# Patient Record
Sex: MALE | Race: WHITE | Employment: OTHER | URBAN - METROPOLITAN AREA
[De-identification: names, ages, dates, MRNs, and addresses within clinical notes are randomized per-mention and may not be internally consistent; named-entity substitution may affect disease eponyms.]

---

## 2017-01-03 ENCOUNTER — HOSPITAL ENCOUNTER (OUTPATIENT)
Dept: PHYSICAL THERAPY | Age: 62
Discharge: HOME | End: 2017-01-03
Payer: COMMERCIAL

## 2017-01-03 PROCEDURE — 97110 THERAPEUTIC EXERCISES: CPT

## 2017-01-03 PROCEDURE — 97140 MANUAL THERAPY 1/> REGIONS: CPT

## 2017-01-03 NOTE — PROGRESS NOTES
PT DAILY TREATMENT NOTE     Patient Name: Mike Winter  Date:1/3/2017  : 1955  [x]  Patient  Verified  Payor: BLUE CROSS / Plan: Kiki Aguilar / Product Type: PPO /    In time: 5:30  Out time:6:30  Total Treatment Time (min): 60  Visit #: 6 of 12    Treatment Area: Other cervical disc degeneration, unspecified cervical region [M50.30]  Other specific arthropathies, not elsewhere classified, other specified site [M12.88]  Other muscle spasm [M62.838]  Other headache syndrome [G44.89]    SUBJECTIVE  Pain Level (0-10 scale): 4/10 neck; 4/10 HA  Any medication changes, allergies to medications, adverse drug reactions, diagnosis change, or new procedure performed?: [x] No    [] Yes (see summary sheet for update)  Subjective functional status/changes:   [] No changes reported  Pt reports still fluctuating headaches but definitely when he wakes up in the morning that can go around the top of his head to his eye. Pt states he is working on his posture more while driving but hasn't done the pillow behind his back yet. Pt feels he is improving since starting therapy as he doesn't have to take as much medication any more. Pt has a follow up with MD at the end of this month to go over MRI results but he hasn't heard from the MD yet.      OBJECTIVE    Modality rationale: decrease pain and increase tissue extensibility to improve the patients ability to improve ease of sleeping and performing ADLs   Min Type Additional Details    [] Estim:  []Unatt       []IFC  []Premod                        []Other:  []w/ice   []w/heat  Position:  Location:    [] Estim: []Att    []TENS instruct  []NMES                    []Other:  []w/US   []w/ice   []w/heat  Position:  Location:    []  Traction: [] Cervical       []Lumbar                       [] Prone          []Supine                       []Intermittent   []Continuous Lbs:  [] before manual  [] after manual    []  Ultrasound: []Continuous   [] Pulsed []1MHz   []3MHz W/cm2:  Location:    []  Iontophoresis with dexamethasone         Location: [] Take home patch   [] In clinic   10 []  Ice     [x]  heat  []  Ice massage  []  Laser   []  Anodyne Position: setaed  Location: c/s    []  Laser with stim  []  Other:  Position:  Location:    []  Vasopneumatic Device Pressure:       [] lo [] med [] hi   Temperature: [] lo [] med [] hi   [x] Skin assessment post-treatment:  [x]intact []redness- no adverse reaction    []redness - adverse reaction:     35 min Therapeutic Exercise:  [x] See flow sheet :   Rationale: increase ROM, increase strength and improve coordination to improve the patients ability to improve posture and decrease headache symptoms    15 min Manual Therapy:  SOR   Rationale: decrease pain, increase ROM and increase tissue extensibility to improve ease of driving and reducing headaches          With   [] TE   [] TA   [] neuro   [] other: Patient Education: [x] Review HEP    [] Progressed/Changed HEP based on:   [] positioning   [] body mechanics   [] transfers   [] heat/ice application    [] other:      Other Objective/Functional Measures: FOTO: 57  Decreased headache with supine lying and SOR  No increased pain with exercises  Continues to have forward head posture and requires cueing to correct     Pain Level (0-10 scale) post treatment: 1-2/10    ASSESSMENT/Changes in Function: Pt making slow and steady progress towards goals. While pt isn't requiring as much medication he continues to have headaches daily that range from moderate to severe along suboccipital distribution. Pt has forward head posture and is making some modifications at home but not all. Will continue working on improved posture awareness to decrease headaches and improve c/s mobility and stability for ease of driving and performing ADLs.      Patient will continue to benefit from skilled PT services to modify and progress therapeutic interventions, address functional mobility deficits, address ROM deficits, address strength deficits and assess and modify postural abnormalities to attain remaining goals. Progress towards goals / Updated goals:  Short Term Goals: To be accomplished in 1 weeks:  1. Pt will be independent with HEP with appropriate technique and compliance of 1-2x per day to facilitate continued level of care. met   Long Term Goals: To be accomplished in 6 weeks:  1. Pt will improve FOTO score by 6 points to demonstrate improved functional abilities. Progressed 1 point (1/3/17)  2. Pt will report <4/10 pain during daily activities to improve QOL. Not met continues to fluctuate in intensity (1/3/17)  3. Pt will report 2 headaches per week for 2 weeks to demonstrate improved symptoms. Not met still daily (1/3/17)  4. Pt will improve cervical ROM to 75% in each direction to increase ease with driving. 5. Pt will demonstrate proper posturing during standing and sitting activities in thoracic and cervical spine to increase ease with work and [employer] activities.  Continues to require cues to prevent forward head posture (1/3/17)    PLAN  [x]  Upgrade activities as tolerated     [x]  Continue plan of care  []  Update interventions per flow sheet       []  Discharge due to:_  []  Other:_      Hyacinth Mcburney, PTA 1/3/2017  10:01 AM    Future Appointments  Date Time Provider Dex [place]   1/3/2017 5:30 PM Hyacinth Mcburney, PTA [facility]   1/5/2017 5:30 PM Hyacinth Mcburney, PTA [facility]   1/24/2017 3:15 PM Rajinder Cole  [address]   3/23/2017 4:15 PM Lyly Hall  [address] [place]

## 2017-01-05 ENCOUNTER — HOSPITAL ENCOUNTER (OUTPATIENT)
Dept: PHYSICAL THERAPY | Age: 62
Discharge: HOME | End: 2017-01-05
Payer: COMMERCIAL

## 2017-01-05 PROCEDURE — 97140 MANUAL THERAPY 1/> REGIONS: CPT

## 2017-01-05 PROCEDURE — 97110 THERAPEUTIC EXERCISES: CPT

## 2017-01-05 NOTE — PROGRESS NOTES
PT DAILY TREATMENT NOTE     Patient Name: Kerry Galicia  Date:2017  : 1955  [x]  Patient  Verified  Payor: BLUE CROSS / Plan: [address] [place] [address] [place] / Product Type: PPO /    In time:532  Out time:615  Total Treatment Time (min): 43  Visit #: 7 of 12    Treatment Area: Other cervical disc degeneration, unspecified cervical region [M50.30]  Other specific arthropathies, not elsewhere classified, other specified site [M12.88]  Other muscle spasm [M62.838]  Other headache syndrome [G44.89]    SUBJECTIVE  Pain Level (0-10 scale): 3/10 neck and HA symptoms  Any medication changes, allergies to medications, adverse drug reactions, diagnosis change, or new procedure performed?: [x] No    [] Yes (see summary sheet for update)  Subjective functional status/changes:   [] No changes reported  Pt reports having L shoulder and arm pain after last session which he think started from the  ER exercise. Pt also went to store, mother's house, and other house activities that day, but the pain subsided once he went to bed. Pt did not have any pain yesterday.      OBJECTIVE    Modality rationale: decrease pain and increase tissue extensibility to improve the patients ability to increase ease with ADLs and household tasks    Min Type Additional Details    [] Estim:  []Unatt       []IFC  []Premod                        []Other:  []w/ice   []w/heat  Position:  Location:    [] Estim: []Att    []TENS instruct  []NMES                    []Other:  []w/US   []w/ice   []w/heat  Position:  Location:    []  Traction: [] Cervical       []Lumbar                       [] Prone          []Supine                       []Intermittent   []Continuous Lbs:  [] before manual  [] after manual    []  Ultrasound: []Continuous   [] Pulsed                           []1MHz   []3MHz W/cm2:  Location:    []  Iontophoresis with dexamethasone         Location: [] Take home patch   [] In clinic   10 []  Ice     [x]  heat  []  Ice massage  [] Laser   []  Anodyne Position: Seated  Location: C/S    []  Laser with stim  []  Other:  Position:  Location:    []  Vasopneumatic Device Pressure:       [] lo [] med [] hi   Temperature: [] lo [] med [] hi   [] Skin assessment post-treatment:  []intact []redness- no adverse reaction    []redness - adverse reaction:     24 min Therapeutic Exercise:  [x] See flow sheet :   Rationale: increase ROM, increase strength and improve coordination to improve the patients ability to perform functional activities in the home and community setting     9 min Manual Therapy:  Suboccipital release, UT trigger point release, L cervical facet side glides grade 3   Rationale: decrease pain, increase ROM, increase tissue extensibility and decrease trigger points to increase ease with daily tasks and reduce HA symptoms           With   [x] TE   [] TA   [] neuro   [] other: Patient Education: [x] Review HEP    [] Progressed/Changed HEP based on:   [] positioning   [] body mechanics   [] transfers   [] heat/ice application    [] other:      Other Objective/Functional Measures:   Tactile cueing during chin retraction as more cervical extension was occurring than retraction    Slight increase in tightness along L cervical region following manual therapy but HA symptoms reduced  Stretches incorporated into HEP not in clinic any more     Pain Level (0-10 scale) post treatment: 1/10 (no HA, just slight slight pain in neck)    ASSESSMENT/Changes in Function: Pt tolerated treatment session fairly today with good form during all stretches. Will incorporate stretches (UT, LS, and pec stretching) into HEP to work more on mobility and strengthening. Pt continued to require tactile cueing for cervical retraction as more extension was present. Pt is able to sustain correct position following correction. Manual therapy and heat is able to reduce HA symptoms and reduce cervical pain but continues to have increase intermittently between sessions. Encouraged pt to get tennis balls to help with self relief of HA. Patient will continue to benefit from skilled PT services to modify and progress therapeutic interventions, address functional mobility deficits, address ROM deficits, address strength deficits, analyze and address soft tissue restrictions, analyze and cue movement patterns, analyze and modify body mechanics/ergonomics, assess and modify postural abnormalities and instruct in home and community integration to attain remaining goals. [x]  See Plan of Care  []  See progress note/recertification  []  See Discharge Summary         Progress towards goals / Updated goals:  Short Term Goals: To be accomplished in 1 weeks:  1. Pt will be independent with HEP with appropriate technique and compliance of 1-2x per day to facilitate continued level of care. met   Long Term Goals: To be accomplished in 6 weeks:  1. Pt will improve FOTO score by 6 points to demonstrate improved functional abilities. Progressed 1 point (1/3/17)  2. Pt will report <4/10 pain during daily activities to improve QOL. Not met continues to fluctuate in intensity (1/3/17)  3. Pt will report 2 headaches per week for 2 weeks to demonstrate improved symptoms. Not met still daily (1/3/17)  4. Pt will improve cervical ROM to 75% in each direction to increase ease with driving. 5. Pt will demonstrate proper posturing during standing and sitting activities in thoracic and cervical spine to increase ease with work and [employer] activities.  Continues to require cues to prevent forward head posture (1/3/17)    PLAN  []  Upgrade activities as tolerated     [x]  Continue plan of care  []  Update interventions per flow sheet       []  Discharge due to:_  []  Other:_      Desmond Paul 1/5/2017  12:52 PM    Future Appointments  Date Time Provider Dex [place]   1/5/2017 5:30 PM Desmond Paul [facility]   1/24/2017 3:15 PM Mikki Liang  [address]   3/23/2017 4:15 PM Jose Armando Darden  [address] [place]

## 2017-01-12 ENCOUNTER — HOSPITAL ENCOUNTER (OUTPATIENT)
Dept: PHYSICAL THERAPY | Age: 62
Discharge: HOME | End: 2017-01-12
Payer: COMMERCIAL

## 2017-01-12 ENCOUNTER — TELEPHONE (OUTPATIENT)
Dept: ORTHOPEDIC SURGERY | Age: 62
End: 2017-01-12

## 2017-01-12 DIAGNOSIS — M50.30 DDD (DEGENERATIVE DISC DISEASE), CERVICAL: Primary | ICD-10-CM

## 2017-01-12 PROCEDURE — 97110 THERAPEUTIC EXERCISES: CPT

## 2017-01-12 PROCEDURE — 97140 MANUAL THERAPY 1/> REGIONS: CPT

## 2017-01-12 RX ORDER — LISINOPRIL AND HYDROCHLOROTHIAZIDE 12.5; 2 MG/1; MG/1
TABLET ORAL
Qty: 30 TAB | Refills: 0 | Status: SHIPPED | OUTPATIENT
Start: 2017-01-12 | End: 2017-02-13 | Stop reason: SDUPTHER

## 2017-01-12 NOTE — PROGRESS NOTES
PT DAILY TREATMENT NOTE     Patient Name: Yonatan Lorenzo  Date:2017  : 1955  [x]  Patient  Verified  Payor: BLUE CROSS / Plan: [email or web address] [facility] / Product Type: PPO /    In time:530  Out time:623  Total Treatment Time (min): 53  Visit #: 8 of 12    Treatment Area: Other cervical disc degeneration, unspecified cervical region [M50.30]  Other specific arthropathies, not elsewhere classified, other specified site [M12.88]  Other muscle spasm [M62.838]  Other headache syndrome [G44.89]    SUBJECTIVE  Pain Level (0-10 scale): 3/10 neck, 2/10 headache  Any medication changes, allergies to medications, adverse drug reactions, diagnosis change, or new procedure performed?: [x] No    [] Yes (see summary sheet for update)  Subjective functional status/changes:   [] No changes reported  Pt reports having less pain today. Pt had worse headache earlier but did chin tucks and pain reduced. Pt sometimes does chin tucks wrong and makes his headache worse.      OBJECTIVE    Modality rationale: decrease pain and increase tissue extensibility to improve the patients ability to increase ease with ADLs and household tasks    Min Type Additional Details    [] Estim:  []Unatt       []IFC  []Premod                        []Other:  []w/ice   []w/heat  Position:  Location:    [] Estim: []Att    []TENS instruct  []NMES                    []Other:  []w/US   []w/ice   []w/heat  Position:  Location:    []  Traction: [] Cervical       []Lumbar                       [] Prone          []Supine                       []Intermittent   []Continuous Lbs:  [] before manual  [] after manual    []  Ultrasound: []Continuous   [] Pulsed                           []1MHz   []3MHz W/cm2:  Location:    []  Iontophoresis with dexamethasone         Location: [] Take home patch   [] In clinic   10 []  Ice     [x]  heat  []  Ice massage  []  Laser   []  Anodyne Position: Seated  Location: C/S    []  Laser with stim  []  Other: Position:  Location:    []  Vasopneumatic Device Pressure:       [] lo [] med [] hi   Temperature: [] lo [] med [] hi   [x] Skin assessment post-treatment:  [x]intact []redness- no adverse reaction    []redness - adverse reaction:     35 min Therapeutic Exercise:  [x] See flow sheet :   Rationale: increase ROM, increase strength and improve coordination to improve the patients ability to perform functional activities with improve posture    8 min Manual Therapy:  Subocciptial release L rotation PNF contract relax to same side   Rationale: decrease pain, increase ROM and increase tissue extensibility to increase ease with ADLs and reduced headache symptoms           With   [x] TE   [] TA   [] neuro   [] other: Patient Education: [x] Review HEP    [] Progressed/Changed HEP based on:   [] positioning   [] body mechanics   [] transfers   [] heat/ice application    [] other:      Other Objective/Functional Measures:   Verbal and tactile cues for chin tucks and goes into cervical extension and attempts to retract  Able to perform following cueing for chin tucks   Increased to BTB during rows and extension  Improvements in PROM to L rotation but did not carry over to seated position  No headache following manual therapy      Pain Level (0-10 scale) post treatment: 0/10    ASSESSMENT/Changes in Function: See progress note    Patient will continue to benefit from skilled PT services to modify and progress therapeutic interventions, address functional mobility deficits, address ROM deficits, address strength deficits, analyze and address soft tissue restrictions, analyze and cue movement patterns, analyze and modify body mechanics/ergonomics, assess and modify postural abnormalities and instruct in home and community integration to attain remaining goals.      []  See Plan of Care  [x]  See progress note/recertification  []  See Discharge Summary         Progress towards goals / Updated goals:  See progress note     PLAN  [] Upgrade activities as tolerated     [x]  Continue plan of care  []  Update interventions per flow sheet       []  Discharge due to:_  []  Other:_      Chintan Mccormick 1/12/2017  2:38 PM    Future Appointments  Date Time Provider Dex Hat[place]   1/12/2017 5:30 PM Chintan Mccormick [facility]   1/17/2017 5:30 PM Chintan Mccormick [facility]   1/19/2017 5:30 PM Raeann Membreno, PT [facility]   1/24/2017 3:15 PM Prisca Sherwood  [address]   1/26/2017 5:30 PM Brandi Brian PTA [facility]   3/23/2017 4:15 PM Joyce Lim  [address] [place]

## 2017-01-12 NOTE — PROGRESS NOTES
[facility] - [address]  [phone] fax    Physical Therapy Progress Note  Patient name: Brittni Sullivan Start of Care: 16   Referral source: Merrill Bowens MD : 1955   Medical/Treatment Diagnosis: Other cervical disc degeneration, unspecified cervical region [M50.30]  Other specific arthropathies, not elsewhere classified, other specified site [M12.88]  Other muscle spasm [M62.838]  Other headache syndrome [G44.89] Onset Date:2 years ago     Prior Hospitalization: see medical history Provider#: [id number]   Medications: Verified on Patient Summary List    Comorbidities: Arthritis, HTN  Prior Level of Function:Ind with ADLs and work related tasks with less symptoms, 2-5 headaches per week, Going to [employer]  Visits from [facility] Care: 8    Missed Visits: 0    Established Goals:         Excellent           Good         Limited           None  [x] Increased ROM   []  []  [x]  []  [x] Increased Strength  []  []  [x]  []  [x] Increased Mobility  []  []  [x]  []   [x] Decreased Pain   []  []  [x]  []  [x] Decreased Heachaches  []  []  [x]  []    Key Functional Changes:   Short Term Goals: To be accomplished in 1 weeks:  1. Pt will be independent with HEP with appropriate technique and compliance of 1-2x per day to facilitate continued level of care. Met   Long Term Goals: To be accomplished in 6 weeks:  1. Pt will improve FOTO score by 6 points to demonstrate improved functional abilities. Progressed 1 point (1/3/17)  2. Pt will report <4/10 pain during daily activities to improve QOL. Not met continues to fluctuate in intensity from 2-7/10 pain depending on the day  (17)  3. Pt will report 2 headaches per week for 2 weeks to demonstrate improved symptoms. Not met still daily (17)  4. Pt will improve cervical ROM to 75% in each direction to increase ease with driving.  Progressing 17 46 degrees to L rotation (painful at [facility]), 55 degrees R rotation  5. Pt will demonstrate proper posturing during standing and sitting activities in thoracic and cervical spine to increase ease with work and [employer] activities. Continues to require cues to prevent forward head posture and rounded shoulders when standing and sitting (1/12/17)    Updated Goals: to be achieved in 4 weeks:  1. Pt will improve FOTO score by 6 points to demonstrate improved functional abilities. 2. Pt will report <4/10 pain during daily activities to improve QOL. 3. Pt will report 2 headaches per week for 2 weeks to demonstrate improved symptoms. 4. Pt will improve cervical ROM to 75% in each direction to increase ease with driving. 5. Pt will demonstrate proper posturing during standing and sitting activities in thoracic and cervical spine to increase ease with work and [employer] activities. ASSESSMENT/RECOMMENDATIONS:  Pt is making slow and steady improvements with physical therapy since evaluation. Pt continues to have headaches daily varying in intensity and severity. Pt has been educated on proper position every visit and has reported altering his truck seat to help with his pain. However, pt consistently catches himself with forward head posturing and rounded shoulders. Pt benefits from manual therapy to suboccipital muscles and mobilizations to cervical facets, but the duration of reduced symptoms is minimal. Pt has been educated on incorporating stretches into HEP and getting tennis balls to independently reduce headache symptoms but has yet to acquire. Pt will continue to benefit from skilled physical therapy services to address remaining deficits and independent management of headache symptoms to return pt to optimal level of function.      [x]Continue therapy per initial plan/protocol at a frequency of  2 x per week for 4 weeks  []Continue therapy with the following recommended changes:_____________________ _____________________________________________________________________  []Discontinue therapy progressing towards or have reached established goals  []Discontinue therapy due to lack of appreciable progress towards goals  []Discontinue therapy due to lack of attendance or compliance  []Await Physician's recommendations/decisions regarding therapy  []Other:________________________________________________________________    Thank you for this referral.   Kevin Benson 1/12/2017 2:40 PM    NOTE TO PHYSICIAN:  PLEASE COMPLETE THE ORDERS BELOW AND   FAX TO [facility]: [phone]  If you are unable to process this request in 24 hours please contact our office: [phone]    ? I have read the above report and request that my patient continue as recommended. ? I have read the above report and request that my patient continue therapy with the following changes/special instructions:____________________________________  ? I have read the above report and request that my patient be discharged from therapy.     Physicians signature: ______________________________Date: ______Time:______

## 2017-01-12 NOTE — TELEPHONE ENCOUNTER
Ok to place order per Dr. Brad Moreland, order faxed to [address] [facility]. Attempted to contact number listed below, no answer unable to leave message. No further action required at this time.

## 2017-01-12 NOTE — TELEPHONE ENCOUNTER
Patient has appt today at [facility]. Need a new order for addl therapy faxed to therapist.  Please fax asap. Patient wife can be reached at [phone].

## 2017-01-17 ENCOUNTER — HOSPITAL ENCOUNTER (OUTPATIENT)
Dept: PHYSICAL THERAPY | Age: 62
Discharge: HOME | End: 2017-01-17
Payer: COMMERCIAL

## 2017-01-17 PROCEDURE — 97110 THERAPEUTIC EXERCISES: CPT

## 2017-01-17 PROCEDURE — 97140 MANUAL THERAPY 1/> REGIONS: CPT

## 2017-01-17 NOTE — PROGRESS NOTES
PT DAILY TREATMENT NOTE     Patient Name: Buck August  Date:2017  : 1955  [x]  Patient  Verified  Payor: BLUE CROSS / Plan: [facility] / Product Type: PPO /    In time:529  Out time:614  Total Treatment Time (min): 45  Visit #: 1 of 8    Treatment Area: Other cervical disc degeneration, unspecified cervical region [M50.30]  Other specific arthropathies, not elsewhere classified, other specified site [M12.88]  Other muscle spasm [M62.838]  Other headache syndrome [G44.89]    SUBJECTIVE  Pain Level (0-10 scale): 3/10 neck and headache  Any medication changes, allergies to medications, adverse drug reactions, diagnosis change, or new procedure performed?: [x] No    [] Yes (see summary sheet for update)  Subjective functional status/changes:   [] No changes reported  Pt reports today's headache is better and , but yesterday's headache was bad. Pt still gets headaches daily. Pt continues to report neck pain precedes headache.      OBJECTIVE    Modality rationale: decrease pain and increase tissue extensibility to improve the patients ability to increase ease with headache symptoms and ADLs   Min Type Additional Details    [] Estim:  []Unatt       []IFC  []Premod                        []Other:  []w/ice   []w/heat  Position:  Location:    [] Estim: []Att    []TENS instruct  []NMES                    []Other:  []w/US   []w/ice   []w/heat  Position:  Location:    []  Traction: [] Cervical       []Lumbar                       [] Prone          []Supine                       []Intermittent   []Continuous Lbs:  [] before manual  [] after manual    []  Ultrasound: []Continuous   [] Pulsed                           []1MHz   []3MHz W/cm2:  Location:    []  Iontophoresis with dexamethasone         Location: [] Take home patch   [] In clinic   10 []  Ice     [x]  heat  []  Ice massage  []  Laser   []  Anodyne Position: Seated  Location: C/S    []  Laser with stim  []  Other: Position:  Location:    []  Vasopneumatic Device Pressure:       [] lo [] med [] hi   Temperature: [] lo [] med [] hi   [x] Skin assessment post-treatment:  [x]intact []redness- no adverse reaction    []redness - adverse reaction:     25 min Therapeutic Exercise:  [x] See flow sheet :   Rationale: increase ROM, increase strength and improve coordination to improve the patients ability to perform functional activities with decreased symptoms and improve posture    10 min Manual Therapy:  PA central cervical and thoracic, Lateral PA to cervical facets, Trigger point release to B UT, Subocciptial release    Rationale: decrease pain, increase ROM, increase tissue extensibility and decrease trigger points to increase ease with ADLs and driving          With   [x] TE   [] TA   [] neuro   [] other: Patient Education: [x] Review HEP    [] Progressed/Changed HEP based on:   [] positioning   [] body mechanics   [] transfers   [] heat/ice application    [] other:      Other Objective/Functional Measures:   Pain with thoracic extension over 1/2 foam roll supine - increased pain, Resumed seated position   Pain with thoracic PA around T3 but tolerable  Improvements in L rotation following lateral facet PA in prone  Received blue TB for exercises with HEP  No headache symptoms following manual  Pt demonstrated tennis ball release for home to reduce headache symptoms  Verbal cueing for straight arm pull down to reduce UT activation      Pain Level (0-10 scale) post treatment: 2/10 (behind ears on B sides of cervical spine)    ASSESSMENT/Changes in Function: Pt tolerated treatment session fairly well today with reduction of headache symptoms with subocciptal release. Pt demonstrated good form with tennis ball release for HEP. Pt is compliant with HEP and stretches. Pt had increased pain with thoracic PA mobilizations and is hypomobile throughout (and painful).  Due to hypomobility, pt's cervical spine is in increased lordosis and putting extra stress on subocciptial region. Pt is improving with UT awareness during exercises to reduce pain levels. Continue POC. Patient will continue to benefit from skilled PT services to modify and progress therapeutic interventions, address functional mobility deficits, address ROM deficits, address strength deficits, analyze and address soft tissue restrictions, analyze and cue movement patterns, assess and modify postural abnormalities and instruct in home and community integration to attain remaining goals. []  See Plan of Care  [x]  See progress note/recertification  []  See Discharge Summary         Progress towards goals / Updated goals:  1. Pt will improve FOTO score by 6 points to demonstrate improved functional abilities. 2. Pt will report <4/10 pain during daily activities to improve QOL. 3. Pt will report 2 headaches per week for 2 weeks to demonstrate improved symptoms. 4. Pt will improve cervical ROM to 75% in each direction to increase ease with driving. 5. Pt will demonstrate proper posturing during standing and sitting activities in thoracic and cervical spine to increase ease with work and [employer] activities.      PLAN  []  Upgrade activities as tolerated     [x]  Continue plan of care  []  Update interventions per flow sheet       []  Discharge due to:_  []  Other:_      Lizy Vizcaino 1/17/2017  10:01 AM    Future Appointments  Date Time Provider Dex Alysia   1/17/2017 5:30 PM Lizy Vizcaino [facility]   1/19/2017 5:30 PM Rosalva Villalobos PTA [facility]   1/24/2017 3:15 PM Valentina Dickerson  [address]   1/26/2017 5:30 PM Rosalva Villalobos PTA [facility]   3/23/2017 4:15 PM Mariella Mcdonough  [address] [place]

## 2017-01-18 ENCOUNTER — LAB ONLY (OUTPATIENT)
Dept: INTERNAL MEDICINE CLINIC | Age: 62
End: 2017-01-18

## 2017-01-18 ENCOUNTER — HOSPITAL ENCOUNTER (OUTPATIENT)
Dept: LAB | Age: 62
Discharge: HOME | End: 2017-01-18
Payer: COMMERCIAL

## 2017-01-18 DIAGNOSIS — R30.0 DYSURIA: ICD-10-CM

## 2017-01-18 DIAGNOSIS — Z12.5 SCREENING FOR PROSTATE CANCER: ICD-10-CM

## 2017-01-18 DIAGNOSIS — Z86.718 PERSONAL HISTORY OF VENOUS THROMBOSIS AND EMBOLISM: Primary | ICD-10-CM

## 2017-01-18 LAB
APPEARANCE UR: CLEAR
BACTERIA URNS QL MICRO: NEGATIVE /HPF
BILIRUB UR QL: NEGATIVE
COLOR UR: YELLOW
EPITH CASTS URNS QL MICRO: NEGATIVE /LPF (ref 0–5)
GLUCOSE UR STRIP.AUTO-MCNC: NEGATIVE MG/DL
HGB UR QL STRIP: NEGATIVE
INR BLD: 2
KETONES UR QL STRIP.AUTO: NEGATIVE MG/DL
LEUKOCYTE ESTERASE UR QL STRIP.AUTO: NEGATIVE
NITRITE UR QL STRIP.AUTO: NEGATIVE
PH UR STRIP: 7.5 [PH] (ref 5–8)
PROT UR STRIP-MCNC: NEGATIVE MG/DL
PSA SERPL-MCNC: 3 NG/ML (ref 0–4)
PT POC: NORMAL SEC
RBC #/AREA URNS HPF: NEGATIVE /HPF (ref 0–5)
SP GR UR REFRACTOMETRY: 1.01 (ref 1–1.03)
UROBILINOGEN UR QL STRIP.AUTO: 0.2 EU/DL (ref 0.2–1)
VALID INTERNAL CONTROL?: YES
WBC URNS QL MICRO: NEGATIVE /HPF (ref 0–4)

## 2017-01-18 PROCEDURE — 84153 ASSAY OF PSA TOTAL: CPT | Performed by: INTERNAL MEDICINE

## 2017-01-18 PROCEDURE — 81001 URINALYSIS AUTO W/SCOPE: CPT | Performed by: INTERNAL MEDICINE

## 2017-01-19 ENCOUNTER — HOSPITAL ENCOUNTER (OUTPATIENT)
Dept: PHYSICAL THERAPY | Age: 62
Discharge: HOME | End: 2017-01-19
Payer: COMMERCIAL

## 2017-01-19 PROCEDURE — 97110 THERAPEUTIC EXERCISES: CPT

## 2017-01-19 NOTE — PROGRESS NOTES
Request for use of Dry Needling/Intramuscular Manual Therapy  Patient: Bret Borrero     Referral Source: Faviola Shearer MD  Diagnosis: Other cervical disc degeneration, unspecified cervical region [M50.30]  Other specific arthropathies, not elsewhere classified, other specified site [M12.88]  Other muscle spasm [M62.838]  Other headache syndrome [G44.89]      : 1955  Date of initial visit: 17   Attended visits: 9  Missed Visits: 0    Based on findings from the physical therapy examination and evaluation, the evaluating therapist believes the patient, Bret Borrero  would benefit from including Dry Needling as part of the plan of care. Dry needling is a treatment technique utilized in conjunction with other PT interventions to inactivate myofascial trigger points and the pain and dysfunction they cause. Dry Needling is an advanced procedure that requires additional training including greater than 54 hours of intensive course work. Physical Therapists at [address] [place] [address] are trained and/or certified through [employer] for their education.     PROCEDURE:   Solid filament sterile needle (typically 0.3mm/30 gauge) inserted into a trigger point   Repeated movements inactivate the trigger points, taking 30-60 seconds per site   Typically consists of 1 dry needling session per week and a possible second treatment including muscle re-education, flexibility, strengthening and other manual techniques to facilitate the benefits of dry needling     BENEFITS:   Inactivation of trigger points   Decreased pain   Increased muscle length   Improved movement patterns   Restoration of function POTENTIAL RISKS:   Post-needling soreness   Infection   Bruising/bleeding   Penetration of a nerve   Pneumothorax   All treating PTs have been thoroughly educated in avoiding adverse reactions    If you agree with this recommendation, please sign this form and fax it to us at [phone]. If you have questions or concerns regarding dry needling or any other treatment we may be providing, please contact us at [phone]    Thank you for allowing us to assist in the care of your patient. Renella Burkitt, PT    1/19/2017 6:09 PM     NOTE TO PHYSICIAN:  PLEASE COMPLETE THE ORDERS BELOW AND   FAX TO [facility]: [phone]  If you are unable to process this request in 24 hours please contact our office:   [phone]    I have read the above request and AGREE to the recommendation of including dry needling as part of the plan of care.     Physicians signature: _________________________Date: _________Time:________

## 2017-01-19 NOTE — PROGRESS NOTES
PT DAILY TREATMENT NOTE     Patient Name: Crow Mcclellan  Date:2017  : 1955  [x]  Patient  Verified  Payor: BLUE CROSS / Plan: [facility] [place] / Product Type: PPO /    In time:530  Out time:617  Total Treatment Time (min): 47  Visit #: 2 of 8    Treatment Area: Other cervical disc degeneration, unspecified cervical region [M50.30]  Other specific arthropathies, not elsewhere classified, other specified site [M12.88]  Other muscle spasm [M62.838]  Other headache syndrome [G44.89]    SUBJECTIVE  Pain Level (0-10 scale): 3/10 headache, 2/10 C/s  Any medication changes, allergies to medications, adverse drug reactions, diagnosis change, or new procedure performed?: [x] No    [] Yes (see summary sheet for update)  Subjective functional status/changes:   [] No changes reported  Pt reports trying to do tennis ball to suboccipital muscle which alleviated his headache symptoms. Pt was able to go about 1 hour before headache symptoms came back after last session. Pt also performs TB exercises without difficulty at home.      OBJECTIVE    Modality rationale: decrease pain and increase tissue extensibility to improve the patients ability to increase ease with turning head and headache symptoms    Min Type Additional Details    [] Estim:  []Unatt       []IFC  []Premod                        []Other:  []w/ice   []w/heat  Position:  Location:    [] Estim: []Att    []TENS instruct  []NMES                    []Other:  []w/US   []w/ice   []w/heat  Position:  Location:    []  Traction: [] Cervical       []Lumbar                       [] Prone          []Supine                       []Intermittent   []Continuous Lbs:  [] before manual  [] after manual    []  Ultrasound: []Continuous   [] Pulsed                           []1MHz   []3MHz W/cm2:  Location:    []  Iontophoresis with dexamethasone         Location: [] Take home patch   [] In clinic   10 []  Ice     [x]  heat  []  Ice massage  []  Laser   [] Anodyne Position: Seated  Location: C/S    []  Laser with stim  []  Other:  Position:  Location:    []  Vasopneumatic Device Pressure:       [] lo [] med [] hi   Temperature: [] lo [] med [] hi   [x] Skin assessment post-treatment:  [x]intact []redness- no adverse reaction    []redness - adverse reaction:     35 min Therapeutic Exercise:  [x] See flow sheet :   Rationale: increase ROM, increase strength and improve coordination to improve the patients ability to perform functional activities in the home and community setting     2 min Manual Therapy:  Suboccipital release    Rationale: decrease pain, increase ROM and increase tissue extensibility to reduce headache symptoms and improve cervical mobility           With   [x] TE   [] TA   [] neuro   [] other: Patient Education: [x] Review HEP    [] Progressed/Changed HEP based on:   [] positioning   [] body mechanics   [] transfers   [] heat/ice application    [] other:      Other Objective/Functional Measures:   Less pain during L rotation following SNAGs, Demonstration of exercise with good follow through independently   Slight difficulty with ER with TB  Verbal cues for chin tuck during wall ABCs, Initial posture very good but fatigues with UE movement     Pain Level (0-10 scale) post treatment: 0/10    ASSESSMENT/Changes in Function: Pt tolerated treatment session fairly well today with reports of increase ease with TB exercises. Pt is progressing with strength but continues to demonstrate forward head posture and thoracic kyphosis. Pt has become independent with management of headache symptoms but continues to get headache daily with less severity. Pt is progressing with therapy and will begin to reduce manual treatment.      Patient will continue to benefit from skilled PT services to modify and progress therapeutic interventions, address functional mobility deficits, address ROM deficits, address strength deficits, analyze and address soft tissue restrictions, analyze and cue movement patterns, analyze and modify body mechanics/ergonomics, assess and modify postural abnormalities and instruct in home and community integration to attain remaining goals. []  See Plan of Care  []  See progress note/recertification  []  See Discharge Summary         Progress towards goals / Updated goals:  1. Pt will improve FOTO score by 6 points to demonstrate improved functional abilities. 2. Pt will report <4/10 pain during daily activities to improve QOL. 3. Pt will report 2 headaches per week for 2 weeks to demonstrate improved symptoms. Progressing 1/19/17 Reports daily headaches with reduce intensity and severity   4. Pt will improve cervical ROM to 75% in each direction to increase ease with driving. 5. Pt will demonstrate proper posturing during standing and sitting activities in thoracic and cervical spine to increase ease with work and [employer] activities.      PLAN  []  Upgrade activities as tolerated     []  Continue plan of care  []  Update interventions per flow sheet       []  Discharge due to:_  []  Other:_      [address] 1/19/2017  5:39 PM    Future Appointments  Date Time Provider Dex Alysia   1/24/2017 3:15 PM Melodie Fontanez  [address]   1/26/2017 5:30 PM Mary Murguia PTA [facility]   3/23/2017 4:15 PM Annalisa Colin  [address] [place]

## 2017-01-23 RX ORDER — WARFARIN SODIUM 5 MG/1
TABLET ORAL
Qty: 75 TAB | Refills: 0 | Status: SHIPPED | OUTPATIENT
Start: 2017-01-23 | End: 2017-03-06 | Stop reason: SDUPTHER

## 2017-01-24 ENCOUNTER — OFFICE VISIT (OUTPATIENT)
Dept: ORTHOPEDIC SURGERY | Age: 62
End: 2017-01-24

## 2017-01-24 VITALS
HEART RATE: 82 BPM | RESPIRATION RATE: 18 BRPM | BODY MASS INDEX: 31.5 KG/M2 | OXYGEN SATURATION: 96 % | HEIGHT: 70 IN | DIASTOLIC BLOOD PRESSURE: 65 MMHG | TEMPERATURE: 98.2 F | WEIGHT: 220 LBS | SYSTOLIC BLOOD PRESSURE: 133 MMHG

## 2017-01-24 DIAGNOSIS — M48.02 CERVICAL SPINAL STENOSIS: Primary | ICD-10-CM

## 2017-01-24 DIAGNOSIS — M51.36 DDD (DEGENERATIVE DISC DISEASE), LUMBAR: ICD-10-CM

## 2017-01-24 DIAGNOSIS — G89.29 OTHER CHRONIC PAIN: ICD-10-CM

## 2017-01-24 DIAGNOSIS — G44.229 CHRONIC TENSION-TYPE HEADACHE, NOT INTRACTABLE: ICD-10-CM

## 2017-01-24 DIAGNOSIS — M47.812 CERVICAL FACET JOINT SYNDROME: ICD-10-CM

## 2017-01-24 DIAGNOSIS — M62.838 MUSCLE SPASM: ICD-10-CM

## 2017-01-24 DIAGNOSIS — Z79.01 WARFARIN ANTICOAGULATION: ICD-10-CM

## 2017-01-24 RX ORDER — HYDROCODONE BITARTRATE AND ACETAMINOPHEN 7.5; 325 MG/1; MG/1
TABLET ORAL
Qty: 60 TAB | Refills: 0 | Status: SHIPPED | OUTPATIENT
Start: 2017-01-24 | End: 2017-04-13 | Stop reason: ALTCHOICE

## 2017-01-24 NOTE — PATIENT INSTRUCTIONS
ImageSpike Activation    Thank you for requesting access to ImageSpike. Please follow the instructions below to securely access and download your online medical record. ImageSpike allows you to send messages to your doctor, view your test results, renew your prescriptions, schedule appointments, and more. How Do I Sign Up? 1. In your internet browser, go to [email or web address]  2. Click on the First Time User? Click Here link in the Sign In box. You will be redirect to the New Member Sign Up page. 3. Enter your ImageSpike Access Code exactly as it appears below. You will not need to use this code after youve completed the sign-up process. If you do not sign up before the expiration date, you must request a new code. ImageSpike Access Code: [email or web address]  Expires: 3/22/2017  9:35 AM (This is the date your ImageSpike access code will )    4. Enter the last four digits of your Social Security Number (xxxx) and Date of Birth (mm/dd/yyyy) as indicated and click Submit. You will be taken to the next sign-up page. 5. Create a ImageSpike ID. This will be your ImageSpike login ID and cannot be changed, so think of one that is secure and easy to remember. 6. Create a ImageSpike password. You can change your password at any time. 7. Enter your Password Reset Question and Answer. This can be used at a later time if you forget your password. 8. Enter your e-mail address. You will receive e-mail notification when new information is available in [address]. 9. Click Sign Up. You can now view and download portions of your medical record. 10. Click the Download Summary menu link to download a portable copy of your medical information. Additional Information    If you have questions, please visit the Frequently Asked Questions section of the ImageSpike website at [email or web address] Remember, ImageSpike is NOT to be used for urgent needs. For medical emergencies, dial 911.          Neck Arthritis: Exercises  Your Care Instructions  Here are some examples of typical rehabilitation exercises for your condition. Start each exercise slowly. Ease off the exercise if you start to have pain. Your doctor or physical therapist will tell you when you can start these exercises and which ones will work best for you. How to do the exercises  Neck stretches to the side    1. This stretch works best if you keep your shoulder down as you lean away from it. To help you remember to do this, start by relaxing your shoulders and lightly holding on to your thighs or your chair. 2. Tilt your head toward your shoulder and hold for 15 to 30 seconds. Let the weight of your head stretch your muscles. 3. Repeat 2 to 4 times toward each shoulder. Chin tuck    1. Lie on the floor with a rolled-up towel under your neck. Your head should be touching the floor. 2. Slowly bring your chin toward your chest.  3. Hold for a count of 6, and then relax for up to 10 seconds. 4. Repeat 8 to 12 times. Active cervical rotation    1. Sit in a firm chair, or stand up straight. 2. Keeping your chin level, turn your head to the right, and hold for 15 to 30 seconds. 3. Turn your head to the left and hold for 15 to 30 seconds. 4. Repeat 2 to 4 times to each side. Shoulder blade squeeze    1. While standing, squeeze your shoulder blades together. 2. Do not raise your shoulders up as you are squeezing. 3. Hold for 6 seconds. 4. Repeat 8 to 12 times. Shoulder rolls    1. Sit comfortably with your feet shoulder-width apart. You can also do this exercise standing up. 2. Roll your shoulders up, then back, and then down in a smooth, circular motion. 3. Repeat 2 to 4 times. Follow-up care is a key part of your treatment and safety. Be sure to make and go to all appointments, and call your doctor if you are having problems. It's also a good idea to know your test results and keep a list of the medicines you take. Where can you learn more?   Go to http://rivka-jarrell.info/. Enter P874 in the search box to learn more about \"Neck Arthritis: Exercises. \"  Current as of: May 23, 2016  Content Version: 11.1  © 9469-6206 [employer]. Care instructions adapted under license by [employer] (which disclaims liability or warranty for this information). If you have questions about a medical condition or this instruction, always ask your healthcare professional. [place] [address] any warranty or liability for your use of this information. Spinal Stenosis: Care Instructions  Your Care Instructions    Spinal stenosis is a narrowing of the canal that surrounds the spinal cord and nerve roots. The spine is made up of bones, or vertebrae. The spinal cord runs through an opening in the bones called the spinal canal. Sometimes, bone and ligament and disc tissue grow into this canal and press on the nerves that branch out from the spinal cord. This causes pain, numbness, or weakness--most often in the arms, legs, feet, and rear end (buttocks). Spinal stenosis can happen as you age. It can occur in the lower back or the neck. You may be able to treat spinal stenosis with pain medicine and exercises to keep your spine strong and flexible. Your doctor may suggest physical therapy. Some people try cortisone (corticosteroid) shots to reduce swelling. However, you may need surgery if your pain and numbness are so bad that you cannot do normal activities. Follow-up care is a key part of your treatment and safety. Be sure to make and go to all appointments, and call your doctor if you are having problems. It's also a good idea to know your test results and keep a list of the medicines you take. How can you care for yourself at home? · Ask your doctor if you can take an over-the-counter pain medicine, such as acetaminophen (Tylenol), ibuprofen (Advil, Motrin), or naproxen (Aleve). Be safe with medicines.  Read and follow all instructions on the label. · Reach and stay at a healthy weight. Too much weight is hard on your spine. · Change positions when sitting to ease pain. For example, lean forward. This may reduce pressure on the spinal cord and its nerves. · Stretch your back muscles as your doctor or physical therapist recommends. Here are a few exercises to try if your doctor says it is okay. ¨ Lie on your back and gently pull one bent knee to your chest. Put that foot back on the floor and then pull the other knee to your chest.  ¨ Do pelvic tilts. Lie on your back with your knees bent. Tighten your stomach muscles. Pull your belly button (navel) in and up toward your ribs. You should feel like your back is pressing to the floor and your hips and pelvis are slightly lifting off the floor. Hold for 6 seconds while breathing smoothly. ¨ Press your back flat against a wall and slide down into a half squat. Hold for 6 seconds while breathing normally. When should you call for help? Call 911 anytime you think you may need emergency care. For example, call if:  · You are unable to move a leg at all. Call your doctor now or seek immediate medical care if:  · You have new or worse symptoms in your arms, legs, chest, belly, or buttocks. Symptoms may include:  ¨ Numbness or tingling. ¨ Weakness. ¨ Pain. · You lose bladder or bowel control. Watch closely for changes in your health, and be sure to contact your doctor if:  · You are not getting better as expected. Where can you learn more? Go to http://rivka-jarrell.info/. Enter V369 in the search box to learn more about \"Spinal Stenosis: Care Instructions. \"  Current as of: May 23, 2016  Content Version: 11.1  © 8621-6807 [employer]. Care instructions adapted under license by [employer] (which disclaims liability or warranty for this information).  If you have questions about a medical condition or this instruction, always ask your healthcare professional. [place] [address] any warranty or liability for your use of this information.

## 2017-01-24 NOTE — PROGRESS NOTES
[facility]  [address] [place], [facility] [address]  Phone: [phone]  Fax: [phone]          HISTORY OF PRESENT ILLNESS:  Ayse Mckenzie is a 59 y.o. male with history of cervical pain. Pt currently attends cervical physical therapy and has experienced improvement in his ROM, particularly with left cervical flexion. He has one week left of physical therapy sessions and regularly performs exercises at home. Pt denies trying dry needling since his last office visit. He continues to experience headaches daily but reports that they are not as prolonged and are \"more broken up. \" Pt states that he has experienced relief in his headaches during physical therapy session, which occasionally lasts through the night. He has been given information on how to use a TheraCane at a previous office visit. Pt admits that he is currently on Coumadin. Pt continues to take Norco 7.5-325 mg very rarely as his pain warrants. Pt at this time desires to continue with current care. Pain Scale: 4/10    PCP: Francisco Madrid MD       Past Medical History   Diagnosis Date    Arthritis     Bleeding     Blood clot in the legs     Esophageal reflux     Headache(784.0)      migraine    High cholesterol     Hypertension     Lower back pain 11/6/2010    Other chest pain     Personal history of venous thrombosis and embolism     Pure hypercholesterolemia     Right buttock pain 11/6/2010    Right foot pain     Spinal stenosis     Tendonitis, tibialis      anterior    Thromboembolus ([facility] [place] [address]         Social History     Social History    Marital status:      Spouse name: N/A    Number of children: N/A    Years of education: N/A     Occupational History    Not on file.      Social History Main Topics    Smoking status: Never Smoker    Smokeless tobacco: Never Used    Alcohol use No    Drug use: No    Sexual activity: Not Currently     Other Topics Concern    Not on file     Social History Narrative       Current Outpatient Prescriptions   Medication Sig Dispense Refill    HYDROcodone-acetaminophen (NORCO) 7.5-325 mg per tablet 1 po bid prn severe pain 60 Tab 0    warfarin (COUMADIN) 5 mg tablet TAKE 2 AND 1/2 TABLETS BY MOUTH DAILY 75 Tab 0    lisinopril-hydroCHLOROthiazide (PRINZIDE, ZESTORETIC) 20-12.5 mg per tablet TAKE 1 TABLET BY MOUTH DAILY 30 Tab 0    acetaminophen (TYLENOL) 500 mg tablet Take  by mouth every six (6) hours as needed for Pain.  lansoprazole (PREVACID) 30 mg capsule Take 1 Cap by mouth Daily (before breakfast). 90 Cap 3    Butalbital-Acetaminophen-Caff -40 mg cap Take 2 capsules every 8 hours as needed for headache 540 Cap 3    labetalol (NORMODYNE) 100 mg tablet TAKE 1 TABLET BY MOUTH TWICE DAILY. 180 Tab 4    montelukast (SINGULAIR) 10 mg tablet Take 10 mg by mouth daily.  metaxalone (SKELAXIN) 800 mg tablet Take  by mouth.  predniSONE (DELTASONE) 10 mg tablet 3 tabs x 2 days, 2 tabs x 3 days, 1 tab x 4 days, 1/2 tab x 5 days 20 Tab 0       Allergies   Allergen Reactions    Augmentin [Amoxicillin-Pot Clavulanate] Itching    Penicillins Rash         REVIEW OF SYSTEMS    Constitutional: Negative for fever, chills, or weight change. Respiratory: Negative for cough or shortness of breath. Cardiovascular: Negative for chest pain or palpitations. Gastrointestinal: Negative for acid reflux, change in bowel habits, or constipation. Genitourinary: Negative for dysuria and flank pain. Musculoskeletal: Positive for cervical pain. Skin: Negative for rash. Neurological: Positive for headaches. Negative for dizziness or numbness. Endo/Heme/Allergies: Negative for increased bruising. Psychiatric/Behavioral: Negative for difficulty with sleep.       PHYSICAL EXAMINATION  Visit Vitals    /65    Pulse 82    Temp 98.2 °F (36.8 °C) (Oral)    Resp 18    Ht 5' 10\" (1.778 m)    Wt 220 lb (99.8 kg)    SpO2 96%  BMI 31.57 kg/m2       Constitutional: Awake, alert, and in no acute distress  Neurological: 1+ symmetrical DTRs in the upper extremities. Sensation to light touch is intact. Negative Eliecer's sign bilaterally. Skin: warm, dry, and intact. Musculoskeletal: Decreased left sided cervical flexion, improved since his last office visit. Minimally tight across the trapezius. Biceps  Triceps Deltoids Wrist Ext Wrist Flex Hand Intrin   Right +4/5 +4/5 +4/5 +4/5 +4/5 +4/5   Left +4/5 +4/5 +4/5 +4/5 +4/5 +4/5     IMAGING:    Cervical Spine MRI from 12/19/2016 was personally reviewed with the Pt and demonstrated:    Results from [place] encounter on 12/19/16   MRI CERV SPINE WO CONT   Narrative MRI of cervical spine without contrast    HISTORY: Chronic progressive neck pain with headaches. COMPARISON: No prior MRI. Cervical spine radiographs from 12/1/2016. TECHNIQUE: T1 weighted, T2 FSE, FSE inversion recovery sagittal images are  supplemented by T2 weighted and GRE/medic axial images. FINDINGS: Normal alignment and vertebral body heights. Normal marrow signal  except for mild endplate degenerative change. Cervical cord is normal in signal  and caliber. Visualized posterior fossa contents look normal. Paraspinal soft  tissues look normal.    C2-3: No significant degenerative disc disease or spinal stenosis. C3-4: Small nonfocal disc protrusion which contacts the ventral cord and causes  borderline spinal stenosis. No foraminal stenosis. C4-5: No significant degenerative disc disease. Mildly hypertrophic facets. No  spinal stenosis. C5-6: Disc is narrowed with diffusely bulging disc and osteophyte complex. Facets are mildly hypertrophic. Mild spinal canal and moderate left foraminal  stenoses. C6-7: Disc is narrowed with diffusely bulging disc and osteophyte complex. Facets are mildly hypertrophic. Mild spinal canal and moderate bilateral  foraminal stenoses.     C7-T1: No significant degenerative disc disease or spinal stenosis. Impression Impression:    Disc osteophyte complexes causing mild spinal canal stenoses at C5-6 and C6-7. Moderate left foraminal stenosis at C5-6 and moderate bilateral foraminal  stenoses at C6-7. ASSESSMENT   Julia Peace was seen today for back pain and neck pain. Diagnoses and all orders for this visit:    Cervical spinal stenosis    Cervical facet joint syndrome    Chronic tension-type headache, not intractable    Other chronic pain  -     HYDROcodone-acetaminophen (NORCO) 7.5-325 mg per tablet; 1 po bid prn severe pain  -     DRUG SCREEN UR - W/ CONFIRM; Future    Muscle spasm    Warfarin anticoagulation         IMPRESSION AND PLAN:  Antonia Frye is a 59 y.o. male with history of cervical pain. Pt currently attends cervical physical therapy and has experienced improvement in his ROM, particularly with left cervical flexion. He has one week left of physical therapy sessions and regularly performs exercises at home. Pt continues to experience headaches daily but reports that they are not as prolonged and are \"more broken up. \" Pt continues to take Norco 7.5-325 mg very rarely as his pain warrants. 1) Pt was given information on cervical arthritis exercises and spinal stenosis. 2) I encouraged the Pt to continue with exercises learned through physical therapy. 3) I recommended the Pt try moist heat. 4) I recommended the Pt try yoga. 5) I encouraged the Pt to try using a TheraCane. 6) He received a refill of Norco 7.5-325 mg 1 tab BID prn severe pain. 7) Mr. Rasta Hays has a reminder for a \"due or due soon\" health maintenance. I have asked that he contact his primary care provider, Dia Acosta MD,  for follow-up on this health maintenance. 8)  reviewed. 9) A UDS was ordered and the Pt signed a pain agreement. 10) Pt will follow-up as needed.       Written by Alisia Garcia, as dictated by Reese Capri, MD.

## 2017-01-25 PROBLEM — Z79.01 WARFARIN ANTICOAGULATION: Status: ACTIVE | Noted: 2017-01-25

## 2017-01-26 ENCOUNTER — HOSPITAL ENCOUNTER (OUTPATIENT)
Dept: PHYSICAL THERAPY | Age: 62
Discharge: HOME | End: 2017-01-26
Payer: COMMERCIAL

## 2017-01-26 PROCEDURE — 97110 THERAPEUTIC EXERCISES: CPT

## 2017-01-26 NOTE — PROGRESS NOTES
PT DAILY TREATMENT NOTE     Patient Name: Samaria Heard  Date:2017  : 1955  [x]  Patient  Verified  Payor: BLUE CROSS / Plan:  [facility] / Product Type: PPO /    In time:5:30  Out time: 6:30  Total Treatment Time (min): 60  Visit #: 3 of 8    Treatment Area: Other cervical disc degeneration, unspecified cervical region [M50.30]  Other specific arthropathies, not elsewhere classified, other specified site [M12.88]  Other muscle spasm [M62.838]  Other headache syndrome [G44.89]    SUBJECTIVE  Pain Level (0-10 scale): 4/10 HA; 3/10 neck  Any medication changes, allergies to medications, adverse drug reactions, diagnosis change, or new procedure performed?: [x] No    [] Yes (see summary sheet for update)  Subjective functional status/changes:   [] No changes reported  Pt reports more of a headache today but was driving around town more today and looking forward to see stop lights. Pt states he hasn't used the balls much for headache relief but they do work. Pt stated MD said to decide with therapy plan moving forward.      OBJECTIVE    Modality rationale: decrease pain and increase tissue extensibility to improve the patients ability to turn his head and decrease headache symptoms   Min Type Additional Details    [] Estim:  []Unatt       []IFC  []Premod                        []Other:  []w/ice   []w/heat  Position:  Location:    [] Estim: []Att    []TENS instruct  []NMES                    []Other:  []w/US   []w/ice   []w/heat  Position:  Location:    []  Traction: [] Cervical       []Lumbar                       [] Prone          []Supine                       []Intermittent   []Continuous Lbs:  [] before manual  [] after manual    []  Ultrasound: []Continuous   [] Pulsed                           []1MHz   []3MHz W/cm2:  Location:    []  Iontophoresis with dexamethasone         Location: [] Take home patch   [] In clinic   10 []  Ice     [x]  heat  []  Ice massage  []  Laser   [] Anodyne Position: seated  Location: c/s    []  Laser with stim  []  Other:  Position:  Location:    []  Vasopneumatic Device Pressure:       [] lo [] med [] hi   Temperature: [] lo [] med [] hi   [x] Skin assessment post-treatment:  [x]intact []redness- no adverse reaction    []redness - adverse reaction:     50 min Therapeutic Exercise:  [x] See flow sheet :   Rationale: increase ROM and increase strength to improve the patients ability to improve ease of head turning for ADLs and posture awareness for decreased headaches          With   [] TE   [] TA   [] neuro   [] other: Patient Education: [x] Review HEP    [] Progressed/Changed HEP based on:   [] positioning   [] body mechanics   [] transfers   [] heat/ice application    [] other:      Other Objective/Functional Measures: FOTO: 62  Educated on head against head rest in car  Giving pt 1.5 weeks to work independently and will likely D/C with updated HEP  Continues to have forward head posture     Pain Level (0-10 scale) post treatment: 0/10 headache; 1/10 c/s    ASSESSMENT/Changes in Function: Pt making slow progress towards final goals in therapy. Pt continues to have forward head posture when driving causing increased suboccipital headaches. Pt has good compliance with HEP but needs to work on self TPR with theracane and use of tennis balls for SOR for relief of headaches. Will continue working on independence with management of symptoms for ease of ADLs and driving without headaches. Patient will continue to benefit from skilled PT services to modify and progress therapeutic interventions, address functional mobility deficits, address ROM deficits and address strength deficits to attain remaining goals. Progress towards goals / Updated goals:  1. Pt will improve FOTO score by 6 points to demonstrate improved functional abilities. 2. Pt will report <4/10 pain during daily activities to improve QOL.   3. Pt will report 2 headaches per week for 2 weeks to demonstrate improved symptoms. Progressing 1/19/17 Reports daily headaches with reduce intensity and severity   4. Pt will improve cervical ROM to 75% in each direction to increase ease with driving. 5. Pt will demonstrate proper posturing during standing and sitting activities in thoracic and cervical spine to increase ease with work and Mos[employer] activities.      PLAN  [x]  Upgrade activities as tolerated     [x]  Continue plan of care  []  Update interventions per flow sheet       []  Discharge due to:_  []  Other:_      Brandi Brian PTA 1/26/2017  3:07 PM    Future Appointments  Date Time Provider [place]   1/26/2017 5:30 PM Brandi Brian [place] [id number]   3/7/2017 3:30 PM Prisca Sherwood  [address]    3/23/2017 4:15 PM Joyce Lim  [address] [place]

## 2017-01-31 ENCOUNTER — OFFICE VISIT (OUTPATIENT)
Dept: INTERNAL MEDICINE CLINIC | Age: 62
End: 2017-01-31

## 2017-01-31 VITALS
OXYGEN SATURATION: 90 % | TEMPERATURE: 97.8 F | SYSTOLIC BLOOD PRESSURE: 146 MMHG | HEART RATE: 76 BPM | BODY MASS INDEX: 31.78 KG/M2 | WEIGHT: 222 LBS | DIASTOLIC BLOOD PRESSURE: 82 MMHG | RESPIRATION RATE: 18 BRPM | HEIGHT: 70 IN

## 2017-01-31 DIAGNOSIS — K08.89 TOOTH PAIN: ICD-10-CM

## 2017-01-31 DIAGNOSIS — J06.9 ACUTE URI: Primary | ICD-10-CM

## 2017-01-31 DIAGNOSIS — R51.9 FACIAL PAIN: ICD-10-CM

## 2017-01-31 RX ORDER — AZITHROMYCIN 250 MG/1
TABLET, FILM COATED ORAL
Qty: 6 TAB | Refills: 0 | Status: SHIPPED | OUTPATIENT
Start: 2017-01-31 | End: 2017-04-07 | Stop reason: ALTCHOICE

## 2017-01-31 RX ORDER — FLUTICASONE PROPIONATE 50 MCG
SPRAY, SUSPENSION (ML) NASAL
Qty: 1 BOTTLE | Refills: 1 | Status: SHIPPED | OUTPATIENT
Start: 2017-01-31 | End: 2017-04-13 | Stop reason: ALTCHOICE

## 2017-01-31 NOTE — MR AVS SNAPSHOT
Visit Information Date & Time Provider Department Dept. Phone Encounter #  
 1/31/2017  9:45 AM Criselda Parson NP [facility] INTERNAL MEDICINE OF Lisa Rios [phone] [id number] Your Appointments 3/7/2017  3:30 PM  
Follow Up with Shun Pagan MD  
[address] [facility], [address] and [facility]ists [facility] [address]) Appt Note: 6 WK FU/NO COPAY PT BEING SEEN UNDER WC TODAY  
 [address] [facility] [address]  
[phone]  
  
   
 [address] [facility],[address]  
  
    
 3/23/2017  4:15 PM  
Follow Up with Jonathan Payne MD  
[facility] INTERNAL MEDICINE OF [place] [address]) Appt Note: 4 mos [facility], [address] [facility] [address]  
  
   
 [address] [facility], [address] [facility] [address] Upcoming Health Maintenance Date Due Hepatitis C Screening 1955 DTaP/Tdap/Td series (1 - Tdap) 4/13/1974 ZOSTER VACCINE AGE 60> 4/13/2013 INFLUENZA AGE 9 TO ADULT 8/1/2016 COLONOSCOPY 5/27/2026 Allergies as of 1/31/2017  Review Complete On: 1/31/2017 By: Madeline Hagen LPN Severity Noted Reaction Type Reactions Augmentin [Amoxicillin-pot Clavulanate]    Itching Penicillins    Rash Current Immunizations  Never Reviewed No immunizations on file. Not reviewed this visit You Were Diagnosed With   
  
 Codes Comments Acute URI    -  Primary ICD-10-CM: J06.9 ICD-9-CM: 465.9 Facial pain     ICD-10-CM: P81 ICD-9-CM: 784.0 Tooth pain     ICD-10-CM: K08.89 ICD-9-CM: 525.9 Vitals BP Pulse Temp Resp Height(growth percentile) 146/82 (BP 1 Location: Left arm, BP Patient Position: Sitting) 76 97.8 °F (36.6 °C) (Tympanic) 18 5' 10\" (1.778 m) Weight(growth percentile) SpO2 BMI Smoking Status 222 lb (100.7 kg) 90% 31.85 kg/m2 Never Smoker BMI and BSA Data  Body Mass Index Body Surface Area  
 31.85 kg/m 2 2.23 m 2  
 Preferred Pharmacy Pharmacy Name Phone [facility] [address] [place] [address] [phone] Your Updated Medication List  
  
   
This list is accurate as of: 17 10:23 AM.  Always use your most recent med list.  
  
  
  
  
 acetaminophen 500 mg tablet Commonly known as:  TYLENOL Take  by mouth every six (6) hours as needed for Pain. azithromycin 250 mg tablet Commonly known as:  Susy Castillo Take 2 tablets today, then take 1 tablet daily  
  
 butalbital-acetaminophen-caff -40 mg per capsule Commonly known as:  [employer] Take 2 capsules every 8 hours as needed for headache  
  
 fluticasone 50 mcg/actuation nasal spray Commonly known as:  FLONASE  
2 spray each nostril daily HYDROcodone-acetaminophen 7.5-325 mg per tablet Commonly known as:  March Castles 1 po bid prn severe pain  
  
 labetalol 100 mg tablet Commonly known as:  NORMODYNE  
TAKE 1 TABLET BY MOUTH TWICE DAILY. lansoprazole 30 mg capsule Commonly known as:  PREVACID Take 1 Cap by mouth Daily (before breakfast). lisinopril-hydroCHLOROthiazide 20-12.5 mg per tablet Commonly known as:  PRINZIDE, ZESTORETIC  
TAKE 1 TABLET BY MOUTH DAILY  
  
 metaxalone 800 mg tablet Commonly known as:  SKELAXIN Take  by mouth.  
  
 montelukast 10 mg tablet Commonly known as:  SINGULAIR Take 10 mg by mouth daily. predniSONE 10 mg tablet Commonly known as:  DELTASONE  
3 tabs x 2 days, 2 tabs x 3 days, 1 tab x 4 days, 1/2 tab x 5 days  
  
 warfarin 5 mg tablet Commonly known as:  COUMADIN  
TAKE 2 AND 1/2 TABLETS BY MOUTH DAILY Prescriptions Sent to Pharmacy Refills  
 fluticasone (FLONASE) 50 mcg/actuation nasal spray 1 Si spray each nostril daily Class: Normal  
 Pharmacy: [facility] [address] [place] [address] [address] [place] [address] Ph #: [phone] azithromycin (ZITHROMAX) 250 mg tablet 0 Sig: Take 2 tablets today, then take 1 tablet daily Class: Normal  
 Pharmacy: [facility] [address] [place] [address] [place] [address] [place] [address] #: [phone] To-Do List   
 02/09/2017 5:30 PM  
  Appointment with Antonia Lomeli at [address] [place] [address] ([phone]) Introducing [address]! [facility] introduces Piazza patient portal. Now you can access parts of your medical record, email your doctor's office, and request medication refills online. 1. In your internet browser, go to [email or web address] 2. Click on the First Time User? Click Here link in the Sign In box. You will see the New Member Sign Up page. 3. Enter your Piazza Access Code exactly as it appears below. You will not need to use this code after youve completed the sign-up process. If you do not sign up before the expiration date, you must request a new code. · Piazza Access Code: [email or web address] Expires: 3/22/2017  9:35 AM 
 
4. Enter the last four digits of your Social Security Number (xxxx) and Date of Birth (mm/dd/yyyy) as indicated and click Submit. You will be taken to the next sign-up page. 5. Create a Piazza ID. This will be your Piazza login ID and cannot be changed, so think of one that is secure and easy to remember. 6. Create a Piazza password. You can change your password at any time. 7. Enter your Password Reset Question and Answer. This can be used at a later time if you forget your password. 8. Enter your e-mail address. You will receive e-mail notification when new information is available in [address] 9. Click Sign Up. You can now view and download portions of your medical record. 10. Click the Download Summary menu link to download a portable copy of your medical information.  
 
If you have questions, please visit the Frequently Asked Questions section of the [employer]. Remember, [employer] is NOT to be used for urgent needs. For medical emergencies, dial 911. Now available from your iPhone and Android! Please provide this summary of care documentation to your next provider. Your primary care clinician is listed as Maged Salazar. If you have any questions after today's visit, please call [phone].

## 2017-01-31 NOTE — PROGRESS NOTES
Brittni Sullivan is a 59 y.o. male presenting today for Sinus Pain (x3 weeks)  . HPI:  Brittni Sullivan presents to the office today for right side facial pain, ear pain and jaw pain. Patient states he went to the Dentist and was told the pain is not coming from his gums. He has nasal congestion but denied any cough. Review of Systems   Constitutional: Negative for chills and fever. HENT: Positive for congestion and ear pain. Negative for sore throat. Respiratory: Negative for cough, sputum production and shortness of breath. Cardiovascular: Negative for chest pain and palpitations. Neurological: Positive for headaches. Allergies   Allergen Reactions    Augmentin [Amoxicillin-Pot Clavulanate] Itching    Penicillins Rash       Current Outpatient Prescriptions   Medication Sig Dispense Refill    fluticasone (FLONASE) 50 mcg/actuation nasal spray 2 spray each nostril daily 1 Bottle 1    azithromycin (ZITHROMAX) 250 mg tablet Take 2 tablets today, then take 1 tablet daily 6 Tab 0    HYDROcodone-acetaminophen (NORCO) 7.5-325 mg per tablet 1 po bid prn severe pain 60 Tab 0    warfarin (COUMADIN) 5 mg tablet TAKE 2 AND 1/2 TABLETS BY MOUTH DAILY 75 Tab 0    lisinopril-hydroCHLOROthiazide (PRINZIDE, ZESTORETIC) 20-12.5 mg per tablet TAKE 1 TABLET BY MOUTH DAILY 30 Tab 0    acetaminophen (TYLENOL) 500 mg tablet Take  by mouth every six (6) hours as needed for Pain.  lansoprazole (PREVACID) 30 mg capsule Take 1 Cap by mouth Daily (before breakfast). 90 Cap 3    Butalbital-Acetaminophen-Caff -40 mg cap Take 2 capsules every 8 hours as needed for headache 540 Cap 3    labetalol (NORMODYNE) 100 mg tablet TAKE 1 TABLET BY MOUTH TWICE DAILY. 180 Tab 4    montelukast (SINGULAIR) 10 mg tablet Take 10 mg by mouth daily.  metaxalone (SKELAXIN) 800 mg tablet Take  by mouth.       predniSONE (DELTASONE) 10 mg tablet 3 tabs x 2 days, 2 tabs x 3 days, 1 tab x 4 days, 1/2 tab x 5 days 20 Tab 0       Past Medical History   Diagnosis Date    Arthritis     Bleeding     Blood clot in the legs     Esophageal reflux     Headache(784.0)      migraine    High cholesterol     Hypertension     Lower back pain 11/6/2010    Other chest pain     Personal history of venous thrombosis and embolism     Pure hypercholesterolemia     Right buttock pain 11/6/2010    Right foot pain     Spinal stenosis     Tendonitis, tibialis      anterior    Thromboembolus ([facility] [address]        Past Surgical History   Procedure Laterality Date    Hx other surgical       lower back (1992 and 2000)    Hx other surgical       left foot (2008)    Nerve block      Pr laminotomy      Hx orthopaedic  06-25-12     Right foot with excision of bursa and adipose tissue from fifth metatarsal base by Dr. Carlos Kidd History     Social History    Marital status:      Spouse name: N/A    Number of children: N/A    Years of education: N/A     Occupational History    Not on file. Social History Main Topics    Smoking status: Never Smoker    Smokeless tobacco: Never Used    Alcohol use No    Drug use: No    Sexual activity: Not Currently     Other Topics Concern    Not on file     Social History Narrative       Patient does not have an advanced directive on file    Vitals:    01/31/17 0954   BP: 146/82   Pulse: 76   Resp: 18   Temp: 97.8 °F (36.6 °C)   TempSrc: Tympanic   SpO2: 90%   Weight: 222 lb (100.7 kg)   Height: 5' 10\" (1.778 m)   PainSc:   6   PainLoc: Face       Physical Exam   Constitutional: No distress. HENT:   Right Ear: External ear normal.   Left Ear: External ear normal.   Neck: Neck supple. Cardiovascular: Normal rate, regular rhythm and normal heart sounds. No murmur heard. Pulmonary/Chest: Effort normal and breath sounds normal.   Lymphadenopathy:     He has no cervical adenopathy. Nursing note and vitals reviewed.       Hospital Outpatient Visit on 01/18/2017   Component Date Value Ref Range Status    Color 01/18/2017 YELLOW    Final    Appearance 01/18/2017 CLEAR    Final    Specific gravity 01/18/2017 1.013  1.005 - 1.030   Final    pH (UA) 01/18/2017 7.5  5.0 - 8.0   Final    Protein 01/18/2017 NEGATIVE   NEG mg/dL Final    Glucose 01/18/2017 NEGATIVE   NEG mg/dL Final    Ketone 01/18/2017 NEGATIVE   NEG mg/dL Final    Bilirubin 01/18/2017 NEGATIVE   NEG   Final    Blood 01/18/2017 NEGATIVE   NEG   Final    Urobilinogen 01/18/2017 0.2  0.2 - 1.0 EU/dL Final    Nitrites 01/18/2017 NEGATIVE   NEG   Final    Leukocyte Esterase 01/18/2017 NEGATIVE   NEG   Final    WBC 01/18/2017 NEGATIVE   0 - 4 /hpf Final    RBC 01/18/2017 NEGATIVE   0 - 5 /hpf Final    Epithelial cells 01/18/2017 NEGATIVE   0 - 5 /lpf Final    Bacteria 01/18/2017 NEGATIVE   NEG /hpf Final    Prostate Specific Ag 01/18/2017 3.0  0.0 - 4.0 ng/mL Final   Lab Only on 01/18/2017   Component Date Value Ref Range Status    VALID INTERNAL CONTROL POC 01/18/2017 Yes   Final    INR POC 01/18/2017 2.0   Final   Lab Only on 12/28/2016   Component Date Value Ref Range Status    Color (UA POC) 12/28/2016 Yellow   Final    Clarity (UA POC) 12/28/2016 Clear   Final    Glucose (UA POC) 12/28/2016 Negative  Negative Final    Bilirubin (UA POC) 12/28/2016 Negative  Negative Final    Ketones (UA POC) 12/28/2016 Negative  Negative Final    Specific gravity (UA POC) 12/28/2016 1.020  1.001 - 1.035 Final    Blood (UA POC) 12/28/2016 2+  Negative Final    pH (UA POC) 12/28/2016 7.0  4.6 - 8.0 Final    Protein (UA POC) 12/28/2016 Negative  Negative mg/dL Final    Urobilinogen (UA POC) 12/28/2016 0.2 mg/dL  0.2 - 1 Final    Nitrites (UA POC) 12/28/2016 Negative  Negative Final    Leukocyte esterase (UA POC) 12/28/2016 Negative  Negative Final    VALID INTERNAL CONTROL POC 12/28/2016 Yes   Final    INR POC 12/28/2016 1.8   Final   Lab Only on 12/02/2016   Component Date Value Ref Range Status    VALID INTERNAL CONTROL POC 12/02/2016 Yes   Final    INR POC 12/02/2016 2.5   Final   Lab Only on 11/18/2016   Component Date Value Ref Range Status    VALID INTERNAL CONTROL POC 11/18/2016 Yes   Final    INR POC 11/18/2016 1.9   Final       .No results found for any visits on 01/31/17. Assessment / Plan:      ICD-10-CM ICD-9-CM    1. Acute URI J06.9 465.9 fluticasone (FLONASE) 50 mcg/actuation nasal spray      azithromycin (ZITHROMAX) 250 mg tablet   2. Facial pain R51 784.0    3. Tooth pain K08.89 525.9        Continue Norco for pain  Flonase rx  Z-pack rx  F/u prn    Follow-up Disposition:  Return if symptoms worsen or fail to improve. I asked the patient if he  had any questions and answered his  questions.   The patient stated that he understands the treatment plan and agrees with the treatment plan

## 2017-01-31 NOTE — PROGRESS NOTES
Patient presents for   Chief Complaint   Patient presents with    Sinus Pain     x3 weeks     Fall risk assessment was not indicated. Depression screening was not indicated Follow up questions were not indicated. 1. Have you been to the ER, urgent care clinic since your last visit? Hospitalized since your last visit? No    2. Have you seen or consulted any other health care providers outside of the [address] [place] [address] since your last visit? Include any pap smears or colon screening.  No

## 2017-02-05 ENCOUNTER — TELEPHONE (OUTPATIENT)
Dept: INTERNAL MEDICINE CLINIC | Age: 62
End: 2017-02-05

## 2017-02-05 RX ORDER — CLINDAMYCIN HYDROCHLORIDE 300 MG/1
300 CAPSULE ORAL 3 TIMES DAILY
Qty: 30 CAP | Refills: 0 | Status: SHIPPED | OUTPATIENT
Start: 2017-02-05 | End: 2017-02-15

## 2017-02-05 NOTE — TELEPHONE ENCOUNTER
The patient persists with facial fullness and facial pressure. No improvement with zithromax. Will go to Clindamycin.   he is to call if symptoms persist over 4 days

## 2017-02-06 ENCOUNTER — ANTI-COAG VISIT (OUTPATIENT)
Dept: INTERNAL MEDICINE CLINIC | Age: 62
End: 2017-02-06

## 2017-02-06 DIAGNOSIS — Z86.718 PERSONAL HISTORY OF VENOUS THROMBOSIS AND EMBOLISM: ICD-10-CM

## 2017-02-06 DIAGNOSIS — Z79.01 WARFARIN ANTICOAGULATION: ICD-10-CM

## 2017-02-06 LAB
INR BLD: 2.2
PT POC: NORMAL SEC
VALID INTERNAL CONTROL?: YES

## 2017-02-06 NOTE — PROGRESS NOTES
Mr. Briseida Marie is here today for anticoagulation monitoring for the diagnosis of DVT. His INR goal is 2.0-3.0 and his current Coumadin dose is 82.5 mg. Today's findings include an INR of 2.2 (normal INR range 0.8-1.2). Considering Mr. Winters's past history, todays findings, and per the coumadin policy/protocol, Mr. Tita Rebolledo was instructed to take Coumadin as follows,  Continue 12.5 mg 5 days a week and 10 mg the other 2 days. He was also instructed to schedule an appointment in 3 weeks prior to leaving for an INR check. A full discussion of the nature of anticoagulants has been carried out. A full discussion of the need for frequent and regular monitoring, precise dosage adjustment and compliance was stressed. Side effects of potential bleeding were discussed and Mr. Tita Rebolledo was instructed to call [phone] if there are any signs of abnormal bleeding. Mr. Tita Rebolledo was instructed to avoid any OTC items containing aspirin or ibuprofen and prior to starting any new OTC products to consult with his physician or pharmacist to ensure no drug interactions are present. Mr. Tita Rebolledo was instructed to avoid any major changes in his general diet and to avoid alcohol consumption. .    Mr. Tita Rebolledo was provided a literature booklet, \"Treatment with Warfarin (Coumadin)\", that includes topics on understanding coumadin therapy, drug interaction considerations, vitamin K and coumadin use, interactions with foods and supplements containing vitamin K, and the use of herbal products. Mr. Tita Rebolledo verbalized his understanding of all instructions and will call the office with any questions, concerns, or signs of abnormal bleeding or blood clot.

## 2017-02-07 RX ORDER — MONTELUKAST SODIUM 10 MG/1
TABLET ORAL
Qty: 90 TAB | Refills: 0 | Status: SHIPPED | OUTPATIENT
Start: 2017-02-07 | End: 2017-05-08 | Stop reason: SDUPTHER

## 2017-02-13 ENCOUNTER — ANTI-COAG VISIT (OUTPATIENT)
Dept: INTERNAL MEDICINE CLINIC | Age: 62
End: 2017-02-13

## 2017-02-13 ENCOUNTER — HOSPITAL ENCOUNTER (OUTPATIENT)
Dept: LAB | Age: 62
Discharge: HOME | End: 2017-02-13
Payer: COMMERCIAL

## 2017-02-13 DIAGNOSIS — Z86.718 PERSONAL HISTORY OF VENOUS THROMBOSIS AND EMBOLISM: ICD-10-CM

## 2017-02-13 DIAGNOSIS — R30.0 BURNING WITH URINATION: ICD-10-CM

## 2017-02-13 DIAGNOSIS — Z79.01 WARFARIN ANTICOAGULATION: ICD-10-CM

## 2017-02-13 DIAGNOSIS — R30.0 BURNING WITH URINATION: Primary | ICD-10-CM

## 2017-02-13 LAB
APPEARANCE UR: CLEAR
BILIRUB UR QL: NEGATIVE
COLOR UR: YELLOW
GLUCOSE UR STRIP.AUTO-MCNC: NEGATIVE MG/DL
HGB UR QL STRIP: NEGATIVE
INR BLD: 2.6
KETONES UR QL STRIP.AUTO: NEGATIVE MG/DL
LEUKOCYTE ESTERASE UR QL STRIP.AUTO: NEGATIVE
NITRITE UR QL STRIP.AUTO: NEGATIVE
PH UR STRIP: 5.5 [PH] (ref 5–8)
PROT UR STRIP-MCNC: NEGATIVE MG/DL
PT POC: NORMAL SEC
SP GR UR REFRACTOMETRY: 1.01 (ref 1–1.03)
UROBILINOGEN UR QL STRIP.AUTO: 0.2 EU/DL (ref 0.2–1)
VALID INTERNAL CONTROL?: YES

## 2017-02-13 PROCEDURE — 81003 URINALYSIS AUTO W/O SCOPE: CPT | Performed by: INTERNAL MEDICINE

## 2017-02-13 PROCEDURE — 87086 URINE CULTURE/COLONY COUNT: CPT | Performed by: INTERNAL MEDICINE

## 2017-02-13 RX ORDER — LISINOPRIL AND HYDROCHLOROTHIAZIDE 12.5; 2 MG/1; MG/1
TABLET ORAL
Qty: 30 TAB | Refills: 0 | Status: SHIPPED | OUTPATIENT
Start: 2017-02-13 | End: 2017-03-07 | Stop reason: SDUPTHER

## 2017-02-13 RX ORDER — LISINOPRIL AND HYDROCHLOROTHIAZIDE 12.5; 2 MG/1; MG/1
TABLET ORAL
Qty: 30 TAB | Refills: 0 | Status: SHIPPED | OUTPATIENT
Start: 2017-02-13 | End: 2017-05-30 | Stop reason: SDUPTHER

## 2017-02-13 NOTE — PROGRESS NOTES
Mr. Stevie Song is here today for anticoagulation monitoring for the diagnosis of DVT. His INR goal is 2.0-3.0 and his current Coumadin dose is 10mg Mon and thurs, then 12.5mg all other days. Today's findings include an INR of 2.6 (normal INR range 2.0-3.0). Considering Mr. Winters's past history, todays findings, and per the coumadin policy/protocol, Mr. Bruno Garza was instructed to take Coumadin as follows,  Hold coumadin tonight and tomorrow then resume 10mg Mon and thurs, then 12.5mg all other days. He was also instructed to schedule an appointment in 3 weeks prior to leaving for an INR check. A full discussion of the nature of anticoagulants has been carried out. A full discussion of the need for frequent and regular monitoring, precise dosage adjustment and compliance was stressed. Side effects of potential bleeding were discussed and Mr. Bruno Garza was instructed to call [phone] if there are any signs of abnormal bleeding. Mr. Bruno Garza was instructed to avoid any OTC items containing aspirin or ibuprofen and prior to starting any new OTC products to consult with his physician or pharmacist to ensure no drug interactions are present. Mr. Bruno Garza was instructed to avoid any major changes in his general diet and to avoid alcohol consumption. .      Mr. Bruno Garza verbalized his understanding of all instructions and will call the office with any questions, concerns, or signs of abnormal bleeding or blood clot. Mr Bruno Garza also reported continued burning with urination, Dr Prem Raza gave verbal order for U/A C&S order read back and confirmed.

## 2017-02-13 NOTE — MR AVS SNAPSHOT
Visit Information Date & Time Provider Department Dept. Phone Encounter #  
 2/13/2017 11:30 AM LAB [facility] Jacki Rebolledo [phone] [id number] Your Appointments 3/7/2017  3:30 PM  
Follow Up with Rajinder Cole MD  
[address] [facility], [address] [facility] [address] [facility]) Appt Note: 6 WK FU/NO COPAY PT BEING SEEN UNDER WC TODAY  
 [address] [facility] [address]  
[phone]  
  
   
 [address] [facility],[address]  
  
    
 3/23/2017  4:15 PM  
Follow Up with Lyly Hall MD  
[facility]IC[facility] OF [place] [address] [facility]) Appt Note: 4 mos [address] [facility], [address] [facility] [address]  
  
   
 [address] [facility], [address] [facility] [address] Upcoming Health Maintenance Date Due Hepatitis C Screening 1955 DTaP/Tdap/Td series (1 - Tdap) 4/13/1974 ZOSTER VACCINE AGE 60> 4/13/2013 INFLUENZA AGE 9 TO ADULT 8/1/2016 COLONOSCOPY 5/27/2026 Allergies as of 2/13/2017  Review Complete On: 2/13/2017 By: Jazmyn Ramsey LPN Severity Noted Reaction Type Reactions Augmentin [Amoxicillin-pot Clavulanate]    Itching Penicillins    Rash Current Immunizations  Never Reviewed No immunizations on file. Not reviewed this visit You Were Diagnosed With   
  
 Codes Comments Warfarin anticoagulation     ICD-10-CM: Z79.01 
ICD-9-CM: V58.61 Personal history of venous thrombosis and embolism     ICD-10-CM: Z86.718 ICD-9-CM: V12.51 Vitals Smoking Status Never Smoker Preferred Pharmacy Pharmacy Name Phone [facility] [address] [place] [address] [phone] Your Updated Medication List  
  
   
This list is accurate as of: 2/13/17 11:56 AM.  Always use your most recent med list.  
  
  
  
  
 acetaminophen 500 mg tablet Commonly known as:  TYLENOL Take  by mouth every six (6) hours as needed for Pain. azithromycin 250 mg tablet Commonly known as:  Susy Jonathan Take 2 tablets today, then take 1 tablet daily  
  
 butalbital-acetaminophen-caff -40 mg per capsule Commonly known as:  [employer] Take 2 capsules every 8 hours as needed for headache  
  
 clindamycin 300 mg capsule Commonly known as:  CLEOCIN Take 1 Cap by mouth three (3) times daily for 10 days. fluticasone 50 mcg/actuation nasal spray Commonly known as:  FLONASE  
2 spray each nostril daily HYDROcodone-acetaminophen 7.5-325 mg per tablet Commonly known as:  March Castles 1 po bid prn severe pain  
  
 labetalol 100 mg tablet Commonly known as:  NORMODYNE  
TAKE 1 TABLET BY MOUTH TWICE DAILY. lansoprazole 30 mg capsule Commonly known as:  PREVACID Take 1 Cap by mouth Daily (before breakfast). lisinopril-hydroCHLOROthiazide 20-12.5 mg per tablet Commonly known as:  PRINZIDE, ZESTORETIC  
TAKE 1 TABLET BY MOUTH DAILY  
  
 metaxalone 800 mg tablet Commonly known as:  SKELAXIN Take  by mouth.  
  
 montelukast 10 mg tablet Commonly known as:  SINGULAIR  
TAKE 1 TABLET BY MOUTH EVERY DAY  
  
 predniSONE 10 mg tablet Commonly known as:  DELTASONE  
3 tabs x 2 days, 2 tabs x 3 days, 1 tab x 4 days, 1/2 tab x 5 days  
  
 warfarin 5 mg tablet Commonly known as:  COUMADIN  
TAKE 2 AND 1/2 TABLETS BY MOUTH DAILY Description Hold coumadin tonight and tomorrow, then resume previous dose February 2017 Details Sun Mon Tue Wed Thu Fri Sat  
     1  
  
  
  
   2  
  
  
  
   3  
  
  
  
   4  
  
  
  
  
  5  
  
  
  
   6  
  
  
  
   7  
  
  
  
   8  
  
  
  
   9  
  
  
  
   10  
  
  
  
   11  
  
  
  
  
  12  
  
  
  
   13  
2.6 Hold See details 15 Hold  
  
   15  
  
12.5 mg  
  
   16  
  
10 mg  
  
   17  
  
12.5 mg  
  
 18  
  
12.5 mg  
  
  
  19  
  
12.5 mg  
  
   20  
  
10 mg  
  
   21  
  
12.5 mg  
  
   22  
  
12.5 mg  
  
   23  
  
10 mg  
  
   24  
  
12.5 mg  
  
   25  
  
12.5 mg  
  
  
  26  
  
12.5 mg  
  
   27  
  
10 mg  
  
   28  
  
12.5 mg Date Details 02/13 This INR check INR: 2.6 How to take your warfarin dose To take:  10 mg Take 1 of the 10 mg tablets. To take:  12.5 mg Take 1 of the 2.5 mg tablets and 1 of the 10 mg tablets. Hold Do not take your warfarin dose. See the Details table to the right for additional instructions. March 2017 Details Teena Lockett Tue Wed Thu Fri Sat  
     1  
  
12.5 mg  
  
   2  
  
10 mg  
  
   3  
  
12.5 mg  
  
   4  
  
12.5 mg  
  
  
  5  
  
12.5 mg  
  
   6  
  
10 mg  
  
   7  
  
  
  
   8  
  
  
  
   9  
  
  
  
   10  
  
  
  
   11  
  
  
  
  
  12  
  
  
  
   13  
  
  
  
   14  
  
  
  
   15  
  
  
  
   16  
  
  
  
   17  
  
  
  
   18  
  
  
  
  
  19  
  
  
  
   20  
  
  
  
   21  
  
  
  
   22  
  
  
  
   23  
  
  
  
   24  
  
  
  
   25  
  
  
  
  
  26  
  
  
  
   27  
  
  
  
   28  
  
  
  
   29  
  
  
  
   30  
  
  
  
   31  
  
  
  
   
 Date Details No additional details Date of next INR:  3/6/2017 How to take your warfarin dose To take:  10 mg Take 1 of the 10 mg tablets. To take:  12.5 mg Take 1 of the 2.5 mg tablets and 1 of the 10 mg tablets. We Performed the Following AMB POC PT/INR [86034 CPT(R)] To-Do List   
 02/21/2017 5:30 PM  
  Appointment with Aly Connelly at [address] [place] [address] ([phone]) Introducing [facility]! [employer] introduces [employer] patient portal. Now you can access parts of your medical record, email your doctor's office, and request medication refills online. 1. In your internet browser, go to [email or web address] 2. Click on the First Time User? Click Here link in the Sign In box. You will see the New Member Sign Up page. 3. Enter your Nearway Access Code exactly as it appears below. You will not need to use this code after youve completed the sign-up process. If you do not sign up before the expiration date, you must request a new code. · Nearway Access Code: [email or web address] Expires: 3/22/2017  9:35 AM 
 
4. Enter the last four digits of your Social Security Number (xxxx) and Date of Birth (mm/dd/yyyy) as indicated and click Submit. You will be taken to the next sign-up page. 5. Create a Nearway ID. This will be your Nearway login ID and cannot be changed, so think of one that is secure and easy to remember. 6. Create a Nearway password. You can change your password at any time. 7. Enter your Password Reset Question and Answer. This can be used at a later time if you forget your password. 8. Enter your e-mail address. You will receive e-mail notification when new information is available in [address] 9. Click Sign Up. You can now view and download portions of your medical record. 10. Click the Download Summary menu link to download a portable copy of your medical information. If you have questions, please visit the Frequently Asked Questions section of the Nearway website. Remember, Nearway is NOT to be used for urgent needs. For medical emergencies, dial 911. Now available from your iPhone and Android! Please provide this summary of care documentation to your next provider. Your primary care clinician is listed as Adelso Rogers. If you have any questions after today's visit, please call [phone].

## 2017-02-15 LAB
BACTERIA SPEC CULT: NORMAL
SERVICE CMNT-IMP: NORMAL

## 2017-02-20 ENCOUNTER — TELEPHONE (OUTPATIENT)
Dept: INTERNAL MEDICINE CLINIC | Age: 62
End: 2017-02-20

## 2017-02-20 NOTE — TELEPHONE ENCOUNTER
Patient is still having sinus pressure and pain. This has been going on for over 3 weeks. He was treated by Halina Melchor and Dr Kevin Raya.   He would like to know if he should be seen by a ENT specialist.  Please give him a call at [phone]

## 2017-02-21 NOTE — TELEPHONE ENCOUNTER
Called and spoke with spouse and informed her that per Dr Wandy Carbajal we could send patient to Dr Celeste Higuera. Understanding was voiced but spouse stated that they already have an appointment with Dr Tana Singh in [facility] to be seen next week. Also requested results of urine culture.  Negative results given and added that after patient's appointment if he would like to end up seeing Dr Celeste Higuera we can still do referral.

## 2017-03-03 ENCOUNTER — TELEPHONE (OUTPATIENT)
Dept: INTERNAL MEDICINE CLINIC | Age: 62
End: 2017-03-03

## 2017-03-03 NOTE — TELEPHONE ENCOUNTER
Patients wife called [facility] for orthopaedic appointment and could not get an appt until the end of this month. She would like to know if we could call over there to get him in sooner or knows of another place to go to .   Please call patient on Monday at [phone]

## 2017-03-06 ENCOUNTER — OFFICE VISIT (OUTPATIENT)
Dept: ORTHOPEDIC SURGERY | Age: 62
End: 2017-03-06

## 2017-03-06 VITALS
TEMPERATURE: 96.7 F | DIASTOLIC BLOOD PRESSURE: 79 MMHG | HEIGHT: 70 IN | BODY MASS INDEX: 31.64 KG/M2 | HEART RATE: 76 BPM | WEIGHT: 221 LBS | SYSTOLIC BLOOD PRESSURE: 138 MMHG

## 2017-03-06 DIAGNOSIS — M25.512 ACUTE PAIN OF LEFT SHOULDER: Primary | ICD-10-CM

## 2017-03-06 RX ORDER — WARFARIN SODIUM 5 MG/1
TABLET ORAL
Qty: 75 TAB | Refills: 5 | Status: SHIPPED | OUTPATIENT
Start: 2017-03-06 | End: 2017-09-12 | Stop reason: SDUPTHER

## 2017-03-06 NOTE — PROGRESS NOTES
Patient: Slime Mcgill                MRN: [id number]       SSN: [id number]  YOB: 1955        AGE: 59 y.o. SEX: male  There is no height or weight on file to calculate BMI. PCP: Heather Harding MD  03/06/17      No chief complaint on file. HISTORY OF PRESENT ILLNESS: Slime Mcgill is a 59 y.o. male who presents to the office for 3 week hx of left shoulder pain. The pain started when he reached up for his shoulder harness and heard a pop. Since that time he has had continued pain, limitation motion and not being able to sleep at night. He is right hand dominant. He has had no numbness or paresthesia in the RUE. Review of Systems   Constitutional: Negative. HENT: Negative. Eyes: Negative. Respiratory: Negative. Cardiovascular: Negative. Gastrointestinal: Negative. Genitourinary: Negative. Musculoskeletal: Positive for myalgias (arm pain). Skin: Negative. Neurological: Negative. Endo/Heme/Allergies: Negative. Psychiatric/Behavioral: Negative. Social History     Social History    Marital status:      Spouse name: N/A    Number of children: N/A    Years of education: N/A     Occupational History    Not on file.      Social History Main Topics    Smoking status: Never Smoker    Smokeless tobacco: Never Used    Alcohol use No    Drug use: No    Sexual activity: Not Currently     Other Topics Concern    Not on file     Social History Narrative        Past Medical History:   Diagnosis Date    Arthritis     Bleeding     Blood clot in the legs     Esophageal reflux     Headache(784.0)     migraine    High cholesterol     Hypertension     Lower back pain 11/6/2010    Other chest pain     Personal history of venous thrombosis and embolism     Pure hypercholesterolemia     Right buttock pain 11/6/2010    Right foot pain     Spinal stenosis     Tendonitis, tibialis     anterior    Thromboembolus ([facility] [address] Allergies   Allergen Reactions    Augmentin [Amoxicillin-Pot Clavulanate] Itching    Penicillins Rash         Current Outpatient Prescriptions   Medication Sig    lisinopril-hydroCHLOROthiazide (PRINZIDE, ZESTORETIC) 20-12.5 mg per tablet TAKE 1 TABLET BY MOUTH DAILY    lisinopril-hydroCHLOROthiazide (PRINZIDE, ZESTORETIC) 20-12.5 mg per tablet TAKE 1 TABLET BY MOUTH DAILY    montelukast (SINGULAIR) 10 mg tablet TAKE 1 TABLET BY MOUTH EVERY DAY    fluticasone (FLONASE) 50 mcg/actuation nasal spray 2 spray each nostril daily    azithromycin (ZITHROMAX) 250 mg tablet Take 2 tablets today, then take 1 tablet daily    HYDROcodone-acetaminophen (NORCO) 7.5-325 mg per tablet 1 po bid prn severe pain    warfarin (COUMADIN) 5 mg tablet TAKE 2 AND 1/2 TABLETS BY MOUTH DAILY    acetaminophen (TYLENOL) 500 mg tablet Take  by mouth every six (6) hours as needed for Pain.  predniSONE (DELTASONE) 10 mg tablet 3 tabs x 2 days, 2 tabs x 3 days, 1 tab x 4 days, 1/2 tab x 5 days    lansoprazole (PREVACID) 30 mg capsule Take 1 Cap by mouth Daily (before breakfast).  Butalbital-Acetaminophen-Caff -40 mg cap Take 2 capsules every 8 hours as needed for headache    labetalol (NORMODYNE) 100 mg tablet TAKE 1 TABLET BY MOUTH TWICE DAILY.  metaxalone (SKELAXIN) 800 mg tablet Take  by mouth. No current facility-administered medications for this visit. Physical Exam  Constitutional: he is oriented to person, place, and time and well-developed, well-nourished, and in no distress. No distress. HENT:   Head: Normocephalic and atraumatic. Right Ear: Hearing normal.   Left Ear: Hearing normal.   Nose: Nose normal.   Eyes: Conjunctivae, EOM and lids are normal. Pupils are equal, round, and reactive to light. Neck: Trachea normal.   Pulmonary/Chest: Effort normal and breath sounds normal. No respiratory distress. Abdominal: Soft. Neurological: he is alert and oriented to person, place, and time. Skin: Skin is warm, dry and intact. he is not diaphoretic. Psychiatric: Affect normal.   Nursing note and vitals reviewed. Ortho Exam   Shoulder flexion to 90 degrees, abduction to 60 degrees, external rotation to 10 degrees and internal rotation to 50% of normal all caused pain. Palpation about the shoulder revealed tenderness but no increased warmth. With his arm at his side, flexion and extension against resistance did not cause pain. RADIOGRAPHS:   3 views of the shoulder no bone spurs, calcium deposits or shoulder arthritis. IMPRESSION & PLAN: With the hx and exam, I feel the pt has a rotator cuff tear. Will proceed with an MRI. I will see him back after the results of the MRI.       Scribed by Evelia Lentz ([facility]) as dictated by Bryant Ruiz MD.

## 2017-03-06 NOTE — TELEPHONE ENCOUNTER
Requested Prescriptions     Pending Prescriptions Disp Refills    warfarin (COUMADIN) 5 mg tablet 75 Tab 0     Patient is out

## 2017-03-07 ENCOUNTER — OFFICE VISIT (OUTPATIENT)
Dept: ORTHOPEDIC SURGERY | Age: 62
End: 2017-03-07

## 2017-03-07 VITALS
HEART RATE: 78 BPM | HEIGHT: 70 IN | TEMPERATURE: 98 F | WEIGHT: 221 LBS | DIASTOLIC BLOOD PRESSURE: 69 MMHG | RESPIRATION RATE: 18 BRPM | SYSTOLIC BLOOD PRESSURE: 126 MMHG | OXYGEN SATURATION: 98 % | BODY MASS INDEX: 31.64 KG/M2

## 2017-03-07 DIAGNOSIS — M48.061 LUMBAR SPINAL STENOSIS: ICD-10-CM

## 2017-03-07 DIAGNOSIS — Z79.01 WARFARIN ANTICOAGULATION: ICD-10-CM

## 2017-03-07 DIAGNOSIS — M51.36 DEGENERATION OF LUMBAR INTERVERTEBRAL DISC: ICD-10-CM

## 2017-03-07 DIAGNOSIS — M47.816 FACET ARTHROPATHY, LUMBAR: Primary | ICD-10-CM

## 2017-03-07 DIAGNOSIS — M62.830 MUSCLE SPASM OF BACK: ICD-10-CM

## 2017-03-07 DIAGNOSIS — R60.0 BILATERAL EDEMA OF LOWER EXTREMITY: ICD-10-CM

## 2017-03-07 DIAGNOSIS — M47.816 SPONDYLOSIS OF LUMBAR REGION WITHOUT MYELOPATHY OR RADICULOPATHY: ICD-10-CM

## 2017-03-07 RX ORDER — LISINOPRIL AND HYDROCHLOROTHIAZIDE 12.5; 2 MG/1; MG/1
TABLET ORAL
Qty: 30 TAB | Refills: 5 | Status: SHIPPED | OUTPATIENT
Start: 2017-03-07 | End: 2017-04-07 | Stop reason: SDUPTHER

## 2017-03-07 RX ORDER — METAXALONE 800 MG/1
800 TABLET ORAL 3 TIMES DAILY
Qty: 180 TAB | Refills: 1 | Status: SHIPPED | OUTPATIENT
Start: 2017-03-07 | End: 2017-04-13 | Stop reason: ALTCHOICE

## 2017-03-07 NOTE — PATIENT INSTRUCTIONS
Low Back Arthritis: Exercises  Your Care Instructions  Here are some examples of typical rehabilitation exercises for your condition. Start each exercise slowly. Ease off the exercise if you start to have pain. Your doctor or physical therapist will tell you when you can start these exercises and which ones will work best for you. When you are not being active, find a comfortable position for rest. Some people are comfortable on the floor or a medium-firm bed with a small pillow under their head and another under their knees. Some people prefer to lie on their side with a pillow between their knees. Don't stay in one position for too long. Take short walks (10 to 20 minutes) every 2 to 3 hours. Avoid slopes, hills, and stairs until you feel better. Walk only distances you can manage without pain, especially leg pain. How to do the exercises  Pelvic tilt    1. Lie on your back with your knees bent. 2. \"Brace\" your stomach--tighten your muscles by pulling in and imagining your belly button moving toward your spine. 3. Press your lower back into the floor. You should feel your hips and pelvis rock back. 4. Hold for 6 seconds while breathing smoothly. 5. Relax and allow your pelvis and hips to rock forward. 6. Repeat 8 to 12 times. Back stretches    1. Get down on your hands and knees on the floor. 2. Relax your head and allow it to droop. Round your back up toward the ceiling until you feel a nice stretch in your upper, middle, and lower back. Hold this stretch for as long as it feels comfortable, or about 15 to 30 seconds. 3. Return to the starting position with a flat back while you are on your hands and knees. 4. Let your back sway by pressing your stomach toward the floor. Lift your buttocks toward the ceiling. 5. Hold this position for 15 to 30 seconds. 6. Repeat 2 to 4 times. Follow-up care is a key part of your treatment and safety.  Be sure to make and go to all appointments, and call your doctor if you are having problems. It's also a good idea to know your test results and keep a list of the medicines you take. Where can you learn more? Go to http://rivka-jarrell.info/. Enter L725 in the search box to learn more about \"Low Back Arthritis: Exercises. \"  Current as of: May 23, 2016  Content Version: 11.1  © 6865-3998 [employer]. Care instructions adapted under license by [employer] (which disclaims liability or warranty for this information). If you have questions about a medical condition or this instruction, always ask your healthcare professional. [address] any warranty or liability for your use of this information.

## 2017-03-07 NOTE — MR AVS SNAPSHOT
Visit Information Date & Time Provider Department Dept. Phone Encounter #  
 3/7/2017  3:30 PM Eloisa Darby MD [facility] [phone] Follow-up Instructions Return in about 3 months (around 6/7/2017) for Medication follow up. Routing History Your Appointments 3/14/2017  7:35 AM  
DIAG TEST F/U with Lindsey Ivory MD  
[facility], [address] [facility] [address]) Appt Note: f/u MRI done 3/9, adv HS office [address] [facility], [address] [address]  
[phone]  
  
   
 [address] [facility], [address] [address]  
  
    
 3/23/2017  4:15 PM  
Follow Up with Jose Armando Darden MD  
[facility] [place] [address]) Appt Note: 4 mos [address] [facility], [address]  
  
   
 [address] [facility], [address] [facility] [address] Upcoming Health Maintenance Date Due Hepatitis C Screening 1955 DTaP/Tdap/Td series (1 - Tdap) 4/13/1974 ZOSTER VACCINE AGE 60> 4/13/2013 INFLUENZA AGE 9 TO ADULT 8/1/2016 COLONOSCOPY 5/27/2026 Allergies as of 3/7/2017  Review Complete On: 3/7/2017 By: Eloisa Darby MD  
  
 Severity Noted Reaction Type Reactions Augmentin [Amoxicillin-pot Clavulanate]    Itching Penicillins    Rash Current Immunizations  Never Reviewed No immunizations on file. Not reviewed this visit You Were Diagnosed With   
  
 Codes Comments Facet arthropathy, lumbar ([facility] [address]    -  Primary ICD-10-CM: M12.88 ICD-9-CM: 721.3 Lumbar spinal stenosis     ICD-10-CM: M48.06 
ICD-9-CM: 724.02 Warfarin anticoagulation     ICD-10-CM: Z79.01 
ICD-9-CM: V58.61 Bilateral edema of lower extremity     ICD-10-CM: R60.0 ICD-9-CM: 782.3 Muscle spasm of back     ICD-10-CM: T26.102 ICD-9-CM: 724.8 Vitals BP Pulse Temp Resp Height(growth percentile) Weight(growth percentile) 126/69 78 98 °F (36.7 °C) (Oral) 18 5' 10\" (1.778 m) 221 lb (100.2 kg) SpO2 BMI Smoking Status 98% 31.71 kg/m2 Never Smoker BMI and BSA Data Body Mass Index Body Surface Area 31.71 kg/m 2 2.22 m 2 Preferred Pharmacy Pharmacy Name Phone [facility] Jameson Wilkinson [address] [place] [address] [phone] Your Updated Medication List  
  
   
This list is accurate as of: 3/7/17  4:53 PM.  Always use your most recent med list.  
  
  
  
  
 acetaminophen 500 mg tablet Commonly known as:  TYLENOL Take  by mouth every six (6) hours as needed for Pain. azithromycin 250 mg tablet Commonly known as:  Hedwig Pine Take 2 tablets today, then take 1 tablet daily  
  
 butalbital-acetaminophen-caff -40 mg per capsule Commonly known as:  [employer] Take 2 capsules every 8 hours as needed for headache  
  
 fluticasone 50 mcg/actuation nasal spray Commonly known as:  FLONASE  
2 spray each nostril daily HYDROcodone-acetaminophen 7.5-325 mg per tablet Commonly known as:  Benetta Pock 1 po bid prn severe pain  
  
 labetalol 100 mg tablet Commonly known as:  NORMODYNE  
TAKE 1 TABLET BY MOUTH TWICE DAILY. lansoprazole 30 mg capsule Commonly known as:  PREVACID Take 1 Cap by mouth Daily (before breakfast). * lisinopril-hydroCHLOROthiazide 20-12.5 mg per tablet Commonly known as:  PRINZIDE, ZESTORETIC  
TAKE 1 TABLET BY MOUTH DAILY * lisinopril-hydroCHLOROthiazide 20-12.5 mg per tablet Commonly known as:  PRINZIDE, ZESTORETIC  
TAKE 1 TABLET BY MOUTH DAILY  
  
 metaxalone 800 mg tablet Commonly known as:  SKELAXIN Take 1 Tab by mouth three (3) times daily. Take 1 po bid - tid prn muscle spasm  Indications: MUSCLE SPASM  
  
 montelukast 10 mg tablet Commonly known as:  SINGULAIR  
 TAKE 1 TABLET BY MOUTH EVERY DAY  
  
 predniSONE 10 mg tablet Commonly known as:  DELTASONE  
3 tabs x 2 days, 2 tabs x 3 days, 1 tab x 4 days, 1/2 tab x 5 days  
  
 warfarin 5 mg tablet Commonly known as:  COUMADIN  
TAKE 2 AND 1/2 TABLETS BY MOUTH DAILY OR AS DIRECTED * Notice: This list has 2 medication(s) that are the same as other medications prescribed for you. Read the directions carefully, and ask your doctor or other care provider to review them with you. Prescriptions Printed Refills  
 metaxalone (SKELAXIN) 800 mg tablet 1 Sig: Take 1 Tab by mouth three (3) times daily. Take 1 po bid - tid prn muscle spasm  Indications: MUSCLE SPASM Class: Print Route: Oral  
  
We Performed the Following AMB SUPPLY ORDER [[id number] Custom] Comments: DME for 2 pairs of knee hi compression LORETO Hose Follow-up Instructions Return in about 3 months (around 6/7/2017) for Medication follow up. To-Do List   
 03/09/2017 6:30 PM  
  Appointment with [facility] MRI RM 1 at [address] [place] [address] ([phone]) GENERAL INSTRUCTIONS  Bring information (ID card) if you have any medically implanted devices. You will be required to lie still while the procedure is being performed. Remove any jewelry (including body piercing, hairpins) prior to MRI. If you have had a creatinine level drawn within the past 30 days, please bring most recent results to your appt. Bring any films, CD's, and reports related to your study with you on the day of your exam.  This only includes studies done outside of [facility], [facility] [place] [facility], [facility] , and [facility]. Bring a complete list of all medications you are currently taking to include prescriptions, over-the-counter meds, herbals, vitamins & any dietary supplements. If you were given medications for claustrophobia or anxiety, please arrange to have someone drive you to your appointment.   QUESTIONS Notify the MRI Department if you have any questions concerning your study. [place] - [phone] [address] - [phone] Patient Instructions Low Back Arthritis: Exercises Your Care Instructions Here are some examples of typical rehabilitation exercises for your condition. Start each exercise slowly. Ease off the exercise if you start to have pain. Your doctor or physical therapist will tell you when you can start these exercises and which ones will work best for you. When you are not being active, find a comfortable position for rest. Some people are comfortable on the floor or a medium-firm bed with a small pillow under their head and another under their knees. Some people prefer to lie on their side with a pillow between their knees. Don't stay in one position for too long. Take short walks (10 to 20 minutes) every 2 to 3 hours. Avoid slopes, hills, and stairs until you feel better. Walk only distances you can manage without pain, especially leg pain. How to do the exercises Pelvic tilt 1. Lie on your back with your knees bent. 2. \"Brace\" your stomachtighten your muscles by pulling in and imagining your belly button moving toward your spine. 3. Press your lower back into the floor. You should feel your hips and pelvis rock back. 4. Hold for 6 seconds while breathing smoothly. 5. Relax and allow your pelvis and hips to rock forward. 6. Repeat 8 to 12 times. Back stretches 1. Get down on your hands and knees on the floor. 2. Relax your head and allow it to droop. Round your back up toward the ceiling until you feel a nice stretch in your upper, middle, and lower back. Hold this stretch for as long as it feels comfortable, or about 15 to 30 seconds. 3. Return to the starting position with a flat back while you are on your hands and knees. 4. Let your back sway by pressing your stomach toward the floor.  Lift your buttocks toward the ceiling. 5. Hold this position for 15 to 30 seconds. 6. Repeat 2 to 4 times. Follow-up care is a key part of your treatment and safety. Be sure to make and go to all appointments, and call your doctor if you are having problems. It's also a good idea to know your test results and keep a list of the medicines you take. Where can you learn more? Go to http://rivka-jarrell.info/. Enter V168 in the search box to learn more about \"Low Back Arthritis: Exercises. \" Current as of: May 23, 2016 Content Version: 11.1 © 9824-6257 [employer]. Care instructions adapted under license by [employer] (which disclaims liability or warranty for this information). If you have questions about a medical condition or this instruction, always ask your healthcare professional. [address] any warranty or liability for your use of this information. Introducing [facility]! Surjit Booker introduces ShoeSize.Me patient portal. Now you can access parts of your medical record, email your doctor's office, and request medication refills online. 1. In your internet browser, go to [email or web address] 2. Click on the First Time User? Click Here link in the Sign In box. You will see the New Member Sign Up page. 3. Enter your [email or web address] Access Code exactly as it appears below. You will not need to use this code after youve completed the sign-up process. If you do not sign up before the expiration date, you must request a new code. · [email or web address] Access Code: [email or web address] Expires: 3/22/2017  9:35 AM 
 
4. Enter the last four digits of your Social Security Number (xxxx) and Date of Birth (mm/dd/yyyy) as indicated and click Submit. You will be taken to the next sign-up page. 5. Create a [email or web address] ID. This will be your [email or web address] login ID and cannot be changed, so think of one that is secure and easy to remember. 6. Create a [place] password. You can change your password at any time. 7. Enter your Password Reset Question and Answer. This can be used at a later time if you forget your password. 8. Enter your e-mail address. You will receive e-mail notification when new information is available in [address] 9. Click Sign Up. You can now view and download portions of your medical record. 10. Click the Download Summary menu link to download a portable copy of your medical information. If you have questions, please visit the Frequently Asked Questions section of the [employer] website. Remember, [employer] [employer] is NOT to be used for urgent needs. For medical emergencies, dial 911. Now available from your iPhone and Android! Please provide this summary of care documentation to your next provider. Your primary care clinician is listed as Manfred Spivey. If you have any questions after today's visit, please call [phone].

## 2017-03-07 NOTE — PROGRESS NOTES
[facility]  [address] [place] [address], [facility] [address]  Phone: [phone]  Fax: [phone]          HISTORY OF PRESENT ILLNESS:  Marisol Olivares is a 59 y.o. male with history of cervical and lumbar pain. Pt reports 0-1/10 lower back pain. He states that he followed up with Dr. Dennys Carrera yesterday, had an X-ray of the left shoulder, and was ordered a left shoulder MRI. Pt reports that he tore his right biceps tendon on the right side about 16 years ago. He also experiences pain in the right knee and will try a steroid injection at his next office visit with Dr. Franc Healy. Pt reports that he has been sleeping on a recliner for the last 3 weeks due to left shoulder pain. He admits to weakness in the left arm but denies dropping objects. Pt states that he took Skelaxin 800 mg shortly after his last office visit but reports that he has not recently taken the medication. He has been prescribed Norco and Percocet but denies any significant relief in his pain when taking either medication. Pt reports that he has been taking Tylenol Arthritis as needed. He requests a prescription for compression stockings at this time. Pt at this time desires to continue with current care.     Pain Scale: 1/10    PCP: Wesley Macias MD       Past Medical History:   Diagnosis Date    Arthritis     Bleeding     Blood clot in the legs     Esophageal reflux     Headache(784.0)     migraine    High cholesterol     Hypertension     Lower back pain 11/6/2010    Other chest pain     Personal history of venous thrombosis and embolism     Pure hypercholesterolemia     Right buttock pain 11/6/2010    Right foot pain     Spinal stenosis     Tendonitis, tibialis     anterior    Thromboembolus ([address] [address]         Social History     Social History    Marital status:      Spouse name: N/A    Number of children: N/A    Years of education: N/A     Occupational History    Not on file.     Social History Main Topics    Smoking status: Never Smoker    Smokeless tobacco: Never Used    Alcohol use No    Drug use: No    Sexual activity: Not Currently     Other Topics Concern    Not on file     Social History Narrative       Current Outpatient Prescriptions   Medication Sig Dispense Refill    metaxalone (SKELAXIN) 800 mg tablet Take 1 Tab by mouth three (3) times daily. Take 1 po bid - tid prn muscle spasm  Indications: MUSCLE SPASM 180 Tab 1    warfarin (COUMADIN) 5 mg tablet TAKE 2 AND 1/2 TABLETS BY MOUTH DAILY OR AS DIRECTED 75 Tab 5    lisinopril-hydroCHLOROthiazide (PRINZIDE, ZESTORETIC) 20-12.5 mg per tablet TAKE 1 TABLET BY MOUTH DAILY 30 Tab 0    montelukast (SINGULAIR) 10 mg tablet TAKE 1 TABLET BY MOUTH EVERY DAY 90 Tab 0    fluticasone (FLONASE) 50 mcg/actuation nasal spray 2 spray each nostril daily 1 Bottle 1    acetaminophen (TYLENOL) 500 mg tablet Take  by mouth every six (6) hours as needed for Pain.  lansoprazole (PREVACID) 30 mg capsule Take 1 Cap by mouth Daily (before breakfast). 90 Cap 3    Butalbital-Acetaminophen-Caff -40 mg cap Take 2 capsules every 8 hours as needed for headache 540 Cap 3    labetalol (NORMODYNE) 100 mg tablet TAKE 1 TABLET BY MOUTH TWICE DAILY. 180 Tab 4    lisinopril-hydroCHLOROthiazide (PRINZIDE, ZESTORETIC) 20-12.5 mg per tablet TAKE 1 TABLET BY MOUTH DAILY 30 Tab 5    azithromycin (ZITHROMAX) 250 mg tablet Take 2 tablets today, then take 1 tablet daily 6 Tab 0    HYDROcodone-acetaminophen (NORCO) 7.5-325 mg per tablet 1 po bid prn severe pain 60 Tab 0    predniSONE (DELTASONE) 10 mg tablet 3 tabs x 2 days, 2 tabs x 3 days, 1 tab x 4 days, 1/2 tab x 5 days 20 Tab 0       Allergies   Allergen Reactions    Augmentin [Amoxicillin-Pot Clavulanate] Itching    Penicillins Rash         REVIEW OF SYSTEMS    Constitutional: Negative for fever, chills, or weight change. Respiratory: Negative for cough or shortness of breath. Cardiovascular: Negative for chest pain or palpitations. Gastrointestinal: Negative for acid reflux, change in bowel habits, or constipation. Genitourinary: Negative for dysuria and flank pain. Musculoskeletal: Positive for cervical pain. Skin: Negative for rash. Neurological: Negative for headaches, dizziness, or numbness. Endo/Heme/Allergies: Negative for increased bruising. Psychiatric/Behavioral: Negative for difficulty with sleep. PHYSICAL EXAMINATION  Visit Vitals    /69    Pulse 78    Temp 98 °F (36.7 °C) (Oral)    Resp 18    Ht 5' 10\" (1.778 m)    Wt 221 lb (100.2 kg)    SpO2 98%    BMI 31.71 kg/m2     Constitutional: Awake, alert, and in no acute distress  Neurological: 1+ symmetrical DTRs in the upper extremities. 1+ symmetrical DTRs in the lower extremities. Sensation to light touch is intact. Negative Eliecer's sign bilaterally. Skin: warm, dry, and intact. Musculoskeletal: Moderately tight across the upper trapezius. Positive Hawkin's and Neer's test on the left. Negative empty can test bilaterally. No tenderness to palpation in the lower lumbar region. No pain with extension, axial loading, or forward flexion. No pain with internal or external rotation of his hips. Negative straight leg raise bilaterally. Biceps  Triceps Deltoids Wrist Ext Wrist Flex Hand Intrin   Right +4/5 +4/5 +4/5 +4/5 +4/5 +4/5   Left +4/5 +4/5 +4/5 +4/5 +4/5 +4/5     IMAGING:    Cervical Spine MRI from 12/19/2016 was personally reviewed with the Pt and demonstrated:    Results from [place] encounter on 12/19/16   MRI CERV SPINE WO CONT   Narrative MRI of cervical spine without contrast    HISTORY: Chronic progressive neck pain with headaches. COMPARISON: No prior MRI. Cervical spine radiographs from 12/1/2016. TECHNIQUE: T1 weighted, T2 FSE, FSE inversion recovery sagittal images are  supplemented by T2 weighted and GRE/medic axial images.     FINDINGS: Normal alignment and vertebral body heights. Normal marrow signal  except for mild endplate degenerative change. Cervical cord is normal in signal  and caliber. Visualized posterior fossa contents look normal. Paraspinal soft  tissues look normal.    C2-3: No significant degenerative disc disease or spinal stenosis. C3-4: Small nonfocal disc protrusion which contacts the ventral cord and causes  borderline spinal stenosis. No foraminal stenosis. C4-5: No significant degenerative disc disease. Mildly hypertrophic facets. No  spinal stenosis. C5-6: Disc is narrowed with diffusely bulging disc and osteophyte complex. Facets are mildly hypertrophic. Mild spinal canal and moderate left foraminal  stenoses. C6-7: Disc is narrowed with diffusely bulging disc and osteophyte complex. Facets are mildly hypertrophic. Mild spinal canal and moderate bilateral  foraminal stenoses. C7-T1: No significant degenerative disc disease or spinal stenosis. Impression Impression:    Disc osteophyte complexes causing mild spinal canal stenoses at C5-6 and C6-7. Moderate left foraminal stenosis at C5-6 and moderate bilateral foraminal  stenoses at C6-7. Final result (Exam End: 1/23/2013  6:57 PM) Open    Study Result   MRI lumbar spine with and without contrast     Comparison: August 1, 2011     Clinical information: History of prior back surgery, now with right-sided pain.     Procedure:     MRI of the lumbar spine was performed prior to and following the uneventful  administration of 20 cc of gadolinium intravenously. Sequences included:  Sagittal T1, sagittal T1 postcontrast, sagittal inversion recovery, sagittal  T2, axial T1, axial T1 postcontrast, and axial T2 with fat saturation.     Findings:     Vertebral body heights are maintained. There are mild degenerative endplate  changes at E1-O2. No evidence for fracture.  Alignment is anatomic, without  listhesis.     There is mild disc narrowing at L4-5 and moderate disc narrowing at L5-S1 with  desiccation.     The conus terminates at the L1 level.     Susceptibility artifact present in the soft tissues of the lower back  consistent with prior surgical intervention.     Correlation of axial and sagittal images reveals the following:     At L1-L2: No significant disc pathology or proliferative changes. No central  canal or foraminal stenosis.     At L2-L3: No significant disc pathology or proliferative changes. No central  canal or foraminal stenosis.     At L3-L4: Mild bulging of the posterolateral disc corners. Mild facet  arthropathy and ligamentous buckling. The canal is patent. Mild right greater  than left foraminal narrowing. Minimal progression.     At L4-L5: Prior left hemilaminectomy. Mild circumferential disc bulge,  eccentric along the posterior right disc margin. Mild facet arthropathy. Moderate right ligamentous hypertrophy. There is mild narrowing of the lateral  recesses. Mild central canal narrowing. Moderate right and mild left foraminal  narrowing. Findings look overall unchanged.     At L5-S1: Prior right hemilaminectomies. Mild posterior disc bulge/osteophyte  complex. Moderate facet arthropathy. Canal is patent. Moderate bilateral  foraminal narrowing. Findings look stable.     Visualized portions of the sacroiliac joints are unremarkable. Incidentally  imaged retroperitoneal structures are unremarkable as well.           IMPRESSION:  Postsurgical change at L4-5 and L5-S1. Central canal narrowing at L4-5 and  foraminal stenosis at L4-5/L5-S1 appear stable when compared to prior study.     Minimal progression of mild degenerative changes at L3-4.            ASSESSMENT   Blas Bennett was seen today for back pain. Diagnoses and all orders for this visit:    Facet arthropathy, lumbar ([address]  -     metaxalone (SKELAXIN) 800 mg tablet; Take 1 Tab by mouth three (3) times daily. Take 1 po bid - tid prn muscle spasm  Indications:  MUSCLE SPASM    Lumbar spinal stenosis  - metaxalone (SKELAXIN) 800 mg tablet; Take 1 Tab by mouth three (3) times daily. Take 1 po bid - tid prn muscle spasm  Indications: MUSCLE SPASM    Warfarin anticoagulation    Bilateral edema of lower extremity  -     Generic Supply Order    Muscle spasm of back  -     metaxalone (SKELAXIN) 800 mg tablet; Take 1 Tab by mouth three (3) times daily. Take 1 po bid - tid prn muscle spasm  Indications: MUSCLE SPASM       IMPRESSION AND PLAN:  Cherie Stephen is a 59 y.o. male with history of lumbar pain. He also has a history of cervical pain and recently has been seen by orthopedics for shoulder pain. Pt reports 0-1/10 lower back pain. Pt also experiences pain in the right knee and will try a steroid injection at his next office visit with Dr. Luanne Juan. He has been prescribed Skelaxin 800 mg through the [address] [place] [address] and reports that he will need a refill today. 1) Pt was given information on lumbar arthritis exercises. 2) He received a refill of Skelaxin 800 mg 1 tab BID-TID prn muscle spasm. 3) Pt was prescribed compression stockings. 4) Mr. Maykel Womack has a reminder for a \"due or due soon\" health maintenance. I have asked that he contact his primary care provider, Rusty Cordova MD, for follow-up on this health maintenance. 5)  reviewed. 6) Last UDS from 01/24/2017 was consistent. 7) Pt is not a candidate for NSAIDs due to chronic use of coumadin  8) Pt will follow-up in 3 months.       Written by Kobe Cade, as dictated by Alyssa Torres MD.

## 2017-03-07 NOTE — TELEPHONE ENCOUNTER
Called and spoke with spouse as a note was in the system that patient was actually seen yesterday. Spouse states that she had called back yesterday to leave a message to disregard, that she had been able to get patient in with [facility] yesterday and he was seen.

## 2017-03-09 ENCOUNTER — HOSPITAL ENCOUNTER (OUTPATIENT)
Age: 62
Discharge: HOME | End: 2017-03-09
Attending: ORTHOPAEDIC SURGERY
Payer: COMMERCIAL

## 2017-03-09 DIAGNOSIS — M25.512 ACUTE PAIN OF LEFT SHOULDER: ICD-10-CM

## 2017-03-09 PROCEDURE — 73221 MRI JOINT UPR EXTREM W/O DYE: CPT

## 2017-03-09 NOTE — PROGRESS NOTES
[facility] COMPANY OF  [facility]  [address] [place] [address]  [phone] fax     Physical Therapy Discharge Summary     Patient name: Dexter Kessler Start of Care: 16   Referral source: Jaquelin Phan MD : 1955   Medical/Treatment Diagnosis: Other cervical disc degeneration, unspecified cervical region [M50.30]  Other specific arthropathies, not elsewhere classified, other specified site [M12.88]  Other muscle spasm [M62.838]  Other headache syndrome [G44.89] Onset Date:2 years ago   Prior Hospitalization: see medical history Provider#: [id number]   Medications: Verified on Patient Summary List     Comorbidities: Arthritis, HTN  Prior Level of Function: Ind with ADLs and work related tasks with less symptoms, 2-5 headaches per week, Going to [employer]     Visits from [place] of Care: 11    Missed Visits: 0     Reporting Period : 16 to 17     Summary of Care:  Goal: Pt will improve FOTO score by 6 points to demonstrate improved functional abilities. Status at last note/certification: Scored 56 on initial evaluation  Status at discharge: not met (Scored 57 points on last visit)     Goal: Pt will report <4/10 pain during daily activities to improve QOL. Status at last note/certification: 4- depending on day  Status at discharge: not met (Continued to have fluctuating pain levels)     Goal: Pt will report 2 headaches per week for 2 weeks to demonstrate improved symptoms. Status at last note/certification: Daily headaches  Status at discharge: not met (Reports daily headaches with reduced intensity and severity)     Goal: Pt will improve cervical ROM to 75% in each direction to increase ease with driving.   Status at last note/certification: 46 deg L rotation (painful at end ROM), 55 deg R rotation  Status at discharge: not met     Goal: Pt will demonstrate proper posturing during standing and sitting activities in thoracic and cervical spine to increase ease with work and [employer] activities. Status at last note/certification: Forward head and protracted shoulders for seated and standing posture  Status at discharge: not met     ASSESSMENT/RECOMMENDATIONS:  Pt is being discharged from physical therapy at this time due to violation of cancellation/no show policy. Pt was making minimal benefits with therapy at last treatment session. Pt did not meet any of his final goals due to not attending further treatment sessions but received HEP with exercises and information regarding posture throughout treatment sessions.  Pt is now discharged.      [x]Discontinue therapy:    []Patient has reached or is progressing toward set goals    [x]Patient is non-compliant or has abdicated    []Due to lack of appreciable progress towards set goals     Smooth Mckenzie 3/9/2017 2:13 PM

## 2017-03-09 NOTE — PROGRESS NOTES
[facility] [place]  [address] [facility]  [phone] fax    Physical Therapy Discharge Summary    Patient name: Hung Gorman Start of Care: 16   Referral source: Lillian Taylor MD : 1955   Medical/Treatment Diagnosis: Other cervical disc degeneration, unspecified cervical region [M50.30]  Other specific arthropathies, not elsewhere classified, other specified site [M12.88]  Other muscle spasm [M62.838]  Other headache syndrome [G44.89] Onset Date:2 years ago     Prior Hospitalization: see medical history Provider#: [id number]   Medications: Verified on Patient Summary List    Comorbidities: Arthritis, HTN  Prior Level of Function: Ind with ADLs and work related tasks with less symptoms, 2-5 headaches per week, Going to [facility]    Visits from [place] of Care: 11    Missed Visits: 0    Reporting Period : 16 to 17    Summary of Care:  Goal: Pt will improve FOTO score by 6 points to demonstrate improved functional abilities. Status at last note/certification: Scored 56 on initial evaluation  Status at discharge: not met (Scored 57 points on last visit)    Goal: Pt will report <4/10 pain during daily activities to improve QOL. Status at last note/certification: 8- depending on day  Status at discharge: not met (Continued to have fluctuating pain levels)    Goal: Pt will report 2 headaches per week for 2 weeks to demonstrate improved symptoms. Status at last note/certification: Daily headaches  Status at discharge: not met (Reports daily headaches with reduced intensity and severity)    Goal: Pt will improve cervical ROM to 75% in each direction to increase ease with driving.   Status at last note/certification: 46 deg L rotation (painful at end ROM), 55 deg R rotation  Status at discharge: not met    Goal: Pt will demonstrate proper posturing during standing and sitting activities in thoracic and cervical spine to increase ease with work and [employer] activities. Status at last note/certification: Forward head and protracted shoulders for seated and standing posture  Status at discharge: not met    ASSESSMENT/RECOMMENDATIONS:  Pt is being discharged from physical therapy at this time due to violation of cancellation/no show policy. Pt was making minimal benefits with therapy at last treatment session. Pt did not meet any of his final goals due to not attending further treatment sessions but received HEP with exercises and information regarding posture throughout treatment sessions. Pt is now discharged.      [x]Discontinue therapy: []Patient has reached or is progressing toward set goals      [x]Patient is non-compliant or has abdicated      []Due to lack of appreciable progress towards set goals    Teresa Mckenzie 3/9/2017 2:13 PM

## 2017-03-14 ENCOUNTER — OFFICE VISIT (OUTPATIENT)
Dept: ORTHOPEDIC SURGERY | Facility: CLINIC | Age: 62
End: 2017-03-14

## 2017-03-14 ENCOUNTER — ANTI-COAG VISIT (OUTPATIENT)
Dept: INTERNAL MEDICINE CLINIC | Age: 62
End: 2017-03-14

## 2017-03-14 VITALS
WEIGHT: 221 LBS | SYSTOLIC BLOOD PRESSURE: 141 MMHG | HEART RATE: 74 BPM | HEIGHT: 70 IN | BODY MASS INDEX: 31.64 KG/M2 | TEMPERATURE: 96 F | DIASTOLIC BLOOD PRESSURE: 81 MMHG

## 2017-03-14 DIAGNOSIS — Z86.718 PERSONAL HISTORY OF VENOUS THROMBOSIS AND EMBOLISM: ICD-10-CM

## 2017-03-14 DIAGNOSIS — Z79.01 WARFARIN ANTICOAGULATION: ICD-10-CM

## 2017-03-14 DIAGNOSIS — M25.562 CHRONIC PAIN OF BOTH KNEES: Primary | ICD-10-CM

## 2017-03-14 DIAGNOSIS — M17.11 ARTHRITIS OF KNEE, RIGHT: ICD-10-CM

## 2017-03-14 DIAGNOSIS — M25.561 CHRONIC PAIN OF BOTH KNEES: Primary | ICD-10-CM

## 2017-03-14 DIAGNOSIS — M75.122 COMPLETE TEAR OF LEFT ROTATOR CUFF: ICD-10-CM

## 2017-03-14 DIAGNOSIS — G89.29 CHRONIC PAIN OF BOTH KNEES: Primary | ICD-10-CM

## 2017-03-14 DIAGNOSIS — Z79.01 WARFARIN ANTICOAGULATION: Primary | ICD-10-CM

## 2017-03-14 LAB
INR BLD: 2.5
PT POC: NORMAL SEC
VALID INTERNAL CONTROL?: YES

## 2017-03-14 RX ORDER — TRIAMCINOLONE ACETONIDE 40 MG/ML
60 INJECTION, SUSPENSION INTRA-ARTICULAR; INTRAMUSCULAR ONCE
Qty: 2 ML | Refills: 0
Start: 2017-03-14 | End: 2017-03-14

## 2017-03-14 RX ORDER — LIDOCAINE HYDROCHLORIDE 10 MG/ML
6 INJECTION INFILTRATION; PERINEURAL ONCE
Qty: 6 ML | Refills: 0
Start: 2017-03-14 | End: 2017-03-14

## 2017-03-14 NOTE — PROGRESS NOTES
Patient: Candice Pan                MRN: [id number]       SSN: [id number]  YOB: 1955        AGE: 59 y.o. SEX: male  Body mass index is 31.71 kg/(m^2). PCP: Skylar Lim MD  03/14/17      Chief Complaint   Patient presents with    Shoulder Pain     left mri       HISTORY OF PRESENT ILLNESS: Candice Pan is a 59 y.o. male who presents to the office for management of his left shoulder  pain  And right knee pain. He worked in both [employer] for over 20 years going up and ladders. Dr Maria Henry told him he had OA years ago but with shoudler surgery his pain resolved. It recently reoccurred and was unresponsive to treatment and was snet for an MRI. He is still having pain at night in the shoulder also with raising his arm above his head. MRI shows a full thickness tear with approximately 1.5 of retraction at the anterior supraspinatus  insertion site. Review of Systems   Constitutional: Negative. HENT: Negative. Eyes: Negative. Respiratory: Negative. Cardiovascular: Negative. Gastrointestinal: Negative. Genitourinary: Negative. Musculoskeletal: Positive for joint pain (left shoulder). Skin: Negative. Neurological: Negative. Endo/Heme/Allergies: Negative. Psychiatric/Behavioral: Negative. Social History     Social History    Marital status:      Spouse name: N/A    Number of children: N/A    Years of education: N/A     Occupational History    Not on file.      Social History Main Topics    Smoking status: Never Smoker    Smokeless tobacco: Never Used    Alcohol use No    Drug use: No    Sexual activity: Not Currently     Other Topics Concern    Not on file     Social History Narrative        Past Medical History:   Diagnosis Date    Arthritis     Bleeding     Blood clot in the legs     Esophageal reflux     Headache(784.0)     migraine    High cholesterol     Hypertension     Lower back pain 11/6/2010    Other chest pain     Personal history of venous thrombosis and embolism     Pure hypercholesterolemia     Right buttock pain 11/6/2010    Right foot pain     Spinal stenosis     Tendonitis, tibialis     anterior    Thromboembolus (HCC)         Allergies   Allergen Reactions    Augmentin [Amoxicillin-Pot Clavulanate] Itching    Penicillins Rash         Current Outpatient Prescriptions   Medication Sig    lisinopril-hydroCHLOROthiazide (PRINZIDE, ZESTORETIC) 20-12.5 mg per tablet TAKE 1 TABLET BY MOUTH DAILY    metaxalone (SKELAXIN) 800 mg tablet Take 1 Tab by mouth three (3) times daily. Take 1 po bid - tid prn muscle spasm  Indications: MUSCLE SPASM    warfarin (COUMADIN) 5 mg tablet TAKE 2 AND 1/2 TABLETS BY MOUTH DAILY OR AS DIRECTED    lisinopril-hydroCHLOROthiazide (PRINZIDE, ZESTORETIC) 20-12.5 mg per tablet TAKE 1 TABLET BY MOUTH DAILY    montelukast (SINGULAIR) 10 mg tablet TAKE 1 TABLET BY MOUTH EVERY DAY    fluticasone (FLONASE) 50 mcg/actuation nasal spray 2 spray each nostril daily    azithromycin (ZITHROMAX) 250 mg tablet Take 2 tablets today, then take 1 tablet daily    HYDROcodone-acetaminophen (NORCO) 7.5-325 mg per tablet 1 po bid prn severe pain    acetaminophen (TYLENOL) 500 mg tablet Take  by mouth every six (6) hours as needed for Pain.  predniSONE (DELTASONE) 10 mg tablet 3 tabs x 2 days, 2 tabs x 3 days, 1 tab x 4 days, 1/2 tab x 5 days    lansoprazole (PREVACID) 30 mg capsule Take 1 Cap by mouth Daily (before breakfast).  Butalbital-Acetaminophen-Caff -40 mg cap Take 2 capsules every 8 hours as needed for headache    labetalol (NORMODYNE) 100 mg tablet TAKE 1 TABLET BY MOUTH TWICE DAILY. No current facility-administered medications for this visit. Physical Exam  Constitutional: he is oriented to person, place, and time and well-developed, well-nourished, and in no distress. No distress. HENT:   Head: Normocephalic and atraumatic.    Right Ear: Hearing normal.   Left Ear: Hearing normal.   Nose: Nose normal.   Eyes: Conjunctivae, EOM and lids are normal. Pupils are equal, round, and reactive to light. Neck: Trachea normal.   Pulmonary/Chest: Effort normal and breath sounds normal. No respiratory distress. Abdominal: Soft. Neurological: he is alert and oriented to person, place, and time. Skin: Skin is warm, dry and intact. he is not diaphoretic. Psychiatric: Affect normal.   Nursing note and vitals reviewed. Ortho Exam   Flexion, abduction and internal rotation recreate his left shoulder pain. He had TTP over the lateral aspect of the deltoid. As before, sensation and circulation of the LE was normal. Right knee showed no effusion or increased warmth but mild effusion. Ligaments were stable. He TTP along medial joint line of the right knee. Procedure: After timeout and under sterile conditions, patient's right knee was injected with 6 cc of Xylocaine and 1.5 cc of Kenalog. [facility]  OFFICE PROCEDURE PROGRESS NOTE        Chart reviewed for the following:   Clotilde Abdul MD, have reviewed the History, Physical and updated the Allergic reactions for [address] [place] [address] performed immediately prior to start of procedure:   Clotilde Abdul MD, have performed the following reviews on [facility] prior to the start of the procedure:            * Patient was identified by name and date of birth   * Agreement on procedure being performed was verified  * Risks and Benefits explained to the patient  * Procedure site verified and marked as necessary  * Patient was positioned for comfort  * Consent was signed and verified     Time: 8:19 AM        Date of procedure: 3/14/2017    Procedure performed by:   Amee Villalta MD    Provider assisted by: None     Patient assisted by: self    How tolerated by patient: tolerated the procedure well with no complications    Comments: none      RADIOGRAPHS: MRI Results (most recent):    Results from Hospital Encounter encounter on 03/09/17   MRI SHOULDER LT WO CONT   Narrative Multisequence multiplanar MR images of the left shoulder were obtained. Full-thickness tear at the anterior distal supraspinatus tendon, with a gap of  approximately 1.1 cm in transverse and 1.6 cm in AP dimensions. Underlying  tendinosis. Mild muscular atrophy suspected. Moderate infraspinatus and  subscapularis tendinosis with no focal tear. Inflammation in the subdeltoid  bursa also seen. Glenohumeral joint effusion with subscapularis recess fluid and subcoracoid  fluid collection. Labrum, cartilage and biceps tendon are intact. However  evaluation limited with significant motion artifacts. Slightly curved acromion undersurface. Moderate hypertrophy with AC joint  effusion. Impression IMPRESSION:  1. Full-thickness tear in supraspinatus tendon as described, with underlying  tendinosis and mild muscular atrophy. Infraspinatus and subscapularis  tendinosis. Subdeltoid and subcoracoid bursitis. 2. AC joint effusion. Type II acromion. Mild hypertrophy of AC joint. Standing AP and tunnel views and sunrise views show equal mild joint space narrowing of both knees equal in magnitude of the right and left. IMPRESSION & PLAN: Right knee OA and rotator cuff tear of he right shoulder. After discussing treatment options, he thinks he would like to proceed with repair of the rotator cuff. He will check with his employer about taking time off and will contact Dr. Barbara Mina for the repair. For his knee he requested a cortisone injection. I will see him back prn.       Scribed by Moody Alert ([address]) as dictated by Kamlesh Louis MD.

## 2017-03-15 NOTE — PROGRESS NOTES
Mr. Dorcas Bumpers is here today for anticoagulation monitoring for the diagnosis of Atrial Fibrillation. His INR goal is 2.0-3.0 and his current Coumadin dose is 82.5 mg weekly. Today's findings include an INR of 2.5 (normal INR range 0.8-1.2). Considering Mr. Winters's past history, todays findings, and per the coumadin policy/protocol, Mr. Domenico Mir was instructed to take Coumadin as follows,  Continue same dose. He was also instructed to schedule an appointment in 2 weeks prior to leaving for an INR check. A full discussion of the nature of anticoagulants has been carried out. A full discussion of the need for frequent and regular monitoring, precise dosage adjustment and compliance was stressed. Side effects of potential bleeding were discussed and Mr. Domenico Mir was instructed to call [phone] if there are any signs of abnormal bleeding. Mr. Domenico Mir was instructed to avoid any OTC items containing aspirin or ibuprofen and prior to starting any new OTC products to consult with his physician or pharmacist to ensure no drug interactions are present. Mr. Domenico Mir was instructed to avoid any major changes in his general diet and to avoid alcohol consumption. .    Mr. Domenico Mir was provided a literature booklet, \"Treatment with Warfarin (Coumadin)\", that includes topics on understanding coumadin therapy, drug interaction considerations, vitamin K and coumadin use, interactions with foods and supplements containing vitamin K, and the use of herbal products. Mr. Domenico Mir verbalized his understanding of all instructions and will call the office with any questions, concerns, or signs of abnormal bleeding or blood clot.

## 2017-03-29 ENCOUNTER — OFFICE VISIT (OUTPATIENT)
Dept: ORTHOPEDIC SURGERY | Age: 62
End: 2017-03-29

## 2017-03-29 VITALS
BODY MASS INDEX: 31.21 KG/M2 | SYSTOLIC BLOOD PRESSURE: 128 MMHG | HEIGHT: 70 IN | WEIGHT: 218 LBS | DIASTOLIC BLOOD PRESSURE: 79 MMHG | HEART RATE: 79 BPM

## 2017-03-29 DIAGNOSIS — M75.122 COMPLETE TEAR OF LEFT ROTATOR CUFF: Primary | ICD-10-CM

## 2017-03-29 RX ORDER — RANITIDINE 150 MG/1
TABLET, FILM COATED ORAL
Refills: 5 | COMMUNITY
Start: 2017-03-15 | End: 2018-04-13

## 2017-03-29 RX ORDER — BUTALBITAL, ACETAMINOPHEN AND CAFFEINE 300; 40; 50 MG/1; MG/1; MG/1
CAPSULE ORAL
Qty: 540 CAP | Refills: 3 | Status: SHIPPED | OUTPATIENT
Start: 2017-03-29 | End: 2017-12-13 | Stop reason: SDUPTHER

## 2017-03-29 NOTE — PROGRESS NOTES
Anita Galloway  1955   Chief Complaint   Patient presents with    Shoulder Pain     Left        HISTORY OF PRESENT ILLNESS  Anita Galloway is a 59 y.o. male who presents today for evaluation of left shoulder pain. He rates his pain 2/10 today. He recalls 8-9 years ago he had problems with B/L shoulders. He had MRIs done on them that only showed spurring at that time. He has attended PT. Dr. Shameka Marshall told him that he had OA years ago. The pain bothers him at night. He has pain with certain reaching motions. Patient was referred by Dr. Nehemiah Obrien for further evaluation. He has completed a recent MRI that shows a full-thickness tear. 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 Allergies   Allergen Reactions    Augmentin [Amoxicillin-Pot Clavulanate] Itching    Penicillins Rash        Past Medical History:   Diagnosis Date    Arthritis     Bleeding     Blood clot in the legs     Esophageal reflux     Headache(784.0)     migraine    High cholesterol     Hypertension     Lower back pain 11/6/2010    Other chest pain     Personal history of venous thrombosis and embolism     Pure hypercholesterolemia     Right buttock pain 11/6/2010    Right foot pain     Spinal stenosis     Tendonitis, tibialis     anterior    Thromboembolus ([facility] [address]       Social History     Social History    Marital status:      Spouse name: N/A    Number of children: N/A    Years of education: N/A     Occupational History    Not on file.      Social History Main Topics    Smoking status: Never Smoker    Smokeless tobacco: Never Used    Alcohol use No    Drug use: No    Sexual activity: Not Currently     Other Topics Concern    Not on file     Social History Narrative      Past Surgical History:   Procedure Laterality Date   Serafin Yadav  06-25-12    Right foot with excision of bursa and adipose tissue from fifth metatarsal base by Dr. Perla Talbot      lower back (1992 and 2000)    HX OTHER SURGICAL      left foot (2008)    LAMINOTOMY      NERVE BLOCK        Family History   Problem Relation Age of Onset   [de-identified] Arthritis-rheumatoid Mother     Heart Disease Father     Cancer Father     Hypertension Other       Current Outpatient Prescriptions   Medication Sig    raNITIdine (ZANTAC) 150 mg tablet TK 1 T PO HS    lisinopril-hydroCHLOROthiazide (PRINZIDE, ZESTORETIC) 20-12.5 mg per tablet TAKE 1 TABLET BY MOUTH DAILY    metaxalone (SKELAXIN) 800 mg tablet Take 1 Tab by mouth three (3) times daily. Take 1 po bid - tid prn muscle spasm  Indications: MUSCLE SPASM    warfarin (COUMADIN) 5 mg tablet TAKE 2 AND 1/2 TABLETS BY MOUTH DAILY OR AS DIRECTED    lisinopril-hydroCHLOROthiazide (PRINZIDE, ZESTORETIC) 20-12.5 mg per tablet TAKE 1 TABLET BY MOUTH DAILY    montelukast (SINGULAIR) 10 mg tablet TAKE 1 TABLET BY MOUTH EVERY DAY    fluticasone (FLONASE) 50 mcg/actuation nasal spray 2 spray each nostril daily    acetaminophen (TYLENOL) 500 mg tablet Take  by mouth every six (6) hours as needed for Pain.  lansoprazole (PREVACID) 30 mg capsule Take 1 Cap by mouth Daily (before breakfast).  labetalol (NORMODYNE) 100 mg tablet TAKE 1 TABLET BY MOUTH TWICE DAILY.  butalbital-acetaminophen-caff (FIORICET) -40 mg per capsule Take 2 capsules every 8 hours as needed for headache    azithromycin (ZITHROMAX) 250 mg tablet Take 2 tablets today, then take 1 tablet daily    HYDROcodone-acetaminophen (NORCO) 7.5-325 mg per tablet 1 po bid prn severe pain    predniSONE (DELTASONE) 10 mg tablet 3 tabs x 2 days, 2 tabs x 3 days, 1 tab x 4 days, 1/2 tab x 5 days     No current facility-administered medications for this visit. REVIEW OF SYSTEM   Patient denies: Weight loss, Fever/Chills, HA, Visual changes, Fatigue, Chest pain, SOB, Abdominal pain, N/V/D/C, Blood in stool or urine, Edema. Pertinent positive as above in HPI.  All others were negative    PHYSICAL EXAM:   Visit Vitals    BP 128/79    Pulse 79    Ht 5' 10\" (1.778 m)    Wt 218 lb (98.9 kg)    BMI 31.28 kg/m2     The patient is a well-developed, well-nourished male   in no acute distress. The patient is alert and oriented times three. The patient is alert and oriented times three. Mood and affect are normal.  LYMPHATIC: lymph nodes are not enlarged and are within normal limits  SKIN: normal in color and non tender to palpation. There are no bruises or abrasions noted. NEUROLOGICAL: Motor sensory exam is within normal limits. Reflexes are equal bilaterally. There is normal sensation to pinprick and light touch  MUSCULOSKELETAL:  Examination Left shoulder   Skin Intact   AC joint tenderness -   Biceps tenderness -   Forward flexion/Elevation    Active abduction    Glenohumeral abduction 90   External rotation ROM 45   Internal rotation ROM 30   Apprehension -   Rashmis Relocation -   Jerk -   Load and Shift -   Obriens -   Speeds -   Impingement sign +   Supraspinatus/Empty Can +4/5   External Rotation Strength -, 5/5   Lift Off/Belly Press -, 5/5   Neurovascular Intact          IMAGING: MRI of the left shoulder dated 3/9/17 was reviewed and read:   IMPRESSION:  1. Full-thickness tear in supraspinatus tendon as described, with underlying  tendinosis and mild muscular atrophy. Infraspinatus and subscapularis  tendinosis. Subdeltoid and subcoracoid bursitis. 2. AC joint effusion. Type II acromion. Mild hypertrophy of AC joint. IMPRESSION:      ICD-10-CM ICD-9-CM    1. Complete tear of left rotator cuff M75.122 727.61         PLAN: Patient has had pain in the shoulder for years. At this time, he would like to hold off on surgery. I instructed him to limit his activity for the time being. He will follow up when he is ready to proceed with surgery. Office note will be sent to the referring provider.   Follow-up Disposition: Not on File    Scribed by Ruby Cranker [address]) as dictated by Tung Govea MD KO, Dr. Madeleine Abel, confirm that all documentation is accurate.     Madeleine Abel M.D.   [facility]

## 2017-04-07 ENCOUNTER — OFFICE VISIT (OUTPATIENT)
Dept: INTERNAL MEDICINE CLINIC | Age: 62
End: 2017-04-07

## 2017-04-07 VITALS
HEIGHT: 70 IN | DIASTOLIC BLOOD PRESSURE: 78 MMHG | HEART RATE: 94 BPM | TEMPERATURE: 98.4 F | RESPIRATION RATE: 16 BRPM | BODY MASS INDEX: 31.64 KG/M2 | WEIGHT: 221 LBS | OXYGEN SATURATION: 97 % | SYSTOLIC BLOOD PRESSURE: 124 MMHG

## 2017-04-07 DIAGNOSIS — Z79.01 WARFARIN ANTICOAGULATION: Primary | ICD-10-CM

## 2017-04-07 DIAGNOSIS — Z86.718 PERSONAL HISTORY OF VENOUS THROMBOSIS AND EMBOLISM: ICD-10-CM

## 2017-04-07 DIAGNOSIS — J06.9 ACUTE URI: ICD-10-CM

## 2017-04-07 LAB
INR BLD: 1.7
PT POC: NORMAL SEC
VALID INTERNAL CONTROL?: YES

## 2017-04-07 RX ORDER — AZITHROMYCIN 250 MG/1
TABLET, FILM COATED ORAL
Qty: 6 TAB | Refills: 0 | Status: SHIPPED | OUTPATIENT
Start: 2017-04-07 | End: 2017-04-12

## 2017-04-07 NOTE — PROGRESS NOTES
Mr. Yvrose Hutchison is here today for anticoagulation monitoring for the diagnosis of venous thrombosis. His INR goal is 2.0-3.0 and his current Coumadin dose is 10 mg Mon Wed Fri then 12.5 all other days. Today's findings include an INR of 1.7 (normal INR range 0.8-1.2). Considering Mr. Winters's past history, todays findings, and per the coumadin policy/protocol, Mr. Stella Friedman was instructed to take Coumadin as follows,  10 mg Mon Thurs then 12.5 all other days. He was also instructed to schedule an appointment in 10 days prior to leaving for an INR check. A full discussion of the nature of anticoagulants has been carried out. A full discussion of the need for frequent and regular monitoring, precise dosage adjustment and compliance was stressed. Side effects of potential bleeding were discussed and Mr. Stella Friedman was instructed to call [phone] if there are any signs of abnormal bleeding. Mr. Stella Friedman was instructed to avoid any OTC items containing aspirin or ibuprofen and prior to starting any new OTC products to consult with his physician or pharmacist to ensure no drug interactions are present. Mr. Stella Friedman was instructed to avoid any major changes in his general diet and to avoid alcohol consumption. .      Mr. Stella Friedman verbalized his understanding of all instructions and will call the office with any questions, concerns, or signs of abnormal bleeding or blood clot.

## 2017-04-07 NOTE — PROGRESS NOTES
1. Have you been to the ER, urgent care clinic since your last visit? Hospitalized since your last visit? No    2. Have you seen or consulted any other health care providers outside of the [address] [place] [address] since your last visit? Include any pap smears or colon screening.  No

## 2017-04-07 NOTE — PATIENT INSTRUCTIONS
Health Maintenance Due   Topic Date Due    Hepatitis C Test  1955    DTaP/Tdap/Td  (1 - Tdap) 04/13/1974    Shingles Vaccine  04/13/2013    Flu Vaccine  08/01/2016       Taking Warfarin Safely (Short-Term): After Your Visit  Your Care Instructions  Warfarin is a medicine that you take to prevent blood clots. It is often called a blood thinner. Doctors give warfarin (such as Coumadin) to reduce the risk of blood clots. You may be at risk for blood clots if you have certain kinds of surgery or some other health problems. Your doctor will tell you when you can stop taking warfarin. Warfarin slows the amount of time it takes for your blood to clot. It can cause bleeding problems. Even if you've been taking warfarin for a while, it's important to know how to take it safely. Foods and other medicines can affect the way warfarin works. Some can make warfarin work too well. This can cause bleeding problems. And some can make it work poorly, so that it does not prevent blood clots very well. You will need regular blood tests to check how long it takes for your blood to form a clot. This test is called a PT or prothrombin time test. The result of the test is called an INR level. Depending on the test results, your doctor or anticoagulation clinic may adjust your dose of warfarin. Follow-up care is a key part of your treatment and safety. Be sure to make and go to all appointments, and call your doctor if you are having problems. It's also a good idea to know your test results and keep a list of the medicines you take. How can you care for yourself at home? Take warfarin safely   · Take your warfarin at the same time each day. · If you miss a dose of warfarin, don't take an extra dose to make up for it. Your doctor can tell you exactly what to do so you don't take too much or too little. · Wear medical alert jewelry that lets others know that you take warfarin. You can buy this at most drugstores.   · Don't take warfarin if you are pregnant or planning to get pregnant. Talk to your doctor about how you can prevent getting pregnant while you are taking it. · Don't change your dose or stop taking warfarin unless your doctor tells you to. Effects of medicines and food on warfarin  · Don't start or stop taking any medicines, vitamins, or natural remedies unless you first talk to your doctor. Many medicines can affect how warfarin works. These include aspirin and other pain relievers, over-the-counter medicines, multivitamins, dietary supplements, and herbal products. · Tell all of your doctors and pharmacists that you take warfarin. Some prescription medicines can affect how warfarin works. · Keep the amount of vitamin K in your diet about the same from day to day. Do not suddenly eat a lot more or a lot less food that is rich in vitamin K than you usually do. Vitamin K affects how warfarin works and how your blood clots. Talk with your doctor before making big changes in your diet. Vitamin K is in many foods, such as:  ¨ Leafy greens, such as kale, cabbage, spinach, Swiss chard, and lettuce. ¨ Canola and soybean oils. ¨ Green vegetables, such as asparagus, broccoli, and [place] sprouts. ¨ Vegetable drinks, green tea leaves, and some dietary supplement drinks. · Avoid cranberry juice and other cranberry products. They can increase the effects of warfarin. · Limit your use of alcohol. Avoid bleeding by preventing falls and injuries  · Wear slippers or shoes with nonskid soles. · Remove throw rugs and clutter. · Rearrange furniture and electrical cords to keep them out of walking paths. · Keep stairways, porches, and outside walkways well lit. Use night-lights in hallways and bathrooms. · Be extra careful when you work with sharp tools or knives. When should you call for help? Call 911 anytime you think you may need emergency care.  For example, call if:  · You have a sudden, severe headache that is different from past headaches. Call your doctor now or seek immediate medical care if:  · You have any abnormal bleeding, such as:  ¨ Nosebleeds. ¨ Vaginal bleeding that is different (heavier, more frequent, at a different time of the month) than what you are used to. ¨ Bloody or black stools, or rectal bleeding. ¨ Bloody or pink urine. Watch closely for changes in your health, and be sure to contact your doctor if you have any problems. Where can you learn more? Go to [employer]  Enter N172 in the search box to learn more about \"Taking Warfarin Safely (Short-Term): After Your Visit. \"   © 4462-4123 [employer]wise, [employer]orporated. Care instructions adapted under license by [employer] (which disclaims liability or warranty for this information). This care instruction is for use with your licensed healthcare professional. If you have questions about a medical condition or this instruction, always ask your healthcare professional. [facility] any warranty or liability for your use of this information.   Content Version: 73.6.170047; Current as of: March 12, 2014

## 2017-04-11 ENCOUNTER — TELEPHONE (OUTPATIENT)
Dept: INTERNAL MEDICINE CLINIC | Age: 62
End: 2017-04-11

## 2017-04-11 NOTE — TELEPHONE ENCOUNTER
This will be addressed tomorrow 4/12--patient is already on the schedule to see Dr Joselin Ortega tomorrow.

## 2017-04-11 NOTE — TELEPHONE ENCOUNTER
Patient wife called because patient saw Dr Robles 04/07/2017 and he is still not feeling well he has a cough which gets worst at night and now he has lost his Voice His work will not let him come back until better.  Would like someone to call #[phone]

## 2017-04-12 ENCOUNTER — OFFICE VISIT (OUTPATIENT)
Dept: INTERNAL MEDICINE CLINIC | Age: 62
End: 2017-04-12

## 2017-04-12 VITALS
WEIGHT: 220 LBS | BODY MASS INDEX: 31.5 KG/M2 | HEART RATE: 80 BPM | TEMPERATURE: 98.2 F | HEIGHT: 70 IN | OXYGEN SATURATION: 97 % | DIASTOLIC BLOOD PRESSURE: 68 MMHG | RESPIRATION RATE: 16 BRPM | SYSTOLIC BLOOD PRESSURE: 98 MMHG

## 2017-04-12 DIAGNOSIS — J06.9 ACUTE URI: Primary | ICD-10-CM

## 2017-04-12 DIAGNOSIS — R05.9 COUGH: ICD-10-CM

## 2017-04-12 RX ORDER — CODEINE PHOSPHATE AND GUAIFENESIN 10; 100 MG/5ML; MG/5ML
SOLUTION ORAL
Qty: 120 ML | Refills: 1 | Status: SHIPPED | OUTPATIENT
Start: 2017-04-12 | End: 2017-05-30 | Stop reason: ALTCHOICE

## 2017-04-12 RX ORDER — LISINOPRIL AND HYDROCHLOROTHIAZIDE 12.5; 2 MG/1; MG/1
TABLET ORAL
Qty: 30 TAB | Refills: 0 | Status: SHIPPED | OUTPATIENT
Start: 2017-04-12 | End: 2017-05-30 | Stop reason: SDUPTHER

## 2017-04-12 NOTE — MR AVS SNAPSHOT
Visit Information Date & Time Provider Department Dept. Phone Encounter #  
 4/12/2017  8:00 AM Bobby Box MD [facility] INTERNAL [facility]ICINE OF Alan Das [phone] [id number] Follow-up Instructions Return if symptoms worsen or fail to improve. Follow-up and Disposition History Your Appointments 6/8/2017  3:30 PM  
Follow Up with Frankey Iles, MD  
[facility] [address] [place] [address]) Appt Note: 3 MONTH FU, W/C NO COPAY  
 [address] [facility] [address]  
[phone]  
  
   
 [address] [facility] [address] Upcoming Health Maintenance Date Due Hepatitis C Screening 1955 DTaP/Tdap/Td series (1 - Tdap) 4/13/1974 ZOSTER VACCINE AGE 60> 4/13/2013 COLONOSCOPY 5/27/2026 Allergies as of 4/12/2017  Review Complete On: 4/12/2017 By: Bobby Box MD  
  
 Severity Noted Reaction Type Reactions Augmentin [Amoxicillin-pot Clavulanate]    Itching Penicillins    Rash Current Immunizations  Never Reviewed No immunizations on file. Not reviewed this visit You Were Diagnosed With   
  
 Codes Comments Acute URI    -  Primary ICD-10-CM: J06.9 ICD-9-CM: 465.9 Cough     ICD-10-CM: R05 ICD-9-CM: 180. 2 Vitals BP Pulse Temp Resp Height(growth percentile) Weight(growth percentile) 98/68 (BP 1 Location: Left arm, BP Patient Position: Sitting) 80 98.2 °F (36.8 °C) 16 5' 10\" (1.778 m) 220 lb (99.8 kg) SpO2 BMI Smoking Status 97% 31.57 kg/m2 Never Smoker Vitals History BMI and BSA Data Body Mass Index Body Surface Area  
 31.57 kg/m 2 2.22 m 2 Preferred Pharmacy Pharmacy Name Phone [facility] [address] [place] [address] [address] [phone] Your Updated Medication List  
  
   
 This list is accurate as of: 17  8:13 AM.  Always use your most recent med list.  
  
  
  
  
 acetaminophen 500 mg tablet Commonly known as:  TYLENOL Take  by mouth every six (6) hours as needed for Pain. azithromycin 250 mg tablet Commonly known as:  Karolina Client Take 2 tablets today, then take 1 tablet daily  
  
 butalbital-acetaminophen-caff -40 mg per capsule Commonly known as:  [employer] Take 2 capsules every 8 hours as needed for headache  
  
 fluticasone 50 mcg/actuation nasal spray Commonly known as:  FLONASE  
2 spray each nostril daily  
  
 guaiFENesin-codeine 100-10 mg/5 mL solution Commonly known as:  CHERATUSSIN AC  
1 or 2 tsp q 6 hrs prn cough HYDROcodone-acetaminophen 7.5-325 mg per tablet Commonly known as:  Joyice Breeze 1 po bid prn severe pain  
  
 labetalol 100 mg tablet Commonly known as:  NORMODYNE  
TAKE 1 TABLET BY MOUTH TWICE DAILY. lansoprazole 30 mg capsule Commonly known as:  PREVACID Take 1 Cap by mouth Daily (before breakfast). lisinopril-hydroCHLOROthiazide 20-12.5 mg per tablet Commonly known as:  PRINZIDE, ZESTORETIC  
TAKE 1 TABLET BY MOUTH DAILY  
  
 metaxalone 800 mg tablet Commonly known as:  SKELAXIN Take 1 Tab by mouth three (3) times daily. Take 1 po bid - tid prn muscle spasm  Indications: MUSCLE SPASM  
  
 montelukast 10 mg tablet Commonly known as:  SINGULAIR  
TAKE 1 TABLET BY MOUTH EVERY DAY  
  
 raNITIdine 150 mg tablet Commonly known as:  ZANTAC TK 1 T PO HS  
  
 warfarin 5 mg tablet Commonly known as:  COUMADIN  
TAKE 2 AND 1/2 TABLETS BY MOUTH DAILY OR AS DIRECTED Prescriptions Printed Refills  
 guaiFENesin-codeine (CHERATUSSIN AC) 100-10 mg/5 mL solution 1 Si or 2 tsp q 6 hrs prn cough Class: Print Follow-up Instructions Return if symptoms worsen or fail to improve. Patient Instructions Upper Respiratory Infection (Cold): Care Instructions Your Care Instructions An upper respiratory infection, or URI, is an infection of the nose, sinuses, or throat. URIs are spread by coughs, sneezes, and direct contact. The common cold is the most frequent kind of URI. The flu and sinus infections are other kinds of URIs. Almost all URIs are caused by viruses. Antibiotics won't cure them. But you can treat most infections with home care. This may include drinking lots of fluids and taking over-the-counter pain medicine. You will probably feel better in 4 to 10 days. The doctor has checked you carefully, but problems can develop later. If you notice any problems or new symptoms, get medical treatment right away. Follow-up care is a key part of your treatment and safety. Be sure to make and go to all appointments, and call your doctor if you are having problems. It's also a good idea to know your test results and keep a list of the medicines you take. How can you care for yourself at home? · To prevent dehydration, drink plenty of fluids, enough so that your urine is light yellow or clear like water. Choose water and other caffeine-free clear liquids until you feel better. If you have kidney, heart, or liver disease and have to limit fluids, talk with your doctor before you increase the amount of fluids you drink. · Take an over-the-counter pain medicine, such as acetaminophen (Tylenol), ibuprofen (Advil, Motrin), or naproxen (Aleve). Read and follow all instructions on the label. · Before you use cough and cold medicines, check the label. These medicines may not be safe for young children or for people with certain health problems. · Be careful when taking over-the-counter cold or flu medicines and Tylenol at the same time. Many of these medicines have acetaminophen, which is Tylenol.  Read the labels to make sure that you are not taking more than the recommended dose. Too much acetaminophen (Tylenol) can be harmful. · Get plenty of rest. 
· Do not smoke or allow others to smoke around you. If you need help quitting, talk to your doctor about stop-smoking programs and medicines. These can increase your chances of quitting for good. When should you call for help? Call 911 anytime you think you may need emergency care. For example, call if: 
· You have severe trouble breathing. Call your doctor now or seek immediate medical care if: 
· You seem to be getting much sicker. · You have new or worse trouble breathing. · You have a new or higher fever. · You have a new rash. Watch closely for changes in your health, and be sure to contact your doctor if: 
· You have a new symptom, such as a sore throat, an earache, or sinus pain. · You cough more deeply or more often, especially if you notice more mucus or a change in the color of your mucus. · You do not get better as expected. Where can you learn more? Go to http://rivka-jarrell.info/. Enter F352 in the search box to learn more about \"Upper Respiratory Infection (Cold): Care Instructions. \" Current as of: June 30, 2016 Content Version: 11.2 © 3608-4089 [employer]. Care instructions adapted under license by [employer] (which disclaims liability or warranty for this information). If you have questions about a medical condition or this instruction, always ask your healthcare professional. [place] [address] any warranty or liability for your use of this information. Introducing [facility]! Sophie Mccormick introduces Jobinasecond patient portal. Now you can access parts of your medical record, email your doctor's office, and request medication refills online. 1. In your internet browser, go to [email or web address] 2. Click on the First Time User? Click Here link in the Sign In box.  You will see the New Member Sign Up page. 3. Enter your Parabel Access Code exactly as it appears below. You will not need to use this code after youve completed the sign-up process. If you do not sign up before the expiration date, you must request a new code. · Parabel Access Code: [email or web address] Expires: 6/24/2017  4:28 PM 
 
4. Enter the last four digits of your Social Security Number (xxxx) and Date of Birth (mm/dd/yyyy) as indicated and click Submit. You will be taken to the next sign-up page. 5. Create a UYA100t ID. This will be your Parabel login ID and cannot be changed, so think of one that is secure and easy to remember. 6. Create a Parabel password. You can change your password at any time. 7. Enter your Password Reset Question and Answer. This can be used at a later time if you forget your password. 8. Enter your e-mail address. You will receive e-mail notification when new information is available in [address]. 9. Click Sign Up. You can now view and download portions of your medical record. 10. Click the Download Summary menu link to download a portable copy of your medical information. If you have questions, please visit the Frequently Asked Questions section of the Parabel website. Remember, Parabel is NOT to be used for urgent needs. For medical emergencies, dial 911. Now available from your iPhone and Android! Please provide this summary of care documentation to your next provider. Your primary care clinician is listed as Becki Driscoll. If you have any questions after today's visit, please call [phone].

## 2017-04-12 NOTE — PROGRESS NOTES
HPI:   5 days of head congestion, facial discomfort, NP cough w/o fever, dyspnea, CP, or N  Given zpak 4/7/17 at outset of sxs      ROS is otherwise negative. Past Medical History:   Diagnosis Date    Arthritis     Bleeding     Blood clot in the legs     Esophageal reflux     Headache     migraine    High cholesterol     Hypertension     Lower back pain 11/6/2010    Other chest pain     Personal history of venous thrombosis and embolism     Pure hypercholesterolemia     Right buttock pain 11/6/2010    Right foot pain     Spinal stenosis     Tendonitis, tibialis     anterior    Thromboembolus (HCC)        Past Surgical History:   Procedure Laterality Date    HX ORTHOPAEDIC  06-25-12    Right foot with excision of bursa and adipose tissue from fifth metatarsal base by Dr. Charlene Negro      lower back (1992 and 2000)    HX OTHER SURGICAL      left foot (2008)    LAMINOTOMY      NERVE BLOCK         Social History     Social History    Marital status:      Spouse name: N/A    Number of children: N/A    Years of education: N/A     Occupational History    Not on file.      Social History Main Topics    Smoking status: Never Smoker    Smokeless tobacco: Never Used    Alcohol use No    Drug use: No    Sexual activity: Not Currently     Other Topics Concern    Not on file     Social History Narrative       Allergies   Allergen Reactions    Augmentin [Amoxicillin-Pot Clavulanate] Itching    Penicillins Rash       Family History   Problem Relation Age of Onset   [employer] Arthritis-rheumatoid Mother     Heart Disease Father     Cancer Father     Hypertension Other        Current Outpatient Prescriptions   Medication Sig Dispense Refill    azithromycin (ZITHROMAX) 250 mg tablet Take 2 tablets today, then take 1 tablet daily 6 Tab 0    butalbital-acetaminophen-caff (FIORICET) -40 mg per capsule Take 2 capsules every 8 hours as needed for headache 540 Cap 3    raNITIdine (ZANTAC) 150 mg tablet TK 1 T PO HS  5    metaxalone (SKELAXIN) 800 mg tablet Take 1 Tab by mouth three (3) times daily. Take 1 po bid - tid prn muscle spasm  Indications: MUSCLE SPASM 180 Tab 1    warfarin (COUMADIN) 5 mg tablet TAKE 2 AND 1/2 TABLETS BY MOUTH DAILY OR AS DIRECTED 75 Tab 5    lisinopril-hydroCHLOROthiazide (PRINZIDE, ZESTORETIC) 20-12.5 mg per tablet TAKE 1 TABLET BY MOUTH DAILY 30 Tab 0    montelukast (SINGULAIR) 10 mg tablet TAKE 1 TABLET BY MOUTH EVERY DAY 90 Tab 0    fluticasone (FLONASE) 50 mcg/actuation nasal spray 2 spray each nostril daily 1 Bottle 1    HYDROcodone-acetaminophen (NORCO) 7.5-325 mg per tablet 1 po bid prn severe pain 60 Tab 0    acetaminophen (TYLENOL) 500 mg tablet Take  by mouth every six (6) hours as needed for Pain.  lansoprazole (PREVACID) 30 mg capsule Take 1 Cap by mouth Daily (before breakfast). 90 Cap 3    labetalol (NORMODYNE) 100 mg tablet TAKE 1 TABLET BY MOUTH TWICE DAILY. 180 Tab 4           Visit Vitals    BP 98/68 (BP 1 Location: Left arm, BP Patient Position: Sitting)    Pulse 80    Temp 98.2 °F (36.8 °C)    Resp 16    Ht 5' 10\" (1.778 m)    Wt 220 lb (99.8 kg)    SpO2 97%    BMI 31.57 kg/m2       PE  Well nourished in NAD  HEENT:   OP: clear. TMS: clear  Neck: supple w/o mass or bruits. Chest: clear. CV: RRR w/o m,r,g; pulses intact. Abd: soft, NT, w/o HSM or mass. Ext: tr edema. Neuro: NF. Assessment and Plan    Encounter Diagnoses   Name Primary?     Acute URI Yes    Cough    Explained recovery from URI is over 7 to 14 days  Given zpak 5 days ago  Continue OTC supportive care as well as flonase/singulair; jennifer AC prn  F/U next week if unimproved  OV 6 mos or prn  I have explained plan to patient and the patient verbalizes understanding

## 2017-04-12 NOTE — PATIENT INSTRUCTIONS

## 2017-04-14 NOTE — PROGRESS NOTES
The patient presents to the office today with the chief complaint of Sinus Congestion    HPI    The patient complains of sinus congestion with drainage and a cough. .  he denies fever. The symptoms have been present for the past 2 weeks. The patient remains on anticoagulation due to previous DVT. Review of Systems   HENT: Positive for congestion. Respiratory: Positive for cough. Negative for shortness of breath. Cardiovascular: Negative for chest pain and leg swelling. Allergies   Allergen Reactions    Augmentin [Amoxicillin-Pot Clavulanate] Itching    Penicillins Rash       Current Outpatient Prescriptions   Medication Sig Dispense Refill    lisinopril-hydroCHLOROthiazide (PRINZIDE, ZESTORETIC) 20-12.5 mg per tablet TAKE 1 TABLET BY MOUTH DAILY 30 Tab 0    guaiFENesin-codeine (CHERATUSSIN AC) 100-10 mg/5 mL solution 1 or 2 tsp q 6 hrs prn cough 120 mL 1    butalbital-acetaminophen-caff (FIORICET) -40 mg per capsule Take 2 capsules every 8 hours as needed for headache 540 Cap 3    raNITIdine (ZANTAC) 150 mg tablet TK 1 T PO HS  5    warfarin (COUMADIN) 5 mg tablet TAKE 2 AND 1/2 TABLETS BY MOUTH DAILY OR AS DIRECTED 75 Tab 5    lisinopril-hydroCHLOROthiazide (PRINZIDE, ZESTORETIC) 20-12.5 mg per tablet TAKE 1 TABLET BY MOUTH DAILY 30 Tab 0    montelukast (SINGULAIR) 10 mg tablet TAKE 1 TABLET BY MOUTH EVERY DAY 90 Tab 0    acetaminophen (TYLENOL) 500 mg tablet Take  by mouth every six (6) hours as needed for Pain.  lansoprazole (PREVACID) 30 mg capsule Take 1 Cap by mouth Daily (before breakfast). 90 Cap 3    labetalol (NORMODYNE) 100 mg tablet TAKE 1 TABLET BY MOUTH TWICE DAILY.  180 Tab 4       Past Medical History:   Diagnosis Date    Arthritis     Bleeding     Blood clot in the legs     Esophageal reflux     Headache     migraine    High cholesterol     Hypertension     Lower back pain 11/6/2010    Other chest pain     Personal history of venous thrombosis and embolism     Pure hypercholesterolemia     Right buttock pain 11/6/2010    Right foot pain     Spinal stenosis     Tendonitis, tibialis     anterior    Thromboembolus (HCC)        Past Surgical History:   [facility] Later[facility]    Right foot with excision of bursa and adipose tissue from fifth metatarsal base by Dr. Amanda Caballero      lower back (1992 and 2000)    HX OTHER SURGICAL      left foot (2008)    LAMINOTOMY      NERVE BLOCK         Social History     Social History    Marital status:      Spouse name: N/A    Number of children: N/A    Years of education: N/A     Occupational History    Not on file. Social History Main Topics    Smoking status: Never Smoker    Smokeless tobacco: Never Used    Alcohol use No    Drug use: No    Sexual activity: Not Currently     Other Topics Concern    Not on file     Social History Narrative       Patient does not have an advanced directive on file    Visit Vitals    /78 (BP 1 Location: Left arm, BP Patient Position: Sitting)    Pulse 94    Temp 98.4 °F (36.9 °C) (Tympanic)    Resp 16    Ht 5' 10\" (1.778 m)    Wt 221 lb (100.2 kg)    SpO2 97%    BMI 31.71 kg/m2       Physical Exam   HENT:   Ears:  Normal TM  Oral pharynx:  Normal   Neck: Carotid bruit is not present. No thyromegaly present. Cardiovascular: Normal rate and regular rhythm. Exam reveals no gallop. No murmur heard. Pulmonary/Chest: He has no wheezes. He has no rales. Abdominal: Soft. Bowel sounds are normal. He exhibits no distension and no mass. There is no tenderness. Musculoskeletal: He exhibits no edema. Lymphadenopathy:     He has no cervical adenopathy.        BMI:  BMI is high but it was not addressed during this visit due to an acute illness      Office Visit on 04/07/2017   Component Date Value Ref Range Status    VALID INTERNAL CONTROL POC 04/07/2017 Yes   Final    INR POC 04/07/2017 1.7   Final Anti-Coag visit on 03/14/2017   Component Date Value Ref Range Status    VALID INTERNAL CONTROL POC 03/14/2017 Yes   Final    INR POC 03/14/2017 2.5   Final   Hospital Outpatient Visit on 02/13/2017   Component Date Value Ref Range Status    Color 02/13/2017 YELLOW    Final    Appearance 02/13/2017 CLEAR    Final    Specific gravity 02/13/2017 1.009  1.005 - 1.030   Final    pH (UA) 02/13/2017 5.5  5.0 - 8.0   Final    Protein 02/13/2017 NEGATIVE   NEG mg/dL Final    Glucose 02/13/2017 NEGATIVE   NEG mg/dL Final    Ketone 02/13/2017 NEGATIVE   NEG mg/dL Final    Bilirubin 02/13/2017 NEGATIVE   NEG   Final    Blood 02/13/2017 NEGATIVE   NEG   Final    Urobilinogen 02/13/2017 0.2  0.2 - 1.0 EU/dL Final    Nitrites 02/13/2017 NEGATIVE   NEG   Final    Leukocyte Esterase 02/13/2017 NEGATIVE   NEG   Final    Special Requests: 02/13/2017 NO SPECIAL REQUESTS    Final    Culture result: 02/13/2017 NO GROWTH 2 DAYS    Final   Anti-Coag visit on 02/13/2017   Component Date Value Ref Range Status    VALID INTERNAL CONTROL POC 02/13/2017 Yes   Final    INR POC 02/13/2017 2.6   Final   Anti-Coag visit on 02/06/2017   Component Date Value Ref Range Status    VALID INTERNAL CONTROL POC 02/06/2017 Yes   Final    INR POC 02/06/2017 2.2   Final   Hospital Outpatient Visit on 01/18/2017   Component Date Value Ref Range Status    Color 01/18/2017 YELLOW    Final    Appearance 01/18/2017 CLEAR    Final    Specific gravity 01/18/2017 1.013  1.005 - 1.030   Final    pH (UA) 01/18/2017 7.5  5.0 - 8.0   Final    Protein 01/18/2017 NEGATIVE   NEG mg/dL Final    Glucose 01/18/2017 NEGATIVE   NEG mg/dL Final    Ketone 01/18/2017 NEGATIVE   NEG mg/dL Final    Bilirubin 01/18/2017 NEGATIVE   NEG   Final    Blood 01/18/2017 NEGATIVE   NEG   Final    Urobilinogen 01/18/2017 0.2  0.2 - 1.0 EU/dL Final    Nitrites 01/18/2017 NEGATIVE   NEG   Final    Leukocyte Esterase 01/18/2017 NEGATIVE   NEG   Final    WBC 01/18/2017 NEGATIVE   0 - 4 /hpf Final    RBC 01/18/2017 NEGATIVE   0 - 5 /hpf Final    Epithelial cells 01/18/2017 NEGATIVE   0 - 5 /lpf Final    Bacteria 01/18/2017 NEGATIVE   NEG /hpf Final    Prostate Specific Ag 01/18/2017 3.0  0.0 - 4.0 ng/mL Final   Lab Only on 01/18/2017   Component Date Value Ref Range Status    VALID INTERNAL CONTROL POC 01/18/2017 Yes   Final    INR POC 01/18/2017 2.0   Final       .  Results for orders placed or performed in visit on 04/07/17   AMB POC PT/INR   Result Value Ref Range    VALID INTERNAL CONTROL POC Yes     Prothrombin time (POC)  sec    INR POC 1.7        Assessment / Plan      ICD-10-CM ICD-9-CM    1. Warfarin anticoagulation Z79.01 V58.61 AMB POC PT/INR   2. Personal history of venous thrombosis and embolism Z86.718 V12.51 AMB POC PT/INR   3. Acute URI J06.9 465.9      Z Kamran  he was advised to continue his maintenance medications - the Coumadin dose was adjusted due to Zithromax  he is to call if symptoms persist over fove days. he was advised to seek immediate help if the symptoms worsen    Follow-up Disposition:  Return in about 6 months (around 10/7/2017). I asked Shimon Grace if he has any questions and I answered the questions.   Shimon Grace states that he understands the treatment plan and agrees with the treatment plan

## 2017-05-08 RX ORDER — MONTELUKAST SODIUM 10 MG/1
TABLET ORAL
Qty: 90 TAB | Refills: 0 | Status: SHIPPED | OUTPATIENT
Start: 2017-05-08 | End: 2017-08-06 | Stop reason: SDUPTHER

## 2017-05-08 RX ORDER — LISINOPRIL AND HYDROCHLOROTHIAZIDE 12.5; 2 MG/1; MG/1
TABLET ORAL
Qty: 30 TAB | Refills: 0 | Status: SHIPPED | OUTPATIENT
Start: 2017-05-08 | End: 2017-09-07 | Stop reason: ALTCHOICE

## 2017-05-12 ENCOUNTER — ANTI-COAG VISIT (OUTPATIENT)
Dept: INTERNAL MEDICINE CLINIC | Age: 62
End: 2017-05-12

## 2017-05-12 DIAGNOSIS — Z86.718 PERSONAL HISTORY OF VENOUS THROMBOSIS AND EMBOLISM: ICD-10-CM

## 2017-05-12 DIAGNOSIS — Z79.01 WARFARIN ANTICOAGULATION: ICD-10-CM

## 2017-05-12 LAB
INR BLD: 2.2
PT POC: NORMAL SEC
VALID INTERNAL CONTROL?: YES

## 2017-05-12 NOTE — PROGRESS NOTES
Mr. Timur Weinberg is here today for anticoagulation monitoring for the diagnosis of Atrial Fibrillation. His INR goal is 2.0-3.0 and his current Coumadin dose is 82.5 mg weekly. Today's findings include an INR of 2.2 (normal INR range 0.8-1.2). Considering Mr. Winters's past history, todays findings, and per the coumadin policy/protocol, Mr. Newton Smith was instructed to take Coumadin as follows,  Continue same dose. He was also instructed to schedule an appointment in 3 weeks prior to leaving for an INR check. A full discussion of the nature of anticoagulants has been carried out. A full discussion of the need for frequent and regular monitoring, precise dosage adjustment and compliance was stressed. Side effects of potential bleeding were discussed and Mr. Newton Smith was instructed to call [phone] if there are any signs of abnormal bleeding. Mr. Newton Smith was instructed to avoid any OTC items containing aspirin or ibuprofen and prior to starting any new OTC products to consult with his physician or pharmacist to ensure no drug interactions are present. Mr. Newton Smith was instructed to avoid any major changes in his general diet and to avoid alcohol consumption. .    Mr. Newton Smith was provided a literature booklet, \"Treatment with Warfarin (Coumadin)\", that includes topics on understanding coumadin therapy, drug interaction considerations, vitamin K and coumadin use, interactions with foods and supplements containing vitamin K, and the use of herbal products. Mr. Newton Smith verbalized his understanding of all instructions and will call the office with any questions, concerns, or signs of abnormal bleeding or blood clot.

## 2017-05-24 ENCOUNTER — HOSPITAL ENCOUNTER (OUTPATIENT)
Dept: LAB | Age: 62
Discharge: HOME | End: 2017-05-24
Payer: COMMERCIAL

## 2017-05-24 ENCOUNTER — OFFICE VISIT (OUTPATIENT)
Dept: ORTHOPEDIC SURGERY | Facility: CLINIC | Age: 62
End: 2017-05-24

## 2017-05-24 VITALS
TEMPERATURE: 98 F | BODY MASS INDEX: 32.28 KG/M2 | SYSTOLIC BLOOD PRESSURE: 129 MMHG | DIASTOLIC BLOOD PRESSURE: 73 MMHG | WEIGHT: 225 LBS | HEART RATE: 70 BPM

## 2017-05-24 DIAGNOSIS — M54.2 NECK PAIN: ICD-10-CM

## 2017-05-24 DIAGNOSIS — M17.11 ARTHRITIS OF KNEE, RIGHT: Primary | ICD-10-CM

## 2017-05-24 DIAGNOSIS — G89.29 CHRONIC MIDLINE LOW BACK PAIN WITHOUT SCIATICA: ICD-10-CM

## 2017-05-24 DIAGNOSIS — M54.50 CHRONIC MIDLINE LOW BACK PAIN WITHOUT SCIATICA: ICD-10-CM

## 2017-05-24 DIAGNOSIS — M17.12 ARTHRITIS OF KNEE, LEFT: ICD-10-CM

## 2017-05-24 LAB
BASOPHILS # BLD AUTO: 0 K/UL (ref 0–0.06)
BASOPHILS # BLD: 0 % (ref 0–2)
CRP SERPL-MCNC: 0.4 MG/DL (ref 0–0.3)
DIFFERENTIAL METHOD BLD: ABNORMAL
EOSINOPHIL # BLD: 0.1 K/UL (ref 0–0.4)
EOSINOPHIL NFR BLD: 2 % (ref 0–5)
ERYTHROCYTE [DISTWIDTH] IN BLOOD BY AUTOMATED COUNT: 13 % (ref 11.6–14.5)
ERYTHROCYTE [SEDIMENTATION RATE] IN BLOOD: 7 MM/HR (ref 0–20)
HCT VFR BLD AUTO: 41.6 % (ref 36–48)
HGB BLD-MCNC: 14.1 G/DL (ref 13–16)
LYMPHOCYTES # BLD AUTO: 22 % (ref 21–52)
LYMPHOCYTES # BLD: 1.4 K/UL (ref 0.9–3.6)
MCH RBC QN AUTO: 32.3 PG (ref 24–34)
MCHC RBC AUTO-ENTMCNC: 33.9 G/DL (ref 31–37)
MCV RBC AUTO: 95.2 FL (ref 74–97)
MONOCYTES # BLD: 0.6 K/UL (ref 0.05–1.2)
MONOCYTES NFR BLD AUTO: 10 % (ref 3–10)
NEUTS SEG # BLD: 4.1 K/UL (ref 1.8–8)
NEUTS SEG NFR BLD AUTO: 66 % (ref 40–73)
PLATELET # BLD AUTO: 168 K/UL (ref 135–420)
PMV BLD AUTO: 11.3 FL (ref 9.2–11.8)
RBC # BLD AUTO: 4.37 M/UL (ref 4.7–5.5)
RHEUMATOID FACT SER QL LA: NEGATIVE
WBC # BLD AUTO: 6.2 K/UL (ref 4.6–13.2)

## 2017-05-24 PROCEDURE — 86038 ANTINUCLEAR ANTIBODIES: CPT | Performed by: ORTHOPAEDIC SURGERY

## 2017-05-24 PROCEDURE — 36415 COLL VENOUS BLD VENIPUNCTURE: CPT | Performed by: ORTHOPAEDIC SURGERY

## 2017-05-24 PROCEDURE — 85652 RBC SED RATE AUTOMATED: CPT | Performed by: ORTHOPAEDIC SURGERY

## 2017-05-24 PROCEDURE — 86140 C-REACTIVE PROTEIN: CPT | Performed by: ORTHOPAEDIC SURGERY

## 2017-05-24 PROCEDURE — 85025 COMPLETE CBC W/AUTO DIFF WBC: CPT | Performed by: ORTHOPAEDIC SURGERY

## 2017-05-24 PROCEDURE — 86430 RHEUMATOID FACTOR TEST QUAL: CPT | Performed by: ORTHOPAEDIC SURGERY

## 2017-05-24 RX ORDER — LIDOCAINE HYDROCHLORIDE 10 MG/ML
6 INJECTION INFILTRATION; PERINEURAL ONCE
Qty: 6 ML | Refills: 0
Start: 2017-05-24 | End: 2017-05-24

## 2017-05-24 RX ORDER — TRIAMCINOLONE ACETONIDE 40 MG/ML
60 INJECTION, SUSPENSION INTRA-ARTICULAR; INTRAMUSCULAR ONCE
Qty: 2 ML | Refills: 0
Start: 2017-05-24 | End: 2017-05-24

## 2017-05-24 NOTE — PROGRESS NOTES
Patient: Lian Reeder                MRN: [id number]       SSN: [id number]  YOB: 1955        AGE: 57 y.o. SEX: male  Body mass index is 32.28 kg/(m^2). PCP: Chuy Hansen MD  05/24/17      Chief Complaint   Patient presents with    Knee Pain     Right       HISTORY OF PRESENT ILLNESS: Lian Reeder is a 57 y.o. male who presents to the office for flare up of right knee pain. He accidentally struck the knee against a car door. He however is concerned about multiple joints hurting and that he may have rheumatoid arthritis . He states his hands, neck and back hurts. He states the injections in march made a significant improvement and the left knee is still doing well. Review of Systems   Constitutional: Negative. HENT: Negative. Eyes: Negative. Respiratory: Negative. Cardiovascular: Negative. Gastrointestinal: Negative. Genitourinary: Negative. Musculoskeletal: Positive for joint pain (right knee). Skin: Negative. Neurological: Negative. Endo/Heme/Allergies: Negative. Psychiatric/Behavioral: Negative. Social History     Social History    Marital status:      Spouse name: N/A    Number of children: N/A    Years of education: N/A     Occupational History    Not on file.      Social History Main Topics    Smoking status: Never Smoker    Smokeless tobacco: Never Used    Alcohol use No    Drug use: No    Sexual activity: Not Currently     Other Topics Concern    Not on file     Social History Narrative        Past Medical History:   Diagnosis Date    Arthritis     Bleeding     Blood clot in the legs     Esophageal reflux     Headache     migraine    High cholesterol     Hypertension     Lower back pain 11/6/2010    Other chest pain     Personal history of venous thrombosis and embolism     Pure hypercholesterolemia     Right buttock pain 11/6/2010    Right foot pain     Spinal stenosis     Tendonitis, tibialis anterior    Thromboembolus (HCC)         Allergies   Allergen Reactions    Augmentin [Amoxicillin-Pot Clavulanate] Itching    Penicillins Rash         Current Outpatient Prescriptions   Medication Sig    montelukast (SINGULAIR) 10 mg tablet TAKE 1 TABLET BY MOUTH EVERY DAY    lisinopril-hydroCHLOROthiazide (PRINZIDE, ZESTORETIC) 20-12.5 mg per tablet TAKE 1 TABLET BY MOUTH DAILY    lisinopril-hydroCHLOROthiazide (PRINZIDE, ZESTORETIC) 20-12.5 mg per tablet TAKE 1 TABLET BY MOUTH DAILY    guaiFENesin-codeine (CHERATUSSIN AC) 100-10 mg/5 mL solution 1 or 2 tsp q 6 hrs prn cough    butalbital-acetaminophen-caff (FIORICET) -40 mg per capsule Take 2 capsules every 8 hours as needed for headache    raNITIdine (ZANTAC) 150 mg tablet TK 1 T PO HS    warfarin (COUMADIN) 5 mg tablet TAKE 2 AND 1/2 TABLETS BY MOUTH DAILY OR AS DIRECTED    lisinopril-hydroCHLOROthiazide (PRINZIDE, ZESTORETIC) 20-12.5 mg per tablet TAKE 1 TABLET BY MOUTH DAILY    acetaminophen (TYLENOL) 500 mg tablet Take  by mouth every six (6) hours as needed for Pain.  lansoprazole (PREVACID) 30 mg capsule Take 1 Cap by mouth Daily (before breakfast).  labetalol (NORMODYNE) 100 mg tablet TAKE 1 TABLET BY MOUTH TWICE DAILY. No current facility-administered medications for this visit. Physical Exam  Constitutional: he is oriented to person, place, and time and well-developed, well-nourished, and in no distress. No distress. HENT:   Head: Normocephalic and atraumatic. Right Ear: Hearing normal.   Left Ear: Hearing normal.   Nose: Nose normal.   Eyes: Conjunctivae, EOM and lids are normal. Pupils are equal, round, and reactive to light. Neck: Trachea normal.   Pulmonary/Chest: Effort normal and breath sounds normal. No respiratory distress. Abdominal: Soft. Neurological: he is alert and oriented to person, place, and time. Skin: Skin is warm, dry and intact. he is not diaphoretic.    Psychiatric: Affect normal. Nursing note and vitals reviewed. Ortho Exam   Right knee shows tenderness over the proximal lateral tibia at the joint line or redness or warmth was not noted. There is no effusion. ROM is normal and stable as before. Procedure: After timeout and under sterile conditions, patient's right knee was injected with 6 cc of Xylocaine and 1.5 cc of Kenalog. [facility] PROCEDURE PROGRESS NOTE        Chart reviewed for the following:   Neda Montalvo MD, have reviewed the History, Physical and updated the Allergic reactions for [address] [place] [address] performed immediately prior to start of procedure:   Neda Montalvo MD, have performed the [facility] the [facility] procedure:            * Patient was identified by name and date of birth   * Agreement on procedure being performed was verified  * Risks and Benefits explained to the patient  * Procedure site verified and marked as necessary  * Patient was positioned for comfort  * Consent was signed and verified     Time: 10:22 AM        Date of procedure: 5/24/2017    Procedure performed by: Paul Matthew MD    Provider assisted by: None     Patient assisted by: self    How tolerated by patient: tolerated the procedure well with no complications    Comments: none      RADIOGRAPHS:   No new XR's taken today. IMPRESSION & PLAN: I feel the knee pain is  A flare up of his OA due to the injury. Due to the multiple joint complaints we will perform a blood test to see if RA could be a possibility. We will perform a cortisone injection for the right knee. He will call us next week to inform us of his blood work and otherwise will follow up as needed. Written by [facility], as dictated by Dr. Jessika Park, Dr. Paul Matthew, confirm that all documentation is accurate.

## 2017-05-25 LAB — ANA TITR SER IF: NEGATIVE {TITER}

## 2017-05-30 ENCOUNTER — OFFICE VISIT (OUTPATIENT)
Dept: INTERNAL MEDICINE CLINIC | Age: 62
End: 2017-05-30

## 2017-05-30 VITALS
OXYGEN SATURATION: 99 % | RESPIRATION RATE: 16 BRPM | WEIGHT: 222 LBS | BODY MASS INDEX: 31.78 KG/M2 | SYSTOLIC BLOOD PRESSURE: 128 MMHG | HEIGHT: 70 IN | TEMPERATURE: 98.8 F | DIASTOLIC BLOOD PRESSURE: 84 MMHG | HEART RATE: 82 BPM

## 2017-05-30 DIAGNOSIS — I10 ESSENTIAL HYPERTENSION: Primary | ICD-10-CM

## 2017-05-30 DIAGNOSIS — M15.9 OSTEOARTHRITIS INVOLVING MULTIPLE JOINTS ON BOTH SIDES OF BODY: ICD-10-CM

## 2017-05-30 NOTE — PROGRESS NOTES
1. Have you been to the ER, urgent care clinic since your last visit? Hospitalized since your last visit? No    2. Have you seen or consulted any other health care providers outside of the [address] [place] [address] since your last visit? Include any pap smears or colon screening.  No

## 2017-05-31 NOTE — PROGRESS NOTES
The patient presents to the office today with the chief complaint of joint pain    HPI    The patient remains on medications for hypertension and anticoagulation due to chronic DVT. The patient is doing ok on the medications but he complains of pain and stiffness in his low back, hips and knees. The patient is status pos a right knee injection recently which has been helpful. The patient is status post lab work with negative tests for Rheumatoid Arthritis Or Lupus      Review of Systems   Respiratory: Negative for shortness of breath. Cardiovascular: Negative for chest pain and leg swelling. Musculoskeletal: Positive for joint pain. Allergies   Allergen Reactions    Augmentin [Amoxicillin-Pot Clavulanate] Itching    Penicillins Rash       Current Outpatient Prescriptions   Medication Sig Dispense Refill    montelukast (SINGULAIR) 10 mg tablet TAKE 1 TABLET BY MOUTH EVERY DAY 90 Tab 0    lisinopril-hydroCHLOROthiazide (PRINZIDE, ZESTORETIC) 20-12.5 mg per tablet TAKE 1 TABLET BY MOUTH DAILY 30 Tab 0    butalbital-acetaminophen-caff (FIORICET) -40 mg per capsule Take 2 capsules every 8 hours as needed for headache 540 Cap 3    raNITIdine (ZANTAC) 150 mg tablet TK 1 T PO HS  5    warfarin (COUMADIN) 5 mg tablet TAKE 2 AND 1/2 TABLETS BY MOUTH DAILY OR AS DIRECTED 75 Tab 5    acetaminophen (TYLENOL) 500 mg tablet Take  by mouth every six (6) hours as needed for Pain.  lansoprazole (PREVACID) 30 mg capsule Take 1 Cap by mouth Daily (before breakfast). 90 Cap 3    labetalol (NORMODYNE) 100 mg tablet TAKE 1 TABLET BY MOUTH TWICE DAILY.  180 Tab 4       Past Medical History:   Diagnosis Date    Arthritis     Bleeding     Blood clot in the legs     Esophageal reflux     Headache     migraine    High cholesterol     Hypertension     Lower back pain 11/6/2010    Other chest pain     Personal history of venous thrombosis and embolism     Pure hypercholesterolemia     Right buttock pain 11/6/2010    Right foot pain     Spinal stenosis     Tendonitis, tibialis     anterior    Thromboembolus (HCC)        Past Surgical History:   Procedure Laterality Date   [facility]  06-25-12    Right foot with excision of bursa and adipose tissue from fifth metatarsal base by Dr. Ealre Alford      lower back (1992 and 2000)    HX OTHER SURGICAL      left foot (2008)    LAMINOTOMY      NERVE BLOCK         Social History     Social History    Marital status:      Spouse name: N/A    Number of children: N/A    Years of education: N/A     Occupational History    Not on file. Social History Main Topics    Smoking status: Never Smoker    Smokeless tobacco: Never Used    Alcohol use No    Drug use: No    Sexual activity: Not Currently     Other Topics Concern    Not on file     Social History Narrative       Patient does not have an advanced directive on file    Visit Vitals    /84 (BP 1 Location: Left arm, BP Patient Position: Sitting)    Pulse 82    Temp 98.8 °F (37.1 °C) (Tympanic)    Resp 16    Ht 5' 10\" (1.778 m)    Wt 222 lb (100.7 kg)    SpO2 99%    BMI 31.85 kg/m2       Physical Exam   Neck: Carotid bruit is not present. Cardiovascular: Normal rate and regular rhythm. Exam reveals no gallop. No murmur heard. Abdominal: Soft. He exhibits no distension. There is no tenderness. Musculoskeletal: He exhibits no edema. BMI:  I have reviewed/discussed the above normal BMI with the patient. I have recommended the following interventions: dietary management education, guidance, and counseling . [facility] Outpatient Visit on 05/24/2017   Component Date Value Ref Range Status    WBC 05/24/2017 6.2  4.6 - 13.2 K/uL Final    RBC 05/24/2017 4.37* 4.70 - 5.50 M/uL Final    HGB 05/24/2017 14.1  13.0 - 16.0 g/dL Final    HCT 05/24/2017 41.6  36.0 - 48.0 % Final    MCV 05/24/2017 95.2  74.0 - 97.0 FL Final    MCH 05/24/2017 32.3 24.0 - 34.0 PG Final    MCHC 05/24/2017 33.9  31.0 - 37.0 g/dL Final    RDW 05/24/2017 13.0  11.6 - 14.5 % Final    PLATELET 40/09/1785 525  135 - 420 K/uL Final    MPV 05/24/2017 11.3  9.2 - 11.8 FL Final    NEUTROPHILS 05/24/2017 66  40 - 73 % Final    LYMPHOCYTES 05/24/2017 22  21 - 52 % Final    MONOCYTES 05/24/2017 10  3 - 10 % Final    EOSINOPHILS 05/24/2017 2  0 - 5 % Final    BASOPHILS 05/24/2017 0  0 - 2 % Final    ABS. NEUTROPHILS 05/24/2017 4.1  1.8 - 8.0 K/UL Final    ABS. LYMPHOCYTES 05/24/2017 1.4  0.9 - 3.6 K/UL Final    ABS. MONOCYTES 05/24/2017 0.6  0.05 - 1.2 K/UL Final    ABS. EOSINOPHILS 05/24/2017 0.1  0.0 - 0.4 K/UL Final    ABS. BASOPHILS 05/24/2017 0.0  0.0 - 0.06 K/UL Final    DF 05/24/2017 AUTOMATED    Final    Sed rate, automated 05/24/2017 7  0 - 20 mm/hr Final    C-Reactive protein 05/24/2017 0.4* 0 - 0.3 mg/dL Final    Antinuclear Abs, IFA 05/24/2017 NEGATIVE     Final    Comment: (NOTE)                                      Negative   <1:80                                      Borderline  1:80                                      Positive   >1:80  Performed At: [address] [id number]  Bg Mason MD [id number]      RA Latex, Ql. 05/24/2017 NEGATIVE   NEG   Final   Anti-Coag visit on 05/12/2017   Component Date Value Ref Range Status    VALID INTERNAL CONTROL POC 05/12/2017 Yes   Final    INR POC 05/12/2017 2.2   Final   Office Visit on 04/07/2017   Component Date Value Ref Range Status    VALID INTERNAL CONTROL POC 04/07/2017 Yes   Final    INR POC 04/07/2017 1.7   Final   Anti-Coag visit on 03/14/2017   Component Date Value Ref Range Status    VALID INTERNAL CONTROL POC 03/14/2017 Yes   Final    INR POC 03/14/2017 2.5   Final       .No results found for any visits on 05/30/17. Assessment / Plan      ICD-10-CM ICD-9-CM    1. Essential hypertension I10 401.9    2.  Osteoarthritis involving multiple joints on both sides of body M15.9 715.89      he was advised to continue his maintenance medications  Consider a course of Prednisone if the joints worsen    Follow-up Disposition:  Return in about 6 months (around 11/30/2017). I asked Baby Jacober if he has any questions and I answered the questions.   Baby Presser states that he understands the treatment plan and agrees with the treatment plan

## 2017-06-06 RX ORDER — LISINOPRIL AND HYDROCHLOROTHIAZIDE 12.5; 2 MG/1; MG/1
TABLET ORAL
Qty: 30 TAB | Refills: 0 | Status: SHIPPED | OUTPATIENT
Start: 2017-06-06 | End: 2017-06-08 | Stop reason: SDUPTHER

## 2017-06-08 ENCOUNTER — OFFICE VISIT (OUTPATIENT)
Dept: ORTHOPEDIC SURGERY | Age: 62
End: 2017-06-08

## 2017-06-08 VITALS
SYSTOLIC BLOOD PRESSURE: 127 MMHG | RESPIRATION RATE: 12 BRPM | DIASTOLIC BLOOD PRESSURE: 74 MMHG | HEART RATE: 70 BPM | HEIGHT: 70 IN | TEMPERATURE: 97.8 F | WEIGHT: 221 LBS | BODY MASS INDEX: 31.64 KG/M2

## 2017-06-08 DIAGNOSIS — Z86.718 HISTORY OF DVT (DEEP VEIN THROMBOSIS): ICD-10-CM

## 2017-06-08 DIAGNOSIS — M79.18 MYOFASCIAL PAIN: ICD-10-CM

## 2017-06-08 DIAGNOSIS — M51.36 DEGENERATION OF LUMBAR INTERVERTEBRAL DISC: Primary | ICD-10-CM

## 2017-06-08 DIAGNOSIS — M47.816 SPONDYLOSIS OF LUMBAR REGION WITHOUT MYELOPATHY OR RADICULOPATHY: ICD-10-CM

## 2017-06-08 DIAGNOSIS — M62.838 MUSCLE SPASM: ICD-10-CM

## 2017-06-08 RX ORDER — FLUTICASONE PROPIONATE 50 MCG
2 SPRAY, SUSPENSION (ML) NASAL DAILY
COMMUNITY
Start: 2017-04-07 | End: 2017-09-07 | Stop reason: SDUPTHER

## 2017-06-08 NOTE — PROGRESS NOTES
[facility]  [address] [employer], [facility] [address]  Phone: [phone]  Fax: [phone]      ASSESSMENT   Wilfredo Peralta was seen today for back pain and follow-up. Diagnoses and all orders for this visit:    Degeneration of lumbar intervertebral disc    Spondylosis of lumbar region without myelopathy or radiculopathy    Muscle spasm    History of DVT (deep vein thrombosis)  -     Cancel: Generic Supply Order  -     Generic Supply Order    Myofascial pain         IMPRESSION AND PLAN:  Erik Leos is a 57 y.o. male with history of cervical and lumbar pain. Pt is taking Skelaxin 800 mg with relief of pain. 1) Pt was given information on lumbar arthritis exercises. 2) Order written for compression stockings. Patient has history of DVT. 3) Patient will continue with Skelaxin 800 mg and does not need a refill at this time. 4) Mr. Elisa Patrick has a reminder for a \"due or due soon\" health maintenance. I have asked that he contact his primary care provider, Shine Taylor MD, for follow-up on this health maintenance. 5)  demonstrated consistency with prescribing. 6) Last UDS from 01/24/2017 was consistent. 7) Pt will follow-up in 3 months. HISTORY OF PRESENT ILLNESS:  Erik Leos is a 57 y.o. male with history of cervical and lumbar pain. Pt is taking Skelaxin 800 mg with relief of pain. He reports there was an issue with his order for compression stockings. Pt states he has been wearing compression stocking on his right leg since 1994. He reports a history of blood clots in his right leg following surgery and reports occasional lower leg swelling. Of note, patient reports he will be having left shoulder surgery with Dr. Abby Stanton. He states he recently had a steroid injection in the joint with good relief of pain. Pt at this time desires to continue with current care.     Pain Scale: 2/10    PCP: Shine Taylor MD       Past Medical History:   Diagnosis Date    Arthritis     Bleeding     Blood clot in the legs     Esophageal reflux     Headache     migraine    High cholesterol     Hypertension     Lower back pain 11/6/2010    Other chest pain     Personal history of venous thrombosis and embolism     Pure hypercholesterolemia     Right buttock pain 11/6/2010    Right foot pain     Spinal stenosis     Tendonitis, tibialis     anterior    Thromboembolus ([facility] [address]         Social History     Social History    Marital status:      Spouse name: N/A    Number of children: N/A    Years of education: N/A     Occupational History    Not on file. Social History Main Topics    Smoking status: Never Smoker    Smokeless tobacco: Never Used    Alcohol use No    Drug use: No    Sexual activity: Not Currently     Other Topics Concern    Not on file     Social History Narrative       Current Outpatient Prescriptions   Medication Sig Dispense Refill    fluticasone (FLONASE) 50 mcg/actuation nasal spray 2 Sprays by Both Nostrils route daily.  montelukast (SINGULAIR) 10 mg tablet TAKE 1 TABLET BY MOUTH EVERY DAY 90 Tab 0    lisinopril-hydroCHLOROthiazide (PRINZIDE, ZESTORETIC) 20-12.5 mg per tablet TAKE 1 TABLET BY MOUTH DAILY 30 Tab 0    butalbital-acetaminophen-caff (FIORICET) -40 mg per capsule Take 2 capsules every 8 hours as needed for headache 540 Cap 3    raNITIdine (ZANTAC) 150 mg tablet TK 1 T PO HS  5    warfarin (COUMADIN) 5 mg tablet TAKE 2 AND 1/2 TABLETS BY MOUTH DAILY OR AS DIRECTED 75 Tab 5    acetaminophen (TYLENOL) 500 mg tablet Take  by mouth every six (6) hours as needed for Pain.  lansoprazole (PREVACID) 30 mg capsule Take 1 Cap by mouth Daily (before breakfast). 90 Cap 3    labetalol (NORMODYNE) 100 mg tablet TAKE 1 TABLET BY MOUTH TWICE DAILY.  180 Tab 4       Allergies   Allergen Reactions    Augmentin [Amoxicillin-Pot Clavulanate] Itching    Penicillins Rash         REVIEW OF SYSTEMS    Constitutional: Negative for fever, chills, or weight change. Respiratory: Negative for cough or shortness of breath. Cardiovascular: Negative for chest pain or palpitations. Gastrointestinal: Negative for acid reflux, change in bowel habits, or constipation. Genitourinary: Negative for dysuria and flank pain. Musculoskeletal: Positive for cervical pain and muscle spasm  Skin: Negative for rash. Neurological: Negative for dizziness or numbness. Positive for headaches. Endo/Heme/Allergies: Negative for increased bruising. Psychiatric/Behavioral: Negative for difficulty with sleep. PHYSICAL EXAMINATION  Visit Vitals    /74    Pulse 70    Temp 97.8 °F (36.6 °C) (Oral)    Resp 12    Ht 5' 10\" (1.778 m)    Wt 221 lb (100.2 kg)    BMI 31.71 kg/m2       Constitutional: Awake, alert, and in no acute distress  Neurological: 1+ symmetrical DTRs in the upper extremities. 1+ symmetrical DTRs in the lower extremities. Sensation to light touch is intact. Negative Eliecer's sign bilaterally. Skin: warm, dry, and intact. Musculoskeletal: Minimal tenderness to palpation in the lower lumbar region. No pain with extension, axial loading, or forward flexion. No pain with internal or external rotation of his hips. Negative straight leg raise bilaterally. Good ROM in left shoulder. Biceps  Triceps Deltoids Wrist Ext Wrist Flex Hand Intrin   Right +4/5 +4/5 +4/5 +4/5 +4/5 +4/5   Left +4/5 +4/5 +4/5 +4/5 +4/5 +4/5      Hip Flex  Quads Hamstrings Ankle DF EHL Ankle PF   Right +4/5 +4/5 +4/5 +4/5 +4/5 +4/5   Left +4/5 +4/5 +4/5 +4/5 +4/5 +4/5       IMAGING:    Cervical Spine MRI from 12/19/2016 was personally reviewed with the Pt and demonstrated:     Results from Hospital Encounter encounter on 12/19/16   MRI CERV SPINE WO CONT    Narrative MRI of cervical spine without contrast    HISTORY: Chronic progressive neck pain with headaches. COMPARISON: No prior MRI.  Cervical spine radiographs from 12/1/2016. TECHNIQUE: T1 weighted, T2 FSE, FSE inversion recovery sagittal images are  supplemented by T2 weighted and GRE/medic axial images. FINDINGS: Normal alignment and vertebral body heights. Normal marrow signal  except for mild endplate degenerative change. Cervical cord is normal in signal  and caliber. Visualized posterior fossa contents look normal. Paraspinal soft  tissues look normal.    C2-3: No significant degenerative disc disease or spinal stenosis. C3-4: Small nonfocal disc protrusion which contacts the ventral cord and causes  borderline spinal stenosis. No foraminal stenosis. C4-5: No significant degenerative disc disease. Mildly hypertrophic facets. No  spinal stenosis. C5-6: Disc is narrowed with diffusely bulging disc and osteophyte complex. Facets are mildly hypertrophic. Mild spinal canal and moderate left foraminal  stenoses. C6-7: Disc is narrowed with diffusely bulging disc and osteophyte complex. Facets are mildly hypertrophic. Mild spinal canal and moderate bilateral  foraminal stenoses. C7-T1: No significant degenerative disc disease or spinal stenosis.          Impression Impression:    Disc osteophyte complexes causing mild spinal canal stenoses at C5-6 and C6-7. Moderate left foraminal stenosis at C5-6 and moderate bilateral foraminal  stenoses at C6-7.           LumbarSpine MRI from 1/23/2013 was personally reviewed with the Pt and demonstrated:    Final result (Exam End: 1/23/2013  6:57 PM) Open     Study Result   MRI lumbar spine with and without contrast      Comparison: August 1, 2011      Clinical information: History of prior back surgery, now with right-sided pain.      Procedure:      MRI of the lumbar spine was performed prior to and following the uneventful  administration of 20 cc of gadolinium intravenously.  Sequences included:  Sagittal T1, sagittal T1 postcontrast, sagittal inversion recovery, sagittal  T2, axial T1, axial T1 postcontrast, and axial T2 with fat saturation.      Findings:      Vertebral body heights are maintained. There are mild degenerative endplate  changes at X0-K5. No evidence for fracture. Alignment is anatomic, without  listhesis.     There is mild disc narrowing at L4-5 and moderate disc narrowing at L5-S1 with  desiccation.      The conus terminates at the L1 level.      Susceptibility artifact present in the soft tissues of the lower back  consistent with prior surgical intervention.      Correlation of axial and sagittal images reveals the following:      At L1-L2: No significant disc pathology or proliferative changes. No central  canal or foraminal stenosis.     At L2-L3: No significant disc pathology or proliferative changes. No central  canal or foraminal stenosis.     At L3-L4: Mild bulging of the posterolateral disc corners. Mild facet  arthropathy and ligamentous buckling. The canal is patent. Mild right greater  than left foraminal narrowing. Minimal progression.      At L4-L5: Prior left hemilaminectomy. Mild circumferential disc bulge,  eccentric along the posterior right disc margin. Mild facet arthropathy. Moderate right ligamentous hypertrophy. There is mild narrowing of the lateral  recesses. Mild central canal narrowing. Moderate right and mild left foraminal  narrowing. Findings look overall unchanged.      At L5-S1: Prior right hemilaminectomies. Mild posterior disc bulge/osteophyte  complex. Moderate facet arthropathy. Canal is patent. Moderate bilateral  foraminal narrowing. Findings look stable.      Visualized portions of the sacroiliac joints are unremarkable. Incidentally  imaged retroperitoneal structures are unremarkable as well.         IMPRESSION:  Postsurgical change at L4-5 and L5-S1.  Central canal narrowing at L4-5 and  foraminal stenosis at L4-5/L5-S1 appear stable when compared to prior study.      Minimal progression of mild degenerative changes at L3-4.     Written by Nguyen Lindsay, as dictated by Jeannie Villalta MD.  I, Dr. Jeannie Villalta confirm that all documentation is accurate.

## 2017-06-08 NOTE — PATIENT INSTRUCTIONS
Low Back Arthritis: Exercises  Your Care Instructions  Here are some examples of typical rehabilitation exercises for your condition. Start each exercise slowly. Ease off the exercise if you start to have pain. Your doctor or physical therapist will tell you when you can start these exercises and which ones will work best for you. When you are not being active, find a comfortable position for rest. Some people are comfortable on the floor or a medium-firm bed with a small pillow under their head and another under their knees. Some people prefer to lie on their side with a pillow between their knees. Don't stay in one position for too long. Take short walks (10 to 20 minutes) every 2 to 3 hours. Avoid slopes, hills, and stairs until you feel better. Walk only distances you can manage without pain, especially leg pain. How to do the exercises  Pelvic tilt    1. Lie on your back with your knees bent. 2. \"Brace\" your stomach--tighten your muscles by pulling in and imagining your belly button moving toward your spine. 3. Press your lower back into the floor. You should feel your hips and pelvis rock back. 4. Hold for 6 seconds while breathing smoothly. 5. Relax and allow your pelvis and hips to rock forward. 6. Repeat 8 to 12 times. Back stretches    1. Get down on your hands and knees on the floor. 2. Relax your head and allow it to droop. Round your back up toward the ceiling until you feel a nice stretch in your upper, middle, and lower back. Hold this stretch for as long as it feels comfortable, or about 15 to 30 seconds. 3. Return to the starting position with a flat back while you are on your hands and knees. 4. Let your back sway by pressing your stomach toward the floor. Lift your buttocks toward the ceiling. 5. Hold this position for 15 to 30 seconds. 6. Repeat 2 to 4 times. Follow-up care is a key part of your treatment and safety.  Be sure to make and go to all appointments, and call your doctor if you are having problems. It's also a good idea to know your test results and keep a list of the medicines you take. Where can you learn more? Go to http://rivka-jarrell.info/. Enter T361 in the search box to learn more about \"Low Back Arthritis: Exercises. \"  Current as of: May 23, 2016  Content Version: 11.2  © 1423-7090 [employer]. Care instructions adapted under license by [employer] (which disclaims liability or warranty for this information). If you have questions about a medical condition or this instruction, always ask your healthcare professional. [place] [address] any warranty or liability for your use of this information.

## 2017-06-08 NOTE — MR AVS SNAPSHOT
Visit Information Date & Time Provider Department Dept. Phone Encounter #  
 6/8/2017  3:30 PM Deirdre Garvey MD [facility] [phone] Follow-up Instructions Return in about 3 months (around 9/8/2017) for Medication follow up. Upcoming Health Maintenance Date Due Hepatitis C Screening 1955 ZOSTER VACCINE AGE 60> 4/13/2013 INFLUENZA AGE 9 TO ADULT 8/1/2017 DTaP/Tdap/Td series (2 - Td) 12/6/2024 COLONOSCOPY 5/27/2026 Allergies as of 6/8/2017  Review Complete On: 6/8/2017 By: Deirdre Garvey MD  
  
 Severity Noted Reaction Type Reactions Augmentin [Amoxicillin-pot Clavulanate]    Itching Penicillins    Rash Current Immunizations  Reviewed on 5/26/2017 Name Date Tdap 12/6/2014 Not reviewed this visit You Were Diagnosed With   
  
 Codes Comments History of DVT (deep vein thrombosis)    -  Primary ICD-10-CM: W90.658 ICD-9-CM: V12.51 Degeneration of lumbar intervertebral disc     ICD-10-CM: M51.36 
ICD-9-CM: 722.52 Spondylosis of lumbar region without myelopathy or radiculopathy     ICD-10-CM: M47.816 ICD-9-CM: 208. 3 Myofascial pain     ICD-10-CM: M79.1 ICD-9-CM: 729.1 Muscle spasm     ICD-10-CM: N79.597 ICD-9-CM: 728.85 Vitals BP Pulse Temp Resp Height(growth percentile) Weight(growth percentile) 127/74 70 97.8 °F (36.6 °C) (Oral) 12 5' 10\" (1.778 m) 221 lb (100.2 kg) BMI Smoking Status 31.71 kg/m2 Never Smoker BMI and BSA Data Body Mass Index Body Surface Area 31.71 kg/m 2 2.22 m 2 Preferred Pharmacy Pharmacy Name Phone [facility] Jameson Wilkinson [address] [place] [address] [phone] Your Updated Medication List  
  
   
This list is accurate as of: 6/8/17  5:22 PM.  Always use your most recent med list.  
  
  
  
  
 acetaminophen 500 mg tablet Commonly known as:  TYLENOL Take  by mouth every six (6) hours as needed for Pain. butalbital-acetaminophen-caff -40 mg per capsule Commonly known as:  [employer] Take 2 capsules every 8 hours as needed for headache  
  
 fluticasone 50 mcg/actuation nasal spray Commonly known as:  Ailyn Najjar 2 Sprays by Both Nostrils route daily. labetalol 100 mg tablet Commonly known as:  NORMODYNE  
TAKE 1 TABLET BY MOUTH TWICE DAILY. lansoprazole 30 mg capsule Commonly known as:  PREVACID Take 1 Cap by mouth Daily (before breakfast). lisinopril-hydroCHLOROthiazide 20-12.5 mg per tablet Commonly known as:  PRINZIDE, ZESTORETIC  
TAKE 1 TABLET BY MOUTH DAILY  
  
 montelukast 10 mg tablet Commonly known as:  SINGULAIR  
TAKE 1 TABLET BY MOUTH EVERY DAY  
  
 raNITIdine 150 mg tablet Commonly known as:  ZANTAC TK 1 T PO HS  
  
 warfarin 5 mg tablet Commonly known as:  COUMADIN  
TAKE 2 AND 1/2 TABLETS BY MOUTH DAILY OR AS DIRECTED We Performed the Following AMB SUPPLY ORDER [[id number] Custom] Comments:  
 2 pair Knee high calf 4 medical hose 30-40 MM/HG [employer] Follow-up Instructions Return in about 3 months (around 9/8/2017) for Medication follow up. Patient Instructions Low Back Arthritis: Exercises Your Care Instructions Here are some examples of typical rehabilitation exercises for your condition. Start each exercise slowly. Ease off the exercise if you start to have pain. Your doctor or physical therapist will tell you when you can start these exercises and which ones will work best for you. When you are not being active, find a comfortable position for rest. Some people are comfortable on the floor or a medium-firm bed with a small pillow under their head and another under their knees. Some people prefer to lie on their side with a pillow between their knees. Don't stay in one position for too long. Take short walks (10 to 20 minutes) every 2 to 3 hours. Avoid slopes, hills, and stairs until you feel better. Walk only distances you can manage without pain, especially leg pain. How to do the exercises Pelvic tilt 1. Lie on your back with your knees bent. 2. \"Brace\" your stomachtighten your muscles by pulling in and imagining your belly button moving toward your spine. 3. Press your lower back into the floor. You should feel your hips and pelvis rock back. 4. Hold for 6 seconds while breathing smoothly. 5. Relax and allow your pelvis and hips to rock forward. 6. Repeat 8 to 12 times. Back stretches 1. Get down on your hands and knees on the floor. 2. Relax your head and allow it to droop. Round your back up toward the ceiling until you feel a nice stretch in your upper, middle, and lower back. Hold this stretch for as long as it feels comfortable, or about 15 to 30 seconds. 3. Return to the starting position with a flat back while you are on your hands and knees. 4. Let your back sway by pressing your stomach toward the floor. Lift your buttocks toward the ceiling. 5. Hold this position for 15 to 30 seconds. 6. Repeat 2 to 4 times. Follow-up care is a key part of your treatment and safety. Be sure to make and go to all appointments, and call your doctor if you are having problems. It's also a good idea to know your test results and keep a list of the medicines you take. Where can you learn more? Go to http://rivka-jarrell.info/. Enter G942 in the search box to learn more about \"Low Back Arthritis: Exercises. \" Current as of: May 23, 2016 Content Version: 11.2 © 2367-7774 [employer]. Care instructions adapted under license by [employer] (which disclaims liability or warranty for this information).  If you have questions about a medical condition or this instruction, always ask your healthcare professional. [employer] disclaims any warranty or liability for your use of this information. Introducing [facility]! Natalio Diallo introduces [employer] patient portal. Now you can access parts of your medical record, email your doctor's office, and request medication refills online. 1. In your internet browser, go to [email or web address] 2. Click on the First Time User? Click Here link in the Sign In box. You will see the New Member Sign Up page. 3. Enter your [employer] Access Code exactly as it appears below. You will not need to use this code after youve completed the sign-up process. If you do not sign up before the expiration date, you must request a new code. · [employer] Access Code: [email or web address] Expires: 6/24/2017  4:28 PM 
 
4. Enter the last four digits of your Social Security Number (xxxx) and Date of Birth (mm/dd/yyyy) as indicated and click Submit. You will be taken to the next sign-up page. 5. Create a [employer] ID. This will be your [employer] login ID and cannot be changed, so think of one that is secure and easy to remember. 6. Create a [employer] password. You can change your password at any time. 7. Enter your Password Reset Question and Answer. This can be used at a later time if you forget your password. 8. Enter your e-mail address. You will receive e-mail notification when new information is available in [address] 9. Click Sign Up. You can now view and download portions of your medical record. 10. Click the Download Summary menu link to download a portable copy of your medical information. If you have questions, please visit the Frequently Asked Questions section of the [employer] website. Remember, [employer] is NOT to be used for urgent needs. For medical emergencies, dial 911. Now available from your iPhone and Android! Please provide this summary of care documentation to your next provider. Your primary care clinician is listed as Eduardo Carlos. If you have any questions after today's visit, please call [phone].

## 2017-07-14 ENCOUNTER — OFFICE VISIT (OUTPATIENT)
Dept: INTERNAL MEDICINE CLINIC | Age: 62
End: 2017-07-14

## 2017-07-14 VITALS
WEIGHT: 226 LBS | RESPIRATION RATE: 18 BRPM | OXYGEN SATURATION: 97 % | SYSTOLIC BLOOD PRESSURE: 136 MMHG | TEMPERATURE: 100.1 F | HEART RATE: 92 BPM | DIASTOLIC BLOOD PRESSURE: 88 MMHG | BODY MASS INDEX: 32.35 KG/M2 | HEIGHT: 70 IN

## 2017-07-14 DIAGNOSIS — J06.9 UPPER RESPIRATORY TRACT INFECTION, UNSPECIFIED TYPE: Primary | ICD-10-CM

## 2017-07-14 RX ORDER — CODEINE PHOSPHATE AND GUAIFENESIN 10; 100 MG/5ML; MG/5ML
5 SOLUTION ORAL
Qty: 118 ML | Refills: 0 | Status: SHIPPED | OUTPATIENT
Start: 2017-07-14 | End: 2017-10-20

## 2017-07-14 RX ORDER — BENZONATATE 100 MG/1
100 CAPSULE ORAL
Qty: 21 CAP | Refills: 0 | Status: SHIPPED | OUTPATIENT
Start: 2017-07-14 | End: 2017-07-21

## 2017-07-14 RX ORDER — AZITHROMYCIN 250 MG/1
TABLET, FILM COATED ORAL
Qty: 6 TAB | Refills: 0 | Status: SHIPPED | OUTPATIENT
Start: 2017-07-14 | End: 2017-08-17 | Stop reason: ALTCHOICE

## 2017-07-14 NOTE — PROGRESS NOTES
Della Cash is a 57 y.o. male presenting today for Nasal Congestion (x6 days) and Sneezing  . HPI:  Della Cash presents to the office today for productive cough, sneezing, facial pressure and nasal congestion x 1 week. Patient noted his symptoms have progressively worsening. Review of Systems   Constitutional: Positive for fever. HENT: Positive for congestion and sore throat. Respiratory: Positive for cough and sputum production. Cardiovascular: Negative for chest pain and palpitations. Allergies   Allergen Reactions    Augmentin [Amoxicillin-Pot Clavulanate] Itching    Penicillins Rash       Current Outpatient Prescriptions   Medication Sig Dispense Refill    azithromycin (ZITHROMAX) 250 mg tablet Take 2 tablets today, then take 1 tablet daily 6 Tab 0    guaiFENesin-codeine (ROBITUSSIN AC) 100-10 mg/5 mL solution Take 5 mL by mouth three (3) times daily as needed for Cough. Max Daily Amount: 15 mL. Do not drive if taking this medication 118 mL 0    benzonatate (TESSALON) 100 mg capsule Take 1 Cap by mouth three (3) times daily as needed for Cough for up to 7 days. 21 Cap 0    fluticasone (FLONASE) 50 mcg/actuation nasal spray 2 Sprays by Both Nostrils route daily.  montelukast (SINGULAIR) 10 mg tablet TAKE 1 TABLET BY MOUTH EVERY DAY 90 Tab 0    butalbital-acetaminophen-caff (FIORICET) -40 mg per capsule Take 2 capsules every 8 hours as needed for headache 540 Cap 3    raNITIdine (ZANTAC) 150 mg tablet TK 1 T PO HS  5    warfarin (COUMADIN) 5 mg tablet TAKE 2 AND 1/2 TABLETS BY MOUTH DAILY OR AS DIRECTED 75 Tab 5    acetaminophen (TYLENOL) 500 mg tablet Take  by mouth every six (6) hours as needed for Pain.  lansoprazole (PREVACID) 30 mg capsule Take 1 Cap by mouth Daily (before breakfast). 90 Cap 3    labetalol (NORMODYNE) 100 mg tablet TAKE 1 TABLET BY MOUTH TWICE DAILY.  180 Tab 4    lisinopril-hydroCHLOROthiazide (PRINZIDE, ZESTORETIC) 20-12.5 mg per tablet TAKE 1 TABLET BY MOUTH DAILY 30 Tab 0       Past Medical History:   Diagnosis Date    Arthritis     Bleeding     Blood clot in the legs     Esophageal reflux     Headache     migraine    High cholesterol     Hypertension     Lower back pain 11/6/2010    Other chest pain     Personal history of venous thrombosis and embolism     Pure hypercholesterolemia     Right buttock pain 11/6/2010    Right foot pain     Spinal stenosis     Tendonitis, tibialis     anterior    Thromboembolus (HCC)        Past Surgical History:   Procedure Laterality Date    HX ORTHOPAEDIC  06-25-12    Right foot with excision of bursa and adipose tissue from fifth metatarsal base by Dr. Kait Malave      lower back (1992 and 2000)    HX OTHER SURGICAL      left foot (2008)    LAMINOTOMY      NERVE BLOCK         Social History     Social History    Marital status:      Spouse name: N/A    Number of children: N/A    Years of education: N/A     Occupational History    Not on file. Social History Main Topics    Smoking status: Never Smoker    Smokeless tobacco: Never Used    Alcohol use No    Drug use: No    Sexual activity: Not Currently     Other Topics Concern    Not on file     Social History Narrative       Patient does not have an advanced directive on file    Vitals:    07/14/17 1536   BP: 136/88   Pulse: 92   Resp: 18   Temp: 100.1 °F (37.8 °C)   TempSrc: Tympanic   SpO2: 97%   Weight: 226 lb (102.5 kg)   Height: 5' 10\" (1.778 m)   PainSc:   5   PainLoc: Rib Cage       Physical Exam   Constitutional: No distress. HENT:   Mouth/Throat: No oropharyngeal exudate. Cardiovascular: Normal rate, regular rhythm and normal heart sounds. Pulmonary/Chest: Effort normal and breath sounds normal.   Lymphadenopathy:     He has no cervical adenopathy. Nursing note and vitals reviewed.       Hospital Outpatient Visit on 05/24/2017   Component Date Value Ref Range Status    WBC 05/24/2017 6.2  4.6 - 13.2 K/uL Final    RBC 05/24/2017 4.37* 4.70 - 5.50 M/uL Final    HGB 05/24/2017 14.1  13.0 - 16.0 g/dL Final    HCT 05/24/2017 41.6  36.0 - 48.0 % Final    MCV 05/24/2017 95.2  74.0 - 97.0 FL Final    MCH 05/24/2017 32.3  24.0 - 34.0 PG Final    MCHC 05/24/2017 33.9  31.0 - 37.0 g/dL Final    RDW 05/24/2017 13.0  11.6 - 14.5 % Final    PLATELET 71/46/8318 581  135 - 420 K/uL Final    MPV 05/24/2017 11.3  9.2 - 11.8 FL Final    NEUTROPHILS 05/24/2017 66  40 - 73 % Final    LYMPHOCYTES 05/24/2017 22  21 - 52 % Final    MONOCYTES 05/24/2017 10  3 - 10 % Final    EOSINOPHILS 05/24/2017 2  0 - 5 % Final    BASOPHILS 05/24/2017 0  0 - 2 % Final    ABS. NEUTROPHILS 05/24/2017 4.1  1.8 - 8.0 K/UL Final    ABS. LYMPHOCYTES 05/24/2017 1.4  0.9 - 3.6 K/UL Final    ABS. MONOCYTES 05/24/2017 0.6  0.05 - 1.2 K/UL Final    ABS. EOSINOPHILS 05/24/2017 0.1  0.0 - 0.4 K/UL Final    ABS. BASOPHILS 05/24/2017 0.0  0.0 - 0.06 K/UL Final    DF 05/24/2017 AUTOMATED    Final    Sed rate, automated 05/24/2017 7  0 - 20 mm/hr Final    C-Reactive protein 05/24/2017 0.4* 0 - 0.3 mg/dL Final    Antinuclear Abs, IFA 05/24/2017 NEGATIVE     Final    Comment: (NOTE)                                      Negative   <1:80                                      Borderline  1:80                                      Positive   >1:80  Performed At: [address] [id number]  James Jimenez MD VO:[id number]      RA Latex, Ql. 05/24/2017 NEGATIVE   NEG   Final   Anti-Coag visit on 05/12/2017   Component Date Value Ref Range Status    VALID INTERNAL CONTROL POC 05/12/2017 Yes   Final    INR POC 05/12/2017 2.2   Final       .No results found for any visits on 07/14/17. Assessment / Plan:      ICD-10-CM ICD-9-CM    1.  Upper respiratory tract infection, unspecified type J06.9 465.9 azithromycin (ZITHROMAX) 250 mg tablet      guaiFENesin-codeine (ROBITUSSIN AC) 100-10 mg/5 mL solution      benzonatate (TESSALON) 100 mg capsule     Zpak rx  Cheratussin rx  Tessalon Perles rx  Continue Claritin  Continue Flonase  Tylenol for fever  F/u prn      Follow-up Disposition: Not on File    I asked the patient if he  had any questions and answered his  questions.   The patient stated that he understands the treatment plan and agrees with the treatment plan

## 2017-07-14 NOTE — PROGRESS NOTES
Patient presents for   Chief Complaint   Patient presents with    Nasal Congestion     x6 days    Sneezing     Fall risk assessment was not indicated. Depression screening was not indicated Follow up questions were not indicated. 1. Have you been to the ER, urgent care clinic since your last visit? Hospitalized since your last visit? No    2. Have you seen or consulted any other health care providers outside of the [facility] since your last visit? Include any pap smears or colon screening.  No

## 2017-07-28 ENCOUNTER — ANTI-COAG VISIT (OUTPATIENT)
Dept: INTERNAL MEDICINE CLINIC | Age: 62
End: 2017-07-28

## 2017-07-28 DIAGNOSIS — Z79.01 WARFARIN ANTICOAGULATION: ICD-10-CM

## 2017-07-28 DIAGNOSIS — Z86.718 PERSONAL HISTORY OF VENOUS THROMBOSIS AND EMBOLISM: ICD-10-CM

## 2017-07-28 LAB
INR BLD: 2.1
PT POC: NORMAL SECONDS
VALID INTERNAL CONTROL?: YES

## 2017-07-31 DIAGNOSIS — K21.9 GASTROESOPHAGEAL REFLUX DISEASE WITHOUT ESOPHAGITIS: ICD-10-CM

## 2017-08-03 RX ORDER — LANSOPRAZOLE 30 MG/1
CAPSULE, DELAYED RELEASE ORAL
Qty: 90 CAP | Refills: 3 | Status: SHIPPED | OUTPATIENT
Start: 2017-08-03 | End: 2018-08-06 | Stop reason: SDUPTHER

## 2017-08-03 NOTE — TELEPHONE ENCOUNTER
Requested Prescriptions     Pending Prescriptions Disp Refills    lansoprazole (PREVACID) 30 mg capsule [Pharmacy Med Name: Rory Darden DR CAP 30MG RX] 90 Cap 3     Sig: TAKE 1 CAPSULE DAILY BEFOREBREAKFAST

## 2017-08-06 RX ORDER — LABETALOL 100 MG/1
TABLET, FILM COATED ORAL
Qty: 180 TAB | Refills: 0 | Status: SHIPPED | OUTPATIENT
Start: 2017-08-06 | End: 2018-03-26 | Stop reason: SDUPTHER

## 2017-08-06 RX ORDER — MONTELUKAST SODIUM 10 MG/1
TABLET ORAL
Qty: 90 TAB | Refills: 0 | Status: SHIPPED | OUTPATIENT
Start: 2017-08-06 | End: 2017-11-07 | Stop reason: SDUPTHER

## 2017-08-08 ENCOUNTER — TELEPHONE (OUTPATIENT)
Dept: INTERNAL MEDICINE CLINIC | Age: 62
End: 2017-08-08

## 2017-08-08 NOTE — TELEPHONE ENCOUNTER
Ms Kelsey Dawson contacted office and received information regarding prior auth and expressed understanding

## 2017-08-08 NOTE — TELEPHONE ENCOUNTER
Prior Auth request received from Pharmacy for previcid. Paperwork completed and faxed to insurance. Awaiting response.

## 2017-08-08 NOTE — TELEPHONE ENCOUNTER
Approval received from insurance for previcid. Pharmacy faxed insurance decision. I have attempted to contact this patient by phone with the following results: left message to return my call on answering machine.

## 2017-08-15 NOTE — PROGRESS NOTES
Mr. Paulino Mistry is here today for anticoagulation monitoring for the diagnosis of DVT. His INR goal is 2.0-3.0 and his current Coumadin dose is 10 mg Mon and Thurs then 12.5 mg all other days. Today's findings include an INR of 2.1 (normal INR range 0.8-1.2). Considering Mr. Winters's past history, todays findings, and per the coumadin policy/protocol, Mr. Chalo Wheatley was instructed to take Coumadin as follows, 10 mg Mon and Thurs then 12.5 mg all other days. He was also instructed to schedule an appointment in 3 weeks prior to leaving for an INR check. A full discussion of the nature of anticoagulants has been carried out. A full discussion of the need for frequent and regular monitoring, precise dosage adjustment and compliance was stressed. Side effects of potential bleeding were discussed and Mr. Chalo Wheatley was instructed to call [phone] if there are any signs of abnormal bleeding. Mr. Chalo Wheatley was instructed to avoid any OTC items containing aspirin or ibuprofen and prior to starting any new OTC products to consult with his physician or pharmacist to ensure no drug interactions are present. Mr. Chalo Wheatley was instructed to avoid any major changes in his general diet and to avoid alcohol consumption. .        Mr. Chalo Wheatley verbalized his understanding of all instructions and will call the office with any questions, concerns, or signs of abnormal bleeding or blood clot.

## 2017-08-15 NOTE — PATIENT INSTRUCTIONS
Taking Warfarin Safely: Care Instructions  Your Care Instructions  Warfarin is a medicine that you take to prevent blood clots. It is often called a blood thinner. Doctors give warfarin (such as Coumadin) to reduce the risk of blood clots. You may be at risk for blood clots if you have atrial fibrillation or deep vein thrombosis. Some other health problems may also put you at risk. Warfarin slows the amount of time it takes for your blood to clot. It can cause bleeding problems. Even if you've been taking warfarin for a while, it's important to know how to take it safely. Foods and other medicines can affect the way warfarin works. Some can make warfarin work too well. This can cause bleeding problems. And some can make it work poorly, so that it does not prevent blood clots very well. You will need regular blood tests to check how long it takes for your blood to form a clot. This test is called a PT or prothrombin time test. The result of the test is called an INR level. Depending on the test results, your doctor or anticoagulation clinic may adjust your dose of warfarin. Follow-up care is a key part of your treatment and safety. Be sure to make and go to all appointments, and call your doctor if you are having problems. It's also a good idea to know your test results and keep a list of the medicines you take. How can you care for yourself at home? Take warfarin safely  · Take your warfarin at the same time each day. · If you miss a dose of warfarin, don't take an extra dose to make up for it. Your doctor can tell you exactly what to do so you don't take too much or too little. · Wear medical alert jewelry that lets others know that you take warfarin. You can buy this at most drugstores. · Don't take warfarin if you are pregnant or planning to get pregnant. Talk to your doctor about how you can prevent getting pregnant while you are taking it.   · Don't change your dose or stop taking warfarin unless your doctor tells you to. Effects of medicines and food on warfarin  · Don't start or stop taking any medicines, vitamins, or natural remedies unless you first talk to your doctor. Many medicines can affect how warfarin works. These include aspirin and other pain relievers, over-the-counter medicines, multivitamins, dietary supplements, and herbal products. · Tell all of your doctors and pharmacists that you take warfarin. Some prescription medicines can affect how warfarin works. · Keep the amount of vitamin K in your diet about the same from day to day. Do not suddenly eat a lot more or a lot less food that is rich in vitamin K than you usually do. Vitamin K affects how warfarin works and how your blood clots. Talk with your doctor before making big changes in your diet. Foods that have a lot of vitamin K include cooked green vegetables, such as:  ¨ Kale, spinach, turnip greens, lanette greens, Swiss chard, and mustard greens. ¨ Two Dot sprouts, broccoli, and asparagus. · Limit your use of alcohol. Avoid bleeding by preventing falls and injuries  · Wear slippers or shoes with nonskid soles. · Remove throw rugs and clutter. · Rearrange furniture and electrical cords to keep them out of walking paths. · Keep stairways, porches, and outside walkways well lit. Use night-lights in hallways and bathrooms. · Be extra careful when you work with sharp tools or knives. When should you call for help? Call 911 anytime you think you may need emergency care. For example, call if:  · You have a sudden, severe headache that is different from past headaches. Call your doctor now or seek immediate medical care if:  · You have any abnormal bleeding, such as:  ¨ Nosebleeds. ¨ Vaginal bleeding that is different (heavier, more frequent, at a different time of the month) than what you are used to. ¨ Bloody or black stools, or rectal bleeding. ¨ Bloody or pink urine.   Watch closely for changes in your health, and be sure to contact your doctor if you have any problems. Where can you learn more? Go to http://rivka-jarrell.info/. Enter M096 in the search box to learn more about \"Taking Warfarin Safely: Care Instructions. \"  Current as of: November 15, 2016  Content Version: 11.3  © 4483-2038 [employer]. Care instructions adapted under license by [employer] (which disclaims liability or warranty for this information). If you have questions about a medical condition or this instruction, always ask your healthcare professional. [address] any warranty or liability for your use of this information.

## 2017-08-17 ENCOUNTER — OFFICE VISIT (OUTPATIENT)
Dept: INTERNAL MEDICINE CLINIC | Age: 62
End: 2017-08-17

## 2017-08-17 VITALS
HEIGHT: 70 IN | TEMPERATURE: 98.9 F | DIASTOLIC BLOOD PRESSURE: 80 MMHG | BODY MASS INDEX: 31.92 KG/M2 | RESPIRATION RATE: 16 BRPM | WEIGHT: 223 LBS | HEART RATE: 76 BPM | SYSTOLIC BLOOD PRESSURE: 132 MMHG | OXYGEN SATURATION: 96 %

## 2017-08-17 DIAGNOSIS — Z86.718 PERSONAL HISTORY OF VENOUS THROMBOSIS AND EMBOLISM: ICD-10-CM

## 2017-08-17 DIAGNOSIS — M25.50 ARTHRALGIA, UNSPECIFIED JOINT: Primary | ICD-10-CM

## 2017-08-17 NOTE — PATIENT INSTRUCTIONS
Health Maintenance Due   Topic    Hepatitis C Screening     ZOSTER VACCINE AGE 60>     INFLUENZA AGE 9 TO ADULT

## 2017-08-17 NOTE — PROGRESS NOTES
1. Have you been to the ER, urgent care clinic since your last visit? Hospitalized since your last visit? No    2. Have you seen or consulted any other health care providers outside of the [address] [place] [address] since your last visit? Include any pap smears or colon screening.  No

## 2017-08-20 NOTE — PROGRESS NOTES
The patient presents to the office today with the chief complaint of joint pain    HPI    The patient complains of pain and stiffness in multiple joints - most marked in his hands. 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 The patient remains on Coumadin due to chronic DVT. Review of Systems   Respiratory: Negative for shortness of breath. Cardiovascular: Negative for chest pain and leg swelling. Musculoskeletal: Positive for joint pain. Allergies   Allergen Reactions    Augmentin [Amoxicillin-Pot Clavulanate] Itching    Penicillins Rash       Current Outpatient Prescriptions   Medication Sig Dispense Refill    montelukast (SINGULAIR) 10 mg tablet TAKE 1 TABLET BY MOUTH EVERY DAY 90 Tab 0    labetalol (NORMODYNE) 100 mg tablet TAKE ONE TABLET BY MOUTH TWICE DAILY 180 Tab 0    lansoprazole (PREVACID) 30 mg capsule TAKE 1 CAPSULE DAILY BEFOREBREAKFAST 90 Cap 3    guaiFENesin-codeine (ROBITUSSIN AC) 100-10 mg/5 mL solution Take 5 mL by mouth three (3) times daily as needed for Cough. Max Daily Amount: 15 mL. Do not drive if taking this medication 118 mL 0    fluticasone (FLONASE) 50 mcg/actuation nasal spray 2 Sprays by Both Nostrils route daily.  lisinopril-hydroCHLOROthiazide (PRINZIDE, ZESTORETIC) 20-12.5 mg per tablet TAKE 1 TABLET BY MOUTH DAILY 30 Tab 0    butalbital-acetaminophen-caff (FIORICET) -40 mg per capsule Take 2 capsules every 8 hours as needed for headache 540 Cap 3    raNITIdine (ZANTAC) 150 mg tablet TK 1 T PO HS  5    warfarin (COUMADIN) 5 mg tablet TAKE 2 AND 1/2 TABLETS BY MOUTH DAILY OR AS DIRECTED 75 Tab 5    acetaminophen (TYLENOL) 500 mg tablet Take  by mouth every six (6) hours as needed for Pain.          Past Medical History:   Diagnosis Date    Arthritis     Bleeding     Blood clot in the legs     Esophageal reflux     Headache     migraine    High cholesterol     Hypertension     Lower back pain 11/6/2010    Other chest pain     Personal history of venous thrombosis and embolism     Pure hypercholesterolemia     Right buttock pain 11/6/2010    Right foot pain     Spinal stenosis     Tendonitis, tibialis     anterior    Thromboembolus (HCC)        Past Surgical History:   Procedure Laterality Date   Chika Clemons ORTHOPAEDIC  06-25-12    Right foot with excision of bursa and adipose tissue from fifth metatarsal base by Dr. Sanjay Shah      lower back (1992 and 2000)    HX OTHER SURGICAL      left foot (2008)    LAMINOTOMY      NERVE BLOCK         Social History     Social History    Marital status:      Spouse name: N/A    Number of children: N/A    Years of education: N/A     Occupational History    Not on file. Social History Main Topics    Smoking status: Never Smoker    Smokeless tobacco: Never Used    Alcohol use No    Drug use: No    Sexual activity: Not Currently     Other Topics Concern    Not on file     Social History Narrative       Patient does not have an advanced directive on file    Visit Vitals    /80 (BP 1 Location: Left arm, BP Patient Position: Sitting)    Pulse 76    Temp 98.9 °F (37.2 °C) (Tympanic)    Resp 16    Ht 5' 10\" (1.778 m)    Wt 223 lb (101.2 kg)    SpO2 96%    BMI 32 kg/m2       Physical Exam   No Cervical Lymphadenopathy  No Supraclavicular Lymphadenopathy  Thyroid is Normal  Lungs are clear to ausculation and percussion  Heart:  S1 S2 are normal, No gallops, No mummers  No Carotid Bruits  Abdomen:  Normal Bowel Sounds. No tenderness. No masses. No Hepatomegaly or Splenomegly  LE:  Strong Pedal Pulses. No Edema  Hands with abnormalities of osteoarthritis      I have reviewed/discussed the above normal BMI with the patient. I have recommended the following interventions: dietary management education, guidance, and counseling . Landry Walker         Anti-Coag visit on 07/28/2017   Component Date Value Ref Range Status    VALID INTERNAL CONTROL POC 07/28/2017 Yes   Final    INR POC 07/28/2017 2.1   Final   Hospital Outpatient Visit on 05/24/2017   Component Date Value Ref Range Status    WBC 05/24/2017 6.2  4.6 - 13.2 K/uL Final    RBC 05/24/2017 4.37* 4.70 - 5.50 M/uL Final    HGB 05/24/2017 14.1  13.0 - 16.0 g/dL Final    HCT 05/24/2017 41.6  36.0 - 48.0 % Final    MCV 05/24/2017 95.2  74.0 - 97.0 FL Final    MCH 05/24/2017 32.3  24.0 - 34.0 PG Final    MCHC 05/24/2017 33.9  31.0 - 37.0 g/dL Final    RDW 05/24/2017 13.0  11.6 - 14.5 % Final    PLATELET 98/50/2576 906  135 - 420 K/uL Final    MPV 05/24/2017 11.3  9.2 - 11.8 FL Final    NEUTROPHILS 05/24/2017 66  40 - 73 % Final    LYMPHOCYTES 05/24/2017 22  21 - 52 % Final    MONOCYTES 05/24/2017 10  3 - 10 % Final    EOSINOPHILS 05/24/2017 2  0 - 5 % Final    BASOPHILS 05/24/2017 0  0 - 2 % Final    ABS. NEUTROPHILS 05/24/2017 4.1  1.8 - 8.0 K/UL Final    ABS. LYMPHOCYTES 05/24/2017 1.4  0.9 - 3.6 K/UL Final    ABS. MONOCYTES 05/24/2017 0.6  0.05 - 1.2 K/UL Final    ABS. EOSINOPHILS 05/24/2017 0.1  0.0 - 0.4 K/UL Final    ABS. BASOPHILS 05/24/2017 0.0  0.0 - 0.06 K/UL Final    DF 05/24/2017 AUTOMATED    Final    Sed rate, automated 05/24/2017 7  0 - 20 mm/hr Final    C-Reactive protein 05/24/2017 0.4* 0 - 0.3 mg/dL Final    Antinuclear Abs, IFA 05/24/2017 NEGATIVE     Final    Comment: (NOTE)                                      Negative   <1:80                                      Borderline  1:80                                      Positive   >1:80  Performed At: [address] [id number]  Macario Alvarado MD KO:[id number]      RA Latex, Ql. 05/24/2017 NEGATIVE   NEG   Final       .No results found for any visits on 08/17/17. Assessment / Plan      ICD-10-CM ICD-9-CM    1. Arthralgia, unspecified joint M25.50 719.40    2.  Personal history of venous thrombosis and embolism Z86.718 V12.51      Add Osteo Biflex  he was advised to continue his maintenance medications      Follow-up Disposition:  Return in about 4 months (around 12/17/2017). I asked Augie Garrett if he has any questions and I answered the questions.   Augie Garrett states that he understands the treatment plan and agrees with the treatment plan

## 2017-08-21 RX ORDER — FLUTICASONE PROPIONATE 50 MCG
2 SPRAY, SUSPENSION (ML) NASAL DAILY
Qty: 1 BOTTLE | Refills: 5 | Status: SHIPPED | OUTPATIENT
Start: 2017-08-21 | End: 2017-09-20

## 2017-08-21 NOTE — TELEPHONE ENCOUNTER
DR. Jacqui Black was suppossed to send in Dtime when he saw him the other day, please send to [facility] on [address] today.

## 2017-09-01 ENCOUNTER — TELEPHONE (OUTPATIENT)
Dept: INTERNAL MEDICINE CLINIC | Age: 62
End: 2017-09-01

## 2017-09-01 NOTE — TELEPHONE ENCOUNTER
Patients wife called regarding the Triamterene-hydrochlorothiazide prescription that was just prescribed. He will take this one but wants to know if he should stop taking one of his other BP meds. He also needs to know how to take them all (at morning or night or what). Please call them back today at [phone].

## 2017-09-02 ENCOUNTER — HOSPITAL ENCOUNTER (OUTPATIENT)
Dept: LAB | Age: 62
Discharge: HOME | End: 2017-09-02
Payer: COMMERCIAL

## 2017-09-02 LAB
ALBUMIN SERPL-MCNC: 3.5 G/DL (ref 3.4–5)
ALBUMIN/GLOB SERPL: 1.3 {RATIO} (ref 0.8–1.7)
ALP SERPL-CCNC: 67 U/L (ref 45–117)
ALT SERPL-CCNC: 28 U/L (ref 16–61)
ANION GAP SERPL CALC-SCNC: 6 MMOL/L (ref 3–18)
AST SERPL-CCNC: 19 U/L (ref 15–37)
BASOPHILS # BLD: 0 K/UL (ref 0–0.06)
BASOPHILS NFR BLD: 1 % (ref 0–2)
BILIRUB SERPL-MCNC: 0.3 MG/DL (ref 0.2–1)
BUN SERPL-MCNC: 16 MG/DL (ref 7–18)
BUN/CREAT SERPL: 17 (ref 12–20)
CALCIUM SERPL-MCNC: 8.4 MG/DL (ref 8.5–10.1)
CHLORIDE SERPL-SCNC: 106 MMOL/L (ref 100–108)
CHOLEST SERPL-MCNC: 235 MG/DL
CO2 SERPL-SCNC: 29 MMOL/L (ref 21–32)
CREAT SERPL-MCNC: 0.92 MG/DL (ref 0.6–1.3)
DIFFERENTIAL METHOD BLD: ABNORMAL
EOSINOPHIL # BLD: 0.1 K/UL (ref 0–0.4)
EOSINOPHIL NFR BLD: 2 % (ref 0–5)
ERYTHROCYTE [DISTWIDTH] IN BLOOD BY AUTOMATED COUNT: 13 % (ref 11.6–14.5)
GLOBULIN SER CALC-MCNC: 2.7 G/DL (ref 2–4)
GLUCOSE SERPL-MCNC: 102 MG/DL (ref 74–99)
HCT VFR BLD AUTO: 41.6 % (ref 36–48)
HDLC SERPL-MCNC: 48 MG/DL (ref 40–60)
HDLC SERPL: 4.9 {RATIO} (ref 0–5)
HGB BLD-MCNC: 14.1 G/DL (ref 13–16)
INR PPP: 1.9 (ref 0.8–1.2)
LDLC SERPL CALC-MCNC: 135.6 MG/DL (ref 0–100)
LIPID PROFILE,FLP: ABNORMAL
LYMPHOCYTES # BLD: 1.2 K/UL (ref 0.9–3.6)
LYMPHOCYTES NFR BLD: 23 % (ref 21–52)
MCH RBC QN AUTO: 32.9 PG (ref 24–34)
MCHC RBC AUTO-ENTMCNC: 33.9 G/DL (ref 31–37)
MCV RBC AUTO: 97 FL (ref 74–97)
MONOCYTES # BLD: 0.7 K/UL (ref 0.05–1.2)
MONOCYTES NFR BLD: 13 % (ref 3–10)
NEUTS SEG # BLD: 3.4 K/UL (ref 1.8–8)
NEUTS SEG NFR BLD: 61 % (ref 40–73)
PLATELET # BLD AUTO: 149 K/UL (ref 135–420)
PMV BLD AUTO: 11.1 FL (ref 9.2–11.8)
POTASSIUM SERPL-SCNC: 3.9 MMOL/L (ref 3.5–5.5)
PROT SERPL-MCNC: 6.2 G/DL (ref 6.4–8.2)
PROTHROMBIN TIME: 20.9 SEC (ref 11.5–15.2)
RBC # BLD AUTO: 4.29 M/UL (ref 4.7–5.5)
SODIUM SERPL-SCNC: 141 MMOL/L (ref 136–145)
TRIGL SERPL-MCNC: 257 MG/DL (ref ?–150)
VLDLC SERPL CALC-MCNC: 51.4 MG/DL
WBC # BLD AUTO: 5.4 K/UL (ref 4.6–13.2)

## 2017-09-02 PROCEDURE — 85025 COMPLETE CBC W/AUTO DIFF WBC: CPT | Performed by: INTERNAL MEDICINE

## 2017-09-02 PROCEDURE — 85610 PROTHROMBIN TIME: CPT | Performed by: INTERNAL MEDICINE

## 2017-09-02 PROCEDURE — 36415 COLL VENOUS BLD VENIPUNCTURE: CPT | Performed by: INTERNAL MEDICINE

## 2017-09-02 PROCEDURE — 80053 COMPREHEN METABOLIC PANEL: CPT | Performed by: INTERNAL MEDICINE

## 2017-09-02 PROCEDURE — 80061 LIPID PANEL: CPT | Performed by: INTERNAL MEDICINE

## 2017-09-05 ENCOUNTER — TELEPHONE (OUTPATIENT)
Dept: ORTHOPEDIC SURGERY | Age: 62
End: 2017-09-05

## 2017-09-05 RX ORDER — METAXALONE 800 MG/1
800 TABLET ORAL
Qty: 30 TAB | Refills: 0 | Status: SHIPPED | OUTPATIENT
Start: 2017-09-05 | End: 2017-10-20 | Stop reason: SDUPTHER

## 2017-09-05 NOTE — TELEPHONE ENCOUNTER
Per doctor tristan he is to stop taking lisinopril-hct and continue triamterene-hct and labatolol and that he can take them any time during day as long as they are approximately the same time every day. Mrs Bentonjeet Rambo expressed understanding.

## 2017-09-05 NOTE — TELEPHONE ENCOUNTER
Patient's wife Rodolfo Dumont called requesting some prescriptions for the patient. She says he would like to get prescribed Prednisone 10 mg and Skelaxin 800 mg. She says for the Skelaxin he is requesting a quantity of 30-50 tablets. Please advise Susan at [phone].

## 2017-09-05 NOTE — TELEPHONE ENCOUNTER
Skelaxin refill sent to pharmacy. We do not typically give prednisone of MDP with out seeing the patient.  I see he has an apt on 9/11 with Dr. Celestino Mckeon
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
## 2017-09-06 NOTE — TELEPHONE ENCOUNTER
Spoke with wife, patient rescheduled for early appointment with Dr. Meka Pierre for tomorrow at [facility]. Directions and address provided, no further action required at this time.

## 2017-09-07 ENCOUNTER — OFFICE VISIT (OUTPATIENT)
Dept: ORTHOPEDIC SURGERY | Age: 62
End: 2017-09-07

## 2017-09-07 VITALS
WEIGHT: 227 LBS | HEIGHT: 70 IN | SYSTOLIC BLOOD PRESSURE: 140 MMHG | DIASTOLIC BLOOD PRESSURE: 86 MMHG | HEART RATE: 78 BPM | RESPIRATION RATE: 14 BRPM | BODY MASS INDEX: 32.5 KG/M2

## 2017-09-07 DIAGNOSIS — M51.36 DDD (DEGENERATIVE DISC DISEASE), LUMBAR: ICD-10-CM

## 2017-09-07 DIAGNOSIS — Z79.01 WARFARIN ANTICOAGULATION: ICD-10-CM

## 2017-09-07 DIAGNOSIS — M48.061 LUMBAR SPINAL STENOSIS: ICD-10-CM

## 2017-09-07 DIAGNOSIS — M62.838 MUSCLE SPASM: ICD-10-CM

## 2017-09-07 DIAGNOSIS — M47.816 FACET ARTHROPATHY, LUMBAR: Primary | ICD-10-CM

## 2017-09-07 RX ORDER — GLUCOSAMINE/D3/BOSWELLIA SERRA 1500MG-400
TABLET ORAL
COMMUNITY
End: 2018-02-01 | Stop reason: ALTCHOICE

## 2017-09-07 RX ORDER — METHYLPREDNISOLONE 4 MG/1
TABLET ORAL
Qty: 1 DOSE PACK | Refills: 1 | Status: SHIPPED | OUTPATIENT
Start: 2017-09-07 | End: 2017-09-07 | Stop reason: SDUPTHER

## 2017-09-07 RX ORDER — METAXALONE 800 MG/1
800 TABLET ORAL 3 TIMES DAILY
Qty: 30 TAB | Refills: 2 | Status: SHIPPED | OUTPATIENT
Start: 2017-09-07 | End: 2018-02-06 | Stop reason: SDUPTHER

## 2017-09-07 RX ORDER — METHYLPREDNISOLONE 4 MG/1
TABLET ORAL
Qty: 1 DOSE PACK | Refills: 1 | Status: SHIPPED | OUTPATIENT
Start: 2017-09-07 | End: 2017-10-20

## 2017-09-07 RX ORDER — AMPICILLIN TRIHYDRATE 250 MG
1200 CAPSULE ORAL DAILY
COMMUNITY
End: 2018-04-12 | Stop reason: ALTCHOICE

## 2017-09-07 RX ORDER — METAXALONE 800 MG/1
800 TABLET ORAL 3 TIMES DAILY
Qty: 180 TAB | Refills: 1 | Status: SHIPPED | OUTPATIENT
Start: 2017-09-07 | End: 2017-10-20 | Stop reason: SDUPTHER

## 2017-09-07 RX ORDER — LORATADINE 10 MG/1
10 TABLET ORAL
COMMUNITY
End: 2017-10-20

## 2017-09-07 NOTE — MR AVS SNAPSHOT
Visit Information Date & Time Provider Department Dept. Phone Encounter #  
 9/7/2017  7:50 AM Viktoria Bergman MD [facility] [place] [phone] Follow-up Instructions Return in about 3 months (around 12/7/2017) for Medication follow up. Upcoming Health Maintenance Date Due Hepatitis C Screening 1955 ZOSTER VACCINE AGE 60> 2/13/2013 INFLUENZA AGE 9 TO ADULT 8/1/2017 DTaP/Tdap/Td series (2 - Td) 12/6/2024 COLONOSCOPY 5/27/2026 Allergies as of 9/7/2017  Review Complete On: 9/7/2017 By: Viktoria Bergman MD  
  
 Severity Noted Reaction Type Reactions Augmentin [Amoxicillin-pot Clavulanate]    Itching Penicillins    Rash Current Immunizations  Reviewed on 5/26/2017 Name Date Tdap 12/6/2014 Not reviewed this visit You Were Diagnosed With   
  
 Codes Comments Facet arthropathy, lumbar    -  Primary ICD-10-CM: M12.88 ICD-9-CM: 721.3 Lumbar spinal stenosis     ICD-10-CM: M48.06 
ICD-9-CM: 724.02   
 DDD (degenerative disc disease), lumbar     ICD-10-CM: M51.36 
ICD-9-CM: 722.52 Muscle spasm     ICD-10-CM: X79.259 ICD-9-CM: 728.85 Warfarin anticoagulation     ICD-10-CM: Z79.01 
ICD-9-CM: V58.61 Vitals BP Pulse Resp Height(growth percentile) Weight(growth percentile) BMI  
 140/86 78 14 5' 10\" (1.778 m) 227 lb (103 kg) 32.57 kg/m2 Smoking Status Never Smoker BMI and BSA Data Body Mass Index Body Surface Area 32.57 kg/m 2 2.26 m 2 Preferred Pharmacy Pharmacy Name Phone [facility] "[address] Your Updated Medication List  
  
   
This list is accurate as of: 9/7/17  8:29 AM.  Always use your most recent med list.  
  
  
  
  
 acetaminophen 500 mg tablet Commonly known as:  TYLENOL Take  by mouth every six (6) hours as needed for Pain. butalbital-acetaminophen-caff -40 mg per capsule Commonly known as:  [employer] Take 2 capsules every 8 hours as needed for headache CLARITIN 10 mg tablet Generic drug:  loratadine Take 10 mg by mouth. fluticasone 50 mcg/actuation nasal spray Commonly known as:  Randall Wilman 2 Sprays by Both Nostrils route daily for 30 days. guaiFENesin-codeine 100-10 mg/5 mL solution Commonly known as:  ROBITUSSIN AC Take 5 mL by mouth three (3) times daily as needed for Cough. Max Daily Amount: 15 mL. Do not drive if taking this medication  
  
 labetalol 100 mg tablet Commonly known as:  NORMODYNE  
TAKE ONE TABLET BY MOUTH TWICE DAILY  
  
 lansoprazole 30 mg capsule Commonly known as:  PREVACID TAKE 1 CAPSULE DAILY BEFOREBREAKFAST * metaxalone 800 mg tablet Commonly known as:  SKELAXIN Take 1 Tab by mouth three (3) times daily as needed for Pain. * metaxalone 800 mg tablet Commonly known as:  SKELAXIN Take 1 Tab by mouth three (3) times daily. Indications: MUSCLE SPASM * metaxalone 800 mg tablet Commonly known as:  SKELAXIN Take 1 Tab by mouth three (3) times daily. Indications: MUSCLE SPASM  
  
 methylPREDNISolone 4 mg tablet Commonly known as:  Blinda Creed Per dose pack instructions  
  
 montelukast 10 mg tablet Commonly known as:  SINGULAIR  
TAKE 1 TABLET BY MOUTH EVERY DAY  
  
 OSTEO BI-FLEX (5-LOXIN) 1,500-400-100 mg-unit-mg Tab Generic drug:  glucosamine-D3-Boswellia serr Take  by mouth. PROBIOTIC 4X 10-15 mg Tbec Generic drug:  B.infantis-B.ani-B.long-B.bifi Take  by mouth. raNITIdine 150 mg tablet Commonly known as:  ZANTAC TK 1 T PO HS  
  
 red yeast rice extract 600 mg Cap Take 600 mg by mouth now.  
  
 triamterene-hydroCHLOROthiazide 37.5-25 mg per capsule Commonly known as:  DYAZIDE  
1 tablet each AM  
  
 warfarin 5 mg tablet Commonly known as:  COUMADIN  
 TAKE 2 AND 1/2 TABLETS BY MOUTH DAILY OR AS DIRECTED * Notice: This list has 3 medication(s) that are the same as other medications prescribed for you. Read the directions carefully, and ask your doctor or other care provider to review them with you. Prescriptions Printed Refills  
 methylPREDNISolone (MEDROL DOSEPACK) 4 mg tablet 1 Sig: Per dose pack instructions Class: Print  
 metaxalone (SKELAXIN) 800 mg tablet 2 Sig: Take 1 Tab by mouth three (3) times daily. Indications: MUSCLE SPASM Class: Print Route: Oral  
  
Prescriptions Sent to Pharmacy Refills  
 metaxalone (SKELAXIN) 800 mg tablet 1 Sig: Take 1 Tab by mouth three (3) times daily. Indications: MUSCLE SPASM Class: Normal  
 Pharmacy: [facility] Odilia Vann [address] #: [phone] Route: Oral  
  
Follow-up Instructions Return in about 3 months (around 12/7/2017) for Medication follow up. Patient Instructions Low Back Arthritis: Exercises Your Care Instructions Here are some examples of typical rehabilitation exercises for your condition. Start each exercise slowly. Ease off the exercise if you start to have pain. Your doctor or physical therapist will tell you when you can start these exercises and which ones will work best for you. When you are not being active, find a comfortable position for rest. Some people are comfortable on the floor or a medium-firm bed with a small pillow under their head and another under their knees. Some people prefer to lie on their side with a pillow between their knees. Don't stay in one position for too long. Take short walks (10 to 20 minutes) every 2 to 3 hours. Avoid slopes, hills, and stairs until you feel better. Walk only distances you can manage without pain, especially leg pain. How to do the exercises Pelvic tilt 1. Lie on your back with your knees bent. 2. \"Brace\" your stomachtighten your muscles by pulling in and imagining your belly button moving toward your spine. 3. Press your lower back into the floor. You should feel your hips and pelvis rock back. 4. Hold for 6 seconds while breathing smoothly. 5. Relax and allow your pelvis and hips to rock forward. 6. Repeat 8 to 12 times. Back stretches 1. Get down on your hands and knees on the floor. 2. Relax your head and allow it to droop. Round your back up toward the ceiling until you feel a nice stretch in your upper, middle, and lower back. Hold this stretch for as long as it feels comfortable, or about 15 to 30 seconds. 3. Return to the starting position with a flat back while you are on your hands and knees. 4. Let your back sway by pressing your stomach toward the floor. Lift your buttocks toward the ceiling. 5. Hold this position for 15 to 30 seconds. 6. Repeat 2 to 4 times. Follow-up care is a key part of your treatment and safety. Be sure to make and go to all appointments, and call your doctor if you are having problems. It's also a good idea to know your test results and keep a list of the medicines you take. Where can you learn more? Go to http://rivka-jarrell.info/. Enter J563 in the search box to learn more about \"Low Back Arthritis: Exercises. \" Current as of: March 21, 2017 Content Version: 11.3 © 0784-9683 [employer]. Care instructions adapted under license by [employer] (which disclaims liability or warranty for this information). If you have questions about a medical condition or this instruction, always ask your healthcare professional. [address] any warranty or liability for your use of this information. Introducing [facility]! [facility] introduces [employer] patient portal. Now you can access parts of your medical record, email your doctor's office, and request medication refills online. 1. In your internet browser, go to [email or web address] 2. Click on the First Time User? Click Here link in the Sign In box. You will see the New Member Sign Up page. 3. Enter your [employer] Access Code exactly as it appears below. You will not need to use this code after youve completed the sign-up process. If you do not sign up before the expiration date, you must request a new code. · [employer] Access Code: [email or web address] Expires: 11/13/2017 11:46 AM 
 
4. Enter the last four digits of your Social Security Number (xxxx) and Date of Birth (mm/dd/yyyy) as indicated and click Submit. You will be taken to the next sign-up page. 5. Create a [employer] ID. This will be your [employer] login ID and cannot be changed, so think of one that is secure and easy to remember. 6. Create a [employer] password. You can change your password at any time. 7. Enter your Password Reset Question and Answer. This can be used at a later time if you forget your password. 8. Enter your e-mail address. You will receive e-mail notification when new information is available in [address] 9. Click Sign Up. You can now view and download portions of your medical record. 10. Click the Download Summary menu link to download a portable copy of your medical information. If you have questions, please visit the Frequently Asked Questions section of the [employer] website. Remember, [employer] is NOT to be used for urgent needs. For medical emergencies, dial 911. Now available from your iPhone and Android! Please provide this summary of care documentation to your next provider. Your primary care clinician is listed as Kale Ni. If you have any questions after today's visit, please call [phone].

## 2017-09-07 NOTE — PROGRESS NOTES
[facility]  [address], [facility] [address]  Phone: [phone]  Fax: [phone]      ASSESSMENT   Diagnoses and all orders for this visit:    1. Facet arthropathy, lumbar  -     methylPREDNISolone (MEDROL DOSEPACK) 4 mg tablet; Per dose pack instructions    2. Lumbar spinal stenosis    3. DDD (degenerative disc disease), lumbar    4. Muscle spasm  -     metaxalone (SKELAXIN) 800 mg tablet; Take 1 Tab by mouth three (3) times daily. Indications: MUSCLE SPASM    5. Warfarin anticoagulation    Other orders  -     metaxalone (SKELAXIN) 800 mg tablet; Take 1 Tab by mouth three (3) times daily. Indications: MUSCLE SPASM         IMPRESSION AND PLAN:  Antonia Cristina is a 57 y.o. male with history of lumbar pain. He reports experiencing increased lower back pain (8-9/10) over the weekend but states that his pain has since improved. Today he experiences some swelling in the right lower back and c/o 5-6/10 dull aching lower back pain. 1) Pt was given information on lumbar arthritis exercises. 2) He received Skelaxin 800 mg 1 tab TID prn muscle spasm. 3) Pt was prescribed a Medrol Dosepak with a refill. 4) He is not a candidate for NSAID's due to chronic anticoagulation with Coumadin. 5) Mr. Jose De Anda has a reminder for a \"due or due soon\" health maintenance. I have asked that he contact his primary care provider, Blade Levy MD, for follow-up on this health maintenance. 6)  demonstrated consistency with prescribing. 7) Pt will follow-up in 3 months or sooner if needed. HISTORY OF PRESENT ILLNESS:  Antonia Cristina is a 57 y.o. male with history of lumbar pain. He reports experiencing increased lower back pain (8-9/10) over the weekend but states that his pain has since improved. Today he experiences some swelling in the right lower back and c/o 5-6/10 dull aching lower back pain.  He admits that recently he may be bending over more frequently with yard work but is unable to recall any inciting injuries. Pt reports experiencing intermittent sharp pains in the lower back starting about 2 weekends ago. He experienced severe pain in the lower back when bending forward to put on his compression stockings. This pain improved when he stood up and stretched. He denies any pain radiating down the legs or weakness at this time. Pt has alternated between ice and heat and has tried rest. Pt reports difficulty getting his Skelaxin 800 mg filled with [facility]. Pt at this time desires to proceed with medication evaluation. Pain Scale: 6/10    PCP: Julien Andre MD       Past Medical History:   Diagnosis Date    Arthritis     Bleeding     Blood clot in the legs     Esophageal reflux     Headache     migraine    High cholesterol     Hypertension     Lower back pain 11/6/2010    Other chest pain     Personal history of venous thrombosis and embolism     Pure hypercholesterolemia     Right buttock pain 11/6/2010    Right foot pain     Spinal stenosis     Tendonitis, tibialis     anterior    Thromboembolus ([facility] [address]         Social History     Social History    Marital status:      Spouse name: N/A    Number of children: N/A    Years of education: N/A     Occupational History    Not on file. Social History Main Topics    Smoking status: Never Smoker    Smokeless tobacco: Never Used    Alcohol use No    Drug use: No    Sexual activity: Not Currently     Other Topics Concern    Not on file     Social History Narrative       Current Outpatient Prescriptions   Medication Sig Dispense Refill    loratadine (CLARITIN) 10 mg tablet Take 10 mg by mouth.  B.infantis-B.ani-B.long-B.bifi (PROBIOTIC 4X) 10-15 mg TbEC Take  by mouth.  glucosamine-D3-Boswellia serr (OSTEO BI-FLEX, 5-LOXIN,) 1,500-400-100 mg-unit-mg tab Take  by mouth.  red yeast rice extract 600 mg cap Take 600 mg by mouth now.       methylPREDNISolone (MEDROL DOSEPACK) 4 mg tablet Per dose pack instructions 1 Dose Pack 1    metaxalone (SKELAXIN) 800 mg tablet Take 1 Tab by mouth three (3) times daily. Indications: MUSCLE SPASM 30 Tab 2    metaxalone (SKELAXIN) 800 mg tablet Take 1 Tab by mouth three (3) times daily. Indications: MUSCLE SPASM 180 Tab 1    metaxalone (SKELAXIN) 800 mg tablet Take 1 Tab by mouth three (3) times daily as needed for Pain. 30 Tab 0    triamterene-hydroCHLOROthiazide (DYAZIDE) 37.5-25 mg per capsule 1 tablet each AM 30 Cap 3    fluticasone (FLONASE) 50 mcg/actuation nasal spray 2 Sprays by Both Nostrils route daily for 30 days. 1 Bottle 5    montelukast (SINGULAIR) 10 mg tablet TAKE 1 TABLET BY MOUTH EVERY DAY 90 Tab 0    labetalol (NORMODYNE) 100 mg tablet TAKE ONE TABLET BY MOUTH TWICE DAILY 180 Tab 0    lansoprazole (PREVACID) 30 mg capsule TAKE 1 CAPSULE DAILY BEFOREBREAKFAST 90 Cap 3    guaiFENesin-codeine (ROBITUSSIN AC) 100-10 mg/5 mL solution Take 5 mL by mouth three (3) times daily as needed for Cough. Max Daily Amount: 15 mL. Do not drive if taking this medication 118 mL 0    butalbital-acetaminophen-caff (FIORICET) -40 mg per capsule Take 2 capsules every 8 hours as needed for headache 540 Cap 3    raNITIdine (ZANTAC) 150 mg tablet TK 1 T PO HS  5    warfarin (COUMADIN) 5 mg tablet TAKE 2 AND 1/2 TABLETS BY MOUTH DAILY OR AS DIRECTED 75 Tab 5    acetaminophen (TYLENOL) 500 mg tablet Take  by mouth every six (6) hours as needed for Pain. Allergies   Allergen Reactions    Augmentin [Amoxicillin-Pot Clavulanate] Itching    Penicillins Rash         REVIEW OF SYSTEMS    Constitutional: Negative for fever, chills, or weight change. Respiratory: Negative for cough or shortness of breath. Cardiovascular: Negative for chest pain or palpitations. Gastrointestinal: Negative for acid reflux, change in bowel habits, or constipation. Genitourinary: Negative for dysuria and flank pain.    Musculoskeletal: Positive for lumbar pain. Skin: Negative for rash. Neurological: Negative for headaches, dizziness, or numbness. Endo/Heme/Allergies: Negative for increased bruising. Psychiatric/Behavioral: Negative for difficulty with sleep. PHYSICAL EXAMINATION  Visit Vitals    /86    Pulse 78    Resp 14    Ht 5' 10\" (1.778 m)    Wt 227 lb (103 kg)    BMI 32.57 kg/m2       Constitutional: Awake, alert, and in no acute distress  Neurological: 1+ symmetrical DTRs in the upper extremities. 1+ symmetrical DTRs in the lower extremities. Sensation to light touch is intact. Negative Eliecer's sign bilaterally. Skin: warm, dry, and intact. Musculoskeletal: Tenderness to palpation in the lower lumbar region. Moderate pain with extension, lateral bending, and axial loading. No different with forward flexion. No pain with internal or external rotation of his hips. Negative straight leg raise bilaterally. Biceps  Triceps Deltoids Wrist Ext Wrist Flex Hand Intrin   Right +4/5 +4/5 +4/5 +4/5 +4/5 +4/5   Left +4/5 +4/5 +4/5 +4/5 +4/5 +4/5      Hip Flex  Quads Hamstrings Ankle DF EHL Ankle PF   Right +4/5 +4/5 +4/5 +4/5 +4/5 +4/5   Left +4/5 +4/5 +4/5 +4/5 +4/5 +4/5     IMAGING:    Cervical Spine MRI from 12/19/2016 was personally reviewed with the Pt and demonstrated:  Results from Hospital Encounter on 12/19/16   MRI CERV SPINE WO CONT     Narrative MRI of cervical spine without contrast    HISTORY: Chronic progressive neck pain with headaches. COMPARISON: No prior MRI. Cervical spine radiographs from 12/1/2016. TECHNIQUE: T1 weighted, T2 FSE, FSE inversion recovery sagittal images are  supplemented by T2 weighted and GRE/medic axial images. FINDINGS: Normal alignment and vertebral body heights. Normal marrow signal  except for mild endplate degenerative change. Cervical cord is normal in signal  and caliber.  Visualized posterior fossa contents look normal. Paraspinal soft  tissues look normal.    C2-3: No significant degenerative disc disease or spinal stenosis. C3-4: Small nonfocal disc protrusion which contacts the ventral cord and causes  borderline spinal stenosis. No foraminal stenosis. C4-5: No significant degenerative disc disease. Mildly hypertrophic facets. No  spinal stenosis. C5-6: Disc is narrowed with diffusely bulging disc and osteophyte complex. Facets are mildly hypertrophic. Mild spinal canal and moderate left foraminal  stenoses. C6-7: Disc is narrowed with diffusely bulging disc and osteophyte complex. Facets are mildly hypertrophic. Mild spinal canal and moderate bilateral  foraminal stenoses. C7-T1: No significant degenerative disc disease or spinal stenosis.               Impression Impression:    Disc osteophyte complexes causing mild spinal canal stenoses at C5-6 and C6-7. Moderate left foraminal stenosis at C5-6 and moderate bilateral foraminal  stenoses at C6-7.              LumbarSpine MRI from 1/23/2013 was personally reviewed with the Pt and demonstrated:  Final result (Exam End: 1/23/2013  6:57 PM) Open      Study Result   MRI lumbar spine with and without contrast      Comparison: August 1, 2011      Clinical information: History of prior back surgery, now with right-sided pain.      Procedure:      MRI of the lumbar spine was performed prior to and following the uneventful  administration of 20 cc of gadolinium intravenously. Sequences included:  Sagittal T1, sagittal T1 postcontrast, sagittal inversion recovery, sagittal  T2, axial T1, axial T1 postcontrast, and axial T2 with fat saturation.      Findings:      Vertebral body heights are maintained. There are mild degenerative endplate  changes at Y6-D4. No evidence for fracture. Alignment is anatomic, without  listhesis.       There is mild disc narrowing at L4-5 and moderate disc narrowing at L5-S1 with  desiccation.      The conus terminates at the L1 level.      Susceptibility artifact present in the soft tissues of the lower back  consistent with prior surgical intervention.      Correlation of axial and sagittal images reveals the following:      At L1-L2: No significant disc pathology or proliferative changes. No central  canal or foraminal stenosis.     At L2-L3: No significant disc pathology or proliferative changes. No central  canal or foraminal stenosis.     At L3-L4: Mild bulging of the posterolateral disc corners. Mild facet  arthropathy and ligamentous buckling. The canal is patent. Mild right greater  than left foraminal narrowing. Minimal progression.      At L4-L5: Prior left hemilaminectomy. Mild circumferential disc bulge,  eccentric along the posterior right disc margin. Mild facet arthropathy. Moderate right ligamentous hypertrophy. There is mild narrowing of the lateral  recesses. Mild central canal narrowing. Moderate right and mild left foraminal  narrowing. Findings look overall unchanged.      At L5-S1: Prior right hemilaminectomies. Mild posterior disc bulge/osteophyte  complex. Moderate facet arthropathy. Canal is patent. Moderate bilateral  foraminal narrowing. Findings look stable.      Visualized portions of the sacroiliac joints are unremarkable. Incidentally  imaged retroperitoneal structures are unremarkable as well.          IMPRESSION:  Postsurgical change at L4-5 and L5-S1. Central canal narrowing at L4-5 and  foraminal stenosis at L4-5/L5-S1 appear stable when compared to prior study.      Minimal progression of mild degenerative changes at L3-4. Written by Keven Gillespie, as dictated by Evangelina Woods MD.  I, Dr. Evangelina Woods confirm that all documentation is accurate.

## 2017-09-07 NOTE — PATIENT INSTRUCTIONS

## 2017-09-12 ENCOUNTER — TELEPHONE (OUTPATIENT)
Dept: INTERNAL MEDICINE CLINIC | Age: 62
End: 2017-09-12

## 2017-09-12 RX ORDER — WARFARIN SODIUM 5 MG/1
TABLET ORAL
Qty: 75 TAB | Refills: 0 | Status: SHIPPED | OUTPATIENT
Start: 2017-09-12 | End: 2017-10-18 | Stop reason: SDUPTHER

## 2017-09-12 NOTE — TELEPHONE ENCOUNTER
Called and informed spouse of lab results. Per Dr Emilia Delgado patient can continue to remain on the same dose of coumadin for now but also needs to watch his diet as cholesterol is still high. Understanding was voiced.

## 2017-09-21 ENCOUNTER — OFFICE VISIT (OUTPATIENT)
Dept: ORTHOPEDIC SURGERY | Age: 62
End: 2017-09-21

## 2017-09-21 VITALS — BODY MASS INDEX: 32.07 KG/M2 | HEIGHT: 70 IN | RESPIRATION RATE: 16 BRPM | WEIGHT: 224 LBS | OXYGEN SATURATION: 99 %

## 2017-09-21 DIAGNOSIS — M17.12 ARTHRITIS OF KNEE, LEFT: Primary | ICD-10-CM

## 2017-09-21 RX ORDER — TRIAMCINOLONE ACETONIDE 40 MG/ML
40 INJECTION, SUSPENSION INTRA-ARTICULAR; INTRAMUSCULAR ONCE
Qty: 1 ML | Refills: 0
Start: 2017-09-21 | End: 2017-09-21

## 2017-09-21 NOTE — MR AVS SNAPSHOT
Visit Information Date & Time Provider Department Dept. Phone Encounter #  
 9/21/2017  9:15 AM Jojo Barker, [facility] [id number] Your Appointments 12/4/2017  3:15 PM  
Follow Up with Henok Zepeda MD  
[address] [facility], [address] and [facility]) Appt Note: 3 mo follow up for lower back W/C  
 [address] [facility] [address] [phone]  
  
   
 [address] [facility] [address] Upcoming Health Maintenance Date Due Hepatitis C Screening 1955 ZOSTER VACCINE AGE 60> 2/13/2013 INFLUENZA AGE 9 TO ADULT 8/1/2017 DTaP/Tdap/Td series (2 - Td) 12/6/2024 COLONOSCOPY 5/27/2026 Allergies as of 9/21/2017  Review Complete On: 9/21/2017 By: Jojo Barker PA-C Severity Noted Reaction Type Reactions Augmentin [Amoxicillin-pot Clavulanate]    Itching Penicillins    Rash Current Immunizations  Reviewed on 5/26/2017 Name Date Tdap 12/6/2014 Not reviewed this visit You Were Diagnosed With   
  
 Codes Comments Arthritis of knee, left    -  Primary ICD-10-CM: M17.12 
ICD-9-CM: 716.96 Vitals Resp Height(growth percentile) Weight(growth percentile) SpO2 BMI Smoking Status 16 5' 10\" (1.778 m) 224 lb (101.6 kg) 99% 32.14 kg/m2 Never Smoker BMI and BSA Data Body Mass Index Body Surface Area  
 32.14 kg/m 2 2.24 m 2 Preferred Pharmacy Pharmacy Name Phone [facility] [address] [facility] [phone] Your Updated Medication List  
  
   
This list is accurate as of: 9/21/17 10:13 AM.  Always use your most recent med list.  
  
  
  
  
 acetaminophen 500 mg tablet Commonly known as:  TYLENOL Take  by mouth every six (6) hours as needed for Pain. butalbital-acetaminophen-caff -40 mg per capsule Commonly known as:  [employer] Take 2 capsules every 8 hours as needed for headache CLARITIN 10 mg tablet Generic drug:  loratadine Take 10 mg by mouth.  
  
 guaiFENesin-codeine 100-10 mg/5 mL solution Commonly known as:  ROBITUSSIN AC Take 5 mL by mouth three (3) times daily as needed for Cough. Max Daily Amount: 15 mL. Do not drive if taking this medication  
  
 labetalol 100 mg tablet Commonly known as:  NORMODYNE  
TAKE ONE TABLET BY MOUTH TWICE DAILY  
  
 lansoprazole 30 mg capsule Commonly known as:  PREVACID TAKE 1 CAPSULE DAILY BEFOREBREAKFAST * metaxalone 800 mg tablet Commonly known as:  SKELAXIN Take 1 Tab by mouth three (3) times daily as needed for Pain. * metaxalone 800 mg tablet Commonly known as:  SKELAXIN Take 1 Tab by mouth three (3) times daily. Indications: MUSCLE SPASM * metaxalone 800 mg tablet Commonly known as:  SKELAXIN Take 1 Tab by mouth three (3) times daily. Indications: MUSCLE SPASM  
  
 methylPREDNISolone 4 mg tablet Commonly known as:  Geraline Laurence Per dose pack instructions  
  
 montelukast 10 mg tablet Commonly known as:  SINGULAIR  
TAKE 1 TABLET BY MOUTH EVERY DAY  
  
 OSTEO BI-FLEX (5-LOXIN) 1,500-400-100 mg-unit-mg Tab Generic drug:  glucosamine-D3-Boswellia serr Take  by mouth. PROBIOTIC 4X 10-15 mg Tbec Generic drug:  B.infantis-B.ani-B.long-B.bifi Take  by mouth. raNITIdine 150 mg tablet Commonly known as:  ZANTAC TK 1 T PO HS  
  
 red yeast rice extract 600 mg Cap Take 600 mg by mouth now.  
  
 triamterene-hydroCHLOROthiazide 37.5-25 mg per capsule Commonly known as:  DYAZIDE  
1 tablet each AM  
  
 warfarin 5 mg tablet Commonly known as:  COUMADIN  
TAKE 2 AND 1/2 TABLETS BY MOUTH DAILY OR AS DIRECTED * Notice:   This list has 3 medication(s) that are the same as other medications prescribed for you. Read the directions carefully, and ask your doctor or other care provider to review them with you. Introducing [facility]! Cassie Dia introduces [employer] patient portal. Now you can access parts of your medical record, email your doctor's office, and request medication refills online. 1. In your internet browser, go to [email or web address] 2. Click on the First Time User? Click Here link in the Sign In box. You will see the New Member Sign Up page. 3. Enter your [employer] Access Code exactly as it appears below. You will not need to use this code after youve completed the sign-up process. If you do not sign up before the expiration date, you must request a new code. · [employer] Access Code: [email or web address] Expires: 11/13/2017 11:46 AM 
 
4. Enter the last four digits of your Social Security Number (xxxx) and Date of Birth (mm/dd/yyyy) as indicated and click Submit. You will be taken to the next sign-up page. 5. Create a [employer] ID. This will be your [employer] login ID and cannot be changed, so think of one that is secure and easy to remember. 6. Create a [employer] password. You can change your password at any time. 7. Enter your Password Reset Question and Answer. This can be used at a later time if you forget your password. 8. Enter your e-mail address. You will receive e-mail notification when new information is available in [address]. 9. Click Sign Up. You can now view and download portions of your medical record. 10. Click the Download Summary menu link to download a portable copy of your medical information. If you have questions, please visit the Frequently Asked Questions section of the [employer] website. Remember, [employer] is NOT to be used for urgent needs. For medical emergencies, dial 911. Now available from your iPhone and Android! Please provide this summary of care documentation to your next provider. Your primary care clinician is listed as Enoch Whitney. If you have any questions after today's visit, please call [phone].

## 2017-09-21 NOTE — PROGRESS NOTES
HISTORY OF PRESENT ILLNESS:  Jaime Mahmood presents to the office complaining of a one-month history of worsening left knee pain. He was seen a week ago under the care of Dr. José Miguel Crooks for the same complaint. Unfortunately, he called our office two weeks ago and was told that the first available appointment was in October of this year. His pain was so bad that he needed to see someone as quickly as possible, and therefore, set up an appointment with Dr. José Miguel Crooks. Dr. José Miguel Crooks did a limited exam of his left knee, indicated that he had some arthritis, and offered a cortisone injection. He received that cortisone injection but notes his pain in the knee is still present. Previously, he has been seen by Dr. Yu Duran and has an upcoming surgery scheduled for the left shoulder. He is a well known patient to Dr. Angel Luis Jasso and has had cortisone injections to the left knee previously. Today, he reports his pain to the left knee is a 6-7/10 at rest and with activity, it increases to upwards of an 8-9/10. The pain is worse on the inside of the left knee than the outer knee. He denies any trauma. 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 REVIEW OF SYSTEMS:  No chest pain or shortness of breath. No fevers, chills, or night sweats. No rash and no itching. His pain is per his HPI. No nausea and no vomiting. He is not a diabetic. He has no allergies to Betadine. PHYSICAL EXAM:  He is a healthy-appearing, 58-year-old, , obese male atraumatic, normocephalic, alert and oriented times three sitting on the table comfortably. He lies supine with no difficulties. The left knee reveals no fracture, deformity, lesions, masses, or step-offs. He has no warmth, erythema, edema, or ecchymosis. He has a 1+ effusion.   He can actively flex his left knee while supine to 95°. The patella is tracking midline with crepitation noted. He has full extension. There is no instability on varus or valgus stressing. However, varus stressing does increase medial joint line discomfort. Hyperextension also reproduces medial joint line discomfort. There is no calf tenderness or evidence of DVGT. Distal sensation is intact fully to the left lower extremity. Capillary refill is brisk and less than 2 seconds to the left lower extremity. RADIOGRAPHS:  X-rays reviewed from Dr. Partha Deleon office taken one week ago reveals medial joint space narrowing associated with the left knee. There is minimal evidence of patellofemoral osteoarthritis. IMPRESSION:      1. Left knee pain. 2. Left knee medial joint space narrowing consistent with early osteoarthritis. 3. Left knee effusion. 4. Decreased range of motion of the left knee. PROCEDURE:  Today, using sterile technique, after verbal and written consent obtained and appropriate time out performed, 5 cc of 1% Lidocaine was used to anesthetize the left knee using the superolateral intra-articular approach. There were no complications. To follow, 8 cc of blood-tinted, yellow synovial fluid was removed from the same portal.  To follow, 2 mL of Kenalog at 40 mg per mL mixed with 8 mL of Bupivacaine 0.25% was reinjected There were no complications. PLAN:   I am currently recommending a left knee aspiration and injection today. I am going to go ahead and obtain an MRI of the left knee to assess any internal derangement understanding that he does have medial joint space narrowing, he does have underlying osteoarthritis. He is going to follow with our office once the imaging can be completed. Today, all his questions were answered to his satisfaction. His AVS was reviewed and provided.

## 2017-09-26 RX ORDER — LISINOPRIL AND HYDROCHLOROTHIAZIDE 12.5; 2 MG/1; MG/1
1 TABLET ORAL DAILY
Qty: 90 TAB | Refills: 1 | Status: SHIPPED | OUTPATIENT
Start: 2017-09-26 | End: 2018-03-26 | Stop reason: SDUPTHER

## 2017-09-26 NOTE — TELEPHONE ENCOUNTER
Called and informed spouse that per Dr Martins Pac patient can return to lisinopril hctz again. Understanding was voiced and spouse requests new rx be sent to [facility] on [address] [facility].

## 2017-09-26 NOTE — TELEPHONE ENCOUNTER
Patients wife called because the patient has had a headaches since Dr Danelle Dumont changed his medication from Lisinopril to Triamterene. He would like to know if he can go back to the Lisinopril please call to let him know [phone].

## 2017-10-06 ENCOUNTER — TELEPHONE (OUTPATIENT)
Dept: INTERNAL MEDICINE CLINIC | Age: 62
End: 2017-10-06

## 2017-10-06 NOTE — TELEPHONE ENCOUNTER
Called patient, per Dr. Gabrielle Zelaya, He stated he is feeling better today and I suggested that he calls us on Monday if worse. Dr. Gabrielle Zelaya would like to see him before prescribing for this condition.

## 2017-10-06 NOTE — TELEPHONE ENCOUNTER
Patient had surgery for ingrown toenail yesterday and states he is having leg cramps since having the surgery. Is there anything he can do to help with this?

## 2017-10-18 ENCOUNTER — TELEPHONE (OUTPATIENT)
Dept: INTERNAL MEDICINE CLINIC | Age: 62
End: 2017-10-18

## 2017-10-18 NOTE — TELEPHONE ENCOUNTER
Unable to reach patient or spouse--Per Dr Zaria Cates patient will need to be seen by either him or Berdie Jobs to get prescriptions as we haven't done them before and are unsure of reason why.

## 2017-10-18 NOTE — TELEPHONE ENCOUNTER
Patients wife called requesting these new medications.  Nystatin cream   Xanax 0.25 mg   Please call them to let them know [phone]

## 2017-10-19 RX ORDER — WARFARIN SODIUM 5 MG/1
TABLET ORAL
Qty: 75 TAB | Refills: 0 | Status: SHIPPED | OUTPATIENT
Start: 2017-10-19 | End: 2017-11-13 | Stop reason: SDUPTHER

## 2017-10-20 ENCOUNTER — OFFICE VISIT (OUTPATIENT)
Dept: INTERNAL MEDICINE CLINIC | Age: 62
End: 2017-10-20

## 2017-10-20 VITALS
RESPIRATION RATE: 16 BRPM | DIASTOLIC BLOOD PRESSURE: 78 MMHG | BODY MASS INDEX: 31.64 KG/M2 | HEART RATE: 81 BPM | HEIGHT: 70 IN | SYSTOLIC BLOOD PRESSURE: 120 MMHG | WEIGHT: 221 LBS | TEMPERATURE: 98.7 F | OXYGEN SATURATION: 96 %

## 2017-10-20 DIAGNOSIS — I10 ESSENTIAL HYPERTENSION: ICD-10-CM

## 2017-10-20 DIAGNOSIS — J01.00 ACUTE MAXILLARY SINUSITIS, RECURRENCE NOT SPECIFIED: Primary | ICD-10-CM

## 2017-10-20 RX ORDER — AZITHROMYCIN 250 MG/1
TABLET, FILM COATED ORAL
Qty: 6 TAB | Refills: 0 | Status: SHIPPED | OUTPATIENT
Start: 2017-10-20 | End: 2017-12-04 | Stop reason: ALTCHOICE

## 2017-10-20 NOTE — PROGRESS NOTES
Jory Phelan is a 57 y.o. male presenting today for Mouth Pain (tooth pulle monday)  . HPI:  Jory Phelan presents to the office today for mouth pain and sinus pain and pressure. Patient had the 1st molar removed with was involved removing three nerves. Patient is now complaining of maxillary sinus pain and pressure. He is negative for fever. Review of Systems   Constitutional: Negative for fever. HENT: Negative for congestion. Respiratory: Negative for cough. Cardiovascular: Negative for chest pain and palpitations. Allergies   Allergen Reactions    Augmentin [Amoxicillin-Pot Clavulanate] Itching    Penicillins Rash       Current Outpatient Prescriptions   Medication Sig Dispense Refill    azithromycin (ZITHROMAX) 250 mg tablet Take 2 tablets today, then take 1 tablet daily 6 Tab 0    warfarin (COUMADIN) 5 mg tablet TAKE 2 AND 1/2 TABLETS BY MOUTH DAILY OR AS DIRECTED 75 Tab 0    lisinopril-hydroCHLOROthiazide (PRINZIDE, ZESTORETIC) 20-12.5 mg per tablet Take 1 Tab by mouth daily. 90 Tab 1    B.infantis-B.ani-B.long-B.bifi (PROBIOTIC 4X) 10-15 mg TbEC Take  by mouth.  glucosamine-D3-Boswellia serr (OSTEO BI-FLEX, 5-LOXIN,) 1,500-400-100 mg-unit-mg tab Take  by mouth.  red yeast rice extract 600 mg cap Take 600 mg by mouth now.  metaxalone (SKELAXIN) 800 mg tablet Take 1 Tab by mouth three (3) times daily. Indications:  MUSCLE SPASM 30 Tab 2    montelukast (SINGULAIR) 10 mg tablet TAKE 1 TABLET BY MOUTH EVERY DAY 90 Tab 0    labetalol (NORMODYNE) 100 mg tablet TAKE ONE TABLET BY MOUTH TWICE DAILY 180 Tab 0    lansoprazole (PREVACID) 30 mg capsule TAKE 1 CAPSULE DAILY BEFOREBREAKFAST 90 Cap 3    butalbital-acetaminophen-caff (FIORICET) -40 mg per capsule Take 2 capsules every 8 hours as needed for headache 540 Cap 3    raNITIdine (ZANTAC) 150 mg tablet TK 1 T PO HS  5    acetaminophen (TYLENOL) 500 mg tablet Take  by mouth every six (6) hours as needed for Pain.  triamterene-hydroCHLOROthiazide (DYAZIDE) 37.5-25 mg per capsule 1 tablet each AM 30 Cap 3       Past Medical History:   Diagnosis Date    Arthritis     Bleeding     Blood clot in the legs     Esophageal reflux     Headache(784.0)     migraine    High cholesterol     Hypertension     Lower back pain 11/6/2010    Other chest pain     Personal history of venous thrombosis and embolism     Pure hypercholesterolemia     Right buttock pain 11/6/2010    Right foot pain     Spinal stenosis     Tendonitis, tibialis     anterior    Thromboembolus (HCC)        Past Surgical History:   Procedure Laterality Date    FOOT/TOES SURGERY PROC UNLISTED      HX BACK SURGERY      HX ORTHOPAEDIC  06-25-12    Right foot with excision of bursa and adipose tissue from fifth metatarsal base by Dr. Carlos Vargas      lower back (1992 and 2000)    HX OTHER SURGICAL      left foot (2008)    LAMINOTOMY      NERVE BLOCK         Social History     Social History    Marital status:      Spouse name: N/A    Number of children: N/A    Years of education: N/A     Occupational History    Not on file. Social History Main Topics    Smoking status: Never Smoker    Smokeless tobacco: Never Used    Alcohol use No    Drug use: No    Sexual activity: Not Currently     Other Topics Concern    Not on file     Social History Narrative       Patient does not have an advanced directive on file    Vitals:    10/20/17 1605   BP: 120/78   Pulse: 81   Resp: 16   Temp: 98.7 °F (37.1 °C)   TempSrc: Tympanic   SpO2: 96%   Weight: 221 lb (100.2 kg)   Height: 5' 10\" (1.778 m)   PainSc:   4   PainLoc: Mouth       Physical Exam   HENT:   Mouth/Throat:       Cardiovascular: Normal rate and regular rhythm. Pulmonary/Chest: Effort normal and breath sounds normal.   Nursing note and vitals reviewed.       Hospital Outpatient Visit on 09/02/2017   Component Date Value Ref Range Status    Prothrombin time 09/02/2017 20.9* 11.5 - 15.2 sec Final    INR 09/02/2017 1.9* 0.8 - 1.2   Final    Comment:            INR Therapeutic Ranges         (on stable oral anticoagulant):     INDICATION                INR  DVT/PE/Atrial Fib          2.0-3.0  MI/Mechanical Heart Valve  2.5-3.5      WBC 09/02/2017 5.4  4.6 - 13.2 K/uL Final    RBC 09/02/2017 4.29* 4.70 - 5.50 M/uL Final    HGB 09/02/2017 14.1  13.0 - 16.0 g/dL Final    HCT 09/02/2017 41.6  36.0 - 48.0 % Final    MCV 09/02/2017 97.0  74.0 - 97.0 FL Final    MCH 09/02/2017 32.9  24.0 - 34.0 PG Final    MCHC 09/02/2017 33.9  31.0 - 37.0 g/dL Final    RDW 09/02/2017 13.0  11.6 - 14.5 % Final    PLATELET 40/64/7432 262  135 - 420 K/uL Final    MPV 09/02/2017 11.1  9.2 - 11.8 FL Final    NEUTROPHILS 09/02/2017 61  40 - 73 % Final    LYMPHOCYTES 09/02/2017 23  21 - 52 % Final    MONOCYTES 09/02/2017 13* 3 - 10 % Final    EOSINOPHILS 09/02/2017 2  0 - 5 % Final    BASOPHILS 09/02/2017 1  0 - 2 % Final    ABS. NEUTROPHILS 09/02/2017 3.4  1.8 - 8.0 K/UL Final    ABS. LYMPHOCYTES 09/02/2017 1.2  0.9 - 3.6 K/UL Final    ABS. MONOCYTES 09/02/2017 0.7  0.05 - 1.2 K/UL Final    ABS. EOSINOPHILS 09/02/2017 0.1  0.0 - 0.4 K/UL Final    ABS. BASOPHILS 09/02/2017 0.0  0.0 - 0.06 K/UL Final    DF 09/02/2017 AUTOMATED    Final    LIPID PROFILE 09/02/2017        Final    Cholesterol, total 09/02/2017 235* <200 MG/DL Final    Triglyceride 09/02/2017 257* <150 MG/DL Final    Comment: The drugs N-acetylcysteine (NAC) and  Metamiszole have been found to cause falsely  low results in this chemical assay. Please  be sure to submit blood samples obtained  BEFORE administration of either of these  drugs to assure correct results.       HDL Cholesterol 09/02/2017 48  40 - 60 MG/DL Final    LDL, calculated 09/02/2017 135.6* 0 - 100 MG/DL Final    VLDL, calculated 09/02/2017 51.4  MG/DL Final    CHOL/HDL Ratio 09/02/2017 4.9  0 - 5.0   Final    Sodium 09/02/2017 141  136 - 145 mmol/L Final    Potassium 09/02/2017 3.9  3.5 - 5.5 mmol/L Final    Chloride 09/02/2017 106  100 - 108 mmol/L Final    CO2 09/02/2017 29  21 - 32 mmol/L Final    Anion gap 09/02/2017 6  3.0 - 18 mmol/L Final    Glucose 09/02/2017 102* 74 - 99 mg/dL Final    BUN 09/02/2017 16  7.0 - 18 MG/DL Final    Creatinine 09/02/2017 0.92  0.6 - 1.3 MG/DL Final    BUN/Creatinine ratio 09/02/2017 17  12 - 20   Final    GFR est AA 09/02/2017 >60  >60 ml/min/1.73m2 Final    GFR est non-AA 09/02/2017 >60  >60 ml/min/1.73m2 Final    Comment: (NOTE)  Estimated GFR is calculated using the Modification of Diet in Renal   Disease (MDRD) Study equation, reported for both  Americans   (GFRAA) and non- Americans (GFRNA), and normalized to 1.73m2   body surface area. The physician must decide which value applies to   the patient. The MDRD study equation should only be used in   individuals age 25 or older. It has not been validated for the   following: pregnant women, patients with serious comorbid conditions,   or on certain medications, or persons with extremes of body size,   muscle mass, or nutritional status.  Calcium 09/02/2017 8.4* 8.5 - 10.1 MG/DL Final    Bilirubin, total 09/02/2017 0.3  0.2 - 1.0 MG/DL Final    ALT (SGPT) 09/02/2017 28  16 - 61 U/L Final    AST (SGOT) 09/02/2017 19  15 - 37 U/L Final    Alk. phosphatase 09/02/2017 67  45 - 117 U/L Final    Protein, total 09/02/2017 6.2* 6.4 - 8.2 g/dL Final    Albumin 09/02/2017 3.5  3.4 - 5.0 g/dL Final    Globulin 09/02/2017 2.7  2.0 - 4.0 g/dL Final    A-G Ratio 09/02/2017 1.3  0.8 - 1.7   Final   Anti-Coag visit on 07/28/2017   Component Date Value Ref Range Status    VALID INTERNAL CONTROL POC 07/28/2017 Yes   Final    INR POC 07/28/2017 2.1   Final       .No results found for any visits on 10/20/17. Assessment / Plan:      ICD-10-CM ICD-9-CM    1.  Acute maxillary sinusitis, recurrence not specified J01.00 461.0 azithromycin (ZITHROMAX) 250 mg tablet   2. Essential hypertension I10 401.9      Zpak rx  HTN- controlled  F/u prn      Follow-up Disposition:  Return if symptoms worsen or fail to improve. I asked the patient if he  had any questions and answered his  questions.   The patient stated that he understands the treatment plan and agrees with the treatment plan

## 2017-10-20 NOTE — PROGRESS NOTES
1. Have you been to the ER, urgent care clinic since your last visit? Hospitalized since your last visit? No    2. Have you seen or consulted any other health care providers outside of the [address] [place] [address] since your last visit? Include any pap smears or colon screening.  Oct 16 Dr Debi Ralph got tooth pulled

## 2017-10-23 RX ORDER — NYSTATIN 100000 U/G
CREAM TOPICAL 2 TIMES DAILY
Qty: 15 G | Refills: 0 | Status: SHIPPED | OUTPATIENT
Start: 2017-10-23 | End: 2018-04-30 | Stop reason: ALTCHOICE

## 2017-11-08 RX ORDER — MONTELUKAST SODIUM 10 MG/1
TABLET ORAL
Qty: 90 TAB | Refills: 0 | Status: SHIPPED | OUTPATIENT
Start: 2017-11-08 | End: 2018-02-05 | Stop reason: SDUPTHER

## 2017-11-13 RX ORDER — WARFARIN SODIUM 5 MG/1
TABLET ORAL
Qty: 75 TAB | Refills: 0 | Status: SHIPPED | OUTPATIENT
Start: 2017-11-13 | End: 2018-03-16

## 2017-11-13 RX ORDER — MONTELUKAST SODIUM 10 MG/1
TABLET ORAL
Qty: 90 TAB | Refills: 0 | Status: SHIPPED | OUTPATIENT
Start: 2017-11-13 | End: 2018-01-18 | Stop reason: SDUPTHER

## 2017-11-13 RX ORDER — WARFARIN SODIUM 5 MG/1
TABLET ORAL
Qty: 75 TAB | Refills: 0 | Status: SHIPPED | OUTPATIENT
Start: 2017-11-13 | End: 2017-12-22 | Stop reason: SDUPTHER

## 2017-11-15 ENCOUNTER — ANTI-COAG VISIT (OUTPATIENT)
Dept: INTERNAL MEDICINE CLINIC | Age: 62
End: 2017-11-15

## 2017-11-15 DIAGNOSIS — Z79.01 WARFARIN ANTICOAGULATION: ICD-10-CM

## 2017-11-15 DIAGNOSIS — Z86.718 PERSONAL HISTORY OF VENOUS THROMBOSIS AND EMBOLISM: ICD-10-CM

## 2017-11-15 LAB
INR BLD: 2.6
PT POC: NORMAL SECONDS
VALID INTERNAL CONTROL?: YES

## 2017-11-15 NOTE — PROGRESS NOTES
Mr. Gerhardt Saras is here today for anticoagulation monitoring for the diagnosis of Atrial Fibrillation. His INR goal is 2.0-3.0 and his current Coumadin dose is 82.5 mg weekly. Today's findings include an INR of 2.6 (normal INR range 0.8-1.2). Considering Mr. Winters's past history, todays findings, and per the coumadin policy/protocol, Mr. Chris Kirkland was instructed to take Coumadin as follows,  Continue same dose. He was also instructed to schedule an appointment in 4 weeks prior to leaving for an INR check. A full discussion of the nature of anticoagulants has been carried out. A full discussion of the need for frequent and regular monitoring, precise dosage adjustment and compliance was stressed. Side effects of potential bleeding were discussed and Mr. Chris Kirkland was instructed to call [phone] if there are any signs of abnormal bleeding. Mr. Chris Kirkland was instructed to avoid any OTC items containing aspirin or ibuprofen and prior to starting any new OTC products to consult with his physician or pharmacist to ensure no drug interactions are present. Mr. Chris Kirkland was instructed to avoid any major changes in his general diet and to avoid alcohol consumption. .    Mr. Chris Kirkland was provided a literature booklet, \"Treatment with Warfarin (Coumadin)\", that includes topics on understanding coumadin therapy, drug interaction considerations, vitamin K and coumadin use, interactions with foods and supplements containing vitamin K, and the use of herbal products. Mr. Crhis Kirkland verbalized his understanding of all instructions and will call the office with any questions, concerns, or signs of abnormal bleeding or blood clot.

## 2017-11-30 ENCOUNTER — DOCUMENTATION ONLY (OUTPATIENT)
Dept: ORTHOPEDIC SURGERY | Facility: CLINIC | Age: 62
End: 2017-11-30

## 2017-11-30 ENCOUNTER — TELEPHONE (OUTPATIENT)
Dept: ORTHOPEDIC SURGERY | Facility: CLINIC | Age: 62
End: 2017-11-30

## 2017-11-30 NOTE — TELEPHONE ENCOUNTER
Patient's wife Lucrecia Boon called stating the patient has a new patient appointment with Dr. Win Fragoso scheduled for 01/11/18 and she has a couple of questions. She is wondering if it is okay for the patient to get an MRI done before he comes to see Dr. Win Fragoso, and if it is okay, where should he go to get the MRI done? She states the patient has seen Dr. Jeovany Ruffin in the past and is wondering whether he would be able to get an MRI ordered through Dr. Jeovany Ruffin even though he wouldn't be following up with him and instead would be going to Dr. Win Fragoso. She is requesting a call back regarding this as soon as possible. Please advise Susan at [phone].

## 2017-11-30 NOTE — PROGRESS NOTES
Dr. Cherie Mccullough office called to refer patient to be seen for left knee pain. They were advised that the patient was seen here in September for same and we ordered an MRI. Patient was to follow up after for further plan of care. Dr. Cherie Mccullough office will contact patient to find out if MRI performed and will have patient follow up with us.

## 2017-12-04 ENCOUNTER — OFFICE VISIT (OUTPATIENT)
Dept: ORTHOPEDIC SURGERY | Age: 62
End: 2017-12-04

## 2017-12-04 VITALS
BODY MASS INDEX: 31.64 KG/M2 | HEIGHT: 70 IN | HEART RATE: 82 BPM | RESPIRATION RATE: 18 BRPM | TEMPERATURE: 97.8 F | SYSTOLIC BLOOD PRESSURE: 139 MMHG | WEIGHT: 221 LBS | DIASTOLIC BLOOD PRESSURE: 70 MMHG

## 2017-12-04 DIAGNOSIS — M51.36 DDD (DEGENERATIVE DISC DISEASE), LUMBAR: Primary | ICD-10-CM

## 2017-12-04 DIAGNOSIS — M47.816 LUMBAR SPONDYLOSIS: ICD-10-CM

## 2017-12-04 DIAGNOSIS — Z79.01 CHRONIC ANTICOAGULATION: ICD-10-CM

## 2017-12-04 DIAGNOSIS — M17.0 OSTEOARTHRITIS OF BOTH KNEES, UNSPECIFIED OSTEOARTHRITIS TYPE: ICD-10-CM

## 2017-12-04 DIAGNOSIS — M62.838 MUSCLE SPASM: ICD-10-CM

## 2017-12-04 RX ORDER — METHYLPREDNISOLONE 4 MG/1
TABLET ORAL
Qty: 1 DOSE PACK | Refills: 0 | Status: SHIPPED | OUTPATIENT
Start: 2017-12-04 | End: 2018-01-18 | Stop reason: ALTCHOICE

## 2017-12-04 RX ORDER — HYDROCODONE BITARTRATE AND ACETAMINOPHEN 5; 325 MG/1; MG/1
TABLET ORAL
Refills: 0 | COMMUNITY
Start: 2017-10-16 | End: 2017-12-11 | Stop reason: SDUPTHER

## 2017-12-04 NOTE — MR AVS SNAPSHOT
Visit Information Date & Time Provider Department Dept. Phone Encounter #  
 12/4/2017  3:15 PM Tin Younger MD [facility] [phone] [id number] Follow-up Instructions Return in about 3 months (around 3/4/2018) for Medication follow up. Your Appointments 1/11/2018  7:40 AM  
New Patient with Vivica Klinefelter, MD  
[address] [facility], [address] [facility]) Appt Note: /  
 [address], [address] [place] [address]  
[phone]  
  
   
 [address], [address] [facility] [address] Upcoming Health Maintenance Date Due Hepatitis C Screening 1955 ZOSTER VACCINE AGE 60> 2/13/2013 Influenza Age 5 to Adult 8/1/2017 DTaP/Tdap/Td series (2 - Td) 12/6/2024 COLONOSCOPY 5/27/2026 Allergies as of 12/4/2017  Review Complete On: 12/4/2017 By: Tin Younger MD  
  
 Severity Noted Reaction Type Reactions Augmentin [Amoxicillin-pot Clavulanate]    Itching Penicillins    Rash Current Immunizations  Reviewed on 5/26/2017 Name Date Tdap 12/6/2014 Not reviewed this visit You Were Diagnosed With   
  
 Codes Comments DDD (degenerative disc disease), lumbar    -  Primary ICD-10-CM: M51.36 
ICD-9-CM: 722.52 Lumbar spondylosis     ICD-10-CM: M47.816 ICD-9-CM: 851. 3 Chronic anticoagulation     ICD-10-CM: Z79.01 
ICD-9-CM: V58.61 Vitals BP Pulse Temp Resp Height(growth percentile) Weight(growth percentile) 139/70 82 97.8 °F (36.6 °C) 18 5' 10\" (1.778 m) 221 lb (100.2 kg) BMI Smoking Status 31.71 kg/m2 Never Smoker BMI and BSA Data Body Mass Index Body Surface Area 31.71 kg/m 2 2.22 m 2 Preferred Pharmacy Pharmacy Name Phone [facility] [address] [facility] [phone] Your Updated Medication List  
  
   
 This list is accurate as of: 12/4/17  4:31 PM.  Always use your most recent med list.  
  
  
  
  
 acetaminophen 500 mg tablet Commonly known as:  TYLENOL Take  by mouth every six (6) hours as needed for Pain. butalbital-acetaminophen-caff -40 mg per capsule Commonly known as:  [employer] Take 2 capsules every 8 hours as needed for headache HYDROcodone-acetaminophen 5-325 mg per tablet Commonly known as:  Hilda Smart TK 1 T PO Q 6 H PRN FOR PAIN  
  
 labetalol 100 mg tablet Commonly known as:  NORMODYNE  
TAKE ONE TABLET BY MOUTH TWICE DAILY  
  
 lansoprazole 30 mg capsule Commonly known as:  PREVACID TAKE 1 CAPSULE DAILY BEFOREBREAKFAST  
  
 lisinopril-hydroCHLOROthiazide 20-12.5 mg per tablet Commonly known as:  Thuan Reeks Take 1 Tab by mouth daily. metaxalone 800 mg tablet Commonly known as:  SKELAXIN Take 1 Tab by mouth three (3) times daily. Indications: MUSCLE SPASM  
  
 methylPREDNISolone 4 mg tablet Commonly known as:  Trevin Hodges Per dose pack instructions * montelukast 10 mg tablet Commonly known as:  SINGULAIR  
TAKE 1 TABLET BY MOUTH EVERY DAY  
  
 * montelukast 10 mg tablet Commonly known as:  SINGULAIR  
TAKE 1 TABLET BY MOUTH EVERY DAY  
  
 nystatin topical cream  
Commonly known as:  MYCOSTATIN Apply  to affected area two (2) times a day. OSTEO BI-FLEX (5-LOXIN) 1,500-400-100 mg-unit-mg Tab Generic drug:  glucosamine-D3-Boswellia serr Take  by mouth. PROBIOTIC 4X 10-15 mg Tbec Generic drug:  B.infantis-B.ani-B.long-B.bifi Take  by mouth. raNITIdine 150 mg tablet Commonly known as:  ZANTAC TK 1 T PO HS  
  
 red yeast rice extract 600 mg Cap Take 600 mg by mouth now.  
  
 triamterene-hydroCHLOROthiazide 37.5-25 mg per capsule Commonly known as:  DYAZIDE  
1 tablet each AM  
  
 * warfarin 5 mg tablet Commonly known as:  COUMADIN  
TAKE 2 AND 1/2 TABLETS BY MOUTH DAILY OR AS DIRECTED  
 * warfarin 5 mg tablet Commonly known as:  COUMADIN  
TAKE 2 AND 1/2 TABLETS BY MOUTH DAILY OR AS DIRECTED * Notice: This list has 4 medication(s) that are the same as other medications prescribed for you. Read the directions carefully, and ask your doctor or other care provider to review them with you. Prescriptions Sent to Pharmacy Refills  
 methylPREDNISolone (MEDROL DOSEPACK) 4 mg tablet 0 Sig: Per dose pack instructions Class: Normal  
 Pharmacy: [facility] [address] [place] [address] [address] [place] [address] #: [phone] Follow-up Instructions Return in about 3 months (around 3/4/2018) for Medication follow up. Patient Instructions Low Back Arthritis: Exercises Your Care Instructions Here are some examples of typical rehabilitation exercises for your condition. Start each exercise slowly. Ease off the exercise if you start to have pain. Your doctor or physical therapist will tell you when you can start these exercises and which ones will work best for you. When you are not being active, find a comfortable position for rest. Some people are comfortable on the floor or a medium-firm bed with a small pillow under their head and another under their knees. Some people prefer to lie on their side with a pillow between their knees. Don't stay in one position for too long. Take short walks (10 to 20 minutes) every 2 to 3 hours. Avoid slopes, hills, and stairs until you feel better. Walk only distances you can manage without pain, especially leg pain. How to do the exercises Pelvic tilt 1. Lie on your back with your knees bent. 2. \"Brace\" your stomach-tighten your muscles by pulling in and imagining your belly button moving toward your spine. 3. Press your lower back into the floor. You should feel your hips and pelvis rock back. 4. Hold for 6 seconds while breathing smoothly. 5. Relax and allow your pelvis and hips to rock forward. 6. Repeat 8 to 12 times. Back stretches 1. Get down on your hands and knees on the floor. 2. Relax your head and allow it to droop. Round your back up toward the ceiling until you feel a nice stretch in your upper, middle, and lower back. Hold this stretch for as long as it feels comfortable, or about 15 to 30 seconds. 3. Return to the starting position with a flat back while you are on your hands and knees. 4. Let your back sway by pressing your stomach toward the floor. Lift your buttocks toward the ceiling. 5. Hold this position for 15 to 30 seconds. 6. Repeat 2 to 4 times. Follow-up care is a key part of your treatment and safety. Be sure to make and go to all appointments, and call your doctor if you are having problems. It's also a good idea to know your test results and keep a list of the medicines you take. Where can you learn more? Go to http://rivka-jarrell.info/. Enter V578 in the search box to learn more about \"Low Back Arthritis: Exercises. \" Current as of: March 21, 2017 Content Version: 11.4 © 1499-3126 [employer]. Care instructions adapted under license by [employer] (which disclaims liability or warranty for this information). If you have questions about a medical condition or this instruction, always ask your healthcare professional. [place] [address] any warranty or liability for your use of this information. Introducing hospitals & HEALTH SERVICES! Melissa Finch introduces [employer] patient portal. Now you can access parts of your medical record, email your doctor's office, and request medication refills online. 1. In your internet browser, go to [email or web address] 2. Click on the First Time User? Click Here link in the Sign In box. You will see the New Member Sign Up page. 3. Enter your [employer] Access Code exactly as it appears below.  You will not need to use this code after youve completed the sign-up process. If you do not sign up before the expiration date, you must request a new code. · [employer] Access Code: [email or web address] Expires: 2/13/2018 12:24 PM 
 
4. Enter the last four digits of your Social Security Number (xxxx) and Date of Birth (mm/dd/yyyy) as indicated and click Submit. You will be taken to the next sign-up page. 5. Create a GIVINGtrax ID. This will be your GIVING[employer] login ID and cannot be changed, so think of one that is secure and easy to remember. 6. Create a [employer] password. You can change your password at any time. 7. Enter your Password Reset Question and Answer. This can be used at a later time if you forget your password. 8. Enter your e-mail address. You will receive e-mail notification when new information is available in [address]. 9. Click Sign Up. You can now view and download portions of your medical record. 10. Click the Download Summary menu link to download a portable copy of your medical information. If you have questions, please visit the Frequently Asked Questions section of the [employer] website. Remember, [employer] is NOT to be used for urgent needs. For medical emergencies, dial 911. Now available from your iPhone and Android! Please provide this summary of care documentation to your next provider. Your primary care clinician is listed as Manuel Carr. If you have any questions after today's visit, please call [phone].

## 2017-12-04 NOTE — PATIENT INSTRUCTIONS

## 2017-12-04 NOTE — PROGRESS NOTES
[facility]  Kitty Fontana., [address] [place] [address], [facility] [address]  Phone: [phone]  Fax: [phone]      ASSESSMENT   Diagnoses and all orders for this visit:    1. DDD (degenerative disc disease), lumbar    2. Lumbar spondylosis  -     methylPREDNISolone (MEDROL DOSEPACK) 4 mg tablet; Per dose pack instructions    3. Chronic anticoagulation    4. Muscle spasm    5. Osteoarthritis of both knees, unspecified osteoarthritis type       IMPRESSION AND PLAN:  Samaria Heard is a 57 y.o. male with history of lumbar pain. Pt complains of 2/10 lower back pain. He also complains of pain in both knees, L>R, and will follow up with Dr. Patrick Silva on 01/11/2018. Pt continues to take Skelaxin 800 mg and does not need a refill at this time. 1) Pt was given information on lumbar arthritis exercises. 2 He was prescribed a Medrol Dosepak prn severe pain. 3) Pt is not a candidate for NSAID's due to chronic anticoagulation with Coumadin. 4) I strongly encouraged the patient ambulate with the assistance of a cane. 5) Mr. José Luis Flores has a reminder for a \"due or due soon\" health maintenance. I have asked that he contact his primary care provider, Annalisa Colin MD, for follow-up on this health maintenance. 6)  demonstrated consistency with prescribing. 7) Pt will follow-up in 3 months or sooner if needed. HISTORY OF PRESENT ILLNESS:  Samaria Heard is a 57 y.o. male with history of lumbar pain. Pt complains of 2/10 lower back pain. He continues to use Skelaxin and an occasional Medrol Dose Pack for his pain and muscle spasm. He continues to take Coumadin. He denies any increase or change in his lower back pain. He also denies any new radicular symptoms or weakness.    He reports pain in both knees, L>R, and has been followed by Dr. Rocky Bhardwaj in the past. He received a left knee injection with CARA Garcia on 09/21/2017 and has an appointment on 01/11/2018 with  Jacob for surgical evaluation. Pt continues to take Skelaxin 800 mg and does not need a refill at this time. Pt reports that he has a torn rotator cuff on the left and will need surgery in the near future. Pt at this time desires to continue with current care. Pain Scale: 1/10    PCP: Lu Maldonado MD       Past Medical History:   Diagnosis Date    Arthritis     Bleeding     Blood clot in the legs     Esophageal reflux     Headache(784.0)     migraine    High cholesterol     Hypertension     Lower back pain 11/6/2010    Other chest pain     Personal history of venous thrombosis and embolism     Pure hypercholesterolemia     Right buttock pain 11/6/2010    Right foot pain     Spinal stenosis     Tendonitis, tibialis     anterior    Thromboembolus ([address]         Social History     Social History    Marital status:      Spouse name: N/A    Number of children: N/A    Years of education: N/A     Occupational History    Not on file. Social History Main Topics    Smoking status: Never Smoker    Smokeless tobacco: Never Used    Alcohol use No    Drug use: No    Sexual activity: Not Currently     Other Topics Concern    Not on file     Social History Narrative       Current Outpatient Prescriptions   Medication Sig Dispense Refill    methylPREDNISolone (MEDROL DOSEPACK) 4 mg tablet Per dose pack instructions 1 Dose Pack 0    warfarin (COUMADIN) 5 mg tablet TAKE 2 AND 1/2 TABLETS BY MOUTH DAILY OR AS DIRECTED 75 Tab 0    montelukast (SINGULAIR) 10 mg tablet TAKE 1 TABLET BY MOUTH EVERY DAY 90 Tab 0    warfarin (COUMADIN) 5 mg tablet TAKE 2 AND 1/2 TABLETS BY MOUTH DAILY OR AS DIRECTED 75 Tab 0    montelukast (SINGULAIR) 10 mg tablet TAKE 1 TABLET BY MOUTH EVERY DAY 90 Tab 0    nystatin (MYCOSTATIN) topical cream Apply  to affected area two (2) times a day. 15 g 0    lisinopril-hydroCHLOROthiazide (PRINZIDE, ZESTORETIC) 20-12.5 mg per tablet Take 1 Tab by mouth daily.  90 Tab 1  B.infantis-B.ani-B.long-B.bifi (PROBIOTIC 4X) 10-15 mg TbEC Take  by mouth.  glucosamine-D3-Boswellia serr (OSTEO BI-FLEX, 5-LOXIN,) 1,500-400-100 mg-unit-mg tab Take  by mouth.  red yeast rice extract 600 mg cap Take 600 mg by mouth now.  metaxalone (SKELAXIN) 800 mg tablet Take 1 Tab by mouth three (3) times daily. Indications: MUSCLE SPASM 30 Tab 2    labetalol (NORMODYNE) 100 mg tablet TAKE ONE TABLET BY MOUTH TWICE DAILY 180 Tab 0    lansoprazole (PREVACID) 30 mg capsule TAKE 1 CAPSULE DAILY BEFOREBREAKFAST 90 Cap 3    butalbital-acetaminophen-caff (FIORICET) -40 mg per capsule Take 2 capsules every 8 hours as needed for headache 540 Cap 3    raNITIdine (ZANTAC) 150 mg tablet TK 1 T PO HS  5    acetaminophen (TYLENOL) 500 mg tablet Take  by mouth every six (6) hours as needed for Pain.  HYDROcodone-acetaminophen (NORCO) 5-325 mg per tablet TK 1 T PO Q 6 H PRN FOR PAIN  0    triamterene-hydroCHLOROthiazide (DYAZIDE) 37.5-25 mg per capsule 1 tablet each AM 30 Cap 3       Allergies   Allergen Reactions    Augmentin [Amoxicillin-Pot Clavulanate] Itching    Penicillins Rash         REVIEW OF SYSTEMS    Constitutional: Negative for fever, chills, or weight change. Respiratory: Negative for cough or shortness of breath. Cardiovascular: Negative for chest pain or palpitations. Gastrointestinal: Negative for acid reflux, change in bowel habits, or constipation. Genitourinary: Negative for dysuria and flank pain. Musculoskeletal: Positive for lumbar pain. Skin: Negative for rash. Neurological: Negative for headaches, dizziness, or numbness. Endo/Heme/Allergies: Negative for increased bruising. Psychiatric/Behavioral: Negative for difficulty with sleep.       PHYSICAL EXAMINATION  Visit Vitals    /70    Pulse 82    Temp 97.8 °F (36.6 °C)    Resp 18    Ht 5' 10\" (1.778 m)    Wt 221 lb (100.2 kg)    BMI 31.71 kg/m2       Constitutional: Awake, alert, and in no acute distress - some difficulty transitioning sit to stand and with ambulation  Neurological: 1+ symmetrical DTRs in the upper extremities. 1+ symmetrical DTRs in the lower extremities. Sensation to light touch is intact. Negative Eliecer's sign bilaterally. Skin: warm, dry, and intact. Musculoskeletal: Minimal Tenderness to palpation in the lower lumbar region. Moderate pain with extension and axial loading. No pain with internal or external rotation of his hips. Negative straight leg raise bilaterally. Biceps  Triceps Deltoids Wrist Ext Wrist Flex Hand Intrin   Right +4/5 +4/5 +4/5 +4/5 +4/5 +4/5   Left +4/5 +4/5 +4/5 +4/5 +4/5 +4/5      Hip Flex  Quads Hamstrings Ankle DF EHL Ankle PF   Right +4/5 +4/5 +4/5 +4/5 +4/5 +4/5   Left +4/5  4/5  4/5 +4/5 +4/5 +4/5     IMAGING:    Bilateral knee x-rays from 03/14/2017 were personally reviewed with the patient and demonstrated:  Medial compartment narrowing bilaterally, L>R    Cervical Spine MRI from 12/19/2016 was personally reviewed with the Pt and demonstrated:  Results from Hospital Encounter on 12/19/16   MRI CERV SPINE WO CONT     Narrative MRI of cervical spine without contrast    HISTORY: Chronic progressive neck pain with headaches. COMPARISON: No prior MRI. Cervical spine radiographs from 12/1/2016. TECHNIQUE: T1 weighted, T2 FSE, FSE inversion recovery sagittal images are  supplemented by T2 weighted and GRE/medic axial images. FINDINGS: Normal alignment and vertebral body heights. Normal marrow signal  except for mild endplate degenerative change. Cervical cord is normal in signal  and caliber. Visualized posterior fossa contents look normal. Paraspinal soft  tissues look normal.    C2-3: No significant degenerative disc disease or spinal stenosis. C3-4: Small nonfocal disc protrusion which contacts the ventral cord and causes  borderline spinal stenosis. No foraminal stenosis. C4-5: No significant degenerative disc disease.  Mildly hypertrophic facets. No  spinal stenosis. C5-6: Disc is narrowed with diffusely bulging disc and osteophyte complex. Facets are mildly hypertrophic. Mild spinal canal and moderate left foraminal  stenoses. C6-7: Disc is narrowed with diffusely bulging disc and osteophyte complex. Facets are mildly hypertrophic. Mild spinal canal and moderate bilateral  foraminal stenoses. C7-T1: No significant degenerative disc disease or spinal stenosis.               Impression Impression:    Disc osteophyte complexes causing mild spinal canal stenoses at C5-6 and C6-7. Moderate left foraminal stenosis at C5-6 and moderate bilateral foraminal  stenoses at C6-7.               Lumbar Spine MRI from 1/23/2013 was personally reviewed with the Pt and demonstrated:  Final result (Exam End: 1/23/2013  6:57 PM) Open      Study Result   MRI lumbar spine with and without contrast      Comparison: August 1, 2011      Clinical information: History of prior back surgery, now with right-sided pain.      Procedure:      MRI of the lumbar spine was performed prior to and following the uneventful  administration of 20 cc of gadolinium intravenously. Sequences included:  Sagittal T1, sagittal T1 postcontrast, sagittal inversion recovery, sagittal  T2, axial T1, axial T1 postcontrast, and axial T2 with fat saturation.      Findings:      Vertebral body heights are maintained. There are mild degenerative endplate  changes at Q0-T7. No evidence for fracture. Alignment is anatomic, without  listhesis.     There is mild disc narrowing at L4-5 and moderate disc narrowing at L5-S1 with  desiccation.      The conus terminates at the L1 level.      Susceptibility artifact present in the soft tissues of the lower back  consistent with prior surgical intervention.      Correlation of axial and sagittal images reveals the following:      At L1-L2: No significant disc pathology or proliferative changes.  No central  canal or foraminal stenosis.     At L2-L3: No significant disc pathology or proliferative changes. No central  canal or foraminal stenosis.     At L3-L4: Mild bulging of the posterolateral disc corners. Mild facet  arthropathy and ligamentous buckling. The canal is patent. Mild right greater  than left foraminal narrowing. Minimal progression.      At L4-L5: Prior left hemilaminectomy. Mild circumferential disc bulge,  eccentric along the posterior right disc margin. Mild facet arthropathy. Moderate right ligamentous hypertrophy. There is mild narrowing of the lateral  recesses. Mild central canal narrowing. Moderate right and mild left foraminal  narrowing. Findings look overall unchanged.      At L5-S1: Prior right hemilaminectomies. Mild posterior disc bulge/osteophyte  complex. Moderate facet arthropathy. Canal is patent. Moderate bilateral  foraminal narrowing. Findings look stable.      Visualized portions of the sacroiliac joints are unremarkable. Incidentally  imaged retroperitoneal structures are unremarkable as well.          IMPRESSION:  Postsurgical change at L4-5 and L5-S1. Central canal narrowing at L4-5 and  foraminal stenosis at L4-5/L5-S1 appear stable when compared to prior study.      Minimal progression of mild degenerative changes at L3-4. Written by Trenna Cooks, as dictated by Leonard Vo MD.  I, Dr. Leonard Vo confirm that all documentation is accurate.

## 2017-12-11 ENCOUNTER — ANTI-COAG VISIT (OUTPATIENT)
Dept: INTERNAL MEDICINE CLINIC | Age: 62
End: 2017-12-11

## 2017-12-11 DIAGNOSIS — Z79.01 WARFARIN ANTICOAGULATION: ICD-10-CM

## 2017-12-11 DIAGNOSIS — Z86.718 PERSONAL HISTORY OF VENOUS THROMBOSIS AND EMBOLISM: ICD-10-CM

## 2017-12-11 LAB
INR BLD: 2.7
PT POC: NORMAL SECONDS
VALID INTERNAL CONTROL?: YES

## 2017-12-11 RX ORDER — HYDROCODONE BITARTRATE AND ACETAMINOPHEN 5; 325 MG/1; MG/1
TABLET ORAL
Qty: 12 TAB | Refills: 0 | Status: SHIPPED | OUTPATIENT
Start: 2017-12-11 | End: 2018-02-01 | Stop reason: ALTCHOICE

## 2017-12-11 NOTE — PROGRESS NOTES
Mr. Ann Davidson is here today for anticoagulation monitoring for the diagnosis of Atrial Fibrillation. His INR goal is 2.0-3.0 and his current Coumadin dose is 82.5 mg weekly. Today's findings include an INR of 2.7 (normal INR range 0.8-1.2). Considering Mr. Winters's past history, todays findings, and per the coumadin policy/protocol, Mr. Latasha Maier was instructed to take Coumadin as follows,  Continue same dose. He was also instructed to schedule an appointment in 2 weeks prior to leaving for an INR check. A full discussion of the nature of anticoagulants has been carried out. A full discussion of the need for frequent and regular monitoring, precise dosage adjustment and compliance was stressed. Side effects of potential bleeding were discussed and Mr. Latasha Maier was instructed to call [phone] if there are any signs of abnormal bleeding. Mr. Latasha Maier was instructed to avoid any OTC items containing aspirin or ibuprofen and prior to starting any new OTC products to consult with his physician or pharmacist to ensure no drug interactions are present. Mr. Latasha Maier was instructed to avoid any major changes in his general diet and to avoid alcohol consumption. .    Mr. Latasha Maier was provided a literature booklet, \"Treatment with Warfarin (Coumadin)\", that includes topics on understanding coumadin therapy, drug interaction considerations, vitamin K and coumadin use, interactions with foods and supplements containing vitamin K, and the use of herbal products. Mr. Latasha Maier verbalized his understanding of all instructions and will call the office with any questions, concerns, or signs of abnormal bleeding or blood clot.

## 2017-12-13 ENCOUNTER — TELEPHONE (OUTPATIENT)
Dept: INTERNAL MEDICINE CLINIC | Age: 62
End: 2017-12-13

## 2017-12-13 RX ORDER — BUTALBITAL, ACETAMINOPHEN AND CAFFEINE 300; 40; 50 MG/1; MG/1; MG/1
CAPSULE ORAL
Qty: 540 CAP | Refills: 3 | Status: SHIPPED | OUTPATIENT
Start: 2017-12-13 | End: 2018-06-04 | Stop reason: SDUPTHER

## 2017-12-19 DIAGNOSIS — M17.12 PRIMARY OSTEOARTHRITIS OF LEFT KNEE: Primary | ICD-10-CM

## 2017-12-22 RX ORDER — WARFARIN SODIUM 5 MG/1
TABLET ORAL
Qty: 75 TAB | Refills: 0 | Status: SHIPPED | OUTPATIENT
Start: 2017-12-22 | End: 2018-01-18 | Stop reason: SDUPTHER

## 2017-12-22 NOTE — TELEPHONE ENCOUNTER
Requested Prescriptions     Pending Prescriptions Disp Refills    warfarin (COUMADIN) 5 mg tablet 75 Tab 0

## 2018-01-02 ENCOUNTER — ANTI-COAG VISIT (OUTPATIENT)
Dept: INTERNAL MEDICINE CLINIC | Age: 63
End: 2018-01-02

## 2018-01-02 DIAGNOSIS — Z86.718 PERSONAL HISTORY OF VENOUS THROMBOSIS AND EMBOLISM: ICD-10-CM

## 2018-01-02 DIAGNOSIS — Z79.01 WARFARIN ANTICOAGULATION: ICD-10-CM

## 2018-01-02 LAB
INR BLD: 2.1
PT POC: NORMAL SECONDS
VALID INTERNAL CONTROL?: YES

## 2018-01-02 NOTE — PROGRESS NOTES
Mr. Floyde Goldberg is here today for anticoagulation monitoring for the diagnosis of Atrial Fibrillation. His INR goal is 2.0-3.0 and his current Coumadin dose is 12.5 mg 4 days a week and 10 mg 3 days a week. Today's findings include an INR of 2.1 (normal INR range 0.8-1.2)     Considering Mr. Winters's past history, todays findings, and per the coumadin policy/protocol, Mr. Tyron Liu was instructed to take Coumadin as follows,  Continue same dosing instruction. He was also instructed to schedule an appointment in 2 1/2 weeks prior to leaving for an INR check. A full discussion of the nature of anticoagulants has been carried out. A full discussion of the need for frequent and regular monitoring, precise dosage adjustment and compliance was stressed. Side effects of potential bleeding were discussed and Mr. Tyron Liu was instructed to call [phone] if there are any signs of abnormal bleeding. Mr. Tyron Liu was instructed to avoid any OTC items containing aspirin or ibuprofen and prior to starting any new OTC products to consult with his physician or pharmacist to ensure no drug interactions are present. Mr. Tyron Liu was instructed to avoid any major changes in his general diet and to avoid alcohol consumption. ..    Mr. Tyron Liu verbalized his understanding of all instructions and will call the office with any questions, concerns, or signs of abnormal bleeding or blood clot.

## 2018-01-11 ENCOUNTER — OFFICE VISIT (OUTPATIENT)
Dept: ORTHOPEDIC SURGERY | Facility: CLINIC | Age: 63
End: 2018-01-11

## 2018-01-11 VITALS
OXYGEN SATURATION: 100 % | RESPIRATION RATE: 18 BRPM | TEMPERATURE: 97 F | BODY MASS INDEX: 31.52 KG/M2 | WEIGHT: 220.2 LBS | DIASTOLIC BLOOD PRESSURE: 67 MMHG | SYSTOLIC BLOOD PRESSURE: 125 MMHG | HEART RATE: 68 BPM | HEIGHT: 70 IN

## 2018-01-11 DIAGNOSIS — M17.0 PRIMARY OSTEOARTHRITIS OF BOTH KNEES: Primary | ICD-10-CM

## 2018-01-11 DIAGNOSIS — M17.12 PRIMARY OSTEOARTHRITIS OF LEFT KNEE: ICD-10-CM

## 2018-01-11 DIAGNOSIS — Z86.718 H/O BLOOD CLOTS: ICD-10-CM

## 2018-01-11 NOTE — PROGRESS NOTES
Patient: Liz Caldwell                MRN: [id number]       SSN: [id number]  YOB: 1955        AGE: 57 y.o. SEX: male  Body mass index is 31.6 kg/(m^2). PCP: Karo Machado MD  01/11/18    HISTORY: Mr. Angela Austin is a very pleasant gentleman. He has end-staged arthritis of the left knee. He has had two back surgeries complicated each by DVTs without pulmonary embolism, one in the right leg and one in the left. He has had injections in the left knee. It no longer works. He is at his wits end. He has less than a five-minute level walking tolerance. He has pain at night and pain with activities of daily living. He is fed up and frustrated with it. He has really failed all nonoperative management. He also has a known rotator cuff tear on the left shoulder. Eventually, he will have surgery. The knee hurts him more than the shoulder does. He does suffer with chronic back pain but is living with it. All systems are reviewed and updated on the EMR. He is chronically on Coumadin. PHYSICAL EXAMINATION:  On examination today, he is a very pleasant gentleman. He stands in varus and has lateral thrust.  He has a couple degrees fixed flexion deformity. He bends well to about 120°. Both hips rotate nicely. There is 1+ pitting edema bilaterally. Both feet are we will plan. There is just slight evidence of neuropathy with sharp testing to L4-5. There is no foot drop. RADIOGRAPHS:  Review of his x-rays confirm severe end-staged arthritis of the left knee and moderate right. PLAN:  He is interested in having his left knee replacement. I am wondering if he is a candidate for a filter. I will send him to Dr. Issac Robbins.   Additionally, all risks and benefits were described including but not limited to bleeding, infection, DVT, pulmonary embolism, anesthetic complications, blood loss requiring transfusion, pain, stiffness, and other complications such as arthrofibrosis and also the importance of bending and straightening the knee at least 10 times a day to avoid permanent stiffness and pain. Also chronic pain, and we did discuss the role of his occupation as well. All questions were answered, and he would like to proceed. I think he will do very well. We will likely have to bridge his Coumadin with Lovenox and plus or minus a filter as per Dr. Neha Velez. It has been an absolute pleasure to share in his care. I will see him back in a couple of weeks after he has had a chance to see vascular. REVIEW OF SYSTEMS:      CON: negative for weight loss, fever  EYE: negative for double vision  ENT: negative for hoarseness  RS:   negative for Tb  GI:    negative for blood in stool  :  negative for blood in urine  Other systems reviewed and noted below.           Past Medical History:   Diagnosis Date    Arthritis     Bleeding     Blood clot in the legs     Esophageal reflux     Headache(784.0)     migraine    High cholesterol     Hypertension     Lower back pain 11/6/2010    Other chest pain     Personal history of venous thrombosis and embolism     Pure hypercholesterolemia     Right buttock pain 11/6/2010    Right foot pain     Spinal stenosis     Tendonitis, tibialis     anterior    Thromboembolus (H[facility])        Family History   Problem Relation Age of Onset   [facility] Arthritis-rheumatoid Mother     Dementia Mother     Heart Disease Father     Cancer Father     Hypertension Other        Current Outpatient Prescriptions   Medication Sig Dispense Refill    butalbital-acetaminophen-caff (FIORICET) -40 mg per capsule Take 2 capsules every 8 hours as needed for headache 540 Cap 3    warfarin (COUMADIN) 5 mg tablet TAKE 2 AND 1/2 TABLETS BY MOUTH DAILY OR AS DIRECTED 75 Tab 0    montelukast (SINGULAIR) 10 mg tablet TAKE 1 TABLET BY MOUTH EVERY DAY 90 Tab 0    lisinopril-hydroCHLOROthiazide (PRINZIDE, ZESTORETIC) 20-12.5 mg per tablet Take 1 Tab by mouth daily. 90 Tab 1    B.infantis-B.ani-B.long-B.bifi (PROBIOTIC 4X) 10-15 mg TbEC Take  by mouth.  red yeast rice extract 600 mg cap Take 600 mg by mouth now.  metaxalone (SKELAXIN) 800 mg tablet Take 1 Tab by mouth three (3) times daily. Indications: MUSCLE SPASM 30 Tab 2    labetalol (NORMODYNE) 100 mg tablet TAKE ONE TABLET BY MOUTH TWICE DAILY 180 Tab 0    lansoprazole (PREVACID) 30 mg capsule TAKE 1 CAPSULE DAILY BEFOREBREAKFAST 90 Cap 3    raNITIdine (ZANTAC) 150 mg tablet TK 1 T PO HS  5    acetaminophen (TYLENOL) 500 mg tablet Take  by mouth every six (6) hours as needed for Pain.  warfarin (COUMADIN) 5 mg tablet TAKE 2 AND 1/2 TABLETS BY MOUTH DAILY OR AS DIRECTED 75 Tab 0    warfarin (COUMADIN) 5 mg tablet TAKE 2 AND 1/2 TABLETS BY MOUTH DAILY OR AS DIRECTED. 75 Tab 0    HYDROcodone-acetaminophen (NORCO) 5-325 mg per tablet TK 1 T PO Q 6 H PRN FOR PAIN 12 Tab 0    methylPREDNISolone (MEDROL DOSEPACK) 4 mg tablet Per dose pack instructions 1 Dose Pack 0    montelukast (SINGULAIR) 10 mg tablet TAKE 1 TABLET BY MOUTH EVERY DAY 90 Tab 0    nystatin (MYCOSTATIN) topical cream Apply  to affected area two (2) times a day. 15 g 0    glucosamine-D3-Boswellia serr (OSTEO BI-FLEX, 5-LOXIN,) 1,500-400-100 mg-unit-mg tab Take  by mouth.       triamterene-hydroCHLOROthiazide (DYAZIDE) 37.5-25 mg per capsule 1 tablet each AM 30 Cap 3       Allergies   Allergen Reactions    Augmentin [Amoxicillin-Pot Clavulanate] Itching    Penicillins Rash       Past Surgical History:   Procedure Laterality Date    FOOT/TOES SURGERY PROC UNLISTED      HX BACK SURGERY      HX ORTHOPAEDIC  06-25-12    Right foot with excision of bursa and adipose tissue from fifth metatarsal base by Dr. Lillie Saunders      lower back (1992 and 2000)    HX OTHER SURGICAL      left foot (2008)    LAMINOTOMY      NERVE BLOCK         Social History     Social History    Marital status:  Spouse name: N/A    Number of children: N/A    Years of education: N/A     Occupational History    Not on file. Social History Main Topics    Smoking status: Never Smoker    Smokeless tobacco: Never Used    Alcohol use No    Drug use: No    Sexual activity: Not Currently     Other Topics Concern    Not on file     Social History Narrative       Visit Vitals    /67    Pulse 68    Temp 97 °F (36.1 °C) (Oral)    Resp 18    Ht 5' 10\" (1.778 m)    Wt 99.9 kg (220 lb 3.2 oz)    SpO2 100%    BMI 31.6 kg/m2         PHYSICAL EXAMINATION:  GENERAL: Alert and oriented x3, in no acute distress, well-developed, well-nourished, afebrile. HEART: No JVD. EYES: No scleral icterus   NECK: No significant lymphadenopathy   LUNGS: No respiratory compromise or indrawing  ABDOMEN: Soft, non-tender, non-distended. Electronically signed by:  Bret Mckeon MD

## 2018-01-18 ENCOUNTER — OFFICE VISIT (OUTPATIENT)
Dept: INTERNAL MEDICINE CLINIC | Age: 63
End: 2018-01-18

## 2018-01-18 ENCOUNTER — OFFICE VISIT (OUTPATIENT)
Dept: VASCULAR SURGERY | Age: 63
End: 2018-01-18

## 2018-01-18 VITALS
RESPIRATION RATE: 16 BRPM | OXYGEN SATURATION: 97 % | HEIGHT: 70 IN | BODY MASS INDEX: 31.92 KG/M2 | WEIGHT: 223 LBS | SYSTOLIC BLOOD PRESSURE: 120 MMHG | DIASTOLIC BLOOD PRESSURE: 70 MMHG | TEMPERATURE: 98.4 F | HEART RATE: 76 BPM

## 2018-01-18 DIAGNOSIS — R60.0 LEG EDEMA: Primary | ICD-10-CM

## 2018-01-18 DIAGNOSIS — I10 ESSENTIAL HYPERTENSION: ICD-10-CM

## 2018-01-18 DIAGNOSIS — Z79.01 WARFARIN ANTICOAGULATION: ICD-10-CM

## 2018-01-18 DIAGNOSIS — M48.062 SPINAL STENOSIS OF LUMBAR REGION WITH NEUROGENIC CLAUDICATION: ICD-10-CM

## 2018-01-18 DIAGNOSIS — Z86.718 PERSONAL HISTORY OF VENOUS THROMBOSIS AND EMBOLISM: ICD-10-CM

## 2018-01-18 DIAGNOSIS — R60.0 BILATERAL LEG EDEMA: Primary | ICD-10-CM

## 2018-01-18 DIAGNOSIS — M79.605 PAIN OF LEFT LOWER EXTREMITY: ICD-10-CM

## 2018-01-18 PROBLEM — M48.02 CERVICAL SPINAL STENOSIS: Status: RESOLVED | Noted: 2017-01-24 | Resolved: 2018-01-18

## 2018-01-18 LAB
INR BLD: 2.1
PT POC: NORMAL SECONDS
VALID INTERNAL CONTROL?: YES

## 2018-01-18 NOTE — PROGRESS NOTES
The patient presents to the office today with the chief complaint of left leg pain    HPI    The patient complains of several weeks of pain in his left shin. No history of trauma. The patient has spinal stenosis with occasional pain into his proximal leg. The patient remains on anticoagulation for chronic DVT in his legs. He has noticed some selling in his left leg. The patient remains on medications for hypertension. Review of Systems   Respiratory: Negative for shortness of breath. Cardiovascular: Negative for chest pain and leg swelling. Musculoskeletal: Positive for joint pain (stiffness in both knees). Allergies   Allergen Reactions    Augmentin [Amoxicillin-Pot Clavulanate] Itching    Penicillins Rash       Current Outpatient Prescriptions   Medication Sig Dispense Refill    butalbital-acetaminophen-caff (FIORICET) -40 mg per capsule Take 2 capsules every 8 hours as needed for headache 540 Cap 3    HYDROcodone-acetaminophen (NORCO) 5-325 mg per tablet TK 1 T PO Q 6 H PRN FOR PAIN 12 Tab 0    montelukast (SINGULAIR) 10 mg tablet TAKE 1 TABLET BY MOUTH EVERY DAY 90 Tab 0    nystatin (MYCOSTATIN) topical cream Apply  to affected area two (2) times a day. 15 g 0    lisinopril-hydroCHLOROthiazide (PRINZIDE, ZESTORETIC) 20-12.5 mg per tablet Take 1 Tab by mouth daily. 90 Tab 1    B.infantis-B.ani-B.long-B.bifi (PROBIOTIC 4X) 10-15 mg TbEC Take  by mouth.  red yeast rice extract 600 mg cap Take 600 mg by mouth now.  metaxalone (SKELAXIN) 800 mg tablet Take 1 Tab by mouth three (3) times daily. Indications: MUSCLE SPASM 30 Tab 2    labetalol (NORMODYNE) 100 mg tablet TAKE ONE TABLET BY MOUTH TWICE DAILY 180 Tab 0    raNITIdine (ZANTAC) 150 mg tablet TK 1 T PO HS  5    acetaminophen (TYLENOL) 500 mg tablet Take  by mouth every six (6) hours as needed for Pain.       warfarin (COUMADIN) 5 mg tablet TAKE 2 AND 1/2 TABLETS BY MOUTH DAILY OR AS DIRECTED 75 Tab 0    glucosamine-D3-Boswellia serr (OSTEO BI-FLEX, 5-LOXIN,) 1,500-400-100 mg-unit-mg tab Take  by mouth.  lansoprazole (PREVACID) 30 mg capsule TAKE 1 CAPSULE DAILY BEFOREBREAKFAST 90 Cap 3       Past Medical History:   Diagnosis Date    Arthritis     Bleeding     Blood clot in the legs     Esophageal reflux     Headache(784.0)     migraine    High cholesterol     Hypertension     Lower back pain 11/6/2010    Other chest pain     Personal history of venous thrombosis and embolism     Pure hypercholesterolemia     Right buttock pain 11/6/2010    Right foot pain     Spinal stenosis     Tendonitis, tibialis     anterior    Thromboembolus (HCC)        Past Surgical History:   Procedure Laterality Date    FOOT/TOES SURGERY PROC UNLISTED      HX BACK SURGERY      HX ORTHOPAEDIC  06-25-12    Right foot with excision of bursa and adipose tissue from fifth metatarsal base by Dr. Hermes Pérez      lower back (1992 and 2000)    HX OTHER SURGICAL      left foot (2008)    LAMINOTOMY      NERVE BLOCK         Social History     Social History    Marital status:      Spouse name: N/A    Number of children: N/A    Years of education: N/A     Occupational History    Not on file. Social History Main Topics    Smoking status: Never Smoker    Smokeless tobacco: Never Used    Alcohol use No    Drug use: No    Sexual activity: Not Currently     Other Topics Concern    Not on file     Social History Narrative       Patient does not have an advanced directive on file    Visit Vitals    /70 (BP 1 Location: Left arm, BP Patient Position: Sitting)    Pulse 76    Temp 98.4 °F (36.9 °C) (Tympanic)    Resp 16    Ht 5' 10\" (1.778 m)    Wt 223 lb (101.2 kg)    SpO2 97%    BMI 32 kg/m2       Physical Exam   No Cervical Lymphadenopathy  No Supraclavicular Lymphadenopathy  Thyroid is Normal  Lungs are normal to percussion.   Clear to auscultation   Heart:  S1 S2 are normal, No gallops, No mummers  No Carotid Bruits  Abdomen:  Normal Bowel Sounds. No tenderness. No masses. No Hepatomegaly or Splenomegly  Osteoarthritis in both knees  LE:  Strong Pedal Pulses. trace edema left ankle  Left tibia with negative exam    BMI:  The patient was advised to limit calories to 2000 macey and carbohydrates to 100 grams daily      Anti-Coag visit on 01/02/2018   Component Date Value Ref Range Status    VALID INTERNAL CONTROL POC 01/02/2018 Yes   Final    INR POC 01/02/2018 2.1   Final   Anti-Coag visit on 12/11/2017   Component Date Value Ref Range Status    VALID INTERNAL CONTROL POC 12/11/2017 Yes   Final    INR POC 12/11/2017 2.7   Final   Anti-Coag visit on 11/15/2017   Component Date Value Ref Range Status    VALID INTERNAL CONTROL POC 11/15/2017 Yes   Final    INR POC 11/15/2017 2.6   Final       .No results found for any visits on 01/18/18. Assessment / Plan      ICD-10-CM ICD-9-CM    1. Leg edema R60.0 782.3 DUPLEX LOWER EXT VENOUS BILAT   2. Warfarin anticoagulation Z79.01 V58.61 DUPLEX LOWER EXT VENOUS BILAT   3. Essential hypertension I10 401.9 DUPLEX LOWER EXT VENOUS BILAT   4. Personal history of venous thrombosis and embolism Z86.718 V12.51 DUPLEX LOWER EXT VENOUS BILAT   5. Spinal stenosis of lumbar region with neurogenic claudication M48.062 724.03 MRI LUMB SPINE WO CONT      MRI LUMB SPINE W WO CONT   6. Pain of left lower extremity M79.605 729.5 XR TIB/FIB LT       Duplex of left leg - rule out DVT  MRI of lumbar spine  Protime today  he was advised to continue his maintenance medications      Follow-up Disposition:  Return in about 2 weeks (around 2/1/2018). I asked Jose Vora if he has any questions and I answered the questions.   Jose Vora states that he understands the treatment plan and agrees with the treatment plan

## 2018-01-18 NOTE — PATIENT INSTRUCTIONS
Health Maintenance Due   Topic Date Due    Hepatitis C Test  1955    Shingles Vaccine  02/13/2013    Flu Vaccine  08/01/2017 Pt states that he was having weird dreams last night. Saw his parents in the corner of his room and they are both dead. Also saw his sister who is dead. Pt knew it was April and 2017 but thought it was Sunday.

## 2018-01-18 NOTE — PROCEDURES
[facility]   *** FINAL REPORT ***    Name: Anne Culver  MRN: [id number]       Outpatient  : 1955  HIS Order #: [id number]  [address] [facility] Visit #: [id number]  Date: 2018    TYPE OF TEST: Peripheral Venous Testing    REASON FOR TEST  Edema    Right Leg:-  Deep venous thrombosis:           No  Superficial venous thrombosis:    Not examined  Deep venous insufficiency:        Not examined  Superficial venous insufficiency: Not examined    Left Leg:-  Deep venous thrombosis:           No  Superficial venous thrombosis:    Not examined  Deep venous insufficiency:        Not examined  Superficial venous insufficiency: Not examined      INTERPRETATION/FINDINGS  Duplex images were obtained using 2-D gray scale, color flow and  spectral doppler analysis. 1. No evidence of deep vein thrombosis in the common femoral, proximal   deep femoral, femoral, popliteal, posterior tibial and peroneal veins   bilaterally. 2. Triphasic posterior tibial artery flow bilaterally. ADDITIONAL COMMENTS    I have personally reviewed the data relevant to the interpretation of  this  study. TECHNOLOGIST: Emily Novoa RVT, PHANI  Signed: 2018 01:41 PM    PHYSICIAN: Zahra Milner D.O.   Signed: 2018 01:15 PM

## 2018-01-18 NOTE — MR AVS SNAPSHOT
[address] [place] [address] 
 
 
 [address] [facility], [address] [facility] [address] [phone] Patient: Lorena Kay MRN: [id number] TVR:0/18/3426 Visit Information Date & Time Provider Department Dept. Phone Encounter #  
 1/18/2018  9:15 AM Adán Sutton MD [facility] INTERNAL MEDICINE [facility] [phone] [id number] Follow-up Instructions Return in about 2 weeks (around 2/1/2018). Your Appointments 1/18/2018 12:45 PM  
PROCEDURE with [facility] 2 [facility] ([facility]) Appt Note: nima le acv alex tristan [phone] [facility]0 [facility] [address] 6 [facility]  
[phone] [address] [place] [address]  
  
    
 1/23/2018 11:00 AM  
Office Visit with [facility] ([facility]) Appt Note: hx of nima le dvt's, needs to be cleared for sx w/[place] for l total knee on 02/12/18 - claudia [phone] says all notes are in [address]  
[phone] [address] [place] [address]  
  
    
 2/1/2018  8:30 AM  
PRE OP with Adán Sutton MD  
[facility] INTERNAL MEDICINE OF [facility]) Appt Note: PRE-OP CLEARANCE,  
 [address] [facility], [address] [facility] [id number] [phone]  
  
   
 [address] [facility], [address] [facility] [address]  
  
    
 2/1/2018 10:15 AM  
Follow Up with Dania Boas, MD  
[facility] [place] [address] [place] [address]) Appt Note: FU FROM VASULAR APPT. [address] [facility], [address] [facility]  
[phone]  
  
   
 [address] [place] [address], [address] [address]  
  
    
 2/7/2018  8:30 AM  
HISTORY AND PHYSICAL with Taurus Connolly PA-C  
[facility] [place] [address] [place] [address] [facility]) Appt Note: LEFT TOTAL KNEE ARTHROPLASTY/NEEDS FILMS [address] [facility], [address] [facility]  
[phone]  
  
   
 [address] [facility], [address] [address]  
  
    
 3/6/2018  3:30 PM  
Follow Up with Emilia Arreguin MD  
[address] [facility], [address] [facility] ONE [address] [facility]) Appt Note: 3 mo f/u  
 [address] [facility] [address]  
[phone]  
  
   
 [address] [facility] [address] Upcoming Health Maintenance Date Due Hepatitis C Screening 1955 ZOSTER VACCINE AGE 60> 2/13/2013 Influenza Age 5 to Adult 8/1/2017 DTaP/Tdap/Td series (2 - Td) 12/6/2024 COLONOSCOPY 5/27/2026 Allergies as of 1/18/2018  Review Complete On: 1/18/2018 By: Angie Kruger LPN Severity Noted Reaction Type Reactions Augmentin [Amoxicillin-pot Clavulanate]    Itching Penicillins    Rash Current Immunizations  Reviewed on 1/17/2018 Name Date Tdap 12/6/2014 Not reviewed this visit You Were Diagnosed With   
  
 Codes Comments Leg edema    -  Primary ICD-10-CM: R60.0 ICD-9-CM: 885. 3 Warfarin anticoagulation     ICD-10-CM: Z79.01 
ICD-9-CM: V58.61 Essential hypertension     ICD-10-CM: I10 
ICD-9-CM: 401.9 Personal history of venous thrombosis and embolism     ICD-10-CM: Z86.718 ICD-9-CM: V12.51 Spinal stenosis of lumbar region with neurogenic claudication     ICD-10-CM: M48.062 
ICD-9-CM: 724.03 Vitals BP Pulse Temp Resp Height(growth percentile) 120/70 (BP 1 Location: Left arm, BP Patient Position: Sitting) 76 98.4 °F (36.9 °C) (Tympanic) 16 5' 10\" (1.778 m) Weight(growth percentile) SpO2 BMI Smoking Status 223 lb (101.2 kg) 97% 32 kg/m2 Never Smoker BMI and BSA Data Body Mass Index Body Surface Area 32 kg/m 2 2.24 m 2 Preferred Pharmacy Pharmacy Name Phone [facility] [address] [place], [place] [address]ie [address] [facility] [phone] Your Updated Medication List  
  
   
This list is accurate as of: 1/18/18 11:21 AM.  Always use your most recent med list.  
  
  
  
  
 acetaminophen 500 mg tablet Commonly known as:  TYLENOL Take  by mouth every six (6) hours as needed for Pain. butalbital-acetaminophen-caff -40 mg per capsule Commonly known as:  [employer] Take 2 capsules every 8 hours as needed for headache HYDROcodone-acetaminophen 5-325 mg per tablet Commonly known as:  Amanda Punt TK 1 T PO Q 6 H PRN FOR PAIN  
  
 labetalol 100 mg tablet Commonly known as:  NORMODYNE  
TAKE ONE TABLET BY MOUTH TWICE DAILY  
  
 lansoprazole 30 mg capsule Commonly known as:  PREVACID TAKE 1 CAPSULE DAILY BEFOREBREAKFAST  
  
 lisinopril-hydroCHLOROthiazide 20-12.5 mg per tablet Commonly known as:  Eure Common Take 1 Tab by mouth daily. metaxalone 800 mg tablet Commonly known as:  SKELAXIN Take 1 Tab by mouth three (3) times daily. Indications: MUSCLE SPASM  
  
 montelukast 10 mg tablet Commonly known as:  SINGULAIR  
TAKE 1 TABLET BY MOUTH EVERY DAY  
  
 nystatin topical cream  
Commonly known as:  MYCOSTATIN Apply  to affected area two (2) times a day. OSTEO BI-FLEX (5-LOXIN) 1,500-400-100 mg-unit-mg Tab Generic drug:  glucosamine-D3-Boswellia serr Take  by mouth. PROBIOTIC 4X 10-15 mg Tbec Generic drug:  B.infantis-B.ani-B.long-B.bifi Take  by mouth. raNITIdine 150 mg tablet Commonly known as:  ZANTAC TK 1 T PO HS  
  
 red yeast rice extract 600 mg Cap Take 600 mg by mouth now.  
  
 warfarin 5 mg tablet Commonly known as:  COUMADIN  
TAKE 2 AND 1/2 TABLETS BY MOUTH DAILY OR AS DIRECTED Follow-up Instructions Return in about 2 weeks (around 2/1/2018). To-Do List   
 01/19/2018 Imaging:  DUPLEX LOWER EXT VENOUS BILAT   
  
 01/19/2018   Imaging:  MRI LUMB SPINE WO CONT   
  
 01/23/2018 8:00 PM  
 Appointment with [facility] MRI RM 2 at [address] [place] [address] ([phone]) GENERAL INSTRUCTIONS  Bring information (ID card) if you have any medically implanted devices. You will be required to lie still while the procedure is being performed. Remove any jewelry (including body piercing, hairpins) prior to MRI. If you have had a creatinine level drawn within the past 30 days, please bring most recent results to your appt. Bring any films, CD's, and reports related to your study with you on the day of your exam.  This only includes studies done outside of [address] [place] [address], [facility], [facility], and [facility]. Bring a complete list of all medications you are currently taking to include prescriptions, over-the-counter meds, herbals, vitamins & any dietary supplements. If you were given medications for claustrophobia or anxiety, please arrange to have someone drive you to your appointment. QUESTIONS  Notify the MRI Department if you have any questions concerning your study. [facility] [phone] [address] [phone] Referral Information Referral ID Referred By Referred To  
  
 [id number] Sabina ORNELAS Not Available Visits Status Start Date End Date 1 New Request 1/18/18 1/18/19 If your referral has a status of pending review or denied, additional information will be sent to support the outcome of this decision. Patient Instructions Health Maintenance Due Topic Date Due  
 Hepatitis C Test  1955  Shingles Vaccine  02/13/2013  Flu Vaccine  08/01/2017 Introducing \[facility]\"" & HEALTH SERVICES! David Ryan introduces [employer] patient portal. Now you can access parts of your medical record, email your doctor's office, and request medication refills online. 1. In your internet browser, go to [email or web address] 2. Click on the First Time User? Click Here link in the Sign In box.  You will see the New Member Sign Up page. 3. Enter your [employer] Access Code exactly as it appears below. You will not need to use this code after youve completed the sign-up process. If you do not sign up before the expiration date, you must request a new code. · [employer] Access Code: [email or web address] Expires: 2/13/2018 12:24 PM 
 
4. Enter the last four digits of your Social Security Number (xxxx) and Date of Birth (mm/dd/yyyy) as indicated and click Submit. You will be taken to the next sign-up page. 5. Create a [employer] ID. This will be your [employer] login ID and cannot be changed, so think of one that is secure and easy to remember. 6. Create a [employer] password. You can change your password at any time. 7. Enter your Password Reset Question and Answer. This can be used at a later time if you forget your password. 8. Enter your e-mail address. You will receive e-mail notification when new information is available in [address] 9. Click Sign Up. You can now view and download portions of your medical record. 10. Click the Download Summary menu link to download a portable copy of your medical information. If you have questions, please visit the Frequently Asked Questions section of the [employer] website. Remember, [employer] is NOT to be used for urgent needs. For medical emergencies, dial 911. Now available from your iPhone and Android! Please provide this summary of care documentation to your next provider. Your primary care clinician is listed as [place]. If you have any questions after today's visit, please call [phone].

## 2018-01-18 NOTE — PROGRESS NOTES
1. Have you been to the ER, urgent care clinic since your last visit? Hospitalized since your last visit? No    2. Have you seen or consulted any other health care providers outside of the [address] [place] [address] since your last visit? Include any pap smears or colon screening.  No

## 2018-01-19 ENCOUNTER — HOSPITAL ENCOUNTER (OUTPATIENT)
Dept: GENERAL RADIOLOGY | Age: 63
Discharge: HOME | End: 2018-01-19
Payer: COMMERCIAL

## 2018-01-19 ENCOUNTER — TELEPHONE (OUTPATIENT)
Dept: ORTHOPEDIC SURGERY | Age: 63
End: 2018-01-19

## 2018-01-19 DIAGNOSIS — M79.605 PAIN OF LEFT LOWER EXTREMITY: ICD-10-CM

## 2018-01-19 PROCEDURE — 73590 X-RAY EXAM OF LOWER LEG: CPT

## 2018-01-19 NOTE — TELEPHONE ENCOUNTER
Patient's wife Bonnie Bales called stating the MRI is supposed to be of his back, which is why it needs to be done with Dr. Miladys Harrington. She has multiple questions that need to be answered by either Dave Andrew or a nurse. Please advise patient or Bonnie Bales at [phone].

## 2018-01-19 NOTE — TELEPHONE ENCOUNTER
Patient recently was sen by his PCP Lillian Morales and he suggest that the patient have an MRI before his TOTAL KNEE REPLACEMENT surgery with Dr. Jose Luis King. Patient stated that the MRI would be under Worker's Comp which is why Dr. Giovana Harris suggest pt gets the Mri Order from Dr. Magaly Hermosillo. Please advise patient at [phone].

## 2018-01-19 NOTE — TELEPHONE ENCOUNTER
He will need to be re-evaluated if he wants an MRI of his back, Dr. Yokasta Agarwal did not mention getting one, but I see that his PCP ordered one yesterday, so I'm not sure what the issue is here?

## 2018-01-19 NOTE — TELEPHONE ENCOUNTER
Called and spoke with wife Sara Chowdhury on HIPAA, per Sara Chowdhury she wants to make sure the MRI of lumbar was to be scheduled and covered through his W/C. Per wife, she wants to make sure it is covered through patient's W/C and that Dr. Jose De Jesus Valentine office states it needs to be scheduled by Dr. Ruddy Amaya. Informed wife, Dr. Ruddy Amaya is out of the office today, however she will return on Monday, and I can discuss this with her at that time. Wife verbalized agreement/understanding.

## 2018-01-22 ENCOUNTER — TELEPHONE (OUTPATIENT)
Dept: INTERNAL MEDICINE CLINIC | Age: 63
End: 2018-01-22

## 2018-01-22 DIAGNOSIS — Z01.818 PREOP EXAMINATION: Primary | ICD-10-CM

## 2018-01-22 NOTE — TELEPHONE ENCOUNTER
Spoke with Christiano Amaya on (HIPAA), informed of below, per Christiano Amaya, she understands, and patient will be in the [facility] office at 03-45-19-15 to see Dr. Esequiel Martinez on tomorrow 01/23/18. Juli at  informed, to schedule appointment. No further action required at this time.

## 2018-01-23 ENCOUNTER — OFFICE VISIT (OUTPATIENT)
Dept: ORTHOPEDIC SURGERY | Age: 63
End: 2018-01-23

## 2018-01-23 ENCOUNTER — OFFICE VISIT (OUTPATIENT)
Dept: VASCULAR SURGERY | Age: 63
End: 2018-01-23

## 2018-01-23 VITALS
HEIGHT: 70 IN | WEIGHT: 220 LBS | BODY MASS INDEX: 31.5 KG/M2 | SYSTOLIC BLOOD PRESSURE: 126 MMHG | DIASTOLIC BLOOD PRESSURE: 80 MMHG | HEART RATE: 76 BPM

## 2018-01-23 VITALS
TEMPERATURE: 97.6 F | HEIGHT: 70 IN | DIASTOLIC BLOOD PRESSURE: 71 MMHG | WEIGHT: 220.8 LBS | SYSTOLIC BLOOD PRESSURE: 136 MMHG | OXYGEN SATURATION: 98 % | BODY MASS INDEX: 31.61 KG/M2

## 2018-01-23 DIAGNOSIS — R29.898 LEFT LEG WEAKNESS: ICD-10-CM

## 2018-01-23 DIAGNOSIS — M79.2 NEURITIS: ICD-10-CM

## 2018-01-23 DIAGNOSIS — Z86.718 HISTORY OF DVT (DEEP VEIN THROMBOSIS): Primary | ICD-10-CM

## 2018-01-23 DIAGNOSIS — M47.816 LUMBAR SPONDYLOSIS: ICD-10-CM

## 2018-01-23 DIAGNOSIS — M51.36 DEGENERATION OF LUMBAR INTERVERTEBRAL DISC: Primary | ICD-10-CM

## 2018-01-23 DIAGNOSIS — Z79.01 WARFARIN ANTICOAGULATION: ICD-10-CM

## 2018-01-23 DIAGNOSIS — M17.12 ARTHRITIS OF KNEE, LEFT: ICD-10-CM

## 2018-01-23 RX ORDER — METHYLPREDNISOLONE 4 MG/1
TABLET ORAL
Refills: 0 | COMMUNITY
Start: 2017-12-04 | End: 2018-02-01 | Stop reason: ALTCHOICE

## 2018-01-23 RX ORDER — LORATADINE 10 MG/1
10 TABLET ORAL
COMMUNITY
End: 2018-02-01 | Stop reason: ALTCHOICE

## 2018-01-23 NOTE — PATIENT INSTRUCTIONS

## 2018-01-23 NOTE — MR AVS SNAPSHOT
[address] [address] 510 [facility] 
[phone] Patient: Jocy Phillip MRN: [id number] ONB:[birth year]47[birth year]80 Visit Information Date & Time Provider Department Dept. Phone Encounter #  
 1/23/2018 11:00 AM [facility] [phone] [id number] Follow-up Instructions Return if symptoms worsen or fail to improve. Your Appointments 2/1/2018  8:30 AM  
PRE OP with Celeste Perry MD  
[address] ([facility]) Appt Note: PRE-OP CLEARANCE,  
 [address] [place] [address], [address] [facility] [address]  
[phone]  
  
   
 [address], [address] [facility] [address]  
  
    
 2/1/2018 10:15 AM  
Follow Up with Jaylin Leos MD  
[facility] - [address]) Appt Note: FU FROM VASULAR APPT. [address], [address] [facility] [address] [phone]  
  
   
 [address], [address] [address]  
  
    
 2/6/2018  7:45 AM  
DIAG TEST F/U with Rasheed Santos MD  
[address] [place] [address], [address] [facility] [address]) Appt Note: mri f/u bring disk [address] [facility] [address] [phone]  
  
   
 [address]  
  
    
 2/7/2018  8:30 AM  
HISTORY AND PHYSICAL with Fauzia Tate PA-C  
[facility] - [address]) Appt Note: LEFT TOTAL KNEE ARTHROPLASTY/NEEDS FILMS  
 [address] [place] [address], [address]  
[phone]  
  
   
 [address], [address] [address]  
  
    
 3/6/2018  3:30 PM  
Follow Up with Rasheed Santos MD  
[address] [place] [address], [address] [facility] [address]) Appt Note: 3 mo f/u  
 [address] [facility] [address]  
[phone]  
  
   
 [address] [facility] [address] Upcoming Health Maintenance Date Due Hepatitis C Screening 1955 ZOSTER VACCINE AGE 60> 2/13/2013 Influenza Age 5 to Adult 8/1/2017 DTaP/Tdap/Td series (2 - Td) 12/6/2024 COLONOSCOPY 5/27/2026 Allergies as of 1/23/2018  Review Complete On: 1/23/2018 By: Harpreet Dang LPN Severity Noted Reaction Type Reactions Augmentin [Amoxicillin-pot Clavulanate]    Itching Penicillins    Rash Current Immunizations  Reviewed on 1/17/2018 Name Date Tdap 12/6/2014 Not reviewed this visit Vitals BP Pulse Height(growth percentile) Weight(growth percentile) BMI Smoking Status 126/80 (BP 1 Location: Right arm, BP Patient Position: Sitting) 76 5' 10\" (1.778 m) 220 lb (99.8 kg) 31.57 kg/m2 Never Smoker BMI and BSA Data Body Mass Index Body Surface Area  
 31.57 kg/m 2 2.22 m 2 Preferred Pharmacy Pharmacy Name Phone [facility] [address] [place] [address] Your Updated Medication List  
  
   
This list is accurate as of: 1/23/18 11:19 AM.  Always use your most recent med list.  
  
  
  
  
 acetaminophen 500 mg tablet Commonly known as:  TYLENOL Take  by mouth every six (6) hours as needed for Pain. butalbital-acetaminophen-caff -40 mg per capsule Commonly known as:  [employer] Take 2 capsules every 8 hours as needed for headache CLARITIN 10 mg tablet Generic drug:  loratadine Take 10 mg by mouth. HYDROcodone-acetaminophen 5-325 mg per tablet Commonly known as:  Radonna Roseland TK 1 T PO Q 6 H PRN FOR PAIN  
  
 labetalol 100 mg tablet Commonly known as:  NORMODYNE  
TAKE ONE TABLET BY MOUTH TWICE DAILY  
  
 lansoprazole 30 mg capsule Commonly known as:  PREVACID TAKE 1 CAPSULE DAILY BEFOREBREAKFAST  
  
 lisinopril-hydroCHLOROthiazide 20-12.5 mg per tablet Commonly known as:  All Priya  
 Take 1 Tab by mouth daily. metaxalone 800 mg tablet Commonly known as:  SKELAXIN Take 1 Tab by mouth three (3) times daily. Indications: MUSCLE SPASM  
  
 methylPREDNISolone 4 mg tablet Commonly known as:  Maria Elena Mitten TK UTD  
  
 montelukast 10 mg tablet Commonly known as:  SINGULAIR  
TAKE 1 TABLET BY MOUTH EVERY DAY  
  
 nystatin topical cream  
Commonly known as:  MYCOSTATIN Apply  to affected area two (2) times a day. OSTEO BI-FLEX (5-LOXIN) 1,500-400-100 mg-unit-mg Tab Generic drug:  glucosamine-D3-Boswellia serr Take  by mouth. PROBIOTIC 4X 10-15 mg Tbec Generic drug:  B.infantis-B.ani-B.long-B.bifi Take  by mouth. raNITIdine 150 mg tablet Commonly known as:  ZANTAC TK 1 T PO HS  
  
 red yeast rice extract 600 mg Cap Take 600 mg by mouth now.  
  
 warfarin 5 mg tablet Commonly known as:  COUMADIN  
TAKE 2 AND 1/2 TABLETS BY MOUTH DAILY OR AS DIRECTED Follow-up Instructions Return if symptoms worsen or fail to improve. To-Do List   
 01/24/2018 7:00 PM  
  Appointment with [facility] RM 2 at [address] [place] [address] ([phone]) GENERAL INSTRUCTIONS  Bring information (ID card) if you have any medically implanted devices. You will be required to lie still while the procedure is being performed. Remove any jewelry (including body piercing, hairpins) prior to MRI. If you have had a creatinine level drawn within the past 30 days, please bring most recent results to your appt. Bring any films, CD's, and reports related to your study with you on the day of your exam.  This only includes studies done outside of [address] [place] [address], [address] [address], [place], and [facility]. Bring a complete list of all medications you are currently taking to include prescriptions, over-the-counter meds, herbals, vitamins & any dietary supplements.   If you were given medications for claustrophobia or anxiety, please arrange to have someone drive you to your appointment. QUESTIONS  Notify the MRI Department if you have any questions concerning your study. Marika Branch 94 - [phone] [address] Wilfred Jackmani - [phone] Introducing [facility] & HEALTH SERVICES! [facility] introduces SeerGate patient portal. Now you can access parts of your medical record, email your doctor's office, and request medication refills online. 1. In your internet browser, go to [email or web address] 2. Click on the First Time User? Click Here link in the Sign In box. You will see the New Member Sign Up page. 3. Enter your SeerGate Access Code exactly as it appears below. You will not need to use this code after youve completed the sign-up process. If you do not sign up before the expiration date, you must request a new code. · SeerGate Access Code: [email or web address] Expires: 2/13/2018 12:24 PM 
 
4. Enter the last four digits of your Social Security Number (xxxx) and Date of Birth (mm/dd/yyyy) as indicated and click Submit. You will be taken to the next sign-up page. 5. Create a SeerGate ID. This will be your SeerGate login ID and cannot be changed, so think of one that is secure and easy to remember. 6. Create a SeerGate password. You can change your password at any time. 7. Enter your Password Reset Question and Answer. This can be used at a later time if you forget your password. 8. Enter your e-mail address. You will receive e-mail notification when new information is available in [address] 9. Click Sign Up. You can now view and download portions of your medical record. 10. Click the Download Summary menu link to download a portable copy of your medical information. If you have questions, please visit the Frequently Asked Questions section of the SeerGate website. Remember, SeerGate is NOT to be used for urgent needs. For medical emergencies, dial 911. Now available from your iPhone and Android! Please provide this summary of care documentation to your next provider. Your [facility] care [facility]. If you have any questions after today's visit, please call [phone].

## 2018-01-23 NOTE — MR AVS SNAPSHOT
[address] [place] [address]. [address] [facility] [address] 
[phone] Patient: Lakshmi Ravi MRN: [id number] WDD:[birth year] Visit Information Date & Time Provider Department Dept. Phone Encounter #  
 1/23/2018  7:50 AM Ronaldo Harris MD [facility] [phone] [id number] Follow-up Instructions Return in about 2 weeks (around 2/6/2018) for Diagnostic Test follow up. Your Appointments 1/23/2018 11:00 AM  
Office Visit with CARA Vasquez [facility] ([address] [place] [address]) Appt Note: hx of nima le dvt's, needs to be cleared for sx w/Jacob for l total knee on 02/12/18 - claudia [phone] says all notes are in [address]  
[phone] [address] [place] [address]  
  
    
 2/1/2018  8:30 AM  
PRE OP with Milo Lopez MD  
[facility] [place] [address] [facility]) Appt Note: PRE-OP CLEARANCE,  
 [address] [place] [address], [address] [facility] [address] [phone]  
  
   
 [address] [facility], [address] [facility] [address]  
  
    
 2/1/2018 10:15 AM  
Follow Up with Emiliana Villafuerte MD  
[facility] - [address]) Appt Note: FU FROM VASULAR APPT. [address] [facility], [address]  
[phone]  
  
   
 [address] [facility], [address] [address]  
  
    
 2/7/2018  8:30 AM  
HISTORY AND PHYSICAL with Socorro Garvey PA-C  
[facility] - [address]) Appt Note: LEFT TOTAL KNEE ARTHROPLASTY/NEEDS FILMS  
 [address] [place] [address], [address]  
[phone]  
  
   
 [address] [facility], [address] [address]  
  
    
 3/6/2018  3:30 PM  
Follow Up with Ronaldo Harris MD  
[address] [place] [address], [address] [facility] [address]) Appt Note: 3 mo f/u  
 [address] [facility] [address]  
[phone]  
  
   
 [address] [facility] [address] Upcoming Health Maintenance Date Due Hepatitis C Screening 1955 ZOSTER VACCINE AGE 60> 2/13/2013 Influenza Age 5 to Adult 8/1/2017 DTaP/Tdap/Td series (2 - Td) 12/6/2024 COLONOSCOPY 5/27/2026 Allergies as of 1/23/2018  Review Complete On: 1/23/2018 By: Josee Castaneda LPN Severity Noted Reaction Type Reactions Augmentin [Amoxicillin-pot Clavulanate]    Itching Penicillins    Rash Current Immunizations  Reviewed on 1/17/2018 Name Date Tdap 12/6/2014 Not reviewed this visit You Were Diagnosed With   
  
 Codes Comments Degeneration of lumbar intervertebral disc    -  Primary ICD-10-CM: M51.36 
ICD-9-CM: 722.52 Lumbar spondylosis     ICD-10-CM: M47.816 ICD-9-CM: 721.3 Left leg weakness     ICD-10-CM: R29.898 ICD-9-CM: 729.89 Neuritis     ICD-10-CM: M79.2 ICD-9-CM: 729.2 Vitals BP Temp Height(growth percentile) Weight(growth percentile) SpO2 BMI  
 136/71 97.6 °F (36.4 °C) (Oral) 5' 10\" (1.778 m) 220 lb 12.8 oz (100.2 kg) 98% 31.68 kg/m2 Smoking Status Never Smoker BMI and BSA Data Body Mass Index Body Surface Area  
 31.68 kg/m 2 2.22 m 2 Preferred Pharmacy Pharmacy Name Phone [facility] [address] [place] [address] [address] [phone] Your Updated Medication List  
  
   
This list is accurate as of: 1/23/18  9:23 AM.  Always use your most recent med list.  
  
  
  
  
 acetaminophen 500 mg tablet Commonly known as:  TYLENOL Take  by mouth every six (6) hours as needed for Pain. butalbital-acetaminophen-caff -40 mg per capsule Commonly known as:  [employer] Take 2 capsules every 8 hours as needed for headache HYDROcodone-acetaminophen 5-325 mg per tablet Commonly known as:  Josie Rummage TK 1 T PO Q 6 H PRN FOR PAIN  
  
 labetalol 100 mg tablet Commonly known as:  NORMODYNE  
TAKE ONE TABLET BY MOUTH TWICE DAILY  
  
 lansoprazole 30 mg capsule Commonly known as:  PREVACID TAKE 1 CAPSULE DAILY BEFOREBREAKFAST  
  
 lisinopril-hydroCHLOROthiazide 20-12.5 mg per tablet Commonly known as:  Pegge Dean Take 1 Tab by mouth daily. metaxalone 800 mg tablet Commonly known as:  SKELAXIN Take 1 Tab by mouth three (3) times daily. Indications: MUSCLE SPASM  
  
 methylPREDNISolone 4 mg tablet Commonly known as:  Celeste Canny TK UTD  
  
 montelukast 10 mg tablet Commonly known as:  SINGULAIR  
TAKE 1 TABLET BY MOUTH EVERY DAY  
  
 nystatin topical cream  
Commonly known as:  MYCOSTATIN Apply  to affected area two (2) times a day. OSTEO BI-FLEX (5-LOXIN) 1,500-400-100 mg-unit-mg Tab Generic drug:  glucosamine-D3-Boswellia serr Take  by mouth. PROBIOTIC 4X 10-15 mg Tbec Generic drug:  B.infantis-B.ani-B.long-B.bifi Take  by mouth. raNITIdine 150 mg tablet Commonly known as:  ZANTAC TK 1 T PO HS  
  
 red yeast rice extract 600 mg Cap Take 600 mg by mouth now.  
  
 warfarin 5 mg tablet Commonly known as:  COUMADIN  
TAKE 2 AND 1/2 TABLETS BY MOUTH DAILY OR AS DIRECTED Follow-up Instructions Return in about 2 weeks (around 2/6/2018) for Diagnostic Test follow up. To-Do List   
 01/23/2018 Imaging:  MRI LUMB SPINE W WO CONT   
  
 01/24/2018 6:30 PM  
  Appointment with [facility] MRI  1 at [address] [place] [address] ([phone]) GENERAL INSTRUCTIONS  Bring information (ID card) if you have any medically implanted devices. You will be required to lie still while the procedure is being performed. Remove any jewelry (including body piercing, hairpins) prior to MRI.   If you have had a creatinine level drawn within the past 30 days, please bring most recent results to your appt. Bring any films, CD's, and reports related to your study with you on the day of your exam.  This only includes studies done outside of [address] [place] [address], \[facility]\"", [address] [place] [address], and [facility]. Bring a complete list of all medications you are currently taking to include prescriptions, over-the-counter meds, herbals, vitamins & any dietary supplements. If you were given medications for claustrophobia or anxiety, please arrange to have someone drive you to your appointment. QUESTIONS  Notify the MRI Department if you have any questions concerning your study. [facility] - [phone] [facility] [address] [place] [address] - [phone] Referral Information Referral ID Referred By Referred To  
  
 [id number] Montserrat Coley Not Available Visits Status Start Date End Date 1 New Request 1/23/18 1/23/19 If your referral has a status of pending review or denied, additional information will be sent to support the outcome of this decision. Patient Instructions Low Back Arthritis: Exercises Your Care Instructions Here are some examples of typical rehabilitation exercises for your condition. Start each exercise slowly. Ease off the exercise if you start to have pain. Your doctor or physical therapist will tell you when you can start these exercises and which ones will work best for you. When you are not being active, find a comfortable position for rest. Some people are comfortable on the floor or a medium-firm bed with a small pillow under their head and another under their knees. Some people prefer to lie on their side with a pillow between their knees. Don't stay in one position for too long. Take short walks (10 to 20 minutes) every 2 to 3 hours. Avoid slopes, hills, and stairs until you feel better. Walk only distances you can manage without pain, especially leg pain. How to do the exercises Pelvic tilt 1. Lie on your back with your knees bent. 2. \"Brace\" your stomach-tighten your muscles by pulling in and imagining your belly button moving toward your spine. 3. Press your lower back into the floor. You should feel your hips and pelvis rock back. 4. Hold for 6 seconds while breathing smoothly. 5. Relax and allow your pelvis and hips to rock forward. 6. Repeat 8 to 12 times. Back stretches 1. Get down on your hands and knees on the floor. 2. Relax your head and allow it to droop. Round your back up toward the ceiling until you feel a nice stretch in your upper, middle, and lower back. Hold this stretch for as long as it feels comfortable, or about 15 to 30 seconds. 3. Return to the starting position with a flat back while you are on your hands and knees. 4. Let your back sway by pressing your stomach toward the floor. Lift your buttocks toward the ceiling. 5. Hold this position for 15 to 30 seconds. 6. Repeat 2 to 4 times. Follow-up care is a key part of your treatment and safety. Be sure to make and go to all appointments, and call your doctor if you are having problems. It's also a good idea to know your test results and keep a list of the medicines you take. Where can you learn more? Go to http://rivka-jarrell.info/. Enter Q440 in the search box to learn more about \"Low Back Arthritis: Exercises. \" Current as of: March 21, 2017 Content Version: 11.4 © 3790-3401 Healthwise[employer]. Care instructions adapted under license by [employer] (which disclaims liability or warranty for this information). If you have questions about a medical condition or this instruction, always ask your healthcare professional. [address] any warranty or liability for your use of this information. Introducing [facility] & HEALTH SERVICES!    
 [facility] introduces Zipalong patient portal. Now you can access parts of your medical record, email your doctor's office, and request medication refills online. 1. In your internet browser, go to [email or web address] 2. Click on the First Time User? Click Here link in the Sign In box. You will see the New Member Sign Up page. 3. Enter your LiquidPractice Access Code exactly as it appears below. You will not need to use this code after youve completed the sign-up process. If you do not sign up before the expiration date, you must request a new code. · [employer] Access Code: [email or web address] Expires: 2/13/2018 12:24 PM 
 
4. Enter the last four digits of your Social Security Number (xxxx) and Date of Birth (mm/dd/yyyy) as indicated and click Submit. You will be taken to the next sign-up page. 5. Create a LiquidPractice ID. This will be your LiquidPractice login ID and cannot be changed, so think of one that is secure and easy to remember. 6. Create a [employer]Practice password. You can change your password at any time. 7. Enter your Password Reset Question and Answer. This can be used at a later time if you forget your password. 8. Enter your e-mail address. You will receive e-mail notification when new information is available in [address] 9. Click Sign Up. You can now view and download portions of your medical record. 10. Click the Download Summary menu link to download a portable copy of your medical information. If you have questions, please visit the Frequently Asked Questions section of the LiquidPractice website. Remember, LiquidPractice is NOT to be used for urgent needs. For medical emergencies, dial 911. Now available from your iPhone and Android! Please provide this summary of care documentation to your next provider. Your primary care clinician is listed as Charlie Platt. If you have any questions after today's visit, please call [phone].

## 2018-01-23 NOTE — PROGRESS NOTES
[facility]  [address], [address]  Phone: [phone]  Fax: [phone]      ASSESSMENT   Diagnoses and all orders for this visit:    1. Degeneration of lumbar intervertebral disc  -     MRI LUMB SPINE W WO CONT; Future    2. Lumbar spondylosis  -     MRI LUMB SPINE W WO CONT; Future    3. Left leg weakness    4. Neuritis         IMPRESSION AND PLAN:  Lakshmi Ravi is a 57 y.o. male with history of lumbar and bilateral knee pain. Pt complains of severe pain in the left leg extending from the mid calf to the ankle. He had an ultrasound to rule out a blood clot and left tib/fib x-rays that did not demonstrate any evidence of fracture. Pt reports minimal relief with Tylenol and Norco 5-325 mg. He has discussed a left knee replacement with Dr. Alexey Garcia. 1) Pt was given information on lumbar arthritis exercises. 2) He is not a candidate for NSAID's due to chronic anticoagulation with Coumadin. 3) A lumbar MRI was ordered. 4) Mr. Miguel Browning has a reminder for a \"due or due soon\" health maintenance. I have asked that he contact his primary care provider, Milo Lopez MD, for follow-up on this health maintenance. 5)  demonstrated consistency with prescribing. 6) Pt will follow-up in 2 weeks or sooner if needed. HISTORY OF PRESENT ILLNESS:  Lakshmi Ravi is a 57 y.o. male with history of lumbar and bilateral knee pain. Pt complains of severe pain in the left leg extending from the mid calf to the ankle. On 01/10/2018 he started to experience pain in the left leg from the knee to the ankle. His pain worsened so he followed up with Dr. Emily Berrios on Thursday, 01/19/2018, who referred him back to [facility] [address] [place] [address] Avenue. Pt was also referred to Katherine Alonso for an ultrasound to rule out a blood clot and per patient, this did not demonstrated any DVT's.  He states that his pain is so severe he experiences pain when touching his leg or when putting on a sock. Pt reports that he had a previous stress fracture in the left leg, thought this may be the source of his pain, and recently had left tib/fib x-rays at [facility]. He states that he has not yet reviewed the x-ray report. Pt takes Tylenol and Fioricet for headaches and reports minimal relief with Norco 5-325 mg. He followed up with Dr. Sivan Phan regarding his bilateral knee pain and discussed proceeding with a left knee replacement. Pt continues to experience lower back pain but his left leg pain is more severe at this time. Of note, he had a lumbar surgery with Dr. Kandice Do in 2000 and had a previous lumbar surgery in 1992. Pt at this time desires to proceed with a lumbar MRI. Pain Scale: 2/10    PCP: Margie Cole MD       Past Medical History:   Diagnosis Date    Arthritis     Bleeding     Blood clot in the legs     Esophageal reflux     Headache(784.0)     migraine    High cholesterol     Hypertension     Lower back pain 11/6/2010    Other chest pain     Personal history of venous thrombosis and embolism     Pure hypercholesterolemia     Right buttock pain 11/6/2010    Right foot pain     Spinal stenosis     Tendonitis, tibialis     anterior    Thromboembolus ([facility] [address]         Social History     Social History    Marital status:      Spouse name: N/A    Number of children: N/A    Years of education: N/A     Occupational History    Not on file.      Social History Main Topics    Smoking status: Never Smoker    Smokeless tobacco: Never Used    Alcohol use No    Drug use: No    Sexual activity: Not Currently     Other Topics Concern    Not on file     Social History Narrative       Current Outpatient Prescriptions   Medication Sig Dispense Refill    methylPREDNISolone (MEDROL DOSEPACK) 4 mg tablet TK UTD  0    butalbital-acetaminophen-caff (FIORICET) -40 mg per capsule Take 2 capsules every 8 hours as needed for headache 540 Cap 3    HYDROcodone-acetaminophen (NORCO) 5-325 mg per tablet TK 1 T PO Q 6 H PRN FOR PAIN 12 Tab 0    warfarin (COUMADIN) 5 mg tablet TAKE 2 AND 1/2 TABLETS BY MOUTH DAILY OR AS DIRECTED 75 Tab 0    montelukast (SINGULAIR) 10 mg tablet TAKE 1 TABLET BY MOUTH EVERY DAY 90 Tab 0    nystatin (MYCOSTATIN) topical cream Apply  to affected area two (2) times a day. 15 g 0    lisinopril-hydroCHLOROthiazide (PRINZIDE, ZESTORETIC) 20-12.5 mg per tablet Take 1 Tab by mouth daily. 90 Tab 1    B.infantis-B.ani-B.long-B.bifi (PROBIOTIC 4X) 10-15 mg TbEC Take  by mouth.  red yeast rice extract 600 mg cap Take 600 mg by mouth now.  metaxalone (SKELAXIN) 800 mg tablet Take 1 Tab by mouth three (3) times daily. Indications: MUSCLE SPASM 30 Tab 2    labetalol (NORMODYNE) 100 mg tablet TAKE ONE TABLET BY MOUTH TWICE DAILY 180 Tab 0    lansoprazole (PREVACID) 30 mg capsule TAKE 1 CAPSULE DAILY BEFOREBREAKFAST 90 Cap 3    raNITIdine (ZANTAC) 150 mg tablet TK 1 T PO HS  5    acetaminophen (TYLENOL) 500 mg tablet Take  by mouth every six (6) hours as needed for Pain.  loratadine (CLARITIN) 10 mg tablet Take 10 mg by mouth.  glucosamine-D3-Boswellia serr (OSTEO BI-FLEX, 5-LOXIN,) 1,500-400-100 mg-unit-mg tab Take  by mouth. Allergies   Allergen Reactions    Augmentin [Amoxicillin-Pot Clavulanate] Itching    Penicillins Rash         REVIEW OF SYSTEMS    Constitutional: Negative for fever, chills, or weight change. Respiratory: Negative for cough or shortness of breath. Cardiovascular: Negative for chest pain or palpitations. Gastrointestinal: Negative for acid reflux, change in bowel habits, or constipation. Genitourinary: Negative for dysuria and flank pain. Musculoskeletal: Positive for lumbar pain. Skin: Negative for rash. Neurological: Negative for headaches, dizziness, or numbness. Endo/Heme/Allergies: Negative for increased bruising.    Psychiatric/Behavioral: Negative for difficulty with sleep.      PHYSICAL EXAMINATION  Visit Vitals    /71    Temp 97.6 °F (36.4 °C) (Oral)    Ht 5' 10\" (1.778 m)    Wt 220 lb 12.8 oz (100.2 kg)    SpO2 98%    BMI 31.68 kg/m2       Constitutional: Awake, alert, and in no acute distress. Neurological: 1+ symmetrical DTRs in the upper extremities. 1+ symmetrical DTRs in the lower extremities. Sensation to light touch is intact. Negative Eliecer's sign bilaterally. Skin: warm, dry, and intact. Musculoskeletal: Tenderness to palpation in the leg extending from the mid calf to the ankle. Moderate pain with extension and axial loading. No pain with internal or external rotation of his hips. Negative straight leg raise bilaterally. Patient ambulates with the assistance of a single point cane. Hip Flex  Quads Hamstrings Ankle DF EHL Ankle PF   Right +4/5 +4/5 +4/5 +4/5 +4/5 +4/5   Left -4/5 -4/5 -4/5 -4/5 -4/5 -4/5     IMAGING:    Left tib/fib x-rays from 01/19/2018 were personally reviewed with the patient and demonstrated:    Results from [place] encounter on 01/19/18   XR TIB/FIB LT   Narrative LEFT TIBIA/FIBULA, TWO VIEWS    CPT CODE: 57014    INDICATION: Left lower leg pain x1 week, no known trauma    COMPARISON: None    TECHNIQUE: AP and lateral radiographs of the left tibia and fibula are reviewed.  ]     FINDINGS:    There is no evidence of acute osseous injury. No radiographic evidence of  significant localized soft tissue swelling is seen. Mild degenerative changes at the knee with mild joint space narrowing best seen  at the medial compartment and very small periarticular osteophytosis best seen  along the posterior superior patella. Chondrocalcinosis at the knee, which is not specific but often seen in the  setting of CPPD.     Tiny calcific appearing densities project  inferior to the patella on lateral  view, overlying the soft tissues medial to the distal tibia on AP image, and  anterior to the proximal to mid tibia on lateral projection, nonspecific. Impression Impression:     No evidence of acute fracture. Chondrocalcinosis. Mild degenerative changes at the left knee. Few small scattered soft tissue calcifications noted. Bilateral knee x-rays from 03/14/2017 were personally reviewed with the patient and demonstrated:  Medial compartment narrowing bilaterally, L>R     Cervical Spine MRI from 12/19/2016 was personally reviewed with the Pt and demonstrated:  Results from Hospital Encounter on 12/19/16   MRI CERV SPINE WO CONT     Narrative MRI of cervical spine without contrast    HISTORY: Chronic progressive neck pain with headaches. COMPARISON: No prior MRI. Cervical spine radiographs from 12/1/2016. TECHNIQUE: T1 weighted, T2 FSE, FSE inversion recovery sagittal images are  supplemented by T2 weighted and GRE/medic axial images. FINDINGS: Normal alignment and vertebral body heights. Normal marrow signal  except for mild endplate degenerative change. Cervical cord is normal in signal  and caliber. Visualized posterior fossa contents look normal. Paraspinal soft  tissues look normal.    C2-3: No significant degenerative disc disease or spinal stenosis. C3-4: Small nonfocal disc protrusion which contacts the ventral cord and causes  borderline spinal stenosis. No foraminal stenosis. C4-5: No significant degenerative disc disease. Mildly hypertrophic facets. No  spinal stenosis. C5-6: Disc is narrowed with diffusely bulging disc and osteophyte complex. Facets are mildly hypertrophic. Mild spinal canal and moderate left foraminal  stenoses. C6-7: Disc is narrowed with diffusely bulging disc and osteophyte complex. Facets are mildly hypertrophic. Mild spinal canal and moderate bilateral  foraminal stenoses. C7-T1: No significant degenerative disc disease or spinal stenosis.               Impression Impression:    Disc osteophyte complexes causing mild spinal canal stenoses at C5-6 and C6-7.    Moderate left foraminal stenosis at C5-6 and moderate bilateral foraminal  stenoses at C6-7.               Lumbar Spine MRI from 1/23/2013 was personally reviewed with the Pt and demonstrated:  Final result (Exam End: 1/23/2013  6:57 PM) Open      Study Result   MRI lumbar spine with and without contrast      Comparison: August 1, 2011      Clinical information: History of prior back surgery, now with right-sided pain.      Procedure:      MRI of the lumbar spine was performed prior to and following the uneventful  administration of 20 cc of gadolinium intravenously. Sequences included:  Sagittal T1, sagittal T1 postcontrast, sagittal inversion recovery, sagittal  T2, axial T1, axial T1 postcontrast, and axial T2 with fat saturation.      Findings:      Vertebral body heights are maintained. There are mild degenerative endplate  changes at X9-V5. No evidence for fracture. Alignment is anatomic, without  listhesis.     There is mild disc narrowing at L4-5 and moderate disc narrowing at L5-S1 with  desiccation.      The conus terminates at the L1 level.      Susceptibility artifact present in the soft tissues of the lower back  consistent with prior surgical intervention.      Correlation of axial and sagittal images reveals the following:      At L1-L2: No significant disc pathology or proliferative changes. No central  canal or foraminal stenosis.     At L2-L3: No significant disc pathology or proliferative changes. No central  canal or foraminal stenosis.     At L3-L4: Mild bulging of the posterolateral disc corners. Mild facet  arthropathy and ligamentous buckling. The canal is patent. Mild right greater  than left foraminal narrowing. Minimal progression.      At L4-L5: Prior left hemilaminectomy. Mild circumferential disc bulge,  eccentric along the posterior right disc margin. Mild facet arthropathy. Moderate right ligamentous hypertrophy. There is mild narrowing of the lateral  recesses.  Mild central canal narrowing. Moderate right and mild left foraminal  narrowing. Findings look overall unchanged.      At L5-S1: Prior right hemilaminectomies. Mild posterior disc bulge/osteophyte  complex. Moderate facet arthropathy. Canal is patent. Moderate bilateral  foraminal narrowing. Findings look stable.      Visualized portions of the sacroiliac joints are unremarkable. Incidentally  imaged retroperitoneal structures are unremarkable as well.          IMPRESSION:  Postsurgical change at L4-5 and L5-S1. Central canal narrowing at L4-5 and  foraminal stenosis at L4-5/L5-S1 appear stable when compared to prior study.      Minimal progression of mild degenerative changes at L3-4. Written by Ria Cranker, as dictated by Elbert Castanon MD.  I, Dr. Elbert Castanon confirm that all documentation is accurate.

## 2018-01-24 ENCOUNTER — HOSPITAL ENCOUNTER (OUTPATIENT)
Age: 63
Discharge: HOME | End: 2018-01-24
Attending: PHYSICAL MEDICINE & REHABILITATION
Payer: OTHER GOVERNMENT

## 2018-01-24 DIAGNOSIS — M47.816 LUMBAR SPONDYLOSIS: ICD-10-CM

## 2018-01-24 DIAGNOSIS — M51.36 DEGENERATION OF LUMBAR INTERVERTEBRAL DISC: ICD-10-CM

## 2018-01-24 LAB — CREAT UR-MCNC: 0.9 MG/DL (ref 0.6–1.3)

## 2018-01-24 PROCEDURE — 74011250636 HC RX REV CODE- 250/636: Performed by: PHYSICAL MEDICINE & REHABILITATION

## 2018-01-24 PROCEDURE — 72158 MRI LUMBAR SPINE W/O & W/DYE: CPT

## 2018-01-24 PROCEDURE — 82565 ASSAY OF CREATININE: CPT

## 2018-01-24 PROCEDURE — A9585 GADOBUTROL INJECTION: HCPCS | Performed by: PHYSICAL MEDICINE & REHABILITATION

## 2018-01-24 RX ADMIN — GADOBUTROL 10 ML: 604.72 INJECTION INTRAVENOUS at 19:00

## 2018-01-24 NOTE — TELEPHONE ENCOUNTER
Per Dr. Cali Waite, I called patient to let him know that the X-ray of his legs is fine. Verbalized understanding.

## 2018-01-31 ENCOUNTER — HOSPITAL ENCOUNTER (OUTPATIENT)
Dept: PREADMISSION TESTING | Age: 63
Discharge: HOME | End: 2018-01-31
Payer: COMMERCIAL

## 2018-01-31 ENCOUNTER — HOSPITAL ENCOUNTER (OUTPATIENT)
Dept: GENERAL RADIOLOGY | Age: 63
Discharge: HOME | End: 2018-01-31
Payer: COMMERCIAL

## 2018-01-31 DIAGNOSIS — Z01.818 PREOP EXAMINATION: ICD-10-CM

## 2018-01-31 LAB
ABO + RH BLD: NORMAL
ALBUMIN SERPL-MCNC: 3.9 G/DL (ref 3.4–5)
ANION GAP SERPL CALC-SCNC: 7 MMOL/L (ref 3–18)
APPEARANCE UR: CLEAR
APTT PPP: 31.3 SEC (ref 23–36.4)
BASOPHILS # BLD: 0 K/UL (ref 0–0.06)
BASOPHILS NFR BLD: 0 % (ref 0–2)
BILIRUB UR QL: NEGATIVE
BLOOD GROUP ANTIBODIES SERPL: NORMAL
BUN SERPL-MCNC: 12 MG/DL (ref 7–18)
BUN/CREAT SERPL: 13 (ref 12–20)
CALCIUM SERPL-MCNC: 9.3 MG/DL (ref 8.5–10.1)
CHLORIDE SERPL-SCNC: 103 MMOL/L (ref 100–108)
CO2 SERPL-SCNC: 30 MMOL/L (ref 21–32)
COLOR UR: YELLOW
CREAT SERPL-MCNC: 0.92 MG/DL (ref 0.6–1.3)
DIFFERENTIAL METHOD BLD: ABNORMAL
EOSINOPHIL # BLD: 0.2 K/UL (ref 0–0.4)
EOSINOPHIL NFR BLD: 2 % (ref 0–5)
ERYTHROCYTE [DISTWIDTH] IN BLOOD BY AUTOMATED COUNT: 12.5 % (ref 11.6–14.5)
EST. AVERAGE GLUCOSE BLD GHB EST-MCNC: 120 MG/DL
GLUCOSE SERPL-MCNC: 89 MG/DL (ref 74–99)
GLUCOSE UR STRIP.AUTO-MCNC: NEGATIVE MG/DL
HBA1C MFR BLD: 5.8 % (ref 4.2–5.6)
HCT VFR BLD AUTO: 42.9 % (ref 36–48)
HGB BLD-MCNC: 14.7 G/DL (ref 13–16)
HGB UR QL STRIP: NEGATIVE
INR PPP: 1.4 (ref 0.8–1.2)
KETONES UR QL STRIP.AUTO: NEGATIVE MG/DL
LEUKOCYTE ESTERASE UR QL STRIP.AUTO: NEGATIVE
LYMPHOCYTES # BLD: 1.4 K/UL (ref 0.9–3.6)
LYMPHOCYTES NFR BLD: 20 % (ref 21–52)
MCH RBC QN AUTO: 33.3 PG (ref 24–34)
MCHC RBC AUTO-ENTMCNC: 34.3 G/DL (ref 31–37)
MCV RBC AUTO: 97.1 FL (ref 74–97)
MONOCYTES # BLD: 0.9 K/UL (ref 0.05–1.2)
MONOCYTES NFR BLD: 12 % (ref 3–10)
NEUTS SEG # BLD: 4.8 K/UL (ref 1.8–8)
NEUTS SEG NFR BLD: 66 % (ref 40–73)
NITRITE UR QL STRIP.AUTO: NEGATIVE
PH UR STRIP: 7 [PH] (ref 5–8)
PLATELET # BLD AUTO: 184 K/UL (ref 135–420)
PMV BLD AUTO: 11.3 FL (ref 9.2–11.8)
POTASSIUM SERPL-SCNC: 4.1 MMOL/L (ref 3.5–5.5)
PROT UR STRIP-MCNC: NEGATIVE MG/DL
PROTHROMBIN TIME: 17 SEC (ref 11.5–15.2)
RBC # BLD AUTO: 4.42 M/UL (ref 4.7–5.5)
SODIUM SERPL-SCNC: 140 MMOL/L (ref 136–145)
SP GR UR REFRACTOMETRY: 1.01 (ref 1–1.03)
SPECIMEN EXP DATE BLD: NORMAL
UROBILINOGEN UR QL STRIP.AUTO: 0.2 EU/DL (ref 0.2–1)
WBC # BLD AUTO: 7.3 K/UL (ref 4.6–13.2)

## 2018-01-31 PROCEDURE — 80048 BASIC METABOLIC PNL TOTAL CA: CPT | Performed by: PHYSICIAN ASSISTANT

## 2018-01-31 PROCEDURE — 71046 X-RAY EXAM CHEST 2 VIEWS: CPT

## 2018-01-31 PROCEDURE — 87086 URINE CULTURE/COLONY COUNT: CPT | Performed by: PHYSICIAN ASSISTANT

## 2018-01-31 PROCEDURE — 85730 THROMBOPLASTIN TIME PARTIAL: CPT | Performed by: PHYSICIAN ASSISTANT

## 2018-01-31 PROCEDURE — 83036 HEMOGLOBIN GLYCOSYLATED A1C: CPT | Performed by: PHYSICIAN ASSISTANT

## 2018-01-31 PROCEDURE — 36415 COLL VENOUS BLD VENIPUNCTURE: CPT | Performed by: PHYSICIAN ASSISTANT

## 2018-01-31 PROCEDURE — 85025 COMPLETE CBC W/AUTO DIFF WBC: CPT | Performed by: PHYSICIAN ASSISTANT

## 2018-01-31 PROCEDURE — 93005 ELECTROCARDIOGRAM TRACING: CPT

## 2018-01-31 PROCEDURE — 82040 ASSAY OF SERUM ALBUMIN: CPT | Performed by: PHYSICIAN ASSISTANT

## 2018-01-31 PROCEDURE — 81003 URINALYSIS AUTO W/O SCOPE: CPT | Performed by: PHYSICIAN ASSISTANT

## 2018-01-31 PROCEDURE — 86901 BLOOD TYPING SEROLOGIC RH(D): CPT | Performed by: PHYSICIAN ASSISTANT

## 2018-01-31 PROCEDURE — 85610 PROTHROMBIN TIME: CPT | Performed by: PHYSICIAN ASSISTANT

## 2018-02-01 ENCOUNTER — DOCUMENTATION ONLY (OUTPATIENT)
Dept: ORTHOPEDIC SURGERY | Facility: CLINIC | Age: 63
End: 2018-02-01

## 2018-02-01 ENCOUNTER — OFFICE VISIT (OUTPATIENT)
Dept: INTERNAL MEDICINE CLINIC | Age: 63
End: 2018-02-01

## 2018-02-01 ENCOUNTER — OFFICE VISIT (OUTPATIENT)
Dept: ORTHOPEDIC SURGERY | Facility: CLINIC | Age: 63
End: 2018-02-01

## 2018-02-01 VITALS
BODY MASS INDEX: 31.44 KG/M2 | TEMPERATURE: 96 F | SYSTOLIC BLOOD PRESSURE: 148 MMHG | HEIGHT: 70 IN | DIASTOLIC BLOOD PRESSURE: 78 MMHG | HEART RATE: 69 BPM | RESPIRATION RATE: 18 BRPM | OXYGEN SATURATION: 97 % | WEIGHT: 219.6 LBS

## 2018-02-01 VITALS
TEMPERATURE: 98.8 F | WEIGHT: 220 LBS | SYSTOLIC BLOOD PRESSURE: 128 MMHG | DIASTOLIC BLOOD PRESSURE: 68 MMHG | HEART RATE: 74 BPM | HEIGHT: 70 IN | RESPIRATION RATE: 16 BRPM | OXYGEN SATURATION: 98 % | BODY MASS INDEX: 31.5 KG/M2

## 2018-02-01 DIAGNOSIS — Z79.01 WARFARIN ANTICOAGULATION: ICD-10-CM

## 2018-02-01 DIAGNOSIS — M17.12 PRIMARY OSTEOARTHRITIS OF LEFT KNEE: ICD-10-CM

## 2018-02-01 DIAGNOSIS — I10 ESSENTIAL HYPERTENSION: ICD-10-CM

## 2018-02-01 DIAGNOSIS — Z01.818 PRE-OP EXAM: Primary | ICD-10-CM

## 2018-02-01 DIAGNOSIS — M17.0 PRIMARY OSTEOARTHRITIS OF BOTH KNEES: Primary | ICD-10-CM

## 2018-02-01 LAB
ATRIAL RATE: 62 BPM
BACTERIA SPEC CULT: NORMAL
CALCULATED P AXIS, ECG09: 58 DEGREES
CALCULATED R AXIS, ECG10: 12 DEGREES
CALCULATED T AXIS, ECG11: 64 DEGREES
DIAGNOSIS, 93000: NORMAL
P-R INTERVAL, ECG05: 180 MS
Q-T INTERVAL, ECG07: 394 MS
QRS DURATION, ECG06: 86 MS
QTC CALCULATION (BEZET), ECG08: 399 MS
SERVICE CMNT-IMP: NORMAL
VENTRICULAR RATE, ECG03: 62 BPM

## 2018-02-01 RX ORDER — ENOXAPARIN SODIUM 100 MG/ML
INJECTION SUBCUTANEOUS
Qty: 12 SYRINGE | Refills: 0 | Status: SHIPPED | OUTPATIENT
Start: 2018-02-01 | End: 2018-02-21

## 2018-02-01 RX ORDER — ENOXAPARIN SODIUM 100 MG/ML
INJECTION SUBCUTANEOUS
Qty: 12 SYRINGE | Refills: 0 | Status: SHIPPED | OUTPATIENT
Start: 2018-02-01 | End: 2018-02-01 | Stop reason: SDUPTHER

## 2018-02-01 NOTE — PROGRESS NOTES
The patient presents to the office today with the chief complaint of left knee pain and for a pre op evaluation    HPI    [place] complains of left knee pain and disability due to severe osteoarthritis of his left knee. he is now scheduled for surgical correction. Other than his knee the patient continues with complaints of chronic headaches. The patient denies chest pain or dyspnea. The patient remains on medications for chronic DVT in his legs. The patient is doing well on the Coumadin. The patient recently was evaluated by vascular surgery. The recommendation is for holding Coumadin with bridging Lovenox perioperatively. Review of Systems   Respiratory: Negative for shortness of breath. Cardiovascular: Negative for chest pain and leg swelling. Allergies   Allergen Reactions    Augmentin [Amoxicillin-Pot Clavulanate] Itching    Penicillins Rash       Current Outpatient Prescriptions   Medication Sig Dispense Refill    enoxaparin (LOVENOX) 100 mg/mL Stop Warfarin 2/6; Inject sub-q as follows; 1 dose thur 2/8  night, 1 dose twice daily Friday,  1 dose Sat morning; resume post op 12 Syringe 0    butalbital-acetaminophen-caff (FIORICET) -40 mg per capsule Take 2 capsules every 8 hours as needed for headache 540 Cap 3    warfarin (COUMADIN) 5 mg tablet TAKE 2 AND 1/2 TABLETS BY MOUTH DAILY OR AS DIRECTED 75 Tab 0    montelukast (SINGULAIR) 10 mg tablet TAKE 1 TABLET BY MOUTH EVERY DAY 90 Tab 0    nystatin (MYCOSTATIN) topical cream Apply  to affected area two (2) times a day. 15 g 0    lisinopril-hydroCHLOROthiazide (PRINZIDE, ZESTORETIC) 20-12.5 mg per tablet Take 1 Tab by mouth daily. 90 Tab 1    B.infantis-B.ani-B.long-B.bifi (PROBIOTIC 4X) 10-15 mg TbEC Take  by mouth.  red yeast rice extract 600 mg cap Take 600 mg by mouth now.  metaxalone (SKELAXIN) 800 mg tablet Take 1 Tab by mouth three (3) times daily. Indications:  MUSCLE SPASM 30 Tab 2    labetalol (NORMODYNE) 100 mg tablet TAKE ONE TABLET BY MOUTH TWICE DAILY 180 Tab 0    lansoprazole (PREVACID) 30 mg capsule TAKE 1 CAPSULE DAILY BEFOREBREAKFAST 90 Cap 3    raNITIdine (ZANTAC) 150 mg tablet TK 1 T PO HS  5    acetaminophen (TYLENOL) 500 mg tablet Take  by mouth every six (6) hours as needed for Pain. Past Medical History:   Diagnosis Date    Arthritis     Bleeding     Blood clot in the legs     Esophageal reflux     GERD (gastroesophageal reflux disease)     Headache(784.0)     migraine    High cholesterol     Hypertension     Lower back pain 11/6/2010    Other chest pain     Personal history of venous thrombosis and embolism     Pure hypercholesterolemia     Right buttock pain 11/6/2010    Right foot pain     Spinal stenosis     Tendonitis, tibialis     anterior    Thromboembolus ([facility] [address]     3 after sx        Past Surgical History:   Procedure Laterality Date    FOOT/TOES SURGERY PROC UNLISTED      HX BACK SURGERY      HX ORTHOPAEDIC  06-25-12    Right foot with excision of bursa and adipose tissue from fifth metatarsal base by Dr. Savana Estes      lower back (1992 and 2000)    HX OTHER SURGICAL      left foot (2008)    LAMINOTOMY      NERVE BLOCK         Social History     Social History    Marital status:      Spouse name: N/A    Number of children: N/A    Years of education: N/A     Occupational History    Not on file.      Social History Main Topics    Smoking status: Never Smoker    Smokeless tobacco: Never Used    Alcohol use No    Drug use: No    Sexual activity: Not Currently     Other Topics Concern    Not on file     Social History Narrative       Patient does not have an advanced directive on file    Visit Vitals    /68 (BP 1 Location: Left arm, BP Patient Position: Sitting)    Pulse 74    Temp 98.8 °F (37.1 °C) (Tympanic)    Resp 16    Ht 5' 10\" (1.778 m)    Wt 220 lb (99.8 kg)    SpO2 98%    BMI 31.57 kg/m2 Physical Exam   No Cervical Lymphadenopathy  No Supraclavicular Lymphadenopathy  Thyroid is Normal  Lungs are normal to percussion. Clear to auscultation   Heart:  S1 S2 are normal, No gallops, No mummers  No Carotid Bruits  Abdomen:  Normal Bowel Sounds. No tenderness. No masses. No Hepatomegaly or Splenomegly  LE:  Strong Pedal Pulses. No Edema  Left knee with severe osteoarthritis. No fluid    BMI:  Once the patient has healed from the surgery he should limit his calories to 2000 daily    Hospital Outpatient Visit on 01/31/2018   Component Date Value Ref Range Status    WBC 01/31/2018 7.3  4.6 - 13.2 K/uL Final    RBC 01/31/2018 4.42* 4.70 - 5.50 M/uL Final    HGB 01/31/2018 14.7  13.0 - 16.0 g/dL Final    HCT 01/31/2018 42.9  36.0 - 48.0 % Final    MCV 01/31/2018 97.1* 74.0 - 97.0 FL Final    MCH 01/31/2018 33.3  24.0 - 34.0 PG Final    MCHC 01/31/2018 34.3  31.0 - 37.0 g/dL Final    RDW 01/31/2018 12.5  11.6 - 14.5 % Final    PLATELET 21/29/4983 925  135 - 420 K/uL Final    MPV 01/31/2018 11.3  9.2 - 11.8 FL Final    NEUTROPHILS 01/31/2018 66  40 - 73 % Final    LYMPHOCYTES 01/31/2018 20* 21 - 52 % Final    MONOCYTES 01/31/2018 12* 3 - 10 % Final    EOSINOPHILS 01/31/2018 2  0 - 5 % Final    BASOPHILS 01/31/2018 0  0 - 2 % Final    ABS. NEUTROPHILS 01/31/2018 4.8  1.8 - 8.0 K/UL Final    ABS. LYMPHOCYTES 01/31/2018 1.4  0.9 - 3.6 K/UL Final    ABS. MONOCYTES 01/31/2018 0.9  0.05 - 1.2 K/UL Final    ABS. EOSINOPHILS 01/31/2018 0.2  0.0 - 0.4 K/UL Final    ABS.  BASOPHILS 01/31/2018 0.0  0.0 - 0.06 K/UL Final    DF 01/31/2018 AUTOMATED    Final    Prothrombin time 01/31/2018 17.0* 11.5 - 15.2 sec Final    INR 01/31/2018 1.4* 0.8 - 1.2   Final    Comment:            INR Therapeutic Ranges         (on stable oral anticoagulant):     INDICATION                INR  DVT/PE/Atrial Fib          2.0-3.0  MI/Mechanical Heart Valve  2.5-3.5      aPTT 01/31/2018 31.3  23.0 - 36.4 SEC Final  Sodium 01/31/2018 140  136 - 145 mmol/L Final    Potassium 01/31/2018 4.1  3.5 - 5.5 mmol/L Final    Chloride 01/31/2018 103  100 - 108 mmol/L Final    CO2 01/31/2018 30  21 - 32 mmol/L Final    Anion gap 01/31/2018 7  3.0 - 18 mmol/L Final    Glucose 01/31/2018 89  74 - 99 mg/dL Final    BUN 01/31/2018 12  7.0 - 18 MG/DL Final    Creatinine 01/31/2018 0.92  0.6 - 1.3 MG/DL Final    BUN/Creatinine ratio 01/31/2018 13  12 - 20   Final    GFR est AA 01/31/2018 >60  >60 ml/min/1.73m2 Final    GFR est non-AA 01/31/2018 >60  >60 ml/min/1.73m2 Final    Comment: (NOTE)  Estimated GFR is calculated using the Modification of Diet in Renal   Disease (MDRD) Study equation, reported for both  Americans   (GFRAA) and non- Americans (GFRNA), and normalized to 1.73m2   body surface area. The physician must decide which value applies to   the patient. The MDRD study equation should only be used in   individuals age 25 or older. It has not been validated for the   following: pregnant women, patients with serious comorbid conditions,   or on certain medications, or persons with extremes of body size,   muscle mass, or nutritional status.  Calcium 01/31/2018 9.3  8.5 - 10.1 MG/DL Final    Albumin 01/31/2018 3.9  3.4 - 5.0 g/dL Final    Hemoglobin A1c 01/31/2018 5.8* 4.2 - 5.6 % Final    Comment: (NOTE)  HbA1C Interpretive Ranges  <5.7              Normal  5.7 - 6.4         Consider Prediabetes  >6.5              Consider Diabetes      Est. average glucose 01/31/2018 120  mg/dL Final    Comment: (NOTE)  The eAG should be interpreted with patient characteristics in mind   since ethnicity, interindividual differences, red cell lifespan,   variation in rates of glycation, etc. may affect the validity of the   calculation.       Crossmatch Expiration 01/31/2018 02/14/2018   Final    ABO/Rh(D) 01/31/2018 B POSITIVE   Final    Antibody screen 01/31/2018 NEG   Final    Color 01/31/2018 YELLOW    Final  Appearance 01/31/2018 CLEAR    Final    Specific gravity 01/31/2018 1.012  1.005 - 1.030   Final    pH (UA) 01/31/2018 7.0  5.0 - 8.0   Final    Protein 01/31/2018 NEGATIVE   NEG mg/dL Final    Glucose 01/31/2018 NEGATIVE   NEG mg/dL Final    Ketone 01/31/2018 NEGATIVE   NEG mg/dL Final    Bilirubin 01/31/2018 NEGATIVE   NEG   Final    Blood 01/31/2018 NEGATIVE   NEG   Final    Urobilinogen 01/31/2018 0.2  0.2 - 1.0 EU/dL Final    Nitrites 01/31/2018 NEGATIVE   NEG   Final    Leukocyte Esterase 01/31/2018 NEGATIVE   NEG   Final    Special Requests: 01/31/2018 NO SPECIAL REQUESTS    Final    Culture result: 01/31/2018 NO GROWTH 1 DAY    Final    Ventricular Rate 01/31/2018 62  BPM Final    Atrial Rate 01/31/2018 62  BPM Final    P-R Interval 01/31/2018 180  ms Final    QRS Duration 01/31/2018 86  ms Final    Q-T Interval 01/31/2018 394  ms Final    QTC Calculation (Bezet) 01/31/2018 399  ms Final    Calculated P Axis 01/31/2018 58  degrees Final    Calculated R Axis 01/31/2018 12  degrees Final    Calculated T Axis 01/31/2018 64  degrees Final    Diagnosis 01/31/2018    Final                    Value:Normal sinus rhythm  Possible Left atrial enlargement  Cannot exclude Inferior infarct , age undetermined  Nonspecific ST and T wave abnormality Lateral leads  Borderline ECG  When compared with ECG of 11-JUN-2012 12:04,  No significant change was found  Confirmed by Scottie Walton ([address]) on 2/1/2018 7:08:50 AM     Hospital Outpatient Visit on 01/24/2018   Component Date Value Ref Range Status    Creatinine, POC 01/24/2018 0.9  0.6 - 1.3 MG/DL Final    GFRAA, POC 01/24/2018 >60  >60 ml/min/1.73m2 Final    GFRNA, POC 01/24/2018 >60  >60 ml/min/1.73m2 Final    Comment: Estimated GFR is calculated using the IDMS-traceable Modification of Diet in Renal Disease (MDRD) Study equation, reported for both  Americans (GFRAA) and non- Americans (GFRNA), and normalized to 1.73m2 body surface area. The physician must decide which value applies to the patient. The MDRD study equation should only be used in individuals age 25 or older. It has not been validated for the following: pregnant women, patients with serious comorbid conditions, or on certain medications, or persons with extremes of body size, muscle mass, or nutritional status. Office Visit on 01/18/2018   Component Date Value Ref Range Status    VALID INTERNAL CONTROL POC 01/18/2018 Yes   Final    INR POC 01/18/2018 2.1   Final   Anti-Coag visit on 01/02/2018   Component Date Value Ref Range Status    VALID INTERNAL CONTROL POC 01/02/2018 Yes   Final    INR POC 01/02/2018 2.1   Final   Anti-Coag visit on 12/11/2017   Component Date Value Ref Range Status    VALID INTERNAL CONTROL POC 12/11/2017 Yes   Final    INR POC 12/11/2017 2.7   Final   Anti-Coag visit on 11/15/2017   Component Date Value Ref Range Status    VALID INTERNAL CONTROL POC 11/15/2017 Yes   Final    INR POC 11/15/2017 2.6   Final       .No results found for any visits on 02/01/18. Pre op testing:        Labs:  OK      Chest Xray:  OK      EKG:  Possible Left Atrial Enlargement. Inferior wall Q waves but not of enough width to be significant    Assessment / Plan      ICD-10-CM ICD-9-CM    1. Pre-op exam Z01.818 V72.84    2. Warfarin anticoagulation Z79.01 V58.61    3. Essential hypertension I10 401.9    4. Primary osteoarthritis of left knee M17.12 715.16        THE PATIENT IS OK FOR SURGERY  Will bridge Coumadin with Lovenox. I will be happy to manage the anticoagulation with the patient returns home from surgery  Continue BP medications on schedule      Follow-up Disposition:  Return in about 6 weeks (around 3/15/2018). I asked Liz Caldwell if he has any questions and I answered the questions.   Liz Caldwell states that he understands the treatment plan and agrees with the treatment plan

## 2018-02-01 NOTE — PROGRESS NOTES
Patient: Yunior Hagen                MRN: [id number]       SSN: [id number]  YOB: 1955        AGE: 57 y.o. SEX: male  Body mass index is 31.51 kg/(m^2). PCP: Rodrigo Beach MD  02/01/18    HISTORY: Doni Kuhn returns with his wife with regards to severe left knee pain for left knee replacement. We sent him to Dr. Skyler Christian and Melba Owens P.A.-C., for evaluation for a filter. He is on Coumadin. Dr. Ellouise Claude has actually written a prescription for Lovenox for him, and we are going to stop his Coumadin roughly four or five days prior to the surgery, and we will recheck the INR the day before. He is going to be bridged with Lovenox. We had a lengthy discussion regarding risks and benefits, including recurrent DVT, pulmonary embolism, anesthetic complications, blood loss requiring transfusion, pain, stiffness, and the importance of actively flexing and extending the knee on an hourly basis at least 10 times a day to avoid permanent stiffness or arthrofibrosis. PHYSICAL EXAMINATION:  On examination today, he is a very pleasant gentleman. He does wear his stockings. He is very good. Minimal peripheral edema is seen. There is a little bit of venostasis. Both feet are warm and well perfused. The knee itself has a couple degrees fixed flexion deformity. It bends fairly well to about 112°. The calf is nontender. There is just slight evidence of neuropathy distally. The hips are noncontributory. RADIOGRAPHS:  Review of his x-rays confirms severe arthritis of the left knee. PLAN:  All questions were answered. He would like to proceed. I look forward to helping him. REVIEW OF SYSTEMS:      CON: negative for weight loss, fever  EYE: negative for double vision  ENT: negative for hoarseness  RS:   negative for Tb  GI:    negative for blood in stool  :  negative for blood in urine  Other systems reviewed and noted below.           Past Medical History: Diagnosis Date    Arthritis     Bleeding     Blood clot in the legs     Esophageal reflux     GERD (gastroesophageal reflux disease)     Headache(784.0)     migraine    High cholesterol     Hypertension     Lower back pain 11/6/2010    Other chest pain     Personal history of venous thrombosis and embolism     Pure hypercholesterolemia     Right buttock pain 11/6/2010    Right foot pain     Spinal stenosis     Tendonitis, tibialis     anterior    Thromboembolus ([facility] [address]     3 after sx        Family History   Problem Relation Age of Onset   Filbert Free Arthritis-rheumatoid Mother     Dementia Mother     Heart Disease Father     Cancer Father     Hypertension Other        Current Outpatient Prescriptions   Medication Sig Dispense Refill    loratadine (CLARITIN) 10 mg tablet Take 10 mg by mouth.  butalbital-acetaminophen-caff (FIORICET) -40 mg per capsule Take 2 capsules every 8 hours as needed for headache 540 Cap 3    warfarin (COUMADIN) 5 mg tablet TAKE 2 AND 1/2 TABLETS BY MOUTH DAILY OR AS DIRECTED 75 Tab 0    montelukast (SINGULAIR) 10 mg tablet TAKE 1 TABLET BY MOUTH EVERY DAY 90 Tab 0    nystatin (MYCOSTATIN) topical cream Apply  to affected area two (2) times a day. 15 g 0    lisinopril-hydroCHLOROthiazide (PRINZIDE, ZESTORETIC) 20-12.5 mg per tablet Take 1 Tab by mouth daily. 90 Tab 1    B.infantis-B.ani-B.long-B.bifi (PROBIOTIC 4X) 10-15 mg TbEC Take  by mouth.  red yeast rice extract 600 mg cap Take 600 mg by mouth now.  metaxalone (SKELAXIN) 800 mg tablet Take 1 Tab by mouth three (3) times daily. Indications: MUSCLE SPASM 30 Tab 2    labetalol (NORMODYNE) 100 mg tablet TAKE ONE TABLET BY MOUTH TWICE DAILY 180 Tab 0    lansoprazole (PREVACID) 30 mg capsule TAKE 1 CAPSULE DAILY BEFOREBREAKFAST 90 Cap 3    acetaminophen (TYLENOL) 500 mg tablet Take  by mouth every six (6) hours as needed for Pain.       enoxaparin (LOVENOX) 100 mg/mL Stop Warfarin 2/6; Inject sub-q as follows; 1 dose thur 2/8  night, 1 dose twice daily Friday,  1 dose Sat morning; resume post op 12 Syringe 0    methylPREDNISolone (MEDROL DOSEPACK) 4 mg tablet TK UTD  0    HYDROcodone-acetaminophen (NORCO) 5-325 mg per tablet TK 1 T PO Q 6 H PRN FOR PAIN 12 Tab 0    raNITIdine (ZANTAC) 150 mg tablet TK 1 T PO HS  5       Allergies   Allergen Reactions    Augmentin [Amoxicillin-Pot Clavulanate] Itching    Penicillins Rash       Past Surgical History:   Procedure Laterality Date    FOOT/TOES SURGERY PROC UNLISTED      HX BACK SURGERY      HX ORTHOPAEDIC  06-25-12    Right foot with excision of bursa and adipose tissue from fifth metatarsal base by Dr. Fabiola Preciado      lower back (1992 and 2000)    HX OTHER SURGICAL      left foot (2008)    LAMINOTOMY      NERVE BLOCK         Social History     Social History    Marital status:      Spouse name: N/A    Number of children: N/A    Years of education: N/A     Occupational History    Not on file. Social History Main Topics    Smoking status: Never Smoker    Smokeless tobacco: Never Used    Alcohol use No    Drug use: No    Sexual activity: Not Currently     Other Topics Concern    Not on file     Social History Narrative       Visit Vitals    /78    Pulse 69    Temp 96 °F (35.6 °C) (Oral)    Resp 18    Ht 5' 10\" (1.778 m)    Wt 219 lb 9.6 oz (99.6 kg)    SpO2 97%    BMI 31.51 kg/m2         PHYSICAL EXAMINATION:  GENERAL: Alert and oriented x3, in no acute distress, well-developed, well-nourished, afebrile. HEART: No JVD. EYES: No scleral icterus   NECK: No significant lymphadenopathy   LUNGS: No respiratory compromise or indrawing  ABDOMEN: Soft, non-tender, non-distended. Electronically signed by:  Brian Edgar MD

## 2018-02-01 NOTE — PROGRESS NOTES
Patient dropped off disability paperwork at the [facility] office for Dr. Nomi Reese to complete. Patient would like to  paperwork. Do not fax. Please contact patient when ready.

## 2018-02-01 NOTE — PROGRESS NOTES
1. Have you been to the ER, urgent care clinic since your last visit? Hospitalized since your last visit? No    2. Have you seen or consulted any other health care providers outside of the [address] [place] [address] since your last visit? Include any pap smears or colon screening.  No

## 2018-02-01 NOTE — PATIENT INSTRUCTIONS
Health Maintenance Due   Topic Date Due    Hepatitis C Test  1955    Shingles Vaccine  02/13/2013    Flu Vaccine  08/01/2017

## 2018-02-05 RX ORDER — MONTELUKAST SODIUM 10 MG/1
TABLET ORAL
Qty: 90 TAB | Refills: 0 | Status: SHIPPED | OUTPATIENT
Start: 2018-02-05

## 2018-02-06 ENCOUNTER — OFFICE VISIT (OUTPATIENT)
Dept: ORTHOPEDIC SURGERY | Age: 63
End: 2018-02-06

## 2018-02-06 VITALS
TEMPERATURE: 97.6 F | BODY MASS INDEX: 31.64 KG/M2 | HEART RATE: 75 BPM | RESPIRATION RATE: 16 BRPM | WEIGHT: 221 LBS | HEIGHT: 70 IN | DIASTOLIC BLOOD PRESSURE: 84 MMHG | SYSTOLIC BLOOD PRESSURE: 140 MMHG

## 2018-02-06 DIAGNOSIS — M47.816 LUMBAR SPONDYLOSIS: ICD-10-CM

## 2018-02-06 DIAGNOSIS — Z86.718 PERSONAL HISTORY OF VENOUS THROMBOSIS AND EMBOLISM: ICD-10-CM

## 2018-02-06 DIAGNOSIS — M62.838 MUSCLE SPASM: ICD-10-CM

## 2018-02-06 DIAGNOSIS — Z79.01 WARFARIN ANTICOAGULATION: ICD-10-CM

## 2018-02-06 DIAGNOSIS — M48.061 SPINAL STENOSIS OF LUMBAR REGION WITHOUT NEUROGENIC CLAUDICATION: ICD-10-CM

## 2018-02-06 DIAGNOSIS — M47.816 LUMBAR FACET ARTHROPATHY: ICD-10-CM

## 2018-02-06 DIAGNOSIS — M51.36 DEGENERATION OF LUMBAR INTERVERTEBRAL DISC: Primary | ICD-10-CM

## 2018-02-06 RX ORDER — METAXALONE 800 MG/1
800 TABLET ORAL 3 TIMES DAILY
Qty: 90 TAB | Refills: 2 | Status: SHIPPED | OUTPATIENT
Start: 2018-02-06 | End: 2019-03-25 | Stop reason: SDUPTHER

## 2018-02-06 RX ORDER — METAXALONE 800 MG/1
800 TABLET ORAL 3 TIMES DAILY
Qty: 30 TAB | Refills: 2 | Status: CANCELLED | OUTPATIENT
Start: 2018-02-06

## 2018-02-06 NOTE — MR AVS SNAPSHOT
Iain Marrufo 
 
 
 [address] [facility] [address] 
[phone] Patient: Derrick Araujo MRN: [id number] PIL:[id number] Visit Information Date & Time Provider Department Dept. Phone Encounter #  
 2/6/2018  7:45 AM Anson Conner MD [address] [facility] [phone] Follow-up Instructions Return in about 3 months (around 5/6/2018) for Medication follow up. Routing History Your Appointments 2/7/2018  8:30 AM  
HISTORY AND PHYSICAL with Erin Bethea PA-C  
[facility] - [address]) Appt Note: LEFT TOTAL KNEE ARTHROPLASTY/NEEDS FILMS  
 [address] [facility], [address]  
[phone]  
  
   
 [address] [place], [address] [facility] [address]  
  
    
 3/6/2018  3:30 PM  
Follow Up with Anson Conner MD  
[address] [facility], [address] [facility] [address]) Appt Note: 3 mo f/u  
 [address] [facility] [address]  
[phone]  
  
   
 [address]  
  
    
 3/23/2018 10:45 AM  
Follow Up with Latrice Dickson MD  
[facility] [place] [address]) Appt Note: 5 wk f/u  
 [address] [facility] [facility] [address]  
  
   
 [address] [facility], [address] [facility] [address] Upcoming Health Maintenance Date Due Hepatitis C Screening 1955 ZOSTER VACCINE AGE 60> 2/13/2013 Influenza Age 5 to Adult 8/1/2017 DTaP/Tdap/Td series (2 - Td) 12/6/2024 COLONOSCOPY 5/27/2026 Allergies as of 2/6/2018  Review Complete On: 2/6/2018 By: Ruddy Dodge LPN Severity Noted Reaction Type Reactions Augmentin [Amoxicillin-pot Clavulanate]    Itching Penicillins    Rash Current Immunizations  Reviewed on 1/17/2018 Name Date Tdap 12/6/2014 Not reviewed this visit You Were Diagnosed With   
  
 Codes Comments Spinal stenosis of lumbar region without neurogenic claudication    -  Primary ICD-10-CM: M48.061 
ICD-9-CM: 724.02 Lumbar facet arthropathy     ICD-10-CM: M12.88 ICD-9-CM: 721.3 Muscle spasm     ICD-10-CM: I71.910 ICD-9-CM: 728.85 Warfarin anticoagulation     ICD-10-CM: Z79.01 
ICD-9-CM: V58.61 Vitals BP Pulse Temp Resp Height(growth percentile) Weight(growth percentile) 140/84 75 97.6 °F (36.4 °C) (Oral) 16 5' 10\" (1.778 m) 221 lb (100.2 kg) BMI Smoking Status 31.71 kg/m2 Never Smoker BMI and BSA Data Body Mass Index Body Surface Area 31.71 kg/m 2 2.22 m 2 Preferred Pharmacy Pharmacy Name Phone [facility] "[address] Your Updated Medication List  
  
   
This list is accurate as of: 2/6/18  8:38 AM.  Always use your most recent med list.  
  
  
  
  
 acetaminophen 500 mg tablet Commonly known as:  TYLENOL Take  by mouth every six (6) hours as needed for Pain. butalbital-acetaminophen-caff -40 mg per capsule Commonly known as:  [employer] Take 2 capsules every 8 hours as needed for headache  
  
 enoxaparin 100 mg/mL Commonly known as:  LOVENOX Stop Warfarin 2/6; Inject sub-q as follows; 1 dose thur 2/8  night, 1 dose twice daily Friday,  1 dose Sat morning; resume post op  
  
 labetalol 100 mg tablet Commonly known as:  NORMODYNE  
TAKE ONE TABLET BY MOUTH TWICE DAILY  
  
 lansoprazole 30 mg capsule Commonly known as:  PREVACID TAKE 1 CAPSULE DAILY BEFOREBREAKFAST  
  
 lisinopril-hydroCHLOROthiazide 20-12.5 mg per tablet Commonly known as:  Lauralyn Laming Take 1 Tab by mouth daily. metaxalone 800 mg tablet Commonly known as:  SKELAXIN Take 1 Tab by mouth three (3) times daily. Indications: Muscle Spasm  
  
 montelukast 10 mg tablet Commonly known as:  SINGULAIR  
 TAKE 1 TABLET BY MOUTH EVERY DAY  
  
 nystatin topical cream  
Commonly known as:  MYCOSTATIN Apply  to affected area two (2) times a day. PROBIOTIC 4X 10-15 mg Tbec Generic drug:  B.infantis-B.ani-B.long-B.bifi Take  by mouth. raNITIdine 150 mg tablet Commonly known as:  ZANTAC TK 1 T PO HS  
  
 red yeast rice extract 600 mg Cap Take 600 mg by mouth now.  
  
 warfarin 5 mg tablet Commonly known as:  COUMADIN  
TAKE 2 AND 1/2 TABLETS BY MOUTH DAILY OR AS DIRECTED Prescriptions Sent to Pharmacy Refills  
 metaxalone (SKELAXIN) 800 mg tablet 2 Sig: Take 1 Tab by mouth three (3) times daily. Indications: Muscle Spasm Class: Normal  
 Pharmacy: [facility], [address] [place] [address] #: [phone] Route: Oral  
  
We Performed the Following AMB SUPPLY ORDER [[id number] Custom] Comments:  
 Compression Stockings Follow-up Instructions Return in about 3 months (around 5/6/2018) for Medication follow up. Patient Instructions Low Back Arthritis: Exercises Your Care Instructions Here are some examples of typical rehabilitation exercises for your condition. Start each exercise slowly. Ease off the exercise if you start to have pain. Your doctor or physical therapist will tell you when you can start these exercises and which ones will work best for you. When you are not being active, find a comfortable position for rest. Some people are comfortable on the floor or a medium-firm bed with a small pillow under their head and another under their knees. Some people prefer to lie on their side with a pillow between their knees. Don't stay in one position for too long. Take short walks (10 to 20 minutes) every 2 to 3 hours. Avoid slopes, hills, and stairs until you feel better. Walk only distances you can manage without pain, especially leg pain. How to do the exercises Pelvic tilt 1. Lie on your back with your knees bent. 2. \"Brace\" your stomach-tighten your muscles by pulling in and imagining your belly button moving toward your spine. 3. Press your lower back into the floor. You should feel your hips and pelvis rock back. 4. Hold for 6 seconds while breathing smoothly. 5. Relax and allow your pelvis and hips to rock forward. 6. Repeat 8 to 12 times. Back stretches 1. Get down on your hands and knees on the floor. 2. Relax your head and allow it to droop. Round your back up toward the ceiling until you feel a nice stretch in your upper, middle, and lower back. Hold this stretch for as long as it feels comfortable, or about 15 to 30 seconds. 3. Return to the starting position with a flat back while you are on your hands and knees. 4. Let your back sway by pressing your stomach toward the floor. Lift your buttocks toward the ceiling. 5. Hold this position for 15 to 30 seconds. 6. Repeat 2 to 4 times. Follow-up care is a key part of your treatment and safety. Be sure to make and go to all appointments, and call your doctor if you are having problems. It's also a good idea to know your test results and keep a list of the medicines you take. Where can you learn more? Go to http://rivka-jarrell.info/. Enter M065 in the search box to learn more about \"Low Back Arthritis: Exercises. \" Current as of: March 21, 2017 Content Version: 11.4 © 9302-3312 Healthwise, [employer]. Care instructions adapted under license by [employer] (which disclaims liability or warranty for this information). If you have questions about a medical condition or this instruction, always ask your healthcare professional. [address] any warranty or liability for your use of this information. Introducing \[facility] [place] [facility]\"" & HEALTH SERVICES!    
 Dana Castro introduces An[employer]ot patient portal. Now you can access parts of your medical record, email your doctor's office, and request medication refills online. 1. In your internet browser, go to [email or web address] 2. Click on the First Time User? Click Here link in the Sign In box. You will see the New Member Sign Up page. 3. Enter your FTBpro Access Code exactly as it appears below. You will not need to use this code after youve completed the sign-up process. If you do not sign up before the expiration date, you must request a new code. · FTBpro Access Code: [email or web address] Expires: 2/13/2018 12:24 PM 
 
4. Enter the last four digits of your Social Security Number (xxxx) and Date of Birth (mm/dd/yyyy) as indicated and click Submit. You will be taken to the next sign-up page. 5. Create a FTBpro ID. This will be your FTBpro login ID and cannot be changed, so think of one that is secure and easy to remember. 6. Create a FTBpro password. You can change your password at any time. 7. Enter your Password Reset Question and Answer. This can be used at a later time if you forget your password. 8. Enter your e-mail address. You will receive e-mail notification when new information is available in [address] 9. Click Sign Up. You can now view and download portions of your medical record. 10. Click the Download Summary menu link to download a portable copy of your medical information. If you have questions, please visit the Frequently Asked Questions section of the FTBpro website. Remember, FTBpro is NOT to be used for urgent needs. For medical emergencies, dial 911. Now available from your iPhone and Android! Please provide this summary of care documentation to your next provider. Your primary care clinician is listed as Malathi Wells. If you have any questions after today's visit, please call [phone].

## 2018-02-06 NOTE — PATIENT INSTRUCTIONS
Low Back Arthritis: Exercises  Your Care Instructions  Here are some examples of typical rehabilitation exercises for your condition. Start each exercise slowly. Ease off the exercise if you start to have pain. Your doctor or physical therapist will tell you when you can start these exercises and which ones will work best for you. When you are not being active, find a comfortable position for rest. Some people are comfortable on the floor or a medium-firm bed with a small pillow under their head and another under their knees. Some people prefer to lie on their side with a pillow between their knees. Don't stay in one position for too long. Take short walks (10 to 20 minutes) every 2 to 3 hours. Avoid slopes, hills, and stairs until you feel better. Walk only distances you can manage without pain, especially leg pain. How to do the exercises  Pelvic tilt    1. Lie on your back with your knees bent. 2. \"Brace\" your stomach-tighten your muscles by pulling in and imagining your belly button moving toward your spine. 3. Press your lower back into the floor. You should feel your hips and pelvis rock back. 4. Hold for 6 seconds while breathing smoothly. 5. Relax and allow your pelvis and hips to rock forward. 6. Repeat 8 to 12 times. Back stretches    1. Get down on your hands and knees on the floor. 2. Relax your head and allow it to droop. Round your back up toward the ceiling until you feel a nice stretch in your upper, middle, and lower back. Hold this stretch for as long as it feels comfortable, or about 15 to 30 seconds. 3. Return to the starting position with a flat back while you are on your hands and knees. 4. Let your back sway by pressing your stomach toward the floor. Lift your buttocks toward the ceiling. 5. Hold this position for 15 to 30 seconds. 6. Repeat 2 to 4 times. Follow-up care is a key part of your treatment and safety.  Be sure to make and go to all appointments, and call your doctor if you are having problems. It's also a good idea to know your test results and keep a list of the medicines you take. Where can you learn more? Go to http://rivka-jarrell.info/. Enter Q358 in the search box to learn more about \"Low Back Arthritis: Exercises. \"  Current as of: March 21, 2017  Content Version: 11.4  © 8537-0697 [employer]. Care instructions adapted under license by [employer] (which disclaims liability or warranty for this information). If you have questions about a medical condition or this instruction, always ask your healthcare professional. [place] [address] any warranty or liability for your use of this information.

## 2018-02-06 NOTE — PROGRESS NOTES
[facility]  [address], [facility] [address]  Phone: [phone]  Fax: [phone]      ASSESSMENT   Diagnoses and all orders for this visit:    1. Spinal stenosis of lumbar region without neurogenic claudication  -     Generic Supply Order    2. Lumbar facet arthropathy    3. Muscle spasm  -     metaxalone (SKELAXIN) 800 mg tablet; Take 1 Tab by mouth three (3) times daily. Indications: Muscle Spasm    4. Warfarin anticoagulation  -     Generic Supply Order    5. Personal history of venous thrombosis and embolism  -     Generic Supply Order         IMPRESSION AND PLAN:  Tracey Franco is a 57 y.o. male with history of lumbar and bilateral knee pain. Pt complains of dull pain in the lower back that generally improves as the day progresses. He also has knee pain and will have a left knee replacement on 02/12/2018.    1) Pt was given information on lumbar arthritis exercises. 2) He received a refill of Skelaxin 800 mg 1 tab TID prn muscle spasm. 3) Pt will have a left knee replacement on 02/12/2018 with Dr. Zelda Flores. 4) He is not a candidate for NSAID's due to anticoagulation with Coumadin. 5) Mr. Anita Aolnzo has a reminder for a \"due or due soon\" health maintenance. I have asked that he contact his primary care provider, Lucy Perez MD, for follow-up on this health maintenance. 6)  demonstrated consistency with prescribing. 7) Pt will follow-up in 3 months or sooner if needed. 8) Will give a prescription for compression stockings. HISTORY OF PRESENT ILLNESS:  Tracey Franco is a 57 y.o. male with history of lumbar and bilateral knee pain. He presents to the office today for lumbar MRI follow up. Pt complains of dull pain across the lower back that generally improves as the day progresses. He states that he no longer experiences pain in the left leg extending from the knee to the ankle.  Pt is followed by Dr. Zelda Flores and will have a left knee replacement on Monday, 02/12/2018. Of note, he had two prior lumbar surgeries. He admits to significant relief when taking Skelaxin 800 mg 1 tab QHS and requests a refill at this time. Pt desires to continue with current care. Pain Scale: 3/10    PCP: Rodrigo Beach MD       Past Medical History:   Diagnosis Date    Arthritis     Bleeding     Blood clot in the legs     Esophageal reflux     GERD (gastroesophageal reflux disease)     Headache(784.0)     migraine    High cholesterol     Hypertension     Lower back pain 11/6/2010    Other chest pain     Personal history of venous thrombosis and embolism     Pure hypercholesterolemia     Right buttock pain 11/6/2010    Right foot pain     Spinal stenosis     Tendonitis, tibialis     anterior    Thromboembolus ([address]     3 after sx         Social History     Social History    Marital status:      Spouse name: N/A    Number of children: N/A    Years of education: N/A     Occupational History    Not on file. Social History Main Topics    Smoking status: Never Smoker    Smokeless tobacco: Never Used    Alcohol use No    Drug use: No    Sexual activity: Not Currently     Other Topics Concern    Not on file     Social History Narrative       Current Outpatient Prescriptions   Medication Sig Dispense Refill    metaxalone (SKELAXIN) 800 mg tablet Take 1 Tab by mouth three (3) times daily. Indications: Muscle Spasm 90 Tab 2    montelukast (SINGULAIR) 10 mg tablet TAKE 1 TABLET BY MOUTH EVERY DAY 90 Tab 0    butalbital-acetaminophen-caff (FIORICET) -40 mg per capsule Take 2 capsules every 8 hours as needed for headache 540 Cap 3    warfarin (COUMADIN) 5 mg tablet TAKE 2 AND 1/2 TABLETS BY MOUTH DAILY OR AS DIRECTED 75 Tab 0    nystatin (MYCOSTATIN) topical cream Apply  to affected area two (2) times a day. 15 g 0    lisinopril-hydroCHLOROthiazide (PRINZIDE, ZESTORETIC) 20-12.5 mg per tablet Take 1 Tab by mouth daily. 90 Tab 1    B.infantis-B.ani-B.long-B.bifi (PROBIOTIC 4X) 10-15 mg TbEC Take  by mouth.  red yeast rice extract 600 mg cap Take 600 mg by mouth now.  labetalol (NORMODYNE) 100 mg tablet TAKE ONE TABLET BY MOUTH TWICE DAILY 180 Tab 0    lansoprazole (PREVACID) 30 mg capsule TAKE 1 CAPSULE DAILY BEFOREBREAKFAST 90 Cap 3    raNITIdine (ZANTAC) 150 mg tablet TK 1 T PO HS  5    acetaminophen (TYLENOL) 500 mg tablet Take  by mouth every six (6) hours as needed for Pain.  enoxaparin (LOVENOX) 100 mg/mL Stop Warfarin 2/6; Inject sub-q as follows; 1 dose thur 2/8  night, 1 dose twice daily Friday,  1 dose Sat morning; resume post op 12 Syringe 0       Allergies   Allergen Reactions    Augmentin [Amoxicillin-Pot Clavulanate] Itching    Penicillins Rash         REVIEW OF SYSTEMS    Constitutional: Negative for fever, chills, or weight change. Respiratory: Negative for cough or shortness of breath. Cardiovascular: Negative for chest pain or palpitations. Gastrointestinal: Negative for acid reflux, change in bowel habits, or constipation. Genitourinary: Negative for dysuria and flank pain. Musculoskeletal: Positive for lumbar and left knee pain. Skin: Negative for rash. Neurological: Negative for headaches, dizziness, or numbness. Endo/Heme/Allergies: Negative for increased bruising. Psychiatric/Behavioral: Negative for difficulty with sleep. PHYSICAL EXAMINATION  Visit Vitals    /84    Pulse 75    Temp 97.6 °F (36.4 °C) (Oral)    Resp 16    Ht 5' 10\" (1.778 m)    Wt 221 lb (100.2 kg)    BMI 31.71 kg/m2       Constitutional: Awake, alert, and in no acute distress. Neurological: 1+ symmetrical DTRs in the upper extremities. 1+ symmetrical DTRs in the lower extremities. Sensation to light touch is intact. Negative Eliecer's sign bilaterally. Skin: warm, dry, and intact. Musculoskeletal: Tenderness to palpation in the lower lumbar region.  Moderate pain with extension and axial loading. No pain with internal or external rotation of his hips. Negative straight leg raise bilaterally. Patient ambulates with the assistance of a single point cane. Hip Flex  Quads Hamstrings Ankle DF EHL Ankle PF   Right +4/5 +4/5 +4/5 +4/5 +4/5 +4/5   Left  4/5  4/5  4/5 +4/5 +4/5 +4/5     IMAGING:    Lumbar spine MRI from 01/24/2018 was personally reviewed with the patient and demonstrated:    Results from [place] encounter on 01/24/18   MRI LUMB SPINE W WO CONT   Narrative MR lumbar spine with and without contrast    CPT CODE: 43054    HISTORY: Chronic and worsening low back pain with left lower extremity pain. Increasing weakness. Remote history of surgeries. No recent injury. COMPARISON: MRI January 2013. TECHNIQUE: Lumbar spine scanned with axial and sagittal T1W scans, axial and  sagittal T2W scans, and with post gadolinium axial and sagittal T1W scans. Contrast used: 10 cc Gadavist.    FINDINGS:     Prior laminotomies on the left at L4-5 and bilaterally at L5-S1. No fracture,  bone destruction, or fluid collection. Intact lordosis. Normal vertebral body heights. Small less than 5 mm low signal  focus within anterior L4, developed since 2013. No similar focus elsewhere. No  aggressive features. Otherwise generally fatty marrow signal with some chronic  fatty endplate changes straddling L5-S1. Moderate to severe disc space narrowing  and disc desiccation of that level. Mild to moderate narrowing and desiccation  of L3-4 and L4-5. Conus at T12-L1. Axial imaging correlation:    T12-L1:  Patent canal and foramina. L1-L2: Patent canal and foramina. L2-L3: Patent canal and foramina. L3-L4: Broad-based disc osteophyte complex. Bilateral facet arthropathy with  ligamentum flavum thickening and buckling. Moderate concentric spinal stenosis. Particular narrowing of lateral recesses with transient distortion of the  crossing L4 nerves. Axial T2 image 20.  Patent foramina. Progression of  degenerative findings. L4-L5: Postoperative level. Mild broad-based disc osteophyte complex with a  small amount of enhancing scar tissue. Hypertrophic facet arthropathy. Moderate  spinal stenosis. Narrowing of the lateral recesses left more than right. Transient distortion of the crossing nerves particularly left L5. Axial T2 image  13. Mild foraminal stenoses. Unchanged. L5-S1: Postoperative level. Broad-based disc osteophyte complex with enhancing  scar tissue centrally. Hypertrophic facet arthropathy. No significant spinal  stenosis. Moderately severe bilateral foraminal stenoses. Unchanged. Incidental imaging of regional soft tissues unremarkable. Impression IMPRESSION:    1. Some progression of degenerative disc and facet disease at L3-4, with  moderate spinal stenosis and potentially impingement of the crossing L4 nerves,  left more than right. 2. Stable postsurgical and degenerative findings at L4-5 and L5-S1. Left tib/fib x-rays from 01/19/2018 demonstrated:     Results from Hospital Encounter on 01/19/18   XR TIB/FIB LT    Narrative LEFT TIBIA/FIBULA, TWO VIEWS    CPT CODE: 39379    INDICATION: Left lower leg pain x1 week, no known trauma    COMPARISON: None    TECHNIQUE: AP and lateral radiographs of the left tibia and fibula are reviewed.  ]     FINDINGS:    There is no evidence of acute osseous injury. No radiographic evidence of  significant localized soft tissue swelling is seen. Mild degenerative changes at the knee with mild joint space narrowing best seen  at the medial compartment and very small periarticular osteophytosis best seen  along the posterior superior patella. Chondrocalcinosis at the knee, which is not specific but often seen in the  setting of CPPD.     Tiny calcific appearing densities project  inferior to the patella on lateral  view, overlying the soft tissues medial to the distal tibia on AP image, and  anterior to the proximal to mid tibia on lateral projection, nonspecific.           Impression Impression:     No evidence of acute fracture. Chondrocalcinosis. Mild degenerative changes at the left knee. Few small scattered soft tissue calcifications noted.              Bilateral knee x-rays from 03/14/2017 demonstrated:  Medial compartment narrowing bilaterally, L>R      Cervical Spine MRI from 12/19/2016 demonstrated:  Results from Hospital Encounter on 12/19/16   MRI CERV SPINE WO CONT     Narrative MRI of cervical spine without contrast    HISTORY: Chronic progressive neck pain with headaches. COMPARISON: No prior MRI. Cervical spine radiographs from 12/1/2016. TECHNIQUE: T1 weighted, T2 FSE, FSE inversion recovery sagittal images are  supplemented by T2 weighted and GRE/medic axial images. FINDINGS: Normal alignment and vertebral body heights. Normal marrow signal  except for mild endplate degenerative change. Cervical cord is normal in signal  and caliber. Visualized posterior fossa contents look normal. Paraspinal soft  tissues look normal.    C2-3: No significant degenerative disc disease or spinal stenosis. C3-4: Small nonfocal disc protrusion which contacts the ventral cord and causes  borderline spinal stenosis. No foraminal stenosis. C4-5: No significant degenerative disc disease. Mildly hypertrophic facets. No  spinal stenosis. C5-6: Disc is narrowed with diffusely bulging disc and osteophyte complex. Facets are mildly hypertrophic. Mild spinal canal and moderate left foraminal  stenoses. C6-7: Disc is narrowed with diffusely bulging disc and osteophyte complex. Facets are mildly hypertrophic. Mild spinal canal and moderate bilateral  foraminal stenoses. C7-T1: No significant degenerative disc disease or spinal stenosis.               Impression Impression:    Disc osteophyte complexes causing mild spinal canal stenoses at C5-6 and C6-7.     Moderate left foraminal stenosis at C5-6 and moderate bilateral foraminal  stenoses at C6-7.        Written by Claudell Backer, as dictated by Darin Juan MD.  I, Dr. Darin Juan confirm that all documentation is accurate.

## 2018-02-07 ENCOUNTER — OFFICE VISIT (OUTPATIENT)
Dept: ORTHOPEDIC SURGERY | Facility: CLINIC | Age: 63
End: 2018-02-07

## 2018-02-07 VITALS
WEIGHT: 221 LBS | SYSTOLIC BLOOD PRESSURE: 119 MMHG | RESPIRATION RATE: 14 BRPM | OXYGEN SATURATION: 99 % | BODY MASS INDEX: 31.64 KG/M2 | HEART RATE: 69 BPM | DIASTOLIC BLOOD PRESSURE: 67 MMHG | HEIGHT: 70 IN

## 2018-02-07 DIAGNOSIS — M25.562 LEFT KNEE PAIN, UNSPECIFIED CHRONICITY: Primary | ICD-10-CM

## 2018-02-07 DIAGNOSIS — M17.12 PRIMARY OSTEOARTHRITIS OF LEFT KNEE: ICD-10-CM

## 2018-02-07 RX ORDER — ACETAMINOPHEN 325 MG/1
1000 TABLET ORAL ONCE
Status: CANCELLED | OUTPATIENT
Start: 2018-02-07 | End: 2018-02-07

## 2018-02-07 RX ORDER — PREGABALIN 25 MG/1
75 CAPSULE ORAL ONCE
Status: CANCELLED | OUTPATIENT
Start: 2018-02-07 | End: 2018-02-07

## 2018-02-07 RX ORDER — CELECOXIB 100 MG/1
200 CAPSULE ORAL ONCE
Status: CANCELLED | OUTPATIENT
Start: 2018-02-07 | End: 2018-02-07

## 2018-02-07 RX ORDER — ENOXAPARIN SODIUM 100 MG/ML
40 INJECTION SUBCUTANEOUS DAILY
Qty: 5 SYRINGE | Refills: 0 | Status: SHIPPED | OUTPATIENT
Start: 2018-02-07 | End: 2018-02-21

## 2018-02-07 RX ORDER — WARFARIN 1 MG/1
10 TABLET ORAL ONCE
Status: CANCELLED | OUTPATIENT
Start: 2018-02-07 | End: 2018-02-07

## 2018-02-07 NOTE — H&P
[address], [address] [facility]  [phone]           HISTORY & PHYSICAL      Patient: Hildreth Lesches                MRN: [id number]       SSN: [id number]  YOB: 1955        AGE: 57 y.o. SEX: male  Body mass index is 31.71 kg/(m^2). PCP: Shima Toure MD  02/07/18      CC: left knee end stage OA  Problem List Items Addressed This Visit        Other    Primary osteoarthritis of left knee      Other Visit Diagnoses     Left knee pain, unspecified chronicity    -  Primary    Relevant Orders    AMB POC XRAY, KNEE; COMPLETE, 4+ VIEW (Completed)            HPI:  The patient is a pleasant 57 y.o. whom has end stage OA of their Left knee and has failed conservative treatment including but not limited to NSAIDS, cortisone injections, viscosupplementation, PT, and pain medicine. Due to the current findings and affected activities of daily living, surgical intervention is indicated. The alternatives, risks, complications, as well as expected outcome were discussed. These include but are not limited to infection, blood loss, need for blood transfusion, neurovascular damage, DVT, PE,  post-op stiffness and pain, leg length discrepancy, dislocation, anesthetic complications, prothesis longevity, need for more surgery, MI, stroke, and even death. The patient understands and wishes to proceed with surgery.       Past Medical History:   Diagnosis Date    Arthritis     Bleeding     Blood clot in the legs     Esophageal reflux     GERD (gastroesophageal reflux disease)     Headache(784.0)     migraine    High cholesterol     Hypertension     Lower back pain 11/6/2010    Other chest pain     Personal history of venous thrombosis and embolism     Pure hypercholesterolemia     Right buttock pain 11/6/2010    Right foot pain     Spinal stenosis     Tendonitis, tibialis     anterior    Thromboembolus ([facility] [address]     3 after sx Current Outpatient Prescriptions:     enoxaparin (LOVENOX) 40 mg/0.4 mL, 0.4 mL by SubCUTAneous route daily. Last dose Sunday morning (2/11/18), Disp: 5 Syringe, Rfl: 0    metaxalone (SKELAXIN) 800 mg tablet, Take 1 Tab by mouth three (3) times daily. Indications: Muscle Spasm, Disp: 90 Tab, Rfl: 2    montelukast (SINGULAIR) 10 mg tablet, TAKE 1 TABLET BY MOUTH EVERY DAY, Disp: 90 Tab, Rfl: 0    enoxaparin (LOVENOX) 100 mg/mL, Stop Warfarin 2/6; Inject sub-q as follows; 1 dose thur 2/8  night, 1 dose twice daily Friday,  1 dose Sat morning; resume post op, Disp: 12 Syringe, Rfl: 0    butalbital-acetaminophen-caff (FIORICET) -40 mg per capsule, Take 2 capsules every 8 hours as needed for headache, Disp: 540 Cap, Rfl: 3    warfarin (COUMADIN) 5 mg tablet, TAKE 2 AND 1/2 TABLETS BY MOUTH DAILY OR AS DIRECTED, Disp: 75 Tab, Rfl: 0    nystatin (MYCOSTATIN) topical cream, Apply  to affected area two (2) times a day., Disp: 15 g, Rfl: 0    lisinopril-hydroCHLOROthiazide (PRINZIDE, ZESTORETIC) 20-12.5 mg per tablet, Take 1 Tab by mouth daily. , Disp: 90 Tab, Rfl: 1    B.infantis-B.ani-B.long-B.bifi (PROBIOTIC 4X) 10-15 mg TbEC, Take  by mouth., Disp: , Rfl:     red yeast rice extract 600 mg cap, Take 600 mg by mouth now., Disp: , Rfl:     labetalol (NORMODYNE) 100 mg tablet, TAKE ONE TABLET BY MOUTH TWICE DAILY, Disp: 180 Tab, Rfl: 0    lansoprazole (PREVACID) 30 mg capsule, TAKE 1 CAPSULE DAILY BEFOREBREAKFAST, Disp: 90 Cap, Rfl: 3    raNITIdine (ZANTAC) 150 mg tablet, TK 1 T PO HS, Disp: , Rfl: 5    acetaminophen (TYLENOL) 500 mg tablet, Take  by mouth every six (6) hours as needed for Pain., Disp: , Rfl:     Allergies   Allergen Reactions    Augmentin [Amoxicillin-Pot Clavulanate] Itching    Penicillins Rash       Social History     Social History    Marital status:      Spouse name: N/A    Number of children: N/A    Years of education: N/A     Occupational History    Not on file. Social History Main Topics    Smoking status: Never Smoker    Smokeless tobacco: Never Used    Alcohol use No    Drug use: No    Sexual activity: Not Currently     Other Topics Concern    Not on file     Social History Narrative       Past Surgical History:   Procedure Laterality Date    FOOT/TOES SURGERY PROC UNLISTED      HX BACK SURGERY      HX ORTHOPAEDIC  06-25-12    Right foot with excision of bursa and adipose tissue from fifth metatarsal base by Dr. Karleen Kehr      lower back (1992 and 2000)    HX OTHER SURGICAL      left foot (2008)    LAMINOTOMY      NERVE BLOCK         Family History:  Non-contributory. PE:  Visit Vitals    /67 (BP 1 Location: Left arm, BP Patient Position: Sitting)    Pulse 69    Resp 14    Ht 5' 10\" (1.778 m)    Wt 221 lb (100.2 kg)    SpO2 99%    BMI 31.71 kg/m2     A&O X3, NAD, well develop, well nourished  Heart: S1-S2, rrr  Lungs: CTA bilat  Abd: soft, nt, nt, + bs in all quadrants  Ext:  Pos distal pulses to DP, PT      X-ray: left knee shows end stage OA    Labs: labs were reviewed and wnl.  ua neg    A:  Left  knee end stage OA    P:  At this point we will move forward with surgery. Again, the alternatives, risks, complications, as well as expected outcome were discussed and the patient wishes to proceed with surgery. Pt has been instructed to stop aspirin, nsaids, rheumatologic medications and blood thinners. They have also been instructed to continue on any heart and bp meds and to take them the morning of surgery with sips of water. The patient has been bridged with lovenox 40mg daily. The patient will require in patient admission due to above stated medical conditions as well the the surgical challenges given the anatomy and bone quality.          Salvador Duval

## 2018-02-07 NOTE — PATIENT INSTRUCTIONS
Patient: Liz Huynh                MRN: [id number]       SSN: [id number]  YOB: 1955        AGE: 57 y.o. SEX: male  There is no height or weight on file to calculate BMI.    02/07/18    DO:  1:  Sit with the leg out straight 5-10 min every hour while awake. Keep the toes pointed       up towards the celia. 2.  Bend your knee 5-10 min every hour. Bend it to the point of pain and hold it for 5-10       Min. 3.  ICE your knee 20 min every hour    DO NOT:    1. Do not place anything under your knee to prop it up  2. Do not sit in the recliner chair.

## 2018-02-08 ENCOUNTER — TELEPHONE (OUTPATIENT)
Dept: ORTHOPEDIC SURGERY | Facility: CLINIC | Age: 63
End: 2018-02-08

## 2018-02-08 RX ORDER — AZITHROMYCIN 250 MG/1
TABLET, FILM COATED ORAL
Qty: 6 TAB | Refills: 0 | Status: SHIPPED | OUTPATIENT
Start: 2018-02-08 | End: 2018-02-13

## 2018-02-08 NOTE — TELEPHONE ENCOUNTER
Mrs. Dell Chaidez called to say her  is scheduled for SX on 2/12/18 with Dr. Lincoln Matthew. Today he woke up with a slight sore throat and small cough. No fever. Patient started his Lovenox last night. Patient is worried his SX will be cancelled. Please call Mrs. Dell Chaidez at [phone] and advise.

## 2018-02-08 NOTE — TELEPHONE ENCOUNTER
Contact pcp if worsening    If still persisting/worsening cough, sore throat through tomorrow may need to postpone surgery. Please contact us tomorrow afternoon to update us on how he is feeling.

## 2018-02-08 NOTE — TELEPHONE ENCOUNTER
Spoke with patients wife, she was instructed to contact PCP in regards to Mr. Yael Leos not feeling well. She will contact Kimo after discussing and or visiting with the PCP.

## 2018-02-08 NOTE — TELEPHONE ENCOUNTER
Called patient to let him know that, per Dr. Nick Powers, he is sending in Z-pac into his pharmacy and to take Claritin OTC and use a cool mist humidifier. Patient verbalized understanding.

## 2018-02-09 ENCOUNTER — TELEPHONE (OUTPATIENT)
Dept: ORTHOPEDIC SURGERY | Age: 63
End: 2018-02-09

## 2018-02-09 NOTE — TELEPHONE ENCOUNTER
Contacted pt at his home phone. Informed pt that Ayleen Sidhu stated that it would be ok to have surgery as long as he doesn't run a fever, develop a sore throat or the cough worsens. Pt states that if he does start having any changes in the symptoms then he will call our office.

## 2018-02-09 NOTE — TELEPHONE ENCOUNTER
Sounds OK for surgery on Monday as long as he continue to feel good. If any fevers, worsening cough, sore throat- call to postpone surgery.

## 2018-02-09 NOTE — TELEPHONE ENCOUNTER
Patient called to give update on how he feels. .. Shanae Castro no fever, no sore throat today, not a constant cough. PCP Dr. Rudy Chase called in a Z-PK which patient started yesterday. Patient also states he is taking Mucinex. Please advise on his SX for Monday 2/12/18.   He may be reached at [phone]

## 2018-02-12 ENCOUNTER — OFFICE VISIT (OUTPATIENT)
Dept: INTERNAL MEDICINE CLINIC | Age: 63
End: 2018-02-12

## 2018-02-12 VITALS
OXYGEN SATURATION: 96 % | RESPIRATION RATE: 16 BRPM | WEIGHT: 221 LBS | DIASTOLIC BLOOD PRESSURE: 80 MMHG | SYSTOLIC BLOOD PRESSURE: 138 MMHG | TEMPERATURE: 98.5 F | HEART RATE: 78 BPM | BODY MASS INDEX: 31.64 KG/M2 | HEIGHT: 70 IN

## 2018-02-12 DIAGNOSIS — R05.9 COUGH: Primary | ICD-10-CM

## 2018-02-12 DIAGNOSIS — I10 ESSENTIAL HYPERTENSION: ICD-10-CM

## 2018-02-12 RX ORDER — CODEINE PHOSPHATE AND GUAIFENESIN 10; 100 MG/5ML; MG/5ML
SOLUTION ORAL
Qty: 120 ML | Refills: 1 | Status: SHIPPED | OUTPATIENT
Start: 2018-02-12 | End: 2018-02-19 | Stop reason: SDUPTHER

## 2018-02-12 NOTE — PATIENT INSTRUCTIONS
DASH Diet: Care Instructions  Your Care Instructions    The DASH diet is an eating plan that can help lower your blood pressure. DASH stands for Dietary Approaches to Stop Hypertension. Hypertension is high blood pressure. The DASH diet focuses on eating foods that are high in calcium, potassium, and magnesium. These nutrients can lower blood pressure. The foods that are highest in these nutrients are fruits, vegetables, low-fat dairy products, nuts, seeds, and legumes. But taking calcium, potassium, and magnesium supplements instead of eating foods that are high in those nutrients does not have the same effect. The DASH diet also includes whole grains, fish, and poultry. The DASH diet is one of several lifestyle changes your doctor may recommend to lower your high blood pressure. Your doctor may also want you to decrease the amount of sodium in your diet. Lowering sodium while following the DASH diet can lower blood pressure even further than just the DASH diet alone. Follow-up care is a key part of your treatment and safety. Be sure to make and go to all appointments, and call your doctor if you are having problems. It's also a good idea to know your test results and keep a list of the medicines you take. How can you care for yourself at home? Following the DASH diet  · Eat 4 to 5 servings of fruit each day. A serving is 1 medium-sized piece of fruit, ½ cup chopped or canned fruit, 1/4 cup dried fruit, or 4 ounces (½ cup) of fruit juice. Choose fruit more often than fruit juice. · Eat 4 to 5 servings of vegetables each day. A serving is 1 cup of lettuce or raw leafy vegetables, ½ cup of chopped or cooked vegetables, or 4 ounces (½ cup) of vegetable juice. Choose vegetables more often than vegetable juice. · Get 2 to 3 servings of low-fat and fat-free dairy each day. A serving is 8 ounces of milk, 1 cup of yogurt, or 1 ½ ounces of cheese. · Eat 6 to 8 servings of grains each day.  A serving is 1 slice of bread, 1 ounce of dry cereal, or ½ cup of cooked rice, pasta, or cooked cereal. Try to choose whole-grain products as much as possible. · Limit lean meat, poultry, and fish to 2 servings each day. A serving is 3 ounces, about the size of a deck of cards. · Eat 4 to 5 servings of nuts, seeds, and legumes (cooked dried beans, lentils, and split peas) each week. A serving is 1/3 cup of nuts, 2 tablespoons of seeds, or ½ cup of cooked beans or peas. · Limit fats and oils to 2 to 3 servings each day. A serving is 1 teaspoon of vegetable oil or 2 tablespoons of salad dressing. · Limit sweets and added sugars to 5 servings or less a week. A serving is 1 tablespoon jelly or jam, ½ cup sorbet, or 1 cup of lemonade. · Eat less than 2,300 milligrams (mg) of sodium a day. If you limit your sodium to 1,500 mg a day, you can lower your blood pressure even more. Tips for success  · Start small. Do not try to make dramatic changes to your diet all at once. You might feel that you are missing out on your favorite foods and then be more likely to not follow the plan. Make small changes, and stick with them. Once those changes become habit, add a few more changes. · Try some of the following:  ¨ Make it a goal to eat a fruit or vegetable at every meal and at snacks. This will make it easy to get the recommended amount of fruits and vegetables each day. ¨ Try yogurt topped with fruit and nuts for a snack or healthy dessert. ¨ Add lettuce, tomato, cucumber, and onion to sandwiches. ¨ Combine a ready-made pizza crust with low-fat mozzarella cheese and lots of vegetable toppings. Try using tomatoes, squash, spinach, broccoli, carrots, cauliflower, and onions. ¨ Have a variety of cut-up vegetables with a low-fat dip as an appetizer instead of chips and dip. ¨ Sprinkle sunflower seeds or chopped almonds over salads. Or try adding chopped walnuts or almonds to cooked vegetables.   ¨ Try some vegetarian meals using beans and peas. Add garbanzo or kidney beans to salads. Make burritos and tacos with mashed willett beans or black beans. Where can you learn more? Go to http://rivka-jarrell.info/. Enter Q537 in the search box to learn more about \"DASH Diet: Care Instructions. \"  Current as of: September 21, 2016  Content Version: 11.4  © 5982-2185 [employer]. Care instructions adapted under license by [employer] (which disclaims liability or warranty for this information). If you have questions about a medical condition or this instruction, always ask your healthcare professional. [address] any warranty or liability for your use of this information. Body Mass Index: Care Instructions  Your Care Instructions    Body mass index (BMI) can help you see if your weight is raising your risk for health problems. It uses a formula to compare how much you weigh with how tall you are. · A BMI lower than 18.5 is considered underweight. · A BMI between 18.5 and 24.9 is considered healthy. · A BMI between 25 and 29.9 is considered overweight. A BMI of 30 or higher is considered obese. If your BMI is in the normal range, it means that you have a lower risk for weight-related health problems. If your BMI is in the overweight or obese range, you may be at increased risk for weight-related health problems, such as high blood pressure, heart disease, stroke, arthritis or joint pain, and diabetes. If your BMI is in the underweight range, you may be at increased risk for health problems such as fatigue, lower protection (immunity) against illness, muscle loss, bone loss, hair loss, and hormone problems. BMI is just one measure of your risk for weight-related health problems. You may be at higher risk for health problems if you are not active, you eat an unhealthy diet, or you drink too much alcohol or use tobacco products.   Follow-up care is a key part of your treatment and safety. Be sure to make and go to all appointments, and call your doctor if you are having problems. It's also a good idea to know your test results and keep a list of the medicines you take. How can you care for yourself at home? · Practice healthy eating habits. This includes eating plenty of fruits, vegetables, whole grains, lean protein, and low-fat dairy. · If your doctor recommends it, get more exercise. Walking is a good choice. Bit by bit, increase the amount you walk every day. Try for at least 30 minutes on most days of the week. · Do not smoke. Smoking can increase your risk for health problems. If you need help quitting, talk to your doctor about stop-smoking programs and medicines. These can increase your chances of quitting for good. · Limit alcohol to 2 drinks a day for men and 1 drink a day for women. Too much alcohol can cause health problems. If you have a BMI higher than 25  · Your doctor may do other tests to check your risk for weight-related health problems. This may include measuring the distance around your waist. A waist measurement of more than 40 inches in men or 35 inches in women can increase the risk of weight-related health problems. · Talk with your doctor about steps you can take to stay healthy or improve your health. You may need to make lifestyle changes to lose weight and stay healthy, such as changing your diet and getting regular exercise. If you have a BMI lower than 18.5  · Your doctor may do other tests to check your risk for health problems. · Talk with your doctor about steps you can take to stay healthy or improve your health. You may need to make lifestyle changes to gain or maintain weight and stay healthy, such as getting more healthy foods in your diet and doing exercises to build muscle. Where can you learn more? Go to http://rivka-jarrell.info/.   Enter S176 in the search box to learn more about \"Body Mass Index: Care Instructions. \"  Current as of: October 13, 2016  Content Version: 11.4  © 0056-2535 [employer]wise, [facility]. Care instructions adapted under license by [employer] (which disclaims liability or warranty for this information). If you have questions about a medical condition or this instruction, always ask your healthcare professional. [place] [address] any warranty or liability for your use of this information.

## 2018-02-12 NOTE — PROGRESS NOTES
HPI:   Given azithromycin 2/8/18 for at outset of URI sxs; NP cough, head congestion, sore throat w/o fever, SOB/CP have not improved  Hx notable for HTN      ROS is otherwise negative. Past Medical History:   Diagnosis Date    Arthritis     Bleeding     Blood clot in the legs     Esophageal reflux     GERD (gastroesophageal reflux disease)     Headache(784.0)     migraine    High cholesterol     Hypertension     Lower back pain 11/6/2010    Other chest pain     Personal history of venous thrombosis and embolism     Pure hypercholesterolemia     Right buttock pain 11/6/2010    Right foot pain     Spinal stenosis     Tendonitis, tibialis     anterior    Thromboembolus ([facility] [address]     3 after sx        Past Surgical History:   Procedure Laterality Date    FOOT/TOES SURGERY PROC UNLISTED      HX BACK SURGERY      HX ORTHOPAEDIC  06-25-12    Right foot with excision of bursa and adipose tissue from fifth metatarsal base by Dr. Alan Salinas      lower back (1992 and 2000)    HX OTHER SURGICAL      left foot (2008)    LAMINOTOMY      NERVE BLOCK         Social History     Social History    Marital status:      Spouse name: N/A    Number of children: N/A    Years of education: N/A     Occupational History    Not on file.      Social History Main Topics    Smoking status: Never Smoker    Smokeless tobacco: Never Used    Alcohol use No    Drug use: No    Sexual activity: Not Currently     Other Topics Concern    Not on file     Social History Narrative       Allergies   Allergen Reactions    Augmentin [Amoxicillin-Pot Clavulanate] Itching    Penicillins Rash       Family History   Problem Relation Age of Onset   24 [facility] Arthritis-rheumatoid Mother     Dementia Mother     Heart Disease Father     Cancer Father     Hypertension Other        Current Outpatient Prescriptions   Medication Sig Dispense Refill    azithromycin (ZITHROMAX) 250 mg tablet Take two tablets now and then 1 tablet daily for four days 6 Tab 0    enoxaparin (LOVENOX) 40 mg/0.4 mL 0.4 mL by SubCUTAneous route daily. Last dose Sunday morning (2/11/18) 5 Syringe 0    metaxalone (SKELAXIN) 800 mg tablet Take 1 Tab by mouth three (3) times daily. Indications: Muscle Spasm 90 Tab 2    montelukast (SINGULAIR) 10 mg tablet TAKE 1 TABLET BY MOUTH EVERY DAY 90 Tab 0    enoxaparin (LOVENOX) 100 mg/mL Stop Warfarin 2/6; Inject sub-q as follows; 1 dose thur 2/8  night, 1 dose twice daily Friday,  1 dose Sat morning; resume post op 12 Syringe 0    butalbital-acetaminophen-caff (FIORICET) -40 mg per capsule Take 2 capsules every 8 hours as needed for headache 540 Cap 3    warfarin (COUMADIN) 5 mg tablet TAKE 2 AND 1/2 TABLETS BY MOUTH DAILY OR AS DIRECTED 75 Tab 0    nystatin (MYCOSTATIN) topical cream Apply  to affected area two (2) times a day. 15 g 0    lisinopril-hydroCHLOROthiazide (PRINZIDE, ZESTORETIC) 20-12.5 mg per tablet Take 1 Tab by mouth daily. 90 Tab 1    B.infantis-B.ani-B.long-B.bifi (PROBIOTIC 4X) 10-15 mg TbEC Take  by mouth.  red yeast rice extract 600 mg cap Take 600 mg by mouth now.  labetalol (NORMODYNE) 100 mg tablet TAKE ONE TABLET BY MOUTH TWICE DAILY 180 Tab 0    lansoprazole (PREVACID) 30 mg capsule TAKE 1 CAPSULE DAILY BEFOREBREAKFAST 90 Cap 3    raNITIdine (ZANTAC) 150 mg tablet TK 1 T PO HS  5    acetaminophen (TYLENOL) 500 mg tablet Take  by mouth every six (6) hours as needed for Pain. Visit Vitals    /80 (BP 1 Location: Left arm, BP Patient Position: Sitting)    Pulse 78    Temp 98.5 °F (36.9 °C) (Tympanic)    Resp 16    Ht 5' 10\" (1.778 m)    Wt 221 lb (100.2 kg)    SpO2 96%    BMI 31.71 kg/m2       PE  Well nourished in NAD  HEENT:   OP: clear. TMS: clear  Neck: supple w/o mass   Chest: clear. CV: RRR w/o m,r,g  Abd: soft, NT, w/o HSM or mass. Ext: tr edema. Neuro: NF. Assessment and Plan    Encounter Diagnoses   Name Primary?     Cough Yes    Essential hypertension      URI - most likely viral  jennifer AC prn; continue claritin  Expect slow improvement over 7 days; f/u worsening or unimproved 7 days  HTN - controlled  OV 3 mos or prn  I have explained plan to patient and the patient verbalizes understanding        Discussed the patient's BMI with him. The BMI follow up plan is as follows:     dietary management education, guidance, and counseling  encourage exercise  monitor weight  prescribed dietary intake    An After Visit Summary was printed and given to the patient.

## 2018-02-12 NOTE — PROGRESS NOTES
1. Have you been to the ER, urgent care clinic since your last visit? Hospitalized since your last visit? No    2. Have you seen or consulted any other health care providers outside of the [address] [place] [address] since your last visit? Include any pap smears or colon screening.  No

## 2018-02-16 ENCOUNTER — TELEPHONE (OUTPATIENT)
Dept: ORTHOPEDIC SURGERY | Age: 63
End: 2018-02-16

## 2018-02-16 NOTE — TELEPHONE ENCOUNTER
Order for Compression Stockings faxed to [facility] with W/C info, patient given their information to call re: getting his DME. He was told from [facility] they could not get them for him.

## 2018-02-19 ENCOUNTER — TELEPHONE (OUTPATIENT)
Dept: ORTHOPEDIC SURGERY | Age: 63
End: 2018-02-19

## 2018-02-19 ENCOUNTER — OFFICE VISIT (OUTPATIENT)
Dept: INTERNAL MEDICINE CLINIC | Age: 63
End: 2018-02-19

## 2018-02-19 VITALS
SYSTOLIC BLOOD PRESSURE: 110 MMHG | HEIGHT: 70 IN | OXYGEN SATURATION: 97 % | WEIGHT: 218 LBS | HEART RATE: 87 BPM | TEMPERATURE: 99 F | RESPIRATION RATE: 18 BRPM | BODY MASS INDEX: 31.21 KG/M2 | DIASTOLIC BLOOD PRESSURE: 62 MMHG

## 2018-02-19 DIAGNOSIS — R05.9 COUGH: ICD-10-CM

## 2018-02-19 DIAGNOSIS — J10.1 INFLUENZA A: Primary | ICD-10-CM

## 2018-02-19 DIAGNOSIS — Z86.718 PERSONAL HISTORY OF VENOUS THROMBOSIS AND EMBOLISM: Primary | ICD-10-CM

## 2018-02-19 LAB
QUICKVUE INFLUENZA TEST: NEGATIVE
QUICKVUE INFLUENZA TEST: POSITIVE
VALID INTERNAL CONTROL?: YES
VALID INTERNAL CONTROL?: YES

## 2018-02-19 RX ORDER — ALBUTEROL SULFATE 90 UG/1
1 AEROSOL, METERED RESPIRATORY (INHALATION)
Qty: 1 INHALER | Refills: 1 | Status: SHIPPED | OUTPATIENT
Start: 2018-02-19 | End: 2018-03-09 | Stop reason: ALTCHOICE

## 2018-02-19 RX ORDER — CODEINE PHOSPHATE AND GUAIFENESIN 10; 100 MG/5ML; MG/5ML
SOLUTION ORAL
Qty: 120 ML | Refills: 1 | Status: SHIPPED | OUTPATIENT
Start: 2018-02-19 | End: 2018-03-09 | Stop reason: ALTCHOICE

## 2018-02-19 NOTE — TELEPHONE ENCOUNTER
Thania Hines or Deyanira  from [employer] called in requesting to know what compression the patient needed for his compression stockings. Please contact at [phone].

## 2018-02-19 NOTE — MR AVS SNAPSHOT
[facility] 
 
 
 [address], [address] [facility] [address] 
[phone] Patient: Mary Franz MRN: [id number] FKY:[birth year] Visit Information Date & Time Provider Department Dept. Phone Encounter #  
 2/19/2018 11:15 AM Krystyna Alvarez NP [facility] INTERNAL MEDICINE OF Rosendo Velazquez [phone] [id number] Your Appointments 3/1/2018  9:45 AM  
POST OP with Garth Beltran PA-C  
[facility] - [address]) Appt Note: LEFT TOTAL KNEE ARTHROPLASTY  
 [address] [facility], [address] [facility]  
[phone]  
  
   
 [address], [address] [facility] [address]  
  
    
 3/6/2018  3:30 PM  
Follow Up with Arleen Sherman MD  
[address] [facility] [facility] [address]) Appt Note: 3 mo f/u  
 [address] [facility] [address]  
[phone]  
  
   
 [address]  
  
    
 3/23/2018 10:45 AM  
Follow Up with Kaitlin Gardner MD  
[facility] INTERNAL MEDICINE OF [place] [address]) Appt Note: 5 wk f/u  
 [address], [address] [facility] [address]  
  
   
 [address], [address] [facility] [address] 5/15/2018  3:30 PM  
Follow Up with Arleen Sherman MD  
[facility] ONE [address] [place] [address]) Appt Note: 3mo fu  
 [address] [facility] [address]  
[phone] Upcoming Health Maintenance Date Due Hepatitis C Screening 1955 ZOSTER VACCINE AGE 60> 2/13/2013 DTaP/Tdap/Td series (2 - Td) 12/6/2024 COLONOSCOPY 5/27/2026 Allergies as of 2/19/2018  Review Complete On: 2/19/2018 By: Walter Alfredo LPN Severity Noted Reaction Type Reactions Augmentin [Amoxicillin-pot Clavulanate]    Itching Penicillins    Rash Current Immunizations  Reviewed on 1/17/2018 Name Date Tdap 12/6/2014 Not reviewed this visit You Were Diagnosed With   
  
 Codes Comments Influenza A    -  Primary ICD-10-CM: J10.1 ICD-9-CM: 133.5 Cough     ICD-10-CM: R05 ICD-9-CM: 257. 2 Vitals BP Pulse Temp Resp Height(growth percentile) Weight(growth percentile) 110/62 (BP 1 Location: Left arm, BP Patient Position: Sitting) 87 99 °F (37.2 °C) (Tympanic) 18 5' 10\" (1.778 m) 218 lb (98.9 kg) SpO2 BMI Smoking Status 97% 31.28 kg/m2 Never Smoker BMI and BSA Data Body Mass Index Body Surface Area  
 31.28 kg/m 2 2.21 m 2 Preferred Pharmacy Pharmacy Name Phone [facility] [address] [place] [address] [phone] Your Updated Medication List  
  
   
This list is accurate as of: 2/19/18 12:45 PM.  Always use your most recent med list.  
  
  
  
  
 acetaminophen 500 mg tablet Commonly known as:  TYLENOL Take  by mouth every six (6) hours as needed for Pain. albuterol 90 mcg/actuation inhaler Commonly known as:  PROVENTIL HFA, VENTOLIN HFA, PROAIR HFA Take 1 Puff by inhalation every four (4) hours as needed for Wheezing. butalbital-acetaminophen-caff -40 mg per capsule Commonly known as:  [employer] Take 2 capsules every 8 hours as needed for headache * enoxaparin 100 mg/mL Commonly known as:  LOVENOX Stop Warfarin 2/6; Inject sub-q as follows; 1 dose thur 2/8  night, 1 dose twice daily Friday,  1 dose Sat morning; resume post op * enoxaparin 40 mg/0.4 mL Commonly known as:  LOVENOX  
0.4 mL by SubCUTAneous route daily. Last dose Sunday morning (2/11/18) guaiFENesin-codeine 100-10 mg/5 mL solution Commonly known as:  CHERATUSSIN AC  
1 or 2 tsp q 6 hrs prn cough  
  
 labetalol 100 mg tablet Commonly known as:  NORMODYNE  
TAKE ONE TABLET BY MOUTH TWICE DAILY  
  
 lansoprazole 30 mg capsule Commonly known as:  PREVACID  
 TAKE 1 CAPSULE DAILY BEFOREBREAKFAST  
  
 lisinopril-hydroCHLOROthiazide 20-12.5 mg per tablet Commonly known as:  Darylene Stapler Take 1 Tab by mouth daily. metaxalone 800 mg tablet Commonly known as:  SKELAXIN Take 1 Tab by mouth three (3) times daily. Indications: Muscle Spasm  
  
 montelukast 10 mg tablet Commonly known as:  SINGULAIR  
TAKE 1 TABLET BY MOUTH EVERY DAY  
  
 nystatin topical cream  
Commonly known as:  MYCOSTATIN Apply  to affected area two (2) times a day. PROBIOTIC 4X 10-15 mg Tbec Generic drug:  B.infantis-B.ani-B.long-B.bifi Take  by mouth. raNITIdine 150 mg tablet Commonly known as:  ZANTAC TK 1 T PO HS  
  
 red yeast rice extract 600 mg Cap Take 600 mg by mouth now.  
  
 warfarin 5 mg tablet Commonly known as:  COUMADIN  
TAKE 2 AND 1/2 TABLETS BY MOUTH DAILY OR AS DIRECTED * Notice: This list has 2 medication(s) that are the same as other medications prescribed for you. Read the directions carefully, and ask your doctor or other care provider to review them with you. Prescriptions Printed Refills  
 guaiFENesin-codeine (CHERATUSSIN AC) 100-10 mg/5 mL solution 1 Si or 2 tsp q 6 hrs prn cough Class: Print Prescriptions Sent to Pharmacy Refills  
 albuterol (PROVENTIL HFA, VENTOLIN HFA, PROAIR HFA) 90 mcg/actuation inhaler 1 Sig: Take 1 Puff by inhalation every four (4) hours as needed for Wheezing. Class: Normal  
 Pharmacy: [facility] [address] [place] [address] [address] [place] [address] #: [phone] Route: Inhalation We Performed the Following AMB POC RAPID INFLUENZA TEST [03453 CPT(R)] Comments:  
 Influenza A AMB POC RAPID INFLUENZA TEST [93942 CPT(R)] Comments:  
 Influenza B with modifier Introducing [facility]!    
 [employer] introduces [employer] patient portal. Now you can access parts of your medical record, email your doctor's office, and request medication refills online. 1. In your internet browser, go to [email or web address] 2. Click on the First Time User? Click Here link in the Sign In box. You will see the New Member Sign Up page. 3. Enter your [employer] Access Code exactly as it appears below. You will not need to use this code after youve completed the sign-up process. If you do not sign up before the expiration date, you must request a new code. · [employer] Access Code: [email or web address] Expires: 5/17/2018  9:23 AM 
 
4. Enter the last four digits of your Social Security Number (xxxx) and Date of Birth (mm/dd/yyyy) as indicated and click Submit. You will be taken to the next sign-up page. 5. Create a [employer] ID. This will be your [employer] login ID and cannot be changed, so think of one that is secure and easy to remember. 6. Create a [employer] password. You can change your password at any time. 7. Enter your Password Reset Question and Answer. This can be used at a later time if you forget your password. 8. Enter your e-mail address. You will receive e-mail notification when new information is available in [address] 9. Click Sign Up. You can now view and download portions of your medical record. 10. Click the Download Summary menu link to download a portable copy of your medical information. If you have questions, please visit the Frequently Asked Questions section of the [employer] website. Remember, [employer] is NOT to be used for urgent needs. For medical emergencies, dial 911. Now available from your iPhone and Android! Please provide this summary of care documentation to your next provider. Your primary care clinician is listed as Sera Juarez. If you have any questions after today's visit, please call [phone].

## 2018-02-19 NOTE — TELEPHONE ENCOUNTER
Per her note, 8) Will give a prescription for compression stockings.     Please ask Dr. Alarcon if she has a specific \"compression\" she orders

## 2018-02-19 NOTE — TELEPHONE ENCOUNTER
New order was placed per Dr. Saroj Cummins same as the previous one. 2 pair knee high calf 4 medial hose 30-40 mm[facility]hg [facility] Then faxed to Deyanira at [facility] faxed order to [phone].

## 2018-02-20 NOTE — PROGRESS NOTES
Rajani Woods is a 57 y.o. male presenting today for Nasal Congestion; Fever; and Generalized Body Aches  . HPI:  Rajani Woods presents to the office today for nasal congestion, non-productive cough, fever and generalized body aches intermittently over the last 2 weeks. Review of Systems   Constitutional: Positive for fever. HENT: Positive for congestion. Respiratory: Positive for cough. Negative for shortness of breath. Cardiovascular: Negative for chest pain and palpitations. Musculoskeletal:        Body aches       Allergies   Allergen Reactions    Augmentin [Amoxicillin-Pot Clavulanate] Itching    Penicillins Rash       Current Outpatient Prescriptions   Medication Sig Dispense Refill    guaiFENesin-codeine (CHERATUSSIN AC) 100-10 mg/5 mL solution 1 or 2 tsp q 6 hrs prn cough 120 mL 1    albuterol (PROVENTIL HFA, VENTOLIN HFA, PROAIR HFA) 90 mcg/actuation inhaler Take 1 Puff by inhalation every four (4) hours as needed for Wheezing. 1 Inhaler 1    metaxalone (SKELAXIN) 800 mg tablet Take 1 Tab by mouth three (3) times daily. Indications: Muscle Spasm 90 Tab 2    montelukast (SINGULAIR) 10 mg tablet TAKE 1 TABLET BY MOUTH EVERY DAY 90 Tab 0    butalbital-acetaminophen-caff (FIORICET) -40 mg per capsule Take 2 capsules every 8 hours as needed for headache 540 Cap 3    warfarin (COUMADIN) 5 mg tablet TAKE 2 AND 1/2 TABLETS BY MOUTH DAILY OR AS DIRECTED 75 Tab 0    nystatin (MYCOSTATIN) topical cream Apply  to affected area two (2) times a day. 15 g 0    lisinopril-hydroCHLOROthiazide (PRINZIDE, ZESTORETIC) 20-12.5 mg per tablet Take 1 Tab by mouth daily. 90 Tab 1    B.infantis-B.ani-B.long-B.bifi (PROBIOTIC 4X) 10-15 mg TbEC Take  by mouth.  red yeast rice extract 600 mg cap Take 600 mg by mouth now.       labetalol (NORMODYNE) 100 mg tablet TAKE ONE TABLET BY MOUTH TWICE DAILY 180 Tab 0    lansoprazole (PREVACID) 30 mg capsule TAKE 1 CAPSULE DAILY BEFOREBREAKFAST 90 Cap 3    raNITIdine (ZANTAC) 150 mg tablet TK 1 T PO HS  5    acetaminophen (TYLENOL) 500 mg tablet Take  by mouth every six (6) hours as needed for Pain.  enoxaparin (LOVENOX) 40 mg/0.4 mL 0.4 mL by SubCUTAneous route daily. Last dose Sunday morning (2/11/18) 5 Syringe 0    enoxaparin (LOVENOX) 100 mg/mL Stop Warfarin 2/6; Inject sub-q as follows; 1 dose thur 2/8  night, 1 dose twice daily Friday,  1 dose Sat morning; resume post op 12 Syringe 0       Past Medical History:   Diagnosis Date    Arthritis     Bleeding     Blood clot in the legs     Esophageal reflux     GERD (gastroesophageal reflux disease)     Headache(784.0)     migraine    High cholesterol     Hypertension     Lower back pain 11/6/2010    Other chest pain     Personal history of venous thrombosis and embolism     Pure hypercholesterolemia     Right buttock pain 11/6/2010    Right foot pain     Spinal stenosis     Tendonitis, tibialis     anterior    Thromboembolus ([facility] [address]     3 after sx        Past Surgical History:   Procedure Laterality Date    FOOT/TOES SURGERY PROC UNLISTED      HX BACK SURGERY      HX ORTHOPAEDIC  06-25-12    Right foot with excision of bursa and adipose tissue from fifth metatarsal base by Dr. Giorgi Chamberlain      lower back (1992 and 2000)    HX OTHER SURGICAL      left foot (2008)    LAMINOTOMY      NERVE BLOCK         Social History     Social History    Marital status:      Spouse name: N/A    Number of children: N/A    Years of education: N/A     Occupational History    Not on file.      Social History Main Topics    Smoking status: Never Smoker    Smokeless tobacco: Never Used    Alcohol use No    Drug use: No    Sexual activity: Not Currently     Other Topics Concern    Not on file     Social History Narrative       Patient does not have an advanced directive on file    Vitals:    02/19/18 1146   BP: 110/62   Pulse: 87   Resp: 18   Temp: 99 °F (37.2 °C) TempSrc: Tympanic   SpO2: 97%   Weight: 218 lb (98.9 kg)   Height: 5' 10\" (1.778 m)   PainSc:   7   PainLoc: Generalized       Physical Exam   Constitutional: No distress. HENT:   Right Ear: External ear normal.   Left Ear: External ear normal.   Mouth/Throat: No oropharyngeal exudate. Cardiovascular: Normal rate, regular rhythm and normal heart sounds. Pulmonary/Chest: Effort normal. No respiratory distress. Abdominal: Soft. Nursing note and vitals reviewed. Office Visit on 02/19/2018   Component Date Value Ref Range Status    VALID INTERNAL CONTROL POC 02/19/2018 Yes   Final    QuickVue Influenza test 02/19/2018 Positive  Negative Final    Influenza A    VALID INTERNAL CONTROL POC 02/19/2018 Yes   Final    QuickVue Influenza test 02/19/2018 Negative  Negative Final    Influenza B   Hospital Outpatient Visit on 01/31/2018   Component Date Value Ref Range Status    WBC 01/31/2018 7.3  4.6 - 13.2 K/uL Final    RBC 01/31/2018 4.42* 4.70 - 5.50 M/uL Final    HGB 01/31/2018 14.7  13.0 - 16.0 g/dL Final    HCT 01/31/2018 42.9  36.0 - 48.0 % Final    MCV 01/31/2018 97.1* 74.0 - 97.0 FL Final    MCH 01/31/2018 33.3  24.0 - 34.0 PG Final    MCHC 01/31/2018 34.3  31.0 - 37.0 g/dL Final    RDW 01/31/2018 12.5  11.6 - 14.5 % Final    PLATELET 61/45/2244 767  135 - 420 K/uL Final    MPV 01/31/2018 11.3  9.2 - 11.8 FL Final    NEUTROPHILS 01/31/2018 66  40 - 73 % Final    LYMPHOCYTES 01/31/2018 20* 21 - 52 % Final    MONOCYTES 01/31/2018 12* 3 - 10 % Final    EOSINOPHILS 01/31/2018 2  0 - 5 % Final    BASOPHILS 01/31/2018 0  0 - 2 % Final    ABS. NEUTROPHILS 01/31/2018 4.8  1.8 - 8.0 K/UL Final    ABS. LYMPHOCYTES 01/31/2018 1.4  0.9 - 3.6 K/UL Final    ABS. MONOCYTES 01/31/2018 0.9  0.05 - 1.2 K/UL Final    ABS. EOSINOPHILS 01/31/2018 0.2  0.0 - 0.4 K/UL Final    ABS.  BASOPHILS 01/31/2018 0.0  0.0 - 0.06 K/UL Final    DF 01/31/2018 AUTOMATED    Final    Prothrombin time 01/31/2018 17.0* 11.5 - 15.2 sec Final    INR 01/31/2018 1.4* 0.8 - 1.2   Final    Comment:            INR Therapeutic Ranges         (on stable oral anticoagulant):     INDICATION                INR  DVT/PE/Atrial Fib          2.0-3.0  MI/Mechanical Heart Valve  2.5-3.5      aPTT 01/31/2018 31.3  23.0 - 36.4 SEC Final    Sodium 01/31/2018 140  136 - 145 mmol/L Final    Potassium 01/31/2018 4.1  3.5 - 5.5 mmol/L Final    Chloride 01/31/2018 103  100 - 108 mmol/L Final    CO2 01/31/2018 30  21 - 32 mmol/L Final    Anion gap 01/31/2018 7  3.0 - 18 mmol/L Final    Glucose 01/31/2018 89  74 - 99 mg/dL Final    BUN 01/31/2018 12  7.0 - 18 MG/DL Final    Creatinine 01/31/2018 0.92  0.6 - 1.3 MG/DL Final    BUN/Creatinine ratio 01/31/2018 13  12 - 20   Final    GFR est AA 01/31/2018 >60  >60 ml/min/1.73m2 Final    GFR est non-AA 01/31/2018 >60  >60 ml/min/1.73m2 Final    Comment: (NOTE)  Estimated GFR is calculated using the Modification of Diet in Renal   Disease (MDRD) Study equation, reported for both  Americans   (GFRAA) and non- Americans (GFRNA), and normalized to 1.73m2   body surface area. The physician must decide which value applies to   the patient. The MDRD study equation should only be used in   individuals age 25 or older. It has not been validated for the   following: pregnant women, patients with serious comorbid conditions,   or on certain medications, or persons with extremes of body size,   muscle mass, or nutritional status.       Calcium 01/31/2018 9.3  8.5 - 10.1 MG/DL Final    Albumin 01/31/2018 3.9  3.4 - 5.0 g/dL Final    Hemoglobin A1c 01/31/2018 5.8* 4.2 - 5.6 % Final    Comment: (NOTE)  HbA1C Interpretive Ranges  <5.7              Normal  5.7 - 6.4         Consider Prediabetes  >6.5              Consider Diabetes      Est. average glucose 01/31/2018 120  mg/dL Final    Comment: (NOTE)  The eAG should be interpreted with patient characteristics in mind   since ethnicity, interindividual differences, red cell lifespan,   variation in rates of glycation, etc. may affect the validity of the   calculation.       Crossmatch Expiration 01/31/2018 02/14/2018   Final    ABO/Rh(D) 01/31/2018 B POSITIVE   Final    Antibody screen 01/31/2018 NEG   Final    Color 01/31/2018 YELLOW    Final    Appearance 01/31/2018 CLEAR    Final    Specific gravity 01/31/2018 1.012  1.005 - 1.030   Final    pH (UA) 01/31/2018 7.0  5.0 - 8.0   Final    Protein 01/31/2018 NEGATIVE   NEG mg/dL Final    Glucose 01/31/2018 NEGATIVE   NEG mg/dL Final    Ketone 01/31/2018 NEGATIVE   NEG mg/dL Final    Bilirubin 01/31/2018 NEGATIVE   NEG   Final    Blood 01/31/2018 NEGATIVE   NEG   Final    Urobilinogen 01/31/2018 0.2  0.2 - 1.0 EU/dL Final    Nitrites 01/31/2018 NEGATIVE   NEG   Final    Leukocyte Esterase 01/31/2018 NEGATIVE   NEG   Final    Special Requests: 01/31/2018 NO SPECIAL REQUESTS    Final    Culture result: 01/31/2018 NO GROWTH 1 DAY    Final    Ventricular Rate 01/31/2018 62  BPM Final    Atrial Rate 01/31/2018 62  BPM Final    P-R Interval 01/31/2018 180  ms Final    QRS Duration 01/31/2018 86  ms Final    Q-T Interval 01/31/2018 394  ms Final    QTC Calculation (Bezet) 01/31/2018 399  ms Final    Calculated P Axis 01/31/2018 58  degrees Final    Calculated R Axis 01/31/2018 12  degrees Final    Calculated T Axis 01/31/2018 64  degrees Final    Diagnosis 01/31/2018    Final                    Value:Normal sinus rhythm  Possible Left atrial enlargement  Cannot exclude Inferior infarct , age undetermined  Nonspecific ST and T wave abnormality Lateral leads  Borderline ECG  When compared with ECG of 11-JUN-2012 12:04,  No significant change was found  Confirmed by Uyen Montana ([id number]) on 2/1/2018 7:08:50 AM     Hospital Outpatient Visit on 01/24/2018   Component Date Value Ref Range Status    Creatinine, POC 01/24/2018 0.9  0.6 - 1.3 MG/DL Final    GFRAA, POC 01/24/2018 >60  >60 ml/min/1.73m2 Final    GFRNA, POC 01/24/2018 >60  >60 ml/min/1.73m2 Final    Comment: Estimated GFR is calculated using the IDMS-traceable Modification of Diet in Renal Disease (MDRD) Study equation, reported for both  Americans (GFRAA) and non- Americans (GFRNA), and normalized to 1.73m2 body surface area. The physician must decide which value applies to the patient. The MDRD study equation should only be used in individuals age 25 or older. It has not been validated for the following: pregnant women, patients with serious comorbid conditions, or on certain medications, or persons with extremes of body size, muscle mass, or nutritional status. Office Visit on 01/18/2018   Component Date Value Ref Range Status    VALID INTERNAL CONTROL POC 01/18/2018 Yes   Final    INR POC 01/18/2018 2.1   Final   Anti-Coag visit on 01/02/2018   Component Date Value Ref Range Status    VALID INTERNAL CONTROL POC 01/02/2018 Yes   Final    INR POC 01/02/2018 2.1   Final   Anti-Coag visit on 12/11/2017   Component Date Value Ref Range Status    VALID INTERNAL CONTROL POC 12/11/2017 Yes   Final    INR POC 12/11/2017 2.7   Final       .  Results for orders placed or performed in visit on 02/19/18   AMB POC RAPID INFLUENZA TEST   Result Value Ref Range    VALID INTERNAL CONTROL POC Yes     QuickVue Influenza test Positive Negative   AMB POC RAPID INFLUENZA TEST   Result Value Ref Range    VALID INTERNAL CONTROL POC Yes     QuickVue Influenza test Negative Negative       Assessment / Plan:      ICD-10-CM ICD-9-CM    1. Influenza A J10.1 487.1 AMB POC RAPID INFLUENZA TEST      AMB POC RAPID INFLUENZA TEST   2. Cough R05 786.2 guaiFENesin-codeine (CHERATUSSIN AC) 100-10 mg/5 mL solution      albuterol (PROVENTIL HFA, VENTOLIN HFA, PROAIR HFA) 90 mcg/actuation inhaler     Cheratussin rx  Proventil rx    Follow-up Disposition:  Return if symptoms worsen or fail to improve.     I asked the patient if he  had any questions and answered his  questions.   The patient stated that he understands the treatment plan and agrees with the treatment plan

## 2018-02-21 ENCOUNTER — TELEPHONE (OUTPATIENT)
Dept: ORTHOPEDIC SURGERY | Facility: CLINIC | Age: 63
End: 2018-02-21

## 2018-02-21 NOTE — TELEPHONE ENCOUNTER
Mr. Gabriele Adame has been R/S for 3/27/18 to have his Lt TKR. He dropped the forms for his work off a few weeks ago and is asking if they are completed for him to  and return to his work. Also states he dropped them off at the [facility] office. Please call him with an update.

## 2018-02-22 ENCOUNTER — TELEPHONE (OUTPATIENT)
Dept: INTERNAL MEDICINE CLINIC | Age: 63
End: 2018-02-22

## 2018-02-22 NOTE — TELEPHONE ENCOUNTER
Called concerned about how infectious or not. He was advised to wear a mask and advised to monitor if he has a fever and proper hand hygiene.  And informed that letter will be ready in AM

## 2018-02-22 NOTE — TELEPHONE ENCOUNTER
Patient called and needs a letter for work. Was seen by Geovany Alva last week. Out of work 2/8/18 thru 2/23/18 will return on 2/26/18. He also has a couple of questions for Francisca Puckett. Please call him at [phone].

## 2018-02-23 NOTE — TELEPHONE ENCOUNTER
PATIENT'S WIFE IS CHECKING STATUS OF FORM. SHE WOULD LIKE FOR SOMEONE TO CALL HER BACK TODAY.     HER# [phone]

## 2018-02-26 NOTE — TELEPHONE ENCOUNTER
Patient wife called again for a status update on the forms that were dropped off.  Please advise patient at [phone]

## 2018-02-27 ENCOUNTER — DOCUMENTATION ONLY (OUTPATIENT)
Dept: ORTHOPEDIC SURGERY | Facility: CLINIC | Age: 63
End: 2018-02-27

## 2018-03-05 RX ORDER — WARFARIN SODIUM 5 MG/1
TABLET ORAL
Qty: 75 TAB | Refills: 0 | Status: SHIPPED | OUTPATIENT
Start: 2018-03-05 | End: 2018-04-24 | Stop reason: SDUPTHER

## 2018-03-08 ENCOUNTER — OFFICE VISIT (OUTPATIENT)
Dept: INTERNAL MEDICINE CLINIC | Age: 63
End: 2018-03-08

## 2018-03-08 VITALS
RESPIRATION RATE: 16 BRPM | BODY MASS INDEX: 31.21 KG/M2 | HEIGHT: 70 IN | HEART RATE: 81 BPM | SYSTOLIC BLOOD PRESSURE: 136 MMHG | WEIGHT: 218 LBS | TEMPERATURE: 98.5 F | OXYGEN SATURATION: 98 % | DIASTOLIC BLOOD PRESSURE: 78 MMHG

## 2018-03-08 DIAGNOSIS — J06.9 ACUTE URI: ICD-10-CM

## 2018-03-08 DIAGNOSIS — M17.12 PRIMARY OSTEOARTHRITIS OF LEFT KNEE: Primary | ICD-10-CM

## 2018-03-08 RX ORDER — AZELASTINE 1 MG/ML
SPRAY, METERED NASAL
Qty: 1 BOTTLE | Refills: 1 | Status: SHIPPED | OUTPATIENT
Start: 2018-03-08 | End: 2018-08-30 | Stop reason: ALTCHOICE

## 2018-03-08 RX ORDER — PREDNISONE 20 MG/1
TABLET ORAL
Qty: 8 TAB | Refills: 0 | Status: SHIPPED | OUTPATIENT
Start: 2018-03-08 | End: 2018-04-09 | Stop reason: ALTCHOICE

## 2018-03-08 RX ORDER — CLARITHROMYCIN 250 MG/1
TABLET, FILM COATED ORAL
Qty: 14 TAB | Refills: 0 | Status: SHIPPED | OUTPATIENT
Start: 2018-03-08 | End: 2018-03-16

## 2018-03-09 NOTE — PROGRESS NOTES
The patient presents to the office today with the chief complaint of cough    HPI    The patient persists with sinus drainage and a bothersome cough. The problem has been going on for the past month. The patient denies dyspnea or fever. The patient persists with severe left knee pain. The patient is using a cane to walk. The patient is scheduled for a left knee replacment. Review of Systems   HENT: Positive for congestion. Respiratory: Positive for cough. Negative for shortness of breath. Cardiovascular: Negative for chest pain and leg swelling. Allergies   Allergen Reactions    Augmentin [Amoxicillin-Pot Clavulanate] Itching    Penicillins Rash       Current Outpatient Prescriptions   Medication Sig Dispense Refill    clarithromycin (BIAXIN) 250 mg tablet 1 tab twice per day with food 14 Tab 0    predniSONE (DELTASONE) 20 mg tablet 2 tabs daily x 2 days, 1 tab daily x 2 days, 1/2 tab daily x 4 days 8 Tab 0    azelastine (ASTELIN) 137 mcg (0.1 %) nasal spray 1 spray up each nostril twice per day 1 Bottle 1    warfarin (COUMADIN) 5 mg tablet TAKE 2 AND 1/2 TABLETS BY MOUTH DAILY OR AS DIRECTED 75 Tab 0    metaxalone (SKELAXIN) 800 mg tablet Take 1 Tab by mouth three (3) times daily. Indications: Muscle Spasm 90 Tab 2    montelukast (SINGULAIR) 10 mg tablet TAKE 1 TABLET BY MOUTH EVERY DAY 90 Tab 0    butalbital-acetaminophen-caff (FIORICET) -40 mg per capsule Take 2 capsules every 8 hours as needed for headache 540 Cap 3    warfarin (COUMADIN) 5 mg tablet TAKE 2 AND 1/2 TABLETS BY MOUTH DAILY OR AS DIRECTED 75 Tab 0    nystatin (MYCOSTATIN) topical cream Apply  to affected area two (2) times a day. 15 g 0    lisinopril-hydroCHLOROthiazide (PRINZIDE, ZESTORETIC) 20-12.5 mg per tablet Take 1 Tab by mouth daily. 90 Tab 1    B.infantis-B.ani-B.long-B.bifi (PROBIOTIC 4X) 10-15 mg TbEC Take  by mouth.  red yeast rice extract 600 mg cap Take 600 mg by mouth now.       labetalol (NORMODYNE) 100 mg tablet TAKE ONE TABLET BY MOUTH TWICE DAILY 180 Tab 0    lansoprazole (PREVACID) 30 mg capsule TAKE 1 CAPSULE DAILY BEFOREBREAKFAST 90 Cap 3    raNITIdine (ZANTAC) 150 mg tablet TK 1 T PO HS  5    acetaminophen (TYLENOL) 500 mg tablet Take  by mouth every six (6) hours as needed for Pain. Past Medical History:   Diagnosis Date    Arthritis     Bleeding     Blood clot in the legs     Esophageal reflux     GERD (gastroesophageal reflux disease)     Headache(784.0)     migraine    High cholesterol     Hypertension     Lower back pain 11/6/2010    Other chest pain     Personal history of venous thrombosis and embolism     Pure hypercholesterolemia     Right buttock pain 11/6/2010    Right foot pain     Spinal stenosis     Tendonitis, tibialis     anterior    Thromboembolus ([facility] [address]     3 after sx        Past Surgical History:   Procedure Laterality Date    FOOT/TOES SURGERY PROC UNLISTED      HX BACK SURGERY      HX ORTHOPAEDIC  06-25-12    Right foot with excision of bursa and adipose tissue from fifth metatarsal base by Dr. Zarina Hoang      lower back (1992 and 2000)    HX OTHER SURGICAL      left foot (2008)    LAMINOTOMY      NERVE BLOCK         Social History     Social History    Marital status:      Spouse name: N/A    Number of children: N/A    Years of education: N/A     Occupational History    Not on file.      Social History Main Topics    Smoking status: Never Smoker    Smokeless tobacco: Never Used    Alcohol use No    Drug use: No    Sexual activity: Not Currently     Other Topics Concern    Not on file     Social History Narrative       Patient does not have an advanced directive on file    Visit Vitals    /78 (BP 1 Location: Left arm, BP Patient Position: Sitting)    Pulse 81    Temp 98.5 °F (36.9 °C) (Tympanic)    Resp 16    Ht 5' 10\" (1.778 m)    Wt 218 lb (98.9 kg)    SpO2 98%    BMI 31.28 kg/m2 Physical Exam   HENT:   Mouth/Throat: No oropharyngeal exudate. Ears are normal bilaterally   Cardiovascular: Exam reveals no gallop. No murmur heard. Pulmonary/Chest: He has no wheezes. He has no rales. Abdominal: Soft. Bowel sounds are normal. He exhibits no distension. There is no tenderness. Musculoskeletal: He exhibits no edema. End stage osteoarthritis left knee       BMI:  BMI is high but it was not addressed during this visit due to upcoming surgery      Office Visit on 02/19/2018   Component Date Value Ref Range Status    VALID INTERNAL CONTROL POC 02/19/2018 Yes   Final    QuickVue Influenza test 02/19/2018 Positive  Negative Final    Influenza A    VALID INTERNAL CONTROL POC 02/19/2018 Yes   Final    QuickVue Influenza test 02/19/2018 Negative  Negative Final    Influenza B   Hospital Outpatient Visit on 01/31/2018   Component Date Value Ref Range Status    WBC 01/31/2018 7.3  4.6 - 13.2 K/uL Final    RBC 01/31/2018 4.42* 4.70 - 5.50 M/uL Final    HGB 01/31/2018 14.7  13.0 - 16.0 g/dL Final    HCT 01/31/2018 42.9  36.0 - 48.0 % Final    MCV 01/31/2018 97.1* 74.0 - 97.0 FL Final    MCH 01/31/2018 33.3  24.0 - 34.0 PG Final    MCHC 01/31/2018 34.3  31.0 - 37.0 g/dL Final    RDW 01/31/2018 12.5  11.6 - 14.5 % Final    PLATELET 16/11/4433 097  135 - 420 K/uL Final    MPV 01/31/2018 11.3  9.2 - 11.8 FL Final    NEUTROPHILS 01/31/2018 66  40 - 73 % Final    LYMPHOCYTES 01/31/2018 20* 21 - 52 % Final    MONOCYTES 01/31/2018 12* 3 - 10 % Final    EOSINOPHILS 01/31/2018 2  0 - 5 % Final    BASOPHILS 01/31/2018 0  0 - 2 % Final    ABS. NEUTROPHILS 01/31/2018 4.8  1.8 - 8.0 K/UL Final    ABS. LYMPHOCYTES 01/31/2018 1.4  0.9 - 3.6 K/UL Final    ABS. MONOCYTES 01/31/2018 0.9  0.05 - 1.2 K/UL Final    ABS. EOSINOPHILS 01/31/2018 0.2  0.0 - 0.4 K/UL Final    ABS.  BASOPHILS 01/31/2018 0.0  0.0 - 0.06 K/UL Final    DF 01/31/2018 AUTOMATED    Final    Prothrombin time 01/31/2018 17.0* 11.5 - 15.2 sec Final    INR 01/31/2018 1.4* 0.8 - 1.2   Final    Comment:            INR Therapeutic Ranges         (on stable oral anticoagulant):     INDICATION                INR  DVT/PE/Atrial Fib          2.0-3.0  MI/Mechanical Heart Valve  2.5-3.5      aPTT 01/31/2018 31.3  23.0 - 36.4 SEC Final    Sodium 01/31/2018 140  136 - 145 mmol/L Final    Potassium 01/31/2018 4.1  3.5 - 5.5 mmol/L Final    Chloride 01/31/2018 103  100 - 108 mmol/L Final    CO2 01/31/2018 30  21 - 32 mmol/L Final    Anion gap 01/31/2018 7  3.0 - 18 mmol/L Final    Glucose 01/31/2018 89  74 - 99 mg/dL Final    BUN 01/31/2018 12  7.0 - 18 MG/DL Final    Creatinine 01/31/2018 0.92  0.6 - 1.3 MG/DL Final    BUN/Creatinine ratio 01/31/2018 13  12 - 20   Final    GFR est AA 01/31/2018 >60  >60 ml/min/1.73m2 Final    GFR est non-AA 01/31/2018 >60  >60 ml/min/1.73m2 Final    Comment: (NOTE)  Estimated GFR is calculated using the Modification of Diet in Renal   Disease (MDRD) Study equation, reported for both  Americans   (GFRAA) and non- Americans (GFRNA), and normalized to 1.73m2   body surface area. The physician must decide which value applies to   the patient. The MDRD study equation should only be used in   individuals age 25 or older. It has not been validated for the   following: pregnant women, patients with serious comorbid conditions,   or on certain medications, or persons with extremes of body size,   muscle mass, or nutritional status.       Calcium 01/31/2018 9.3  8.5 - 10.1 MG/DL Final    Albumin 01/31/2018 3.9  3.4 - 5.0 g/dL Final    Hemoglobin A1c 01/31/2018 5.8* 4.2 - 5.6 % Final    Comment: (NOTE)  HbA1C Interpretive Ranges  <5.7              Normal  5.7 - 6.4         Consider Prediabetes  >6.5              Consider Diabetes      Est. average glucose 01/31/2018 120  mg/dL Final    Comment: (NOTE)  The eAG should be interpreted with patient characteristics in mind   since ethnicity, interindividual differences, red cell lifespan,   variation in rates of glycation, etc. may affect the validity of the   calculation.       Crossmatch Expiration 01/31/2018 02/14/2018   Final    ABO/Rh(D) 01/31/2018 B POSITIVE   Final    Antibody screen 01/31/2018 NEG   Final    Color 01/31/2018 YELLOW    Final    Appearance 01/31/2018 CLEAR    Final    Specific gravity 01/31/2018 1.012  1.005 - 1.030   Final    pH (UA) 01/31/2018 7.0  5.0 - 8.0   Final    Protein 01/31/2018 NEGATIVE   NEG mg/dL Final    Glucose 01/31/2018 NEGATIVE   NEG mg/dL Final    Ketone 01/31/2018 NEGATIVE   NEG mg/dL Final    Bilirubin 01/31/2018 NEGATIVE   NEG   Final    Blood 01/31/2018 NEGATIVE   NEG   Final    Urobilinogen 01/31/2018 0.2  0.2 - 1.0 EU/dL Final    Nitrites 01/31/2018 NEGATIVE   NEG   Final    Leukocyte Esterase 01/31/2018 NEGATIVE   NEG   Final    Special Requests: 01/31/2018 NO SPECIAL REQUESTS    Final    Culture result: 01/31/2018 NO GROWTH 1 DAY    Final    Ventricular Rate 01/31/2018 62  BPM Final    Atrial Rate 01/31/2018 62  BPM Final    P-R Interval 01/31/2018 180  ms Final    QRS Duration 01/31/2018 86  ms Final    Q-T Interval 01/31/2018 394  ms Final    QTC Calculation (Bezet) 01/31/2018 399  ms Final    Calculated P Axis 01/31/2018 58  degrees Final    Calculated R Axis 01/31/2018 12  degrees Final    Calculated T Axis 01/31/2018 64  degrees Final    Diagnosis 01/31/2018    Final                    Value:Normal sinus rhythm  Possible Left atrial enlargement  Cannot exclude Inferior infarct , age undetermined  Nonspecific ST and T wave abnormality Lateral leads  Borderline ECG  When compared with ECG of 11-JUN-2012 12:04,  No significant change was found  Confirmed by Luis Alberto Olivier ([id number]) on 2/1/2018 7:08:50 AM     Hospital Outpatient Visit on 01/24/2018   Component Date Value Ref Range Status    Creatinine, POC 01/24/2018 0.9  0.6 - 1.3 MG/DL Final    GFRAA, POC 01/24/2018 >60  >60 ml/min/1.73m2 Final    GFRNA, POC 01/24/2018 >60  >60 ml/min/1.73m2 Final    Comment: Estimated GFR is calculated using the IDMS-traceable Modification of Diet in Renal Disease (MDRD) Study equation, reported for both  Americans (GFRAA) and non- Americans (GFRNA), and normalized to 1.73m2 body surface area. The physician must decide which value applies to the patient. The MDRD study equation should only be used in individuals age 25 or older. It has not been validated for the following: pregnant women, patients with serious comorbid conditions, or on certain medications, or persons with extremes of body size, muscle mass, or nutritional status. Office Visit on 01/18/2018   Component Date Value Ref Range Status    VALID INTERNAL CONTROL POC 01/18/2018 Yes   Final    INR POC 01/18/2018 2.1   Final   Anti-Coag visit on 01/02/2018   Component Date Value Ref Range Status    VALID INTERNAL CONTROL POC 01/02/2018 Yes   Final    INR POC 01/02/2018 2.1   Final   Anti-Coag visit on 12/11/2017   Component Date Value Ref Range Status    VALID INTERNAL CONTROL POC 12/11/2017 Yes   Final    INR POC 12/11/2017 2.7   Final       .No results found for any visits on 03/08/18. Assessment / Plan      ICD-10-CM ICD-9-CM    1. Primary osteoarthritis of left knee M17.12 715.16    2. Acute URI J06.9 465.9        Ceftin  Prednisone  Astelin nasal spray  he was advised to continue his maintenance medications  he is to call if symptoms persist over five days  If the drainage stops and the pre op tests are ok then THE PATIENT IS OK FOR SURGERY      Follow-up Disposition:  Return in about 4 months (around 7/8/2018). I asked Julia Noonan if he has any questions and I answered the questions.   Julia Noonan states that he understands the treatment plan and agrees with the treatment plan

## 2018-03-14 ENCOUNTER — TELEPHONE (OUTPATIENT)
Dept: INTERNAL MEDICINE CLINIC | Age: 63
End: 2018-03-14

## 2018-03-14 NOTE — TELEPHONE ENCOUNTER
Patient states he still has a cough, but it is a little better. His ears are still stopped up, no pain. He is also hoarse, but no sore throat. He has 2 more clarithromycin tablets left. He has 2 days of prednisone to take 1/2 tablet each day. He is scheduled for knee replacement surgery on 3/27. Please advise.

## 2018-03-15 DIAGNOSIS — M17.12 PRIMARY OSTEOARTHRITIS OF LEFT KNEE: ICD-10-CM

## 2018-03-15 DIAGNOSIS — Z01.818 PREOP EXAMINATION: Primary | ICD-10-CM

## 2018-03-16 ENCOUNTER — HOSPITAL ENCOUNTER (OUTPATIENT)
Dept: PREADMISSION TESTING | Age: 63
Discharge: HOME | End: 2018-03-16
Payer: COMMERCIAL

## 2018-03-16 ENCOUNTER — DOCUMENTATION ONLY (OUTPATIENT)
Dept: ORTHOPEDIC SURGERY | Age: 63
End: 2018-03-16

## 2018-03-16 DIAGNOSIS — Z01.818 PREOP EXAMINATION: ICD-10-CM

## 2018-03-16 DIAGNOSIS — M17.12 PRIMARY OSTEOARTHRITIS OF LEFT KNEE: ICD-10-CM

## 2018-03-16 LAB
ABO + RH BLD: NORMAL
ANION GAP SERPL CALC-SCNC: 8 MMOL/L (ref 3–18)
APPEARANCE UR: CLEAR
APTT PPP: 32.2 SEC (ref 23–36.4)
BACTERIA URNS QL MICRO: NEGATIVE /HPF
BASOPHILS # BLD: 0 K/UL (ref 0–0.06)
BASOPHILS NFR BLD: 0 % (ref 0–2)
BILIRUB UR QL: NEGATIVE
BLOOD GROUP ANTIBODIES SERPL: NORMAL
BUN SERPL-MCNC: 16 MG/DL (ref 7–18)
BUN/CREAT SERPL: 16 (ref 12–20)
CALCIUM SERPL-MCNC: 9.1 MG/DL (ref 8.5–10.1)
CHLORIDE SERPL-SCNC: 104 MMOL/L (ref 100–108)
CO2 SERPL-SCNC: 30 MMOL/L (ref 21–32)
COLOR UR: YELLOW
CREAT SERPL-MCNC: 0.98 MG/DL (ref 0.6–1.3)
DIFFERENTIAL METHOD BLD: ABNORMAL
EOSINOPHIL # BLD: 0.1 K/UL (ref 0–0.4)
EOSINOPHIL NFR BLD: 1 % (ref 0–5)
EPITH CASTS URNS QL MICRO: NORMAL /LPF (ref 0–5)
ERYTHROCYTE [DISTWIDTH] IN BLOOD BY AUTOMATED COUNT: 13 % (ref 11.6–14.5)
GLUCOSE SERPL-MCNC: 126 MG/DL (ref 74–99)
GLUCOSE UR STRIP.AUTO-MCNC: NEGATIVE MG/DL
HCT VFR BLD AUTO: 42.3 % (ref 36–48)
HGB BLD-MCNC: 14.7 G/DL (ref 13–16)
HGB UR QL STRIP: NEGATIVE
INR PPP: 2.2 (ref 0.8–1.2)
KETONES UR QL STRIP.AUTO: NEGATIVE MG/DL
LEUKOCYTE ESTERASE UR QL STRIP.AUTO: ABNORMAL
LYMPHOCYTES # BLD: 1.5 K/UL (ref 0.9–3.6)
LYMPHOCYTES NFR BLD: 17 % (ref 21–52)
MCH RBC QN AUTO: 32.8 PG (ref 24–34)
MCHC RBC AUTO-ENTMCNC: 34.8 G/DL (ref 31–37)
MCV RBC AUTO: 94.4 FL (ref 74–97)
MONOCYTES # BLD: 0.9 K/UL (ref 0.05–1.2)
MONOCYTES NFR BLD: 11 % (ref 3–10)
NEUTS SEG # BLD: 6 K/UL (ref 1.8–8)
NEUTS SEG NFR BLD: 71 % (ref 40–73)
NITRITE UR QL STRIP.AUTO: NEGATIVE
PH UR STRIP: 6.5 [PH] (ref 5–8)
PLATELET # BLD AUTO: 168 K/UL (ref 135–420)
PMV BLD AUTO: 11.3 FL (ref 9.2–11.8)
POTASSIUM SERPL-SCNC: 4 MMOL/L (ref 3.5–5.5)
PROT UR STRIP-MCNC: NEGATIVE MG/DL
PROTHROMBIN TIME: 23.5 SEC (ref 11.5–15.2)
RBC # BLD AUTO: 4.48 M/UL (ref 4.7–5.5)
RBC #/AREA URNS HPF: NORMAL /HPF (ref 0–5)
SODIUM SERPL-SCNC: 142 MMOL/L (ref 136–145)
SP GR UR REFRACTOMETRY: 1.01 (ref 1–1.03)
SPECIMEN EXP DATE BLD: NORMAL
UROBILINOGEN UR QL STRIP.AUTO: 0.2 EU/DL (ref 0.2–1)
WBC # BLD AUTO: 8.5 K/UL (ref 4.6–13.2)
WBC URNS QL MICRO: NORMAL /HPF (ref 0–4)

## 2018-03-16 PROCEDURE — 87086 URINE CULTURE/COLONY COUNT: CPT | Performed by: PHYSICIAN ASSISTANT

## 2018-03-16 PROCEDURE — 80048 BASIC METABOLIC PNL TOTAL CA: CPT | Performed by: PHYSICIAN ASSISTANT

## 2018-03-16 PROCEDURE — 85025 COMPLETE CBC W/AUTO DIFF WBC: CPT | Performed by: PHYSICIAN ASSISTANT

## 2018-03-16 PROCEDURE — 85610 PROTHROMBIN TIME: CPT | Performed by: PHYSICIAN ASSISTANT

## 2018-03-16 PROCEDURE — 86900 BLOOD TYPING SEROLOGIC ABO: CPT | Performed by: PHYSICIAN ASSISTANT

## 2018-03-16 PROCEDURE — 36415 COLL VENOUS BLD VENIPUNCTURE: CPT | Performed by: PHYSICIAN ASSISTANT

## 2018-03-16 PROCEDURE — 85730 THROMBOPLASTIN TIME PARTIAL: CPT | Performed by: PHYSICIAN ASSISTANT

## 2018-03-16 PROCEDURE — 81001 URINALYSIS AUTO W/SCOPE: CPT | Performed by: PHYSICIAN ASSISTANT

## 2018-03-17 LAB
BACTERIA SPEC CULT: NORMAL
SERVICE CMNT-IMP: NORMAL

## 2018-03-19 ENCOUNTER — OFFICE VISIT (OUTPATIENT)
Dept: INTERNAL MEDICINE CLINIC | Age: 63
End: 2018-03-19

## 2018-03-19 VITALS
RESPIRATION RATE: 20 BRPM | WEIGHT: 220 LBS | SYSTOLIC BLOOD PRESSURE: 138 MMHG | BODY MASS INDEX: 31.5 KG/M2 | DIASTOLIC BLOOD PRESSURE: 80 MMHG | TEMPERATURE: 98.9 F | HEIGHT: 70 IN | HEART RATE: 77 BPM | OXYGEN SATURATION: 96 %

## 2018-03-19 DIAGNOSIS — I10 ESSENTIAL HYPERTENSION: ICD-10-CM

## 2018-03-19 DIAGNOSIS — J06.9 ACUTE URI: ICD-10-CM

## 2018-03-19 DIAGNOSIS — M17.12 PRIMARY OSTEOARTHRITIS OF LEFT KNEE: Primary | ICD-10-CM

## 2018-03-19 RX ORDER — LEVOFLOXACIN 750 MG/1
750 TABLET ORAL DAILY
Refills: 0 | Status: ON HOLD | COMMUNITY
Start: 2018-03-16 | End: 2018-03-27

## 2018-03-19 NOTE — MR AVS SNAPSHOT
[address] [facility] 
 
 
 [address] [facility], [address] [facility] [address] 
[phone] Patient: Anupam Ross MRN: [id number] [facility]:[birth year] Visit Information Date & Time Provider Department Dept. Phone Encounter #  
 3/19/2018  9:30 AM Joanna Angela MD [facility] INTERNAL MEDICINE OF [place] [address] [phone] [id number] Your Appointments 3/22/2018  8:30 AM  
HISTORY AND PHYSICAL with Lyndsey Gamboa, [address] [place] [address] and [facility] [address] [place] [address] [facility] Appt Note: LEFT TOTAL KNEE ARTHROPLASTY; LEFT TOTAL KNEE ARTHROPLASTY  
 [address] [facility], [address] [facility]  
[phone]  
  
   
 [address] [place] [address], [address] [facility] [address]  
  
    
 4/30/2018  9:30 AM  
Office Visit with Joanna Angela MD  
[facility] INTERNAL [facility]ICINE OF [facility] Appt Note: 1 mo f/u  
 [address] [facility], [address] [facility] [address]  
  
   
 [address] [facility], [address] [facility] [address] 5/15/2018  3:30 PM  
Follow Up with Brenda William MD  
[facility] Appt Note: 3mo fu  
 [address] [facility] [address]  
[phone]  
  
   
 [address] [facility] [address] Upcoming Health Maintenance Date Due Hepatitis C Screening 1955 ZOSTER VACCINE AGE 60> 2/13/2013 DTaP/Tdap/Td series (2 - Td) 12/6/2024 COLONOSCOPY 5/27/2026 Allergies as of 3/19/2018  Review Complete On: 3/19/2018 By: Elicia Klinefelter, LPN Severity Noted Reaction Type Reactions Augmentin [Amoxicillin-pot Clavulanate]    Itching Hibiclens [Chlorhexidine Gluconate]  03/16/2018    Itching Penicillins    Rash Current Immunizations  Reviewed on 3/16/2018 Name Date Tdap 12/6/2014 Not reviewed this visit Vitals BP Pulse Temp Resp Height(growth percentile) 138/80 (BP 1 Location: Right arm, BP Patient Position: Sitting) 77 98.9 °F (37.2 °C) (Tympanic) 20 5' 10\" (1.778 m) Weight(growth percentile) SpO2 BMI Smoking Status 220 lb (99.8 kg) 96% 31.57 kg/m2 Never Smoker Vitals History BMI and BSA Data Body Mass Index Body Surface Area  
 31.57 kg/m 2 2.22 m 2 Preferred Pharmacy Pharmacy Name Phone [facility] Jameson Wilkinson [address] [place] [address] [phone] Your Updated Medication List  
  
   
This list is accurate as of 3/19/18 10:44 AM.  Always use your most recent med list.  
  
  
  
  
 acetaminophen 500 mg tablet Commonly known as:  TYLENOL Take 1,000 mg by mouth every six (6) hours as needed for Pain. azelastine 137 mcg (0.1 %) nasal spray Commonly known as:  ASTELIN  
1 spray up each nostril twice per day  
  
 butalbital-acetaminophen-caff -40 mg per capsule Commonly known as:  [employer] Take 2 capsules every 8 hours as needed for headache CLARITIN-D 24 HOUR PO Take  by mouth daily. labetalol 100 mg tablet Commonly known as:  NORMODYNE  
TAKE ONE TABLET BY MOUTH TWICE DAILY  
  
 lansoprazole 30 mg capsule Commonly known as:  PREVACID TAKE 1 CAPSULE DAILY BEFOREBREAKFAST  
  
 levoFLOXacin 750 mg tablet Commonly known as:  Floy Gottron Take 750 mg by mouth daily. Daily for 5 days  
  
 lisinopril-hydroCHLOROthiazide 20-12.5 mg per tablet Commonly known as:  Virginia Chandler Take 1 Tab by mouth daily. metaxalone 800 mg tablet Commonly known as:  SKELAXIN Take 1 Tab by mouth three (3) times daily. Indications: Muscle Spasm  
  
 montelukast 10 mg tablet Commonly known as:  SINGULAIR  
TAKE 1 TABLET BY MOUTH EVERY DAY  
  
 nystatin topical cream  
Commonly known as:  MYCOSTATIN Apply  to affected area two (2) times a day. [employer] 1.5 billion cell Cap Generic drug:  L. gasseri-B. bifidum-B longum Take  by mouth daily. predniSONE 20 mg tablet Commonly known as:  DELTASONE  
2 tabs daily x 2 days, 1 tab daily x 2 days, 1/2 tab daily x 4 days  
  
 raNITIdine 150 mg tablet Commonly known as:  ZANTAC TK 1 T PO HS  
  
 red yeast rice extract 600 mg Cap Take 1,200 mg by mouth daily. warfarin 5 mg tablet Commonly known as:  COUMADIN  
TAKE 2 AND 1/2 TABLETS BY MOUTH DAILY OR AS DIRECTED Introducing [facility]! Karlo Navarro introduces [employer] patient portal. Now you can access parts of your medical record, email your doctor's office, and request medication refills online. 1. In your internet browser, go to [email or web address] 2. Click on the First Time User? Click Here link in the Sign In box. You will see the New Member Sign Up page. 3. Enter your [employer] Access Code exactly as it appears below. You will not need to use this code after youve completed the sign-up process. If you do not sign up before the expiration date, you must request a new code. · [employer] Access Code: [email or web address] Expires: 5/17/2018 10:23 AM 
 
4. Enter the last four digits of your Social Security Number (xxxx) and Date of Birth (mm/dd/yyyy) as indicated and click Submit. You will be taken to the next sign-up page. 5. Create a [employer] ID. This will be your [employer] login ID and cannot be changed, so think of one that is secure and easy to remember. 6. Create a [employer] password. You can change your password at any time. 7. Enter your Password Reset Question and Answer. This can be used at a later time if you forget your password. 8. Enter your e-mail address. You will receive e-mail notification when new information is available in [address] 9. Click Sign Up. You can now view and download portions of your medical record. 10. Click the Download Summary menu link to download a portable copy of your medical information. If you have questions, please visit the Frequently Asked Questions section of the [employer] website. Remember, [employer] is NOT to be used for urgent needs. For medical emergencies, dial 911. Now available from your iPhone and Android! Please provide this summary of care documentation to your next provider. Your primary care clinician is listed as Edwin Mcintosh. If you have any questions after today's visit, please call [phone].

## 2018-03-20 ENCOUNTER — TELEPHONE (OUTPATIENT)
Dept: ORTHOPEDIC SURGERY | Facility: CLINIC | Age: 63
End: 2018-03-20

## 2018-03-20 NOTE — PROGRESS NOTES
The patient presents to the office today with the chief complaint of sinus congestion and for a pre op evaluation    HPI    Mary Franz complains of severe left knee pain. he is now scheduled for surgical correction. Other than his knee pain the patient has complaints of mild sinus congestion which is markedly improved. Orthopedics recently prescribed Levaquin due to WBC in his urine. The patient denies dysuria, chest pain or dyspnea. Review of Systems   Respiratory: Negative for shortness of breath. Cardiovascular: Negative for chest pain and leg swelling. Musculoskeletal: Positive for joint pain. Allergies   Allergen Reactions    Augmentin [Amoxicillin-Pot Clavulanate] Itching    Hibiclens [Chlorhexidine Gluconate] Itching    Penicillins Rash       Current Outpatient Prescriptions   Medication Sig Dispense Refill    levoFLOXacin (LEVAQUIN) 750 mg tablet Take 750 mg by mouth daily. Daily for 5 days  0    LORATADINE/PSEUDOEPHEDRINE (CLARITIN-D 24 HOUR PO) Take  by mouth daily.  L. gasseri-B. bifidum-B longum ([facility]) 1.5 billion cell cap Take  by mouth daily.  azelastine (ASTELIN) 137 mcg (0.1 %) nasal spray 1 spray up each nostril twice per day 1 Bottle 1    warfarin (COUMADIN) 5 mg tablet TAKE 2 AND 1/2 TABLETS BY MOUTH DAILY OR AS DIRECTED (Patient taking differently: TAKE 2 AND 1/2 TABLETS BY MOUTH DAILY OR AS DIRECTED   taking 2 tablets  Monday, Thursday and Saturday and take 2 1/2 tablets all other days) 75 Tab 0    metaxalone (SKELAXIN) 800 mg tablet Take 1 Tab by mouth three (3) times daily. Indications: Muscle Spasm (Patient taking differently: Take 800 mg by mouth three (3) times daily as needed.  Indications: Muscle Spasm) 90 Tab 2    montelukast (SINGULAIR) 10 mg tablet TAKE 1 TABLET BY MOUTH EVERY DAY (Patient taking differently: TAKE 1 TABLET BY MOUTH EVERY HS) 90 Tab 0    butalbital-acetaminophen-caff (FIORICET) -40 mg per capsule Take 2 capsules every 8 hours as needed for headache 540 Cap 3    nystatin (MYCOSTATIN) topical cream Apply  to affected area two (2) times a day. (Patient taking differently: Apply  to affected area two (2) times daily as needed.) 15 g 0    lisinopril-hydroCHLOROthiazide (PRINZIDE, ZESTORETIC) 20-12.5 mg per tablet Take 1 Tab by mouth daily. 90 Tab 1    red yeast rice extract 600 mg cap Take 1,200 mg by mouth daily.  labetalol (NORMODYNE) 100 mg tablet TAKE ONE TABLET BY MOUTH TWICE DAILY 180 Tab 0    lansoprazole (PREVACID) 30 mg capsule TAKE 1 CAPSULE DAILY BEFOREBREAKFAST 90 Cap 3    raNITIdine (ZANTAC) 150 mg tablet TK 1 T PO HS  5    acetaminophen (TYLENOL) 500 mg tablet Take 1,000 mg by mouth every six (6) hours as needed for Pain.       predniSONE (DELTASONE) 20 mg tablet 2 tabs daily x 2 days, 1 tab daily x 2 days, 1/2 tab daily x 4 days 8 Tab 0       Past Medical History:   Diagnosis Date    Arthritis     Bleeding     Blood clot in the legs     Chronic pain     knee and shoulder    Esophageal reflux     GERD (gastroesophageal reflux disease)     Headache(784.0)     migraine    High cholesterol     Hypertension     Lower back pain 11/6/2010    Other chest pain     Personal history of venous thrombosis and embolism     Pure hypercholesterolemia     Right buttock pain 11/6/2010    Right foot pain     Rotator cuff tear     left-since 2010, worsened tear Jan.    Spinal stenosis     Tendonitis, tibialis     anterior    Thromboembolus ([facility] [address]     3 after sx last one 2000       Past Surgical History:   Procedure Laterality Date    FOOT/TOES SURGERY PROC UNLISTED      HX BACK SURGERY      HX ORTHOPAEDIC  06-25-12    Right foot with excision of bursa and adipose tissue from fifth metatarsal base by Dr. Castelan Host      lower back (1992 and 2000)    HX OTHER SURGICAL      left foot (2008)    LAMINOTOMY      NERVE BLOCK         Social History     Social History    Marital status:      Spouse name: N/A    Number of children: N/A    Years of education: N/A     Occupational History    Not on file. Social History Main Topics    Smoking status: Never Smoker    Smokeless tobacco: Never Used    Alcohol use No    Drug use: No    Sexual activity: Not Currently     Other Topics Concern    Not on file     Social History Narrative       Patient does not have an advanced directive on file    Visit Vitals    /80 (BP 1 Location: Right arm, BP Patient Position: Sitting)    Pulse 77    Temp 98.9 °F (37.2 °C) (Tympanic)    Resp 20    Ht 5' 10\" (1.778 m)    Wt 220 lb (99.8 kg)    SpO2 96%    BMI 31.57 kg/m2       Physical Exam   No Cervical Lymphadenopathy  No Supraclavicular Lymphadenopathy  Thyroid is Normal  Lungs are normal to percussion. Clear to auscultation   Heart:  S1 S2 are normal, No gallops, No mummers  No Carotid Bruits  Abdomen:  Normal Bowel Sounds. No tenderness. No masses. No Hepatomegaly or Splenomegly  LE:  Strong Pedal Pulses. No Edema      BMI:  BMI is high but it was not addressed during this visit due to up coming surgery      Hospital Outpatient Visit on 03/16/2018   Component Date Value Ref Range Status    WBC 03/16/2018 8.5  4.6 - 13.2 K/uL Final    RBC 03/16/2018 4.48* 4.70 - 5.50 M/uL Final    HGB 03/16/2018 14.7  13.0 - 16.0 g/dL Final    HCT 03/16/2018 42.3  36.0 - 48.0 % Final    MCV 03/16/2018 94.4  74.0 - 97.0 FL Final    MCH 03/16/2018 32.8  24.0 - 34.0 PG Final    MCHC 03/16/2018 34.8  31.0 - 37.0 g/dL Final    RDW 03/16/2018 13.0  11.6 - 14.5 % Final    PLATELET 87/17/7219 856  135 - 420 K/uL Final    MPV 03/16/2018 11.3  9.2 - 11.8 FL Final    NEUTROPHILS 03/16/2018 71  40 - 73 % Final    LYMPHOCYTES 03/16/2018 17* 21 - 52 % Final    MONOCYTES 03/16/2018 11* 3 - 10 % Final    EOSINOPHILS 03/16/2018 1  0 - 5 % Final    BASOPHILS 03/16/2018 0  0 - 2 % Final    ABS.  NEUTROPHILS 03/16/2018 6.0  1.8 - 8.0 K/UL Final  ABS. LYMPHOCYTES 03/16/2018 1.5  0.9 - 3.6 K/UL Final    ABS. MONOCYTES 03/16/2018 0.9  0.05 - 1.2 K/UL Final    ABS. EOSINOPHILS 03/16/2018 0.1  0.0 - 0.4 K/UL Final    ABS. BASOPHILS 03/16/2018 0.0  0.0 - 0.06 K/UL Final    DF 03/16/2018 AUTOMATED    Final    Prothrombin time 03/16/2018 23.5* 11.5 - 15.2 sec Final    INR 03/16/2018 2.2* 0.8 - 1.2   Final    Comment:            INR Therapeutic Ranges         (on stable oral anticoagulant):     INDICATION                INR  DVT/PE/Atrial Fib          2.0-3.0  MI/Mechanical Heart Valve  2.5-3.5      aPTT 03/16/2018 32.2  23.0 - 36.4 SEC Final    Sodium 03/16/2018 142  136 - 145 mmol/L Final    Potassium 03/16/2018 4.0  3.5 - 5.5 mmol/L Final    Chloride 03/16/2018 104  100 - 108 mmol/L Final    CO2 03/16/2018 30  21 - 32 mmol/L Final    Anion gap 03/16/2018 8  3.0 - 18 mmol/L Final    Glucose 03/16/2018 126* 74 - 99 mg/dL Final    BUN 03/16/2018 16  7.0 - 18 MG/DL Final    Creatinine 03/16/2018 0.98  0.6 - 1.3 MG/DL Final    BUN/Creatinine ratio 03/16/2018 16  12 - 20   Final    GFR est AA 03/16/2018 >60  >60 ml/min/1.73m2 Final    GFR est non-AA 03/16/2018 >60  >60 ml/min/1.73m2 Final    Comment: (NOTE)  Estimated GFR is calculated using the Modification of Diet in Renal   Disease (MDRD) Study equation, reported for both  Americans   (GFRAA) and non- Americans (GFRNA), and normalized to 1.73m2   body surface area. The physician must decide which value applies to   the patient. The MDRD study equation should only be used in   individuals age 25 or older. It has not been validated for the   following: pregnant women, patients with serious comorbid conditions,   or on certain medications, or persons with extremes of body size,   muscle mass, or nutritional status.       Calcium 03/16/2018 9.1  8.5 - 10.1 MG/DL Final    Crossmatch Expiration 03/16/2018 03/30/2018   Final    ABO/Rh(D) 03/16/2018 B POSITIVE   Final    Antibody screen 03/16/2018 NEG   Final    Color 03/16/2018 YELLOW    Final    Appearance 03/16/2018 CLEAR    Final    Specific gravity 03/16/2018 1.015  1.005 - 1.030   Final    pH (UA) 03/16/2018 6.5  5.0 - 8.0   Final    Protein 03/16/2018 NEGATIVE   NEG mg/dL Final    Glucose 03/16/2018 NEGATIVE   NEG mg/dL Final    Ketone 03/16/2018 NEGATIVE   NEG mg/dL Final    Bilirubin 03/16/2018 NEGATIVE   NEG   Final    Blood 03/16/2018 NEGATIVE   NEG   Final    Urobilinogen 03/16/2018 0.2  0.2 - 1.0 EU/dL Final    Nitrites 03/16/2018 NEGATIVE   NEG   Final    Leukocyte Esterase 03/16/2018 TRACE* NEG   Final    Special Requests: 03/16/2018 NO SPECIAL REQUESTS    Final    Culture result: 03/16/2018 NO GROWTH 1 DAY    Final    WBC 03/16/2018 0 to 2  0 - 4 /hpf Final    RBC 03/16/2018 NONE  0 - 5 /hpf Final    Epithelial cells 03/16/2018 FEW  0 - 5 /lpf Final    Bacteria 03/16/2018 NEGATIVE   NEG /hpf Final   Office Visit on 02/19/2018   Component Date Value Ref Range Status    VALID INTERNAL CONTROL POC 02/19/2018 Yes   Final    QuickVue Influenza test 02/19/2018 Positive  Negative Final    Influenza A    VALID INTERNAL CONTROL POC 02/19/2018 Yes   Final    QuickVue Influenza test 02/19/2018 Negative  Negative Final    Influenza B   Hospital Outpatient Visit on 01/31/2018   Component Date Value Ref Range Status    WBC 01/31/2018 7.3  4.6 - 13.2 K/uL Final    RBC 01/31/2018 4.42* 4.70 - 5.50 M/uL Final    HGB 01/31/2018 14.7  13.0 - 16.0 g/dL Final    HCT 01/31/2018 42.9  36.0 - 48.0 % Final    MCV 01/31/2018 97.1* 74.0 - 97.0 FL Final    MCH 01/31/2018 33.3  24.0 - 34.0 PG Final    MCHC 01/31/2018 34.3  31.0 - 37.0 g/dL Final    RDW 01/31/2018 12.5  11.6 - 14.5 % Final    PLATELET 10/15/1685 686  135 - 420 K/uL Final    MPV 01/31/2018 11.3  9.2 - 11.8 FL Final    NEUTROPHILS 01/31/2018 66  40 - 73 % Final    LYMPHOCYTES 01/31/2018 20* 21 - 52 % Final    MONOCYTES 01/31/2018 12* 3 - 10 % Final    EOSINOPHILS 01/31/2018 2  0 - 5 % Final    BASOPHILS 01/31/2018 0  0 - 2 % Final    ABS. NEUTROPHILS 01/31/2018 4.8  1.8 - 8.0 K/UL Final    ABS. LYMPHOCYTES 01/31/2018 1.4  0.9 - 3.6 K/UL Final    ABS. MONOCYTES 01/31/2018 0.9  0.05 - 1.2 K/UL Final    ABS. EOSINOPHILS 01/31/2018 0.2  0.0 - 0.4 K/UL Final    ABS. BASOPHILS 01/31/2018 0.0  0.0 - 0.06 K/UL Final    DF 01/31/2018 AUTOMATED    Final    Prothrombin time 01/31/2018 17.0* 11.5 - 15.2 sec Final    INR 01/31/2018 1.4* 0.8 - 1.2   Final    Comment:            INR Therapeutic Ranges         (on stable oral anticoagulant):     INDICATION                INR  DVT/PE/Atrial Fib          2.0-3.0  MI/Mechanical Heart Valve  2.5-3.5      aPTT 01/31/2018 31.3  23.0 - 36.4 SEC Final    Sodium 01/31/2018 140  136 - 145 mmol/L Final    Potassium 01/31/2018 4.1  3.5 - 5.5 mmol/L Final    Chloride 01/31/2018 103  100 - 108 mmol/L Final    CO2 01/31/2018 30  21 - 32 mmol/L Final    Anion gap 01/31/2018 7  3.0 - 18 mmol/L Final    Glucose 01/31/2018 89  74 - 99 mg/dL Final    BUN 01/31/2018 12  7.0 - 18 MG/DL Final    Creatinine 01/31/2018 0.92  0.6 - 1.3 MG/DL Final    BUN/Creatinine ratio 01/31/2018 13  12 - 20   Final    GFR est AA 01/31/2018 >60  >60 ml/min/1.73m2 Final    GFR est non-AA 01/31/2018 >60  >60 ml/min/1.73m2 Final    Comment: (NOTE)  Estimated GFR is calculated using the Modification of Diet in Renal   Disease (MDRD) Study equation, reported for both  Americans   (GFRAA) and non- Americans (GFRNA), and normalized to 1.73m2   body surface area. The physician must decide which value applies to   the patient. The MDRD study equation should only be used in   individuals age 25 or older. It has not been validated for the   following: pregnant women, patients with serious comorbid conditions,   or on certain medications, or persons with extremes of body size,   muscle mass, or nutritional status.       Calcium 01/31/2018 9.3 8.5 - 10.1 MG/DL Final    Albumin 01/31/2018 3.9  3.4 - 5.0 g/dL Final    Hemoglobin A1c 01/31/2018 5.8* 4.2 - 5.6 % Final    Comment: (NOTE)  HbA1C Interpretive Ranges  <5.7              Normal  5.7 - 6.4         Consider Prediabetes  >6.5              Consider Diabetes      Est. average glucose 01/31/2018 120  mg/dL Final    Comment: (NOTE)  The eAG should be interpreted with patient characteristics in mind   since ethnicity, interindividual differences, red cell lifespan,   variation in rates of glycation, etc. may affect the validity of the   calculation.       Crossmatch Expiration 01/31/2018 02/14/2018   Final    ABO/Rh(D) 01/31/2018 B POSITIVE   Final    Antibody screen 01/31/2018 NEG   Final    Color 01/31/2018 YELLOW    Final    Appearance 01/31/2018 CLEAR    Final    Specific gravity 01/31/2018 1.012  1.005 - 1.030   Final    pH (UA) 01/31/2018 7.0  5.0 - 8.0   Final    Protein 01/31/2018 NEGATIVE   NEG mg/dL Final    Glucose 01/31/2018 NEGATIVE   NEG mg/dL Final    Ketone 01/31/2018 NEGATIVE   NEG mg/dL Final    Bilirubin 01/31/2018 NEGATIVE   NEG   Final    Blood 01/31/2018 NEGATIVE   NEG   Final    Urobilinogen 01/31/2018 0.2  0.2 - 1.0 EU/dL Final    Nitrites 01/31/2018 NEGATIVE   NEG   Final    Leukocyte Esterase 01/31/2018 NEGATIVE   NEG   Final    Special Requests: 01/31/2018 NO SPECIAL REQUESTS    Final    Culture result: 01/31/2018 NO GROWTH 1 DAY    Final    Ventricular Rate 01/31/2018 62  BPM Final    Atrial Rate 01/31/2018 62  BPM Final    P-R Interval 01/31/2018 180  ms Final    QRS Duration 01/31/2018 86  ms Final    Q-T Interval 01/31/2018 394  ms Final    QTC Calculation (Bezet) 01/31/2018 399  ms Final    Calculated P Axis 01/31/2018 58  degrees Final    Calculated R Axis 01/31/2018 12  degrees Final    Calculated T Axis 01/31/2018 64  degrees Final    Diagnosis 01/31/2018    Final                    Value:Normal sinus rhythm  Possible Left atrial enlargement  Cannot exclude Inferior infarct , age undetermined  Nonspecific ST and T wave abnormality Lateral leads  Borderline ECG  When compared with ECG of 11-JUN-2012 12:04,  No significant change was found  Confirmed by Asuncion Benson ([id number]) on 2/1/2018 7:08:50 AM     Hospital Outpatient Visit on 01/24/2018   Component Date Value Ref Range Status    Creatinine, POC 01/24/2018 0.9  0.6 - 1.3 MG/DL Final    GFRAA, POC 01/24/2018 >60  >60 ml/min/1.73m2 Final    GFRNA, POC 01/24/2018 >60  >60 ml/min/1.73m2 Final    Comment: Estimated GFR is calculated using the IDMS-traceable Modification of Diet in Renal Disease (MDRD) Study equation, reported for both  Americans (GFRAA) and non- Americans (GFRNA), and normalized to 1.73m2 body surface area. The physician must decide which value applies to the patient. The MDRD study equation should only be used in individuals age 25 or older. It has not been validated for the following: pregnant women, patients with serious comorbid conditions, or on certain medications, or persons with extremes of body size, muscle mass, or nutritional status. Office Visit on 01/18/2018   Component Date Value Ref Range Status    VALID INTERNAL CONTROL POC 01/18/2018 Yes   Final    INR POC 01/18/2018 2.1   Final   Anti-Coag visit on 01/02/2018   Component Date Value Ref Range Status    VALID INTERNAL CONTROL POC 01/02/2018 Yes   Final    INR POC 01/02/2018 2.1   Final       .No results found for any visits on 03/19/18. Pre op testing:  EKG (1/31/18): Left atrial enlargement. Small inferior wall Q waves of questionable significance                           CXR: (1/31/18): Normal                           Labs (3/16/18): Normal    Assessment / Plan      ICD-10-CM ICD-9-CM    1. Primary osteoarthritis of left knee M17.12 715.16    2. Essential hypertension I10 401.9    3.  Acute URI J06.9 465.9        THE PATIENT IS OK FOR SURGERY    PRN Coumadin  The Coumadin needs to be held 5 days pre op. Please let me know if I am needed to order bridging Lovenox  OK to hold the other medications on the day of surgery    Follow-up Disposition:  Return in about 3 months (around 6/19/2018). I asked Nicola Hernández if he has any questions and I answered the questions.   Nicola Hernández states that he understands the treatment plan and agrees with the treatment plan

## 2018-03-20 NOTE — TELEPHONE ENCOUNTER
Mr. Paul Gonzalez is having a Lt MENA (R/S from 2/12/18) on Tues 3/27/18. His H & P appointment will be on Thurs 3/22/18 and his wife called asking when the Lovenox will be called in so he can stop taking his coumadin.  Please call her at [phone]

## 2018-03-21 DIAGNOSIS — N39.0 URINARY TRACT INFECTION WITHOUT HEMATURIA, SITE UNSPECIFIED: Primary | ICD-10-CM

## 2018-03-21 NOTE — TELEPHONE ENCOUNTER
Contacted pt wife at her home number. Informed her that Coumadin will be stopped tomorrow and a prescription for lovenox will be given at the appointment tomorrow.

## 2018-03-22 ENCOUNTER — HOSPITAL ENCOUNTER (OUTPATIENT)
Dept: PREADMISSION TESTING | Age: 63
Discharge: HOME | End: 2018-03-22
Payer: COMMERCIAL

## 2018-03-22 ENCOUNTER — OFFICE VISIT (OUTPATIENT)
Dept: ORTHOPEDIC SURGERY | Facility: CLINIC | Age: 63
End: 2018-03-22

## 2018-03-22 VITALS
BODY MASS INDEX: 32.24 KG/M2 | HEART RATE: 79 BPM | DIASTOLIC BLOOD PRESSURE: 75 MMHG | OXYGEN SATURATION: 97 % | WEIGHT: 225.2 LBS | SYSTOLIC BLOOD PRESSURE: 140 MMHG | TEMPERATURE: 97.2 F | HEIGHT: 70 IN

## 2018-03-22 DIAGNOSIS — M17.12 PRIMARY OSTEOARTHRITIS OF LEFT KNEE: Primary | ICD-10-CM

## 2018-03-22 DIAGNOSIS — N39.0 URINARY TRACT INFECTION WITHOUT HEMATURIA, SITE UNSPECIFIED: ICD-10-CM

## 2018-03-22 DIAGNOSIS — Z01.818 PRE-OP EVALUATION: ICD-10-CM

## 2018-03-22 LAB
APPEARANCE UR: CLEAR
BILIRUB UR QL: NEGATIVE
COLOR UR: YELLOW
GLUCOSE UR STRIP.AUTO-MCNC: NEGATIVE MG/DL
HGB UR QL STRIP: NEGATIVE
KETONES UR QL STRIP.AUTO: NEGATIVE MG/DL
LEUKOCYTE ESTERASE UR QL STRIP.AUTO: NEGATIVE
NITRITE UR QL STRIP.AUTO: NEGATIVE
PH UR STRIP: 6.5 [PH] (ref 5–8)
PROT UR STRIP-MCNC: NEGATIVE MG/DL
SP GR UR REFRACTOMETRY: 1.01 (ref 1–1.03)
UROBILINOGEN UR QL STRIP.AUTO: 0.2 EU/DL (ref 0.2–1)

## 2018-03-22 PROCEDURE — 81003 URINALYSIS AUTO W/O SCOPE: CPT | Performed by: ORTHOPAEDIC SURGERY

## 2018-03-22 PROCEDURE — 87086 URINE CULTURE/COLONY COUNT: CPT | Performed by: ORTHOPAEDIC SURGERY

## 2018-03-22 PROCEDURE — 36415 COLL VENOUS BLD VENIPUNCTURE: CPT | Performed by: ORTHOPAEDIC SURGERY

## 2018-03-22 RX ORDER — WARFARIN 1 MG/1
10 TABLET ORAL ONCE
Status: CANCELLED | OUTPATIENT
Start: 2018-03-22 | End: 2018-03-22

## 2018-03-22 RX ORDER — ACETAMINOPHEN 325 MG/1
1000 TABLET ORAL ONCE
Status: CANCELLED | OUTPATIENT
Start: 2018-03-22 | End: 2018-03-22

## 2018-03-22 RX ORDER — ENOXAPARIN SODIUM 100 MG/ML
40 INJECTION SUBCUTANEOUS DAILY
Qty: 4 SYRINGE | Refills: 0 | Status: SHIPPED | OUTPATIENT
Start: 2018-03-22 | End: 2018-03-28

## 2018-03-22 RX ORDER — PREGABALIN 25 MG/1
75 CAPSULE ORAL ONCE
Status: CANCELLED | OUTPATIENT
Start: 2018-03-22 | End: 2018-03-22

## 2018-03-22 NOTE — H&P
[address], [address] [facility]  [phone]           HISTORY & PHYSICAL      Patient: Shayy Lin                MRN: [id number]       SSN: [id number]  YOB: 1955        AGE: 57 y.o. SEX: male  Body mass index is 32.31 kg/(m^2). PCP: Lacey Lin MD  03/22/18      CC: Left  knee end stage OA  Problem List Items Addressed This Visit        Other    Primary osteoarthritis of left knee - Primary      Other Visit Diagnoses     Pre-op evaluation                HPI:  The patient is a pleasant 57 y.o. whom has end stage OA of their Left knee and has failed conservative treatment including but not limited to NSAIDS, cortisone injections, viscosupplementation, PT, and pain medicine. Due to the current findings and affected activities of daily living, surgical intervention is indicated. The alternatives, risks, complications, as well as expected outcome were discussed. These include but are not limited to infection, blood loss, need for blood transfusion, neurovascular damage, DVT, PE,  post-op stiffness and pain, leg length discrepancy, dislocation, anesthetic complications, prothesis longevity, need for more surgery, MI, stroke, and even death. The patient understands and wishes to proceed with surgery.       Past Medical History:   Diagnosis Date    Arthritis     Bleeding     Blood clot in the legs     Chronic pain     knee and shoulder    Esophageal reflux     GERD (gastroesophageal reflux disease)     Headache(784.0)     migraine    High cholesterol     Hypertension     Lower back pain 11/6/2010    Other chest pain     Personal history of venous thrombosis and embolism     Pure hypercholesterolemia     Right buttock pain 11/6/2010    Right foot pain     Rotator cuff tear     left-since 2010, worsened tear Young.    Spinal stenosis     Tendonitis, tibialis     anterior    Thromboembolus ([facility] [address]     3 after sx last one 2000         Current Outpatient Prescriptions:     levoFLOXacin (LEVAQUIN) 750 mg tablet, Take 750 mg by mouth daily. Daily for 5 days, Disp: , Rfl: 0    LORATADINE/PSEUDOEPHEDRINE (CLARITIN-D 24 HOUR PO), Take  by mouth daily. , Disp: , Rfl:     L. gasseri-B. bifidum-B longum ([facility]) 1.5 billion cell cap, Take  by mouth daily. , Disp: , Rfl:     azelastine (ASTELIN) 137 mcg (0.1 %) nasal spray, 1 spray up each nostril twice per day, Disp: 1 Bottle, Rfl: 1    warfarin (COUMADIN) 5 mg tablet, TAKE 2 AND 1/2 TABLETS BY MOUTH DAILY OR AS DIRECTED (Patient taking differently: TAKE 2 AND 1/2 TABLETS BY MOUTH DAILY OR AS DIRECTED   taking 2 tablets  Monday, Thursday and Saturday and take 2 1/2 tablets all other days), Disp: 75 Tab, Rfl: 0    metaxalone (SKELAXIN) 800 mg tablet, Take 1 Tab by mouth three (3) times daily. Indications: Muscle Spasm (Patient taking differently: Take 800 mg by mouth three (3) times daily as needed. Indications: Muscle Spasm), Disp: 90 Tab, Rfl: 2    montelukast (SINGULAIR) 10 mg tablet, TAKE 1 TABLET BY MOUTH EVERY DAY (Patient taking differently: TAKE 1 TABLET BY MOUTH EVERY HS), Disp: 90 Tab, Rfl: 0    butalbital-acetaminophen-caff (FIORICET) -40 mg per capsule, Take 2 capsules every 8 hours as needed for headache, Disp: 540 Cap, Rfl: 3    nystatin (MYCOSTATIN) topical cream, Apply  to affected area two (2) times a day. (Patient taking differently: Apply  to affected area two (2) times daily as needed.), Disp: 15 g, Rfl: 0    lisinopril-hydroCHLOROthiazide (PRINZIDE, ZESTORETIC) 20-12.5 mg per tablet, Take 1 Tab by mouth daily. , Disp: 90 Tab, Rfl: 1    red yeast rice extract 600 mg cap, Take 1,200 mg by mouth daily. , Disp: , Rfl:     labetalol (NORMODYNE) 100 mg tablet, TAKE ONE TABLET BY MOUTH TWICE DAILY, Disp: 180 Tab, Rfl: 0    lansoprazole (PREVACID) 30 mg capsule, TAKE 1 CAPSULE DAILY BEFOREBREAKFAST, Disp: 90 Cap, Rfl: 3   raNITIdine (ZANTAC) 150 mg tablet, TK 1 T PO HS, Disp: , Rfl: 5    acetaminophen (TYLENOL) 500 mg tablet, Take 1,000 mg by mouth every six (6) hours as needed for Pain., Disp: , Rfl:     predniSONE (DELTASONE) 20 mg tablet, 2 tabs daily x 2 days, 1 tab daily x 2 days, 1/2 tab daily x 4 days, Disp: 8 Tab, Rfl: 0    Allergies   Allergen Reactions    Augmentin [Amoxicillin-Pot Clavulanate] Itching    Hibiclens [Chlorhexidine Gluconate] Itching    Penicillins Rash       Social History     Social History    Marital status:      Spouse name: N/A    Number of children: N/A    Years of education: N/A     Occupational History    Not on file. Social History Main Topics    Smoking status: Never Smoker    Smokeless tobacco: Never Used    Alcohol use No    Drug use: No    Sexual activity: Not Currently     Other Topics Concern    Not on file     Social History Narrative       Past Surgical History:   Procedure Laterality Date    FOOT/TOES SURGERY PROC UNLISTED      HX BACK SURGERY      HX ORTHOPAEDIC  06-25-12    Right foot with excision of bursa and adipose tissue from fifth metatarsal base by Dr. Fabiola Preciado      lower back (1992 and 2000)    HX OTHER SURGICAL      left foot (2008)    LAMINOTOMY      NERVE BLOCK         Family History:  Non-contributory. PE:  Visit Vitals    /75 (BP 1 Location: Left arm, BP Patient Position: Sitting)    Pulse 79    Temp 97.2 °F (36.2 °C) (Oral)    Ht 5' 10\" (1.778 m)    Wt 225 lb 3.2 oz (102.2 kg)    SpO2 97%    BMI 32.31 kg/m2     A&O X3, NAD, well develop, well nourished  Heart: S1-S2, rrr  Lungs: CTA bilat  Abd: soft, nt, nt, + bs in all quadrants  Ext:  Pos distal pulses to DP, PT    X-ray: left knee shows end stage OA    Labs: labs were reviewed and wnl.  ua pos. Finished Levaquin yesterday    A:  Left  knee end stage OA    P:  At this point we will move forward with surgery.   Again, the alternatives, risks, complications, as well as expected outcome were discussed and the patient wishes to proceed with surgery. Pt has been instructed to stop aspirin, nsaids, rheumatologic medications and blood thinners. They have also been instructed to continue on any heart and bp meds and to take them the morning of surgery with sips of water. Will bridge coumadin with lovenox 40 mg. Will stop morning before surgery. The patient will require in patient admission due to above stated medical conditions as well the the surgical challenges given the anatomy and bone quality.          Nichol Santos

## 2018-03-22 NOTE — PROGRESS NOTES
History and Physical Performed today and documented in chart    Angelo [place], [place]  3/22/2018.  9:13 AM

## 2018-03-23 LAB
BACTERIA SPEC CULT: NORMAL
SERVICE CMNT-IMP: NORMAL

## 2018-03-26 ENCOUNTER — ANESTHESIA EVENT (OUTPATIENT)
Dept: SURGERY | Age: 63
DRG: 470 | End: 2018-03-26
Payer: COMMERCIAL

## 2018-03-26 RX ORDER — LABETALOL 100 MG/1
TABLET, FILM COATED ORAL
Qty: 180 TAB | Refills: 0 | Status: SHIPPED | OUTPATIENT
Start: 2018-03-26 | End: 2018-10-03 | Stop reason: SDUPTHER

## 2018-03-26 RX ORDER — LISINOPRIL AND HYDROCHLOROTHIAZIDE 12.5; 2 MG/1; MG/1
TABLET ORAL
Qty: 90 TAB | Refills: 0 | Status: SHIPPED | OUTPATIENT
Start: 2018-03-26 | End: 2018-07-02 | Stop reason: SDUPTHER

## 2018-03-27 ENCOUNTER — ANESTHESIA (OUTPATIENT)
Dept: SURGERY | Age: 63
DRG: 470 | End: 2018-03-27
Payer: COMMERCIAL

## 2018-03-27 ENCOUNTER — HOSPITAL ENCOUNTER (INPATIENT)
Age: 63
LOS: 1 days | Discharge: HOME HEALTH | DRG: 470 | End: 2018-03-28
Attending: ORTHOPAEDIC SURGERY | Admitting: ORTHOPAEDIC SURGERY
Payer: COMMERCIAL

## 2018-03-27 ENCOUNTER — APPOINTMENT (OUTPATIENT)
Dept: GENERAL RADIOLOGY | Age: 63
DRG: 470 | End: 2018-03-27
Attending: ORTHOPAEDIC SURGERY
Payer: COMMERCIAL

## 2018-03-27 DIAGNOSIS — M17.10 ARTHRITIS OF KNEE: Primary | ICD-10-CM

## 2018-03-27 LAB — INR BLD: 1 (ref 0.9–1.2)

## 2018-03-27 PROCEDURE — 77030018836 HC SOL IRR NACL ICUM -A: Performed by: ORTHOPAEDIC SURGERY

## 2018-03-27 PROCEDURE — C1776 JOINT DEVICE (IMPLANTABLE): HCPCS | Performed by: ORTHOPAEDIC SURGERY

## 2018-03-27 PROCEDURE — 74011250636 HC RX REV CODE- 250/636: Performed by: ANESTHESIOLOGY

## 2018-03-27 PROCEDURE — 77030012935 HC DRSG AQUACEL BMS -B: Performed by: ORTHOPAEDIC SURGERY

## 2018-03-27 PROCEDURE — 77030019605: Performed by: ORTHOPAEDIC SURGERY

## 2018-03-27 PROCEDURE — 74011250636 HC RX REV CODE- 250/636: Performed by: NURSE ANESTHETIST, CERTIFIED REGISTERED

## 2018-03-27 PROCEDURE — 77030008683 HC TU ET CUF COVD -A: Performed by: ANESTHESIOLOGY

## 2018-03-27 PROCEDURE — 74011000250 HC RX REV CODE- 250

## 2018-03-27 PROCEDURE — 74011250636 HC RX REV CODE- 250/636: Performed by: ORTHOPAEDIC SURGERY

## 2018-03-27 PROCEDURE — 74011250637 HC RX REV CODE- 250/637: Performed by: ORTHOPAEDIC SURGERY

## 2018-03-27 PROCEDURE — 77030003601 HC NDL NRV BLK BBMI -A: Performed by: ORTHOPAEDIC SURGERY

## 2018-03-27 PROCEDURE — 74011000258 HC RX REV CODE- 258

## 2018-03-27 PROCEDURE — 74011000250 HC RX REV CODE- 250: Performed by: NURSE ANESTHETIST, CERTIFIED REGISTERED

## 2018-03-27 PROCEDURE — C1713 ANCHOR/SCREW BN/BN,TIS/BN: HCPCS | Performed by: ORTHOPAEDIC SURGERY

## 2018-03-27 PROCEDURE — 76010000153 HC OR TIME 1.5 TO 2 HR: Performed by: ORTHOPAEDIC SURGERY

## 2018-03-27 PROCEDURE — 64447 NJX AA&/STRD FEMORAL NRV IMG: CPT | Performed by: ANESTHESIOLOGY

## 2018-03-27 PROCEDURE — 74011000258 HC RX REV CODE- 258: Performed by: ORTHOPAEDIC SURGERY

## 2018-03-27 PROCEDURE — 0SRD0J9 REPLACEMENT OF LEFT KNEE JOINT WITH SYNTHETIC SUBSTITUTE, CEMENTED, OPEN APPROACH: ICD-10-PCS | Performed by: ORTHOPAEDIC SURGERY

## 2018-03-27 PROCEDURE — 97116 GAIT TRAINING THERAPY: CPT

## 2018-03-27 PROCEDURE — 77030029494 HC CLD THER UNIT ICMN DJOR -B

## 2018-03-27 PROCEDURE — 77030003029 HC SUT VCRL J&J -B: Performed by: ORTHOPAEDIC SURGERY

## 2018-03-27 PROCEDURE — 77030019908 HC STETH ESOPH SIMS -A: Performed by: ANESTHESIOLOGY

## 2018-03-27 PROCEDURE — 77030012411 HC DRN WND CARD -A: Performed by: ORTHOPAEDIC SURGERY

## 2018-03-27 PROCEDURE — 85610 PROTHROMBIN TIME: CPT

## 2018-03-27 PROCEDURE — 77030020782 HC GWN BAIR PAWS FLX 3M -B: Performed by: ORTHOPAEDIC SURGERY

## 2018-03-27 PROCEDURE — 77030016544 HC BLD SAW RECIP1 STRY -B: Performed by: ORTHOPAEDIC SURGERY

## 2018-03-27 PROCEDURE — 77030013708 HC HNDPC SUC IRR PULS STRY –B: Performed by: ORTHOPAEDIC SURGERY

## 2018-03-27 PROCEDURE — 77030008467 HC STPLR SKN COVD -B: Performed by: ORTHOPAEDIC SURGERY

## 2018-03-27 PROCEDURE — 65270000029 HC RM PRIVATE

## 2018-03-27 PROCEDURE — 77030018883 HC BLD SAW SAG4 STRY -B: Performed by: ORTHOPAEDIC SURGERY

## 2018-03-27 PROCEDURE — 77030011640 HC PAD GRND REM COVD -A: Performed by: ORTHOPAEDIC SURGERY

## 2018-03-27 PROCEDURE — 74011000250 HC RX REV CODE- 250: Performed by: ORTHOPAEDIC SURGERY

## 2018-03-27 PROCEDURE — 76060000034 HC ANESTHESIA 1.5 TO 2 HR: Performed by: ORTHOPAEDIC SURGERY

## 2018-03-27 PROCEDURE — 77030010785: Performed by: ORTHOPAEDIC SURGERY

## 2018-03-27 PROCEDURE — 74011250636 HC RX REV CODE- 250/636

## 2018-03-27 PROCEDURE — 76210000016 HC OR PH I REC 1 TO 1.5 HR: Performed by: ORTHOPAEDIC SURGERY

## 2018-03-27 PROCEDURE — 76942 ECHO GUIDE FOR BIOPSY: CPT | Performed by: ANESTHESIOLOGY

## 2018-03-27 PROCEDURE — 77030020753 HC CUF TRNQT 1BLA STRY -B: Performed by: ORTHOPAEDIC SURGERY

## 2018-03-27 PROCEDURE — 77030019557 HC ELECTRD VES SEAL MEDT -F: Performed by: ORTHOPAEDIC SURGERY

## 2018-03-27 PROCEDURE — 77030018719 HC DRSG PTCH ANTIMIC J&J -A: Performed by: ORTHOPAEDIC SURGERY

## 2018-03-27 PROCEDURE — 77030031139 HC SUT VCRL2 J&J -A: Performed by: ORTHOPAEDIC SURGERY

## 2018-03-27 PROCEDURE — 77030012890: Performed by: ORTHOPAEDIC SURGERY

## 2018-03-27 PROCEDURE — 77030002933 HC SUT MCRYL J&J -A: Performed by: ORTHOPAEDIC SURGERY

## 2018-03-27 PROCEDURE — C9290 INJ, BUPIVACAINE LIPOSOME: HCPCS | Performed by: ORTHOPAEDIC SURGERY

## 2018-03-27 PROCEDURE — 73560 X-RAY EXAM OF KNEE 1 OR 2: CPT

## 2018-03-27 PROCEDURE — 97161 PT EVAL LOW COMPLEX 20 MIN: CPT

## 2018-03-27 PROCEDURE — 3E0T3BZ INTRODUCTION OF ANESTHETIC AGENT INTO PERIPHERAL NERVES AND PLEXI, PERCUTANEOUS APPROACH: ICD-10-PCS | Performed by: ANESTHESIOLOGY

## 2018-03-27 PROCEDURE — 74011250637 HC RX REV CODE- 250/637: Performed by: NURSE ANESTHETIST, CERTIFIED REGISTERED

## 2018-03-27 DEVICE — COMPNT FEM POST STBL 5 L --: Type: IMPLANTABLE DEVICE | Site: KNEE | Status: FUNCTIONAL

## 2018-03-27 DEVICE — INSERT TIB PS TRIATHLN X3 5 11 --: Type: IMPLANTABLE DEVICE | Site: KNEE | Status: FUNCTIONAL

## 2018-03-27 DEVICE — PAT ASYM TRIATHLN X3 35X10MM -- TRIATHLON ASYMMETRIC X3: Type: IMPLANTABLE DEVICE | Site: KNEE | Status: FUNCTIONAL

## 2018-03-27 DEVICE — COMPONENT KNEE CEM X3 TRIATHLON: Type: IMPLANTABLE DEVICE | Site: KNEE | Status: FUNCTIONAL

## 2018-03-27 DEVICE — BASEPLT TIB UNIV TRIATHLN 5 --: Type: IMPLANTABLE DEVICE | Site: KNEE | Status: FUNCTIONAL

## 2018-03-27 DEVICE — CEMENT BNE 20ML 41GM FULL DOSE PMMA W/ TOBRA M VISC RADPQ: Type: IMPLANTABLE DEVICE | Site: KNEE | Status: FUNCTIONAL

## 2018-03-27 RX ORDER — BUPIVACAINE HYDROCHLORIDE 2.5 MG/ML
30 INJECTION, SOLUTION EPIDURAL; INFILTRATION; INTRACAUDAL ONCE
Status: DISCONTINUED | OUTPATIENT
Start: 2018-03-27 | End: 2018-03-27 | Stop reason: HOSPADM

## 2018-03-27 RX ORDER — OXYCODONE HYDROCHLORIDE 15 MG/1
15 TABLET ORAL
Status: DISCONTINUED | OUTPATIENT
Start: 2018-03-27 | End: 2018-03-28 | Stop reason: HOSPADM

## 2018-03-27 RX ORDER — MIDAZOLAM HYDROCHLORIDE 1 MG/ML
INJECTION, SOLUTION INTRAMUSCULAR; INTRAVENOUS AS NEEDED
Status: DISCONTINUED | OUTPATIENT
Start: 2018-03-27 | End: 2018-03-27 | Stop reason: HOSPADM

## 2018-03-27 RX ORDER — DEXAMETHASONE SODIUM PHOSPHATE 4 MG/ML
INJECTION, SOLUTION INTRA-ARTICULAR; INTRALESIONAL; INTRAMUSCULAR; INTRAVENOUS; SOFT TISSUE AS NEEDED
Status: DISCONTINUED | OUTPATIENT
Start: 2018-03-27 | End: 2018-03-27 | Stop reason: HOSPADM

## 2018-03-27 RX ORDER — LIDOCAINE HYDROCHLORIDE 10 MG/ML
3 INJECTION, SOLUTION EPIDURAL; INFILTRATION; INTRACAUDAL; PERINEURAL ONCE
Status: COMPLETED | OUTPATIENT
Start: 2018-03-27 | End: 2018-03-27

## 2018-03-27 RX ORDER — ONDANSETRON 2 MG/ML
4 INJECTION INTRAMUSCULAR; INTRAVENOUS
Status: DISCONTINUED | OUTPATIENT
Start: 2018-03-27 | End: 2018-03-28 | Stop reason: HOSPADM

## 2018-03-27 RX ORDER — WARFARIN 10 MG/1
10 TABLET ORAL ONCE
Status: COMPLETED | OUTPATIENT
Start: 2018-03-27 | End: 2018-03-27

## 2018-03-27 RX ORDER — FAMOTIDINE 20 MG/1
20 TABLET, FILM COATED ORAL ONCE
Status: COMPLETED | OUTPATIENT
Start: 2018-03-27 | End: 2018-03-27

## 2018-03-27 RX ORDER — LISINOPRIL AND HYDROCHLOROTHIAZIDE 12.5; 2 MG/1; MG/1
1 TABLET ORAL DAILY
Status: DISCONTINUED | OUTPATIENT
Start: 2018-03-28 | End: 2018-03-28 | Stop reason: HOSPADM

## 2018-03-27 RX ORDER — FENTANYL CITRATE 50 UG/ML
INJECTION, SOLUTION INTRAMUSCULAR; INTRAVENOUS AS NEEDED
Status: DISCONTINUED | OUTPATIENT
Start: 2018-03-27 | End: 2018-03-27 | Stop reason: HOSPADM

## 2018-03-27 RX ORDER — LEVOFLOXACIN 500 MG/1
500 TABLET, FILM COATED ORAL EVERY 24 HOURS
Status: DISCONTINUED | OUTPATIENT
Start: 2018-03-27 | End: 2018-03-28 | Stop reason: HOSPADM

## 2018-03-27 RX ORDER — ROCURONIUM BROMIDE 10 MG/ML
INJECTION, SOLUTION INTRAVENOUS AS NEEDED
Status: DISCONTINUED | OUTPATIENT
Start: 2018-03-27 | End: 2018-03-27 | Stop reason: HOSPADM

## 2018-03-27 RX ORDER — DIPHENHYDRAMINE HYDROCHLORIDE 50 MG/ML
12.5 INJECTION, SOLUTION INTRAMUSCULAR; INTRAVENOUS
Status: DISCONTINUED | OUTPATIENT
Start: 2018-03-27 | End: 2018-03-28 | Stop reason: HOSPADM

## 2018-03-27 RX ORDER — VANCOMYCIN HYDROCHLORIDE 1 G/20ML
INJECTION, POWDER, LYOPHILIZED, FOR SOLUTION INTRAVENOUS AS NEEDED
Status: DISCONTINUED | OUTPATIENT
Start: 2018-03-27 | End: 2018-03-27 | Stop reason: HOSPADM

## 2018-03-27 RX ORDER — ACETAMINOPHEN 500 MG
1000 TABLET ORAL 4 TIMES DAILY
Status: DISCONTINUED | OUTPATIENT
Start: 2018-03-27 | End: 2018-03-28 | Stop reason: HOSPADM

## 2018-03-27 RX ORDER — SODIUM CHLORIDE 0.9 % (FLUSH) 0.9 %
5-10 SYRINGE (ML) INJECTION AS NEEDED
Status: DISCONTINUED | OUTPATIENT
Start: 2018-03-27 | End: 2018-03-28 | Stop reason: HOSPADM

## 2018-03-27 RX ORDER — SODIUM CHLORIDE, SODIUM LACTATE, POTASSIUM CHLORIDE, CALCIUM CHLORIDE 600; 310; 30; 20 MG/100ML; MG/100ML; MG/100ML; MG/100ML
75 INJECTION, SOLUTION INTRAVENOUS CONTINUOUS
Status: DISCONTINUED | OUTPATIENT
Start: 2018-03-27 | End: 2018-03-27 | Stop reason: HOSPADM

## 2018-03-27 RX ORDER — AZELASTINE 1 MG/ML
1 SPRAY, METERED NASAL 2 TIMES DAILY
Status: DISCONTINUED | OUTPATIENT
Start: 2018-03-28 | End: 2018-03-28 | Stop reason: HOSPADM

## 2018-03-27 RX ORDER — RANITIDINE 150 MG/1
150 TABLET, FILM COATED ORAL
Status: DISCONTINUED | OUTPATIENT
Start: 2018-03-27 | End: 2018-03-28 | Stop reason: HOSPADM

## 2018-03-27 RX ORDER — BUPIVACAINE HYDROCHLORIDE 5 MG/ML
INJECTION, SOLUTION EPIDURAL; INTRACAUDAL AS NEEDED
Status: DISCONTINUED | OUTPATIENT
Start: 2018-03-27 | End: 2018-03-27 | Stop reason: HOSPADM

## 2018-03-27 RX ORDER — PANTOPRAZOLE SODIUM 40 MG/1
40 TABLET, DELAYED RELEASE ORAL
Status: DISCONTINUED | OUTPATIENT
Start: 2018-03-28 | End: 2018-03-28 | Stop reason: HOSPADM

## 2018-03-27 RX ORDER — MORPHINE SULFATE 4 MG/ML
INJECTION INTRAVENOUS
Status: COMPLETED
Start: 2018-03-27 | End: 2018-03-27

## 2018-03-27 RX ORDER — KETOROLAC TROMETHAMINE 30 MG/ML
INJECTION, SOLUTION INTRAMUSCULAR; INTRAVENOUS AS NEEDED
Status: DISCONTINUED | OUTPATIENT
Start: 2018-03-27 | End: 2018-03-27 | Stop reason: HOSPADM

## 2018-03-27 RX ORDER — SUCCINYLCHOLINE CHLORIDE 20 MG/ML
INJECTION INTRAMUSCULAR; INTRAVENOUS AS NEEDED
Status: DISCONTINUED | OUTPATIENT
Start: 2018-03-27 | End: 2018-03-27 | Stop reason: HOSPADM

## 2018-03-27 RX ORDER — GLYCOPYRROLATE 0.2 MG/ML
INJECTION INTRAMUSCULAR; INTRAVENOUS AS NEEDED
Status: DISCONTINUED | OUTPATIENT
Start: 2018-03-27 | End: 2018-03-27 | Stop reason: HOSPADM

## 2018-03-27 RX ORDER — EPINEPHRINE 1 MG/ML
INJECTION, SOLUTION, CONCENTRATE INTRAVENOUS AS NEEDED
Status: DISCONTINUED | OUTPATIENT
Start: 2018-03-27 | End: 2018-03-27 | Stop reason: HOSPADM

## 2018-03-27 RX ORDER — ACETAMINOPHEN 500 MG
1000 TABLET ORAL ONCE
Status: COMPLETED | OUTPATIENT
Start: 2018-03-27 | End: 2018-03-27

## 2018-03-27 RX ORDER — NALOXONE HYDROCHLORIDE 0.4 MG/ML
0.4 INJECTION, SOLUTION INTRAMUSCULAR; INTRAVENOUS; SUBCUTANEOUS AS NEEDED
Status: DISCONTINUED | OUTPATIENT
Start: 2018-03-27 | End: 2018-03-28 | Stop reason: HOSPADM

## 2018-03-27 RX ORDER — SODIUM CHLORIDE 0.9 % (FLUSH) 0.9 %
5-10 SYRINGE (ML) INJECTION EVERY 8 HOURS
Status: DISCONTINUED | OUTPATIENT
Start: 2018-03-27 | End: 2018-03-27 | Stop reason: HOSPADM

## 2018-03-27 RX ORDER — MIDAZOLAM HYDROCHLORIDE 1 MG/ML
2 INJECTION, SOLUTION INTRAMUSCULAR; INTRAVENOUS ONCE
Status: COMPLETED | OUTPATIENT
Start: 2018-03-27 | End: 2018-03-27

## 2018-03-27 RX ORDER — SODIUM CHLORIDE 9 MG/ML
100 INJECTION, SOLUTION INTRAVENOUS CONTINUOUS
Status: DISPENSED | OUTPATIENT
Start: 2018-03-27 | End: 2018-03-28

## 2018-03-27 RX ORDER — DOCUSATE SODIUM 100 MG/1
100 CAPSULE, LIQUID FILLED ORAL 2 TIMES DAILY
Status: DISCONTINUED | OUTPATIENT
Start: 2018-03-27 | End: 2018-03-28 | Stop reason: HOSPADM

## 2018-03-27 RX ORDER — POLYMYXIN B 500000 [USP'U]/1
INJECTION, POWDER, LYOPHILIZED, FOR SOLUTION INTRAMUSCULAR; INTRATHECAL; INTRAVENOUS; OPHTHALMIC AS NEEDED
Status: DISCONTINUED | OUTPATIENT
Start: 2018-03-27 | End: 2018-03-27 | Stop reason: HOSPADM

## 2018-03-27 RX ORDER — ZOLPIDEM TARTRATE 5 MG/1
5 TABLET ORAL
Status: DISCONTINUED | OUTPATIENT
Start: 2018-03-27 | End: 2018-03-28 | Stop reason: HOSPADM

## 2018-03-27 RX ORDER — SODIUM CHLORIDE 0.9 % (FLUSH) 0.9 %
5-10 SYRINGE (ML) INJECTION AS NEEDED
Status: DISCONTINUED | OUTPATIENT
Start: 2018-03-27 | End: 2018-03-27 | Stop reason: HOSPADM

## 2018-03-27 RX ORDER — MORPHINE SULFATE 4 MG/ML
4 INJECTION, SOLUTION INTRAMUSCULAR; INTRAVENOUS ONCE
Status: COMPLETED | OUTPATIENT
Start: 2018-03-27 | End: 2018-03-27

## 2018-03-27 RX ORDER — SODIUM CHLORIDE 0.9 % (FLUSH) 0.9 %
5-10 SYRINGE (ML) INJECTION EVERY 8 HOURS
Status: DISCONTINUED | OUTPATIENT
Start: 2018-03-27 | End: 2018-03-28 | Stop reason: HOSPADM

## 2018-03-27 RX ORDER — LABETALOL 100 MG/1
100 TABLET, FILM COATED ORAL 2 TIMES DAILY
Status: DISCONTINUED | OUTPATIENT
Start: 2018-03-27 | End: 2018-03-28 | Stop reason: HOSPADM

## 2018-03-27 RX ORDER — MONTELUKAST SODIUM 10 MG/1
10 TABLET ORAL
Status: DISCONTINUED | OUTPATIENT
Start: 2018-03-27 | End: 2018-03-28 | Stop reason: HOSPADM

## 2018-03-27 RX ORDER — PREGABALIN 75 MG/1
75 CAPSULE ORAL ONCE
Status: COMPLETED | OUTPATIENT
Start: 2018-03-27 | End: 2018-03-27

## 2018-03-27 RX ORDER — PREGABALIN 50 MG/1
50 CAPSULE ORAL 2 TIMES DAILY
Status: DISCONTINUED | OUTPATIENT
Start: 2018-03-27 | End: 2018-03-28 | Stop reason: HOSPADM

## 2018-03-27 RX ORDER — ONDANSETRON 2 MG/ML
INJECTION INTRAMUSCULAR; INTRAVENOUS AS NEEDED
Status: DISCONTINUED | OUTPATIENT
Start: 2018-03-27 | End: 2018-03-27 | Stop reason: HOSPADM

## 2018-03-27 RX ORDER — LIDOCAINE HYDROCHLORIDE 20 MG/ML
INJECTION, SOLUTION EPIDURAL; INFILTRATION; INTRACAUDAL; PERINEURAL AS NEEDED
Status: DISCONTINUED | OUTPATIENT
Start: 2018-03-27 | End: 2018-03-27 | Stop reason: HOSPADM

## 2018-03-27 RX ORDER — PROPOFOL 10 MG/ML
INJECTION, EMULSION INTRAVENOUS AS NEEDED
Status: DISCONTINUED | OUTPATIENT
Start: 2018-03-27 | End: 2018-03-27 | Stop reason: HOSPADM

## 2018-03-27 RX ORDER — HYDROCORTISONE SODIUM SUCCINATE 100 MG/2ML
100 INJECTION, POWDER, FOR SOLUTION INTRAMUSCULAR; INTRAVENOUS ONCE
Status: COMPLETED | OUTPATIENT
Start: 2018-03-27 | End: 2018-03-27

## 2018-03-27 RX ORDER — LANOLIN ALCOHOL/MO/W.PET/CERES
1 CREAM (GRAM) TOPICAL 2 TIMES DAILY WITH MEALS
Status: DISCONTINUED | OUTPATIENT
Start: 2018-03-27 | End: 2018-03-28 | Stop reason: HOSPADM

## 2018-03-27 RX ORDER — FENTANYL CITRATE 50 UG/ML
100 INJECTION, SOLUTION INTRAMUSCULAR; INTRAVENOUS ONCE
Status: COMPLETED | OUTPATIENT
Start: 2018-03-27 | End: 2018-03-27

## 2018-03-27 RX ORDER — CELECOXIB 100 MG/1
200 CAPSULE ORAL 2 TIMES DAILY
Status: DISCONTINUED | OUTPATIENT
Start: 2018-03-27 | End: 2018-03-28 | Stop reason: HOSPADM

## 2018-03-27 RX ORDER — ROPIVACAINE HYDROCHLORIDE 2 MG/ML
30 INJECTION, SOLUTION EPIDURAL; INFILTRATION; PERINEURAL
Status: COMPLETED | OUTPATIENT
Start: 2018-03-27 | End: 2018-03-27

## 2018-03-27 RX ADMIN — ROCURONIUM BROMIDE 5 MG: 10 INJECTION, SOLUTION INTRAVENOUS at 11:19

## 2018-03-27 RX ADMIN — SODIUM CHLORIDE, SODIUM LACTATE, POTASSIUM CHLORIDE, AND CALCIUM CHLORIDE 75 ML/HR: 600; 310; 30; 20 INJECTION, SOLUTION INTRAVENOUS at 09:06

## 2018-03-27 RX ADMIN — CELECOXIB 200 MG: 100 CAPSULE ORAL at 17:39

## 2018-03-27 RX ADMIN — ACETAMINOPHEN 1000 MG: 500 TABLET ORAL at 09:03

## 2018-03-27 RX ADMIN — SODIUM CHLORIDE 1 G: 0.9 INJECTION INTRAVENOUS at 12:45

## 2018-03-27 RX ADMIN — FENTANYL CITRATE 100 MCG: 50 INJECTION INTRAMUSCULAR; INTRAVENOUS at 09:45

## 2018-03-27 RX ADMIN — WARFARIN SODIUM 10 MG: 10 TABLET ORAL at 09:03

## 2018-03-27 RX ADMIN — LIDOCAINE HYDROCHLORIDE 3 ML: 10 INJECTION, SOLUTION EPIDURAL; INFILTRATION; INTRACAUDAL; PERINEURAL at 09:48

## 2018-03-27 RX ADMIN — LIDOCAINE HYDROCHLORIDE 80 MG: 20 INJECTION, SOLUTION EPIDURAL; INFILTRATION; INTRACAUDAL; PERINEURAL at 11:19

## 2018-03-27 RX ADMIN — FAMOTIDINE 20 MG: 20 TABLET, FILM COATED ORAL at 09:04

## 2018-03-27 RX ADMIN — FENTANYL CITRATE 100 MCG: 50 INJECTION, SOLUTION INTRAMUSCULAR; INTRAVENOUS at 11:19

## 2018-03-27 RX ADMIN — LEVOFLOXACIN 500 MG: 500 TABLET, FILM COATED ORAL at 20:30

## 2018-03-27 RX ADMIN — PREGABALIN 50 MG: 50 CAPSULE ORAL at 17:39

## 2018-03-27 RX ADMIN — MONTELUKAST SODIUM 10 MG: 10 TABLET, FILM COATED ORAL at 23:05

## 2018-03-27 RX ADMIN — ONDANSETRON 4 MG: 2 INJECTION INTRAMUSCULAR; INTRAVENOUS at 11:11

## 2018-03-27 RX ADMIN — SODIUM CHLORIDE 100 ML/HR: 900 INJECTION, SOLUTION INTRAVENOUS at 17:38

## 2018-03-27 RX ADMIN — FERROUS SULFATE TAB 325 MG (65 MG ELEMENTAL FE) 325 MG: 325 (65 FE) TAB at 17:40

## 2018-03-27 RX ADMIN — Medication 10 ML: at 23:10

## 2018-03-27 RX ADMIN — SODIUM CHLORIDE, SODIUM LACTATE, POTASSIUM CHLORIDE, AND CALCIUM CHLORIDE: 600; 310; 30; 20 INJECTION, SOLUTION INTRAVENOUS at 12:30

## 2018-03-27 RX ADMIN — CLINDAMYCIN PHOSPHATE 600 MG: 150 INJECTION, SOLUTION INTRAVENOUS at 19:00

## 2018-03-27 RX ADMIN — DEXAMETHASONE SODIUM PHOSPHATE 8 MG: 4 INJECTION, SOLUTION INTRA-ARTICULAR; INTRALESIONAL; INTRAMUSCULAR; INTRAVENOUS; SOFT TISSUE at 11:19

## 2018-03-27 RX ADMIN — RANITIDINE HYDROCHLORIDE 150 MG: 150 TABLET, FILM COATED ORAL at 23:05

## 2018-03-27 RX ADMIN — MIDAZOLAM HYDROCHLORIDE 2 MG: 1 INJECTION, SOLUTION INTRAMUSCULAR; INTRAVENOUS at 09:45

## 2018-03-27 RX ADMIN — MIDAZOLAM HYDROCHLORIDE 2 MG: 1 INJECTION, SOLUTION INTRAMUSCULAR; INTRAVENOUS at 11:11

## 2018-03-27 RX ADMIN — GLYCOPYRROLATE 0.2 MG: 0.2 INJECTION INTRAMUSCULAR; INTRAVENOUS at 11:11

## 2018-03-27 RX ADMIN — ACETAMINOPHEN 1000 MG: 500 TABLET, COATED ORAL at 17:39

## 2018-03-27 RX ADMIN — LABETALOL HYDROCHLORIDE 100 MG: 100 TABLET, FILM COATED ORAL at 17:39

## 2018-03-27 RX ADMIN — HYDROCORTISONE SODIUM SUCCINATE 100 MG: 100 INJECTION, POWDER, FOR SOLUTION INTRAMUSCULAR; INTRAVENOUS at 10:00

## 2018-03-27 RX ADMIN — DOCUSATE SODIUM 100 MG: 100 CAPSULE, LIQUID FILLED ORAL at 17:40

## 2018-03-27 RX ADMIN — SUCCINYLCHOLINE CHLORIDE 160 MG: 20 INJECTION INTRAMUSCULAR; INTRAVENOUS at 11:19

## 2018-03-27 RX ADMIN — PREGABALIN 75 MG: 75 CAPSULE ORAL at 09:04

## 2018-03-27 RX ADMIN — MORPHINE SULFATE 4 MG: 4 INJECTION INTRAVENOUS at 14:10

## 2018-03-27 RX ADMIN — SODIUM CHLORIDE 1 G: 0.9 INJECTION INTRAVENOUS at 11:11

## 2018-03-27 RX ADMIN — PROPOFOL 160 MG: 10 INJECTION, EMULSION INTRAVENOUS at 11:19

## 2018-03-27 RX ADMIN — SODIUM CHLORIDE, SODIUM LACTATE, POTASSIUM CHLORIDE, AND CALCIUM CHLORIDE: 600; 310; 30; 20 INJECTION, SOLUTION INTRAVENOUS at 11:11

## 2018-03-27 RX ADMIN — MORPHINE SULFATE 4 MG: 4 INJECTION, SOLUTION INTRAMUSCULAR; INTRAVENOUS at 14:10

## 2018-03-27 RX ADMIN — SODIUM CHLORIDE 900 MG: 900 INJECTION, SOLUTION INTRAVENOUS at 11:11

## 2018-03-27 RX ADMIN — ROPIVACAINE HYDROCHLORIDE 60 MG: 2 INJECTION, SOLUTION EPIDURAL; INFILTRATION; PERINEURAL at 09:49

## 2018-03-27 RX ADMIN — ACETAMINOPHEN 1000 MG: 500 TABLET, COATED ORAL at 22:00

## 2018-03-27 NOTE — BRIEF OP NOTE
BRIEF OPERATIVE NOTE    Date of Procedure: 3/27/2018   Preoperative Diagnosis: Osteoarthritis of left knee, unspecified osteoarthritis type [M17.12]  Postoperative Diagnosis: Osteoarthritis of left knee, unspecified osteoarthritis type [M17.12]    Procedure(s):  LEFT TOTAL KNEE ARTHROPLASTY  Surgeon(s) and Role:     * Jaylin Leos MD - Primary         Assistant Staff: Physician Assistant: Fauzia Tate PA-C      Surgical Staff:  Circ-1: Zak Meek RN  Physician Assistant: Fauzia Tate PA-C  Scrub Tech-1: Elana Holliday  Surg Asst-1: Gonzalez Pata  Event Time In   Incision Start 1139   Incision Close      Anesthesia: General   Estimated Blood Loss: 50ml  Specimens: * No specimens in log *   Findings: same   Complications: none  Implants:   Implant Name Type Inv.  Item Serial No.  Lot No. LRB No. Used Action   CEMENT BNE SIMPLEX TOBRA 4 --  - [id number]  CEMENT BNE SIMPLEX TOBRA 4 --   [employer] [id number] Left 1 Implanted   CEMENT BNE SIMPLEX TOBRA 4 --  - [id number]  CEMENT BNE SIMPLEX TOBRA 4 --   [employer] [id number] Left 1 Implanted   PAT ASYM TRIATHLN X3 [id number] -- TRIATHLON ASYMMETRIC X3 - [id number]  PAT ASYM TRIATHLN X3 [id number] -- TRIATHLON ASYMMETRIC X3  [employer] [id number] Left 1 Implanted   BASEPLT TIB UNIV TRIATHLN 5 --  - [id number]  BASEPLT TIB UNIV TRIATHLN 5 --   [employer] [id number] Left 1 Implanted   COMPNT FEM POST STBL 5 L --  - [id number]  COMPNT FEM POST STBL 5 L --   [employer] HOW [id number] Left 1 Implanted   INSERT TIB PS TRIATHLN X3 5 11 --  - [id number]   INSERT TIB PS TRIATHLN X3 5 11 --    [employer] HOW [id number] Left 1 Implanted

## 2018-03-27 NOTE — ANESTHESIA POSTPROCEDURE EVALUATION
Post-Anesthesia Evaluation and Assessment    Patient: Yariel Boland MRN: [id number]  SSN: [id number]    YOB: 1955  Age: 57 y.o. Sex: male       Cardiovascular Function/Vital Signs  Visit Vitals    /82 (BP 1 Location: Left arm, BP Patient Position: At rest)    Pulse 75    Temp 36.6 °C (97.8 °F)    Resp 15    Ht 5' 10\" (1.778 m)    Wt 100.5 kg (221 lb 9.6 oz)    SpO2 96%    BMI 31.8 kg/m2       Patient is status post regional anesthesia for Procedure(s):  LEFT TOTAL KNEE ARTHROPLASTY. Nausea/Vomiting: None    Postoperative hydration reviewed and adequate. Pain:  Pain Scale 1: Numeric (0 - 10) (03/27/18 1418)  Pain Intensity 1: 4 (03/27/18 1418)   Managed    Neurological Status:   Neuro (WDL): Within Defined Limits (03/27/18 1314)   At baseline    Mental Status and Level of Consciousness: Arousable    Pulmonary Status:   O2 Device: Room air (03/27/18 1401)   Adequate oxygenation and airway patent    Complications related to anesthesia: None    Post-anesthesia assessment completed.  No concerns    Signed By: Rivka Addison MD     March 27, 2018

## 2018-03-27 NOTE — INTERVAL H&P NOTE
H&P Update:  Shayy Lin was seen and examined. History and physical has been reviewed. The patient has been examined.  There have been no significant clinical changes since the completion of the originally dated History and Physical.    Signed By: Priscila Salguero MD     March 27, 2018 10:00 AM

## 2018-03-27 NOTE — CONSULTS
[facility]. Pulmonary, Critical Care, and Sleep Medicine    Initial Patient Consult    Name: Lakshmi Ravi MRN: [id number]   : 1955 Hospital: [address] [place] [address]    Date: 3/27/2018        IMPRESSION:   · Postop left total knee replacement- POD #0  · HTN  · H/o VTE- postop in   · GERD       Patient Active Problem List   Diagnosis Code    Esophageal reflux K21.9    Pure hypercholesterolemia E78.00    Personal history of venous thrombosis and embolism Z86.718    Facet arthropathy, lumbar M12.88    DDD (degenerative disc disease), lumbar M51.36    Essential hypertension I10    Bronchitis J40    Chronic tension-type headache, not intractable G44.229    Lumbar spinal stenosis M48.061    Abdominal bloating R14.0    Myofascial pain M79.1    Cervical facet joint syndrome M12.88    Muscle spasm M62.838    Warfarin anticoagulation Z79.01    Muscle spasm of back M62.830    Primary osteoarthritis of left knee M17.12    Arthritis of knee M17.10        RECOMMENDATIONS:   · Continue Post op progression of care  · Will monitor BP, renal function, H/H  · Monitor I and O.   · Fluids- 24 hrs and progress to incremental PO intake  · Bronchial hygiene protocol  · Antibiotics- SCIP  · Aspiration precautions  · Bowel prep  · Pain control per ortho  · OT, PT, OOB and ambulate  · Will Follow for medical management  · DVT, PUD prophylaxis  · Discussed with patient. Subjective: This patient has been seen and evaluated at the request of Dr. Alexey Garcia for Postop management. Patient is a 57 y.o. male with h/o HTN  Well controlle. He follows with Dr. Emily Berrios- PCP. Has end stage osteoarthritis needing surgical treatment. He underwent left TKR under general anesthesia with   EBL 50 ml. Seen postop. No c/o SOB, Cough, chest pain  Denies nausea, vomiting  Denies dizziness, palpitations  Denies any rashes, itching. No other complaints offered.   Reviewed medical records and available data. Past Medical History:   Diagnosis Date    Arthritis     Bleeding     Blood clot in the legs     Chronic pain     knee and shoulder    Esophageal reflux     GERD (gastroesophageal reflux disease)     Headache(784.0)     migraine    High cholesterol     Hypertension     Lower back pain 11/6/2010    Other chest pain     Personal history of venous thrombosis and embolism     Pure hypercholesterolemia     Right buttock pain 11/6/2010    Right foot pain     Rotator cuff tear     left-since 2010, worsened tear Jan.    Spinal stenosis     Tendonitis, tibialis     anterior    Thromboembolus ([address]     3 after sx last one 2000      Past Surgical History:   Procedure Laterality Date    FOOT/TOES SURGERY PROC UNLISTED      HX BACK SURGERY      HX ORTHOPAEDIC  06-25-12    Right foot with excision of bursa and adipose tissue from fifth metatarsal base by Dr. Noreen Velazquez      lower back (1992 and 2000)    HX OTHER SURGICAL      left foot (2008)    LAMINOTOMY      NERVE BLOCK        Prior to Admission medications    Medication Sig Start Date End Date Taking? Authorizing Provider   lisinopril-hydroCHLOROthiazide (PRINZIDE, ZESTORETIC) 20-12.5 mg per tablet TAKE 1 TABLET BY MOUTH DAILY 3/26/18  Yes Khoa Jose MD   labetalol (NORMODYNE) 100 mg tablet TAKE ONE TABLET BY MOUTH TWICE DAILY 3/26/18  Yes Khoa Jose MD   LORATADINE/PSEUDOEPHEDRINE (CLARITIN-D 24 HOUR PO) Take  by mouth daily. Yes Historical Provider   L. gasseri-B. bifidum-B longum (MCALLISTER' [address]) 1.5 billion cell cap Take  by mouth daily.    Yes Historical Provider   azelastine (ASTELIN) 137 mcg (0.1 %) nasal spray 1 spray up each nostril twice per day 3/8/18  Yes Khoa Jose MD   warfarin (COUMADIN) 5 mg tablet TAKE 2 AND 1/2 TABLETS BY MOUTH DAILY OR AS DIRECTED  Patient taking differently: TAKE 2 AND 1/2 TABLETS BY MOUTH DAILY OR AS DIRECTED   taking 2 tablets  Monday, Thursday and Saturday and take 2 1/2 tablets all other days 3/5/18  Yes Kan Hartman MD   metaxalone [facility]) 800 mg tablet Take 1 Tab by mouth three (3) times daily. Indications: Muscle Spasm  Patient taking differently: Take 800 mg by mouth three (3) times daily as needed. Indications: Muscle Spasm 2/6/18  Yes Maximino Greco MD   montelukast (SINGULAIR) 10 mg tablet TAKE 1 TABLET BY MOUTH EVERY DAY  Patient taking differently: TAKE 1 TABLET BY MOUTH EVERY HS 2/5/18  Yes Kan Hartman MD   butalbital-acetaminophen-caff (FIORICET) -40 mg per capsule Take 2 capsules every 8 hours as needed for headache 12/13/17  Yes Kan Hartman MD   red yeast rice extract 600 mg cap Take 1,200 mg by mouth daily. Yes Historical Provider   lansoprazole (PREVACID) 30 mg capsule TAKE 1 CAPSULE DAILY BEFOREBREAKFAST 8/3/17  Yes Johanne Ness NP   raNITIdine (ZANTAC) 150 mg tablet TK 1 T PO HS 3/15/17  Yes Historical Provider   acetaminophen (TYLENOL) 500 mg tablet Take 1,000 mg by mouth every six (6) hours as needed for Pain. Yes Historical Provider   enoxaparin (LOVENOX) 40 mg/0.4 mL 0.4 mL by SubCUTAneous route daily for 4 days. Stop coumadin today. Morning of 3/23/18 take lovenox, last does morning of 3/26/18  Indications: DEEP VEIN THROMBOSIS PREVENTION 3/22/18 3/26/18  CARA Tam   predniSONE (DELTASONE) 20 mg tablet 2 tabs daily x 2 days, 1 tab daily x 2 days, 1/2 tab daily x 4 days 3/8/18   Kan Hartman MD   nystatin (MYCOSTATIN) topical cream Apply  to affected area two (2) times a day. Patient taking differently: Apply  to affected area two (2) times daily as needed.  10/23/17   Kan Hartman MD     Allergies   Allergen Reactions    Augmentin [Amoxicillin-Pot Clavulanate] Itching    Hibiclens [Chlorhexidine Gluconate] Itching    Penicillins Rash      Social History   Substance Use Topics    Smoking status: Never Smoker    Smokeless tobacco: Never Used    Alcohol use No      Family History Problem Relation Age of Onset   [facility] Arthritis-rheumatoid Mother     Dementia Mother     Heart Disease Father     Cancer Father     Hypertension Other         Current Facility-Administered Medications   Medication Dose Route Frequency    [START ON 3/28/2018] WARFARIN INFORMATION NOTE (COUMADIN)   Other Q24H    [START ON 3/28/2018] azelastine (ASTELIN) 137mcg/spray nasal spray  1 Spray Both Nostrils BID    labetalol (NORMODYNE) tablet 100 mg  100 mg Oral BID    [START ON 3/28/2018] pantoprazole (PROTONIX) tablet 40 mg  40 mg Oral ACB    levoFLOXacin (LEVAQUIN) tablet 500 mg  500 mg Oral Q24H    [START ON 3/28/2018] lisinopril-hydroCHLOROthiazide (PRINZIDE, ZESTORETIC) 20-12.5 mg per tablet 1 Tab  1 Tab Oral DAILY    montelukast (SINGULAIR) tablet 10 mg  10 mg Oral QHS    raNITIdine (ZANTAC) tablet 150 mg  150 mg Oral QHS    0.9% sodium chloride infusion  100 mL/hr IntraVENous CONTINUOUS    sodium chloride (NS) flush 5-10 mL  5-10 mL IntraVENous Q8H    ferrous sulfate tablet 325 mg  1 Tab Oral BID WITH MEALS    acetaminophen (TYLENOL) tablet 1,000 mg  1,000 mg Oral QID    clindamycin phosphate (CLEOCIN) 600 mg in 0.9% sodium chloride (MBP/ADV) 100 mL ADV  600 mg IntraVENous Q8H    docusate sodium (COLACE) capsule 100 mg  100 mg Oral BID    celecoxib (CELEBREX) capsule 200 mg  200 mg Oral BID    pregabalin (LYRICA) capsule 50 mg  50 mg Oral BID       Review of Systems:  Pertinent items are noted in HPI.   ROS    Objective:   Vital Signs:    Visit Vitals    /82 (BP 1 Location: Left arm, BP Patient Position: At rest)    Pulse 75    Temp 97.8 °F (36.6 °C)    Resp 15    Ht 5' 10\" (1.778 m)    Wt 100.5 kg (221 lb 9.6 oz)    SpO2 96%    BMI 31.8 kg/m2       O2 Device: Room air   O2 Flow Rate (L/min): 2 l/min   Temp (24hrs), Av.9 °F (36.6 °C), Min:97.7 °F (36.5 °C), Max:98.2 °F (36.8 °C)       Intake/Output:   Last shift:         Last 3 shifts: 701 - 1900  In:  [I.V.:2000]  Out: 525 [Urine:375; Drains:150]    Intake/Output Summary (Last 24 hours) at 03/27/18 1907  Last data filed at 03/27/18 1835   Gross per 24 hour   Intake             2000 ml   Output              525 ml   Net             1475 ml        Physical Exam:   General:  Alert, cooperative, no distress, appears stated age. Head:  Normocephalic, without obvious abnormality, atraumatic. Eyes:  Conjunctivae/corneas clear. ANicteric   Nose: Nares normal. Mucosa normal. No drainage or sinus tenderness. Throat: Lips, mucosa dry. NO thrush;    Neck: Supple, symmetrical, trachea midline, no adenopathy, thyroid: no enlargment/tenderness/nodules, no carotid bruit and no JVD. No crepitus   Back:   Symmetric, no curvature, no spine tenderness or flank pain   Lungs:   Bilateral auscultation - clear to auscultaion   Chest wall:  No tenderness or deformity. Heart:  Regular rate and rhythm, S1, S2 normal, no murmur, click, rub or gallop. Abdomen:   Soft, non-tender. Bowel sounds normal. No masses,  No organomegaly. No paradox   Extremities: normal, atraumatic, no cyanosis or edema. Surgical site- intact   Pulses: 1-2+ and symmetric all extremities.    Skin: Skin color, texture, turgor normal. No rashes or lesions   Lymph nodes: Cervical, supraclavicular, and axillary nodes normal.   Neurologic: Grossly nonfocal          Data review:   Labs:  Recent Results (from the past 24 hour(s))   POC INR (AMB)    Collection Time: 03/27/18  8:56 AM   Result Value Ref Range    INR (POC) 1.0 <1.2         PFT Results  (Last 48 hours)    None        Echo Results  (Last 48 hours)    None        Imaging:  I have personally reviewed the patients radiographs and have reviewed the reports:  CXR Results  (Last 48 hours)    None        CT Results  (Last 48 hours)    None            High complexity decision making was performed during the evaluation of this patient at high risk for decompensation with multiple organ involvement     Above mentioned total time spent on reviewing the case/medical record/data/notes/EMR/patient examination/documentation/coordinating care with nurse/consultants, exclusive of procedures with complex decision making performed and > 50% time spent in face to face evaluation.      Manuela Wright MD

## 2018-03-27 NOTE — PROGRESS NOTES
Problem: Mobility Impaired (Adult and Pediatric)  Goal: *Acute Goals and Plan of Care (Insert Text)  Physical Therapy Goals  Initiated 3/27/2018 and to be accomplished within 7 day(s)  1. Patient will move from supine to sit and sit to supine , scoot up and down and roll side to side in bed with supervision/set-up. 2.  Patient will transfer from bed to chair and chair to bed with supervision/set-up using the least restrictive device. 3.  Patient will perform sit to stand with supervision/set-up. 4.  Patient will ambulate with supervision/set-up for >150 feet with the least restrictive device. 5.  Patient will ascend/descend 4 stairs with 1 handrail(s) with supervision/set-up. physical Therapy EVALUATION    Patient: Shayy Lin (19 y.o. male)  Date: 3/27/2018  Primary Diagnosis: Osteoarthritis of left knee, unspecified osteoarthritis type [M17.12]  Procedure(s) (LRB):  LEFT TOTAL KNEE ARTHROPLASTY (Left) Day of Surgery   Precautions: Fall, WBAT LLE        ASSESSMENT :  Patient is 58 yo F admitted to hospital for TKA and presents today aler and agreeable to therapy. Patient was educated on weight bearing status, role of therapy, TE (see below), and equipment in room including role of SCDs, towel roll, and ice sleeve. Patient demonstrated intact SLR and transferred to sitting EOB for objective assessment. Patient was given demo with instruction on sit <> stand transfer and gait training. Patient transferred to standing with standby assist and ambulated 75ft with RW and supervision assist.  At conclusion of session patient transferred to sitting in recliner/supine in bed and was left resting with call bell by the side, SCDs donned, and towel roll under ankle. Patient instructed to call for assistance if they needed to get up for any reason and denied need for further assistance.  Patient demonstrates decreased strength, mobility, and endurance and will benefit from skilled intervention to address the above impairments. Patients rehabilitation potential is considered to be Good  Factors which may influence rehabilitation potential include:   []         None noted  []         Mental ability/status  [x]         Medical condition  [x]         Home/family situation and support systems  [x]         Safety awareness  [x]         Pain tolerance/management  []         Other:      PLAN :  Recommendations and Planned Interventions:  [x]           Bed Mobility Training             [x]    Neuromuscular Re-Education  [x]           Transfer Training                   []    Orthotic/Prosthetic Training  [x]           Gait Training                          []    Modalities  [x]           Therapeutic Exercises          []    Edema Management/Control  [x]           Therapeutic Activities            [x]    Patient and Family Training/Education  []           Other (comment):    Frequency/Duration: Patient will be followed by physical therapy 1-2 times per day/4-7 days per week to address goals. Discharge Recommendations: Home Health  Further Equipment Recommendations for Discharge: rolling walker and N/A     G-CODES     Mobility  Current  CI= 1-19%   Goal  CI= 1-19%. The severity rating is based on the Level of Assistance required for Functional Mobility and ADLs.        G-CODES     Eval Complexity: History: MEDIUM  Complexity : 1-2 comorbidities / personal factors will impact the outcome/ POC Exam:LOW Complexity : 1-2 Standardized tests and measures addressing body structure, function, activity limitation and / or participation in recreation  Presentation: LOW Complexity : Stable, uncomplicated  Clinical Decision Making:Low Complexity   Overall Complexity:LOW     SUBJECTIVE:   Patient stated I didn't think I would be able to do all that.     OBJECTIVE DATA SUMMARY:     Past Medical History:   Diagnosis Date    Arthritis     Bleeding     Blood clot in the legs     Chronic pain     knee and shoulder    Esophageal reflux     GERD (gastroesophageal reflux disease)     Headache(784.0)     migraine    High cholesterol     Hypertension     Lower back pain 11/6/2010    Other chest pain     Personal history of venous thrombosis and embolism     Pure hypercholesterolemia     Right buttock pain 11/6/2010    Right foot pain     Rotator cuff tear     left-since 2010, worsened tear Jan.    Spinal stenosis     Tendonitis, tibialis     anterior    Thromboembolus ([facility] [address]     3 after sx last one 2000     Past Surgical History:   Procedure Laterality Date    FOOT/TOES SURGERY PROC UNLISTED      HX BACK SURGERY      HX ORTHOPAEDIC  06-25-12    Right foot with excision of bursa and adipose tissue from fifth metatarsal base by Dr. Suly Esquivel      lower back (1992 and 2000)    HX OTHER SURGICAL      left foot (2008)    LAMINOTOMY      NERVE BLOCK       Barriers to Learning/Limitations: None  Compensate with: N/A  Prior Level of Function/Home Situation: Patient reports that he lives with wife and lives in 1 story home with 3STE without HR's. Patient reports he was independent with mobility and I/ADL's PTA. Home Situation  Home Environment: Private residence  # Steps to Enter: 3  One/Two Story Residence: One story  Living Alone: No  Support Systems: Spouse/Significant Other/Partner, Child(rosie)  Patient Expects to be Discharged to[de-identified] Private residence  Current DME Used/Available at Home: Cane, straight  Critical Behavior:    A&Ox4  Strength:    Strength: Generally decreased, functional (RLE 5/5, L knee flex/ext 3/5, DF 5/5)   Tone & Sensation:   Tone: Normal (BLE)   Sensation: Intact (BLE)   Range Of Motion:  AROM: Generally decreased, functional (L knee 0-110 degrees)   Functional Mobility:  Bed Mobility:   Supine to Sit: Supervision  Sit to Supine: Supervision  Scooting: Supervision  Transfers:  Sit to Stand: Supervision  Stand to Sit: Supervision    Balance:   Sitting: Intact  Standing: Impaired; With support  Standing - Static: Fair  Standing - Dynamic : Fair  Ambulation/Gait Training:  Distance (ft): 70 Feet (ft)  Assistive Device: Walker, rolling  Ambulation - Level of Assistance: Supervision   Speed/Veronica: Slow  Interventions: Verbal cues; Visual/Demos                Therapeutic Exercises:   Instructed patient in performing heel slides and quad sets (3sec hold) x10 on the hour while awake; performed x3 for teach back this session. Pain:  Pt reports 0/10 pain or discomfort prior to treatment.    Pt reports 0/10 pain or discomfort post treatment. Activity Tolerance:   Patient tolerated activity well and was left resting with call bell by his side. Please refer to the flowsheet for vital signs taken during this treatment. After treatment:   [x]         Patient left in no apparent distress sitting up in chair  [x]         Patient left in no apparent distress in bed  [x]         Call bell left within reach  []         Nursing notified  []         Caregiver present  []         Bed alarm activated  [x]         SCDs in place to B LE     COMMUNICATION/EDUCATION:   [x]         Fall prevention education was provided and the patient/caregiver indicated understanding. [x]         Patient/family have participated as able in goal setting and plan of care. [x]         Patient/family agree to work toward stated goals and plan of care. []         Patient understands intent and goals of therapy, but is neutral about his/her participation. []         Patient is unable to participate in goal setting and plan of care.     Thank you for this referral.  Nicolette Scott, PT

## 2018-03-27 NOTE — ANESTHESIA PREPROCEDURE EVALUATION
Anesthetic History   No history of anesthetic complications            Review of Systems / Medical History  Patient summary reviewed and pertinent labs reviewed    Pulmonary  Within defined limits                 Neuro/Psych   Within defined limits           Cardiovascular    Hypertension: well controlled              Exercise tolerance: >4 METS     GI/Hepatic/Renal     GERD: well controlled           Endo/Other        Arthritis    Comments: H/O DVT Other Findings   Comments: Documentation of current medication  Current medications obtained, documented and obtained? YES      Risk Factors for Postoperative nausea/vomiting:       History of postoperative nausea/vomiting? NO       Female? NO       Motion sickness? NO       Intended opioid administration for postoperative analgesia? NO      Smoking Abstinence:  Current Smoker? NO  Elective Surgery? YES  Seen preoperatively by anesthesiologist or proxy prior to day of surgery? YES  Pt abstained from smoking 24 hours prior to anesthesia?  N/A    Preventive care/screening for High Blood Pressure:  Aged 18 years and older: YES  Screened for high blood pressure: YES  Patients with high blood pressure referred to primary care provider   for BP management: YES                 Physical Exam    Airway  Mallampati: II  TM Distance: 4 - 6 cm  Neck ROM: normal range of motion   Mouth opening: Normal     Cardiovascular  Regular rate and rhythm,  S1 and S2 normal,  no murmur, click, rub, or gallop             Dental  No notable dental hx       Pulmonary  Breath sounds clear to auscultation               Abdominal  GI exam deferred       Other Findings            Anesthetic Plan    ASA: 2  Anesthesia type: regional - femoral single shot            Anesthetic plan and risks discussed with: Patient

## 2018-03-27 NOTE — ROUTINE PROCESS
Patient admitted to floor from PACU. He Is awake and alert.  Patient oriented to room and how to use call bell

## 2018-03-27 NOTE — IP AVS SNAPSHOT
[address] Patient: John Drew MRN: [id number] NT About your hospitalization You were admitted on:  2018 You last received care in the:  [facility] 1982 You were discharged on:  2018 Why you were hospitalized Your primary diagnosis was:  Not on File Your diagnoses also included: Arthritis Of Knee Follow-up Information Follow up With Details Comments Contact Info Margie Cole MD On 2018 Appointment at 9:30am [address] [facility] [address] 
[phone] Jagdishisrael Boykin On 2018 Appointment at [facility] [address] Your Scheduled Appointments   9:00 AM EDT  
POST OP with Veronique Soto [facility] [address] [place] [address]) [address] [facility], [address] [facility] [address]  
[phone] 2018  9:30 AM EDT Office Visit with Margie Cole MD  
[facility] [place] [address] [facility]) [address] [address], [address] [facility] [address]  
[phone] Tuesday May 15, 2018  3:30 PM EDT Follow Up with Duane Ensign, MD  
[address] [place] [address], [address] and [facility] [address] [facility]) [address] [facility] [address]  
[phone] Discharge Orders Procedure Order Date Status Priority Quantity Spec Type Associated Dx ELEVATED TOILET SEAT 18 0838 Normal Routine 1  Arthritis of knee [[id number]] COMMODE CHAIR 18 0838 Normal Routine 1  Arthritis of knee [[id number]] SHOWER CHAIR 18 0838 Normal Routine 1  Arthritis of knee [[id number]] WALKER STANDARD 18 0838 Normal Routine 1  Arthritis of knee [[id number]] [address] 18 0838 Normal Routine 1  Arthritis of knee [[id number]] Comments: Total knee protocol, wbat Pt/inr mon/thurs 
lovenox injections-  To dc once INR 2.0 or above 
aquacel ag dressing pod 7 and prn A check abigail indicates which time of day the medication should be taken. My Medications START taking these medications Instructions Each Dose to Equal  
 Morning Noon Evening Bedtime  
 celecoxib 200 mg capsule Commonly known as:  CELEBREX Your last dose was: Your next dose is: Take 1 Cap by mouth two (2) times a day for 90 days. 200 mg  
    
   
   
   
  
 oxyCODONE-acetaminophen  mg per tablet Commonly known as:  PERCOCET Your last dose was: Your next dose is: Take 1-2 Tabs by mouth every six (6) hours as needed for Pain. Max Daily Amount: 8 Tabs. 1-2 Tab CHANGE how you take these medications Instructions Each Dose to Equal  
 Morning Noon Evening Bedtime  
 enoxaparin 30 mg/0.3 mL injection Commonly known as:  LOVENOX What changed:   
- medication strength 
- how much to take - when to take this 
- additional instructions Your last dose was: Your next dose is: 0.3 mL by SubCUTAneous route once for 1 dose. 30 mg  
    
   
   
   
  
 metaxalone 800 mg tablet Commonly known as:  SKELAXIN What changed:   
- when to take this 
- reasons to take this Your last dose was: Your next dose is: Take 1 Tab by mouth three (3) times daily. Indications: Muscle Spasm 800 mg  
    
   
   
   
  
 montelukast 10 mg tablet Commonly known as:  SINGULAIR What changed:  See the new instructions. Your last dose was: Your next dose is: TAKE 1 TABLET BY MOUTH EVERY DAY  
     
   
   
   
  
 nystatin topical cream  
Commonly known as:  MYCOSTATIN What changed:   
- when to take this 
- reasons to take this Your last dose was: Your next dose is: Apply  to affected area two (2) times a day. * warfarin 5 mg tablet Commonly known as:  COUMADIN What changed:  See the new instructions. Your last dose was: Your next dose is: TAKE 2 AND 1/2 TABLETS BY MOUTH DAILY OR AS DIRECTED * warfarin 3 mg tablet Commonly known as:  COUMADIN What changed: You were already taking a medication with the same name, and this prescription was added. Make sure you understand how and when to take each. Your last dose was: Your next dose is: Take 1 Tab by mouth daily for 21 days. 3 mg * Notice: This list has 2 medication(s) that are the same as other medications prescribed for you. Read the directions carefully, and ask your doctor or other care provider to review them with you. CONTINUE taking these medications Instructions Each Dose to Equal  
 Morning Noon Evening Bedtime  
 acetaminophen 500 mg tablet Commonly known as:  TYLENOL Your last dose was: Your next dose is: Take 1,000 mg by mouth every six (6) hours as needed for Pain. 1000 mg  
    
   
   
   
  
 azelastine 137 mcg (0.1 %) nasal spray Commonly known as:  ASTELIN Your last dose was: Your next dose is:    
   
   
 1 spray up each nostril twice per day  
     
   
   
   
  
 butalbital-acetaminophen-caff -40 mg per capsule Commonly known as:  [employer] Your last dose was: Your next dose is: Take 2 capsules every 8 hours as needed for headache CLARITIN-D 24 HOUR PO Your last dose was: Your next dose is: Take  by mouth daily. labetalol 100 mg tablet Commonly known as:  Teresa Quarles Your last dose was: Your next dose is: TAKE ONE TABLET BY MOUTH TWICE DAILY lansoprazole 30 mg capsule Commonly known as:  PREVACID Your last dose was: Your next dose is: TAKE 1 CAPSULE DAILY BEFOREBREAKFAST  
     
   
   
   
  
 lisinopril-hydroCHLOROthiazide 20-12.5 mg per tablet Commonly known as:  All Priya Your last dose was: Your next dose is: TAKE 1 TABLET BY MOUTH DAILY [employer] 1.5 billion cell Cap Generic drug:  L. gasseri-B. bifidum-B longum Your last dose was: Your next dose is: Take  by mouth daily. predniSONE 20 mg tablet Commonly known as:  Rima [employer]vers Your last dose was: Your next dose is:    
   
   
 2 tabs daily x 2 days, 1 tab daily x 2 days, 1/2 tab daily x 4 days  
     
   
   
   
  
 raNITIdine 150 mg tablet Commonly known as:  ZANTAC Your last dose was: Your next dose is:    
   
   
 TK 1 T PO HS  
     
   
   
   
  
 red yeast rice extract 600 mg Cap Your last dose was: Your next dose is: Take 1,200 mg by mouth daily. 1200 mg Where to Get Your Medications Information on where to get these meds will be given to you by the nurse or doctor. ! Ask your nurse or doctor about these medications  
  celecoxib 200 mg capsule  
 enoxaparin 30 mg/0.3 mL injection  
 oxyCODONE-acetaminophen  mg per tablet  
 warfarin 3 mg tablet Opioid Education Prescription Opioids: What You Need to Know: 
 
Prescription opioids can be used to help relieve moderate-to-severe pain and are often prescribed following a surgery or injury, or for certain health conditions. These medications can be an important part of treatment but also come with serious risks. Opioids are strong pain medicines.  Examples include hydrocodone, oxycodone, fentanyl, and morphine. Heroin is an example of an illegal opioid. It is important to work with your health care provider to make sure you are getting the safest, most effective care. WHAT ARE THE RISKS AND SIDE EFFECTS OF OPIOID USE? Prescription opioids carry serious risks of addiction and overdose, especially with prolonged use. An opioid overdose, often marked by slow breathing, can cause sudden death. The use of prescription opioids can have a number of side effects as well, even when taken as directed. · Tolerance-meaning you might need to take more of a medication for the same pain relief · Physical dependence-meaning you have symptoms of withdrawal when the medication is stopped. Withdrawal symptoms can include nausea, sweating, chills, diarrhea, stomach cramps, and muscle aches. Withdrawal can last up to several weeks, depending on which drug you took and how long you took it. · Increased sensitivity to pain · Constipation · Nausea, vomiting, and dry mouth · Sleepiness and dizziness · Confusion · Depression · Low levels of testosterone that can result in lower sex drive, energy, and strength · Itching and sweating RISKS ARE GREATER WITH:      
· History of drug misuse, substance use disorder, or overdose · Mental health conditions (such as depression or anxiety) · Sleep apnea · Older age (72 years or older) · Pregnancy Avoid alcohol while taking prescription opioids. Also, unless specifically advised by your health care provider, medications to avoid include: · Benzodiazepines (such as Xanax or Valium) · Muscle relaxants (such as Soma or Flexeril) · Hypnotics (such as Ambien or Lunesta) · Other prescription opioids KNOW YOUR OPTIONS Talk to your health care provider about ways to manage your pain that don't involve prescription opioids. Some of these options may actually work better and have fewer risks and side effects. Options may include: · Pain relievers such as acetaminophen, ibuprofen, and naproxen · Some medications that are also used for depression or seizures · Physical therapy and exercise · Counseling to help patients learn how to cope better with triggers of pain and stress. · Application of heat or cold compress · Massage therapy · Relaxation techniques Be Informed Make sure you know the name of your medication, how much and how often to take it, and its potential risks & side effects. IF YOU ARE PRESCRIBED OPIOIDS FOR PAIN: 
· Never take opioids in greater amounts or more often than prescribed. Remember the goal is not to be pain-free but to manage your pain at a tolerable level. · Follow up with your primary care provider to: · Work together to create a plan on how to manage your pain. · Talk about ways to help manage your pain that don't involve prescription opioids. · Talk about any and all concerns and side effects. · Help prevent misuse and abuse. · Never sell or share prescription opioids · Help prevent misuse and abuse. · Store prescription opioids in a secure place and out of reach of others (this may include visitors, children, friends, and family). · Safely dispose of unused/unwanted prescription opioids: Find your community drug take-back program or your pharmacy mail-back program, or flush them down the toilet, following guidance from the Food and Drug Administration (www.fda.gov/Drugs/ResourcesForYou). · Visit www.cdc.gov/drugoverdose to learn about the risks of opioid abuse and overdose. · If you believe you may be struggling with addiction, tell your health care provider and ask for guidance or call [employer] at 1-068-305-HTYC. Discharge Instructions Knee Arthroscopy: What to Expect at [facility] Your Recovery Arthroscopy is a way to find problems and do surgery inside a joint without making a large cut (incision). Your doctor put a lighted tube with a tiny camera-called an arthroscope, or scope-and surgical tools through small incisions in your knee. You will feel tired for several days. Your knee will be swollen, and you may notice that your skin is a different color near the cuts (incisions). The swelling is normal and will start to go away in a few days. Keeping your leg higher than your heart will help with swelling and pain. You will probably need about 6 weeks to recover. If your doctor repaired damaged tissue, recovery will take longer. You may have to limit your activity until your knee strength and movement return to normal. You may also be in a physical rehabilitation (rehab) program. 
You may be able to return to a desk job or your normal routine in a few days. But if you do physical labor, it may be as long as 2 months before you can return to work. This care sheet gives you a general idea about how long it will take for you to recover. But each person recovers at a different pace. Follow the steps below to get better as quickly as possible. How can you care for yourself at home? Activity ? · Rest when you feel tired. Getting enough sleep will help you recover. Use pillows to raise your ankle and leg above the level of your heart. ? · Try to walk each day, after your doctor has said you can. Start by walking a little more than you did the day before. Bit by bit, increase the amount you walk. Walking boosts blood flow and helps prevent pneumonia and constipation. ? · You may have a brace or crutches or both. ? · Your doctor will tell you how often and how much you can move your leg and knee. ? · If you have a desk job, you may be able to return to work a few days after the surgery. If you lift things or stand or walk a lot at work, it may be as long as 2 months before you can return.   
? · You can take a shower 48 to 72 hours after surgery and clean the incisions with regular soap and water. Do not take a bath or soak your knee until your doctor says it is okay. ? · Ask your doctor when you can drive again. ? · If you had a repair of torn tissue, follow your doctor's instructions for lifting things or moving your knee. Diet ? · You can eat your normal diet. If your stomach is upset, try bland, low-fat foods like plain rice, broiled chicken, toast, and yogurt. ? · Drink plenty of fluids, unless your doctor tells you not to. ? · You may notice that your bowel movements are not regular right after your surgery. This is common. Try to avoid constipation and straining with bowel movements. You may want to take a fiber supplement every day. If you have not had a bowel movement after a couple of days, ask your doctor about taking a mild laxative. Medicines ? · Your doctor will tell you if and when you can restart your medicines. He or she will also give you instructions about taking any new medicines. ? · If you take blood thinners, such as warfarin (Coumadin), clopidogrel (Plavix), or aspirin, be sure to talk to your doctor. He or she will tell you if and when to start taking those medicines again. Make sure that you understand exactly what your doctor wants you to do. ? · Be safe with medicines. Take pain medicines exactly as directed. ¨ If the doctor gave you a prescription medicine for pain, take it as prescribed. ¨ If you are not taking a prescription pain medicine, ask your doctor if you can take an over-the-counter medicine. ? · If you think your pain medicine is making you sick to your stomach: 
¨ Take your medicine after meals (unless your doctor has told you not to). ¨ Ask your doctor for a different pain medicine. ? · If your doctor prescribed antibiotics, take them as directed. Do not stop taking them just because you feel better. You need to take the full course of antibiotics. Incision care ? · If you have a dressing over your cuts (incisions), keep it clean and dry. You may remove it 48 to 72 hours after the surgery. ? · If your incisions are open to the air, keep the area clean and dry. ? · If you have strips of tape on the incisions, leave the tape on for a week or until it falls off. Exercise ? · Move your toes and ankle as much as your bandages will allow. ? · Bend and straighten your knee slowly several times during the day. ? · Depending on why you had the surgery, you may have to do ankle and leg exercises. Your doctor or physical therapist will give you exercises as part of a rehabilitation program.  
? · Stop any activity that causes sharp pain. Talk to your doctor or physical therapist about what sports or other exercise you can do. Ice and elevation ? · To reduce swelling and pain, put ice or a cold pack on your knee for 10 to 20 minutes at a time. Do this every 1 to 2 hours. Put a thin cloth between the ice and your skin. Follow-up care is a key part of your treatment and safety. Be sure to make and go to all appointments, and call your doctor if you are having problems. It's also a good idea to know your test results and keep a list of the medicines you take. When should you call for help? Call 911 anytime you think you may need emergency care. For example, call if: 
? · You passed out (lost consciousness). ? · You have severe trouble breathing. ? · You have sudden chest pain and shortness of breath, or you cough up blood. ?Call your doctor now or seek immediate medical care if: 
? · Your foot or toes are numb or tingling. ? · Your foot is cool or pale, or it changes color. ? · You have signs of a blood clot, such as: 
¨ Pain in your calf, back of the knee, thigh, or groin. ¨ Redness and swelling in your leg or groin. ? · You are sick to your stomach or cannot keep fluids down. ? · You have pain that does not get better after you take pain medicine. ? · You have loose stitches, or your incision comes open. ? · Bright red blood has soaked through the bandage over your incision. ? · You have signs of infection, such as: 
¨ Increased pain, swelling, warmth, or redness. ¨ Red streaks leading from the incisions. ¨ Pus draining from the incisions. ¨ A fever. ? Watch closely for any changes in your health, and be sure to contact your doctor if: 
? · You do not have a bowel movement after taking a laxative. Where can you learn more? Go to http://rivka-jarrell.info/. Enter P102 in the search box to learn more about \"Knee Arthroscopy: What to Expect at Home. \" Current as of: March 21, 2017 Content Version: 11.4 © 5689-8085 [employer]. Care instructions adapted under license by [employer] (which disclaims liability or warranty for this information). If you have questions about a medical condition or this instruction, always ask your healthcare professional. [place] [address] any warranty or liability for your use of this information. DISCHARGE SUMMARY from Nurse The following personal items collected during your admission are returned to you:  
Dental Appliance: Dental Appliances: None Vision: Visual Aid: None Hearing Aid:   
Jewelry: Jewelry: None Clothing: Clothing: Pants, Undergarments, Jacket/Coat, Footwear, Shirt Other Valuables: Other Valuables: None Valuables sent to safe:   
 
 
PATIENT INSTRUCTIONS: 
 
 
F-face looks uneven A-arms unable to move or move unevenly S-speech slurred or non-existent T-time-call 911 as soon as signs and symptoms begin-DO NOT go  
 Back to bed or wait to see if you get better-TIME IS BRAIN. The discharge information has been reviewed with the patient. The patient verbalized understanding. MyChart Activation Thank you for requesting access to [employer] Please follow the instructions below to securely access and download your online medical record. [employer] allows you to send messages to your doctor, view your test results, renew your prescriptions, schedule appointments, and more. How Do I Sign Up? 1. In your internet browser, go to [email or web address]. 2. Click on the First Time User? Click Here link in the Sign In box. You will see the New Member Sign Up page. 3. Enter your [employer] Access Code exactly as it appears below. You will not need to use this code after youve completed the sign-up process. If you do not sign up before the expiration date, you must request a new code. [employer] Access Code: [email or web address] Expires: 2012  9:42 AM (This is the date your [employer] access code will ) 4. Enter the last four digits of your Social Security Number (xxxx) and Date of Birth (mm/dd/yyyy) as indicated and click Submit. You will be taken to the next sign-up page. 5. Create a [employer] ID. This will be your [employer] login ID and cannot be changed, so think of one that is secure and easy to remember. 6. Create a [employer] password. You can change your password at any time. 7. Enter your Password Reset Question and Answer. This can be used at a later time if you forget your password. 8. Enter your e-mail address. You will receive e-mail notification when new information is available in [address] 9. Click Sign Up. You can now view and download portions of your medical record. 10. Click the Download Summary menu link to download a portable copy of your medical information. Additional Information If you have questions, please visit the Frequently Asked Questions section of the [employer] website at [email or web address] Remember, MyChart is NOT to be used for urgent needs. For medical emergencies, dial 911. Armband removed and shredded Discharge Instructions for Total Knee Replacement Patients · The dressing on your knee will be changed by the Home Health professional at the appropriate time. Keep your incision clean and dry. Do not apply any ointments to the incision. · You may shower as long as you keep you incision dry. When showering, leave your dressing on. The dressing is waterproof as long as the edges are sealed. · Notify your surgeon if: 
· Your temperature is greater than 100.5 · You have pain not controlled by your pain medication · You have increased drainage from your incision · You have increased redness or swelling in your leg · You have chest pain, shortness of breath, or any other problems · Do your exercises as instructed by the home physical therapist. 
 
· Bend and straighten your operative leg every hour. Walk once an hour during normal walking hours. · During periods of inactivity or rest, your leg should be elevated with a rolled towel or folded pillow under the heel to keep the knee straight. · Do not place anything under the knee. · Do not sit in a recliner chair with the footrest elevated. · You may use ice to your knee for 20-30 minutes after exercise and as needed. Do not apply the ice pack directly to your skin. Use a barrier such as your pant leg or a thin towel. · If you have LORETO hose (the white support stockings), remove them at bedtime and re-apply the hose in the morning for the next 2 weeks. Best of luck with your new knee and [address] YOU for choosing the [address]! [place] Announcement We are excited to announce that we are making your provider's discharge notes available to you in [employer]in[employer].   You will see these notes when they are completed and signed by the physician that discharged you from your recent hospital stay. If you have any questions or concerns about any information you see in [employer]omx, please call the Health Information Department where you were seen or reach out to your Primary Care Provider for more information about your plan of care. Introducing [facility]! Mandi Spigalina introduces [employer] patient portal. Now you can access parts of your medical record, email your doctor's office, and request medication refills online. 1. In your internet browser, go to [email or web address] 2. Click on the First Time User? Click Here link in the Sign In box. You will see the New Member Sign Up page. 3. Enter your [employer] Access Code exactly as it appears below. You will not need to use this code after youve completed the sign-up process. If you do not sign up before the expiration date, you must request a new code. · [employer] Access Code: [email or web address] Expires: 5/17/2018 10:23 AM 
 
4. Enter the last four digits of your Social Security Number (xxxx) and Date of Birth (mm/dd/yyyy) as indicated and click Submit. You will be taken to the next sign-up page. 5. Create a [employer] ID. This will be your [employer] login ID and cannot be changed, so think of one that is secure and easy to remember. 6. Create a [employer] password. You can change your password at any time. 7. Enter your Password Reset Question and Answer. This can be used at a later time if you forget your password. 8. Enter your e-mail address. You will receive e-mail notification when new information is available in [address] 9. Click Sign Up. You can now view and download portions of your medical record. 10. Click the Download Summary menu link to download a portable copy of your medical information.  
 
If you have questions, please visit the Frequently Asked Questions section of the [employer]. Remember, MyChart is NOT to be used for urgent needs. For medical emergencies, dial 911. Now available from your iPhone and Android! Introducing Jason Olmedo As a Lumenergijeet Noelic patient, I wanted to make you aware of our electronic visit tool called Jason Olmedo. [employer]ie Magic [employer] allows you to connect within minutes with a medical provider 24 hours a day, seven days a week via a mobile device or tablet or logging into a secure website from your computer. You can access Jason Hirschfin from anywhere in the [place]. A virtual visit might be right for you when you have a simple condition and feel like you just dont want to get out of bed, or cant get away from work for an appointment, when your regular Tanya Noelic provider is not available (evenings, weekends or holidays), or when youre out of town and need minor care. Electronic visits cost only $49 and if the [employer] provider determines a prescription is needed to treat your condition, one can be electronically transmitted to a nearby pharmacy*. Please take a moment to enroll today if you have not already done so. The enrollment process is free and takes just a few minutes. To enroll, please download the [employer] deepa to your tablet or phone, or visit [email or web address] to enroll on your computer. And, as an [address] [place] [address] patient with a [employer] account, the results of your visits will be scanned into your electronic medical record and your primary care provider will be able to view the scanned results. We urge you to continue to see your regular Tanya Scruggs provider for your ongoing medical care. And while your primary care provider may not be the one available when you seek a Jason Olmedo virtual visit, the peace of mind you get from getting a real diagnosis real time can be priceless. For more information on Jason Olmedo, view our Frequently Asked Questions (FAQs) at [email or web address]. Sincerely, 
 
Jose West MD 
Chief Medical Officer Luann Snyder *:  certain medications cannot be prescribed via Jason Olmedo Providers Seen During Your Hospitalization Provider Specialty Primary office phone Jamin Ortiz, [address] [facility] Orthopedic Surgery [phone] Your Primary Care Physician (PCP) Primary Care Physician Office Phone Office Fax [phone], [address] [place] [address] [phone] You are allergic to the following Allergen Reactions Augmentin (Amoxicillin-Pot Clavulanate) Itching Hibiclens (Chlorhexidine Gluconate) Itching Penicillins Rash Recent Documentation Height Weight BMI Smoking Status 1.778 m 100.5 kg 31.8 kg/m2 Never Smoker Emergency Contacts Name Discharge Info Relation Home Work Mobile [id number] [address] CAREGIVER [3] Spouse [3] [phone] Patient Belongings The following personal items are in your possession at time of discharge: 
  Dental Appliances: None  Visual Aid: None      Home Medications: None   Jewelry: None  Clothing: Pants, Undergarments, Jacket/Coat, Footwear, Shirt    Other Valuables: None Discharge Instructions Attachments/References WARFARIN (BY MOUTH) (ENGLISH) CELECOXIB (BY MOUTH) (ENGLISH) OXYCODONE/ACETAMINOPHEN (BY MOUTH) (ENGLISH) ENOXAPARIN (BY INJECTION) (ENGLISH) Patient Handouts Warfarin (By mouth) Warfarin (WAR-far-in) Prevents and treats blood clots. May lower the risk of serious complications after a heart attack. This medicine is a blood thinner. Brand Name(s): Coumadin, Jessica Avelar There may be other brand names for this medicine. When This Medicine Should Not Be Used: This medicine is not right for everyone.  Do not use it if you had an allergic reaction to warfarin, if you are pregnant, or if you have health problems that could cause bleeding. How to Use This Medicine:  
Tablet · Take your medicine as directed. Your dose may need to be changed several times to find what works best for you. · This medicine should come with a Medication Guide. Ask your pharmacist for a copy if you do not have one. · Missed dose: Take a dose as soon as you remember. If it is almost time for your next dose, wait until then and take a regular dose. Do not take extra medicine to make up for a missed dose. · Store the medicine in a closed container at room temperature, away from heat, moisture, and direct light. Drugs and Foods to Avoid: Ask your doctor or pharmacist before using any other medicine, including over-the-counter medicines, vitamins, and herbal products. · Many medicines and foods can affect how warfarin works and may affect the PT/INR test results. Tell your doctor before you start or stop any medicine, especially the following:  
¨ Co-enzyme R03, echinacea, garlic, ginkgo, ginseng, goldenseal, or Myrtle's wort ¨ Another blood thinner, including apixaban, argatroban, bivalirudin, cilostazol, clopidogrel, dabigatran, desirudin, dipyridamole, heparin, lepirudin, prasugrel, rivaroxaban, ticlopidine ¨ Medicine to treat depression or anxiety, including citalopram, desvenlafaxine, duloxetine, escitalopram, fluoxetine, fluvoxamine, milnacipran, paroxetine, sertraline, venlafaxine, vilazodone ¨ Medicine to treat an infection ¨ NSAID pain or arthritis medicine, including aspirin, celecoxib, diclofenac, diflunisal, fenoprofen, ibuprofen, indomethacin, ketoprofen, ketorolac, mefenamic acid, naproxen, oxaprozin, piroxicam, sulindac. Check labels for over-the-counter medicines to find out if they contain an NSAID. ¨ Steroid medicine, including dexamethasone, hydrocortisone, methylprednisolone, prednisolone, prednisone · Warfarin works best if you eat about the same amount of vitamin K every day. Foods high in vitamin K include asparagus, broccoli, brussels sprouts, cabbage, green leafy vegetables, plums, rhubarb, and canola oil. Talk to your doctor before you make changes to your normal diet. · Do not eat grapefruit or drink grapefruit juice while you are using this medicine. Warnings While Using This Medicine: · It is not safe to take this medicine during pregnancy. It could harm an unborn baby. Tell your doctor right away if you become pregnant. Use an effective form of birth control to keep from getting pregnant during treatment and for at least 1 month after your last dose. · Tell your doctor if you are breastfeeding, or if you have kidney disease, liver disease, heart disease, diabetes, heart failure, high blood pressure, an infection, a stomach ulcer, or protein C deficiency. Also tell your doctor if you had recent surgery or an injury, or a history of stroke, anemia, severe bleeding or bruising, or problems caused by heparin use. · This medicine may cause the following problems: ¨ Bleeding, which may be life-threatening ¨ Gangrene (skin or tissue damage) ¨ Calciphylaxis or calcium uremic arteriolopathy ¨ Kidney problems, including acute kidney injury ¨ Purple toes syndrome · You must have a PT/INR blood test while you use this medicine to check how well your blood is clotting. Your doctor will use the test results to make sure the medicine is working properly. Keep all appointments for the PT/INR blood tests. · You may bleed or bruise more easily with warfarin. To prevent injury or cuts, do not play rough sports, be careful with sharp objects, and brush and floss your teeth gently. Ennice Carbine your nose gently, and do not pick your nose. · Carry an ID card or wear a medical alert bracelet to let emergency caregivers know that you use warfarin.  
· Tell any doctor or dentist who treats you that you are using this medicine. You may need to stop using this medicine several days before you have surgery or medical tests. · Keep all medicine out of the reach of children. Never share your medicine with anyone. Possible Side Effects While Using This Medicine:  
Call your doctor right away if you notice any of these side effects: · Allergic reaction: Itching or hives, swelling in your face or hands, swelling or tingling in your mouth or throat, chest tightness, trouble breathing · Bleeding from your gums or nose, coughing up blood, heavy monthly periods · Bleeding that does not stop, bruising, or weakness · Dizziness, fainting, lightheadedness · Pain, brown or black skin, or skin that is cool to the touch · Purple toes or feet, or new pain in your leg, foot, or toes · Purplish red, net-like, blotchy spots on the skin · Red or dark brown urine, or red or black, tarry stools · Vomiting blood or material that looks like coffee grounds If you notice other side effects that you think are caused by this medicine, tell your doctor. Call your doctor for medical advice about side effects. You may report side effects to FDA at 7-494-FDA-2100 © 2017 [facility] Information is for End User's use only and may not be sold, redistributed or otherwise used for commercial purposes. The above information is an  only. It is not intended as medical advice for individual conditions or treatments. Talk to your doctor, nurse or pharmacist before following any medical regimen to see if it is safe and effective for you. Celecoxib (By mouth) Celecoxib (teq-d-APU-ib) Treats pain. This medicine is an NSAID. Brand Name(s): CeleBREX, SmartRx CapXib Kit There may be other brand names for this medicine. When This Medicine Should Not Be Used: This medicine is not right for everyone.  Do not use it if you had an allergic reaction (including asthma) to celecoxib, aspirin, NSAIDs, or a sulfa drug (such as sulfamethoxazole). Do not use this medicine right before or right after coronary artery bypass graft (CABG). How to Use This Medicine:  
Capsule · Your doctor will tell you how much medicine to use. Do not use more than directed. · It is best to take this medicine with food or milk so it does not upset your stomach. · Use this medicine for the shortest time possible and in the smallest dose possible. This will help lower the risk of side effects. · If you cannot swallow the capsule, you may open it and pour the medicine into a teaspoon of applesauce. Stir the mixture well and swallow right away. Drink enough water to make sure you swallow all of the medicine. · This medicine should come with a Medication Guide. Ask your pharmacist for a copy if you do not have one. · Missed dose: Take a dose as soon as you remember. If it is almost time for your next dose, wait until then and take a regular dose. Do not take extra medicine to make up for a missed dose. · Store the medicine in a closed container at room temperature, away from heat, moisture, and direct light. Any medicine that has been mixed with applesauce may be stored in a refrigerator and used within 6 hours. Drugs and Foods to Avoid: Ask your doctor or pharmacist before using any other medicine, including over-the-counter medicines, vitamins, and herbal products. · Some medicines and foods can affect how celecoxib works. Tell your doctor if you are taking any of the following: ¨ A blood thinner, such as warfarin ¨ A diuretic (water pill), such as furosemide, hydrochlorothiazide (HCTZ), torsemide ¨ Blood pressure medicine, such as enalapril, lisinopril, losartan, olmesartan, valsartan ¨ Fluconazole ¨ Lithium ¨ Other pain or arthritis medicine, such as aspirin, diclofenac, ibuprofen, naproxen ¨ Steroid medicine, such as hydrocortisone, methylprednisolone, prednisone · Do not drink alcohol while you are using this medicine. Warnings While Using This Medicine: · Tell your doctor if you are pregnant or breastfeeding. Do not use this medicine during the later part of pregnancy, unless your doctor tells you to. · Tell your doctor if you have a history of ulcers or other stomach problems, kidney or liver disease, anemia, aspirin-sensitive asthma, high blood pressure, congestive heart failure, or other heart or circulation problems. · This medicine may cause the following problems: ¨ A serious liver problem ¨ Bleeding in your stomach or intestines ¨ Increased risk for a heart attack or stroke ¨ Risk for disseminated intravascular coagulation (bleeding problem) in children younger than 18 years · Tell any doctor or dentist who treats you that you are using this medicine. · Your doctor will do lab tests at regular visits to check on the effects of this medicine. Keep all appointments. · Keep all medicine out of the reach of children. Never share your medicine with anyone. Possible Side Effects While Using This Medicine:  
Call your doctor right away if you notice any of these side effects: · Allergic reaction: Itching or hives, swelling in your face or hands, swelling or tingling in your mouth or throat, chest tightness, trouble breathing · Blistering, peeling, red skin rash · Bloody or black, tarry stools · Change in how much or how often you urinate, bloody or cloudy urine · Chest pain, shortness of breath, or coughing up blood · Fast or slow heartbeat · Dark urine or pale stools, nausea, vomiting, loss of appetite, stomach pain, yellow skin or eyes · Numbness or weakness in your arm or leg, or on one side of your body · Pain in your calf · Shortness of breath, cold sweat, and bluish skin · Sudden or severe headache, dizziness, or problems with vision, speech, or walking · Swelling in your hands, ankles, or feet, rapid weight gain · Unusual tiredness or weakness, pale skin · Vomiting blood or material that looks like coffee grounds If you notice these less serious side effects, talk with your doctor: · Mild skin rash · Muscle or joint pain If you notice other side effects that you think are caused by this medicine, tell your doctor. Call your doctor for medical advice about side effects. You may report side effects to FDA at 9-366-JLG-7773 © 2017 [facility] Information is for End User's use only and may not be sold, redistributed or otherwise used for commercial purposes. The above information is an  only. It is not intended as medical advice for individual conditions or treatments. Talk to your doctor, nurse or pharmacist before following any medical regimen to see if it is safe and effective for you. Oxycodone/Acetaminophen (By mouth) Acetaminophen (j-bfem-j-MIN-oh-fen), Oxycodone Hydrochloride (mq-u-RDQ-done geovany-droe-KLOR-melisa) Treats moderate to moderately severe pain. This medicine is a narcotic pain reliever. Brand Name(s): Endocet, Percocet, Primlev, Xartemis XR There may be other brand names for this medicine. When This Medicine Should Not Be Used: This medicine is not right for everyone. Do not use it if you had an allergic reaction to acetaminophen or oxycodone, or if you have serious breathing problems or paralytic ileus. How to Use This Medicine:  
Capsule, Liquid, Tablet, Long Acting Tablet · Your doctor will tell you how much medicine to use. Do not use more than directed. · An overdose can be dangerous. Follow directions carefully so you do not get too much medicine at one time. · Oral liquid: Measure the oral liquid medicine with a marked measuring spoon, oral syringe, or medicine cup. · Swallow the extended-release tablet whole. Do not crush, break, or chew it. Do not lick or wet the tablet before placing it in your mouth. Do not give this medicine through a feeding tube. · This medicine should come with a Medication Guide. Ask your pharmacist for a copy if you do not have one. · Missed dose: If you miss a dose of this medicine, skip the missed dose and go back to your regular dosing schedule. Do not double doses. · Store the medicine in a closed container at room temperature, away from heat, moisture, and direct light. Ask your pharmacist about the best way to dispose of medicine you do not use. Drugs and Foods to Avoid: Ask your doctor or pharmacist before using any other medicine, including over-the-counter medicines, vitamins, and herbal products. · Do not use Xartemis XR if you are using or have used an MAO inhibitor in the past 14 days. · Some medicines can affect how this medicine works. Tell your doctor if you are using any of the following: ¨ Carbamazepine, erythromycin, ketoconazole, lamotrigine, mirtazapine, naltrexone, phenytoin, propranolol, rifampin, ritonavir, tramadol, trazodone, or zidovudine ¨ Birth control pills ¨ Diuretic (water pill) ¨ Medicine to treat depression ¨ Phenothiazine medicine ¨ Triptan medicine to treat migraine headaches · Do not drink alcohol while you are using this medicine. Acetaminophen can damage your liver, and alcohol can increase this risk. Do not take acetaminophen without asking your doctor if you have 3 or more drinks of alcohol every day. · Tell your doctor if you use anything else that makes you sleepy. Some examples are allergy medicine, narcotic pain medicine, and alcohol. Tell your doctor if you are using buprenorphine, butorphanol, nalbuphine, pentazocine, a benzodiazepine, or a muscle relaxer. Warnings While Using This Medicine: · Tell your doctor if you are pregnant or breastfeeding, or if you have kidney disease, liver disease, heart disease, low blood pressure, breathing problems or lung disease (such as asthma, COPD), thyroid problems, Minh disease, pancreas or gallbladder problems, prostate problems, trouble urinating, or a stomach problems, or a history of head injury or brain damage, seizures, or alcohol or drug abuse. Tell your doctor if you are allergic to codeine. · This medicine may cause the following problems: 
¨ High risk of overdose, which can lead to death ¨ Respiratory depression (serious breathing problem that can be life-threatening) ¨ Liver problems ¨ Serious skin reactions ¨ Serotonin syndrome (when used with certain medicines) · This medicine may make you dizzy or drowsy. Do not drive or do anything that could be dangerous until you know how this medicine affects you. Sit or lie down if you feel dizzy. Stand up carefully. · This medicine contains acetaminophen. Read the labels of all other medicines you are using to see if they also contain acetaminophen, or ask your doctor or pharmacist. Afshan Rodríguez not use more than 4 grams (4,000 milligrams) total of acetaminophen in one day. · This medicine can be habit-forming. Do not use more than your prescribed dose. Call your doctor if you think your medicine is not working. · Do not stop using this medicine suddenly. Your doctor will need to slowly decrease your dose before you stop it completely. · This medicine could cause infertility. Talk with your doctor before using this medicine if you plan to have children. · This medicine may cause constipation, especially with long-term use. Ask your doctor if you should use a laxative to prevent and treat constipation. · Keep all medicine out of the reach of children. Never share your medicine with anyone. Possible Side Effects While Using This Medicine:  
Call your doctor right away if you notice any of these side effects: · Allergic reaction: Itching or hives, swelling in your face or hands, swelling or tingling in your mouth or throat, chest tightness, trouble breathing · Anxiety, restlessness, fast heartbeat, fever, muscle spasms, twitching, diarrhea, seeing or hearing things that are not there · Blistering, peeling, red skin rash · Blue lips, fingernails, or skin · Dark urine or pale stools, loss of appetite, stomach pain, yellow skin or eyes · Extreme weakness, shallow breathing, uneven heartbeat, seizures, sweating, or cold or clammy skin · Severe confusion, lightheadedness, dizziness, or fainting · Severe constipation, nausea, or vomiting · Trouble breathing or slow breathing If you notice these less serious side effects, talk with your doctor:  
· Headache · Mild constipation, nausea, or vomiting · Mild sleepiness or drowsiness If you notice other side effects that you think are caused by this medicine, tell your doctor. Call your doctor for medical advice about side effects. You may report side effects to FDA at 8-946-FDA-4492 © 2017 [address] Information is for End User's use only and may not be sold, redistributed or otherwise used for commercial purposes. The above information is an  only. It is not intended as medical advice for individual conditions or treatments. Talk to your doctor, nurse or pharmacist before following any medical regimen to see if it is safe and effective for you. Enoxaparin (By injection) Enoxaparin (ee-nox-a-PAR-in) Prevents and treats blood clots. Also treats heart attacks. This medicine is a blood thinner. Brand Name(s): Amerinet Choice Enoxaparin Sodium, Enoxaparin Sodium Novaplus, Lovenox, PremierPro Rx Lovenox There may be other brand names for this medicine. When This Medicine Should Not Be Used: You should not use this medicine if you have had an allergic reaction to enoxaparin, heparin, benzyl alcohol, or products made from pork. You should not use enoxaparin if you have bleeding disorders or any active bleeding. How to Use This Medicine:  
Injectable · Your doctor will prescribe your exact dose and tell you how often it should be given. This medicine is given as a shot under your skin. · A nurse or other health provider will give you this medicine. It may also be given by a home health caregiver. · You may be taught how to give your medicine at home. Make sure you understand all instructions before giving yourself an injection. Do not use more medicine or use it more often than your doctor tells you to. · You will be shown the body areas where this shot can be given. Use a different body area each time you give yourself a shot. Keep track of where you give each shot to make sure you rotate body areas. · Use a new needle and syringe each time you inject your medicine. If a dose is missed:  
· You must use this medicine on a fixed schedule. Call your doctor or pharmacist if you miss a dose. How to Store and Dispose of This Medicine: · If you store this medicine at home, keep it at room temperature, away from heat and direct light. · If you were given a bottle of medicine to use with your syringes, you must use the medicine within 28 days after the first shot. Throw away the unused medicine in the bottle after 28 days. · Throw away used needles in a hard, closed container that the needles cannot poke through. Keep this container away from children and pets. · Ask your pharmacist, doctor, or health caregiver about the best way to dispose of any leftover medicine, containers, and other supplies. Throw away old medicine after the expiration date has passed. · Keep all medicine out of the reach of children. Never share your medicine with anyone. Drugs and Foods to Avoid: Ask your doctor or pharmacist before using any other medicine, including over-the-counter medicines, vitamins, and herbal products. · Make sure your doctor knows if you are also using blood thinners (such as clopidogrel, warfarin, or Coumadin®).  Tell your doctor if you are also using dipyridamole (Persantine®), ketorolac (Toradol®), or sulfinpyrazone (Anturane®). · Make sure your doctor knows if you are using pain or arthritis medicine (such as aspirin, Advil®, Aleve®, Motrin®, Orudis®, Dolobid®, West lizzie, Indocin®, Relafen®, or Voltaren®). Avoid taking aspirin or medicines that contain aspirin, unless your doctor tells you to. Warnings While Using This Medicine: · Make sure your doctor knows if you are pregnant or breastfeeding, or if you have liver disease, kidney disease, blood vessel problems, diabetes, a heart infection, uncontrolled high blood pressure, a stomach ulcer or bleeding, or a bleeding disorder such as hemophilia. Tell your doctor if you have a bleeding disorder caused by heparin. · Make sure your doctor knows if you have recently had a stroke, or surgery on your eyes, brain, or spine. Tell your doctor if you have had a heart valve replaced. · This medicine may cause bleeding or bruising. This risk is higher if you have a catheter in your back for pain medicine or anesthesia (sometimes called an \"epidural\"), or if you have kidney problems. The risk of bleeding increases if your kidney problems get worse. Discuss this with your doctor if you are concerned. · You may bleed and bruise more easily while you are using this medicine. Be extra careful to avoid injuries until the effects of the medicine have worn off. Stay away from rough sports or other situations where you could be bruised, cut, or injured. Brush and floss your teeth gently. Be careful when using sharp objects, including razors and fingernail clippers. Avoid picking your nose. If you need to blow your nose, blow it gently. · Watch for any bleeding from open areas such as around the injection site. Also check for blood in your urine or stool. If you have any bleeding or injuries, tell your doctor right away. · Tell any doctor or dentist who treats you that you are using this medicine.  You may need to stop using this medicine several days before you have surgery or medical tests. · Your doctor will do lab tests at regular visits to check on the effects of this medicine. Keep all appointments. Possible Side Effects While Using This Medicine:  
Call your doctor right away if you notice any of these side effects: · Allergic reaction: Itching or hives, swelling in your face or hands, swelling or tingling in your mouth or throat, chest tightness, trouble breathing · Blood in your urine. · Bloody or black, tarry stools. · Chest pain, shortness of breath, or coughing up blood. · Fever. · Large, flat, blue or purplish patches in the skin. · Numbness or weakness in your arm or leg, or on one side of your body. · Pain in your lower leg (calf). · Sudden or severe headache, problems with vision, speech, or walking. · Swelling in your hands, ankles, or feet. · Uneven heartbeat. · Unusual bleeding or bruising. · Vomiting blood or material that looks like coffee grounds. · Warmth or redness in your face, neck, arms, or upper chest. 
If you notice these less serious side effects, talk with your doctor: · Confusion. · Nausea or diarrhea. · Pain, redness, bruising, swelling, or a lump under your skin where the shot was given. If you notice other side effects that you think are caused by this medicine, tell your doctor. Call your doctor for medical advice about side effects. You may report side effects to FDA at 9-740-FDA-3425 © 2017 [address] Information is for End User's use only and may not be sold, redistributed or otherwise used for commercial purposes. The above information is an  only. It is not intended as medical advice for individual conditions or treatments. Talk to your doctor, nurse or pharmacist before following any medical regimen to see if it is safe and effective for you. Please provide this summary of care documentation to your next provider. Signatures-by signing, you are acknowledging that this After Visit Summary has been reviewed with you and you have received a copy. Patient Signature:  ____________________________________________________________ Date:  ____________________________________________________________  
  
Lawerence Bridgette Provider Signature:  ____________________________________________________________ Date:  ____________________________________________________________

## 2018-03-27 NOTE — ANESTHESIA PROCEDURE NOTES
Peripheral Block    Start time: 3/27/2018 9:40 AM  End time: 3/27/2018 9:50 AM  Performed by: Venessa Duane  Authorized by: Venessa Duane       Pre-procedure: Indications: at surgeon's request, post-op pain management and procedure for pain    Preanesthetic Checklist: patient identified, risks and benefits discussed, site marked, timeout performed, anesthesia consent given and patient being monitored    Timeout time: see nursing note. Block Type:   Block Type: Adductor canal  Laterality:  Left  Monitoring:  Standard ASA monitoring, continuous pulse ox, frequent vital sign checks, oxygen, heart rate and responsive to questions  Injection Technique:  Single shot  Procedures: ultrasound guided and nerve stimulator    Patient Position: supine  Prep: chlorhexidine    Location:  Upper thigh  Needle Type:  Stimuplex  Needle Gauge:  21 G  Needle Localization:  Ultrasound guidance and nerve stimulator  Medication Injected:  0.2%  ropivacaine  Volume (mL):  30  Add'l Medication Injected:  1.0%  lidocaine  Volume (mL):  1    Assessment:  Number of attempts:  1  Injection Assessment:  No paresthesia, incremental injection every 5 mL, ultrasound image on chart, no intravascular symptoms, negative aspiration for blood and local visualized surrounding nerve on ultrasound  Patient tolerance:  Patient tolerated the procedure well with no immediate complications  Location:  PREOP HOLDING    Patient given 2 mg IV Versed and 100 mcg IV Fentanyl for sedation.     3/27/2018     9:50 AM     Gricelda Pop MD

## 2018-03-27 NOTE — PROGRESS NOTES
Rounded on patient post total knee replacement. Activity: Reinforced importance of getting OOB for all meals, going to bathroom to help prevent blood clots. VTE prophylaxis: Instructed patient to use their SCD's when not up and walking. To use while in bed and in the chair. Educated re: ankle pumps to assist with circulation when in hospital and at home. Medications: Reviewed pain medications patient is taking and the importance of keeping pain under control to help with getting OOB and therapy. Reminded the patient to always eat a snack with their pain medication to help to prevent nausea. Encouraged patient to monitor for constipation and to take a stool softner/laxative while recovering and on pain medication. Incentive Spirometry: Reinforced use of incentive spirometer with return demonstration by patient. Wound Care: Dressing to surgical site . Patient instructed not to take dressing off at home. Patient given CHG wash to use in hospital and at home. Stressed importance of using a clean towel and washcloth daily. Reminded to put on clean clothes and night clothes daily. Ice Protocol: Ice man machine in place per protocol. Patient Safety: Call light in reach. Patient  reminded to call for help toget OOB or when leaving bathroom for safety. Phone and other items also within reach per patient's request.     Diet: Educated patient on the importance of eating three well balanced meals a day with protein to promote bone/muscle healing. Reminded patient to drink lots of fluids to protect kidneys from all the medications being taken currently with recovery. Patient given educational material to remind them to continue doing everything at home to prevent complications and have a successful recovery. Patient  verbalized understand of all information and education discussed. Patient  given the opportunity for asking questions.     Mobility Intervention: stood on side of bed to urinate taking 2 steps with staff.         [x] Pt dangled at edge of bed    [] Pt assisted OOB to bedside commode    [] Pt assisted OOB to chair    [] Pt ambulated to bathroom    [] Patient was ambulated in room/hallway    Assistive Device Utilized:       [] Rolling walker   [] Crutches   [] Straight Cane   [] Knee immobilizer   [] IV pole    After Mobilization:     [] Patient left in no apparent distress sitting up in chair  [x] Patient left in no apparent distress in bed  [x] Call bell left within reach  [x] SCDs on & machine turned on  [x] Ice applied  [] RN notified  [] Caregiver present  [] Bed alarm activated    Reason patient not mobilized:      [] Patient refused   [] Nausea/vomiting   [] Low blood pressure   [] Drowsy/lethargic    Pain Rating:     [x] 0  [] 1  Assistive Device:        [] 2  [] 3  [] 4  [] 5  [] 6  Assistive Device:        [] 7  [] 8  [] 9  [] 10    Comments:

## 2018-03-27 NOTE — H&P (VIEW-ONLY)
[address] [facility], [address] [facility]  [phone]           HISTORY & PHYSICAL      Patient: Lolita Roman                MRN: [id number]       SSN: [id number]  YOB: 1955        AGE: 57 y.o. SEX: male  Body mass index is 32.31 kg/(m^2). PCP: Irene Pierce MD  03/22/18      CC: Left  knee end stage OA  Problem List Items Addressed This Visit        Other    Primary osteoarthritis of left knee - Primary      Other Visit Diagnoses     Pre-op evaluation                HPI:  The patient is a pleasant 57 y.o. whom has end stage OA of their Left knee and has failed conservative treatment including but not limited to NSAIDS, cortisone injections, viscosupplementation, PT, and pain medicine. Due to the current findings and affected activities of daily living, surgical intervention is indicated. The alternatives, risks, complications, as well as expected outcome were discussed. These include but are not limited to infection, blood loss, need for blood transfusion, neurovascular damage, DVT, PE,  post-op stiffness and pain, leg length discrepancy, dislocation, anesthetic complications, prothesis longevity, need for more surgery, MI, stroke, and even death. The patient understands and wishes to proceed with surgery.       Past Medical History:   Diagnosis Date    Arthritis     Bleeding     Blood clot in the legs     Chronic pain     knee and shoulder    Esophageal reflux     GERD (gastroesophageal reflux disease)     Headache(784.0)     migraine    High cholesterol     Hypertension     Lower back pain 11/6/2010    Other chest pain     Personal history of venous thrombosis and embolism     Pure hypercholesterolemia     Right buttock pain 11/6/2010    Right foot pain     Rotator cuff tear     left-since 2010, worsened tear Jan.    Spinal stenosis     Tendonitis, tibialis     anterior    [facility]     3 after sx last one 2000         Current Outpatient Prescriptions:     levoFLOXacin (LEVAQUIN) 750 mg tablet, Take 750 mg by mouth daily. Daily for 5 days, Disp: , Rfl: 0    LORATADINE/PSEUDOEPHEDRINE (CLARITIN-D 24 HOUR PO), Take  by mouth daily. , Disp: , Rfl:     L. gasseri-B. bifidum-B longum ([facility]) 1.5 billion cell cap, Take  by mouth daily. , Disp: , Rfl:     azelastine (ASTELIN) 137 mcg (0.1 %) nasal spray, 1 spray up each nostril twice per day, Disp: 1 Bottle, Rfl: 1    warfarin (COUMADIN) 5 mg tablet, TAKE 2 AND 1/2 TABLETS BY MOUTH DAILY OR AS DIRECTED (Patient taking differently: TAKE 2 AND 1/2 TABLETS BY MOUTH DAILY OR AS DIRECTED   taking 2 tablets  Monday, Thursday and Saturday and take 2 1/2 tablets all other days), Disp: 75 Tab, Rfl: 0    metaxalone (SKELAXIN) 800 mg tablet, Take 1 Tab by mouth three (3) times daily. Indications: Muscle Spasm (Patient taking differently: Take 800 mg by mouth three (3) times daily as needed. Indications: Muscle Spasm), Disp: 90 Tab, Rfl: 2    montelukast (SINGULAIR) 10 mg tablet, TAKE 1 TABLET BY MOUTH EVERY DAY (Patient taking differently: TAKE 1 TABLET BY MOUTH EVERY HS), Disp: 90 Tab, Rfl: 0    butalbital-acetaminophen-caff (FIORICET) -40 mg per capsule, Take 2 capsules every 8 hours as needed for headache, Disp: 540 Cap, Rfl: 3    nystatin (MYCOSTATIN) topical cream, Apply  to affected area two (2) times a day. (Patient taking differently: Apply  to affected area two (2) times daily as needed.), Disp: 15 g, Rfl: 0    lisinopril-hydroCHLOROthiazide (PRINZIDE, ZESTORETIC) 20-12.5 mg per tablet, Take 1 Tab by mouth daily. , Disp: 90 Tab, Rfl: 1    red yeast rice extract 600 mg cap, Take 1,200 mg by mouth daily. , Disp: , Rfl:     labetalol (NORMODYNE) 100 mg tablet, TAKE ONE TABLET BY MOUTH TWICE DAILY, Disp: 180 Tab, Rfl: 0    lansoprazole (PREVACID) 30 mg capsule, TAKE 1 CAPSULE DAILY BEFOREBREAKFAST, Disp: 90 Cap, Rfl: 3   raNITIdine (ZANTAC) 150 mg tablet, TK 1 T PO HS, Disp: , Rfl: 5    acetaminophen (TYLENOL) 500 mg tablet, Take 1,000 mg by mouth every six (6) hours as needed for Pain., Disp: , Rfl:     predniSONE (DELTASONE) 20 mg tablet, 2 tabs daily x 2 days, 1 tab daily x 2 days, 1/2 tab daily x 4 days, Disp: 8 Tab, Rfl: 0    Allergies   Allergen Reactions    Augmentin [Amoxicillin-Pot Clavulanate] Itching    Hibiclens [Chlorhexidine Gluconate] Itching    Penicillins Rash       Social History     Social History    Marital status:      Spouse name: N/A    Number of children: N/A    Years of education: N/A     Occupational History    Not on file. Social History Main Topics    Smoking status: Never Smoker    Smokeless tobacco: Never Used    Alcohol use No    Drug use: No    Sexual activity: Not Currently     Other Topics Concern    Not on file     Social History Narrative       Past Surgical History:   Procedure Laterality Date    FOOT/TOES SURGERY PROC UNLISTED      HX BACK SURGERY      HX ORTHOPAEDIC  06-25-12    Right foot with excision of bursa and adipose tissue from fifth metatarsal base by Dr. Alan Liu      lower back (1992 and 2000)    HX OTHER SURGICAL      left foot (2008)    LAMINOTOMY      NERVE BLOCK         Family History:  Non-contributory. PE:  Visit Vitals    /75 (BP 1 Location: Left arm, BP Patient Position: Sitting)    Pulse 79    Temp 97.2 °F (36.2 °C) (Oral)    Ht 5' 10\" (1.778 m)    Wt 225 lb 3.2 oz (102.2 kg)    SpO2 97%    BMI 32.31 kg/m2     A&O X3, NAD, well develop, well nourished  Heart: S1-S2, rrr  Lungs: CTA bilat  Abd: soft, nt, nt, + bs in all quadrants  Ext:  Pos distal pulses to DP, PT    X-ray: left knee shows end stage OA    Labs: labs were reviewed and wnl.  ua pos. Finished Levaquin yesterday    A:  Left  knee end stage OA    P:  At this point we will move forward with surgery.   Again, the alternatives, risks, complications, as well as expected outcome were discussed and the patient wishes to proceed with surgery. Pt has been instructed to stop aspirin, nsaids, rheumatologic medications and blood thinners. They have also been instructed to continue on any heart and bp meds and to take them the morning of surgery with sips of water. Will bridge coumadin with lovenox 40 mg. Will stop morning before surgery. The patient will require in patient admission due to above stated medical conditions as well the the surgical challenges given the anatomy and bone quality.          Lonnie Wiseman

## 2018-03-28 ENCOUNTER — HOME HEALTH ADMISSION (OUTPATIENT)
Dept: HOME HEALTH SERVICES | Facility: HOME HEALTH | Age: 63
End: 2018-03-28
Payer: COMMERCIAL

## 2018-03-28 VITALS
BODY MASS INDEX: 31.73 KG/M2 | DIASTOLIC BLOOD PRESSURE: 72 MMHG | WEIGHT: 221.6 LBS | HEART RATE: 77 BPM | SYSTOLIC BLOOD PRESSURE: 108 MMHG | TEMPERATURE: 97.8 F | RESPIRATION RATE: 18 BRPM | HEIGHT: 70 IN | OXYGEN SATURATION: 97 %

## 2018-03-28 LAB
BASOPHILS # BLD: 0 K/UL (ref 0–0.1)
BASOPHILS NFR BLD: 0 % (ref 0–2)
DIFFERENTIAL METHOD BLD: ABNORMAL
EOSINOPHIL # BLD: 0 K/UL (ref 0–0.4)
EOSINOPHIL NFR BLD: 0 % (ref 0–5)
ERYTHROCYTE [DISTWIDTH] IN BLOOD BY AUTOMATED COUNT: 13 % (ref 11.6–14.5)
HCT VFR BLD AUTO: 35.6 % (ref 36–48)
HGB BLD-MCNC: 11.7 G/DL (ref 13–16)
INR PPP: 1.2 (ref 0.8–1.2)
LYMPHOCYTES # BLD: 1.3 K/UL (ref 0.9–3.6)
LYMPHOCYTES NFR BLD: 14 % (ref 21–52)
MCH RBC QN AUTO: 31.4 PG (ref 24–34)
MCHC RBC AUTO-ENTMCNC: 32.9 G/DL (ref 31–37)
MCV RBC AUTO: 95.4 FL (ref 74–97)
MONOCYTES # BLD: 1.2 K/UL (ref 0.05–1.2)
MONOCYTES NFR BLD: 13 % (ref 3–10)
NEUTS SEG # BLD: 7 K/UL (ref 1.8–8)
NEUTS SEG NFR BLD: 73 % (ref 40–73)
PLATELET # BLD AUTO: 167 K/UL (ref 135–420)
PMV BLD AUTO: 10.7 FL (ref 9.2–11.8)
PROTHROMBIN TIME: 14.9 SEC (ref 11.5–15.2)
RBC # BLD AUTO: 3.73 M/UL (ref 4.7–5.5)
WBC # BLD AUTO: 9.6 K/UL (ref 4.6–13.2)

## 2018-03-28 PROCEDURE — 85025 COMPLETE CBC W/AUTO DIFF WBC: CPT | Performed by: ORTHOPAEDIC SURGERY

## 2018-03-28 PROCEDURE — 97116 GAIT TRAINING THERAPY: CPT

## 2018-03-28 PROCEDURE — 74011250637 HC RX REV CODE- 250/637: Performed by: ORTHOPAEDIC SURGERY

## 2018-03-28 PROCEDURE — 74011250636 HC RX REV CODE- 250/636: Performed by: PHYSICIAN ASSISTANT

## 2018-03-28 PROCEDURE — 97165 OT EVAL LOW COMPLEX 30 MIN: CPT

## 2018-03-28 PROCEDURE — 74011000250 HC RX REV CODE- 250: Performed by: ORTHOPAEDIC SURGERY

## 2018-03-28 PROCEDURE — 85610 PROTHROMBIN TIME: CPT | Performed by: ORTHOPAEDIC SURGERY

## 2018-03-28 PROCEDURE — 74011250636 HC RX REV CODE- 250/636: Performed by: ORTHOPAEDIC SURGERY

## 2018-03-28 PROCEDURE — 36415 COLL VENOUS BLD VENIPUNCTURE: CPT | Performed by: ORTHOPAEDIC SURGERY

## 2018-03-28 PROCEDURE — 97110 THERAPEUTIC EXERCISES: CPT

## 2018-03-28 PROCEDURE — 97530 THERAPEUTIC ACTIVITIES: CPT

## 2018-03-28 PROCEDURE — 97535 SELF CARE MNGMENT TRAINING: CPT

## 2018-03-28 PROCEDURE — 74011000258 HC RX REV CODE- 258: Performed by: ORTHOPAEDIC SURGERY

## 2018-03-28 RX ORDER — WARFARIN 4 MG/1
8 TABLET ORAL ONCE
Status: DISCONTINUED | OUTPATIENT
Start: 2018-03-28 | End: 2018-03-28

## 2018-03-28 RX ORDER — WARFARIN 4 MG/1
8 TABLET ORAL
Status: COMPLETED | OUTPATIENT
Start: 2018-03-28 | End: 2018-03-28

## 2018-03-28 RX ORDER — OXYCODONE AND ACETAMINOPHEN 10; 325 MG/1; MG/1
1-2 TABLET ORAL
Qty: 60 TAB | Refills: 0 | Status: SHIPPED | OUTPATIENT
Start: 2018-03-28 | End: 2018-04-12 | Stop reason: ALTCHOICE

## 2018-03-28 RX ORDER — CELECOXIB 200 MG/1
200 CAPSULE ORAL 2 TIMES DAILY
Qty: 60 CAP | Refills: 2 | Status: SHIPPED | OUTPATIENT
Start: 2018-03-28 | End: 2018-06-26

## 2018-03-28 RX ORDER — WARFARIN 3 MG/1
3 TABLET ORAL DAILY
Qty: 30 TAB | Refills: 0 | Status: SHIPPED | OUTPATIENT
Start: 2018-03-28 | End: 2018-04-18

## 2018-03-28 RX ORDER — ENOXAPARIN SODIUM 100 MG/ML
30 INJECTION SUBCUTANEOUS ONCE
Qty: 5 SYRINGE | Refills: 0 | Status: SHIPPED | OUTPATIENT
Start: 2018-03-28 | End: 2018-03-28

## 2018-03-28 RX ORDER — ENOXAPARIN SODIUM 100 MG/ML
30 INJECTION SUBCUTANEOUS ONCE
Status: COMPLETED | OUTPATIENT
Start: 2018-03-28 | End: 2018-03-28

## 2018-03-28 RX ADMIN — AZELASTINE HYDROCHLORIDE 1 SPRAY: 137 SPRAY, METERED NASAL at 08:37

## 2018-03-28 RX ADMIN — ENOXAPARIN SODIUM 30 MG: 30 INJECTION SUBCUTANEOUS at 10:50

## 2018-03-28 RX ADMIN — ACETAMINOPHEN 1000 MG: 500 TABLET, COATED ORAL at 08:02

## 2018-03-28 RX ADMIN — WARFARIN SODIUM 8 MG: 4 TABLET ORAL at 15:17

## 2018-03-28 RX ADMIN — CLINDAMYCIN PHOSPHATE 600 MG: 150 INJECTION, SOLUTION INTRAVENOUS at 11:20

## 2018-03-28 RX ADMIN — DOCUSATE SODIUM 100 MG: 100 CAPSULE, LIQUID FILLED ORAL at 08:03

## 2018-03-28 RX ADMIN — SODIUM CHLORIDE 100 ML/HR: 900 INJECTION, SOLUTION INTRAVENOUS at 04:21

## 2018-03-28 RX ADMIN — PANTOPRAZOLE SODIUM 40 MG: 40 TABLET, DELAYED RELEASE ORAL at 08:03

## 2018-03-28 RX ADMIN — CLINDAMYCIN PHOSPHATE 600 MG: 150 INJECTION, SOLUTION INTRAVENOUS at 04:18

## 2018-03-28 RX ADMIN — PREGABALIN 50 MG: 50 CAPSULE ORAL at 08:02

## 2018-03-28 RX ADMIN — Medication 10 ML: at 08:10

## 2018-03-28 RX ADMIN — OXYCODONE HYDROCHLORIDE 15 MG: 15 TABLET ORAL at 04:10

## 2018-03-28 RX ADMIN — ACETAMINOPHEN 1000 MG: 500 TABLET, COATED ORAL at 13:11

## 2018-03-28 RX ADMIN — LISINOPRIL AND HYDROCHLOROTHIAZIDE 1 TABLET: 12.5; 2 TABLET ORAL at 08:37

## 2018-03-28 RX ADMIN — LABETALOL HYDROCHLORIDE 100 MG: 100 TABLET, FILM COATED ORAL at 08:03

## 2018-03-28 RX ADMIN — CELECOXIB 200 MG: 100 CAPSULE ORAL at 08:03

## 2018-03-28 RX ADMIN — Medication 10 ML: at 15:17

## 2018-03-28 RX ADMIN — FERROUS SULFATE TAB 325 MG (65 MG ELEMENTAL FE) 325 MG: 325 (65 FE) TAB at 08:03

## 2018-03-28 RX ADMIN — OXYCODONE HYDROCHLORIDE 15 MG: 15 TABLET ORAL at 08:37

## 2018-03-28 NOTE — PROGRESS NOTES
Problem: Self Care Deficits Care Plan (Adult)  Goal: *Acute Goals and Plan of Care (Insert Text)  Occupational Therapy EVALUATION/discharge    Patient: Padmini Carballo (41 y.o. male)  Date: 3/28/2018  Primary Diagnosis: Osteoarthritis of left knee, unspecified osteoarthritis type [M17.12]  Procedure(s) (LRB):  LEFT TOTAL KNEE ARTHROPLASTY (Left) 1 Day Post-Op   Precautions: falls, WBAT       ASSESSMENT AND RECOMMENDATIONS:  OT eval completed POD #1 s/p TKA. Pt educated in [employer] for increased ADL independence and safety. Following instruction, pt able to demo all ADL and related transfer with MOD (I)-supervision level. Wife to provide assist/supervision at home PRN. Skilled occupational therapy is not indicated at this time. Discharge Recommendations: None  Further Equipment Recommendations for Discharge: N/A      Barriers to Learning/Limitations: None  Compensate with: visual, verbal, tactile, kinesthetic cues/model     COMPLEXITY     Eval Complexity: History: LOW Complexity : Brief history review ; Examination: LOW Complexity : 1-3 performance deficits relating to physical, cognitive , or psychosocial skils that result in activity limitations and / or participation restrictions ; Decision Making:LOW Complexity : No comorbidities that affect functional and no verbal or physical assistance needed to complete eval tasks  Assessment: low Complexity        G-CODES:     Self Care  Current  CI= 1-19%   Goal  CI= 1-19%   D/C  CI= 1-19%. The severity rating is based on the Level of Assistance required for Functional Mobility and ADLs. SUBJECTIVE:   Patient stated my wife will be with me.     OBJECTIVE DATA SUMMARY:     Past Medical History:   Diagnosis Date    Arthritis     Bleeding     Blood clot in the legs     Chronic pain     knee and shoulder    Esophageal reflux     GERD (gastroesophageal reflux disease)     Headache(784.0)     migraine    High cholesterol     Hypertension  Lower back pain 11/6/2010    Other chest pain     Personal history of venous thrombosis and embolism     Pure hypercholesterolemia     Right buttock pain 11/6/2010    Right foot pain     Rotator cuff tear     left-since 2010, worsened tear Young.    Spinal stenosis     Tendonitis, tibialis     anterior    [facility]embolus ([facility]     3 after sx last one 2000     Past Surgical History:   Procedure Laterality Date    FOOT/TOES SURGERY PROC UNLISTED      HX BACK SURGERY      HX ORTHOPAEDIC  06-25-12    Right foot with excision of bursa and adipose tissue from fifth metatarsal base by Dr. Cooper Luis      lower back (1992 and 2000)    HX OTHER SURGICAL      left foot (2008)    LAMINOTOMY      NERVE BLOCK       Prior Level of Function/Home Situation: independent  Home Situation  Home Environment: Private residence  # Steps to Enter: 3  One/Two Story Residence: One story  Living Alone: No  Support Systems: Family member(s)  Patient Expects to be Discharged to[de-identified] Private residence  Current DME Used/Available at Home: Shower chair  Tub or Shower Type: Tub/Shower combination  []     Right hand dominant   []     Left hand dominant  Cognitive/Behavioral Status:  Neurologic State: Alert  Orientation Level: Oriented X4          Skin: no noted concern    Edema: no noted concern    Vision/Perceptual:    Tracking:  (no noted concern)                                Coordination:  Coordination: Within functional limits (BUE)            Balance:   good, dynamic in ADL task with fww    Strength:    Strength: Within functional limits (BUE)                Tone & Sensation:    Tone: Normal (BUE)  Sensation: Intact (BUE)                      Range of Motion:    AROM: Within functional limits (BUE)                         Functional Mobility and Transfers for ADLs:  Bed Mobility:              Transfers:                    Toilet Transfer : Modified independent                ADL Assessment:  Feeding: Independent    Oral Facial Hygiene/Grooming: Independent    Bathing: Supervision    Upper Body Dressing: Independent    Lower Body Dressing: Modified independent    Toileting: Modified independent                ADL Intervention:        LB dressing MOD (I)                               Pain:  Pt reports 0/10 pain or discomfort prior to treatment.    Pt reports 0/10 pain or discomfort post treatment. Activity Tolerance:   good    Please refer to the flowsheet for vital signs taken during this treatment. After treatment:   [x]  Patient left in no apparent distress sitting up in chair  []  Patient left in no apparent distress in bed  [x]  Call bell left within reach  [x]  Nursing notified  [x]  Caregiver present  []  Bed alarm activated    COMMUNICATION/EDUCATION:   Communication/Collaboration:  [x]      Home safety education was provided and the patient/caregiver indicated understanding. [x]      Patient/family have participated as able and agree with findings and recommendations. []      Patient is unable to participate in plan of care at this time.     Cecy Robertsal, OT  Time Calculation: 32 mins

## 2018-03-28 NOTE — PROGRESS NOTES
Problem: Mobility Impaired (Adult and Pediatric)  Goal: *Acute Goals and Plan of Care (Insert Text)  Physical Therapy Goals  Initiated 3/27/2018 and to be accomplished within 7 day(s)  1. Patient will move from supine to sit and sit to supine , scoot up and down and roll side to side in bed with supervision/set-up. 2.  Patient will transfer from bed to chair and chair to bed with supervision/set-up using the least restrictive device. 3.  Patient will perform sit to stand with supervision/set-up. 4.  Patient will ambulate with supervision/set-up for >150 feet with the least restrictive device. 5.  Patient will ascend/descend 4 stairs with 1 handrail(s) with supervision/set-up. Outcome: Progressing Towards Goal  physical Therapy TREATMENT    Patient: Kali Nickerson (52 y.o. male)  Date: 3/28/2018  Diagnosis: Osteoarthritis of left knee, unspecified osteoarthritis type [M17.12] <principal problem not specified>  Procedure(s) (LRB):  LEFT TOTAL KNEE ARTHROPLASTY (Left) 1 Day Post-Op  Precautions:     Chart, physical therapy assessment, plan of care and goals were reviewed. OBJECTIVE/ ASSESSMENT:  Pt found sitting in b/s chair willing to work with PT. Pt ambulated into hallway this tx and is able to increase distance. Pt presents with reciprocal gait pattern, able to ambulate to stairway and negotiate 11 stairs. Pt reports he has no handrails for his stairs at home and is able negotiate stairs with one handrails and HHA from this LPTA. Pt has a cane at home and was instructed on cane use with HHA from his wife. Pt does not place heavy weight into this LPTAs hand. Pt returned to EOB where he performed seated therex. HEP was reviewed at length, focusing heavily on LAQ and knee flexion. Also reviewed safety at home post DC as well continued activity expectations. Pt was left sitting at EOB with needs in reach. Pt progressing well toward goals.     Education: therex, gait, ice, stairs, HEP, review of HHPT and out-patient PT. Progression toward goals:  [x]      Improving appropriately and progressing toward goals  []      Improving slowly and progressing toward goals  []      Not making progress toward goals and plan of care will be adjusted     PLAN:  Patient continues to benefit from skilled intervention to address the above impairments. Continue treatment per established plan of care. Discharge Recommendations:  Home Health  Further Equipment Recommendations for Discharge:  rolling walker     SUBJECTIVE:   Patient stated I'm going to be out of work for a little while.     OBJECTIVE DATA SUMMARY:   Critical Behavior:  Neurologic State: Alert  Orientation Level: Oriented X4  Functional Mobility Training:  Transfers:  Sit to Stand: Supervision  Stand to Sit: Supervision  Balance:  Sitting: Intact  Standing: Impaired; With support  Standing - Static: Good  Standing - Dynamic : Fair (+)  Ambulation/Gait Training:  Distance (ft): 190 Feet (ft)  Assistive Device: Walker, rolling  Ambulation - Level of Assistance: Supervision  Gait Abnormalities: Decreased step clearance  Speed/Veronica: Slow  Therapeutic Exercises:   LAQ, ankle pumps x 10 bilaterally. Knee flexion on left with contralateral LE assist x 3 with 10 second hold. Pain:  Pre tx pain: 3  Post tx pain: 5  Intervention: rest.  Activity Tolerance:   Fair  Please refer to the flowsheet for vital signs taken during this treatment. After treatment:   [x] Patient left in no apparent distress sitting EOB  [] Patient left in no apparent distress in bed  [x] Call bell left within reach  [] Nursing notified  [] Caregiver present  [] Bed alarm activated  [] SCDs applied  [] Ice applied      Reji Worley PTA   Time Calculation: 41 mins    Mobility [id number] Current  CI= 1-19%. The severity rating is based on the Level of Assistance required for Functional Mobility and ADLs. Mobility   Goal  CI= 1-19%.   The severity rating is based on the Level of Assistance required for Functional Mobility and ADLs.

## 2018-03-28 NOTE — PROGRESS NOTES
OT eval completed, pt has met OT goals for safe d/c home with family. Full summary to follow.      Ino Renee OTR\L

## 2018-03-28 NOTE — ROUTINE PROCESS
2201 Patient ambulated in the hallway x2 tolerated very well.; Dressing to left knee dry and clean,hemovac drain draining sanguinous liquid, CMS intact pedal pulses palpable. Patient voided 150cc of yellow urine. No acute distress noted. ; - - -

## 2018-03-28 NOTE — PROGRESS NOTES
Mobility Intervention:       [] Pt dangled at edge of bed    [] Pt assisted OOB to bedside commode    [] Pt assisted OOB to chair    [] Pt ambulated to bathroom    [x] Patient was ambulated in room/hallway    Assistive Device Utilized:       [x] Rolling walker   [] Crutches   [] Straight Cane   [] Knee immobilizer   [] IV pole    After Mobilization:     [x] Patient left in no apparent distress sitting up in chair  [] Patient left in no apparent distress in bed  [] Call bell left within reach  [] SCDs on & machine turned on  [] Ice applied  [] RN notified  [] Caregiver present  [] Bed alarm activated    Reason patient not mobilized:      [] Patient refused   [] Nausea/vomiting   [] Low blood pressure   [] Drowsy/lethargic    Pain Rating:     [] 0  [] 1  Assistive Device:        [] 2  [] 3  [] 4  [] 5  [] 6  Assistive Device:        [] 7  [] 8  [] 9  [] 10    Comments:

## 2018-03-28 NOTE — OP NOTES
[address] [place] [address]   OPERATIVE REPORT    Carin Lee  MR#: [id number]  : 1955  ACCOUNT #: [de-identified]   DATE OF SERVICE: 2018    PREOPERATIVE DIAGNOSIS:  End-stage arthritis, left knee. POSTOPERATIVE DIAGNOSIS:  End-stage arthritis, left knee. PROCEDURE PERFORMED: Left total knee replacement using the Triathlon system, size 5 left posterior stabilized femoral component, size 5 tibia, size 5 11 PS insert, and a 35 asymmetric patella. SURGEON: Becky Billings MD     COMPLICATIONS: No complication. SPECIMENS REMOVED:  None. ESTIMATED BLOOD LOSS:  50 mL. ASSISTANT:  Krystal Matamoros, first assistant; Treasure Fan, second assistant     ANESTHESIA:  Dr. Ed Vizcarra, preoperative femoral nerve block. IMPLANTS:  Are as above-mentioned. DESCRIPTION OF PROCEDURE: After anesthetic was successfully induced, confirmed antibiotics were given, standard prep and drape and a timeout was performed. Midline incision  usual fashion. Femoral canal aspirated, lavaged, and reaspirated prior to instrumentation, cut for 5 degrees to the appropriate side. The crab claw was utilized to prevent undercutting. All cuts were checked for trueness and squareness, and all soft tissue structures protected during the sawing process. A modified gap balancing technique would be performed. Extra 2 was taken off the distal femur to help balance the knee, and after making preliminary femoral cuts with retention of the tibia and protecting neurovascular bundle, external alignment guide with appropriate landmarks and resected enough to get a decent cleanup cut. We then removed posterior osteophytes while protecting the neurovascular bundle and used the Aquamantys and Exparel cocktail. We then gap balanced the knee between medial, lateral, flexion, extension, thus confirming correct femoral rotation.   We then did our femoral finishing followed by placement of the trial components to set our tibial rotation, marked later, repunched, resurfaced the patella, restoring patellar thickness anatomically, using rongeur to smooth out the edges. With all the trial components in place, checked the overall alignment, range of motion, soft tissue balance, patellar tracking, stability and alignment, all of which we were delighted with. We fashioned a bone plug for the femoral canal, further controlled hemostasis, cemented in the knee, removing all extraneous cement and holding the knee in full extension. The cement was fully cured. Further cement removal.  Further pulse lavage. Further trialing. I was happiest with the 11 locked in place, again reduced the knee, placed a deep drain. Routine closure. Fully flexed the knee prior to closure of the skin. At the end of the case, instrument, sponge, and needle count was correct. No complications. The patient tolerated the procedure well.       Stefanie Habermann, MD AM / Leland Will  D: 03/28/2018 11:10     T: 03/28/2018 11:54  JOB #: [id number]

## 2018-03-28 NOTE — HOME CARE
[facility] received referral for SN/PT - [place] Knee Protocol* - discharge orders noted - address and contact numbers verified - patient lives with his spouse Nimo Flannery (caregiver). Per , Ana Connors, a front wheeled walker has been ordered from Darrius Apodaca with [employer]. Referral called to central intake for completion and visit scheduling - OLIVE Huddleston LPN

## 2018-03-28 NOTE — DISCHARGE SUMMARY
3/27/2018  7:55 AM    3/28/2018, 8:39 AM    Primary Dx:left Orthopedic / Rheumatologic: Total Knee Replacement  Secondary Dx: Etiological Diagnoses: none    HPI:  Pt has end stage OA and had failed conservative treatment. Due to the current findings and affected activity of daily living surgical intervention is indicated.   The alternatives, risks, complications as well as expected outcome were discussed, the patient understands and wishes to proceed with surgery    Past Medical History:   Diagnosis Date    Arthritis     Bleeding     Blood clot in the legs     Chronic pain     knee and shoulder    Esophageal reflux     GERD (gastroesophageal reflux disease)     Headache(784.0)     migraine    High cholesterol     Hypertension     Lower back pain 11/6/2010    Other chest pain     Personal history of venous thrombosis and embolism     Pure hypercholesterolemia     Right buttock pain 11/6/2010    Right foot pain     Rotator cuff tear     left-since 2010, worsened tear Jan.    Spinal stenosis     Tendonitis, tibialis     anterior    Thromboembolus ([facility] [address]     3 after sx last one 2000         Current Facility-Administered Medications:     warfarin (COUMADIN) tablet 8 mg, 8 mg, Oral, ONCE, [employer]    enoxaparin (LOVENOX) injection 30 mg, 30 mg, SubCUTAneous, ONCE, [employer] [facility] PARICHARD    WARFARIN INFORMATION NOTE (COUMADIN), , Other, Q24H, Michael Rayo MD    azelastine (ASTELIN) 137mcg/spray nasal spray, 1 Spray, Both Nostrils, BID, Michael Rayo MD, 1 [place] at 03/28/18 0837    labetalol (NORMODYNE) tablet 100 mg, 100 mg, Oral, BID, Michael Rayo MD, 100 mg at 03/28/18 0803    pantoprazole (PROTONIX) tablet 40 mg, 40 mg, Oral, ACB, Michael Rayo MD, 40 mg at 03/28/18 0803    levoFLOXacin (LEVAQUIN) tablet 500 mg, 500 mg, Oral, Q24H, Michael Rayo MD, 500 mg at 03/27/18 2030    lisinopril-hydroCHLOROthiazide (PRINZIDE, ZESTORETIC) 20-12.5 mg per tablet 1 Tab, 1 Tab, Oral, Karen Stiles MD, 1 Tab at 03/28/18 8712    montelukast (SINGULAIR) tablet 10 mg, 10 mg, Oral, QHS, Shashank Leon MD, 10 mg at 03/27/18 2305    raNITIdine (ZANTAC) tablet 150 mg, 150 mg, Oral, QHS, Shashank Leon MD, 150 mg at 03/27/18 2305    0.9% sodium chloride infusion, 100 mL/hr, IntraVENous, CONTINUOUS, Shashank Leon MD, Last Rate: 100 mL/hr at 03/28/18 0421, 100 mL/hr at 03/28/18 0421    sodium chloride (NS) flush 5-10 mL, 5-10 mL, IntraVENous, Q8H, Shashank Leon MD, 10 mL at 03/28/18 0810    sodium chloride (NS) flush 5-10 mL, 5-10 mL, IntraVENous, PRN, Shashank Leon MD    naloxone [facility]) injection 0.4 mg, 0.4 mg, IntraVENous, PRN, Shashank Leon MD    ferrous sulfate tablet 325 mg, 1 Tab, Oral, BID WITH MEALS, Shashank Leon MD, 325 mg at 03/28/18 0803    diphenhydrAMINE (BENADRYL) injection 12.5 mg, 12.5 mg, IntraVENous, Q6H PRN, Shashank Leon MD    zolpidem (AMBIEN) tablet 5 mg, 5 mg, Oral, QHS PRN, Shashank Leon MD    acetaminophen (TYLENOL) tablet 1,000 mg, 1,000 mg, Oral, QID, Shashank Leon MD, 1,000 mg at 03/28/18 0802    oxyCODONE IR (OXY-IR) immediate release tablet 15 mg, 15 mg, Oral, Q3H PRN, Shashank Leon MD, 15 mg at 03/28/18 0837    clindamycin phosphate (CLEOCIN) 600 mg in 0.9% sodium chloride (MBP/ADV) 100 mL ADV, 600 mg, IntraVENous, Q8H, Shashank Leon MD, Last Rate: 200 mL/hr at 03/28/18 0418, 600 mg at 03/28/18 0418    ondansetron (ZOFRAN) injection 4 mg, 4 mg, IntraVENous, Q4H PRN, Shashank Leon MD    docusate sodium (COLACE) capsule 100 mg, 100 mg, Oral, BID, Shashank Leon MD, 100 mg at 03/28/18 0803    celecoxib (CELEBREX) capsule 200 mg, 200 mg, Oral, BID, Shashank Leon MD, 200 mg at 03/28/18 0803    pregabalin (LYRICA) capsule 50 mg, 50 mg, Oral, BID, Shashank Leon MD, 50 mg at 03/28/18 0802      Augmentin [amoxicillin-pot clavulanate];  Hibiclens [chlorhexidine gluconate]; and Penicillins    Physical Exam:  General A&O x3 NAD, well developed, well nourished, normal affect  Heart: S1-S2, RRR  Lungs: CTA Bilat  Abd: soft NT, ND  Ext: n/v intact    Hospital Course:    Pt. Had leftOrthopedic / Rheumatologic: Total Knee Replacement    Post -op Course: The patient tolerated the procedure well. They were followed by internal medicine for help with medical management. Pt. Was place on Abx pre and post-op for prophylaxis against infection as well as coumadin pre and post-op for prophylaxis against DVT. Vitals signs remained stable, remained af. The wound wasclean, dry, no drainage. Pain was well controlled. Pt. Had negative calf tenderness or swelling, no evidence for DVT. Patient had PT/OT consult for evaluation and treatment. CBC  Lab Results   Component Value Date/Time    WBC 9.6 03/28/2018 04:15 AM    RBC 3.73 (L) 03/28/2018 04:15 AM    HCT 35.6 (L) 03/28/2018 04:15 AM    MCV 95.4 03/28/2018 04:15 AM    MCH 31.4 03/28/2018 04:15 AM    MCHC 32.9 03/28/2018 04:15 AM    RDW 13.0 03/28/2018 04:15 AM     Coagulation  Lab Results   Component Value Date    INR 1.2 03/28/2018    APTT 32.2 03/16/2018      Basic Metabolic Profile  Lab Results   Component Value Date     03/16/2018    CO2 30 03/16/2018    BUN 16 03/16/2018       Discharge Plan:  The patient will be d/c'd to home, total knee protocol, WBAT. he will have [facility] PT and nursing. Total joint protocol. Pt safe for homebound transfer, sp Total joint replacement. A walker, bedside commode, and shower chair will be utilized for ADL's. Follow up with Dr. Sivan Phan in 10-12 days. Call with any questions or concerns.

## 2018-03-28 NOTE — ROUTINE PROCESS
Bedside and Verbal shift change report given to Adelina Medina (oncoming nurse) by Delfina Vyas RN (offgoing nurse). Report included the following information SBAR, Kardex, MAR and Recent Results.     SITUATION:    Code Status: No Order   Reason for Admission: Osteoarthritis of left knee, unspecified osteoarthritis type Anjelica  1560 day: 1   Problem List:       Hospital Problems  Date Reviewed: 3/22/2018          Codes Class Noted POA    Arthritis of knee ICD-10-CM: M17.10  ICD-9-CM: 716.96  3/27/2018 Unknown              BACKGROUND:    Past Medical History:   Past Medical History:   Diagnosis Date    Arthritis     Bleeding     Blood clot in the legs     Chronic pain     knee and shoulder    Esophageal reflux     GERD (gastroesophageal reflux disease)     Headache(784.0)     migraine    High cholesterol     Hypertension     Lower back pain 2010    Other chest pain     Personal history of venous thrombosis and embolism     Pure hypercholesterolemia     Right buttock pain 2010    Right foot pain     Rotator cuff tear     left-since , worsened tear .    Spinal stenosis     Tendonitis, tibialis     anterior    Thromboembolus ([facility] [address]     3 after sx last one          Patient taking anticoagulants no     ASSESSMENT:    Changes in Assessment Throughout Shift: no     Patient has Central Line: no Reasons if yes: no   Patient has Diallo Cath: no Reasons if yes: no      Last Vitals:     Vitals:    18 1512 18 2200 18 0015 18 0324   BP: 138/82 140/84 121/70 125/69   Pulse: 75 72 81 82   Resp: 15 18 19 16   Temp: 97.8 °F (36.6 °C) 97.3 °F (36.3 °C) 97.4 °F (36.3 °C) 97.4 °F (36.3 °C)   SpO2: 96% 97% 97% 95%   Weight:       Height:            IV and DRAINS (will only show if present)   Peripheral IV 18 Left Hand-Site Assessment: Clean, dry, & intact  Abdiaziz-Larios Drain 18 Left Knee-Site Assessment: Clean, dry, & intact     WOUND (if present)   Wound Type:  none   Dressing present Dressing Present : Yes   Wound Concerns/Notes:  none     PAIN    Pain Assessment    Pain Intensity 1: 7 (03/28/18 0410)    Pain Location 1: Knee    Pain Intervention(s) 1: Medication (see MAR)       o Interventions for Pain:  none  o Intervention effective: no  o Time of last intervention: 0410   o Reassessment Completed: no      Last 3 Weights:  Last 3 Recorded Weights in this Encounter    03/16/18 0744 03/27/18 0928   Weight: 99.8 kg (220 lb) 100.5 kg (221 lb 9.6 oz)     Weight change:      INTAKE/OUPUT    Current Shift: 03/27 1901 - 03/28 0700  In: 1500 [P.O.:700; I.V.:800]  Out: 490 [Urine:300; Drains:190]    Last three shifts: 03/26 0701 - 03/27 1900  In: 2480 [P.O.:480; I.V.:2000]  Out: 650 [Urine:500; Drains:150]     LAB RESULTS     Recent Labs      03/28/18 0415   WBC  9.6   HGB  11.7*   HCT  35.6*   PLT  167        Recent Labs      03/28/18   0415  03/27/18   0856   INR  1.2  1.0       RECOMMENDATIONS AND DISCHARGE PLANNING     1. Pending tests/procedures/ Plan of Care or Other Needs: Pt/OT     2. Discharge plan for patient and Needs/Barriers: Home health    3. Estimated Discharge Date: #/28/17 Posted on Whiteboard in Patients Room: no      4. The patient's care plan was reviewed with the oncoming nurse. \"HEALS\" SAFETY CHECK      Fall Risk    Total Score: 1    Safety Measures: Safety Measures: Bed/Chair-Wheels locked, Bed in low position, Call light within reach, Caregiver at bedside    A safety check occurred in the patient's room between off going nurse and oncoming nurse listed above.     The safety check included the below items  Area Items   H  High Alert Medications - Verify all high alert medication drips (heparin, PCA, etc.)   E  Equipment - Suction is set up for ALL patients (with yanker)  - Red plugs utilized for all equipment (IV pumps, etc.)  - WOWs wiped down at end of shift.  - Room stocked with oxygen, suction, and other unit-specific supplies   A  Alarms - Bed alarm is set for fall risk patients  - Ensure chair alarm is in place and activated if patient is up in a chair   L  Lines - Check IV for any infiltration  - Diallo bag is empty if patient has a Diallo   - Tubing and IV bags are labeled   S  Safety   - Room is clean, patient is clean, and equipment is clean. - Hallways are clear from equipment besides carts. - Fall bracelet on for fall risk patients  - Ensure room is clear and free of clutter  - Suction is set up for ALL patients (with yanker)  - Hallways are clear from equipment besides carts.    - Isolation precautions followed, supplies available outside room, sign posted     Aria Zambrano RN

## 2018-03-28 NOTE — PROGRESS NOTES
Care Management Interventions  PCP Verified by CM: Yes  Palliative Care Criteria Met (RRAT>21 & CHF Dx)?: No  Mode of Transport at Discharge: Other (see comment) (Wife POV)  Transition of Care Consult (CM Consult): [address]: Yes  Discharge Durable Medical Equipment: Yes (FWW, order in and Orlene Bostic notified. )  Physical Therapy Consult: Yes  Occupational Therapy Consult: Yes  Speech Therapy Consult: No  Current Support Network: Lives with Spouse, Own Home (4 step entry living area on one floor. Spouse will help with recovery needs. )  Confirm Follow Up Transport: Family  Plan discussed with Pt/Family/Caregiver: Yes  Freedom of Choice Offered: Yes  Discharge Location  Discharge Placement: Home with home health     Admitted for left TKA Dr. Nixon Urbano protocol. [facility] is aware he chose them for his home care. Referral in ordered walker.

## 2018-03-28 NOTE — PROGRESS NOTES
0700 Report received from night nurse. Patient resting quietly at this time. No acute distress noted. Will monitor. 1600 Patient up in harrell ambulating with physical therapy. Roxicodone given for pain and effective. Cold therapy on though out the day. Up in chair for meals. Discharge instructions given to patient. Prescriptions picked up from pharmacy from wife. Daily dose of coumadin given. IV site removed cannula intact. Wife here to drive patient home. Assisted to car via W/C. Ice machine given to patient to take home.

## 2018-03-28 NOTE — PROGRESS NOTES
Ortho    Pt. Seen and evaluated. Doing well, pain well controlled, progressed well with PT  Denies cp, sob, abd pain    Blood pressure 133/75, pulse 83, temperature 96.9 °F (36.1 °C), resp. rate 16, height 5' 10\" (1.778 m), weight 221 lb 9.6 oz (100.5 kg), SpO2 95 %.    leftKnee woundclean, dry, no drainage  Sensory intact to LT  Motor intact  nv intact  Neg calf tenderness    Labs:  CBC  @  CBC:   Lab Results   Component Value Date/Time    WBC 9.6 03/28/2018 04:15 AM    RBC 3.73 (L) 03/28/2018 04:15 AM    HGB 11.7 (L) 03/28/2018 04:15 AM    HCT 35.6 (L) 03/28/2018 04:15 AM    PLATELET 371 38/54/3063 04:15 AM     BMP:   Lab Results   Component Value Date/Time    Glucose 126 (H) 03/16/2018 08:40 AM    Sodium 142 03/16/2018 08:40 AM    Potassium 4.0 03/16/2018 08:40 AM    Chloride 104 03/16/2018 08:40 AM    CO2 30 03/16/2018 08:40 AM    BUN 16 03/16/2018 08:40 AM    Creatinine 0.98 03/16/2018 08:40 AM    Calcium 9.1 03/16/2018 08:40 AM   @  Coagulation  Lab Results   Component Value Date    INR 1.2 03/28/2018    APTT 32.2 03/16/2018      Basic Metabolic Profile  Lab Results   Component Value Date     03/16/2018    CO2 30 03/16/2018    BUN 16 03/16/2018       Assesment: leftOrthopedic / Rheumatologic: Total Knee Replacement  Past Medical History:   Diagnosis Date    Arthritis     Bleeding     Blood clot in the legs     Chronic pain     knee and shoulder    Esophageal reflux     GERD (gastroesophageal reflux disease)     Headache(784.0)     migraine    High cholesterol     Hypertension     Lower back pain 11/6/2010    Other chest pain     Personal history of venous thrombosis and embolism     Pure hypercholesterolemia     Right buttock pain 11/6/2010    Right foot pain     Rotator cuff tear     left-since 2010, worsened tear Jan.    Spinal stenosis     Tendonitis, tibialis     anterior    Thromboembolus ([address]     3 after sx last one 2000     ASA: 2    Status post joint replacement pt with risk of bleeding, blood clots, and infection.     Plan: coumadin, lovenox, PT, home today

## 2018-03-28 NOTE — ROUTINE PROCESS
Mobility Intervention:       [] Pt dangled at edge of bed    [] Pt assisted OOB to bedside commode    [] Pt assisted OOB to chair    [] Pt ambulated to bathroom    [x] Patient was ambulated in room/hallway    Assistive Device Utilized:       [x] Rolling walker   [] Crutches   [] Straight Cane   [] Knee immobilizer   [] IV pole    After Mobilization:     [] Patient left in no apparent distress sitting up in chair  [x] Patient left in no apparent distress in bed  [x] Call bell left within reach  [x] SCDs on & machine turned on  [x] Ice applied  [] RN notified  [] Caregiver present  [] Bed alarm activated    Reason patient not mobilized:      [] Patient refused   [] Nausea/vomiting   [] Low blood pressure   [] Drowsy/lethargic    Pain Rating:     [] 0  [] 1  Assistive Device:        [] 2  [] 3  [] 4  [] 5  [] 6  Assistive Device:        [] 7  [] 8  [] 9  [] 10    Comments:

## 2018-03-28 NOTE — DISCHARGE INSTRUCTIONS
Knee Arthroscopy: What to Expect at Home  Your Recovery    Arthroscopy is a way to find problems and do surgery inside a joint without making a large cut (incision). Your doctor put a lighted tube with a tiny camera-called an arthroscope, or scope-and surgical tools through small incisions in your knee. You will feel tired for several days. Your knee will be swollen, and you may notice that your skin is a different color near the cuts (incisions). The swelling is normal and will start to go away in a few days. Keeping your leg higher than your heart will help with swelling and pain. You will probably need about 6 weeks to recover. If your doctor repaired damaged tissue, recovery will take longer. You may have to limit your activity until your knee strength and movement return to normal. You may also be in a physical rehabilitation (rehab) program.  You may be able to return to a desk job or your normal routine in a few days. But if you do physical labor, it may be as long as 2 months before you can return to work. This care sheet gives you a general idea about how long it will take for you to recover. But each person recovers at a different pace. Follow the steps below to get better as quickly as possible. How can you care for yourself at home? Activity  ? · Rest when you feel tired. Getting enough sleep will help you recover. Use pillows to raise your ankle and leg above the level of your heart. ? · Try to walk each day, after your doctor has said you can. Start by walking a little more than you did the day before. Bit by bit, increase the amount you walk. Walking boosts blood flow and helps prevent pneumonia and constipation. ? · You may have a brace or crutches or both. ? · Your doctor will tell you how often and how much you can move your leg and knee. ? · If you have a desk job, you may be able to return to work a few days after the surgery.  If you lift things or stand or walk a lot at work, it may be as long as 2 months before you can return. ? · You can take a shower 48 to 72 hours after surgery and clean the incisions with regular soap and water. Do not take a bath or soak your knee until your doctor says it is okay. ? · Ask your doctor when you can drive again. ? · If you had a repair of torn tissue, follow your doctor's instructions for lifting things or moving your knee. Diet  ? · You can eat your normal diet. If your stomach is upset, try bland, low-fat foods like plain rice, broiled chicken, toast, and yogurt. ? · Drink plenty of fluids, unless your doctor tells you not to. ? · You may notice that your bowel movements are not regular right after your surgery. This is common. Try to avoid constipation and straining with bowel movements. You may want to take a fiber supplement every day. If you have not had a bowel movement after a couple of days, ask your doctor about taking a mild laxative. Medicines  ? · Your doctor will tell you if and when you can restart your medicines. He or she will also give you instructions about taking any new medicines. ? · If you take blood thinners, such as warfarin (Coumadin), clopidogrel (Plavix), or aspirin, be sure to talk to your doctor. He or she will tell you if and when to start taking those medicines again. Make sure that you understand exactly what your doctor wants you to do. ? · Be safe with medicines. Take pain medicines exactly as directed. ¨ If the doctor gave you a prescription medicine for pain, take it as prescribed. ¨ If you are not taking a prescription pain medicine, ask your doctor if you can take an over-the-counter medicine. ? · If you think your pain medicine is making you sick to your stomach:  ¨ Take your medicine after meals (unless your doctor has told you not to). ¨ Ask your doctor for a different pain medicine. ? · If your doctor prescribed antibiotics, take them as directed.  Do not stop taking them just because you feel better. You need to take the full course of antibiotics. Incision care  ? · If you have a dressing over your cuts (incisions), keep it clean and dry. You may remove it 48 to 72 hours after the surgery. ? · If your incisions are open to the air, keep the area clean and dry. ? · If you have strips of tape on the incisions, leave the tape on for a week or until it falls off. Exercise  ? · Move your toes and ankle as much as your bandages will allow. ? · Bend and straighten your knee slowly several times during the day. ? · Depending on why you had the surgery, you may have to do ankle and leg exercises. Your doctor or physical therapist will give you exercises as part of a rehabilitation program.   ? · Stop any activity that causes sharp pain. Talk to your doctor or physical therapist about what sports or other exercise you can do. Ice and elevation  ? · To reduce swelling and pain, put ice or a cold pack on your knee for 10 to 20 minutes at a time. Do this every 1 to 2 hours. Put a thin cloth between the ice and your skin. Follow-up care is a key part of your treatment and safety. Be sure to make and go to all appointments, and call your doctor if you are having problems. It's also a good idea to know your test results and keep a list of the medicines you take. When should you call for help? Call 911 anytime you think you may need emergency care. For example, call if:  ? · You passed out (lost consciousness). ? · You have severe trouble breathing. ? · You have sudden chest pain and shortness of breath, or you cough up blood. ?Call your doctor now or seek immediate medical care if:  ? · Your foot or toes are numb or tingling. ? · Your foot is cool or pale, or it changes color. ? · You have signs of a blood clot, such as:  ¨ Pain in your calf, back of the knee, thigh, or groin. ¨ Redness and swelling in your leg or groin.    ? · You are sick to your stomach or cannot keep fluids down.   ? · You have pain that does not get better after you take pain medicine. ? · You have loose stitches, or your incision comes open. ? · Bright red blood has soaked through the bandage over your incision. ? · You have signs of infection, such as:  ¨ Increased pain, swelling, warmth, or redness. ¨ Red streaks leading from the incisions. ¨ Pus draining from the incisions. ¨ A fever. ? Watch closely for any changes in your health, and be sure to contact your doctor if:  ? · You do not have a bowel movement after taking a laxative. Where can you learn more? Go to http://rivka-jarrell.info/. Enter R563 in the search box to learn more about \"Knee Arthroscopy: What to Expect at Home. \"  Current as of: March 21, 2017  Content Version: 11.4  © 2596-6738 [employer]. Care instructions adapted under license by [employer] (which disclaims liability or warranty for this information). If you have questions about a medical condition or this instruction, always ask your healthcare professional. [place] [address] any warranty or liability for your use of this information. DISCHARGE SUMMARY from Nurse    The following personal items collected during your admission are returned to you:   Dental Appliance: Dental Appliances: None  Vision: Visual Aid: None  Hearing Aid:    Jewelry: Jewelry: None  Clothing: Clothing: Pants, Undergarments, Jacket/Coat, Footwear, Shirt  Other Valuables: Other Valuables: None  Valuables sent to safe:        PATIENT INSTRUCTIONS:    After general anesthesia or intravenous sedation, for 24 hours or while taking prescription Narcotics:  · Limit your activities  · Do not drive and operate hazardous machinery  · Do not make important personal or business decisions  · Do  not drink alcoholic beverages  · If you have not urinated within 8 hours after discharge, please contact your surgeon on call.     Report the following to your surgeon:  · Excessive pain, swelling, redness or odor of or around the surgical area  · Temperature over 100.5  · Nausea and vomiting lasting longer than 4 hours or if unable to take medications  · Any signs of decreased circulation or nerve impairment to extremity: change in color, persistent  numbness, tingling, coldness or increase pain  · Any questions      Follow-up Appointments   Procedures    FOLLOW UP VISIT Appointment in: Two Weeks     Standing Status:   Standing     Number of Occurrences:   1     Order Specific Question:   Appointment in     Answer: Two Weeks       What to do at Home:        *  Please give a list of your current medications to your Primary Care Provider. *  Please update this list whenever your medications are discontinued, doses are      changed, or new medications (including over-the-counter products) are added. *  Please carry medication information at all times in case of emergency situations. These are general instructions for a healthy lifestyle:    No smoking/ No tobacco products/ Avoid exposure to second hand smoke    Surgeon General's Warning:  Quitting smoking now greatly reduces serious risk to your health. Obesity, smoking, and sedentary lifestyle greatly increases your risk for illness    A healthy diet, regular physical exercise & weight monitoring are important for maintaining a healthy lifestyle    You may be retaining fluid if you have a history of heart failure or if you experience any of the following symptoms:  Weight gain of 3 pounds or more overnight or 5 pounds in a week, increased swelling in our hands or feet or shortness of breath while lying flat in bed. Please call your doctor as soon as you notice any of these symptoms; do not wait until your next office visit.     Recognize signs and symptoms of STROKE:    F-face looks uneven    A-arms unable to move or move unevenly    S-speech slurred or non-existent    T-time-call 911 as soon as signs and symptoms begin-DO NOT go       Back to bed or wait to see if you get better-TIME IS BRAIN. The discharge information has been reviewed with the patient. The patient verbalized understanding. MyChart Activation    Thank you for requesting access to [facility]. Please follow the instructions below to securely access and download your online medical record. [facility] allows you to send messages to your doctor, view your test results, renew your prescriptions, schedule appointments, and more. How Do I Sign Up? 1. In your internet browser, go to [email or web address]. 2. Click on the First Time User? Click Here link in the Sign In box. You will see the New Member Sign Up page. 3. Enter your [facility] Access Code exactly as it appears below. You will not need to use this code after youve completed the sign-up process. If you do not sign up before the expiration date, you must request a new code. [facility] Access Code: [email or web address]  Expires: 2012  9:42 AM (This is the date your [facility] access code will )    4. Enter the last four digits of your Social Security Number (xxxx) and Date of Birth (mm/dd/yyyy) as indicated and click Submit. You will be taken to the next sign-up page. 5. Create a [facility] ID. This will be your [facility] login ID and cannot be changed, so think of one that is secure and easy to remember. 6. Create a [facility] password. You can change your password at any time. 7. Enter your Password Reset Question and Answer. This can be used at a later time if you forget your password. 8. Enter your e-mail address. You will receive e-mail notification when new information is available in [address] 9. Click Sign Up. You can now view and download portions of your medical record. 10. Click the Download Summary menu link to download a portable copy of your medical information.     Additional Information    If you have questions, please visit the Frequently Asked Questions section of the [email or web address] website at [email or web address] Remember, MyChart is NOT to be used for urgent needs. For medical emergencies, dial 911. Armband removed and shredded    Discharge Instructions for Total Knee Replacement Patients    · The dressing on your knee will be changed by the Home Health professional at the appropriate time. Keep your incision clean and dry. Do not apply any ointments to the incision. · You may shower as long as you keep you incision dry. When showering, leave your dressing on. The dressing is waterproof as long as the edges are sealed. · Notify your surgeon if:  · Your temperature is greater than 100.5  · You have pain not controlled by your pain medication  · You have increased drainage from your incision  · You have increased redness or swelling in your leg  · You have chest pain, shortness of breath, or any other problems    · Do your exercises as instructed by the home physical therapist.    · Bend and straighten your operative leg every hour. Walk once an hour during normal walking hours. · During periods of inactivity or rest, your leg should be elevated with a rolled towel or folded pillow under the heel to keep the knee straight. · Do not place anything under the knee. · Do not sit in a recliner chair with the footrest elevated. · You may use ice to your knee for 20-30 minutes after exercise and as needed. Do not apply the ice pack directly to your skin. Use a barrier such as your pant leg or a thin towel. · If you have LORETO hose (the white support stockings), remove them at bedtime and re-apply the hose in the morning for the next 2 weeks. Best of luck with your new knee and [address] YOU for choosing the [address]!

## 2018-03-28 NOTE — PROGRESS NOTES
Mobility Intervention:       [x] Pt dangled at edge of bed    [] Pt assisted OOB to bedside commode    [] Pt assisted OOB to chair    [] Pt ambulated to bathroom    [x] Patient was ambulated in room/hallway    Assistive Device Utilized:       [x] Rolling walker   [] Crutches   [] Straight Cane   [] Knee immobilizer   [x] IV pole    After Mobilization:     [] Patient left in no apparent distress sitting up in chair  [x] Patient left in no apparent distress in bed  [x] Call bell left within reach  [x] SCDs on & machine turned on  [] Ice applied  [] RN notified  [] Caregiver present  [] Bed alarm activated    Reason patient not mobilized:      [] Patient refused   [] Nausea/vomiting   [] Low blood pressure   [] Drowsy/lethargic    Pain Rating:     [] 0  [x] 1  Assistive Device:        [] 2  [] 3  [] 4  [] 5  [] 6  Assistive Device:        [] 7  [] 8  [] 9  [] 10    Comments:

## 2018-03-28 NOTE — PROGRESS NOTES
Rounded on patient post total knee replacement. Activity: Reinforced importance of getting OOB for all meals, going to bathroom to help prevent blood clots. VTE prophylaxis: Instructed patient to use their SCD's when not up and walking. To use while in bed and in the chair. Educated re: ankle pumps to assist with circulation when in hospital and at home. Medications: Reviewed pain medications patient is taking and the importance of keeping pain under control to help with getting OOB and therapy. Reminded the patient to always eat a snack with their pain medication to help to prevent nausea. Encouraged patient to monitor for constipation and to take a stool softner/laxative while recovering and on pain medication. Incentive Spirometry: Reinforced use of incentive spirometer with return demonstration by patient. Wound Care: Dressing to surgical site ace wrap dry and intact. Patient instructed not to take dressing off at home. Patient given CHG wash to use in hospital and at home. Stressed importance of using a clean towel and washcloth daily. Reminded to put on clean clothes and night clothes daily. Ice Protocol: Ice man machine in place per protocol. Patient Safety: Call light in reach. Patient  reminded to call for help toget OOB or when leaving bathroom for safety. Phone and other items also within reach per patient's request.     Diet: Educated patient on the importance of eating three well balanced meals a day with protein to promote bone/muscle healing. Reminded patient to drink lots of fluids to protect kidneys from all the medications being taken currently with recovery. Patient given educational material to remind them to continue doing everything at home to prevent complications and have a successful recovery. Patient  verbalized understand of all information and education discussed. Patient  given the opportunity for asking questions.     Mobility Intervention:       [] Pt dangled at edge of bed    [] Pt assisted OOB to bedside commode    [] Pt assisted OOB to chair    [x] Pt ambulated to bathroom    [] Patient was ambulated in room/hallway    Assistive Device Utilized:       [x] Rolling walker   [] Crutches   [] Straight Cane   [] Knee immobilizer   [] IV pole    After Mobilization:     [] Patient left in no apparent distress sitting up in chair  [] Patient left in no apparent distress in bed  [] Call bell left within reach  [] SCDs on & machine turned on  [] Ice applied  [] RN notified  [] Caregiver present  [] Bed alarm activated    Reason patient not mobilized:      [] Patient refused   [] Nausea/vomiting   [] Low blood pressure   [] Drowsy/lethargic    Pain Rating:     [] 0  [x] 1  Assistive Device:        [] 2  [] 3  [] 4  [] 5  [] 6  Assistive Device:        [] 7  [] 8  [] 9  [] 10    Comments:

## 2018-03-28 NOTE — PROGRESS NOTES
Hemovac drain dc'd from left knee and 4 x 4 gauze placed over old drain site. Aquacel Ag dressing intact to midline knee incision, no drainage noted. Ice pack reapplied to knee. Tolerated well by patient.

## 2018-03-29 ENCOUNTER — TELEPHONE (OUTPATIENT)
Dept: ORTHOPEDIC SURGERY | Facility: CLINIC | Age: 63
End: 2018-03-29

## 2018-03-29 DIAGNOSIS — G89.18 POST-OPERATIVE PAIN: Primary | ICD-10-CM

## 2018-03-29 RX ORDER — OXYCODONE HYDROCHLORIDE 15 MG/1
15 TABLET ORAL
Qty: 28 TAB | Refills: 0 | Status: SHIPPED | OUTPATIENT
Start: 2018-03-29 | End: 2018-03-29

## 2018-03-29 RX ORDER — OXYCODONE HYDROCHLORIDE 15 MG/1
15 TABLET ORAL
Qty: 28 TAB | Refills: 0 | Status: SHIPPED | OUTPATIENT
Start: 2018-03-29 | End: 2018-04-06 | Stop reason: SDUPTHER

## 2018-03-29 NOTE — TELEPHONE ENCOUNTER
Please have him rest, elevate and ice. He can take an NSAID for break through pain. I will change his pain medication to see if we can get pain relief. Order is in.  Please have him stop the percocet and try Roxicodone 15 mg

## 2018-03-29 NOTE — TELEPHONE ENCOUNTER
Spoke with patient's wife and informed her of the message below. CARA King put in order for Roxicodone rx and colace for constipation. Patient's wife informed that rx is ready to be picked up at the [facility] location.

## 2018-03-29 NOTE — TELEPHONE ENCOUNTER
Patient's wife Jessica Negron (on HIPPA) states patient was doing good yesterday but is in a lot of pain today. He has been doing ice and taking pain meds but hasn't helped.   Patient can be reached at [phone]

## 2018-03-30 ENCOUNTER — HOME CARE VISIT (OUTPATIENT)
Dept: SCHEDULING | Facility: HOME HEALTH | Age: 63
End: 2018-03-30
Payer: COMMERCIAL

## 2018-03-30 ENCOUNTER — HOME CARE VISIT (OUTPATIENT)
Dept: HOME HEALTH SERVICES | Facility: HOME HEALTH | Age: 63
End: 2018-03-30
Payer: COMMERCIAL

## 2018-03-30 PROCEDURE — G0299 HHS/HOSPICE OF RN EA 15 MIN: HCPCS

## 2018-03-30 PROCEDURE — 400013 HH SOC

## 2018-03-30 PROCEDURE — G0151 HHCP-SERV OF PT,EA 15 MIN: HCPCS

## 2018-03-31 VITALS
OXYGEN SATURATION: 96 % | SYSTOLIC BLOOD PRESSURE: 130 MMHG | TEMPERATURE: 99.7 F | HEART RATE: 97 BPM | DIASTOLIC BLOOD PRESSURE: 74 MMHG

## 2018-03-31 VITALS
HEART RATE: 88 BPM | DIASTOLIC BLOOD PRESSURE: 80 MMHG | OXYGEN SATURATION: 96 % | SYSTOLIC BLOOD PRESSURE: 120 MMHG | TEMPERATURE: 98 F | RESPIRATION RATE: 16 BRPM

## 2018-04-01 ENCOUNTER — HOME CARE VISIT (OUTPATIENT)
Dept: SCHEDULING | Facility: HOME HEALTH | Age: 63
End: 2018-04-01
Payer: COMMERCIAL

## 2018-04-01 PROCEDURE — G0157 HHC PT ASSISTANT EA 15: HCPCS

## 2018-04-02 ENCOUNTER — TELEPHONE (OUTPATIENT)
Dept: ORTHOPEDIC SURGERY | Age: 63
End: 2018-04-02

## 2018-04-02 ENCOUNTER — TELEPHONE (OUTPATIENT)
Dept: ORTHOPEDIC SURGERY | Facility: CLINIC | Age: 63
End: 2018-04-02

## 2018-04-02 ENCOUNTER — HOME CARE VISIT (OUTPATIENT)
Dept: HOME HEALTH SERVICES | Facility: HOME HEALTH | Age: 63
End: 2018-04-02
Payer: COMMERCIAL

## 2018-04-02 ENCOUNTER — HOME CARE VISIT (OUTPATIENT)
Dept: SCHEDULING | Facility: HOME HEALTH | Age: 63
End: 2018-04-02
Payer: MEDICARE

## 2018-04-02 PROCEDURE — 3331090002 HH PPS REVENUE DEBIT

## 2018-04-02 PROCEDURE — G0157 HHC PT ASSISTANT EA 15: HCPCS

## 2018-04-02 PROCEDURE — 3331090001 HH PPS REVENUE CREDIT

## 2018-04-02 PROCEDURE — 400013 HH SOC

## 2018-04-02 PROCEDURE — G0299 HHS/HOSPICE OF RN EA 15 MIN: HCPCS

## 2018-04-02 NOTE — TELEPHONE ENCOUNTER
Patient had Lt TKR on 3/27/18. Patient has swelling in his foot and his [address] will be gong to see him today and will call our office for instructions.

## 2018-04-02 NOTE — TELEPHONE ENCOUNTER
Called and spoke to Jen. Gave verbal order for coumadin.     3mg today  5mg Tuesday  3mg Wednesday     Recheck INR on thursday

## 2018-04-02 NOTE — TELEPHONE ENCOUNTER
[facility] IS CALLING TO SEE IF THEY CAN SWITCH THE PATIENT TO 3 TIMES A WEEK FOR 2 WEEKS. PATIENT HAS TO PAY A COPAY FOR EACH VISIT.     PLEASE ADVISE

## 2018-04-02 NOTE — TELEPHONE ENCOUNTER
TIM WITH [facility]     SHE DID NOT HAVE THE P.T. BECAUSE THE MACHINE WAS BROKEN.        INR 1.8    COUMADIN 8MG DAILY   LOVANOX INJECTIONS DAILY BUT HAD LAST ONE TODAY 4/2/18

## 2018-04-03 ENCOUNTER — HOME CARE VISIT (OUTPATIENT)
Dept: SCHEDULING | Facility: HOME HEALTH | Age: 63
End: 2018-04-03
Payer: MEDICARE

## 2018-04-03 ENCOUNTER — HOME CARE VISIT (OUTPATIENT)
Dept: HOME HEALTH SERVICES | Facility: HOME HEALTH | Age: 63
End: 2018-04-03
Payer: COMMERCIAL

## 2018-04-03 VITALS
OXYGEN SATURATION: 93 % | TEMPERATURE: 98.5 F | DIASTOLIC BLOOD PRESSURE: 64 MMHG | HEART RATE: 85 BPM | SYSTOLIC BLOOD PRESSURE: 118 MMHG

## 2018-04-03 VITALS
TEMPERATURE: 98.4 F | DIASTOLIC BLOOD PRESSURE: 64 MMHG | HEART RATE: 78 BPM | OXYGEN SATURATION: 96 % | SYSTOLIC BLOOD PRESSURE: 110 MMHG

## 2018-04-03 PROCEDURE — 3331090002 HH PPS REVENUE DEBIT

## 2018-04-03 PROCEDURE — 3331090001 HH PPS REVENUE CREDIT

## 2018-04-03 PROCEDURE — G0157 HHC PT ASSISTANT EA 15: HCPCS

## 2018-04-03 NOTE — TELEPHONE ENCOUNTER
Maria R Arnold from [facility] called in requesting to see if she can switch the patient to 3x a week for 2 weeks because he has a co-pay and not currently working. Please advise Maria R Arnold at [phone].

## 2018-04-03 NOTE — TELEPHONE ENCOUNTER
Called patient to access and he stated since his surgery both feet have been swollen but the left has been the worse. The [facility] nurse and Therapist feel it is normal following surgery. It is not red or hot. The  Nurse was back yesterday and was not concerned. He has completed the Lovenox and has been instructed on how to take his coumadin for the next three days and will then be re-evaluated. He was questioning why he wasn't back to his pre-surgery coumadin dosage. I told the patient it would take a while to get back to where he was before surgery and he should follow the instructions he was given.

## 2018-04-04 ENCOUNTER — OFFICE VISIT (OUTPATIENT)
Dept: INTERNAL MEDICINE CLINIC | Age: 63
End: 2018-04-04

## 2018-04-04 ENCOUNTER — TELEPHONE (OUTPATIENT)
Dept: INTERNAL MEDICINE CLINIC | Age: 63
End: 2018-04-04

## 2018-04-04 ENCOUNTER — OFFICE VISIT (OUTPATIENT)
Dept: VASCULAR SURGERY | Age: 63
End: 2018-04-04

## 2018-04-04 ENCOUNTER — HOME CARE VISIT (OUTPATIENT)
Dept: HOME HEALTH SERVICES | Facility: HOME HEALTH | Age: 63
End: 2018-04-04
Payer: COMMERCIAL

## 2018-04-04 ENCOUNTER — HOSPITAL ENCOUNTER (OUTPATIENT)
Dept: LAB | Age: 63
Discharge: HOME | End: 2018-04-04
Payer: COMMERCIAL

## 2018-04-04 VITALS
WEIGHT: 221 LBS | OXYGEN SATURATION: 95 % | HEART RATE: 85 BPM | TEMPERATURE: 98.9 F | DIASTOLIC BLOOD PRESSURE: 66 MMHG | SYSTOLIC BLOOD PRESSURE: 126 MMHG | BODY MASS INDEX: 31.64 KG/M2 | RESPIRATION RATE: 18 BRPM | HEIGHT: 70 IN

## 2018-04-04 DIAGNOSIS — R60.0 LEG EDEMA, LEFT: Primary | ICD-10-CM

## 2018-04-04 DIAGNOSIS — R60.0 EDEMA OF EXTREMITIES: Primary | ICD-10-CM

## 2018-04-04 DIAGNOSIS — R60.0 LEG EDEMA, LEFT: ICD-10-CM

## 2018-04-04 LAB
ALBUMIN SERPL-MCNC: 2.9 G/DL (ref 3.4–5)
ALBUMIN/GLOB SERPL: 0.9 {RATIO} (ref 0.8–1.7)
ALP SERPL-CCNC: 52 U/L (ref 45–117)
ALT SERPL-CCNC: 30 U/L (ref 16–61)
ANION GAP SERPL CALC-SCNC: 7 MMOL/L (ref 3–18)
AST SERPL-CCNC: 21 U/L (ref 15–37)
BASOPHILS # BLD: 0 K/UL (ref 0–0.06)
BASOPHILS NFR BLD: 1 % (ref 0–2)
BILIRUB SERPL-MCNC: 0.3 MG/DL (ref 0.2–1)
BUN SERPL-MCNC: 14 MG/DL (ref 7–18)
BUN/CREAT SERPL: 16 (ref 12–20)
CALCIUM SERPL-MCNC: 9.2 MG/DL (ref 8.5–10.1)
CHLORIDE SERPL-SCNC: 103 MMOL/L (ref 100–108)
CO2 SERPL-SCNC: 30 MMOL/L (ref 21–32)
CREAT SERPL-MCNC: 0.87 MG/DL (ref 0.6–1.3)
CRP SERPL-MCNC: 6.1 MG/DL (ref 0–0.3)
DIFFERENTIAL METHOD BLD: ABNORMAL
EOSINOPHIL # BLD: 0.4 K/UL (ref 0–0.4)
EOSINOPHIL NFR BLD: 6 % (ref 0–5)
ERYTHROCYTE [DISTWIDTH] IN BLOOD BY AUTOMATED COUNT: 12.3 % (ref 11.6–14.5)
GLOBULIN SER CALC-MCNC: 3.3 G/DL (ref 2–4)
GLUCOSE SERPL-MCNC: 127 MG/DL (ref 74–99)
HCT VFR BLD AUTO: 33.2 % (ref 36–48)
HGB BLD-MCNC: 10.8 G/DL (ref 13–16)
LYMPHOCYTES # BLD: 0.9 K/UL (ref 0.9–3.6)
LYMPHOCYTES NFR BLD: 15 % (ref 21–52)
MCH RBC QN AUTO: 31.6 PG (ref 24–34)
MCHC RBC AUTO-ENTMCNC: 32.5 G/DL (ref 31–37)
MCV RBC AUTO: 97.1 FL (ref 74–97)
MONOCYTES # BLD: 0.8 K/UL (ref 0.05–1.2)
MONOCYTES NFR BLD: 12 % (ref 3–10)
NEUTS SEG # BLD: 4.3 K/UL (ref 1.8–8)
NEUTS SEG NFR BLD: 66 % (ref 40–73)
PLATELET # BLD AUTO: 312 K/UL (ref 135–420)
PMV BLD AUTO: 9.6 FL (ref 9.2–11.8)
POTASSIUM SERPL-SCNC: 4 MMOL/L (ref 3.5–5.5)
PROT SERPL-MCNC: 6.2 G/DL (ref 6.4–8.2)
RBC # BLD AUTO: 3.42 M/UL (ref 4.7–5.5)
SODIUM SERPL-SCNC: 140 MMOL/L (ref 136–145)
WBC # BLD AUTO: 6.4 K/UL (ref 4.6–13.2)

## 2018-04-04 PROCEDURE — 86140 C-REACTIVE PROTEIN: CPT | Performed by: INTERNAL MEDICINE

## 2018-04-04 PROCEDURE — 80053 COMPREHEN METABOLIC PANEL: CPT | Performed by: INTERNAL MEDICINE

## 2018-04-04 PROCEDURE — 3331090002 HH PPS REVENUE DEBIT

## 2018-04-04 PROCEDURE — 3331090001 HH PPS REVENUE CREDIT

## 2018-04-04 PROCEDURE — 85025 COMPLETE CBC W/AUTO DIFF WBC: CPT | Performed by: INTERNAL MEDICINE

## 2018-04-04 PROCEDURE — 36415 COLL VENOUS BLD VENIPUNCTURE: CPT | Performed by: INTERNAL MEDICINE

## 2018-04-04 NOTE — TELEPHONE ENCOUNTER
Dr. Nehemiah Kim was made aware of results. He requested patient be notified of results and to continue what he's doing and follow up with Dr. Nehemiah Kim on Monday as scheduled. A message was left on patient's voice mail to call the office.

## 2018-04-04 NOTE — PROCEDURES
[facility]  *** FINAL REPORT ***    Name: Dorman Alpers  MRN: [id number]       Outpatient  : 1955  HIS Order #: [id number]  [address] [facility] Visit #: [id number]  Date: 2018    TYPE OF TEST: Peripheral Venous Testing    REASON FOR TEST  Pain in limb, Edema    Right Leg:-  Deep venous thrombosis:           No  Superficial venous thrombosis:    No  Deep venous insufficiency:        Not examined  Superficial venous insufficiency: Not examined    Left Leg:-  Deep venous thrombosis:           No  Superficial venous thrombosis:    No  Deep venous insufficiency:        Not examined  Superficial venous insufficiency: Not examined      INTERPRETATION/FINDINGS  Duplex images were obtained using 2-D gray scale, color flow and  spectral doppler analysis. 1. No evidence of deep venous thrombosis identified in the common  femoral, femoral, profunda, popliteal, posterior tibial or peroneal  veins bilaterally. 2. The great saphenous veins are patent at the sapheno femoral  junction bilaterally without evidence of thrombus. 3. Reflux noted in the right SSV (4.6 sec). 4. Right groin mass noted consistent with enlarged lymph node  measuring 1.8 x 3.8 cm Trv.  5. Triphasic doppler signals in the tibial arteries at rest.    ADDITIONAL COMMENTS    I have personally reviewed the data relevant to the interpretation of  this  study. TECHNOLOGIST: Lucinda Joseph RVT, BS  Signed: 2018 12:01 PM    PHYSICIAN: Za Barboza MD  Signed: 2018 02:11 PM

## 2018-04-04 NOTE — PROGRESS NOTES
The patient presents to the office today with the chief complaint of Left Leg Swelling    HPI    The patient is one week after discharge for a left total knee replacement. His left calf swelled shortly after return home. The left calf and foot continue to swell and now the left foot is painful. The patient remains on Coumadin for hypercoagulable state. INR this AM is 1.3      Review of Systems   Respiratory: Negative for shortness of breath. Cardiovascular: Positive for leg swelling. Allergies   Allergen Reactions    Augmentin [Amoxicillin-Pot Clavulanate] Itching    Hibiclens [Chlorhexidine Gluconate] Itching    Penicillins Rash       Current Outpatient Prescriptions   Medication Sig Dispense Refill    oxyCODONE IR (ROXICODONE) 15 mg immediate release tablet Take 1 Tab by mouth every six (6) hours as needed for Pain. Max Daily Amount: 60 mg. 28 Tab 0    docusate sodium (COLACE) 50 mg capsule Take 1 Cap by mouth two (2) times a day for 90 days. 60 Cap 2    celecoxib (CELEBREX) 200 mg capsule Take 1 Cap by mouth two (2) times a day for 90 days. 60 Cap 2    warfarin (COUMADIN) 3 mg tablet Take 1 Tab by mouth daily for 21 days. (Patient taking differently: Take 1 Tab by mouth two (2) days a week. TAKE COUMADIN 3 MG TONIGHT ALTERNATING WITH COUMADIN 5 MG. CHECK PROTIME ON THURSDAY 4/5/18) 30 Tab 0    lisinopril-hydroCHLOROthiazide (PRINZIDE, ZESTORETIC) 20-12.5 mg per tablet TAKE 1 TABLET BY MOUTH DAILY 90 Tab 0    labetalol (NORMODYNE) 100 mg tablet TAKE ONE TABLET BY MOUTH TWICE DAILY 180 Tab 0    L. gasseri-B. bifidum-B longum ([facility] GI-View) 1.5 billion cell cap Take  by mouth daily.       azelastine (ASTELIN) 137 mcg (0.1 %) nasal spray 1 spray up each nostril twice per day 1 Bottle 1    warfarin (COUMADIN) 5 mg tablet TAKE 2 AND 1/2 TABLETS BY MOUTH DAILY OR AS DIRECTED (Patient taking differently: TAKE 2 AND 1/2 TABLETS BY MOUTH DAILY OR AS DIRECTED   taking 2 tablets  Monday, Thursday and Saturday and take 2 1/2 tablets all other days) 75 Tab 0    metaxalone (SKELAXIN) 800 mg tablet Take 1 Tab by mouth three (3) times daily. Indications: Muscle Spasm (Patient taking differently: Take 800 mg by mouth three (3) times daily as needed. Indications: Muscle Spasm) 90 Tab 2    montelukast (SINGULAIR) 10 mg tablet TAKE 1 TABLET BY MOUTH EVERY DAY (Patient taking differently: TAKE 1 TABLET BY MOUTH EVERY HS) 90 Tab 0    butalbital-acetaminophen-caff (FIORICET) -40 mg per capsule Take 2 capsules every 8 hours as needed for headache 540 Cap 3    nystatin (MYCOSTATIN) topical cream Apply  to affected area two (2) times a day. (Patient taking differently: Apply  to affected area two (2) times daily as needed.) 15 g 0    lansoprazole (PREVACID) 30 mg capsule TAKE 1 CAPSULE DAILY BEFOREBREAKFAST 90 Cap 3    raNITIdine (ZANTAC) 150 mg tablet TK 1 T PO HS  5    acetaminophen (TYLENOL) 500 mg tablet Take 1,000 mg by mouth every six (6) hours as needed for Pain.  oxyCODONE-acetaminophen (PERCOCET)  mg per tablet Take 1-2 Tabs by mouth every six (6) hours as needed for Pain. Max Daily Amount: 8 Tabs. 60 Tab 0    predniSONE (DELTASONE) 20 mg tablet 2 tabs daily x 2 days, 1 tab daily x 2 days, 1/2 tab daily x 4 days 8 Tab 0    red yeast rice extract 600 mg cap Take 1,200 mg by mouth daily.          Past Medical History:   Diagnosis Date    Arthritis     Bleeding     Blood clot in the legs     Chronic pain     knee and shoulder    Esophageal reflux     GERD (gastroesophageal reflux disease)     Headache(784.0)     migraine    High cholesterol     Hypertension     Lower back pain 11/6/2010    Other chest pain     Personal history of venous thrombosis and embolism     Pure hypercholesterolemia     Right buttock pain 11/6/2010    Right foot pain     Rotator cuff tear     left-since 2010, worsened tear Jan.    Spinal stenosis     Tendonitis, tibialis     anterior    Thromboembolus ([facility] [address]     3 after sx last one 2000       Past Surgical History:   Procedure Laterality Date    FOOT/TOES SURGERY PROC UNLISTED      HX BACK SURGERY      HX ORTHOPAEDIC  06-25-12    Right foot with excision of bursa and adipose tissue from fifth metatarsal base by Dr. Rachael Dale      lower back (1992 and 2000)    HX OTHER SURGICAL      left foot (2008)    LAMINOTOMY      NERVE BLOCK         Social History     Social History    Marital status:      Spouse name: N/A    Number of children: N/A    Years of education: N/A     Occupational History    Not on file. Social History Main Topics    Smoking status: Never Smoker    Smokeless tobacco: Never Used    Alcohol use No    Drug use: No    Sexual activity: Not Currently     Other Topics Concern    Not on file     Social History Narrative       Patient does not have an advanced directive on file    Visit Vitals    /66 (BP 1 Location: Right arm, BP Patient Position: Sitting)    Pulse 85    Temp 98.9 °F (37.2 °C) (Tympanic)    Resp 18    Ht 5' 10\" (1.778 m)    Wt 221 lb (100.2 kg)    SpO2 95%    BMI 31.71 kg/m2       Physical Exam   Musculoskeletal:   Left calf with 3 + doughy edema in the foot with tense edema in the calf. There is a dark red hue surrounding the bandage of the left knee (by history this has not changed)       BMI:  BMI is high but it was not addressed during this visit due to an acute medical problem        Admission on 03/27/2018, Discharged on 03/28/2018   Component Date Value Ref Range Status    INR (POC) 03/27/2018 1.0  <1.2   Final    The intended use of the i-STAT PT/INR is for monitoring oral anticoagulant therapy and not for evaluating individual factor deficiencies.  WBC 03/28/2018 9.6  4.6 - 13.2 K/uL Final    RBC 03/28/2018 3.73* 4.70 - 5.50 M/uL Final    HGB 03/28/2018 11.7* 13.0 - 16.0 g/dL Final    This is a post-surgery specimen.     HCT 03/28/2018 35.6* 36.0 - 48.0 % Final    MCV 03/28/2018 95.4  74.0 - 97.0 FL Final    MCH 03/28/2018 31.4  24.0 - 34.0 PG Final    MCHC 03/28/2018 32.9  31.0 - 37.0 g/dL Final    RDW 03/28/2018 13.0  11.6 - 14.5 % Final    PLATELET 46/74/4008 396  135 - 420 K/uL Final    MPV 03/28/2018 10.7  9.2 - 11.8 FL Final    NEUTROPHILS 03/28/2018 73  40 - 73 % Final    LYMPHOCYTES 03/28/2018 14* 21 - 52 % Final    MONOCYTES 03/28/2018 13* 3 - 10 % Final    EOSINOPHILS 03/28/2018 0  0 - 5 % Final    BASOPHILS 03/28/2018 0  0 - 2 % Final    ABS. NEUTROPHILS 03/28/2018 7.0  1.8 - 8.0 K/UL Final    ABS. LYMPHOCYTES 03/28/2018 1.3  0.9 - 3.6 K/UL Final    ABS. MONOCYTES 03/28/2018 1.2  0.05 - 1.2 K/UL Final    ABS. EOSINOPHILS 03/28/2018 0.0  0.0 - 0.4 K/UL Final    ABS.  BASOPHILS 03/28/2018 0.0  0.0 - 0.1 K/UL Final    DF 03/28/2018 AUTOMATED    Final    Prothrombin time 03/28/2018 14.9  11.5 - 15.2 sec Final    INR 03/28/2018 1.2  0.8 - 1.2   Final    Comment:            INR Therapeutic Ranges         (on stable oral anticoagulant):     INDICATION                INR  DVT/PE/Atrial Fib          2.0-3.0  MI/Mechanical Heart Valve  2.5-3.5     Hospital Outpatient Visit on 03/22/2018   Component Date Value Ref Range Status    Color 03/22/2018 YELLOW    Final    Appearance 03/22/2018 CLEAR    Final    Specific gravity 03/22/2018 1.006  1.005 - 1.030   Final    pH (UA) 03/22/2018 6.5  5.0 - 8.0   Final    Protein 03/22/2018 NEGATIVE   NEG mg/dL Final    Glucose 03/22/2018 NEGATIVE   NEG mg/dL Final    Ketone 03/22/2018 NEGATIVE   NEG mg/dL Final    Bilirubin 03/22/2018 NEGATIVE   NEG   Final    Blood 03/22/2018 NEGATIVE   NEG   Final    Urobilinogen 03/22/2018 0.2  0.2 - 1.0 EU/dL Final    Nitrites 03/22/2018 NEGATIVE   NEG   Final    Leukocyte Esterase 03/22/2018 NEGATIVE   NEG   Final    Special Requests: 03/22/2018 NO SPECIAL REQUESTS    Final    Culture result: 03/22/2018 NO GROWTH 1 DAY    Final   Hospital Outpatient Visit on 03/16/2018   Component Date Value Ref Range Status    WBC 03/16/2018 8.5  4.6 - 13.2 K/uL Final    RBC 03/16/2018 4.48* 4.70 - 5.50 M/uL Final    HGB 03/16/2018 14.7  13.0 - 16.0 g/dL Final    HCT 03/16/2018 42.3  36.0 - 48.0 % Final    MCV 03/16/2018 94.4  74.0 - 97.0 FL Final    MCH 03/16/2018 32.8  24.0 - 34.0 PG Final    MCHC 03/16/2018 34.8  31.0 - 37.0 g/dL Final    RDW 03/16/2018 13.0  11.6 - 14.5 % Final    PLATELET 14/12/0858 043  135 - 420 K/uL Final    MPV 03/16/2018 11.3  9.2 - 11.8 FL Final    NEUTROPHILS 03/16/2018 71  40 - 73 % Final    LYMPHOCYTES 03/16/2018 17* 21 - 52 % Final    MONOCYTES 03/16/2018 11* 3 - 10 % Final    EOSINOPHILS 03/16/2018 1  0 - 5 % Final    BASOPHILS 03/16/2018 0  0 - 2 % Final    ABS. NEUTROPHILS 03/16/2018 6.0  1.8 - 8.0 K/UL Final    ABS. LYMPHOCYTES 03/16/2018 1.5  0.9 - 3.6 K/UL Final    ABS. MONOCYTES 03/16/2018 0.9  0.05 - 1.2 K/UL Final    ABS. EOSINOPHILS 03/16/2018 0.1  0.0 - 0.4 K/UL Final    ABS.  BASOPHILS 03/16/2018 0.0  0.0 - 0.06 K/UL Final    DF 03/16/2018 AUTOMATED    Final    Prothrombin time 03/16/2018 23.5* 11.5 - 15.2 sec Final    INR 03/16/2018 2.2* 0.8 - 1.2   Final    Comment:            INR Therapeutic Ranges         (on stable oral anticoagulant):     INDICATION                INR  DVT/PE/Atrial Fib          2.0-3.0  MI/Mechanical Heart Valve  2.5-3.5      aPTT 03/16/2018 32.2  23.0 - 36.4 SEC Final    Sodium 03/16/2018 142  136 - 145 mmol/L Final    Potassium 03/16/2018 4.0  3.5 - 5.5 mmol/L Final    Chloride 03/16/2018 104  100 - 108 mmol/L Final    CO2 03/16/2018 30  21 - 32 mmol/L Final    Anion gap 03/16/2018 8  3.0 - 18 mmol/L Final    Glucose 03/16/2018 126* 74 - 99 mg/dL Final    BUN 03/16/2018 16  7.0 - 18 MG/DL Final    Creatinine 03/16/2018 0.98  0.6 - 1.3 MG/DL Final    BUN/Creatinine ratio 03/16/2018 16  12 - 20   Final    GFR est AA 03/16/2018 >60  >60 ml/min/1.73m2 Final    GFR est non-AA 03/16/2018 >60  >60 ml/min/1.73m2 Final    Comment: (NOTE)  Estimated GFR is calculated using the Modification of Diet in Renal   Disease (MDRD) Study equation, reported for both  Americans   (GFRAA) and non- Americans (GFRNA), and normalized to 1.73m2   body surface area. The physician must decide which value applies to   the patient. The MDRD study equation should only be used in   individuals age 25 or older. It has not been validated for the   following: pregnant women, patients with serious comorbid conditions,   or on certain medications, or persons with extremes of body size,   muscle mass, or nutritional status.       Calcium 03/16/2018 9.1  8.5 - 10.1 MG/DL Final    Crossmatch Expiration 03/16/2018 03/30/2018   Final    ABO/Rh(D) 03/16/2018 B POSITIVE   Final    Antibody screen 03/16/2018 NEG   Final    Color 03/16/2018 YELLOW    Final    Appearance 03/16/2018 CLEAR    Final    Specific gravity 03/16/2018 1.015  1.005 - 1.030   Final    pH (UA) 03/16/2018 6.5  5.0 - 8.0   Final    Protein 03/16/2018 NEGATIVE   NEG mg/dL Final    Glucose 03/16/2018 NEGATIVE   NEG mg/dL Final    Ketone 03/16/2018 NEGATIVE   NEG mg/dL Final    Bilirubin 03/16/2018 NEGATIVE   NEG   Final    Blood 03/16/2018 NEGATIVE   NEG   Final    Urobilinogen 03/16/2018 0.2  0.2 - 1.0 EU/dL Final    Nitrites 03/16/2018 NEGATIVE   NEG   Final    Leukocyte Esterase 03/16/2018 TRACE* NEG   Final    Special Requests: 03/16/2018 NO SPECIAL REQUESTS    Final    Culture result: 03/16/2018 NO GROWTH 1 DAY    Final    WBC 03/16/2018 0 to 2  0 - 4 /hpf Final    RBC 03/16/2018 NONE  0 - 5 /hpf Final    Epithelial cells 03/16/2018 FEW  0 - 5 /lpf Final    Bacteria 03/16/2018 NEGATIVE   NEG /hpf Final   Office Visit on 02/19/2018   Component Date Value Ref Range Status    VALID INTERNAL CONTROL POC 02/19/2018 Yes   Final    QuickVue Influenza test 02/19/2018 Positive  Negative Final    Influenza A    VALID INTERNAL CONTROL POC 02/19/2018 Yes   Final    QuickVue Influenza test 02/19/2018 Negative  Negative Final    Influenza B   Hospital Outpatient Visit on 01/31/2018   Component Date Value Ref Range Status    WBC 01/31/2018 7.3  4.6 - 13.2 K/uL Final    RBC 01/31/2018 4.42* 4.70 - 5.50 M/uL Final    HGB 01/31/2018 14.7  13.0 - 16.0 g/dL Final    HCT 01/31/2018 42.9  36.0 - 48.0 % Final    MCV 01/31/2018 97.1* 74.0 - 97.0 FL Final    MCH 01/31/2018 33.3  24.0 - 34.0 PG Final    MCHC 01/31/2018 34.3  31.0 - 37.0 g/dL Final    RDW 01/31/2018 12.5  11.6 - 14.5 % Final    PLATELET 64/94/3589 428  135 - 420 K/uL Final    MPV 01/31/2018 11.3  9.2 - 11.8 FL Final    NEUTROPHILS 01/31/2018 66  40 - 73 % Final    LYMPHOCYTES 01/31/2018 20* 21 - 52 % Final    MONOCYTES 01/31/2018 12* 3 - 10 % Final    EOSINOPHILS 01/31/2018 2  0 - 5 % Final    BASOPHILS 01/31/2018 0  0 - 2 % Final    ABS. NEUTROPHILS 01/31/2018 4.8  1.8 - 8.0 K/UL Final    ABS. LYMPHOCYTES 01/31/2018 1.4  0.9 - 3.6 K/UL Final    ABS. MONOCYTES 01/31/2018 0.9  0.05 - 1.2 K/UL Final    ABS. EOSINOPHILS 01/31/2018 0.2  0.0 - 0.4 K/UL Final    ABS.  BASOPHILS 01/31/2018 0.0  0.0 - 0.06 K/UL Final    DF 01/31/2018 AUTOMATED    Final    Prothrombin time 01/31/2018 17.0* 11.5 - 15.2 sec Final    INR 01/31/2018 1.4* 0.8 - 1.2   Final    Comment:            INR Therapeutic Ranges         (on stable oral anticoagulant):     INDICATION                INR  DVT/PE/Atrial Fib          2.0-3.0  MI/Mechanical Heart Valve  2.5-3.5      aPTT 01/31/2018 31.3  23.0 - 36.4 SEC Final    Sodium 01/31/2018 140  136 - 145 mmol/L Final    Potassium 01/31/2018 4.1  3.5 - 5.5 mmol/L Final    Chloride 01/31/2018 103  100 - 108 mmol/L Final    CO2 01/31/2018 30  21 - 32 mmol/L Final    Anion gap 01/31/2018 7  3.0 - 18 mmol/L Final    Glucose 01/31/2018 89  74 - 99 mg/dL Final    BUN 01/31/2018 12  7.0 - 18 MG/DL Final    Creatinine 01/31/2018 0.92  0.6 - 1.3 MG/DL Final    BUN/Creatinine ratio 01/31/2018 13  12 - 20   Final    GFR est AA 01/31/2018 >60  >60 ml/min/1.73m2 Final    GFR est non-AA 01/31/2018 >60  >60 ml/min/1.73m2 Final    Comment: (NOTE)  Estimated GFR is calculated using the Modification of Diet in Renal   Disease (MDRD) Study equation, reported for both  Americans   (GFRAA) and non- Americans (GFRNA), and normalized to 1.73m2   body surface area. The physician must decide which value applies to   the patient. The MDRD study equation should only be used in   individuals age 25 or older. It has not been validated for the   following: pregnant women, patients with serious comorbid conditions,   or on certain medications, or persons with extremes of body size,   muscle mass, or nutritional status.  Calcium 01/31/2018 9.3  8.5 - 10.1 MG/DL Final    Albumin 01/31/2018 3.9  3.4 - 5.0 g/dL Final    Hemoglobin A1c 01/31/2018 5.8* 4.2 - 5.6 % Final    Comment: (NOTE)  HbA1C Interpretive Ranges  <5.7              Normal  5.7 - 6.4         Consider Prediabetes  >6.5              Consider Diabetes      Est. average glucose 01/31/2018 120  mg/dL Final    Comment: (NOTE)  The eAG should be interpreted with patient characteristics in mind   since ethnicity, interindividual differences, red cell lifespan,   variation in rates of glycation, etc. may affect the validity of the   calculation.       Crossmatch Expiration 01/31/2018 02/14/2018   Final    ABO/Rh(D) 01/31/2018 B POSITIVE   Final    Antibody screen 01/31/2018 NEG   Final    Color 01/31/2018 YELLOW    Final    Appearance 01/31/2018 CLEAR    Final    Specific gravity 01/31/2018 1.012  1.005 - 1.030   Final    pH (UA) 01/31/2018 7.0  5.0 - 8.0   Final    Protein 01/31/2018 NEGATIVE   NEG mg/dL Final    Glucose 01/31/2018 NEGATIVE   NEG mg/dL Final    Ketone 01/31/2018 NEGATIVE   NEG mg/dL Final    Bilirubin 01/31/2018 NEGATIVE   NEG   Final    Blood 01/31/2018 NEGATIVE   NEG   Final  Urobilinogen 01/31/2018 0.2  0.2 - 1.0 EU/dL Final    Nitrites 01/31/2018 NEGATIVE   NEG   Final    Leukocyte Esterase 01/31/2018 NEGATIVE   NEG   Final    Special Requests: 01/31/2018 NO SPECIAL REQUESTS    Final    Culture result: 01/31/2018 NO GROWTH 1 DAY    Final    Ventricular Rate 01/31/2018 62  BPM Final    Atrial Rate 01/31/2018 62  BPM Final    P-R Interval 01/31/2018 180  ms Final    QRS Duration 01/31/2018 86  ms Final    Q-T Interval 01/31/2018 394  ms Final    QTC Calculation (Bezet) 01/31/2018 399  ms Final    Calculated P Axis 01/31/2018 58  degrees Final    Calculated R Axis 01/31/2018 12  degrees Final    Calculated T Axis 01/31/2018 64  degrees Final    Diagnosis 01/31/2018    Final                    Value:Normal sinus rhythm  Possible Left atrial enlargement  Cannot exclude Inferior infarct , age undetermined  Nonspecific ST and T wave abnormality Lateral leads  Borderline ECG  When compared with ECG of 11-JUN-2012 12:04,  No significant change was found  Confirmed by Thiago Baker ([id number]) on 2/1/2018 7:08:50 AM     Hospital Outpatient Visit on 01/24/2018   Component Date Value Ref Range Status    Creatinine, POC 01/24/2018 0.9  0.6 - 1.3 MG/DL Final    GFRAA, POC 01/24/2018 >60  >60 ml/min/1.73m2 Final    GFRNA, POC 01/24/2018 >60  >60 ml/min/1.73m2 Final    Comment: Estimated GFR is calculated using the IDMS-traceable Modification of Diet in Renal Disease (MDRD) Study equation, reported for both  Americans (GFRAA) and non- Americans (GFRNA), and normalized to 1.73m2 body surface area. The physician must decide which value applies to the patient. The MDRD study equation should only be used in individuals age 25 or older. It has not been validated for the following: pregnant women, patients with serious comorbid conditions, or on certain medications, or persons with extremes of body size, muscle mass, or nutritional status.      Office Visit on 01/18/2018 Component Date Value Ref Range Status    VALID INTERNAL CONTROL POC 01/18/2018 Yes   Final    INR POC 01/18/2018 2.1   Final       .No results found for any visits on 04/04/18. Assessment / Plan      ICD-10-CM ICD-9-CM    1. Leg edema, left R60.0 782.3 CBC WITH AUTOMATED DIFF      METABOLIC PANEL, COMPREHENSIVE      C REACTIVE PROTEIN, QT      DUPLEX LOWER EXT VENOUS BILAT       LE edema -- rule out DVT    Will obtain noninvasive testing to rule out DVT  Coumadin 8 mg daily for now  he was advised to continue his maintenance medications      Follow-up Disposition:  Return in about 5 days (around 4/9/2018). I asked Laxmi Hinojosa if he has any questions and I answered the questions.   Laxmi Hinojosa states that he understands the treatment plan and agrees with the treatment plan

## 2018-04-04 NOTE — MR AVS SNAPSHOT
[facility] 
 
 
 [address] [facility], [address] [facility] [address] 
[phone] Patient: Cinda Cummings MRN: [id number] WUG:3/35/[birth year]153 Visit Information Date & Time Provider Department Dept. Phone Encounter #  
 4/4/2018  8:00 AM Nahum Corona MD [facility] INTERNAL MEDICINE [facility] [phone] [id number] Follow-up Instructions Return in about 5 days (around 4/9/2018). Your Appointments 4/4/2018 11:00 AM  
PROCEDURE with [facility] IMAGING 2 [facility] ([facility]) Appt Note: nima dodson acv alex wilson call report to [phone], collette to fax order over [address] [place], [place] [phone] [address] Severe [address]  
  
    
 4/9/2018 11:00 AM  
Follow Up with Nahum Corona MD  
[facility] INTERNAL MEDICINE OF [facility]) Appt Note: f/u testing [address] [facility], [address] [facility]  
  
   
 [address] [facility], [address] [facility] [address] 4/12/2018  9:00 AM  
[facility] - Erika Paula [facility]) Appt Note: LEFT TOTAL KNEE ARTHROPLASTY  
 [address] [facility], [address] [place] [address]  
[phone]  
  
   
 [address] [facility], [address] [address]  
  
    
 4/30/2018  9:30 AM  
Office Visit with Nahum Corona MD  
[facility] INTERNAL MEDICINE OF [facility]) Appt Note: 1 mo f/u  
 [address] [facility], [address] [place] [address]  
  
   
 [address] [facility], [address] [place] [address] 5/15/2018  3:30 PM  
Follow Up with Wesley Bailey MD  
[facility]) Appt Note: 3mo fu  
 [address] [facility] [address] [phone]  
  
   
 [address] [place] [address] Upcoming Health Maintenance Date Due Hepatitis C Screening 1955 ZOSTER VACCINE AGE 60> 2/13/2013 DTaP/Tdap/Td series (2 - Td) 12/6/2024 COLONOSCOPY 5/27/2026 Allergies as of 4/4/2018  Review Complete On: 4/4/2018 By: Netta Montgomery MD  
  
 Severity Noted Reaction Type Reactions Augmentin [Amoxicillin-pot Clavulanate]    Itching Hibiclens [Chlorhexidine Gluconate]  03/16/2018    Itching Penicillins    Rash Current Immunizations  Reviewed on 3/16/2018 Name Date Tdap 12/6/2014 Not reviewed this visit You Were Diagnosed With   
  
 Codes Comments Leg edema, left    -  Primary ICD-10-CM: R60.0 ICD-9-CM: 699. 3 Vitals BP Pulse Temp Resp Height(growth percentile) 126/66 (BP 1 Location: Right arm, BP Patient Position: Sitting) 85 98.9 °F (37.2 °C) (Tympanic) 18 5' 10\" (1.778 m) Weight(growth percentile) SpO2 BMI Smoking Status 221 lb (100.2 kg) 95% 31.71 kg/m2 Never Smoker BMI and BSA Data Body Mass Index Body Surface Area 31.71 kg/m 2 2.22 m 2 Preferred Pharmacy Pharmacy Name Phone [facility] [address] [place] [address] Your Updated Medication List  
  
   
This list is accurate as of 4/4/18  8:57 AM.  Always use your most recent med list.  
  
  
  
  
 acetaminophen 500 mg tablet Commonly known as:  TYLENOL Take 1,000 mg by mouth every six (6) hours as needed for Pain. azelastine 137 mcg (0.1 %) nasal spray Commonly known as:  ASTELIN  
1 spray up each nostril twice per day  
  
 butalbital-acetaminophen-caff -40 mg per capsule Commonly known as:  [employer] Take 2 capsules every 8 hours as needed for headache  
  
 celecoxib 200 mg capsule Commonly known as:  CELEBREX Take 1 Cap by mouth two (2) times a day for 90 days. docusate sodium 50 mg capsule Commonly known as:  Gwendel Laser  
 Take 1 Cap by mouth two (2) times a day for 90 days. labetalol 100 mg tablet Commonly known as:  NORMODYNE  
TAKE ONE TABLET BY MOUTH TWICE DAILY  
  
 lansoprazole 30 mg capsule Commonly known as:  PREVACID TAKE 1 CAPSULE DAILY BEFOREBREAKFAST  
  
 lisinopril-hydroCHLOROthiazide 20-12.5 mg per tablet Commonly known as:  PRINZIDE, ZESTORETIC  
TAKE 1 TABLET BY MOUTH DAILY  
  
 metaxalone 800 mg tablet Commonly known as:  SKELAXIN Take 1 Tab by mouth three (3) times daily. Indications: Muscle Spasm  
  
 montelukast 10 mg tablet Commonly known as:  SINGULAIR  
TAKE 1 TABLET BY MOUTH EVERY DAY  
  
 nystatin topical cream  
Commonly known as:  MYCOSTATIN Apply  to affected area two (2) times a day. oxyCODONE IR 15 mg immediate release tablet Commonly known as:  Herny Lauren Take 1 Tab by mouth every six (6) hours as needed for Pain. Max Daily Amount: 60 mg.  
  
 oxyCODONE-acetaminophen  mg per tablet Commonly known as:  PERCOCET Take 1-2 Tabs by mouth every six (6) hours as needed for Pain. Max Daily Amount: 8 Tabs. MCALLISTER' COLON HEALTH 1.5 billion cell Cap Generic drug:  L. gasseri-B. bifidum-B longum Take  by mouth daily. predniSONE 20 mg tablet Commonly known as:  DELTASONE  
2 tabs daily x 2 days, 1 tab daily x 2 days, 1/2 tab daily x 4 days  
  
 raNITIdine 150 mg tablet Commonly known as:  ZANTAC TK 1 T PO HS  
  
 red yeast rice extract 600 mg Cap Take 1,200 mg by mouth daily. * warfarin 5 mg tablet Commonly known as:  COUMADIN  
TAKE 2 AND 1/2 TABLETS BY MOUTH DAILY OR AS DIRECTED * warfarin 3 mg tablet Commonly known as:  COUMADIN Take 1 Tab by mouth daily for 21 days. * Notice: This list has 2 medication(s) that are the same as other medications prescribed for you. Read the directions carefully, and ask your doctor or other care provider to review them with you. Follow-up Instructions Return in about 5 days (around 4/9/2018). To-Do List   
 04/04/2018 To Be Determined Appointment with Polo Picket at [address] [facility] REG MED CTR  
  
 04/04/2018 Lab:  C REACTIVE PROTEIN, QT   
  
 04/04/2018 Imaging:  DUPLEX LOWER EXT VENOUS BILAT   
  
 04/04/2018 Lab:  METABOLIC PANEL, COMPREHENSIVE   
  
 04/04/2018 10:00 AM  
  Appointment with Polo Picket at 1220 [facility] SCHEDULING/INTAKE  
  
 04/05/2018 To Be Determined Appointment with Josie Aguillon RN at [facility] REG MED [facility]  
  
 04/06/2018 10:00 AM  
  Appointment with Polo Picket at [address] [facility] MED CTR  
  
 04/07/2018 To Be Determined Appointment with Polo Picket at [address] [place] [address] MED CTR  
  
 04/08/2018 To Be Determined Appointment with Polo Picket at [address] [facility] REG [facility]  
  
 04/09/2018 To Be Determined Appointment with Josie Aguillon RN at [address] [facility] REG MED [facility]  
  
 04/09/2018 To Be Determined Appointment with Polo Picket at [address] [facility] REG [facility]  
  
 04/10/2018 To Be Determined Appointment with Polo Picket at [facility]  
  
 04/11/2018 To Be Determined Appointment with Polo Picket at [facility]  
  
 04/12/2018 To Be Determined Appointment with Zurdo Lindsey at [address] [facility] REG MED [facility]  
  
 04/12/2018 To Be Determined Appointment with Josie Aguillon RN at [facility]  
  
 04/16/2018 To Be Determined Appointment with Josie Aguillon RN at [facility] MED [facility]  
  
 04/19/2018 To Be Determined Appointment with Norma Lorenzo RN at 12[address] [facility] MED CTR  
  
 08/04/2018 Lab:  CBC WITH AUTOMATED DIFF Introducing [facility]! [employer] introduces [employer] patient portal. Now you can access parts of your medical record, email your doctor's office, and request medication refills online. 1. In your internet browser, go to [email or web address] 2. Click on the First Time User? Click Here link in the Sign In box. You will see the New Member Sign Up page. 3. Enter your [employer] Access Code exactly as it appears below. You will not need to use this code after youve completed the sign-up process. If you do not sign up before the expiration date, you must request a new code. · [employer] Access Code: [email or web address] Expires: 5/17/2018 10:23 AM 
 
4. Enter the last four digits of your Social Security Number (xxxx) and Date of Birth (mm/dd/yyyy) as indicated and click Submit. You will be taken to the next sign-up page. 5. Create a [employer] ID. This will be your [employer] login ID and cannot be changed, so think of one that is secure and easy to remember. 6. Create a [employer] password. You can change your password at any time. 7. Enter your Password Reset Question and Answer. This can be used at a later time if you forget your password. 8. Enter your e-mail address. You will receive e-mail notification when new information is available in [address] 9. Click Sign Up. You can now view and download portions of your medical record. 10. Click the Download Summary menu link to download a portable copy of your medical information. If you have questions, please visit the Frequently Asked Questions section of the [employer] website. Remember, [employer] is NOT to be used for urgent needs. For medical emergencies, dial 911. Now available from your iPhone and Android! Please provide this summary of care documentation to your next provider. Your primary care clinician is listed as Isidoro Jack. If you have any questions after today's visit, please call [phone].

## 2018-04-05 ENCOUNTER — HOME CARE VISIT (OUTPATIENT)
Dept: SCHEDULING | Facility: HOME HEALTH | Age: 63
End: 2018-04-05
Payer: MEDICARE

## 2018-04-05 ENCOUNTER — TELEPHONE (OUTPATIENT)
Dept: ORTHOPEDIC SURGERY | Age: 63
End: 2018-04-05

## 2018-04-05 PROCEDURE — G0299 HHS/HOSPICE OF RN EA 15 MIN: HCPCS

## 2018-04-05 PROCEDURE — 3331090002 HH PPS REVENUE DEBIT

## 2018-04-05 PROCEDURE — 3331090001 HH PPS REVENUE CREDIT

## 2018-04-05 NOTE — TELEPHONE ENCOUNTER
Post op 3/27/18:    Per nurse Jermian Barcenas w/bshsi  hlth:  Reports dr Anita Weller for pt upped coumadin to 10mg last night and to begin 8mg tonight until seen by dr Jonelle Damian on Monday 4/9/18. Nurse Jermain Barcenas wants to know if it's ok for pt to be followed for PT/INR by pcp dr Lee July.     please advise and call nurse Karol Bailey at [phone]

## 2018-04-06 ENCOUNTER — HOME CARE VISIT (OUTPATIENT)
Dept: SCHEDULING | Facility: HOME HEALTH | Age: 63
End: 2018-04-06
Payer: MEDICARE

## 2018-04-06 DIAGNOSIS — G89.18 POST-OPERATIVE PAIN: ICD-10-CM

## 2018-04-06 PROCEDURE — 3331090001 HH PPS REVENUE CREDIT

## 2018-04-06 PROCEDURE — 3331090002 HH PPS REVENUE DEBIT

## 2018-04-06 PROCEDURE — G0157 HHC PT ASSISTANT EA 15: HCPCS

## 2018-04-06 RX ORDER — OXYCODONE HYDROCHLORIDE 15 MG/1
15 TABLET ORAL
Qty: 28 TAB | Refills: 0 | Status: SHIPPED | OUTPATIENT
Start: 2018-04-06 | End: 2018-05-30 | Stop reason: ALTCHOICE

## 2018-04-06 NOTE — TELEPHONE ENCOUNTER
Adam (raymond batista) is calling requesting a medication refill for her  for the oxyCODONE IR (ROXICODONE) 15 mg immediate release tablet [[id number]. Please advise her when ready for pick-up at the  [facility] at [phone].

## 2018-04-06 NOTE — TELEPHONE ENCOUNTER
Date of Surgery: 03/27/2018 Left TKA  Last Visit: 03/22/2018 with CARA Wren    Next Appointment: 04/12/2018 with CARA Wren   Previous Refill Encounters: 03/29/2018 per CARA Wren #[id number]    Requested Prescriptions     Pending Prescriptions Disp Refills    oxyCODONE IR (ROXICODONE) 15 mg immediate release tablet 28 Tab 0     Sig: Take 1 Tab by mouth every six (6) hours as needed for Pain. Max Daily Amount: 60 mg.

## 2018-04-06 NOTE — TELEPHONE ENCOUNTER
I spoke with John Rey to see if anyone had gotten back with her and she said yes and he was going to 3 x per week.

## 2018-04-07 VITALS
HEART RATE: 83 BPM | TEMPERATURE: 98.7 F | SYSTOLIC BLOOD PRESSURE: 120 MMHG | OXYGEN SATURATION: 91 % | DIASTOLIC BLOOD PRESSURE: 70 MMHG

## 2018-04-07 PROCEDURE — 3331090001 HH PPS REVENUE CREDIT

## 2018-04-07 PROCEDURE — 3331090002 HH PPS REVENUE DEBIT

## 2018-04-08 PROCEDURE — 3331090002 HH PPS REVENUE DEBIT

## 2018-04-08 PROCEDURE — 3331090001 HH PPS REVENUE CREDIT

## 2018-04-09 ENCOUNTER — HOME CARE VISIT (OUTPATIENT)
Dept: SCHEDULING | Facility: HOME HEALTH | Age: 63
End: 2018-04-09
Payer: MEDICARE

## 2018-04-09 ENCOUNTER — OFFICE VISIT (OUTPATIENT)
Dept: INTERNAL MEDICINE CLINIC | Age: 63
End: 2018-04-09

## 2018-04-09 VITALS
HEIGHT: 70 IN | DIASTOLIC BLOOD PRESSURE: 62 MMHG | HEART RATE: 95 BPM | RESPIRATION RATE: 18 BRPM | SYSTOLIC BLOOD PRESSURE: 118 MMHG | TEMPERATURE: 99 F | OXYGEN SATURATION: 93 % | BODY MASS INDEX: 31.5 KG/M2 | WEIGHT: 220 LBS

## 2018-04-09 DIAGNOSIS — R35.0 BENIGN PROSTATIC HYPERPLASIA WITH URINARY FREQUENCY: ICD-10-CM

## 2018-04-09 DIAGNOSIS — N40.1 BENIGN PROSTATIC HYPERPLASIA WITH URINARY FREQUENCY: ICD-10-CM

## 2018-04-09 DIAGNOSIS — M79.672 LEFT FOOT PAIN: Primary | ICD-10-CM

## 2018-04-09 DIAGNOSIS — Z86.718 PERSONAL HISTORY OF VENOUS THROMBOSIS AND EMBOLISM: ICD-10-CM

## 2018-04-09 LAB
INR BLD: NORMAL
PT POC: NORMAL SECONDS
VALID INTERNAL CONTROL?: YES

## 2018-04-09 PROCEDURE — G0157 HHC PT ASSISTANT EA 15: HCPCS

## 2018-04-09 PROCEDURE — 3331090002 HH PPS REVENUE DEBIT

## 2018-04-09 PROCEDURE — 3331090001 HH PPS REVENUE CREDIT

## 2018-04-09 RX ORDER — TAMSULOSIN HYDROCHLORIDE 0.4 MG/1
0.4 CAPSULE ORAL DAILY
Qty: 30 CAP | Refills: 2 | Status: SHIPPED | OUTPATIENT
Start: 2018-04-09 | End: 2018-07-30 | Stop reason: SDUPTHER

## 2018-04-09 RX ORDER — PREDNISONE 20 MG/1
TABLET ORAL
Qty: 20 TAB | Refills: 0 | Status: SHIPPED | OUTPATIENT
Start: 2018-04-09 | End: 2018-04-30 | Stop reason: ALTCHOICE

## 2018-04-09 NOTE — MR AVS SNAPSHOT
Christiano Blue 
 
 
 [address] [facility], [address] [facility] [address] 
[phone] Patient: Edi Lema MRN: [id number] NBO:9/71/1922 Visit Information Date & Time Provider Department Dept. Phone Encounter #  
 4/9/2018 11:00 AM Tory Oquendo MD [facility] INTERNAL MEDICINE OF [place] [address] [phone] [id number] Your Appointments 4/12/2018  9:00 AM  
POST OP with Patricia Frost, [address] [place] [address] and [facility] - Erika [address] [place] [address] [facility] Appt Note: LEFT TOTAL KNEE ARTHROPLASTY  
 [address] [facility], [address] [place] [address]  
[phone]  
  
   
 [address] [facility], [address] [place] [address]  
  
    
 4/30/2018  9:30 AM  
Office Visit with Tory Oquendo MD  
[facility] INTERNAL MEDICINE OF [facility] Appt Note: 1 mo f/u  
 [address] [facility] [address] [facility] [address]  
  
   
 [address] [facility], [address] [facility] [address] 5/15/2018  3:30 PM  
Follow Up with Peggy Dey MD  
[facility]) Appt Note: 3mo fu  
 [address] [facility] [address]  
[phone]  
  
   
 [address] [facility] [address] Upcoming Health Maintenance Date Due Hepatitis C Screening 1955 ZOSTER VACCINE AGE 60> 2/13/2013 DTaP/Tdap/Td series (2 - Td) 12/6/2024 COLONOSCOPY 5/27/2026 Allergies as of 4/9/2018  Review Complete On: 4/9/2018 By: Little Heredia LPN Severity Noted Reaction Type Reactions Augmentin [Amoxicillin-pot Clavulanate]    Itching Hibiclens [Chlorhexidine Gluconate]  03/16/2018    Itching Penicillins    Rash Current Immunizations  Reviewed on 3/16/2018 Name Date Tdap 12/6/2014 Not reviewed this visit You Were Diagnosed With   
  
 Codes Comments Left foot pain    -  Primary ICD-10-CM: H26.342 ICD-9-CM: 729.5 Personal history of venous thrombosis and embolism     ICD-10-CM: Z86.718 ICD-9-CM: V12.51 Vitals BP Pulse Temp Resp Height(growth percentile) Weight(growth percentile)  
 118/62 (BP 1 Location: Right arm, BP Patient Position: Sitting) 95 99 °F (37.2 °C) (Tympanic) 18 5' 10\" (1.778 m) 220 lb (99.8 kg) SpO2 BMI Smoking Status 93% 31.57 kg/m2 Never Smoker BMI and BSA Data Body Mass Index Body Surface Area  
 31.57 kg/m 2 2.22 m 2 Preferred Pharmacy Pharmacy Name Phone [facility] [place] [address] [place] [phone] Your Updated Medication List  
  
   
This list is accurate as of 4/9/18 12:22 PM.  Always use your most recent med list.  
  
  
  
  
 acetaminophen 500 mg tablet Commonly known as:  TYLENOL Take 1,000 mg by mouth every six (6) hours as needed for Pain. azelastine 137 mcg (0.1 %) nasal spray Commonly known as:  ASTELIN  
1 spray up each nostril twice per day  
  
 butalbital-acetaminophen-caff -40 mg per capsule Commonly known as:  [employer] Take 2 capsules every 8 hours as needed for headache  
  
 celecoxib 200 mg capsule Commonly known as:  CELEBREX Take 1 Cap by mouth two (2) times a day for 90 days. docusate sodium 50 mg capsule Commonly known as:  Malone Raker Take 1 Cap by mouth two (2) times a day for 90 days. labetalol 100 mg tablet Commonly known as:  NORMODYNE  
TAKE ONE TABLET BY MOUTH TWICE DAILY  
  
 lansoprazole 30 mg capsule Commonly known as:  PREVACID TAKE 1 CAPSULE DAILY BEFOREBREAKFAST  
  
 lisinopril-hydroCHLOROthiazide 20-12.5 mg per tablet Commonly known as:  PRINZIDE, ZESTORETIC  
TAKE 1 TABLET BY MOUTH DAILY  
  
 metaxalone 800 mg tablet Commonly known as:  SKELAXIN Take 1 Tab by mouth three (3) times daily. Indications: Muscle Spasm  
  
 montelukast 10 mg tablet Commonly known as:  SINGULAIR  
 TAKE 1 TABLET BY MOUTH EVERY DAY  
  
 nystatin topical cream  
Commonly known as:  MYCOSTATIN Apply  to affected area two (2) times a day. oxyCODONE IR 15 mg immediate release tablet Commonly known as:  Henry Lauren Take 1 Tab by mouth every six (6) hours as needed for Pain. Max Daily Amount: 60 mg.  
  
 oxyCODONE-acetaminophen  mg per tablet Commonly known as:  PERCOCET Take 1-2 Tabs by mouth every six (6) hours as needed for Pain. Max Daily Amount: 8 Tabs. MCALLISTER' COLON HEALTH 1.5 billion cell Cap Generic drug:  L. gasseri-B. bifidum-B longum Take  by mouth daily. predniSONE 20 mg tablet Commonly known as:  DELTASONE  
3 tabs x 2 days, 2 tabs x 3 days, 1 tab x 4 days, 1/2 tab x 5 days  
  
 raNITIdine 150 mg tablet Commonly known as:  ZANTAC TK 1 T PO HS  
  
 red yeast rice extract 600 mg Cap Take 1,200 mg by mouth daily. tamsulosin 0.4 mg capsule Commonly known as:  FLOMAX Take 1 Cap by mouth daily. * warfarin 5 mg tablet Commonly known as:  COUMADIN  
TAKE 2 AND 1/2 TABLETS BY MOUTH DAILY OR AS DIRECTED * warfarin 3 mg tablet Commonly known as:  COUMADIN Take 1 Tab by mouth daily for 21 days. * Notice: This list has 2 medication(s) that are the same as other medications prescribed for you. Read the directions carefully, and ask your doctor or other care provider to review them with you. Prescriptions Sent to Pharmacy Refills  
 predniSONE (DELTASONE) 20 mg tablet 0 Sig: 3 tabs x 2 days, 2 tabs x 3 days, 1 tab x 4 days, 1/2 tab x 5 days Class: Normal  
 Pharmacy: [facility] [id number] Ph #: [phone]  
 tamsulosin (FLOMAX) 0.4 mg capsule 2 Sig: Take 1 Cap by mouth daily. Class: Normal  
 [facility] [address] [place] [address] Ph #: [phone]  Route: Oral  
  
 We Performed the Following AMB POC PT/INR [597[address] CPT(R)] To-Do List   
 04/09/2018 Imaging:  XR FOOT LT MIN 3 V   
  
 04/09/2018 4:00 PM  
  Appointment with Jonathan Jesus at [address] [place] [address] SCHEDULING/INTAKE  
  
 04/10/2018 To Be Determined Appointment with Jonathan Jesus at [address] [place] [address] SCHEDULING/INTAKE  
  
 04/11/2018 10:00 AM  
  Appointment with Jonathan Jesus at [address] [place] [address] SCHEDULING/INTAKE  
  
 04/12/2018 To Be Determined Appointment with Brian Bobby RN at [address] [place] [address] REG MED CTR  
  
 04/13/2018 To Be Determined Appointment with Micheal Mae at [address] [place] [address] REG MED CTR  
  
 04/16/2018 To Be Determined Appointment with Brian Bobby RN at [address] [place] [address] REG MED CTR  
  
 04/19/2018 To Be Determined Appointment with Brian Bobby RN at [address] [facility] [address] Introducing [address]! [address] [facility] introduces [employer] patient portal. Now you can access parts of your medical record, email your doctor's office, and request medication refills online. 1. In your internet browser, go to [email or web address] 2. Click on the First Time User? Click Here link in the Sign In box. You will see the New Member Sign Up page. 3. Enter your [employer] Access Code exactly as it appears below. You will not need to use this code after youve completed the sign-up process. If you do not sign up before the expiration date, you must request a new code. · [employer] Access Code: [email or web address] Expires: 5/17/2018 10:23 AM 
 
4. Enter the last four digits of your Social Security Number (xxxx) and Date of Birth (mm/dd/yyyy) as indicated and click Submit. You will be taken to the next sign-up page. 5. Create a [employer] ID.  This will be your [employer] login ID and cannot be changed, so think of one that is secure and easy to remember. 6. Create a [employer] password. You can change your password at any time. 7. Enter your Password Reset Question and Answer. This can be used at a later time if you forget your password. 8. Enter your e-mail address. You will receive e-mail notification when new information is available in [address] 9. Click Sign Up. You can now view and download portions of your medical record. 10. Click the Download Summary menu link to download a portable copy of your medical information. If you have questions, please visit the Frequently Asked Questions section of the [employer] website. Remember, [employer] is NOT to be used for urgent needs. For medical emergencies, dial 911. Now available from your iPhone and Android! Please provide this summary of care documentation to your next provider. Your primary care clinician is listed as Brielle Chavez. If you have any questions after today's visit, please call [phone].

## 2018-04-10 ENCOUNTER — TELEPHONE (OUTPATIENT)
Dept: INTERNAL MEDICINE CLINIC | Age: 63
End: 2018-04-10

## 2018-04-10 VITALS
RESPIRATION RATE: 16 BRPM | OXYGEN SATURATION: 97 % | DIASTOLIC BLOOD PRESSURE: 80 MMHG | HEART RATE: 82 BPM | SYSTOLIC BLOOD PRESSURE: 130 MMHG | TEMPERATURE: 98 F

## 2018-04-10 PROCEDURE — 3331090002 HH PPS REVENUE DEBIT

## 2018-04-10 PROCEDURE — 3331090001 HH PPS REVENUE CREDIT

## 2018-04-11 ENCOUNTER — HOME CARE VISIT (OUTPATIENT)
Dept: SCHEDULING | Facility: HOME HEALTH | Age: 63
End: 2018-04-11
Payer: MEDICARE

## 2018-04-11 VITALS
TEMPERATURE: 98.1 F | DIASTOLIC BLOOD PRESSURE: 60 MMHG | OXYGEN SATURATION: 97 % | SYSTOLIC BLOOD PRESSURE: 110 MMHG | HEART RATE: 85 BPM

## 2018-04-11 PROCEDURE — 3331090001 HH PPS REVENUE CREDIT

## 2018-04-11 PROCEDURE — G0157 HHC PT ASSISTANT EA 15: HCPCS

## 2018-04-11 PROCEDURE — 3331090002 HH PPS REVENUE DEBIT

## 2018-04-12 ENCOUNTER — HOME CARE VISIT (OUTPATIENT)
Dept: HOME HEALTH SERVICES | Facility: HOME HEALTH | Age: 63
End: 2018-04-12
Payer: COMMERCIAL

## 2018-04-12 ENCOUNTER — TELEPHONE (OUTPATIENT)
Dept: INTERNAL MEDICINE CLINIC | Age: 63
End: 2018-04-12

## 2018-04-12 ENCOUNTER — HOME CARE VISIT (OUTPATIENT)
Dept: SCHEDULING | Facility: HOME HEALTH | Age: 63
End: 2018-04-12
Payer: MEDICARE

## 2018-04-12 ENCOUNTER — OFFICE VISIT (OUTPATIENT)
Dept: ORTHOPEDIC SURGERY | Facility: CLINIC | Age: 63
End: 2018-04-12

## 2018-04-12 VITALS
BODY MASS INDEX: 31.75 KG/M2 | RESPIRATION RATE: 16 BRPM | TEMPERATURE: 95.9 F | SYSTOLIC BLOOD PRESSURE: 125 MMHG | OXYGEN SATURATION: 97 % | WEIGHT: 221.8 LBS | HEIGHT: 70 IN | DIASTOLIC BLOOD PRESSURE: 73 MMHG | HEART RATE: 83 BPM

## 2018-04-12 DIAGNOSIS — G89.18 POST-OP PAIN: Primary | ICD-10-CM

## 2018-04-12 DIAGNOSIS — Z96.652 S/P TOTAL KNEE REPLACEMENT, LEFT: ICD-10-CM

## 2018-04-12 PROCEDURE — G0299 HHS/HOSPICE OF RN EA 15 MIN: HCPCS

## 2018-04-12 PROCEDURE — 3331090002 HH PPS REVENUE DEBIT

## 2018-04-12 PROCEDURE — 3331090001 HH PPS REVENUE CREDIT

## 2018-04-12 RX ORDER — OXYCODONE HYDROCHLORIDE 5 MG/1
10 TABLET ORAL
Qty: 40 TAB | Refills: 0 | Status: SHIPPED | OUTPATIENT
Start: 2018-04-12 | End: 2018-04-30 | Stop reason: ALTCHOICE

## 2018-04-12 NOTE — TELEPHONE ENCOUNTER
Patients wife called and stated that the swelling in his foot has gone a little. He saw Dr Tho Levy today who doesn't think the prednisone is doing any good. Dr Tho Levy is tapering him off of that. Dr Tho Levy would like for Dr Kitty Pickens to give him a fluid pill that he can take for a few days. The patient would like to know if the fluid pill would interfere with the Flomax he is currently taking that is working for him. Please advise at [phone].

## 2018-04-12 NOTE — TELEPHONE ENCOUNTER
Shelbi with [employer] called in a 1.9 INR on patient please call her back at [phone].

## 2018-04-12 NOTE — PROGRESS NOTES
Patient: Naseem Castanon  YOB: 1955       HISTORY:  The patient presents for reevaluation of his s/p left total knee arthroplasty on 3/27/18. Patient is improved, states pain is a 5 out of 10.  he has participated in H/H physical therapy. has been doing knee exercises at home. He is still having some lower leg swelling left worse then right. Patient denies any fever, chills, chest pain, shortness of breath or calf pain. There are no new illness or injuries to report since last seen in the office. PHYSICAL EXAMINATION:    Visit Vitals    /73    Pulse 83    Temp 95.9 °F (35.5 °C) (Oral)    Resp 16    Ht 5' 10\" (1.778 m)    Wt 221 lb 12.8 oz (100.6 kg)    SpO2 97%    BMI 31.82 kg/m2     The patient is a well-developed, well-nourished male in no acute distress. The patient is alert and oriented times three. The patient appears to be well groomed. Mood and affect are normal.   ORTHOPEDIC EXAM of Left knee: Inspection: Effusion present,  incisions clean, dry intact, staples in place, swelling Left foot and lower leg  TTP: left foot bc of swelling, more tightness  Range of motion: 0-95 flexion  Stability: Stable   Strength: 5/5  2+ distal pulses      IMPRESSION:  Status post Left total knee arthroplasty. PLAN:  Incisions cleaned. Staples removed and steri strips applied  Patient is weight bearing as tolerated. OK to transition from walker to cane. Will set up outpt physical therapy. He had a doppler study done which was neg for DVT. He has a history of lower leg edema. He will continue to elevate and ice. Wear his compression stockings. Also recommended possibly having his PCP increase his fluid pill for a couple days to help with the swelling. Patient given a refill of pain medication. Roxicodone 10 mg Patient given pain medication for short term acute pain relief.  Goal is to treat patient according to above plan and to ultimately have patient off all pain medications once appropriate. If chronic pain management is required beyond what is expected for current orthopedic problem, will refer patient to pain management.  was reviewed and will be reviewed with every medication refill request.   Patient will follow up in 1 week.  To check on swelling    Orvan ESPERANZA [facility] [address] [facility]

## 2018-04-13 ENCOUNTER — HOME CARE VISIT (OUTPATIENT)
Dept: SCHEDULING | Facility: HOME HEALTH | Age: 63
End: 2018-04-13
Payer: COMMERCIAL

## 2018-04-13 PROCEDURE — 3331090002 HH PPS REVENUE DEBIT

## 2018-04-13 PROCEDURE — 3331090003 HH PPS REVENUE ADJ

## 2018-04-13 PROCEDURE — G0151 HHCP-SERV OF PT,EA 15 MIN: HCPCS

## 2018-04-13 PROCEDURE — 3331090001 HH PPS REVENUE CREDIT

## 2018-04-13 RX ORDER — FUROSEMIDE 20 MG/1
TABLET ORAL
Qty: 15 TAB | Refills: 0 | Status: CANCELLED | OUTPATIENT
Start: 2018-04-13

## 2018-04-13 RX ORDER — FUROSEMIDE 20 MG/1
TABLET ORAL
Qty: 15 TAB | Refills: 0 | Status: SHIPPED | OUTPATIENT
Start: 2018-04-13 | End: 2018-04-30 | Stop reason: SDUPTHER

## 2018-04-13 NOTE — TELEPHONE ENCOUNTER
Dr Manuelito Mcdonough advised and will be sending in Lasix 20 mg to be taken one tablet every other day     Patient called and verbalized understanding

## 2018-04-13 NOTE — PROGRESS NOTES
The patient presents to the office today with the chief complaint of left foot pain    HPI    The patient continues with swelling of her left foot with pain in his left foot. There is mild swelling in her left leg. The previous CT shows swelling her right groin - negative otherwise      Review of Systems   Respiratory: Negative for shortness of breath. Cardiovascular: Positive for leg swelling. Musculoskeletal: Positive for joint pain. Allergies   Allergen Reactions    Augmentin [Amoxicillin-Pot Clavulanate] Itching    Hibiclens [Chlorhexidine Gluconate] Itching    Penicillins Rash       Current Outpatient Prescriptions   Medication Sig Dispense Refill    predniSONE (DELTASONE) 20 mg tablet 3 tabs x 2 days, 2 tabs x 3 days, 1 tab x 4 days, 1/2 tab x 5 days 20 Tab 0    tamsulosin (FLOMAX) 0.4 mg capsule Take 1 Cap by mouth daily. 30 Cap 2    oxyCODONE IR (ROXICODONE) 15 mg immediate release tablet Take 1 Tab by mouth every six (6) hours as needed for Pain. Max Daily Amount: 60 mg. 28 Tab 0    docusate sodium (COLACE) 50 mg capsule Take 1 Cap by mouth two (2) times a day for 90 days. 60 Cap 2    celecoxib (CELEBREX) 200 mg capsule Take 1 Cap by mouth two (2) times a day for 90 days. 60 Cap 2    warfarin (COUMADIN) 3 mg tablet Take 1 Tab by mouth daily for 21 days. (Patient taking differently: Take 1 Tab by mouth two (2) days a week. TAKE COUMADIN 3 MG TONIGHT ALTERNATING WITH COUMADIN 5 MG. CHECK PROTIME ON THURSDAY 4/5/18) 30 Tab 0    lisinopril-hydroCHLOROthiazide (PRINZIDE, ZESTORETIC) 20-12.5 mg per tablet TAKE 1 TABLET BY MOUTH DAILY 90 Tab 0    labetalol (NORMODYNE) 100 mg tablet TAKE ONE TABLET BY MOUTH TWICE DAILY 180 Tab 0    L. gasseri-B. bifidum-B longum ([facility]) 1.5 billion cell cap Take  by mouth daily.       azelastine (ASTELIN) 137 mcg (0.1 %) nasal spray 1 spray up each nostril twice per day 1 Bottle 1    warfarin (COUMADIN) 5 mg tablet TAKE 2 AND 1/2 TABLETS BY MOUTH DAILY OR AS DIRECTED (Patient taking differently: TAKE COUMADIN 10 MG ON MON WED AND FRIDAY. ) 75 Tab 0    metaxalone (SKELAXIN) 800 mg tablet Take 1 Tab by mouth three (3) times daily. Indications: Muscle Spasm (Patient taking differently: Take 800 mg by mouth three (3) times daily as needed. Indications: Muscle Spasm) 90 Tab 2    montelukast (SINGULAIR) 10 mg tablet TAKE 1 TABLET BY MOUTH EVERY DAY (Patient taking differently: TAKE 1 TABLET BY MOUTH EVERY HS) 90 Tab 0    butalbital-acetaminophen-caff (FIORICET) -40 mg per capsule Take 2 capsules every 8 hours as needed for headache 540 Cap 3    nystatin (MYCOSTATIN) topical cream Apply  to affected area two (2) times a day. (Patient taking differently: Apply  to affected area two (2) times daily as needed.) 15 g 0    lansoprazole (PREVACID) 30 mg capsule TAKE 1 CAPSULE DAILY BEFOREBREAKFAST 90 Cap 3    raNITIdine (ZANTAC) 150 mg tablet TK 1 T PO HS  5    acetaminophen (TYLENOL) 500 mg tablet Take 1,000 mg by mouth every six (6) hours as needed for Pain.  oxyCODONE IR (ROXICODONE) 5 mg immediate release tablet Take 2 Tabs by mouth every six (6) hours as needed.  Max Daily Amount: 40 mg. 40 Tab 0       Past Medical History:   Diagnosis Date    Arthritis     Bleeding     Blood clot in the legs     Chronic pain     knee and shoulder    Esophageal reflux     GERD (gastroesophageal reflux disease)     Headache(784.0)     migraine    High cholesterol     Hypertension     Lower back pain 11/6/2010    Other chest pain     Personal history of venous thrombosis and embolism     Pure hypercholesterolemia     Right buttock pain 11/6/2010    Right foot pain     Rotator cuff tear     left-since 2010, worsened tear Jan.    Spinal stenosis     Tendonitis, tibialis     anterior    Thromboembolus ([address]     3 after sx last one 2000       Past Surgical History:   Procedure Laterality Date    FOOT/TOES SURGERY PROC UNLISTED      HX BACK SURGERY      HX ORTHOPAEDIC  06-25-12    Right foot with excision of bursa and adipose tissue from fifth metatarsal base by Dr. Brigette Dove      lower back (1992 and 2000)    HX OTHER SURGICAL      left foot (2008)    LAMINOTOMY      NERVE BLOCK         Social History     Social History    Marital status:      Spouse name: N/A    Number of children: N/A    Years of education: N/A     Occupational History    Not on file. Social History Main Topics    Smoking status: Never Smoker    Smokeless tobacco: Never Used    Alcohol use No    Drug use: No    Sexual activity: Not Currently     Other Topics Concern    Not on file     Social History Narrative       Patient does not have an advanced directive on file    Visit Vitals    /62 (BP 1 Location: Right arm, BP Patient Position: Sitting)    Pulse 95    Temp 99 °F (37.2 °C) (Tympanic)    Resp 18    Ht 5' 10\" (1.778 m)    Wt 220 lb (99.8 kg)    SpO2 93%    BMI 31.57 kg/m2       Physical Exam   Cardiovascular: Normal rate and regular rhythm. Exam reveals no gallop. No murmur heard. Pulmonary/Chest: He has no wheezes. He has no rales. Musculoskeletal: He exhibits edema (1+ woody edema left leg with 2+ edema of the foot).        Hospital Outpatient Visit on 04/04/2018   Component Date Value Ref Range Status    C-Reactive protein 04/04/2018 6.1* 0 - 0.3 mg/dL Final    WBC 04/04/2018 6.4  4.6 - 13.2 K/uL Final    RBC 04/04/2018 3.42* 4.70 - 5.50 M/uL Final    HGB 04/04/2018 10.8* 13.0 - 16.0 g/dL Final    HCT 04/04/2018 33.2* 36.0 - 48.0 % Final    MCV 04/04/2018 97.1* 74.0 - 97.0 FL Final    MCH 04/04/2018 31.6  24.0 - 34.0 PG Final    MCHC 04/04/2018 32.5  31.0 - 37.0 g/dL Final    RDW 04/04/2018 12.3  11.6 - 14.5 % Final    PLATELET 54/34/7224 711  135 - 420 K/uL Final    MPV 04/04/2018 9.6  9.2 - 11.8 FL Final    NEUTROPHILS 04/04/2018 66  40 - 73 % Final    LYMPHOCYTES 04/04/2018 15* 21 - 52 % Final  MONOCYTES 04/04/2018 12* 3 - 10 % Final    EOSINOPHILS 04/04/2018 6* 0 - 5 % Final    BASOPHILS 04/04/2018 1  0 - 2 % Final    ABS. NEUTROPHILS 04/04/2018 4.3  1.8 - 8.0 K/UL Final    ABS. LYMPHOCYTES 04/04/2018 0.9  0.9 - 3.6 K/UL Final    ABS. MONOCYTES 04/04/2018 0.8  0.05 - 1.2 K/UL Final    ABS. EOSINOPHILS 04/04/2018 0.4  0.0 - 0.4 K/UL Final    ABS. BASOPHILS 04/04/2018 0.0  0.0 - 0.06 K/UL Final    DF 04/04/2018 AUTOMATED    Final    Sodium 04/04/2018 140  136 - 145 mmol/L Final    Potassium 04/04/2018 4.0  3.5 - 5.5 mmol/L Final    Chloride 04/04/2018 103  100 - 108 mmol/L Final    CO2 04/04/2018 30  21 - 32 mmol/L Final    Anion gap 04/04/2018 7  3.0 - 18 mmol/L Final    Glucose 04/04/2018 127* 74 - 99 mg/dL Final    BUN 04/04/2018 14  7.0 - 18 MG/DL Final    Creatinine 04/04/2018 0.87  0.6 - 1.3 MG/DL Final    BUN/Creatinine ratio 04/04/2018 16  12 - 20   Final    GFR est AA 04/04/2018 >60  >60 ml/min/1.73m2 Final    GFR est non-AA 04/04/2018 >60  >60 ml/min/1.73m2 Final    Comment: (NOTE)  Estimated GFR is calculated using the Modification of Diet in Renal   Disease (MDRD) Study equation, reported for both  Americans   (GFRAA) and non- Americans (GFRNA), and normalized to 1.73m2   body surface area. The physician must decide which value applies to   the patient. The MDRD study equation should only be used in   individuals age 25 or older. It has not been validated for the   following: pregnant women, patients with serious comorbid conditions,   or on certain medications, or persons with extremes of body size,   muscle mass, or nutritional status.  Calcium 04/04/2018 9.2  8.5 - 10.1 MG/DL Final    Bilirubin, total 04/04/2018 0.3  0.2 - 1.0 MG/DL Final    ALT (SGPT) 04/04/2018 30  16 - 61 U/L Final    AST (SGOT) 04/04/2018 21  15 - 37 U/L Final    Alk.  phosphatase 04/04/2018 52  45 - 117 U/L Final    Protein, total 04/04/2018 6.2* 6.4 - 8.2 g/dL Final    Albumin 04/04/2018 2.9* 3.4 - 5.0 g/dL Final    Globulin 04/04/2018 3.3  2.0 - 4.0 g/dL Final    A-G Ratio 04/04/2018 0.9  0.8 - 1.7   Final   Admission on 03/27/2018, Discharged on 03/28/2018   Component Date Value Ref Range Status    INR (POC) 03/27/2018 1.0  <1.2   Final    The intended use of the i-STAT PT/INR is for monitoring oral anticoagulant therapy and not for evaluating individual factor deficiencies.  WBC 03/28/2018 9.6  4.6 - 13.2 K/uL Final    RBC 03/28/2018 3.73* 4.70 - 5.50 M/uL Final    HGB 03/28/2018 11.7* 13.0 - 16.0 g/dL Final    This is a post-surgery specimen.  HCT 03/28/2018 35.6* 36.0 - 48.0 % Final    MCV 03/28/2018 95.4  74.0 - 97.0 FL Final    MCH 03/28/2018 31.4  24.0 - 34.0 PG Final    MCHC 03/28/2018 32.9  31.0 - 37.0 g/dL Final    RDW 03/28/2018 13.0  11.6 - 14.5 % Final    PLATELET 86/22/8096 508  135 - 420 K/uL Final    MPV 03/28/2018 10.7  9.2 - 11.8 FL Final    NEUTROPHILS 03/28/2018 73  40 - 73 % Final    LYMPHOCYTES 03/28/2018 14* 21 - 52 % Final    MONOCYTES 03/28/2018 13* 3 - 10 % Final    EOSINOPHILS 03/28/2018 0  0 - 5 % Final    BASOPHILS 03/28/2018 0  0 - 2 % Final    ABS. NEUTROPHILS 03/28/2018 7.0  1.8 - 8.0 K/UL Final    ABS. LYMPHOCYTES 03/28/2018 1.3  0.9 - 3.6 K/UL Final    ABS. MONOCYTES 03/28/2018 1.2  0.05 - 1.2 K/UL Final    ABS. EOSINOPHILS 03/28/2018 0.0  0.0 - 0.4 K/UL Final    ABS.  BASOPHILS 03/28/2018 0.0  0.0 - 0.1 K/UL Final    DF 03/28/2018 AUTOMATED    Final    Prothrombin time 03/28/2018 14.9  11.5 - 15.2 sec Final    INR 03/28/2018 1.2  0.8 - 1.2   Final    Comment:            INR Therapeutic Ranges         (on stable oral anticoagulant):     INDICATION                INR  DVT/PE/Atrial Fib          2.0-3.0  MI/Mechanical Heart Valve  2.5-3.5     Hospital Outpatient Visit on 03/22/2018   Component Date Value Ref Range Status    Color 03/22/2018 YELLOW    Final    Appearance 03/22/2018 CLEAR    Final    Specific gravity 03/22/2018 1.006  1.005 - 1.030   Final    pH (UA) 03/22/2018 6.5  5.0 - 8.0   Final    Protein 03/22/2018 NEGATIVE   NEG mg/dL Final    Glucose 03/22/2018 NEGATIVE   NEG mg/dL Final    Ketone 03/22/2018 NEGATIVE   NEG mg/dL Final    Bilirubin 03/22/2018 NEGATIVE   NEG   Final    Blood 03/22/2018 NEGATIVE   NEG   Final    Urobilinogen 03/22/2018 0.2  0.2 - 1.0 EU/dL Final    Nitrites 03/22/2018 NEGATIVE   NEG   Final    Leukocyte Esterase 03/22/2018 NEGATIVE   NEG   Final    Special Requests: 03/22/2018 NO SPECIAL REQUESTS    Final    Culture result: 03/22/2018 NO GROWTH 1 DAY    Final   Hospital Outpatient Visit on 03/16/2018   Component Date Value Ref Range Status    WBC 03/16/2018 8.5  4.6 - 13.2 K/uL Final    RBC 03/16/2018 4.48* 4.70 - 5.50 M/uL Final    HGB 03/16/2018 14.7  13.0 - 16.0 g/dL Final    HCT 03/16/2018 42.3  36.0 - 48.0 % Final    MCV 03/16/2018 94.4  74.0 - 97.0 FL Final    MCH 03/16/2018 32.8  24.0 - 34.0 PG Final    MCHC 03/16/2018 34.8  31.0 - 37.0 g/dL Final    RDW 03/16/2018 13.0  11.6 - 14.5 % Final    PLATELET 03/36/0275 074  135 - 420 K/uL Final    MPV 03/16/2018 11.3  9.2 - 11.8 FL Final    NEUTROPHILS 03/16/2018 71  40 - 73 % Final    LYMPHOCYTES 03/16/2018 17* 21 - 52 % Final    MONOCYTES 03/16/2018 11* 3 - 10 % Final    EOSINOPHILS 03/16/2018 1  0 - 5 % Final    BASOPHILS 03/16/2018 0  0 - 2 % Final    ABS. NEUTROPHILS 03/16/2018 6.0  1.8 - 8.0 K/UL Final    ABS. LYMPHOCYTES 03/16/2018 1.5  0.9 - 3.6 K/UL Final    ABS. MONOCYTES 03/16/2018 0.9  0.05 - 1.2 K/UL Final    ABS. EOSINOPHILS 03/16/2018 0.1  0.0 - 0.4 K/UL Final    ABS.  BASOPHILS 03/16/2018 0.0  0.0 - 0.06 K/UL Final    DF 03/16/2018 AUTOMATED    Final    Prothrombin time 03/16/2018 23.5* 11.5 - 15.2 sec Final    INR 03/16/2018 2.2* 0.8 - 1.2   Final    Comment:            INR Therapeutic Ranges         (on stable oral anticoagulant):     INDICATION                INR  DVT/PE/Atrial Fib 2.0-3.0  MI/Mechanical Heart Valve  2.5-3.5      aPTT 03/16/2018 32.2  23.0 - 36.4 SEC Final    Sodium 03/16/2018 142  136 - 145 mmol/L Final    Potassium 03/16/2018 4.0  3.5 - 5.5 mmol/L Final    Chloride 03/16/2018 104  100 - 108 mmol/L Final    CO2 03/16/2018 30  21 - 32 mmol/L Final    Anion gap 03/16/2018 8  3.0 - 18 mmol/L Final    Glucose 03/16/2018 126* 74 - 99 mg/dL Final    BUN 03/16/2018 16  7.0 - 18 MG/DL Final    Creatinine 03/16/2018 0.98  0.6 - 1.3 MG/DL Final    BUN/Creatinine ratio 03/16/2018 16  12 - 20   Final    GFR est AA 03/16/2018 >60  >60 ml/min/1.73m2 Final    GFR est non-AA 03/16/2018 >60  >60 ml/min/1.73m2 Final    Comment: (NOTE)  Estimated GFR is calculated using the Modification of Diet in Renal   Disease (MDRD) Study equation, reported for both  Americans   (GFRAA) and non- Americans (GFRNA), and normalized to 1.73m2   body surface area. The physician must decide which value applies to   the patient. The MDRD study equation should only be used in   individuals age 25 or older. It has not been validated for the   following: pregnant women, patients with serious comorbid conditions,   or on certain medications, or persons with extremes of body size,   muscle mass, or nutritional status.       Calcium 03/16/2018 9.1  8.5 - 10.1 MG/DL Final    Crossmatch Expiration 03/16/2018 03/30/2018   Final    ABO/Rh(D) 03/16/2018 B POSITIVE   Final    Antibody screen 03/16/2018 NEG   Final    Color 03/16/2018 YELLOW    Final    Appearance 03/16/2018 CLEAR    Final    Specific gravity 03/16/2018 1.015  1.005 - 1.030   Final    pH (UA) 03/16/2018 6.5  5.0 - 8.0   Final    Protein 03/16/2018 NEGATIVE   NEG mg/dL Final    Glucose 03/16/2018 NEGATIVE   NEG mg/dL Final    Ketone 03/16/2018 NEGATIVE   NEG mg/dL Final    Bilirubin 03/16/2018 NEGATIVE   NEG   Final    Blood 03/16/2018 NEGATIVE   NEG   Final    Urobilinogen 03/16/2018 0.2  0.2 - 1.0 EU/dL Final    Nitrites 03/16/2018 NEGATIVE   NEG   Final    Leukocyte Esterase 03/16/2018 TRACE* NEG   Final    Special Requests: 03/16/2018 NO SPECIAL REQUESTS    Final    Culture result: 03/16/2018 NO GROWTH 1 DAY    Final    WBC 03/16/2018 0 to 2  0 - 4 /hpf Final    RBC 03/16/2018 NONE  0 - 5 /hpf Final    Epithelial cells 03/16/2018 FEW  0 - 5 /lpf Final    Bacteria 03/16/2018 NEGATIVE   NEG /hpf Final   Office Visit on 02/19/2018   Component Date Value Ref Range Status    VALID INTERNAL CONTROL POC 02/19/2018 Yes   Final    QuickVue Influenza test 02/19/2018 Positive  Negative Final    Influenza A    VALID INTERNAL CONTROL POC 02/19/2018 Yes   Final    QuickVue Influenza test 02/19/2018 Negative  Negative Final    Influenza B   Hospital Outpatient Visit on 01/31/2018   Component Date Value Ref Range Status    WBC 01/31/2018 7.3  4.6 - 13.2 K/uL Final    RBC 01/31/2018 4.42* 4.70 - 5.50 M/uL Final    HGB 01/31/2018 14.7  13.0 - 16.0 g/dL Final    HCT 01/31/2018 42.9  36.0 - 48.0 % Final    MCV 01/31/2018 97.1* 74.0 - 97.0 FL Final    MCH 01/31/2018 33.3  24.0 - 34.0 PG Final    MCHC 01/31/2018 34.3  31.0 - 37.0 g/dL Final    RDW 01/31/2018 12.5  11.6 - 14.5 % Final    PLATELET 18/18/4768 204  135 - 420 K/uL Final    MPV 01/31/2018 11.3  9.2 - 11.8 FL Final    NEUTROPHILS 01/31/2018 66  40 - 73 % Final    LYMPHOCYTES 01/31/2018 20* 21 - 52 % Final    MONOCYTES 01/31/2018 12* 3 - 10 % Final    EOSINOPHILS 01/31/2018 2  0 - 5 % Final    BASOPHILS 01/31/2018 0  0 - 2 % Final    ABS. NEUTROPHILS 01/31/2018 4.8  1.8 - 8.0 K/UL Final    ABS. LYMPHOCYTES 01/31/2018 1.4  0.9 - 3.6 K/UL Final    ABS. MONOCYTES 01/31/2018 0.9  0.05 - 1.2 K/UL Final    ABS. EOSINOPHILS 01/31/2018 0.2  0.0 - 0.4 K/UL Final    ABS.  BASOPHILS 01/31/2018 0.0  0.0 - 0.06 K/UL Final    DF 01/31/2018 AUTOMATED    Final    Prothrombin time 01/31/2018 17.0* 11.5 - 15.2 sec Final    INR 01/31/2018 1.4* 0.8 - 1.2   Final Comment:            INR Therapeutic Ranges         (on stable oral anticoagulant):     INDICATION                INR  DVT/PE/Atrial Fib          2.0-3.0  MI/Mechanical Heart Valve  2.5-3.5      aPTT 01/31/2018 31.3  23.0 - 36.4 SEC Final    Sodium 01/31/2018 140  136 - 145 mmol/L Final    Potassium 01/31/2018 4.1  3.5 - 5.5 mmol/L Final    Chloride 01/31/2018 103  100 - 108 mmol/L Final    CO2 01/31/2018 30  21 - 32 mmol/L Final    Anion gap 01/31/2018 7  3.0 - 18 mmol/L Final    Glucose 01/31/2018 89  74 - 99 mg/dL Final    BUN 01/31/2018 12  7.0 - 18 MG/DL Final    Creatinine 01/31/2018 0.92  0.6 - 1.3 MG/DL Final    BUN/Creatinine ratio 01/31/2018 13  12 - 20   Final    GFR est AA 01/31/2018 >60  >60 ml/min/1.73m2 Final    GFR est non-AA 01/31/2018 >60  >60 ml/min/1.73m2 Final    Comment: (NOTE)  Estimated GFR is calculated using the Modification of Diet in Renal   Disease (MDRD) Study equation, reported for both  Americans   (GFRAA) and non- Americans (GFRNA), and normalized to 1.73m2   body surface area. The physician must decide which value applies to   the patient. The MDRD study equation should only be used in   individuals age 25 or older. It has not been validated for the   following: pregnant women, patients with serious comorbid conditions,   or on certain medications, or persons with extremes of body size,   muscle mass, or nutritional status.       Calcium 01/31/2018 9.3  8.5 - 10.1 MG/DL Final    Albumin 01/31/2018 3.9  3.4 - 5.0 g/dL Final    Hemoglobin A1c 01/31/2018 5.8* 4.2 - 5.6 % Final    Comment: (NOTE)  HbA1C Interpretive Ranges  <5.7              Normal  5.7 - 6.4         Consider Prediabetes  >6.5              Consider Diabetes      Est. average glucose 01/31/2018 120  mg/dL Final    Comment: (NOTE)  The eAG should be interpreted with patient characteristics in mind   since ethnicity, interindividual differences, red cell lifespan,   variation in rates of glycation, etc. may affect the validity of the   calculation.       Crossmatch Expiration 01/31/2018 02/14/2018   Final    ABO/Rh(D) 01/31/2018 B POSITIVE   Final    Antibody screen 01/31/2018 NEG   Final    Color 01/31/2018 YELLOW    Final    Appearance 01/31/2018 CLEAR    Final    Specific gravity 01/31/2018 1.012  1.005 - 1.030   Final    pH (UA) 01/31/2018 7.0  5.0 - 8.0   Final    Protein 01/31/2018 NEGATIVE   NEG mg/dL Final    Glucose 01/31/2018 NEGATIVE   NEG mg/dL Final    Ketone 01/31/2018 NEGATIVE   NEG mg/dL Final    Bilirubin 01/31/2018 NEGATIVE   NEG   Final    Blood 01/31/2018 NEGATIVE   NEG   Final    Urobilinogen 01/31/2018 0.2  0.2 - 1.0 EU/dL Final    Nitrites 01/31/2018 NEGATIVE   NEG   Final    Leukocyte Esterase 01/31/2018 NEGATIVE   NEG   Final    Special Requests: 01/31/2018 NO SPECIAL REQUESTS    Final    Culture result: 01/31/2018 NO GROWTH 1 DAY    Final    Ventricular Rate 01/31/2018 62  BPM Final    Atrial Rate 01/31/2018 62  BPM Final    P-R Interval 01/31/2018 180  ms Final    QRS Duration 01/31/2018 86  ms Final    Q-T Interval 01/31/2018 394  ms Final    QTC Calculation (Bezet) 01/31/2018 399  ms Final    Calculated P Axis 01/31/2018 58  degrees Final    Calculated R Axis 01/31/2018 12  degrees Final    Calculated T Axis 01/31/2018 64  degrees Final    Diagnosis 01/31/2018    Final                    Value:Normal sinus rhythm  Possible Left atrial enlargement  Cannot exclude Inferior infarct , age undetermined  Nonspecific ST and T wave abnormality Lateral leads  Borderline ECG  When compared with ECG of 11-JUN-2012 12:04,  No significant change was found  Confirmed by Libra Foss ([id number]) on 2/1/2018 7:08:50 AM     Hospital Outpatient Visit on 01/24/2018   Component Date Value Ref Range Status    Creatinine, POC 01/24/2018 0.9  0.6 - 1.3 MG/DL Final    GFRAA, POC 01/24/2018 >60  >60 ml/min/1.73m2 Final    GFRNA, POC 01/24/2018 >60  >60 ml/min/1.73m2 Final    Comment: Estimated GFR is calculated using the IDMS-traceable Modification of Diet in Renal Disease (MDRD) Study equation, reported for both  Americans (GFRAA) and non- Americans (GFRNA), and normalized to 1.73m2 body surface area. The physician must decide which value applies to the patient. The MDRD study equation should only be used in individuals age 25 or older. It has not been validated for the following: pregnant women, patients with serious comorbid conditions, or on certain medications, or persons with extremes of body size, muscle mass, or nutritional status. Office Visit on 01/18/2018   Component Date Value Ref Range Status    VALID INTERNAL CONTROL POC 01/18/2018 Yes   Final    INR POC 01/18/2018 2.1   Final       .No results found for any visits on 04/09/18. Assessment / Plan      ICD-10-CM ICD-9-CM    1. Left foot pain M79.672 729.5 XR FOOT LT MIN 3 V   2. Personal history of venous thrombosis and embolism Z86.718 V12.51 AMB POC PT/INR   3. Benign prostatic hyperplasia with urinary frequency N40.1 600.01     R35.0 788.41        Xray foot   Protime done and Coumadin adjusted to 10 mg on Monday, Wednesday, Friday. 8 mg otherwise. Recheck protime in four days  he was advised to continue his maintenance medications      Follow-up Disposition:  Return in about 6 weeks (around 5/21/2018). I asked Stevie Gambino if he has any questions and I answered the questions.   Stevie Gambino states that he understands the treatment plan and agrees with the treatment plan

## 2018-04-14 VITALS
TEMPERATURE: 98.3 F | OXYGEN SATURATION: 98 % | DIASTOLIC BLOOD PRESSURE: 80 MMHG | HEART RATE: 80 BPM | SYSTOLIC BLOOD PRESSURE: 130 MMHG

## 2018-04-14 PROCEDURE — 3331090002 HH PPS REVENUE DEBIT

## 2018-04-14 PROCEDURE — 3331090001 HH PPS REVENUE CREDIT

## 2018-04-15 VITALS
HEART RATE: 98 BPM | RESPIRATION RATE: 16 BRPM | OXYGEN SATURATION: 96 % | DIASTOLIC BLOOD PRESSURE: 80 MMHG | SYSTOLIC BLOOD PRESSURE: 128 MMHG | TEMPERATURE: 98 F

## 2018-04-15 PROCEDURE — 3331090002 HH PPS REVENUE DEBIT

## 2018-04-15 PROCEDURE — 3331090001 HH PPS REVENUE CREDIT

## 2018-04-16 ENCOUNTER — HOSPITAL ENCOUNTER (OUTPATIENT)
Dept: PHYSICAL THERAPY | Age: 63
Discharge: HOME | End: 2018-04-16
Payer: COMMERCIAL

## 2018-04-16 PROCEDURE — 3331090001 HH PPS REVENUE CREDIT

## 2018-04-16 PROCEDURE — 97162 PT EVAL MOD COMPLEX 30 MIN: CPT

## 2018-04-16 PROCEDURE — 3331090002 HH PPS REVENUE DEBIT

## 2018-04-16 NOTE — PROGRESS NOTES
PT DAILY TREATMENT NOTE/KNEE EVAL 3-    Patient Name: Milagro Douglas  Date:2018  : 1955  [x]  Patient  Verified  Payor: BLUE CROSS / Plan:  [facility] [place] / Product Type: PPO /    In time:11:15  Out time:11:55  Total Treatment Time (min): 40 (15 min on phone with nurse practitioner)   Visit #: 1 of     Treatment Area: Pain in left knee [M25.562]  Other acute postprocedural pain [G89.18]    SUBJECTIVE  Pain Level (0-10 scale): 3-410  []constant []intermittent []improving []worsening []no change since onset    Any medication changes, allergies to medications, adverse drug reactions, diagnosis change, or new procedure performed?: [x] No    [] Yes (see summary sheet for update)  Subjective functional status/changes:     Pt is a 69 yo M who presents s/p L TKA. Subjective reports of pain increased with bending and with initial movements after prolonged positions. Pain is relieved with rest, elevation, and ice. No complications with surgery. Barriers: []pain []financial []time []transportation []other  Motivation: high  Substance use: []Alcohol []Tobacco []other:   FABQ Score: []low [x]elevate - based on FOTO  Cognition: A & O x 4    Other:    OBJECTIVE/EXAMINATION      20 min [x]Eval                  []Re-Eval       5 min Therapeutic Activity:  []  See flow sheet :   Rationale: Educated patient regarding findings of evaluation,sign/sx of infection/DVT, findings consistent with DVT, danger of DVT and importance of following up with MD, avoiding HEP until cleared by MD, plan to finish therapy evaluation next session.               With   [] TE   [] TA   [] neuro   [] other: Patient Education: [x] Review HEP    [] Progressed/Changed HEP based on:   [] positioning   [] body mechanics   [] transfers   [] heat/ice application    [] other:      Other Objective/Functional Measures:     /80 taken manually prior to therapy    Physical Therapy Evaluation - Knee    Posture: [] Varus [x] Valgus    [] Recurvatum        [] Tibial Torsion    [] Foot Supination    [x] Foot Pronation    Describe:    Gait:  [] Normal    [x] Abnormal    [] Antalgic    [] NWB    Device:    Describe:SPC held on right, decreased WS/stance time left LE, decreased terminal knee extension resulting in decreased heel strike on left, over-pronation on left during stance phase    ROM / Strength  Not assessed d/t findings consistent with DVT    Palpation:  TTP left calf    Optional Tests:    Girth Measurements:    Cm 5\" distal to patella   Left 41.0   Right  36.5                Other tests/comments:    Well's Clinical Prediction Rules  -Bedridden for more than 3 days because of surgery (within 4 weeks). (Points: 1 )   -Entire leg swollen. (Points: 1 )   -Unilateral calf swelling of greater than 3 cm (below tibial tuberosity). (Points: 1 )   -Unilateral pitting edema. (Points: 1 )   Total points: 4 POINTS  Analysis:  Category: High Risk (75%). Risk score interpretation: High probability of DVT. Pitting Edema: 3+ left calf on Pitting Edema Scale    Called MD's office and spoke to Nurse Practitioner regarding findings consistent with DVT. Nurse Practitioner Carlota Maier) would like for patient to follow up with her tomorrow, she did not want patient sent to ER. Kelsi Dickson spoke to patient and confirmed his appointment tomorrow. Hold PT until cleared of possible DVT per Nurse Practitioner. Pain Level (0-10 scale) post treatment: 3-4/10    ASSESSMENT/Changes in Function: See POC    Patient will continue to benefit from skilled PT services to modify and progress therapeutic interventions, address functional mobility deficits, address ROM deficits, address strength deficits, analyze and address soft tissue restrictions, analyze and cue movement patterns, assess and modify postural abnormalities, address imbalance/dizziness and instruct in home and community integration to attain remaining goals.      [x]  See Plan of Care  []  See progress note/recertification  []  See Discharge Summary         Progress towards goals / Updated goals:  See POC    PLAN  [x]  Upgrade activities as tolerated     []  Continue plan of care  [x]  Update interventions per flow sheet       []  Discharge due to:_  []  Other:_      Bharath Brennan, PT 4/16/2018  11:03 AM

## 2018-04-16 NOTE — PROGRESS NOTES
[facility] - [address]  [phone] fax    Plan of Care/ Statement of Necessity for Physical Therapy Services    Patient name: Rena Balbuena Start of Care: 2018   Referral source: Eliu Ramirez MD : 1955    Medical Diagnosis: Pain in left knee [M25.562]  Other acute postprocedural pain [G89.18]   Onset Date:3/27/18    Treatment Diagnosis: Left TKA   Prior Hospitalization: see medical history Provider#: [id number]   Medications: Verified on Patient summary List    Comorbidities: HTN, cervical/lumbar spinal stenosis with fusions, arthritis, hx of DVTs, torn left RTC - planning on surgery once left TKA is healed   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 Assessment/ key information: Pt is a 71 yo M who presents s/p L TKA. Subjective reports of pain increased with bending and with initial movements after prolonged positions. Pain is relieved with rest, elevation, and ice. Pt ambulates with SPC held on right, decreased WS/stance time left LE, decreased terminal knee extension resulting in decreased heel strike on left, and over-pronation on left during stance phase. Incision is healing without signs of complication with multiple steri strips intact; however, significant pitting edema is evident in left calf/foot. Pt presents with 4.5 cm of swelling 5\" distal to patella on left compared to right. Findings of recent surgery, entire leg swollen, unilateral calf swelling >3cm distal to tibial tuberosity, and unilateral pitting edema are indicative of high risk of DVT based on Well's Clinical Prediction Rules.   Spoke to Nurse Practitioner Albino Vela) who requests hold PT until MD follow up tomorrow. Will complete evaluation next visit pending pt is cleared to return to therapy. Once cleared, pt will benefit from skilled PT to address edema, strength, ROM, scar tissue mobility, joint mobility, flexibility, and functional mobility deficits for improved ease of ambulation and return to work. Addendum 4/20/18. DVT was ruled out and pt has returned to therapy. Left knee AROM is 0-88 dgrs. AAROM flexion increased to 102 dgrs during heel slides. Left knee strength is decreased (extension 3+/4, flexion 4/5), partially limited by pain. Evaluation Complexity History HIGH Complexity :3+ comorbidities / personal factors will impact the outcome/ POC ; Examination MEDIUM Complexity : 3 Standardized tests and measures addressing body structure, function, activity limitation and / or participation in recreation  ;Presentation HIGH Complexity : Unstable and unpredictable characteristics  ; Clinical Decision Making MEDIUM Complexity : FOTO score of 26-74  Overall Complexity Rating: MEDIUM  Problem List: pain affecting function, decrease ROM, decrease strength, edema affecting function, impaired gait/ balance, decrease ADL/ functional abilitiies, decrease activity tolerance, decrease flexibility/ joint mobility and decrease transfer abilities   Treatment Plan may include any combination of the following: Therapeutic exercise, Therapeutic activities, Neuromuscular re-education, Physical agent/modality, Gait/balance training, Manual therapy, Patient education, Self Care training, Functional mobility training, Home safety training and Stair training  Patient / Family readiness to learn indicated by: asking questions, trying to perform skills and interest  Persons(s) to be included in education: patient (P) and family support person (FSP);list wife  Barriers to Learning/Limitations: None  Patient Goal (s): to be able to go back to work, to be free of pain  Patient Self Reported Health Status: good  Rehabilitation Potential: good    Short Term Goals: To be accomplished in 1 weeks:  1. Pt will be compliant with initial HEP to improve therapy outcomes   Long Term Goals: To be accomplished in 6 weeks:  1. Pt will improve FOTO by 16 points in order to demonstrate functional improvement   2. Pt will improve left knee flexion AROM to 115 dgrs in order to improve gait pattern and ease of work tasks  3. Pt will improve left knee MMT to 4+/5 in order to improve ease of work tasks  4. Pt will improve standing/ambulatory tolerance to 15 minutes in order to improve ease of work tasks    Frequency / Duration: Patient to be seen 2-3 times per week for 6 weeks. Patient/ Caregiver education and instruction: Diagnosis, prognosis, other educated patient regarding findings consistent with DVT and importance of MD follow up prior to initiation of therapy    [x]  Plan of care has been reviewed with [facility]    G-Codes (GP)  Mobility   Current  CL= 60-79%   Goal  CK= 40-59%    The severity rating is based on clinical judgment and the FOTO score. Certification Period: 4/16/18 to 7/15/18  Win Elder, PT 4/16/2018 11:05 AM    ________________________________________________________________________    I certify that the above Therapy Services are being furnished while the patient is under my care. I agree with the treatment plan and certify that this therapy is necessary.     Physician's Signature:____________________  Date:____________Time: _________    Please sign and return to [facility] [employer] [facility]  [address] [place] [address]  [phone] fax

## 2018-04-17 ENCOUNTER — OFFICE VISIT (OUTPATIENT)
Dept: ORTHOPEDIC SURGERY | Facility: CLINIC | Age: 63
End: 2018-04-17

## 2018-04-17 ENCOUNTER — HOSPITAL ENCOUNTER (OUTPATIENT)
Dept: VASCULAR SURGERY | Age: 63
Discharge: HOME | End: 2018-04-17
Attending: ORTHOPAEDIC SURGERY
Payer: COMMERCIAL

## 2018-04-17 DIAGNOSIS — M79.662 PAIN OF LEFT CALF: Primary | ICD-10-CM

## 2018-04-17 DIAGNOSIS — M79.662 PAIN OF LEFT CALF: ICD-10-CM

## 2018-04-17 DIAGNOSIS — Z96.652 S/P TOTAL KNEE REPLACEMENT, LEFT: ICD-10-CM

## 2018-04-17 PROCEDURE — 93971 EXTREMITY STUDY: CPT

## 2018-04-17 PROCEDURE — 3331090002 HH PPS REVENUE DEBIT

## 2018-04-17 PROCEDURE — 3331090001 HH PPS REVENUE CREDIT

## 2018-04-17 NOTE — PROGRESS NOTES
Patient: Brenda Moura  YOB: 1955       HISTORY:  The patient presents for reevaluation of his s/p left total knee arthroplasty on 3/27/18. Patient is improved, states pain is a 5 out of 10.  he has participated in outpatient physical therapy. has been doing knee exercises at home. He is still having some lower leg swelling left worse then right. He went to PT yesterday and they were worried about the swelling. Pt here today complaining of calf pain. He has a history of DVT in the past. Patient denies any fever, chills, chest pain, shortness of breath. There are no new illness or injuries to report since last seen in the office. PHYSICAL EXAMINATION:    There were no vitals taken for this visit. The patient is a well-developed, well-nourished male in no acute distress. The patient is alert and oriented times three. The patient appears to be well groomed. Mood and affect are normal.   ORTHOPEDIC EXAM of Left knee: Inspection: Effusion present,  Incision healing nicely, swelling Left foot and lower leg  TTP: left foot bc of swelling, more tightness  Range of motion: 0-100 flexion  Stability: Stable   Strength: 5/5  2+ distal pulses      IMPRESSION:  Status post Left total knee arthroplasty. PLAN:  Pt comes in with persistant left lower leg swelling and new calf pain  Patient is weight bearing as tolerated. Will set up outpt physical therapy. We will order a stat doppler today to r/o DVT. Because of his history of DVT in the past and new calf pain. I want to make sure he does not have a DVT. Patient not given a refill of pain medication. Patient will follow up in 1 week.  To check on swelling and ROM    ESPERANZA [phone]

## 2018-04-17 NOTE — PROCEDURES
hospitals  *** FINAL REPORT ***    Name: Silver Holt  MRN: [id number]    Outpatient  : 1955  HIS Order #: [id number]  [id number] [facility] Visit #: [id number]  Date: 2018    TYPE OF TEST: Peripheral Venous Testing    REASON FOR TEST  Limb swelling    Left Leg:-  Deep venous thrombosis:           No  Superficial venous thrombosis:    No  Deep venous insufficiency:        Not examined  Superficial venous insufficiency: Not examined      INTERPRETATION/FINDINGS  Duplex images were obtained using 2-D gray scale, color flow, and  spectral Doppler analysis. Left leg :  1. Deep vein(s) visualized include the common femoral, proximal  femoral, mid femoral, distal femoral, popliteal(above knee),  popliteal(fossa), popliteal(below knee) and posterior tibial veins. 2. No evidence of deep venous thrombosis detected in the veins  visualized. 3. No evidence of deep vein thrombosis in the contralateral common  femoral vein. 4. Superficial vein(s) visualized include the great saphenous vein. 5. No evidence of superficial thrombosis detected. ADDITIONAL COMMENTS    I have personally reviewed the data relevant to the interpretation of  this  study. TECHNOLOGIST: Ollie Doran, [facility], RVT/  Signed: 2018 03:04 PM    PHYSICIAN: Melissa Bull MD  Signed: 2018 06:53 PM

## 2018-04-18 ENCOUNTER — TELEPHONE (OUTPATIENT)
Dept: ORTHOPEDIC SURGERY | Facility: CLINIC | Age: 63
End: 2018-04-18

## 2018-04-18 PROCEDURE — 3331090002 HH PPS REVENUE DEBIT

## 2018-04-18 PROCEDURE — 3331090001 HH PPS REVENUE CREDIT

## 2018-04-18 NOTE — TELEPHONE ENCOUNTER
Study was neg for DVT. Please have him continue to ice, elevate and take his fluid pills per his PCP recommendation.

## 2018-04-18 NOTE — TELEPHONE ENCOUNTER
Patient called in states that [address] him for an ultrasound to check for blood clots. He states he has not heard anything back and would like a call at [phone].

## 2018-04-19 VITALS
RESPIRATION RATE: 16 BRPM | DIASTOLIC BLOOD PRESSURE: 78 MMHG | TEMPERATURE: 98 F | HEART RATE: 82 BPM | OXYGEN SATURATION: 98 % | SYSTOLIC BLOOD PRESSURE: 125 MMHG

## 2018-04-19 PROCEDURE — 3331090002 HH PPS REVENUE DEBIT

## 2018-04-19 PROCEDURE — 3331090001 HH PPS REVENUE CREDIT

## 2018-04-20 ENCOUNTER — HOSPITAL ENCOUNTER (OUTPATIENT)
Dept: PHYSICAL THERAPY | Age: 63
Discharge: HOME | End: 2018-04-20
Payer: COMMERCIAL

## 2018-04-20 PROCEDURE — 3331090001 HH PPS REVENUE CREDIT

## 2018-04-20 PROCEDURE — 97110 THERAPEUTIC EXERCISES: CPT

## 2018-04-20 PROCEDURE — 3331090002 HH PPS REVENUE DEBIT

## 2018-04-20 NOTE — PROGRESS NOTES
PT DAILY TREATMENT NOTE     Patient Name: Rena Balbuena  Date:2018  : 1955  [x]  Patient  Verified  Payor: BLUE CROSS / Plan: [facility] / Product Type: PPO /    In time: 11:06  Out time:11:45  Total Treatment Time (min): 39  Visit #: 2 of     Treatment Area: Pain in left knee [M25.562]  Other acute postprocedural pain [G89.18]    SUBJECTIVE  Pain Level (0-10 scale): 2-3/10  Any medication changes, allergies to medications, adverse drug reactions, diagnosis change, or new procedure performed?: [x] No    [] Yes (see summary sheet for update)  Subjective functional status/changes:   [] No changes reported  Pt reports that he followed up with his MD.  They ordered a duplex of his left LE and ruled out DVT. They increased his fluid pills, and the swelling has been decreasing. This is the first day he has been able to put on a regular shoe as the swelling has been doing down. He used to sleep on his sides with a pillow between his knees, but he has not been able to get comfortable on his sides since the surgery. He sleeps on his back with his knees flat.      OBJECTIVE    Modality rationale: decrease inflammation and decrease pain to improve the patients ability to tolerate daily tasks   Min Type Additional Details    [] Estim:  []Unatt       []IFC  []Premod                        []Other:  []w/ice   []w/heat  Position:  Location:    [] Estim: []Att    []TENS instruct  []NMES                    []Other:  []w/US   []w/ice   []w/heat  Position:  Location:    []  Traction: [] Cervical       []Lumbar                       [] Prone          []Supine                       []Intermittent   []Continuous Lbs:  [] before manual  [] after manual    []  Ultrasound: []Continuous   [] Pulsed                           []1MHz   []3MHz W/cm2:  Location:    []  Iontophoresis with dexamethasone         Location: [] Take home patch   [] In clinic   10 [x]  Ice     []  heat  []  Ice massage  [] Laser   []  Anodyne Position: supine with HOB elevated and wedge under BLE  Location:left knee     []  Laser with stim  []  Other:  Position:  Location:    []  Vasopneumatic Device Pressure:       [] lo [] med [] hi   Temperature: [] lo [] med [] hi   [x] Skin assessment post-treatment:  [x]intact []redness- no adverse reaction    []redness - adverse reaction:     6 min [x]Eval - Finished Initial Evaluation, No Charge        23 min Therapeutic Exercise:  [x] See flow sheet :   Rationale: increase ROM and increase strength to improve the patients ability to improve ease of ambulation and daily activities             With   [] TE   [] TA   [] neuro   [] other: Patient Education: [x] Review HEP    [] Progressed/Changed HEP based on:   [] positioning   [] body mechanics   [] transfers   [] heat/ice application    [] other:      Other Objective/Functional Measures:     MMT  Hip Flexion 5/5 B  Knee Extension Right 5/5, Left 3+/5 p! Knee Flexion Right 5/5, Left 4/5 p! DF 4+/5 B    Right Knee AROM, PROM  Flexion 132, 135 dgrs   Extension +3, +5 dgrs    Left Knee AROM, PROM  Flexion 88 dgrs, p! with attempted PROM  Extension 0, +3 dgrs    Left knee AAROM flexion for last rep of heel slides: 102 dgrs     Challenged with interventions, quad lag evident with SLR  Ceased sit to stand after 5 reps d/t pain, increased pain was decreasing with rest/ice       Pain Level (0-10 scale) post treatment: 3-4/10    ASSESSMENT/Changes in Function:     Pt has returned to therapy after ruling out DVT. Finished initial evaluation and and initiated treatment per POC. Pt was highly motivated in therapy and put forth good effort with interventions  Will continue to address strength, flexibility, ROM, and edema for return to PLOF.      Patient will continue to benefit from skilled PT services to modify and progress therapeutic interventions, address functional mobility deficits, address ROM deficits, address strength deficits, analyze and address soft tissue restrictions, analyze and cue movement patterns, assess and modify postural abnormalities, address imbalance/dizziness and instruct in home and community integration to attain remaining goals. Progress towards goals / Updated goals:  Short Term Goals: To be accomplished in 1 weeks:  1. Pt will be compliant with initial HEP to improve therapy outcomes   Long Term Goals: To be accomplished in 6 weeks:  1. Pt will improve FOTO by 16 points in order to demonstrate functional improvement   2. Pt will improve left knee flexion AROM to 115 dgrs in order to improve gait pattern and ease of work tasks  3. Pt will improve left knee MMT to 4+/5 in order to improve ease of work tasks  4.  Pt will improve standing/ambulatory tolerance to 15 minutes in order to improve ease of work tasks    PLAN  [x]  Upgrade activities as tolerated     [x]  Continue plan of care  [x]  Update interventions per flow sheet       []  Discharge due to:_  []  Other:_      Joan Schmidt, PT 4/20/2018  11:09 AM    Future Appointments  Date Time Provider [facility] [place]   4/26/2018 9:00 AM CARA Ahumada [facility] [place] SCHED   4/30/2018 9:30 AM Analisa Corona  [address] [place]   5/15/2018 3:30 PM Viktoria Bergman  [address]

## 2018-04-21 PROCEDURE — 3331090002 HH PPS REVENUE DEBIT

## 2018-04-21 PROCEDURE — 3331090001 HH PPS REVENUE CREDIT

## 2018-04-22 PROCEDURE — 3331090002 HH PPS REVENUE DEBIT

## 2018-04-22 PROCEDURE — 3331090001 HH PPS REVENUE CREDIT

## 2018-04-23 ENCOUNTER — TELEPHONE (OUTPATIENT)
Dept: ORTHOPEDIC SURGERY | Age: 63
End: 2018-04-23

## 2018-04-23 ENCOUNTER — HOSPITAL ENCOUNTER (OUTPATIENT)
Dept: PHYSICAL THERAPY | Age: 63
Discharge: HOME | End: 2018-04-23
Payer: COMMERCIAL

## 2018-04-23 PROCEDURE — 3331090002 HH PPS REVENUE DEBIT

## 2018-04-23 PROCEDURE — 3331090001 HH PPS REVENUE CREDIT

## 2018-04-23 PROCEDURE — 97110 THERAPEUTIC EXERCISES: CPT

## 2018-04-23 NOTE — PROGRESS NOTES
PT DAILY TREATMENT NOTE     Patient Name: Niecy Adames  Date:2018  : 1955  [x]  Patient  Verified  Payor: BLUE CROSS / Plan: [employer] [facility] [place] / Product Type: PPO /    In time:10:35  Out time:11:30  Total Treatment Time (min): 55  Visit #: 3 of     Treatment Area: Pain in left knee [M25.562]  Other acute postprocedural pain [G89.18]    SUBJECTIVE  Pain Level (0-10 scale): 310  Any medication changes, allergies to medications, adverse drug reactions, diagnosis change, or new procedure performed?: [x] No    [] Yes (see summary sheet for update)  Subjective functional status/changes:   [] No changes reported  Pt reports that he is doing all of the exercises at home. He is using at theraband for the heel slides as he does not have a belt or a dog leash. He has a hard time with the sit to stands from lower surfaces.       OBJECTIVE    Modality rationale: decrease inflammation and decrease pain to improve the patients ability to tolerate daily tasks   Min Type Additional Details    [] Estim:  []Unatt       []IFC  []Premod                        []Other:  []w/ice   []w/heat  Position:  Location:    [] Estim: []Att    []TENS instruct  []NMES                    []Other:  []w/US   []w/ice   []w/heat  Position:  Location:    []  Traction: [] Cervical       []Lumbar                       [] Prone          []Supine                       []Intermittent   []Continuous Lbs:  [] before manual  [] after manual    []  Ultrasound: []Continuous   [] Pulsed                           []1MHz   []3MHz W/cm2:  Location:    []  Iontophoresis with dexamethasone         Location: [] Take home patch   [] In clinic   10 [x]  Ice     []  heat  []  Ice massage  []  Laser   []  Anodyne Position: supine with HOB elevated and wedge under BLE  Location: left knee     []  Laser with stim  []  Other:  Position:  Location:    []  Vasopneumatic Device Pressure:       [] lo [] med [] hi   Temperature: [] lo [] med [] hi [x] Skin assessment post-treatment:  [x]intact []redness- no adverse reaction    []redness - adverse reaction:       45 min Therapeutic Exercise:  [x] See flow sheet :   Rationale: increase ROM, increase strength, improve balance and increase proprioception to improve the patients ability to improve ease of ambulation and daily activities             With   [] TE   [] TA   [] neuro   [] other: Patient Education: [x] Review HEP    [] Progressed/Changed HEP based on:   [] positioning   [] body mechanics   [] transfers   [] heat/ice application    [] other:      Other Objective/Functional Measures:     Reviewed HEP - advised pt to use bed sheet with heel slides  Reviewed importance of increased anterior WS with sit to stands  No difficulty with 4\" step ups  Continues to demonstrate minor quad lag with SLR     Incision is healing without signs of complication. Steri strips have fallen off. Edema in left calf is decreasing     Pain Level (0-10 scale) post treatment: 3/10    ASSESSMENT/Changes in Function:     Pt is making slow, steady progress towards initial goals in therapy. Pt is compliant with initial HEP and demonstrates improving ability with interventions. Edema is decreasing and range is improving. Will continue to address strength and ROM deficits for return to PLOF. Patient will continue to benefit from skilled PT services to modify and progress therapeutic interventions, address functional mobility deficits, address ROM deficits, address strength deficits, analyze and address soft tissue restrictions, analyze and cue movement patterns, assess and modify postural abnormalities, address imbalance/dizziness and instruct in home and community integration to attain remaining goals. Progress towards goals / Updated goals:  Short Term Goals: To be accomplished in 1 weeks:  1. Pt will be compliant with initial HEP to improve therapy outcomes Goal met.  4/23/18  Long Term Goals: To be accomplished in 6 weeks:  1. Pt will improve FOTO by 16 points in order to demonstrate functional improvement   2. Pt will improve left knee flexion AROM to 115 dgrs in order to improve gait pattern and ease of work tasks  3. Pt will improve left knee MMT to 4+/5 in order to improve ease of work tasks  4.  Pt will improve standing/ambulatory tolerance to 15 minutes in order to improve ease of work tasks    PLAN  [x]  Upgrade activities as tolerated     [x]  Continue plan of care  [x]  Update interventions per flow sheet       []  Discharge due to:_  []  Other:_      Emma Pill, PT 4/23/2018  10:36 AM    Future Appointments  Date Time Provider [place]   4/24/2018 11:00 AM Emma Pill, PT [facility]   4/26/2018 9:00 AM CARA Aguilar [facility]   4/27/2018 11:00 AM Vanessa Mcdermott, PTA [facility]   4/30/2018 9:30 AM Sulaiman Montelongo MD [address]   5/2/2018 11:00 AM Vanessa Mcdermott, PTA [facility]   5/4/2018 11:00 AM Vanessa Mcdermott, PTA [facility]   5/7/2018 11:00 AM Emma Pill, PT [facility]   5/9/2018 11:00 AM aVnessa Mcdermott, PTA [facility]   5/11/2018 10:30 AM Emma Pill, PT [id number]   5/14/2018 11:00 AM Vanessa Mcdermott, PTA [facility]   5/15/2018 3:30 PM Ismael Grant  [address]   5/16/2018 11:00 AM Vanessa Mcdermott, PTA [facility]   5/18/2018 11:00 AM Emma Pill, PT [facility]

## 2018-04-23 NOTE — TELEPHONE ENCOUNTER
Gudelia pharmacist at [facility] is calling for patient as he is request refill of Warfarin. He was previiously getting at different pharmacy and there was no refills on the other prescription. Does Dr Salas Wheeler want this patiet to still be on the Warfarin?   Will need a new prescription called to [facility]  at [phone]  Fax no [phone]

## 2018-04-24 ENCOUNTER — HOSPITAL ENCOUNTER (OUTPATIENT)
Dept: PHYSICAL THERAPY | Age: 63
Discharge: HOME | End: 2018-04-24
Payer: COMMERCIAL

## 2018-04-24 PROCEDURE — 3331090002 HH PPS REVENUE DEBIT

## 2018-04-24 PROCEDURE — 97140 MANUAL THERAPY 1/> REGIONS: CPT

## 2018-04-24 PROCEDURE — 3331090001 HH PPS REVENUE CREDIT

## 2018-04-24 PROCEDURE — 97110 THERAPEUTIC EXERCISES: CPT

## 2018-04-24 NOTE — PROGRESS NOTES
PT DAILY TREATMENT NOTE     Patient Name: Joel Lawton  Date:2018  : 1955  [x]  Patient  Verified  Payor: BLUE CROSS / Plan:  [facility] / Product Type: PPO /    In time:11:13  Out time:12:00  Total Treatment Time (min): 52  Visit #: 4 of     Treatment Area: Pain in left knee [M25.562]  Other acute postprocedural pain [G89.18]    SUBJECTIVE  Pain Level (0-10 scale): 3/10  Any medication changes, allergies to medications, adverse drug reactions, diagnosis change, or new procedure performed?: [x] No    [] Yes (see summary sheet for update)  Subjective functional status/changes:   [] No changes reported  Pt reports that he is doing well. His knee continues to get better and the swelling is going down. He is having some lateral thigh/knee pain with standing.       OBJECTIVE    Modality rationale: decrease inflammation, decrease pain and increase tissue extensibility to improve the patients ability to tolerate daily tasks   Min Type Additional Details    [] Estim:  []Unatt       []IFC  []Premod                        []Other:  []w/ice   []w/heat  Position:  Location:    [] Estim: []Att    []TENS instruct  []NMES                    []Other:  []w/US   []w/ice   []w/heat  Position:  Location:    []  Traction: [] Cervical       []Lumbar                       [] Prone          []Supine                       []Intermittent   []Continuous Lbs:  [] before manual  [] after manual    []  Ultrasound: []Continuous   [] Pulsed                           []1MHz   []3MHz W/cm2:  Location:    []  Iontophoresis with dexamethasone         Location: [] Take home patch   [] In clinic   10 [x]  Ice     [x]  heat  []  Ice massage  []  Laser   []  Anodyne Position: supine with HOB elevated and wedge under BLE  Location: ice - left knee, heat - left ITB    []  Laser with stim  []  Other:  Position:  Location:    []  Vasopneumatic Device Pressure:       [] lo [] med [] hi   Temperature: [] lo [] med [] hi [x] Skin assessment post-treatment:  [x]intact []redness- no adverse reaction    []redness - adverse reaction:       29 min Therapeutic Exercise:  [x] See flow sheet :   Rationale: increase ROM and increase strength to improve the patients ability to improve ease of ambulation and ADLs    8 min Manual Therapy:  DTM/TPR left lateral quad/distal ITB, tennis ball DTM left lateral quad/distal ITB   Rationale: decrease pain, increase ROM, increase tissue extensibility and decrease trigger points to improve ease of standing/ambulation            With   [] TE   [] TA   [] neuro   [] other: Patient Education: [x] Review HEP    [] Progressed/Changed HEP based on:   [] positioning   [] body mechanics   [] transfers   [] heat/ice application    [] other:      Other Objective/Functional Measures:     Correct form with mini squats  Challenged with 4\" step ups for final reps  Challenged with LAQ for final reps, evidenced by decreasing height    Trigger points/hypertonicity left ITB, addressed manually  Reduced pain/tenderness following manual     Correct form with clams, pain following completion of clams that subsided with 1 min rest break     Pain Level (0-10 scale) post treatment: 0/10    ASSESSMENT/Changes in Function:     Pt is making slow/steady progress towards therapy goals. Edema is decreasing and improving strength/range are evident with interventions. Pt reports increasing ITB pain likely with kinetic chain alignment deficits with valgus collapse contributing. Initiated manual to address ITB/lateral quad hypertonicity. Initiate glute x 3 next session to further progress glute strength and to increase WS and ability with SLS. Will continue to address strength, ROM, and flexibility deficits for improved ease of ambulation and return to PLOF.      Patient will continue to benefit from skilled PT services to modify and progress therapeutic interventions, address ROM deficits, address strength deficits, analyze and address soft tissue restrictions, analyze and cue movement patterns, assess and modify postural abnormalities and instruct in home and community integration to attain remaining goals. Progress towards goals / Updated goals:  Short Term Goals: To be accomplished in 1 weeks:  1. Pt will be compliant with initial HEP to improve therapy outcomes Goal met. 4/23/18  Long Term Goals: To be accomplished in 6 weeks:  1. Pt will improve FOTO by 16 points in order to demonstrate functional improvement   2. Pt will improve left knee flexion AROM to 115 dgrs in order to improve gait pattern and ease of work tasks  3. Pt will improve left knee MMT to 4+/5 in order to improve ease of work tasks  4.  Pt will improve standing/ambulatory tolerance to 15 minutes in order to improve ease of work tasks       PLAN  []  Upgrade activities as tolerated     [x]  Continue plan of care  []  Update interventions per flow sheet       []  Discharge due to:_  []  Other:_      Claudette Jensen, PT 4/24/2018  9:31 AM    Future Appointments  Date Time Provider Dex [place]   4/24/2018 11:00 AM Claudette Jensen, PT [facility]   4/26/2018 9:00 AM CARA Mera [facility]   4/27/2018 11:00 AM Angy Alfaro PTA [facility]   4/30/2018 9:30 AM Lv Head MD [address]   5/2/2018 11:00 AM Angy Alfaro PTA [facility]   5/4/2018 11:00 AM Angy Alfaro PTA [facility]   5/7/2018 11:00 AM Claudette Jensen, PT [facility]   5/9/2018 11:00 AM Angy Alfaro PTA [facility]   5/11/2018 10:30 AM Claudette Jensen, PT [facility]   5/14/2018 11:00 AM Angy Alfaro PTA [facility]   5/15/2018 3:30 PM Des Rogers  [address]   5/16/2018 11:00 AM Angy Alfaro, PTA [facility]   5/18/2018 11:00 AM Claudette Jensen, PT [facility]

## 2018-04-25 PROCEDURE — 3331090001 HH PPS REVENUE CREDIT

## 2018-04-25 PROCEDURE — 3331090002 HH PPS REVENUE DEBIT

## 2018-04-26 ENCOUNTER — OFFICE VISIT (OUTPATIENT)
Dept: ORTHOPEDIC SURGERY | Facility: CLINIC | Age: 63
End: 2018-04-26

## 2018-04-26 VITALS
SYSTOLIC BLOOD PRESSURE: 131 MMHG | TEMPERATURE: 96 F | HEIGHT: 70 IN | RESPIRATION RATE: 16 BRPM | WEIGHT: 220 LBS | OXYGEN SATURATION: 96 % | BODY MASS INDEX: 31.5 KG/M2 | DIASTOLIC BLOOD PRESSURE: 73 MMHG | HEART RATE: 79 BPM

## 2018-04-26 DIAGNOSIS — G89.18 POST-OPERATIVE PAIN: ICD-10-CM

## 2018-04-26 DIAGNOSIS — Z96.652 S/P TOTAL KNEE REPLACEMENT, LEFT: Primary | ICD-10-CM

## 2018-04-26 PROCEDURE — 3331090001 HH PPS REVENUE CREDIT

## 2018-04-26 PROCEDURE — 3331090002 HH PPS REVENUE DEBIT

## 2018-04-26 RX ORDER — HYDROCODONE BITARTRATE AND ACETAMINOPHEN 10; 325 MG/1; MG/1
1 TABLET ORAL
Qty: 40 TAB | Refills: 0 | Status: SHIPPED | OUTPATIENT
Start: 2018-04-26 | End: 2018-08-30 | Stop reason: ALTCHOICE

## 2018-04-26 NOTE — PROGRESS NOTES
Patient: Yuan Murphy  YOB: 1955       HISTORY:  The patient presents for reevaluation of his s/p left total knee arthroplasty on 3/27/18. Patient is improved, states pain is a 3 out of 10.  he has participated in outpatient physical therapy. has been doing knee exercises at home. His swelling is improving on a week to week basis. Patient denies any fever, chills, chest pain, shortness of breath or calf pain. There are no new illness or injuries to report since last seen in the office. PHYSICAL EXAMINATION:    Visit Vitals    /73    Pulse 79    Temp 96 °F (35.6 °C) (Oral)    Resp 16    Ht 5' 10\" (1.778 m)    Wt 220 lb (99.8 kg)    SpO2 96%    BMI 31.57 kg/m2     The patient is a well-developed, well-nourished male in no acute distress. The patient is alert and oriented times three. The patient appears to be well groomed. Mood and affect are normal.   ORTHOPEDIC EXAM of Left knee: Inspection: Effusion present,  Incision healing nicely, swelling improving  TTP: none  Range of motion: 0-100 flexion  Stability: Stable   Strength: 5/5  2+ distal pulses      IMPRESSION:  Status post Left total knee arthroplasty. PLAN:  Pt is doing well post operatively  Patient is weight bearing as tolerated. Will continue outpt physical therapy. Doppler was negative for DVT  Patient given a refill of pain medication. Patient given pain medication for short term acute pain relief. Goal is to treat patient according to above plan and to ultimately have patient off all pain medications once appropriate. If chronic pain management is required beyond what is expected for current orthopedic problem, will refer patient to pain management.  was reviewed and will be reviewed with every medication refill request.   Patient will follow up in 2 week.  To check on ROM and [id number] [facility]

## 2018-04-27 ENCOUNTER — HOSPITAL ENCOUNTER (OUTPATIENT)
Dept: PHYSICAL THERAPY | Age: 63
Discharge: HOME | End: 2018-04-27
Payer: COMMERCIAL

## 2018-04-27 PROCEDURE — 3331090002 HH PPS REVENUE DEBIT

## 2018-04-27 PROCEDURE — 3331090001 HH PPS REVENUE CREDIT

## 2018-04-27 PROCEDURE — 97110 THERAPEUTIC EXERCISES: CPT

## 2018-04-27 RX ORDER — WARFARIN 3 MG/1
TABLET ORAL
Qty: 30 TAB | Refills: 3 | Status: SHIPPED | OUTPATIENT
Start: 2018-04-27 | End: 2019-08-01 | Stop reason: SDUPTHER

## 2018-04-27 RX ORDER — WARFARIN SODIUM 5 MG/1
TABLET ORAL
Qty: 75 TAB | Refills: 0 | Status: SHIPPED | OUTPATIENT
Start: 2018-04-27 | End: 2018-06-13 | Stop reason: SDUPTHER

## 2018-04-27 NOTE — PROGRESS NOTES
PT DAILY TREATMENT NOTE     Patient Name: Amira Proper  Date:2018  : 1955  [x]  Patient  Verified  Payor: BLUE CROSS / Plan: [employer] [facility] / Product Type: PPO /    In time:1105  Out time:1146  Total Treatment Time (min): 41  Visit #: 5 of     Treatment Area: Pain in left knee [M25.562]  Other acute postprocedural pain [G89.18]    SUBJECTIVE  Pain Level (0-10 scale): 3/10  Any medication changes, allergies to medications, adverse drug reactions, diagnosis change, or new procedure performed?: [x] No    [] Yes (see summary sheet for update)  Subjective functional status/changes:   [] No changes reported  Pt stated that he is about the same    OBJECTIVE    Modality rationale: decrease inflammation and decrease pain to improve the patients ability to increase ease with ADLs   Min Type Additional Details    [] Estim:  []Unatt       []IFC  []Premod                        []Other:  []w/ice   []w/heat  Position:  Location:    [] Estim: []Att    []TENS instruct  []NMES                    []Other:  []w/US   []w/ice   []w/heat  Position:  Location:    []  Traction: [] Cervical       []Lumbar                       [] Prone          []Supine                       []Intermittent   []Continuous Lbs:  [] before manual  [] after manual    []  Ultrasound: []Continuous   [] Pulsed                           []1MHz   []3MHz W/cm2:  Location:    []  Iontophoresis with dexamethasone         Location: [] Take home patch   [] In clinic   10 [x]  Ice     []  heat  []  Ice massage  []  Laser   []  Anodyne Position:supine  Location:left knee    []  Laser with stim  []  Other:  Position:  Location:    []  Vasopneumatic Device Pressure:       [] lo [] med [] hi   Temperature: [] lo [] med [] hi   [x] Skin assessment post-treatment:  [x]intact []redness- no adverse reaction    []redness - adverse reaction:     31 min Therapeutic Exercise:  [x] See flow sheet :   Rationale: increase ROM and increase strength to improve the patients ability to increase ease with ADLs    With   [x] TE   [] TA   [] neuro   [] other: Patient Education: [x] Review HEP    [] Progressed/Changed HEP based on:   [] positioning   [] body mechanics   [] transfers   [] heat/ice application    [] other:      Other Objective/Functional Measures:   Pt had some difficulty with SLR  Reported that he has been doing all the exercises he can remember that he does here. Had some difficulty straightening left knee after clams    Pain Level (0-10 scale) post treatment: 0/10    ASSESSMENT/Changes in Function:   Pt is slowly progressing toward goals. Pt cont with mild pain in left knee. Cont with decreased strength and range of motion in left knee. Pt cont with decreased walking and standing tolerance    Patient will continue to benefit from skilled PT services to modify and progress therapeutic interventions, address functional mobility deficits, address ROM deficits and address strength deficits to attain remaining goals. [x]  See Plan of Care  []  See progress note/recertification  []  See Discharge Summary         Progress towards goals / Updated goals:  Short Term Goals: To be accomplished in 1 weeks:  1. Pt will be compliant with initial HEP to improve therapy outcomes   Goal met. 4/23/18  Long Term Goals: To be accomplished in 6 weeks:  1. Pt will improve FOTO by 16 points in order to demonstrate functional improvement   2. Pt will improve left knee flexion AROM to 115 dgrs in order to improve gait pattern and ease of work tasks  3. Pt will improve left knee MMT to 4+/5 in order to improve ease of work tasks  4.  Pt will improve standing/ambulatory tolerance to 15 minutes in order to improve ease of work tasks    PLAN  []  Upgrade activities as tolerated     [x]  Continue plan of care  []  Update interventions per flow sheet       []  Discharge due to:_  []  Other:_      Eamon Owusu PTA 4/27/2018  11:45 AM    Future Appointments  Date Time Provider Department Center   4/30/2018 9:30 AM Marlen Hebert  [address] [place]   5/2/2018 11:00 AM [place] Eliel, PTA [facility]   5/4/2018 11:00 AM Rich Eliel, PTA [facility]   5/7/2018 11:00 AM Bharath Brennan, PT [facility]   5/9/2018 11:00 AM Richcalli Elizabethd, PTA [facility]   5/10/2018 10:15 AM Miguel Angel Gee PA-C [facility]   5/11/2018 10:30 AM Bharath Brennan, PT [facility]   5/14/2018 11:00 AM [place] Eliel, PTA [facility]   5/15/2018 3:30 PM Qasim Ricks  [address]   5/16/2018 11:00 AM Rich Julian, PTA [facility]   5/18/2018 11:00 AM Bharath Brennan, PT [facility]

## 2018-04-28 PROCEDURE — 3331090001 HH PPS REVENUE CREDIT

## 2018-04-28 PROCEDURE — 3331090002 HH PPS REVENUE DEBIT

## 2018-04-29 PROCEDURE — 3331090002 HH PPS REVENUE DEBIT

## 2018-04-29 PROCEDURE — 3331090001 HH PPS REVENUE CREDIT

## 2018-04-30 ENCOUNTER — OFFICE VISIT (OUTPATIENT)
Dept: INTERNAL MEDICINE CLINIC | Age: 63
End: 2018-04-30

## 2018-04-30 VITALS
BODY MASS INDEX: 31.64 KG/M2 | TEMPERATURE: 98.4 F | OXYGEN SATURATION: 97 % | DIASTOLIC BLOOD PRESSURE: 74 MMHG | HEART RATE: 83 BPM | WEIGHT: 221 LBS | SYSTOLIC BLOOD PRESSURE: 136 MMHG | HEIGHT: 70 IN | RESPIRATION RATE: 18 BRPM

## 2018-04-30 DIAGNOSIS — Z79.01 WARFARIN ANTICOAGULATION: ICD-10-CM

## 2018-04-30 DIAGNOSIS — R60.0 LEG EDEMA, LEFT: ICD-10-CM

## 2018-04-30 DIAGNOSIS — I10 ESSENTIAL HYPERTENSION: Primary | ICD-10-CM

## 2018-04-30 DIAGNOSIS — Z12.5 PROSTATE CANCER SCREENING: ICD-10-CM

## 2018-04-30 PROCEDURE — 3331090002 HH PPS REVENUE DEBIT

## 2018-04-30 PROCEDURE — 3331090001 HH PPS REVENUE CREDIT

## 2018-04-30 RX ORDER — FUROSEMIDE 20 MG/1
TABLET ORAL
Qty: 30 TAB | Refills: 1 | Status: SHIPPED | OUTPATIENT
Start: 2018-04-30 | End: 2018-05-30 | Stop reason: ALTCHOICE

## 2018-04-30 NOTE — PROGRESS NOTES
1. Have you been to the ER, urgent care clinic since your last visit? Hospitalized since your last visit? No    2. Have you seen or consulted any other health care providers outside of the [facility] since your last visit? Include any pap smears or colon screening.  No

## 2018-04-30 NOTE — PROGRESS NOTES
The patient presents to the office today with the chief complaint of LLE swelling    HPI    The patient has persistent LLE swelling. This has decreased using a higher dose of Lasix      Review of Systems   Respiratory: Negative for shortness of breath. Cardiovascular: Negative for chest pain and leg swelling. Allergies   Allergen Reactions    Augmentin [Amoxicillin-Pot Clavulanate] Itching    Hibiclens [Chlorhexidine Gluconate] Itching    Penicillins Rash       Current Outpatient Prescriptions   Medication Sig Dispense Refill    furosemide (LASIX) 20 mg tablet 2 tablets every other day 30 Tab 1    warfarin (COUMADIN) 5 mg tablet TAKE 2 AND 1/2 TABLETS BY MOUTH DAILY OR AS DIRECTED 75 Tab 0    warfarin (COUMADIN) 3 mg tablet Or as directed 30 Tab 3    raNITIdine (ZANTAC) 150 mg tablet Take 150 mg by mouth nightly. 5    tamsulosin (FLOMAX) 0.4 mg capsule Take 1 Cap by mouth daily. 30 Cap 2    oxyCODONE IR (ROXICODONE) 15 mg immediate release tablet Take 1 Tab by mouth every six (6) hours as needed for Pain. Max Daily Amount: 60 mg. 28 Tab 0    celecoxib (CELEBREX) 200 mg capsule Take 1 Cap by mouth two (2) times a day for 90 days. 60 Cap 2    lisinopril-hydroCHLOROthiazide (PRINZIDE, ZESTORETIC) 20-12.5 mg per tablet TAKE 1 TABLET BY MOUTH DAILY 90 Tab 0    labetalol (NORMODYNE) 100 mg tablet TAKE ONE TABLET BY MOUTH TWICE DAILY 180 Tab 0    azelastine (ASTELIN) 137 mcg (0.1 %) nasal spray 1 spray up each nostril twice per day (Patient taking differently: 1 Spray by Both Nostrils route daily. Using once per day) 1 Bottle 1    metaxalone (SKELAXIN) 800 mg tablet Take 1 Tab by mouth three (3) times daily. Indications: Muscle Spasm (Patient taking differently: Take 800 mg by mouth three (3) times daily as needed.  Indications: Muscle Spasm) 90 Tab 2    montelukast (SINGULAIR) 10 mg tablet TAKE 1 TABLET BY MOUTH EVERY DAY (Patient taking differently: TAKE 1 TABLET BY MOUTH EVERY HS) 90 Tab 0    butalbital-acetaminophen-caff (FIORICET) -40 mg per capsule Take 2 capsules every 8 hours as needed for headache 540 Cap 3    lansoprazole (PREVACID) 30 mg capsule TAKE 1 CAPSULE DAILY BEFOREBREAKFAST 90 Cap 3    acetaminophen (TYLENOL) 500 mg tablet Take 1,000 mg by mouth every six (6) hours as needed for Pain.  HYDROcodone-acetaminophen (NORCO)  mg tablet Take 1 Tab by mouth every eight (8) hours as needed for Pain. Max Daily Amount: 3 Tabs. 40 Tab 0    docusate sodium (COLACE) 50 mg capsule Take 1 Cap by mouth two (2) times a day for 90 days. 61 Cap 2       Past Medical History:   Diagnosis Date    Arthritis     Bleeding     Blood clot in the legs     Chronic pain     knee and shoulder    Esophageal reflux     GERD (gastroesophageal reflux disease)     Headache(784.0)     migraine    High cholesterol     Hypertension     Lower back pain 11/6/2010    Other chest pain     Personal history of venous thrombosis and embolism     Pure hypercholesterolemia     Right buttock pain 11/6/2010    Right foot pain     Rotator cuff tear     left-since 2010, worsened tear Jan.    Spinal stenosis     Tendonitis, tibialis     anterior    Thromboembolus ([address]     3 after sx last one 2000       Past Surgical History:   Procedure Laterality Date    FOOT/TOES SURGERY PROC UNLISTED      HX BACK SURGERY      HX ORTHOPAEDIC  06-25-12    Right foot with excision of bursa and adipose tissue from fifth metatarsal base by Dr. Lee Gibbs      lower back (1992 and 2000)    HX OTHER SURGICAL      left foot (2008)    LAMINOTOMY      NERVE BLOCK         Social History     Social History    Marital status:      Spouse name: N/A    Number of children: N/A    Years of education: N/A     Occupational History    Not on file.      Social History Main Topics    Smoking status: Never Smoker    Smokeless tobacco: Never Used    Alcohol use No    Drug use: No    Sexual activity: Not Currently     Other Topics Concern    Not on file     Social History Narrative       Patient does not have an advanced directive on file    Visit Vitals    /74 (BP 1 Location: Right arm, BP Patient Position: Sitting)    Pulse 83    Temp 98.4 °F (36.9 °C) (Tympanic)    Resp 18    Ht 5' 10\" (1.778 m)    Wt 221 lb (100.2 kg)    SpO2 97%    BMI 31.71 kg/m2       Physical Exam   Musculoskeletal: He exhibits edema (2+ edema). Incision is well healed  Range of motion is 100 degrees to 0 degrees           Hospital Outpatient Visit on 04/04/2018   Component Date Value Ref Range Status    C-Reactive protein 04/04/2018 6.1* 0 - 0.3 mg/dL Final    WBC 04/04/2018 6.4  4.6 - 13.2 K/uL Final    RBC 04/04/2018 3.42* 4.70 - 5.50 M/uL Final    HGB 04/04/2018 10.8* 13.0 - 16.0 g/dL Final    HCT 04/04/2018 33.2* 36.0 - 48.0 % Final    MCV 04/04/2018 97.1* 74.0 - 97.0 FL Final    MCH 04/04/2018 31.6  24.0 - 34.0 PG Final    MCHC 04/04/2018 32.5  31.0 - 37.0 g/dL Final    RDW 04/04/2018 12.3  11.6 - 14.5 % Final    PLATELET 89/03/7274 750  135 - 420 K/uL Final    MPV 04/04/2018 9.6  9.2 - 11.8 FL Final    NEUTROPHILS 04/04/2018 66  40 - 73 % Final    LYMPHOCYTES 04/04/2018 15* 21 - 52 % Final    MONOCYTES 04/04/2018 12* 3 - 10 % Final    EOSINOPHILS 04/04/2018 6* 0 - 5 % Final    BASOPHILS 04/04/2018 1  0 - 2 % Final    ABS. NEUTROPHILS 04/04/2018 4.3  1.8 - 8.0 K/UL Final    ABS. LYMPHOCYTES 04/04/2018 0.9  0.9 - 3.6 K/UL Final    ABS. MONOCYTES 04/04/2018 0.8  0.05 - 1.2 K/UL Final    ABS. EOSINOPHILS 04/04/2018 0.4  0.0 - 0.4 K/UL Final    ABS.  BASOPHILS 04/04/2018 0.0  0.0 - 0.06 K/UL Final    DF 04/04/2018 AUTOMATED    Final    Sodium 04/04/2018 140  136 - 145 mmol/L Final    Potassium 04/04/2018 4.0  3.5 - 5.5 mmol/L Final    Chloride 04/04/2018 103  100 - 108 mmol/L Final    CO2 04/04/2018 30  21 - 32 mmol/L Final    Anion gap 04/04/2018 7  3.0 - 18 mmol/L Final    Glucose 04/04/2018 127* 74 - 99 mg/dL Final    BUN 04/04/2018 14  7.0 - 18 MG/DL Final    Creatinine 04/04/2018 0.87  0.6 - 1.3 MG/DL Final    BUN/Creatinine ratio 04/04/2018 16  12 - 20   Final    GFR est AA 04/04/2018 >60  >60 ml/min/1.73m2 Final    GFR est non-AA 04/04/2018 >60  >60 ml/min/1.73m2 Final    Comment: (NOTE)  Estimated GFR is calculated using the Modification of Diet in Renal   Disease (MDRD) Study equation, reported for both  Americans   (GFRAA) and non- Americans (GFRNA), and normalized to 1.73m2   body surface area. The physician must decide which value applies to   the patient. The MDRD study equation should only be used in   individuals age 25 or older. It has not been validated for the   following: pregnant women, patients with serious comorbid conditions,   or on certain medications, or persons with extremes of body size,   muscle mass, or nutritional status.  Calcium 04/04/2018 9.2  8.5 - 10.1 MG/DL Final    Bilirubin, total 04/04/2018 0.3  0.2 - 1.0 MG/DL Final    ALT (SGPT) 04/04/2018 30  16 - 61 U/L Final    AST (SGOT) 04/04/2018 21  15 - 37 U/L Final    Alk. phosphatase 04/04/2018 52  45 - 117 U/L Final    Protein, total 04/04/2018 6.2* 6.4 - 8.2 g/dL Final    Albumin 04/04/2018 2.9* 3.4 - 5.0 g/dL Final    Globulin 04/04/2018 3.3  2.0 - 4.0 g/dL Final    A-G Ratio 04/04/2018 0.9  0.8 - 1.7   Final   Admission on 03/27/2018, Discharged on 03/28/2018   Component Date Value Ref Range Status    INR (POC) 03/27/2018 1.0  <1.2   Final    The intended use of the i-STAT PT/INR is for monitoring oral anticoagulant therapy and not for evaluating individual factor deficiencies.  WBC 03/28/2018 9.6  4.6 - 13.2 K/uL Final    RBC 03/28/2018 3.73* 4.70 - 5.50 M/uL Final    HGB 03/28/2018 11.7* 13.0 - 16.0 g/dL Final    This is a post-surgery specimen.     HCT 03/28/2018 35.6* 36.0 - 48.0 % Final    MCV 03/28/2018 95.4  74.0 - 97.0 FL Final    MCH 03/28/2018 31.4  24.0 - 34.0 PG Final    MCHC 03/28/2018 32.9  31.0 - 37.0 g/dL Final    RDW 03/28/2018 13.0  11.6 - 14.5 % Final    PLATELET 03/04/7872 303  135 - 420 K/uL Final    MPV 03/28/2018 10.7  9.2 - 11.8 FL Final    NEUTROPHILS 03/28/2018 73  40 - 73 % Final    LYMPHOCYTES 03/28/2018 14* 21 - 52 % Final    MONOCYTES 03/28/2018 13* 3 - 10 % Final    EOSINOPHILS 03/28/2018 0  0 - 5 % Final    BASOPHILS 03/28/2018 0  0 - 2 % Final    ABS. NEUTROPHILS 03/28/2018 7.0  1.8 - 8.0 K/UL Final    ABS. LYMPHOCYTES 03/28/2018 1.3  0.9 - 3.6 K/UL Final    ABS. MONOCYTES 03/28/2018 1.2  0.05 - 1.2 K/UL Final    ABS. EOSINOPHILS 03/28/2018 0.0  0.0 - 0.4 K/UL Final    ABS.  BASOPHILS 03/28/2018 0.0  0.0 - 0.1 K/UL Final    DF 03/28/2018 AUTOMATED    Final    Prothrombin time 03/28/2018 14.9  11.5 - 15.2 sec Final    INR 03/28/2018 1.2  0.8 - 1.2   Final    Comment:            INR Therapeutic Ranges         (on stable oral anticoagulant):     INDICATION                INR  DVT/PE/Atrial Fib          2.0-3.0  MI/Mechanical Heart Valve  2.5-3.5     Hospital Outpatient Visit on 03/22/2018   Component Date Value Ref Range Status    Color 03/22/2018 YELLOW    Final    Appearance 03/22/2018 CLEAR    Final    Specific gravity 03/22/2018 1.006  1.005 - 1.030   Final    pH (UA) 03/22/2018 6.5  5.0 - 8.0   Final    Protein 03/22/2018 NEGATIVE   NEG mg/dL Final    Glucose 03/22/2018 NEGATIVE   NEG mg/dL Final    Ketone 03/22/2018 NEGATIVE   NEG mg/dL Final    Bilirubin 03/22/2018 NEGATIVE   NEG   Final    Blood 03/22/2018 NEGATIVE   NEG   Final    Urobilinogen 03/22/2018 0.2  0.2 - 1.0 EU/dL Final    Nitrites 03/22/2018 NEGATIVE   NEG   Final    Leukocyte Esterase 03/22/2018 NEGATIVE   NEG   Final    Special Requests: 03/22/2018 NO SPECIAL REQUESTS    Final    Culture result: 03/22/2018 NO GROWTH 1 DAY    Final   Hospital Outpatient Visit on 03/16/2018   Component Date Value Ref Range Status    WBC 03/16/2018 8.5  4.6 - 13.2 K/uL Final    RBC 03/16/2018 4.48* 4.70 - 5.50 M/uL Final    HGB 03/16/2018 14.7  13.0 - 16.0 g/dL Final    HCT 03/16/2018 42.3  36.0 - 48.0 % Final    MCV 03/16/2018 94.4  74.0 - 97.0 FL Final    MCH 03/16/2018 32.8  24.0 - 34.0 PG Final    MCHC 03/16/2018 34.8  31.0 - 37.0 g/dL Final    RDW 03/16/2018 13.0  11.6 - 14.5 % Final    PLATELET 83/66/7631 180  135 - 420 K/uL Final    MPV 03/16/2018 11.3  9.2 - 11.8 FL Final    NEUTROPHILS 03/16/2018 71  40 - 73 % Final    LYMPHOCYTES 03/16/2018 17* 21 - 52 % Final    MONOCYTES 03/16/2018 11* 3 - 10 % Final    EOSINOPHILS 03/16/2018 1  0 - 5 % Final    BASOPHILS 03/16/2018 0  0 - 2 % Final    ABS. NEUTROPHILS 03/16/2018 6.0  1.8 - 8.0 K/UL Final    ABS. LYMPHOCYTES 03/16/2018 1.5  0.9 - 3.6 K/UL Final    ABS. MONOCYTES 03/16/2018 0.9  0.05 - 1.2 K/UL Final    ABS. EOSINOPHILS 03/16/2018 0.1  0.0 - 0.4 K/UL Final    ABS.  BASOPHILS 03/16/2018 0.0  0.0 - 0.06 K/UL Final    DF 03/16/2018 AUTOMATED    Final    Prothrombin time 03/16/2018 23.5* 11.5 - 15.2 sec Final    INR 03/16/2018 2.2* 0.8 - 1.2   Final    Comment:            INR Therapeutic Ranges         (on stable oral anticoagulant):     INDICATION                INR  DVT/PE/Atrial Fib          2.0-3.0  MI/Mechanical Heart Valve  2.5-3.5      aPTT 03/16/2018 32.2  23.0 - 36.4 SEC Final    Sodium 03/16/2018 142  136 - 145 mmol/L Final    Potassium 03/16/2018 4.0  3.5 - 5.5 mmol/L Final    Chloride 03/16/2018 104  100 - 108 mmol/L Final    CO2 03/16/2018 30  21 - 32 mmol/L Final    Anion gap 03/16/2018 8  3.0 - 18 mmol/L Final    Glucose 03/16/2018 126* 74 - 99 mg/dL Final    BUN 03/16/2018 16  7.0 - 18 MG/DL Final    Creatinine 03/16/2018 0.98  0.6 - 1.3 MG/DL Final    BUN/Creatinine ratio 03/16/2018 16  12 - 20   Final    GFR est AA 03/16/2018 >60  >60 ml/min/1.73m2 Final    GFR est non-AA 03/16/2018 >60  >60 ml/min/1.73m2 Final    Comment: (NOTE)  Estimated GFR is calculated using the Modification of Diet in Renal   Disease (MDRD) Study equation, reported for both  Americans   (GFRAA) and non- Americans (GFRNA), and normalized to 1.73m2   body surface area. The physician must decide which value applies to   the patient. The MDRD study equation should only be used in   individuals age 25 or older. It has not been validated for the   following: pregnant women, patients with serious comorbid conditions,   or on certain medications, or persons with extremes of body size,   muscle mass, or nutritional status.       Calcium 03/16/2018 9.1  8.5 - 10.1 MG/DL Final    Crossmatch Expiration 03/16/2018 03/30/2018   Final    ABO/Rh(D) 03/16/2018 B POSITIVE   Final    Antibody screen 03/16/2018 NEG   Final    Color 03/16/2018 YELLOW    Final    Appearance 03/16/2018 CLEAR    Final    Specific gravity 03/16/2018 1.015  1.005 - 1.030   Final    pH (UA) 03/16/2018 6.5  5.0 - 8.0   Final    Protein 03/16/2018 NEGATIVE   NEG mg/dL Final    Glucose 03/16/2018 NEGATIVE   NEG mg/dL Final    Ketone 03/16/2018 NEGATIVE   NEG mg/dL Final    Bilirubin 03/16/2018 NEGATIVE   NEG   Final    Blood 03/16/2018 NEGATIVE   NEG   Final    Urobilinogen 03/16/2018 0.2  0.2 - 1.0 EU/dL Final    Nitrites 03/16/2018 NEGATIVE   NEG   Final    Leukocyte Esterase 03/16/2018 TRACE* NEG   Final    Special Requests: 03/16/2018 NO SPECIAL REQUESTS    Final    Culture result: 03/16/2018 NO GROWTH 1 DAY    Final    WBC 03/16/2018 0 to 2  0 - 4 /hpf Final    RBC 03/16/2018 NONE  0 - 5 /hpf Final    Epithelial cells 03/16/2018 FEW  0 - 5 /lpf Final    Bacteria 03/16/2018 NEGATIVE   NEG /hpf Final   Office Visit on 02/19/2018   Component Date Value Ref Range Status    VALID INTERNAL CONTROL POC 02/19/2018 Yes   Final    QuickVue Influenza test 02/19/2018 Positive  Negative Final    Influenza A    VALID INTERNAL CONTROL POC 02/19/2018 Yes   Final    Quick[facility] Influenza test 02/19/2018 Negative  Negative Final [address] Outpatient Visit on 01/31/2018   Component Date Value Ref Range Status    WBC 01/31/2018 7.3  4.6 - 13.2 K/uL Final    RBC 01/31/2018 4.42* 4.70 - 5.50 M/uL Final    HGB 01/31/2018 14.7  13.0 - 16.0 g/dL Final    HCT 01/31/2018 42.9  36.0 - 48.0 % Final    MCV 01/31/2018 97.1* 74.0 - 97.0 FL Final    MCH 01/31/2018 33.3  24.0 - 34.0 PG Final    MCHC 01/31/2018 34.3  31.0 - 37.0 g/dL Final    RDW 01/31/2018 12.5  11.6 - 14.5 % Final    PLATELET 87/33/0619 018  135 - 420 K/uL Final    MPV 01/31/2018 11.3  9.2 - 11.8 FL Final    NEUTROPHILS 01/31/2018 66  40 - 73 % Final    LYMPHOCYTES 01/31/2018 20* 21 - 52 % Final    MONOCYTES 01/31/2018 12* 3 - 10 % Final    EOSINOPHILS 01/31/2018 2  0 - 5 % Final    BASOPHILS 01/31/2018 0  0 - 2 % Final    ABS. NEUTROPHILS 01/31/2018 4.8  1.8 - 8.0 K/UL Final    ABS. LYMPHOCYTES 01/31/2018 1.4  0.9 - 3.6 K/UL Final    ABS. MONOCYTES 01/31/2018 0.9  0.05 - 1.2 K/UL Final    ABS. EOSINOPHILS 01/31/2018 0.2  0.0 - 0.4 K/UL Final    ABS.  BASOPHILS 01/31/2018 0.0  0.0 - 0.06 K/UL Final    DF 01/31/2018 AUTOMATED    Final    Prothrombin time 01/31/2018 17.0* 11.5 - 15.2 sec Final    INR 01/31/2018 1.4* 0.8 - 1.2   Final    Comment:            INR Therapeutic Ranges         (on stable oral anticoagulant):     INDICATION                INR  DVT/PE/Atrial Fib          2.0-3.0  MI/Mechanical Heart Valve  2.5-3.5      aPTT 01/31/2018 31.3  23.0 - 36.4 SEC Final    Sodium 01/31/2018 140  136 - 145 mmol/L Final    Potassium 01/31/2018 4.1  3.5 - 5.5 mmol/L Final    Chloride 01/31/2018 103  100 - 108 mmol/L Final    CO2 01/31/2018 30  21 - 32 mmol/L Final    Anion gap 01/31/2018 7  3.0 - 18 mmol/L Final    Glucose 01/31/2018 89  74 - 99 mg/dL Final    BUN 01/31/2018 12  7.0 - 18 MG/DL Final    Creatinine 01/31/2018 0.92  0.6 - 1.3 MG/DL Final    BUN/Creatinine ratio 01/31/2018 13  12 - 20   Final    GFR est AA 01/31/2018 >60  >60 ml/min/1.73m2 Final    GFR est non-AA 01/31/2018 >60  >60 ml/min/1.73m2 Final    Comment: (NOTE)  Estimated GFR is calculated using the Modification of Diet in Renal   Disease (MDRD) Study equation, reported for both  Americans   (GFRAA) and non- Americans (GFRNA), and normalized to 1.73m2   body surface area. The physician must decide which value applies to   the patient. The MDRD study equation should only be used in   individuals age 25 or older. It has not been validated for the   following: pregnant women, patients with serious comorbid conditions,   or on certain medications, or persons with extremes of body size,   muscle mass, or nutritional status.  Calcium 01/31/2018 9.3  8.5 - 10.1 MG/DL Final    Albumin 01/31/2018 3.9  3.4 - 5.0 g/dL Final    Hemoglobin A1c 01/31/2018 5.8* 4.2 - 5.6 % Final    Comment: (NOTE)  HbA1C Interpretive Ranges  <5.7              Normal  5.7 - 6.4         Consider Prediabetes  >6.5              Consider Diabetes      Est. average glucose 01/31/2018 120  mg/dL Final    Comment: (NOTE)  The eAG should be interpreted with patient characteristics in mind   since ethnicity, interindividual differences, red cell lifespan,   variation in rates of glycation, etc. may affect the validity of the   calculation.       Crossmatch Expiration 01/31/2018 02/14/2018   Final    ABO/Rh(D) 01/31/2018 B POSITIVE   Final    Antibody screen 01/31/2018 NEG   Final    Color 01/31/2018 YELLOW    Final    Appearance 01/31/2018 CLEAR    Final    Specific gravity 01/31/2018 1.012  1.005 - 1.030   Final    pH (UA) 01/31/2018 7.0  5.0 - 8.0   Final    Protein 01/31/2018 NEGATIVE   NEG mg/dL Final    Glucose 01/31/2018 NEGATIVE   NEG mg/dL Final    Ketone 01/31/2018 NEGATIVE   NEG mg/dL Final    Bilirubin 01/31/2018 NEGATIVE   NEG   Final    Blood 01/31/2018 NEGATIVE   NEG   Final    Urobilinogen 01/31/2018 0.2  0.2 - 1.0 EU/dL Final    Nitrites 01/31/2018 NEGATIVE   NEG Final    Leukocyte Esterase 01/31/2018 NEGATIVE   NEG   Final    Special Requests: 01/31/2018 NO SPECIAL REQUESTS    Final    Culture result: 01/31/2018 NO GROWTH 1 DAY    Final    Ventricular Rate 01/31/2018 62  BPM Final    Atrial Rate 01/31/2018 62  BPM Final    P-R Interval 01/31/2018 180  ms Final    QRS Duration 01/31/2018 86  ms Final    Q-T Interval 01/31/2018 394  ms Final    QTC Calculation (Bezet) 01/31/2018 399  ms Final    Calculated P Axis 01/31/2018 58  degrees Final    Calculated R Axis 01/31/2018 12  degrees Final    Calculated T Axis 01/31/2018 64  degrees Final    Diagnosis 01/31/2018    Final                    Value:Normal sinus rhythm  Possible Left atrial enlargement  Cannot exclude Inferior infarct , age undetermined  Nonspecific ST and T wave abnormality Lateral leads  Borderline ECG  When compared with ECG of 11-JUN-2012 12:04,  No significant change was found  Confirmed by Peri Reynolds [id number]) on 2/1/2018 7:08:50 AM         .No results found for any visits on 04/30/18. Assessment / Plan      ICD-10-CM ICD-9-CM    1. Essential hypertension I10 401.9 furosemide (LASIX) 20 mg tablet      CBC WITH AUTOMATED DIFF      METABOLIC PANEL, COMPREHENSIVE      PSA SCREENING (SCREENING)      PROTHROMBIN TIME + INR      CANCELED: CBC WITH AUTOMATED DIFF      CANCELED: METABOLIC PANEL, COMPREHENSIVE   2. Leg edema, left R60.0 782.3 furosemide (LASIX) 20 mg tablet      CBC WITH AUTOMATED DIFF      METABOLIC PANEL, COMPREHENSIVE      PSA SCREENING (SCREENING)      PROTHROMBIN TIME + INR      CANCELED: CBC WITH AUTOMATED DIFF      CANCELED: METABOLIC PANEL, COMPREHENSIVE   3. Warfarin anticoagulation Z79.01 V58.61 furosemide (LASIX) 20 mg tablet      CBC WITH AUTOMATED DIFF      METABOLIC PANEL, COMPREHENSIVE      PSA SCREENING (SCREENING)      PROTHROMBIN TIME + INR   4.  Prostate cancer screening Z12.5 V76.44 furosemide (LASIX) 20 mg tablet      CBC WITH AUTOMATED DIFF      METABOLIC PANEL, COMPREHENSIVE      PSA SCREENING (SCREENING)      PROTHROMBIN TIME + INR       Labs  he was advised to continue his maintenance medications      Follow-up Disposition:  Return in about 2 months (around 6/30/2018). I asked Opal Lew if he has any questions and I answered the questions.   Opal Lew states that he understands the treatment plan and agrees with the treatment plan

## 2018-04-30 NOTE — MR AVS SNAPSHOT
[address] [facility] 
 
 
 [address], [address] [facility] [address] 
[phone] Patient: Steven Mercedes MRN: [id number] KJV:[id number] Visit Information Date & Time Provider Department Dept. Phone Encounter #  
 4/30/2018  9:30 AM Raciel Soto MD [facility] INTERNAL MEDICINE OF [facility] Javier Pe[facility]aloza [phone] [id number] Your Appointments 5/10/2018 10:15 AM  
POST OP with Jaime Chavez PA-C  
[facility] [place] [address] [place] [address] [facility] Appt Note: 2 WK FU LT KNEE  
 [address] [facility], [address] [facility] [address]  
[phone]  
  
   
 [address], [address] [address] 5/15/2018  3:30 PM  
Follow Up with Jakob Chen MD  
[facility] Appt Note: 3mo fu  
 [address] [facility] [address]  
[phone]  
  
   
 [address] [facility] [address] 5/30/2018 10:30 AM  
Follow Up with Raicel Soto MD  
[address] [place] [address] [facility] Appt Note: 1 mo f/u  
 [address], [address] [facility] [address]  
  
   
 [address], [address] [facility] [address] Upcoming Health Maintenance Date Due Hepatitis C Screening 1955 ZOSTER VACCINE AGE 60> 2/13/2013 GLAUCOMA SCREENING Q2Y 4/13/2018 Pneumococcal 65+ Low/Medium Risk (1 of 2 - PCV13) 4/13/2018 Influenza Age 5 to Adult 8/1/2018 DTaP/Tdap/Td series (2 - Td) 12/6/2024 COLONOSCOPY 5/27/2026 Allergies as of 4/30/2018  Review Complete On: 4/30/2018 By: Edna Marcus LPN Severity Noted Reaction Type Reactions Augmentin [Amoxicillin-pot Clavulanate]    Itching Hibiclens [Chlorhexidine Gluconate]  03/16/2018    Itching Penicillins    Rash Current Immunizations  Reviewed on 3/16/2018 Name Date Tdap 12/6/2014 Not reviewed this visit You Were Diagnosed With   
  
 Codes Comments Essential hypertension    -  Primary ICD-10-CM: I10 
ICD-9-CM: 401.9 Leg edema, left     ICD-10-CM: R60.0 ICD-9-CM: 910. 3 Warfarin anticoagulation     ICD-10-CM: Z79.01 
ICD-9-CM: V58.61 Prostate cancer screening     ICD-10-CM: Z12.5 ICD-9-CM: V76.44 Vitals BP Pulse Temp Resp Height(growth percentile) 136/74 (BP 1 Location: Right arm, BP Patient Position: Sitting) 83 98.4 °F (36.9 °C) (Tympanic) 18 5' 10\" (1.778 m) Weight(growth percentile) SpO2 BMI Smoking Status 221 lb (100.2 kg) 97% 31.71 kg/m2 Never Smoker BMI and BSA Data Body Mass Index Body Surface Area 31.71 kg/m 2 2.22 m 2 Preferred Pharmacy Pharmacy Name Phone [facility] [address] [place] [address] Your Updated Medication List  
  
   
This list is accurate as of 4/30/18 11:12 AM.  Always use your most recent med list.  
  
  
  
  
 acetaminophen 500 mg tablet Commonly known as:  TYLENOL Take 1,000 mg by mouth every six (6) hours as needed for Pain. azelastine 137 mcg (0.1 %) nasal spray Commonly known as:  ASTELIN  
1 spray up each nostril twice per day  
  
 butalbital-acetaminophen-caff -40 mg per capsule Commonly known as:  [employer] Take 2 capsules every 8 hours as needed for headache  
  
 celecoxib 200 mg capsule Commonly known as:  CELEBREX Take 1 Cap by mouth two (2) times a day for 90 days. docusate sodium 50 mg capsule Commonly known as:  Vernard Oleg Take 1 Cap by mouth two (2) times a day for 90 days. furosemide 20 mg tablet Commonly known as:  LASIX  
2 tablets every other day HYDROcodone-acetaminophen  mg tablet Commonly known as:  [place] Take 1 Tab by mouth every eight (8) hours as needed for Pain. Max Daily Amount: 3 Tabs. L. gasseri-B. bifidum-B longum 1.5 billion cell Cap Take 1 Cap by mouth daily. labetalol 100 mg tablet Commonly known as:  NORMODYNE  
TAKE ONE TABLET BY MOUTH TWICE DAILY  
  
 lansoprazole 30 mg capsule Commonly known as:  PREVACID TAKE 1 CAPSULE DAILY BEFOREBREAKFAST  
  
 lisinopril-hydroCHLOROthiazide 20-12.5 mg per tablet Commonly known as:  PRINZIDE, ZESTORETIC  
TAKE 1 TABLET BY MOUTH DAILY  
  
 metaxalone 800 mg tablet Commonly known as:  SKELAXIN Take 1 Tab by mouth three (3) times daily. Indications: Muscle Spasm  
  
 montelukast 10 mg tablet Commonly known as:  SINGULAIR  
TAKE 1 TABLET BY MOUTH EVERY DAY  
  
 nystatin topical cream  
Commonly known as:  MYCOSTATIN Apply  to affected area two (2) times a day. * oxyCODONE IR 15 mg immediate release tablet Commonly known as:  Onita Estimable Take 1 Tab by mouth every six (6) hours as needed for Pain. Max Daily Amount: 60 mg.  
  
 * oxyCODONE IR 5 mg immediate release tablet Commonly known as:  Onita Estimable Take 2 Tabs by mouth every six (6) hours as needed. Max Daily Amount: 40 mg.  
  
 predniSONE 20 mg tablet Commonly known as:  DELTASONE  
3 tabs x 2 days, 2 tabs x 3 days, 1 tab x 4 days, 1/2 tab x 5 days  
  
 raNITIdine 150 mg tablet Commonly known as:  ZANTAC Take 150 mg by mouth nightly. tamsulosin 0.4 mg capsule Commonly known as:  FLOMAX Take 1 Cap by mouth daily. * warfarin 5 mg tablet Commonly known as:  COUMADIN  
TAKE 2 AND 1/2 TABLETS BY MOUTH DAILY OR AS DIRECTED * warfarin 3 mg tablet Commonly known as:  COUMADIN Or as directed * Notice: This list has 4 medication(s) that are the same as other medications prescribed for you. Read the directions carefully, and ask your doctor or other care provider to review them with you. Prescriptions Sent to Pharmacy Refills  
 furosemide (LASIX) 20 mg tablet 1 Si tablets every other day  Class: Normal  
 Pharmacy: [facility] [address] [place] [address] [address] [place] [address] #: [phone] To-Do List   
 04/30/2018 Lab:  METABOLIC PANEL, COMPREHENSIVE   
  
 04/30/2018 Lab:  PROTHROMBIN TIME + INR   
  
 04/30/2018 Lab:  PSA SCREENING (SCREENING) 05/02/2018 11:00 AM  
  Appointment with Yvon Gay PTA at [facility] [address] [place] ([phone]) 05/04/2018 11:00 AM  
  Appointment with Yvon Gay PTA at [facility] PT [address] [place] ([phone])  
  
 05/07/2018 11:00 AM  
  Appointment with Arlin Brown PT at [facility] PT [address] ([phone])  
  
 05/09/2018 11:00 AM  
  Appointment with Yvon Gay PTA at [facility] PT [address] [place] ([phone])  
  
 05/11/2018 10:30 AM  
  Appointment with Arlin Brown PT at [facility] PT [address] ([phone])  
  
 05/14/2018 11:00 AM  
  Appointment with Yvon Gay PTA at [facility] PT [address] [place] ([phone])  
  
 05/16/2018 11:00 AM  
  Appointment with Yvon Gay PTA at [facility] PT [address] [place] ([phone])  
  
 05/18/2018 11:00 AM  
  Appointment with Arlin Brown PT at [facility] PT [address] [place] ([phone])  
  
 08/30/2018 Lab:  CBC WITH AUTOMATED DIFF Introducing [facility] SERVICES! Vivian Sarah introduces [employer] patient portal. Now you can access parts of your medical record, email your doctor's office, and request medication refills online. 1. In your internet browser, go to [email or web address] 2. Click on the First Time User? Click Here link in the Sign In box. You will see the New Member Sign Up page. 3. Enter your [employer] Access Code exactly as it appears below. You will not need to use this code after youve completed the sign-up process. If you do not sign up before the expiration date, you must request a new code. · [employer] Access Code: [email or web address] Expires: 5/17/2018 10:23 AM 
 
 4. Enter the last four digits of your Social Security Number (xxxx) and Date of Birth (mm/dd/yyyy) as indicated and click Submit. You will be taken to the next sign-up page. 5. Create a [employer] ID. This will be your [employer] login ID and cannot be changed, so think of one that is secure and easy to remember. 6. Create a [employer] password. You can change your password at any time. 7. Enter your Password Reset Question and Answer. This can be used at a later time if you forget your password. 8. Enter your e-mail address. You will receive e-mail notification when new information is available in [address] 9. Click Sign Up. You can now view and download portions of your medical record. 10. Click the Download Summary menu link to download a portable copy of your medical information. If you have questions, please visit the Frequently Asked Questions section of the [employer] website. Remember, [employer] is NOT to be used for urgent needs. For medical emergencies, dial 911. Now available from your iPhone and Android! Please provide this summary of care documentation to your next provider. Your primary care clinician is listed as Selina Gorman. If you have any questions after today's visit, please call [phone].

## 2018-05-01 PROCEDURE — 3331090001 HH PPS REVENUE CREDIT

## 2018-05-01 PROCEDURE — 3331090002 HH PPS REVENUE DEBIT

## 2018-05-02 ENCOUNTER — HOSPITAL ENCOUNTER (OUTPATIENT)
Dept: PHYSICAL THERAPY | Age: 63
Discharge: HOME | End: 2018-05-02
Payer: MEDICARE

## 2018-05-02 PROCEDURE — 97110 THERAPEUTIC EXERCISES: CPT

## 2018-05-02 PROCEDURE — 3331090002 HH PPS REVENUE DEBIT

## 2018-05-02 PROCEDURE — 3331090001 HH PPS REVENUE CREDIT

## 2018-05-02 NOTE — PROGRESS NOTES
PT DAILY TREATMENT NOTE     Patient Name: Layne Hilton  Date:2018  : 1955  [x]  Patient  Verified  Payor: BLUE CROSS / Plan: [employer] [facility] / Product Type: PPO /    In time:1102  Out time:1145  Total Treatment Time (min): 43  Visit #: 6 of     Treatment Area: Pain in left knee [M25.562]  Other acute postprocedural pain [G89.18]    SUBJECTIVE  Pain Level (0-10 scale): 3-4/10  Any medication changes, allergies to medications, adverse drug reactions, diagnosis change, or new procedure performed?: [x] No    [] Yes (see summary sheet for update)  Subjective functional status/changes:   [] No changes reported  Pt stated that he over did things yesterday and has a little more pain in the knee and his low back really hurts    OBJECTIVE    Modality rationale: decrease inflammation, decrease pain and increase tissue extensibility to improve the patients ability to increase ease with ADLs   Min Type Additional Details    [] Estim:  []Unatt       []IFC  []Premod                        []Other:  []w/ice   []w/heat  Position:  Location:    [] Estim: []Att    []TENS instruct  []NMES                    []Other:  []w/US   []w/ice   []w/heat  Position:  Location:    []  Traction: [] Cervical       []Lumbar                       [] Prone          []Supine                       []Intermittent   []Continuous Lbs:  [] before manual  [] after manual    []  Ultrasound: []Continuous   [] Pulsed                           []1MHz   []3MHz W/cm2:  Location:    []  Iontophoresis with dexamethasone         Location: [] Take home patch   [] In clinic   10 [x]  Ice knee     [x]  Heat low back  []  Ice massage  []  Laser   []  Anodyne Position:seated  Location:    []  Laser with stim  []  Other:  Position:  Location:    []  Vasopneumatic Device Pressure:       [] lo [] med [] hi   Temperature: [] lo [] med [] hi   [x] Skin assessment post-treatment:  [x]intact []redness- no adverse reaction    []redness - adverse reaction:     33 min Therapeutic Exercise:  [x] See flow sheet :   Rationale: increase ROM and increase strength to improve the patients ability to increase ease with ADLs    With   [x] TE   [] TA   [] neuro   [] other: Patient Education: [x] Review HEP    [] Progressed/Changed HEP based on:   [] positioning   [] body mechanics   [] transfers   [] heat/ice application    [] other:      Other Objective/Functional Measures:   Pt cont to have extension lag with SLR  Had some increased low back pain with standing exercises and bridges, but no increased knee pain  Had some knee stiffness with extension after heel slides     Pain Level (0-10 scale) post treatment: 2-3/10    ASSESSMENT/Changes in Function:   Pt cont to progress fairly well toward goals. Cont with decreased strength and range of motion in left knee, but is improving. Pt reports increased ease with getting out of a chair and walking. Patient will continue to benefit from skilled PT services to modify and progress therapeutic interventions, address functional mobility deficits, address ROM deficits and address strength deficits to attain remaining goals. [x]  See Plan of Care  []  See progress note/recertification  []  See Discharge Summary         Progress towards goals / Updated goals:  Short Term Goals: To be accomplished in 1 weeks:  1. Pt will be compliant with initial HEP to improve therapy outcomes   Goal met. 4/23/18  Long Term Goals: To be accomplished in 6 weeks:  1. Pt will improve FOTO by 16 points in order to demonstrate functional improvement   2. Pt will improve left knee flexion AROM to 115 dgrs in order to improve gait pattern and ease of work tasks  3. Pt will improve left knee MMT to 4+/5 in order to improve ease of work tasks  4.  Pt will improve standing/ambulatory tolerance to 15 minutes in order to improve ease of work tasks    PLAN  []  Upgrade activities as tolerated     [x]  Continue plan of care  []  Update interventions per flow sheet       []  Discharge due to:_  []  Other:_      Kayce Reina PTA 5/2/2018  11:17 AM    Future Appointments  Date Time Provider Dex Hazeli   5/4/2018 11:00 AM Kayce Reina PTA [facility]   5/7/2018 11:00 AM Lanre Covington, PT [facility]   5/9/2018 11:00 AM Kayce Reina PTA [facility]   5/10/2018 10:15 AM Jonah Grove PA-C [facility]   5/11/2018 10:30 AM Lanre Covington, PT [facility]   5/14/2018 11:00 AM Kayce Reina PTA [facility]   5/15/2018 3:30 PM Sara Benedict  [address]    5/16/2018 11:00 AM Kayce Reina PTA [facility]   5/18/2018 11:00 AM Lanre Covington, PT [facility]   5/30/2018 10:30 AM Sully Ramírez MD [facility]

## 2018-05-03 PROCEDURE — 3331090002 HH PPS REVENUE DEBIT

## 2018-05-03 PROCEDURE — 3331090001 HH PPS REVENUE CREDIT

## 2018-05-04 ENCOUNTER — HOSPITAL ENCOUNTER (OUTPATIENT)
Dept: PHYSICAL THERAPY | Age: 63
Discharge: HOME | End: 2018-05-04
Payer: MEDICARE

## 2018-05-04 PROCEDURE — 97016 VASOPNEUMATIC DEVICE THERAPY: CPT

## 2018-05-04 PROCEDURE — 3331090002 HH PPS REVENUE DEBIT

## 2018-05-04 PROCEDURE — 3331090001 HH PPS REVENUE CREDIT

## 2018-05-04 PROCEDURE — 97110 THERAPEUTIC EXERCISES: CPT

## 2018-05-04 NOTE — PROGRESS NOTES
PT DAILY TREATMENT NOTE     Patient Name: Milagro Douglas  Date:2018  : 1955  [x]  Patient  Verified  Payor: VA MEDICARE / Plan: VA MEDICARE PART A & B / Product Type: Medicare /    In time:1100  Out time:1145  Total Treatment Time (min): 45  Visit #: 7 of     Treatment Area: Pain in left knee [M25.562]  Other acute postprocedural pain [G89.18]    SUBJECTIVE  Pain Level (0-10 scale): 3/10  Any medication changes, allergies to medications, adverse drug reactions, diagnosis change, or new procedure performed?: [x] No    [] Yes (see summary sheet for update)  Subjective functional status/changes:   [] No changes reported  Pt stated that he is barely using the cane now, only outside    OBJECTIVE    Modality rationale: decrease inflammation and decrease pain to improve the patients ability to increase ease with walking   Min Type Additional Details    [] Estim:  []Unatt       []IFC  []Premod                        []Other:  []w/ice   []w/heat  Position:  Location:    [] Estim: []Att    []TENS instruct  []NMES                    []Other:  []w/US   []w/ice   []w/heat  Position:  Location:    []  Traction: [] Cervical       []Lumbar                       [] Prone          []Supine                       []Intermittent   []Continuous Lbs:  [] before manual  [] after manual    []  Ultrasound: []Continuous   [] Pulsed                           []1MHz   []3MHz W/cm2:  Location:    []  Iontophoresis with dexamethasone         Location: [] Take home patch   [] In clinic    []  Ice     []  heat  []  Ice massage  []  Laser   []  Anodyne Position:  Location:    []  Laser with stim  []  Other:  Position:  Location:   15 [x]  Vasopneumatic Device Pressure:       [x] lo [] med [] hi   Temperature: [x] lo [] med [] hi   [x] Skin assessment post-treatment:  [x]intact []redness- no adverse reaction    []redness - adverse reaction:     30 min Therapeutic Exercise:  [x] See flow sheet :   Rationale: increase ROM and increase strength to improve the patients ability to increase ease with ADLs    With   [x] TE   [] TA   [] neuro   [] other: Patient Education: [x] Review HEP    [] Progressed/Changed HEP based on:   [] positioning   [] body mechanics   [] transfers   [] heat/ice application    [] other:      Other Objective/Functional Measures:   Had increased pain with SAQ with ball squeeze  Strength in left is improving and will increased resistance next visit per flow sheet  Pt was instructed in heel toe walking and was able to perform, but quality of gait decreased with fatigue     Pain Level (0-10 scale) post treatment: 2/10    ASSESSMENT/Changes in Function:   Pt cont to slowly progress toward goals. Strength and range of motion are slowly improving. Pt cont to ambulate with flat footed gait. Was instructed on proper gait with heel strike and toe off. Was told to practice walking in this manner to improve gait and decrease limp. Pt is only using SPC with outside ambulation    Patient will continue to benefit from skilled PT services to modify and progress therapeutic interventions, address functional mobility deficits, address ROM deficits and address strength deficits to attain remaining goals. [x]  See Plan of Care  []  See progress note/recertification  []  See Discharge Summary         Progress towards goals / Updated goals:  Short Term Goals: To be accomplished in 1 weeks:  1. Pt will be compliant with initial HEP to improve therapy outcomes   Goal met. 4/23/18  Long Term Goals: To be accomplished in 6 weeks:  1. Pt will improve FOTO by 16 points in order to demonstrate functional improvement   2. Pt will improve left knee flexion AROM to 115 dgrs in order to improve gait pattern and ease of work tasks  Progressing. 5/4/18  3. Pt will improve left knee MMT to 4+/5 in order to improve ease of work tasks  4.  Pt will improve standing/ambulatory tolerance to 15 minutes in order to improve ease of work tasks    PLAN  []  Upgrade activities as tolerated     [x]  Continue plan of care  []  Update interventions per flow sheet       []  Discharge due to:_  []  Other:_      Tara Quiñones PTA 5/4/2018  11:01 AM    Future Appointments  Date Time Provider Dex Hazeli   5/7/2018 11:00 AM Sivan Gilmore, PT [facility]   5/9/2018 11:00 AM Tara Quiñones PTA [facility]   5/10/2018 10:15 AM Emiliano Gray PA-C [facility]   5/11/2018 10:30 AM Sivan Gilmore, PT [facility]   5/14/2018 11:00 AM Tara Quiñones PTA [facility]   5/15/2018 3:30 PM Yanet Nguyen  [address]   5/16/2018 11:00 AM Tara Quiñones PTA [facility]   5/18/2018 11:00 AM Anai Krishnan [facility]   5/30/2018 10:30 AM Eduardo Murry  [address] [place]

## 2018-05-05 PROCEDURE — 3331090002 HH PPS REVENUE DEBIT

## 2018-05-05 PROCEDURE — 3331090001 HH PPS REVENUE CREDIT

## 2018-05-06 PROCEDURE — 3331090002 HH PPS REVENUE DEBIT

## 2018-05-06 PROCEDURE — 3331090001 HH PPS REVENUE CREDIT

## 2018-05-07 ENCOUNTER — HOSPITAL ENCOUNTER (OUTPATIENT)
Dept: PHYSICAL THERAPY | Age: 63
Discharge: HOME | End: 2018-05-07
Payer: MEDICARE

## 2018-05-07 PROCEDURE — G8979 MOBILITY GOAL STATUS: HCPCS

## 2018-05-07 PROCEDURE — 3331090001 HH PPS REVENUE CREDIT

## 2018-05-07 PROCEDURE — 3331090002 HH PPS REVENUE DEBIT

## 2018-05-07 PROCEDURE — 97016 VASOPNEUMATIC DEVICE THERAPY: CPT

## 2018-05-07 PROCEDURE — G8978 MOBILITY CURRENT STATUS: HCPCS

## 2018-05-07 PROCEDURE — 97164 PT RE-EVAL EST PLAN CARE: CPT

## 2018-05-07 PROCEDURE — 97110 THERAPEUTIC EXERCISES: CPT

## 2018-05-07 NOTE — PROGRESS NOTES
[facility] [place] [employer] [facility] [employer]  ERIC  [address] [facility]  [phone] fax    Physical Therapy Progress Note  Patient name: Della Cash Start of Care: 18   Referral source: Fawn Mary MD : 1955   Medical/Treatment Diagnosis: Pain in left knee [M25.562]  Other acute postprocedural pain [G89.18] Onset Date:3/27/18     Prior Hospitalization: see medical history Provider#: [id number]   Medications: Verified on Patient Summary List    Comorbidities: HTN, cervical/lumbar spinal stenosis with fusions, arthritis, hx of DVTs, torn left RTC - planning on surgery once left TKA is healed   Prior Level of 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 Pt will improve FOTO by 16 points in order to demonstrate functional improvement  2. Pt will improve left knee flexion AROM to 115 dgrs in order to improve gait pattern and ease of work tasks  3. Pt will improve left knee MMT to 5/5 in order to improve ease of work tasks    4. Pt will report no difficulty with car transfers in order to perform work tasks     ASSESSMENT/RECOMMENDATIONS:  Pt is making steady progress in therapy, meeting 3/6 initial goals and progressing with FOTO, knee flexion AROM, and knee extension MMT goals.   He reports improving standing/ambulatory tolerance (15-20 minutes), and improving FOTO score is indicative of functional improvement. He is no longer using AD for household ambulation, but continues to use cane outdoors and on uneven surfaces. Edema is decreasing, however, pt continues to present with redness and edema of left LE. Denies sx of infection/DVT, and Well's Clinical Prediction Rules indicate low risk of DVT. Pt reports 65% overall improvement, with his greatest remaining concerns including walking outdoors/on uneven surfaces, stairs, and car transfers. Pt will benefit from continued skilled PT to address remaining ROM, strength, and functional mobility deficits in order to perform work tasks and return to [facility]. [x]Continue therapy per initial plan/protocol at a frequency of  2 x per week for 5 weeks  []Continue therapy with the following recommended changes:_____________________      _____________________________________________________________________  []Discontinue therapy progressing towards or have reached established goals  []Discontinue therapy due to lack of appreciable progress towards goals  []Discontinue therapy due to lack of attendance or compliance  []Await Physician's recommendations/decisions regarding therapy  []Other:________________________________________________________________    Thank you for this referral.   Gila Deleon, PT 5/7/2018 11:03 AM    NOTE TO PHYSICIAN:  [address] [facility] [phone]  If you are unable to process this request in 24 hours please contact our office: [phone] I have read the above report and request that my patient continue as recommended. ? I have read the above report and request that my patient continue therapy with the following changes/special instructions:____________________________________  ? I have read the above report and request that my patient be discharged from therapy.     Physicians signature: ______________________________Date: ______Time:______

## 2018-05-07 NOTE — PROGRESS NOTES
PT DAILY TREATMENT NOTE     Patient Name: Steven Mercedes  Date:2018  : 1955  [x]  Patient  Verified  Payor: [place] MEDICARE / Plan: [place] MEDICARE PART A & B / Product Type: Medicare /    In time:11:00  Out time:11:55  Total Treatment Time (min): 55  Total Timed Codes: 30  1:1 Treatment Time: 40   Visit #: 8 of     Treatment Area: Pain in left knee [M25.562]  Other acute postprocedural pain [G89.18]    SUBJECTIVE  Pain Level (0-10 scale): 2/10  Any medication changes, allergies to medications, adverse drug reactions, diagnosis change, or new procedure performed?: [x] No    [] Yes (see summary sheet for update)  Subjective functional status/changes:   [] No changes reported  Pt reports that both of his lower legs have been feeling fidgety lately. He has had that for all of his life, but it's been a little worse for the last week and it's been keeping him awake. It's usually worse with more activity, such as after a lot of walking or playing sports in the past.  Denies increased activity, but has has stopped taking Oxycodone within the past week. His knee has stayed red the whole time since surgery. Denies calf pain, any increased swelling in his legs, or any constitutional sx. Pt reports 65% improvement with therapy. He would like to work on stairs, walking outside, and getting into/out of a car. He has never fallen, but he's had a few times when he's mis stepped outside and caught himself with the cane. He is using the cane outside but is no longer using the cane around the house. Pt reports the swelling and redness are getting better. Pt reports his Medicare benefits have been approved.        OBJECTIVE    Modality rationale: decrease inflammation and decrease pain to improve the patients ability to tolerate daily tasks    Min Type Additional Details    [] Estim:  []Unatt       []IFC  []Premod                        []Other:  []w/ice   []w/heat  Position:  Location:    [] Estim: []Att []TENS instruct  []NMES                    []Other:  []w/US   []w/ice   []w/heat  Position:  Location:    []  Traction: [] Cervical       []Lumbar                       [] Prone          []Supine                       []Intermittent   []Continuous Lbs:  [] before manual  [] after manual    []  Ultrasound: []Continuous   [] Pulsed                           []1MHz   []3MHz W/cm2:  Location:    []  Iontophoresis with dexamethasone         Location: [] Take home patch   [] In clinic    []  Ice     []  heat  []  Ice massage  []  Laser   []  Anodyne Position:  Location:    []  Laser with stim  []  Other:  Position:  Location:   15 [x]  Vasopneumatic Device Pressure:       [x] lo [] med [] hi   Temperature: [x] lo [] med [] hi   [x] Skin assessment post-treatment:  [x]intact []redness- no adverse reaction    []redness - adverse reaction:     10 minutes Re-Evaluation    28 min Therapeutic Exercise:  [x] See flow sheet :   Rationale: increase ROM and increase strength to improve the patients ability to improve ease of prolonged ambulation and stairs     2 minutes Manual: TPR left distal/lateral quad  Rationale: to improve tissue extensibility and reduce pain with ADLs            With   [] TE   [] TA   [] neuro   [] other: Patient Education: [x] Review HEP    [] Progressed/Changed HEP based on:   [] positioning   [] body mechanics   [] transfers   [] heat/ice application    [] other:      Other Objective/Functional Measures:     Left Knee Flexion AROM, PROM:  Extension: 4 dgrs, 2 dgrs  Flexion: 108 dgrs, 111 dgrs     MMT B Knees:  Flexion 4+/5 B  Extension: right 4+/5, 4/5 p! Minor pain over distal lateral quad with SLR and SAQ, trigger point palpable in distal vastus lateralis, addressed manually     FOTO 48    Observation of Left Knee:   Incision is healed with the exception of small scabbed area 1/4 of the way from the top (scab is smaller than a pencil eraser)  Redness noted around incision (medial > lateral) and anterior shin - pt reports redness is improving since surgery  Continues to have pitting edema in left lower leg, reduced edema since initial evaluation  No tenderness left calf  No increased temp left calf compared to right    Well's Clinical Prediction Rules - low risk of DVT  Unilateral pitting edema. (Points: 1 )   Alternative diagnosis as likely as or more likely than DVT. (Points: -2 )   Total points: -1 POINTS  Analysis:  Category: Low Risk (3%). Risk score interpretation: Low probability of DVT. Reviewed signs/sx of infection and instructed pt to monitor redness       Pain Level (0-10 scale) post treatment: 2-3/10 \"the same as when I came in\"     ASSESSMENT/Changes in Function: See Progress Note    Patient will continue to benefit from skilled PT services to modify and progress therapeutic interventions, address functional mobility deficits, address ROM deficits, address strength deficits, analyze and address soft tissue restrictions, analyze and cue movement patterns, assess and modify postural abnormalities, address imbalance/dizziness and instruct in home and community integration to attain remaining goals. []  See Plan of Care  [x]  See progress note/recertification  []  See Discharge Summary         Progress towards goals / Updated goals:  Short Term Goals: To be accomplished in 1 weeks:  1. Pt will be compliant with initial HEP to improve therapy outcomes   Goal met. 4/23/18  Long Term Goals: To be accomplished in 6 weeks:  1. Pt will improve FOTO by 16 points in order to demonstrate functional improvement Progressing. Improved 9 points 5/7/18  2. Pt will improve left knee flexion AROM to 115 dgrs in order to improve gait pattern and ease of work tasks  Progressing. 5/4/18  3. Pt will improve left knee MMT to 4+/5 in order to improve ease of work tasks  Progressing. See Above 5/7/18  4. Pt will improve standing/ambulatory tolerance to 15 minutes in order to improve ease of work tasks Goal met. Standing/ambulatory tolerance 15-20 minutes 5/7/18    PLAN  []  Upgrade activities as tolerated     [x]  Continue plan of care  []  Update interventions per flow sheet       []  Discharge due to:_  []  Other:_      Tommy Roldan, PT 5/7/2018  11:00 AM    Future Appointments  Date Time Provider Dex [place]   5/9/2018 11:00 AM Jamison Tavera PTA [facility]   5/10/2018 10:15 AM Edu Riojas PA-C [facility]   5/11/2018 10:30 AM Tommy Roldan, PT [facility]   5/14/2018 11:00 AM Jamison Tavera PTA [facility]   5/15/2018 3:30 PM Anny Reagan  [address]   5/16/2018 11:00 AM Jamison Tavera PTA [facility]   5/18/2018 11:00 AM Anai Raphael [facility]   5/30/2018 10:30 AM Rowan Munoz  [address] [place]

## 2018-05-08 PROCEDURE — 3331090001 HH PPS REVENUE CREDIT

## 2018-05-08 PROCEDURE — 3331090002 HH PPS REVENUE DEBIT

## 2018-05-09 ENCOUNTER — HOSPITAL ENCOUNTER (OUTPATIENT)
Dept: PHYSICAL THERAPY | Age: 63
Discharge: HOME | End: 2018-05-09
Payer: MEDICARE

## 2018-05-09 ENCOUNTER — OFFICE VISIT (OUTPATIENT)
Dept: ORTHOPEDIC SURGERY | Facility: CLINIC | Age: 63
End: 2018-05-09

## 2018-05-09 ENCOUNTER — HOSPITAL ENCOUNTER (OUTPATIENT)
Dept: LAB | Age: 63
Discharge: HOME | End: 2018-05-09
Payer: MEDICARE

## 2018-05-09 VITALS
TEMPERATURE: 98.2 F | DIASTOLIC BLOOD PRESSURE: 89 MMHG | OXYGEN SATURATION: 96 % | HEART RATE: 83 BPM | SYSTOLIC BLOOD PRESSURE: 147 MMHG

## 2018-05-09 DIAGNOSIS — G25.81 RESTLESS LEG SYNDROME: ICD-10-CM

## 2018-05-09 DIAGNOSIS — Z96.652 STATUS POST TOTAL LEFT KNEE REPLACEMENT: Primary | ICD-10-CM

## 2018-05-09 DIAGNOSIS — L03.116 CELLULITIS OF KNEE, LEFT: ICD-10-CM

## 2018-05-09 DIAGNOSIS — Z96.652 STATUS POST TOTAL LEFT KNEE REPLACEMENT: ICD-10-CM

## 2018-05-09 LAB
BASOPHILS # BLD: 0 K/UL (ref 0–0.06)
BASOPHILS NFR BLD: 1 % (ref 0–2)
CRP SERPL-MCNC: 0.7 MG/DL (ref 0–0.3)
DIFFERENTIAL METHOD BLD: ABNORMAL
EOSINOPHIL # BLD: 0.1 K/UL (ref 0–0.4)
EOSINOPHIL NFR BLD: 3 % (ref 0–5)
ERYTHROCYTE [DISTWIDTH] IN BLOOD BY AUTOMATED COUNT: 13.5 % (ref 11.6–14.5)
ERYTHROCYTE [SEDIMENTATION RATE] IN BLOOD: 14 MM/HR (ref 0–20)
HCT VFR BLD AUTO: 36 % (ref 36–48)
HGB BLD-MCNC: 12.3 G/DL (ref 13–16)
LYMPHOCYTES # BLD: 1.4 K/UL (ref 0.9–3.6)
LYMPHOCYTES NFR BLD: 26 % (ref 21–52)
MCH RBC QN AUTO: 31.1 PG (ref 24–34)
MCHC RBC AUTO-ENTMCNC: 34.2 G/DL (ref 31–37)
MCV RBC AUTO: 90.9 FL (ref 74–97)
MONOCYTES # BLD: 0.5 K/UL (ref 0.05–1.2)
MONOCYTES NFR BLD: 9 % (ref 3–10)
NEUTS SEG # BLD: 3.4 K/UL (ref 1.8–8)
NEUTS SEG NFR BLD: 61 % (ref 40–73)
PLATELET # BLD AUTO: 196 K/UL (ref 135–420)
PMV BLD AUTO: 10.8 FL (ref 9.2–11.8)
RBC # BLD AUTO: 3.96 M/UL (ref 4.7–5.5)
WBC # BLD AUTO: 5.5 K/UL (ref 4.6–13.2)

## 2018-05-09 PROCEDURE — 86140 C-REACTIVE PROTEIN: CPT | Performed by: PHYSICIAN ASSISTANT

## 2018-05-09 PROCEDURE — 85025 COMPLETE CBC W/AUTO DIFF WBC: CPT | Performed by: PHYSICIAN ASSISTANT

## 2018-05-09 PROCEDURE — 3331090002 HH PPS REVENUE DEBIT

## 2018-05-09 PROCEDURE — 85652 RBC SED RATE AUTOMATED: CPT | Performed by: PHYSICIAN ASSISTANT

## 2018-05-09 PROCEDURE — 3331090001 HH PPS REVENUE CREDIT

## 2018-05-09 PROCEDURE — 83520 IMMUNOASSAY QUANT NOS NONAB: CPT | Performed by: PHYSICIAN ASSISTANT

## 2018-05-09 PROCEDURE — 36415 COLL VENOUS BLD VENIPUNCTURE: CPT | Performed by: PHYSICIAN ASSISTANT

## 2018-05-09 RX ORDER — SULFAMETHOXAZOLE AND TRIMETHOPRIM 800; 160 MG/1; MG/1
1 TABLET ORAL 2 TIMES DAILY
Qty: 20 TAB | Refills: 0 | Status: SHIPPED | OUTPATIENT
Start: 2018-05-09 | End: 2018-05-30 | Stop reason: ALTCHOICE

## 2018-05-09 RX ORDER — ROPINIROLE 2 MG/1
2 TABLET, FILM COATED ORAL
Qty: 30 TAB | Refills: 0 | Status: SHIPPED | OUTPATIENT
Start: 2018-05-09 | End: 2018-05-15 | Stop reason: ALTCHOICE

## 2018-05-09 NOTE — PROGRESS NOTES
HISTORY:  Dmitri Riley presents to the office following recommendations from the physical therapist today. He is status post primary left total knee replacement. Date of surgery was March 27, 2018, under the care of Dr. Iron Curry. He has been seen on a couple of occasions since surgery by Johny Chang PA-C and Luz Saint, PA-C. The concerns for the visits were increased swelling with possible DVT in the left posterior calf and lower extremity. He has been studied twice via noninvasive and not found to have a DVT. He is currently on a fluid pill through his PCP, Dr. Lakshmi Marion. The presence of erythema and warmth today alerted the home therapist to refer him to our office for evaluation. The patients pain has improved overall to the left knee and his range of motion also has improved. He has no appreciable calf pain today. REVIEW OF SYSTEMS: He denies any fever, chills, or night sweats. No chest pain. Some exertional dyspnea, which is chronic in nature. The patient is completely off of his Oxycodone and he is currently managing his pain with Tylenol successfully. PHYSICAL EXAMINATION: The patient is a healthy-appearing, well-developed, well-nourished, pleasant, 59-year-old,  male, atraumatic, normocephalic, alert and oriented x 3, sitting on the table comfortably. He is joined by his wife today. Examination to the left knee anterior of the surface reveals a cranial/caudal oriented 20 cm surgical incision. The incision is healing nicely. There are scattered areas of scabbing noted. There is no evidence of wound breakdown, peyton, or any drainage. There is warmth of the lower third of the incision with, the distal pole proximal x 10 cm in height and length and 12 cm in width, an area of developing redness with warmth that does jen. There is no associated bruising in the area associated that is erythematous. The patients range of motion actively-he can easily attain 110? of flexion with -5? of extension. There is no instability on varus or valgus stressing. His patella does track midline. There is no evidence of hematoma or seroma. There are no effusions. He has 1+ pitting edema of the right lower distal tibia, to include the dorsum of the foot. RADIOGRAPHS: Three views of the left knee today well a well-seated total joint prosthesis with no evidence of periprosthetic fracturing or dislocation. IMPRESSION:  1. Ten weeks status post primary left total knee replacement, stable. 2. Left knee pain, generally improved. 3. Left knee range of motion, improving. 4. Early cellulitis associated with the anterior left knee bracketing the surgical site. PLAN: The patient was placed on Septra DS one po bid today. No evidence of calf tenderness. He had some swelling in the lower leg anteriorly to include the dorsum of the foot. Overall, 1+ pitting with improvement over the past several weeks. We will see him on Friday afternoon for wound check. The area of erythema was scribed today with a methylene blue marker and in a dotted fashion the area was also extended out roughly 2 cm from the scribed border. The patient was advised that if the redness develops to the point that it meets the dotted area, then he is to proceed immediately to the emergency room and/or of he develops extreme pain and/or any skin breakdown or weeping. For restless legs symptoms, he has been treated by Dr. Marquita Strong in the past with Requip. That has been about four or five years ago. He did feel like he had a little relief and requested a prescription today for Requip again. He was given such to take 2 mg at bedtime every three days and then if not successful with managing his symptoms increase to 4 mg at bedtime for three days. All of his questions and his wifes questions were answered to their satisfaction. A copy of the x-rays were reviewed and provided. Infectious labs were ordered, to include IL-6, sed.  rate, C-reactive protein, and a CBC. It is not my opinion that his infection is a deep intraarticular type but the presence of early cellulitis is noted and therefore dictates an aggressive treatment.

## 2018-05-10 PROCEDURE — 3331090001 HH PPS REVENUE CREDIT

## 2018-05-10 PROCEDURE — 3331090002 HH PPS REVENUE DEBIT

## 2018-05-11 ENCOUNTER — OFFICE VISIT (OUTPATIENT)
Dept: ORTHOPEDIC SURGERY | Facility: CLINIC | Age: 63
End: 2018-05-11

## 2018-05-11 ENCOUNTER — HOSPITAL ENCOUNTER (OUTPATIENT)
Dept: PHYSICAL THERAPY | Age: 63
Discharge: HOME | End: 2018-05-11
Payer: MEDICARE

## 2018-05-11 VITALS
DIASTOLIC BLOOD PRESSURE: 73 MMHG | RESPIRATION RATE: 18 BRPM | SYSTOLIC BLOOD PRESSURE: 132 MMHG | HEIGHT: 70 IN | WEIGHT: 222.4 LBS | BODY MASS INDEX: 31.84 KG/M2 | TEMPERATURE: 96.4 F | OXYGEN SATURATION: 96 % | HEART RATE: 85 BPM

## 2018-05-11 DIAGNOSIS — L03.116 CELLULITIS OF KNEE, LEFT: ICD-10-CM

## 2018-05-11 DIAGNOSIS — G89.18 POST-OP PAIN: Primary | ICD-10-CM

## 2018-05-11 PROCEDURE — 97110 THERAPEUTIC EXERCISES: CPT

## 2018-05-11 PROCEDURE — 3331090002 HH PPS REVENUE DEBIT

## 2018-05-11 PROCEDURE — 3331090001 HH PPS REVENUE CREDIT

## 2018-05-11 NOTE — PROGRESS NOTES
HISTORY:  Tina Stover returns. He was seen two days ago for early cellulitis associated with the anterior aspect of his left knee. He is status post recent left primary total knee replacement under the care of Dr. Shira Matute. Date of surgery was March 27, 2018. Labs ordered for infection rule-out today reveal CBC: WBC 5.5, sed. rate 14, c-reactive protein 0.7, IL-6 pending. He is on antibiotics currently taking Sulfa twice daily. PHYSICAL EXAMINATION: His redness over the anteromedial aspect of the left knee has improved significantly. The redness has withdrawn from the scribed methylene blue marker line placed two days ago. The patient has established and maintained his range of motion at 105? - 5? today. He has essentially no pain to the left knee and I do not believe there is any deep infection intraarticular. PLAN: He is going to continue his antibiotics. He will see Felicia MARROQUIN next week for followup. IL-6 value is still pending. The patient and his wifes questions were answered to their satisfaction. A copy of the infection labs provided to the patient today.

## 2018-05-11 NOTE — PROGRESS NOTES
PT DAILY TREATMENT NOTE - [facility]     Patient Name: Jarod Villalobos  Date:2018  : 1955  [x]  Patient  Verified  Payor: [place] MEDICARE / Plan: [place] MEDICARE PART A & B / Product Type: Medicare /    In time:10:33  Out time:11:10  Total Treatment Time (min): 37  Total Timed Codes (min): 27  1:1 Treatment Time ( only): 27   Visit #: 9     Treatment Area: Pain in left knee [M25.562]  Other acute postprocedural pain [G89.18]    SUBJECTIVE  Pain Level (0-10 scale): 2/10  Any medication changes, allergies to medications, adverse drug reactions, diagnosis change, or new procedure performed?: [x] No    [] Yes (see summary sheet for update)  Subjective functional status/changes:   [] No changes reported  Pt reports that he saw his MD this morning who diagnosed with him cellulitis. His MD told him to come to therapy today and to continue therapy as scheduled. He had blood work that came back normal, and he has been on the antibiotics since Wednesday. He cannot remember the name of the antibiotic. His MD eufemia a line on his skin and instructed pt to go to the ER if redness spreads beyond that line.       OBJECTIVE    Modality rationale: decrease inflammation and decrease pain to improve the patients ability to tolerate daily tasks   Min Type Additional Details    [] Estim:  []Unatt       []IFC  []Premod                        []Other:  []w/ice   []w/heat  Position:  Location:    [] Estim: []Att    []TENS instruct  []NMES                    []Other:  []w/US   []w/ice   []w/heat  Position:  Location:    []  Traction: [] Cervical       []Lumbar                       [] Prone          []Supine                       []Intermittent   []Continuous Lbs:  [] before manual  [] after manual    []  Ultrasound: []Continuous   [] Pulsed                           []1MHz   []3MHz W/cm2:  Location:    []  Iontophoresis with dexamethasone         Location: [] Take home patch   [] In clinic   10 [x]  Ice     []  heat  [] Ice massage  []  Laser   []  Anodyne Position: supine with HOB elevated and wedge under BLE  Location: left knee     []  Laser with stim  []  Other:  Position:  Location:    []  Vasopneumatic Device Pressure:       [] lo [] med [] hi   Temperature: [] lo [] med [] hi   [x] Skin assessment post-treatment:  [x]intact []redness- no adverse reaction    []redness - adverse reaction:       27 min Therapeutic Exercise:  [x] See flow sheet :   Rationale: increase ROM and increase strength to improve the patients ability to improve ease of ambulation             With   [] TE   [] TA   [] neuro   [] other: Patient Education: [x] Review HEP    [] Progressed/Changed HEP based on:   [] positioning   [] body mechanics   [] transfers   [] heat/ice application    [] other:      Other Objective/Functional Measures:     Held vaso d/t infection   Continues to demonstrate quad lag with SLR, but quad lag is decreasing. Terminal knee extension is also limited by ROM deficits during SLR    Decreased redness left knee, continues to have increased temp anterior left knee. No increased pain with interventions     No increased pain with therapy       Pain Level (0-10 scale) post treatment: 2/10    ASSESSMENT/Changes in Function:     Pt is making steady progress towards therapy goals. He was diagnosed with cellulitis and Game Ready was held d/t infection. Redness has decreased and pain levels are declining. Initiated long sit hamstring stretch and incline gastroc stretch to address muscular flexibility deficits contributing to lack of terminal knee extension. Will continue to address strength and flexibility deficits for improved ease of ambulation and return to PLOF.         Patient will continue to benefit from skilled PT services to modify and progress therapeutic interventions, address functional mobility deficits, address ROM deficits, address strength deficits, analyze and address soft tissue restrictions, analyze and cue movement patterns, assess and modify postural abnormalities, address imbalance/dizziness and instruct in home and community integration to attain remaining goals. Progress towards goals / Updated goals:  1. Pt will improve FOTO by 16 points in order to demonstrate functional improvement  2. Pt will improve left knee flexion AROM to 115 dgrs in order to improve gait pattern and ease of work tasks  3. Pt will improve left knee MMT to 5/5 in order to improve ease of work tasks    4.  Pt will report no difficulty with car transfers in order to perform work tasks    PLAN  []  Upgrade activities as tolerated     []  Continue plan of care  []  Update interventions per flow sheet       []  Discharge due to:_  []  Other:_      Car Delcid, PT 5/11/2018  10:39 AM    Future Appointments  Date Time Provider Dex [place]   5/14/2018 11:00 AM Britton Martinez PTA [facility]   5/15/2018 3:30 PM Henok Zepeda  [address]   5/16/2018 11:00 AM Britton Martinez PTA [facility]   5/17/2018 8:30 AM ESPERANZA Horn [address]   5/18/2018 11:00 AM Anai Molina Gone [facility]   5/30/2018 10:30 AM Susan Wallis  [address] [place]

## 2018-05-12 PROCEDURE — 3331090002 HH PPS REVENUE DEBIT

## 2018-05-12 PROCEDURE — 3331090001 HH PPS REVENUE CREDIT

## 2018-05-13 PROCEDURE — 3331090002 HH PPS REVENUE DEBIT

## 2018-05-13 PROCEDURE — 3331090001 HH PPS REVENUE CREDIT

## 2018-05-14 ENCOUNTER — HOSPITAL ENCOUNTER (OUTPATIENT)
Dept: PHYSICAL THERAPY | Age: 63
Discharge: HOME | End: 2018-05-14
Payer: MEDICARE

## 2018-05-14 PROCEDURE — 3331090001 HH PPS REVENUE CREDIT

## 2018-05-14 PROCEDURE — 3331090002 HH PPS REVENUE DEBIT

## 2018-05-14 PROCEDURE — 97110 THERAPEUTIC EXERCISES: CPT

## 2018-05-14 NOTE — PROGRESS NOTES
PT DAILY TREATMENT NOTE - [facility]     Patient Name: Antonia Cristina  Date:2018  : 1955  [x]  Patient  Verified  Payor: [place] MEDICARE / Plan: VA MEDICARE PART A & B / Product Type: Medicare /    In time:1102  Out time:1146  Total Treatment Time (min): 44  Total Timed Codes (min): 34  1:1 Treatment Time ( only): 34  Visit #: 10     Treatment Area: Pain in left knee [M25.562]  Other acute postprocedural pain [G89.18]    SUBJECTIVE  Pain Level (0-10 scale): 2-3/10  Any medication changes, allergies to medications, adverse drug reactions, diagnosis change, or new procedure performed?: [x] No    [] Yes (see summary sheet for update)  Subjective functional status/changes:   [] No changes reported  Pt stated that his knee is coming along.  He is taking his antibiotics as prescribed    OBJECTIVE    Modality rationale: decrease inflammation and decrease pain to improve the patients ability to increase ease with ADLs   Min Type Additional Details    [] Estim:  []Unatt       []IFC  []Premod                        []Other:  []w/ice   []w/heat  Position:  Location:    [] Estim: []Att    []TENS instruct  []NMES                    []Other:  []w/US   []w/ice   []w/heat  Position:  Location:    []  Traction: [] Cervical       []Lumbar                       [] Prone          []Supine                       []Intermittent   []Continuous Lbs:  [] before manual  [] after manual    []  Ultrasound: []Continuous   [] Pulsed                           []1MHz   []3MHz W/cm2:  Location:    []  Iontophoresis with dexamethasone         Location: [] Take home patch   [] In clinic   10 [x]  Ice     []  heat  []  Ice massage  []  Laser   []  Anodyne Position:  Location:    []  Laser with stim  []  Other:  Position:  Location:    []  Vasopneumatic Device Pressure:       [] lo [] med [] hi   Temperature: [] lo [] med [] hi   [] Skin assessment post-treatment:  []intact []redness- no adverse reaction    []redness - adverse reaction:     34 min Therapeutic Exercise:  [x] See flow sheet :   Rationale: increase ROM and increase strength to improve the patients ability to incresae ease with ADLs    With   [x] TE   [] TA   [] neuro   [] other: Patient Education: [x] Review HEP    [] Progressed/Changed HEP based on:   [] positioning   [] body mechanics   [] transfers   [] heat/ice application    [] other:      Other Objective/Functional Measures:   Had no complaint of increased pain during session  No difficulty with exercises  Was unable to perform 4\" eccentric step downs 2* increased pain, but was able to perform with 2\" step  Pt was challenged with new exercises     Pain Level (0-10 scale) post treatment: 2/10    ASSESSMENT/Changes in Function:   Pt is progressing well toward goals. Pt reports increased ease with bending his knee. Cont with decreased strength and range of motion in left knee. Pt reports increased ease with walking and performing ADLs    Patient will continue to benefit from skilled PT services to modify and progress therapeutic interventions, address functional mobility deficits, address ROM deficits and address strength deficits to attain remaining goals. []  See Plan of Care  [x]  See progress note/recertification  []  See Discharge Summary         Progress towards goals / Updated goals:  1. Pt will improve FOTO by 16 points in order to demonstrate functional improvement  2. Pt will improve left knee flexion AROM to 115 dgrs in order to improve gait pattern and ease of work tasks  3. Pt will improve left knee MMT to 5/5 in order to improve ease of work tasks    4.  Pt will report no difficulty with car transfers in order to perform work tasks    PLAN  []  Upgrade activities as tolerated     [x]  Continue plan of care  []  Update interventions per flow sheet       []  Discharge due to:_  []  Other:_      Jamison Tavera, BIRGIT 5/14/2018  11:09 AM    Future Appointments  Date Time Provider Dex Hatfield 5/15/2018 3:30 PM Ragena Mcardle,  [address]   5/16/2018 11:00 AM Eamon Owusu PTA [facility]   5/17/2018 8:30 AM Jonne Baumgarten, PA-C [facility]   5/18/2018 11:00 AM Anai Whitney [facility]   5/30/2018 10:30 AM Julien Andre  [address] [place]

## 2018-05-15 ENCOUNTER — OFFICE VISIT (OUTPATIENT)
Dept: ORTHOPEDIC SURGERY | Age: 63
End: 2018-05-15

## 2018-05-15 VITALS
HEART RATE: 86 BPM | WEIGHT: 221 LBS | OXYGEN SATURATION: 96 % | BODY MASS INDEX: 31.64 KG/M2 | DIASTOLIC BLOOD PRESSURE: 72 MMHG | RESPIRATION RATE: 14 BRPM | SYSTOLIC BLOOD PRESSURE: 118 MMHG | HEIGHT: 70 IN | TEMPERATURE: 97.2 F

## 2018-05-15 DIAGNOSIS — Z79.01 WARFARIN ANTICOAGULATION: ICD-10-CM

## 2018-05-15 DIAGNOSIS — M47.816 LUMBAR SPONDYLOSIS: ICD-10-CM

## 2018-05-15 DIAGNOSIS — Z96.652 S/P TOTAL KNEE REPLACEMENT, LEFT: ICD-10-CM

## 2018-05-15 DIAGNOSIS — M51.36 DDD (DEGENERATIVE DISC DISEASE), LUMBAR: Primary | ICD-10-CM

## 2018-05-15 DIAGNOSIS — M62.838 MUSCLE SPASM: ICD-10-CM

## 2018-05-15 LAB — IL6 SERPL-MCNC: 1.7 PG/ML (ref 0–15.5)

## 2018-05-15 PROCEDURE — 3331090002 HH PPS REVENUE DEBIT

## 2018-05-15 PROCEDURE — 3331090001 HH PPS REVENUE CREDIT

## 2018-05-15 NOTE — PROGRESS NOTES
[facility]  [address] [place], [address]  Phone: [phone]  Fax: [phone]    Pt's YOB: 1955    ASSESSMENT   Diagnoses and all orders for this visit:    1. DDD (degenerative disc disease), lumbar    2. Lumbar spondylosis    3. Muscle spasm    4. Warfarin anticoagulation    5. S/P total knee replacement, left         IMPRESSION AND PLAN:  Lissy Mooney is a 70 y.o. male with history of lumbar pain. He experiences lower back pain upon waking but this generally improves within 30 minutes. Pt had a left total knee replacement with Dr. Katarzyna Solis 7 weeks ago and is currently in physical therapy at [facility] 2 x a week. 1) Pt was given information on lumbar arthritis exercises. 2) He is not a candidate for NSAID's due to anticoagulation with Coumadin. 3) Pt may use his previously prescribed Medrol Dosepak. 4) Mr. Ginna Morgan has a reminder for a \"due or due soon\" health maintenance. I have asked that he contact his primary care provider, Hilario Benítez MD, for follow-up on this health maintenance. 5)  demonstrated consistency with prescribing. 6) Pt will follow-up in 3 months or sooner if needed. HISTORY OF PRESENT ILLNESS:  Lissy Mooney is a 70 y.o. male with history of lumbar pain. He experiences lower back pain upon waking but this generally improves within 30 minutes. Pt denies any change in symptoms since his last office visit and generally experiences 2-3/10 pain. He had a left total knee replacement with Dr. Katarzyna Solis 7 weeks ago. Pt states that he continues to experience some pain in the left knee but he recently started driving again. He experienced some swelling in the left leg after the surgery so he had to wait to start physical therapy. Pt is currently in physical therapy at [facility] 2 x a week. Of note, he is currently on Coumadin. He has a left over Medrol Dosepak. Pt takes Skelaxin 800 mg 1 tab QHS. He took Roxicodone after his knee replacement but experienced constipation with the medication. Pt was then prescribed Norco but states that he does not take the medication. He has been using Tylenol Extra Strength as needed with benefit. Pt had cellulitis in the left knee last week and is currently on antibiotics. He notes that he has discussed having a right knee replacement but he experienced relief with steroid injections. Pt has also discussed having left rotator cuff repair. He has limited range of motion in the left shoulder and experiences frequent left shoulder pain. Pt at this time desires to continue with current care. He is accompanied by his wife during today's office visit. Pain Scale: 2/10    PCP: Shanell Dnucan MD     Past Medical History:   Diagnosis Date    Arthritis     Bleeding     Blood clot in the legs     Chronic pain     knee and shoulder    Esophageal reflux     GERD (gastroesophageal reflux disease)     Headache(784.0)     migraine    High cholesterol     Hypertension     Lower back pain 11/6/2010    Other chest pain     Personal history of venous thrombosis and embolism     Pure hypercholesterolemia     Right buttock pain 11/6/2010    Right foot pain     Rotator cuff tear     left-since 2010, worsened tear Jan.    Spinal stenosis     Tendonitis, tibialis     anterior    Thromboembolus ([facility] [address]     3 after sx last one 2000        Social History     Social History    Marital status:      Spouse name: N/A    Number of children: N/A    Years of education: N/A     Occupational History    Not on file.      Social History Main Topics    Smoking status: Never Smoker    Smokeless tobacco: Never Used    Alcohol use No    Drug use: No    Sexual activity: Not Currently     Other Topics Concern    Not on file     Social History Narrative       Current Outpatient Prescriptions   Medication Sig Dispense Refill    trimethoprim-sulfamethoxazole (BACTRIM DS, SEPTRA DS) 160-800 mg per tablet Take 1 Tab by mouth two (2) times a day. Indications: Skin and Skin Structure Infection 20 Tab 0    warfarin (COUMADIN) 5 mg tablet TAKE 2 AND 1/2 TABLETS BY MOUTH DAILY OR AS DIRECTED 75 Tab 0    warfarin (COUMADIN) 3 mg tablet Or as directed 30 Tab 3    raNITIdine (ZANTAC) 150 mg tablet Take 150 mg by mouth nightly. 5    tamsulosin (FLOMAX) 0.4 mg capsule Take 1 Cap by mouth daily. 30 Cap 2    celecoxib (CELEBREX) 200 mg capsule Take 1 Cap by mouth two (2) times a day for 90 days. 60 Cap 2    lisinopril-hydroCHLOROthiazide (PRINZIDE, ZESTORETIC) 20-12.5 mg per tablet TAKE 1 TABLET BY MOUTH DAILY 90 Tab 0    labetalol (NORMODYNE) 100 mg tablet TAKE ONE TABLET BY MOUTH TWICE DAILY (Patient taking differently: TAKE ONE TABLET BY MOUTH DAILY) 180 Tab 0    azelastine (ASTELIN) 137 mcg (0.1 %) nasal spray 1 spray up each nostril twice per day (Patient taking differently: 1 Spray by Both Nostrils route daily. Using once per day) 1 Bottle 1    metaxalone (SKELAXIN) 800 mg tablet Take 1 Tab by mouth three (3) times daily. Indications: Muscle Spasm (Patient taking differently: Take 800 mg by mouth three (3) times daily as needed. Indications: Muscle Spasm) 90 Tab 2    montelukast (SINGULAIR) 10 mg tablet TAKE 1 TABLET BY MOUTH EVERY DAY (Patient taking differently: TAKE 1 TABLET BY MOUTH EVERY HS) 90 Tab 0    butalbital-acetaminophen-caff (FIORICET) -40 mg per capsule Take 2 capsules every 8 hours as needed for headache 540 Cap 3    lansoprazole (PREVACID) 30 mg capsule TAKE 1 CAPSULE DAILY BEFOREBREAKFAST 90 Cap 3    acetaminophen (TYLENOL) 500 mg tablet Take 1,000 mg by mouth every six (6) hours as needed for Pain.  furosemide (LASIX) 20 mg tablet 2 tablets every other day 30 Tab 1    HYDROcodone-acetaminophen (NORCO)  mg tablet Take 1 Tab by mouth every eight (8) hours as needed for Pain. Max Daily Amount: 3 Tabs.  40 Tab 0    oxyCODONE IR (ROXICODONE) 15 mg immediate release tablet Take 1 Tab by mouth every six (6) hours as needed for Pain. Max Daily Amount: 60 mg. 28 Tab 0       Allergies   Allergen Reactions    Augmentin [Amoxicillin-Pot Clavulanate] Itching    Hibiclens [Chlorhexidine Gluconate] Itching    Penicillins Rash         REVIEW OF SYSTEMS    Constitutional: Negative for fever, chills, or weight change. Respiratory: Negative for cough or shortness of breath. Cardiovascular: Negative for chest pain or palpitations. Gastrointestinal: Negative for acid reflux, change in bowel habits, or constipation. Genitourinary: Negative for dysuria and flank pain. Musculoskeletal: Positive for lumbar pain. Skin: Negative for rash. Neurological: Negative for headaches, dizziness, or numbness. Endo/Heme/Allergies: Negative for increased bruising. Psychiatric/Behavioral: Negative for difficulty with sleep. PHYSICAL EXAMINATION  Visit Vitals    /72    Pulse 86    Temp 97.2 °F (36.2 °C) (Oral)    Resp 14    Ht 5' 10\" (1.778 m)    Wt 221 lb (100.2 kg)    SpO2 96%    BMI 31.71 kg/m2       Constitutional: Awake, alert, and in no acute distress. Neurological: 1+ symmetrical DTRs in the lower extremities. Sensation to light touch is intact. Skin: warm, dry, and intact; well healed left knee surgical incision without erythema, drainage or warmth  Musculoskeletal: Tenderness to palpation in the lower lumbar region. Moderate pain with extension and axial loading. No pain with internal or external rotation of his hips. Negative straight leg raise bilaterally. Patient ambulates with the assistance of a single point cane.      Hip Flex  Quads Hamstrings Ankle DF EHL Ankle PF   Right +4/5 +4/5 +4/5 +4/5 +4/5 +4/5   Left +4/5 +4/5 +4/5 +4/5 +4/5 +4/5     IMAGING:    Lumbar spine MRI from 01/24/2018 was personally reviewed with the patient and demonstrated:  Results from [facility] on 01/24/18   MRI LUMB SPINE W WO CONT    Narrative MR lumbar spine with and without contrast    CPT CODE: 52076    HISTORY: Chronic and worsening low back pain with left lower extremity pain. Increasing weakness. Remote history of surgeries. No recent injury. COMPARISON: MRI January 2013. TECHNIQUE: Lumbar spine scanned with axial and sagittal T1W scans, axial and  sagittal T2W scans, and with post gadolinium axial and sagittal T1W scans. Contrast used: 10 cc Gadavist.    FINDINGS:     Prior laminotomies on the left at L4-5 and bilaterally at L5-S1. No fracture,  bone destruction, or fluid collection. Intact lordosis. Normal vertebral body heights. Small less than 5 mm low signal  focus within anterior L4, developed since 2013. No similar focus elsewhere. No  aggressive features. Otherwise generally fatty marrow signal with some chronic  fatty endplate changes straddling L5-S1. Moderate to severe disc space narrowing  and disc desiccation of that level. Mild to moderate narrowing and desiccation  of L3-4 and L4-5. Conus at T12-L1. Axial imaging correlation:    T12-L1:  Patent canal and foramina. L1-L2: Patent canal and foramina. L2-L3: Patent canal and foramina. L3-L4: Broad-based disc osteophyte complex. Bilateral facet arthropathy with  ligamentum flavum thickening and buckling. Moderate concentric spinal stenosis. Particular narrowing of lateral recesses with transient distortion of the  crossing L4 nerves. Axial T2 image 20. Patent foramina. Progression of  degenerative findings. L4-L5: Postoperative level. Mild broad-based disc osteophyte complex with a  small amount of enhancing scar tissue. Hypertrophic facet arthropathy. Moderate  spinal stenosis. Narrowing of the lateral recesses left more than right. Transient distortion of the crossing nerves particularly left L5. Axial T2 image  13. Mild foraminal stenoses. Unchanged. L5-S1: Postoperative level. Broad-based disc osteophyte complex with enhancing  scar tissue centrally. Hypertrophic facet arthropathy. No significant spinal  stenosis. Moderately severe bilateral foraminal stenoses. Unchanged. Incidental imaging of regional soft tissues unremarkable.               Impression IMPRESSION:    1. Some progression of degenerative disc and facet disease at L3-4, with  moderate spinal stenosis and potentially impingement of the crossing L4 nerves,  left more than right. 2. Stable postsurgical and degenerative findings at L4-5 and L5-S1.         Left tib/fib x-rays from 01/19/2018 demonstrated:  Results from Hospital Encounter on 01/19/18   XR TIB/FIB LT     Narrative LEFT TIBIA/FIBULA, TWO VIEWS    CPT CODE: 21935    INDICATION: Left lower leg pain x1 week, no known trauma    COMPARISON: None    TECHNIQUE: AP and lateral radiographs of the left tibia and fibula are reviewed.  ]     FINDINGS:    There is no evidence of acute osseous injury.  No radiographic evidence of  significant localized soft tissue swelling is seen. Mild degenerative changes at the knee with mild joint space narrowing best seen  at the medial compartment and very small periarticular osteophytosis best seen  along the posterior superior patella. Chondrocalcinosis at the knee, which is not specific but often seen in the  setting of CPPD. Tiny calcific appearing densities project  inferior to the patella on lateral  view, overlying the soft tissues medial to the distal tibia on AP image, and  anterior to the proximal to mid tibia on lateral projection, nonspecific.              Impression Impression:     No evidence of acute fracture. Chondrocalcinosis. Mild degenerative changes at the left knee.   Few small scattered soft tissue calcifications noted.                 Bilateral knee x-rays from 03/14/2017 demonstrated:  Medial compartment narrowing bilaterally, L>R      Cervical Spine MRI from 12/19/2016 demonstrated:  Results from [facility] on 12/19/16   MRI CERV SPINE WO CONT     Narrative MRI of cervical spine without contrast    HISTORY: Chronic progressive neck pain with headaches. COMPARISON: No prior MRI. Cervical spine radiographs from 12/1/2016. TECHNIQUE: T1 weighted, T2 FSE, FSE inversion recovery sagittal images are  supplemented by T2 weighted and GRE/medic axial images. FINDINGS: Normal alignment and vertebral body heights. Normal marrow signal  except for mild endplate degenerative change. Cervical cord is normal in signal  and caliber. Visualized posterior fossa contents look normal. Paraspinal soft  tissues look normal.    C2-3: No significant degenerative disc disease or spinal stenosis. C3-4: Small nonfocal disc protrusion which contacts the ventral cord and causes  borderline spinal stenosis. No foraminal stenosis. C4-5: No significant degenerative disc disease. Mildly hypertrophic facets. No  spinal stenosis. C5-6: Disc is narrowed with diffusely bulging disc and osteophyte complex. Facets are mildly hypertrophic. Mild spinal canal and moderate left foraminal  stenoses. C6-7: Disc is narrowed with diffusely bulging disc and osteophyte complex. Facets are mildly hypertrophic. Mild spinal canal and moderate bilateral  foraminal stenoses. C7-T1: No significant degenerative disc disease or spinal stenosis.               Impression Impression:    Disc osteophyte complexes causing mild spinal canal stenoses at C5-6 and C6-7. Moderate left foraminal stenosis at C5-6 and moderate bilateral foraminal  stenoses at C6-7.      Written by Cherie Morel, as dictated by Kulwant Hawkins MD.  I, Dr. Kulwant Hawkins confirm that all documentation is accurate.

## 2018-05-15 NOTE — PATIENT INSTRUCTIONS

## 2018-05-15 NOTE — MR AVS SNAPSHOT
[address] [facility] [address] [place] [address] 
[phone] Patient: Brenda Moura MRN: [id number] HDP:6/31/3524 Visit Information Date & Time Provider Department Dept. Phone Encounter #  
 5/15/2018  3:30 PM Champ Rice MD [facility] [phone] [id number] Follow-up Instructions Return in about 3 months (around 8/15/2018) for Medication follow up. Your Appointments 5/17/2018  8:30 AM  
Follow Up with Dave Frankel, PA-C  
[facility] [address] [place] [address]) Appt Note: 1 wk f/u lt knee swelling [address], [address] [place] [address]  
[phone]  
  
   
 [address], [address] [address] 5/30/2018 10:30 AM  
Follow Up with Mallory Raya MD  
[facility]) Appt Note: 1 mo f/u  
 [address], [address] [place] [address]  
  
   
 [address], [address] [facility] [address] Upcoming Health Maintenance Date Due Hepatitis C Screening 1955 ZOSTER VACCINE AGE 60> 2/13/2013 GLAUCOMA SCREENING Q2Y 4/13/2018 Pneumococcal 65+ Low/Medium Risk (1 of 2 - PCV13) 4/13/2018 MEDICARE YEARLY EXAM 5/2/2018 Influenza Age 5 to Adult 8/1/2018 DTaP/Tdap/Td series (2 - Td) 12/6/2024 COLONOSCOPY 5/27/2026 Allergies as of 5/15/2018  Review Complete On: 5/15/2018 By: Champ Rice MD  
  
 Severity Noted Reaction Type Reactions Augmentin [Amoxicillin-pot Clavulanate]    Itching Hibiclens [Chlorhexidine Gluconate]  03/16/2018    Itching Penicillins    Rash Current Immunizations  Reviewed on 3/16/2018 Name Date Tdap 12/6/2014 Not reviewed this visit You Were Diagnosed With   
  
 Codes Comments Facet arthropathy, lumbar ([facility] [address]    -  Primary ICD-10-CM: M46.96 
ICD-9-CM: 721.3 DDD (degenerative disc disease), lumbar     ICD-10-CM: M51.36 
ICD-9-CM: 722.52 Muscle spasm     ICD-10-CM: S09.151 ICD-9-CM: 728.85 Warfarin anticoagulation     ICD-10-CM: Z79.01 
ICD-9-CM: V58.61 S/P total knee replacement, left     ICD-10-CM: C67.563 ICD-9-CM: V43.65 Vitals BP Pulse Temp Resp Height(growth percentile) Weight(growth percentile) 118/72 86 97.2 °F (36.2 °C) (Oral) 14 5' 10\" (1.778 m) 221 lb (100.2 kg) SpO2 BMI Smoking Status 96% 31.71 kg/m2 Never Smoker Vitals History BMI and BSA Data Body Mass Index Body Surface Area 31.71 kg/m 2 2.22 m 2 Preferred Pharmacy Pharmacy Name Phone [facility] [address] [phone] Your Updated Medication List  
  
   
This list is accurate as of 5/15/18  4:51 PM.  Always use your most recent med list.  
  
  
  
  
 acetaminophen 500 mg tablet Commonly known as:  TYLENOL Take 1,000 mg by mouth every six (6) hours as needed for Pain. azelastine 137 mcg (0.1 %) nasal spray Commonly known as:  ASTELIN  
1 spray up each nostril twice per day  
  
 butalbital-acetaminophen-caff -40 mg per capsule Commonly known as:  [employer] Take 2 capsules every 8 hours as needed for headache  
  
 celecoxib 200 mg capsule Commonly known as:  CELEBREX Take 1 Cap by mouth two (2) times a day for 90 days. furosemide 20 mg tablet Commonly known as:  LASIX  
2 tablets every other day HYDROcodone-acetaminophen  mg tablet Commonly known as:  Owen Shonda Take 1 Tab by mouth every eight (8) hours as needed for Pain. Max Daily Amount: 3 Tabs. labetalol 100 mg tablet Commonly known as:  NORMODYNE  
TAKE ONE TABLET BY MOUTH TWICE DAILY  
  
 lansoprazole 30 mg capsule Commonly known as:  PREVACID TAKE 1 CAPSULE DAILY BEFOREBREAKFAST  
  
 lisinopril-hydroCHLOROthiazide 20-12.5 mg per tablet Commonly known as:  PRINZIDE, ZESTORETIC  
TAKE 1 TABLET BY MOUTH DAILY  
  
 metaxalone 800 mg tablet Commonly known as:  SKELAXIN Take 1 Tab by mouth three (3) times daily. Indications: Muscle Spasm  
  
 montelukast 10 mg tablet Commonly known as:  SINGULAIR  
TAKE 1 TABLET BY MOUTH EVERY DAY  
  
 oxyCODONE IR 15 mg immediate release tablet Commonly known as:  [place] [employer] Take 1 Tab by mouth every six (6) hours as needed for Pain. Max Daily Amount: 60 mg.  
  
 raNITIdine 150 mg tablet Commonly known as:  ZANTAC Take 150 mg by mouth nightly. tamsulosin 0.4 mg capsule Commonly known as:  FLOMAX Take 1 Cap by mouth daily. trimethoprim-sulfamethoxazole 160-800 mg per tablet Commonly known as:  BACTRIM DS, SEPTRA DS Take 1 Tab by mouth two (2) times a day. Indications: Skin and Skin Structure Infection * warfarin 5 mg tablet Commonly known as:  COUMADIN  
TAKE 2 AND 1/2 TABLETS BY MOUTH DAILY OR AS DIRECTED * warfarin 3 mg tablet Commonly known as:  COUMADIN Or as directed * Notice: This list has 2 medication(s) that are the same as other medications prescribed for you. Read the directions carefully, and ask your doctor or other care provider to review them with you. Follow-up Instructions Return in about 3 months (around 8/15/2018) for Medication follow up. To-Do List   
 05/18/2018 11:30 AM  
  Appointment with Muna Pillai PT at [facility] [address] [facility] ([phone]) Patient Instructions Low Back Arthritis: Exercises Your Care Instructions Here are some examples of typical rehabilitation exercises for your condition. Start each exercise slowly. Ease off the exercise if you start to have pain. Your doctor or physical therapist will tell you when you can start these exercises and which ones will work best for you.  
When you are not being active, find a comfortable position for rest. Some people are comfortable on the floor or a medium-firm bed with a small pillow under their head and another under their knees. Some people prefer to lie on their side with a pillow between their knees. Don't stay in one position for too long. Take short walks (10 to 20 minutes) every 2 to 3 hours. Avoid slopes, hills, and stairs until you feel better. Walk only distances you can manage without pain, especially leg pain. How to do the exercises Pelvic tilt 1. Lie on your back with your knees bent. 2. \"Brace\" your stomach-tighten your muscles by pulling in and imagining your belly button moving toward your spine. 3. Press your lower back into the floor. You should feel your hips and pelvis rock back. 4. Hold for 6 seconds while breathing smoothly. 5. Relax and allow your pelvis and hips to rock forward. 6. Repeat 8 to 12 times. Back stretches 1. Get down on your hands and knees on the floor. 2. Relax your head and allow it to droop. Round your back up toward the ceiling until you feel a nice stretch in your upper, middle, and lower back. Hold this stretch for as long as it feels comfortable, or about 15 to 30 seconds. 3. Return to the starting position with a flat back while you are on your hands and knees. 4. Let your back sway by pressing your stomach toward the floor. Lift your buttocks toward the ceiling. 5. Hold this position for 15 to 30 seconds. 6. Repeat 2 to 4 times. Follow-up care is a key part of your treatment and safety. Be sure to make and go to all appointments, and call your doctor if you are having problems. It's also a good idea to know your test results and keep a list of the medicines you take. Where can you learn more? Go to http://rivka-jarrell.info/. Enter J923 in the search box to learn more about \"Low Back Arthritis: Exercises. \" Current as of: March 21, 2017 Content Version: 11.4 © 6896-5823 [employer].  Care instructions adapted under license by [address] (which disclaims liability or warranty for this information). If you have questions about a medical condition or this instruction, always ask your healthcare professional. [address] any warranty or liability for your use of this information. Please provide this summary of care documentation to your next provider. Your primary care clinician is listed as Pradeep Jiang. If you have any questions after today's visit, please call [phone].

## 2018-05-16 ENCOUNTER — APPOINTMENT (OUTPATIENT)
Dept: PHYSICAL THERAPY | Age: 63
End: 2018-05-16
Payer: MEDICARE

## 2018-05-16 PROCEDURE — 3331090001 HH PPS REVENUE CREDIT

## 2018-05-16 PROCEDURE — 3331090002 HH PPS REVENUE DEBIT

## 2018-05-17 ENCOUNTER — ANTI-COAG VISIT (OUTPATIENT)
Dept: INTERNAL MEDICINE CLINIC | Age: 63
End: 2018-05-17

## 2018-05-17 ENCOUNTER — OFFICE VISIT (OUTPATIENT)
Dept: ORTHOPEDIC SURGERY | Facility: CLINIC | Age: 63
End: 2018-05-17

## 2018-05-17 VITALS
HEART RATE: 77 BPM | DIASTOLIC BLOOD PRESSURE: 69 MMHG | SYSTOLIC BLOOD PRESSURE: 128 MMHG | RESPIRATION RATE: 16 BRPM | WEIGHT: 218.6 LBS | BODY MASS INDEX: 31.3 KG/M2 | OXYGEN SATURATION: 97 % | TEMPERATURE: 96.1 F | HEIGHT: 70 IN

## 2018-05-17 DIAGNOSIS — Z86.718 PERSONAL HISTORY OF VENOUS THROMBOSIS AND EMBOLISM: ICD-10-CM

## 2018-05-17 DIAGNOSIS — Z96.652 STATUS POST TOTAL LEFT KNEE REPLACEMENT: Primary | ICD-10-CM

## 2018-05-17 DIAGNOSIS — Z79.01 WARFARIN ANTICOAGULATION: ICD-10-CM

## 2018-05-17 LAB
INR BLD: 2.8
PT POC: NORMAL SECONDS
VALID INTERNAL CONTROL?: YES

## 2018-05-17 PROCEDURE — 3331090002 HH PPS REVENUE DEBIT

## 2018-05-17 PROCEDURE — 3331090001 HH PPS REVENUE CREDIT

## 2018-05-17 NOTE — PROGRESS NOTES
[address] [facility], [address] [facility]  [phone]           Patient: Opal Lew                MRN: [id number]       SSN: [id number]  YOB: 1955        AGE: 70 y.o. SEX: male  Body mass index is 31.37 kg/(m^2). PCP: Todd Rojo MD  05/17/18      This office note has been dictated. REVIEW OF SYSTEMS:  Constitutional: Negative for fever, chills, weight loss and malaise/fatigue. HENT: Negative. Eyes: Negative. Respiratory: Negative. Cardiovascular: Negative. Gastrointestinal: No bowel incontinence or constipation. Genitourinary: No bladder incontinence or saddle anesthesia. Skin: Negative. Neurological: Negative. Endo/Heme/Allergies: Negative. Psychiatric/Behavioral: Negative. Musculoskeletal: As per HPI above.      Past Medical History:   Diagnosis Date    Arthritis     Bleeding     Blood clot in the legs     Chronic pain     knee and shoulder    Esophageal reflux     GERD (gastroesophageal reflux disease)     Headache(784.0)     migraine    High cholesterol     Hypertension     Lower back pain 11/6/2010    Other chest pain     Personal history of venous thrombosis and embolism     Pure hypercholesterolemia     Right buttock pain 11/6/2010    Right foot pain     Rotator cuff tear     left-since 2010, worsened tear Young.    Spinal stenosis     Tendonitis, tibialis     anterior    Thromboembolus ([facility] [address]     3 after sx last one 2000         Current Outpatient Prescriptions:     furosemide (LASIX) 20 mg tablet, 2 tablets every other day, Disp: 30 Tab, Rfl: 1    warfarin (COUMADIN) 5 mg tablet, TAKE 2 AND 1/2 TABLETS BY MOUTH DAILY OR AS DIRECTED, Disp: 75 Tab, Rfl: 0    warfarin (COUMADIN) 3 mg tablet, Or as directed, Disp: 30 Tab, Rfl: 3    raNITIdine (ZANTAC) 150 mg tablet, Take 150 mg by mouth nightly., Disp: , Rfl: 5    celecoxib (CELEBREX) 200 mg capsule, Take 1 Cap by mouth two (2) times a day for 90 days. , Disp: 60 Cap, Rfl: 2    lisinopril-hydroCHLOROthiazide (PRINZIDE, ZESTORETIC) 20-12.5 mg per tablet, TAKE 1 TABLET BY MOUTH DAILY, Disp: 90 Tab, Rfl: 0    labetalol (NORMODYNE) 100 mg tablet, TAKE ONE TABLET BY MOUTH TWICE DAILY (Patient taking differently: TAKE ONE TABLET BY MOUTH DAILY), Disp: 180 Tab, Rfl: 0    azelastine (ASTELIN) 137 mcg (0.1 %) nasal spray, 1 spray up each nostril twice per day (Patient taking differently: 1 Spray by Both Nostrils route daily. Using once per day), Disp: 1 Bottle, Rfl: 1    metaxalone (SKELAXIN) 800 mg tablet, Take 1 Tab by mouth three (3) times daily. Indications: Muscle Spasm (Patient taking differently: Take 800 mg by mouth three (3) times daily as needed. Indications: Muscle Spasm), Disp: 90 Tab, Rfl: 2    montelukast (SINGULAIR) 10 mg tablet, TAKE 1 TABLET BY MOUTH EVERY DAY (Patient taking differently: TAKE 1 TABLET BY MOUTH EVERY HS), Disp: 90 Tab, Rfl: 0    butalbital-acetaminophen-caff (FIORICET) -40 mg per capsule, Take 2 capsules every 8 hours as needed for headache, Disp: 540 Cap, Rfl: 3    lansoprazole (PREVACID) 30 mg capsule, TAKE 1 CAPSULE DAILY BEFOREBREAKFAST, Disp: 90 Cap, Rfl: 3    acetaminophen (TYLENOL) 500 mg tablet, Take 1,000 mg by mouth every six (6) hours as needed for Pain., Disp: , Rfl:     trimethoprim-sulfamethoxazole (BACTRIM DS, SEPTRA DS) 160-800 mg per tablet, Take 1 Tab by mouth two (2) times a day. Indications: Skin and Skin Structure Infection, Disp: 20 Tab, Rfl: 0    HYDROcodone-acetaminophen (NORCO)  mg tablet, Take 1 Tab by mouth every eight (8) hours as needed for Pain. Max Daily Amount: 3 Tabs., Disp: 40 Tab, Rfl: 0    tamsulosin (FLOMAX) 0.4 mg capsule, Take 1 Cap by mouth daily. , Disp: 30 Cap, Rfl: 2    oxyCODONE IR (ROXICODONE) 15 mg immediate release tablet, Take 1 Tab by mouth every six (6) hours as needed for Pain.  Max Daily Amount: 60 mg., Disp: 28 Tab, Rfl: 0    Allergies   Allergen Reactions    Augmentin [Amoxicillin-Pot Clavulanate] Itching    Hibiclens [Chlorhexidine Gluconate] Itching    Penicillins Rash       Social History     Social History    Marital status:      Spouse name: N/A    Number of children: N/A    Years of education: N/A     Occupational History    Not on file. Social History Main Topics    Smoking status: Never Smoker    Smokeless tobacco: Never Used    Alcohol use No    Drug use: No    Sexual activity: Not Currently     Other Topics Concern    Not on file     Social History Narrative       Past Surgical History:   Procedure Laterality Date    FOOT/TOES SURGERY PROC UNLISTED      HX BACK SURGERY      HX ORTHOPAEDIC  06-25-12    Right foot with excision of bursa and adipose tissue from fifth metatarsal base by Dr. Presley [place]      lower back (1992 and 2000)    HX OTHER SURGICAL      left foot (2008)    LAMINOTOMY      NERVE BLOCK             We did see Mr. Marta Barakat for followup with regards to his left total knee replacement. The patient is now seven weeks status post surgery. He has been progressing nicely. He did have some swelling early on. Fortunately, he had a negative DVT study. He was given some fluid pills, and this did improve. He recently developed a little cellulitis in his knee treated by Mr. Carina Glasgow. The erythema has resolved. He denies any fevers or chills. He has had no night sweats. He does continue physical therapy without complications. He is working on range of motion activities on his own. PHYSICAL EXAMINATION:  In general, the patient is alert and oriented x 3 in no acute distress. The patient is well-developed, well-nourished, with a normal affect. The patient is afebrile. HEENT:  Head is normocephalic and atraumatic. Pupils are equally round and reactive to light and accommodation. Extraocular eye movements are intact. Neck is supple. Trachea is midline. No JVD is present. Breathing is nonlabored. Examination of the left knee reveals the skin is intact. The surgical wounds are healed nicely. There is no erythema, no ecchymosis, no warmth, and no signs of infection or cellulitis present. Range of motion is full extension to 110° of flexion. The patella tracks nicely without rubs or crepitus noted. RADIOGRAPHS:  Radiographs reviewed, AP, lateral, and skyline, show total knee components are well-fixed without evidence of loosening or fracture noted. LABORATORY STUDIES:   Review of previous labs shows an IL-6 of 1.7. CRP was 0.7. White count was 5.5, and sedimentation rate was 14. ASSESSMENT:      1. Status post left knee replacement. 2. Cellulitis resolved. PLAN:  At this point, the patient will continue physical therapy. A new prescription was given today. We discussed some treatments for leg cramps and restless leg. He was given ReQuip, which he cannot handle the side effects. We talked about Co-Enzyme Q-10, as well as tonic water. We will see him back in the office in about four weeks time for evaluation. He will call with any questions or concerns that shall arise.                   JR Willie GATES, ESPERANZA, ATC

## 2018-05-18 ENCOUNTER — HOSPITAL ENCOUNTER (OUTPATIENT)
Dept: PHYSICAL THERAPY | Age: 63
Discharge: HOME | End: 2018-05-18
Payer: MEDICARE

## 2018-05-18 PROCEDURE — 97110 THERAPEUTIC EXERCISES: CPT

## 2018-05-18 PROCEDURE — 3331090002 HH PPS REVENUE DEBIT

## 2018-05-18 PROCEDURE — 3331090001 HH PPS REVENUE CREDIT

## 2018-05-18 NOTE — PROGRESS NOTES
PT DAILY TREATMENT NOTE - [facility]     Patient Name: Amy Gupta  Date:2018  : 1955  [x]  Patient  Verified  Payor: [place] MEDICARE / Plan: [place] MEDICARE PART A & B / Product Type: Medicare /    In time:11:29  Out time:12:15  Total Treatment Time (min): 46  Total Timed Codes (min): 46  1:1 Treatment Time ( [address] only): 36   Visit #: 1 of     Treatment Area: Pain in left knee [M25.562]  Other acute postprocedural pain [G89.18]    SUBJECTIVE  Pain Level (0-10 scale): 2-3/10  Any medication changes, allergies to medications, adverse drug reactions, diagnosis change, or new procedure performed?: [x] No    [] Yes (see summary sheet for update)  Subjective functional status/changes:   [] No changes reported  Pt reports doing okay today with some minor tightness and a \"stretching\" feeling in the left knee. Pt had a follow up with doctor on 2018 who said things look good and that the incision site looks good. Pt is finishing up dose of antibiotics for an infection last week, but doctor said the infection has resolved. Pt reports compliance with HEP. OBJECTIVE    Modality rationale: decrease inflammation and decrease pain to improve the patients ability to walk more easily.     Min Type Additional Details    [] Estim:  []Unatt       []IFC  []Premod                        []Other:  []w/ice   []w/heat  Position:  Location:    [] Estim: []Att    []TENS instruct  []NMES                    []Other:  []w/US   []w/ice   []w/heat  Position:  Location:    []  Traction: [] Cervical       []Lumbar                       [] Prone          []Supine                       []Intermittent   []Continuous Lbs:  [] before manual  [] after manual    []  Ultrasound: []Continuous   [] Pulsed                           []1MHz   []3MHz W/cm2:  Location:    []  Iontophoresis with dexamethasone         Location: [] Take home patch   [] In clinic   10 [x]  Ice     []  heat  []  Ice massage  []  Laser   []  Anodyne Position: supine with knees elevated  Location: left knee    []  Laser with stim  []  Other:  Position:  Location:    []  Vasopneumatic Device Pressure:       [] lo [] med [] hi   Temperature: [] lo [] med [] hi   [x] Skin assessment post-treatment:  []intact [x]redness- no adverse reaction    []redness - adverse reaction:     36 min Therapeutic Exercise:  [x] See flow sheet :   Rationale: increase ROM, increase strength and improve balance to improve the patients ability to achieve normal gait pattern and negotiate stairs. With   [x] TE   [] TA   [] neuro   [] other: Patient Education: [x] Review HEP    [] Progressed/Changed HEP based on:   [] positioning   [] body mechanics   [] transfers   [] heat/ice application    [] other:      Other Objective/Functional Measures:   Left knee AROM flexion: 0-110  Pt performed step-ups, mini squats, and Romberg foam exercises without difficulty, so progression next visit would be beneficial.   Pt tolerated adding resistance to glute exercises and hamstring curls well. Pt struggled with eccentric step-downs due to weakness. Pain Level (0-10 scale) post treatment: 2/10    ASSESSMENT/Changes in Function:   Pt is slowly progressing toward goals. Pt seems to be improving range of motion without too much pain, but still needs to work on rebuilding strength. Pt should be progressed with balance activities next visit to increase difficulty. Pt still walks with a slight limp, but doesn't use SPC much when walking short distances. Patient will continue to benefit from skilled PT services to modify and progress therapeutic interventions, address functional mobility deficits, address ROM deficits, address strength deficits and analyze and address soft tissue restrictions to attain remaining goals. Progress towards goals / Updated goals:  1. Pt will improve FOTO by 16 points in order to demonstrate functional improvement  2.  Pt will improve left knee flexion AROM to 115 dgrs in order to improve gait pattern and ease of work tasks. Progressin dgrs 18  3. Pt will improve left knee MMT to 5/5 in order to improve ease of work tasks    4.  Pt will report no difficulty with car transfers in order to perform work tasks    PLAN  [x]  Upgrade activities as tolerated     [x]  Continue plan of care  []  Update interventions per flow sheet       []  Discharge due to:_  []  Other:_      Samina Dowling 2018  12:53 PM    Future Appointments  Date Time Provider Dex Alysia   2018 8:30 AM Yu Mooring, PT [facility]CPTP[facility]   2018 1:00 PM Jade Horsfall, PTA [facility]   2018 10:30 AM Jade Horsfall, PTA [facility]   2018 10:30 AM Annette Stallworth  [address] [place] [address]   2018 9:00 AM Yu Mooring, PT [facility]   2018 11:00 AM Jade Horsfall, PTA [id number] [facility]   2018 10:30 AM Jade Horsfall, PTA [facility]   2018 10:30 AM Yu Mooring, PT [id number] [facility]   2018 11:00 AM Jade Horsfall, PTA [id number] [facility]   2018 11:00 AM Jade Horsfall, PTA [facility]   2018 9:15 AM Scarlet Medina PA-C [facility] ROXANNE SCHED   6/15/2018 10:30 AM Yu Mooring, PT [id number] [facility]   2018 3:30 PM Zeenat Bustamante  [address]

## 2018-05-19 PROCEDURE — 3331090001 HH PPS REVENUE CREDIT

## 2018-05-19 PROCEDURE — 3331090002 HH PPS REVENUE DEBIT

## 2018-05-19 RX ORDER — MONTELUKAST SODIUM 10 MG/1
TABLET ORAL
Qty: 90 TAB | Refills: 0 | Status: SHIPPED | OUTPATIENT
Start: 2018-05-19 | End: 2018-05-30 | Stop reason: ALTCHOICE

## 2018-05-20 PROCEDURE — 3331090002 HH PPS REVENUE DEBIT

## 2018-05-20 PROCEDURE — 3331090001 HH PPS REVENUE CREDIT

## 2018-05-21 PROCEDURE — 3331090001 HH PPS REVENUE CREDIT

## 2018-05-21 PROCEDURE — 3331090002 HH PPS REVENUE DEBIT

## 2018-05-21 NOTE — PROGRESS NOTES
Mr. Roger Harris is here today for anticoagulation monitoring for the diagnosis of DVT. His INR goal is 2.0-3.0 and his current Coumadin dose is patient reported 8 mg Mon, Wed, and Fri then 10 mg all other days. Today's findings include an INR of 2.8 (normal INR range 0.8-1.2). Considering Mr. Winters's past history, todays findings, and per the coumadin policy/protocol, Mr. Madison Birch was instructed to take Coumadin as follows,  8 mg Mon, Wed, and Fri then 10 mg all other days. He was also instructed to schedule an appointment in 3 weeks prior to leaving for an INR check. A full discussion of the nature of anticoagulants has been carried out. A full discussion of the need for frequent and regular monitoring, precise dosage adjustment and compliance was stressed. Side effects of potential bleeding were discussed and Mr. Madison Birch was instructed to call [phone] if there are any signs of abnormal bleeding. Mr. Madison Birch was instructed to avoid any OTC items containing aspirin or ibuprofen and prior to starting any new OTC products to consult with his physician or pharmacist to ensure no drug interactions are present. Mr. Madison Birch was instructed to avoid any major changes in his general diet and to avoid alcohol consumption. .      Mr. Madison Birch verbalized his understanding of all instructions and will call the office with any questions, concerns, or signs of abnormal bleeding or blood clot.

## 2018-05-22 ENCOUNTER — HOSPITAL ENCOUNTER (OUTPATIENT)
Dept: PHYSICAL THERAPY | Age: 63
Discharge: HOME | End: 2018-05-22
Payer: MEDICARE

## 2018-05-22 PROCEDURE — 97110 THERAPEUTIC EXERCISES: CPT

## 2018-05-22 PROCEDURE — 3331090002 HH PPS REVENUE DEBIT

## 2018-05-22 PROCEDURE — 3331090001 HH PPS REVENUE CREDIT

## 2018-05-22 NOTE — PROGRESS NOTES
PT DAILY TREATMENT NOTE - [facility]     Patient Name: Antonia Cristina  Date:2018  : 1955  [x]  Patient  Verified  Payor: [place] MEDICARE / Plan: VA MEDICARE PART A & B / Product Type: Medicare /    In time: 8:30  Out time: 9:15  Total Treatment Time (min): 45  Total Timed Codes (min): 35  1:1 Treatment Time ( only): 30   Visit #: 2 of     Treatment Area: Pain in left knee [M25.562]  Other acute postprocedural pain [G89.18]    SUBJECTIVE  Pain Level (0-10 scale): 3/10  Any medication changes, allergies to medications, adverse drug reactions, diagnosis change, or new procedure performed?: [x] No    [] Yes (see summary sheet for update)  Subjective functional status/changes:   [] No changes reported  Pt.  Reports he is a little sore today from being up on his feet more    OBJECTIVE    Modality rationale: decrease inflammation and decrease pain to improve the patients ability to increase ease of ADLs   Min Type Additional Details    [] Estim:  []Unatt       []IFC  []Premod                        []Other:  []w/ice   []w/heat  Position:  Location:    [] Estim: []Att    []TENS instruct  []NMES                    []Other:  []w/US   []w/ice   []w/heat  Position:  Location:    []  Traction: [] Cervical       []Lumbar                       [] Prone          []Supine                       []Intermittent   []Continuous Lbs:  [] before manual  [] after manual    []  Ultrasound: []Continuous   [] Pulsed                           []1MHz   []3MHz W/cm2:  Location:    []  Iontophoresis with dexamethasone         Location: [] Take home patch   [] In clinic   10 [x]  Ice     []  heat  []  Ice massage  []  Laser   []  Anodyne Position: supine  Location: left knee    []  Laser with stim  []  Other:  Position:  Location:    []  Vasopneumatic Device Pressure:       [] lo [] med [] hi   Temperature: [] lo [] med [] hi   [x] Skin assessment post-treatment:  [x]intact []redness- no adverse reaction    []redness - adverse reaction:     30 min Therapeutic Exercise:  [x] See flow sheet :   Rationale: increase ROM, increase strength and improve balance to improve the patients ability to increase ease of ambulation           With   [x] TE   [] TA   [] neuro   [] other: Patient Education: [x] Review HEP    [] Progressed/Changed HEP based on:   [] positioning   [] body mechanics   [] transfers   [] heat/ice application    [] other:      Other Objective/Functional Measures:   Pt. Was challenged with 10# knee extension machine with double leg  Pt. Tolerated PT well but had some increased pain with knee extensions    He had good control with 2 inch eccentric step downs    Pain Level (0-10 scale) post treatment:  2/10    ASSESSMENT/Changes in Function:  Pt. Is progressing well with physical therapy. He continues to have decreased knee flexion AROM and decreased left knee strength which is affecting his ambulation. He continues to use a [facility] for community ambulation     Patient will continue to benefit from skilled PT services to modify and progress therapeutic interventions, address functional mobility deficits, address ROM deficits, address strength deficits, analyze and address soft tissue restrictions, analyze and cue movement patterns, analyze and modify body mechanics/ergonomics and assess and modify postural abnormalities to attain remaining goals. Progress towards goals / Updated goals:  1. Pt will improve FOTO by 16 points in order to demonstrate functional improvement  2. Pt will improve left knee flexion AROM to 115 dgrs in order to improve gait pattern and ease of work tasks. Progressin dgrs 18  3. Pt will improve left knee MMT to 5/5 in order to improve ease of work tasks    4.  Pt will report no difficulty with car transfers in order to perform work tasks    PLAN  []  Upgrade activities as tolerated     [x]  Continue plan of care  []  Update interventions per flow sheet       []  Discharge due to:_  []  Other:_ Sofi Lundberg, PT 5/22/2018  8:35 AM    Future Appointments  Date Time Provider Dex [place]   5/25/2018 1:00 PM Bacilio Arch, [place] [facility]   5/29/2018 10:30 AM Bacilio Arch, PTA [facility]   5/30/2018 10:30 AM Raciel Soto  [address] [place]   5/31/2018 9:00 AM Sofi Lundberg PT [facility]   6/5/2018 11:00 AM Bacilio Arch, PTA [facility]   6/7/2018 10:30 AM Bacilio Arch, PTA [facility]   6/8/2018 10:30 AM Sofi Lundberg, PT [id number] [facility]   6/11/2018 11:00 AM Bacilio Arch, PTA [id number] [facility]   6/13/2018 11:00 AM Bacilio Arch, PTA [facility]   6/14/2018 9:15 AM Jaime Chavez PA-C [facility]   6/15/2018 10:30 AM Sofi Lundberg, PT [id number] [facility]   8/20/2018 3:30 PM Jakob Chen  [address]

## 2018-05-23 PROCEDURE — 3331090001 HH PPS REVENUE CREDIT

## 2018-05-23 PROCEDURE — 3331090002 HH PPS REVENUE DEBIT

## 2018-05-24 ENCOUNTER — HOSPITAL ENCOUNTER (OUTPATIENT)
Dept: LAB | Age: 63
Discharge: HOME | End: 2018-05-24
Payer: MEDICARE

## 2018-05-24 DIAGNOSIS — Z12.5 PROSTATE CANCER SCREENING: ICD-10-CM

## 2018-05-24 DIAGNOSIS — Z79.01 WARFARIN ANTICOAGULATION: ICD-10-CM

## 2018-05-24 DIAGNOSIS — I10 ESSENTIAL HYPERTENSION: ICD-10-CM

## 2018-05-24 DIAGNOSIS — R60.0 LEG EDEMA, LEFT: ICD-10-CM

## 2018-05-24 LAB
ALBUMIN SERPL-MCNC: 3.5 G/DL (ref 3.4–5)
ALBUMIN/GLOB SERPL: 1.4 {RATIO} (ref 0.8–1.7)
ALP SERPL-CCNC: 68 U/L (ref 45–117)
ALT SERPL-CCNC: 24 U/L (ref 16–61)
ANION GAP SERPL CALC-SCNC: 8 MMOL/L (ref 3–18)
AST SERPL-CCNC: 14 U/L (ref 15–37)
BASOPHILS # BLD: 0 K/UL (ref 0–0.1)
BASOPHILS NFR BLD: 1 % (ref 0–2)
BILIRUB SERPL-MCNC: 0.2 MG/DL (ref 0.2–1)
BUN SERPL-MCNC: 17 MG/DL (ref 7–18)
BUN/CREAT SERPL: 17 (ref 12–20)
CALCIUM SERPL-MCNC: 8.8 MG/DL (ref 8.5–10.1)
CHLORIDE SERPL-SCNC: 106 MMOL/L (ref 100–108)
CO2 SERPL-SCNC: 29 MMOL/L (ref 21–32)
CREAT SERPL-MCNC: 1.03 MG/DL (ref 0.6–1.3)
DIFFERENTIAL METHOD BLD: ABNORMAL
EOSINOPHIL # BLD: 0.2 K/UL (ref 0–0.4)
EOSINOPHIL NFR BLD: 3 % (ref 0–5)
ERYTHROCYTE [DISTWIDTH] IN BLOOD BY AUTOMATED COUNT: 13.5 % (ref 11.6–14.5)
GLOBULIN SER CALC-MCNC: 2.5 G/DL (ref 2–4)
GLUCOSE SERPL-MCNC: 110 MG/DL (ref 74–99)
HCT VFR BLD AUTO: 36.9 % (ref 36–48)
HGB BLD-MCNC: 12.4 G/DL (ref 13–16)
INR PPP: 2.7 (ref 0.8–1.2)
LYMPHOCYTES # BLD: 1.3 K/UL (ref 0.9–3.6)
LYMPHOCYTES NFR BLD: 23 % (ref 21–52)
MCH RBC QN AUTO: 30.6 PG (ref 24–34)
MCHC RBC AUTO-ENTMCNC: 33.6 G/DL (ref 31–37)
MCV RBC AUTO: 91.1 FL (ref 74–97)
MONOCYTES # BLD: 0.6 K/UL (ref 0.05–1.2)
MONOCYTES NFR BLD: 11 % (ref 3–10)
NEUTS SEG # BLD: 3.4 K/UL (ref 1.8–8)
NEUTS SEG NFR BLD: 62 % (ref 40–73)
PLATELET # BLD AUTO: 185 K/UL (ref 135–420)
PMV BLD AUTO: 10.7 FL (ref 9.2–11.8)
POTASSIUM SERPL-SCNC: 3.8 MMOL/L (ref 3.5–5.5)
PROT SERPL-MCNC: 6 G/DL (ref 6.4–8.2)
PROTHROMBIN TIME: 28.1 SEC (ref 11.5–15.2)
PSA SERPL-MCNC: 5.3 NG/ML (ref 0–4)
RBC # BLD AUTO: 4.05 M/UL (ref 4.7–5.5)
SODIUM SERPL-SCNC: 143 MMOL/L (ref 136–145)
WBC # BLD AUTO: 5.5 K/UL (ref 4.6–13.2)

## 2018-05-24 PROCEDURE — 80053 COMPREHEN METABOLIC PANEL: CPT | Performed by: INTERNAL MEDICINE

## 2018-05-24 PROCEDURE — 85610 PROTHROMBIN TIME: CPT | Performed by: INTERNAL MEDICINE

## 2018-05-24 PROCEDURE — 84153 ASSAY OF PSA TOTAL: CPT | Performed by: INTERNAL MEDICINE

## 2018-05-24 PROCEDURE — 85025 COMPLETE CBC W/AUTO DIFF WBC: CPT | Performed by: INTERNAL MEDICINE

## 2018-05-24 PROCEDURE — 36415 COLL VENOUS BLD VENIPUNCTURE: CPT | Performed by: INTERNAL MEDICINE

## 2018-05-24 PROCEDURE — 3331090001 HH PPS REVENUE CREDIT

## 2018-05-24 PROCEDURE — 3331090002 HH PPS REVENUE DEBIT

## 2018-05-25 ENCOUNTER — HOSPITAL ENCOUNTER (OUTPATIENT)
Dept: PHYSICAL THERAPY | Age: 63
Discharge: HOME | End: 2018-05-25
Payer: MEDICARE

## 2018-05-25 PROCEDURE — 97110 THERAPEUTIC EXERCISES: CPT

## 2018-05-25 PROCEDURE — 3331090002 HH PPS REVENUE DEBIT

## 2018-05-25 PROCEDURE — 3331090001 HH PPS REVENUE CREDIT

## 2018-05-25 NOTE — PROGRESS NOTES
PT DAILY TREATMENT NOTE - [facility]     Patient Name: Mattie Toth  Date:2018  : 1955  [x]  Patient  Verified  Payor: Elois Schlatter / Plan:  [facility] / Product Type: PPO /    In time: 1:00  Out time:1:55  Total Treatment Time (min): 55  Total Timed Codes (min): 45  1:1 Treatment Time ( only): 39  Visit #: 3 of     Treatment Area: Pain in left knee [M25.562]  Other acute postprocedural pain [G89.18]    SUBJECTIVE  Pain Level (0-10 scale): 3/10  Any medication changes, allergies to medications, adverse drug reactions, diagnosis change, or new procedure performed?: [x] No    [] Yes (see summary sheet for update)  Subjective functional status/changes:   [] No changes reported  Pt reports he has not yet returned to work but is hoping to in . He has to be able to get in and out of all different types of vehicles and do more standing/walking. He is still not fully confident walking outside without his cane. He is still going up stairs one step at a time and also needs to work on getting up and down from the floor.    OBJECTIVE    Modality rationale: decrease inflammation and decrease pain to improve the patients ability to ambulate   Min Type Additional Details    [] Estim:  []Unatt       []IFC  []Premod                        []Other:  []w/ice   []w/heat  Position:  Location:    [] Estim: []Att    []TENS instruct  []NMES                    []Other:  []w/US   []w/ice   []w/heat  Position:  Location:    []  Traction: [] Cervical       []Lumbar                       [] Prone          []Supine                       []Intermittent   []Continuous Lbs:  [] before manual  [] after manual    []  Ultrasound: []Continuous   [] Pulsed                           []1MHz   []3MHz W/cm2:  Location:    []  Iontophoresis with dexamethasone         Location: [] Take home patch   [] In clinic   10 [x]  Ice     []  heat  []  Ice massage  []  Laser   []  Anodyne Position: supine  Location: left knee    [] Laser with stim  []  Other:  Position:  Location:    []  Vasopneumatic Device Pressure:       [] lo [] med [] hi   Temperature: [] lo [] med [] hi   [x] Skin assessment post-treatment:  [x]intact []redness- no adverse reaction    []redness - adverse reaction:     40 min Therapeutic Exercise:  [x] See flow sheet :   Rationale: increase ROM, increase strength, improve coordination, improve balance and increase proprioception to improve the patients ability to ambulate with decreased pain    5 minutes of manual for mobilizations with movement into flexion and extension to improve ease of bending/straightening his knee for car transfers          With   [] TE   [] TA   [] neuro   [] other: Patient Education: [x] Review HEP    [] Progressed/Changed HEP based on:   [] positioning   [] body mechanics   [] transfers   [] heat/ice application    [] other:      Other Objective/Functional Measures:   Provided with information to look into Silver Sneakers  Cues for QS first with SLR that pt is performing at home  Provided with GTB for strengthening at home  Able to perform sit to stands without UE assist  TC to complete leg machines   Left knee flexion before manual 110 degrees; left knee flexion after manual 116 degrees    Pain Level (0-10 scale) post treatment: 2/10    ASSESSMENT/Changes in Function: Pt is making steady progress towards updated goals and is motivated to return to work hopefully in June. He continues to require [facility] for outdoor ambulation due to decreased trust in left knee. He does demonstrate some left quad weakness that needs improvement in order to negotiate stairs reciprocally. Pt also needs training on floor to stand transfers in order to be able to get down on his knees for household chores. Will continue with progressing functional activities for return to OF.     Patient will continue to benefit from skilled PT services to modify and progress therapeutic interventions, address functional mobility deficits, address ROM deficits, address strength deficits, analyze and address soft tissue restrictions, analyze and cue movement patterns, analyze and modify body mechanics/ergonomics, assess and modify postural abnormalities, address imbalance/dizziness and instruct in home and community integration to attain remaining goals. Progress towards goals / Updated goals:  1. Pt will improve FOTO by 16 points in order to demonstrate functional improvement  2. Pt will improve left knee flexion AROM to 115 dgrs in order to improve gait pattern and ease of work tasks. Progressin dgrs 18 116 degrees post manual (18)  3. Pt will improve left knee MMT to / in order to improve ease of work tasks    4.  Pt will report no difficulty with car transfers in order to perform work tasks progressing some difficulty on 's side ()    PLAN  [x]  Upgrade activities as tolerated     [x]  Continue plan of care  []  Update interventions per flow sheet       []  Discharge due to:_  []  Other:_      Gurpreet Ground, PTA 2018  10:16 AM    Future Appointments  Date Time Provider Dex Alysia   2018 1:00 PM [place] Ground, PTA MMCPTPB [facility]   2018 10:30 AM Gurpreet Ground, PTA [facility]   2018 10:30 AM Lori Fleming  [address] ,  [address] [place] [address]   2018 9:00 AM [place] Slater, PT [facility]CP[facility]   2018 11:00 AM [place] Ground, PTA UVCVBND [facility]   2018 10:30 AM [place] Ground, PTA [facility]CP[facility]   2018 10:30 AM [place] Slater, PT [id number] [facility]   2018 11:00 AM [place] Ground, PTA WOWSUHS [facility]   2018 11:00 AM Gurpreet Ground, PTA [facility]CPTP[facility]   2018 9:15 AM Alan Morel PA-C [facility]   6/15/2018 10:30 AM GALILEO Wright [facility]   2018 3:30 PM Elvis Ortega  [address]

## 2018-05-26 PROCEDURE — 3331090001 HH PPS REVENUE CREDIT

## 2018-05-26 PROCEDURE — 3331090002 HH PPS REVENUE DEBIT

## 2018-05-27 PROCEDURE — 3331090001 HH PPS REVENUE CREDIT

## 2018-05-27 PROCEDURE — 3331090002 HH PPS REVENUE DEBIT

## 2018-05-28 PROCEDURE — 3331090002 HH PPS REVENUE DEBIT

## 2018-05-28 PROCEDURE — 3331090001 HH PPS REVENUE CREDIT

## 2018-05-29 ENCOUNTER — HOSPITAL ENCOUNTER (OUTPATIENT)
Dept: PHYSICAL THERAPY | Age: 63
Discharge: HOME | End: 2018-05-29
Payer: MEDICARE

## 2018-05-29 PROCEDURE — 97110 THERAPEUTIC EXERCISES: CPT

## 2018-05-29 PROCEDURE — 3331090001 HH PPS REVENUE CREDIT

## 2018-05-29 PROCEDURE — 3331090002 HH PPS REVENUE DEBIT

## 2018-05-29 NOTE — PROGRESS NOTES
PT DAILY TREATMENT NOTE - [facility]     Patient Name: Haroldo Werner  Date:2018  : 1955  [x]  Patient  Verified  Payor: BLUE CROSS / Plan: [employer] [facility] [place] / Product Type: PPO /    In time: 10:30  Out time:11:15  Total Treatment Time (min): 45  Total Timed Codes (min): 35  1:1 Treatment Time ( only): 30   Visit #: 4 of     Treatment Area: Pain in left knee [M25.562]  Other acute postprocedural pain [G89.18]    SUBJECTIVE  Pain Level (0-10 scale): 2/10  Any medication changes, allergies to medications, adverse drug reactions, diagnosis change, or new procedure performed?: [x] No    [] Yes (see summary sheet for update)  Subjective functional status/changes:   [] No changes reported  Pt reports slight sharp pain when standing up from the couch over the weekend. He still has difficulty with stairs (goes one step at a time). He needs to be able to climb up into higher vehicles for return to work; he notes it is easy getting in with the right but would have difficulty with the left. He is also having some trouble lifting his left leg to wash his foot in the shower.     OBJECTIVE    Modality rationale: decrease inflammation and decrease pain to improve the patients ability to improve ease of ambulation   Min Type Additional Details    [] Estim:  []Unatt       []IFC  []Premod                        []Other:  []w/ice   []w/heat  Position:  Location:    [] Estim: []Att    []TENS instruct  []NMES                    []Other:  []w/US   []w/ice   []w/heat  Position:  Location:    []  Traction: [] Cervical       []Lumbar                       [] Prone          []Supine                       []Intermittent   []Continuous Lbs:  [] before manual  [] after manual    []  Ultrasound: []Continuous   [] Pulsed                           []1MHz   []3MHz W/cm2:  Location:    []  Iontophoresis with dexamethasone         Location: [] Take home patch   [] In clinic   10 [x]  Ice     []  heat  []  Ice massage  [] Laser   []  Anodyne Position: supine HOB elevated  Location: left knee    []  Laser with stim  []  Other:  Position:  Location:    []  Vasopneumatic Device Pressure:       [] lo [] med [] hi   Temperature: [] lo [] med [] hi   [x] Skin assessment post-treatment:  [x]intact []redness- no adverse reaction    []redness - adverse reaction:     35 min Therapeutic Exercise:  [x] See flow sheet :   Rationale: increase ROM, increase strength, improve coordination, improve balance and increase proprioception to improve the patients ability to return to work with ease of climbing in/out of higher vehicles           With   [] TE   [] TA   [] neuro   [] other: Patient Education: [x] Review HEP    [] Progressed/Changed HEP based on:   [] positioning   [] body mechanics   [] transfers   [] heat/ice application    [] other:      Other Objective/Functional Measures:   Updated HEP  Worked on stair negotiation reciprocally; decreased eccentric control during descent  Worked on eccentric step downs with cues for less weight through UE but decreasing use; pt had more difficulty with this but much more effective than previous sessions  Educated on sit to stands to work on at home with eccentric control to practice  Unable to go outside to work on dynamic gait due to weather  Pt still needs to work on floor to stand transfers  Added PNF in supine to help facilitate correct muscles for lifting leg in shower     Pain Level (0-10 scale) post treatment: 1/10    ASSESSMENT/Changes in Function: Pt continues to make progress towards goals with improvement in strength and mobility of the left knee. He has most difficulty with eccentric quad control for stair negotiation. He continues to need further strengthening to improve ease of getting into higher vehicles for work, ambulation without SPC on outdoor surfaces, working on floor to stand transfers and being able to lift his left leg in the shower to wash his feet.      Patient will continue to benefit from skilled PT services to modify and progress therapeutic interventions, address functional mobility deficits, address ROM deficits, address strength deficits, analyze and address soft tissue restrictions, analyze and cue movement patterns, analyze and modify body mechanics/ergonomics, assess and modify postural abnormalities, address imbalance/dizziness and instruct in home and community integration to attain remaining goals. Progress towards goals / Updated goals:  1. Pt will improve FOTO by 16 points in order to demonstrate functional improvement  2. Pt will improve left knee flexion AROM to 115 dgrs in order to improve gait pattern and ease of work tasks. Progressin dgrs 18 116 degrees post manual (18)  3. Pt will improve left knee MMT to 5/ in order to improve ease of work tasks    4. Pt will report no difficulty with car transfers in order to perform work tasks progressing some difficulty on 's side ()    PLAN  [x]  Upgrade activities as tolerated     [x]  Continue plan of care  []  Update interventions per flow sheet       []  Discharge due to:_  [x]  Other: work on outdoor dynamic gait without cane, work on floor to stand transfers, stairs reciprocally.      Refugio Mckinnon PTA 2018  11:52 AM    Future Appointments  Date Time Provider Dex H[place]isti   2018 10:30 AM Sully Ramírez MD [facility]   2018 9:00 AM Jani Dupont, PT MMCPTPB [address]   2018 11:00 AM Refugio Mckinnon PTA MMCPTPB [address]   2018 10:30 AM Refugio Mckinnon PTA MMCPTPB [address]   2018 10:30 AM Jani Dupont, PT [id number] [address]   2018 11:00 AM Refugio Mckinnon PTA [id number] [address]   2018 11:00 AM Refugio Mckinnon PTA MMCPTPB [address]   2018 9:15 AM Jonah Grove PA-C [facility]   6/15/2018 10:30 AM Jani Dupont, PT [id number] [address]   2018 3:30 PM Sara Benedict  [address]

## 2018-05-30 ENCOUNTER — OFFICE VISIT (OUTPATIENT)
Dept: INTERNAL MEDICINE CLINIC | Age: 63
End: 2018-05-30

## 2018-05-30 VITALS
DIASTOLIC BLOOD PRESSURE: 76 MMHG | HEIGHT: 70 IN | WEIGHT: 218 LBS | TEMPERATURE: 98.3 F | BODY MASS INDEX: 31.21 KG/M2 | RESPIRATION RATE: 18 BRPM | HEART RATE: 79 BPM | SYSTOLIC BLOOD PRESSURE: 118 MMHG | OXYGEN SATURATION: 96 %

## 2018-05-30 DIAGNOSIS — Z96.652 STATUS POST LEFT KNEE REPLACEMENT: ICD-10-CM

## 2018-05-30 DIAGNOSIS — I10 ESSENTIAL HYPERTENSION: ICD-10-CM

## 2018-05-30 DIAGNOSIS — Z79.01 WARFARIN ANTICOAGULATION: ICD-10-CM

## 2018-05-30 DIAGNOSIS — R60.0 LEG EDEMA, LEFT: Primary | ICD-10-CM

## 2018-05-30 PROCEDURE — 3331090001 HH PPS REVENUE CREDIT

## 2018-05-30 PROCEDURE — 3331090002 HH PPS REVENUE DEBIT

## 2018-05-30 NOTE — MR AVS SNAPSHOT
[facility] 
 
 
 [address], [address] [place] [address] 
[phone] Patient: Yuan Murphy MRN: [id number] Visit Information Date & Time Provider Department Dept. Phone Encounter #  
 5/30/2018 10:30 AM Marlen Hebert MD [facility] [place] [phone] [id number] Your Appointments 6/14/2018  9:15 AM  
POST OP with Miguel Angel Gee PA-C  
[facility] [place] [address] [address] [place] [address]) Appt Note: 4 WK FU LT KNEE  
 [address] [facility], [address] [facility]  
[phone]  
  
   
 [address], [address] [address]  
  
    
 8/20/2018  3:30 PM  
Follow Up with Qasim Ricks MD  
[address] [facility], [address] [facility] [address]) Appt Note: 3 MO F/U  
 [address] [place] [address]  
[phone]  
  
   
 [address]  
  
    
 8/30/2018  4:00 PM  
Follow Up with Marlen Hebert MD  
[facility] INTER[facility]ICINE OF [place] [address] [facility]) Appt Note: 3 mo f/u  
 [address], [address] [place] [address]  
  
   
 [address], [address] [facility] [address] Upcoming Health Maintenance Date Due Hepatitis C Screening 1955 ZOSTER VACCINE AGE 60> 2/13/2013 GLAUCOMA SCREENING Q2Y 4/13/2018 Pneumococcal 65+ Low/Medium Risk (1 of 2 - PCV13) 4/13/2018 MEDICARE YEARLY EXAM 5/2/2018 Influenza Age 5 to Adult 8/1/2018 DTaP/Tdap/Td series (2 - Td) 12/6/2024 COLONOSCOPY 5/27/2026 Allergies as of 5/30/2018  Review Complete On: 5/30/2018 By: Spencer Tellez LPN Severity Noted Reaction Type Reactions Augmentin [Amoxicillin-pot Clavulanate]    Itching Hibiclens [Chlorhexidine Gluconate]  03/16/2018    Itching Penicillins    Rash Requip [Ropinirole]  05/30/2018    Nausea and Vomiting Current Immunizations  Reviewed on 3/16/2018 Name Date Tdap 12/6/2014 Not reviewed this visit Vitals BP Pulse Temp Resp Height(growth percentile) 118/76 (BP 1 Location: Right arm, BP Patient Position: Sitting) 79 98.3 °F (36.8 °C) (Tympanic) 18 5' 10\" (1.778 m) Weight(growth percentile) SpO2 BMI Smoking Status 218 lb (98.9 kg) 96% 31.28 kg/m2 Never Smoker BMI and BSA Data Body Mass Index Body Surface Area  
 31.28 kg/m 2 2.21 m 2 Preferred Pharmacy Pharmacy Name Phone [facility] [address] [place] [address] [phone] Your Updated Medication List  
  
   
This list is accurate as of 5/30/18 12:39 PM.  Always use your most recent med list.  
  
  
  
  
 acetaminophen 500 mg tablet Commonly known as:  TYLENOL Take 1,000 mg by mouth every six (6) hours as needed for Pain. azelastine 137 mcg (0.1 %) nasal spray Commonly known as:  ASTELIN  
1 spray up each nostril twice per day  
  
 butalbital-acetaminophen-caff -40 mg per capsule Commonly known as:  [employer] Take 2 capsules every 8 hours as needed for headache  
  
 celecoxib 200 mg capsule Commonly known as:  CELEBREX Take 1 Cap by mouth two (2) times a day for 90 days. furosemide 20 mg tablet Commonly known as:  LASIX  
2 tablets every other day HYDROcodone-acetaminophen  mg tablet Commonly known as:  Fredrica Mitchell Take 1 Tab by mouth every eight (8) hours as needed for Pain. Max Daily Amount: 3 Tabs. labetalol 100 mg tablet Commonly known as:  NORMODYNE  
TAKE ONE TABLET BY MOUTH TWICE DAILY  
  
 lansoprazole 30 mg capsule Commonly known as:  PREVACID TAKE 1 CAPSULE DAILY BEFOREBREAKFAST  
  
 lisinopril-hydroCHLOROthiazide 20-12.5 mg per tablet Commonly known as:  PRINZIDE, ZESTORETIC  
TAKE 1 TABLET BY MOUTH DAILY  
  
 metaxalone 800 mg tablet Commonly known as:  SKELAXIN  
 Take 1 Tab by mouth three (3) times daily. Indications: Muscle Spasm * montelukast 10 mg tablet Commonly known as:  SINGULAIR  
TAKE 1 TABLET BY MOUTH EVERY DAY  
  
 * montelukast 10 mg tablet Commonly known as:  SINGULAIR  
TAKE 1 TABLET BY MOUTH EVERY DAY  
  
 oxyCODONE IR 15 mg immediate release tablet Commonly known as:  Rolm Baldemar Take 1 Tab by mouth every six (6) hours as needed for Pain. Max Daily Amount: 60 mg.  
  
 raNITIdine 150 mg tablet Commonly known as:  ZANTAC Take 150 mg by mouth nightly. tamsulosin 0.4 mg capsule Commonly known as:  FLOMAX Take 1 Cap by mouth daily. trimethoprim-sulfamethoxazole 160-800 mg per tablet Commonly known as:  BACTRIM DS, SEPTRA DS Take 1 Tab by mouth two (2) times a day. Indications: Skin and Skin Structure Infection * warfarin 5 mg tablet Commonly known as:  COUMADIN  
TAKE 2 AND 1/2 TABLETS BY MOUTH DAILY OR AS DIRECTED * warfarin 3 mg tablet Commonly known as:  COUMADIN Or as directed * Notice: This list has 4 medication(s) that are the same as other medications prescribed for you. Read the directions carefully, and ask your doctor or other care provider to review them with you. To-Do List   
 05/31/2018 9:00 AM  
  Appointment with Sofi Lundberg PT at [facility] PT [address] [facility] ([phone])  
  
 06/05/2018 11:00 AM  
  Appointment with Bacilio Alas PTA at [facility] PT [address] [facility] ([phone])  
  
 06/07/2018 10:30 AM  
  Appointment with Bacilio Alas PTA at [facility] PT [address] [facility] ([phone])  06/08/2018 10:30 AM  
  Appointment with Sofi Lundberg PT at [facility] PT [address] ([phone])  
  
 06/11/2018 11:00 AM  
  Appointment with Bacilio Alas PTA at [facility] PT [address] ([phone])  
  
 06/13/2018 11:00 AM  
  Appointment with Bacilio Alas PTA at [facility] PT [address] [facility] ([phone])  
  
 06/15/2018 10:30 AM  
 Appointment with Xiao Wilcox, PT at [facility] PT [address] [facility] ([phone]) Please provide this summary of care documentation to your next provider. Your primary care clinician is listed as Titi Gallardo. If you have any questions after today's visit, please call [phone].

## 2018-05-30 NOTE — PROGRESS NOTES
Chief Complaint   Patient presents with    Surgical Follow-up     follow up from knee         Is someone accompanying this pt? Yes wife     Is the patient using any DME equipment during OV? no    Depression Screening:  PHQ over the last two weeks 5/17/2018 5/15/2018 5/11/2018 4/26/2018 4/17/2018 4/12/2018 3/22/2018   PHQ Not Done - - Patient Decline - - - -   Little interest or pleasure in doing things Not at all Not at all - Not at all Not at all Not at all Not at all   Feeling down, depressed or hopeless Not at all Not at all - Not at all Several days Not at all Not at all   Total Score PHQ 2 0 0 - 0 1 0 0       Learning Assessment:  Learning Assessment 9/25/2015   PRIMARY LEARNER Patient   HIGHEST LEVEL OF EDUCATION - PRIMARY LEARNER  GRADUATED HIGH SCHOOL OR GED   BARRIERS PRIMARY LEARNER [address] CAREGIVER No   PRIMARY LANGUAGE ENGLISH    NEED No   LEARNER PREFERENCE PRIMARY DEMONSTRATION   ANSWERED BY patient   RELATIONSHIP SELF       Abuse Screening:  Abuse Screening Questionnaire 2/15/2016   Do you ever feel afraid of your partner? N   Are you in a relationship with someone who physically or mentally threatens you? N   Is it safe for you to go home? Evie Salvador is not updated on all     Coordination of Care:  1. Have you been to the ER, urgent care clinic since your last visit? Hospitalized since your last visit? no    2. Have you seen or consulted any other health care providers outside of the [address] [place] [address] since your last visit? Include any pap smears or colon screening.  no

## 2018-05-31 ENCOUNTER — HOSPITAL ENCOUNTER (OUTPATIENT)
Dept: PHYSICAL THERAPY | Age: 63
Discharge: HOME | End: 2018-05-31
Payer: MEDICARE

## 2018-05-31 PROCEDURE — 3331090003 HH PPS REVENUE ADJ

## 2018-05-31 PROCEDURE — 97110 THERAPEUTIC EXERCISES: CPT

## 2018-05-31 PROCEDURE — 97112 NEUROMUSCULAR REEDUCATION: CPT

## 2018-05-31 PROCEDURE — 3331090001 HH PPS REVENUE CREDIT

## 2018-05-31 PROCEDURE — 3331090002 HH PPS REVENUE DEBIT

## 2018-05-31 NOTE — PROGRESS NOTES
PT DAILY TREATMENT NOTE - [facility]     Patient Name: Robert Layne  Date:2018  : 1955  [x]  Patient  Verified  Payor: BLUE CROSS / Plan: [employer] [facility] / Product Type: PPO /    In time:9:00  Out time:9:31  Total Treatment Time (min): 31  Total Timed Codes (min): 31  1:1 Treatment Time ( only): 31   Visit #: 5 of     Treatment Area: Pain in left knee [M25.562]  Other acute postprocedural pain [G89.18]    SUBJECTIVE  Pain Level (0-10 scale): 2-310  Any medication changes, allergies to medications, adverse drug reactions, diagnosis change, or new procedure performed?: [x] No    [] Yes (see summary sheet for update)  Subjective functional status/changes:   [] No changes reported  Little stiff because it's earlier in the day. The shoulder is bothering him more than the knee right now so he doesn't want to use the arms on the bike. OBJECTIVE    21 min Therapeutic Exercise:  [x] See flow sheet :   Rationale: increase ROM, increase strength and improve coordination to improve the patients ability to negotiate stairs reciprocally. 10 min Neuromuscular Re-education:  []  See flow sheet : Romberg on foam no UE with EC; ambulating outside with curbs   Rationale: increase strength, improve coordination and improve balance  to improve the patients ability to walk around community. With   [] TE   [] TA   [] neuro   [] other: Patient Education: [x] Review HEP    [] Progressed/Changed HEP based on:   [] positioning   [] body mechanics   [] transfers   [] heat/ice application    [] other:      Other Objective/Functional Measures:   Still challenged by hip x3 in the diagonal direction. Was harder to use only 1 UE on eccentric step-downs and step-ups  Tolerated strengthening exercises well and no LOB on foam with EC in Romberg stance. Pt demonstrated great form for descending and ascending curbs outside. Unable to work on ambulating on grass due to rain.    Pt reports some difficulty with car transfers still. Pain Level (0-10 scale) post treatment: 2/10    ASSESSMENT/Changes in Function: Pt is slowly progressing toward goals. L knee flexion strength and ROM remain relatively unchanged despite compliance with HEP and progression of exercises in clinic. Pt shows minimal difficulty with current exercises, so increases in weight may be beneficial to help reach goals. Stretching of L knee should be emphasized to increase AROM knee flexion. Patient will continue to benefit from skilled PT services to modify and progress therapeutic interventions, address functional mobility deficits, address ROM deficits, address strength deficits, analyze and address soft tissue restrictions, analyze and cue movement patterns, analyze and modify body mechanics/ergonomics and address imbalance/dizziness to attain remaining goals. [x]  See Plan of Care  []  See progress note/recertification  []  See Discharge Summary         Progress towards goals / Updated goals:  1. Pt will improve FOTO by 16 points in order to demonstrate functional improvement  2. Pt will improve left knee flexion AROM to 115 dgrs in order to improve gait pattern and ease of work tasks. Progressin dgrs 18 116 degrees post manual (18)  3. Pt will improve left knee MMT to 5/5 in order to improve ease of work tasks . 3/5 L (2018)  4.  Pt will report no difficulty with car transfers in order to perform work tasks progressing some difficulty on 's side (3/54/31)    PLAN  [x]  Upgrade activities as tolerated     [x]  Continue plan of care  []  Update interventions per flow sheet       []  Discharge due to:_  []  Other:_      Oswaldo Putnam 2018  9:02 AM    Future Appointments  Date Time Provider [place]   2018 11:00 AM Refugio Mckinnon PTA [facility] [address]   2018 10:30 AM Refugio Mckinnon PTA [facility] [address]   2018 10:30 AM Jani Dupont PT [facility] [address]   2018 11:00 AM Kwasi Eugene, PTA [facility]   6/13/2018 11:00 AM Kwasi Eugene, PTA [facility]   6/14/2018 9:15 AM Edu Riojas PA-C [facility]   6/15/2018 10:30 AM Lorna Reynolds, PT [id number]   8/20/2018 3:30 PM Anny Reagan  [address]   8/30/2018 4:00 PM Rowan Munoz  [address] [place]

## 2018-05-31 NOTE — PROGRESS NOTES
The patient presents to the office today with the chief complaint of LLe Edema    HPI    The patient is status post left knee replacement. He is doing well and gaining improvement with ambulation. The patient had LE swelling in both legs but mor marked in his left leg. DVT studies were negative. The patient remains on Coumadin due to recurrent DVT with an apparent hypercoagulable state. Review of Systems   Respiratory: Negative for shortness of breath. Cardiovascular: Positive for leg swelling. Negative for chest pain. Allergies   Allergen Reactions    Augmentin [Amoxicillin-Pot Clavulanate] Itching    Hibiclens [Chlorhexidine Gluconate] Itching    Penicillins Rash    Requip [Ropinirole] Nausea and Vomiting       Current Outpatient Prescriptions   Medication Sig Dispense Refill    warfarin (COUMADIN) 5 mg tablet TAKE 2 AND 1/2 TABLETS BY MOUTH DAILY OR AS DIRECTED 75 Tab 0    warfarin (COUMADIN) 3 mg tablet Or as directed 30 Tab 3    HYDROcodone-acetaminophen (NORCO)  mg tablet Take 1 Tab by mouth every eight (8) hours as needed for Pain. Max Daily Amount: 3 Tabs. 40 Tab 0    raNITIdine (ZANTAC) 150 mg tablet Take 150 mg by mouth nightly. 5    tamsulosin (FLOMAX) 0.4 mg capsule Take 1 Cap by mouth daily. 30 Cap 2    celecoxib (CELEBREX) 200 mg capsule Take 1 Cap by mouth two (2) times a day for 90 days. 60 Cap 2    lisinopril-hydroCHLOROthiazide (PRINZIDE, ZESTORETIC) 20-12.5 mg per tablet TAKE 1 TABLET BY MOUTH DAILY 90 Tab 0    labetalol (NORMODYNE) 100 mg tablet TAKE ONE TABLET BY MOUTH TWICE DAILY (Patient taking differently: TAKE ONE TABLET BY MOUTH DAILY) 180 Tab 0    azelastine (ASTELIN) 137 mcg (0.1 %) nasal spray 1 spray up each nostril twice per day (Patient taking differently: 1 Spray by Both Nostrils route daily. Using once per day) 1 Bottle 1    metaxalone (SKELAXIN) 800 mg tablet Take 1 Tab by mouth three (3) times daily.  Indications: Muscle Spasm (Patient taking differently: Take 800 mg by mouth three (3) times daily as needed. Indications: Muscle Spasm) 90 Tab 2    montelukast (SINGULAIR) 10 mg tablet TAKE 1 TABLET BY MOUTH EVERY DAY (Patient taking differently: TAKE 1 TABLET BY MOUTH EVERY HS) 90 Tab 0    butalbital-acetaminophen-caff (FIORICET) -40 mg per capsule Take 2 capsules every 8 hours as needed for headache 540 Cap 3    lansoprazole (PREVACID) 30 mg capsule TAKE 1 CAPSULE DAILY BEFOREBREAKFAST 90 Cap 3    acetaminophen (TYLENOL) 500 mg tablet Take 1,000 mg by mouth every six (6) hours as needed for Pain. Past Medical History:   Diagnosis Date    Arthritis     Bleeding     Blood clot in the legs     Chronic pain     knee and shoulder    Esophageal reflux     GERD (gastroesophageal reflux disease)     Headache(784.0)     migraine    High cholesterol     Hypertension     Lower back pain 11/6/2010    Other chest pain     Personal history of venous thrombosis and embolism     Pure hypercholesterolemia     Right buttock pain 11/6/2010    Right foot pain     Rotator cuff tear     left-since 2010, worsened tear Jan.    Spinal stenosis     Tendonitis, tibialis     anterior    Thromboembolus ([facility] [address]     3 after sx last one 2000       Past Surgical History:   Procedure Laterality Date    FOOT/TOES SURGERY PROC UNLISTED      HX BACK SURGERY      HX ORTHOPAEDIC  06-25-12    Right foot with excision of bursa and adipose tissue from fifth metatarsal base by Dr. Sakina Ramirez      lower back (1992 and 2000)    HX OTHER SURGICAL      left foot (2008)    LAMINOTOMY      NERVE BLOCK         Social History     Social History    Marital status:      Spouse name: N/A    Number of children: N/A    Years of education: N/A     Occupational History    Not on file.      Social History Main Topics    Smoking status: Never Smoker    Smokeless tobacco: Never Used    Alcohol use No    Drug use: No    Sexual activity: Not Currently     Other Topics Concern    Not on file     Social History Narrative       Patient does not have an advanced directive on file    Visit Vitals    /76 (BP 1 Location: Right arm, BP Patient Position: Sitting)    Pulse 79    Temp 98.3 °F (36.8 °C) (Tympanic)    Resp 18    Ht 5' 10\" (1.778 m)    Wt 218 lb (98.9 kg)    SpO2 96%    BMI 31.28 kg/m2       Physical Exam   Neck: Carotid bruit is not present. Cardiovascular: Normal rate and regular rhythm. Exam reveals no gallop. No murmur heard. Pulmonary/Chest: He has no wheezes. He has no rales. Musculoskeletal: He exhibits edema (trace edema right leg. 1+ edema right leg). Left knee Incision well healed. Good range of motion       BMI:  BMI is high but it was not addressed during this visit due to patient recovering from surgery      Hospital Outpatient Visit on 05/24/2018   Component Date Value Ref Range Status    WBC 05/24/2018 5.5  4.6 - 13.2 K/uL Final    RBC 05/24/2018 4.05* 4.70 - 5.50 M/uL Final    HGB 05/24/2018 12.4* 13.0 - 16.0 g/dL Final    HCT 05/24/2018 36.9  36.0 - 48.0 % Final    MCV 05/24/2018 91.1  74.0 - 97.0 FL Final    MCH 05/24/2018 30.6  24.0 - 34.0 PG Final    MCHC 05/24/2018 33.6  31.0 - 37.0 g/dL Final    RDW 05/24/2018 13.5  11.6 - 14.5 % Final    PLATELET 59/31/8493 069  135 - 420 K/uL Final    MPV 05/24/2018 10.7  9.2 - 11.8 FL Final    NEUTROPHILS 05/24/2018 62  40 - 73 % Final    LYMPHOCYTES 05/24/2018 23  21 - 52 % Final    MONOCYTES 05/24/2018 11* 3 - 10 % Final    EOSINOPHILS 05/24/2018 3  0 - 5 % Final    BASOPHILS 05/24/2018 1  0 - 2 % Final    ABS. NEUTROPHILS 05/24/2018 3.4  1.8 - 8.0 K/UL Final    ABS. LYMPHOCYTES 05/24/2018 1.3  0.9 - 3.6 K/UL Final    ABS. MONOCYTES 05/24/2018 0.6  0.05 - 1.2 K/UL Final    ABS. EOSINOPHILS 05/24/2018 0.2  0.0 - 0.4 K/UL Final    ABS.  BASOPHILS 05/24/2018 0.0  0.0 - 0.1 K/UL Final    DF 05/24/2018 AUTOMATED    Final    Sodium 05/24/2018 143 136 - 145 mmol/L Final    Potassium 05/24/2018 3.8  3.5 - 5.5 mmol/L Final    Chloride 05/24/2018 106  100 - 108 mmol/L Final    CO2 05/24/2018 29  21 - 32 mmol/L Final    Anion gap 05/24/2018 8  3.0 - 18 mmol/L Final    Glucose 05/24/2018 110* 74 - 99 mg/dL Final    BUN 05/24/2018 17  7.0 - 18 MG/DL Final    Creatinine 05/24/2018 1.03  0.6 - 1.3 MG/DL Final    BUN/Creatinine ratio 05/24/2018 17  12 - 20   Final    GFR est AA 05/24/2018 >60  >60 ml/min/1.73m2 Final    GFR est non-AA 05/24/2018 >60  >60 ml/min/1.73m2 Final    Comment: (NOTE)  Estimated GFR is calculated using the Modification of Diet in Renal   Disease (MDRD) Study equation, reported for both  Americans   (GFRAA) and non- Americans (GFRNA), and normalized to 1.73m2   body surface area. The physician must decide which value applies to   the patient. The MDRD study equation should only be used in   individuals age 25 or older. It has not been validated for the   following: pregnant women, patients with serious comorbid conditions,   or on certain medications, or persons with extremes of body size,   muscle mass, or nutritional status.  Calcium 05/24/2018 8.8  8.5 - 10.1 MG/DL Final    Bilirubin, total 05/24/2018 0.2  0.2 - 1.0 MG/DL Final    ALT (SGPT) 05/24/2018 24  16 - 61 U/L Final    AST (SGOT) 05/24/2018 14* 15 - 37 U/L Final    Alk.  phosphatase 05/24/2018 68  45 - 117 U/L Final    Protein, total 05/24/2018 6.0* 6.4 - 8.2 g/dL Final    Albumin 05/24/2018 3.5  3.4 - 5.0 g/dL Final    Globulin 05/24/2018 2.5  2.0 - 4.0 g/dL Final    A-G Ratio 05/24/2018 1.4  0.8 - 1.7   Final    Prostate Specific Ag 05/24/2018 5.3* 0.0 - 4.0 ng/mL Final    Prothrombin time 05/24/2018 28.1* 11.5 - 15.2 sec Final    INR 05/24/2018 2.7* 0.8 - 1.2   Final    Comment:            INR Therapeutic Ranges         (on stable oral anticoagulant):     INDICATION                INR  DVT/PE/Atrial Fib          2.0-3.0  MI/Mechanical Heart Valve  2.5-3.5     Anti-Coag visit on 05/17/2018   Component Date Value Ref Range Status    VALID INTERNAL CONTROL POC 05/17/2018 Yes   Final    INR POC 05/17/2018 2.8   Final   Hospital Outpatient Visit on 05/09/2018   Component Date Value Ref Range Status    C-Reactive protein 05/09/2018 0.7* 0 - 0.3 mg/dL Final    WBC 05/09/2018 5.5  4.6 - 13.2 K/uL Final    RBC 05/09/2018 3.96* 4.70 - 5.50 M/uL Final    HGB 05/09/2018 12.3* 13.0 - 16.0 g/dL Final    HCT 05/09/2018 36.0  36.0 - 48.0 % Final    MCV 05/09/2018 90.9  74.0 - 97.0 FL Final    MCH 05/09/2018 31.1  24.0 - 34.0 PG Final    MCHC 05/09/2018 34.2  31.0 - 37.0 g/dL Final    RDW 05/09/2018 13.5  11.6 - 14.5 % Final    PLATELET 87/14/9821 638  135 - 420 K/uL Final    MPV 05/09/2018 10.8  9.2 - 11.8 FL Final    NEUTROPHILS 05/09/2018 61  40 - 73 % Final    LYMPHOCYTES 05/09/2018 26  21 - 52 % Final    MONOCYTES 05/09/2018 9  3 - 10 % Final    EOSINOPHILS 05/09/2018 3  0 - 5 % Final    BASOPHILS 05/09/2018 1  0 - 2 % Final    ABS. NEUTROPHILS 05/09/2018 3.4  1.8 - 8.0 K/UL Final    ABS. LYMPHOCYTES 05/09/2018 1.4  0.9 - 3.6 K/UL Final    ABS. MONOCYTES 05/09/2018 0.5  0.05 - 1.2 K/UL Final    ABS. EOSINOPHILS 05/09/2018 0.1  0.0 - 0.4 K/UL Final    ABS. BASOPHILS 05/09/2018 0.0  0.0 - 0.06 K/UL Final    DF 05/09/2018 AUTOMATED    Final    Interleukin-6 (IL-6) 05/09/2018 1.7  0.0 - 15.5 pg/mL Final    Comment: (NOTE)  Results for this test are for research purposes only by the assay's  . The performance characteristics of this product have  not been established. Results should not be used as a diagnostic  procedure without confirmation of the diagnosis by another  medically established diagnostic product or procedure.   Performed At: [address] [id number]  Marisela Davenport MD [id number]      Sed rate, automated 05/09/2018 14  0 - 20 mm/hr Final   Hospital Outpatient Visit on 04/04/2018   Component Date Value Ref Range Status    C-Reactive protein 04/04/2018 6.1* 0 - 0.3 mg/dL Final    WBC 04/04/2018 6.4  4.6 - 13.2 K/uL Final    RBC 04/04/2018 3.42* 4.70 - 5.50 M/uL Final    HGB 04/04/2018 10.8* 13.0 - 16.0 g/dL Final    HCT 04/04/2018 33.2* 36.0 - 48.0 % Final    MCV 04/04/2018 97.1* 74.0 - 97.0 FL Final    MCH 04/04/2018 31.6  24.0 - 34.0 PG Final    MCHC 04/04/2018 32.5  31.0 - 37.0 g/dL Final    RDW 04/04/2018 12.3  11.6 - 14.5 % Final    PLATELET 21/87/3524 310  135 - 420 K/uL Final    MPV 04/04/2018 9.6  9.2 - 11.8 FL Final    NEUTROPHILS 04/04/2018 66  40 - 73 % Final    LYMPHOCYTES 04/04/2018 15* 21 - 52 % Final    MONOCYTES 04/04/2018 12* 3 - 10 % Final    EOSINOPHILS 04/04/2018 6* 0 - 5 % Final    BASOPHILS 04/04/2018 1  0 - 2 % Final    ABS. NEUTROPHILS 04/04/2018 4.3  1.8 - 8.0 K/UL Final    ABS. LYMPHOCYTES 04/04/2018 0.9  0.9 - 3.6 K/UL Final    ABS. MONOCYTES 04/04/2018 0.8  0.05 - 1.2 K/UL Final    ABS. EOSINOPHILS 04/04/2018 0.4  0.0 - 0.4 K/UL Final    ABS. BASOPHILS 04/04/2018 0.0  0.0 - 0.06 K/UL Final    DF 04/04/2018 AUTOMATED    Final    Sodium 04/04/2018 140  136 - 145 mmol/L Final    Potassium 04/04/2018 4.0  3.5 - 5.5 mmol/L Final    Chloride 04/04/2018 103  100 - 108 mmol/L Final    CO2 04/04/2018 30  21 - 32 mmol/L Final    Anion gap 04/04/2018 7  3.0 - 18 mmol/L Final    Glucose 04/04/2018 127* 74 - 99 mg/dL Final    BUN 04/04/2018 14  7.0 - 18 MG/DL Final    Creatinine 04/04/2018 0.87  0.6 - 1.3 MG/DL Final    BUN/Creatinine ratio 04/04/2018 16  12 - 20   Final    GFR est AA 04/04/2018 >60  >60 ml/min/1.73m2 Final    GFR est non-AA 04/04/2018 >60  >60 ml/min/1.73m2 Final    Comment: (NOTE)  Estimated GFR is calculated using the Modification of Diet in Renal   Disease (MDRD) Study equation, reported for both  Americans   (GFRAA) and non- Americans (GFRNA), and normalized to 1.73m2   body surface area.  The physician must decide which value applies to   the patient. The MDRD study equation should only be used in   individuals age 25 or older. It has not been validated for the   following: pregnant women, patients with serious comorbid conditions,   or on certain medications, or persons with extremes of body size,   muscle mass, or nutritional status.  Calcium 04/04/2018 9.2  8.5 - 10.1 MG/DL Final    Bilirubin, total 04/04/2018 0.3  0.2 - 1.0 MG/DL Final    ALT (SGPT) 04/04/2018 30  16 - 61 U/L Final    AST (SGOT) 04/04/2018 21  15 - 37 U/L Final    Alk. phosphatase 04/04/2018 52  45 - 117 U/L Final    Protein, total 04/04/2018 6.2* 6.4 - 8.2 g/dL Final    Albumin 04/04/2018 2.9* 3.4 - 5.0 g/dL Final    Globulin 04/04/2018 3.3  2.0 - 4.0 g/dL Final    A-G Ratio 04/04/2018 0.9  0.8 - 1.7   Final   Admission on 03/27/2018, Discharged on 03/28/2018   Component Date Value Ref Range Status    INR (POC) 03/27/2018 1.0  <1.2   Final    The intended use of the i-STAT PT/INR is for monitoring oral anticoagulant therapy and not for evaluating individual factor deficiencies.  WBC 03/28/2018 9.6  4.6 - 13.2 K/uL Final    RBC 03/28/2018 3.73* 4.70 - 5.50 M/uL Final    HGB 03/28/2018 11.7* 13.0 - 16.0 g/dL Final    This is a post-surgery specimen.  HCT 03/28/2018 35.6* 36.0 - 48.0 % Final    MCV 03/28/2018 95.4  74.0 - 97.0 FL Final    MCH 03/28/2018 31.4  24.0 - 34.0 PG Final    MCHC 03/28/2018 32.9  31.0 - 37.0 g/dL Final    RDW 03/28/2018 13.0  11.6 - 14.5 % Final    PLATELET 02/58/4621 527  135 - 420 K/uL Final    MPV 03/28/2018 10.7  9.2 - 11.8 FL Final    NEUTROPHILS 03/28/2018 73  40 - 73 % Final    LYMPHOCYTES 03/28/2018 14* 21 - 52 % Final    MONOCYTES 03/28/2018 13* 3 - 10 % Final    EOSINOPHILS 03/28/2018 0  0 - 5 % Final    BASOPHILS 03/28/2018 0  0 - 2 % Final    ABS. NEUTROPHILS 03/28/2018 7.0  1.8 - 8.0 K/UL Final    ABS. LYMPHOCYTES 03/28/2018 1.3  0.9 - 3.6 K/UL Final    ABS. MONOCYTES 03/28/2018 1.2  0.05 - 1.2 K/UL Final    ABS. EOSINOPHILS 03/28/2018 0.0  0.0 - 0.4 K/UL Final    ABS.  BASOPHILS 03/28/2018 0.0  0.0 - 0.1 K/UL Final    DF 03/28/2018 AUTOMATED    Final    Prothrombin time 03/28/2018 14.9  11.5 - 15.2 sec Final    INR 03/28/2018 1.2  0.8 - 1.2   Final    Comment:            INR Therapeutic Ranges         (on stable oral anticoagulant):     INDICATION                INR  DVT/PE/Atrial Fib          2.0-3.0  MI/Mechanical Heart Valve  2.5-3.5     Hospital Outpatient Visit on 03/22/2018   Component Date Value Ref Range Status    Color 03/22/2018 YELLOW    Final    Appearance 03/22/2018 CLEAR    Final    Specific gravity 03/22/2018 1.006  1.005 - 1.030   Final    pH (UA) 03/22/2018 6.5  5.0 - 8.0   Final    Protein 03/22/2018 NEGATIVE   NEG mg/dL Final    Glucose 03/22/2018 NEGATIVE   NEG mg/dL Final    Ketone 03/22/2018 NEGATIVE   NEG mg/dL Final    Bilirubin 03/22/2018 NEGATIVE   NEG   Final    Blood 03/22/2018 NEGATIVE   NEG   Final    Urobilinogen 03/22/2018 0.2  0.2 - 1.0 EU/dL Final    Nitrites 03/22/2018 NEGATIVE   NEG   Final    Leukocyte Esterase 03/22/2018 NEGATIVE   NEG   Final    Special Requests: 03/22/2018 NO SPECIAL REQUESTS    Final    Culture result: 03/22/2018 NO GROWTH 1 DAY    Final   Hospital Outpatient Visit on 03/16/2018   Component Date Value Ref Range Status    WBC 03/16/2018 8.5  4.6 - 13.2 K/uL Final    RBC 03/16/2018 4.48* 4.70 - 5.50 M/uL Final    HGB 03/16/2018 14.7  13.0 - 16.0 g/dL Final    HCT 03/16/2018 42.3  36.0 - 48.0 % Final    MCV 03/16/2018 94.4  74.0 - 97.0 FL Final    MCH 03/16/2018 32.8  24.0 - 34.0 PG Final    MCHC 03/16/2018 34.8  31.0 - 37.0 g/dL Final    RDW 03/16/2018 13.0  11.6 - 14.5 % Final    PLATELET 47/65/7998 849  135 - 420 K/uL Final    MPV 03/16/2018 11.3  9.2 - 11.8 FL Final    NEUTROPHILS 03/16/2018 71  40 - 73 % Final    LYMPHOCYTES 03/16/2018 17* 21 - 52 % Final    MONOCYTES 03/16/2018 11* 3 - 10 % Final    EOSINOPHILS 03/16/2018 1  0 - 5 % Final    BASOPHILS 03/16/2018 0  0 - 2 % Final    ABS. NEUTROPHILS 03/16/2018 6.0  1.8 - 8.0 K/UL Final    ABS. LYMPHOCYTES 03/16/2018 1.5  0.9 - 3.6 K/UL Final    ABS. MONOCYTES 03/16/2018 0.9  0.05 - 1.2 K/UL Final    ABS. EOSINOPHILS 03/16/2018 0.1  0.0 - 0.4 K/UL Final    ABS. BASOPHILS 03/16/2018 0.0  0.0 - 0.06 K/UL Final    DF 03/16/2018 AUTOMATED    Final    Prothrombin time 03/16/2018 23.5* 11.5 - 15.2 sec Final    INR 03/16/2018 2.2* 0.8 - 1.2   Final    Comment:            INR Therapeutic Ranges         (on stable oral anticoagulant):     INDICATION                INR  DVT/PE/Atrial Fib          2.0-3.0  MI/Mechanical Heart Valve  2.5-3.5      aPTT 03/16/2018 32.2  23.0 - 36.4 SEC Final    Sodium 03/16/2018 142  136 - 145 mmol/L Final    Potassium 03/16/2018 4.0  3.5 - 5.5 mmol/L Final    Chloride 03/16/2018 104  100 - 108 mmol/L Final    CO2 03/16/2018 30  21 - 32 mmol/L Final    Anion gap 03/16/2018 8  3.0 - 18 mmol/L Final    Glucose 03/16/2018 126* 74 - 99 mg/dL Final    BUN 03/16/2018 16  7.0 - 18 MG/DL Final    Creatinine 03/16/2018 0.98  0.6 - 1.3 MG/DL Final    BUN/Creatinine ratio 03/16/2018 16  12 - 20   Final    GFR est AA 03/16/2018 >60  >60 ml/min/1.73m2 Final    GFR est non-AA 03/16/2018 >60  >60 ml/min/1.73m2 Final    Comment: (NOTE)  Estimated GFR is calculated using the Modification of Diet in Renal   Disease (MDRD) Study equation, reported for both  Americans   (GFRAA) and non- Americans (GFRNA), and normalized to 1.73m2   body surface area. The physician must decide which value applies to   the patient. The MDRD study equation should only be used in   individuals age 25 or older. It has not been validated for the   following: pregnant women, patients with serious comorbid conditions,   or on certain medications, or persons with extremes of body size,   muscle mass, or nutritional status.       Calcium 03/16/2018 9.1  8.5 - 10.1 MG/DL Final    Crossmatch Expiration 03/16/2018 03/30/2018   Final    ABO/Rh(D) 03/16/2018 B POSITIVE   Final    Antibody screen 03/16/2018 NEG   Final    Color 03/16/2018 YELLOW    Final    Appearance 03/16/2018 CLEAR    Final    Specific gravity 03/16/2018 1.015  1.005 - 1.030   Final    pH (UA) 03/16/2018 6.5  5.0 - 8.0   Final    Protein 03/16/2018 NEGATIVE   NEG mg/dL Final    Glucose 03/16/2018 NEGATIVE   NEG mg/dL Final    Ketone 03/16/2018 NEGATIVE   NEG mg/dL Final    Bilirubin 03/16/2018 NEGATIVE   NEG   Final    Blood 03/16/2018 NEGATIVE   NEG   Final    Urobilinogen 03/16/2018 0.2  0.2 - 1.0 EU/dL Final    Nitrites 03/16/2018 NEGATIVE   NEG   Final    Leukocyte Esterase 03/16/2018 TRACE* NEG   Final    Special Requests: 03/16/2018 NO SPECIAL REQUESTS    Final    Culture result: 03/16/2018 NO GROWTH 1 DAY    Final    WBC 03/16/2018 0 to 2  0 - 4 /hpf Final    RBC 03/16/2018 NONE  0 - 5 /hpf Final    Epithelial cells 03/16/2018 FEW  0 - 5 /lpf Final    Bacteria 03/16/2018 NEGATIVE   NEG /hpf Final       .No results found for any visits on 05/30/18. Assessment / Plan      ICD-10-CM ICD-9-CM    1. Leg edema, left R60.0 782.3    2. Warfarin anticoagulation Z79.01 V58.61    3. Essential hypertension I10 401.9    4. Status post left knee replacement Z96.652 V43.65      Protime is slightly high - will hold one day of Coumadin  The patient is on Celebrex. I explained the possibilty of GI bleeding from an NSAID. I recommended decreasing to one daily for several weeks then discontinuing  he was advised to continue his other maintenance medications        Follow-up Disposition:  Return in about 2 months (around 7/30/2018). I asked René Powell if he has any questions and I answered the questions.   René Powell states that he understands the treatment plan and agrees with the treatment plan

## 2018-06-04 NOTE — TELEPHONE ENCOUNTER
Requested Prescriptions     Pending Prescriptions Disp Refills    butalbital-acetaminophen-caff (FIORICET) -40 mg per capsule 540 Cap 3     Sig: Take 2 capsules every 8 hours as needed for headache

## 2018-06-05 ENCOUNTER — HOSPITAL ENCOUNTER (OUTPATIENT)
Dept: PHYSICAL THERAPY | Age: 63
Discharge: HOME | End: 2018-06-05
Payer: MEDICARE

## 2018-06-05 PROCEDURE — 97110 THERAPEUTIC EXERCISES: CPT

## 2018-06-05 PROCEDURE — 97016 VASOPNEUMATIC DEVICE THERAPY: CPT

## 2018-06-05 RX ORDER — BUTALBITAL, ACETAMINOPHEN AND CAFFEINE 300; 40; 50 MG/1; MG/1; MG/1
CAPSULE ORAL
Qty: 90 CAP | Refills: 3 | Status: SHIPPED | OUTPATIENT
Start: 2018-06-05 | End: 2018-07-12 | Stop reason: SDUPTHER

## 2018-06-05 NOTE — PROGRESS NOTES
PT DAILY TREATMENT NOTE - [facility]     Patient Name: Cristal Serrano  Date:2018  : 1955  [x]  Patient  Verified  Payor: BLUE CROSS / Plan: [employer] [facility] [place] / Product Type: PPO /    In time: 11:00  Out time:12:03  Total Treatment Time (min):63  Total Timed Codes (min): 48  1:1 Treatment Time ( only): 30  Visit #: 6 of     Treatment Area: Pain in left knee [M25.562]  Other acute postprocedural pain [G89.18]    SUBJECTIVE  Pain Level (0-10 scale): 3/10  Any medication changes, allergies to medications, adverse drug reactions, diagnosis change, or new procedure performed?: [x] No    [] Yes (see summary sheet for update)  Subjective functional status/changes:   [] No changes reported  Pt reports this last week he feels he has stepped backwards in his progress. He had some buckling the other day with walking and more pain. He has been doing his exercises 2-3 times a day including the new ones and walking more. He hasn't returned to work yet but is getting anxious about it.     OBJECTIVE    48 min Therapeutic Exercise:  [x] See flow sheet :   Rationale: increase ROM, increase strength, improve coordination, improve balance and increase proprioception to improve the patients ability to ambulate outside with decreased fall risk without fall risk    15 minutes of game ready with low temp and med pressure with leg elevated above heart in supine for decreased swelling and improved ease of ambulation           With   [] TE   [] TA   [] neuro   [] other: Patient Education: [x] Review HEP    [] Progressed/Changed HEP based on:   [] positioning   [] body mechanics   [] transfers   [] heat/ice application    [] other:      Other Objective/Functional Measures:   Heat and swelling noted to left knee but no redness  Discussed with pt about decreasing frequency of exercises and making sure to ice during the day  Discussed return to work and how pt could ask to start with less hours and ramp up time  Left knee AROM 2-108 degrees due to swelling  Discussed elevation of leg for swelling and compression  No increased pain during session    Pain Level (0-10 scale) post treatment: 2-3/10    ASSESSMENT/Changes in Function: Pt making progress towards goals with minor setback likely due to inflammatory process from excessive exercises or walking. Pt is still maintaining decent ROM but having some buckling with ambulation. Will work to decrease swelling and restore normal left knee strength/stability for return to work duties. Patient will continue to benefit from skilled PT services to modify and progress therapeutic interventions, address functional mobility deficits, address ROM deficits, address strength deficits, analyze and address soft tissue restrictions, analyze and cue movement patterns, analyze and modify body mechanics/ergonomics, assess and modify postural abnormalities, address imbalance/dizziness and instruct in home and community integration to attain remaining goals. Progress towards goals / Updated goals:  1. Pt will improve FOTO by 16 points in order to demonstrate functional improvement  2. Pt will improve left knee flexion AROM to 115 dgrs in order to improve gait pattern and ease of work tasks. Progressin dgrs 18 116 degrees post manual (18)  3. Pt will improve left knee MMT to 5/5 in order to improve ease of work tasks . 3/5 L (2018)  4.  Pt will report no difficulty with car transfers in order to perform work tasks progressing some difficulty on 's side ()    PLAN  [x]  Upgrade activities as tolerated     [x]  Continue plan of care  []  Update interventions per flow sheet       []  Discharge due to:_  []  Other:_      Deborah Alba PTA 2018  10:00 AM    Future Appointments  Date Time Provider Dex [place]   2018 11:00 AM Deborah Alba PTA [facility]   2018 10:30 AM Deborah Alba PTA [facility]   2018 10:30 AM Jacqualine Schaumann, PT [id number] [address]   6/11/2018 11:00 AM Maris Sheikh, PTA [facility]   6/13/2018 11:00 AM Maris Sheikh PTA [facility]   6/14/2018 9:15 AM Matti Simmons PA-C [facility]   6/15/2018 10:30 AM Orion Heredia, PT [id number] [facility]   8/20/2018 3:30 PM Nichole Doe  [address] 8/30/2018 4:00 PM Blade Levy  [address] [place]

## 2018-06-07 ENCOUNTER — HOSPITAL ENCOUNTER (OUTPATIENT)
Dept: PHYSICAL THERAPY | Age: 63
Discharge: HOME | End: 2018-06-07
Payer: MEDICARE

## 2018-06-07 PROCEDURE — 97016 VASOPNEUMATIC DEVICE THERAPY: CPT

## 2018-06-07 PROCEDURE — 97110 THERAPEUTIC EXERCISES: CPT

## 2018-06-07 PROCEDURE — 97140 MANUAL THERAPY 1/> REGIONS: CPT

## 2018-06-07 NOTE — PROGRESS NOTES
PT DAILY TREATMENT NOTE - [facility]     Patient Name: Isabel Ferguson  Date:2018  : 1955  [x]  Patient  Verified  Payor: BLUE CROSS / Plan:  [facility] / Product Type: PPO /    In time:10:30  Out time:11:26  Total Treatment Time (min): 56  Total Timed Codes (min): 41  1:1 Treatment Time ( only): 35  Visit #: 7 of     Treatment Area: Pain in left knee [M25.562]  Other acute postprocedural pain [G89.18]    SUBJECTIVE  Pain Level (0-10 scale): 3/10  Any medication changes, allergies to medications, adverse drug reactions, diagnosis change, or new procedure performed?: [x] No    [] Yes (see summary sheet for update)  Subjective functional status/changes:   [] No changes reported  Pt reports the swelling is better today and he iced 6-7 times throughout yesterday. He notes more stiffness today and wasn't sure what to work on to relieve the stiffness at home. He goes back to the MD next week and wants to find out about going back to work but shorter hours to start since he has to do a lot of climbing in/out of vehicles. He isn't able to climb his stairs step over step at home because he doesn't have handrails.          OBJECTIVE    Modality rationale: decrease inflammation and decrease pain to improve the patients ability to ambulate without buckling   Min Type Additional Details    [] Estim:  []Unatt       []IFC  []Premod                        []Other:  []w/ice   []w/heat  Position:  Location:    [] Estim: []Att    []TENS instruct  []NMES                    []Other:  []w/US   []w/ice   []w/heat  Position:  Location:    []  Traction: [] Cervical       []Lumbar                       [] Prone          []Supine                       []Intermittent   []Continuous Lbs:  [] before manual  [] after manual    []  Ultrasound: []Continuous   [] Pulsed                           []1MHz   []3MHz W/cm2:  Location:    []  Iontophoresis with dexamethasone         Location: [] Take home patch   [] In clinic []  Ice     []  heat  []  Ice massage  []  Laser   []  Anodyne Position:  Location:    []  Laser with stim  []  Other:  Position:  Location:   15 [x]  Vasopneumatic Device Pressure:       [] lo [x] med [] hi   Temperature: [x] lo [] med [] hi   [x] Skin assessment post-treatment:  [x]intact []redness- no adverse reaction    []redness - adverse reaction:     26 min Therapeutic Exercise:  [x] See flow sheet :   Rationale: increase ROM and increase strength to improve the patients ability to ambulate with decreased buckling and prepare for return to work duties    15 minutes for gentle tibial distraction and anterior/posterior grade III glides; patella mobs superior/inferior grade III, articularis genu strumming; stick roll to quads lateral; MWM of the tibia during pt step lunge stretch to improve knee flexion          With   [] TE   [] TA   [] neuro   [] other: Patient Education: [x] Review HEP    [] Progressed/Changed HEP based on:   [] positioning   [] body mechanics   [] transfers   [] heat/ice application    [] other:      Other Objective/Functional Measures:   Left knee flexion post manual 115 degrees with some pain end range  Improved ease of ambulation without cane after session  Educated on tennis ball massage to quads for stiffness and continued icing to decrease inflammation  Reviewed making sure to not overdo himself with exercises which could contribute to increased swelling/pain  Discussed working on stairs with decreased use of UE in upcoming sessions for stairs at home     Pain Level (0-10 scale) post treatment: 2/10    ASSESSMENT/Changes in Function: Pt's knee pain/swelling beginning to calm down since recent flare up likely due to excessive compliance of HEP. Pt has some lateral quad tightness and decreased IR of the tibia during end range knee flexion limiting some mobility.  Will continue working also on strength with decreased pain to improve ease of stepping up for both stairs and getting in/out of vehicles to be able to return to his job with [employer]. Patient will continue to benefit from skilled PT services to modify and progress therapeutic interventions, address functional mobility deficits, address ROM deficits, address strength deficits, analyze and address soft tissue restrictions, analyze and cue movement patterns, analyze and modify body mechanics/ergonomics, assess and modify postural abnormalities, address imbalance/dizziness and instruct in home and community integration to attain remaining goals. Progress towards goals / Updated goals:  1. Pt will improve FOTO by 16 points in order to demonstrate functional improvement  2. Pt will improve left knee flexion AROM to 115 dgrs in order to improve gait pattern and ease of work tasks. Progressin dgrs 18 116 degrees post manual (18)  3. Pt will improve left knee MMT to 5/5 in order to improve ease of work tasks . 3/5 L (2018)  4.  Pt will report no difficulty with car transfers in order to perform work tasks progressing some difficulty on 's side ()       PLAN  [x]  Upgrade activities as tolerated     [x]  Continue plan of care  []  Update interventions per flow sheet       []  Discharge due to:_  []  Other:_      Baciliojanneth Alas, PTA 2018  9:58 AM    Future Appointments  Date Time Provider Dex [place]   2018 10:30 AM Bacilio Arch, PTA [facility]   2018 10:30 AM Sofi Lundberg PT [facility]   2018 11:00 AM Bacilio Arch, PTA [facility]   2018 11:00 AM Bacilio Arch, PTA [facility]   2018 9:15 AM Jaime Chavez PA-C [facility]   6/15/2018 10:30 AM Sofi Lundberg, PT [id number] [facility]   2018 3:30 PM Jakob Chen  [address]   2018 4:00 PM Raciel Soto  [address]

## 2018-06-08 ENCOUNTER — HOSPITAL ENCOUNTER (OUTPATIENT)
Dept: PHYSICAL THERAPY | Age: 63
Discharge: HOME | End: 2018-06-08
Payer: MEDICARE

## 2018-06-08 PROCEDURE — G8979 MOBILITY GOAL STATUS: HCPCS

## 2018-06-08 PROCEDURE — G8978 MOBILITY CURRENT STATUS: HCPCS

## 2018-06-08 PROCEDURE — 97110 THERAPEUTIC EXERCISES: CPT

## 2018-06-08 NOTE — PROGRESS NOTES
PT DAILY TREATMENT NOTE - [facility]     Patient Name: Amira Proper  Date:2018  : 1955  [x]  Patient  Verified  Payor: BLUE CROSS / Plan:  [facility] / Product Type: PPO /    In time:10:29  Out time:11:15  Total Treatment Time (min): 46  Total Timed Codes (min): 36  1:1 Treatment Time ( only): 36   Visit #: 8 of     Treatment Area: Pain in left knee [M25.562]  Other acute postprocedural pain [G89.18]    SUBJECTIVE  Pain Level (0-10 scale): 2/10  Any medication changes, allergies to medications, adverse drug reactions, diagnosis change, or new procedure performed?: [x] No    [] Yes (see summary sheet for update)  Subjective functional status/changes:   [] No changes reported  Pt reports feeling stiffer today but not too much pain. OBJECTIVE    Modality rationale: decrease edema, decrease inflammation and decrease pain to improve the patients ability to perform ADL's.     Min Type Additional Details    [] Estim:  []Unatt       []IFC  []Premod                        []Other:  []w/ice   []w/heat  Position:  Location:    [] Estim: []Att    []TENS instruct  []NMES                    []Other:  []w/US   []w/ice   []w/heat  Position:  Location:    []  Traction: [] Cervical       []Lumbar                       [] Prone          []Supine                       []Intermittent   []Continuous Lbs:  [] before manual  [] after manual    []  Ultrasound: []Continuous   [] Pulsed                           []1MHz   []3MHz W/cm2:  Location:    []  Iontophoresis with dexamethasone         Location: [] Take home patch   [] In clinic   10 [x]  Ice     []  heat  []  Ice massage  []  Laser   []  Anodyne Position: supine  Location: over left knee    []  Laser with stim  []  Other:  Position:  Location:    []  Vasopneumatic Device Pressure:       [] lo [] med [] hi   Temperature: [] lo [] med [] hi   [x] Skin assessment post-treatment:  [x]intact [x]redness- no adverse reaction    []redness - adverse reaction:     36 min Therapeutic Exercise:  [x] See flow sheet :   Rationale: increase ROM, increase strength, improve coordination, improve balance and increase proprioception to improve the patients ability to return to work. With   [x] TE   [] TA   [] neuro   [] other: Patient Education: [x] Review HEP    [] Progressed/Changed HEP based on:   [] positioning   [] body mechanics   [] transfers   [] heat/ice application    [] other:      Other Objective/Functional Measures:   Pt struggled with sit to stands today and eccentric step-downs. He felt a good stretch with lunges on an 8 in. Step. Pt was challenged with Romberg on foam EC. Pain Level (0-10 scale) post treatment: 3/10    ASSESSMENT/Changes in Function:      []  See Plan of Care  [x]  See progress note/recertification  []  See Discharge Summary         Progress towards goals / Updated goals:  1. Pt will improve FOTO by 16 points in order to demonstrate functional improvement  2. Pt will improve left knee flexion AROM to 115 dgrs in order to improve gait pattern and ease of work tasks. Progressin dgrs (18) 116 degrees post manual (18)  3. Pt will improve left knee MMT to 5/5 in order to improve ease of work tasks . 3+/5 L (2018)  4. Pt will report no difficulty with car transfers in order to perform work tasks. Progressing: some difficulty when swollen but otherwise okay.  (2018)    PLAN  [x]  Upgrade activities as tolerated     [x]  Continue plan of care  []  Update interventions per flow sheet       []  Discharge due to:_  []  Other:_      [place] Ayoub 2018  10:41 AM    Future Appointments  Date Time Provider Dex [place]   2018 11:00 AM Deborah Alba PTA [facility]   2018 11:00 AM Deborah Alba PTA [facility]   2018 9:15 AM Radha Somers PA-C [facility]ED   6/15/2018 10:30 AM Jacqualine Schaumann, PT [id number] [facility]   2018 3:30 PM Jana Germain MD [facility] SCHED   8/30/2018 4:00 PM Janel Corley  [address] [place]

## 2018-06-08 NOTE — PROGRESS NOTES
[facility] - [facility]  [address] [place] [address]  [phone] fax    Continued Plan of Care/ Re-certification for Physical Therapy Services    Patient name: Sara Watson Start of Care: 2018   Referral source: Jared Skinner MD : 1955   Medical/Treatment Diagnosis: Pain in left knee [M25.562]  Other acute postprocedural pain [G89.18] Onset Date: 3/27/18     Prior Hospitalization: see medical history Provider#: [id number]   Medications: Verified on Patient Summary List    Comorbidities: HTN, cervical/lumbar spinal stenosis with fusions, arthritis, hx of DVTs, torn left RTC - planning on surgery once left TKA is healed  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), lives with wife in a one story home with 3 steps to enter without HR, SPC for ambulation for 2 months prior to surgery  Visits from Start of Care: 8                                                                              Missed Visits: 0    The Plan of Care and following information is based on the patient's current status:  Goal: Pt will improve FOTO by 16 points in order to demonstrate functional improvement  Status at last note/certification: No change  Current Status: not met    Goal: Pt will improve left knee flexion AROM to 115 dgrs in order to improve gait pattern and ease of work tasks. Status at last note/certification:2-110 degrees  Current Status: not met    Goal: Pt will improve left knee MMT to 5/5 in order to improve ease of work tasks .   Status at last note/certification: 3+/5  Current Status: not met    Goal: Pt will report no difficulty with car transfers in order to perform work tasks. Status at last note/certification: Pt reports only difficult if swollen.    Current Status: met    Key functional changes: improving static balance, ease with walking outdoors, car transfers, and improving knee flexion AROM Problems/ barriers to goal attainment: periodic edema, quadriceps strength, mild pain     Problem List: pain affecting function, decrease ROM, decrease strength, edema affecting function, impaired gait/ balance, decrease ADL/ functional abilitiies, decrease activity tolerance and decrease flexibility/ joint mobility    Treatment Plan: Therapeutic exercise, Therapeutic activities, Neuromuscular re-education, Physical agent/modality, Gait/balance training, Manual therapy, Patient education, Functional mobility training, Home safety training and Stair training     Patient Goal (s) has been updated and includes: get motion back and work on stairs    Goals for this certification period to be accomplished in 10 treatments:  1. Pt will improve FOTO by 16 points in order to demonstrate functional improvement. 2. Pt will improve left knee flexion AROM to 120 dgrs in order to improve gait pattern and ease of work tasks  3. Pt will improve left knee MMT to 4+/5 in order to improve ease of work tasks. 4. Pt will ascend and descend 4 steps with reciprocal pattern to increase efficiency with stair negotiation.        Frequency / Duration: Patient to be seen 2 times per week for 10 treatments:    Assessment / Recommendations: Pt is making steady progress toward reaching his goals especially with knee flexion AROM. He continues to be limited by periodic swelling, but gives a good effort in all exercises. He reports less difficulty with walking, car transfers, and has shown improved balance while walking outdoors. He has made progress with strength, but continues to struggle with eccentric quadriceps control. He is making progress toward reciprocal stair negotiation, but still feels unsteady with performing this without railings. He has been educated on which home exercises to continue with and which he can discontinue.  Pt will benefit from continued skilled PT to address remaining ROM, strength, and functional mobility deficits to facilitate return to work and increased ease with ADL's. G-Codes (GP)  Mobility  [id number] Current  CK= 40-59%  [id number] Goal  CK= 40-59%      The severity rating is based on clinical judgment and the FOTO score. Certification Period: 06/08/2018-08/07/2018    Leah Hendricks 6/8/2018 6:56 PM    ________________________________________________________________________  I certify that the above Therapy Services are being furnished while the patient is under my care. I agree with the treatment plan and certify that this therapy is necessary. [] I have read the above and request that my patient continue as recommended.   [] I have read the above report and request that my patient continue therapy with the following changes/special instructions: _______________________________________  [] I have read the above report and request that my patient be discharged from therapy    Physician's Signature:________________________________Date:___________Time:__________    Please sign and return to [facility]  [address] [facility]  [phone] fax

## 2018-06-11 ENCOUNTER — HOSPITAL ENCOUNTER (OUTPATIENT)
Dept: PHYSICAL THERAPY | Age: 63
Discharge: HOME | End: 2018-06-11
Payer: MEDICARE

## 2018-06-11 PROCEDURE — 97110 THERAPEUTIC EXERCISES: CPT

## 2018-06-11 PROCEDURE — 97016 VASOPNEUMATIC DEVICE THERAPY: CPT

## 2018-06-11 NOTE — PROGRESS NOTES
PT DAILY TREATMENT NOTE - [facility]     Patient Name: Amira Proper  Date:2018  : 1955  [x]  Patient  Verified  Payor: [place] MEDICARE / Plan: [place] MEDICARE PART A & B / Product Type: Medicare /    In time: 11:00  Out time: 11:42  Total Treatment Time (min): 42  Total Timed Codes (min): 42  1:1 Treatment Time ( [address] only): 32  Visit #: 9 of     Treatment Area: Pain in left knee [M25.562]  Other acute postprocedural pain [G89.18]    SUBJECTIVE  Pain Level (0-10 scale): 3/10  Any medication changes, allergies to medications, adverse drug reactions, diagnosis change, or new procedure performed?: [x] No    [] Yes (see summary sheet for update)  Subjective functional status/changes:   [] No changes reported  Pt reports more swelling in his left knee today. He notes not doing his exercises over the weekend but did more walking and activities. He feels like he is going backwards with his progress. He sees the MD on .      OBJECTIVE    Modality rationale: decrease edema, decrease inflammation and decrease pain to improve the patients ability to ambulate with decreased buckling   Min Type Additional Details    [] Estim:  []Unatt       []IFC  []Premod                        []Other:  []w/ice   []w/heat  Position:  Location:    [] Estim: []Att    []TENS instruct  []NMES                    []Other:  []w/US   []w/ice   []w/heat  Position:  Location:    []  Traction: [] Cervical       []Lumbar                       [] Prone          []Supine                       []Intermittent   []Continuous Lbs:  [] before manual  [] after manual    []  Ultrasound: []Continuous   [] Pulsed                           []1MHz   []3MHz W/cm2:  Location:    []  Iontophoresis with dexamethasone         Location: [] Take home patch   [] In clinic    []  Ice     []  heat  []  Ice massage  []  Laser   []  Anodyne Position:   Location:     []  Laser with stim  []  Other:  Position:  Location:   During exercises [x]  Vasopneumatic Device Pressure:       [] lo [] med [x] hi   Temperature: [x] lo [] med [] hi   [x] Skin assessment post-treatment:  [x]intact []redness- no adverse reaction    []redness - adverse reaction:     42 min Therapeutic Exercise:  [x] See flow sheet :   Rationale: increase ROM, increase strength, improve coordination, improve balance and increase proprioception to improve the patients ability to ambulate and 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          With   [] TE   [] TA   [] neuro   [] other: Patient Education: [x] Review HEP    [] Progressed/Changed HEP based on:   [] positioning   [] body mechanics   [] transfers   [] heat/ice application    [] other:      Other Objective/Functional Measures:   Medial left knee swelling and redness; educated pt to track redness to ensure it doesn't spread given history  Currently warm, red, swollen and painful with walking  Worked on exercises in supine with game ready on  Educated to rest over next 1-2 days with ice and elevation  Applied tubing to left knee post game ready to help maintain decreased swelling   FOTO:41    Pain Level (0-10 scale) post treatment: 1/10    ASSESSMENT/Changes in Function: Pt is making slow progress towards updated goals. He continues to have moderate swelling with increased pain in the last 1-2 weeks. Pt has been educated to decrease frequency of exercise and activity to assess activity as a culprit to swelling. Will continue to monitor redness to rule out infection and further progress strength/stability for decreased swelling and pain. Pt needs to be able to climb into truck and other vehicles for return to work duties.      Patient will continue to benefit from skilled PT services to modify and progress therapeutic interventions, address functional mobility deficits, address ROM deficits, address strength deficits, analyze and address soft tissue restrictions, analyze and cue movement patterns, analyze and modify body mechanics/ergonomics, assess and modify postural abnormalities, address imbalance/dizziness and instruct in home and community integration to attain remaining goals. Goals for this certification period to be accomplished in 10 treatments:  1. Pt will improve FOTO by 16 points in order to demonstrate functional improvement. Not met 2 point improvement due to decline since last assessed (6/11/18)  2. Pt will improve left knee flexion AROM to 120 dgrs in order to improve gait pattern and ease of work tasks  3. Pt will improve left knee MMT to 4+/5 in order to improve ease of work tasks. 4. Pt will ascend and descend 4 steps with reciprocal pattern to increase efficiency with stair negotiation.      PLAN  [x]  Upgrade activities as tolerated     [x]  Continue plan of care  []  Update interventions per flow sheet       []  Discharge due to:_  []  Other:_      Gurpreet Ground, PTA 6/11/2018  9:59 AM    Future Appointments  Date Time Provider Dex [place]   6/11/2018 11:00 AM [place] Ground, PTA [facility]   6/13/2018 11:00 AM Gurpreet Ground, PTA [facility]   6/14/2018 9:15 AM Alan [facility]l [facility]-C [facility]   8/20/2018 3:30 PM Elvis Ortega  [address]   8/30/2018 4:00 PM Lori Fleming  [address] [place]

## 2018-06-13 ENCOUNTER — HOSPITAL ENCOUNTER (OUTPATIENT)
Dept: PHYSICAL THERAPY | Age: 63
Discharge: HOME | End: 2018-06-13
Payer: MEDICARE

## 2018-06-13 PROCEDURE — 97110 THERAPEUTIC EXERCISES: CPT

## 2018-06-13 PROCEDURE — 97016 VASOPNEUMATIC DEVICE THERAPY: CPT

## 2018-06-13 NOTE — PROGRESS NOTES
PT DAILY TREATMENT NOTE - [facility]     Patient Name: Mic Ochoa  Date:2018  : 1955  [x]  Patient  Verified  Payor: [place] MEDICARE / Plan: VA MEDICARE PART A & B / Product Type: Medicare /    In time:11:00  Out time:11:51  Total Treatment Time (min): 51  Total Timed Codes (min): 36  1:1 Treatment Time ( [address] only): 31  Visit #: 10 of     Treatment Area: Pain in left knee [M25.562]  Other acute postprocedural pain [G89.18]    SUBJECTIVE  Pain Level (0-10 scale): 2-310  Any medication changes, allergies to medications, adverse drug reactions, diagnosis change, or new procedure performed?: [x] No    [] Yes (see summary sheet for update)  Subjective functional status/changes:   [] No changes reported  Pt reports he rested yesterday and only did a few exercises in the afternoon. He notes the swelling went down but is still feeling a little stiffness. He has seen the redness calm down and goes to see the MD tomorrow. Pt has the most difficulty with stiffness when trying to get out of the car and getting up from chairs.      OBJECTIVE    Modality rationale: decrease edema, decrease inflammation and decrease pain to improve the patients ability to improve ease of ambulation and car transfers   Min Type Additional Details    [] Estim:  []Unatt       []IFC  []Premod                        []Other:  []w/ice   []w/heat  Position:  Location:    [] Estim: []Att    []TENS instruct  []NMES                    []Other:  []w/US   []w/ice   []w/heat  Position:  Location:    []  Traction: [] Cervical       []Lumbar                       [] Prone          []Supine                       []Intermittent   []Continuous Lbs:  [] before manual  [] after manual    []  Ultrasound: []Continuous   [] Pulsed                           []1MHz   []3MHz W/cm2:  Location:    []  Iontophoresis with dexamethasone         Location: [] Take home patch   [] In clinic    []  Ice     []  heat  []  Ice massage  []  Laser   []  Anodyne Position:  Location:    []  Laser with stim  []  Other:  Position:  Location:   15 [x]  Vasopneumatic Device Pressure:       [x] lo [] med [] hi   Temperature: [x] lo [] med [] hi   [x] Skin assessment post-treatment:  [x]intact []redness- no adverse reaction    []redness - adverse reaction:     36 min Therapeutic Exercise:  [x] See flow sheet :   Rationale: increase ROM, increase strength, improve coordination, improve balance and increase proprioception to improve the patients ability to ambulate and 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       With   [] TE   [] TA   [] neuro   [] other: Patient Education: [x] Review HEP    [] Progressed/Changed HEP based on:   [] positioning   [] body mechanics   [] transfers   [] heat/ice application    [] other:      Other Objective/Functional Measures:   Challenged with SAQ and with prone hip extensions  Decreased swelling/redness noted today in left knee  Educated to rest the remainder of today and ice/elevatd to keep swelling down  Will work on car transfers and sit to stand transfers in upcoming sessions per pt subjective reports  Decreased TKE strength     Pain Level (0-10 scale) post treatment: 1/10    ASSESSMENT/Changes in Function: Pt slowly progressing since most recent flare up of pain. He has decreased swelling/redness but some continued stiffness after prolonged sitting to get out of cars/trucks and up/down from chairs. Pt with decreased hip strength as well which could be contributing to instability in the left knee. 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      Patient will continue to benefit from skilled PT services to modify and progress therapeutic interventions, address functional mobility deficits, address ROM deficits, address strength deficits, analyze and address soft tissue restrictions, analyze and cue movement patterns, analyze and modify body mechanics/ergonomics, assess and modify postural abnormalities, address imbalance/dizziness and instruct in home and community integration to attain remaining goals. Goals for this certification period to be accomplished in 10 treatments:  1. Pt will improve FOTO by 16 points in order to demonstrate functional improvement. Not met 2 point improvement due to decline since last assessed (6/11/18)  2. Pt will improve left knee flexion AROM to 120 dgrs in order to improve gait pattern and ease of work tasks  3. Pt will improve left knee MMT to 4+/5 in order to improve ease of work tasks. 4. Pt will ascend and descend 4 steps with reciprocal pattern to increase efficiency with stair negotiation.      PLAN  [x]  Upgrade activities as tolerated     [x]  Continue plan of care  []  Update interventions per flow sheet       []  Discharge due to:_  []  Other:_      Bacilio Alas PTA 6/13/2018  9:54 AM    Future Appointments  Date Time Provider Dex [place]   6/13/2018 11:00 AM Bacilio Alas PTA [facility]   6/14/2018 9:15 AM Jaime Chavez PA-C [facility]   8/20/2018 3:30 PM Jakob Chen  [address]   8/30/2018 4:00 PM Raciel Soto  [address] [place]

## 2018-06-14 ENCOUNTER — OFFICE VISIT (OUTPATIENT)
Dept: ORTHOPEDIC SURGERY | Facility: CLINIC | Age: 63
End: 2018-06-14

## 2018-06-14 ENCOUNTER — TELEPHONE (OUTPATIENT)
Dept: INTERNAL MEDICINE CLINIC | Age: 63
End: 2018-06-14

## 2018-06-14 VITALS
TEMPERATURE: 96.9 F | DIASTOLIC BLOOD PRESSURE: 69 MMHG | OXYGEN SATURATION: 97 % | SYSTOLIC BLOOD PRESSURE: 131 MMHG | HEIGHT: 70 IN | WEIGHT: 222.8 LBS | RESPIRATION RATE: 18 BRPM | BODY MASS INDEX: 31.9 KG/M2 | HEART RATE: 85 BPM

## 2018-06-14 DIAGNOSIS — M25.562 LEFT KNEE PAIN, UNSPECIFIED CHRONICITY: ICD-10-CM

## 2018-06-14 DIAGNOSIS — M65.9 SYNOVITIS: Primary | ICD-10-CM

## 2018-06-14 RX ORDER — WARFARIN SODIUM 5 MG/1
TABLET ORAL
Qty: 75 TAB | Refills: 0 | Status: SHIPPED | OUTPATIENT
Start: 2018-06-14 | End: 2018-08-06 | Stop reason: SDUPTHER

## 2018-06-14 NOTE — PROGRESS NOTES
[address] [facility], [address] [facility]  [phone]           Patient: Scooby Mckeon                MRN: [id number]       SSN: [id number]  YOB: 1955        AGE: 70 y.o. SEX: male  Body mass index is 31.97 kg/(m^2). PCP: Analisa Corona MD  06/14/18      This office note has been dictated. REVIEW OF SYSTEMS:  Constitutional: Negative for fever, chills, weight loss and malaise/fatigue. HENT: Negative. Eyes: Negative. Respiratory: Negative. Cardiovascular: Negative. Gastrointestinal: No bowel incontinence or constipation. Genitourinary: No bladder incontinence or saddle anesthesia. Skin: Negative. Neurological: Negative. Endo/Heme/Allergies: Negative. Psychiatric/Behavioral: Negative. Musculoskeletal: As per HPI above.      Past Medical History:   Diagnosis Date    Arthritis     Bleeding     Blood clot in the legs     Chronic pain     knee and shoulder    Esophageal reflux     GERD (gastroesophageal reflux disease)     Headache(784.0)     migraine    High cholesterol     Hypertension     Lower back pain 11/6/2010    Other chest pain     Personal history of venous thrombosis and embolism     Pure hypercholesterolemia     Right buttock pain 11/6/2010    Right foot pain     Rotator cuff tear     left-since 2010, worsened tear Young.    Spinal stenosis     Tendonitis, tibialis     anterior    Thromboembolus ([facility] [address]     3 after sx last one 2000         Current Outpatient Prescriptions:     butalbital-acetaminophen-caff (FIORICET) -40 mg per capsule, Take 2 capsules every 8 hours as needed for headache, Disp: 90 Cap, Rfl: 3    warfarin (COUMADIN) 5 mg tablet, TAKE 2 AND 1/2 TABLETS BY MOUTH DAILY OR AS DIRECTED, Disp: 75 Tab, Rfl: 0    warfarin (COUMADIN) 3 mg tablet, Or as directed, Disp: 30 Tab, Rfl: 3    raNITIdine (ZANTAC) 150 mg tablet, Take 150 mg by mouth nightly., Disp: , Rfl: 5    tamsulosin (FLOMAX) 0.4 mg capsule, Take 1 Cap by mouth daily. , Disp: 30 Cap, Rfl: 2    celecoxib (CELEBREX) 200 mg capsule, Take 1 Cap by mouth two (2) times a day for 90 days. , Disp: 60 Cap, Rfl: 2    lisinopril-hydroCHLOROthiazide (PRINZIDE, ZESTORETIC) 20-12.5 mg per tablet, TAKE 1 TABLET BY MOUTH DAILY, Disp: 90 Tab, Rfl: 0    labetalol (NORMODYNE) 100 mg tablet, TAKE ONE TABLET BY MOUTH TWICE DAILY (Patient taking differently: TAKE ONE TABLET BY MOUTH DAILY), Disp: 180 Tab, Rfl: 0    metaxalone (SKELAXIN) 800 mg tablet, Take 1 Tab by mouth three (3) times daily. Indications: Muscle Spasm (Patient taking differently: Take 800 mg by mouth three (3) times daily as needed. Indications: Muscle Spasm), Disp: 90 Tab, Rfl: 2    montelukast (SINGULAIR) 10 mg tablet, TAKE 1 TABLET BY MOUTH EVERY DAY (Patient taking differently: TAKE 1 TABLET BY MOUTH EVERY HS), Disp: 90 Tab, Rfl: 0    lansoprazole (PREVACID) 30 mg capsule, TAKE 1 CAPSULE DAILY BEFOREBREAKFAST, Disp: 90 Cap, Rfl: 3    acetaminophen (TYLENOL) 500 mg tablet, Take 1,000 mg by mouth every six (6) hours as needed for Pain., Disp: , Rfl:     HYDROcodone-acetaminophen (NORCO)  mg tablet, Take 1 Tab by mouth every eight (8) hours as needed for Pain. Max Daily Amount: 3 Tabs., Disp: 40 Tab, Rfl: 0    azelastine (ASTELIN) 137 mcg (0.1 %) nasal spray, 1 spray up each nostril twice per day (Patient taking differently: 1 Spray by Both Nostrils route daily. Using once per day), Disp: 1 Bottle, Rfl: 1    Allergies   Allergen Reactions    Augmentin [Amoxicillin-Pot Clavulanate] Itching    Hibiclens [Chlorhexidine Gluconate] Itching    Penicillins Rash    Requip [Ropinirole] Nausea and Vomiting       Social History     Social History    Marital status:      Spouse name: N/A    Number of children: N/A    Years of education: N/A     Occupational History    Not on file.      Social History Main Topics    Smoking status: Never Smoker    Smokeless tobacco: Never Used    Alcohol use No    Drug use: No    Sexual activity: Not Currently     Other Topics Concern    Not on file     Social History Narrative       Past Surgical History:   Procedure Laterality Date    FOOT/TOES SURGERY PROC UNLISTED      HX BACK SURGERY      HX ORTHOPAEDIC  06-25-12    Right foot with excision of bursa and adipose tissue from fifth metatarsal base by Dr. Maura Guerin      lower back (1992 and 2000)    HX OTHER SURGICAL      left foot (2008)    LAMINOTOMY      NERVE BLOCK             SUBJECTIVE:   We did see Mr. Chaol Wheatley for followup in regard to his left total knee replacement. The patient is now approaching 3 month status post surgery. He has been doing pretty well. He has had a couple of episodes where his knee has swollen up after increased activity. It has gone down. He is still having a little bit of tenderness to the knee, a little of stiffness. He does continue physical therapy without complications. No fevers, chills, or injuries to report. PHYSICAL EXAMINATION: In general, the patient is alert and oriented x3 and is in no acute distress. The patient is well-developed and well-nourished. The patient is afebrile. HEENT:  Head is normocephalic and atraumatic. Extraocular eye movements are intact. Trachea is midline. No JVD is present. Breathing is nonlabored. Examination of  the left knee reveals skin intact. There is no erythema, ecchymosis. No warmth. There are no signs for infection or cellulitis present. Mild swelling noted. Range of motion is full. Very good stability. Patella tracks nicely without rubs or crepitus noted. ASSESSMENT:    Status post left knee replacement. Synovitis, left knee. PLAN:  At this point, we are going to move forward with obtaining basic labs, CBC, ESR, CRP,IL-6, and uric acid. If this blood tests are negative, he will continue on activities as tolerated.   I have asked him to elevate and ice if he does get a little bit of swelling. He will continue with Dr. Tommy Woods for his primary care. He does state he has a followup in regard to an enlarged lymph node as well as an elevated PSA. We will see him back after the labs for further evaluation. He is given another prescription today for physical therapy to continue.     CC:  [place] FremontMD Ailyn, PA-C, ATC

## 2018-06-14 NOTE — TELEPHONE ENCOUNTER
Spoke with patient and he advised that dr Kelly Choudhury was going to put in an order for a duplex for his right leg

## 2018-06-14 NOTE — TELEPHONE ENCOUNTER
Patient came in wanting Dr Yvon Merritt or a Nurse to call him he has questions concerning something they saw on the Duplex he had done on 04/17/2018.   Please call him at [phone]

## 2018-06-18 ENCOUNTER — HOSPITAL ENCOUNTER (OUTPATIENT)
Dept: LAB | Age: 63
Discharge: HOME | End: 2018-06-18
Payer: MEDICARE

## 2018-06-18 DIAGNOSIS — M65.9 SYNOVITIS: ICD-10-CM

## 2018-06-18 LAB
BASOPHILS # BLD: 0 K/UL (ref 0–0.1)
BASOPHILS NFR BLD: 1 % (ref 0–2)
CRP SERPL-MCNC: 0.6 MG/DL (ref 0–0.3)
DIFFERENTIAL METHOD BLD: ABNORMAL
EOSINOPHIL # BLD: 0.1 K/UL (ref 0–0.4)
EOSINOPHIL NFR BLD: 2 % (ref 0–5)
ERYTHROCYTE [DISTWIDTH] IN BLOOD BY AUTOMATED COUNT: 13.6 % (ref 11.6–14.5)
ERYTHROCYTE [SEDIMENTATION RATE] IN BLOOD: 6 MM/HR (ref 0–20)
HCT VFR BLD AUTO: 37.7 % (ref 36–48)
HGB BLD-MCNC: 12.6 G/DL (ref 13–16)
LYMPHOCYTES # BLD: 1.4 K/UL (ref 0.9–3.6)
LYMPHOCYTES NFR BLD: 26 % (ref 21–52)
MCH RBC QN AUTO: 30.5 PG (ref 24–34)
MCHC RBC AUTO-ENTMCNC: 33.4 G/DL (ref 31–37)
MCV RBC AUTO: 91.3 FL (ref 74–97)
MONOCYTES # BLD: 0.8 K/UL (ref 0.05–1.2)
MONOCYTES NFR BLD: 15 % (ref 3–10)
NEUTS SEG # BLD: 3.1 K/UL (ref 1.8–8)
NEUTS SEG NFR BLD: 56 % (ref 40–73)
PLATELET # BLD AUTO: 192 K/UL (ref 135–420)
PMV BLD AUTO: 11.3 FL (ref 9.2–11.8)
RBC # BLD AUTO: 4.13 M/UL (ref 4.7–5.5)
URATE SERPL-MCNC: 6.4 MG/DL (ref 2.6–7.2)
WBC # BLD AUTO: 5.5 K/UL (ref 4.6–13.2)

## 2018-06-18 PROCEDURE — 85652 RBC SED RATE AUTOMATED: CPT | Performed by: PHYSICIAN ASSISTANT

## 2018-06-18 PROCEDURE — 83520 IMMUNOASSAY QUANT NOS NONAB: CPT | Performed by: PHYSICIAN ASSISTANT

## 2018-06-18 PROCEDURE — 86140 C-REACTIVE PROTEIN: CPT | Performed by: PHYSICIAN ASSISTANT

## 2018-06-18 PROCEDURE — 36415 COLL VENOUS BLD VENIPUNCTURE: CPT | Performed by: PHYSICIAN ASSISTANT

## 2018-06-18 PROCEDURE — 85025 COMPLETE CBC W/AUTO DIFF WBC: CPT | Performed by: PHYSICIAN ASSISTANT

## 2018-06-18 PROCEDURE — 84550 ASSAY OF BLOOD/URIC ACID: CPT | Performed by: PHYSICIAN ASSISTANT

## 2018-06-19 LAB — IL6 SERPL-MCNC: 3 PG/ML (ref 0–15.5)

## 2018-06-20 RX ORDER — NYSTATIN 100000 U/G
CREAM TOPICAL
Qty: 15 G | Refills: 0 | Status: SHIPPED | OUTPATIENT
Start: 2018-06-20 | End: 2018-08-30 | Stop reason: ALTCHOICE

## 2018-06-21 ENCOUNTER — HOSPITAL ENCOUNTER (OUTPATIENT)
Dept: PHYSICAL THERAPY | Age: 63
Discharge: HOME | End: 2018-06-21
Payer: MEDICARE

## 2018-06-21 PROCEDURE — 97110 THERAPEUTIC EXERCISES: CPT

## 2018-06-21 NOTE — PROGRESS NOTES
PT DAILY TREATMENT NOTE - [facility]     Patient Name: Julio Suh  Date:2018  : 1955  [x]  Patient  Verified  Payor: Lane Kassandrat / Plan: [place] MEDICARE PART A & B / Product Type: Medicare /    In time:10:30  Out time:11:10  Total Treatment Time (min): 40  Total Timed Codes (min): 30  1:1 Treatment Time ( [address] only): 30   Visit #: 11 of 12    Treatment Area: Pain in left knee [M25.562]  Other acute postprocedural pain [G89.18]    SUBJECTIVE  Pain Level (0-10 scale): 3/10  Any medication changes, allergies to medications, adverse drug reactions, diagnosis change, or new procedure performed?: [x] No    [] Yes (see summary sheet for update)  Subjective functional status/changes:   [] No changes reported  Pt said when he went to the doctor they talked about continuing therapy and returning to work in another 2-3 depending on progress. He is having blood work done also to make sure he didn't have an infection, and will get the results next week. He noticed minimal swelling and not warm to the touch the past couple of days. However, he is having a little more pain today along the lateral left joint line. Pt is having to take care of his mother with dementia, so he's been a little more active lately.      OBJECTIVE    Modality rationale: decrease edema, decrease inflammation and decrease pain to improve the patients ability to perform ADL's   Min Type Additional Details    [] Estim:  []Unatt       []IFC  []Premod                        []Other:  []w/ice   []w/heat  Position:  Location:    [] Estim: []Att    []TENS instruct  []NMES                    []Other:  []w/US   []w/ice   []w/heat  Position:  Location:    []  Traction: [] Cervical       []Lumbar                       [] Prone          []Supine                       []Intermittent   []Continuous Lbs:  [] before manual  [] after manual    []  Ultrasound: []Continuous   [] Pulsed                           []1MHz   []3MHz W/cm2:  Location:    [] Iontophoresis with dexamethasone         Location: [] Take home patch   [] In clinic   10 [x]  Ice     []  heat  []  Ice massage  []  Laser   []  Anodyne Position: supine  Location: left knee    []  Laser with stim  []  Other:  Position:  Location:    []  Vasopneumatic Device Pressure:       [] lo [] med [] hi   Temperature: [] lo [] med [] hi   [x] Skin assessment post-treatment:  [x]intact []redness- no adverse reaction    []redness - adverse reaction:     30 min Therapeutic Exercise:  [x] See flow sheet :   Rationale: increase ROM and increase strength to improve the patients ability to return to work duties. With   [x] TE   [] TA   [] neuro   [] other: Patient Education: [x] Review HEP    [] Progressed/Changed HEP based on:   [] positioning   [] body mechanics   [] transfers   [] heat/ice application    [] other:      Other Objective/Functional Measures:   Pt was challenged with knee extension machine and had slight increase in pain along left lateral joint line. Pt was compensating for decreased left quad strength by hyperextending left knee during step ups. He also was circumducting hip to get feet on the step, so next visit lower step to facilitate correct form. Pt notes he still has problems with getting into the car more than getting out of the car, especially after long rides. Pain Level (0-10 scale) post treatment: 2/10    ASSESSMENT/Changes in Function: Pt is making slow progress toward goals. Pt struggles with intermittent swelling and mild pain in the left knee, causing him to continue with decreased left knee mobility and strength. Pt has decreased left knee extension strength, causing compensation of hyperextension. Pt was educated on trying to control the knee to prevent this. Pt continues to give good effort in therapy despite pain. Pt was educated to try and rest at home as much as possible, but continue HEP.      Patient will continue to benefit from skilled PT services to modify and progress therapeutic interventions, address functional mobility deficits, address ROM deficits, address strength deficits, analyze and address soft tissue restrictions, analyze and cue movement patterns and address imbalance/dizziness to attain remaining goals. Progress towards goals / Updated goals:  1. Pt will improve FOTO by 16 points in order to demonstrate functional improvement. Not met 2 point improvement due to decline since last assessed (6/11/18)  2. Pt will improve left knee flexion AROM to 120 dgrs in order to improve gait pattern and ease of work tasks  3. Pt will improve left knee MMT to 4+/5 in order to improve ease of work tasks. 4. Pt will ascend and descend 4 steps with reciprocal pattern to increase efficiency with stair negotiation.     PLAN  []  Upgrade activities as tolerated     [x]  Continue plan of care  []  Update interventions per flow sheet       []  Discharge due to:_  []  Other:_      Alta Salinas 6/21/2018  10:21 AM    Future Appointments  Date Time Provider Dex Alysia   6/21/2018 10:30 AM Esperanza Slater PT [id number]   6/22/2018 11:00 AM Gurpreet Sagastume, PTA [id number]   6/28/2018 10:00 AM Alan Morel PA-C [facility]   7/3/2018 2:00 PM Lakshmi Garrido MD [address] [facility]   8/20/2018 3:30 PM Elvis Ortega  [address]    8/30/2018 4:00 PM Lori Fleming  [address] [place]

## 2018-06-22 ENCOUNTER — HOSPITAL ENCOUNTER (OUTPATIENT)
Dept: PHYSICAL THERAPY | Age: 63
Discharge: HOME | End: 2018-06-22
Payer: MEDICARE

## 2018-06-22 PROCEDURE — 97110 THERAPEUTIC EXERCISES: CPT

## 2018-06-22 PROCEDURE — 97530 THERAPEUTIC ACTIVITIES: CPT

## 2018-06-22 NOTE — PROGRESS NOTES
PT DAILY TREATMENT NOTE - [facility]     Patient Name: Rena Balbuena  Date:2018  : 1955  [x]  Patient  Verified  Payor: [place] MEDICARE / Plan: [place] MEDICARE PART A & B / Product Type: Medicare /    In time:11:00  Out time:11:43  Total Treatment Time (min): 43  Total Timed Codes (min): 33  1:1 Treatment Time ( [address] only): 33   Visit #: 12 of 12    Treatment Area: Pain in left knee [M25.562]  Other acute postprocedural pain [G89.18]    SUBJECTIVE  Pain Level (0-10 scale): 2/10  Any medication changes, allergies to medications, adverse drug reactions, diagnosis change, or new procedure performed?: [x] No    [] Yes (see summary sheet for update)  Subjective functional status/changes:   [] No changes reported  Pt reports pain being a little bit better than yesterday, but it's still there in the same spot. It's a sharp pain when he does feel it. He was able to take it easy after therapy yesterday.      OBJECTIVE    Modality rationale: decrease edema, decrease inflammation and decrease pain to improve the patients ability to perform ADL's   Min Type Additional Details    [] Estim:  []Unatt       []IFC  []Premod                        []Other:  []w/ice   []w/heat  Position:  Location:    [] Estim: []Att    []TENS instruct  []NMES                    []Other:  []w/US   []w/ice   []w/heat  Position:  Location:    []  Traction: [] Cervical       []Lumbar                       [] Prone          []Supine                       []Intermittent   []Continuous Lbs:  [] before manual  [] after manual    []  Ultrasound: []Continuous   [] Pulsed                           []1MHz   []3MHz W/cm2:  Location:    []  Iontophoresis with dexamethasone         Location: [] Take home patch   [] In clinic   10 [x]  Ice     []  heat  []  Ice massage  []  Laser   []  Anodyne Position: supine with head of bed elevated  Location: left knee    []  Laser with stim  []  Other:  Position:  Location:    []  Vasopneumatic Device Pressure:       [] lo [] med [] hi   Temperature: [] lo [] med [] hi   [] Skin assessment post-treatment:  []intact []redness- no adverse reaction    []redness - adverse reaction:     25 min Therapeutic Exercise:  [x] See flow sheet :   Rationale: increase ROM, increase strength and improve coordination to improve the patients ability to return to work duties. 8 min Therapeutic Activity:  [x]  See flow sheet : outdoor ambulation, stepping up and down from curbs with good quad control   Rationale: increase strength, improve coordination and improve balance  to improve the patients ability to ambulate outdoors. With   [x] TE   [] TA   [] neuro   [] other: Patient Education: [x] Review HEP    [] Progressed/Changed HEP based on:   [] positioning   [] body mechanics   [] transfers   [x] heat/ice application    [] other:      Other Objective/Functional Measures:   Left knee AROM: 110 AROM, 113 PROM  Left knee extension MMT: 4/5  Pt slight discomfort with sit to stands but had good form and control. Pt had no problems with SAQ today. Pt didn't have a car here today to work on car transfers, so we will try this next time. Pt continues to need cueing to not hyperextend left knee during step-ups on the curb outside. Pt had fair eccentric quad control on stepping down from a curb outside. Pain Level (0-10 scale) post treatment: 2/10    ASSESSMENT/Changes in Function: Pt continues to have pain in the same spot of his knee, particularly with any quadriceps muscle contractions. Pt continues to struggle to avoid hyperextension during step-ups, but is trying to focus on this. Pt continues to report difficulty with car transfers, but car was unavailable to practice today. Continue to address quadriceps strength and knee flexion AROM to improve ease with ADL's and return to work duties.     Patient will continue to benefit from skilled PT services to modify and progress therapeutic interventions, address functional mobility deficits, address ROM deficits, address strength deficits, analyze and address soft tissue restrictions, analyze and cue movement patterns, analyze and modify body mechanics/ergonomics and address imbalance/dizziness to attain remaining goals. Progress towards goals / Updated goals:  1. Pt will improve FOTO by 16 points in order to demonstrate functional improvement. Not met 2 point improvement due to decline since last assessed (18)  2. Pt will improve left knee flexion AROM to 120 dgrs in order to improve gait pattern and ease of work tasks. Progressin degrees AROM, 113 degrees PROM (18)  3. Pt will improve left knee MMT to 4+/5 in order to improve ease of work tasks. Progressin/5 (19)  4. Pt will ascend and descend 4 steps with reciprocal pattern to increase efficiency with stair negotiation. PLAN  []  Upgrade activities as tolerated     [x]  Continue plan of care  []  Update interventions per flow sheet       []  Discharge due to:_  []  Other:_      Ernestoon Diego 2018  11:00 AM  I was present during the entire treatment, directing and participating in the treatment.    Sasha Mijares DPT      Future Appointments  Date Time Provider Dex Hatfield   2018 11:00 AM Josue Jacques PTA [facility]   2018 2:30 PM Ted Pantoja MD [facility]   2018 11:00 AM Josue Jacques PTA [facility]   2018 11:30 AM Josue Jacques PTA [facility]   2018 10:00 AM Alisson Mann PA-C [facility]   2018 11:30 AM Josue Jacques PTA [facility]   7/3/2018 2:00 PM Amy Liu MD [address] [place] [address]   2018 10:30 AM [facility]   2018 10:30 AM [facility] PT [facility] [address] [facility]   2018 10:30 AM [facility]   2018 3:30 PM Karyn Reardon  [address]   2018 4:00 PM Ted Pantoja MD [facility]

## 2018-06-25 ENCOUNTER — OFFICE VISIT (OUTPATIENT)
Dept: INTERNAL MEDICINE CLINIC | Age: 63
End: 2018-06-25

## 2018-06-25 ENCOUNTER — HOSPITAL ENCOUNTER (OUTPATIENT)
Dept: PHYSICAL THERAPY | Age: 63
Discharge: HOME | End: 2018-06-25
Payer: MEDICARE

## 2018-06-25 VITALS
SYSTOLIC BLOOD PRESSURE: 142 MMHG | HEART RATE: 90 BPM | HEIGHT: 70 IN | WEIGHT: 222 LBS | OXYGEN SATURATION: 98 % | BODY MASS INDEX: 31.78 KG/M2 | DIASTOLIC BLOOD PRESSURE: 72 MMHG | TEMPERATURE: 98.2 F

## 2018-06-25 DIAGNOSIS — B37.9 YEAST INFECTION: ICD-10-CM

## 2018-06-25 DIAGNOSIS — R59.0 LYMPHADENOPATHY, INGUINAL: Primary | ICD-10-CM

## 2018-06-25 PROCEDURE — 97110 THERAPEUTIC EXERCISES: CPT

## 2018-06-25 PROCEDURE — 97140 MANUAL THERAPY 1/> REGIONS: CPT

## 2018-06-25 RX ORDER — FLUCONAZOLE 100 MG/1
TABLET ORAL
Qty: 5 TAB | Refills: 0 | Status: SHIPPED | OUTPATIENT
Start: 2018-06-25 | End: 2018-07-03

## 2018-06-25 RX ORDER — CLOTRIMAZOLE AND BETAMETHASONE DIPROPIONATE 10; .64 MG/G; MG/G
CREAM TOPICAL
Qty: 45 G | Refills: 1 | Status: SHIPPED | OUTPATIENT
Start: 2018-06-25 | End: 2018-10-08

## 2018-06-25 NOTE — MR AVS SNAPSHOT
[facility] 
 
 
 [address], [address] [place] [address] 
[phone] Patient: Law Nicole MRN: [id number] OKP:[id number] Visit Information Date & Time Provider Department Dept. Phone Encounter #  
 6/25/2018  2:30 PM Rowan Munoz MD [facility] INTERNAL MEDICINE [facility] [phone] [id number] Your Appointments 6/28/2018 10:00 AM  
POST OP with Edu Riojas PA-C  
[facility] [address] [facility]) Appt Note: 1 wk f/u  
 [address], [address] [place]  
[phone]  
  
   
 [address], [facility] [address]  
  
    
 7/3/2018  2:00 PM  
New Patient with Sofia Jiménez MD  
[facility] [address] [place] [address]) Appt Note: elevated psa 5.3  
 [address] [address]  
[phone] [address] [phone]  
  
    
 8/20/2018  3:30 PM  
Follow Up with Anny Reagan MD  
[facility] [address] [facility]) Appt Note: 3 MO F/U  
 [address] [place] [address]  
[phone]  
  
   
 [address]  
  
    
 8/30/2018  4:00 PM  
Follow Up with Rowan Munoz MD  
[facility] [place] [address] [facility]) Appt Note: 3 mo f/u  
 [address], [address] [place] [address]  
  
   
 [address], [address] [facility] [address] Upcoming Health Maintenance Date Due Hepatitis C Screening 1955 ZOSTER VACCINE AGE 60> 2/13/2013 GLAUCOMA SCREENING Q2Y 4/13/2018 Pneumococcal 65+ Low/Medium Risk (1 of 2 - PCV13) 4/13/2018 MEDICARE YEARLY EXAM 5/2/2018 Influenza Age 5 to Adult 8/1/2018 DTaP/Tdap/Td series (2 - Td) 12/6/2024 COLONOSCOPY 5/27/2026 Allergies as of 6/25/2018  Review Complete On: 6/25/2018 By: Abhijeet Nugent LPN  
  
 Severity Noted Reaction Type Reactions Augmentin [Amoxicillin-pot Clavulanate]    Itching Hibiclens [Chlorhexidine Gluconate]  03/16/2018    Itching Penicillins    Rash Requip [Ropinirole]  05/30/2018    Nausea and Vomiting Current Immunizations  Reviewed on 6/22/2018 Name Date Tdap 12/6/2014 Not reviewed this visit You Were Diagnosed With   
  
 Codes Comments Lymphadenopathy, inguinal    -  Primary ICD-10-CM: R59.0 ICD-9-CM: 964. 6 Vitals BP Pulse Temp Height(growth percentile) 142/72 (BP 1 Location: Left arm, BP Patient Position: Sitting) 90 98.2 °F (36.8 °C) (Tympanic) 5' 10\" (1.778 m) Weight(growth percentile) SpO2 BMI Smoking Status 222 lb (100.7 kg) 98% 31.85 kg/m2 Never Smoker Vitals History BMI and BSA Data Body Mass Index Body Surface Area  
 31.85 kg/m 2 2.23 m 2 Preferred Pharmacy Pharmacy Name Phone [facility] [address] [place] [phone] Your Updated Medication List  
  
   
This list is accurate as of 6/25/18  3:51 PM.  Always use your most recent med list.  
  
  
  
  
 acetaminophen 500 mg tablet Commonly known as:  TYLENOL Take 1,000 mg by mouth every six (6) hours as needed for Pain. azelastine 137 mcg (0.1 %) nasal spray Commonly known as:  ASTELIN  
1 spray up each nostril twice per day  
  
 butalbital-acetaminophen-caff -40 mg per capsule Commonly known as:  [employer] Take 2 capsules every 8 hours as needed for headache  
  
 celecoxib 200 mg capsule Commonly known as:  CELEBREX Take 1 Cap by mouth two (2) times a day for 90 days. HYDROcodone-acetaminophen  mg tablet Commonly known as:  [facility] Take 1 Tab by mouth every eight (8) hours as needed for Pain. Max Daily Amount: 3 Tabs. labetalol 100 mg tablet Commonly known as:  NORMODYNE  
TAKE ONE TABLET BY MOUTH TWICE DAILY lansoprazole 30 mg capsule Commonly known as:  PREVACID TAKE 1 CAPSULE DAILY BEFOREBREAKFAST  
  
 lisinopril-hydroCHLOROthiazide 20-12.5 mg per tablet Commonly known as:  PRINZIDE, ZESTORETIC  
TAKE 1 TABLET BY MOUTH DAILY  
  
 metaxalone 800 mg tablet Commonly known as:  SKELAXIN Take 1 Tab by mouth three (3) times daily. Indications: Muscle Spasm  
  
 montelukast 10 mg tablet Commonly known as:  SINGULAIR  
TAKE 1 TABLET BY MOUTH EVERY DAY  
  
 nystatin topical cream  
Commonly known as:  MYCOSTATIN  
APPLY EXTERNALLY TO THE AFFECTED AREA TWICE DAILY  
  
 raNITIdine 150 mg tablet Commonly known as:  ZANTAC Take 150 mg by mouth nightly. tamsulosin 0.4 mg capsule Commonly known as:  FLOMAX Take 1 Cap by mouth daily. * warfarin 3 mg tablet Commonly known as:  COUMADIN Or as directed * warfarin 5 mg tablet Commonly known as:  COUMADIN  
TAKE 2 AND 1/2 TABLETS BY MOUTH DAILY OR AS DIRECTED * Notice: This list has 2 medication(s) that are the same as other medications prescribed for you. Read the directions carefully, and ask your doctor or other care provider to review them with you. To-Do List   
 06/26/2018 Imaging:  CT PELV W CONT   
  
 06/27/2018 11:30 AM  
  Appointment with Pamela York PTA at [facility] PT [address] ([phone])  
  
 06/28/2018 11:30 AM  
  Appointment with Pamela York PTA at [facility] PT [address] ([phone])  
  
 06/29/2018 6:30 PM  
  Appointment with [facility] RM 1 at [facility] ([phone]) ORAL CONTRAST/PREP   Oral Contrast/Prep from radiology  no later than one day prior to study date/time.   DIET RESTRICTIONS  Nothing to eat or drink, 4 hours prior to study  May have water to take meds  May drink oral contrast if directed  GENERAL INSTRUCTIONS  If you were given premedications for IV contrast to take prior to having your study, please arrange to have someone drive you to your appointment. If you have had a creatinine level drawn within the past 30 days, please bring most recent results to your appt. MEDICATIONS  Bring a complete list of all medications you are currently taking to include prescriptions, over-the-counter meds, herbals, vitamins & any dietary supplements. RELATED STUDY INFORMATION  Bring any films, CDs, and reports related with you on the day of your exam.  This only includes studies done outside of [address] [place] [address], [facility], [place] [address], and [facility]. QUESTIONS  Notify the CT Department if you have any questions concerning your study. [address] [facility] - [phone]  
  
 07/06/2018 10:30 AM  
  Appointment with [facility] at [facility] PT [address] ([phone])  
  
 07/12/2018 10:30 AM  
  Appointment with [facility] PT [facility] [address] at [facility] PT [address]  ([phone])  
  
 07/19/2018 10:30 AM  
  Appointment with [facility] at [facility] PT [address] ([phone]) Referral Information Referral ID Referred By Referred To  
  
 [id number] Talita ORNELAS Not Available Visits Status Start Date End Date 1 New Request 6/25/18 6/25/19 If your referral has a status of pending review or denied, additional information will be sent to support the outcome of this decision. Please provide this summary of care documentation to your next provider. Your primary care clinician is listed as Graham Mae. If you have any questions after today's visit, please call [phone].

## 2018-06-25 NOTE — PROGRESS NOTES
PT DAILY TREATMENT NOTE - [facility]     Patient Name: Lissy Mooney  Date:2018  : 1955  [x]  Patient  Verified  Payor: [place] MEDICARE / Plan: [place] MEDICARE PART A & B / Product Type: Medicare /    In time:11:00  Out time:11:50  Total Treatment Time (min): 50  Total Timed Codes (min): 50  1:1 Treatment Time ( [address] only): 28   Visit #: 1 of 10    Treatment Area: Pain in left knee [M25.562]  Other acute postprocedural pain [G89.18]    SUBJECTIVE  Pain Level (0-10 scale): 210  Any medication changes, allergies to medications, adverse drug reactions, diagnosis change, or new procedure performed?: [x] No    [] Yes (see summary sheet for update)  Subjective functional status/changes:   [] No changes reported  Pt reports he was doing a lot of walking and moving over the weekend. He notes pain on the outside of his left knee but the original pains are much better and the swelling is less. He would like to look at car transfers today (needs to climb into [employer] for work) and has not yet tried his steps at home that are tall without handrails. He had one episode over the weekend where his left buckled at [address] and feels he would have fallen if he didn't have his cane.      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 and prolonged standing/walking    10 minutes of manual therapy to stick roll the left LE evertors and tennis ball the to left DF to improve ease of crossing his leg and getting in/out of the car          With   [] TE   [] TA   [] neuro   [] other: Patient Education: [x] Review HEP    [] Progressed/Changed HEP based on:   [] positioning   [] body mechanics   [] transfers   [] heat/ice application    [] other:      Other Objective/Functional Measures:   TTP anterior tibialis and evertors of the left leg proximally  Improvement in crossing his legs and getting up from a seated position with less pain  Car transfers were performed with greater ease also post manual; combination of strength needed with flexion and adduction of the hip to pull his leg into the explorer  PD ice at end after wanting to look at transfers  Instructed in self massage to tibialis anterior and evertors and stretching of DF     Pain Level (0-10 scale) post treatment: 0/10    ASSESSMENT/Changes in Function: Pt is progressing with decreasing swelling and knee pain. He has some tightness of his evertors and tibialis anterior which is likely a combination of pes planus and increased attention to heel strike during ambulation. Will work on functional based strengthening to prepare for climbing into [employer] for work and negotiating his stairs at home safely without HRs. Patient will continue to benefit from skilled PT services to modify and progress therapeutic interventions, address functional mobility deficits, address ROM deficits, address strength deficits, analyze and address soft tissue restrictions, analyze and cue movement patterns, analyze and modify body mechanics/ergonomics, assess and modify postural abnormalities, address imbalance/dizziness and instruct in home and community integration to attain remaining goals. Progress towards goals / Updated goals:  1. Pt will improve FOTO by 16 points in order to demonstrate functional improvement. Not met 2 point improvement due to decline since last assessed (18)  2. Pt will improve left knee flexion AROM to 120 dgrs in order to improve gait pattern and ease of work tasks. Progressin degrees AROM, 113 degrees PROM (18)  3. Pt will improve left knee MMT to 4+/5 in order to improve ease of work tasks. Progressin/5 (19)  4.  Pt will ascend and descend 4 steps with reciprocal pattern to increase efficiency with stair negotiation.     PLAN  [x]  Upgrade activities as tolerated     [x]  Continue plan of care  []  Update interventions per flow sheet       []  Discharge due to:_  [x]  Other: work on high step ups with cane or no AD     Jaynie Canavan, PTA 6/25/2018  10:22 AM    Future Appointments  Date Time Provider Dex [place]field   6/25/2018 11:00 AM Jaynie Canavan, PTA [facility]   6/25/2018 2:30 PM MD [address] [place]   6/26/2018 11:00 AM Jaynie Canavan, PTA [facility]   6/27/2018 11:30 AM Jaynie Canavan, PTA [facility]   6/28/2018 10:00 AM Magdalena Encinas PA-C [facility]   6/28/2018 11:30 AM Jaynie Canavan, PTA FZIBOTB [facility]   7/3/2018 2:00 PM Flavio Gowers, MD [address] [place] [address]   7/6/2018 10:30 AM [facility] [address] [facility]   7/12/2018 10:30 AM [facility] [address] [facility]   7/19/2018 10:30 AM [facility] [address] [facility]   8/20/2018 3:30 PM Onur Rene  [address]   8/30/2018 4:00 PM Netta Montgomery  [address] [place]

## 2018-06-26 ENCOUNTER — APPOINTMENT (OUTPATIENT)
Dept: PHYSICAL THERAPY | Age: 63
End: 2018-06-26
Payer: MEDICARE

## 2018-06-27 ENCOUNTER — HOSPITAL ENCOUNTER (OUTPATIENT)
Dept: PHYSICAL THERAPY | Age: 63
Discharge: HOME | End: 2018-06-27
Payer: MEDICARE

## 2018-06-27 PROCEDURE — 97110 THERAPEUTIC EXERCISES: CPT

## 2018-06-27 PROCEDURE — 97530 THERAPEUTIC ACTIVITIES: CPT

## 2018-06-27 NOTE — PROGRESS NOTES
The patient presents to the office today with the chief complaint of enlarged LN right groin    HPI    The patient is status post a duplex of his legs that revealed an enlarged LN in his right groin. The patient notes a rash in his groin. Review of Systems   Respiratory: Negative for shortness of breath. Cardiovascular: Negative for chest pain and leg swelling. Allergies   Allergen Reactions    Augmentin [Amoxicillin-Pot Clavulanate] Itching    Hibiclens [Chlorhexidine Gluconate] Itching    Penicillins Rash    Requip [Ropinirole] Nausea and Vomiting       Current Outpatient Prescriptions   Medication Sig Dispense Refill    clotrimazole-betamethasone (LOTRISONE) topical cream Apply twice per day 45 g 1    fluconazole (DIFLUCAN) 100 mg tablet 1 tab daily 5 Tab 0    nystatin (MYCOSTATIN) topical cream APPLY EXTERNALLY TO THE AFFECTED AREA TWICE DAILY 15 g 0    warfarin (COUMADIN) 5 mg tablet TAKE 2 AND 1/2 TABLETS BY MOUTH DAILY OR AS DIRECTED 75 Tab 0    butalbital-acetaminophen-caff (FIORICET) -40 mg per capsule Take 2 capsules every 8 hours as needed for headache 90 Cap 3    warfarin (COUMADIN) 3 mg tablet Or as directed 30 Tab 3    HYDROcodone-acetaminophen (NORCO)  mg tablet Take 1 Tab by mouth every eight (8) hours as needed for Pain. Max Daily Amount: 3 Tabs. 40 Tab 0    raNITIdine (ZANTAC) 150 mg tablet Take 150 mg by mouth nightly. 5    tamsulosin (FLOMAX) 0.4 mg capsule Take 1 Cap by mouth daily. 30 Cap 2    celecoxib (CELEBREX) 200 mg capsule Take 1 Cap by mouth two (2) times a day for 90 days.  60 Cap 2    lisinopril-hydroCHLOROthiazide (PRINZIDE, ZESTORETIC) 20-12.5 mg per tablet TAKE 1 TABLET BY MOUTH DAILY 90 Tab 0    labetalol (NORMODYNE) 100 mg tablet TAKE ONE TABLET BY MOUTH TWICE DAILY (Patient taking differently: TAKE ONE TABLET BY MOUTH DAILY) 180 Tab 0    azelastine (ASTELIN) 137 mcg (0.1 %) nasal spray 1 spray up each nostril twice per day (Patient taking differently: 1 Spray by Both Nostrils route daily. Using once per day) 1 Bottle 1    metaxalone (SKELAXIN) 800 mg tablet Take 1 Tab by mouth three (3) times daily. Indications: Muscle Spasm (Patient taking differently: Take 800 mg by mouth three (3) times daily as needed. Indications: Muscle Spasm) 90 Tab 2    montelukast (SINGULAIR) 10 mg tablet TAKE 1 TABLET BY MOUTH EVERY DAY (Patient taking differently: TAKE 1 TABLET BY MOUTH EVERY HS) 90 Tab 0    lansoprazole (PREVACID) 30 mg capsule TAKE 1 CAPSULE DAILY BEFOREBREAKFAST 90 Cap 3    acetaminophen (TYLENOL) 500 mg tablet Take 1,000 mg by mouth every six (6) hours as needed for Pain. Past Medical History:   Diagnosis Date    Arthritis     Bleeding     Blood clot in the legs     Chronic pain     knee and shoulder    Esophageal reflux     GERD (gastroesophageal reflux disease)     Headache(784.0)     migraine    High cholesterol     Hypertension     Lower back pain 11/6/2010    Other chest pain     Personal history of venous thrombosis and embolism     Pure hypercholesterolemia     Right buttock pain 11/6/2010    Right foot pain     Rotator cuff tear     left-since 2010, worsened tear Young.    Spinal stenosis     Tendonitis, tibialis     anterior    Thromboembolus ([facility] [address]     3 after sx last one 2000       Past Surgical History:   Procedure Laterality Date    FOOT/TOES SURGERY PROC UNLISTED      HX BACK SURGERY      HX ORTHOPAEDIC  06-25-12    Right foot with excision of bursa and adipose tissue from fifth metatarsal base by Dr. Sanjay Shah      lower back (1992 and 2000)    HX OTHER SURGICAL      left foot (2008)    LAMINOTOMY      NERVE BLOCK         Social History     Social History    Marital status:      Spouse name: N/A    Number of children: N/A    Years of education: N/A     Occupational History    Not on file.      Social History Main Topics    Smoking status: Never Smoker    Smokeless tobacco: Never Used    Alcohol use No    Drug use: No    Sexual activity: Not Currently     Other Topics Concern    Not on file     Social History Narrative       Patient does not have an advanced directive on file    Visit Vitals    /72 (BP 1 Location: Left arm, BP Patient Position: Sitting)    Pulse 90    Temp 98.2 °F (36.8 °C) (Tympanic)    Ht 5' 10\" (1.778 m)    Wt 222 lb (100.7 kg)    SpO2 98%    BMI 31.85 kg/m2       Physical Exam   Cardiovascular: Normal rate and regular rhythm. Exam reveals no gallop. No murmur heard. Pulmonary/Chest: He has no wheezes. He has no rales. Abdominal: Soft. He exhibits no distension. There is no tenderness. Lymphadenopathy:   No inguinal or femeral LN palpable       Hospital Outpatient Visit on 06/18/2018   Component Date Value Ref Range Status    C-Reactive protein 06/18/2018 0.6* 0 - 0.3 mg/dL Final    WBC 06/18/2018 5.5  4.6 - 13.2 K/uL Final    RBC 06/18/2018 4.13* 4.70 - 5.50 M/uL Final    HGB 06/18/2018 12.6* 13.0 - 16.0 g/dL Final    HCT 06/18/2018 37.7  36.0 - 48.0 % Final    MCV 06/18/2018 91.3  74.0 - 97.0 FL Final    MCH 06/18/2018 30.5  24.0 - 34.0 PG Final    MCHC 06/18/2018 33.4  31.0 - 37.0 g/dL Final    RDW 06/18/2018 13.6  11.6 - 14.5 % Final    PLATELET 55/66/6225 095  135 - 420 K/uL Final    MPV 06/18/2018 11.3  9.2 - 11.8 FL Final    NEUTROPHILS 06/18/2018 56  40 - 73 % Final    LYMPHOCYTES 06/18/2018 26  21 - 52 % Final    MONOCYTES 06/18/2018 15* 3 - 10 % Final    EOSINOPHILS 06/18/2018 2  0 - 5 % Final    BASOPHILS 06/18/2018 1  0 - 2 % Final    ABS. NEUTROPHILS 06/18/2018 3.1  1.8 - 8.0 K/UL Final    ABS. LYMPHOCYTES 06/18/2018 1.4  0.9 - 3.6 K/UL Final    ABS. MONOCYTES 06/18/2018 0.8  0.05 - 1.2 K/UL Final    ABS. EOSINOPHILS 06/18/2018 0.1  0.0 - 0.4 K/UL Final    ABS.  BASOPHILS 06/18/2018 0.0  0.0 - 0.1 K/UL Final    DF 06/18/2018 AUTOMATED    Final    Interleukin-6 (IL-6) 06/18/2018 3.0  0.0 - 15.5 pg/mL Final    Comment: (NOTE)  Results for this test are for research purposes only by the assay's  . The performance characteristics of this product have  not been established. Results should not be used as a diagnostic  procedure without confirmation of the diagnosis by another  medically established diagnostic product or procedure. Performed At: [address] [id number]  Javy Zambrano MD GA:[id number]      Sed rate, automated 06/18/2018 6  0 - 20 mm/hr Final    Uric acid 06/18/2018 6.4  2.6 - 7.2 MG/DL Final    Comment: The drugs N-acetylcysteine (NAC) and  Metamiszole have been found to cause falsely  low results in this chemical assay. Please  be sure to submit blood samples obtained  BEFORE administration of either of these  drugs to assure correct results. Hospital Outpatient Visit on 05/24/2018   Component Date Value Ref Range Status    WBC 05/24/2018 5.5  4.6 - 13.2 K/uL Final    RBC 05/24/2018 4.05* 4.70 - 5.50 M/uL Final    HGB 05/24/2018 12.4* 13.0 - 16.0 g/dL Final    HCT 05/24/2018 36.9  36.0 - 48.0 % Final    MCV 05/24/2018 91.1  74.0 - 97.0 FL Final    MCH 05/24/2018 30.6  24.0 - 34.0 PG Final    MCHC 05/24/2018 33.6  31.0 - 37.0 g/dL Final    RDW 05/24/2018 13.5  11.6 - 14.5 % Final    PLATELET 78/86/5181 935  135 - 420 K/uL Final    MPV 05/24/2018 10.7  9.2 - 11.8 FL Final    NEUTROPHILS 05/24/2018 62  40 - 73 % Final    LYMPHOCYTES 05/24/2018 23  21 - 52 % Final    MONOCYTES 05/24/2018 11* 3 - 10 % Final    EOSINOPHILS 05/24/2018 3  0 - 5 % Final    BASOPHILS 05/24/2018 1  0 - 2 % Final    ABS. NEUTROPHILS 05/24/2018 3.4  1.8 - 8.0 K/UL Final    ABS. LYMPHOCYTES 05/24/2018 1.3  0.9 - 3.6 K/UL Final    ABS. MONOCYTES 05/24/2018 0.6  0.05 - 1.2 K/UL Final    ABS. EOSINOPHILS 05/24/2018 0.2  0.0 - 0.4 K/UL Final    ABS.  BASOPHILS 05/24/2018 0.0  0.0 - 0.1 K/UL Final    DF 05/24/2018 AUTOMATED    Final    Sodium 05/24/2018 143  136 - 145 mmol/L Final    Potassium 05/24/2018 3.8  3.5 - 5.5 mmol/L Final    Chloride 05/24/2018 106  100 - 108 mmol/L Final    CO2 05/24/2018 29  21 - 32 mmol/L Final    Anion gap 05/24/2018 8  3.0 - 18 mmol/L Final    Glucose 05/24/2018 110* 74 - 99 mg/dL Final    BUN 05/24/2018 17  7.0 - 18 MG/DL Final    Creatinine 05/24/2018 1.03  0.6 - 1.3 MG/DL Final    BUN/Creatinine ratio 05/24/2018 17  12 - 20   Final    GFR est AA 05/24/2018 >60  >60 ml/min/1.73m2 Final    GFR est non-AA 05/24/2018 >60  >60 ml/min/1.73m2 Final    Comment: (NOTE)  Estimated GFR is calculated using the Modification of Diet in Renal   Disease (MDRD) Study equation, reported for both  Americans   (GFRAA) and non- Americans (GFRNA), and normalized to 1.73m2   body surface area. The physician must decide which value applies to   the patient. The MDRD study equation should only be used in   individuals age 25 or older. It has not been validated for the   following: pregnant women, patients with serious comorbid conditions,   or on certain medications, or persons with extremes of body size,   muscle mass, or nutritional status.  Calcium 05/24/2018 8.8  8.5 - 10.1 MG/DL Final    Bilirubin, total 05/24/2018 0.2  0.2 - 1.0 MG/DL Final    ALT (SGPT) 05/24/2018 24  16 - 61 U/L Final    AST (SGOT) 05/24/2018 14* 15 - 37 U/L Final    Alk.  phosphatase 05/24/2018 68  45 - 117 U/L Final    Protein, total 05/24/2018 6.0* 6.4 - 8.2 g/dL Final    Albumin 05/24/2018 3.5  3.4 - 5.0 g/dL Final    Globulin 05/24/2018 2.5  2.0 - 4.0 g/dL Final    A-G Ratio 05/24/2018 1.4  0.8 - 1.7   Final    Prostate Specific Ag 05/24/2018 5.3* 0.0 - 4.0 ng/mL Final    Prothrombin time 05/24/2018 28.1* 11.5 - 15.2 sec Final    INR 05/24/2018 2.7* 0.8 - 1.2   Final    Comment:            INR Therapeutic Ranges         (on stable oral anticoagulant):     INDICATION                INR  DVT/PE/Atrial Fib 2.0-3.0  MI/Mechanical Heart Valve  2.5-3.5     Anti-Coag visit on 05/17/2018   Component Date Value Ref Range Status    VALID INTERNAL CONTROL POC 05/17/2018 Yes   Final    INR POC 05/17/2018 2.8   Final   Hospital Outpatient Visit on 05/09/2018   Component Date Value Ref Range Status    C-Reactive protein 05/09/2018 0.7* 0 - 0.3 mg/dL Final    WBC 05/09/2018 5.5  4.6 - 13.2 K/uL Final    RBC 05/09/2018 3.96* 4.70 - 5.50 M/uL Final    HGB 05/09/2018 12.3* 13.0 - 16.0 g/dL Final    HCT 05/09/2018 36.0  36.0 - 48.0 % Final    MCV 05/09/2018 90.9  74.0 - 97.0 FL Final    MCH 05/09/2018 31.1  24.0 - 34.0 PG Final    MCHC 05/09/2018 34.2  31.0 - 37.0 g/dL Final    RDW 05/09/2018 13.5  11.6 - 14.5 % Final    PLATELET 33/05/7676 700  135 - 420 K/uL Final    MPV 05/09/2018 10.8  9.2 - 11.8 FL Final    NEUTROPHILS 05/09/2018 61  40 - 73 % Final    LYMPHOCYTES 05/09/2018 26  21 - 52 % Final    MONOCYTES 05/09/2018 9  3 - 10 % Final    EOSINOPHILS 05/09/2018 3  0 - 5 % Final    BASOPHILS 05/09/2018 1  0 - 2 % Final    ABS. NEUTROPHILS 05/09/2018 3.4  1.8 - 8.0 K/UL Final    ABS. LYMPHOCYTES 05/09/2018 1.4  0.9 - 3.6 K/UL Final    ABS. MONOCYTES 05/09/2018 0.5  0.05 - 1.2 K/UL Final    ABS. EOSINOPHILS 05/09/2018 0.1  0.0 - 0.4 K/UL Final    ABS. BASOPHILS 05/09/2018 0.0  0.0 - 0.06 K/UL Final    DF 05/09/2018 AUTOMATED    Final    Interleukin-6 (IL-6) 05/09/2018 1.7  0.0 - 15.5 pg/mL Final    Comment: (NOTE)  Results for this test are for research purposes only by the assay's  . The performance characteristics of this product have  not been established. Results should not be used as a diagnostic  procedure without confirmation of the diagnosis by another  medically established diagnostic product or procedure.   Performed At: [address] [id number]  Mica Escalante MD TV:[id number]      Sed rate, automated 05/09/2018 14  0 - 20 mm/hr Final Office Visit on 04/09/2018   Component Date Value Ref Range Status    VALID INTERNAL CONTROL POC 04/09/2018 Yes   Final    INR POC 04/09/2018    Final    Dr Frank Collins resulted INR not recorded   Hospital Outpatient Visit on 04/04/2018   Component Date Value Ref Range Status    C-Reactive protein 04/04/2018 6.1* 0 - 0.3 mg/dL Final    WBC 04/04/2018 6.4  4.6 - 13.2 K/uL Final    RBC 04/04/2018 3.42* 4.70 - 5.50 M/uL Final    HGB 04/04/2018 10.8* 13.0 - 16.0 g/dL Final    HCT 04/04/2018 33.2* 36.0 - 48.0 % Final    MCV 04/04/2018 97.1* 74.0 - 97.0 FL Final    MCH 04/04/2018 31.6  24.0 - 34.0 PG Final    MCHC 04/04/2018 32.5  31.0 - 37.0 g/dL Final    RDW 04/04/2018 12.3  11.6 - 14.5 % Final    PLATELET 57/42/6849 833  135 - 420 K/uL Final    MPV 04/04/2018 9.6  9.2 - 11.8 FL Final    NEUTROPHILS 04/04/2018 66  40 - 73 % Final    LYMPHOCYTES 04/04/2018 15* 21 - 52 % Final    MONOCYTES 04/04/2018 12* 3 - 10 % Final    EOSINOPHILS 04/04/2018 6* 0 - 5 % Final    BASOPHILS 04/04/2018 1  0 - 2 % Final    ABS. NEUTROPHILS 04/04/2018 4.3  1.8 - 8.0 K/UL Final    ABS. LYMPHOCYTES 04/04/2018 0.9  0.9 - 3.6 K/UL Final    ABS. MONOCYTES 04/04/2018 0.8  0.05 - 1.2 K/UL Final    ABS. EOSINOPHILS 04/04/2018 0.4  0.0 - 0.4 K/UL Final    ABS.  BASOPHILS 04/04/2018 0.0  0.0 - 0.06 K/UL Final    DF 04/04/2018 AUTOMATED    Final    Sodium 04/04/2018 140  136 - 145 mmol/L Final    Potassium 04/04/2018 4.0  3.5 - 5.5 mmol/L Final    Chloride 04/04/2018 103  100 - 108 mmol/L Final    CO2 04/04/2018 30  21 - 32 mmol/L Final    Anion gap 04/04/2018 7  3.0 - 18 mmol/L Final    Glucose 04/04/2018 127* 74 - 99 mg/dL Final    BUN 04/04/2018 14  7.0 - 18 MG/DL Final    Creatinine 04/04/2018 0.87  0.6 - 1.3 MG/DL Final    BUN/Creatinine ratio 04/04/2018 16  12 - 20   Final    GFR est AA 04/04/2018 >60  >60 ml/min/1.73m2 Final    GFR est non-AA 04/04/2018 >60  >60 ml/min/1.73m2 Final    Comment: (NOTE)  Estimated GFR is calculated using the Modification of Diet in Renal   Disease (MDRD) Study equation, reported for both  Americans   (GFRAA) and non- Americans (GFRNA), and normalized to 1.73m2   body surface area. The physician must decide which value applies to   the patient. The MDRD study equation should only be used in   individuals age 25 or older. It has not been validated for the   following: pregnant women, patients with serious comorbid conditions,   or on certain medications, or persons with extremes of body size,   muscle mass, or nutritional status.  Calcium 04/04/2018 9.2  8.5 - 10.1 MG/DL Final    Bilirubin, total 04/04/2018 0.3  0.2 - 1.0 MG/DL Final    ALT (SGPT) 04/04/2018 30  16 - 61 U/L Final    AST (SGOT) 04/04/2018 21  15 - 37 U/L Final    Alk. phosphatase 04/04/2018 52  45 - 117 U/L Final    Protein, total 04/04/2018 6.2* 6.4 - 8.2 g/dL Final    Albumin 04/04/2018 2.9* 3.4 - 5.0 g/dL Final    Globulin 04/04/2018 3.3  2.0 - 4.0 g/dL Final    A-G Ratio 04/04/2018 0.9  0.8 - 1.7   Final   Admission on 03/27/2018, Discharged on 03/28/2018   Component Date Value Ref Range Status    INR (POC) 03/27/2018 1.0  <1.2   Final    The intended use of the i-STAT PT/INR is for monitoring oral anticoagulant therapy and not for evaluating individual factor deficiencies.  WBC 03/28/2018 9.6  4.6 - 13.2 K/uL Final    RBC 03/28/2018 3.73* 4.70 - 5.50 M/uL Final    HGB 03/28/2018 11.7* 13.0 - 16.0 g/dL Final    This is a post-surgery specimen.     HCT 03/28/2018 35.6* 36.0 - 48.0 % Final    MCV 03/28/2018 95.4  74.0 - 97.0 FL Final    MCH 03/28/2018 31.4  24.0 - 34.0 PG Final    MCHC 03/28/2018 32.9  31.0 - 37.0 g/dL Final    RDW 03/28/2018 13.0  11.6 - 14.5 % Final    PLATELET 74/04/0618 810  135 - 420 K/uL Final    MPV 03/28/2018 10.7  9.2 - 11.8 FL Final    NEUTROPHILS 03/28/2018 73  40 - 73 % Final    LYMPHOCYTES 03/28/2018 14* 21 - 52 % Final    MONOCYTES 03/28/2018 13* 3 - 10 % Final    EOSINOPHILS 03/28/2018 0  0 - 5 % Final    BASOPHILS 03/28/2018 0  0 - 2 % Final    ABS. NEUTROPHILS 03/28/2018 7.0  1.8 - 8.0 K/UL Final    ABS. LYMPHOCYTES 03/28/2018 1.3  0.9 - 3.6 K/UL Final    ABS. MONOCYTES 03/28/2018 1.2  0.05 - 1.2 K/UL Final    ABS. EOSINOPHILS 03/28/2018 0.0  0.0 - 0.4 K/UL Final    ABS. BASOPHILS 03/28/2018 0.0  0.0 - 0.1 K/UL Final    DF 03/28/2018 AUTOMATED    Final    Prothrombin time 03/28/2018 14.9  11.5 - 15.2 sec Final    INR 03/28/2018 1.2  0.8 - 1.2   Final    Comment:            INR Therapeutic Ranges         (on stable oral anticoagulant):     INDICATION                INR  DVT/PE/Atrial Fib          2.0-3.0  MI/Mechanical Heart Valve  2.5-3.5         . No results found for any visits on 06/25/18. Assessment / Plan      ICD-10-CM ICD-9-CM    1. Lymphadenopathy, inguinal R59.0 785.6 CT PELV W CONT   2. Yeast infection B37.9 112.9        CT scan pelvis  Lotrisone  he was advised to continue his maintenance medications      Follow-up Disposition:  Return in about 6 months (around 12/25/2018). I asked Bartolo Stevenson if he has any questions and I answered the questions.   Bartolo Stevenson states that he understands the treatment plan and agrees with the treatment plan

## 2018-06-27 NOTE — PROGRESS NOTES
PT DAILY TREATMENT NOTE - [facility]     Patient Name: Yonatan Lorenzo  Date:2018  : 1955  [x]  Patient  Verified  Payor: Suman Sullivan / Plan: [place] / Product Type: Medicare /    In time:11:30  Out time:12:34  Total Treatment Time (min): 64  Total Timed Codes (min): 54  1:1 Treatment Time ( [address] only): 30  Visit #: 2 of 10    Treatment Area: Pain in left knee [M25.562]  Other acute postprocedural pain [G89.18]    SUBJECTIVE  Pain Level (0-10 scale): /10  Any medication changes, allergies to medications, adverse drug reactions, diagnosis change, or new procedure performed?: [x] No    [] Yes (see summary sheet for update)  Subjective functional status/changes:   [] No changes reported  Pt reports half of the time it is getting easier to get in the car now and cross his legs after last session. He is working with the tennis ball on knots on the side of his thigh and lower leg. He still has difficulty going down the stairs when the right goes down and the left stays on the stair.     OBJECTIVE    Modality rationale: decrease inflammation and decrease pain to improve the patients ability to improve ease of stairs and car transfers   Min Type Additional Details    [] Estim:  []Unatt       []IFC  []Premod                        []Other:  []w/ice   []w/heat  Position:  Location:    [] Estim: []Att    []TENS instruct  []NMES                    []Other:  []w/US   []w/ice   []w/heat  Position:  Location:    []  Traction: [] Cervical       []Lumbar                       [] Prone          []Supine                       []Intermittent   []Continuous Lbs:  [] before manual  [] after manual    []  Ultrasound: []Continuous   [] Pulsed                           []1MHz   []3MHz W/cm2:  Location:    []  Iontophoresis with dexamethasone         Location: [] Take home patch   [] In clinic   10 [x]  Ice     []  heat  []  Ice massage  []  Laser   []  Anodyne Position:supine HOB elevated  Location: left knee []  Laser with stim  []  Other:  Position:  Location:    []  Vasopneumatic Device Pressure:       [] lo [] med [] hi   Temperature: [] lo [] med [] hi   [x] Skin assessment post-treatment:  [x]intact []redness- no adverse reaction    []redness - adverse reaction:     34 min Therapeutic Exercise:  [x] See flow sheet :   Rationale: increase ROM and increase strength to improve the patients ability to improve ease of stairs and car transfers    20 min Therapeutic Activity:  [x]  See flow sheet : stair negotiation; simulated car transfers sitting on end of plinth and lifting left leg onto 8\" step; sit to stands; step ups to 6\" and 8\" with and without SPC out of //   Rationale: increase ROM, increase strength, improve coordination, improve balance and increase proprioception  to improve the patients ability to improve ease of stairs and car transfers           With   [] TE   [] TA   [] neuro   [] other: Patient Education: [x] Review HEP    [] Progressed/Changed HEP based on:   [] positioning   [] body mechanics   [] transfers   [] heat/ice application    [] other:      Other Objective/Functional Measures:   Challenged with simulated car transfers combination of hip flexion with adduction; added 7# ankle weight for strengthening  Decreased eccentric quad control for stair descent  Able to negotiate stairs reciprocally with 1 HR after initial cueing  Pt used stick massager for tibialis anterior, evertors and ITB for 1' each prior to working on steps     Pain Level (0-10 scale) post treatment: 1-2/10    ASSESSMENT/Changes in Function: Pt making steady progress towards goals with decreasing pain/swelling and improving strength. He has most difficulty with stair descent due to decreased eccentric quad control and with car transfers from weakness. Will continue to work on focused strengthening for functional specific activities to return to work without increased pain/difficulty.      Patient will continue to benefit from skilled PT services to modify and progress therapeutic interventions, address functional mobility deficits, address ROM deficits, address strength deficits, analyze and address soft tissue restrictions, analyze and cue movement patterns, analyze and modify body mechanics/ergonomics, assess and modify postural abnormalities, address imbalance/dizziness and instruct in home and community integration to attain remaining goals. Progress towards goals / Updated goals:  1. Pt will improve FOTO by 16 points in order to demonstrate functional improvement. Not met 2 point improvement due to decline since last assessed (18)  2. Pt will improve left knee flexion AROM to 120 dgrs in order to improve gait pattern and ease of work tasks. Progressin degrees AROM, 113 degrees PROM (18)  3. Pt will improve left knee MMT to 4+/5 in order to improve ease of work tasks. Progressin/5 (18)  4. Pt will ascend and descend 4 steps with reciprocal pattern to increase efficiency with stair negotiation.  Met (18)    PLAN  [x]  Upgrade activities as tolerated     [x]  Continue plan of care  []  Update interventions per flow sheet       []  Discharge due to:_  []  Other:_      Cora Horvath PTA 2018  11:04 AM    Future Appointments  Date Time Provider Dex Alysia   2018 11:30 AM Cora Horvath PTA [facility]   2018 10:00 AM Parveen Clemons PA-C [facility]   2018 11:30 AM Cora Horvath PTA [facility]   2018 6:30 PM [facility] CT RM 1 [facility]   7/3/2018 2:00 PM Shannon Albert MD [address] [place] [address]   2018 10:30 AM [facility] [address] [facility]   2018 10:30 AM [facility] PT [address] [facility]   2018 10:30 AM [facility] PT [address] [facility]   2018 3:30 PM Eloisa Darby  [address]    2018 4:00 PM Jose Armando Darden  [address] [place]

## 2018-06-28 ENCOUNTER — OFFICE VISIT (OUTPATIENT)
Dept: ORTHOPEDIC SURGERY | Facility: CLINIC | Age: 63
End: 2018-06-28

## 2018-06-28 ENCOUNTER — APPOINTMENT (OUTPATIENT)
Dept: PHYSICAL THERAPY | Age: 63
End: 2018-06-28
Payer: MEDICARE

## 2018-06-28 VITALS
DIASTOLIC BLOOD PRESSURE: 71 MMHG | BODY MASS INDEX: 31.81 KG/M2 | OXYGEN SATURATION: 93 % | SYSTOLIC BLOOD PRESSURE: 133 MMHG | HEIGHT: 70 IN | TEMPERATURE: 97.3 F | WEIGHT: 222.2 LBS | HEART RATE: 85 BPM

## 2018-06-28 DIAGNOSIS — M76.32 ILIOTIBIAL BAND TENDINITIS OF LEFT SIDE: Primary | ICD-10-CM

## 2018-06-28 RX ORDER — DICLOFENAC SODIUM 10 MG/G
4 GEL TOPICAL 4 TIMES DAILY
Qty: 5 EACH | Refills: 0 | Status: SHIPPED | OUTPATIENT
Start: 2018-06-28 | End: 2018-10-11 | Stop reason: SDUPTHER

## 2018-06-28 NOTE — LETTER
NOTIFICATION RETURN TO WORK / SCHOOL 
 
6/28/2018 10:50 AM 
 
Mr. Brenda Moura [id number] [facility] [id number] To Whom It May Concern: 
 
Brenda Moura is currently under the care of [address] [place] [address]. Please allow patient to remain out of work for another 3-4 weeks. If there are questions or concerns please have the patient contact our office.  
 
 
 
Sincerely, 
 
 
Dave Frankel, PA-C

## 2018-06-28 NOTE — PROGRESS NOTES
[address] [facility], [address] [facility]  [phone]           Patient: Cristal Serrano                MRN: [id number]       SSN: [id number]  YOB: 1955        AGE: 70 y.o. SEX: male  Body mass index is 31.88 kg/(m^2). PCP: Janel Corley MD  06/28/18      This office note has been dictated. REVIEW OF SYSTEMS:  Constitutional: Negative for fever, chills, weight loss and malaise/fatigue. HENT: Negative. Eyes: Negative. Respiratory: Negative. Cardiovascular: Negative. Gastrointestinal: No bowel incontinence or constipation. Genitourinary: No bladder incontinence or saddle anesthesia. Skin: Negative. Neurological: Negative. Endo/Heme/Allergies: Negative. Psychiatric/Behavioral: Negative. Musculoskeletal: As per HPI above. Past Medical History:   Diagnosis Date    Arthritis     Bleeding     Blood clot in the legs     Chronic pain     knee and shoulder    Esophageal reflux     GERD (gastroesophageal reflux disease)     Headache(784.0)     migraine    High cholesterol     Hypertension     Lower back pain 11/6/2010    Other chest pain     Personal history of venous thrombosis and embolism     Pure hypercholesterolemia     Right buttock pain 11/6/2010    Right foot pain     Rotator cuff tear     left-since 2010, worsened tear [place].    Spinal stenosis     Tendonitis, tibialis     anterior    Thromboembolus ([facility] [address]     3 after sx last one 2000         Current Outpatient Prescriptions:     diclofenac (VOLTAREN) 1 % gel, Apply 4 g to affected area four (4) times daily. Maximum 16 grams per joint per day.  Dispense 5 100 gram tubes, Disp: 5 Each, Rfl: 0    clotrimazole-betamethasone (LOTRISONE) topical cream, Apply twice per day, Disp: 45 g, Rfl: 1    fluconazole (DIFLUCAN) 100 mg tablet, 1 tab daily, Disp: 5 Tab, Rfl: 0    butalbital-acetaminophen-caff (FIORICET) -40 mg per capsule, Take 2 capsules every 8 hours as needed for headache, Disp: 90 Cap, Rfl: 3    lisinopril-hydroCHLOROthiazide (PRINZIDE, ZESTORETIC) 20-12.5 mg per tablet, TAKE 1 TABLET BY MOUTH DAILY, Disp: 90 Tab, Rfl: 0    labetalol (NORMODYNE) 100 mg tablet, TAKE ONE TABLET BY MOUTH TWICE DAILY (Patient taking differently: TAKE ONE TABLET BY MOUTH DAILY), Disp: 180 Tab, Rfl: 0    metaxalone (SKELAXIN) 800 mg tablet, Take 1 Tab by mouth three (3) times daily. Indications: Muscle Spasm (Patient taking differently: Take 800 mg by mouth three (3) times daily as needed. Indications: Muscle Spasm), Disp: 90 Tab, Rfl: 2    montelukast (SINGULAIR) 10 mg tablet, TAKE 1 TABLET BY MOUTH EVERY DAY (Patient taking differently: TAKE 1 TABLET BY MOUTH EVERY HS), Disp: 90 Tab, Rfl: 0    lansoprazole (PREVACID) 30 mg capsule, TAKE 1 CAPSULE DAILY BEFOREBREAKFAST, Disp: 90 Cap, Rfl: 3    nystatin (MYCOSTATIN) topical cream, APPLY EXTERNALLY TO THE AFFECTED AREA TWICE DAILY, Disp: 15 g, Rfl: 0    warfarin (COUMADIN) 5 mg tablet, TAKE 2 AND 1/2 TABLETS BY MOUTH DAILY OR AS DIRECTED, Disp: 75 Tab, Rfl: 0    warfarin (COUMADIN) 3 mg tablet, Or as directed, Disp: 30 Tab, Rfl: 3    HYDROcodone-acetaminophen (NORCO)  mg tablet, Take 1 Tab by mouth every eight (8) hours as needed for Pain. Max Daily Amount: 3 Tabs., Disp: 40 Tab, Rfl: 0    raNITIdine (ZANTAC) 150 mg tablet, Take 150 mg by mouth nightly., Disp: , Rfl: 5    tamsulosin (FLOMAX) 0.4 mg capsule, Take 1 Cap by mouth daily. , Disp: 30 Cap, Rfl: 2    azelastine (ASTELIN) 137 mcg (0.1 %) nasal spray, 1 spray up each nostril twice per day (Patient taking differently: 1 Spray by Both Nostrils route daily.  Using once per day), Disp: 1 Bottle, Rfl: 1    acetaminophen (TYLENOL) 500 mg tablet, Take 1,000 mg by mouth every six (6) hours as needed for Pain., Disp: , Rfl:     Allergies   Allergen Reactions    Augmentin [Amoxicillin-Pot Clavulanate] Itching    Hibiclens [Chlorhexidine Gluconate] Itching    Penicillins Rash    Requip [Ropinirole] Nausea and Vomiting       Social History     Social History    Marital status:      Spouse name: N/A    Number of children: N/A    Years of education: N/A     Occupational History    Not on file. Social History Main Topics    Smoking status: Never Smoker    Smokeless tobacco: Never Used    Alcohol use No    Drug use: No    Sexual activity: Not Currently     Other Topics Concern    Not on file     Social History Narrative       Past Surgical History:   Procedure Laterality Date    FOOT/TOES SURGERY PROC UNLISTED      HX BACK SURGERY      HX ORTHOPAEDIC  06-25-12    Right foot with excision of bursa and adipose tissue from fifth metatarsal base by Dr. Isac Goldberg      lower back (1992 and 2000)    HX OTHER SURGICAL      left foot (2008)    LAMINOTOMY      NERVE BLOCK                 SUBJECTIVE:   We did see Mr. Tanvi Klein in the office today for followup in regard to his left knee replacement. Patient is now over 3 months status post surgery a nd he is improving. He has been working physical therapy. Pain is slowing down. Range of motion is improving. He does still have a little lateral-based discomfort of his knee. He does continue on Celebrex. He states he is going through some testing currently for an enlarged lymph node as well as an elevated PSA. PHYSICAL EXAMINATION: In general, the patient is alert and oriented x3 and is in no acute distress. The patient is well-developed and well-nourished with a normal affect. The patient is afebrile. HEENT:  Head is normocephalic and atraumatic. Extraocular eye movements are intact. No JVD is present. Breathing is nonlabored. Examination of lower extremities reveals pain-free range of motion of the hips. There is no pain over the greater trochanteric bursa. Negative straight leg raise. No calf tenderness. No Homans. No evidence of DVT present. Left knee reveals skin intact. Surgical wound is healing nicely. No erythema, no ecchymosis, and no warmth. No signs for infection or cellulitis present. Full range of motion, very good stability, and the patella tracks nicely without rubs or crepitus. The patient does have some tenderness to palpation to the IT band distally. LABORATORY DATA:  CBC: White count 5.5. CRP 0.6. IL-6 of 63.0. Sedimentation rate 6. Uric acid 6.4    ASSESSMENT:    1. Status post left knee replacement  2. Iliotibial (IT) band  tendinitis, left knee. PLAN:  At this point, he will continue on Celebrex. I am going to order Voltaren Gel to apply to the area about 4 times a day. He will continue activities as tolerated. He is doing well. He is not quite ready for return to work yet. We will plan on thing him back in about 3 weeks' time for evaluation. A note was provided today to keep him out of work 3-4 weeks. He will call with any questions or if concerns arise.     MD JR Willie Alvarez MPAS, PA-C, ATC

## 2018-06-29 ENCOUNTER — HOSPITAL ENCOUNTER (OUTPATIENT)
Dept: CT IMAGING | Age: 63
Discharge: HOME | End: 2018-06-29
Attending: INTERNAL MEDICINE
Payer: MEDICARE

## 2018-06-29 DIAGNOSIS — R59.0 LYMPHADENOPATHY, INGUINAL: ICD-10-CM

## 2018-06-29 LAB — CREAT UR-MCNC: 1 MG/DL (ref 0.6–1.3)

## 2018-06-29 PROCEDURE — 74011636320 HC RX REV CODE- 636/320: Performed by: INTERNAL MEDICINE

## 2018-06-29 PROCEDURE — 82565 ASSAY OF CREATININE: CPT

## 2018-06-29 PROCEDURE — 72193 CT PELVIS W/DYE: CPT

## 2018-06-29 RX ADMIN — IOPAMIDOL 100 ML: 612 INJECTION, SOLUTION INTRAVENOUS at 19:00

## 2018-07-02 ENCOUNTER — TELEPHONE (OUTPATIENT)
Dept: INTERNAL MEDICINE CLINIC | Age: 63
End: 2018-07-02

## 2018-07-02 RX ORDER — LISINOPRIL AND HYDROCHLOROTHIAZIDE 12.5; 2 MG/1; MG/1
TABLET ORAL
Qty: 90 TAB | Refills: 0 | Status: SHIPPED | OUTPATIENT
Start: 2018-07-02 | End: 2018-10-18 | Stop reason: SDUPTHER

## 2018-07-02 NOTE — TELEPHONE ENCOUNTER
Patients wife called and would like results of CT scan. He has an appointment tomorrow at 2 with another doctor and would like to have those results before he goes.   Please advise by tomorrow at noon [phone]

## 2018-07-03 ENCOUNTER — OFFICE VISIT (OUTPATIENT)
Dept: UROLOGY | Age: 63
End: 2018-07-03

## 2018-07-03 VITALS
DIASTOLIC BLOOD PRESSURE: 76 MMHG | BODY MASS INDEX: 31.78 KG/M2 | HEART RATE: 87 BPM | OXYGEN SATURATION: 95 % | WEIGHT: 222 LBS | HEIGHT: 70 IN | SYSTOLIC BLOOD PRESSURE: 135 MMHG

## 2018-07-03 DIAGNOSIS — Z80.42 FAMILY HISTORY OF PROSTATE CANCER: ICD-10-CM

## 2018-07-03 DIAGNOSIS — R97.20 ELEVATED PSA: Primary | ICD-10-CM

## 2018-07-03 LAB
BILIRUB UR QL STRIP: NEGATIVE
GLUCOSE UR-MCNC: NEGATIVE MG/DL
KETONES P FAST UR STRIP-MCNC: NEGATIVE MG/DL
PH UR STRIP: 5.5 [PH] (ref 4.6–8)
PROT UR QL STRIP: NEGATIVE
SP GR UR STRIP: 1.03 (ref 1–1.03)
UA UROBILINOGEN AMB POC: NORMAL (ref 0.2–1)
URINALYSIS CLARITY POC: CLEAR
URINALYSIS COLOR POC: YELLOW
URINE BLOOD POC: NEGATIVE
URINE LEUKOCYTES POC: NEGATIVE
URINE NITRITES POC: NEGATIVE

## 2018-07-03 RX ORDER — CELECOXIB 200 MG/1
CAPSULE ORAL 2 TIMES DAILY
COMMUNITY
End: 2018-07-26 | Stop reason: SDUPTHER

## 2018-07-03 NOTE — PROGRESS NOTES
Mr. Christian Wilcox has a reminder for a \"due or due soon\" health maintenance. I have asked that he contact his primary care provider for follow-up on this health maintenance.

## 2018-07-03 NOTE — PATIENT INSTRUCTIONS
Prostate Biopsy and Ultrasound: About This Test  What is it? A prostate biopsy is a type of test. Your doctor takes small tissue samples from your prostate gland. Then another doctor looks at the tissue under a microscope to see if there are cancer cells. This test is done by a doctor who specializes in men's genital and urinary problems (urologist). It can be done in your doctor's office, a day surgery clinic, or a hospital operating room. To get the tissue samples from the prostate, the doctor inserts a thin needle through the rectum, the urethra, or the area between the anus and scrotum (perineum). The most common method is through the rectum. Your doctor may use ultrasound to help guide the needle. Why is this test done? You may need a prostate biopsy if your doctor found something of concern during a digital rectal exam. Or you may need it if a blood test showed a high level of prostate-specific antigen (PSA). A biopsy can help find out if you have prostate cancer. How can you prepare for this test?  Before you have a prostate biopsy, tell your doctor if:  · You are taking any medicines or using any herbal supplements. · You are allergic to any medicines. · You have had bleeding problems, or you take aspirin or some other blood thinner. What happens before the test?  · You may need to have an enema before the test.  · You will need to take off all or most of your clothes. You will be given a cloth or paper gown to use during the test.  · You may be given a sedative through a vein (IV) in your arm. The sedative will help you relax and stay still. What happens during the test?  Through the rectum  · You may be asked to kneel, lie on your side, or lie on your back. · Your doctor may inject an anesthetic around the prostate to numb the area before the sample is taken. · Ultrasound is often used to guide the needle to the correct spot. · Your doctor may choose to use a needle guide for the biopsy. He or she will attach the guide to a finger. Your doctor will insert the finger into your rectum. · The needle will enter the prostate and take 6 to 12 samples. Through the urethra  · You will lie on your back. Your feet will rest in stirrups. · You will get anesthesia. The anesthesia may make you sleep. Or it may just numb the area being worked on.  · Your doctor will insert a lighted scope (cystoscope) into your urethra. The scope allows your doctor to look directly at the prostate. Your doctor will pass a cutting loop through the scope to remove samples of prostate tissue. Through the perineum  · You will lie on an exam table either on your side or on your back with your knees bent. You will get anesthesia. The anesthesia may make you sleep. Or it may just numb the area being worked on.  · Your doctor will make a small cut in your perineum. Then he or she will insert a finger into the rectum to hold the prostate. · Your doctor will then insert the needle through the cut and into the prostate. · The needle collects samples of tissue and is then pulled out. What else should you know about this test?  · A prostate biopsy has a slight risk of causing problems such as infection or bleeding. · If the biopsy went through your rectum, you may have a small amount of bleeding from your rectum for 2 to 3 days after the biopsy. · You may have a little pain in your pelvic area. You may also have a little blood in your urine for 1 to 5 days. · You may have some blood in your semen for a week or longer. How long will the test take? · This test will take about 15 to 45 minutes. What happens after the test?  Your doctor will tell you what to expect and watch for after your test. In general:  · If you have anesthesia that makes you sleep, you will be in a recovery room for a few hours. You will need someone to drive you home. You may feel tired for the rest of the day.   · You will probably be able to go home right away.  · If your doctor had you stop taking any medicine for the biopsy, ask him or her when you can start taking it again. If you were given antibiotics, take them as directed. · Do not do heavy work or exercise for 4 hours after the test.  · Your doctor will tell you how long it may take to get your results back. Follow-up care is a key part of your treatment and safety. Be sure to make and go to all appointments, and call your doctor if you are having problems. It's also a good idea to keep a list of the medicines you take. Ask your doctor when you can expect to have your test results. Where can you learn more? Go to http://rivka-jarrell.info/. Enter Y504 in the search box to learn more about \"Prostate Biopsy and Ultrasound: About This Test.\"  Current as of: May 12, 2017  Content Version: 11.4  © 7859-6203 Healthwise, Incorporated. Care instructions adapted under license by [employer] (which disclaims liability or warranty for this information). If you have questions about a medical condition or this instruction, always ask your healthcare professional. [address] any warranty or liability for your use of this information.

## 2018-07-03 NOTE — MR AVS SNAPSHOT
[address] [facility] [place] [address] [address] 
[phone] Patient: Elieser Storm MRN: [id number] Visit Information Date & Time Provider Department Dept. Phone Encounter #  
 7/3/2018  2:00 PM Vira Lundberg, [facility] [phone] [id number] Your Appointments 7/19/2018  9:45 AM  
POST OP with Marylen Counter, PA-C  
[facility] [address]) Appt Note: 3 wk f/u  
 [address], [address] [facility] [address]  
[phone]  
  
   
 [address], [address] [facility] [address] 8/15/2018  3:00 PM  
BIOPSY with Vira Lundberg MD  
[facility] [address] [place] [address]) Appt Note: Trus/BX/Will get clearance from Dr. Della Frye [address] [place] [address] [address]  
[phone] [phone]  
  
    
 8/20/2018  3:30 PM  
Follow Up with Christiano Dorman MD  
[facility] [address] [place] [address]) Appt Note: 3 MO F/U  
 [address] [facility] [address]  
[address] [facility] [address] [facility] [facility] [address]  
  
    
 8/22/2018  3:30 PM  
Office Visit with Vira Lundberg MD  
[facility] [address] [place] [address]) Appt Note: Bx results [address] [place] [address] [address]  
[phone] [phone]  
  
    
 8/30/2018  4:00 PM  
Follow Up with Nancy Shepherd MD  
[facility] [place] [address]) Appt Note: 3 mo f/u  
 [address], [address]  
  
   
 [address], [address] [facility] [address] Upcoming Health Maintenance Date Due Hepatitis C Screening 1955 ZOSTER VACCINE AGE 60> 2/13/2013 GLAUCOMA SCREENING Q2Y 4/13/2018 Pneumococcal 65+ Low/Medium Risk (1 of 2 - PCV13) 4/13/2018 MEDICARE YEARLY EXAM 5/2/2018 Influenza Age 5 to Adult 8/1/2018 DTaP/Tdap/Td series (2 - Td) 12/6/2024 COLONOSCOPY 5/27/2026 Allergies as of 7/3/2018  Review Complete On: 7/3/2018 By: Makayla Milton LPN Severity Noted Reaction Type Reactions Amoxicillin  07/03/2018    Itching Augmentin [Amoxicillin-pot Clavulanate]    Itching Hibiclens [Chlorhexidine Gluconate]  03/16/2018    Itching Penicillins    Rash Requip [Ropinirole]  05/30/2018    Nausea and Vomiting Current Immunizations  Reviewed on 6/22/2018 Name Date Tdap 12/6/2014 Not reviewed this visit You Were Diagnosed With   
  
 Codes Comments Elevated PSA    -  Primary ICD-10-CM: R97.20 ICD-9-CM: 790.93 Family history of prostate cancer     ICD-10-CM: Z80.42 
ICD-9-CM: V16.42 Vitals BP Pulse Height(growth percentile) Weight(growth percentile) SpO2 BMI  
 135/76 (BP 1 Location: Right arm, BP Patient Position: Sitting) 87 5' 10\" (1.778 m) 222 lb (100.7 kg) 95% 31.85 kg/m2 Smoking Status Never Smoker Vitals History BMI and BSA Data Body Mass Index Body Surface Area  
 31.85 kg/m 2 2.23 m 2 Preferred Pharmacy Pharmacy Name Phone [facility] [address] [place] [address] [address] [phone] Your Updated Medication List  
  
   
This list is accurate as of 7/3/18  4:18 PM.  Always use your most recent med list.  
  
  
  
  
 acetaminophen 500 mg tablet Commonly known as:  TYLENOL Take 1,000 mg by mouth every six (6) hours as needed for Pain. azelastine 137 mcg (0.1 %) nasal spray Commonly known as:  ASTELIN  
1 spray up each nostril twice per day  
  
 butalbital-acetaminophen-caff -40 mg per capsule Commonly known as:  [employer] Take 2 capsules every 8 hours as needed for headache celecoxib 200 mg capsule Commonly known as:  CELEBREX Take  by mouth two (2) times a day. clotrimazole-betamethasone topical cream  
Commonly known as:  Maravilla Concepcion Apply twice per day  
  
 diclofenac 1 % Gel Commonly known as:  VOLTAREN Apply 4 g to affected area four (4) times daily. Maximum 16 grams per joint per day. Dispense 5 100 gram tubes HYDROcodone-acetaminophen  mg tablet Commonly known as:  Lyssa President Take 1 Tab by mouth every eight (8) hours as needed for Pain. Max Daily Amount: 3 Tabs. labetalol 100 mg tablet Commonly known as:  NORMODYNE  
TAKE ONE TABLET BY MOUTH TWICE DAILY  
  
 lansoprazole 30 mg capsule Commonly known as:  PREVACID TAKE 1 CAPSULE DAILY BEFOREBREAKFAST  
  
 lisinopril-hydroCHLOROthiazide 20-12.5 mg per tablet Commonly known as:  PRINZIDE, ZESTORETIC  
TAKE 1 TABLET BY MOUTH DAILY  
  
 metaxalone 800 mg tablet Commonly known as:  SKELAXIN Take 1 Tab by mouth three (3) times daily. Indications: Muscle Spasm  
  
 montelukast 10 mg tablet Commonly known as:  SINGULAIR  
TAKE 1 TABLET BY MOUTH EVERY DAY  
  
 nystatin topical cream  
Commonly known as:  MYCOSTATIN  
APPLY EXTERNALLY TO THE AFFECTED AREA TWICE DAILY  
  
 raNITIdine 150 mg tablet Commonly known as:  ZANTAC Take 150 mg by mouth nightly. tamsulosin 0.4 mg capsule Commonly known as:  FLOMAX Take 1 Cap by mouth daily. * warfarin 3 mg tablet Commonly known as:  COUMADIN Or as directed * warfarin 5 mg tablet Commonly known as:  COUMADIN  
TAKE 2 AND 1/2 TABLETS BY MOUTH DAILY OR AS DIRECTED * Notice: This list has 2 medication(s) that are the same as other medications prescribed for you. Read the directions carefully, and ask your doctor or other care provider to review them with you. We Performed the Following AMB POC URINALYSIS DIP STICK AUTO W/O MICRO [40893 CPT(R)] To-Do List   
 07/06/2018 10:30 AM  
 Appointment with [facility] [address] at [facility] PT [address] [facility] ([phone])  
  
 07/12/2018 10:30 AM  
  Appointment with [facility] [address] at [facility] PT [address] [facility] ([phone])  
  
 07/19/2018 10:30 AM  
  Appoint[facility] [address] [facility] PT [address] [facility] ([phone]) Patient Instructions Prostate Biopsy and Ultrasound: About This Test 
What is it? A prostate biopsy is a type of test. Your doctor takes small tissue samples from your prostate gland. Then another doctor looks at the tissue under a microscope to see if there are cancer cells. This test is done by a doctor who specializes in men's genital and urinary problems (urologist). It can be done in your doctor's office, a day surgery clinic, or a hospital operating room. To get the tissue samples from the prostate, the doctor inserts a thin needle through the rectum, the urethra, or the area between the anus and scrotum (perineum). The most common method is through the rectum. Your doctor may use ultrasound to help guide the needle. Why is this test done? You may need a prostate biopsy if your doctor found something of concern during a digital rectal exam. Or you may need it if a blood test showed a high level of prostate-specific antigen (PSA). A biopsy can help find out if you have prostate cancer. How can you prepare for this test? 
Before you have a prostate biopsy, tell your doctor if: 
· You are taking any medicines or using any herbal supplements. · You are allergic to any medicines. · You have had bleeding problems, or you take aspirin or some other blood thinner. What happens before the test? 
· You may need to have an enema before the test. 
· You will need to take off all or most of your clothes. You will be given a cloth or paper gown to use during the test. 
· You may be given a sedative through a vein (IV) in your arm. The sedative will help you relax and stay still. What happens during the test? 
Through the rectum · You may be asked to kneel, lie on your side, or lie on your back. · Your doctor may inject an anesthetic around the prostate to numb the area before the sample is taken. · Ultrasound is often used to guide the needle to the correct spot. · Your doctor may choose to use a needle guide for the biopsy. He or she will attach the guide to a finger. Your doctor will insert the finger into your rectum. · The needle will enter the prostate and take 6 to 12 samples. Through the urethra · You will lie on your back. Your feet will rest in stirrups. · You will get anesthesia. The anesthesia may make you sleep. Or it may just numb the area being worked on. 
· Your doctor will insert a lighted scope (cystoscope) into your urethra. The scope allows your doctor to look directly at the prostate. Your doctor will pass a cutting loop through the scope to remove samples of prostate tissue. Through the perineum · You will lie on an exam table either on your side or on your back with your knees bent. You will get anesthesia. The anesthesia may make you sleep. Or it may just numb the area being worked on. 
· Your doctor will make a small cut in your perineum. Then he or she will insert a finger into the rectum to hold the prostate. · Your doctor will then insert the needle through the cut and into the prostate. · The needle collects samples of tissue and is then pulled out. What else should you know about this test? 
· A prostate biopsy has a slight risk of causing problems such as infection or bleeding. · If the biopsy went through your rectum, you may have a small amount of bleeding from your rectum for 2 to 3 days after the biopsy. · You may have a little pain in your pelvic area. You may also have a little blood in your urine for 1 to 5 days. · You may have some blood in your semen for a week or longer. How long will the test take? · This test will take about 15 to 45 minutes. What happens after the test? 
Your doctor will tell you what to expect and watch for after your test. In general: · If you have anesthesia that makes you sleep, you will be in a recovery room for a few hours. You will need someone to drive you home. You may feel tired for the rest of the day. · You will probably be able to go home right away. · If your doctor had you stop taking any medicine for the biopsy, ask him or her when you can start taking it again. If you were given antibiotics, take them as directed. · Do not do heavy work or exercise for 4 hours after the test. 
· Your doctor will tell you how long it may take to get your results back. Follow-up care is a key part of your treatment and safety. Be sure to make and go to all appointments, and call your doctor if you are having problems. It's also a good idea to keep a list of the medicines you take. Ask your doctor when you can expect to have your test results. Where can you learn more? Go to http://rivka-jarrell.info/. Enter Y504 in the search box to learn more about \"Prostate Biopsy and Ultrasound: About This Test.\" Current as of: May 12, 2017 Content Version: 11.4 © 3901-2358 Healthwise, Incorporated. Care instructions adapted under license by [employer] (which disclaims liability or warranty for this information). If you have questions about a medical condition or this instruction, always ask your healthcare professional. [place] [address] any warranty or liability for your use of this information. Please provide this summary of care documentation to your next provider. Your primary care clinician is listed as Rigoberto Alvarez. If you have any questions after today's visit, please call [phone].

## 2018-07-04 NOTE — PROGRESS NOTES
Johnna Bass 70 y.o. male     Mr. Sukumar Sears seen today for evaluation of elevated PSA found on routine testing     patient has history of symptomatic BPH responding favorably to alpha-blocker therapy with Flomax   Family history significant for prostate carcinoma developing in father at age 61 and older brother developing prostate cancer at age 54    PSA 5.3 in May 2018    Review of Systems:   CNS: No seizure syncope headaches dizziness or visual changes  Respiratory: Chronic cough-no chest pain no wheezing - no shortness of breath  Cardiovascular: No angina no palpitations-DVT on Coumadin anticoagulation   intestinal: Dyspepsia diarrhea or constipation urinary: Irritative and obstructive voiding symptoms responding favorably to alpha-blocker therapy  Skeletal: Chronic low back pain  Endocrine: No diabetes or thyroid disease  Other:    Allergies: Allergies   Allergen Reactions    Amoxicillin Itching    Augmentin [Amoxicillin-Pot Clavulanate] Itching    Hibiclens [Chlorhexidine Gluconate] Itching    Penicillins Rash    Requip [Ropinirole] Nausea and Vomiting      Medications:    Current Outpatient Prescriptions   Medication Sig Dispense Refill    celecoxib (CELEBREX) 200 mg capsule Take  by mouth two (2) times a day.  lisinopril-hydroCHLOROthiazide (PRINZIDE, ZESTORETIC) 20-12.5 mg per tablet TAKE 1 TABLET BY MOUTH DAILY 90 Tab 0    diclofenac (VOLTAREN) 1 % gel Apply 4 g to affected area four (4) times daily. Maximum 16 grams per joint per day.  Dispense 5 100 gram tubes 5 Each 0    clotrimazole-betamethasone (LOTRISONE) topical cream Apply twice per day 45 g 1    warfarin (COUMADIN) 5 mg tablet TAKE 2 AND 1/2 TABLETS BY MOUTH DAILY OR AS DIRECTED 75 Tab 0    butalbital-acetaminophen-caff (FIORICET) -40 mg per capsule Take 2 capsules every 8 hours as needed for headache 90 Cap 3    warfarin (COUMADIN) 3 mg tablet Or as directed 30 Tab 3    raNITIdine (ZANTAC) 150 mg tablet Take 150 mg by mouth nightly. 5    tamsulosin (FLOMAX) 0.4 mg capsule Take 1 Cap by mouth daily. 30 Cap 2    labetalol (NORMODYNE) 100 mg tablet TAKE ONE TABLET BY MOUTH TWICE DAILY (Patient taking differently: TAKE ONE TABLET BY MOUTH DAILY) 180 Tab 0    metaxalone (SKELAXIN) 800 mg tablet Take 1 Tab by mouth three (3) times daily. Indications: Muscle Spasm (Patient taking differently: Take 800 mg by mouth three (3) times daily as needed. Indications: Muscle Spasm) 90 Tab 2    montelukast (SINGULAIR) 10 mg tablet TAKE 1 TABLET BY MOUTH EVERY DAY (Patient taking differently: TAKE 1 TABLET BY MOUTH EVERY HS) 90 Tab 0    lansoprazole (PREVACID) 30 mg capsule TAKE 1 CAPSULE DAILY BEFOREBREAKFAST 90 Cap 3    nystatin (MYCOSTATIN) topical cream APPLY EXTERNALLY TO THE AFFECTED AREA TWICE DAILY 15 g 0    HYDROcodone-acetaminophen (NORCO)  mg tablet Take 1 Tab by mouth every eight (8) hours as needed for Pain. Max Daily Amount: 3 Tabs. 40 Tab 0    azelastine (ASTELIN) 137 mcg (0.1 %) nasal spray 1 spray up each nostril twice per day (Patient taking differently: 1 Spray by Both Nostrils route daily. Using once per day) 1 Bottle 1    acetaminophen (TYLENOL) 500 mg tablet Take 1,000 mg by mouth every six (6) hours as needed for Pain.          Past Medical History:   Diagnosis Date    Arthritis     Bleeding     Blood clot in the legs     Chronic pain     knee and shoulder    Esophageal reflux     GERD (gastroesophageal reflux disease)     Headache(784.0)     migraine    High cholesterol     Hypertension     Lower back pain 11/6/2010    Other chest pain     Personal history of venous thrombosis and embolism     Pure hypercholesterolemia     Right buttock pain 11/6/2010    Right foot pain     Rotator cuff tear     left-since 2010, worsened tear Jan.    Spinal stenosis     Tendonitis, tibialis     anterior    Thromboembolus ([facility] [address]     3 after sx last one 2000      Past Surgical History:   Procedure Laterality Date    FOOT/TOES SURGERY PROC UNLISTED      HX BACK SURGERY     [facility]  06-25-12    Right foot with excision of bursa and adipose tissue from fifth metatarsal base by Dr. Bobby Van      lower back (1992 and 2000)    HX OTHER SURGICAL      left foot (2008)    LAMINOTOMY      NERVE BLOCK       Family History   Problem Relation Age of Onset   Bellatrix.Delgado Arthritis-rheumatoid Mother     Dementia Mother     Heart Disease Father     Cancer Father     Hypertension Other         Physical Examination: Well-nourished mature male in no apparent distress    Abdomen is nontender no palpable masses no organomegaly  Back-no percussion CVA tenderness on either side  No inguinal hernia or adenopathy  Penis is normal  Testes are normal in size shape and consistency bilaterally-no epididymal induration or tenderness on either side  Spermatic cords are palpably normal bilaterally  Scrotum is normal  Prostate by PHYLLIS is rounded, smooth, benign in consistency and nontender-no induration no nodularity  No rectal masses tenderness or induration      Urinalysis: Negative dipstick/nitrite negative/heme-negative      Impression: Elevated PSA with strong family history of prostate malignancy                       Within anticoagulation        Plan:  elevated PSA with  strong family history of prostate malignancy              Coumadin anticoagulation status for DVT disease    Counseled today regarding technique a transperineal prostate biopsy including risk of bleeding, infection, and urinary retention  Rx Cipro 500 mg twice daily ×3 days prep prophylaxis/attendant required    Schedule prostate biopsy will plan 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 Unrecognized errors in transcription may be present.

## 2018-07-06 ENCOUNTER — TELEPHONE (OUTPATIENT)
Dept: INTERNAL MEDICINE CLINIC | Age: 63
End: 2018-07-06

## 2018-07-06 ENCOUNTER — DOCUMENTATION ONLY (OUTPATIENT)
Dept: ORTHOPEDIC SURGERY | Facility: CLINIC | Age: 63
End: 2018-07-06

## 2018-07-06 ENCOUNTER — HOSPITAL ENCOUNTER (OUTPATIENT)
Dept: PHYSICAL THERAPY | Age: 63
Discharge: HOME | End: 2018-07-06
Payer: MEDICARE

## 2018-07-06 PROCEDURE — 97110 THERAPEUTIC EXERCISES: CPT

## 2018-07-06 NOTE — TELEPHONE ENCOUNTER
Prior Auth request received from Pharmacy for [phone] [facility]. Paperwork completed and faxed to insurance. Awaiting response.

## 2018-07-06 NOTE — PROGRESS NOTES
Patient dropped off Continuing Disability Claim Form to be completed. Patient is requesting a return to work date of 7/23/18 which would be after his 7/19/18 appointment.  Patient would like to pick-up form at [facility] location and can be reached at [phone]

## 2018-07-06 NOTE — PROGRESS NOTES
PT DAILY TREATMENT NOTE - [facility]     Patient Name: Mike Winter  Date:2018  : 1955  [x]  Patient  Verified  Payor: Isael Solders / Plan: [place] MEDIC[place] PART [place] & B / Product Type: Medicare /    In time:10:30  Out time:11:17  Total Treatment Time (min): 47  Total Timed Codes (min): 47  1:1 Treatment Time ( [address] only): 39   Visit #: 3 of 10    Treatment Area: Pain in left knee [M25.562]  Other acute postprocedural pain [G89.18]    SUBJECTIVE  Pain Level (0-10 scale): 2/10  Any medication changes, allergies to medications, adverse drug reactions, diagnosis change, or new procedure performed?: [x] No    [] Yes (see summary sheet for update)  Subjective functional status/changes:   [] No changes reported  Pt reports he went to his doctor and the doctor told him he has some tendonitis in his knee. f    OBJECTIVE    Modality rationale: decrease pain to improve the patients ability to ambulate without pain.    Min Type Additional Details    [] Estim:  []Unatt       []IFC  []Premod                        []Other:  []w/ice   []w/heat  Position:  Location:    [] Estim: []Att    []TENS instruct  []NMES                    []Other:  []w/US   []w/ice   []w/heat  Position:  Location:    []  Traction: [] Cervical       []Lumbar                       [] Prone          []Supine                       []Intermittent   []Continuous Lbs:  [] before manual  [] after manual    []  Ultrasound: []Continuous   [] Pulsed                           []1MHz   []3MHz W/cm2:  Location:    []  Iontophoresis with dexamethasone         Location: [] Take home patch   [] In clinic    []  Ice     []  heat  []  Ice massage  []  Laser   []  Anodyne Position:  Location:    []  Laser with stim  []  Other:  Position:  Location:   10 [x]  Vasopneumatic Device Pressure:       [x] lo [] med [] hi   Temperature: [x] lo [] med [] hi   [] Skin assessment post-treatment:  []intact []redness- no adverse reaction    []redness - adverse reaction:     37 min Therapeutic Exercise:  [] See flow sheet :   Rationale: increase ROM and increase strength to improve the patients ability to walk without pain. With   [] TE   [] TA   [] neuro   [] other: Patient Education: [x] Review HEP    [] Progressed/Changed HEP based on:   [] positioning   [] body mechanics   [] transfers   [] heat/ice application    [] other:      Other Objective/Functional Measures: Had to decrease weight on SAQ BBK due to pain. Pain Level (0-10 scale) post treatment: 2/10    ASSESSMENT/Changes in Function: Pt continued strengthening and ROM requiring verbal and tactile cues. Only had to modify SAQ BBK due to pain and inflammation. Patient will continue to benefit from skilled PT services to modify and progress therapeutic interventions, address functional mobility deficits, address ROM deficits, address strength deficits, analyze and address soft tissue restrictions, analyze and cue movement patterns and analyze and modify body mechanics/ergonomics to attain remaining goals. [x]  See Plan of Care  []  See progress note/recertification  []  See Discharge Summary         Progress towards goals / Updated goals:  Progress towards goals / Updated goals:  1. Pt will improve FOTO by 16 points in order to demonstrate functional improvement. Not met 2 point improvement due to decline since last assessed (18)  2. Pt will improve left knee flexion AROM to 120 dgrs in order to improve gait pattern and ease of work tasks. Progressin degrees AROM, 113 degrees PROM (18)  3. Pt will improve left knee MMT to 4+/5 in order to improve ease of work tasks. Progressin/5 (18)  4. Pt will ascend and descend 4 steps with reciprocal pattern to increase efficiency with stair negotiation.  Met (18)    PLAN  []  Upgrade activities as tolerated     [x]  Continue plan of care  []  Update interventions per flow sheet       []  Discharge due to:_  []  Other:_      Ethyl Crisp, PTA 7/6/2018  9:20 AM    Future Appointments  Date Time Provider Dex Hatfield   7/6/2018 10:30 AM [facility] PT [facility] [address] [facility]   7/12/2018 10:30 AM [facility] PT [facility] [address] [facility]   7/19/2018 9:45 AM Dixie Cho PA-C [facility]   7/19/2018 10:30 AM [facility] PT [facility] [address] [facility]   8/15/2018 3:00 PM Zaid Leger MD [address] [place] [address]   8/20/2018 3:30 PM January Resendiz  [address]   8/22/2018 3:30 PM Zaid Leger MD [address] [place] [address]   8/30/2018 4:00 PM Jose Cruz Bruce  [address] [place]

## 2018-07-11 NOTE — TELEPHONE ENCOUNTER
Approval received for Fioricet 216 quantity. I have attempted to contact this patient by phone with the following results: no answer.  Voice mail box not set up

## 2018-07-12 ENCOUNTER — HOSPITAL ENCOUNTER (OUTPATIENT)
Dept: PHYSICAL THERAPY | Age: 63
Discharge: HOME | End: 2018-07-12
Payer: MEDICARE

## 2018-07-12 PROCEDURE — 97110 THERAPEUTIC EXERCISES: CPT

## 2018-07-12 NOTE — TELEPHONE ENCOUNTER
Patient is due 80 qty for the remainder of the 90 days. Please send in prescription for 126 qty.     Requested Prescriptions     Pending Prescriptions Disp Refills    butalbital-acetaminophen-caff (FIORICET) -40 mg per capsule 90 Cap 3     Sig: Take 2 capsules every 8 hours as needed for headache

## 2018-07-12 NOTE — PROGRESS NOTES
PT DAILY TREATMENT NOTE - [facility]     Patient Name: Crow Mcclellan  Date:2018  : 1955  [x]  Patient  Verified  Payor: Kristinjuan luis Gomez / Plan: [place] MEDICARE PART A & B / Product Type: Medicare /    In time:10:30  Out time:11:15  Total Treatment Time (min): 45  Total Timed Codes (min): 35  1:1 Treatment Time ( [address] only): 40  Visit #:4 of 10    Treatment Area: Pain in left knee [M25.562]  Other acute postprocedural pain [G89.18]    SUBJECTIVE  Pain Level (0-10 scale): Any medication changes, allergies to medications, adverse drug reactions, diagnosis change, or new procedure performed?: [x] No    [] Yes (see summary sheet for update)  Subjective functional status/changes:   [] No changes reported  \"This tendonitis has been hurting me\". OBJECTIVE  Modality rationale: decrease pain to improve the patients ability to walk without pain.    Min Type Additional Details    [] Estim:  []Unatt       []IFC  []Premod                        []Other:  []w/ice   []w/heat  Position:  Location:    [] Estim: []Att    []TENS instruct  []NMES                    []Other:  []w/US   []w/ice   []w/heat  Position:  Location:    []  Traction: [] Cervical       []Lumbar                       [] Prone          []Supine                       []Intermittent   []Continuous Lbs:  [] before manual  [] after manual    []  Ultrasound: []Continuous   [] Pulsed                           []1MHz   []3MHz W/cm2:  Location:    []  Iontophoresis with dexamethasone         Location: [] Take home patch   [] In clinic   10 [x]  Ice     []  heat  []  Ice massage  []  Laser   []  Anodyne Position:semi-acevedo w/wedge  Location:left knee    []  Laser with stim  []  Other:  Position:  Location:    []  Vasopneumatic Device Pressure:       [] lo [] med [] hi   Temperature: [] lo [] med [] hi   [] Skin assessment post-treatment:  []intact []redness- no adverse reaction    []redness - adverse reaction:     35 min Therapeutic Exercise:  [] See flow sheet : Rationale: increase ROM and increase strength to improve the patients ability to perform ADL's without pain. hj          With   [x] TE   [] TA   [] neuro   [] other: Patient Education: [x] Review HEP    [] Progressed/Changed HEP based on:   [] positioning   [] body mechanics   [] transfers   [] heat/ice application    [] other:      Other Objective/Functional Measures: Pt increase repetitions with SLR and SAQ BBK. Pain Level (0-10 scale) post treatment: 2/10    ASSESSMENT/Changes in Function: Pt continued strengthening and ROM TE requiring verbal and tactile cues. Pt did not experience pain during TE. Patient will continue to benefit from skilled PT services to modify and progress therapeutic interventions, address functional mobility deficits, address ROM deficits, address strength deficits, analyze and address soft tissue restrictions and analyze and cue movement patterns to attain remaining goals. [x]  See Plan of Care  []  See progress note/recertification  []  See Discharge Summary         Progress towards goals / Updated goals:  1. Pt will improve FOTO by 16 points in order to demonstrate functional improvement. Not met 2 point improvement due to decline since last assessed (18)  2. Pt will improve left knee flexion AROM to 120 dgrs in order to improve gait pattern and ease of work tasks. Progressin degrees AROM, 113 degrees PROM (18)  3. Pt will improve left knee MMT to 4+/5 in order to improve ease of work tasks. Progressin/5 (18)  4. Pt will ascend and descend 4 steps with reciprocal pattern to increase efficiency with stair negotiation.  Met (18)       PLAN  []  Upgrade activities as tolerated     [x]  Continue plan of care  []  Update interventions per flow sheet       []  Discharge due to:_  []  Other:_      Martha Mejia, PTA 2018  10:47 AM    Future Appointments  Date Time Provider Dex Hatfield   2018 9:45 AM Jeri Kellogg PA-C [facility] SCHED   7/19/2018 10:30 AM [facility] PT [address] [facility]   8/15/2018 3:00 PM Svetlana Garay MD [address] [place] [address]   8/20/2018 3:30 PM Mikki Liang  [address]   8/22/2018 3:30 PM Svetlana Garay MD [address] [place] [address]   8/30/2018 4:00 PM Paul Collazo  [address] [place]

## 2018-07-17 RX ORDER — BUTALBITAL, ACETAMINOPHEN AND CAFFEINE 300; 40; 50 MG/1; MG/1; MG/1
CAPSULE ORAL
Qty: 126 CAP | Refills: 1 | Status: SHIPPED | OUTPATIENT
Start: 2018-07-17 | End: 2018-12-20 | Stop reason: SDUPTHER

## 2018-07-19 ENCOUNTER — OFFICE VISIT (OUTPATIENT)
Dept: ORTHOPEDIC SURGERY | Facility: CLINIC | Age: 63
End: 2018-07-19

## 2018-07-19 ENCOUNTER — HOSPITAL ENCOUNTER (OUTPATIENT)
Dept: PHYSICAL THERAPY | Age: 63
Discharge: HOME | End: 2018-07-19
Payer: MEDICARE

## 2018-07-19 VITALS
RESPIRATION RATE: 18 BRPM | SYSTOLIC BLOOD PRESSURE: 134 MMHG | DIASTOLIC BLOOD PRESSURE: 71 MMHG | OXYGEN SATURATION: 96 % | HEART RATE: 79 BPM | BODY MASS INDEX: 31.5 KG/M2 | TEMPERATURE: 96.6 F | HEIGHT: 70 IN | WEIGHT: 220 LBS

## 2018-07-19 DIAGNOSIS — M70.52 PES ANSERINUS BURSITIS OF LEFT KNEE: ICD-10-CM

## 2018-07-19 DIAGNOSIS — M76.32 ILIOTIBIAL BAND TENDINITIS OF LEFT SIDE: Primary | ICD-10-CM

## 2018-07-19 DIAGNOSIS — Z96.652 STATUS POST TOTAL LEFT KNEE REPLACEMENT: ICD-10-CM

## 2018-07-19 PROCEDURE — 97110 THERAPEUTIC EXERCISES: CPT

## 2018-07-19 PROCEDURE — G8979 MOBILITY GOAL STATUS: HCPCS

## 2018-07-19 PROCEDURE — G8980 MOBILITY D/C STATUS: HCPCS

## 2018-07-19 RX ORDER — CLINDAMYCIN HYDROCHLORIDE 300 MG/1
CAPSULE ORAL
Qty: 6 CAP | Refills: 1 | Status: SHIPPED | OUTPATIENT
Start: 2018-07-19 | End: 2018-08-30 | Stop reason: ALTCHOICE

## 2018-07-19 NOTE — PROGRESS NOTES
[address] [facility], [address] [facility]  [phone]           Patient: Indigo Ramos                MRN: [id number]       SSN: [id number]  YOB: 1955        AGE: 70 y.o. SEX: male  Body mass index is 31.57 kg/(m^2). PCP: Pooja Resendiz MD  07/19/18      This office note has been dictated. REVIEW OF SYSTEMS:  Constitutional: Negative for fever, chills, weight loss and malaise/fatigue. HENT: Negative. Eyes: Negative. Respiratory: Negative. Cardiovascular: Negative. Gastrointestinal: No bowel incontinence or constipation. Genitourinary: No bladder incontinence or saddle anesthesia. Skin: Negative. Neurological: Negative. Endo/Heme/Allergies: Negative. Psychiatric/Behavioral: Negative. Musculoskeletal: As per HPI above.      Past Medical History:   Diagnosis Date    Arthritis     Bleeding     Blood clot in the legs     Chronic pain     knee and shoulder    Esophageal reflux     GERD (gastroesophageal reflux disease)     Headache(784.0)     migraine    High cholesterol     Hypertension     Lower back pain 11/6/2010    Other chest pain     Personal history of venous thrombosis and embolism     Pure hypercholesterolemia     Right buttock pain 11/6/2010    Right foot pain     Rotator cuff tear     left-since 2010, worsened tear Young.    Spinal stenosis     Tendonitis, tibialis     anterior    Thromboembolus ([facility] [address]     3 after sx last one 2000         Current Outpatient Prescriptions:     butalbital-acetaminophen-caff (FIORICET) -40 mg per capsule, Take 2 capsules every 8 hours as needed for headache, Disp: 126 Cap, Rfl: 1    celecoxib (CELEBREX) 200 mg capsule, Take  by mouth two (2) times a day., Disp: , Rfl:     lisinopril-hydroCHLOROthiazide (PRINZIDE, ZESTORETIC) 20-12.5 mg per tablet, TAKE 1 TABLET BY MOUTH DAILY, Disp: 90 Tab, Rfl: 0    diclofenac (VOLTAREN) 1 % gel, Apply 4 g to affected area four (4) times daily. Maximum 16 grams per joint per day. Dispense 5 100 gram tubes, Disp: 5 Each, Rfl: 0    clotrimazole-betamethasone (LOTRISONE) topical cream, Apply twice per day, Disp: 45 g, Rfl: 1    warfarin (COUMADIN) 5 mg tablet, TAKE 2 AND 1/2 TABLETS BY MOUTH DAILY OR AS DIRECTED, Disp: 75 Tab, Rfl: 0    warfarin (COUMADIN) 3 mg tablet, Or as directed, Disp: 30 Tab, Rfl: 3    raNITIdine (ZANTAC) 150 mg tablet, Take 150 mg by mouth nightly., Disp: , Rfl: 5    tamsulosin (FLOMAX) 0.4 mg capsule, Take 1 Cap by mouth daily. , Disp: 30 Cap, Rfl: 2    labetalol (NORMODYNE) 100 mg tablet, TAKE ONE TABLET BY MOUTH TWICE DAILY (Patient taking differently: TAKE ONE TABLET BY MOUTH DAILY), Disp: 180 Tab, Rfl: 0    metaxalone (SKELAXIN) 800 mg tablet, Take 1 Tab by mouth three (3) times daily. Indications: Muscle Spasm (Patient taking differently: Take 800 mg by mouth three (3) times daily as needed. Indications: Muscle Spasm), Disp: 90 Tab, Rfl: 2    montelukast (SINGULAIR) 10 mg tablet, TAKE 1 TABLET BY MOUTH EVERY DAY (Patient taking differently: TAKE 1 TABLET BY MOUTH EVERY HS), Disp: 90 Tab, Rfl: 0    lansoprazole (PREVACID) 30 mg capsule, TAKE 1 CAPSULE DAILY BEFOREBREAKFAST, Disp: 90 Cap, Rfl: 3    acetaminophen (TYLENOL) 500 mg tablet, Take 1,000 mg by mouth every six (6) hours as needed for Pain., Disp: , Rfl:     nystatin (MYCOSTATIN) topical cream, APPLY EXTERNALLY TO THE AFFECTED AREA TWICE DAILY, Disp: 15 g, Rfl: 0    HYDROcodone-acetaminophen (NORCO)  mg tablet, Take 1 Tab by mouth every eight (8) hours as needed for Pain. Max Daily Amount: 3 Tabs., Disp: 40 Tab, Rfl: 0    azelastine (ASTELIN) 137 mcg (0.1 %) nasal spray, 1 spray up each nostril twice per day (Patient taking differently: 1 Spray by Both Nostrils route daily.  Using once per day), Disp: 1 Bottle, Rfl: 1    Allergies   Allergen Reactions    Amoxicillin Itching    Augmentin [Amoxicillin-Pot Clavulanate] Itching    Hibiclens [Chlorhexidine Gluconate] Itching    Penicillins Rash    Requip [Ropinirole] Nausea and Vomiting       Social History     Social History    Marital status:      Spouse name: N/A    Number of children: N/A    Years of education: N/A     Occupational History    Not on file. Social History Main Topics    Smoking status: Never Smoker    Smokeless tobacco: Never Used    Alcohol use No    Drug use: No    Sexual activity: Not Currently     Other Topics Concern    Not on file     Social History Narrative       Past Surgical History:   Procedure Laterality Date    FOOT/TOES SURGERY PROC UNLISTED      HX BACK SURGERY      HX ORTHOPAEDIC  06-25-12    Right foot with excision of bursa and adipose tissue from fifth metatarsal base by Dr. Harshil Sahni      lower back (1992 and 2000)    HX OTHER SURGICAL      left foot (2008)    LAMINOTOMY      NERVE BLOCK           We did see Mr. Kailey Womack for followup with regards to his left knee replacement. The patient is now [age], almost four months, status post knee replacement surgery. He has been progressing nicely. He did have some knee discomfort, which is improving. He has been working hard in physical therapy, as well as on his own. He denies any fevers or chills. He has had no systemic changes and no injuries. PHYSICAL EXAMINATION:  In general, the patient is alert and oriented x 3 in no acute distress. The patient is well-developed, well-nourished, with a normal affect. The patient is afebrile. HEENT:  Head is normocephalic and atraumatic. Pupils are equally round and reactive to light and accommodation. Extraocular eye movements are intact. Neck is supple. Trachea is midline. No JVD is present. Breathing is nonlabored. Examination of the lower extremities reveals pain-free range of motion of the hips. There is no pain to palpation of the greater trochanteric bursae.  There is negative straight leg raise. There is negative calf tenderness. There is negative Giorgios. There is no evidence of DVT present. Examination of the left knee reveals the skin is intact. The surgical wound is healed nicely. There is no erythema, ecchymosis, and no warmth. There are no signs for infection or cellulitis present. Range of motion is full with very good stability. The patella tracks nicely without rubs or crepitus noted. There is a little tenderness to palpation to the pes bursa and a little tenderness to the IT band laterally. ASSESSMENT:      1. Status post left knee replacement. 2. IT band tendinitis, mild. 3. Pes bursitis, mild. PLAN:  At this point, the patient will continue using the Voltaren Gel. He has this at home. He was given a note to return to work on Monday without restrictions. He will continue with a home exercise program.  We will plan on seeing him back in three months for reevaluation. We did send a prescription for prophylactic antibiotics to the pharmacy, as he has an upcoming dental appointment, as well as a prostate biopsy coming up.                    JR Willie GATES, PA-C, ATC

## 2018-07-19 NOTE — PROGRESS NOTES
[facility] - Erica Jefferson  [address] [facility]  [phone] fax    Physical Therapy Discharge Summary    Patient name: Indigo Ramos Start of Care: 18   Referral source: Jacki Bravo MD : [id number]   Medical/Treatment Diagnosis: Pain in left knee [M25.562]  Other acute postprocedural pain [G89.18] Onset Date: 3/27/18     Prior Hospitalization: see medical history Provider#: [id number]   Medications: Verified on Patient Summary List    Comorbidities:  HTN, cervical/lumbar spinal stenosis with fusions, arthritis, hx of DVTs, torn left RTC - planning on surgery once left TKA is healed  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 Status at last note/certification: 41  Status at discharge: not met    Goal: Pt will improve left knee flexion AROM to 120 dgrs in order to improve gait pattern and ease of work tasks  Status at last note/certification:  472 degrees  Status at discharge: not met    Goal:  Pt will improve left knee MMT to 4+/5 in order to improve ease of work tasks. Status at last note/certification: 3+/5  Status at discharge: not met    Goal: Pt will ascend and descend 4 steps with reciprocal pattern to increase efficiency with stair negotiation. Status at last note/certification: unable  Status at discharge: not met    Pt. Has progressed well with physical therapy. Left knee AROM 2-115 degrees. Left knee MMT ext: 4/5 flex: 4+/5. He continues to ambulate with SPC in community and has complaints of occasional knee buckling. FOTO score has improved to 51 points.  He was educated on continuing with his HEP following D/C. G-Codes (GP)  Mobility   [id number] Goal  CK= 40-59%  D/C  CK= 40-59%    The severity rating is based on clinical judgment and the FOTO score.       ASSESSMENT/RECOMMENDATIONS:  [x]Discontinue therapy: [x]Patient has reached or is progressing toward set goals      []Patient is non-compliant or has abdicated      []Due to lack of appreciable progress towards set goals    Chelsey Alonso, PT 7/19/2018 3:50 PM

## 2018-07-19 NOTE — PROGRESS NOTES
PT DAILY TREATMENT NOTE - [facility]     Patient Name: Jory Phelan  Date:2018  : 1955  [x]  Patient  Verified  Payor: Renee Gutierrez / Plan: [place] / Product Type: Medicare /    In time: 3:00  Out time: 3:30  Total Treatment Time (min): 30  Total Timed Codes (min): 30  1:1 Treatment Time ( [address] only): 30   Visit #: 5 of 10    Treatment Area: Pain in left knee [M25.562]  Other acute postprocedural pain [G89.18]    SUBJECTIVE  Pain Level (0-10 scale): 2-3/10  Any medication changes, allergies to medications, adverse drug reactions, diagnosis change, or new procedure performed?: [x] No    [] Yes (see summary sheet for update)  Subjective functional status/changes:   [] No changes reported  Pt. Reports he followed back up with his doctor and would like to make today his last day. OBJECTIVE    30 min Therapeutic Exercise:  [x] See flow sheet :   Rationale: increase ROM and increase strength to improve the patients ability to increase ease of ADLs          With   [x] TE   [] TA   [] neuro   [] other: Patient Education: [x] Review HEP    [] Progressed/Changed HEP based on:   [] positioning   [] body mechanics   [] transfers   [] heat/ice application    [] other:      Other Objective/Functional Measures:   Left knee AROM: 2-115 degrees  Left knee MMT: ext: 4/5 flex: 4+/5   Pt.  Required cues to perform hip abduction correctly  FOTO: 51 points    Pain Level (0-10 scale) post treatment: 2/10    ASSESSMENT/Changes in Function:      []  See Plan of Care  []  See progress note/recertification  [x]  See Discharge Summary         Progress towards goals / Updated goals:  See D/C note    PLAN  []  Upgrade activities as tolerated     []  Continue plan of care  []  Update interventions per flow sheet       [x]  Discharge due to:_ progress towards goals  []  Other:_      Evelyn Living, PT 2018  3:05 PM    Future Appointments  Date Time Provider [place]   8/15/2018 3:00 PM Laura Taveras Nan Lacy, [facility]   8/20/2018 3:30 PM Abel Essex,  [address]    8/22/2018 3:30 PM Scott Urbina MD [address] [place] [address]   8/30/2018 4:00 PM Biju Potter  [facility],  [address] [place]   10/11/2018 8:30 AM Akash Bartlett PA-C [facility] 69

## 2018-07-19 NOTE — LETTER
NOTIFICATION RETURN TO WORK 
 
7/19/2018 10:14 AM 
 
Mr. Trent Blancas [address] [facility] [address] To Whom It May Concern: 
 
Trent Blancas is currently under the care of [address] [place] [address]. He will return to work on: July 23, 2018 If there are questions or concerns please have the patient contact our office.  
 
 
 
Sincerely, 
 
 
Adriana Kaminski PA-C

## 2018-07-20 ENCOUNTER — TELEPHONE (OUTPATIENT)
Dept: ORTHOPEDIC SURGERY | Age: 63
End: 2018-07-20

## 2018-07-20 NOTE — TELEPHONE ENCOUNTER
Patient is calling to check status of forms that were dropped off on 7/6/18. He states yesterday at his appointment he was told they would be faxed or he would be called by our office to  them up today 7/20/18.  Patient has not had a pay check for the month of July    He is suppose to go back to work on Monday 7/23/18

## 2018-07-23 NOTE — TELEPHONE ENCOUNTER
Form was completed and faxed to the [facility] office as requested by patient. Spoke with [email or web address] and informed her the form was completed and faxed to [facility]. Per her request, form was faxed to number on disability form.

## 2018-07-26 RX ORDER — CELECOXIB 200 MG/1
200 CAPSULE ORAL 2 TIMES DAILY
Qty: 180 CAP | Refills: 0 | Status: SHIPPED | OUTPATIENT
Start: 2018-07-26 | End: 2018-10-30 | Stop reason: SDUPTHER

## 2018-07-26 NOTE — TELEPHONE ENCOUNTER
Last Visit: 07/19/2018 with CARA Mistry    Next Appointment: 10/11/2018 with CARA Mistry   Previous Refill Encounters: 03/28/2018 per CARA Mistry #[phone] with 2 refills     Requested Prescriptions     Pending Prescriptions Disp Refills    celecoxib (CELEBREX) 200 mg capsule 180 Cap 0     Sig: Take 1 Cap by mouth two (2) times a day.

## 2018-08-01 RX ORDER — TAMSULOSIN HYDROCHLORIDE 0.4 MG/1
CAPSULE ORAL
Qty: 30 CAP | Refills: 0 | Status: SHIPPED | OUTPATIENT
Start: 2018-08-01 | End: 2018-08-31 | Stop reason: SDUPTHER

## 2018-08-06 DIAGNOSIS — K21.9 GASTROESOPHAGEAL REFLUX DISEASE WITHOUT ESOPHAGITIS: ICD-10-CM

## 2018-08-06 RX ORDER — MONTELUKAST SODIUM 10 MG/1
TABLET ORAL
Qty: 90 TAB | Refills: 0 | Status: SHIPPED | OUTPATIENT
Start: 2018-08-06 | End: 2018-08-15

## 2018-08-06 RX ORDER — WARFARIN SODIUM 5 MG/1
TABLET ORAL
Qty: 75 TAB | Refills: 0 | Status: SHIPPED | OUTPATIENT
Start: 2018-08-06 | End: 2019-09-29 | Stop reason: SDUPTHER

## 2018-08-06 RX ORDER — LANSOPRAZOLE 30 MG/1
CAPSULE, DELAYED RELEASE ORAL
Qty: 90 CAP | Refills: 3 | Status: SHIPPED | OUTPATIENT
Start: 2018-08-06 | End: 2019-06-17 | Stop reason: SDUPTHER

## 2018-08-06 NOTE — TELEPHONE ENCOUNTER
Requested Prescriptions     Pending Prescriptions Disp Refills    lansoprazole (PREVACID) 30 mg capsule 90 Cap 3

## 2018-08-07 RX ORDER — LANSOPRAZOLE 30 MG/1
CAPSULE, DELAYED RELEASE ORAL
Qty: 90 CAP | Refills: 3 | Status: SHIPPED | OUTPATIENT
Start: 2018-08-07 | End: 2018-08-15

## 2018-08-08 ENCOUNTER — TELEPHONE (OUTPATIENT)
Dept: UROLOGY | Age: 63
End: 2018-08-08

## 2018-08-08 NOTE — TELEPHONE ENCOUNTER
Spoke to Mrs. Dylon Roy and she will call Dr. Tesha Quinonez office to get clearance for a Prostate biopsy. I sent Dr. Tesha Quinonez his office note back in July.

## 2018-08-09 ENCOUNTER — TELEPHONE (OUTPATIENT)
Dept: INTERNAL MEDICINE CLINIC | Age: 63
End: 2018-08-09

## 2018-08-09 ENCOUNTER — ANTI-COAG VISIT (OUTPATIENT)
Dept: INTERNAL MEDICINE CLINIC | Age: 63
End: 2018-08-09

## 2018-08-09 DIAGNOSIS — Z79.01 WARFARIN ANTICOAGULATION: ICD-10-CM

## 2018-08-09 DIAGNOSIS — Z86.718 PERSONAL HISTORY OF VENOUS THROMBOSIS AND EMBOLISM: ICD-10-CM

## 2018-08-09 LAB
INR BLD: 1.6
PT POC: NORMAL SECONDS
VALID INTERNAL CONTROL?: YES

## 2018-08-09 NOTE — TELEPHONE ENCOUNTER
Prior Auth request received from Pharmacy for [address] [place] initiated on [email or web address]. Awaiting response.

## 2018-08-09 NOTE — PROGRESS NOTES
Mr. Celeste Tran is here today for anticoagulation monitoring for the diagnosis of DVT. His INR goal is 2.0-3.0 and his current Coumadin dose is 8 mg 3 days a week and 10 mg 4 days a week. Today's findings include an INR of 1.6 (normal INR range 0.8-1.2)     Considering Mr. Winters's past history, todays findings, and per the coumadin policy/protocol, Mr. Ady Lopez was instructed to take Coumadin as follows,  10mg 5 days a week and 8mg 2 days a week . He was also instructed to schedule an appointment in 2 weeks prior to leaving for an INR check. A full discussion of the nature of anticoagulants has been carried out. A full discussion of the need for frequent and regular monitoring, precise dosage adjustment and compliance was stressed. Side effects of potential bleeding were discussed and Mr. Ady Lopez was instructed to call [phone] if there are any signs of abnormal bleeding. Mr. Ady Lopez was instructed to avoid any OTC items containing aspirin or ibuprofen and prior to starting any new OTC products to consult with his physician or pharmacist to ensure no drug interactions are present. Mr. Ady Lopez was instructed to avoid any major changes in his general diet and to avoid alcohol consumption. .    Mr. Ady Lopez verbalized his understanding of all instructions and will call the office with any questions, concerns, or signs of abnormal bleeding or blood clot.

## 2018-08-15 ENCOUNTER — OFFICE VISIT (OUTPATIENT)
Dept: UROLOGY | Age: 63
End: 2018-08-15

## 2018-08-15 ENCOUNTER — HOSPITAL ENCOUNTER (OUTPATIENT)
Dept: LAB | Age: 63
Discharge: HOME | End: 2018-08-15
Payer: MEDICARE

## 2018-08-15 VITALS
BODY MASS INDEX: 31.92 KG/M2 | SYSTOLIC BLOOD PRESSURE: 139 MMHG | HEIGHT: 70 IN | DIASTOLIC BLOOD PRESSURE: 78 MMHG | WEIGHT: 223 LBS | HEART RATE: 95 BPM | OXYGEN SATURATION: 94 %

## 2018-08-15 DIAGNOSIS — R97.20 ELEVATED PSA: Primary | ICD-10-CM

## 2018-08-15 LAB
BILIRUB UR QL STRIP: NEGATIVE
GLUCOSE UR-MCNC: NEGATIVE MG/DL
KETONES P FAST UR STRIP-MCNC: NEGATIVE MG/DL
PH UR STRIP: 5.5 [PH] (ref 4.6–8)
PROT UR QL STRIP: NEGATIVE
SP GR UR STRIP: 1.02 (ref 1–1.03)
UA UROBILINOGEN AMB POC: NORMAL (ref 0.2–1)
URINALYSIS CLARITY POC: CLEAR
URINALYSIS COLOR POC: YELLOW
URINE BLOOD POC: NEGATIVE
URINE LEUKOCYTES POC: NEGATIVE
URINE NITRITES POC: NEGATIVE

## 2018-08-15 PROCEDURE — G0416 PROSTATE BIOPSY, ANY MTHD: HCPCS | Performed by: UROLOGY

## 2018-08-15 RX ORDER — CIPROFLOXACIN 500 MG/1
TABLET ORAL 2 TIMES DAILY
COMMUNITY
End: 2018-08-20 | Stop reason: ALTCHOICE

## 2018-08-15 NOTE — PATIENT INSTRUCTIONS
Prostate Biopsy and Ultrasound: About This Test  What is it? A prostate biopsy is a type of test. Your doctor takes small tissue samples from your prostate gland. Then another doctor looks at the tissue under a microscope to see if there are cancer cells. This test is done by a doctor who specializes in men's genital and urinary problems (urologist). It can be done in your doctor's office, a day surgery clinic, or a hospital operating room. To get the tissue samples from the prostate, the doctor inserts a thin needle through the rectum, the urethra, or the area between the anus and scrotum (perineum). The most common method is through the rectum. Your doctor may use ultrasound to help guide the needle. Why is this test done? You may need a prostate biopsy if your doctor found something of concern during a digital rectal exam. Or you may need it if a blood test showed a high level of prostate-specific antigen (PSA). A biopsy can help find out if you have prostate cancer. How can you prepare for this test?  Before you have a prostate biopsy, tell your doctor if:  · You are taking any medicines or using any herbal supplements. · You are allergic to any medicines. · You have had bleeding problems, or you take aspirin or some other blood thinner. What happens before the test?  · You may need to have an enema before the test.  · You will need to take off all or most of your clothes. You will be given a cloth or paper gown to use during the test.  · You may be given a sedative through a vein (IV) in your arm. The sedative will help you relax and stay still. What happens during the test?  Through the rectum  · You may be asked to kneel, lie on your side, or lie on your back. · Your doctor may inject an anesthetic around the prostate to numb the area before the sample is taken. · Ultrasound is often used to guide the needle to the correct spot. · Your doctor may choose to use a needle guide for the biopsy. He or she will attach the guide to a finger. Your doctor will insert the finger into your rectum. · The needle will enter the prostate and take 6 to 12 samples. Through the urethra  · You will lie on your back. Your feet will rest in stirrups. · You will get anesthesia. The anesthesia may make you sleep. Or it may just numb the area being worked on.  · Your doctor will insert a lighted scope (cystoscope) into your urethra. The scope allows your doctor to look directly at the prostate. Your doctor will pass a cutting loop through the scope to remove samples of prostate tissue. Through the perineum  · You will lie on an exam table either on your side or on your back with your knees bent. You will get anesthesia. The anesthesia may make you sleep. Or it may just numb the area being worked on.  · Your doctor will make a small cut in your perineum. Then he or she will insert a finger into the rectum to hold the prostate. · Your doctor will then insert the needle through the cut and into the prostate. · The needle collects samples of tissue and is then pulled out. What else should you know about this test?  · A prostate biopsy has a slight risk of causing problems such as infection or bleeding. · If the biopsy went through your rectum, you may have a small amount of bleeding from your rectum for 2 to 3 days after the biopsy. · You may have a little pain in your pelvic area. You may also have a little blood in your urine for 1 to 5 days. · You may have some blood in your semen for a week or longer. How long will the test take? · This test will take about 15 to 45 minutes. What happens after the test?  Your doctor will tell you what to expect and watch for after your test. In general:  · If you have anesthesia that makes you sleep, you will be in a recovery room for a few hours. You will need someone to drive you home. You may feel tired for the rest of the day.   · You will probably be able to go home right away.  · If your doctor had you stop taking any medicine for the biopsy, ask him or her when you can start taking it again. If you were given antibiotics, take them as directed. · Do not do heavy work or exercise for 4 hours after the test.  · Your doctor will tell you how long it may take to get your results back. Follow-up care is a key part of your treatment and safety. Be sure to make and go to all appointments, and call your doctor if you are having problems. It's also a good idea to keep a list of the medicines you take. Ask your doctor when you can expect to have your test results. Where can you learn more? Go to http://rivka-jarrell.info/. Enter Y504 in the search box to learn more about \"Prostate Biopsy and Ultrasound: About This Test.\"  Current as of: May 12, 2017  Content Version: 11.7  © 8207-0078 [employer]. Care instructions adapted under license by [employer] (which disclaims liability or warranty for this information). If you have questions about a medical condition or this instruction, always ask your healthcare professional. [address] any warranty or liability for your use of this information.

## 2018-08-15 NOTE — PROGRESS NOTES
[facility]  OFFICE PROCEDURE PROGRESS NOTE        Chart reviewed for the following:   IMaria LPN, have reviewed the History, Physical and updated the Allergic reactions for [address] performed immediately prior to start of procedure:   Brayden Noonan LPN, have performed the following reviews on Candice Pan prior to the start of the procedure:            * Patient was identified by name and date of birth   * Agreement on procedure being performed was verified  * Risks and Benefits explained to the patient  * Procedure site verified and marked as necessary  * Patient was positioned for comfort  * Consent was signed and verified     Time: 3:43PM      Date of procedure: 8/15/2018    Procedure performed by:  Padmini Barcenas MD    Provider assisted by: Karol Banuelos LPN    Patient assisted by: self    How tolerated by patient: tolerated the procedure well with no complications    Post Procedural Pain Scale: 0 - No Hurt    Comments: none    Patient verbalized understanding of procedure and post procedure instructions. RBV Per Dr. Thacker Lobe prostate tissue obtained today via prostate biopsy sent for pathology.

## 2018-08-15 NOTE — PROGRESS NOTES
Mr. Gibbs  has a reminder for a \"due or due soon\" health maintenance. I have asked that he contact his primary care provider for follow-up on this health maintenance.

## 2018-08-15 NOTE — MR AVS SNAPSHOT
[address] [facility] [address] [address] 
[phone] Patient: Louis Bishop MRN: [id number] XOL:[id number] Visit Information Date & Time Provider Department Dept. Phone Encounter #  
 8/15/2018  3:00 PM Mary Carmona [facility] [phone] [id number] Your Appointments 8/20/2018  3:30 PM  
Follow Up with Kaelyn Bernabe MD  
[facility] [address] [facility]) Appt Note: 3 MO F/U  
 [address] [facility] [address]  
[address] [facility] [address] [facility] [facility] [address]  
  
    
 8/22/2018  3:30 PM  
Office Visit with Shahriar Moody MD  
[facility] [address]) Appt Note: Bx results [address] [facility] [address] [address]  
[phone] [address] [address]  
  
    
 8/30/2018  4:00 PM  
Follow Up with Valentino Krueger MD  
[facility] [place] [address]) Appt Note: 3 mo f/u  
 [address] [facility], [address] [place] [address]  
[phone]  
  
   
 [address] [facility], [address] [facility] [address]  
  
    
 10/11/2018  8:30 AM  
Follow Up with Alexander Lenz PA-C  
[facility] [place] [address] [address] [facility]) Appt Note: 3 M FU LT KNEE  
 [address] [facility], [address] [facility] [address] [phone]  
  
   
 [address] [facility], [address] [place] [address] Upcoming Health Maintenance Date Due Hepatitis C Screening 1955 ZOSTER VACCINE AGE 60> 2/13/2013 GLAUCOMA SCREENING Q2Y 4/13/2018 Pneumococcal 65+ Low/Medium Risk (1 of 2 - PCV13) 4/13/2018 MEDICARE YEARLY EXAM 5/2/2018 Influenza Age 5 to Adult 8/1/2018 DTaP/Tdap/Td series (2 - Td) 12/6/2024 COLONOSCOPY 5/27/2026 Allergies as of 8/15/2018  Review Complete On: 8/15/2018 By: Maricruz Bass LPN  
 Severity Noted Reaction Type Reactions Amoxicillin  07/03/2018    Itching Augmentin [Amoxicillin-pot Clavulanate]    Itching Hibiclens [Chlorhexidine Gluconate]  03/16/2018    Itching Penicillins    Rash Requip [Ropinirole]  05/30/2018    Nausea and Vomiting Current Immunizations  Reviewed on 6/22/2018 Name Date Tdap 12/6/2014 Not reviewed this visit You Were Diagnosed With   
  
 Codes Comments Elevated PSA    -  Primary ICD-10-CM: R97.20 ICD-9-CM: 790.93 Vitals BP Pulse Height(growth percentile) Weight(growth percentile) SpO2 BMI  
 139/78 (BP 1 Location: Right arm, BP Patient Position: Sitting) 95 5' 10\" (1.778 m) 223 lb (101.2 kg) 94% 32 kg/m2 Smoking Status Never Smoker Vitals History BMI and BSA Data Body Mass Index Body Surface Area 32 kg/m 2 2.24 m 2 Preferred Pharmacy Pharmacy Name Phone [facility] [address] [place] [phone] Your Updated Medication List  
  
   
This list is accurate as of 8/15/18  3:46 PM.  Always use your most recent med list.  
  
  
  
  
 acetaminophen 500 mg tablet Commonly known as:  TYLENOL Take 1,000 mg by mouth every six (6) hours as needed for Pain. azelastine 137 mcg (0.1 %) nasal spray Commonly known as:  ASTELIN  
1 spray up each nostril twice per day  
  
 butalbital-acetaminophen-caff -40 mg per capsule Commonly known as:  [employer] Take 2 capsules every 8 hours as needed for headache  
  
 celecoxib 200 mg capsule Commonly known as:  CELEBREX Take 1 Cap by mouth two (2) times a day. CIPRO 500 mg tablet Generic drug:  ciprofloxacin HCl Take  by mouth two (2) times a day. clindamycin 300 mg capsule Commonly known as:  CLEOCIN  
2 po one hour prior to appt  
  
 clotrimazole-betamethasone topical cream  
Commonly known as:  Lawanda Hayden  
 Apply twice per day  
  
 diclofenac 1 % Gel Commonly known as:  VOLTAREN Apply 4 g to affected area four (4) times daily. Maximum 16 grams per joint per day. Dispense 5 100 gram tubes HYDROcodone-acetaminophen  mg tablet Commonly known as:  Harmony Oyster Take 1 Tab by mouth every eight (8) hours as needed for Pain. Max Daily Amount: 3 Tabs. labetalol 100 mg tablet Commonly known as:  NORMODYNE  
TAKE ONE TABLET BY MOUTH TWICE DAILY  
  
 lansoprazole 30 mg capsule Commonly known as:  PREVACID TAKE 1 CAPSULE DAILY BEFOREBREAKFAST  
  
 lisinopril-hydroCHLOROthiazide 20-12.5 mg per tablet Commonly known as:  PRINZIDE, ZESTORETIC  
TAKE 1 TABLET BY MOUTH DAILY  
  
 metaxalone 800 mg tablet Commonly known as:  SKELAXIN Take 1 Tab by mouth three (3) times daily. Indications: Muscle Spasm  
  
 montelukast 10 mg tablet Commonly known as:  SINGULAIR  
TAKE 1 TABLET BY MOUTH EVERY DAY  
  
 nystatin topical cream  
Commonly known as:  MYCOSTATIN  
APPLY EXTERNALLY TO THE AFFECTED AREA TWICE DAILY  
  
 raNITIdine 150 mg tablet Commonly known as:  ZANTAC Take 150 mg by mouth nightly. tamsulosin 0.4 mg capsule Commonly known as:  FLOMAX TAKE 1 CAPSULE BY MOUTH DAILY * warfarin 3 mg tablet Commonly known as:  COUMADIN Or as directed * warfarin 5 mg tablet Commonly known as:  COUMADIN  
TAKE 2 AND 1/2 TABLETS BY MOUTH DAILY OR AS DIRECTED * Notice: This list has 2 medication(s) that are the same as other medications prescribed for you. Read the directions carefully, and ask your doctor or other care provider to review them with you. We Performed the Following AMB POC URINALYSIS DIP STICK AUTO W/O MICRO [10503 CPT(R)] Patient Instructions Prostate Biopsy and Ultrasound: About This Test 
What is it?  
 
A prostate biopsy is a type of test. Your doctor takes small tissue samples from your prostate gland. Then another doctor looks at the tissue under a microscope to see if there are cancer cells. This test is done by a doctor who specializes in men's genital and urinary problems (urologist). It can be done in your doctor's office, a day surgery clinic, or a hospital operating room. To get the tissue samples from the prostate, the doctor inserts a thin needle through the rectum, the urethra, or the area between the anus and scrotum (perineum). The most common method is through the rectum. Your doctor may use ultrasound to help guide the needle. Why is this test done? You may need a prostate biopsy if your doctor found something of concern during a digital rectal exam. Or you may need it if a blood test showed a high level of prostate-specific antigen (PSA). A biopsy can help find out if you have prostate cancer. How can you prepare for this test? 
Before you have a prostate biopsy, tell your doctor if: 
· You are taking any medicines or using any herbal supplements. · You are allergic to any medicines. · You have had bleeding problems, or you take aspirin or some other blood thinner. What happens before the test? 
· You may need to have an enema before the test. 
· You will need to take off all or most of your clothes. You will be given a cloth or paper gown to use during the test. 
· You may be given a sedative through a vein (IV) in your arm. The sedative will help you relax and stay still. What happens during the test? 
Through the rectum · You may be asked to kneel, lie on your side, or lie on your back. · Your doctor may inject an anesthetic around the prostate to numb the area before the sample is taken. · Ultrasound is often used to guide the needle to the correct spot. · Your doctor may choose to use a needle guide for the biopsy. He or she will attach the guide to a finger. Your doctor will insert the finger into your rectum. · The needle will enter the prostate and take 6 to 12 samples. Through the urethra · You will lie on your back. Your feet will rest in stirrups. · You will get anesthesia. The anesthesia may make you sleep. Or it may just numb the area being worked on. 
· Your doctor will insert a lighted scope (cystoscope) into your urethra. The scope allows your doctor to look directly at the prostate. Your doctor will pass a cutting loop through the scope to remove samples of prostate tissue. Through the perineum · You will lie on an exam table either on your side or on your back with your knees bent. You will get anesthesia. The anesthesia may make you sleep. Or it may just numb the area being worked on. 
· Your doctor will make a small cut in your perineum. Then he or she will insert a finger into the rectum to hold the prostate. · Your doctor will then insert the needle through the cut and into the prostate. · The needle collects samples of tissue and is then pulled out. What else should you know about this test? 
· A prostate biopsy has a slight risk of causing problems such as infection or bleeding. · If the biopsy went through your rectum, you may have a small amount of bleeding from your rectum for 2 to 3 days after the biopsy. · You may have a little pain in your pelvic area. You may also have a little blood in your urine for 1 to 5 days. · You may have some blood in your semen for a week or longer. How long will the test take? · This test will take about 15 to 45 minutes. What happens after the test? 
Your doctor will tell you what to expect and watch for after your test. In general: · If you have anesthesia that makes you sleep, you will be in a recovery room for a few hours. You will need someone to drive you home. You may feel tired for the rest of the day. · You will probably be able to go home right away.  
· If your doctor had you stop taking any medicine for the biopsy, ask him or her when you can start taking it again. If you were given antibiotics, take them as directed. · Do not do heavy work or exercise for 4 hours after the test. 
· Your doctor will tell you how long it may take to get your results back. Follow-up care is a key part of your treatment and safety. Be sure to make and go to all appointments, and call your doctor if you are having problems. It's also a good idea to keep a list of the medicines you take. Ask your doctor when you can expect to have your test results. Where can you learn more? Go to http://rivka-jarrell.info/. Enter Y504 in the search box to learn more about \"Prostate Biopsy and Ultrasound: About This Test.\" Current as of: May 12, 2017 Content Version: 11.7 © 0526-2427 [employer]. Care instructions adapted under license by [employer] (which disclaims liability or warranty for this information). If you have questions about a medical condition or this instruction, always ask your healthcare professional. [address] any warranty or liability for your use of this information. Please provide this summary of care documentation to your next provider. Your primary care clinician is listed as Moni Kyle. If you have any questions after today's visit, please call [phone].

## 2018-08-15 NOTE — PROGRESS NOTES
Johnna Bass 70 y.o. male     . Sukumar Sears seen today for prostate biopsy assessing elevated PSA     patient has history of symptomatic BPH responding favorably to alpha-blocker therapy with Flomax   Family history significant for prostate carcinoma developing in father at age 61 and older brother developing prostate cancer at age 54     PSA 5.3 in May 2018           transperineal prostate biopsy on 15 August 2018 prostate volume 35 cc PSA density 0.15     Review of Systems:   CNS: No seizure syncope headaches dizziness or visual changes  Respiratory: Chronic cough-no chest pain no wheezing - no shortness of breath  Cardiovascular: No angina no palpitations-DVT on Coumadin anticoagulation   intestinal: Dyspepsia diarrhea or constipation urinary: Irritative and obstructive voiding symptoms responding favorably to alpha-blocker therapy  Skeletal: Chronic low back pain  Endocrine: No diabetes or thyroid disease  Other:  Allergies: Allergies   Allergen Reactions    Amoxicillin Itching    Augmentin [Amoxicillin-Pot Clavulanate] Itching    Hibiclens [Chlorhexidine Gluconate] Itching    Penicillins Rash    Requip [Ropinirole] Nausea and Vomiting      Medications:    Current Outpatient Prescriptions   Medication Sig Dispense Refill    ciprofloxacin HCl (CIPRO) 500 mg tablet Take  by mouth two (2) times a day.  lansoprazole (PREVACID) 30 mg capsule TAKE 1 CAPSULE DAILY BEFOREBREAKFAST 90 Cap 3    tamsulosin (FLOMAX) 0.4 mg capsule TAKE 1 CAPSULE BY MOUTH DAILY 30 Cap 0    celecoxib (CELEBREX) 200 mg capsule Take 1 Cap by mouth two (2) times a day. 180 Cap 0    clindamycin (CLEOCIN) 300 mg capsule 2 po one hour prior to appt 6 Cap 1    lisinopril-hydroCHLOROthiazide (PRINZIDE, ZESTORETIC) 20-12.5 mg per tablet TAKE 1 TABLET BY MOUTH DAILY 90 Tab 0    diclofenac (VOLTAREN) 1 % gel Apply 4 g to affected area four (4) times daily. Maximum 16 grams per joint per day.  Dispense 5 100 gram tubes 5 Each 0    clotrimazole-betamethasone (LOTRISONE) topical cream Apply twice per day 45 g 1    raNITIdine (ZANTAC) 150 mg tablet Take 150 mg by mouth nightly. 5    labetalol (NORMODYNE) 100 mg tablet TAKE ONE TABLET BY MOUTH TWICE DAILY (Patient taking differently: TAKE ONE TABLET BY MOUTH DAILY) 180 Tab 0    metaxalone (SKELAXIN) 800 mg tablet Take 1 Tab by mouth three (3) times daily. Indications: Muscle Spasm (Patient taking differently: Take 800 mg by mouth three (3) times daily as needed. Indications: Muscle Spasm) 90 Tab 2    montelukast (SINGULAIR) 10 mg tablet TAKE 1 TABLET BY MOUTH EVERY DAY (Patient taking differently: TAKE 1 TABLET BY MOUTH EVERY HS) 90 Tab 0    acetaminophen (TYLENOL) 500 mg tablet Take 1,000 mg by mouth every six (6) hours as needed for Pain.  warfarin (COUMADIN) 5 mg tablet TAKE 2 AND 1/2 TABLETS BY MOUTH DAILY OR AS DIRECTED 75 Tab 0    butalbital-acetaminophen-caff (FIORICET) -40 mg per capsule Take 2 capsules every 8 hours as needed for headache 126 Cap 1    nystatin (MYCOSTATIN) topical cream APPLY EXTERNALLY TO THE AFFECTED AREA TWICE DAILY 15 g 0    warfarin (COUMADIN) 3 mg tablet Or as directed 30 Tab 3    HYDROcodone-acetaminophen (NORCO)  mg tablet Take 1 Tab by mouth every eight (8) hours as needed for Pain. Max Daily Amount: 3 Tabs. 40 Tab 0    azelastine (ASTELIN) 137 mcg (0.1 %) nasal spray 1 spray up each nostril twice per day (Patient taking differently: 1 Spray by Both Nostrils route daily.  Using once per day) 1 Bottle 1       Past Medical History:   Diagnosis Date    Arthritis     Bleeding     Blood clot in the legs     Chronic pain     knee and shoulder    Esophageal reflux     GERD (gastroesophageal reflux disease)     Headache(784.0)     migraine    High cholesterol     Hypertension     Lower back pain 11/6/2010    Other chest pain     Personal history of venous thrombosis and embolism     Pure hypercholesterolemia     Right buttock pain 11/6/2010    Right foot pain     Rotator cuff tear     left-since 2010, worsened tear Young.    Spinal stenosis     Tendonitis, tibialis     anterior    Thromboembolus ([address]     3 after sx last one 2000      Past Surgical History:   Procedure Laterality Date    FOOT/TOES SURGERY PROC UNLISTED      HX BACK SURGERY      HX ORTHOPAEDIC  06-25-12    Right foot with excision of bursa and adipose tissue from fifth metatarsal base by Dr. Mary Martin      lower back (1992 and 2000)    HX OTHER SURGICAL      left foot (2008)    LAMINOTOMY      NERVE BLOCK       Social History     Social History    Marital status:      Spouse name: N/A    Number of children: N/A    Years of education: N/A     Occupational History    Not on file.      Social History Main Topics    Smoking status: Never Smoker    Smokeless tobacco: Never Used    Alcohol use No    Drug use: No    Sexual activity: Not Currently     Other Topics Concern    Not on file     Social History Narrative      Family History   Problem Relation Age of Onset   Clove.Goldberg Arthritis-rheumatoid Mother     Dementia Mother     Heart Disease Father     Cancer Father     Hypertension Other       Urinalysis negative dipstick/nitrite negative                        PROSTATE BIOPSY REPORT    Patient ID confirmed prior to procedure: Yes    Idications for prostate biopsy: PSA 5.3    Pre biopsy preparations:         ___X______Cipro 500 mg PO                                       Coumadin anticoagulation discontinued on 10 August 2018       ________ Levaquin 500 mg PO                                                   ________ Ancef 1000mg PO      _________Valium 10 mg PO      TRANSRECTAL ULTRASOUND IMAGING OF THE PROSTATE:    Multiple transverse and longitudinal images of the prostate, seminal vesicles, and bladder revealed: Homogeneous echo pattern and outlines bilaterally with 5 x 10 mm calcification in the posterior left lobe-no cysts no hypodense segments evident on either side    Prostate Dimensions: 4.4 cm x 3.7 cm x 4.3 cm    Prostate Volume = 0.5(H)(L)(W) =    35.0  cc    PSA Density: (PSA)/Prostate Volume = 5.3/35.0  =  PSAD  = 0.15                       Biopsy Procedure    Local anesthesia of the perineum was obtained by injecting 1% Lidocaine into the skin of the perineum 1 cm anterior to the anal verge and then into the deep perineum in the direction of the prostate. Guided by a gloved finger in the rectum, a 14 gauge needle was then passed into the anesthetized region and advanced in the direction of the prostate. An 18 gauge spinal needle was then passed through the 14 gauge needle and 10 cc's of 1% Lidocaine was infiltrated periprostatically with needle placement guided by ultrasound imaging obtained by transrectal positioning of the ultrasound transducer. An 18 gauge Biopty needle was then passed through the 14 gauge conduit needle multiple times obtaining  6   cores of tissue from the left lobe and   6   cores of tissue from the right lobe, biopsy needle placement guided by real time transrectal ultrasound imaging of the needle along its path through the perineum into the prostate. The conduit needle was then removed and pressure was applied to the perineum for one minute. The patient was then given oral and written instructions regarding post biopsy precautions as well as instructions regarding activity, medication, and follow up.     Comments: Procedure was uncomplicated well-tolerated    EBL: Minimal  Specimen removed: 6 cores of tissue in the right lobe/6 cores of tissue from the left lobe          Impression: Elevated PSA with history of symptomatic BPH stable status post transperineal prostate biopsy under Coumadin anticoagulation hiatus        Plan: Cipro 500 mg twice daily ×3 days    Okay to resume Coumadin anticoagulation tomorrow if voided urine is clear    RTC 1 week for pathology report            Oli Casiano MD  -electronically signed-    PLEASE NOTE:  This document has been produced using voice recognition software. Unrecognized errors in transcription may be present.

## 2018-08-20 ENCOUNTER — OFFICE VISIT (OUTPATIENT)
Dept: ORTHOPEDIC SURGERY | Age: 63
End: 2018-08-20

## 2018-08-20 VITALS
DIASTOLIC BLOOD PRESSURE: 78 MMHG | HEART RATE: 81 BPM | WEIGHT: 226.4 LBS | TEMPERATURE: 98.4 F | OXYGEN SATURATION: 97 % | SYSTOLIC BLOOD PRESSURE: 140 MMHG | BODY MASS INDEX: 32.41 KG/M2 | HEIGHT: 70 IN | RESPIRATION RATE: 17 BRPM

## 2018-08-20 DIAGNOSIS — M47.816 FACET ARTHROPATHY, LUMBAR: ICD-10-CM

## 2018-08-20 DIAGNOSIS — S33.8XXA SACRUM SPRAIN, INITIAL ENCOUNTER: ICD-10-CM

## 2018-08-20 DIAGNOSIS — M51.26 DISPLACEMENT OF LUMBAR INTERVERTEBRAL DISC WITHOUT MYELOPATHY: Primary | ICD-10-CM

## 2018-08-20 DIAGNOSIS — Z79.01 CHRONIC ANTICOAGULATION: ICD-10-CM

## 2018-08-20 DIAGNOSIS — M51.27 LUMBAGO-SCIATICA DUE TO DISPLACEMENT OF LUMBAR INTERVERTEBRAL DISC: ICD-10-CM

## 2018-08-20 NOTE — PROGRESS NOTES
[facility]  [address] [place], [facility] [address]  Phone: [phone]  Fax: [phone]    Pt's YOB: 1955    ASSESSMENT   Diagnoses and all orders for this visit:    1. Displacement of lumbar intervertebral disc without myelopathy    2. Lumbago-sciatica due to displacement of lumbar intervertebral disc    3. Sacrum sprain, initial encounter    4. Facet arthropathy, lumbar ([address]    5. Chronic anticoagulation         IMPRESSION AND PLAN:  Bret Borrero is a 70 y.o. male with history of cervical and lumbar pain. He notes that he experiences intermittent hip pain and reports that he is doing well with his lower back pain at this time. Pt uses Voltaren 1% gel on the left knee with benefit. 1) Pt was given information on cervical and lumbar arthritis exercises. 2) He is not a candidate for NSAID's due to anticoagulation with Coumadin. 3) Pt will continue taking Skelaxin 800 mg as prescribed and does not need a refill at this time. 4) Mr. Ady Lopez has a reminder for a \"due or due soon\" health maintenance. I have asked that he contact his primary care provider, Mike Gipson MD, for follow-up on this health maintenance. 5)  demonstrated consistency with prescribing. 6) Pt will follow-up in 4 months or sooner if needed. HISTORY OF PRESENT ILLNESS:  Bret Borrero is a 70 y.o. male with history of cervical and lumbar pain. He notes that he experiences intermittent hip pain. He has a torn rotator cuff on the left which interferes with sleep. Pt notes that his lower back pain is doing well at this time. He complains of right knee pain and had a prior left knee replacement. Pt had a biopsy of his prostate and will get the results of the test back on Wednesday. He notes that his PSA was elevated at 5.25. Pt states that he will have right eye surgery on 09/11/2018 since he is experiencing issues with his retina.  He is currently on Coumadin. Pt uses Voltaren 1% gel TID on the left knee and does not need a refill of Skelaxin 800 mg at this time. Pt at this time desires to continue with current care. Pain Scale: 0 - No pain/10    PCP: Cony Hodges MD     Past Medical History:   Diagnosis Date    Arthritis     Bleeding     Blood clot in the legs     Chronic pain     knee and shoulder    Esophageal reflux     GERD (gastroesophageal reflux disease)     Headache(784.0)     migraine    High cholesterol     Hypertension     Lower back pain 11/6/2010    Other chest pain     Personal history of venous thrombosis and embolism     Pure hypercholesterolemia     Right buttock pain 11/6/2010    Right foot pain     Rotator cuff tear     left-since 2010, worsened tear Jan.    Spinal stenosis     Tendonitis, tibialis     anterior    Thromboembolus ([address]     3 after sx last one 2000        Social History     Social History    Marital status:      Spouse name: N/A    Number of children: N/A    Years of education: N/A     Occupational History    Not on file. Social History Main Topics    Smoking status: Never Smoker    Smokeless tobacco: Never Used    Alcohol use No    Drug use: No    Sexual activity: Not Currently     Other Topics Concern    Not on file     Social History Narrative       Current Outpatient Prescriptions   Medication Sig Dispense Refill    lansoprazole (PREVACID) 30 mg capsule TAKE 1 CAPSULE DAILY BEFOREBREAKFAST 90 Cap 3    warfarin (COUMADIN) 5 mg tablet TAKE 2 AND 1/2 TABLETS BY MOUTH DAILY OR AS DIRECTED 75 Tab 0    tamsulosin (FLOMAX) 0.4 mg capsule TAKE 1 CAPSULE BY MOUTH DAILY 30 Cap 0    celecoxib (CELEBREX) 200 mg capsule Take 1 Cap by mouth two (2) times a day. (Patient taking differently: Take 200 mg by mouth daily. ) 180 Cap 0    butalbital-acetaminophen-caff (FIORICET) -40 mg per capsule Take 2 capsules every 8 hours as needed for headache 126 Cap 1    lisinopril-hydroCHLOROthiazide (PRINZIDE, ZESTORETIC) 20-12.5 mg per tablet TAKE 1 TABLET BY MOUTH DAILY 90 Tab 0    diclofenac (VOLTAREN) 1 % gel Apply 4 g to affected area four (4) times daily. Maximum 16 grams per joint per day. Dispense 5 100 gram tubes 5 Each 0    clotrimazole-betamethasone (LOTRISONE) topical cream Apply twice per day 45 g 1    nystatin (MYCOSTATIN) topical cream APPLY EXTERNALLY TO THE AFFECTED AREA TWICE DAILY 15 g 0    warfarin (COUMADIN) 3 mg tablet Or as directed 30 Tab 3    raNITIdine (ZANTAC) 150 mg tablet Take 150 mg by mouth nightly. 5    labetalol (NORMODYNE) 100 mg tablet TAKE ONE TABLET BY MOUTH TWICE DAILY (Patient taking differently: TAKE ONE TABLET BY MOUTH DAILY) 180 Tab 0    azelastine (ASTELIN) 137 mcg (0.1 %) nasal spray 1 spray up each nostril twice per day (Patient taking differently: 1 Spray by Both Nostrils route daily. Using once per day) 1 Bottle 1    metaxalone (SKELAXIN) 800 mg tablet Take 1 Tab by mouth three (3) times daily. Indications: Muscle Spasm (Patient taking differently: Take 800 mg by mouth three (3) times daily as needed. Indications: Muscle Spasm) 90 Tab 2    montelukast (SINGULAIR) 10 mg tablet TAKE 1 TABLET BY MOUTH EVERY DAY (Patient taking differently: TAKE 1 TABLET BY MOUTH EVERY HS) 90 Tab 0    acetaminophen (TYLENOL) 500 mg tablet Take 1,000 mg by mouth every six (6) hours as needed for Pain.  clindamycin (CLEOCIN) 300 mg capsule 2 po one hour prior to appt 6 Cap 1    HYDROcodone-acetaminophen (NORCO)  mg tablet Take 1 Tab by mouth every eight (8) hours as needed for Pain. Max Daily Amount: 3 Tabs. 40 Tab 0       Allergies   Allergen Reactions    Amoxicillin Itching    Augmentin [Amoxicillin-Pot Clavulanate] Itching    Hibiclens [Chlorhexidine Gluconate] Itching    Penicillins Rash    Requip [Ropinirole] Nausea and Vomiting         REVIEW OF SYSTEMS    Constitutional: Negative for fever, chills, or weight change. Respiratory: Negative for cough or shortness of breath. Cardiovascular: Negative for chest pain or palpitations. Gastrointestinal: Negative for acid reflux, change in bowel habits, or constipation. Genitourinary: Negative for dysuria and flank pain. Musculoskeletal: Positive for lumbar pain. Skin: Negative for rash. Neurological: Negative for headaches, dizziness, or numbness. Endo/Heme/Allergies: Negative for increased bruising. Psychiatric/Behavioral: Negative for difficulty with sleep. PHYSICAL EXAMINATION  Visit Vitals    /78    Pulse 81    Temp 98.4 °F (36.9 °C) (Oral)    Resp 17    Ht 5' 10\" (1.778 m)    Wt 226 lb 6.4 oz (102.7 kg)    SpO2 97%    BMI 32.49 kg/m2       Constitutional: Awake, alert, and in no acute distress. Neurological: 1+ symmetrical DTRs in the upper extremities. 1+ symmetrical DTRs in the lower extremities. Sensation to light touch is intact. Negative Eliecer's sign bilaterally. Skin: warm, dry, and intact. Musculoskeletal: Tenderness to palpation in the lower lumbar region. Moderate pain with extension and axial loading. No pain with internal or external rotation of his hips. Negative straight leg raise bilaterally. Hip Flex  Quads Hamstrings Ankle DF EHL Ankle PF   Right +4/5 +4/5 +4/5 +4/5 +4/5 +4/5   Left +4/5 +4/5 +4/5 +4/5 +4/5 +4/5     IMAGING:    Lumbar spine MRI from 01/24/2018 was personally reviewed with the patient and demonstrated:  Results from [facility] on 01/24/18   MRI LUMB SPINE W WO CONT     Narrative MR lumbar spine with and without contrast    CPT CODE: 87466    HISTORY: Chronic and worsening low back pain with left lower extremity pain. Increasing weakness. Remote history of surgeries. No recent injury. COMPARISON: MRI January 2013. TECHNIQUE: Lumbar spine scanned with axial and sagittal T1W scans, axial and  sagittal T2W scans, and with post gadolinium axial and sagittal T1W scans.   Contrast used: 10 cc Gadavist.    FINDINGS:     Prior laminotomies on the left at L4-5 and bilaterally at L5-S1. No fracture,  bone destruction, or fluid collection. Intact lordosis. Normal vertebral body heights. Small less than 5 mm low signal  focus within anterior L4, developed since 2013. No similar focus elsewhere. No  aggressive features. Otherwise generally fatty marrow signal with some chronic  fatty endplate changes straddling L5-S1. Moderate to severe disc space narrowing  and disc desiccation of that level. Mild to moderate narrowing and desiccation  of L3-4 and L4-5. Conus at T12-L1. Axial imaging correlation:    T12-L1:  Patent canal and foramina. L1-L2: Patent canal and foramina. L2-L3: Patent canal and foramina. L3-L4: Broad-based disc osteophyte complex. Bilateral facet arthropathy with  ligamentum flavum thickening and buckling. Moderate concentric spinal stenosis. Particular narrowing of lateral recesses with transient distortion of the  crossing L4 nerves. Axial T2 image 20. Patent foramina.  Progression of  degenerative findings. L4-L5: Postoperative level. Mild broad-based disc osteophyte complex with a  small amount of enhancing scar tissue. Hypertrophic facet arthropathy. Moderate  spinal stenosis. Narrowing of the lateral recesses left more than right. Transient distortion of the crossing nerves particularly left L5. Axial T2 image  13. Mild foraminal stenoses.  Unchanged. L5-S1: Postoperative level. Broad-based disc osteophyte complex with enhancing  scar tissue centrally. Hypertrophic facet arthropathy. No significant spinal  stenosis. Moderately severe bilateral foraminal stenoses. Unchanged. Incidental imaging of regional soft tissues unremarkable.                  Impression IMPRESSION:    1. Some progression of degenerative disc and facet disease at L3-4, with  moderate spinal stenosis and potentially impingement of the crossing L4 nerves,  left more than right.   2. Stable postsurgical and degenerative findings at L4-5 and L5-S1.           Left tib/fib x-rays from 01/19/2018 demonstrated:  Results from Hospital Encounter on 01/19/18   XR TIB/FIB LT     Narrative LEFT TIBIA/FIBULA, TWO VIEWS    CPT CODE: [id number]    INDICATION: Left lower leg pain x1 week, no known trauma    COMPARISON: None    TECHNIQUE: AP and lateral radiographs of the left tibia and fibula are reviewed.  ]     FINDINGS:    There is no evidence of acute osseous injury.  No radiographic evidence of  significant localized soft tissue swelling is seen. Mild degenerative changes at the knee with mild joint space narrowing best seen  at the medial compartment and very small periarticular osteophytosis best seen  along the posterior superior patella. Chondrocalcinosis at the knee, which is not specific but often seen in the  setting of CPPD. Tiny calcific appearing densities project  inferior to the patella on lateral  view, overlying the soft tissues medial to the distal tibia on AP image, and  anterior to the proximal to mid tibia on lateral projection, nonspecific.              Impression Impression:     No evidence of acute fracture. Chondrocalcinosis. Mild degenerative changes at the left knee. Few small scattered soft tissue calcifications noted.                 Bilateral knee x-rays from 03/14/2017 demonstrated:  Medial compartment narrowing bilaterally, L>R      Cervical Spine MRI from 12/19/2016 demonstrated:  Results from Hospital Encounter on 12/19/16   MRI CERV SPINE WO CONT     Narrative MRI of cervical spine without contrast    HISTORY: Chronic progressive neck pain with headaches. COMPARISON: No prior MRI. Cervical spine radiographs from 12/1/2016. TECHNIQUE: T1 weighted, T2 FSE, FSE inversion recovery sagittal images are  supplemented by T2 weighted and GRE/medic axial images. FINDINGS: Normal alignment and vertebral body heights.  Normal marrow signal  except for mild endplate degenerative change. Cervical cord is normal in signal  and caliber. Visualized posterior fossa contents look normal. Paraspinal soft  tissues look normal.    C2-3: No significant degenerative disc disease or spinal stenosis. C3-4: Small nonfocal disc protrusion which contacts the ventral cord and causes  borderline spinal stenosis. No foraminal stenosis. C4-5: No significant degenerative disc disease. Mildly hypertrophic facets. No  spinal stenosis. C5-6: Disc is narrowed with diffusely bulging disc and osteophyte complex. Facets are mildly hypertrophic. Mild spinal canal and moderate left foraminal  stenoses. C6-7: Disc is narrowed with diffusely bulging disc and osteophyte complex. Facets are mildly hypertrophic. Mild spinal canal and moderate bilateral  foraminal stenoses. C7-T1: No significant degenerative disc disease or spinal stenosis.               Impression Impression:    Disc osteophyte complexes causing mild spinal canal stenoses at C5-6 and C6-7. Moderate left foraminal stenosis at C5-6 and moderate bilateral foraminal  stenoses at C6-7.        Written by Clari Vincent, as dictated by Reese Farooq MD.  I, Dr. Reese Farooq confirm that all documentation is accurate.

## 2018-08-20 NOTE — MR AVS SNAPSHOT
[address] [facility] [address] [facility] [address] 
[phone] Patient: Mckenna Oreilly MRN: [id number] PYQ:9/78/1551 Visit Information Date & Time Provider Department Dept. Phone Encounter #  
 8/20/2018  3:30 PM Roscoe Delgado MD [address] [facility] [id number] Follow-up Instructions Return in about 4 months (around 12/20/2018). Your Appointments 8/22/2018  3:30 PM  
Office Visit with Gladys Bryant MD  
[facility] Appt Note: Bx results [address] [address] [phone] [address] [address]  
  
    
 8/30/2018  3:45 PM  
PRE OP with Toni Denver, MD  
[facility] Appt Note: 3 mo f/u; Eye Surgery [address] [facility], [address] [place], [place] [phone]  
  
   
 [address] [facility], [address] [facility] [address]  
  
    
 10/11/2018  8:30 AM  
Follow Up with Dru Oleary PA-C  
[facility]) Appt Note: 3 M FU LT KNEE  
 [address] [facility], [address] [facility] [id number] [phone]  
  
   
 [address] [facility], [address] [facility] [address] Upcoming Health Maintenance Date Due Hepatitis C Screening 1955 ZOSTER VACCINE AGE 60> 2/13/2013 GLAUCOMA SCREENING Q2Y 4/13/2018 Pneumococcal 65+ Low/Medium Risk (1 of 2 - PCV13) 4/13/2018 MEDICARE YEARLY EXAM 5/2/2018 Influenza Age 5 to Adult 8/1/2018 DTaP/Tdap/Td series (2 - Td) 12/6/2024 COLONOSCOPY 5/27/2026 Allergies as of 8/20/2018  Review Complete On: 8/20/2018 By: Corby Coffman LPN Severity Noted Reaction Type Reactions Amoxicillin  07/03/2018    Itching Augmentin [Amoxicillin-pot Clavulanate]    Itching Hibiclens [Chlorhexidine Gluconate]  03/16/2018    Itching Penicillins    Rash Requip [Ropinirole]  05/30/2018    Nausea and Vomiting Current Immunizations  Reviewed on 6/22/2018 Name Date Tdap 12/6/2014 Not reviewed this visit You Were Diagnosed With   
  
 Codes Comments Displacement of lumbar intervertebral disc without myelopathy    -  Primary ICD-10-CM: M51.26 
ICD-9-CM: 722.10 Lumbago-sciatica due to displacement of lumbar intervertebral disc     ICD-10-CM: M51.27 ICD-9-CM: 722.10 Sacrum sprain, initial encounter     ICD-10-CM: S33. Gwendel Heady ICD-9-CM: 847.3 Facet arthropathy, lumbar ([facility] [address]     ICD-10-CM: M46.96 
ICD-9-CM: 672. 3 Chronic anticoagulation     ICD-10-CM: Z79.01 
ICD-9-CM: V58.61 Vitals BP Pulse Temp Resp Height(growth percentile) Weight(growth percentile) 140/78 81 98.4 °F (36.9 °C) (Oral) 17 5' 10\" (1.778 m) 226 lb 6.4 oz (102.7 kg) SpO2 BMI Smoking Status 97% 32.49 kg/m2 Never Smoker Vitals History BMI and BSA Data Body Mass Index Body Surface Area  
 32.49 kg/m 2 2.25 m 2 Preferred Pharmacy Pharmacy Name Phone [facility] [address] [place] [address] [phone] Your Updated Medication List  
  
   
This list is accurate as of 8/20/18  4:16 PM.  Always use your most recent med list.  
  
  
  
  
 acetaminophen 500 mg tablet Commonly known as:  TYLENOL Take 1,000 mg by mouth every six (6) hours as needed for Pain. azelastine 137 mcg (0.1 %) nasal spray Commonly known as:  ASTELIN  
1 spray up each nostril twice per day  
  
 butalbital-acetaminophen-caff -40 mg per capsule Commonly known as:  [employer] Take 2 capsules every 8 hours as needed for headache  
  
 celecoxib 200 mg capsule Commonly known as:  CELEBREX Take 1 Cap by mouth two (2) times a day. clindamycin 300 mg capsule Commonly known as:  CLEOCIN  
2 po one hour prior to appt  
  
 clotrimazole-betamethasone topical cream  
Commonly known as:  Lambert Cotton Apply twice per day  
  
 diclofenac 1 % Gel Commonly known as:  VOLTAREN Apply 4 g to affected area four (4) times daily. Maximum 16 grams per joint per day. Dispense 5 100 gram tubes HYDROcodone-acetaminophen  mg tablet Commonly known as:  Miramontes Minor Take 1 Tab by mouth every eight (8) hours as needed for Pain. Max Daily Amount: 3 Tabs. labetalol 100 mg tablet Commonly known as:  NORMODYNE  
TAKE ONE TABLET BY MOUTH TWICE DAILY  
  
 lansoprazole 30 mg capsule Commonly known as:  PREVACID TAKE 1 CAPSULE DAILY BEFOREBREAKFAST  
  
 lisinopril-hydroCHLOROthiazide 20-12.5 mg per tablet Commonly known as:  PRINZIDE, ZESTORETIC  
TAKE 1 TABLET BY MOUTH DAILY  
  
 metaxalone 800 mg tablet Commonly known as:  SKELAXIN Take 1 Tab by mouth three (3) times daily. Indications: Muscle Spasm  
  
 montelukast 10 mg tablet Commonly known as:  SINGULAIR  
TAKE 1 TABLET BY MOUTH EVERY DAY  
  
 nystatin topical cream  
Commonly known as:  MYCOSTATIN  
APPLY EXTERNALLY TO THE AFFECTED AREA TWICE DAILY  
  
 raNITIdine 150 mg tablet Commonly known as:  ZANTAC Take 150 mg by mouth nightly. tamsulosin 0.4 mg capsule Commonly known as:  FLOMAX TAKE 1 CAPSULE BY MOUTH DAILY * warfarin 3 mg tablet Commonly known as:  COUMADIN Or as directed * warfarin 5 mg tablet Commonly known as:  COUMADIN  
TAKE 2 AND 1/2 TABLETS BY MOUTH DAILY OR AS DIRECTED * Notice: This list has 2 medication(s) that are the same as other medications prescribed for you. Read the directions carefully, and ask your doctor or other care provider to review them with you. Follow-up Instructions Return in about 4 months (around 12/20/2018). Patient Instructions Neck Arthritis: Exercises Your Care Instructions Here are some examples of typical rehabilitation exercises for your condition. Start each exercise slowly. Ease off the exercise if you start to have pain. Your doctor or physical therapist will tell you when you can start these exercises and which ones will work best for you. How to do the exercises Neck stretches to the side 1. This stretch works best if you keep your shoulder down as you lean away from it. To help you remember to do this, start by relaxing your shoulders and lightly holding on to your thighs or your chair. 2. Tilt your head toward your shoulder and hold for 15 to 30 seconds. Let the weight of your head stretch your muscles. 3. Repeat 2 to 4 times toward each shoulder. Chin tuck 1. Lie on the floor with a rolled-up towel under your neck. Your head should be touching the floor. 2. Slowly bring your chin toward your chest. 
3. Hold for a count of 6, and then relax for up to 10 seconds. 4. Repeat 8 to 12 times. Active cervical rotation 1. Sit in a firm chair, or stand up straight. 2. Keeping your chin level, turn your head to the right, and hold for 15 to 30 seconds. 3. Turn your head to the left and hold for 15 to 30 seconds. 4. Repeat 2 to 4 times to each side. Shoulder blade squeeze 1. While standing, squeeze your shoulder blades together. 2. Do not raise your shoulders up as you are squeezing. 3. Hold for 6 seconds. 4. Repeat 8 to 12 times. Shoulder rolls 1. Sit comfortably with your feet shoulder-width apart. You can also do this exercise standing up. 2. Roll your shoulders up, then back, and then down in a smooth, circular motion. 3. Repeat 2 to 4 times. Follow-up care is a key part of your treatment and safety. Be sure to make and go to all appointments, and call your doctor if you are having problems. It's also a good idea to know your test results and keep a list of the medicines you take. Where can you learn more? Go to http://rivka-jarrell.info/. Enter E980 in the search box to learn more about \"Neck Arthritis: Exercises. \" Current as of: November 29, 2017 Content Version: 11.7 © 2915-5763 [employer]. Care instructions adapted under license by [employer] (which disclaims liability or warranty for this information). If you have questions about a medical condition or this instruction, always ask your healthcare professional. [place] [address] any warranty or liability for your use of this information. Low Back Arthritis: Exercises Your Care Instructions Here are some examples of typical rehabilitation exercises for your condition. Start each exercise slowly. Ease off the exercise if you start to have pain. Your doctor or physical therapist will tell you when you can start these exercises and which ones will work best for you. When you are not being active, find a comfortable position for rest. Some people are comfortable on the floor or a medium-firm bed with a small pillow under their head and another under their knees. Some people prefer to lie on their side with a pillow between their knees. Don't stay in one position for too long. Take short walks (10 to 20 minutes) every 2 to 3 hours. Avoid slopes, hills, and stairs until you feel better. Walk only distances you can manage without pain, especially leg pain. How to do the exercises Pelvic tilt 4. Lie on your back with your knees bent. 5. \"Brace\" your stomach-tighten your muscles by pulling in and imagining your belly button moving toward your spine. 6. Press your lower back into the floor. You should feel your hips and pelvis rock back. 7. Hold for 6 seconds while breathing smoothly. 8. Relax and allow your pelvis and hips to rock forward. 9. Repeat 8 to 12 times. Back stretches 5. Get down on your hands and knees on the floor. 6. Relax your head and allow it to droop. Round your back up toward the ceiling until you feel a nice stretch in your upper, middle, and lower back. Hold this stretch for as long as it feels comfortable, or about 15 to 30 seconds. 7. Return to the starting position with a flat back while you are on your hands and knees. 8. Let your back sway by pressing your stomach toward the floor. Lift your buttocks toward the ceiling. 9. Hold this position for 15 to 30 seconds. 10. Repeat 2 to 4 times. Follow-up care is a key part of your treatment and safety. Be sure to make and go to all appointments, and call your doctor if you are having problems. It's also a good idea to know your test results and keep a list of the medicines you take. Where can you learn more? Go to http://rivka-jarrell.info/. Enter S866 in the search box to learn more about \"Low Back Arthritis: Exercises. \" Current as of: November 29, 2017 Content Version: 11.7 © 8311-2171 [employer]. Care instructions adapted under license by [employer] (which disclaims liability or warranty for this information). If you have questions about a medical condition or this instruction, always ask your healthcare professional. [address] any warranty or liability for your use of this information. Please provide this summary of care documentation to your next provider. Your primary care clinician is listed as Moni Kyle. If you have any questions after today's visit, please call [phone].

## 2018-08-20 NOTE — PATIENT INSTRUCTIONS
Neck Arthritis: Exercises  Your Care Instructions  Here are some examples of typical rehabilitation exercises for your condition. Start each exercise slowly. Ease off the exercise if you start to have pain. Your doctor or physical therapist will tell you when you can start these exercises and which ones will work best for you. How to do the exercises  Neck stretches to the side    1. This stretch works best if you keep your shoulder down as you lean away from it. To help you remember to do this, start by relaxing your shoulders and lightly holding on to your thighs or your chair. 2. Tilt your head toward your shoulder and hold for 15 to 30 seconds. Let the weight of your head stretch your muscles. 3. Repeat 2 to 4 times toward each shoulder. Chin tuck    1. Lie on the floor with a rolled-up towel under your neck. Your head should be touching the floor. 2. Slowly bring your chin toward your chest.  3. Hold for a count of 6, and then relax for up to 10 seconds. 4. Repeat 8 to 12 times. Active cervical rotation    1. Sit in a firm chair, or stand up straight. 2. Keeping your chin level, turn your head to the right, and hold for 15 to 30 seconds. 3. Turn your head to the left and hold for 15 to 30 seconds. 4. Repeat 2 to 4 times to each side. Shoulder blade squeeze    1. While standing, squeeze your shoulder blades together. 2. Do not raise your shoulders up as you are squeezing. 3. Hold for 6 seconds. 4. Repeat 8 to 12 times. Shoulder rolls    1. Sit comfortably with your feet shoulder-width apart. You can also do this exercise standing up. 2. Roll your shoulders up, then back, and then down in a smooth, circular motion. 3. Repeat 2 to 4 times. Follow-up care is a key part of your treatment and safety. Be sure to make and go to all appointments, and call your doctor if you are having problems. It's also a good idea to know your test results and keep a list of the medicines you take.   Where can you learn more? Go to http://rivka-jarrell.info/. Enter X486 in the search box to learn more about \"Neck Arthritis: Exercises. \"  Current as of: November 29, 2017  Content Version: 11.7  © 6058-8373 PureHistory. Care instructions adapted under license by [employer] (which disclaims liability or warranty for this information). If you have questions about a medical condition or this instruction, always ask your healthcare professional. [address] any warranty or liability for your use of this information. Low Back Arthritis: Exercises  Your Care Instructions  Here are some examples of typical rehabilitation exercises for your condition. Start each exercise slowly. Ease off the exercise if you start to have pain. Your doctor or physical therapist will tell you when you can start these exercises and which ones will work best for you. When you are not being active, find a comfortable position for rest. Some people are comfortable on the floor or a medium-firm bed with a small pillow under their head and another under their knees. Some people prefer to lie on their side with a pillow between their knees. Don't stay in one position for too long. Take short walks (10 to 20 minutes) every 2 to 3 hours. Avoid slopes, hills, and stairs until you feel better. Walk only distances you can manage without pain, especially leg pain. How to do the exercises  Pelvic tilt    4. Lie on your back with your knees bent. 5. \"Brace\" your stomach-tighten your muscles by pulling in and imagining your belly button moving toward your spine. 6. Press your lower back into the floor. You should feel your hips and pelvis rock back. 7. Hold for 6 seconds while breathing smoothly. 8. Relax and allow your pelvis and hips to rock forward. 9. Repeat 8 to 12 times. Back stretches    5. Get down on your hands and knees on the floor.   6. Relax your head and allow it to droop. Round your back up toward the ceiling until you feel a nice stretch in your upper, middle, and lower back. Hold this stretch for as long as it feels comfortable, or about 15 to 30 seconds. 7. Return to the starting position with a flat back while you are on your hands and knees. 8. Let your back sway by pressing your stomach toward the floor. Lift your buttocks toward the ceiling. 9. Hold this position for 15 to 30 seconds. 10. Repeat 2 to 4 times. Follow-up care is a key part of your treatment and safety. Be sure to make and go to all appointments, and call your doctor if you are having problems. It's also a good idea to know your test results and keep a list of the medicines you take. Where can you learn more? Go to http://rivka-jarrell.info/. Enter H213 in the search box to learn more about \"Low Back Arthritis: Exercises. \"  Current as of: November 29, 2017  Content Version: 11.7  © 8423-9233 [employer]Pandas[employer]orporated. Care instructions adapted under license by [employer] (which disclaims liability or warranty for this information). If you have questions about a medical condition or this instruction, always ask your healthcare professional. [address] any warranty or liability for your use of this information.

## 2018-08-22 ENCOUNTER — OFFICE VISIT (OUTPATIENT)
Dept: UROLOGY | Age: 63
End: 2018-08-22

## 2018-08-22 VITALS
OXYGEN SATURATION: 95 % | HEIGHT: 70 IN | HEART RATE: 88 BPM | DIASTOLIC BLOOD PRESSURE: 75 MMHG | BODY MASS INDEX: 32.35 KG/M2 | SYSTOLIC BLOOD PRESSURE: 131 MMHG | WEIGHT: 226 LBS

## 2018-08-22 DIAGNOSIS — C61 CA OF PROSTATE (HCC): Primary | ICD-10-CM

## 2018-08-22 NOTE — PROGRESS NOTES
Mr. Kaela Magana has a reminder for a \"due or due soon\" health maintenance. I have asked that he contact his primary care provider for follow-up on this health maintenance.

## 2018-08-22 NOTE — PATIENT INSTRUCTIONS
Prostate Cancer: Care Instructions  Your Care Instructions    The prostate gland is a small, walnut-shaped organ that lies just below a man's bladder. It surrounds the urethra, the tube that carries urine from the bladder out of the body through the penis. Prostate cancer is the abnormal growth of cells in the prostate gland. Prostate cancer cells can spread within the prostate, to nearby lymph nodes and other tissues, and to other parts of the body. When the cancer hasn't spread outside the prostate, it is called localized prostate cancer. With localized prostate cancer, your options depend on how likely it is that your cancer will grow. Tests will show if your cancer is likely to grow. · Low-risk cancer isn't likely to grow right away. If your cancer is low-risk, you can choose active surveillance. This means your cancer will be watched closely by your doctor with regular checkups and tests to see if the cancer grows. This choice allows you to delay having surgery or radiation, often for many years. If the cancer grows very slowly, you may never need treatment. · Medium-risk cancer is more likely to grow. Some men with this type of cancer may be able to choose active surveillance. Others may need to choose surgery or radiation. · High-risk cancer is most likely to grow. If you have high-risk cancer, you will likely need to choose surgery or radiation. If your cancer has already spread outside the prostate or to other parts of the body, then you may have other treatments, like chemotherapy or hormone therapy. If you are over age [de-identified] or have other serious health problems, like heart disease, you may choose not to have treatments to cure your cancer. Instead, you can just have treatments to manage your symptoms. This is called watchful waiting. Finding out that you have cancer is scary. You may feel many emotions and may need some help coping. Seek out family, friends, and counselors for support.  You also can do things at home to make yourself feel better while you go through treatment. Call the [facility] (3-905.157.6089) or visit its website at [address]. Xoopit for more information. Follow-up care is a key part of your treatment and safety. Be sure to make and go to all appointments, and call your doctor if you are having problems. It's also a good idea to know your test results and keep a list of the medicines you take. How can you care for yourself at home? · Your doctor will talk to you about your treatment options. You may need to learn more about each of them before you can decide which treatment is best for you. · Take your medicines exactly as prescribed. Call your doctor if you think you are having a problem with your medicine. You will get more details on the specific medicines your doctor prescribes. · Eat healthy food. If you do not feel like eating, try to eat food that has protein and extra calories to keep up your strength and prevent weight loss. Drink liquid meal replacements for extra calories and protein. Try to eat your main meal early. · Take steps to control your stress and workload. Learn relaxation techniques. ¨ Share your feelings. Stress and tension affect our emotions. By expressing your feelings to others, you may be able to understand and cope with them. ¨ Consider joining a support group. Talking about a problem with your spouse, a good friend, or other people with similar problems is a good way to reduce tension and stress. ¨ Express yourself through art. Try writing, crafts, dance, or art to relieve stress. Some dance, writing, or art groups may be available just for people who have cancer. ¨ Be kind to your body and mind. Getting enough sleep, eating a healthy diet, and taking time to do things you enjoy can contribute to an overall feeling of balance in your life and can help reduce stress. ¨ Get help if you need it.  Discuss your concerns with your doctor or counselor. · Get some physical activity every day, but do not get too tired. Keep doing the hobbies you enjoy as your energy allows. · If you are vomiting or have diarrhea:  ¨ Drink plenty of fluids (enough so that your urine is light yellow or clear like water) to prevent dehydration. Choose water and other caffeine-free clear liquids. If you have kidney, heart, or liver disease and have to limit fluids, talk with your doctor before you increase the amount of fluids you drink. ¨ When you are able to eat, try clear soups, mild foods, and liquids until all symptoms are gone for 12 to 48 hours. Jell-O, dry toast, crackers, and cooked cereal are also good choices. · If you have not already done so, prepare a list of advance directives. Advance directives are instructions to your doctor and family members about what kind of care you want if you become unable to speak or express yourself. When should you call for help? Call 911 anytime you think you may need emergency care. For example, call if:    · You passed out (lost consciousness).    Call your doctor now or seek immediate medical care if:    · You have new or worse pain.     · You have new symptoms, such as a cough, belly pain, vomiting, diarrhea, or a rash.     · You have symptoms of a urinary tract infection. For example:  ¨ You have blood or pus in your urine. ¨ You have pain in your back just below your rib cage. This is called flank pain. ¨ You have a fever, chills, or body aches. ¨ It hurts to urinate. ¨ You have groin or belly pain.    Watch closely for changes in your health, and be sure to contact your doctor if:    · You have swollen glands in your armpits, groin, or neck.     · You have trouble controlling your urine.     · You do not get better as expected. Where can you learn more? Go to http://rivka-jarrell.info/. Enter V141 in the search box to learn more about \"Prostate Cancer: Care Instructions. \"  Current as of:  May 12, 2017  Content Version: 11.7  © 6431-6266 Tamecco, Incorporated. Care instructions adapted under license by [employer] (which disclaims liability or warranty for this information). If you have questions about a medical condition or this instruction, always ask your healthcare professional. [address] any warranty or liability for your use of this information.

## 2018-08-22 NOTE — MR AVS SNAPSHOT
[address] [facility] [address] [place] [address] 
[phone] Patient: Heber Maravilla MRN: [id number] DMD:[id number] Visit Information Date & Time Provider Department Dept. Phone Encounter #  
 8/22/2018  3:30 PM Francesco Allen, [facility] [place] [facility] [id number] Your Appointments 8/30/2018  3:45 PM  
PRE OP with Isabell Ham MD  
[facility] ([facility]) Appt Note: 3 mo f/u; Eye Surgery [address] [facility], [address]  
[phone]  
  
   
 [address] [facility], [address] [facility] [address]  
  
    
 10/11/2018  8:30 AM  
Follow Up with Shree Garza PA-C  
[facility] - [address]) Appt Note: 3 M FU LT KNEE  
 [address] [facility], [address]  
[phone]  
  
   
 [address] [facility], [address] [address]  
  
    
 12/18/2018  3:30 PM  
Follow Up with Gregory Zhu MD  
[address] [facility], [address] [facility] [address]) Appt Note: 4 MO F/U  
 [address] [facility] [address] [phone]  
  
   
 [address] [facility] [address] Upcoming Health Maintenance Date Due Hepatitis C Screening 1955 ZOSTER VACCINE AGE 60> 2/13/2013 GLAUCOMA SCREENING Q2Y 4/13/2018 Pneumococcal 65+ Low/Medium Risk (1 of 2 - PCV13) 4/13/2018 MEDICARE YEARLY EXAM 5/2/2018 Influenza Age 5 to Adult 8/1/2018 DTaP/Tdap/Td series (2 - Td) 12/6/2024 COLONOSCOPY 5/27/2026 Allergies as of 8/22/2018  Review Complete On: 8/22/2018 By: Francesco Allen MD  
  
 Severity Noted Reaction Type Reactions Amoxicillin  07/03/2018    Itching Augmentin [Amoxicillin-pot Clavulanate]    Itching Hibiclens [Chlorhexidine Gluconate]  03/16/2018    Itching Penicillins    Rash Requip [Ropinirole]  05/30/2018    Nausea and Vomiting Current Immunizations  Reviewed on 6/22/2018 Name Date Tdap 12/6/2014 Not reviewed this visit You Were Diagnosed With   
  
 Codes Comments [facility]    -  Primary ICD-10-CM: L75 ICD-9-CM: 891 Vitals BP Pulse Height(growth percentile) Weight(growth percentile) SpO2 BMI  
 131/75 (BP 1 Location: Right arm, BP Patient Position: Sitting) 88 5' 10\" (1.778 m) 226 lb (102.5 kg) 95% 32.43 kg/m2 Smoking Status Never Smoker Vitals History BMI and BSA Data Body Mass Index Body Surface Area  
 32.43 kg/m 2 2.25 m 2 Preferred Pharmacy Pharmacy Name Phone [facility] [address] [place] [address] [phone] Your Updated Medication List  
  
   
This list is accurate as of 8/22/18  4:19 PM.  Always use your most recent med list.  
  
  
  
  
 acetaminophen 500 mg tablet Commonly known as:  TYLENOL Take 1,000 mg by mouth every six (6) hours as needed for Pain. azelastine 137 mcg (0.1 %) nasal spray Commonly known as:  ASTELIN  
1 spray up each nostril twice per day  
  
 butalbital-acetaminophen-caff -40 mg per capsule Commonly known as:  [employer] Take 2 capsules every 8 hours as needed for headache  
  
 celecoxib 200 mg capsule Commonly known as:  CELEBREX Take 1 Cap by mouth two (2) times a day. clindamycin 300 mg capsule Commonly known as:  CLEOCIN  
2 po one hour prior to appt  
  
 clotrimazole-betamethasone topical cream  
Commonly known as:  Abiola More Apply twice per day  
  
 diclofenac 1 % Gel Commonly known as:  VOLTAREN Apply 4 g to affected area four (4) times daily. Maximum 16 grams per joint per day. Dispense 5 100 gram tubes HYDROcodone-acetaminophen  mg tablet Commonly known as:  Lenon Gauze  
 Take 1 Tab by mouth every eight (8) hours as needed for Pain. Max Daily Amount: 3 Tabs. labetalol 100 mg tablet Commonly known as:  NORMODYNE  
TAKE ONE TABLET BY MOUTH TWICE DAILY  
  
 lansoprazole 30 mg capsule Commonly known as:  PREVACID TAKE 1 CAPSULE DAILY BEFOREBREAKFAST  
  
 lisinopril-hydroCHLOROthiazide 20-12.5 mg per tablet Commonly known as:  PRINZIDE, ZESTORETIC  
TAKE 1 TABLET BY MOUTH DAILY  
  
 metaxalone 800 mg tablet Commonly known as:  SKELAXIN Take 1 Tab by mouth three (3) times daily. Indications: Muscle Spasm  
  
 montelukast 10 mg tablet Commonly known as:  SINGULAIR  
TAKE 1 TABLET BY MOUTH EVERY DAY  
  
 nystatin topical cream  
Commonly known as:  MYCOSTATIN  
APPLY EXTERNALLY TO THE AFFECTED AREA TWICE DAILY  
  
 raNITIdine 150 mg tablet Commonly known as:  ZANTAC Take 150 mg by mouth nightly. tamsulosin 0.4 mg capsule Commonly known as:  FLOMAX TAKE 1 CAPSULE BY MOUTH DAILY * warfarin 3 mg tablet Commonly known as:  COUMADIN Or as directed * warfarin 5 mg tablet Commonly known as:  COUMADIN  
TAKE 2 AND 1/2 TABLETS BY MOUTH DAILY OR AS DIRECTED * Notice: This list has 2 medication(s) that are the same as other medications prescribed for you. Read the directions carefully, and ask your doctor or other care provider to review them with you. To-Do List   
 08/22/2018 Imaging:  CT ABD PELV W WO CONT   
  
 08/22/2018 Imaging:  NM BONE SCAN [facility] BODY Patient Instructions Prostate Cancer: Care Instructions Your Care Instructions The prostate gland is a small, walnut-shaped organ that lies just below a man's bladder. It surrounds the urethra, the tube that carries urine from the bladder out of the body through the penis. Prostate cancer is the abnormal growth of cells in the prostate gland.  Prostate cancer cells can spread within the prostate, to nearby lymph nodes and other tissues, and to other parts of the body. When the cancer hasn't spread outside the prostate, it is called localized prostate cancer. With localized prostate cancer, your options depend on how likely it is that your cancer will grow. Tests will show if your cancer is likely to grow. · Low-risk cancer isn't likely to grow right away. If your cancer is low-risk, you can choose active surveillance. This means your cancer will be watched closely by your doctor with regular checkups and tests to see if the cancer grows. This choice allows you to delay having surgery or radiation, often for many years. If the cancer grows very slowly, you may never need treatment. · Medium-risk cancer is more likely to grow. Some men with this type of cancer may be able to choose active surveillance. Others may need to choose surgery or radiation. · High-risk cancer is most likely to grow. If you have high-risk cancer, you will likely need to choose surgery or radiation. If your cancer has already spread outside the prostate or to other parts of the body, then you may have other treatments, like chemotherapy or hormone therapy. If you are over age [de-identified] or have other serious health problems, like heart disease, you may choose not to have treatments to cure your cancer. Instead, you can just have treatments to manage your symptoms. This is called watchful waiting. Finding out that you have cancer is scary. You may feel many emotions and may need some help coping. Seek out family, friends, and counselors for support. You also can do things at home to make yourself feel better while you go through treatment. Call the [employer] (5-800.902.8435) or visit its website at [address]. [employer] for more information. Follow-up care is a key part of your treatment and safety. Be sure to make and go to all appointments, and call your doctor if you are having problems.  It's also a good idea to know your test results and keep a list of the medicines you take. How can you care for yourself at home? · Your doctor will talk to you about your treatment options. You may need to learn more about each of them before you can decide which treatment is best for you. · Take your medicines exactly as prescribed. Call your doctor if you think you are having a problem with your medicine. You will get more details on the specific medicines your doctor prescribes. · Eat healthy food. If you do not feel like eating, try to eat food that has protein and extra calories to keep up your strength and prevent weight loss. Drink liquid meal replacements for extra calories and protein. Try to eat your main meal early. · Take steps to control your stress and workload. Learn relaxation techniques. ¨ Share your feelings. Stress and tension affect our emotions. By expressing your feelings to others, you may be able to understand and cope with them. ¨ Consider joining a support group. Talking about a problem with your spouse, a good friend, or other people with similar problems is a good way to reduce tension and stress. ¨ Express yourself through art. Try writing, crafts, dance, or art to relieve stress. Some dance, writing, or art groups may be available just for people who have cancer. ¨ Be kind to your body and mind. Getting enough sleep, eating a healthy diet, and taking time to do things you enjoy can contribute to an overall feeling of balance in your life and can help reduce stress. ¨ Get help if you need it. Discuss your concerns with your doctor or counselor. · Get some physical activity every day, but do not get too tired. Keep doing the hobbies you enjoy as your energy allows. · If you are vomiting or have diarrhea: ¨ Drink plenty of fluids (enough so that your urine is light yellow or clear like water) to prevent dehydration. Choose water and other caffeine-free clear liquids.  If you have kidney, heart, or liver disease and have to limit fluids, talk with your doctor before you increase the amount of fluids you drink. ¨ When you are able to eat, try clear soups, mild foods, and liquids until all symptoms are gone for 12 to 48 hours. Jell-O, dry toast, crackers, and cooked cereal are also good choices. · If you have not already done so, prepare a list of advance directives. Advance directives are instructions to your doctor and family members about what kind of care you want if you become unable to speak or express yourself. When should you call for help? Call 911 anytime you think you may need emergency care. For example, call if: 
  · You passed out (lost consciousness).  
 Call your doctor now or seek immediate medical care if: 
  · You have new or worse pain.  
  · You have new symptoms, such as a cough, belly pain, vomiting, diarrhea, or a rash.  
  · You have symptoms of a urinary tract infection. For example: ¨ You have blood or pus in your urine. ¨ You have pain in your back just below your rib cage. This is called flank pain. ¨ You have a fever, chills, or body aches. ¨ It hurts to urinate. ¨ You have groin or belly pain.  
 Watch closely for changes in your health, and be sure to contact your doctor if: 
  · You have swollen glands in your armpits, groin, or neck.  
  · You have trouble controlling your urine.  
  · You do not get better as expected. Where can you learn more? Go to http://rivka-jarrell.info/. Enter V141 in the search box to learn more about \"Prostate Cancer: Care Instructions. \" Current as of: May 12, 2017 Content Version: 11.7 © 9947-6378 Sportilia. Care instructions adapted under license by X[employer] (which disclaims liability or warranty for this information).  If you have questions about a medical condition or this instruction, always ask your healthcare professional. Lava[employer] disclaims any warranty or liability for your use of this information. Please provide this summary of care documentation to your next provider. Your primary care clinician is listed as Rodolfo Funez. If you have any questions after today's visit, please call [phone].

## 2018-08-23 NOTE — PROGRESS NOTES
Bernard Winters 70 y.o. male                             Prostate Carcinoma Sierra 7 Histology/PSA 5.3     Chris Kirkland seen today for prostate biopsy pathology report   patient has history of symptomatic BPH responding favorably to alpha-blocker therapy with Flomax   Family history significant for prostate carcinoma developing in father at age 61 and older brother developing prostate cancer at age 54      PSA 5.3 in May 2018           transperineal prostate biopsy on 15 August 2018 prostate volume 35 cc PSA density 0.15 Sierra 7 adenocarcinoma bilaterally - 8 of 12 cores positive      Review of Systems:   CNS: No seizure syncope headaches dizziness or visual changes  Respiratory: Chronic cough-no chest pain no wheezing - no shortness of breath  Cardiovascular: No angina no palpitations-DVT on Coumadin anticoagulation   intestinal: Dyspepsia diarrhea or constipation urinary: Irritative and obstructive voiding symptoms responding favorably to alpha-blocker therapy  Skeletal: Chronic low back pain  Endocrine: No diabetes or thyroid disease  Other:    Allergies: Allergies   Allergen Reactions    Amoxicillin Itching    Augmentin [Amoxicillin-Pot Clavulanate] Itching    Hibiclens [Chlorhexidine Gluconate] Itching    Penicillins Rash    Requip [Ropinirole] Nausea and Vomiting      Medications:    Current Outpatient Prescriptions   Medication Sig Dispense Refill    lansoprazole (PREVACID) 30 mg capsule TAKE 1 CAPSULE DAILY BEFOREBREAKFAST 90 Cap 3    warfarin (COUMADIN) 5 mg tablet TAKE 2 AND 1/2 TABLETS BY MOUTH DAILY OR AS DIRECTED 75 Tab 0    tamsulosin (FLOMAX) 0.4 mg capsule TAKE 1 CAPSULE BY MOUTH DAILY 30 Cap 0    celecoxib (CELEBREX) 200 mg capsule Take 1 Cap by mouth two (2) times a day. (Patient taking differently: Take 200 mg by mouth daily. ) 180 Cap 0    butalbital-acetaminophen-caff (FIORICET) -40 mg per capsule Take 2 capsules every 8 hours as needed for headache 126 Cap 1    lisinopril-hydroCHLOROthiazide (PRINZIDE, ZESTORETIC) 20-12.5 mg per tablet TAKE 1 TABLET BY MOUTH DAILY 90 Tab 0    diclofenac (VOLTAREN) 1 % gel Apply 4 g to affected area four (4) times daily. Maximum 16 grams per joint per day. Dispense 5 100 gram tubes 5 Each 0    clotrimazole-betamethasone (LOTRISONE) topical cream Apply twice per day 45 g 1    nystatin (MYCOSTATIN) topical cream APPLY EXTERNALLY TO THE AFFECTED AREA TWICE DAILY 15 g 0    warfarin (COUMADIN) 3 mg tablet Or as directed 30 Tab 3    raNITIdine (ZANTAC) 150 mg tablet Take 150 mg by mouth nightly. 5    labetalol (NORMODYNE) 100 mg tablet TAKE ONE TABLET BY MOUTH TWICE DAILY (Patient taking differently: TAKE ONE TABLET BY MOUTH DAILY) 180 Tab 0    azelastine (ASTELIN) 137 mcg (0.1 %) nasal spray 1 spray up each nostril twice per day (Patient taking differently: 1 Spray by Both Nostrils route daily. Using once per day) 1 Bottle 1    metaxalone (SKELAXIN) 800 mg tablet Take 1 Tab by mouth three (3) times daily. Indications: Muscle Spasm (Patient taking differently: Take 800 mg by mouth three (3) times daily as needed. Indications: Muscle Spasm) 90 Tab 2    montelukast (SINGULAIR) 10 mg tablet TAKE 1 TABLET BY MOUTH EVERY DAY (Patient taking differently: TAKE 1 TABLET BY MOUTH EVERY HS) 90 Tab 0    acetaminophen (TYLENOL) 500 mg tablet Take 1,000 mg by mouth every six (6) hours as needed for Pain.  clindamycin (CLEOCIN) 300 mg capsule 2 po one hour prior to appt 6 Cap 1    HYDROcodone-acetaminophen (NORCO)  mg tablet Take 1 Tab by mouth every eight (8) hours as needed for Pain. Max Daily Amount: 3 Tabs.  36 Tab 0       Past Medical History:   Diagnosis Date    Arthritis     Bleeding     Blood clot in the legs     Chronic pain     knee and shoulder    Esophageal reflux     GERD (gastroesophageal reflux disease)     Headache(784.0)     migraine    High cholesterol     Hypertension     Lower back pain 11/6/2010    Other chest pain     Personal history of venous thrombosis and embolism     Pure hypercholesterolemia     Right buttock pain 11/6/2010    Right foot pain     Rotator cuff tear     left-since 2010, worsened tear Young.    Spinal stenosis     Tendonitis, tibialis     anterior    Thromboembolus ([facility] [address]     3 after sx last one 2000      Past Surgical History:   Procedure Laterality Date    FOOT/TOES SURGERY PROC UNLISTED      HX BACK SURGERY      HX ORTHOPAEDIC  06-25-12    Right foot with excision of bursa and adipose tissue from fifth metatarsal base by Dr. Diana Houston      lower back (1992 and 2000)    HX OTHER SURGICAL      left foot (2008)    LAMINOTOMY      NERVE BLOCK       Social History     Social History    Marital status:      Spouse name: N/A    Number of children: N/A    Years of education: N/A     Occupational History    Not on file. Social History Main Topics    Smoking status: Never Smoker    Smokeless tobacco: Never Used    Alcohol use No    Drug use: No    Sexual activity: Not Currently     Other Topics Concern    Not on file     Social History Narrative      Family History   Problem Relation Age of Onset   24 [facility] Arthritis-rheumatoid Mother     Dementia Mother     Heart Disease Father     Cancer Father     Hypertension Other             Physical Examination: Well - nourished mature male in no distress    Prostate Biopsy Pathology Report:   A. PROSTATE, RIGHT APEX, BIOPSY:   PROSTATIC ADENOCARCINOMA, ROGELIO SCORE 3+3=6.   B. PROSTATE, RIGHT MID, BIOPSY:   -PROSTATIC ADENOCARCINOMA, ROGELIO SCORE 3+4=7.   -HIGH GRADE PIN. C. PROSTATE, RIGHT BASE, BIOPSY:   BENIGN PROSTATIC TISSUE. D. PROSTATE, LEFT APEX, BIOPSY:   PROSTATIC ADENOCARCINOMA, ROGELIO SCORE 3+3=6.   E. PROSTATE, LEFT MID, BIOPSY:   PROSTATIC ADENOCARCINOMA, ROGELIO SCORE 3+4=7.   F. PROSTATE, LEFT BASE, BIOPSY:   PROSTATIC ADENOCARCINOMA, ROGELIO SCORE 3+4=7.      Impression: [place] 6-7 adenocarcinoma the prostate/PSA 5.4    Plan: Bone scan and CT scan imaging of the abdomen and pelvis metastatic assessment      1. Described and discussed definitive treatment options - prostatectomy by open or laparoscopic technique, radioactive seed implantation, radiation by external beam, proton beam or     Cryotherapy. 2. Pros/Cons of definative treatment for prostate cancer described  3. Refer to the prostate cancer support groups \"US TOO\" and \"Man to Man\"  4. Prostate cancer book provided   5. Described referral to Radiation Oncology and urologic oncology for evaluation and consideration as a candidate for radiation therapy, radical prostatectomy, or cryotherapy. 6  Described discussed prospect of hormone ablation therapy as adjuncts to radiation therapy-also described rationale for fiducial marker placement and spacer gel implantation      RTC 3 weeks     More than 1/2 of this 25 minute visit was spent in counselling and coordination of care, as described above. Bee Jama MD  -electronically signed-    PLEASE NOTE:  This document has been produced using voice recognition software. Unrecognized errors in transcription may be present.

## 2018-08-24 ENCOUNTER — OFFICE VISIT (OUTPATIENT)
Dept: ORTHOPEDIC SURGERY | Facility: CLINIC | Age: 63
End: 2018-08-24

## 2018-08-24 VITALS
BODY MASS INDEX: 31.98 KG/M2 | SYSTOLIC BLOOD PRESSURE: 150 MMHG | DIASTOLIC BLOOD PRESSURE: 82 MMHG | HEART RATE: 79 BPM | OXYGEN SATURATION: 96 % | HEIGHT: 70 IN | TEMPERATURE: 97.8 F | RESPIRATION RATE: 16 BRPM | WEIGHT: 223.4 LBS

## 2018-08-24 DIAGNOSIS — M76.32 ILIOTIBIAL BAND TENDINITIS OF LEFT SIDE: ICD-10-CM

## 2018-08-24 DIAGNOSIS — Z96.652 STATUS POST TOTAL LEFT KNEE REPLACEMENT: Primary | ICD-10-CM

## 2018-08-24 DIAGNOSIS — M70.52 PES ANSERINUS BURSITIS OF LEFT KNEE: ICD-10-CM

## 2018-08-24 RX ORDER — BETAMETHASONE SODIUM PHOSPHATE AND BETAMETHASONE ACETATE 3; 3 MG/ML; MG/ML
6 INJECTION, SUSPENSION INTRA-ARTICULAR; INTRALESIONAL; INTRAMUSCULAR; SOFT TISSUE ONCE
Qty: 1 ML | Refills: 0
Start: 2018-08-24 | End: 2018-08-24

## 2018-08-24 NOTE — PROGRESS NOTES
Patient: Lucian Swan  YOB: 1955       HISTORY:  The patient presents for reevaluation of his s/p left total knee arthroplasty on 3/27/18. Patient is improved, states pain is a 5 out of 10.  he has finished outpatient physical therapy. has been doing knee exercises at home. He comes in today because he is having some pain after kneeling down on his total knee to get something under a bed earlier this week. He is still having IT band tightness and pes bursitis. The volteran gel is helping some but he is still having pain. He is also having some numbness and tingling on the bottom of his left foot and up his left lateral lower leg since kneeling down. He has history of back problems and 2 back surgeries in the past. He sees Dr. Diana Saunders for his back. Patient denies any fever, chills, chest pain, shortness of breath or calf pain. There are no new illness or injuries to report since last seen in the office. PHYSICAL EXAMINATION:    Visit Vitals    /82    Pulse 79    Temp 97.8 °F (36.6 °C) (Oral)    Resp 16    Ht 5' 10\" (1.778 m)    Wt 223 lb 6.4 oz (101.3 kg)    SpO2 96%    BMI 32.05 kg/m2     The patient is a well-developed, well-nourished male in no acute distress. The patient is alert and oriented times three. The patient appears to be well groomed. Mood and affect are normal.   ORTHOPEDIC EXAM of Left knee: Inspection: Effusion not present,  Incision well healed  TTP: pes anserinus   Range of motion: 0-120 flexion  Stability: Stable   Strength: 5/5  2+ distal pulses      PROCEDURE: After sterile prep, 1 cc of celestone and 1 cc of lidocaine were injected into the left pes anserinus bursa.        [facility] - [facility]  OFFICE PROCEDURE PROGRESS NOTE        Chart reviewed for the following:  Charu KO PA, have reviewed the History, Physical and updated the Allergic reactions for [place]     TIME OUT performed immediately prior to start of procedure:  Leyda KO PA-C, have performed the following reviews on Marchelle Nones prior to the start of the procedure:            * Patient was identified by name and date of birth   * Agreement on procedure being performed was verified  * Risks and Benefits explained to the patient  * Procedure site verified and marked as necessary  * Patient was positioned for comfort  * Consent was signed and verified     Time: 2:55 PM    Date of procedure: 8/24/2018    Procedure performed by:  CARA Hoff    Provider assisted by: (see medication administration)    How tolerated by patient: tolerated the procedure well with no complications    Comments: none    IMPRESSION:  Status post Left total knee arthroplasty. PLAN:  Pt is having increased pain for the last week. Patient is weight bearing as tolerated. He will try these exercises to help with IT band tightness and no PT at this time. He has some other medical issues that are more pressing at this time and can not go to PT. Left Pes Anserinus Bursa was injected today. Pt tolerated it well with no complications. He will follow up with Dr. Kendall Weller as I think his numbness and tingling of the foot and left lateral lower leg is coming from his back. Patient not given a refill of pain medication.    Patient will follow up in 6 weeks to see if the injection and exercises have helped his left knee pain    [facility]

## 2018-08-29 ENCOUNTER — HOSPITAL ENCOUNTER (OUTPATIENT)
Dept: NUCLEAR MEDICINE | Age: 63
Discharge: HOME | End: 2018-08-29
Attending: UROLOGY
Payer: MEDICARE

## 2018-08-29 ENCOUNTER — HOSPITAL ENCOUNTER (OUTPATIENT)
Dept: CT IMAGING | Age: 63
Discharge: HOME | End: 2018-08-29
Attending: UROLOGY
Payer: MEDICARE

## 2018-08-29 DIAGNOSIS — C61 CA OF PROSTATE (HCC): ICD-10-CM

## 2018-08-29 LAB — CREAT UR-MCNC: 1 MG/DL (ref 0.6–1.3)

## 2018-08-29 PROCEDURE — 82565 ASSAY OF CREATININE: CPT

## 2018-08-29 PROCEDURE — 74177 CT ABD & PELVIS W/CONTRAST: CPT

## 2018-08-29 PROCEDURE — 78306 BONE IMAGING WHOLE BODY: CPT

## 2018-08-29 PROCEDURE — 74011636320 HC RX REV CODE- 636/320: Performed by: UROLOGY

## 2018-08-29 RX ADMIN — IOPAMIDOL 100 ML: 612 INJECTION, SOLUTION INTRAVENOUS at 07:17

## 2018-08-30 ENCOUNTER — OFFICE VISIT (OUTPATIENT)
Dept: INTERNAL MEDICINE CLINIC | Age: 63
End: 2018-08-30

## 2018-08-30 VITALS
HEIGHT: 70 IN | WEIGHT: 220 LBS | SYSTOLIC BLOOD PRESSURE: 150 MMHG | TEMPERATURE: 98.5 F | HEART RATE: 89 BPM | DIASTOLIC BLOOD PRESSURE: 86 MMHG | BODY MASS INDEX: 31.5 KG/M2 | OXYGEN SATURATION: 97 %

## 2018-08-30 DIAGNOSIS — Z79.01 WARFARIN ANTICOAGULATION: ICD-10-CM

## 2018-08-30 DIAGNOSIS — I10 ESSENTIAL HYPERTENSION: ICD-10-CM

## 2018-08-30 DIAGNOSIS — M79.605 LEFT LEG PAIN: ICD-10-CM

## 2018-08-30 DIAGNOSIS — Z01.818 PRE-OP EVALUATION: ICD-10-CM

## 2018-08-30 DIAGNOSIS — Z96.652 STATUS POST LEFT KNEE REPLACEMENT: ICD-10-CM

## 2018-08-30 DIAGNOSIS — R73.9 HIGH BLOOD SUGAR: Primary | ICD-10-CM

## 2018-08-30 LAB
GLUCOSE POC: 106 MG/DL
HBA1C MFR BLD HPLC: 5.9 %

## 2018-08-30 NOTE — PROGRESS NOTES
Donniechu Abbott presents today for   Chief Complaint   Patient presents with    Pre-op Exam    Leg Pain     left    Results       Jojo Abbott preferred language for health care discussion is english. Is someone accompanying this pt? Yes wife    Is the patient using any DME equipment during [address]? No      Depression Screening:  PHQ over the last two weeks 8/30/2018   PHQ Not Done -   Little interest or pleasure in doing things Several days   Feeling down, depressed, irritable, or hopeless Not at all   Total Score PHQ 2 1       Learning Assessment:  Learning Assessment 9/25/2015   PRIMARY LEARNER Patient   HIGHEST LEVEL OF EDUCATION - PRIMARY LEARNER  GRADUATED HIGH SCHOOL OR GED   BARRIERS PRIMARY LEARNER NONE   CO-LEARNER CAREGIVER No   PRIMARY LANGUAGE ENGLISH    NEED No   LEARNER PREFERENCE PRIMARY DEMONSTRATION   ANSWERED BY patient   RELATIONSHIP SELF       Abuse Screening:  Abuse Screening Questionnaire 7/3/2018   Do you ever feel afraid of your partner? N   Are you in a relationship with someone who physically or mentally threatens you? N   Is it safe for you to go home? Y       Fall Risk  Fall Risk Assessment, last 12 mths 8/30/2018   Able to walk? Yes   Fall in past 12 months? No       Health Maintenance reviewed and discussed and ordered per Provider. Health Maintenance Due   Topic Date Due    Hepatitis C Screening  1955    ZOSTER VACCINE AGE 60>  02/13/2013    GLAUCOMA SCREENING Q2Y  04/13/2018    Pneumococcal 65+ High/Highest Risk (1 of 2 - PCV13) 04/13/2018    MEDICARE YEARLY EXAM  05/02/2018    Influenza Age 9 to Adult  08/01/2018   . Pt currently taking Antiplatelet therapy? Yes coumadin    Coordination of Care:  1. Have you been to the ER, urgent care clinic since your last visit? no Hospitalized since your last visit? no    2.  Have you seen or consulted any other health care providers outside of the [address] [place] [address] since your last visit? no Include any pap smears or colon screening.  n0    .

## 2018-08-30 NOTE — PROGRESS NOTES
The patient presents to the office today with the chief complaint of decreased vision right eye and for a pre op evaluation    HPI    Jojo Abbott complains of decreased vision in his let eye from a retinal problem near his macula. he is now scheduled for surgical correction. Other than his vision the patient has complaints of pain and swelling in his left leg - from just distal from his knee down to his toes. It began after he stepped in a hole. The patient denies chest pain or dyspnea. The patient notes a high blood sugar on previous blood work    Review of Systems   Eyes: Positive for blurred vision. Respiratory: Negative for shortness of breath. Cardiovascular: Negative for chest pain and leg swelling. Musculoskeletal: Positive for joint pain. Allergies   Allergen Reactions    Amoxicillin Itching    Augmentin [Amoxicillin-Pot Clavulanate] Itching    Hibiclens [Chlorhexidine Gluconate] Itching    Penicillins Rash    Requip [Ropinirole] Nausea and Vomiting       Current Outpatient Prescriptions   Medication Sig Dispense Refill    lansoprazole (PREVACID) 30 mg capsule TAKE 1 CAPSULE DAILY BEFOREBREAKFAST 90 Cap 3    warfarin (COUMADIN) 5 mg tablet TAKE 2 AND 1/2 TABLETS BY MOUTH DAILY OR AS DIRECTED 75 Tab 0    tamsulosin (FLOMAX) 0.4 mg capsule TAKE 1 CAPSULE BY MOUTH DAILY 30 Cap 0    celecoxib (CELEBREX) 200 mg capsule Take 1 Cap by mouth two (2) times a day. (Patient taking differently: Take 200 mg by mouth daily. ) 180 Cap 0    butalbital-acetaminophen-caff (FIORICET) -40 mg per capsule Take 2 capsules every 8 hours as needed for headache 126 Cap 1    lisinopril-hydroCHLOROthiazide (PRINZIDE, ZESTORETIC) 20-12.5 mg per tablet TAKE 1 TABLET BY MOUTH DAILY 90 Tab 0    diclofenac (VOLTAREN) 1 % gel Apply 4 g to affected area four (4) times daily. Maximum 16 grams per joint per day.  Dispense 5 100 gram tubes 5 Each 0    clotrimazole-betamethasone (LOTRISONE) topical cream Apply twice per day 45 g 1    warfarin (COUMADIN) 3 mg tablet Or as directed 30 Tab 3    labetalol (NORMODYNE) 100 mg tablet TAKE ONE TABLET BY MOUTH TWICE DAILY (Patient taking differently: TAKE ONE TABLET BY MOUTH DAILY) 180 Tab 0    metaxalone (SKELAXIN) 800 mg tablet Take 1 Tab by mouth three (3) times daily. Indications: Muscle Spasm (Patient taking differently: Take 800 mg by mouth three (3) times daily as needed. Indications: Muscle Spasm) 90 Tab 2    montelukast (SINGULAIR) 10 mg tablet TAKE 1 TABLET BY MOUTH EVERY DAY (Patient taking differently: TAKE 1 TABLET BY MOUTH EVERY HS) 90 Tab 0    acetaminophen (TYLENOL) 500 mg tablet Take 1,000 mg by mouth every six (6) hours as needed for Pain. Past Medical History:   Diagnosis Date    Arthritis     Bleeding     Blood clot in the legs     Chronic pain     knee and shoulder    Esophageal reflux     GERD (gastroesophageal reflux disease)     Headache(784.0)     migraine    High cholesterol     Hypertension     Lower back pain 11/6/2010    Other chest pain     Personal history of venous thrombosis and embolism     Pure hypercholesterolemia     Right buttock pain 11/6/2010    Right foot pain     Rotator cuff tear     left-since 2010, worsened tear Young.    Spinal stenosis     Tendonitis, tibialis     anterior    Thromboembolus ([facility] [address]     3 after sx last one 2000       Past Surgical History:   Procedure Laterality Date    FOOT/TOES SURGERY PROC UNLISTED      HX BACK SURGERY      HX ORTHOPAEDIC  06-25-12    Right foot with excision of bursa and adipose tissue from fifth metatarsal base by Dr. Genet Brian      lower back (1992 and 2000)    HX OTHER SURGICAL      left foot (2008)    LAMINOTOMY      NERVE BLOCK         Social History     Social History    Marital status:      Spouse name: N/A    Number of children: N/A    Years of education: N/A     Occupational History    Not on file.      Social History Main Topics    Smoking status: Never Smoker    Smokeless tobacco: Never Used    Alcohol use No    Drug use: No    Sexual activity: Not Currently     Other Topics Concern    Not on file     Social History Narrative       Patient does not have an advanced directive on file    Visit Vitals    /86 (BP 1 Location: Left arm, BP Patient Position: Sitting)    Pulse 89    Temp 98.5 °F (36.9 °C) (Tympanic)    Ht 5' 10\" (1.778 m)    Wt 220 lb (99.8 kg)    SpO2 97%    BMI 31.57 kg/m2       Physical Exam    BMI:  The patient was advised to limit calories to 2000 macey and carbohydrates to 100 grams daily      Office Visit on 08/30/2018   Component Date Value Ref Range Status    Hemoglobin A1c (POC) 08/30/2018 5.9  % Final    Glucose POC 08/30/2018 106  mg/dL Final   Hospital Outpatient Visit on 08/29/2018   Component Date Value Ref Range Status    Creatinine, POC 08/29/2018 1.0  0.6 - 1.3 MG/DL Final    GFRAA, POC 08/29/2018 >60  >60 ml/min/1.73m2 Final    GFRNA, POC 08/29/2018 >60  >60 ml/min/1.73m2 Final    Comment: Estimated GFR is calculated using the IDMS-traceable Modification of Diet in Renal Disease (MDRD) Study equation, reported for both  Americans (GFRAA) and non- Americans (GFRNA), and normalized to 1.73m2 body surface area. The physician must decide which value applies to the patient. The MDRD study equation should only be used in individuals age 25 or older. It has not been validated for the following: pregnant women, patients with serious comorbid conditions, or on certain medications, or persons with extremes of body size, muscle mass, or nutritional status.      Office Visit on 08/15/2018   Component Date Value Ref Range Status    Color (UA POC) 08/15/2018 Yellow   Final    Clarity (UA POC) 08/15/2018 Clear   Final    Glucose (UA POC) 08/15/2018 Negative  Negative Final    Bilirubin (UA POC) 08/15/2018 Negative  Negative Final    Ketones (UA POC) 08/15/2018 Negative Negative Final    Specific gravity (UA POC) 08/15/2018 1.020  1.001 - 1.035 Final    Blood (UA POC) 08/15/2018 Negative  Negative Final    pH (UA POC) 08/15/2018 5.5  4.6 - 8.0 Final    Protein (UA POC) 08/15/2018 Negative  Negative Final    Urobilinogen (UA POC) 08/15/2018 0.2 mg/dL  0.2 - 1 Final    Nitrites (UA POC) 08/15/2018 Negative  Negative Final    Leukocyte esterase (UA POC) 08/15/2018 Negative  Negative Final   Anti-Coag visit on 08/09/2018   Component Date Value Ref Range Status    VALID INTERNAL CONTROL POC 08/09/2018 Yes   Final    INR POC 08/09/2018 1.6   Final   Office Visit on 07/03/2018   Component Date Value Ref Range Status    Color (UA POC) 07/03/2018 Yellow   Final    Clarity (UA POC) 07/03/2018 Clear   Final    Glucose (UA POC) 07/03/2018 Negative  Negative Final    Bilirubin (UA POC) 07/03/2018 Negative  Negative Final    Ketones (UA POC) 07/03/2018 Negative  Negative Final    Specific gravity (UA POC) 07/03/2018 1.030  1.001 - 1.035 Final    Blood (UA POC) 07/03/2018 Negative  Negative Final    pH (UA POC) 07/03/2018 5.5  4.6 - 8.0 Final    Protein (UA POC) 07/03/2018 Negative  Negative Final    Urobilinogen (UA POC) 07/03/2018 0.2 mg/dL  0.2 - 1 Final    Nitrites (UA POC) 07/03/2018 Negative  Negative Final    Leukocyte esterase (UA POC) 07/03/2018 Negative  Negative Final   Hospital Outpatient Visit on 06/29/2018   Component Date Value Ref Range Status    Creatinine, POC 06/29/2018 1.0  0.6 - 1.3 MG/DL Final    GFRAA, POC 06/29/2018 >60  >60 ml/min/1.73m2 Final    GFRNA, POC 06/29/2018 >60  >60 ml/min/1.73m2 Final    Comment: Estimated GFR is calculated using the IDMS-traceable Modification of Diet in Renal Disease (MDRD) Study equation, reported for both  Americans (GFRAA) and non- Americans (GFRNA), and normalized to 1.73m2 body surface area. The physician must decide which value applies to the patient.   The MDRD study equation should only be used in individuals age 25 or older. It has not been validated for the following: pregnant women, patients with serious comorbid conditions, or on certain medications, or persons with extremes of body size, muscle mass, or nutritional status. Hospital Outpatient Visit on 06/18/2018   Component Date Value Ref Range Status    C-Reactive protein 06/18/2018 0.6* 0 - 0.3 mg/dL Final    WBC 06/18/2018 5.5  4.6 - 13.2 K/uL Final    RBC 06/18/2018 4.13* 4.70 - 5.50 M/uL Final    HGB 06/18/2018 12.6* 13.0 - 16.0 g/dL Final    HCT 06/18/2018 37.7  36.0 - 48.0 % Final    MCV 06/18/2018 91.3  74.0 - 97.0 FL Final    MCH 06/18/2018 30.5  24.0 - 34.0 PG Final    MCHC 06/18/2018 33.4  31.0 - 37.0 g/dL Final    RDW 06/18/2018 13.6  11.6 - 14.5 % Final    PLATELET 35/45/2772 572  135 - 420 K/uL Final    MPV 06/18/2018 11.3  9.2 - 11.8 FL Final    NEUTROPHILS 06/18/2018 56  40 - 73 % Final    LYMPHOCYTES 06/18/2018 26  21 - 52 % Final    MONOCYTES 06/18/2018 15* 3 - 10 % Final    EOSINOPHILS 06/18/2018 2  0 - 5 % Final    BASOPHILS 06/18/2018 1  0 - 2 % Final    ABS. NEUTROPHILS 06/18/2018 3.1  1.8 - 8.0 K/UL Final    ABS. LYMPHOCYTES 06/18/2018 1.4  0.9 - 3.6 K/UL Final    ABS. MONOCYTES 06/18/2018 0.8  0.05 - 1.2 K/UL Final    ABS. EOSINOPHILS 06/18/2018 0.1  0.0 - 0.4 K/UL Final    ABS. BASOPHILS 06/18/2018 0.0  0.0 - 0.1 K/UL Final    DF 06/18/2018 AUTOMATED    Final    Interleukin-6 (IL-6) 06/18/2018 3.0  0.0 - 15.5 pg/mL Final    Comment: (NOTE)  Results for this test are for research purposes only by the assay's  . The performance characteristics of this product have  not been established. Results should not be used as a diagnostic  procedure without confirmation of the diagnosis by another  medically established diagnostic product or procedure.   Performed At: [facility] [id number] [place] [address] [id number]  Mei Torres MD [id number]      Sed rate, automated 06/18/2018 6  0 - 20 mm/hr Final    Uric acid 2018 6.4  2.6 - 7.2 MG/DL Final    Comment: The drugs N-acetylcysteine (NAC) and  Metamiszole have been found to cause falsely  low results in this chemical assay. Please  be sure to submit blood samples obtained  BEFORE administration of either of these  drugs to assure correct results. .  Results for orders placed or performed in visit on 18   AMB POC HEMOGLOBIN A1C   Result Value Ref Range    Hemoglobin A1c (POC) 5.9 %   AMB POC GLUCOSE BLOOD, BY GLUCOSE MONITORING DEVICE   Result Value Ref Range    Glucose  mg/dL       Pre op testin    Assessment / Plan      ICD-10-CM ICD-9-CM    1. High blood sugar R73.9 790.29 AMB POC HEMOGLOBIN A1C      AMB POC GLUCOSE BLOOD, BY GLUCOSE MONITORING DEVICE   2. Pre-op evaluation Z01.818 V72.84    3. Warfarin anticoagulation Z79.01 V58.61    4. Essential hypertension I10 401.9    5. Status post left knee replacement Z96.652 V43.65    6. Left leg pain M79.605 729.5        THE PATIENT IS OK FOR SURGERY    Course of Prednisone  he was advised to continue his maintenance medications      Follow-up Disposition:  Return in about 3 months (around 2018). I asked Harjit Torres if he has any questions and I answered the questions.   Harjit Torres states that he understands the treatment plan and agrees with the treatment plan

## 2018-08-30 NOTE — PROGRESS NOTES
Chief Complaint   Patient presents with    Pre-op Exam       Depression Screening:  PHQ over the last two weeks 8/24/2018   PHQ Not Done -   Little interest or pleasure in doing things Not at all   Feeling down, depressed, irritable, or hopeless Not at all   Total Score PHQ 2 0       Learning Assessment:  Learning Assessment 9/25/2015   PRIMARY LEARNER Patient   HIGHEST LEVEL OF EDUCATION - PRIMARY LEARNER  GRADUATED HIGH SCHOOL OR GED   BARRIERS PRIMARY LEARNER NONE   CO-LEARNER CAREGIVER No   PRIMARY LANGUAGE ENGLISH    NEED No   LEARNER PREFERENCE PRIMARY DEMONSTRATION   ANSWERED BY patient   RELATIONSHIP SELF       Abuse Screening:  Abuse Screening Questionnaire 7/3/2018   Do you ever feel afraid of your partner? N   Are you in a relationship with someone who physically or mentally threatens you? N   Is it safe for you to go home? Y       Fall Risk  Fall Risk Assessment, last 12 mths 8/24/2018   Able to walk? Yes   Fall in past 12 months? No             1. Have you been to the ER, urgent care clinic since your last visit? Hospitalized since your last visit? No    2. Have you seen or consulted any other health care providers outside of the [address] [place] [address] since your last visit? Include any pap smears or colon screening.  No

## 2018-08-31 DIAGNOSIS — M62.838 MUSCLE SPASM: ICD-10-CM

## 2018-08-31 DIAGNOSIS — M17.12 PRIMARY OSTEOARTHRITIS OF LEFT KNEE: Primary | ICD-10-CM

## 2018-08-31 DIAGNOSIS — F51.01 PRIMARY INSOMNIA: ICD-10-CM

## 2018-08-31 RX ORDER — PREDNISONE 10 MG/1
TABLET ORAL
Qty: 20 TAB | Refills: 0 | Status: SHIPPED | OUTPATIENT
Start: 2018-08-31 | End: 2018-10-08

## 2018-08-31 RX ORDER — ALPRAZOLAM 0.5 MG/1
TABLET ORAL
Qty: 30 TAB | Refills: 0 | Status: SHIPPED | OUTPATIENT
Start: 2018-08-31 | End: 2019-09-23 | Stop reason: SDUPTHER

## 2018-08-31 RX ORDER — TAMSULOSIN HYDROCHLORIDE 0.4 MG/1
CAPSULE ORAL
Qty: 30 CAP | Refills: 0 | Status: SHIPPED | OUTPATIENT
Start: 2018-08-31 | End: 2018-10-03 | Stop reason: SDUPTHER

## 2018-09-05 ENCOUNTER — DOCUMENTATION ONLY (OUTPATIENT)
Dept: ORTHOPEDIC SURGERY | Age: 63
End: 2018-09-05

## 2018-09-05 NOTE — PROGRESS NOTES
Patients wife, Young (ok per HIPAA), called to see if Dr. Jon Gomez could advise her about an \"equipment chair\" that is required for patient to use after eye surgery. After his eye surgery, the patient is required to keep his head down by sitting in a special chair that has to be ordered by 5 pm today. Young was advised that patient was seen primarily by a retired provider for his shoulder pain and only saw Dr. Jon Gomez once. I explained that I would document the information into patients chart, but may not be able to advise by 5pm today if they should order this special chair for his recovery.

## 2018-09-20 ENCOUNTER — OFFICE VISIT (OUTPATIENT)
Dept: UROLOGY | Age: 63
End: 2018-09-20

## 2018-09-20 VITALS
OXYGEN SATURATION: 97 % | BODY MASS INDEX: 32.07 KG/M2 | WEIGHT: 224 LBS | HEART RATE: 89 BPM | HEIGHT: 70 IN | SYSTOLIC BLOOD PRESSURE: 132 MMHG | DIASTOLIC BLOOD PRESSURE: 79 MMHG

## 2018-09-20 DIAGNOSIS — C61 PROSTATE CANCER (HCC): Primary | ICD-10-CM

## 2018-09-20 LAB
BILIRUB UR QL STRIP: NEGATIVE
GLUCOSE UR-MCNC: NEGATIVE MG/DL
KETONES P FAST UR STRIP-MCNC: NEGATIVE MG/DL
PH UR STRIP: 5.5 [PH] (ref 4.6–8)
PROT UR QL STRIP: NEGATIVE
SP GR UR STRIP: 1.01 (ref 1–1.03)
UA UROBILINOGEN AMB POC: NORMAL (ref 0.2–1)
URINALYSIS CLARITY POC: CLEAR
URINALYSIS COLOR POC: YELLOW
URINE BLOOD POC: NORMAL
URINE LEUKOCYTES POC: NEGATIVE
URINE NITRITES POC: NEGATIVE

## 2018-09-20 RX ORDER — FUROSEMIDE 20 MG/1
TABLET ORAL DAILY
COMMUNITY
End: 2018-10-08

## 2018-09-20 NOTE — MR AVS SNAPSHOT
[address] [facility] [address] [address] 
[phone] Patient: Bret Borrero MRN: [id number] MWI:[birth year] Visit Information Date & Time Provider Department Dept. Phone Encounter #  
 9/20/2018  3:45 PM Michael Combs, Mary Maize [facility] [phone] [id number] Your Appointments 10/11/2018  8:30 AM  
Follow Up with Merlyn Figueredo PA-C  
[facility] [place] [facility] [place] [facility] Appt Note: 3 M FU LT KNEE  
 [address] [facility], [address] [facility]  
[phone]  
  
   
 [address] [facility], [address] [address]  
  
    
 12/18/2018  3:30 PM  
Follow Up with Jaja Wright MD  
[address] [facility], [address] [facility]) Appt Note: 4 MO F/U  
 [address] [facility] [address]  
[phone]  
  
   
 [address] [facility] [address] Upcoming Health Maintenance Date Due Hepatitis C Screening 1955 ZOSTER VACCINE AGE 60> 2/13/2013 GLAUCOMA SCREENING Q2Y 4/13/2018 Pneumococcal 65+ High/Highest Risk (1 of 2 - PCV13) 4/13/2018 MEDICARE YEARLY EXAM 5/2/2018 Influenza Age 5 to Adult 8/1/2018 DTaP/Tdap/Td series (2 - Td) 12/6/2024 COLONOSCOPY 5/27/2026 Allergies as of 9/20/2018  Review Complete On: 9/20/2018 By: Ayanna Fan LPN Severity Noted Reaction Type Reactions Amoxicillin  07/03/2018    Itching Augmentin [Amoxicillin-pot Clavulanate]    Itching Hibiclens [Chlorhexidine Gluconate]  03/16/2018    Itching Penicillins    Rash Requip [Ropinirole]  05/30/2018    Nausea and Vomiting Current Immunizations  Reviewed on 8/24/2018 Name Date Tdap 12/6/2014 Not reviewed this visit You Were Diagnosed With   
  
 Codes Comments Prostate cancer [facility])    -  Primary ICD-10-CM: W45 ICD-9-CM: 940 Vitals BP Pulse Height(growth percentile) Weight(growth percentile) SpO2 BMI  
 132/79 (BP 1 Location: Right arm, BP Patient Position: Sitting) 89 5' 10\" (1.778 m) 224 lb (101.6 kg) 97% 32.14 kg/m2 Smoking Status Never Smoker BMI and BSA Data Body Mass Index Body Surface Area  
 32.14 kg/m 2 2.24 m 2 Preferred Pharmacy Pharmacy Name Phone [facility] Jameson Wilkinson [address] [place], [place] [phone] Your Updated Medication List  
  
   
This list is accurate as of 9/20/18  4:11 PM.  Always use your most recent med list.  
  
  
  
  
 acetaminophen 500 mg tablet Commonly known as:  TYLENOL Take 1,000 mg by mouth every six (6) hours as needed for Pain. ALPRAZolam 0.5 mg tablet Commonly known as:  Klaus Britt Take one half(1/2) tab to one(1) tab by mouth at bedtime as needed for sleep  
  
 butalbital-acetaminophen-caff -40 mg per capsule Commonly known as:  [employer] Take 2 capsules every 8 hours as needed for headache  
  
 celecoxib 200 mg capsule Commonly known as:  CELEBREX Take 1 Cap by mouth two (2) times a day. clotrimazole-betamethasone topical cream  
Commonly known as:  Amanda Hu Apply twice per day  
  
 diclofenac 1 % Gel Commonly known as:  VOLTAREN Apply 4 g to affected area four (4) times daily. Maximum 16 grams per joint per day. Dispense 5 100 gram tubes  
  
 labetalol 100 mg tablet Commonly known as:  NORMODYNE  
TAKE ONE TABLET BY MOUTH TWICE DAILY  
  
 lansoprazole 30 mg capsule Commonly known as:  PREVACID TAKE 1 CAPSULE DAILY BEFOREBREAKFAST  
  
 LASIX 20 mg tablet Generic drug:  furosemide Take  by mouth daily. lisinopril-hydroCHLOROthiazide 20-12.5 mg per tablet Commonly known as:  PRINZIDE, ZESTORETIC  
TAKE 1 TABLET BY MOUTH DAILY  
  
 metaxalone 800 mg tablet Commonly known as:  SKELAXIN  
 Take 1 Tab by mouth three (3) times daily. Indications: Muscle Spasm  
  
 montelukast 10 mg tablet Commonly known as:  SINGULAIR  
TAKE 1 TABLET BY MOUTH EVERY DAY  
  
 predniSONE 10 mg tablet Commonly known as:  DELTASONE  
3 tabs daily x 3 days, 2 tabs daily x 3 days, 1 tab daily x 3 days, 1/2 tab daily x 3 days  
  
 tamsulosin 0.4 mg capsule Commonly known as:  FLOMAX TAKE 1 CAPSULE BY MOUTH DAILY * warfarin 3 mg tablet Commonly known as:  COUMADIN Or as directed * warfarin 5 mg tablet Commonly known as:  COUMADIN  
TAKE 2 AND 1/2 TABLETS BY MOUTH DAILY OR AS DIRECTED * Notice: This list has 2 medication(s) that are the same as other medications prescribed for you. Read the directions carefully, and ask your doctor or other care provider to review them with you. We Performed the Following AMB POC URINALYSIS DIP STICK AUTO W/O MICRO [52605 CPT(R)] Patient Instructions Prostate-Specific Antigen (PSA) Test: About This Test 
What is it? A prostate-specific antigen (PSA) test measures the amount of PSA in your blood. PSA is released by a man's prostate gland into his blood. A high PSA level may mean that you have an enlargement, infection, or cancer of the prostate. Why is this test done? You may have this test to: · Check for prostate cancer. · Watch prostate cancer and see if treatment is working. How can you prepare for the test? 
Do not ejaculate during the 2 days before your PSA blood test, either during sex or masturbation. What happens before the test? 
Tell your doctor if you have had a: 
· Test to look at your bladder (cystoscopy) in the past several weeks. · Prostate biopsy in the past several weeks. · Prostate infection or urinary tract infection that has not gone away. · Tube (catheter) inserted into your bladder to drain urine recently. What happens during the test? 
A health professional takes a sample of your blood. What happens after the test? 
You can go back to your usual activities right away. When should you call for help? Watch closely for changes in your health, and be sure to contact your doctor if you have any questions about this test. 
Follow-up care is a key part of your treatment and safety. Be sure to make and go to all appointments, and call your doctor if you are having problems. It's also a good idea to keep a list of the medicines you take. Ask your doctor when you can expect to have your test results. Where can you learn more? Go to http://rivka-jarrell.info/. Enter B821 in the search box to learn more about \"Prostate-Specific Antigen (PSA) Test: About This Test.\" Current as of: May 12, 2017 Content Version: 11.7 © 2040-9164 [employer]. Care instructions adapted under license by [employer] (which disclaims liability or warranty for this information). If you have questions about a medical condition or this instruction, always ask your healthcare professional. [address] any warranty or liability for your use of this information. Please provide this summary of care documentation to your next provider. Your primary care clinician is listed as Nguyễn Quintanilla. If you have any questions after today's visit, please call [phone].

## 2018-09-20 NOTE — PATIENT INSTRUCTIONS
Prostate-Specific Antigen (PSA) Test: About This Test  What is it? A prostate-specific antigen (PSA) test measures the amount of PSA in your blood. PSA is released by a man's prostate gland into his blood. A high PSA level may mean that you have an enlargement, infection, or cancer of the prostate. Why is this test done? You may have this test to:  · Check for prostate cancer. · Watch prostate cancer and see if treatment is working. How can you prepare for the test?  Do not ejaculate during the 2 days before your PSA blood test, either during sex or masturbation. What happens before the test?  Tell your doctor if you have had a:  · Test to look at your bladder (cystoscopy) in the past several weeks. · Prostate biopsy in the past several weeks. · Prostate infection or urinary tract infection that has not gone away. · Tube (catheter) inserted into your bladder to drain urine recently. What happens during the test?  A health professional takes a sample of your blood. What happens after the test?  You can go back to your usual activities right away. When should you call for help? Watch closely for changes in your health, and be sure to contact your doctor if you have any questions about this test.  Follow-up care is a key part of your treatment and safety. Be sure to make and go to all appointments, and call your doctor if you are having problems. It's also a good idea to keep a list of the medicines you take. Ask your doctor when you can expect to have your test results. Where can you learn more? Go to http://rivka-jarrell.info/. Enter N261 in the search box to learn more about \"Prostate-Specific Antigen (PSA) Test: About This Test.\"  Current as of: May 12, 2017  Content Version: 11.7  © 9571-1560 [employer]. Care instructions adapted under license by [employer] (which disclaims liability or warranty for this information).  If you have questions about a medical condition or this instruction, always ask your healthcare professional. [place] [address] any warranty or liability for your use of this information.

## 2018-09-20 NOTE — PROGRESS NOTES
MrZoe Katherine Helotes has a reminder for a \"due or due soon\" health maintenance. I have asked that he contact his primary care provider for follow-up on this health maintenance.

## 2018-09-21 NOTE — PROGRESS NOTES
Tor Krissy Winters 70 y.o. male                                            Prostate Carcinoma [place] 7 Histology/PSA 5.3     Bruno Garza seen today for prostate carcinoma follow-up here today for review of bone scan and CT scan imaging metastatic assessment     history of symptomatic BPH responding favorably to alpha-blocker therapy with Flomax   Family history significant for prostate carcinoma developing in father at age 61 and older brother developing prostate cancer at age 54      PSA 5.3 in May 2018           transperineal prostate biopsy on 15 August 2018 prostate volume 35 cc PSA density 0.15 [place] 7 adenocarcinoma bilaterally - 8 of 12 cores positive    Bone scan on 29 August 2020 18- for metastatic disease-     CT scan imaging of the abdomen and pelvis on 29 August 2018 reveals a cyst in the left kidney with no evidence of pathologic lymphadenopathy         Review of Systems:   CNS: No seizure syncope headaches dizziness or visual changes  Respiratory: Chronic cough-no chest pain no wheezing - no shortness of breath  Cardiovascular: No angina no palpitations-DVT on Coumadin anticoagulation   intestinal: Dyspepsia diarrhea or constipation urinary: Irritative and obstructive voiding symptoms responding favorably to alpha-blocker therapy  Skeletal: Chronic low back pain  Endocrine: No diabetes or thyroid disease  Other:     Allergies: Allergies   Allergen Reactions    Amoxicillin Itching    Augmentin [Amoxicillin-Pot Clavulanate] Itching    Hibiclens [Chlorhexidine Gluconate] Itching    Penicillins Rash    Requip [Ropinirole] Nausea and Vomiting      Medications:    Current Outpatient Prescriptions   Medication Sig Dispense Refill    furosemide (LASIX) 20 mg tablet Take  by mouth daily.       tamsulosin (FLOMAX) 0.4 mg capsule TAKE 1 CAPSULE BY MOUTH DAILY 30 Cap 0    ALPRAZolam (XANAX) 0.5 mg tablet Take one half(1/2) tab to one(1) tab by mouth at bedtime as needed for sleep 30 Tab 0    predniSONE (DELTASONE) 10 mg tablet 3 tabs daily x 3 days, 2 tabs daily x 3 days, 1 tab daily x 3 days, 1/2 tab daily x 3 days 20 Tab 0    lansoprazole (PREVACID) 30 mg capsule TAKE 1 CAPSULE DAILY BEFOREBREAKFAST 90 Cap 3    warfarin (COUMADIN) 5 mg tablet TAKE 2 AND 1/2 TABLETS BY MOUTH DAILY OR AS DIRECTED 75 Tab 0    celecoxib (CELEBREX) 200 mg capsule Take 1 Cap by mouth two (2) times a day. (Patient taking differently: Take 200 mg by mouth daily. ) 180 Cap 0    lisinopril-hydroCHLOROthiazide (PRINZIDE, ZESTORETIC) 20-12.5 mg per tablet TAKE 1 TABLET BY MOUTH DAILY 90 Tab 0    diclofenac (VOLTAREN) 1 % gel Apply 4 g to affected area four (4) times daily. Maximum 16 grams per joint per day. Dispense 5 100 gram tubes 5 Each 0    clotrimazole-betamethasone (LOTRISONE) topical cream Apply twice per day 45 g 1    warfarin (COUMADIN) 3 mg tablet Or as directed 30 Tab 3    labetalol (NORMODYNE) 100 mg tablet TAKE ONE TABLET BY MOUTH TWICE DAILY (Patient taking differently: TAKE ONE TABLET BY MOUTH DAILY) 180 Tab 0    metaxalone (SKELAXIN) 800 mg tablet Take 1 Tab by mouth three (3) times daily. Indications: Muscle Spasm (Patient taking differently: Take 800 mg by mouth three (3) times daily as needed. Indications: Muscle Spasm) 90 Tab 2    montelukast (SINGULAIR) 10 mg tablet TAKE 1 TABLET BY MOUTH EVERY DAY (Patient taking differently: TAKE 1 TABLET BY MOUTH EVERY HS) 90 Tab 0    acetaminophen (TYLENOL) 500 mg tablet Take 1,000 mg by mouth every six (6) hours as needed for Pain.       butalbital-acetaminophen-caff (FIORICET) -40 mg per capsule Take 2 capsules every 8 hours as needed for headache 126 Cap 1       Past Medical History:   Diagnosis Date    Arthritis     Bleeding     Blood clot in the legs     Chronic pain     knee and shoulder    Esophageal reflux     GERD (gastroesophageal reflux disease)     Headache(784.0)     migraine    High cholesterol     Hypertension     Lower back pain 11/6/2010  Other chest pain     Personal history of venous thrombosis and embolism     Pure hypercholesterolemia     Right buttock pain 11/6/2010    Right foot pain     Rotator cuff tear     left-since 2010, worsened tear Young.    Spinal stenosis     Tendonitis, tibialis     anterior    Thromboembolus ([address]     3 after sx last one 2000      Past Surgical History:   Procedure Laterality Date    FOOT/TOES SURGERY PROC UNLISTED      HX BACK SURGERY      HX ORTHOPAEDIC  06-25-12    Right foot with excision of bursa and adipose tissue from fifth metatarsal base by Dr. Beryle Breed      lower back (1992 and 2000)    HX OTHER SURGICAL      left foot (2008)    LAMINOTOMY      NERVE BLOCK       Social History     Social History    Marital status:      Spouse name: N/A    Number of children: N/A    Years of education: N/A     Occupational History    Not on file.      Social History Main Topics    Smoking status: Never Smoker    Smokeless tobacco: Never Used    Alcohol use No    Drug use: No    Sexual activity: Not Currently     Other Topics Concern    Not on file     Social History Narrative      Family History   Problem Relation Age of Onset   Celestino Arthritis-rheumatoid Mother     Dementia Mother     Heart Disease Father     Cancer Father     Hypertension Other         Physical Examination: Well-nourished mature male in no apparent distress    Urinalysis: Negative dipstick/nitrite negative    Impression: [place] 7 prostate carcinoma/PSA 5.3/negative bone scan/negative CT scan of abdomen pelvis    Plan: Definitive treatment options described and discussed including cryotherapy, laparoscopic prostatectomy, and radiation therapy including external beam, brachytherapy, and proton Beam    Referred to radiation oncology-Dr. Latham for evaluation and consideration as a candidate for definitive radiation therapy  Referred to Urologic Oncology-Dr. Lynne Hemphill evaluation and consideration as a candidate for definitive surgery or cryotherapy    Rationale for fiducial marker placement, spacer gel implantation and short-term androgen ablation therapy as adjuncts to radiation treatment also described discussed      rtc 4 weeks      More than 1/2 of this 25 minute visit was spent in counselling and coordination of care, as described above. Carlton Hoffmann MD  -electronically signed-    PLEASE NOTE:  This document has been produced using voice recognition software. Unrecognized errors in transcription may be present.

## 2018-09-25 ENCOUNTER — DOCUMENTATION ONLY (OUTPATIENT)
Dept: UROLOGY | Age: 63
End: 2018-09-25

## 2018-10-03 RX ORDER — TAMSULOSIN HYDROCHLORIDE 0.4 MG/1
CAPSULE ORAL
Qty: 30 CAP | Refills: 0 | Status: SHIPPED | OUTPATIENT
Start: 2018-10-03 | End: 2018-10-30 | Stop reason: SDUPTHER

## 2018-10-03 RX ORDER — LABETALOL 100 MG/1
TABLET, FILM COATED ORAL
Qty: 180 TAB | Refills: 0 | Status: SHIPPED | OUTPATIENT
Start: 2018-10-03 | End: 2019-03-27 | Stop reason: SDUPTHER

## 2018-10-08 ENCOUNTER — OFFICE VISIT (OUTPATIENT)
Dept: ORTHOPEDIC SURGERY | Facility: CLINIC | Age: 63
End: 2018-10-08

## 2018-10-08 VITALS
SYSTOLIC BLOOD PRESSURE: 148 MMHG | DIASTOLIC BLOOD PRESSURE: 73 MMHG | TEMPERATURE: 96.8 F | WEIGHT: 225 LBS | RESPIRATION RATE: 16 BRPM | OXYGEN SATURATION: 99 % | HEIGHT: 70 IN | HEART RATE: 84 BPM | BODY MASS INDEX: 32.21 KG/M2

## 2018-10-08 DIAGNOSIS — M25.511 RIGHT SHOULDER PAIN, UNSPECIFIED CHRONICITY: ICD-10-CM

## 2018-10-08 DIAGNOSIS — M75.101 ROTATOR CUFF SYNDROME OF RIGHT SHOULDER: Primary | ICD-10-CM

## 2018-10-08 PROBLEM — C61 PROSTATE CANCER (HCC): Status: ACTIVE | Noted: 2018-10-08

## 2018-10-08 RX ORDER — TRIAMCINOLONE ACETONIDE 40 MG/ML
40 INJECTION, SUSPENSION INTRA-ARTICULAR; INTRAMUSCULAR ONCE
Qty: 1 ML | Refills: 0
Start: 2018-10-08 | End: 2018-10-08

## 2018-10-08 RX ORDER — RANITIDINE 150 MG/1
TABLET, FILM COATED ORAL
Refills: 1 | COMMUNITY
Start: 2018-09-27 | End: 2019-10-02

## 2018-10-08 NOTE — PROGRESS NOTES
Patient: Jacob Montoya                MRN: [id number]       SSN: [id number]  YOB: 1955        AGE: 70 y.o. SEX: male  Body mass index is 32.28 kg/(m^2). PCP: Delliah East MD  10/08/18    Chief Complaint: Right shoulder pain    HISTORY OF PRESENT ILLNESS:  Malcolm Miles is a 78-year-old male who comes in to the office today for his right shoulder. He has had right shoulder pain for the past few months, and it has worsened recently. He recently underwent cataract surgery and had to lie prone and then on his right side for over a week after the surgery, and that caused his shoulder pain to worsen. He has been treated in the past for his right shoulder. She says probably eight or nine years ago, he got an injection into the right shoulder, which helped his pain. He was told at that time that he may have some spurs and a rotator cuff tear.   More recently, he has been having pain that is deep in the shoulder. He has difficulty with raising his arm above his head due to pain. He rates his pain as 8/10 with movement. He is not currently taking any medications for this. He has had some health problems and is set to undergo a prostate surgery coming up in the near future. He is not interested in any surgery on his right shoulder at todays visit but possibly in the future.           Past Medical History:   Diagnosis Date    Arthritis     Bleeding     Blood clot in the legs     Chronic pain     knee and shoulder    Esophageal reflux     GERD (gastroesophageal reflux disease)     Headache(784.0)     migraine    High cholesterol     Hypertension     Lower back pain 11/6/2010    Other chest pain     Personal history of venous thrombosis and embolism     Pure hypercholesterolemia     Right buttock pain 11/6/2010    Right foot pain     Rotator cuff tear     left-since 2010, worsened tear Young.    Spinal stenosis     Tendonitis, tibialis     anterior    Thromboembolus ([address] 3 after sx last one 2000       Family History   Problem Relation Age of Onset   [address] [facility] Arthritis-rheumatoid Mother     Dementia Mother     Heart Disease Father     Cancer Father     Hypertension Other        Current Outpatient Prescriptions   Medication Sig Dispense Refill    raNITIdine (ZANTAC) 150 mg tablet TK 1 T PO HS  1    triamcinolone acetonide (KENALOG) 40 mg/mL injection 1 mL by Intra artICUlar route once for 1 dose. 1 mL 0    tamsulosin (FLOMAX) 0.4 mg capsule TAKE 1 CAPSULE BY MOUTH DAILY 30 Cap 0    labetalol (NORMODYNE) 100 mg tablet TAKE ONE TABLET BY MOUTH TWICE DAILY 180 Tab 0    ALPRAZolam (XANAX) 0.5 mg tablet Take one half(1/2) tab to one(1) tab by mouth at bedtime as needed for sleep 30 Tab 0    lansoprazole (PREVACID) 30 mg capsule TAKE 1 CAPSULE DAILY BEFOREBREAKFAST 90 Cap 3    warfarin (COUMADIN) 5 mg tablet TAKE 2 AND 1/2 TABLETS BY MOUTH DAILY OR AS DIRECTED 75 Tab 0    celecoxib (CELEBREX) 200 mg capsule Take 1 Cap by mouth two (2) times a day. (Patient taking differently: Take 200 mg by mouth daily. ) 180 Cap 0    butalbital-acetaminophen-caff (FIORICET) -40 mg per capsule Take 2 capsules every 8 hours as needed for headache 126 Cap 1    lisinopril-hydroCHLOROthiazide (PRINZIDE, ZESTORETIC) 20-12.5 mg per tablet TAKE 1 TABLET BY MOUTH DAILY 90 Tab 0    diclofenac (VOLTAREN) 1 % gel Apply 4 g to affected area four (4) times daily. Maximum 16 grams per joint per day. Dispense 5 100 gram tubes 5 Each 0    warfarin (COUMADIN) 3 mg tablet Or as directed 30 Tab 3    montelukast (SINGULAIR) 10 mg tablet TAKE 1 TABLET BY MOUTH EVERY DAY (Patient taking differently: TAKE 1 TABLET BY MOUTH EVERY HS) 90 Tab 0    acetaminophen (TYLENOL) 500 mg tablet Take 1,000 mg by mouth every six (6) hours as needed for Pain.  furosemide (LASIX) 20 mg tablet Take  by mouth daily.       predniSONE (DELTASONE) 10 mg tablet 3 tabs daily x 3 days, 2 tabs daily x 3 days, 1 tab daily x 3 days, 1/2 tab daily x 3 days 20 Tab 0    clotrimazole-betamethasone (LOTRISONE) topical cream Apply twice per day 45 g 1    metaxalone (SKELAXIN) 800 mg tablet Take 1 Tab by mouth three (3) times daily. Indications: Muscle Spasm (Patient taking differently: Take 800 mg by mouth three (3) times daily as needed. Indications: Muscle Spasm) 90 Tab 2       Allergies   Allergen Reactions    Amoxicillin Itching    Augmentin [Amoxicillin-Pot Clavulanate] Itching    Hibiclens [Chlorhexidine Gluconate] Itching    Penicillins Rash    Requip [Ropinirole] Nausea and Vomiting       Past Surgical History:   Procedure Laterality Date    FOOT/TOES SURGERY PROC UNLISTED      HX BACK SURGERY      HX ORTHOPAEDIC  06-25-12    Right foot with excision of bursa and adipose tissue from fifth metatarsal base by Dr. Briana Gerardo      lower back (1992 and 2000)    HX OTHER SURGICAL      left foot (2008)    LAMINOTOMY      NERVE BLOCK         Social History     Social History    Marital status:      Spouse name: N/A    Number of children: N/A    Years of education: N/A     Occupational History    Not on file. Social History Main Topics    Smoking status: Never Smoker    Smokeless tobacco: Never Used    Alcohol use No    Drug use: No    Sexual activity: Not Currently     Other Topics Concern    Not on file     Social History Narrative       REVIEW OF SYSTEMS:      CON: negative for recent weight loss/gain, fever, or chills  EYE: negative for double or blurry vision  ENT: negative for hoarseness  RS:   negative for cough, URI, SOB  CV:  negative for chest pain, palpitations  GI:    negative for blood in stool, nausea/vomiting  :  negative for blood in urine  MS: As per HPI  Other systems reviewed and noted below.     PHYSICAL EXAMINATION:  Visit Vitals    /73 (BP 1 Location: Left arm, BP Patient Position: Sitting)    Pulse 84    Temp 96.8 °F (36 °C) (Oral)    Resp 16    Ht 5' 10\" (1.778 m)  Wt 225 lb (102.1 kg)    SpO2 99%    BMI 32.28 kg/m2     Body mass index is 32.28 kg/(m^2). GENERAL: Alert and oriented x3, in no acute distress, well-developed, well-nourished. HEENT: Normocephalic, atraumatic. RESP: Non labored breathing with equal chest rise on inspiration. CV: Well perfused extremities. No cyanosis or clubbing noted. ABDOMEN: Soft, non-tender, non-distended. Physical exam of the right shoulder reveals some limited motion at the extremes of motion due to pain. He has painful forward flexion, as well as painful external rotation and internal rotation. Forward flexion is to 160°, external rotation is 30°, and internal rotation is to the lower lumbar spine. There is pain and weakness with supraspinatus and infraspinatus testing. There is mild pain with belly press testing. There is mild pain with biceps testing, as well as weakness. He is nontender over the [facility] joint. He is tender to palpation over the proximal humerus. He is neurovascularly intact distally. There is no appreciated instability on exam.     IMAGING:  X-rays of the right shoulder were taken in the office today, four views. These show sclerosis of the greater tuberosity, as well as a decreased acromiohumeral interval and some sclerosis on the undersurface of the acromion. ASSESSMENT/PLAN:  Gideon Rice is a 40-year-old male with right shoulder pain likely coming from his rotator cuff. I suspect he does have a tear of his rotator cuff. Unfortunately, at this time, it is not a good time for him to consider any surgery. He would like some pain relief, as he has some events coming up in his life in the near future. In order to try to help him through this time and get him some pain relief, we discussed an injection into the shoulder. He was agreeable to this.   Therefore, after discussing the risks and benefits of the procedure and obtaining informed consent, the right shoulder was injected with Lidocaine and steroid in the subacromial space. He tolerated this well. I will see him back most likely in three or four months. If he has any problems, I will see him back sooner.       [facility] - [facility]  OFFICE PROCEDURE PROGRESS NOTE        Chart reviewed for the following:   Fátima Escalante MD, have reviewed the History, Physical and updated the Allergic reactions for [address] [place] [address] performed immediately prior to start of procedure:   Fátima Escalante MD, have performed the following [facility] to the start of the procedure:            * Patient was identified by name and date of birth   * Agreement on procedure being performed was verified  * Risks and Benefits explained to the patient  * Procedure site verified and marked as necessary  * Patient was positioned for comfort  * Consent was signed and verified    Time: 7282       Date of procedure: 10/8/2018    Procedure performed by:  Chandni Valadez MD    Provider assisted by: Melissa Mckeon LPN    Patient assisted by: self    How tolerated by patient: tolerated the procedure well with no complications    Post Procedural Pain Scale: 0 - No Hurt    Comments: none                  Electronically signed by: Chandni Valadez MD

## 2018-10-11 ENCOUNTER — OFFICE VISIT (OUTPATIENT)
Dept: ORTHOPEDIC SURGERY | Facility: CLINIC | Age: 63
End: 2018-10-11

## 2018-10-11 DIAGNOSIS — M76.32 ILIOTIBIAL BAND TENDINITIS OF LEFT SIDE: ICD-10-CM

## 2018-10-11 DIAGNOSIS — Z96.652 STATUS POST TOTAL LEFT KNEE REPLACEMENT: Primary | ICD-10-CM

## 2018-10-11 RX ORDER — DICLOFENAC SODIUM 10 MG/G
4 GEL TOPICAL 4 TIMES DAILY
Qty: 5 EACH | Refills: 0 | Status: SHIPPED | OUTPATIENT
Start: 2018-10-11 | End: 2019-04-26 | Stop reason: SDUPTHER

## 2018-10-11 NOTE — PROGRESS NOTES
[address] [facility], [address] [facility]  [phone]           Patient: Carolina Lopez                MRN: [id number]       SSN: [id number]  YOB: 1955        AGE: 70 y.o. SEX: male  There is no height or weight on file to calculate BMI. PCP: Raquel Nieves MD  10/11/18      This office note has been dictated. REVIEW OF SYSTEMS:  Constitutional: Negative for fever, chills, weight loss and malaise/fatigue. HENT: Negative. Eyes: Negative. Respiratory: Negative. Cardiovascular: Negative. Gastrointestinal: No bowel incontinence or constipation. Genitourinary: No bladder incontinence or saddle anesthesia. Skin: Negative. Neurological: Negative. Endo/Heme/Allergies: Negative. Psychiatric/Behavioral: Negative. Musculoskeletal: As per HPI above. Past Medical History:   Diagnosis Date    Arthritis     Bleeding     Blood clot in the legs     Chronic pain     knee and shoulder    Esophageal reflux     GERD (gastroesophageal reflux disease)     Headache(784.0)     migraine    High cholesterol     Hypertension     Lower back pain 11/6/2010    Other chest pain     Personal history of venous thrombosis and embolism     Pure hypercholesterolemia     Right buttock pain 11/6/2010    Right foot pain     Rotator cuff tear     left-since 2010, worsened tear Young.    Spinal stenosis     Tendonitis, tibialis     anterior    Thromboembolus ([facility] [address]     3 after sx last one 2000    Total knee replacement status, left          Current Outpatient Prescriptions:     diclofenac (VOLTAREN) 1 % gel, Apply 4 g to affected area four (4) times daily. Maximum 16 grams per joint per day.  Dispense 5 100 gram tubes, Disp: 5 Each, Rfl: 0    raNITIdine (ZANTAC) 150 mg tablet, TK 1 T PO HS, Disp: , Rfl: 1    tamsulosin (FLOMAX) 0.4 mg capsule, TAKE 1 CAPSULE BY MOUTH DAILY, Disp: 30 Cap, Rfl: 0    labetalol (NORMODYNE) 100 mg tablet, TAKE ONE TABLET BY MOUTH TWICE DAILY, Disp: 180 Tab, Rfl: 0    ALPRAZolam (XANAX) 0.5 mg tablet, Take one half(1/2) tab to one(1) tab by mouth at bedtime as needed for sleep, Disp: 30 Tab, Rfl: 0    lansoprazole (PREVACID) 30 mg capsule, TAKE 1 CAPSULE DAILY BEFOREBREAKFAST, Disp: 90 Cap, Rfl: 3    warfarin (COUMADIN) 5 mg tablet, TAKE 2 AND 1/2 TABLETS BY MOUTH DAILY OR AS DIRECTED, Disp: 75 Tab, Rfl: 0    celecoxib (CELEBREX) 200 mg capsule, Take 1 Cap by mouth two (2) times a day. (Patient taking differently: Take 200 mg by mouth daily.), Disp: 180 Cap, Rfl: 0    butalbital-acetaminophen-caff (FIORICET) -40 mg per capsule, Take 2 capsules every 8 hours as needed for headache, Disp: 126 Cap, Rfl: 1    lisinopril-hydroCHLOROthiazide (PRINZIDE, ZESTORETIC) 20-12.5 mg per tablet, TAKE 1 TABLET BY MOUTH DAILY, Disp: 90 Tab, Rfl: 0    warfarin (COUMADIN) 3 mg tablet, Or as directed, Disp: 30 Tab, Rfl: 3    metaxalone (SKELAXIN) 800 mg tablet, Take 1 Tab by mouth three (3) times daily. Indications: Muscle Spasm (Patient taking differently: Take 800 mg by mouth three (3) times daily as needed. Indications: Muscle Spasm), Disp: 90 Tab, Rfl: 2    montelukast (SINGULAIR) 10 mg tablet, TAKE 1 TABLET BY MOUTH EVERY DAY (Patient taking differently: TAKE 1 TABLET BY MOUTH EVERY HS), Disp: 90 Tab, Rfl: 0    acetaminophen (TYLENOL) 500 mg tablet, Take 1,000 mg by mouth every six (6) hours as needed for Pain., Disp: , Rfl:     Allergies   Allergen Reactions    Amoxicillin Itching    Augmentin [Amoxicillin-Pot Clavulanate] Itching    Hibiclens [Chlorhexidine Gluconate] Itching    Penicillins Rash    Requip [Ropinirole] Nausea and Vomiting       Social History     Social History    Marital status:      Spouse name: N/A    Number of children: N/A    Years of education: N/A     Occupational History    Not on file.      Social History Main Topics    Smoking status: Never Smoker    Smokeless tobacco: Never Used    Alcohol use No    Drug use: No    Sexual activity: Not Currently     Other Topics Concern    Not on file     Social History Narrative       Past Surgical History:   Procedure Laterality Date    FOOT/TOES SURGERY PROC UNLISTED      HX BACK SURGERY      HX KNEE REPLACEMENT Left     HX ORTHOPAEDIC  06-25-12    Right foot with excision of bursa and adipose tissue from fifth metatarsal base by Dr. Makenzie Birch      lower back (1992 and 2000)    HX OTHER SURGICAL      left foot (2008)    HX OTHER SURGICAL      Retina repair     LAMINOTOMY      NERVE BLOCK                 We did see Mr. Tano Gunter today in the office for followup with regards to his left knee replacement. The patient is now about six months status post surgery and doing extremely well. He is quite happy with the results of the knee replacement. There is just a little stiffness after being seated and a little stiffness with the first couple of steps, which calms down. He denies any start-up pain. He has no feelings of instability. He has had no recent fevers, chills, systemic changes, or injuries to report, with the exception of some recent eye surgery, which went well. He does report he has some upcoming prostate surgery. PHYSICAL EXAMINATION:  In general, the patient is alert and oriented x 3 in no acute distress. The patient is well-developed, well-nourished, with a normal affect. The patient is afebrile. HEENT:  Head is normocephalic and atraumatic. Pupils are equally round and reactive to light and accommodation. Extraocular eye movements are intact. Neck is supple. Trachea is midline. No JVD is present. Breathing is nonlabored. Examination of the lower extremities reveals pain-free range of motion of the hips. There is no pain to palpation of the greater trochanteric bursae. There is negative straight leg raise. There is negative calf tenderness. There is negative Giorgios.   There is no evidence of DVT present. The left knee reveals the skin is intact. The surgical wounds are healed nicely. There is no erythema, no ecchymosis, no warmth, and no signs of infection or cellulitis present. He has full range of motion, very good stability, and the patella tracks nicely without rubs or crepitus noted. There is no pain to palpation about the knee. ASSESSMENT:  Status post left knee replacement doing well. PLAN:  At this point, the patient is doing quite well with his knee replacement. He will continue with activities as tolerated. I have asked him to continue with range of motion activities, as well as strengthening exercises. He will probably benefit from a little bit more quadriceps strengthening. Exercises were discussed with the patient today. We will send a prescription for Voltaren Gel to the pharmacy for him and plan on seeing him back in six months time for evaluation. He will call with any questions or concerns that shall arise.              JR Willie GATES, PAChantelC, ATC

## 2018-10-16 ENCOUNTER — OFFICE VISIT (OUTPATIENT)
Dept: ORTHOPEDIC SURGERY | Facility: CLINIC | Age: 63
End: 2018-10-16

## 2018-10-16 ENCOUNTER — ANTI-COAG VISIT (OUTPATIENT)
Dept: INTERNAL MEDICINE CLINIC | Age: 63
End: 2018-10-16

## 2018-10-16 VITALS
OXYGEN SATURATION: 97 % | WEIGHT: 221 LBS | BODY MASS INDEX: 31.64 KG/M2 | TEMPERATURE: 96.1 F | SYSTOLIC BLOOD PRESSURE: 133 MMHG | DIASTOLIC BLOOD PRESSURE: 81 MMHG | RESPIRATION RATE: 16 BRPM | HEIGHT: 70 IN | HEART RATE: 80 BPM

## 2018-10-16 DIAGNOSIS — Z86.718 PERSONAL HISTORY OF VENOUS THROMBOSIS AND EMBOLISM: ICD-10-CM

## 2018-10-16 DIAGNOSIS — M75.101 TEAR OF RIGHT ROTATOR CUFF, UNSPECIFIED TEAR EXTENT: Primary | ICD-10-CM

## 2018-10-16 DIAGNOSIS — Z79.01 WARFARIN ANTICOAGULATION: ICD-10-CM

## 2018-10-16 LAB
INR BLD: 1.6
PT POC: NORMAL SECONDS
VALID INTERNAL CONTROL?: YES

## 2018-10-16 RX ORDER — PREDNISONE 10 MG/1
TABLET ORAL
Refills: 0 | COMMUNITY
Start: 2018-08-31 | End: 2018-11-07

## 2018-10-16 NOTE — PROGRESS NOTES
Patient: Jacob Montoya                MRN: [id number]       SSN: [id number]  YOB: 1955        AGE: 70 y.o. SEX: male  Body mass index is 31.71 kg/(m^2). PCP: Delilah East MD  10/16/18    Chief Complaint: Right shoulder pain    HISTORY OF PRESENT ILLNESS:  Malcolm Miles returns to the office today for his right shoulder. He was doing well. Unfortunately, he lost his balance the other day and reached out with his right arm, and he felt a pop and pain. Since that time, he has been unable to raise his right arm to shoulder level due to weakness and pain. He also has some stiffness. He has not had any imaging since the injury. Past Medical History:   Diagnosis Date    Arthritis     Bleeding     Blood clot in the legs     Chronic pain     knee and shoulder    Esophageal reflux     GERD (gastroesophageal reflux disease)     Headache(784.0)     migraine    High cholesterol     Hypertension     Lower back pain 11/6/2010    Other chest pain     Personal history of venous thrombosis and embolism     Pure hypercholesterolemia     Right buttock pain 11/6/2010    Right foot pain     Rotator cuff tear     left-since 2010, worsened tear Jan.    Spinal stenosis     Tendonitis, tibialis     anterior    Thromboembolus ([facility] [address]     3 after sx last one 2000    Total knee replacement status, left        Family History   Problem Relation Age of Onset   [address] [facility] Arthritis-rheumatoid Mother     Dementia Mother     Heart Disease Father     Cancer Father     Hypertension Other     Cancer Brother        Current Outpatient Prescriptions   Medication Sig Dispense Refill    diclofenac (VOLTAREN) 1 % gel Apply 4 g to affected area four (4) times daily. Maximum 16 grams per joint per day.  Dispense 5 100 gram tubes 5 Each 0    raNITIdine (ZANTAC) 150 mg tablet TK 1 T PO HS  1    tamsulosin (FLOMAX) 0.4 mg capsule TAKE 1 CAPSULE BY MOUTH DAILY 30 Cap 0    labetalol (NORMODYNE) 100 mg tablet TAKE ONE TABLET BY MOUTH TWICE DAILY 180 Tab 0    ALPRAZolam (XANAX) 0.5 mg tablet Take one half(1/2) tab to one(1) tab by mouth at bedtime as needed for sleep 30 Tab 0    lansoprazole (PREVACID) 30 mg capsule TAKE 1 CAPSULE DAILY BEFOREBREAKFAST 90 Cap 3    warfarin (COUMADIN) 5 mg tablet TAKE 2 AND 1/2 TABLETS BY MOUTH DAILY OR AS DIRECTED 75 Tab 0    celecoxib (CELEBREX) 200 mg capsule Take 1 Cap by mouth two (2) times a day. (Patient taking differently: Take 200 mg by mouth daily. ) 180 Cap 0    butalbital-acetaminophen-caff (FIORICET) -40 mg per capsule Take 2 capsules every 8 hours as needed for headache 126 Cap 1    lisinopril-hydroCHLOROthiazide (PRINZIDE, ZESTORETIC) 20-12.5 mg per tablet TAKE 1 TABLET BY MOUTH DAILY 90 Tab 0    warfarin (COUMADIN) 3 mg tablet Or as directed 30 Tab 3    metaxalone (SKELAXIN) 800 mg tablet Take 1 Tab by mouth three (3) times daily. Indications: Muscle Spasm (Patient taking differently: Take 800 mg by mouth three (3) times daily as needed. Indications: Muscle Spasm) 90 Tab 2    montelukast (SINGULAIR) 10 mg tablet TAKE 1 TABLET BY MOUTH EVERY DAY (Patient taking differently: TAKE 1 TABLET BY MOUTH EVERY HS) 90 Tab 0    acetaminophen (TYLENOL) 500 mg tablet Take 1,000 mg by mouth every six (6) hours as needed for Pain.       predniSONE (DELTASONE) 10 mg tablet   0       Allergies   Allergen Reactions    Amoxicillin Itching    Augmentin [Amoxicillin-Pot Clavulanate] Itching    Hibiclens [Chlorhexidine Gluconate] Itching    Penicillins Rash    Requip [Ropinirole] Nausea and Vomiting       Past Surgical History:   Procedure Laterality Date    FOOT/TOES SURGERY PROC UNLISTED      HX BACK SURGERY      HX KNEE REPLACEMENT Left     HX ORTHOPAEDIC  06-25-12    Right foot with excision of bursa and adipose tissue from fifth metatarsal base by Dr. Nati Mckoy      lower back (1992 and 2000)    HX OTHER SURGICAL      left foot (2008)    HX OTHER SURGICAL      Retina repair     LAMINOTOMY      NERVE BLOCK         Social History     Social History    Marital status:      Spouse name: N/A    Number of children: N/A    Years of education: N/A     Occupational History    Not on file. Social History Main Topics    Smoking status: Never Smoker    Smokeless tobacco: Never Used    Alcohol use No    Drug use: No    Sexual activity: Not Currently     Other Topics Concern    Not on file     Social History Narrative       REVIEW OF SYSTEMS:      No changes from previous review of systems unless noted. PHYSICAL EXAMINATION:  Visit Vitals    /81 (BP 1 Location: Left arm, BP Patient Position: Sitting)    Pulse 80    Temp 96.1 °F (35.6 °C) (Oral)    Resp 16    Ht 5' 10\" (1.778 m)    Wt 221 lb (100.2 kg)    SpO2 97%    BMI 31.71 kg/m2     Body mass index is 31.71 kg/(m^2). GENERAL: Alert and oriented x3, in no acute distress. HEENT: Normocephalic, atraumatic. RESP: Non labored breathing. SKIN: No rashes or lesions noted. The right shoulder has no obvious deformity. There is no swelling. He has crepitus with passive range of motion. He has passive forward flexion to 90°. He is able to hold it there briefly but is weak. He has pain with supraspinatus and infraspinatus testing and pain with biceps stress testing. He is nontender over the [facility] joint. There is mild pain with belly press testing. He is neurovascularly intact distally. External rotation is 20° with the arm at the side. Internal rotation is to the hip pocket. ASSESSMENT/PLAN:  Mee Diggs is a 15-year-old male with a traumatic right shoulder injury. He felt pain and has had difficulty with a raising his arm since he had this pain and popping sensation in his shoulder. I would like for him to get an MRI to further evaluate the rotator cuff. I will see him back following completion of the MRI to discuss the findings.             Electronically signed by: Dave Brown MD

## 2018-10-16 NOTE — PROGRESS NOTES
Mr. Laurie Kahn is here today for anticoagulation monitoring for the diagnosis of Atrial Fibrillation. His INR goal is 2.0-3.0 and his current Coumadin dose is 8mg Monday, Wednesday, Friday 10mg all other days . Today's findings include an INR of 1.6 (normal INR range 0.8-1.2) . Considering Mr. Benedict Maynard past history, todays findings, and per the coumadin policy/protocol, Mr. Rae Ray was instructed to take Coumadin as follows,  8mg Monday, Friday 10mg all other days . Elieser Roy He was also instructed to schedule an appointment in 3 weeks prior to leaving for an INR check. A full discussion of the nature of anticoagulants has been carried out. A full discussion of the need for frequent and regular monitoring, precise dosage adjustment and compliance was stressed. Side effects of potential bleeding were discussed and Mr. Rae Ray was instructed to call [phone] if there are any signs of abnormal bleeding. Mr. Rae Ray was instructed to avoid any OTC items containing aspirin or ibuprofen and prior to starting any new OTC products to consult with his physician or pharmacist to ensure no drug interactions are present. Mr. Rae Ray was instructed to avoid any major changes in his general diet and to avoid alcohol consumption. .    .    Mr. Rae Ray verbalized his understanding of all instructions and will call the office with any questions, concerns, or signs of abnormal bleeding or blood clot.

## 2018-10-16 NOTE — MR AVS SNAPSHOT
[address] [place] [address], [address] [facility] [address] 
[phone] Patient: Deyanira Gilmore MRN: [id number]:[id number] Visit Information Date & Time Provider Department Dept. Phone Encounter #  
 10/16/2018  3:15 PM Otis Penny MD [facility] [phone] [id number] Your Appointments 12/18/2018  3:30 PM  
Follow Up with Elissa Guevara MD  
[facility] [address]) Appt Note: 4 MO F/U  
 [address] [facility] [address]  
[phone]  
  
   
 [address] [facility] [address] Upcoming Health Maintenance Date Due Hepatitis C Screening 1955 Shingrix Vaccine Age 50> (1 of 2) 4/13/2003 GLAUCOMA SCREENING Q2Y 4/13/2018 Pneumococcal 65+ High/Highest Risk (1 of 2 - PCV13) 4/13/2018 MEDICARE YEARLY EXAM 5/2/2018 Influenza Age 5 to Adult 8/1/2018 DTaP/Tdap/Td series (2 - Td) 12/6/2024 COLONOSCOPY 5/27/2026 Allergies as of 10/16/2018  Review Complete On: 10/16/2018 By: Otis Penny MD  
  
 Severity Noted Reaction Type Reactions Amoxicillin  07/03/2018    Itching Augmentin [Amoxicillin-pot Clavulanate]    Itching Hibiclens [Chlorhexidine Gluconate]  03/16/2018    Itching Penicillins    Rash Requip [Ropinirole]  05/30/2018    Nausea and Vomiting Current Immunizations  Reviewed on 8/24/2018 Name Date Tdap 12/6/2014 Not reviewed this visit You Were Diagnosed With   
  
 Codes Comments Tear of right rotator cuff, unspecified tear extent    -  Primary ICD-10-CM: M75.101 ICD-9-CM: 840.4 Vitals BP Pulse Temp Resp Height(growth percentile) Weight(growth percentile) 133/81 (BP 1 Location: Left arm, BP Patient Position: Sitting) 80 96.1 °F (35.6 °C) (Oral) 16 5' 10\" (1.778 m) 221 lb (100.2 kg) SpO2 BMI Smoking Status 97% 31.71 kg/m2 Never Smoker BMI and BSA Data Body Mass Index Body Surface Area 31.71 kg/m 2 2.22 m 2 Preferred Pharmacy Pharmacy Name Phone [facility] Jameson Wilkinson [address] [place] [address] [phone] Your Updated Medication List  
  
   
This list is accurate as of 10/16/18  3:57 PM.  Always use your most recent med list.  
  
  
  
  
 acetaminophen 500 mg tablet Commonly known as:  TYLENOL Take 1,000 mg by mouth every six (6) hours as needed for Pain. ALPRAZolam 0.5 mg tablet Commonly known as:  [place] [employer] Take one half(1/2) tab to one(1) tab by mouth at bedtime as needed for sleep  
  
 butalbital-acetaminophen-caff -40 mg per capsule Commonly known as:  [employer] Take 2 capsules every 8 hours as needed for headache  
  
 celecoxib 200 mg capsule Commonly known as:  CELEBREX Take 1 Cap by mouth two (2) times a day. diclofenac 1 % Gel Commonly known as:  VOLTAREN Apply 4 g to affected area four (4) times daily. Maximum 16 grams per joint per day. Dispense 5 100 gram tubes  
  
 labetalol 100 mg tablet Commonly known as:  NORMODYNE  
TAKE ONE TABLET BY MOUTH TWICE DAILY  
  
 lansoprazole 30 mg capsule Commonly known as:  PREVACID TAKE 1 CAPSULE DAILY BEFOREBREAKFAST  
  
 lisinopril-hydroCHLOROthiazide 20-12.5 mg per tablet Commonly known as:  PRINZIDE, ZESTORETIC  
TAKE 1 TABLET BY MOUTH DAILY  
  
 metaxalone 800 mg tablet Commonly known as:  SKELAXIN Take 1 Tab by mouth three (3) times daily. Indications: Muscle Spasm  
  
 montelukast 10 mg tablet Commonly known as:  SINGULAIR  
TAKE 1 TABLET BY MOUTH EVERY DAY  
  
 predniSONE 10 mg tablet Commonly known as:  DELTASONE  
  
 raNITIdine 150 mg tablet Commonly known as:  ZANTAC TK 1 T PO HS  
  
 tamsulosin 0.4 mg capsule Commonly known as:  FLOMAX TAKE 1 CAPSULE BY MOUTH DAILY * warfarin 3 mg tablet Commonly known as:  COUMADIN Or as directed * warfarin 5 mg tablet Commonly known as:  COUMADIN  
TAKE 2 AND 1/2 TABLETS BY MOUTH DAILY OR AS DIRECTED * Notice: This list has 2 medication(s) that are the same as other medications prescribed for you. Read the directions carefully, and ask your doctor or other care provider to review them with you. To-Do List   
 10/17/2018 8:00 PM  
  Appointment with [facility] MRI RM 2 at [address] [facility] ([phone]) GENERAL INSTRUCTIONS  Bring information (ID card) if you have any medically implanted devices. You will be required to lie still while the procedure is being performed. Remove any jewelry (including body piercing, hairpins) prior to MRI. If you have had a creatinine level drawn within the past 30 days, please bring most recent results to your appt. Bring any films, CD's, and reports related to your study with you on the day of your exam.  This only includes studies done outside of [address] [place] [address], [facility], [place], and [facility]. Bring a complete list of all medications you are currently taking to include prescriptions, over-the-counter meds, herbals, vitamins & any dietary supplements. If you were given medications for claustrophobia or anxiety, please arrange to have someone drive you to your appointment. QUESTIONS  Notify the MRI Department if you have any questions concerning your study. Trevon [address] - [phone]  
  
 10/31/2018 Imaging:  MRI SHOULDER RT WO CONT   
  
 10/31/2018 1:00 PM  
  Appointment with [facility] RADIATION ONCOLOGY at [facility] ([phone]) ATTENTION: The Appointment Time in [address] may not reflect your scheduled appointment time as the time is only a place cain. If you have any questions about your appointment time, please call [facility] / [address] [place] [address] at [phone]. Please provide this summary of care documentation to your next provider. Your primary care clinician is listed as [facility]. If you have any questions after today's visit, please call [phone].

## 2018-10-17 ENCOUNTER — HOSPITAL ENCOUNTER (OUTPATIENT)
Age: 63
Discharge: HOME | End: 2018-10-17
Attending: ORTHOPAEDIC SURGERY
Payer: MEDICARE

## 2018-10-17 DIAGNOSIS — M75.101 TEAR OF RIGHT ROTATOR CUFF, UNSPECIFIED TEAR EXTENT: ICD-10-CM

## 2018-10-17 PROCEDURE — 73221 MRI JOINT UPR EXTREM W/O DYE: CPT

## 2018-10-18 RX ORDER — LISINOPRIL AND HYDROCHLOROTHIAZIDE 12.5; 2 MG/1; MG/1
TABLET ORAL
Qty: 90 TAB | Refills: 0 | Status: SHIPPED | OUTPATIENT
Start: 2018-10-18 | End: 2019-01-23 | Stop reason: SDUPTHER

## 2018-10-19 ENCOUNTER — OFFICE VISIT (OUTPATIENT)
Dept: ORTHOPEDIC SURGERY | Facility: CLINIC | Age: 63
End: 2018-10-19

## 2018-10-19 VITALS
RESPIRATION RATE: 18 BRPM | HEIGHT: 70 IN | DIASTOLIC BLOOD PRESSURE: 71 MMHG | SYSTOLIC BLOOD PRESSURE: 142 MMHG | HEART RATE: 79 BPM | WEIGHT: 221 LBS | BODY MASS INDEX: 31.64 KG/M2 | TEMPERATURE: 96.2 F | OXYGEN SATURATION: 98 %

## 2018-10-19 DIAGNOSIS — M75.121 COMPLETE TEAR OF RIGHT ROTATOR CUFF: Primary | ICD-10-CM

## 2018-10-19 RX ORDER — TRAMADOL HYDROCHLORIDE 50 MG/1
50 TABLET ORAL
Qty: 50 TAB | Refills: 0 | Status: SHIPPED | OUTPATIENT
Start: 2018-10-19 | End: 2018-11-09 | Stop reason: CLARIF

## 2018-10-19 NOTE — PROGRESS NOTES
Patient: Marah White                MRN: [id number]       SSN: [id number]  YOB: 1955        AGE: 70 y.o. SEX: male  Body mass index is 31.71 kg/m². PCP: Indigo Chau MD  10/19/18    Chief Complaint: Right shoulder follow up    HISTORY OF PRESENT ILLNESS:  Obinna Sierra returns to the office today for followup of his MRI of his right shoulder. He has been working on his range of motion, and his shoulder has loosened up some. He continues to have pain and weakness. Past Medical History:   Diagnosis Date    Arthritis     Bleeding     Blood clot in the legs     Chronic pain     knee and shoulder    Esophageal reflux     GERD (gastroesophageal reflux disease)     Headache(784.0)     migraine    High cholesterol     Hypertension     Lower back pain 11/6/2010    Other chest pain     Personal history of venous thrombosis and embolism     Pure hypercholesterolemia     Right buttock pain 11/6/2010    Right foot pain     Rotator cuff tear     left-since 2010, worsened tear Jan.    Spinal stenosis     Tendonitis, tibialis     anterior    Thromboembolus ([address]     3 after sx last one 2000    Total knee replacement status, left        Family History   Problem Relation Age of Onset   [facility] Arthritis-rheumatoid Mother     Dementia Mother     Heart Disease Father     Cancer Father     Hypertension Other     Cancer Brother        Current Outpatient Medications   Medication Sig Dispense Refill    traMADol (ULTRAM) 50 mg tablet Take 1 Tab by mouth every six (6) hours as needed for Pain. Max Daily Amount: 200 mg. 50 Tab 0    lisinopril-hydroCHLOROthiazide (PRINZIDE, ZESTORETIC) 20-12.5 mg per tablet TAKE 1 TABLET BY MOUTH DAILY 90 Tab 0    diclofenac (VOLTAREN) 1 % gel Apply 4 g to affected area four (4) times daily. Maximum 16 grams per joint per day.  Dispense 5 100 gram tubes 5 Each 0    raNITIdine (ZANTAC) 150 mg tablet TK 1 T PO HS  1    tamsulosin (FLOMAX) 0.4 mg capsule TAKE 1 CAPSULE BY MOUTH DAILY 30 Cap 0    labetalol (NORMODYNE) 100 mg tablet TAKE ONE TABLET BY MOUTH TWICE DAILY 180 Tab 0    ALPRAZolam (XANAX) 0.5 mg tablet Take one half(1/2) tab to one(1) tab by mouth at bedtime as needed for sleep 30 Tab 0    lansoprazole (PREVACID) 30 mg capsule TAKE 1 CAPSULE DAILY BEFOREBREAKFAST 90 Cap 3    warfarin (COUMADIN) 5 mg tablet TAKE 2 AND 1/2 TABLETS BY MOUTH DAILY OR AS DIRECTED 75 Tab 0    celecoxib (CELEBREX) 200 mg capsule Take 1 Cap by mouth two (2) times a day. (Patient taking differently: Take 200 mg by mouth daily. ) 180 Cap 0    butalbital-acetaminophen-caff (FIORICET) -40 mg per capsule Take 2 capsules every 8 hours as needed for headache 126 Cap 1    warfarin (COUMADIN) 3 mg tablet Or as directed 30 Tab 3    metaxalone (SKELAXIN) 800 mg tablet Take 1 Tab by mouth three (3) times daily. Indications: Muscle Spasm (Patient taking differently: Take 800 mg by mouth three (3) times daily as needed. Indications: Muscle Spasm) 90 Tab 2    montelukast (SINGULAIR) 10 mg tablet TAKE 1 TABLET BY MOUTH EVERY DAY (Patient taking differently: TAKE 1 TABLET BY MOUTH EVERY HS) 90 Tab 0    acetaminophen (TYLENOL) 500 mg tablet Take 1,000 mg by mouth every six (6) hours as needed for Pain.       predniSONE (DELTASONE) 10 mg tablet   0       Allergies   Allergen Reactions    Amoxicillin Itching    Augmentin [Amoxicillin-Pot Clavulanate] Itching    Hibiclens [Chlorhexidine Gluconate] Itching    Penicillins Rash    Requip [Ropinirole] Nausea and Vomiting       Past Surgical History:   Procedure Laterality Date    FOOT/TOES SURGERY PROC UNLISTED      HX BACK SURGERY      HX KNEE REPLACEMENT Left     HX ORTHOPAEDIC  06-25-12    Right foot with excision of bursa and adipose tissue from fifth metatarsal base by Dr. Jethro Lopez      lower back (1992 and 2000)    HX OTHER SURGICAL      left foot (2008)    HX OTHER SURGICAL      Retina repair  LAMINOTOMY      NERVE BLOCK         Social History     Socioeconomic History    Marital status:      Spouse name: Not on file    Number of children: Not on file    Years of education: Not on file    Highest education level: Not on file   Social Needs    Financial resource strain: Not on file    Food insecurity - worry: Not on file    Food insecurity - inability: Not on file    Transportation needs - medical: Not on file   [employer] needs - non-medical: Not on file   Occupational History    Not on file   Tobacco Use    Smoking status: Never Smoker    Smokeless tobacco: Never Used   Substance and Sexual Activity    Alcohol use: No    Drug use: No    Sexual activity: Not Currently   Other Topics Concern    Not on file   Social History Narrative    Not on file       REVIEW OF SYSTEMS:      No changes from previous review of systems unless noted. PHYSICAL EXAMINATION:  Visit Vitals  /71   Pulse 79   Temp 96.2 °F (35.7 °C) (Oral)   Resp 18   Ht 5' 10\" (1.778 m)   Wt 221 lb (100.2 kg)   SpO2 98%   BMI 31.71 kg/m²     Body mass index is 31.71 kg/m². GENERAL: Alert and oriented x3, in no acute distress. HEENT: Normocephalic, atraumatic. RESP: Non labored breathing. SKIN: No rashes or lesions noted. Physical exam of the right shoulder is with full passive range of motion with forward flexion and external rotation. He does have some weakness and pain with attempted forward flexion and external rotation. There is pain and weakness with supraspinatus, infraspinatus, and subscapularis testing, as well as biceps testing. He is neurovascularly intact distally. IMAGING:  An MRI of the right shoulder was reviewed in the office. I personally reviewed these images. These show a full-thickness tear of the supraspinatus and infraspinatus and likely upper border full-thickness tear of the subscapularis. There is retraction to the mid-humeral level.      ASSESSMENT/PLAN: Cassy Wu is a 77-year-old male with a right shoulder, massive rotator cuff tear. This involves three of the tendons of the rotator cuff. I have discussed with him the treatment options. I do recommend surgery for his age group and four his activity level. Unfortunately, for right now, it is not a good time for him to have surgery. He is going to have prostate surgery next month. He is looking to have surgery sometime in January. We will see about setting it up for him then.             Electronically signed by: Yana Schaeffer MD

## 2018-10-23 ENCOUNTER — OFFICE VISIT (OUTPATIENT)
Dept: ORTHOPEDIC SURGERY | Facility: CLINIC | Age: 63
End: 2018-10-23

## 2018-10-23 VITALS
RESPIRATION RATE: 16 BRPM | WEIGHT: 221 LBS | HEIGHT: 70 IN | DIASTOLIC BLOOD PRESSURE: 84 MMHG | OXYGEN SATURATION: 99 % | HEART RATE: 76 BPM | TEMPERATURE: 96.6 F | BODY MASS INDEX: 31.64 KG/M2 | SYSTOLIC BLOOD PRESSURE: 140 MMHG

## 2018-10-23 DIAGNOSIS — M75.121 COMPLETE TEAR OF RIGHT ROTATOR CUFF: Primary | ICD-10-CM

## 2018-10-23 RX ORDER — HYDROCODONE BITARTRATE AND ACETAMINOPHEN 5; 325 MG/1; MG/1
1 TABLET ORAL
Qty: 30 TAB | Refills: 0 | Status: SHIPPED | OUTPATIENT
Start: 2018-10-23 | End: 2018-11-24 | Stop reason: ALTCHOICE

## 2018-10-23 NOTE — PROGRESS NOTES
Patient: Sophia Yañez                MRN: [id number]       SSN: [id number]  YOB: 1955        AGE: 70 y.o. SEX: male  Body mass index is 31.71 kg/m². PCP: Hayde Manley MD  10/23/18    Chief Complaint: Right shoulder follow up    HISTORY OF PRESENT ILLNESS:  Sherif Warner returns today for his right shoulder. He has a known, full-thickness, rotator cuff tear. He was getting something out of a cabinet this morning when his arm caught on the cabinet. He had an extension moment at the elbow, and he felt some sharp pain in his biceps, and that radiated up into his shoulder. He has also noticed a slight deformity of his biceps since then. He says this is worsening pain. He is unable to work due to this. Past Medical History:   Diagnosis Date    Arthritis     Bleeding     Blood clot in the legs     Chronic pain     knee and shoulder    Esophageal reflux     GERD (gastroesophageal reflux disease)     Headache(784.0)     migraine    High cholesterol     Hypertension     Lower back pain 11/6/2010    Other chest pain     Personal history of venous thrombosis and embolism     Pure hypercholesterolemia     Right buttock pain 11/6/2010    Right foot pain     Rotator cuff tear     left-since 2010, worsened tear Jan.    Spinal stenosis     Tendonitis, tibialis     anterior    Thrombo[address] ([facility] [address]     3 after sx last one 2000    Total knee replacement status, left        Family History   Problem Relation Age of Onset   [facility] Arthritis-rheumatoid Mother     Dementia Mother     Heart Disease Father     Cancer Father     Hypertension Other     Cancer Brother        Current Outpatient Medications   Medication Sig Dispense Refill    HYDROcodone-acetaminophen (NORCO) 5-325 mg per tablet Take 1 Tab by mouth every six (6) hours as needed for Pain. Max Daily Amount: 4 Tabs. 30 Tab 0    traMADol (ULTRAM) 50 mg tablet Take 1 Tab by mouth every six (6) hours as needed for Pain. Max Daily Amount: 200 mg. 50 Tab 0    lisinopril-hydroCHLOROthiazide (PRINZIDE, ZESTORETIC) 20-12.5 mg per tablet TAKE 1 TABLET BY MOUTH DAILY 90 Tab 0    predniSONE (DELTASONE) 10 mg tablet   0    diclofenac (VOLTAREN) 1 % gel Apply 4 g to affected area four (4) times daily. Maximum 16 grams per joint per day. Dispense 5 100 gram tubes 5 Each 0    raNITIdine (ZANTAC) 150 mg tablet TK 1 T PO HS  1    tamsulosin (FLOMAX) 0.4 mg capsule TAKE 1 CAPSULE BY MOUTH DAILY 30 Cap 0    labetalol (NORMODYNE) 100 mg tablet TAKE ONE TABLET BY MOUTH TWICE DAILY 180 Tab 0    ALPRAZolam (XANAX) 0.5 mg tablet Take one half(1/2) tab to one(1) tab by mouth at bedtime as needed for sleep 30 Tab 0    lansoprazole (PREVACID) 30 mg capsule TAKE 1 CAPSULE DAILY BEFOREBREAKFAST 90 Cap 3    warfarin (COUMADIN) 5 mg tablet TAKE 2 AND 1/2 TABLETS BY MOUTH DAILY OR AS DIRECTED 75 Tab 0    celecoxib (CELEBREX) 200 mg capsule Take 1 Cap by mouth two (2) times a day. (Patient taking differently: Take 200 mg by mouth daily. ) 180 Cap 0    butalbital-acetaminophen-caff (FIORICET) -40 mg per capsule Take 2 capsules every 8 hours as needed for headache 126 Cap 1    warfarin (COUMADIN) 3 mg tablet Or as directed 30 Tab 3    metaxalone (SKELAXIN) 800 mg tablet Take 1 Tab by mouth three (3) times daily. Indications: Muscle Spasm (Patient taking differently: Take 800 mg by mouth three (3) times daily as needed. Indications: Muscle Spasm) 90 Tab 2    montelukast (SINGULAIR) 10 mg tablet TAKE 1 TABLET BY MOUTH EVERY DAY (Patient taking differently: TAKE 1 TABLET BY MOUTH EVERY HS) 90 Tab 0    acetaminophen (TYLENOL) 500 mg tablet Take 1,000 mg by mouth every six (6) hours as needed for Pain.          Allergies   Allergen Reactions    Amoxicillin Itching    Augmentin [Amoxicillin-Pot Clavulanate] Itching    Hibiclens [Chlorhexidine Gluconate] Itching    Penicillins Rash    Requip [Ropinirole] Nausea and Vomiting       Past Surgical History:   Procedure Laterality Date    FOOT/TOES SURGERY PROC UNLISTED      HX BACK SURGERY      HX KNEE REPLACEMENT Left     HX ORTHOPAEDIC  06-25-12    Right foot with excision of bursa and adipose tissue from fifth metatarsal base by Dr. Ortega Hawley      lower back (1992 and 2000)    HX OTHER SURGICAL      left foot (2008)    HX OTHER SURGICAL      Retina repair     LAMINOTOMY      NERVE BLOCK         Social History     Socioeconomic History    Marital status:      Spouse name: Not on file    Number of children: Not on file    Years of education: Not on file    Highest education level: Not on file   Social Needs    Financial resource strain: Not on file    Food insecurity - worry: Not on file    Food insecurity - inability: Not on file   [employer] needs - medical: Not on file   [employer] needs - non-medical: Not on file   Occupational History    Not on file   Tobacco Use    Smoking status: Never Smoker    Smokeless tobacco: Never Used   Substance and Sexual Activity    Alcohol use: No    Drug use: No    Sexual activity: Not Currently   Other Topics Concern    Not on file   Social History Narrative    Not on file       REVIEW OF SYSTEMS:      No changes from previous review of systems unless noted. PHYSICAL EXAMINATION:  Visit Vitals  /84 (BP 1 Location: Left arm, BP Patient Position: Sitting)   Pulse 76   Temp 96.6 °F (35.9 °C) (Oral)   Resp 16   Ht 5' 10\" (1.778 m)   Wt 221 lb (100.2 kg)   SpO2 99%   BMI 31.71 kg/m²     Body mass index is 31.71 kg/m². GENERAL: Alert and oriented x3, in no acute distress. HEENT: Normocephalic, atraumatic. RESP: Non labored breathing. SKIN: No rashes or lesions noted. Physical exam of the right shoulder is with full passive range of motion with forward flexion to 160° and external rotation to 30°. He does appear to have a Kole deformity today.   His biceps tendon is palpable in the antecubital fossa. He has pain and weakness with supraspinatus, subscapularis, and infraspinatus testing. He is neurovascularly intact distally. There is no obvious swelling or deformity about the elbow, forearm, wrist, and hand. ASSESSMENT/PLAN:  Eli Borrero is a 33-year-old male with a known, right rotator cuff tear, likely acute-on-chronic. I think, today, he may have sustained a biceps rupture. This does not change my recommendation. My recommendation is that he consider arthroscopic rotator cuff repair. He has some issues going on with his prostate, and he has to determine the date of surgery for that, as well as when he can have the shoulder surgery. We discussed the timing of both of those procedures at length. He is going to contact the office to let us know when he would like to schedule his surgery.             Electronically signed by: Tona Lowe MD

## 2018-10-30 RX ORDER — TAMSULOSIN HYDROCHLORIDE 0.4 MG/1
CAPSULE ORAL
Qty: 30 CAP | Refills: 0 | Status: SHIPPED | OUTPATIENT
Start: 2018-10-30 | End: 2018-11-13

## 2018-10-31 ENCOUNTER — APPOINTMENT (OUTPATIENT)
Dept: RADIATION THERAPY | Age: 63
End: 2018-10-31

## 2018-10-31 ENCOUNTER — DOCUMENTATION ONLY (OUTPATIENT)
Dept: ORTHOPEDIC SURGERY | Facility: CLINIC | Age: 63
End: 2018-10-31

## 2018-10-31 NOTE — PROGRESS NOTES
Patient came into the [facility] office and dropped off a Disability form to be completed by Dr. Nito Tran. Please fax back to fax number on the top of the form.    [phone]

## 2018-11-03 ENCOUNTER — HOSPITAL ENCOUNTER (OUTPATIENT)
Dept: GENERAL RADIOLOGY | Age: 63
Discharge: HOME | End: 2018-11-03
Payer: MEDICARE

## 2018-11-03 ENCOUNTER — HOSPITAL ENCOUNTER (OUTPATIENT)
Dept: PREADMISSION TESTING | Age: 63
Discharge: HOME | End: 2018-11-03
Payer: MEDICARE

## 2018-11-03 DIAGNOSIS — Z01.818 PRE-OP EXAM: ICD-10-CM

## 2018-11-03 LAB
ANION GAP SERPL CALC-SCNC: 5 MMOL/L (ref 3–18)
APPEARANCE UR: CLEAR
BILIRUB UR QL: NEGATIVE
BUN SERPL-MCNC: 12 MG/DL (ref 7–18)
BUN/CREAT SERPL: 13 (ref 12–20)
CALCIUM SERPL-MCNC: 8.4 MG/DL (ref 8.5–10.1)
CHLORIDE SERPL-SCNC: 107 MMOL/L (ref 100–108)
CO2 SERPL-SCNC: 29 MMOL/L (ref 21–32)
COLOR UR: YELLOW
CREAT SERPL-MCNC: 0.91 MG/DL (ref 0.6–1.3)
ERYTHROCYTE [DISTWIDTH] IN BLOOD BY AUTOMATED COUNT: 13.3 % (ref 11.6–14.5)
GLUCOSE SERPL-MCNC: 115 MG/DL (ref 74–99)
GLUCOSE UR STRIP.AUTO-MCNC: NEGATIVE MG/DL
HCT VFR BLD AUTO: 39 % (ref 36–48)
HGB BLD-MCNC: 13.1 G/DL (ref 13–16)
HGB UR QL STRIP: NEGATIVE
KETONES UR QL STRIP.AUTO: NEGATIVE MG/DL
LEUKOCYTE ESTERASE UR QL STRIP.AUTO: NEGATIVE
MCH RBC QN AUTO: 31.7 PG (ref 24–34)
MCHC RBC AUTO-ENTMCNC: 33.6 G/DL (ref 31–37)
MCV RBC AUTO: 94.4 FL (ref 74–97)
NITRITE UR QL STRIP.AUTO: NEGATIVE
PH UR STRIP: 6 [PH] (ref 5–8)
PLATELET # BLD AUTO: 193 K/UL (ref 135–420)
PMV BLD AUTO: 10.8 FL (ref 9.2–11.8)
POTASSIUM SERPL-SCNC: 3.9 MMOL/L (ref 3.5–5.5)
PROT UR STRIP-MCNC: NEGATIVE MG/DL
RBC # BLD AUTO: 4.13 M/UL (ref 4.7–5.5)
SODIUM SERPL-SCNC: 141 MMOL/L (ref 136–145)
SP GR UR REFRACTOMETRY: 1.01 (ref 1–1.03)
UROBILINOGEN UR QL STRIP.AUTO: 0.2 EU/DL (ref 0.2–1)
WBC # BLD AUTO: 4.7 K/UL (ref 4.6–13.2)

## 2018-11-03 PROCEDURE — 81003 URINALYSIS AUTO W/O SCOPE: CPT | Performed by: UROLOGY

## 2018-11-03 PROCEDURE — 85027 COMPLETE CBC AUTOMATED: CPT | Performed by: UROLOGY

## 2018-11-03 PROCEDURE — 36415 COLL VENOUS BLD VENIPUNCTURE: CPT | Performed by: UROLOGY

## 2018-11-03 PROCEDURE — 80048 BASIC METABOLIC PNL TOTAL CA: CPT | Performed by: UROLOGY

## 2018-11-03 PROCEDURE — 93005 ELECTROCARDIOGRAM TRACING: CPT

## 2018-11-03 PROCEDURE — 87086 URINE CULTURE/COLONY COUNT: CPT | Performed by: UROLOGY

## 2018-11-03 PROCEDURE — 71046 X-RAY EXAM CHEST 2 VIEWS: CPT

## 2018-11-04 LAB
ATRIAL RATE: 76 BPM
BACTERIA SPEC CULT: NORMAL
CALCULATED P AXIS, ECG09: 53 DEGREES
CALCULATED R AXIS, ECG10: 5 DEGREES
CALCULATED T AXIS, ECG11: 63 DEGREES
DIAGNOSIS, 93000: NORMAL
P-R INTERVAL, ECG05: 180 MS
Q-T INTERVAL, ECG07: 366 MS
QRS DURATION, ECG06: 94 MS
QTC CALCULATION (BEZET), ECG08: 411 MS
SERVICE CMNT-IMP: NORMAL
VENTRICULAR RATE, ECG03: 76 BPM

## 2018-11-05 ENCOUNTER — DOCUMENTATION ONLY (OUTPATIENT)
Dept: ORTHOPEDIC SURGERY | Facility: CLINIC | Age: 63
End: 2018-11-05

## 2018-11-05 NOTE — PROGRESS NOTES
Completed & faxed [employer] forms. Pt called and made aware that forms are ready to be picked up at [facility] office.

## 2018-11-06 ENCOUNTER — OFFICE VISIT (OUTPATIENT)
Dept: INTERNAL MEDICINE CLINIC | Age: 63
End: 2018-11-06

## 2018-11-06 VITALS
BODY MASS INDEX: 32.21 KG/M2 | HEART RATE: 89 BPM | RESPIRATION RATE: 16 BRPM | SYSTOLIC BLOOD PRESSURE: 122 MMHG | OXYGEN SATURATION: 97 % | WEIGHT: 225 LBS | HEIGHT: 70 IN | DIASTOLIC BLOOD PRESSURE: 76 MMHG | TEMPERATURE: 98.7 F

## 2018-11-06 DIAGNOSIS — G44.229 CHRONIC TENSION-TYPE HEADACHE, NOT INTRACTABLE: ICD-10-CM

## 2018-11-06 DIAGNOSIS — E78.00 PURE HYPERCHOLESTEROLEMIA: ICD-10-CM

## 2018-11-06 DIAGNOSIS — Z86.718 PERSONAL HISTORY OF VENOUS THROMBOSIS AND EMBOLISM: ICD-10-CM

## 2018-11-06 DIAGNOSIS — Z79.01 WARFARIN ANTICOAGULATION: ICD-10-CM

## 2018-11-06 DIAGNOSIS — C61 PROSTATE CANCER (HCC): ICD-10-CM

## 2018-11-06 DIAGNOSIS — I10 ESSENTIAL HYPERTENSION: Primary | ICD-10-CM

## 2018-11-06 NOTE — PROGRESS NOTES
Yao Bass is a 70 y.o. male presenting today for Pre-op Exam ([facility] Laparoscopic Radical Prostectomy with possible BPLND)  . HPI:  Yao Bass presents to the office today for preoperative examination. Patient is scheduled for da Gracie laparoscopic radical prostatectomy with possible bilateral pelvic lymph node dissection. Patient presents to the office today without evidence of chest pain, palpitation, dyspnea, abdominal pain, nausea or vomiting. Patient blood pressure today is 122/76 and he is afebrile. He takes Coumadin daily and reports that he is scheduled to stop his Coumadin today. He is complaining of generalized body aches as he generally takes Celebrex 200 mg tablets. Patient reports he has held Celebrex also. Review of Systems   Constitutional: Negative for malaise/fatigue. HENT: Negative for congestion. Respiratory: Negative for cough and shortness of breath. Cardiovascular: Negative for chest pain and palpitations. Gastrointestinal: Negative for abdominal pain, diarrhea, heartburn, nausea and vomiting. Genitourinary: Negative for dysuria, frequency and urgency. Neurological: Negative for dizziness and headaches. Allergies   Allergen Reactions    Amoxicillin Itching    Augmentin [Amoxicillin-Pot Clavulanate] Itching    Hibiclens [Chlorhexidine Gluconate] Itching    Penicillins Rash    Requip [Ropinirole] Nausea and Vomiting       Current Outpatient Medications   Medication Sig Dispense Refill    celecoxib (CELEBREX) 200 mg capsule TAKE 1 CAPSULE BY MOUTH TWICE DAILY 180 Cap 0    tamsulosin (FLOMAX) 0.4 mg capsule TAKE 1 CAPSULE BY MOUTH DAILY 30 Cap 0    HYDROcodone-acetaminophen (NORCO) 5-325 mg per tablet Take 1 Tab by mouth every six (6) hours as needed for Pain. Max Daily Amount: 4 Tabs. 30 Tab 0    traMADol (ULTRAM) 50 mg tablet Take 1 Tab by mouth every six (6) hours as needed for Pain.  Max Daily Amount: 200 mg. 50 Tab 0    lisinopril-hydroCHLOROthiazide (PRINZIDE, ZESTORETIC) 20-12.5 mg per tablet TAKE 1 TABLET BY MOUTH DAILY 90 Tab 0    diclofenac (VOLTAREN) 1 % gel Apply 4 g to affected area four (4) times daily. Maximum 16 grams per joint per day. Dispense 5 100 gram tubes 5 Each 0    raNITIdine (ZANTAC) 150 mg tablet TK 1 T PO HS  1    labetalol (NORMODYNE) 100 mg tablet TAKE ONE TABLET BY MOUTH TWICE DAILY 180 Tab 0    ALPRAZolam (XANAX) 0.5 mg tablet Take one half(1/2) tab to one(1) tab by mouth at bedtime as needed for sleep 30 Tab 0    lansoprazole (PREVACID) 30 mg capsule TAKE 1 CAPSULE DAILY BEFOREBREAKFAST 90 Cap 3    warfarin (COUMADIN) 5 mg tablet TAKE 2 AND 1/2 TABLETS BY MOUTH DAILY OR AS DIRECTED 75 Tab 0    butalbital-acetaminophen-caff (FIORICET) -40 mg per capsule Take 2 capsules every 8 hours as needed for headache 126 Cap 1    warfarin (COUMADIN) 3 mg tablet Or as directed 30 Tab 3    metaxalone (SKELAXIN) 800 mg tablet Take 1 Tab by mouth three (3) times daily. Indications: Muscle Spasm (Patient taking differently: Take 800 mg by mouth three (3) times daily as needed. Indications: Muscle Spasm) 90 Tab 2    montelukast (SINGULAIR) 10 mg tablet TAKE 1 TABLET BY MOUTH EVERY DAY (Patient taking differently: TAKE 1 TABLET BY MOUTH EVERY HS) 90 Tab 0    acetaminophen (TYLENOL) 500 mg tablet Take 1,000 mg by mouth every six (6) hours as needed for Pain.  enoxaparin (LOVENOX) 40 mg/0.4 mL 0.4 mL by SubCUTAneous route daily.  0.4 mL 10       Past Medical History:   Diagnosis Date    Arthritis     Bleeding     Blood clot in the legs     Chronic pain     knee and shoulder    Esophageal reflux     GERD (gastroesophageal reflux disease)     Headache(784.0)     migraine    High cholesterol     Hypertension     Lower back pain 11/6/2010    Other chest pain     Personal history of venous thrombosis and embolism     Pure hypercholesterolemia     Right buttock pain 11/6/2010    Right foot pain  Rotator cuff tear     left-since 2010, worsened tear Jan.    Spinal stenosis     Tendonitis, tibialis     anterior    Thromboembolus ([facility] [address]     3 after sx last one 2000    Total knee replacement status, left        Past Surgical History:   Procedure Laterality Date    FOOT/TOES SURGERY PROC UNLISTED      HX BACK SURGERY      HX KNEE REPLACEMENT Left     HX ORTHOPAEDIC  06-25-12    Right foot with excision of bursa and adipose tissue from fifth metatarsal base by Dr. Macario Rizo      lower back (1992 and 2000)    HX OTHER SURGICAL      left foot (2008)    HX OTHER SURGICAL      Retina repair     LAMINOTOMY      NERVE BLOCK         Social History     Socioeconomic History    Marital status:      Spouse name: Not on file    Number of children: Not on file    Years of education: Not on file    Highest education level: Not on file   Social Needs    Financial resource strain: Not on file    Food insecurity - worry: Not on file    Food insecurity - inability: Not on file   [employer] needs - medical: Not on file   [employer] needs - non-medical: Not on file   Occupational History    Not on file   Tobacco Use    Smoking status: Never Smoker    Smokeless tobacco: Never Used   Substance and Sexual Activity    Alcohol use: No    Drug use: No    Sexual activity: Not Currently   Other Topics Concern    Not on file   Social History Narrative    Not on file       Patient does not have an advanced directive on file    Vitals:    11/06/18 1246   BP: 122/76   Pulse: 89   Resp: 16   Temp: 98.7 °F (37.1 °C)   TempSrc: Tympanic   SpO2: 97%   Weight: 225 lb (102.1 kg)   Height: 5' 10\" (1.778 m)   PainSc:   5   PainLoc: Shoulder       Physical Exam   Constitutional: No distress. HENT:   Head: Normocephalic. Neck: Normal range of motion. Neck supple. Cardiovascular: Normal rate, regular rhythm and intact distal pulses.    Pulmonary/Chest: Effort normal and breath sounds normal. No respiratory distress. Abdominal: Soft. Bowel sounds are normal.   Lymphadenopathy:     He has no cervical adenopathy. Neurological: He is alert. Skin: Skin is warm and dry. Nursing note and vitals reviewed.       Hospital Outpatient Visit on 11/03/2018   Component Date Value Ref Range Status    Ventricular Rate 11/03/2018 76  BPM Final    Atrial Rate 11/03/2018 76  BPM Final    P-R Interval 11/03/2018 180  ms Final    QRS Duration 11/03/2018 94  ms Final    Q-T Interval 11/03/2018 366  ms Final    QTC Calculation (Bezet) 11/03/2018 411  ms Final    Calculated P Axis 11/03/2018 53  degrees Final    Calculated R Axis 11/03/2018 5  degrees Final    Calculated T Axis 11/03/2018 63  degrees Final    Diagnosis 11/03/2018    Final                    Value:Normal sinus rhythm  Possible Left atrial enlargement  Inferior infarct , age undetermined  Abnormal ECG  When compared with ECG of 31-JAN-2018 11:29,  No significant change was found  Confirmed by Miroslava Early ([id number]) on 11/4/2018 9:40:19 PM      WBC 11/03/2018 4.7  4.6 - 13.2 K/uL Final    RBC 11/03/2018 4.13* 4.70 - 5.50 M/uL Final    HGB 11/03/2018 13.1  13.0 - 16.0 g/dL Final    HCT 11/03/2018 39.0  36.0 - 48.0 % Final    MCV 11/03/2018 94.4  74.0 - 97.0 FL Final    MCH 11/03/2018 31.7  24.0 - 34.0 PG Final    MCHC 11/03/2018 33.6  31.0 - 37.0 g/dL Final    RDW 11/03/2018 13.3  11.6 - 14.5 % Final    PLATELET 33/60/0741 690  135 - 420 K/uL Final    MPV 11/03/2018 10.8  9.2 - 11.8 FL Final    Sodium 11/03/2018 141  136 - 145 mmol/L Final    Potassium 11/03/2018 3.9  3.5 - 5.5 mmol/L Final    Chloride 11/03/2018 107  100 - 108 mmol/L Final    CO2 11/03/2018 29  21 - 32 mmol/L Final    Anion gap 11/03/2018 5  3.0 - 18 mmol/L Final    Glucose 11/03/2018 115* 74 - 99 mg/dL Final    BUN 11/03/2018 12  7.0 - 18 MG/DL Final    Creatinine 11/03/2018 0.91  0.6 - 1.3 MG/DL Final    BUN/Creatinine ratio 11/03/2018 13  12 - 20 Final    GFR est AA 11/03/2018 >60  >60 ml/min/1.73m2 Final    GFR est non-AA 11/03/2018 >60  >60 ml/min/1.73m2 Final    Comment: (NOTE)  Estimated GFR is calculated using the Modification of Diet in Renal   Disease (MDRD) Study equation, reported for both  Americans   (GFRAA) and non- Americans (GFRNA), and normalized to 1.73m2   body surface area. The physician must decide which value applies to   the patient. The MDRD study equation should only be used in   individuals age 25 or older. It has not been validated for the   following: pregnant women, patients with serious comorbid conditions,   or on certain medications, or persons with extremes of body size,   muscle mass, or nutritional status.       Calcium 11/03/2018 8.4* 8.5 - 10.1 MG/DL Final    Color 11/03/2018 YELLOW    Final    Appearance 11/03/2018 CLEAR    Final    Specific gravity 11/03/2018 1.013  1.005 - 1.030   Final    pH (UA) 11/03/2018 6.0  5.0 - 8.0   Final    Protein 11/03/2018 NEGATIVE   NEG mg/dL Final    Glucose 11/03/2018 NEGATIVE   NEG mg/dL Final    Ketone 11/03/2018 NEGATIVE   NEG mg/dL Final    Bilirubin 11/03/2018 NEGATIVE   NEG   Final    Blood 11/03/2018 NEGATIVE   NEG   Final    Urobilinogen 11/03/2018 0.2  0.2 - 1.0 EU/dL Final    Nitrites 11/03/2018 NEGATIVE   NEG   Final    Leukocyte Esterase 11/03/2018 NEGATIVE   NEG   Final    Special Requests: 11/03/2018 NO SPECIAL REQUESTS    Final    Culture result: 11/03/2018 NO GROWTH 1 DAY    Final   Anti-Coag visit on 10/16/2018   Component Date Value Ref Range Status    VALID INTERNAL CONTROL POC 10/16/2018 Yes   Final    INR POC 10/16/2018 1.6   Final   Office Visit on 10/08/2018   Component Date Value Ref Range Status    Glucose (UA POC) 10/08/2018 1+  Negative Final    Bilirubin (UA POC) 10/08/2018 Negative  Negative Final    Ketones (UA POC) 10/08/2018 Negative  Negative Final    Specific gravity (UA POC) 10/08/2018 1.010  1.001 - 1.035 Final    Blood (UA POC) 10/08/2018 Negative  Negative Final    pH (UA POC) 10/08/2018 6.5  4.6 - 8.0 Final    Protein (UA POC) 10/08/2018 Negative  Negative Final    Urobilinogen (UA POC) 10/08/2018 0.2 mg/dL  0.2 - 1 Final    Nitrites (UA POC) 10/08/2018 Negative  Negative Final    Leukocyte esterase (UA POC) 10/08/2018 Negative  Negative Final   Office Visit on 09/20/2018   Component Date Value Ref Range Status    Color (UA POC) 09/20/2018 Yellow   Final    Clarity (UA POC) 09/20/2018 Clear   Final    Glucose (UA POC) 09/20/2018 Negative  Negative Final    Bilirubin (UA POC) 09/20/2018 Negative  Negative Final    Ketones (UA POC) 09/20/2018 Negative  Negative Final    Specific gravity (UA POC) 09/20/2018 1.015  1.001 - 1.035 Final    Blood (UA POC) 09/20/2018 Trace  Negative Final    pH (UA POC) 09/20/2018 5.5  4.6 - 8.0 Final    Protein (UA POC) 09/20/2018 Negative  Negative Final    Urobilinogen (UA POC) 09/20/2018 0.2 mg/dL  0.2 - 1 Final    Nitrites (UA POC) 09/20/2018 Negative  Negative Final    Leukocyte esterase (UA POC) 09/20/2018 Negative  Negative Final   Office Visit on 08/30/2018   Component Date Value Ref Range Status    Hemoglobin A1c (POC) 08/30/2018 5.9  % Final    Glucose POC 08/30/2018 106  mg/dL Final   Hospital Outpatient Visit on 08/29/2018   Component Date Value Ref Range Status    Creatinine, POC 08/29/2018 1.0  0.6 - 1.3 MG/DL Final    GFRAA, POC 08/29/2018 >60  >60 ml/min/1.73m2 Final    GFRNA, POC 08/29/2018 >60  >60 ml/min/1.73m2 Final    Comment: Estimated GFR is calculated using the IDMS-traceable Modification of Diet in Renal Disease (MDRD) Study equation, reported for both  Americans (GFRAA) and non- Americans (GFRNA), and normalized to 1.73m2 body surface area. The physician must decide which value applies to the patient. The MDRD study equation should only be used in individuals age 25 or older.  It has not been validated for the following: pregnant women, patients with serious comorbid conditions, or on certain medications, or persons with extremes of body size, muscle mass, or nutritional status. Office Visit on 08/15/2018   Component Date Value Ref Range Status    Color (UA POC) 08/15/2018 Yellow   Final    Clarity (UA POC) 08/15/2018 Clear   Final    Glucose (UA POC) 08/15/2018 Negative  Negative Final    Bilirubin (UA POC) 08/15/2018 Negative  Negative Final    Ketones (UA POC) 08/15/2018 Negative  Negative Final    Specific gravity (UA POC) 08/15/2018 1.020  1.001 - 1.035 Final    Blood (UA POC) 08/15/2018 Negative  Negative Final    pH (UA POC) 08/15/2018 5.5  4.6 - 8.0 Final    Protein (UA POC) 08/15/2018 Negative  Negative Final    Urobilinogen (UA POC) 08/15/2018 0.2 mg/dL  0.2 - 1 Final    Nitrites (UA POC) 08/15/2018 Negative  Negative Final    Leukocyte esterase (UA POC) 08/15/2018 Negative  Negative Final   Anti-Coag visit on 08/09/2018   Component Date Value Ref Range Status    VALID INTERNAL CONTROL POC 08/09/2018 Yes   Final    INR POC 08/09/2018 1.6   Final       .No results found for any visits on 11/06/18. Assessment / Plan:      ICD-10-CM ICD-9-CM    1. Essential hypertension I10 401.9    2. Prostate cancer ([facility] [address] C61 185    3. Chronic tension-type headache, not intractable G44.229 339.12    4. Warfarin anticoagulation Z79.01 V58.61    5. Pure hypercholesterolemia E78.00 272.0    6. Personal history of venous thrombosis and embolism Z86.718 V12.51      Preoperative examination-patient is optimized for surgery  Hypertension is controlled blood pressure is 122/76  Patient reports he will hold Coumadin starting tomorrow               Patient has a history of venous thrombus embolism  Follow-up as needed      Follow-up Disposition:  Return if symptoms worsen or fail to improve. I asked the patient if he  had any questions and answered his  questions.   The patient stated that he understands the treatment plan and agrees with the treatment plan

## 2018-11-06 NOTE — PROGRESS NOTES
Chief Complaint   Patient presents with    Pre-op Exam     Davinci Laparoscopic Radical Prostectomy with possible BPLND         Is someone accompanying this pt? yes    Is the patient using any DME equipment during OV? no    Depression Screening:  PHQ over the last two weeks 10/23/2018 10/19/2018 10/16/2018 10/8/2018 8/30/2018 8/24/2018 8/20/2018   PHQ Not Done - Patient Decline - - - - -   Little interest or pleasure in doing things Not at all - Not at all Not at all Several days Not at all Not at all   Feeling down, depressed, irritable, or hopeless Not at all - Not at all Not at all Not at all Not at all Not at all   Total Score PHQ 2 0 - 0 0 1 0 0       Learning Assessment:  Learning Assessment 11/6/2018 9/25/2015   PRIMARY LEARNER Patient Patient   HIGHEST LEVEL OF EDUCATION - PRIMARY LEARNER  GRADUATED HIGH SCHOOL OR GED GRADUATED HIGH SCHOOL OR GED   BARRIERS PRIMARY LEARNER [address] [facility] CAREGIVER No No   PRIMARY LANGUAGE ENGLISH ENGLISH    NEED - No   LEARNER PREFERENCE PRIMARY DEMONSTRATION DEMONSTRATION   ANSWERED BY patient patient   RELATIONSHIP SELF SELF       Abuse Screening:  Abuse Screening Questionnaire 11/6/2018 7/3/2018 2/15/2016   Do you ever feel afraid of your partner? N N N   Are you in a relationship with someone who physically or mentally threatens you? N N N   Is it safe for you to go home? Gisella Paredes       Fall Risk  Fall Risk Assessment, last 12 mths 10/23/2018 10/19/2018 10/16/2018 10/8/2018 8/30/2018 8/24/2018 8/20/2018   Able to walk? Yes Yes Yes Yes Yes Yes Yes   Fall in past 12 months? No No No No No No No         Health Maintenance reviewed and discussed per provider.     Pt is due for   Health Maintenance Due   Topic    Hepatitis C Screening     Shingrix Vaccine Age 50> (1 of 2)    GLAUCOMA SCREENING Q2Y     Pneumococcal 65+ High/Highest Risk (1 of 2 - PCV13)    MEDICARE YEARLY EXAM     Influenza Age 5 to Adult     Please order/place referral if appropriate. Pt currently taking Antiplatelet therapy? yes      Coordination of Care:  1. Have you been to the ER, urgent care clinic since your last visit? Hospitalized since your last visit? no    2. Have you seen or consulted any other health care providers outside of the [address] [place] [address] since your last visit? Include any pap smears or colon screening. no    Please see Red banners under Allergies, Med rec, Immunizations to remove outside inquires. All correct information has been verified with patient and added to chart.

## 2018-11-09 ENCOUNTER — ANESTHESIA EVENT (OUTPATIENT)
Dept: SURGERY | Age: 63
End: 2018-11-09
Payer: MEDICARE

## 2018-11-12 ENCOUNTER — ANESTHESIA (OUTPATIENT)
Dept: SURGERY | Age: 63
End: 2018-11-12
Payer: MEDICARE

## 2018-11-12 ENCOUNTER — HOSPITAL ENCOUNTER (OUTPATIENT)
Age: 63
Setting detail: OBSERVATION
Discharge: HOME | End: 2018-11-13
Attending: UROLOGY | Admitting: UROLOGY
Payer: MEDICARE

## 2018-11-12 DIAGNOSIS — C61 PROSTATE CANCER (HCC): Primary | ICD-10-CM

## 2018-11-12 LAB
ABO + RH BLD: NORMAL
BLOOD GROUP ANTIBODIES SERPL: NORMAL
INR PPP: 1.1 (ref 0.8–1.2)
PROTHROMBIN TIME: 13.8 SEC (ref 11.5–15.2)
SPECIMEN EXP DATE BLD: NORMAL

## 2018-11-12 PROCEDURE — 77030002933 HC SUT MCRYL J&J -A: Performed by: UROLOGY

## 2018-11-12 PROCEDURE — 74011250636 HC RX REV CODE- 250/636: Performed by: NURSE ANESTHETIST, CERTIFIED REGISTERED

## 2018-11-12 PROCEDURE — 77030018813 HC SCIS LAPSCP EPIX DISP AMR -B: Performed by: UROLOGY

## 2018-11-12 PROCEDURE — 77030010507 HC ADH SKN DERMBND J&J -B: Performed by: UROLOGY

## 2018-11-12 PROCEDURE — 77030010545: Performed by: UROLOGY

## 2018-11-12 PROCEDURE — 77030010939 HC CLP LIG TELE -B: Performed by: UROLOGY

## 2018-11-12 PROCEDURE — 77030008771 HC TU NG SALEM SUMP -A: Performed by: ANESTHESIOLOGY

## 2018-11-12 PROCEDURE — 77030035048 HC TRCR ENDOSC OPTCL COVD -B: Performed by: UROLOGY

## 2018-11-12 PROCEDURE — 74011250637 HC RX REV CODE- 250/637: Performed by: UROLOGY

## 2018-11-12 PROCEDURE — 88304 TISSUE EXAM BY PATHOLOGIST: CPT

## 2018-11-12 PROCEDURE — 86901 BLOOD TYPING SEROLOGIC RH(D): CPT

## 2018-11-12 PROCEDURE — 74011250636 HC RX REV CODE- 250/636: Performed by: UROLOGY

## 2018-11-12 PROCEDURE — 85610 PROTHROMBIN TIME: CPT

## 2018-11-12 PROCEDURE — 88305 TISSUE EXAM BY PATHOLOGIST: CPT

## 2018-11-12 PROCEDURE — 74011250637 HC RX REV CODE- 250/637: Performed by: NURSE ANESTHETIST, CERTIFIED REGISTERED

## 2018-11-12 PROCEDURE — 74011250636 HC RX REV CODE- 250/636

## 2018-11-12 PROCEDURE — 77030035045 HC TRCR ENDOSC VRSPRT BLDLSS COVD -B: Performed by: UROLOGY

## 2018-11-12 PROCEDURE — 77030002966 HC SUT PDS J&J -A: Performed by: UROLOGY

## 2018-11-12 PROCEDURE — 77030018846 HC SOL IRR STRL H20 ICUM -A: Performed by: UROLOGY

## 2018-11-12 PROCEDURE — 77030034696 HC CATH URETH FOL 2W BARD -A: Performed by: UROLOGY

## 2018-11-12 PROCEDURE — 74011000250 HC RX REV CODE- 250: Performed by: UROLOGY

## 2018-11-12 PROCEDURE — 77030008462 HC STPLR SKN PROX J&J -A: Performed by: UROLOGY

## 2018-11-12 PROCEDURE — 77030013449 HC CLP LIG TELE -A: Performed by: UROLOGY

## 2018-11-12 PROCEDURE — 88307 TISSUE EXAM BY PATHOLOGIST: CPT

## 2018-11-12 PROCEDURE — 77030009848 HC PASSR SUT SET COOP -C: Performed by: UROLOGY

## 2018-11-12 PROCEDURE — 77030008477 HC STYL SATN SLP COVD -A: Performed by: ANESTHESIOLOGY

## 2018-11-12 PROCEDURE — 99218 HC RM OBSERVATION: CPT

## 2018-11-12 PROCEDURE — 77030022704 HC SUT VLOC COVD -B: Performed by: UROLOGY

## 2018-11-12 PROCEDURE — 76210000017 HC OR PH I REC 1.5 TO 2 HR: Performed by: UROLOGY

## 2018-11-12 PROCEDURE — 77030013079 HC BLNKT BAIR HGGR 3M -A: Performed by: ANESTHESIOLOGY

## 2018-11-12 PROCEDURE — 77030035684 HC CAP REDUC TRCR ENDOSC 1SEAL J&J -B: Performed by: UROLOGY

## 2018-11-12 PROCEDURE — 77030020263 HC SOL INJ SOD CL0.9% LFCR 1000ML: Performed by: UROLOGY

## 2018-11-12 PROCEDURE — 77030010935 HC CLP LIG ABSRB TELE -B: Performed by: UROLOGY

## 2018-11-12 PROCEDURE — 77030009852 HC PCH RTVR ENDOSC COVD -B: Performed by: UROLOGY

## 2018-11-12 PROCEDURE — 77030010513 HC APPL CLP LIG J&J -C: Performed by: UROLOGY

## 2018-11-12 PROCEDURE — 77030031139 HC SUT VCRL2 J&J -A: Performed by: UROLOGY

## 2018-11-12 PROCEDURE — 74011000250 HC RX REV CODE- 250

## 2018-11-12 PROCEDURE — 77030032490 HC SLV COMPR SCD KNE COVD -B: Performed by: UROLOGY

## 2018-11-12 PROCEDURE — 88309 TISSUE EXAM BY PATHOLOGIST: CPT

## 2018-11-12 PROCEDURE — 77030035277 HC OBTRTR BLDELSS DISP INTU -B: Performed by: UROLOGY

## 2018-11-12 PROCEDURE — 77030016151 HC PROTCTR LNS DFOG COVD -B: Performed by: UROLOGY

## 2018-11-12 PROCEDURE — 77030008683 HC TU ET CUF COVD -A: Performed by: ANESTHESIOLOGY

## 2018-11-12 PROCEDURE — 77030003028 HC SUT VCRL J&J -A: Performed by: UROLOGY

## 2018-11-12 PROCEDURE — 76060000036 HC ANESTHESIA 2.5 TO 3 HR: Performed by: UROLOGY

## 2018-11-12 PROCEDURE — C1729 CATH, DRAINAGE: HCPCS | Performed by: UROLOGY

## 2018-11-12 PROCEDURE — 76010000877 HC OR TIME 2.5 TO 3HR INTENSV - TIER 2: Performed by: UROLOGY

## 2018-11-12 RX ORDER — FENTANYL CITRATE 50 UG/ML
INJECTION, SOLUTION INTRAMUSCULAR; INTRAVENOUS AS NEEDED
Status: DISCONTINUED | OUTPATIENT
Start: 2018-11-12 | End: 2018-11-12 | Stop reason: HOSPADM

## 2018-11-12 RX ORDER — CLINDAMYCIN PHOSPHATE 900 MG/50ML
900 INJECTION INTRAVENOUS EVERY 8 HOURS
Status: DISCONTINUED | OUTPATIENT
Start: 2018-11-12 | End: 2018-11-13 | Stop reason: HOSPADM

## 2018-11-12 RX ORDER — OXYBUTYNIN CHLORIDE 5 MG/1
5 TABLET ORAL
Qty: 28 TAB | Refills: 0 | Status: SHIPPED | OUTPATIENT
Start: 2018-11-12 | End: 2018-11-24 | Stop reason: ALTCHOICE

## 2018-11-12 RX ORDER — EPHEDRINE SULFATE 50 MG/ML
INJECTION, SOLUTION INTRAVENOUS AS NEEDED
Status: DISCONTINUED | OUTPATIENT
Start: 2018-11-12 | End: 2018-11-12 | Stop reason: HOSPADM

## 2018-11-12 RX ORDER — SODIUM CHLORIDE 0.9 % (FLUSH) 0.9 %
5-10 SYRINGE (ML) INJECTION AS NEEDED
Status: DISCONTINUED | OUTPATIENT
Start: 2018-11-12 | End: 2018-11-12 | Stop reason: HOSPADM

## 2018-11-12 RX ORDER — SODIUM CHLORIDE 0.9 % (FLUSH) 0.9 %
5-10 SYRINGE (ML) INJECTION EVERY 8 HOURS
Status: DISCONTINUED | OUTPATIENT
Start: 2018-11-12 | End: 2018-11-13 | Stop reason: HOSPADM

## 2018-11-12 RX ORDER — MORPHINE SULFATE 2 MG/ML
2 INJECTION, SOLUTION INTRAMUSCULAR; INTRAVENOUS
Status: DISCONTINUED | OUTPATIENT
Start: 2018-11-12 | End: 2018-11-13 | Stop reason: HOSPADM

## 2018-11-12 RX ORDER — SODIUM CHLORIDE 0.9 % (FLUSH) 0.9 %
5-10 SYRINGE (ML) INJECTION AS NEEDED
Status: DISCONTINUED | OUTPATIENT
Start: 2018-11-12 | End: 2018-11-13 | Stop reason: HOSPADM

## 2018-11-12 RX ORDER — ALPRAZOLAM 0.5 MG/1
0.25 TABLET ORAL
Status: DISCONTINUED | OUTPATIENT
Start: 2018-11-12 | End: 2018-11-13 | Stop reason: HOSPADM

## 2018-11-12 RX ORDER — DOCUSATE SODIUM 100 MG/1
100 CAPSULE, LIQUID FILLED ORAL 2 TIMES DAILY
Qty: 60 CAP | Refills: 0 | Status: SHIPPED | OUTPATIENT
Start: 2018-11-12 | End: 2018-11-24 | Stop reason: ALTCHOICE

## 2018-11-12 RX ORDER — LIDOCAINE HYDROCHLORIDE 10 MG/ML
0.1 INJECTION, SOLUTION EPIDURAL; INFILTRATION; INTRACAUDAL; PERINEURAL AS NEEDED
Status: DISCONTINUED | OUTPATIENT
Start: 2018-11-12 | End: 2018-11-12 | Stop reason: HOSPADM

## 2018-11-12 RX ORDER — OXYBUTYNIN CHLORIDE 5 MG/1
5 TABLET ORAL
Status: DISCONTINUED | OUTPATIENT
Start: 2018-11-12 | End: 2018-11-13 | Stop reason: HOSPADM

## 2018-11-12 RX ORDER — ONDANSETRON 2 MG/ML
4 INJECTION INTRAMUSCULAR; INTRAVENOUS
Status: DISCONTINUED | OUTPATIENT
Start: 2018-11-12 | End: 2018-11-13 | Stop reason: HOSPADM

## 2018-11-12 RX ORDER — HYDROCODONE BITARTRATE AND ACETAMINOPHEN 5; 325 MG/1; MG/1
1 TABLET ORAL
Qty: 30 TAB | Refills: 0 | Status: SHIPPED | OUTPATIENT
Start: 2018-11-12 | End: 2018-11-24 | Stop reason: ALTCHOICE

## 2018-11-12 RX ORDER — BUTALBITAL, ACETAMINOPHEN AND CAFFEINE 300; 40; 50 MG/1; MG/1; MG/1
2 CAPSULE ORAL
Status: DISCONTINUED | OUTPATIENT
Start: 2018-11-12 | End: 2018-11-13 | Stop reason: HOSPADM

## 2018-11-12 RX ORDER — PANTOPRAZOLE SODIUM 20 MG/1
40 TABLET, DELAYED RELEASE ORAL
Status: DISCONTINUED | OUTPATIENT
Start: 2018-11-13 | End: 2018-11-13 | Stop reason: HOSPADM

## 2018-11-12 RX ORDER — SUCCINYLCHOLINE CHLORIDE 20 MG/ML
INJECTION INTRAMUSCULAR; INTRAVENOUS AS NEEDED
Status: DISCONTINUED | OUTPATIENT
Start: 2018-11-12 | End: 2018-11-12 | Stop reason: HOSPADM

## 2018-11-12 RX ORDER — SODIUM CHLORIDE, SODIUM LACTATE, POTASSIUM CHLORIDE, CALCIUM CHLORIDE 600; 310; 30; 20 MG/100ML; MG/100ML; MG/100ML; MG/100ML
125 INJECTION, SOLUTION INTRAVENOUS CONTINUOUS
Status: DISCONTINUED | OUTPATIENT
Start: 2018-11-12 | End: 2018-11-13 | Stop reason: HOSPADM

## 2018-11-12 RX ORDER — PROPOFOL 10 MG/ML
INJECTION, EMULSION INTRAVENOUS AS NEEDED
Status: DISCONTINUED | OUTPATIENT
Start: 2018-11-12 | End: 2018-11-12 | Stop reason: HOSPADM

## 2018-11-12 RX ORDER — LEVOFLOXACIN 5 MG/ML
750 INJECTION, SOLUTION INTRAVENOUS
Status: COMPLETED | OUTPATIENT
Start: 2018-11-12 | End: 2018-11-12

## 2018-11-12 RX ORDER — HYDROMORPHONE HYDROCHLORIDE 2 MG/ML
0.5 INJECTION, SOLUTION INTRAMUSCULAR; INTRAVENOUS; SUBCUTANEOUS AS NEEDED
Status: DISCONTINUED | OUTPATIENT
Start: 2018-11-12 | End: 2018-11-12 | Stop reason: HOSPADM

## 2018-11-12 RX ORDER — HEPARIN SODIUM 5000 [USP'U]/ML
5000 INJECTION, SOLUTION INTRAVENOUS; SUBCUTANEOUS
Status: COMPLETED | OUTPATIENT
Start: 2018-11-12 | End: 2018-11-12

## 2018-11-12 RX ORDER — OXYCODONE AND ACETAMINOPHEN 5; 325 MG/1; MG/1
1 TABLET ORAL
Status: DISCONTINUED | OUTPATIENT
Start: 2018-11-12 | End: 2018-11-13 | Stop reason: HOSPADM

## 2018-11-12 RX ORDER — DIPHENHYDRAMINE HYDROCHLORIDE 50 MG/ML
12.5 INJECTION, SOLUTION INTRAMUSCULAR; INTRAVENOUS
Status: DISCONTINUED | OUTPATIENT
Start: 2018-11-12 | End: 2018-11-13 | Stop reason: HOSPADM

## 2018-11-12 RX ORDER — CLINDAMYCIN PHOSPHATE 900 MG/50ML
INJECTION INTRAVENOUS AS NEEDED
Status: DISCONTINUED | OUTPATIENT
Start: 2018-11-12 | End: 2018-11-12 | Stop reason: HOSPADM

## 2018-11-12 RX ORDER — VECURONIUM BROMIDE FOR INJECTION 1 MG/ML
INJECTION, POWDER, LYOPHILIZED, FOR SOLUTION INTRAVENOUS AS NEEDED
Status: DISCONTINUED | OUTPATIENT
Start: 2018-11-12 | End: 2018-11-12 | Stop reason: HOSPADM

## 2018-11-12 RX ORDER — FENTANYL CITRATE 50 UG/ML
50 INJECTION, SOLUTION INTRAMUSCULAR; INTRAVENOUS AS NEEDED
Status: DISCONTINUED | OUTPATIENT
Start: 2018-11-12 | End: 2018-11-12 | Stop reason: HOSPADM

## 2018-11-12 RX ORDER — LABETALOL 100 MG/1
100 TABLET, FILM COATED ORAL EVERY 12 HOURS
Status: DISCONTINUED | OUTPATIENT
Start: 2018-11-12 | End: 2018-11-13 | Stop reason: HOSPADM

## 2018-11-12 RX ORDER — MIDAZOLAM HYDROCHLORIDE 1 MG/ML
INJECTION, SOLUTION INTRAMUSCULAR; INTRAVENOUS AS NEEDED
Status: DISCONTINUED | OUTPATIENT
Start: 2018-11-12 | End: 2018-11-12 | Stop reason: HOSPADM

## 2018-11-12 RX ORDER — HEPARIN SODIUM 5000 [USP'U]/ML
5000 INJECTION, SOLUTION INTRAVENOUS; SUBCUTANEOUS EVERY 8 HOURS
Status: DISCONTINUED | OUTPATIENT
Start: 2018-11-12 | End: 2018-11-13 | Stop reason: HOSPADM

## 2018-11-12 RX ORDER — ENOXAPARIN SODIUM 100 MG/ML
40 INJECTION SUBCUTANEOUS DAILY
Qty: 5 SYRINGE | Refills: 0 | Status: SHIPPED | OUTPATIENT
Start: 2018-11-12 | End: 2018-11-24 | Stop reason: ALTCHOICE

## 2018-11-12 RX ORDER — NEOSTIGMINE METHYLSULFATE 5 MG/5 ML
SYRINGE (ML) INTRAVENOUS AS NEEDED
Status: DISCONTINUED | OUTPATIENT
Start: 2018-11-12 | End: 2018-11-12 | Stop reason: HOSPADM

## 2018-11-12 RX ORDER — SODIUM CHLORIDE, SODIUM LACTATE, POTASSIUM CHLORIDE, CALCIUM CHLORIDE 600; 310; 30; 20 MG/100ML; MG/100ML; MG/100ML; MG/100ML
25 INJECTION, SOLUTION INTRAVENOUS CONTINUOUS
Status: DISCONTINUED | OUTPATIENT
Start: 2018-11-12 | End: 2018-11-12 | Stop reason: HOSPADM

## 2018-11-12 RX ORDER — ZOLPIDEM TARTRATE 5 MG/1
5 TABLET ORAL
Status: DISCONTINUED | OUTPATIENT
Start: 2018-11-12 | End: 2018-11-13 | Stop reason: HOSPADM

## 2018-11-12 RX ORDER — NALOXONE HYDROCHLORIDE 0.4 MG/ML
0.4 INJECTION, SOLUTION INTRAMUSCULAR; INTRAVENOUS; SUBCUTANEOUS AS NEEDED
Status: DISCONTINUED | OUTPATIENT
Start: 2018-11-12 | End: 2018-11-13 | Stop reason: HOSPADM

## 2018-11-12 RX ORDER — FAMOTIDINE 20 MG/1
20 TABLET, FILM COATED ORAL ONCE
Status: COMPLETED | OUTPATIENT
Start: 2018-11-12 | End: 2018-11-12

## 2018-11-12 RX ORDER — METHYLENE BLUE 10 MG/ML
INJECTION INTRAVENOUS AS NEEDED
Status: DISCONTINUED | OUTPATIENT
Start: 2018-11-12 | End: 2018-11-12 | Stop reason: HOSPADM

## 2018-11-12 RX ORDER — SODIUM CHLORIDE 0.9 % (FLUSH) 0.9 %
5-10 SYRINGE (ML) INJECTION EVERY 8 HOURS
Status: DISCONTINUED | OUTPATIENT
Start: 2018-11-12 | End: 2018-11-12 | Stop reason: HOSPADM

## 2018-11-12 RX ORDER — ONDANSETRON 2 MG/ML
4 INJECTION INTRAMUSCULAR; INTRAVENOUS ONCE
Status: DISCONTINUED | OUTPATIENT
Start: 2018-11-12 | End: 2018-11-12 | Stop reason: HOSPADM

## 2018-11-12 RX ORDER — GLYCOPYRROLATE 0.2 MG/ML
INJECTION INTRAMUSCULAR; INTRAVENOUS AS NEEDED
Status: DISCONTINUED | OUTPATIENT
Start: 2018-11-12 | End: 2018-11-12 | Stop reason: HOSPADM

## 2018-11-12 RX ORDER — ATROPA BELLADONNA AND OPIUM 16.2; 3 MG/1; MG/1
SUPPOSITORY RECTAL AS NEEDED
Status: DISCONTINUED | OUTPATIENT
Start: 2018-11-12 | End: 2018-11-12 | Stop reason: HOSPADM

## 2018-11-12 RX ORDER — HYDROMORPHONE HYDROCHLORIDE 2 MG/ML
INJECTION, SOLUTION INTRAMUSCULAR; INTRAVENOUS; SUBCUTANEOUS
Status: COMPLETED
Start: 2018-11-12 | End: 2018-11-12

## 2018-11-12 RX ORDER — ALBUTEROL SULFATE 0.83 MG/ML
2.5 SOLUTION RESPIRATORY (INHALATION)
Status: DISCONTINUED | OUTPATIENT
Start: 2018-11-12 | End: 2018-11-12 | Stop reason: HOSPADM

## 2018-11-12 RX ORDER — TRAMADOL HYDROCHLORIDE 50 MG/1
50 TABLET ORAL
COMMUNITY
End: 2018-11-13

## 2018-11-12 RX ORDER — DEXAMETHASONE SODIUM PHOSPHATE 4 MG/ML
INJECTION, SOLUTION INTRA-ARTICULAR; INTRALESIONAL; INTRAMUSCULAR; INTRAVENOUS; SOFT TISSUE AS NEEDED
Status: DISCONTINUED | OUTPATIENT
Start: 2018-11-12 | End: 2018-11-12 | Stop reason: HOSPADM

## 2018-11-12 RX ORDER — BUPIVACAINE HYDROCHLORIDE AND EPINEPHRINE 2.5; 5 MG/ML; UG/ML
INJECTION, SOLUTION EPIDURAL; INFILTRATION; INTRACAUDAL; PERINEURAL AS NEEDED
Status: DISCONTINUED | OUTPATIENT
Start: 2018-11-12 | End: 2018-11-12 | Stop reason: HOSPADM

## 2018-11-12 RX ORDER — LIDOCAINE HYDROCHLORIDE 20 MG/ML
INJECTION, SOLUTION EPIDURAL; INFILTRATION; INTRACAUDAL; PERINEURAL AS NEEDED
Status: DISCONTINUED | OUTPATIENT
Start: 2018-11-12 | End: 2018-11-12 | Stop reason: HOSPADM

## 2018-11-12 RX ORDER — HYDROMORPHONE HYDROCHLORIDE 1 MG/ML
INJECTION, SOLUTION INTRAMUSCULAR; INTRAVENOUS; SUBCUTANEOUS AS NEEDED
Status: DISCONTINUED | OUTPATIENT
Start: 2018-11-12 | End: 2018-11-12 | Stop reason: HOSPADM

## 2018-11-12 RX ADMIN — FAMOTIDINE 20 MG: 20 TABLET ORAL at 13:31

## 2018-11-12 RX ADMIN — DEXAMETHASONE SODIUM PHOSPHATE 4 MG: 4 INJECTION, SOLUTION INTRA-ARTICULAR; INTRALESIONAL; INTRAMUSCULAR; INTRAVENOUS; SOFT TISSUE at 15:13

## 2018-11-12 RX ADMIN — HYDROMORPHONE HYDROCHLORIDE 0.5 MG: 2 INJECTION, SOLUTION INTRAMUSCULAR; INTRAVENOUS; SUBCUTANEOUS at 17:38

## 2018-11-12 RX ADMIN — HYDROMORPHONE HYDROCHLORIDE 0.2 MG: 1 INJECTION, SOLUTION INTRAMUSCULAR; INTRAVENOUS; SUBCUTANEOUS at 15:51

## 2018-11-12 RX ADMIN — MIDAZOLAM HYDROCHLORIDE 2 MG: 1 INJECTION, SOLUTION INTRAMUSCULAR; INTRAVENOUS at 14:42

## 2018-11-12 RX ADMIN — MORPHINE SULFATE 2 MG: 2 INJECTION, SOLUTION INTRAMUSCULAR; INTRAVENOUS at 21:32

## 2018-11-12 RX ADMIN — VECURONIUM BROMIDE FOR INJECTION 2 MG: 1 INJECTION, POWDER, LYOPHILIZED, FOR SOLUTION INTRAVENOUS at 15:45

## 2018-11-12 RX ADMIN — OXYBUTYNIN CHLORIDE 5 MG: 5 TABLET ORAL at 18:36

## 2018-11-12 RX ADMIN — VECURONIUM BROMIDE FOR INJECTION 5 MG: 1 INJECTION, POWDER, LYOPHILIZED, FOR SOLUTION INTRAVENOUS at 14:55

## 2018-11-12 RX ADMIN — METHYLENE BLUE 5 ML: 10 INJECTION INTRAVENOUS at 16:16

## 2018-11-12 RX ADMIN — METHYLENE BLUE 5 ML: 10 INJECTION INTRAVENOUS at 16:09

## 2018-11-12 RX ADMIN — Medication 10 ML: at 21:27

## 2018-11-12 RX ADMIN — VECURONIUM BROMIDE FOR INJECTION 1 MG: 1 INJECTION, POWDER, LYOPHILIZED, FOR SOLUTION INTRAVENOUS at 16:15

## 2018-11-12 RX ADMIN — EPHEDRINE SULFATE 10 MG: 50 INJECTION, SOLUTION INTRAVENOUS at 15:20

## 2018-11-12 RX ADMIN — LIDOCAINE HYDROCHLORIDE 80 MG: 20 INJECTION, SOLUTION EPIDURAL; INFILTRATION; INTRACAUDAL; PERINEURAL at 14:45

## 2018-11-12 RX ADMIN — HEPARIN SODIUM 5000 UNITS: 5000 INJECTION INTRAVENOUS; SUBCUTANEOUS at 21:26

## 2018-11-12 RX ADMIN — Medication 3 MG: at 17:09

## 2018-11-12 RX ADMIN — FENTANYL CITRATE 100 MCG: 50 INJECTION, SOLUTION INTRAMUSCULAR; INTRAVENOUS at 14:45

## 2018-11-12 RX ADMIN — HYDROMORPHONE HYDROCHLORIDE 0.2 MG: 1 INJECTION, SOLUTION INTRAMUSCULAR; INTRAVENOUS; SUBCUTANEOUS at 16:28

## 2018-11-12 RX ADMIN — SODIUM CHLORIDE, SODIUM LACTATE, POTASSIUM CHLORIDE, AND CALCIUM CHLORIDE 25 ML/HR: 600; 310; 30; 20 INJECTION, SOLUTION INTRAVENOUS at 13:58

## 2018-11-12 RX ADMIN — HYDROMORPHONE HYDROCHLORIDE 0.5 MG: 2 INJECTION, SOLUTION INTRAMUSCULAR; INTRAVENOUS; SUBCUTANEOUS at 17:45

## 2018-11-12 RX ADMIN — SODIUM CHLORIDE, SODIUM LACTATE, POTASSIUM CHLORIDE, AND CALCIUM CHLORIDE: 600; 310; 30; 20 INJECTION, SOLUTION INTRAVENOUS at 17:01

## 2018-11-12 RX ADMIN — LEVOFLOXACIN 750 MG: 750 INJECTION, SOLUTION INTRAVENOUS at 14:48

## 2018-11-12 RX ADMIN — SUCCINYLCHOLINE CHLORIDE 100 MG: 20 INJECTION INTRAMUSCULAR; INTRAVENOUS at 14:45

## 2018-11-12 RX ADMIN — PROPOFOL 200 MG: 10 INJECTION, EMULSION INTRAVENOUS at 14:45

## 2018-11-12 RX ADMIN — GLYCOPYRROLATE 0.4 MG: 0.2 INJECTION INTRAMUSCULAR; INTRAVENOUS at 17:09

## 2018-11-12 RX ADMIN — SODIUM CHLORIDE, SODIUM LACTATE, POTASSIUM CHLORIDE, AND CALCIUM CHLORIDE 125 ML/HR: 600; 310; 30; 20 INJECTION, SOLUTION INTRAVENOUS at 20:00

## 2018-11-12 RX ADMIN — HYDROMORPHONE HYDROCHLORIDE 0.4 MG: 1 INJECTION, SOLUTION INTRAMUSCULAR; INTRAVENOUS; SUBCUTANEOUS at 16:57

## 2018-11-12 RX ADMIN — HEPARIN SODIUM 5000 UNITS: 5000 INJECTION INTRAVENOUS; SUBCUTANEOUS at 14:06

## 2018-11-12 RX ADMIN — CLINDAMYCIN PHOSPHATE 900 MG: 900 INJECTION INTRAVENOUS at 15:18

## 2018-11-12 RX ADMIN — HYDROMORPHONE HYDROCHLORIDE 0.2 MG: 1 INJECTION, SOLUTION INTRAMUSCULAR; INTRAVENOUS; SUBCUTANEOUS at 16:40

## 2018-11-12 RX ADMIN — SODIUM CHLORIDE, SODIUM LACTATE, POTASSIUM CHLORIDE, AND CALCIUM CHLORIDE: 600; 310; 30; 20 INJECTION, SOLUTION INTRAVENOUS at 14:42

## 2018-11-12 RX ADMIN — CLINDAMYCIN PHOSPHATE 900 MG: 900 INJECTION, SOLUTION INTRAVENOUS at 22:30

## 2018-11-12 RX ADMIN — LABETALOL HYDROCHLORIDE 100 MG: 100 TABLET, FILM COATED ORAL at 22:28

## 2018-11-12 RX ADMIN — HYDROMORPHONE HYDROCHLORIDE 0.5 MG: 2 INJECTION, SOLUTION INTRAMUSCULAR; INTRAVENOUS; SUBCUTANEOUS at 18:31

## 2018-11-12 RX ADMIN — HYDROMORPHONE HYDROCHLORIDE 0.5 MG: 2 INJECTION INTRAMUSCULAR; INTRAVENOUS; SUBCUTANEOUS at 17:45

## 2018-11-12 NOTE — ANESTHESIA POSTPROCEDURE EVALUATION
Procedure(s):  1. DaVinci laparoscopic radical prostatectomy (non nerve Sparing) 2 DaVinci laproscopic bilateral pelvic lymph node dissection  .     Anesthesia Post Evaluation      Multimodal analgesia: multimodal analgesia used between 6 hours prior to anesthesia start to PACU discharge  Patient location during evaluation: PACU  Patient participation: complete - patient participated  Level of consciousness: awake  Pain management: satisfactory to patient  Airway patency: patent  Anesthetic complications: no  Cardiovascular status: acceptable  Respiratory status: acceptable  Hydration status: acceptable        Visit Vitals  /82   Pulse 99   Temp 36.4 °C (97.5 °F)   Resp 14   Ht 5' 10\" (1.778 m)   Wt 100.7 kg (222 lb)   SpO2 92%   BMI 31.85 kg/m²

## 2018-11-12 NOTE — ANESTHESIA PREPROCEDURE EVALUATION
Anesthetic History   No history of anesthetic complications            Review of Systems / Medical History  Patient summary reviewed, nursing notes reviewed and pertinent labs reviewed    Pulmonary  Within defined limits                 Neuro/Psych             Comments: Rotatory cuff tear Cardiovascular    Hypertension: well controlled              Exercise tolerance: >4 METS: H/o dvt     GI/Hepatic/Renal     GERD: well controlled           Endo/Other        Arthritis     Other Findings              Physical Exam    Airway  Mallampati: II  TM Distance: 4 - 6 cm  Neck ROM: normal range of motion   Mouth opening: Normal     Cardiovascular  Regular rate and rhythm,  S1 and S2 normal,  no murmur, click, rub, or gallop  Rhythm: regular  Rate: normal         Dental  No notable dental hx       Pulmonary  Breath sounds clear to auscultation               Abdominal  GI exam deferred       Other Findings            Anesthetic Plan    ASA: 3  Anesthesia type: general          Induction: Intravenous  Anesthetic plan and risks discussed with: Patient

## 2018-11-12 NOTE — PROGRESS NOTES
TRANSFER - IN REPORT:    Verbal report received from [facility] on [employer]  being received from PACU for routine post - op      Report consisted of patients Situation, Background, Assessment and   Recommendations(SBAR). Information from the following report(s) SBAR, Procedure Summary and MAR was reviewed with the receiving nurse. Opportunity for questions and clarification was provided. 1915 Pt received via bed awake and alert in NAD. Lap sites to abdomen c/d/i, nuñez draining cloudy urine. Wearing O2 via n/c at 2L. Oriented to call bell, phone and IS with pt giving return demonstration. Family at [facility].

## 2018-11-12 NOTE — OP NOTES
DATE OF OPERATION:  11/12/18    PREOPERATIVE DIAGNOSIS:  Prostate cancer. POSTOPERATIVE DIAGNOSIS:  Prostate cancer. OPERATION PERFORMED:  1. DaVinci laparoscopic radical prostatectomy (non nerve Sparing)  2 DaVinci laproscopic bilateral pelvic lymph node dissection. SURGEON:  Efrem Van MD    ASSISTANT SURGEONS:  Jesica Miller    ANESTHESIA:  General.    SPECIMEN:  Prostate with bilateral seminal vesicals, bilateral pelvic lymph  nodes, anterior fat pad. FINDINGS:  Accessory Pudendal Vessel: none  Normal appearing prostate with small median lobe  Mildly enlarged LNs    INDICATION:  Pt.is a 69 yo male who presented with elevated PSA. Prostate biopsies showed [place] 3 + 4 cancer involving the both side of his prostate. Treatment options were discussed with him  at length and he chose DaVinci radical prostatectomy. He has  decreased erections and the plan is for non nerve sparing. Bilateral pelvic lymph node dissection was planned due to his risk stratification. PROCEDURE IN DETAIL:  Patient was given Ancef preoperatively. He had sequential  compression devices applied preoperatively for DVT prophylaxis. He was taken to the operating room where he was induced with  general anesthesia. After adequate anesthesia, he was placed in  the dorsal lithotomy position. His arms were draped by his side  and was appropriately padded and secured to the operating room  table. He was placed in the Trendelenburg position. Rectal exam showed prostate to be 30 cc without nodules. He was prepped and draped in sterile fashion. An 25 [place]ian Diallo  was placed in the bladder and instilled 20 cc sterile water. Orogastric tube was placed. The Veress needle was passed just  above the umbilicus and the abdomen was insufflated to 15  atmospheres. A 12 mm, blunt-tip trocar was placed just above  the umbilicus.   The zero-degree camera was passed within  this and the following trocars were placed under direct vision; 8  mm robotic trocars were placed 9 cm laterally and inferiorly to  the initially placed umbilical trocar. A third one was placed 7  cm lateral to the left-sided trocar. In the corresponding  position on the right side, a 12 mm trocar was placed, and then a  5 mm trocar was placed to the right and well above the umbilicus. The robot was then docked with the robot trocar. I used the  zero-degree camera. I had the hot scissors in the right hand  and the left hand with the Gyrus forceps and far left hand the  Cardier forceps. Initially I divided the median umbilical  ligament from the urachus and developed the space of Retzius down  to pubic bone. I divided the parietal peritoneum laterally up  to the vas deferens on each side. I used the Cardier forceps to  provide cranial traction on the urachus. I cleaned off the  Denonvilliers fascia on each side and then divided it with the  scissors laterally to the perirectal fat and medially to the  puboprostatic ligaments which were divided. I then ligated the  dorsal vein complex with a 2-0 Vicryl suture in a figure of eight fashion. I then addressed the bladder neck with a 30-degree down lens. I identified the bladder neck by pulling on the Diallo catheter. I divided the anterior bladder neck musculature until I then  found the anterior bladder neck mucosa which was incised. I  identified the Diallo catheter within, deflated the balloon,  pulled the Diallo out through this opening and then using the  Amaury-Damir needle with a #0-Vicryl suture, passed to the  suprapubic region and pulled the suture through the eye of the  Diallo and then back out. This allowed me to provide upward  traction on the prostate. I then divided the lateral bladder  neck mucosa and the posterior bladder neck mucosa. I was well  away from ureteral orifices. I divided the posterior bladder  neck musculature until we identified the vas deferens.   They were  freed proximally, then divided. I freed up the seminal vesicals  using blunt and sharp dissection. I placed clips on the vessels to the  seminal  vesicals and avoided cautery. I then went back to the 0-degree lens. I divided the  Denonvilliers fascia beneath the prostate and developed the  prostate off the rectum. I then did non nerve sparing by  dividing the lateral pelvic fascia dissecting off the prostate  capsule laterally. I then isolated the pedicles of the prostate  and placed weck clips on the pedicles of the prostate and then  divided it with cold scissors. I continued to divide the  neurovascular bundles off the prostate out to the apex of the  prostate. At this point the prostate was freed up except for the  urethra. I addressed the prostate anteriorly, divided the dorsal vein , then the  anterior urethral wall, pulled the Diallo catheter back and then divided the   posterior urethral wall. Specimen was completely freed up. I  placed the prostate in an Endo catch bag and then placed the bag in the upper abdomen out of the way. I  then irrigated the pelvis. The rectal test was negative. I then did the pelvic lymph node dissection by incising the  fascia overlying the right external iliac vein, dissecting  distally. I went just distal to the node of [place] where we placed clips  and then divided the lymphatics. The lateral aspect of the  dissection was the pelvic side wall, inferior was the obturator  nerve and proximal the hypogastric vessels. I placed clips at  the proximal aspect and then divided the lymphatics. This was  removed with the spoon grasper and sent to Pathology. Grossly  there were mildly enlarged lymph nodes. I then did the Left obturator lymph node dissection in the  same fashion as the left side and did see mildly enlarged nodes here. With good hemostasis, I then did the posterior reconstruction. I took a 3-0 VLoc suture on an RB1 needle .    I passed the  suture through the cut edges of Denonvilliers  fascia beneath the bladder on the right side and through the  posterior striated sphincter underneath the urethra. Then I ran  this from right to left. I then took the other end of the suture passing just proximal to the posterior  bladder neck in the midline and to the posterior striated sphincter  and ran this from right to left using 3 throws and reapproximated  these structures and then tied this suture to the other end of the  suture. I then did the anastomosis again with 3-0 VLoc suture on RB1  needle. I passed both ends of the suture from the  outside-in through the bladder neck  at the  6 o'clock position. I passed both through the urethral  stump from the inside-out in the corresponding position. I  reapproximated the bladder neck to the urethra. I then ran the  Left suture on the left side anastomosis to the 9 o'clock  position. Then I went back to the right sided suture and ran that up  the right side to the 12 o'clock position. I then continued the  left suture to the 12 o'clock position. Patient was given  indigo carmine prior to this and there was good efflux in both  ureteral orifices. I then placed a new 21 Liberian Diallo into the bladder and filled  it with 12 cc sterile water. I then secured the knot and then passed suture behind pubic bone for anterior suspension. I  irrigated the bladder with 200 cc. There was no leakage. There was reasonable hemostasis. The instruments were then removed. The robot was undocked and  all the trocars were removed under direct vision. There was good  hemostasis. I then enlarged the umbilical trocar site large  enough to remove the prostate and I closed the fascia here with  #0-PDS sutures in a running fashion. All  the port sites were irrigated. Marcaine 0.25% with epinephrine  was injected into all the trocar sites. The skin was closed with  4-0 Monocryl in running subcuticular fashion. Steri-Strips were  applied. At this point patient was awakened and extubated in the operating  room and taken to the recovery room in stable condition. There  were no complications. All counts correct. Estimated blood loss  was 100 cc.

## 2018-11-12 NOTE — PERIOP NOTES
Recd care of pt from OR via bed. Resp even and unlabored. Attached to monitor. VSS. OR, MAR and anesthesia report acknowledged. Will cont to monitor.

## 2018-11-12 NOTE — PROGRESS NOTES
Urology Progress Note        Assessment/Plan:     Active Problems:    * No active hospital problems. *      Status Post:  Procedure(s):  1. DaVinci laparoscopic radical prostatectomy (non nerve Sparing) 2 DaVinci laproscopic bilateral pelvic lymph node dissection       Plan:  Doing well and continue with treatment   PT will restart Coumadin with Lovenox bridge when he goes home    Subjective:     Daily Progress Note: 2018 5:50 PM    Romana Rogers is Day of Surgery and doing satisfactory. He reports pain is moderately controlled. He has no complaints. Indwelling catheter is draining well. Objective:     Visit Vitals  /68   Pulse 73   Temp 97.5 °F (36.4 °C)   Resp 20   Ht 5' 10\" (1.778 m)   Wt 222 lb (100.7 kg)   SpO2 98%   BMI 31.85 kg/m²        Temp (24hrs), Av.4 °F (36.3 °C), Min:97.3 °F (36.3 °C), Max:97.5 °F (36.4 °C)      Intake and Output:  No intake/output data recorded.    07 -  1900  In: 1000 [I.V.:1000]  Out: 450 [Urine:350]    Physical Exam:   General appearance: alert, cooperative, no distress, appears stated age  Abdomen: ND    Incision: clean and dry    Data Review:    Recent Results (from the past 24 hour(s))   TYPE & SCREEN    Collection Time: 18  1:45 PM   Result Value Ref Range    Crossmatch Expiration 11/15/2018     ABO/Rh(D) B POSITIVE     Antibody screen NEG    PROTHROMBIN TIME + INR    Collection Time: 18  1:55 PM   Result Value Ref Range    Prothrombin time 13.8 11.5 - 15.2 sec    INR 1.1 0.8 - 1.2           Signed By:     Gisela aHll MD   Urologic Oncologist  [facility] Dwayne Parks of U[facility]  [facility]  Office [phone] Ext [phone]                          2018

## 2018-11-13 VITALS
HEIGHT: 70 IN | OXYGEN SATURATION: 98 % | BODY MASS INDEX: 31.78 KG/M2 | WEIGHT: 222 LBS | SYSTOLIC BLOOD PRESSURE: 138 MMHG | TEMPERATURE: 98.2 F | DIASTOLIC BLOOD PRESSURE: 86 MMHG | RESPIRATION RATE: 16 BRPM | HEART RATE: 86 BPM

## 2018-11-13 LAB
ANION GAP SERPL CALC-SCNC: 10 MMOL/L (ref 3–18)
BUN SERPL-MCNC: 19 MG/DL (ref 7–18)
BUN/CREAT SERPL: 20 (ref 12–20)
CALCIUM SERPL-MCNC: 8.4 MG/DL (ref 8.5–10.1)
CHLORIDE SERPL-SCNC: 104 MMOL/L (ref 100–108)
CO2 SERPL-SCNC: 26 MMOL/L (ref 21–32)
CREAT SERPL-MCNC: 0.94 MG/DL (ref 0.6–1.3)
ERYTHROCYTE [DISTWIDTH] IN BLOOD BY AUTOMATED COUNT: 13.4 % (ref 11.6–14.5)
GLUCOSE SERPL-MCNC: 105 MG/DL (ref 74–99)
HCT VFR BLD AUTO: 36.4 % (ref 36–48)
HGB BLD-MCNC: 11.9 G/DL (ref 13–16)
MCH RBC QN AUTO: 31.8 PG (ref 24–34)
MCHC RBC AUTO-ENTMCNC: 32.7 G/DL (ref 31–37)
MCV RBC AUTO: 97.3 FL (ref 74–97)
PLATELET # BLD AUTO: 186 K/UL (ref 135–420)
PMV BLD AUTO: 11.3 FL (ref 9.2–11.8)
POTASSIUM SERPL-SCNC: 4.1 MMOL/L (ref 3.5–5.5)
RBC # BLD AUTO: 3.74 M/UL (ref 4.7–5.5)
SODIUM SERPL-SCNC: 140 MMOL/L (ref 136–145)
WBC # BLD AUTO: 9 K/UL (ref 4.6–13.2)

## 2018-11-13 PROCEDURE — 99218 HC RM OBSERVATION: CPT

## 2018-11-13 PROCEDURE — 85027 COMPLETE CBC AUTOMATED: CPT

## 2018-11-13 PROCEDURE — 36415 COLL VENOUS BLD VENIPUNCTURE: CPT

## 2018-11-13 PROCEDURE — 77030010545

## 2018-11-13 PROCEDURE — 80048 BASIC METABOLIC PNL TOTAL CA: CPT

## 2018-11-13 PROCEDURE — 74011250637 HC RX REV CODE- 250/637: Performed by: UROLOGY

## 2018-11-13 PROCEDURE — 74011250636 HC RX REV CODE- 250/636: Performed by: UROLOGY

## 2018-11-13 RX ADMIN — PANTOPRAZOLE SODIUM 40 MG: 20 TABLET, DELAYED RELEASE ORAL at 06:57

## 2018-11-13 RX ADMIN — OXYCODONE HYDROCHLORIDE AND ACETAMINOPHEN 1 TABLET: 5; 325 TABLET ORAL at 13:27

## 2018-11-13 RX ADMIN — LABETALOL HYDROCHLORIDE 100 MG: 100 TABLET, FILM COATED ORAL at 09:01

## 2018-11-13 RX ADMIN — CLINDAMYCIN PHOSPHATE 900 MG: 900 INJECTION, SOLUTION INTRAVENOUS at 06:58

## 2018-11-13 RX ADMIN — MORPHINE SULFATE 2 MG: 2 INJECTION, SOLUTION INTRAMUSCULAR; INTRAVENOUS at 05:13

## 2018-11-13 RX ADMIN — OXYCODONE HYDROCHLORIDE AND ACETAMINOPHEN 1 TABLET: 5; 325 TABLET ORAL at 04:00

## 2018-11-13 RX ADMIN — Medication 10 ML: at 05:17

## 2018-11-13 RX ADMIN — HEPARIN SODIUM 5000 UNITS: 5000 INJECTION INTRAVENOUS; SUBCUTANEOUS at 05:16

## 2018-11-13 RX ADMIN — SODIUM CHLORIDE, SODIUM LACTATE, POTASSIUM CHLORIDE, AND CALCIUM CHLORIDE 125 ML/HR: 600; 310; 30; 20 INJECTION, SOLUTION INTRAVENOUS at 04:08

## 2018-11-13 RX ADMIN — OXYBUTYNIN CHLORIDE 5 MG: 5 TABLET ORAL at 05:12

## 2018-11-13 RX ADMIN — OXYCODONE HYDROCHLORIDE AND ACETAMINOPHEN 1 TABLET: 5; 325 TABLET ORAL at 00:10

## 2018-11-13 RX ADMIN — OXYCODONE HYDROCHLORIDE AND ACETAMINOPHEN 1 TABLET: 5; 325 TABLET ORAL at 09:01

## 2018-11-13 NOTE — PROGRESS NOTES
conducted an initial consultation and Spiritual Assessment for Kris Do, who is a 70 y. o.,male. Patients Primary Language is: [place]. According to the patients EMR Uatsdin Affiliation is: [facility].     The reason the Patient came to the hospital is:   Patient Active Problem List    Diagnosis Date Noted    Prostate CA [facility]) 11/12/2018    Prostate cancer ([address] 10/08/2018    Arthritis of knee 03/27/2018    Primary osteoarthritis of left knee 02/01/2018    Muscle spasm of back 03/07/2017    Warfarin anticoagulation 01/25/2017    Cervical facet joint syndrome 12/06/2016    Muscle spasm 12/06/2016    Myofascial pain 06/20/2016    Abdominal bloating 05/09/2016    Lumbar spinal stenosis 03/22/2016    Bronchitis 03/07/2016    Chronic tension-type headache, not intractable 03/07/2016    Essential hypertension 12/10/2015    Facet arthropathy, lumbar 10/06/2015    DDD (degenerative disc disease), lumbar 10/06/2015    Esophageal reflux     Pure hypercholesterolemia     Personal history of venous thrombosis and embolism         The  provided the following Interventions:  Initiated a relationship of care and support. Provided information about Spiritual Care Services. Offered prayer and assurance of continued prayers on patient's behalf. The following outcomes were achieved:  Patient expressed gratitude for 's visit. Assessment:  There are no further spiritual or Islam issues which require intervention at this time. Plan:  Chaplains will continue to follow and will provide pastoral care on an as needed/requested basis. Antwon Roman M.Div.   , 1948 [facility]: [phone]/[facility]: [phone]

## 2018-11-13 NOTE — PROGRESS NOTES
Urology Progress Note        Assessment/Plan:     Active Problems:    Prostate CA ([address] (2018)        Status Post:  Procedure(s):  1. DaVinci laparoscopic radical prostatectomy (non nerve Sparing) 2 DaVinci laproscopic bilateral pelvic lymph node dissection       Plan:  Doing well and continue with treatment  Discharge home    Subjective:     Daily Progress Note: 2018 8:01 AM    Delia Galo is 1 Day Post-Op and doing satisfactory. He reports pain is well controlled. He has no complaints. He is tolerating clear liquids and ambulating with assistance. Indwelling catheter is draining well. Objective:     Visit Vitals  /80 (BP 1 Location: Left arm, BP Patient Position: At rest)   Pulse 84   Temp 98.4 °F (36.9 °C)   Resp 16   Ht 5' 10\" (1.778 m)   Wt 222 lb (100.7 kg)   SpO2 97%   BMI 31.85 kg/m²        Temp (24hrs), Av.7 °F (36.5 °C), Min:97.3 °F (36.3 °C), Max:98.4 °F (36.9 °C)      Intake and Output:   1901 -  0700  In: 3784.6 [P.O.:720; I.V.:3064.6]  Out: 1750 [Urine:1650]  No intake/output data recorded. Physical Exam:   General appearance: alert, cooperative, no distress, appears stated age  Abdomen: soft, non-tender.  Bowel sounds normal. No masses,  no organomegaly    Incision: clean and dry    Data Review:    Recent Results (from the past 24 hour(s))   TYPE & SCREEN    Collection Time: 18  1:45 PM   Result Value Ref Range    Crossmatch Expiration 11/15/2018     ABO/Rh(D) B POSITIVE     Antibody screen NEG    PROTHROMBIN TIME + INR    Collection Time: 18  1:55 PM   Result Value Ref Range    Prothrombin time 13.8 11.5 - 15.2 sec    INR 1.1 0.8 - 1.2     METABOLIC PANEL, BASIC    Collection Time: 18  4:37 AM   Result Value Ref Range    Sodium 140 136 - 145 mmol/L    Potassium 4.1 3.5 - 5.5 mmol/L    Chloride 104 100 - 108 mmol/L    CO2 26 21 - 32 mmol/L    Anion gap 10 3.0 - 18 mmol/L    Glucose 105 (H) 74 - 99 mg/dL    BUN 19 (H) 7.0 - 18 MG/DL Creatinine 0.94 0.6 - 1.3 MG/DL    BUN/Creatinine ratio 20 12 - 20      GFR est AA >60 >60 ml/min/1.73m2    GFR est non-AA >60 >60 ml/min/1.73m2    Calcium 8.4 (L) 8.5 - 10.1 MG/DL   CBC W/O DIFF    Collection Time: 11/13/18  4:37 AM   Result Value Ref Range    WBC 9.0 4.6 - 13.2 K/uL    RBC 3.74 (L) 4.70 - 5.50 M/uL    HGB 11.9 (L) 13.0 - 16.0 g/dL    HCT 36.4 36.0 - 48.0 %    MCV 97.3 (H) 74.0 - 97.0 FL    MCH 31.8 24.0 - 34.0 PG    MCHC 32.7 31.0 - 37.0 g/dL    RDW 13.4 11.6 - 14.5 %    PLATELET 563 360 - 219 K/uL    MPV 11.3 9.2 - 11.8 FL         Signed By:     Cata Ibanez MD   Urologic Oncologist  [facility] Colleen [facility] Urology  [facility]  Office [phone] Ext [phone]                          November 13, 2018

## 2018-11-13 NOTE — PROGRESS NOTES
Care Management Interventions  PCP Verified by CM: Yes  Palliative Care Criteria Met (RRAT>21 & CHF Dx)?: No  Transition of Care Consult (CM Consult): Discharge Planning  Discharge Durable Medical Equipment: No  Physical Therapy Consult: No  Occupational Therapy Consult: No  Speech Therapy Consult: No  Current Support Network: Lives with Spouse  Confirm Follow Up Transport: Family  Plan discussed with Pt/Family/Caregiver: Yes  Discharge Location  Discharge Placement: Home     Reason for Admission:   DaVinci laparoscopic radical prostatectomy (non nerve Sparing) 2 DaVinci laproscopic bilateral pelvic lymph node dissection       RRAT Score:     green                Plan for utilizing home health: If recommended. Likelihood of Readmission:  green                         Transition of Care Plan:                    Spoke with patient in room, He is independent with ADL's and has walker and cane at home. He verified his address, PCP, insurance and phone # as correct on the facesheet. Patient has designated __spouse______________________ to participate in his/her discharge plan and to receive any needed information. Lynda Churchey  [phone] He plans to return home upon discharge and transportation will be provided by family. Patient and/or next of kin has been given and has signed the [facility] Outpatient Observation  Notification letter and all questions answered. Copy of this notice given to patient and copy placed on chart. Patient and/or next of kin has been given the Outpatient Observation Information and Notification letter and all questions answered.

## 2018-11-13 NOTE — DISCHARGE SUMMARY
Discharge Summary     Patient ID:  Amee Tee  [id number]   52 y.o.  1955    Admit date: 11/12/2018    Discharge Date: 11/13/2018      Admitting Physician: Evy Booth MD     Discharge Physician: Evy Booth MD    Admission Diagnoses: c61 prostate cancer;Prostate CA [facility])    Last Procedure: Procedure(s):  1. DaVinci laparoscopic radical prostatectomy (non nerve Sparing) 2 DaVinci laproscopic bilateral pelvic lymph node dissection      Discharge Diagnoses: Active Problems:    Prostate CA ([address] (11/12/2018)         Consults: None    RELAVENT LABS ( within last 72 hrs):    Recent Results (from the past 72 hour(s))   TYPE & SCREEN    Collection Time: 11/12/18  1:45 PM   Result Value Ref Range    Crossmatch Expiration 11/15/2018     ABO/Rh(D) B POSITIVE     Antibody screen NEG    PROTHROMBIN TIME + INR    Collection Time: 11/12/18  1:55 PM   Result Value Ref Range    Prothrombin time 13.8 11.5 - 15.2 sec    INR 1.1 0.8 - 1.2     METABOLIC PANEL, BASIC    Collection Time: 11/13/18  4:37 AM   Result Value Ref Range    Sodium 140 136 - 145 mmol/L    Potassium 4.1 3.5 - 5.5 mmol/L    Chloride 104 100 - 108 mmol/L    CO2 26 21 - 32 mmol/L    Anion gap 10 3.0 - 18 mmol/L    Glucose 105 (H) 74 - 99 mg/dL    BUN 19 (H) 7.0 - 18 MG/DL    Creatinine 0.94 0.6 - 1.3 MG/DL    BUN/Creatinine ratio 20 12 - 20      GFR est AA >60 >60 ml/min/1.73m2    GFR est non-AA >60 >60 ml/min/1.73m2    Calcium 8.4 (L) 8.5 - 10.1 MG/DL   CBC W/O DIFF    Collection Time: 11/13/18  4:37 AM   Result Value Ref Range    WBC 9.0 4.6 - 13.2 K/uL    RBC 3.74 (L) 4.70 - 5.50 M/uL    HGB 11.9 (L) 13.0 - 16.0 g/dL    HCT 36.4 36.0 - 48.0 %    MCV 97.3 (H) 74.0 - 97.0 FL    MCH 31.8 24.0 - 34.0 PG    MCHC 32.7 31.0 - 37.0 g/dL    RDW 13.4 11.6 - 14.5 %    PLATELET 962 350 - 993 K/uL    MPV 11.3 9.2 - 11.8 FL       Significant Diagnostic Studies: labs: WNL     Hospital Course:   Normal hospital course for this procedure.     Condition on Discharge: Satisfactory    Disposition: Home    Patient Instructions:   Current Discharge Medication List      START taking these medications    Details   !! HYDROcodone-acetaminophen (NORCO) 5-325 mg per tablet Take 1 Tab by mouth every four (4) hours as needed for Pain. Max Daily Amount: 6 Tabs. Qty: 30 Tab, Refills: 0      docusate sodium (COLACE) 100 mg capsule Take 1 Cap by mouth two (2) times a day for 30 days. Qty: 60 Cap, Refills: 0      oxybutynin (DITROPAN) 5 mg tablet Take 1 Tab by mouth every six (6) hours as needed. Do not take after 10 pm Sunday 11/18/18  Qty: 28 Tab, Refills: 0      enoxaparin (LOVENOX) 40 mg/0.4 mL 0.4 mL by SubCUTAneous route daily. Qty: 5 Syringe, Refills: 0       !! - Potential duplicate medications found. Please discuss with provider. CONTINUE these medications which have NOT CHANGED    Details   celecoxib (CELEBREX) 200 mg capsule TAKE 1 CAPSULE BY MOUTH TWICE DAILY  Qty: 180 Cap, Refills: 0      lisinopril-hydroCHLOROthiazide (PRINZIDE, ZESTORETIC) 20-12.5 mg per tablet TAKE 1 TABLET BY MOUTH DAILY  Qty: 90 Tab, Refills: 0      diclofenac (VOLTAREN) 1 % gel Apply 4 g to affected area four (4) times daily. Maximum 16 grams per joint per day.  Dispense 5 100 gram tubes  Qty: 5 Each, Refills: 0    Associated Diagnoses: Iliotibial band tendinitis of left side      raNITIdine (ZANTAC) 150 mg tablet TK 1 T PO HS  Refills: 1      labetalol (NORMODYNE) 100 mg tablet TAKE ONE TABLET BY MOUTH TWICE DAILY  Qty: 180 Tab, Refills: 0      lansoprazole (PREVACID) 30 mg capsule TAKE 1 CAPSULE DAILY BEFOREBREAKFAST  Qty: 90 Cap, Refills: 3    Associated Diagnoses: Gastroesophageal reflux disease without esophagitis      montelukast (SINGULAIR) 10 mg tablet TAKE 1 TABLET BY MOUTH EVERY DAY  Qty: 90 Tab, Refills: 0      acetaminophen (TYLENOL) 500 mg tablet Take 1,000 mg by mouth every six (6) hours as needed for Pain.      !! HYDROcodone-acetaminophen (NORCO) 5-325 mg per tablet Take 1 Tab by mouth every six (6) hours as needed for Pain. Max Daily Amount: 4 Tabs. Qty: 30 Tab, Refills: 0    Associated Diagnoses: Complete tear of right rotator cuff      ALPRAZolam (XANAX) 0.5 mg tablet Take one half(1/2) tab to one(1) tab by mouth at bedtime as needed for sleep  Qty: 30 Tab, Refills: 0    Associated Diagnoses: Primary insomnia      !! warfarin (COUMADIN) 5 mg tablet TAKE 2 AND 1/2 TABLETS BY MOUTH DAILY OR AS DIRECTED  Qty: 75 Tab, Refills: 0      butalbital-acetaminophen-caff (FIORICET) -40 mg per capsule Take 2 capsules every 8 hours as needed for headache  Qty: 126 Cap, Refills: 1      !! warfarin (COUMADIN) 3 mg tablet Or as directed  Qty: 30 Tab, Refills: 3      metaxalone (SKELAXIN) 800 mg tablet Take 1 Tab by mouth three (3) times daily. Indications: Muscle Spasm  Qty: 90 Tab, Refills: 2    Associated Diagnoses: Muscle spasm       !! - Potential duplicate medications found. Please discuss with provider. STOP taking these medications       traMADol (ULTRAM) 50 mg tablet Comments:   Reason for Stopping:         tamsulosin (FLOMAX) 0.4 mg capsule Comments:   Reason for Stopping:               Diet: Reference my discharge instructions. Activity: Reference my discharge instructions. Follow-up Appointments   Procedures    FOLLOW UP VISIT Appointment in: One Week     Standing Status:   Standing     Number of Occurrences:   1     Standing Expiration Date:   11/13/2018     Order Specific Question:   Appointment in     Answer:    One Week            Signed:  Essence Coley MD  November 13, 2018  8:02 AM     Jennifer Vo MD   Urologic Oncologist  [facility] and Ai Vazquez  Professor of Urology  [facility]  Office [phone] Ext [phone]

## 2018-11-13 NOTE — ROUTINE PROCESS
I have reviewed discharge instructions with the patient. The patient verbalized understanding.   States ride will be here around Johny

## 2018-11-13 NOTE — PROGRESS NOTES
2000 Patient received from PACU via bed. Patient alert & oriented x4. Call light in reach & side rails up x3. Family with patient. Diallo catheter intact draining light brown urine.

## 2018-11-13 NOTE — PROGRESS NOTES
0720 Received pt in bed, On O2 at 2L/min, pt not wearing O2 at home, will recheck O2 later without it. Lap sites c/d/i. Pt not passing gas yet. Pt on clears, no n/v. Has nuñez draining green output, ambulated in the hallway per outgoing RN, pt in pain after, pain medicated by night RN. IV site no sign of infiltration. 5664 On regular diet for lunch. Discharge order in place. 1230 Tolerated lunch, no n/v.     1250 Ambulated in the hallway, wife standby assist. No SOB, tolerated well.    1300 On recliner, reviewed with pt and wife nuñez care and leg bag teaching, verbalized understanding. Pain medicated. Lap sites c/d/i, skin is intact. [place] down.

## 2018-11-13 NOTE — PROGRESS NOTES
0017 Ambulated in hallway with assistance. Tolerated it well. Assisted back to bed. Using ICS, could get it up to 1500.

## 2018-11-13 NOTE — PROGRESS NOTES
Problem: Falls - Risk of  Goal: *Absence of Falls  Document Jj Fall Risk and appropriate interventions in the flowsheet.   Outcome: Progressing Towards Goal  Fall Risk Interventions:  Mobility Interventions: Patient to call before getting OOB         Medication Interventions: Patient to call before getting OOB, Teach patient to arise slowly    Elimination Interventions: Call light in reach, Patient to call for help with toileting needs

## 2018-11-13 NOTE — PROGRESS NOTES
Bedside and Verbal shift change [facility] nurse[facility] by Landon Anderson RN   (offgoing nurse). Report included the following information SBAR, Kardex and MAR.

## 2018-11-14 ENCOUNTER — HOSPITAL ENCOUNTER (EMERGENCY)
Age: 63
Discharge: HOME | End: 2018-11-14
Attending: EMERGENCY MEDICINE
Payer: MEDICARE

## 2018-11-14 ENCOUNTER — APPOINTMENT (OUTPATIENT)
Dept: CT IMAGING | Age: 63
End: 2018-11-14
Attending: EMERGENCY MEDICINE
Payer: MEDICARE

## 2018-11-14 ENCOUNTER — HOSPITAL ENCOUNTER (OUTPATIENT)
Age: 63
Setting detail: OBSERVATION
LOS: 1 days | Discharge: HOME | End: 2018-11-15
Attending: UROLOGY | Admitting: UROLOGY
Payer: MEDICARE

## 2018-11-14 VITALS
TEMPERATURE: 99.1 F | WEIGHT: 222 LBS | RESPIRATION RATE: 16 BRPM | OXYGEN SATURATION: 93 % | BODY MASS INDEX: 31.78 KG/M2 | SYSTOLIC BLOOD PRESSURE: 168 MMHG | HEART RATE: 96 BPM | DIASTOLIC BLOOD PRESSURE: 88 MMHG | HEIGHT: 70 IN

## 2018-11-14 DIAGNOSIS — R10.30 LOWER ABDOMINAL PAIN: Primary | ICD-10-CM

## 2018-11-14 DIAGNOSIS — K59.00 CONSTIPATION, UNSPECIFIED CONSTIPATION TYPE: ICD-10-CM

## 2018-11-14 PROBLEM — C61 MALIGNANT NEOPLASM OF PROSTATE (HCC): Status: ACTIVE | Noted: 2018-11-14

## 2018-11-14 LAB
ALBUMIN SERPL-MCNC: 3.4 G/DL (ref 3.4–5)
ALBUMIN/GLOB SERPL: 1 {RATIO} (ref 0.8–1.7)
ALP SERPL-CCNC: 52 U/L (ref 45–117)
ALT SERPL-CCNC: 26 U/L (ref 16–61)
ANION GAP SERPL CALC-SCNC: 8 MMOL/L (ref 3–18)
APPEARANCE UR: CLEAR
AST SERPL-CCNC: 20 U/L (ref 15–37)
BASOPHILS # BLD: 0 K/UL (ref 0–0.1)
BASOPHILS NFR BLD: 0 % (ref 0–2)
BILIRUB SERPL-MCNC: 0.6 MG/DL (ref 0.2–1)
BILIRUB UR QL: NEGATIVE
BUN SERPL-MCNC: 11 MG/DL (ref 7–18)
BUN/CREAT SERPL: 12 (ref 12–20)
CALCIUM SERPL-MCNC: 8.9 MG/DL (ref 8.5–10.1)
CHLORIDE SERPL-SCNC: 99 MMOL/L (ref 100–108)
CO2 SERPL-SCNC: 28 MMOL/L (ref 21–32)
COLOR UR: YELLOW
CREAT SERPL-MCNC: 0.9 MG/DL (ref 0.6–1.3)
DIFFERENTIAL METHOD BLD: ABNORMAL
EOSINOPHIL # BLD: 0 K/UL (ref 0–0.4)
EOSINOPHIL NFR BLD: 0 % (ref 0–5)
EPITH CASTS URNS QL MICRO: NORMAL /LPF (ref 0–5)
ERYTHROCYTE [DISTWIDTH] IN BLOOD BY AUTOMATED COUNT: 12.8 % (ref 11.6–14.5)
GLOBULIN SER CALC-MCNC: 3.3 G/DL (ref 2–4)
GLUCOSE SERPL-MCNC: 125 MG/DL (ref 74–99)
GLUCOSE UR STRIP.AUTO-MCNC: NEGATIVE MG/DL
HCT VFR BLD AUTO: 40.3 % (ref 36–48)
HGB BLD-MCNC: 13.5 G/DL (ref 13–16)
HGB UR QL STRIP: ABNORMAL
INR PPP: 1.1 (ref 0.8–1.2)
KETONES UR QL STRIP.AUTO: NEGATIVE MG/DL
LACTATE BLD-SCNC: 0.78 MMOL/L (ref 0.4–2)
LEUKOCYTE ESTERASE UR QL STRIP.AUTO: ABNORMAL
LYMPHOCYTES # BLD: 0.9 K/UL (ref 0.9–3.6)
LYMPHOCYTES NFR BLD: 8 % (ref 21–52)
MCH RBC QN AUTO: 32.1 PG (ref 24–34)
MCHC RBC AUTO-ENTMCNC: 33.5 G/DL (ref 31–37)
MCV RBC AUTO: 96 FL (ref 74–97)
MONOCYTES # BLD: 1.3 K/UL (ref 0.05–1.2)
MONOCYTES NFR BLD: 11 % (ref 3–10)
NEUTS SEG # BLD: 9.2 K/UL (ref 1.8–8)
NEUTS SEG NFR BLD: 81 % (ref 40–73)
NITRITE UR QL STRIP.AUTO: NEGATIVE
PH UR STRIP: 6.5 [PH] (ref 5–8)
PLATELET # BLD AUTO: 182 K/UL (ref 135–420)
PMV BLD AUTO: 10.5 FL (ref 9.2–11.8)
POTASSIUM SERPL-SCNC: 3.6 MMOL/L (ref 3.5–5.5)
PROT SERPL-MCNC: 6.7 G/DL (ref 6.4–8.2)
PROT UR STRIP-MCNC: NEGATIVE MG/DL
PROTHROMBIN TIME: 14 SEC (ref 11.5–15.2)
RBC # BLD AUTO: 4.2 M/UL (ref 4.7–5.5)
RBC #/AREA URNS HPF: NORMAL /HPF (ref 0–5)
SODIUM SERPL-SCNC: 135 MMOL/L (ref 136–145)
SP GR UR REFRACTOMETRY: 1.02 (ref 1–1.03)
UROBILINOGEN UR QL STRIP.AUTO: 0.2 EU/DL (ref 0.2–1)
WBC # BLD AUTO: 11.4 K/UL (ref 4.6–13.2)
WBC URNS QL MICRO: NORMAL /HPF (ref 0–4)

## 2018-11-14 PROCEDURE — 74011636320 HC RX REV CODE- 636/320: Performed by: EMERGENCY MEDICINE

## 2018-11-14 PROCEDURE — 83605 ASSAY OF LACTIC ACID: CPT

## 2018-11-14 PROCEDURE — 74011250637 HC RX REV CODE- 250/637: Performed by: UROLOGY

## 2018-11-14 PROCEDURE — 99218 HC RM OBSERVATION: CPT

## 2018-11-14 PROCEDURE — 81001 URINALYSIS AUTO W/SCOPE: CPT

## 2018-11-14 PROCEDURE — 96376 TX/PRO/DX INJ SAME DRUG ADON: CPT

## 2018-11-14 PROCEDURE — 96374 THER/PROPH/DIAG INJ IV PUSH: CPT

## 2018-11-14 PROCEDURE — 85610 PROTHROMBIN TIME: CPT

## 2018-11-14 PROCEDURE — 96360 HYDRATION IV INFUSION INIT: CPT

## 2018-11-14 PROCEDURE — 74011250636 HC RX REV CODE- 250/636: Performed by: EMERGENCY MEDICINE

## 2018-11-14 PROCEDURE — 99283 EMERGENCY DEPT VISIT LOW MDM: CPT

## 2018-11-14 PROCEDURE — 74011250636 HC RX REV CODE- 250/636: Performed by: UROLOGY

## 2018-11-14 PROCEDURE — 36415 COLL VENOUS BLD VENIPUNCTURE: CPT

## 2018-11-14 PROCEDURE — 74177 CT ABD & PELVIS W/CONTRAST: CPT

## 2018-11-14 PROCEDURE — 96375 TX/PRO/DX INJ NEW DRUG ADDON: CPT

## 2018-11-14 PROCEDURE — 96361 HYDRATE IV INFUSION ADD-ON: CPT

## 2018-11-14 PROCEDURE — 77030027138 HC INCENT SPIROMETER -A

## 2018-11-14 PROCEDURE — 85025 COMPLETE CBC W/AUTO DIFF WBC: CPT

## 2018-11-14 PROCEDURE — 96372 THER/PROPH/DIAG INJ SC/IM: CPT

## 2018-11-14 PROCEDURE — 80053 COMPREHEN METABOLIC PANEL: CPT

## 2018-11-14 RX ORDER — LABETALOL 100 MG/1
100 TABLET, FILM COATED ORAL 2 TIMES DAILY
Status: DISCONTINUED | OUTPATIENT
Start: 2018-11-14 | End: 2018-11-15 | Stop reason: HOSPADM

## 2018-11-14 RX ORDER — ADHESIVE BANDAGE
30 BANDAGE TOPICAL
Status: DISCONTINUED | OUTPATIENT
Start: 2018-11-14 | End: 2018-11-14 | Stop reason: HOSPADM

## 2018-11-14 RX ORDER — MONTELUKAST SODIUM 10 MG/1
10 TABLET ORAL
Status: DISCONTINUED | OUTPATIENT
Start: 2018-11-14 | End: 2018-11-15 | Stop reason: HOSPADM

## 2018-11-14 RX ORDER — ENOXAPARIN SODIUM 100 MG/ML
40 INJECTION SUBCUTANEOUS EVERY 24 HOURS
Status: DISCONTINUED | OUTPATIENT
Start: 2018-11-14 | End: 2018-11-15 | Stop reason: HOSPADM

## 2018-11-14 RX ORDER — ONDANSETRON 2 MG/ML
4 INJECTION INTRAMUSCULAR; INTRAVENOUS
Status: DISCONTINUED | OUTPATIENT
Start: 2018-11-14 | End: 2018-11-15 | Stop reason: HOSPADM

## 2018-11-14 RX ORDER — SODIUM CHLORIDE 0.9 % (FLUSH) 0.9 %
5-10 SYRINGE (ML) INJECTION AS NEEDED
Status: DISCONTINUED | OUTPATIENT
Start: 2018-11-14 | End: 2018-11-14 | Stop reason: HOSPADM

## 2018-11-14 RX ORDER — ONDANSETRON 2 MG/ML
4 INJECTION INTRAMUSCULAR; INTRAVENOUS
Status: COMPLETED | OUTPATIENT
Start: 2018-11-14 | End: 2018-11-14

## 2018-11-14 RX ORDER — ALPRAZOLAM 0.5 MG/1
0.25 TABLET ORAL
Status: DISCONTINUED | OUTPATIENT
Start: 2018-11-14 | End: 2018-11-15 | Stop reason: HOSPADM

## 2018-11-14 RX ORDER — OXYBUTYNIN CHLORIDE 5 MG/1
5 TABLET ORAL
Status: DISCONTINUED | OUTPATIENT
Start: 2018-11-14 | End: 2018-11-14 | Stop reason: HOSPADM

## 2018-11-14 RX ORDER — NALOXONE HYDROCHLORIDE 0.4 MG/ML
0.4 INJECTION, SOLUTION INTRAMUSCULAR; INTRAVENOUS; SUBCUTANEOUS AS NEEDED
Status: DISCONTINUED | OUTPATIENT
Start: 2018-11-14 | End: 2018-11-14 | Stop reason: HOSPADM

## 2018-11-14 RX ORDER — LISINOPRIL 20 MG/1
20 TABLET ORAL DAILY
Status: DISCONTINUED | OUTPATIENT
Start: 2018-11-15 | End: 2018-11-15 | Stop reason: HOSPADM

## 2018-11-14 RX ORDER — LISINOPRIL AND HYDROCHLOROTHIAZIDE 12.5; 2 MG/1; MG/1
1 TABLET ORAL DAILY
Status: DISCONTINUED | OUTPATIENT
Start: 2018-11-15 | End: 2018-11-14

## 2018-11-14 RX ORDER — HYDROCODONE BITARTRATE AND ACETAMINOPHEN 5; 325 MG/1; MG/1
1 TABLET ORAL
Status: DISCONTINUED | OUTPATIENT
Start: 2018-11-14 | End: 2018-11-15 | Stop reason: HOSPADM

## 2018-11-14 RX ORDER — SODIUM CHLORIDE, SODIUM LACTATE, POTASSIUM CHLORIDE, CALCIUM CHLORIDE 600; 310; 30; 20 MG/100ML; MG/100ML; MG/100ML; MG/100ML
100 INJECTION, SOLUTION INTRAVENOUS CONTINUOUS
Status: DISCONTINUED | OUTPATIENT
Start: 2018-11-14 | End: 2018-11-14 | Stop reason: HOSPADM

## 2018-11-14 RX ORDER — SODIUM CHLORIDE 0.9 % (FLUSH) 0.9 %
5-10 SYRINGE (ML) INJECTION AS NEEDED
Status: DISCONTINUED | OUTPATIENT
Start: 2018-11-14 | End: 2018-11-15 | Stop reason: HOSPADM

## 2018-11-14 RX ORDER — ENOXAPARIN SODIUM 100 MG/ML
40 INJECTION SUBCUTANEOUS EVERY 24 HOURS
Status: DISCONTINUED | OUTPATIENT
Start: 2018-11-14 | End: 2018-11-14 | Stop reason: HOSPADM

## 2018-11-14 RX ORDER — LISINOPRIL AND HYDROCHLOROTHIAZIDE 12.5; 2 MG/1; MG/1
1 TABLET ORAL DAILY
Status: CANCELLED | OUTPATIENT
Start: 2018-11-15

## 2018-11-14 RX ORDER — SODIUM CHLORIDE 0.9 % (FLUSH) 0.9 %
5-10 SYRINGE (ML) INJECTION EVERY 8 HOURS
Status: DISCONTINUED | OUTPATIENT
Start: 2018-11-14 | End: 2018-11-14 | Stop reason: HOSPADM

## 2018-11-14 RX ORDER — SODIUM CHLORIDE, SODIUM LACTATE, POTASSIUM CHLORIDE, CALCIUM CHLORIDE 600; 310; 30; 20 MG/100ML; MG/100ML; MG/100ML; MG/100ML
125 INJECTION, SOLUTION INTRAVENOUS CONTINUOUS
Status: DISCONTINUED | OUTPATIENT
Start: 2018-11-14 | End: 2018-11-15 | Stop reason: HOSPADM

## 2018-11-14 RX ORDER — LABETALOL 100 MG/1
100 TABLET, FILM COATED ORAL 2 TIMES DAILY
Status: CANCELLED | OUTPATIENT
Start: 2018-11-14

## 2018-11-14 RX ORDER — MORPHINE SULFATE 2 MG/ML
2 INJECTION, SOLUTION INTRAMUSCULAR; INTRAVENOUS
Status: DISCONTINUED | OUTPATIENT
Start: 2018-11-14 | End: 2018-11-15 | Stop reason: HOSPADM

## 2018-11-14 RX ORDER — NALOXONE HYDROCHLORIDE 0.4 MG/ML
0.4 INJECTION, SOLUTION INTRAMUSCULAR; INTRAVENOUS; SUBCUTANEOUS AS NEEDED
Status: DISCONTINUED | OUTPATIENT
Start: 2018-11-14 | End: 2018-11-15 | Stop reason: HOSPADM

## 2018-11-14 RX ORDER — SODIUM CHLORIDE 0.9 % (FLUSH) 0.9 %
5-10 SYRINGE (ML) INJECTION EVERY 8 HOURS
Status: DISCONTINUED | OUTPATIENT
Start: 2018-11-14 | End: 2018-11-15 | Stop reason: HOSPADM

## 2018-11-14 RX ORDER — OXYBUTYNIN CHLORIDE 5 MG/1
5 TABLET ORAL
Status: DISCONTINUED | OUTPATIENT
Start: 2018-11-14 | End: 2018-11-15 | Stop reason: HOSPADM

## 2018-11-14 RX ORDER — SODIUM CHLORIDE 9 MG/ML
100 INJECTION, SOLUTION INTRAVENOUS ONCE
Status: COMPLETED | OUTPATIENT
Start: 2018-11-14 | End: 2018-11-14

## 2018-11-14 RX ORDER — ADHESIVE BANDAGE
30 BANDAGE TOPICAL
Status: COMPLETED | OUTPATIENT
Start: 2018-11-14 | End: 2018-11-14

## 2018-11-14 RX ORDER — MONTELUKAST SODIUM 10 MG/1
10 TABLET ORAL
Status: CANCELLED | OUTPATIENT
Start: 2018-11-14

## 2018-11-14 RX ORDER — MORPHINE SULFATE 4 MG/ML
4 INJECTION INTRAVENOUS
Status: COMPLETED | OUTPATIENT
Start: 2018-11-14 | End: 2018-11-14

## 2018-11-14 RX ORDER — MORPHINE SULFATE 4 MG/ML
2 INJECTION INTRAVENOUS
Status: DISCONTINUED | OUTPATIENT
Start: 2018-11-14 | End: 2018-11-14 | Stop reason: HOSPADM

## 2018-11-14 RX ORDER — HYDROCHLOROTHIAZIDE 12.5 MG/1
12.5 CAPSULE ORAL DAILY
Status: DISCONTINUED | OUTPATIENT
Start: 2018-11-15 | End: 2018-11-15 | Stop reason: HOSPADM

## 2018-11-14 RX ORDER — DOCUSATE SODIUM 100 MG/1
100 CAPSULE, LIQUID FILLED ORAL 2 TIMES DAILY
Status: DISCONTINUED | OUTPATIENT
Start: 2018-11-14 | End: 2018-11-14 | Stop reason: HOSPADM

## 2018-11-14 RX ORDER — FAMOTIDINE 10 MG/ML
20 INJECTION INTRAVENOUS
Status: COMPLETED | OUTPATIENT
Start: 2018-11-14 | End: 2018-11-14

## 2018-11-14 RX ORDER — ALPRAZOLAM 0.25 MG/1
0.25 TABLET ORAL
Status: CANCELLED | OUTPATIENT
Start: 2018-11-14

## 2018-11-14 RX ORDER — PANTOPRAZOLE SODIUM 40 MG/1
40 TABLET, DELAYED RELEASE ORAL
Status: CANCELLED | OUTPATIENT
Start: 2018-11-15

## 2018-11-14 RX ORDER — PANTOPRAZOLE SODIUM 40 MG/1
40 TABLET, DELAYED RELEASE ORAL
Status: DISCONTINUED | OUTPATIENT
Start: 2018-11-15 | End: 2018-11-15 | Stop reason: HOSPADM

## 2018-11-14 RX ORDER — ONDANSETRON 2 MG/ML
4 INJECTION INTRAMUSCULAR; INTRAVENOUS
Status: DISCONTINUED | OUTPATIENT
Start: 2018-11-14 | End: 2018-11-14 | Stop reason: HOSPADM

## 2018-11-14 RX ORDER — HYDROCODONE BITARTRATE AND ACETAMINOPHEN 5; 325 MG/1; MG/1
1 TABLET ORAL
Status: DISCONTINUED | OUTPATIENT
Start: 2018-11-14 | End: 2018-11-14 | Stop reason: HOSPADM

## 2018-11-14 RX ORDER — WARFARIN SODIUM 5 MG/1
10 TABLET ORAL EVERY EVENING
Status: DISCONTINUED | OUTPATIENT
Start: 2018-11-14 | End: 2018-11-15 | Stop reason: HOSPADM

## 2018-11-14 RX ORDER — DOCUSATE SODIUM 100 MG/1
100 CAPSULE, LIQUID FILLED ORAL 2 TIMES DAILY
Status: DISCONTINUED | OUTPATIENT
Start: 2018-11-14 | End: 2018-11-15 | Stop reason: HOSPADM

## 2018-11-14 RX ADMIN — MORPHINE SULFATE 4 MG: 4 INJECTION INTRAVENOUS at 16:21

## 2018-11-14 RX ADMIN — SODIUM CHLORIDE 100 ML/HR: 900 INJECTION, SOLUTION INTRAVENOUS at 12:43

## 2018-11-14 RX ADMIN — DOCUSATE SODIUM 100 MG: 100 CAPSULE, LIQUID FILLED ORAL at 18:36

## 2018-11-14 RX ADMIN — SODIUM CHLORIDE, SODIUM LACTATE, POTASSIUM CHLORIDE, AND CALCIUM CHLORIDE 125 ML/HR: 600; 310; 30; 20 INJECTION, SOLUTION INTRAVENOUS at 18:39

## 2018-11-14 RX ADMIN — ONDANSETRON 4 MG: 2 INJECTION INTRAMUSCULAR; INTRAVENOUS at 16:21

## 2018-11-14 RX ADMIN — MONTELUKAST SODIUM 10 MG: 10 TABLET, COATED ORAL at 22:41

## 2018-11-14 RX ADMIN — Medication 10 ML: at 22:41

## 2018-11-14 RX ADMIN — FAMOTIDINE 20 MG: 10 INJECTION, SOLUTION INTRAVENOUS at 16:25

## 2018-11-14 RX ADMIN — ENOXAPARIN SODIUM 40 MG: 40 INJECTION, SOLUTION INTRAVENOUS; SUBCUTANEOUS at 22:41

## 2018-11-14 RX ADMIN — LABETALOL HYDROCHLORIDE 100 MG: 100 TABLET, FILM COATED ORAL at 18:36

## 2018-11-14 RX ADMIN — MAGNESIUM HYDROXIDE 30 ML: 400 SUSPENSION ORAL at 18:36

## 2018-11-14 RX ADMIN — ONDANSETRON 4 MG: 2 INJECTION INTRAMUSCULAR; INTRAVENOUS at 13:02

## 2018-11-14 RX ADMIN — WARFARIN SODIUM 10 MG: 5 TABLET ORAL at 22:41

## 2018-11-14 RX ADMIN — IOPAMIDOL 100 ML: 612 INJECTION, SOLUTION INTRAVENOUS at 12:15

## 2018-11-14 NOTE — H&P
Impression:     1 Postop Ileus s/p DVP with BPLND 11/12/18 for Clinical [id number] Sierra 3+4 Adenocarcinoma of the prostate. Plan:  1. Sips of clears  2. Ambulate and MOM  3. Lovenox and Coumadin restarted for h/o DVT. 4. Advance diet as ileus resolves        HPI:   Johny Boxer is 70 y.o. yo  male who presents today postoperatively  Form  DVP w/ BPLND on 11/12/2018 for newly diagnosed prostate cancer. Pt discharge 11/13. Pt with some Nausea and vomited x 1 today. No BM yet, minimal flatus,  Went to [facility] ER and evaluated. CT c/w ileus.   ER felt needed admission    PMH:       Past Medical History:   Diagnosis Date    Arthritis      Bleeding      Blood clot in the legs      Chronic pain       knee and shoulder    Esophageal reflux      GERD (gastroesophageal reflux disease)      Headache(784.0)       migraine    High cholesterol      Hypertension      Lower back pain 11/6/2010    Other chest pain      Personal history of venous thrombosis and embolism      Pure hypercholesterolemia      Right buttock pain 11/6/2010    Right foot pain      Rotator cuff tear       left-since 2010, worsened tear Young.    Spinal stenosis      Tendonitis, tibialis       anterior    Thromboembolus (HCC)       3 after sx last one 2000    Total knee replacement status, left                 Past Surgical History:   Procedure Laterality Date    FOOT/TOES SURGERY PROC UNLISTED        HX BACK SURGERY        HX KNEE REPLACEMENT Left     Ad  ORTHOPAEDIC   06-25-12     Right foot with excision of bursa and adipose tissue from fifth metatarsal base by Dr. Viktoria Mosqueda         lower back (1992 and 2000)    HX OTHER SURGICAL         left foot (2008)    HX OTHER SURGICAL         Retina repair     LAMINOTOMY        NERVE BLOCK          Current Meds:         Current Outpatient Prescriptions   Medication Sig Dispense Refill    raNITIdine (ZANTAC) 150 mg tablet TK 1 T PO HS   1    tamsulosin (FLOMAX) 0.4 mg capsule TAKE 1 CAPSULE BY MOUTH DAILY 30 Cap 0    labetalol (NORMODYNE) 100 mg tablet TAKE ONE TABLET BY MOUTH TWICE DAILY 180 Tab 0    ALPRAZolam (XANAX) 0.5 mg tablet Take one half(1/2) tab to one(1) tab by mouth at bedtime as needed for sleep 30 Tab 0    lansoprazole (PREVACID) 30 mg capsule TAKE 1 CAPSULE DAILY BEFOREBREAKFAST 90 Cap 3    warfarin (COUMADIN) 5 mg tablet TAKE 2 AND 1/2 TABLETS BY MOUTH DAILY OR AS DIRECTED 75 Tab 0    celecoxib (CELEBREX) 200 mg capsule Take 1 Cap by mouth two (2) times a day. (Patient taking differently: Take 200 mg by mouth daily. ) 180 Cap 0    butalbital-acetaminophen-caff (FIORICET) -40 mg per capsule Take 2 capsules every 8 hours as needed for headache 126 Cap 1    lisinopril-hydroCHLOROthiazide (PRINZIDE, ZESTORETIC) 20-12.5 mg per tablet TAKE 1 TABLET BY MOUTH DAILY 90 Tab 0    diclofenac (VOLTAREN) 1 % gel Apply 4 g to affected area four (4) times daily. Maximum 16 grams per joint per day. Dispense 5 100 gram tubes 5 Each 0    warfarin (COUMADIN) 3 mg tablet Or as directed 30 Tab 3    metaxalone (SKELAXIN) 800 mg tablet Take 1 Tab by mouth three (3) times daily. Indications: Muscle Spasm (Patient taking differently: Take 800 mg by mouth three (3) times daily as needed. Indications: Muscle Spasm) 90 Tab 2    montelukast (SINGULAIR) 10 mg tablet TAKE 1 TABLET BY MOUTH EVERY DAY (Patient taking differently: TAKE 1 TABLET BY MOUTH EVERY HS) 90 Tab 0    acetaminophen (TYLENOL) 500 mg tablet Take 1,000 mg by mouth every six (6) hours as needed for Pain.          Allergies:        Allergies   Allergen Reactions    Amoxicillin Itching    Augmentin [Amoxicillin-Pot Clavulanate] Itching    Hibiclens [Chlorhexidine Gluconate] Itching    Penicillins Rash    Requip [Ropinirole] Nausea and Vomiting                  SH:  Social History            Socioeconomic History    Marital status:        Spouse name: Not on file    Number of children: Not on file    Years of education: Not on file    Highest education level: Not on file   Social Needs    Financial resource strain: Not on file    Food insecurity - worry: Not on file    Food insecurity - inability: Not on file    Transportation needs - medical: Not on file   [employer] needs - non-medical: Not on file   Occupational History    Not on file   Tobacco Use    Smoking status: Never Smoker    Smokeless tobacco: Never Used   Substance and Sexual Activity    Alcohol use: No    Drug use: No    Sexual activity: Not Currently   Other Topics Concern    Not on file   Social History Narrative    Not on file         FH:        Family History   Problem Relation Age of Onset   Thom Arthritis-rheumatoid Mother      Dementia Mother      Heart Disease Father      Cancer Father      Hypertension Other      Cancer Brother           Review of Systems  Constitutional: Fever: No  Skin: Rash: No  HEENT: Hearing difficulty: No  Eyes: Blurred vision: No  Cardiovascular: Chest pain: No  Respiratory: Shortness of breath: No  Gastrointestinal: Nausea/vomiting: No  Musculoskeletal: Back pain: No  Neurological: Weakness: No  Psychological: Memory loss: No  Comments/additional findings:         Physical Exam:  Visit Vitals  Visit Vitals  BP (!) 170/93 (BP 1 Location: Left arm, BP Patient Position: At rest)   Pulse 98   Temp 98.9 °F (37.2 °C)   Resp 18   SpO2 91%        Constitutional: WDWN, Pleasant and appropriate affect, No acute distress. CV:  No peripheral swelling noted, RRR  Respiratory: No respiratory distress or difficulties, CTAB  Abdomen:  Mildly distended, mild tenderness as expected  Incisions look fine   Male:  nl ext genitalia, nuñez with vivian urine  Skin: No jaundice. Neuro/Psych:  Alert and oriented x 3. Affect appropriate.    Lymphatic:   No enlarged inguinal lymph nodes.      Lab Results   Component Value Date/Time    WBC 11.4 11/14/2018 11:50 AM    HGB 13.5 11/14/2018 11:50 AM    HCT 40.3 11/14/2018 11:50 AM    PLATELET 020 46/05/4692 11:50 AM    MCV 96.0 11/14/2018 11:50 AM     Lab Results   Component Value Date/Time    Sodium 135 (L) 11/14/2018 11:50 AM    Potassium 3.6 11/14/2018 11:50 AM    Chloride 99 (L) 11/14/2018 11:50 AM    CO2 28 11/14/2018 11:50 AM    Anion gap 8 11/14/2018 11:50 AM    Glucose 125 (H) 11/14/2018 11:50 AM    BUN 11 11/14/2018 11:50 AM    Creatinine 0.90 11/14/2018 11:50 AM    BUN/Creatinine ratio 12 11/14/2018 11:50 AM    GFR est AA >60 11/14/2018 11:50 AM    GFR est non-AA >60 11/14/2018 11:50 AM    Calcium 8.9 11/14/2018 11:50 AM      A/P CT Reviewed:     1. Moderate ascending colonic stool burden without significant stool burden  otherwise, though there is a moderate burden of colonic gas. No findings to  suggest obstruction or ileus. 2.  New Mildly loculated regions of fluid bilateral pelvis may represent seroma,  resolving hematoma versus lymphoceles associated with lymph node dissection. 3.  Unchanged left upper pole renal cyst with peripheral mildly thickened  calcification. 4.  Postsurgical changes abdominal/pelvic wall. 5.  Unchanged sclerotic foci.                 Lab Results   Component Value Date/Time     Prostate Specific Ag 5.3 (H) 05/24/2018 04:52 PM     Prostate Specific Ag 3.0 01/18/2017 02:19 PM            Lab Results   Component Value Date/Time     Prostate Specific Ag 5.3 (H) 05/24/2018 04:52 PM     Prostate Specific Ag 3.0 01/18/2017 02:19 PM         Ej Wu MD   Urologic Oncologist  [facility]  [phone]     CC: Sincere Steinberg MD     Medical documentation provided with the assistance of Sandy Sabillon medical scribe to Davonna Halsted, MD on 11/7/2018.

## 2018-11-14 NOTE — ROUTINE PROCESS
TRANSFER - OUT REPORT:    Verbal report given to Vic Mendez RN(name) on [facility]  being transferred to [address] [place] [address] (unit) for routine progression of care       Report consisted of patients Situation, Background, Assessment and   Recommendations(SBAR). Information from the following report(s) SBAR, ED Summary, Intake/Output and Recent Results was reviewed with the receiving nurse. Lines:       Opportunity for questions and clarification was provided. Patient transported with:    IVL

## 2018-11-14 NOTE — PROGRESS NOTES
TRANSFER - IN REPORT:    Verbal report received from [facility] on [employer]  being received from [facility] ED for routine progression of care      Report consisted of patients Situation, Background, Assessment and   Recommendations(SBAR). Information from the following report(s) ED Summary was reviewed with the receiving nurse. Opportunity for questions and clarification was provided.

## 2018-11-14 NOTE — ED TRIAGE NOTES
Had prostate removed 11/12 (Dr. Sophia Sullivan) and discharged yesterday. Has been having pain to surgical site with NV and constipation since.  Called office today and recommended coming to ED

## 2018-11-14 NOTE — PROGRESS NOTES
1730 received pt via stretcher direct from [facility]. Pain under control, no n/v. Pt given Zofran and Morphine prior to transfer here. Lap sites c/d/i. With nuñez draining to clear yellowish greenish uo. IV site on heplock, patent no sign of infiltration. [address] Velasquez Given to pt room. Per MD, will await for orders. 1830 Pt nauseated, no emesis. Abdomen tender, described pain as intermittent pain in the lower abdomen. Has not been out of bed, encouraged to ambulate in the hallway. Nuñez draining to yellowish greenish uo. IV site no sign of infiltration.

## 2018-11-15 VITALS
OXYGEN SATURATION: 93 % | TEMPERATURE: 98.5 F | RESPIRATION RATE: 18 BRPM | SYSTOLIC BLOOD PRESSURE: 122 MMHG | DIASTOLIC BLOOD PRESSURE: 72 MMHG | HEART RATE: 76 BPM

## 2018-11-15 LAB
ANION GAP SERPL CALC-SCNC: 10 MMOL/L (ref 3–18)
BUN SERPL-MCNC: 12 MG/DL (ref 7–18)
BUN/CREAT SERPL: 16 (ref 12–20)
CALCIUM SERPL-MCNC: 8.4 MG/DL (ref 8.5–10.1)
CHLORIDE SERPL-SCNC: 102 MMOL/L (ref 100–108)
CO2 SERPL-SCNC: 27 MMOL/L (ref 21–32)
CREAT SERPL-MCNC: 0.75 MG/DL (ref 0.6–1.3)
ERYTHROCYTE [DISTWIDTH] IN BLOOD BY AUTOMATED COUNT: 13 % (ref 11.6–14.5)
GLUCOSE SERPL-MCNC: 103 MG/DL (ref 74–99)
HCT VFR BLD AUTO: 39 % (ref 36–48)
HGB BLD-MCNC: 13.2 G/DL (ref 13–16)
INR PPP: 1.2 (ref 0.8–1.2)
MCH RBC QN AUTO: 32.8 PG (ref 24–34)
MCHC RBC AUTO-ENTMCNC: 33.8 G/DL (ref 31–37)
MCV RBC AUTO: 96.8 FL (ref 74–97)
PLATELET # BLD AUTO: 195 K/UL (ref 135–420)
PMV BLD AUTO: 11.3 FL (ref 9.2–11.8)
POTASSIUM SERPL-SCNC: 3.4 MMOL/L (ref 3.5–5.5)
PROTHROMBIN TIME: 14.8 SEC (ref 11.5–15.2)
RBC # BLD AUTO: 4.03 M/UL (ref 4.7–5.5)
SODIUM SERPL-SCNC: 139 MMOL/L (ref 136–145)
WBC # BLD AUTO: 11.4 K/UL (ref 4.6–13.2)

## 2018-11-15 PROCEDURE — 80048 BASIC METABOLIC PNL TOTAL CA: CPT

## 2018-11-15 PROCEDURE — 36415 COLL VENOUS BLD VENIPUNCTURE: CPT

## 2018-11-15 PROCEDURE — 85027 COMPLETE CBC AUTOMATED: CPT

## 2018-11-15 PROCEDURE — 74011250637 HC RX REV CODE- 250/637: Performed by: UROLOGY

## 2018-11-15 PROCEDURE — 99218 HC RM OBSERVATION: CPT

## 2018-11-15 PROCEDURE — 85610 PROTHROMBIN TIME: CPT

## 2018-11-15 PROCEDURE — 96361 HYDRATE IV INFUSION ADD-ON: CPT

## 2018-11-15 PROCEDURE — 74011250636 HC RX REV CODE- 250/636: Performed by: UROLOGY

## 2018-11-15 RX ADMIN — Medication 10 ML: at 16:13

## 2018-11-15 RX ADMIN — WARFARIN SODIUM 10 MG: 5 TABLET ORAL at 17:00

## 2018-11-15 RX ADMIN — HYDROCHLOROTHIAZIDE 12.5 MG: 12.5 CAPSULE ORAL at 08:56

## 2018-11-15 RX ADMIN — HYDROCODONE BITARTRATE AND ACETAMINOPHEN 1 TABLET: 5; 325 TABLET ORAL at 00:54

## 2018-11-15 RX ADMIN — SODIUM CHLORIDE, SODIUM LACTATE, POTASSIUM CHLORIDE, AND CALCIUM CHLORIDE 125 ML/HR: 600; 310; 30; 20 INJECTION, SOLUTION INTRAVENOUS at 10:17

## 2018-11-15 RX ADMIN — SODIUM CHLORIDE, SODIUM LACTATE, POTASSIUM CHLORIDE, AND CALCIUM CHLORIDE 125 ML/HR: 600; 310; 30; 20 INJECTION, SOLUTION INTRAVENOUS at 01:55

## 2018-11-15 RX ADMIN — LABETALOL HYDROCHLORIDE 100 MG: 100 TABLET, FILM COATED ORAL at 08:56

## 2018-11-15 RX ADMIN — LISINOPRIL 20 MG: 20 TABLET ORAL at 08:56

## 2018-11-15 RX ADMIN — PANTOPRAZOLE SODIUM 40 MG: 40 TABLET, DELAYED RELEASE ORAL at 06:53

## 2018-11-15 RX ADMIN — LABETALOL HYDROCHLORIDE 100 MG: 100 TABLET, FILM COATED ORAL at 17:00

## 2018-11-15 RX ADMIN — HYDROCODONE BITARTRATE AND ACETAMINOPHEN 1 TABLET: 5; 325 TABLET ORAL at 10:20

## 2018-11-15 RX ADMIN — ALPRAZOLAM 0.25 MG: 0.5 TABLET ORAL at 01:41

## 2018-11-15 NOTE — ROUTINE PROCESS
Bedside and Verbal shift change report given to Jaquan Armando RN (oncoming nurse) by Carlo Zaldivar RN   (offgoing nurse). Report included the following information SBAR, Intake/Output, MAR, Recent Results and Med Rec Status.

## 2018-11-15 NOTE — DISCHARGE INSTRUCTIONS
DISCHARGE SUMMARY from Nurse    PATIENT INSTRUCTIONS:    After general anesthesia or intravenous sedation, for 24 hours or while taking prescription Narcotics:  · Limit your activities  · Do not drive and operate hazardous machinery  · Do not make important personal or business decisions  · Do  not drink alcoholic beverages  · If you have not urinated within 8 hours after discharge, please contact your surgeon on call. Report the following to your surgeon:  · Excessive pain, swelling, redness or odor of or around the surgical area  · Temperature over 100.5  · Nausea and vomiting lasting longer than 4 hours or if unable to take medications  · Any signs of decreased circulation or nerve impairment to extremity: change in color, persistent  numbness, tingling, coldness or increase pain  · Any questions    What to do at Home:  Recommended activity: No lifting greater than 15lb for 4 weeks, no driving while taking pain medication, may shower, no tub baths or submerging wounds until completely healed. If you experience any of the symptoms above, please follow up with Dr. Myke Pastor. *  Please give a list of your current medications to your Primary Care Provider. *  Please update this list whenever your medications are discontinued, doses are      changed, or new medications (including over-the-counter products) are added. *  Please carry medication information at all times in case of emergency situations. These are general instructions for a healthy lifestyle:    No smoking/ No tobacco products/ Avoid exposure to second hand smoke  Surgeon General's Warning:  Quitting smoking now greatly reduces serious risk to your health.     Obesity, smoking, and sedentary lifestyle greatly increases your risk for illness    A healthy diet, regular physical exercise & weight monitoring are important for maintaining a healthy lifestyle    You may be retaining fluid if you have a history of heart failure or if you experience any of the following symptoms:  Weight gain of 3 pounds or more overnight or 5 pounds in a week, increased swelling in our hands or feet or shortness of breath while lying flat in bed. Please call your doctor as soon as you notice any of these symptoms; do not wait until your next office visit. Recognize signs and symptoms of STROKE:    F-face looks uneven    A-arms unable to move or move unevenly    S-speech slurred or non-existent    T-time-call 911 as soon as signs and symptoms begin-DO NOT go       Back to bed or wait to see if you get better-TIME IS BRAIN. Warning Signs of HEART ATTACK     Call 911 if you have these symptoms:   Chest discomfort. Most heart attacks involve discomfort in the center of the chest that lasts more than a few minutes, or that goes away and comes back. It can feel like uncomfortable pressure, squeezing, fullness, or pain.  Discomfort in other areas of the upper body. Symptoms can include pain or discomfort in one or both arms, the back, neck, jaw, or stomach.  Shortness of breath with or without chest discomfort.  Other signs may include breaking out in a cold sweat, nausea, or lightheadedness. Don't wait more than five minutes to call 911 - MINUTES MATTER! Fast action can save your life. Calling 911 is almost always the fastest way to get lifesaving treatment. Emergency Medical Services staff can begin treatment when they arrive -- up to an hour sooner than if someone gets to the hospital by car. The discharge information has been reviewed with the patient. The patient verbalized understanding. Discharge medications reviewed with the patient and appropriate educational materials and side effects teaching were provided. Patient armband removed and shredded.   ___________________________________________________________________________________________________________________________________

## 2018-11-15 NOTE — PROGRESS NOTES
conducted an initial consultation and spiritual assessment for Ailyn Snowden, who is a 70 y. o.,male. Patients primary language is: [place]. According to the patients EMR, his Oriental orthodox affiliation is: [facility]. He holds to a strong flex. The  provided the following interventions:  Initiated a relationship of care and support. Provided information about Spiritual Care Services. Explored issues of flex, belief, spirituality and Oriental orthodox/ritual needs while hospitalized. Listened empathically. Patient shared some of the details of struggles he has faced during 2018, most of them his health issues, and the most recent, the sudden death of his 80year old mother within the last week or so. Provided patient with a daily devotional.  Offered prayer with patient and assurance of continued prayers on his behalf. Chart reviewed. The following outcomes were achieved:  Patient shared limited information about his medical situation and spiritual journey/beliefs. Patient expressed feelings about current hospitalization. This is his first time in our facility, and he has been impressed with the care and spiritual influence. Patient expressed gratitude for the 's visit and prayer. Assessment:  Patient did not indicate any Oriental orthodox/cultural needs that will affect his preferences in health care. Patient did not indicate any spiritual or Oriental orthodox issues which require further Spiritual Care Services interventions at this time. Plan:  Chaplains will continue to follow and will provide pastoral care on an as-needed/requested basis.     Formerly [facility]  Board Certified [facility] [address]    [phone]

## 2018-11-15 NOTE — PROGRESS NOTES
1330 pt walked 200ft in hallway, tolerated well    1345 pt with 4 watery BM, pt had colace yesterday so does not qualify for cdiff testing. 1630 spoke with Dr Chinchilla, he requested pt be given regular diet for dinner, if tolerates, he can be discharged. 1830 pt discharged.

## 2018-11-15 NOTE — ROUTINE PROCESS
Pt in bed assisted with ambulation in hallway, tolerated well. Pt made comfortable in bed, call bell in pt reach  0055 Pt medicated for pain in abdomen level 6, with norco 1 tab po as ordered  0145 Pt in bed passed small amount brown liquid stool, skin care given  0200 Pt assisted to bathroom, pt passed large amount liquid brown stool and flatus on toilet.   0220 Pt made comfortable in bed call bell in pt reach  0250 Pt in bed sleeping no distress noted

## 2018-11-15 NOTE — PROGRESS NOTES
Care Management Interventions  PCP Verified by CM: Yes  Palliative Care Criteria Met (RRAT>21 & CHF Dx)?: No  Transition of Care Consult (CM Consult): Discharge Planning  Current Support Network: Lives with Spouse  Confirm Follow Up Transport: Family  Plan discussed with Pt/Family/Caregiver: Yes  Discharge Location  Discharge Placement: Home     Reason for Admission:   SBO                   RRAT Score:  green                   Plan for utilizing home health: If recommended                      Likelihood of Readmission:  green                         Transition of Care Plan:     Spoke with patient in room, He is independent with ADL's and has walker and cane at home. He verified his address, PCP, insurance and phone # as correct on the facesheet. Patient has designated __spouse______________________ to participate in his/her discharge plan and to receive any needed information. Nima Gusman  [phone] He plans to return home upon discharge and transportation will be provided by family. Patient and/or next of kin has been given and has signed the [facility] Outpatient Observation  Notification letter and all questions answered. Copy of this notice given to patient and copy placed on chart.     Patient and/or next of kin has been given the Outpatient Observation Information and Notification letter and all questions answered.

## 2018-11-15 NOTE — PROGRESS NOTES
Problem: Falls - Risk of  Goal: *Absence of Falls  Document Jj Fall Risk and appropriate interventions in the flowsheet.   Outcome: Progressing Towards Goal  Fall Risk Interventions:  Mobility Interventions: Patient to call before getting OOB         Medication Interventions: Patient to call before getting OOB, Teach patient to arise slowly    Elimination Interventions: Call light in reach

## 2018-11-23 ENCOUNTER — TELEPHONE (OUTPATIENT)
Dept: INTERNAL MEDICINE CLINIC | Age: 63
End: 2018-11-23

## 2018-11-23 ENCOUNTER — OFFICE VISIT (OUTPATIENT)
Dept: INTERNAL MEDICINE CLINIC | Age: 63
End: 2018-11-23

## 2018-11-23 VITALS
TEMPERATURE: 99.4 F | HEART RATE: 77 BPM | HEIGHT: 70 IN | DIASTOLIC BLOOD PRESSURE: 72 MMHG | BODY MASS INDEX: 31.78 KG/M2 | OXYGEN SATURATION: 95 % | WEIGHT: 222 LBS | SYSTOLIC BLOOD PRESSURE: 132 MMHG

## 2018-11-23 DIAGNOSIS — Z79.01 WARFARIN ANTICOAGULATION: Primary | ICD-10-CM

## 2018-11-23 DIAGNOSIS — K12.1 STOMATITIS: ICD-10-CM

## 2018-11-23 DIAGNOSIS — R05.9 COUGH: ICD-10-CM

## 2018-11-23 RX ORDER — MONTELUKAST SODIUM 10 MG/1
TABLET ORAL
Qty: 90 TAB | Refills: 0 | Status: SHIPPED | OUTPATIENT
Start: 2018-11-23 | End: 2018-11-24 | Stop reason: ALTCHOICE

## 2018-11-23 RX ORDER — AZITHROMYCIN 250 MG/1
TABLET, FILM COATED ORAL
Qty: 6 TAB | Refills: 0 | Status: SHIPPED | OUTPATIENT
Start: 2018-11-23 | End: 2018-11-28

## 2018-11-23 RX ORDER — WARFARIN SODIUM 5 MG/1
TABLET ORAL
Qty: 75 TAB | Refills: 0 | Status: SHIPPED | OUTPATIENT
Start: 2018-11-23 | End: 2018-11-24 | Stop reason: ALTCHOICE

## 2018-11-23 NOTE — TELEPHONE ENCOUNTER
Patient called due to problems he is having after having surgery for prostate Cancer Mainly Coughing and his tongue is red and sore  He would like to know if Dr Iqra Celis could send something to the [facility] for it.  Please call and let him know [phone]

## 2018-11-24 NOTE — PROGRESS NOTES
The patient presents to the office today with the chief complaint of cough    HPI    The patient is post op for prostate cancer. He now has 3 days of a dry sore throat. This problem is slowly improving. The patient also complains of a bother some cough. He denies dyspnea. He denies fever. Review of Systems   HENT:        Sore throat   Respiratory: Positive for cough. Negative for shortness of breath. Cardiovascular: Negative for chest pain and leg swelling. Allergies   Allergen Reactions    Amoxicillin Itching    Augmentin [Amoxicillin-Pot Clavulanate] Itching    Hibiclens [Chlorhexidine Gluconate] Itching    Milk Containing Products Diarrhea    Penicillins Rash    Requip [Ropinirole] Nausea and Vomiting       Current Outpatient Medications   Medication Sig Dispense Refill    azithromycin (ZITHROMAX) 250 mg tablet Take 2 tablets today, then take 1 tablet daily 6 Tab 0    celecoxib (CELEBREX) 200 mg capsule TAKE 1 CAPSULE BY MOUTH TWICE DAILY 180 Cap 0    lisinopril-hydroCHLOROthiazide (PRINZIDE, ZESTORETIC) 20-12.5 mg per tablet TAKE 1 TABLET BY MOUTH DAILY 90 Tab 0    diclofenac (VOLTAREN) 1 % gel Apply 4 g to affected area four (4) times daily. Maximum 16 grams per joint per day.  Dispense 5 100 gram tubes 5 Each 0    raNITIdine (ZANTAC) 150 mg tablet TK 1 T PO HS  1    labetalol (NORMODYNE) 100 mg tablet TAKE ONE TABLET BY MOUTH TWICE DAILY 180 Tab 0    ALPRAZolam (XANAX) 0.5 mg tablet Take one half(1/2) tab to one(1) tab by mouth at bedtime as needed for sleep 30 Tab 0    lansoprazole (PREVACID) 30 mg capsule TAKE 1 CAPSULE DAILY BEFOREBREAKFAST 90 Cap 3    warfarin (COUMADIN) 5 mg tablet TAKE 2 AND 1/2 TABLETS BY MOUTH DAILY OR AS DIRECTED 75 Tab 0    butalbital-acetaminophen-caff (FIORICET) -40 mg per capsule Take 2 capsules every 8 hours as needed for headache 126 Cap 1    warfarin (COUMADIN) 3 mg tablet Or as directed 30 Tab 3    metaxalone (SKELAXIN) 800 mg tablet Take 1 Tab by mouth three (3) times daily. Indications: Muscle Spasm (Patient taking differently: Take 800 mg by mouth three (3) times daily as needed. Indications: Muscle Spasm) 90 Tab 2    montelukast (SINGULAIR) 10 mg tablet TAKE 1 TABLET BY MOUTH EVERY DAY (Patient taking differently: TAKE 1 TABLET BY MOUTH EVERY HS) 90 Tab 0    acetaminophen (TYLENOL) 500 mg tablet Take 1,000 mg by mouth every six (6) hours as needed for Pain.          Past Medical History:   Diagnosis Date    Arthritis     Bleeding     Chronic pain     knee and shoulder    GERD (gastroesophageal reflux disease)     Headache(784.0)     migraine    High cholesterol     Hypertension     Lower back pain 11/6/2010    Other chest pain     Pure hypercholesterolemia     Right buttock pain 11/6/2010    Right foot pain     Rotator cuff tear     left-since 2010, worsened tear Jan.    Rotator cuff tear, right     Spinal stenosis     Tendonitis, tibialis     anterior    Thromboembolus ([facility] [address]     3 after sx last one 2000       Past Surgical History:   Procedure Laterality Date    FOOT/TOES SURGERY PROC UNLISTED      HX BACK SURGERY      HX KNEE REPLACEMENT Left     HX ORTHOPAEDIC  06-25-12    Right foot with excision of bursa and adipose tissue from fifth metatarsal base by Dr. Rainer Howe      lower back (1992 and 2000)    HX OTHER SURGICAL      left foot (2008)    HX OTHER SURGICAL      Retina repair     LAMINOTOMY      NERVE BLOCK         Social History     Socioeconomic History    Marital status:      Spouse name: Not on file    Number of children: Not on file    Years of education: Not on file    Highest education level: Not on file   Social Needs    Financial resource strain: Not on file    Food insecurity - worry: Not on file    Food insecurity - inability: Not on file   U*tique needs - medical: Not on file   U*t[employer] needs - non-medical: Not on file   Occupational History    Not on file   Tobacco Use    Smoking status: Never Smoker    Smokeless tobacco: Never Used   Substance and Sexual Activity    Alcohol use: No    Drug use: No    Sexual activity: Not Currently   Other Topics Concern    Not on file   Social History Narrative    Not on file       Patient does not have an advanced directive on file    Visit Vitals  /72 (BP 1 Location: Left arm, BP Patient Position: Sitting)   Pulse 77   Temp 99.4 °F (37.4 °C) (Tympanic)   Ht 5' 10\" (1.778 m)   Wt 222 lb (100.7 kg)   SpO2 95%   BMI 31.85 kg/m²       Physical Exam   No Cervical Lymphadenopathy  No Supraclavicular Lymphadenopathy  Thyroid is Normal  Lungs are normal to percussion. Clear to auscultation   Heart:  S1 S2 are normal, No gallops, No murmers  No Carotid Bruits  Abdomen:  Normal Bowel Sounds. No tenderness. No masses. No Hepatomegaly or Splenomegaly  LE:  Strong Pedal Pulses.   No Edema      BMI:  BMI is high but it was not addressed during this visit due to an acute problem      Admission on 11/14/2018, Discharged on 11/15/2018   Component Date Value Ref Range Status    Prothrombin time 11/14/2018 14.0  11.5 - 15.2 sec Final    INR 11/14/2018 1.1  0.8 - 1.2   Final    Comment:            INR Therapeutic Ranges         (on stable oral anticoagulant):     INDICATION                INR  DVT/PE/Atrial Fib          2.0-3.0  MI/Mechanical Heart Valve  2.5-3.5      Prothrombin time 11/15/2018 14.8  11.5 - 15.2 sec Final    INR 11/15/2018 1.2  0.8 - 1.2   Final    Comment:            INR Therapeutic Ranges         (on stable oral anticoagulant):     INDICATION                INR  DVT/PE/Atrial Fib          2.0-3.0  MI/Mechanical Heart Valve  2.5-3.5      WBC 11/15/2018 11.4  4.6 - 13.2 K/uL Final    RBC 11/15/2018 4.03* 4.70 - 5.50 M/uL Final    HGB 11/15/2018 13.2  13.0 - 16.0 g/dL Final    HCT 11/15/2018 39.0  36.0 - 48.0 % Final    MCV 11/15/2018 96.8  74.0 - 97.0 FL Final    MCH 11/15/2018 32.8  24.0 - 34.0 PG Final    MCHC 11/15/2018 33.8  31.0 - 37.0 g/dL Final    RDW 11/15/2018 13.0  11.6 - 14.5 % Final    PLATELET 48/50/4034 400  135 - 420 K/uL Final    MPV 11/15/2018 11.3  9.2 - 11.8 FL Final    Sodium 11/15/2018 139  136 - 145 mmol/L Final    Potassium 11/15/2018 3.4* 3.5 - 5.5 mmol/L Final    Chloride 11/15/2018 102  100 - 108 mmol/L Final    CO2 11/15/2018 27  21 - 32 mmol/L Final    Anion gap 11/15/2018 10  3.0 - 18 mmol/L Final    Glucose 11/15/2018 103* 74 - 99 mg/dL Final    BUN 11/15/2018 12  7.0 - 18 MG/DL Final    Creatinine 11/15/2018 0.75  0.6 - 1.3 MG/DL Final    BUN/Creatinine ratio 11/15/2018 16  12 - 20   Final    GFR est AA 11/15/2018 >60  >60 ml/min/1.73m2 Final    GFR est non-AA 11/15/2018 >60  >60 ml/min/1.73m2 Final    Comment: (NOTE)  Estimated GFR is calculated using the Modification of Diet in Renal   Disease (MDRD) Study equation, reported for both  Americans   (GFRAA) and non- Americans (GFRNA), and normalized to 1.73m2   body surface area. The physician must decide which value applies to   the patient. The MDRD study equation should only be used in   individuals age 25 or older. It has not been validated for the   following: pregnant women, patients with serious comorbid conditions,   or on certain medications, or persons with extremes of body size,   muscle mass, or nutritional status.       Calcium 11/15/2018 8.4* 8.5 - 10.1 MG/DL Final   Admission on 11/14/2018, Discharged on 11/14/2018   Component Date Value Ref Range Status    WBC 11/14/2018 11.4  4.6 - 13.2 K/uL Final    RBC 11/14/2018 4.20* 4.70 - 5.50 M/uL Final    HGB 11/14/2018 13.5  13.0 - 16.0 g/dL Final    HCT 11/14/2018 40.3  36.0 - 48.0 % Final    MCV 11/14/2018 96.0  74.0 - 97.0 FL Final    MCH 11/14/2018 32.1  24.0 - 34.0 PG Final    MCHC 11/14/2018 33.5  31.0 - 37.0 g/dL Final    RDW 11/14/2018 12.8  11.6 - 14.5 % Final    PLATELET 52/52/1680 208  135 - 420 K/uL Final    MPV 11/14/2018 10.5  9.2 - 11.8 FL Final    NEUTROPHILS 11/14/2018 81* 40 - 73 % Final    LYMPHOCYTES 11/14/2018 8* 21 - 52 % Final    MONOCYTES 11/14/2018 11* 3 - 10 % Final    EOSINOPHILS 11/14/2018 0  0 - 5 % Final    BASOPHILS 11/14/2018 0  0 - 2 % Final    ABS. NEUTROPHILS 11/14/2018 9.2* 1.8 - 8.0 K/UL Final    ABS. LYMPHOCYTES 11/14/2018 0.9  0.9 - 3.6 K/UL Final    ABS. MONOCYTES 11/14/2018 1.3* 0.05 - 1.2 K/UL Final    ABS. EOSINOPHILS 11/14/2018 0.0  0.0 - 0.4 K/UL Final    ABS. BASOPHILS 11/14/2018 0.0  0.0 - 0.1 K/UL Final    DF 11/14/2018 AUTOMATED    Final    Sodium 11/14/2018 135* 136 - 145 mmol/L Final    Potassium 11/14/2018 3.6  3.5 - 5.5 mmol/L Final    Chloride 11/14/2018 99* 100 - 108 mmol/L Final    CO2 11/14/2018 28  21 - 32 mmol/L Final    Anion gap 11/14/2018 8  3.0 - 18 mmol/L Final    Glucose 11/14/2018 125* 74 - 99 mg/dL Final    BUN 11/14/2018 11  7.0 - 18 MG/DL Final    Creatinine 11/14/2018 0.90  0.6 - 1.3 MG/DL Final    BUN/Creatinine ratio 11/14/2018 12  12 - 20   Final    GFR est AA 11/14/2018 >60  >60 ml/min/1.73m2 Final    GFR est non-AA 11/14/2018 >60  >60 ml/min/1.73m2 Final    Comment: (NOTE)  Estimated GFR is calculated using the Modification of Diet in Renal   Disease (MDRD) Study equation, reported for both  Americans   (GFRAA) and non- Americans (GFRNA), and normalized to 1.73m2   body surface area. The physician must decide which value applies to   the patient. The MDRD study equation should only be used in   individuals age 25 or older. It has not been validated for the   following: pregnant women, patients with serious comorbid conditions,   or on certain medications, or persons with extremes of body size,   muscle mass, or nutritional status.       Calcium 11/14/2018 8.9  8.5 - 10.1 MG/DL Final    Bilirubin, total 11/14/2018 0.6  0.2 - 1.0 MG/DL Final    ALT (SGPT) 11/14/2018 26  16 - 61 U/L Final    AST (SGOT) 11/14/2018 20  15 - 37 U/L Final    Alk.  phosphatase 11/14/2018 52  45 - 117 U/L Final    Protein, total 11/14/2018 6.7  6.4 - 8.2 g/dL Final    Albumin 11/14/2018 3.4  3.4 - 5.0 g/dL Final    Globulin 11/14/2018 3.3  2.0 - 4.0 g/dL Final    A-G Ratio 11/14/2018 1.0  0.8 - 1.7   Final    Color 11/14/2018 YELLOW    Final    Appearance 11/14/2018 CLEAR    Final    Specific gravity 11/14/2018 1.019  1.005 - 1.030   Final    pH (UA) 11/14/2018 6.5  5.0 - 8.0   Final    Protein 11/14/2018 NEGATIVE   NEG mg/dL Final    Glucose 11/14/2018 NEGATIVE   NEG mg/dL Final    Ketone 11/14/2018 NEGATIVE   NEG mg/dL Final    Bilirubin 11/14/2018 NEGATIVE   NEG   Final    Blood 11/14/2018 MODERATE* NEG   Final    Urobilinogen 11/14/2018 0.2  0.2 - 1.0 EU/dL Final    Nitrites 11/14/2018 NEGATIVE   NEG   Final    Leukocyte Esterase 11/14/2018 SMALL* NEG   Final    Lactic Acid (POC) 11/14/2018 0.78  0.40 - 2.00 mmol/L Final    WBC 11/14/2018 0 to 3  0 - 4 /hpf Final    RBC 11/14/2018 11 to 20  0 - 5 /hpf Final    Epithelial cells 11/14/2018 FEW  0 - 5 /lpf Final   Admission on 11/12/2018, Discharged on 11/13/2018   Component Date Value Ref Range Status    Crossmatch Expiration 11/12/2018 11/15/2018   Final    ABO/Rh(D) 11/12/2018 B POSITIVE   Final    Antibody screen 11/12/2018 NEG   Final    Prothrombin time 11/12/2018 13.8  11.5 - 15.2 sec Final    INR 11/12/2018 1.1  0.8 - 1.2   Final    Comment:            INR Therapeutic Ranges         (on stable oral anticoagulant):     INDICATION                INR  DVT/PE/Atrial Fib          2.0-3.0  MI/Mechanical Heart Valve  2.5-3.5      Sodium 11/13/2018 140  136 - 145 mmol/L Final    Potassium 11/13/2018 4.1  3.5 - 5.5 mmol/L Final    Chloride 11/13/2018 104  100 - 108 mmol/L Final    CO2 11/13/2018 26  21 - 32 mmol/L Final    Anion gap 11/13/2018 10  3.0 - 18 mmol/L Final    Glucose 11/13/2018 105* 74 - 99 mg/dL Final    BUN 11/13/2018 19* 7.0 - 18 MG/DL Final    Creatinine 11/13/2018 0.94  0.6 - 1.3 MG/DL Final    BUN/Creatinine ratio 11/13/2018 20  12 - 20   Final    GFR est AA 11/13/2018 >60  >60 ml/min/1.73m2 Final    GFR est non-AA 11/13/2018 >60  >60 ml/min/1.73m2 Final    Comment: (NOTE)  Estimated GFR is calculated using the Modification of Diet in Renal   Disease (MDRD) Study equation, reported for both  Americans   (GFRAA) and non- Americans (GFRNA), and normalized to 1.73m2   body surface area. The physician must decide which value applies to   the patient. The MDRD study equation should only be used in   individuals age 25 or older. It has not been validated for the   following: pregnant women, patients with serious comorbid conditions,   or on certain medications, or persons with extremes of body size,   muscle mass, or nutritional status.       Calcium 11/13/2018 8.4* 8.5 - 10.1 MG/DL Final    WBC 11/13/2018 9.0  4.6 - 13.2 K/uL Final    RBC 11/13/2018 3.74* 4.70 - 5.50 M/uL Final    HGB 11/13/2018 11.9* 13.0 - 16.0 g/dL Final    HCT 11/13/2018 36.4  36.0 - 48.0 % Final    MCV 11/13/2018 97.3* 74.0 - 97.0 FL Final    MCH 11/13/2018 31.8  24.0 - 34.0 PG Final    MCHC 11/13/2018 32.7  31.0 - 37.0 g/dL Final    RDW 11/13/2018 13.4  11.6 - 14.5 % Final    PLATELET 91/59/7105 896  135 - 420 K/uL Final    MPV 11/13/2018 11.3  9.2 - 11.8 [facility] Final   Hospital Outpatient Visit on 11/03/2018   Component Date Value Ref Range Status    Ventricular Rate 11/03/2018 76  BPM Final    Atrial Rate 11/03/2018 76  BPM Final    P-R Interval 11/03/2018 180  ms Final    QRS Duration 11/03/2018 94  ms Final    Q-T Interval 11/03/2018 366  ms Final    QTC Calculation (Bezet) 11/03/2018 411  ms Final    Calculated P Axis 11/03/2018 53  degrees Final    Calculated R Axis 11/03/2018 5  degrees Final    Calculated T Axis 11/03/2018 63  degrees Final    Diagnosis 11/03/2018    Final                    Value:Normal sinus rhythm  Possible Left atrial enlargement  Inferior infarct , age undetermined  Abnormal ECG  When compared with ECG of 31-JAN-2018 11:29,  No significant change was found  Confirmed by Pankaj Velarde ([id number]) on 11/4/2018 9:40:19 PM      WBC 11/03/2018 4.7  4.6 - 13.2 K/uL Final    RBC 11/03/2018 4.13* 4.70 - 5.50 M/uL Final    HGB 11/03/2018 13.1  13.0 - 16.0 g/dL Final    HCT 11/03/2018 39.0  36.0 - 48.0 % Final    MCV 11/03/2018 94.4  74.0 - 97.0 FL Final    MCH 11/03/2018 31.7  24.0 - 34.0 PG Final    MCHC 11/03/2018 33.6  31.0 - 37.0 g/dL Final    RDW 11/03/2018 13.3  11.6 - 14.5 % Final    PLATELET 57/05/8753 831  135 - 420 K/uL Final    MPV 11/03/2018 10.8  9.2 - 11.8 FL Final    Sodium 11/03/2018 141  136 - 145 mmol/L Final    Potassium 11/03/2018 3.9  3.5 - 5.5 mmol/L Final    Chloride 11/03/2018 107  100 - 108 mmol/L Final    CO2 11/03/2018 29  21 - 32 mmol/L Final    Anion gap 11/03/2018 5  3.0 - 18 mmol/L Final    Glucose 11/03/2018 115* 74 - 99 mg/dL Final    BUN 11/03/2018 12  7.0 - 18 MG/DL Final    Creatinine 11/03/2018 0.91  0.6 - 1.3 MG/DL Final    BUN/Creatinine ratio 11/03/2018 13  12 - 20   Final    GFR est AA 11/03/2018 >60  >60 ml/min/1.73m2 Final    GFR est non-AA 11/03/2018 >60  >60 ml/min/1.73m2 Final    Comment: (NOTE)  Estimated GFR is calculated using the Modification of Diet in Renal   Disease (MDRD) Study equation, reported for both  Americans   (GFRAA) and non- Americans (GFRNA), and normalized to 1.73m2   body surface area. The physician must decide which value applies to   the patient. The MDRD study equation should only be used in   individuals age 25 or older. It has not been validated for the   following: pregnant women, patients with serious comorbid conditions,   or on certain medications, or persons with extremes of body size,   muscle mass, or nutritional status.       Calcium 11/03/2018 8.4* 8.5 - 10.1 MG/DL Final    Color 11/03/2018 YELLOW    Final    Appearance 11/03/2018 CLEAR    Final    Specific gravity 11/03/2018 1.013  1.005 - 1.030   Final    pH (UA) 11/03/2018 6.0  5.0 - 8.0   Final    Protein 11/03/2018 NEGATIVE   NEG mg/dL Final    Glucose 11/03/2018 NEGATIVE   NEG mg/dL Final    Ketone 11/03/2018 NEGATIVE   NEG mg/dL Final    Bilirubin 11/03/2018 NEGATIVE   NEG   Final    Blood 11/03/2018 NEGATIVE   NEG   Final    Urobilinogen 11/03/2018 0.2  0.2 - 1.0 EU/dL Final    Nitrites 11/03/2018 NEGATIVE   NEG   Final    Leukocyte Esterase 11/03/2018 NEGATIVE   NEG   Final    Special Requests: 11/03/2018 NO SPECIAL REQUESTS    Final    Culture result: 11/03/2018 NO GROWTH 1 DAY    Final   Anti-Coag visit on 10/16/2018   Component Date Value Ref Range Status    VALID INTERNAL CONTROL POC 10/16/2018 Yes   Final    INR POC 10/16/2018 1.6   Final   Office Visit on 10/08/2018   Component Date Value Ref Range Status    Glucose (UA POC) 10/08/2018 1+  Negative Final    Bilirubin (UA POC) 10/08/2018 Negative  Negative Final    Ketones (UA POC) 10/08/2018 Negative  Negative Final    Specific gravity (UA POC) 10/08/2018 1.010  1.001 - 1.035 Final    Blood (UA POC) 10/08/2018 Negative  Negative Final    pH (UA POC) 10/08/2018 6.5  4.6 - 8.0 Final    Protein (UA POC) 10/08/2018 Negative  Negative Final    Urobilinogen (UA POC) 10/08/2018 0.2 mg/dL  0.2 - 1 Final    Nitrites (UA POC) 10/08/2018 Negative  Negative Final    Leukocyte esterase (UA POC) 10/08/2018 Negative  Negative Final   Office Visit on 09/20/2018   Component Date Value Ref Range Status    Color (UA POC) 09/20/2018 Yellow   Final    Clarity (UA POC) 09/20/2018 Clear   Final    Glucose (UA POC) 09/20/2018 Negative  Negative Final    Bilirubin (UA POC) 09/20/2018 Negative  Negative Final    Ketones (UA POC) 09/20/2018 Negative  Negative Final    Specific gravity (UA POC) 09/20/2018 1.015  1.001 - 1.035 Final    Blood (UA POC) 09/20/2018 Trace  Negative Final    pH (UA POC) 09/20/2018 5.5 4.6 - 8.0 Final    Protein (UA POC) 09/20/2018 Negative  Negative Final    Urobilinogen (UA POC) 09/20/2018 0.2 mg/dL  0.2 - 1 Final    Nitrites (UA POC) 09/20/2018 Negative  Negative Final    Leukocyte esterase (UA POC) 09/20/2018 Negative  Negative Final   Office Visit on 08/30/2018   Component Date Value Ref Range Status    Hemoglobin A1c (POC) 08/30/2018 5.9  % Final    Glucose POC 08/30/2018 106  mg/dL Final   Hospital Outpatient Visit on 08/29/2018   Component Date Value Ref Range Status    Creatinine, POC 08/29/2018 1.0  0.6 - 1.3 MG/DL Final    GFRAA, POC 08/29/2018 >60  >60 ml/min/1.73m2 Final    GFRNA, POC 08/29/2018 >60  >60 ml/min/1.73m2 Final    Comment: Estimated GFR is calculated using the IDMS-traceable Modification of Diet in Renal Disease (MDRD) Study equation, reported for both  Americans (GFRAA) and non- Americans (GFRNA), and normalized to 1.73m2 body surface area. The physician must decide which value applies to the patient. The MDRD study equation should only be used in individuals age 25 or older. It has not been validated for the following: pregnant women, patients with serious comorbid conditions, or on certain medications, or persons with extremes of body size, muscle mass, or nutritional status. .No results found for any visits on 11/23/18. Assessment / Plan      ICD-10-CM ICD-9-CM    1. Warfarin anticoagulation Z79.01 V58.61    2. Stomatitis K12.1 528.00    3. Cough R05 786.2        Gargle with warm water  Z Kamran  Hold Coumadin on Sunday and Tuesday due to interaction between the antibiotic and Coumadin  he was advised to continue his maintenance medications  he is to call if symptoms persist over five days      Follow-up Disposition:  Return in about 2 months (around 1/23/2019). I asked Mikaela York if he has any questions and I answered the questions.   Mikaela York states that he understands the treatment plan and agrees with the treatment plan

## 2018-11-27 ENCOUNTER — TELEPHONE (OUTPATIENT)
Dept: INTERNAL MEDICINE CLINIC | Age: 63
End: 2018-11-27

## 2018-11-27 DIAGNOSIS — K14.6 TONGUE PAIN: Primary | ICD-10-CM

## 2018-11-27 NOTE — TELEPHONE ENCOUNTER
Dr David Naranjo told patient to call and Let him know how he was doing he stated the cough is better but the tongue is not at all  He would like some one to call and advise.   [phone]

## 2018-11-30 RX ORDER — NYSTATIN 100000 U/G
CREAM TOPICAL
Qty: 15 G | Refills: 0 | Status: SHIPPED | OUTPATIENT
Start: 2018-11-30 | End: 2019-01-09 | Stop reason: SDUPTHER

## 2018-12-06 ENCOUNTER — TELEPHONE (OUTPATIENT)
Dept: ORTHOPEDIC SURGERY | Age: 63
End: 2018-12-06

## 2018-12-06 NOTE — TELEPHONE ENCOUNTER
The patient's wife requests a prescription for a Medrol Dosepack on the patient's behalf. Please advise.      Last Visit: 08/20/2018 with MD Gladis Taylor   Next Appointment: 01/30/2019 with MD Gladis Taylor; noted to f/u in 4 months; Pt canceled 12/18

## 2018-12-07 RX ORDER — METHYLPREDNISOLONE 4 MG/1
TABLET ORAL
Qty: 1 DOSE PACK | Refills: 0 | Status: SHIPPED | OUTPATIENT
Start: 2018-12-07 | End: 2019-01-16

## 2018-12-07 NOTE — TELEPHONE ENCOUNTER
Returned call to [employer] on HIPAA form, informed of message per Dr. Pierce Saunders. Mrs. Javid Monroe verified patient's pharmacy as [facility] on [facility]. No further action required at this time.

## 2018-12-10 ENCOUNTER — TELEPHONE (OUTPATIENT)
Dept: INTERNAL MEDICINE CLINIC | Age: 63
End: 2018-12-10

## 2018-12-10 NOTE — TELEPHONE ENCOUNTER
Patient called to see if it was okay for him to start taking the Prednisone again. He had surgery 30 days ago and wants to make sure it is okay to start taking it again. He is having some swelling and this helps is why he wants to take it.   Please advise at [phone]

## 2018-12-11 NOTE — TELEPHONE ENCOUNTER
Patient was called and advised per DR Kaitlin Joseph that is is ok to be taking prednisone     Patient verbalized understanding

## 2018-12-13 ENCOUNTER — TELEPHONE (OUTPATIENT)
Dept: INTERNAL MEDICINE CLINIC | Age: 63
End: 2018-12-13

## 2018-12-13 NOTE — TELEPHONE ENCOUNTER
Patient is calling asking if Dr Shawnee Muhammad could increase  The doses in the Methylprednisolone  .  Please call him at [phone]

## 2018-12-14 ENCOUNTER — OFFICE VISIT (OUTPATIENT)
Dept: INTERNAL MEDICINE CLINIC | Age: 63
End: 2018-12-14

## 2018-12-14 ENCOUNTER — TELEPHONE (OUTPATIENT)
Dept: INTERNAL MEDICINE CLINIC | Age: 63
End: 2018-12-14

## 2018-12-14 VITALS
SYSTOLIC BLOOD PRESSURE: 136 MMHG | DIASTOLIC BLOOD PRESSURE: 80 MMHG | BODY MASS INDEX: 31.35 KG/M2 | TEMPERATURE: 98.4 F | WEIGHT: 219 LBS | OXYGEN SATURATION: 97 % | HEIGHT: 70 IN | HEART RATE: 76 BPM

## 2018-12-14 DIAGNOSIS — M54.50 ACUTE RIGHT-SIDED LOW BACK PAIN WITHOUT SCIATICA: Primary | ICD-10-CM

## 2018-12-14 RX ORDER — LIDOCAINE 50 MG/G
PATCH TOPICAL
Qty: 30 EACH | Refills: 0 | Status: SHIPPED | OUTPATIENT
Start: 2018-12-14 | End: 2019-04-12 | Stop reason: ALTCHOICE

## 2018-12-14 RX ORDER — TRAMADOL HYDROCHLORIDE 50 MG/1
TABLET ORAL
Qty: 42 TAB | Refills: 0 | Status: SHIPPED | OUTPATIENT
Start: 2018-12-14 | End: 2019-01-22 | Stop reason: ALTCHOICE

## 2018-12-14 RX ORDER — PREDNISONE 20 MG/1
TABLET ORAL
Qty: 20 TAB | Refills: 0 | Status: SHIPPED | OUTPATIENT
Start: 2018-12-14 | End: 2019-01-16

## 2018-12-14 NOTE — PROGRESS NOTES
Chief Complaint   Patient presents with    Back swelling       Depression Screening:  PHQ over the last two weeks 10/23/2018   PHQ Not Done -   Little interest or pleasure in doing things Not at all   Feeling down, depressed, irritable, or hopeless Not at all   Total Score PHQ 2 0       Learning Assessment:  Learning Assessment 11/6/2018   PRIMARY LEARNER Patient   HIGHEST LEVEL OF EDUCATION - PRIMARY LEARNER  GRADUATED HIGH SCHOOL OR GED   BARRIERS PRIMARY LEARNER NONE   CO-LEARNER CAREGIVER No   PRIMARY LANGUAGE ENGLISH    NEED -   LEARNER PREFERENCE PRIMARY DEMONSTRATION   ANSWERED BY patient   RELATIONSHIP SELF       Abuse Screening:  Abuse Screening Questionnaire 11/6/2018   Do you ever feel afraid of your partner? N   Are you in a relationship with someone who physically or mentally threatens you? N   Is it safe for you to go home? Y       Fall Risk  Fall Risk Assessment, last 12 mths 10/23/2018   Able to walk? Yes   Fall in past 12 months? No               1. Have you been to the ER, urgent care clinic since your last visit? Hospitalized since your last visit? No    2. Have you seen or consulted any other health care providers outside of the [address] [place] [address] since your last visit? Include any pap smears or colon screening.  No

## 2018-12-16 NOTE — PROGRESS NOTES
The patient presents to the office today with the chief complaint of low back pain    HPI    The patient complains of 5 days of right low back pain. There was no clear inciting events but the patient was putting out [place] decorations. The patient notes swelling at that area. The patient took a course of Prednisone but the problem persists. The patient remains on Coumadin. He denies trauma. Review of Systems   Respiratory: Negative for shortness of breath. Cardiovascular: Negative for chest pain and leg swelling. Musculoskeletal: Positive for back pain. Allergies   Allergen Reactions    Amoxicillin Itching    Augmentin [Amoxicillin-Pot Clavulanate] Itching    Hibiclens [Chlorhexidine Gluconate] Itching    Milk Containing Products Diarrhea    Penicillins Rash    Requip [Ropinirole] Nausea and Vomiting       Current Outpatient Medications   Medication Sig Dispense Refill    predniSONE (DELTASONE) 20 mg tablet 3 tabs daily x 3 days, 2 tabs daily x 3 days, 1 tab daily x 3 days, 1/2 tab daily x 3 days 20 Tab 0    lidocaine (LIDODERM) 5 % Apply patch to the affected area for 12 hours a day and remove for 12 hours a day. 30 Each 0    traMADol (ULTRAM) 50 mg tablet 1 to 2 tab every eight hours as needed for ain 42 Tab 0    methylPREDNISolone (MEDROL DOSEPACK) 4 mg tablet Per dose pack instructions 1 Dose Pack 0    nystatin (MYCOSTATIN) topical cream APPLY EXTERNALLY TO THE AFFECTED AREA TWICE DAILY 15 g 0    celecoxib (CELEBREX) 200 mg capsule TAKE 1 CAPSULE BY MOUTH TWICE DAILY 180 Cap 0    lisinopril-hydroCHLOROthiazide (PRINZIDE, ZESTORETIC) 20-12.5 mg per tablet TAKE 1 TABLET BY MOUTH DAILY 90 Tab 0    diclofenac (VOLTAREN) 1 % gel Apply 4 g to affected area four (4) times daily. Maximum 16 grams per joint per day.  Dispense 5 100 gram tubes 5 Each 0    raNITIdine (ZANTAC) 150 mg tablet TK 1 T PO HS  1    labetalol (NORMODYNE) 100 mg tablet TAKE ONE TABLET BY MOUTH TWICE DAILY 180 Tab 0    ALPRAZolam (XANAX) 0.5 mg tablet Take one half(1/2) tab to one(1) tab by mouth at bedtime as needed for sleep 30 Tab 0    lansoprazole (PREVACID) 30 mg capsule TAKE 1 CAPSULE DAILY BEFOREBREAKFAST 90 Cap 3    warfarin (COUMADIN) 5 mg tablet TAKE 2 AND 1/2 TABLETS BY MOUTH DAILY OR AS DIRECTED 75 Tab 0    butalbital-acetaminophen-caff (FIORICET) -40 mg per capsule Take 2 capsules every 8 hours as needed for headache 126 Cap 1    warfarin (COUMADIN) 3 mg tablet Or as directed 30 Tab 3    metaxalone (SKELAXIN) 800 mg tablet Take 1 Tab by mouth three (3) times daily. Indications: Muscle Spasm (Patient taking differently: Take 800 mg by mouth three (3) times daily as needed. Indications: Muscle Spasm) 90 Tab 2    montelukast (SINGULAIR) 10 mg tablet TAKE 1 TABLET BY MOUTH EVERY DAY (Patient taking differently: TAKE 1 TABLET BY MOUTH EVERY HS) 90 Tab 0    acetaminophen (TYLENOL) 500 mg tablet Take 1,000 mg by mouth every six (6) hours as needed for Pain.          Past Medical History:   Diagnosis Date    Arthritis     Bleeding     Chronic pain     knee and shoulder    GERD (gastroesophageal reflux disease)     Headache(784.0)     migraine    High cholesterol     Hypertension     Lower back pain 11/6/2010    Other chest pain     Pure hypercholesterolemia     Right buttock pain 11/6/2010    Right foot pain     Rotator cuff tear     left-since 2010, worsened tear Jan.    Rotator cuff tear, right     Spinal stenosis     Tendonitis, tibialis     anterior    Thromboembolus ([address]     3 after sx last one 2000       Past Surgical History:   Procedure Laterality Date    FOOT/TOES SURGERY PROC UNLISTED      HX BACK SURGERY      HX KNEE REPLACEMENT Left     HX ORTHOPAEDIC  06-25-12    Right foot with excision of bursa and adipose tissue from fifth metatarsal base by Dr. Sterling Mcconnell      lower back (1992 and 2000)    HX OTHER SURGICAL      left foot (2008)    HX OTHER SURGICAL      Retina repair     LAMINOTOMY      NERVE BLOCK         Social History     Socioeconomic History    Marital status:      Spouse name: Not on file    Number of children: Not on file    Years of education: Not on file    Highest education level: Not on file   Social Needs    Financial resource strain: Not on file    Food insecurity - worry: Not on file    Food insecurity - inability: Not on file    Transportation needs - medical: Not on file   Survmetrics needs - non-medical: Not on file   Occupational History    Not on file   Tobacco Use    Smoking status: Never Smoker    Smokeless tobacco: Never Used   Substance and Sexual Activity    Alcohol use: No    Drug use: No    Sexual activity: Not Currently   Other Topics Concern    Not on file   Social History Narrative    Not on file       Patient does not have an advanced directive on file    Visit Vitals  /80 (BP 1 Location: Left arm, BP Patient Position: Sitting)   Pulse 76   Temp 98.4 °F (36.9 °C) (Tympanic)   Ht 5' 10\" (1.778 m)   Wt 219 lb (99.3 kg)   SpO2 97%   BMI 31.42 kg/m²       Physical Exam   Musculoskeletal:   Area of swelling - most likely tight muscle in right lumbar muscles.   The area is tender         Admission on 11/14/2018, Discharged on 11/15/2018   Component Date Value Ref Range Status    Prothrombin time 11/14/2018 14.0  11.5 - 15.2 sec Final    INR 11/14/2018 1.1  0.8 - 1.2   Final    Comment:            INR Therapeutic Ranges         (on stable oral anticoagulant):     INDICATION                INR  DVT/PE/Atrial Fib          2.0-3.0  MI/Mechanical Heart Valve  2.5-3.5      Prothrombin time 11/15/2018 14.8  11.5 - 15.2 sec Final    INR 11/15/2018 1.2  0.8 - 1.2   Final    Comment:            INR Therapeutic Ranges         (on stable oral anticoagulant):     INDICATION                INR  DVT/PE/Atrial Fib          2.0-3.0  MI/Mechanical Heart Valve  2.5-3.5      WBC 11/15/2018 11.4  4.6 - 13.2 K/uL Final    RBC 11/15/2018 4.03* 4.70 - 5.50 M/uL Final    HGB 11/15/2018 13.2  13.0 - 16.0 g/dL Final    HCT 11/15/2018 39.0  36.0 - 48.0 % Final    MCV 11/15/2018 96.8  74.0 - 97.0 FL Final    MCH 11/15/2018 32.8  24.0 - 34.0 PG Final    MCHC 11/15/2018 33.8  31.0 - 37.0 g/dL Final    RDW 11/15/2018 13.0  11.6 - 14.5 % Final    PLATELET 24/88/4958 942  135 - 420 K/uL Final    MPV 11/15/2018 11.3  9.2 - 11.8 FL Final    Sodium 11/15/2018 139  136 - 145 mmol/L Final    Potassium 11/15/2018 3.4* 3.5 - 5.5 mmol/L Final    Chloride 11/15/2018 102  100 - 108 mmol/L Final    CO2 11/15/2018 27  21 - 32 mmol/L Final    Anion gap 11/15/2018 10  3.0 - 18 mmol/L Final    Glucose 11/15/2018 103* 74 - 99 mg/dL Final    BUN 11/15/2018 12  7.0 - 18 MG/DL Final    Creatinine 11/15/2018 0.75  0.6 - 1.3 MG/DL Final    BUN/Creatinine ratio 11/15/2018 16  12 - 20   Final    GFR est AA 11/15/2018 >60  >60 ml/min/1.73m2 Final    GFR est non-AA 11/15/2018 >60  >60 ml/min/1.73m2 Final    Comment: (NOTE)  Estimated GFR is calculated using the Modification of Diet in Renal   Disease (MDRD) Study equation, reported for both  Americans   (GFRAA) and non- Americans (GFRNA), and normalized to 1.73m2   body surface area. The physician must decide which value applies to   the patient. The MDRD study equation should only be used in   individuals age 25 or older. It has not been validated for the   following: pregnant women, patients with serious comorbid conditions,   or on certain medications, or persons with extremes of body size,   muscle mass, or nutritional status.       Calcium 11/15/2018 8.4* 8.5 - 10.1 MG/DL Final   Admission on 11/14/2018, Discharged on 11/14/2018   Component Date Value Ref Range Status    WBC 11/14/2018 11.4  4.6 - 13.2 K/uL Final    RBC 11/14/2018 4.20* 4.70 - 5.50 M/uL Final    HGB 11/14/2018 13.5  13.0 - 16.0 g/dL Final    HCT 11/14/2018 40.3  36.0 - 48.0 % Final    MCV 11/14/2018 96.0  74.0 - 97.0 FL Final    MCH 11/14/2018 32.1  24.0 - 34.0 PG Final    MCHC 11/14/2018 33.5  31.0 - 37.0 g/dL Final    RDW 11/14/2018 12.8  11.6 - 14.5 % Final    PLATELET 17/50/9200 544  135 - 420 K/uL Final    MPV 11/14/2018 10.5  9.2 - 11.8 FL Final    NEUTROPHILS 11/14/2018 81* 40 - 73 % Final    LYMPHOCYTES 11/14/2018 8* 21 - 52 % Final    MONOCYTES 11/14/2018 11* 3 - 10 % Final    EOSINOPHILS 11/14/2018 0  0 - 5 % Final    BASOPHILS 11/14/2018 0  0 - 2 % Final    ABS. NEUTROPHILS 11/14/2018 9.2* 1.8 - 8.0 K/UL Final    ABS. LYMPHOCYTES 11/14/2018 0.9  0.9 - 3.6 K/UL Final    ABS. MONOCYTES 11/14/2018 1.3* 0.05 - 1.2 K/UL Final    ABS. EOSINOPHILS 11/14/2018 0.0  0.0 - 0.4 K/UL Final    ABS. BASOPHILS 11/14/2018 0.0  0.0 - 0.1 K/UL Final    DF 11/14/2018 AUTOMATED    Final    Sodium 11/14/2018 135* 136 - 145 mmol/L Final    Potassium 11/14/2018 3.6  3.5 - 5.5 mmol/L Final    Chloride 11/14/2018 99* 100 - 108 mmol/L Final    CO2 11/14/2018 28  21 - 32 mmol/L Final    Anion gap 11/14/2018 8  3.0 - 18 mmol/L Final    Glucose 11/14/2018 125* 74 - 99 mg/dL Final    BUN 11/14/2018 11  7.0 - 18 MG/DL Final    Creatinine 11/14/2018 0.90  0.6 - 1.3 MG/DL Final    BUN/Creatinine ratio 11/14/2018 12  12 - 20   Final    GFR est AA 11/14/2018 >60  >60 ml/min/1.73m2 Final    GFR est non-AA 11/14/2018 >60  >60 ml/min/1.73m2 Final    Comment: (NOTE)  Estimated GFR is calculated using the Modification of Diet in Renal   Disease (MDRD) Study equation, reported for both  Americans   (GFRAA) and non- Americans (GFRNA), and normalized to 1.73m2   body surface area. The physician must decide which value applies to   the patient. The MDRD study equation should only be used in   individuals age 25 or older.  It has not been validated for the   following: pregnant women, patients with serious comorbid conditions,   or on certain medications, or persons with extremes of body size, muscle mass, or nutritional status.  Calcium 11/14/2018 8.9  8.5 - 10.1 MG/DL Final    Bilirubin, total 11/14/2018 0.6  0.2 - 1.0 MG/DL Final    ALT (SGPT) 11/14/2018 26  16 - 61 U/L Final    AST (SGOT) 11/14/2018 20  15 - 37 U/L Final    Alk.  phosphatase 11/14/2018 52  45 - 117 U/L Final    Protein, total 11/14/2018 6.7  6.4 - 8.2 g/dL Final    Albumin 11/14/2018 3.4  3.4 - 5.0 g/dL Final    Globulin 11/14/2018 3.3  2.0 - 4.0 g/dL Final    A-G Ratio 11/14/2018 1.0  0.8 - 1.7   Final    Color 11/14/2018 YELLOW    Final    Appearance 11/14/2018 CLEAR    Final    Specific gravity 11/14/2018 1.019  1.005 - 1.030   Final    pH (UA) 11/14/2018 6.5  5.0 - 8.0   Final    Protein 11/14/2018 NEGATIVE   NEG mg/dL Final    Glucose 11/14/2018 NEGATIVE   NEG mg/dL Final    Ketone 11/14/2018 NEGATIVE   NEG mg/dL Final    Bilirubin 11/14/2018 NEGATIVE   NEG   Final    Blood 11/14/2018 MODERATE* NEG   Final    Urobilinogen 11/14/2018 0.2  0.2 - 1.0 EU/dL Final    Nitrites 11/14/2018 NEGATIVE   NEG   Final    Leukocyte Esterase 11/14/2018 SMALL* NEG   Final    Lactic Acid (POC) 11/14/2018 0.78  0.40 - 2.00 mmol/L Final    WBC 11/14/2018 0 to 3  0 - 4 /hpf Final    RBC 11/14/2018 11 to 20  0 - 5 /hpf Final    Epithelial cells 11/14/2018 FEW  0 - 5 /lpf Final   Admission on 11/12/2018, Discharged on 11/13/2018   Component Date Value Ref Range Status    Crossmatch Expiration 11/12/2018 11/15/2018   Final    ABO/Rh(D) 11/12/2018 B POSITIVE   Final    Antibody screen 11/12/2018 NEG   Final    Prothrombin time 11/12/2018 13.8  11.5 - 15.2 sec Final    INR 11/12/2018 1.1  0.8 - 1.2   Final    Comment:            INR Therapeutic Ranges         (on stable oral anticoagulant):     INDICATION                INR  DVT/PE/Atrial Fib          2.0-3.0  MI/Mechanical Heart Valve  2.5-3.5      Sodium 11/13/2018 140  136 - 145 mmol/L Final    Potassium 11/13/2018 4.1  3.5 - 5.5 mmol/L Final    Chloride 11/13/2018 104  100 - 108 mmol/L Final    CO2 11/13/2018 26  21 - 32 mmol/L Final    Anion gap 11/13/2018 10  3.0 - 18 mmol/L Final    Glucose 11/13/2018 105* 74 - 99 mg/dL Final    BUN 11/13/2018 19* 7.0 - 18 MG/DL Final    Creatinine 11/13/2018 0.94  0.6 - 1.3 MG/DL Final    BUN/Creatinine ratio 11/13/2018 20  12 - 20   Final    GFR est AA 11/13/2018 >60  >60 ml/min/1.73m2 Final    GFR est non-AA 11/13/2018 >60  >60 ml/min/1.73m2 Final    Comment: (NOTE)  Estimated GFR is calculated using the Modification of Diet in Renal   Disease (MDRD) Study equation, reported for both  Americans   (GFRAA) and non- Americans (GFRNA), and normalized to 1.73m2   body surface area. The physician must decide which value applies to   the patient. The MDRD study equation should only be used in   individuals age 25 or older. It has not been validated for the   following: pregnant women, patients with serious comorbid conditions,   or on certain medications, or persons with extremes of body size,   muscle mass, or nutritional status.       Calcium 11/13/2018 8.4* 8.5 - 10.1 MG/DL Final    WBC 11/13/2018 9.0  4.6 - 13.2 K/uL Final    RBC 11/13/2018 3.74* 4.70 - 5.50 M/uL Final    HGB 11/13/2018 11.9* 13.0 - 16.0 g/dL Final    HCT 11/13/2018 36.4  36.0 - 48.0 % Final    MCV 11/13/2018 97.3* 74.0 - 97.0 FL Final    MCH 11/13/2018 31.8  24.0 - 34.0 PG Final    MCHC 11/13/2018 32.7  31.0 - 37.0 g/dL Final    RDW 11/13/2018 13.4  11.6 - 14.5 % Final    PLATELET 74/46/3954 121  135 - 420 K/uL Final    MPV 11/13/2018 11.3  9.2 - 11.8 [facility] Final   Hospital Outpatient Visit on 11/03/2018   Component Date Value Ref Range Status    Ventricular Rate 11/03/2018 76  BPM Final    Atrial Rate 11/03/2018 76  BPM Final    P-R Interval 11/03/2018 180  ms Final    QRS Duration 11/03/2018 94  ms Final    Q-T Interval 11/03/2018 366  ms Final    QTC Calculation (Bezet) 11/03/2018 411  ms Final    Calculated P Axis 11/03/2018 53 degrees Final    Calculated R Axis 11/03/2018 5  degrees Final    Calculated T Axis 11/03/2018 63  degrees Final    Diagnosis 11/03/2018    Final                    Value:Normal sinus rhythm  Possible Left atrial enlargement  Inferior infarct , age undetermined  Abnormal ECG  When compared with ECG of 31-JAN-2018 11:29,  No significant change was found  Confirmed by Zaina Atkins ([id number]) on 11/4/2018 9:40:19 PM      WBC 11/03/2018 4.7  4.6 - 13.2 K/uL Final    RBC 11/03/2018 4.13* 4.70 - 5.50 M/uL Final    HGB 11/03/2018 13.1  13.0 - 16.0 g/dL Final    HCT 11/03/2018 39.0  36.0 - 48.0 % Final    MCV 11/03/2018 94.4  74.0 - 97.0 FL Final    MCH 11/03/2018 31.7  24.0 - 34.0 PG Final    MCHC 11/03/2018 33.6  31.0 - 37.0 g/dL Final    RDW 11/03/2018 13.3  11.6 - 14.5 % Final    PLATELET 89/41/4348 098  135 - 420 K/uL Final    MPV 11/03/2018 10.8  9.2 - 11.8 FL Final    Sodium 11/03/2018 141  136 - 145 mmol/L Final    Potassium 11/03/2018 3.9  3.5 - 5.5 mmol/L Final    Chloride 11/03/2018 107  100 - 108 mmol/L Final    CO2 11/03/2018 29  21 - 32 mmol/L Final    Anion gap 11/03/2018 5  3.0 - 18 mmol/L Final    Glucose 11/03/2018 115* 74 - 99 mg/dL Final    BUN 11/03/2018 12  7.0 - 18 MG/DL Final    Creatinine 11/03/2018 0.91  0.6 - 1.3 MG/DL Final    BUN/Creatinine ratio 11/03/2018 13  12 - 20   Final    GFR est AA 11/03/2018 >60  >60 ml/min/1.73m2 Final    GFR est non-AA 11/03/2018 >60  >60 ml/min/1.73m2 Final    Comment: (NOTE)  Estimated GFR is calculated using the Modification of Diet in Renal   Disease (MDRD) Study equation, reported for both  Americans   (GFRAA) and non- Americans (GFRNA), and normalized to 1.73m2   body surface area. The physician must decide which value applies to   the patient. The MDRD study equation should only be used in   individuals age 25 or older.  It has not been validated for the   following: pregnant women, patients with serious comorbid conditions, or on certain medications, or persons with extremes of body size,   muscle mass, or nutritional status.       Calcium 11/03/2018 8.4* 8.5 - 10.1 MG/DL Final    Color 11/03/2018 YELLOW    Final    Appearance 11/03/2018 CLEAR    Final    Specific gravity 11/03/2018 1.013  1.005 - 1.030   Final    pH (UA) 11/03/2018 6.0  5.0 - 8.0   Final    Protein 11/03/2018 NEGATIVE   NEG mg/dL Final    Glucose 11/03/2018 NEGATIVE   NEG mg/dL Final    Ketone 11/03/2018 NEGATIVE   NEG mg/dL Final    Bilirubin 11/03/2018 NEGATIVE   NEG   Final    Blood 11/03/2018 NEGATIVE   NEG   Final    Urobilinogen 11/03/2018 0.2  0.2 - 1.0 EU/dL Final    Nitrites 11/03/2018 NEGATIVE   NEG   Final    Leukocyte Esterase 11/03/2018 NEGATIVE   NEG   Final    Special Requests: 11/03/2018 NO SPECIAL REQUESTS    Final    Culture result: 11/03/2018 NO GROWTH 1 DAY    Final   Anti-Coag visit on 10/16/2018   Component Date Value Ref Range Status    VALID INTERNAL CONTROL POC 10/16/2018 Yes   Final    INR POC 10/16/2018 1.6   Final   Office Visit on 10/08/2018   Component Date Value Ref Range Status    Glucose (UA POC) 10/08/2018 1+  Negative Final    Bilirubin (UA POC) 10/08/2018 Negative  Negative Final    Ketones (UA POC) 10/08/2018 Negative  Negative Final    Specific gravity (UA POC) 10/08/2018 1.010  1.001 - 1.035 Final    Blood (UA POC) 10/08/2018 Negative  Negative Final    pH (UA POC) 10/08/2018 6.5  4.6 - 8.0 Final    Protein (UA POC) 10/08/2018 Negative  Negative Final    Urobilinogen (UA POC) 10/08/2018 0.2 mg/dL  0.2 - 1 Final    Nitrites (UA POC) 10/08/2018 Negative  Negative Final    Leukocyte esterase (UA POC) 10/08/2018 Negative  Negative Final   Office Visit on 09/20/2018   Component Date Value Ref Range Status    Color (UA POC) 09/20/2018 Yellow   Final    Clarity (UA POC) 09/20/2018 Clear   Final    Glucose (UA POC) 09/20/2018 Negative  Negative Final    Bilirubin (UA POC) 09/20/2018 Negative  Negative Final    Ketones (UA POC) 09/20/2018 Negative  Negative Final    Specific gravity (UA POC) 09/20/2018 1.015  1.001 - 1.035 Final    Blood (UA POC) 09/20/2018 Trace  Negative Final    pH (UA POC) 09/20/2018 5.5  4.6 - 8.0 Final    Protein (UA POC) 09/20/2018 Negative  Negative Final    Urobilinogen (UA POC) 09/20/2018 0.2 mg/dL  0.2 - 1 Final    Nitrites (UA POC) 09/20/2018 Negative  Negative Final    Leukocyte esterase (UA POC) 09/20/2018 Negative  Negative Final       .No results found for any visits on 12/14/18. Assessment / Plan      ICD-10-CM ICD-9-CM    1. Acute right-sided low back pain without sciatica M54.5 724.2 traMADol (ULTRAM) 50 mg tablet       Tramadol for pain  - one week prescribed  Lidoderm patch  Another course of Prednisone  he was advised to continue his maintenance medications  he is to call if symptoms persist over five days      Follow-up Disposition:  Return in about 4 months (around 4/14/2019). I asked Lonia Harada if he has any questions and I answered the questions.   Lonia Harada states that he understands the treatment plan and agrees with the treatment plan

## 2018-12-20 RX ORDER — BUTALBITAL, ACETAMINOPHEN AND CAFFEINE 300; 40; 50 MG/1; MG/1; MG/1
CAPSULE ORAL
Qty: 126 CAP | Refills: 1 | Status: SHIPPED | OUTPATIENT
Start: 2018-12-20 | End: 2019-01-28

## 2018-12-20 NOTE — TELEPHONE ENCOUNTER
Requested Prescriptions     Pending Prescriptions Disp Refills    butalbital-acetaminophen-caff (FIORICET) -40 mg per capsule 126 Cap 1     Sig: Take 2 capsules every 8 hours as needed for headache      last filled 03/2017

## 2018-12-31 ENCOUNTER — TELEPHONE (OUTPATIENT)
Dept: INTERNAL MEDICINE CLINIC | Age: 63
End: 2018-12-31

## 2018-12-31 NOTE — TELEPHONE ENCOUNTER
Patient in facility to dispose of twelve lovenox injections  Twelve unused lovenox injections dispose of in sharps with nurse Gill Pack witness

## 2019-01-08 ENCOUNTER — DOCUMENTATION ONLY (OUTPATIENT)
Dept: ORTHOPEDIC SURGERY | Facility: CLINIC | Age: 64
End: 2019-01-08

## 2019-01-08 ENCOUNTER — OFFICE VISIT (OUTPATIENT)
Dept: ORTHOPEDIC SURGERY | Facility: CLINIC | Age: 64
End: 2019-01-08

## 2019-01-08 VITALS
SYSTOLIC BLOOD PRESSURE: 131 MMHG | HEART RATE: 88 BPM | WEIGHT: 222 LBS | TEMPERATURE: 97.3 F | RESPIRATION RATE: 16 BRPM | BODY MASS INDEX: 31.78 KG/M2 | OXYGEN SATURATION: 98 % | HEIGHT: 70 IN | DIASTOLIC BLOOD PRESSURE: 86 MMHG

## 2019-01-08 DIAGNOSIS — M75.121 COMPLETE TEAR OF RIGHT ROTATOR CUFF: Primary | ICD-10-CM

## 2019-01-08 NOTE — H&P (VIEW-ONLY)
Patient: Joel Lawton                MRN: [id number]       SSN: [id number] YOB: 1955        AGE: 70 y.o. SEX: male Body mass index is 31.85 kg/m². PCP: Adán Nunez MD 
01/08/19 Chief Complaint: Right shoulder follow up HISTORY OF PRESENT ILLNESS:   Linwood Rutledge returns to the office today for his right shoulder. He continues to have shoulder pain and dysfunction, although it has improved somewhat. He had his prostate surgery. He has recovered from that. He is now interested in rotator cuff surgery. Past Medical History:  
Diagnosis Date  Arthritis  Bleeding  Chronic pain   
 knee and shoulder  GERD (gastroesophageal reflux disease)  Headache(784.0)   
 migraine  High cholesterol  Hypertension  Lower back pain 11/6/2010  Other chest pain  Pure hypercholesterolemia  Right buttock pain 11/6/2010  Right foot pain  Rotator cuff tear   
 left-since 2010, worsened tear Jan.  
 Rotator cuff tear, right  Spinal stenosis  Tendonitis, tibialis   
 anterior  Thromboembolus ([address] 3 after sx last one 2000 Family History Problem Relation Age of Onset  Arthritis-rheumatoid Mother  Dementia Mother  Heart Disease Father  Cancer Father  Hypertension Other  Cancer Brother Current Outpatient Medications Medication Sig Dispense Refill  butalbital-acetaminophen-caff (FIORICET) -40 mg per capsule Take 2 capsules every 8 hours as needed for headache 126 Cap 1  
 lidocaine (LIDODERM) 5 % Apply patch to the affected area for 12 hours a day and remove for 12 hours a day.  30 Each 0  
 traMADol (ULTRAM) 50 mg tablet 1 to 2 tab every eight hours as needed for ain 42 Tab 0  
 celecoxib (CELEBREX) 200 mg capsule TAKE 1 CAPSULE BY MOUTH TWICE DAILY 180 Cap 0  
 lisinopril-hydroCHLOROthiazide (PRINZIDE, ZESTORETIC) 20-12.5 mg per tablet TAKE 1 TABLET BY MOUTH DAILY 90 Tab 0  
  diclofenac (VOLTAREN) 1 % gel Apply 4 g to affected area four (4) times daily. Maximum 16 grams per joint per day. Dispense 5 100 gram tubes 5 Each 0  
 raNITIdine (ZANTAC) 150 mg tablet TK 1 T PO HS  1  
 labetalol (NORMODYNE) 100 mg tablet TAKE ONE TABLET BY MOUTH TWICE DAILY 180 Tab 0  ALPRAZolam (XANAX) 0.5 mg tablet Take one half(1/2) tab to one(1) tab by mouth at bedtime as needed for sleep 30 Tab 0  
 lansoprazole (PREVACID) 30 mg capsule TAKE 1 CAPSULE DAILY BEFOREBREAKFAST 90 Cap 3  warfarin (COUMADIN) 5 mg tablet TAKE 2 AND 1/2 TABLETS BY MOUTH DAILY OR AS DIRECTED 75 Tab 0  
 warfarin (COUMADIN) 3 mg tablet Or as directed 30 Tab 3  
 metaxalone (SKELAXIN) 800 mg tablet Take 1 Tab by mouth three (3) times daily. Indications: Muscle Spasm (Patient taking differently: Take 800 mg by mouth three (3) times daily as needed. Indications: Muscle Spasm) 90 Tab 2  
 montelukast (SINGULAIR) 10 mg tablet TAKE 1 TABLET BY MOUTH EVERY DAY (Patient taking differently: TAKE 1 TABLET BY MOUTH EVERY HS) 90 Tab 0  
 acetaminophen (TYLENOL) 500 mg tablet Take 1,000 mg by mouth every six (6) hours as needed for Pain.  predniSONE (DELTASONE) 20 mg tablet 3 tabs daily x 3 days, 2 tabs daily x 3 days, 1 tab daily x 3 days, 1/2 tab daily x 3 days 20 Tab 0  
 methylPREDNISolone (MEDROL DOSEPACK) 4 mg tablet Per dose pack instructions 1 Dose Pack 0  
 nystatin (MYCOSTATIN) topical cream APPLY EXTERNALLY TO THE AFFECTED AREA TWICE DAILY 15 g 0 Allergies Allergen Reactions  Amoxicillin Itching  Augmentin [Amoxicillin-Pot Clavulanate] Itching  Hibiclens [Chlorhexidine Gluconate] Itching  Milk Containing Products Diarrhea  Penicillins Rash  Requip [Ropinirole] Nausea and Vomiting Past Surgical History:  
Procedure Laterality Date  FOOT/TOES SURGERY PROC UNLISTED  HX BACK SURGERY    
 HX KNEE REPLACEMENT Left [facility]  06-25-12 Right foot with excision of bursa and adipose tissue from fifth metatarsal base by Dr. Leonid Loyd  HX OTHER SURGICAL    
 lower back (1992 and 2000)  HX OTHER SURGICAL    
 left foot (2008)  HX OTHER SURGICAL Retina repair  LAMINOTOMY  NERVE BLOCK Social History Socioeconomic History  Marital status:  Spouse name: Not on file  Number of children: Not on file  Years of education: Not on file  Highest education level: Not on file Social Needs  Financial resource strain: Not on file  Food insecurity - worry: Not on file  Food insecurity - inability: Not on file  Transportation needs - medical: Not on file  Transportation needs - non-medical: Not on file Occupational History  Not on file Tobacco Use  Smoking status: Never Smoker  Smokeless tobacco: Never Used Substance and Sexual Activity  Alcohol use: No  
 Drug use: No  
 Sexual activity: Not Currently Other Topics Concern  Not on file Social History Narrative  Not on file REVIEW OF SYSTEMS:   
 
No changes from previous review of systems unless noted. PHYSICAL EXAMINATION: 
Visit Vitals /86 (BP 1 Location: Left arm, BP Patient Position: Sitting) Pulse 88 Temp 97.3 °F (36.3 °C) (Oral) Resp 16 Ht 5' 10\" (1.778 m) Wt 222 lb (100.7 kg) SpO2 98% BMI 31.85 kg/m² Body mass index is 31.85 kg/m². GENERAL: Alert and oriented x3, in no acute distress. HEENT: Normocephalic, atraumatic. RESP: Non labored breathing. SKIN: No rashes or lesions noted. PHYSICAL EXAMINATION:   Physical exam of the right shoulder with no ecchymosis and no erythema. No effusion. He has forward flexion actively to 160 degrees. He has pain and weakness with supraspinatus and infraspinatus and subscapularis testing. Kole deformity is present. Neurovascularly intact distally.   
 
ASSESSMENT/PLAN:   Carlene Andersen is a 70year-old male with massive right rotator cuff tear. We have known about this for a few months. He had prostate surgery, which he is now done with. He would like to get his shoulder work done. This would be an arthroscopic rotator cuff repair. I would do my best to repair as much as possible. If this was not repairable, he may benefit from something like an augmentation patch, such as a superior capsular reconstruction, although his age makes it less likely to do that. We will get him set up for surgery in the near future. All the risks and benefits were discussed with him.    
 
 
 
 
Electronically signed by: Merlin Vazquez MD

## 2019-01-08 NOTE — PROGRESS NOTES
Patient: Isabel Ferguson                MRN: [id number]       SSN: [id number]  YOB: 1955        AGE: 70 y.o. SEX: male  Body mass index is 31.85 kg/m². PCP: Maged Howard MD  01/08/19    Chief Complaint: Right shoulder follow up    HISTORY OF PRESENT ILLNESS:   Deanne Fox returns to the office today for his right shoulder. He continues to have shoulder pain and dysfunction, although it has improved somewhat. He had his prostate surgery. He has recovered from that. He is now interested in rotator cuff surgery. Past Medical History:   Diagnosis Date    Arthritis     Bleeding     Chronic pain     knee and shoulder    GERD (gastroesophageal reflux disease)     Headache(784.0)     migraine    High cholesterol     Hypertension     Lower back pain 11/6/2010    Other chest pain     Pure hypercholesterolemia     Right buttock pain 11/6/2010    Right foot pain     Rotator cuff tear     left-since 2010, worsened tear Jan.    Rotator cuff tear, right     Spinal stenosis     Tendonitis, tibialis     anterior    Thromboembolus ([address]     3 after sx last one 2000       Family History   Problem Relation Age of Onset   [facility] Arthritis-rheumatoid Mother     Dementia Mother     Heart Disease Father     Cancer Father     Hypertension Other     Cancer Brother        Current Outpatient Medications   Medication Sig Dispense Refill    butalbital-acetaminophen-caff (FIORICET) -40 mg per capsule Take 2 capsules every 8 hours as needed for headache 126 Cap 1    lidocaine (LIDODERM) 5 % Apply patch to the affected area for 12 hours a day and remove for 12 hours a day.  30 Each 0    traMADol (ULTRAM) 50 mg tablet 1 to 2 tab every eight hours as needed for ain 42 Tab 0    celecoxib (CELEBREX) 200 mg capsule TAKE 1 CAPSULE BY MOUTH TWICE DAILY 180 Cap 0    lisinopril-hydroCHLOROthiazide (PRINZIDE, ZESTORETIC) 20-12.5 mg per tablet TAKE 1 TABLET BY MOUTH DAILY 90 Tab 0    diclofenac (VOLTAREN) 1 % gel Apply 4 g to affected area four (4) times daily. Maximum 16 grams per joint per day. Dispense 5 100 gram tubes 5 Each 0    raNITIdine (ZANTAC) 150 mg tablet TK 1 T PO HS  1    labetalol (NORMODYNE) 100 mg tablet TAKE ONE TABLET BY MOUTH TWICE DAILY 180 Tab 0    ALPRAZolam (XANAX) 0.5 mg tablet Take one half(1/2) tab to one(1) tab by mouth at bedtime as needed for sleep 30 Tab 0    lansoprazole (PREVACID) 30 mg capsule TAKE 1 CAPSULE DAILY BEFOREBREAKFAST 90 Cap 3    warfarin (COUMADIN) 5 mg tablet TAKE 2 AND 1/2 TABLETS BY MOUTH DAILY OR AS DIRECTED 75 Tab 0    warfarin (COUMADIN) 3 mg tablet Or as directed 30 Tab 3    metaxalone (SKELAXIN) 800 mg tablet Take 1 Tab by mouth three (3) times daily. Indications: Muscle Spasm (Patient taking differently: Take 800 mg by mouth three (3) times daily as needed. Indications: Muscle Spasm) 90 Tab 2    montelukast (SINGULAIR) 10 mg tablet TAKE 1 TABLET BY MOUTH EVERY DAY (Patient taking differently: TAKE 1 TABLET BY MOUTH EVERY HS) 90 Tab 0    acetaminophen (TYLENOL) 500 mg tablet Take 1,000 mg by mouth every six (6) hours as needed for Pain.       predniSONE (DELTASONE) 20 mg tablet 3 tabs daily x 3 days, 2 tabs daily x 3 days, 1 tab daily x 3 days, 1/2 tab daily x 3 days 20 Tab 0    methylPREDNISolone (MEDROL DOSEPACK) 4 mg tablet Per dose pack instructions 1 Dose Pack 0    nystatin (MYCOSTATIN) topical cream APPLY EXTERNALLY TO THE AFFECTED AREA TWICE DAILY 15 g 0       Allergies   Allergen Reactions    Amoxicillin Itching    Augmentin [Amoxicillin-Pot Clavulanate] Itching    Hibiclens [Chlorhexidine Gluconate] Itching    Milk Containing Products Diarrhea    Penicillins Rash    Requip [Ropinirole] Nausea and Vomiting       Past Surgical History:   Procedure Laterality Date    FOOT/TOES SURGERY PROC UNLISTED      HX BACK SURGERY      HX KNEE REPLACEMENT Left     HX ORTHOPAEDIC  06-25-12    Right foot with excision of bursa and adipose tissue from fifth metatarsal base by Dr. Benitez Kidney      lower back (1992 and 2000)    HX OTHER SURGICAL      left foot (2008)    HX OTHER SURGICAL      Retina repair     LAMINOTOMY      NERVE BLOCK         Social History     Socioeconomic History    Marital status:      Spouse name: Not on file    Number of children: Not on file    Years of education: Not on file    Highest education level: Not on file   Social Needs    Financial resource strain: Not on file    Food insecurity - worry: Not on file    Food insecurity - inability: Not on file   [employer] needs - medical: Not on file   [employer] needs - non-medical: Not on file   Occupational History    Not on file   Tobacco Use    Smoking status: Never Smoker    Smokeless tobacco: Never Used   Substance and Sexual Activity    Alcohol use: No    Drug use: No    Sexual activity: Not Currently   Other Topics Concern    Not on file   Social History Narrative    Not on file       REVIEW OF SYSTEMS:      No changes from previous review of systems unless noted. PHYSICAL EXAMINATION:  Visit Vitals  /86 (BP 1 Location: Left arm, BP Patient Position: Sitting)   Pulse 88   Temp 97.3 °F (36.3 °C) (Oral)   Resp 16   Ht 5' 10\" (1.778 m)   Wt 222 lb (100.7 kg)   SpO2 98%   BMI 31.85 kg/m²     Body mass index is 31.85 kg/m². GENERAL: Alert and oriented x3, in no acute distress. HEENT: Normocephalic, atraumatic. RESP: Non labored breathing. SKIN: No rashes or lesions noted. PHYSICAL EXAMINATION:   Physical exam of the right shoulder with no ecchymosis and no erythema. No effusion. He has forward flexion actively to 160 degrees. He has pain and weakness with supraspinatus and infraspinatus and subscapularis testing. Kole deformity is present. Neurovascularly intact distally. ASSESSMENT/PLAN:   Tyler Irwin is a 70year-old male with massive right rotator cuff tear.   We have known about this for a few months. He had prostate surgery, which he is now done with. He would like to get his shoulder work done. This would be an arthroscopic rotator cuff repair. I would do my best to repair as much as possible. If this was not repairable, he may benefit from something like an augmentation patch, such as a superior capsular reconstruction, although his age makes it less likely to do that. We will get him set up for surgery in the near future. All the risks and benefits were discussed with him.             Electronically signed by: Rhona White MD

## 2019-01-08 NOTE — PROGRESS NOTES
Pt dropped off to h/s location FMLA form, 4 PAGES,for completion     Please contact the patient when done at,    [phone] or [phone]

## 2019-01-09 DIAGNOSIS — D68.9 COAGULOPATHY (HCC): ICD-10-CM

## 2019-01-09 DIAGNOSIS — Z01.818 PRE-OP TESTING: ICD-10-CM

## 2019-01-09 DIAGNOSIS — M75.121 COMPLETE TEAR OF RIGHT ROTATOR CUFF: Primary | ICD-10-CM

## 2019-01-09 DIAGNOSIS — Z01.812 PRE-OPERATIVE LABORATORY EXAMINATION: ICD-10-CM

## 2019-01-09 RX ORDER — WARFARIN SODIUM 5 MG/1
TABLET ORAL
Qty: 75 TAB | Refills: 0 | Status: SHIPPED | OUTPATIENT
Start: 2019-01-09 | End: 2019-01-22 | Stop reason: ALTCHOICE

## 2019-01-09 RX ORDER — NYSTATIN 100000 U/G
CREAM TOPICAL
Qty: 15 G | Refills: 0 | Status: SHIPPED | OUTPATIENT
Start: 2019-01-09 | End: 2019-01-16

## 2019-01-17 ENCOUNTER — DOCUMENTATION ONLY (OUTPATIENT)
Dept: ORTHOPEDIC SURGERY | Facility: CLINIC | Age: 64
End: 2019-01-17

## 2019-01-18 ENCOUNTER — HOSPITAL ENCOUNTER (OUTPATIENT)
Dept: PREADMISSION TESTING | Age: 64
Discharge: HOME | End: 2019-01-18
Payer: MEDICARE

## 2019-01-18 ENCOUNTER — OFFICE VISIT (OUTPATIENT)
Dept: INTERNAL MEDICINE CLINIC | Age: 64
End: 2019-01-18

## 2019-01-18 VITALS
RESPIRATION RATE: 16 BRPM | WEIGHT: 226 LBS | BODY MASS INDEX: 32.35 KG/M2 | TEMPERATURE: 99 F | DIASTOLIC BLOOD PRESSURE: 80 MMHG | SYSTOLIC BLOOD PRESSURE: 120 MMHG | HEART RATE: 66 BPM | OXYGEN SATURATION: 96 % | HEIGHT: 70 IN

## 2019-01-18 DIAGNOSIS — M75.121 COMPLETE TEAR OF RIGHT ROTATOR CUFF: ICD-10-CM

## 2019-01-18 DIAGNOSIS — D68.9 COAGULOPATHY (HCC): ICD-10-CM

## 2019-01-18 DIAGNOSIS — I10 ESSENTIAL HYPERTENSION: Primary | ICD-10-CM

## 2019-01-18 DIAGNOSIS — Z79.01 WARFARIN ANTICOAGULATION: ICD-10-CM

## 2019-01-18 DIAGNOSIS — Z86.718 PERSONAL HISTORY OF VENOUS THROMBOSIS AND EMBOLISM: ICD-10-CM

## 2019-01-18 DIAGNOSIS — Z01.818 PREOP EXAMINATION: ICD-10-CM

## 2019-01-18 DIAGNOSIS — Z01.818 PRE-OP TESTING: ICD-10-CM

## 2019-01-18 DIAGNOSIS — Z01.812 PRE-OPERATIVE LABORATORY EXAMINATION: ICD-10-CM

## 2019-01-18 LAB
ALBUMIN SERPL-MCNC: 3.7 G/DL (ref 3.4–5)
ALBUMIN/GLOB SERPL: 1.4 {RATIO} (ref 0.8–1.7)
ALP SERPL-CCNC: 70 U/L (ref 45–117)
ALT SERPL-CCNC: 26 U/L (ref 16–61)
ANION GAP SERPL CALC-SCNC: 4 MMOL/L (ref 3–18)
APTT PPP: 33.7 SEC (ref 23–36.4)
AST SERPL-CCNC: 19 U/L (ref 15–37)
BASOPHILS # BLD: 0.1 K/UL (ref 0–0.1)
BASOPHILS NFR BLD: 1 % (ref 0–2)
BILIRUB SERPL-MCNC: 0.3 MG/DL (ref 0.2–1)
BUN SERPL-MCNC: 14 MG/DL (ref 7–18)
BUN/CREAT SERPL: 16 (ref 12–20)
CALCIUM SERPL-MCNC: 9.1 MG/DL (ref 8.5–10.1)
CHLORIDE SERPL-SCNC: 105 MMOL/L (ref 100–108)
CO2 SERPL-SCNC: 32 MMOL/L (ref 21–32)
CREAT SERPL-MCNC: 0.9 MG/DL (ref 0.6–1.3)
DIFFERENTIAL METHOD BLD: ABNORMAL
EOSINOPHIL # BLD: 0.1 K/UL (ref 0–0.4)
EOSINOPHIL NFR BLD: 2 % (ref 0–5)
ERYTHROCYTE [DISTWIDTH] IN BLOOD BY AUTOMATED COUNT: 12.6 % (ref 11.6–14.5)
GLOBULIN SER CALC-MCNC: 2.6 G/DL (ref 2–4)
GLUCOSE SERPL-MCNC: 88 MG/DL (ref 74–99)
HCT VFR BLD AUTO: 41.3 % (ref 36–48)
HGB BLD-MCNC: 13.8 G/DL (ref 13–16)
INR BLD: 2.4
INR PPP: 2.3 (ref 0.8–1.2)
LYMPHOCYTES # BLD: 1.4 K/UL (ref 0.9–3.6)
LYMPHOCYTES NFR BLD: 24 % (ref 21–52)
MCH RBC QN AUTO: 31.7 PG (ref 24–34)
MCHC RBC AUTO-ENTMCNC: 33.4 G/DL (ref 31–37)
MCV RBC AUTO: 94.7 FL (ref 74–97)
MONOCYTES # BLD: 0.9 K/UL (ref 0.05–1.2)
MONOCYTES NFR BLD: 15 % (ref 3–10)
NEUTS SEG # BLD: 3.4 K/UL (ref 1.8–8)
NEUTS SEG NFR BLD: 58 % (ref 40–73)
PLATELET # BLD AUTO: 215 K/UL (ref 135–420)
PMV BLD AUTO: 10.5 FL (ref 9.2–11.8)
POTASSIUM SERPL-SCNC: 4.4 MMOL/L (ref 3.5–5.5)
PROT SERPL-MCNC: 6.3 G/DL (ref 6.4–8.2)
PROTHROMBIN TIME: 25.2 SEC (ref 11.5–15.2)
PT POC: NORMAL SECONDS
RBC # BLD AUTO: 4.36 M/UL (ref 4.7–5.5)
SODIUM SERPL-SCNC: 141 MMOL/L (ref 136–145)
VALID INTERNAL CONTROL?: YES
WBC # BLD AUTO: 5.9 K/UL (ref 4.6–13.2)

## 2019-01-18 PROCEDURE — 85025 COMPLETE CBC W/AUTO DIFF WBC: CPT

## 2019-01-18 PROCEDURE — 80053 COMPREHEN METABOLIC PANEL: CPT

## 2019-01-18 PROCEDURE — 85610 PROTHROMBIN TIME: CPT

## 2019-01-18 PROCEDURE — 36415 COLL VENOUS BLD VENIPUNCTURE: CPT

## 2019-01-18 PROCEDURE — 85730 THROMBOPLASTIN TIME PARTIAL: CPT

## 2019-01-18 RX ORDER — ENOXAPARIN SODIUM 100 MG/ML
INJECTION SUBCUTANEOUS
Refills: 0 | Status: CANCELLED | OUTPATIENT
Start: 2019-01-18

## 2019-01-18 RX ORDER — ENOXAPARIN SODIUM 100 MG/ML
INJECTION SUBCUTANEOUS
Qty: 10 SYRINGE | Refills: 0 | Status: SHIPPED | OUTPATIENT
Start: 2019-01-18 | End: 2019-04-12 | Stop reason: ALTCHOICE

## 2019-01-18 NOTE — PROGRESS NOTES
Mr. Javi Lobo is here today for anticoagulation monitoring for the diagnosis of Atrial Fibrillation. His INR goal is 2.0-3.0 and his current Coumadin dose is 10 mg five days a week and 8 mg two days a week . Today's findings include an INR of 2.4 (normal INR range 0.8-1.2) . Considering Mr. Winters's past history, todays findings, and per the coumadin policy/protocol, Mr. Kole Cain was instructed to take Coumadin as follows,  same. He was also instructed to schedule an appointment in 1 weeks prior to leaving for an INR check. A full discussion of the nature of anticoagulants has been carried out. A full discussion of the need for frequent and regular monitoring, precise dosage adjustment and compliance was stressed. Side effects of potential bleeding were discussed and Mr. Kole Cain was instructed to call [phone] if there are any signs of abnormal bleeding. Mr. Kole Cain was instructed to avoid any OTC items containing aspirin or ibuprofen and prior to starting any new OTC products to consult with his physician or pharmacist to ensure no drug interactions are present. Mr. Kole Cain was instructed to avoid any major changes in his general diet and to avoid alcohol consumption. .        Mr. Kole Cain verbalized his understanding of all instructions and will call the office with any questions, concerns, or signs of abnormal bleeding or blood clot.

## 2019-01-18 NOTE — PROGRESS NOTES
Chief Complaint   Patient presents with    Pre-op Exam       Pt preferred language for health care discussion is english. Is someone accompanying this pt? no    Is the patient using any DME equipment during OV? no    Depression Screening:  PHQ over the last two weeks 1/8/2019 10/23/2018 10/19/2018 10/16/2018 10/8/2018 8/30/2018 8/24/2018   PHQ Not Done - - Patient Decline - - - -   Little interest or pleasure in doing things Not at all Not at all - Not at all Not at all Several days Not at all   Feeling down, depressed, irritable, or hopeless Not at all Not at all - Not at all Not at all Not at all Not at all   Total Score PHQ 2 0 0 - 0 0 1 0       Learning Assessment:  Learning Assessment 11/6/2018 9/25/2015   PRIMARY LEARNER Patient Patient   HIGHEST LEVEL OF EDUCATION - PRIMARY LEARNER  GRADUATED HIGH SCHOOL OR GED GRADUATED HIGH SCHOOL OR GED   BARRIERS PRIMARY LEARNER [address] [facility] CAREGIVER No No   PRIMARY LANGUAGE ENGLISH ENGLISH    NEED - No   LEARNER PREFERENCE PRIMARY DEMONSTRATION DEMONSTRATION   ANSWERED BY patient patient   RELATIONSHIP SELF SELF         Health Maintenance reviewed and discussed per provider. Yes        Advance Directive:  1. Do you have an advance directive in place? Patient Reply:no     2. If not, would you like material regarding how to put one in place? Patient Reply: no    Coordination of Care:  1. Have you been to the ER, urgent care clinic since your last visit? Hospitalized since your last visit? no    2. Have you seen or consulted any other health care providers outside of the [address] [place] [address] since your last visit? Include any pap smears or colon screening.  no

## 2019-01-18 NOTE — PROGRESS NOTES
The patient presents to the office today with the chief complaint of right shoulder pain and for a pre op evaluation    HPI    Rena Balbuena complains of right shoulder pain referable to his right rotator cuff. he is now scheduled for surgical correction. Other than his shoulder the patient has no complaints. The patient denies chest pain or dyspnea. The patient remains on Coumadin from chronic DVT and a hypercoagulable state    Review of Systems   Respiratory: Negative for shortness of breath. Cardiovascular: Negative for chest pain and leg swelling. Musculoskeletal: Positive for joint pain. Allergies   Allergen Reactions    Amoxicillin Itching    Augmentin [Amoxicillin-Pot Clavulanate] Itching    Hibiclens [Chlorhexidine Gluconate] Itching    Milk Containing Products Diarrhea    Penicillins Rash    Requip [Ropinirole] Nausea and Vomiting       Current Outpatient Medications   Medication Sig Dispense Refill    enoxaparin (LOVENOX) 80 mg/0.8 mL injection Per instructions given to the patient to use around the time of surgery 10 Syringe 0    butalbital-acetaminophen-caff (FIORICET) -40 mg per capsule Take 2 capsules every 8 hours as needed for headache 126 Cap 1    lidocaine (LIDODERM) 5 % Apply patch to the affected area for 12 hours a day and remove for 12 hours a day. 30 Each 0    celecoxib (CELEBREX) 200 mg capsule TAKE 1 CAPSULE BY MOUTH TWICE DAILY 180 Cap 0    lisinopril-hydroCHLOROthiazide (PRINZIDE, ZESTORETIC) 20-12.5 mg per tablet TAKE 1 TABLET BY MOUTH DAILY 90 Tab 0    diclofenac (VOLTAREN) 1 % gel Apply 4 g to affected area four (4) times daily. Maximum 16 grams per joint per day.  Dispense 5 100 gram tubes 5 Each 0    raNITIdine (ZANTAC) 150 mg tablet TK 1 T PO HS  1    labetalol (NORMODYNE) 100 mg tablet TAKE ONE TABLET BY MOUTH TWICE DAILY 180 Tab 0    ALPRAZolam (XANAX) 0.5 mg tablet Take one half(1/2) tab to one(1) tab by mouth at bedtime as needed for sleep 30 Tab 0    lansoprazole (PREVACID) 30 mg capsule TAKE 1 CAPSULE DAILY BEFOREBREAKFAST 90 Cap 3    warfarin (COUMADIN) 5 mg tablet TAKE 2 AND 1/2 TABLETS BY MOUTH DAILY OR AS DIRECTED 75 Tab 0    warfarin (COUMADIN) 3 mg tablet Or as directed 30 Tab 3    metaxalone (SKELAXIN) 800 mg tablet Take 1 Tab by mouth three (3) times daily. Indications: Muscle Spasm (Patient taking differently: Take 800 mg by mouth three (3) times daily as needed. Indications: Muscle Spasm) 90 Tab 2    montelukast (SINGULAIR) 10 mg tablet TAKE 1 TABLET BY MOUTH EVERY DAY (Patient taking differently: TAKE 1 TABLET BY MOUTH EVERY HS) 90 Tab 0    acetaminophen (TYLENOL) 500 mg tablet Take 1,000 mg by mouth every six (6) hours as needed for Pain.          Past Medical History:   Diagnosis Date    Arthritis     Bleeding     Chronic pain     knee and shoulder    GERD (gastroesophageal reflux disease)     Headache(784.0)     migraine    High cholesterol     Hypertension     Lower back pain 11/6/2010    Other chest pain     Pure hypercholesterolemia     Right buttock pain 11/6/2010    Right foot pain     Rotator cuff tear     left-since 2010, worsened tear Young.    Rotator cuff tear, right     Spinal stenosis     Tendonitis, tibialis     anterior    Thromboembolus ([address]     3 after sx last one 2000       Past Surgical History:   Procedure Laterality Date    FOOT/TOES SURGERY PROC UNLISTED      HX BACK SURGERY      HX HEENT Right 09/2018    eye surgery    HX KNEE REPLACEMENT Left     HX ORTHOPAEDIC  06-25-12    Right foot with excision of bursa and adipose tissue from fifth metatarsal base by Dr. Edwin Campbell      lower back (1992 and 2000)    HX OTHER SURGICAL      left foot (2008)    HX OTHER SURGICAL      Retina repair     HX PROSTATECTOMY      LAMINOTOMY      NERVE BLOCK         Social History     Socioeconomic History    Marital status:      Spouse name: Not on file    Number of children: Not on file    Years of education: Not on file    Highest education level: Not on file   Social Needs    Financial resource strain: Not on file    Food insecurity - worry: Not on file    Food insecurity - inability: Not on file    Transportation needs - medical: Not on file   [employer] needs - non-medical: Not on file   Occupational History    Not on file   Tobacco Use    Smoking status: Never Smoker    Smokeless tobacco: Never Used   Substance and Sexual Activity    Alcohol use: No    Drug use: No    Sexual activity: Not Currently   Other Topics Concern    Not on file   Social History Narrative    Not on file       Patient does not have an advanced directive on file    Visit Vitals  /80 (BP 1 Location: Left arm, BP Patient Position: Sitting)   Pulse 66   Temp 99 °F (37.2 °C) (Tympanic)   Resp 16   Ht 5' 10\" (1.778 m)   Wt 226 lb (102.5 kg)   SpO2 96%   BMI 32.43 kg/m²       Physical Exam   No Cervical Lymphadenopathy  No Supraclavicular Lymphadenopathy  Thyroid is Normal  Lungs are normal to percussion. Clear to auscultation   Heart:  S1 S2 are normal, No gallops, No murmurs  No Carotid Bruits  Abdomen:  Normal Bowel Sounds. No tenderness. No masses. No Hepatomegaly or Splenomegaly  LE:  Strong Pedal Pulses.   No Edema      BMI:  BMI is high but it was not addressed during this visit due to up coming surgery      Hospital Outpatient Visit on 01/18/2019   Component Date Value Ref Range Status    Sodium 01/18/2019 141  136 - 145 mmol/L Final    Potassium 01/18/2019 4.4  3.5 - 5.5 mmol/L Final    Chloride 01/18/2019 105  100 - 108 mmol/L Final    CO2 01/18/2019 32  21 - 32 mmol/L Final    Anion gap 01/18/2019 4  3.0 - 18 mmol/L Final    Glucose 01/18/2019 88  74 - 99 mg/dL Final    BUN 01/18/2019 14  7.0 - 18 MG/DL Final    Creatinine 01/18/2019 0.90  0.6 - 1.3 MG/DL Final    BUN/Creatinine ratio 01/18/2019 16  12 - 20   Final    GFR est AA 01/18/2019 >60  >60 ml/min/1.73m2 Final  GFR est non-AA 01/18/2019 >60  >60 ml/min/1.73m2 Final    Comment: (NOTE)  Estimated GFR is calculated using the Modification of Diet in Renal   Disease (MDRD) Study equation, reported for both  Americans   (GFRAA) and non- Americans (GFRNA), and normalized to 1.73m2   body surface area. The physician must decide which value applies to   the patient. The MDRD study equation should only be used in   individuals age 25 or older. It has not been validated for the   following: pregnant women, patients with serious comorbid conditions,   or on certain medications, or persons with extremes of body size,   muscle mass, or nutritional status.  Calcium 01/18/2019 9.1  8.5 - 10.1 MG/DL Final    Bilirubin, total 01/18/2019 0.3  0.2 - 1.0 MG/DL Final    ALT (SGPT) 01/18/2019 26  16 - 61 U/L Final    AST (SGOT) 01/18/2019 19  15 - 37 U/L Final    Alk. phosphatase 01/18/2019 70  45 - 117 U/L Final    Protein, total 01/18/2019 6.3* 6.4 - 8.2 g/dL Final    Albumin 01/18/2019 3.7  3.4 - 5.0 g/dL Final    Globulin 01/18/2019 2.6  2.0 - 4.0 g/dL Final    A-G Ratio 01/18/2019 1.4  0.8 - 1.7   Final    WBC 01/18/2019 5.9  4.6 - 13.2 K/uL Final    RBC 01/18/2019 4.36* 4.70 - 5.50 M/uL Final    HGB 01/18/2019 13.8  13.0 - 16.0 g/dL Final    HCT 01/18/2019 41.3  36.0 - 48.0 % Final    MCV 01/18/2019 94.7  74.0 - 97.0 FL Final    MCH 01/18/2019 31.7  24.0 - 34.0 PG Final    MCHC 01/18/2019 33.4  31.0 - 37.0 g/dL Final    RDW 01/18/2019 12.6  11.6 - 14.5 % Final    PLATELET 58/31/3815 850  135 - 420 K/uL Final    MPV 01/18/2019 10.5  9.2 - 11.8 FL Final    NEUTROPHILS 01/18/2019 58  40 - 73 % Final    LYMPHOCYTES 01/18/2019 24  21 - 52 % Final    MONOCYTES 01/18/2019 15* 3 - 10 % Final    EOSINOPHILS 01/18/2019 2  0 - 5 % Final    BASOPHILS 01/18/2019 1  0 - 2 % Final    ABS. NEUTROPHILS 01/18/2019 3.4  1.8 - 8.0 K/UL Final    ABS. LYMPHOCYTES 01/18/2019 1.4  0.9 - 3.6 K/UL Final    ABS. MONOCYTES 01/18/2019 0.9  0.05 - 1.2 K/UL Final    ABS. EOSINOPHILS 01/18/2019 0.1  0.0 - 0.4 K/UL Final    ABS.  BASOPHILS 01/18/2019 0.1  0.0 - 0.1 K/UL Final    DF 01/18/2019 AUTOMATED    Final    aPTT 01/18/2019 33.7  23.0 - 36.4 SEC Final    Prothrombin time 01/18/2019 25.2* 11.5 - 15.2 sec Final    INR 01/18/2019 2.3* 0.8 - 1.2   Final    Comment:            INR Therapeutic Ranges         (on stable oral anticoagulant):     INDICATION                INR  DVT/PE/Atrial Fib          2.0-3.0  MI/Mechanical Heart Valve  2.5-3.5     Office Visit on 01/18/2019   Component Date Value Ref Range Status    VALID INTERNAL CONTROL POC 01/18/2019 Yes   Final    INR POC 01/18/2019 2.4   Final   Office Visit on 12/20/2018   Component Date Value Ref Range Status    Glucose (UA POC) 12/20/2018 Negative  Negative Final    Bilirubin (UA POC) 12/20/2018 Negative  Negative Final    Ketones (UA POC) 12/20/2018 Negative  Negative Final    Specific gravity (UA POC) 12/20/2018 1.020  1.001 - 1.035 Final    Blood (UA POC) 12/20/2018 Trace  Negative Final    pH (UA POC) 12/20/2018 6.0  4.6 - 8.0 Final    Protein (UA POC) 12/20/2018 Negative  Negative Final    Urobilinogen (UA POC) 12/20/2018 0.2 mg/dL  0.2 - 1 Final    Nitrites (UA POC) 12/20/2018 Negative  Negative Final    Leukocyte esterase (UA POC) 12/20/2018 Negative  Negative Final    Prostate Specific Ag 12/20/2018 <0.03   0 - 4 ng/mL Final    Comment: (Methodology: Roche ECLIA)         Admission on 11/14/2018, Discharged on 11/15/2018   Component Date Value Ref Range Status    Prothrombin time 11/14/2018 14.0  11.5 - 15.2 sec Final    INR 11/14/2018 1.1  0.8 - 1.2   Final    Comment:            INR Therapeutic Ranges         (on stable oral anticoagulant):     INDICATION                INR  DVT/PE/Atrial Fib          2.0-3.0  MI/Mechanical Heart Valve  2.5-3.5      Prothrombin time 11/15/2018 14.8  11.5 - 15.2 sec Final    INR 11/15/2018 1.2  0.8 - 1.2 Final    Comment:            INR Therapeutic Ranges         (on stable oral anticoagulant):     INDICATION                INR  DVT/PE/Atrial Fib          2.0-3.0  MI/Mechanical Heart Valve  2.5-3.5      WBC 11/15/2018 11.4  4.6 - 13.2 K/uL Final    RBC 11/15/2018 4.03* 4.70 - 5.50 M/uL Final    HGB 11/15/2018 13.2  13.0 - 16.0 g/dL Final    HCT 11/15/2018 39.0  36.0 - 48.0 % Final    MCV 11/15/2018 96.8  74.0 - 97.0 FL Final    MCH 11/15/2018 32.8  24.0 - 34.0 PG Final    MCHC 11/15/2018 33.8  31.0 - 37.0 g/dL Final    RDW 11/15/2018 13.0  11.6 - 14.5 % Final    PLATELET 66/07/4726 833  135 - 420 K/uL Final    MPV 11/15/2018 11.3  9.2 - 11.8 FL Final    Sodium 11/15/2018 139  136 - 145 mmol/L Final    Potassium 11/15/2018 3.4* 3.5 - 5.5 mmol/L Final    Chloride 11/15/2018 102  100 - 108 mmol/L Final    CO2 11/15/2018 27  21 - 32 mmol/L Final    Anion gap 11/15/2018 10  3.0 - 18 mmol/L Final    Glucose 11/15/2018 103* 74 - 99 mg/dL Final    BUN 11/15/2018 12  7.0 - 18 MG/DL Final    Creatinine 11/15/2018 0.75  0.6 - 1.3 MG/DL Final    BUN/Creatinine ratio 11/15/2018 16  12 - 20   Final    GFR est AA 11/15/2018 >60  >60 ml/min/1.73m2 Final    GFR est non-AA 11/15/2018 >60  >60 ml/min/1.73m2 Final    Comment: (NOTE)  Estimated GFR is calculated using the Modification of Diet in Renal   Disease (MDRD) Study equation, reported for both  Americans   (GFRAA) and non- Americans (GFRNA), and normalized to 1.73m2   body surface area. The physician must decide which value applies to   the patient. The MDRD study equation should only be used in   individuals age 25 or older. It has not been validated for the   following: pregnant women, patients with serious comorbid conditions,   or on certain medications, or persons with extremes of body size,   muscle mass, or nutritional status.       Calcium 11/15/2018 8.4* 8.5 - 10.1 MG/DL Final   Admission on 11/14/2018, Discharged on 11/14/2018 Component Date Value Ref Range Status    WBC 11/14/2018 11.4  4.6 - 13.2 K/uL Final    RBC 11/14/2018 4.20* 4.70 - 5.50 M/uL Final    HGB 11/14/2018 13.5  13.0 - 16.0 g/dL Final    HCT 11/14/2018 40.3  36.0 - 48.0 % Final    MCV 11/14/2018 96.0  74.0 - 97.0 FL Final    MCH 11/14/2018 32.1  24.0 - 34.0 PG Final    MCHC 11/14/2018 33.5  31.0 - 37.0 g/dL Final    RDW 11/14/2018 12.8  11.6 - 14.5 % Final    PLATELET 90/12/2513 056  135 - 420 K/uL Final    MPV 11/14/2018 10.5  9.2 - 11.8 FL Final    NEUTROPHILS 11/14/2018 81* 40 - 73 % Final    LYMPHOCYTES 11/14/2018 8* 21 - 52 % Final    MONOCYTES 11/14/2018 11* 3 - 10 % Final    EOSINOPHILS 11/14/2018 0  0 - 5 % Final    BASOPHILS 11/14/2018 0  0 - 2 % Final    ABS. NEUTROPHILS 11/14/2018 9.2* 1.8 - 8.0 K/UL Final    ABS. LYMPHOCYTES 11/14/2018 0.9  0.9 - 3.6 K/UL Final    ABS. MONOCYTES 11/14/2018 1.3* 0.05 - 1.2 K/UL Final    ABS. EOSINOPHILS 11/14/2018 0.0  0.0 - 0.4 K/UL Final    ABS. BASOPHILS 11/14/2018 0.0  0.0 - 0.1 K/UL Final    DF 11/14/2018 AUTOMATED    Final    Sodium 11/14/2018 135* 136 - 145 mmol/L Final    Potassium 11/14/2018 3.6  3.5 - 5.5 mmol/L Final    Chloride 11/14/2018 99* 100 - 108 mmol/L Final    CO2 11/14/2018 28  21 - 32 mmol/L Final    Anion gap 11/14/2018 8  3.0 - 18 mmol/L Final    Glucose 11/14/2018 125* 74 - 99 mg/dL Final    BUN 11/14/2018 11  7.0 - 18 MG/DL Final    Creatinine 11/14/2018 0.90  0.6 - 1.3 MG/DL Final    BUN/Creatinine ratio 11/14/2018 12  12 - 20   Final    GFR est AA 11/14/2018 >60  >60 ml/min/1.73m2 Final    GFR est non-AA 11/14/2018 >60  >60 ml/min/1.73m2 Final    Comment: (NOTE)  Estimated GFR is calculated using the Modification of Diet in Renal   Disease (MDRD) Study equation, reported for both  Americans   (GFRAA) and non- Americans (GFRNA), and normalized to 1.73m2   body surface area. The physician must decide which value applies to   the patient.  The MDRD study equation should only be used in   individuals age 25 or older. It has not been validated for the   following: pregnant women, patients with serious comorbid conditions,   or on certain medications, or persons with extremes of body size,   muscle mass, or nutritional status.  Calcium 11/14/2018 8.9  8.5 - 10.1 MG/DL Final    Bilirubin, total 11/14/2018 0.6  0.2 - 1.0 MG/DL Final    ALT (SGPT) 11/14/2018 26  16 - 61 U/L Final    AST (SGOT) 11/14/2018 20  15 - 37 U/L Final    Alk.  phosphatase 11/14/2018 52  45 - 117 U/L Final    Protein, total 11/14/2018 6.7  6.4 - 8.2 g/dL Final    Albumin 11/14/2018 3.4  3.4 - 5.0 g/dL Final    Globulin 11/14/2018 3.3  2.0 - 4.0 g/dL Final    A-G Ratio 11/14/2018 1.0  0.8 - 1.7   Final    Color 11/14/2018 YELLOW    Final    Appearance 11/14/2018 CLEAR    Final    Specific gravity 11/14/2018 1.019  1.005 - 1.030   Final    pH (UA) 11/14/2018 6.5  5.0 - 8.0   Final    Protein 11/14/2018 NEGATIVE   NEG mg/dL Final    Glucose 11/14/2018 NEGATIVE   NEG mg/dL Final    Ketone 11/14/2018 NEGATIVE   NEG mg/dL Final    Bilirubin 11/14/2018 NEGATIVE   NEG   Final    Blood 11/14/2018 MODERATE* NEG   Final    Urobilinogen 11/14/2018 0.2  0.2 - 1.0 EU/dL Final    Nitrites 11/14/2018 NEGATIVE   NEG   Final    Leukocyte Esterase 11/14/2018 SMALL* NEG   Final    Lactic Acid (POC) 11/14/2018 0.78  0.40 - 2.00 mmol/L Final    WBC 11/14/2018 0 to 3  0 - 4 /hpf Final    RBC 11/14/2018 11 to 20  0 - 5 /hpf Final    Epithelial cells 11/14/2018 FEW  0 - 5 /lpf Final   Admission on 11/12/2018, Discharged on 11/13/2018   Component Date Value Ref Range Status    Crossmatch Expiration 11/12/2018 11/15/2018   Final    ABO/Rh(D) 11/12/2018 B POSITIVE   Final    Antibody screen 11/12/2018 NEG   Final    Prothrombin time 11/12/2018 13.8  11.5 - 15.2 sec Final    INR 11/12/2018 1.1  0.8 - 1.2   Final    Comment:            INR Therapeutic Ranges         (on stable oral anticoagulant):     INDICATION                INR  DVT/PE/Atrial Fib          2.0-3.0  MI/Mechanical Heart Valve  2.5-3.5      Sodium 11/13/2018 140  136 - 145 mmol/L Final    Potassium 11/13/2018 4.1  3.5 - 5.5 mmol/L Final    Chloride 11/13/2018 104  100 - 108 mmol/L Final    CO2 11/13/2018 26  21 - 32 mmol/L Final    Anion gap 11/13/2018 10  3.0 - 18 mmol/L Final    Glucose 11/13/2018 105* 74 - 99 mg/dL Final    BUN 11/13/2018 19* 7.0 - 18 MG/DL Final    Creatinine 11/13/2018 0.94  0.6 - 1.3 MG/DL Final    BUN/Creatinine ratio 11/13/2018 20  12 - 20   Final    GFR est AA 11/13/2018 >60  >60 ml/min/1.73m2 Final    GFR est non-AA 11/13/2018 >60  >60 ml/min/1.73m2 Final    Comment: (NOTE)  Estimated GFR is calculated using the Modification of Diet in Renal   Disease (MDRD) Study equation, reported for both  Americans   (GFRAA) and non- Americans (GFRNA), and normalized to 1.73m2   body surface area. The physician must decide which value applies to   the patient. The MDRD study equation should only be used in   individuals age 25 or older. It has not been validated for the   following: pregnant women, patients with serious comorbid conditions,   or on certain medications, or persons with extremes of body size,   muscle mass, or nutritional status.       Calcium 11/13/2018 8.4* 8.5 - 10.1 MG/DL Final    WBC 11/13/2018 9.0  4.6 - 13.2 K/uL Final    RBC 11/13/2018 3.74* 4.70 - 5.50 M/uL Final    HGB 11/13/2018 11.9* 13.0 - 16.0 g/dL Final    HCT 11/13/2018 36.4  36.0 - 48.0 % Final    MCV 11/13/2018 97.3* 74.0 - 97.0 FL Final    MCH 11/13/2018 31.8  24.0 - 34.0 PG Final    MCHC 11/13/2018 32.7  31.0 - 37.0 g/dL Final    RDW 11/13/2018 13.4  11.6 - 14.5 % Final    PLATELET 36/90/0645 908  135 - 420 K/uL Final    MPV 11/13/2018 11.3  9.2 - 11.8 [facility] Final   Hospital Outpatient Visit on 11/03/2018   Component Date Value Ref Range Status    Ventricular Rate 11/03/2018 76  BPM Final    Atrial Rate 11/03/2018 76  BPM Final    P-R Interval 11/03/2018 180  ms Final    QRS Duration 11/03/2018 94  ms Final    Q-T Interval 11/03/2018 366  ms Final    QTC Calculation (Bezet) 11/03/2018 411  ms Final    Calculated P Axis 11/03/2018 53  degrees Final    Calculated R Axis 11/03/2018 5  degrees Final    Calculated T Axis 11/03/2018 63  degrees Final    Diagnosis 11/03/2018    Final                    Value:Normal sinus rhythm  Possible Left atrial enlargement  Inferior infarct , age undetermined  Abnormal ECG  When compared with ECG of 31-JAN-2018 11:29,  No significant change was found  Confirmed by Candia Dance ([id number]) on 11/4/2018 9:40:19 PM      WBC 11/03/2018 4.7  4.6 - 13.2 K/uL Final    RBC 11/03/2018 4.13* 4.70 - 5.50 M/uL Final    HGB 11/03/2018 13.1  13.0 - 16.0 g/dL Final    HCT 11/03/2018 39.0  36.0 - 48.0 % Final    MCV 11/03/2018 94.4  74.0 - 97.0 FL Final    MCH 11/03/2018 31.7  24.0 - 34.0 PG Final    MCHC 11/03/2018 33.6  31.0 - 37.0 g/dL Final    RDW 11/03/2018 13.3  11.6 - 14.5 % Final    PLATELET 27/82/9761 812  135 - 420 K/uL Final    MPV 11/03/2018 10.8  9.2 - 11.8 FL Final    Sodium 11/03/2018 141  136 - 145 mmol/L Final    Potassium 11/03/2018 3.9  3.5 - 5.5 mmol/L Final    Chloride 11/03/2018 107  100 - 108 mmol/L Final    CO2 11/03/2018 29  21 - 32 mmol/L Final    Anion gap 11/03/2018 5  3.0 - 18 mmol/L Final    Glucose 11/03/2018 115* 74 - 99 mg/dL Final    BUN 11/03/2018 12  7.0 - 18 MG/DL Final    Creatinine 11/03/2018 0.91  0.6 - 1.3 MG/DL Final    BUN/Creatinine ratio 11/03/2018 13  12 - 20   Final    GFR est AA 11/03/2018 >60  >60 ml/min/1.73m2 Final    GFR est non-AA 11/03/2018 >60  >60 ml/min/1.73m2 Final    Comment: (NOTE)  Estimated GFR is calculated using the Modification of Diet in Renal   Disease (MDRD) Study equation, reported for both  Americans   (GFRAA) and non- Americans (GFRNA), and normalized to 1.73m2   body surface area. The physician must decide which value applies to   the patient. The MDRD study equation should only be used in   individuals age 25 or older. It has not been validated for the   following: pregnant women, patients with serious comorbid conditions,   or on certain medications, or persons with extremes of body size,   muscle mass, or nutritional status.  Calcium 11/03/2018 8.4* 8.5 - 10.1 MG/DL Final    Color 11/03/2018 YELLOW    Final    Appearance 11/03/2018 CLEAR    Final    Specific gravity 11/03/2018 1.013  1.005 - 1.030   Final    pH (UA) 11/03/2018 6.0  5.0 - 8.0   Final    Protein 11/03/2018 NEGATIVE   NEG mg/dL Final    Glucose 11/03/2018 NEGATIVE   NEG mg/dL Final    Ketone 11/03/2018 NEGATIVE   NEG mg/dL Final    Bilirubin 11/03/2018 NEGATIVE   NEG   Final    Blood 11/03/2018 NEGATIVE   NEG   Final    Urobilinogen 11/03/2018 0.2  0.2 - 1.0 EU/dL Final    Nitrites 11/03/2018 NEGATIVE   NEG   Final    Leukocyte Esterase 11/03/2018 NEGATIVE   NEG   Final    Special Requests: 11/03/2018 NO SPECIAL REQUESTS    Final    Culture result: 11/03/2018 NO GROWTH 1 DAY    Final       .  Results for orders placed or performed during the hospital encounter of 33/63/67   METABOLIC PANEL, COMPREHENSIVE   Result Value Ref Range    Sodium 141 136 - 145 mmol/L    Potassium 4.4 3.5 - 5.5 mmol/L    Chloride 105 100 - 108 mmol/L    CO2 32 21 - 32 mmol/L    Anion gap 4 3.0 - 18 mmol/L    Glucose 88 74 - 99 mg/dL    BUN 14 7.0 - 18 MG/DL    Creatinine 0.90 0.6 - 1.3 MG/DL    BUN/Creatinine ratio 16 12 - 20      GFR est AA >60 >60 ml/min/1.73m2    GFR est non-AA >60 >60 ml/min/1.73m2    Calcium 9.1 8.5 - 10.1 MG/DL    Bilirubin, total 0.3 0.2 - 1.0 MG/DL    ALT (SGPT) 26 16 - 61 U/L    AST (SGOT) 19 15 - 37 U/L    Alk.  phosphatase 70 45 - 117 U/L    Protein, total 6.3 (L) 6.4 - 8.2 g/dL    Albumin 3.7 3.4 - 5.0 g/dL    Globulin 2.6 2.0 - 4.0 g/dL    A-G Ratio 1.4 0.8 - 1.7     CBC WITH AUTOMATED DIFF Result Value Ref Range    WBC 5.9 4.6 - 13.2 K/uL    RBC 4.36 (L) 4.70 - 5.50 M/uL    HGB 13.8 13.0 - 16.0 g/dL    HCT 41.3 36.0 - 48.0 %    MCV 94.7 74.0 - 97.0 FL    MCH 31.7 24.0 - 34.0 PG    MCHC 33.4 31.0 - 37.0 g/dL    RDW 12.6 11.6 - 14.5 %    PLATELET 529 668 - 904 K/uL    MPV 10.5 9.2 - 11.8 FL    NEUTROPHILS 58 40 - 73 %    LYMPHOCYTES 24 21 - 52 %    MONOCYTES 15 (H) 3 - 10 %    EOSINOPHILS 2 0 - 5 %    BASOPHILS 1 0 - 2 %    ABS. NEUTROPHILS 3.4 1.8 - 8.0 K/UL    ABS. LYMPHOCYTES 1.4 0.9 - 3.6 K/UL    ABS. MONOCYTES 0.9 0.05 - 1.2 K/UL    ABS. EOSINOPHILS 0.1 0.0 - 0.4 K/UL    ABS. BASOPHILS 0.1 0.0 - 0.1 K/UL    DF AUTOMATED     PTT   Result Value Ref Range    aPTT 33.7 23.0 - 36.4 SEC   PROTHROMBIN TIME + INR   Result Value Ref Range    Prothrombin time 25.2 (H) 11.5 - 15.2 sec    INR 2.3 (H) 0.8 - 1.2     Results for orders placed or performed in visit on 01/18/19   AMB POC PT/INR   Result Value Ref Range    VALID INTERNAL CONTROL POC Yes     Prothrombin time (POC)  seconds    INR POC 2.4    AMB POC EKG ROUTINE W/ 12 LEADS, INTER & REP    Impression    Left Atrial Enlargement. 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  EKG is unchanged from 1/31/18       Pre op testing:         Labs:   CBC, CMP are normal                   Protime is elevated and PTT is upper limit of normal due to anticoagulation with Warfarin which will be stopped pre op       EKG:   Left Atrial enlargement with possible old inferior wall MI. Unchanged from 1/31/18    Assessment / Plan      ICD-10-CM ICD-9-CM    1. Essential hypertension I10 401.9 AMB POC EKG ROUTINE W/ 12 LEADS, INTER & REP   2. Preop examination Z01.818 V72.84 AMB POC EKG ROUTINE W/ 12 LEADS, INTER & REP   3. Personal history of venous thrombosis and embolism Z86.718 V12.51 AMB POC PT/INR   4. Warfarin anticoagulation Z79.01 V58.61        THE PATIENT IS OK FOR SURGERY    Last dose of Coumadiin will be Tuesday night 1/22/19. Coumadin will resume Monday 1/28/19 unless there is excessive bleeding at surgery  Bridging will be done with Lovenox 80 mg -- BID on 1/25 and 1/26/19. Sunday AM 1/27/19. Lovenox BID 1/29 on 1/30 then on Coumadin alone  Continue Lisinopril and Labetalol    Follow-up Disposition:  Return in about 3 months (around 4/18/2019). I asked Kennedy Bolanos if he has any questions and I answered the questions.   Kennedy Bolanos states that he understands the treatment plan and agrees with the treatment plan

## 2019-01-22 ENCOUNTER — TELEPHONE (OUTPATIENT)
Dept: INTERNAL MEDICINE CLINIC | Age: 64
End: 2019-01-22

## 2019-01-22 NOTE — TELEPHONE ENCOUNTER
Dr Gladis Aase has been advised and has spoken with Dr Ramesh Burns office and patient called and advised he should stop the warfarin tonight

## 2019-01-22 NOTE — TELEPHONE ENCOUNTER
Patient called concerned about his INR level and his upcoming surgery on 1/28/19. Dr Luis Lai called him and wanted him to touch base with Dr Davia Felty. Labs came back from 1/18/19 with levels. He is supposed to start Lovenox before surgery. Please advise at [phone].

## 2019-01-23 ENCOUNTER — DOCUMENTATION ONLY (OUTPATIENT)
Dept: ORTHOPEDIC SURGERY | Facility: CLINIC | Age: 64
End: 2019-01-23

## 2019-01-23 RX ORDER — LISINOPRIL AND HYDROCHLOROTHIAZIDE 12.5; 2 MG/1; MG/1
TABLET ORAL
Qty: 90 TAB | Refills: 0 | Status: SHIPPED | OUTPATIENT
Start: 2019-01-23 | End: 2019-04-18 | Stop reason: SDUPTHER

## 2019-01-23 NOTE — PROGRESS NOTES
Patient dropped off to [facility], DEPT OF LABOR form, for corrections on page 2 regarding dates. Form has been placed in the forms accordion at the [facility] office:     Question 1    appears numerical month was  transposed reflects 01/08/19  should reflect 10/08/18. Dates you treated the patient for condition:   again appears numerical month  was transposed reflects 01/08/19  should reflect 10/08/18. Also this  same line needs added dates of  treatment of 10/16/18, 10/19/18,  10/23/18. 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     Call the pt when done he needs it this week  Pt p#[phone]

## 2019-01-27 ENCOUNTER — ANESTHESIA EVENT (OUTPATIENT)
Dept: SURGERY | Age: 64
End: 2019-01-27
Payer: MEDICARE

## 2019-01-28 ENCOUNTER — ANESTHESIA (OUTPATIENT)
Dept: SURGERY | Age: 64
End: 2019-01-28
Payer: MEDICARE

## 2019-01-28 ENCOUNTER — HOSPITAL ENCOUNTER (OUTPATIENT)
Age: 64
Setting detail: OUTPATIENT SURGERY
Discharge: HOME | End: 2019-01-28
Attending: ORTHOPAEDIC SURGERY | Admitting: ORTHOPAEDIC SURGERY
Payer: MEDICARE

## 2019-01-28 VITALS
OXYGEN SATURATION: 93 % | TEMPERATURE: 98 F | HEART RATE: 77 BPM | DIASTOLIC BLOOD PRESSURE: 82 MMHG | BODY MASS INDEX: 31.64 KG/M2 | WEIGHT: 221 LBS | RESPIRATION RATE: 14 BRPM | HEIGHT: 70 IN | SYSTOLIC BLOOD PRESSURE: 141 MMHG

## 2019-01-28 DIAGNOSIS — G89.18 PAIN FOLLOWING SURGERY OR PROCEDURE: Primary | ICD-10-CM

## 2019-01-28 LAB
INR PPP: 1.1 (ref 0.8–1.2)
PROTHROMBIN TIME: 13.5 SEC (ref 11.5–15.2)

## 2019-01-28 PROCEDURE — 74011250636 HC RX REV CODE- 250/636: Performed by: ANESTHESIOLOGY

## 2019-01-28 PROCEDURE — 74011250636 HC RX REV CODE- 250/636: Performed by: NURSE ANESTHETIST, CERTIFIED REGISTERED

## 2019-01-28 PROCEDURE — 77030008683 HC TU ET CUF COVD -A: Performed by: ANESTHESIOLOGY

## 2019-01-28 PROCEDURE — 77030008574 HC TBNG SUC IRR STRY -B: Performed by: ORTHOPAEDIC SURGERY

## 2019-01-28 PROCEDURE — 64415 NJX AA&/STRD BRCH PLXS IMG: CPT | Performed by: ANESTHESIOLOGY

## 2019-01-28 PROCEDURE — 77030002967 HC SUT PDS J&J -B: Performed by: ORTHOPAEDIC SURGERY

## 2019-01-28 PROCEDURE — 77030013079 HC BLNKT BAIR HGGR 3M -A: Performed by: ORTHOPAEDIC SURGERY

## 2019-01-28 PROCEDURE — 76942 ECHO GUIDE FOR BIOPSY: CPT | Performed by: ANESTHESIOLOGY

## 2019-01-28 PROCEDURE — 77030031410 HC IMMOB SHLDR SLNG SUP BREG -B: Performed by: ORTHOPAEDIC SURGERY

## 2019-01-28 PROCEDURE — 76210000000 HC OR PH I REC 2 TO 2.5 HR: Performed by: ORTHOPAEDIC SURGERY

## 2019-01-28 PROCEDURE — 77030002916 HC SUT ETHLN J&J -A: Performed by: ORTHOPAEDIC SURGERY

## 2019-01-28 PROCEDURE — C1713 ANCHOR/SCREW BN/BN,TIS/BN: HCPCS | Performed by: ORTHOPAEDIC SURGERY

## 2019-01-28 PROCEDURE — 74011250636 HC RX REV CODE- 250/636

## 2019-01-28 PROCEDURE — 74011000250 HC RX REV CODE- 250: Performed by: ORTHOPAEDIC SURGERY

## 2019-01-28 PROCEDURE — 77030032497 HC WRP SHLDR WO BGS SOLM -B: Performed by: ORTHOPAEDIC SURGERY

## 2019-01-28 PROCEDURE — 74011250637 HC RX REV CODE- 250/637: Performed by: ANESTHESIOLOGY

## 2019-01-28 PROCEDURE — 77030019605: Performed by: ORTHOPAEDIC SURGERY

## 2019-01-28 PROCEDURE — 74011000250 HC RX REV CODE- 250: Performed by: NURSE ANESTHETIST, CERTIFIED REGISTERED

## 2019-01-28 PROCEDURE — 74011000250 HC RX REV CODE- 250

## 2019-01-28 PROCEDURE — 74011000258 HC RX REV CODE- 258: Performed by: ORTHOPAEDIC SURGERY

## 2019-01-28 PROCEDURE — 76060000037 HC ANESTHESIA 3 TO 3.5 HR: Performed by: ORTHOPAEDIC SURGERY

## 2019-01-28 PROCEDURE — 77030013079 HC BLNKT BAIR HGGR 3M -A: Performed by: ANESTHESIOLOGY

## 2019-01-28 PROCEDURE — 77030019908 HC STETH ESOPH SIMS -A: Performed by: ANESTHESIOLOGY

## 2019-01-28 PROCEDURE — 77030004453 HC BUR SHV STRY -B: Performed by: ORTHOPAEDIC SURGERY

## 2019-01-28 PROCEDURE — C1776 JOINT DEVICE (IMPLANTABLE): HCPCS | Performed by: ORTHOPAEDIC SURGERY

## 2019-01-28 PROCEDURE — 85610 PROTHROMBIN TIME: CPT

## 2019-01-28 PROCEDURE — 77030002922 HC SUT FBRWRE ARTH -B: Performed by: ORTHOPAEDIC SURGERY

## 2019-01-28 PROCEDURE — 76210000022 HC REC RM PH II 1.5 TO 2 HR: Performed by: ORTHOPAEDIC SURGERY

## 2019-01-28 PROCEDURE — 77030017964 HC PRB COAG4 STRY -C: Performed by: ORTHOPAEDIC SURGERY

## 2019-01-28 PROCEDURE — 77030032490 HC SLV COMPR SCD KNE COVD -B: Performed by: ORTHOPAEDIC SURGERY

## 2019-01-28 PROCEDURE — 77030003598 HC NDL MULT/FIRE ARTH -C: Performed by: ORTHOPAEDIC SURGERY

## 2019-01-28 PROCEDURE — 77030010427: Performed by: ORTHOPAEDIC SURGERY

## 2019-01-28 PROCEDURE — 76010000133 HC OR TIME 3 TO 3.5 HR: Performed by: ORTHOPAEDIC SURGERY

## 2019-01-28 PROCEDURE — 77030022036 HC BLD SHV TOMCAT STRY -B: Performed by: ORTHOPAEDIC SURGERY

## 2019-01-28 PROCEDURE — 77030003666 HC NDL SPINAL BD -A: Performed by: ORTHOPAEDIC SURGERY

## 2019-01-28 PROCEDURE — 77030018836 HC SOL IRR NACL ICUM -A: Performed by: ORTHOPAEDIC SURGERY

## 2019-01-28 DEVICE — ANCHOR SUT L14.7MM DIA5.5MM 2 NO2 TIGERTAIL FULL THRD: Type: IMPLANTABLE DEVICE | Site: SHOULDER | Status: FUNCTIONAL

## 2019-01-28 DEVICE — ANCHOR SUTURE BIOCOMP 4.75X19.1 MM SWIVELOCK C: Type: IMPLANTABLE DEVICE | Site: SHOULDER | Status: FUNCTIONAL

## 2019-01-28 RX ORDER — PROMETHAZINE HYDROCHLORIDE 25 MG/ML
12.5 INJECTION, SOLUTION INTRAMUSCULAR; INTRAVENOUS
Status: DISCONTINUED | OUTPATIENT
Start: 2019-01-28 | End: 2019-01-28 | Stop reason: HOSPADM

## 2019-01-28 RX ORDER — ONDANSETRON 2 MG/ML
INJECTION INTRAMUSCULAR; INTRAVENOUS AS NEEDED
Status: DISCONTINUED | OUTPATIENT
Start: 2019-01-28 | End: 2019-01-28 | Stop reason: HOSPADM

## 2019-01-28 RX ORDER — MIDAZOLAM HYDROCHLORIDE 1 MG/ML
INJECTION, SOLUTION INTRAMUSCULAR; INTRAVENOUS
Status: COMPLETED | OUTPATIENT
Start: 2019-01-28 | End: 2019-01-28

## 2019-01-28 RX ORDER — LIDOCAINE HYDROCHLORIDE 10 MG/ML
20 INJECTION, SOLUTION EPIDURAL; INFILTRATION; INTRACAUDAL; PERINEURAL ONCE
Status: CANCELLED | OUTPATIENT
Start: 2019-01-28 | End: 2019-01-28

## 2019-01-28 RX ORDER — FENTANYL CITRATE 50 UG/ML
INJECTION, SOLUTION INTRAMUSCULAR; INTRAVENOUS AS NEEDED
Status: DISCONTINUED | OUTPATIENT
Start: 2019-01-28 | End: 2019-01-28 | Stop reason: HOSPADM

## 2019-01-28 RX ORDER — ONDANSETRON 2 MG/ML
4 INJECTION INTRAMUSCULAR; INTRAVENOUS ONCE
Status: COMPLETED | OUTPATIENT
Start: 2019-01-28 | End: 2019-01-28

## 2019-01-28 RX ORDER — SODIUM CHLORIDE 0.9 % (FLUSH) 0.9 %
5-40 SYRINGE (ML) INJECTION AS NEEDED
Status: DISCONTINUED | OUTPATIENT
Start: 2019-01-28 | End: 2019-01-28 | Stop reason: HOSPADM

## 2019-01-28 RX ORDER — FENTANYL CITRATE 50 UG/ML
100 INJECTION, SOLUTION INTRAMUSCULAR; INTRAVENOUS ONCE
Status: CANCELLED | OUTPATIENT
Start: 2019-01-28 | End: 2019-01-28

## 2019-01-28 RX ORDER — SODIUM CHLORIDE 0.9 % (FLUSH) 0.9 %
5-40 SYRINGE (ML) INJECTION EVERY 8 HOURS
Status: DISCONTINUED | OUTPATIENT
Start: 2019-01-28 | End: 2019-01-28 | Stop reason: HOSPADM

## 2019-01-28 RX ORDER — SUCCINYLCHOLINE CHLORIDE 20 MG/ML
INJECTION INTRAMUSCULAR; INTRAVENOUS AS NEEDED
Status: DISCONTINUED | OUTPATIENT
Start: 2019-01-28 | End: 2019-01-28 | Stop reason: HOSPADM

## 2019-01-28 RX ORDER — SODIUM CHLORIDE, SODIUM LACTATE, POTASSIUM CHLORIDE, CALCIUM CHLORIDE 600; 310; 30; 20 MG/100ML; MG/100ML; MG/100ML; MG/100ML
25 INJECTION, SOLUTION INTRAVENOUS CONTINUOUS
Status: DISCONTINUED | OUTPATIENT
Start: 2019-01-28 | End: 2019-01-28 | Stop reason: HOSPADM

## 2019-01-28 RX ORDER — GLYCOPYRROLATE 0.2 MG/ML
INJECTION INTRAMUSCULAR; INTRAVENOUS AS NEEDED
Status: DISCONTINUED | OUTPATIENT
Start: 2019-01-28 | End: 2019-01-28 | Stop reason: HOSPADM

## 2019-01-28 RX ORDER — PROPOFOL 10 MG/ML
INJECTION, EMULSION INTRAVENOUS AS NEEDED
Status: DISCONTINUED | OUTPATIENT
Start: 2019-01-28 | End: 2019-01-28 | Stop reason: HOSPADM

## 2019-01-28 RX ORDER — FENTANYL CITRATE 50 UG/ML
50 INJECTION, SOLUTION INTRAMUSCULAR; INTRAVENOUS AS NEEDED
Status: DISCONTINUED | OUTPATIENT
Start: 2019-01-28 | End: 2019-01-28 | Stop reason: HOSPADM

## 2019-01-28 RX ORDER — FENTANYL CITRATE 50 UG/ML
INJECTION, SOLUTION INTRAMUSCULAR; INTRAVENOUS
Status: COMPLETED | OUTPATIENT
Start: 2019-01-28 | End: 2019-01-28

## 2019-01-28 RX ORDER — LIDOCAINE HYDROCHLORIDE 20 MG/ML
INJECTION, SOLUTION EPIDURAL; INFILTRATION; INTRACAUDAL; PERINEURAL AS NEEDED
Status: DISCONTINUED | OUTPATIENT
Start: 2019-01-28 | End: 2019-01-28 | Stop reason: HOSPADM

## 2019-01-28 RX ORDER — MIDAZOLAM HYDROCHLORIDE 1 MG/ML
2 INJECTION, SOLUTION INTRAMUSCULAR; INTRAVENOUS ONCE
Status: CANCELLED | OUTPATIENT
Start: 2019-01-28 | End: 2019-01-28

## 2019-01-28 RX ORDER — DEXAMETHASONE SODIUM PHOSPHATE 4 MG/ML
INJECTION, SOLUTION INTRA-ARTICULAR; INTRALESIONAL; INTRAMUSCULAR; INTRAVENOUS; SOFT TISSUE AS NEEDED
Status: DISCONTINUED | OUTPATIENT
Start: 2019-01-28 | End: 2019-01-28 | Stop reason: HOSPADM

## 2019-01-28 RX ORDER — FENTANYL CITRATE 50 UG/ML
100 INJECTION, SOLUTION INTRAMUSCULAR; INTRAVENOUS ONCE
Status: COMPLETED | OUTPATIENT
Start: 2019-01-28 | End: 2019-01-28

## 2019-01-28 RX ORDER — LIDOCAINE HYDROCHLORIDE 10 MG/ML
0.1 INJECTION, SOLUTION EPIDURAL; INFILTRATION; INTRACAUDAL; PERINEURAL AS NEEDED
Status: DISCONTINUED | OUTPATIENT
Start: 2019-01-28 | End: 2019-01-28 | Stop reason: HOSPADM

## 2019-01-28 RX ORDER — HYDROMORPHONE HYDROCHLORIDE 2 MG/ML
0.5 INJECTION, SOLUTION INTRAMUSCULAR; INTRAVENOUS; SUBCUTANEOUS
Status: DISCONTINUED | OUTPATIENT
Start: 2019-01-28 | End: 2019-01-28 | Stop reason: HOSPADM

## 2019-01-28 RX ORDER — MIDAZOLAM HYDROCHLORIDE 1 MG/ML
2 INJECTION, SOLUTION INTRAMUSCULAR; INTRAVENOUS ONCE
Status: COMPLETED | OUTPATIENT
Start: 2019-01-28 | End: 2019-01-28

## 2019-01-28 RX ORDER — OXYCODONE AND ACETAMINOPHEN 5; 325 MG/1; MG/1
1 TABLET ORAL
Status: COMPLETED | OUTPATIENT
Start: 2019-01-28 | End: 2019-01-28

## 2019-01-28 RX ORDER — OXYCODONE AND ACETAMINOPHEN 10; 325 MG/1; MG/1
1-2 TABLET ORAL
Qty: 40 TAB | Refills: 0 | Status: SHIPPED | OUTPATIENT
Start: 2019-01-28 | End: 2019-04-12 | Stop reason: ALTCHOICE

## 2019-01-28 RX ORDER — ROPIVACAINE HYDROCHLORIDE 5 MG/ML
60 INJECTION, SOLUTION EPIDURAL; INFILTRATION; PERINEURAL
Status: CANCELLED | OUTPATIENT
Start: 2019-01-28 | End: 2019-01-28

## 2019-01-28 RX ORDER — ROPIVACAINE HYDROCHLORIDE 5 MG/ML
30 INJECTION, SOLUTION EPIDURAL; INFILTRATION; PERINEURAL
Status: COMPLETED | OUTPATIENT
Start: 2019-01-28 | End: 2019-01-28

## 2019-01-28 RX ADMIN — ROPIVACAINE HYDROCHLORIDE 150 MG: 5 INJECTION, SOLUTION EPIDURAL; INFILTRATION; PERINEURAL at 11:07

## 2019-01-28 RX ADMIN — FENTANYL CITRATE 50 MCG: 50 INJECTION, SOLUTION INTRAMUSCULAR; INTRAVENOUS at 11:30

## 2019-01-28 RX ADMIN — LIDOCAINE HYDROCHLORIDE 100 MG: 20 INJECTION, SOLUTION EPIDURAL; INFILTRATION; INTRACAUDAL; PERINEURAL at 11:30

## 2019-01-28 RX ADMIN — SODIUM CHLORIDE, SODIUM LACTATE, POTASSIUM CHLORIDE, AND CALCIUM CHLORIDE 25 ML/HR: 600; 310; 30; 20 INJECTION, SOLUTION INTRAVENOUS at 10:27

## 2019-01-28 RX ADMIN — FAMOTIDINE: 10 INJECTION INTRAVENOUS at 10:43

## 2019-01-28 RX ADMIN — FENTANYL CITRATE 50 MCG: 50 INJECTION, SOLUTION INTRAMUSCULAR; INTRAVENOUS at 11:58

## 2019-01-28 RX ADMIN — SODIUM CHLORIDE 600 MG: 900 INJECTION, SOLUTION INTRAVENOUS at 11:28

## 2019-01-28 RX ADMIN — GLYCOPYRROLATE 0.4 MG: 0.2 INJECTION INTRAMUSCULAR; INTRAVENOUS at 11:55

## 2019-01-28 RX ADMIN — DEXAMETHASONE SODIUM PHOSPHATE 8 MG: 4 INJECTION, SOLUTION INTRA-ARTICULAR; INTRALESIONAL; INTRAMUSCULAR; INTRAVENOUS; SOFT TISSUE at 11:36

## 2019-01-28 RX ADMIN — FENTANYL CITRATE 100 MCG: 50 INJECTION INTRAMUSCULAR; INTRAVENOUS at 11:02

## 2019-01-28 RX ADMIN — MIDAZOLAM HYDROCHLORIDE 2 MG: 1 INJECTION, SOLUTION INTRAMUSCULAR; INTRAVENOUS at 11:10

## 2019-01-28 RX ADMIN — ONDANSETRON 4 MG: 2 INJECTION INTRAMUSCULAR; INTRAVENOUS at 11:36

## 2019-01-28 RX ADMIN — OXYCODONE AND ACETAMINOPHEN 1 TABLET: 5; 325 TABLET ORAL at 16:34

## 2019-01-28 RX ADMIN — MIDAZOLAM HYDROCHLORIDE 2 MG: 2 INJECTION, SOLUTION INTRAMUSCULAR; INTRAVENOUS at 11:02

## 2019-01-28 RX ADMIN — LIDOCAINE HYDROCHLORIDE 2 ML: 10 INJECTION, SOLUTION EPIDURAL; INFILTRATION; INTRACAUDAL; PERINEURAL at 11:06

## 2019-01-28 RX ADMIN — ONDANSETRON 4 MG: 2 INJECTION INTRAMUSCULAR; INTRAVENOUS at 15:12

## 2019-01-28 RX ADMIN — SUCCINYLCHOLINE CHLORIDE 140 MG: 20 INJECTION INTRAMUSCULAR; INTRAVENOUS at 11:31

## 2019-01-28 RX ADMIN — PROPOFOL 200 MG: 10 INJECTION, EMULSION INTRAVENOUS at 11:30

## 2019-01-28 RX ADMIN — FENTANYL CITRATE 100 MCG: 50 INJECTION, SOLUTION INTRAMUSCULAR; INTRAVENOUS at 11:10

## 2019-01-28 NOTE — DISCHARGE INSTRUCTIONS
Patient Education        Rotator Cuff Repair: What to Expect at [address] cuff repair surgery is done to fix a tear in the rotator cuff. It can also include cleaning the space between the rotator cuff tendons and the shoulder blade. This is called subacromial smoothing. You will feel tired for several days. Your shoulder will be swollen, and you may notice that your skin is a different color near the cut (incision). Your hand and arm may also be swollen. This is normal and will start to get better in a few days. It will be several months before you have complete use of your shoulder and arm. Once you have healed from surgery, you will need to build your strength and the motion of your joint with rehabilitation (rehab) exercises. In time, your shoulder will likely be stronger, less painful, and more flexible than it was before the surgery. This care sheet gives you a general idea about how long it will take for you to recover. But each person recovers at a different pace. Follow the steps below to get better as quickly as possible. How can you care for yourself at home? Activity    · Rest when you feel tired. Getting enough sleep will help you recover. Do not lie flat or sleep on your side. Raise your upper body on two or three pillows, or sleep in a reclining chair.     · Try to walk each day. Start by walking a little more than you did the day before. Bit by bit, increase the amount you walk. Walking boosts blood flow and helps prevent pneumonia and constipation.     · Your arm will be in a sling or other device to prevent it from moving for 4 to 8 weeks. ? Always use the sling when you are walking or standing. ? If you are sitting or lying down, you can loosen the sling, but do not remove it. This lets your elbow straighten without moving the shoulder. You can also support your arm on a pillow. ?  Remove the sling only to do prescribed exercises or to shower.     · You will not have complete use of your affected arm for 3 to 4 months after surgery. ? You can use your affected arm for writing, eating, or drinking, but move it only at the elbow or wrist. Do not use it for anything else except prescribed exercises until the sling has been removed. ? When the sling has been removed, you can do activities that do not involve lifting, pushing, pulling, or carrying. You may not be able to do overhead lifting for 6 to 12 months.     · If you have a desk job, you will probably be able to return to work or your normal routine in 1 to 2 weeks. If you have a more active job, you may be away from work for 3 to 4 months or longer. If you work at a job that involves heavy manual labor, lifting your arms above your head, or the use of heavy tools, you may have to think about making changes to your job.        ·      · Ask your doctor when you can drive again. This may take about 6 weeks or until you are no longer wearing the sling. Diet    · You can eat your normal diet. If your stomach is upset, try bland, low-fat foods like plain rice, broiled chicken, toast, and yogurt. Drink plenty of fluids.     · You may notice that your bowel movements are not regular right after your surgery. This is common. Try to avoid constipation and straining with bowel movements. You may want to take a fiber supplement every day. If you have not had a bowel movement after a couple of days, ask your doctor about taking a mild laxative. Medicines    · Your doctor will tell you if and when you can restart your medicines. He or she will also give you instructions about taking any new medicines.     · If you take blood thinners, such as warfarin (Coumadin), clopidogrel (Plavix), or aspirin, be sure to talk to your doctor. He or she will tell you if and when to start taking those medicines again. Make sure that you understand exactly what your doctor wants you to do.     · Take pain medicines exactly as directed.   ? If the doctor gave you a prescription medicine for pain, take it as prescribed. Use pain medicine when you first notice pain, before it becomes severe. It is easier to prevent pain early than to stop it once it gets bad.  ? If you are not taking a prescription pain medicine, ask your doctor if you can take an over-the-counter medicine.     · If your doctor prescribed antibiotics, take them as directed. Do not stop taking them just because you feel better. You need to take the full course of antibiotics.     · If you think your pain medicine is making you sick to your stomach:  ? Take your medicine after meals (unless your doctor has told you not to). ? Ask your doctor for a different pain medicine. Incision care    Leave dressing in place for 2 days.          ·      ·    Exercise    · Shoulder rehabilitation is a series of exercises you do after your surgery. This helps you get back your shoulder's range of motion and strength. You will work with your doctor and physical therapist to plan this exercise program. Rehab may not start until 6 weeks after the surgery. To get the best results, you need to do the exercises correctly and as often and for as long as your doctor tells you.    Ice    · To reduce swelling and pain, put ice or a cold pack on your shoulder for 10 to 20 minutes at a time. Do this every 1 to 2 hours. Put a thin cloth between the ice and your skin. Follow-up care is a key part of your treatment and safety. Be sure to make and go to all appointments, and call your doctor if you are having problems. It's also a good idea to know your test results and keep a list of the medicines you take. When should you call for help? Call 911 anytime you think you may need emergency care.  For example, call if:    · You passed out (lost consciousness).     · You have severe trouble breathing.     · You have sudden chest pain and shortness of breath, or you cough up blood.    Call your doctor now or seek immediate medical care if:    · You have numbness, tingling, or a bluish color in your fingers or hand.     · You have severe nausea or vomiting.     · You have pain that does not go away after you take pain medicine.     · You have loose stitches, or your incision comes open.     · Your incision bleeds through your first bandage or is still bleeding 3 days after your surgery.     · You have signs of infection, such as:  ? Increased pain, swelling, warmth, or redness. ? Red streaks leading from the incision. ? Pus draining from the incision. ? A fever.    Watch closely for any changes in your health, and be sure to contact your doctor if:    · You do not have a bowel movement after taking a laxative. Where can you learn more? Go to http://rivka-jarrell.info/. Enter A824 in the search box to learn more about \"Rotator Cuff Repair: What to Expect at Home. \"  Current as of: September 20, 2018  Content Version: 11.9  © 6849-6574 [employer]. Care instructions adapted under license by [employer] (which disclaims liability or warranty for this information). If you have questions about a medical condition or this instruction, always ask your healthcare professional. [place] [address] any warranty or liability for your use of this information. DISCHARGE SUMMARY from Nurse    PATIENT INSTRUCTIONS:    After general anesthesia or intravenous sedation, for 24 hours or while taking prescription Narcotics:  · Limit your activities  · Do not drive and operate hazardous machinery  · Do not make important personal or business decisions  · Do  not drink alcoholic beverages  · If you have not urinated within 8 hours after discharge, please contact your surgeon on call.     Report the following to your surgeon:  · Excessive pain, swelling, redness or odor of or around the surgical area  · Temperature over 100.5  · Nausea and vomiting lasting longer than 4 hours or if unable to take medications  · Any signs of decreased circulation or nerve impairment to extremity: change in color, persistent  numbness, tingling, coldness or increase pain  · Any questions    What to do at Home:  Recommended activity: Activity as tolerated and no driving for today and No driving while on analgesics. *  Please give a list of your current medications to your Primary Care Provider. *  Please update this list whenever your medications are discontinued, doses are      changed, or new medications (including over-the-counter products) are added. *  Please carry medication information at all times in case of emergency situations. These are general instructions for a healthy lifestyle:    No smoking/ No tobacco products/ Avoid exposure to second hand smoke  Surgeon General's Warning:  Quitting smoking now greatly reduces serious risk to your health. Obesity, smoking, and sedentary lifestyle greatly increases your risk for illness    A healthy diet, regular physical exercise & weight monitoring are important for maintaining a healthy lifestyle    You may be retaining fluid if you have a history of heart failure or if you experience any of the following symptoms:  Weight gain of 3 pounds or more overnight or 5 pounds in a week, increased swelling in our hands or feet or shortness of breath while lying flat in bed. Please call your doctor as soon as you notice any of these symptoms; do not wait until your next office visit. Recognize signs and symptoms of STROKE:    F-face looks uneven    A-arms unable to move or move unevenly    S-speech slurred or non-existent    T-time-call 911 as soon as signs and symptoms begin-DO NOT go       Back to bed or wait to see if you get better-TIME IS BRAIN. Warning Signs of HEART ATTACK     Call 911 if you have these symptoms:   Chest discomfort.  Most heart attacks involve discomfort in the center of the chest that lasts more than a few minutes, or that goes away and comes back. It can feel like uncomfortable pressure, squeezing, fullness, or pain.  Discomfort in other areas of the upper body. Symptoms can include pain or discomfort in one or both arms, the back, neck, jaw, or stomach.  Shortness of breath with or without chest discomfort.  Other signs may include breaking out in a cold sweat, nausea, or lightheadedness. Don't wait more than five minutes to call 911 - MINUTES MATTER! Fast action can save your life. Calling 911 is almost always the fastest way to get lifesaving treatment. Emergency Medical Services staff can begin treatment when they arrive -- up to an hour sooner than if someone gets to the hospital by car. The discharge information has been reviewed with the patient and spouse. The patient and spouse verbalized understanding. Discharge medications reviewed with the patient and spouse and appropriate educational materials and side effects teaching were provided. ___________________________________________________________________________________________________________________________________    Patient Education      Narcotic-Analgesic/Acetaminophen (Percocet, [address], [facility], Lortab 10/325) - (By mouth)   Why this medicine is used:   Relieves pain. Contact a nurse or doctor right away if you have:  · Extreme weakness, shallow breathing, slow heartbeat  · Severe confusion, lightheadedness, dizziness, fainting  · Yellow skin or eyes, dark urine or pale stools  · Severe constipation, severe stomach pain, nausea, vomiting, loss of appetite  · Sweating or cold, clammy skin     Common side effects:  · Mild constipation, nausea, vomiting  · Sleepiness, tiredness  · Itching, rash  © 2017 [facility] Information is for End User's use only and may not be sold, redistributed or otherwise used for commercial purposes.

## 2019-01-28 NOTE — ANESTHESIA PREPROCEDURE EVALUATION
Anesthetic History   No history of anesthetic complications            Review of Systems / Medical History  Patient summary reviewed and pertinent labs reviewed    Pulmonary  Within defined limits                 Neuro/Psych   Within defined limits           Cardiovascular    Hypertension: well controlled              Exercise tolerance: >4 METS     GI/Hepatic/Renal     GERD: well controlled           Endo/Other        Arthritis and cancer     Other Findings              Physical Exam    Airway  Mallampati: II  TM Distance: 4 - 6 cm  Neck ROM: normal range of motion   Mouth opening: Normal     Cardiovascular    Rhythm: regular  Rate: normal         Dental  No notable dental hx       Pulmonary  Breath sounds clear to auscultation               Abdominal  GI exam deferred       Other Findings            Anesthetic Plan    ASA: 2  Anesthesia type: general and regional - interscalene block          Induction: Intravenous  Anesthetic plan and risks discussed with: Patient

## 2019-01-28 NOTE — INTERVAL H&P NOTE
H&P Update:  Floyd Thorne was seen and examined. History and physical has been reviewed. The patient has been examined.  There have been no significant clinical changes since the completion of the originally dated History and Physical.    Signed By: Krish Cao MD     January 28, 2019 11:04 AM

## 2019-01-28 NOTE — ANESTHESIA PROCEDURE NOTES
Peripheral Block    Start time: 1/28/2019 11:02 AM  End time: 1/28/2019 11:11 AM  Performed by: Henri Ye MD  Authorized by: Henri Ye MD       Pre-procedure: Indications: at surgeon's request and post-op pain management    Preanesthetic Checklist: patient identified, risks and benefits discussed, site marked, timeout performed, anesthesia consent given and patient being monitored    Timeout Time: 11:02          Block Type:   Block Type:   Interscalene  Laterality:  Right  Monitoring:  Standard ASA monitoring, responsive to questions, oxygen, continuous pulse ox, frequent vital sign checks and heart rate  Injection Technique:  Single shot  Procedures: ultrasound guided    Patient Position: supine  Prep: alcohol    Location:  Interscalene  Needle Type:  Ultraplex  Needle Gauge:  22 G  Needle Localization:  Ultrasound guidance    Assessment:  Number of attempts:  1  Injection Assessment:  Incremental injection every 5 mL, negative aspiration for CSF, no paresthesia, ultrasound image on chart, low pressure verified by pressure monitor, no intravascular symptoms, negative aspiration for blood and local visualized surrounding nerve on ultrasound  Patient tolerance:  Patient tolerated the procedure well with no immediate complications

## 2019-01-28 NOTE — PERIOP NOTES
1623Patient complaining of right side rib pain. Dr. Williams Carroll notified. 0- Dr. Williams Carroll at bedside to assess patient. 80- Dr. Williams Carroll gave verbal order of 5-325 percocet for pain.

## 2019-01-28 NOTE — OP NOTES
[address] [place] [address]   OPERATIVE REPORT    Bibi Robledo  MR#: [id number]  : 1955  ACCOUNT #: [de-identified]   DATE OF SERVICE: 2019    PREOPERATIVE DIAGNOSIS:  Massive right rotator cuff tear. POSTOPERATIVE DIAGNOSIS:  Massive right rotator cuff tear. PROCEDURE PERFORMED:  Massive right arthroscopic rotator cuff repair, CPT code 24026. SURGEON:  Doni Guan MD    ASSISTANT:  Letitia Sharpe PA-C    ANESTHESIA:  General with interscalene nerve block. ESTIMATED BLOOD LOSS:  Minimal.    SPECIMENS REMOVED:  None     COMPLICATIONS:  None. IMPLANTS:  Arthrex suture anchors. IV FLUIDS:  One liter of LR. INDICATIONS FOR THE PROCEDURE:  The patient is a 15-year-old male who injured his right shoulder several months ago. An MRI revealed a massive rotator cuff tear. He had to undergo another surgery in the meantime. He has rehabbed his shoulder somewhat, but failed conservative treatment and elected to undergo the procedure as described. DESCRIPTION OF PROCEDURE:  The patient was identified in the preoperative holding area. The right shoulder was marked as the operative extremity after confirmation with the patient. After discussing the risks and benefits of the procedure, informed consent was confirmed. The patient then underwent an interscalene nerve block by anesthesia in the preoperative holding area. He then was taken to the operating room. He was moved from the stretcher to the OR table. General anesthesia was induced and he was intubated. He was brought into the beach chair position. The right arm was prepped and draped in normal sterile fashion. A timeout was performed verifying the correct patient, procedure, and operative extremity with all again. Antibiotics were given prior to skin incision. The surgery commenced with a posterior portal placement. The arthroscope was brought into the glenohumeral joint.   A massive rotator cuff tear involving the subscapularis, supraspinatus and infraspinatus was noted. The biceps was torn and had retracted out of the shoulder joint. Initially, the subscapularis was released anteriorly and posteriorly to discarded subscapularis to mobilize the tendon back to the level of the lesser tuberosity. The lesser tuberosity was debrided and prepared for a tendon repair. A single suture anchor was placed to the lesser tuberosity. The sutures were shuttled through the subscapularis and tied down. The scope was then brought in through the subacromial space. A lateral portal and posterolateral portal were made. The supraspinatus and infraspinatus tear was retracted and scarred in.  Releases were performed superior and inferior to the supraspinatus. There was also an L-shaped component of the tear between the infraspinatus and teres minor. A suture was placed into L portion of the tear. A traction suture was placed anteriorly. After the releases were performed and the tendon was able to be mobilized the arm was brought into abduction and suture anchors were placed at the articular margin, 1 anteriorly and 1 posteriorly at the greater tuberosity. These sutures were shuttled through the infraspinatus and supraspinatus. They were sequentially tied down. The suture through the L portion of the tear was tied down as well. The traction suture was removed. The tails from 1 tail from each suture limb was then used and brought down over the lateral aspect of the proximal humerus in a double row fashion completing the massive rotator cuff repair. Prior to repair an extensive subacromial bursectomy was performed as well. The scope instruments were removed. The wounds were closed in normal fashion. A sterile dressing was placed about the right shoulder. The right arm was placed into a shoulder immobilizer.   The patient was awakened from anesthesia and transferred to PACU in stable condition with plan for discharge home.    Ky Hatchet, PA-C was present for the procedure and necessary for patient positioning, implant placement, and rotator cuff repair as well as closure and transporting the patient to the PACU.       MD Oliver Krishnamurthy / Jesus Rojo  D: 01/28/2019 14:52     T: 01/28/2019 17:32  JOB #: [id number]

## 2019-01-29 NOTE — ANESTHESIA POSTPROCEDURE EVALUATION
Procedure(s):  RIGHT SHOULDER ARTHROSCOPIC ROTATOR CUFF REPAIR/ARTHREX/SPYDER/TMAX.     Anesthesia Post Evaluation      Multimodal analgesia: multimodal analgesia used between 6 hours prior to anesthesia start to PACU discharge  Patient location during evaluation: bedside  Patient participation: complete - patient participated  Level of consciousness: awake  Pain management: adequate  Airway patency: patent  Anesthetic complications: no  Cardiovascular status: stable  Respiratory status: acceptable  Hydration status: acceptable  Post anesthesia nausea and vomiting:  controlled      Visit Vitals  /82 (BP 1 Location: Left arm)   Pulse 77   Temp 36.7 °C (98 °F)   Resp 14   Ht 5' 10\" (1.778 m)   Wt 100.2 kg (221 lb)   SpO2 93%   BMI 31.71 kg/m²

## 2019-02-06 ENCOUNTER — TELEPHONE (OUTPATIENT)
Dept: INTERNAL MEDICINE CLINIC | Age: 64
End: 2019-02-06

## 2019-02-06 NOTE — TELEPHONE ENCOUNTER
Patient called and needs some one to please call him about some medication Furosemide . . [phone]

## 2019-02-07 NOTE — TELEPHONE ENCOUNTER
Patient called back to speak with nurse. He is having swelling in legs like he did before after another surgery. He has some fluid pills and he has taken 1/2 of one. He wants to make sure he is doing the right thing as far as what he should take. Please call him back today at [phone].

## 2019-02-07 NOTE — TELEPHONE ENCOUNTER
Patient called and advised that he may take the medication for swelling and if does not improve to call office   Patient verbalized understanding

## 2019-02-08 ENCOUNTER — OFFICE VISIT (OUTPATIENT)
Dept: ORTHOPEDIC SURGERY | Facility: CLINIC | Age: 64
End: 2019-02-08

## 2019-02-08 VITALS
OXYGEN SATURATION: 98 % | BODY MASS INDEX: 32.21 KG/M2 | WEIGHT: 225 LBS | RESPIRATION RATE: 18 BRPM | HEIGHT: 70 IN | SYSTOLIC BLOOD PRESSURE: 142 MMHG | HEART RATE: 86 BPM | DIASTOLIC BLOOD PRESSURE: 66 MMHG | TEMPERATURE: 96.3 F

## 2019-02-08 DIAGNOSIS — G89.18 POST-OP PAIN: ICD-10-CM

## 2019-02-08 DIAGNOSIS — Z98.890 STATUS POST ARTHROSCOPY OF RIGHT SHOULDER: ICD-10-CM

## 2019-02-08 DIAGNOSIS — M75.121 COMPLETE TEAR OF RIGHT ROTATOR CUFF: Primary | ICD-10-CM

## 2019-02-08 RX ORDER — OXYCODONE AND ACETAMINOPHEN 5; 325 MG/1; MG/1
1 TABLET ORAL
Qty: 40 TAB | Refills: 0 | Status: SHIPPED | OUTPATIENT
Start: 2019-02-08 | End: 2019-04-12 | Stop reason: ALTCHOICE

## 2019-02-08 NOTE — PROGRESS NOTES
Trevon Horvath  1955     HISTORY OF PRESENT ILLNESS  Trevon Horvath is a 70 y.o. male who presents today for evaluation s/p Right shoulder arthroscopic massive rotator cuff repair on 1/28/19. Patient has not been going to PT. Describes pain as a 3/10. Has been taking tylenol for pain. He has been compliant with his sling. Still has night pain. All his questions were answered today. Patient denies any fever, chills, chest pain, shortness of breath or calf pain. There are no new illness or injuries to report since last seen in the office. PHYSICAL EXAM:   Visit Vitals  /66   Pulse 86   Temp 96.3 °F (35.7 °C) (Oral)   Resp 18   Ht 5' 10\" (1.778 m)   Wt 225 lb (102.1 kg)   SpO2 98%   BMI 32.28 kg/m²      The patient is a well-developed, well-nourished male in no acute distress. The patient is alert and oriented times three. The patient appears to be well groomed. Mood and affect are normal.  ORTHOPEDIC EXAM of right shoulder:  Inspection: swelling present,  Bruising present  Incision, clean, dry, intact, sutures in place  Passive glenohumeral abduction 0-20 degrees in the sling otherwise not assessed  Stability: Stable  Strength: n/a  2+ distal pulses    IMPRESSION:  S/P Right shoulder arthroscopic massive rotator cuff repair    PLAN:   Pt doing well post operatively  Incisions cleaned. Sutures removed and steri strips applied  Surgery was discussed at length today. Continue wearing sling. He will be in the sling for 6 week  Stressed to patient that nothing causes an increase in pain. Pt given a refill of pain medication. Percocet 5 mg Patient given pain medication for short term acute pain relief. Goal is to treat patient according to above plan and to ultimately have patient off all pain medications once appropriate. If chronic pain management is required beyond what is expected for current orthopedic problem, will refer patient to pain management.   was reviewed and will be reviewed with every medication refill request.   RTC 3 weeks    Radha Rich PA-C  [facility]

## 2019-02-11 RX ORDER — MONTELUKAST SODIUM 10 MG/1
TABLET ORAL
Qty: 90 TAB | Refills: 0 | Status: SHIPPED | OUTPATIENT
Start: 2019-02-11 | End: 2019-03-29 | Stop reason: SDUPTHER

## 2019-02-13 ENCOUNTER — TELEPHONE (OUTPATIENT)
Dept: INTERNAL MEDICINE CLINIC | Age: 64
End: 2019-02-13

## 2019-02-19 RX ORDER — BUTALBITAL, ACETAMINOPHEN AND CAFFEINE 300; 40; 50 MG/1; MG/1; MG/1
1 CAPSULE ORAL
Qty: 90 CAP | Refills: 1 | Status: SHIPPED | OUTPATIENT
Start: 2019-02-19 | End: 2019-04-30 | Stop reason: SDUPTHER

## 2019-02-27 ENCOUNTER — DOCUMENTATION ONLY (OUTPATIENT)
Dept: ORTHOPEDIC SURGERY | Facility: CLINIC | Age: 64
End: 2019-02-27

## 2019-03-01 ENCOUNTER — OFFICE VISIT (OUTPATIENT)
Dept: ORTHOPEDIC SURGERY | Facility: CLINIC | Age: 64
End: 2019-03-01

## 2019-03-01 VITALS
OXYGEN SATURATION: 99 % | DIASTOLIC BLOOD PRESSURE: 76 MMHG | HEART RATE: 77 BPM | WEIGHT: 231 LBS | HEIGHT: 70 IN | BODY MASS INDEX: 33.07 KG/M2 | SYSTOLIC BLOOD PRESSURE: 142 MMHG | TEMPERATURE: 95.3 F | RESPIRATION RATE: 16 BRPM

## 2019-03-01 DIAGNOSIS — Z98.890 STATUS POST ARTHROSCOPY OF RIGHT SHOULDER: ICD-10-CM

## 2019-03-01 DIAGNOSIS — M75.121 COMPLETE TEAR OF RIGHT ROTATOR CUFF: Primary | ICD-10-CM

## 2019-03-01 RX ORDER — WARFARIN SODIUM 5 MG/1
TABLET ORAL
Qty: 75 TAB | Refills: 0 | Status: SHIPPED | OUTPATIENT
Start: 2019-03-01 | End: 2019-04-09 | Stop reason: SDUPTHER

## 2019-03-01 NOTE — PROGRESS NOTES
Cristal Serrano  1955     HISTORY OF PRESENT ILLNESS  Cristal Serrano is a 70 y.o. male who presents today for evaluation s/p Right shoulder arthroscopic massive rotator cuff repair on 1/28/19. Patient has not been going to PT. Describes pain as a 2/10. Has been taking tylenol for pain. He has been compliant with his sling. Still has night pain. All his questions were answered today. Patient denies any fever, chills, chest pain, shortness of breath or calf pain. There are no new illness or injuries to report since last seen in the office. PHYSICAL EXAM:   Visit Vitals  /76 (BP 1 Location: Left arm, BP Patient Position: Sitting)   Pulse 77   Temp 95.3 °F (35.2 °C) (Oral)   Resp 16   Ht 5' 10\" (1.778 m)   Wt 231 lb (104.8 kg)   SpO2 99%   BMI 33.15 kg/m²      The patient is a well-developed, well-nourished male in no acute distress. The patient is alert and oriented times three. The patient appears to be well groomed. Mood and affect are normal.  ORTHOPEDIC EXAM of right shoulder:  Inspection: swelling not present,  Bruising not present  Incision well healed  Passive glenohumeral abduction 0-20 degrees in the sling otherwise not assessed  Stability: Stable  Strength: n/a  2+ distal pulses    IMPRESSION:  S/P Right shoulder arthroscopic massive rotator cuff repair    PLAN:   Pt doing well post operatively  Continue wearing sling. He will be in the sling for 6 week. He will D/C the sling in 2 weeks, he was shown in the office PROM exercises to begin in 2 weeks. Stressed to patient that nothing causes an increase in pain. Pt not given a refill of pain medication.   RTC 4 weeks    ESPER[facility]

## 2019-03-06 ENCOUNTER — OFFICE VISIT (OUTPATIENT)
Dept: INTERNAL MEDICINE CLINIC | Age: 64
End: 2019-03-06

## 2019-03-06 ENCOUNTER — DOCUMENTATION ONLY (OUTPATIENT)
Dept: ORTHOPEDIC SURGERY | Facility: CLINIC | Age: 64
End: 2019-03-06

## 2019-03-06 VITALS
OXYGEN SATURATION: 98 % | BODY MASS INDEX: 31.64 KG/M2 | WEIGHT: 221 LBS | DIASTOLIC BLOOD PRESSURE: 80 MMHG | HEART RATE: 70 BPM | SYSTOLIC BLOOD PRESSURE: 140 MMHG | TEMPERATURE: 98.3 F | RESPIRATION RATE: 16 BRPM | HEIGHT: 70 IN

## 2019-03-06 DIAGNOSIS — Z86.718 PERSONAL HISTORY OF VENOUS THROMBOSIS AND EMBOLISM: ICD-10-CM

## 2019-03-06 DIAGNOSIS — R60.0 LEG EDEMA: Primary | ICD-10-CM

## 2019-03-06 DIAGNOSIS — I10 ESSENTIAL HYPERTENSION: ICD-10-CM

## 2019-03-06 LAB
INR BLD: 1.5
PT POC: NORMAL SECONDS
VALID INTERNAL CONTROL?: YES

## 2019-03-06 RX ORDER — CLINDAMYCIN HYDROCHLORIDE 300 MG/1
CAPSULE ORAL
Qty: 20 CAP | Refills: 0 | Status: SHIPPED | OUTPATIENT
Start: 2019-03-06 | End: 2019-04-12 | Stop reason: ALTCHOICE

## 2019-03-06 NOTE — PROGRESS NOTES
Chief Complaint   Patient presents with   [facility] Follow Up       Pt preferred language for health care discussion is english. Is someone accompanying this pt? Yes wife    Is the patient using any DME equipment during [address]? Sling        Depression Screening:  3 most recent [facility] Screens 3/1/2019 2/8/2019 1/8/2019 10/23/2018 10/19/2018 10/16/2018 10/8/2018   PHQ Not Done - Patient Decline - - Patient Decline - -   Little interest or pleasure in doing things Not at all - Not at all Not at all - Not at all Not at all   Feeling down, depressed, irritable, or hopeless Not at all - Not at all Not at all - Not at all Not at all   Total Score PHQ 2 0 - 0 0 - 0 0       Learning Assessment:  Learning Assessment 2/8/2019 11/6/2018 9/25/2015   PRIMARY LEARNER Patient Patient Patient   HIGHEST LEVEL OF EDUCATION - PRIMARY LEARNER  - GRADUATED HIGH SCHOOL OR GED GRADUATED HIGH SCHOOL OR GED   BARRIERS PRIMARY LEARNER - [address] [facility] CAREGIVER - No No   PRIMARY [address] [facility] [address]    NEED - - No   LEARNER PREFERENCE PRIMARY DEMONSTRATION DEMONSTRATION DEMONSTRATION   ANSWERED BY Patient patient patient   RELATIONSHIP SELF SELF SELF       Abuse Screening:  Abuse Screening Questionnaire 11/6/2018 7/3/2018 2/15/2016   Do you ever feel afraid of your partner? N N N   Are you in a relationship with someone who physically or mentally threatens you? N N N   Is it safe for you to go home? Y Y Y       Fall Risk  Fall Risk Assessment, last 12 mths 3/1/2019   Able to walk? Yes   Fall in past 12 months? No           Advance Directive:  1. Do you have an advance directive in place? Patient Reply:no    2. If not, would you like material regarding how to put one in place? Patient Reply: no.      Coordination of Care:  1. Have you been to the ER, urgent care clinic since your last visit? Hospitalized since your last visit? no    2.  Have you seen or consulted any other health care providers outside of the [facility] [address] since your last visit? Include any pap smears or colon screening.  no

## 2019-03-06 NOTE — PATIENT INSTRUCTIONS
Mr. Sai Simon is here today for anticoagulation monitoring for the diagnosis of DVT. His INR goal is 2.0-3.0 and his current Coumadin dose is 8 mg M/F, 10 mg all other days. Today's findings include an INR of 1.5 (normal INR range 0.8-1.2) . Considering Mr. Winters's past history, todays findings, and per the coumadin policy/protocol, Mr. Jose De Anda was instructed to take Coumadin as follows,  10 mg on fridays, 8 mg all other days. He was also instructed to schedule an appointment in 2 weeks prior to leaving for an INR check. A full discussion of the nature of anticoagulants has been carried out. A full discussion of the need for frequent and regular monitoring, precise dosage adjustment and compliance was stressed. Side effects of potential bleeding were discussed and Mr. Jose De Anda was instructed to call [phone] if there are any signs of abnormal bleeding. Mr. Jose De Anda was instructed to avoid any OTC items containing aspirin or ibuprofen and prior to starting any new OTC products to consult with his physician or pharmacist to ensure no drug interactions are present. Mr. Jose De Anda was instructed to avoid any major changes in his general diet and to avoid alcohol consumption. .      Mr. Jose De Anda verbalized his understanding of all instructions and will call the office with any questions, concerns, or signs of abnormal bleeding or blood clot.

## 2019-03-06 NOTE — PROGRESS NOTES
Forms completed & faxed to [facility] @ [employer]. Spoke with pt & pt made aware that forms are ready for  at the [facility] office. Pt verbalized understanding.

## 2019-03-06 NOTE — LETTER
19 RE:  Law Nicole : 1955 To whom it may concern: 
 
 I am Gordon Winters's primary care physician. Sincerely, Randi Black M.D.   FACP

## 2019-03-06 NOTE — LETTER
19 RE:  Jameel Watts :  1955 Please provide knee medical hose - light pressure -- 15 mm Hg                         for Jameel Watts Diagnosis:  LE Edema (R60.0) Thank you. Sincerely, Valeria Gilmore M.D.   FACP

## 2019-03-12 NOTE — PROGRESS NOTES
The patient presents to the office today with the chief complaint of LE edema    HPI    The patient is recovering from right shoulder surgery. He has swelling of both LE - worse on the right leg. Associated with some breakdown of his skin. The patient remains on Lisinopril and Labetalol for hypertension. The patient remains on Coumadin for chronic DVT. He is doing ok on the anticoagulant      Review of Systems   Respiratory: Negative for shortness of breath. Cardiovascular: Positive for leg swelling. Negative for chest pain. Allergies   Allergen Reactions    Amoxicillin Itching    Augmentin [Amoxicillin-Pot Clavulanate] Itching    Chlorhexidine Towelette Itching    Hibiclens [Chlorhexidine Gluconate] Itching    Milk Containing Products Diarrhea    Penicillins Rash    Requip [Ropinirole] Nausea and Vomiting       Current Outpatient Medications   Medication Sig Dispense Refill    furosemide (LASIX PO) Take  by mouth.  clindamycin (CLEOCIN) 300 mg capsule 1 cap three times per day 20 Cap 0    warfarin (COUMADIN) 5 mg tablet TAKE 2 AND 1/2 TABLETS BY MOUTH DAILY OR AS DIRECTED 75 Tab 0    butalbital-acetaminophen-caff (FIORICET) -40 mg per capsule Take 1 Cap by mouth every eight (8) hours as needed for Pain. 90 Cap 1    montelukast (SINGULAIR) 10 mg tablet TAKE 1 TABLET BY MOUTH EVERY DAY 90 Tab 0    oxyCODONE-acetaminophen (PERCOCET) 5-325 mg per tablet Take 1 Tab by mouth every six (6) hours as needed for Pain. Max Daily Amount: 4 Tabs. 40 Tab 0    oxyCODONE-acetaminophen (PERCOCET)  mg per tablet Take 1-2 Tabs by mouth every four (4) hours as needed for Pain. Max Daily Amount: 12 Tabs.  40 Tab 0    lisinopril-hydroCHLOROthiazide (PRINZIDE, ZESTORETIC) 20-12.5 mg per tablet TAKE 1 TABLET BY MOUTH DAILY 90 Tab 0    enoxaparin (LOVENOX) 80 mg/0.8 mL injection Per instructions given to the patient to use around the time of surgery 10 Syringe 0    lidocaine (LIDODERM) 5 % Apply patch to the affected area for 12 hours a day and remove for 12 hours a day. 30 Each 0    diclofenac (VOLTAREN) 1 % gel Apply 4 g to affected area four (4) times daily. Maximum 16 grams per joint per day. Dispense 5 100 gram tubes 5 Each 0    raNITIdine (ZANTAC) 150 mg tablet TK 1 T PO HS  1    labetalol (NORMODYNE) 100 mg tablet TAKE ONE TABLET BY MOUTH TWICE DAILY 180 Tab 0    ALPRAZolam (XANAX) 0.5 mg tablet Take one half(1/2) tab to one(1) tab by mouth at bedtime as needed for sleep 30 Tab 0    lansoprazole (PREVACID) 30 mg capsule TAKE 1 CAPSULE DAILY BEFOREBREAKFAST 90 Cap 3    warfarin (COUMADIN) 5 mg tablet TAKE 2 AND 1/2 TABLETS BY MOUTH DAILY OR AS DIRECTED 75 Tab 0    warfarin (COUMADIN) 3 mg tablet Or as directed 30 Tab 3    metaxalone (SKELAXIN) 800 mg tablet Take 1 Tab by mouth three (3) times daily. Indications: Muscle Spasm (Patient taking differently: Take 800 mg by mouth three (3) times daily as needed. Indications: Muscle Spasm) 90 Tab 2    montelukast (SINGULAIR) 10 mg tablet TAKE 1 TABLET BY MOUTH EVERY DAY (Patient taking differently: TAKE 1 TABLET BY MOUTH EVERY HS) 90 Tab 0    acetaminophen (TYLENOL) 500 mg tablet Take 1,000 mg by mouth every six (6) hours as needed for Pain.          Past Medical History:   Diagnosis Date    Arthritis     Bleeding     Chronic pain     knee and shoulder    GERD (gastroesophageal reflux disease)     Headache(784.0)     migraine    High cholesterol     Hypertension     Lower back pain 11/6/2010    Other chest pain     Pure hypercholesterolemia     Right buttock pain 11/6/2010    Right foot pain     Rotator cuff tear     left-since 2010, worsened tear Jan.    Rotator cuff tear, right     Spinal stenosis     Tendonitis, tibialis     anterior    Thromboembolus ([address]     3 after sx last one 2000       Past Surgical History:   Procedure Laterality Date    FOOT/TOES SURGERY PROC UNLISTED      HX BACK SURGERY      HX HEENT Right 09/2018    eye surgery    HX KNEE REPLACEMENT Left     HX ORTHOPAEDIC  06-25-12    Right foot with excision of bursa and adipose tissue from fifth metatarsal base by Dr. Kait Malave      lower back (1992 and 2000)    HX OTHER SURGICAL      left foot (2008)    HX OTHER SURGICAL      Retina repair     HX PROSTATECTOMY      LAMINOTOMY      NERVE BLOCK         Social History     Socioeconomic History    Marital status:      Spouse name: Not on file    Number of children: Not on file    Years of education: Not on file    Highest education level: Not on file   Social Needs    Financial resource strain: Not on file    Food insecurity - worry: Not on file    Food insecurity - inability: Not on file   [employer] needs - medical: Not on file   [employer] needs - non-medical: Not on file   Occupational History    Not on file   Tobacco Use    Smoking status: Never Smoker    Smokeless tobacco: Never Used   Substance and Sexual Activity    Alcohol use: No    Drug use: No    Sexual activity: Not Currently   Other Topics Concern    Not on file   Social History Narrative    Not on file       Patient does not have an advanced directive on file    Visit Vitals  /80 (BP 1 Location: Right arm, BP Patient Position: Sitting)   Pulse 70   Temp 98.3 °F (36.8 °C) (Tympanic)   Resp 16   Ht 5' 10\" (1.778 m)   Wt 221 lb (100.2 kg)   SpO2 98%   BMI 31.71 kg/m²       Physical Exam   Cardiovascular: Normal rate and regular rhythm. Exam reveals no gallop. No murmur heard. Pulmonary/Chest: He has no wheezes. He has no rales. Abdominal: Soft. He exhibits no distension. There is no tenderness. Musculoskeletal: He exhibits edema (2+ edema on the right. 1+ edema on the left).    Skin:   Slight erythema right leg       BMI:  BMI is high but it was not addressed during this visit due to recent surgery      Office Visit on 03/06/2019   Component Date Value Ref Range Status    VALID INTERNAL CONTROL POC 03/06/2019 Yes   Final    INR POC 03/06/2019 1.5   Final   Admission on 01/28/2019, Discharged on 01/28/2019   Component Date Value Ref Range Status    Prothrombin time 01/28/2019 13.5  11.5 - 15.2 sec Final    INR 01/28/2019 1.1  0.8 - 1.2   Final    Comment:            INR Therapeutic Ranges         (on stable oral anticoagulant):     INDICATION                INR  DVT/PE/Atrial Fib          2.0-3.0  MI/Mechanical Heart Valve  2.5-3.5     Hospital Outpatient Visit on 01/18/2019   Component Date Value Ref Range Status    Sodium 01/18/2019 141  136 - 145 mmol/L Final    Potassium 01/18/2019 4.4  3.5 - 5.5 mmol/L Final    Chloride 01/18/2019 105  100 - 108 mmol/L Final    CO2 01/18/2019 32  21 - 32 mmol/L Final    Anion gap 01/18/2019 4  3.0 - 18 mmol/L Final    Glucose 01/18/2019 88  74 - 99 mg/dL Final    BUN 01/18/2019 14  7.0 - 18 MG/DL Final    Creatinine 01/18/2019 0.90  0.6 - 1.3 MG/DL Final    BUN/Creatinine ratio 01/18/2019 16  12 - 20   Final    GFR est AA 01/18/2019 >60  >60 ml/min/1.73m2 Final    GFR est non-AA 01/18/2019 >60  >60 ml/min/1.73m2 Final    Comment: (NOTE)  Estimated GFR is calculated using the Modification of Diet in Renal   Disease (MDRD) Study equation, reported for both  Americans   (GFRAA) and non- Americans (GFRNA), and normalized to 1.73m2   body surface area. The physician must decide which value applies to   the patient. The MDRD study equation should only be used in   individuals age 25 or older. It has not been validated for the   following: pregnant women, patients with serious comorbid conditions,   or on certain medications, or persons with extremes of body size,   muscle mass, or nutritional status.  Calcium 01/18/2019 9.1  8.5 - 10.1 MG/DL Final    Bilirubin, total 01/18/2019 0.3  0.2 - 1.0 MG/DL Final    ALT (SGPT) 01/18/2019 26  16 - 61 U/L Final    AST (SGOT) 01/18/2019 19  15 - 37 U/L Final    Alk.  phosphatase 01/18/2019 70  45 - 117 U/L Final    Protein, total 01/18/2019 6.3* 6.4 - 8.2 g/dL Final    Albumin 01/18/2019 3.7  3.4 - 5.0 g/dL Final    Globulin 01/18/2019 2.6  2.0 - 4.0 g/dL Final    A-G Ratio 01/18/2019 1.4  0.8 - 1.7   Final    WBC 01/18/2019 5.9  4.6 - 13.2 K/uL Final    RBC 01/18/2019 4.36* 4.70 - 5.50 M/uL Final    HGB 01/18/2019 13.8  13.0 - 16.0 g/dL Final    HCT 01/18/2019 41.3  36.0 - 48.0 % Final    MCV 01/18/2019 94.7  74.0 - 97.0 FL Final    MCH 01/18/2019 31.7  24.0 - 34.0 PG Final    MCHC 01/18/2019 33.4  31.0 - 37.0 g/dL Final    RDW 01/18/2019 12.6  11.6 - 14.5 % Final    PLATELET 59/16/0312 045  135 - 420 K/uL Final    MPV 01/18/2019 10.5  9.2 - 11.8 FL Final    NEUTROPHILS 01/18/2019 58  40 - 73 % Final    LYMPHOCYTES 01/18/2019 24  21 - 52 % Final    MONOCYTES 01/18/2019 15* 3 - 10 % Final    EOSINOPHILS 01/18/2019 2  0 - 5 % Final    BASOPHILS 01/18/2019 1  0 - 2 % Final    ABS. NEUTROPHILS 01/18/2019 3.4  1.8 - 8.0 K/UL Final    ABS. LYMPHOCYTES 01/18/2019 1.4  0.9 - 3.6 K/UL Final    ABS. MONOCYTES 01/18/2019 0.9  0.05 - 1.2 K/UL Final    ABS. EOSINOPHILS 01/18/2019 0.1  0.0 - 0.4 K/UL Final    ABS.  BASOPHILS 01/18/2019 0.1  0.0 - 0.1 K/UL Final    DF 01/18/2019 AUTOMATED    Final    aPTT 01/18/2019 33.7  23.0 - 36.4 SEC Final    Prothrombin time 01/18/2019 25.2* 11.5 - 15.2 sec Final    INR 01/18/2019 2.3* 0.8 - 1.2   Final    Comment:            INR Therapeutic Ranges         (on stable oral anticoagulant):     INDICATION                INR  DVT/PE/Atrial Fib          2.0-3.0  MI/Mechanical Heart Valve  2.5-3.5     Office Visit on 01/18/2019   Component Date Value Ref Range Status    VALID INTERNAL CONTROL POC 01/18/2019 Yes   Final    INR POC 01/18/2019 2.4   Final   Office Visit on 12/20/2018   Component Date Value Ref Range Status    Glucose (UA POC) 12/20/2018 Negative  Negative Final    Bilirubin (UA POC) 12/20/2018 Negative  Negative Final    Ketones (UA POC) 12/20/2018 Negative  Negative Final    Specific gravity (UA POC) 12/20/2018 1.020  1.001 - 1.035 Final    Blood (UA POC) 12/20/2018 Trace  Negative Final    pH (UA POC) 12/20/2018 6.0  4.6 - 8.0 Final    Protein (UA POC) 12/20/2018 Negative  Negative Final    Urobilinogen (UA POC) 12/20/2018 0.2 mg/dL  0.2 - 1 Final    Nitrites (UA POC) 12/20/2018 Negative  Negative Final    Leukocyte esterase (UA POC) 12/20/2018 Negative  Negative Final    Prostate Specific Ag 12/20/2018 <0.03   0 - 4 ng/mL Final    Comment: (Methodology: Roche ECLIA)             . Results for orders placed or performed in visit on 03/06/19   AMB POC PT/INR   Result Value Ref Range    VALID INTERNAL CONTROL POC Yes     Prothrombin time (POC)  seconds    INR POC 1.5        Assessment / Plan      ICD-10-CM ICD-9-CM    1. Leg edema R60.0 782.3 AMB POC PT/INR   2. Personal history of venous thrombosis and embolism Z86.718 V12.51 AMB POC PT/INR   3. Essential hypertension I10 401.9        Clindamycin  PRN Lasix  he was advised to continue his maintenance medications  PC protime    Follow-up Disposition:  Return in about 6 weeks (around 4/17/2019). I asked Geni Fan if he has any questions and I answered the questions.   Geni Fan states that he understands the treatment plan and agrees with the treatment plan

## 2019-03-25 ENCOUNTER — OFFICE VISIT (OUTPATIENT)
Dept: ORTHOPEDIC SURGERY | Age: 64
End: 2019-03-25

## 2019-03-25 ENCOUNTER — CLINICAL SUPPORT (OUTPATIENT)
Dept: FAMILY MEDICINE CLINIC | Facility: CLINIC | Age: 64
End: 2019-03-25

## 2019-03-25 VITALS
WEIGHT: 234.4 LBS | BODY MASS INDEX: 33.56 KG/M2 | DIASTOLIC BLOOD PRESSURE: 75 MMHG | TEMPERATURE: 97.8 F | SYSTOLIC BLOOD PRESSURE: 131 MMHG | HEIGHT: 70 IN | RESPIRATION RATE: 18 BRPM | HEART RATE: 76 BPM | OXYGEN SATURATION: 97 %

## 2019-03-25 DIAGNOSIS — Z79.01 WARFARIN ANTICOAGULATION: ICD-10-CM

## 2019-03-25 DIAGNOSIS — Z86.718 PERSONAL HISTORY OF VENOUS THROMBOSIS AND EMBOLISM: Primary | ICD-10-CM

## 2019-03-25 DIAGNOSIS — M47.816 FACET ARTHROPATHY, LUMBAR: ICD-10-CM

## 2019-03-25 DIAGNOSIS — M51.26 DISPLACEMENT OF LUMBAR INTERVERTEBRAL DISC WITHOUT MYELOPATHY: Primary | ICD-10-CM

## 2019-03-25 DIAGNOSIS — S33.8XXA SACRUM SPRAIN, INITIAL ENCOUNTER: ICD-10-CM

## 2019-03-25 DIAGNOSIS — R60.9 PERIPHERAL EDEMA: ICD-10-CM

## 2019-03-25 DIAGNOSIS — Z79.01 CHRONIC ANTICOAGULATION: ICD-10-CM

## 2019-03-25 DIAGNOSIS — M48.061 SPINAL STENOSIS OF LUMBAR REGION WITHOUT NEUROGENIC CLAUDICATION: ICD-10-CM

## 2019-03-25 DIAGNOSIS — M51.27 LUMBAGO-SCIATICA DUE TO DISPLACEMENT OF LUMBAR INTERVERTEBRAL DISC: ICD-10-CM

## 2019-03-25 DIAGNOSIS — M62.838 MUSCLE SPASM: ICD-10-CM

## 2019-03-25 LAB
INR BLD: 1.7
PT POC: NORMAL SECONDS
VALID INTERNAL CONTROL?: YES

## 2019-03-25 RX ORDER — METHYLPREDNISOLONE 4 MG/1
TABLET ORAL
Qty: 1 DOSE PACK | Refills: 0 | Status: SHIPPED | OUTPATIENT
Start: 2019-03-25 | End: 2019-04-19 | Stop reason: ALTCHOICE

## 2019-03-25 RX ORDER — METAXALONE 800 MG/1
800 TABLET ORAL 3 TIMES DAILY
Qty: 90 TAB | Refills: 2 | Status: SHIPPED | OUTPATIENT
Start: 2019-03-25 | End: 2019-10-25 | Stop reason: ALTCHOICE

## 2019-03-25 NOTE — PROGRESS NOTES
[facility]  [address] [place], [facility] [address]  Phone: [phone]  Fax: [phone]    Pt's YOB: 1955    ASSESSMENT   Diagnoses and all orders for this visit:    1. Displacement of lumbar intervertebral disc without myelopathy    2. Lumbago-sciatica due to displacement of lumbar intervertebral disc    3. Sacrum sprain, initial encounter    4. Facet arthropathy, lumbar  -     methylPREDNISolone (MEDROL DOSEPACK) 4 mg tablet; Per dose pack instructions    5. Spinal stenosis of lumbar region without neurogenic claudication    6. Muscle spasm  -     metaxalone (SKELAXIN) 800 mg tablet; Take 1 Tab by mouth three (3) times daily. Indications: muscle spasm    7. Chronic anticoagulation    8. Peripheral edema         IMPRESSION AND PLAN:  Opal Lew is a 70 y.o. male with history of  lumbar pain and more recently cervical pain. He underwent right shoulder arthroscopic rotator cuff repair on 01/28/2019 with Dr. Meron Morse. Pt admits to swelling in the right calf and notes that he wears compression stockings regularly. He takes Skelaxin 800 mg as needed and reports relief when previously taking a Medrol Dosepak. 1) Pt was given information on cervical and lumbar arthritis exercises. 2) He is not a candidate for NSAID's due to anticoagulation with Coumadin. 3) Pt received a refill of Skelaxin 800 mg 1 tab TID prn muscle spasm. 4) I recommended the patient regularly elevate his legs to address his swelling. 5) He was prescribed a Medrol Dosepak. 6) Mr. Javi Escudero has a reminder for a \"due or due soon\" health maintenance. I have asked that he contact his primary care provider, Demetra Rock MD, for follow-up on this health maintenance. 7)  demonstrated consistency with prescribing. 8) Pt will follow-up in 4-6 months or sooner if needed.       HISTORY OF PRESENT ILLNESS:  Opal Lew is a 70 y.o. male with history of cervical and lumbar pain and presents to the office today for follow up. He still experiences muscle spasm in his lower back and has inflammatory lumbar pain (sharp at times, as well as aching) but is unable to take NSAID's due to use of coumadin. Since his last office visit, he has had a number of significant health events, as well as personal events. He notes that he had emergency eye surgery on 09/11/2018 to address a macular hole and had to lie flat for 2 weeks. Pt states that his 80 y.o. mother passed away on 09/26/2018 and on 11/10/2018 he had his prostate removed for prostate cancer. He underwent right shoulder arthroscopic rotator cuff repair on 01/28/2019 with Dr. Jared Mann. Pt notes that he was in a sling until 1.5 weeks ago. Of note, he had a previous left knee surgery with Dr. Mobley. He admits to swelling in the right calf and foot since his knee surgery and notes that he wears compression stockings regularly. Pt notes that he pumps his legs regularly and states that he has been evaluated for DVT's twice ( and this was negative). He states that in general, his swelling has improved upon waking. Pt takes Skelaxin 800 mg as needed and needs a refill. He reports relief with a Medrol Dosepak and requests a refill at this time. Pt desires to continue with current care.     Pain Scale: 1/10    PCP: Pito Meléndez MD     Past Medical History:   Diagnosis Date    Arthritis     Bleeding     Chronic pain     knee and shoulder    GERD (gastroesophageal reflux disease)     Headache(784.0)     migraine    High cholesterol     Hypertension     Lower back pain 11/6/2010    Other chest pain     Pure hypercholesterolemia     Right buttock pain 11/6/2010    Right foot pain     Rotator cuff tear     left-since 2010, worsened tear Jan.    Rotator cuff tear, right     Spinal stenosis     Tendonitis, tibialis     anterior    Thromboembolus ([facility] [address]     3 after sx last one 2000        Social History Socioeconomic History    Marital status:      Spouse name: Not on file    Number of children: Not on file    Years of education: Not on file    Highest education level: Not on file   Occupational History    Not on file   Social Needs    Financial resource strain: Not on file    Food insecurity:     Worry: Not on file     Inability: Not on file    Transportation needs:     Medical: Not on file     Non-medical: Not on file   Tobacco Use    Smoking status: Never Smoker    Smokeless tobacco: Never Used   Substance and Sexual Activity    Alcohol use: No    Drug use: No    Sexual activity: Not Currently   Lifestyle    Physical activity:     Days per week: Not on file     Minutes per session: Not on file    Stress: Not on file   Relationships    Social connections:     Talks on phone: Not on file     Gets together: Not on file     Attends [employer] service: Not on file     Active member of club or organization: Not on file     Attends meetings of clubs or organizations: Not on file     Relationship status: Not on file    Intimate partner violence:     Fear of current or ex partner: Not on file     Emotionally abused: Not on file     Physically abused: Not on file     Forced sexual activity: Not on file   Other Topics Concern     Service Not Asked    Blood Transfusions Not Asked    Caffeine Concern Not Asked    Occupational Exposure Not Asked   Marciana Bers Hazards Not Asked    Sleep Concern Not Asked    Stress Concern Not Asked    Weight Concern Not Asked    Special Diet Not Asked    Back Care Not Asked    Exercise Not Asked    Bike Helmet Not Asked    Seat Belt Not Asked    Self-Exams Not Asked   Social History Narrative    Not on file       Current Outpatient Medications   Medication Sig Dispense Refill    methylPREDNISolone (MEDROL DOSEPACK) 4 mg tablet Per dose pack instructions 1 Dose Pack 0    metaxalone (SKELAXIN) 800 mg tablet Take 1 Tab by mouth three (3) times daily. Indications: muscle spasm 90 Tab 2    butalbital-acetaminophen-caff (FIORICET) -40 mg per capsule Take 1 Cap by mouth every eight (8) hours as needed for Pain. 90 Cap 1    lisinopril-hydroCHLOROthiazide (PRINZIDE, ZESTORETIC) 20-12.5 mg per tablet TAKE 1 TABLET BY MOUTH DAILY 90 Tab 0    diclofenac (VOLTAREN) 1 % gel Apply 4 g to affected area four (4) times daily. Maximum 16 grams per joint per day. Dispense 5 100 gram tubes 5 Each 0    raNITIdine (ZANTAC) 150 mg tablet TK 1 T PO HS  1    labetalol (NORMODYNE) 100 mg tablet TAKE ONE TABLET BY MOUTH TWICE DAILY 180 Tab 0    ALPRAZolam (XANAX) 0.5 mg tablet Take one half(1/2) tab to one(1) tab by mouth at bedtime as needed for sleep 30 Tab 0    lansoprazole (PREVACID) 30 mg capsule TAKE 1 CAPSULE DAILY BEFOREBREAKFAST 90 Cap 3    warfarin (COUMADIN) 5 mg tablet TAKE 2 AND 1/2 TABLETS BY MOUTH DAILY OR AS DIRECTED 75 Tab 0    warfarin (COUMADIN) 3 mg tablet Or as directed 30 Tab 3    montelukast (SINGULAIR) 10 mg tablet TAKE 1 TABLET BY MOUTH EVERY DAY (Patient taking differently: TAKE 1 TABLET BY MOUTH EVERY HS) 90 Tab 0    acetaminophen (TYLENOL) 500 mg tablet Take 1,000 mg by mouth every six (6) hours as needed for Pain.  furosemide (LASIX PO) Take  by mouth.  clindamycin (CLEOCIN) 300 mg capsule 1 cap three times per day 20 Cap 0    warfarin (COUMADIN) 5 mg tablet TAKE 2 AND 1/2 TABLETS BY MOUTH DAILY OR AS DIRECTED 75 Tab 0    montelukast (SINGULAIR) 10 mg tablet TAKE 1 TABLET BY MOUTH EVERY DAY 90 Tab 0    oxyCODONE-acetaminophen (PERCOCET) 5-325 mg per tablet Take 1 Tab by mouth every six (6) hours as needed for Pain. Max Daily Amount: 4 Tabs. 40 Tab 0    oxyCODONE-acetaminophen (PERCOCET)  mg per tablet Take 1-2 Tabs by mouth every four (4) hours as needed for Pain. Max Daily Amount: 12 Tabs.  40 Tab 0    enoxaparin (LOVENOX) 80 mg/0.8 mL injection Per instructions given to the patient to use around the time of surgery 10 Syringe 0    lidocaine (LIDODERM) 5 % Apply patch to the affected area for 12 hours a day and remove for 12 hours a day. 30 Each 0       Allergies   Allergen Reactions    Amoxicillin Itching    Augmentin [Amoxicillin-Pot Clavulanate] Itching    Chlorhexidine Towelette Itching    Hibiclens [Chlorhexidine Gluconate] Itching    Milk Containing Products Diarrhea    Penicillins Rash    Requip [Ropinirole] Nausea and Vomiting         REVIEW OF SYSTEMS    Constitutional: Negative for fever, chills, or weight change. Respiratory: Negative for cough or shortness of breath. Cardiovascular: Negative for chest pain or palpitations; positive for leg swelling  Gastrointestinal: Negative for acid reflux, change in bowel habits, or constipation. Genitourinary: Negative for dysuria and flank pain. Musculoskeletal: Positive for lumbar pain. Skin: Negative for rash. Neurological: Negative for headaches, dizziness, or numbness. Endo/Heme/Allergies: Negative for increased bruising. Psychiatric/Behavioral: Negative for difficulty with sleep. PHYSICAL EXAMINATION  Visit Vitals  /75 (BP 1 Location: Left arm)   Pulse 76   Temp 97.8 °F (36.6 °C)   Resp 18   Ht 5' 10\" (1.778 m)   Wt 234 lb 6.4 oz (106.3 kg)   SpO2 97%   BMI 33.63 kg/m²       Constitutional: Awake, alert, and in no acute distress. Neurological: 1+ symmetrical DTRs in the upper extremities. 1+ symmetrical DTRs in the lower extremities. Sensation to light touch is intact. Negative Eliecer's sign bilaterally. Skin: warm, dry, and intact; 2+ pretibial edema. Musculoskeletal: Tenderness to palpation in the lower lumbar region. Moderate pain with extension and axial loading. No pain with internal or external rotation of his hips. Negative straight leg raise bilaterally.       Biceps  Triceps Deltoids Wrist Ext Wrist Flex Hand Intrin   Right +4/5 +4/5 +4/5 +4/5 +4/5 +4/5   Left +4/5 +4/5 +4/5 +4/5 +4/5 +4/5      Hip Flex  Quads Hamstrings Ankle DF EHL Ankle PF   Right +4/5 +4/5 +4/5 +4/5 +4/5 +4/5   Left +4/5 +4/5 +4/5 +4/5 +4/5 +4/5     IMAGING:    Lumbar spine MRI from 01/24/2018 was personally reviewed with the patient and demonstrated:  Results from [facility] on 01/24/18   MRI LUMB SPINE W WO CONT     Narrative MR lumbar spine with and without contrast    CPT CODE: 93126    HISTORY: Chronic and worsening low back pain with left lower extremity pain. Increasing weakness. Remote history of surgeries. No recent injury. COMPARISON: MRI January 2013. TECHNIQUE: Lumbar spine scanned with axial and sagittal T1W scans, axial and  sagittal T2W scans, and with post gadolinium axial and sagittal T1W scans. Contrast used: 10 cc Gadavist.    FINDINGS:     Prior laminotomies on the left at L4-5 and bilaterally at L5-S1. No fracture,  bone destruction, or fluid collection. Intact lordosis. Normal vertebral body heights. Small less than 5 mm low signal  focus within anterior L4, developed since 2013. No similar focus elsewhere. No  aggressive features. Otherwise generally fatty marrow signal with some chronic  fatty endplate changes straddling L5-S1. Moderate to severe disc space narrowing  and disc desiccation of that level. Mild to moderate narrowing and desiccation  of L3-4 and L4-5. Conus at T12-L1. Axial imaging correlation:    T12-L1:  Patent canal and foramina. L1-L2: Patent canal and foramina. L2-L3: Patent canal and foramina. L3-L4: Broad-based disc osteophyte complex. Bilateral facet arthropathy with  ligamentum flavum thickening and buckling. Moderate concentric spinal stenosis. Particular narrowing of lateral recesses with transient distortion of the  crossing L4 nerves. Axial T2 image 20. Patent foramina.  Progression of  degenerative findings. L4-L5: Postoperative level. Mild broad-based disc osteophyte complex with a  small amount of enhancing scar tissue. Hypertrophic facet arthropathy. Moderate  spinal stenosis. Narrowing of the lateral recesses left more than right. Transient distortion of the crossing nerves particularly left L5. Axial T2 image  13. Mild foraminal stenoses.  Unchanged. L5-S1: Postoperative level. Broad-based disc osteophyte complex with enhancing  scar tissue centrally. Hypertrophic facet arthropathy. No significant spinal  stenosis. Moderately severe bilateral foraminal stenoses. Unchanged. Incidental imaging of regional soft tissues unremarkable.                  Impression IMPRESSION:    1. Some progression of degenerative disc and facet disease at L3-4, with  moderate spinal stenosis and potentially impingement of the crossing L4 nerves,  left more than right. 2. Stable postsurgical and degenerative findings at L4-5 and L5-S1.             Bilateral knee x-rays from 03/14/2017 demonstrated:  Medial compartment narrowing bilaterally, L>R        Cervical Spine MRI from 12/19/2016 demonstrated:  Results from Hospital Encounter on 12/19/16   MRI CERV SPINE WO CONT     Narrative MRI of cervical spine without contrast    HISTORY: Chronic progressive neck pain with headaches. COMPARISON: No prior MRI. Cervical spine radiographs from 12/1/2016. TECHNIQUE: T1 weighted, T2 FSE, FSE inversion recovery sagittal images are  supplemented by T2 weighted and GRE/medic axial images. FINDINGS: Normal alignment and vertebral body heights. Normal marrow signal  except for mild endplate degenerative change. Cervical cord is normal in signal  and caliber. Visualized posterior fossa contents look normal. Paraspinal soft  tissues look normal.    C2-3: No significant degenerative disc disease or spinal stenosis. C3-4: Small nonfocal disc protrusion which contacts the ventral cord and causes  borderline spinal stenosis. No foraminal stenosis. C4-5: No significant degenerative disc disease. Mildly hypertrophic facets. No  spinal stenosis.     C5-6: Disc is narrowed with diffusely bulging disc and osteophyte complex. Facets are mildly hypertrophic. Mild spinal canal and moderate left foraminal  stenoses. C6-7: Disc is narrowed with diffusely bulging disc and osteophyte complex. Facets are mildly hypertrophic. Mild spinal canal and moderate bilateral  foraminal stenoses. C7-T1: No significant degenerative disc disease or spinal stenosis.               Impression Impression:    Disc osteophyte complexes causing mild spinal canal stenoses at C5-6 and C6-7. Moderate left foraminal stenosis at C5-6 and moderate bilateral foraminal  stenoses at C6-7.       Written by Linus Colin MD, [facility], as dictated by Giulia Chandra MD.  I, Dr. Giulia Chandra confirm that all documentation is accurate.

## 2019-03-25 NOTE — PROGRESS NOTES
Mr. Vale Miranda is here today for anticoagulation monitoring for the diagnosis of DVT. His INR goal is 2.0-3.0 and his current Coumadin dose is 10 mg x 6 days a week, 8 mg x 1 day a week. Today's findings include an INR of 1.7 (normal INR range 0.8-1.2) . Considering Mr. Winters's past history, todays findings, and per the coumadin policy/protocol, Mr. Shaun William was instructed to take Coumadin as follows,  Same dose. He was also instructed to schedule an appointment in 1 1/2 weeks prior to leaving for an INR check. A full discussion of the nature of anticoagulants has been carried out. A full discussion of the need for frequent and regular monitoring, precise dosage adjustment and compliance was stressed. Side effects of potential bleeding were discussed and Mr. Shaun William was instructed to call [phone] if there are any signs of abnormal bleeding. Mr. Shaun William was instructed to avoid any OTC items containing aspirin or ibuprofen and prior to starting any new OTC products to consult with his physician or pharmacist to ensure no drug interactions are present. Mr. Shaun William was instructed to avoid any major changes in his general diet and to avoid alcohol consumption. .    Mr. Shaun William verbalized his understanding of all instructions and will call the office with any questions, concerns, or signs of abnormal bleeding or blood clot.

## 2019-03-25 NOTE — PATIENT INSTRUCTIONS

## 2019-03-27 RX ORDER — LABETALOL 100 MG/1
TABLET, FILM COATED ORAL
Qty: 180 TAB | Refills: 0 | Status: SHIPPED | OUTPATIENT
Start: 2019-03-27 | End: 2019-09-03 | Stop reason: SDUPTHER

## 2019-03-29 ENCOUNTER — OFFICE VISIT (OUTPATIENT)
Dept: ORTHOPEDIC SURGERY | Age: 64
End: 2019-03-29

## 2019-03-29 VITALS
DIASTOLIC BLOOD PRESSURE: 85 MMHG | OXYGEN SATURATION: 98 % | HEIGHT: 70 IN | WEIGHT: 235 LBS | RESPIRATION RATE: 16 BRPM | BODY MASS INDEX: 33.64 KG/M2 | TEMPERATURE: 97.3 F | SYSTOLIC BLOOD PRESSURE: 146 MMHG | HEART RATE: 75 BPM

## 2019-03-29 DIAGNOSIS — Z98.890 STATUS POST ARTHROSCOPY OF RIGHT SHOULDER: ICD-10-CM

## 2019-03-29 DIAGNOSIS — Z96.652 STATUS POST TOTAL LEFT KNEE REPLACEMENT: ICD-10-CM

## 2019-03-29 DIAGNOSIS — M75.121 COMPLETE TEAR OF RIGHT ROTATOR CUFF: Primary | ICD-10-CM

## 2019-03-29 NOTE — PROGRESS NOTES
Floyd Thorne  1955     HISTORY OF PRESENT ILLNESS  Floyd Thorne is a 70 y.o. male who presents today for evaluation s/p Right shoulder arthroscopic massive rotator cuff repair on 1/28/19. Patient has not been going to PT. Describes pain as a 3/10. Has been taking tylenol for pain. He has been compliant with his exercises at home. He is having some pain and weakness in his left knee when getting up from a chair. Once he gets going he is fine, he would like to go back to PT to work on this. All his questions were answered today. Patient denies any fever, chills, chest pain, shortness of breath or calf pain. There are no new illness or injuries to report since last seen in the office. PHYSICAL EXAM:   Visit Vitals  /85 (BP 1 Location: Left arm, BP Patient Position: Sitting)   Pulse 75   Temp 97.3 °F (36.3 °C) (Oral)   Resp 16   Ht 5' 10\" (1.778 m)   Wt 235 lb (106.6 kg)   SpO2 98%   BMI 33.72 kg/m²      The patient is a well-developed, well-nourished male in no acute distress. The patient is alert and oriented times three. The patient appears to be well groomed. Mood and affect are normal.  ORTHOPEDIC EXAM of right shoulder:  Inspection: swelling not present,  Bruising not present  Incision well healed  Passive glenohumeral abduction 0-70 degrees, 90 FF, 20 ER  Stability: Stable  Strength: n/a  2+ distal pulses    ORTHOPEDIC EXAM of Left knee: Inspection: Effusion not present,  Incision well healed  TTP: none   Range of motion: 0-120 flexion  Stability: Stable   Strength: 5/5  2+ distal pulses      IMPRESSION:  S/P Right shoulder arthroscopic massive rotator cuff repair    PLAN:   Pt doing well post operatively  He has worked on his PROM exercises at home. Will start some PT to work on PROM at this time. Will advance to [facility] and [facility] per protocol. No lifting, pushing, pulling with operative arm  Stressed to patient that nothing causes an increase in pain.   Pt not given a refill of pain medication. RTC 4 weeks    Patient seen and evaluated by Dr. Camelia Castanon today who agrees with treatment plan    S/p left total knee arthroplasty  Will order PT to help with his left knee quadriceps weakness. His ROM looks great but he is complaining of feeling stiff at times.     [facility]

## 2019-03-29 NOTE — PROGRESS NOTES
1. Have you been to the ER, urgent care clinic since your last visit? Hospitalized since your last visit? No      2. Have you seen or consulted any other health care providers outside of the [address] [place] [address] since your last visit? Include any pap smears or colon screening.  No

## 2019-04-09 ENCOUNTER — DOCUMENTATION ONLY (OUTPATIENT)
Dept: ORTHOPEDIC SURGERY | Facility: CLINIC | Age: 64
End: 2019-04-09

## 2019-04-09 RX ORDER — WARFARIN SODIUM 5 MG/1
TABLET ORAL
Qty: 75 TAB | Refills: 0 | Status: SHIPPED | OUTPATIENT
Start: 2019-04-09 | End: 2019-04-26 | Stop reason: SDUPTHER

## 2019-04-09 NOTE — PROGRESS NOTES
Patient dropped off Continuing Disability Claim form to be completed at [facility] office. Patient would like to pick-up at same location and can be reached at [phone] when ready.

## 2019-04-11 ENCOUNTER — CLINICAL SUPPORT (OUTPATIENT)
Dept: FAMILY MEDICINE CLINIC | Facility: CLINIC | Age: 64
End: 2019-04-11

## 2019-04-11 DIAGNOSIS — Z86.718 PERSONAL HISTORY OF VENOUS THROMBOSIS AND EMBOLISM: Primary | ICD-10-CM

## 2019-04-11 DIAGNOSIS — Z79.01 WARFARIN ANTICOAGULATION: ICD-10-CM

## 2019-04-11 NOTE — PROGRESS NOTES
Mr. Alejandrina Bennett is here today for anticoagulation monitoring for the diagnosis of DVT . His INR goal is 2.0-3.0 and his current Coumadin dose is 10 mg x 6 days a week , 8 mg 1 day a week     Today's findings include an INR of 1.7 (normal INR range 0.8-1.2) . Considering Mr. Winters's past history, todays findings, and per the coumadin policy/protocol, Mr. Sai Collins was instructed to take Coumadin as follows,  Same dose. He was also instructed to schedule an appointment in 1 weeks prior to leaving for an INR check. A full discussion of the nature of anticoagulants has been carried out. A full discussion of the need for frequent and regular monitoring, precise dosage adjustment and compliance was stressed. Side effects of potential bleeding were discussed and Mr. Sai Collins was instructed to call [phone] if there are any signs of abnormal bleeding. Mr. Sai Collins was instructed to avoid any OTC items containing aspirin or ibuprofen and prior to starting any new OTC products to consult with his physician or pharmacist to ensure no drug interactions are present. Mr. Sai Collins was instructed to avoid any major changes in his general diet and to avoid alcohol consumption. .  Mr. Sai Collins verbalized his understanding of all instructions and will call the office with any questions, concerns, or signs of abnormal bleeding or blood clot.

## 2019-04-12 ENCOUNTER — OFFICE VISIT (OUTPATIENT)
Dept: FAMILY MEDICINE CLINIC | Facility: CLINIC | Age: 64
End: 2019-04-12

## 2019-04-12 VITALS
RESPIRATION RATE: 16 BRPM | HEIGHT: 70 IN | HEART RATE: 77 BPM | SYSTOLIC BLOOD PRESSURE: 140 MMHG | TEMPERATURE: 98 F | BODY MASS INDEX: 33.36 KG/M2 | DIASTOLIC BLOOD PRESSURE: 82 MMHG | WEIGHT: 233 LBS | OXYGEN SATURATION: 99 %

## 2019-04-12 DIAGNOSIS — M76.32 ILIOTIBIAL BAND TENDINITIS OF LEFT SIDE: ICD-10-CM

## 2019-04-12 DIAGNOSIS — R60.0 BILATERAL LEG EDEMA: Primary | ICD-10-CM

## 2019-04-12 DIAGNOSIS — Z79.01 WARFARIN ANTICOAGULATION: ICD-10-CM

## 2019-04-12 DIAGNOSIS — Z86.718 PERSONAL HISTORY OF VENOUS THROMBOSIS AND EMBOLISM: ICD-10-CM

## 2019-04-12 DIAGNOSIS — C61 MALIGNANT NEOPLASM OF PROSTATE (HCC): ICD-10-CM

## 2019-04-12 RX ORDER — FUROSEMIDE 20 MG/1
TABLET ORAL
Qty: 30 TAB | Refills: 1 | Status: SHIPPED | OUTPATIENT
Start: 2019-04-12 | End: 2019-05-03 | Stop reason: ALTCHOICE

## 2019-04-12 RX ORDER — CELECOXIB 50 MG/1
50 CAPSULE ORAL 2 TIMES DAILY
COMMUNITY
End: 2019-08-08 | Stop reason: ALTCHOICE

## 2019-04-12 NOTE — TELEPHONE ENCOUNTER
Patient called requesting a refill of diclofenac (VOLTAREN) 1 % [facility] [address] [facility] Please advise patient back when completed at [phone].

## 2019-04-15 RX ORDER — DICLOFENAC SODIUM 10 MG/G
GEL TOPICAL
Qty: 500 G | Refills: 0 | OUTPATIENT
Start: 2019-04-15

## 2019-04-15 NOTE — TELEPHONE ENCOUNTER
Rx for Voltaren denied. Patient should not be using Voltaren gel while taking Coumadin. Although there is minimal absorption, there is no way to tell his threshold. Patient can use OTC Biofreeze or Asper Cream with Lidocaine.       Nj Aj PA-C  4/15/2019   1:14 PM

## 2019-04-16 ENCOUNTER — HOSPITAL ENCOUNTER (OUTPATIENT)
Dept: PHYSICAL THERAPY | Age: 64
Discharge: HOME | End: 2019-04-16
Payer: MEDICARE

## 2019-04-16 ENCOUNTER — TELEPHONE (OUTPATIENT)
Dept: ORTHOPEDIC SURGERY | Age: 64
End: 2019-04-16

## 2019-04-16 PROCEDURE — 97162 PT EVAL MOD COMPLEX 30 MIN: CPT

## 2019-04-16 PROCEDURE — 97110 THERAPEUTIC EXERCISES: CPT

## 2019-04-16 PROCEDURE — 97140 MANUAL THERAPY 1/> REGIONS: CPT

## 2019-04-16 NOTE — TELEPHONE ENCOUNTER
Isabel Forrester from [facility]   physical therapy  cliniccalled asking what is the  protocol for the patient's rotator cuff? Pls adv [phone].

## 2019-04-16 NOTE — PROGRESS NOTES
PT DAILY TREATMENT NOTE/SHOULDER EVAL 10-18    Patient Name: George Pichardo  Date:2019  : 1955  [x]  Patient  Verified  Payor: Mya Rodriguez / Plan: [place] MEDICARE PART A & B / Product Type: Medicare /    In time:1210  Out time:100  Total Treatment Time (min): 50  Visit #: 1 of -    Medicare/BCBS Only   Total Timed Codes (min):  25 1:1 Treatment Time:  25       Treatment Area: Other specified postprocedural states [Z98.890]  Presence of left artificial knee joint [Z96.652]    SUBJECTIVE  Pain Level (0-10 scale): 3/10  []constant []intermittent []improving []worsening []no change since onset    Any medication changes, allergies to medications, adverse drug reactions, diagnosis change, or new procedure performed?: [x] No    [] Yes (see summary sheet for update)  Subjective functional status/changes:       Work Hx:   Living Situation: lives with family   Pt Goals: \" restore mobility and go back to work\"      2019 right RCR, was suppose to start two weeks ago but was booked up   Was given exercises broom stick ER and AAROM flexion    Has been 3 weeks out of the sling, has been slowly using arm   Doing HEP with left knee TKA; 2018 did PT here, went to work found out he had prostate cancer, urgent eye surgery and prostate sx    OBJECTIVE/EXAMINATION        25 min [x]Eval                  []Re-Eval       15 min Therapeutic Exercise:  [] See flow sheet : educated on POC, HEP, surgical precautions    Rationale: increase ROM and increase strength to improve the patients ability to complete ADls with ease. 10 min Manual Therapy:  PROM with oscillations flexion, abduction, ER, scaption     Rationale: decrease pain, increase ROM and increase tissue extensibility to restore mobility of right shoulder.              With   [] TE   [] TA   [] neuro   [] other: Patient Education: [x] Review HEP    [] Progressed/Changed HEP based on:   [] positioning   [] body mechanics   [] transfers   [] heat/ice application    [] other:      Other Objective/Functional Measures:     Physical Therapy Evaluation - Shoulder    Posture: [] Poor    [x] Fair    [] Good    Describe: rounded shoulders, forward head    ROM:  [] Unable to assess at this time                                           AROM                                                              PROM   Left Right  Left Right   Flexion 115  Flexion 130 80   Extension   Extension     Scaption/ABD   Scaptin/ABD  70   ER @ 0 Degrees   ER @ 0 Degrees  25   ER @ 90 Degrees   ER @ 90 Degrees     IR @ 90 Degrees   IR @ 0 Degrees  35     End Feel / Painful Arc:    Strength:   [] Unable to assess at this time                                                                            L (1-5) R (1-5) Pain   Flexors   [] Yes   [] No   Abductors   [] Yes   [] No   External Rotators   [] Yes   [] No   Internal Rotators   [] Yes   [] No   Supraspinatus   [] Yes   [] No   Serratus Anterior   [] Yes   [] No   Lower Trapezius   [] Yes   [] No   Elbow Flexion   [] Yes   [] No   Elbow Extension   [] Yes   [] No       Scapulohumoral Control / Rhythm:  Able to eccentrically lower with good control?  Left: [] Yes   [] No     Right: [] Yes   [] No    Accessory Motions:    Palpation  [] Min  [x] Mod  [] Severe    Location: left joint line  [] Min  [] Mod  [] Severe    Location:  [] Min  [] Mod  [] Severe    Location:    Optional Tests:    Sensation Left Right Reflexes Left Right   Biceps (C5)   Biceps (C5)     Alvarado Radial(C6-7)   Brachioradialis (C6)     Alvarado Ulnar(C8-T1)   Triceps (C7)       Adson's Test  [] Pos   [] Neg Yergason's Test [] Pos   [] Neg  Mary's Test  [] Pos   [] Neg Sidney's Sign [] Pos   [] Neg  Neer's Test  [] Pos   [] Neg Clunk Test  [] Pos   [] Neg  Hawkin's Test  [] Pos   [] Neg AC Joint  [] Pos   [] Neg  Speed's Test  [] Pos   [] Neg SC Joint  [] Pos   [] Neg  Empty Can  [] Pos   [] Neg Pectoral Tightness [] Pos   [] Neg  Anterior Apprehension [] Pos   [] Neg   Posterior Apprehension [] Pos   [] Neg      Other Tests / Comments:   Educated pt to not use right UE to push/pull doors or to push up from chair   Decreased left knee strength      Pain Level (0-10 scale) post treatment: 3/10    ASSESSMENT/Changes in Function: see POC    Patient will continue to benefit from skilled PT services to modify and progress therapeutic interventions, address functional mobility deficits, address ROM deficits, address strength deficits, analyze and address soft tissue restrictions, analyze and cue movement patterns and instruct in home and community integration to attain remaining goals.      [x]  See Plan of Care  []  See progress note/recertification  []  See Discharge Summary         Progress towards goals / Updated goals:  See POC    PLAN  []  Upgrade activities as tolerated     [x]  Continue plan of care  []  Update interventions per flow sheet       []  Discharge due to:_  []  Other:_      Francis Bahena, PT 4/16/2019  12:11 PM

## 2019-04-16 NOTE — PROGRESS NOTES
[facility] [place] [employer] [facility]  [address] [facility]  [phone] fax    Plan of Care/ Statement of Necessity for Physical Therapy Services    Patient name: Helder Toribio Start of Care: 2019   Referral source: Marie Lockett MD : 1955    Medical Diagnosis: Other specified postprocedural states [Z98.890]  Presence of left artificial knee joint [Z96.652]  Payor: VA MEDICARE / Plan: VA MEDICARE PART A & B / Product Type: Medicare /  Onset Date:3/29/2019    Treatment Diagnosis: right shoulder pain and left knee pain   Prior Hospitalization: see medical history Provider#: [id number]   Medications: Verified on Patient summary List    Comorbidities: arthritis, high blood pressure, prostate ca s/p sx, left TKA   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 Assessment/ key information: Patient is a 75year-old male who presents with chief compliant of right shoulder and left knee pain s/p right rotator cuff repair on 2019 and left TKA 2018. Patient reports he completed outpatient PT initially following TKA and was able to return to work and restore mobility. Pt shortley after was diagnosed with prostate cancer, then had prostate sx and emergency eye surgery. Due to surgeries, pt spend a period of time immobile leading to regression in left knee stability. Patient reports he has been out of the sling for about 3 weeks and had been doing AAROM shoulder flexion and wand ER as told at last surgical follow up. Patient presents with significantly limited right shoulder PROM to ~80 deg flexion with firm end feel, 25 deg ER and 35 deg IR in neutral, and 70 deg abduction.  Patient presents with limited AROM in left shoulder due to history of rotator cuff injury without surgical intervention; 115 deg flexion and 130 deg PROM. He is TTP at left knee joint line and decreased gross strength. Patient will benefit from skilled PT initially focusing on right shoulder ROM and strength to restore mobility and PLOF. Evaluation Complexity History HIGH Complexity :3+ comorbidities / personal factors will impact the outcome/ POC ; Examination MEDIUM Complexity : 3 Standardized tests and measures addressing body structure, function, activity limitation and / or participation in recreation  ;Presentation MEDIUM Complexity : Evolving with changing characteristics  ; Clinical Decision Making MEDIUM Complexity : FOTO score of 26-74  Overall Complexity Rating: MEDIUM  Problem List: pain affecting function, decrease ROM, decrease strength, edema affecting function, impaired gait/ balance, decrease ADL/ functional abilitiies, decrease activity tolerance and decrease flexibility/ joint mobility   Treatment Plan may include any combination of the following: Therapeutic exercise, Therapeutic activities, Neuromuscular re-education, Physical agent/modality, Gait/balance training, Manual therapy, Patient education, Self Care training, Functional mobility training and Home safety training  Patient / Family readiness to learn indicated by: trying to perform skills  Persons(s) to be included in education: patient (P)  Barriers to Learning/Limitations: None  Patient Goal (s): restore mobility and return to work   Patient Self Reported Health Status: fair  Rehabilitation Potential: good    Short Term Goals: To be accomplished in 1 weeks:  1. Patient will be compliant with HEP to improve ROM and improve therapy outcomes. Long Term Goals: To be accomplished in 8 weeks:  1. Patient will increase PROM of right shoulder in all planes by 30 degrees to restore mobility for ease dressing. 2. Pt will achieve 110 deg AAROM flexion to restore over head mobility for reaching.    3. Patient will increase right shoulder strength to 4/5 to improve ease of daily tasks. 4. Patient will increase FOTO score by 22 pts, 66/100, to show improved functional mobility and QOL. Frequency / Duration: Patient to be seen 2-3 times per week for 8 weeks. Patient/ Caregiver education and instruction: Diagnosis, prognosis, exercises   [x]  Plan of care has been reviewed with PTA    Certification Period: 4/16/2019 to 6/13/2019  Angelaluisterence Mendezes, PT 4/16/2019 1:31 PM    ________________________________________________________________________    I certify that the above Therapy Services are being furnished while the patient is under my care. I agree with the treatment plan and certify that this therapy is necessary.     Physician's Signature:____________Date:_________TIME:________    ** Signature, Date and Time must be completed for valid certification **    Please sign and return to [facility] [employer]  [address] [place] [address]  [phone] fax

## 2019-04-18 ENCOUNTER — HOSPITAL ENCOUNTER (OUTPATIENT)
Dept: PHYSICAL THERAPY | Age: 64
Discharge: HOME | End: 2019-04-18
Payer: MEDICARE

## 2019-04-18 PROCEDURE — 97140 MANUAL THERAPY 1/> REGIONS: CPT

## 2019-04-18 PROCEDURE — 97110 THERAPEUTIC EXERCISES: CPT

## 2019-04-18 PROCEDURE — 97016 VASOPNEUMATIC DEVICE THERAPY: CPT

## 2019-04-18 RX ORDER — LISINOPRIL AND HYDROCHLOROTHIAZIDE 12.5; 2 MG/1; MG/1
TABLET ORAL
Qty: 90 TAB | Refills: 0 | Status: SHIPPED | OUTPATIENT
Start: 2019-04-18 | End: 2019-07-23 | Stop reason: SDUPTHER

## 2019-04-18 NOTE — TELEPHONE ENCOUNTER
Patient was advised of denial information below. He states he has been on Coumadin the entire time this medication has been prescribed and wishes to have refill. He spoke to [facility] [employer] and they can fill for patient, however, we have to call them to authorize. Please review again for consideration and advise pharmacy or patient back.   Maxim Ornelas, [phone]  Patient, [phone]

## 2019-04-18 NOTE — PROGRESS NOTES
PT DAILY TREATMENT NOTE 10-18    Patient Name: Savana Pino  Date:2019  : 1955  [x]  Patient  Verified  Payor: VA MEDICARE / Plan: VA MEDICARE PART A & B / Product Type: Medicare /    In time:4:05  Out time:4:50  Total Treatment Time (min): 45  Visit #: 2 of     Medicare/BCBS Only   Total Timed Codes (min):  35 1:1 Treatment Time:  35       Treatment Area: Left knee pain [M25.562]  Pain in right shoulder [M25.511]    SUBJECTIVE  Pain Level (0-10 scale): right shoulder: 2-3/10, Knee: 3-4/10   Any medication changes, allergies to medications, adverse drug reactions, diagnosis change, or new procedure performed?: [x] No    [] Yes (see summary sheet for update)  Subjective functional status/changes:   [] No changes reported  Patient stated that he thinks the pendulums from the home exercise program have been helping. OBJECTIVE    Modality rationale: decrease edema, decrease inflammation and decrease pain to improve the patients ability to perform ADLs.     Min Type Additional Details    [] Estim:  []Unatt       []IFC  []Premod                        []Other:  []w/ice   []w/heat  Position:  Location:    [] Estim: []Att    []TENS instruct  []NMES                    []Other:  []w/US   []w/ice   []w/heat  Position:  Location:    []  Traction: [] Cervical       []Lumbar                       [] Prone          []Supine                       []Intermittent   []Continuous Lbs:  [] before manual  [] after manual    []  Ultrasound: []Continuous   [] Pulsed                           []1MHz   []3MHz W/cm2:  Location:    []  Iontophoresis with dexamethasone         Location: [] Take home patch   [] In clinic    []  Ice     []  heat  []  Ice massage  []  Laser   []  Anodyne Position:  Location:    []  Laser with stim  []  Other:  Position:  Location:   10 [x]  Vasopneumatic Device Pressure:       [x] lo [] med [] hi   Temperature: [] lo [x] med [] hi   [] Skin assessment post-treatment:  []intact []redness- no adverse reaction    []redness - adverse reaction:     20 min Therapeutic Exercise:  [x] See flow sheet :   Rationale: increase ROM to improve the patients ability to perform ADls. 15 min Manual Therapy:  Gentle Scap mobs, STM to right UT and along vertebral border of scap; PROM of right shoulder into flex and scaption    Rationale: decrease pain, increase ROM and increase tissue extensibility to increase ease with ADls. With   [] TE   [] TA   [] neuro   [] other: Patient Education: [x] Review HEP    [] Progressed/Changed HEP based on:   [] positioning   [] body mechanics   [] transfers   [] heat/ice application    [] other:      Other Objective/Functional Measures: Initiated exercises per flow sheet. Pain Level (0-10 scale) post treatment: 1/10     ASSESSMENT/Changes in Function: Patient exhibits increased tightness and a firm end feel into flexion and scaption. Therapist did not apply overpressure and stopped progression of PROM once patient reported discomfort. Patient reported a reduction in pain at end of the session. Patient will continue to benefit from skilled PT services to modify and progress therapeutic interventions, address functional mobility deficits, address ROM deficits, address strength deficits, analyze and address soft tissue restrictions, analyze and cue movement patterns and assess and modify postural abnormalities to attain remaining goals. []  See Plan of Care  []  See progress note/recertification  []  See Discharge Summary         Progress towards goals / Updated goals:  Short Term Goals: To be accomplished in 1 weeks:  1. Patient will be compliant with HEP to improve ROM and improve therapy outcomes. MET per patient (4/18/19)      Long Term Goals: To be accomplished in 8 weeks:  1. Patient will increase PROM of right shoulder in all planes by 30 degrees to restore mobility for ease dressing.    2. Pt will achieve 110 deg AAROM flexion to restore over head mobility for reaching. 3. Patient will increase right shoulder strength to 4/5 to improve ease of daily tasks. 4. Patient will increase FOTO score by 22 pts, 66/100, to show improved functional mobility and QOL.     PLAN  []  Upgrade activities as tolerated     [x]  Continue plan of care  []  Update interventions per flow sheet       []  Discharge due to:_  []  Other:_      Kiera David, PT 4/18/2019  5:26 PM    Future Appointments   Date Time Provider [place]   4/22/2019 12:30 PM Juan A Bray, PTA [facility]   4/24/2019  9:30 AM Sid Mitchell, PTA [facility]   4/24/2019  1:30 PM [facility] DAVID [facility] ROXANNE SCHED   4/25/2019 10:00 AM [facility]   4/26/2019  9:20 AM Marcine Duane, MD [facility] 69   4/26/2019  1:30 PM Andres Douglass, PTA [facility]   4/30/2019  2:00 PM Andres Douglass, PTA [facility]   5/1/2019 12:30 PM Karenann Seip, PT [facility]   5/3/2019 11:15 AM Yasmeen Lewis MD [facility] ROXANNE SCHED   5/3/2019  2:30 PM Andres Douglass, PTA [facility]   5/6/2019 12:30 PM Karenann Seip, PT [id number] [facility]   5/8/2019  2:00 PM Andres Douglass, PTA [facility]   5/10/2019  1:30 PM Andres Douglass, PTA [facility]   5/14/2019  2:30 PM Karenann Seip, PT [id number] [facility]   5/17/2019 10:30 AM Karenann Seip, PT [id number] [facility]   5/20/2019  1:00 PM Andres Douglass, PTA [facility]   5/22/2019 12:00 PM Karenann Seip, PT [id number] [facility]   5/24/2019  1:30 PM Andres Douglass, PTA [facility]   5/28/2019  1:30 PM Andres Douglass, PTA [facility]   5/30/2019  1:00 PM Andres Douglass, PTA [facility]   5/31/2019 11:00 AM Karenann Seip, PT [facility]   6/3/2019  1:00 PM Karenann Seip, PT [facility]   6/5/2019 11:00 AM Andres Douglass, PTA [facility]   6/7/2019  1:00 PM Andres Douglass, PTA [facility]   6/26/2019 11:00 AM [facility] NURSE [facility] ROXANNE SCHED   7/22/2019  8:30 AM Trish Hurst  [id number]  10/2/2019  1:00 PM Given, Kevin Reis MD [address]

## 2019-04-19 ENCOUNTER — DOCUMENTATION ONLY (OUTPATIENT)
Dept: ORTHOPEDIC SURGERY | Facility: CLINIC | Age: 64
End: 2019-04-19

## 2019-04-19 NOTE — TELEPHONE ENCOUNTER
04/19/2019 Patient and wife have been waiting for call from office about the auth being sent to ins so [facility] can fill the Voltaren Gel.   Please as checked with the pharmacy and they are waiting to hear   Please call ins to get auth and then please call patient back at [phone]

## 2019-04-19 NOTE — TELEPHONE ENCOUNTER
Left message for patient that if he wants an Rx for voltaren, he will need to wait until Monday when CARA Stevens is back in office.

## 2019-04-19 NOTE — PROGRESS NOTES
Completed forms completed and faxed to insurance copy; a copy of the forms have been placed to be scanned. Left message for patient with this update.

## 2019-04-20 NOTE — PROGRESS NOTES
The patient presents to the office today with the chief complaint of bilateral LE swelling    HPI    The patient is status post shoulder surgery. He is recovering well. The patient complains swelling of both legs. The edema decreases with the legs elevated. The patient has a history of DVT. He remains on Coumadin - the protime is slowly climbing post op. The patient has aortic insufficiency. He denies dyspnea. Review of Systems   Respiratory: Negative for shortness of breath. Cardiovascular: Positive for leg swelling. Negative for chest pain. Allergies   Allergen Reactions    Amoxicillin Itching    Augmentin [Amoxicillin-Pot Clavulanate] Itching    Chlorhexidine Towelette Itching    Hibiclens [Chlorhexidine Gluconate] Itching    Milk Containing Products Diarrhea    Penicillins Rash    Requip [Ropinirole] Nausea and Vomiting       Current Outpatient Medications   Medication Sig Dispense Refill    lisinopril-hydroCHLOROthiazide (PRINZIDE, ZESTORETIC) 20-12.5 mg per tablet TAKE 1 TABLET BY MOUTH DAILY 90 Tab 0    celecoxib (CELEBREX) 50 mg capsule Take 50 mg by mouth two (2) times a day.  furosemide (LASIX) 20 mg tablet 1 tab each AM 30 Tab 1    warfarin (COUMADIN) 5 mg tablet TAKE 2 AND 1/2 TABLETS BY MOUTH DAILY OR AS DIRECTED 75 Tab 0    labetalol (NORMODYNE) 100 mg tablet TAKE ONE TABLET BY MOUTH TWICE DAILY 180 Tab 0    metaxalone (SKELAXIN) 800 mg tablet Take 1 Tab by mouth three (3) times daily. Indications: muscle spasm 90 Tab 2    furosemide (LASIX PO) Take  by mouth.  butalbital-acetaminophen-caff (FIORICET) -40 mg per capsule Take 1 Cap by mouth every eight (8) hours as needed for Pain. 90 Cap 1    diclofenac (VOLTAREN) 1 % gel Apply 4 g to affected area four (4) times daily. Maximum 16 grams per joint per day.  Dispense 5 100 gram tubes 5 Each 0    raNITIdine (ZANTAC) 150 mg tablet TK 1 T PO HS  1    ALPRAZolam (XANAX) 0.5 mg tablet Take one half(1/2) tab to one(1) tab by mouth at bedtime as needed for sleep 30 Tab 0    lansoprazole (PREVACID) 30 mg capsule TAKE 1 CAPSULE DAILY BEFOREBREAKFAST 90 Cap 3    warfarin (COUMADIN) 5 mg tablet TAKE 2 AND 1/2 TABLETS BY MOUTH DAILY OR AS DIRECTED 75 Tab 0    warfarin (COUMADIN) 3 mg tablet Or as directed 30 Tab 3    montelukast (SINGULAIR) 10 mg tablet TAKE 1 TABLET BY MOUTH EVERY DAY (Patient taking differently: TAKE 1 TABLET BY MOUTH EVERY HS) 90 Tab 0    acetaminophen (TYLENOL) 500 mg tablet Take 1,000 mg by mouth every six (6) hours as needed for Pain.          Past Medical History:   Diagnosis Date    Arthritis     Bleeding     Chronic pain     knee and shoulder    GERD (gastroesophageal reflux disease)     Headache(784.0)     migraine    High cholesterol     Hypertension     Lower back pain 11/6/2010    Other chest pain     Pure hypercholesterolemia     Right buttock pain 11/6/2010    Right foot pain     Rotator cuff tear     left-since 2010, worsened tear Jan.    Rotator cuff tear, right     Spinal stenosis     Tendonitis, tibialis     anterior    Thromboembolus ([address]     3 after sx last one 2000       Past Surgical History:   Procedure Laterality Date    FOOT/TOES SURGERY PROC UNLISTED      HX BACK SURGERY      HX HEENT Right 09/2018    eye surgery, macular     HX KNEE REPLACEMENT Left     HX ORTHOPAEDIC  06-25-12    Right foot with excision of bursa and adipose tissue from fifth metatarsal base by Dr. Rivka Johnson      lower back (1992 and 2000)    HX OTHER SURGICAL      left foot (2008)    HX OTHER SURGICAL      Retina repair     HX PROSTATECTOMY  11/2018    HX ROTATOR CUFF REPAIR Right 01/28/2019    by Dr. Divina Dial History     Socioeconomic History    Marital status:      Spouse name: Not on file    Number of children: Not on file    Years of education: Not on file    Highest education level: Not on file Occupational History    Not on file   Social Needs    Financial resource strain: Not on file    Food insecurity:     Worry: Not on file     Inability: Not on file    Transportation needs:     Medical: Not on file     Non-medical: Not on file   Tobacco Use    Smoking status: Never Smoker    Smokeless tobacco: Never Used   Substance and Sexual Activity    Alcohol use: No    Drug use: No    Sexual activity: Not Currently   Lifestyle    Physical activity:     Days per week: Not on file     Minutes per session: Not on file    Stress: Not on file   Relationships    Social connections:     Talks on phone: Not on file     Gets together: Not on file     Attends Rastafari service: Not on file     Active member of club or organization: Not on file     Attends meetings of clubs or organizations: Not on file     Relationship status: Not on file    Intimate partner violence:     Fear of current or ex partner: Not on file     Emotionally abused: Not on file     Physically abused: Not on file     Forced sexual activity: Not on file   Other Topics Concern     Service Not Asked    Blood Transfusions Not Asked    Caffeine Concern Not Asked    Occupational Exposure Not Asked   Jasmin Natalya Hazards Not Asked    Sleep Concern Not Asked    Stress Concern Not Asked    Weight Concern Not Asked    Special Diet Not Asked    Back Care Not Asked    Exercise Not Asked    Bike Helmet Not Asked   [address] Not Asked    Self-Exams Not Asked   Social History Narrative    Not on file       Patient does not have an advanced directive on file    Visit Vitals  /82   Pulse 77   Temp 98 °F (36.7 °C) (Tympanic)   Resp 16   Ht 5' 10\" (1.778 m)   Wt 233 lb (105.7 kg)   SpO2 99%   BMI 33.43 kg/m²       Physical Exam   Cardiovascular: Normal rate and regular rhythm. Exam reveals no gallop. No murmur heard. Pulmonary/Chest: He has no wheezes. He has no rales. Abdominal: Soft. He exhibits no distension.  There is no tenderness. Musculoskeletal: He exhibits edema (1+ bilateral LE edema).          Clinical Support on 03/25/2019   Component Date Value Ref Range Status    VALID INTERNAL CONTROL POC 03/25/2019 Yes   Final    INR POC 03/25/2019 1.7   Final   Office Visit on 03/22/2019   Component Date Value Ref Range Status    Prostate Specific Ag 03/22/2019 <0.03   0 - 4 ng/mL Final    Comment: (Methodology: Roche ECLIA)          Glucose (UA POC) 03/22/2019 Negative  Negative Final    Bilirubin (UA POC) 03/22/2019 Negative  Negative Final    Ketones (UA POC) 03/22/2019 Negative  Negative Final    Specific gravity (UA POC) 03/22/2019 1.015  1.001 - 1.035 Final    Blood (UA POC) 03/22/2019 Trace  Negative Final    pH (UA POC) 03/22/2019 6.0  4.6 - 8.0 Final    Protein (UA POC) 03/22/2019 Negative  Negative Final    Urobilinogen (UA POC) 03/22/2019 0.2 mg/dL  0.2 - 1 Final    Nitrites (UA POC) 03/22/2019 Negative  Negative Final    Leukocyte esterase (UA POC) 03/22/2019 Negative  Negative Final   Office Visit on 03/06/2019   Component Date Value Ref Range Status    VALID INTERNAL CONTROL POC 03/06/2019 Yes   Final    INR POC 03/06/2019 1.5   Final   Admission on 01/28/2019, Discharged on 01/28/2019   Component Date Value Ref Range Status    Prothrombin time 01/28/2019 13.5  11.5 - 15.2 sec Final    INR 01/28/2019 1.1  0.8 - 1.2   Final    Comment:            INR Therapeutic Ranges         (on stable oral anticoagulant):     INDICATION                INR  DVT/PE/Atrial Fib          2.0-3.0  MI/Mechanical Heart Valve  2.5-3.5     Hospital Outpatient Visit on 01/18/2019   Component Date Value Ref Range Status    Sodium 01/18/2019 141  136 - 145 mmol/L Final    Potassium 01/18/2019 4.4  3.5 - 5.5 mmol/L Final    Chloride 01/18/2019 105  100 - 108 mmol/L Final    CO2 01/18/2019 32  21 - 32 mmol/L Final    Anion gap 01/18/2019 4  3.0 - 18 mmol/L Final    Glucose 01/18/2019 88  74 - 99 mg/dL Final    BUN 01/18/2019 14 7.0 - 18 MG/DL Final    Creatinine 01/18/2019 0.90  0.6 - 1.3 MG/DL Final    BUN/Creatinine ratio 01/18/2019 16  12 - 20   Final    GFR est AA 01/18/2019 >60  >60 ml/min/1.73m2 Final    GFR est non-AA 01/18/2019 >60  >60 ml/min/1.73m2 Final    Comment: (NOTE)  Estimated GFR is calculated using the Modification of Diet in Renal   Disease (MDRD) Study equation, reported for both  Americans   (GFRAA) and non- Americans (GFRNA), and normalized to 1.73m2   body surface area. The physician must decide which value applies to   the patient. The MDRD study equation should only be used in   individuals age 25 or older. It has not been validated for the   following: pregnant women, patients with serious comorbid conditions,   or on certain medications, or persons with extremes of body size,   muscle mass, or nutritional status.  Calcium 01/18/2019 9.1  8.5 - 10.1 MG/DL Final    Bilirubin, total 01/18/2019 0.3  0.2 - 1.0 MG/DL Final    ALT (SGPT) 01/18/2019 26  16 - 61 U/L Final    AST (SGOT) 01/18/2019 19  15 - 37 U/L Final    Alk.  phosphatase 01/18/2019 70  45 - 117 U/L Final    Protein, total 01/18/2019 6.3* 6.4 - 8.2 g/dL Final    Albumin 01/18/2019 3.7  3.4 - 5.0 g/dL Final    Globulin 01/18/2019 2.6  2.0 - 4.0 g/dL Final    A-G Ratio 01/18/2019 1.4  0.8 - 1.7   Final    WBC 01/18/2019 5.9  4.6 - 13.2 K/uL Final    RBC 01/18/2019 4.36* 4.70 - 5.50 M/uL Final    HGB 01/18/2019 13.8  13.0 - 16.0 g/dL Final    HCT 01/18/2019 41.3  36.0 - 48.0 % Final    MCV 01/18/2019 94.7  74.0 - 97.0 FL Final    MCH 01/18/2019 31.7  24.0 - 34.0 PG Final    MCHC 01/18/2019 33.4  31.0 - 37.0 g/dL Final    RDW 01/18/2019 12.6  11.6 - 14.5 % Final    PLATELET 25/45/8821 275  135 - 420 K/uL Final    MPV 01/18/2019 10.5  9.2 - 11.8 FL Final    NEUTROPHILS 01/18/2019 58  40 - 73 % Final    LYMPHOCYTES 01/18/2019 24  21 - 52 % Final    MONOCYTES 01/18/2019 15* 3 - 10 % Final    EOSINOPHILS 01/18/2019 2 0 - 5 % Final    BASOPHILS 01/18/2019 1  0 - 2 % Final    ABS. NEUTROPHILS 01/18/2019 3.4  1.8 - 8.0 K/UL Final    ABS. LYMPHOCYTES 01/18/2019 1.4  0.9 - 3.6 K/UL Final    ABS. MONOCYTES 01/18/2019 0.9  0.05 - 1.2 K/UL Final    ABS. EOSINOPHILS 01/18/2019 0.1  0.0 - 0.4 K/UL Final    ABS. BASOPHILS 01/18/2019 0.1  0.0 - 0.1 K/UL Final    DF 01/18/2019 AUTOMATED    Final    aPTT 01/18/2019 33.7  23.0 - 36.4 SEC Final    Prothrombin time 01/18/2019 25.2* 11.5 - 15.2 sec Final    INR 01/18/2019 2.3* 0.8 - 1.2   Final    Comment:            INR Therapeutic Ranges         (on stable oral anticoagulant):     INDICATION                INR  DVT/PE/Atrial Fib          2.0-3.0  MI/Mechanical Heart Valve  2.5-3.5     Office Visit on 01/18/2019   Component Date Value Ref Range Status    VALID INTERNAL CONTROL POC 01/18/2019 Yes   Final    INR POC 01/18/2019 2.4   Final       .No results found for any visits on 04/12/19. Assessment / Plan      ICD-10-CM ICD-9-CM    1. Bilateral leg edema R60.0 782.3 ECHO ADULT COMPLETE   2. Personal history of venous thrombosis and embolism Z86.718 V12.51    3. Warfarin anticoagulation Z79.01 V58.61    4. [facility] C61 185        Lasix for the swelling  he was advised to continue his maintenance medications - Coumadin at the same dose        Follow-up and Dispositions    · Return in about 3 weeks (around 5/3/2019). I asked Onofre Leong if he has any questions and I answered the questions.   Onofre Leong states that he understands the treatment plan and agrees with the treatment plan

## 2019-04-22 ENCOUNTER — HOSPITAL ENCOUNTER (OUTPATIENT)
Dept: PHYSICAL THERAPY | Age: 64
Discharge: HOME | End: 2019-04-22
Payer: MEDICARE

## 2019-04-22 LAB
INR BLD: 1.7
PT POC: NORMAL SECONDS
VALID INTERNAL CONTROL?: YES

## 2019-04-22 PROCEDURE — 97140 MANUAL THERAPY 1/> REGIONS: CPT

## 2019-04-22 PROCEDURE — 97016 VASOPNEUMATIC DEVICE THERAPY: CPT

## 2019-04-22 PROCEDURE — 97110 THERAPEUTIC EXERCISES: CPT

## 2019-04-22 NOTE — PROGRESS NOTES
PT DAILY TREATMENT NOTE 10-18    Patient Name: Eliazar Sullivan  Date:2019  : 1955  [x]  Patient  Verified  [place]or: [place] MED[place] / Plan: [place] MEDICARE PART A & B / Product Type: Medicare /    In time: 12:33 Out time:1:30  Total Treatment Time (min): 62  Visit #: 3 of -    Medicare/BCBS Only   Total Timed Codes (min):  42 1:1 Treatment Time:  37       Treatment Area: Pain in left knee [M25.562]  Pain in right shoulder [M25.511]    SUBJECTIVE  Pain Level (0-10 scale): 2/10 right shoulder; 3-4/10 knee  Any medication changes, allergies to medications, adverse drug reactions, diagnosis change, or new procedure performed?: [x] No    [] Yes (see summary sheet for update)  Subjective functional status/changes:   [] No changes reported  Pt reports his right shoulder is tight but not too much pain. He notes more left knee pain especially standing up from chairs along the inner side of his knee. He wants to get back to work. He is doing his exercises at home. He gets some vertigo with rolling and getting up/down out of bed.      OBJECTIVE    Modality rationale: decrease inflammation and decrease pain to improve the patients ability to return to work   Min Type Additional Details    [] Estim:  []Unatt       []IFC  []Premod                        []Other:  []w/ice   []w/heat  Position:  Location:    [] Estim: []Att    []TENS instruct  []NMES                    []Other:  []w/US   []w/ice   []w/heat  Position:  Location:    []  Traction: [] Cervical       []Lumbar                       [] Prone          []Supine                       []Intermittent   []Continuous Lbs:  [] before manual  [] after manual    []  Ultrasound: []Continuous   [] Pulsed                           []1MHz   []3MHz W/cm2:  Location:    []  Iontophoresis with dexamethasone         Location: [] Take home patch   [] In clinic    []  Ice     []  heat  []  Ice massage  []  Laser   []  Anodyne Position:  Location:    []  Laser with stim  []  Other: Position:  Location:   15 [x]  Vasopneumatic Device-right shoulder Pressure:       [x] lo [] med [] hi   Temperature: [x] lo [] med [] hi   [x] Skin assessment post-treatment:  [x]intact []redness- no adverse reaction    []redness - adverse reaction:     27 min Therapeutic Exercise:  [x] See flow sheet :   Rationale: increase ROM and increase strength to improve the patients ability to return to work    15 min Manual Therapy:  [address] posterior/inferior mobs grade III; PROM with oscillations; gentle contract relax into flexion   Rationale: decrease pain, increase ROM and increase tissue extensibility to return to full work duty          With   [] TE   [] TA   [] neuro   [] other: Patient Education: [x] Review HEP    [] Progressed/Changed HEP based on:   [] positioning   [] body mechanics   [] transfers   [] heat/ice application    [] other:      Other Objective/Functional Measures:   AAROM flexion post manual 115 degrees  MWM for the knee increased pain with sit to stands  Hip abduction for sit to stands significantly decreased pain  Educated on clams and s/l abduction to work on glute strengthening  Educated on stick flexion and ER to perform for shoulder mobility at home  Educated on posture in supine to prevent pain with shoulder motions     Pain Level (0-10 scale) post treatment: 1/10    ASSESSMENT/Changes in Function: Pt progressing with reduced pain. He has capsular tightness in the right shoulder that improves with stretching. He has decreased glute strength contributing to a valgus collapse during sit to stands causing pain. Will work to improve shoulder mobility and hip strength for return to work and progression for ADLs. Short Term Goals: To be accomplished in 1 weeks:  1. Patient will be compliant with HEP to improve ROM and improve therapy outcomes. MET per patient (4/18/19)      Long Term Goals: To be accomplished in 8 weeks:  1.  Patient will increase PROM of right shoulder in all planes by 30 degrees to restore mobility for ease dressing.   2. Pt will achieve 110 deg AAROM flexion to restore over head mobility for reaching. progressing 115 after manual (4/22/19)  3. Patient will increase right shoulder strength to 4/5 to improve ease of daily tasks. 4. Patient will increase FOTO score by 22 pts, 66/100, to show improved functional mobility and QOL.     PLAN  [x]  Upgrade activities as tolerated     [x]  Continue plan of care  []  Update interventions per flow sheet       []  Discharge due to:_  []  Other:_      Yu Castellanos, BIRGIT 4/22/2019  11:25 AM    Future Appointments   Date Time Provider [place]   4/22/2019 12:30 PM Elfredia Danisha, PTA [facility]   4/24/2019  9:30 AM Elemigdio Raman, PTA [facility]   4/24/2019  1:30 PM [facility] ROXANNE SCHED   4/25/2019 10:00 AM [facility] S[facility]   4/26/2019  9:20 AM Nicolas Mccormick MD [facility] [address]   4/26/2019  1:30 PM Geofm Me, PTA [facility]   4/30/2019  2:00 PM Geofm Me, PTA [facility]   5/1/2019 12:30 PM Eber Nguyen, PT [facility]   5/3/2019 11:15 AM José Miguel Villegas MD [facility] ROXANNE SCHED   5/3/2019  2:30 PM Geofm Me, PTA [facility]   5/6/2019 12:30 PM Eber Nguyen, PT [facility]   5/8/2019  2:00 PM Geofm Me, PTA [facility]   5/10/2019  1:30 PM Geofm Me, PTA [facility]   5/14/2019  2:30 PM Eber Nguyen, PT [id number] [facility]   5/17/2019 10:30 AM Hildane Rotunda, PT [facility]   5/20/2019  1:00 PM Geofm Me, PTA [facility]   5/22/2019 12:00 PM Hildane Fabienunda, PT GBRYYYX [facility]   5/24/2019  1:30 PM Geofm Me, PTA [facility]   5/28/2019  1:30 PM Geofm Me, PTA [facility]   5/30/2019  1:00 PM Geofm Me, PTA [facility]   5/31/2019 11:00 AM Eber Connunda, PT [facility]   6/3/2019  1:00 PM Eber Haa, PT [facility]   6/5/2019 11:00 AM Geofm Me, PTA [facility]   6/7/2019  1:00 PM Geofm Me, PTA [id number] [facility]   6/26/2019 11:00 AM [facility] NURSE [facility]   7/22/2019  8:30 AM Damian Arguello  [id number]    10/2/2019  1:00 PM Given, Uzma Gillespie MD [id number] [facility]

## 2019-04-24 ENCOUNTER — HOSPITAL ENCOUNTER (OUTPATIENT)
Dept: PHYSICAL THERAPY | Age: 64
Discharge: HOME | End: 2019-04-24
Payer: MEDICARE

## 2019-04-24 PROCEDURE — 97110 THERAPEUTIC EXERCISES: CPT

## 2019-04-24 PROCEDURE — 97016 VASOPNEUMATIC DEVICE THERAPY: CPT

## 2019-04-24 PROCEDURE — 97140 MANUAL THERAPY 1/> REGIONS: CPT

## 2019-04-24 NOTE — PROGRESS NOTES
PT DAILY TREATMENT NOTE 10-18    Patient Name: Shanon Rubio  Date:2019  : 1955  [x]  Patient  Verified  Payor: VA MEDICARE / Plan: VA MEDICARE PART A & B / Product Type: Medicare /    In time:9:30  Out time:10:30  Total Treatment Time (min): 60  Visit #: 4 of     Medicare/BCBS Only   Total Timed Codes (min):  45 1:1 Treatment Time:  45       Treatment Area: Pain in left knee [M25.562]  Pain in right shoulder [M25.511]    SUBJECTIVE  Pain Level (0-10 scale): 3/10 right shoulder and left knee  Any medication changes, allergies to medications, adverse drug reactions, diagnosis change, or new procedure performed?: [x] No    [] Yes (see summary sheet for update)  Subjective functional status/changes:   [] No changes reported  Pt reports improvement in getting out of chairs after last session; he is still limping some when walking more due to swelling in the right foot which he is following up with the MD about. He notes he was able to work on his exercises both days after last visit. He has to go back to the MD to get the correct paperwork because they entered the wrong return to work date and he isn't ready to go back to work yet due to his shoulder.      OBJECTIVE    Modality rationale: decrease inflammation and decrease pain to improve the patients ability to reach [facility] for ADLs   Min Type Additional Details    [] Estim:  []Unatt       []IFC  []Premod                        []Other:  []w/ice   []w/heat  Position:  Location:    [] Estim: []Att    []TENS instruct  []NMES                    []Other:  []w/US   []w/ice   []w/heat  Position:  Location:    []  Traction: [] Cervical       []Lumbar                       [] Prone          []Supine                       []Intermittent   []Continuous Lbs:  [] before manual  [] after manual    []  Ultrasound: []Continuous   [] Pulsed                           []1MHz   []3MHz W/cm2:  Location:    []  Iontophoresis with dexamethasone         Location: [] Take home patch   [] In clinic    []  Ice     []  heat  []  Ice massage  []  Laser   []  Anodyne Position:  Location:    []  Laser with stim  []  Other:  Position:  Location:   15 [x]  Vasopneumatic Device- right shoulder Pressure:       [x] lo [] med [] hi   Temperature: [x] lo [] med [] hi   [x] Skin assessment post-treatment:  [x]intact []redness- no adverse reaction    []redness - adverse reaction:     30 min Therapeutic Exercise:  [x] See flow sheet :   Rationale: increase ROM and increase strength to improve the patients ability to improve OH mobility for ease of completing work duties and bathing/dressing    15 min Manual Therapy:  GH posterior/inferior Grade III-IV mobs; PROM with oscillations (flexion, scaption, abduction, ER in neutral and ER/IR in 45 degrees abduction); subscapularis TPR; gentle posterior capsule MET   Rationale: decrease pain, increase ROM and increase tissue extensibility to improve OH mobility for ease of completing ADLs          With   [] TE   [] TA   [] neuro   [] other: Patient Education: [x] Review HEP    [] Progressed/Changed HEP based on:   [] positioning   [] body mechanics   [] transfers   [] heat/ice application    [] other:      Other Objective/Functional Measures:   GH hypomobility both inferior and posterior  Multiple TPs in subscapularis  AAROM flexion post manual to 125 degrees; prior to manual 100 degrees  Functional IR to right glutes; post towel IR stretch with gentle hold relax pt was able to reach to sacrum  Improved recollection of posture correction with supine AAROM wand exercises and in sitting with scap squeeze  Added stick extension, towel IR and supine posterior capsule stretch to HEP  Educated no increase in pain with exercises at home  Able to stand from standard chair with decreased valgus collapse without UE use with minor tenderness lateral left knee (likely from pronation and some TFL/ITB compensation)  Slight antalgic gait during ambulation; improved heel strike minor right foot pain but following up with MD regarding     Pain Level (0-10 scale) post treatment: 1/10    ASSESSMENT/Changes in Function: Pt progressing towards goals of improving right shoulder mobility. He has hypomobility of the [address] joint with resultant muscle tightness in surrounding areas. He has improved awareness of posture in sitting and supine at rest and during exercises. He has improved ease of sit to stands with decreased knee pain but still has glute strength deficits. Will continue to work on posture and focus of shoulder mobility for return to work duties and ease of ADLs like bathing and dressing. Short Term Goals: To be accomplished in 1 weeks:  1. Patient will be compliant with HEP to improve ROM and improve therapy outcomes. MET per patient (4/18/19)    Long Term Goals: To be accomplished in 8 weeks:  1. Patient will increase PROM of right shoulder in all planes by 30 degrees to restore mobility for ease dressing.   2. Pt will achieve 110 deg AAROM flexion to restore over head mobility for reaching. progressing 115 after manual (4/22/19)  degrees post manual (4/24/19)  3. Patient will increase right shoulder strength to 4/5 to improve ease of daily tasks. 4. Patient will increase FOTO score by 22 pts, 66/100, to show improved functional mobility and QOL.        PLAN  [x]  Upgrade activities as tolerated     [x]  Continue plan of care  []  Update interventions per flow sheet       []  Discharge due to:_  []  Other:_      Rosalva Villalobos PTA 4/24/2019  11:17 AM    Future Appointments   Date Time Provider Dex Hatfield   4/24/2019  1:30 PM [facility] R[facility]NE SCHED   4/25/2019 10:00 AM [facility] HBVNINV [facility]   4/26/2019  9:20 AM Sia Reynolds MD [address]   4/26/2019  1:30 PM Javi Courtney PTA [id number] [facility]   4/30/2019  2:00 PM Javi Courtney PTA MMCPTPB [facility]   5/1/2019 12:30 PM Jonathan Chin, PT [id number] [facility]   5/3/2019 11:15 AM Rafael Granados MD [facility]   5/3/2019  2:30 PM Andres Douglass, PTA MMCPTPB [facility]   5/6/2019 12:30 PM Karenann Seip, PT [id number] [facility]   5/8/2019  2:00 PM Andres Douglass, PTA MMCPTPB [facility]   5/10/2019  1:30 PM Andres Douglass, PTA MMCPTPB [facility]   5/14/2019  2:30 PM Karenann Seip, PT [id number] [facility]   5/17/2019 10:30 AM Karenann Seip, PT [id number] [facility]   5/20/2019  1:00 PM Andres Douglass, PTA MMCPTPB [facility]   5/22/2019 12:00 PM Karenann Seip, PT [id number] [facility]   5/24/2019  1:30 PM Andres Douglass, PTA MMCPTPB [facility]   5/28/2019  1:30 PM Andres Douglass, PTA MMCPTPB [facility]   5/30/2019  1:00 PM Andres Douglass, PTA MMCPTPB [facility]   5/31/2019 11:00 AM Karenann Seip, PT [id number] [facility]   6/3/2019  1:00 PM Karenann Seip, PT MMCPTPB [facility]   6/5/2019 11:00 AM Andres Douglass, PTA MMCPTPB [facility]   6/7/2019  1:00 PM Andres Douglass, PTA MMCPTPB [facility]   6/26/2019 11:00 AM [address]   7/22/2019  8:30 AM Trish Hurst  [address]   10/2/2019  1:00 PM Given, Husam Bourne MD [address] [facility]

## 2019-04-25 ENCOUNTER — HOSPITAL ENCOUNTER (OUTPATIENT)
Dept: NON INVASIVE DIAGNOSTICS | Age: 64
Discharge: HOME | End: 2019-04-25
Attending: INTERNAL MEDICINE
Payer: MEDICARE

## 2019-04-25 VITALS
DIASTOLIC BLOOD PRESSURE: 82 MMHG | WEIGHT: 230 LBS | SYSTOLIC BLOOD PRESSURE: 140 MMHG | BODY MASS INDEX: 32.93 KG/M2 | HEIGHT: 70 IN

## 2019-04-25 DIAGNOSIS — R60.0 BILATERAL LEG EDEMA: ICD-10-CM

## 2019-04-25 LAB
ECHO AO ROOT DIAM: 3.31 CM
ECHO LA AREA 4C: 15.6 CM2
ECHO LA VOL 2C: 44.24 ML (ref 18–58)
ECHO LA VOL 4C: 38.43 ML (ref 18–58)
ECHO LA VOL BP: 44.72 ML (ref 18–58)
ECHO LA VOL/BSA BIPLANE: 20.19 ML/M2 (ref 16–28)
ECHO LA VOLUME INDEX A2C: 19.97 ML/M2 (ref 16–28)
ECHO LA VOLUME INDEX A4C: 17.35 ML/M2 (ref 16–28)
ECHO LV E' LATERAL VELOCITY: 7.4 CM/S
ECHO LV E' SEPTAL VELOCITY: 8.29 CM/S
ECHO LV GLOBAL LONGITUDINAL STRAIN (GLS): 20.6 %
ECHO LV INTERNAL DIMENSION DIASTOLIC: 4.7 CM (ref 4.2–5.9)
ECHO LV INTERNAL DIMENSION SYSTOLIC: 3.35 CM
ECHO LV IVSD: 0.97 CM (ref 0.6–1)
ECHO LV MASS 2D: 179.6 G (ref 88–224)
ECHO LV MASS INDEX 2D: 81.1 G/M2 (ref 49–115)
ECHO LV POSTERIOR WALL DIASTOLIC: 0.95 CM (ref 0.6–1)
ECHO LVOT DIAM: 2.3 CM
ECHO LVOT PEAK GRADIENT: 4 MMHG
ECHO LVOT PEAK VELOCITY: 99.69 CM/S
ECHO LVOT VTI: 22.88 CM
ECHO MV A VELOCITY: 89.29 CM/S
ECHO MV E DECELERATION TIME (DT): 196.8 MS
ECHO MV E VELOCITY: 69.79 CM/S
ECHO MV E/A RATIO: 0.78
ECHO MV E/E' LATERAL: 9.43
ECHO MV E/E' RATIO (AVERAGED): 8.92
ECHO MV E/E' SEPTAL: 8.42
ECHO PULMONARY ARTERY SYSTOLIC PRESSURE (PASP): 25 MMHG
ECHO RV TAPSE: 2.5 CM (ref 1.5–2)
ECHO TV REGURGITANT MAX VELOCITY: 232.09 CM/S
ECHO TV REGURGITANT PEAK GRADIENT: 21.5 MMHG

## 2019-04-25 PROCEDURE — 93306 TTE W/DOPPLER COMPLETE: CPT

## 2019-04-25 NOTE — PROGRESS NOTES
Completed 2D Echocardiogram. Report to follow. Patient advised that the armband contains PHI. I cut the armband off and shred it.        Chris Butcher, RCS, RDCS

## 2019-04-26 ENCOUNTER — OFFICE VISIT (OUTPATIENT)
Dept: ORTHOPEDIC SURGERY | Age: 64
End: 2019-04-26

## 2019-04-26 ENCOUNTER — HOSPITAL ENCOUNTER (OUTPATIENT)
Dept: PHYSICAL THERAPY | Age: 64
Discharge: HOME | End: 2019-04-26
Payer: MEDICARE

## 2019-04-26 VITALS
RESPIRATION RATE: 16 BRPM | HEIGHT: 70 IN | DIASTOLIC BLOOD PRESSURE: 73 MMHG | WEIGHT: 233 LBS | HEART RATE: 77 BPM | BODY MASS INDEX: 33.36 KG/M2 | OXYGEN SATURATION: 96 % | TEMPERATURE: 97.8 F | SYSTOLIC BLOOD PRESSURE: 119 MMHG

## 2019-04-26 DIAGNOSIS — M76.32 ILIOTIBIAL BAND TENDINITIS OF LEFT SIDE: ICD-10-CM

## 2019-04-26 DIAGNOSIS — S46.011D TRAUMATIC COMPLETE TEAR OF RIGHT ROTATOR CUFF, SUBSEQUENT ENCOUNTER: Primary | ICD-10-CM

## 2019-04-26 PROCEDURE — 97140 MANUAL THERAPY 1/> REGIONS: CPT

## 2019-04-26 PROCEDURE — 97016 VASOPNEUMATIC DEVICE THERAPY: CPT

## 2019-04-26 PROCEDURE — 97110 THERAPEUTIC EXERCISES: CPT

## 2019-04-26 RX ORDER — DICLOFENAC SODIUM 10 MG/G
4 GEL TOPICAL 4 TIMES DAILY
Qty: 5 EACH | Refills: 0 | Status: SHIPPED | OUTPATIENT
Start: 2019-04-26 | End: 2020-04-01

## 2019-04-26 NOTE — TELEPHONE ENCOUNTER
Last Visit: 10/11/18 with CARA Pérez  Next Appointment: return in 6 months  Previous Refill Encounter(s): 10/11/18 #500g    Requested Prescriptions     Pending Prescriptions Disp Refills    diclofenac (VOLTAREN) 1 % gel 5 Each 0     Sig: Apply 4 g to affected area four (4) times daily. Maximum 16 grams per joint per day.  Dispense 5 100 gram tubes

## 2019-04-26 NOTE — PROGRESS NOTES
This note has been dictated below. Patient: Cherie Stephen                MRN: [id number]       SSN: [id number]  YOB: 1955        AGE: 75 y.o. SEX: male  Body mass index is 33.43 kg/m².     PCP: Lauren Cuevas MD  04/26/19    Past Medical History:   Diagnosis Date    Arthritis     Bleeding     Chronic pain     knee and shoulder    GERD (gastroesophageal reflux disease)     Headache(784.0)     migraine    High cholesterol     Hypertension     Lower back pain 11/6/2010    Other chest pain     Pure hypercholesterolemia     Right buttock pain 11/6/2010    Right foot pain     Rotator cuff tear     left-since 2010, worsened tear Jan.    Rotator cuff tear, right     Spinal stenosis     Tendonitis, tibialis     anterior    Thromboembolus ([address]     3 after sx last one 2000       Past Surgical History:   Procedure Laterality Date    FOOT/TOES SURGERY PROC UNLISTED      HX BACK SURGERY      HX HEENT Right 09/2018    eye surgery, macular     HX KNEE REPLACEMENT Left     HX ORTHOPAEDIC  06-25-12    Right foot with excision of bursa and adipose tissue from fifth metatarsal base by Dr. Villegas Senegal      lower back (1992 and 2000)    HX OTHER SURGICAL      left foot (2008)    HX OTHER SURGICAL      Retina repair     HX PROSTATECTOMY  11/2018    HX ROTATOR CUFF REPAIR Right 01/28/2019    by Dr. Kaylen Aponte         Family History   Problem Relation Age of Onset   [address] [place] [address] Arthritis-rheumatoid Mother     Dementia Mother     Heart Disease Father     Cancer Father     Hypertension Other     Cancer Brother        Social History     Socioeconomic History    Marital status:      Spouse name: Not on file    Number of children: Not on file    Years of education: Not on file    Highest education level: Not on file   Occupational History    Not on file   Social Needs    Financial resource strain: Not on file    Food insecurity: Worry: Not on file     Inability: Not on file    Transportation needs:     Medical: Not on file     Non-medical: Not on file   Tobacco Use    Smoking status: Never Smoker    Smokeless tobacco: Never Used   Substance and Sexual Activity    Alcohol use: No    Drug use: No    Sexual activity: Not Currently   Lifestyle    Physical activity:     Days per week: Not on file     Minutes per session: Not on file    Stress: Not on file   Relationships    Social connections:     Talks on phone: Not on file     Gets together: Not on file     Attends Protestant service: Not on file     Active member of club or organization: Not on file     Attends meetings of clubs or organizations: Not on file     Relationship status: Not on file    Intimate partner violence:     Fear of current or ex partner: Not on file     Emotionally abused: Not on file     Physically abused: Not on file     Forced sexual activity: Not on file   Other Topics Concern     Service Not Asked    Blood Transfusions Not Asked    Caffeine Concern Not Asked    Occupational Exposure Not Asked   Gwen Pinna Hazards Not Asked    Sleep Concern Not Asked    Stress Concern Not Asked    Weight Concern Not Asked    Special Diet Not Asked    Back Care Not Asked    Exercise Not Asked    Bike Helmet Not Asked    Seat Belt Not Asked    Self-Exams Not Asked   Social History Narrative    Not on file       Current Outpatient Medications   Medication Sig Dispense Refill    diclofenac (VOLTAREN) 1 % gel Apply 4 g to affected area four (4) times daily. Maximum 16 grams per joint per day. Dispense 5 100 gram tubes 5 Each 0    lisinopril-hydroCHLOROthiazide (PRINZIDE, ZESTORETIC) 20-12.5 mg per tablet TAKE 1 TABLET BY MOUTH DAILY 90 Tab 0    celecoxib (CELEBREX) 50 mg capsule Take 50 mg by mouth two (2) times a day.       furosemide (LASIX) 20 mg tablet 1 tab each AM 30 Tab 1    labetalol (NORMODYNE) 100 mg tablet TAKE ONE TABLET BY MOUTH TWICE DAILY 180 Tab 0    butalbital-acetaminophen-caff (FIORICET) -40 mg per capsule Take 1 Cap by mouth every eight (8) hours as needed for Pain. 90 Cap 1    raNITIdine (ZANTAC) 150 mg tablet TK 1 T PO HS  1    lansoprazole (PREVACID) 30 mg capsule TAKE 1 CAPSULE DAILY BEFOREBREAKFAST 90 Cap 3    warfarin (COUMADIN) 5 mg tablet TAKE 2 AND 1/2 TABLETS BY MOUTH DAILY OR AS DIRECTED 75 Tab 0    warfarin (COUMADIN) 3 mg tablet Or as directed 30 Tab 3    montelukast (SINGULAIR) 10 mg tablet TAKE 1 TABLET BY MOUTH EVERY DAY (Patient taking differently: TAKE 1 TABLET BY MOUTH EVERY HS) 90 Tab 0    metaxalone (SKELAXIN) 800 mg tablet Take 1 Tab by mouth three (3) times daily. Indications: muscle spasm 90 Tab 2    ALPRAZolam (XANAX) 0.5 mg tablet Take one half(1/2) tab to one(1) tab by mouth at bedtime as needed for sleep 30 Tab 0    acetaminophen (TYLENOL) 500 mg tablet Take 1,000 mg by mouth every six (6) hours as needed for Pain. Allergies   Allergen Reactions    Amoxicillin Itching    Augmentin [Amoxicillin-Pot Clavulanate] Itching    Chlorhexidine Towelette Itching    Hibiclens [Chlorhexidine Gluconate] Itching    Milk Containing Products Diarrhea    Penicillins Rash    Requip [Ropinirole] Nausea and Vomiting         REVIEW OF SYSTEMS:      Review of systems unchanged from previous visit except as noted below. PHYSICAL EXAMINATION:  Visit Vitals  /73   Pulse 77   Temp 97.8 °F (36.6 °C) (Oral)   Resp 16   Ht 5' 10\" (1.778 m)   Wt 233 lb (105.7 kg)   SpO2 96%   BMI 33.43 kg/m²     Body mass index is 33.43 kg/m². HISTORY OF PRESENT ILLNESS:  Olivia Cantor returns to the office today for his right shoulder. He is now just shy of three months from his right shoulder surgery. Overall, he is doing well. He has been in physical therapy for the past few weeks working on his range of motion. No new complaints.       PHYSICAL EXAM:  Physical exam of the right shoulder with active forward flexion to 120 degrees. Passive to 160. External rotation of 20. ASSESSMENT AND PLAN:   Gloria Armando is now three months out from his right shoulder surgery. He is progressing well. They can start with some strengthening and physical therapy now and continue to work on his stretching exercises. I will plan to see him back in about six weeks. No work at this time.                 Electronically signed by: Rebekah Mederos MD

## 2019-04-26 NOTE — PROGRESS NOTES
Mis Broussard is a 75 y.o. male (: 1955) presenting to address:    Chief Complaint   Patient presents with    Surgical Follow-up     DOS 19    Shoulder Pain       Medication list and allergies have been reviewed with Mis Broussard and updated as of today's date. I have gone over all Medical, Surgical and Social History with Mis Broussard and updated/added the information accordingly. 1. Have you been to the ER, Urgent Care or Hospitalized since your last visit? NO      2. Have you followed up with your PCP or any other Physicians since your procedure/ last office visit? YES, PCP    3 most recent PHQ Screens 2019   PHQ Not Done -   Little interest or pleasure in doing things Not at all   Feeling down, depressed, irritable, or hopeless Not at all   Total Score PHQ 2 0     Fall Risk Assessment, last 12 mths 2019   Able to walk? Yes   Fall in past 12 months?  No     VORB: No orders of the defined types were placed in this encounter.  Ankush Blankenship MD/Gerardo Ballard

## 2019-04-26 NOTE — PROGRESS NOTES
PT DAILY TREATMENT NOTE 10-18    Patient Name: Jojo Abbott  Date:2019  : 1955  [x]  Patient  Verified  Payor: [place] MEDICARE / Plan: [place] MEDICARE PART A & B / Product Type: Medicare /    In time:130  Out time:223  Total Treatment Time (min): 53  Visit #: 5 of     Medicare/BCBS Only   Total Timed Codes (min):  38 1:1 Treatment Time:  38       Treatment Area: Pain in left knee [M25.562]  Pain in right shoulder [M25.511]    SUBJECTIVE  Pain Level (0-10 scale): 4/10  Any medication changes, allergies to medications, adverse drug reactions, diagnosis change, or new procedure performed?: [x] No    [] Yes (see summary sheet for update)  Subjective functional status/changes:   [] No changes reported  Pt stated that he has more pain today and thinks it may be the weather    OBJECTIVE    Modality rationale: decrease inflammation and decrease pain to improve the patients ability to increase ease with ADLs   Min Type Additional Details    [] Estim:  []Unatt       []IFC  []Premod                        []Other:  []w/ice   []w/heat  Position:  Location:    [] Estim: []Att    []TENS instruct  []NMES                    []Other:  []w/US   []w/ice   []w/heat  Position:  Location:    []  Traction: [] Cervical       []Lumbar                       [] Prone          []Supine                       []Intermittent   []Continuous Lbs:  [] before manual  [] after manual    []  Ultrasound: []Continuous   [] Pulsed                           []1MHz   []3MHz W/cm2:  Location:    []  Iontophoresis with dexamethasone         Location: [] Take home patch   [] In clinic    []  Ice     []  heat  []  Ice massage  []  Laser   []  Anodyne Position:  Location:    []  Laser with stim  []  Other:  Position:  Location:   15 [x]  Vasopneumatic Device Pressure:       [x] lo [] med [] hi   Temperature: [x] lo [] med [] hi   [x] Skin assessment post-treatment:  [x]intact []redness- no adverse reaction    []redness - adverse reaction: 28 min Therapeutic Exercise:  [x] See flow sheet :   Rationale: increase ROM and increase strength to improve the patients ability to increase ease with ADLs    8 min Manual Therapy:  PROM with oscillations, clocks, GH and scap mobs. STM to infraspinatus   Rationale: decrease pain, increase ROM and increase tissue extensibility to increase ease with ADLs    With   [x] TE   [] TA   [] neuro   [] other: Patient Education: [x] Review HEP    [] Progressed/Changed HEP based on:   [] positioning   [] body mechanics   [] transfers   [] heat/ice application    [] other:      Other Objective/Functional Measures:   Had no complaint of increased pain during session  Has fair range of motion for flex with wand  Is pleases with across the body stretch in supine  No difficulty with sit to stands using PTB  Had trigger points in the infraspinatus which resolved with manual  Has good scapular mobility  Cont with restricted PROM for all motions     Pain Level (0-10 scale) post treatment: 1/10    ASSESSMENT/Changes in Function:   Pt is slowly progressing toward goals. PROM is improving for all motions. Pt cont to report difficulty with ADLs. Strength in right sh is slowly improving. Patient will continue to benefit from skilled PT services to modify and progress therapeutic interventions, address functional mobility deficits, address ROM deficits and address strength deficits to attain remaining goals. [x]  See Plan of Care  []  See progress note/recertification  []  See Discharge Summary         Progress towards goals / Updated goals:  Short Term Goals: To be accomplished in 1 weeks:  1. Patient will be compliant with HEP to improve ROM and improve therapy outcomes. MET per patient (4/18/19)    Long Term Goals: To be accomplished in 8 weeks:  1.  Patient will increase PROM of right shoulder in all planes by 30 degrees to restore mobility for ease dressing.   2. Pt will achieve 110 deg AAROM flexion to restore over head mobility for reaching.   progressing 115 after manual (4/22/19)  degrees post manual (4/24/19)  3. Patient will increase right shoulder strength to 4/5 to improve ease of daily tasks. 4. Patient will increase FOTO score by 22 pts, 66/100, to show improved functional mobility and QOL.     PLAN  []  Upgrade activities as tolerated     [x]  Continue plan of care  []  Update interventions per flow sheet       []  Discharge due to:_  []  Other:_      Rajesh Tierney PTA 4/26/2019  1:29 PM    Future Appointments   Date Time Provider Dex Hatfield   4/26/2019  1:30 PM Narjasvir Salazar, PTA MMCPTPB [facility]   4/30/2019  2:00 PM Narvis Martin, PTA MMCPTPB [facility]   5/1/2019 12:30 PM Yancy Edouard MMCPTPB [facility]   5/3/2019 11:15 AM [facility]   5/3/2019  2:30 PM Narvis Martin, PTA MMCPTPB [facility]   5/6/2019 12:30 PM Travon Lin, PT [id number] [facility]   5/8/2019  2:00 PM Narvis Rice, PTA MMCPTPB [facility]   5/10/2019  1:30 PM Narvis Rice, PTA MMCPTPB [facility]   5/14/2019  2:30 PM Travon Lin, PT [id number] [facility]   5/17/2019 10:30 AM Travon Lin, PT MMCPTPB [facility]   5/20/2019  1:00 PM Narvis Rice, PTA MMCPTPB [facility]   5/22/2019 12:00 PM Travon Lin, PT [id number] [facility]   5/24/2019  1:30 PM Narjasvir Salazar, PTA MMCPTPB [facility]   5/28/2019  1:30 PM Narvis Rice, PTA MMCPTPB [facility]   5/30/2019  1:00 PM Narvis Rice, PTA MMCPTPB [facility]   5/31/2019 11:00 AM Chartiffani Lin, PT MMCPTPB [facility]   6/3/2019  1:00 PM Travon Lin, PT MMCPTPB [facility]   6/5/2019 11:00 AM Narvis Martin, PTA MMCPTPB [facility]   6/7/2019 10:00 AM Judson Patel MD [place] [id number]   6/7/2019  1:00 PM Narvis Rice, PTA MMCPTPB [facility]   6/26/2019 11:00 AM [facility] [address]   7/22/2019  8:30 AM Janet Bradshaw MD [facility]   10/2/2019  1:00 PM Shanita Chinchilla MD [address] [facility]

## 2019-04-30 ENCOUNTER — HOSPITAL ENCOUNTER (OUTPATIENT)
Dept: PHYSICAL THERAPY | Age: 64
Discharge: HOME | End: 2019-04-30
Payer: MEDICARE

## 2019-04-30 PROCEDURE — 97110 THERAPEUTIC EXERCISES: CPT

## 2019-04-30 PROCEDURE — 97140 MANUAL THERAPY 1/> REGIONS: CPT

## 2019-04-30 PROCEDURE — 97016 VASOPNEUMATIC DEVICE THERAPY: CPT

## 2019-04-30 RX ORDER — BUTALBITAL, ACETAMINOPHEN AND CAFFEINE 300; 40; 50 MG/1; MG/1; MG/1
1 CAPSULE ORAL
Qty: 90 CAP | Refills: 1 | Status: SHIPPED | OUTPATIENT
Start: 2019-04-30 | End: 2019-08-27 | Stop reason: SDUPTHER

## 2019-04-30 NOTE — TELEPHONE ENCOUNTER
Requested Prescriptions     Pending Prescriptions Disp Refills    butalbital-acetaminophen-caff (FIORICET) -40 mg per capsule 90 Cap 1     Sig: Take 1 Cap by mouth every eight (8) hours as needed for Pain.

## 2019-05-01 ENCOUNTER — HOSPITAL ENCOUNTER (OUTPATIENT)
Dept: PHYSICAL THERAPY | Age: 64
Discharge: HOME | End: 2019-05-01
Payer: MEDICARE

## 2019-05-01 PROCEDURE — 97140 MANUAL THERAPY 1/> REGIONS: CPT

## 2019-05-01 PROCEDURE — 97110 THERAPEUTIC EXERCISES: CPT

## 2019-05-01 NOTE — PROGRESS NOTES
PT DAILY TREATMENT NOTE 10-18    Patient Name: Pedro Marin  Date:2019  : 1955  [x]  Patient  Verified  Payor: [place] MED[place] / Plan: [place] MEDICARE PART A & B / Product Type: Medicare /    In time:1030  Out time:1120  Total Treatment Time (min): 46  Visit #: 7 of     Medicare/BCBS Only   Total Timed Codes (min):  46 1:1 Treatment Time:  36       Treatment Area: Pain in left knee [M25.562]  Pain in right shoulder [M25.511]    SUBJECTIVE  Pain Level (0-10 scale): 2-3/10  Any medication changes, allergies to medications, adverse drug reactions, diagnosis change, or new procedure performed?: [x] No    [] Yes (see summary sheet for update)  Subjective functional status/changes:   [] No changes reported  Reports he can now reach to shave. And get his hand behind his back with much improvement since starting therapy.      OBJECTIVE    Modality rationale: decrease pain to improve the patients ability to ease with ADL's   Min Type Additional Details    [] Estim:  []Unatt       []IFC  []Premod                        []Other:  []w/ice   []w/heat  Position:  Location:    [] Estim: []Att    []TENS instruct  []NMES                    []Other:  []w/US   []w/ice   []w/heat  Position:  Location:    []  Traction: [] Cervical       []Lumbar                       [] Prone          []Supine                       []Intermittent   []Continuous Lbs:  [] before manual  [] after manual    []  Ultrasound: []Continuous   [] Pulsed                           []1MHz   []3MHz W/cm2:  Location:    []  Iontophoresis with dexamethasone         Location: [] Take home patch   [] In clinic   10 []  Ice     [x]  heat  []  Ice massage  []  Laser   []  Anodyne Position:seated  Location:right shoulder    []  Laser with stim  []  Other:  Position:  Location:    []  Vasopneumatic Device Pressure:       [] lo [] med [] hi   Temperature: [] lo [] med [] hi   [] Skin assessment post-treatment:  []intact []redness- no adverse reaction        26 min Therapeutic Exercise:  [] See flow sheet :   Rationale: increase ROM and increase strength to improve the patients ability to ease with ADl'S    10 min Manual Therapy:  PROM, Jt mobs grades 3, scapula mobs, scapula clocks, inferior capsule stretch, posterior capsule stretch, subscap TPR. Rationale: decrease pain, increase ROM, increase tissue extensibility and decrease trigger points to ease with ADL's          With   [] TE   [] TA   [] neuro   [] other: Patient Education: [x] Review HEP    [] Progressed/Changed HEP based on:   [] positioning   [] body mechanics   [] transfers   [] heat/ice application    [] other:      Other Objective/Functional Measures: AAROM with cane in supine fcqseow=817 deg. Pt uses [place] compensation and shoulder hiking for shoulder elevation and scaption. Right thumb to PSIS, left thumb to mid TS. Requires cueing for posture correction during standing shoulder ex's. Pain Level (0-10 scale) post treatment: 0/10    ASSESSMENT/Changes in Function: Progressing well with being able to reach his back pocket and his face to shave. Pt reports he does his ex's daily. He is educated in initiating self assist AAROM seated and in supine. He is pleased with his progress. Patient will continue to benefit from skilled PT services to modify and progress therapeutic interventions, address functional mobility deficits, address ROM deficits, address strength deficits, analyze and cue movement patterns, assess and modify postural abnormalities and address imbalance/dizziness to attain remaining goals. []  See Plan of Care  [x]  See progress note/recertification  []  See Discharge Summary         Progress towards goals / Updated goals:  Short Term Goals: To be accomplished in 1 weeks:  1. Patient will be compliant with HEP to improve ROM and improve therapy outcomes. MET per patient (4/18/19)    Long Term Goals: To be accomplished in 8 weeks:  1.  Patient will increase PROM of right shoulder in all planes by 30 degrees to restore mobility for ease dressing.   2. Pt will achieve 110 deg AAROM flexion to restore over head mobility for reaching.   progressing 115 after manual (4/22/19)  degrees post manual (4/24/19)  3. Patient will increase right shoulder strength to 4/5 to improve ease of daily tasks. 4. Patient will increase FOTO score by 22 pts, 66/100, to show improved functional mobility and QOL.         PLAN  [x]  Upgrade activities as tolerated     [x]  Continue plan of care  []  Update interventions per flow sheet       []  Discharge due to:_  []  Other:_      Florencia Meneses, PT 5/1/2019  10:37 AM    Future Appointments   Date Time Provider Dex [place]   5/3/2019 11:15 AM MD DUKE Carrera   5/3/2019  2:30 PM [place] Goes, PTA MMCPTPB [facility]   5/6/2019 12:30 PM Vedia Current, PT [id number] [facility]   5/8/2019  2:00 PM [place] Goes, PTA MMCPTPB [facility]   5/10/2019  1:30 PM [place] Goes, PTA MMCPTPB [facility]   5/14/2019  2:30 PM Vedia Current, PT VCFQHXR [facility]   5/17/2019 10:30 AM Vedia Current, PT GSVVHBR [facility]   5/20/2019  1:00 PM [place] Goes, PTA MMCPTPB [facility]   5/22/2019 12:00 PM Vedia Current, PT VWCEVZH [facility]   5/24/2019  1:30 PM [place] Goes, PTA MMCPTPB [facility]   5/28/2019  1:30 PM [place] Goes, PTA MMCPTPB [facility]   5/30/2019  1:00 PM [place] Goes, PTA MMCPTPB [facility]   5/31/2019 11:00 AM Vedia Current, PT MMCPTPB [facility]   6/3/2019  1:00 PM Vedia Current, PT MMCPTPB [facility]   6/5/2019 11:00 AM Dudley Norman, PTA MMCPTPB [facility]   6/7/2019 10:00 AM Ivone Wilson MD [place] [id number]   6/7/2019  1:00 PM Dudley Norman, PTA MMCPTPB [facility]   6/26/2019 11:00 AM [facility] [address]   7/22/2019  8:30 AM Stacia Veloz  [address]   10/2/2019  1:00 PM Deann Chinchilla MD [facility]

## 2019-05-02 ENCOUNTER — APPOINTMENT (OUTPATIENT)
Dept: GENERAL RADIOLOGY | Age: 64
End: 2019-05-02
Attending: EMERGENCY MEDICINE
Payer: MEDICARE

## 2019-05-02 ENCOUNTER — HOSPITAL ENCOUNTER (EMERGENCY)
Age: 64
Discharge: HOME | End: 2019-05-02
Attending: EMERGENCY MEDICINE
Payer: MEDICARE

## 2019-05-02 VITALS
WEIGHT: 228 LBS | RESPIRATION RATE: 16 BRPM | TEMPERATURE: 98.7 F | BODY MASS INDEX: 32.64 KG/M2 | HEART RATE: 76 BPM | DIASTOLIC BLOOD PRESSURE: 87 MMHG | HEIGHT: 70 IN | SYSTOLIC BLOOD PRESSURE: 149 MMHG | OXYGEN SATURATION: 100 %

## 2019-05-02 DIAGNOSIS — S42.125A CLOSED NONDISPLACED FRACTURE OF LEFT ACROMIAL PROCESS, INITIAL ENCOUNTER: ICD-10-CM

## 2019-05-02 DIAGNOSIS — S20.212A CONTUSION OF RIB ON LEFT SIDE, INITIAL ENCOUNTER: ICD-10-CM

## 2019-05-02 DIAGNOSIS — Q68.1: ICD-10-CM
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
## 2019-05-02 PROCEDURE — 96372 THER/PROPH/DIAG INJ SC/IM: CPT

## 2019-05-02 PROCEDURE — L3650 SO 8 ABD RESTRAINT PRE OTS: HCPCS

## 2019-05-02 PROCEDURE — 71101 X-RAY EXAM UNILAT RIBS/CHEST: CPT

## 2019-05-02 PROCEDURE — 74011250636 HC RX REV CODE- 250/636: Performed by: PHYSICIAN ASSISTANT

## 2019-05-02 PROCEDURE — 73080 X-RAY EXAM OF ELBOW: CPT

## 2019-05-02 PROCEDURE — 73030 X-RAY EXAM OF SHOULDER: CPT

## 2019-05-02 PROCEDURE — 73130 X-RAY EXAM OF HAND: CPT

## 2019-05-02 PROCEDURE — 99283 EMERGENCY DEPT VISIT LOW MDM: CPT

## 2019-05-02 RX ORDER — MORPHINE SULFATE 2 MG/ML
2 INJECTION, SOLUTION INTRAMUSCULAR; INTRAVENOUS
Status: COMPLETED | OUTPATIENT
Start: 2019-05-02 | End: 2019-05-02

## 2019-05-02 RX ORDER — OXYCODONE AND ACETAMINOPHEN 5; 325 MG/1; MG/1
1 TABLET ORAL
Qty: 12 TAB | Refills: 0 | Status: SHIPPED | OUTPATIENT
Start: 2019-05-02 | End: 2019-05-03 | Stop reason: ALTCHOICE

## 2019-05-02 RX ADMIN — MORPHINE SULFATE 2 MG: 2 INJECTION, SOLUTION INTRAMUSCULAR; INTRAVENOUS at 19:11

## 2019-05-02 NOTE — ED PROVIDER NOTES
EMERGENCY DEPARTMENT HISTORY AND PHYSICAL EXAM    Date: 5/2/2019  Patient Name: Lucian Swan    History of Presenting Illness     Chief Complaint   Patient presents with   [facility] Fall    Rib Pain    Elbow Pain    Hand Pain    Shoulder Pain         History Provided By: patient     Chief Complaint: fall  Duration: just PTA  Timing: acute  Location: L elbow/shoulder/hand/ribs  Quality throbbing   Severity: moderate  Modifying Factors: none   Associated Symptoms rib pain, hand pain, elbow pain, shoulder pain     Additional History (Context): Lucian Swan is a 75 y.o. male with PMH arthritis, htn, high cholesterol, PE, GERD, and chronic pain who presents with c/o pain to the L rib cage, L elbow, L hand, and L shoulder after a trip and fall onto concrete PTA. Denies head injury and LOC. No other complaints. PCP: Tracy Crane MD    Current Outpatient Medications   Medication Sig Dispense Refill    oxyCODONE-acetaminophen (PERCOCET) 5-325 mg per tablet Take 1 Tab by mouth every six (6) hours as needed for Pain for up to 3 days. Max Daily Amount: 4 Tabs. 12 Tab 0    butalbital-acetaminophen-caff (FIORICET) -40 mg per capsule Take 1 Cap by mouth every eight (8) hours as needed for Pain. 90 Cap 1    diclofenac (VOLTAREN) 1 % gel Apply 4 g to affected area four (4) times daily. Maximum 16 grams per joint per day. Dispense 5 100 gram tubes 5 Each 0    lisinopril-hydroCHLOROthiazide (PRINZIDE, ZESTORETIC) 20-12.5 mg per tablet TAKE 1 TABLET BY MOUTH DAILY 90 Tab 0    celecoxib (CELEBREX) 50 mg capsule Take 50 mg by mouth two (2) times a day.  furosemide (LASIX) 20 mg tablet 1 tab each AM 30 Tab 1    labetalol (NORMODYNE) 100 mg tablet TAKE ONE TABLET BY MOUTH TWICE DAILY 180 Tab 0    metaxalone (SKELAXIN) 800 mg tablet Take 1 Tab by mouth three (3) times daily.  Indications: muscle spasm 90 Tab 2    raNITIdine (ZANTAC) 150 mg tablet TK 1 T PO HS  1    ALPRAZolam (XANAX) 0.5 mg tablet Take one half(1/2) tab to one(1) tab by mouth at bedtime as needed for sleep 30 Tab 0    lansoprazole (PREVACID) 30 mg capsule TAKE 1 CAPSULE DAILY BEFOREBREAKFAST 90 Cap 3    warfarin (COUMADIN) 5 mg tablet TAKE 2 AND 1/2 TABLETS BY MOUTH DAILY OR AS DIRECTED 75 Tab 0    warfarin (COUMADIN) 3 mg tablet Or as directed 30 Tab 3    montelukast (SINGULAIR) 10 mg tablet TAKE 1 TABLET BY MOUTH EVERY DAY (Patient taking differently: TAKE 1 TABLET BY MOUTH EVERY HS) 90 Tab 0    acetaminophen (TYLENOL) 500 mg tablet Take 1,000 mg by mouth every six (6) hours as needed for Pain.          Past History     Past Medical History:  Past Medical History:   Diagnosis Date    Arthritis     Bleeding     Chronic pain     knee and shoulder    GERD (gastroesophageal reflux disease)     Headache(784.0)     migraine    High cholesterol     Hypertension     Lower back pain 11/6/2010    Other chest pain     Pure hypercholesterolemia     Right buttock pain 11/6/2010    Right foot pain     Rotator cuff tear     left-since 2010, worsened tear Jan.    Rotator cuff tear, right     Spinal stenosis     Tendonitis, tibialis     anterior    Thromboembolus ([address]     3 after sx last one 2000       Past Surgical History:  Past Surgical History:   Procedure Laterality Date    FOOT/TOES SURGERY PROC UNLISTED      HX BACK SURGERY      HX HEENT Right 09/2018    eye surgery, macular     HX KNEE REPLACEMENT Left     HX ORTHOPAEDIC  06-25-12    Right foot with excision of bursa and adipose tissue from fifth metatarsal base by Dr. Gurpreet Palomino      lower back (1992 and 2000)    HX OTHER SURGICAL      left foot (2008)    HX OTHER SURGICAL      Retina repair     HX PROSTATECTOMY  11/2018    HX ROTATOR CUFF REPAIR Right 01/28/2019    by Dr. Brody Ralph         Family History:  Family History   Problem Relation Age of Onset   Jeferson Harder Arthritis-rheumatoid Mother     Dementia Mother     Heart Disease Father     Cancer Father     Hypertension Other     Cancer Brother        Social History:  Social History     Tobacco Use    Smoking status: Never Smoker    Smokeless tobacco: Never Used   Substance Use Topics    Alcohol use: No    Drug use: No       Allergies: Allergies   Allergen Reactions    Amoxicillin Itching    Augmentin [Amoxicillin-Pot Clavulanate] Itching    Chlorhexidine Towelette Itching    Hibiclens [Chlorhexidine Gluconate] Itching    Milk Containing Products Diarrhea    Penicillins Rash    Requip [Ropinirole] Nausea and Vomiting         Review of Systems   Review of Systems   Constitutional: Negative. Negative for chills and fever. HENT: Negative. Negative for congestion, ear pain and rhinorrhea. Eyes: Negative. Negative for pain and redness. Respiratory: Negative. Negative for cough, shortness of breath, wheezing and stridor. Cardiovascular: Negative. Negative for chest pain and leg swelling. Gastrointestinal: Negative. Negative for abdominal pain, constipation, diarrhea, nausea and vomiting. Genitourinary: Negative. Negative for dysuria and frequency. Musculoskeletal: Positive for arthralgias. Negative for back pain and neck pain. Skin: Positive for wound. Negative for rash. Neurological: Negative. Negative for dizziness, seizures, syncope and headaches. All other systems reviewed and are negative. All Other Systems Negative  Physical Exam     Vitals:    05/02/19 1709 05/1955   BP: 139/82 149/87   Pulse: 76    Resp: 18 16   Temp: 98.9 °F (37.2 °C) 98.7 °F (37.1 °C)   SpO2: 98% 100%   Weight: 103.4 kg (228 lb)    Height: 5' 10\" (1.778 m)      Physical Exam   Constitutional: He is oriented to person, place, and time. He appears well-developed and well-nourished. He appears distressed. HENT:   Head: Normocephalic and atraumatic. Eyes: Pupils are equal, round, and reactive to light. Conjunctivae and EOM are normal. Right eye exhibits no discharge. Left eye exhibits no discharge. No scleral icterus. Neck: Normal range of motion. Neck supple. Cardiovascular: Normal rate, regular rhythm and normal heart sounds. Exam reveals no gallop and no friction rub. No murmur heard. Pulmonary/Chest: Effort normal and breath sounds normal. No stridor. No respiratory distress. He has no wheezes. He has no rales. Diffuse TTP noted to the L anterior lower rib cage   Musculoskeletal: Normal range of motion. He exhibits tenderness. He exhibits no edema or deformity. LUE: intact pulses, cap RF < 3 sec, TTP noted to the palmar surface of the hand, ROM of the hand and wrist intact, no obvious edema or deformity seen. Mild TTP noted over the olecranon/ROM intact/no deformity seen  Diffuse TTP noted over the [facility] joint with limited ROM upon flexion of the shoulder. No TTP noted along the spine. Neurological: He is alert and oriented to person, place, and time. Coordination normal.   Gait is steady. Able to ambulate without difficulty. Skin: Skin is warm and dry. No rash noted. He is not diaphoretic. No erythema. Small abrasions noted over the L elbow, bleeding controlled. Psychiatric: He has a normal mood and affect. His behavior is normal. Thought content normal.   Nursing note and vitals reviewed. Diagnostic Study Results     Labs -   No results found for this or any previous visit (from the past 12 hour(s)). Radiologic Studies -   XR ELBOW LT MIN 3 V   Final Result   IMPRESSION:      Left shoulder:   No definite acute findings. Mild cortical irregularity along the undersurface of   the acromion, equivocal for subtle fracture. Clinical correlation advised. Left elbow:   No acute osseous findings. See details above. Left hand:   No acute osseous findings. Notable degenerative changes as above. XR SHOULDER LT AP/LAT MIN 2 V   Final Result   IMPRESSION:      Left shoulder:   No definite acute findings.  Mild cortical irregularity along the undersurface of   the acromion, equivocal for subtle fracture. Clinical correlation advised. Left elbow:   No acute osseous findings. See details above. Left hand:   No acute osseous findings. Notable degenerative changes as above. XR RIBS LT W PA CXR MIN 3 V   Final Result   IMPRESSION:      No acute radiographic findings. XR HAND LT MIN 3 V   Final Result   IMPRESSION:      Left shoulder:   No definite acute findings. Mild cortical irregularity along the undersurface of   the acromion, equivocal for subtle fracture. Clinical correlation advised. Left elbow:   No acute osseous findings. See details above. Left hand:   No acute osseous findings. Notable degenerative changes as above. CT Results  (Last 48 hours)    None        CXR Results  (Last 48 hours)    None            Medical Decision Making   I am the first provider for this patient. I reviewed the vital signs, available nursing notes, past medical history, past surgical history, family history and social history. Vital Signs-Reviewed the patient's vital signs. Records Reviewed:Emelina Briseno PA-C     Procedures:  Procedures    Provider Notes (Medical Decision Making): Impression:  Fall, rib contusion, acromion fx    X-rays negative with the exception of a possible subtle fracture at the left acromion, arm placed in an immobilizer and pt refereed for ortho follow-up. Pt agrees. Emelina Murillo PA-C     MED RECONCILIATION:  No current facility-administered medications for this encounter. Current Outpatient Medications   Medication Sig    oxyCODONE-acetaminophen (PERCOCET) 5-325 mg per tablet Take 1 Tab by mouth every six (6) hours as needed for Pain for up to 3 days. Max Daily Amount: 4 Tabs.  butalbital-acetaminophen-caff (FIORICET) -40 mg per capsule Take 1 Cap by mouth every eight (8) hours as needed for Pain.     diclofenac (VOLTAREN) 1 % gel Apply 4 g to affected area four (4) times daily. Maximum 16 grams per joint per day. Dispense 5 100 gram tubes    lisinopril-hydroCHLOROthiazide (PRINZIDE, ZESTORETIC) 20-12.5 mg per tablet TAKE 1 TABLET BY MOUTH DAILY    celecoxib (CELEBREX) 50 mg capsule Take 50 mg by mouth two (2) times a day.  furosemide (LASIX) 20 mg tablet 1 tab each AM    labetalol (NORMODYNE) 100 mg tablet TAKE ONE TABLET BY MOUTH TWICE DAILY    metaxalone (SKELAXIN) 800 mg tablet Take 1 Tab by mouth three (3) times daily. Indications: muscle spasm    raNITIdine (ZANTAC) 150 mg tablet TK 1 T PO HS    ALPRAZolam (XANAX) 0.5 mg tablet Take one half(1/2) tab to one(1) tab by mouth at bedtime as needed for sleep    lansoprazole (PREVACID) 30 mg capsule TAKE 1 CAPSULE DAILY BEFOREBREAKFAST    warfarin (COUMADIN) 5 mg tablet TAKE 2 AND 1/2 TABLETS BY MOUTH DAILY OR AS DIRECTED    warfarin (COUMADIN) 3 mg tablet Or as directed    montelukast (SINGULAIR) 10 mg tablet TAKE 1 TABLET BY MOUTH EVERY DAY (Patient taking differently: TAKE 1 TABLET BY MOUTH EVERY HS)    acetaminophen (TYLENOL) 500 mg tablet Take 1,000 mg by mouth every six (6) hours as needed for Pain. Disposition:  D/c    DISCHARGE NOTE:   Patient is stable for discharge at this time. Rx for percocet  given. Rest and follow-up with PCP this week. Return to the ED immediately for any new or worsening sx.   April THOMAS Murillo PA-C 8:07 PM   Follow-up Information     Follow up With Specialties Details Why [address] [place]  Schedule an appointment as soon as possible for a visit in 1 week  [address] [place] [id number]  [address] [place] [address] EMERGENCY DEPT Emergency Medicine  As needed, If symptoms worsen [address] [facility]  [phone]          Current Discharge Medication List      START taking these medications    Details   oxyCODONE-acetaminophen (PERCOCET) 5-325 mg per tablet Take 1 Tab by mouth every six (6) hours as needed for Pain for up to 3 days. Max Daily Amount: 4 Tabs. Qty: 12 Tab, Refills: 0    Associated Diagnoses: Closed nondisplaced fracture of left acromial process, initial encounter                   Diagnosis     Clinical Impression:   1. Fall, initial encounter    2. Contusion of rib on left side, initial encounter    3. Acromacria    4.  Closed nondisplaced fracture of left acromial process, initial encounter

## 2019-05-02 NOTE — ED TRIAGE NOTES
Patient states experiencing trip and fall at 1500 today. C/o pain to left ribs, shoulder, elbow, and hand. Denies LOC or striking head during fall. States landing on concrete surface. Advises that he is on blood thinners.

## 2019-05-02 NOTE — DISCHARGE INSTRUCTIONS
Patient Education        Preventing Falls: Care Instructions  Your Care Instructions    Getting around your home safely can be a challenge if you have injuries or health problems that make it easy for you to fall. Loose rugs and furniture in walkways are among the dangers for many older people who have problems walking or who have poor eyesight. People who have conditions such as arthritis, osteoporosis, or dementia also have to be careful not to fall. You can make your home safer with a few simple measures. Follow-up care is a key part of your treatment and safety. Be sure to make and go to all appointments, and call your doctor if you are having problems. It's also a good idea to know your test results and keep a list of the medicines you take. How can you care for yourself at home? Taking care of yourself  · You may get dizzy if you do not drink enough water. To prevent dehydration, drink plenty of fluids, enough so that your urine is light yellow or clear like water. Choose water and other caffeine-free clear liquids. If you have kidney, heart, or liver disease and have to limit fluids, talk with your doctor before you increase the amount of fluids you drink. · Exercise regularly to improve your strength, muscle tone, and balance. Walk if you can. Swimming may be a good choice if you cannot walk easily. · Have your vision and hearing checked each year or any time you notice a change. If you have trouble seeing and hearing, you might not be able to avoid objects and could lose your balance. · Know the side effects of the medicines you take. Ask your doctor or pharmacist whether the medicines you take can affect your balance. Sleeping pills or sedatives can affect your balance. · Limit the amount of alcohol you drink. Alcohol can impair your balance and other senses. · Ask your doctor whether calluses or corns on your feet need to be removed.  If you wear loose-fitting shoes because of calluses or corns, you can lose your balance and fall. · Talk to your doctor if you have numbness in your feet. Preventing falls at home  · Remove raised doorway thresholds, throw rugs, and clutter. Repair loose carpet or raised areas in the floor. · Move furniture and electrical cords to keep them out of walking paths. · Use nonskid floor wax, and wipe up spills right away, especially on ceramic tile floors. · If you use a walker or cane, put rubber tips on it. If you use crutches, clean the bottoms of them regularly with an abrasive pad, such as steel wool. · Keep your house well lit, especially Lieutenant Sia, and outside walkways. Use night-lights in areas such as hallways and bathrooms. Add extra light switches or use remote switches (such as switches that go on or off when you clap your hands) to make it easier to turn lights on if you have to get up during the night. · Install sturdy handrails on stairways. · Move items in your cabinets so that the things you use a lot are on the lower shelves (about waist level). · Keep a cordless phone and a flashlight with new batteries by your bed. If possible, put a phone in each of the main rooms of your house, or carry a cell phone in case you fall and cannot reach a phone. Or, you can wear a device around your neck or wrist. You push a button that sends a signal for help. · Wear low-heeled shoes that fit well and give your feet good support. Use footwear with nonskid soles. Check the heels and soles of your shoes for wear. Repair or replace worn heels or soles. · Do not wear socks without shoes on wood floors. · Walk on the grass when the sidewalks are slippery. If you live in an area that gets snow and ice in the winter, sprinkle salt on slippery steps and sidewalks. Preventing falls in the bath  · Install grab bars and nonskid mats inside and outside your shower or tub and near the toilet and sinks. · Use shower chairs and bath benches.   · Use a hand-held shower head that will allow you to sit while showering. · Get into a tub or shower by putting the weaker leg in first. Get out of a tub or shower with your strong side first.  · Repair loose toilet seats and consider installing a raised toilet seat to make getting on and off the toilet easier. · Keep your bathroom door unlocked while you are in the shower. Where can you learn more? Go to http://rivka-jarrell.info/. Enter 0476 [id number] in the search box to learn more about \"Preventing Falls: Care Instructions. \"  Current as of: March 15, 2018  Content Version: 11.9  © 1118-0679 [employer]. Care instructions adapted under license by [employer] (which disclaims liability or warranty for this information). If you have questions about a medical condition or this instruction, always ask your healthcare professional. [place] [address] any warranty or liability for your use of this information. Patient Education        Chest Contusion: Care Instructions  Your Care Instructions  A chest contusion, or bruise, is caused by a fall or direct blow to the chest. Car crashes, falls, getting punched, and injury from bicycle handlebars are common causes of chest contusions. A very forceful blow to the chest can injure the heart or blood vessels in the chest, the lungs, the airway, the liver, or the spleen. Pain may be caused by an injury to muscles, cartilage, or ribs. Deep breathing, coughing, or sneezing can increase your pain. Lying on the injured area also can cause pain. Follow-up care is a key part of your treatment and safety. Be sure to make and go to all appointments, and call your doctor if you are having problems. It's also a good idea to know your test results and keep a list of the medicines you take. How can you care for yourself at home? · Rest and protect the injured or sore area.  Stop, change, or take a break from any activity that may be causing your pain.  · Put ice or a cold pack on the area for 10 to 20 minutes at a time. Put a thin cloth between the ice and your skin. · After 2 or 3 days, if your swelling is gone, apply a heating pad set on low or a warm cloth to your chest. Some doctors suggest that you go back and forth between hot and cold. Put a thin cloth between the heating pad and your skin. · Do not wrap or tape your ribs for support. This may cause you to take smaller breaths, which could increase your risk of pneumonia and lung collapse. · Ask your doctor if you can take an over-the-counter pain medicine, such as acetaminophen (Tylenol), ibuprofen (Advil, Motrin), or naproxen (Aleve). Be safe with medicines. Read and follow all instructions on the label. · Take your medicines exactly as prescribed. Call your doctor if you think you are having a problem with your medicine. · Gentle stretching and massage may help you feel better after a few days of rest. Stretch slowly to the point just before discomfort begins, then hold the stretch for at least 15 to 30 seconds. Do this 3 or 4 times per day. · As your pain gets better, slowly return to your normal activities. Be patient, because chest bruises can take weeks or months to heal. Any increased pain may be a sign that you need to rest a while longer. When should you call for help? Call 911 anytime you think you may need emergency care.  For example, call if:    · You have severe trouble breathing.     · You cough up blood.    Call your doctor now or seek immediate medical care if:    · You have belly pain.     · You are dizzy or lightheaded, or you feel like you may faint.     · You develop new symptoms with the chest pain.     · Your chest pain gets worse.     · You have a fever.     · You have some shortness of breath.     · You have a cough that brings up mucus from the lungs.    Watch closely for changes in your health, and be sure to contact your doctor if:    · Your chest pain is not improving after 1 week. Where can you learn more? Go to http://rivka-jarrell.info/. Enter I174 in the search box to learn more about \"Chest Contusion: Care Instructions. \"  Current as of: 2018  Content Version: 11.9  © 7556-1048 [employer]. Care instructions adapted under license by [employer] (which disclaims liability or warranty for this information). If you have questions about a medical condition or this instruction, always ask your healthcare professional. [address] any warranty or liability for your use of this information. [employer] Activation    Thank you for requesting access to [employer]. Please follow the instructions below to securely access and download your online medical record. [employer] allows you to send messages to your doctor, view your test results, renew your prescriptions, schedule appointments, and more. How Do I Sign Up? 1. In your internet browser, go to [email or web address]  2. Click on the First Time User? Click Here link in the Sign In box. You will be redirect to the New Member Sign Up page. 3. Enter your [employer] Access Code exactly as it appears below. You will not need to use this code after youve completed the sign-up process. If you do not sign up before the expiration date, you must request a new code. [employer] Access Code: Activation code not generated  Current [employer] Status: Patient Declined (This is the date your [employer] access code will )    4. Enter the last four digits of your Social Security Number (xxxx) and Date of Birth (mm/dd/yyyy) as indicated and click Submit. You will be taken to the next sign-up page. 5. Create a [employer] ID. This will be your [employer] login ID and cannot be changed, so think of one that is secure and easy to remember. 6. Create a [employer] password. You can change your password at any time. 7. Enter your Password Reset Question and Answer.  This can be used at a later time if you forget your password. 8. Enter your e-mail address. You will receive e-mail notification when new information is available in [address] 9. Click Sign Up. You can now view and download portions of your medical record. 10. Click the Download Summary menu link to download a portable copy of your medical information. Additional Information    If you have questions, please visit the Frequently Asked Questions section of the [employer] website at [email or web address] [employer] Guard. [email or web address] Remember, [employer] is NOT to be used for urgent needs. For medical emergencies, dial 911. Complete all medications as prescribed. Follow-up with primary care doctor in 1 week. Return to the ED immediately for any new or worsening symptoms.

## 2019-05-03 ENCOUNTER — APPOINTMENT (OUTPATIENT)
Dept: PHYSICAL THERAPY | Age: 64
End: 2019-05-03
Payer: MEDICARE

## 2019-05-03 ENCOUNTER — OFFICE VISIT (OUTPATIENT)
Dept: FAMILY MEDICINE CLINIC | Facility: CLINIC | Age: 64
End: 2019-05-03

## 2019-05-03 ENCOUNTER — HOSPITAL ENCOUNTER (OUTPATIENT)
Dept: LAB | Age: 64
Discharge: HOME | End: 2019-05-03
Payer: MEDICARE

## 2019-05-03 VITALS
OXYGEN SATURATION: 97 % | RESPIRATION RATE: 16 BRPM | HEART RATE: 75 BPM | HEIGHT: 70 IN | WEIGHT: 231 LBS | BODY MASS INDEX: 33.07 KG/M2 | TEMPERATURE: 98.7 F | DIASTOLIC BLOOD PRESSURE: 80 MMHG | SYSTOLIC BLOOD PRESSURE: 120 MMHG

## 2019-05-03 DIAGNOSIS — Z79.01 WARFARIN ANTICOAGULATION: ICD-10-CM

## 2019-05-03 DIAGNOSIS — Z86.718 PERSONAL HISTORY OF VENOUS THROMBOSIS AND EMBOLISM: ICD-10-CM

## 2019-05-03 DIAGNOSIS — C61 MALIGNANT NEOPLASM OF PROSTATE (HCC): ICD-10-CM

## 2019-05-03 DIAGNOSIS — E78.00 PURE HYPERCHOLESTEROLEMIA: ICD-10-CM

## 2019-05-03 DIAGNOSIS — I10 ESSENTIAL HYPERTENSION: ICD-10-CM

## 2019-05-03 DIAGNOSIS — S49.92XA INJURY OF LEFT SHOULDER, INITIAL ENCOUNTER: Primary | ICD-10-CM

## 2019-05-03 LAB
ALBUMIN SERPL-MCNC: 4 G/DL (ref 3.4–5)
ALBUMIN/GLOB SERPL: 1.5 {RATIO} (ref 0.8–1.7)
ALP SERPL-CCNC: 73 U/L (ref 45–117)
ALT SERPL-CCNC: 27 U/L (ref 16–61)
ANION GAP SERPL CALC-SCNC: 6 MMOL/L (ref 3–18)
AST SERPL-CCNC: 22 U/L (ref 15–37)
BASOPHILS # BLD: 0 K/UL (ref 0–0.1)
BASOPHILS NFR BLD: 0 % (ref 0–2)
BILIRUB SERPL-MCNC: 0.4 MG/DL (ref 0.2–1)
BUN SERPL-MCNC: 19 MG/DL (ref 7–18)
BUN/CREAT SERPL: 18 (ref 12–20)
CALCIUM SERPL-MCNC: 9 MG/DL (ref 8.5–10.1)
CHLORIDE SERPL-SCNC: 107 MMOL/L (ref 100–108)
CHOLEST SERPL-MCNC: 218 MG/DL
CO2 SERPL-SCNC: 28 MMOL/L (ref 21–32)
CREAT SERPL-MCNC: 1.06 MG/DL (ref 0.6–1.3)
DIFFERENTIAL METHOD BLD: ABNORMAL
EOSINOPHIL # BLD: 0.1 K/UL (ref 0–0.4)
EOSINOPHIL NFR BLD: 2 % (ref 0–5)
ERYTHROCYTE [DISTWIDTH] IN BLOOD BY AUTOMATED COUNT: 14.1 % (ref 11.6–14.5)
GLOBULIN SER CALC-MCNC: 2.6 G/DL (ref 2–4)
GLUCOSE SERPL-MCNC: 95 MG/DL (ref 74–99)
HCT VFR BLD AUTO: 40.8 % (ref 36–48)
HDLC SERPL-MCNC: 53 MG/DL (ref 40–60)
HDLC SERPL: 4.1 {RATIO} (ref 0–5)
HGB BLD-MCNC: 13.1 G/DL (ref 13–16)
INR BLD: 2.1
LDLC SERPL CALC-MCNC: 134.2 MG/DL (ref 0–100)
LIPID PROFILE,FLP: ABNORMAL
LYMPHOCYTES # BLD: 1.4 K/UL (ref 0.9–3.6)
LYMPHOCYTES NFR BLD: 20 % (ref 21–52)
MCH RBC QN AUTO: 30.8 PG (ref 24–34)
MCHC RBC AUTO-ENTMCNC: 32.1 G/DL (ref 31–37)
MCV RBC AUTO: 95.8 FL (ref 74–97)
MONOCYTES # BLD: 0.9 K/UL (ref 0.05–1.2)
MONOCYTES NFR BLD: 12 % (ref 3–10)
NEUTS SEG # BLD: 4.8 K/UL (ref 1.8–8)
NEUTS SEG NFR BLD: 66 % (ref 40–73)
PLATELET # BLD AUTO: 171 K/UL (ref 135–420)
PMV BLD AUTO: 11.5 FL (ref 9.2–11.8)
POTASSIUM SERPL-SCNC: 3.8 MMOL/L (ref 3.5–5.5)
PROT SERPL-MCNC: 6.6 G/DL (ref 6.4–8.2)
PSA SERPL-MCNC: 0 NG/ML (ref 0–4)
PT POC: NORMAL SECONDS
RBC # BLD AUTO: 4.26 M/UL (ref 4.7–5.5)
SODIUM SERPL-SCNC: 141 MMOL/L (ref 136–145)
TRIGL SERPL-MCNC: 154 MG/DL (ref ?–150)
VALID INTERNAL CONTROL?: YES
VLDLC SERPL CALC-MCNC: 30.8 MG/DL
WBC # BLD AUTO: 7.2 K/UL (ref 4.6–13.2)

## 2019-05-03 PROCEDURE — 36415 COLL VENOUS BLD VENIPUNCTURE: CPT

## 2019-05-03 PROCEDURE — 85025 COMPLETE CBC W/AUTO DIFF WBC: CPT

## 2019-05-03 PROCEDURE — 80061 LIPID PANEL: CPT

## 2019-05-03 PROCEDURE — 80053 COMPREHEN METABOLIC PANEL: CPT

## 2019-05-03 PROCEDURE — 84153 ASSAY OF PSA TOTAL: CPT

## 2019-05-03 NOTE — PROGRESS NOTES
The patient presents to the office today with the chief complaint of left wrist pain    HPI    The patient is 3 months post arthroscopy with repair of a large rotator cuff tear of the right shoulder. The patient is receiving physical therapy. The shoulder is doing quite well. Unfortunately patient fell yesterday striking his left side. The patient was seen in the emergency room where x-rays of his left wrist and left elbow were negative. The patient has a question of a subcortical fracture in his left shoulder. The patient complains of pain in his left shoulder, elbow, and wrist.  The left ribs the area that is most painful. The patient did not strike his head during this fall. The patient complains of dizziness. The dizziness has a vertiginous sound to it. It also is affected by going from sitting to standing. This symptom has been present for at least a year. It was not affected by the fall. ROS  Negative for chest pain, or dyspnea. Allergies   Allergen Reactions    Amoxicillin Itching    Augmentin [Amoxicillin-Pot Clavulanate] Itching    Chlorhexidine Towelette Itching    Hibiclens [Chlorhexidine Gluconate] Itching    Milk Containing Products Diarrhea    Penicillins Rash    Requip [Ropinirole] Nausea and Vomiting       Current Outpatient Medications   Medication Sig Dispense Refill    butalbital-acetaminophen-caff (FIORICET) -40 mg per capsule Take 1 Cap by mouth every eight (8) hours as needed for Pain. 90 Cap 1    diclofenac (VOLTAREN) 1 % gel Apply 4 g to affected area four (4) times daily. Maximum 16 grams per joint per day. Dispense 5 100 gram tubes 5 Each 0    lisinopril-hydroCHLOROthiazide (PRINZIDE, ZESTORETIC) 20-12.5 mg per tablet TAKE 1 TABLET BY MOUTH DAILY 90 Tab 0    celecoxib (CELEBREX) 50 mg capsule Take 50 mg by mouth two (2) times a day.       labetalol (NORMODYNE) 100 mg tablet TAKE ONE TABLET BY MOUTH TWICE DAILY 180 Tab 0    metaxalone (SKELAXIN) 800 mg tablet Take 1 Tab by mouth three (3) times daily. Indications: muscle spasm 90 Tab 2    raNITIdine (ZANTAC) 150 mg tablet TK 1 T PO HS  1    ALPRAZolam (XANAX) 0.5 mg tablet Take one half(1/2) tab to one(1) tab by mouth at bedtime as needed for sleep 30 Tab 0    lansoprazole (PREVACID) 30 mg capsule TAKE 1 CAPSULE DAILY BEFOREBREAKFAST 90 Cap 3    warfarin (COUMADIN) 5 mg tablet TAKE 2 AND 1/2 TABLETS BY MOUTH DAILY OR AS DIRECTED 75 Tab 0    warfarin (COUMADIN) 3 mg tablet Or as directed 30 Tab 3    montelukast (SINGULAIR) 10 mg tablet TAKE 1 TABLET BY MOUTH EVERY DAY (Patient taking differently: TAKE 1 TABLET BY MOUTH EVERY HS) 90 Tab 0    acetaminophen (TYLENOL) 500 mg tablet Take 1,000 mg by mouth every six (6) hours as needed for Pain.          Past Medical History:   Diagnosis Date    Arthritis     Bleeding     Chronic pain     knee and shoulder    GERD (gastroesophageal reflux disease)     Headache(784.0)     migraine    High cholesterol     Hypertension     Lower back pain 11/6/2010    Other chest pain     Pure hypercholesterolemia     Right buttock pain 11/6/2010    Right foot pain     Rotator cuff tear     left-since 2010, worsened tear Young.    Rotator cuff tear, right     Spinal stenosis     Tendonitis, tibialis     anterior    Thromboembolus ([address]     3 after sx last one 2000       Past Surgical History:   Procedure Laterality Date    FOOT/TOES SURGERY PROC UNLISTED      HX BACK SURGERY      HX HEENT Right 09/2018    eye surgery, macular     HX KNEE REPLACEMENT Left     HX ORTHOPAEDIC  06-25-12    Right foot with excision of bursa and adipose tissue from fifth metatarsal base by Dr. Rivka Johnson      lower back (1992 and 2000)    HX OTHER SURGICAL      left foot (2008)    HX OTHER SURGICAL      Retina repair     HX PROSTATECTOMY  11/2018    HX ROTATOR CUFF REPAIR Right 01/28/2019    by Dr. Divina Dial History Socioeconomic History    Marital status:      Spouse name: Not on file    Number of children: Not on file    Years of education: Not on file    Highest education level: Not on file   Occupational History    Not on file   Social Needs    Financial resource strain: Not on file    Food insecurity:     Worry: Not on file     Inability: Not on file    Transportation needs:     Medical: Not on file     Non-medical: Not on file   Tobacco Use    Smoking status: Never Smoker    Smokeless tobacco: Never Used   Substance and Sexual Activity    Alcohol use: No    Drug use: No    Sexual activity: Not Currently   Lifestyle    Physical activity:     Days per week: Not on file     Minutes per session: Not on file    Stress: Not on file   Relationships    Social connections:     Talks on phone: Not on file     Gets together: Not on file     Attends Quaker service: Not on file     Active member of club or organization: Not on file     Attends meetings of clubs or organizations: Not on file     Relationship status: Not on file    Intimate partner violence:     Fear of current or ex partner: Not on file     Emotionally abused: Not on file     Physically abused: Not on file     Forced sexual activity: Not on file   Other Topics Concern     Service Not Asked    Blood Transfusions Not Asked    Caffeine Concern Not Asked    Occupational Exposure Not Asked   Lisa Priestly Hazards Not Asked    Sleep Concern Not Asked    Stress Concern Not Asked    Weight Concern Not Asked    Special Diet Not Asked    Back Care Not Asked    Exercise Not Asked    Bike Helmet Not Asked   [address] Not Asked    Self-Exams Not Asked   Social History Narrative    Not on file       Patient does not have an advanced directive on file    Visit Vitals  /80   Pulse 75   Temp 98.7 °F (37.1 °C) (Tympanic)   Resp 16   Ht 5' 10\" (1.778 m)   Wt 231 lb (104.8 kg)   SpO2 97%   BMI 33.15 kg/m²       Physical Exam  Hematomas present at the left wrist.  Exam the left wrist and left elbow are negative. Ears are normal bilaterally  Examination of the left shoulder reveals pain with movement and limited range of motion.   Right shoulder is nearly normal  Lungs are clear to auscultation percussion  Cardiac exam: S1-S2 normal, no gallops, no murmurs  No carotid bruits      Office Visit on 05/03/2019   Component Date Value Ref Range Status    VALID INTERNAL CONTROL POC 05/03/2019 Yes   Final    INR POC 05/03/2019 2.1   Final   Hospital Outpatient Visit on 04/25/2019   Component Date Value Ref Range Status    LA Volume 04/25/2019 44.72  18 - 58 mL Final    LV E' Lateral Velocity 04/25/2019 7.40  cm/s Final    LV E' Septal Velocity 04/25/2019 8.29  cm/s Final    Tapse 04/25/2019 2.50* 1.5 - 2.0 cm Final    Ao Root D 04/25/2019 3.31  cm Final    LVIDd 04/25/2019 4.70  4.2 - 5.9 cm Final    LVPWd 04/25/2019 0.95  0.6 - 1.0 cm Final    LVIDs 04/25/2019 3.35  cm Final    IVSd 04/25/2019 0.97  0.6 - 1.0 cm Final    LVOT d 04/25/2019 2.30  cm Final    LVOT Peak Velocity 04/25/2019 99.69  cm/s Final    LVOT Peak Gradient 04/25/2019 4.0  mmHg Final    LVOT VTI 04/25/2019 22.88  cm Final    MV A Jerrell 04/25/2019 89.29  cm/s Final    MV E Jerrell 04/25/2019 69.79  cm/s Final    MV E/A 04/25/2019 0.78   Final    Global Longitudinal Strain 04/25/2019 20.60  % Final    LA Vol 4C 04/25/2019 38.43  18 - 58 mL Final    LA Vol 2C 04/25/2019 44.24  18 - 58 mL Final    LA Area 4C 04/25/2019 15.6  cm2 Final    LV Mass AL 04/25/2019 179.6  88 - 224 g Final    LV Mass AL Index 04/25/2019 81.1  49 - 115 g/m2 Final    E/E' lateral 04/25/2019 9.43   Final    E/E' septal 04/25/2019 8.42   Final    E/E' ratio (averaged) 04/25/2019 8.92   Final    Mitral Valve E Wave Deceleration T* 04/25/2019 196.8  ms Final    Triscuspid Valve Regurgitation Pea* 04/25/2019 21.5  mmHg Final    TR Max Velocity 04/25/2019 232.09  cm/s Final    LA Vol Index 04/25/2019 20.19  16 - 28 ml/m2 Final    PASP 04/25/2019 25.0  mmHg Final    LA Vol Index 04/25/2019 19.97  16 - 28 ml/m2 Final    LA Vol Index 04/25/2019 17.35  16 - 28 ml/m2 Final   Clinical Support on 04/11/2019   Component Date Value Ref Range Status    VALID INTERNAL CONTROL POC 04/11/2019 Yes   Final    INR POC 04/11/2019 1.7   Final   Clinical Support on 03/25/2019   Component Date Value Ref Range Status    VALID INTERNAL CONTROL POC 03/25/2019 Yes   Final    INR POC 03/25/2019 1.7   Final   Office Visit on 03/22/2019   Component Date Value Ref Range Status    Prostate Specific Ag 03/22/2019 <0.03   0 - 4 ng/mL Final    Comment: (Methodology: Roche ECLIA)          Glucose (UA POC) 03/22/2019 Negative  Negative Final    Bilirubin (UA POC) 03/22/2019 Negative  Negative Final    Ketones (UA POC) 03/22/2019 Negative  Negative Final    Specific gravity (UA POC) 03/22/2019 1.015  1.001 - 1.035 Final    Blood (UA POC) 03/22/2019 Trace  Negative Final    pH (UA POC) 03/22/2019 6.0  4.6 - 8.0 Final    Protein (UA POC) 03/22/2019 Negative  Negative Final    Urobilinogen (UA POC) 03/22/2019 0.2 mg/dL  0.2 - 1 Final    Nitrites (UA POC) 03/22/2019 Negative  Negative Final    Leukocyte esterase (UA POC) 03/22/2019 Negative  Negative Final   Office Visit on 03/06/2019   Component Date Value Ref Range Status    VALID INTERNAL CONTROL POC 03/06/2019 Yes   Final    INR POC 03/06/2019 1.5   Final       .  Results for orders placed or performed in visit on 05/03/19   AMB POC PT/INR   Result Value Ref Range    VALID INTERNAL CONTROL POC Yes     Prothrombin time (POC)  seconds    INR POC 2.1        Assessment / Plan      ICD-10-CM ICD-9-CM    1. Injury of left shoulder, initial encounter S49. 92XA 959.2 REFERRAL TO ORTHOPEDICS   2. Essential hypertension I10 401.9 CBC WITH AUTOMATED DIFF      METABOLIC PANEL, COMPREHENSIVE   3.  Pure hypercholesterolemia E78.00 272.0 LIPID PANEL   4. Malignant neoplasm of prostate (HCC) C61 185 PSA, DIAGNOSTIC (PROSTATE SPECIFIC AG)   5. Warfarin anticoagulation Z79.01 V58.61 AMB POC PT/INR   6. Personal history of venous thrombosis and embolism Z86.718 V12.51        Labs were ordered  I advised patient return to see Dr. Ian Gonzalez for further evaluation of the left arm especially the left shoulder after the most recent fall  Meclizine 12.5 mg as needed for dizziness  he was advised to continue his maintenance medications          Follow-up and Dispositions    · Return in about 3 months (around 8/3/2019). I asked Salma Sorto if he has any questions and I answered the questions. Salma Sorto states that he understands the treatment plan and agrees with the treatment plan          THIS DOCUMENT  [address]   IT MAY CONTAIN TRANSCRIPTION ERRORS

## 2019-05-03 NOTE — PROGRESS NOTES
Mr. Jose Card is here today for anticoagulation monitoring for the diagnosis of DVT. His INR goal is 2.0-3.0 and his current Coumadin dose is 8 mg  On fridays, 10 mg all other days. Today's findings include an INR of 2.1 (normal INR range 0.8-1.2) . Considering Mr. Winters's past history, todays findings, and per the coumadin policy/protocol, Mr. Paco Lackey was instructed to take Coumadin as follows,  Same dose. He was also instructed to schedule an appointment in 2 1/2 weeks prior to leaving for an INR check. A full discussion of the nature of anticoagulants has been carried out. A full discussion of the need for frequent and regular monitoring, precise dosage adjustment and compliance was stressed. Side effects of potential bleeding were discussed and Mr. Paco Lackey was instructed to call [phone] if there are any signs of abnormal bleeding. Mr. Paco Lackey was instructed to avoid any OTC items containing aspirin or ibuprofen and prior to starting any new OTC products to consult with his physician or pharmacist to ensure no drug interactions are present. Mr. Paco Lackey was instructed to avoid any major changes in his general diet and to avoid alcohol consumption. Mr. Paco Lackey verbalized his understanding of all instructions and will call the office with any questions, concerns, or signs of abnormal bleeding or blood clot.

## 2019-05-06 ENCOUNTER — APPOINTMENT (OUTPATIENT)
Dept: PHYSICAL THERAPY | Age: 64
End: 2019-05-06
Payer: MEDICARE

## 2019-05-07 ENCOUNTER — OFFICE VISIT (OUTPATIENT)
Dept: ORTHOPEDIC SURGERY | Facility: CLINIC | Age: 64
End: 2019-05-07

## 2019-05-07 VITALS
HEIGHT: 70 IN | TEMPERATURE: 97.8 F | RESPIRATION RATE: 16 BRPM | DIASTOLIC BLOOD PRESSURE: 71 MMHG | BODY MASS INDEX: 33.67 KG/M2 | OXYGEN SATURATION: 98 % | HEART RATE: 80 BPM | SYSTOLIC BLOOD PRESSURE: 128 MMHG | WEIGHT: 235.2 LBS

## 2019-05-07 DIAGNOSIS — M25.512 ACUTE PAIN OF LEFT SHOULDER: Primary | ICD-10-CM

## 2019-05-07 DIAGNOSIS — M25.522 LEFT ELBOW PAIN: ICD-10-CM

## 2019-05-07 NOTE — PROGRESS NOTES
Patient: Buck Lainez                MRN: [id number]       SSN: [id number]  YOB: 1955        AGE: 75 y.o. SEX: male  Body mass index is 33.75 kg/m². PCP: Marivel Chavez MD  05/07/19    Chief Complaint: Left shoulder pain    HISTORY OF PRESENT ILLNESS:  Lexii Grace presents to the office today for a new complaint. He injured his left shoulder and left elbow recently. He was walking out of the laundry mat when he lost his balance and fell directly on his left side. He was seen in the ER. His pain has been improving, as is his range of motion. He has no other significant trauma. He did have an injury to his rib, which is honestly causing him the most pain he says. Past Medical History:   Diagnosis Date    Arthritis     Bleeding     Chronic pain     knee and shoulder    GERD (gastroesophageal reflux disease)     Headache(784.0)     migraine    High cholesterol     Hypertension     Lower back pain 11/6/2010    Other chest pain     Pure hypercholesterolemia     Right buttock pain 11/6/2010    Right foot pain     Rotator cuff tear     left-since 2010, worsened tear Jan.    Rotator cuff tear, right     Spinal stenosis     Tendonitis, tibialis     anterior    Thromboembolus ([facility] [address]     3 after sx last one 2000       Family History   Problem Relation Age of Onset   [facility] Arthritis-rheumatoid Mother     Dementia Mother     Heart Disease Father     Cancer Father     Hypertension Other     Cancer Brother        Current Outpatient Medications   Medication Sig Dispense Refill    butalbital-acetaminophen-caff (FIORICET) -40 mg per capsule Take 1 Cap by mouth every eight (8) hours as needed for Pain. 90 Cap 1    diclofenac (VOLTAREN) 1 % gel Apply 4 g to affected area four (4) times daily. Maximum 16 grams per joint per day.  Dispense 5 100 gram tubes 5 Each 0    lisinopril-hydroCHLOROthiazide (PRINZIDE, ZESTORETIC) 20-12.5 mg per tablet TAKE 1 TABLET BY MOUTH DAILY 90 Tab 0    celecoxib (CELEBREX) 50 mg capsule Take 50 mg by mouth two (2) times a day.  labetalol (NORMODYNE) 100 mg tablet TAKE ONE TABLET BY MOUTH TWICE DAILY 180 Tab 0    metaxalone (SKELAXIN) 800 mg tablet Take 1 Tab by mouth three (3) times daily. Indications: muscle spasm 90 Tab 2    raNITIdine (ZANTAC) 150 mg tablet TK 1 T PO HS  1    ALPRAZolam (XANAX) 0.5 mg tablet Take one half(1/2) tab to one(1) tab by mouth at bedtime as needed for sleep 30 Tab 0    lansoprazole (PREVACID) 30 mg capsule TAKE 1 CAPSULE DAILY BEFOREBREAKFAST 90 Cap 3    warfarin (COUMADIN) 5 mg tablet TAKE 2 AND 1/2 TABLETS BY MOUTH DAILY OR AS DIRECTED 75 Tab 0    warfarin (COUMADIN) 3 mg tablet Or as directed 30 Tab 3    montelukast (SINGULAIR) 10 mg tablet TAKE 1 TABLET BY MOUTH EVERY DAY (Patient taking differently: TAKE 1 TABLET BY MOUTH EVERY HS) 90 Tab 0    acetaminophen (TYLENOL) 500 mg tablet Take 1,000 mg by mouth every six (6) hours as needed for Pain.          Allergies   Allergen Reactions    Amoxicillin Itching    Augmentin [Amoxicillin-Pot Clavulanate] Itching    Chlorhexidine Towelette Itching    Hibiclens [Chlorhexidine Gluconate] Itching    Milk Containing Products Diarrhea    Penicillins Rash    Requip [Ropinirole] Nausea and Vomiting       Past Surgical History:   Procedure Laterality Date    FOOT/TOES SURGERY PROC UNLISTED      HX BACK SURGERY      HX HEENT Right 09/2018    eye surgery, macular     HX KNEE REPLACEMENT Left     HX ORTHOPAEDIC  06-25-12    Right foot with excision of bursa and adipose tissue from fifth metatarsal base by Dr. Mary Stallworth      lower back (1992 and 2000)    HX OTHER SURGICAL      left foot (2008)    HX OTHER SURGICAL      Retina repair     HX PROSTATECTOMY  11/2018    HX ROTATOR CUFF REPAIR Right 01/28/2019    by Dr. Alta Pino History     Socioeconomic History    Marital status:  Spouse name: Not on file    Number of children: Not on file    Years of education: Not on file    Highest education level: Not on file   Occupational History    Not on file   Social Needs    Financial resource strain: Not on file    Food insecurity:     Worry: Not on file     Inability: Not on file    Transportation needs:     Medical: Not on file     Non-medical: Not on file   Tobacco Use    Smoking status: Never Smoker    Smokeless tobacco: Never Used   Substance and Sexual Activity    Alcohol use: No    Drug use: No    Sexual activity: Not Currently   Lifestyle    Physical activity:     Days per week: Not on file     Minutes per session: Not on file    Stress: Not on file   Relationships    Social connections:     Talks on phone: Not on file     Gets together: Not on file     Attends Jain service: Not on file     Active member of club or organization: Not on file     Attends meetings of clubs or organizations: Not on file     Relationship status: Not on file    Intimate partner violence:     Fear of current or ex partner: Not on file     Emotionally abused: Not on file     Physically abused: Not on file     Forced sexual activity: Not on file   Other Topics Concern     Service Not Asked    Blood Transfusions Not Asked    Caffeine Concern Not Asked    Occupational Exposure Not Asked   Williemae Red Hazards Not Asked    Sleep Concern Not Asked    Stress Concern Not Asked    Weight Concern Not Asked    Special Diet Not Asked    Back Care Not Asked    Exercise Not Asked    Bike Helmet Not Asked    Seat Belt Not Asked    Self-Exams Not Asked   Social History Narrative    Not on file       REVIEW OF SYSTEMS:      No changes from previous review of systems unless noted.     PHYSICAL EXAMINATION:  Visit Vitals  /71   Pulse 80   Temp 97.8 °F (36.6 °C) (Oral)   Resp 16   Ht 5' 10\" (1.778 m)   Wt 235 lb 3.2 oz (106.7 kg)   SpO2 98%   BMI 33.75 kg/m²     Body mass index is 33.75 kg/m².  GENERAL: Alert and oriented x3, in no acute distress. HEENT: Normocephalic, atraumatic. RESP: Non labored breathing. SKIN: No rashes or lesions noted. PHYSICAL EXAM:  Physical exam of the left shoulder and elbow with pretty much preserved full shoulder range of motion. No weakness with rotator cuff strength testing of the left shoulder. Nontender over the acromion, AC joint and proximal humerus. No pain with impingement testing. Left elbow with full range of motion and nontender. IMAGING:  X-rays of the left shoulder and elbow were reviewed in the office today. I do not appreciate any acute bony abnormalities. ASSESSMENT AND PLAN:   Lacie Hanson unfortunately had a fall on his left side. He can continue physical therapy for his right shoulder, which was operated on. For his left shoulder, I do not recommend anything specific, other than return to activities as tolerated and antiinflammatories as needed for pain.              Electronically signed by: Harshal Aldana MD

## 2019-05-08 ENCOUNTER — APPOINTMENT (OUTPATIENT)
Dept: PHYSICAL THERAPY | Age: 64
End: 2019-05-08
Payer: MEDICARE

## 2019-05-09 ENCOUNTER — TELEPHONE (OUTPATIENT)
Dept: ORTHOPEDIC SURGERY | Facility: CLINIC | Age: 64
End: 2019-05-09

## 2019-05-09 NOTE — TELEPHONE ENCOUNTER
Patient wife Ric Alvarenga ( on hipaa ) called for . Mrs. Leander Roman said that since the patient had missed his physical therapy appointments due to he had fallen , that the Physical Therapist at [facility] are requesting a note from Destinee Tomlinson , stating that the patient is Cleared to start Physical Therapy again for the Right Shoulder and Left Knee. [facility] at the [facility] Fax # [phone]. Mrs. Leander Roman tel. [phone].

## 2019-05-10 ENCOUNTER — HOSPITAL ENCOUNTER (OUTPATIENT)
Dept: PHYSICAL THERAPY | Age: 64
Discharge: HOME | End: 2019-05-10
Payer: MEDICARE

## 2019-05-10 PROCEDURE — 97140 MANUAL THERAPY 1/> REGIONS: CPT

## 2019-05-10 PROCEDURE — 97110 THERAPEUTIC EXERCISES: CPT

## 2019-05-10 PROCEDURE — 97016 VASOPNEUMATIC DEVICE THERAPY: CPT

## 2019-05-10 NOTE — PROGRESS NOTES
PT DAILY TREATMENT NOTE 10-18    Patient Name: Elisha Leung  Date:5/10/2019  : 1955  [x]  Patient  Verified  Payor: [place] MEDICARE / Plan: [place] MEDICARE PART A & B / Product Type: Medicare /    In time:130  Out time:215  Total Treatment Time (min): 45  Visit #: 8 of     Medicare/BCBS Only   Total Timed Codes (min):  30 1:1 Treatment Time:  30       Treatment Area: Pain in left knee [M25.562]  Pain in right shoulder [M25.511]    SUBJECTIVE  Pain Level (0-10 scale): 2/10  Any medication changes, allergies to medications, adverse drug reactions, diagnosis change, or new procedure performed?: [x] No    [] Yes (see summary sheet for update)  Subjective functional status/changes:   [] No changes reported  Pt stated that he is doing ok today    OBJECTIVE    Modality rationale: decrease inflammation and decrease pain to improve the patients ability to increase ease with ADLs   Min Type Additional Details    [] Estim:  []Unatt       []IFC  []Premod                        []Other:  []w/ice   []w/heat  Position:  Location:    [] Estim: []Att    []TENS instruct  []NMES                    []Other:  []w/US   []w/ice   []w/heat  Position:  Location:    []  Traction: [] Cervical       []Lumbar                       [] Prone          []Supine                       []Intermittent   []Continuous Lbs:  [] before manual  [] after manual    []  Ultrasound: []Continuous   [] Pulsed                           []1MHz   []3MHz W/cm2:  Location:    []  Iontophoresis with dexamethasone         Location: [] Take home patch   [] In clinic    []  Ice     []  heat  []  Ice massage  []  Laser   []  Anodyne Position:  Location:    []  Laser with stim  []  Other:  Position:  Location:   15 [x]  Vasopneumatic Device Pressure:       [x] lo [] med [] hi   Temperature: [x] lo [] med [] hi   [x] Skin assessment post-treatment:  [x]intact []redness- no adverse reaction    []redness - adverse reaction:     22 min Therapeutic Exercise:  [x] See flow sheet :   Rationale: increase ROM and increase strength to improve the patients ability to increase ease with ADLs    8 min Manual Therapy:  PROM with oscillations and GH and scap mobs   Rationale: decrease pain, increase ROM and increase tissue extensibility to increase ease with ADLs    With   [x] TE   [] TA   [] neuro   [] other: Patient Education: [x] Review HEP    [] Progressed/Changed HEP based on:   [] positioning   [] body mechanics   [] transfers   [] heat/ice application    [] other:      Other Objective/Functional Measures:   Had no difficulty with exercises  No complaint of increased pain during session  Has good scapular mobility     Pain Level (0-10 scale) post treatment: 0/10    ASSESSMENT/Changes in Function:   Pt is slowly progressing toward goals. Cont with decreased strength and range of motion in right sh, but is slowly improving. Pt cont with slight left knee pain. Pt reports that performing ADLs is improving    Patient will continue to benefit from skilled PT services to modify and progress therapeutic interventions, address functional mobility deficits, address ROM deficits and address strength deficits to attain remaining goals. [x]  See Plan of Care  []  See progress note/recertification  []  See Discharge Summary         Progress towards goals / Updated goals:  Short Term Goals: To be accomplished in 1 weeks:  1. Patient will be compliant with HEP to improve ROM and improve therapy outcomes. MET per patient (4/18/19)    Long Term Goals: To be accomplished in 8 weeks:  1. Patient will increase PROM of right shoulder in all planes by 30 degrees to restore mobility for ease dressing.   2. Pt will achieve 110 deg AAROM flexion to restore over head mobility for reaching.   progressing 115 after manual (4/22/19)  degrees post manual (4/24/19)  3. Patient will increase right shoulder strength to 4/5 to improve ease of daily tasks.   4. Patient will increase FOTO score by 22 pts, 66/100, to show improved functional mobility and QOL.     PLAN  []  Upgrade activities as tolerated     [x]  Continue plan of care  []  Update interventions per flow sheet       []  Discharge due to:_  []  Other:_      Randymelquiades Venancio, BIRGIT 5/10/2019  1:35 PM    Future Appointments   Date Time Provider Dex [place]   5/14/2019  2:30 PM Marquis Reynolds P[facility] [address]   5/17/2019 10:30 AM Rosangela Monroe, PT RE[facility] [address]   5/20/2019  1:00 PM Delsheldon Topete, PTA [facility] [address]   5/21/2019 11:00 AM Solange Acharya MD [facility]   5/22/2019 12:00 PM Rosangela Monroe, PT [facility] [address]   5/24/2019  1:30 PM Deliajoshua Topete, PTA [facility] [address]   5/28/2019  1:30 PM Deliah Topete, PTA [facility] [address]   5/30/2019  1:00 PM Deliah Topete, PTA [facility] [address]   5/31/2019 11:00 AM Rosangela Monroe, PT [facility] [address]   6/3/2019  1:00 PM Rosangela Monroe, PT [facility] [address]   6/5/2019 11:00 AM Cameron Whartonas, PTA [facility]CT[facility] [address]   6/7/2019 10:00 AM Richerd Goldmann, MD [facility] [address]   6/7/2019  1:00 PM Cameron Whartonas, PTA [facility] [address]   6/26/2019 11:00 AM [facility] [address]   7/22/2019  8:30 AM Jodee Fonseca  [address]   10/2/2019  1:00 PM Kevin Chinchilla MD [address]

## 2019-05-14 ENCOUNTER — HOSPITAL ENCOUNTER (OUTPATIENT)
Dept: PHYSICAL THERAPY | Age: 64
Discharge: HOME | End: 2019-05-14
Payer: MEDICARE

## 2019-05-14 PROCEDURE — 97016 VASOPNEUMATIC DEVICE THERAPY: CPT

## 2019-05-14 PROCEDURE — 97110 THERAPEUTIC EXERCISES: CPT

## 2019-05-14 PROCEDURE — 97140 MANUAL THERAPY 1/> REGIONS: CPT

## 2019-05-14 NOTE — PROGRESS NOTES
PT DAILY TREATMENT NOTE 10-18    Patient Name: Elisha Leung  Date:2019  : 1955  [x]  Patient  Verified  Payor: VA MEDICARE / Plan: VA MEDICARE PART A & B / Product Type: Medicare /    In time: 2:30  Out time: 3:34  Total Treatment Time (min): 64  Visit #: 9 of     Medicare/BCBS Only   Total Timed Codes (min):  64 1:1 Treatment Time:  49       Treatment Area: Pain in left knee [M25.562]  Pain in right shoulder [M25.511]    SUBJECTIVE  Pain Level (0-10 scale): 1-2/10 right shoulder, 2-3/10 left knee  Any medication changes, allergies to medications, adverse drug reactions, diagnosis change, or new procedure performed?: [x] No    [] Yes (see summary sheet for update)  Subjective functional status/changes:   [x] No changes reported  I am coming along really well with the shoulder.      OBJECTIVE    Modality rationale: decrease inflammation and decrease pain to improve the patients ability to increase ease with ADLs   Min Type Additional Details    [] Estim:  []Unatt       []IFC  []Premod                        []Other:  []w/ice   []w/heat  Position:  Location:    [] Estim: []Att    []TENS instruct  []NMES                    []Other:  []w/US   []w/ice   []w/heat  Position:  Location:    []  Traction: [] Cervical       []Lumbar                       [] Prone          []Supine                       []Intermittent   []Continuous Lbs:  [] before manual  [] after manual    []  Ultrasound: []Continuous   [] Pulsed                           []1MHz   []3MHz W/cm2:  Location:    []  Iontophoresis with dexamethasone         Location: [] Take home patch   [] In clinic    []  Ice     []  heat  []  Ice massage  []  Laser   []  Anodyne Position:  Location:    []  Laser with stim  []  Other:  Position:  Location:   15 [x]  Vasopneumatic Device Pressure:       [x] lo [] med [] hi   Temperature: [x] lo [] med [] hi   [x] Skin assessment post-treatment:  [x]intact []redness- no adverse reaction    []redness - adverse reaction:     41 min Therapeutic Exercise:  [x] See flow sheet :   Rationale: increase ROM and increase strength to improve the patients ability to increase ease with ADLs    8 min Manual Therapy:  PROM with oscillations and GH and scap mobs   Rationale: decrease pain, increase ROM and increase tissue extensibility to increase ease with ADLs    With   [x] TE   [] TA   [] neuro   [] other: Patient Education: [x] Review HEP    [] Progressed/Changed HEP based on:   [] positioning   [] body mechanics   [] transfers   [] heat/ice application    [] other:      Other Objective/Functional Measures:   - No increase in pain with TE or MT  - Guarding with MT  - ms fatigue with supine serratus punches    Pain Level (0-10 scale) post treatment: 1/10 right shoulder, 2/10 left knee    ASSESSMENT/Changes in Function:   Patient continues to progress slowly towards his goals. He demonstrates some guarding with PROM. Patient fatigues easily however reports he performs his HEP several times throughout the day and had already done them today. Patient will continue to benefit from skilled PT services to modify and progress therapeutic interventions, address functional mobility deficits, address ROM deficits and address strength deficits to attain remaining goals. [x]  See Plan of Care  []  See progress note/recertification  []  See Discharge Summary         Progress towards goals / Updated goals:  Short Term Goals: To be accomplished in 1 weeks:  1. Patient will be compliant with HEP to improve ROM and improve therapy outcomes. MET per patient (4/18/19)    Long Term Goals: To be accomplished in 8 weeks:  1.  Patient will increase PROM of right shoulder in all planes by 30 degrees to restore mobility for ease dressing.   2. Pt will achieve 110 deg AAROM flexion to restore over head mobility for reaching.   progressing 115 after manual (4/22/19)  degrees post manual (4/24/19)  3. Patient will increase right shoulder strength to 4/5 to improve ease of daily tasks. 4. Patient will increase FOTO score by 22 pts, 66/100, to show improved functional mobility and QOL.     PLAN  []  Upgrade activities as tolerated     [x]  Continue plan of care  []  Update interventions per flow sheet       []  Discharge due to:_  []  Other:_      CECELIA Mahan 5/14/2019  3:34 PM    Future Appointments   Date Time Provider Dex [place]   5/17/2019 10:30 AM Lue Nett, PT [facility]   5/20/2019  1:00 PM Netta Constant, PTA [facility]   5/21/2019 11:00 AM Oneal Jensen MD [facility]   5/22/2019 12:00 PM Lue Nett, PT [facility]   5/24/2019  1:30 PM Netta Constant, PTA [facility]   5/28/2019  1:30 PM Netta Constant, PTA [facility]   5/30/2019  1:00 PM Netta Constant, PTA [facility]   5/31/2019 11:00 AM Lue Nett, PT [facility]   6/3/2019  1:00 PM Lue Nett, PT [facility]   6/5/2019 11:00 AM Netta Constant, PTA [facility]   6/7/2019 10:00 AM Marie Lockett MD [address] [facility]   6/7/2019  1:00 PM Netta Constant, PTA [facility]   6/26/2019 11:00 AM [facility] [address] [facility]   7/22/2019  8:30 AM Moriah Lora  [address]   10/2/2019  1:00 PM Given, Leland Thomas MD [address]

## 2019-05-15 RX ORDER — MONTELUKAST SODIUM 10 MG/1
TABLET ORAL
Qty: 90 TAB | Refills: 0 | Status: SHIPPED | OUTPATIENT
Start: 2019-05-15 | End: 2019-08-05 | Stop reason: SDUPTHER

## 2019-05-17 ENCOUNTER — HOSPITAL ENCOUNTER (OUTPATIENT)
Dept: PHYSICAL THERAPY | Age: 64
Discharge: HOME | End: 2019-05-17
Payer: MEDICARE

## 2019-05-17 PROCEDURE — 97110 THERAPEUTIC EXERCISES: CPT

## 2019-05-17 PROCEDURE — 97140 MANUAL THERAPY 1/> REGIONS: CPT

## 2019-05-17 PROCEDURE — 97016 VASOPNEUMATIC DEVICE THERAPY: CPT

## 2019-05-17 NOTE — PROGRESS NOTES
PT DAILY TREATMENT NOTE 10-18    Patient Name: Marisol Olivares  Date:2019  : 1955  [x]  Patient  Verified  Payor: VA MEDICARE / Plan: VA MEDICARE PART A & B / Product Type: Medicare /    In time: 3081  Out time: 1130  Total Treatment Time (min): 58  Visit #: 10 of     Medicare/BCBS Only   Total Timed Codes (min):  43 1:1 Treatment Time:  43       Treatment Area: Pain in left knee [M25.562]  Pain in right shoulder [M25.511]    SUBJECTIVE  Pain Level (0-10 scale): 1/10 right shoulder, 2/10 left knee  Any medication changes, allergies to medications, adverse drug reactions, diagnosis change, or new procedure performed?: [x] No    [] Yes (see summary sheet for update)  Subjective functional status/changes:   [x] No changes reported  Patient reports his arthritis was acting up but is feeling better. \"My shoulder is coming along real good, im really pleased.  Left knee is stlll stiff all the time\"    OBJECTIVE    Modality rationale: decrease inflammation and decrease pain to improve the patients ability to increase ease with ADLs   Min Type Additional Details    [] Estim:  []Unatt       []IFC  []Premod                        []Other:  []w/ice   []w/heat  Position:  Location:    [] Estim: []Att    []TENS instruct  []NMES                    []Other:  []w/US   []w/ice   []w/heat  Position:  Location:    []  Traction: [] Cervical       []Lumbar                       [] Prone          []Supine                       []Intermittent   []Continuous Lbs:  [] before manual  [] after manual    []  Ultrasound: []Continuous   [] Pulsed                           []1MHz   []3MHz W/cm2:  Location:    []  Iontophoresis with dexamethasone         Location: [] Take home patch   [] In clinic    []  Ice     []  heat  []  Ice massage  []  Laser   []  Anodyne Position:  Location:    []  Laser with stim  []  Other:  Position:  Location:   15 [x]  Vasopneumatic Device Pressure:       [x] lo [] med [] hi   Temperature: [x] lo [] med [] hi   [x] Skin assessment post-treatment:  [x]intact []redness- no adverse reaction    []redness - adverse reaction:       35 min Therapeutic Exercise:  [x] See flow sheet :   Rationale: increase ROM and increase strength to improve the patients ability to increase ease with ADLs    8 min Manual Therapy:  PROM with oscillations and grade III GH mobs   Rationale: decrease pain, increase ROM and increase tissue extensibility to increase ease with ADLs    With   [x] TE   [] TA   [] neuro   [] other: Patient Education: [x] Review HEP    [] Progressed/Changed HEP based on:   [] positioning   [] body mechanics   [] transfers   [] heat/ice application    [] other:      Other Objective/Functional Measures:   UT hiking with shoulder flexion <90 degrees in standing, used mirror for visual feedback  Functional IR after towel IR stretch to S3   See goals below    Pain Level (0-10 scale) post treatment: 0/10 right shoulder, 2/10 left knee    ASSESSMENT/Changes in Function: See progress note. Patient will continue to benefit from skilled PT services to modify and progress therapeutic interventions, address functional mobility deficits, address ROM deficits and address strength deficits to attain remaining goals. []  See Plan of Care  [x]  See progress note/recertification  []  See Discharge Summary         Progress towards goals / Updated goals:  Short Term Goals: To be accomplished in 1 weeks:  1. Patient will be compliant with HEP to improve ROM and improve therapy outcomes. MET per patient (4/18/19)    Long Term Goals: To be accomplished in 8 weeks:  1.  Patient will increase PROM of right shoulder in all planes by 30 degrees to restore mobility for ease dressing.   53 degrees ER @ 45  50 degrees IR @ 45  88 degrees abduction   125 degrees flexion   2. Pt will achieve 110 deg AAROM flexion to restore over head mobility for reaching.   Met, 120 degrees AAROM in supine  3. Patient will increase right shoulder strength to 4/5 to improve ease of daily tasks. Progressing, initiated ER strengthening in sidelying   4. Patient will increase FOTO score by 22 pts, 66/100, to show improved functional mobility and QOL.   Progressing 62/100      PLAN  [x]  Upgrade activities as tolerated     [x]  Continue plan of care  []  Update interventions per flow sheet       []  Discharge due to:_  []  Other:_      Jennifer Capone, PT, LPTA 5/17/2019  3:34 PM    Future Appointments   Date Time Provider Dex Alysia   5/20/2019  1:00 PM Tram Becerra [facility]   5/21/2019 11:00 AM MD DUKE King SCHED   5/22/2019 12:00 PM Zahida Saunders, PT [facility]   5/24/2019  1:30 PM Sarah Dickinson, PTA [facility]   5/28/2019  1:30 PM Sarah Dickinson, PTA [facility]   5/30/2019  1:00 PM Sarah Dickinson, PTA [facility]   5/31/2019 11:00 AM Luis Louie [facility]   6/3/2019  1:00 PM Zahida Saunders, PT [facility]   6/5/2019 11:00 AM Sarah Dickinson, PTA [facility]   6/7/2019 10:00 AM Taylor Villarreal MD [facility] [address]   6/7/2019  1:00 PM Sarah Dickinson, PTA [facility]   6/18/2019 11:15 AM MD DUKE King SCHED   6/26/2019 11:00 AM [facility] [address]   7/22/2019  8:30 AM Jared Hsu  [address]   10/2/2019  1:00 PM Zac Chinchilla MD [address] [facility]

## 2019-05-17 NOTE — PROGRESS NOTES
[facility]  [address] [place] [address]  [phone] fax    Medicare Progress Report    Patient name: Osmani Johnson Start of Care: 2019   Referral source: Judah Lopez MD : 1955               Medical Diagnosis: Other specified postprocedural states [Z98.890]  Presence of left artificial knee joint [Z96.652]  Payor: [place] MEDICARE / Plan: VA MEDICARE PART A & B / Product Type: Medicare /  Onset Date:3/29/2019               Treatment Diagnosis: right shoulder pain and left knee pain   Prior Hospitalization: see medical history Provider#: [id number]   Medications: Verified on Patient summary List    Comorbidities: arthritis, high blood pressure, prostate ca s/p sx, left TKA   Prior Level of Function: 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 Left knee is stlll stiff all the time\"        Current Status/ treatment goals Objective measures   1. Patient will increase PROM of right shoulder in all planes by 30 degrees to restore mobility for ease dressing. [] met                 [] not met  [x] progressing 53 degrees ER @ 45  50 degrees IR @ 45  88 degrees abduction  125 degrees flexion        2. Pt will achieve 110 deg AAROM flexion to restore over head mobility for reaching.        [x] met                 [] not met  [] progressing 120 degrees AAROM in supine   3. Patient will increase right shoulder strength to 4/5 to improve ease of daily tasks. [] met                 [] not met  [x] progressing Initiated ER strengthening in sidelying. UT hiking during AROM flexion. 4. Patient will increase FOTO score by 22 pts, 66/100, to show improved functional mobility and QOL.        [] met                 [] not met  [x] progressing 62/100     Key functional changes: PROM/AAROM/AROM      Problems/ barriers to goal attainment: GHJ hypomobility     Assessment / Recommendations:Patient making steady progress towards long term goals. His passive ROM is improving in all planes, but continues to presents with capsular tightness and decreased muscular flexibility. Patients FOTO score has improved by 18 pts and reports little difficulty reaching shelf at shoulder height. He presents with decreased right UE strength evident by UT hiking during AROM flexion < 90 degrees in standing. Patient presents with decreased hip strength with knee valgus collapse. Will continue to address ROM, strength, flexibility, and posture to improve ease of ADL's and dressing. Problem List: pain affecting function, decrease ROM, decrease strength, impaired gait/ balance, decrease ADL/ functional abilitiies, decrease activity tolerance and decrease flexibility/ joint mobility   Treatment Plan: Therapeutic exercise, Therapeutic activities, Neuromuscular re-education, Physical agent/modality, Gait/balance training, Manual therapy, Patient education, Self Care training and Functional mobility training    Patient Goal (s) has been updated and includes: improve ROM and strength     Updated Goals to be accomplished in 6-14 treatments:  1. Patient will increase PROM of right shoulder in all planes by 30 degrees to restore mobility for ease dressing  2. Patient will increase right shoulder strength to 4/5 to improve ease of daily tasks. 3. Patient will increase left hip strength to 4+/5 to improve kinetic chain alignment and reduce knee pain with ambulation. 4. Patient will increase functional IR ROM to level of L4 to improve ease of dressing. 5. Patient will increase FOTO score by 22 pts, 66/100, to show improved functional mobility and QOL.       Frequency / Duration: Patient to be seen 2-3 times per week for 6-14 treatments:      Jack Plunkett, PT 5/17/2019 1:39 PM

## 2019-05-20 ENCOUNTER — HOSPITAL ENCOUNTER (OUTPATIENT)
Dept: PHYSICAL THERAPY | Age: 64
Discharge: HOME | End: 2019-05-20
Payer: MEDICARE

## 2019-05-20 PROCEDURE — 97110 THERAPEUTIC EXERCISES: CPT

## 2019-05-20 PROCEDURE — 97016 VASOPNEUMATIC DEVICE THERAPY: CPT

## 2019-05-20 PROCEDURE — 97140 MANUAL THERAPY 1/> REGIONS: CPT

## 2019-05-20 NOTE — PROGRESS NOTES
PT DAILY TREATMENT NOTE 10-18    Patient Name: Jessica Kirkland  Date:2019  : 1955  [x]  Patient  Verified  Payor: VA MEDICARE / Plan: VA MEDICARE PART A & B / Product Type: Medicare /    In time:100  Out time:148  Total Treatment Time (min): 48  Visit #: 11 of     Medicare/BCBS Only   Total Timed Codes (min):  33 1:1 Treatment Time:  33       Treatment Area: Pain in left knee [M25.562]  Pain in right shoulder [M25.511]    SUBJECTIVE  Pain Level (0-10 scale): 1/10 sh, 2-3/10 knee  Any medication changes, allergies to medications, adverse drug reactions, diagnosis change, or new procedure performed?: [x] No    [] Yes (see summary sheet for update)  Subjective functional status/changes:   [] No changes reported  Pt stated that he has good and bad days.  Zeke Yeagern was a bad day, but today is a pretty good day    OBJECTIVE    Modality rationale: decrease inflammation and decrease pain to improve the patients ability to increase ease with ADLs   Min Type Additional Details    [] Estim:  []Unatt       []IFC  []Premod                        []Other:  []w/ice   []w/heat  Position:  Location:    [] Estim: []Att    []TENS instruct  []NMES                    []Other:  []w/US   []w/ice   []w/heat  Position:  Location:    []  Traction: [] Cervical       []Lumbar                       [] Prone          []Supine                       []Intermittent   []Continuous Lbs:  [] before manual  [] after manual    []  Ultrasound: []Continuous   [] Pulsed                           []1MHz   []3MHz W/cm2:  Location:    []  Iontophoresis with dexamethasone         Location: [] Take home patch   [] In clinic    []  Ice     []  heat  []  Ice massage  []  Laser   []  Anodyne Position:  Location:    []  Laser with stim  []  Other:  Position:  Location:   15 [x]  Vasopneumatic Device Pressure:       [x] lo [] med [] hi   Temperature: [x] lo [] med [] hi   [x] Skin assessment post-treatment:  [x]intact []redness- no adverse reaction    []redness - adverse reaction:     25 min Therapeutic Exercise:  [x] See flow sheet :   Rationale: increase ROM and increase strength to improve the patients ability to increase ease with ADLs    8 min Manual Therapy:  PROM with oscillations and GH mobs   Rationale: decrease pain, increase ROM and increase tissue extensibility to increase ease with ADLs    With   [x] TE   [] TA   [] neuro   [] other: Patient Education: [x] Review HEP    [] Progressed/Changed HEP based on:   [] positioning   [] body mechanics   [] transfers   [] heat/ice application    [] other:      Other Objective/Functional Measures:   Had no complaint of increased pain with exercises  Wall wipes cont to be challenging  Had improved range of motion in supine with wand flex and ER  Towel stretch cont to be challenging  Had fair flex ROM with pulleys     Pain Level (0-10 scale) post treatment: 0/10    ASSESSMENT/Changes in Function:   Pt is slowly progressing toward goals. Pt cont with decreased strength and range of motion in right sh. Left knee cont to be painful at times. Cont with decreased left knee range of motion and strength    Patient will continue to benefit from skilled PT services to modify and progress therapeutic interventions, address functional mobility deficits, address ROM deficits and address strength deficits to attain remaining goals. []  See Plan of Care  [x]  See progress note/recertification  []  See Discharge Summary         Progress towards goals / Updated goals:  1. Patient will increase PROM of right shoulder in all planes by 30 degrees to restore mobility for ease dressing  2. Patient will increase right shoulder strength to 4/5 to improve ease of daily tasks. 3. Patient will increase left hip strength to 4+/5 to improve kinetic chain alignment and reduce knee pain with ambulation. 4. Patient will increase functional IR ROM to level of L4 to improve ease of dressing.    5. Patient will increase FOTO score by 22 pts, 66/100, to show improved functional mobility and QOL.     PLAN  []  Upgrade activities as tolerated     [x]  Continue plan of care  []  Update interventions per flow sheet       []  Discharge due to:_  []  Other:_      Marky WillyBIRGIT 5/20/2019  1:02 PM    Future Appointments   Date Time Provider Dex [place]   5/21/2019 11:00 AM MD DUKE Pinto SCHED   5/22/2019 12:00 PM Emanuel Chanel, PT [facility]   5/24/2019  1:30 PM Dayanna Marier, PTA [facility]   5/28/2019  1:30 PM Dayanna Marier, PTA [facility]   5/30/2019  1:00 PM Dayanna Kemp, PTA [facility]   5/31/2019 11:00 AM Chadwick Vera [facility]   6/3/2019  1:00 PM Devin Mackenzie, PT [facility]   6/5/2019 11:00 AM Daaynna Marier, PTA [facility]   6/7/2019 10:00 AM Corry Rapp MD [facility] [address]   6/7/2019  1:00 PM Dayanna Kemp, PTA [facility]   6/18/2019 11:15 AM MD DU[facility] Pinto SCHED   6/26/2019 11:00 AM [facility] [address]   7/22/2019  8:30 AM Jaquelin Phan  [address]   10/2/2019  1:00 PM Bere Chinchilla MD [address]

## 2019-05-21 ENCOUNTER — OFFICE VISIT (OUTPATIENT)
Dept: FAMILY MEDICINE CLINIC | Facility: CLINIC | Age: 64
End: 2019-05-21

## 2019-05-21 ENCOUNTER — TELEPHONE (OUTPATIENT)
Dept: ORTHOPEDIC SURGERY | Age: 64
End: 2019-05-21

## 2019-05-21 VITALS
SYSTOLIC BLOOD PRESSURE: 128 MMHG | HEART RATE: 80 BPM | BODY MASS INDEX: 32.93 KG/M2 | WEIGHT: 230 LBS | HEIGHT: 70 IN | TEMPERATURE: 98.4 F | OXYGEN SATURATION: 96 % | DIASTOLIC BLOOD PRESSURE: 70 MMHG

## 2019-05-21 DIAGNOSIS — Z00.00 MEDICARE ANNUAL WELLNESS VISIT, SUBSEQUENT: Primary | ICD-10-CM

## 2019-05-21 DIAGNOSIS — Z86.718 HISTORY OF DVT OF LOWER EXTREMITY: ICD-10-CM

## 2019-05-21 DIAGNOSIS — M79.641 BILATERAL HAND PAIN: Primary | ICD-10-CM

## 2019-05-21 DIAGNOSIS — M62.838 MUSCLE SPASM: ICD-10-CM

## 2019-05-21 DIAGNOSIS — Z13.31 DEPRESSION SCREENING: ICD-10-CM

## 2019-05-21 DIAGNOSIS — R60.0 BILATERAL LOWER EXTREMITY EDEMA: ICD-10-CM

## 2019-05-21 DIAGNOSIS — M79.642 BILATERAL HAND PAIN: Primary | ICD-10-CM

## 2019-05-21 DIAGNOSIS — I10 ESSENTIAL HYPERTENSION: ICD-10-CM

## 2019-05-21 RX ORDER — TRIAMCINOLONE ACETONIDE 1 MG/G
CREAM TOPICAL
Qty: 15 G | Refills: 0 | Status: SHIPPED | OUTPATIENT
Start: 2019-05-21 | End: 2019-06-18 | Stop reason: ALTCHOICE

## 2019-05-21 NOTE — TELEPHONE ENCOUNTER
Rainey Krabbe (Spouse on HIPPA) called regarding her 's prescription Celebrex 200mg he was receiving through [facility]. She is asking if he can get a refill sent over to his [facility], [address] [place] [address] Avenue. She also asked about him receiving physical therapy for both hands. She said that he went to his PCP office today and Dr. Mery Jimenez said that since he is already receiving physical therapy for his shoulder it shouldn't be an issue to start with his hands as well. Please advise Susan at [phone].

## 2019-05-21 NOTE — PROGRESS NOTES
Lucian Sosa presents today for   Chief Complaint   Patient presents with    Results     labs    Foot Swelling     right    Rash     right        Lucian Swan preferred language for health care discussion is english. Is someone accompanying this pt? Yes wife    Is the patient using any DME equipment during [address]? no    Depression Screening:  3 most recent PHQ Screens 5/21/2019   PHQ Not Done -   Little interest or pleasure in doing things Not at all   Feeling down, depressed, irritable, or hopeless Not at all   Total Score PHQ 2 0       Learning Assessment:  Learning Assessment 2/8/2019   PRIMARY LEARNER Patient   HIGHEST LEVEL OF EDUCATION - PRIMARY LEARNER  -   BARRIERS PRIMARY LEARNER -   [facility]    NEED -   LEARNER PREFERENCE PRIMARY DEMONSTRATION   ANSWERED BY Patient   RELATIONSHIP SELF       Abuse Screening:  Abuse Screening Questionnaire 11/6/2018   Do you ever feel afraid of your partner? N   Are you in a relationship with someone who physically or mentally threatens you? N   Is it safe for you to go home? Y       Fall Risk  Fall Risk Assessment, last 12 mths 5/21/2019   Able to walk? Yes   Fall in past 12 months? No   Fall with injury? -   Number of falls in past 12 months -   Fall Risk Score -       Health Maintenance reviewed and discussed and ordered per Provider. Health Maintenance Due   Topic Date Due    Hepatitis C Screening  1955    Shingrix Vaccine Age 50> (1 of 2) 04/13/2003    GLAUCOMA SCREENING Q2Y  04/13/2018    Pneumococcal 65+ years (1 of 2 - PCV13) 04/13/2018    MEDICARE YEARLY EXAM  05/02/2018   . Lucian Sosa is updated on all     Pt currently taking Antiplatelet therapy? no    Coordination of Care:  1. Have you been to the ER, urgent care clinic since your last visit? no Hospitalized since your last visit? no    2.  Have you seen or consulted any other health care providers outside of the [facility] since your last visit? no Include any pap smears or colon screening.  no

## 2019-05-22 ENCOUNTER — HOSPITAL ENCOUNTER (OUTPATIENT)
Dept: PHYSICAL THERAPY | Age: 64
Discharge: HOME | End: 2019-05-22
Payer: MEDICARE

## 2019-05-22 PROCEDURE — 97140 MANUAL THERAPY 1/> REGIONS: CPT

## 2019-05-22 PROCEDURE — 97110 THERAPEUTIC EXERCISES: CPT

## 2019-05-22 PROCEDURE — 97016 VASOPNEUMATIC DEVICE THERAPY: CPT

## 2019-05-22 RX ORDER — CELECOXIB 50 MG/1
50 CAPSULE ORAL 2 TIMES DAILY
Qty: 60 CAP | Refills: 1 | Status: CANCELLED | OUTPATIENT
Start: 2019-05-22

## 2019-05-22 RX ORDER — CELECOXIB 200 MG/1
200 CAPSULE ORAL 2 TIMES DAILY
Qty: 60 CAP | Refills: 2 | Status: SHIPPED | OUTPATIENT
Start: 2019-05-22 | End: 2019-08-08 | Stop reason: ALTCHOICE

## 2019-05-22 NOTE — PROGRESS NOTES
PT DAILY TREATMENT NOTE 10-18 Patient Name: Slime Mcgill Date:2019 : 1955 [x]  Patient  Verified Payor: VA MEDICARE / Plan: Emely Gu [facility] / Product Type: Medicare / In time:1200  Out time:100 Total Treatment Time (min): 60 Visit #: 12 of  Medicare/BCBS Only Total Timed Codes (min):  60 1:1 Treatment Time:  45  
 
 
Treatment Area: Pain in left knee [M25.562] Pain in right shoulder [M25.511] SUBJECTIVE Pain Level (0-10 scale): 1/10 Any medication changes, allergies to medications, adverse drug reactions, diagnosis change, or new procedure performed?: [x] No    [] Yes (see summary sheet for update) Subjective functional status/changes:   [] No changes reported MD ordering a vascular test on his right foot due to swelling. He is off/on on fluid pills. Good report from general check up Reports he did some yard work this morning and a little sore now. His shoulder is coming along but his knee is on/off depending on the day. OBJECTIVE Modality rationale: decrease pain to improve the patients ability to ease with ADL's  
Min Type Additional Details  
 [] Estim:  []Unatt       []IFC  []Premod []Other:  []w/ice   []w/heat Position: Location:  
 [] Estim: []Att    []TENS instruct  []NMES []Other:  []w/US   []w/ice   []w/heat Position: Location:  
 []  Traction: [] Cervical       []Lumbar 
                     [] Prone          []Supine []Intermittent   []Continuous Lbs: 
[] before manual 
[] after manual  
 []  Ultrasound: []Continuous   [] Pulsed []1MHz   []3MHz W/cm2: 
Location:  
 []  Iontophoresis with dexamethasone Location: [] Take home patch  
[] In clinic  
 []  Ice     []  heat 
[]  Ice massage 
[]  Laser  
[]  Anodyne Position: Location:  
 []  Laser with stim 
[]  Other:  Position: Location: 15 []  Vasopneumatic Device Pressure:       [x] lo [] med [] hi  
Temperature: [x] lo [] med [] hi  
[] Skin 37 min Therapeutic Exercise:  [] See flow sheet :  
Rationale: increase ROM and increase strength to improve the patients ability to ease with ADL's 
 
8 min Manual Therapy:  PROM, jt mobs, subscap TPR. Rationale: decrease pain, increase ROM, increase tissue extensibility, correct positional vertigo and decrease trigger points to ease with ADL's With 
 [] TE 
 [] TA 
 [] neuro 
 [] other: Patient Education: [x] Review HEP [] Progressed/Changed HEP based on:  
[] positioning   [] body mechanics   [] transfers   [] heat/ice application   
[] other:   
 
Other Objective/Functional Measures: reports tightness at end range flexion during wall wipes. .   
Cues to maintain elbows to side during ER with cane. Left IT band tightness, right subscap Trigger points. Pain Level (0-10 scale) post treatment: 37 
 
ASSESSMENT/Changes in Function: Progressing well. Reports his yard work is very easy, he is not over doing it. He will be getting his LE checked for blood clots due to Patient will continue to benefit from skilled PT services to modify and progress therapeutic interventions, address functional mobility deficits, address ROM deficits, address strength deficits, analyze and address soft tissue restrictions, analyze and cue movement patterns, analyze and modify body mechanics/ergonomics, assess and modify postural abnormalities, address imbalance/dizziness and instruct in home and community integration to attain remaining goals. [x]  See Plan of Care 
[]  See progress note/recertification 
[]  See Discharge Summary Progress towards goals / Updated goals: 
1. Patient will increase PROM of right shoulder in all planes by 30 degrees to restore mobility for ease dressing 2. Patient will increase right shoulder strength to 4/5 to improve ease of daily tasks. 3. Patient will increase left hip strength to 4+/5 to improve kinetic chain alignment and reduce knee pain with ambulation. 4. Patient will increase functional IR ROM to level of L4 to improve ease of dressing. 5. Patient will increase FOTO score by 22 pts, 66/100, to show improved functional mobility and QOL.   
 
PLAN [x]  Upgrade activities as tolerated     [x]  Continue plan of care 
[]  Update interventions per flow sheet      
[]  Discharge due to:_ 
[]  Other:_ Irma Mccollum, PT 5/22/2019  12:06 PM 
 
Future Appointments Date Time Provider Dex Alysia 5/24/2019  1:30 PM Nakita Reid, PTA [facility]  
5/28/2019  1:30 PM Nakita Reid, PTA [facility]  
5/30/2019  1:00 PM Nakita Reid, PTA [facility]  
5/31/2019 11:00 AM Pietro Taylor [facility]  
6/3/2019  1:00 PM Clare Nolen, PT [facility]  
6/5/2019 11:00 AM Nakita Reid[facility]  
6/7/2019 10:00 AM Renata Leong MD [address]  
6/7/2019  1:00 PM Jose Enrique Milan [facility]  
6/18/2019 11:15 AM MD DUKE Navarro SCHED  
6/26/2019 11:00 AM [facility]  
7/22/2019  8:30 AM Donnell Shearer  [address]  
8/29/2019  4:00 PM MD DUKE Navarro [place] SCHED  
10/2/2019  1:00 PM Alli Chinchilla MD [address]

## 2019-05-23 ENCOUNTER — HOSPITAL ENCOUNTER (OUTPATIENT)
Dept: VASCULAR SURGERY | Age: 64
Discharge: HOME | End: 2019-05-23
Attending: INTERNAL MEDICINE
Payer: MEDICARE

## 2019-05-23 DIAGNOSIS — Z86.718 HISTORY OF DVT OF LOWER EXTREMITY: ICD-10-CM

## 2019-05-23 PROCEDURE — 93970 EXTREMITY STUDY: CPT

## 2019-05-23 NOTE — PROGRESS NOTES
This is the Subsequent Medicare Annual Wellness Exam, performed 12 months or more after the Initial AWV or the last Subsequent AWV    I have reviewed the patient's medical history in detail and updated the computerized patient record. History   The patient remains on lisinopril HCT for hypertension. He is doing well on the medication. The patient remains on Coumadin for chronic DVT. His pro times have been doing well. The patient is status post right shoulder surgery. The right shoulder is doing well. The patient is status post a fall. This patient's right shoulder is doing well after the fall. The patient complains of bilateral lower extremity edema which is developed over the past several months. The left leg slightly worse than right. The patient states he has a family history of \"venous reflux. Patient has had chronic DVT in his legs. The question of venous reflux needs to be answered. The patient declines all vaccinations.       Past Medical History:   Diagnosis Date    Arthritis     Bleeding     Chronic pain     knee and shoulder    GERD (gastroesophageal reflux disease)     Headache(784.0)     migraine    High cholesterol     Hypertension     Lower back pain 11/6/2010    Other chest pain     Pure hypercholesterolemia     Right buttock pain 11/6/2010    Right foot pain     Rotator cuff tear     left-since 2010, worsened tear Young.    Rotator cuff tear, right     Spinal stenosis     Tendonitis, tibialis     anterior    Thromboembolus ([address]     3 after sx last one 2000      Past Surgical History:   Procedure Laterality Date    FOOT/TOES SURGERY PROC UNLISTED      HX BACK SURGERY      HX HEENT Right 09/2018    eye surgery, macular     HX KNEE REPLACEMENT Left     HX ORTHOPAEDIC  06-25-12    Right foot with excision of bursa and adipose tissue from fifth metatarsal base by Dr. Harshil Sahni      lower back (1992 and 2000)    HX OTHER SURGICAL      left foot (2008)    HX OTHER SURGICAL      Retina repair     HX PROSTATECTOMY  11/2018    HX ROTATOR CUFF REPAIR Right 01/28/2019    by Dr. Isaias Díaz       Current Outpatient Medications   Medication Sig Dispense Refill    triamcinolone acetonide (KENALOG) 0.1 % topical cream Apply twice per day 15 g 0    montelukast (SINGULAIR) 10 mg tablet TAKE 1 TABLET BY MOUTH EVERY DAY 90 Tab 0    butalbital-acetaminophen-caff (FIORICET) -40 mg per capsule Take 1 Cap by mouth every eight (8) hours as needed for Pain. 90 Cap 1    diclofenac (VOLTAREN) 1 % gel Apply 4 g to affected area four (4) times daily. Maximum 16 grams per joint per day. Dispense 5 100 gram tubes 5 Each 0    lisinopril-hydroCHLOROthiazide (PRINZIDE, ZESTORETIC) 20-12.5 mg per tablet TAKE 1 TABLET BY MOUTH DAILY 90 Tab 0    celecoxib (CELEBREX) 50 mg capsule Take 50 mg by mouth two (2) times a day.  labetalol (NORMODYNE) 100 mg tablet TAKE ONE TABLET BY MOUTH TWICE DAILY 180 Tab 0    metaxalone (SKELAXIN) 800 mg tablet Take 1 Tab by mouth three (3) times daily. Indications: muscle spasm 90 Tab 2    raNITIdine (ZANTAC) 150 mg tablet TK 1 T PO HS  1    ALPRAZolam (XANAX) 0.5 mg tablet Take one half(1/2) tab to one(1) tab by mouth at bedtime as needed for sleep 30 Tab 0    lansoprazole (PREVACID) 30 mg capsule TAKE 1 CAPSULE DAILY BEFOREBREAKFAST 90 Cap 3    warfarin (COUMADIN) 5 mg tablet TAKE 2 AND 1/2 TABLETS BY MOUTH DAILY OR AS DIRECTED 75 Tab 0    warfarin (COUMADIN) 3 mg tablet Or as directed 30 Tab 3    montelukast (SINGULAIR) 10 mg tablet TAKE 1 TABLET BY MOUTH EVERY DAY (Patient taking differently: TAKE 1 TABLET BY MOUTH EVERY HS) 90 Tab 0    acetaminophen (TYLENOL) 500 mg tablet Take 1,000 mg by mouth every six (6) hours as needed for Pain.  celecoxib (CELEBREX) 200 mg capsule Take 1 Cap by mouth two (2) times a day for 90 days.  60 Cap 2     Allergies   Allergen Reactions    Amoxicillin Itching  Augmentin [Amoxicillin-Pot Clavulanate] Itching    Chlorhexidine Towelette Itching    Hibiclens [Chlorhexidine Gluconate] Itching    Milk Containing Products Diarrhea    Penicillins Rash    Requip [Ropinirole] Nausea and Vomiting     Family History   Problem Relation Age of Onset   [address] [facility] Arthritis-rheumatoid Mother     Dementia Mother     Heart Disease Father     Cancer Father     Hypertension Other     Cancer Brother      Social History     Tobacco Use    Smoking status: Never Smoker    Smokeless tobacco: Never Used   Substance Use Topics    Alcohol use: No     Patient Active Problem List   Diagnosis Code    Esophageal reflux K21.9    Pure hypercholesterolemia E78.00    Personal history of venous thrombosis and embolism Z86.718    Facet arthropathy, lumbar M47.816    DDD (degenerative disc disease), lumbar M51.36    Essential hypertension I10    Bronchitis J40    Chronic tension-type headache, not intractable G44.229    Lumbar spinal stenosis M48.061    Abdominal bloating R14.0    Myofascial pain M79.18    Cervical facet joint syndrome M47.812    Muscle spasm M62.838    Warfarin anticoagulation Z79.01    Muscle spasm of back M62.830    Primary osteoarthritis of left knee M17.12    Arthritis of knee M17.10    Prostate cancer ([address] C61    Prostate CA ([address] C61    Malignant neoplasm of prostate ([facility] [address] C61       Depression Risk Factor Screening:     3 most recent PHQ Screens 5/21/2019   PHQ Not Done -   Little interest or pleasure in doing things Not at all   Feeling down, depressed, irritable, or hopeless Not at all   Total Score PHQ 2 0     Alcohol Risk Factor Screening: You do not drink alcohol or very rarely. Functional Ability and Level of Safety:   Hearing Loss  Hearing is good. Activities of Daily Living  The home contains: no safety equipment. Patient does total self care    Fall Risk  Fall Risk Assessment, last 12 mths 5/21/2019   Able to walk?  Yes   Fall in past 15 months? No   Fall with injury? -   Number of falls in past 12 months -   Fall Risk Score -       Abuse Screen  Patient is not abused    Cognitive Screening   Evaluation of Cognitive Function:  Has your family/caregiver stated any concerns about your memory: no  Normal    Patient Care Team   Patient Care Team:  Talha Rodríguez MD as PCP - General (Internal Medicine)  Titus Wu MD as Physician (Urology)    Assessment/Plan   Education and counseling provided:  Are appropriate based on today's review and evaluation    Diagnoses and all orders for this visit:    1. Medicare annual wellness visit, subsequent    2. History of DVT of lower extremity  -     DUPLEX LOWER EXT VENOUS BILAT; Future    3. Essential hypertension    4. Depression screening    5. Bilateral lower extremity edema    Other orders  -     triamcinolone acetonide (KENALOG) 0.1 % topical cream; Apply twice per day        Health Maintenance Due   Topic Date Due    Hepatitis C Screening  1955    Shingrix Vaccine Age 50> (1 of 2) 04/13/2003    GLAUCOMA SCREENING Q2Y  04/13/2018    Pneumococcal 65+ years (1 of 2 - PCV13) 04/13/2018    MEDICARE YEARLY EXAM  05/02/2018       Duplex of both lower extremities looking for venous reflux was ordered. he was advised to continue his maintenance medications      Follow-up and Dispositions    · Return in about 3 months (around 8/21/2019). I asked Salma Sorto if he has any questions and I answered the questions.   Salma Sorto states that he understands the treatment plan and agrees with the treatment plan

## 2019-05-24 ENCOUNTER — HOSPITAL ENCOUNTER (OUTPATIENT)
Dept: PHYSICAL THERAPY | Age: 64
Discharge: HOME | End: 2019-05-24
Payer: MEDICARE

## 2019-05-24 PROCEDURE — 97140 MANUAL THERAPY 1/> REGIONS: CPT

## 2019-05-24 PROCEDURE — 97016 VASOPNEUMATIC DEVICE THERAPY: CPT

## 2019-05-24 PROCEDURE — 97110 THERAPEUTIC EXERCISES: CPT

## 2019-05-24 NOTE — PROGRESS NOTES
PT DAILY TREATMENT NOTE 10-18    Patient Name: Rehana Purdy  Date:2019  : 1955  [x]  Patient  Verified  Payor: Monroe Zayas / Plan: [place] MEDICARE PART A & B / Product Type: Medicare /    In time: 1:30  Out time: 2:26  Total Treatment Time (min): 56  Visit #: 15 of - (MD signed PN on 19)    Medicare/BCBS Only   Total Timed Codes (min):  56 1:1 Treatment Time: 34       Treatment Area: Pain in left knee [M25.562]  Pain in right shoulder [M25.511]    SUBJECTIVE  Pain Level (0-10 scale): 1/10 right shoulder, 2-3 left LE  Any medication changes, allergies to medications, adverse drug reactions, diagnosis change, or new procedure performed?: [x] No    [] Yes (see summary sheet for update)  Subjective functional status/changes:   [] No changes reported  The swelling is about gone in my leg. It is still in my foot so the doctor has me do another [address] [facility] [facility] for blood clots. I had it done yesterday and he said no clots but that I had venous insufficiency. I will need to see a vascular doctor but I am not doing that right now. I did some yard work this morning. I did some light hedge trimming at a lower level and I am a bit sore.      OBJECTIVE    Modality rationale: decrease inflammation and decrease pain to improve the patients ability to increase ease with ADLs   Min Type Additional Details    [] Estim:  []Unatt       []IFC  []Premod                        []Other:  []w/ice   []w/heat  Position:  Location:    [] Estim: []Att    []TENS instruct  []NMES                    []Other:  []w/US   []w/ice   []w/heat  Position:  Location:    []  Traction: [] Cervical       []Lumbar                       [] Prone          []Supine                       []Intermittent   []Continuous Lbs:  [] before manual  [] after manual    []  Ultrasound: []Continuous   [] Pulsed                           []1MHz   []3MHz W/cm2:  Location:    []  Iontophoresis with dexamethasone         Location: [] Take home patch   [] In clinic []  Ice     []  heat  []  Ice massage  []  Laser   []  Anodyne Position:  Location:    []  Laser with stim  []  Other:  Position:  Location:   15 [x]  Vasopneumatic Device Pressure:       [x] lo [] med [] hi   Temperature: [x] lo [] med [] hi   [x] Skin assessment post-treatment:  [x]intact []redness- no adverse reaction    []redness - adverse reaction:     33/26 min Therapeutic Exercise:  [x] See flow sheet :   Rationale: increase ROM and increase strength to improve the patients ability to increase ease with ADLs    8 min Manual Therapy:  PROM with oscillations and GH mobs   Rationale: decrease pain, increase ROM and increase tissue extensibility to increase ease with ADLs    With   [x] TE   [] TA   [] neuro   [] other: Patient Education: [x] Review HEP    [] Progressed/Changed HEP based on:   [] positioning   [] body mechanics   [] transfers   [] heat/ice application    [] other:      Other Objective/Functional Measures:   Challenged with wall wipes, sore from yard work  Limited ROM with IR towel S  Ms fatigue with LAQ machine 10#    Pain Level (0-10 scale) post treatment: 0/10    ASSESSMENT/Changes in Function:   Patient with increased shoulder tightness and ms guarding during MT, likely due to soreness from overworking the shoulder doing yard work this morning. He is progressing slowly towards his goals and demonstrates right sh. IR behind back to right PSIS. Patient will continue to benefit from skilled PT services to modify and progress therapeutic interventions, address functional mobility deficits, address ROM deficits and address strength deficits to attain remaining goals. []  See Plan of Care  [x]  See progress note/recertification  []  See Discharge Summary         Progress towards goals / Updated goals:  1. Patient will increase PROM of right shoulder in all planes by 30 degrees to restore mobility for ease dressing  2.  Patient will increase right shoulder strength to 4/5 to improve ease of daily tasks. 3. Patient will increase left hip strength to 4+/5 to improve kinetic chain alignment and reduce knee pain with ambulation. 4. Patient will increase functional IR ROM to level of L4 to improve ease of dressing. Current: Pt able to bring right hand to right PSIS. 5/24/19  5. Patient will increase FOTO score by 22 pts, 66/100, to show improved functional mobility and QOL.     PLAN  []  Upgrade activities as tolerated     [x]  Continue plan of care  []  Update interventions per flow sheet       []  Discharge due to:_  []  Other:_      CECELIA Boateng 5/24/2019  2:26 PM    Future Appointments   Date Time Provider Dex Hazeli   5/24/2019  1:30 PM Carlos Morataya KXAKTEN [facility]   5/28/2019  1:30 PM Melissa Zamarripa PTA MMCPTPB [facility]   5/30/2019  1:00 PM Lashanda Deluca [id number] [facility]   5/31/2019 11:00 AM Loretta Bush MMCPTPB [facility]   6/3/2019  1:00 PM Sonia Haywood, GALILEO MMCPTPB [facility]   6/5/2019 11:00 AM Melissa Zamarripa PTA MMCPTPB [facility]   6/7/2019 10:00 AM Domenico Darby MD [facility] [address]   6/7/2019  1:00 PM Melissa Zamarripa PTA MMCPTPB [facility]   6/18/2019 11:15 AM MD [facility] SCHED   6/26/2019 11:00 AM [facility] [address]   7/22/2019  8:30 AM Zach Carmona  [address]   8/29/2019  4:00 PM MD [facility] SCHED   10/2/2019  1:00 PM Vladimir Chinchilla MD [address] [facility]

## 2019-05-28 ENCOUNTER — HOSPITAL ENCOUNTER (OUTPATIENT)
Dept: PHYSICAL THERAPY | Age: 64
Discharge: HOME | End: 2019-05-28
Payer: MEDICARE

## 2019-05-28 PROCEDURE — 97016 VASOPNEUMATIC DEVICE THERAPY: CPT

## 2019-05-28 PROCEDURE — 97140 MANUAL THERAPY 1/> REGIONS: CPT

## 2019-05-28 PROCEDURE — 97110 THERAPEUTIC EXERCISES: CPT

## 2019-05-28 NOTE — PROGRESS NOTES
PT DAILY TREATMENT NOTE 10-18    Patient Name: Buck August  Date:2019  : 1955  [x]  Patient  Verified  Payor: [place] MEDICARE / Plan: [place] MEDICARE PART A & B / Product Type: Medicare /    In time:130  Out time:222  Total Treatment Time (min): 52  Visit #: 14 of     Medicare/BCBS Only   Total Timed Codes (min):  37 1:1 Treatment Time:  37       Treatment Area: Pain in left knee [M25.562]  Pain in right shoulder [M25.511]    SUBJECTIVE  Pain Level (0-10 scale): 1/10 sh, 2-3/10 knee  Any medication changes, allergies to medications, adverse drug reactions, diagnosis change, or new procedure performed?: [x] No    [] Yes (see summary sheet for update)  Subjective functional status/changes:   [] No changes reported  Pt stated that he is doing well today    OBJECTIVE    Modality rationale: decrease inflammation and decrease pain to improve the patients ability to increase ease with ADLs   Min Type Additional Details    [] Estim:  []Unatt       []IFC  []Premod                        []Other:  []w/ice   []w/heat  Position:  Location:    [] Estim: []Att    []TENS instruct  []NMES                    []Other:  []w/US   []w/ice   []w/heat  Position:  Location:    []  Traction: [] Cervical       []Lumbar                       [] Prone          []Supine                       []Intermittent   []Continuous Lbs:  [] before manual  [] after manual    []  Ultrasound: []Continuous   [] Pulsed                           []1MHz   []3MHz W/cm2:  Location:    []  Iontophoresis with dexamethasone         Location: [] Take home patch   [] In clinic    []  Ice     []  heat  []  Ice massage  []  Laser   []  Anodyne Position:  Location:    []  Laser with stim  []  Other:  Position:  Location:   15 [x]  Vasopneumatic Device Pressure:       [x] lo [] med [] hi   Temperature: [x] lo [] med [] hi   [x] Skin assessment post-treatment:  [x]intact []redness- no adverse reaction    []redness - adverse reaction:     29 min Therapeutic Exercise:  [x] See flow sheet :   Rationale: increase ROM and increase strength to improve the patients ability to increase ease with ADLs    8 min Manual Therapy:  PROM with oscillations and GH mobs   Rationale: decrease pain, increase ROM and increase tissue extensibility to increase ease with ADLs    With   [x] TE   [] TA   [] neuro   [] other: Patient Education: [x] Review HEP    [] Progressed/Changed HEP based on:   [] positioning   [] body mechanics   [] transfers   [] heat/ice application    [] other:      Other Objective/Functional Measures:   Had no difficulty with exercises  Cont with restricted PROM for all motions  No complaint of increased pain during session    Pain Level (0-10 scale) post treatment: 0/10    ASSESSMENT/Changes in Function:   Pt is slowly progressing toward goals. Strength and range of motion in right sh are slowly improving. Pt cont to complain of left knee pain. Patient will continue to benefit from skilled PT services to modify and progress therapeutic interventions, address functional mobility deficits, address ROM deficits and address strength deficits to attain remaining goals. []  See Plan of Care  [x]  See progress note/recertification  []  See Discharge Summary         Progress towards goals / Updated goals:  1. Patient will increase PROM of right shoulder in all planes by 30 degrees to restore mobility for ease dressing  2. Patient will increase right shoulder strength to 4/5 to improve ease of daily tasks. 3. Patient will increase left hip strength to 4+/5 to improve kinetic chain alignment and reduce knee pain with ambulation. 4. Patient will increase functional IR ROM to level of L4 to improve ease of dressing. Current: Pt able to bring right hand to right PSIS. 5/24/19  5. Patient will increase FOTO score by 22 pts, 66/100, to show improved functional mobility and QOL.     PLAN  []  Upgrade activities as tolerated     [x]  Continue plan of care  [] Update interventions per flow sheet       []  Discharge due to:_  []  Other:_      Jerry Evanss, PTA 5/28/2019  1:35 PM    Future Appointments   Date Time Provider Dex Alysia   5/30/2019  1:00 PM Julia Castillo [id number] [facility]   5/31/2019 11:00 AM Mel Morgan MMCPTPB [facility]   6/3/2019  1:00 PM Criselda Atkins, PT [facility]CPTP[facility]   6/5/2019 11:00 AM César Alvarado, PTA [facility]CPTP[facility]   6/7/2019 10:00 AM Carin Arreguin MD [facility] [address]   6/7/2019  1:00 PM César Alvarado, PTA MMCPTPB [facility]   6/18/2019 11:15 AM MD [facility] Mcclain SCHED   6/26/2019 11:00 AM [facility] [address]   7/22/2019  8:30 AM Roxanne Archuleta  [address]   8/29/2019  4:00 PM MD [facility] Mcclain SCHED   10/2/2019  1:00 PM Rupali Chinchilla MD [address]

## 2019-05-29 ENCOUNTER — TELEPHONE (OUTPATIENT)
Dept: FAMILY MEDICINE CLINIC | Facility: CLINIC | Age: 64
End: 2019-05-29

## 2019-05-29 RX ORDER — WARFARIN SODIUM 5 MG/1
TABLET ORAL
Qty: 75 TAB | Refills: 0 | Status: SHIPPED | OUTPATIENT
Start: 2019-05-29 | End: 2019-07-01 | Stop reason: SDUPTHER

## 2019-05-29 NOTE — TELEPHONE ENCOUNTER
Patient is calling  About a referral to see a vascular Doctor after his test where done and he has not heard anything from no one please advise thanks [phone]  He had rest done on 05/21/9

## 2019-05-30 ENCOUNTER — HOSPITAL ENCOUNTER (OUTPATIENT)
Dept: PHYSICAL THERAPY | Age: 64
Discharge: HOME | End: 2019-05-30
Payer: MEDICARE

## 2019-05-30 PROCEDURE — 97140 MANUAL THERAPY 1/> REGIONS: CPT

## 2019-05-30 PROCEDURE — 97110 THERAPEUTIC EXERCISES: CPT

## 2019-05-30 NOTE — PROGRESS NOTES
PT DAILY TREATMENT NOTE 10-18    Patient Name: Trent Blancas  Date:2019  : 1955  [x]  Patient  Verified  Payor: VA MEDICARE / Plan: VA MEDICARE PART A & B / Product Type: Medicare /    In time: 1:00 Out time: 1:45  Total Treatment Time (min): 45  Visit #: 15 of       Treatment Area: Pain in left knee [M25.562]  Pain in right shoulder [M25.511]    SUBJECTIVE  Pain Level (0-10 scale): 1- right shoulder, 2-left knee  Any medication changes, allergies to medications, adverse drug reactions, diagnosis change, or new procedure performed?: [x] No    [] Yes (see summary sheet for update)  Subjective functional status/changes:   [] No changes reported    OBJECTIVE    Modality rationale: decrease edema, decrease inflammation and decrease pain to improve the patients ability to return to full time work duties.    Min Type Additional Details    [] Estim:  []Unatt       []IFC  []Premod                        []Other:  []w/ice   []w/heat  Position:  Location:    [] Estim: []Att    []TENS instruct  []NMES                    []Other:  []w/US   []w/ice   []w/heat  Position:  Location:    []  Traction: [] Cervical       []Lumbar                       [] Prone          []Supine                       []Intermittent   []Continuous Lbs:  [] before manual  [] after manual    []  Ultrasound: []Continuous   [] Pulsed                           []1MHz   []3MHz W/cm2:  Location:    []  Iontophoresis with dexamethasone         Location: [] Take home patch   [] In clinic    []  Ice     []  heat  []  Ice massage  []  Laser   []  Anodyne Position:  Location:    []  Laser with stim  []  Other:  Position:  Location:   15 [x]  Vasopneumatic Device Pressure:       [] lo [x] med [] hi   Temperature: [x] lo [] med [] hi   [x] Skin assessment post-treatment:  [x]intact [x]redness- no adverse reaction    []redness - adverse reaction:       35 min Therapeutic Exercise:  [x] See flow sheet :   Rationale: increase ROM and increase strength to improve the patients ability to return to normal activities. 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 With   [x] TE   [] TA   [] neuro   [] other: Patient Education: [x] Review HEP    [] Progressed/Changed HEP based on:   [] positioning   [] body mechanics   [] transfers   [] heat/ice application    [x] other:      Other Objective/Functional Measures: Pt with restricted right shoulder flexion, scaption and ER. Pain Level (0-10 scale) post treatment: 1-right shoulder, 2-left knee    ASSESSMENT/Changes in Function: Pt reports that he is contacting his ortho MD regarding the left knee 2/2 wanting to return to full time work. Patient will continue to benefit from skilled PT services to address functional mobility deficits, address ROM deficits and address strength deficits to attain remaining goals. [x]  See Plan of Care  []  See progress note/recertification  []  See Discharge Summary         Progress towards goals / Updated goals:  1. Patient will increase PROM of right shoulder in all planes by 30 degrees to restore mobility for ease dressing  2. Patient will increase right shoulder strength to 4/5 to improve ease of daily tasks. 3. Patient will increase left hip strength to 4+/5 to improve kinetic chain alignment and reduce knee pain with ambulation. 4. Patient will increase functional IR ROM to level of L4 to improve ease of dressing.   Current: Pt able to bring right hand to right PSIS. 5/24/19  5. Patient will increase FOTO score by 22 pts, 66/100, to show improved functional mobility and QOL.         PLAN  []  Upgrade activities as tolerated     [x]  Continue plan of care  []  Update interventions per flow sheet       []  Discharge due to:_  []  Other:_      Blake  5/30/2019  9:57 AM    Future Appointments   Date Time Provider Dex Hatfield   5/30/2019  1:00 PM Lashanda Guevarad MMCPTPB [facility]   5/31/2019 11:00 AM Loretta Rachelle QTYDVZA [facility]   6/3/2019  1:00 PM Sonia Haywood, PT MMCPTPB [facility]   6/3/2019  2:30 PM Maria Esther Dawson, OTR/L MMCPTPB [facility]   6/5/2019 11:00 AM Melissa Zamarripa, PTA MMCPTPB [facility]   6/7/2019 10:00 AM Domenico Darby MD [facility] [address]   6/7/2019  1:00 PM Smita Cons MMCPTPB [facility]   6/18/2019 11:15 AM MD [facility] ROXANNE SCHED   6/26/2019 11:00 AM Kris [facility] [address]   7/22/2019  8:30 AM Zach Carmona  [address]   8/29/2019  4:00 PM MD [facility] ROXANNE SCHED   10/2/2019  1:00 PM Vladimir Chinchilla MD [address]

## 2019-05-31 ENCOUNTER — HOSPITAL ENCOUNTER (OUTPATIENT)
Dept: PHYSICAL THERAPY | Age: 64
Discharge: HOME | End: 2019-05-31
Payer: MEDICARE

## 2019-05-31 PROCEDURE — 97110 THERAPEUTIC EXERCISES: CPT

## 2019-05-31 PROCEDURE — 97016 VASOPNEUMATIC DEVICE THERAPY: CPT

## 2019-05-31 PROCEDURE — 97140 MANUAL THERAPY 1/> REGIONS: CPT

## 2019-05-31 NOTE — PROGRESS NOTES
PT DAILY TREATMENT NOTE 10-18    Patient Name: Elieser Storm  Date:2019  : 1955  [x]  Patient  Verified  Payor: [place] MEDICARE / Plan: [place] MEDICARE PART A & B / Product Type: Medicare /    In time:11:05  Out time:12:06  Total Treatment Time (min): 61  Visit #: 16 of     Medicare/BCBS Only   Total Timed Codes (min):  46 1:1 Treatment Time:  46       Treatment Area: Pain in left knee [M25.562]  Pain in right shoulder [M25.511]    SUBJECTIVE  Pain Level (0-10 scale): 2/10 rt shoulder, 3/10 left knee  Any medication changes, allergies to medications, adverse drug reactions, diagnosis change, or new procedure performed?: [x] No    [] Yes (see summary sheet for update)  Subjective functional status/changes:   [] No changes reported  Pt reports \" my pain is a little up today in the shoulder compared to usually it's a 1. I'm still having trouble raising my right arm overhead, that muscle just gets tight. My knee pain is just getting worse and I'm going to go back to the doc to get that re-evaluated. \"    OBJECTIVE    Modality rationale: decrease inflammation, decrease pain, increase tissue extensibility and increase muscle contraction/control to improve the patients ability to  tolerate exercises and return to daily lifestyle with ease.      Min Type Additional Details    [] Estim:  []Unatt       []IFC  []Premod                        []Other:  []w/ice   []w/heat  Position:  Location:    [] Estim: []Att    []TENS instruct  []NMES                    []Other:  []w/US   []w/ice   []w/heat  Position:  Location:    []  Traction: [] Cervical       []Lumbar                       [] Prone          []Supine                       []Intermittent   []Continuous Lbs:  [] before manual  [] after manual    []  Ultrasound: []Continuous   [] Pulsed                           []1MHz   []3MHz W/cm2:  Location:    []  Iontophoresis with dexamethasone         Location: [] Take home patch   [] In clinic    []  Ice     [] heat  []  Ice massage  []  Laser   []  Anodyne Position:  Location:    []  Laser with stim  []  Other:  Position:  Location:   15 [x]  Vasopneumatic Device Pressure:       [] lo [] med [] hi   Temperature: [] lo [] med [] hi   [] Skin assessment post-treatment:  []intact []redness- no adverse reaction    []redness - adverse reaction:          34 min Therapeutic Exercise:  [] See flow sheet :   Rationale: increase ROM and increase strength to improve the patients ability to to perform ADLs specifically related to dressing. 12 min Manual Therapy:  TRP, DTM to teres major and minor, GH PROM, GH mobs with oscillations    Rationale: decrease pain, increase ROM, increase tissue extensibility and decrease trigger points to improve mobility and ROM for ease of continuing daily lifestyle. With   [] TE   [] TA   [] neuro   [] other: Patient Education: [x] Review HEP    [] Progressed/Changed HEP based on:   [] positioning   [] body mechanics   [] transfers   [] heat/ice application    [] other:      Other Objective/Functional Measures:      Pain Level (0-10 scale) post treatment: 0-1/10    ASSESSMENT/Changes in Function: Mr. Ady Lopez is still experiencing pain in the left knee with little improvement. His shoulder is progressing in that he is able to perform more of his daily activities and hygiene care with less restriction. There is continued tightness to the teres muscles that restrict some of his shoulder flexion. TRP and gentle mobilizations provide relief and increase mobility.     Patient will continue to benefit from skilled PT services to modify and progress therapeutic interventions, address functional mobility deficits, address ROM deficits, address strength deficits, analyze and address soft tissue restrictions, analyze and cue movement patterns, analyze and modify body mechanics/ergonomics, assess and modify postural abnormalities, address imbalance/dizziness and instruct in home and community integration to attain remaining goals. [x]  See Plan of Care  []  See progress note/recertification  []  See Discharge Summary         Progress towards goals / Updated goals:  1. Patient will increase PROM of right shoulder in all planes by 30 degrees to restore mobility for ease dressing   Current: able to better dress himself, still has difficulty donning compression sock. Better able to shave, comb hair, and donning shirts 5/31/19   2. Patient will increase right shoulder strength to 4/5 to improve ease of daily tasks. 3. Patient will increase left hip strength to 4+/5 to improve kinetic chain alignment and reduce knee pain with ambulation. 4. Patient will increase functional IR ROM to level of L4 to improve ease of dressing.   Current: Pt able to bring right hand to right PSIS. 5/24/19  5. Patient will increase FOTO score by 22 pts, 66/100, to show improved functional mobility and QOL.            PLAN  []  Upgrade activities as tolerated     [x]  Continue plan of care  []  Update interventions per flow sheet       []  Discharge due to:_  []  Other:_      CECELIA Perez 5/31/2019  12:20 PM    Future Appointments   Date Time Provider Dex Hazeli   6/3/2019  1:00 PM Eber Nguyen, PT [id number]   6/3/2019  2:30 PM Nehal Kate OTR/L [facility]   6/5/2019 11:00 AM Geofm Me, PTA [facility]   6/7/2019 10:00 AM Nicolas Mccormick MD [facility] 69   6/7/2019  1:00 PM Geofm Me, PTA [facility]   6/18/2019 11:15 AM MD [facility] SCHED   6/26/2019 11:00 AM [facility] [facility]   7/22/2019  8:30 AM Irma Valle  [address]   8/29/2019  4:00 PM MD [facility] SCHED   10/2/2019  1:00 PM Ramon Chinchilla MD [address]

## 2019-06-03 ENCOUNTER — HOSPITAL ENCOUNTER (OUTPATIENT)
Dept: PHYSICAL THERAPY | Age: 64
Discharge: HOME | End: 2019-06-03
Payer: MEDICARE

## 2019-06-03 ENCOUNTER — TELEPHONE (OUTPATIENT)
Dept: VASCULAR SURGERY | Age: 64
End: 2019-06-03

## 2019-06-03 PROCEDURE — 97140 MANUAL THERAPY 1/> REGIONS: CPT

## 2019-06-03 PROCEDURE — 97110 THERAPEUTIC EXERCISES: CPT

## 2019-06-03 PROCEDURE — 97165 OT EVAL LOW COMPLEX 30 MIN: CPT

## 2019-06-03 PROCEDURE — 97018 PARAFFIN BATH THERAPY: CPT

## 2019-06-03 PROCEDURE — 97016 VASOPNEUMATIC DEVICE THERAPY: CPT

## 2019-06-03 NOTE — PROGRESS NOTES
[facility] [employer]  [address] [place] [address]  [phone] fax    Plan of Care/Statement of Necessity for Occupational Therapy Services    Patient name: Trent Blancas Start of Care: 6/3/2019   Referral source: Marie Lockett MD : 1955    Medical Diagnosis: Pain in right hand [M79.641]  Pain in left hand [M79.642]  Payor: VA MEDICARE / Plan: VA MEDICARE PART A & B / Product Type: Medicare /  Onset Date:6 months    Treatment Diagnosis: Pain both hands   Prior Hospitalization: see medical history Provider#: [id number]   Medications: Verified on Patient summary List    Comorbidities: TKR, back surgeries, prostate cancer, HTN, eye surgery   Prior Level of Function: I self care home care driving, works as  at [employer], used ot like hunting camping          The Plan of Care and following information is based on the information from the initial evaluation. Assessment/ key information: 75year old RHD male with prior hisotyr of thumb pain now experiencing angulaiton of digits as well as reduced  strength and pain. This has resulted in difficulty with self care such as fasteners, writing, opening jars and cans and lifting. 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 His  is reduced at 35# right and 40# left. His pinches right are 6-12#, left are 7-11#. He can achieve a full fist but has difficulty with hook fist particularly on right. His right thumb has limited radial abduction and is positioned in adduction which reduces grasp. His FOTO is 53/100 which indicated a moderate impairment in function. He will benefit from skilled occupational therapy to improve ROM strength and hand frunciton for self and home care tasks.       Evaluation Complexity: History LOW Complexity : Brief history review  Examination LOW Complexity : 1-3 performance deficits relating to physical, cognitive , or psychosocial skils that result in activity limitations and / or participation restrictions  Clile with muli year history of arthirtis but recent increase in symptoms of pain in both hands as well as onset of deformities in digits. nical Decision Making LOW Complexity : No comorbidities that affect functional and no verbal or physical assistance needed to complete eval tasks   Overall Complexity Rating: LOW     Problem List: Pain effecting function, Decreased range of motion, Decreased strength, Decreased coordination/prehension and Decreased ADL/functional abilities      Treatment Plan may include any combination of the following: Therapeutic exercise, Therapeutic activities, Physical agent/modality, Manual therapy, Splinting/orthoses, Patient education and ADLs/IADLs    Patient / Family readiness to learn indicated by: asking questions, trying to perform skills and interest    Persons(s) to be included in education:   patient (P)    Barriers to Learning/Limitations: None    Patient Goal (s): Better     Patient Self Reported Health Status: fair    Rehabilitation Potential: good    Short Term Goals: To be accomplished in 2 weeks:  1. Patient will report reduced pain in both hands with use of modalities in clinic. 2.  Patient will be independent in home program to increase thumb abduciton in right and both digit flexion. 3.  Patient will be introduced to joint protection strategies and adaptive equipment to improve self and home care and reduce pain. 4.  Patient will be independent in home program for hand strengthening. Long Term Goals: To be accomplished in 8 weeks:   1. Patient will report pain diminished to 0-2/10 with routine use  2. Patient will increase  strength in both hand by 10 pounds to increase ease of opening jars  3.   Patient will incorporate adaptive equipment and strategies to improve independence in self care tasks such as buttoning, cutting, writing etc.  4.  Patient will report improved performance in lifting and carrying tasks as shown by improved FOTO of at least points. Frequency / Duration: Patient to be seen 2 times per week for 8 weeks:    Patient/ Caregiver education and instruction: Diagnosis, prognosis, self care, activity modification and exercises  [x]  Plan of care has been reviewed with HARVEY    Certification Period: 6/3/19-8/29    Christiano Lopez OTR/L 6/3/2019 5:02 PM      ________________________________________________________________________    I certify that the above Therapy Services are being furnished while the patient is under my care. I agree with the treatment plan and certify that this therapy is necessary.     Physician's Signature:____________Date:_________TIME:________    ** Signature, Date and Time must be completed for valid certification **    Please sign and return to [facility] - [facility]   [address] [place] [address]  [phone] fax

## 2019-06-03 NOTE — PROGRESS NOTES
PT DAILY TREATMENT NOTE 10-18    Patient Name: Lucian Swan  Date:6/3/2019  : 1955  [x]  Patient  Verified  Payor: VA MEDICARE / Plan: VA MEDICARE PART A & B / Product Type: Medicare /    In time:100  Out time:214  Total Treatment Time (min): 74  Visit #: 17 of     Medicare/BCBS Only   Total Timed Codes (min):  60 1:1 Treatment Time: 60       Treatment Area: Pain in left knee [M25.562]  Pain in right shoulder [M25.511]    SUBJECTIVE  Pain Level (0-10 scale): 1/10 rt shoulder, 3/10 left knee  Any medication changes, allergies to medications, adverse drug reactions, diagnosis change, or new procedure performed?: [x] No    [] Yes (see summary sheet for update)  Subjective functional status/changes:   [] No changes reported  Patient reports he is pleased with the way his shoulder is coming along. He can shave, brush his teeth, comb his hair, but still has trouble with dressing. He is going to make follow up appointment with Jacob to check in with left knee because he continues to have pain and swelling in left foot, using compression sock. He had a doppler, which was negative for blood slot, but states he does had poor circulation, venous insufficiency last week. OBJECTIVE    Modality rationale: decrease inflammation, decrease pain, increase tissue extensibility and increase muscle contraction/control to improve the patients ability to  tolerate exercises and return to daily lifestyle with ease.      Min Type Additional Details    [] Estim:  []Unatt       []IFC  []Premod                        []Other:  []w/ice   []w/heat  Position:  Location:    [] Estim: []Att    []TENS instruct  []NMES                    []Other:  []w/US   []w/ice   []w/heat  Position:  Location:    []  Traction: [] Cervical       []Lumbar                       [] Prone          []Supine                       []Intermittent   []Continuous Lbs:  [] before manual  [] after manual    []  Ultrasound: []Continuous   [] Pulsed []1MHz   []3MHz W/cm2:  Location:    []  Iontophoresis with dexamethasone         Location: [] Take home patch   [] In clinic    []  Ice     []  heat  []  Ice massage  []  Laser   []  Anodyne Position:  Location:    []  Laser with stim  []  Other:  Position:  Location:   15 [x]  Vasopneumatic Device Pressure:       [] lo [] med [] hi   Temperature: [] lo [] med [] hi   [] Skin assessment post-treatment:  []intact []redness- no adverse reaction    []redness - adverse reaction:          44 min Therapeutic Exercise:  [x] See flow sheet :   Rationale: increase ROM and increase strength to improve the patients ability to to perform ADLs. 15 min Manual Therapy: GH PROM with osciallations, GH inferior/posterior mobs   Rationale: decrease pain, increase ROM, increase tissue extensibility and decrease trigger points to improve ease of dressing. With   [] TE   [] TA   [] neuro   [] other: Patient Education: [x] Review HEP    [] Progressed/Changed HEP based on:   [] positioning   [] body mechanics   [] transfers   [] heat/ice application    [] other:      Other Objective/Functional Measures:    tight posterior capsule, limited IR  challenged with s/l ER and abduction   Supine AROM flexion with 2# wand to 125 degreees    Pain Level (0-10 scale) post treatment: 1/10 right shoulder    ASSESSMENT/Changes in Function:  Patient is slowly progressing towards goals. He continues to present with tight posterior capsule, but reports he has been performing towel IR stretch more consistently at home. Pt challenged with strengthening in sidelying( ER and abduction).        Patient will continue to benefit from skilled PT services to modify and progress therapeutic interventions, address functional mobility deficits, address ROM deficits, address strength deficits, analyze and address soft tissue restrictions, analyze and cue movement patterns and instruct in home and community integration to attain remaining goals. [x]  See Plan of Care  []  See progress note/recertification  []  See Discharge Summary         Progress towards goals / Updated goals:  1. Patient will increase PROM of right shoulder in all planes by 30 degrees to restore mobility for ease dressing   Current: able to better dress himself, still has difficulty donning compression sock. Better able to shave, comb hair, and donning shirts 5/31/19   2. Patient will increase right shoulder strength to 4/5 to improve ease of daily tasks. 3. Patient will increase left hip strength to 4+/5 to improve kinetic chain alignment and reduce knee pain with ambulation. 4. Patient will increase functional IR ROM to level of L4 to improve ease of dressing.   Current: Pt able to bring right hand to right PSIS. 5/24/19  5. Patient will increase FOTO score by 22 pts, 66/100, to show improved functional mobility and QOL.            PLAN  []  Upgrade activities as tolerated     [x]  Continue plan of care  []  Update interventions per flow sheet       []  Discharge due to:_  []  Other:_      Rivka Araujo, PT, LPTA 6/3/2019  12:20 PM    Future Appointments   Date Time Provider Dex Hatfield   6/3/2019  2:30 PM Kole Lagos OTR/L [facility]   6/5/2019 11:00 AM Cameron Topete PTA [facility]   6/7/2019 10:00 AM Richerd Goldmann, MD [facility] 69   6/7/2019  1:00 PM Cameron Topete PTA [facility]   6/18/2019 11:15 AM MD [facility] ROX[facility]NE SCHED   6/26/2019 11:00 AM [facility] [address]   7/22/2019  8:30 AM Jodee Fonseca  [address]   8/29/2019  4:00 PM MD [facility] ROX[facility]NE SCHED   10/2/2019  1:00 PM Amor, Kevin Reis MD [address]

## 2019-06-04 ENCOUNTER — TELEPHONE (OUTPATIENT)
Dept: ORTHOPEDIC SURGERY | Facility: CLINIC | Age: 64
End: 2019-06-04

## 2019-06-04 ENCOUNTER — OFFICE VISIT (OUTPATIENT)
Dept: VASCULAR SURGERY | Age: 64
End: 2019-06-04

## 2019-06-04 VITALS
HEIGHT: 70 IN | SYSTOLIC BLOOD PRESSURE: 126 MMHG | HEART RATE: 75 BPM | WEIGHT: 230 LBS | OXYGEN SATURATION: 98 % | DIASTOLIC BLOOD PRESSURE: 70 MMHG | RESPIRATION RATE: 18 BRPM | BODY MASS INDEX: 32.93 KG/M2

## 2019-06-04 DIAGNOSIS — G57.93 NEUROPATHY OF BOTH FEET: ICD-10-CM

## 2019-06-04 DIAGNOSIS — Z86.718 HISTORY OF DVT OF LOWER EXTREMITY: Primary | ICD-10-CM

## 2019-06-04 RX ORDER — GABAPENTIN 400 MG/1
400 CAPSULE ORAL
Qty: 30 CAP | Refills: 0 | Status: SHIPPED | OUTPATIENT
Start: 2019-06-04 | End: 2019-06-18 | Stop reason: ALTCHOICE

## 2019-06-04 NOTE — PROGRESS NOTES
[address]    Chief Complaint   Patient presents with    Swelling       History and Physical    61-year-old male here today for evaluation of bilateral lower extremity edema. He has a history of hypercoagulable disorder. This affects him his brother and his mother. He is on lifelong warfarin for this. He is now had prostate surgery, left total knee replacement, and right shoulder surgery. He does not have any infections or complications he tells me. He was used Lovenox and warfarin throughout these procedures. He had a recent DVT study that was negative. He also complains of pain and a funny feeling on the bottom of his feet especially at night.   He is a nondiabetic but he does have a history of back injury    Past Medical History:   Diagnosis Date    Arthritis     Bleeding     Chronic pain     knee and shoulder    GERD (gastroesophageal reflux disease)     Headache(784.0)     migraine    High cholesterol     Hypertension     Lower back pain 11/6/2010    Other chest pain     Pure hypercholesterolemia     Right buttock pain 11/6/2010    Right foot pain     Rotator cuff tear     left-since 2010, worsened tear Jan.    Rotator cuff tear, right     Spinal stenosis     Tendonitis, tibialis     anterior    Thromboembolus ([facility] [address]     3 after sx last one 2000     Patient Active Problem List   Diagnosis Code    Esophageal reflux K21.9    Pure hypercholesterolemia E78.00    Personal history of venous thrombosis and embolism Z86.718    Facet arthropathy, lumbar M47.816    DDD (degenerative disc disease), lumbar M51.36    Essential hypertension I10    Bronchitis J40    Chronic tension-type headache, not intractable G44.229    Lumbar spinal stenosis M48.061    Abdominal bloating R14.0    Myofascial pain M79.18    Cervical facet joint syndrome M47.812    Muscle spasm M62.838    Warfarin anticoagulation Z79.01    Muscle spasm of back M62.830    Primary osteoarthritis of left knee M17.12  Arthritis of knee M17.10    Prostate cancer ([facility] [address] C61    Prostate CA ([facility] [address] C61    Malignant neoplasm of prostate ([facility] [address] C61     Past Surgical History:   Procedure Laterality Date    FOOT/TOES SURGERY PROC UNLISTED      HX BACK SURGERY      HX HEENT Right 09/2018    eye surgery, macular     HX KNEE REPLACEMENT Left     HX ORTHOPAEDIC  06-25-12    Right foot with excision of bursa and adipose tissue from fifth metatarsal base by Dr. Ashish Caputo      lower back (1992 and 2000)    HX OTHER SURGICAL      left foot (2008)    HX OTHER SURGICAL      Retina repair     HX PROSTATECTOMY  11/2018    HX ROTATOR CUFF REPAIR Right 01/28/2019    by Dr. Carlean Krabbe       Current Outpatient Medications   Medication Sig Dispense Refill    gabapentin (NEURONTIN) 400 mg capsule Take 1 Cap by mouth nightly. 30 Cap 0    warfarin (COUMADIN) 5 mg tablet TAKE 2 AND 1/2 TABLETS BY MOUTH DAILY OR AS DIRECTED 75 Tab 0    montelukast (SINGULAIR) 10 mg tablet TAKE 1 TABLET BY MOUTH EVERY DAY 90 Tab 0    butalbital-acetaminophen-caff (FIORICET) -40 mg per capsule Take 1 Cap by mouth every eight (8) hours as needed for Pain. 90 Cap 1    diclofenac (VOLTAREN) 1 % gel Apply 4 g to affected area four (4) times daily. Maximum 16 grams per joint per day. Dispense 5 100 gram tubes 5 Each 0    lisinopril-hydroCHLOROthiazide (PRINZIDE, ZESTORETIC) 20-12.5 mg per tablet TAKE 1 TABLET BY MOUTH DAILY 90 Tab 0    labetalol (NORMODYNE) 100 mg tablet TAKE ONE TABLET BY MOUTH TWICE DAILY 180 Tab 0    metaxalone (SKELAXIN) 800 mg tablet Take 1 Tab by mouth three (3) times daily.  Indications: muscle spasm 90 Tab 2    raNITIdine (ZANTAC) 150 mg tablet TK 1 T PO HS  1    ALPRAZolam (XANAX) 0.5 mg tablet Take one half(1/2) tab to one(1) tab by mouth at bedtime as needed for sleep 30 Tab 0    lansoprazole (PREVACID) 30 mg capsule TAKE 1 CAPSULE DAILY BEFOREBREAKFAST 90 Cap 3    warfarin (COUMADIN) 5 mg tablet TAKE 2 AND 1/2 TABLETS BY MOUTH DAILY OR AS DIRECTED 75 Tab 0    warfarin (COUMADIN) 3 mg tablet Or as directed 30 Tab 3    montelukast (SINGULAIR) 10 mg tablet TAKE 1 TABLET BY MOUTH EVERY DAY (Patient taking differently: TAKE 1 TABLET BY MOUTH EVERY HS) 90 Tab 0    acetaminophen (TYLENOL) 500 mg tablet Take 1,000 mg by mouth every six (6) hours as needed for Pain.  celecoxib (CELEBREX) 200 mg capsule Take 1 Cap by mouth two (2) times a day for 90 days. 60 Cap 2    triamcinolone acetonide (KENALOG) 0.1 % topical cream Apply twice per day 15 g 0    celecoxib (CELEBREX) 50 mg capsule Take 50 mg by mouth two (2) times a day. Allergies   Allergen Reactions    Amoxicillin Itching    Augmentin [Amoxicillin-Pot Clavulanate] Itching    Chlorhexidine Towelette Itching    Hibiclens [Chlorhexidine Gluconate] Itching    Milk Containing Products Diarrhea    Penicillins Rash    Requip [Ropinirole] Nausea and Vomiting       Review of Systems    A full review of systems was completed times ten organ systems and was deemed negative unless otherwise mentioned in the HPI. Physical   Visit Vitals  /70 (BP 1 Location: Right arm, BP Patient Position: Sitting)   Pulse 75   Resp 18   Ht 5' 10\" (1.778 m)   Wt 230 lb (104.3 kg)   SpO2 98%   BMI 33.00 kg/m²       Healthy-appearing 64 good spirits  Head is normocephalic  Neck no JVD no bruit  Chest is clear  Cardiac is regular  Abdomen obese soft nontender  Lower extremities certainly swelling on his right lower extremity more so than the left  Pulses are intact  Skin intact without ulceration  Range of motion strength seem equal      Impression/Plan:     ICD-10-CM ICD-9-CM    1. History of DVT of lower extremity Z86.718 V12.51 DUPLEX LOWER EXT VENOUS BILAT   2. Neuropathy of both feet G57.93 356.9      Bilateral leg swelling with no new DVT, continue warfarin I think this is a great choice for his hypercoagulable disorder.   I will send him for an ultrasound to evaluate his status of reflux but discussed with him at length would that stockings and elevation may be his best therapy for the swelling. I gave him a new prescription today for therapeutic stockings. Also prescription for Neurontin as I think he has neuropathy of his feet that affects him at night. This is likely from his previous lumbar injury. We will see him back after the study  Orders Placed This Encounter    gabapentin (NEURONTIN) 400 mg capsule           Tamera Alves MD    PLEASE NOTE:  This document has been produced using voice recognition software. Unrecognized errors in transcription may be present.

## 2019-06-04 NOTE — TELEPHONE ENCOUNTER
Patient states he is having a lot of pain and stiffness in the L knee. It is mostly when getting up from a sitting position. Patient is requesting an appointment  Dr. Rodney Mccarty next available is not until the week of 7/8/19 and Figueroa Alberto the week of 7/15/19.     Please advise    His# [phone]

## 2019-06-04 NOTE — PROGRESS NOTES
1. Have you been to the ER, urgent care clinic since your last visit? Hospitalized since your last visit? No    2. Have you seen or consulted any other health care providers outside of the [address] [place] [address] since your last visit? Include any pap smears or colon screening.  Yes When: 09/2018, eye surgery ; 11/2018 , prostate surgery; 01/2019 , rotator cuff surgery        3 most recent PHQ Screens 6/4/2019   PHQ Not Done -   Little interest or pleasure in doing things Not at all   Feeling down, depressed, irritable, or hopeless Not at all   Total Score PHQ 2 0

## 2019-06-05 ENCOUNTER — HOSPITAL ENCOUNTER (OUTPATIENT)
Dept: PHYSICAL THERAPY | Age: 64
Discharge: HOME | End: 2019-06-05
Payer: MEDICARE

## 2019-06-05 PROCEDURE — 97140 MANUAL THERAPY 1/> REGIONS: CPT

## 2019-06-05 PROCEDURE — 97110 THERAPEUTIC EXERCISES: CPT

## 2019-06-05 PROCEDURE — 97016 VASOPNEUMATIC DEVICE THERAPY: CPT

## 2019-06-05 NOTE — TELEPHONE ENCOUNTER
Patient to call back if he wants to see either Blade Gibbs or CARA Ocasio CBRITE. At that time appt can be made with either.

## 2019-06-05 NOTE — PROGRESS NOTES
PT DAILY TREATMENT NOTE 10-18    Patient Name: Dexter Kessler  Date:2019  : 1955  [x]  Patient  Verified  Payor: [place] MEDICARE / Plan: [place] MEDICARE PART A & B / Product Type: Medicare /    In time:1058  Out time:1144  Total Treatment Time (min): 55  Visit #: 18 of     Medicare/BCBS Only   Total Timed Codes (min):  31 1:1 Treatment Time:  31       Treatment Area: Pain in left knee [M25.562]  Pain in right shoulder [M25.511]    SUBJECTIVE  Pain Level (0-10 scale): 2/10  Any medication changes, allergies to medications, adverse drug reactions, diagnosis change, or new procedure performed?: [x] No    [] Yes (see summary sheet for update)  Subjective functional status/changes:   [] No changes reported  Pt stated that he cont to have pain in his left knee, but his sh is getting better    OBJECTIVE    Modality rationale: decrease inflammation and decrease pain to improve the patients ability to increase ease with ADLs   Min Type Additional Details    [] Estim:  []Unatt       []IFC  []Premod                        []Other:  []w/ice   []w/heat  Position:  Location:    [] Estim: []Att    []TENS instruct  []NMES                    []Other:  []w/US   []w/ice   []w/heat  Position:  Location:    []  Traction: [] Cervical       []Lumbar                       [] Prone          []Supine                       []Intermittent   []Continuous Lbs:  [] before manual  [] after manual    []  Ultrasound: []Continuous   [] Pulsed                           []1MHz   []3MHz W/cm2:  Location:    []  Iontophoresis with dexamethasone         Location: [] Take home patch   [] In clinic    []  Ice     []  heat  []  Ice massage  []  Laser   []  Anodyne Position:  Location:    []  Laser with stim  []  Other:  Position:  Location:   15 [x]  Vasopneumatic Device Pressure:       [x] lo [] med [] hi   Temperature: [x] lo [] med [] hi   [x] Skin assessment post-treatment:  [x]intact []redness- no adverse reaction    []redness - adverse reaction:     23 min Therapeutic Exercise:  [x] See flow sheet :   Rationale: increase ROM and increase strength to improve the patients ability to increase ease with ADLs    8 min Manual Therapy:  PROM and GH mobs to right sh   Rationale: decrease pain, increase ROM and increase tissue extensibility to increase ease with ADLs    With   [x] TE   [] TA   [] neuro   [] other: Patient Education: [x] Review HEP    [] Progressed/Changed HEP based on:   [] positioning   [] body mechanics   [] transfers   [] heat/ice application    [] other:      Other Objective/Functional Measures:   Had no difficulty with exercises  Had increased PROM for all motions, but cont to be restricted for flex  No complaint of increased pain during session     Pain Level (0-10 scale) post treatment: 2/10    ASSESSMENT/Changes in Function:   Pt is progressing well toward sh goals, but cont with pain in the left knee. Cont to have difficulty with dressing, but is able to brush hair and shave. Patient will continue to benefit from skilled PT services to modify and progress therapeutic interventions, address functional mobility deficits, address ROM deficits and address strength deficits to attain remaining goals. []  See Plan of Care  [x]  See progress note/recertification  []  See Discharge Summary         Progress towards goals / Updated goals:  1. Patient will increase PROM of right shoulder in all planes by 30 degrees to restore mobility for ease dressing   Current: able to better dress himself, still has difficulty donning compression sock. Better able to shave, comb hair, and donning shirts 5/31/19   2. Patient will increase right shoulder strength to 4/5 to improve ease of daily tasks. Progressing. 6/5/19  3. Patient will increase left hip strength to 4+/5 to improve kinetic chain alignment and reduce knee pain with ambulation.    4. Patient will increase functional IR ROM to level of L4 to improve ease of dressing.   Current: Pt able to bring right hand to right PSIS. 5/24/19  5. Patient will increase FOTO score by 22 pts, 66/100, to show improved functional mobility and QOL.     PLAN  []  Upgrade activities as tolerated     [x]  Continue plan of care  []  Update interventions per flow sheet       []  Discharge due to:_  []  Other:_      Justine Ayala PTA 6/5/2019  10:58 AM    Future Appointments   Date Time Provider Dex [place]   6/5/2019 11:00 AM Nakita Reid PTA MMCPTPB [facility]   6/7/2019 10:00 AM Renata Leong MD [facility]   6/7/2019  1:00 PM Nakita Reid PTA [facility]CPTP[facility]   6/10/2019 11:30 AM Clare Nolen PT [id number] [facility]   6/10/2019  1:45 PM Ramirez Hancock, OTR/L MMCPTPB [facility]   6/12/2019  2:30 PM Pietro Taylor WVNVSIS [facility]   6/12/2019  3:15 PM Sridevi Hancock [id number] [facility]   6/17/2019  2:00 PM Nakita Reid PTA [facility]CP[facility]   6/17/2019  2:30 PM Sridevi Hancock MDJXMON [facility]   6/18/2019 11:15 AM Patel Sauer MD [facility] SCHED   6/19/2019 10:30 AM La Salazar L MMCPTPB [facility]   6/19/2019 11:00 AM Sridevi Hancock AZGSIMB [facility]   6/19/2019  2:45 PM BSVVS IMAGING 1 2VV [facility] SCHED   6/24/2019  1:30 PM Pietro Taylor BMZMERT [facility]   6/24/2019  2:30 PM Sridevi Hancock VSNOCCX [facility]   6/26/2019 11:00 AM [facility] NURSE [facility] ROX[facility] SCHED   6/27/2019  3:00 PM Clare Nolen, PT MMCPTPB [facility]   6/27/2019  4:15 PM Ramirez Hancock, OTR/L [facility]CPTPB [facility]   7/1/2019  1:00 PM Nakita Reid, PTA [facility]CPTP[facility]   7/1/2019  1:45 PM Ramirez Hancock, OTR/L [facility]CPTPB [facility]   7/3/2019 12:30 PM Clare Nolen, PT [facility]CPTPB [facility]   7/3/2019  1:00 PM Ramirez Hancock, OTR/L MMCPTPB [facility]   7/5/2019  9:30 AM Bill Reid MD 2VV [facility] SCHED   7/22/2019  8:30 AM Donnell Shearer  [address]   8/29/2019  4:00 PM Patel Sauer MD [facility] SCHED   10/2/2019  1:00 PM Alli Chinchilla MD [address] [address]

## 2019-06-06 ENCOUNTER — OFFICE VISIT (OUTPATIENT)
Dept: ORTHOPEDIC SURGERY | Age: 64
End: 2019-06-06

## 2019-06-06 ENCOUNTER — HOSPITAL ENCOUNTER (OUTPATIENT)
Dept: LAB | Age: 64
Discharge: HOME | End: 2019-06-06
Payer: MEDICARE

## 2019-06-06 VITALS
SYSTOLIC BLOOD PRESSURE: 136 MMHG | HEART RATE: 78 BPM | HEIGHT: 70 IN | WEIGHT: 230 LBS | OXYGEN SATURATION: 97 % | BODY MASS INDEX: 32.93 KG/M2 | RESPIRATION RATE: 19 BRPM | TEMPERATURE: 97.8 F | DIASTOLIC BLOOD PRESSURE: 73 MMHG

## 2019-06-06 DIAGNOSIS — Z96.652 STATUS POST TOTAL LEFT KNEE REPLACEMENT: Primary | ICD-10-CM

## 2019-06-06 DIAGNOSIS — S89.92XA INJURY OF LEFT KNEE, INITIAL ENCOUNTER: ICD-10-CM

## 2019-06-06 DIAGNOSIS — M76.32 ILIOTIBIAL BAND TENDINITIS OF LEFT SIDE: ICD-10-CM

## 2019-06-06 DIAGNOSIS — Z96.652 STATUS POST TOTAL LEFT KNEE REPLACEMENT: ICD-10-CM

## 2019-06-06 LAB
ERYTHROCYTE [DISTWIDTH] IN BLOOD BY AUTOMATED COUNT: 13.8 % (ref 11.6–14.5)
ERYTHROCYTE [SEDIMENTATION RATE] IN BLOOD: 18 MM/HR (ref 0–20)
HCT VFR BLD AUTO: 38.7 % (ref 36–48)
HGB BLD-MCNC: 12.6 G/DL (ref 13–16)
MCH RBC QN AUTO: 30.4 PG (ref 24–34)
MCHC RBC AUTO-ENTMCNC: 32.6 G/DL (ref 31–37)
MCV RBC AUTO: 93.3 FL (ref 74–97)
PLATELET # BLD AUTO: 162 K/UL (ref 135–420)
PMV BLD AUTO: 11.1 FL (ref 9.2–11.8)
RBC # BLD AUTO: 4.15 M/UL (ref 4.7–5.5)
WBC # BLD AUTO: 5 K/UL (ref 4.6–13.2)

## 2019-06-06 PROCEDURE — 36415 COLL VENOUS BLD VENIPUNCTURE: CPT

## 2019-06-06 PROCEDURE — 85027 COMPLETE CBC AUTOMATED: CPT

## 2019-06-06 PROCEDURE — 86141 C-REACTIVE PROTEIN HS: CPT

## 2019-06-06 PROCEDURE — 83520 IMMUNOASSAY QUANT NOS NONAB: CPT

## 2019-06-06 PROCEDURE — 85652 RBC SED RATE AUTOMATED: CPT

## 2019-06-06 RX ORDER — DICLOFENAC SODIUM 10 MG/G
2 GEL TOPICAL 4 TIMES DAILY
Qty: 100 G | Refills: 2 | Status: SHIPPED | OUTPATIENT
Start: 2019-06-06 | End: 2019-08-05 | Stop reason: SDUPTHER

## 2019-06-06 NOTE — PROGRESS NOTES
Johnna Bass  1955   Chief Complaint   Patient presents with    Knee Pain     left        HISTORY OF PRESENT ILLNESS  Johnna Bass is a 75 y.o. male who presents today for reevaluation of left knee. He had his left knee replaced 3/27/18 and comes in for follow up. He reports falling 4 weeks ago and has had some stiffness/pain over the last 2 weeks. He noticed the pain also started when he stopped taking his celebrex. He has pain when he gets up from a seated position and when he starts walking. The pain gets better as he gets moving. The pain is achy and is located on the lateral aspect of his knee. He comes in for refills of his volteran gel and to get xrays because he wanted to make sure his knee replacement was ok. Patient denies any fever, chills, chest pain, shortness of breath or calf pain. There are no new illness or injuries to report since last seen in the office. No changes in medications, allergies, social or family history. PHYSICAL EXAM:   Visit Vitals  /73 (BP 1 Location: Right arm, BP Patient Position: Sitting)   Pulse 78   Temp 97.8 °F (36.6 °C) (Oral)   Resp 19   Ht 5' 10\" (1.778 m)   Wt 230 lb (104.3 kg)   SpO2 97% Comment: RA   BMI 33.00 kg/m²     The patient is a well-developed, well-nourished male   in no acute distress. The patient is alert and oriented times three. Mood and affect are normal.  LYMPHATIC: lymph nodes are not enlarged and are within normal limits  SKIN: normal in color and non tender to palpation. There are no bruises or abrasions noted. NEUROLOGICAL: Motor sensory exam is within normal limits. Reflexes are equal bilaterally. There is normal sensation to pinprick and light touch  ORTHOPEDIC EXAM of Left knee:   Inspection: Effusion not present,  incision well healed  TTP: none   Range of motion: 0-120 flexion   Stability: Stable  Strength: 5/5  2+ distal pulses           IMAGING: 3 views of the left knee show total knee components in good placement. No evidence for loosening or fracture. IMPRESSION:      ICD-10-CM ICD-9-CM    1. Status post total left knee replacement Z96.652 V43.65         PLAN:   Pt having left knee discomfort. I think this is IT band tightness. xrays today look good. I do not see any concerns there. I have recommended volteran gel and PT to help. I have ordered both in the office today. His ROM looks good today. I do not appreciate any stiffness of the knee. I do not think his knee is infected but because he is having pain I will order blood work to look for infection.   RTC I will call him with the results and he will keep his regular follow up with CARA Fulton or ESPERANZA Ell[facility] and [facility]

## 2019-06-06 NOTE — PROGRESS NOTES
1. Have you been to the ER, urgent care clinic since your last visit? Hospitalized since your last visit? Yes When: 04/2019 Where: [address] [place] [address] Reason for visit: Fall     2. Have you seen or consulted any other health care providers outside of the [address] [place] [address] since your last visit? Include any pap smears or colon screening.  No

## 2019-06-07 ENCOUNTER — HOSPITAL ENCOUNTER (OUTPATIENT)
Dept: PHYSICAL THERAPY | Age: 64
Discharge: HOME | End: 2019-06-07
Payer: MEDICARE

## 2019-06-07 ENCOUNTER — OFFICE VISIT (OUTPATIENT)
Dept: ORTHOPEDIC SURGERY | Age: 64
End: 2019-06-07

## 2019-06-07 VITALS
WEIGHT: 230 LBS | TEMPERATURE: 96.3 F | HEART RATE: 72 BPM | BODY MASS INDEX: 32.93 KG/M2 | RESPIRATION RATE: 13 BRPM | DIASTOLIC BLOOD PRESSURE: 72 MMHG | HEIGHT: 70 IN | SYSTOLIC BLOOD PRESSURE: 138 MMHG | OXYGEN SATURATION: 96 %

## 2019-06-07 DIAGNOSIS — S46.011D TRAUMATIC COMPLETE TEAR OF RIGHT ROTATOR CUFF, SUBSEQUENT ENCOUNTER: Primary | ICD-10-CM

## 2019-06-07 LAB — CRP SERPL HS-MCNC: >9.5 MG/L

## 2019-06-07 PROCEDURE — 97140 MANUAL THERAPY 1/> REGIONS: CPT

## 2019-06-07 PROCEDURE — 97016 VASOPNEUMATIC DEVICE THERAPY: CPT

## 2019-06-07 PROCEDURE — 97110 THERAPEUTIC EXERCISES: CPT

## 2019-06-07 NOTE — PROGRESS NOTES
Patient: Crow Mcclellan                MRN: [id number]       SSN: [id number]  YOB: 1955        AGE: 75 y.o. SEX: male  Body mass index is 33 kg/m². PCP: Bambi Pak MD  06/07/19    Chief Complaint: Right shoulder follow up     HISTORY OF PRESENT ILLNESS:  Elisabet Cheng returns to the office today for his right shoulder. Overall, he is coming along nicely with therapy. It has been almost five months since his surgery. He continues to be in therapy. In therapy, he is also getting work on his left knee, as well as some right hand weakness and left hand weakness and pain. I think he has some arthritis in his hands. Past Medical History:   Diagnosis Date    Arthritis     Bleeding     Chronic pain     knee and shoulder    GERD (gastroesophageal reflux disease)     Headache(784.0)     migraine    High cholesterol     Hypertension     Lower back pain 11/6/2010    Other chest pain     Pure hypercholesterolemia     Right buttock pain 11/6/2010    Right foot pain     Rotator cuff tear     left-since 2010, worsened tear Jan.    Rotator cuff tear, right     Spinal stenosis     Tendonitis, tibialis     anterior    Thromboembolus ([address]     3 after sx last one 2000       Family History   Problem Relation Age of Onset   [facility] Arthritis-rheumatoid Mother     Dementia Mother     Heart Disease Father     Cancer Father     Hypertension Other     Cancer Brother        Current Outpatient Medications   Medication Sig Dispense Refill    diclofenac (VOLTAREN) 1 % gel Apply 2 g to affected area four (4) times daily. 100 g 2    warfarin (COUMADIN) 5 mg tablet TAKE 2 AND 1/2 TABLETS BY MOUTH DAILY OR AS DIRECTED 75 Tab 0    celecoxib (CELEBREX) 200 mg capsule Take 1 Cap by mouth two (2) times a day for 90 days.  60 Cap 2    triamcinolone acetonide (KENALOG) 0.1 % topical cream Apply twice per day 15 g 0    montelukast (SINGULAIR) 10 mg tablet TAKE 1 TABLET BY MOUTH EVERY DAY 90 Tab 0    butalbital-acetaminophen-caff (FIORICET) -40 mg per capsule Take 1 Cap by mouth every eight (8) hours as needed for Pain. 90 Cap 1    diclofenac (VOLTAREN) 1 % gel Apply 4 g to affected area four (4) times daily. Maximum 16 grams per joint per day. Dispense 5 100 gram tubes 5 Each 0    lisinopril-hydroCHLOROthiazide (PRINZIDE, ZESTORETIC) 20-12.5 mg per tablet TAKE 1 TABLET BY MOUTH DAILY 90 Tab 0    celecoxib (CELEBREX) 50 mg capsule Take 50 mg by mouth two (2) times a day.  labetalol (NORMODYNE) 100 mg tablet TAKE ONE TABLET BY MOUTH TWICE DAILY 180 Tab 0    metaxalone (SKELAXIN) 800 mg tablet Take 1 Tab by mouth three (3) times daily. Indications: muscle spasm 90 Tab 2    raNITIdine (ZANTAC) 150 mg tablet TK 1 T PO HS  1    ALPRAZolam (XANAX) 0.5 mg tablet Take one half(1/2) tab to one(1) tab by mouth at bedtime as needed for sleep 30 Tab 0    lansoprazole (PREVACID) 30 mg capsule TAKE 1 CAPSULE DAILY BEFOREBREAKFAST 90 Cap 3    warfarin (COUMADIN) 5 mg tablet TAKE 2 AND 1/2 TABLETS BY MOUTH DAILY OR AS DIRECTED 75 Tab 0    warfarin (COUMADIN) 3 mg tablet Or as directed 30 Tab 3    montelukast (SINGULAIR) 10 mg tablet TAKE 1 TABLET BY MOUTH EVERY DAY (Patient taking differently: TAKE 1 TABLET BY MOUTH EVERY HS) 90 Tab 0    acetaminophen (TYLENOL) 500 mg tablet Take 1,000 mg by mouth every six (6) hours as needed for Pain.  gabapentin (NEURONTIN) 400 mg capsule Take 1 Cap by mouth nightly.  30 Cap 0       Allergies   Allergen Reactions    Amoxicillin Itching    Augmentin [Amoxicillin-Pot Clavulanate] Itching    Chlorhexidine Towelette Itching    Hibiclens [Chlorhexidine Gluconate] Itching    Milk Containing Products Diarrhea    Penicillins Rash    Requip [Ropinirole] Nausea and Vomiting       Past Surgical History:   Procedure Laterality Date    FOOT/TOES SURGERY PROC UNLISTED      HX BACK SURGERY      HX HEENT Right 09/2018    eye surgery, macular     HX KNEE REPLACEMENT Left     HX ORTHOPAEDIC  06-25-12    Right foot with excision of bursa and adipose tissue from fifth metatarsal base by Dr. Opal Weaver      lower back (1992 and 2000)    HX OTHER SURGICAL      left foot (2008)    HX OTHER SURGICAL      Retina repair     HX PROSTATECTOMY  11/2018    HX ROTATOR CUFF REPAIR Right 01/28/2019    by Dr. Orlando Nine History     Socioeconomic History    Marital status:      Spouse name: Not on file    Number of children: Not on file    Years of education: Not on file    Highest education level: Not on file   Occupational History    Not on file   Social Needs    Financial resource strain: Not on file    Food insecurity:     Worry: Not on file     Inability: Not on file    Transportation needs:     Medical: Not on file     Non-medical: Not on file   Tobacco Use    Smoking status: Never Smoker    Smokeless tobacco: Never Used   Substance and Sexual Activity    Alcohol use: No    Drug use: No    Sexual activity: Not Currently   Lifestyle    Physical activity:     Days per week: Not on file     Minutes per session: Not on file    Stress: Not on file   Relationships    Social connections:     Talks on phone: Not on file     Gets together: Not on file     Attends Yarsani service: Not on file     Active member of club or organization: Not on file     Attends meetings of clubs or organizations: Not on file     Relationship status: Not on file    Intimate partner violence:     Fear of current or ex partner: Not on file     Emotionally abused: Not on file     Physically abused: Not on file     Forced sexual activity: Not on file   Other Topics Concern     Service Not Asked    Blood Transfusions Not Asked    Caffeine Concern Not Asked    Occupational Exposure Not Asked   Clemetine Budge Hazards Not Asked    Sleep Concern Not Asked    Stress Concern Not Asked    Weight Concern Not Asked    Special Diet Not Asked    Back Care Not Asked    Exercise Not Asked    Bike Helmet Not Asked   [address] [facility] Not Asked    Self-Exams Not Asked   Social History Narrative    Not on file       REVIEW OF SYSTEMS:      No changes from previous review of systems unless noted. PHYSICAL EXAMINATION:  Visit Vitals  /72   Pulse 72   Temp 96.3 °F (35.7 °C) (Oral)   Resp 13   Ht 5' 10\" (1.778 m)   Wt 230 lb (104.3 kg)   SpO2 96%   BMI 33.00 kg/m²     Body mass index is 33 kg/m². GENERAL: Alert and oriented x3, in no acute distress. HEENT: Normocephalic, atraumatic. RESP: Non labored breathing. SKIN: No rashes or lesions noted. PHYSICAL EXAM:  Physical exam of the right shoulder with improving range of motion and strength. Infraspinatus and belly press testing do not reveal any weakness or pain. He still has some weakness with supraspinatus testing. Forward flexion is to about 140 degrees today actively. ASSESSMENT AND PLAN:   Christin Soto continues to recover from his massive rotator cuff repair. We will continue with therapy and I will see him back in six weeks.               Electronically signed by: Jesus Braga MD

## 2019-06-07 NOTE — PROGRESS NOTES
PT DAILY TREATMENT NOTE 10-18    Patient Name: Antonia Frye  Date:2019  : 1955  [x]  Patient  Verified  Payor: [place] MEDICARE / Plan: [place] MEDICARE PART A & B / Product Type: Medicare /    In time:100  Out time:148  Total Treatment Time (min): 48  Visit #: 19 of     Medicare/BCBS Only   Total Timed Codes (min):  33 1:1 Treatment Time:  33       Treatment Area: Pain in left knee [M25.562]  Pain in right shoulder [M25.511]    SUBJECTIVE  Pain Level (0-10 scale): 1/10 sh, 3/10 knee  Any medication changes, allergies to medications, adverse drug reactions, diagnosis change, or new procedure performed?: [x] No    [] Yes (see summary sheet for update)  Subjective functional status/changes:   [] No changes reported  Pt stated that he got a script for his knee    OBJECTIVE    Modality rationale: decrease inflammation to improve the patients ability to increase ease with ADLs   Min Type Additional Details    [] Estim:  []Unatt       []IFC  []Premod                        []Other:  []w/ice   []w/heat  Position:  Location:    [] Estim: []Att    []TENS instruct  []NMES                    []Other:  []w/US   []w/ice   []w/heat  Position:  Location:    []  Traction: [] Cervical       []Lumbar                       [] Prone          []Supine                       []Intermittent   []Continuous Lbs:  [] before manual  [] after manual    []  Ultrasound: []Continuous   [] Pulsed                           []1MHz   []3MHz W/cm2:  Location:    []  Iontophoresis with dexamethasone         Location: [] Take home patch   [] In clinic    []  Ice     []  heat  []  Ice massage  []  Laser   []  Anodyne Position:  Location:    []  Laser with stim  []  Other:  Position:  Location:   15 [x]  Vasopneumatic Device Pressure:       [x] lo [] med [] hi   Temperature: [x] lo [] med [] hi   [x] Skin assessment post-treatment:  [x]intact []redness- no adverse reaction    []redness - adverse reaction:     25 min Therapeutic Exercise: [x] See flow sheet :   Rationale: increase ROM and increase strength to improve the patients ability to increase ease with ADLs    8 min Manual Therapy:  PROM with oscillations and GH mobs   Rationale: decrease pain, increase ROM and increase tissue extensibility to increase ease with ADLs    With   [x] TE   [] TA   [] neuro   [] other: Patient Education: [x] Review HEP    [] Progressed/Changed HEP based on:   [] positioning   [] body mechanics   [] transfers   [] heat/ice application    [] other:      Other Objective/Functional Measures:   Had no difficulty with exercises  Tolerated increased weights today without difficulty     Pain Level (0-10 scale) post treatment: 0/10 sh, 2/10 knee    ASSESSMENT/Changes in Function:   Pt is progressing slowly toward goals. Pt cont with very mild pain in the right sh and left knee. Pt reports improvement in ability to perform ADLs. Patient will continue to benefit from skilled PT services to modify and progress therapeutic interventions, address functional mobility deficits, address ROM deficits and address strength deficits to attain remaining goals. []  See Plan of Care  [x]  See progress note/recertification  []  See Discharge Summary         Progress towards goals / Updated goals:  1. Patient will increase PROM of right shoulder in all planes by 30 degrees to restore mobility for ease dressing   Current: able to better dress himself, still has difficulty donning compression sock. Better able to shave, comb hair, and donning shirts 5/31/19   2. Patient will increase right shoulder strength to 4/5 to improve ease of daily tasks. Progressing. 6/5/19  3. Patient will increase left hip strength to 4+/5 to improve kinetic chain alignment and reduce knee pain with ambulation.    4. Patient will increase functional IR ROM to level of L4 to improve ease of dressing.   Current: Pt able to bring right hand to right PSIS. 5/24/19  5. Patient will increase FOTO score by 22 pts, 66/100, to show improved functional mobility and QOL.     PLAN  []  Upgrade activities as tolerated     [x]  Continue plan of care  []  Update interventions per flow sheet       []  Discharge due to:_  []  Other:_      Bhumi García PTA 6/7/2019  1:01 PM    Future Appointments   Date Time Provider Dex Alysia   6/11/2019  1:30 PM Lakeisha Conley [id number] [facility]   6/11/2019  2:00 PM Gabino Re UGJPROD [facility]   6/12/2019  2:30 PM Madison Nievess [id number] [facility]   6/12/2019  3:15 PM Gabino Re JHVNXTI [facility]   6/17/2019  2:00 PM Shahriar , PTA MMCPTPB [facility]   6/17/2019  2:30 PM Gabino Re [id number] [facility]   6/18/2019 11:15 AM Norma Vidales MD [facility] ROX[facility]NE SCHED   6/19/2019  2:45 PM BSVVS IMAGING 1 2VV [facility] SCHED   6/21/2019  3:00 PM Shahriar , PTA MMCPTPB [facility]   6/21/2019  3:30 PM Gabino Re [id number] [facility]   6/26/2019 11:00 AM [facility] NURSE [facility] ROX[facility]NE SCHED   7/1/2019  1:00 PM Shahriar , PTA MMCPTPB [facility]   7/1/2019  1:45 PM Maddison Man, OTR/L MMCPTPB [facility]   7/2/2019  9:20 AM Vitor Quigley MD [facility] ROXANNE SCHED   7/3/2019 12:30 PM Slim Moffett, PT MMCPTPB [facility]   7/3/2019  1:00 PM Maddison Man, OTR/L [facility]CP[facility]B [facility]   7/5/2019  9:30 AM Alexandru Diaz MD 2VV [facility] SCHED   7/19/2019 10:30 AM Cabrera Huynh PA-C [facility] SCHED   7/22/2019  8:30 AM Kimmy Dale  [address]   8/29/2019  4:00 PM Norma Vidales MD [facility] SCHED   10/2/2019  1:00 PM Given, Jem Velez MD [address]

## 2019-06-07 NOTE — PROGRESS NOTES
1. Have you been to the ER, urgent care clinic since your last visit? Hospitalized since your last visit? No    2. Have you seen or consulted any other health care providers outside of the [address] [place] [address] since your last visit? Include any pap smears or colon screening.  No

## 2019-06-10 ENCOUNTER — APPOINTMENT (OUTPATIENT)
Dept: PHYSICAL THERAPY | Age: 64
End: 2019-06-10
Payer: MEDICARE

## 2019-06-11 ENCOUNTER — HOSPITAL ENCOUNTER (OUTPATIENT)
Dept: PHYSICAL THERAPY | Age: 64
Discharge: HOME | End: 2019-06-11
Payer: MEDICARE

## 2019-06-11 LAB — IL6 SERPL-MCNC: 3.3 PG/ML (ref 0–15.5)

## 2019-06-11 PROCEDURE — 97110 THERAPEUTIC EXERCISES: CPT

## 2019-06-11 PROCEDURE — 97018 PARAFFIN BATH THERAPY: CPT

## 2019-06-11 NOTE — PROGRESS NOTES
PT DAILY TREATMENT NOTE 10-18    Patient Name: Trevor Joseph  Date:2019  : 1955  [x]  Patient  Verified  Payor: [place] MEDICARE / Plan: [place] MEDICARE PART A & B / Product Type: Medicare /    In time: 1:36  Out time: 2:02  Total Treatment Time (min): 26  Visit #: 20 of     Medicare/BCBS Only   Total Timed Codes (min):  26 1:1 Treatment Time: 26       Treatment Area: Pain in left knee [M25.562]  Pain in right shoulder [M25.511]    SUBJECTIVE  Pain Level (0-10 scale): 1/10 sh., 3/10 knee  Any medication changes, allergies to medications, adverse drug reactions, diagnosis change, or new procedure performed?: [x] No    [] Yes (see summary sheet for update)  Subjective functional status/changes:   [] No changes reported  I have an evaluation for my knee tomorrow. I have tendonitis. I also went to the vascular doctor and I don't have any blood clots but I have vascular insufficiency so I have swelling. I am happy with my shoulder, I still need to stretch it more but it's coming along more. He still has difficulty with stockings, button down shirts and end range reaching overhead.     OBJECTIVE    Modality rationale: decrease inflammation to improve the patients ability to increase ease with ADLs   Min Type Additional Details    [] Estim:  []Unatt       []IFC  []Premod                        []Other:  []w/ice   []w/heat  Position:  Location:    [] Estim: []Att    []TENS instruct  []NMES                    []Other:  []w/US   []w/ice   []w/heat  Position:  Location:    []  Traction: [] Cervical       []Lumbar                       [] Prone          []Supine                       []Intermittent   []Continuous Lbs:  [] before manual  [] after manual    []  Ultrasound: []Continuous   [] Pulsed                           []1MHz   []3MHz W/cm2:  Location:    []  Iontophoresis with dexamethasone         Location: [] Take home patch   [] In clinic    []  Ice     []  heat  []  Ice massage  []  Laser   []  Anodyne Position:  Location:    []  Laser with stim  []  Other:  Position:  Location:   TC [x]  Vasopneumatic Device Pressure:       [x] lo [] med [] hi   Temperature: [x] lo [] med [] hi   [x] Skin assessment post-treatment:  [x]intact []redness- no adverse reaction    []redness - adverse reaction:     26 min Therapeutic Exercise:  [x] See flow sheet :   Rationale: increase ROM and increase strength to improve the patients ability to increase ease with ADLs    TC min Manual Therapy:  PROM with oscillations and GH mobs   Rationale: decrease pain, increase ROM and increase tissue extensibility to increase ease with ADLs    With   [x] TE   [] TA   [] neuro   [] other: Patient Education: [x] Review HEP    [] Progressed/Changed HEP based on:   [] positioning   [] body mechanics   [] transfers   [] heat/ice application    [] other:      Other Objective/Functional Measures:   - Light progression as noted on flow sheet  - Ms fatigue with wall wipes 2#  - MT and VASO held due to TC, OT appt at 2pm     Pain Level (0-10 scale) post treatment: 0-1/10 sh., 2/10 knee    ASSESSMENT/Changes in Function:   Patient is progressing slowly towards his goals. He remains limited with end range flexion and IR behind the back. He tolerated treatment well and noted ms fatigue with use of 2# weight for wall wipes. Will continue to progress as able. Patient will continue to benefit from skilled PT services to modify and progress therapeutic interventions, address functional mobility deficits, address ROM deficits and address strength deficits to attain remaining goals. []  See Plan of Care  [x]  See progress note/recertification  []  See Discharge Summary         Progress towards goals / Updated goals:  1. Patient will increase PROM of right shoulder in all planes by 30 degrees to restore mobility for ease dressing   Current: able to better dress himself, still has difficulty donning compression sock.  Better able to shave, comb hair, and donning shirts 5/31/19   2. Patient will increase right shoulder strength to 4/5 to improve ease of daily tasks. Progressing. 6/5/19  3. Patient will increase left hip strength to 4+/5 to improve kinetic chain alignment and reduce knee pain with ambulation. 4. Patient will increase functional IR ROM to level of L4 to improve ease of dressing.   Current: Pt able to bring right hand to right PSIS. 5/24/19  5. Patient will increase FOTO score by 22 pts, 66/100, to show improved functional mobility and QOL.     PLAN  []  Upgrade activities as tolerated     [x]  Continue plan of care  []  Update interventions per flow sheet       []  Discharge due to:_  []  Other:_      CECELIA Pike 6/11/2019  2:02 PM    Future Appointments   Date Time Provider Dex [place]   6/11/2019  1:30 PM Vivi Austin [id number] [facility]   6/11/2019  2:00 PM Trude Mates KABWEFX [facility]   6/12/2019  8:00 AM Oj Sanchez, PT MMCPTPB [facility]   6/12/2019  2:30 PM Diane Guevara JXXWUSL [facility]   6/12/2019  3:15 PM Kyara Vera DOCQXXR [facility]   6/17/2019  2:00 PM Abbi Loving PTA MMCPTPB [facility]   6/17/2019  2:30 PM Kyara Vera ZRHOIXP [facility]   6/18/2019 11:15 AM MD CARLY Shaw-[facility] SCHED   6/19/2019  2:45 PM BSV[facility] IMAGING 1 2VV [facility] SCHED   6/21/2019  3:00 PM Abbi Loving PTA MMCPTPB [facility]   6/21/2019  3:30 PM Kyara Vera NGEAPKH [facility]   6/26/2019 11:00 AM [facility] NURSE [facility] SCHED   7/1/2019  1:00 PM Abbi Loving, PTA MMCPTPB [facility]   7/1/2019  1:45 PM Mat Amador, OTR/L MMCPTPB [facility]   7/2/2019  9:20 AM Deyvi Lawton, Merrill Le MD [facility] SCHED   7/3/2019 12:30 PM Yevgeniy Blanco, PT MMCPTPB [facility]   7/3/2019  1:00 PM Mat Amador, OTR/L MMCPTPB [facility]   7/5/2019  9:30 AM Nadia Stephanie, [address] [facility]   7/19/2019 10:30 AM Flaco Eagle PA-C [facility] SCHED   7/22/2019  8:30 AM Nazanin Palumbo  [address]    8/29/2019  4:00 PM Lucero Watters MD [facility] SCHED   10/2/2019  1:00 PM Given, Ramon Marcano MD [id number]

## 2019-06-11 NOTE — PROGRESS NOTES
OT DAILY TREATMENT NOTE 10-18    Patient Name: Cherie Stephen  Date:2019  : 1955  [x]  Patient  Verified  Payor: VA MEDICARE / Plan: VA MEDICARE PART A & B / Product Type: Medicare /    In time:203  Out time:254  Total Treatment Time (min): 51  Visit #: 2 of 16    Medicare/BCBS Only   Total Timed Codes (min):  39 1:1 Treatment Time:  46     Treatment Area: Pain in right hand [M79.641]  Pain in left hand [M79.642]    SUBJECTIVE  Pain Level (0-10 scale): 3/10  Any medication changes, allergies to medications, adverse drug reactions, diagnosis change, or new procedure performed?: [x] No    [] Yes (see summary sheet for update)  Subjective functional status/changes:   [] No changes reported  Pt reports doing tendon glides at home but hasn't been able to do towel scrunches yet     OBJECTIVE    Modality rationale: decrease pain to improve the patients ability to grasp   Min Type Additional Details      [] Estim:  []Unatt       []IFC  []Premod                        []Other:  []w/ice   []w/heat  Position:  Location:      [] Estim: []Att    []TENS instruct  []NMES                    []Other:  []w/US   []w/ice   []w/heat  Position:  Location:      []  Traction: [] Cervical       []Lumbar                       [] Prone          []Supine                       []Intermittent   []Continuous Lbs:  [] before manual  [] after manual      []  Ultrasound: []Continuous   [] Pulsed                           []1MHz   []3MHz W/cm2:  Location:      []  Iontophoresis with dexamethasone         Location: [] Take home patch   [] In clinic      []  Ice     []  heat  []  Ice massage  []  Laser   []  Anodyne Position:  Location:       10 []  Laser with stim  [x]  Other: paraffin Position:  Location:both hands      []  Vasopneumatic Device Pressure:       [] lo [] med [] hi   Temperature: [] lo [] med [] hi       39 min Therapeutic Exercise:  [] See flow sheet :   Rationale: increase ROM and increase strength to improve the patients ability to , grasp    Tendon Glides 10x  Towel Scrunch 10x   Carpal Wedge Stretch- Right  Palmar Stretch- Right  Ligament Release - right  Pt HEP for ligament release with demonstration   PROM Right Digits with focus on hook fist            With   [x] TE   [] TA   [] neuro   [] other: Patient Education: [x] Review HEP    [] Progressed/Changed HEP based on:   [x] positioning   [x] body mechanics   [] transfers   [] heat/ice application   [] Splint wear/care   [] Sensory re-education   [] scar management      [] other:            Other Objective/Functional Measures:     Tightness at [facility] joint  Discomfort with palmar stretch      Pain Level (0-10 scale) post treatment: Left: 1/10, Right- 2-3/10    ASSESSMENT/Changes in Function: Slight decrease in pain post modalities/exercise     Patient will continue to benefit from skilled OT services to modify and progress therapeutic interventions, address ROM deficits, address strength deficits and instruct in home and community integration to attain remaining goals. []  See Plan of Care  []  See progress note/recertification  []  See Discharge Summary         Progress towards goals / Updated goals:  Short Term Goals: To be accomplished in 2 weeks:  1. Patient will report reduced pain in both hands with use of modalities in clinic. Progressing with in clinic activities 6/11/19  2. Patient will be independent in home program to increase thumb abduciton in right and both digit flexion. 3.  Patient will be introduced to joint protection strategies and adaptive equipment to improve self and home care and reduce pain. Progressing with in clinic activities 6/11/19  4. Patient will be independent in home program for hand strengthening.     Long Term Goals: To be accomplished in 8 weeks:              1.  Patient will report pain diminished to 0-2/10 with routine use  2. Patient will increase  strength in both hand by 10 pounds to increase ease of opening jars  3. Patient will incorporate adaptive equipment and strategies to improve independence in self care tasks such as buttoning, cutting, writing etc.  4.  Patient will report improved performance in lifting and carrying tasks as shown by improved FOTO of at least points        PLAN  []  Upgrade activities as tolerated     [x]  Continue plan of care  []  Update interventions per flow sheet       []  Discharge due to:_  []  Other:_      ANDREA Montero 6/11/2019  11:40 AM    Future Appointments   Date Time Provider Dex Hatfield   6/11/2019  1:30 PM [place] PPHOKBF [facility]   6/11/2019  2:00 PM Martha Taisha UVWAUXB [facility]   6/12/2019  8:00 AM Manish Wong PT MMCPTPB [facility]   6/12/2019  2:30 PM Jaclyn Garciaw MMCPTPB [facility]   6/12/2019  3:15 PM Marthaher Sumner GKZCFBN [facility]   6/17/2019  2:00 PM Javi Courtney PTA MMCPTPB [facility]   6/17/2019  2:30 PM Martha Sumner MQYEYXT [facility]   6/18/2019 11:15 AM MD TARI MendezMA-MO [facility] SCHED   6/19/2019  2:45 PM BSVVS IMAGING 1 2VV [facility] SCHED   6/21/2019  3:00 PM Javi Courtney PTA MMCPTPB [facility]   6/21/2019  3:30 PM Martha Sumner BTRFBCF [facility]   6/26/2019 11:00 AM [facility] NURSE [facility] SCHED   7/1/2019  1:00 PM Javi Courtney PTA MMCPTPB [facility]   7/1/2019  1:45 PM TEODORO Carranza/L MMCPTPB [facility]   7/2/2019  9:20 AM Liliananie Kussmaul, Dorothe Portugal, MD [facility] SCHED   7/3/2019 12:30 PM Jonathan Chin, PT MMCPTPB [facility]   7/3/2019  1:00 PM Candice Valentine, OTR/L MMCPTPB [facility]   7/5/2019  9:30 AM Jose Antonio Nicolasn, [address] [facility]   7/19/2019 10:30 AM ESPERANZA [address]   7/22/2019  8:30 AM Merly Lock  [address]   8/29/2019  4:00 PM Talha Rodríguez MD [facility] SCHED   10/2/2019  1:00 PM Amaris Chinchilla MD [address]

## 2019-06-12 ENCOUNTER — HOSPITAL ENCOUNTER (OUTPATIENT)
Dept: PHYSICAL THERAPY | Age: 64
Discharge: HOME | End: 2019-06-12
Payer: MEDICARE

## 2019-06-12 PROCEDURE — 97018 PARAFFIN BATH THERAPY: CPT

## 2019-06-12 PROCEDURE — 97530 THERAPEUTIC ACTIVITIES: CPT

## 2019-06-12 PROCEDURE — 97164 PT RE-EVAL EST PLAN CARE: CPT

## 2019-06-12 PROCEDURE — 97110 THERAPEUTIC EXERCISES: CPT

## 2019-06-12 PROCEDURE — 97016 VASOPNEUMATIC DEVICE THERAPY: CPT

## 2019-06-12 PROCEDURE — 97161 PT EVAL LOW COMPLEX 20 MIN: CPT

## 2019-06-12 NOTE — PROGRESS NOTES
PT DAILY TREATMENT NOTE/KNEE EVAL 10-18    Patient Name: Candice Pan  Date:2019  : 1955  [x]  Patient  Verified  Payor: VA MEDICARE / Plan: VA MEDICARE PART A & B / Product Type: Medicare /    In time: 3:35  Out time:4:24  Total Treatment Time (min): 49  Visit #: 1 of 16    Medicare/BCBS Only   Total Timed Codes (min):  23 1:1 Treatment Time:  45     Treatment Area: Presence of left artificial knee joint [Z96.652]  Iliotibial band syndrome, left leg [M76.32]    SUBJECTIVE  Pain Level (0-10 scale): 3/10  []constant []intermittent []improving []worsening []no change since onset    Any medication changes, allergies to medications, adverse drug reactions, diagnosis change, or new procedure performed?: [x] No    [] Yes (see summary sheet for update)  Subjective functional status/changes:     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 Nelda Mistry is a 74 y/o, M pt with CC of Left knee/ITB pain. Pt had Left TKR in 3-2017; pt noticed pain increased along lat side of Left thigh in . Pain worsened during sit to stand, eased mod after walking. . Imaging done including X-ray showing tendonitis of ITB. Previous Treatment/Compliance:   PMHx/Surgical Hx:   Work Hx:   Living Situation:   Pt Goals: \"stiffness to go away and no pain\"  Barriers: []pain []financial []time []transportation []other  Motivation:   Substance use: []Alcohol []Tobacco []other:   FABQ Score: []low []elevate  Cognition: A & O x     Other:    OBJECTIVE/EXAMINATION  Domestic Life:  Activity/Recreational Limitations:   Mobility:  Self Care:          Modality rationale: decrease edema, decrease inflammation and decrease pain to improve the patients ability to tolerate ADLs/amb   Min Type Additional Details    [] Estim:  []Unatt       []IFC  []Premod                        []Other:  []w/ice []w/heat  Position:  Location:    [] Estim: []Att    []TENS instruct  []NMES                    []Other:  []w/US   []w/ice   []w/heat  Position:  Location:    []  Traction: [] Cervical       []Lumbar                       [] Prone          []Supine                       []Intermittent   []Continuous Lbs:  [] before manual  [] after manual    []  Ultrasound: []Continuous   [] Pulsed                           []1MHz   []3MHz Location:  W/cm2:    []  Iontophoresis with dexamethasone         Location: [] Take home patch   [] In clinic    []  Ice     []  heat  []  Ice massage  []  Laser   []  Anodyne Position:  Location:    []  Laser with stim  []  Other: Position:  Location:   10 [x]  Vasopneumatic Device Pressure:       [x] lo [] med [] hi   Temperature: [x] lo [] med [] hi   [x] Skin assessment post-treatment:  [x]intact []redness- no adverse reaction    []redness - adverse reaction:     16 min [x]Eval                  []Re-Eval       23 min Therapeutic Activity:  []  See flow sheet :Pt edu within scope of practice on prognosis, POC, knee anatomy, modalities use, positioning and ankle pump for swelling management, DTM/STM with tennis ball and rolling pin.  Reviewed HEP     Rationale: increase ROM, increase strength, improve coordination, improve balance and increase proprioception  to improve the patients ability to amb with ease and safety          With   [] TE   [] TA   [] neuro   [] other: Patient Education: [x] Review HEP    [] Progressed/Changed HEP based on:   [] positioning   [] body mechanics   [] transfers   [] heat/ice application    [] other:      Other Objective/Functional Measures:     Physical Therapy Evaluation - Knee    Posture: [x] min Varus    [] Valgus    [] Recurvatum        [] Tibial Torsion    [] Foot Supination    [] Foot Pronation    Describe:    Gait:  [] Normal    [] Abnormal    [x] Antalgic    [] NWB    Device:    Describe:    ROM / Strength  [] Unable to assess                  AROM PROM                   Strength (1-5)    Left Right Left Right Left Right   Hip Flexion     3 4-    Extension     3- 3-    Abduction     3- 3-    Adduction     3- 3-   Knee Flexion 105 120   4 4+    Extension 0 +2   4+ 5   Ankle Plantarflexion     ~3 ~3    Dorsiflexion     5 5       Flexibility: [] Unable to assess at this time  Hamstrings:    (L) Tightness= [] WNL   [x] Min   [] Mod   [] Severe    (R) Tightness= [] WNL   [x] Min   [] Mod   [] Severe  Quadriceps:    (L) Tightness= [] WNL   [] Min   [] Mod   [x] Severe    (R) Tightness= [] WNL   [] Min   [x] Mod   [] Severe  Gastroc:      (L) Tightness= [] WNL   [] Min   [x] Mod   [] Severe    (R) Tightness= [] WNL   [x] Min   [] Mod   [] Severe  Other:    Palpation:   Neg/Pos  Neg/Pos  Neg/Pos   Joint Line  Quad tendon  Patellar ligament    Patella  Fibular head  Pes Anserinus    Tibial tubercle  Hamstring tendons  Infrapatellar fat pad      Optional Tests:  Patellar Positioning (Static)   []L []R Normal []L []R Lateral   []L []R [place]      []L []R Medial   []L []R [place]    Patellar Tracking   []L []R Glide (Lat)   []L []R Tilt (Lat)     []L []R Glide (Med)  []L []R Tilt (Med)      []L []R Tile (Inf)     Patellar Mobility   []L []R Hypermobile []L []R Hypomobile         Girth Measurements:     Cm at  Cm above joint line   Cm at   Cm below joint line  Cm at joint line   Left        Right           Lachmans  [] Neg    [] Pos Posterior Drawer [x] Neg    [] Pos  Pivot Shift  [] Neg    [] Pos Posterior Sag  [] Neg    [] Pos  PATY   [] Neg    [] Pos Vivien's Test [] Neg    [] Pos  ALRI   [] Neg    [] Pos Squat   [] Neg    [] Pos  Valgus@ 0 Degrees [] Neg    [] Pos Jhon [] Neg    [] Pos  Valgus@ 30 Degrees [] Neg    [] Pos Patellar Apprehension [] Neg    [] Pos  Varus@ 0 Degrees [] Neg    [] Pos Sidhu's Compression [] Neg    [] Pos  Varus@ 30 Degrees [] Neg    [] Pos Ely's Test  [] Neg    [x] Pos  Apley's Compression [] Neg    [] Pos Madeleine's Test  [] Neg    [] Pos  Apley's Distraction [] Neg    [] Pos Stroke Test  [] Neg    [] Pos   Anterior Drawer [x] Neg    [] Pos Fluctuation Test [] Neg    [] Pos  Other:                  [] Neg    [] Pos                 Other tests/comments:       Pain Level (0-10 scale) post treatment: 2/10    ASSESSMENT/Changes in Function: see POC    Patient will continue to benefit from skilled PT services to modify and progress therapeutic interventions, address functional mobility deficits, address ROM deficits, address strength deficits, analyze and address soft tissue restrictions, analyze and cue movement patterns, analyze and modify body mechanics/ergonomics, assess and modify postural abnormalities, address imbalance/dizziness and instruct in home and community integration to attain remaining goals.      [x]  See Plan of Care  []  See progress note/recertification  []  See Discharge Summary         Progress towards goals / Updated goals:  See POC    PLAN  [x]  Upgrade activities as tolerated     [x]  Continue plan of care  []  Update interventions per flow sheet       []  Discharge due to:_  []  Other:_      Keli Bello PT 6/12/2019  3:10 PM

## 2019-06-12 NOTE — PROGRESS NOTES
PT DAILY TREATMENT NOTE 10-18    Patient Name: Antonia Frye  Date:2019  : 1955  [x]  Patient  Verified  Payor: VA MEDICARE / Plan: VA MEDICARE PART A & B / Product Type: Medicare /    In time: 230  Out time: 302  Total Treatment Time (min): 32  Visit #: 21 of     Medicare/BCBS Only   Total Timed Codes (min):  32 1:1 Treatment Time: 30       Treatment Area: Pain in left knee [M25.562]  Pain in right shoulder [M25.511]    SUBJECTIVE  Pain Level (0-10 scale): 1/10 sh., 3/10 knee  Any medication changes, allergies to medications, adverse drug reactions, diagnosis change, or new procedure performed?: [x] No    [] Yes (see summary sheet for update)  Subjective functional status/changes:   [] No changes reported  . I have tendonitis. Has had pain in knee all year and getting worse and has PT evaluation today for knee. Has trouble putting on shirt, knee high stocking, and reaching high cabinets. Im moving real good with very little pain.     OBJECTIVE    Modality rationale: decrease inflammation to improve the patients ability to increase ease with ADLs   Min Type Additional Details    [] Estim:  []Unatt       []IFC  []Premod                        []Other:  []w/ice   []w/heat  Position:  Location:    [] Estim: []Att    []TENS instruct  []NMES                    []Other:  []w/US   []w/ice   []w/heat  Position:  Location:    []  Traction: [] Cervical       []Lumbar                       [] Prone          []Supine                       []Intermittent   []Continuous Lbs:  [] before manual  [] after manual    []  Ultrasound: []Continuous   [] Pulsed                           []1MHz   []3MHz W/cm2:  Location:    []  Iontophoresis with dexamethasone         Location: [] Take home patch   [] In clinic    []  Ice     []  heat  []  Ice massage  []  Laser   []  Anodyne Position:  Location:    []  Laser with stim  []  Other:  Position:  Location:   TC [x]  Vasopneumatic Device Pressure:       [x] lo [] med [] hi   Temperature: [x] lo [] med [] hi   [x] Skin assessment post-treatment:  [x]intact []redness- no adverse reaction    []redness - adverse reaction:       32 min Therapeutic Exercise:  [x] See flow sheet :   Rationale: increase ROM and increase strength to improve the patients ability to increase ease with ADLs      With   [x] TE   [] TA   [] neuro   [] other: Patient Education: [x] Review HEP    [] Progressed/Changed HEP based on:   [] positioning   [] body mechanics   [] transfers   [] heat/ice application    [] other:      Other Objective/Functional Measures:   PROM: 135 degrees flexion, 66 degrees ER, and 58 degrees IR  3 pt regression in FOTO score  4-/5 MMT ER  4/5 MMT shoulder flexion  Functional IR to sacrum         Pain Level (0-10 scale) post treatment: 0-1/10 sh., 2/10 knee    ASSESSMENT/Changes in Function: See re-certification. Patient will continue to benefit from skilled PT services to modify and progress therapeutic interventions, address functional mobility deficits, address ROM deficits and address strength deficits to attain remaining goals.      []  See Plan of Care  [x]  See progress note/recertification  []  See Discharge Summary         Progress towards goals / Updated goals:  See re-certification       PLAN  [x]  Upgrade activities as tolerated     [x]  Continue plan of care  []  Update interventions per flow sheet       []  Discharge due to:_  []  Other:_      Javy Sahni, PT, LPTA 6/12/2019  2:02 PM    Future Appointments   Date Time Provider Dex Hazeli   6/12/2019  2:30 PM Leana Samaniego, PT [id number] [facility]   6/12/2019  3:30 PM Soni Gilliam [id number] [facility]   6/12/2019  4:45 PM Charity Mantle NPUDDJF [facility]   6/17/2019  2:00 PM Kyung Leon PTA AIXEROI [facility]   6/17/2019  2:30 PM Charity Mantle DKNPPMZ [facility]   6/18/2019 11:15 AM MD CARLY Marcano-REBECCA SILVA   6/19/2019  2:45 PM BSV[facility] IMAGING 1 2VV ROXANNE SILVA   6/21/2019  3:00 PM Kyung Leon PTA QRFZFOR [facility]   6/21/2019  3:30 PM Marilou Humphrey UDJJLJZ [facility]   6/26/2019 11:00 AM [facility] NURSE [facility] ROXANNE SCHED   7/1/2019  1:00 PM Shailajoshua Topete, PTA MMCPTPB [facility]   7/1/2019  1:45 PM Kole Lagos, OTR/L MMCPTPB [facility]   7/2/2019  9:20 AM Maricel Silver MD [facility] ROXANNE SCHED   7/3/2019 12:30 PM Rosangela Monroe, PT MMCPTPB [facility]   7/3/2019  1:00 PM Kole Lagos, OTR/L MMCPTPB [facility]   7/5/2019  9:30 AM Otilio Negro, [address] [facility]   7/19/2019 10:30 AM ESPERANZA [address]   7/22/2019  8:30 AM Jodee Fonseca  [address]   8/29/2019  4:00 PM Solange Acharya MD [facility] SCHED   10/2/2019  1:00 PM Kevin Chinchilla MD [address]

## 2019-06-12 NOTE — PROGRESS NOTES
[facility] [place] [employer] [facility]  [address]  [phone] fax    Continued Plan of Care/ Re-certification for Physical Therapy Services    Patient name: Reij Winters Start of Care: 4/16/2019   Referral Ildefonso Lindsey MD QAJ: 5/35/1765               Medical Diagnosis: Other specified postprocedural states [Z98.890]  Presence of left artificial knee joint [Z96.652]  Payor: VA MEDICARE / Plan: VA MEDICARE PART A & B / Product Type: Medicare /  Onset Date:3/29/2019               Treatment Diagnosis: right shoulder pain and left knee pain   Prior Hospitalization: see medical history Provider#: [id number]   Medications: Verified on Patient summary List    Comorbidities: arthritis, high blood pressure, prostate ca s/p sx, left TKA   Prior Level of 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 Status at last note/certification: 53 degrees ER @ 45, 50 degrees IR @ 45, 88 degrees abduction, 125 degrees flexion   Current Status: met    Goal:Patient will increase right shoulder strength to 4/5 to improve ease of daily tasks. Status at last note/certification: progressing  Current Status: not met    Goal:Patient will increase left hip strength to 4+/5 to improve kinetic chain alignment and reduce knee pain with ambulation. Status at last note/certification: fair hip strength   Current Status: not met    Goal:Patient will increase functional IR ROM to level of L4 to improve ease of dressing. Status at last note/certification:sacrum  Current Status: not met    Goal:Patient will increase FOTO score by 22 pts, 66/100, to show improved functional mobility and QOL.   Status at last note/certification: 65/126  Current Status: not met    Key functional changes: improved shoulder ROM and strength      Problems/ barriers to goal attainment: tight posterior capsule     Problem List: pain affecting function, decrease ROM, decrease strength, decrease ADL/ functional abilitiies and decrease flexibility/ joint mobility    Treatment Plan: Therapeutic exercise, Therapeutic activities, Neuromuscular re-education, Physical agent/modality, Manual therapy, Patient education and Self Care training     Patient Goal (s) has been updated and includes: improve ROM/strength for dressing and to reach cabinet above shoulder height    Goals for this certification period to be accomplished in 4 weeks:  1. Patient will increase right shoulder strength to 4/5 to improve ease of daily tasks. 2. Patient will increase functional IR ROM to level of L4 to improve ease of dressing. 3. Patient will report little difficulty reaching self overhead to restore ease of ADL's. Frequency / Duration: Patient to be seen 2-3 times per week for 4 weeks:    Assessment / Recommendations:Patient is making slow, steady progress towards long term goals, meeting 1/5. He demonstrates improved PROM; 135 degrees flexion, 66 degrees ER, and 58 degrees IR. Pt reports improved ease of self care tasks, including grooming and dressing. He continues to have difficulty donning compression sock and shirt, along with reaching for shelf above shoulder height. His shoulder strength has improved to 4/5 flexion and 4-/5 ER. Patient will continue to benefit from skilled PT to address end range flexion, functional IR ROM, and shoulder strength to improve ease of dressing and reaching to perform ADL's with ease.       Certification Period: 6/12/2019 to 7/11/2019    Rob Craig, PT 6/12/2019 2:28 PM    ________________________________________________________________________  I certify that the above Therapy Services are being furnished while the patient is under my care. I agree with the treatment plan and certify that this therapy is necessary. [] I have read the above and request that my patient continue as recommended.   [] I have read the above report and request that my patient continue therapy with the following changes/special instructions: _______________________________________  [] I have read the above report and request that my patient be discharged from therapy    Physician's Signature:____________Date:_________TIME:________    ** Signature, Date and Time must be completed for valid certification **    Please sign and return to [facility]  [address] [place] [address]  [phone] fax

## 2019-06-12 NOTE — PROGRESS NOTES
OT DAILY TREATMENT NOTE 10-18    Patient Name: Indigo Ramos  Date:2019  : 1955  [x]  Patient  Verified  Payor: VA MEDICARE / Plan: VA MEDICARE PART A & B / Product Type: Medicare /    In time:435  Out time:520  Total Treatment Time (min): 45  Visit #: 3 of 16    Medicare/BCBS Only   Total Timed Codes (min):  35 1:1 Treatment Time:  45     Treatment Area: Pain in right hand [M79.641]  Pain in left hand [M79.642]    SUBJECTIVE  Pain Level (0-10 scale): 310  Any medication changes, allergies to medications, adverse drug reactions, diagnosis change, or new procedure performed?: [x] No    [] Yes (see summary sheet for update)  Subjective functional status/changes:   [] No changes reported  I am glad I started coming to yall     OBJECTIVE    Modality rationale: decrease inflammation, decrease pain and increase tissue extensibility to improve the patients ability to grasp   Min Type Additional Details    [] Estim:  []Unatt       []IFC  []Premod                        []Other:  []w/ice   []w/heat  Position:  Location:    [] Estim: []Att    []TENS instruct  []NMES                    []Other:  []w/US   []w/ice   []w/heat  Position:  Location:    []  Traction: [] Cervical       []Lumbar                       [] Prone          []Supine                       []Intermittent   []Continuous Lbs:  [] before manual  [] after manual    []  Ultrasound: []Continuous   [] Pulsed                           []1MHz   []3MHz Location:  W/cm2:    []  Iontophoresis with dexamethasone         Location: [] Take home patch   [] In clinic    []  Ice     []  heat  []  Ice massage  []  Laser   []  Anodyne Position:  Location:   10 []  Laser with stim  [x]  Other: Paraffin  Position:  Location: B Hands    []  Vasopneumatic Device Pressure:       [] lo [] med [] hi   Temperature: [] lo [] med [] hi   [x] Skin assessment post-treatment:  [x]intact []redness- no adverse reaction  []redness - adverse reaction:     35 min Therapeutic Exercise:  [] See flow sheet :   Rationale: increase ROM and increase strength to improve the patients ability to grasp    Tendon Glides 10x  Towel Scrunch 10x   Carpal Wedge Stretch- Right  Palmar Stretch- Right  Ligament Release - right  \"C\" Stretch with B Hands      With   [] TE   [] TA   [] neuro   [] other: Patient Education: [x] Review HEP    [] Progressed/Changed HEP based on:   [] positioning   [] body mechanics   [] transfers   [] heat/ice application   [] Splint wear/care   [] Sensory re-education   [] scar management      [] other:            Other Objective/Functional Measures: Mod Difficulty with \"C\" Stretches      Pain Level (0-10 scale) post treatment: Left 1/10  Right 2/10    ASSESSMENT/Changes in Function: Pain decreased with modalities and exercise     Patient will continue to benefit from skilled OT services to modify and progress therapeutic interventions, address ROM deficits, address strength deficits and instruct in home and community integration to attain remaining goals. []  See Plan of Care  []  See progress note/recertification  []  See Discharge Summary         Progress towards goals / Updated goals:  Short Term Goals: To be accomplished in 2 weeks:  1.  Patient will report reduced pain in both hands with use of modalities in clinic. Progressing with in clinic activities 6/11/19  2. Rizwana Khalil will be independent in home program to increase thumb abduciton in right and both digit flexion. Progressing with in clinic activities 6/12/19  3.  Patient will be introduced to joint protection strategies and adaptive equipment to improve self and home care and reduce pain. Progressing with in clinic activities 6/11/19  4.  Patient will be independent in home program for hand strengthening.     Long Term Goals: To be accomplished in 8 weeks:              1.  Patient will report pain diminished to 0-2/10 with routine use   2.  Patient will increase  strength in both hand by 10 pounds to increase ease of opening jars  3.  Patient will incorporate adaptive equipment and strategies to improve independence in self care tasks such as buttoning, cutting, writing etc.  4.  Patient will report improved performance in lifting and carrying tasks as shown by improved FOTO of at least points        PLAN  []  Upgrade activities as tolerated     [x]  Continue plan of care  []  Update interventions per flow sheet       []  Discharge due to:_  []  Other:_      Dinolynne Reddy ,HARVEY/L 6/12/2019  10:55 AM    Future Appointments   Date Time Provider Dex Alysia   6/17/2019  1:30 PM Charles Ngo [id number] [facility]   6/17/2019  2:00 PM Sherrel Primer, PTA MMCPTPB [facility]   6/17/2019  2:30 PM Sonia Hall [id number] [facility]   6/18/2019 11:15 AM Alo Aguilar [facility] ROXANNE SCHED   6/19/2019  2:45 PM BSVVS IMAGING 1 2VV ROXANNE SCHED   6/21/2019  2:30 PM Jessi Talbot PT MMCPTPB [facility]   6/21/2019  3:00 PM Sherrel Primer, PTA MMCPTPB [facility]   6/21/2019  3:30 PM Sonia Hall MILGJDO [facility]   6/26/2019 11:00 AM [place] [address]   7/1/2019 12:30 PM Lien Benítez PTA MMCPTPB [facility]   7/1/2019  1:00 PM Sherrel Primer, PTA MMCPTPB [facility]   7/1/2019  1:45 PM Louisa Almeida OTR/L MMCPTPB [facility]   7/2/2019  9:20 AM Meeta Ji MD [facility] ROXANNE SCHED   7/3/2019 11:45 AM Sonia Hall IUGVLWV [facility]   7/3/2019 12:30 PM Lyndsey Norman, PT MMCPTPB [facility]   7/3/2019  1:00 PM Lyndsey Norman, PT MMCPTPB [facility]   7/5/2019  9:30 AM Krishan Thompson, [address] [facility]   7/19/2019 10:30 AM Shade Corona PA-C [facility] [address]   7/22/2019  8:30 AM Polly Bray  [address]   8/29/2019  4:00 PM Alo Aguilar [facility]   10/2/2019  1:00 PM Estrada Chinchilla MD [address] [facility]

## 2019-06-12 NOTE — PROGRESS NOTES
[facility]  [address] [facility]  [phone] fax    Plan of Care/ Statement of Necessity for Physical Therapy Services    Patient name: Kate Garcia Start of Care: 2019   Referral source: [place], [place] : 1955    Medical Diagnosis: Presence of left artificial knee joint [Z96.652]  Iliotibial band syndrome, left leg [M76.32]  Payor: VA MEDICARE / Plan: VA MEDICARE PART A & B / Product Type: Medicare /  Onset Date: about 6 months ago    Treatment Diagnosis: Left knee and thigh pain   Prior Hospitalization: see medical history Provider#: [id number]   Medications: Verified on Patient summary List    Comorbidities: arthritis, HTN, Left TKR 3-2018, prostate cancer surgery   Prior Level of Function: 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 Assessment/ martinez information: Mr. Wendie Phillips is a 76 y/o, M pt with CC of Left knee/ITB pain. Pt had Left TKR in 3-2017; pt noticed pain increased along lat side of Left thigh in . Pain worsened during sit to stand, eased mod after walking. . Imaging done including X-ray showing tendonitis of ITB. Pt present with mo decreased flex ROM or Left knee, poor strength of deep hips, fair strength with core and B knees. Mod TTP along lat pole of Left patella and ITB. Pt demonstrates min antalgic gait pattern, wild REGINALDO, and min Trendelenburg. He also present with mod swelling of B LEs; pt reports venous insufficient problem but ruling out blood clot recently. Pt would benefit from skilled PT to address these deficits above to improve the ability to amb comfortably and safely.     Evaluation Complexity History HIGH Complexity :3+ comorbidities / personal factors will impact the outcome/ POC ; Examination MEDIUM Complexity : 3 Standardized tests and measures addressing body structure, function, activity limitation and / or participation in recreation  ;Presentation LOW Complexity : Stable, uncomplicated  ;Clinical Decision Making MEDIUM Complexity : FOTO score of 26-74  Overall Complexity Rating: LOW   Problem List: pain affecting function, decrease ROM, decrease strength, edema affecting function, impaired gait/ balance, decrease ADL/ functional abilitiies, decrease activity tolerance, decrease flexibility/ joint mobility and decrease transfer abilities   Treatment Plan may include any combination of the following: Therapeutic exercise, Therapeutic activities, Neuromuscular re-education, Physical agent/modality, Gait/balance training, Manual therapy, Patient education, Self Care training, Functional mobility training, Home safety training and Stair training  Patient / Family readiness to learn indicated by: asking questions, trying to perform skills and interest  Persons(s) to be included in education: patient (P)  Barriers to Learning/Limitations: None  Patient Goal (s): stiffness to go away and no pain  Patient Self Reported Health Status: fair  Rehabilitation Potential: good    Short term goals: To be accomplished within 1 week  1. Pt will be independent with HEP to maintain progression.     Long term goals: To be accomplished within 8 weeks  1. Pt will improve FOTO score by 10 points to 57/100 to show improvement with functional mobility performance. 2. Pt will have Left knee AROM improved to 0-115 degs at least to amb household and community with normal and safe pattern. 3. Pt will have B hip abd, ext strength improved to at least 4/5 to be able to amb longer distance and navigate stair safely. 4. Pt will report no more than 1-2/10 pain at worst so he can amb and perform ADLs with ease. Frequency / Duration: Patient to be seen 2 times per week for 8 weeks.     Patient/ CarPatient/ Caregiver education and instruction: Diagnosis, prognosis, self care, activity modification, brace/ splint application and exercises   [x]  Plan of care has been reviewed with PTA    Certification Period: 6- to 8-    Yoel Uribe, PT 6/12/2019 5:24 PM    ________________________________________________________________________    I certify that the above Therapy Services are being furnished while the patient is under my care. I agree with the treatment plan and certify that this therapy is necessary. Physician's Signature:____________Date:_________TIME:________    ** Signature, Date and Time must be completed for valid certification **    Please sign and return to [facility] [address]  [phone] fax          Yoel Uribe, PT 6/12/2019 3:11 PM    ________________________________________________________________________    I certify that the above Therapy Services are being furnished while the patient is under my care. I agree with the treatment plan and certify that this therapy is necessary.     Physician's Signature:____________Date:_________TIME:________    ** Signature, Date and Time must be completed for valid certification **    Please sign and return to [facility] [address]  [phone] fax

## 2019-06-13 ENCOUNTER — TELEPHONE (OUTPATIENT)
Dept: ORTHOPEDIC SURGERY | Age: 64
End: 2019-06-13

## 2019-06-17 ENCOUNTER — HOSPITAL ENCOUNTER (OUTPATIENT)
Dept: PHYSICAL THERAPY | Age: 64
Discharge: HOME | End: 2019-06-17
Payer: MEDICARE

## 2019-06-17 DIAGNOSIS — K21.9 GASTROESOPHAGEAL REFLUX DISEASE WITHOUT ESOPHAGITIS: ICD-10-CM

## 2019-06-17 PROCEDURE — 97140 MANUAL THERAPY 1/> REGIONS: CPT

## 2019-06-17 PROCEDURE — 97110 THERAPEUTIC EXERCISES: CPT

## 2019-06-17 PROCEDURE — 97018 PARAFFIN BATH THERAPY: CPT

## 2019-06-17 RX ORDER — LANSOPRAZOLE 30 MG/1
CAPSULE, DELAYED RELEASE ORAL
Qty: 90 CAP | Refills: 3 | Status: SHIPPED | OUTPATIENT
Start: 2019-06-17 | End: 2019-10-22 | Stop reason: SDUPTHER

## 2019-06-17 NOTE — PROGRESS NOTES
OT DAILY TREATMENT NOTE 10-18    Patient Name: Johnna Bass  Date:2019  : 1955  [x]  Patient  Verified  Payor: [place] MEDICARE / Plan: [place] MEDICARE PART A & B / Product Type: Medicare /    In time:230  Out time:315  Total Treatment Time (min): 45  Visit #: 4 of 16    Medicare/BCBS Only   Total Timed Codes (min):  35 1:1 Treatment Time:  45     Treatment Area: Pain in right hand [M79.641]  Pain in left hand [M79.642]    SUBJECTIVE  Pain Level (0-10 scale): 3/10  Any medication changes, allergies to medications, adverse drug reactions, diagnosis change, or new procedure performed?: [x] No    [] Yes (see summary sheet for update)  Subjective functional status/changes:   [] No changes reported  They feel better today but last night they were up to like a 6    OBJECTIVE    Modality rationale: decrease inflammation, decrease pain and increase tissue extensibility to improve the patients ability to grasp   Min Type Additional Details     [] Estim:  []Unatt       []IFC  []Premod                        []Other:  []w/ice   []w/heat  Position:  Location:     [] Estim: []Att    []TENS instruct  []NMES                    []Other:  []w/US   []w/ice   []w/heat  Position:  Location:     []  Traction: [] Cervical       []Lumbar                       [] Prone          []Supine                       []Intermittent   []Continuous Lbs:  [] before manual  [] after manual     []  Ultrasound: []Continuous   [] Pulsed                           []1MHz   []3MHz Location:  W/cm2:     []  Iontophoresis with dexamethasone         Location: [] Take home patch   [] In clinic     []  Ice     []  heat  []  Ice massage  []  Laser   []  Anodyne Position:  Location:   10 []  Laser with stim  [x]  Other: Paraffin  Position:  Location: B Hands     []  Vasopneumatic Device Pressure:       [] lo [] med [] hi   Temperature: [] lo [] med [] hi   [x] Skin assessment post-treatment:  [x]intact []redness- no adverse reaction  []redness - adverse reaction:       35 min Therapeutic Exercise:  [] See flow sheet :   Rationale: increase ROM and increase strength to improve the patients ability to grasp     Tendon Glides 10x  Towel Scrunch 10x   Palmar Stretch- Right  Ligament Release - right  \"C\" Stretch with B Hands              With   [x] TE   [] TA   [] neuro   [] other: Patient Education: [x] Review HEP    [] Progressed/Changed HEP based on:   [] positioning   [] body mechanics   [] transfers   [] heat/ice application   [] Splint wear/care   [] Sensory re-education   [] scar management      [] other:            Other Objective/Functional Measures:     Decreased tenderness with palmar stretch      Pain Level (0-10 scale) post treatment: Left 1/10, Right 2/10    ASSESSMENT/Changes in Function: Pt education on using biggest joints to complete tasks (ie. Turning key)     Patient will continue to benefit from skilled OT services to modify and progress therapeutic interventions, address ROM deficits, address strength deficits and instruct in home and community integration to attain remaining goals. []  See Plan of Care  []  See progress note/recertification  []  See Discharge Summary         Progress towards goals / Updated goals:  Short Term Goals: To be accomplished in 2 weeks:  1.  Patient will report reduced pain in both hands with use of modalities in clinic. Met consistently with Paraffin use 6/17/19  2.  Patient will be independent in home program to increase thumb abduciton in right and both digit flexion. Progressing with in clinic activities 6/12/19  3.  Patient will be introduced to joint protection strategies and adaptive equipment to improve self and home care and reduce pain. Progressing with in clinic activities 6/17/19  4.  Patient will be independent in home program for hand strengthening.     Long Term Goals: To be accomplished in 8 weeks:              1.  Patient will report pain diminished to 0-2/10 with routine use   2.  Patient will increase  strength in both hand by 10 pounds to increase ease of opening jars  3.  Patient will incorporate adaptive equipment and strategies to improve independence in self care tasks such as buttoning, cutting, writing etc.  4.  Patient will report improved performance in lifting and carrying tasks as shown by improved FOTO of at least points           PLAN  []  Upgrade activities as tolerated     [x]  Continue plan of care  []  Update interventions per flow sheet       []  Discharge due to:_  []  Other:_      ANDREA Gutiérrez 6/17/2019  9:05 AM    Future Appointments   Date Time Provider Dex [place]   6/17/2019  1:30 PM Jorene Kanner UWLSLRN [facility]   6/17/2019  2:00 PM Emely Comings, PTA MMCPTPB [facility]   6/17/2019  2:30 PM Voljuan luis Valverde LQOMDIU [facility]   6/18/2019 11:15 AM Noel Cao MD [facility] ROX[facility]NE SCHED   6/19/2019  2:45 PM [facility] SCHED   6/21/2019  2:30 PM Trip Echevarria PT MMCPTPB [facility]   6/21/2019  3:00 PM Emely Comings, PTA MMCPTPB [facility]   6/21/2019  3:30 PM Voljuan luis Valverde IBNEJGY [facility]   6/26/2019 11:00 AM [facility] [address]   7/1/2019 12:30 PM Norman Rosen PTA MMCPTPB [facility]   7/1/2019  1:00 PM Emely Comings, PTA MMCPTPB [facility]   7/1/2019  1:45 PM TEODORO Cunha/L MMCPTPB [facility]   7/2/2019  9:20 AM Graciela Lawrence MD [facility] ROX[facility]NE SCHED   7/3/2019 11:45 AM Voljuan luis Valverde ZEEGXUV [facility]   7/3/2019 12:30 PM Laura Ricardo, PT MMCPTPB [facility]   7/3/2019  1:00 PM Laura Ricardo, PT MMCPTPB [facility]   7/5/2019  9:30 AM Ester Sheriffkman, [address] [facility]   7/19/2019 10:30 AM Charmayne Hammers, PA-C [facility]   7/22/2019  8:30 AM Kourtney Thomas  [address]   8/29/2019  4:00 PM Noel Cao MD [facility]   10/2/2019  1:00 PM Sabina Chinchilla MD [address] [facility]

## 2019-06-17 NOTE — PROGRESS NOTES
PT DAILY TREATMENT NOTE 10-18    Patient Name: Hung Gorman  Date:2019  : 1955  [x]  Patient  Verified  Payor: [place] MEDICARE / Plan: [place] MEDICARE PART A & B / Product Type: Medicare /    In time:200  Out time:230  Total Treatment Time (min): 30  Visit #: 22 of     Medicare/BCBS Only   Total Timed Codes (min):  30 1:1 Treatment Time:  30       Treatment Area: Pain in left knee [M25.562]  Pain in right shoulder [M25.511]    SUBJECTIVE  Pain Level (0-10 scale): 3/10  Any medication changes, allergies to medications, adverse drug reactions, diagnosis change, or new procedure performed?: [x] No    [] Yes (see summary sheet for update)  Subjective functional status/changes:   [] No changes reported  Pt stated that he cont to have difficulty with sleeping    OBJECTIVE    22 min Therapeutic Exercise:  [x] See flow sheet :   Rationale: increase ROM and increase strength to improve the patients ability to increase ease with ADLs    8 min Manual Therapy:  PROM with [facility] mobs   Rationale: decrease pain, increase ROM and increase tissue extensibility to increase ease with ADLs    With   [x] TE   [] TA   [] neuro   [] other: Patient Education: [x] Review HEP    [] Progressed/Changed HEP based on:   [] positioning   [] body mechanics   [] transfers   [] heat/ice application    [] other:      Other Objective/Functional Measures:   Had no difficulty with exercises  Cont with poor IR  Flex is improving     Pain Level (0-10 scale) post treatment: 1-2/10    ASSESSMENT/Changes in Function:   Pt is making slow progress toward goals. Pt cont with decreased strength and range of motion in right sh. Cont to have difficulty with sleep and performing ADLs    Patient will continue to benefit from skilled PT services to modify and progress therapeutic interventions, address functional mobility deficits, address ROM deficits and address strength deficits to attain remaining goals.      []  See Plan of Care  [x]  See progress note/recertification  []  See Discharge Summary         Progress towards goals / Updated goals:  1. Patient will increase right shoulder strength to 4/5 to improve ease of daily tasks. 2. Patient will increase functional IR ROM to level of L4 to improve ease of dressing. 3. Patient will report little difficulty reaching self overhead to restore ease of ADL's. Progressing.  6/17/19    PLAN  []  Upgrade activities as tolerated     [x]  Continue plan of care  []  Update interventions per flow sheet       []  Discharge due to:_  []  Other:_      Claudell Dye, PTA 6/17/2019  1:35 PM    Future Appointments   Date Time Provider Dex Alysia   6/17/2019  2:00 PM Bahman Mccloud, PTA MMCPTPB [facility]   6/17/2019  2:30 PM Sonia Hall NARFBGQ [facility]   6/18/2019 11:15 AM Alo Aguilar MD [facility] SCHED   6/19/2019  2:45 PM [facility] 1 2VV [facility] SCHED   6/21/2019  2:30 PM Jessi Talbot, PT PTQASFU [facility]   6/21/2019  3:00 PM Bahman Primer, PTA MMCPTPB [facility]   6/21/2019  3:30 PM Sonia Hall HVPIJWK [facility]   6/26/2019 11:00 AM [facility] NURSE [facility] R[facility] SCHED   7/1/2019 12:30 PM Lien Benítez PTA MMCPTPB [facility]   7/1/2019  1:00 PM Bahman Primer, PTA MMCPTPB [facility]   7/1/2019  1:45 PM Louisa Almeida OTR/L MMCPTPB [facility]   7/2/2019  9:20 AM Alex Howard MD [facility] ROX[facility]NE SCHED   7/3/2019 11:45 AM Sonia Hall HSZTOYY [facility]   7/3/2019 12:30 PM Lyndsey Norman, PT MMCPTPB [facility]   7/3/2019  1:00 PM Lyndsey Norman, PT MMCPTPB [facility]   7/5/2019  9:30 AM Krishan Lopezzeina, [address] [facility]   7/19/2019 10:30 AM Shade Corona PA-C [facility] ROXANNE SCHED   7/22/2019  8:30 AM Polly Bray  [address]   8/29/2019  4:00 PM Alo Aguilar MD [facility] SCHED   10/2/2019  1:00 PM Estrada Chinchilla MD [address] [facility]

## 2019-06-17 NOTE — PROGRESS NOTES
PT DAILY TREATMENT NOTE    Patient Name: Samaria Heard  Date:2019  : 1955  [x]  Patient  Verified  Payor: Gen Mejia / Plan: [place] MEDICARE PART A & B / Product Type: Medicare /    In time: 1:32  Out time: 2:00  Total Treatment Time (min): 28  Visit #: 2 of 16    Medicare/BCBS Only   Total Timed Codes (min):  28 1:1 Treatment Time:  28     Treatment Area: Presence of left artificial knee joint [Z96.652]  Iliotibial band syndrome, left leg [M76.32]    SUBJECTIVE  Pain Level (0-10 scale): 3/10  []constant []intermittent []improving []worsening []no change since onset    Any medication changes, allergies to medications, adverse drug reactions, diagnosis change, or new procedure performed?: [x] No    [] Yes (see summary sheet for update)  Subjective functional status/changes:     Pt reports 3/10 pain and buckling of the left knee at times. He reports compliance with HEP.     OBJECTIVE/EXAMINATION    Modality rationale: decrease edema, decrease inflammation and decrease pain to improve the patients ability to tolerate ADLs/amb   Min Type Additional Details    [] Estim:  []Unatt       []IFC  []Premod                        []Other:  []w/ice   []w/heat  Position:  Location:    [] Estim: []Att    []TENS instruct  []NMES                    []Other:  []w/US   []w/ice   []w/heat  Position:  Location:    []  Traction: [] Cervical       []Lumbar                       [] Prone          []Supine                       []Intermittent   []Continuous Lbs:  [] before manual  [] after manual    []  Ultrasound: []Continuous   [] Pulsed                           []1MHz   []3MHz Location:  W/cm2:    []  Iontophoresis with dexamethasone         Location: [] Take home patch   [] In clinic    []  Ice     []  heat  []  Ice massage  []  Laser   []  Anodyne Position:  Location:    []  Laser with stim  []  Other: Position:  Location:   - [x]  Vasopneumatic Device Pressure:       [x] lo [] med [] hi   Temperature: [x] lo [] med [] hi   [x] Skin assessment post-treatment:  [x]intact []redness- no adverse reaction    []redness - adverse reaction:     28 min Therapeutic Exercise:  [x]  See flow sheet :     Rationale: increase ROM, increase strength, improve coordination, improve balance and increase proprioception  to improve the patients ability to amb with ease and safety          With   [] TE   [] TA   [] neuro   [] other: Patient Education: [x] Review HEP    [] Progressed/Changed HEP based on:   [] positioning   [] body mechanics   [] transfers   [] heat/ice application    [] other:      Other Objective/Functional Measures:   - Challenged with TM, camilla sidestepping and backwards  - Poor quad control with glute x 3  - Fair return demo with squat form   - Challenged and buckling with eccentric step down     Pain Level (0-10 scale) post treatment: 2/10 \"fatigued\"    ASSESSMENT/Changes in Function:   Initiated therex today per flow sheet, requiring vc and demo 100% of the time for proper form and technique. Good effort and no increase in pain with TE. Limited time due to patient has a 2pm appt for his shoulder with another clinician. Patient will continue to benefit from skilled PT services to modify and progress therapeutic interventions, address functional mobility deficits, address ROM deficits, address strength deficits, analyze and address soft tissue restrictions, analyze and cue movement patterns, analyze and modify body mechanics/ergonomics, assess and modify postural abnormalities, address imbalance/dizziness and instruct in home and community integration to attain remaining goals. [x]  See Plan of Care  []  See progress note/recertification  []  See Discharge Summary          Progress towards goals / Updated goals:  Short term goals: To be accomplished within 1 week  1. Pt will be independent with HEP to maintain progression. MET (6/17/19)     Long term goals: To be accomplished within 8 weeks  1.  Pt will improve FOTO score by 10 points to 57/100 to show improvement with functional mobility performance. 2. Pt will have Left knee AROM improved to 0-115 degs at least to amb household and community with normal and safe pattern. 3. Pt will have B hip abd, ext strength improved to at least 4/5 to be able to amb longer distance and navigate stair safely.   4. Pt will report no more than 1-2/10 pain at worst so he can amb and perform ADLs with ease.     PLAN  [x]  Upgrade activities as tolerated     [x]  Continue plan of care  []  Update interventions per flow sheet       []  Discharge due to:_  []  Other:_      CECELIA Khalil 6/17/2019  2:00 PM

## 2019-06-18 ENCOUNTER — HOSPITAL ENCOUNTER (OUTPATIENT)
Dept: LAB | Age: 64
Discharge: HOME | End: 2019-06-18
Payer: MEDICARE

## 2019-06-18 ENCOUNTER — OFFICE VISIT (OUTPATIENT)
Dept: FAMILY MEDICINE CLINIC | Facility: CLINIC | Age: 64
End: 2019-06-18

## 2019-06-18 VITALS
OXYGEN SATURATION: 97 % | RESPIRATION RATE: 12 BRPM | HEIGHT: 70 IN | HEART RATE: 79 BPM | DIASTOLIC BLOOD PRESSURE: 68 MMHG | WEIGHT: 230 LBS | TEMPERATURE: 98.3 F | BODY MASS INDEX: 32.93 KG/M2 | SYSTOLIC BLOOD PRESSURE: 130 MMHG

## 2019-06-18 DIAGNOSIS — Z86.718 HISTORY OF DVT OF LOWER EXTREMITY: ICD-10-CM

## 2019-06-18 DIAGNOSIS — Z79.01 WARFARIN ANTICOAGULATION: ICD-10-CM

## 2019-06-18 DIAGNOSIS — Z01.818 PRE-OP EVALUATION: Primary | ICD-10-CM

## 2019-06-18 LAB
INR PPP: 1.9 (ref 0.8–1.2)
PROTHROMBIN TIME: 21.6 SEC (ref 11.5–15.2)

## 2019-06-18 PROCEDURE — 85610 PROTHROMBIN TIME: CPT

## 2019-06-18 PROCEDURE — 36415 COLL VENOUS BLD VENIPUNCTURE: CPT

## 2019-06-18 NOTE — PROGRESS NOTES
The patient presents to the office today with the chief complaint of decreased vision and for a pre op evaluation    HPI    Cherie Stephen complains of decreased vision in his right eye secondary to cataract. .  he is now scheduled for surgical correction. Other than his vision the patient has no complaints. The patient denies chest pain or dyspnea. The patient had thrombosis in his legs. The patient had lower extremity edema. This has improved. The patient remains on Coumadin. Patient's protimes have been doing well. ROS  Positive for decreased vision as above. Negative for chest pain or shortness of breath    Allergies   Allergen Reactions    Amoxicillin Itching    Augmentin [Amoxicillin-Pot Clavulanate] Itching    Chlorhexidine Towelette Itching    Hibiclens [Chlorhexidine Gluconate] Itching    Milk Containing Products Diarrhea    Penicillins Rash    Requip [Ropinirole] Nausea and Vomiting       Current Outpatient Medications   Medication Sig Dispense Refill    lansoprazole (PREVACID) 30 mg capsule TAKE 1 CAPSULE DAILY BEFOREBREAKFAST 90 Cap 3    diclofenac (VOLTAREN) 1 % gel Apply 2 g to affected area four (4) times daily. 100 g 2    warfarin (COUMADIN) 5 mg tablet TAKE 2 AND 1/2 TABLETS BY MOUTH DAILY OR AS DIRECTED 75 Tab 0    celecoxib (CELEBREX) 200 mg capsule Take 1 Cap by mouth two (2) times a day for 90 days. (Patient taking differently: Take 200 mg by mouth two (2) times a day. Indications: 2 tabs weekly) 60 Cap 2    montelukast (SINGULAIR) 10 mg tablet TAKE 1 TABLET BY MOUTH EVERY DAY 90 Tab 0    butalbital-acetaminophen-caff (FIORICET) -40 mg per capsule Take 1 Cap by mouth every eight (8) hours as needed for Pain. 90 Cap 1    diclofenac (VOLTAREN) 1 % gel Apply 4 g to affected area four (4) times daily. Maximum 16 grams per joint per day.  Dispense 5 100 gram tubes 5 Each 0    lisinopril-hydroCHLOROthiazide (PRINZIDE, ZESTORETIC) 20-12.5 mg per tablet TAKE 1 TABLET BY MOUTH DAILY 90 Tab 0    celecoxib (CELEBREX) 50 mg capsule Take 50 mg by mouth two (2) times a day. Indications: 2 tabs weekly      labetalol (NORMODYNE) 100 mg tablet TAKE ONE TABLET BY MOUTH TWICE DAILY 180 Tab 0    metaxalone (SKELAXIN) 800 mg tablet Take 1 Tab by mouth three (3) times daily. Indications: muscle spasm 90 Tab 2    raNITIdine (ZANTAC) 150 mg tablet TK 1 T PO HS  1    ALPRAZolam (XANAX) 0.5 mg tablet Take one half(1/2) tab to one(1) tab by mouth at bedtime as needed for sleep 30 Tab 0    warfarin (COUMADIN) 5 mg tablet TAKE 2 AND 1/2 TABLETS BY MOUTH DAILY OR AS DIRECTED 75 Tab 0    warfarin (COUMADIN) 3 mg tablet Or as directed 30 Tab 3    montelukast (SINGULAIR) 10 mg tablet TAKE 1 TABLET BY MOUTH EVERY DAY (Patient taking differently: TAKE 1 TABLET BY MOUTH EVERY HS) 90 Tab 0    acetaminophen (TYLENOL) 500 mg tablet Take 1,000 mg by mouth every six (6) hours as needed for Pain.          Past Medical History:   Diagnosis Date    Arthritis     Bleeding     Chronic pain     knee and shoulder    GERD (gastroesophageal reflux disease)     Headache(784.0)     migraine    High cholesterol     Hypertension     Lower back pain 11/6/2010    Other chest pain     Pure hypercholesterolemia     Right buttock pain 11/6/2010    Right foot pain     Rotator cuff tear     left-since 2010, worsened tear Jan.    Rotator cuff tear, right     Spinal stenosis     Tendonitis, tibialis     anterior    Thromboembolus ([address]     3 after sx last one 2000       Past Surgical History:   Procedure Laterality Date    FOOT/TOES SURGERY PROC UNLISTED      HX BACK SURGERY      HX HEENT Right 09/2018    eye surgery, macular     HX KNEE REPLACEMENT Left     HX ORTHOPAEDIC  06-25-12    Right foot with excision of bursa and adipose tissue from fifth metatarsal base by Dr. Harshil Sahni      lower back (1992 and 2000)    HX OTHER SURGICAL      left foot (2008)    HX OTHER SURGICAL Retina repair     HX PROSTATECTOMY  11/2018    HX ROTATOR CUFF REPAIR Right 01/28/2019    by Dr. Hui Bearden History     Socioeconomic History    Marital status:      Spouse name: Not on file    Number of children: Not on file    Years of education: Not on file    Highest education level: Not on file   Occupational History    Not on file   Social Needs    Financial resource strain: Not on file    Food insecurity:     Worry: Not on file     Inability: Not on file    Transportation needs:     Medical: Not on file     Non-medical: Not on file   Tobacco Use    Smoking status: Never Smoker    Smokeless tobacco: Never Used   Substance and Sexual Activity    Alcohol use: No    Drug use: No    Sexual activity: Not Currently   Lifestyle    Physical activity:     Days per week: Not on file     Minutes per session: Not on file    Stress: Not on file   Relationships    Social connections:     Talks on phone: Not on file     Gets together: Not on file     Attends Nondenominational service: Not on file     Active member of club or organization: Not on file     Attends meetings of clubs or organizations: Not on file     Relationship status: Not on file    Intimate partner violence:     Fear of current or ex partner: Not on file     Emotionally abused: Not on file     Physically abused: Not on file     Forced sexual activity: Not on file   Other Topics Concern     Service Not Asked    Blood Transfusions Not Asked    Caffeine Concern Not Asked    Occupational Exposure Not Asked   Hettie Model Hazards Not Asked    Sleep Concern Not Asked    Stress Concern Not Asked    Weight Concern Not Asked    Special Diet Not Asked    Back Care Not Asked    Exercise Not Asked    Bike Helmet Not Asked   [address] Not Asked    Self-Exams Not Asked   Social History Narrative    Not on file       Patient does not have an advanced directive on file    Visit Vitals  BP 130/68   Pulse 79   Temp 98.3 °F (36.8 °C) (Oral)   Resp 12   Ht 5' 10\" (1.778 m)   Wt 230 lb (104.3 kg)   SpO2 97%   BMI 33.00 kg/m²       Physical Exam  No Cervical Lymphadenopathy  No Supraclavicular Lymphadenopathy  Thyroid is Normal  Lungs are normal to percussion. Clear to auscultation   Heart:  S1 S2 are normal, No gallops, No murmurs  No Carotid Bruits  Abdomen:  Normal Bowel Sounds. No tenderness. No masses. No Hepatomegaly or Splenomegaly  LE:  Strong Pedal Pulses. No Edema        Hospital Outpatient Visit on 06/18/2019   Component Date Value Ref Range Status    Prothrombin time 06/18/2019 21.6* 11.5 - 15.2 sec Final    INR 06/18/2019 1.9* 0.8 - 1.2   Final    Comment:            INR Therapeutic Ranges         (on stable oral anticoagulant):     INDICATION                INR  DVT/PE/Atrial Fib          2.0-3.0  MI/Mechanical Heart Valve  2.5-3.5     Hospital Outpatient Visit on 06/06/2019   Component Date Value Ref Range Status    WBC 06/06/2019 5.0  4.6 - 13.2 K/uL Final    RBC 06/06/2019 4.15* 4.70 - 5.50 M/uL Final    HGB 06/06/2019 12.6* 13.0 - 16.0 g/dL Final    HCT 06/06/2019 38.7  36.0 - 48.0 % Final    MCV 06/06/2019 93.3  74.0 - 97.0 FL Final    MCH 06/06/2019 30.4  24.0 - 34.0 PG Final    MCHC 06/06/2019 32.6  31.0 - 37.0 g/dL Final    RDW 06/06/2019 13.8  11.6 - 14.5 % Final    PLATELET 66/48/6449 065  135 - 420 K/uL Final    MPV 06/06/2019 11.1  9.2 - 11.8 FL Final    CRP, High sensitivity 06/06/2019 >9.5  mg/L Final    Comment: (NOTE)   Value                          Risk    <1.0 mg/L                        Low  1.0-3.0 mg/L                     Average  >3.0 mg/L                        High    CRP is a nonspecific marker of inflammation and conditions other than   atherosclerosis may cause elevated levels. If first result >3.0 mg/L,   consider repeating at least 2 weeks later with patient in a   metabolically stable state, free of infection or acute illness.       Sed rate, automated 06/06/2019 18  0 - 20 mm/hr Final    Interleukin-6 (IL-6) 06/06/2019 3.3  0.0 - 15.5 pg/mL Final    Comment: (NOTE)  Results for this test are for research purposes only by the assay's  . The performance characteristics of this product have  not been established. Results should not be used as a diagnostic  procedure without confirmation of the diagnosis by another  medically established diagnostic product or procedure. Performed At: [address] [id number]   ksenia Hay [address] [id number]     Hospital Outpatient Visit on 05/03/2019   Component Date Value Ref Range Status    WBC 05/03/2019 7.2  4.6 - 13.2 K/uL Final    RBC 05/03/2019 4.26* 4.70 - 5.50 M/uL Final    HGB 05/03/2019 13.1  13.0 - 16.0 g/dL Final    HCT 05/03/2019 40.8  36.0 - 48.0 % Final    MCV 05/03/2019 95.8  74.0 - 97.0 FL Final    MCH 05/03/2019 30.8  24.0 - 34.0 PG Final    MCHC 05/03/2019 32.1  31.0 - 37.0 g/dL Final    RDW 05/03/2019 14.1  11.6 - 14.5 % Final    PLATELET 99/40/8603 859  135 - 420 K/uL Final    MPV 05/03/2019 11.5  9.2 - 11.8 FL Final    NEUTROPHILS 05/03/2019 66  40 - 73 % Final    LYMPHOCYTES 05/03/2019 20* 21 - 52 % Final    MONOCYTES 05/03/2019 12* 3 - 10 % Final    EOSINOPHILS 05/03/2019 2  0 - 5 % Final    BASOPHILS 05/03/2019 0  0 - 2 % Final    ABS. NEUTROPHILS 05/03/2019 4.8  1.8 - 8.0 K/UL Final    ABS. LYMPHOCYTES 05/03/2019 1.4  0.9 - 3.6 K/UL Final    ABS. MONOCYTES 05/03/2019 0.9  0.05 - 1.2 K/UL Final    ABS. EOSINOPHILS 05/03/2019 0.1  0.0 - 0.4 K/UL Final    ABS.  BASOPHILS 05/03/2019 0.0  0.0 - 0.1 K/UL Final    DF 05/03/2019 AUTOMATED    Final    Sodium 05/03/2019 141  136 - 145 mmol/L Final    Potassium 05/03/2019 3.8  3.5 - 5.5 mmol/L Final    Chloride 05/03/2019 107  100 - 108 mmol/L Final    CO2 05/03/2019 28  21 - 32 mmol/L Final    Anion gap 05/03/2019 6  3.0 - 18 mmol/L Final    Glucose 05/03/2019 95  74 - 99 mg/dL Final    BUN 05/03/2019 19* 7.0 - 18 MG/DL Final    Creatinine 05/03/2019 1.06  0.6 - 1.3 MG/DL Final    BUN/Creatinine ratio 05/03/2019 18  12 - 20   Final    GFR est AA 05/03/2019 >60  >60 ml/min/1.73m2 Final    GFR est non-AA 05/03/2019 >60  >60 ml/min/1.73m2 Final    Comment: (NOTE)  Estimated GFR is calculated using the Modification of Diet in Renal   Disease (MDRD) Study equation, reported for both  Americans   (GFRAA) and non- Americans (GFRNA), and normalized to 1.73m2   body surface area. The physician must decide which value applies to   the patient. The MDRD study equation should only be used in   individuals age 25 or older. It has not been validated for the   following: pregnant women, patients with serious comorbid conditions,   or on certain medications, or persons with extremes of body size,   muscle mass, or nutritional status.  Calcium 05/03/2019 9.0  8.5 - 10.1 MG/DL Final    Bilirubin, total 05/03/2019 0.4  0.2 - 1.0 MG/DL Final    ALT (SGPT) 05/03/2019 27  16 - 61 U/L Final    AST (SGOT) 05/03/2019 22  15 - 37 U/L Final    Alk. phosphatase 05/03/2019 73  45 - 117 U/L Final    Protein, total 05/03/2019 6.6  6.4 - 8.2 g/dL Final    Albumin 05/03/2019 4.0  3.4 - 5.0 g/dL Final    Globulin 05/03/2019 2.6  2.0 - 4.0 g/dL Final    A-G Ratio 05/03/2019 1.5  0.8 - 1.7   Final    LIPID PROFILE 05/03/2019        Final    Cholesterol, total 05/03/2019 218* <200 MG/DL Final    Triglyceride 05/03/2019 154* <150 MG/DL Final    Comment: The drugs N-acetylcysteine (NAC) and  Metamiszole have been found to cause falsely  low results in this chemical assay. Please  be sure to submit blood samples obtained  BEFORE administration of either of these  drugs to assure correct results.       HDL Cholesterol 05/03/2019 53  40 - 60 MG/DL Final    LDL, calculated 05/03/2019 134.2* 0 - 100 MG/DL Final    VLDL, calculated 05/03/2019 30.8  MG/DL Final    CHOL/HDL Ratio 05/03/2019 4.1  0 - 5.0   Final    Prostate Specific Ag 05/03/2019 0.0  0.0 - 4.0 ng/mL Final   Office Visit on 05/03/2019   Component Date Value Ref Range Status    VALID INTERNAL CONTROL POC 05/03/2019 Yes   Final    INR POC 05/03/2019 2.1   Final   Hospital Outpatient Visit on 04/25/2019   Component Date Value Ref Range Status    LA Volume 04/25/2019 44.72  18 - 58 mL Final    LV E' Lateral Velocity 04/25/2019 7.40  cm/s Final    LV E' Septal Velocity 04/25/2019 8.29  cm/s Final    Tapse 04/25/2019 2.50* 1.5 - 2.0 cm Final    Ao Root D 04/25/2019 3.31  cm Final    LVIDd 04/25/2019 4.70  4.2 - 5.9 cm Final    LVPWd 04/25/2019 0.95  0.6 - 1.0 cm Final    LVIDs 04/25/2019 3.35  cm Final    IVSd 04/25/2019 0.97  0.6 - 1.0 cm Final    LVOT d 04/25/2019 2.30  cm Final    LVOT Peak Velocity 04/25/2019 99.69  cm/s Final    LVOT Peak Gradient 04/25/2019 4.0  mmHg Final    LVOT VTI 04/25/2019 22.88  cm Final    MV A Jerrell 04/25/2019 89.29  cm/s Final    MV E Jerrell 04/25/2019 69.79  cm/s Final    MV E/A 04/25/2019 0.78   Final    Global Longitudinal Strain 04/25/2019 20.60  % Final    LA Vol 4C 04/25/2019 38.43  18 - 58 mL Final    LA Vol 2C 04/25/2019 44.24  18 - 58 mL Final    LA Area 4C 04/25/2019 15.6  cm2 Final    LV Mass AL 04/25/2019 179.6  88 - 224 g Final    LV Mass AL Index 04/25/2019 81.1  49 - 115 g/m2 Final    E/E' lateral 04/25/2019 9.43   Final    E/E' septal 04/25/2019 8.42   Final    E/E' ratio (averaged) 04/25/2019 8.92   Final    Mitral Valve E Wave Deceleration T* 04/25/2019 196.8  ms Final    Triscuspid Valve Regurgitation Pea* 04/25/2019 21.5  mmHg Final    TR Max Velocity 04/25/2019 232.09  cm/s Final    LA Vol Index 04/25/2019 20.19  16 - 28 ml/m2 Final    PASP 04/25/2019 25.0  mmHg Final    LA Vol Index 04/25/2019 19.97  16 - 28 ml/m2 Final    LA Vol Index 04/25/2019 17.35  16 - 28 ml/m2 Final   Clinical Support on 04/11/2019   Component Date Value Ref Range Status    VALID INTERNAL CONTROL POC 04/11/2019 Yes   Final    INR POC 04/11/2019 1.7   Final   Clinical Support on 03/25/2019   Component Date Value Ref Range Status    VALID INTERNAL CONTROL POC 03/25/2019 Yes   Final    INR POC 03/25/2019 1.7   Final   Office Visit on 03/22/2019   Component Date Value Ref Range Status    Prostate Specific Ag 03/22/2019 <0.03   0 - 4 ng/mL Final    Comment: (Methodology: Roche ECLIA)          Glucose (UA POC) 03/22/2019 Negative  Negative Final    Bilirubin (UA POC) 03/22/2019 Negative  Negative Final    Ketones (UA POC) 03/22/2019 Negative  Negative Final    Specific gravity (UA POC) 03/22/2019 1.015  1.001 - 1.035 Final    Blood (UA POC) 03/22/2019 Trace  Negative Final    pH (UA POC) 03/22/2019 6.0  4.6 - 8.0 Final    Protein (UA POC) 03/22/2019 Negative  Negative Final    Urobilinogen (UA POC) 03/22/2019 0.2 mg/dL  0.2 - 1 Final    Nitrites (UA POC) 03/22/2019 Negative  Negative Final    Leukocyte esterase (UA POC) 03/22/2019 Negative  Negative Final       .  Results for orders placed or performed during the hospital encounter of 06/18/19   PROTHROMBIN TIME + INR   Result Value Ref Range    Prothrombin time 21.6 (H) 11.5 - 15.2 sec    INR 1.9 (H) 0.8 - 1.2           Assessment / Plan      ICD-10-CM ICD-9-CM    1. Pre-op evaluation Z01.818 V72.84    2. Warfarin anticoagulation Z79.01 V58.61 PROTHROMBIN TIME + INR   3. History of DVT of lower extremity Z86.718 V12.51 FACTOR V LEIDEN      PROTHROMBIN TIME + INR       THE PATIENT IS OK FOR SURGERY    he was advised to continue his maintenance medications      Follow-up and Dispositions    · Return in about 3 months (around 9/18/2019). I asked Kain Andre if he has any questions and I answered the questions. Kain Andre states that he understands the treatment plan and agrees with the treatment plan      THIS DOCUMENT WAS MADE USING A [address] [employer].   TRANSCRIPTION ERRORS ARE POSSIBLE

## 2019-06-18 NOTE — LETTER
19 RE:  Buck Lainez :  1955 To whom it may concern: This is the current list of medications for Buck Lainez. Current Outpatient Medications Medication Sig Dispense Refill  lansoprazole (PREVACID) 30 mg capsule TAKE 1 CAPSULE DAILY BEFOREBREAKFAST 90 Cap 3  
 diclofenac (VOLTAREN) 1 % gel Apply 2 g to affected area four (4) times daily. 100 g 2  warfarin (COUMADIN) 5 mg tablet TAKE 2 AND 1/2 TABLETS BY MOUTH DAILY OR AS DIRECTED 75 Tab 0  
 celecoxib (CELEBREX) 200 mg capsule Take 1 Cap by mouth two (2) times a day for 90 days. (Patient taking differently: Take 200 mg by mouth two (2) times a day. Indications: 2 tabs weekly) 60 Cap 2  
 montelukast (SINGULAIR) 10 mg tablet TAKE 1 TABLET BY MOUTH EVERY DAY 90 Tab 0  
 butalbital-acetaminophen-caff (FIORICET) -40 mg per capsule Take 1 Cap by mouth every eight (8) hours as needed for Pain. 90 Cap 1  diclofenac (VOLTAREN) 1 % gel Apply 4 g to affected area four (4) times daily. Maximum 16 grams per joint per day. Dispense 5 100 gram tubes 5 Each 0  
 lisinopril-hydroCHLOROthiazide (PRINZIDE, ZESTORETIC) 20-12.5 mg per tablet TAKE 1 TABLET BY MOUTH DAILY 90 Tab 0  
 celecoxib (CELEBREX) 50 mg capsule Take 50 mg by mouth two (2) times a day. Indications: 2 tabs weekly  labetalol (NORMODYNE) 100 mg tablet TAKE ONE TABLET BY MOUTH TWICE DAILY 180 Tab 0  
 metaxalone (SKELAXIN) 800 mg tablet Take 1 Tab by mouth three (3) times daily.  Indications: muscle spasm 90 Tab 2  
 raNITIdine (ZANTAC) 150 mg tablet TK 1 T PO HS  1  
 ALPRAZolam (XANAX) 0.5 mg tablet Take one half(1/2) tab to one(1) tab by mouth at bedtime as needed for sleep 30 Tab 0  
 warfarin (COUMADIN) 5 mg tablet TAKE 2 AND 1/2 TABLETS BY MOUTH DAILY OR AS DIRECTED 75 Tab 0  
 warfarin (COUMADIN) 3 mg tablet Or as directed 30 Tab 3  
 montelukast (SINGULAIR) 10 mg tablet TAKE 1 TABLET BY MOUTH EVERY DAY (Patient taking differently: TAKE 1 TABLET BY MOUTH EVERY HS) 90 Tab 0  
 acetaminophen (TYLENOL) 500 mg tablet Take 1,000 mg by mouth every six (6) hours as needed for Pain. Please contact us if you have any questions 
  
 
                                                                             ____________________________

## 2019-06-18 NOTE — PROGRESS NOTES
ROOM # 6    Candice Pan presents today for   Chief Complaint   Patient presents with    Pre-op Exam     right eye cataract       Candice Pan preferred language for health care discussion is english/other. Is someone accompanying this pt? Yes his wife    Is the patient using any DME equipment during [address]? no    Depression Screening:  3 most recent [facility] Screens 6/18/2019 6/7/2019 6/6/2019 6/4/2019 5/21/2019 5/7/2019 5/3/2019   PHQ Not Done - - - - - - -   Little interest or pleasure in doing things Not at all Not at all Not at all Not at all Not at all Not at all Not at all   Feeling down, depressed, irritable, or hopeless Not at all Not at all Not at all Not at all Not at all Not at all Not at all   Total Score PHQ 2 0 0 0 0 0 0 0       Learning Assessment:  Learning Assessment 2/8/2019 11/6/2018 9/25/2015   PRIMARY LEARNER Patient Patient Patient   HIGHEST LEVEL OF EDUCATION - PRIMARY LEARNER  - GRADUATED HIGH SCHOOL OR GED GRADUATED [address] PRIMARY LEARNER - [address] [facility] CAREGIVER - No No   PRIMARY [address] [facility]    NEED - - No   LEARNER PREFERENCE PRIMARY DEMONSTRATION DEMONSTRATION DEMONSTRATION   ANSWERED BY Patient patient patient   RELATIONSHIP SELF SELF SELF       Abuse Screening:  Abuse Screening Questionnaire 11/6/2018 7/3/2018 2/15/2016   Do you ever feel afraid of your partner? N N N   Are you in a relationship with someone who physically or mentally threatens you? N N N   Is it safe for you to go home? Jony Fritz       Fall Risk  Fall Risk Assessment, last 12 mths 6/18/2019 6/7/2019 6/6/2019 5/21/2019 5/7/2019 5/3/2019 4/26/2019   Able to walk? Yes Yes Yes Yes Yes Yes Yes   Fall in past 12 months? Yes Yes Yes No Yes No No   Fall with injury? Yes Yes No - Yes - -   Number of falls in past 12 months 1 1 1 - 1 - -   Fall Risk Score 2 2 1 - 2 - -       Health Maintenance reviewed and discussed per provider.  Yes    Candice Pan is due for Health Maintenance Due   Topic Date Due    Hepatitis C Screening  1955    Shingrix Vaccine Age 50> (1 of 2) 04/13/2003    GLAUCOMA SCREENING Q2Y  04/13/2018    Pneumococcal 65+ years (1 of 2 - PCV13) 04/13/2018         Please order/place referral if appropriate. Advance Directive:  1. Do you have an advance directive in place? Patient Reply: no    2. If not, would you like material regarding how to put one in place? Patient Reply: no    Coordination of Care:  1. Have you been to the ER, urgent care clinic since your last visit? Hospitalized since your last visit? no    2. Have you seen or consulted any other health care providers outside of the [address] [place] [address] since your last visit? Include any pap smears or colon screening.  no

## 2019-06-19 ENCOUNTER — APPOINTMENT (OUTPATIENT)
Dept: PHYSICAL THERAPY | Age: 64
End: 2019-06-19
Payer: MEDICARE

## 2019-06-21 ENCOUNTER — HOSPITAL ENCOUNTER (OUTPATIENT)
Dept: PHYSICAL THERAPY | Age: 64
Discharge: HOME | End: 2019-06-21
Payer: MEDICARE

## 2019-06-21 PROCEDURE — 97530 THERAPEUTIC ACTIVITIES: CPT

## 2019-06-21 PROCEDURE — 97110 THERAPEUTIC EXERCISES: CPT

## 2019-06-21 PROCEDURE — 97018 PARAFFIN BATH THERAPY: CPT

## 2019-06-21 PROCEDURE — 97140 MANUAL THERAPY 1/> REGIONS: CPT

## 2019-06-21 NOTE — PROGRESS NOTES
PT DAILY TREATMENT NOTE 10-18    Patient Name: Rehana Purdy  Date:2019  : 1955  [x]  Patient  Verified  Payor: VA MEDICARE / Plan: VA MEDICARE PART A & B / Product Type: Medicare /    In time: 2:29  Out time: 3:00  Total Treatment Time (min): 31  Visit #: 3 of     Medicare/BCBS Only   Total Timed Codes (min):  31 1:1 Treatment Time:         Treatment Area: Presence of left artificial knee joint [Z96.652]  Iliotibial band syndrome, left leg [M76.32]    SUBJECTIVE  Pain Level (0-10 scale):  3/10  Any medication changes, allergies to medications, adverse drug reactions, diagnosis change, or new procedure performed?: [x] No    [] Yes (see summary sheet for update)  Subjective functional status/changes:   [] No changes reported  Pt. Reports he is doing pretty good but his    OBJECTIVE    23 min Therapeutic Exercise:  [x] See flow sheet :   Rationale: increase ROM and increase strength to improve the patients ability to increase ease of ADLs    8 min Manual Therapy:  Trigger point release to vastus lateralis   Rationale: decrease pain, increase ROM and increase tissue extensibility to increase ease of ADLs          With   [x] TE   [] TA   [] neuro   [] other: Patient Education: [x] Review HEP    [] Progressed/Changed HEP based on:   [] positioning   [] body mechanics   [] transfers   [] heat/ice application    [] other:      Other Objective/Functional Measures:   Left knee AROM: 106 degrees. Improved to 114 degrees following manual  He was challenged with 30 reps of clamshells  Unable to perform SL bridge today     Pain Level (0-10 scale) post treatment:  3/10    ASSESSMENT/Changes in Function:  Pt. Is progressing slowly towards goals. He continues to have decreased left knee mobility and decreased strength. He also continues to have tightness and trigger points along vastus lateralis that is affecting his mobility.      Patient will continue to benefit from skilled PT services to modify and progress therapeutic interventions, address functional mobility deficits, address ROM deficits, address strength deficits, analyze and address soft tissue restrictions, analyze and cue movement patterns, analyze and modify body mechanics/ergonomics and assess and modify postural abnormalities to attain remaining goals. Progress towards goals / Updated goals:  Short term goals: To be accomplished within 1 week  1. Pt will be independent with HEP to maintain progression. MET (6/17/19)     Long term goals: To be accomplished within 8 weeks  1. Pt will improve FOTO score by 10 points to 57/100 to show improvement with functional mobility performance. 2. Pt will have Left knee AROM improved to 0-115 degs at least to amb household and community with normal and safe pattern. Not met: 106 degrees (6/21/19)  3. Pt will have B hip abd, ext strength improved to at least 4/5 to be able to amb longer distance and navigate stair safely.   4. Pt will report no more than 1-2/10 pain at worst so he can amb and perform ADLs with ease.     PLAN  []  Upgrade activities as tolerated     [x]  Continue plan of care  []  Update interventions per flow sheet       []  Discharge due to:_  []  Other:_      Tawana Cheatham, PT 6/21/2019  2:17 PM    Future Appointments   Date Time Provider Dex Alysia   6/21/2019  2:30 PM Claudia Samaniego PT [facility]   6/21/2019  3:00 PM Benjamin Elizabeth PTA [facility]   6/21/2019  3:30 PM Madyson HOUSE [facility]   7/1/2019 12:30 PM Herminio Lucero PTA [facility]   7/1/2019  1:00 PM Benjamin Elizabeth PTA [facility]   7/1/2019  1:45 PM TEODORO Gonsales/L [facility]   7/2/2019  9:20 AM [facility]legria [facility]   7/3/2019 11:45 AM Madyson MERCADO [facility]   7/3/2019 12:30 PM Breanna Bella, PT [facility]   7/3/2019  1:00 PM Breanna Bella PT [facility]   7/5/2019  9:30 AM Evy Vázquez, [address] [facility]   7/19/2019 10:30 AM Rosi Ross R, ESPERANZA [address]   7/22/2019  8:30 AM Jaquelin Phan  [address]   7/22/2019  2:40 PM [address] [facility]   9/11/2019 11:30 AM Jonelle Boyle MD [facility]   10/2/2019  1:00 PM Bere Chinchilla MD [address] [facility]

## 2019-06-21 NOTE — PROGRESS NOTES
OT DAILY TREATMENT NOTE 10-18    Patient Name: Roma Ortega  Date:2019  : 1955  [x]  Patient  Verified  Payor: Rhea Jimenez / Plan: VA MEDICARE PART A & B / Product Type: Medicare /    In time:330  Out time:420  Total Treatment Time (min): 50  Visit #: 5 of 16    Medicare/BCBS Only   Total Timed Codes (min):  38 1:1 Treatment Time:  50     Treatment Area: Pain in right hand [M79.641]  Pain in left hand [M79.642]    SUBJECTIVE  Pain Level (0-10 scale): 3/10  Any medication changes, allergies to medications, adverse drug reactions, diagnosis change, or new procedure performed?: [x] No    [] Yes (see summary sheet for update)  Subjective functional status/changes:   [] No changes reported  I can't believe how good my hands feel     OBJECTIVE             Modality rationale: decrease inflammation, decrease pain and increase tissue extensibility to improve the patients ability to grasp   Min Type Additional Details     [] Estim:  []Unatt       []IFC  []Premod                        []Other:  []w/ice   []w/heat  Position:  Location:     [] Estim: []Att    []TENS instruct  []NMES                    []Other:  []w/US   []w/ice   []w/heat  Position:  Location:     []  Traction: [] Cervical       []Lumbar                       [] Prone          []Supine                       []Intermittent   []Continuous Lbs:  [] before manual  [] after manual     []  Ultrasound: []Continuous   [] Pulsed                           []1MHz   []3MHz Location:  W/cm2:     []  Iontophoresis with dexamethasone         Location: [] Take home patch   [] In clinic     []  Ice     []  heat  []  Ice massage  []  Laser   []  Anodyne Position:  Location:   10 []  Laser with stim  [x]  Other: Paraffin  Position:  Location: B Hands     []  Vasopneumatic Device Pressure:       [] lo [] med [] hi   Temperature: [] lo [] med [] hi   [x] Skin assessment post-treatment:  [x]intact []redness- no adverse reaction  []redness - adverse reaction: 13 min Therapeutic Exercise:  [] See flow sheet :   Rationale: increase ROM and increase strength to improve the patients ability to     Medium Putty: Manipulated with Each hand to reveal/remove 1/4 in pegs 10/10  Review HEP    25 min Therapeutic Activity:  []  See flow sheet :   Rationale: increase ROM and adaptive strategies   to improve the patients ability to functionally use hands    Education provided on use of foam cylindrical tubing, dycem, and rocker knife with Pt demonstration of use  Pt verbally educated on joint protection strategies to assist in daily life            With   [] TE   [] TA   [] neuro   [] other: Patient Education: [x] Review HEP    [] Progressed/Changed HEP based on:   [] positioning   [] body mechanics   [] transfers   [] heat/ice application   [] Splint wear/care   [] Sensory re-education   [] scar management      [] other:            Other Objective/Functional Measures:     Pt able to demonstrate use of adaptive equipment and stategies      Pain Level (0-10 scale) post treatment: 0/10    ASSESSMENT/Changes in Function: decrease in pain with modalities and exercise , check carryover for adaptive strategies     Patient will continue to benefit from skilled OT services to modify and progress therapeutic interventions, address ROM deficits, address strength deficits and instruct in home and community integration to attain remaining goals. []  See Plan of Care  []  See progress note/recertification  []  See Discharge Summary         Progress towards goals / Updated goals:  Short Term Goals: To be accomplished in 2 weeks:  1.  Patient will report reduced pain in both hands with use of modalities in clinic. Met consistently with Paraffin use 6/17/19  2.  Patient will be independent in home program to increase thumb abduciton in right and both digit flexion.  Progressing with in clinic activities 6/12/19  3.  Patient will be introduced to joint protection strategies and adaptive equipment to improve self and home care and reduce pain. Progressing with in clinic activities 6/17/19  4.  Patient will be independent in home program for hand strengthening.  Progressing with in clinic tasks 6/21/19     Long Term Goals: To be accomplished in 8 weeks:              1.  Patient will report pain diminished to 0-2/10 with routine use   2.  Patient will increase  strength in both hand by 10 pounds to increase ease of opening jars  3.  Patient will incorporate adaptive equipment and strategies to improve independence in self care tasks such as buttoning, cutting, writing etc. Pt education provided on this date with demonstration 6/21/19  4.  Patient will report improved performance in lifting and carrying tasks as shown by improved FOTO of at least points           PLAN  []  Upgrade activities as tolerated     [x]  Continue plan of care  []  Update interventions per flow sheet       []  Discharge due to:_  []  Other:_      ANDREA Salas 6/21/2019  12:46 PM    Future Appointments   Date Time Provider Dex [place]   6/21/2019  2:30 PM Cindy Burgess PT [facility]   6/21/2019  3:00 PM Nabila Machado PTA [facility]   6/21/2019  3:30 PM Wilber BURNETTGBLSUMIT [facility]   7/1/2019 12:30 PM Oneal Ramirez PTA [facility]   7/1/2019  1:00 PM Nabila Machado PTA [facility]   7/1/2019  1:45 PM TEODORO Jimenez/L [facility]   7/2/2019  9:20 AM Opal Barroso MD [facility]   7/3/2019 11:45 AM Wilber Strauss JBWDMFT [facility]   7/3/2019 12:30 PM Filippo Rainey PT [facility]   7/3/2019  1:00 PM Filippo Rainey PT [facility]   7/5/2019  9:30 AM Thais Pabon, [address] [facility]   7/19/2019 10:30 AM Xiao Cabrera PA-C [facility]   7/22/2019  8:30 AM Jennifer Reddy  [address]   7/22/2019  2:40 PM [facility] NURSE [facility] ROXANNE Jones   9/11/2019 11:30 AM MD CARLY BrandtMO [place] [facility]   10/2/2019  1:00 PM Carlo Chinchilla, MD [place]

## 2019-06-21 NOTE — PROGRESS NOTES
PT DAILY TREATMENT NOTE 10-18    Patient Name: Elisha Leung  Date:2019  : 1955  [x]  Patient  Verified  Payor: VA MEDICARE / Plan: VA MEDICARE PART A & B / Product Type: Medicare /    In time:300  Out time:330  Total Treatment Time (min): 30  Visit #: 2 of     Medicare/BCBS Only   Total Timed Codes (min):  30 1:1 Treatment Time:  30       Treatment Area: Pain in left knee [M25.562]  Pain in right shoulder [M25.511]    SUBJECTIVE  Pain Level (0-10 scale): 2/10  Any medication changes, allergies to medications, adverse drug reactions, diagnosis change, or new procedure performed?: [x] No    [] Yes (see summary sheet for update)  Subjective functional status/changes:   [] No changes reported  Pt stated that his sh is much better    OBJECTIVE    22 min Therapeutic Exercise:  [x] See flow sheet :   Rationale: increase ROM and increase strength to improve the patients ability to increase ease with ADLs    8 min Manual Therapy:  PROM, scap and GH mobs   Rationale: decrease pain, increase ROM and increase tissue extensibility to increase ease with ADLs    With   [x] TE   [] TA   [] neuro   [] other: Patient Education: [x] Review HEP    [] Progressed/Changed HEP based on:   [] positioning   [] body mechanics   [] transfers   [] heat/ice application    [] other:      Other Objective/Functional Measures:   Had no difficulty with exercises  Wall wipes with 2# cont to be challenging  IR is improving    Pain Level (0-10 scale) post treatment: 0-1/10    ASSESSMENT/Changes in Function:   Pt is progressing slowly toward goals. Pt cont with decreased strength and range of motion in right sh, but is improving. Has good scapular mobility.  Cont to have difficulty with sleeping in the bed and performing some ADLs    Patient will continue to benefit from skilled PT services to modify and progress therapeutic interventions, address functional mobility deficits, address ROM deficits and address strength deficits to attain remaining goals. []  See Plan of Care  [x]  See progress note/recertification  []  See Discharge Summary         Progress towards goals / Updated goals:  1. Patient will increase right shoulder strength to 4/5 to improve ease of daily tasks. 2. Patient will increase functional IR ROM to level of L4 to improve ease of dressing. 3. Patient will report little difficulty reaching self overhead to restore ease of ADL's. Progressing.  6/17/19    PLAN  []  Upgrade activities as tolerated     [x]  Continue plan of care  []  Update interventions per flow sheet       []  Discharge due to:_  []  Other:_      Matias Geiger PTA 6/21/2019  2:49 PM    Future Appointments   Date Time Provider Dex [place]   6/21/2019  3:00 PM Andres Cobos PTA [facility]   6/21/2019  3:30 PM Viktoria Burton ZIHNMQS [facility]   7/1/2019 12:30 PM Annamaria Coley PTA [facility]   7/1/2019  1:00 PM Andres Cobos PTA [facility]   7/1/2019  1:45 PM Merton Kanner, OTR/L [facility]   7/2/2019  9:20 AM Corby Magallanes MD [facility] ROXANNE SCHED   7/3/2019 11:45 AM Viktoria Burton GKSVFBH [facility]   7/3/2019 12:30 PM Mago Lenz, PT [facility]   7/3/2019  1:00 PM Mago Lenz PT [facility]   7/5/2019  9:30 AM Brody Georges, [address] [facility]   7/19/2019 10:30 AM Cyndy Palacios PA-C [facility] ROXANNE SCHED   7/22/2019  8:30 AM Damian Arguello  [address]   7/22/2019  2:40 PM [facility] NURSE [facility] ROXANNE SCHED   9/11/2019 11:30 AM MD CARLY Estes-REBECCA OLIVEIRA SCHED   10/2/2019  1:00 PM Given, Uzma Gillespie MD [address] [facility]

## 2019-06-24 ENCOUNTER — APPOINTMENT (OUTPATIENT)
Dept: PHYSICAL THERAPY | Age: 64
End: 2019-06-24
Payer: MEDICARE

## 2019-06-24 LAB — F5 GENE MUT ANL BLD/T: ABNORMAL

## 2019-06-27 ENCOUNTER — HOSPITAL ENCOUNTER (OUTPATIENT)
Dept: PHYSICAL THERAPY | Age: 64
Discharge: HOME | End: 2019-06-27
Payer: MEDICARE

## 2019-06-27 ENCOUNTER — APPOINTMENT (OUTPATIENT)
Dept: PHYSICAL THERAPY | Age: 64
End: 2019-06-27
Payer: MEDICARE

## 2019-06-27 PROCEDURE — 97110 THERAPEUTIC EXERCISES: CPT

## 2019-06-27 NOTE — PROGRESS NOTES
PT DAILY TREATMENT NOTE 10-18    Patient Name: Venus Steward  Date:2019  : 1955  [x]  Patient  Verified  Payor: VA MEDICARE / Plan: VA MEDICARE PART A & B / Product Type: Medicare /    In time: 11:30 Out time: 12:00  Total Treatment Time (min): 30  Visit #: 3 of     Medicare/BCBS Only   Total Timed Codes (min):  30 1:1 Treatment Time:  25       Treatment Area: Presence of left artificial knee joint [Z96.652]  Iliotibial band syndrome, left leg [M76.32]    SUBJECTIVE  Pain Level (0-10 scale): 3/10  Any medication changes, allergies to medications, adverse drug reactions, diagnosis change, or new procedure performed?: [x] No    [] Yes (see summary sheet for update)  Subjective functional status/changes:   [] No changes reported  Pt reports he was told he had ITB tendinitis on the left. He gets pain standing up from a sitting down position but not so much when walking. He  Has been working on all 18 exercises but it takes him an hour to get through everything which is a lot with all his other appts. OBJECTIVE    30 min Therapeutic Exercise:  [x] See flow sheet :   Rationale: increase ROM and increase strength to improve the patients ability to increase ease with ADLs    With   [x] TE   [] TA   [] neuro   [] other: Patient Education: [x] Review HEP    [] Progressed/Changed HEP based on:   [] positioning   [] body mechanics   [] transfers   [] heat/ice application    [] other:      Other Objective/Functional Measures:   Ice strapped to left knee while pt stick rolled ITB  Educated on TFL TPR  Instructed in ice massage as well  Educated on using BTB around knees for better glute engagement with sit to stands and less pain  Educated on correct form for clams and hip abduction    Pain Level (0-10 scale) post treatment: 2/10    ASSESSMENT/Changes in Function: Pt continues to have decreased glute strength causing TFL/ITB compensation and tightness.  He has decreased pain post rolling lateral thigh and icing the knee. Will work on strengthening for better stability and less pain to improve ease of transfers. Progress towards goals / Updated goals:  Short term goals: To be accomplished within 1 week  1. Pt will be independent with HEP to maintain progression.  MET (6/17/19)   Long term goals: To be accomplished within 8 weeks  1. Pt will improve FOTO score by 10 points to 57/100 to show improvement with functional mobility performance. 2. Pt will have Left knee AROM improved to 0-115 degs at least to amb household and community with normal and safe pattern. Not met: 106 degrees (6/21/19)  3. Pt will have B hip abd, ext strength improved to at least 4/5 to be able to amb longer distance and navigate stair safely.   4. Pt will report no more than 1-2/10 pain at worst so he can amb and perform ADLs with ease.     PLAN  [x]  Upgrade activities as tolerated     [x]  Continue plan of care  []  Update interventions per flow sheet       []  Discharge due to:_  []  Other:_      Mitali Coleman PTA 6/27/2019  2:49 PM    Future Appointments   Date Time Provider Dex [place]   7/1/2019 12:30 PM Olivia Anaya PTA [facility]   7/1/2019  1:00 PM Jackie Beauchamp PTA [facility]   7/1/2019  1:45 PM Tasneme Encinas OTR/L [facility]   7/2/2019  9:20 AM Katie Newsome MD [facility] ROXANNE SCHED   7/3/2019 11:45 AM Maya DANIELKVERONICA [facility]   7/3/2019 12:30 PM Josh Mcgarry, PT [facility]   7/3/2019  1:00 PM Dawson Stapleton [facility]   7/5/2019  9:30 AM Zoila Callejas MD [facility] ROXANNE SCHED   7/9/2019  1:00 PM Olivia Anaya PTA [facility]   7/9/2019  1:30 PM Jackie Beauchamp, PTA [facility]   7/9/2019  2:00 PM Fosterfinn Delcid ZSPIKLC [facility]   7/12/2019  2:00 PM Maya Delcid XIFMOKV [facility]   7/12/2019  3:00 PM Jackie Beauchamp, PTA [facility]   7/12/2019  3:30 PM Olivia Anaya, PTA [facility]   7/13/2019  1:30 PM Jackie Beauchamp, PTA [facility]   7/19/2019 10:30 AM Arlene Horan, [address]   7/22/2019  8:30 AM Antonio Gudino  [address]   7/22/2019  2:40 PM [facility] NURSE [facility] ROXANNE SCHED   9/11/2019 11:30 AM MD TARI NicholasMA-MO ROXANNE SCHED   10/2/2019  1:00 PM Given, Kimber Jeronimo MD [address] [facility]

## 2019-07-01 ENCOUNTER — TELEPHONE (OUTPATIENT)
Dept: FAMILY MEDICINE CLINIC | Facility: CLINIC | Age: 64
End: 2019-07-01

## 2019-07-01 ENCOUNTER — HOSPITAL ENCOUNTER (OUTPATIENT)
Dept: PHYSICAL THERAPY | Age: 64
Discharge: HOME | End: 2019-07-01
Payer: MEDICARE

## 2019-07-01 PROCEDURE — 97110 THERAPEUTIC EXERCISES: CPT

## 2019-07-01 PROCEDURE — 97016 VASOPNEUMATIC DEVICE THERAPY: CPT

## 2019-07-01 PROCEDURE — 97018 PARAFFIN BATH THERAPY: CPT

## 2019-07-01 RX ORDER — WARFARIN SODIUM 5 MG/1
TABLET ORAL
Qty: 75 TAB | Refills: 0 | Status: SHIPPED | OUTPATIENT
Start: 2019-07-01 | End: 2019-08-06 | Stop reason: SDUPTHER

## 2019-07-01 NOTE — PROGRESS NOTES
PT DAILY TREATMENT NOTE 10-18     Patient Name: Macy Rider  Date:2019  : 1955  [x]  Patient  Verified  Payor: VA MEDICARE / Plan: VA MEDICARE PART A & B / Product Type: Medicare /    In time:100  Out time:143  Total Treatment Time (min): 43  Visit #: 4 of     Medicare/BCBS Only   Total Timed Codes (min):  28 1:1 Treatment Time:  28       Treatment Area: Pain in left knee [M25.562]  Pain in right shoulder [M25.511]    SUBJECTIVE  Pain Level (0-10 scale): 1/10  Any medication changes, allergies to medications, adverse drug reactions, diagnosis change, or new procedure performed?: [x] No    [] Yes (see summary sheet for update)  Subjective functional status/changes:   [] No changes reported  Pt stated that his shoulder is really coming along good    OBJECTIVE    Modality rationale: decrease inflammation and decrease pain to improve the patients ability to increase ease with ADLs   Min Type Additional Details    [] Estim:  []Unatt       []IFC  []Premod                        []Other:  []w/ice   []w/heat  Position:  Location:    [] Estim: []Att    []TENS instruct  []NMES                    []Other:  []w/US   []w/ice   []w/heat  Position:  Location:    []  Traction: [] Cervical       []Lumbar                       [] Prone          []Supine                       []Intermittent   []Continuous Lbs:  [] before manual  [] after manual    []  Ultrasound: []Continuous   [] Pulsed                           []1MHz   []3MHz W/cm2:  Location:    []  Iontophoresis with dexamethasone         Location: [] Take home patch   [] In clinic    []  Ice     []  heat  []  Ice massage  []  Laser   []  Anodyne Position:  Location:    []  Laser with stim  []  Other:  Position:  Location:   15 [x]  Vasopneumatic Device Pressure:       [x] lo [] med [] hi   Temperature: [x] lo [] med [] hi   [x] Skin assessment post-treatment:  [x]intact []redness- no adverse reaction    []redness - adverse reaction:     28 min Therapeutic Exercise:  [x] See flow sheet :   Rationale: increase ROM and increase strength to improve the patients ability to increase ease with ADLs    With   [x] TE   [] TA   [] neuro   [] other: Patient Education: [x] Review HEP    [] Progressed/Changed HEP based on:   [] positioning   [] body mechanics   [] transfers   [] heat/ice application    [] other:      Other Objective/Functional Measures:   Had no difficulty with exercises  Tolerated increases made today without difficulty  Range of motion cont to improve     Pain Level (0-10 scale) post treatment: 0/10    ASSESSMENT/Changes in Function:   Pt is slowly progressing well toward goals. Strength and range of motion in right sh cont to improve. Pt reported that he cont to be unable to reach behind his back like he would like to. IR is improving slowly     Patient will continue to benefit from skilled PT services to modify and progress therapeutic interventions, address functional mobility deficits, address ROM deficits and address strength deficits to attain remaining goals. []  See Plan of Care  [x]  See progress note/recertification  []  See Discharge Summary         Progress towards goals / Updated goals:  1. Patient will increase right shoulder strength to 4/5 to improve ease of daily tasks. 2. Patient will increase functional IR ROM to level of L4 to improve ease of dressing. Slowly progressing. 19  3. Patient will report little difficulty reaching self overhead to restore ease of ADL's. Progressing.  19    PLAN  []  Upgrade activities as tolerated     [x]  Continue plan of care  []  Update interventions per flow sheet       []  Discharge due to:_  []  Other:_      Alyssa Lowe, BIRGIT 2019  12:46 PM    Future Appointments   Date Time Provider Dex [place]   2019  1:00 PM Kaylin Alu MMCPTPB [facility]   2019  1:45 PM Danni Novak, OTR/L MMCPTPB [facility]   2019  9:20 AM Brednen Reynolds MD [address] 7/3/2019 11:45 AM Burnadejarad Erickson BTTNXYB [facility]   7/3/2019 12:30 PM Yari Stiles, PT [id number] [facility]   7/3/2019  1:00 PM Nemo Mann MMCPTPB [facility]   7/5/2019  9:30 AM Abigail Vora MD 2VV ROXANNE [facility]   7/9/2019  1:00 PM Kiki Backer, PTA MMCPTPB [facility]   7/9/2019  1:30 PM Valorie Bachelor, PTA MMCPTPB [facility]   7/9/2019  2:00 PM Burnadette Dre GGVKCHB [facility]   7/12/2019  2:00 PM Burnadette Dre ENMKQTK [facility]   7/12/2019  3:00 PM Valorie Bachelor, PTA MMCPTPB [facility]   7/12/2019  3:30 PM Kiki Backer, PTA MMCPTPB [facility]   7/13/2019  1:30 PM Valorie Bachelor, PTA MMCPTPB [facility]   7/15/2019  2:00 PM Valorie Bachelor, PTA MMCPTPB [facility]   7/15/2019  2:30 PM Yari Stiles, PT EDYRLRM [facility]   7/15/2019  3:15 PM Burndiogenese Dre ISOKLWT [facility]   7/17/2019  1:45 PM Santosda Ora, OTR/L MMCPTPB [facility]   7/17/2019  2:30 PM Valorie Bachelor, PTA MMCPTPB [facility]   7/17/2019  3:00 PM Yari Stiles, PT [id number] [facility]   7/19/2019 10:30 AM July Dang PA-C VS ROXANNE SCHED   7/22/2019  8:30 AM Carin Baker  [address]   7/22/2019 10:15 AM Burnadette Broccoli KYOTJBW [facility]   7/22/2019 11:00 AM Valorie Flowers, PTA MMCPTPB [facility]   7/22/2019 11:30 AM Yari Stiles, PT FUCUSXL [facility]   7/22/2019  2:40 PM [facility] NURSE [address]   7/24/2019  2:00 PM Yari Stiles, PT ZNSHMUF [facility]   7/24/2019  2:30 PM Kiki Purdy, PTA MMCPTPB [facility]   7/24/2019  3:15 PM Burnadette Broccoli ERVSWSO [facility]   7/29/2019 11:00 AM Burnadette Broccoli JESSZVA [facility]   7/29/2019 12:00 PM Yari Stiles, PT MMCPTPB [facility]   7/29/2019 12:30 PM Yari Stiles, PT MMCPTPB [facility]   7/31/2019 10:30 AM Yari Stiles, PT ETHFPII [facility]   7/31/2019 11:00 AM Burnadette Broccoli DULYEVD [facility]   7/31/2019 12:30 PM Yari Stiles, PT MMCPTPB [facility]   9/11/2019 11:30 AM Tejas Farmer MD [facility]   10/2/2019  1:00 PM Karissa Chinchilla MD [address]

## 2019-07-01 NOTE — PROGRESS NOTES
PT DAILY TREATMENT NOTE 10-18    Patient Name: Leidy Jensen  Date:2019  : 1955  [x]  Patient  Verified  Payor: VA MEDICARE / Plan: VA MEDICARE PART A & B / Product Type: Medicare /    In time: 12:31 Out time: 1:00  Total Treatment Time (min): 29  Visit #: 4 of     Medicare/BCBS Only   Total Timed Codes (min):  29 1:1 Treatment Time: 29       Treatment Area: Presence of left artificial knee joint [Z96.652]  Iliotibial band syndrome, left leg [M76.32]    SUBJECTIVE  Pain Level (0-10 scale): 3/10  Any medication changes, allergies to medications, adverse drug reactions, diagnosis change, or new procedure performed?: [x] No    [] Yes (see summary sheet for update)  Subjective functional status/changes:   [] No changes reported   Pt reports he is doing better getting up from chairs and did it with the band around his knees this morning. They are still planning to order a stick massager to work on. He finds out from the MD if he can lay on his right side this week. OBJECTIVE    29 min Therapeutic Exercise:  [x] See flow sheet :   Rationale: increase ROM and increase strength to improve the patients ability to increase ease with ADLs    With   [x] TE   [] TA   [] neuro   [] other: Patient Education: [x] Review HEP    [] Progressed/Changed HEP based on:   [] positioning   [] body mechanics   [] transfers   [] heat/ice application    [] other:      Other Objective/Functional Measures:   Updated HEP for more glute focus  Multiple TPs along lateral quad/ITB and TFL  Instructed in rolling to loosen up  Compensation of TFL with clams and abduction; tactile cues at hips to improve glute engagement  Educated on SL bridge for progression  Provided with BTB for HEP    Pain Level (0-10 scale) post treatment: 1-2/10    ASSESSMENT/Changes in Function: Pt is making slow progress with continued mild 3/10 at left ITB insertion.  He has decreased glute strength with more recruitment of TFL than gluteus medius with hip strengthening exercises. Will continue to work on improving hip strength for decreased pain with ambulation and sit to stand transfers. Progress towards goals / Updated goals:  Short term goals: To be accomplished within 1 week  1. Pt will be independent with HEP to maintain progression.  MET (6/17/19)   Long term goals: To be accomplished within 8 weeks  1. Pt will improve FOTO score by 10 points to 57/100 to show improvement with functional mobility performance. 2. Pt will have Left knee AROM improved to 0-115 degs at least to amb household and community with normal and safe pattern. Not met: 106 degrees (6/21/19)  3. Pt will have B hip abd, ext strength improved to at least 4/5 to be able to amb longer distance and navigate stair safely.   4. Pt will report no more than 1-2/10 pain at worst so he can amb and perform ADLs with ease. Not met 3/10 (7/1/19)    PLAN  [x]  Upgrade activities as tolerated     [x]  Continue plan of care  []  Update interventions per flow sheet       []  Discharge due to:_  []  Other:_      Jose G Thomas PTA 7/1/2019  2:49 PM    Future Appointments   Date Time Provider Dex Alysia   7/1/2019 12:30 PM France Núñez PTA MMCPTPB [facility]   7/1/2019  1:00 PM Kaz Richardson PTA MMCPTPB [facility]   7/1/2019  1:45 PM Everton Santacruz OTR/L MMCPTPB [facility]   7/2/2019  9:20 AM Bhavya Khan MD VS ROXANNE SCHED   7/3/2019 11:45 AM Indigo Suarez SAQVBMY [facility]   7/3/2019 12:30 PM Sunitha Whiting PT MMCPTPB [facility]   7/3/2019  1:00 PM Wilian Figueredo MMCPTPB [facility]   7/5/2019  9:30 AM Anu Polanco MD 2VV ROXANNE SCHED   7/9/2019  1:00 PM France Núñez PTA MMCPTPB [facility]   7/9/2019  1:30 PM Kaz Richardson, PTA MMCPTPB [facility]   7/9/2019  2:00 PM Indigo Suarez FWPHRGY [facility]   7/12/2019  2:00 PM Indigo Suarez HKDXYPZ [facility]   7/12/2019  3:00 PM Kaz Richardson, PTA MMCPTPB [facility]   7/12/2019  3:30 PM France Núñez, PTA MMCPTPB [facility]   7/13/2019  1:30 PM Kaz Richardson, PTA MMCPTPB [facility]   7/15/2019  2:00 PM Freda Slaughter, PTA MMCPTPB [facility]   7/15/2019  2:30 PM Dank Sterling, PT HFEMBMW [facility]   7/15/2019  3:15 PM Daianashobha Robertso QSPWQRK [facility]   7/17/2019  1:45 PM Randee Sweet, OTR/L MMCPTPB [facility]   7/17/2019  2:30 PM Freda Slaughter, PTA MMCPTPB [facility]   7/17/2019  3:00 PM Dank Sterling, PT RPXBFPI [facility]   7/19/2019 10:30 AM Tennille Lombardi PA-C [facility] ROXANNE SCHED   7/22/2019  8:30 AM Alexis Blankenship  [address]   7/22/2019 10:15 AM Daianashobha Maloney UWNNZFA [facility]   7/22/2019 11:00 AM Freda Slaughter, PTA MMCPTPB [facility]   7/22/2019 11:30 AM Dank Sterling, PT EHKYPJN [facility]   7/22/2019  2:40 PM [facility] NURSE [facility] ROXANNE SCHED   7/24/2019  2:00 PM Dank Sterling, PT OKWMOYR [facility]   7/24/2019  2:30 PM Naima Ellis, PTA MMCPTPB [facility]   7/24/2019  3:15 PM Daiana Haider AWKDBHB [facility]   7/29/2019 11:00 AM Daiana Haider VQSIYJP [facility]   7/29/2019 12:00 PM Dank Sterling, PT MMCPTPB [facility]   7/29/2019 12:30 PM Dank Sterling, PT BBGDQCT [facility]   7/31/2019 10:30 AM Dank Sterling, PT HVMBJJF [facility]   7/31/2019 11:00 AM Daiana Maloney URLPPFA [facility]   7/31/2019 12:30 PM Dank Sterling, PT MMCPTPB [facility]   9/11/2019 11:30 AM [facility]   10/2/2019  1:00 PM Suze Chinchilla Mc, MD [address] [facility]

## 2019-07-01 NOTE — PROGRESS NOTES
OT DAILY TREATMENT NOTE 10-18    Patient Name: Michel Carr  Date:2019  : 1955  [x]  Patient  Verified  Payor: VA MEDICARE / Plan: VA MEDICARE PART A & B / Product Type: Medicare /    In time:145  Out time:230  Total Treatment Time (min): 45  Visit #: 6 of 16    Medicare/BCBS Only   Total Timed Codes (min):  35 1:1 Treatment Time:  45     Treatment Area: Pain in right hand [M79.641]  Pain in left hand [M79.642]    SUBJECTIVE  Pain Level (0-10 scale): 410  Any medication changes, allergies to medications, adverse drug reactions, diagnosis change, or new procedure performed?: [x] No    [] Yes (see summary sheet for update)  Subjective functional status/changes:   [] No changes reported  Positioning them better at night  Thumb bothering me on left     OBJECTIVE    Modality rationale: decrease pain and increase tissue extensibility to improve the patients ability to grasp   Min Type Additional Details    [] Estim:  []Unatt       []IFC  []Premod                        []Other:  []w/ice   []w/heatPosition:  Location:    [] Estim: []Att    []TENS instruct  []NMES                    []Other:  []w/US   []w/ice   []w/heat  Position:  Location:    []  Traction: [] Cervical       []Lumbar                       [] Prone          []Supine                       []Intermittent   []Continuous Lbs:  [] before manual  [] after manual    []  Ultrasound: []Continuous   [] Pulsed                           []1MHz   []3MHz W/cm2:  Location:    []  Iontophoresis with dexamethasone         Location: [] Take home patch   [] In clinic    []  Ice     []  heat  []  Ice massage  []  Laser   []  Anodyne Position:  Location:     10 []  Laser with stim  [x]  Other: Paraffin Position:  Location:both hands    []  Vasopneumatic Device Pressure:       [] lo [] med [] hi   Temperature: [] lo [] med [] hi       [x] Skin assessment post-treatment:  []intact []redness- no adverse reaction    []redness - adverse reaction:       35 min Therapeutic Exercise:  [] See flow sheet :   Rationale: increase ROM and increase strength to improve the patients ability to grasp  Towel scrunch x 10 both hands  Recheck  and pinch and thumb abduction  Ligament release right hand  Gripper 55# x 50 1 inch pegs, dropped 5      With   [x] TE   [] TA   [] neuro   [] other: Patient Education: [x] Review HEP    [] Progressed/Changed HEP based on: progress made  [] positioning   [] body mechanics   [] transfers   [] heat/ice application   [] Splint wear/care   [] Sensory re-education   [] scar management      [] other:            Other Objective/Functional Measures:     Hand Strength: Gross Grasp 3pt Pinch Lateral Pinch Tip Pinch   Right  43 (35) 14 (7) 19 (12) 11 (6)   Left 60 (40) 10 (8) 15 (11) 10 (7)      Thumb Rad Abd  Right 30  (15)   Left  50 (45)    Thumb palmar abduction  Right  40 (42)  Left 55 (55)    FOTO 63 (53)    Dropped 10 % of pegs when picking up with 55# gripper right     Pain Level (0-10 scale) post treatment: 3/10 left, 4/10 right    ASSESSMENT/Changes in Function: Improved  pinch, abduction    Patient will continue to benefit from skilled OT services to modify and progress therapeutic interventions, address ROM deficits, address strength deficits and instruct in home and community integration to attain remaining goals. []  See Plan of Care  []  See progress note/recertification  []  See Discharge Summary         Progress towards goals / Updated goals:  1.  Patient will report reduced pain in both hands with use of modalities in clinic. Met consistently with Paraffin use 6/17/19  2.  Patient will be independent in home program to increase thumb abduciton in right and both digit flexion. Progressing with in clinic activities 6/12/19  3.  Patient will be introduced to joint protection strategies and adaptive equipment to improve self and home care and reduce pain. Progressing with in clinic activities 6/17/19  4.  Patient will be independent in home program for hand strengthening.  Progressing with in clinic tasks 6/21/19     Long Term Goals: To be accomplished in 8 weeks:              1.  Patient will report pain diminished to 0-2/10 with routine use   2.  Patient will increase  strength in both hand by 10 pounds to increase ease of opening jarsmet for left, progressing for right 7/1/19  3.  Patient will incorporate adaptive equipment and strategies to improve independence in self care tasks such as buttoning, cutting, writing etc. Pt education provided on this date with demonstration 6/21/19  4.  Patient will report improved performance in lifting and carrying tasks as shown by improved FOTO of at least 10 points met 7/1/19       **    PLAN  []  Upgrade activities as tolerated     []  Continue plan of care  []  Update interventions per flow sheet       []  Discharge due to:_  []  Other:_      Kailey Mary, OTR/L 7/1/2019  1:52 PM    Future Appointments   Date Time Provider Dex [place]   7/2/2019  9:20 AM Obi Lin MD [facility]   7/3/2019 11:45 AM Pietro Wilcox KMERYOA [facility]   7/3/2019 12:30 PM Ramo Whitaker, PT MMCPTPB [facility]   7/3/2019  1:00 PM Apolonia Pillai, PTA MMCPTPB [facility]   7/5/2019  9:30 AM Renuka Carmen, [address] [facility]   7/9/2019  1:00 PM Kate Herrera, PTA MMCPTPB [facility]   7/9/2019  1:30 PM Gerald Aguilar, PTA MMCPTPB [facility]   7/9/2019  2:00 PM Pietro Wilcox XVDAMZL [facility]   7/12/2019  2:00 PM Pietro Wilcox PNGDVOP [facility]   7/12/2019  3:00 PM Gerald Aguilar, PTA MMCPTPB [facility]   7/12/2019  3:30 PM Kate Herrera, PTA MMCPTPB [facility]   7/13/2019  1:30 PM Earvin Spoon, PTA MMCPTPB [facility]   7/15/2019  2:00 PM Earvin Spoon, PTA MMCPTPB [facility]   7/15/2019  2:30 PM Ramo Fix, PT JYDTYHC [facility]   7/15/2019  3:15 PM Pietro Wilcox [id number] [facility]   7/17/2019  1:45 PM Ramiro Caballero, OTR/L MMCPTPB [facility]   7/17/2019  2:30 PM Gerald Spoon, PTA MMCPTPB [facility]   7/17/2019  3:00 PM Sesma Render Blocker, PT MMCPTPB [facility]   7/19/2019 10:30 AM ESPERANZA [address]   7/22/2019  8:30 AM Yemi Moody  [address]   7/22/2019 10:15 AM Earnstine Isis EXEAHUQ [facility]   7/22/2019 11:00 AM Edwige Vazquez, PTA MMCPTPB [facility]   7/22/2019 11:30 AM Paigejuan luis Rascon, PT [id number] [facility]   7/22/2019  2:40 PM [facility] NURSE [facility]   7/24/2019  2:00 PM Paige Rascon, PT RNPLPFH [facility]   7/24/2019  2:30 PM Santiago Mcnamara, PTA MMCPTPB [facility]   7/24/2019  3:15 PM Earnstine Isis TFIWFDG [facility]   7/29/2019 11:00 AM Earnstine Isis XRQYFKM [facility]   7/29/2019 12:00 PM Paige Rascon, PT MMCPTPB [facility]   7/29/2019 12:30 PM Paige Rascon, PT SAWXQMG [facility]   7/31/2019 10:30 AM Paige Rascon, PT IDNWJOO [facility]   7/31/2019 11:00 AM Earnstine Isis GOJKYGJ [facility]   7/31/2019 12:30 PM Paige Rascon, PT MMCPTPB [facility]   9/11/2019 11:30 AM Jag Simon MD [facility] ROXANNE SCHED   10/2/2019  1:00 PM Cassi Chinchilla MD [address] [facility]

## 2019-07-01 NOTE — PROGRESS NOTES
[facility] - Dank Albarran  [address] [place] [address]  [phone] fax    Medicare Progress Report    Patient name: Rubi Delacruz Start of Care: 6/3/19   Referral source: Jaime Keys MD : 1955   Medical/Treatment Diagnosis: Pain in right hand [M79.641]  Pain in left hand [M79.642]  Payor: [place] MEDICARE / Plan: [place] MEDICARE PART A & B / Product Type: Medicare /  Onset Date:6 months     Prior Hospitalization: see medical history Provider#: [id number]   Medications: Verified on Patient Summary List    Comorbidities: Right shoulder pain, TKR, back surgery, HTN, eye surgery, prostate CA  Prior Level of Function:i self care home care driving, works as  at [employer], used to like hunting and camping  Visits from [place] of Care: 6    Missed Visits: 0    Reporting Period: 6/3/19 to 19  Missed visits due to eye surgery    Subjective Reports: Hands feeling better    1.  Patient will report reduced pain in both hands with use of modalities in clinic. Met   2.  Patient will be independent in home program to increase thumb abduciton in right and both digit flexion. Progressing   3.  Patient will be introduced to joint protection strategies and adaptive equipment to improve self and home care and reduce pain. Progressing   4.  Patient will be independent in home program for hand strengthening.  Progressing       Current Status/ treatment goals Objective measures   1.  Patient will report pain diminished to 0-2/10 with routine use        [] met                 [x] not met  [] progressing  pain continues to fluctuate between 3-4/10   2. *2.  Patient will increase  strength in both hand by 10 pounds to increase ease of opening jars     [] met                 [] not met  [x] progressing Met for left ( increased 20#, progressing for right ( increased 8#)   3. *.  Patient will incorporate adaptive equipment and strategies to improve independence in self care tasks such as buttoning, cutting, writing etc   [] met                 [] not met  [x] progressing Review begun   4.  Patient will report improved performance in lifting and carrying tasks as shown by improved FOTO of at least 10 points        [x] met                 [] not met  [] progressing FOTO increased 10 points     Key functional changes: Improved strength, abduction especially in left, function per FOTO   Current measures listed below with prior in ( )     Hand Strength: Gross Grasp 3pt Pinch Lateral Pinch Tip Pinch   Right  43 (35) 14 (7) 19 (12) 11 (6)   Left 60 (40) 10 (8) 15 (11) 10 (7)                 Thumb Rad Abd  Right   30        (15)              Left  50 (45)     Thumb palmar abduction  Right   40        (42)  Left      55        (55)     FOTO  63        (53)      Problems/ barriers to goal attainment: Missd visits due to eye surgery     Assessment / Recommendations:Patient will benefit from continued skilled occupational therapy to increase upper extremity function and functional independence. Problem List: Pain effecting function, Decreased range of motion, Decreased strength, Decreased ADL/functional abilities  and Decreased activity tolerance   Treatment Plan: Therapeutic exercise, Therapeutic activities, Physical agent/modality, Manual therapy, Patient education and ADLs/IADLs    Patient Goal (s) has been updated and includes: better strength to open jars     Updated Goals to be accomplished in 4 weeks:  STGs continued:  2.  Patient will be independent in home program to increase thumb abduciton in right and both digit flexion. 3.  Patient will be introduced to joint protection strategies and adaptive equipment to improve self and home care and reduce pain.   4.  Patient will be independent in home program for hand strengthening    LTGs  1.  Patient will report pain diminished to 0-2/10 with routine use   2.  Patient will increase  strength in both hand by 10 pounds to increase ease of opening jars   3.  Patient will incorporate adaptive equipment and strategies to improve independence in self care tasks such as buttoning, cutting, writing etc.     Frequency / Duration: Patient to be seen 2 times per week for 10 visits:        TEODORO Roger/L 7/1/2019 2:46 PM

## 2019-07-02 ENCOUNTER — OFFICE VISIT (OUTPATIENT)
Dept: ORTHOPEDIC SURGERY | Facility: CLINIC | Age: 64
End: 2019-07-02

## 2019-07-02 VITALS
SYSTOLIC BLOOD PRESSURE: 133 MMHG | WEIGHT: 230.6 LBS | TEMPERATURE: 95.9 F | OXYGEN SATURATION: 97 % | DIASTOLIC BLOOD PRESSURE: 69 MMHG | HEIGHT: 70 IN | RESPIRATION RATE: 18 BRPM | HEART RATE: 74 BPM | BODY MASS INDEX: 33.01 KG/M2

## 2019-07-02 DIAGNOSIS — S46.011D TRAUMATIC COMPLETE TEAR OF RIGHT ROTATOR CUFF, SUBSEQUENT ENCOUNTER: Primary | ICD-10-CM

## 2019-07-02 NOTE — PROGRESS NOTES
Patient: Gianna Cohen                MRN: [id number]       SSN: [id number]  YOB: 1955        AGE: 75 y.o. SEX: male  Body mass index is 33.09 kg/m². PCP: Zoila Vázquez MD  07/02/19    Chief Complaint: Right shoulder follow up    HISTORY OF PRESENT ILLNESS:  Maycol Parmar returns to the office today for his right shoulder. He is now five-plus months out from his shoulder surgery. He is doing well. He is continuing to work on range of motion and strengthening and physical therapy, and he is improving. He reports most days, he has no pain in his shoulder, but occasionally, it is a 1/10. Past Medical History:   Diagnosis Date    Arthritis     Bleeding     Chronic pain     knee and shoulder    GERD (gastroesophageal reflux disease)     Headache(784.0)     migraine    High cholesterol     Hypertension     Lower back pain 11/6/2010    Other chest pain     Pure hypercholesterolemia     Right buttock pain 11/6/2010    Right foot pain     Rotator cuff tear     left-since 2010, worsened tear Jan.    Rotator cuff tear, right     Spinal stenosis     Tendonitis, tibialis     anterior    Thromboembolus (HCC)     3 after sx last one 2000       Family History   Problem Relation Age of Onset   [place]ina[place] Arthritis-rheumatoid Mother     Dementia Mother     Heart Disease Father     Cancer Father     Hypertension Other     Cancer Brother        Current Outpatient Medications   Medication Sig Dispense Refill    warfarin (COUMADIN) 5 mg tablet TAKE 2 AND 1/2 TABLETS BY MOUTH DAILY OR AS DIRECTED 75 Tab 0    lansoprazole (PREVACID) 30 mg capsule TAKE 1 CAPSULE DAILY BEFOREBREAKFAST 90 Cap 3    butalbital-acetaminophen-caff (FIORICET) -40 mg per capsule Take 1 Cap by mouth every eight (8) hours as needed for Pain. 90 Cap 1    diclofenac (VOLTAREN) 1 % gel Apply 4 g to affected area four (4) times daily. Maximum 16 grams per joint per day.  Dispense 5 100 gram tubes 5 Each 0  lisinopril-hydroCHLOROthiazide (PRINZIDE, ZESTORETIC) 20-12.5 mg per tablet TAKE 1 TABLET BY MOUTH DAILY 90 Tab 0    labetalol (NORMODYNE) 100 mg tablet TAKE ONE TABLET BY MOUTH TWICE DAILY 180 Tab 0    raNITIdine (ZANTAC) 150 mg tablet TK 1 T PO HS  1    ALPRAZolam (XANAX) 0.5 mg tablet Take one half(1/2) tab to one(1) tab by mouth at bedtime as needed for sleep 30 Tab 0    warfarin (COUMADIN) 5 mg tablet TAKE 2 AND 1/2 TABLETS BY MOUTH DAILY OR AS DIRECTED 75 Tab 0    warfarin (COUMADIN) 3 mg tablet Or as directed 30 Tab 3    montelukast (SINGULAIR) 10 mg tablet TAKE 1 TABLET BY MOUTH EVERY DAY (Patient taking differently: TAKE 1 TABLET BY MOUTH EVERY HS) 90 Tab 0    acetaminophen (TYLENOL) 500 mg tablet Take 1,000 mg by mouth every six (6) hours as needed for Pain.  diclofenac (VOLTAREN) 1 % gel Apply 2 g to affected area four (4) times daily. 100 g 2    celecoxib (CELEBREX) 200 mg capsule Take 1 Cap by mouth two (2) times a day for 90 days. (Patient taking differently: Take 200 mg by mouth two (2) times a day. Indications: 2 tabs weekly) 60 Cap 2    montelukast (SINGULAIR) 10 mg tablet TAKE 1 TABLET BY MOUTH EVERY DAY 90 Tab 0    celecoxib (CELEBREX) 50 mg capsule Take 50 mg by mouth two (2) times a day. Indications: 2 tabs weekly      metaxalone (SKELAXIN) 800 mg tablet Take 1 Tab by mouth three (3) times daily.  Indications: muscle spasm 90 Tab 2       Allergies   Allergen Reactions    Amoxicillin Itching    Augmentin [Amoxicillin-Pot Clavulanate] Itching    Chlorhexidine Towelette Itching    Hibiclens [Chlorhexidine Gluconate] Itching    Milk Containing Products Diarrhea    Penicillins Rash    Requip [Ropinirole] Nausea and Vomiting       Past Surgical History:   Procedure Laterality Date    FOOT/TOES SURGERY PROC UNLISTED      HX BACK SURGERY      HX HEENT Right 09/2018    eye surgery, macular     HX KNEE REPLACEMENT Left     HX ORTHOPAEDIC  06-25-12    Right foot with excision of bursa and adipose tissue from fifth metatarsal base by Dr. Gwen Momin      lower back (1992 and 2000)    HX OTHER SURGICAL      left foot (2008)    HX OTHER SURGICAL      Retina repair     HX PROSTATECTOMY  11/2018    HX ROTATOR CUFF REPAIR Right 01/28/2019    by Dr. Ena Ceballos History     Socioeconomic History    Marital status:      Spouse name: Not on file    Number of children: Not on file    Years of education: Not on file    Highest education level: Not on file   Occupational History    Not on file   Social Needs    Financial resource strain: Not on file    Food insecurity:     Worry: Not on file     Inability: Not on file    Transportation needs:     Medical: Not on file     Non-medical: Not on file   Tobacco Use    Smoking status: Never Smoker    Smokeless tobacco: Never Used   Substance and Sexual Activity    Alcohol use: No    Drug use: No    Sexual activity: Not Currently   Lifestyle    Physical activity:     Days per week: Not on file     Minutes per session: Not on file    Stress: Not on file   Relationships    Social connections:     Talks on phone: Not on file     Gets together: Not on file     Attends Judaism service: Not on file     Active member of club or organization: Not on file     Attends meetings of clubs or organizations: Not on file     Relationship status: Not on file    Intimate partner violence:     Fear of current or ex partner: Not on file     Emotionally abused: Not on file     Physically abused: Not on file     Forced sexual activity: Not on file   Other Topics Concern     Service Not Asked    Blood Transfusions Not Asked    Caffeine Concern Not Asked    Occupational Exposure Not Asked   Uzma Harms Hazards Not Asked    Sleep Concern Not Asked    Stress Concern Not Asked    Weight Concern Not Asked    Special Diet Not Asked    Back Care Not Asked    Exercise Not Asked    Bike Helmet Not Asked    Seat Belt Not Asked    Self-Exams Not Asked   Social History Narrative    Not on file       REVIEW OF SYSTEMS:      No changes from previous review of systems unless noted. PHYSICAL EXAMINATION:  Visit Vitals  /69   Pulse 74   Temp 95.9 °F (35.5 °C) (Oral)   Resp 18   Ht 5' 10\" (1.778 m)   Wt 230 lb 9.6 oz (104.6 kg)   SpO2 97%   BMI 33.09 kg/m²     Body mass index is 33.09 kg/m². GENERAL: Alert and oriented x3, in no acute distress. HEENT: Normocephalic, atraumatic. RESP: Non labored breathing. SKIN: No rashes or lesions noted. Physical exam of the right shoulder is with improving range of motion. He has active forward flexion to 160ø today and external rotation of 40ø. Internal rotation is to the sacrum. Gentle rotator cuff strength testing does not reveal any weakness or pain. ASSESSMENT/PLAN:  Nic Carcamo is now five months out from his shoulder surgery. He is still in physical therapy, but I think he can begin to taper that and focus more on a home exercise program.  He will be six months from surgery at the end of this month, and I would likely, at that point, release him back to work. I will see him back later in the month before he is released.             Electronically signed by: Viviane Diaz MD

## 2019-07-03 ENCOUNTER — DOCUMENTATION ONLY (OUTPATIENT)
Dept: FAMILY MEDICINE CLINIC | Facility: CLINIC | Age: 64
End: 2019-07-03

## 2019-07-03 ENCOUNTER — HOSPITAL ENCOUNTER (OUTPATIENT)
Dept: PHYSICAL THERAPY | Age: 64
Discharge: HOME | End: 2019-07-03
Payer: MEDICARE

## 2019-07-03 PROCEDURE — 97110 THERAPEUTIC EXERCISES: CPT

## 2019-07-03 PROCEDURE — 97018 PARAFFIN BATH THERAPY: CPT

## 2019-07-03 PROCEDURE — 97140 MANUAL THERAPY 1/> REGIONS: CPT

## 2019-07-03 NOTE — PROGRESS NOTES
I spoke to Abhijeet Nieto NP regarding the patient thes PT at 1.9 for DVT done on 6/18/19. PT good per Abhijeet Nieto NP and have the patient come in on next week and have a PT. Also patient wants results of other lab results done . I stated when he comes in to have another PT then Dr. Laisha Rivera can address it then.

## 2019-07-03 NOTE — PROGRESS NOTES
PT DAILY TREATMENT NOTE 10-18    Patient Name: Katie Hassan  Date:7/3/2019  : 1955  [x]  Patient  Verified  Payor: [place] MEDICARE / Plan: [place] MEDICARE PART A & B / Product Type: Medicare /    In time:1230  Out time:104  Total Treatment Time (min): 34  Visit #: 4 of     Medicare/BCBS Only   Total Timed Codes (min):  34 1:1 Treatment Time:  34       Treatment Area: Pain in left knee [M25.562]  Pain in right shoulder [M25.511]    SUBJECTIVE  Pain Level (0-10 scale): 1/10  Any medication changes, allergies to medications, adverse drug reactions, diagnosis change, or new procedure performed?: [x] No    [] Yes (see summary sheet for update)  Subjective functional status/changes:   [] No changes reported  Pt had follow up yesterday for shoulder, MD states he can be done with therapy In 2 weeks. Knee isnt doing good, but shoulder is good. MD pleased with shoulder. OBJECTIVE    34 min Therapeutic Exercise:  [x] See flow sheet :   Rationale: increase ROM and increase strength to improve the patients ability to increase ease of ADL's. With   [] TE   [] TA   [] neuro   [] other: Patient Education: [x] Review HEP    [] Progressed/Changed HEP based on:   [] positioning   [] body mechanics   [] transfers   [] heat/ice application    [] other:      Other Objective/Functional Measures:   3+ to 4-/5 shoulder flexion  Practiced reaching into shelf at shoulder height with 2# x10 and above shoulder height without weight  Progressed there-ex to continue strengthening shoulder    Pain Level (0-10 scale) post treatment: 0/10    ASSESSMENT/Changes in Function: Patient is making grood progress towards goals. He demonstrates improved functional IR ROM to level of L4. Pt has no difficulty reaching at shoulder height and a little difficulty reaching above shoulder height. Progressed program to continue strengthening shoulder.      Patient will continue to benefit from skilled PT services to modify and progress therapeutic interventions, address ROM deficits, address strength deficits and analyze and address soft tissue restrictions to attain remaining goals. [x]  See Plan of Care  []  See progress note/recertification  []  See Discharge Summary         Progress towards goals / Updated goals:  1. Patient will increase right shoulder strength to 4/5 to improve ease of daily tasks. Progressing 7/3  2. Patient will increase functional IR ROM to level of L4 to improve ease of dressing. Met 7/3  3. Patient will report little difficulty reaching self overhead to restore ease of ADL's.   Met 7/3         PLAN  [x]  Upgrade activities as tolerated     [x]  Continue plan of care  [x]  Update interventions per flow sheet       []  Discharge due to:_  []  Other:_      Nusrat Kraus, GALILEO 7/3/2019  12:33 PM    Future Appointments   Date Time Provider [place]   7/3/2019  1:00 PM Yuri Benedict PTA [facility]CPTP[facility]   7/9/2019  1:00 PM Roseline Lainez PTA [facility]   7/9/2019  1:30 PM Jolie Nieves PTA [facility]   7/9/2019  2:00 PM Marcia Gonzalez [id number] [facility]   7/10/2019  2:00 PM CARA Dugan 2VV ROXANNE SCHED   7/12/2019  2:00 PM Marcia Gonzalez GZSIOGI [facility]   7/12/2019  3:00 PM Jolie Nieves PTA [facility]   7/12/2019  3:30 PM Roseline Lainez PTA [facility]   7/15/2019  2:00 PM Jolie Nieves PTA [facility]   7/15/2019  2:30 PM Chad See PT [id number] [facility]   7/15/2019  3:15 PM Marcia Gonzalez LHDXTRA [facility]   7/17/2019  1:45 PM Tommy Bloomal, OTR/L [facility]   7/17/2019  2:30 PM Jolie Nieves PTA [facility]   7/17/2019  3:00 PM Chad See PT [id number] [facility]   7/19/2019 10:30 AM Prachi Zamudio PA-C [facility]   7/22/2019  8:30 AM Charly Mccann  [address]   7/22/2019 10:15 AM Marcia GAVIRIASZXG [facility]   7/22/2019 11:00 AM Jolie Nieves PTA [facility]   7/22/2019 11:30 AM Chad See, PT [facility]CP[facility]   7/22/2019  2:40 PM [facility] [address]   7/24/2019  2:00 PM Matthew Maria, PT [facility]CPTPB [facility]   7/24/2019  2:30 PM Roula Yañez, PTA [facility]   7/24/2019  3:15 PM Rachelle Mitts QNTIDML [facility]   7/29/2019 11:00 AM Rachelle Mitts LXPVZPZ [facility]   7/29/2019 12:00 PM Matthew Maria, PT [facility]CP[facility]   7/29/2019 12:30 PM Matthew Maria, PT [id number] [facility]   7/31/2019 10:30 AM Matthew Maria, PT [id number] [facility]   7/31/2019 11:00 AM Rachelle Mitts QHZALDD [facility]   7/31/2019 12:30 PM Matthew Maria, PT MMCPTPB [facility]   9/11/2019 11:30 AM [facility]   10/2/2019  1:00 PM Michelle Chinchilla MD [place]

## 2019-07-03 NOTE — PROGRESS NOTES
PT DAILY TREATMENT NOTE 10-18    Patient Name: Suzanna Willoughby  Date:7/3/2019  : 1955  [x]  Patient  Verified  Payor: [place] MEDICARE / Plan: VA MEDICARE PART A & B / Product Type: Medicare /    In time:1:05  Out time:1:45  Total Treatment Time (min): 40  Visit #: 5 of     Medicare/BCBS Only   Total Timed Codes (min):  30 1:1 Treatment Time:  25       Treatment Area: Presence of left artificial knee joint [Z96.652]  Iliotibial band syndrome, left leg [M76.32]    SUBJECTIVE  Pain Level (0-10 scale): 4  Any medication changes, allergies to medications, adverse drug reactions, diagnosis change, or new procedure performed?: [x] No    [] Yes (see summary sheet for update)  Subjective functional status/changes:   [] No changes reported  \"It's not great\"    OBJECTIVE    Modality rationale: decrease pain to improve the patients ability to perform daily tasks   Min Type Additional Details    [] Estim:  []Unatt       []IFC  []Premod                        []Other:  []w/ice   []w/heat  Position:  Location:    [] Estim: []Att    []TENS instruct  []NMES                    []Other:  []w/US   []w/ice   []w/heat  Position:  Location:    []  Traction: [] Cervical       []Lumbar                       [] Prone          []Supine                       []Intermittent   []Continuous Lbs:  [] before manual  [] after manual    []  Ultrasound: []Continuous   [] Pulsed                           []1MHz   []3MHz W/cm2:  Location:    []  Iontophoresis with dexamethasone         Location: [] Take home patch   [] In clinic   10 [x]  Ice     []  heat  []  Ice massage  []  Laser   []  Anodyne Position: seated  Location: left hip/IT band    []  Laser with stim  []  Other:  Position:  Location:    []  Vasopneumatic Device Pressure:       [] lo [] med [] hi   Temperature: [] lo [] med [] hi   [] Skin assessment post-treatment:  []intact []redness- no adverse reaction    []redness - adverse reaction:     22 min Therapeutic Exercise:  [] See flow sheet :   Rationale: increase strength to improve the patients ability to perform ADLs    8 min Manual Therapy:  DTM and stick to left IT band, glutes, and quads   Rationale: decrease pain, increase ROM and increase tissue extensibility to improve functional mobility            With   [] TE   [] TA   [] neuro   [] other: Patient Education: [x] Review HEP    [] Progressed/Changed HEP based on:   [] positioning   [] body mechanics   [] transfers   [] heat/ice application    [] other:      Other Objective/Functional Measures: Added supine hip flexor str  Cont'd TTP @ left piri and IT band     Pain Level (0-10 scale) post treatment: 2    ASSESSMENT/Changes in Function: Pt reports incr'd soreness with prolonged walking. Self massage and cold pack decr's pain. Added supine hip flexor str to HEP. Patient will continue to benefit from skilled PT services to modify and progress therapeutic interventions, address functional mobility deficits, address ROM deficits, address strength deficits, analyze and address soft tissue restrictions, analyze and cue movement patterns and analyze and modify body mechanics/ergonomics to attain remaining goals. []  See Plan of Care  []  See progress note/recertification  []  See Discharge Summary         Progress towards goals / Updated goals:  Short term goals: To be accomplished within 1 week  1. Pt will be independent with HEP to maintain progression.  MET (6/17/19)   Long term goals: To be accomplished within 8 weeks  1. Pt will improve FOTO score by 10 points to 57/100 to show improvement with functional mobility performance. 2. Pt will have Left knee AROM improved to 0-115 degs at least to amb household and community with normal and safe pattern. Not met: 106 degrees (6/21/19)  3. Pt will have B hip abd, ext strength improved to at least 4/5 to be able to amb longer distance and navigate stair safely.   4. Pt will report no more than 1-2/10 pain at worst so he can amb and perform ADLs with ease. Not met 3/10 (7/1/19)        PLAN  []  Upgrade activities as tolerated     [x]  Continue plan of care  []  Update interventions per flow sheet       []  Discharge due to:_  []  Other:_      Manuel Rivera, BIRGIT 7/3/2019  1:01 PM    Future Appointments   Date Time Provider Dex [place]   7/9/2019  1:00 PM Valentin Prieto, PTA MMCPTPB [facility]   7/9/2019  1:30 PM Silvia Lew, PTA MMCPTPB [facility]   7/9/2019  2:00 PM Soledad Huddleston MESVBRR [facility]   7/10/2019  2:00 PM CARA Barber 2VV ROXANNE SCHED   7/12/2019  2:00 PM Soledad Huddleston LNZLJJC [facility]   7/12/2019  3:00 PM Silvia Lew, PTA MMCPTPB [facility]   7/12/2019  3:30 PM Valentin Prieto, PTA MMCPTPB [facility]   7/15/2019  2:00 PM Silvia Lew, PTA MMCPTPB [facility]   7/15/2019  2:30 PM Lelo Maldonado, PT OUBMZHJ [facility]   7/15/2019  3:15 PM Soledad Huddleston OAYXNJI [facility]   7/17/2019  1:45 PM Sharon Salmon OTR/L MMCPTPB [facility]   7/17/2019  2:30 PM Silvia Lew, PTA MMCPTPB [facility]   7/17/2019  3:00 PM Lelo Maldonado, PT [id number] [facility]   7/19/2019 10:30 AM Maxx Vicente PA-C VSHV ROXANNE SCHED   7/22/2019  8:30 AM Juanita Dupont  [address]   7/22/2019 10:15 AM Soledad Huddleston PEQMVJU [facility]   7/22/2019 11:00 AM Silvia Lew, PTA MMCPTPB [facility]   7/22/2019 11:30 AM Lelo Show, PT OSUYLJI [facility]   7/22/2019  2:40 PM [facility] NURSE [place] [address]   7/24/2019  2:00 PM Lelo Show, PT CTCCNFU [facility]   7/24/2019  2:30 PM Valentin Prieto, PTA MMCPTPB [facility]   7/24/2019  3:15 PM Soledad Huddleston ZXSOFXB [facility]   7/29/2019 11:00 AM Soledad Huddleston EBDRHUM [facility]   7/29/2019 12:00 PM Lelo Show, PT MMCPTPB [facility]   7/29/2019 12:30 PM Lelo Show, PT [id number] [facility]   7/31/2019 10:30 AM Lelo Show, PT MMCPTPB [facility]   7/31/2019 11:00 AM Soledad Huddleston YWFNDPL [facility]   7/31/2019 12:30 PM Lelo Show, PT MMCPTPB [facility]   9/11/2019 11:30 AM MD CARLY Brewer-REBECCA OLIVEIRA [facility]   10/2/2019  1:00 PM Amor MD Cliff Strong

## 2019-07-03 NOTE — PROGRESS NOTES
OT DAILY TREATMENT NOTE 10-18    Patient Name: Jose Luis Fang  Date:7/3/2019  : 1955  [x]  Patient  Verified  Payor: VA MEDICARE / Plan: VA MEDICARE PART A & B / Product Type: Medicare /    In time:1145  Out time:1230  Total Treatment Time (min): 45  Visit #: 1 of 10    Medicare/BCBS Only   Total Timed Codes (min):  35 1:1 Treatment Time:  45     Treatment Area: Pain in right hand [M79.641]  Pain in left hand [M79.642]    SUBJECTIVE  Pain Level (0-10 scale): 3/10  Any medication changes, allergies to medications, adverse drug reactions, diagnosis change, or new procedure performed?: [x] No    [] Yes (see summary sheet for update)  Subjective functional status/changes:   [] No changes reported  I'm trying to go back to work     OBJECTIVE    Modality rationale: decrease pain and increase tissue extensibility to improve the patients ability to grasp   Min Type Additional Details    [] Estim:  []Unatt       []IFC  []Premod                        []Other:  []w/ice   []w/heat  Position:  Location:    [] Estim: []Att    []TENS instruct  []NMES                    []Other:  []w/US   []w/ice   []w/heat  Position:  Location:    []  Traction: [] Cervical       []Lumbar                       [] Prone          []Supine                       []Intermittent   []Continuous Lbs:  [] before manual  [] after manual    []  Ultrasound: []Continuous   [] Pulsed                           []1MHz   []3MHz Location:  W/cm2:    []  Iontophoresis with dexamethasone         Location: [] Take home patch   [] In clinic    []  Ice     []  heat  []  Ice massage  []  Laser   []  Anodyne Position:  Location:   10 []  Laser with stim  []  Other: Position:  Location: B Hands    []  Vasopneumatic Device Pressure:       [] lo [] med [] hi   Temperature: [] lo [] med [] hi   [x] Skin assessment post-treatment:  [x]intact []redness- no adverse reaction    []redness - adverse reaction:     35 min Therapeutic Exercise:  [] See flow sheet : Rationale: increase ROM and increase strength to improve the patients ability to     Towel Scrunch, B Hands 10x   Medium Putty: 10/10, each hand   Ligament Release: Right hand  HEP: Hand strengthening with medium putty administered, 10x each B Hands     With   [x] TE   [] TA   [] neuro   [] other: Patient Education: [x] Review HEP    [] Progressed/Changed HEP based on:   [] positioning   [] body mechanics   [] transfers   [] heat/ice application   [] Splint wear/care   [] Sensory re-education   [] scar management      [] other:            Other Objective/Functional Measures:     Slight increase in discomfort in Right hand post medium Theraputty HEP     Pain Level (0-10 scale) post treatment: 1/10- Left, 2/10 Right    ASSESSMENT/Changes in Function: Strength improving     Patient will continue to benefit from skilled OT services to modify and progress therapeutic interventions, address ROM deficits, address strength deficits and instruct in home and community integration to attain remaining goals. []  See Plan of Care  []  See progress note/recertification  []  See Discharge Summary         Progress towards goals / Updated goals:  Updated Goals to be accomplished in 4 weeks:  STGs continued:  2.  Patient will be independent in home program to increase thumb abduciton in right and both digit flexion. 3.  Patient will be introduced to joint protection strategies and adaptive equipment to improve self and home care and reduce pain. Progressing with in clinic tasks/Pt education 7/3/19  4.  Patient will be independent in home program for hand strengthening Medium Putty HEP administered on this date 7/3/19     LTGs  1.  Patient will report pain diminished to 0-2/10 with routine use   2.  Patient will increase  strength in both hand by 10 pounds to increase ease of opening jars Progressing with in clinic activities 7/3/19  3.  Patient will incorporate adaptive equipment and strategies to improve independence in self care tasks such as buttoning, cutting, writing etc.         PLAN  []  Upgrade activities as tolerated     [x]  Continue plan of care  []  Update interventions per flow sheet       []  Discharge due to:_  []  Other:_      WES Linton/L 7/3/2019  11:55 AM    Future Appointments   Date Time Provider Dex Alysia   7/3/2019 12:30 PM Francesco Crews, PT MMCPTPB [facility]   7/3/2019  1:00 PM Skipper Shown, PTA MMCPTPB [facility]   7/9/2019  1:00 PM Errariela Faith, PTA MMCPTPB [facility]   7/9/2019  1:30 PM Carry Huge, PTA MMCPTPB [facility]   7/9/2019  2:00 PM Quiana Stuart STSQZAJ [facility]   7/10/2019  2:00 PM CARA Tolliver 2VV ROXANNE SCHED   7/12/2019  2:00 PM Quiana Stuart SGPSIMG [facility]   7/12/2019  3:00 PM Carry Huge, PTA MMCPTPB [facility]   7/12/2019  3:30 PM Kamlesh Cuevas, PTA MMCPTPB [facility]   7/15/2019  2:00 PM Carry Huge, PTA MMCPTPB [facility]   7/15/2019  2:30 PM Francesco Crejunior, PT RTRKXAT [facility]   7/15/2019  3:15 PM Quiana Stuart GDEHUQH [facility]   7/17/2019  1:45 PM TEODORO Porter/EFFIE MMCPTPB [facility]   7/17/2019  2:30 PM Carry Huge, PTA MMCPTPB [facility]   7/17/2019  3:00 PM Francesco Garsia, PT PZYDWAP [facility]   7/19/2019 10:30 AM Darcy Henriquez [facility]   7/22/2019  8:30 AM Nedra Juan  [address]   7/22/2019 10:15 AM Quiana Stuart UUARMRF [facility]   7/22/2019 11:00 AM Salud Andrade, PTA MMCPTPB [facility]   7/22/2019 11:30 AM Francesco Garsia, PT HNGHMZS [facility]   7/22/2019  2:40 PM [facility] NURSE [facility]   7/24/2019  2:00 PM Francesco Garsia, PT DJPAGSG [facility]   7/24/2019  2:30 PM Kamlesh Cuevas, PTA MMCPTPB [facility]   7/24/2019  3:15 PM Quiana Stuart ZDUJJCQ [facility]   7/29/2019 11:00 AM Quiana Stuart TVBDOBC [facility]   7/29/2019 12:00 PM Francesco Garsia, PT MMCPTPB [facility]   7/29/2019 12:30 PM Francesco Garsia, PT RSHBUUV [facility]   7/31/2019 10:30 AM Francesco Garsia, PT TJFXUHT [facility]   7/31/2019 11:00 AM Quiana Stuart SYGKJFH [facility]   7/31/2019 12:30 PM [facility]   9/11/2019 11:30 AM MD CARLY [facility]   10/2/2019  1:00 PM Given, Michelle Rose MD [address]

## 2019-07-08 ENCOUNTER — CLINICAL SUPPORT (OUTPATIENT)
Dept: FAMILY MEDICINE CLINIC | Facility: CLINIC | Age: 64
End: 2019-07-08

## 2019-07-08 DIAGNOSIS — Z86.718 PERSONAL HISTORY OF VENOUS THROMBOSIS AND EMBOLISM: Primary | ICD-10-CM

## 2019-07-08 DIAGNOSIS — Z79.01 WARFARIN ANTICOAGULATION: ICD-10-CM

## 2019-07-08 LAB
INR BLD: 1.9
PT POC: NORMAL SECONDS
VALID INTERNAL CONTROL?: YES

## 2019-07-08 NOTE — PROGRESS NOTES
Mr. Robert Hoffman is here today for anticoagulation monitoring for the diagnosis of DVT. His INR goal is 2.0-3.0 and his current Coumadin dose is 10 mg everyday except, 8 mg on fridays. Today's findings include an INR of 1.9 (normal INR range 0.8-1.2) . Considering Mr. Winters's past history, todays findings, and per the coumadin policy/protocol, Mr. Adam Jordan was instructed to take Coumadin as follows,  Same dose. He was also instructed to schedule an appointment in 2 weeks prior to leaving for an INR check. A full discussion of the nature of anticoagulants has been carried out. A full discussion of the need for frequent and regular monitoring, precise dosage adjustment and compliance was stressed. Side effects of potential bleeding were discussed and Mr. Adam Jordan was instructed to call [phone] if there are any signs of abnormal bleeding. Mr. Adam Jordan was instructed to avoid any OTC items containing aspirin or ibuprofen and prior to starting any new OTC products to consult with his physician or pharmacist to ensure no drug interactions are present. Mr. Adam Jordan was instructed to avoid any major changes in his general diet and to avoid alcohol consumption. .    ,  Mr. Adam Jordan verbalized his understanding of all instructions and will call the office with any questions, concerns, or signs of abnormal bleeding or blood clot.

## 2019-07-09 ENCOUNTER — HOSPITAL ENCOUNTER (OUTPATIENT)
Dept: PHYSICAL THERAPY | Age: 64
Discharge: HOME | End: 2019-07-09
Payer: MEDICARE

## 2019-07-09 PROCEDURE — 97110 THERAPEUTIC EXERCISES: CPT

## 2019-07-09 PROCEDURE — 97018 PARAFFIN BATH THERAPY: CPT

## 2019-07-09 NOTE — PROGRESS NOTES
OT DAILY TREATMENT NOTE 10-18    Patient Name: Renate Essex  Date:2019  : 1955  [x]  Patient  Verified  Payor: [place] MEDICARE / Plan: VA MEDICARE PART A & B / Product Type: Medicare /    In time:159  Out time:250  Total Treatment Time (min): 51  Visit #: 2 of 10    Medicare/BCBS Only   Total Timed Codes (min):  41 1:1 Treatment Time:  51     Treatment Area: Pain in right hand [M79.641]  Pain in left hand [M79.642]    SUBJECTIVE  Pain Level (0-10 scale): Right 3/10.  Left 2/10  Any medication changes, allergies to medications, adverse drug reactions, diagnosis change, or new procedure performed?: [x] No    [] Yes (see summary sheet for update)  Subjective functional status/changes:   [] No changes reported  New adaptive can opener making things a lot easier     OBJECTIVE    Modality rationale: decrease pain and increase tissue extensibility to improve the patients ability to grasp   Min Type Additional Details     [] Estim:  []Unatt       []IFC  []Premod                        []Other:  []w/ice   []w/heat  Position:  Location:     [] Estim: []Att    []TENS instruct  []NMES                    []Other:  []w/US   []w/ice   []w/heat  Position:  Location:     []  Traction: [] Cervical       []Lumbar                       [] Prone          []Supine                       []Intermittent   []Continuous Lbs:  [] before manual  [] after manual     []  Ultrasound: []Continuous   [] Pulsed                           []1MHz   []3MHz Location:  W/cm2:     []  Iontophoresis with dexamethasone         Location: [] Take home patch   [] In clinic     []  Ice     []  heat  []  Ice massage  []  Laser   []  Anodyne Position:  Location:   10 []  Laser with stim  []  Other: Position:  Location: B Hands     []  Vasopneumatic Device Pressure:       [] lo [] med [] hi   Temperature: [] lo [] med [] hi   [x] Skin assessment post-treatment:  [x]intact []redness- no adverse reaction  []redness - adverse reaction:     41 min Therapeutic Exercise:  [] See flow sheet :   Rationale: increase ROM and increase strength to improve the patients ability to     Towel Scrunch  b Hands 10x   Ligament Release Right  Palmar Stretch Right  Opposition with slide, B Hands 10x   \"L\" Stretch  B Hands 10x  Medium Putty: b Hands 10/10 each   Red Therabar: 3 ways 10x     With   [] TE   [] TA   [] neuro   [] other: Patient Education: [x] Review HEP    [] Progressed/Changed HEP based on:   [] positioning   [] body mechanics   [] transfers   [] heat/ice application   [] Splint wear/care   [] Sensory re-education   [] scar management      [] other:            Other Objective/Functional Measures:     Min Discomfort with Therabar exercise      Pain Level (0-10 scale) post treatment: Left 1/10, Right 1/10    ASSESSMENT/Changes in Function: Strength improving     Patient will continue to benefit from skilled OT services to modify and progress therapeutic interventions, address ROM deficits, address strength deficits and instruct in home and community integration to attain remaining goals. []  See Plan of Care  []  See progress note/recertification  []  See Discharge Summary         Progress towards goals / Updated goals:  Updated Goals to be accomplished in 4 weeks:  STGs continued:  2.  Patient will be independent in home program to increase thumb abduciton in right and both digit flexion. Met with HEP 7/9/19  3.  Patient will be introduced to joint protection strategies and adaptive equipment to improve self and home care and reduce pain. Progressing with in clinic tasks/Pt education 7/3/19  4.  Patient will be independent in home program for hand strengthening Medium Putty HEP administered on this date 7/3/19     LTGs  1.  Patient will report pain diminished to 0-2/10 with routine use   2.  Patient will increase  strength in both hand by 10 pounds to increase ease of opening jars Progressing with in clinic activities 7/3/19  3.  Patient will incorporate adaptive equipment and strategies to improve independence in self care tasks such as buttoning, cutting, writing etc.         PLAN  []  Upgrade activities as tolerated     [x]  Continue plan of care  []  Update interventions per flow sheet       []  Discharge due to:_  []  Other:_      ZENIA ZeeA/L 7/9/2019  11:17 AM    Future Appointments   Date Time Provider Dex [place]   7/9/2019  1:00 PM Naima Ellis, PTA MMCPTPB [facility]   7/9/2019  1:30 PM Freda Slaughter, PTA MMCPTPB [facility]   7/9/2019  2:00 PM Daiana Haider PYPPTGQ [facility]   7/10/2019  2:00 PM CARA Wang 2VV ROXANNE SCHED   7/12/2019  2:00 PM Daiana Robertso BTXOBWB [facility]   7/12/2019  3:00 PM Freda Slaughter, PTA MMCPTPB [facility]   7/12/2019  3:30 PM Naima Ellis, PTA MMCPTPB [facility]   7/15/2019  2:00 PM Freda Slaughter, PTA MMCPTPB [facility]   7/15/2019  2:30 PM Dank Sterling, PT MMCPTPB [facility]   7/15/2019  3:15 PM Daiana Robertso CNXIWVQ [facility]   7/17/2019  1:45 PM TEODORO Bah/L MMCPTPB [facility]   7/17/2019  2:30 PM Freda Slaughter, PTA MMCPTPB [facility]   7/17/2019  3:00 PM Dank Sterling, PT MMCPTPB [facility]   7/19/2019 10:30 AM Tennille Lombardi PA-C VSHV ROXANNE SCHED   7/22/2019  8:30 AM Alexis Blankenship  [address]   7/22/2019 10:15 AM Daianashobha Maloney AAPVGMJ [facility]   7/22/2019 11:00 AM Freda Slaughter, PTA MMCPTPB [facility]   7/22/2019 11:30 AM Dank Sterling, PT [id number] [facility]   7/22/2019  2:40 PM [facility] [address]   7/24/2019  2:00 PM Dank Sterling, PT [id number] [facility]   7/24/2019  2:30 PM Naima Ellis, PTA MMCPTPB [facility]   7/24/2019  3:15 PM Daiana Maloney RGTDAIL [facility]   7/29/2019 11:00 AM Daiana Maloney KZGQOSE [facility]   7/29/2019 12:00 PM Dank Sterling, PT MMCPTPB [facility]   7/29/2019 12:30 PM Dank Sterling, PT MMCPTPB [facility]   7/31/2019 10:30 AM Dank Sterling, PT XHYNQXQ [facility]   7/31/2019 11:00 AM Daiana Maloney RHRZRHE [facility]   7/31/2019 12:30 PM Devin Magallanes, PT MMCPTPB [facility] 9/11/2019 11:30 AM MD CARLY Angelo-REBECCA OLIVEIRA [facility]   10/2/2019  1:00 PM Given, Param Herrera MD [address] [facility]

## 2019-07-09 NOTE — PROGRESS NOTES
PT DAILY TREATMENT NOTE 10-18    Patient Name: Maria Esther Kendall  Date:2019  : 1955  [x]  Patient  Verified  Payor: VA MEDICARE / Plan: VA MEDICARE PART A & B / Product Type: Medicare /    In time: 12:58  Out time: 1:30  Total Treatment Time (min):32  Visit #: 7 of     Medicare/BCBS Only   Total Timed Codes (min):  32 1:1 Treatment Time: 32       Treatment Area: Presence of left artificial knee joint [Z96.652]  Iliotibial band syndrome, left leg [M76.32]    SUBJECTIVE  Pain Level (0-10 scale): 1/10   Any medication changes, allergies to medications, adverse drug reactions, diagnosis change, or new procedure performed?: [x] No    [] Yes (see summary sheet for update)  Subjective functional status/changes:   [] No changes reported  Pt reports not much pain just stiffness. He notes easier time getting up from chairs when using the band around his knees but still struggles after prolonged sitting >1 hour from stiffness. He notes less knots/pain spots down the side of his leg since working on loosening up the tightness. He is still waiting to get his stick massager in the mail.        OBJECTIVE      32 min Therapeutic Exercise:  [] See flow sheet :   Rationale: increase strength to improve the patients ability to perform ADLs      With   [] TE   [] TA   [] neuro   [] other: Patient Education: [x] Review HEP    [] Progressed/Changed HEP based on:   [] positioning   [] body mechanics   [] transfers   [] heat/ice application    [] other:      Other Objective/Functional Measures:   Educated to prevent locking knee in extension in standing  Challenged with eccentric step downs  Improved form with clams  Added multiple bridge exercises with hip abduction using BTB  No increased pain during session  Used 4\" box for foot clearance performing glute x 3    Pain Level (0-10 scale) post treatment: 1/10    ASSESSMENT/Changes in Function: Pt making progress with reducing trigger points along the lateral quad/ITB region. He continues to progress with glute strengthening with various exercises. He is challenged with transfers after prolonged sitting > 1 hour. Pt locks knee in extension in standing due to decreased TKE strength. He also struggles with stair descent due to decreased eccentric quad control. Will continue with strengthening and stretching to improve mobility for decreased pain/compensation. Progress towards goals / Updated goals:  Short term goals: To be accomplished within 1 week  1. Pt will be independent with HEP to maintain progression.  MET (6/17/19)   Long term goals: To be accomplished within 8 weeks  1. Pt will improve FOTO score by 10 points to 57/100 to show improvement with functional mobility performance. 2. Pt will have Left knee AROM improved to 0-115 degs at least to amb household and community with normal and safe pattern. Not met: 106 degrees (6/21/19)  3. Pt will have B hip abd, ext strength improved to at least 4/5 to be able to amb longer distance and navigate stair safely. Progressing with reps/resistance (7/9/19)  4.  Pt will report no more than 1-2/10 pain at worst so he can amb and perform ADLs with ease. Not met 3/10 (7/1/19) Progressing 1/10 today (7/9/19)      PLAN  [x]  Upgrade activities as tolerated     [x]  Continue plan of care  []  Update interventions per flow sheet       []  Discharge due to:_  []  Other:_      Breann Stewart PTA 7/9/2019  1:01 PM    Future Appointments   Date Time Provider Dex Hancockisti   7/9/2019  1:00 PM Roseline Lainez PTA MMCPTPB [facility]   7/9/2019  1:30 PM Jolie Nieves PTA MMCPTPB [facility]   7/9/2019  2:00 PM Marcia Gonzalez [id number] [facility]   7/10/2019  2:00 PM CARA Dugan 2VV ROXANNE SILVA   7/12/2019  2:00 PM Marcia Gonzalez RGIJBAM [facility]   7/12/2019  3:00 PM Jolie Nieves PTA MMCPTPB [facility]   7/12/2019  3:30 PM Roseline Lainez, PTA MMCPTPB [facility]   7/15/2019  2:00 PM Jolie Nieves PTA MMCPTPB [facility]   7/15/2019  2:30 PM Elpidio Re Penny, PT MMCPTPB [facility]   7/15/2019  3:15 PM Lorayne Countess ZPYEARZ [facility]   7/17/2019  1:45 PM Nan Coulter, OTR/L MMCPTPB [facility]   7/17/2019  2:30 PM Miguelina Ramirez, PTA MMCPTPB [facility]   7/17/2019  3:00 PM Mary Byrne, PT [id number] [facility]   7/19/2019 10:30 AM Janeen Simons PA-C [facility] ROXANNE SCHED   7/22/2019  8:30 AM Dimas Marti [facility]   7/22/2019 10:15 AM Lormarilee Roldan SIYDSMF [facility]   7/22/2019 11:00 AM Miguelina Ramirez, PTA MMCPTPB [facility]   7/22/2019 11:30 AM Mary Byrne, PT KRRQXCX [facility]   7/22/2019  2:40 PM [facility] NURSE [facility] ROXANNE SCHED   7/24/2019  2:00 PM Mary Byrne, PT QLHYQCY [facility]   7/24/2019  2:30 PM Milka Sin, PTA MMCPTPB [facility]   7/24/2019  3:15 PM Lorayne Countess [id number] [facility]   7/29/2019 11:00 AM Lorayne Countess [id number] [facility]   7/29/2019 12:00 PM Mary Byrne, PT MMCPTPB [facility]   7/29/2019 12:30 PM Mary Byrne, PT ENPMXWO [facility]   7/31/2019 10:30 AM Mary Byrne, PT BDOQZMB [facility]   7/31/2019 11:00 AM Adriannemarilee Roldan GLPTGLF [facility]   7/31/2019 12:30 PM Mary Byrne, PT MMCPTPB [facility]   9/11/2019 11:30 AM Edwige Ramires MD [facility]   10/2/2019  1:00 PM Amor, MD [facility]

## 2019-07-09 NOTE — PROGRESS NOTES
PT DAILY TREATMENT NOTE 10-18    Patient Name: Elida Rose  Date:2019  : 1955  [x]  Patient  Verified  Payor: VA MEDICARE / Plan: VA MEDICARE PART A & B / Product Type: Medicare /    In time:130  Out time:159  Total Treatment Time (min): 29  Visit #: 5 of     Medicare/BCBS Only   Total Timed Codes (min):  29 1:1 Treatment Time:  29       Treatment Area: Pain in left knee [M25.562]  Pain in right shoulder [M25.511]    SUBJECTIVE  Pain Level (0-10 scale): 1/10  Any medication changes, allergies to medications, adverse drug reactions, diagnosis change, or new procedure performed?: [x] No    [] Yes (see summary sheet for update)  Subjective functional status/changes:   [] No changes reported  Pt stated that the doctor said he could be done with sh therapy in 2 weeks    OBJECTIVE    29 min Therapeutic Exercise:  [x] See flow sheet :   Rationale: increase ROM and increase strength to improve the patients ability to increase ease with ADLs    With   [x] TE   [] TA   [] neuro   [] other: Patient Education: [x] Review HEP    [] Progressed/Changed HEP based on:   [] positioning   [] body mechanics   [] transfers   [] heat/ice application    [] other:      Other Objective/Functional Measures:   Had minimal difficulty with increased weight with wand exercises  TB sh exercises were challenging  No complaint of increased pain during session      Pain Level (0-10 scale) post treatment: 0/10    ASSESSMENT/Changes in Function:   Pt is progressing well with therapy and is to be discharged in 3 visits    Patient will continue to benefit from skilled PT services to modify and progress therapeutic interventions, address functional mobility deficits, address ROM deficits and address strength deficits to attain remaining goals.      []  See Plan of Care  [x]  See progress note/recertification  []  See Discharge Summary         Progress towards goals / Updated goals:  1. Patient will increase right shoulder strength to 4/5 to improve ease of daily tasks. Progressing 7/3  2. Patient will increase functional IR ROM to level of L4 to improve ease of dressing. Met 7/3  3. Patient will report little difficulty reaching self overhead to restore ease of ADL's.   Met 7/3     PLAN  []  Upgrade activities as tolerated     [x]  Continue plan of care  []  Update interventions per flow sheet       []  Discharge due to:_  []  Other:_      Javi Trent, BIRGIT 7/9/2019  1:23 PM    Future Appointments   Date Time Provider Dex [place]   7/9/2019  1:30 PM Marsha Bosworth, PTA MMCPTPB [facility]   7/9/2019  2:00 PM Pimentel Lobe LNBVOAH [facility]   7/10/2019  2:00 PM CARA Burch 2VV ROXANNE SCHED   7/12/2019  2:00 PM Pimentel Lobe BXASYXJ [facility]   7/12/2019  3:00 PM Marsha Bosworth, PTA MMCPTPB [facility]   7/12/2019  3:30 PM Nabor Novak PTA MMCPTPB [facility]   7/15/2019  2:00 PM Marsha Bosworth, PTA MMCPTPB [facility]   7/15/2019  2:30 PM Hondo Ser, PT PZSOCFG [facility]   7/15/2019  3:15 PM Pimentel Lobe CSSYVHG [facility]   7/17/2019  1:45 PM Susanne Arango, OTR/L MMCPTPB [facility]   7/17/2019  2:30 PM Marsha Bosworth, PTA MMCPTPB [facility]   7/17/2019  3:00 PM Wicho Ser, PT UGQDGBL [facility]   7/19/2019 10:30 AM Wyatt Mullins PA-C [facility] ROXANNE SCHED   7/22/2019  8:30 AM Luis Miguel Vincent  [address]   7/22/2019 10:15 AM Pimentel Lobe CZOSGNV [facility]   7/22/2019 11:00 AM Marsha Bosworth, PTA MMCPTPB [facility]   7/22/2019 11:30 AM Wicho Ser, PT KSFSMLA [facility]   7/22/2019  2:40 PM [facility] NURSE [address]   7/24/2019  2:00 PM Hondo Ser, PT OWWLMSG [facility]   7/24/2019  2:30 PM Nabor Novak PTA MMCPTPB [facility]   7/24/2019  3:15 PM Pimentel Lobe NDHQIEC [facility]   7/29/2019 11:00 AM Pimentel Lobe QWJZWZR [facility]   7/29/2019 12:00 PM Hondo Ser, PT MMCPTPB [facility]   7/29/2019 12:30 PM Wicho Ser, PT MMCPTPB [facility]   7/31/2019 10:30 AM Wicho Ser, PT MMCPTPB [facility]   7/31/2019 11:00 AM Pimentel Lobe PVADYWJ [address]   7/31/2019 12:30 PM Maty Helm, PT MMCPTPB [address]   9/11/2019 11:30 AM MD [facility] Arora   10/2/2019  1:00 PM Josephine Chinchilla MD [address]

## 2019-07-10 ENCOUNTER — OFFICE VISIT (OUTPATIENT)
Dept: VASCULAR SURGERY | Age: 64
End: 2019-07-10

## 2019-07-10 VITALS
RESPIRATION RATE: 19 BRPM | SYSTOLIC BLOOD PRESSURE: 144 MMHG | HEART RATE: 90 BPM | HEIGHT: 70 IN | WEIGHT: 230 LBS | DIASTOLIC BLOOD PRESSURE: 86 MMHG | BODY MASS INDEX: 32.93 KG/M2

## 2019-07-10 DIAGNOSIS — I87.2 VENOUS REFLUX: ICD-10-CM

## 2019-07-10 DIAGNOSIS — Z86.718 HISTORY OF DVT OF LOWER EXTREMITY: Primary | ICD-10-CM

## 2019-07-10 DIAGNOSIS — R60.0 EDEMA OF EXTREMITIES: ICD-10-CM

## 2019-07-10 NOTE — PROGRESS NOTES
Mr Susan Nash was in recently with Dr Bri Lester with complaints of bilateral lower extremity edema. He has a history of hypercoagulable disorder. He is on lifelong warfarin for this. He has recently had prostate surgery, left total knee replacement, and right shoulder surgery. He does not have any infections or complications he tells me. He was used Lovenox and warfarin throughout these procedures. He had a recent DVT study that was negative. He was sent for an ultrasound to evaluate his status of reflux but it was discussed with him at length that stockings and elevation may be his best therapy for the swelling. He has been compliant with this regimen as well as elevation   His swelling is mostly resolved other than some minimal right ankle edema    I did review his results, as follows: No DVT demonstrate a bilateral lower extremity. Chronic nonocclusive thrombus present in the right small saphenous vein. Right posterior tibial artery triphasic waveforms. Deep venous insufficiency notable right common femoral and tibial vein. Right greater saphenous vein shows reflux at the mid thigh 0.8 seconds. Right small saphenous vein shows venous reflux at the saphenous popliteal junction and 3.2 seconds. Right small saphenous vein dilated to 0.6 cm. Chronic nonocclusive thrombus present at the left small saphenous vein. Left posterior tibial artery triphasic. Left common femoral popliteal and peroneal vein show venous reflux. Left greater saphenous vein demonstrates reflux up to 3 seconds at the knee and below the knee. Left small saphenous vein have 4.4 seconds of reflux at the saphenous popliteal junction    In summary, he could be a candidate for bilateral small saphenous vein ablations. I told him outcomes would be unpredictable because of the fact that he has deep vein reflux as well. But it could offer him some relief from the edema.   However, since he said his edema is actually improved, and he is doing well with the compliance with stockings, probably would not consider procedure at this point. It certainly is an option for discussion if he started to have worsening symptoms again. I think he probably just had a spell with increased sedentary lifestyle with some component of dependent edema when this flared up between all of his surgeries that he had. He said he has been more active and doing therapy his symptoms have improved.   I reinforced continuing with range of motion exercises, periodic elevation, and the compression modalities and return if symptoms worsen for consideration of ablation    This was a lengthy discussion today, approx 20-25 minutes

## 2019-07-10 NOTE — PROGRESS NOTES
1. Have you been to an emergency room or urgent care clinic since your last visit? May     Hospitalized since your last visit? If yes, where, when, and reason for visit? no  2. Have you seen or consulted any other health care providers outside of the [facility] since your last visit including any procedures, health maintenance items.  If yes, where, when and reason for visit? no

## 2019-07-12 ENCOUNTER — HOSPITAL ENCOUNTER (OUTPATIENT)
Dept: PHYSICAL THERAPY | Age: 64
Discharge: HOME | End: 2019-07-12
Payer: MEDICARE

## 2019-07-12 PROCEDURE — 97110 THERAPEUTIC EXERCISES: CPT

## 2019-07-12 PROCEDURE — 97530 THERAPEUTIC ACTIVITIES: CPT

## 2019-07-12 PROCEDURE — 97018 PARAFFIN BATH THERAPY: CPT

## 2019-07-12 NOTE — PROGRESS NOTES
[facility] [employer]  [address] [facility]  [phone] fax    Medicare Progress Report    Patient name: Sarahi Gordon Start of Care: 2019   Referral source: Mary [place], [place] : 1955   Medical/Treatment Diagnosis: Presence of left artificial knee joint [Z96.652]  Iliotibial band syndrome, left leg [M76.32]  Payor: VA MEDICARE / Plan: VA MEDICARE PART A & B / Product Type: Medicare /  Onset Date:about 6 months ago                Prior Hospitalization: see medical history Provider#: [id number]   Medications: Verified on Patient Summary List     Comorbidities: arthritis, HTN, Left TKR 3-2018, prostate cancer surgery  Prior Level of Function: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 He notes using the stick massager at home. He has less knots along the side of his thigh. He reports most difficulty is getting up after prolonged sitting which he gets pain on the side of his knees. He got new orthotics but hasn't put them in his shoes yet. Current Status/ treatment goals Objective measures   1. Pt will improve FOTO score by 10 points to 57/100 to show improvement with functional mobility performance. [] met                 [] not met  [x] progressing  3 pt improvement    2. Pt will have Left knee AROM improved to 0-115 degs at least to amb household and community with normal and safe pattern. [x] met                 [] not met  [] progressing 0-116 degrees   3. Pt will have B hip abd, ext strength improved to at least 4/5 to be able to amb longer distance and navigate stair safely.        [] met                 [] not met  [x] progressing Hip abduction MMT: left 4/5 right 4-/5; Hip extension MMT: left 4-/5 right 3+/5        4. Pt will report no more than 1-2/10 pain at worst so he can amb and perform ADLs with ease       [] met                 [] not met  [x] progressing Average 3-4/10 pain     Key functional changes:   Improved ease of ambulation, improved understanding/education on cause of pain, improved transfers    Problems/ barriers to goal attainment: none     Assessment / Recommendations:Mr. Marlon Parisi is progressing in therapy with improved FOTO score, improved left knee AROM and improving strength. During sit to stands he continues to have genu valgus due to pes planus and weak hips causing lateral knee tightness/pain. He has improved awareness of correct glute activation to prevent TFL/ITB compensation but still needs some cueing to perform correctly. He has improved ease of ambulation with decreased tightness but continues to have most difficulty with sit to stand transfers after prolonged sitting. Will continue to work on improving knee alignment with hip and arch strengthening to assist with stairs and transfers with decreased pain. Problem List: pain affecting function, decrease ROM, decrease strength, decrease ADL/ functional abilitiies, decrease activity tolerance and decrease flexibility/ joint mobility   Treatment Plan: Therapeutic exercise, Therapeutic activities, Neuromuscular re-education, Physical agent/modality, Manual therapy and Patient education    Patient Goal (s) has been updated and includes: \"no buckling on the stairs\"     Updated Goals to be accomplished in 8 treatments:  1. Pt will improve FOTO score by 10 points to 57/100 to show improvement with functional mobility performance. 2.  Pt will have B hip abd, ext strength improved to at least 4/5 to be able to amb longer distance and navigate stair safely.   3. Pt will report 60% improvement in knee pain to aide in sit to stand transfers after prolonged sitting.   4. Pt will be independent with an updated HEP to continue self progression at home and pain management upon D/C.             Frequency / Duration: Patient to be seen 2 times per week for 8 treatments:      Milo Dumont PT 7/12/2019 5:40 PM

## 2019-07-12 NOTE — PROGRESS NOTES
PT DAILY TREATMENT NOTE 10-18    Patient Name: Jose Clarke  Date:2019  : 1955  [x]  Patient  Verified  Payor: VA MEDICARE / Plan: VA MEDICARE PART A & B / Product Type: Medicare /    In time:3:26   Out time: 4:13   Total Treatment Time (min):47  Visit #: 8 of 16    Medicare/BCBS Only   Total Timed Codes (min):  47 1:1 Treatment Time: 52       Treatment Area: Presence of left artificial knee joint [Z96.652]  Iliotibial band syndrome, left leg [M76.32]    SUBJECTIVE  Pain Level (0-10 scale):  3/10  Any medication changes, allergies to medications, adverse drug reactions, diagnosis change, or new procedure performed?: [x] No    [] Yes (see summary sheet for update)  Subjective functional status/changes:   [] No changes reported  Pt reports he is feeling better when he walks and looser. He notes using the stick massager at home. He has less knots along the side of his thigh. He reports most difficulty is getting up after prolonged sitting which he gets pain on the side of his knees. He got new orthotics but hasn't put them in his shoes yet. OBJECTIVE      45 min Therapeutic Exercise:  [] See flow sheet :   Rationale: increase strength to improve the patients ability to perform ADLs    2 minutes of manual to perform inhibition KT taping to left ITB for decreased tension with sit to stand transfers      With   [] TE   [] TA   [] neuro   [] other: Patient Education: [x] Review HEP    [] Progressed/Changed HEP based on:   [] positioning   [] body mechanics   [] transfers   [] heat/ice application    [] other:      Other Objective/Functional Measures:    FOTO: 50  Left knee AROM: 0-116  Hip abduction MMT: left 4/5 right 4-/5  Hip extension MMT: left 4-/5 right 3+/5  B pes planus  Educated on short foot exercise to perform with sit to stands and during eccentric step downs  Trial of KT to inhibit Left ITB  Cues to prevent TFL compensation with hip abduction    Pain Level (0-10 scale) post treatment: 1-2/10    ASSESSMENT/Changes in Function:  See Progress Note. Progress towards goals / Updated goals:  Short term goals: To be accomplished within 1 week  1. Pt will be independent with HEP to maintain progression.  MET (6/17/19)   Long term goals: To be accomplished within 8 weeks  1. Pt will improve FOTO score by 10 points to 57/100 to show improvement with functional mobility performance. Progressed 3 points (7/12/19)  2. Pt will have Left knee AROM improved to 0-115 degs at least to amb household and community with normal and safe pattern. Not met: 106 degrees (6/21/19) MET 0-116 degrees (7/12/19)    3. Pt will have B hip abd, ext strength improved to at least 4/5 to be able to amb longer distance and navigate stair safely. Progressing with reps/resistance (7/9/19) Hip abduction MMT: left 4/5 right 4-/5; Hip extension MMT: left 4-/5 right 3+/5 (7/12/19)  4.  Pt will report no more than 1-2/10 pain at worst so he can amb and perform ADLs with ease. Not met 3/10 (7/1/19) Progressing 1/10 today (7/9/19) average 3-4/10 (7/12/19)      PLAN  [x]  Upgrade activities as tolerated     [x]  Continue plan of care  []  Update interventions per flow sheet       []  Discharge due to:_  []  Other:_      Michael Hill PTA 7/12/2019  1:01 PM    Future Appointments   Date Time Provider Dex Hatfield   7/12/2019  2:00 PM Lubna Roldan IOPZCKF [facility]   7/12/2019  3:00 PM Miguelina Ramirez, PTA MMCPTPB [facility]   7/12/2019  3:30 PM Milka Sin PTA MMCPTPB [facility]   7/15/2019  2:00 PM Miguelina Ramirez PTA MMCPTPB [facility]   7/15/2019  2:30 PM Mary Byrne, PT [id number] [facility]   7/15/2019  3:15 PM Lubna Roldan QHLFWZP [facility]   7/17/2019  1:45 PM Nan Coulter, OTR/L MMCPTPB [facility]   7/17/2019  2:30 PM Miguelina Ramirez, PTA MMCPTPB [facility]   7/17/2019  3:00 PM Mary Byrne, PT MMCPTPB [facility]   7/19/2019 10:30 AM ESPERANZA [address]   7/22/2019  8:30 AM Dimas Marti  [address] 7/22/2019 10:15 AM Indigo Suarez [id number] [facility]   7/22/2019 11:00 AM Kaz Richardson, PTA MMCPTPB [facility]   7/22/2019 11:30 AM Sunitha Whiting, PT BWNJODE [facility]   7/22/2019  2:40 PM [facility] NURSE [facility]   7/24/2019  2:00 PM Sunitha Whiting, PT ZPJSUMP [facility]   7/24/2019  2:30 PM France Núñez, PTA MMCPTPB [facility]   7/24/2019  3:15 PM Indigo Suarez IIMOPHM [facility]   7/29/2019 11:00 AM Indigo Suarez SFSTYLO [facility]   7/29/2019 12:00 PM Sunitha Whiting, PT MMCPTPB [facility]   7/29/2019 12:30 PM Sunitha Whiting, PT BOHZVYN [facility]   7/31/2019 10:30 AM Sunitha Whiting, PT JGSFBND [facility]   7/31/2019 11:00 AM Indigo Suarez KEUGKXV [facility]   7/31/2019 12:30 PM Sunitha Whiting, PT MMCPTPB [facility]   9/11/2019 11:30 AM Davian Sanchez MD [facility]   10/2/2019  1:00 PM Tiffanie Chinchilla MD [id number] [facility]

## 2019-07-12 NOTE — PROGRESS NOTES
[facility] [place]  [address] [facility]  [phone] fax    Continued Plan of Care/ Re-certification for Physical Therapy Services    Patient name: Reji Winters Start of Care: 4/16/2019   Referral Ezekiel Mejia MD NOP: 9/26/0264               Medical Diagnosis: Other specified postprocedural states [Z98.890]  Presence of left artificial knee joint [Z96.652]  Payor: [place] MEDICARE / Plan: VA MEDICARE PART A & B / Product Type: Medicare /  Onset Date:3/29/2019               Treatment Diagnosis: right shoulder pain and left knee pain   Prior Hospitalization: see medical history Provider#: [id number]   Medications: Verified on Patient summary List    Comorbidities: arthritis, high blood pressure, prostate ca s/p sx, left TKA   Prior Level of Function: 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 Status at last note/certification:progressing  Current Status: not met    Goal:Patient will increase functional IR ROM to level of L4 to improve ease of dressing. Status at last note/certification: sacrum   Current Status: met    Goal:Patient will report little difficulty reaching self overhead to restore ease of ADL's.   Status at last note/certification: a lot difficulty  Current Status: met    Key functional changes: improved AROM, improved functional mobility       Problems/ barriers to goal attainment: none     Problem List: decrease ROM, decrease strength, decrease ADL/ functional abilitiies and decrease flexibility/ joint mobility    Treatment Plan: Therapeutic exercise, Therapeutic activities, Neuromuscular re-education, Physical agent/modality, Manual therapy and Patient education     Patient Goal (s) has been updated and includes: continue strengthening shoulder     Goals for this certification period to be accomplished in 4 treatments:  1. Patient will increase right shoulder strength to 4/5 to improve ease of daily tasks. 2. Patient will demonstrate good understanding of final HEP to continue strengthening independently when discharged from physical therapy. Frequency / Duration: Patient to be seen 2-3 times per week for 4 treatments:    Assessment / Recommendations:Patient is making steady progress towards finals goals. He has met 2/3 goals. He is able to reach to the level of L4, which demonstrates improved functional IR AROM. Pt reports only a little difficulty reaching a shelf overhead and no difficulty reaching a shelf at shoulder height. He continues to be challenged with theraband shoulder strengthening exercises. Pt will benefit from  for 4 more sessions to continue to address strength deficits and progress to final HEP. Certification Period: 7/9/2019 to 8/7/2019    Lucero Alcantara, PT 7/12/2019 5:53 PM    ________________________________________________________________________  I certify that the above Therapy Services are being furnished while the patient is under my care. I agree with the treatment plan and certify that this therapy is necessary. [] I have read the above and request that my patient continue as recommended.   [] I have read the above report and request that my patient continue therapy with the following changes/special instructions: _______________________________________  [] I have read the above report and request that my patient be discharged from therapy    Physician's Signature:____________Date:_________TIME:________    ** Signature, Date and Time must be completed for valid certification **    Please sign and return to [facility] OF [address]  [phone] fax

## 2019-07-12 NOTE — PROGRESS NOTES
OT DAILY TREATMENT NOTE 10-18    Patient Name: Luis Pete  Date:2019  : 1955  [x]  Patient  Verified  Payor: VA MEDICARE / Plan: VA MEDICARE PART A & B / Product Type: Medicare /    In time:200  Out time:248  Total Treatment Time (min): 48  Visit #: 3 of 10    Medicare/BCBS Only   Total Timed Codes (min):  38 1:1 Treatment Time:  48     Treatment Area: Pain in right hand [M79.641]  Pain in left hand [M79.642]    SUBJECTIVE  Pain Level (0-10 scale): Right 2/10, Left 1/10  Any medication changes, allergies to medications, adverse drug reactions, diagnosis change, or new procedure performed?: [x] No    [] Yes (see summary sheet for update)  Subjective functional status/changes:   [] No changes reported  Pt reports using BUE to spread mulch yesterday with no increase in overall pain in hands.     OBJECTIVE    Modality rationale: decrease pain and increase tissue extensibility to improve the patients ability to grasp   Min Type Additional Details     [] Estim:  []Unatt       []IFC  []Premod                        []Other:  []w/ice   []w/heat  Position:  Location:     [] Estim: []Att    []TENS instruct  []NMES                    []Other:  []w/US   []w/ice   []w/heat  Position:  Location:     []  Traction: [] Cervical       []Lumbar                       [] Prone          []Supine                       []Intermittent   []Continuous Lbs:  [] before manual  [] after manual     []  Ultrasound: []Continuous   [] Pulsed                           []1MHz   []3MHz Location:  W/cm2:     []  Iontophoresis with dexamethasone         Location: [] Take home patch   [] In clinic     []  Ice     []  heat  []  Ice massage  []  Laser   []  Anodyne Position:  Location:   10 []  Laser with stim  []  Other: Position:  Location: B Hands     []  Vasopneumatic Device Pressure:       [] lo [] med [] hi   Temperature: [] lo [] med [] hi   [x] Skin assessment post-treatment:  [x]intact []redness- no adverse reaction  []redness Marylee Cantor reaction:      26 min Therapeutic Exercise:  [] See flow sheet :   Rationale: increase ROM and increase strength to improve the patients ability to , grasp     Towel Scrunches  10x BUE  Opposition with Slide , B Hands  Med Putty: 10/10 with each hand individually     12 min Therapeutic Activity:  []  See flow sheet :   Rationale: joint protection techniques   to improve the patients ability to participate in ADL/IADL's with decreased pain/increased independence     Joint Protection Handout Administered and Reviewed  Education on Hand Care tips/techniques   Education on Ergonomics with Gardening     With   [] TE   [] TA   [] neuro   [] other: Patient Education: [x] Review HEP    [] Progressed/Changed HEP based on:   [] positioning   [] body mechanics   [] transfers   [] heat/ice application   [] Splint wear/care   [] Sensory re-education   [] scar management      [] other:            Other Objective/Functional Measures:     Increased time required with Left hand for 1/4 in peg removal     Pain Level (0-10 scale) post treatment: Right 2/10, Left 1/10     ASSESSMENT/Changes in Function: ROM/Strength improving     Patient will continue to benefit from skilled OT services to modify and progress therapeutic interventions, address ROM deficits, address strength deficits and instruct in home and community integration to attain remaining goals. []  See Plan of Care  []  See progress note/recertification  []  See Discharge Summary         Progress towards goals / Updated goals:  Updated Goals to be accomplished in 4 weeks:  STGs continued:  2.  Patient will be independent in home program to increase thumb abduciton in right and both digit flexion. Met with HEP 7/9/19  3.  Patient will be introduced to joint protection strategies and adaptive equipment to improve self and home care and reduce pain. Progressing with in clinic tasks/Pt education 7/3/19  4.  Patient will be independent in home program for hand strengthening Medium Putty HEP administered on this date 7/3/19     LTGs   1.  Patient will report pain diminished to 0-2/10 with routine use   2.  Patient will increase  strength in both hand by 10 pounds to increase ease of opening jars Progressing with in clinic activities 7/3/19  3.  Patient will incorporate adaptive equipment and strategies to improve independence in self care tasks such as buttoning, cutting, writing etc. Education on Joint protective strategies/Techniques given on this date 7/12/19    PLAN  []  Upgrade activities as tolerated     [x]  Continue plan of care  []  Update interventions per flow sheet       []  Discharge due to:_  []  Other:_      WES Parker/L 7/12/2019  8:55 AM    Future Appointments   Date Time Provider Dex [place]   7/12/2019  2:00 PM Kenard Habermann OHYVPYD [facility]   7/12/2019  3:00 PM Cassi Sotomayor, PTA MMCPTPB [facility]   7/12/2019  3:30 PM Baron Nolen, PTA MMCPTPB [facility]   7/15/2019  2:00 PM Cassi Sotomayor, PTA MMCPTPB [facility]   7/15/2019  2:30 PM Valorie Iqbal, PT XGSANGA [facility]   7/15/2019  3:15 PM Kenard Habermann VMORVVC [facility]   7/17/2019  1:45 PM TEODORO Castro/L MMCPTPB [facility]   7/17/2019  2:30 PM Cassi Sotomayor, PTA MMCPTPB [facility]   7/17/2019  3:00 PM Valorie Iqbal, PT MMCPTPB [facility]   7/19/2019 10:30 AM Wende Siemens, PA-C VS ROXANNE SCHED   7/22/2019  8:30 AM Mihai Morfin  [address]   7/22/2019 10:15 AM Kenard Habermann CXRBEGZ [facility]   7/22/2019 11:00 AM Cassi Sotomayor, PTA MMCPTPB [facility]   7/22/2019 11:30 AM Valorie Iqbal, PT RANULFO [facility]   7/22/2019  2:40 PM [facility] NURSE [place] [id number]   7/24/2019  2:00 PM Valorie Iqbal, PT ODDBFUS [facility]   7/24/2019  2:30 PM Baron Nolen, PTA MMCPTPB [facility]   7/24/2019  3:15 PM Kenard Habermann VADUWZQ [facility]   7/29/2019 11:00 AM Kenard Habermann VKODPMM [facility]   7/29/2019 12:00 PM Valorie Iqbal, PT MMCPTPB [facility]   7/29/2019 12:30 PM Shellie Magallanes, PT MMCPTPB [facility] 7/31/2019 10:30 AM Clarke Marinelli, PT QCATYKC [facility]   7/31/2019 11:00 AM Perla Seip AHQXMXB [facility]   7/31/2019 12:30 PM Clarke Marinelli, PT MMCPTPB [facility]   9/11/2019 11:30 AM Donita Anthony MD [facility]   10/2/2019  1:00 PM [facility]

## 2019-07-12 NOTE — PROGRESS NOTES
PT DAILY TREATMENT NOTE 10-18    Patient Name: Rosa Escobar  Date:2019  : 1955  [x]  Patient  Verified  Payor: VA MEDICARE / Plan: VA MEDICARE PART A & B / Product Type: Medicare /    In time:255  Out time:325  Total Treatment Time (min): 30  Visit #: 6 of     Medicare/BCBS Only   Total Timed Codes (min):  30 1:1 Treatment Time:  30       Treatment Area: Pain in left knee [M25.562]  Pain in right shoulder [M25.511]    SUBJECTIVE  Pain Level (0-10 scale): 0/10  Any medication changes, allergies to medications, adverse drug reactions, diagnosis change, or new procedure performed?: [x] No    [] Yes (see summary sheet for update)  Subjective functional status/changes:   [] No changes reported  Pt stated that his sh is doing much better. He reported that he is able to reach up into the cabinets now    OBJECTIVE    30 min Therapeutic Exercise:  [x] See flow sheet :   Rationale: increase ROM and increase strength to improve the patients ability to increase ease with ADLs    With   [x] TE   [] TA   [] neuro   [] other: Patient Education: [x] Review HEP    [] Progressed/Changed HEP based on:   [] positioning   [] body mechanics   [] transfers   [] heat/ice application    [] other:      Other Objective/Functional Measures:   Was challenged with increased weight for the wall wipes  No complaint of pain during session   Range of motion with wand is improving     Pain Level (0-10 scale) post treatment: 0/10    ASSESSMENT/Changes in Function:   Pt is progressing well toward goals and is to be discharged in 2 visits with updated HEP    Patient will continue to benefit from skilled PT services to modify and progress therapeutic interventions, address functional mobility deficits, address ROM deficits and address strength deficits to attain remaining goals.      []  See Plan of Care  [x]  See progress note/recertification  []  See Discharge Summary         Progress towards goals / Updated goals:  1. Patient will increase right shoulder strength to 4/5 to improve ease of daily tasks. Progressing 7/12/19  2. Patient will increase functional IR ROM to level of L4 to improve ease of dressing. Met 7/3  3. Patient will report little difficulty reaching self overhead to restore ease of ADL's.   Met 7/3     PLAN  []  Upgrade activities as tolerated     [x]  Continue plan of care  []  Update interventions per flow sheet       []  Discharge due to:_  []  Other:_      Gina Crow PTA 7/12/2019  2:51 PM    Future Appointments   Date Time Provider Dxe Hat[place]   7/12/2019  3:00 PM Dominic Alcaraz PTA MMCPTPB [facility]   7/12/2019  3:30 PM Harley Mckeon PTA MMCPTPB [facility]   7/15/2019  2:00 PM Dominic Alcaraz PTA MMCPTPB [facility]   7/15/2019  2:30 PM Kellie Leong, PT KAAMBZB [facility]   7/15/2019  3:15 PM Milly Joseph LXINWVW [facility]   7/17/2019  1:45 PM Janora Schwab, OTR/L MMCPTPB [facility]   7/17/2019  2:30 PM Dominic Alcaraz PTA MMCPTPB [facility]   7/17/2019  3:00 PM Kellie Leong, PT QOVRNFA [facility]   7/19/2019 10:30 AM Beryle Browns, PA-C VSHV ROXANNE SCHED   7/22/2019  8:30 AM Camila Herrera  [address]   7/22/2019 10:15 AM Milly Joseph MYOCASQ [facility]   7/22/2019 11:00 AM Dominic Alcaraz PTA MMCPTPB [facility]   7/22/2019 11:30 AM Kellie Leong, PT TZJVUSC [facility]   7/22/2019  2:40 PM [facility] NURSE [facility] [address]   7/24/2019  2:00 PM Kellie Leong, PT XCUZPZJ [facility]   7/24/2019  2:30 PM Harley Mckeon, PTA MMCPTPB [facility]   7/24/2019  3:15 PM Millykely Joseph XWGELVX [facility]   7/29/2019 11:00 AM Millykely Joseph AQWCPIT [facility]   7/29/2019 12:00 PM Kellie Leong, PT MMCPTPB [facility]   7/29/2019 12:30 PM Kellie Leong, PT ZZAGEDT [facility]   7/31/2019 10:30 AM Kellie Leong, PT HLZJKFR [facility]   7/31/2019 11:00 AM Milly Opal SWJITKW [facility]   7/31/2019 12:30 PM Kellie Leong, PT MMCPTPB [facility]   9/11/2019 11:30 AM [facility]   10/2/2019  1:00 PM Tom Chinchilla MD [address] [place] [address] [facility]

## 2019-07-13 ENCOUNTER — APPOINTMENT (OUTPATIENT)
Dept: PHYSICAL THERAPY | Age: 64
End: 2019-07-13
Payer: MEDICARE

## 2019-07-15 ENCOUNTER — HOSPITAL ENCOUNTER (OUTPATIENT)
Dept: PHYSICAL THERAPY | Age: 64
Discharge: HOME | End: 2019-07-15
Payer: MEDICARE

## 2019-07-15 PROCEDURE — 97018 PARAFFIN BATH THERAPY: CPT

## 2019-07-15 PROCEDURE — 97110 THERAPEUTIC EXERCISES: CPT

## 2019-07-15 NOTE — PROGRESS NOTES
eOT DAILY TREATMENT NOTE 10-18    Patient Name: Ginette Andujar  Date:7/15/2019  : 1955  [x]  Patient  Verified  Payor: VA MEDICARE / Plan: VA MEDICARE PART A & B / Product Type: Medicare /    In time:310  Out time:347   Total Treatment Time (min): 37  Visit #: 4 of 10    Medicare/BCBS Only   Total Timed Codes (min):  27 1:1 Treatment Time:  37     Treatment Area: Pain in right hand [M79.641]  Pain in left hand [M79.642]    SUBJECTIVE  Pain Level (0-10 scale): B Hands 2/10  Any medication changes, allergies to medications, adverse drug reactions, diagnosis change, or new procedure performed?: [x] No    [] Yes (see summary sheet for update)  Subjective functional status/changes:   [] No changes reported  Pt reports slight increase in pain in left hand due to using it to water plants    OBJECTIVE             Modality rationale: decrease pain and increase tissue extensibility to improve the patients ability to grasp   Min Type Additional Details     [] Estim:  []Unatt       []IFC  []Premod                        []Other:  []w/ice   []w/heat  Position:  Location:     [] Estim: []Att    []TENS instruct  []NMES                    []Other:  []w/US   []w/ice   []w/heat  Position:  Location:     []  Traction: [] Cervical       []Lumbar                       [] Prone          []Supine                       []Intermittent   []Continuous Lbs:  [] before manual  [] after manual     []  Ultrasound: []Continuous   [] Pulsed                           []1MHz   []3MHz Location:  W/cm2:     []  Iontophoresis with dexamethasone         Location: [] Take home patch   [] In clinic     []  Ice     []  heat  []  Ice massage  []  Laser   []  Anodyne Position:  Location:   10 []  Laser with stim  []  Other: Position:  Location: B Hands     []  Vasopneumatic Device Pressure:       [] lo [] med [] hi   Temperature: [] lo [] med [] hi   [x] Skin assessment post-treatment:  [x]intact []redness- no adverse reaction  []redness -Breonna Comas reaction:       27 min Therapeutic Exercise:  [] See flow sheet :   Rationale: increase ROM and increase strength to improve the patients ability to , grasp    Towel scrunch 10x B hands   Medium Putty: 10/10 each hand   Red Therabar: 3 ways 10x     With   [] TE   [] TA   [] neuro   [] other: Patient Education: [x] Review HEP    [] Progressed/Changed HEP based on:   [] positioning   [] body mechanics   [] transfers   [] heat/ice application   [] Splint wear/care   [] Sensory re-education   [] scar management      [] other:            Other Objective/Functional Measures:     Cueing for hold with therabar     Pain Level (0-10 scale) post treatment: 1/10    ASSESSMENT/Changes in Function: ROM/Strength     Patient will continue to benefit from skilled OT services to modify and progress therapeutic interventions, address ROM deficits, address strength deficits and instruct in home and community integration to attain remaining goals. []  See Plan of Care  []  See progress note/recertification  []  See Discharge Summary         Progress towards goals / Updated goals:  Updated Goals to be accomplished in 4 weeks:  STGs continued:  2.  Patient will be independent in home program to increase thumb abduciton in right and both digit flexion. Met with HEP 7/9/19  3.  Patient will be introduced to joint protection strategies and adaptive equipment to improve self and home care and reduce pain. Progressing with in clinic tasks/Pt education 7/3/19  4.  Patient will be independent in home program for hand strengthening Medium Putty HEP administered on this date 7/3/19     LTGs   1.  Patient will report pain diminished to 0-2/10 with routine use Progressing 7/15/19  2.  Patient will increase  strength in both hand by 10 pounds to increase ease of opening jars Progressing with in clinic activities 7/15/19  3.  Patient will incorporate adaptive equipment and strategies to improve independence in self care tasks such as buttoning, cutting, writing etc. Education on Joint protective strategies/Techniques given on this date 7/12/19        PLAN  []  Upgrade activities as tolerated     [x]  Continue plan of care  []  Update interventions per flow sheet       []  Discharge due to:_  []  Other:_      Jessica VasquezHARVEY/L 7/15/2019  1:05 PM    Future Appointments   Date Time Provider Dex [place]   7/15/2019  2:00 PM Kaz Richardson, PTA MMCPTPB [address]   7/15/2019  2:30 PM Sunitha Whiting, PT COPPRFZ [address]   7/15/2019  3:15 PM Indigo Suarez FDLWSBF [address]   7/17/2019  1:45 PM Everton Santacruz OTR/L MMCPTPB [address]   7/17/2019  2:30 PM Kaz Richardson, PTA MMCPTPB [address]   7/17/2019  3:00 PM Sunitha Whiting, PT NMAYNEG [address]   7/19/2019 10:30 AM Najma Hernandez PA-C [facility]   7/22/2019  8:30 AM Judson Hernandez  [address]   7/22/2019  2:40 PM [facility] NURSE [address]   7/24/2019  2:30 PM France Núñez PTA MMCPTPB [address]   7/24/2019  3:15 PM Indigo Suarez TJEWZPM [address]   7/25/2019 10:00 AM Sunitha Whiting PT MMCPTPB [address]   7/25/2019 11:15 AM Everton Santacruz, OTR/L MMCPTPB [address]   7/29/2019 11:00 AM Indigo Suarez VPEGSTT [address]   7/29/2019 12:30 PM Sunitha Whiting PT MMCPTPB [address]   7/31/2019 11:00 AM Indigo Suarez ECZFLAG [address]   7/31/2019 12:30 PM Sunitha Whiting PT MMCPTPB [address]   9/11/2019 11:30 AM MD CARLY Rogers-REBECCA OLIVEIRA [facility]   10/2/2019  1:00 PM Given, Tiffanie Nguyen MD [address]

## 2019-07-15 NOTE — PROGRESS NOTES
PT DAILY TREATMENT NOTE 10-18    Patient Name: Mejia Benedict  Date:7/15/2019  : 1955  [x]  Patient  Verified  Payor: [place] MEDICARE / Plan: VA MEDICARE PART A & B / Product Type: Medicare /    In time:203  Out time:230  Total Treatment Time (min): 27  Visit #: 7 of     Medicare/BCBS Only   Total Timed Codes (min):  27 1:1 Treatment Time:  27       Treatment Area: Pain in left knee [M25.562]  Pain in right shoulder [M25.511]    SUBJECTIVE  Pain Level (0-10 scale): 1/10  Any medication changes, allergies to medications, adverse drug reactions, diagnosis change, or new procedure performed?: [x] No    [] Yes (see summary sheet for update)  Subjective functional status/changes:   [] No changes reported  Pt stated that he is not having any difficulty with his sh    OBJECTIVE    27 min Therapeutic Exercise:  [x] See flow sheet :   Rationale: increase ROM and increase strength to improve the patients ability to increase ease with ADLs    With   [x] TE   [] TA   [] neuro   [] other: Patient Education: [x] Review HEP    [] Progressed/Changed HEP based on:   [] positioning   [] body mechanics   [] transfers   [] heat/ice application    [] other:      Other Objective/Functional Measures:   Was challenged with changes made today  No complaint of increased sh pain during session      Pain Level (0-10 scale) post treatment: 0/10    ASSESSMENT/Changes in Function:   Pt is progressing well and is to be discharged next visit    Patient will continue to benefit from skilled PT services to modify and progress therapeutic interventions, address functional mobility deficits, address ROM deficits and address strength deficits to attain remaining goals. []  See Plan of Care  [x]  See progress note/recertification  []  See Discharge Summary         Progress towards goals / Updated goals:  1. Patient will increase right shoulder strength to 4/5 to improve ease of daily tasks.   2. Patient will demonstrate good understanding of final HEP to continue strengthening independently when discharged from physical therapy.      PLAN  []  Upgrade activities as tolerated     [x]  Continue plan of care  []  Update interventions per flow sheet       [x]  Discharge next visit  []  Other:_      Gregg Cardona, BIRGIT 7/15/2019  2:04 PM    Future Appointments   Date Time Provider Dex Alysia   7/15/2019  2:30 PM Yari Stiles, PT [id number] [facility]   7/15/2019  3:15 PM Burnadejarad Erickson GGQLMUY [facility]   7/17/2019  1:45 PM Elfida Ora, OTR/L MMCPTPB [facility]   7/17/2019  2:30 PM Valorei Flowers, BIRGIT MMCPTPB [facility]   7/17/2019  3:00 PM Yari Stiles, PT [id number] [facility]   7/19/2019 10:30 AM July Dang PA-C [facility]   7/22/2019  8:30 AM Carin Baker  [address]   7/22/2019  2:40 PM [facility] NURSE [place] [id number]   7/24/2019  2:30 PM Kiki Purdy, PTA MMCPTPB [facility]   7/24/2019  3:15 PM Burnmik Erickson [id number] [facility]   7/25/2019 10:00 AM Yari Stiles, PT MMCPTPB [facility]   7/25/2019 11:15 AM Elfida Ora, OTR/L MMCPTPB [facility]   7/29/2019 11:00 AM Vee Erickson KPBFQRM [facility]   7/29/2019 12:30 PM Yari Stiles, PT MMCPTPB [facility]   7/31/2019 11:00 AM Vee Erickson UYAORXJ [facility]   7/31/2019 12:30 PM Yari Stiles, PT MMCPTPB [facility]   9/11/2019 11:30 AM MD TARI Hyltonformerly [facility]   10/2/2019  1:00 PM Given, Karissa Walden MD [address]

## 2019-07-15 NOTE — PROGRESS NOTES
PT DAILY TREATMENT NOTE 10-18    Patient Name: Abe Graham  Date:7/15/2019  : 1955  [x]  Patient  Verified  Payor: VA MEDICARE / Plan: VA MEDICARE PART A & B / Product Type: Medicare /    In time:230  Out time:304  Total Treatment Time (min): 34  Visit #: 4 of     Medicare/BCBS Only   Total Timed Codes (min):  34 1:1 Treatment Time:  30       Treatment Area: Presence of left artificial knee joint [Z96.652]  Iliotibial band syndrome, left leg [M76.32]    SUBJECTIVE  Pain Level (0-10 scale): 2-3/10  Any medication changes, allergies to medications, adverse drug reactions, diagnosis change, or new procedure performed?: [x] No    [] Yes (see summary sheet for update)  Subjective functional status/changes:   [] No changes reported  Pt reports he has new inserts, just needs to change. \" I think it did some good\" regarding KT on IT band. He reports he his less pain along ITB. OBJECTIV      29 min Therapeutic Exercise:  [x] See flow sheet :   Rationale: increase ROM, increase strength and improve coordination to improve the patients ability to improve ease of transfers. 5 minutes manual therapy: stick/DTM to left ITB          With   [] TE   [] TA   [] neuro   [] other: Patient Education: [x] Review HEP    [] Progressed/Changed HEP based on:   [] positioning   [] body mechanics   [] transfers   [] heat/ice application    [] other:      Other Objective/Functional Measures:   Challenged with eccentric step down, cues provided to improve form   Fatigue with clam#3  Good recruitment of glutes with standing hip abduction/extension      Pain Level (0-10 scale) post treatment: 1/10    ASSESSMENT/Changes in Function: Patient progressing towards goals with reduction in pain level. He reports good relief with addressing IT band restrictions. Patient is more aware of his LE posture before getting up to stand and in turn has had decreased pain when performing transfers.      Patient will continue to benefit from skilled PT services to modify and progress therapeutic interventions, address functional mobility deficits, address ROM deficits, address strength deficits, analyze and address soft tissue restrictions and analyze and cue movement patterns to attain remaining goals. [x]  See Plan of Care  []  See progress note/recertification  []  See Discharge Summary         Progress towards goals / Updated goals:  1. Pt will be independent with HEP to maintain progression.  MET (6/17/19)     Long term goals: To be accomplished within 8 weeks  1. Pt will improve FOTO score by 10 points to 57/100 to show improvement with functional mobility performance. 2. Pt will have Left knee AROM improved to 0-115 degs at least to amb household and community with normal and safe pattern. Not met: 106 degrees (6/21/19)  3. Pt will have B hip abd, ext strength improved to at least 4/5 to be able to amb longer distance and navigate stair safely.   4. Pt will report no more than 1-2/10 pain at worst so he can amb and perform ADLs with ease.            PLAN  []  Upgrade activities as tolerated     [x]  Continue plan of care  []  Update interventions per flow sheet       []  Discharge due to:_  []  Other:_      Daryl Flannery PT 7/15/2019  1:23 PM    Future Appointments   Date Time Provider Dex [place]   7/15/2019  2:00 PM Yari Perez PTA MMCPTPB [facility]   7/15/2019  2:30 PM Erin Plummer PT [id number] [facility]   7/15/2019  3:15 PM Kate North HQIITHZ [facility]   7/17/2019  1:45 PM Ivania Barnett, OTR/L MMCPTPB [facility]   7/17/2019  2:30 PM Yari Perez PTA MMCPTPB [facility]   7/17/2019  3:00 PM Erin Plummer PT MMCPTPB [facility]   7/19/2019 10:30 AM Samm Vizcaino PA-C [facility] SCHED   7/22/2019  8:30 AM Alden Mcfadden  [address]   7/22/2019  2:40 PM [facility] [address]   7/24/2019  2:30 PM Tressa Riojas PTA MMCPTPB [facility]   7/24/2019  3:15 PM Kate North LRJURHE [facility]   7/25/2019 10:00 AM Elpidio Mae Duane, PT MMCPTPB [facility]   7/25/2019 11:15 AM Danni Novak, OTR/L MMCPTPB [facility]   7/29/2019 11:00 AM Malik James JTHNFKY [facility]   7/29/2019 12:30 PM Silverio Dodson, PT MMCPTPB [facility]   7/31/2019 11:00 AM Malik James KUZJMKN [facility]   7/31/2019 12:30 PM Silverio Dodson, PT MMCPTPB [facility]   9/11/2019 11:30 AM Chicho Ayon MD [facility]   10/2/2019  1:00 PM Gene Chinchilla MD [address]

## 2019-07-17 ENCOUNTER — HOSPITAL ENCOUNTER (OUTPATIENT)
Dept: PHYSICAL THERAPY | Age: 64
Discharge: HOME | End: 2019-07-17
Payer: MEDICARE

## 2019-07-17 PROCEDURE — 97110 THERAPEUTIC EXERCISES: CPT

## 2019-07-17 PROCEDURE — 97018 PARAFFIN BATH THERAPY: CPT

## 2019-07-17 NOTE — PROGRESS NOTES
PT DAILY TREATMENT NOTE 10-18    Patient Name: Blas Reading  Date:2019  : 1955  [x]  Patient  Verified  Payor: VA MEDICARE / Plan: VA MEDICARE PART A & B / Product Type: Medicare /    In time:247  Out time:325  Total Treatment Time (min): 38  Visit #: 10 of 8    Medicare/BCBS Only   Total Timed Codes (min):  28 1:1 Treatment Time:  28       Treatment Area: Presence of left artificial knee joint [Z96.652]  Iliotibial band syndrome, left leg [M76.32]    SUBJECTIVE  Pain Level (0-10 scale): 2-3/10  Any medication changes, allergies to medications, adverse drug reactions, diagnosis change, or new procedure performed?: [x] No    [] Yes (see summary sheet for update)  Subjective functional status/changes:   [] No changes reported  Pt stated that he cont with stiffness in the left knee that increased with prolonged inactivity    OBJECTIVE    Modality rationale: decrease inflammation and decrease pain to improve the patients ability to increase ease with ADLs   Min Type Additional Details    [] Estim:  []Unatt       []IFC  []Premod                        []Other:  []w/ice   []w/heat  Position:  Location:    [] Estim: []Att    []TENS instruct  []NMES                    []Other:  []w/US   []w/ice   []w/heat  Position:  Location:    []  Traction: [] Cervical       []Lumbar                       [] Prone          []Supine                       []Intermittent   []Continuous Lbs:  [] before manual  [] after manual    []  Ultrasound: []Continuous   [] Pulsed                           []1MHz   []3MHz W/cm2:  Location:    []  Iontophoresis with dexamethasone         Location: [] Take home patch   [] In clinic   10 [x]  Ice     []  heat  []  Ice massage  []  Laser   []  Anodyne Position: seated  Location:left knee    []  Laser with stim  []  Other:  Position:  Location:    []  Vasopneumatic Device Pressure:       [] lo [] med [] hi   Temperature: [] lo [] med [] hi   [x] Skin assessment post-treatment:  [x]intact []redness- no adverse reaction    []redness - adverse reaction:     28 min Therapeutic Exercise:  [x] See flow sheet :   Rationale: increase ROM and increase strength to improve the patients ability to increase ease with ADLs    With   [x] TE   [] TA   [] neuro   [] other: Patient Education: [x] Review HEP    [] Progressed/Changed HEP based on:   [] positioning   [] body mechanics   [] transfers   [] heat/ice application    [] other:      Other Objective/Functional Measures:   Had significant difficulty with clams #3  Sit to stands are improving  Bridges cont to be challenging     Pain Level (0-10 scale) post treatment: 1/10    ASSESSMENT/Changes in Function:   Pt is slowly progressing toward goals. Pt cont with mild pain in the left knee. Reported that most of the trigger points are now gone in the left thigh. Pt cont to report increased stiffness in the left knee after prolonged sitting. Pt cont with decreased range of motion in the left knee    Patient will continue to benefit from skilled PT services to modify and progress therapeutic interventions, address functional mobility deficits, address ROM deficits and address strength deficits to attain remaining goals. []  See Plan of Care  [x]  See progress note/recertification  []  See Discharge Summary         Progress towards goals / Updated goals:  1. Pt will be independent with HEP to maintain progression.  MET (6/17/19)     Long term goals: To be accomplished within 8 weeks  1. Pt will improve FOTO score by 10 points to 57/100 to show improvement with functional mobility performance. 2. Pt will have Left knee AROM improved to 0-115 degs at least to amb household and community with normal and safe pattern. Not met: 106 degrees (6/21/19)  3. Pt will have B hip abd, ext strength improved to at least 4/5 to be able to amb longer distance and navigate stair safely. Slowly progressing. 7/17/19  4.  Pt will report no more than 1-2/10 pain at worst so he can amb and perform ADLs with ease.     PLAN  []  Upgrade activities as tolerated     [x]  Continue plan of care  []  Update interventions per flow sheet       []  Discharge due to:_  []  Other:_      Imelda , BIRGIT 7/17/2019  3:22 PM    Future Appointments   Date Time Provider Dex [place]   7/19/2019 10:30 AM ESPERANZA [address]   7/22/2019  8:30 AM Alexis Blankenship  [address]   7/22/2019  2:40 PM [address] [facility]   7/24/2019  2:30 PM Naima Ellis PTA MMCPTPB [facility]   7/24/2019  3:15 PM Daiana TINEOEQOY [facility]   7/25/2019 10:00 AM Dank Sterling, PT MMCPTPB [facility]   7/25/2019 11:15 AM Randee Sweet OTR/L MMCPTP[facility]   7/29/2019 11:00 AM Daiana Maloney KMPEXTS [facility]   7/29/2019 12:30 PM Dank Sterling, PT MMCPTPB [facility]   7/31/2019 11:00 AM Daiana Maloney OYCZKRX [facility]   7/31/2019 12:30 PM Dank Sterling, PT MMCPTPB [facility]   9/11/2019 11:30 AM MD CARLY Ferrara-REBECCA OLIVEIRA [facility]   10/2/2019  1:00 PM Suze Chinchilla Mc, MD [address] [facility]

## 2019-07-17 NOTE — PROGRESS NOTES
PT DAILY TREATMENT NOTE 10-18    Patient Name: Brittney De La Rosa  Date:2019  : 1955  [x]  Patient  Verified  Payor: VA MEDICARE / Plan: VA MEDICARE PART A & B / Product Type: Medicare /    In time:234  Out time:246  Total Treatment Time (min): 12  Visit #: 8 of     Medicare/BCBS Only   Total Timed Codes (min):  12 1:1 Treatment Time:  12       Treatment Area: Pain in left knee [M25.562]  Pain in right shoulder [M25.511]    SUBJECTIVE  Pain Level (0-10 scale): 0/10  Any medication changes, allergies to medications, adverse drug reactions, diagnosis change, or new procedure performed?: [x] No    [] Yes (see summary sheet for update)  Subjective functional status/changes:   [] No changes reported  Pt stated that he has no pain in his sh today    OBJECTIVE    12 min Therapeutic Exercise:  [x] See flow sheet :   Rationale: increase ROM and increase strength to improve the patients ability to increase ease with ADLs    With   [x] TE   [] TA   [] neuro   [] other: Patient Education: [x] Review HEP    [x] Progressed/Changed HEP based on:   [] positioning   [] body mechanics   [] transfers   [] heat/ice application    [] other:      Other Objective/Functional Measures:   Pt was issued final HEP  Pt showed understanding of all exercises     Pain Level (0-10 scale) post treatment: 0/10    ASSESSMENT/Changes in Function:   []  See Plan of Care  []  See progress note/recertification  [x]  See Discharge Summary         Progress towards goals / Updated goals:  1. Patient will increase right shoulder strength to 4/5 to improve ease of daily tasks. Goal met. Strength for flex and abd is 4/5 and ER/IR is 4+/5. 19  2. Patient will demonstrate good understanding of final HEP to continue strengthening independently when discharged from physical therapy.   Goal met.  19    PLAN  []  Upgrade activities as tolerated     []  Continue plan of care  []  Update interventions per flow sheet       [x]  Discharge due to:program complete  []  Other:_      jamie Keller, PTA 7/17/2019  1:54 PM    Future Appointments   Date Time Provider Dex [place]   7/17/2019  2:30 PM Jolie Nieves, PTA MMCPTPB [facility]   7/17/2019  3:00 PM Jolie Nieves, PTA MMCPTPB [facility]   7/19/2019 10:30 AM Prachi Zamudio PA-C [facility] SCHED   7/22/2019  8:30 AM Charly Mccann  [address]   7/22/2019  2:40 PM [address] [facility]   7/24/2019  2:30 PM Roseline Lainez, BIRGIT MMCPTPB [facility]   7/24/2019  3:15 PM Marcia Gonzalez UUSYBYY [facility]   7/25/2019 10:00 AM Chad See, PT MMCPTPB [facility]   7/25/2019 11:15 AM Tommy Medal, OTR/L MMCPTPB [facility]   7/29/2019 11:00 AM Marcia Gonzalez SUFPJUE [facility]   7/29/2019 12:30 PM Chad See, PT MMCPTPB [facility]   7/31/2019 11:00 AM Marcia Gonzalez ORXNGKU [facility]   7/31/2019 12:30 PM Chad See, PT MMCPTPB [facility]   9/11/2019 11:30 AM Sun Lo MD [facility] SCHED   10/2/2019  1:00 PM Param Chinchilla MD [address]

## 2019-07-17 NOTE — PROGRESS NOTES
OT DAILY TREATMENT NOTE 10-18    Patient Name: Stephanie Isaacs  Date:2019  : 1955  [x]  Patient  Verified  Payor: VA MEDICARE / Plan: VA MEDICARE PART A & B / Product Type: Medicare /    In time:153  Out time:231  Total Treatment Time (min): 38  Visit #: 5 of 10    Medicare/BCBS Only   Total Timed Codes (min):  28 1:1 Treatment Time:  38     Treatment Area: Pain in right hand [M79.641]  Pain in left hand [M79.642]    SUBJECTIVE  Pain Level (0-10 scale): 2/10  Any medication changes, allergies to medications, adverse drug reactions, diagnosis change, or new procedure performed?: [x] No    [] Yes (see summary sheet for update)  Subjective functional status/changes:   [] No changes reported  Used my new can opener    OBJECTIVE    Modality rationale: decrease pain to improve the patients ability to grasp   Min Type Additional Details    [] Estim:  []Unatt       []IFC  []Premod                        []Other:  []w/ice   []w/heat  Position:  Location:    [] Estim: []Att    []TENS instruct  []NMES                    []Other:  []w/US   []w/ice   []w/heat  Position:  Location:    []  Traction: [] Cervical       []Lumbar                       [] Prone          []Supine                       []Intermittent   []Continuous Lbs:  [] before manual  [] after manual    []  Ultrasound: []Continuous   [] Pulsed                           []1MHz   []3MHz W/cm2:  Location:    []  Iontophoresis with dexamethasone         Location: [] Take home patch   [] In clinic    []  Ice     []  heat  []  Ice massage  []  Laser   []  Anodyne Position:  Location:     10 []  Laser with stim  [x]  Other:paraffin  Position:  Location:both hands    []  Vasopneumatic Device Pressure:       [] lo [] med [] hi   Temperature: [] lo [] med [] hi       [x] Skin assessment post-treatment:  [x]intact []redness- no adverse reaction    []redness - adverse reaction:       28 min Therapeutic Exercise:  [] See flow sheet :   Rationale: increase strength to improve the patients ability to grasp  Red therabar x 15 flex ext and sup pro   Yellow digiflex O s for strengthening x 15  Med putty and pegs x 10 both hands      With   [] TE   [] TA   [] neuro   [] other: Patient Education: [x] Review HEP    [] Progressed/Changed HEP based on:  Weight cuffs  [] positioning   [] body mechanics   [] transfers   [] heat/ice application   [] Splint wear/care   [] Sensory re-education   [] scar management      [] other:            Other Objective/Functional Measures:   diffiuclty keeping O with digiflex     Pain Level (0-10 scale) post treatment: 1/10    ASSESSMENT/Changes in Function: Improving strength    Patient will continue to benefit from skilled OT services to modify and progress therapeutic interventions, address strength deficits, analyze and cue movement patterns and instruct in home and community integration to attain remaining goals. []  See Plan of Care  []  See progress note/recertification  []  See Discharge Summary         Progress towards goals / Updated goals:  *2.  Patient will be independent in home program to increase thumb abduciton in right and both digit flexion. Met with HEP 7/9/19  3.  Patient will be introduced to joint protection strategies and adaptive equipment to improve self and home care and reduce pain. Progressing with in clinic tasks/Pt education 7/3/19  4.  Patient will be independent in home program for hand strengthening Medium Putty HEP administered on this date 7/3/19     LTGs   1.  Patient will report pain diminished to 0-2/10 with routine use Progressing 7/15/19, met today 7/17/19  2.  Patient will increase  strength in both hand by 10 pounds to increase ease of opening jars Progressing with in clinic activities 7/15/19  3.  Patient will incorporate adaptive equipment and strategies to improve independence in self care tasks such as buttoning, cutting, writing etc. Education on Joint protective strategies/Techniques given on this date 7/12/19**    PLAN  []  Upgrade activities as tolerated     [x]  Continue plan of care  []  Update interventions per flow sheet       []  Discharge due to:_  []  Other:_      Dale Odonnell, OTR/L 7/17/2019  1:58 PM    Future Appointments   Date Time Provider Dex [place]   7/17/2019  2:30 PM Silvia Lew, PTA [facility]CP[facility] [address]   7/17/2019  3:00 PM Silvia Lew, PTA [facility] [address]   7/19/2019 10:30 AM ESPERANZA [address]   7/22/2019  8:30 AM Juanita Dupont  [address]   7/22/2019  2:40 PM [place] [address] [facility] [address]   7/24/2019  2:30 PM Valentin Prieto, PTA [facility] [address]   7/24/2019  3:15 PM Soledad Huddleston EBZZRFZ [address]   7/25/2019 10:00 AM Lelo Show, PT [facility] [address]   7/25/2019 11:15 AM Sharon Salmon, OTR/L [facility] [address]   7/29/2019 11:00 AM Soledad Huddleston BAXMNVT [address]   7/29/2019 12:30 PM Lelo Show, PT [facility] [address]   7/31/2019 11:00 AM Soledad Huddleston FJASVEV [address]   7/31/2019 12:30 PM Lelo Show, PT [facility] [address]   9/11/2019 11:30 AM MD TARI BrewerMA-MO ROXANNE [facility]   10/2/2019  1:00 PM Rivas Chinchilla MD [place]

## 2019-07-19 ENCOUNTER — OFFICE VISIT (OUTPATIENT)
Dept: ORTHOPEDIC SURGERY | Age: 64
End: 2019-07-19

## 2019-07-19 VITALS
HEART RATE: 69 BPM | WEIGHT: 231.6 LBS | OXYGEN SATURATION: 96 % | HEIGHT: 70 IN | RESPIRATION RATE: 17 BRPM | DIASTOLIC BLOOD PRESSURE: 74 MMHG | BODY MASS INDEX: 33.16 KG/M2 | TEMPERATURE: 98 F | SYSTOLIC BLOOD PRESSURE: 120 MMHG

## 2019-07-19 DIAGNOSIS — M65.9 SYNOVITIS OF KNEE: ICD-10-CM

## 2019-07-19 DIAGNOSIS — Z96.652 STATUS POST TOTAL LEFT KNEE REPLACEMENT: Primary | ICD-10-CM

## 2019-07-19 RX ORDER — GABAPENTIN 400 MG/1
400 CAPSULE ORAL 3 TIMES DAILY
COMMUNITY
End: 2019-08-08 | Stop reason: ALTCHOICE

## 2019-07-19 NOTE — PROGRESS NOTES
1. Have you been to the ER, urgent care clinic since your last visit? Hospitalized since your last visit? No    2. Have you seen or consulted any other health care providers outside of the [address] [place] [address] since your last visit? Include any pap smears or colon screening.  No

## 2019-07-19 NOTE — PROGRESS NOTES
[address] [facility], [address] [facility]  [phone]           Patient: Crow Pereyra                MRN: [id number]       SSN: [id number]  YOB: 1955        AGE: 75 y.o. SEX: male  Body mass index is 33.23 kg/m². PCP: Emmanuel Sharma MD  07/19/19      This office note has been dictated. REVIEW OF SYSTEMS:  Constitutional: Negative for fever, chills, weight loss and malaise/fatigue. HENT: Negative. Eyes: Negative. Respiratory: Negative. Cardiovascular: Negative. Gastrointestinal: No bowel incontinence or constipation. Genitourinary: No bladder incontinence or saddle anesthesia. Skin: Negative. Neurological: Negative. Endo/Heme/Allergies: Negative. Psychiatric/Behavioral: Negative. Musculoskeletal: As per HPI above. Past Medical History:   Diagnosis Date    Arthritis     Bleeding     Chronic pain     knee and shoulder    GERD (gastroesophageal reflux disease)     Headache(784.0)     migraine    High cholesterol     Hypertension     Lower back pain 11/6/2010    Other chest pain     Pure hypercholesterolemia     Right buttock pain 11/6/2010    Right foot pain     Rotator cuff tear     left-since 2010, worsened tear Young.    Rotator cuff tear, right     Spinal stenosis     Tendonitis, tibialis     anterior    Thromboembolus (HCC)     3 after sx last one 2000         Current Outpatient Medications:     warfarin (COUMADIN) 5 mg tablet, TAKE 2 AND 1/2 TABLETS BY MOUTH DAILY OR AS DIRECTED, Disp: 75 Tab, Rfl: 0    lansoprazole (PREVACID) 30 mg capsule, TAKE 1 CAPSULE DAILY BEFOREBREAKFAST, Disp: 90 Cap, Rfl: 3    diclofenac (VOLTAREN) 1 % gel, Apply 2 g to affected area four (4) times daily. , Disp: 100 g, Rfl: 2    celecoxib (CELEBREX) 200 mg capsule, Take 1 Cap by mouth two (2) times a day for 90 days. (Patient taking differently: Take 200 mg by mouth two (2) times a day.  Indications: 2 tabs weekly), Disp: 60 Cap, Rfl: 2    montelukast (SINGULAIR) 10 mg tablet, TAKE 1 TABLET BY MOUTH EVERY DAY, Disp: 90 Tab, Rfl: 0    butalbital-acetaminophen-caff (FIORICET) -40 mg per capsule, Take 1 Cap by mouth every eight (8) hours as needed for Pain., Disp: 90 Cap, Rfl: 1    diclofenac (VOLTAREN) 1 % gel, Apply 4 g to affected area four (4) times daily. Maximum 16 grams per joint per day. Dispense 5 100 gram tubes, Disp: 5 Each, Rfl: 0    lisinopril-hydroCHLOROthiazide (PRINZIDE, ZESTORETIC) 20-12.5 mg per tablet, TAKE 1 TABLET BY MOUTH DAILY, Disp: 90 Tab, Rfl: 0    celecoxib (CELEBREX) 50 mg capsule, Take 50 mg by mouth two (2) times a day. Indications: 2 tabs weekly, Disp: , Rfl:     labetalol (NORMODYNE) 100 mg tablet, TAKE ONE TABLET BY MOUTH TWICE DAILY, Disp: 180 Tab, Rfl: 0    metaxalone (SKELAXIN) 800 mg tablet, Take 1 Tab by mouth three (3) times daily. Indications: muscle spasm, Disp: 90 Tab, Rfl: 2    raNITIdine (ZANTAC) 150 mg tablet, TK 1 T PO HS, Disp: , Rfl: 1    ALPRAZolam (XANAX) 0.5 mg tablet, Take one half(1/2) tab to one(1) tab by mouth at bedtime as needed for sleep, Disp: 30 Tab, Rfl: 0    warfarin (COUMADIN) 5 mg tablet, TAKE 2 AND 1/2 TABLETS BY MOUTH DAILY OR AS DIRECTED, Disp: 75 Tab, Rfl: 0    warfarin (COUMADIN) 3 mg tablet, Or as directed, Disp: 30 Tab, Rfl: 3    montelukast (SINGULAIR) 10 mg tablet, TAKE 1 TABLET BY MOUTH EVERY DAY (Patient taking differently: TAKE 1 TABLET BY MOUTH EVERY HS), Disp: 90 Tab, Rfl: 0    acetaminophen (TYLENOL) 500 mg tablet, Take 1,000 mg by mouth every six (6) hours as needed for Pain., Disp: , Rfl:     gabapentin (NEURONTIN) 400 mg capsule, Take 400 mg by mouth three (3) times daily. , Disp: , Rfl:     Allergies   Allergen Reactions    Amoxicillin Itching    Augmentin [Amoxicillin-Pot Clavulanate] Itching    Chlorhexidine Towelette Itching    Hibiclens [Chlorhexidine Gluconate] Itching    Milk Containing Products Diarrhea  Penicillins Rash    Requip [Ropinirole] Nausea and Vomiting       Social History     Socioeconomic History    Marital status:      Spouse name: Not on file    Number of children: Not on file    Years of education: Not on file    Highest education level: Not on file   Occupational History    Not on file   Social Needs    Financial resource strain: Not on file    Food insecurity:     Worry: Not on file     Inability: Not on file    Transportation needs:     Medical: Not on file     Non-medical: Not on file   Tobacco Use    Smoking status: Never Smoker    Smokeless tobacco: Never Used   Substance and Sexual Activity    Alcohol use: No    Drug use: No    Sexual activity: Not Currently   Lifestyle    Physical activity:     Days per week: Not on file     Minutes per session: Not on file    Stress: Not on file   Relationships    Social connections:     Talks on phone: Not on file     Gets together: Not on file     Attends Mosque service: Not on file     Active member of club or organization: Not on file     Attends meetings of clubs or organizations: Not on file     Relationship status: Not on file    Intimate partner violence:     Fear of current or ex partner: Not on file     Emotionally abused: Not on file     Physically abused: Not on file     Forced sexual activity: Not on file   Other Topics Concern     Service Not Asked    Blood Transfusions Not Asked    Caffeine Concern Not Asked    Occupational Exposure Not Asked   [place] Hazards Not Asked    Sleep Concern Not Asked    Stress Concern Not Asked    Weight Concern Not Asked    Special Diet Not Asked    Back Care Not Asked    Exercise Not Asked    Bike Helmet Not Asked   [address] Not Asked    Self-Exams Not Asked   Social History Narrative    Not on file       Past Surgical History:   Procedure Laterality Date    FOOT/TOES SURGERY PROC UNLISTED      HX BACK SURGERY      HX HEENT Right 09/2018    eye surgery, macular     HX KNEE REPLACEMENT Left     HX ORTHOPAEDIC  06-25-12    Right foot with excision of bursa and adipose tissue from fifth metatarsal base by Dr. Radha Rivas      lower back (1992 and 2000)    HX OTHER SURGICAL      left foot (2008)    HX OTHER SURGICAL      Retina repair     HX PROSTATECTOMY  11/2018    HX ROTATOR CUFF REPAIR Right 01/28/2019    by Dr. Lu Feliz               We did see Mr. Neil Moralez for followup with regards to his left knee replacement. The patient is now about six months status post surgery. He was having a little discomfort and was seen by Chantelle Rubio P.A.-C. He is improved at this point. He has been worked up for infection for which this was negative. He does have a little stiffness and a little achiness with weather changes. He denies any radiating pain or numbness down the lower extremity and no injuries to report. PHYSICAL EXAMINATION:  In general, the patient is alert and oriented x 3 in no acute distress. The patient is well-developed, well-nourished, with a normal affect. The patient is afebrile. HEENT:  Head is normocephalic and atraumatic. Pupils are equally round and reactive to light and accommodation. Extraocular eye movements are intact. Neck is supple. Trachea is midline. No JVD is present. Breathing is nonlabored. Examination of the lower extremities reveals pain-free range of motion of the hips. There is no pain to palpation of the greater trochanteric bursae. There is negative straight leg raise. There is negative calf tenderness. There is negative Giorgio's. There is no evidence of DVT present. The left knee reveals the skin is intact. There is no erythema, ecchymosis, and no warmth. There is minimal effusion, negative patellar ballottement. He does have two areas which look like eczema.   He has full range of motion, very good stability, and the patella tracks nicely without rubs or crepitus noted. There is no tenderness to palpation about the knee today. LABORATORY STUDIES:   Review of previous labs shows an IL of 3.3. CBC white count of 5.0. Sed rate is 18. ASSESSMENT:      1. Status post left knee replacement. 2. Synovitis left knee improving. PLAN:  At this point, the patient will continue with Voltaren gel and activities as tolerated. He will follow up with us in three months' time for evaluation. He will call with any questions or concerns that shall arise.                 JR Willie GATES, PAChantelC, ATC

## 2019-07-22 ENCOUNTER — APPOINTMENT (OUTPATIENT)
Dept: PHYSICAL THERAPY | Age: 64
End: 2019-07-22
Payer: MEDICARE

## 2019-07-22 ENCOUNTER — OFFICE VISIT (OUTPATIENT)
Dept: ORTHOPEDIC SURGERY | Age: 64
End: 2019-07-22

## 2019-07-22 VITALS
DIASTOLIC BLOOD PRESSURE: 80 MMHG | SYSTOLIC BLOOD PRESSURE: 131 MMHG | HEIGHT: 70 IN | HEART RATE: 75 BPM | BODY MASS INDEX: 33.21 KG/M2 | WEIGHT: 232 LBS

## 2019-07-22 DIAGNOSIS — M48.061 SPINAL STENOSIS OF LUMBAR REGION WITHOUT NEUROGENIC CLAUDICATION: ICD-10-CM

## 2019-07-22 DIAGNOSIS — M51.26 DISPLACEMENT OF LUMBAR INTERVERTEBRAL DISC WITHOUT MYELOPATHY: Primary | ICD-10-CM

## 2019-07-22 DIAGNOSIS — M51.27 LUMBAGO-SCIATICA DUE TO DISPLACEMENT OF LUMBAR INTERVERTEBRAL DISC: ICD-10-CM

## 2019-07-22 DIAGNOSIS — M47.816 FACET ARTHROPATHY, LUMBAR: ICD-10-CM

## 2019-07-22 DIAGNOSIS — Z79.01 CHRONIC ANTICOAGULATION: ICD-10-CM

## 2019-07-22 NOTE — PATIENT INSTRUCTIONS
Neck Arthritis: Exercises  Introduction  Here are some examples of exercises for you to try. The exercises may be suggested for a condition or for rehabilitation. Start each exercise slowly. Ease off the exercises if you start to have pain. You will be told when to start these exercises and which ones will work best for you. How to do the exercises  Neck stretches to the side    1. This stretch works best if you keep your shoulder down as you lean away from it. To help you remember to do this, start by relaxing your shoulders and lightly holding on to your thighs or your chair. 2. Tilt your head toward your shoulder and hold for 15 to 30 seconds. Let the weight of your head stretch your muscles. 3. Repeat 2 to 4 times toward each shoulder. Chin tuck    1. Lie on the floor with a rolled-up towel under your neck. Your head should be touching the floor. 2. Slowly bring your chin toward your chest.  3. Hold for a count of 6, and then relax for up to 10 seconds. 4. Repeat 8 to 12 times. Active cervical rotation    1. Sit in a firm chair, or stand up straight. 2. Keeping your chin level, turn your head to the right, and hold for 15 to 30 seconds. 3. Turn your head to the left and hold for 15 to 30 seconds. 4. Repeat 2 to 4 times to each side. Shoulder blade squeeze    1. While standing, squeeze your shoulder blades together. 2. Do not raise your shoulders up as you are squeezing. 3. Hold for 6 seconds. 4. Repeat 8 to 12 times. Shoulder rolls    1. Sit comfortably with your feet shoulder-width apart. You can also do this exercise standing up. 2. Roll your shoulders up, then back, and then down in a smooth, circular motion. 3. Repeat 2 to 4 times. Follow-up care is a key part of your treatment and safety. Be sure to make and go to all appointments, and call your doctor if you are having problems.  It's also a good idea to know your test results and keep a list of the medicines you take.  Where can you learn more? Go to http://rivka-jarrell.info/. Enter C200 in the search box to learn more about \"Neck Arthritis: Exercises. \"  Current as of: September 20, 2018  Content Version: 12.1  © 6622-4899 [employer]. Care instructions adapted under license by [employer] (which disclaims liability or warranty for this information). If you have questions about a medical condition or this instruction, always ask your healthcare professional. [address] any warranty or liability for your use of this information. Low Back Arthritis: Exercises  Introduction  Here are some examples of typical rehabilitation exercises for your condition. Start each exercise slowly. Ease off the exercise if you start to have pain. Your doctor or physical therapist will tell you when you can start these exercises and which ones will work best for you. When you are not being active, find a comfortable position for rest. Some people are comfortable on the floor or a medium-firm bed with a small pillow under their head and another under their knees. Some people prefer to lie on their side with a pillow between their knees. Don't stay in one position for too long. Take short walks (10 to 20 minutes) every 2 to 3 hours. Avoid slopes, hills, and stairs until you feel better. Walk only distances you can manage without pain, especially leg pain. How to do the exercises  Pelvic tilt    4. Lie on your back with your knees bent. 5. \"Brace\" your stomach--tighten your muscles by pulling in and imagining your belly button moving toward your spine. 6. Press your lower back into the floor. You should feel your hips and pelvis rock back. 7. Hold for 6 seconds while breathing smoothly. 8. Relax and allow your pelvis and hips to rock forward. 9. Repeat 8 to 12 times. Back stretches    5. Get down on your hands and knees on the floor.   6. Relax your head and allow it to droop. Round your back up toward the ceiling until you feel a nice stretch in your upper, middle, and lower back. Hold this stretch for as long as it feels comfortable, or about 15 to 30 seconds. 7. Return to the starting position with a flat back while you are on your hands and knees. 8. Let your back sway by pressing your stomach toward the floor. Lift your buttocks toward the ceiling. 9. Hold this position for 15 to 30 seconds. 10. Repeat 2 to 4 times. Follow-up care is a key part of your treatment and safety. Be sure to make and go to all appointments, and call your doctor if you are having problems. It's also a good idea to know your test results and keep a list of the medicines you take. Where can you learn more? Go to http://rivka-jarrell.info/. Enter M587 in the search box to learn more about \"Low Back Arthritis: Exercises. \"  Current as of: September 20, 2018  Content Version: 12.1  © 7095-7101 [employer]. Care instructions adapted under license by [employer] (which disclaims liability or warranty for this information). If you have questions about a medical condition or this instruction, always ask your healthcare professional. [address] any warranty or liability for your use of this information.

## 2019-07-22 NOTE — PROGRESS NOTES
[facility]  [address] [place], [facility] [address]  Phone: [phone]  Fax: [phone]    Pt's YOB: 1955    ASSESSMENT   Diagnoses and all orders for this visit:    1. Displacement of lumbar intervertebral disc without myelopathy    2. Lumbago-sciatica due to displacement of lumbar intervertebral disc    3. Facet arthropathy, lumbar    4. Spinal stenosis of lumbar region without neurogenic claudication    5. Chronic anticoagulation         IMPRESSION AND PLAN:  Miryam Lion is a 75 y.o. male with history of lumbar pain. Pt notes that he fell at the end of April while he was at a laundromat. He went to the ER and according to the patient he was told he had a contusion in the ribs but x-rays did not demonstrate and fractures. Pt notes that he recently completed physical therapy for the right shoulder with benefit. He takes Skelaxin 800 mg at night as needed with benefit. 1) Pt was given information on cervical and lumbar arthritis exercises. 2) He is not a candidate for NSAID's due to anticoagulation with Coumadin.    3) Pt will continue taking Skelaxin 800 mg as prescribed and does not need a refill at this time. 4) He may fill his previously prescribed Medrol Dosepak if needed. 5) Mr. Vivian Duran has a reminder for a \"due or due soon\" health maintenance. I have asked that he contact his primary care provider, Tay Lamas MD, for follow-up on this health maintenance. 6)  demonstrated consistency with prescribing. Follow-up and Dispositions    · Return in about 4 months (around 11/22/2019) for Medication follow up. HISTORY OF PRESENT ILLNESS:  Andres Liu is a 75 y.o. male with history of lumbar pain and presents to the office today for follow up. He had a cataract removed from the right eye since his last office visit and notes that he can now see farther distances.  Pt notes that he fell at the end of April while he was at a laundromat. He states that he was wearing glasses and was walking out of the laundromat when he missed a large drop down/step and fell. Pt reports that he landed on his left side with the fall and notes pain in the left shoulder and ribs after the fall. He went to the ER and according to the patient he was told he had a contusion in the ribs but x-rays did not demonstrate and fractures. Pt notes that his pain improved on it's own within about 2-3 weeks. Of note, he had right shoulder arthroscopic surgery on 01/28/2019 with Dr. Lolita Bhatt. Pt notes that he recently completed physical therapy for the right shoulder with benefit. He notes significant improvement in the range of motion in his right shoulder. Pt also had a left knee replacement on 03/27/2018 with Dr. Danielle Perez. He us currently on Coumadin. Pt states that he did not take the Medrol Dosepak since his last office visit. He has been prescribed Skelaxin 800 mg and takes it at night as needed with benefit. Pt notes that he recently had a vascular study and was told he has venous insufficieny in both legs. He wears compression stockings with various degrees of compressions. His back pain is well managed at this time with the Skelaxin. Pt at this time desires to continue with current care. Of note, he works for [employer] and plans to retire in 1.5-2 years.      Pain Scale: 0 - No pain/10    PCP: Nader Pickens MD     Past Medical History:   Diagnosis Date    Arthritis     Bleeding     Chronic pain     knee and shoulder    GERD (gastroesophageal reflux disease)     Headache(784.0)     migraine    High cholesterol     Hypertension     Lower back pain 11/6/2010    Other chest pain     Pure hypercholesterolemia     Right buttock pain 11/6/2010    Right foot pain     Rotator cuff tear     left-since 2010, worsened tear Jan.    Rotator cuff tear, right     Spinal stenosis     Tendonitis, tibialis     anterior    [facility]     3 after sx last one 2000        Social History     Socioeconomic History    Marital status:      Spouse name: Not on file    Number of children: Not on file    Years of education: Not on file    Highest education level: Not on file   Occupational History    Not on file   Social Needs    Financial resource strain: Not on file    Food insecurity:     Worry: Not on file     Inability: Not on file    Transportation needs:     Medical: Not on file     Non-medical: Not on file   Tobacco Use    Smoking status: Never Smoker    Smokeless tobacco: Never Used   Substance and Sexual Activity    Alcohol use: No    Drug use: No    Sexual activity: Not Currently   Lifestyle    Physical activity:     Days per week: Not on file     Minutes per session: Not on file    Stress: Not on file   Relationships    Social connections:     Talks on phone: Not on file     Gets together: Not on file     Attends [employer] service: Not on file     Active member of club or organization: Not on file     Attends meetings of clubs or organizations: Not on file     Relationship status: Not on file    Intimate partner violence:     Fear of current or ex partner: Not on file     Emotionally abused: Not on file     Physically abused: Not on file     Forced sexual activity: Not on file   Other Topics Concern     Service Not Asked    Blood Transfusions Not Asked    Caffeine Concern Not Asked    Occupational Exposure Not Asked   Uzma Harms Hazards Not Asked    Sleep Concern Not Asked    Stress Concern Not Asked    Weight Concern Not Asked    Special Diet Not Asked    Back Care Not Asked    Exercise Not Asked    Bike Helmet Not Asked    Seat Belt Not Asked    Self-Exams Not Asked   Social History Narrative    Not on file       Current Outpatient Medications   Medication Sig Dispense Refill    warfarin (COUMADIN) 5 mg tablet TAKE 2 AND 1/2 TABLETS BY MOUTH DAILY OR AS DIRECTED 75 Tab 0    lansoprazole (PREVACID) 30 mg capsule TAKE 1 CAPSULE DAILY BEFOREBREAKFAST 90 Cap 3    diclofenac (VOLTAREN) 1 % gel Apply 2 g to affected area four (4) times daily. 100 g 2    montelukast (SINGULAIR) 10 mg tablet TAKE 1 TABLET BY MOUTH EVERY DAY 90 Tab 0    butalbital-acetaminophen-caff (FIORICET) -40 mg per capsule Take 1 Cap by mouth every eight (8) hours as needed for Pain. 90 Cap 1    diclofenac (VOLTAREN) 1 % gel Apply 4 g to affected area four (4) times daily. Maximum 16 grams per joint per day. Dispense 5 100 gram tubes 5 Each 0    lisinopril-hydroCHLOROthiazide (PRINZIDE, ZESTORETIC) 20-12.5 mg per tablet TAKE 1 TABLET BY MOUTH DAILY 90 Tab 0    labetalol (NORMODYNE) 100 mg tablet TAKE ONE TABLET BY MOUTH TWICE DAILY 180 Tab 0    metaxalone (SKELAXIN) 800 mg tablet Take 1 Tab by mouth three (3) times daily. Indications: muscle spasm 90 Tab 2    raNITIdine (ZANTAC) 150 mg tablet TK 1 T PO HS  1    ALPRAZolam (XANAX) 0.5 mg tablet Take one half(1/2) tab to one(1) tab by mouth at bedtime as needed for sleep 30 Tab 0    warfarin (COUMADIN) 5 mg tablet TAKE 2 AND 1/2 TABLETS BY MOUTH DAILY OR AS DIRECTED 75 Tab 0    warfarin (COUMADIN) 3 mg tablet Or as directed 30 Tab 3    montelukast (SINGULAIR) 10 mg tablet TAKE 1 TABLET BY MOUTH EVERY DAY (Patient taking differently: TAKE 1 TABLET BY MOUTH EVERY HS) 90 Tab 0    acetaminophen (TYLENOL) 500 mg tablet Take 1,000 mg by mouth every six (6) hours as needed for Pain.  gabapentin (NEURONTIN) 400 mg capsule Take 400 mg by mouth three (3) times daily.  celecoxib (CELEBREX) 200 mg capsule Take 1 Cap by mouth two (2) times a day for 90 days. (Patient not taking: Reported on 7/22/2019) 60 Cap 2    celecoxib (CELEBREX) 50 mg capsule Take 50 mg by mouth two (2) times a day.  Indications: 2 tabs weekly         Allergies   Allergen Reactions    Amoxicillin Itching    Augmentin [Amoxicillin-Pot Clavulanate] Itching    Chlorhexidine Towelette Itching    Hibiclens [Chlorhexidine Gluconate] Itching    Milk Containing Products Diarrhea    Penicillins Rash    Requip [Ropinirole] Nausea and Vomiting         REVIEW OF SYSTEMS    Constitutional: Negative for fever, chills, or weight change. Respiratory: Negative for cough or shortness of breath. Cardiovascular: Negative for chest pain or palpitations. Gastrointestinal: Negative for acid reflux, change in bowel habits, or constipation. Genitourinary: Negative for dysuria and flank pain. Musculoskeletal: Positive for cervical and lumbar pain. Skin: Negative for rash. Neurological: Negative for headaches, dizziness, or numbness. Endo/Heme/Allergies: Negative for increased bruising. Psychiatric/Behavioral: Negative for difficulty with sleep. PHYSICAL EXAMINATION  Visit Vitals  /80   Pulse 75   Ht 5' 10\" (1.778 m)   Wt 232 lb (105.2 kg)   BMI 33.29 kg/m²       Constitutional: Awake, alert, and in no acute distress. Neurological: 1+ symmetrical DTRs in the upper extremities. 1+ symmetrical DTRs in the lower extremities. Sensation to light touch is intact. Negative Lepe's sign bilaterally. Skin: warm, dry, and intact. Musculoskeletal: Good range of motion of the right shoulder. Tenderness to palpation in the lower lumbar region. Moderate pain with extension and axial loading. No pain with internal or external rotation of his hips. Negative straight leg raise bilaterally.       Biceps  Triceps Deltoids Wrist Ext Wrist Flex Hand Intrin   Right +4/5 +4/5 +4/5 +4/5 +4/5 +4/5   Left +4/5 +4/5 +4/5 +4/5 +4/5 +4/5      Hip Flex  Quads Hamstrings Ankle DF EHL Ankle PF   Right +4/5 +4/5 +4/5 +4/5 +4/5 +4/5   Left +4/5 +4/5 +4/5 +4/5 +4/5 +4/5     IMAGING:    Lumbar spine MRI from 01/24/2018 was personally reviewed with the patient and demonstrated:  Results from [facility] on 01/24/18   MRI LUMB SPINE W WO CONT     Narrative MR lumbar spine with and without contrast    CPT CODE: 01259    HISTORY: Chronic and worsening low back pain with left lower extremity pain. Increasing weakness. Remote history of surgeries. No recent injury. COMPARISON: MRI January 2013. TECHNIQUE: Lumbar spine scanned with axial and sagittal T1W scans, axial and  sagittal T2W scans, and with post gadolinium axial and sagittal T1W scans. Contrast used: 10 cc Gadavist.    FINDINGS:     Prior laminotomies on the left at L4-5 and bilaterally at L5-S1. No fracture,  bone destruction, or fluid collection. Intact lordosis. Normal vertebral body heights. Small less than 5 mm low signal  focus within anterior L4, developed since 2013. No similar focus elsewhere. No  aggressive features. Otherwise generally fatty marrow signal with some chronic  fatty endplate changes straddling L5-S1. Moderate to severe disc space narrowing  and disc desiccation of that level. Mild to moderate narrowing and desiccation  of L3-4 and L4-5. Conus at T12-L1. Axial imaging correlation:    T12-L1:  Patent canal and foramina. L1-L2: Patent canal and foramina. L2-L3: Patent canal and foramina. L3-L4: Broad-based disc osteophyte complex. Bilateral facet arthropathy with  ligamentum flavum thickening and buckling. Moderate concentric spinal stenosis. Particular narrowing of lateral recesses with transient distortion of the  crossing L4 nerves. Axial T2 image 20. Patent foramina.  Progression of  degenerative findings. L4-L5: Postoperative level. Mild broad-based disc osteophyte complex with a  small amount of enhancing scar tissue. Hypertrophic facet arthropathy. Moderate  spinal stenosis. Narrowing of the lateral recesses left more than right. Transient distortion of the crossing nerves particularly left L5. Axial T2 image  13. Mild foraminal stenoses.  Unchanged. L5-S1: Postoperative level. Broad-based disc osteophyte complex with enhancing  scar tissue centrally. Hypertrophic facet arthropathy.  No significant spinal  stenosis. Moderately severe bilateral foraminal stenoses. Unchanged. Incidental imaging of regional soft tissues unremarkable.                  Impression IMPRESSION:    1. Some progression of degenerative disc and facet disease at L3-4, with  moderate spinal stenosis and potentially impingement of the crossing L4 nerves,  left more than right. 2. Stable postsurgical and degenerative findings at L4-5 and L5-S1.             Bilateral knee x-rays from 03/14/2017 demonstrated:  Medial compartment narrowing bilaterally, L>R         Cervical Spine MRI from 12/19/2016 demonstrated:  Results from Hospital Encounter on 12/19/16   MRI CERV SPINE WO CONT     Narrative MRI of cervical spine without contrast    HISTORY: Chronic progressive neck pain with headaches. COMPARISON: No prior MRI. Cervical spine radiographs from 12/1/2016. TECHNIQUE: T1 weighted, T2 FSE, FSE inversion recovery sagittal images are  supplemented by T2 weighted and GRE/medic axial images. FINDINGS: Normal alignment and vertebral body heights. Normal marrow signal  except for mild endplate degenerative change. Cervical cord is normal in signal  and caliber. Visualized posterior fossa contents look normal. Paraspinal soft  tissues look normal.    C2-3: No significant degenerative disc disease or spinal stenosis. C3-4: Small nonfocal disc protrusion which contacts the ventral cord and causes  borderline spinal stenosis. No foraminal stenosis. C4-5: No significant degenerative disc disease. Mildly hypertrophic facets. No  spinal stenosis. C5-6: Disc is narrowed with diffusely bulging disc and osteophyte complex. Facets are mildly hypertrophic. Mild spinal canal and moderate left foraminal  stenoses. C6-7: Disc is narrowed with diffusely bulging disc and osteophyte complex. Facets are mildly hypertrophic. Mild spinal canal and moderate bilateral  foraminal stenoses.     C7-T1: No significant degenerative disc disease or spinal stenosis.               Impression Impression:    Disc osteophyte complexes causing mild spinal canal stenoses at C5-6 and C6-7. Moderate left foraminal stenosis at C5-6 and moderate bilateral foraminal  stenoses at C6-7.        Written by Riki Moritz, as dictated by Gato Lin MD.  I, Dr. Gato Lin confirm that all documentation is accurate.

## 2019-07-22 NOTE — LETTER
7/23/19 Patient: Isatu Santos YOB: 1955 Date of Visit: 7/22/2019 Kavon Porras MD 
[address] [place] [address] VIA In Basket Dear Kavon Porras MD, Thank you for referring Mr. Isatu Santos to [facility] for evaluation. My notes for this consultation are attached. If you have questions, please do not hesitate to call me. I look forward to following your patient along with you. Sincerely, Clovis Dumont MD

## 2019-07-23 RX ORDER — LISINOPRIL AND HYDROCHLOROTHIAZIDE 12.5; 2 MG/1; MG/1
TABLET ORAL
Qty: 90 TAB | Refills: 0 | Status: SHIPPED | OUTPATIENT
Start: 2019-07-23 | End: 2019-10-14 | Stop reason: SDUPTHER

## 2019-07-24 ENCOUNTER — HOSPITAL ENCOUNTER (OUTPATIENT)
Dept: PHYSICAL THERAPY | Age: 64
Discharge: HOME | End: 2019-07-24
Payer: MEDICARE

## 2019-07-24 ENCOUNTER — APPOINTMENT (OUTPATIENT)
Dept: PHYSICAL THERAPY | Age: 64
End: 2019-07-24
Payer: MEDICARE

## 2019-07-24 PROCEDURE — 97018 PARAFFIN BATH THERAPY: CPT

## 2019-07-24 PROCEDURE — 97110 THERAPEUTIC EXERCISES: CPT

## 2019-07-24 PROCEDURE — 97140 MANUAL THERAPY 1/> REGIONS: CPT

## 2019-07-24 NOTE — PROGRESS NOTES
OT DAILY TREATMENT NOTE 10-18    Patient Name: Charlie Snyder  Date:2019  : 1955  [x]  Patient  Verified  Payor: Lyndsay Angeles / Plan: [place] MEDICARE PART A & B / Product Type: Medicare /    In time:315  Out time:400  Total Treatment Time (min): 45  Visit #: 6 of 10    Medicare/BCBS Only   Total Timed Codes (min):  35 1:1 Treatment Time:  45     Treatment Area: Pain in right hand [M79.641]  Pain in left hand [M79.642]    SUBJECTIVE  Pain Level (0-10 scale): Right 2-3/10, Left 1/10  Any medication changes, allergies to medications, adverse drug reactions, diagnosis change, or new procedure performed?: [x] No    [] Yes (see summary sheet for update)  Subjective functional status/changes:   [] No changes reported  My right hand is doing better but it still hurts more than my left.      OBJECTIVE  Modality rationale: decrease pain to improve the patients ability to grasp   Min Type Additional Details      [] Estim:  []Unatt       []IFC  []Premod                        []Other:  []w/ice   []w/heat  Position:  Location:      [] Estim: []Att    []TENS instruct  []NMES                    []Other:  []w/US   []w/ice   []w/heat  Position:  Location:      []  Traction: [] Cervical       []Lumbar                       [] Prone          []Supine                       []Intermittent   []Continuous Lbs:  [] before manual  [] after manual      []  Ultrasound: []Continuous   [] Pulsed                           []1MHz   []3MHz W/cm2:  Location:      []  Iontophoresis with dexamethasone         Location: [] Take home patch   [] In clinic      []  Ice     []  heat  []  Ice massage  []  Laser   []  Anodyne Position:  Location:       10 []  Laser with stim  [x]  Other:paraffin  Position:  Location:both hands      []  Vasopneumatic Device Pressure:       [] lo [] med [] hi   Temperature: [] lo [] med [] hi        [x] Skin assessment post-treatment:  [x]intact []redness- no adverse reaction    []redness - adverse reaction:     35 min Therapeutic Exercise:  [] See flow sheet :   Rationale: increase ROM and increase strength to improve the patients ability to     Medium Putty 10/10 each hand   Red Therabar: 3 ways: 15x each  Yellow Digiflex \"O\" 15x each hand     With   [] TE   [] TA   [] neuro   [] other: Patient Education: [x] Review HEP    [] Progressed/Changed HEP based on:   [] positioning   [] body mechanics   [] transfers   [] heat/ice application   [] Splint wear/care   [] Sensory re-education   [] scar management      [] other:            Other Objective/Functional Measures:     Cueing needed for thumb placement during digiflex exercise      Pain Level (0-10 scale) post treatment: B Hands 1.5/10    ASSESSMENT/Changes in Function: strength improving     Patient will continue to benefit from skilled OT services to modify and progress therapeutic interventions, address ROM deficits, address strength deficits and instruct in home and community integration to attain remaining goals. []  See Plan of Care  []  See progress note/recertification  []  See Discharge Summary         Progress towards goals / Updated goals:  2.  Patient will be independent in home program to increase thumb abduciton in right and both digit flexion. Met with HEP 7/9/19  3.  Patient will be introduced to joint protection strategies and adaptive equipment to improve self and home care and reduce pain. Met 7/24/19  4.  Patient will be independent in home program for hand strengthening Met 7/24/19     LTGs   1.  Patient will report pain diminished to 0-2/10 with routine use Progressing 7/15/19, met today 7/17/19  2.  Patient will increase  strength in both hand by 10 pounds to increase ease of opening jars Progressing with in clinic activities 7/24/19  3.  Patient will incorporate adaptive equipment and strategies to improve independence in self care tasks such as buttoning, cutting, writing etc. Pt reports being able to incorporate adaptive equipment and strategies into ADL/IADL 7/24/19    PLAN  []  Upgrade activities as tolerated     [x]  Continue plan of care  []  Update interventions per flow sheet       []  Discharge due to:_  []  Other:_      Boyd Piper ,HARVEY/L 7/24/2019  9:56 AM    Future Appointments   Date Time Provider Dex [place]   7/25/2019 10:00 AM Clarke Marinelli, PT [id number] [facility]   7/25/2019 11:15 AM Jaimie Bernstein, OTR/L [id number] [facility]   7/29/2019 11:00 AM Perla Seip [id number] [facility]   7/29/2019 12:30 PM Clarke Marinelli, PT [id number] [facility]   7/31/2019 11:00 AM Perla Seip SZHEBKM [facility]   7/31/2019 12:30 PM Clarke Marinelli, PT [id number] [facility]   9/11/2019 11:30 AM Donita Anthony MD ABMA-MO ROXANNE SCHED   10/2/2019  1:00 PM Martha Chinchilla MD [facility]   10/25/2019  8:30 AM Hanh Snider PA-C VS ROXANNE SCHED   11/4/2019  3:30 PM Sultana Fragoso  [address]

## 2019-07-24 NOTE — PROGRESS NOTES
PT DAILY TREATMENT NOTE 10-18    Patient Name: Charlie Snyder  Date:2019  : 1955  [x]  Patient  Verified  Payor: Lyndsay Angeles / Plan: [place] MEDICARE PART A & B / Product Type: Medicare /    In time: 2:33  Out time:3:17  Total Treatment Time (min):44  Visit #: 6 of     Medicare/BCBS Only   Total Timed Codes (min):  44 1:1 Treatment Time:  39       Treatment Area: Presence of left artificial knee joint [Z96.652]  Iliotibial band syndrome, left leg [M76.32]    SUBJECTIVE  Pain Level (0-10 scale): 3/10  Any medication changes, allergies to medications, adverse drug reactions, diagnosis change, or new procedure performed?: [x] No    [] Yes (see summary sheet for update)  Subjective functional status/changes:   [] No changes reported  Pt reports he continues to have pain with sleeping and standing after prolonged sitting. He notes he is working on exercises at home. The MD follow up went well and he was told things were much better. He still has occasional buckling and loss of balance when turning/pivoting on the left leg.      OBJECTIVE      34 min Therapeutic Exercise:  [x] See flow sheet :   Rationale: increase ROM and increase strength to improve the patients ability to increase ease with ADLs    10 minutes to assess alignment; pt with right upslip corrected with leg lengthening, MET for right AI, shotgun technique x 2 for correct alignment to aide with ambulation and sit to stands after prolonged sitting    With   [x] TE   [] TA   [] neuro   [] other: Patient Education: [x] Review HEP    [] Progressed/Changed HEP based on:   [] positioning   [] body mechanics   [] transfers   [] heat/ice application    [] other:      Other Objective/Functional Measures:   Reduced pain post correction to 1/10  Noted instability with sit to stands at screw home mechanism  Updated exercises for quad stability and pes anserine strengthening  Fewer TPs along ITB and pt with good understanding of glute and ITB rolling  Pt with new shoes and wearing new orthotics  Improved hip alignment with sit to stands    Pain Level (0-10 scale) post treatment: 1/10    ASSESSMENT/Changes in Function: Pt progressing with improved knee ROM and strength. He continues to have most pain after prolonged sitting along the lateral left knee. Pt did have pelvic obliquity and expect knee instability with screw home mechanism to be contributing to some buckling issues with pivoting. Will work on general stability and monitor alignment for improved ease of sit to stands and ambulation with decreased fall risk or buckling. Progress towards goals / Updated goals:  1. Pt will be independent with HEP to maintain progression.  MET (6/17/19)     Long term goals: To be accomplished within 8 weeks  1. Pt will improve FOTO score by 10 points to 57/100 to show improvement with functional mobility performance. 2. Pt will have Left knee AROM improved to 0-115 degs at least to amb household and community with normal and safe pattern. Not met: 106 degrees (6/21/19)  3. Pt will have B hip abd, ext strength improved to at least 4/5 to be able to amb longer distance and navigate stair safely. Slowly progressing. 7/17/19  4.  Pt will report no more than 1-2/10 pain at worst so he can amb and perform ADLs with ease.     PLAN  [x]  Upgrade activities as tolerated     [x]  Continue plan of care  []  Update interventions per flow sheet       []  Discharge due to:_  []  Other:_      Mary Valiente, BIRGIT 7/24/2019  3:22 PM    Future Appointments   Date Time Provider Dex Hatfield   7/25/2019 10:00 AM Steve Bergman, PT [id number] [facility]   7/25/2019 11:15 AM TEODORO Colindres/L [id number] [facility]   7/29/2019 11:00 AM Nafisa Cano [id number] [facility]   7/29/2019 12:30 PM Steve Medal, PT [id number] [facility]   7/31/2019 11:00 AM Nafisajazmine Cano [id number] [facility]   7/31/2019 12:30 PM Hamp Medal, PT [id number] [facility]   9/11/2019 11:30 AM MD [facility]   10/2/2019 1:00 PM Given, Verita Duane, MD [id number]   10/25/2019  8:30 AM ESPERANZA [address]   11/4/2019  3:30 PM Edel Figueredo  [address]

## 2019-07-25 ENCOUNTER — HOSPITAL ENCOUNTER (OUTPATIENT)
Dept: PHYSICAL THERAPY | Age: 64
Discharge: HOME | End: 2019-07-25
Payer: MEDICARE

## 2019-07-25 PROCEDURE — 97110 THERAPEUTIC EXERCISES: CPT

## 2019-07-25 PROCEDURE — 97018 PARAFFIN BATH THERAPY: CPT

## 2019-07-25 NOTE — PROGRESS NOTES
PT DAILY TREATMENT NOTE 10-18    Patient Name: Norval Najjar  Date:2019  : 1955  [x]  Patient  Verified  Payor: [place] MEDICARE / Plan: [place] MEDICARE PART A & B / Product Type: Medicare /    In time:1000  Out time:1034  Total Treatment Time (min): 34  Visit #: 7 of     Medicare/BCBS Only   Total Timed Codes (min):  34 1:1 Treatment Time:  30       Treatment Area: Presence of left artificial knee joint [Z96.652]  Iliotibial band syndrome, left leg [M76.32]    SUBJECTIVE  Pain Level (0-10 scale): 2/10  Any medication changes, allergies to medications, adverse drug reactions, diagnosis change, or new procedure performed?: [x] No    [] Yes (see summary sheet for update)  Subjective functional status/changes:   [] No changes reported  Patient reports he continues to do his hip strengthening exercises at home. OBJECTIVE       29 min Therapeutic Exercise:  [x] See flow sheet :   Rationale: increase ROM, increase strength, improve coordination and improve balance to improve the patients ability to increase ease of ambulation. 5 minutes Manual: right upslip corrected with leg lengthening, MET and shotgun for right AI to correct pelvic alignment in order to reduce pain with ambulation. With   [] TE   [] TA   [] neuro   [] other: Patient Education: [x] Review HEP    [] Progressed/Changed HEP based on:   [] positioning   [] body mechanics   [] transfers   [] heat/ice application    [] other:      Other Objective/Functional Measures:   Initiated updated there-ex per flow sheet  B UE in parallel bars for 4\" step down  Quad/vmo weakness evident       Pain Level (0-10 scale) post treatment: 1/10    ASSESSMENT/Changes in Function: Patient is challenged with initiation of updated flow sheet. He is challenged with SAQ( no weights) and SLR ( 2#). Patient continues to present with pelvic obliquity corrected with manual intervention.  Pt is more conscious of shifting weight more to left LE for equal weight distribution. Will continue to address left quadriceps/VMO strength in order to reduce buckling and increase ease of transfers. Patient will continue to benefit from skilled PT services to modify and progress therapeutic interventions, address functional mobility deficits, address ROM deficits, address strength deficits, analyze and address soft tissue restrictions and analyze and cue movement patterns to attain remaining goals. [x]  See Plan of Care  []  See progress note/recertification  []  See Discharge Summary         Progress towards goals / Updated goals:  1. Pt will be independent with HEP to maintain progression.  MET (6/17/19)     Long term goals: To be accomplished within 8 weeks  1. Pt will improve FOTO score by 10 points to 57/100 to show improvement with functional mobility performance. 2. Pt will have Left knee AROM improved to 0-115 degs at least to amb household and community with normal and safe pattern. Not met: 106 degrees (6/21/19)  3. Pt will have B hip abd, ext strength improved to at least 4/5 to be able to amb longer distance and navigate stair safely. Slowly progressing. 7/17/19  4.  Pt will report no more than 1-2/10 pain at worst so he can amb and perform ADLs with ease.      PLAN  []  Upgrade activities as tolerated     [x]  Continue plan of care  [x]  Update interventions per flow sheet       []  Discharge due to:_  []  Other:_      Brent Bolden PT 7/25/2019  8:01 AM    Future Appointments   Date Time Provider Dex Hat[place]   7/25/2019 10:00 AM Steve Bergman, GALILEO ACEVEDOPTLIBRADO [facility]   7/25/2019 11:15 AM TEODORO Colindres/L MMCPTPB [facility]   7/29/2019 11:00 AM Nafisa Cano [id number] [facility]   7/29/2019 12:30 PM Steve Bergman, PT COY [facility]   7/31/2019 11:00 AM Nafisa Cano [id number] [facility]   7/31/2019 12:30 PM Steve Bergman, GALILEO PAINTING [facility]   9/11/2019 11:30 AM [facility]unj Miranda [facility]   10/2/2019  1:00 PM Given, Johnie Longo MD [facility]   10/25/2019 8:30 AM ESPERANZA Reynolds [address]   11/4/2019  3:30 PM Alden Mcfadden  [address]

## 2019-07-25 NOTE — PROGRESS NOTES
OT DAILY TREATMENT NOTE 10-18    Patient Name: Jose Clarke  Date:2019  : 1955  [x]  Patient  Verified  Payor: VA MEDICARE / Plan: VA MEDICARE PART A & B / Product Type: Medicare /    In time:1115  Out time:1200  Total Treatment Time (min): 45  Visit #: 7 of 16    Medicare/BCBS Only   Total Timed Codes (min):  35 1:1 Treatment Time:  45     Treatment Area: Pain in right hand [M79.641]  Pain in left hand [M79.642]    SUBJECTIVE  Pain Level (0-10 scale): 2-3/10  Any medication changes, allergies to medications, adverse drug reactions, diagnosis change, or new procedure performed?: [x] No    [] Yes (see summary sheet for update)  Subjective functional status/changes:   [] No changes reported  *Must be the weather**  Can turn key well now    OBJECTIVE    Modality rationale: decrease pain and increase tissue extensibility to improve the patients ability to grasp   Min Type Additional Details    [] Estim:  []Unatt       []IFC  []Premod                        []Other:  []w/ice   []w/heat  Position:  Location:    [] Estim: []Att    []TENS instruct  []NMES                    []Other:  []w/US   []w/ice   []w/heat  Position:  Location:    []  Traction: [] Cervical       []Lumbar                       [] Prone          []Supine                       []Intermittent   []Continuous Lbs:  [] before manual  [] after manual    []  Ultrasound: []Continuous   [] Pulsed                           []1MHz   []3MHz W/cm2:  Location:    []  Iontophoresis with dexamethasone         Location: [] Take home patch   [] In clinic    []  Ice     []  heat  []  Ice massage  []  Laser   []  Anodyne Position:  Location:    []  Laser with stim  [x]  Other: Paraffin 10 mins Position:  Location:both hands    []  Vasopneumatic Device Pressure:       [] lo [] med [] hi   Temperature: [] lo [] med [] hi       [x] Skin assessment post-treatment:  [x]intact []redness- no adverse reaction    []redness - adverse reaction:       35 min Therapeutic Exercise:  [] See flow sheet :   Rationale: increase strength to improve the patients ability to grasp  Changed to green therabar x 10 each both   Chinese balls both hands x 20   Med putty and pegs right hand x 10  Yellow digiflex O x 10 both hands  Index finger radial isometrics x 10 both hands       With   [] TE   [] TA   [] neuro   [] other: Patient Education: [x] Review HEP    [] Progressed/Changed HEP based on: golf ball swap, index finger isometrics  [] positioning   [] body mechanics   [] transfers   [] heat/ice application   [] Splint wear/care   [] Sensory re-education   [] scar management      [] other:            Other Objective/Functional Measures:   Difficulty with O with right hand     Pain Level (0-10 scale) post treatment: 11/2 right, 2/10 left    ASSESSMENT/Changes in Function: Improving pain, strength    Patient will continue to benefit from skilled OT services to modify and progress therapeutic interventions, address strength deficits, analyze and cue movement patterns and instruct in home and community integration to attain remaining goals. []  See Plan of Care  []  See progress note/recertification  []  See Discharge Summary         Progress towards goals / Updated goals:  **2.  Patient will be independent in home program to increase thumb abduciton in right and both digit flexion. Met with HEP 7/9/19  3.  Patient will be introduced to joint protection strategies and adaptive equipment to improve self and home care and reduce pain. Progressing with in clinic tasks/Pt education 7/3/19, incorporating in gardening 7/25/19  4.  Patient will be independent in home program for hand strengthening Medium Putty HEP administered on this date 7/3/19, added isometric index finger exercise and golf ball swap 7/25/19     LTGs   1.  Patient will report pain diminished to 0-2/10 with routine use Progressing 7/15/19, met today 7/17/19  2.  Patient will increase  strength in both hand by 10 pounds to increase ease of opening jars Progressing with in clinic activities 7/15/19  3.  Patient will incorporate adaptive equipment and strategies to improve independence in self care tasks such as buttoning, cutting, writing etc. Education on Joint protective strategies/Techniques given on this date 7/12/19, using for gardening 7/25/19    PLAN  []  Upgrade activities as tolerated     [x]  Continue plan of care  []  Update interventions per flow sheet       []  Discharge due to:_  []  Other:_      Jonelle Taveras OTR/L 7/25/2019  11:18 AM    Future Appointments   Date Time Provider Dex Alysia   7/29/2019 11:00 AM Abhilash Wallace [id number] [facility]   7/29/2019 12:30 PM Mercedes Parmar, PT [id number] [facility]   7/31/2019 11:00 AM Abhilash Wallace TVOVNET [facility]   7/31/2019 12:30 PM Mercedes Parmar, PT [email or web address] [facility]   9/23/2019  3:45 PM [facility] Aceved[facility]   10/2/2019  1:00 PM Given, Apoorva Haynes MD [address]   10/25/2019  8:30 AM ESPERANZA Lopez [address]   11/4/2019  3:30 PM Mateo Espinoza  [address]

## 2019-07-25 NOTE — PROGRESS NOTES
OT DAILY TREATMENT NOTE 10-18    Patient Name: Blas Reading  Date:2019  : 1955  [x]  Patient  Verified  Payor: VA MEDICARE / Plan: VA MEDICARE PART A & B / Product Type: Medicare /    In time:***  Out time:***  Total Treatment Time (min): ***  Visit #: *** of ***    Medicare/BCBS Only   Total Timed Codes (min):  *** 1:1 Treatment Time:  ***     Treatment Area: Pain in right hand [M79.641]  Pain in left hand [M79.642]    SUBJECTIVE  Pain Level (0-10 scale): ***  Any medication changes, allergies to medications, adverse drug reactions, diagnosis change, or new procedure performed?: [x] No    [] Yes (see summary sheet for update)  Subjective functional status/changes:   [] No changes reported  ***    OBJECTIVE    Modality rationale: {BSHSI INMOTION MODALITIES:20833} to improve the patients ability to ***   Min Type Additional Details    [] Estim:  []Unatt       []IFC  []Premod                        []Other:  []w/ice   []w/heat  Position:  Location:    [] Estim: []Att    []TENS instruct  []NMES                    []Other:  []w/US   []w/ice   []w/heat  Position:  Location:    []  Traction: [] Cervical       []Lumbar                       [] Prone          []Supine                       []Intermittent   []Continuous Lbs:  [] before manual  [] after manual    []  Ultrasound: []Continuous   [] Pulsed                           []1MHz   []3MHz W/cm2:  Location:    []  Iontophoresis with dexamethasone         Location: [] Take home patch   [] In clinic    []  Ice     []  heat  []  Ice massage  []  Laser   []  Anodyne Position:  Location:    []  Laser with stim  []  Other:  Position:  Location:    []  Vasopneumatic Device Pressure:       [] lo [] med [] hi   Temperature: [] lo [] med [] hi       [] Skin assessment post-treatment:  []intact []redness- no adverse reaction    []redness  adverse reaction:     *** min []Eval                  []Re-Eval       *** min Therapeutic Exercise:  [] See flow sheet : Rationale: {BSHSI IMMOTION THER EX:95682} to improve the patients ability to ***    *** min Therapeutic Activity:  []  See flow sheet :   Rationale: {BSHSI IMMOTION THER EX:35581}  to improve the patients ability to ***     *** min Neuromuscular Re-education:  []  See flow sheet :   Rationale: {BSHSI IMMOTION THER EX:14566}  to improve the patients ability to ***    *** min Manual Therapy:  ***   Rationale: {BSHSI IMMOTION MANUAL THERAPY:16181} to ***     min Orthotic/Splinting: ***   Rationale: {BSHSI IMMOTION THER EX:08320}  to improve the patients ability to ***    *** min Self Care/Home Management: ***   Rationale: {BSHSI IMMOTION THER EX:93818}  to improve the patients ability to ***    With   [] TE   [] TA   [] neuro   [] other: Patient Education: [x] Review HEP    [] Progressed/Changed HEP based on:   [] positioning   [] body mechanics   [] transfers   [] heat/ice application   [] Splint wear/care   [] Sensory re-education   [] scar management      [] other:            Other Objective/Functional Measures: ***     Pain Level (0-10 scale) post treatment: ***    ASSESSMENT/Changes in Function: ***    Patient will continue to benefit from skilled OT services to {[facility] INMOTION ASSESSMENT STATEMENTS:20159} to attain remaining goals.      []  See Plan of Care  []  See progress note/recertification  []  See Discharge Summary         Progress towards goals / Updated goals:  ***    PLAN  []  Upgrade activities as tolerated     []  Continue plan of care  []  Update interventions per flow sheet       []  Discharge due to:_  []  Other:_      Javy Pruett OTR/L 7/25/2019  11:15 AM    Future Appointments   Date Time Provider Dex [place]   7/29/2019 11:00 AM Meena Bowers [id number] [facility]   7/29/2019 12:30 PM Magdalena Pineda PT [id number] [facility]   7/31/2019 11:00 AM Meena Bowers YWHDEFE [facility]   7/31/2019 12:30 PM Magdalena Pineda PT [id number] [facility]   9/23/2019  3:45 PM MD CAR[facility] [address] 10/2/2019  1:00 PM Given, Vincent Morales MD [address] [facility]   10/25/2019  8:30 AM Janeen Simons PA-C [facility] [address]   11/4/2019  3:30 PM Dimas Marti  [address]

## 2019-07-29 ENCOUNTER — HOSPITAL ENCOUNTER (OUTPATIENT)
Dept: PHYSICAL THERAPY | Age: 64
Discharge: HOME | End: 2019-07-29
Payer: MEDICARE

## 2019-07-29 ENCOUNTER — APPOINTMENT (OUTPATIENT)
Dept: PHYSICAL THERAPY | Age: 64
End: 2019-07-29
Payer: MEDICARE

## 2019-07-29 PROCEDURE — 97110 THERAPEUTIC EXERCISES: CPT

## 2019-07-29 PROCEDURE — 97018 PARAFFIN BATH THERAPY: CPT

## 2019-07-29 NOTE — PROGRESS NOTES
PT DAILY TREATMENT NOTE 10-18    Patient Name: Sarahi Frame  Date:2019  : 1955  [x]  Patient  Verified  Payor: VA MEDICARE / Plan: VA MEDICARE PART A & B / Product Type: Medicare /    In time:1230 Out time:108  Total Treatment Time (min): 38  Visit #: 8 of 16    Medicare/BCBS Only   Total Timed Codes (min):  38 1:1 Treatment Time:  38       Treatment Area: Presence of left artificial knee joint [Z96.652]  Iliotibial band syndrome, left leg [M76.32]    SUBJECTIVE  Pain Level (0-10 scale): 3/10  Any medication changes, allergies to medications, adverse drug reactions, diagnosis change, or new procedure performed?: [x] No    [] Yes (see summary sheet for update)  Subjective functional status/changes:   [] No changes reported  Patient reports his pain has shifted from lateral left knee to, below knee cap to medial knee. Pt also reports his left knee buckled on him the other day when rushing out of the house, did not fall; although he states the buckling is much less frequent as opposed to when he first started PT.     OBJECTIVE       33 min Therapeutic Exercise:  [x] See flow sheet :   Rationale: increase ROM, increase strength, improve coordination and improve balance to improve the patients ability to increase ease of ambulation. 5 minutes Manual: KT to facilitate pes anserinus, space correction to patellar tendon  Rationale: to reduce pain in order to increase ease of ambulation. With   [] TE   [] TA   [] neuro   [] other: Patient Education: [x] Review HEP    [] Progressed/Changed HEP based on:   [] positioning   [] body mechanics   [] transfers   [] heat/ice application    [] other:      Other Objective/Functional Measures:   TTP patellar tendon and pes anserine  Pt challenged with left SLS, needing 1 UE support         Pain Level (0-10 scale) post treatment: 1/10    ASSESSMENT/Changes in Function: Patient is slowly progressing towards goals.  He continues to demonstrate decreased left quad/VMO strength. Pt is TTP along left patellar tendon an pes anserinus, will trial KT. Will continue to address left knee strength and balance in order to reduce buckling and increase safety with stair negotiation. Patient will continue to benefit from skilled PT services to modify and progress therapeutic interventions, address functional mobility deficits, address ROM deficits, address strength deficits, analyze and address soft tissue restrictions and analyze and cue movement patterns to attain remaining goals. [x]  See Plan of Care  []  See progress note/recertification  []  See Discharge Summary         Progress towards goals / Updated goals:  1. Pt will be independent with HEP to maintain progression.  MET (6/17/19)     Long term goals: To be accomplished within 8 weeks  1. Pt will improve FOTO score by 10 points to 57/100 to show improvement with functional mobility performance. 2. Pt will have Left knee AROM improved to 0-115 degs at least to amb household and community with normal and safe pattern. Not met: 106 degrees (6/21/19)  3. Pt will have B hip abd, ext strength improved to at least 4/5 to be able to amb longer distance and navigate stair safely. Slowly progressing. 7/17/19  4.  Pt will report no more than 1-2/10 pain at worst so he can amb and perform ADLs with ease.      PLAN  []  Upgrade activities as tolerated     [x]  Continue plan of care  []  Update interventions per flow sheet       []  Discharge due to:_  []  Other:_      Oj Zelaya, PT 7/29/2019  8:01 AM    Future Appointments   Date Time Provider Dex Hatfield   7/31/2019 11:00 AM Margarito Payne ZGRINAT [facility]   7/31/2019 12:30 PM Wicho Ser, PT EGKPYAP [facility]   8/5/2019  3:15 PM Margarito Payne NYBVRGY [facility]   8/5/2019  4:00 PM Nabor Novak PTA MMCPTPB [facility]   8/7/2019 11:45 AM Margarito Payne SUFYIXD [facility]   8/7/2019 12:30 PM Nabor Novak PTA MMCPTPB [facility]   8/12/2019  4:00 PM Nabor Novak PTA MMCPTPB [facility]   8/12/2019  4:45 PM Cameron Delaneyer, OTR/L MMCPTPB [facility]   8/14/2019  3:30 PM Salomón Martinez, PTA MMCPTPB [facility]   8/14/2019  4:00 PM Magdy QuinterosUGB [facility]   9/23/2019  3:45 PM Arya Durán MD [facility] ROXANNE SCHED   10/2/2019  1:00 PM Jeremie Chinchilla MD [address]   10/25/2019  8:30 AM Luis Gregory PA-C [facility] ROXANNE SCHED   11/4/2019  3:30 PM Mary Gardner  [address]

## 2019-07-29 NOTE — PROGRESS NOTES
OT DAILY TREATMENT NOTE 10-18    Patient Name: Jean Whitney  Date:2019  : 1955  [x]  Patient  Verified  Payor: VA MEDICARE / Plan: VA MEDICARE PART A & B / Product Type: Medicare /    In time:1101  Out time:1149  Total Treatment Time (min): 49  Visit #: 8 of 16    Medicare/BCBS Only   Total Timed Codes (min):  39 1:1 Treatment Time:  49     Treatment Area: Pain in right hand [M79.641]  Pain in left hand [M79.642]    SUBJECTIVE  Pain Level (0-10 scale): B Hands 2/10  Any medication changes, allergies to medications, adverse drug reactions, diagnosis change, or new procedure performed?: [x] No    [] Yes (see summary sheet for update)  Subjective functional status/changes:   [] No changes reported  They hurt the most in the morning when I wake up.     OBJECTIVE          Modality rationale: decrease pain and increase tissue extensibility to improve the patients ability to grasp   Min Type Additional Details      [] Estim:  []Unatt       []IFC  []Premod                        []Other:  []w/ice   []w/heat  Position:  Location:      [] Estim: []Att    []TENS instruct  []NMES                    []Other:  []w/US   []w/ice   []w/heat  Position:  Location:      []  Traction: [] Cervical       []Lumbar                       [] Prone          []Supine                       []Intermittent   []Continuous Lbs:  [] before manual  [] after manual      []  Ultrasound: []Continuous   [] Pulsed                           []1MHz   []3MHz W/cm2:  Location:      []  Iontophoresis with dexamethasone         Location: [] Take home patch   [] In clinic      []  Ice     []  heat  []  Ice massage  []  Laser   []  Anodyne Position:  Location:     10 []  Laser with stim  [x]  Other: Paraffin 10 mins Position:  Location:both hands      []  Vasopneumatic Device Pressure:       [] lo [] med [] hi   Temperature: [] lo [] med [] hi       [x] Skin assessment post-treatment:  [x]intact []redness- no adverse reaction    []redness - adverse reaction:     39 min Therapeutic Exercise:  [] See flow sheet :   Rationale: increase ROM and increase strength to improve the patients ability to     Index Finger: Radial Isometric: 10x each hand   Green Therabar: 3 ways  15x each   Dexterity: 20x , B Hands   Medium Theraputty: 10/10 each hand           With    [] TE   [] TA   [] neuro   [] other: Patient Education: [x] Review HEP    [] Progressed/Changed HEP based on:   [] positioning   [] body mechanics   [] transfers   [] heat/ice application   [] Splint wear/care   [] Sensory re-education   [] scar management      [] other:            Other Objective/Functional Measures:     Able to tolerate increase in reps with therabar with no c/o pain    Pain Level (0-10 scale) post treatment: B Hands 1/10    ASSESSMENT/Changes in Function: Strength improving     Patient will continue to benefit from skilled OT services to modify and progress therapeutic interventions, address ROM deficits, address strength deficits and instruct in home and community integration to attain remaining goals. []  See Plan of Care  []  See progress note/recertification  []  See Discharge Summary         Progress towards goals / Updated goals:  2.  Patient will be independent in home program to increase thumb abduciton in right and both digit flexion. Met with HEP 7/9/19  3.  Patient will be introduced to joint protection strategies and adaptive equipment to improve self and home care and reduce pain. Met 7/29/19  4.  Patient will be independent in home program for hand strengthening Met 7/29/19     LTGs   1.  Patient will report pain diminished to 0-2/10 with routine use  met today 7/17/19, 7/29/19  2.  Patient will increase  strength in both hand by 10 pounds to increase ease of opening jars Progressing with in clinic activities 7/29/19  3.  Patient will incorporate adaptive equipment and strategies to improve independence in self care tasks such as buttoning, cutting, writing etc. Met 7/29/19      PLAN  []  Upgrade activities as tolerated     [x]  Continue plan of care  []  Update interventions per flow sheet       []  Discharge due to:_  []  Other:_      CaitlynWES Painting/L 7/29/2019  9:28 AM    Future Appointments   Date Time Provider Dex Alysia   7/29/2019 11:00 AM Abhilash Wallace KQBANPT [facility]   7/29/2019 12:30 PM Mercedes Parmar, PT [id number] [facility]   7/31/2019 11:00 AM Abhilash Wallace [id number] [facility]   7/31/2019 12:30 PM Mercedes Parmar, PT DSRLEEK [facility]   9/23/2019  3:45 PM Chris Acevedo MD [facility] ROXANNE SCHED   10/2/2019  1:00 PM Given, MD Cliff Crandall   10/25/2019  8:30 AM Monroe Pennington PA-C [facility] ROXANNE SCHED   11/4/2019  3:30 PM Mateo Espinoza  [address]

## 2019-07-31 ENCOUNTER — APPOINTMENT (OUTPATIENT)
Dept: PHYSICAL THERAPY | Age: 64
End: 2019-07-31
Payer: MEDICARE

## 2019-07-31 ENCOUNTER — HOSPITAL ENCOUNTER (OUTPATIENT)
Dept: PHYSICAL THERAPY | Age: 64
Discharge: HOME | End: 2019-07-31
Payer: MEDICARE

## 2019-07-31 PROCEDURE — 97110 THERAPEUTIC EXERCISES: CPT

## 2019-07-31 PROCEDURE — 97018 PARAFFIN BATH THERAPY: CPT

## 2019-07-31 NOTE — PROGRESS NOTES
OT DAILY TREATMENT NOTE 10-18    Patient Name: Luis Pete  Date:2019  : 1955  [x]  Patient  Verified  Payor: [place] MEDICARE / Plan: [place] MEDICARE PART A & B / Product Type: Medicare /    In time:1100  Out time:1145  Total Treatment Time (min): 45  Visit #: 9 of 16    Medicare/BCBS Only   Total Timed Codes (min):  25 1:1 Treatment Time:  45     Treatment Area: Pain in right hand [M79.641]  Pain in left hand [M79.642]    SUBJECTIVE  Pain Level (0-10 scale): B Hands 3/10  Any medication changes, allergies to medications, adverse drug reactions, diagnosis change, or new procedure performed?: [x] No    [] Yes (see summary sheet for update)  Subjective functional status/changes:   [] No changes reported  Reports some difficulty with buttoning pants     OBJECTIVE  Modality rationale: decrease pain and increase tissue extensibility to improve the patients ability to grasp   Min Type Additional Details       [] Estim:  []Unatt       []IFC  []Premod                        []Other:  []w/ice   []w/heat  Position:  Location:       [] Estim: []Att    []TENS instruct  []NMES                    []Other:  []w/US   []w/ice   []w/heat  Position:  Location:       []  Traction: [] Cervical       []Lumbar                       [] Prone          []Supine                       []Intermittent   []Continuous Lbs:  [] before manual  [] after manual       []  Ultrasound: []Continuous   [] Pulsed                           []1MHz   []3MHz W/cm2:  Location:       []  Iontophoresis with dexamethasone         Location: [] Take home patch   [] In clinic       []  Ice     []  heat  []  Ice massage  []  Laser   []  Anodyne Position:  Location:      10 []  Laser with stim  [x]  Other: Paraffin 10 mins Position:  Location:both hands       []  Vasopneumatic Device Pressure:       [] lo [] med [] hi   Temperature: [] lo [] med [] hi        [x] Skin assessment post-treatment:  [x]intact []redness- no adverse reaction    []redness - adverse reaction:       25 min Therapeutic Exercise:  [] See flow sheet :   Rationale: increase ROM and increase strength to improve the patients ability to , grasp     Recheck b , pinches ROM    HEP review         With   [] TE   [] TA   [] neuro   [] other: Patient Education: [x] Review HEP    [] Progressed/Changed HEP based on:   [] positioning   [] body mechanics   [] transfers   [] heat/ice application   [] Splint wear/care   [] Sensory re-education   [] scar management      [] other:            Other Objective/Functional Measures:      Measurements: Taken with Carlo Dynamometer, in Lbs   Level 2 6/3/19  Eval 7/1 7/31 Change    Right 35 43 42 +7    Left 40 60 64 +24           Pinch Measurements: Taken with Pinch Gauge, in Lbs   (hand) 6/3/19 7/1 7/31 Change   3 pt       Right 7 14 15 +8   Left  8 10 12 +                   Lateral       Right 12 19 21    Left 11 11 16                    Tip       Right 6 11 11    Left 7 10 10                                Thumb Rad Abd   Right   35 (30)        (15)              Left      58 (50)  (45)       Thumb palmar abduction   Right   45 (40)        (42)   Left      58 (55)        (55)     FOTO  63        (53)       Pain Level (0-10 scale) post treatment: Right 2/10, Left 1/10    ASSESSMENT/Changes in Function: Patient progressing nicely with Left hand as shown by increase in ROM/Strength, Right hand progressing but at slower rate. Patient will continue to benefit from skilled OT services to modify and progress therapeutic interventions, address ROM deficits, address strength deficits and instruct in home and community integration to attain remaining goals. []  See Plan of Care  []  See progress note/recertification  []  See Discharge Summary         Progress towards goals / Updated goals:  2.  Patient will be independent in home program to increase thumb abduciton in right and both digit flexion.  Met with HEP 7/9/19  3.  Patient will be introduced to joint protection strategies and adaptive equipment to improve self and home care and reduce pain. Met 7/29/19  4.  Patient will be independent in home program for hand strengthening Met 7/29/19     LTGs   1.  Patient will report pain diminished to 0-2/10 with routine use  met today 7/31/19  2.  Patient will increase  strength in both hand by 10 pounds to increase ease of opening jars Met for Left hand, + 7 for Right hand 7/31/19  3.  Patient will incorporate adaptive equipment and strategies to improve independence in self care tasks such as buttoning, cutting, writing etc. Met 7/29/19       PLAN  []  Upgrade activities as tolerated     [x]  Continue plan of care  []  Update interventions per flow sheet       []  Discharge due to:_  []  Other:_      ANDREA Saenz 7/31/2019  8:45 AM    Future Appointments   Date Time Provider Dex [place]   7/31/2019 11:00 AM Burnadette Broccoli [id number] [facility]   7/31/2019 12:30 PM Yari Stiles, PT [id number] [facility]   8/5/2019  3:15 PM Burnadette Broccoli [id number] [facility]   8/5/2019  4:00 PM Kiki Backer, PTA MMCPTPB [facility]   8/7/2019 11:45 AM Burnadette Broccoli FIRAGBC [facility]   8/7/2019 12:30 PM Kiki Backer, PTA MMCPTPB [facility]   8/12/2019  4:00 PM Kiki Backer, PTA MMCPTPB [facility]   8/12/2019  4:45 PM TEODORO Escalante/L MMCPTPB [facility]   8/14/2019  3:30 PM Kiki Backer, PTA MMCPTPB [facility]   8/14/2019  4:00 PM Burnadette Broccoli QHFBPFL [facility]   9/23/2019  3:45 PM Tejas Farmer MD [facility]   10/2/2019  1:00 PM Given, Karissa Walden MD [facility]   10/25/2019  8:30 AM [facility]   11/4/2019  3:30 PM Carin Baker  [address]

## 2019-07-31 NOTE — PROGRESS NOTES
PT DAILY TREATMENT NOTE 10-18    Patient Name: Sarahi Frame  Date:2019  : 1955  [x]  Patient  Verified  Payor: VA MEDICARE / Plan: VA MEDICARE PART A & B / Product Type: Medicare /    In time:1230 Out time:103  Total Treatment Time (min): 33  Visit #: 14 of 16    Medicare/BCBS Only   Total Timed Codes (min):  33 1:1 Treatment Time:  33       Treatment Area: Presence of left artificial knee joint [Z96.652]  Iliotibial band syndrome, left leg [M76.32]    SUBJECTIVE  Pain Level (0-10 scale): 3/10  Any medication changes, allergies to medications, adverse drug reactions, diagnosis change, or new procedure performed?: [x] No    [] Yes (see summary sheet for update)  Subjective functional status/changes:   [] No changes reported  Pt reports he thinks the kinesio tape helped. He is now feeling pain in right knee, was suppose to get that knee replaced before the left. He felt pretty good yesterday overall, but is hurting more today in knees, neck, hands, likely due to the weather. OBJECTIVE       33 min Therapeutic Exercise:  [x] See flow sheet :   Rationale: increase ROM, increase strength, improve coordination and improve balance to improve the patients ability to increase ease of ambulation. With   [] TE   [] TA   [] neuro   [] other: Patient Education: [x] Review HEP    [] Progressed/Changed HEP based on:   [] positioning   [] body mechanics   [] transfers   [] heat/ice application    [] other:      Other Objective/Functional Measures:    Pes KT intact, space corrector for patellar tendon fell off  Slight pain at patellar tendon with SAQ, held at todays session  Added 8\" eccentric reverse step down  Continues to be challenged with 4\" eccentric forward step down   Reduced pain with TB pes as compared to previous session               Pain Level (0-10 scale) post treatment: 1/10    ASSESSMENT/Changes in Function: Patient is slowly progressing towards goals.  He reports reduction of pes anserinus pain with KT, but continues to have pain at patellar tendon. Focused on closed kinetic chain quadriceps strengthening exercises to avoid flare up of patellar tendon pain. Will continue to address quadriceps/VMO strength to reduce buckling in order to increase ease/safety of stair negotiation. Patient will continue to benefit from skilled PT services to modify and progress therapeutic interventions, address functional mobility deficits, address ROM deficits, address strength deficits, analyze and address soft tissue restrictions and analyze and cue movement patterns to attain remaining goals. [x]  See Plan of Care  []  See progress note/recertification  []  See Discharge Summary         Progress towards goals / Updated goals:  1. Pt will be independent with HEP to maintain progression.  MET (6/17/19)     Long term goals: To be accomplished within 8 weeks  1. Pt will improve FOTO score by 10 points to 57/100 to show improvement with functional mobility performance. 2. Pt will have Left knee AROM improved to 0-115 degs at least to amb household and community with normal and safe pattern. Not met: 106 degrees (6/21/19)  3. Pt will have B hip abd, ext strength improved to at least 4/5 to be able to amb longer distance and navigate stair safely. Slowly progressing. 7/17/19  4.  Pt will report no more than 1-2/10 pain at worst so he can amb and perform ADLs with ease.      PLAN  []  Upgrade activities as tolerated     [x]  Continue plan of care  []  Update interventions per flow sheet       []  Discharge due to:_  []  Other:_      Kati Hong, PT 7/31/2019  8:01 AM    Future Appointments   Date Time Provider Dex [place]   7/31/2019 11:00 AM Quiana Stuart TZSDODLAUREN [facility]   7/31/2019 12:30 PM Francesco Garsia, PT PTRYIYL [facility]   8/5/2019  3:15 PM Quiana EDDY [facility]   8/5/2019  4:00 PM Kamlesh Cuevas, PTA MMCPTPB [facility]   8/7/2019 11:45 AM Quiana Stuart PCDFXRE [facility]   8/7/2019 12:30 PM France Wilton, PTA MMCPTPB [address]   8/12/2019  4:00 PM France Pérezet, PTA MMCPTPB [address]   8/12/2019  4:45 PM Everton Santacruz, OTR/L [facility] [address]   8/14/2019  3:30 PM France Wilton, PTA MMCPTPB [address]   8/14/2019  4:00 PM Indigo Daniela AGYVDZU [address]   9/23/2019  3:45 PM Davian Sanchez MD [facility]   10/2/2019  1:00 PM Tiffanie Chinchilla MD [address]   10/25/2019  8:30 AM Najma Hernandez PA-C [facility] SCH[facility]   11/4/2019  3:30 PM Judson Hernandez  [address]

## 2019-07-31 NOTE — PROGRESS NOTES
[facility] [employer]  [address] [facility]  [phone] fax    Continued Plan of Care/ Re-certification for Occupational Therapy Services    Patient name: Charlie Snyder Start of Care: 6/3/19   Referral source: Obi Lin MD : 1955   Medical/Treatment Diagnosis: Pain in right hand [M79.641]  Pain in left hand [M79.642]  Payor: VA MEDICARE / Plan: VA MEDICARE PART A & B / Product Type: Medicare /  Onset Date:6 months     Prior Hospitalization: see medical history Provider#: [id number]   Medications: Verified on Patient Summary List    Comorbidities: Right shoulder pain, TKR, back surgery, HTN, eye surgery, prostate CA  Prior Level of Function:Parts delivery, I self care home care driving, camping  Visits from Start of Care: 15    Missed Visits: 0    The Plan of Care and following information is based on the patient's current status:     Measurements: Taken with Carlo Dynamometer, in Lbs   Level 2 6/3/19  Eval  Change     Right 35 43 42 +7     Left 40 60 64 +24           Pinch Measurements: Taken with Pinch Gauge, in Lbs   (hand) 6/3/19 7/1 7/31 Change   3 pt           Right 7 14 15 +8   Left  8 10 12 +                           Lateral           Right 12 19 21 +9    Left 11 11 16 +5                            Tip           Right 6 11 11 +5    Left 7 10 10 +3                                   Current measures are listed below with prior in ( )  Thumb Rad Abd              Right   35        (30)        (15)              Left      58       (50)       (45)        Thumb palmar abduction              Right   45        (40)        (42)              Left      58       (55)        (55)     FOTO  63        (53)                   Pain Level (0-10 scale) post treatment: Right 2/10, Left 1/10    Goal:2.  Patient will be independent in home program to increase thumb abduciton in right and both digit flexion  Status at last note/certification:Did not have  Current Status: met    Caryl Phillips will be introduced to joint protection strategies and adaptive equipment to improve self and home care and reduce pain  Status at last note/certification:unaware  Current Status: met    Caryl Phillips will be independent in home program for hand strengthening   Status at last note/certification:  Current Status: met    LTG  Goal:1.  Patient will report pain diminished to 0-2/10 with routine use    Status at last note/certification:Pain 6/65  Current Status: not met Progressing, generally 2-3/10    Goal:2.  Patient will increase  strength in both hand by 10 pounds to increase ease of opening jars   Status at last note/certification:See chart  Current Status: not met Progressing, met for left, right increased 7#    Caryl Phillips will incorporate adaptive equipment and strategies to improve independence in self care tasks such as buttoning, cutting, writing etc  Status at last note/certification:Unaware  Current Status: met    Key functional changes: Improved , function per FOTO. Now incorporating adaptive equipment well. Left hand much improved, right still with reduced       Problems/ barriers to goal attainment: Old injury ot right thumb     Treatment Plan: Therapeutic exercise, Therapeutic activities, Physical agent/modality, Manual therapy, Patient education and ADLs/IADLs      Patient Goal (s) has been updated and includes: less pain, better strength     Goals for this certification period to be accomplished in 4  weeks:  1. Finalize home program for strengthening of right hand  2. Patient will report right hand pain 0-2/10 with routine use for home care tasks  3. Patient will increase right hand  at least 5 additional pounds for holding parts at work.   .    Frequency / Duration: Patient to be seen 2 times per week for 4 weeks:    Assessment / Recommendations:Patient will benefit from continued skilled occupational therapy to increase upper extremity function and functional independence. Certification Period: 8/1/19-8/30/19    Ben Evans OTR/L 7/31/2019 2:57 PM    ________________________________________________________________________  I certify that the above Therapy Services are being furnished while the patient is under my care. I agree with the treatment plan and certify that this therapy is necessary.       Physician's Signature:____________Date:_________TIME:________    ** Signature, Date and Time must be completed for valid certification **      Please sign and return to [facility] [employer] [employer]  [address] [place] [address]            [phone] fax

## 2019-08-02 RX ORDER — WARFARIN 3 MG/1
TABLET ORAL
Qty: 30 TAB | Refills: 0 | Status: SHIPPED | OUTPATIENT
Start: 2019-08-02 | End: 2019-12-12 | Stop reason: SDUPTHER

## 2019-08-05 ENCOUNTER — HOSPITAL ENCOUNTER (OUTPATIENT)
Dept: PHYSICAL THERAPY | Age: 64
Discharge: HOME | End: 2019-08-05
Payer: MEDICARE

## 2019-08-05 ENCOUNTER — OFFICE VISIT (OUTPATIENT)
Dept: ORTHOPEDIC SURGERY | Facility: CLINIC | Age: 64
End: 2019-08-05

## 2019-08-05 VITALS
TEMPERATURE: 95.9 F | BODY MASS INDEX: 32.93 KG/M2 | WEIGHT: 230 LBS | DIASTOLIC BLOOD PRESSURE: 84 MMHG | RESPIRATION RATE: 16 BRPM | OXYGEN SATURATION: 96 % | HEIGHT: 70 IN | HEART RATE: 81 BPM | SYSTOLIC BLOOD PRESSURE: 142 MMHG

## 2019-08-05 DIAGNOSIS — M17.11 PRIMARY OSTEOARTHRITIS OF RIGHT KNEE: ICD-10-CM

## 2019-08-05 DIAGNOSIS — M17.11 ARTHRITIS OF RIGHT KNEE: Primary | ICD-10-CM

## 2019-08-05 PROCEDURE — 97110 THERAPEUTIC EXERCISES: CPT

## 2019-08-05 PROCEDURE — 97018 PARAFFIN BATH THERAPY: CPT

## 2019-08-05 RX ORDER — BETAMETHASONE SODIUM PHOSPHATE AND BETAMETHASONE ACETATE 3; 3 MG/ML; MG/ML
6 INJECTION, SUSPENSION INTRA-ARTICULAR; INTRALESIONAL; INTRAMUSCULAR; SOFT TISSUE ONCE
Qty: 1 ML | Refills: 0
Start: 2019-08-05 | End: 2019-08-05

## 2019-08-05 NOTE — PROGRESS NOTES
OT DAILY TREATMENT NOTE 10-18    Patient Name: Maria Esther Kendall  Date:2019  : 1955  [x]  Patient  Verified  Payor: VA MEDICARE / Plan: VA MEDICARE PART A & B / Product Type: Medicare /    In time:318  Out time:400  Total Treatment Time (min): 42  Visit #: 1 of 8    Medicare/BCBS Only   Total Timed Codes (min):  32 1:1 Treatment Time:  42     Treatment Area: Pain in right hand [M79.641]  Pain in left hand [M79.642]    SUBJECTIVE  Pain Level (0-10 scale): B Hands  2/10  Any medication changes, allergies to medications, adverse drug reactions, diagnosis change, or new procedure performed?: [x] No    [] Yes (see summary sheet for update)  Subjective functional status/changes:   [] No changes reported  I am going back to work next Tuesday ()  Patient reports having headache due to recent increase in BP.      OBJECTIVE    Modality rationale: decrease pain and increase tissue extensibility to improve the patients ability to grasp   Min Type Additional Details       [] Estim:  []Unatt       []IFC  []Premod                        []Other:  []w/ice   []w/heat  Position:  Location:       [] Estim: []Att    []TENS instruct  []NMES                    []Other:  []w/US   []w/ice   []w/heat  Position:  Location:       []  Traction: [] Cervical       []Lumbar                       [] Prone          []Supine                       []Intermittent   []Continuous Lbs:  [] before manual  [] after manual       []  Ultrasound: []Continuous   [] Pulsed                           []1MHz   []3MHz W/cm2:  Location:       []  Iontophoresis with dexamethasone         Location: [] Take home patch   [] In clinic       []  Ice     []  heat  []  Ice massage  []  Laser   []  Anodyne Position:  Location:      10 []  Laser with stim  [x]  Other: Paraffin 10 mins Position:  Location:both hands       []  Vasopneumatic Device Pressure:       [] lo [] med [] hi   Temperature: [] lo [] med [] hi        [x] Skin assessment post-treatment:  [x]intact []redness- no adverse reaction  []redness - adverse reaction:     32 min Therapeutic Exercise:  [] See flow sheet :   Rationale: increase ROM and increase strength to improve the patients ability to     Estuardo Latch: 3 ways 15x each   Blue Web: Right Hand: Grasp, Lumbrical, [place] 15x with hold   Medium Theraputty: Right Hand  10/10    With    [] TE   [] TA   [] neuro   [] other: Patient Education: [x] Review HEP    [] Progressed/Changed HEP based on:   [] positioning   [] body mechanics   [] transfers   [] heat/ice application   [] Splint wear/care   [] Sensory re-education   [] scar management      [] other:            Other Objective/Functional Measures:     No c/o pain with addition of Blue Web     Pain Level (0-10 scale) post treatment: Right 1/10, Left 2/10    ASSESSMENT/Changes in Function: Strength Improving, slight decrease in headache at end of session reported     Patient will continue to benefit from skilled OT services to modify and progress therapeutic interventions, address ROM deficits, address strength deficits and instruct in home and community integration to attain remaining goals. []  See Plan of Care  []  See progress note/recertification  []  See Discharge Summary         Goals for this certification period to be accomplished in 4  weeks:  1. Finalize home program for strengthening of right hand  2. Patient will report right hand pain 0-2/10 with routine use for home care tasks Met for today, check for consistency 8/5/19  3. Patient will increase right hand  at least 5 additional pounds for holding parts at work.   .Progressing with in clinic activities 8/5/19    PLAN  []  Upgrade activities as tolerated     [x]  Continue plan of care  []  Update interventions per flow sheet       []  Discharge due to:_  []  Other:_      ANDREA Brock 8/5/2019  12:18 PM     Future Appointments   Date Time Provider Dex [place]   8/5/2019  3:15 PM Rachelle Ruffin [id number] [facility]   8/5/2019  4:00 PM Zi Birmingham, PTA MMCPTPB [facility]   8/7/2019 11:45 AM Mp Dover MMCPTPB [facility]   8/7/2019 12:30 PM Zi Birmingham, PTA MMCPTPB [facility]   8/12/2019  4:00 PM Zi Birmingham, PTA MMCPTPB [facility]   8/12/2019  4:45 PM Jose Lentz, OTR/L MMCPTP[facility]   8/14/2019  3:30 PM Terrace Speed MMCPTPB [facility]   8/14/2019  4:00 PM Mp Dover JHRJWNI [facility]   9/23/2019  3:45 PM Tay Lamas MD [facility]ED   10/2/2019  1:00 PM Roseline Chinchilla MD [facility] [facility]   10/25/2019  8:30 AM Octavio Brown PA-C [facility] SCHED   11/4/2019  3:30 PM Tyler Austin  [address]

## 2019-08-05 NOTE — PROGRESS NOTES
Janet Colon  1955   Chief Complaint   Patient presents with    Knee Pain     right knee pain        HISTORY OF PRESENT ILLNESS  Janet Colon is a 75 y.o. male who presents today for reevaluation of right knee pain. He was working in PT last week on his left knee and some of the exercises have aggravated his right knee. The pain started last Thursday, it was sharp and located on the lateral aspect on his knee. No injuries or falls that brought this pain on, he has moderate arthritis of the right knee. He started applying lidocaine patches to the area and it has helped his pain some. He also rested over the weekend. He is requesting a cortisone injection for the right knee today. He has received lasting relief in the past from this. His right shoulder is doing great post operatively. He has finished PT and has transitioned to an HEP. Patient denies any fever, chills, chest pain, shortness of breath or calf pain. There are no new illness or injuries to report since last seen in the office. No changes in medications, allergies, social or family history. PHYSICAL EXAM:   Visit Vitals  /84 (BP 1 Location: Left arm, BP Patient Position: Sitting)   Pulse 81   Temp 95.9 °F (35.5 °C) (Oral)   Resp 16   Ht 5' 10\" (1.778 m)   Wt 230 lb (104.3 kg)   SpO2 96%   BMI 33.00 kg/m²     The patient is a well-developed, well-nourished male   in no acute distress. The patient is alert and oriented times three. Mood and affect are normal.  LYMPHATIC: lymph nodes are not enlarged and are within normal limits  SKIN: normal in color and non tender to palpation. There are no bruises or abrasions noted. NEUROLOGICAL: Motor sensory exam is within normal limits. Reflexes are equal bilaterally.  There is normal sensation to pinprick and light touch  MUSCULOSKELETAL:  Examination Right knee   Skin Intact   Range of motion 0-130   Effusion -   Medial joint line tenderness -   Lateral joint line tenderness + Tenderness Pes Bursa -   Tenderness insertion MCL -   Tenderness insertion LCL -   Laiths -   Patella crepitus +   Patella grind -   Lachman -   Pivot shift -   Anterior drawer -   Posterior drawer -   Varus stress -   Valgus stress -   Neurovascular Intact   Calf Swelling and Tenderness to Palpation -   Giorgio's Test -   Hamstring Cord Tightness -      ORTHOPEDIC EXAM of right shoulder:  Inspection: swelling not present,  Bruising not present  Incision well healed  Passive glenohumeral abduction 0-90 degrees, 180 FF, 50 ER, 170 AFF  Stability: Stable  Strength: 5/5  2+ distal pulses  Gentle rotator cuff testing do not reveal weakness or pain      PROCEDURE: After sterile prep, 3 cc of Xylocaine and 1 cc of Celestone were injected into the right knee. [facility]  OFFICE PROCEDURE PROGRESS NOTE        Chart reviewed for the following:  Yolanda KO PA, have reviewed the History, Physical and updated the Allergic reactions for [address] [place] [address] performed immediately prior to start of procedure:  Yolanda KO PA-C, have performed the following reviews on [facility] prior to the start of the procedure:            * Patient was identified by name and date of birth   * Agreement on procedure being performed was verified  * Risks and Benefits explained to the patient  * Procedure site verified and marked as necessary  * Patient was positioned for comfort  * Consent was signed and verified     Time: 9:45 AM    Date of procedure: 8/5/2019    Procedure performed by:  CARA Deins    Provider assisted by: (see medication administration)    How tolerated by patient: tolerated the procedure well with no complications    Comments: none      IMPRESSION:      ICD-10-CM ICD-9-CM    1.  Arthritis of right knee M17.11 716.96 betamethasone (CELESTONE SOLUSPAN) 6 mg/mL injection      BETAMETHASONE ACETATE & SODIUM PHOSPHATE INJECTION 3 MG EA. DRAIN/INJECT LARGE JOINT/BURSA   2. Primary osteoarthritis of right knee M17.11 715.16         PLAN:   Pt having right knee pain  I think his pain is coming from arthritis. Pt given a right knee cortisone injection. He tolerated it well. He will also continue to use the lidocaine patches as needed for pain. I have also recommended he rest for a couple more days. He has finished PT for his right shoulder and transitioned to an HEP.   He also needed a note to go back to work full duty no restrictions for the left knee and right shoulder as of 8/13/19  RTC PRN for the right knee        [facility]

## 2019-08-05 NOTE — PROGRESS NOTES
PT DAILY TREATMENT NOTE 10-18    Patient Name: Gregory Aldridge  Date:2019  : 1955  [x]  Patient  Verified  Payor: VA MEDICARE / Plan: VA MEDICARE PART A & B / Product Type: Medicare /    In time:4:04 Out time:4:45  Total Treatment Time (min):41  Visit #: 7 of     Medicare/BCBS Only   Total Timed Codes (min):  41 1:1 Treatment Time:  30       Treatment Area: Presence of left artificial knee joint [Z96.652]  Iliotibial band syndrome, left leg [M76.32]    SUBJECTIVE  Pain Level (0-10 scale): 3/10  Any medication changes, allergies to medications, adverse drug reactions, diagnosis change, or new procedure performed?: [x] No    [] Yes (see summary sheet for update)  Subjective functional status/changes:   [] No changes reported  Pt reports he got a cortisone injection in his right knee. He states less pain in his left knee recently and less buckling. He has been a little concerned about his BP being up and has been having some headaches. OBJECTIVE       41 min Therapeutic Exercise:  [x] See flow sheet :   Rationale: increase ROM, increase strength, improve coordination and improve balance to improve the patients ability to increase ease of ambulation. With   [] TE   [] TA   [] neuro   [] other: Patient Education: [x] Review HEP    [] Progressed/Changed HEP based on:   [] positioning   [] body mechanics   [] transfers   [] heat/ice application    [] other:      Other Objective/Functional Measures:   BP before exercise: 144/93  BP after exercise: 144/88   Educated to follow up with MD regarding BP  Educated no heat or exercise to right knee  Given PTB for working on Sigala-Emery and reverse TKE at home  Improving ease of sit to stands with less pain    Pain Level (0-10 scale) post treatment: 1/10    ASSESSMENT/Changes in Function: Pt is progressing well towards goals with reducing knee pain and buckling.  He continues to lack TKE strength and locks knee in extension for stability at times causing increased tension on the ITB. Will continue working on eccentric quad strength and medial knee stability for reduced pain with sit to stand transfers and walking. Progress towards goals / Updated goals:  1. Pt will be independent with HEP to maintain progression.  MET (6/17/19)     Long term goals: To be accomplished within 8 weeks  1. Pt will improve FOTO score by 10 points to 57/100 to show improvement with functional mobility performance. 2. Pt will have Left knee AROM improved to 0-115 degs at least to amb household and community with normal and safe pattern. Not met: 106 degrees (6/21/19)  3. Pt will have B hip abd, ext strength improved to at least 4/5 to be able to amb longer distance and navigate stair safely. Slowly progressing. 7/17/19  4.  Pt will report no more than 1-2/10 pain at worst so he can amb and perform ADLs with ease.      PLAN  [x]  Upgrade activities as tolerated     [x]  Continue plan of care  []  Update interventions per flow sheet       []  Discharge due to:_  []  Other:_      Lashay Craft PTA 8/5/2019  8:01 AM    Future Appointments   Date Time Provider Dex Hatfield   8/7/2019 11:45 AM Anya Lee RRYTFBL [facility]   8/7/2019 12:30 PM Keith Maxim, PTA MMCPTPB [facility]   8/12/2019  4:00 PM Keith Maxim PTA [facility]   8/12/2019  4:45 PM Ramon Dotson OTR/L [facility]   8/14/2019  3:30 PM Antoinette Stuart PTA [facility]   8/14/2019  4:00 PM Anya Lee [id number] [facility]   9/23/2019  3:45 PM Alina Mathias MD [facility] SCHED   10/2/2019  1:00 PM Kimber Chinchilla MD [address]   10/25/2019  8:30 AM Zoe German PA-C [facility] SCHED   11/4/2019  3:30 PM Antonio Gudino  [address]

## 2019-08-05 NOTE — PROGRESS NOTES
1. Have you been to the ER, urgent care clinic since your last visit? Hospitalized since your last visit? No    2. Have you seen or consulted any other health care providers outside of the [facility] since your last visit? Include any pap smears or colon screening.  NO

## 2019-08-05 NOTE — LETTER
NOTIFICATION RETURN TO WORK / SCHOOL 
 
8/5/2019 9:51 AM 
 
Mr. Dennis Sawyer [address] [facility] [address] To Whom It May Concern: 
 
Dennis Sawyer is currently under the care of [address] [place] [address]. He is released to work full duty, no restrictions for his right shoulder and left knee as of 8/13/2019 If there are questions or concerns please have the patient contact our office.  
 
 
 
Sincerely, 
 
 
CARA Flowers

## 2019-08-06 ENCOUNTER — OFFICE VISIT (OUTPATIENT)
Dept: FAMILY MEDICINE CLINIC | Facility: CLINIC | Age: 64
End: 2019-08-06

## 2019-08-06 VITALS
HEART RATE: 83 BPM | TEMPERATURE: 98.8 F | WEIGHT: 233 LBS | DIASTOLIC BLOOD PRESSURE: 78 MMHG | OXYGEN SATURATION: 96 % | HEIGHT: 70 IN | BODY MASS INDEX: 33.36 KG/M2 | SYSTOLIC BLOOD PRESSURE: 140 MMHG

## 2019-08-06 DIAGNOSIS — R51.9 GENERALIZED HEADACHES: Primary | ICD-10-CM

## 2019-08-06 DIAGNOSIS — Z86.718 PERSONAL HISTORY OF VENOUS THROMBOSIS AND EMBOLISM: ICD-10-CM

## 2019-08-06 DIAGNOSIS — Z98.890 STATUS POST SHOULDER SURGERY: ICD-10-CM

## 2019-08-06 LAB
INR BLD: 1.6
PT POC: NORMAL
VALID INTERNAL CONTROL?: YES

## 2019-08-06 RX ORDER — DEXAMETHASONE 0.5 MG/1
TABLET ORAL
Qty: 14 TAB | Refills: 0 | Status: SHIPPED | OUTPATIENT
Start: 2019-08-06 | End: 2019-10-02

## 2019-08-06 RX ORDER — VERAPAMIL HYDROCHLORIDE 120 MG/1
TABLET, FILM COATED ORAL
Qty: 60 TAB | Refills: 2 | Status: SHIPPED | OUTPATIENT
Start: 2019-08-06 | End: 2019-10-02

## 2019-08-06 NOTE — PATIENT INSTRUCTIONS
Mr. Luis Pete is here today for anticoagulation monitoring for the diagnosis of DVT. His INR goal is 2.0-3.0 and his current Coumadin dose 10 mg x 6 days, 8 mg x 1 day (Fri). Today's findings include an INR of 1.6 (normal INR range 0.8-1.2) . Considering Mr. Winters's past history, todays findings, and per the coumadin policy/protocol, Mr. Chel Salcedo was instructed to take Coumadin as follows,  10 mg daily. He was also instructed to schedule an appointment in 2 1/2 weeks prior to leaving for an INR check. A full discussion of the nature of anticoagulants has been carried out. A full discussion of the need for frequent and regular monitoring, precise dosage adjustment and compliance was stressed. Side effects of potential bleeding were discussed and Mr. Chel Salcedo was instructed to call [phone] if there are any signs of abnormal bleeding. Mr. Chel Salcedo was instructed to avoid any OTC items containing aspirin or ibuprofen and prior to starting any new OTC products to consult with his physician or pharmacist to ensure no drug interactions are present. Mr. Chel Salcedo was instructed to avoid any major changes in his general diet and to avoid alcohol consumption. .    Mr. Chel Salcedo verbalized his understanding of all instructions and will call the office with any questions, concerns, or signs of abnormal bleeding or blood clot.

## 2019-08-06 NOTE — PROGRESS NOTES
Alka Medina presents today for   Chief Complaint   Patient presents with    Hypertension       Alka Medina preferred language for health care discussion is english. Is someone accompanying this pt? no    Is the patient using any DME equipment during [address]? no    Depression Screening:  3 most recent PHQ Screens 8/5/2019   PHQ Not Done -   Little interest or pleasure in doing things Not at all   Feeling down, depressed, irritable, or hopeless Not at all   Total Score PHQ 2 0       Learning Assessment:  Learning Assessment 2/8/2019   PRIMARY LEARNER Patient   HIGHEST LEVEL OF EDUCATION - PRIMARY LEARNER  -   BARRIERS PRIMARY LEARNER -   [facility]    NEED -   LEARNER PREFERENCE PRIMARY DEMONSTRATION   ANSWERED BY Patient   RELATIONSHIP SELF       Abuse Screening:  Abuse Screening Questionnaire 11/6/2018   Do you ever feel afraid of your partner? N   Are you in a relationship with someone who physically or mentally threatens you? N   Is it safe for you to go home? Y       Fall Risk  Fall Risk Assessment, last 12 mths 8/5/2019   Able to walk? Yes   Fall in past 12 months? Yes   Fall with injury? Yes   Number of falls in past 12 months 1   Fall Risk Score 2       Health Maintenance reviewed and discussed and ordered per Provider. Health Maintenance Due   Topic Date Due    Hepatitis C Screening  1955    Shingrix Vaccine Age 50> (1 of 2) 04/13/2003    GLAUCOMA SCREENING Q2Y  04/13/2018    Pneumococcal 65+ years (1 of 2 - PCV13) 04/13/2018    Influenza Age 5 to Adult  08/01/2019   . Alka Medina is updated on all     Pt currently taking Antiplatelet therapy? Yes warfarin    Coordination of Care:  1. Have you been to the ER, urgent care clinic since your last visit? no Hospitalized since your last visit? no    2.  Have you seen or consulted any other health care providers outside of the [address] [place] [address] since your last visit? no Include any pap smears or colon screening.  no

## 2019-08-07 ENCOUNTER — HOSPITAL ENCOUNTER (OUTPATIENT)
Dept: PHYSICAL THERAPY | Age: 64
Discharge: HOME | End: 2019-08-07
Payer: MEDICARE

## 2019-08-07 ENCOUNTER — APPOINTMENT (OUTPATIENT)
Dept: PHYSICAL THERAPY | Age: 64
End: 2019-08-07
Payer: MEDICARE

## 2019-08-07 PROCEDURE — 97110 THERAPEUTIC EXERCISES: CPT

## 2019-08-07 PROCEDURE — 97018 PARAFFIN BATH THERAPY: CPT

## 2019-08-07 NOTE — PROGRESS NOTES
PT DAILY TREATMENT NOTE 10-18    Patient Name: Crow Pereyra  Date:2019  : 1955  [x]  Patient  Verified  Payor: VA MEDICARE / Plan: VA MEDICARE PART A & B / Product Type: Medicare /    In time:12:31 Out time:1:07  Total Treatment Time (min):36  Visit #: 8 of     Medicare/BCBS Only   Total Timed Codes (min):  36 1:1 Treatment Time:  29       Treatment Area: Presence of left artificial knee joint [Z96.652]  Iliotibial band syndrome, left leg [M76.32]    SUBJECTIVE  Pain Level (0-10 scale): 2-3/10  Any medication changes, allergies to medications, adverse drug reactions, diagnosis change, or new procedure performed?: [] No    [x] Yes (see summary sheet for update)  Subjective functional status/changes:   [] No changes reported  Pt reports he started a new BP medication and stopped an old one. He was also given a 5 day steroid pack to help the neck pain and headaches which already seems to be working. He goes back to work Tuesday. He would like to try one time a week for a few visits to ensure he doesn't have return of pain. OBJECTIVE       36 min Therapeutic Exercise:  [x] See flow sheet :   Rationale: increase ROM, increase strength, improve coordination and improve balance to improve the patients ability to increase ease of ambulation. With   [] TE   [] TA   [] neuro   [] other: Patient Education: [x] Review HEP    [] Progressed/Changed HEP based on:   [] positioning   [] body mechanics   [] transfers   [] heat/ice application    [] other:      Other Objective/Functional Measures:   No increased pain during session  Able to stand after sitting without difficulty  Improving eccentric quad control  Challenged with SLS foam to prevent locking knee in extension    Pain Level (0-10 scale) post treatment: 2/10    ASSESSMENT/Changes in Function: Pt is progressing well towards goals with reducing knee pain and buckling.  He continues to lock knee in extension as compensation when working Problem: Patient Care Overview  Goal: Plan of Care Review  Outcome: Ongoing (interventions implemented as appropriate)  POC reviewed with parent. All questions answered and emotional support provided. PTs HR varied from 60s to 203. Depending on pts behavorial state. Pt had two episodes today. Around 6pm.The second where he was in a catatonic state. Sustained HR in 200s for several minutes before given ativan. EKG changes. Over all plan is to have pt do swallow study zakiya if lucid and dad present. If he fails dad may give his consent for g-tube. MDs starting to try and find placement for pt. Will continue to monitor. Please see doc flow sheet for full assessment.      on SLS exercises. He will benefit from a few more visits to ensure successful return to work without reoccurrence of pain. Progress towards goals / Updated goals:  1. Pt will be independent with HEP to maintain progression.  MET (6/17/19)     Long term goals: To be accomplished within 8 weeks  1. Pt will improve FOTO score by 10 points to 57/100 to show improvement with functional mobility performance. 2. Pt will have Left knee AROM improved to 0-115 degs at least to amb household and community with normal and safe pattern. Not met: 106 degrees (6/21/19)  3. Pt will have B hip abd, ext strength improved to at least 4/5 to be able to amb longer distance and navigate stair safely. Slowly progressing. 7/17/19  4.  Pt will report no more than 1-2/10 pain at worst so he can amb and perform ADLs with ease.      PLAN  [x]  Upgrade activities as tolerated     [x]  Continue plan of care  []  Update interventions per flow sheet       []  Discharge due to:_  []  Other:_      Washington Schmitt PTA 8/7/2019  8:01 AM    Future Appointments   Date Time Provider Dex Hatfield   8/12/2019  4:00 PM Rachel Rzaa MMCPTPB [facility]   8/12/2019  4:45 PM Tram Brooke OTR/L [facility]   8/20/2019  4:30 PM Boston Nino PTA [facility]   8/20/2019  5:00 PM Americo Civil [id number] [facility]   9/23/2019  3:45 PM MD CARLY Li-[facility] SCHED   10/2/2019  1:00 PM Jake Chinchilla MD [address]   10/25/2019  8:30 AM Dex Flores PA-C [facility] SCHED   11/4/2019  3:30 PM Francisco Javier Colin  [address]

## 2019-08-07 NOTE — PROGRESS NOTES
OT DAILY TREATMENT NOTE 10-18    Patient Name: Maria Tierney  Date:2019  : 1955  [x]  Patient  Verified  Payor: VA MEDICARE / Plan: VA MEDICARE PART A & B / Product Type: Medicare /    In EQLS:2317  Out time:1229  Total Treatment Time (min): 44  Visit #: 2 of 8    Medicare/BCBS Only   Total Timed Codes (min):  34 1:1 Treatment Time:  44     Treatment Area: Pain in right hand [M79.641]  Pain in left hand [M79.642]    SUBJECTIVE  Pain Level (0-10 scale): Right Hand 2/10, Left Hand 1/10  Any medication changes, allergies to medications, adverse drug reactions, diagnosis change, or new procedure performed?: [x] No    [] Yes (see summary sheet for update)  Subjective functional status/changes:   [] No changes reported  The Left is almost back to normal. I am getting back to doing normal stuff with it.     OBJECTIVE            Modality rationale: decrease pain and increase tissue extensibility to improve the patients ability to grasp   Min Type Additional Details       [] Estim:  []Unatt       []IFC  []Premod                        []Other:  []w/ice   []w/heat  Position:  Location:       [] Estim: []Att    []TENS instruct  []NMES                    []Other:  []w/US   []w/ice   []w/heat  Position:  Location:       []  Traction: [] Cervical       []Lumbar                       [] Prone          []Supine                       []Intermittent   []Continuous Lbs:  [] before manual  [] after manual       []  Ultrasound: []Continuous   [] Pulsed                           []1MHz   []3MHz W/cm2:  Location:       []  Iontophoresis with dexamethasone         Location: [] Take home patch   [] In clinic       []  Ice     []  heat  []  Ice massage  []  Laser   []  Anodyne Position:  Location:      10 []  Laser with stim  [x]  Other: Paraffin 10 mins Position:  Location:both hands       []  Vasopneumatic Device Pressure:       [] lo [] med [] hi   Temperature: [] lo [] med [] hi        [x] Skin assessment post-treatment:  [x]intact []redness- no adverse reaction  []redness - adverse reaction:     34 min Therapeutic Exercise:  [] See flow sheet :   Rationale: increase ROM and increase strength to improve the patients ability to , grasp     [address]: 3 ways 15x each   Medium Theraputty: Right hand 10/10   Blue Web: Lumb, Grasp, [place] 15x       With   [] TE   [] TA   [] neuro   [] other: Patient Education: [x] Review HEP    [] Progressed/Changed HEP based on:   [] positioning   [] body mechanics   [] transfers   [] heat/ice application   [] Splint wear/care   [] Sensory re-education   [] scar management      [] other:            Other Objective/Functional Measures:     Stiffness in Right Hand with TE     Pain Level (0-10 scale) post treatment: Right 2-3/10, Left 0/10    ASSESSMENT/Changes in Function: Strength improving     Patient will continue to benefit from skilled OT services to modify and progress therapeutic interventions, address ROM deficits, address strength deficits and instruct in home and community integration to attain remaining goals. []  See Plan of Care  []  See progress note/recertification  []  See Discharge Summary         Progress towards goals / Updated goals:  1.  Finalize home program for strengthening of right hand  2.  Patient will report right hand pain 0-2/10 with routine use for home care tasks Met for today, check for consistency 8/5/19  3.  Patient will increase right hand  at least 5 additional pounds for holding parts at work.   .Progressing with in clinic activities 8/7/19    PLAN  []  Upgrade activities as tolerated     [x]  Continue plan of care  []  Update interventions per flow sheet       []  Discharge due to:_  []  Other:_      ANDREA Williamson 8/7/2019  10:44 AM    Future Appointments   Date Time Provider Dex Hatfield   8/7/2019 11:45 AM Kate ALBARADO [facility]   8/7/2019 12:30 PM Tressa Riojas PTA [facility]CP[facility]   8/12/2019  4:00 PM Susan Moncada Emma Marcos, PTA [facility]   8/12/2019  4:45 PM Chandler Ahumada, OTR/L [facility]   8/14/2019  3:30 PM Agueda Ped MMCPTPB [facility]   8/14/2019  4:00 PM Francisco Javier Liang EEXEMLC [facility]   9/23/2019  3:45 PM Maris Valentino MD [facility] SCHED   10/2/2019  1:00 PM Valentina Chinchilla MD [address]   10/25/2019  8:30 AM Kole Escamilla PA-C [facility] SCHED   11/4/2019  3:30 PM Ector Mcknight  [address]

## 2019-08-09 RX ORDER — WARFARIN SODIUM 5 MG/1
TABLET ORAL
Qty: 75 TAB | Refills: 0 | Status: SHIPPED | OUTPATIENT
Start: 2019-08-09 | End: 2019-09-17 | Stop reason: SDUPTHER

## 2019-08-09 NOTE — PROGRESS NOTES
The patient presents to the office today with the chief complaint of headache    HPI    The patient has chronic headaches. Over the past several days he has \"had a headache that he cannot shake. \"  The patient is status post right shoulder surgery. The shoulder is doing well. The patient remains on Coumadin due to a hypercoagulable state and recurrent DVT. Review of Systems   Respiratory: Negative for shortness of breath. Cardiovascular: Negative for chest pain and leg swelling. Neurological: Positive for headaches. Allergies   Allergen Reactions    Amoxicillin Itching    Augmentin [Amoxicillin-Pot Clavulanate] Itching    Chlorhexidine Towelette Itching    Hibiclens [Chlorhexidine Gluconate] Itching    Milk Containing Products Diarrhea    Penicillins Rash    Requip [Ropinirole] Nausea and Vomiting       Current Outpatient Medications   Medication Sig Dispense Refill    verapamil (CALAN) 120 mg tablet 1 tab twice per day (Stop Labetalol) 60 Tab 2    dexAMETHasone (DECADRON) 0.5 mg tablet 3 tab daily x 2 days, 2 tab day x 2 days, 1 tab daily x 2 days, 1/2 tab daily x 2 days 14 Tab 0    warfarin (COUMADIN) 3 mg tablet TAKE AS DIRECTED PER MD 30 Tab 0    lisinopril-hydroCHLOROthiazide (PRINZIDE, ZESTORETIC) 20-12.5 mg per tablet TAKE 1 TABLET BY MOUTH DAILY 90 Tab 0    lansoprazole (PREVACID) 30 mg capsule TAKE 1 CAPSULE DAILY BEFOREBREAKFAST 90 Cap 3    butalbital-acetaminophen-caff (FIORICET) -40 mg per capsule Take 1 Cap by mouth every eight (8) hours as needed for Pain. 90 Cap 1    diclofenac (VOLTAREN) 1 % gel Apply 4 g to affected area four (4) times daily. Maximum 16 grams per joint per day. Dispense 5 100 gram tubes 5 Each 0    labetalol (NORMODYNE) 100 mg tablet TAKE ONE TABLET BY MOUTH TWICE DAILY (Patient taking differently: Patient states he takes 1 po daily) 180 Tab 0    metaxalone (SKELAXIN) 800 mg tablet Take 1 Tab by mouth three (3) times daily.  Indications: muscle spasm 90 Tab 2    raNITIdine (ZANTAC) 150 mg tablet TK 1 T PO HS  1    ALPRAZolam (XANAX) 0.5 mg tablet Take one half(1/2) tab to one(1) tab by mouth at bedtime as needed for sleep 30 Tab 0    warfarin (COUMADIN) 5 mg tablet TAKE 2 AND 1/2 TABLETS BY MOUTH DAILY OR AS DIRECTED 75 Tab 0    montelukast (SINGULAIR) 10 mg tablet TAKE 1 TABLET BY MOUTH EVERY DAY (Patient taking differently: TAKE 1 TABLET BY MOUTH EVERY HS) 90 Tab 0    acetaminophen (TYLENOL) 500 mg tablet Take 1,000 mg by mouth every six (6) hours as needed for Pain.          Past Medical History:   Diagnosis Date    Arthritis     Bleeding     Chronic pain     knee and shoulder    GERD (gastroesophageal reflux disease)     Headache(784.0)     migraine    High cholesterol     Hypertension     Lower back pain 11/6/2010    Other chest pain     Pure hypercholesterolemia     Right buttock pain 11/6/2010    Right foot pain     Rotator cuff tear     left-since 2010, worsened tear Young.    Rotator cuff tear, right     Spinal stenosis     Tendonitis, tibialis     anterior    Thromboembolus ([facility] [address]     3 after sx last one 2000       Past Surgical History:   Procedure Laterality Date    FOOT/TOES SURGERY PROC UNLISTED      HX BACK SURGERY      HX HEENT Right 09/2018    eye surgery, macular     HX KNEE REPLACEMENT Left     HX ORTHOPAEDIC  06-25-12    Right foot with excision of bursa and adipose tissue from fifth metatarsal base by Dr. Pepe Jones      lower back (1992 and 2000)    HX OTHER SURGICAL      left foot (2008)    HX OTHER SURGICAL      Retina repair     HX PROSTATECTOMY  11/2018    HX ROTATOR CUFF REPAIR Right 01/28/2019    by Dr. More Pearce History     Socioeconomic History    Marital status:      Spouse name: Not on file    Number of children: Not on file    Years of education: Not on file    Highest education level: Not on file   Occupational History    Not on file   Social Needs    Financial resource strain: Not on file    Food insecurity:     Worry: Not on file     Inability: Not on file    Transportation needs:     Medical: Not on file     Non-medical: Not on file   Tobacco Use    Smoking status: Never Smoker    Smokeless tobacco: Never Used   Substance and Sexual Activity    Alcohol use: No    Drug use: No    Sexual activity: Not Currently   Lifestyle    Physical activity:     Days per week: Not on file     Minutes per session: Not on file    Stress: Not on file   Relationships    Social connections:     Talks on phone: Not on file     Gets together: Not on file     Attends Denominational service: Not on file     Active member of club or organization: Not on file     Attends meetings of clubs or organizations: Not on file     Relationship status: Not on file    Intimate partner violence:     Fear of current or ex partner: Not on file     Emotionally abused: Not on file     Physically abused: Not on file     Forced sexual activity: Not on file   Other Topics Concern     Service Not Asked    Blood Transfusions Not Asked    Caffeine Concern Not Asked    Occupational Exposure Not Asked    Hobby Hazards Not Asked    Sleep Concern Not Asked    Stress Concern Not Asked    Weight Concern Not Asked    Special Diet Not Asked    Back Care Not Asked    Exercise Not Asked    Bike Helmet Not Asked   [address] Not Asked    Self-Exams Not Asked   Social History Narrative    Not on file       Patient does not have an advanced directive on file    Visit Vitals  /78 (BP 1 Location: Left arm, BP Patient Position: Sitting)   Pulse 83   Temp 98.8 °F (37.1 °C) (Tympanic)   Ht 5' 10\" (1.778 m)   Wt 233 lb (105.7 kg)   SpO2 96%   BMI 33.43 kg/m²       Physical Exam   Neck: Carotid bruit is not present. Cardiovascular: Normal rate and regular rhythm. Exam reveals no gallop. No murmur heard. Pulmonary/Chest: He has no wheezes.  He has no rales. Abdominal: Soft. He exhibits no distension. There is no tenderness. Musculoskeletal: He exhibits no edema.        Office Visit on 08/06/2019   Component Date Value Ref Range Status    VALID INTERNAL CONTROL POC 08/06/2019 Yes   Final    INR POC 08/06/2019 1.6   Final   Clinical Support on 07/22/2019   Component Date Value Ref Range Status    Prostate Specific Ag 07/22/2019 <0.03   0 - 4 ng/mL Final    Comment: (Methodology: Roche ECLIA)         Clinical Support on 07/08/2019   Component Date Value Ref Range Status    VALID INTERNAL CONTROL POC 07/08/2019 Yes   Final    INR POC 07/08/2019 1.9   Final   Ancillary Procedure on 06/19/2019   Component Date Value Ref Range Status    Right SSV junction reflux 06/19/2019 0.6  s Final    Right SSV junction diameter 06/19/2019 0.95  cm Final    Left SSV junction diameter 06/19/2019 0.54  cm Final    Left CFV Rfx 06/19/2019 0.6  s Final    Left GSV at Knee Diam 06/19/2019 0.26  cm Final    Left GSV at Knee Rfx 06/19/2019 1.4  s Final    Left GSV BK Prox Diam 06/19/2019 0.28  cm Final    Left GSV BK Prox Rfx 06/19/2019 3.0  s Final    Left GSV Junc Diam 06/19/2019 0.50  cm Final    Left GSV Thigh Dist Diam 06/19/2019 0.30  cm Final    Left GSV Thigh Mid Diam 06/19/2019 0.24  cm Final    Left GSV Thigh Prox Diam 06/19/2019 0.26  cm Final    Left Pop Rfx 06/19/2019 2.4  s Final    Left SSV Dist Diam 06/19/2019 0.39  cm Final    Left SSV Mid Diam 06/19/2019 0.42  cm Final    Left SSV Prox Diam 06/19/2019 0.47  cm Final    Left SSV Prox Rfx 06/19/2019 4.4  s Final    Right GSV Junc Diam 06/19/2019 0.46  cm Final    Right GSV Thigh Mid Diam 06/19/2019 0.18  cm Final    Right GSV Thigh Prox Diam 06/19/2019 0.30  cm Final    Right GSV Thight Mid Rfx 06/19/2019 0.8  s Final    Right SSV Dist Diam 06/19/2019 0.45  cm Final    Right SSV Dist Rfx 06/19/2019 1.5  s Final    Right SSV Mid Diam 06/19/2019 0.48  cm Final    Right SSV Prox Diam 06/19/2019 0.60  cm Final    Right SSV Prox Rfx 06/19/2019 3.2  s Final    Right CFV Rfx 06/19/2019 1.2  s Final   Hospital Outpatient Visit on 06/18/2019   Component Date Value Ref Range Status    Factor V Leiden 06/18/2019 Comment*   Final    Comment: (NOTE)  RESULT: SINGLE R506Q MUTATION IDENTIFIED (HETEROZYGOTE)  Factor V Leiden is a specific mutation (R506Q) in the factor V gene  that is associated with an increased risk of venous thrombosis. Factor V Leiden is more resistant to inactivation by activated  protein C. As a result, factor V persists in the circulation leading  to a mild hypercoagulable state. Factor V Leiden has been reported  in patients with deep vein thrombosis, pulmonary embolus, central  retinal vein occulsion, cerebral sinus thrombosis, and hepatic vein  thrombosis. The relative risk of venous thrombosis is increased  approximately 4-8 fold in individuals who are heterozygous. About 3-  8% of the general [place] and  population are heterozygous. The  risk of venous thrombosis increases exponentially in patients with  more than one risk factor, including:  age, surgery, oral  contraceptive use, pregnancy, elevated homocysteine levels, or a  Factor II/prothrombin mutation (D30232N). Additionally, for  i                           ndividuals found to be heterozygous for the Factor V Leiden  mutation, presence of a second mutation, Factor V R2, further  increases the risk if venous thrombosis. Contact [employer]  Customer Service at [phone] for further information on both  the Factor II (Prothrombin) DNA Analysis, and Factor V R2 DNA  Analysis tests. **Genetic counselors are available for health care providers to**   discuss results at [phone]). Methodology:  DNA analysis of the Factor V gene was performed by allele-specific  PCR. The diagnostic sensitivity and specificity is >99% for both.   Molecular-based testing is highly accurate, but as in any  laboratory test, diagnostic errors may occur. All test results must  be combined with clinical information for the most accurate  interpretation. This test was developed and its performance characteristics  determined by [facility]. It has not been cleared or approved by the  Food and Drug Administration. References:  Chhaya BERYR (1996). Clin Lab Med 08:883-896. Spring Swift, PhD, Driss Morfin, PhD, Magdalena Layton M.S., PhD, Iliana Maravilla, PhD, Kandi Taylor, PhD, Jose M Dillon PhD, [facility] [place]  [place] At: [address] [id number]  Chiquita Galicia MD AD:[id number]      Prothrombin time 06/18/2019 21.6* 11.5 - 15.2 sec Final    INR 06/18/2019 1.9* 0.8 - 1.2   Final    Comment:            INR Therapeutic Ranges         (on stable oral anticoagulant):     INDICATION                INR  DVT/PE/Atrial Fib          2.0-3.0  MI/Mechanical Heart Valve  2.5-3.5     Hospital Outpatient Visit on 06/06/2019   Component Date Value Ref Range Status    WBC 06/06/2019 5.0  4.6 - 13.2 K/uL Final    RBC 06/06/2019 4.15* 4.70 - 5.50 M/uL Final    HGB 06/06/2019 12.6* 13.0 - 16.0 g/dL Final    HCT 06/06/2019 38.7  36.0 - 48.0 % Final    MCV 06/06/2019 93.3  74.0 - 97.0 FL Final    MCH 06/06/2019 30.4  24.0 - 34.0 PG Final    MCHC 06/06/2019 32.6  31.0 - 37.0 g/dL Final    RDW 06/06/2019 13.8  11.6 - 14.5 % Final    PLATELET 15/38/1813 049  135 - 420 K/uL Final    MPV 06/06/2019 11.1  9.2 - 11.8 FL Final    CRP, High sensitivity 06/06/2019 >9.5  mg/L Final    Comment: (NOTE)   Value                          Risk    <1.0 mg/L                        Low  1.0-3.0 mg/L                     Average  >3.0 mg/L                        High    CRP is a nonspecific marker of inflammation and conditions other than   atherosclerosis may cause elevated levels.  If first result >3.0 mg/L,   consider repeating at least 2 weeks later with patient in a   metabolically stable state, free of infection or acute illness.  Sed rate, automated 06/06/2019 18  0 - 20 mm/hr Final    Interleukin-6 (IL-6) 06/06/2019 3.3  0.0 - 15.5 pg/mL Final    Comment: (NOTE)  Results for this test are for research purposes only by the assay's  . The performance characteristics of this product have  not been established. Results should not be used as a diagnostic  procedure without confirmation of the diagnosis by another  medically established diagnostic product or procedure. Performed At: [address] [id number]  Cassie Anderson MD [id number]         . Results for orders placed or performed in visit on 08/06/19   AMB POC PT/INR   Result Value Ref Range    VALID INTERNAL CONTROL POC Yes     Prothrombin time (POC)      INR POC 1.6        Assessment / Plan      ICD-10-CM ICD-9-CM    1. Generalized headaches R51 784.0    2. Personal history of venous thrombosis and embolism Z86.718 V12.51 AMB POC PT/INR   3. Status post shoulder surgery Z98.890 V45.89        POC PT low but the patient has just resumed Coumadin - will recheck in one week  Course of Decadron to se if the headache resolves  he was advised to continue his maintenance medications      Follow-up and Dispositions    · Return in about 3 months (around 11/6/2019). I asked Carleen Romero if he has any questions and I answered the questions. Carleen Romero states that he understands the treatment plan and agrees with the treatment plan          THIS DOCUMENT WAS [id number].   IT MAY CONTAIN TRANSCRIPTION ERRORS

## 2019-08-12 ENCOUNTER — HOSPITAL ENCOUNTER (OUTPATIENT)
Dept: PHYSICAL THERAPY | Age: 64
Discharge: HOME | End: 2019-08-12
Payer: MEDICARE

## 2019-08-12 PROCEDURE — 97110 THERAPEUTIC EXERCISES: CPT

## 2019-08-12 NOTE — PROGRESS NOTES
OT DAILY TREATMENT NOTE 10-18    Patient Name: Trung Painter  Date:2019  : 1955  [x]  Patient  Verified  [place]or: [place] MED[place]ARE / Plan: [place] MEDICARE PART A & B / Product Type: Medicare /    In time:445  Out time:530  Total Treatment Time (min): 45  Visit #: 3 of 8    Medicare/BCBS Only   Total Timed Codes (min):  30 1:1 Treatment Time:  45     Treatment Area: Pain in right hand [M79.641]  Pain in left hand [M79.642]    SUBJECTIVE  Pain Level (0-10 scale): 2/10 right 1/10 left  Any medication changes, allergies to medications, adverse drug reactions, diagnosis change, or new procedure performed?: [x] No    [] Yes (see summary sheet for update)  Subjective functional status/changes:   [] No changes reported  I feel like my hands are a lot better  Going back to work this week    OBJECTIVE    Modality rationale: decrease pain and increase tissue extensibility to improve the patients ability to grasp   Min Type Additional Details    [] Estim:  []Unatt       []IFC  []Premod                        []Other:  []w/ice   []w/heat  Position:  Location:    [] Estim: []Att    []TENS instruct  []NMES                    []Other:  []w/US   []w/ice   []w/heat  Position:  Location:    []  Traction: [] Cervical       []Lumbar                       [] Prone          []Supine                       []Intermittent   []Continuous Lbs:  [] before manual  [] after manual    []  Ultrasound: []Continuous   [] Pulsed                           []1MHz   []3MHz W/cm2:  Location:    []  Iontophoresis with dexamethasone         Location: [] Take home patch   [] In clinic   15 []  Ice     [x]  heat  []  Ice massage  []  Laser   []  Anodyne Position:  Location:right hand    []  Laser with stim  []  Other:  Position:  Location:    []  Vasopneumatic Device Pressure:       [] lo [] med [] hi   Temperature: [] lo [] med [] hi       [x] Skin assessment post-treatment:  [x]intact []redness- no adverse reaction    []redness - adverse reaction: 30 min Therapeutic Exercise:  [] See flow sheet :   Rationale: increase strength to improve the patients ability to grasp  Right hand:  Increased therabar to blue x 15 reps each   Increased putty to med firm with pegs x 10  continued blue web  pull bow pull and lumb pull 15  Yellow digiflex perfect O right hand x 15  Chinese balls 20x each    With   [] TE   [] TA   [] neuro   [] other: Patient Education: [x] Review HEP    [] Progressed/Changed HEP based on: golf ball swap  [] positioning   [] body mechanics   [] transfers   [] heat/ice application   [] Splint wear/care   [] Sensory re-education   [] scar management      [] other:            Other Objective/Functional Measures:   Adonis to use blue therabar without complaint of pain     Pain Level (0-10 scale) post treatment: 3/10 right 1/10 left    ASSESSMENT/Changes in Function: Improving strength    Patient will continue to benefit from skilled OT services to modify and progress therapeutic interventions, address strength deficits, analyze and address soft tissue restrictions, analyze and cue movement patterns and instruct in home and community integration to attain remaining goals. []  See Plan of Care  []  See progress note/recertification  []  See Discharge Summary         Progress towards goals / Updated goals:  *1.  Finalize home program for strengthening of right hand  2.  Patient will report right hand pain 0-2/10 with routine use for home care tasks Met for today, check for consistency 8/5/19, met 8/12/19  3.  Patient will increase right hand  at least 5 additional pounds for holding parts at work.   .Progressing with in clinic activities 8/7/19, increased therabar and putty today 8/12/19    PLAN  []  Upgrade activities as tolerated     [x]  Continue plan of care  []  Update interventions per flow sheet       []  Discharge due to:_  []  Other:_      Kei Ornelas OTR/L 8/12/2019  6:19 PM    Future Appointments   Date Time Provider Department [place]   8/20/2019  4:30 PM Cinda Braxton, PTA [facility]   8/20/2019  5:00 PM Malik James [facility]   9/23/2019  3:45 PM Chicho Ayon MD [facility] SCHED   10/2/2019  1:00 PM Gene Chinchilla MD [place]   10/25/2019  8:30 AM Parveen Aparicio PA-C [facility] SCHED   11/4/2019  3:30 PM Rosa Jensen  [address]

## 2019-08-12 NOTE — PROGRESS NOTES
PT DAILY TREATMENT NOTE 10-18    Patient Name: Gianna Cohen  Date:2019  : 1955  [x]  Patient  Verified  Payor: [place] MEDICARE / Plan: [place] MEDICARE PART A & B / Product Type: Medicare /    In time:4:00  Out time:4:45  Total Treatment Time (min)45  Visit #: 9 of     Medicare/BCBS Only   Total Timed Codes (min):  35 1:1 Treatment Time:  15       Treatment Area: Presence of left artificial knee joint [Z96.652]  Iliotibial band syndrome, left leg [M76.32]    SUBJECTIVE  Pain Level (0-10 scale): 2/10  Any medication changes, allergies to medications, adverse drug reactions, diagnosis change, or new procedure performed?: [] No    [x] Yes (see summary sheet for update)  Subjective functional status/changes:   [] No changes reported  Pt reports the exercises the last few weeks have seemed to really help his pain and stability. He returns to work tomorrow and is hoping to finish up with therapy soon if that goes well. He is most concerned about walking on uneven surfaces especially if there are potholes. OBJECTIVE    10 minutes of ice to the left knee to reduce inflammation and pain for ease of ambulation       35 min Therapeutic Exercise:  [x] See flow sheet :   Rationale: increase ROM, increase strength, improve coordination and improve balance to improve the patients ability to increase ease of ambulation. With   [] TE   [] TA   [] neuro   [] other: Patient Education: [x] Review HEP    [] Progressed/Changed HEP based on:   [] positioning   [] body mechanics   [] transfers   [] heat/ice application    [] other:      Other Objective/Functional Measures:   No increased pain during session  Ambulated outdoors on gravel and grass without any issues  Reciprocal pattern on stairs with unilateral handrail after cueing  Discussed potential D/C if return to work goes well    Pain Level (0-10 scale) post treatment: 0/10    ASSESSMENT/Changes in Function: Pt is progressing well towards final goals. He is returning to work tomorrow to assess how his left knee does with walking and endurance. Will reassess next session in anticipation of D/C. Progress towards goals / Updated goals:  1. Pt will be independent with HEP to maintain progression.  MET (6/17/19)     Long term goals: To be accomplished within 8 weeks  1. Pt will improve FOTO score by 10 points to 57/100 to show improvement with functional mobility performance. 2. Pt will have Left knee AROM improved to 0-115 degs at least to amb household and community with normal and safe pattern. Not met: 106 degrees (6/21/19)  3. Pt will have B hip abd, ext strength improved to at least 4/5 to be able to amb longer distance and navigate stair safely. Slowly progressing. 7/17/19  4.  Pt will report no more than 1-2/10 pain at worst so he can amb and perform ADLs with ease.      PLAN  [x]  Upgrade activities as tolerated     [x]  Continue plan of care  []  Update interventions per flow sheet       []  Discharge due to:_  []  Other:_      Steven Sommers PTA 8/12/2019  8:01 AM    Future Appointments   Date Time Provider [place]   8/12/2019  4:00 PM Bernarda Chavez [facility]   8/12/2019  4:45 PM Laura Marcano OTR/L [facility]   8/20/2019  4:30 PM Kiki Purdy PTA [facility]   8/20/2019  5:00 PM Vee Erickson ODIIPNX [facility]   9/23/2019  3:45 PM Tejas Farmer MD [facility] SCHED   10/2/2019  1:00 PM Karissa Chinchilla MD [id number]   10/25/2019  8:30 AM July Dang PA-C [facility] SCHED   11/4/2019  3:30 PM Carin Baker  [address] Size Of Lesion In Cm: 0.6

## 2019-08-14 ENCOUNTER — APPOINTMENT (OUTPATIENT)
Dept: PHYSICAL THERAPY | Age: 64
End: 2019-08-14
Payer: MEDICARE

## 2019-08-15 ENCOUNTER — APPOINTMENT (OUTPATIENT)
Dept: PHYSICAL THERAPY | Age: 64
End: 2019-08-15
Payer: MEDICARE

## 2019-08-15 RX ORDER — MONTELUKAST SODIUM 10 MG/1
TABLET ORAL
Qty: 90 TAB | Refills: 0 | Status: SHIPPED | OUTPATIENT
Start: 2019-08-15 | End: 2019-09-29 | Stop reason: SDUPTHER

## 2019-08-20 ENCOUNTER — HOSPITAL ENCOUNTER (OUTPATIENT)
Dept: PHYSICAL THERAPY | Age: 64
Discharge: HOME | End: 2019-08-20
Payer: MEDICARE

## 2019-08-20 PROCEDURE — 97018 PARAFFIN BATH THERAPY: CPT

## 2019-08-20 PROCEDURE — 97110 THERAPEUTIC EXERCISES: CPT

## 2019-08-20 NOTE — PROGRESS NOTES
OT DAILY TREATMENT NOTE 10-18    Patient Name: Leidy Jensen  Date:2019  : 1955  [x]  Patient  Verified  Payor: VA MEDICARE / Plan: VA MEDICARE PART A & B / Product Type: Medicare /    In time:505  Out time:547  Total Treatment Time (min): 42  Visit #: 4 of 8    Medicare/BCBS Only   Total Timed Codes (min):  32 1:1 Treatment Time:  42     Treatment Area: Pain in right hand [M79.641]  Pain in left hand [M79.642]    SUBJECTIVE  Pain Level (0-10 scale): Right: 1.5/10. Left 0.5/10  Any medication changes, allergies to medications, adverse drug reactions, diagnosis change, or new procedure performed?: [x] No    [] Yes (see summary sheet for update)  Subjective functional status/changes:   [] No changes reported  I have been using my hands a lot at work.      OBJECTIVE  Modality rationale: decrease pain and increase tissue extensibility to improve the patients ability to grasp   Min Type Additional Details      [] Estim:  []Unatt       []IFC  []Premod                        []Other:  []w/ice   []w/heat  Position:  Location:      [] Estim: []Att    []TENS instruct  []NMES                    []Other:  []w/US   []w/ice   []w/heat  Position:  Location:      []  Traction: [] Cervical       []Lumbar                       [] Prone          []Supine                       []Intermittent   []Continuous Lbs:  [] before manual  [] after manual      []  Ultrasound: []Continuous   [] Pulsed                           []1MHz   []3MHz W/cm2:  Location:      []  Iontophoresis with dexamethasone         Location: [] Take home patch   [] In clinic      []  Ice     []  heat  []  Ice massage  []  Laser   []  Anodyne Position:  Location:    10  []  Laser with stim  [x]  Other: Paraffin 10 mins Position:  Location:both hands      []  Vasopneumatic Device Pressure:       [] lo [] med [] hi   Temperature: [] lo [] med [] hi       [x] Skin assessment post-treatment:  [x]intact []redness- no adverse reaction  []redness - adverse reaction:     32 min Therapeutic Exercise:  [] See flow sheet :   Rationale: increase strength to improve the patients ability to     Med Firm theraputty: Right 10/10  Blue Therabar: 15x each   Yellow digiflex perfect O right hand 15x  Blue web  pull bow pull and lumb pull 15x each       With   [] TE   [] TA   [] neuro   [] other: Patient Education: [x] Review HEP    [] Progressed/Changed HEP based on:   [] positioning   [] body mechanics   [] transfers   [] heat/ice application   [] Splint wear/care   [] Sensory re-education   [] scar management      [] other:            Other Objective/Functional Measures:     No reports of pain/discomfort with exercises      Pain Level (0-10 scale) post treatment: Right-1/10, Left-0/10    ASSESSMENT/Changes in Function: Strength improving     Patient will continue to benefit from skilled OT services to modify and progress therapeutic interventions, address ROM deficits, address strength deficits and instruct in home and community integration to attain remaining goals.      []  See Plan of Care  []  See progress note/recertification  []  See Discharge Summary         Progress towards goals / Updated goals:  1.  Finalize home program for strengthening of right hand    2.  Patient will report right hand pain 0-2/10 with routine use for home care tasks   Status at Eval/Last Progress Note:3/10  Status at Last Visit: Goal Met 8/20/19    3.  Patient will increase right hand  at least 5 additional pounds for holding parts at work.    Status at [id number] [employer] Note:42   Status at Last Visit: Progressing with in clinic activities 8/7/19, increased therabar and putty today 8/12/19    PLAN  []  Upgrade activities as tolerated     [x]  Continue plan of care  []  Update interventions per flow sheet       []  Discharge due to:_  []  Other:_      Ojgiovanna Gunter ,HARVEY/L 8/20/2019  1:15 PM    Future Appointments   Date Time Provider Dex [place]   9/23/2019  3:45 PM Anil Astudillo MD [facility]   10/2/2019  1:00 PM Given, MD [place]   10/25/2019  8:30 AM ESPERANZA Joe [address]   11/4/2019  3:30 PM Eliazar Arnett  [address] [place] [address]

## 2019-08-20 NOTE — PROGRESS NOTES
PT DAILY TREATMENT NOTE 10-18    Patient Name: Deanne Peacock  Date:2019  : 1955  [x]  Patient  Verified  Payor: VA MEDICARE / Plan: VA MEDICARE PART A & B / Product Type: Medicare /    In time:4:30  Out time:5:10  Total Treatment Time (min)40  Visit #: 10 of     Medicare/BCBS Only   Total Timed Codes (min):  40 1:1 Treatment Time:  25       Treatment Area: Presence of left artificial knee joint [Z96.652]  Iliotibial band syndrome, left leg [M76.32]    SUBJECTIVE  Pain Level (0-10 scale): 2/10  Any medication changes, allergies to medications, adverse drug reactions, diagnosis change, or new procedure performed?: [] No    [x] Yes (see summary sheet for update)  Subjective functional status/changes:   [] No changes reported  Pt states he is now back to work and just tired more than anything building up his endurance. He is cautious walking in the parking lot where there are pot holes. He is able to stand up with an easier time from chairs with less pain. His pain at worst is 3/10. He has only had 2 minor episodes of buckling but much improved since shifting his exercise focus. He is going to eventually look at maybe going to a gym when he has more income but for now will workout at home. OBJECTIVE       40 min Therapeutic Exercise:  [x] See flow sheet :   Rationale: increase ROM, increase strength, improve coordination and improve balance to improve the patients ability to increase ease of ambulation.              With   [] TE   [] TA   [] neuro   [] other: Patient Education: [x] Review HEP    [] Progressed/Changed HEP based on:   [] positioning   [] body mechanics   [] transfers   [] heat/ice application    [] other:      Other Objective/Functional Measures:   Left knee AROM 0-115 degrees  FOTO: 60  Hip abduction: 4/5  Hip extension: 4-/5  Pain average 3/10  Discussed continuing exercises at home and gym with progression of weights using machines or ankle weights for resistance  Pt with no other concerns or questions and agreeable to D/C    Pain Level (0-10 scale) post treatment: 0/10    ASSESSMENT/Changes in Function: Mr. Bri Mckeon progressed well in therapy meeting 3/5 final goals. He is independent with strengthening to continue to improve stability of the left knee to reduce buckling and pain with sit to stand transfers. He has improved left knee ROM and B hip strength. He is only having 3/10 pain at worst now. He has returned to work and will continue with self progression at home and the gym. Skilled PT is no longer necessary and pt will D/C at this time. Progress towards goals / Updated goals:  1. Pt will be independent with HEP to maintain progression.  MET (6/17/19)   Long term goals: To be accomplished within 8 weeks  1. Pt will improve FOTO score by 10 points to 57/100 to show improvement with functional mobility performance. MET  2. Pt will have Left knee AROM improved to 0-115 degs at least to amb household and community with normal and safe pattern. Met 0-115 degrees  3. Pt will have B hip abd, ext strength improved to at least 4/5 to be able to amb longer distance and navigate stair safely. Abduction 4/5 extension 4-/5  4. Pt will report no more than 1-2/10 pain at worst so he can amb and perform ADLs with ease. Progressed  3/10     PLAN  []  Upgrade activities as tolerated     []  Continue plan of care  []  Update interventions per flow sheet       [x]  Discharge due to: Independent with exercises and returned to work.   []  Other:_      Emeka Valentino, BIRGIT 8/20/2019  8:01 AM    Future Appointments   Date Time Provider [facility]   9/23/2019  3:45 PM MD CARLY Dillon-REBECCA OLIVEIRA SCHED   10/2/2019  1:00 PM Valentina Chinchilla MD [address]   10/25/2019  8:30 AM ESPERANZA [address]   11/4/2019  3:30 PM Ector Mcknight  [address]

## 2019-08-21 NOTE — PROGRESS NOTES
[facility] [employer] [facility]  [address] [place] [address]  [phone] fax    Continued Plan of Care/ Re-certification for Physical Therapy Services    Patient name: Reji Winters Start of Care: 2019   Referral source: Debbie Brown PA : 1955   Medical/Treatment Diagnosis: Presence of left artificial knee joint [Z96.652]  Iliotibial band syndrome, left leg [M76.32]  Payor: VA MEDICARE / Plan: VA MEDICARE PART A & B / Product Type: Medicare /  Onset Date:about 6 months ago                Prior Hospitalization: see medical history Provider#: [id number]   Medications: Verified on Patient Summary List     Comorbidities: arthritis, HTN, Left TKR 3-2018, prostate cancer surgery  Prior Level 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 Status at last note/certification: 95/787  Current Status: met    Goal: Pt will report 60% improvement in knee pain to aide in sit to stand transfers after prolonged sitting. Status at last note/certification: progressing  Current Status: met    Goal:Pt will have B hip abd, ext strength improved to at least 4/5 to be able to amb longer distance and navigate stair safely.   Status at last note/certification:Hip abduction MMT: left 4/5 right 4-/5; Hip extension MMT: left 4-/5 right 3+/5    Current Status: not met    Goal:Pt will be independent with an updated HEP to continue self progression at home and pain management upon D/C.   .  Status at last note/certification: will review next visit  Current Status: not met    Key functional changes: ROM, hip strength, decreased episodes of buckling      Problems/ barriers to goal attainment: none      Problem List: pain affecting function, decrease ROM, decrease strength and decrease flexibility/ joint mobility    Treatment Plan: Therapeutic exercise, Therapeutic activities, Neuromuscular re-education, Physical agent/modality, Manual therapy and Patient education     Patient Goal (s) has been updated and includes: get stronger     Goals for this certification period to be accomplished in 4 weeks:  1. Pt will have B hip abd, ext strength improved to at least 4/5 to be able to amb longer distance and navigate stair safely. 2.Pt will be independent with an updated HEP to continue self progression at home and pain management upon D/C. Frequency / Duration: Patient to be seen 2 times per week for 2 sessions:    Assessment / Recommendations:Patient is progressing well with therapy and has met 2/4 final goals. He reports decreased episodes of buckling and has returned to work. Pt continues to present with decreased hip extension strength. Patient will benefit from 2 more therapy sessions to review final HEP and exercise progression to continue strengthening independently at home/gym upon discharge from PT. Certification Period: 8/12/2019 to 9/9/2019    Susu Ramirez, PT 8/21/2019 6:39 PM    ________________________________________________________________________  I certify that the above Therapy Services are being furnished while the patient is under my care. I agree with the treatment plan and certify that this therapy is necessary. [] I have read the above and request that my patient continue as recommended.   [] I have read the above report and request that my patient continue therapy with the following changes/special instructions: _______________________________________  [] I have read the above report and request that my patient be discharged from therapy    Physician's Signature:____________Date:_________TIME:________    ** Signature, Date and Time must be completed for valid certification **    Please sign and return to [facility]  [address] [place] [address]  [phone] fax

## 2019-08-27 RX ORDER — BUTALBITAL, ACETAMINOPHEN AND CAFFEINE 300; 40; 50 MG/1; MG/1; MG/1
1 CAPSULE ORAL
Qty: 90 CAP | Refills: 1 | Status: SHIPPED | OUTPATIENT
Start: 2019-08-27 | End: 2019-09-17 | Stop reason: SDUPTHER

## 2019-08-28 ENCOUNTER — APPOINTMENT (OUTPATIENT)
Dept: PHYSICAL THERAPY | Age: 64
End: 2019-08-28
Payer: MEDICARE

## 2019-08-28 RX ORDER — NYSTATIN 100000 U/G
CREAM TOPICAL
Qty: 15 G | Refills: 0 | Status: SHIPPED | OUTPATIENT
Start: 2019-08-28 | End: 2019-10-25 | Stop reason: ALTCHOICE

## 2019-09-03 ENCOUNTER — HOSPITAL ENCOUNTER (OUTPATIENT)
Dept: PHYSICAL THERAPY | Age: 64
End: 2019-09-03
Payer: MEDICARE

## 2019-09-03 ENCOUNTER — TELEPHONE (OUTPATIENT)
Dept: FAMILY MEDICINE CLINIC | Facility: CLINIC | Age: 64
End: 2019-09-03

## 2019-09-03 RX ORDER — LABETALOL 100 MG/1
TABLET, FILM COATED ORAL
Qty: 180 TAB | Refills: 0 | Status: SHIPPED | OUTPATIENT
Start: 2019-09-03 | End: 2019-09-23 | Stop reason: SDUPTHER

## 2019-09-03 NOTE — PROGRESS NOTES
[facility] [place] [employer] [facility]  [address] [facility] [place]  [phone] fax    Continued Plan of Care/ Re-certification for Occupational Therapy Services    Patient name: Leidy Jensen Start of Care: 6/3/19   Referral source: Bhavya Khan MD : 1955   Medical/Treatment Diagnosis: Pain in right hand [M79.641]  Pain in left hand [M79.642]  Payor: VA MEDICARE / Plan: VA MEDICARE PART A & B / Product Type: Medicare /  Onset Date:6 months     Prior Hospitalization: see medical history Provider#: [id number]   Medications: Verified on Patient Summary List    Comorbidities: Right shoulder pain , TKR, back surgery, HTN, cataract surgery, prostate CA  Prior Level of Function:Parts delivery, I self care home care driving, camping  Visits from Start of Care: 19    Missed Visits: 0    The Plan of Care and following information is based on the patient's current status:    Goal:1.  Finalize home program for strengthening of right hand   Status at last note/certification:Needed update  Current Status: not met Missed visit due to return to work    2. Abida Steinberg will report right hand pain 0-2/10 with routine use for home care tasks   Status at [facility] Progress Note:310  Status at Last Visit: Goal Met 19     .3.    Patient will increase right hand  at least 5 additional pounds for holding parts at work.    Status at [facility] Progress Note:42   Status at Last Visit: Progressing with in clinic activities .   Not able to reassess due ot missed last visit     Key functional changes: Improved strength in clinic tasks, pain , hand use     Problems/ barriers to goal attainment: REcent missed visits due to work     Treatment Plan: Therapeutic exercise, Therapeutic activities, Physical agent/modality, Patient education and ADLs/IADLs      Patient Goal (s) has been updated and includes: Better strength     Goals for this certification period to be accomplished in 1 treatments:  Goal:1.  Finalize home program for strengthening of right hand   Status at last note/certification:Needed update    . 3.    Patient will increase right hand  at least 5 additional pounds for holding parts at work.    Status at [facility] Progress Note:42     Frequency / Duration: Patient to be seen 1 times per week for 1 treatments:    Assessment / Recommendations:Patient will benefit from completion of previously scheduled suession to update HEP. Certification Period: 9/1/19-9/30/19    BEKA Julian 9/3/2019 4:38 PM    ________________________________________________________________________  I certify that the above Therapy Services are being furnished while the patient is under my care. I agree with the treatment plan and certify that this therapy is necessary.       Physician's Signature:____________Date:_________TIME:________    ** Signature, Date and Time must be completed for valid certification **      Please sign and return to [facility]  [address] [facility]            [phone] fax

## 2019-09-10 ENCOUNTER — TELEPHONE (OUTPATIENT)
Dept: ORTHOPEDIC SURGERY | Age: 64
End: 2019-09-10

## 2019-09-10 DIAGNOSIS — Z96.652 HISTORY OF TOTAL LEFT KNEE REPLACEMENT (TKR): ICD-10-CM

## 2019-09-10 DIAGNOSIS — M25.562 CHRONIC PAIN OF LEFT KNEE: Primary | ICD-10-CM

## 2019-09-10 DIAGNOSIS — G89.29 CHRONIC PAIN OF LEFT KNEE: Primary | ICD-10-CM

## 2019-09-10 NOTE — TELEPHONE ENCOUNTER
Patient's wife, Mary Grace Callahan (on HIPP) called stating the patient was told that he could possibly be given a knee brace to help with his knee being weak and buckling a lot. She was wondering if that can be done so his knee can become stronger. Please advise Susan at [phone].

## 2019-09-10 NOTE — TELEPHONE ENCOUNTER
Attempted to contact patient/patient's wife to notify them of CARA Tapia's message, unable to leave a voicemail. If they call back please let them know that we are faxing an order over to [facility] for a custom left hinged knee brace and the patient will need to contact their office to schedule an appointment to be measured for the brace. I did put on the cover sheet for Carlos to contact the patient due to not being able to reach the patient myself. [address]  [address] [place] [address]  Phone: [phone]  Fax: [phone]

## 2019-09-11 ENCOUNTER — HOSPITAL ENCOUNTER (OUTPATIENT)
Dept: PHYSICAL THERAPY | Age: 64
Discharge: HOME | End: 2019-09-11
Payer: MEDICARE

## 2019-09-11 PROCEDURE — 97018 PARAFFIN BATH THERAPY: CPT

## 2019-09-11 PROCEDURE — 97110 THERAPEUTIC EXERCISES: CPT

## 2019-09-11 NOTE — PROGRESS NOTES
[facility] [place] [employer] [facility]  [phone] fax      Patient Name: Rosemary Vyas  : 1955      Occupational Therapy Discharge Instructions        Continue with home exercise program for 1-3 times a day     Continue with    [] Ice  as needed    [x] Heat           Follow up with MD:     [] Upon completion of therapy     [x] As needed      Recommendations:    []   Return to activity with home program                    [x]  Return to activity with the following modifications :  Joint Protection                              []  Practice Hand coordination activities                                      []  Wear Splint as prescribed:                    [] Return to/Join local gym        Additional comments:  - Continue Thumb ROM Exercises   - Continue Hand Strengthening Exercises         Please call with any questions or concerns. Thank you for your participation.          NADREA Hall  2019  3:48 PM

## 2019-09-11 NOTE — PROGRESS NOTES
OT DAILY TREATMENT NOTE 10-18    Patient Name: Mirta Hodgkins  Date:2019  : 1955  [x]  Patient  Verified  Payor: VA MEDICARE / Plan: VA MEDICARE PART A & B / Product Type: Medicare /    In time:527  Out time:610  Total Treatment Time (min): 42  Visit #: 1 of 1    Medicare/BCBS Only   Total Timed Codes (min):  32 1:1 Treatment Time:  42     Treatment Area: Pain in right hand [M79.641]  Pain in left hand [M79.642]    SUBJECTIVE  Pain Level (0-10 scale): Right 1-2/10, Left 0-1/10  Any medication changes, allergies to medications, adverse drug reactions, diagnosis change, or new procedure performed?: [x] No    [] Yes (see summary sheet for update)  Subjective functional status/changes:   [] No changes reported  Oh yeah I can tell the difference. I am excited to see what my  is.       OBJECTIVE         Modality rationale: decrease pain and increase tissue extensibility to improve the patients ability to grasp   Min Type Additional Details       [] Estim:  []Unatt       []IFC  []Premod                        []Other:  []w/ice   []w/heat  Position:  Location:       [] Estim: []Att    []TENS instruct  []NMES                    []Other:  []w/US   []w/ice   []w/heat  Position:  Location:       []  Traction: [] Cervical       []Lumbar                       [] Prone          []Supine                       []Intermittent   []Continuous Lbs:  [] before manual  [] after manual       []  Ultrasound: []Continuous   [] Pulsed                           []1MHz   []3MHz W/cm2:  Location:       []  Iontophoresis with dexamethasone         Location: [] Take home patch   [] In clinic       []  Ice     []  heat  []  Ice massage  []  Laser   []  Anodyne Position:  Location:     10  []  Laser with stim  [x]  Other: Paraffin 10 mins Position:  Location:both hands       []  Vasopneumatic Device Pressure:       [] lo [] med [] hi   Temperature: [] lo [] med [] hi        [x] Skin assessment post-treatment:  [x]intact []redness- no adverse reaction  []redness - adverse reaction:     32 min Therapeutic Exercise:  [] See flow sheet :   Rationale: increase ROM and increase strength to improve the patients ability to     Recheck: ROM, , Pinches- B Hands  Med Firm theraputty: 5/5 each hand  DC instructions  DC HEP Review         With   [] TE   [] TA   [] neuro   [] other: Patient Education: [x] Review HEP    [] Progressed/Changed HEP based on:   [] positioning   [] body mechanics   [] transfers   [] heat/ice application   [] Splint wear/care   [] Sensory re-education   [] scar management      [] other:            Other Objective/Functional Measures:      Measurements: Taken with Carlo Dynamometer, in Lbs   Level 2 6/3/19  Eval 7/1 7/31 9/11 Change    Right 35 43 42 55  +20   Left 40 60 64 70 +30          Pinch Measurements: Taken with Pinch Gauge, in Lbs   (hand) 6/3/19 7/1 7/31 9/11 Change   3 pt            Right 7 14 15 16 +9   Left  8 10 12 13 +5                           Lateral           Right 12 19 21 21 +9   Left 11 11 16 19 +8                           Tip           Right 6 11 11 11 +5   Left 7 10 10 10 +3                                 Thumb- Radial Abduction   6/3  Eval 7/1 7/31 9/11 Change   Right 15 30 35 40 +25   Left 45 50 58 55 +10          Thumb- Palmar Abduction   6/3  Eval 7/1 7/31 9/11 Change   Right 42 40 45 50 +8   Left 55 55 58 60 +5         FOTO   6/3  Eval 7/1 7/31 9/11   Score 53 63 63 74   Change  +10 +10 +21       Pain Level (0-10 scale) post treatment: 1/10 B Hands    ASSESSMENT/Changes in Function: Strength improvement, ROM improvement        []  See Plan of Care  []  See progress note/recertification  [x]  See Discharge Summary         Progress towards goals / Updated goals:  Goals for this certification period to be accomplished in 1 treatments:  Goal:1.  Finalize home program for strengthening of right hand  Status at last note/certification:Needed update  Status at last note: Goal Met 9/11/19     .3.    Patient will increase right hand  at least 5 additional pounds for holding parts at work.    Status at [facility] Progress Note:42   Status at last note: Goal Met , 55# + 20  9/11/19    PLAN  []  Upgrade activities as tolerated     []  Continue plan of care  []  Update interventions per flow sheet       [x]  Discharge due to:_ Goals Met  []  Other:_      Hilda Sanches ,HARVEY/L 9/11/2019  3:42 PM    Future Appointments   Date Time Provider Dex [place]   9/11/2019  5:30 PM Shimon Medina DQU[facility]HR [facility]   9/19/2019  5:00 PM TEODORO Pimentel/EFFIE MMCPTPB [facility]   9/23/2019  3:45 PM Kira Henry MD [facility]   10/2/2019  1:00 PM Given, Luis Armando Mendez MD [address]   10/25/2019  8:30 AM ESPERANZA Mcgregor [address]   11/4/2019  3:30 PM Gila Marsh  [address]

## 2019-09-17 RX ORDER — WARFARIN SODIUM 5 MG/1
TABLET ORAL
Qty: 75 TAB | Refills: 0 | Status: SHIPPED | OUTPATIENT
Start: 2019-09-17 | End: 2019-11-16 | Stop reason: SDUPTHER

## 2019-09-17 RX ORDER — BUTALBITAL, ACETAMINOPHEN AND CAFFEINE 300; 40; 50 MG/1; MG/1; MG/1
1 CAPSULE ORAL
Qty: 90 CAP | Refills: 1 | Status: SHIPPED | OUTPATIENT
Start: 2019-09-17 | End: 2019-09-23 | Stop reason: SDUPTHER

## 2019-09-17 NOTE — TELEPHONE ENCOUNTER
Last seen 08/06/19  Next appt  09/23/19    Requested Prescriptions     Pending Prescriptions Disp Refills    butalbital-acetaminophen-caff (FIORICET) -40 mg per capsule 90 Cap 1     Sig: Take 1 Cap by mouth every eight (8) hours as needed for Pain.

## 2019-09-18 NOTE — PROGRESS NOTES
[facility] - Katelyn Elias  [address] [facility]  [phone] fax    Occupational Therapy Discharge Summary  Patient name: Elida Rose Start of Care: 6/3/19   Referral source: Jaun Romero MD : 1955   Medical/Treatment Diagnosis: Pain in right hand [M79.641]  Pain in left hand [M79.642]  Payor: [place] MEDICARE / Plan: [place] MEDICARE PART A & B / Product Type: Medicare /  Onset Date:6 months     Prior Hospitalization: see medical history Provider#: [id number]   Medications: Verified on Patient Summary List    Comorbidities: *Right shoulder pain , TKR, back surgery, HTN, cataract surgery, prostate CA**  Prior Level of Function:*Parts delivery, I self care home care driving, camping**    Visits from Start of Care: 20    Missed Visits: 0    Reporting Period : 19 to 19    Summary of Care:Patient was seen for modalities, therapeutic exercises and activities to improve hand function. S/he has HEP.        Measurements: Taken with Carlo Dynamometer, in Lbs   Level 2 6/3/19  Eval  Change    Right 35 43 42 55  +20   Left 40 60 64 70 +30          Pinch Measurements: Taken with Pinch Gauge, in Lbs   (hand) 6/3/19 7/1 7/31 9/11 Change   3 pt             Right 7 14 15 16 +9   Left  8 10 12 13 +5                               Lateral             Right 12 19 21 21 +9   Left 11 11 16 19 +8                               Tip             Right 6 11 11 11 +5   Left 7 10 10 10 +3                                   Thumb- Radial Abduction    3  Eval  Change   Right 15 30 35 40 +25   Left 45 50 58 55 +10          Thumb- Palmar Abduction    /3  Eval  Change   Right 42 40 45 50 +8   Left 55 55 58 60 +5         FOTO    6/3  Eval    Score 53 63 63 74   Change   +10 +10 +21      Goal:1.  Finalize home program for strengthening of right hand  Status at last note/certification:Needed update  Status at last note: Goal Met 9/11/19     Goal .3.    Patient will increase right hand  at least 5 additional pounds for holding parts at work.    Status at [facility] [place] Progress Note:42   Status at last note: Goal Met , 55# + 20  9/11/19    ASSESSMENT/Changes in Function: Improved had strength, function, ROM.     ASSESSMENT/RECOMMENDATIONS:  [x]Discontinue therapy: [x]Patient has reached or is progressing toward set goals      []Patient is non-compliant or has abdicated      []Due to lack of appreciable progress towards set goals    TEODORO Campbell/L 9/18/2019 9:18 AM

## 2019-09-19 ENCOUNTER — APPOINTMENT (OUTPATIENT)
Dept: PHYSICAL THERAPY | Age: 64
End: 2019-09-19
Payer: MEDICARE

## 2019-09-19 ENCOUNTER — OFFICE VISIT (OUTPATIENT)
Dept: ORTHOPEDIC SURGERY | Age: 64
End: 2019-09-19

## 2019-09-19 VITALS
DIASTOLIC BLOOD PRESSURE: 81 MMHG | SYSTOLIC BLOOD PRESSURE: 131 MMHG | HEIGHT: 70 IN | TEMPERATURE: 97 F | RESPIRATION RATE: 18 BRPM | WEIGHT: 230 LBS | OXYGEN SATURATION: 96 % | HEART RATE: 79 BPM | BODY MASS INDEX: 32.93 KG/M2

## 2019-09-19 DIAGNOSIS — M70.52 PES ANSERINUS BURSITIS OF LEFT KNEE: ICD-10-CM

## 2019-09-19 DIAGNOSIS — M76.32 ILIOTIBIAL BAND TENDINITIS OF LEFT SIDE: ICD-10-CM

## 2019-09-19 DIAGNOSIS — Z96.652 HISTORY OF TOTAL LEFT KNEE REPLACEMENT (TKR): Primary | ICD-10-CM

## 2019-09-19 DIAGNOSIS — M65.9 SYNOVITIS OF KNEE: ICD-10-CM

## 2019-09-19 NOTE — PROGRESS NOTES
[address] [facility], [address] [facility]  [phone]           Patient: Gregory Aldridge                MRN: [id number]       SSN: [id number]  YOB: 1955        AGE: 75 y.o. SEX: male  Body mass index is 33 kg/m². PCP: Marely Farooq MD  09/19/19      This office note has been dictated. REVIEW OF SYSTEMS:  Constitutional: Negative for fever, chills, weight loss and malaise/fatigue. HENT: Negative. Eyes: Negative. Respiratory: Negative. Cardiovascular: Negative. Gastrointestinal: No bowel incontinence or constipation. Genitourinary: No bladder incontinence or saddle anesthesia. Skin: Negative. Neurological: Negative. Endo/Heme/Allergies: Negative. Psychiatric/Behavioral: Negative. Musculoskeletal: As per HPI above.      Past Medical History:   Diagnosis Date    Arthritis     Bleeding     Chronic pain     knee and shoulder    GERD (gastroesophageal reflux disease)     Headache(784.0)     migraine    High cholesterol     Hypertension     Lower back pain 11/6/2010    Other chest pain     Pure hypercholesterolemia     Right buttock pain 11/6/2010    Right foot pain     Rotator cuff tear     left-since 2010, worsened tear Young.    Rotator cuff tear, right     Spinal stenosis     Tendonitis, tibialis     anterior    Thromboembolus (HCC)     3 after sx last one 2000         Current Outpatient Medications:     warfarin (COUMADIN) 5 mg tablet, TAKE 2 AND 1/2 TABLETS BY MOUTH DAILY OR AS DIRECTED, Disp: 75 Tab, Rfl: 0    butalbital-acetaminophen-caff (FIORICET) -40 mg per capsule, Take 1 Cap by mouth every eight (8) hours as needed for Pain., Disp: 90 Cap, Rfl: 1    labetalol (NORMODYNE) 100 mg tablet, TAKE ONE TABLET BY MOUTH TWICE DAILY (Stop Verapamil), Disp: 180 Tab, Rfl: 0    nystatin (MYCOSTATIN) topical cream, APPLY EXTERNALLY TO THE AFFECTED AREA TWICE DAILY, Disp: 15 g, Rfl: 0   montelukast (SINGULAIR) 10 mg tablet, TAKE 1 TABLET BY MOUTH EVERY DAY, Disp: 90 Tab, Rfl: 0    dexAMETHasone (DECADRON) 0.5 mg tablet, 3 tab daily x 2 days, 2 tab day x 2 days, 1 tab daily x 2 days, 1/2 tab daily x 2 days, Disp: 14 Tab, Rfl: 0    warfarin (COUMADIN) 3 mg tablet, TAKE AS DIRECTED PER MD, Disp: 30 Tab, Rfl: 0    lisinopril-hydroCHLOROthiazide (PRINZIDE, ZESTORETIC) 20-12.5 mg per tablet, TAKE 1 TABLET BY MOUTH DAILY, Disp: 90 Tab, Rfl: 0    lansoprazole (PREVACID) 30 mg capsule, TAKE 1 CAPSULE DAILY BEFOREBREAKFAST, Disp: 90 Cap, Rfl: 3    diclofenac (VOLTAREN) 1 % gel, Apply 4 g to affected area four (4) times daily. Maximum 16 grams per joint per day. Dispense 5 100 gram tubes, Disp: 5 Each, Rfl: 0    metaxalone (SKELAXIN) 800 mg tablet, Take 1 Tab by mouth three (3) times daily.  Indications: muscle spasm, Disp: 90 Tab, Rfl: 2    raNITIdine (ZANTAC) 150 mg tablet, TK 1 T PO HS, Disp: , Rfl: 1    ALPRAZolam (XANAX) 0.5 mg tablet, Take one half(1/2) tab to one(1) tab by mouth at bedtime as needed for sleep, Disp: 30 Tab, Rfl: 0    warfarin (COUMADIN) 5 mg tablet, TAKE 2 AND 1/2 TABLETS BY MOUTH DAILY OR AS DIRECTED, Disp: 75 Tab, Rfl: 0    montelukast (SINGULAIR) 10 mg tablet, TAKE 1 TABLET BY MOUTH EVERY DAY (Patient taking differently: TAKE 1 TABLET BY MOUTH EVERY HS), Disp: 90 Tab, Rfl: 0    acetaminophen (TYLENOL) 500 mg tablet, Take 1,000 mg by mouth every six (6) hours as needed for Pain., Disp: , Rfl:     verapamil (CALAN) 120 mg tablet, 1 tab twice per day (Stop Labetalol), Disp: 60 Tab, Rfl: 2    Allergies   Allergen Reactions    Amoxicillin Itching    Augmentin [Amoxicillin-Pot Clavulanate] Itching    Chlorhexidine Towelette Itching    Hibiclens [Chlorhexidine Gluconate] Itching    Milk Containing Products Diarrhea    Penicillins Rash    Requip [Ropinirole] Nausea and Vomiting       Social History     Socioeconomic History    Marital status:      Spouse name: Not on file    Number of children: Not on file    Years of education: Not on file    Highest education level: Not on file   Occupational History    Not on file   Social Needs    Financial resource strain: Not on file    Food insecurity:     Worry: Not on file     Inability: Not on file    Transportation needs:     Medical: Not on file     Non-medical: Not on file   Tobacco Use    Smoking status: Never Smoker    Smokeless tobacco: Never Used   Substance and Sexual Activity    Alcohol use: No    Drug use: No    Sexual activity: Not Currently   Lifestyle    Physical activity:     Days per week: Not on file     Minutes per session: Not on file    Stress: Not on file   Relationships    Social connections:     Talks on phone: Not on file     Gets together: Not on file     Attends Advent service: Not on file     Active member of club or organization: Not on file     Attends meetings of clubs or organizations: Not on file     Relationship status: Not on file    Intimate partner violence:     Fear of current or ex partner: Not on file     Emotionally abused: Not on file     Physically abused: Not on file     Forced sexual activity: Not on file   Other Topics Concern     Service Not Asked    Blood Transfusions Not Asked    Caffeine Concern Not Asked    Occupational Exposure Not Asked   Kolleen Hoit Hazards Not Asked    Sleep Concern Not Asked    Stress Concern Not Asked    Weight Concern Not Asked    Special Diet Not Asked    Back Care Not Asked    Exercise Not Asked    Bike Helmet Not Asked   [address] Not Asked    Self-Exams Not Asked   Social History Narrative    Not on file       Past Surgical History:   Procedure Laterality Date    FOOT/TOES SURGERY PROC UNLISTED      HX BACK SURGERY      HX HEENT Right 09/2018    eye surgery, macular     HX KNEE REPLACEMENT Left     HX ORTHOPAEDIC  06-25-12    Right foot with excision of bursa and adipose tissue from fifth metatarsal base by  Uriel    HX OTHER SURGICAL      lower back (1992 and 2000)    HX OTHER SURGICAL      left foot (2008)    HX OTHER SURGICAL      Retina repair     HX PROSTATECTOMY  11/2018    HX ROTATOR CUFF REPAIR Right 01/28/2019    by Dr. Ethan Hardy           We did see Mr. Albrecht Stable for followup with regards to his left knee replacement. The patient is now about eight months status post surgery, and overall, he is doing pretty well. He does get some stiffness after being seated for a while. It feels like his knee wants to buckle on him a little bit. He does continue a home exercise program usually every other day. He is back to work and things are going pretty well for him. There is no real pain associated with the knee, just a little bit of stiffness at the time. There has been no recent fever, chills, systemic changes, or injuries to report. PHYSICAL EXAMINATION:  In general, the patient is alert and oriented x 3 in no acute distress. The patient is well-developed, well-nourished, with a normal affect. The patient is afebrile. HEENT:  Head is normocephalic and atraumatic. Pupils are equally round and reactive to light and accommodation. Extraocular eye movements are intact. Neck is supple. Trachea is midline. No JVD is present. Breathing is nonlabored. Examination of the lower extremities reveals pain-free range of motion of the hips. There is no pain to palpation of the greater trochanteric bursae. There is negative straight leg raise. There is negative calf tenderness. There is negative Giorgio's. There is no evidence of DVT present. The left knee reveals the skin is intact. There is no erythema, ecchymosis, and no warmth or signs of infection or cellulitis present. Range of motion is full. There is very good stability. The patella tracks nicely without rubs or crepitus noted.   She does have a little tenderness to palpation to the IT band laterally, as well as the pes bursa medially. ASSESSMENT:      1. Status post left knee replacement. 2. Synovitis left knee. 3. IT band tenderness left knee. 4. Pes bursitis left knee. PLAN:  At this point, the patient is instructed to continue working on strengthening activities. It is not uncommon to have stiffness after being seated for a while. He will continue with his anti-inflammatories as tolerated. We are going to try him with a low-profile, hinged, neoprene brace to see if this helps with his stability when he is ambulating. We will see him back in the office in about three months' time for evaluation.                     JR Willie GATES, PA-C, ATC

## 2019-09-19 NOTE — PROGRESS NOTES
1. Have you been to the ER, urgent care clinic since your last visit? Hospitalized since your last visit? No    2. Have you seen or consulted any other health care providers outside of the [address] [place] [address] since your last visit? Include any pap smears or colon screening.  No

## 2019-09-23 ENCOUNTER — OFFICE VISIT (OUTPATIENT)
Dept: FAMILY MEDICINE CLINIC | Facility: CLINIC | Age: 64
End: 2019-09-23

## 2019-09-23 ENCOUNTER — HOSPITAL ENCOUNTER (OUTPATIENT)
Dept: LAB | Age: 64
Discharge: HOME | End: 2019-09-23
Payer: MEDICARE

## 2019-09-23 DIAGNOSIS — Z86.718 PERSONAL HISTORY OF VENOUS THROMBOSIS AND EMBOLISM: ICD-10-CM

## 2019-09-23 DIAGNOSIS — F51.01 PRIMARY INSOMNIA: ICD-10-CM

## 2019-09-23 DIAGNOSIS — I10 ESSENTIAL HYPERTENSION: ICD-10-CM

## 2019-09-23 DIAGNOSIS — Z86.718 PERSONAL HISTORY OF VENOUS THROMBOSIS AND EMBOLISM: Primary | ICD-10-CM

## 2019-09-23 LAB
INR PPP: 1.4 (ref 0.8–1.2)
PROTHROMBIN TIME: 16.5 SEC (ref 11.5–15.2)

## 2019-09-23 PROCEDURE — 36415 COLL VENOUS BLD VENIPUNCTURE: CPT

## 2019-09-23 PROCEDURE — 85610 PROTHROMBIN TIME: CPT

## 2019-09-23 RX ORDER — BUTALBITAL, ACETAMINOPHEN AND CAFFEINE 300; 40; 50 MG/1; MG/1; MG/1
1 CAPSULE ORAL
Qty: 30 CAP | Refills: 0 | Status: SHIPPED | OUTPATIENT
Start: 2019-09-23 | End: 2019-12-05 | Stop reason: SDUPTHER

## 2019-09-23 RX ORDER — ALPRAZOLAM 0.5 MG/1
TABLET ORAL
Qty: 30 TAB | Refills: 0 | Status: SHIPPED | OUTPATIENT
Start: 2019-09-23

## 2019-09-23 RX ORDER — LABETALOL 100 MG/1
TABLET, FILM COATED ORAL
Qty: 180 TAB | Refills: 0 | Status: SHIPPED | OUTPATIENT
Start: 2019-09-23 | End: 2019-12-26

## 2019-09-26 ENCOUNTER — DOCUMENTATION ONLY (OUTPATIENT)
Dept: ORTHOPEDIC SURGERY | Age: 64
End: 2019-09-26

## 2019-09-26 NOTE — PROGRESS NOTES
Records Request from [facility] was fulfilled and faxed back to sender 9-26-19.

## 2019-09-27 ENCOUNTER — TELEPHONE (OUTPATIENT)
Dept: FAMILY MEDICINE CLINIC | Facility: CLINIC | Age: 64
End: 2019-09-27

## 2019-09-27 RX ORDER — AZITHROMYCIN 250 MG/1
TABLET, FILM COATED ORAL
Qty: 6 TAB | Refills: 0 | Status: SHIPPED | OUTPATIENT
Start: 2019-09-27 | End: 2019-10-02

## 2019-09-29 VITALS
HEIGHT: 70 IN | SYSTOLIC BLOOD PRESSURE: 132 MMHG | BODY MASS INDEX: 31.78 KG/M2 | HEART RATE: 78 BPM | WEIGHT: 222 LBS | DIASTOLIC BLOOD PRESSURE: 78 MMHG

## 2019-09-29 NOTE — PROGRESS NOTES
The patient presents to the office today with the chief complaint of chronic DVT    HPI    The patient has chronic DVT. He remains on Coumadin. His  protimes have been doing well. The patient is being scheduled for an endoscopy by GI. The Coumadin may need to be adjusted for the procedure. The patient remains on Lisinopril HCT and Labetalol for hypertension. He is doing well on the medications. Review of Systems   Respiratory: Negative for shortness of breath. Cardiovascular: Positive for leg swelling (Mild). Negative for chest pain. Neurological: Positive for headaches. Allergies   Allergen Reactions    Amoxicillin Itching    Augmentin [Amoxicillin-Pot Clavulanate] Itching    Chlorhexidine Towelette Itching    Hibiclens [Chlorhexidine Gluconate] Itching    Milk Containing Products Diarrhea    Penicillins Rash    Requip [Ropinirole] Nausea and Vomiting       Current Outpatient Medications   Medication Sig Dispense Refill    butalbital-acetaminophen-caff (FIORICET) -40 mg per capsule Take 1 Cap by mouth every eight (8) hours as needed for Pain.  30 Cap 0    labetalol (NORMODYNE) 100 mg tablet TAKE ONE TABLET BY MOUTH TWICE DAILY (Stop Verapamil) 180 Tab 0    ALPRAZolam (XANAX) 0.5 mg tablet Take one half(1/2) tab to one(1) tab by mouth at bedtime as needed for sleep 30 Tab 0    warfarin (COUMADIN) 5 mg tablet TAKE 2 AND 1/2 TABLETS BY MOUTH DAILY OR AS DIRECTED 75 Tab 0    nystatin (MYCOSTATIN) topical cream APPLY EXTERNALLY TO THE AFFECTED AREA TWICE DAILY 15 g 0    verapamil (CALAN) 120 mg tablet 1 tab twice per day (Stop Labetalol) 60 Tab 2    dexAMETHasone (DECADRON) 0.5 mg tablet 3 tab daily x 2 days, 2 tab day x 2 days, 1 tab daily x 2 days, 1/2 tab daily x 2 days 14 Tab 0    lisinopril-hydroCHLOROthiazide (PRINZIDE, ZESTORETIC) 20-12.5 mg per tablet TAKE 1 TABLET BY MOUTH DAILY 90 Tab 0    lansoprazole (PREVACID) 30 mg capsule TAKE 1 CAPSULE DAILY BEFOREBREAKFAST 90 Cap 3    diclofenac (VOLTAREN) 1 % gel Apply 4 g to affected area four (4) times daily. Maximum 16 grams per joint per day. Dispense 5 100 gram tubes 5 Each 0    metaxalone (SKELAXIN) 800 mg tablet Take 1 Tab by mouth three (3) times daily. Indications: muscle spasm 90 Tab 2    raNITIdine (ZANTAC) 150 mg tablet TK 1 T PO HS  1    montelukast (SINGULAIR) 10 mg tablet TAKE 1 TABLET BY MOUTH EVERY DAY (Patient taking differently: TAKE 1 TABLET BY MOUTH EVERY HS) 90 Tab 0    acetaminophen (TYLENOL) 500 mg tablet Take 1,000 mg by mouth every six (6) hours as needed for Pain.       azithromycin (ZITHROMAX) 250 mg tablet Take 2 tablets today, then take 1 tablet daily 6 Tab 0    warfarin (COUMADIN) 3 mg tablet TAKE AS DIRECTED PER MD 30 Tab 0       Past Medical History:   Diagnosis Date    Arthritis     Bleeding     Chronic pain     knee and shoulder    GERD (gastroesophageal reflux disease)     Headache(784.0)     migraine    High cholesterol     Hypertension     Lower back pain 11/6/2010    Other chest pain     Pure hypercholesterolemia     Right buttock pain 11/6/2010    Right foot pain     Rotator cuff tear     left-since 2010, worsened tear Jan.    Rotator cuff tear, right     Spinal stenosis     Tendonitis, tibialis     anterior    Thromboembolus ([facility] [address]     3 after sx last one 2000       Past Surgical History:   Procedure Laterality Date    FOOT/TOES SURGERY PROC UNLISTED      HX BACK SURGERY      HX HEENT Right 09/2018    eye surgery, macular     HX KNEE REPLACEMENT Left     HX ORTHOPAEDIC  06-25-12    Right foot with excision of bursa and adipose tissue from fifth metatarsal base by Dr. Ngo Civil      lower back (1992 and 2000)    HX OTHER SURGICAL      left foot (2008)    HX OTHER SURGICAL      Retina repair     HX PROSTATECTOMY  11/2018    HX ROTATOR CUFF REPAIR Right 01/28/2019    by Dr. Luna Richards History Socioeconomic History    Marital status:      Spouse name: Not on file    Number of children: Not on file    Years of education: Not on file    Highest education level: Not on file   Occupational History    Not on file   Social Needs    Financial resource strain: Not on file    Food insecurity:     Worry: Not on file     Inability: Not on file    Transportation needs:     Medical: Not on file     Non-medical: Not on file   Tobacco Use    Smoking status: Never Smoker    Smokeless tobacco: Never Used   Substance and Sexual Activity    Alcohol use: No    Drug use: No    Sexual activity: Not Currently   Lifestyle    Physical activity:     Days per week: Not on file     Minutes per session: Not on file    Stress: Not on file   Relationships    Social connections:     Talks on phone: Not on file     Gets together: Not on file     Attends Sikh service: Not on file     Active member of club or organization: Not on file     Attends meetings of clubs or organizations: Not on file     Relationship status: Not on file    Intimate partner violence:     Fear of current or ex partner: Not on file     Emotionally abused: Not on file     Physically abused: Not on file     Forced sexual activity: Not on file   Other Topics Concern     Service Not Asked    Blood Transfusions Not Asked    Caffeine Concern Not Asked    Occupational Exposure Not Asked   Cely Puller Hazards Not Asked    Sleep Concern Not Asked    Stress Concern Not Asked    Weight Concern Not Asked    Special Diet Not Asked    Back Care Not Asked    Exercise Not Asked    Bike Helmet Not Asked   [address] Not Asked    Self-Exams Not Asked   Social History Narrative    Not on file       Patient does not have an advanced directive on file    Visit Vitals  /78   Pulse 78   Ht 5' 10\" (1.778 m)   Wt 222 lb (100.7 kg)   BMI 31.85 kg/m²       Physical Exam  No Cervical Lymphadenopathy  No Supraclavicular Lymphadenopathy  Thyroid is Normal  Lungs are normal to percussion. Clear to auscultation   Heart:  S1 S2 are normal, No gallops, No murmurs  No Carotid Bruits  Abdomen:  Normal Bowel Sounds. No tenderness. No masses. No Hepatomegaly or Splenomegaly  LE:  Strong Pedal Pulses. 1+ bilateral Edema      BMI:  The patient was advised to limit calories to 2000 macey and carbohydrates to 100 grams daily      Hospital Outpatient Visit on 09/23/2019   Component Date Value Ref Range Status    Prothrombin time 09/23/2019 16.5* 11.5 - 15.2 sec Final    INR 09/23/2019 1.4* 0.8 - 1.2   Final    Comment:            INR Therapeutic Ranges         (on stable oral anticoagulant):     INDICATION                INR  DVT/PE/Atrial Fib          2.0-3.0  MI/Mechanical Heart Valve  2.5-3.5     Office Visit on 08/06/2019   Component Date Value Ref Range Status    VALID INTERNAL CONTROL POC 08/06/2019 Yes   Final    INR POC 08/06/2019 1.6   Final   Clinical Support on 07/22/2019   Component Date Value Ref Range Status    Prostate Specific Ag 07/22/2019 <0.03   0 - 4 ng/mL Final    Comment: (Methodology: Roche ECLIA)         Clinical Support on 07/08/2019   Component Date Value Ref Range Status    VALID INTERNAL CONTROL POC 07/08/2019 Yes   Final    INR POC 07/08/2019 1.9   Final       .  Results for orders placed or performed during the hospital encounter of 09/23/19   PROTHROMBIN TIME + INR   Result Value Ref Range    Prothrombin time 16.5 (H) 11.5 - 15.2 sec    INR 1.4 (H) 0.8 - 1.2         Assessment / Plan      ICD-10-CM ICD-9-CM    1. Personal history of venous thrombosis and embolism Z86.718 V12.51 PROTHROMBIN TIME + INR   2. Primary insomnia F51.01 307.42 ALPRAZolam (XANAX) 0.5 mg tablet   3. Essential hypertension I10 401.9        he was advised to continue his maintenance medications      Follow-up and Dispositions    · Return in about 3 months (around 12/23/2019).          I asked Sunitha Myers if he has any questions and I answered the questions. Yordy Lloyd states that he understands the treatment plan and agrees with the treatment plan          THIS DOCUMENT  [facility].   IT MAY CONTAIN TRANSCRIPTION ERRORS

## 2019-10-08 ENCOUNTER — TELEPHONE (OUTPATIENT)
Dept: FAMILY MEDICINE CLINIC | Facility: CLINIC | Age: 64
End: 2019-10-08

## 2019-10-08 NOTE — TELEPHONE ENCOUNTER
Patient's wife states he has not felt well and his blood pressure has been elevated today. Requested appointment for Wednesday with Dr. Jessica Owen, none were available, offered appointment with Dr. Chris Benítez, declined appointment. Will go to ER if worsens, wanted Dr. Jessica Owen to be aware.

## 2019-10-10 RX ORDER — BENZONATATE 200 MG/1
CAPSULE ORAL
Qty: 30 CAP | Refills: 1 | Status: SHIPPED | OUTPATIENT
Start: 2019-10-10 | End: 2019-10-22 | Stop reason: SDUPTHER

## 2019-10-10 RX ORDER — FAMOTIDINE 20 MG/1
TABLET, FILM COATED ORAL
Qty: 60 TAB | Refills: 5 | Status: SHIPPED | OUTPATIENT
Start: 2019-10-10 | End: 2020-03-01

## 2019-10-14 RX ORDER — LISINOPRIL AND HYDROCHLOROTHIAZIDE 12.5; 2 MG/1; MG/1
TABLET ORAL
Qty: 90 TAB | Refills: 0 | Status: SHIPPED | OUTPATIENT
Start: 2019-10-14 | End: 2020-01-13 | Stop reason: SDUPTHER

## 2019-10-15 ENCOUNTER — TELEPHONE (OUTPATIENT)
Dept: FAMILY MEDICINE CLINIC | Facility: CLINIC | Age: 64
End: 2019-10-15

## 2019-10-16 ENCOUNTER — OFFICE VISIT (OUTPATIENT)
Dept: FAMILY MEDICINE CLINIC | Facility: CLINIC | Age: 64
End: 2019-10-16

## 2019-10-16 VITALS
BODY MASS INDEX: 33.21 KG/M2 | HEIGHT: 70 IN | HEART RATE: 85 BPM | SYSTOLIC BLOOD PRESSURE: 149 MMHG | DIASTOLIC BLOOD PRESSURE: 82 MMHG | TEMPERATURE: 98 F | WEIGHT: 232 LBS | RESPIRATION RATE: 12 BRPM | OXYGEN SATURATION: 95 %

## 2019-10-16 DIAGNOSIS — R33.9 PAIN DUE TO RETENTION OF URINE: Primary | ICD-10-CM

## 2019-10-16 DIAGNOSIS — R52 PAIN DUE TO RETENTION OF URINE: Primary | ICD-10-CM

## 2019-10-16 LAB
BILIRUB UR QL STRIP: NEGATIVE
GLUCOSE UR-MCNC: NEGATIVE MG/DL
KETONES P FAST UR STRIP-MCNC: NEGATIVE MG/DL
PH UR STRIP: 6 [PH] (ref 4.6–8)
PROT UR QL STRIP: NEGATIVE
SP GR UR STRIP: 1.01 (ref 1–1.03)
UA UROBILINOGEN AMB POC: NORMAL (ref 0.2–1)
URINALYSIS CLARITY POC: CLEAR
URINALYSIS COLOR POC: YELLOW
URINE BLOOD POC: NORMAL
URINE LEUKOCYTES POC: NEGATIVE
URINE NITRITES POC: NEGATIVE

## 2019-10-16 RX ORDER — FLUCONAZOLE 100 MG/1
TABLET ORAL
Qty: 7 TAB | Refills: 0 | Status: SHIPPED | OUTPATIENT
Start: 2019-10-16 | End: 2019-10-22

## 2019-10-16 NOTE — PROGRESS NOTES
Juwan Marrero is a 75 y.o. male presenting today for Urinary Pain (started 10/15/19)  . HPI:  Juwan Marrero presents to the office today for urinary symptoms of frequency. He notes he started urinary symptoms on October 15, 2019 and he is concerned he may have acute cystitis. He has mild dysuria with urinating. He notes he has upcoming appointment with urologist but wanted to get medication before the symptoms get out of control. Review of Systems   HENT: Positive for congestion. Respiratory: Negative for cough and sputum production. Cardiovascular: Negative for chest pain, palpitations and leg swelling. Genitourinary: Positive for dysuria. Allergies   Allergen Reactions    Amoxicillin Itching    Augmentin [Amoxicillin-Pot Clavulanate] Itching    Chlorhexidine Towelette Itching    Hibiclens [Chlorhexidine Gluconate] Itching    Milk Containing Products Diarrhea    Penicillins Rash    Requip [Ropinirole] Nausea and Vomiting       Current Outpatient Medications   Medication Sig Dispense Refill    lisinopril-hydroCHLOROthiazide (PRINZIDE, ZESTORETIC) 20-12.5 mg per tablet TAKE 1 TABLET BY MOUTH DAILY 90 Tab 0    famotidine (PEPCID) 20 mg tablet 1 tab twice per day 60 Tab 5    butalbital-acetaminophen-caff (FIORICET) -40 mg per capsule Take 1 Cap by mouth every eight (8) hours as needed for Pain. 30 Cap 0    labetalol (NORMODYNE) 100 mg tablet TAKE ONE TABLET BY MOUTH TWICE DAILY (Stop Verapamil) 180 Tab 0    ALPRAZolam (XANAX) 0.5 mg tablet Take one half(1/2) tab to one(1) tab by mouth at bedtime as needed for sleep 30 Tab 0    warfarin (COUMADIN) 5 mg tablet TAKE 2 AND 1/2 TABLETS BY MOUTH DAILY OR AS DIRECTED 75 Tab 0    nystatin (MYCOSTATIN) topical cream APPLY EXTERNALLY TO THE AFFECTED AREA TWICE DAILY 15 g 0    warfarin (COUMADIN) 3 mg tablet TAKE AS DIRECTED PER MD 30 Tab 0    diclofenac (VOLTAREN) 1 % gel Apply 4 g to affected area four (4) times daily. Maximum 16 grams per joint per day. Dispense 5 100 gram tubes 5 Each 0    metaxalone (SKELAXIN) 800 mg tablet Take 1 Tab by mouth three (3) times daily. Indications: muscle spasm 90 Tab 2    montelukast (SINGULAIR) 10 mg tablet TAKE 1 TABLET BY MOUTH EVERY DAY (Patient taking differently: TAKE 1 TABLET BY MOUTH EVERY HS) 90 Tab 0    acetaminophen (TYLENOL) 500 mg tablet Take 1,000 mg by mouth every six (6) hours as needed for Pain.       lansoprazole (PREVACID) 30 mg capsule 1 cap each AM 90 Cap 3    clarithromycin (BIAXIN) 250 mg tablet 1 tab twice per day 14 Tab 0    benzonatate (TESSALON) 200 mg capsule 1 cap three times per day as needed for cough 30 Cap 1    predniSONE (DELTASONE) 20 mg tablet 3 tabs daily x 3 days, 2 tabs daily x 3 days, 1 tab daily x 3 days, 1/2 tab daily x 3 days 20 Tab 0    fluconazole (DIFLUCAN) 100 mg tablet 1 tab daily (do not take until finished with Biaxin) 7 Tab 0       Past Medical History:   Diagnosis Date    Arthritis     Bleeding     Chronic pain     knee and shoulder    GERD (gastroesophageal reflux disease)     Headache(784.0)     migraine    High cholesterol     Hypertension     Lower back pain 11/6/2010    Other chest pain     Pure hypercholesterolemia     Right buttock pain 11/6/2010    Right foot pain     Rotator cuff tear     left-since 2010, worsened tear Young.    Rotator cuff tear, right     Spinal stenosis     Tendonitis, tibialis     anterior    Thromboembolus ([address]     3 after sx last one 2000       Past Surgical History:   Procedure Laterality Date    FOOT/TOES SURGERY PROC UNLISTED      HX BACK SURGERY      HX HEENT Right 09/2018    eye surgery, macular     HX KNEE REPLACEMENT Left     HX ORTHOPAEDIC  06-25-12    Right foot with excision of bursa and adipose tissue from fifth metatarsal base by Dr. Viktoriya Oreilly      lower back (1992 and 2000)    HX OTHER SURGICAL      left foot (2008)    HX OTHER SURGICAL      Retina repair     HX PROSTATECTOMY  11/2018    HX ROTATOR CUFF REPAIR Right 01/28/2019    by Dr. Anurag Hernandez History     Socioeconomic History    Marital status:      Spouse name: Not on file    Number of children: Not on file    Years of education: Not on file    Highest education level: Not on file   Occupational History    Not on file   Social Needs    Financial resource strain: Not on file    Food insecurity:     Worry: Not on file     Inability: Not on file    Transportation needs:     Medical: Not on file     Non-medical: Not on file   Tobacco Use    Smoking status: Never Smoker    Smokeless tobacco: Never Used   Substance and Sexual Activity    Alcohol use: No    Drug use: No    Sexual activity: Not Currently   Lifestyle    Physical activity:     Days per week: Not on file     Minutes per session: Not on file    Stress: Not on file   Relationships    Social connections:     Talks on phone: Not on file     Gets together: Not on file     Attends Congregational service: Not on file     Active member of club or organization: Not on file     Attends meetings of clubs or organizations: Not on file     Relationship status: Not on file    Intimate partner violence:     Fear of current or ex partner: Not on file     Emotionally abused: Not on file     Physically abused: Not on file     Forced sexual activity: Not on file   Other Topics Concern     Service Not Asked    Blood Transfusions Not Asked    Caffeine Concern Not Asked    Occupational Exposure Not Asked   Rosalene Bin Hazards Not Asked    Sleep Concern Not Asked    Stress Concern Not Asked    Weight Concern Not Asked    Special Diet Not Asked    Back Care Not Asked    Exercise Not Asked    Bike Helmet Not Asked    Seat Belt Not Asked    Self-Exams Not Asked   Social History Narrative    Not on file       Patient does not have an advanced directive on file    Vitals:    10/16/19 1616 BP: 149/82   Pulse: 85   Resp: 12   Temp: 98 °F (36.7 °C)   TempSrc: Oral   SpO2: 95%   Weight: 232 lb (105.2 kg)   Height: 5' 10\" (1.778 m)   PainSc:   0 - No pain       Physical Exam   Constitutional: He is oriented to person, place, and time. No distress. Cardiovascular: Normal rate, regular rhythm and normal heart sounds. Pulmonary/Chest: Effort normal and breath sounds normal. No respiratory distress. Neurological: He is alert and oriented to person, place, and time. Nursing note and vitals reviewed.       Office Visit on 10/16/2019   Component Date Value Ref Range Status    Color (UA POC) 10/16/2019 Yellow   Final    Clarity (UA POC) 10/16/2019 Clear   Final    Glucose (UA POC) 10/16/2019 Negative  Negative Final    Bilirubin (UA POC) 10/16/2019 Negative  Negative Final    Ketones (UA POC) 10/16/2019 Negative  Negative Final    Specific gravity (UA POC) 10/16/2019 1.015  1.001 - 1.035 Final    Blood (UA POC) 10/16/2019 Trace  Negative Final    pH (UA POC) 10/16/2019 6.0  4.6 - 8.0 Final    Protein (UA POC) 10/16/2019 Negative  Negative Final    Urobilinogen (UA POC) 10/16/2019 0.2 mg/dL  0.2 - 1 Final    Nitrites (UA POC) 10/16/2019 Negative  Negative Final    Leukocyte esterase (UA POC) 10/16/2019 Negative  Negative Final   Office Visit on 10/02/2019   Component Date Value Ref Range Status    Prostate Specific Ag 10/02/2019 <0.03   0 - 4 ng/mL Final    Comment: (Methodology: Roche ECLIA)          Glucose (UA POC) 10/02/2019 Negative  Negative Final    Bilirubin (UA POC) 10/02/2019 Negative  Negative Final    Ketones (UA POC) 10/02/2019 Negative  Negative Final    Specific gravity (UA POC) 10/02/2019 1.015  1.001 - 1.035 Final    Blood (UA POC) 10/02/2019 Trace  Negative Final    pH (UA POC) 10/02/2019 5.0  4.6 - 8.0 Final    Protein (UA POC) 10/02/2019 Negative  Negative Final    Urobilinogen (UA POC) 10/02/2019 0.2 mg/dL  0.2 - 1 Final    Nitrites (UA POC) 10/02/2019 Negative Negative Final    Leukocyte esterase (UA POC) 10/02/2019 Negative  Negative Final   Hospital Outpatient Visit on 09/23/2019   Component Date Value Ref Range Status    Prothrombin time 09/23/2019 16.5* 11.5 - 15.2 sec Final    INR 09/23/2019 1.4* 0.8 - 1.2   Final    Comment:            INR Therapeutic Ranges         (on stable oral anticoagulant):     INDICATION                INR  DVT/PE/Atrial Fib          2.0-3.0  MI/Mechanical Heart Valve  2.5-3.5     Office Visit on 08/06/2019   Component Date Value Ref Range Status    VALID INTERNAL CONTROL POC 08/06/2019 Yes   Final    INR POC 08/06/2019 1.6   Final   Clinical Support on 07/22/2019   Component Date Value Ref Range Status    Prostate Specific Ag 07/22/2019 <0.03   0 - 4 ng/mL Final    Comment: (Methodology: Roche ECLIA)             . Results for orders placed or performed in visit on 10/16/19   AMB POC URINALYSIS DIP STICK AUTO W/O MICRO   Result Value Ref Range    Color (UA POC) Yellow     Clarity (UA POC) Clear     Glucose (UA POC) Negative Negative    Bilirubin (UA POC) Negative Negative    Ketones (UA POC) Negative Negative    Specific gravity (UA POC) 1.015 1.001 - 1.035    Blood (UA POC) Trace Negative    pH (UA POC) 6.0 4.6 - 8.0    Protein (UA POC) Negative Negative    Urobilinogen (UA POC) 0.2 mg/dL 0.2 - 1    Nitrites (UA POC) Negative Negative    Leukocyte esterase (UA POC) Negative Negative       Assessment / Plan:      ICD-10-CM ICD-9-CM    1. Pain due to retention of urine R33.9 788.20 AMB POC URINALYSIS DIP STICK AUTO W/O MICRO     Negative for acute cystitis  Increase fluid  Add Cranberry juice        Follow-up and Dispositions    · Return if symptoms worsen or fail to improve. I asked the patient if he  had any questions and answered his  questions.   The patient stated that he understands the treatment plan and agrees with the treatment plan    This document was created with a voice activated dictation system and may contain transcription errors.

## 2019-10-16 NOTE — PROGRESS NOTES
Visit Vitals  /82   Pulse 85   Temp 98 °F (36.7 °C) (Oral)   Resp 12   Ht 5' 10\" (1.778 m)   Wt 232 lb (105.2 kg)   SpO2 95%   BMI 33.29 kg/m²     Juwan Marrero presents today for   Chief Complaint   Patient presents with    Urinary Pain     started 10/15/19       Is someone accompanying this pt? no    Is the patient using any DME equipment during OV? no    Depression Screening:  3 most recent PHQ Screens 10/16/2019   PHQ Not Done -   Little interest or pleasure in doing things Not at all   Feeling down, depressed, irritable, or hopeless Not at all   Total Score PHQ 2 0       Learning Assessment:  Learning Assessment 2/8/2019   PRIMARY LEARNER Patient   HIGHEST LEVEL OF EDUCATION - PRIMARY LEARNER  -   [facility] -   [address] [place] [address]    NEED -   LEARNER PREFERENCE PRIMARY DEMONSTRATION   ANSWERED BY Patient   RELATIONSHIP SELF       Abuse Screening:  Abuse Screening Questionnaire 11/6/2018   Do you ever feel afraid of your partner? N   Are you in a relationship with someone who physically or mentally threatens you? N   Is it safe for you to go home? Y       Fall Risk  Fall Risk Assessment, last 12 mths 10/16/2019   Able to walk? Yes   Fall in past 12 months? No   Fall with injury? -   Number of falls in past 12 months -   Fall Risk Score -       Health Maintenance reviewed and discussed and ordered per Provider. Health Maintenance Due   Topic Date Due    Hepatitis C Screening  1955    Shingrix Vaccine Age 50> (1 of 2) 04/13/2003    GLAUCOMA SCREENING Q2Y  04/13/2018    Pneumococcal 65+ years (1 of 2 - PCV13) 04/13/2018    Influenza Age 5 to Adult  08/01/2019           Coordination of Care:  1. Have you been to the ER, urgent care clinic since your last visit? Hospitalized since your last visit? no    2. Have you seen or consulted any other health care providers outside of the [address] [place] [address] since your last visit?  Include any pap smears or colon screening.  no

## 2019-10-22 ENCOUNTER — OFFICE VISIT (OUTPATIENT)
Dept: FAMILY MEDICINE CLINIC | Facility: CLINIC | Age: 64
End: 2019-10-22

## 2019-10-22 VITALS
WEIGHT: 230 LBS | SYSTOLIC BLOOD PRESSURE: 139 MMHG | HEIGHT: 70 IN | BODY MASS INDEX: 32.93 KG/M2 | RESPIRATION RATE: 16 BRPM | TEMPERATURE: 97 F | HEART RATE: 67 BPM | DIASTOLIC BLOOD PRESSURE: 82 MMHG | OXYGEN SATURATION: 94 %

## 2019-10-22 DIAGNOSIS — J01.01 ACUTE RECURRENT MAXILLARY SINUSITIS: Primary | ICD-10-CM

## 2019-10-22 DIAGNOSIS — K21.9 GASTROESOPHAGEAL REFLUX DISEASE WITHOUT ESOPHAGITIS: ICD-10-CM

## 2019-10-22 RX ORDER — PREDNISONE 20 MG/1
TABLET ORAL
Qty: 20 TAB | Refills: 0 | Status: SHIPPED | OUTPATIENT
Start: 2019-10-22 | End: 2019-11-04 | Stop reason: ALTCHOICE

## 2019-10-22 RX ORDER — BENZONATATE 200 MG/1
CAPSULE ORAL
Qty: 30 CAP | Refills: 1 | Status: SHIPPED | OUTPATIENT
Start: 2019-10-22 | End: 2020-03-01

## 2019-10-22 RX ORDER — FLUCONAZOLE 100 MG/1
TABLET ORAL
Qty: 7 TAB | Refills: 0 | Status: SHIPPED | OUTPATIENT
Start: 2019-10-22 | End: 2020-01-16 | Stop reason: ALTCHOICE

## 2019-10-22 RX ORDER — CLARITHROMYCIN 250 MG/1
TABLET, FILM COATED ORAL
Qty: 14 TAB | Refills: 0 | Status: SHIPPED | OUTPATIENT
Start: 2019-10-22 | End: 2020-01-16 | Stop reason: ALTCHOICE

## 2019-10-22 RX ORDER — LANSOPRAZOLE 30 MG/1
CAPSULE, DELAYED RELEASE ORAL
Qty: 90 CAP | Refills: 3 | Status: SHIPPED | OUTPATIENT
Start: 2019-10-22 | End: 2020-03-01

## 2019-10-22 NOTE — PROGRESS NOTES
Chief Complaint   Patient presents with    Follow Up Chronic Condition     HTN GERD     Medication Refill

## 2019-10-25 ENCOUNTER — OFFICE VISIT (OUTPATIENT)
Dept: ORTHOPEDIC SURGERY | Age: 64
End: 2019-10-25

## 2019-10-25 ENCOUNTER — CLINICAL SUPPORT (OUTPATIENT)
Dept: FAMILY MEDICINE CLINIC | Facility: CLINIC | Age: 64
End: 2019-10-25

## 2019-10-25 VITALS
HEART RATE: 69 BPM | HEIGHT: 70 IN | TEMPERATURE: 96.9 F | WEIGHT: 231.4 LBS | BODY MASS INDEX: 33.13 KG/M2 | SYSTOLIC BLOOD PRESSURE: 133 MMHG | OXYGEN SATURATION: 93 % | RESPIRATION RATE: 16 BRPM | DIASTOLIC BLOOD PRESSURE: 69 MMHG

## 2019-10-25 DIAGNOSIS — Z79.01 WARFARIN ANTICOAGULATION: ICD-10-CM

## 2019-10-25 DIAGNOSIS — M70.61 TROCHANTERIC BURSITIS OF RIGHT HIP: ICD-10-CM

## 2019-10-25 DIAGNOSIS — R05.9 COUGH: Primary | ICD-10-CM

## 2019-10-25 DIAGNOSIS — M25.562 LEFT KNEE PAIN, UNSPECIFIED CHRONICITY: ICD-10-CM

## 2019-10-25 DIAGNOSIS — Z86.718 PERSONAL HISTORY OF VENOUS THROMBOSIS AND EMBOLISM: Primary | ICD-10-CM

## 2019-10-25 DIAGNOSIS — M70.52 PES ANSERINUS BURSITIS OF LEFT KNEE: ICD-10-CM

## 2019-10-25 DIAGNOSIS — Z96.652 HISTORY OF TOTAL LEFT KNEE REPLACEMENT (TKR): Primary | ICD-10-CM

## 2019-10-25 LAB
INR BLD: 8
PT POC: NORMAL
VALID INTERNAL CONTROL?: YES

## 2019-10-25 RX ORDER — CODEINE PHOSPHATE AND GUAIFENESIN 10; 100 MG/5ML; MG/5ML
10 SOLUTION ORAL
Qty: 240 ML | Refills: 0 | Status: SHIPPED | OUTPATIENT
Start: 2019-10-25 | End: 2019-11-08

## 2019-10-25 RX ORDER — DICLOFENAC SODIUM 10 MG/G
4 GEL TOPICAL 4 TIMES DAILY
Qty: 100 G | Refills: 4 | Status: SHIPPED | OUTPATIENT
Start: 2019-10-25 | End: 2020-03-01

## 2019-10-25 NOTE — PROGRESS NOTES
1. Have you been to the ER, urgent care clinic since your last visit? Hospitalized since your last visit? No    2. Have you seen or consulted any other health care providers outside of the [address] [place] [address] since your last visit? Include any pap smears or colon screening.  No

## 2019-10-25 NOTE — PROGRESS NOTES
[facility], [address] [facility]  [phone]           Patient: Yolanda Taylor                MRN: [id number]       SSN: [id number]  YOB: 1955        AGE: 75 y.o. SEX: male  Body mass index is 33.2 kg/m². PCP: Celeste Vaughan MD  10/25/19      This office note has been dictated. REVIEW OF SYSTEMS:  Constitutional: Negative for fever, chills, weight loss and malaise/fatigue. HENT: Negative. Eyes: Negative. Respiratory: Negative. Cardiovascular: Negative. Gastrointestinal: No bowel incontinence or constipation. Genitourinary: No bladder incontinence or saddle anesthesia. Skin: Negative. Neurological: Negative. Endo/Heme/Allergies: Negative. Psychiatric/Behavioral: Negative. Musculoskeletal: As per HPI above.      Past Medical History:   Diagnosis Date    Arthritis     Bleeding     Chronic pain     knee and shoulder    GERD (gastroesophageal reflux disease)     Headache(784.0)     migraine    High cholesterol     Hypertension     Lower back pain 11/6/2010    Other chest pain     Pure hypercholesterolemia     Right buttock pain 11/6/2010    Right foot pain     Rotator cuff tear     left-since 2010, worsened tear Young.    Rotator cuff tear, right     Spinal stenosis     Tendonitis, tibialis     anterior    Thromboembolus (HCC)     3 after sx last one 2000         Current Outpatient Medications:     lansoprazole (PREVACID) 30 mg capsule, 1 cap each AM, Disp: 90 Cap, Rfl: 3    clarithromycin (BIAXIN) 250 mg tablet, 1 tab twice per day, Disp: 14 Tab, Rfl: 0    benzonatate (TESSALON) 200 mg capsule, 1 cap three times per day as needed for cough, Disp: 30 Cap, Rfl: 1    predniSONE (DELTASONE) 20 mg tablet, 3 tabs daily x 3 days, 2 tabs daily x 3 days, 1 tab daily x 3 days, 1/2 tab daily x 3 days, Disp: 20 Tab, Rfl: 0    fluconazole (DIFLUCAN) 100 mg tablet, 1 tab daily (do not take until finished with Biaxin), Disp: 7 Tab, Rfl: 0    lisinopril-hydroCHLOROthiazide (PRINZIDE, ZESTORETIC) 20-12.5 mg per tablet, TAKE 1 TABLET BY MOUTH DAILY, Disp: 90 Tab, Rfl: 0    famotidine (PEPCID) 20 mg tablet, 1 tab twice per day, Disp: 60 Tab, Rfl: 5    butalbital-acetaminophen-caff (FIORICET) -40 mg per capsule, Take 1 Cap by mouth every eight (8) hours as needed for Pain., Disp: 30 Cap, Rfl: 0    labetalol (NORMODYNE) 100 mg tablet, TAKE ONE TABLET BY MOUTH TWICE DAILY (Stop Verapamil), Disp: 180 Tab, Rfl: 0    ALPRAZolam (XANAX) 0.5 mg tablet, Take one half(1/2) tab to one(1) tab by mouth at bedtime as needed for sleep, Disp: 30 Tab, Rfl: 0    warfarin (COUMADIN) 5 mg tablet, TAKE 2 AND 1/2 TABLETS BY MOUTH DAILY OR AS DIRECTED, Disp: 75 Tab, Rfl: 0    warfarin (COUMADIN) 3 mg tablet, TAKE AS DIRECTED PER MD, Disp: 30 Tab, Rfl: 0    diclofenac (VOLTAREN) 1 % gel, Apply 4 g to affected area four (4) times daily. Maximum 16 grams per joint per day.  Dispense 5 100 gram tubes, Disp: 5 Each, Rfl: 0    montelukast (SINGULAIR) 10 mg tablet, TAKE 1 TABLET BY MOUTH EVERY DAY (Patient taking differently: TAKE 1 TABLET BY MOUTH EVERY HS), Disp: 90 Tab, Rfl: 0    acetaminophen (TYLENOL) 500 mg tablet, Take 1,000 mg by mouth every six (6) hours as needed for Pain., Disp: , Rfl:     Allergies   Allergen Reactions    Amoxicillin Itching    Augmentin [Amoxicillin-Pot Clavulanate] Itching    Chlorhexidine Towelette Itching    Hibiclens [Chlorhexidine Gluconate] Itching    Milk Containing Products Diarrhea    Penicillins Rash    Requip [Ropinirole] Nausea and Vomiting       Social History     Socioeconomic History    Marital status:      Spouse name: Not on file    Number of children: Not on file    Years of education: Not on file    Highest education level: Not on file   Occupational History    Not on file   Social Needs    Financial resource strain: Not on file    Food insecurity: Worry: Not on file     Inability: Not on file    Transportation needs:     Medical: Not on file     Non-medical: Not on file   Tobacco Use    Smoking status: Never Smoker    Smokeless tobacco: Never Used   Substance and Sexual Activity    Alcohol use: No    Drug use: No    Sexual activity: Not Currently   Lifestyle    Physical activity:     Days per week: Not on file     Minutes per session: Not on file    Stress: Not on file   Relationships    Social connections:     Talks on phone: Not on file     Gets together: Not on file     Attends Sabianist service: Not on file     Active member of club or organization: Not on file     Attends meetings of clubs or organizations: Not on file     Relationship status: Not on file    Intimate partner violence:     Fear of current or ex partner: Not on file     Emotionally abused: Not on file     Physically abused: Not on file     Forced sexual activity: Not on file   Other Topics Concern     Service Not Asked    Blood Transfusions Not Asked    Caffeine Concern Not Asked    Occupational Exposure Not Asked   Colie Maizes Hazards Not Asked    Sleep Concern Not Asked    Stress Concern Not Asked    Weight Concern Not Asked    Special Diet Not Asked    Back Care Not Asked    Exercise Not Asked    Bike Helmet Not Asked   [address] Not Asked    Self-Exams Not Asked   Social History Narrative    Not on file       Past Surgical History:   Procedure Laterality Date    FOOT/TOES SURGERY PROC UNLISTED      HX BACK SURGERY      HX HEENT Right 09/2018    eye surgery, macular     HX KNEE REPLACEMENT Left     HX ORTHOPAEDIC  06-25-12    Right foot with excision of bursa and adipose tissue from fifth metatarsal base by Dr. Manfred Whitaker      lower back (1992 and 2000)    HX OTHER SURGICAL      left foot (2008)    HX OTHER SURGICAL      Retina repair     HX PROSTATECTOMY  11/2018    HX ROTATOR CUFF REPAIR Right 01/28/2019    by Dr. Kale Lawton LAMINOTOMY      NERVE BLOCK             We did see Mr. Comfort Tobar for followup with regards to mainly his left knee. He is about nine months status post left knee replacement. He has done well. He has been worked up for infection in the past.  Fortunately, this has been negative. He has a little IT band tendinitis and a little bursitis of his knee. He has been using Voltaren Gel, which helped. He has had some episodes where the knee wants to give way on him. He has been a little more vigorous in his physical therapy, which has helped as well. He is reporting a little laterally-based right hip discomfort worse when he coughs or sneezes. He has no groin pain and no back pain and no radiating pain down the lower extremity. There I no change in his bowel or bladder habits. PHYSICAL EXAMINATION:  In general, the patient is alert and oriented x 3 in no acute distress. The patient is well-developed, well-nourished, with a normal affect. The patient is afebrile. HEENT:  Head is normocephalic and atraumatic. Pupils are equally round and reactive to light and accommodation. Extraocular eye movements are intact. Neck is supple. Trachea is midline. No JVD is present. Breathing is nonlabored. Examination of the lower extremities reveals pain-free range of motion of the hips. He does have discomfort to palpation of the greater trochanteric bursae on the right side. There is negative straight leg raise. There is negative calf tenderness. There is negative Giorgio's. There is no evidence of DVT present. The left knee reveals the skin is intact. The surgical wound is healed nicely. There is no ecchymosis, no warmth, and no signs of infection or cellulitis present. He has full range of motion, very good stability, and the patella tracks nicely. He does have a little tenderness to palpation to the pes bursa on of the right knee. ASSESSMENT:      1. Right hip trochanteric bursitis.    2. Left knee replacement. 3. Pes bursitis left knee. PLAN:  At this point, the patient will continue Voltaren Gel, and we will send a refill to the pharmacy. He will continue activities as tolerated. We discussed treatment options for the bursitis of the hip and the knee. We can consider a cortisone injection if necessary. We will plan on seeing her back in three months' time for evaluation. We will obtain x-rays of each of his knees upon his return. He will call with any questions or concerns that shall arise.                     JR Willie GATES, PAChantelC, ATC

## 2019-10-25 NOTE — PROGRESS NOTES
[facility] [employer] [facility] [employer]  [address] [place] [address]  [phone] fax    Physical Therapy Discharge Summary    Patient name: Reji Winters Start of Care: 4/16/2019   Referral Ami Valencia MD MKK: [id number]/[id number]               Medical Diagnosis: Other specified postprocedural states [Z98.890]  Presence of left artificial knee joint [Z96.652]  Payor: [place] MEDICARE / Plan: VA MEDICARE PART A & B / Product Type: Medicare /  Onset Date:3/29/2019               Treatment Diagnosis: right shoulder pain and left knee pain   Prior Hospitalization: see medical history Provider#: [id number]   Medications: Verified on Patient summary List    Comorbidities: arthritis, high blood pressure, prostate ca s/p sx, left TKA   Prior Level of Function: 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 Status at last note/certification: progressing   Status at discharge: met    Goal:Patient will demonstrate good understanding of final HEP to continue strengthening independently when discharged from physical therapy. Status at last note/certification:reviewed   Status at discharge: met        ASSESSMENT/RECOMMENDATIONS: Patient making steady progress in therapy and has met all final goals. He has improved right shoulder flexion strength 4/5 and ER/IR to 4+/5. Patient demonstrates good understanding of final HEP to continue strengthening upon discharge. Skilled PT is no longer necessary, therefore pt will be discharged.      [x]Discontinue therapy: [x]Patient has reached or is progressing toward set goals      []Patient is non-compliant or has abdicated      []Due to lack of appreciable progress towards set goals    Leigh Keller, PT 10/25/2019 2:52 PM

## 2019-10-25 NOTE — PROGRESS NOTES
The patient presents to the office today with the chief complaint of sinus congestion    HPI    The patient complains of moderately severe sinus congestion with drainage and cough. The patient has battling this off and on for weeks. The patient remains on Prevacid for reflux. His symptoms are much improved with Prevacid. Review of Systems   HENT: Positive for congestion. Respiratory: Positive for cough. Negative for shortness of breath. Cardiovascular: Negative for chest pain and leg swelling. Allergies   Allergen Reactions    Amoxicillin Itching    Augmentin [Amoxicillin-Pot Clavulanate] Itching    Chlorhexidine Towelette Itching    Hibiclens [Chlorhexidine Gluconate] Itching    Milk Containing Products Diarrhea    Penicillins Rash    Requip [Ropinirole] Nausea and Vomiting       Current Outpatient Medications   Medication Sig Dispense Refill    lansoprazole (PREVACID) 30 mg capsule 1 cap each AM 90 Cap 3    clarithromycin (BIAXIN) 250 mg tablet 1 tab twice per day 14 Tab 0    benzonatate (TESSALON) 200 mg capsule 1 cap three times per day as needed for cough 30 Cap 1    predniSONE (DELTASONE) 20 mg tablet 3 tabs daily x 3 days, 2 tabs daily x 3 days, 1 tab daily x 3 days, 1/2 tab daily x 3 days 20 Tab 0    fluconazole (DIFLUCAN) 100 mg tablet 1 tab daily (do not take until finished with Biaxin) 7 Tab 0    lisinopril-hydroCHLOROthiazide (PRINZIDE, ZESTORETIC) 20-12.5 mg per tablet TAKE 1 TABLET BY MOUTH DAILY 90 Tab 0    famotidine (PEPCID) 20 mg tablet 1 tab twice per day 60 Tab 5    butalbital-acetaminophen-caff (FIORICET) -40 mg per capsule Take 1 Cap by mouth every eight (8) hours as needed for Pain.  30 Cap 0    labetalol (NORMODYNE) 100 mg tablet TAKE ONE TABLET BY MOUTH TWICE DAILY (Stop Verapamil) 180 Tab 0    ALPRAZolam (XANAX) 0.5 mg tablet Take one half(1/2) tab to one(1) tab by mouth at bedtime as needed for sleep 30 Tab 0    warfarin (COUMADIN) 5 mg tablet TAKE 2 AND 1/2 TABLETS BY MOUTH DAILY OR AS DIRECTED 75 Tab 0    warfarin (COUMADIN) 3 mg tablet TAKE AS DIRECTED PER MD 30 Tab 0    diclofenac (VOLTAREN) 1 % gel Apply 4 g to affected area four (4) times daily. Maximum 16 grams per joint per day. Dispense 5 100 gram tubes 5 Each 0    montelukast (SINGULAIR) 10 mg tablet TAKE 1 TABLET BY MOUTH EVERY DAY (Patient taking differently: TAKE 1 TABLET BY MOUTH EVERY HS) 90 Tab 0    acetaminophen (TYLENOL) 500 mg tablet Take 1,000 mg by mouth every six (6) hours as needed for Pain.          Past Medical History:   Diagnosis Date    Arthritis     Bleeding     Chronic pain     knee and shoulder    GERD (gastroesophageal reflux disease)     Headache(784.0)     migraine    High cholesterol     Hypertension     Lower back pain 11/6/2010    Other chest pain     Pure hypercholesterolemia     Right buttock pain 11/6/2010    Right foot pain     Rotator cuff tear     left-since 2010, worsened tear Jan.    Rotator cuff tear, right     Spinal stenosis     Tendonitis, tibialis     anterior    Thromboembolus ([address]     3 after sx last one 2000       Past Surgical History:   Procedure Laterality Date    FOOT/TOES SURGERY PROC UNLISTED      HX BACK SURGERY      HX HEENT Right 09/2018    eye surgery, macular     HX KNEE REPLACEMENT Left     HX ORTHOPAEDIC  06-25-12    Right foot with excision of bursa and adipose tissue from fifth metatarsal base by Dr. Marilou Escobar      lower back (1992 and 2000)    HX OTHER SURGICAL      left foot (2008)    HX OTHER SURGICAL      Retina repair     HX PROSTATECTOMY  11/2018    HX ROTATOR CUFF REPAIR Right 01/28/2019    by Dr. Betty Liu History     Socioeconomic History    Marital status:      Spouse name: Not on file    Number of children: Not on file    Years of education: Not on file    Highest education level: Not on file   Occupational History    Not on file   Social Needs    Financial resource strain: Not on file    Food insecurity:     Worry: Not on file     Inability: Not on file    Transportation needs:     Medical: Not on file     Non-medical: Not on file   Tobacco Use    Smoking status: Never Smoker    Smokeless tobacco: Never Used   Substance and Sexual Activity    Alcohol use: No    Drug use: No    Sexual activity: Not Currently   Lifestyle    Physical activity:     Days per week: Not on file     Minutes per session: Not on file    Stress: Not on file   Relationships    Social connections:     Talks on phone: Not on file     Gets together: Not on file     Attends Gnosticism service: Not on file     Active member of club or organization: Not on file     Attends meetings of clubs or organizations: Not on file     Relationship status: Not on file    Intimate partner violence:     Fear of current or ex partner: Not on file     Emotionally abused: Not on file     Physically abused: Not on file     Forced sexual activity: Not on file   Other Topics Concern     Service Not Asked    Blood Transfusions Not Asked    Caffeine Concern Not Asked    Occupational Exposure Not Asked    Hobby Hazards Not Asked    Sleep Concern Not Asked    Stress Concern Not Asked    Weight Concern Not Asked    Special Diet Not Asked    Back Care Not Asked    Exercise Not Asked    Bike Helmet Not Asked   [address] Not Asked    Self-Exams Not Asked   Social History Narrative    Not on file       Patient does not have an advanced directive on file    Visit Vitals  /82 (BP 1 Location: Right arm, BP Patient Position: Sitting)   Pulse 67   Temp 97 °F (36.1 °C) (Oral)   Resp 16   Ht 5' 10\" (1.778 m)   Wt 230 lb (104.3 kg)   SpO2 94%   BMI 33.00 kg/m²       Physical Exam   Ears:  Clear canals and TM bilaterally  Oral pharynx:  Normal  No Cervical Lymphadenopathy  No Supraclavicular Lymphadenopathy  Thyroid is Normal  Lungs are normal to percussion. Clear to auscultation   Heart:  S1 S2 are normal, No gallops, No murmurs  No Carotid Bruits  Abdomen:  Normal Bowel Sounds. No tenderness. No masses. No Hepatomegaly or Splenomegaly  LE:  Strong Pedal Pulses.   No Edema        Office Visit on 10/16/2019   Component Date Value Ref Range Status    Color (UA POC) 10/16/2019 Yellow   Final    Clarity (UA POC) 10/16/2019 Clear   Final    Glucose (UA POC) 10/16/2019 Negative  Negative Final    Bilirubin (UA POC) 10/16/2019 Negative  Negative Final    Ketones (UA POC) 10/16/2019 Negative  Negative Final    Specific gravity (UA POC) 10/16/2019 1.015  1.001 - 1.035 Final    Blood (UA POC) 10/16/2019 Trace  Negative Final    pH (UA POC) 10/16/2019 6.0  4.6 - 8.0 Final    Protein (UA POC) 10/16/2019 Negative  Negative Final    Urobilinogen (UA POC) 10/16/2019 0.2 mg/dL  0.2 - 1 Final    Nitrites (UA POC) 10/16/2019 Negative  Negative Final    Leukocyte esterase (UA POC) 10/16/2019 Negative  Negative Final   Office Visit on 10/02/2019   Component Date Value Ref Range Status    Prostate Specific Ag 10/02/2019 <0.03   0 - 4 ng/mL Final    Comment: (Methodology: Roche ECLIA)          Glucose (UA POC) 10/02/2019 Negative  Negative Final    Bilirubin (UA POC) 10/02/2019 Negative  Negative Final    Ketones (UA POC) 10/02/2019 Negative  Negative Final    Specific gravity (UA POC) 10/02/2019 1.015  1.001 - 1.035 Final    Blood (UA POC) 10/02/2019 Trace  Negative Final    pH (UA POC) 10/02/2019 5.0  4.6 - 8.0 Final    Protein (UA POC) 10/02/2019 Negative  Negative Final    Urobilinogen (UA POC) 10/02/2019 0.2 mg/dL  0.2 - 1 Final    Nitrites (UA POC) 10/02/2019 Negative  Negative Final    Leukocyte esterase (UA POC) 10/02/2019 Negative  Negative Final   Hospital Outpatient Visit on 09/23/2019   Component Date Value Ref Range Status    Prothrombin time 09/23/2019 16.5* 11.5 - 15.2 sec Final    INR 09/23/2019 1.4* 0.8 - 1.2   Final    Comment:            INR Therapeutic Ranges         (on stable oral anticoagulant):     INDICATION                INR  DVT/PE/Atrial Fib          2.0-3.0  MI/Mechanical Heart Valve  2.5-3.5     Office Visit on 08/06/2019   Component Date Value Ref Range Status    VALID INTERNAL CONTROL POC 08/06/2019 Yes   Final    INR POC 08/06/2019 1.6   Final       .No results found for any visits on 10/22/19. Assessment / Plan      ICD-10-CM ICD-9-CM    1. Acute recurrent maxillary sinusitis J01.01 461.0    2. Gastroesophageal reflux disease without esophagitis K21.9 530.81 lansoprazole (PREVACID) 30 mg capsule       Biaxin  Prednisone  Tessalon Perles  he was advised to continue his maintenance medications  he is to call if symptoms persist over five days        Follow-up and Dispositions    · Return in about 3 months (around 1/22/2020). I asked Debbie Sutton if he has any questions and I answered the questions. Debbie Sutton states that he understands the treatment plan and agrees with the treatment plan          THIS DOCUMENT  [address]   IT MAY CONTAIN TRANSCRIPTION ERRORS

## 2019-10-25 NOTE — PATIENT INSTRUCTIONS
Mr. Jose Maria Calhoun is here today for anticoagulation monitoring for the diagnosis of DVT. His INR goal is 2.0-3.0 and his current Coumadin dose is 10 mg daily. Today's findings include an INR of 8.0 (normal INR range 0.8-1.2) . Considering Mr. Winters's past history, todays findings, and per the coumadin policy/protocol, Mr. Alexa Holm was instructed to take Coumadin as follows,  Stop coumadin until tues 10/29/19 the recheck. He was also instructed to schedule an appointment in 4 days prior to leaving for an INR check. A full discussion of the nature of anticoagulants has been carried out. A full discussion of the need for frequent and regular monitoring, precise dosage adjustment and compliance was stressed. Side effects of potential bleeding were discussed and Mr. Alexa Holm was instructed to call [phone] if there are any signs of abnormal bleeding. Mr. Alexa Holm was instructed to avoid any OTC items containing aspirin or ibuprofen and prior to starting any new OTC products to consult with his physician or pharmacist to ensure no drug interactions are present. Mr. Alexa Holm was instructed to avoid any major changes in his general diet and to avoid alcohol consumption. .      Mr. Alexa Holm verbalized his understanding of all instructions and will call the office with any questions, concerns, or signs of abnormal bleeding or blood clot.

## 2019-10-29 ENCOUNTER — CLINICAL SUPPORT (OUTPATIENT)
Dept: FAMILY MEDICINE CLINIC | Facility: CLINIC | Age: 64
End: 2019-10-29

## 2019-10-29 DIAGNOSIS — Z86.718 PERSONAL HISTORY OF VENOUS THROMBOSIS AND EMBOLISM: Primary | ICD-10-CM

## 2019-10-29 LAB
INR BLD: 1.1
PT POC: NORMAL
VALID INTERNAL CONTROL?: YES

## 2019-10-29 NOTE — PROGRESS NOTES
Chief Complaint   Patient presents with    Anticoagulation       Jocy Labella presents today for Anticoagulation monitoring. Indication: DVT  INR Goal: 2.0-3.0. Current dosing:  Coumadin 0mg daily. Since last INR check, has the patient. .. Missed Coumadin doses?:  None  Had any medication changes (like antibiotics, PRN meds, etc)? :  no  Made dietary changes:  no    Symptoms: taking coumadin appropriately without any bleeding. Latest INRs:  Lab Results   Component Value Date/Time    INR 1.4 (H) 09/23/2019 04:29 PM    INR 1.9 (H) 06/18/2019 12:36 PM    INR 1.1 01/28/2019 10:16 AM    INR POC 1.1 10/29/2019 04:13 PM    INR POC 8.0 10/25/2019 11:37 AM    INR POC 1.6 08/06/2019 02:11 PM    Prothrombin time 16.5 (H) 09/23/2019 04:29 PM    Prothrombin time 21.6 (H) 06/18/2019 12:36 PM    Prothrombin time 13.5 01/28/2019 10:16 AM         Discussed with Lulu Phan  as the provider in today's visit. New Coumadin dose: . 5 mg daily      Next check to be scheduled for  1 weeks. *Plan of care reviewed with patient. Patient in agreement with plan and expresses understanding. All questions answered and patient encouraged to call or RTO if further questions or concerns.

## 2019-11-04 ENCOUNTER — OFFICE VISIT (OUTPATIENT)
Dept: ORTHOPEDIC SURGERY | Age: 64
End: 2019-11-04

## 2019-11-04 VITALS
DIASTOLIC BLOOD PRESSURE: 74 MMHG | SYSTOLIC BLOOD PRESSURE: 140 MMHG | HEART RATE: 80 BPM | TEMPERATURE: 98.1 F | BODY MASS INDEX: 32.78 KG/M2 | RESPIRATION RATE: 14 BRPM | OXYGEN SATURATION: 97 % | WEIGHT: 229 LBS | HEIGHT: 70 IN

## 2019-11-04 DIAGNOSIS — Z96.652 HISTORY OF TOTAL LEFT KNEE REPLACEMENT (TKR): ICD-10-CM

## 2019-11-04 DIAGNOSIS — S33.8XXD SACRUM SPRAIN, SUBSEQUENT ENCOUNTER: ICD-10-CM

## 2019-11-04 DIAGNOSIS — M51.27 LUMBAGO-SCIATICA DUE TO DISPLACEMENT OF LUMBAR INTERVERTEBRAL DISC: ICD-10-CM

## 2019-11-04 DIAGNOSIS — M47.816 FACET ARTHROPATHY, LUMBAR: ICD-10-CM

## 2019-11-04 DIAGNOSIS — M51.26 DISPLACEMENT OF LUMBAR INTERVERTEBRAL DISC WITHOUT MYELOPATHY: Primary | ICD-10-CM

## 2019-11-04 DIAGNOSIS — Z79.01 CHRONIC ANTICOAGULATION: ICD-10-CM

## 2019-11-04 DIAGNOSIS — M48.061 SPINAL STENOSIS OF LUMBAR REGION WITHOUT NEUROGENIC CLAUDICATION: ICD-10-CM

## 2019-11-04 RX ORDER — METHYLPREDNISOLONE 4 MG/1
TABLET ORAL
Qty: 1 DOSE PACK | Refills: 0 | Status: SHIPPED | OUTPATIENT
Start: 2019-11-04 | End: 2020-03-01

## 2019-11-04 NOTE — PATIENT INSTRUCTIONS
Low Back Arthritis: Exercises  Introduction  Here are some examples of typical rehabilitation exercises for your condition. Start each exercise slowly. Ease off the exercise if you start to have pain. Your doctor or physical therapist will tell you when you can start these exercises and which ones will work best for you. When you are not being active, find a comfortable position for rest. Some people are comfortable on the floor or a medium-firm bed with a small pillow under their head and another under their knees. Some people prefer to lie on their side with a pillow between their knees. Don't stay in one position for too long. Take short walks (10 to 20 minutes) every 2 to 3 hours. Avoid slopes, hills, and stairs until you feel better. Walk only distances you can manage without pain, especially leg pain. How to do the exercises  Pelvic tilt    1. Lie on your back with your knees bent. 2. \"Brace\" your stomach--tighten your muscles by pulling in and imagining your belly button moving toward your spine. 3. Press your lower back into the floor. You should feel your hips and pelvis rock back. 4. Hold for 6 seconds while breathing smoothly. 5. Relax and allow your pelvis and hips to rock forward. 6. Repeat 8 to 12 times. Back stretches    1. Get down on your hands and knees on the floor. 2. Relax your head and allow it to droop. Round your back up toward the ceiling until you feel a nice stretch in your upper, middle, and lower back. Hold this stretch for as long as it feels comfortable, or about 15 to 30 seconds. 3. Return to the starting position with a flat back while you are on your hands and knees. 4. Let your back sway by pressing your stomach toward the floor. Lift your buttocks toward the ceiling. 5. Hold this position for 15 to 30 seconds. 6. Repeat 2 to 4 times. Follow-up care is a key part of your treatment and safety.  Be sure to make and go to all appointments, and call your doctor if you are having problems. It's also a good idea to know your test results and keep a list of the medicines you take. Where can you learn more? Go to http://rivka-jarrell.info/. Enter V735 in the search box to learn more about \"Low Back Arthritis: Exercises. \"  Current as of: June 26, 2019  Content Version: 12.2  © 0533-7755 [employer]. Care instructions adapted under license by [employer] (which disclaims liability or warranty for this information). If you have questions about a medical condition or this instruction, always ask your healthcare professional. [place] [address] any warranty or liability for your use of this information. Trochanteric Bursitis: Exercises  Introduction  Here are some examples of exercises for you to try. The exercises may be suggested for a condition or for rehabilitation. Start each exercise slowly. Ease off the exercises if you start to have pain. You will be told when to start these exercises and which ones will work best for you. How to do the exercises  Hamstring wall stretch    1. Lie on your back in a doorway, with your good leg through the open door. 2. Slide your affected leg up the wall to straighten your knee. You should feel a gentle stretch down the back of your leg. 1. Do not arch your back. 2. Do not bend either knee. 3. Keep one heel touching the floor and the other heel touching the wall. Do not point your toes. 3. Hold the stretch for at least 1 minute to begin. Then try to lengthen the time you hold the stretch to as long as 6 minutes. 4. Repeat 2 to 4 times. 5. If you do not have a place to do this exercise in a doorway, there is another way to do it:  6. Lie on your back, and bend the knee of your affected leg. 7. Loop a towel under the ball and toes of that foot, and hold the ends of the towel in your hands. 8. Straighten your knee, and slowly pull back on the towel.  You should feel a gentle stretch down the back of your leg. 9. Hold the stretch for 15 to 30 seconds. Or even better, hold the stretch for 1 minute if you can. 10. Repeat 2 to 4 times. Straight-leg raises to the outside    1. Lie on your side, with your affected leg on top. 2. Tighten the front thigh muscles of your top leg to keep your knee straight. 3. Keep your hip and your leg straight in line with the rest of your body, and keep your knee pointing forward. Do not drop your hip back. 4. Lift your top leg straight up toward the ceiling, about 12 inches off the floor. Hold for about 6 seconds, then slowly lower your leg. 5. Repeat 8 to 12 times. Clamshell    1. Lie on your side, with your affected leg on top and your head propped on a pillow. Keep your feet and knees together and your knees bent. 2. Raise your top knee, but keep your feet together. Do not let your hips roll back. Your legs should open up like a clamshell. 3. Hold for 6 seconds. 4. Slowly lower your knee back down. Rest for 10 seconds. 5. Repeat 8 to 12 times. Standing quadriceps stretch    1. If you are not steady on your feet, hold on to a chair, counter, or wall. You can also lie on your stomach or your side to do this exercise. 2. Bend the knee of the leg you want to stretch, and reach behind you to grab the front of your foot or ankle with the hand on the same side. For example, if you are stretching your right leg, use your right hand. 3. Keeping your knees next to each other, pull your foot toward your buttock until you feel a gentle stretch across the front of your hip and down the front of your thigh. Your knee should be pointed directly to the ground, and not out to the side. 4. Hold the stretch for 15 to 30 seconds. 5. Repeat 2 to 4 times. Piriformis stretch    1. Lie on your back with your legs straight. 2. Lift your affected leg and bend your knee.  With your opposite hand, reach across your body, and then gently pull your knee toward your opposite shoulder. 3. Hold the stretch for 15 to 30 seconds. 4. Repeat 2 to 4 times. Double knee-to-chest    1. Lie on your back with your knees bent and your feet flat on the floor. You can put a small pillow under your head and neck if it is more comfortable. 2. Bring both knees to your chest.  3. Keep your lower back pressed to the floor. Hold for 15 to 30 seconds. 4. Relax, and lower your knees to the starting position. 5. Repeat 2 to 4 times. Follow-up care is a key part of your treatment and safety. Be sure to make and go to all appointments, and call your doctor if you are having problems. It's also a good idea to know your test results and keep a list of the medicines you take. Where can you learn more? Go to http://rivka-jarrell.info/. Enter Y155 in the search box to learn more about \"Trochanteric Bursitis: Exercises. \"  Current as of: June 26, 2019  Content Version: 12.2  © 5081-7887 [employer]. Care instructions adapted under license by [employer] (which disclaims liability or warranty for this information). If you have questions about a medical condition or this instruction, always ask your healthcare professional. [address] any warranty or liability for your use of this information.

## 2019-11-04 NOTE — LETTER
11/6/19 Patient: Sadaf Teague YOB: 1955 Date of Visit: 11/4/2019 Ileana Yancey MD 
[address] [address] VIA In Basket Dear Ileana Yancey MD, Thank you for referring Mr. Rhona Garcia to [facility] for evaluation. My notes for this consultation are attached. If you have questions, please do not hesitate to call me. I look forward to following your patient along with you. Sincerely, Hakan Henry MD

## 2019-11-04 NOTE — PROGRESS NOTES
[facility]  Kitty Fontana., [address] [place] [address], [facility] [address]  Phone: [phone]  Fax: [phone]    Pt's YOB: 1955    ASSESSMENT   Diagnoses and all orders for this visit:    1. Displacement of lumbar intervertebral disc without myelopathy    2. Lumbago-sciatica due to displacement of lumbar intervertebral disc    3. Sacrum sprain, subsequent encounter    4. Facet arthropathy, lumbar  -     methylPREDNISolone (MEDROL DOSEPACK) 4 mg tablet; Per dose pack instructions    5. Spinal stenosis of lumbar region without neurogenic claudication    6. Chronic anticoagulation    7. History of total left knee replacement (TKR)         IMPRESSION AND PLAN:  Osmani West is a 75 y.o. male with history of lumbar pain. He reports burning pain in the right hip, followed up with CARA Chance, and was told he had bursitis since his last office visit. Pt reports that he did not fill the Medrol Dosepak since his last office visit since he was already on multiple medications for his upper respiratory infection. Pt takes Skelaxin 800 mg as needed. 1) Pt was given information on lumbar arthritis and trochanteric bursitis exercises. 2) He is not a candidate for NSAID's due to anticoagulation with Coumadin.    3) Pt was prescribed a Medrol Dosepak. 4) He will continue taking Skelaxin 800 mg as prescribed and does not need a refill at this time. 5) Mr. Adis Radford has a reminder for a \"due or due soon\" health maintenance. I have asked that he contact his primary care provider, Radha Dejesus MD, for follow-up on this health maintenance. 6)  demonstrated consistency with prescribing. Follow-up and Dispositions    · Return in about 4 months (around 3/4/2020) for Medication follow up. HISTORY OF PRESENT ILLNESS:  Osmani West is a 75 y.o. male with history of lumbar pain and presents to the office today for follow up.  He reports burning pain in the right hip, followed up with CARA De León, and was told he had bursitis since his last office visit. Of note, he is currently on Coumadin. He notes that he had an upper respiratory infection about 5-6 weeks ago, was taking prednisone and clarithromycin, and his INR levels increased to 8. Pt notes that he was instructed to discontinued the Coumadin for several days. He completed the clarithromycin about 1 week ago and he took his last tab of prednisone yesterday. Pt notes that he experienced a flare in is lower back pain in mid 09/2019 which he attributes to his increased coughing with the upper respiratory infection. He reports that he did not fill the Medrol Dosepak since his last office visit since he was already on multiple medications for his infection. Pt takes Skelaxin 800 mg as needed. He reports improvement when using heat on his neck. Pt notes that he had a basal cell skin cancer removed last Thursday by his dermatologist. Dr. Mansi Borden. He also has had a left  knee replacementPt at this time desires to proceed with medication evaluation.     Pain Scale: 1/10    PCP: Jasmin Lorenzo MD     Past Medical History:   Diagnosis Date    Arthritis     Bleeding     Chronic pain     knee and shoulder    GERD (gastroesophageal reflux disease)     Headache(784.0)     migraine    High cholesterol     Hypertension     Lower back pain 11/6/2010    Other chest pain     Pure hypercholesterolemia     Right buttock pain 11/6/2010    Right foot pain     Rotator cuff tear     left-since 2010, worsened tear Jan.    Rotator cuff tear, right     Spinal stenosis     Tendonitis, tibialis     anterior    Thromboembolus ([address]     3 after sx last one 2000        Social History     Socioeconomic History    Marital status:      Spouse name: Not on file    Number of children: Not on file    Years of education: Not on file    Highest education level: Not on file   Occupational History    Not on file   Social Needs    Financial resource strain: Not on file    Food insecurity:     Worry: Not on file     Inability: Not on file    Transportation needs:     Medical: Not on file     Non-medical: Not on file   Tobacco Use    Smoking status: Never Smoker    Smokeless tobacco: Never Used   Substance and Sexual Activity    Alcohol use: No    Drug use: No    Sexual activity: Not Currently   Lifestyle    Physical activity:     Days per week: Not on file     Minutes per session: Not on file    Stress: Not on file   Relationships    Social connections:     Talks on phone: Not on file     Gets together: Not on file     Attends Zoroastrianism service: Not on file     Active member of club or organization: Not on file     Attends meetings of clubs or organizations: Not on file     Relationship status: Not on file    Intimate partner violence:     Fear of current or ex partner: Not on file     Emotionally abused: Not on file     Physically abused: Not on file     Forced sexual activity: Not on file   Other Topics Concern     Service Not Asked    Blood Transfusions Not Asked    Caffeine Concern Not Asked    Occupational Exposure Not Asked   Desai [place] Hazards Not Asked    Sleep Concern Not Asked    Stress Concern Not Asked    Weight Concern Not Asked    Special Diet Not Asked    Back Care Not Asked    Exercise Not Asked    Bike Helmet Not Asked    Seat Belt Not Asked    Self-Exams Not Asked   Social History Narrative    Not on file       Current Outpatient Medications   Medication Sig Dispense Refill    methylPREDNISolone (MEDROL DOSEPACK) 4 mg tablet Per dose pack instructions 1 Dose Pack 0    diclofenac (VOLTAREN) 1 % gel Apply 4 g to affected area four (4) times daily. 100 g 4    guaiFENesin-codeine (CHERATUSSIN AC) 100-10 mg/5 mL solution Take 10 mL by mouth four (4) times daily as needed for Cough for up to 14 days.  Max Daily Amount: 40 mL. 240 mL 0    lansoprazole (PREVACID) 30 mg capsule 1 cap each AM 90 Cap 3    clarithromycin (BIAXIN) 250 mg tablet 1 tab twice per day 14 Tab 0    benzonatate (TESSALON) 200 mg capsule 1 cap three times per day as needed for cough 30 Cap 1    fluconazole (DIFLUCAN) 100 mg tablet 1 tab daily (do not take until finished with Biaxin) 7 Tab 0    lisinopril-hydroCHLOROthiazide (PRINZIDE, ZESTORETIC) 20-12.5 mg per tablet TAKE 1 TABLET BY MOUTH DAILY 90 Tab 0    famotidine (PEPCID) 20 mg tablet 1 tab twice per day 60 Tab 5    butalbital-acetaminophen-caff (FIORICET) -40 mg per capsule Take 1 Cap by mouth every eight (8) hours as needed for Pain. 30 Cap 0    labetalol (NORMODYNE) 100 mg tablet TAKE ONE TABLET BY MOUTH TWICE DAILY (Stop Verapamil) 180 Tab 0    ALPRAZolam (XANAX) 0.5 mg tablet Take one half(1/2) tab to one(1) tab by mouth at bedtime as needed for sleep 30 Tab 0    warfarin (COUMADIN) 5 mg tablet TAKE 2 AND 1/2 TABLETS BY MOUTH DAILY OR AS DIRECTED 75 Tab 0    warfarin (COUMADIN) 3 mg tablet TAKE AS DIRECTED PER MD 30 Tab 0    diclofenac (VOLTAREN) 1 % gel Apply 4 g to affected area four (4) times daily. Maximum 16 grams per joint per day. Dispense 5 100 gram tubes 5 Each 0    montelukast (SINGULAIR) 10 mg tablet TAKE 1 TABLET BY MOUTH EVERY DAY (Patient taking differently: TAKE 1 TABLET BY MOUTH EVERY HS) 90 Tab 0    acetaminophen (TYLENOL) 500 mg tablet Take 1,000 mg by mouth every six (6) hours as needed for Pain. Allergies   Allergen Reactions    Amoxicillin Itching    Augmentin [Amoxicillin-Pot Clavulanate] Itching    Chlorhexidine Towelette Itching    Hibiclens [Chlorhexidine Gluconate] Itching    Milk Containing Products Diarrhea    Penicillins Rash    Requip [Ropinirole] Nausea and Vomiting         REVIEW OF SYSTEMS    Constitutional: Negative for fever, chills, or weight change. Respiratory: Negative for cough or shortness of breath. Cardiovascular: Negative for chest pain or palpitations.   Gastrointestinal: Negative for acid reflux, change in bowel habits, or constipation. Genitourinary: Negative for dysuria and flank pain. Musculoskeletal: Positive for cervical and lumbar pain. Skin: Negative for rash. Neurological: Negative for headaches, dizziness, or numbness. Endo/Heme/Allergies: Negative for increased bruising. Psychiatric/Behavioral: Negative for difficulty with sleep. PHYSICAL EXAMINATION  Visit Vitals  /74   Pulse 80   Temp 98.1 °F (36.7 °C) (Oral)   Resp 14   Ht 5' 10\" (1.778 m)   Wt 229 lb (103.9 kg)   SpO2 97%   BMI 32.86 kg/m²       Constitutional: Awake, alert, and in no acute distress. Neurological: 1+ symmetrical DTRs in the upper extremities. 1+ symmetrical DTRs in the lower extremities. Sensation to light touch is intact. Negative Lepe's sign bilaterally. Skin: warm, dry, and intact. Musculoskeletal: Tenderness to palpation in the lower lumbar region. Moderate pain with extension and axial loading. No pain with internal or external rotation of his hips. Tenderness to palpation over the right greater trochanter. Negative straight leg raise bilaterally. Biceps  Triceps Deltoids Wrist Ext Wrist Flex Hand Intrin   Right +4/5 +4/5 +4/5 +4/5 +4/5 +4/5   Left +4/5 +4/5 +4/5 +4/5 +4/5 +4/5      Hip Flex  Quads Hamstrings Ankle DF EHL Ankle PF   Right +4/5 +4/5 +4/5 +4/5 +4/5 +4/5   Left +4/5 +4/5 +4/5 +4/5 +4/5 +4/5     IMAGING:    Lumbar spine MRI from 01/24/2018 was personally reviewed with the patient and demonstrated:  Results from [facility] on 01/24/18   MRI LUMB SPINE W WO CONT     Narrative MR lumbar spine with and without contrast    CPT CODE: 84789    HISTORY: Chronic and worsening low back pain with left lower extremity pain. Increasing weakness. Remote history of surgeries. No recent injury. COMPARISON: MRI January 2013.     TECHNIQUE: Lumbar spine scanned with axial and sagittal T1W scans, axial and  sagittal T2W scans, and with post gadolinium axial and sagittal T1W scans. Contrast used: 10 cc Gadavist.    FINDINGS:     Prior laminotomies on the left at L4-5 and bilaterally at L5-S1. No fracture,  bone destruction, or fluid collection. Intact lordosis. Normal vertebral body heights. Small less than 5 mm low signal  focus within anterior L4, developed since 2013. No similar focus elsewhere. No  aggressive features. Otherwise generally fatty marrow signal with some chronic  fatty endplate changes straddling L5-S1. Moderate to severe disc space narrowing  and disc desiccation of that level. Mild to moderate narrowing and desiccation  of L3-4 and L4-5. Conus at T12-L1. Axial imaging correlation:    T12-L1:  Patent canal and foramina. L1-L2: Patent canal and foramina. L2-L3: Patent canal and foramina. L3-L4: Broad-based disc osteophyte complex. Bilateral facet arthropathy with  ligamentum flavum thickening and buckling. Moderate concentric spinal stenosis. Particular narrowing of lateral recesses with transient distortion of the  crossing L4 nerves. Axial T2 image 20. Patent foramina.  Progression of  degenerative findings. L4-L5: Postoperative level. Mild broad-based disc osteophyte complex with a  small amount of enhancing scar tissue. Hypertrophic facet arthropathy. Moderate  spinal stenosis. Narrowing of the lateral recesses left more than right. Transient distortion of the crossing nerves particularly left L5. Axial T2 image  13. Mild foraminal stenoses.  Unchanged. L5-S1: Postoperative level. Broad-based disc osteophyte complex with enhancing  scar tissue centrally. Hypertrophic facet arthropathy. No significant spinal  stenosis. Moderately severe bilateral foraminal stenoses. Unchanged. Incidental imaging of regional soft tissues unremarkable.                  Impression IMPRESSION:    1.  Some progression of degenerative disc and facet disease at L3-4, with  moderate spinal stenosis and potentially impingement of the crossing L4 nerves,  left more than right. 2. Stable postsurgical and degenerative findings at L4-5 and L5-S1.             Bilateral knee x-rays from 03/14/2017 demonstrated:  Medial compartment narrowing bilaterally, L>R         Cervical Spine MRI from 12/19/2016 demonstrated:  Results from Hospital Encounter on 12/19/16   MRI CERV SPINE WO CONT     Narrative MRI of cervical spine without contrast    HISTORY: Chronic progressive neck pain with headaches. COMPARISON: No prior MRI. Cervical spine radiographs from 12/1/2016. TECHNIQUE: T1 weighted, T2 FSE, FSE inversion recovery sagittal images are  supplemented by T2 weighted and GRE/medic axial images. FINDINGS: Normal alignment and vertebral body heights. Normal marrow signal  except for mild endplate degenerative change. Cervical cord is normal in signal  and caliber. Visualized posterior fossa contents look normal. Paraspinal soft  tissues look normal.    C2-3: No significant degenerative disc disease or spinal stenosis. C3-4: Small nonfocal disc protrusion which contacts the ventral cord and causes  borderline spinal stenosis. No foraminal stenosis. C4-5: No significant degenerative disc disease. Mildly hypertrophic facets. No  spinal stenosis. C5-6: Disc is narrowed with diffusely bulging disc and osteophyte complex. Facets are mildly hypertrophic. Mild spinal canal and moderate left foraminal  stenoses. C6-7: Disc is narrowed with diffusely bulging disc and osteophyte complex. Facets are mildly hypertrophic. Mild spinal canal and moderate bilateral  foraminal stenoses. C7-T1: No significant degenerative disc disease or spinal stenosis.               Impression Impression:    Disc osteophyte complexes causing mild spinal canal stenoses at C5-6 and C6-7.     Moderate left foraminal stenosis at C5-6 and moderate bilateral foraminal  stenoses at C6-7.        Written by Maria Elena Sharma, as dictated by Alexandra Harris MD.  I, Dr. Alexandra Harris confirm that all documentation is accurate.

## 2019-11-08 NOTE — PROGRESS NOTES
[facility] - Jack Juarez  [facility]  [phone] fax    Physical Therapy Discharge Summary    Patient name: Reji Winters Start of Care: 2019   Referral source: Debbie Brown PA : [id number]   Medical/Treatment Diagnosis: Presence of left artificial knee joint [Z96.652]  Iliotibial band syndrome, left leg [M76.32]  Payor: [place] MEDICARE / Plan: [place] MEDICARE PART A & B / Product Type: Medicare /  Onset Date:about 6 months ago                Prior Hospitalization: see medical history Provider#: [id number]   Medications: Verified on Patient Summary List     Comorbidities: arthritis, HTN, Left TKR 3-2018, prostate cancer surgery  Prior Level of Function: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 Status at last note/certification:progressing   Status at discharge: not met, 4/5 hip abduction and 4-/5 hip extension     Goal:Pt will be independent with an updated HEP to continue self progression at home and pain management upon D/C. Status at last note/certification: reviewed   Status at discharge: met    ASSESSMENT/RECOMMENDATIONS: Patient has made good overall progress with physical therapy. Per PTA, \"He is independent with strengthening to continue to improve stability of the left knee to reduce buckling and pain with sit to stand transfers. He has improved left knee ROM and B hip strength. He is only having 3/10 pain at worst now. He has returned to work and will continue with self progression at home and the gym. Skilled PT is no longer necessary and pt will D/C at this time. \"     [x]Discontinue therapy: [x]Patient has reached or is progressing toward set goals      []Patient is non-compliant or has abdicated      []Due to lack of appreciable progress towards set goals    Oj Zelaya, PT 11/8/2019 1:38 PM

## 2019-11-12 ENCOUNTER — CLINICAL SUPPORT (OUTPATIENT)
Dept: FAMILY MEDICINE CLINIC | Facility: CLINIC | Age: 64
End: 2019-11-12

## 2019-11-12 DIAGNOSIS — Z86.718 PERSONAL HISTORY OF VENOUS THROMBOSIS AND EMBOLISM: Primary | ICD-10-CM

## 2019-11-12 LAB
INR BLD: 1.6
PT POC: NORMAL
VALID INTERNAL CONTROL?: YES

## 2019-11-12 NOTE — PATIENT INSTRUCTIONS
Mr. Kellie Henley is here today for anticoagulation monitoring for the diagnosis of Atrial Fibrillation. His INR goal is 2.0-3.0 and his current Coumadin dose is 8 mg daily. Today's findings include an INR of 1.6 (normal INR range 0.8-1.2) . Considering Mr. Winters's past history, todays findings, and per the coumadin policy/protocol, Mr. Anni Pedraza was instructed to take Coumadin as follows,  10 mg x 3 days a week , 8 mg other days. He was also instructed to schedule an appointment in 2 weeks prior to leaving for an INR check. A full discussion of the nature of anticoagulants has been carried out. A full discussion of the need for frequent and regular monitoring, precise dosage adjustment and compliance was stressed. Side effects of potential bleeding were discussed and Mr. Anni Pedraza was instructed to call [phone] if there are any signs of abnormal bleeding. Mr. Anni Pedraza was instructed to avoid any OTC items containing aspirin or ibuprofen and prior to starting any new OTC products to consult with his physician or pharmacist to ensure no drug interactions are present. Mr. Anni Pedraza was instructed to avoid any major changes in his general diet and to avoid alcohol consumption. .        Mr. Anni Pedraza verbalized his understanding of all instructions and will call the office with any questions, concerns, or signs of abnormal bleeding or blood clot.

## 2019-11-15 ENCOUNTER — TELEPHONE (OUTPATIENT)
Dept: FAMILY MEDICINE CLINIC | Facility: CLINIC | Age: 64
End: 2019-11-15

## 2019-11-16 RX ORDER — PANTOPRAZOLE SODIUM 40 MG/1
40 TABLET, DELAYED RELEASE ORAL DAILY
Qty: 30 TAB | Refills: 2 | Status: SHIPPED | OUTPATIENT
Start: 2019-11-16 | End: 2020-12-04

## 2019-11-16 RX ORDER — WARFARIN SODIUM 5 MG/1
TABLET ORAL
Qty: 75 TAB | Refills: 0 | Status: SHIPPED | OUTPATIENT
Start: 2019-11-16 | End: 2019-12-16 | Stop reason: ALTCHOICE

## 2019-11-18 RX ORDER — MONTELUKAST SODIUM 10 MG/1
TABLET ORAL
Qty: 90 TAB | Refills: 0 | Status: SHIPPED | OUTPATIENT
Start: 2019-11-18 | End: 2020-03-01

## 2019-11-19 ENCOUNTER — TELEPHONE (OUTPATIENT)
Dept: FAMILY MEDICINE CLINIC | Facility: CLINIC | Age: 64
End: 2019-11-19

## 2019-11-19 NOTE — TELEPHONE ENCOUNTER
Patient requesting letter clearing him for oral surgery with Dr. Alice Tracey. He is scheduled on 12/16 but also on cancellation list  Dr Alice Tracey office number is [phone]/Q [phone]. Also, patient is waiting on prior authorization on Prevacid. Please advise.

## 2019-11-22 ENCOUNTER — OFFICE VISIT (OUTPATIENT)
Dept: ORTHOPEDIC SURGERY | Age: 64
End: 2019-11-22

## 2019-11-22 VITALS
HEIGHT: 70 IN | BODY MASS INDEX: 33.27 KG/M2 | RESPIRATION RATE: 18 BRPM | HEART RATE: 77 BPM | OXYGEN SATURATION: 90 % | WEIGHT: 232.4 LBS | TEMPERATURE: 97.4 F | DIASTOLIC BLOOD PRESSURE: 74 MMHG | SYSTOLIC BLOOD PRESSURE: 130 MMHG

## 2019-11-22 DIAGNOSIS — M70.61 TROCHANTERIC BURSITIS OF RIGHT HIP: Primary | ICD-10-CM

## 2019-11-22 DIAGNOSIS — M25.551 RIGHT HIP PAIN: ICD-10-CM

## 2019-11-22 RX ORDER — BETAMETHASONE SODIUM PHOSPHATE AND BETAMETHASONE ACETATE 3; 3 MG/ML; MG/ML
6 INJECTION, SUSPENSION INTRA-ARTICULAR; INTRALESIONAL; INTRAMUSCULAR; SOFT TISSUE ONCE
Qty: 1 ML | Refills: 0
Start: 2019-11-22 | End: 2019-11-22

## 2019-11-22 RX ORDER — CELECOXIB 200 MG/1
200 CAPSULE ORAL 2 TIMES DAILY
Qty: 60 CAP | Refills: 2 | Status: SHIPPED | OUTPATIENT
Start: 2019-11-22 | End: 2020-02-20

## 2019-11-22 NOTE — PROGRESS NOTES
[address] [facility], [address] [facility]  [phone]           Patient: Cat Leonard                MRN: [id number]       SSN: [id number]  YOB: 1955        AGE: 75 y.o. SEX: male  Body mass index is 33.35 kg/m². PCP: Lory Yuen MD  11/22/19      This office note has been dictated. REVIEW OF SYSTEMS:  Constitutional: Negative for fever, chills, weight loss and malaise/fatigue. HENT: Negative. Eyes: Negative. Respiratory: Negative. Cardiovascular: Negative. Gastrointestinal: No bowel incontinence or constipation. Genitourinary: No bladder incontinence or saddle anesthesia. Skin: Negative. Neurological: Negative. Endo/Heme/Allergies: Negative. Psychiatric/Behavioral: Negative. Musculoskeletal: As per HPI above. Past Medical History:   Diagnosis Date    Arthritis     Bleeding     Chronic pain     knee and shoulder    GERD (gastroesophageal reflux disease)     Headache(784.0)     migraine    High cholesterol     Hypertension     Lower back pain 11/6/2010    Other chest pain     Pure hypercholesterolemia     Right buttock pain 11/6/2010    Right foot pain     Rotator cuff tear     left-since 2010, worsened tear Jan.    Rotator cuff tear, right     Spinal stenosis     Tendonitis, tibialis     anterior    Thromboembolus (HCC)     3 after sx last one 2000         Current Outpatient Medications:     betamethasone (CELESTONE SOLUSPAN) 6 mg/mL injection, 1 mL by Intra artICUlar route once for 1 dose., Disp: 1 mL, Rfl: 0    montelukast (SINGULAIR) 10 mg tablet, TAKE 1 TABLET BY MOUTH EVERY DAY, Disp: 90 Tab, Rfl: 0    warfarin (COUMADIN) 5 mg tablet, TAKE 2 AND 1/2 TABLETS BY MOUTH DAILY OR AS DIRECTED, Disp: 75 Tab, Rfl: 0    pantoprazole (PROTONIX) 40 mg tablet, Take 1 Tab by mouth daily. , Disp: 30 Tab, Rfl: 2    methylPREDNISolone (MEDROL DOSEPACK) 4 mg tablet, Per dose pack instructions, Disp: 1 Dose Pack, Rfl: 0    diclofenac (VOLTAREN) 1 % gel, Apply 4 g to affected area four (4) times daily. , Disp: 100 g, Rfl: 4    lansoprazole (PREVACID) 30 mg capsule, 1 cap each AM, Disp: 90 Cap, Rfl: 3    clarithromycin (BIAXIN) 250 mg tablet, 1 tab twice per day, Disp: 14 Tab, Rfl: 0    benzonatate (TESSALON) 200 mg capsule, 1 cap three times per day as needed for cough, Disp: 30 Cap, Rfl: 1    fluconazole (DIFLUCAN) 100 mg tablet, 1 tab daily (do not take until finished with Biaxin), Disp: 7 Tab, Rfl: 0    lisinopril-hydroCHLOROthiazide (PRINZIDE, ZESTORETIC) 20-12.5 mg per tablet, TAKE 1 TABLET BY MOUTH DAILY, Disp: 90 Tab, Rfl: 0    famotidine (PEPCID) 20 mg tablet, 1 tab twice per day, Disp: 60 Tab, Rfl: 5    butalbital-acetaminophen-caff (FIORICET) -40 mg per capsule, Take 1 Cap by mouth every eight (8) hours as needed for Pain., Disp: 30 Cap, Rfl: 0    labetalol (NORMODYNE) 100 mg tablet, TAKE ONE TABLET BY MOUTH TWICE DAILY (Stop Verapamil), Disp: 180 Tab, Rfl: 0    ALPRAZolam (XANAX) 0.5 mg tablet, Take one half(1/2) tab to one(1) tab by mouth at bedtime as needed for sleep, Disp: 30 Tab, Rfl: 0    warfarin (COUMADIN) 3 mg tablet, TAKE AS DIRECTED PER MD, Disp: 30 Tab, Rfl: 0    diclofenac (VOLTAREN) 1 % gel, Apply 4 g to affected area four (4) times daily. Maximum 16 grams per joint per day.  Dispense 5 100 gram tubes, Disp: 5 Each, Rfl: 0    montelukast (SINGULAIR) 10 mg tablet, TAKE 1 TABLET BY MOUTH EVERY DAY (Patient taking differently: TAKE 1 TABLET BY MOUTH EVERY HS), Disp: 90 Tab, Rfl: 0    acetaminophen (TYLENOL) 500 mg tablet, Take 1,000 mg by mouth every six (6) hours as needed for Pain., Disp: , Rfl:     Allergies   Allergen Reactions    Amoxicillin Itching    Augmentin [Amoxicillin-Pot Clavulanate] Itching    Chlorhexidine Towelette Itching    Hibiclens [Chlorhexidine Gluconate] Itching    Milk Containing Products Diarrhea    Penicillins Rash    Requip [Ropinirole] Nausea and Vomiting       Social History     Socioeconomic History    Marital status:      Spouse name: Not on file    Number of children: Not on file    Years of education: Not on file    Highest education level: Not on file   Occupational History    Not on file   Social Needs    Financial resource strain: Not on file    Food insecurity:     Worry: Not on file     Inability: Not on file    Transportation needs:     Medical: Not on file     Non-medical: Not on file   Tobacco Use    Smoking status: Never Smoker    Smokeless tobacco: Never Used   Substance and Sexual Activity    Alcohol use: No    Drug use: No    Sexual activity: Not Currently   Lifestyle    Physical activity:     Days per week: Not on file     Minutes per session: Not on file    Stress: Not on file   Relationships    Social connections:     Talks on phone: Not on file     Gets together: Not on file     Attends [employer] service: Not on file     Active member of club or organization: Not on file     Attends meetings of clubs or organizations: Not on file     Relationship status: Not on file    Intimate partner violence:     Fear of current or ex partner: Not on file     Emotionally abused: Not on file     Physically abused: Not on file     Forced sexual activity: Not on file   Other Topics Concern     Service Not Asked    Blood Transfusions Not Asked    Caffeine Concern Not Asked    Occupational Exposure Not Asked   Everet Pepper Hazards Not Asked    Sleep Concern Not Asked    Stress Concern Not Asked    Weight Concern Not Asked    Special Diet Not Asked    Back Care Not Asked    Exercise Not Asked    Bike Helmet Not Asked   [address] Not Asked    Self-Exams Not Asked   Social History Narrative    Not on file       Past Surgical History:   Procedure Laterality Date    FOOT/TOES SURGERY PROC UNLISTED      HX BACK SURGERY      HX HEENT Right 09/2018    eye surgery, macular     HX KNEE REPLACEMENT Left     HX ORTHOPAEDIC  06-25-12    Right foot with excision of bursa and adipose tissue from fifth metatarsal base by Dr. Casey Joshua      lower back (1992 and 2000)    HX OTHER SURGICAL      left foot (2008)    HX OTHER SURGICAL      Retina repair     HX PROSTATECTOMY  11/2018    HX ROTATOR CUFF REPAIR Right 01/28/2019    by Dr. Bobbetta Kanner             * Patient was identified by name and date of birth   * Agreement on procedure being performed was verified  * Risks and Benefits explained to the patient  * Procedure site verified and marked as necessary  * Patient was positioned for comfort  * Consent was signed and verified  3:43 PM    The patient was instructed on post injection care. We did see Mr. Merry Monaco for followup with regards to complaints of right laterally-based hip discomfort. The patient has discomfort when he lies on the right side at night, as well as with ambulation and sometimes driving. He denies any butt pain. He denies any radiating pain down the right lower extremity. He denies any groin pain. He has had no change in his bowel or bladder habits and no fevers, chills, systemic changes, or injuries to report. He is status post left knee replacement. He has done quite well with it. He is progressing nicely. PHYSICAL EXAMINATION:  In general, the patient is alert and oriented x 3 in no acute distress. The patient is well-developed, well-nourished, with a normal affect. The patient is afebrile. HEENT:  Head is normocephalic and atraumatic. Pupils are equally round and reactive to light and accommodation. Extraocular eye movements are intact. Neck is supple. Trachea is midline. No JVD is present. Breathing is nonlabored. Examination of the lower extremities reveals pain-free range of motion of the hips. He does have discomfort to palpation of the greater trochanteric bursa on the right side.  There is negative straight leg raise. There is negative calf tenderness. There is negative Giorgio's. There is no evidence of DVT present. RADIOGRAPHS:  Radiographs in the office today, including AP of the pelvis and AP and cross table of the right hip, show no acute bony abnormalities and mild arthritis of the hips. He does have some degenerative changes noted to the low lumbar spine. ASSESSMENT:  Right hip trochanteric bursitis. PLAN:  At this point, we are going to move forward with a cortisone injection for the right hip today. After informed and written consent with an appropriate time out performed, and under sterile conditions, with ultrasound-guided assistance, the right hip was prepped with Betadine and 6 mg of Celestone was injected without complications. The patient tolerated the injection well. The patient was instructed on post injection care. We will see him back in the office in about three months' time for evaluation.                    JR Willie GATES, ESPERANZA, ATC

## 2019-11-22 NOTE — PROGRESS NOTES
1. Have you been to the ER, urgent care clinic since your last visit? Hospitalized since your last visit? No    2. Have you seen or consulted any other health care providers outside of the [address] [place] [address] since your last visit? Include any pap smears or colon screening.  No

## 2019-11-25 ENCOUNTER — TELEPHONE (OUTPATIENT)
Dept: FAMILY MEDICINE CLINIC | Facility: CLINIC | Age: 64
End: 2019-11-25

## 2019-12-04 ENCOUNTER — DOCUMENTATION ONLY (OUTPATIENT)
Dept: FAMILY MEDICINE CLINIC | Facility: CLINIC | Age: 64
End: 2019-12-04

## 2019-12-04 NOTE — PROGRESS NOTES
Order placed for Prior Authrization for Protonix 40 mg sent into the insurance has been approved for 11/04

## 2019-12-05 RX ORDER — BUTALBITAL, ACETAMINOPHEN AND CAFFEINE 300; 40; 50 MG/1; MG/1; MG/1
1 CAPSULE ORAL
Qty: 30 CAP | Refills: 2 | Status: SHIPPED | OUTPATIENT
Start: 2019-12-05 | End: 2019-12-16

## 2019-12-05 NOTE — TELEPHONE ENCOUNTER
Requested Prescriptions     Pending Prescriptions Disp Refills    butalbital-acetaminophen-caff (FIORICET) -40 mg per capsule 30 Cap 2     Sig: Take 1 Cap by mouth every eight (8) hours as needed for Pain.

## 2019-12-09 ENCOUNTER — CLINICAL SUPPORT (OUTPATIENT)
Dept: FAMILY MEDICINE CLINIC | Facility: CLINIC | Age: 64
End: 2019-12-09

## 2019-12-09 DIAGNOSIS — Z51.81 ENCOUNTER FOR MEDICATION MONITORING: Primary | ICD-10-CM

## 2019-12-09 DIAGNOSIS — Z86.718 PERSONAL HISTORY OF VENOUS THROMBOSIS AND EMBOLISM: ICD-10-CM

## 2019-12-09 DIAGNOSIS — Z79.01 WARFARIN ANTICOAGULATION: ICD-10-CM

## 2019-12-09 LAB
INR BLD: 1.7
PT POC: NORMAL
VALID INTERNAL CONTROL?: YES

## 2019-12-09 NOTE — PATIENT INSTRUCTIONS
Mr. Kira Santiago is here today for anticoagulation monitoring for the diagnosis of Atrial Fibrillation. His INR goal is 2.0-3.0 and his current Coumadin dose. 10 mg x 2 days, 8 mg x 5 days    Today's findings include an INR of 1.7 (normal INR range 0.8-1.2) . Considering Mr. Winters's past history, todays findings, and per the coumadin policy/protocol, Mr. Danielle Zuniga was instructed to take Coumadin as follows,  Same dose. He was also instructed to schedule an appointment in 1 weeks prior to leaving for an INR check. A full discussion of the nature of anticoagulants has been carried out. A full discussion of the need for frequent and regular monitoring, precise dosage adjustment and compliance was stressed. Side effects of potential bleeding were discussed and Mr. Danielle Zuniga was instructed to call [phone] if there are any signs of abnormal bleeding. Mr. Danielle Zuniga was instructed to avoid any OTC items containing aspirin or ibuprofen and prior to starting any new OTC products to consult with his physician or pharmacist to ensure no drug interactions are present. Mr. Danielle Zuniga was instructed to avoid any major changes in his general diet and to avoid alcohol consumption. .        Mr. Danielle Zuniga verbalized his understanding of all instructions and will call the office with any questions, concerns, or signs of abnormal bleeding or blood clot.

## 2019-12-15 RX ORDER — WARFARIN 3 MG/1
TABLET ORAL
Qty: 30 TAB | Refills: 0 | Status: SHIPPED | OUTPATIENT
Start: 2019-12-15 | End: 2019-12-16 | Stop reason: ALTCHOICE

## 2019-12-16 ENCOUNTER — CLINICAL SUPPORT (OUTPATIENT)
Dept: FAMILY MEDICINE CLINIC | Facility: CLINIC | Age: 64
End: 2019-12-16

## 2019-12-16 DIAGNOSIS — Z51.81 ENCOUNTER FOR MEDICATION MONITORING: Primary | ICD-10-CM

## 2019-12-16 DIAGNOSIS — Z79.01 WARFARIN ANTICOAGULATION: ICD-10-CM

## 2019-12-16 DIAGNOSIS — Z86.718 PERSONAL HISTORY OF VENOUS THROMBOSIS AND EMBOLISM: ICD-10-CM

## 2019-12-16 LAB
INR BLD: 1.6
Lab: 1.6
PT POC: NORMAL
VALID INTERNAL CONTROL?: YES

## 2019-12-16 RX ORDER — BUTALBITAL, ACETAMINOPHEN AND CAFFEINE 300; 40; 50 MG/1; MG/1; MG/1
CAPSULE ORAL
Qty: 180 CAP | Refills: 1 | Status: SHIPPED | OUTPATIENT
Start: 2019-12-16

## 2019-12-16 RX ORDER — WARFARIN 3 MG/1
TABLET ORAL
Qty: 30 TAB | Refills: 0 | Status: SHIPPED | OUTPATIENT
Start: 2019-12-16 | End: 2020-03-01

## 2019-12-16 NOTE — PROGRESS NOTES
Mr. Jonny Burris is here today for anticoagulation monitoring for the diagnosis of Atrial Fibrillation. His INR goal is 2.0-3.0 and his current Coumadin dose is 8mg x 5 days and 10mg x 2 days. Today's findings include an INR of 1.6 (normal INR range 0.8-1.2) . Considering Mr. Winters's past history, todays findings, and per the coumadin policy/protocol, Mr. Merry Monaco was instructed to take Coumadin as follows,  10mg x 3 days and 8mg x 4 days. He was also instructed to schedule an appointment in 1 1/2 weeks prior to leaving for an INR check. A full discussion of the nature of anticoagulants has been carried out. A full discussion of the need for frequent and regular monitoring, precise dosage adjustment and compliance was stressed. Side effects of potential bleeding were discussed and Mr. Merry Monaco was instructed to call [phone] if there are any signs of abnormal bleeding. Mr. Merry Monaco was instructed to avoid any OTC items containing aspirin or ibuprofen and prior to starting any new OTC products to consult with his physician or pharmacist to ensure no drug interactions are present. Mr. Merry Monaco was instructed to avoid any major changes in his general diet and to avoid alcohol consumption. .        Mr. Merry Monaco verbalized his understanding of all instructions and will call the office with any questions, concerns, or signs of abnormal bleeding or blood clot.

## 2019-12-19 ENCOUNTER — TELEPHONE (OUTPATIENT)
Dept: FAMILY MEDICINE CLINIC | Facility: CLINIC | Age: 64
End: 2019-12-19

## 2019-12-24 ENCOUNTER — DOCUMENTATION ONLY (OUTPATIENT)
Dept: FAMILY MEDICINE CLINIC | Facility: CLINIC | Age: 64
End: 2019-12-24

## 2019-12-24 NOTE — PROGRESS NOTES
Pre auth sent in for the patients Fioricet and was approved from 11/19/19 to 6/16/20/20. Copy sent to the pharmacy.

## 2019-12-26 RX ORDER — LABETALOL 100 MG/1
TABLET, FILM COATED ORAL
Qty: 180 TAB | Refills: 0 | Status: SHIPPED | OUTPATIENT
Start: 2019-12-26

## 2019-12-30 NOTE — TELEPHONE ENCOUNTER
Prior auth. Was submitted on the last Rx written which was protonix and it was approved and patient was notified when it was done.

## 2020-01-13 NOTE — TELEPHONE ENCOUNTER
Requested Prescriptions     Pending Prescriptions Disp Refills    warfarin (COUMADIN) 5 mg tablet 75 Tab 0    lisinopril-hydroCHLOROthiazide (PRINZIDE, ZESTORETIC) 20-12.5 mg per tablet 90 Tab 0

## 2020-01-14 RX ORDER — LISINOPRIL AND HYDROCHLOROTHIAZIDE 12.5; 2 MG/1; MG/1
TABLET ORAL
Qty: 90 TAB | Refills: 1 | Status: SHIPPED | OUTPATIENT
Start: 2020-01-14

## 2020-01-16 RX ORDER — WARFARIN SODIUM 5 MG/1
TABLET ORAL
Qty: 75 TAB | Refills: 0 | Status: SHIPPED | OUTPATIENT
Start: 2020-01-16

## 2020-02-07 ENCOUNTER — OFFICE VISIT (OUTPATIENT)
Dept: ORTHOPEDIC SURGERY | Age: 65
End: 2020-02-07

## 2020-02-07 VITALS
HEART RATE: 69 BPM | WEIGHT: 232 LBS | BODY MASS INDEX: 33.21 KG/M2 | OXYGEN SATURATION: 92 % | HEIGHT: 70 IN | DIASTOLIC BLOOD PRESSURE: 81 MMHG | RESPIRATION RATE: 12 BRPM | TEMPERATURE: 96.7 F | SYSTOLIC BLOOD PRESSURE: 140 MMHG

## 2020-02-07 DIAGNOSIS — M25.551 RIGHT HIP PAIN: ICD-10-CM

## 2020-02-07 DIAGNOSIS — M70.61 TROCHANTERIC BURSITIS OF RIGHT HIP: Primary | ICD-10-CM

## 2020-02-07 DIAGNOSIS — M65.9 SYNOVITIS OF KNEE: ICD-10-CM

## 2020-02-07 DIAGNOSIS — M25.562 LEFT KNEE PAIN, UNSPECIFIED CHRONICITY: ICD-10-CM

## 2020-02-07 RX ORDER — BETAMETHASONE SODIUM PHOSPHATE AND BETAMETHASONE ACETATE 3; 3 MG/ML; MG/ML
6 INJECTION, SUSPENSION INTRA-ARTICULAR; INTRALESIONAL; INTRAMUSCULAR; SOFT TISSUE ONCE
Qty: 1 ML | Refills: 0
Start: 2020-02-07 | End: 2020-02-07

## 2020-02-07 NOTE — PROGRESS NOTES
[address] [facility], [address] [facility]  [phone]           Patient: Samaria Heard                MRN: [id number]       SSN: [id number]  YOB: 1955        AGE: 75 y.o. SEX: male  Body mass index is 33.29 kg/m². PCP: Anil Valadez MD  02/07/20      This office note has been dictated. REVIEW OF SYSTEMS:  Constitutional: Negative for fever, chills, weight loss and malaise/fatigue. HENT: Negative. Eyes: Negative. Respiratory: Negative. Cardiovascular: Negative. Gastrointestinal: No bowel incontinence or constipation. Genitourinary: No bladder incontinence or saddle anesthesia. Skin: Negative. Neurological: Negative. Endo/Heme/Allergies: Negative. Psychiatric/Behavioral: Negative. Musculoskeletal: As per HPI above. Past Medical History:   Diagnosis Date    Arthritis     Bleeding     Chronic pain     knee and shoulder    GERD (gastroesophageal reflux disease)     Headache(784.0)     migraine    High cholesterol     Hypertension     Lower back pain 11/6/2010    Other chest pain     Pure hypercholesterolemia     Right buttock pain 11/6/2010    Right foot pain     Rotator cuff tear     left-since 2010, worsened tear Young.    Rotator cuff tear, right     Spinal stenosis     Tendonitis, tibialis     anterior    Thromboembolus (HCC)     3 after sx last one 2000         Current Outpatient Medications:     betamethasone (CELESTONE SOLUSPAN) 6 mg/mL injection, 1 mL by Intra artICUlar route once for 1 dose., Disp: 1 mL, Rfl: 0    warfarin (COUMADIN) 5 mg tablet, TAKE 2 AND 1/2 TABLETS BY MOUTH DAILY OR AS DIRECTED, Disp: 75 Tab, Rfl: 0    lisinopril-hydroCHLOROthiazide (PRINZIDE, ZESTORETIC) 20-12.5 mg per tablet, TAKE 1 TABLET BY MOUTH DAILY, Disp: 90 Tab, Rfl: 1    labetalol (NORMODYNE) 100 mg tablet, TAKE 1 TABLET BY MOUTH TWICE DAILY.  STOP VERAPAMIL, Disp: 180 Tab, Rfl: 0   butalbital-acetaminophen-caff (FIORICET) -40 mg per capsule, 2 cap three times per day as needed for headache, Disp: 180 Cap, Rfl: 1    warfarin (COUMADIN) 3 mg tablet, As directed to make 10 mg on Mon, Wed, Friday, 8 mg on the other days, Disp: 30 Tab, Rfl: 0    montelukast (SINGULAIR) 10 mg tablet, TAKE 1 TABLET BY MOUTH EVERY DAY, Disp: 90 Tab, Rfl: 0    diclofenac (VOLTAREN) 1 % gel, Apply 4 g to affected area four (4) times daily. , Disp: 100 g, Rfl: 4    ALPRAZolam (XANAX) 0.5 mg tablet, Take one half(1/2) tab to one(1) tab by mouth at bedtime as needed for sleep, Disp: 30 Tab, Rfl: 0    diclofenac (VOLTAREN) 1 % gel, Apply 4 g to affected area four (4) times daily. Maximum 16 grams per joint per day. Dispense 5 100 gram tubes, Disp: 5 Each, Rfl: 0    montelukast (SINGULAIR) 10 mg tablet, TAKE 1 TABLET BY MOUTH EVERY DAY (Patient taking differently: TAKE 1 TABLET BY MOUTH EVERY HS), Disp: 90 Tab, Rfl: 0    acetaminophen (TYLENOL) 500 mg tablet, Take 1,000 mg by mouth every six (6) hours as needed for Pain., Disp: , Rfl:     celecoxib (CELEBREX) 200 mg capsule, Take 1 Cap by mouth two (2) times a day for 90 days. Take with food. , Disp: 60 Cap, Rfl: 2    pantoprazole (PROTONIX) 40 mg tablet, Take 1 Tab by mouth daily. , Disp: 30 Tab, Rfl: 2    methylPREDNISolone (MEDROL DOSEPACK) 4 mg tablet, Per dose pack instructions, Disp: 1 Dose Pack, Rfl: 0    lansoprazole (PREVACID) 30 mg capsule, 1 cap each AM, Disp: 90 Cap, Rfl: 3    benzonatate (TESSALON) 200 mg capsule, 1 cap three times per day as needed for cough, Disp: 30 Cap, Rfl: 1    famotidine (PEPCID) 20 mg tablet, 1 tab twice per day, Disp: 60 Tab, Rfl: 5    Allergies   Allergen Reactions    Amoxicillin Itching    Augmentin [Amoxicillin-Pot Clavulanate] Itching    Chlorhexidine Towelette Itching    Hibiclens [Chlorhexidine Gluconate] Itching    Milk Containing Products Diarrhea    Penicillins Rash    Requip [Ropinirole] Nausea and Vomiting       Social History     Socioeconomic History    Marital status:      Spouse name: Not on file    Number of children: Not on file    Years of education: Not on file    Highest education level: Not on file   Occupational History    Not on file   Social Needs    Financial resource strain: Not on file    Food insecurity:     Worry: Not on file     Inability: Not on file    Transportation needs:     Medical: Not on file     Non-medical: Not on file   Tobacco Use    Smoking status: Never Smoker    Smokeless tobacco: Never Used   Substance and Sexual Activity    Alcohol use: No    Drug use: No    Sexual activity: Not Currently   Lifestyle    Physical activity:     Days per week: Not on file     Minutes per session: Not on file    Stress: Not on file   Relationships    Social connections:     Talks on phone: Not on file     Gets together: Not on file     Attends [employer] service: Not on file     Active member of club or organization: Not on file     Attends meetings of clubs or organizations: Not on file     Relationship status: Not on file    Intimate partner violence:     Fear of current or ex partner: Not on file     Emotionally abused: Not on file     Physically abused: Not on file     Forced sexual activity: Not on file   Other Topics Concern     Service Not Asked    Blood Transfusions Not Asked    Caffeine Concern Not Asked    Occupational Exposure Not Asked   Lisa Priestly Hazards Not Asked    Sleep Concern Not Asked    Stress Concern Not Asked    Weight Concern Not Asked    Special Diet Not Asked    Back Care Not Asked    Exercise Not Asked    Bike Helmet Not Asked   [address] Not Asked    Self-Exams Not Asked   Social History Narrative    Not on file       Past Surgical History:   Procedure Laterality Date    FOOT/TOES SURGERY PROC UNLISTED      HX BACK SURGERY      HX HEENT Right 09/2018    eye surgery, macular     HX KNEE REPLACEMENT Left     HX ORTHOPAEDIC  06-25-12    Right foot with excision of bursa and adipose tissue from fifth metatarsal base by Dr. Lenore Terry      lower back (1992 and 2000)    HX OTHER SURGICAL      left foot (2008)    HX OTHER SURGICAL      Retina repair     HX PROSTATECTOMY  11/2018    HX ROTATOR CUFF REPAIR Right 01/28/2019    by Dr. Rose Garrido             * Patient was identified by name and date of birth   * Agreement on procedure being performed was verified  * Risks and Benefits explained to the patient  * Procedure site verified and marked as necessary  * Patient was positioned for comfort  * Consent was signed and verified  11:05 AM    The patient was instructed on post injection care. We did see Mr. Mary Duran today in the office for followup with regards to his left hip and his left knee. The patient had his left knee replaced in March 2018. He has done well. He has been worked up for infection in the past.  Fortunately, this has been negative. He does have some intermittent discomfort of his left knee. He denies any fevers or chills and no injuries. With regards to his hip, he does have a history of trochanteric bursitis. He had a cortisone injection about three months ago, which worked quite well for him. It wore off about one week ago. He has laterally-based discomfort but no groin pain and no thigh pain. PHYSICAL EXAMINATION:  In general, the patient is alert and oriented x 3 in no acute distress. The patient is well-developed, well-nourished, with a normal affect. The patient is afebrile. HEENT:  Head is normocephalic and atraumatic. Pupils are equally round and reactive to light and accommodation. Extraocular eye movements are intact. Neck is supple. Trachea is midline. No JVD is present. Breathing is nonlabored. Examination of the lower extremities reveals pain-free range of motion of the hips.   He does have pain to palpation of the greater trochanteric bursa on the right side. There is negative straight leg raise. There is negative calf tenderness. There is negative Giorgio's. There is no evidence of DVT present. The left knee reveals the skin is intact. There is no erythema, ecchymosis, and no warmth. He does have a mild effusion but negative patellar ballottement. Range of motion is full. There is good stability. The patella tracks nicely. He does have a little tenderness to palpation to the I.T. band laterally. RADIOGRAPHS:  Radiographs in the office today, February 7, 2020, at the [facility] location, AP, lateral, and skyline, show the total knee components to be well-fixed with no evidence for loosening or fracture noted. ASSESSMENT:      1. Status post left knee replacement. 2. Synovitis left knee. 3. I.T. band tenderness left knee. 4. Right hip trochanteric bursitis. PLAN:  At this point, we are going to move forward with basic labs, CBC, ESR, CRP, IL-6, and uric acid. I expect these will come back as negative. I would like him to get back on his Celebrex if this is okay with his family doctor. Today, in the office, we are going to move forward with cortisone injection for the right hip. After informed and written consent with an appropriate time out performed, and under sterile conditions, with ultrasound-guided assistance, the right hip was prepped with Betadine and 6 mg of Celestone was injected without complications. The patient tolerated the injection well. The patient was instructed on post injection care. We will see him back in the office in a couple of weeks after the labs are done.               JR Willie GATES, ESPERANZA, ATC

## 2020-03-01 ENCOUNTER — HOSPITAL ENCOUNTER (EMERGENCY)
Age: 65
Discharge: HOME | End: 2020-03-01
Attending: EMERGENCY MEDICINE
Payer: MEDICARE

## 2020-03-01 ENCOUNTER — APPOINTMENT (OUTPATIENT)
Dept: GENERAL RADIOLOGY | Age: 65
End: 2020-03-01
Attending: PHYSICIAN ASSISTANT
Payer: MEDICARE

## 2020-03-01 VITALS
HEART RATE: 84 BPM | HEIGHT: 70 IN | BODY MASS INDEX: 32.21 KG/M2 | RESPIRATION RATE: 16 BRPM | WEIGHT: 225 LBS | TEMPERATURE: 99.3 F | OXYGEN SATURATION: 96 % | DIASTOLIC BLOOD PRESSURE: 72 MMHG | SYSTOLIC BLOOD PRESSURE: 138 MMHG

## 2020-03-01 DIAGNOSIS — J11.1 INFLUENZA: Primary | ICD-10-CM

## 2020-03-01 DIAGNOSIS — J20.9 ACUTE BRONCHITIS, UNSPECIFIED ORGANISM: ICD-10-CM

## 2020-03-01 DIAGNOSIS — E87.6 HYPOKALEMIA: ICD-10-CM

## 2020-03-01 LAB
ALBUMIN SERPL-MCNC: 2.6 G/DL (ref 3.4–5)
ALBUMIN/GLOB SERPL: 0.7 {RATIO} (ref 0.8–1.7)
ALP SERPL-CCNC: 73 U/L (ref 45–117)
ALT SERPL-CCNC: 23 U/L (ref 16–61)
ANION GAP SERPL CALC-SCNC: 11 MMOL/L (ref 3–18)
AST SERPL-CCNC: 19 U/L (ref 10–38)
BASOPHILS # BLD: 0 K/UL (ref 0–0.06)
BASOPHILS NFR BLD: 0 % (ref 0–3)
BILIRUB SERPL-MCNC: 0.3 MG/DL (ref 0.2–1)
BNP SERPL-MCNC: 154 PG/ML (ref 0–900)
BUN SERPL-MCNC: 15 MG/DL (ref 7–18)
BUN/CREAT SERPL: 15 (ref 12–20)
CALCIUM SERPL-MCNC: 8.8 MG/DL (ref 8.5–10.1)
CHLORIDE SERPL-SCNC: 102 MMOL/L (ref 100–111)
CO2 SERPL-SCNC: 25 MMOL/L (ref 21–32)
CREAT SERPL-MCNC: 0.98 MG/DL (ref 0.6–1.3)
DIFFERENTIAL METHOD BLD: ABNORMAL
EOSINOPHIL # BLD: 0.3 K/UL (ref 0–0.4)
EOSINOPHIL NFR BLD: 2 % (ref 0–5)
ERYTHROCYTE [DISTWIDTH] IN BLOOD BY AUTOMATED COUNT: 12.4 % (ref 11.6–14.5)
GLOBULIN SER CALC-MCNC: 4 G/DL (ref 2–4)
GLUCOSE SERPL-MCNC: 114 MG/DL (ref 74–99)
HCT VFR BLD AUTO: 33.7 % (ref 36–48)
HGB BLD-MCNC: 11.2 G/DL (ref 13–16)
LYMPHOCYTES # BLD: 1.3 K/UL (ref 0.8–3.5)
LYMPHOCYTES NFR BLD: 9 % (ref 20–51)
MCH RBC QN AUTO: 32.1 PG (ref 24–34)
MCHC RBC AUTO-ENTMCNC: 33.2 G/DL (ref 31–37)
MCV RBC AUTO: 96.6 FL (ref 74–97)
MONOCYTES # BLD: 2 K/UL (ref 0–1)
MONOCYTES NFR BLD: 14 % (ref 2–9)
NEUTS SEG # BLD: 10.6 K/UL (ref 1.8–8)
NEUTS SEG NFR BLD: 75 % (ref 42–75)
PLATELET # BLD AUTO: 235 K/UL (ref 135–420)
PLATELET COMMENTS,PCOM: ABNORMAL
PMV BLD AUTO: 10.5 FL (ref 9.2–11.8)
POTASSIUM SERPL-SCNC: 3.3 MMOL/L (ref 3.5–5.5)
PROT SERPL-MCNC: 6.6 G/DL (ref 6.4–8.2)
RBC # BLD AUTO: 3.49 M/UL (ref 4.7–5.5)
RBC MORPH BLD: ABNORMAL
SODIUM SERPL-SCNC: 138 MMOL/L (ref 136–145)
WBC # BLD AUTO: 14.2 K/UL (ref 4.6–13.2)

## 2020-03-01 PROCEDURE — 85025 COMPLETE CBC W/AUTO DIFF WBC: CPT

## 2020-03-01 PROCEDURE — 83880 ASSAY OF NATRIURETIC PEPTIDE: CPT

## 2020-03-01 PROCEDURE — 80053 COMPREHEN METABOLIC PANEL: CPT

## 2020-03-01 PROCEDURE — 71046 X-RAY EXAM CHEST 2 VIEWS: CPT

## 2020-03-01 PROCEDURE — 74011250637 HC RX REV CODE- 250/637: Performed by: PHYSICIAN ASSISTANT

## 2020-03-01 PROCEDURE — 99283 EMERGENCY DEPT VISIT LOW MDM: CPT

## 2020-03-01 PROCEDURE — 94640 AIRWAY INHALATION TREATMENT: CPT

## 2020-03-01 PROCEDURE — 99284 EMERGENCY DEPT VISIT MOD MDM: CPT

## 2020-03-01 RX ORDER — HYDROCODONE POLISTIREX AND CHLORPHENIRAMINE POLISTIREX 10; 8 MG/5ML; MG/5ML
5 SUSPENSION, EXTENDED RELEASE ORAL
Qty: 50 ML | Refills: 0 | Status: SHIPPED | OUTPATIENT
Start: 2020-03-01 | End: 2020-03-06

## 2020-03-01 RX ORDER — HYDROCODONE POLISTIREX AND CHLORPHENIRAMINE POLISTIREX 10; 8 MG/5ML; MG/5ML
5 SUSPENSION, EXTENDED RELEASE ORAL ONCE
Status: COMPLETED | OUTPATIENT
Start: 2020-03-01 | End: 2020-03-01

## 2020-03-01 RX ORDER — POTASSIUM CHLORIDE 20 MEQ/1
40 TABLET, EXTENDED RELEASE ORAL
Status: COMPLETED | OUTPATIENT
Start: 2020-03-01 | End: 2020-03-01

## 2020-03-01 RX ORDER — DOXYCYCLINE 100 MG/1
100 CAPSULE ORAL 2 TIMES DAILY
Qty: 14 CAP | Refills: 0 | Status: SHIPPED | OUTPATIENT
Start: 2020-03-01 | End: 2020-03-08

## 2020-03-01 RX ORDER — POTASSIUM CHLORIDE 20 MEQ/1
20 TABLET, EXTENDED RELEASE ORAL 3 TIMES DAILY
Qty: 9 TAB | Refills: 0 | Status: SHIPPED | OUTPATIENT
Start: 2020-03-01 | End: 2020-03-04

## 2020-03-01 RX ORDER — PROMETHAZINE HYDROCHLORIDE AND PHENYLEPHRINE HYDROCHLORIDE 6.25; 5 MG/5ML; MG/5ML
5 SYRUP ORAL
COMMUNITY
End: 2020-12-04

## 2020-03-01 RX ORDER — ALBUTEROL SULFATE 90 UG/1
2 AEROSOL, METERED RESPIRATORY (INHALATION)
Qty: 1 INHALER | Refills: 0 | Status: SHIPPED | OUTPATIENT
Start: 2020-03-01 | End: 2020-12-04

## 2020-03-01 RX ADMIN — HYDROCODONE POLISTIREX AND CHLORPHENIRAMINE POLISTIREX 5 ML: 10; 8 SUSPENSION, EXTENDED RELEASE ORAL at 11:40

## 2020-03-01 RX ADMIN — POTASSIUM CHLORIDE 40 MEQ: 1500 TABLET, EXTENDED RELEASE ORAL at 14:08

## 2020-03-01 NOTE — DISCHARGE INSTRUCTIONS
Patient Education     Please return immediately to the Emergency Room for re-evaluation if you are not improving, develop any new symptoms, or develop worsening of current symptoms! Patient Education        Hypokalemia: Care Instructions  Your Care Instructions    Hypokalemia (say \"zb-[id number]-CATE-lucretia-[id number]\") is a low level of potassium. The heart, muscles, kidneys, and nervous system all need potassium to work well. This problem has many different causes. Kidney problems, diet, and medicines like diuretics and laxatives can cause it. So can vomiting or diarrhea. In some cases, cancer is the cause. Your doctor may do tests to find the cause of your low potassium levels. You may need medicines to bring your potassium levels back to normal. You may also need regular blood tests to check your potassium. If you have very low potassium, you may need intravenous (IV) medicines. You also may need tests to check the electrical activity of your heart. Heart problems caused by low potassium levels can be very serious. Follow-up care is a key part of your treatment and safety. Be sure to make and go to all appointments, and call your doctor if you are having problems. It's also a good idea to know your test results and keep a list of the medicines you take. How can you care for yourself at home? · If your doctor recommends it, eat foods that have a lot of potassium. These include fresh fruits, juices, and vegetables. They also include nuts, beans, and milk. · Be safe with medicines. If your doctor prescribes medicines or potassium supplements, take them exactly as directed. Call your doctor if you have any problems with your medicines. · Get your potassium levels tested as often as your doctor tells you. When should you call for help? Call 911 anytime you think you may need emergency care. For example, call if:    · You feel like your heart is missing beats.  Heart problems caused by low potassium can cause death.     · You passed out (lost consciousness).     · You have a seizure.    Call your doctor now or seek immediate medical care if:    · You feel weak or unusually tired.     · You have severe arm or leg cramps.     · You have tingling or numbness.     · You feel sick to your stomach, or you vomit.     · You have belly cramps.     · You feel bloated or constipated.     · You have to urinate a lot.     · You feel very thirsty most of the time.     · You are dizzy or lightheaded, or you feel like you may faint.     · You feel depressed, or you lose touch with reality.    Watch closely for changes in your health, and be sure to contact your doctor if:    · You do not get better as expected. Where can you learn more? Go to http://rivka-jarrell.info/. Enter G358 in the search box to learn more about \"Hypokalemia: Care Instructions. \"  Current as of: November 6, 2018  Content Version: 12.2  © 3956-9917 [employer]. Care instructions adapted under license by [employer] (which disclaims liability or warranty for this information). If you have questions about a medical condition or this instruction, always ask your healthcare professional. [address] any warranty or liability for your use of this information. If you have been prescribed a medication and are unable to take this medication for any reason, please return to the Emergency Department for further evaluation! If you have been referred for follow-up to a specialist, but are unable to follow-up and your symptoms are either not improving or are worsening, please return to the Emergency Department for further evaluation! Influenza (Flu): Care Instructions  Your Care Instructions    Influenza (flu) is an infection in the lungs and breathing passages. It is caused by the influenza virus. There are different strains, or types, of the flu virus from year to year.  Unlike the common cold, the flu comes on suddenly and the symptoms, such as a cough, congestion, fever, chills, fatigue, aches, and pains, are more severe. These symptoms may last up to 10 days. Although the flu can make you feel very sick, it usually doesn't cause serious health problems. Home treatment is usually all you need for flu symptoms. But your doctor may prescribe antiviral medicine to prevent other health problems, such as pneumonia, from developing. Older people and those who have a long-term health condition, such as lung disease, are most at risk for having pneumonia or other health problems. Follow-up care is a key part of your treatment and safety. Be sure to make and go to all appointments, and call your doctor if you are having problems. It's also a good idea to know your test results and keep a list of the medicines you take. How can you care for yourself at home? · Get plenty of rest.  · Drink plenty of fluids, enough so that your urine is light yellow or clear like water. If you have kidney, heart, or liver disease and have to limit fluids, talk with your doctor before you increase the amount of fluids you drink. · Take an over-the-counter pain medicine if needed, such as acetaminophen (Tylenol), ibuprofen (Advil, Motrin), or naproxen (Aleve), to relieve fever, headache, and muscle aches. Read and follow all instructions on the label. No one younger than 20 should take aspirin. It has been linked to Reye syndrome, a serious illness. · Do not smoke. Smoking can make the flu worse. If you need help quitting, talk to your doctor about stop-smoking programs and medicines. These can increase your chances of quitting for good. · Breathe moist air from a hot shower or from a sink filled with hot water to help clear a stuffy nose. · Before you use cough and cold medicines, check the label. These medicines may not be safe for young children or for people with certain health problems.   · If the skin around your nose and lips becomes sore, put some petroleum jelly on the area. · To ease coughing:  ? Drink fluids to soothe a scratchy throat. ? Suck on cough drops or plain hard candy. ? Take an over-the-counter cough medicine that contains dextromethorphan to help you get some sleep. Read and follow all instructions on the label. ? Raise your head at night with an extra pillow. This may help you rest if coughing keeps you awake. · Take any prescribed medicine exactly as directed. Call your doctor if you think you are having a problem with your medicine. To avoid spreading the flu  · Wash your hands regularly, and keep your hands away from your face. · Stay home from school, work, and other public places until you are feeling better and your fever has been gone for at least 24 hours. The fever needs to have gone away on its own without the help of medicine. · Ask people living with you to talk to their doctors about preventing the flu. They may get antiviral medicine to keep from getting the flu from you. · To prevent the flu in the future, get a flu vaccine every fall. Encourage people living with you to get the vaccine. · Cover your mouth when you cough or sneeze. When should you call for help? Call 911 anytime you think you may need emergency care. For example, call if:    · You have severe trouble breathing.    Call your doctor now or seek immediate medical care if:    · You have new or worse trouble breathing.     · You seem to be getting much sicker.     · You feel very sleepy or confused.     · You have a new or higher fever.     · You get a new rash.    Watch closely for changes in your health, and be sure to contact your doctor if:    · You begin to get better and then get worse.     · You are not getting better after 1 week. Where can you learn more? Go to http://rivka-jarrell.info/. Enter L630 in the search box to learn more about \"Influenza (Flu): Care Instructions. \"  Current as of: June 9, 2019  Content Version: 12.2  © 1364-8725 [employer]. Care instructions adapted under license by [employer] (which disclaims liability or warranty for this information). If you h  Patient Education        Bronchitis: Care Instructions  Your Care Instructions    Bronchitis is inflammation of the bronchial tubes, which carry air to the lungs. The tubes swell and produce mucus, or phlegm. The mucus and inflamed bronchial tubes make you cough. You may have trouble breathing. Most cases of bronchitis are caused by viruses like those that cause colds. Antibiotics usually do not help and they may be harmful. Bronchitis usually develops rapidly and lasts about 2 to 3 weeks in otherwise healthy people. Follow-up care is a key part of your treatment and safety. Be sure to make and go to all appointments, and call your doctor if you are having problems. It's also a good idea to know your test results and keep a list of the medicines you take. How can you care for yourself at home? · Take all medicines exactly as prescribed. Call your doctor if you think you are having a problem with your medicine. · Get some extra rest.  · Take an over-the-counter pain medicine, such as acetaminophen (Tylenol), ibuprofen (Advil, Motrin), or naproxen (Aleve) to reduce fever and relieve body aches. Read and follow all instructions on the label. · Do not take two or more pain medicines at the same time unless the doctor told you to. Many pain medicines have acetaminophen, which is Tylenol. Too much acetaminophen (Tylenol) can be harmful. · Take an over-the-counter cough medicine that contains dextromethorphan to help quiet a dry, hacking cough so that you can sleep. Avoid cough medicines that have more than one active ingredient. Read and follow all instructions on the label. · Breathe moist air from a humidifier, hot shower, or sink filled with hot water.  The heat and moisture will thin mucus so you can cough it out.  · Do not smoke. Smoking can make bronchitis worse. If you need help quitting, talk to your doctor about stop-smoking programs and medicines. These can increase your chances of quitting for good. When should you call for help? Call 911 anytime you think you may need emergency care. For example, call if:    · You have severe trouble breathing.    Call your doctor now or seek immediate medical care if:    · You have new or worse trouble breathing.     · You cough up dark brown or bloody mucus (sputum).     · You have a new or higher fever.     · You have a new rash.    Watch closely for changes in your health, and be sure to contact your doctor if:    · You cough more deeply or more often, especially if you notice more mucus or a change in the color of your mucus.     · You are not getting better as expected. Where can you learn more? Go to http://rivka-jarrell.info/. Enter H333 in the search box to learn more about \"Bronchitis: Care Instructions. \"  Current as of: June 9, 2019  Content Version: 12.2  © 6427-1499 [employer]. Care instructions adapted under license by [employer] (which disclaims liability or warranty for this information). If you have questions about a medical condition or this instruction, always ask your healthcare professional. [address] any warranty or liability for your use of this information. ave questions about a medical condition or this instruction, always ask your healthcare professional. [address] any warranty or liability for your use of this information.

## 2020-03-01 NOTE — ED TRIAGE NOTES
Patient states that he began feeling ill last Monday with flu-like symptoms. He states being evaluated at PCP office on Thursday. States negative flu test at PCP office. Received prescriptions for cough medication, Tamiflu, and Z-khushi. States beginning medications prescribed on Friday. Patient c/o continued fever and congested cough. States 101F temp yesterday. Denies taking any tylenol since 0100 this morning.

## 2020-03-01 NOTE — ED PROVIDER NOTES
EMERGENCY DEPARTMENT HISTORY AND PHYSICAL EXAM    12:18 PM      Date: 3/1/2020  Patient Name: Shanon Rubio    History of Presenting Illness     Chief Complaint   Patient presents with    Cough    Fever    Rib Pain       History Provided By: Patient    Chief Complaint: cough, fever, chills, rib pain, abdominal muscle pain, sore throat  Duration: 6 Days  Timing:  Acute  Location:   Quality: Aching  Severity: Moderate  Modifying Factors:   Associated Symptoms: denies any other associated signs or symptoms      Additional History (Context):Reji Yu is a 75 y.o. male with a pertinent history of hypertension, GERD, DVT on chronic AC with coumadin, hyperlipidemia who presents to the emergency department for evaluation of worsening cough, fever, chills, and rib pain over the past 2 days. Patient states symptoms began with congestion, sore throat, cough, and chills last Monday. He reports he was seen by a doctor 3 days ago and was prescribed Tamiflu, azithromycin, and a cough suppressant. He states cough is worsening. Pt reports exposure to multiple ill family members. He admits to nausea without vomiting or diarrhea. Abdominal muscles are sore near his ribs. He states he is concerned about PNA. PCP:  Monica Rosario MD      Current Facility-Administered Medications   Medication Dose Route Frequency Provider Last Rate Last Dose    potassium chloride (K-DUR, KLOR-CON) SR tablet 40 mEq  40 mEq Oral NOW Thania Gilbert PA-C         Current Outpatient Medications   Medication Sig Dispense Refill    promethazine-phenylephrine (PROMETHAZINE VC) 6.25-5 mg/5 mL syrup Take 5 mL by mouth every six (6) hours as needed for Nausea or Cough.  chlorpheniramine-HYDROcodone (TUSSIONEX PENNKINETIC ER) 10-8 mg/5 mL suspension Take 5 mL by mouth every twelve (12) hours as needed for Cough for up to 5 days. Max Daily Amount: 10 mL.  50 mL 0    doxycycline (MONODOX) 100 mg capsule Take 1 Cap by mouth two (2) times a day for 7 days. 14 Cap 0    potassium chloride (K-DUR, KLOR-CON) 20 mEq tablet Take 1 Tab by mouth three (3) times daily for 3 days. 9 Tab 0    albuterol (PROVENTIL HFA, VENTOLIN HFA, PROAIR HFA) 90 mcg/actuation inhaler Take 2 Puffs by inhalation every four (4) hours as needed for Wheezing. 1 Inhaler 0    warfarin (COUMADIN) 5 mg tablet TAKE 2 AND 1/2 TABLETS BY MOUTH DAILY OR AS DIRECTED 75 Tab 0    lisinopril-hydroCHLOROthiazide (PRINZIDE, ZESTORETIC) 20-12.5 mg per tablet TAKE 1 TABLET BY MOUTH DAILY 90 Tab 1    labetalol (NORMODYNE) 100 mg tablet TAKE 1 TABLET BY MOUTH TWICE DAILY. STOP VERAPAMIL 180 Tab 0    butalbital-acetaminophen-caff (FIORICET) -40 mg per capsule 2 cap three times per day as needed for headache 180 Cap 1    pantoprazole (PROTONIX) 40 mg tablet Take 1 Tab by mouth daily. 30 Tab 2    ALPRAZolam (XANAX) 0.5 mg tablet Take one half(1/2) tab to one(1) tab by mouth at bedtime as needed for sleep 30 Tab 0    diclofenac (VOLTAREN) 1 % gel Apply 4 g to affected area four (4) times daily. Maximum 16 grams per joint per day. Dispense 5 100 gram tubes 5 Each 0    montelukast (SINGULAIR) 10 mg tablet TAKE 1 TABLET BY MOUTH EVERY DAY (Patient taking differently: TAKE 1 TABLET BY MOUTH EVERY HS) 90 Tab 0    acetaminophen (TYLENOL) 500 mg tablet Take 1,000 mg by mouth every six (6) hours as needed for Pain.          Past History     Past Medical History:  Past Medical History:   Diagnosis Date    Arthritis     Bleeding     Chronic pain     knee and shoulder    GERD (gastroesophageal reflux disease)     Headache(784.0)     migraine    High cholesterol     Hypertension     Lower back pain 11/6/2010    Other chest pain     Pure hypercholesterolemia     Right buttock pain 11/6/2010    Right foot pain     Rotator cuff tear     left-since 2010, worsened tear Jan.    Rotator cuff tear, right     Spinal stenosis     Tendonitis, tibialis     anterior    Thromboembolus ([address] 3 after sx last one 2000       Past Surgical History:  Past Surgical History:   Procedure Laterality Date    FOOT/TOES SURGERY PROC UNLISTED      HX BACK SURGERY      HX HEENT Right 09/2018    eye surgery, macular     HX KNEE REPLACEMENT Left     HX ORTHOPAEDIC  06-25-12    Right foot with excision of bursa and adipose tissue from fifth metatarsal base by Dr. Michel Fall      lower back (1992 and 2000)    HX OTHER SURGICAL      left foot (2008)    HX OTHER SURGICAL      Retina repair     HX PROSTATECTOMY  11/2018    HX ROTATOR CUFF REPAIR Right 01/28/2019    by Dr. Karma Marie         Family History:  Family History   Problem Relation Age of Onset   Barbara Granger Arthritis-rheumatoid Mother     Dementia Mother     Heart Disease Father     Cancer Father     Hypertension Other     Cancer Brother        Social History:  Social History     Tobacco Use    Smoking status: Never Smoker    Smokeless tobacco: Never Used   Substance Use Topics    Alcohol use: No    Drug use: No       Allergies: Allergies   Allergen Reactions    Amoxicillin Itching    Augmentin [Amoxicillin-Pot Clavulanate] Itching    Chlorhexidine Towelette Itching    Hibiclens [Chlorhexidine Gluconate] Itching    Milk Containing Products Diarrhea    Nsaids (Non-Steroidal Anti-Inflammatory Drug) Other (comments)     On blood thinner, contraindicated.  Penicillins Rash    Requip [Ropinirole] Nausea and Vomiting         Review of Systems       Review of Systems   Constitutional: Positive for chills and fever. HENT: Positive for congestion, rhinorrhea and sore throat. Respiratory: Positive for cough. Negative for shortness of breath. Cardiovascular: Negative for chest pain. Gastrointestinal: Positive for abdominal pain and nausea. Negative for blood in stool, constipation, diarrhea and vomiting. Genitourinary: Negative for dysuria, frequency and hematuria.    Musculoskeletal: Positive for myalgias. Negative for back pain. Skin: Negative for rash and wound. Neurological: Negative for dizziness and headaches. All other systems reviewed and are negative. Physical Exam     Visit Vitals  /72 (BP 1 Location: Left arm, BP Patient Position: At rest)   Pulse 84   Temp 99.3 °F (37.4 °C)   Resp 16   Ht 5' 10\" (1.778 m)   Wt 102.1 kg (225 lb)   SpO2 96%   BMI 32.28 kg/m²       Physical Exam  Vitals signs and nursing note reviewed. Constitutional:       General: He is not in acute distress. Appearance: He is well-developed. He is not diaphoretic. HENT:      Head: Normocephalic and atraumatic. Nose: Congestion present. No rhinorrhea. Eyes:      Conjunctiva/sclera: Conjunctivae normal.   Neck:      Musculoskeletal: Normal range of motion and neck supple. Cardiovascular:      Rate and Rhythm: Normal rate and regular rhythm. Heart sounds: Normal heart sounds. No murmur. No friction rub. No gallop. Pulmonary:      Effort: Pulmonary effort is normal. No respiratory distress. Breath sounds: No stridor. Rales present. No wheezing or rhonchi. Comments: Wet cough. Bibasilar course rales  Chest:      Chest wall: No tenderness. Abdominal:      General: Bowel sounds are normal. There is no distension. Palpations: Abdomen is soft. Tenderness: There is no abdominal tenderness. There is no guarding or rebound. Musculoskeletal: Normal range of motion. General: No deformity. Right lower leg: No edema. Left lower leg: No edema. Comments: No peripheral edema   Skin:     General: Skin is warm and dry. Neurological:      Mental Status: He is alert and oriented to person, place, and time.          Diagnostic Study Results     Labs -  Recent Results (from the past 12 hour(s))   CBC WITH AUTOMATED DIFF    Collection Time: 03/01/20 11:44 AM   Result Value Ref Range    WBC 14.2 (H) 4.6 - 13.2 K/uL    RBC 3.49 (L) 4.70 - 5.50 M/uL    HGB 11.2 (L) 13.0 - 16.0 g/dL    HCT 33.7 (L) 36.0 - 48.0 %    MCV 96.6 74.0 - 97.0 FL    MCH 32.1 24.0 - 34.0 PG    MCHC 33.2 31.0 - 37.0 g/dL    RDW 12.4 11.6 - 14.5 %    PLATELET 942 917 - 273 K/uL    MPV 10.5 9.2 - 11.8 FL    NEUTROPHILS 75 42 - 75 %    LYMPHOCYTES 9 (L) 20 - 51 %    MONOCYTES 14 (H) 2 - 9 %    EOSINOPHILS 2 0 - 5 %    BASOPHILS 0 0 - 3 %    ABS. NEUTROPHILS 10.6 (H) 1.8 - 8.0 K/UL    ABS. LYMPHOCYTES 1.3 0.8 - 3.5 K/UL    ABS. MONOCYTES 2.0 (H) 0 - 1.0 K/UL    ABS. EOSINOPHILS 0.3 0.0 - 0.4 K/UL    ABS. BASOPHILS 0.0 0.0 - 0.06 K/UL    DF MANUAL      PLATELET COMMENTS ADEQUATE PLATELETS      RBC COMMENTS NORMOCYTIC, NORMOCHROMIC     METABOLIC PANEL, COMPREHENSIVE    Collection Time: 03/01/20 11:44 AM   Result Value Ref Range    Sodium 138 136 - 145 mmol/L    Potassium 3.3 (L) 3.5 - 5.5 mmol/L    Chloride 102 100 - 111 mmol/L    CO2 25 21 - 32 mmol/L    Anion gap 11 3.0 - 18 mmol/L    Glucose 114 (H) 74 - 99 mg/dL    BUN 15 7.0 - 18 MG/DL    Creatinine 0.98 0.6 - 1.3 MG/DL    BUN/Creatinine ratio 15 12 - 20      GFR est AA >60 >60 ml/min/1.73m2    GFR est non-AA >60 >60 ml/min/1.73m2    Calcium 8.8 8.5 - 10.1 MG/DL    Bilirubin, total 0.3 0.2 - 1.0 MG/DL    ALT (SGPT) 23 16 - 61 U/L    AST (SGOT) 19 10 - 38 U/L    Alk. phosphatase 73 45 - 117 U/L    Protein, total 6.6 6.4 - 8.2 g/dL    Albumin 2.6 (L) 3.4 - 5.0 g/dL    Globulin 4.0 2.0 - 4.0 g/dL    A-G Ratio 0.7 (L) 0.8 - 1.7     NT-PRO BNP    Collection Time: 03/01/20 11:44 AM   Result Value Ref Range    NT pro- 0 - 900 PG/ML       Radiologic Studies -   No results found. Medical Decision Making   I am the first provider for this patient. I reviewed the vital signs, available nursing notes, past medical history, past surgical history, family history and social history. Vital Signs-Reviewed the patient's vital signs.     Pulse Oximetry Analysis -  96% on room air (Interpretation)    Records Reviewed: Nursing Notes and Old Medical Records (Time of Review: 12:18 PM)    ED Course: Progress Notes, Reevaluation, and Consults:    Provider Notes (Medical Decision Making):   differential diagnosis:  PNA, acute bronchitis, influenza, other viral URI, CHF    Plan: Pt presents ambulatory in NAD, normal vitals. Exam with bibasilar rales and wet cough. Peribronchial cuffing noted without clear consolidation, pending radiology read. Minimal leukocytosis with shift. Low K - replaced orally here. Otherwise unremarkable work-up. Will DC home with doxycycline, albuterol MDI, PO potassium, and tussionex. At this time, patient is stable and appropriate for discharge home. Patient demonstrates understanding of current diagnoses and is in agreement with the treatment plan. They are advised that while the likelihood of serious underlying condition is low at this point given the evaluation performed today, we cannot fully rule it out. They are advised to immediately return with any new symptoms or worsening of current condition. All questions have been answered. Patient is given educational material regarding their diagnoses, including danger symptoms and when to return to the ED. Diagnosis     Clinical Impression:   1. Influenza    2. Acute bronchitis, unspecified organism    3. Hypokalemia        Disposition: DC Home    Follow-up Information     Follow up With Specialties Details Why Contact Info    Amy Issa MD Internal Medicine Call in 2 days  [address] EMERGENCY DEPT Emergency Medicine Go to As needed, If symptoms worsen [address] [id number] [phone]           Patient's Medications   Start Taking    ALBUTEROL (PROVENTIL HFA, VENTOLIN HFA, PROAIR HFA) 90 MCG/ACTUATION INHALER    Take 2 Puffs by inhalation every four (4) hours as needed for Wheezing.     CHLORPHENIRAMINE-HYDROCODONE (TUSSIONEX PENNKINETIC ER) 10-8 MG/5 ML SUSPENSION    Take 5 mL by mouth every twelve (12) hours as needed for Cough for up to 5 days. Max Daily Amount: 10 mL. DOXYCYCLINE (MONODOX) 100 MG CAPSULE    Take 1 Cap by mouth two (2) times a day for 7 days. POTASSIUM CHLORIDE (K-DUR, KLOR-CON) 20 MEQ TABLET    Take 1 Tab by mouth three (3) times daily for 3 days. Continue Taking    ACETAMINOPHEN (TYLENOL) 500 MG TABLET    Take 1,000 mg by mouth every six (6) hours as needed for Pain. ALPRAZOLAM (XANAX) 0.5 MG TABLET    Take one half(1/2) tab to one(1) tab by mouth at bedtime as needed for sleep    BUTALBITAL-ACETAMINOPHEN-CAFF (FIORICET) -40 MG PER CAPSULE    2 cap three times per day as needed for headache    DICLOFENAC (VOLTAREN) 1 % GEL    Apply 4 g to affected area four (4) times daily. Maximum 16 grams per joint per day. Dispense 5 100 gram tubes    LABETALOL (NORMODYNE) 100 MG TABLET    TAKE 1 TABLET BY MOUTH TWICE DAILY. STOP VERAPAMIL    LISINOPRIL-HYDROCHLOROTHIAZIDE (PRINZIDE, ZESTORETIC) 20-12.5 MG PER TABLET    TAKE 1 TABLET BY MOUTH DAILY    MONTELUKAST (SINGULAIR) 10 MG TABLET    TAKE 1 TABLET BY MOUTH EVERY DAY    PANTOPRAZOLE (PROTONIX) 40 MG TABLET    Take 1 Tab by mouth daily. PROMETHAZINE-PHENYLEPHRINE (PROMETHAZINE VC) 6.25-5 MG/5 ML SYRUP    Take 5 mL by mouth every six (6) hours as needed for Nausea or Cough. WARFARIN (COUMADIN) 5 MG TABLET    TAKE 2 AND 1/2 TABLETS BY MOUTH DAILY OR AS DIRECTED   These Medications have changed    No medications on file   Stop Taking    BENZONATATE (TESSALON) 200 MG CAPSULE    1 cap three times per day as needed for cough    DICLOFENAC (VOLTAREN) 1 % GEL    Apply 4 g to affected area four (4) times daily.     FAMOTIDINE (PEPCID) 20 MG TABLET    1 tab twice per day    LANSOPRAZOLE (PREVACID) 30 MG CAPSULE    1 cap each AM    METHYLPREDNISOLONE (MEDROL DOSEPACK) 4 MG TABLET    Per dose pack instructions    MONTELUKAST (SINGULAIR) 10 MG TABLET    TAKE 1 TABLET BY MOUTH EVERY DAY    WARFARIN (COUMADIN) 3 MG TABLET As directed to make 10 mg on Mon, Wed, Friday, 8 mg on the other days     _______________________________    This note was dictated utilizing voice recognition software which may lead to typographical errors. I apologize in advance if the situation occurs. If questions arise please do not hesitate to contact me or call our department.   Paresh Ayala PA-C

## 2020-03-01 NOTE — ED NOTES
Current Discharge Medication List      START taking these medications    Details   chlorpheniramine-HYDROcodone (TUSSIONEX PENNKINETIC ER) 10-8 mg/5 mL suspension Take 5 mL by mouth every twelve (12) hours as needed for Cough for up to 5 days. Max Daily Amount: 10 mL. Qty: 50 mL, Refills: 0    Associated Diagnoses: Acute bronchitis, unspecified organism      doxycycline (MONODOX) 100 mg capsule Take 1 Cap by mouth two (2) times a day for 7 days. Qty: 14 Cap, Refills: 0      potassium chloride (K-DUR, KLOR-CON) 20 mEq tablet Take 1 Tab by mouth three (3) times daily for 3 days. Qty: 9 Tab, Refills: 0      albuterol (PROVENTIL HFA, VENTOLIN HFA, PROAIR HFA) 90 mcg/actuation inhaler Take 2 Puffs by inhalation every four (4) hours as needed for Wheezing. Qty: 1 Inhaler, Refills: 0         CONTINUE these medications which have NOT CHANGED    Details   promethazine-phenylephrine (PROMETHAZINE VC) 6.25-5 mg/5 mL syrup Take 5 mL by mouth every six (6) hours as needed for Nausea or Cough. warfarin (COUMADIN) 5 mg tablet TAKE 2 AND 1/2 TABLETS BY MOUTH DAILY OR AS DIRECTED  Qty: 75 Tab, Refills: 0      lisinopril-hydroCHLOROthiazide (PRINZIDE, ZESTORETIC) 20-12.5 mg per tablet TAKE 1 TABLET BY MOUTH DAILY  Qty: 90 Tab, Refills: 1      labetalol (NORMODYNE) 100 mg tablet TAKE 1 TABLET BY MOUTH TWICE DAILY. STOP VERAPAMIL  Qty: 180 Tab, Refills: 0      butalbital-acetaminophen-caff (FIORICET) -40 mg per capsule 2 cap three times per day as needed for headache  Qty: 180 Cap, Refills: 1      pantoprazole (PROTONIX) 40 mg tablet Take 1 Tab by mouth daily. Qty: 30 Tab, Refills: 2      ALPRAZolam (XANAX) 0.5 mg tablet Take one half(1/2) tab to one(1) tab by mouth at bedtime as needed for sleep  Qty: 30 Tab, Refills: 0    Associated Diagnoses: Primary insomnia      diclofenac (VOLTAREN) 1 % gel Apply 4 g to affected area four (4) times daily. Maximum 16 grams per joint per day.  Dispense 5 100 gram tubes  Qty: 5 Each, Refills: 0    Associated Diagnoses: Iliotibial band tendinitis of left side      montelukast (SINGULAIR) 10 mg tablet TAKE 1 TABLET BY MOUTH EVERY DAY  Qty: 90 Tab, Refills: 0      acetaminophen (TYLENOL) 500 mg tablet Take 1,000 mg by mouth every six (6) hours as needed for Pain. Prescription reviewed and given to patient.    Patient armband removed and shredded

## 2020-03-02 NOTE — PROGRESS NOTES
Called patient and informed him of chest x-ray reading indicating acute bronchitis with superimposed left basilar patchy infiltrate. Advised to continue antibiotics as previously prescribed. All questions answered.

## 2020-03-05 NOTE — PROGRESS NOTES
[facility]  [address] [place], [address]  Phone: [phone]  Fax: [phone]    Pt's YOB: 1955    ASSESSMENT   Diagnoses and all orders for this visit:    1. Displacement of lumbar intervertebral disc without myelopathy    2. Lumbago-sciatica due to displacement of lumbar intervertebral disc    3. Sacrum sprain, subsequent encounter    4. Neuropathy  -     gabapentin (NEURONTIN) 100 mg capsule; Take 1 cap by mouth at night for 1 week, then increase to 2 as directed. 5. Facet arthropathy, lumbar    6. Spinal stenosis of lumbar region without neurogenic claudication    7. Chronic anticoagulation    8. History of total left knee replacement (TKR)         IMPRESSION AND PLAN:  George Pichardo is a 75 y.o. male with history of lumbar pain. He denies any significant change in symptoms. He reports continued stiffness in the left knee and is followed by CARA Padgett. Pt admits to increased lower back pain for about 1 week with his cough. He takes Skelaxin 800 mg intermittently as needed and did not use the Medrol Dosepak prescribed at his last office visit. 1) Pt was given information on lumbar arthritis exercises. 2) He is not a candidate for NSAID's due to anticoagulation with Coumadin.    3) Pt will continue taking Skelaxin 800 mg as prescribed and does not need a refill at this time. 4) He may take his previously prescribed Medrol Dosepak if needed. 5) Pt was prescribed Neurontin 100 mg 2 caps QHS, tapering up as directed. 6) Mr. Judy Velez has a reminder for a \"due or due soon\" health maintenance. I have asked that he contact his primary care provider, Yajaira Bell MD, for follow-up on this health maintenance. 7)  demonstrated consistency with prescribing. Follow-up and Dispositions    · Return in about 4 months (around 7/9/2020) for Medication follow up.              HISTORY OF PRESENT ILLNESS:  Gallito Davis Lucina Pantoja is a 75 y.o. male with history of lumbar pain and presents to the office today for follow up. He denies any significant change in symptoms since his last office visit. He reports continued stiffness in the left knee and he recently had lab workup as ordered by CARA Lopez. Pt notes that he has been out of work for a couple weeks for bronchitis and pneumonia. He states that when he followed up with his PCP, he was told he tested negative for influenza but at a later ER visit, he was told he did have influenza. He hopes to return to work this week and notes that he continues to experience a cough. Pt admits to increased lower back pain for about 1 week with his cough. He also reports some soreness in the ribs from his cough. Pt notes swelling in both legs/feet and was told this could be related to his recent inactivity. He takes Skelaxin 800 mg intermittently as needed. Of note, he is currently on Coumadin and his INR levels are followed by Rboert Magaña MD. Pt notes that he never took Neurontin 300 mg since he was hesitant after reading about the side effects. He has not yet used the Medrol Dosepak prescribed at his last office visit.  Pt at this time desires to proceed with medication evaluation and would like to try a lower dosage of the medication first.    Pain Scale: 4/10    PCP: Robert Magaña MD     Past Medical History:   Diagnosis Date    Arthritis     Bleeding     Chronic pain     knee and shoulder    GERD (gastroesophageal reflux disease)     Headache(784.0)     migraine    High cholesterol     Hypertension     Lower back pain 11/6/2010    Other chest pain     Pure hypercholesterolemia     Right buttock pain 11/6/2010    Right foot pain     Rotator cuff tear     left-since 2010, worsened tear Jan.    Rotator cuff tear, right     Spinal stenosis     Tendonitis, tibialis     anterior    Thromboembolus ([facility] [address]     3 after sx last one 2000        Social History Socioeconomic History    Marital status:      Spouse name: Not on file    Number of children: Not on file    Years of education: Not on file    Highest education level: Not on file   Occupational History    Not on file   Social Needs    Financial resource strain: Not on file    Food insecurity:     Worry: Not on file     Inability: Not on file    Transportation needs:     Medical: Not on file     Non-medical: Not on file   Tobacco Use    Smoking status: Never Smoker    Smokeless tobacco: Never Used   Substance and Sexual Activity    Alcohol use: No    Drug use: No    Sexual activity: Not Currently   Lifestyle    Physical activity:     Days per week: Not on file     Minutes per session: Not on file    Stress: Not on file   Relationships    Social connections:     Talks on phone: Not on file     Gets together: Not on file     Attends Religion service: Not on file     Active member of club or organization: Not on file     Attends meetings of clubs or organizations: Not on file     Relationship status: Not on file    Intimate partner violence:     Fear of current or ex partner: Not on file     Emotionally abused: Not on file     Physically abused: Not on file     Forced sexual activity: Not on file   Other Topics Concern     Service Not Asked    Blood Transfusions Not Asked    Caffeine Concern Not Asked    Occupational Exposure Not Asked   Ortiz Itz Hazards Not Asked    Sleep Concern Not Asked    Stress Concern Not Asked    Weight Concern Not Asked    Special Diet Not Asked    Back Care Not Asked    Exercise Not Asked    Bike Helmet Not Asked    Seat Belt Not Asked    Self-Exams Not Asked   Social History Narrative    Not on file       Current Outpatient Medications   Medication Sig Dispense Refill    gabapentin (NEURONTIN) 100 mg capsule Take 1 cap by mouth at night for 1 week, then increase to 2 as directed.  60 Cap 2    promethazine-phenylephrine (PROMETHAZINE VC) 6.25-5 mg/5 mL syrup Take 5 mL by mouth every six (6) hours as needed for Nausea or Cough.  albuterol (PROVENTIL HFA, VENTOLIN HFA, PROAIR HFA) 90 mcg/actuation inhaler Take 2 Puffs by inhalation every four (4) hours as needed for Wheezing. 1 Inhaler 0    warfarin (COUMADIN) 5 mg tablet TAKE 2 AND 1/2 TABLETS BY MOUTH DAILY OR AS DIRECTED (Patient taking differently: Take  by mouth daily. TAKE 2 AND 1/2 TABLETS BY MOUTH DAILY OR AS DIRECTED) 75 Tab 0    lisinopril-hydroCHLOROthiazide (PRINZIDE, ZESTORETIC) 20-12.5 mg per tablet TAKE 1 TABLET BY MOUTH DAILY (Patient taking differently: Take 1 Tab by mouth daily. TAKE 1 TABLET BY MOUTH DAILY) 90 Tab 1    labetalol (NORMODYNE) 100 mg tablet TAKE 1 TABLET BY MOUTH TWICE DAILY. STOP VERAPAMIL (Patient taking differently: Take 300 mg by mouth two (2) times a day. TAKE 1 TABLET BY MOUTH TWICE DAILY. STOP VERAPAMIL) 180 Tab 0    butalbital-acetaminophen-caff (FIORICET) -40 mg per capsule 2 cap three times per day as needed for headache (Patient taking differently: Take  by mouth daily. 2 cap three times per day as needed for headache) 180 Cap 1    pantoprazole (PROTONIX) 40 mg tablet Take 1 Tab by mouth daily. 30 Tab 2    ALPRAZolam (XANAX) 0.5 mg tablet Take one half(1/2) tab to one(1) tab by mouth at bedtime as needed for sleep (Patient taking differently: Take  by mouth nightly as needed. Take one half(1/2) tab to one(1) tab by mouth at bedtime as needed for sleep) 30 Tab 0    diclofenac (VOLTAREN) 1 % gel Apply 4 g to affected area four (4) times daily. Maximum 16 grams per joint per day. Dispense 5 100 gram tubes (Patient taking differently: Apply 4 g to affected area two (2) times a day. Maximum 16 grams per joint per day.  Dispense 5 100 gram tubes) 5 Each 0    montelukast (SINGULAIR) 10 mg tablet TAKE 1 TABLET BY MOUTH EVERY DAY (Patient taking differently: Take 10 mg by mouth daily.) 90 Tab 0    acetaminophen (TYLENOL) 500 mg tablet Take 1,000 mg by mouth every six (6) hours as needed for Pain. Allergies   Allergen Reactions    Amoxicillin Itching    Augmentin [Amoxicillin-Pot Clavulanate] Itching    Chlorhexidine Towelette Itching    Hibiclens [Chlorhexidine Gluconate] Itching    Milk Containing Products Diarrhea    Nsaids (Non-Steroidal Anti-Inflammatory Drug) Other (comments)     On blood thinner, contraindicated.  Penicillins Rash    Requip [Ropinirole] Nausea and Vomiting         REVIEW OF SYSTEMS    Constitutional: Negative for fever, chills, or weight change. Respiratory: Negative for cough or shortness of breath. Cardiovascular: Negative for chest pain or palpitations. Gastrointestinal: Negative for acid reflux, change in bowel habits, or constipation. Genitourinary: Negative for dysuria and flank pain. Musculoskeletal: Positive for lumbar pain. Skin: Negative for rash. Neurological: Negative for headaches, dizziness; positive for foot numbness. Endo/Heme/Allergies: Negative for increased bruising. Psychiatric/Behavioral: Negative for difficulty with sleep. PHYSICAL EXAMINATION  Visit Vitals  /79 (BP 1 Location: Right arm, BP Patient Position: Sitting)   Pulse 78   Temp 98 °F (36.7 °C) (Oral)   Ht 5' 10\" (1.778 m)   Wt 230 lb 3.2 oz (104.4 kg)   SpO2 92%   BMI 33.03 kg/m²       Constitutional: Awake, alert, and in no acute distress. Neurological: 1+ symmetrical DTRs in the upper extremities. 1+ symmetrical DTRs in the lower extremities. Sensation to light touch is intact. Negative Lepe's sign bilaterally. Skin: warm, dry, and intact. Musculoskeletal: Tenderness to palpation in the lower lumbar region. Moderate pain with extension and axial loading. No pain with internal or external rotation of his hips. Negative straight leg raise bilaterally.      Hip Flex  Quads Hamstrings Ankle DF EHL Ankle PF   Right +4/5 +4/5 +4/5 +4/5 +4/5 +4/5   Left +4/5 +4/5 +4/5 +4/5 +4/5 +4/5     IMAGING:    Lumbar spine MRI from 01/24/2018 was personally reviewed with the patient and demonstrated:  Results from [facility] on 01/24/18   MRI LUMB SPINE W WO CONT     Narrative MR lumbar spine with and without contrast    CPT CODE: 89506    HISTORY: Chronic and worsening low back pain with left lower extremity pain. Increasing weakness. Remote history of surgeries. No recent injury. COMPARISON: MRI January 2013. TECHNIQUE: Lumbar spine scanned with axial and sagittal T1W scans, axial and  sagittal T2W scans, and with post gadolinium axial and sagittal T1W scans. Contrast used: 10 cc Gadavist.    FINDINGS:     Prior laminotomies on the left at L4-5 and bilaterally at L5-S1. No fracture,  bone destruction, or fluid collection. Intact lordosis. Normal vertebral body heights. Small less than 5 mm low signal  focus within anterior L4, developed since 2013. No similar focus elsewhere. No  aggressive features. Otherwise generally fatty marrow signal with some chronic  fatty endplate changes straddling L5-S1. Moderate to severe disc space narrowing  and disc desiccation of that level. Mild to moderate narrowing and desiccation  of L3-4 and L4-5. Conus at T12-L1. Axial imaging correlation:    T12-L1:  Patent canal and foramina. L1-L2: Patent canal and foramina. L2-L3: Patent canal and foramina. L3-L4: Broad-based disc osteophyte complex. Bilateral facet arthropathy with  ligamentum flavum thickening and buckling. Moderate concentric spinal stenosis. Particular narrowing of lateral recesses with transient distortion of the  crossing L4 nerves. Axial T2 image 20. Patent foramina.  Progression of  degenerative findings. L4-L5: Postoperative level. Mild broad-based disc osteophyte complex with a  small amount of enhancing scar tissue. Hypertrophic facet arthropathy. Moderate  spinal stenosis. Narrowing of the lateral recesses left more than right.   Transient distortion of the crossing nerves particularly left L5. Axial T2 image  13. Mild foraminal stenoses.  Unchanged. L5-S1: Postoperative level. Broad-based disc osteophyte complex with enhancing  scar tissue centrally. Hypertrophic facet arthropathy. No significant spinal  stenosis. Moderately severe bilateral foraminal stenoses. Unchanged. Incidental imaging of regional soft tissues unremarkable.                  Impression IMPRESSION:    1. Some progression of degenerative disc and facet disease at L3-4, with  moderate spinal stenosis and potentially impingement of the crossing L4 nerves,  left more than right. 2. Stable postsurgical and degenerative findings at L4-5 and L5-S1.                   Cervical Spine MRI from 12/19/2016 demonstrated:  Results from Hospital Encounter on 12/19/16   MRI CERV SPINE WO CONT     Narrative MRI of cervical spine without contrast    HISTORY: Chronic progressive neck pain with headaches. COMPARISON: No prior MRI. Cervical spine radiographs from 12/1/2016. TECHNIQUE: T1 weighted, T2 FSE, FSE inversion recovery sagittal images are  supplemented by T2 weighted and GRE/medic axial images. FINDINGS: Normal alignment and vertebral body heights. Normal marrow signal  except for mild endplate degenerative change. Cervical cord is normal in signal  and caliber. Visualized posterior fossa contents look normal. Paraspinal soft  tissues look normal.    C2-3: No significant degenerative disc disease or spinal stenosis. C3-4: Small nonfocal disc protrusion which contacts the ventral cord and causes  borderline spinal stenosis. No foraminal stenosis. C4-5: No significant degenerative disc disease. Mildly hypertrophic facets. No  spinal stenosis. C5-6: Disc is narrowed with diffusely bulging disc and osteophyte complex. Facets are mildly hypertrophic. Mild spinal canal and moderate left foraminal  stenoses. C6-7: Disc is narrowed with diffusely bulging disc and osteophyte complex. Facets are mildly hypertrophic.  Mild spinal canal and moderate bilateral  foraminal stenoses. C7-T1: No significant degenerative disc disease or spinal stenosis.               Impression Impression:    Disc osteophyte complexes causing mild spinal canal stenoses at C5-6 and C6-7. Moderate left foraminal stenosis at C5-6 and moderate bilateral foraminal  stenoses at C6-7.        Written by Paras Collazo, as dictated by Karlo Delcid MD.  I, Dr. Karlo Delcid confirm that all documentation is accurate.

## 2020-03-09 ENCOUNTER — OFFICE VISIT (OUTPATIENT)
Dept: ORTHOPEDIC SURGERY | Age: 65
End: 2020-03-09

## 2020-03-09 VITALS
DIASTOLIC BLOOD PRESSURE: 79 MMHG | TEMPERATURE: 98 F | HEART RATE: 78 BPM | OXYGEN SATURATION: 92 % | SYSTOLIC BLOOD PRESSURE: 132 MMHG | WEIGHT: 230.2 LBS | HEIGHT: 70 IN | BODY MASS INDEX: 32.96 KG/M2

## 2020-03-09 DIAGNOSIS — M51.26 DISPLACEMENT OF LUMBAR INTERVERTEBRAL DISC WITHOUT MYELOPATHY: Primary | ICD-10-CM

## 2020-03-09 DIAGNOSIS — Z96.652 HISTORY OF TOTAL LEFT KNEE REPLACEMENT (TKR): ICD-10-CM

## 2020-03-09 DIAGNOSIS — M51.27 LUMBAGO-SCIATICA DUE TO DISPLACEMENT OF LUMBAR INTERVERTEBRAL DISC: ICD-10-CM

## 2020-03-09 DIAGNOSIS — Z79.01 CHRONIC ANTICOAGULATION: ICD-10-CM

## 2020-03-09 DIAGNOSIS — M48.061 SPINAL STENOSIS OF LUMBAR REGION WITHOUT NEUROGENIC CLAUDICATION: ICD-10-CM

## 2020-03-09 DIAGNOSIS — M47.816 FACET ARTHROPATHY, LUMBAR: ICD-10-CM

## 2020-03-09 DIAGNOSIS — S33.8XXD SACRUM SPRAIN, SUBSEQUENT ENCOUNTER: ICD-10-CM

## 2020-03-09 DIAGNOSIS — G62.9 NEUROPATHY: ICD-10-CM

## 2020-03-09 RX ORDER — GABAPENTIN 100 MG/1
CAPSULE ORAL
Qty: 60 CAP | Refills: 2 | Status: SHIPPED | OUTPATIENT
Start: 2020-03-09 | End: 2020-12-04

## 2020-03-09 NOTE — PATIENT INSTRUCTIONS
Low Back Arthritis: Exercises  Introduction  Here are some examples of typical rehabilitation exercises for your condition. Start each exercise slowly. Ease off the exercise if you start to have pain. Your doctor or physical therapist will tell you when you can start these exercises and which ones will work best for you. When you are not being active, find a comfortable position for rest. Some people are comfortable on the floor or a medium-firm bed with a small pillow under their head and another under their knees. Some people prefer to lie on their side with a pillow between their knees. Don't stay in one position for too long. Take short walks (10 to 20 minutes) every 2 to 3 hours. Avoid slopes, hills, and stairs until you feel better. Walk only distances you can manage without pain, especially leg pain. How to do the exercises  Pelvic tilt    1. Lie on your back with your knees bent. 2. \"Brace\" your stomach--tighten your muscles by pulling in and imagining your belly button moving toward your spine. 3. Press your lower back into the floor. You should feel your hips and pelvis rock back. 4. Hold for 6 seconds while breathing smoothly. 5. Relax and allow your pelvis and hips to rock forward. 6. Repeat 8 to 12 times. Back stretches    1. Get down on your hands and knees on the floor. 2. Relax your head and allow it to droop. Round your back up toward the ceiling until you feel a nice stretch in your upper, middle, and lower back. Hold this stretch for as long as it feels comfortable, or about 15 to 30 seconds. 3. Return to the starting position with a flat back while you are on your hands and knees. 4. Let your back sway by pressing your stomach toward the floor. Lift your buttocks toward the ceiling. 5. Hold this position for 15 to 30 seconds. 6. Repeat 2 to 4 times. Follow-up care is a key part of your treatment and safety.  Be sure to make and go to all appointments, and call your doctor if you are having problems. It's also a good idea to know your test results and keep a list of the medicines you take. Where can you learn more? Go to http://rivka-jarrell.info/. Enter V794 in the search box to learn more about \"Low Back Arthritis: Exercises. \"  Current as of: June 26, 2019  Content Version: 12.2  © 5288-5064 [employer]. Care instructions adapted under license by [employer] (which disclaims liability or warranty for this information). If you have questions about a medical condition or this instruction, always ask your healthcare professional. [place] [address] any warranty or liability for your use of this information.

## 2020-03-09 NOTE — LETTER
3/10/20 Patient: Rehana Purdy YOB: 1955 Date of Visit: 3/9/2020 Sofia Lawson MD 
[address] [address] VIA Facsimile: [phone] Dear Sofia Lawson MD, Thank you for referring Mr. Stevie Song to [facility] [facility] for evaluation. My notes for this consultation are attached. If you have questions, please do not hesitate to call me. I look forward to following your patient along with you. Sincerely, Fauzia Ramos MD

## 2020-04-01 DIAGNOSIS — M76.32 ILIOTIBIAL BAND TENDINITIS OF LEFT SIDE: ICD-10-CM

## 2020-04-01 RX ORDER — DICLOFENAC SODIUM 10 MG/G
GEL TOPICAL
Qty: 100 G | Refills: 3 | Status: SHIPPED | OUTPATIENT
Start: 2020-04-01 | End: 2020-07-28

## 2020-04-11 ENCOUNTER — TELEPHONE (OUTPATIENT)
Dept: ORTHOPEDIC SURGERY | Age: 65
End: 2020-04-11

## 2020-04-11 RX ORDER — SULFAMETHOXAZOLE AND TRIMETHOPRIM 800; 160 MG/1; MG/1
1 TABLET ORAL 2 TIMES DAILY
Qty: 20 TAB | Refills: 0 | Status: SHIPPED | OUTPATIENT
Start: 2020-04-11 | End: 2020-04-21

## 2020-04-13 ENCOUNTER — TELEPHONE (OUTPATIENT)
Dept: ORTHOPEDIC SURGERY | Age: 65
End: 2020-04-13

## 2020-04-13 RX ORDER — CLINDAMYCIN HYDROCHLORIDE 300 MG/1
300 CAPSULE ORAL 3 TIMES DAILY
Qty: 30 CAP | Refills: 0 | Status: SHIPPED | OUTPATIENT
Start: 2020-04-13 | End: 2020-10-14 | Stop reason: ALTCHOICE

## 2020-04-13 NOTE — TELEPHONE ENCOUNTER
Patient wife ( on hipaa )called and said she had called on Saturday due to the patient is having some Redness on his leg. Mrs. Katie Sue said that they prescribed the patient some Antibiotics , but they told them to call this morning to see when  or Nanci Mustafa would be able to see the patient at the office. Mrs. Margarita Sanchez is requesting a call back as soon as possible at tel. [phone]. Note : patient was last seen on 2/7/20 for the left knee by  Nanci Mustafa.

## 2020-04-13 NOTE — TELEPHONE ENCOUNTER
rx for clindamycin sent to pharmacy    Will plan on seeing patient in office on Wednesday if necessary. Please call to schedule an appt. For the patient.

## 2020-04-14 NOTE — TELEPHONE ENCOUNTER
Yes, take both abx. Can see if they are able to do VV. If so, set it up for Thursday.   If not, make appt to see me in the office on Thursday around 9:30

## 2020-04-14 NOTE — TELEPHONE ENCOUNTER
Patient's wife Suzanne Santos advised he is to take both antibiotics. She is scheduling a VV with Pastora Angelo for Thursday.

## 2020-04-14 NOTE — TELEPHONE ENCOUNTER
Patient's wife Lian Collins (on HIPPA) called stating that she was unsure if her  is to take both antibiotics Clindamycin and Bactrim together. I also saw that the patient is to come into the office but was unsure when to schedule the patient.  Please advise Lian Collins at [phone]

## 2020-07-06 ENCOUNTER — OFFICE VISIT (OUTPATIENT)
Dept: ORTHOPEDIC SURGERY | Age: 65
End: 2020-07-06

## 2020-07-06 VITALS
TEMPERATURE: 97.6 F | HEIGHT: 70 IN | WEIGHT: 236.6 LBS | HEART RATE: 74 BPM | RESPIRATION RATE: 16 BRPM | SYSTOLIC BLOOD PRESSURE: 138 MMHG | DIASTOLIC BLOOD PRESSURE: 80 MMHG | BODY MASS INDEX: 33.87 KG/M2 | OXYGEN SATURATION: 98 %

## 2020-07-06 DIAGNOSIS — M47.816 FACET ARTHROPATHY, LUMBAR: ICD-10-CM

## 2020-07-06 DIAGNOSIS — M48.061 SPINAL STENOSIS OF LUMBAR REGION WITHOUT NEUROGENIC CLAUDICATION: ICD-10-CM

## 2020-07-06 DIAGNOSIS — M51.27 LUMBAGO-SCIATICA DUE TO DISPLACEMENT OF LUMBAR INTERVERTEBRAL DISC: ICD-10-CM

## 2020-07-06 DIAGNOSIS — R26.9 GAIT ABNORMALITY: ICD-10-CM

## 2020-07-06 DIAGNOSIS — M51.26 DISPLACEMENT OF LUMBAR INTERVERTEBRAL DISC WITHOUT MYELOPATHY: Primary | ICD-10-CM

## 2020-07-06 DIAGNOSIS — M62.838 MUSCLE SPASM: ICD-10-CM

## 2020-07-06 DIAGNOSIS — G62.9 NEUROPATHY: ICD-10-CM

## 2020-07-06 RX ORDER — METAXALONE 800 MG/1
TABLET ORAL
Qty: 60 TAB | Refills: 1 | Status: SHIPPED | OUTPATIENT
Start: 2020-07-06 | End: 2020-11-13 | Stop reason: SDUPTHER

## 2020-07-06 RX ORDER — METHYLPREDNISOLONE 4 MG/1
TABLET ORAL
Qty: 1 DOSE PACK | Refills: 0 | Status: SHIPPED | OUTPATIENT
Start: 2020-07-06 | End: 2020-10-14 | Stop reason: ALTCHOICE

## 2020-07-06 NOTE — PATIENT INSTRUCTIONS
Low Back Arthritis: Exercises  Introduction  Here are some examples of typical rehabilitation exercises for your condition. Start each exercise slowly. Ease off the exercise if you start to have pain. Your doctor or physical therapist will tell you when you can start these exercises and which ones will work best for you. When you are not being active, find a comfortable position for rest. Some people are comfortable on the floor or a medium-firm bed with a small pillow under their head and another under their knees. Some people prefer to lie on their side with a pillow between their knees. Don't stay in one position for too long. Take short walks (10 to 20 minutes) every 2 to 3 hours. Avoid slopes, hills, and stairs until you feel better. Walk only distances you can manage without pain, especially leg pain. How to do the exercises  Pelvic tilt   1. Lie on your back with your knees bent. 2. \"Brace\" your stomach--tighten your muscles by pulling in and imagining your belly button moving toward your spine. 3. Press your lower back into the floor. You should feel your hips and pelvis rock back. 4. Hold for 6 seconds while breathing smoothly. 5. Relax and allow your pelvis and hips to rock forward. 6. Repeat 8 to 12 times. Back stretches   1. Get down on your hands and knees on the floor. 2. Relax your head and allow it to droop. Round your back up toward the ceiling until you feel a nice stretch in your upper, middle, and lower back. Hold this stretch for as long as it feels comfortable, or about 15 to 30 seconds. 3. Return to the starting position with a flat back while you are on your hands and knees. 4. Let your back sway by pressing your stomach toward the floor. Lift your buttocks toward the ceiling. 5. Hold this position for 15 to 30 seconds. 6. Repeat 2 to 4 times. Follow-up care is a key part of your treatment and safety.  Be sure to make and go to all appointments, and call your doctor if you are having problems. It's also a good idea to know your test results and keep a list of the medicines you take. Where can you learn more? Go to http://rivka-jarrell.info/  Enter T094 in the search box to learn more about \"Low Back Arthritis: Exercises. \"  Current as of: March 2, 2020               Content Version: 12.5  © 2950-2992 [employer], [employer]. Care instructions adapted under license by [employer] (which disclaims liability or warranty for this information). If you have questions about a medical condition or this instruction, always ask your healthcare professional. [place] [address] any warranty or liability for your use of this information.

## 2020-07-06 NOTE — LETTER
7/6/20 Patient: Taya Ariza YOB: 1955 Date of Visit: 7/6/2020 Galdino Trevino MD 
[address] [address] VIA Facsimile: [phone] Dear Galdino Trevino MD, Thank you for referring Mr. Jackelin Oates to [facility] for evaluation. My notes for this consultation are attached. If you have questions, please do not hesitate to call me. I look forward to following your patient along with you. Sincerely, Gila Amaya MD

## 2020-07-06 NOTE — PROGRESS NOTES
[facility]  [address] [place], [facility] [address]  Phone: [phone]  Fax: [phone]    Pt's YOB: 1955    ASSESSMENT   Diagnoses and all orders for this visit:    1. Displacement of lumbar intervertebral disc without myelopathy    2. Lumbago-sciatica due to displacement of lumbar intervertebral disc    3. Muscle spasm  -     metaxalone (Skelaxin) 800 mg tablet; Take 1 tab by mouth BID-TID prn muscle spasm    4. Neuropathy    5. Facet arthropathy, lumbar  -     methylPREDNISolone (MEDROL DOSEPACK) 4 mg tablet; Per dose pack instructions    6. Spinal stenosis of lumbar region without neurogenic claudication    7. Gait abnormality         IMPRESSION AND PLAN:  Pito Lundberg is a 77 y.o. male with history of lumbar pain. Pt complains of continued stiffness in the left knee. He admits to intermittent pain in his feet and reports that he does not take the Neurontin 100 mg regularly. Pt takes Skelaxin 800 mg intermittently as needed at night with benefit and requests a refill. 1) Pt was given information on lumbar arthritis exercises. 2) I encouraged the patient to take his Neurontin 100 mg 2 caps QHS regularly. 3) Pt is not a candidate for NSAID's due to anticoagulation with Coumadin. 4) He received a refill of Skelaxin 800 mg 1 tab BID-TID prn muscle spasm. 5) Pt was prescribed a Medrol Dosepak. 6) Mr. Marisol Giron has a reminder for a \"due or due soon\" health maintenance. I have asked that he contact his primary care provider, Jody Lance MD, for follow-up on this health maintenance. 7)  demonstrated consistency with prescribing. Follow-up and Dispositions    · Return in about 4 months (around 11/6/2020) for Medication follow up. HISTORY OF PRESENT ILLNESS:  Pito Lundberg is a 77 y.o. male with history of lumbar pain and presents to the office today for follow up.  Pt complains of continued stiffness in the left knee. He is followed by Aria Diallo, and notes that he had a left knee replacement in 03/2018. Pt reports some improvement in his knee pain since he has been more active. He uses Voltaren 1% gel on his left knee regularly. Pt states that he recently had a skin cancer (basal cell) removed from his face by his dermatologist, Dr. Talita Velazco. He admits to intermittent pain in his feet and reports that he does not take the Neurontin 100 mg regularly. He takes Skelaxin 800 mg intermittently as needed at night with benefit and requests a refill. Pt reports relief when taking a Medrol Dosepak in the past. He is currently on Coumadin and notes that his INR levels are regularly monitors by Dr. Aicha Simon. Pt at this time desires to continue with current care. Pt notes that he plans to retire within the next 2-3 years.      Pain Scale: 1/10    PCP: Elodia Rainey MD     Past Medical History:   Diagnosis Date    Arthritis     Bleeding     Chronic pain     knee and shoulder    GERD (gastroesophageal reflux disease)     Headache(784.0)     migraine    High cholesterol     Hypertension     Lower back pain 11/6/2010    Other chest pain     Pure hypercholesterolemia     Right buttock pain 11/6/2010    Right foot pain     Rotator cuff tear     left-since 2010, worsened tear Jan.    Rotator cuff tear, right     Spinal stenosis     Tendonitis, tibialis     anterior    Thromboembolus ([address]     3 after sx last one 2000        Social History     Socioeconomic History    Marital status:      Spouse name: Not on file    Number of children: Not on file    Years of education: Not on file    Highest education level: Not on file   Occupational History    Not on file   Social Needs    Financial resource strain: Not on file    Food insecurity     Worry: Not on file     Inability: Not on file    Transportation needs     Medical: Not on file     Non-medical: Not on file   Tobacco Use    Smoking status: Never Smoker    Smokeless tobacco: Never Used   Substance and Sexual Activity    Alcohol use: No    Drug use: No    Sexual activity: Not Currently   Lifestyle    Physical activity     Days per week: Not on file     Minutes per session: Not on file    Stress: Not on file   Relationships    Social connections     Talks on phone: Not on file     Gets together: Not on file     Attends Alevism service: Not on file     Active member of club or organization: Not on file     Attends meetings of clubs or organizations: Not on file     Relationship status: Not on file    Intimate partner violence     Fear of current or ex partner: Not on file     Emotionally abused: Not on file     Physically abused: Not on file     Forced sexual activity: Not on file   Other Topics Concern     Service Not Asked    Blood Transfusions Not Asked    Caffeine Concern Not Asked    Occupational Exposure Not Asked    Hobby Hazards Not Asked    Sleep Concern Not Asked    Stress Concern Not Asked    Weight Concern Not Asked    Special Diet Not Asked    Back Care Not Asked    Exercise Not Asked    Bike Helmet Not Asked   [address] Not Asked    Self-Exams Not Asked   Social History Narrative    Not on file       Current Outpatient Medications   Medication Sig Dispense Refill    metaxalone (Skelaxin) 800 mg tablet Take 1 tab by mouth BID-TID prn muscle spasm 60 Tab 1    methylPREDNISolone (MEDROL DOSEPACK) 4 mg tablet Per dose pack instructions 1 Dose Pack 0    clindamycin (CLEOCIN) 300 mg capsule Take 1 Cap by mouth three (3) times daily. 30 Cap 0    diclofenac (VOLTAREN) 1 % gel APPLY 4 GRAMS EXTERNALLY TO THE AFFECTED AREA FOUR TIMES DAILY 100 g 3    gabapentin (NEURONTIN) 100 mg capsule Take 1 cap by mouth at night for 1 week, then increase to 2 as directed. 60 Cap 2    promethazine-phenylephrine (PROMETHAZINE VC) 6.25-5 mg/5 mL syrup Take 5 mL by mouth every six (6) hours as needed for Nausea or Cough.       albuterol (PROVENTIL HFA, VENTOLIN HFA, PROAIR HFA) 90 mcg/actuation inhaler Take 2 Puffs by inhalation every four (4) hours as needed for Wheezing. 1 Inhaler 0    warfarin (COUMADIN) 5 mg tablet TAKE 2 AND 1/2 TABLETS BY MOUTH DAILY OR AS DIRECTED (Patient taking differently: Take  by mouth daily. TAKE 2 AND 1/2 TABLETS BY MOUTH DAILY OR AS DIRECTED) 75 Tab 0    lisinopril-hydroCHLOROthiazide (PRINZIDE, ZESTORETIC) 20-12.5 mg per tablet TAKE 1 TABLET BY MOUTH DAILY (Patient taking differently: Take 1 Tab by mouth daily. TAKE 1 TABLET BY MOUTH DAILY) 90 Tab 1    labetalol (NORMODYNE) 100 mg tablet TAKE 1 TABLET BY MOUTH TWICE DAILY. STOP VERAPAMIL (Patient taking differently: Take 300 mg by mouth two (2) times a day. TAKE 1 TABLET BY MOUTH TWICE DAILY. STOP VERAPAMIL) 180 Tab 0    butalbital-acetaminophen-caff (FIORICET) -40 mg per capsule 2 cap three times per day as needed for headache (Patient taking differently: Take  by mouth daily. 2 cap three times per day as needed for headache) 180 Cap 1    pantoprazole (PROTONIX) 40 mg tablet Take 1 Tab by mouth daily. 30 Tab 2    ALPRAZolam (XANAX) 0.5 mg tablet Take one half(1/2) tab to one(1) tab by mouth at bedtime as needed for sleep (Patient taking differently: Take  by mouth nightly as needed. Take one half(1/2) tab to one(1) tab by mouth at bedtime as needed for sleep) 30 Tab 0    montelukast (SINGULAIR) 10 mg tablet TAKE 1 TABLET BY MOUTH EVERY DAY (Patient taking differently: Take 10 mg by mouth daily.) 90 Tab 0    acetaminophen (TYLENOL) 500 mg tablet Take 1,000 mg by mouth every six (6) hours as needed for Pain.          Allergies   Allergen Reactions    Amoxicillin Itching    Augmentin [Amoxicillin-Pot Clavulanate] Itching    Chlorhexidine Towelette Itching    Hibiclens [Chlorhexidine Gluconate] Itching    Milk Containing Products Diarrhea    Nsaids (Non-Steroidal Anti-Inflammatory Drug) Other (comments)     On blood thinner, contraindicated.  Penicillins Rash    Requip [Ropinirole] Nausea and Vomiting         REVIEW OF SYSTEMS    Constitutional: Negative for fever, chills, or weight change. Respiratory: Negative for cough or shortness of breath. Cardiovascular: Negative for chest pain or palpitations. Gastrointestinal: Negative for acid reflux, change in bowel habits, or constipation. Genitourinary: Negative for dysuria and flank pain. Musculoskeletal: Positive for lumbar and left knee pain. Neurological: Negative for headaches, dizziness, or numbness. Endo/Heme/Allergies: Negative for increased bruising. Psychiatric/Behavioral: Negative for difficulty with sleep. As per HPI    PHYSICAL EXAMINATION  Visit Vitals  /80 (BP 1 Location: Right arm, BP Patient Position: Sitting)   Pulse 74   Temp 97.6 °F (36.4 °C) (Skin)   Resp 16   Ht 5' 10\" (1.778 m)   Wt 236 lb 9.6 oz (107.3 kg)   SpO2 98%   BMI 33.95 kg/m²       Constitutional: Awake, alert, and in no acute distress. Neurological: 1+ symmetrical DTRs in the upper extremities. 1+ symmetrical DTRs in the lower extremities. Sensation to light touch is intact. Negative Lepe's sign bilaterally. Skin: warm, dry, and intact. Musculoskeletal: Tenderness to palpation in the lower lumbar region. Moderate pain with extension and axial loading. No pain with internal or external rotation of his hips. Negative straight leg raise bilaterally. Pt ambulates with the assistance of a single point cane     Hip Flex  Quads Hamstrings Ankle DF EHL Ankle PF   Right +4/5 +4/5 +4/5 +4/5 +4/5 +4/5   Left +4/5 +4/5 +4/5 +4/5 +4/5 +4/5     IMAGING:    Lumbar spine MRI from 01/24/2018 was personally reviewed with the patient and demonstrated:  Results from [facility] on 01/24/18   MRI LUMB SPINE W WO CONT     Narrative MR lumbar spine with and without contrast    CPT CODE: 25810    HISTORY: Chronic and worsening low back pain with left lower extremity pain.   Increasing weakness. Remote history of surgeries. No recent injury. COMPARISON: MRI January 2013. TECHNIQUE: Lumbar spine scanned with axial and sagittal T1W scans, axial and  sagittal T2W scans, and with post gadolinium axial and sagittal T1W scans. Contrast used: 10 cc Gadavist.    FINDINGS:     Prior laminotomies on the left at L4-5 and bilaterally at L5-S1. No fracture,  bone destruction, or fluid collection. Intact lordosis. Normal vertebral body heights. Small less than 5 mm low signal  focus within anterior L4, developed since 2013. No similar focus elsewhere. No  aggressive features. Otherwise generally fatty marrow signal with some chronic  fatty endplate changes straddling L5-S1. Moderate to severe disc space narrowing  and disc desiccation of that level. Mild to moderate narrowing and desiccation  of L3-4 and L4-5. Conus at T12-L1. Axial imaging correlation:    T12-L1:  Patent canal and foramina. L1-L2: Patent canal and foramina. L2-L3: Patent canal and foramina. L3-L4: Broad-based disc osteophyte complex. Bilateral facet arthropathy with  ligamentum flavum thickening and buckling. Moderate concentric spinal stenosis. Particular narrowing of lateral recesses with transient distortion of the  crossing L4 nerves. Axial T2 image 20. Patent foramina.  Progression of  degenerative findings. L4-L5: Postoperative level. Mild broad-based disc osteophyte complex with a  small amount of enhancing scar tissue. Hypertrophic facet arthropathy. Moderate  spinal stenosis. Narrowing of the lateral recesses left more than right. Transient distortion of the crossing nerves particularly left L5. Axial T2 image  13. Mild foraminal stenoses.  Unchanged. L5-S1: Postoperative level. Broad-based disc osteophyte complex with enhancing  scar tissue centrally. Hypertrophic facet arthropathy. No significant spinal  stenosis. Moderately severe bilateral foraminal stenoses. Unchanged.     Incidental imaging of regional soft tissues unremarkable.                  Impression IMPRESSION:    1. Some progression of degenerative disc and facet disease at L3-4, with  moderate spinal stenosis and potentially impingement of the crossing L4 nerves,  left more than right. 2. Stable postsurgical and degenerative findings at L4-5 and L5-S1.                   Cervical Spine MRI from 12/19/2016 demonstrated:  Results from Hospital Encounter on 12/19/16   MRI CERV SPINE WO CONT     Narrative MRI of cervical spine without contrast    HISTORY: Chronic progressive neck pain with headaches. COMPARISON: No prior MRI. Cervical spine radiographs from 12/1/2016. TECHNIQUE: T1 weighted, T2 FSE, FSE inversion recovery sagittal images are  supplemented by T2 weighted and GRE/medic axial images. FINDINGS: Normal alignment and vertebral body heights. Normal marrow signal  except for mild endplate degenerative change. Cervical cord is normal in signal  and caliber. Visualized posterior fossa contents look normal. Paraspinal soft  tissues look normal.    C2-3: No significant degenerative disc disease or spinal stenosis. C3-4: Small nonfocal disc protrusion which contacts the ventral cord and causes  borderline spinal stenosis. No foraminal stenosis. C4-5: No significant degenerative disc disease. Mildly hypertrophic facets. No  spinal stenosis. C5-6: Disc is narrowed with diffusely bulging disc and osteophyte complex. Facets are mildly hypertrophic. Mild spinal canal and moderate left foraminal  stenoses. C6-7: Disc is narrowed with diffusely bulging disc and osteophyte complex. Facets are mildly hypertrophic. Mild spinal canal and moderate bilateral  foraminal stenoses. C7-T1: No significant degenerative disc disease or spinal stenosis.               Impression Impression:    Disc osteophyte complexes causing mild spinal canal stenoses at C5-6 and C6-7.     Moderate left foraminal stenosis at C5-6 and moderate bilateral foraminal  stenoses at C6-7.         Written by Natividad Awad, as dictated by Bing Randolph MD.  I, Dr. Bing Randolph confirm that all documentation is accurate.

## 2020-07-28 DIAGNOSIS — M76.32 ILIOTIBIAL BAND TENDINITIS OF LEFT SIDE: ICD-10-CM

## 2020-07-28 RX ORDER — DICLOFENAC SODIUM 10 MG/G
GEL TOPICAL
Qty: 100 G | Refills: 3 | Status: SHIPPED | OUTPATIENT
Start: 2020-07-28 | End: 2020-11-18 | Stop reason: SDUPTHER

## 2020-08-15 DIAGNOSIS — M62.838 MUSCLE SPASM: ICD-10-CM

## 2020-08-16 RX ORDER — CELECOXIB 200 MG/1
CAPSULE ORAL
Qty: 60 CAP | Refills: 2 | Status: SHIPPED | OUTPATIENT
Start: 2020-08-16 | End: 2021-02-17

## 2020-11-06 ENCOUNTER — OFFICE VISIT (OUTPATIENT)
Dept: ORTHOPEDIC SURGERY | Age: 65
End: 2020-11-06
Payer: OTHER MISCELLANEOUS

## 2020-11-06 VITALS
BODY MASS INDEX: 33.9 KG/M2 | HEIGHT: 70 IN | WEIGHT: 236.8 LBS | OXYGEN SATURATION: 93 % | TEMPERATURE: 96.7 F | SYSTOLIC BLOOD PRESSURE: 138 MMHG | HEART RATE: 73 BPM | DIASTOLIC BLOOD PRESSURE: 83 MMHG

## 2020-11-06 DIAGNOSIS — M25.512 ACUTE PAIN OF LEFT SHOULDER: ICD-10-CM

## 2020-11-06 DIAGNOSIS — S46.112A RUPTURE LONG HEAD BICEPS TENDON, LEFT, INITIAL ENCOUNTER: ICD-10-CM

## 2020-11-06 DIAGNOSIS — S46.112A RUPTURE LONG HEAD BICEPS TENDON, LEFT, INITIAL ENCOUNTER: Primary | ICD-10-CM

## 2020-11-06 PROCEDURE — 99203 OFFICE O/P NEW LOW 30 MIN: CPT | Performed by: SPECIALIST

## 2020-11-06 NOTE — LETTER
NOTIFICATION RETURN TO WORK 
 
11/6/2020 9:22 AM 
 
Mr. Garner Sever [address] [facility] [id number] To Whom It May Concern: 
 
Garner Sever is currently under the care of [address] [place] [address]. He will remain out of work until 11/12/2020. If there are questions or concerns please have the patient contact our office.  
 
 
 
Sincerely, 
 
 
Karina Pike MD

## 2020-11-06 NOTE — LETTER
11/13/2020 2:34 PM 
 
[facility] [address] [facility] [id number] THE ABOVE PATIENT IS TO REMAIN OUT OF WORK UNTIL SEEN 11-.  
 
 
Sincerely, 
 
 
 
Gordy De La Rosa MD

## 2020-11-06 NOTE — PROGRESS NOTES
Patient: Ravin Doan                MRN: [id number]       SSN: [id number]  YOB: 1955        AGE: 77 y.o. SEX: male    PCP: Thomas Rolon MD  11/06/20    Chief Complaint   Patient presents with    Shoulder Pain     left shoulder      HISTORY:  Ravin Doan is a 77 y.o. male who is seen for a work related left shoulder injury which occurred on 10/29/20. He heard and felt a painful pop in his left shoulder while lifting a heavy truck part out of his truck. He took all the weight of the part on his left arm since he did not want the expensive item to drop. He noted bruising of his left upper arm along with a biceps buldge deformity. He feels shoulder pain with arm motion and at night. He is right handed. He was referred to [facility] by worker's comp. His left arm is starting to feel better. He ruptured his right long head biceps tendon in 2000--treated successfully nonsurgically. He is s/p right RCR by Dr. Lacho Morales on 1/28/19. He is s/p back surgeries in 1994 and 2000. He takes Coumadin bilateral LE DVT which occurred following his back surgery. He has a h/o prostate cancer. Pain Assessment  11/6/2020   Location of Pain Shoulder   Location Modifiers Left   Severity of Pain 2   Quality of Pain Sharp   Quality of Pain Comment -   Duration of Pain (No Data)   Duration of Pain Comment varies   Frequency of Pain Intermittent   Date Pain First Started 10/29/2020   Aggravating Factors (No Data)   Aggravating Factors Comment certain shoulder movements   Limiting Behavior Yes   Relieving Factors (No Data)   Relieving Factors Comment tylenol   Result of Injury Yes   Work-Related Injury Yes   Type of Injury Other (Comment)   Type of Injury Comment lifting heavy auto part     Occupation, etc:  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 He lives in [place] with his wife.  He has 2 sons in the area. He is expecting his first grandchild. His daughter-in-law is a nurse in [place]. He is not diabetic. Mr. Stacia Murray weighs 236 lbs and is 5'10\" tall.        Lab Results   Component Value Date/Time    Hemoglobin A1c 5.8 (H) 01/31/2018 11:01 AM    Hemoglobin A1c (POC) 5.9 08/30/2018 05:23 PM     Weight Metrics 11/6/2020 10/14/2020 7/6/2020 3/9/2020 3/1/2020 2/7/2020 11/22/2019   Weight 236 lb 12.8 oz 236 lb 236 lb 9.6 oz 230 lb 3.2 oz 225 lb 232 lb 232 lb 6.4 oz   BMI 33.98 kg/m2 33.86 kg/m2 33.95 kg/m2 33.03 kg/m2 32.28 kg/m2 33.29 kg/m2 33.35 kg/m2       Patient Active Problem List   Diagnosis Code    Esophageal reflux K21.9    Pure hypercholesterolemia E78.00    Personal history of venous thrombosis and embolism Z86.718    Facet arthropathy, lumbar M47.816    DDD (degenerative disc disease), lumbar M51.36    Essential hypertension I10    Bronchitis J40    Chronic tension-type headache, not intractable G44.229    Lumbar spinal stenosis M48.061    Abdominal bloating R14.0    Myofascial pain M79.18    Cervical facet joint syndrome M47.812    Muscle spasm M62.838    Warfarin anticoagulation Z79.01    Muscle spasm of back M62.830    Primary osteoarthritis of left knee M17.12    Arthritis of knee M17.10    Prostate cancer ([facility] C61    Prostate CA ([facility] C61    Malignant neoplasm of prostate ([facility] [address] C61     REVIEW OF SYSTEMS:    Constitutional Symptoms: Negative   Eyes: Negative   Ears, Nose, Throat and Mouth: Negative   Cardiovascular: Negative   Respiratory: Negative   Genitourinary: Per HPI   Gastrointestinal: Per HPI   Integumentary (Skin and/or Breast): Negative   Musculoskeletal: Per HPI   Endocrine/Rheumatologic: Negative   Neurological: Per HPI   Hematology/Lymphatic: Negative    Allergic/Immunologic: Negative   Phychiatric: Negative    Social History     Socioeconomic History    Marital status:      Spouse name: Not on file    Number of children: Not on file    Years of education: Not on file    Highest education level: Not on file   Occupational History    Not on file   Social Needs    Financial resource strain: Not on file    Food insecurity     Worry: Not on file     Inability: Not on file    Transportation needs     Medical: Not on file     Non-medical: Not on file   Tobacco Use    Smoking status: Never Smoker    Smokeless tobacco: Never Used   Substance and Sexual Activity    Alcohol use: No    Drug use: No    Sexual activity: Not Currently   Lifestyle    Physical activity     Days per week: Not on file     Minutes per session: Not on file    Stress: Not on file   Relationships    Social connections     Talks on phone: Not on file     Gets together: Not on file     Attends Orthodoxy service: Not on file     Active member of club or organization: Not on file     Attends meetings of clubs or organizations: Not on file     Relationship status: Not on file    Intimate partner violence     Fear of current or ex partner: Not on file     Emotionally abused: Not on file     Physically abused: Not on file     Forced sexual activity: Not on file   Other Topics Concern     Service Not Asked    Blood Transfusions Not Asked    Caffeine Concern Not Asked    Occupational Exposure Not Asked   [place] Hazards Not Asked    Sleep Concern Not Asked    Stress Concern Not Asked    Weight Concern Not Asked    Special Diet Not Asked    Back Care Not Asked    Exercise Not Asked    Bike Helmet Not Asked    Seat Belt Not Asked    Self-Exams Not Asked   Social History Narrative    Not on file      Allergies   Allergen Reactions    Amoxicillin Itching    Augmentin [Amoxicillin-Pot Clavulanate] Itching    Chlorhexidine Towelette Itching    Hibiclens [Chlorhexidine Gluconate] Itching    Milk Containing Products Diarrhea    Nsaids (Non-Steroidal Anti-Inflammatory Drug) Other (comments)     On blood thinner, contraindicated.      Penicillins Rash    Requip [Ropinirole] Nausea and Vomiting      Current Outpatient Medications   Medication Sig    lansoprazole (PREVACID) 30 mg capsule     celecoxib (CELEBREX) 200 mg capsule TAKE ONE CAPSULE BY MOUTH TWICE DAILY.  diclofenac (VOLTAREN) 1 % gel APPLY 4 GRAMS EXTERNALLY TO THE AFFECTED AREA FOUR TIMES DAILY    metaxalone (Skelaxin) 800 mg tablet Take 1 tab by mouth BID-TID prn muscle spasm    warfarin (COUMADIN) 5 mg tablet TAKE 2 AND 1/2 TABLETS BY MOUTH DAILY OR AS DIRECTED (Patient taking differently: Take  by mouth daily. TAKE 2 AND 1/2 TABLETS BY MOUTH DAILY OR AS DIRECTED)    lisinopril-hydroCHLOROthiazide (PRINZIDE, ZESTORETIC) 20-12.5 mg per tablet TAKE 1 TABLET BY MOUTH DAILY (Patient taking differently: Take 1 Tab by mouth daily. TAKE 1 TABLET BY MOUTH DAILY)    labetalol (NORMODYNE) 100 mg tablet TAKE 1 TABLET BY MOUTH TWICE DAILY. STOP VERAPAMIL (Patient taking differently: Take 300 mg by mouth two (2) times a day. TAKE 1 TABLET BY MOUTH TWICE DAILY. STOP VERAPAMIL)    butalbital-acetaminophen-caff (FIORICET) -40 mg per capsule 2 cap three times per day as needed for headache (Patient taking differently: Take  by mouth daily. 2 cap three times per day as needed for headache)    ALPRAZolam (XANAX) 0.5 mg tablet Take one half(1/2) tab to one(1) tab by mouth at bedtime as needed for sleep (Patient taking differently: Take  by mouth nightly as needed. Take one half(1/2) tab to one(1) tab by mouth at bedtime as needed for sleep)    montelukast (SINGULAIR) 10 mg tablet TAKE 1 TABLET BY MOUTH EVERY DAY (Patient taking differently: Take 10 mg by mouth daily.)    acetaminophen (TYLENOL) 500 mg tablet Take 1,000 mg by mouth every six (6) hours as needed for Pain.  gabapentin (NEURONTIN) 100 mg capsule Take 1 cap by mouth at night for 1 week, then increase to 2 as directed.     promethazine-phenylephrine (PROMETHAZINE VC) 6.25-5 mg/5 mL syrup Take 5 mL by mouth every six (6) hours as needed for Nausea or Cough.  albuterol (PROVENTIL HFA, VENTOLIN HFA, PROAIR HFA) 90 mcg/actuation inhaler Take 2 Puffs by inhalation every four (4) hours as needed for Wheezing.  pantoprazole (PROTONIX) 40 mg tablet Take 1 Tab by mouth daily. No current facility-administered medications for this visit. PHYSICAL EXAMINATION:  Visit Vitals  /83 (BP 1 Location: Right arm, BP Patient Position: Sitting)   Pulse 73   Temp (!) 96.7 °F (35.9 °C) (Temporal)   Ht 5' 10\" (1.778 m)   Wt 236 lb 12.8 oz (107.4 kg)   SpO2 93%   BMI 33.98 kg/m²      ORTHO EXAMINATION:  Examination Right shoulder Left shoulder   Skin Intact Intact, large ecchymosis over biceps bulge deformity   Effusion - -   Biceps deformity + +   Atrophy - -   AC joint tenderness - -   Acromial tenderness - + mild anterior   Biceps tenderness - -   Forward flexion/Elevation  160   Active abduction  160   External rotation ROM 30 15   Internal rotation ROM 90 85   Apprehension - -   Impingement - -   Drop Arm Test - -   Neurovascular Intact Intact      RADIOGRAPHS:  XR LEFT SHOULDER 10/29/20 [facility]  IMPRESSION: There is DJD. No fracture    XR LEFT HUMERUS 10/29/20 PT FIRST  IMPRESSION: Within normal limits. XR LEFT ELBOW 5/2/19 [facility]  Left elbow:  No acute osseous findings. See details above. XR LEFT SHOULDER 5/2/19 [facility]  IMPRESSION:  No definite acute findings. Mild cortical irregularity along the undersurface of  the acromion, equivocal for subtle fracture. Clinical correlation advised. IMPRESSION:      ICD-10-CM ICD-9-CM    1. Rupture long head biceps tendon, left, initial encounter  S46.112A 840.8 XR INJ SHOULDER LT PRE CT/MRI ARTHRO      MRI SHOULDER LT W CONT   2. Acute pain of left shoulder  M25.512 719.41      PLAN: Work note provided--return to work on 11/13/20. Left shoulder MR arthrogram ordered. He will follow up PRN with Dr. Chris Black for orthopedic shoulder surgery consultation.       Scribed by Karina Pike MD ([address]) as dictated by Garrett Grijalva MD

## 2020-11-06 NOTE — PATIENT INSTRUCTIONS
Rivaroxaban (Xarelto, Xarelto Starter Pack) - (By mouth)   Why this medicine is used:   Treats and prevents blood clots. Contact a nurse or doctor right away if you have:  · Sudden or severe headache  · Back pain, numbness, tingling, weakness in your legs or feet  · Loss of bladder or bowel control  · Bloody vomit or vomit that looks like coffee grounds; bloody or black, tarry stools  · Bleeding that does not stop or bruises that do not heal     Common side effects:  · Minor bleeding or bruising  © 2017 [facility] Information is for End User's use only and may not be sold, redistributed or otherwise used for commercial purposes. Rivaroxaban (Xarelto, Xarelto Starter Pack) - (By mouth)   Why this medicine is used:   Treats and prevents blood clots. Contact a nurse or doctor right away if you have:  · Sudden or severe headache  · Back pain, numbness, tingling, weakness in your legs or feet  · Loss of bladder or bowel control  · Bloody vomit or vomit that looks like coffee grounds; bloody or black, tarry stools  · Bleeding that does not stop or bruises that do not heal     Common side effects:  · Minor bleeding or bruising  © 2017 [facility] Information is for End User's use only and may not be sold, redistributed or otherwise used for commercial purposes.

## 2020-11-09 ENCOUNTER — TELEPHONE (OUTPATIENT)
Dept: ORTHOPEDIC SURGERY | Age: 65
End: 2020-11-09

## 2020-11-09 NOTE — TELEPHONE ENCOUNTER
Called patients  [facility] to see if we were authorized to get patient in anywhere, he stated that he advised the patient on Friday 11/06/20 that if he got the order faxed to him he would find the patient a sooner appt. I have faxed arthrogram to Duane at [phone]. Duane stated that he is going to go through [facility].

## 2020-11-09 NOTE — TELEPHONE ENCOUNTER
W/C patient    Patients wife, Taya Rizo, called with concerns regarding scheduling an MRI and possible surgery for her . They were contacted to schedule MRI, however, first available at [facility] is for 11/20. Patients wife said they were told by provider that the surgery is time-sensitive and she feels that waiting until 11/20 for MRI is going to be too late. Do they need to try to have MRI done sooner elsewhere? Please advise patient or Taya Rizo at [phone].

## 2020-11-10 ENCOUNTER — TELEPHONE (OUTPATIENT)
Dept: ORTHOPEDIC SURGERY | Age: 65
End: 2020-11-10

## 2020-11-10 DIAGNOSIS — S46.112A RUPTURE LONG HEAD BICEPS TENDON, LEFT, INITIAL ENCOUNTER: ICD-10-CM

## 2020-11-10 DIAGNOSIS — M25.512 ACUTE PAIN OF LEFT SHOULDER: ICD-10-CM

## 2020-11-10 DIAGNOSIS — S46.112A RUPTURE LONG HEAD BICEPS TENDON, LEFT, INITIAL ENCOUNTER: Primary | ICD-10-CM

## 2020-11-10 NOTE — TELEPHONE ENCOUNTER
Isidra from MRI needs clarification for arthrogram versus contrast, as on prescription for MRI it says both. If it is an arthrogram it will be done on the 16th. She is asking if this is ok.   If it is just MRI with contrast he can be seen on 11/12.      [phone]  Ext [phone]  Fax [phone]

## 2020-11-10 NOTE — TELEPHONE ENCOUNTER
New order placed for Shoulder MRI w/ Contrast and faxed to [facility] at [phone]. confirmation received.

## 2020-11-10 NOTE — TELEPHONE ENCOUNTER
Fax received from [facility] stating MRI of the Left Shoulder is scheduled on 11/12/20 at 1:15PM. Fax sent to scanning.

## 2020-11-11 ENCOUNTER — TELEPHONE (OUTPATIENT)
Dept: ORTHOPEDIC SURGERY | Age: 65
End: 2020-11-11

## 2020-11-11 NOTE — TELEPHONE ENCOUNTER
Pt's wife called stating due to the type of MRI that has been ordered, pt has to be off of the Coumadin for 5 days. Pt does not want to be off of his Coumadin. Is there another type of MRI or test that can be ordered. He tentatively has the MRI scheduled for Monday 11/16. Please call pt at [phone].

## 2020-11-12 DIAGNOSIS — M25.512 ACUTE PAIN OF LEFT SHOULDER: ICD-10-CM

## 2020-11-12 DIAGNOSIS — S46.112A RUPTURE LONG HEAD BICEPS TENDON, LEFT, INITIAL ENCOUNTER: ICD-10-CM

## 2020-11-13 DIAGNOSIS — M62.838 MUSCLE SPASM: ICD-10-CM

## 2020-11-13 RX ORDER — METAXALONE 800 MG/1
TABLET ORAL
Qty: 60 TAB | Refills: 1 | Status: SHIPPED | OUTPATIENT
Start: 2020-11-13 | End: 2021-04-27 | Stop reason: SDUPTHER

## 2020-11-13 NOTE — TELEPHONE ENCOUNTER
Last Visit: 7/6/20 with MD Yonny Dawkins  Next Appointment: 12/8/20 with MD Yonny Dawkins  Previous Refill Encounter(s): 7/6/20 #60 with 1 refill    Requested Prescriptions     Pending Prescriptions Disp Refills    metaxalone (Skelaxin) 800 mg tablet 60 Tab 1     Sig: Take 1 tab by mouth BID-TID prn muscle spasm

## 2020-11-18 ENCOUNTER — OFFICE VISIT (OUTPATIENT)
Dept: ORTHOPEDIC SURGERY | Age: 65
End: 2020-11-18
Payer: OTHER MISCELLANEOUS

## 2020-11-18 VITALS
SYSTOLIC BLOOD PRESSURE: 139 MMHG | TEMPERATURE: 96.9 F | HEIGHT: 70 IN | WEIGHT: 236 LBS | BODY MASS INDEX: 33.79 KG/M2 | DIASTOLIC BLOOD PRESSURE: 74 MMHG | HEART RATE: 76 BPM | OXYGEN SATURATION: 94 %

## 2020-11-18 DIAGNOSIS — M76.32 ILIOTIBIAL BAND TENDINITIS OF LEFT SIDE: ICD-10-CM

## 2020-11-18 DIAGNOSIS — S46.012A TRAUMATIC COMPLETE TEAR OF LEFT ROTATOR CUFF, INITIAL ENCOUNTER: Primary | ICD-10-CM

## 2020-11-18 DIAGNOSIS — S46.112A RUPTURE OF LEFT LONG HEAD BICEPS TENDON, INITIAL ENCOUNTER: ICD-10-CM

## 2020-11-18 PROCEDURE — G8432 DEP SCR NOT DOC, RNG: HCPCS | Performed by: ORTHOPAEDIC SURGERY

## 2020-11-18 PROCEDURE — G8417 CALC BMI ABV UP PARAM F/U: HCPCS | Performed by: ORTHOPAEDIC SURGERY

## 2020-11-18 PROCEDURE — 1101F PT FALLS ASSESS-DOCD LE1/YR: CPT | Performed by: ORTHOPAEDIC SURGERY

## 2020-11-18 PROCEDURE — 99214 OFFICE O/P EST MOD 30 MIN: CPT | Performed by: ORTHOPAEDIC SURGERY

## 2020-11-18 PROCEDURE — G8428 CUR MEDS NOT DOCUMENT: HCPCS | Performed by: ORTHOPAEDIC SURGERY

## 2020-11-18 PROCEDURE — G8754 DIAS BP LESS 90: HCPCS | Performed by: ORTHOPAEDIC SURGERY

## 2020-11-18 PROCEDURE — 3017F COLORECTAL CA SCREEN DOC REV: CPT | Performed by: ORTHOPAEDIC SURGERY

## 2020-11-18 PROCEDURE — G8536 NO DOC ELDER MAL SCRN: HCPCS | Performed by: ORTHOPAEDIC SURGERY

## 2020-11-18 PROCEDURE — G8752 SYS BP LESS 140: HCPCS | Performed by: ORTHOPAEDIC SURGERY

## 2020-11-18 RX ORDER — DICLOFENAC SODIUM 10 MG/G
4 GEL TOPICAL 4 TIMES DAILY
Qty: 100 G | Refills: 3 | Status: SHIPPED | OUTPATIENT
Start: 2020-11-18 | End: 2021-03-29 | Stop reason: SDUPTHER

## 2020-11-18 NOTE — H&P (VIEW-ONLY)
Patient: Ravin Doan                MRN: [id number]       SSN: [id number]  YOB: 1955        AGE: 77 y.o. SEX: male  Body mass index is 33.86 kg/m². PCP: Thomas Rolon MD  11/18/20    CHIEF COMPLAINT: Left shoulder pain    Pain 3/10    HPI: Ravin Doan is a 77 y.o. male patient who returns to the office today for a new complaint. He injured his left shoulder several weeks ago while at work. This is a work injury. He was unloading a heavy object off of the flat bed of a truck when all of the weight went into his left arm. To prevent it from falling he grabbed it and felt a pop and had immediate pain as well as some swelling and ecchymosis down his left arm. He saw one of my coworkers who ordered an MRI. He is here today to discuss the MRI findings. He rates his pain as 3 out of 10. The range of motion since the injury has improved initially he had significant decrease in his motion. Of note he has had a history of a right rotator cuff repair that is overall doing pretty well.     Past Medical History:   Diagnosis Date    Arthritis     Bleeding     Chronic pain     knee and shoulder    GERD (gastroesophageal reflux disease)     Headache(784.0)     migraine    High cholesterol     Hypertension     Lower back pain 11/6/2010    Other chest pain     Personal history of prostate cancer     Pure hypercholesterolemia     Right buttock pain 11/6/2010    Right foot pain     Rotator cuff tear     left-since 2010, worsened tear Jan.    Rotator cuff tear, right     Spinal stenosis     Tendonitis, tibialis     anterior    Thromboembolus ([address]     3 after sx last one 2000       Family History   Problem Relation Age of Onset   [address] [facility] Claudio Arthritis-rheumatoid Mother     Dementia Mother     Heart Disease Father     Cancer Father     Hypertension Other     Cancer Brother        Current Outpatient Medications   Medication Sig Dispense Refill    diclofenac (VOLTAREN) 1 % gel Apply 4 g to affected area four (4) times daily. 100 g 3    metaxalone (Skelaxin) 800 mg tablet Take 1 tab by mouth BID-TID prn muscle spasm 60 Tab 1    lansoprazole (PREVACID) 30 mg capsule       celecoxib (CELEBREX) 200 mg capsule TAKE ONE CAPSULE BY MOUTH TWICE DAILY. 60 Cap 2    warfarin (COUMADIN) 5 mg tablet TAKE 2 AND 1/2 TABLETS BY MOUTH DAILY OR AS DIRECTED (Patient taking differently: Take  by mouth daily. TAKE 2 AND 1/2 TABLETS BY MOUTH DAILY OR AS DIRECTED) 75 Tab 0    lisinopril-hydroCHLOROthiazide (PRINZIDE, ZESTORETIC) 20-12.5 mg per tablet TAKE 1 TABLET BY MOUTH DAILY (Patient taking differently: Take 1 Tab by mouth daily. TAKE 1 TABLET BY MOUTH DAILY) 90 Tab 1    labetalol (NORMODYNE) 100 mg tablet TAKE 1 TABLET BY MOUTH TWICE DAILY. STOP VERAPAMIL (Patient taking differently: Take 300 mg by mouth two (2) times a day. TAKE 1 TABLET BY MOUTH TWICE DAILY. STOP VERAPAMIL) 180 Tab 0    butalbital-acetaminophen-caff (FIORICET) -40 mg per capsule 2 cap three times per day as needed for headache (Patient taking differently: Take  by mouth daily. 2 cap three times per day as needed for headache) 180 Cap 1    ALPRAZolam (XANAX) 0.5 mg tablet Take one half(1/2) tab to one(1) tab by mouth at bedtime as needed for sleep (Patient taking differently: Take  by mouth nightly as needed. Take one half(1/2) tab to one(1) tab by mouth at bedtime as needed for sleep) 30 Tab 0    montelukast (SINGULAIR) 10 mg tablet TAKE 1 TABLET BY MOUTH EVERY DAY (Patient taking differently: Take 10 mg by mouth daily.) 90 Tab 0    acetaminophen (TYLENOL) 500 mg tablet Take 1,000 mg by mouth every six (6) hours as needed for Pain.  gabapentin (NEURONTIN) 100 mg capsule Take 1 cap by mouth at night for 1 week, then increase to 2 as directed. 60 Cap 2    promethazine-phenylephrine (PROMETHAZINE VC) 6.25-5 mg/5 mL syrup Take 5 mL by mouth every six (6) hours as needed for Nausea or Cough.       albuterol (PROVENTIL HFA, VENTOLIN HFA, PROAIR HFA) 90 mcg/actuation inhaler Take 2 Puffs by inhalation every four (4) hours as needed for Wheezing. 1 Inhaler 0    pantoprazole (PROTONIX) 40 mg tablet Take 1 Tab by mouth daily. 30 Tab 2       Allergies   Allergen Reactions    Amoxicillin Itching    Augmentin [Amoxicillin-Pot Clavulanate] Itching    Chlorhexidine Towelette Itching    Hibiclens [Chlorhexidine Gluconate] Itching    Milk Containing Products Diarrhea    Nsaids (Non-Steroidal Anti-Inflammatory Drug) Other (comments)     On blood thinner, contraindicated.      Penicillins Rash    Requip [Ropinirole] Nausea and Vomiting       Past Surgical History:   Procedure Laterality Date    FOOT/TOES SURGERY PROC UNLISTED      HX BACK SURGERY      HX HEENT Right 09/2018    eye surgery, macular     HX KNEE REPLACEMENT Left     HX ORTHOPAEDIC  06-25-12    Right foot with excision of bursa and adipose tissue from fifth metatarsal base by Dr. Scott Avila      lower back (1992 and 2000)    HX OTHER SURGICAL      left foot (2008)    HX OTHER SURGICAL      Retina repair     HX PROSTATECTOMY  11/2018    HX ROTATOR CUFF REPAIR Right 01/28/2019    by Dr. Donna Shaffer History     Socioeconomic History    Marital status:      Spouse name: Not on file    Number of children: Not on file    Years of education: Not on file    Highest education level: Not on file   Occupational History    Not on file   Social Needs    Financial resource strain: Not on file    Food insecurity     Worry: Not on file     Inability: Not on file    Transportation needs     Medical: Not on file     Non-medical: Not on file   Tobacco Use    Smoking status: Never Smoker    Smokeless tobacco: Never Used   Substance and Sexual Activity    Alcohol use: No    Drug use: No    Sexual activity: Not Currently   Lifestyle    Physical activity     Days per week: Not on file Minutes per session: Not on file    Stress: Not on file   Relationships    Social connections     Talks on phone: Not on file     Gets together: Not on file     Attends Latter-day service: Not on file     Active member of club or organization: Not on file     Attends meetings of clubs or organizations: Not on file     Relationship status: Not on file    Intimate partner violence     Fear of current or ex partner: Not on file     Emotionally abused: Not on file     Physically abused: Not on file     Forced sexual activity: Not on file   Other Topics Concern     Service Not Asked    Blood Transfusions Not Asked    Caffeine Concern Not Asked    Occupational Exposure Not Asked   [place]  Hazards Not Asked    Sleep Concern Not Asked    Stress Concern Not Asked    Weight Concern Not Asked    Special Diet Not Asked    Back Care Not Asked    Exercise Not Asked    Bike Helmet Not Asked   [address] Not Asked    Self-Exams Not Asked   Social History Narrative    Not on file       REVIEW OF SYSTEMS:      CON: negative for recent weight loss/gain, fever, or chills  EYE: negative for double or blurry vision  ENT: negative for hoarseness  RS:   negative for cough, URI, SOB  CV:  negative for chest pain, palpitations  GI:    negative for blood in stool, nausea/vomiting  :  negative for blood in urine  MS: As per HPI  Other systems reviewed and noted below. PHYSICAL EXAMINATION:  Visit Vitals  /74 (BP 1 Location: Right arm, BP Patient Position: Sitting)   Pulse 76   Temp 96.9 °F (36.1 °C) (Temporal)   Ht 5' 10\" (1.778 m)   Wt 236 lb (107 kg)   SpO2 94%   BMI 33.86 kg/m²     Body mass index is 33.86 kg/m². GENERAL: Alert and oriented x3, in no acute distress, well-developed, well-nourished. HEENT: Normocephalic, atraumatic. RESP: Non labored breathing with equal chest rise on inspiration. CV: Well perfused extremities. No cyanosis or clubbing noted.   ABDOMEN: Soft, non-tender, non-distended. Shoulder Examination     R   L  ROM   FF  Full   140  ER  Full   Full   IR  Full   Full  Rotator Cuff Pain   Supra  -   +   Infra  -   -   Subscap -   +  Crepitus  -   -  Effusion  -   -  Warmth  -   -   Erythema  -   -  Instability  -   -  AC Joint TTP  -   -  Clavicle   Deformity -   -   TTP  -   -  Proximal Humerus   Deformity -   -   TTP  -   -  Deltoid Strength 5   5  Biceps Strength 5   5  Biceps Deformity -   -  Biceps Groove Pain -   -  Impingement Sign -   -       IMAGING:  An MRI of the left shoulder was reviewed in the office today. The MRI is from 11/16/2020. The MRI shows a full-thickness tear of the supraspinatus with retraction of approximately 3 cm. There is no full-thickness tearing of the subscapularis or infraspinatus noted. There is also a full-thickness retracted tear of the long head of the biceps tendon. ASSESSMENT & PLAN  Diagnosis: Left shoulder acute traumatic rotator cuff tear, supraspinatus    Cuco Moreno has an acute rotator cuff tear of the left shoulder including the supraspinatus as well as a long head of the biceps tear. For his biceps tear I do not recommend any surgery but for the rotator cuff tear which is full-thickness and acute we discussed surgical treatment in the form of an arthroscopic rotator cuff repair. We discussed the risk benefits and alternatives. At this point he would like to move forward with surgery. This is a work-related injury so we will work on getting this approved through his Worker's Compensation claim to move forward with surgery. In the meantime I have put him on light duty only with no use of the left arm for the next month until we get him scheduled for surgery. All of he and his wife's questions were answered. The patient was counseled at length about the risks of braydon Covid-19 during their perioperative period and any recovery window from their procedure.   The patient was made aware that braydon Covid-19  may worsen their prognosis for recovering from their procedure and lend to a higher morbidity and/or mortality risk. All material risks, benefits, and reasonable alternatives including postponing the procedure were discussed. The patient does  wish to proceed with the procedure at this time.             Electronically signed by: Sally Marinelli MD

## 2020-11-18 NOTE — LETTER
NOTIFICATION RETURN TO WORK / SCHOOL 
 
11/18/2020 2:48 PM 
 
Mr. Irvin Saldana [address] [facility] [address] To Whom It May Concern: 
 
Irvin Saldana is currently under the care of [address] [place] [address]. He will return to work/school with the following restrictions: 
 
Left arm no use of left arm at this time No lifting/carrying/pushing/pulling left arm These restrictions are in place for the next 4 weeks while we await surgery. If there are questions or concerns please have the patient contact our office.  
 
 
 
Sincerely, 
 
 
Rodney Douglas MD

## 2020-11-18 NOTE — PROGRESS NOTES
Patient: Patito Lr                MRN: [id number]       SSN: [id number]  YOB: 1955        AGE: 77 y.o. SEX: male  Body mass index is 33.86 kg/m². PCP: Polo Herrmann MD  11/18/20    CHIEF COMPLAINT: Left shoulder pain    Pain 3/10    HPI: Patito Lr is a 77 y.o. male patient who returns to the office today for a new complaint. He injured his left shoulder several weeks ago while at work. This is a work injury. He was unloading a heavy object off of the flat bed of a truck when all of the weight went into his left arm. To prevent it from falling he grabbed it and felt a pop and had immediate pain as well as some swelling and ecchymosis down his left arm. He saw one of my coworkers who ordered an MRI. He is here today to discuss the MRI findings. He rates his pain as 3 out of 10. The range of motion since the injury has improved initially he had significant decrease in his motion. Of note he has had a history of a right rotator cuff repair that is overall doing pretty well.     Past Medical History:   Diagnosis Date    Arthritis     Bleeding     Chronic pain     knee and shoulder    GERD (gastroesophageal reflux disease)     Headache(784.0)     migraine    High cholesterol     Hypertension     Lower back pain 11/6/2010    Other chest pain     Personal history of prostate cancer     Pure hypercholesterolemia     Right buttock pain 11/6/2010    Right foot pain     Rotator cuff tear     left-since 2010, worsened tear Young.    Rotator cuff tear, right     Spinal stenosis     Tendonitis, tibialis     anterior    Thromboembolus ([address]     3 after sx last one 2000       Family History   Problem Relation Age of Onset   [address] [facility] [address] Arthritis-rheumatoid Mother     Dementia Mother     Heart Disease Father     Cancer Father     Hypertension Other     Cancer Brother        Current Outpatient Medications   Medication Sig Dispense Refill    diclofenac (VOLTAREN) 1 % gel Apply 4 g to affected area four (4) times daily. 100 g 3    metaxalone (Skelaxin) 800 mg tablet Take 1 tab by mouth BID-TID prn muscle spasm 60 Tab 1    lansoprazole (PREVACID) 30 mg capsule       celecoxib (CELEBREX) 200 mg capsule TAKE ONE CAPSULE BY MOUTH TWICE DAILY. 60 Cap 2    warfarin (COUMADIN) 5 mg tablet TAKE 2 AND 1/2 TABLETS BY MOUTH DAILY OR AS DIRECTED (Patient taking differently: Take  by mouth daily. TAKE 2 AND 1/2 TABLETS BY MOUTH DAILY OR AS DIRECTED) 75 Tab 0    lisinopril-hydroCHLOROthiazide (PRINZIDE, ZESTORETIC) 20-12.5 mg per tablet TAKE 1 TABLET BY MOUTH DAILY (Patient taking differently: Take 1 Tab by mouth daily. TAKE 1 TABLET BY MOUTH DAILY) 90 Tab 1    labetalol (NORMODYNE) 100 mg tablet TAKE 1 TABLET BY MOUTH TWICE DAILY. STOP VERAPAMIL (Patient taking differently: Take 300 mg by mouth two (2) times a day. TAKE 1 TABLET BY MOUTH TWICE DAILY. STOP VERAPAMIL) 180 Tab 0    butalbital-acetaminophen-caff (FIORICET) -40 mg per capsule 2 cap three times per day as needed for headache (Patient taking differently: Take  by mouth daily. 2 cap three times per day as needed for headache) 180 Cap 1    ALPRAZolam (XANAX) 0.5 mg tablet Take one half(1/2) tab to one(1) tab by mouth at bedtime as needed for sleep (Patient taking differently: Take  by mouth nightly as needed. Take one half(1/2) tab to one(1) tab by mouth at bedtime as needed for sleep) 30 Tab 0    montelukast (SINGULAIR) 10 mg tablet TAKE 1 TABLET BY MOUTH EVERY DAY (Patient taking differently: Take 10 mg by mouth daily.) 90 Tab 0    acetaminophen (TYLENOL) 500 mg tablet Take 1,000 mg by mouth every six (6) hours as needed for Pain.  gabapentin (NEURONTIN) 100 mg capsule Take 1 cap by mouth at night for 1 week, then increase to 2 as directed. 60 Cap 2    promethazine-phenylephrine (PROMETHAZINE VC) 6.25-5 mg/5 mL syrup Take 5 mL by mouth every six (6) hours as needed for Nausea or Cough.       albuterol (PROVENTIL HFA, VENTOLIN HFA, PROAIR HFA) 90 mcg/actuation inhaler Take 2 Puffs by inhalation every four (4) hours as needed for Wheezing. 1 Inhaler 0    pantoprazole (PROTONIX) 40 mg tablet Take 1 Tab by mouth daily. 30 Tab 2       Allergies   Allergen Reactions    Amoxicillin Itching    Augmentin [Amoxicillin-Pot Clavulanate] Itching    Chlorhexidine Towelette Itching    Hibiclens [Chlorhexidine Gluconate] Itching    Milk Containing Products Diarrhea    Nsaids (Non-Steroidal Anti-Inflammatory Drug) Other (comments)     On blood thinner, contraindicated.      Penicillins Rash    Requip [Ropinirole] Nausea and Vomiting       Past Surgical History:   Procedure Laterality Date    FOOT/TOES SURGERY PROC UNLISTED      HX BACK SURGERY      HX HEENT Right 09/2018    eye surgery, macular     HX KNEE REPLACEMENT Left     HX ORTHOPAEDIC  06-25-12    Right foot with excision of bursa and adipose tissue from fifth metatarsal base by Dr. Rufus Krishnan      lower back (1992 and 2000)    HX OTHER SURGICAL      left foot (2008)    HX OTHER SURGICAL      Retina repair     HX PROSTATECTOMY  11/2018    HX ROTATOR CUFF REPAIR Right 01/28/2019    by Dr. Avani Salazar History     Socioeconomic History    Marital status:      Spouse name: Not on file    Number of children: Not on file    Years of education: Not on file    Highest education level: Not on file   Occupational History    Not on file   Social Needs    Financial resource strain: Not on file    Food insecurity     Worry: Not on file     Inability: Not on file    Transportation needs     Medical: Not on file     Non-medical: Not on file   Tobacco Use    Smoking status: Never Smoker    Smokeless tobacco: Never Used   Substance and Sexual Activity    Alcohol use: No    Drug use: No    Sexual activity: Not Currently   Lifestyle    Physical activity     Days per week: Not on file Minutes per session: Not on file    Stress: Not on file   Relationships    Social connections     Talks on phone: Not on file     Gets together: Not on file     Attends Jewish service: Not on file     Active member of club or organization: Not on file     Attends meetings of clubs or organizations: Not on file     Relationship status: Not on file    Intimate partner violence     Fear of current or ex partner: Not on file     Emotionally abused: Not on file     Physically abused: Not on file     Forced sexual activity: Not on file   Other Topics Concern     Service Not Asked    Blood Transfusions Not Asked    Caffeine Concern Not Asked    Occupational Exposure Not Asked   Zakiya Even Hazards Not Asked    Sleep Concern Not Asked    Stress Concern Not Asked    Weight Concern Not Asked    Special Diet Not Asked    Back Care Not Asked    Exercise Not Asked    Bike Helmet Not Asked   [address] Not Asked    Self-Exams Not Asked   Social History Narrative    Not on file       REVIEW OF SYSTEMS:      CON: negative for recent weight loss/gain, fever, or chills  EYE: negative for double or blurry vision  ENT: negative for hoarseness  RS:   negative for cough, URI, SOB  CV:  negative for chest pain, palpitations  GI:    negative for blood in stool, nausea/vomiting  :  negative for blood in urine  MS: As per HPI  Other systems reviewed and noted below. PHYSICAL EXAMINATION:  Visit Vitals  /74 (BP 1 Location: Right arm, BP Patient Position: Sitting)   Pulse 76   Temp 96.9 °F (36.1 °C) (Temporal)   Ht 5' 10\" (1.778 m)   Wt 236 lb (107 kg)   SpO2 94%   BMI 33.86 kg/m²     Body mass index is 33.86 kg/m². GENERAL: Alert and oriented x3, in no acute distress, well-developed, well-nourished. HEENT: Normocephalic, atraumatic. RESP: Non labored breathing with equal chest rise on inspiration. CV: Well perfused extremities. No cyanosis or clubbing noted.   ABDOMEN: Soft, non-tender, non-distended. Shoulder Examination     R   L  ROM   FF  Full   140  ER  Full   Full   IR  Full   Full  Rotator Cuff Pain   Supra  -   +   Infra  -   -   Subscap -   +  Crepitus  -   -  Effusion  -   -  Warmth  -   -   Erythema  -   -  Instability  -   -  AC Joint TTP  -   -  Clavicle   Deformity -   -   TTP  -   -  Proximal Humerus   Deformity -   -   TTP  -   -  Deltoid Strength 5   5  Biceps Strength 5   5  Biceps Deformity -   -  Biceps Groove Pain -   -  Impingement Sign -   -       IMAGING:  An MRI of the left shoulder was reviewed in the office today. The MRI is from 11/16/2020. The MRI shows a full-thickness tear of the supraspinatus with retraction of approximately 3 cm. There is no full-thickness tearing of the subscapularis or infraspinatus noted. There is also a full-thickness retracted tear of the long head of the biceps tendon. ASSESSMENT & PLAN  Diagnosis: Left shoulder acute traumatic rotator cuff tear, supraspinatus    Treasure Blackman has an acute rotator cuff tear of the left shoulder including the supraspinatus as well as a long head of the biceps tear. For his biceps tear I do not recommend any surgery but for the rotator cuff tear which is full-thickness and acute we discussed surgical treatment in the form of an arthroscopic rotator cuff repair. We discussed the risk benefits and alternatives. At this point he would like to move forward with surgery. This is a work-related injury so we will work on getting this approved through his Worker's Compensation claim to move forward with surgery. In the meantime I have put him on light duty only with no use of the left arm for the next month until we get him scheduled for surgery. All of he and his wife's questions were answered. The patient was counseled at length about the risks of braydon Covid-19 during their perioperative period and any recovery window from their procedure.   The patient was made aware that braydon Covid-19  may worsen their prognosis for recovering from their procedure and lend to a higher morbidity and/or mortality risk. All material risks, benefits, and reasonable alternatives including postponing the procedure were discussed. The patient does  wish to proceed with the procedure at this time.             Electronically signed by: Charis Rizo MD

## 2020-11-23 ENCOUNTER — TELEPHONE (OUTPATIENT)
Dept: ORTHOPEDIC SURGERY | Age: 65
End: 2020-11-23

## 2020-11-23 NOTE — TELEPHONE ENCOUNTER
Patient called stating per his employer: [employer] is no light duty available\" and is requesting a revised work note putting him out of work until the surgery is scheduled. Please advise patient when completed:[phone].

## 2020-11-24 NOTE — TELEPHONE ENCOUNTER
Per Dr Hargrove Massed to be out of work until surgery. Letter written and in chart for patient to . Attempted to contact patient, had to leave generic VM due to generic greeting.

## 2020-12-01 DIAGNOSIS — Z01.812 PRE-OPERATIVE LABORATORY EXAMINATION: ICD-10-CM

## 2020-12-01 DIAGNOSIS — Z01.810 PRE-OPERATIVE CARDIOVASCULAR EXAMINATION: ICD-10-CM

## 2020-12-01 DIAGNOSIS — D68.9 COAGULOPATHY (HCC): ICD-10-CM

## 2020-12-01 DIAGNOSIS — S46.112A RUPTURE OF LEFT LONG HEAD BICEPS TENDON, INITIAL ENCOUNTER: ICD-10-CM

## 2020-12-01 DIAGNOSIS — S46.012A TRAUMATIC COMPLETE TEAR OF LEFT ROTATOR CUFF, INITIAL ENCOUNTER: Primary | ICD-10-CM

## 2020-12-02 ENCOUNTER — HOSPITAL ENCOUNTER (OUTPATIENT)
Dept: PREADMISSION TESTING | Age: 65
Discharge: HOME | End: 2020-12-02
Payer: OTHER MISCELLANEOUS

## 2020-12-02 DIAGNOSIS — Z01.812 PRE-OPERATIVE LABORATORY EXAMINATION: ICD-10-CM

## 2020-12-02 DIAGNOSIS — S46.112A RUPTURE OF LEFT LONG HEAD BICEPS TENDON, INITIAL ENCOUNTER: ICD-10-CM

## 2020-12-02 DIAGNOSIS — S46.012A TRAUMATIC COMPLETE TEAR OF LEFT ROTATOR CUFF, INITIAL ENCOUNTER: ICD-10-CM

## 2020-12-02 DIAGNOSIS — D68.9 COAGULOPATHY (HCC): ICD-10-CM

## 2020-12-02 LAB
ALBUMIN SERPL-MCNC: 3.7 G/DL (ref 3.4–5)
ALBUMIN/GLOB SERPL: 1.4 {RATIO} (ref 0.8–1.7)
ALP SERPL-CCNC: 76 U/L (ref 45–117)
ALT SERPL-CCNC: 26 U/L (ref 16–61)
ANION GAP SERPL CALC-SCNC: 3 MMOL/L (ref 3–18)
APTT PPP: 34.8 SEC (ref 23–36.4)
AST SERPL-CCNC: 18 U/L (ref 10–38)
BASOPHILS # BLD: 0 K/UL (ref 0–0.1)
BASOPHILS NFR BLD: 1 % (ref 0–2)
BILIRUB SERPL-MCNC: 0.3 MG/DL (ref 0.2–1)
BUN SERPL-MCNC: 14 MG/DL (ref 7–18)
BUN/CREAT SERPL: 15 (ref 12–20)
CALCIUM SERPL-MCNC: 9.1 MG/DL (ref 8.5–10.1)
CHLORIDE SERPL-SCNC: 106 MMOL/L (ref 100–111)
CO2 SERPL-SCNC: 32 MMOL/L (ref 21–32)
CREAT SERPL-MCNC: 0.96 MG/DL (ref 0.6–1.3)
DIFFERENTIAL METHOD BLD: ABNORMAL
EOSINOPHIL # BLD: 0.2 K/UL (ref 0–0.4)
EOSINOPHIL NFR BLD: 3 % (ref 0–5)
ERYTHROCYTE [DISTWIDTH] IN BLOOD BY AUTOMATED COUNT: 12.9 % (ref 11.6–14.5)
GLOBULIN SER CALC-MCNC: 2.7 G/DL (ref 2–4)
GLUCOSE SERPL-MCNC: 83 MG/DL (ref 74–99)
HCT VFR BLD AUTO: 39.9 % (ref 36–48)
HGB BLD-MCNC: 13.4 G/DL (ref 13–16)
INR PPP: 1.9 (ref 0.8–1.2)
LYMPHOCYTES # BLD: 1.1 K/UL (ref 0.9–3.6)
LYMPHOCYTES NFR BLD: 20 % (ref 21–52)
MCH RBC QN AUTO: 32.4 PG (ref 24–34)
MCHC RBC AUTO-ENTMCNC: 33.6 G/DL (ref 31–37)
MCV RBC AUTO: 96.4 FL (ref 74–97)
MONOCYTES # BLD: 0.8 K/UL (ref 0.05–1.2)
MONOCYTES NFR BLD: 14 % (ref 3–10)
NEUTS SEG # BLD: 3.6 K/UL (ref 1.8–8)
NEUTS SEG NFR BLD: 62 % (ref 40–73)
PLATELET # BLD AUTO: 183 K/UL (ref 135–420)
PMV BLD AUTO: 11.4 FL (ref 9.2–11.8)
POTASSIUM SERPL-SCNC: 4.6 MMOL/L (ref 3.5–5.5)
PROT SERPL-MCNC: 6.4 G/DL (ref 6.4–8.2)
PROTHROMBIN TIME: 20.9 SEC (ref 11.5–15.2)
RBC # BLD AUTO: 4.14 M/UL (ref 4.7–5.5)
SODIUM SERPL-SCNC: 141 MMOL/L (ref 136–145)
WBC # BLD AUTO: 5.8 K/UL (ref 4.6–13.2)

## 2020-12-02 PROCEDURE — 87635 SARS-COV-2 COVID-19 AMP PRB: CPT

## 2020-12-02 PROCEDURE — 85730 THROMBOPLASTIN TIME PARTIAL: CPT

## 2020-12-02 PROCEDURE — 36415 COLL VENOUS BLD VENIPUNCTURE: CPT

## 2020-12-02 PROCEDURE — 85025 COMPLETE CBC W/AUTO DIFF WBC: CPT

## 2020-12-02 PROCEDURE — 80053 COMPREHEN METABOLIC PANEL: CPT

## 2020-12-02 PROCEDURE — 85610 PROTHROMBIN TIME: CPT

## 2020-12-02 PROCEDURE — 93005 ELECTROCARDIOGRAM TRACING: CPT

## 2020-12-03 LAB
ATRIAL RATE: 58 BPM
CALCULATED P AXIS, ECG09: 44 DEGREES
CALCULATED R AXIS, ECG10: 5 DEGREES
CALCULATED T AXIS, ECG11: 50 DEGREES
DIAGNOSIS, 93000: NORMAL
P-R INTERVAL, ECG05: 202 MS
Q-T INTERVAL, ECG07: 402 MS
QRS DURATION, ECG06: 90 MS
QTC CALCULATION (BEZET), ECG08: 394 MS
VENTRICULAR RATE, ECG03: 58 BPM

## 2020-12-04 LAB — SARS-COV-2, COV2NT: NOT DETECTED

## 2020-12-04 RX ORDER — ENOXAPARIN SODIUM 100 MG/ML
40 INJECTION SUBCUTANEOUS
COMMUNITY
End: 2020-12-07

## 2020-12-06 ENCOUNTER — ANESTHESIA EVENT (OUTPATIENT)
Dept: SURGERY | Age: 65
End: 2020-12-06
Payer: OTHER MISCELLANEOUS

## 2020-12-07 ENCOUNTER — ANESTHESIA (OUTPATIENT)
Dept: SURGERY | Age: 65
End: 2020-12-07
Payer: OTHER MISCELLANEOUS

## 2020-12-07 ENCOUNTER — HOSPITAL ENCOUNTER (OUTPATIENT)
Age: 65
Setting detail: OUTPATIENT SURGERY
Discharge: HOME | End: 2020-12-07
Attending: ORTHOPAEDIC SURGERY | Admitting: ORTHOPAEDIC SURGERY
Payer: OTHER MISCELLANEOUS

## 2020-12-07 VITALS
OXYGEN SATURATION: 91 % | HEART RATE: 82 BPM | DIASTOLIC BLOOD PRESSURE: 67 MMHG | RESPIRATION RATE: 16 BRPM | TEMPERATURE: 96.9 F | SYSTOLIC BLOOD PRESSURE: 109 MMHG | BODY MASS INDEX: 33.28 KG/M2 | HEIGHT: 70 IN | WEIGHT: 232.5 LBS

## 2020-12-07 DIAGNOSIS — G89.18 PAIN FOLLOWING SURGERY OR PROCEDURE: Primary | ICD-10-CM

## 2020-12-07 DIAGNOSIS — M75.122 COMPLETE TEAR OF LEFT ROTATOR CUFF, UNSPECIFIED WHETHER TRAUMATIC: ICD-10-CM

## 2020-12-07 DIAGNOSIS — S46.102A UNSPECIFIED INJURY OF MUSCLE, FASCIA AND TENDON OF LONG HEAD OF BICEPS, LEFT ARM, INITIAL ENCOUNTER: ICD-10-CM

## 2020-12-07 PROCEDURE — 01630 ANES OPN/ARTHR PX SHO JT NOS: CPT | Performed by: NURSE ANESTHETIST, CERTIFIED REGISTERED

## 2020-12-07 PROCEDURE — 76010000131 HC OR TIME 2 TO 2.5 HR: Performed by: ORTHOPAEDIC SURGERY

## 2020-12-07 PROCEDURE — 74011250637 HC RX REV CODE- 250/637: Performed by: NURSE ANESTHETIST, CERTIFIED REGISTERED

## 2020-12-07 PROCEDURE — 77030033005 HC TBNG ARTHSC PMP STRY -B: Performed by: ORTHOPAEDIC SURGERY

## 2020-12-07 PROCEDURE — 77030040361 HC SLV COMPR DVT MDII -B: Performed by: ORTHOPAEDIC SURGERY

## 2020-12-07 PROCEDURE — 77030002916 HC SUT ETHLN J&J -A: Performed by: ORTHOPAEDIC SURGERY

## 2020-12-07 PROCEDURE — 29827 SHO ARTHRS SRG RT8TR CUF RPR: CPT | Performed by: ORTHOPAEDIC SURGERY

## 2020-12-07 PROCEDURE — 29823 SHO ARTHRS SRG XTNSV DBRDMT: CPT | Performed by: ORTHOPAEDIC SURGERY

## 2020-12-07 PROCEDURE — 74011250636 HC RX REV CODE- 250/636: Performed by: ANESTHESIOLOGY

## 2020-12-07 PROCEDURE — 77030004453 HC BUR SHV STRY -B: Performed by: ORTHOPAEDIC SURGERY

## 2020-12-07 PROCEDURE — 76210000020 HC REC RM PH II FIRST 0.5 HR: Performed by: ORTHOPAEDIC SURGERY

## 2020-12-07 PROCEDURE — 77030017964 HC PRB COAG4 STRY -C: Performed by: ORTHOPAEDIC SURGERY

## 2020-12-07 PROCEDURE — 77030020268 HC MISC GENERAL SUPPLY: Performed by: ORTHOPAEDIC SURGERY

## 2020-12-07 PROCEDURE — 74011250636 HC RX REV CODE- 250/636: Performed by: NURSE ANESTHETIST, CERTIFIED REGISTERED

## 2020-12-07 PROCEDURE — 77030019605: Performed by: ORTHOPAEDIC SURGERY

## 2020-12-07 PROCEDURE — 74011000258 HC RX REV CODE- 258: Performed by: NURSE ANESTHETIST, CERTIFIED REGISTERED

## 2020-12-07 PROCEDURE — 76210000006 HC OR PH I REC 0.5 TO 1 HR: Performed by: ORTHOPAEDIC SURGERY

## 2020-12-07 PROCEDURE — C1713 ANCHOR/SCREW BN/BN,TIS/BN: HCPCS | Performed by: ORTHOPAEDIC SURGERY

## 2020-12-07 PROCEDURE — 01630 ANES OPN/ARTHR PX SHO JT NOS: CPT | Performed by: ANESTHESIOLOGY

## 2020-12-07 PROCEDURE — 76942 ECHO GUIDE FOR BIOPSY: CPT | Performed by: ANESTHESIOLOGY

## 2020-12-07 PROCEDURE — 77030022036 HC BLD SHV TOMCAT STRY -B: Performed by: ORTHOPAEDIC SURGERY

## 2020-12-07 PROCEDURE — 64450 NJX AA&/STRD OTHER PN/BRANCH: CPT | Performed by: ANESTHESIOLOGY

## 2020-12-07 PROCEDURE — 76060000035 HC ANESTHESIA 2 TO 2.5 HR: Performed by: ORTHOPAEDIC SURGERY

## 2020-12-07 PROCEDURE — 77030040922 HC BLNKT HYPOTHRM STRY -A: Performed by: ORTHOPAEDIC SURGERY

## 2020-12-07 PROCEDURE — 2709999900 HC NON-CHARGEABLE SUPPLY: Performed by: ORTHOPAEDIC SURGERY

## 2020-12-07 PROCEDURE — 74011000250 HC RX REV CODE- 250: Performed by: NURSE ANESTHETIST, CERTIFIED REGISTERED

## 2020-12-07 PROCEDURE — 77030010427: Performed by: ORTHOPAEDIC SURGERY

## 2020-12-07 DEVICE — ANCHOR SUTURE BIOCOMP 4.75X19.1 MM SWIVELOCK C: Type: IMPLANTABLE DEVICE | Site: SHOULDER | Status: FUNCTIONAL

## 2020-12-07 DEVICE — ANCHOR SUT L14.7MM DIA5.5MM BIOCOMPOSITE FULL THRD W/ 1.3MM: Type: IMPLANTABLE DEVICE | Site: SHOULDER | Status: FUNCTIONAL

## 2020-12-07 RX ORDER — PROPOFOL 10 MG/ML
INJECTION, EMULSION INTRAVENOUS AS NEEDED
Status: DISCONTINUED | OUTPATIENT
Start: 2020-12-07 | End: 2020-12-07 | Stop reason: HOSPADM

## 2020-12-07 RX ORDER — CLINDAMYCIN PHOSPHATE 900 MG/50ML
900 INJECTION, SOLUTION INTRAVENOUS ONCE
Status: DISCONTINUED | OUTPATIENT
Start: 2020-12-07 | End: 2020-12-07 | Stop reason: HOSPADM

## 2020-12-07 RX ORDER — SODIUM CHLORIDE 0.9 % (FLUSH) 0.9 %
5-40 SYRINGE (ML) INJECTION AS NEEDED
Status: DISCONTINUED | OUTPATIENT
Start: 2020-12-07 | End: 2020-12-07 | Stop reason: HOSPADM

## 2020-12-07 RX ORDER — HYDROCODONE BITARTRATE AND ACETAMINOPHEN 10; 325 MG/1; MG/1
1 TABLET ORAL
Qty: 35 TAB | Refills: 0 | Status: SHIPPED | OUTPATIENT
Start: 2020-12-07 | End: 2020-12-14

## 2020-12-07 RX ORDER — ROPIVACAINE HYDROCHLORIDE 5 MG/ML
INJECTION, SOLUTION EPIDURAL; INFILTRATION; PERINEURAL
Status: COMPLETED | OUTPATIENT
Start: 2020-12-07 | End: 2020-12-07

## 2020-12-07 RX ORDER — LIDOCAINE HYDROCHLORIDE 20 MG/ML
INJECTION, SOLUTION EPIDURAL; INFILTRATION; INTRACAUDAL; PERINEURAL AS NEEDED
Status: DISCONTINUED | OUTPATIENT
Start: 2020-12-07 | End: 2020-12-07 | Stop reason: HOSPADM

## 2020-12-07 RX ORDER — NEOSTIGMINE METHYLSULFATE 1 MG/ML
INJECTION INTRAVENOUS AS NEEDED
Status: DISCONTINUED | OUTPATIENT
Start: 2020-12-07 | End: 2020-12-07 | Stop reason: HOSPADM

## 2020-12-07 RX ORDER — LIDOCAINE HYDROCHLORIDE 10 MG/ML
0.1 INJECTION, SOLUTION EPIDURAL; INFILTRATION; INTRACAUDAL; PERINEURAL AS NEEDED
Status: DISCONTINUED | OUTPATIENT
Start: 2020-12-07 | End: 2020-12-07 | Stop reason: HOSPADM

## 2020-12-07 RX ORDER — FENTANYL CITRATE 50 UG/ML
100 INJECTION, SOLUTION INTRAMUSCULAR; INTRAVENOUS ONCE
Status: COMPLETED | OUTPATIENT
Start: 2020-12-07 | End: 2020-12-07

## 2020-12-07 RX ORDER — SODIUM CHLORIDE, SODIUM LACTATE, POTASSIUM CHLORIDE, CALCIUM CHLORIDE 600; 310; 30; 20 MG/100ML; MG/100ML; MG/100ML; MG/100ML
75 INJECTION, SOLUTION INTRAVENOUS CONTINUOUS
Status: DISCONTINUED | OUTPATIENT
Start: 2020-12-07 | End: 2020-12-07 | Stop reason: HOSPADM

## 2020-12-07 RX ORDER — GLYCOPYRROLATE 0.2 MG/ML
INJECTION INTRAMUSCULAR; INTRAVENOUS AS NEEDED
Status: DISCONTINUED | OUTPATIENT
Start: 2020-12-07 | End: 2020-12-07 | Stop reason: HOSPADM

## 2020-12-07 RX ORDER — ONDANSETRON 2 MG/ML
4 INJECTION INTRAMUSCULAR; INTRAVENOUS ONCE
Status: DISCONTINUED | OUTPATIENT
Start: 2020-12-07 | End: 2020-12-07 | Stop reason: HOSPADM

## 2020-12-07 RX ORDER — ROCURONIUM BROMIDE 10 MG/ML
INJECTION, SOLUTION INTRAVENOUS AS NEEDED
Status: DISCONTINUED | OUTPATIENT
Start: 2020-12-07 | End: 2020-12-07 | Stop reason: HOSPADM

## 2020-12-07 RX ORDER — SODIUM CHLORIDE 0.9 % (FLUSH) 0.9 %
5-40 SYRINGE (ML) INJECTION EVERY 8 HOURS
Status: DISCONTINUED | OUTPATIENT
Start: 2020-12-07 | End: 2020-12-07 | Stop reason: HOSPADM

## 2020-12-07 RX ORDER — DEXAMETHASONE SODIUM PHOSPHATE 4 MG/ML
INJECTION, SOLUTION INTRA-ARTICULAR; INTRALESIONAL; INTRAMUSCULAR; INTRAVENOUS; SOFT TISSUE AS NEEDED
Status: DISCONTINUED | OUTPATIENT
Start: 2020-12-07 | End: 2020-12-07 | Stop reason: HOSPADM

## 2020-12-07 RX ORDER — EPHEDRINE SULFATE/0.9% NACL/PF 50 MG/5 ML
SYRINGE (ML) INTRAVENOUS AS NEEDED
Status: DISCONTINUED | OUTPATIENT
Start: 2020-12-07 | End: 2020-12-07 | Stop reason: HOSPADM

## 2020-12-07 RX ORDER — SODIUM CHLORIDE, SODIUM LACTATE, POTASSIUM CHLORIDE, CALCIUM CHLORIDE 600; 310; 30; 20 MG/100ML; MG/100ML; MG/100ML; MG/100ML
INJECTION, SOLUTION INTRAVENOUS
Status: DISCONTINUED | OUTPATIENT
Start: 2020-12-07 | End: 2020-12-07 | Stop reason: HOSPADM

## 2020-12-07 RX ORDER — SUCCINYLCHOLINE CHLORIDE 20 MG/ML
INJECTION INTRAMUSCULAR; INTRAVENOUS AS NEEDED
Status: DISCONTINUED | OUTPATIENT
Start: 2020-12-07 | End: 2020-12-07 | Stop reason: HOSPADM

## 2020-12-07 RX ORDER — MIDAZOLAM HYDROCHLORIDE 1 MG/ML
2 INJECTION, SOLUTION INTRAMUSCULAR; INTRAVENOUS ONCE
Status: COMPLETED | OUTPATIENT
Start: 2020-12-07 | End: 2020-12-07

## 2020-12-07 RX ORDER — FENTANYL CITRATE 50 UG/ML
INJECTION, SOLUTION INTRAMUSCULAR; INTRAVENOUS AS NEEDED
Status: DISCONTINUED | OUTPATIENT
Start: 2020-12-07 | End: 2020-12-07 | Stop reason: HOSPADM

## 2020-12-07 RX ORDER — FAMOTIDINE 20 MG/1
20 TABLET, FILM COATED ORAL ONCE
Status: COMPLETED | OUTPATIENT
Start: 2020-12-07 | End: 2020-12-07

## 2020-12-07 RX ORDER — ROPIVACAINE HYDROCHLORIDE 5 MG/ML
30 INJECTION, SOLUTION EPIDURAL; INFILTRATION; PERINEURAL
Status: COMPLETED | OUTPATIENT
Start: 2020-12-07 | End: 2020-12-07

## 2020-12-07 RX ADMIN — FAMOTIDINE 20 MG: 20 TABLET, FILM COATED ORAL at 12:19

## 2020-12-07 RX ADMIN — ROPIVACAINE HYDROCHLORIDE 25 ML: 5 INJECTION, SOLUTION EPIDURAL; INFILTRATION; PERINEURAL at 12:45

## 2020-12-07 RX ADMIN — MIDAZOLAM 2 MG: 1 INJECTION INTRAMUSCULAR; INTRAVENOUS at 12:44

## 2020-12-07 RX ADMIN — DEXAMETHASONE SODIUM PHOSPHATE 8 MG: 4 INJECTION, SOLUTION INTRAMUSCULAR; INTRAVENOUS at 13:05

## 2020-12-07 RX ADMIN — ROPIVACAINE HYDROCHLORIDE 150 MG: 5 INJECTION, SOLUTION EPIDURAL; INFILTRATION; PERINEURAL at 12:44

## 2020-12-07 RX ADMIN — FENTANYL CITRATE 50 MCG: 50 INJECTION, SOLUTION INTRAMUSCULAR; INTRAVENOUS at 14:15

## 2020-12-07 RX ADMIN — NEOSTIGMINE METHYLSULFATE 3 MG: 1 INJECTION, SOLUTION INTRAVENOUS at 14:37

## 2020-12-07 RX ADMIN — PHENYLEPHRINE HYDROCHLORIDE 40 MCG/MIN: 10 INJECTION INTRAVENOUS at 13:08

## 2020-12-07 RX ADMIN — ROCURONIUM BROMIDE 20 MG: 50 INJECTION INTRAVENOUS at 13:05

## 2020-12-07 RX ADMIN — LIDOCAINE HYDROCHLORIDE 80 MG: 20 INJECTION, SOLUTION EPIDURAL; INFILTRATION; INTRACAUDAL; PERINEURAL at 12:56

## 2020-12-07 RX ADMIN — PHENYLEPHRINE HYDROCHLORIDE 100 MCG: 10 INJECTION INTRAVENOUS at 13:08

## 2020-12-07 RX ADMIN — GLYCOPYRROLATE 0.6 MG: 0.2 INJECTION INTRAMUSCULAR; INTRAVENOUS at 14:37

## 2020-12-07 RX ADMIN — SODIUM CHLORIDE, SODIUM LACTATE, POTASSIUM CHLORIDE, AND CALCIUM CHLORIDE 75 ML/HR: 600; 310; 30; 20 INJECTION, SOLUTION INTRAVENOUS at 12:20

## 2020-12-07 RX ADMIN — SUCCINYLCHOLINE CHLORIDE 140 MG: 20 INJECTION, SOLUTION INTRAMUSCULAR; INTRAVENOUS at 12:57

## 2020-12-07 RX ADMIN — Medication 10 MG: at 13:28

## 2020-12-07 RX ADMIN — PHENYLEPHRINE HYDROCHLORIDE 100 MCG: 10 INJECTION INTRAVENOUS at 13:04

## 2020-12-07 RX ADMIN — Medication 10 MG: at 14:12

## 2020-12-07 RX ADMIN — CLINDAMYCIN PHOSPHATE 900 MG: 150 INJECTION, SOLUTION INTRAVENOUS at 13:03

## 2020-12-07 RX ADMIN — FENTANYL CITRATE 50 MCG: 50 INJECTION, SOLUTION INTRAMUSCULAR; INTRAVENOUS at 12:56

## 2020-12-07 RX ADMIN — PROPOFOL 200 MG: 10 INJECTION, EMULSION INTRAVENOUS at 12:56

## 2020-12-07 RX ADMIN — FENTANYL CITRATE 50 MCG: 50 INJECTION, SOLUTION INTRAMUSCULAR; INTRAVENOUS at 12:43

## 2020-12-07 RX ADMIN — SODIUM CHLORIDE, SODIUM LACTATE, POTASSIUM CHLORIDE, AND CALCIUM CHLORIDE: 600; 310; 30; 20 INJECTION, SOLUTION INTRAVENOUS at 11:30

## 2020-12-07 NOTE — ANESTHESIA POSTPROCEDURE EVALUATION
Procedure(s):  LEFT SHOULDER ARTHROSCOPIC ROTATOR CUFF REPAIR/GLENOHUMERAL JOINT DEBRIDEMENT/ARTHREX/SPIDER/TMAX.    general, regional    Anesthesia Post Evaluation      Multimodal analgesia: multimodal analgesia used between 6 hours prior to anesthesia start to PACU discharge  Patient location during evaluation: bedside  Patient participation: complete - patient participated  Level of consciousness: awake  Pain management: adequate  Airway patency: patent  Anesthetic complications: no  Cardiovascular status: stable  Respiratory status: acceptable  Hydration status: acceptable  Post anesthesia nausea and vomiting:  controlled      INITIAL Post-op Vital signs:   Vitals Value Taken Time   /60 12/7/2020  3:34 PM   Temp 36.1 °C (97 °F) 12/7/2020  2:56 PM   Pulse 73 12/7/2020  3:40 PM   Resp 14 12/7/2020  3:34 PM   SpO2 96 % 12/7/2020  3:40 PM

## 2020-12-07 NOTE — ANESTHESIA PROCEDURE NOTES
Peripheral Block    Start time: 12/7/2020 12:35 PM  End time: 12/7/2020 12:45 PM  Performed by: Fani Leavitt MD  Authorized by: Fani Leavitt MD       Pre-procedure: Indications: at surgeon's request and post-op pain management    Preanesthetic Checklist: patient identified, risks and benefits discussed, site marked, timeout performed, anesthesia consent given and patient being monitored      Block Type:   Block Type: Interscalene  Laterality:  Left  Monitoring:  Standard ASA monitoring, continuous pulse ox, responsive to questions, oxygen, frequent vital sign checks and heart rate  Injection Technique:  Single shot  Procedures: ultrasound guided and nerve stimulator    Patient Position: seated  Prep: chlorhexidine    Location:  Interscalene  Needle Type:  Ultraplex  Needle Gauge:  22 G  Needle Localization:  Ultrasound guidance and nerve stimulator  Motor Response comment:   Motor Response: minimal motor response >0.4 mA   Medication Injected:  Ropivacaine (PF) (NAROPIN)(0.5%) 5 mg/mL injection, 25 mL    Assessment:  Number of attempts:  1  Injection Assessment:  Incremental injection every 5 mL, negative aspiration for blood, local visualized surrounding nerve on ultrasound, no paresthesia, ultrasound image on chart and no intravascular symptoms  Patient tolerance:  Patient tolerated the procedure well with no immediate complications  For Vitals see nursing notes.      Artem Chao MD  12:45 PM

## 2020-12-07 NOTE — DISCHARGE INSTRUCTIONS
DISCHARGE SUMMARY from Nurse    PATIENT INSTRUCTIONS:    After general anesthesia or intravenous sedation, for 24 hours or while taking prescription Narcotics:  · Limit your activities  · Do not drive and operate hazardous machinery  · Do not make important personal or business decisions  · Do  not drink alcoholic beverages  · If you have not urinated within 8 hours after discharge, please contact your surgeon on call. Report the following to your surgeon:  · Excessive pain, swelling, redness or odor of or around the surgical area  · Temperature over 100.5  · Nausea and vomiting lasting longer than 4 hours or if unable to take medications  · Any signs of decreased circulation or nerve impairment to extremity: change in color, persistent  numbness, tingling, coldness or increase pain  · Any questions    What to do at Home:  Recommended activity: Activity as tolerated and no driving for today. *  Please give a list of your current medications to your Primary Care Provider. *  Please update this list whenever your medications are discontinued, doses are      changed, or new medications (including over-the-counter products) are added. *  Please carry medication information at all times in case of emergency situations. These are general instructions for a healthy lifestyle:    No smoking/ No tobacco products/ Avoid exposure to second hand smoke  Surgeon General's Warning:  Quitting smoking now greatly reduces serious risk to your health. Obesity, smoking, and sedentary lifestyle greatly increases your risk for illness    A healthy diet, regular physical exercise & weight monitoring are important for maintaining a healthy lifestyle    You may be retaining fluid if you have a history of heart failure or if you experience any of the following symptoms:  Weight gain of 3 pounds or more overnight or 5 pounds in a week, increased swelling in our hands or feet or shortness of breath while lying flat in bed. Please call your doctor as soon as you notice any of these symptoms; do not wait until your next office visit. The discharge information has been reviewed with the patient. The patient verbalized understanding. Discharge medications reviewed with the patient and appropriate educational materials and side effects teaching were provided. ___________________________________________________________________________________________________________________________________      Patient to resume coumadin full dose this PM as regularly scheduled and daily as he normal takes the medication. Recommend Lovenox shots today (12/7/2020) and tomorrow (12/8/2020) at 9PM and then he can discontinue Lovenox injections. Patient Education        Rotator Cuff Repair: What to Expect at [facility] cuff repair surgery is done to fix a tear in the rotator cuff. It can also include cleaning the space between the rotator cuff tendons and the shoulder blade. This is called subacromial smoothing. You will feel tired for several days. Your shoulder will be swollen. And you may notice that your skin is a different color near the cut (incision). Your hand and arm may also be swollen. This is normal and will start to get better in a few days. It will be several months before you have complete use of your shoulder and arm. When you have healed from surgery, you will need to build your strength and the motion of your joint with rehabilitation (rehab) exercises. In time, your shoulder will likely be stronger, less painful, and more flexible than it was before the surgery. This care sheet gives you a general idea about how long it will take for you to recover. But each person recovers at a different pace. Follow the steps below to get better as quickly as possible. How can you care for yourself at home? Activity    · Rest when you feel tired. Getting enough sleep will help you recover.  Do not lie flat or sleep on your side. Raise your upper body on two or three pillows, or sleep in a reclining chair.     · Try to walk each day. Start by walking a little more than you did the day before. Bit by bit, increase the amount you walk. Walking boosts blood flow and helps prevent pneumonia and constipation.     · Your arm will be in a sling or other device to prevent it from moving for 4 to 8 weeks. ? Always use the sling when you walk or stand. ? If you sit or lie down, you can loosen the sling, but don't remove it. This lets your elbow straighten without moving the shoulder. You can also support your arm on a pillow. ? Remove the sling only to do prescribed exercises or to shower.     · You will not have complete use of your affected arm for 3 to 4 months after surgery. ? You can use your affected arm for writing, eating, or drinking, but move it only at the elbow or wrist. Do not use it for anything else except prescribed exercises until the sling has been removed. ? When the sling has been removed, you can do activities that don't involve lifting, pushing, pulling, or carrying. You may not be able to do overhead lifting for 6 to 12 months.     · If you have a desk job, you will probably be able to go back to work or your normal routine in 1 to 2 weeks. If you have a more active job, you may be away from work for 3 to 4 months or longer. If you work at a job that involves heavy manual labor, lifting your arms above your head, or the use of heavy tools, you may have to think about making changes to your job.     · If you had arthroscopic surgery, you can take a shower 48 to 72 hours after surgery. Remove the sling, and leave your arm by your side. To wash under your armpit, lean over and let the arm fall away from your body. Do not raise your arm. You may want to use a shower stool for a day or two.     · If you had open surgery, do not shower until you see your doctor and he or she okays it.  You can wash the incisions with regular soap and water.     · Ask your doctor when you can drive again. This may take about 6 weeks or until you are no longer wearing the sling. Diet    · You can eat your normal diet. If your stomach is upset, try bland, low-fat foods like plain rice, broiled chicken, toast, and yogurt. Drink plenty of fluids.     · You may notice that your bowel movements are not regular right after your surgery. This is common. Try to avoid constipation and straining with bowel movements. You may want to take a fiber supplement every day. If you have not had a bowel movement after a couple of days, ask your doctor about taking a mild laxative. Medicines    · Your doctor will tell you if and when you can restart your medicines. He or she will also give you instructions about taking any new medicines.     · If you take aspirin or some other blood thinner, ask your doctor if and when to start taking it again. Make sure that you understand exactly what your doctor wants you to do.     · Take pain medicines exactly as directed. ? If the doctor gave you a prescription medicine for pain, take it as prescribed. Use pain medicine when you first notice pain, before it becomes severe. It's easier to prevent pain early than to stop it after it gets bad.  ? If you are not taking a prescription pain medicine, ask your doctor if you can take an over-the-counter medicine.     · If your doctor prescribed antibiotics, take them as directed. Do not stop taking them just because you feel better. You need to take the full course of antibiotics.     · If you think your pain medicine is making you sick to your stomach:  ? Take your medicine after meals (unless your doctor has told you not to). ? Ask your doctor for a different pain medicine. Incision care    · If you had arthroscopic surgery, you may remove the bandage over your cut (incision) 24 to 48 hours after the surgery.  Keep the bandage clean and dry.     · If you had open surgery, do not remove your bandage until you see your doctor and he or she okays it. Keep the bandage clean and dry.     · If your incision is open to the air, keep the area clean and dry.     · If you have strips of tape on the incision, leave the tape on for a week or until it falls off. Exercise    · Shoulder rehabilitation is a series of exercises you do after your surgery. This helps you get back your shoulder's range of motion and strength. You will work with your doctor and physical therapist to plan this exercise program. Rehab may not start until 6 weeks after the surgery. To get the best results, you need to do the exercises correctly and as often and for as long as your doctor tells you. Ice    · To reduce swelling and pain, put ice or a cold pack on your shoulder for 10 to 20 minutes at a time. Do this every 1 to 2 hours. Put a thin cloth between the ice and your skin. Follow-up care is a key part of your treatment and safety. Be sure to make and go to all appointments, and call your doctor if you are having problems. It's also a good idea to know your test results and keep a list of the medicines you take. When should you call for help? Call 911 anytime you think you may need emergency care. For example, call if:    · You passed out (lost consciousness).     · You have severe trouble breathing.     · You have sudden chest pain and shortness of breath, or you cough up blood. Call your doctor now or seek immediate medical care if:    · You have numbness, tingling, or a bluish color in your fingers or hand.     · You have severe nausea or vomiting.     · You have pain that does not go away after you take pain medicine.     · You have loose stitches, or your incision comes open.     · Your incision bleeds through your first bandage or is still bleeding 3 days after your surgery.     · You have signs of infection, such as:  ? Increased pain, swelling, warmth, or redness.   ? Red streaks leading from the incision. ? Pus draining from the incision. ? A fever. Watch closely for any changes in your health, and be sure to contact your doctor if:    · You do not have a bowel movement after taking a laxative. Where can you learn more? Go to http://www.gray.com/  Enter E652 in the search box to learn more about \"Rotator Cuff Repair: What to Expect at Home. \"  Current as of: March 2, 2020               Content Version: 12.6  © 2006-2020 [employer]. Care instructions adapted under license by [employer] (which disclaims liability or warranty for this information). If you have questions about a medical condition or this instruction, always ask your healthcare professional. [address] any warranty or liability for your use of this information.

## 2020-12-07 NOTE — OP NOTES
[address] [place] [address]   OPERATIVE REPORT     Patient Name: Eliot Jacobson  Medical Record Number:  [id number]  YOB: 1955  ACCOUNT #:  [de-identified]  DATE OF SERVICE: 12/7/2020     PREOPERATIVE DIAGNOSES:  1. Left shoulder rotator cuff tear  2. Left shoulder long head biceps tear     POSTOPERATIVE DIAGNOSES:  1. Left shoulder rotator cuff tear, supraspinatus delaminated  2. Left shoulder long head biceps tear with residual stump at the superior labrum     PROCEDURES PERFORMED:  1. Left shoulder arthroscopic rotator cuff repair  2. Left shoulder arthroscopic glenohumeral joint debridement, SLAP tear and biceps stump debridement     SURGEON:  Maycol Rebolledo. Parveen Desai MD     ASSISTANT:   ESPERANZA Toledo PA-C was medically necessary for the procedure. She assisted in patient positioning, rotator cuff repair, glenohumeral joint debridement, wound closure, placement of dressing and sling, and transfer the patient to the PACU. ANESTHESIA:   General with nerve block. COMPLICATIONS:   None. SPECIMENS REMOVED:  None. IMPLANTS:   Arthrex suture anchors. ESTIMATED BLOOD LOSS:   Minimal.     IV FLUIDS:   700 cc LR. INDICATIONS FOR PROCEDURE:   Eliot Jacobson is a 47-year-old male who injured his left shoulder at work. An MRI revealed a full-thickness tear of the rotator cuff as well as tearing of the long head of the biceps. After discussing the treatment options at length elected undergo the procedure as described. PROCEDURE:   Patient was identified in the preoperative holding area. The left shoulder was marked as the upper extremity after confirmation with the patient. After discussing risk benefits of procedure informed consent was confirmed. Anesthesia performed a nerve block in the preoperative holding area. The patient was then taken to the operating room. He was moved from the stretcher to the OR table.   General anesthesia was induced and he was intubated. He was brought to the beachchair position. The left arm and shoulder were prepped and draped in normal sterile fashion. A timeout was performed verifying the correct patient procedure and upper extremities on agreement. Antibiotics were given through the IV prior to skin incision. The surgery began with a posterior portal placement. The arthroscope was brought into the glenohumeral joint. An anterior portal was made in the rotator interval under spinal needle localization. Synovitis as well as tearing of the biceps was noted. There was also a tear of the supraspinatus noted. There are minimal arthritic changes noted in the glenohumeral joint. Approximately 2 cm of biceps stump remains on the superior labrum. This was debrided. Anteriorly the subscapularis as well as posteriorly the infraspinatus were noted to be intact. The rotator interval was also debrided. Superior to the superior labrum was also debrided and the rotator cuff supraspinatus tendon was released from adhesions in this area. The scope was then brought into the subacromial space. Lateral portal as well as posterior lateral portal were made under spinal needle localization. Working in the subacromial space a subacromial bursectomy was performed. The edges of the rotator cuff tear were also debrided. The rotator cuff tear was noted to be delaminated and adhesions were released inferior and superior to the supraspinatus tear allowing for adequate mobilization to the footprint of the greater tuberosity. The footprint of the greater tuberosity was then debrided using a bur to bleeding bone. Through a separate incision off the anterior lateral aspect of the acromion suture anchors were placed at the articular margin of the greater tuberosity 1 anterior and one posterior. The suture tails were shuttled through the rotator cuff tear from posterior to anterior.   A traction suture was placed at the midpoint of the tear and pulled laterally while the sutures were tied down. This concluded the medial row repair. One tail from each suture limb was then brought to a separate anterior and posterior lateral anchor completing the double row repair. Subacromial space was again debrided of all loose bony and soft tissue debris. Final pictures were taken. Scope instruments were removed. The shoulder was drained. The portal sites were closed in standard fashion. A sterile dressing was placed over the left shoulder. The drapes taken down. The patient was placed in a sling. He was awake from anesthesia, extubated, and taken to PACU in stable condition with a plan for discharge home.      Electronically Signed Up   Dre Palmer MD  12/07/20  2:54 PM

## 2020-12-07 NOTE — INTERVAL H&P NOTE
Update History & Physical    The Patient's History and Physical of November 18, 2020    was reviewed with the patient and I examined the patient. There was no change. The surgical site was confirmed by the patient and me. Plan:  The risk, benefits, expected outcome, and alternative to the recommended procedure have been discussed with the patient. Patient understands and wants to proceed with the procedure.     Electronically signed by Christian Wilson MD on 12/7/2020 at 11:55 AM

## 2020-12-07 NOTE — BRIEF OP NOTE
Brief Postoperative Note    Patient: Shanon Rubio  YOB: 1955  MRN: [id number]    Date of Procedure: 12/7/2020     Pre-Op Diagnosis: S46.012A LEFT ROTATOR CUFF TEAR  S32.248L LEFT BICEPS TEAR    Post-Op Diagnosis: Same as preoperative diagnosis. Procedure(s):  LEFT SHOULDER ARTHROSCOPIC ROTATOR CUFF REPAIR/GLENOHUMERAL JOINT DEBRIDEMENT/ARTHREX/SPIDER/TMAX    Surgeon(s):  Obinna Patterson MD    Surgical Assistant: Physician Assistant: CARA Simmons  Surg Asst-1: Jarek Ballard    Anesthesia: General     Estimated Blood Loss (mL): Minimal    Complications: None    Specimens: * No specimens in log *     Implants:   Implant Name Type Inv.  Item Serial No.  Lot No. LRB No. Used Action   ANCHOR SUT FT CRKSCR 5.5MM -- W/SUTURETAPE BIOCOMPOSITE - [id number]  ANCHOR SUT FT CRKSCR 5.5MM -- W/SUTURETAPE BIOCOMPOSITE  [employer]WD [id number] Left 2 Implanted   Derek [place] SWIVELOCK 4.75MM - [id number]  Cora [place] 4.75MM  [address] [employer]WD [id number] Left 2 Implanted       Drains:   [REMOVED] Abdiaziz-Larios Drain 03/27/18 Left Knee (Removed)       [REMOVED] Orogastric Tube 11/12/18 (Removed)       [REMOVED] Rectal Tube (Removed)       Findings: Left shoulder rotator cuff tear, supraspinatus delaminated  Biceps long head tear with residual stump at superior labrum    Electronically Signed by Luster Galeazzi, MD on 12/7/2020 at 2:53 PM

## 2020-12-07 NOTE — PROGRESS NOTES
Patient to resume coumadin full dose this PM as regularly scheduled and daily as he normal takes the medication. Recommend Lovenox shots today (12/7/2020) and tomorrow (12/8/2020) at 9PM and then he can discontinue Lovenox injections.

## 2020-12-07 NOTE — ANESTHESIA PREPROCEDURE EVALUATION
Anesthetic History   No history of anesthetic complications            Review of Systems / Medical History  Patient summary reviewed and pertinent labs reviewed    Pulmonary  Within defined limits                 Neuro/Psych   Within defined limits           Cardiovascular    Hypertension: well controlled              Exercise tolerance: >4 METS     GI/Hepatic/Renal     GERD: well controlled           Endo/Other        Arthritis and cancer     Other Findings              Physical Exam    Airway  Mallampati: II  TM Distance: 4 - 6 cm  Neck ROM: normal range of motion   Mouth opening: Normal     Cardiovascular    Rhythm: regular  Rate: normal         Dental  No notable dental hx       Pulmonary  Breath sounds clear to auscultation               Abdominal  GI exam deferred       Other Findings            Anesthetic Plan    ASA: 3  Anesthesia type: general - interscalene block      Post-op pain plan if not by surgeon: peripheral nerve block single    Induction: Intravenous  Anesthetic plan and risks discussed with: Patient

## 2020-12-08 ENCOUNTER — TELEPHONE (OUTPATIENT)
Dept: ORTHOPEDIC SURGERY | Age: 65
End: 2020-12-08

## 2020-12-08 DIAGNOSIS — S46.012A TRAUMATIC COMPLETE TEAR OF LEFT ROTATOR CUFF, INITIAL ENCOUNTER: ICD-10-CM

## 2020-12-08 DIAGNOSIS — S46.112A RUPTURE OF LEFT LONG HEAD BICEPS TENDON, INITIAL ENCOUNTER: ICD-10-CM

## 2020-12-08 DIAGNOSIS — Z01.810 PRE-OPERATIVE CARDIOVASCULAR EXAMINATION: ICD-10-CM

## 2020-12-08 NOTE — TELEPHONE ENCOUNTER
[phone] Cristina Ill    They need to know how to put the sling on. She states he was rushed out of the hospital yesterday and they want to make sure it is on correctly. He is in severe pain. They wanted to come in \"to see the nurse\" and wanted to know where she would be located to come by the office. I explained an appt would be needed. She is asking if they should go to the ED. Please advise.

## 2020-12-08 NOTE — TELEPHONE ENCOUNTER
Called and spoke to patient's wife. I advised that if they would like, they can come by the office to have me look at the sling.

## 2020-12-15 NOTE — PROGRESS NOTES
Tayla Major  1955     HISTORY OF PRESENT ILLNESS  Tayla Major is a 77 y.o. male who presents today for evaluation s/p Left shoulder arthroscopic rotator cuff repair, glenohumeral joint debridement, SLAP tear and biceps stump debridement on 12/7/20. Patient has not been going to PT. Describes pain as a 4/10. Has been taking tylenol for pain. Still has night pain. He has been compliant with his sling and restrictions. Patient denies any fever, chills, chest pain, shortness of breath or calf pain. There are no new illness or injuries to report since last seen in the office. PHYSICAL EXAM:   Visit Vitals  /67 (BP 1 Location: Right arm, BP Patient Position: Sitting)   Pulse 79   Temp 96.9 °F (36.1 °C) (Temporal)   Resp 16   Ht 5' 10\" (1.778 m)   Wt 231 lb 12.8 oz (105.1 kg)   SpO2 98%   BMI 33.26 kg/m²      The patient is a well-developed, well-nourished male in no acute distress. The patient is alert and oriented times three. The patient appears to be well groomed. Mood and affect are normal.  ORTHOPEDIC EXAM of left shoulder:  Inspection: swelling present,  Bruising not present  Incision, clean, dry, intact, sutures in place  Passive glenohumeral abduction 0-20 degrees in the sling otherwise not assessed  Stability: Stable  Strength: n/a  2+ distal pulses    IMPRESSION:  s/p Left shoulder arthroscopic rotator cuff repair, glenohumeral joint debridement, SLAP tear and biceps stump debridement    PLAN:   Pt doing well post operatively  Incisions cleaned. Sutures removed and steri strips applied  Surgery was discussed at length today. Continue wearing sling until 4 weeks post op. Will start exercises and PT at next visit. Stressed to patient that nothing causes an increase in pain.   RTC 3 weeks    ESPERANZA [facility] Specialist

## 2020-12-16 ENCOUNTER — OFFICE VISIT (OUTPATIENT)
Dept: ORTHOPEDIC SURGERY | Age: 65
End: 2020-12-16
Payer: OTHER MISCELLANEOUS

## 2020-12-16 VITALS
WEIGHT: 231.8 LBS | DIASTOLIC BLOOD PRESSURE: 67 MMHG | SYSTOLIC BLOOD PRESSURE: 129 MMHG | OXYGEN SATURATION: 98 % | HEIGHT: 70 IN | BODY MASS INDEX: 33.18 KG/M2 | HEART RATE: 79 BPM | RESPIRATION RATE: 16 BRPM | TEMPERATURE: 96.9 F

## 2020-12-16 DIAGNOSIS — S46.012A TRAUMATIC COMPLETE TEAR OF LEFT ROTATOR CUFF, INITIAL ENCOUNTER: Primary | ICD-10-CM

## 2020-12-16 DIAGNOSIS — S46.112A RUPTURE OF LEFT LONG HEAD BICEPS TENDON, INITIAL ENCOUNTER: ICD-10-CM

## 2020-12-16 DIAGNOSIS — Z98.890 STATUS POST ARTHROSCOPY OF LEFT SHOULDER: ICD-10-CM

## 2020-12-16 PROCEDURE — 99024 POSTOP FOLLOW-UP VISIT: CPT | Performed by: ORTHOPAEDIC SURGERY

## 2020-12-16 NOTE — LETTER
NOTIFICATION RETURN TO WORK / SCHOOL 
 
12/16/2020 2:59 PM 
 
Mr. Rupert Landaverde [address] [address] To Whom It May Concern: 
 
Rupert Landaverde is currently under the care of [address] [place] [address]. He will be out of work for the next six weeks. If there are questions or concerns please have the patient contact our office.  
 
 
 
Sincerely, 
 
 
CARA Gaines

## 2020-12-28 NOTE — PROGRESS NOTES
[address]  [address], [address]  [place], [address] [place] [address] Street  Phone: [phone]  Fax: [phone]    Lambert [place]  : 1955  PCP: Socorro Trejo MD    PROGRESS NOTE    HISTORY OF PRESENT ILLNESS:  Chief Complaint   Patient presents with    Back Pain     Leah Collet is a 77 y.o.  male with history of lumbar pain. He was last seen with Dr. Lucile Merlin. He was to continue Neurontin 100 mg 2 caps QHS and skelaxin. Pt is not a candidate for NSAID's due to anticoagulation with Coumadin. He was prescribed a MDP. Today, he states he had rotator cuff surgery 3 weeks ago. His back has been doing pretty good. It flared up a bit over the holidays. He has a knot in his back that he uses a theracane. Denies bladder/bowel dysfunction, saddle paresthesia, weakness, gait disturbance, or other neurological deficit. Pt at this time desires to  continue with current care/proceed with medication evaluation. LMRI 2018  IMPRESSION:    1. Some progression of degenerative disc and facet disease at L3-4, with  moderate spinal stenosis and potentially impingement of the crossing L4 nerves,  left more than right. 2. Stable postsurgical and degenerative findings at L4-5 and L5-S1.                CMRI 2016  Impression:    Disc osteophyte complexes causing mild spinal canal stenoses at C5-6 and C6-7. Moderate left foraminal stenosis at C5-6 and moderate bilateral foraminal  stenoses at C6-7.     ASSESSMENT  77 y.o. male with back pain. Diagnoses and all orders for this visit:    1. Facet arthropathy, lumbar    2. Spinal stenosis of lumbar region without neurogenic claudication    3. Neuropathy         IMPRESSION/PLAN    1) Pt was given information on back exercises. 2) start gabapentin, can call for refills when needed  3) cont skelaxin  4) Mr. Birder Cockayne has a reminder for a \"due or due soon\" health maintenance.  I have asked that he contact his primary care provider, Alicia Barrios MD, for follow-up on this health maintenance. 5) We have informed patient to notify us for immediate appointment if he has any worsening neurogical symptoms or if an emergency situation presents, then call 911  5) Pt will follow-up in 3 months for med fu. Risks and benefits of ongoing therapy have been reviewed with the patient.  is appropriate. . No pain behaviors. Denies thoughts of harming self or others. Pt has a good risk to benefit ratio which allows the pt to function in a home environment without side effects. PAST MEDICAL HISTORY  Past Medical History:   Diagnosis Date    Arthritis     Bleeding     Chronic pain     knee and shoulder    GERD (gastroesophageal reflux disease)     Headache(784.0)     migraine    High cholesterol     Hypertension     Lower back pain 11/6/2010    Other chest pain     Personal history of prostate cancer     Pure hypercholesterolemia     Right buttock pain 11/6/2010    Right foot pain     Rotator cuff tear     left-since 2010, worsened tear Jan.    Rotator cuff tear, right     Spinal stenosis     Tendonitis, tibialis     anterior    Thromboembolus ([facility] [address]     3 after sx last one 2000        MEDICATIONS  Current Outpatient Medications   Medication Sig Dispense Refill    L gasseri/B bifidum/B longum ([facility] [address]) Take  by mouth nightly.  diclofenac (VOLTAREN) 1 % gel Apply 4 g to affected area four (4) times daily. (Patient taking differently: Apply 4 g to affected area two (2) times a day.) 100 g 3    metaxalone (Skelaxin) 800 mg tablet Take 1 tab by mouth BID-TID prn muscle spasm 60 Tab 1    lansoprazole (PREVACID) 30 mg capsule Take 30 mg by mouth daily.  celecoxib (CELEBREX) 200 mg capsule TAKE ONE CAPSULE BY MOUTH TWICE DAILY.  (Patient taking differently: 200 mg daily.) 60 Cap 2    warfarin (COUMADIN) 5 mg tablet TAKE 2 AND 1/2 TABLETS BY MOUTH DAILY OR AS DIRECTED (Patient taking differently: Take by mouth daily. TAKE 2 AND 1/2 TABLETS BY MOUTH TUES, THURS, SAT or as directed) 75 Tab 0    lisinopril-hydroCHLOROthiazide (PRINZIDE, ZESTORETIC) 20-12.5 mg per tablet TAKE 1 TABLET BY MOUTH DAILY (Patient taking differently: Take 1 Tab by mouth daily. TAKE 1 TABLET BY MOUTH DAILY) 90 Tab 1    labetalol (NORMODYNE) 100 mg tablet TAKE 1 TABLET BY MOUTH TWICE DAILY. STOP VERAPAMIL (Patient taking differently: Take  by mouth two (2) times a day.) 180 Tab 0    butalbital-acetaminophen-caff (FIORICET) -40 mg per capsule 2 cap three times per day as needed for headache (Patient taking differently: Take  by mouth daily. 2 cap three times per day as needed for headache) 180 Cap 1    ALPRAZolam (XANAX) 0.5 mg tablet Take one half(1/2) tab to one(1) tab by mouth at bedtime as needed for sleep (Patient taking differently: Take  by mouth nightly as needed. Take one half(1/2) tab to one(1) tab by mouth at bedtime as needed for sleep) 30 Tab 0    montelukast (SINGULAIR) 10 mg tablet TAKE 1 TABLET BY MOUTH EVERY DAY (Patient taking differently: Take 10 mg by mouth nightly.) 90 Tab 0    acetaminophen (TYLENOL) 500 mg tablet Take 1,000 mg by mouth every six (6) hours as needed for Pain. ALLERGIES  Allergies   Allergen Reactions    Amoxicillin Itching    Augmentin [Amoxicillin-Pot Clavulanate] Itching    Chlorhexidine Towelette Itching    Hibiclens [Chlorhexidine Gluconate] Itching    Milk Containing Products Diarrhea    Nsaids (Non-Steroidal Anti-Inflammatory Drug) Other (comments)     On blood thinner, contraindicated.      Penicillins Rash    Requip [Ropinirole] Nausea and Vomiting       SOCIAL HISTORY    Social History     Socioeconomic History    Marital status:      Spouse name: Not on file    Number of children: Not on file    Years of education: Not on file    Highest education level: Not on file   Occupational History    Not on file   Social Needs    Financial resource strain: Not on file    Food insecurity     Worry: Not on file     Inability: Not on file    Transportation needs     Medical: Not on file     Non-medical: Not on file   Tobacco Use    Smoking status: Never Smoker    Smokeless tobacco: Never Used   Substance and Sexual Activity    Alcohol use: No    Drug use: No    Sexual activity: Not Currently   Lifestyle    Physical activity     Days per week: Not on file     Minutes per session: Not on file    Stress: Not on file   Relationships    Social connections     Talks on phone: Not on file     Gets together: Not on file     Attends Muslim service: Not on file     Active member of club or organization: Not on file     Attends meetings of clubs or organizations: Not on file     Relationship status: Not on file    Intimate partner violence     Fear of current or ex partner: Not on file     Emotionally abused: Not on file     Physically abused: Not on file     Forced sexual activity: Not on file   Other Topics Concern     Service Not Asked    Blood Transfusions Not Asked    Caffeine Concern Not Asked    Occupational Exposure Not Asked   Que Ritter Hazards Not Asked    Sleep Concern Not Asked    Stress Concern Not Asked    Weight Concern Not Asked    Special Diet Not Asked    Back Care Not Asked    Exercise Not Asked    Bike Helmet Not Asked    Seat Belt Not Asked    Self-Exams Not Asked   Social History Narrative    Not on file       SUBJECTIVE        Pain Scale: 1/10    Pain Assessment  12/29/2020   Location of Pain Back   Location Modifiers -   Severity of Pain 1   Quality of Pain Dull   Quality of Pain Comment -   Duration of Pain -   Duration of Pain Comment -   Frequency of Pain Constant   Date Pain First Started -   Aggravating Factors -   Aggravating Factors Comment -   Limiting Behavior -   Relieving Factors -   Relieving Factors Comment -   Result of Injury -   Work-Related Injury -   Type of Injury -   Type of Injury Comment -       Accompanied by self.    REVIEW OF SYSTEMS  ROS    Constitutional: Negative for fever, chills, or weight change. Respiratory: Negative for cough or shortness of breath. Cardiovascular: Negative for chest pain or palpitations. Gastrointestinal: Negative for acid reflux, change in bowel habits, or constipation. Genitourinary: Negative for incontinence, dysuria and flank pain. Musculoskeletal: Positive for back pain. Skin: Negative for rash. Neurological: Negative for headaches, dizziness, or numbness. Endo/Heme/Allergies: Negative . Psychiatric/Behavioral: Negative. PHYSICAL EXAMINATION  Visit Vitals  BP (!) 144/79 (BP 1 Location: Right arm, BP Patient Position: Sitting)   Pulse 76   Temp 98.5 °F (36.9 °C) (Oral)   Ht 5' 10\" (1.778 m)   Wt 234 lb 12.8 oz (106.5 kg)   SpO2 95%   BMI 33.69 kg/m²       Constitutional: Well developed,  well nourished,  awake, alert, and in no acute distress. Neurological:  Sensation to light touch is intact. Psychiatric: Affect and mood are appropriate. Integumentary: No rashes or abrasions noted on exposed areas,  warm, dry and intact. Cardiovascular/Peripheral Vascular:  No peripheral edema is noted. Lymphatic:  No evidence of lymphedema. No cervical lymphadenopathy. SPINE/MUSCULOSKELETAL EXAM         Lumbar spine:  No rash, ecchymosis, or gross obliquity. No fasciculations. No focal atrophy is noted. Range of motion is intact. Tenderness to palpation to low back. SI joints non-tender. Trochanters non tender. Musculoskeletal:  No pain with extension, axial loading, or forward flexion. No pain with internal or external rotation of his hips. MOTOR     Hip Flex  Quads Hamstrings Ankle DF EHL Ankle PF   Right +4/5 +4/5 +4/5 +4/5 +4/5 +4/5   Left +4/5 +4/5 +4/5 +4/5 +4/5 +4/5     Straight Leg raise - bilaterally. normal gait and station    Ambulation without assistive device. full weight bearing, non-antalgic gait.     Kei Osorio NP

## 2020-12-29 ENCOUNTER — OFFICE VISIT (OUTPATIENT)
Dept: ORTHOPEDIC SURGERY | Age: 65
End: 2020-12-29
Payer: MEDICARE

## 2020-12-29 VITALS
WEIGHT: 234.8 LBS | BODY MASS INDEX: 33.61 KG/M2 | HEART RATE: 76 BPM | OXYGEN SATURATION: 95 % | SYSTOLIC BLOOD PRESSURE: 144 MMHG | HEIGHT: 70 IN | TEMPERATURE: 98.5 F | DIASTOLIC BLOOD PRESSURE: 79 MMHG

## 2020-12-29 DIAGNOSIS — G62.9 NEUROPATHY: ICD-10-CM

## 2020-12-29 DIAGNOSIS — M48.061 SPINAL STENOSIS OF LUMBAR REGION WITHOUT NEUROGENIC CLAUDICATION: ICD-10-CM

## 2020-12-29 DIAGNOSIS — M47.816 FACET ARTHROPATHY, LUMBAR: Primary | ICD-10-CM

## 2020-12-29 PROCEDURE — 99213 OFFICE O/P EST LOW 20 MIN: CPT | Performed by: NURSE PRACTITIONER

## 2021-01-01 NOTE — TELEPHONE ENCOUNTER
Patient wife called asking for an antibotic for him because he has a cough, congestion, brown phelm. Please call in something.  [facility] No

## 2021-01-08 ENCOUNTER — OFFICE VISIT (OUTPATIENT)
Dept: ORTHOPEDIC SURGERY | Age: 66
End: 2021-01-08
Payer: OTHER MISCELLANEOUS

## 2021-01-08 VITALS
SYSTOLIC BLOOD PRESSURE: 139 MMHG | WEIGHT: 233.8 LBS | BODY MASS INDEX: 33.47 KG/M2 | RESPIRATION RATE: 16 BRPM | HEIGHT: 70 IN | TEMPERATURE: 97.7 F | HEART RATE: 68 BPM | DIASTOLIC BLOOD PRESSURE: 73 MMHG | OXYGEN SATURATION: 99 %

## 2021-01-08 DIAGNOSIS — Z98.890 STATUS POST ARTHROSCOPY OF LEFT SHOULDER: ICD-10-CM

## 2021-01-08 DIAGNOSIS — S46.012A TRAUMATIC COMPLETE TEAR OF LEFT ROTATOR CUFF, INITIAL ENCOUNTER: Primary | ICD-10-CM

## 2021-01-08 DIAGNOSIS — S46.112A RUPTURE OF LEFT LONG HEAD BICEPS TENDON, INITIAL ENCOUNTER: ICD-10-CM

## 2021-01-08 PROCEDURE — 99024 POSTOP FOLLOW-UP VISIT: CPT | Performed by: ORTHOPAEDIC SURGERY

## 2021-01-08 NOTE — LETTER
NOTIFICATION RETURN TO WORK / SCHOOL 
 
1/8/2021 3:22 PM 
 
Mr. Mary Grace Parson [address] [facility] [address] To Whom It May Concern: 
 
Mary Grace Parson is currently under the care of [address] [place] [address]. He will remain out of work for the next 4 weeks. He will be re evaluated at that time. If there are questions or concerns please have the patient contact our office.  
 
 
 
Sincerely, 
 
 
CARA Orta

## 2021-01-08 NOTE — PROGRESS NOTES
Annamaria Austin  1955     HISTORY OF PRESENT ILLNESS  Annamaria Austin is a 77 y.o. male who presents today for evaluation s/p Left shoulder arthroscopic rotator cuff repair, glenohumeral joint debridement, SLAP tear and biceps stump debridement on 12/7/20. Patient has not been going to PT. Describes pain as a 2/10. Has been taking tylenol for pain. Still has night pain. He has been compliant with his sling and restrictions. Patient denies any fever, chills, chest pain, shortness of breath or calf pain. There are no new illness or injuries to report since last seen in the office. PHYSICAL EXAM:   Visit Vitals  /73 (BP 1 Location: Right arm, BP Patient Position: Sitting)   Pulse 68   Temp 97.7 °F (36.5 °C) (Temporal)   Resp 16   Ht 5' 10\" (1.778 m)   Wt 233 lb 12.8 oz (106.1 kg)   SpO2 99%   BMI 33.55 kg/m²      The patient is a well-developed, well-nourished male in no acute distress. The patient is alert and oriented times three. The patient appears to be well groomed. Mood and affect are normal.  ORTHOPEDIC EXAM of left shoulder:  Inspection: swelling present,  Bruising not present  Incision, clean, dry, intact, sutures in place  Passive glenohumeral abduction 0-40 degrees, 50 PFF, 10 PER  Stability: Stable  Strength: n/a  2+ distal pulses    IMPRESSION:  s/p Left shoulder arthroscopic rotator cuff repair, glenohumeral joint debridement, SLAP tear and biceps stump debridement    PLAN:   Pt doing well post operatively  He is having a little discomfort but overall doing well. D/C sling today. Will start exercises and PT now. Orders and exercises given in the office. Stressed to patient that nothing causes an increase in pain. Pt not given a refill of pain medication today.    Pt given a note to remain out of work until next visit in 4 weeks  RTC 4 weeks    Da Ramirez [facility] and Spine Specialist

## 2021-02-03 NOTE — PROGRESS NOTES
Christiano Riley  1955     HISTORY OF PRESENT ILLNESS  Christiano Riley is a 77 y.o. male who presents today for evaluation s/p Left shoulder arthroscopic rotator cuff repair, glenohumeral joint debridement, SLAP tear and biceps stump debridement on 12/7/20. Patient has been going to PT. Describes pain as a 3/10. Has been taking tylenol for pain. Still has night pain. He has been compliant with his exercises and restrictions. He is a little sore in the mornings and after PT but making good progress. His discomfort is not every day and has improved since last visit. Patient denies any fever, chills, chest pain, shortness of breath or calf pain. There are no new illness or injuries to report since last seen in the office. PHYSICAL EXAM:   Visit Vitals  /75 (BP 1 Location: Right upper arm, BP Patient Position: Sitting, BP Cuff Size: Large adult)   Pulse 88   Temp 97.5 °F (36.4 °C) (Skin)   Ht 5' 10\" (1.778 m)   Wt 237 lb (107.5 kg)   SpO2 98%   BMI 34.01 kg/m²      The patient is a well-developed, well-nourished male in no acute distress. The patient is alert and oriented times three. The patient appears to be well groomed. Mood and affect are normal.  ORTHOPEDIC EXAM of left shoulder:  Inspection: swelling not present,  Bruising not present  Incision well healed  Passive glenohumeral abduction 0-90 degrees, 130 PFF, 30 PER  Stability: Stable  Strength: n/a  2+ distal pulses    IMPRESSION:  s/p Left shoulder arthroscopic rotator cuff repair, glenohumeral joint debridement, SLAP tear and biceps stump debridement    PLAN:   Pt doing well post operatively  He is having a little discomfort but overall doing well. Will continue exercises and PT now. Another order was placed today. Stressed to patient that nothing causes an increase in pain. Pt not given a refill of pain medication today.    Pt given a note to remain out of work until next visit in 4 weeks  RTC 4 weeks    Lucero Tanner, [address] [facility] Spine Specialist

## 2021-02-05 ENCOUNTER — OFFICE VISIT (OUTPATIENT)
Dept: ORTHOPEDIC SURGERY | Age: 66
End: 2021-02-05
Payer: OTHER MISCELLANEOUS

## 2021-02-05 VITALS
SYSTOLIC BLOOD PRESSURE: 138 MMHG | DIASTOLIC BLOOD PRESSURE: 75 MMHG | WEIGHT: 237 LBS | BODY MASS INDEX: 33.93 KG/M2 | OXYGEN SATURATION: 98 % | TEMPERATURE: 97.5 F | HEART RATE: 88 BPM | HEIGHT: 70 IN

## 2021-02-05 DIAGNOSIS — S46.012A TRAUMATIC COMPLETE TEAR OF LEFT ROTATOR CUFF, INITIAL ENCOUNTER: Primary | ICD-10-CM

## 2021-02-05 DIAGNOSIS — Z98.890 STATUS POST ARTHROSCOPY OF LEFT SHOULDER: ICD-10-CM

## 2021-02-05 DIAGNOSIS — S46.112A RUPTURE OF LEFT LONG HEAD BICEPS TENDON, INITIAL ENCOUNTER: ICD-10-CM

## 2021-02-05 PROCEDURE — 99024 POSTOP FOLLOW-UP VISIT: CPT | Performed by: ORTHOPAEDIC SURGERY

## 2021-02-05 RX ORDER — GABAPENTIN 100 MG/1
100 CAPSULE ORAL ONCE
COMMUNITY
End: 2021-02-08

## 2021-02-05 NOTE — LETTER
NOTIFICATION RETURN TO WORK / SCHOOL 
 
2/5/2021 2:11 PM 
 
Mr. Annamaria [place] [address] [facility] [address] To Whom It May Concern: 
 
Annamaria Austin is currently under the care of [address] [place] [address]. He will remain off work until his next appointment in  4 weeks and will be re-evaluated at that time. If there are questions or concerns please have the patient contact our office.  
 
 
 
Sincerely, 
 
 
CARA Ramirez

## 2021-02-08 DIAGNOSIS — M47.816 FACET ARTHROPATHY, LUMBAR: ICD-10-CM

## 2021-02-08 DIAGNOSIS — G62.9 NEUROPATHY: ICD-10-CM

## 2021-02-08 DIAGNOSIS — M48.061 SPINAL STENOSIS OF LUMBAR REGION WITHOUT NEUROGENIC CLAUDICATION: Primary | ICD-10-CM

## 2021-02-08 RX ORDER — GABAPENTIN 100 MG/1
CAPSULE ORAL
Qty: 60 CAP | Refills: 1 | Status: SHIPPED | OUTPATIENT
Start: 2021-02-08 | End: 2021-05-26

## 2021-02-17 DIAGNOSIS — M62.838 MUSCLE SPASM: ICD-10-CM

## 2021-02-17 RX ORDER — CELECOXIB 200 MG/1
200 CAPSULE ORAL DAILY
Qty: 90 CAP | Refills: 2 | Status: SHIPPED | OUTPATIENT
Start: 2021-02-17 | End: 2021-11-03 | Stop reason: CLARIF

## 2021-03-10 ENCOUNTER — OFFICE VISIT (OUTPATIENT)
Dept: ORTHOPEDIC SURGERY | Age: 66
End: 2021-03-10
Payer: OTHER MISCELLANEOUS

## 2021-03-10 VITALS
HEIGHT: 70 IN | DIASTOLIC BLOOD PRESSURE: 62 MMHG | HEART RATE: 77 BPM | RESPIRATION RATE: 18 BRPM | TEMPERATURE: 97.3 F | SYSTOLIC BLOOD PRESSURE: 130 MMHG | BODY MASS INDEX: 33.79 KG/M2 | WEIGHT: 236 LBS | OXYGEN SATURATION: 98 %

## 2021-03-10 DIAGNOSIS — Z98.890 STATUS POST ARTHROSCOPY OF LEFT SHOULDER: ICD-10-CM

## 2021-03-10 DIAGNOSIS — S46.012A TRAUMATIC COMPLETE TEAR OF LEFT ROTATOR CUFF, INITIAL ENCOUNTER: Primary | ICD-10-CM

## 2021-03-10 DIAGNOSIS — S46.112A RUPTURE OF LEFT LONG HEAD BICEPS TENDON, INITIAL ENCOUNTER: ICD-10-CM

## 2021-03-10 PROCEDURE — G8427 DOCREV CUR MEDS BY ELIG CLIN: HCPCS | Performed by: ORTHOPAEDIC SURGERY

## 2021-03-10 PROCEDURE — G8536 NO DOC ELDER MAL SCRN: HCPCS | Performed by: ORTHOPAEDIC SURGERY

## 2021-03-10 PROCEDURE — 99213 OFFICE O/P EST LOW 20 MIN: CPT | Performed by: ORTHOPAEDIC SURGERY

## 2021-03-10 PROCEDURE — G8417 CALC BMI ABV UP PARAM F/U: HCPCS | Performed by: ORTHOPAEDIC SURGERY

## 2021-03-10 PROCEDURE — G8752 SYS BP LESS 140: HCPCS | Performed by: ORTHOPAEDIC SURGERY

## 2021-03-10 PROCEDURE — 1101F PT FALLS ASSESS-DOCD LE1/YR: CPT | Performed by: ORTHOPAEDIC SURGERY

## 2021-03-10 PROCEDURE — G8432 DEP SCR NOT DOC, RNG: HCPCS | Performed by: ORTHOPAEDIC SURGERY

## 2021-03-10 PROCEDURE — 3017F COLORECTAL CA SCREEN DOC REV: CPT | Performed by: ORTHOPAEDIC SURGERY

## 2021-03-10 PROCEDURE — G8754 DIAS BP LESS 90: HCPCS | Performed by: ORTHOPAEDIC SURGERY

## 2021-03-10 NOTE — PROGRESS NOTES
Kobe Roberts  1955     HISTORY OF PRESENT ILLNESS  Kobe Roberts is a 77 y.o. male who presents today for evaluation s/p Left shoulder arthroscopic rotator cuff repair, glenohumeral joint debridement, SLAP tear and biceps stump debridement on 12/7/20. Patient has been going to PT. Describes pain as a 2/10. Has been taking tylenol for pain. He has been compliant with his exercises and restrictions. He is a little soreness but is overall doing well and making progress in PT. Patient denies any fever, chills, chest pain, shortness of breath or calf pain. There are no new illness or injuries to report since last seen in the office. PHYSICAL EXAM:   Visit Vitals  /62 (BP 1 Location: Left upper arm, BP Patient Position: Sitting, BP Cuff Size: Large adult)   Pulse 77   Temp 97.3 °F (36.3 °C) (Temporal)   Resp 18   Ht 5' 10\" (1.778 m)   Wt 236 lb (107 kg)   SpO2 98%   BMI 33.86 kg/m²      The patient is a well-developed, well-nourished male in no acute distress. The patient is alert and oriented times three. The patient appears to be well groomed. Mood and affect are normal.  ORTHOPEDIC EXAM of left shoulder:  Inspection: swelling not present,  Bruising not present  Incision well healed  Passive glenohumeral abduction 0-90 degrees, 180 PFF and 180 AFF, 50 PER, IR lower lumbar  Stability: Stable  Strength: n/a  2+ distal pulses    IMPRESSION:  s/p Left shoulder arthroscopic rotator cuff repair, glenohumeral joint debridement, SLAP tear and biceps stump debridement    PLAN:   Pt doing well post operatively  He is having a little discomfort but overall doing well. His ROM looks great today. Will continue exercises and PT now. Another order was placed today. He can begin gentle strengthening. Stressed to patient that nothing causes an increase in pain. Pt not given a refill of pain medication today.    Pt given a note to go back to work sedentary duties only for the next visit in 6 weeks starting March 25 2021  RTC 6 weeks    [facility]ist

## 2021-03-10 NOTE — LETTER
NOTIFICATION RETURN TO WORK / SCHOOL 
 
3/10/2021 2:31 PM 
 
Mr. Lizzy Rodriguez [address] [facility] [id number] To Whom It May Concern: 
 
Lizzy Rodriguez is currently under the care of [address] [place] [address]. He will return to work with restrictions on March 22, 2021. Sedentary duties/desk duties only. No lifting pushing pulling with operative arm for the next 6 weeks. If there are questions or concerns please have the patient contact our office.  
 
 
 
Sincerely, 
 
 
CARA Vera

## 2021-04-26 NOTE — PROGRESS NOTES
[facility]  [address], [facility] [address]  Phone: [phone]  Fax: [phone]    Pt's YOB: 1955    ASSESSMENT   Diagnoses and all orders for this visit:    1. Displacement of lumbar intervertebral disc without myelopathy    2. Lumbago-sciatica due to displacement of lumbar intervertebral disc    3. Facet arthropathy, lumbar  -     methylPREDNISolone (MEDROL DOSEPACK) 4 mg tablet; Per dose pack instructions    4. Muscle spasm  -     metaxalone (Skelaxin) 800 mg tablet; Take 1 tab by mouth BID-TID prn muscle spasm    5. Neuropathy    6. Spinal stenosis of lumbar region without neurogenic claudication    7. Chronic anticoagulation         IMPRESSION AND PLAN:  Donn Fontanez is a 74 y.o. male with history of lumbar pain. He reports continued pain in the lower back with activity and when standing for prolonged periods of time. Pt notes that his lower back pain has not increased beyond a 3/10 since his last office visit. He takes Skelaxin 800 mg intermittently as needed. 1) Pt was given information on lumbar arthritis exercises. 2) He received a refill of Skelaxin 800 mg 1 tab BID-TID prn muscle spasm. 3) Pt is not a candidate for NSAID's due to anticoagulation with Coumadin. 4) He was prescribed a Medrol Dosepak--pt is aware that he is not to use this around the Covid vaccine. 5) I recommended that the patient lose weight. 6) Mr. Manuel Thorpe has a reminder for a \"due or due soon\" health maintenance. I have asked that he contact his primary care provider, Fozia Hu MD, for follow-up on this health maintenance. 7)  demonstrated consistency with prescribing. Follow-up and Dispositions    · Return in about 4 months (around 8/27/2021) for Medication follow up. HISTORY OF PRESENT ILLNESS:  Donn Fontanez is a 74 y.o. male with history of lumbar pain and presents to the office today for follow up.  He reports continued pain in the lower back with activity and when standing for prolonged periods of time. Pt notes that his lower back pain has not increased beyond a 3/10 since his last office visit. He reports relief when using a TheraCane during episodes of upper back pain. Pt notes that he has been out of work since 11/1/2020 and had left rotator cuff repair on 12/7/2020. He notes that he completed left shoulder physical therapy yesterday. Pt had right shoulder surgery in 2019 and notes that his shoulder is doing well at this time. He has considered return back to work part-time. Pt takes Skelaxin 800 mg intermittently as needed. He is currently on Coumadin. Pt reports relief when taking a Medrol Dosepak. He notes that he has started to walk regularly. Pt at this time desires to continue with current care.     Pain Scale: 1/10    PCP: Gila Knapp MD     Past Medical History:   Diagnosis Date    Arthritis     Bleeding     Chronic pain     knee and shoulder    GERD (gastroesophageal reflux disease)     Headache(784.0)     migraine    High cholesterol     Hypertension     Lower back pain 11/6/2010    Other chest pain     Personal history of prostate cancer     Pure hypercholesterolemia     Right buttock pain 11/6/2010    Right foot pain     Rotator cuff tear     left-since 2010, worsened tear Jan.    Rotator cuff tear, right     Spinal stenosis     Tendonitis, tibialis     anterior    Thromboembolus ([facility] [address]     3 after sx last one 2000        Social History     Socioeconomic History    Marital status:      Spouse name: Not on file    Number of children: Not on file    Years of education: Not on file    Highest education level: Not on file   Occupational History    Not on file   Social Needs    Financial resource strain: Not on file    Food insecurity     Worry: Not on file     Inability: Not on file    Transportation needs     Medical: Not on file     Non-medical: Not on file   Tobacco Use    Smoking status: Never Smoker    Smokeless tobacco: Never Used   Substance and Sexual Activity    Alcohol use: No    Drug use: No    Sexual activity: Not Currently   Lifestyle    Physical activity     Days per week: Not on file     Minutes per session: Not on file    Stress: Not on file   Relationships    Social connections     Talks on phone: Not on file     Gets together: Not on file     Attends Islam service: Not on file     Active member of club or organization: Not on file     Attends meetings of clubs or organizations: Not on file     Relationship status: Not on file    Intimate partner violence     Fear of current or ex partner: Not on file     Emotionally abused: Not on file     Physically abused: Not on file     Forced sexual activity: Not on file   Other Topics Concern     Service Not Asked    Blood Transfusions Not Asked    Caffeine Concern Not Asked    Occupational Exposure Not Asked    Hobby Hazards Not Asked    Sleep Concern Not Asked    Stress Concern Not Asked    Weight Concern Not Asked    Special Diet Not Asked    Back Care Not Asked    Exercise Not Asked    Bike Helmet Not Asked   [address] [facility] [facility] Not Asked    Self-Exams Not Asked   Social History Narrative    Not on file       Current Outpatient Medications   Medication Sig Dispense Refill    metaxalone (Skelaxin) 800 mg tablet Take 1 tab by mouth BID-TID prn muscle spasm 60 Tab 1    methylPREDNISolone (MEDROL DOSEPACK) 4 mg tablet Per dose pack instructions 1 Dose Pack 0    clindamycin (CLEOCIN) 300 mg capsule Take 2 capsules po one hour prior to appt 2 Cap 3    diclofenac (VOLTAREN) 1 % gel Apply 4 g to affected area four (4) times daily. 500 g 3    celecoxib (CELEBREX) 200 mg capsule Take 1 Cap by mouth daily.  90 Cap 2    gabapentin (NEURONTIN) 100 mg capsule TAKE 1 CAPSULE BY MOUTH AT NIGHT FOR 1 WEEK, THEN INCREASE TO 2 CAPSULES AS DIRECTED 60 Cap 1    L gasseri/B bifidum/B longum (MCALLISTER' COLON HEALTH PO) Take  by mouth nightly.  lansoprazole (PREVACID) 30 mg capsule Take 30 mg by mouth daily.  warfarin (COUMADIN) 5 mg tablet TAKE 2 AND 1/2 TABLETS BY MOUTH DAILY OR AS DIRECTED (Patient taking differently: Take  by mouth daily. TAKE 2 AND 1/2 TABLETS BY MOUTH TUES, THURS, SAT or as directed) 75 Tab 0    lisinopril-hydroCHLOROthiazide (PRINZIDE, ZESTORETIC) 20-12.5 mg per tablet TAKE 1 TABLET BY MOUTH DAILY (Patient taking differently: Take 1 Tab by mouth daily. TAKE 1 TABLET BY MOUTH DAILY) 90 Tab 1    butalbital-acetaminophen-caff (FIORICET) -40 mg per capsule 2 cap three times per day as needed for headache (Patient taking differently: Take  by mouth daily. 2 cap three times per day as needed for headache) 180 Cap 1    ALPRAZolam (XANAX) 0.5 mg tablet Take one half(1/2) tab to one(1) tab by mouth at bedtime as needed for sleep (Patient taking differently: Take  by mouth nightly as needed. Take one half(1/2) tab to one(1) tab by mouth at bedtime as needed for sleep) 30 Tab 0    montelukast (SINGULAIR) 10 mg tablet TAKE 1 TABLET BY MOUTH EVERY DAY (Patient taking differently: Take 10 mg by mouth nightly.) 90 Tab 0    acetaminophen (TYLENOL) 500 mg tablet Take 1,000 mg by mouth every six (6) hours as needed for Pain.  labetalol (NORMODYNE) 100 mg tablet TAKE 1 TABLET BY MOUTH TWICE DAILY. STOP VERAPAMIL (Patient taking differently: Take  by mouth two (2) times a day.) 180 Tab 0       Allergies   Allergen Reactions    Amoxicillin Itching    Augmentin [Amoxicillin-Pot Clavulanate] Itching    Chlorhexidine Towelette Itching    Hibiclens [Chlorhexidine Gluconate] Itching    Milk Containing Products Diarrhea    Nsaids (Non-Steroidal Anti-Inflammatory Drug) Other (comments)     On blood thinner, contraindicated.  Penicillins Rash    Requip [Ropinirole] Nausea and Vomiting         REVIEW OF SYSTEMS    Constitutional: Negative for fever, chills, or weight change.    Respiratory: Negative for cough or shortness of breath. Cardiovascular: Negative for chest pain or palpitations. Gastrointestinal: Negative for acid reflux, change in bowel habits, or constipation. Genitourinary: Negative for dysuria and flank pain. Musculoskeletal: Positive for lumbar pain. Neurological: Negative for headaches, dizziness, or numbness. Endo/Heme/Allergies: Negative for increased bruising. Psychiatric/Behavioral: Positive for difficulty with sleep. As per HPI    PHYSICAL EXAMINATION  Visit Vitals  /79 (BP 1 Location: Right arm, BP Patient Position: Sitting)   Pulse 68   Temp 97.7 °F (36.5 °C) (Oral)   Resp 18   Ht 5' 10\" (1.778 m)   Wt 238 lb 3.2 oz (108 kg)   SpO2 95%   BMI 34.18 kg/m²       Constitutional: Awake, alert, and in no acute distress. Neurological: 1+ symmetrical DTRs in the upper extremities. 1+ symmetrical DTRs in the lower extremities. Sensation to light touch is intact. Negative Lepe's sign bilaterally. Skin: warm, dry, and intact. Musculoskeletal: Good range of motion in both shoulders. Tenderness to palpation in the lower lumbar region. Moderate pain with extension and axial loading. No pain with internal or external rotation of his hips. Negative straight leg raise bilaterally. Biceps  Triceps Deltoids Wrist Ext Wrist Flex Hand Intrin   Right +4/5 +4/5 +4/5 +4/5 +4/5 +4/5   Left +4/5 +4/5 +4/5 +4/5 +4/5 +4/5      Hip Flex  Quads Hamstrings Ankle DF EHL Ankle PF   Right +4/5 +4/5 +4/5 +4/5 +4/5 +4/5   Left +4/5 +4/5 +4/5 +4/5 +4/5 +4/5     IMAGING:    Lumbar spine MRI from 01/24/2018 was personally reviewed with the patient and demonstrated:  Results from [facility] on 01/24/18   MRI LUMB SPINE W WO CONT     Narrative MR lumbar spine with and without contrast    CPT CODE: 31871    HISTORY: Chronic and worsening low back pain with left lower extremity pain. Increasing weakness. Remote history of surgeries. No recent injury.     COMPARISON: MRI January 2013.    TECHNIQUE: Lumbar spine scanned with axial and sagittal T1W scans, axial and  sagittal T2W scans, and with post gadolinium axial and sagittal T1W scans. Contrast used: 10 cc Gadavist.    FINDINGS:     Prior laminotomies on the left at L4-5 and bilaterally at L5-S1. No fracture,  bone destruction, or fluid collection. Intact lordosis. Normal vertebral body heights. Small less than 5 mm low signal  focus within anterior L4, developed since 2013. No similar focus elsewhere. No  aggressive features. Otherwise generally fatty marrow signal with some chronic  fatty endplate changes straddling L5-S1. Moderate to severe disc space narrowing  and disc desiccation of that level. Mild to moderate narrowing and desiccation  of L3-4 and L4-5. Conus at T12-L1. Axial imaging correlation:    T12-L1:  Patent canal and foramina. L1-L2: Patent canal and foramina. L2-L3: Patent canal and foramina. L3-L4: Broad-based disc osteophyte complex. Bilateral facet arthropathy with  ligamentum flavum thickening and buckling. Moderate concentric spinal stenosis. Particular narrowing of lateral recesses with transient distortion of the  crossing L4 nerves. Axial T2 image 20. Patent foramina.  Progression of  degenerative findings. L4-L5: Postoperative level. Mild broad-based disc osteophyte complex with a  small amount of enhancing scar tissue. Hypertrophic facet arthropathy. Moderate  spinal stenosis. Narrowing of the lateral recesses left more than right. Transient distortion of the crossing nerves particularly left L5. Axial T2 image  13. Mild foraminal stenoses.  Unchanged. L5-S1: Postoperative level. Broad-based disc osteophyte complex with enhancing  scar tissue centrally. Hypertrophic facet arthropathy. No significant spinal  stenosis. Moderately severe bilateral foraminal stenoses. Unchanged. Incidental imaging of regional soft tissues unremarkable.                  Impression IMPRESSION:    1.  Some progression of degenerative disc and facet disease at L3-4, with  moderate spinal stenosis and potentially impingement of the crossing L4 nerves,  left more than right. 2. Stable postsurgical and degenerative findings at L4-5 and L5-S1.                   Cervical Spine MRI from 12/19/2016 demonstrated:  Results from Hospital Encounter on 12/19/16   MRI CERV SPINE WO CONT     Narrative MRI of cervical spine without contrast    HISTORY: Chronic progressive neck pain with headaches. COMPARISON: No prior MRI. Cervical spine radiographs from 12/1/2016. TECHNIQUE: T1 weighted, T2 FSE, FSE inversion recovery sagittal images are  supplemented by T2 weighted and GRE/medic axial images. FINDINGS: Normal alignment and vertebral body heights. Normal marrow signal  except for mild endplate degenerative change. Cervical cord is normal in signal  and caliber. Visualized posterior fossa contents look normal. Paraspinal soft  tissues look normal.    C2-3: No significant degenerative disc disease or spinal stenosis. C3-4: Small nonfocal disc protrusion which contacts the ventral cord and causes  borderline spinal stenosis. No foraminal stenosis. C4-5: No significant degenerative disc disease. Mildly hypertrophic facets. No  spinal stenosis. C5-6: Disc is narrowed with diffusely bulging disc and osteophyte complex. Facets are mildly hypertrophic. Mild spinal canal and moderate left foraminal  stenoses. C6-7: Disc is narrowed with diffusely bulging disc and osteophyte complex. Facets are mildly hypertrophic. Mild spinal canal and moderate bilateral  foraminal stenoses. C7-T1: No significant degenerative disc disease or spinal stenosis.               Impression Impression:    Disc osteophyte complexes causing mild spinal canal stenoses at C5-6 and C6-7.     Moderate left foraminal stenosis at C5-6 and moderate bilateral foraminal  stenoses at C6-7.          Written by Jorge L Armstrong, as dictated by Felipa Roger MD.  I, Dr. Felipa Roger confirm that all documentation is accurate.

## 2021-04-27 ENCOUNTER — OFFICE VISIT (OUTPATIENT)
Dept: ORTHOPEDIC SURGERY | Age: 66
End: 2021-04-27
Payer: OTHER MISCELLANEOUS

## 2021-04-27 VITALS
WEIGHT: 238.2 LBS | RESPIRATION RATE: 18 BRPM | HEART RATE: 68 BPM | DIASTOLIC BLOOD PRESSURE: 79 MMHG | TEMPERATURE: 97.7 F | HEIGHT: 70 IN | SYSTOLIC BLOOD PRESSURE: 126 MMHG | BODY MASS INDEX: 34.1 KG/M2 | OXYGEN SATURATION: 95 %

## 2021-04-27 DIAGNOSIS — M62.838 MUSCLE SPASM: ICD-10-CM

## 2021-04-27 DIAGNOSIS — M51.26 DISPLACEMENT OF LUMBAR INTERVERTEBRAL DISC WITHOUT MYELOPATHY: Primary | ICD-10-CM

## 2021-04-27 DIAGNOSIS — G62.9 NEUROPATHY: ICD-10-CM

## 2021-04-27 DIAGNOSIS — Z79.01 CHRONIC ANTICOAGULATION: ICD-10-CM

## 2021-04-27 DIAGNOSIS — M48.061 SPINAL STENOSIS OF LUMBAR REGION WITHOUT NEUROGENIC CLAUDICATION: ICD-10-CM

## 2021-04-27 DIAGNOSIS — M47.816 FACET ARTHROPATHY, LUMBAR: ICD-10-CM

## 2021-04-27 DIAGNOSIS — M51.27 LUMBAGO-SCIATICA DUE TO DISPLACEMENT OF LUMBAR INTERVERTEBRAL DISC: ICD-10-CM

## 2021-04-27 PROCEDURE — 99214 OFFICE O/P EST MOD 30 MIN: CPT | Performed by: PHYSICAL MEDICINE & REHABILITATION

## 2021-04-27 RX ORDER — METAXALONE 800 MG/1
TABLET ORAL
Qty: 60 TAB | Refills: 1 | Status: SHIPPED | OUTPATIENT
Start: 2021-04-27 | End: 2021-08-24

## 2021-04-27 RX ORDER — METHYLPREDNISOLONE 4 MG/1
TABLET ORAL
Qty: 1 DOSE PACK | Refills: 0 | Status: SHIPPED | OUTPATIENT
Start: 2021-04-27 | End: 2022-01-07

## 2021-04-27 NOTE — PATIENT INSTRUCTIONS
Low Back Arthritis: Exercises  Introduction  Here are some examples of typical rehabilitation exercises for your condition. Start each exercise slowly. Ease off the exercise if you start to have pain. Your doctor or physical therapist will tell you when you can start these exercises and which ones will work best for you. When you are not being active, find a comfortable position for rest. Some people are comfortable on the floor or a medium-firm bed with a small pillow under their head and another under their knees. Some people prefer to lie on their side with a pillow between their knees. Don't stay in one position for too long. Take short walks (10 to 20 minutes) every 2 to 3 hours. Avoid slopes, hills, and stairs until you feel better. Walk only distances you can manage without pain, especially leg pain. How to do the exercises  Pelvic tilt   1. Lie on your back with your knees bent. 2. \"Brace\" your stomach--tighten your muscles by pulling in and imagining your belly button moving toward your spine. 3. Press your lower back into the floor. You should feel your hips and pelvis rock back. 4. Hold for 6 seconds while breathing smoothly. 5. Relax and allow your pelvis and hips to rock forward. 6. Repeat 8 to 12 times. Back stretches   1. Get down on your hands and knees on the floor. 2. Relax your head and allow it to droop. Round your back up toward the ceiling until you feel a nice stretch in your upper, middle, and lower back. Hold this stretch for as long as it feels comfortable, or about 15 to 30 seconds. 3. Return to the starting position with a flat back while you are on your hands and knees. 4. Let your back sway by pressing your stomach toward the floor. Lift your buttocks toward the ceiling. 5. Hold this position for 15 to 30 seconds. 6. Repeat 2 to 4 times. Follow-up care is a key part of your treatment and safety.  Be sure to make and go to all appointments, and call your doctor if you are having problems. It's also a good idea to know your test results and keep a list of the medicines you take. Where can you learn more? Go to http://www.Exabre.com/  Enter T094 in the search box to learn more about \"Low Back Arthritis: Exercises. \"  Current as of: November 16, 2020               Content Version: 12.8  © 2006-2021 [employer]. Care instructions adapted under license by [employer] (which disclaims liability or warranty for this information). If you have questions about a medical condition or this instruction, always ask your healthcare professional. [place] [address] any warranty or liability for your use of this information. Starting a Weight Loss Plan: Care Instructions  Overview     If you're thinking about losing weight, it can be hard to know where to start. Your doctor can help you set up a weight loss plan that best meets your needs. You may want to take a class on nutrition or exercise, or you could join a weight loss support group. If you have questions about how to make changes to your eating or exercise habits, ask your doctor about seeing a registered dietitian or an exercise specialist.  It can be a big challenge to lose weight. But you don't have to make huge changes at once. Make small changes, and stick with them. When those changes become habit, add a few more changes. If you don't think you're ready to make changes right now, try to pick a date in the future. Make an appointment to see your doctor to discuss whether the time is right for you to start a plan. Follow-up care is a key part of your treatment and safety. Be sure to make and go to all appointments, and call your doctor if you are having problems. It's also a good idea to know your test results and keep a list of the medicines you take. How can you care for yourself at home? · Set realistic goals.  Many people expect to lose much more weight than is likely. A weight loss of 5% to 10% of your body weight may be enough to improve your health. · Get family and friends involved to provide support. Talk to them about why you are trying to lose weight, and ask them to help. They can help by participating in exercise and having meals with you, even if they may be eating something different. · Find what works best for you. If you do not have time or do not like to cook, a program that offers meal replacement bars or shakes may be better for you. Or if you like to prepare meals, finding a plan that includes daily menus and recipes may be best.  · Ask your doctor about other health professionals who can help you achieve your weight loss goals. ? A dietitian can help you make healthy changes in your diet. ? An exercise specialist or  can help you develop a safe and effective exercise program.  ? A counselor or psychiatrist can help you cope with issues such as depression, anxiety, or family problems that can make it hard to focus on weight loss. · Consider joining a support group for people who are trying to lose weight. Your doctor can suggest groups in your area. Where can you learn more? Go to http://www.gray.com/  Enter U357 in the search box to learn more about \"Starting a Weight Loss Plan: Care Instructions. \"  Current as of: September 23, 2020               Content Version: 12.8  © 4724-2884 [employer]. Care instructions adapted under license by [employer] (which disclaims liability or warranty for this information). If you have questions about a medical condition or this instruction, always ask your healthcare professional. [place] [address] any warranty or liability for your use of this information.

## 2021-04-27 NOTE — LETTER
4/27/2021 Patient: Buster Noriega YOB: 1955 Date of Visit: 4/27/2021 Sally Guan MD 
[address] [address] Via Fax: [phone] Dear Sally Guan MD, Thank you for referring Mr. Buster Noriega to [facility] for evaluation. My notes for this consultation are attached. If you have questions, please do not hesitate to call me. I look forward to following your patient along with you. Sincerely, Des Arango MD

## 2021-04-29 NOTE — PROGRESS NOTES
Cherylene Otter  1955     HISTORY OF PRESENT ILLNESS  Cherylene Otter is a 74 y.o. male who presents today for evaluation s/p Left shoulder arthroscopic rotator cuff repair, glenohumeral joint debridement, SLAP tear and biceps stump debridement on 12/7/20. Patient has been going to PT. Describes pain as a 2/10. Has been taking tylenol for pain. He has been compliant with his exercises and restrictions. He is a little soreness but is overall doing well and making progress in PT. He does not feel like he can do all of his heavy lifting duties at work yet. Patient denies any fever, chills, chest pain, shortness of breath or calf pain. There are no new illness or injuries to report since last seen in the office. PHYSICAL EXAM:   Visit Vitals  Pulse 79   Temp 98.2 °F (36.8 °C) (Temporal)   Ht 5' 10\" (1.778 m)   Wt 239 lb (108.4 kg)   SpO2 97%   BMI 34.29 kg/m²      The patient is a well-developed, well-nourished male in no acute distress. The patient is alert and oriented times three. The patient appears to be well groomed. Mood and affect are normal.  ORTHOPEDIC EXAM of left shoulder:  Inspection: swelling not present,  Bruising not present  Incision well healed  Passive glenohumeral abduction 0-90 degrees, 180 PFF and 180 AFF, 50 PER, IR lower lumbar  Stability: Stable  Strength: gentle rotator cuff testing with some weakness but no pain. 2+ distal pulses    IMPRESSION:  s/p Left shoulder arthroscopic rotator cuff repair, glenohumeral joint debridement, SLAP tear and biceps stump debridement    PLAN:   Pt doing well post operatively  He is having a little discomfort but overall doing well. His ROM looks great today. Will continue exercises and PT now. Another order was placed today. He can begin gentle strengthening. Stressed to patient that nothing causes an increase in pain. Pt not given a refill of pain medication today.    Pt given a note to go back to work May 18th 2021 part time 2-3 days a week with restrictions. No lifting more than 5-15 lbs with operative arm for the next 2 months. He will be reevaluated during that time.   RTC 2 months     ESPERANZA [facility] and Sp[facility] Specialist

## 2021-04-30 ENCOUNTER — OFFICE VISIT (OUTPATIENT)
Dept: ORTHOPEDIC SURGERY | Age: 66
End: 2021-04-30
Payer: OTHER MISCELLANEOUS

## 2021-04-30 VITALS
WEIGHT: 239 LBS | HEIGHT: 70 IN | TEMPERATURE: 98.2 F | BODY MASS INDEX: 34.22 KG/M2 | OXYGEN SATURATION: 97 % | HEART RATE: 79 BPM

## 2021-04-30 DIAGNOSIS — Z98.890 STATUS POST ARTHROSCOPY OF LEFT SHOULDER: ICD-10-CM

## 2021-04-30 DIAGNOSIS — S46.012A TRAUMATIC COMPLETE TEAR OF LEFT ROTATOR CUFF, INITIAL ENCOUNTER: Primary | ICD-10-CM

## 2021-04-30 DIAGNOSIS — S46.112A RUPTURE OF LEFT LONG HEAD BICEPS TENDON, INITIAL ENCOUNTER: ICD-10-CM

## 2021-04-30 PROCEDURE — 99213 OFFICE O/P EST LOW 20 MIN: CPT | Performed by: ORTHOPAEDIC SURGERY

## 2021-04-30 NOTE — LETTER
NOTIFICATION RETURN TO WORK / SCHOOL 
 
4/30/2021 4:01 PM 
 
Mr. Elijah Nguyen [address] [facility] [id number] To Whom It May Concern: 
 
Elijah Nguyen is currently under the care of [address] [place] [address]. He will return to work May 18th 2021 part time 2-3 days a week with restrictions. No lifting more than 5-15 lbs with operative arm for the next 2 months. Will be reevaluated during that time. If there are questions or concerns please have the patient contact our office.  
 
 
 
Sincerely, 
 
 
ACRA Vicente

## 2021-05-19 ENCOUNTER — TELEPHONE (OUTPATIENT)
Dept: ORTHOPEDIC SURGERY | Age: 66
End: 2021-05-19

## 2021-05-19 NOTE — TELEPHONE ENCOUNTER
Spoke with patient, he verbalized understanding that he may discontinue Celebrex when ready. Patient has completed his formal physical therapy.

## 2021-05-19 NOTE — TELEPHONE ENCOUNTER
He can just stop the celebrex at this point. He does not need to taper this medication. PT ending this week is fine.

## 2021-05-19 NOTE — TELEPHONE ENCOUNTER
Patient called stating he has been having bruising and bleeding on his left arm. He stated he saw his PCP, and they advised him to stop taking Celebrex. He is asking if he can just stop taking it, or if he needs instructions for tapering off. Patient also stated he had his final physical therapy visit this past Monday, and he does not feel he needs more physical therapy. Please advise patient at [phone].

## 2021-05-26 DIAGNOSIS — M47.816 FACET ARTHROPATHY, LUMBAR: ICD-10-CM

## 2021-05-26 DIAGNOSIS — G62.9 NEUROPATHY: ICD-10-CM

## 2021-05-26 DIAGNOSIS — M48.061 SPINAL STENOSIS OF LUMBAR REGION WITHOUT NEUROGENIC CLAUDICATION: ICD-10-CM

## 2021-05-26 RX ORDER — GABAPENTIN 100 MG/1
CAPSULE ORAL
Qty: 60 CAPSULE | Refills: 2 | Status: SHIPPED | OUTPATIENT
Start: 2021-05-26 | End: 2021-09-01 | Stop reason: SDUPTHER

## 2021-06-02 ENCOUNTER — OFFICE VISIT (OUTPATIENT)
Dept: ORTHOPEDIC SURGERY | Age: 66
End: 2021-06-02
Payer: MEDICARE

## 2021-06-02 ENCOUNTER — HOSPITAL ENCOUNTER (OUTPATIENT)
Dept: LAB | Age: 66
Discharge: HOME | End: 2021-06-02
Payer: MEDICARE

## 2021-06-02 VITALS
TEMPERATURE: 97.3 F | WEIGHT: 237 LBS | HEART RATE: 82 BPM | BODY MASS INDEX: 33.93 KG/M2 | HEIGHT: 70 IN | OXYGEN SATURATION: 99 %

## 2021-06-02 DIAGNOSIS — Z96.652 HISTORY OF TOTAL LEFT KNEE REPLACEMENT (TKR): ICD-10-CM

## 2021-06-02 DIAGNOSIS — M65.9 SYNOVITIS OF LEFT KNEE: ICD-10-CM

## 2021-06-02 DIAGNOSIS — Z96.652 HISTORY OF TOTAL LEFT KNEE REPLACEMENT (TKR): Primary | ICD-10-CM

## 2021-06-02 DIAGNOSIS — M25.562 LEFT KNEE PAIN, UNSPECIFIED CHRONICITY: ICD-10-CM

## 2021-06-02 LAB
BASOPHILS # BLD: 0.1 K/UL (ref 0–0.1)
BASOPHILS NFR BLD: 1 % (ref 0–2)
CRP SERPL-MCNC: 0.9 MG/DL (ref 0–0.3)
DIFFERENTIAL METHOD BLD: ABNORMAL
EOSINOPHIL # BLD: 0.2 K/UL (ref 0–0.4)
EOSINOPHIL NFR BLD: 3 % (ref 0–5)
ERYTHROCYTE [DISTWIDTH] IN BLOOD BY AUTOMATED COUNT: 13.4 % (ref 11.6–14.5)
ERYTHROCYTE [SEDIMENTATION RATE] IN BLOOD: 14 MM/HR (ref 0–20)
HCT VFR BLD AUTO: 38.2 % (ref 36–48)
HGB BLD-MCNC: 13 G/DL (ref 13–16)
LYMPHOCYTES # BLD: 1.3 K/UL (ref 0.9–3.6)
LYMPHOCYTES NFR BLD: 21 % (ref 21–52)
MCH RBC QN AUTO: 32.6 PG (ref 24–34)
MCHC RBC AUTO-ENTMCNC: 34 G/DL (ref 31–37)
MCV RBC AUTO: 95.7 FL (ref 74–97)
MONOCYTES # BLD: 0.6 K/UL (ref 0.05–1.2)
MONOCYTES NFR BLD: 11 % (ref 3–10)
NEUTS SEG # BLD: 3.8 K/UL (ref 1.8–8)
NEUTS SEG NFR BLD: 64 % (ref 40–73)
PLATELET # BLD AUTO: 184 K/UL (ref 135–420)
PMV BLD AUTO: 11.3 FL (ref 9.2–11.8)
RBC # BLD AUTO: 3.99 M/UL (ref 4.35–5.65)
URATE SERPL-MCNC: 7.1 MG/DL (ref 2.6–7.2)
WBC # BLD AUTO: 5.9 K/UL (ref 4.6–13.2)

## 2021-06-02 PROCEDURE — 99214 OFFICE O/P EST MOD 30 MIN: CPT | Performed by: PHYSICIAN ASSISTANT

## 2021-06-02 PROCEDURE — G8417 CALC BMI ABV UP PARAM F/U: HCPCS | Performed by: PHYSICIAN ASSISTANT

## 2021-06-02 PROCEDURE — 1101F PT FALLS ASSESS-DOCD LE1/YR: CPT | Performed by: PHYSICIAN ASSISTANT

## 2021-06-02 PROCEDURE — G8427 DOCREV CUR MEDS BY ELIG CLIN: HCPCS | Performed by: PHYSICIAN ASSISTANT

## 2021-06-02 PROCEDURE — 85025 COMPLETE CBC W/AUTO DIFF WBC: CPT

## 2021-06-02 PROCEDURE — G8536 NO DOC ELDER MAL SCRN: HCPCS | Performed by: PHYSICIAN ASSISTANT

## 2021-06-02 PROCEDURE — 3017F COLORECTAL CA SCREEN DOC REV: CPT | Performed by: PHYSICIAN ASSISTANT

## 2021-06-02 PROCEDURE — 36415 COLL VENOUS BLD VENIPUNCTURE: CPT

## 2021-06-02 PROCEDURE — 85652 RBC SED RATE AUTOMATED: CPT

## 2021-06-02 PROCEDURE — 86140 C-REACTIVE PROTEIN: CPT

## 2021-06-02 PROCEDURE — 83520 IMMUNOASSAY QUANT NOS NONAB: CPT

## 2021-06-02 PROCEDURE — 84550 ASSAY OF BLOOD/URIC ACID: CPT

## 2021-06-02 PROCEDURE — G8756 NO BP MEASURE DOC: HCPCS | Performed by: PHYSICIAN ASSISTANT

## 2021-06-02 PROCEDURE — 73562 X-RAY EXAM OF KNEE 3: CPT | Performed by: PHYSICIAN ASSISTANT

## 2021-06-02 PROCEDURE — G8432 DEP SCR NOT DOC, RNG: HCPCS | Performed by: PHYSICIAN ASSISTANT

## 2021-06-02 NOTE — PROGRESS NOTES
[address] [facility], [address] [facility]  [phone]           Patient: Donn Fontanez                MRN: [id number]       SSN: [id number]  YOB: 1955        AGE: 74 y.o. SEX: male  Body mass index is 34.01 kg/m². PCP: Fozia Hu MD  06/02/21      This office note has been dictated. REVIEW OF SYSTEMS:  Constitutional: Negative for fever, chills, weight loss and malaise/fatigue. HENT: Negative. Eyes: Negative. Respiratory: Negative. Cardiovascular: Negative. Gastrointestinal: No bowel incontinence or constipation. Genitourinary: No bladder incontinence or saddle anesthesia. Skin: Negative. Neurological: Negative. Endo/Heme/Allergies: Negative. Psychiatric/Behavioral: Negative. Musculoskeletal: As per HPI above. Past Medical History:   Diagnosis Date    Arthritis     Bleeding     Chronic pain     knee and shoulder    GERD (gastroesophageal reflux disease)     Headache(784.0)     migraine    High cholesterol     Hypertension     Lower back pain 11/6/2010    Other chest pain     Personal history of prostate cancer     Pure hypercholesterolemia     Right buttock pain 11/6/2010    Right foot pain     Rotator cuff tear     left-since 2010, worsened tear Jan.    Rotator cuff tear, right     Spinal stenosis     Tendonitis, tibialis     anterior    Thromboembolus (HCC)     3 after sx last one 2000         Current Outpatient Medications:     gabapentin (NEURONTIN) 100 mg capsule, TAKE 1 CAPSULE BY MOUTH NIGHTLY FOR 1 WEEK, THEN INCREASE TO 2 CAPSULES NIGHTLY, Disp: 60 Capsule, Rfl: 2    metaxalone (Skelaxin) 800 mg tablet, Take 1 tab by mouth BID-TID prn muscle spasm, Disp: 60 Tab, Rfl: 1    diclofenac (VOLTAREN) 1 % gel, Apply 4 g to affected area four (4) times daily. , Disp: 500 g, Rfl: 3    L gasseri/B bifidum/B longum (MCALLISTER' [address]), Take  by mouth nightly. , Disp: , Rfl:     lansoprazole (PREVACID) 30 mg capsule, Take 30 mg by mouth daily. , Disp: , Rfl:     warfarin (COUMADIN) 5 mg tablet, TAKE 2 AND 1/2 TABLETS BY MOUTH DAILY OR AS DIRECTED (Patient taking differently: Take  by mouth daily. TAKE 2 AND 1/2 TABLETS BY MOUTH TUES, THURS, SAT or as directed), Disp: 75 Tab, Rfl: 0    lisinopril-hydroCHLOROthiazide (PRINZIDE, ZESTORETIC) 20-12.5 mg per tablet, TAKE 1 TABLET BY MOUTH DAILY (Patient taking differently: Take 1 Tab by mouth daily. TAKE 1 TABLET BY MOUTH DAILY), Disp: 90 Tab, Rfl: 1    labetalol (NORMODYNE) 100 mg tablet, TAKE 1 TABLET BY MOUTH TWICE DAILY. STOP VERAPAMIL (Patient taking differently: Take  by mouth two (2) times a day.), Disp: 180 Tab, Rfl: 0    butalbital-acetaminophen-caff (FIORICET) -40 mg per capsule, 2 cap three times per day as needed for headache (Patient taking differently: Take  by mouth daily. 2 cap three times per day as needed for headache), Disp: 180 Cap, Rfl: 1    ALPRAZolam (XANAX) 0.5 mg tablet, Take one half(1/2) tab to one(1) tab by mouth at bedtime as needed for sleep (Patient taking differently: Take  by mouth nightly as needed. Take one half(1/2) tab to one(1) tab by mouth at bedtime as needed for sleep), Disp: 30 Tab, Rfl: 0    montelukast (SINGULAIR) 10 mg tablet, TAKE 1 TABLET BY MOUTH EVERY DAY (Patient taking differently: Take 10 mg by mouth nightly.), Disp: 90 Tab, Rfl: 0    acetaminophen (TYLENOL) 500 mg tablet, Take 1,000 mg by mouth every six (6) hours as needed for Pain., Disp: , Rfl:     methylPREDNISolone (MEDROL DOSEPACK) 4 mg tablet, Per dose pack instructions (Patient not taking: Reported on 6/2/2021), Disp: 1 Dose Pack, Rfl: 0    clindamycin (CLEOCIN) 300 mg capsule, Take 2 capsules po one hour prior to appt (Patient not taking: Reported on 6/2/2021), Disp: 2 Cap, Rfl: 3    celecoxib (CELEBREX) 200 mg capsule, Take 1 Cap by mouth daily.  (Patient not taking: Reported on 6/2/2021), Disp: 90 Cap, Rfl: 2    Allergies   Allergen Reactions    Amoxicillin Itching    Augmentin [Amoxicillin-Pot Clavulanate] Itching    Chlorhexidine Towelette Itching    Hibiclens [Chlorhexidine Gluconate] Itching    Milk Containing Products Diarrhea    Nsaids (Non-Steroidal Anti-Inflammatory Drug) Other (comments)     On blood thinner, contraindicated.  Penicillins Rash    Requip [Ropinirole] Nausea and Vomiting       Social History     Socioeconomic History    Marital status:      Spouse name: Not on file    Number of children: Not on file    Years of education: Not on file    Highest education level: Not on file   Occupational History    Not on file   Tobacco Use    Smoking status: Never Smoker    Smokeless tobacco: Never Used   Substance and Sexual Activity    Alcohol use: No    Drug use: No    Sexual activity: Not Currently   Other Topics Concern     Service Not Asked    Blood Transfusions Not Asked    Caffeine Concern Not Asked    Occupational Exposure Not Asked    Hobby Hazards Not Asked    Sleep Concern Not Asked    Stress Concern Not Asked    Weight Concern Not Asked    Special Diet Not Asked    Back Care Not Asked    Exercise Not Asked    Bike Helmet Not Asked   [address] Not Asked    Self-Exams Not Asked   Social History Narrative    Not on file     Social Determinants of Health     Financial Resource Strain:     Difficulty of Paying Living Expenses:    Food Insecurity:     Worried About Running Out of Food in the Last Year:     Ran Out of Food in the Last Year:    Transportation Needs:     Lack of Transportation (Medical):      Lack of Transportation (Non-Medical):    Physical Activity:     Days of Exercise per Week:     Minutes of Exercise per Session:    Stress:     Feeling of Stress :    Social Connections:     Frequency of Communication with Friends and Family:     Frequency of Social Gatherings with Friends and Family:     Attends [employer] Services:     Active Member of Clubs or Organizations:     Attends Club or Organization Meetings:     Marital Status:    Intimate Partner Violence:     Fear of Current or Ex-Partner:     Emotionally Abused:     Physically Abused:     Sexually Abused:        Past Surgical History:   Procedure Laterality Date    FOOT/TOES SURGERY PROC UNLISTED      HX BACK SURGERY      HX HEENT Right 09/2018    eye surgery, macular     HX KNEE REPLACEMENT Left     HX ORTHOPAEDIC  06-25-12    Right foot with excision of bursa and adipose tissue from fifth metatarsal base by Dr. Alysia Ramirez      lower back (1992 and 2000)    HX OTHER SURGICAL      left foot (2008)    HX OTHER SURGICAL      Retina repair     HX PROSTATECTOMY  11/2018    HX ROTATOR CUFF REPAIR Right 01/28/2019    by Dr. Tran Comment LAMINOTOMY             Patient seen and evaluated today for his left total knee replacement. He is now approximately 3 years status post surgery. Overall he is done well with that. Over the past 6 months or so it has some increasing discomfort of his knee without injury. Is had some episodes where his knee wants to give way on him. He denies any fevers or chills. He denies any specific start up pain however stiffness after being seated. Patient denies recent fevers, chills, chest pain, SOB, or injuries. No recent systemic changes noted. A 12-point review of systems is performed today. Pertinent positives are noted. All other systems reviewed and otherwise are negative. Physical exam: General: Alert and oriented x3, nad.  well-developed, well nourished. normal affect, AF. NC/AT, EOMI, neck supple, trachea midline, no JVD present. Breathing is non-labored. Examination of lower extremities reveals pain-free range of motion the hips. There is no pain to palpation the trochanter bursa. Neck straight leg raise. Negative calf tenderness. Negative Homans. No signs of DVT present. The left knee reveals skin intact. There is no erythema, ecchymosis, warmth. There are no signs of infection or signs present. Range of motion is well-maintained. He does have a little laxity to the lateral side and extension mid flexion. Patella tracks nicely. He has negative effusion. Radiographs obtained in office today 6/2/2021 at the [facility] location including AP, lateral, skyline of the left knee shows evidence of femoral loosening. He does have a lucency noted to the anterior flange as well as slightly posteriorly. No acute abnormalities noted. Assessment: Status post left total knee replacement with likely early femoral loosening    Plan: At this point, we will move forward with obtaining infectious labs including CBC, ESR, CRP, IL-6, and uric acid. We will also obtain a three-phase bone scan. We will see him back afterwards for review. Surgical intervention was discussed given loosening of the femoral component and will be discussed further at his next visit.             JR Willie GATES, ESPERANZA, ATC

## 2021-06-03 ENCOUNTER — TELEPHONE (OUTPATIENT)
Dept: ORTHOPEDIC SURGERY | Age: 66
End: 2021-06-03

## 2021-06-03 NOTE — TELEPHONE ENCOUNTER
Patient's wife Erma Munroe called stating the patient is scheduled for his bone scan on Monday 06/07/21 but they told him he needs to bring a copy of his xrays with him to that appointment. He had xrays completed yesterday and he wants to pick these up from the [facility] office today. Please advise the patient or Erma Munroe back regarding this at [phone].

## 2021-06-04 LAB — IL6 SERPL-MCNC: 2.9 PG/ML (ref 0–13)

## 2021-06-07 ENCOUNTER — HOSPITAL ENCOUNTER (OUTPATIENT)
Dept: NUCLEAR MEDICINE | Age: 66
Discharge: HOME | End: 2021-06-07
Attending: PHYSICIAN ASSISTANT
Payer: MEDICARE

## 2021-06-07 PROCEDURE — A9503 TC99M MEDRONATE: HCPCS

## 2021-06-09 DIAGNOSIS — M65.9 SYNOVITIS OF LEFT KNEE: ICD-10-CM

## 2021-06-09 DIAGNOSIS — Z96.652 HISTORY OF TOTAL LEFT KNEE REPLACEMENT (TKR): ICD-10-CM

## 2021-06-09 DIAGNOSIS — M25.562 LEFT KNEE PAIN, UNSPECIFIED CHRONICITY: ICD-10-CM

## 2021-06-11 ENCOUNTER — TELEPHONE (OUTPATIENT)
Dept: ORTHOPEDIC SURGERY | Age: 66
End: 2021-06-11

## 2021-06-11 NOTE — TELEPHONE ENCOUNTER
Patient's wife Racheal Mann called stating the patient had a bone scan completed on 06/07/21 and he also had some lab work done last week as well. She says he has a follow-up appointment to go over all of these results, but it's a month out on 07/08/21 and the patient is getting anxious to get the results. She also mentions the patient seeing his PCP yesterday for bruising on his arms. She says whenever something just barely hits him he gets big bruises which is adding to his anxiety since he doesn't know why this is happening. She's asking if CARA Ley Daily can call them today to let them know the results so the patient can have peace of mind. Please advise Racheal Mann or the patient back regarding this at [phone].

## 2021-06-11 NOTE — TELEPHONE ENCOUNTER
Labs are normal    Bone scan shows inflammation    No emergency for appointment, will likely take some fluid out of his knee upon f/u visit.

## 2021-06-14 NOTE — TELEPHONE ENCOUNTER
Patient advised [address] said:    Labs are normal     Bone scan shows inflammation     No emergency for appointment, will likely take some fluid out of his knee upon f/u visit.

## 2021-06-14 NOTE — TELEPHONE ENCOUNTER
Patient called again and said he is still waiting for a call from [phone] [facility] office. Patient said no one has called him. Patient is aware that we did try to call him back. Patient is requesting a call back . Patient tel.  [phone] or to call wife Cell # [phone]

## 2021-07-06 NOTE — PROGRESS NOTES
Mer Ashley  1955     HISTORY OF PRESENT ILLNESS  Mer Ashley is a 74 y.o. male who presents today for evaluation s/p Left shoulder arthroscopic rotator cuff repair, glenohumeral joint debridement, SLAP tear and biceps stump debridement on 12/7/20. Patient has been going to PT and finished at this point. Describes pain as a 0/10. Has been taking tylenol for pain. He has been compliant with his exercises. He is having biceps pain and can't lift more than about 10 lbs without having sharp biceps pain. He does not feel like he can do all of his heavy lifting duties at work yet. Patient denies any fever, chills, chest pain, shortness of breath or calf pain. There are no new illness or injuries to report since last seen in the office. PHYSICAL EXAM:   Visit Vitals  Pulse 79   Temp 97.2 °F (36.2 °C) (Temporal)   Ht 5' 10\" (1.778 m)   Wt 237 lb (107.5 kg)   SpO2 98%   BMI 34.01 kg/m²      The patient is a well-developed, well-nourished male in no acute distress. The patient is alert and oriented times three. The patient appears to be well groomed. Mood and affect are normal.  ORTHOPEDIC EXAM of left shoulder:  Inspection: swelling not present,  Bruising not present  Incision well healed  Passive glenohumeral abduction 0-90 degrees, 180 PFF and 180 AFF, 60 PER, IR lower lumbar  Stability: Stable  Strength: gentle rotator cuff testing with no weakness or pain. 2+ distal pulses    IMPRESSION:  s/p Left shoulder arthroscopic rotator cuff repair, glenohumeral joint debridement, SLAP tear and biceps stump debridement    PLAN:   Pt doing well post operatively  His ROM looks great today. He is finished with PT at this point. I think his biceps pain is likely coming from the biceps long head tendon tear and scar tissue he has. We were not able address this in surgery due to it migrating down. I do not recommend any advanced imaging because there is nothing to do for this.  I am hopeful the pain improves with time. He will continue tylenol for this discomfort. Stressed to patient that nothing causes an increase in pain. Pt not given a refill of pain medication today. Pt given a note remain on restrictions. No lifting more than 5-15 lbs with operative arm for the next 2 months. He will be reevaluated during that time. He will then be released full duty no restrictions.   RTC PRN for his shoulder    ESPERANZA [facility] and Spine Specialist

## 2021-07-07 ENCOUNTER — OFFICE VISIT (OUTPATIENT)
Dept: ORTHOPEDIC SURGERY | Age: 66
End: 2021-07-07
Payer: OTHER MISCELLANEOUS

## 2021-07-07 VITALS
OXYGEN SATURATION: 98 % | WEIGHT: 237 LBS | TEMPERATURE: 97.2 F | HEART RATE: 79 BPM | BODY MASS INDEX: 33.93 KG/M2 | HEIGHT: 70 IN

## 2021-07-07 DIAGNOSIS — S46.012A TRAUMATIC COMPLETE TEAR OF LEFT ROTATOR CUFF, INITIAL ENCOUNTER: Primary | ICD-10-CM

## 2021-07-07 DIAGNOSIS — Z98.890 STATUS POST ARTHROSCOPY OF LEFT SHOULDER: ICD-10-CM

## 2021-07-07 DIAGNOSIS — S46.112A RUPTURE OF LEFT LONG HEAD BICEPS TENDON, INITIAL ENCOUNTER: ICD-10-CM

## 2021-07-07 PROCEDURE — G8536 NO DOC ELDER MAL SCRN: HCPCS | Performed by: ORTHOPAEDIC SURGERY

## 2021-07-07 PROCEDURE — 1101F PT FALLS ASSESS-DOCD LE1/YR: CPT | Performed by: ORTHOPAEDIC SURGERY

## 2021-07-07 PROCEDURE — G8417 CALC BMI ABV UP PARAM F/U: HCPCS | Performed by: ORTHOPAEDIC SURGERY

## 2021-07-07 PROCEDURE — G8427 DOCREV CUR MEDS BY ELIG CLIN: HCPCS | Performed by: ORTHOPAEDIC SURGERY

## 2021-07-07 PROCEDURE — 99213 OFFICE O/P EST LOW 20 MIN: CPT | Performed by: ORTHOPAEDIC SURGERY

## 2021-07-07 PROCEDURE — G8432 DEP SCR NOT DOC, RNG: HCPCS | Performed by: ORTHOPAEDIC SURGERY

## 2021-07-07 PROCEDURE — G8756 NO BP MEASURE DOC: HCPCS | Performed by: ORTHOPAEDIC SURGERY

## 2021-07-07 PROCEDURE — 3017F COLORECTAL CA SCREEN DOC REV: CPT | Performed by: ORTHOPAEDIC SURGERY

## 2021-07-07 NOTE — LETTER
NOTIFICATION RETURN TO WORK / SCHOOL    7/7/2021 2:15 PM    Mr. Starr Hendrix  [address] [address]      To Whom It May Concern:    Starr Hendrix is currently under the care of [address] [place] [address]. He will continue restrictions. No lifting more than 5-15 lbs with operative arm for the next 2 months. Will be reevaluated during that time. At that time he will be released to full duty no restrictions. If there are questions or concerns please have the patient contact our office.         Sincerely,      CARA Keenan

## 2021-07-08 ENCOUNTER — OFFICE VISIT (OUTPATIENT)
Dept: ORTHOPEDIC SURGERY | Age: 66
End: 2021-07-08
Payer: MEDICARE

## 2021-07-08 VITALS
HEART RATE: 86 BPM | RESPIRATION RATE: 16 BRPM | HEIGHT: 70 IN | WEIGHT: 234.4 LBS | TEMPERATURE: 96 F | BODY MASS INDEX: 33.56 KG/M2 | OXYGEN SATURATION: 98 %

## 2021-07-08 DIAGNOSIS — T84.093A FAILED TOTAL LEFT KNEE REPLACEMENT, INITIAL ENCOUNTER (HCC): Primary | ICD-10-CM

## 2021-07-08 DIAGNOSIS — M65.9 SYNOVITIS OF LEFT KNEE: ICD-10-CM

## 2021-07-08 DIAGNOSIS — Z96.652 HISTORY OF TOTAL LEFT KNEE REPLACEMENT (TKR): ICD-10-CM

## 2021-07-08 DIAGNOSIS — M70.61 GREATER TROCHANTERIC BURSITIS OF RIGHT HIP: ICD-10-CM

## 2021-07-08 DIAGNOSIS — M25.562 LEFT KNEE PAIN, UNSPECIFIED CHRONICITY: ICD-10-CM

## 2021-07-08 DIAGNOSIS — M70.52 PES ANSERINUS BURSITIS OF LEFT KNEE: ICD-10-CM

## 2021-07-08 PROCEDURE — 3017F COLORECTAL CA SCREEN DOC REV: CPT | Performed by: PHYSICIAN ASSISTANT

## 2021-07-08 PROCEDURE — G8756 NO BP MEASURE DOC: HCPCS | Performed by: PHYSICIAN ASSISTANT

## 2021-07-08 PROCEDURE — 99214 OFFICE O/P EST MOD 30 MIN: CPT | Performed by: PHYSICIAN ASSISTANT

## 2021-07-08 PROCEDURE — 1101F PT FALLS ASSESS-DOCD LE1/YR: CPT | Performed by: PHYSICIAN ASSISTANT

## 2021-07-08 PROCEDURE — G8417 CALC BMI ABV UP PARAM F/U: HCPCS | Performed by: PHYSICIAN ASSISTANT

## 2021-07-08 PROCEDURE — G8432 DEP SCR NOT DOC, RNG: HCPCS | Performed by: PHYSICIAN ASSISTANT

## 2021-07-08 PROCEDURE — 20611 DRAIN/INJ JOINT/BURSA W/US: CPT | Performed by: PHYSICIAN ASSISTANT

## 2021-07-08 PROCEDURE — G8536 NO DOC ELDER MAL SCRN: HCPCS | Performed by: PHYSICIAN ASSISTANT

## 2021-07-08 PROCEDURE — G8427 DOCREV CUR MEDS BY ELIG CLIN: HCPCS | Performed by: PHYSICIAN ASSISTANT

## 2021-07-08 RX ORDER — BETAMETHASONE SODIUM PHOSPHATE AND BETAMETHASONE ACETATE 3; 3 MG/ML; MG/ML
3 INJECTION, SUSPENSION INTRA-ARTICULAR; INTRALESIONAL; INTRAMUSCULAR; SOFT TISSUE ONCE
Status: CANCELLED | OUTPATIENT
Start: 2021-07-08 | End: 2021-07-09

## 2021-07-08 RX ORDER — BETAMETHASONE SODIUM PHOSPHATE AND BETAMETHASONE ACETATE 3; 3 MG/ML; MG/ML
3 INJECTION, SUSPENSION INTRA-ARTICULAR; INTRALESIONAL; INTRAMUSCULAR; SOFT TISSUE ONCE
Status: COMPLETED | OUTPATIENT
Start: 2021-07-08 | End: 2021-07-08

## 2021-07-08 RX ORDER — BETAMETHASONE SODIUM PHOSPHATE AND BETAMETHASONE ACETATE 3; 3 MG/ML; MG/ML
6 INJECTION, SUSPENSION INTRA-ARTICULAR; INTRALESIONAL; INTRAMUSCULAR; SOFT TISSUE ONCE
Status: COMPLETED | OUTPATIENT
Start: 2021-07-08 | End: 2021-07-08

## 2021-07-08 RX ADMIN — BETAMETHASONE SODIUM PHOSPHATE AND BETAMETHASONE ACETATE 3 MG: 3; 3 INJECTION, SUSPENSION INTRA-ARTICULAR; INTRALESIONAL; INTRAMUSCULAR; SOFT TISSUE at 15:03

## 2021-07-08 RX ADMIN — BETAMETHASONE SODIUM PHOSPHATE AND BETAMETHASONE ACETATE 6 MG: 3; 3 INJECTION, SUSPENSION INTRA-ARTICULAR; INTRALESIONAL; INTRAMUSCULAR; SOFT TISSUE at 15:31

## 2021-07-08 NOTE — PROGRESS NOTES
[address] [facility], [address] [facility]  [phone]           Patient: Marc Jewell                MRN: [id number]       SSN: [id number]  YOB: 1955        AGE: 74 y.o. SEX: male  Body mass index is 33.63 kg/m². PCP: Teofilo Delgado MD  07/08/21            REVIEW OF SYSTEMS:  Constitutional: Negative for fever, chills, weight loss and malaise/fatigue. HENT: Negative. Eyes: Negative. Respiratory: Negative. Cardiovascular: Negative. Gastrointestinal: No bowel incontinence or constipation. Genitourinary: No bladder incontinence or saddle anesthesia. Skin: Negative. Neurological: Negative. Endo/Heme/Allergies: Negative. Psychiatric/Behavioral: Negative. Musculoskeletal: As per HPI above. Past Medical History:   Diagnosis Date    Arthritis     Bleeding     Chronic pain     knee and shoulder    GERD (gastroesophageal reflux disease)     Headache(784.0)     migraine    High cholesterol     Hypertension     Lower back pain 11/6/2010    Other chest pain     Personal history of prostate cancer     Pure hypercholesterolemia     Right buttock pain 11/6/2010    Right foot pain     Rotator cuff tear     left-since 2010, worsened tear Jan.    Rotator cuff tear, right     Spinal stenosis     Tendonitis, tibialis     anterior    Thromboembolus (HCC)     3 after sx last one 2000         Current Outpatient Medications:     gabapentin (NEURONTIN) 100 mg capsule, TAKE 1 CAPSULE BY MOUTH NIGHTLY FOR 1 WEEK, THEN INCREASE TO 2 CAPSULES NIGHTLY, Disp: 60 Capsule, Rfl: 2    metaxalone (Skelaxin) 800 mg tablet, Take 1 tab by mouth BID-TID prn muscle spasm, Disp: 60 Tab, Rfl: 1    diclofenac (VOLTAREN) 1 % gel, Apply 4 g to affected area four (4) times daily. , Disp: 500 g, Rfl: 3    L gasseri/B bifidum/B longum ([facility]' [address]), Take  by mouth nightly., Disp: , Rfl:     lansoprazole (PREVACID) 30 mg capsule, Take 30 mg by mouth daily. , Disp: , Rfl:     warfarin (COUMADIN) 5 mg tablet, TAKE 2 AND 1/2 TABLETS BY MOUTH DAILY OR AS DIRECTED (Patient taking differently: Take  by mouth daily. TAKE 2 AND 1/2 TABLETS BY MOUTH TUES, THURS, SAT or as directed), Disp: 75 Tab, Rfl: 0    lisinopril-hydroCHLOROthiazide (PRINZIDE, ZESTORETIC) 20-12.5 mg per tablet, TAKE 1 TABLET BY MOUTH DAILY (Patient taking differently: Take 1 Tab by mouth daily. TAKE 1 TABLET BY MOUTH DAILY), Disp: 90 Tab, Rfl: 1    labetalol (NORMODYNE) 100 mg tablet, TAKE 1 TABLET BY MOUTH TWICE DAILY. STOP VERAPAMIL (Patient taking differently: Take  by mouth two (2) times a day.), Disp: 180 Tab, Rfl: 0    butalbital-acetaminophen-caff (FIORICET) -40 mg per capsule, 2 cap three times per day as needed for headache (Patient taking differently: Take  by mouth daily. 2 cap three times per day as needed for headache), Disp: 180 Cap, Rfl: 1    ALPRAZolam (XANAX) 0.5 mg tablet, Take one half(1/2) tab to one(1) tab by mouth at bedtime as needed for sleep (Patient taking differently: Take  by mouth nightly as needed. Take one half(1/2) tab to one(1) tab by mouth at bedtime as needed for sleep), Disp: 30 Tab, Rfl: 0    acetaminophen (TYLENOL) 500 mg tablet, Take 1,000 mg by mouth every six (6) hours as needed for Pain., Disp: , Rfl:     methylPREDNISolone (MEDROL DOSEPACK) 4 mg tablet, Per dose pack instructions (Patient not taking: Reported on 6/2/2021), Disp: 1 Dose Pack, Rfl: 0    clindamycin (CLEOCIN) 300 mg capsule, Take 2 capsules po one hour prior to appt (Patient not taking: Reported on 6/2/2021), Disp: 2 Cap, Rfl: 3    celecoxib (CELEBREX) 200 mg capsule, Take 1 Cap by mouth daily.  (Patient not taking: Reported on 7/7/2021), Disp: 90 Cap, Rfl: 2    montelukast (SINGULAIR) 10 mg tablet, TAKE 1 TABLET BY MOUTH EVERY DAY (Patient not taking: Reported on 7/7/2021), Disp: 90 Tab, Rfl: 0    Allergies   Allergen Reactions    Amoxicillin Itching    Augmentin [Amoxicillin-Pot Clavulanate] Itching    Chlorhexidine Towelette Itching    Hibiclens [Chlorhexidine Gluconate] Itching    Milk Containing Products Diarrhea    Nsaids (Non-Steroidal Anti-Inflammatory Drug) Other (comments)     On blood thinner, contraindicated.  Penicillins Rash    Requip [Ropinirole] Nausea and Vomiting       Social History     Socioeconomic History    Marital status:      Spouse name: Not on file    Number of children: Not on file    Years of education: Not on file    Highest education level: Not on file   Occupational History    Not on file   Tobacco Use    Smoking status: Never Smoker    Smokeless tobacco: Never Used   Substance and Sexual Activity    Alcohol use: No    Drug use: No    Sexual activity: Not Currently   Other Topics Concern     Service Not Asked    Blood Transfusions Not Asked    Caffeine Concern Not Asked    Occupational Exposure Not Asked    Hobby Hazards Not Asked    Sleep Concern Not Asked    Stress Concern Not Asked    Weight Concern Not Asked    Special Diet Not Asked    Back Care Not Asked    Exercise Not Asked    Bike Helmet Not Asked   [address] Not Asked    Self-Exams Not Asked   Social History Narrative    Not on file     Social Determinants of Health     Financial Resource Strain:     Difficulty of Paying Living Expenses:    Food Insecurity:     Worried About Running Out of Food in the Last Year:     Ran Out of Food in the Last Year:    Transportation Needs:     Lack of Transportation (Medical):      Lack of Transportation (Non-Medical):    Physical Activity:     Days of Exercise per Week:     Minutes of Exercise per Session:    Stress:     Feeling of Stress :    Social Connections:     Frequency of Communication with Friends and Family:     Frequency of Social Gatherings with Friends and Family:     Attends Rastafari Services:     Active Member of Clubs or Organizations:  Attends Club or Organization Meetings:     Marital Status:    Intimate Partner Violence:     Fear of Current or Ex-Partner:     Emotionally Abused:     Physically Abused:     Sexually Abused:        Past Surgical History:   Procedure Laterality Date    FOOT/TOES SURGERY PROC UNLISTED      HX BACK SURGERY      HX HEENT Right 09/2018    eye surgery, macular     HX KNEE REPLACEMENT Left     HX ORTHOPAEDIC  06-25-12    Right foot with excision of bursa and adipose tissue from fifth metatarsal base by Dr. Angelica Seo      lower back (1992 and 2000)    HX OTHER SURGICAL      left foot (2008)    HX OTHER SURGICAL      Retina repair     HX PROSTATECTOMY  11/2018    HX ROTATOR CUFF REPAIR Right 01/28/2019    by Dr. Lamont Dumont LAMINOTOMY         Patient seen evaluate today for mainly his left knee. He is status post left total knee replacement surgery a couple years out and originally did well. He did have a fall about a year after the surgery on which he has some contusions to his knee. He has been having some discomfort in his knee which has been worsening. He does report some start up discomfort as well as increasing discomfort as he walks. No feelings of instability. He reports having right lateral based hip discomfort.,  He does have a history of trochanteric bursitis. Is requesting a cortisone injection. He reports increased discomfort in ambulation as well as on his right side at night. Patient denies recent fevers, chills, chest pain, SOB, or injuries. No recent systemic changes noted. A 12-point review of systems is performed today. Pertinent positives are noted. All other systems reviewed and otherwise are negative. Physical exam: General: Alert and oriented x3, nad.  well-developed, well nourished. normal affect, AF. NC/AT, EOMI, neck supple, trachea midline, no JVD present. Breathing is non-labored. Semination of the lower extremities reveals pain-free range of motion hips. Does have pain to palpation trochanter bursa on the right side. Negative straight leg raise. Negative calf tenderness. Negative Homans. No signs of DVT present. The left knee reveals skin intact. There is no erythema, ecchymosis, warmth. There are no signs of infection or cellulitis present. Range of motion is well-preserved. Stability is good. Patella tracks nicely without rubs or crepitus noted. He does have some tenderness palpation about the knee mainly to the femoral aspect. Review of labs: WBC-5.9, CRP-0.9, IL-6-2.9, ESR-14, uric acid-7.1. Review of three-phase bone scan:  IMPRESSION     Left knee periprosthetic uptake pattern is nonspecific and relatively similar to  2018. This is favorable for ongoing postoperative uptake. No definite findings  suggestive of loosening.     There is associated mild hyperemia and soft tissue uptake with the left knee  delayed scan activity. Clinical correlation for infection recommended. In the  absence of clinical concern for infection, blood flow and soft tissue uptake may  indicate nonspecific soft tissue inflammation.     No scintigraphic findings suggestive of osseous metastatic disease.  -New areas of uptake in the right temporal mandibular region and anterior left  mandible may be due to arthritis and odontogenic disease respectively. Correlate  clinically.     Increased uptake of the right foot and to a lesser degree the left foot. Correlate for underlying arthritis or trauma    Review of previous radiographs shows evidence for femoral loosening with the anterior flange having reabsorption. Assessment: #1 failing left total knee replacement, #2 right hip trochanter bursitis    Plan: At this point, we discussed treatment options. We will move for the cortisone injection for the right hip, trochanteric bursa.   After informed consent, under aseptic conditions, with US guided assitance, the right hip was prepped with betadine and a mxiture of 3ml 1% lidocaine and 6mg of celestone was injected without complications. The patient tolerated the injection well. The patient is instructed on post-injection care. Surgical intervention was discussed regarding his left knee and moving forward with revision surgery. He would like to proceed with the surgical option. The alternatives, risks, complications, expected outcome were discussed. The patient understands and wished to proceed. Chart reviewed for the following:  Cinthya KO PA-C, have reviewed the History, Physical and updated the Allergic reactions for Fermin Sables? TIME OUT performed immediately prior to start of procedure:  Cinthya KO PA-C, have performed the following reviews on Fermin Sables prior to the start of the procedure:  ????????  * Patient was identified by name and date of birth   * Agreement on procedure being performed was verified  * Risks and Benefits explained to the patient  * Procedure site verified and marked as necessary  * Patient was positioned for comfort  * Consent was signed and verified    Time:2:58 PM    Body part: right hp, intra-bursal    Medication & Dose: 3ml 1% lidocaine and 6mg celstone    Date of procedure: 07/08/21    Procedure performed by: Cinthya Ellsworth PA-C    Provider assisted by: none    Patient assisted by: self    How tolerated by patient: tolerated the procedure well with no complications    Post Procedural Pain Scale: 5    Comments:   [address] using a frequency of 10MHz with a 12L-RS transducer head was used to confirm needle placement.   Ultrasound images captured using [address] Ultrasound machine and scanned into patient's chart       Emelina Gardiner PA-C, ATC

## 2021-07-15 ENCOUNTER — DOCUMENTATION ONLY (OUTPATIENT)
Dept: ORTHOPEDIC SURGERY | Age: 66
End: 2021-07-15

## 2021-07-15 NOTE — PROGRESS NOTES
Faxed 7/7/21 office note & work status to [facility] Lily Tate) at [phone] per w/c.

## 2021-08-03 PROBLEM — C61 PROSTATE CANCER (HCC): Status: RESOLVED | Noted: 2018-10-08 | Resolved: 2021-08-03

## 2021-08-03 PROBLEM — C61 PROSTATE CA (HCC): Status: RESOLVED | Noted: 2018-11-12 | Resolved: 2021-08-03

## 2021-08-16 ENCOUNTER — TELEPHONE (OUTPATIENT)
Dept: ORTHOPEDIC SURGERY | Age: 66
End: 2021-08-16

## 2021-08-16 NOTE — TELEPHONE ENCOUNTER
Patient and \"spouse\" called in stating he needs pain medication before 8/24 and he is unable to wait until then due to back pain.      Patient's contact is [phone]

## 2021-08-18 NOTE — TELEPHONE ENCOUNTER
Returned call to patient, verified Name/, per patient he has made an appt with his PCP to discuss his problem. Per patient he has periodic severe headaches and his PCP normally help him with the medication. Informed patient he has not been seen by Dr. Neha Rubio since April, he can make a VV appt tomorrow for 1300 if he would like. Per patient he will call back to make the VV if he cannot get help from his PCP. Patient was concerned with having to return call to the [facility], as the person he spoke to originally \"acted like she didn't want to help me, she almost didn't even put a message in for you to call me back. \"    Per patient the person he spoke with told him we don't even check the messages anyway. Per patient he is certain if he gets the same girl, she's not going to know what to do. Informed patient I would put in a message clearly stating that if he returns call to our office he will need a VV on tomorrow 21 @ 1300 with Dr. Neha Rubio. Whomever answers the phone from the call center will be able to make that appt for him. No further action required at this time.

## 2021-08-24 ENCOUNTER — OFFICE VISIT (OUTPATIENT)
Dept: ORTHOPEDIC SURGERY | Age: 66
End: 2021-08-24

## 2021-08-24 ENCOUNTER — VIRTUAL VISIT (OUTPATIENT)
Dept: ORTHOPEDIC SURGERY | Age: 66
End: 2021-08-24

## 2021-08-24 DIAGNOSIS — M62.838 MUSCLE SPASM: ICD-10-CM

## 2021-08-24 DIAGNOSIS — M51.27 LUMBAGO-SCIATICA DUE TO DISPLACEMENT OF LUMBAR INTERVERTEBRAL DISC: ICD-10-CM

## 2021-08-24 DIAGNOSIS — M48.061 SPINAL STENOSIS OF LUMBAR REGION WITHOUT NEUROGENIC CLAUDICATION: ICD-10-CM

## 2021-08-24 DIAGNOSIS — M51.26 DISPLACEMENT OF LUMBAR INTERVERTEBRAL DISC WITHOUT MYELOPATHY: Primary | ICD-10-CM

## 2021-08-24 DIAGNOSIS — M47.816 FACET ARTHROPATHY, LUMBAR: ICD-10-CM

## 2021-08-24 PROCEDURE — G8536 NO DOC ELDER MAL SCRN: HCPCS | Performed by: PHYSICAL MEDICINE & REHABILITATION

## 2021-08-24 PROCEDURE — G8427 DOCREV CUR MEDS BY ELIG CLIN: HCPCS | Performed by: PHYSICAL MEDICINE & REHABILITATION

## 2021-08-24 PROCEDURE — G8432 DEP SCR NOT DOC, RNG: HCPCS | Performed by: PHYSICAL MEDICINE & REHABILITATION

## 2021-08-24 PROCEDURE — G8756 NO BP MEASURE DOC: HCPCS | Performed by: PHYSICAL MEDICINE & REHABILITATION

## 2021-08-24 PROCEDURE — 99213 OFFICE O/P EST LOW 20 MIN: CPT | Performed by: PHYSICAL MEDICINE & REHABILITATION

## 2021-08-24 PROCEDURE — 3017F COLORECTAL CA SCREEN DOC REV: CPT | Performed by: PHYSICAL MEDICINE & REHABILITATION

## 2021-08-24 PROCEDURE — G8417 CALC BMI ABV UP PARAM F/U: HCPCS | Performed by: PHYSICAL MEDICINE & REHABILITATION

## 2021-08-24 PROCEDURE — 1101F PT FALLS ASSESS-DOCD LE1/YR: CPT | Performed by: PHYSICAL MEDICINE & REHABILITATION

## 2021-08-24 RX ORDER — METAXALONE 800 MG/1
TABLET ORAL
Qty: 60 TABLET | Refills: 2 | Status: ON HOLD | OUTPATIENT
Start: 2021-08-24 | End: 2022-03-09 | Stop reason: ALTCHOICE

## 2021-08-24 NOTE — PROGRESS NOTES
[facility]  [address], [facility] [address]  Phone: [phone]  Fax: [phone]    Pt's YOB: 1955    ASSESSMENT   Diagnoses and all orders for this visit:    1. Displacement of lumbar intervertebral disc without myelopathy    2. Lumbago-sciatica due to displacement of lumbar intervertebral disc    3. Facet arthropathy, lumbar    4. Muscle spasm  -     metaxalone (Skelaxin) 800 mg tablet; Take 1 tab by mouth BID-TID prn muscle spasm    5. Spinal stenosis of lumbar region without neurogenic claudication         IMPRESSION AND PLAN:  Faiza Brito is a 74 y.o. male with history of lumbar pain. Pt denies any change in symptoms since his last office visit. He reports continued pain in the lower back with activity and when standing for prolonged periods of time. Pt takes Skelaxin 800 mg 1-2 tabs daily as needed. 1) Pt was given information on lumbar arthritis exercises. 2) He received a refill of Skelaxin 800 mg.   3) Mr. Marie Hoffmann has a reminder for a \"due or due soon\" health maintenance. I have asked that he contact his primary care provider, Gil Chaves MD, for follow-up on this health maintenance. 4)  demonstrated consistency with prescribing. Follow-up and Dispositions    · Return in 3 months (on 11/24/2021) for Medication follow up. CPT Codes 82070-87809 for Established Patients may apply to this TeleHealth Visit. Time-based coding, delete if not needed: I spent at least 15 minutes with this established patient, and >50% of the time was spent counseling and/or coordinating care regarding his symptoms, medications, and recent left knee pain. Due to this being a TeleHealth evaluation, many elements of the physical examination are unable to be assessed.      Pursuant to the emergency declaration under the [facility] [place], [address] and Response Supplemental Appropriations Act, this Virtual Visit was conducted, with patient's consent, to reduce the patient's risk of exposure to COVID-19 and provide continuity of care for an established patient. Services were provided through a video synchronous discussion virtually to substitute for in-person clinic visit. HISTORY OF PRESENT ILLNESS:  Shannan Wall is a 74 y.o. male with history of lumbar pain and presents to the office today for virtual follow up from his home. . Pt denies any significant change in symptoms since his last office visit. He reports continued pain in the lower back with activity and when standing for prolonged periods of time. He also reports a flare of pain with helping his son move recently. Pt has been prescribed Skelaxin 800 mg 1-2 tabs daily as needed. He takes Norco very rarely as his pain warrants noting it causes worsening headaches. Pt also generally takes Tylenol and OTC butalbital as needed. Of note, he is currently on Coumadin. Of note, he had a left knee replacement in 2018. Pt has experienced sharp pain in the left knee x several months and recent imaging demonstrated loosening hardware. He is scheduled for a revision on 11/03/2021. Pt at this time desires to continue with current care. He notes that he was exposed to COVID-19 through his son.     Pain Scale: /10    PCP: Lucrezia Gaucher, MD     Past Medical History:   Diagnosis Date    Arthritis     Bleeding     Chronic pain     knee and shoulder    GERD (gastroesophageal reflux disease)     Headache(784.0)     migraine    High cholesterol     Hypertension     Lower back pain 11/6/2010    Other chest pain     Personal history of prostate cancer     Pure hypercholesterolemia     Right buttock pain 11/6/2010    Right foot pain     Rotator cuff tear     left-since 2010, worsened tear Young.    Rotator cuff tear, right     Spinal stenosis     Tendonitis, tibialis     anterior    [facility]     3 after sx last one 2000        Social History     Socioeconomic History    Marital status:      Spouse name: Not on file    Number of children: Not on file    Years of education: Not on file    Highest education level: Not on file   Occupational History    Not on file   Tobacco Use    Smoking status: Never Smoker    Smokeless tobacco: Never Used   Substance and Sexual Activity    Alcohol use: No    Drug use: No    Sexual activity: Not Currently   Other Topics Concern     Service Not Asked    Blood Transfusions Not Asked    Caffeine Concern Not Asked    Occupational Exposure Not Asked    Hobby Hazards Not Asked    Sleep Concern Not Asked    Stress Concern Not Asked    Weight Concern Not Asked    Special Diet Not Asked    Back Care Not Asked    Exercise Not Asked    Bike Helmet Not Asked   [address] Not Asked    Self-Exams Not Asked   Social History Narrative    Not on file     Social Determinants of Health     Financial Resource Strain:     Difficulty of Paying Living Expenses:    Food Insecurity:     Worried About Running Out of Food in the Last Year:     Ran Out of Food in the Last Year:    Transportation Needs:     Lack of Transportation (Medical):      Lack of Transportation (Non-Medical):    Physical Activity:     Days of Exercise per Week:     Minutes of Exercise per Session:    Stress:     Feeling of Stress :    Social Connections:     Frequency of Communication with Friends and Family:     Frequency of Social Gatherings with Friends and Family:     Attends [employer] Services:     Active Member of Clubs or Organizations:     Attends Club or Organization Meetings:     Marital Status:    Intimate Partner Violence:     Fear of Current or Ex-Partner:     Emotionally Abused:     Physically Abused:     Sexually Abused:        Current Outpatient Medications   Medication Sig Dispense Refill    metaxalone (Skelaxin) 800 mg tablet Take 1 tab by mouth BID-TID prn muscle spasm 60 Tablet 2    gabapentin (NEURONTIN) 100 mg capsule TAKE 1 CAPSULE BY MOUTH NIGHTLY FOR 1 WEEK, THEN INCREASE TO 2 CAPSULES NIGHTLY (Patient taking differently: Take 200 mg by mouth nightly.) 60 Capsule 2    diclofenac (VOLTAREN) 1 % gel Apply 4 g to affected area four (4) times daily. 500 g 3    L gasseri/B bifidum/B longum ([facility] [address]) Take  by mouth nightly.  lansoprazole (PREVACID) 30 mg capsule Take 30 mg by mouth daily.  warfarin (COUMADIN) 5 mg tablet TAKE 2 AND 1/2 TABLETS BY MOUTH DAILY OR AS DIRECTED (Patient taking differently: Take  by mouth daily. TAKE 2 AND 1/2 TABLETS BY MOUTH TUES, THURS, SAT or as directed) 75 Tab 0    lisinopril-hydroCHLOROthiazide (PRINZIDE, ZESTORETIC) 20-12.5 mg per tablet TAKE 1 TABLET BY MOUTH DAILY (Patient taking differently: Take 1 Tab by mouth daily. TAKE 1 TABLET BY MOUTH DAILY) 90 Tab 1    labetalol (NORMODYNE) 100 mg tablet TAKE 1 TABLET BY MOUTH TWICE DAILY. STOP VERAPAMIL (Patient taking differently: Take  by mouth two (2) times a day.) 180 Tab 0    butalbital-acetaminophen-caff (FIORICET) -40 mg per capsule 2 cap three times per day as needed for headache (Patient taking differently: Take  by mouth daily. 2 cap three times per day as needed for headache) 180 Cap 1    ALPRAZolam (XANAX) 0.5 mg tablet Take one half(1/2) tab to one(1) tab by mouth at bedtime as needed for sleep (Patient taking differently: Take  by mouth nightly as needed. Take one half(1/2) tab to one(1) tab by mouth at bedtime as needed for sleep) 30 Tab 0    acetaminophen (TYLENOL) 500 mg tablet Take 1,000 mg by mouth every six (6) hours as needed for Pain.       methylPREDNISolone (MEDROL DOSEPACK) 4 mg tablet Per dose pack instructions (Patient not taking: Reported on 6/2/2021) 1 Dose Pack 0    clindamycin (CLEOCIN) 300 mg capsule Take 2 capsules po one hour prior to appt (Patient not taking: Reported on 6/2/2021) 2 Cap 3    celecoxib (CELEBREX) 200 mg capsule Take 1 Cap by mouth daily. (Patient not taking: Reported on 7/7/2021) 90 Cap 2    montelukast (SINGULAIR) 10 mg tablet TAKE 1 TABLET BY MOUTH EVERY DAY (Patient not taking: Reported on 7/7/2021) 90 Tab 0       Allergies   Allergen Reactions    Amoxicillin Itching    Augmentin [Amoxicillin-Pot Clavulanate] Itching    Chlorhexidine Towelette Itching    Hibiclens [Chlorhexidine Gluconate] Itching    Milk Containing Products Diarrhea    Nsaids (Non-Steroidal Anti-Inflammatory Drug) Other (comments)     On blood thinner, contraindicated.  Penicillins Rash    Requip [Ropinirole] Nausea and Vomiting         REVIEW OF SYSTEMS    Constitutional: Negative for fever, chills, or weight change. Respiratory: Negative for cough or shortness of breath. Cardiovascular: Negative for chest pain or palpitations. Gastrointestinal: Negative for acid reflux, change in bowel habits, or constipation. Genitourinary: Negative for dysuria and flank pain. Musculoskeletal: Positive for lumbar and left knee pain. Neurological: Negative for headaches, dizziness, or numbness. Endo/Heme/Allergies: Negative for increased bruising. IMAGING:    Lumbar spine MRI from 01/24/2018 was personally reviewed with the patient and demonstrated:  Results from [facility] on 01/24/18   MRI LUMB SPINE W WO CONT     Narrative MR lumbar spine with and without contrast    CPT CODE: 67627    HISTORY: Chronic and worsening low back pain with left lower extremity pain. Increasing weakness. Remote history of surgeries. No recent injury. COMPARISON: MRI January 2013. TECHNIQUE: Lumbar spine scanned with axial and sagittal T1W scans, axial and  sagittal T2W scans, and with post gadolinium axial and sagittal T1W scans. Contrast used: 10 cc Gadavist.    FINDINGS:     Prior laminotomies on the left at L4-5 and bilaterally at L5-S1.  No fracture,  bone destruction, or fluid collection. Intact lordosis. Normal vertebral body heights. Small less than 5 mm low signal  focus within anterior L4, developed since 2013. No similar focus elsewhere. No  aggressive features. Otherwise generally fatty marrow signal with some chronic  fatty endplate changes straddling L5-S1. Moderate to severe disc space narrowing  and disc desiccation of that level. Mild to moderate narrowing and desiccation  of L3-4 and L4-5. Conus at T12-L1. Axial imaging correlation:    T12-L1:  Patent canal and foramina. L1-L2: Patent canal and foramina. L2-L3: Patent canal and foramina. L3-L4: Broad-based disc osteophyte complex. Bilateral facet arthropathy with  ligamentum flavum thickening and buckling. Moderate concentric spinal stenosis. Particular narrowing of lateral recesses with transient distortion of the  crossing L4 nerves. Axial T2 image 20. Patent foramina.  Progression of  degenerative findings. L4-L5: Postoperative level. Mild broad-based disc osteophyte complex with a  small amount of enhancing scar tissue. Hypertrophic facet arthropathy. Moderate  spinal stenosis. Narrowing of the lateral recesses left more than right. Transient distortion of the crossing nerves particularly left L5. Axial T2 image  13. Mild foraminal stenoses.  Unchanged. L5-S1: Postoperative level. Broad-based disc osteophyte complex with enhancing  scar tissue centrally. Hypertrophic facet arthropathy. No significant spinal  stenosis. Moderately severe bilateral foraminal stenoses. Unchanged. Incidental imaging of regional soft tissues unremarkable.                  Impression IMPRESSION:    1. Some progression of degenerative disc and facet disease at L3-4, with  moderate spinal stenosis and potentially impingement of the crossing L4 nerves,  left more than right.   2. Stable postsurgical and degenerative findings at L4-5 and L5-S1.               Written by Leroy Galo, as dictated by Carine Fuentes, MD. KO, Dr. Jonnie Fierro confirm that all documentation is accurate.

## 2021-08-24 NOTE — PATIENT INSTRUCTIONS
Low Back Arthritis: Exercises  Introduction  Here are some examples of typical rehabilitation exercises for your condition. Start each exercise slowly. Ease off the exercise if you start to have pain. Your doctor or physical therapist will tell you when you can start these exercises and which ones will work best for you. When you are not being active, find a comfortable position for rest. Some people are comfortable on the floor or a medium-firm bed with a small pillow under their head and another under their knees. Some people prefer to lie on their side with a pillow between their knees. Don't stay in one position for too long. Take short walks (10 to 20 minutes) every 2 to 3 hours. Avoid slopes, hills, and stairs until you feel better. Walk only distances you can manage without pain, especially leg pain. How to do the exercises  Pelvic tilt   1. Lie on your back with your knees bent. 2. \"Brace\" your stomachtighten your muscles by pulling in and imagining your belly button moving toward your spine. 3. Press your lower back into the floor. You should feel your hips and pelvis rock back. 4. Hold for 6 seconds while breathing smoothly. 5. Relax and allow your pelvis and hips to rock forward. 6. Repeat 8 to 12 times. Back stretches   1. Get down on your hands and knees on the floor. 2. Relax your head and allow it to droop. Round your back up toward the ceiling until you feel a nice stretch in your upper, middle, and lower back. Hold this stretch for as long as it feels comfortable, or about 15 to 30 seconds. 3. Return to the starting position with a flat back while you are on your hands and knees. 4. Let your back sway by pressing your stomach toward the floor. Lift your buttocks toward the ceiling. 5. Hold this position for 15 to 30 seconds. 6. Repeat 2 to 4 times. Follow-up care is a key part of your treatment and safety.  Be sure to make and go to all appointments, and call your doctor if you are having problems. It's also a good idea to know your test results and keep a list of the medicines you take. Where can you learn more? Go to http://www.[employer].com/  Enter T094 in the search box to learn more about \"Low Back Arthritis: Exercises. \"  Current as of: November 16, 2020               Content Version: 12.8  © 8061-8099 [employer]. Care instructions adapted under license by [employer] (which disclaims liability or warranty for this information). If you have questions about a medical condition or this instruction, always ask your healthcare professional. [place] [address] any warranty or liability for your use of this information.

## 2021-08-24 NOTE — LETTER
8/30/2021    Patient: Millie Payne   YOB: 1955   Date of Visit: 8/24/2021     Rafaela Moran MD  [address] [place] [id number]  Via Fax: [phone]    Dear Rafaela Moran MD,      Thank you for referring Mr. Shelia Salomon to [facility] [place] for evaluation. My notes for this consultation are attached. If you have questions, please do not hesitate to call me. I look forward to following your patient along with you.       Sincerely,    Priya Lozano MD

## 2021-08-25 DIAGNOSIS — M48.061 SPINAL STENOSIS OF LUMBAR REGION WITHOUT NEUROGENIC CLAUDICATION: ICD-10-CM

## 2021-08-25 DIAGNOSIS — M47.816 FACET ARTHROPATHY, LUMBAR: ICD-10-CM

## 2021-08-25 DIAGNOSIS — G62.9 NEUROPATHY: ICD-10-CM

## 2021-08-27 RX ORDER — GABAPENTIN 100 MG/1
CAPSULE ORAL
Qty: 60 CAPSULE | OUTPATIENT
Start: 2021-08-27

## 2021-08-30 NOTE — TELEPHONE ENCOUNTER
I attempted to reach the pt about this refill request. He was not able to be reached on either the mobile or home number. A brief message was left asking for a return call to the office to verify the request of refill for gabapentin. The number to the office was provided. There are no pt names on the voicemail. No names were used in the message left.

## 2021-09-01 DIAGNOSIS — M47.816 FACET ARTHROPATHY, LUMBAR: ICD-10-CM

## 2021-09-01 DIAGNOSIS — M48.061 SPINAL STENOSIS OF LUMBAR REGION WITHOUT NEUROGENIC CLAUDICATION: ICD-10-CM

## 2021-09-01 DIAGNOSIS — G62.9 NEUROPATHY: ICD-10-CM

## 2021-09-01 RX ORDER — GABAPENTIN 100 MG/1
200 CAPSULE ORAL
Qty: 180 CAPSULE | Refills: 0 | Status: SHIPPED | OUTPATIENT
Start: 2021-09-01 | End: 2021-12-06 | Stop reason: SDUPTHER

## 2021-09-01 NOTE — TELEPHONE ENCOUNTER
Attempted to contact pt about this refill request. He was not able to be reached again. A brief message was left on the home number asking for a return call to the office regarding a refill request. The number to the office was provided.

## 2021-09-01 NOTE — PROGRESS NOTES
I refilled the gabapentin 100 mg -- he may take 1-2 in the evening -- if the dose needs to be adjusted, he needs to let us know -- thanks -- sent to [facility] on [address]

## 2021-09-03 ENCOUNTER — OFFICE VISIT (OUTPATIENT)
Dept: ORTHOPEDIC SURGERY | Age: 66
End: 2021-09-03
Payer: MEDICARE

## 2021-09-03 ENCOUNTER — TELEPHONE (OUTPATIENT)
Dept: ORTHOPEDIC SURGERY | Age: 66
End: 2021-09-03

## 2021-09-03 ENCOUNTER — HOSPITAL ENCOUNTER (OUTPATIENT)
Dept: LAB | Age: 66
Discharge: HOME | End: 2021-09-03
Payer: MEDICARE

## 2021-09-03 VITALS
HEIGHT: 70 IN | WEIGHT: 233 LBS | OXYGEN SATURATION: 98 % | TEMPERATURE: 97.5 F | BODY MASS INDEX: 33.36 KG/M2 | HEART RATE: 73 BPM

## 2021-09-03 DIAGNOSIS — Z96.652 HISTORY OF TOTAL LEFT KNEE REPLACEMENT (TKR): Primary | ICD-10-CM

## 2021-09-03 DIAGNOSIS — M25.562 LEFT KNEE PAIN, UNSPECIFIED CHRONICITY: ICD-10-CM

## 2021-09-03 DIAGNOSIS — M65.9 SYNOVITIS OF LEFT KNEE: ICD-10-CM

## 2021-09-03 DIAGNOSIS — Z96.652 HISTORY OF TOTAL LEFT KNEE REPLACEMENT (TKR): ICD-10-CM

## 2021-09-03 LAB
BASOPHILS # BLD: 0 K/UL (ref 0–0.1)
BASOPHILS NFR BLD: 1 % (ref 0–2)
CRP SERPL-MCNC: 1.6 MG/DL (ref 0–0.3)
DIFFERENTIAL METHOD BLD: ABNORMAL
EOSINOPHIL # BLD: 0.1 K/UL (ref 0–0.4)
EOSINOPHIL NFR BLD: 2 % (ref 0–5)
ERYTHROCYTE [DISTWIDTH] IN BLOOD BY AUTOMATED COUNT: 12.8 % (ref 11.6–14.5)
ERYTHROCYTE [SEDIMENTATION RATE] IN BLOOD: 17 MM/HR (ref 0–20)
HCT VFR BLD AUTO: 39.3 % (ref 36–48)
HGB BLD-MCNC: 13.5 G/DL (ref 13–16)
INR PPP: 1.8 (ref 0.8–1.2)
LYMPHOCYTES # BLD: 1.3 K/UL (ref 0.9–3.6)
LYMPHOCYTES NFR BLD: 22 % (ref 21–52)
MCH RBC QN AUTO: 32.7 PG (ref 24–34)
MCHC RBC AUTO-ENTMCNC: 34.4 G/DL (ref 31–37)
MCV RBC AUTO: 95.2 FL (ref 78–100)
MONOCYTES # BLD: 0.5 K/UL (ref 0.05–1.2)
MONOCYTES NFR BLD: 10 % (ref 3–10)
NEUTS SEG # BLD: 3.7 K/UL (ref 1.8–8)
NEUTS SEG NFR BLD: 65 % (ref 40–73)
PLATELET # BLD AUTO: 180 K/UL (ref 135–420)
PMV BLD AUTO: 11 FL (ref 9.2–11.8)
PROTHROMBIN TIME: 20.2 SEC (ref 11.5–15.2)
RBC # BLD AUTO: 4.13 M/UL (ref 4.35–5.65)
URATE SERPL-MCNC: 7 MG/DL (ref 2.6–7.2)
WBC # BLD AUTO: 5.7 K/UL (ref 4.6–13.2)

## 2021-09-03 PROCEDURE — 36415 COLL VENOUS BLD VENIPUNCTURE: CPT

## 2021-09-03 PROCEDURE — G8432 DEP SCR NOT DOC, RNG: HCPCS | Performed by: PHYSICIAN ASSISTANT

## 2021-09-03 PROCEDURE — G8427 DOCREV CUR MEDS BY ELIG CLIN: HCPCS | Performed by: PHYSICIAN ASSISTANT

## 2021-09-03 PROCEDURE — 83520 IMMUNOASSAY QUANT NOS NONAB: CPT

## 2021-09-03 PROCEDURE — 84550 ASSAY OF BLOOD/URIC ACID: CPT

## 2021-09-03 PROCEDURE — 3017F COLORECTAL CA SCREEN DOC REV: CPT | Performed by: PHYSICIAN ASSISTANT

## 2021-09-03 PROCEDURE — 85610 PROTHROMBIN TIME: CPT

## 2021-09-03 PROCEDURE — G8417 CALC BMI ABV UP PARAM F/U: HCPCS | Performed by: PHYSICIAN ASSISTANT

## 2021-09-03 PROCEDURE — 85025 COMPLETE CBC W/AUTO DIFF WBC: CPT

## 2021-09-03 PROCEDURE — 86140 C-REACTIVE PROTEIN: CPT

## 2021-09-03 PROCEDURE — G8756 NO BP MEASURE DOC: HCPCS | Performed by: PHYSICIAN ASSISTANT

## 2021-09-03 PROCEDURE — 99214 OFFICE O/P EST MOD 30 MIN: CPT | Performed by: PHYSICIAN ASSISTANT

## 2021-09-03 PROCEDURE — 1101F PT FALLS ASSESS-DOCD LE1/YR: CPT | Performed by: PHYSICIAN ASSISTANT

## 2021-09-03 PROCEDURE — 85652 RBC SED RATE AUTOMATED: CPT

## 2021-09-03 PROCEDURE — G8536 NO DOC ELDER MAL SCRN: HCPCS | Performed by: PHYSICIAN ASSISTANT

## 2021-09-03 NOTE — PROGRESS NOTES
[address] [facility], [address] [facility]  [phone]           Patient: Parisa Delgado                MRN: [id number]       SSN: [id number]  YOB: 1955        AGE: 74 y.o. SEX: male  Body mass index is 33.43 kg/m². PCP: Demetrius Vo MD  09/03/21      This office note has been dictated. REVIEW OF SYSTEMS:  Constitutional: Negative for fever, chills, weight loss and malaise/fatigue. HENT: Negative. Eyes: Negative. Respiratory: Negative. Cardiovascular: Negative. Gastrointestinal: No bowel incontinence or constipation. Genitourinary: No bladder incontinence or saddle anesthesia. Skin: Negative. Neurological: Negative. Endo/Heme/Allergies: Negative. Psychiatric/Behavioral: Negative. Musculoskeletal: As per HPI above. Past Medical History:   Diagnosis Date    Arthritis     Bleeding     Chronic pain     knee and shoulder    GERD (gastroesophageal reflux disease)     Headache(784.0)     migraine    High cholesterol     Hypertension     Lower back pain 11/6/2010    Other chest pain     Personal history of prostate cancer     Pure hypercholesterolemia     Right buttock pain 11/6/2010    Right foot pain     Rotator cuff tear     left-since 2010, worsened tear Jan.    Rotator cuff tear, right     Spinal stenosis     Tendonitis, tibialis     anterior    Thromboembolus ([facility] [address]     3 after sx last one 2000         Current Outpatient Medications:     gabapentin (NEURONTIN) 100 mg capsule, Take 2 Capsules by mouth nightly. Max Daily Amount: 200 mg., Disp: 180 Capsule, Rfl: 0    metaxalone (Skelaxin) 800 mg tablet, Take 1 tab by mouth BID-TID prn muscle spasm, Disp: 60 Tablet, Rfl: 2    diclofenac (VOLTAREN) 1 % gel, Apply 4 g to affected area four (4) times daily. , Disp: 500 g, Rfl: 3    L gasseri/B bifidum/B longum (Emotive PO), Take  by mouth nightly., Disp: , Rfl:    lansoprazole (PREVACID) 30 mg capsule, Take 30 mg by mouth daily. , Disp: , Rfl:     warfarin (COUMADIN) 5 mg tablet, TAKE 2 AND 1/2 TABLETS BY MOUTH DAILY OR AS DIRECTED (Patient taking differently: Take  by mouth daily. TAKE 2 AND 1/2 TABLETS BY MOUTH TUES, THURS, SAT or as directed), Disp: 75 Tab, Rfl: 0    lisinopril-hydroCHLOROthiazide (PRINZIDE, ZESTORETIC) 20-12.5 mg per tablet, TAKE 1 TABLET BY MOUTH DAILY (Patient taking differently: Take 1 Tab by mouth daily. TAKE 1 TABLET BY MOUTH DAILY), Disp: 90 Tab, Rfl: 1    labetalol (NORMODYNE) 100 mg tablet, TAKE 1 TABLET BY MOUTH TWICE DAILY. STOP VERAPAMIL (Patient taking differently: Take  by mouth two (2) times a day.), Disp: 180 Tab, Rfl: 0    butalbital-acetaminophen-caff (FIORICET) -40 mg per capsule, 2 cap three times per day as needed for headache (Patient taking differently: Take  by mouth daily. 2 cap three times per day as needed for headache), Disp: 180 Cap, Rfl: 1    ALPRAZolam (XANAX) 0.5 mg tablet, Take one half(1/2) tab to one(1) tab by mouth at bedtime as needed for sleep (Patient taking differently: Take  by mouth nightly as needed. Take one half(1/2) tab to one(1) tab by mouth at bedtime as needed for sleep), Disp: 30 Tab, Rfl: 0    acetaminophen (TYLENOL) 500 mg tablet, Take 1,000 mg by mouth every six (6) hours as needed for Pain., Disp: , Rfl:     methylPREDNISolone (MEDROL DOSEPACK) 4 mg tablet, Per dose pack instructions (Patient not taking: Reported on 6/2/2021), Disp: 1 Dose Pack, Rfl: 0    clindamycin (CLEOCIN) 300 mg capsule, Take 2 capsules po one hour prior to appt (Patient not taking: Reported on 6/2/2021), Disp: 2 Cap, Rfl: 3    celecoxib (CELEBREX) 200 mg capsule, Take 1 Cap by mouth daily.  (Patient not taking: Reported on 7/7/2021), Disp: 90 Cap, Rfl: 2    montelukast (SINGULAIR) 10 mg tablet, TAKE 1 TABLET BY MOUTH EVERY DAY (Patient not taking: Reported on 7/7/2021), Disp: 90 Tab, Rfl: 0    Allergies   Allergen Reactions    Amoxicillin Itching    Augmentin [Amoxicillin-Pot Clavulanate] Itching    Chlorhexidine Towelette Itching    Hibiclens [Chlorhexidine Gluconate] Itching    Milk Containing Products Diarrhea    Nsaids (Non-Steroidal Anti-Inflammatory Drug) Other (comments)     On blood thinner, contraindicated.  Penicillins Rash    Requip [Ropinirole] Nausea and Vomiting       Social History     Socioeconomic History    Marital status:      Spouse name: Not on file    Number of children: Not on file    Years of education: Not on file    Highest education level: Not on file   Occupational History    Not on file   Tobacco Use    Smoking status: Never Smoker    Smokeless tobacco: Never Used   Substance and Sexual Activity    Alcohol use: No    Drug use: No    Sexual activity: Not Currently   Other Topics Concern     Service Not Asked    Blood Transfusions Not Asked    Caffeine Concern Not Asked    Occupational Exposure Not Asked    Hobby Hazards Not Asked    Sleep Concern Not Asked    Stress Concern Not Asked    Weight Concern Not Asked    Special Diet Not Asked    Back Care Not Asked    Exercise Not Asked    Bike Helmet Not Asked   [address] Not Asked    Self-Exams Not Asked   Social History Narrative    Not on file     Social Determinants of Health     Financial Resource Strain:     Difficulty of Paying Living Expenses:    Food Insecurity:     Worried About Running Out of Food in the Last Year:     Ran Out of Food in the Last Year:    Transportation Needs:     Lack of Transportation (Medical):      Lack of Transportation (Non-Medical):    Physical Activity:     Days of Exercise per Week:     Minutes of Exercise per Session:    Stress:     Feeling of Stress :    Social Connections:     Frequency of Communication with Friends and Family:     Frequency of Social Gatherings with Friends and Family:     Attends Nondenominational Services:     Active Member of Clubs or Organizations:     Attends Club or Organization Meetings:     Marital Status:    Intimate Partner Violence:     Fear of Current or Ex-Partner:     Emotionally Abused:     Physically Abused:     Sexually Abused:        Past Surgical History:   Procedure Laterality Date    FOOT/TOES SURGERY PROC UNLISTED      HX BACK SURGERY      HX HEENT Right 09/2018    eye surgery, macular     HX KNEE REPLACEMENT Left     HX ORTHOPAEDIC  06-25-12    Right foot with excision of bursa and adipose tissue from fifth metatarsal base by Dr. Nova Garcias      lower back (1992 and 2000)    HX OTHER SURGICAL      left foot (2008)    HX OTHER SURGICAL      Retina repair     HX PROSTATECTOMY  11/2018    HX ROTATOR CUFF REPAIR Right 01/28/2019    by Dr. Rinku Hunt LAMINOTOMY           Patient seen evaluate today for his left total knee replacement. Does have known failing knee replacement scheduled for surgery in November. He has a failing femoral component. He has had a little increased knee discomfort over the past couple days. He denies any increasing starting pain. He noticed a little bruise to the inside part of the knee. He reports that his most recent INR was 8.0. His Coumadin is been on hold since Tuesday. He denies fevers or chills. No night sweats. Patient denies recent fevers, chills, chest pain, SOB, or injuries. No recent systemic changes noted. A 12-point review of systems is performed today. Pertinent positives are noted. All other systems reviewed and otherwise are negative. Physical exam: General: Alert and oriented x3, nad.  well-developed, well nourished. normal affect, AF. NC/AT, EOMI, neck supple, trachea midline, no JVD present. Breathing is non-labored. Examination of lower extremities was pain-free range of motion the hips. There is no pain to palpation the trochanter bursa. Negative straight leg raise. Negative calf tenderness. Negative Homans. No signs of DVT present. Left knee reveals skin intact. There is no erythema. Does have some what appears to be ecchymosis to the mid part of the knee to the medial side. He has a mild effusion. Range of motion is well-preserved. Neurovascular status intact. Assessment: Status post left knee replacement, failing, elevated INR with likely mild hematoma    Plan: At this point, we discussed treatment options. Given his elevated INR of 8.0 recently I will hold off on any aspiration. We will repeat infectious labs including CBC, ESR, CRP, IL-6, and uric acid. We also recheck his PT/INR.   He is instructed to follow-up with us next Thursday for reevaluation and possible aspiration if necessary              Kristopher Maldonado PA-C, ATC

## 2021-09-03 NOTE — TELEPHONE ENCOUNTER
Patient's wife Hitesh Christian called stating the patient is having a lot of pain in his knee and he's concerned that he might have an infection. She says there's a \"red patch\" on his kneecap that's about 3 inches big and he's wanting someone to possibly see him today. Please advise patient or Hitesh Jhaverimiriam back regarding this at [phone].

## 2021-09-03 NOTE — TELEPHONE ENCOUNTER
I called and spoke to . Lian Chiang. She was made aware of the approved prescription for the gabapentin. She verbalized understanding and is aware that the medication was sent to the pharmacy.

## 2021-09-08 ENCOUNTER — OFFICE VISIT (OUTPATIENT)
Dept: ORTHOPEDIC SURGERY | Age: 66
End: 2021-09-08
Payer: OTHER MISCELLANEOUS

## 2021-09-08 VITALS
HEIGHT: 70 IN | TEMPERATURE: 98.2 F | HEART RATE: 85 BPM | BODY MASS INDEX: 33.64 KG/M2 | OXYGEN SATURATION: 99 % | WEIGHT: 235 LBS

## 2021-09-08 DIAGNOSIS — S46.212D TRAUMATIC PARTIAL TEAR OF LEFT BICEPS TENDON, SUBSEQUENT ENCOUNTER: Primary | ICD-10-CM

## 2021-09-08 DIAGNOSIS — S46.112D LABRAL TEAR OF LONG HEAD OF LEFT BICEPS TENDON, SUBSEQUENT ENCOUNTER: ICD-10-CM

## 2021-09-08 LAB — IL6 SERPL-MCNC: <2.5 PG/ML (ref 0–13)

## 2021-09-08 PROCEDURE — 99213 OFFICE O/P EST LOW 20 MIN: CPT | Performed by: ORTHOPAEDIC SURGERY

## 2021-09-08 NOTE — LETTER
NOTIFICATION RETURN TO WORK / SCHOOL    9/8/2021 1:39 PM    Mr. Savita Barrow  [address] [address]      To Whom It May Concern:    Savita Barrow is currently under the care of [address] [place] [address]. He is to continue light duty restrictions for her left shoulder/biceps injury. These restrictions are in place until the end of 2021. No lifting overhead or greater than 5 pound at any time. If there are questions or concerns please have the patient contact our office.         Sincerely,      Leonard Hughes MD

## 2021-09-08 NOTE — PROGRESS NOTES
Patient: Washington Whitley                MRN: [id number]       SSN: [id number]  YOB: 1955        AGE: 74 y.o. SEX: male  Body mass index is 33.72 kg/m². PCP: Santino Mendenhall MD  09/08/21    Chief Complaint: Left biceps injury/weakness/pain    HPI: Washington Whitley is a 74 y.o. male patient who is now about 9 months out from his left shoulder arthroscopic rotator cuff repair for a massive rotator cuff tear. He also had a biceps long head tendon tear at the time. This was not treated surgically as the tendon was retracted. Overall shoulder is doing well but he continues to complain of weakness and pain with biceps strength testing especially with lifting anything greater than about 10 pounds. Past Medical History:   Diagnosis Date    Arthritis     Bleeding     Chronic pain     knee and shoulder    GERD (gastroesophageal reflux disease)     Headache(784.0)     migraine    High cholesterol     Hypertension     Lower back pain 11/6/2010    Other chest pain     Personal history of prostate cancer     Pure hypercholesterolemia     Right buttock pain 11/6/2010    Right foot pain     Rotator cuff tear     left-since 2010, worsened tear Jan.    Rotator cuff tear, right     Spinal stenosis     Tendonitis, tibialis     anterior    Thromboembolus ([address]     3 after sx last one 2000       Family History   Problem Relation Age of Onset   [facility] Arthritis-rheumatoid Mother     Dementia Mother     Heart Disease Father     Cancer Father     Hypertension Other     Cancer Brother        Current Outpatient Medications   Medication Sig Dispense Refill    gabapentin (NEURONTIN) 100 mg capsule Take 2 Capsules by mouth nightly. Max Daily Amount: 200 mg. 180 Capsule 0    metaxalone (Skelaxin) 800 mg tablet Take 1 tab by mouth BID-TID prn muscle spasm 60 Tablet 2    diclofenac (VOLTAREN) 1 % gel Apply 4 g to affected area four (4) times daily.  500 g 3    L gasseri/B bifidum/B longum ([facility] [address]) Take  by mouth nightly.  lansoprazole (PREVACID) 30 mg capsule Take 30 mg by mouth daily.  warfarin (COUMADIN) 5 mg tablet TAKE 2 AND 1/2 TABLETS BY MOUTH DAILY OR AS DIRECTED (Patient taking differently: Take  by mouth daily. TAKE 2 AND 1/2 TABLETS BY MOUTH TUES, THURS, SAT or as directed) 75 Tab 0    lisinopril-hydroCHLOROthiazide (PRINZIDE, ZESTORETIC) 20-12.5 mg per tablet TAKE 1 TABLET BY MOUTH DAILY (Patient taking differently: Take 1 Tab by mouth daily. TAKE 1 TABLET BY MOUTH DAILY) 90 Tab 1    labetalol (NORMODYNE) 100 mg tablet TAKE 1 TABLET BY MOUTH TWICE DAILY. STOP VERAPAMIL (Patient taking differently: Take  by mouth two (2) times a day.) 180 Tab 0    butalbital-acetaminophen-caff (FIORICET) -40 mg per capsule 2 cap three times per day as needed for headache (Patient taking differently: Take  by mouth daily. 2 cap three times per day as needed for headache) 180 Cap 1    ALPRAZolam (XANAX) 0.5 mg tablet Take one half(1/2) tab to one(1) tab by mouth at bedtime as needed for sleep (Patient taking differently: Take  by mouth nightly as needed. Take one half(1/2) tab to one(1) tab by mouth at bedtime as needed for sleep) 30 Tab 0    acetaminophen (TYLENOL) 500 mg tablet Take 1,000 mg by mouth every six (6) hours as needed for Pain.  methylPREDNISolone (MEDROL DOSEPACK) 4 mg tablet Per dose pack instructions (Patient not taking: Reported on 6/2/2021) 1 Dose Pack 0    clindamycin (CLEOCIN) 300 mg capsule Take 2 capsules po one hour prior to appt (Patient not taking: Reported on 6/2/2021) 2 Cap 3    celecoxib (CELEBREX) 200 mg capsule Take 1 Cap by mouth daily.  (Patient not taking: Reported on 7/7/2021) 90 Cap 2    montelukast (SINGULAIR) 10 mg tablet TAKE 1 TABLET BY MOUTH EVERY DAY (Patient not taking: Reported on 7/7/2021) 90 Tab 0       Allergies   Allergen Reactions    Amoxicillin Itching    Augmentin [Amoxicillin-Pot Clavulanate] Itching  Chlorhexidine Towelette Itching    Hibiclens [Chlorhexidine Gluconate] Itching    Milk Containing Products Diarrhea    Nsaids (Non-Steroidal Anti-Inflammatory Drug) Other (comments)     On blood thinner, contraindicated.      Penicillins Rash    Requip [Ropinirole] Nausea and Vomiting       Past Surgical History:   Procedure Laterality Date    FOOT/TOES SURGERY PROC UNLISTED      HX BACK SURGERY      HX HEENT Right 09/2018    eye surgery, macular     HX KNEE REPLACEMENT Left     HX ORTHOPAEDIC  06-25-12    Right foot with excision of bursa and adipose tissue from fifth metatarsal base by Dr. Jolie Magana      lower back (1992 and 2000)    HX OTHER SURGICAL      left foot (2008)    HX OTHER SURGICAL      Retina repair     HX PROSTATECTOMY  11/2018    HX ROTATOR CUFF REPAIR Right 01/28/2019    by Dr. Alfreda Bee LAMINOTOMY         Social History     Socioeconomic History    Marital status:      Spouse name: Not on file    Number of children: Not on file    Years of education: Not on file    Highest education level: Not on file   Occupational History    Not on file   Tobacco Use    Smoking status: Never Smoker    Smokeless tobacco: Never Used   Substance and Sexual Activity    Alcohol use: No    Drug use: No    Sexual activity: Not Currently   Other Topics Concern     Service Not Asked    Blood Transfusions Not Asked    Caffeine Concern Not Asked    Occupational Exposure Not Asked    Hobby Hazards Not Asked    Sleep Concern Not Asked    Stress Concern Not Asked    Weight Concern Not Asked    Special Diet Not Asked    Back Care Not Asked    Exercise Not Asked    Bike Helmet Not Asked   [address] Not Asked    Self-Exams Not Asked   Social History Narrative    Not on file     Social Determinants of Health     Financial Resource Strain:     Difficulty of Paying Living Expenses:    Food Insecurity:     Worried About Running Out of Food in the Last Year:     [address] in the Last Year:    Transportation Needs:     Lack of Transportation (Medical):  Lack of Transportation (Non-Medical):    Physical Activity:     Days of Exercise per Week:     Minutes of Exercise per Session:    Stress:     Feeling of Stress :    Social Connections:     Frequency of Communication with Friends and Family:     Frequency of Social Gatherings with Friends and Family:     Attends [employer] Services:     Active Member of Clubs or Organizations:     Attends Club or Organization Meetings:     Marital Status:    Intimate Partner Violence:     Fear of Current or Ex-Partner:     Emotionally Abused:     Physically Abused:     Sexually Abused:        REVIEW OF SYSTEMS:      No changes from previous review of systems unless noted. PHYSICAL EXAMINATION:  Visit Vitals  Pulse 85   Temp 98.2 °F (36.8 °C) (Temporal)   Ht 5' 10\" (1.778 m)   Wt 235 lb (106.6 kg)   SpO2 99%   BMI 33.72 kg/m²     Body mass index is 33.72 kg/m². GENERAL: Alert and oriented x3, in no acute distress. HEENT: Normocephalic, atraumatic. RESP: Non labored breathing. SKIN: No rashes or lesions noted.    Shoulder Examination     R   L  ROM   FF  Full   Full  ER  Full   Full   IR  Full   Full  Rotator Cuff Pain   Supra  -   -   Infra  -   -   Subscap -   -  Crepitus  -   -  Effusion  -   -  Warmth  -   -   Erythema  -   -  Instability  -   -  AC Joint TTP  -   -  Clavicle   Deformity -   -   TTP  -   -  Proximal Humerus   Deformity -   -   TTP  -   -  Deltoid Strength 5   5  Biceps Strength 5   5  Biceps Deformity -   +  Biceps Groove Pain -   -  Impingement Sign -   -       IMAGING:  No imaging today    ASSESSMENT & PLAN  Diagnosis: Status post left shoulder massive rotator cuff repair with long head biceps tendon injury    Taylorscotty Paytontramaine is having some long-term sequela of the biceps injury with a long head biceps tendon tear and the fact that he is having some weakness and pain with biceps strength testing. I recommended physical therapy to try to work on this. This may be something that lingers and is permanent. At about 1 year from surgery, so at the end of this year, would likely say he was at maximum medical improvement if he still having the symptoms. I recommend continue with light duty restrictions and if this pain and weakness continues these restrictions may be permanent after a year. I will see him back in 6 weeks. Electronically signed by: Keshav Perez MD    Note: This note was completed using voice recognition software.   Any typographical/name errors or mistakes are unintentional.

## 2021-09-10 ENCOUNTER — OFFICE VISIT (OUTPATIENT)
Dept: ORTHOPEDIC SURGERY | Age: 66
End: 2021-09-10
Payer: MEDICARE

## 2021-09-10 ENCOUNTER — HOSPITAL ENCOUNTER (OUTPATIENT)
Dept: LAB | Age: 66
Discharge: HOME | End: 2021-09-10
Payer: MEDICARE

## 2021-09-10 VITALS
HEART RATE: 81 BPM | HEIGHT: 70 IN | WEIGHT: 235 LBS | TEMPERATURE: 97.7 F | BODY MASS INDEX: 33.64 KG/M2 | OXYGEN SATURATION: 98 %

## 2021-09-10 DIAGNOSIS — M70.52 PES ANSERINUS BURSITIS OF LEFT KNEE: Primary | ICD-10-CM

## 2021-09-10 DIAGNOSIS — M65.9 SYNOVITIS OF LEFT KNEE: ICD-10-CM

## 2021-09-10 DIAGNOSIS — M25.562 LEFT KNEE PAIN, UNSPECIFIED CHRONICITY: ICD-10-CM

## 2021-09-10 DIAGNOSIS — Z96.652 HISTORY OF TOTAL LEFT KNEE REPLACEMENT (TKR): ICD-10-CM

## 2021-09-10 LAB
APPEARANCE FLD: ABNORMAL
COLOR FLD: ABNORMAL
EOSINOPHIL NFR FLD MANUAL: 0 %
LYMPHOCYTES NFR FLD: 80 %
MONOCYTES NFR FLD: 8 %
NEUTROPHILS NFR FLD: 12 %
NEUTS BAND # FLD: 0 %
NUC CELL # FLD: 50 /CU MM
RBC # FLD: 7900 /CU MM
SPECIMEN SOURCE FLD: ABNORMAL
TOTAL CELLS COUNTED SPEC: 25

## 2021-09-10 PROCEDURE — G8417 CALC BMI ABV UP PARAM F/U: HCPCS | Performed by: PHYSICIAN ASSISTANT

## 2021-09-10 PROCEDURE — 87205 SMEAR GRAM STAIN: CPT

## 2021-09-10 PROCEDURE — 89050 BODY FLUID CELL COUNT: CPT

## 2021-09-10 PROCEDURE — G8536 NO DOC ELDER MAL SCRN: HCPCS | Performed by: PHYSICIAN ASSISTANT

## 2021-09-10 PROCEDURE — 3017F COLORECTAL CA SCREEN DOC REV: CPT | Performed by: PHYSICIAN ASSISTANT

## 2021-09-10 PROCEDURE — 20610 DRAIN/INJ JOINT/BURSA W/O US: CPT | Performed by: PHYSICIAN ASSISTANT

## 2021-09-10 PROCEDURE — 1101F PT FALLS ASSESS-DOCD LE1/YR: CPT | Performed by: PHYSICIAN ASSISTANT

## 2021-09-10 PROCEDURE — G8756 NO BP MEASURE DOC: HCPCS | Performed by: PHYSICIAN ASSISTANT

## 2021-09-10 PROCEDURE — G8427 DOCREV CUR MEDS BY ELIG CLIN: HCPCS | Performed by: PHYSICIAN ASSISTANT

## 2021-09-10 PROCEDURE — G8432 DEP SCR NOT DOC, RNG: HCPCS | Performed by: PHYSICIAN ASSISTANT

## 2021-09-10 PROCEDURE — 99214 OFFICE O/P EST MOD 30 MIN: CPT | Performed by: PHYSICIAN ASSISTANT

## 2021-09-10 RX ORDER — BETAMETHASONE SODIUM PHOSPHATE AND BETAMETHASONE ACETATE 3; 3 MG/ML; MG/ML
3 INJECTION, SUSPENSION INTRA-ARTICULAR; INTRALESIONAL; INTRAMUSCULAR; SOFT TISSUE ONCE
Status: CANCELLED | OUTPATIENT
Start: 2021-09-10 | End: 2021-09-11

## 2021-09-10 NOTE — PROGRESS NOTES
[address] [facility], [address] [facility]  [phone]           Patient: Parisa Delgado                MRN: [id number]       SSN: [id number]  YOB: 1955        AGE: 74 y.o. SEX: male  Body mass index is 33.72 kg/m². PCP: Demetrius Vo MD  09/10/21            REVIEW OF SYSTEMS:  Constitutional: Negative for fever, chills, weight loss and malaise/fatigue. HENT: Negative. Eyes: Negative. Respiratory: Negative. Cardiovascular: Negative. Gastrointestinal: No bowel incontinence or constipation. Genitourinary: No bladder incontinence or saddle anesthesia. Skin: Negative. Neurological: Negative. Endo/Heme/Allergies: Negative. Psychiatric/Behavioral: Negative. Musculoskeletal: As per HPI above. Past Medical History:   Diagnosis Date    Arthritis     Bleeding     Chronic pain     knee and shoulder    GERD (gastroesophageal reflux disease)     Headache(784.0)     migraine    High cholesterol     Hypertension     Lower back pain 11/6/2010    Other chest pain     Personal history of prostate cancer     Pure hypercholesterolemia     Right buttock pain 11/6/2010    Right foot pain     Rotator cuff tear     left-since 2010, worsened tear Jan.    Rotator cuff tear, right     Spinal stenosis     Tendonitis, tibialis     anterior    Thromboembolus ([facility] [address]     3 after sx last one 2000         Current Outpatient Medications:     gabapentin (NEURONTIN) 100 mg capsule, Take 2 Capsules by mouth nightly. Max Daily Amount: 200 mg., Disp: 180 Capsule, Rfl: 0    metaxalone (Skelaxin) 800 mg tablet, Take 1 tab by mouth BID-TID prn muscle spasm, Disp: 60 Tablet, Rfl: 2    methylPREDNISolone (MEDROL DOSEPACK) 4 mg tablet, Per dose pack instructions, Disp: 1 Dose Pack, Rfl: 0    diclofenac (VOLTAREN) 1 % gel, Apply 4 g to affected area four (4) times daily. , Disp: 500 g, Rfl: 3    L gasseri/B bifidum/B longum ([facility] PO), Take  by mouth nightly., Disp: , Rfl:     lansoprazole (PREVACID) 30 mg capsule, Take 30 mg by mouth daily. , Disp: , Rfl:     warfarin (COUMADIN) 5 mg tablet, TAKE 2 AND 1/2 TABLETS BY MOUTH DAILY OR AS DIRECTED (Patient taking differently: Take  by mouth daily. TAKE 2 AND 1/2 TABLETS BY MOUTH TUES, THURS, SAT or as directed), Disp: 75 Tab, Rfl: 0    lisinopril-hydroCHLOROthiazide (PRINZIDE, ZESTORETIC) 20-12.5 mg per tablet, TAKE 1 TABLET BY MOUTH DAILY (Patient taking differently: Take 1 Tab by mouth daily. TAKE 1 TABLET BY MOUTH DAILY), Disp: 90 Tab, Rfl: 1    labetalol (NORMODYNE) 100 mg tablet, TAKE 1 TABLET BY MOUTH TWICE DAILY. STOP VERAPAMIL (Patient taking differently: Take  by mouth two (2) times a day.), Disp: 180 Tab, Rfl: 0    butalbital-acetaminophen-caff (FIORICET) -40 mg per capsule, 2 cap three times per day as needed for headache (Patient taking differently: Take  by mouth daily. 2 cap three times per day as needed for headache), Disp: 180 Cap, Rfl: 1    ALPRAZolam (XANAX) 0.5 mg tablet, Take one half(1/2) tab to one(1) tab by mouth at bedtime as needed for sleep (Patient taking differently: Take  by mouth nightly as needed. Take one half(1/2) tab to one(1) tab by mouth at bedtime as needed for sleep), Disp: 30 Tab, Rfl: 0    acetaminophen (TYLENOL) 500 mg tablet, Take 1,000 mg by mouth every six (6) hours as needed for Pain., Disp: , Rfl:     clindamycin (CLEOCIN) 300 mg capsule, Take 2 capsules po one hour prior to appt (Patient not taking: Reported on 6/2/2021), Disp: 2 Cap, Rfl: 3    celecoxib (CELEBREX) 200 mg capsule, Take 1 Cap by mouth daily.  (Patient not taking: Reported on 7/7/2021), Disp: 90 Cap, Rfl: 2    montelukast (SINGULAIR) 10 mg tablet, TAKE 1 TABLET BY MOUTH EVERY DAY (Patient not taking: Reported on 7/7/2021), Disp: 90 Tab, Rfl: 0    Allergies   Allergen Reactions    Amoxicillin Itching    Augmentin [Amoxicillin-Pot Clavulanate] Itching    Chlorhexidine Towelette Itching    Hibiclens [Chlorhexidine Gluconate] Itching    Milk Containing Products Diarrhea    Nsaids (Non-Steroidal Anti-Inflammatory Drug) Other (comments)     On blood thinner, contraindicated.  Penicillins Rash    Requip [Ropinirole] Nausea and Vomiting       Social History     Socioeconomic History    Marital status:      Spouse name: Not on file    Number of children: Not on file    Years of education: Not on file    Highest education level: Not on file   Occupational History    Not on file   Tobacco Use    Smoking status: Never Smoker    Smokeless tobacco: Never Used   Substance and Sexual Activity    Alcohol use: No    Drug use: No    Sexual activity: Not Currently   Other Topics Concern     Service Not Asked    Blood Transfusions Not Asked    Caffeine Concern Not Asked    Occupational Exposure Not Asked    Hobby Hazards Not Asked    Sleep Concern Not Asked    Stress Concern Not Asked    Weight Concern Not Asked    Special Diet Not Asked    Back Care Not Asked    Exercise Not Asked    Bike Helmet Not Asked   [address] Not Asked    Self-Exams Not Asked   Social History Narrative    Not on file     Social Determinants of Health     Financial Resource Strain:     Difficulty of Paying Living Expenses:    Food Insecurity:     Worried About Running Out of Food in the Last Year:     Ran Out of Food in the Last Year:    Transportation Needs:     Lack of Transportation (Medical):      Lack of Transportation (Non-Medical):    Physical Activity:     Days of Exercise per Week:     Minutes of Exercise per Session:    Stress:     Feeling of Stress :    Social Connections:     Frequency of Communication with Friends and Family:     Frequency of Social Gatherings with Friends and Family:     Attends Oriental orthodox Services:     Active Member of Clubs or Organizations:     Attends Club or Organization Meetings:    Alhaji Hare Marital Status:    Intimate Partner Violence:     Fear of Current or Ex-Partner:     Emotionally Abused:     Physically Abused:     Sexually Abused:        Past Surgical History:   Procedure Laterality Date    FOOT/TOES SURGERY PROC UNLISTED      HX BACK SURGERY      HX HEENT Right 09/2018    eye surgery, macular     HX KNEE REPLACEMENT Left     HX ORTHOPAEDIC  06-25-12    Right foot with excision of bursa and adipose tissue from fifth metatarsal base by Dr. Kathy Garcia      lower back (1992 and 2000)    HX OTHER SURGICAL      left foot (2008)    HX OTHER SURGICAL      Retina repair     HX PROSTATECTOMY  11/2018    HX ROTATOR CUFF REPAIR Right 01/28/2019    by Dr. Halina Albrecht LAMINOTOMY         Patient seen evaluate today for his left knee. Does have a known failing left total knee replacement. He recently had his INR jumped up to 8 and had some increased discomfort of his knee. Likely had a bit of a hematoma. He was sent for some infectious labs and presents today for evaluation. He denies any fevers or chills. He denies any increase in discomfort of his knee. Patient denies recent fevers, chills, chest pain, SOB, or injuries. No recent systemic changes noted. A 12-point review of systems is performed today. Pertinent positives are noted. All other systems reviewed and otherwise are negative. Physical exam: General: Alert and oriented x3, nad.  well-developed, well nourished. normal affect, AF. NC/AT, EOMI, neck supple, trachea midline, no JVD present. Breathing is non-labored. Examination of lower extremities reveals pain-free range of motion the hips. There is no pain to palpation trochanter bursa. Neck straight leg raise. Negative calf tenderness. Negative Homans. No signs of DVT present. Left knee reveals skin intact. There is no erythema. Does have some resolving ecchymosis. He has a mild effusion.  Negative patellar ballottement. Some mild generalized tenderness palpation. Range of motion is well-preserved. Review of labs: ESR-17, CRP-1.6, IL-6-less than 2.5, WBC-5.7, INR-1.1    Assessment: Failing left knee replacement with effusion    Plan: At this point, with elevated CRP and failing knee replacement we will move forward with an aspiration of the left knee today. After informed consent, under aseptic conditions, after timeout was performed the left knee was prepped with Betadine and approximately 30 mL of normal-appearing blood-tinged fluid was aspirated without complications. We will send this for stat cell count, Gram stain culture and sensitivity. He is instructed on ice therapy. Chart reviewed for the following:  Angeli KO PA-C, have reviewed the History, Physical and updated the Allergic reactions for Shannan Wall? TIME OUT performed immediately prior to start of procedure:  Angeli KO PA-C, have performed the following reviews on Shannan Wall prior to the start of the procedure:  ????????  * Patient was identified by name and date of birth   * Agreement on procedure being performed was verified  * Risks and Benefits explained to the patient  * Procedure site verified and marked as necessary  * Patient was positioned for comfort  * Consent was signed and verified    Time:2:00 PM    Body part: left knee aspiration    Medication & Dose: none    Date of procedure: 09/10/21    Procedure performed by: Angeli Dodd PA-C    Provider assisted by: none    Patient assisted by: self    How tolerated by patient: tolerated the procedure well with no complications    Post Procedural Pain Scale: 4    Comments:   30ml normal appearing blood tinged fluid was aspirated.     JR Willie GATES PA-C, ATC

## 2021-09-15 LAB
BACTERIA SPEC CULT: NORMAL
BACTERIA SPEC CULT: NORMAL
GRAM STN SPEC: NORMAL
GRAM STN SPEC: NORMAL
SERVICE CMNT-IMP: NORMAL

## 2021-10-11 ENCOUNTER — OFFICE VISIT (OUTPATIENT)
Dept: ORTHOPEDIC SURGERY | Age: 66
End: 2021-10-11
Payer: MEDICARE

## 2021-10-11 VITALS
RESPIRATION RATE: 16 BRPM | WEIGHT: 233 LBS | TEMPERATURE: 97.5 F | HEIGHT: 70 IN | HEART RATE: 65 BPM | OXYGEN SATURATION: 100 % | BODY MASS INDEX: 33.36 KG/M2

## 2021-10-11 DIAGNOSIS — M65.9 SYNOVITIS OF LEFT KNEE: ICD-10-CM

## 2021-10-11 DIAGNOSIS — M25.561 RIGHT KNEE PAIN, UNSPECIFIED CHRONICITY: ICD-10-CM

## 2021-10-11 DIAGNOSIS — Z96.652 HISTORY OF TOTAL LEFT KNEE REPLACEMENT (TKR): ICD-10-CM

## 2021-10-11 DIAGNOSIS — M25.562 LEFT KNEE PAIN, UNSPECIFIED CHRONICITY: ICD-10-CM

## 2021-10-11 DIAGNOSIS — M70.52 PES ANSERINUS BURSITIS OF LEFT KNEE: Primary | ICD-10-CM

## 2021-10-11 PROCEDURE — G8417 CALC BMI ABV UP PARAM F/U: HCPCS | Performed by: PHYSICIAN ASSISTANT

## 2021-10-11 PROCEDURE — G8756 NO BP MEASURE DOC: HCPCS | Performed by: PHYSICIAN ASSISTANT

## 2021-10-11 PROCEDURE — G8536 NO DOC ELDER MAL SCRN: HCPCS | Performed by: PHYSICIAN ASSISTANT

## 2021-10-11 PROCEDURE — 3017F COLORECTAL CA SCREEN DOC REV: CPT | Performed by: PHYSICIAN ASSISTANT

## 2021-10-11 PROCEDURE — 1101F PT FALLS ASSESS-DOCD LE1/YR: CPT | Performed by: PHYSICIAN ASSISTANT

## 2021-10-11 PROCEDURE — 99213 OFFICE O/P EST LOW 20 MIN: CPT | Performed by: PHYSICIAN ASSISTANT

## 2021-10-11 PROCEDURE — G8427 DOCREV CUR MEDS BY ELIG CLIN: HCPCS | Performed by: PHYSICIAN ASSISTANT

## 2021-10-11 PROCEDURE — G8432 DEP SCR NOT DOC, RNG: HCPCS | Performed by: PHYSICIAN ASSISTANT

## 2021-10-11 NOTE — PROGRESS NOTES
[address] [facility], [address] [facility]  [phone]           Patient: Ivan Villafuerte                MRN: [id number]       SSN: [id number]  YOB: 1955        AGE: 74 y.o. SEX: male  Body mass index is 33.43 kg/m². PCP: Taya Akins MD  10/11/21      This office note has been dictated. REVIEW OF SYSTEMS:  Constitutional: Negative for fever, chills, weight loss and malaise/fatigue. HENT: Negative. Eyes: Negative. Respiratory: Negative. Cardiovascular: Negative. Gastrointestinal: No bowel incontinence or constipation. Genitourinary: No bladder incontinence or saddle anesthesia. Skin: Negative. Neurological: Negative. Endo/Heme/Allergies: Negative. Psychiatric/Behavioral: Negative. Musculoskeletal: As per HPI above. Past Medical History:   Diagnosis Date    Arthritis     Bleeding     Chronic pain     knee and shoulder    GERD (gastroesophageal reflux disease)     Headache(784.0)     migraine    High cholesterol     Hypertension     Lower back pain 11/6/2010    Other chest pain     Personal history of prostate cancer     Pure hypercholesterolemia     Right buttock pain 11/6/2010    Right foot pain     Rotator cuff tear     left-since 2010, worsened tear Jan.    Rotator cuff tear, right     Spinal stenosis     Tendonitis, tibialis     anterior    Thromboembolus ([facility] [address]     3 after sx last one 2000         Current Outpatient Medications:     gabapentin (NEURONTIN) 100 mg capsule, Take 2 Capsules by mouth nightly. Max Daily Amount: 200 mg., Disp: 180 Capsule, Rfl: 0    metaxalone (Skelaxin) 800 mg tablet, Take 1 tab by mouth BID-TID prn muscle spasm, Disp: 60 Tablet, Rfl: 2    methylPREDNISolone (MEDROL DOSEPACK) 4 mg tablet, Per dose pack instructions, Disp: 1 Dose Pack, Rfl: 0    diclofenac (VOLTAREN) 1 % gel, Apply 4 g to affected area four (4) times daily. , Disp: 500 g, Rfl: 3    L gasseri/B bifidum/B longum ([facility] PO), Take  by mouth nightly., Disp: , Rfl:     lansoprazole (PREVACID) 30 mg capsule, Take 30 mg by mouth daily. , Disp: , Rfl:     warfarin (COUMADIN) 5 mg tablet, TAKE 2 AND 1/2 TABLETS BY MOUTH DAILY OR AS DIRECTED (Patient taking differently: Take  by mouth daily. TAKE 2 AND 1/2 TABLETS BY MOUTH TUES, THURS, SAT or as directed), Disp: 75 Tab, Rfl: 0    lisinopril-hydroCHLOROthiazide (PRINZIDE, ZESTORETIC) 20-12.5 mg per tablet, TAKE 1 TABLET BY MOUTH DAILY (Patient taking differently: Take 1 Tab by mouth daily. TAKE 1 TABLET BY MOUTH DAILY), Disp: 90 Tab, Rfl: 1    labetalol (NORMODYNE) 100 mg tablet, TAKE 1 TABLET BY MOUTH TWICE DAILY. STOP VERAPAMIL (Patient taking differently: Take  by mouth two (2) times a day.), Disp: 180 Tab, Rfl: 0    butalbital-acetaminophen-caff (FIORICET) -40 mg per capsule, 2 cap three times per day as needed for headache (Patient taking differently: Take  by mouth daily. 2 cap three times per day as needed for headache), Disp: 180 Cap, Rfl: 1    ALPRAZolam (XANAX) 0.5 mg tablet, Take one half(1/2) tab to one(1) tab by mouth at bedtime as needed for sleep (Patient taking differently: Take  by mouth nightly as needed. Take one half(1/2) tab to one(1) tab by mouth at bedtime as needed for sleep), Disp: 30 Tab, Rfl: 0    acetaminophen (TYLENOL) 500 mg tablet, Take 1,000 mg by mouth every six (6) hours as needed for Pain., Disp: , Rfl:     clindamycin (CLEOCIN) 300 mg capsule, Take 2 capsules po one hour prior to appt (Patient not taking: Reported on 6/2/2021), Disp: 2 Cap, Rfl: 3    celecoxib (CELEBREX) 200 mg capsule, Take 1 Cap by mouth daily.  (Patient not taking: Reported on 7/7/2021), Disp: 90 Cap, Rfl: 2    montelukast (SINGULAIR) 10 mg tablet, TAKE 1 TABLET BY MOUTH EVERY DAY (Patient not taking: Reported on 7/7/2021), Disp: 90 Tab, Rfl: 0    Allergies   Allergen Reactions    Amoxicillin Itching    Augmentin [Amoxicillin-Pot Clavulanate] Itching    Chlorhexidine Towelette Itching    Hibiclens [Chlorhexidine Gluconate] Itching    Milk Containing Products Diarrhea    Nsaids (Non-Steroidal Anti-Inflammatory Drug) Other (comments)     On blood thinner, contraindicated.  Penicillins Rash    Requip [Ropinirole] Nausea and Vomiting       Social History     Socioeconomic History    Marital status:      Spouse name: Not on file    Number of children: Not on file    Years of education: Not on file    Highest education level: Not on file   Occupational History    Not on file   Tobacco Use    Smoking status: Never Smoker    Smokeless tobacco: Never Used   Substance and Sexual Activity    Alcohol use: No    Drug use: No    Sexual activity: Not Currently   Other Topics Concern     Service Not Asked    Blood Transfusions Not Asked    Caffeine Concern Not Asked    Occupational Exposure Not Asked    Hobby Hazards Not Asked    Sleep Concern Not Asked    Stress Concern Not Asked    Weight Concern Not Asked    Special Diet Not Asked    Back Care Not Asked    Exercise Not Asked    Bike Helmet Not Asked   [address] Not Asked    Self-Exams Not Asked   Social History Narrative    Not on file     Social Determinants of Health     Financial Resource Strain:     Difficulty of Paying Living Expenses:    Food Insecurity:     Worried About Running Out of Food in the Last Year:     Ran Out of Food in the Last Year:    Transportation Needs:     Lack of Transportation (Medical):      Lack of Transportation (Non-Medical):    Physical Activity:     Days of Exercise per Week:     Minutes of Exercise per Session:    Stress:     Feeling of Stress :    Social Connections:     Frequency of Communication with Friends and Family:     Frequency of Social Gatherings with Friends and Family:     Attends Christian Services:     Active Member of Clubs or Organizations:     Attends Club or Organization Meetings:     Marital Status:    Intimate Partner Violence:     Fear of Current or Ex-Partner:     Emotionally Abused:     Physically Abused:     Sexually Abused:        Past Surgical History:   Procedure Laterality Date    FOOT/TOES SURGERY PROC UNLISTED      HX BACK SURGERY      HX HEENT Right 09/2018    eye surgery, macular     HX KNEE REPLACEMENT Left     HX ORTHOPAEDIC  06-25-12    Right foot with excision of bursa and adipose tissue from fifth metatarsal base by Dr. Daly Medrano      lower back (1992 and 2000)    HX OTHER SURGICAL      left foot (2008)    HX OTHER SURGICAL      Retina repair     HX PROSTATECTOMY  11/2018    HX ROTATOR CUFF REPAIR Right 01/28/2019    by Dr. Ashutosh Lucio LAMINOTOMY             Patient seen and evaluated today for his left knee. 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 He has been worked up for infection with an aspiration which was normal.  He did have increased swelling of his knee in the past with an elevated INR. He states his current INR is 1.9. He reports some start of discomfort and instability in his knee. He is scheduled for revision surgery in a month. Patient denies recent fevers, chills, chest pain, SOB, or injuries. No recent systemic changes noted. A 12-point review of systems is performed today. Pertinent positives are noted. All other systems reviewed and otherwise are negative. Physical exam: General: Alert and oriented x3, nad.  well-developed, well nourished. normal affect, AF. NC/AT, EOMI, neck supple, trachea midline, no JVD present. Breathing is non-labored. Examination of the lower extremities with pain-free range was the hips. There is no pain to palpation trochanter bursa. Neck straight leg raise. Negative calf tenderness. Negative Homans. No signs of DVT present. The left knee reveals skin intact. There is no erythema, ecchymosis or warmth noted. There are no signs of infection or cellulitis present. He does have well-maintained range of motion. Patella tracks nicely without rubs or cups noted. Mild laxity in extension mid flexion to the lateral side. Review of previous aspiration: WBC-50, no growth    Plan: At this point, he will continue with ice therapy. He will continue with activities as tolerated. Continue with Tylenol for pain. We will see him back in the office for a preoperative history and physical.  He will call with any questions or concerns arise.             JR Willie GATES, PA-C, ATC

## 2021-10-11 NOTE — LETTER
NOTIFICATION    10/11/2021 10:47 AM    Mr. Emily Fernández  [address] [address]      To Whom It May Concern:    Emily Fernández is currently under the care of [address] [place] [address]. Pastor Polo is unable to attend jury duty due to being the primary caregiver of her  who is undergoing revision knee replacement surgery. Fax: [phone]  Attention: [facility]    If there are questions or concerns please have the patient contact our office.         Sincerely,      Helena Cheadle, PA-C

## 2021-10-15 DIAGNOSIS — Z01.818 PREOPERATIVE TESTING: Primary | ICD-10-CM

## 2021-10-15 DIAGNOSIS — T84.093A FAILED TOTAL LEFT KNEE REPLACEMENT, INITIAL ENCOUNTER (HCC): ICD-10-CM

## 2021-10-20 ENCOUNTER — OFFICE VISIT (OUTPATIENT)
Dept: ORTHOPEDIC SURGERY | Age: 66
End: 2021-10-20
Payer: OTHER MISCELLANEOUS

## 2021-10-20 VITALS
TEMPERATURE: 97.5 F | BODY MASS INDEX: 33.5 KG/M2 | HEART RATE: 86 BPM | HEIGHT: 70 IN | OXYGEN SATURATION: 98 % | WEIGHT: 234 LBS

## 2021-10-20 DIAGNOSIS — S46.012D TRAUMATIC COMPLETE TEAR OF LEFT ROTATOR CUFF, SUBSEQUENT ENCOUNTER: Primary | ICD-10-CM

## 2021-10-20 PROCEDURE — 99213 OFFICE O/P EST LOW 20 MIN: CPT | Performed by: ORTHOPAEDIC SURGERY

## 2021-10-20 NOTE — PROGRESS NOTES
Patient: Mendel Bunde                MRN: [id number]       SSN: [id number]  YOB: 1955        AGE: 74 y.o. SEX: male  Body mass index is 33.58 kg/m². PCP: Lian Valera MD  10/20/21    Chief Complaint: Left shoulder follow up     Pain 1/10    HPI: Mendel Bunde is a 74 y.o. male patient who returns to the office today for his left shoulder. Is been working in physical therapy on some biceps pain and is improved. He does report about 2 and half weeks without much biceps pain which is better than its been. Past Medical History:   Diagnosis Date    Arthritis     Bleeding     Chronic pain     knee and shoulder    GERD (gastroesophageal reflux disease)     Headache(784.0)     migraine    High cholesterol     Hypertension     Lower back pain 11/6/2010    Other chest pain     Personal history of prostate cancer     Pure hypercholesterolemia     Right buttock pain 11/6/2010    Right foot pain     Rotator cuff tear     left-since 2010, worsened tear Jan.    Rotator cuff tear, right     Spinal stenosis     Tendonitis, tibialis     anterior    Thromboembolus ([address]     3 after sx last one 2000       Family History   Problem Relation Age of Onset   [facility] Arthritis-rheumatoid Mother     Dementia Mother     Heart Disease Father     Cancer Father     Hypertension Other     Cancer Brother        Current Outpatient Medications   Medication Sig Dispense Refill    gabapentin (NEURONTIN) 100 mg capsule Take 2 Capsules by mouth nightly. Max Daily Amount: 200 mg. 180 Capsule 0    metaxalone (Skelaxin) 800 mg tablet Take 1 tab by mouth BID-TID prn muscle spasm 60 Tablet 2    methylPREDNISolone (MEDROL DOSEPACK) 4 mg tablet Per dose pack instructions (Patient not taking: Reported on 10/19/2021) 1 Dose Pack 0    clindamycin (CLEOCIN) 300 mg capsule Take 2 capsules po one hour prior to appt 2 Cap 3    diclofenac (VOLTAREN) 1 % gel Apply 4 g to affected area four (4) times daily. 500 g 3    celecoxib (CELEBREX) 200 mg capsule Take 1 Cap by mouth daily. 90 Cap 2    L gasseri/B bifidum/B longum ([facility] [address]) Take  by mouth nightly.  lansoprazole (PREVACID) 30 mg capsule Take 30 mg by mouth daily.  warfarin (COUMADIN) 5 mg tablet TAKE 2 AND 1/2 TABLETS BY MOUTH DAILY OR AS DIRECTED (Patient taking differently: Take  by mouth daily. TAKE 2 AND 1/2 TABLETS BY MOUTH TUES, THURS, SAT or as directed) 75 Tab 0    lisinopril-hydroCHLOROthiazide (PRINZIDE, ZESTORETIC) 20-12.5 mg per tablet TAKE 1 TABLET BY MOUTH DAILY (Patient taking differently: Take 1 Tab by mouth daily. TAKE 1 TABLET BY MOUTH DAILY) 90 Tab 1    labetalol (NORMODYNE) 100 mg tablet TAKE 1 TABLET BY MOUTH TWICE DAILY. STOP VERAPAMIL (Patient taking differently: Take  by mouth two (2) times a day.) 180 Tab 0    butalbital-acetaminophen-caff (FIORICET) -40 mg per capsule 2 cap three times per day as needed for headache (Patient taking differently: Take  by mouth daily. 2 cap three times per day as needed for headache) 180 Cap 1    ALPRAZolam (XANAX) 0.5 mg tablet Take one half(1/2) tab to one(1) tab by mouth at bedtime as needed for sleep (Patient taking differently: Take  by mouth nightly as needed. Take one half(1/2) tab to one(1) tab by mouth at bedtime as needed for sleep) 30 Tab 0    montelukast (SINGULAIR) 10 mg tablet TAKE 1 TABLET BY MOUTH EVERY DAY 90 Tab 0    acetaminophen (TYLENOL) 500 mg tablet Take 1,000 mg by mouth every six (6) hours as needed for Pain. Allergies   Allergen Reactions    Amoxicillin Itching    Augmentin [Amoxicillin-Pot Clavulanate] Itching    Chlorhexidine Towelette Itching    Hibiclens [Chlorhexidine Gluconate] Itching    Milk Containing Products Diarrhea    Nsaids (Non-Steroidal Anti-Inflammatory Drug) Other (comments)     On blood thinner, contraindicated.      Penicillins Rash    Requip [Ropinirole] Nausea and Vomiting       Past Surgical History: Procedure Laterality Date    FOOT/TOES SURGERY PROC UNLISTED      HX BACK SURGERY      HX HEENT Right 09/2018    eye surgery, macular     HX KNEE REPLACEMENT Left     HX ORTHOPAEDIC  06-25-12    Right foot with excision of bursa and adipose tissue from fifth metatarsal base by Dr. Albino Pearce      lower back (1992 and 2000)    HX OTHER SURGICAL      left foot (2008)    HX OTHER SURGICAL      Retina repair     HX PROSTATECTOMY  11/2018    HX ROTATOR CUFF REPAIR Right 01/28/2019    by Dr. Miladys Muse LAMINOTOMY         Social History     Socioeconomic History    Marital status:      Spouse name: Not on file    Number of children: Not on file    Years of education: Not on file    Highest education level: Not on file   Occupational History    Not on file   Tobacco Use    Smoking status: Never Smoker    Smokeless tobacco: Never Used   Substance and Sexual Activity    Alcohol use: No    Drug use: No    Sexual activity: Not Currently   Other Topics Concern     Service Not Asked    Blood Transfusions Not Asked    Caffeine Concern Not Asked    Occupational Exposure Not Asked    Hobby Hazards Not Asked    Sleep Concern Not Asked    Stress Concern Not Asked    Weight Concern Not Asked    Special Diet Not Asked    Back Care Not Asked    Exercise Not Asked    Bike Helmet Not Asked   [address] [facility] Not Asked    Self-Exams Not Asked   Social History Narrative    Not on file     Social Determinants of Health     Financial Resource Strain:     Difficulty of Paying Living Expenses:    Food Insecurity:     Worried About Running Out of Food in the Last Year:     Ran Out of Food in the Last Year:    Transportation Needs:     Lack of Transportation (Medical):      Lack of Transportation (Non-Medical):    Physical Activity:     Days of Exercise per Week:     Minutes of Exercise per Session:    Stress:     Feeling of Stress :    Social Connections:     Frequency of Communication with Friends and Family:     Frequency of Social Gatherings with Friends and Family:     Attends Nondenominational Services:     Active Member of Clubs or Organizations:     Attends Club or Organization Meetings:     Marital Status:    Intimate Partner Violence:     Fear of Current or Ex-Partner:     Emotionally Abused:     Physically Abused:     Sexually Abused:        REVIEW OF SYSTEMS:      No changes from previous review of systems unless noted. PHYSICAL EXAMINATION:  Visit Vitals  Pulse 86   Temp 97.5 °F (36.4 °C) (Temporal)   Ht 5' 10\" (1.778 m)   Wt 234 lb (106.1 kg)   SpO2 98%   BMI 33.58 kg/m²     Body mass index is 33.58 kg/m². GENERAL: Alert and oriented x3, in no acute distress. HEENT: Normocephalic, atraumatic. RESP: Non labored breathing. SKIN: No rashes or lesions noted. Shoulder Examination     R   L  ROM   FF  Full   Full  ER  Full   Full   IR  Full   Full  Rotator Cuff Pain   Supra  -   -   Infra  -   -   Subscap -   -  Crepitus  -   -  Effusion  -   -  Warmth  -   -   Erythema  -   -  Instability  -   -  AC Joint TTP  -   -  Clavicle   Deformity -   -   TTP  -   -  Proximal Humerus   Deformity -   -   TTP  -   -  Deltoid Strength 5   5  Biceps Strength 5   5  Biceps Deformity -   -  Biceps Groove Pain -   -  Impingement Sign -   -       IMAGING:  No imaging today    ASSESSMENT & PLAN  Diagnosis: 10 months status post left shoulder surgery    Jonnie Lopez is doing okay still recovering from his left shoulder surgery. He is set to have knee surgery coming up due to some loosening of the knee prosthesis. He can use his left arm without restrictions up to his level of comfort.   I think at this point we will continue to keep him out of work until the end of the year and I will see him back in late December to make a decision on return to work status but likely his knee replacement surgeries when he came out of work longer than the next month and 1/2 to 2 months. Electronically signed by: Frankie Ventura MD    Note: This note was completed using voice recognition software.   Any typographical/name errors or mistakes are unintentional.

## 2021-10-27 ENCOUNTER — HOSPITAL ENCOUNTER (OUTPATIENT)
Dept: GENERAL RADIOLOGY | Age: 66
Discharge: HOME | End: 2021-10-27
Payer: MEDICARE

## 2021-10-27 ENCOUNTER — HOSPITAL ENCOUNTER (OUTPATIENT)
Dept: PREADMISSION TESTING | Age: 66
Discharge: HOME | End: 2021-10-27
Payer: MEDICARE

## 2021-10-27 DIAGNOSIS — Z01.818 PREOPERATIVE TESTING: ICD-10-CM

## 2021-10-27 DIAGNOSIS — T84.093A FAILED TOTAL LEFT KNEE REPLACEMENT, INITIAL ENCOUNTER (HCC): ICD-10-CM

## 2021-10-27 LAB
ABO + RH BLD: NORMAL
ALBUMIN SERPL-MCNC: 3.7 G/DL (ref 3.4–5)
ANION GAP SERPL CALC-SCNC: 2 MMOL/L (ref 3–18)
APPEARANCE UR: CLEAR
APTT PPP: 31.7 SEC (ref 23–36.4)
ATRIAL RATE: 78 BPM
BASOPHILS # BLD: 0 K/UL (ref 0–0.1)
BASOPHILS NFR BLD: 1 % (ref 0–2)
BILIRUB UR QL: NEGATIVE
BLOOD GROUP ANTIBODIES SERPL: NORMAL
BUN SERPL-MCNC: 14 MG/DL (ref 7–18)
BUN/CREAT SERPL: 14 (ref 12–20)
CALCIUM SERPL-MCNC: 9.1 MG/DL (ref 8.5–10.1)
CALCULATED P AXIS, ECG09: 47 DEGREES
CALCULATED R AXIS, ECG10: -16 DEGREES
CALCULATED T AXIS, ECG11: 53 DEGREES
CHLORIDE SERPL-SCNC: 107 MMOL/L (ref 100–111)
CO2 SERPL-SCNC: 32 MMOL/L (ref 21–32)
COLOR UR: YELLOW
CREAT SERPL-MCNC: 1.03 MG/DL (ref 0.6–1.3)
DIAGNOSIS, 93000: NORMAL
DIFFERENTIAL METHOD BLD: ABNORMAL
EOSINOPHIL # BLD: 0.1 K/UL (ref 0–0.4)
EOSINOPHIL NFR BLD: 2 % (ref 0–5)
ERYTHROCYTE [DISTWIDTH] IN BLOOD BY AUTOMATED COUNT: 12.6 % (ref 11.6–14.5)
EST. AVERAGE GLUCOSE BLD GHB EST-MCNC: 117 MG/DL
GLUCOSE SERPL-MCNC: 106 MG/DL (ref 74–99)
GLUCOSE UR STRIP.AUTO-MCNC: NEGATIVE MG/DL
HBA1C MFR BLD: 5.7 % (ref 4.2–5.6)
HCT VFR BLD AUTO: 40.8 % (ref 36–48)
HGB BLD-MCNC: 13.5 G/DL (ref 13–16)
HGB UR QL STRIP: NEGATIVE
INR PPP: 1.6 (ref 0.8–1.2)
KETONES UR QL STRIP.AUTO: NEGATIVE MG/DL
LEUKOCYTE ESTERASE UR QL STRIP.AUTO: NEGATIVE
LYMPHOCYTES # BLD: 1.2 K/UL (ref 0.9–3.6)
LYMPHOCYTES NFR BLD: 21 % (ref 21–52)
MCH RBC QN AUTO: 31.9 PG (ref 24–34)
MCHC RBC AUTO-ENTMCNC: 33.1 G/DL (ref 31–37)
MCV RBC AUTO: 96.5 FL (ref 78–100)
MONOCYTES # BLD: 0.6 K/UL (ref 0.05–1.2)
MONOCYTES NFR BLD: 10 % (ref 3–10)
NEUTS SEG # BLD: 3.7 K/UL (ref 1.8–8)
NEUTS SEG NFR BLD: 66 % (ref 40–73)
NITRITE UR QL STRIP.AUTO: NEGATIVE
P-R INTERVAL, ECG05: 188 MS
PH UR STRIP: 7 [PH] (ref 5–8)
PLATELET # BLD AUTO: 175 K/UL (ref 135–420)
PMV BLD AUTO: 11.3 FL (ref 9.2–11.8)
POTASSIUM SERPL-SCNC: 4.1 MMOL/L (ref 3.5–5.5)
PROT UR STRIP-MCNC: NEGATIVE MG/DL
PROTHROMBIN TIME: 19.1 SEC (ref 11.5–15.2)
Q-T INTERVAL, ECG07: 370 MS
QRS DURATION, ECG06: 82 MS
QTC CALCULATION (BEZET), ECG08: 421 MS
RBC # BLD AUTO: 4.23 M/UL (ref 4.35–5.65)
SODIUM SERPL-SCNC: 141 MMOL/L (ref 136–145)
SP GR UR REFRACTOMETRY: 1.02 (ref 1–1.03)
SPECIMEN EXP DATE BLD: NORMAL
UROBILINOGEN UR QL STRIP.AUTO: 0.2 EU/DL (ref 0.2–1)
VENTRICULAR RATE, ECG03: 78 BPM
WBC # BLD AUTO: 5.6 K/UL (ref 4.6–13.2)

## 2021-10-27 PROCEDURE — 85730 THROMBOPLASTIN TIME PARTIAL: CPT

## 2021-10-27 PROCEDURE — 80048 BASIC METABOLIC PNL TOTAL CA: CPT

## 2021-10-27 PROCEDURE — 86901 BLOOD TYPING SEROLOGIC RH(D): CPT

## 2021-10-27 PROCEDURE — 83036 HEMOGLOBIN GLYCOSYLATED A1C: CPT

## 2021-10-27 PROCEDURE — 71046 X-RAY EXAM CHEST 2 VIEWS: CPT

## 2021-10-27 PROCEDURE — 82040 ASSAY OF SERUM ALBUMIN: CPT

## 2021-10-27 PROCEDURE — 85025 COMPLETE CBC W/AUTO DIFF WBC: CPT

## 2021-10-27 PROCEDURE — 87086 URINE CULTURE/COLONY COUNT: CPT

## 2021-10-27 PROCEDURE — 81003 URINALYSIS AUTO W/O SCOPE: CPT

## 2021-10-27 PROCEDURE — 93005 ELECTROCARDIOGRAM TRACING: CPT

## 2021-10-27 PROCEDURE — 85610 PROTHROMBIN TIME: CPT

## 2021-10-27 PROCEDURE — 36415 COLL VENOUS BLD VENIPUNCTURE: CPT

## 2021-10-28 LAB
BACTERIA SPEC CULT: NORMAL
SERVICE CMNT-IMP: NORMAL

## 2021-11-01 DIAGNOSIS — Z96.652 HISTORY OF TOTAL LEFT KNEE REPLACEMENT (TKR): ICD-10-CM

## 2021-11-01 DIAGNOSIS — Z01.818 ENCOUNTER FOR PREOPERATIVE EXAMINATION FOR GENERAL SURGICAL PROCEDURE: ICD-10-CM

## 2021-11-01 DIAGNOSIS — M25.562 LEFT KNEE PAIN, UNSPECIFIED CHRONICITY: Primary | ICD-10-CM

## 2021-11-01 PROCEDURE — 73564 X-RAY EXAM KNEE 4 OR MORE: CPT | Performed by: PHYSICIAN ASSISTANT

## 2021-11-02 DIAGNOSIS — Z01.818 PREOPERATIVE TESTING: Primary | ICD-10-CM

## 2021-11-04 ENCOUNTER — OFFICE VISIT (OUTPATIENT)
Dept: ORTHOPEDIC SURGERY | Age: 66
End: 2021-11-04
Payer: MEDICARE

## 2021-11-04 VITALS
DIASTOLIC BLOOD PRESSURE: 72 MMHG | RESPIRATION RATE: 16 BRPM | HEIGHT: 70 IN | HEART RATE: 75 BPM | WEIGHT: 233 LBS | BODY MASS INDEX: 33.36 KG/M2 | SYSTOLIC BLOOD PRESSURE: 147 MMHG | TEMPERATURE: 97.5 F | OXYGEN SATURATION: 94 %

## 2021-11-04 DIAGNOSIS — Z79.01 WARFARIN ANTICOAGULATION: ICD-10-CM

## 2021-11-04 DIAGNOSIS — Z01.818 PRE-OPERATIVE EXAM: ICD-10-CM

## 2021-11-04 DIAGNOSIS — Z96.652 HISTORY OF TOTAL LEFT KNEE REPLACEMENT (TKR): Primary | ICD-10-CM

## 2021-11-04 PROCEDURE — G8536 NO DOC ELDER MAL SCRN: HCPCS | Performed by: PHYSICIAN ASSISTANT

## 2021-11-04 PROCEDURE — G8417 CALC BMI ABV UP PARAM F/U: HCPCS | Performed by: PHYSICIAN ASSISTANT

## 2021-11-04 PROCEDURE — 1101F PT FALLS ASSESS-DOCD LE1/YR: CPT | Performed by: PHYSICIAN ASSISTANT

## 2021-11-04 PROCEDURE — 99214 OFFICE O/P EST MOD 30 MIN: CPT | Performed by: PHYSICIAN ASSISTANT

## 2021-11-04 PROCEDURE — G8753 SYS BP > OR = 140: HCPCS | Performed by: PHYSICIAN ASSISTANT

## 2021-11-04 PROCEDURE — G8432 DEP SCR NOT DOC, RNG: HCPCS | Performed by: PHYSICIAN ASSISTANT

## 2021-11-04 PROCEDURE — G8427 DOCREV CUR MEDS BY ELIG CLIN: HCPCS | Performed by: PHYSICIAN ASSISTANT

## 2021-11-04 PROCEDURE — G8754 DIAS BP LESS 90: HCPCS | Performed by: PHYSICIAN ASSISTANT

## 2021-11-04 PROCEDURE — 3017F COLORECTAL CA SCREEN DOC REV: CPT | Performed by: PHYSICIAN ASSISTANT

## 2021-11-04 RX ORDER — ACETAMINOPHEN 325 MG/1
1000 TABLET ORAL ONCE
Status: CANCELLED | OUTPATIENT
Start: 2021-11-04 | End: 2021-11-04

## 2021-11-04 RX ORDER — PREGABALIN 25 MG/1
75 CAPSULE ORAL ONCE
Status: CANCELLED | OUTPATIENT
Start: 2021-11-04 | End: 2021-11-04

## 2021-11-04 RX ORDER — ENOXAPARIN SODIUM 100 MG/ML
INJECTION SUBCUTANEOUS
Qty: 3 EACH | Refills: 0 | Status: SHIPPED | OUTPATIENT
Start: 2021-11-04 | End: 2022-01-07

## 2021-11-04 RX ORDER — WARFARIN 1 MG/1
10 TABLET ORAL ONCE
Status: CANCELLED | OUTPATIENT
Start: 2021-11-04 | End: 2021-11-04

## 2021-11-04 NOTE — PATIENT INSTRUCTIONS
Patient: Michel Piña                MRN: [id number]       SSN: [id number]  YOB: 1955        AGE: 74 y.o. SEX: male  Body mass index is 33.43 kg/m². 11/04/21    DO:  1:  Sit with the leg out straight 5-10 min every hour while awake. Keep the toes pointed       up towards the celia. 2.  Bend your knee 5-10 min every hour. Bend it to the point of pain and hold it for 5-10       Min. 3.  ICE your knee 20 min every hour    4. You can expect to have swelling and discomfort in your thigh down to your knee. Swelling may extend to your foot. You may have bruising from the thigh to the foot as well. Some numbness can be expected to the outside part of the knee. Wear the compression stockings and elevate your leg. DO NOT:    1. Do not place anything under your knee to prop it up  2. Do not sit in the recliner chair.

## 2021-11-04 NOTE — H&P
[address] [facility] [place], [address] [facility]  [phone]           HISTORY & PHYSICAL      Patient: Lizzy Rodriguez                MRN: [id number]       SSN: [id number]  YOB: 1955        AGE: 74 y.o. SEX: male  Body mass index is 33.43 kg/m². PCP: Alejo Dc MD  11/04/21      CC: failed left TKA  Problem List Items Addressed This Visit        Other    Warfarin anticoagulation    Relevant Medications    enoxaparin (Lovenox) 30 mg/0.3 mL injection      Other Visit Diagnoses     History of total left knee replacement (TKR)    -  Primary    Pre-operative exam                HPI:  The patient is a pleasant 74 y.o. whom had a previous TKA of their Left knee and has developed femoral loosening of the knee. He has been worked up for infection and fortunately negative. Due to the current findings and affected activities of daily living, surgical intervention is indicated. The alternatives, risks, complications, as well as expected outcome were discussed. These include but are not limited to infection, blood loss, need for blood transfusion, neurovascular damage, DVT, PE,  post-op stiffness and pain, leg length discrepancy, dislocation, anesthetic complications, prothesis longevity, need for more surgery, MI, stroke, and even death. The patient understands and wishes to proceed with surgery. Past Medical History:   Diagnosis Date    Arthritis     Bleeding     Chronic pain     knee and shoulder    DVT (deep venous thrombosis) ([address] 1992    post sx.   R LEG    D[facility]deep [facility] thrombosis) ([facility]) 2000    POST BACK SX. 2- LEFT LEG    GERD (gastroesophageal reflux disease)     Headache(784.0)     migraine    High cholesterol     Hypertension     Lower back pain 11/6/2010    Other chest pain     Personal history of prostate cancer     Pure hypercholesterolemia     Right buttock pain 11/6/2010    Right foot pain     Rotator cuff tear     left-since 2010, worsened tear Jan.    Rotator cuff tear, right     Spinal stenosis     Tendonitis, tibialis     anterior    Thromboembolus (H[facility])          Current Outpatient Medications:     enoxaparin (Lovenox) 30 mg/0.3 mL injection, Inject one daily Sunday 11-7-21 am through Tues 11-9-21 am, Disp: 3 Each, Rfl: 0    gabapentin (NEURONTIN) 100 mg capsule, Take 2 Capsules by mouth nightly. Max Daily Amount: 200 mg., Disp: 180 Capsule, Rfl: 0    metaxalone (Skelaxin) 800 mg tablet, Take 1 tab by mouth BID-TID prn muscle spasm, Disp: 60 Tablet, Rfl: 2    methylPREDNISolone (MEDROL DOSEPACK) 4 mg tablet, Per dose pack instructions, Disp: 1 Dose Pack, Rfl: 0    diclofenac (VOLTAREN) 1 % gel, Apply 4 g to affected area four (4) times daily. , Disp: 500 g, Rfl: 3    L gasseri/B bifidum/B longum ([employer] PO), Take  by mouth nightly., Disp: , Rfl:     lansoprazole (PREVACID) 30 mg capsule, Take 30 mg by mouth daily. , Disp: , Rfl:     warfarin (COUMADIN) 5 mg tablet, TAKE 2 AND 1/2 TABLETS BY MOUTH DAILY OR AS DIRECTED (Patient taking differently: Take  by mouth daily. TAKE 2 AND 1/2 TABLETS BY MOUTH TUES, THURS, SAT or as directed), Disp: 75 Tab, Rfl: 0    lisinopril-hydroCHLOROthiazide (PRINZIDE, ZESTORETIC) 20-12.5 mg per tablet, TAKE 1 TABLET BY MOUTH DAILY (Patient taking differently: Take 1 Tab by mouth daily. TAKE 1 TABLET BY MOUTH DAILY), Disp: 90 Tab, Rfl: 1    labetalol (NORMODYNE) 100 mg tablet, TAKE 1 TABLET BY MOUTH TWICE DAILY. STOP VERAPAMIL (Patient taking differently: Take  by mouth two (2) times a day.), Disp: 180 Tab, Rfl: 0    butalbital-acetaminophen-caff (FIORICET) -40 mg per capsule, 2 cap three times per day as needed for headache (Patient taking differently: Take  by mouth daily.  2 cap three times per day as needed for headache), Disp: 180 Cap, Rfl: 1    ALPRAZolam (XANAX) 0.5 mg tablet, Take one half(1/2) tab to one(1) tab by mouth at bedtime as needed for sleep (Patient taking differently: Take  by mouth nightly as needed. Take one half(1/2) tab to one(1) tab by mouth at bedtime as needed for sleep), Disp: 30 Tab, Rfl: 0    montelukast (SINGULAIR) 10 mg tablet, TAKE 1 TABLET BY MOUTH EVERY DAY, Disp: 90 Tab, Rfl: 0    acetaminophen (TYLENOL) 500 mg tablet, Take 1,000 mg by mouth every six (6) hours as needed for Pain., Disp: , Rfl:     Allergies   Allergen Reactions    Amoxicillin Itching    Augmentin [Amoxicillin-Pot Clavulanate] Itching    Chlorhexidine Towelette Itching    Hibiclens [Chlorhexidine Gluconate] Itching    Milk Containing Products Diarrhea    Nsaids (Non-Steroidal Anti-Inflammatory Drug) Other (comments)     On blood thinner, contraindicated.      Penicillins Rash    Requip [Ropinirole] Nausea and Vomiting       Social History     Socioeconomic History    Marital status:      Spouse name: Not on file    Number of children: Not on file    Years of education: Not on file    Highest education level: Not on file   Occupational History    Not on file   Tobacco Use    Smoking status: Never Smoker    Smokeless tobacco: Never Used   Vaping Use    Vaping Use: Never used   Substance and Sexual Activity    Alcohol use: No    Drug use: Never    Sexual activity: Not Currently   Other Topics Concern     Service Not Asked    Blood Transfusions Not Asked    Caffeine Concern Not Asked    Occupational Exposure Not Asked    Hobby Hazards Not Asked    Sleep Concern Not Asked    Stress Concern Not Asked    Weight Concern Not Asked    Special Diet Not Asked    Back Care Not Asked    Exercise Not Asked    Bike Helmet Not Asked   [address] Not Asked    Self-Exams Not Asked   Social History Narrative    Not on file     Social Determinants of Health     Financial Resource Strain:     Difficulty of Paying Living Expenses: Not on file   Food Insecurity:     Worried About Running Out of Food in the Last Year: Not on file    [address] in the Last Year: Not on file   Transportation Needs:     Lack of Transportation (Medical): Not on file    Lack of Transportation (Non-Medical): Not on file   Physical Activity:     Days of Exercise per Week: Not on file    Minutes of Exercise per Session: Not on file   Stress:     Feeling of Stress : Not on file   Social Connections:     Frequency of Communication with Friends and Family: Not on file    Frequency of Social Gatherings with Friends and Family: Not on file    Attends Scientology Services: Not on file    Active Member of [address] [place] [address] [employer] or Organizations: Not on file    Attends Club or Organization Meetings: Not on file    Marital Status: Not on file   Intimate Partner Violence:     Fear of Current or Ex-Partner: Not on file    Emotionally Abused: Not on file    Physically Abused: Not on file    Sexually Abused: Not on file   Housing Stability:     Unable to Pay for Housing in the Last Year: Not on file    Number of [place] in the Last Year: Not on file    Unstable Housing in the Last Year: Not on file       Past Surgical History:   Procedure Laterality Date    FOOT/TOES SURGERY PROC UNLISTED      HX BACK SURGERY      HX HEENT Right 09/2018    eye surgery, macular     HX KNEE REPLACEMENT Left     HX ORTHOPAEDIC  06-25-12    Right foot with excision of bursa and adipose tissue from fifth metatarsal base by Dr. Marcela Parnell      lower back (1992 and 2000)    HX OTHER SURGICAL      left foot (2008)    HX OTHER SURGICAL      Retina repair     HX PROSTATECTOMY  11/2018    HX ROTATOR CUFF REPAIR Right 01/28/2019    by Dr. Khan Beams ARTHROSCOPY Left 12/2020    WORK RELATED INJURY    NERVE BLOCK      IA ANESTH,SURGERY OF SHOULDER      IA LAMINOTOMY         Family History:  Non-contributory.      PE:  Visit Vitals  BP (!) 147/72 (BP 1 Location: Right arm)   Pulse 75   Temp 97.5 °F (36.4 °C)   Resp 16   Ht 5' 10\" (1.778 m)   Wt 233 lb (105.7 kg)   SpO2 94%   BMI 33.43 kg/m²     A&O X3, NAD, well develop, well nourished  Heart: S1-S2, rrr  Lungs: CTA bilat  Abd: soft, nt, nt, + bs in all quadrants  Ext:  Pos distal pulses to DP, PT  Left knee- no erythema, ecchymosis. Good ROM    X-ray: Radiographs of the left knee done 11/1/2021 at the [facility] location including AP, lateral, skyline, 3 foot standing views as well as distal femur and proximal tibia show the total knee components to be in place with evidence for femoral loosening noted. Labs: labs were reviewed and wnl.  ua neg    A:  Left  Knee, failed TKA     P:  At this point we will move forward with surgery. Again, the alternatives, risks, complications, as well as expected outcome were discussed and the patient wishes to proceed with surgery. Pt has been instructed to stop aspirin, nsaids, rheumatologic medications and blood thinners. They have also been instructed to continue on any heart and bp meds and to take them the morning of surgery with sips of water. The patient will require in-patient admission. Admission as an in-patient is reasonable and necessary due to increased risk of surgery due to the factors indicated as well as the possible need for prolonged in-hospital or skilled post-acute care in order to improve this patient's functional ability. The patient was counseled at length about the risks of braydon Covid-19 during their perioperative period and any recovery window from their procedure. The patient was made aware that braydon Covid-19  may worsen their prognosis for recovering from their procedure and lend to a higher morbidity and/or mortality risk. All material risks, benefits, and reasonable alternatives including postponing the procedure were discussed. The patient does  wish to proceed with the procedure at this time.           Thania Hart

## 2021-11-05 ENCOUNTER — HOSPITAL ENCOUNTER (OUTPATIENT)
Dept: PREADMISSION TESTING | Age: 66
Discharge: HOME | End: 2021-11-05
Payer: MEDICARE

## 2021-11-05 DIAGNOSIS — Z01.818 PREOPERATIVE TESTING: ICD-10-CM

## 2021-11-05 LAB — SARS-COV-2, NAA: NOT DETECTED

## 2021-11-05 PROCEDURE — U0005 INFEC AGEN DETEC AMPLI PROBE: HCPCS

## 2021-11-05 PROCEDURE — U0003 INFECTIOUS AGENT DETECTION BY NUCLEIC ACID (DNA OR RNA); SEVERE ACUTE RESPIRATORY SYNDROME CORONAVIRUS 2 (SARS-COV-2) (CORONAVIRUS DISEASE [COVID-19]), AMPLIFIED PROBE TECHNIQUE, MAKING USE OF HIGH THROUGHPUT TECHNOLOGIES AS DESCRIBED BY CMS-2020-01-R: HCPCS

## 2021-11-11 ENCOUNTER — HOSPITAL ENCOUNTER (OUTPATIENT)
Dept: LAB | Age: 66
Discharge: HOME | End: 2021-11-11
Payer: MEDICARE

## 2021-11-11 ENCOUNTER — OFFICE VISIT (OUTPATIENT)
Dept: ORTHOPEDIC SURGERY | Age: 66
End: 2021-11-11
Payer: MEDICARE

## 2021-11-11 VITALS
TEMPERATURE: 97.5 F | HEART RATE: 75 BPM | OXYGEN SATURATION: 98 % | BODY MASS INDEX: 32.93 KG/M2 | HEIGHT: 70 IN | RESPIRATION RATE: 16 BRPM | DIASTOLIC BLOOD PRESSURE: 68 MMHG | WEIGHT: 230 LBS | SYSTOLIC BLOOD PRESSURE: 126 MMHG

## 2021-11-11 DIAGNOSIS — Z96.652 HISTORY OF TOTAL LEFT KNEE REPLACEMENT (TKR): ICD-10-CM

## 2021-11-11 DIAGNOSIS — M25.562 MEDIAL KNEE PAIN, LEFT: ICD-10-CM

## 2021-11-11 DIAGNOSIS — T84.84XS PAIN DUE TO TOTAL LEFT KNEE REPLACEMENT, SEQUELA: ICD-10-CM

## 2021-11-11 DIAGNOSIS — Z96.652 PAIN DUE TO TOTAL LEFT KNEE REPLACEMENT, SEQUELA: ICD-10-CM

## 2021-11-11 DIAGNOSIS — Z96.652 HISTORY OF TOTAL LEFT KNEE REPLACEMENT (TKR): Primary | ICD-10-CM

## 2021-11-11 LAB
BASOPHILS # BLD: 0 K/UL (ref 0–0.1)
BASOPHILS NFR BLD: 1 % (ref 0–2)
CRP SERPL-MCNC: 0.8 MG/DL (ref 0–0.3)
DIFFERENTIAL METHOD BLD: ABNORMAL
EOSINOPHIL # BLD: 0.1 K/UL (ref 0–0.4)
EOSINOPHIL NFR BLD: 2 % (ref 0–5)
ERYTHROCYTE [DISTWIDTH] IN BLOOD BY AUTOMATED COUNT: 12.7 % (ref 11.6–14.5)
ERYTHROCYTE [SEDIMENTATION RATE] IN BLOOD: 9 MM/HR (ref 0–20)
HCT VFR BLD AUTO: 40.8 % (ref 36–48)
HGB BLD-MCNC: 13.6 G/DL (ref 13–16)
IMM GRANULOCYTES # BLD AUTO: 0 K/UL (ref 0–0.04)
IMM GRANULOCYTES NFR BLD AUTO: 0 % (ref 0–0.5)
LYMPHOCYTES # BLD: 1.1 K/UL (ref 0.9–3.6)
LYMPHOCYTES NFR BLD: 20 % (ref 21–52)
MCH RBC QN AUTO: 32.5 PG (ref 24–34)
MCHC RBC AUTO-ENTMCNC: 33.3 G/DL (ref 31–37)
MCV RBC AUTO: 97.4 FL (ref 78–100)
MONOCYTES # BLD: 0.6 K/UL (ref 0.05–1.2)
MONOCYTES NFR BLD: 10 % (ref 3–10)
NEUTS SEG # BLD: 3.7 K/UL (ref 1.8–8)
NEUTS SEG NFR BLD: 67 % (ref 40–73)
NRBC # BLD: 0 K/UL (ref 0–0.01)
NRBC BLD-RTO: 0 PER 100 WBC
PLATELET # BLD AUTO: 176 K/UL (ref 135–420)
PMV BLD AUTO: 10.9 FL (ref 9.2–11.8)
RBC # BLD AUTO: 4.19 M/UL (ref 4.35–5.65)
URATE SERPL-MCNC: 7.5 MG/DL (ref 2.6–7.2)
WBC # BLD AUTO: 5.6 K/UL (ref 4.6–13.2)

## 2021-11-11 PROCEDURE — 73560 X-RAY EXAM OF KNEE 1 OR 2: CPT | Performed by: ORTHOPAEDIC SURGERY

## 2021-11-11 PROCEDURE — G8432 DEP SCR NOT DOC, RNG: HCPCS | Performed by: ORTHOPAEDIC SURGERY

## 2021-11-11 PROCEDURE — G8417 CALC BMI ABV UP PARAM F/U: HCPCS | Performed by: ORTHOPAEDIC SURGERY

## 2021-11-11 PROCEDURE — G8427 DOCREV CUR MEDS BY ELIG CLIN: HCPCS | Performed by: ORTHOPAEDIC SURGERY

## 2021-11-11 PROCEDURE — 84550 ASSAY OF BLOOD/URIC ACID: CPT

## 2021-11-11 PROCEDURE — 1101F PT FALLS ASSESS-DOCD LE1/YR: CPT | Performed by: ORTHOPAEDIC SURGERY

## 2021-11-11 PROCEDURE — 36415 COLL VENOUS BLD VENIPUNCTURE: CPT

## 2021-11-11 PROCEDURE — 83520 IMMUNOASSAY QUANT NOS NONAB: CPT

## 2021-11-11 PROCEDURE — 85025 COMPLETE CBC W/AUTO DIFF WBC: CPT

## 2021-11-11 PROCEDURE — G8536 NO DOC ELDER MAL SCRN: HCPCS | Performed by: ORTHOPAEDIC SURGERY

## 2021-11-11 PROCEDURE — 3017F COLORECTAL CA SCREEN DOC REV: CPT | Performed by: ORTHOPAEDIC SURGERY

## 2021-11-11 PROCEDURE — 85652 RBC SED RATE AUTOMATED: CPT

## 2021-11-11 PROCEDURE — G8752 SYS BP LESS 140: HCPCS | Performed by: ORTHOPAEDIC SURGERY

## 2021-11-11 PROCEDURE — 86140 C-REACTIVE PROTEIN: CPT

## 2021-11-11 PROCEDURE — G8754 DIAS BP LESS 90: HCPCS | Performed by: ORTHOPAEDIC SURGERY

## 2021-11-11 PROCEDURE — 99214 OFFICE O/P EST MOD 30 MIN: CPT | Performed by: ORTHOPAEDIC SURGERY

## 2021-11-11 NOTE — PROGRESS NOTES
Patient: Zarina Silva                MRN: [id number]       SSN: [id number]  YOB: 1955        AGE: 74 y.o. SEX: male  Body mass index is 33 kg/m². PCP: Vladislav Byrne MD  11/11/21    Reyes Graves presents today with reevaluation left-sided knee pain really started in the springtime were then starting having some problems and increasing pain and swelling has had bilateral blood clots since 2000 on takes Coumadin as well and describes a little bit of start up discomfort its gradually been worsening and we had him scheduled for surgery the last x-rays were indeterminate and I wanted to review him personally prior to the operating room    I describes moderate pain hurts all the time but worse when he first gets up or prolonged walking in the examination today there is no true start up discomfort although it takes him a second to get going he has a bit of neuropathy in that leg and I think there is a 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 And just a minimally antalgic component to the gait the knee is not hot or red there is a mild effusion he did have an aspiration about 6 weeks ago which was completely negative. I did review his x-rays today 2 views of the left knee 11/11/2021 and there are some changes on the femoral side is not grossly loose.     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 but I think it still remains well fixed    He describes one medial pain and he does have some discomfort mild discomfort on the medial joint line    Were going to repeat the blood tests and also a three-phase bone scan and I will review them at the earliest convenience to go over the results together I do suspect will be doing a revision likely of the femoral side and and likely a thicker bearing I think he is in degree of recurvatum thank you    REVIEW OF SYSTEMS:      CON: negative  EYE: negative ENT: negative  RESP: negative  GI:    negative   :  negative  MSK: Positive  A twelve point review of systems was completed, positives noted and all other systems were reviewed and are negative          Past Medical History:   Diagnosis Date    Arthritis     Bleeding     Chronic pain     knee and shoulder    DVT (deep venous thrombosis) ([address] 1992    post sx. R LEG    DVT (deep venous thrombosis) ([facility]) 2000    POST BACK SX. 2- LEFT LEG    GERD (gastroesophageal reflux disease)     Headache(784.0)     migraine    High cholesterol     Hypertension     Lower back pain 11/6/2010    Other chest pain     Personal history of prostate cancer     Pure hypercholesterolemia     Right buttock pain 11/6/2010    Right foot pain     Rotator cuff tear     left-since 2010, worsened tear Jan.    Rotator cuff tear, right     Spinal stenosis     Tendonitis, tibialis     anterior    [facility]embolus [facility]        Family History   Problem Relation Age of Onset   Bonnie Trimble Arthritis-rheumatoid Mother     Dementia Mother     Heart Disease Father     Cancer Father     Hypertension Other     Cancer Brother        Current Outpatient Medications   Medication Sig Dispense Refill    enoxaparin (Lovenox) 30 mg/0.3 mL injection Inject one daily Sunday 11-7-21 am through Tues 11-9-21 am 3 Each 0    gabapentin (NEURONTIN) 100 mg capsule Take 2 Capsules by mouth nightly. Max Daily Amount: 200 mg. 180 Capsule 0    metaxalone (Skelaxin) 800 mg tablet Take 1 tab by mouth BID-TID prn muscle spasm 60 Tablet 2    methylPREDNISolone (MEDROL DOSEPACK) 4 mg tablet Per dose pack instructions 1 Dose Pack 0    diclofenac (VOLTAREN) 1 % gel Apply 4 g to affected area four (4) times daily. 500 g 3    L gasseri/B bifidum/B longum ([facility] [address]) Take  by mouth nightly.  lansoprazole (PREVACID) 30 mg capsule Take 30 mg by mouth daily.       warfarin (COUMADIN) 5 mg tablet TAKE 2 AND 1/2 TABLETS BY MOUTH DAILY OR AS DIRECTED (Patient taking differently: Take  by mouth daily. TAKE 2 AND 1/2 TABLETS BY MOUTH TUES, THURS, SAT or as directed) 75 Tab 0    lisinopril-hydroCHLOROthiazide (PRINZIDE, ZESTORETIC) 20-12.5 mg per tablet TAKE 1 TABLET BY MOUTH DAILY (Patient taking differently: Take 1 Tab by mouth daily. TAKE 1 TABLET BY MOUTH DAILY) 90 Tab 1    labetalol (NORMODYNE) 100 mg tablet TAKE 1 TABLET BY MOUTH TWICE DAILY. STOP VERAPAMIL (Patient taking differently: Take  by mouth two (2) times a day.) 180 Tab 0    butalbital-acetaminophen-caff (FIORICET) -40 mg per capsule 2 cap three times per day as needed for headache (Patient taking differently: Take  by mouth daily. 2 cap three times per day as needed for headache) 180 Cap 1    ALPRAZolam (XANAX) 0.5 mg tablet Take one half(1/2) tab to one(1) tab by mouth at bedtime as needed for sleep (Patient taking differently: Take  by mouth nightly as needed. Take one half(1/2) tab to one(1) tab by mouth at bedtime as needed for sleep) 30 Tab 0    montelukast (SINGULAIR) 10 mg tablet TAKE 1 TABLET BY MOUTH EVERY DAY 90 Tab 0    acetaminophen (TYLENOL) 500 mg tablet Take 1,000 mg by mouth every six (6) hours as needed for Pain. Allergies   Allergen Reactions    Amoxicillin Itching    Augmentin [Amoxicillin-Pot Clavulanate] Itching    Chlorhexidine Towelette Itching    Hibiclens [Chlorhexidine Gluconate] Itching    Milk Containing Products Diarrhea    Nsaids (Non-Steroidal Anti-Inflammatory Drug) Other (comments)     On blood thinner, contraindicated.      Penicillins Rash    Requip [Ropinirole] Nausea and Vomiting       Past Surgical History:   Procedure Laterality Date    FOOT/TOES SURGERY PROC UNLISTED      HX BACK SURGERY      HX HEENT Right 09/2018    eye surgery, macular     HX KNEE REPLACEMENT Left     HX ORTHOPAEDIC  06-25-12    Right foot with excision of bursa and adipose tissue from fifth metatarsal base by Dr. Jem López      lower back (1992 and 2000)    HX OTHER SURGICAL      left foot (2008)    HX OTHER SURGICAL      Retina repair     HX PROSTATECTOMY  11/2018    HX ROTATOR CUFF REPAIR Right 01/28/2019    by Dr. Brandon Malhotra ARTHROSCOPY Left 12/2020    WORK RELATED INJURY    NERVE BLOCK      [place] ANESTH,SURGERY OF SHOULDER      ID LAMINOTOMY         Social History     Socioeconomic History    Marital status:      Spouse name: Not on file    Number of children: Not on file    Years of education: Not on file    Highest education level: Not on file   Occupational History    Not on file   Tobacco Use    Smoking status: Never Smoker    Smokeless tobacco: Never Used   Vaping Use    Vaping Use: Never used   Substance and Sexual Activity    Alcohol use: No    Drug use: Never    Sexual activity: Not Currently   Other Topics Concern     Service Not Asked    Blood Transfusions Not Asked    Caffeine Concern Not Asked    Occupational Exposure Not Asked    Hobby Hazards Not Asked    Sleep Concern Not Asked    Stress Concern Not Asked    Weight Concern Not Asked    Special Diet Not Asked    Back Care Not Asked    Exercise Not Asked    Bike Helmet Not Asked    Seat Belt Not Asked    Self-Exams Not Asked   Social History Narrative    Not on file     Social Determinants of Health     Financial Resource Strain:     Difficulty of Paying Living Expenses: Not on file   Food Insecurity:     Worried About Running Out of Food in the Last Year: Not on file    Marley of Food in the Last Year: Not on file   Transportation Needs:     Lack of Transportation (Medical): Not on file    Lack of Transportation (Non-Medical):  Not on file   Physical Activity:     Days of Exercise per Week: Not on file    Minutes of Exercise per Session: Not on file   Stress:     Feeling of Stress : Not on file   Social Connections:     Frequency of Communication with Friends and Family: Not on file    Frequency of Social Gatherings with Friends and Family: Not on file    Attends Sabian[employer] Services: Not on file    Active Member of Clubs or Organizations: Not on file    Attends Club or Organization Meetings: Not on file    Marital Status: Not on file   Intimate Partner Violence:     Fear of Current or Ex-Partner: Not on file    Emotionally Abused: Not on file    Physically Abused: Not on file    Sexually Abused: Not on file   Housing Stability:     Unable to Pay for Housing in the Last Year: Not on file    Number of [place] in the Last Year: Not on file    Unstable Housing in the Last Year: Not on file       Visit Vitals  /68 (BP 1 Location: Left arm, BP Patient Position: Sitting)   Pulse 75   Temp 97.5 °F (36.4 °C) (Temporal)   Resp 16   Ht 5' 10\" (1.778 m)   Wt 104.3 kg (230 lb)   SpO2 98%   BMI 33.00 kg/m²         PHYSICAL EXAMINATION:  GENERAL: Alert and oriented x3, in no acute distress, well-developed, well-nourished, afebrile. HEART: No JVD. EYES: No scleral icterus   NECK: No significant lymphadenopathy   LUNGS: No respiratory compromise or indrawing  ABDOMEN: Soft, non-tender, non-distended. Note: This note was completed using voice recognition software. Any typographical/name errors or mistakes are unintentional.    Electronically signed by:  Pat Skaggs MD

## 2021-11-13 LAB — IL6 SERPL-MCNC: 3 PG/ML (ref 0–13)

## 2021-11-15 ENCOUNTER — HOSPITAL ENCOUNTER (OUTPATIENT)
Dept: NUCLEAR MEDICINE | Age: 66
Discharge: HOME | End: 2021-11-15
Attending: ORTHOPAEDIC SURGERY
Payer: MEDICARE

## 2021-11-15 ENCOUNTER — TELEPHONE (OUTPATIENT)
Dept: ORTHOPEDIC SURGERY | Age: 66
End: 2021-11-15

## 2021-11-15 PROCEDURE — A9503 TC99M MEDRONATE: HCPCS

## 2021-11-15 NOTE — TELEPHONE ENCOUNTER
Mariah Wheatley from [facility] called to have xrays pushed through to PACS for the bone scan. She is becoming frustrated because she says they have to call everyday for this to take placed. She is requesting a return call once we have pushed them through.      Mariah Wheatley [phone]

## 2021-11-17 ENCOUNTER — TELEPHONE (OUTPATIENT)
Dept: ORTHOPEDIC SURGERY | Age: 66
End: 2021-11-17

## 2021-11-17 NOTE — TELEPHONE ENCOUNTER
Contacted pt at his home number. Informed pt that he is to come in to the office on November 26th at [address] [place] [address] at his original scheduled time. Pt thanked writer for call and confirming that no changes were to be made.

## 2021-11-17 NOTE — TELEPHONE ENCOUNTER
Spouse called in regards to vm left to change appointment. Spouse states she was called personally by Dr Nenita Mendes who advised patient would not have to wait to be seen or schedule surgery.      Requesting call back: [phone]

## 2021-11-18 DIAGNOSIS — T84.84XS PAIN DUE TO TOTAL LEFT KNEE REPLACEMENT, SEQUELA: ICD-10-CM

## 2021-11-18 DIAGNOSIS — Z96.652 HISTORY OF TOTAL LEFT KNEE REPLACEMENT (TKR): ICD-10-CM

## 2021-11-18 DIAGNOSIS — Z96.652 PAIN DUE TO TOTAL LEFT KNEE REPLACEMENT, SEQUELA: ICD-10-CM

## 2021-11-18 DIAGNOSIS — M25.562 MEDIAL KNEE PAIN, LEFT: ICD-10-CM

## 2021-11-26 ENCOUNTER — DOCUMENTATION ONLY (OUTPATIENT)
Dept: ORTHOPEDIC SURGERY | Age: 66
End: 2021-11-26

## 2021-11-29 ENCOUNTER — TELEPHONE (OUTPATIENT)
Dept: ORTHOPEDIC SURGERY | Age: 66
End: 2021-11-29

## 2021-11-29 NOTE — TELEPHONE ENCOUNTER
Patient called to reschedule the appt that was cancelled last week. This would be to review the bone scan results to get him back on the schedule for surgery for a left total hip replacement. The soonest I can find would be 12/29/21 and he is anxious to get back on the schedule. He is asking if this can be discussed via a phone call to expedite him getting back on the schedule for surgery?     Patient [phone]

## 2021-11-29 NOTE — TELEPHONE ENCOUNTER
Needs to come in for appt with Dr. Amanda Bustillo so that he can review it and make a determination on surgical intervention.

## 2021-11-30 ENCOUNTER — TELEPHONE (OUTPATIENT)
Dept: ORTHOPEDIC SURGERY | Age: 66
End: 2021-11-30

## 2021-11-30 NOTE — TELEPHONE ENCOUNTER
Patient advised to make appt with Dr. Shukri Zimmer so that he can review it and make a determination on surgical intervention.

## 2021-11-30 NOTE — TELEPHONE ENCOUNTER
Patient called stating he had to cancel his appointment for tomorrow due to an urgent appointment with ortho. He stated since this is worker's comp, he needs to see Dr. Estee Locke before the end of the year. He wanted to know if he can be fit in to the schedule. Please advise patient at [phone].

## 2021-12-01 ENCOUNTER — OFFICE VISIT (OUTPATIENT)
Dept: ORTHOPEDIC SURGERY | Age: 66
End: 2021-12-01
Payer: MEDICARE

## 2021-12-01 VITALS
OXYGEN SATURATION: 98 % | TEMPERATURE: 97 F | WEIGHT: 231 LBS | BODY MASS INDEX: 33.07 KG/M2 | HEART RATE: 69 BPM | HEIGHT: 70 IN

## 2021-12-01 DIAGNOSIS — M10.9 GOUT, UNSPECIFIED CAUSE, UNSPECIFIED CHRONICITY, UNSPECIFIED SITE: ICD-10-CM

## 2021-12-01 DIAGNOSIS — T84.84XS PAIN DUE TO TOTAL LEFT KNEE REPLACEMENT, SEQUELA: ICD-10-CM

## 2021-12-01 DIAGNOSIS — T84.093S FAILED TOTAL LEFT KNEE REPLACEMENT, SEQUELA: ICD-10-CM

## 2021-12-01 DIAGNOSIS — Z86.718 HISTORY OF BLOOD CLOTS: ICD-10-CM

## 2021-12-01 DIAGNOSIS — Z96.652 HISTORY OF TOTAL LEFT KNEE REPLACEMENT (TKR): Primary | ICD-10-CM

## 2021-12-01 DIAGNOSIS — M23.92 LOCKING OF LEFT KNEE: ICD-10-CM

## 2021-12-01 DIAGNOSIS — Z96.652 PAIN DUE TO TOTAL LEFT KNEE REPLACEMENT, SEQUELA: ICD-10-CM

## 2021-12-01 PROCEDURE — G8536 NO DOC ELDER MAL SCRN: HCPCS | Performed by: ORTHOPAEDIC SURGERY

## 2021-12-01 PROCEDURE — 99214 OFFICE O/P EST MOD 30 MIN: CPT | Performed by: ORTHOPAEDIC SURGERY

## 2021-12-01 PROCEDURE — G8417 CALC BMI ABV UP PARAM F/U: HCPCS | Performed by: ORTHOPAEDIC SURGERY

## 2021-12-01 PROCEDURE — G8510 SCR DEP NEG, NO PLAN REQD: HCPCS | Performed by: ORTHOPAEDIC SURGERY

## 2021-12-01 PROCEDURE — G8427 DOCREV CUR MEDS BY ELIG CLIN: HCPCS | Performed by: ORTHOPAEDIC SURGERY

## 2021-12-01 PROCEDURE — 1101F PT FALLS ASSESS-DOCD LE1/YR: CPT | Performed by: ORTHOPAEDIC SURGERY

## 2021-12-01 PROCEDURE — 3017F COLORECTAL CA SCREEN DOC REV: CPT | Performed by: ORTHOPAEDIC SURGERY

## 2021-12-01 PROCEDURE — G8756 NO BP MEASURE DOC: HCPCS | Performed by: ORTHOPAEDIC SURGERY

## 2021-12-01 RX ORDER — ALLOPURINOL 100 MG/1
100 TABLET ORAL 2 TIMES DAILY
Qty: 60 TABLET | Refills: 0 | Status: SHIPPED | OUTPATIENT
Start: 2021-12-01

## 2021-12-01 NOTE — PROGRESS NOTES
Patient: Nenita Brooks                MRN: [id number]       SSN: [id number]  YOB: 1955        AGE: 74 y.o. SEX: male  Body mass index is 33.15 kg/m². PCP: Rachel Lofton MD  12/01/21    Mr. Claudia Chavez returns for reevaluation of left-sided knee pain he had his knee replacement almost 4 years ago and it turns out he has had several falls on it which may or may not be contributing to development of aseptic loosening especially on the femoral side he also has gout Queenie treat that for him as well has had 3 DVTs after back surgery 1 on the right side 2 on the left and has had subsequent shoulder surgery with no complications and he is got some start up discomfort the knee does not feel quite as stable as he would like. Examination today walks with his cane mildly antalgic component of the gait he does have mild start of pain but not severely so more in the thigh than the tibia.     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 I think the tibia is probably okay and I think we should plan on revising him again to start him on some allopurinol with usual precautions and we did talk about risks and benefits    Risks and benefits described including but not limited to infection DVT pulmonary embolism anesthetic complications blood loss requiring transfusion chronic pain instability implant longevity arthrofibrosis and also the need for bending and straightening knee on an hourly basis to avoid complications    REVIEW OF SYSTEMS:      CON: negative  EYE: negative   ENT: negative  RESP: negative  GI:    negative   :  negative  MSK: Positive  A twelve point review of systems was completed, positives noted and all other systems were reviewed and are negative          Past Medical History: Diagnosis Date    Arthritis     Bleeding     Chronic pain     knee and shoulder    DVT (deep venous thrombosis) ([address] 1992    post sx. R LEG    DVT ([facility] 2000    POST BACK SX. 2- LEFT LEG    GERD (gastroesophageal reflux disease)     Headache(784.0)     migraine    High cholesterol     Hypertension     Lower back pain 11/6/2010    Other chest pain     Personal history of prostate cancer     Pure hypercholesterolemia     Right buttock pain 11/6/2010    Right foot pain     Rotator cuff tear     left-since 2010, worsened tear Jan.    Rotator cuff tear, right     Spinal stenosis     Tendonitis, tibialis     anterior    Thromboembolus ([address]        Family History   Problem Relation Age of Onset   [facility] Arthritis-rheumatoid Mother     Dementia Mother     Heart Disease Father     Cancer Father     Hypertension Other     Cancer Brother        Current Outpatient Medications   Medication Sig Dispense Refill    allopurinoL (ZYLOPRIM) 100 mg tablet Take 1 Tablet by mouth two (2) times a day. 60 Tablet 0    enoxaparin (Lovenox) 30 mg/0.3 mL injection Inject one daily Sunday 11-7-21 am through Tues 11-9-21 am 3 Each 0    gabapentin (NEURONTIN) 100 mg capsule Take 2 Capsules by mouth nightly. Max Daily Amount: 200 mg. 180 Capsule 0    metaxalone (Skelaxin) 800 mg tablet Take 1 tab by mouth BID-TID prn muscle spasm 60 Tablet 2    methylPREDNISolone (MEDROL DOSEPACK) 4 mg tablet Per dose pack instructions 1 Dose Pack 0    diclofenac (VOLTAREN) 1 % gel Apply 4 g to affected area four (4) times daily. 500 g 3    L gasseri/B bifidum/B longum ([facility] [address]) Take  by mouth nightly.  lansoprazole (PREVACID) 30 mg capsule Take 30 mg by mouth daily.  warfarin (COUMADIN) 5 mg tablet TAKE 2 AND 1/2 TABLETS BY MOUTH DAILY OR AS DIRECTED (Patient taking differently: Take  by mouth daily.  TAKE 2 AND 1/2 TABLETS BY MOUTH TUES, THURS, SAT or as directed) 75 Tab 0    lisinopril-hydroCHLOROthiazide (PRINZIDE, ZESTORETIC) 20-12.5 mg per tablet TAKE 1 TABLET BY MOUTH DAILY (Patient taking differently: Take 1 Tab by mouth daily. TAKE 1 TABLET BY MOUTH DAILY) 90 Tab 1    labetalol (NORMODYNE) 100 mg tablet TAKE 1 TABLET BY MOUTH TWICE DAILY. STOP VERAPAMIL (Patient taking differently: Take  by mouth two (2) times a day.) 180 Tab 0    butalbital-acetaminophen-caff (FIORICET) -40 mg per capsule 2 cap three times per day as needed for headache (Patient taking differently: Take  by mouth daily. 2 cap three times per day as needed for headache) 180 Cap 1    ALPRAZolam (XANAX) 0.5 mg tablet Take one half(1/2) tab to one(1) tab by mouth at bedtime as needed for sleep (Patient taking differently: Take  by mouth nightly as needed. Take one half(1/2) tab to one(1) tab by mouth at bedtime as needed for sleep) 30 Tab 0    montelukast (SINGULAIR) 10 mg tablet TAKE 1 TABLET BY MOUTH EVERY DAY 90 Tab 0    acetaminophen (TYLENOL) 500 mg tablet Take 1,000 mg by mouth every six (6) hours as needed for Pain. Allergies   Allergen Reactions    Amoxicillin Itching    Augmentin [Amoxicillin-Pot Clavulanate] Itching    Chlorhexidine Towelette Itching    Hibiclens [Chlorhexidine Gluconate] Itching    Milk Containing Products Diarrhea    Nsaids (Non-Steroidal Anti-Inflammatory Drug) Other (comments)     On blood thinner, contraindicated.      Penicillins Rash    Requip [Ropinirole] Nausea and Vomiting       Past Surgical History:   Procedure Laterality Date    FOOT/TOES SURGERY PROC UNLISTED      HX BACK SURGERY      HX HEENT Right 09/2018    eye surgery, macular     HX KNEE REPLACEMENT Left     HX ORTHOPAEDIC  06-25-12    Right foot with excision of bursa and adipose tissue from fifth metatarsal base by Dr. Mariela Eller      lower back (1992 and 2000)    HX OTHER SURGICAL      left foot (2008)    HX OTHER SURGICAL      Retina repair     HX PROSTATECTOMY 11/2018    HX ROTATOR CUFF REPAIR Right 01/28/2019    by Dr. Norm Blakely ARTHROSCOPY Left 12/2020    WORK RELATED INJURY    NERVE BLOCK      [place] ANEST[place],SURGERY OF SHOULDER      [place] LAMINOTOM[place]         Social History     Socioeconomic History    Marital status:      Spouse name: Not on file    Number of children: Not on file    Years of education: Not on file    Highest education level: Not on file   Occupational History    Not on file   Tobacco Use    Smoking status: Never Smoker    Smokeless tobacco: Never Used   Vaping Use    Vaping Use: Never used   Substance and Sexual Activity    Alcohol use: No    Drug use: Never    Sexual activity: Not Currently   Other Topics Concern     Service Not Asked    Blood Transfusions Not Asked    Caffeine Concern Not Asked    Occupational Exposure Not Asked    Hobby Hazards Not Asked    Sleep Concern Not Asked    Stress Concern Not Asked    Weight Concern Not Asked    Special Diet Not Asked    Back Care Not Asked    Exercise Not Asked    Bike Helmet Not Asked    Seat Belt Not Asked    Self-Exams Not Asked   Social History Narrative    Not on file     Social Determinants of Health     Financial Resource Strain:     Difficulty of Paying Living Expenses: Not on file   Food Insecurity:     Worried About Running Out of Food in the Last Year: Not on file    Marley of Food in the Last Year: Not on file   Transportation Needs:     Lack of Transportation (Medical): Not on file    Lack of Transportation (Non-Medical):  Not on file   Physical Activity:     Days of Exercise per Week: Not on file    Minutes of Exercise per Session: Not on file   Stress:     Feeling of Stress : Not on file   Social Connections:     Frequency of Communication with Friends and Family: Not on file    Frequency of Social Gatherings with Friends and Family: Not on file    Attends [employer] Services: Not on file   [employer] of Clubs or Organizations: Not on file    Attends Club or Organization Meetings: Not on file    Marital Status: Not on file   Intimate Partner Violence:     Fear of Current or Ex-Partner: Not on file    Emotionally Abused: Not on file    Physically Abused: Not on file    Sexually Abused: Not on file   Housing Stability:     Unable to Pay for Housing in the Last Year: Not on file    Number of [place] in the Last Year: Not on file    Unstable Housing in the Last Year: Not on file       Visit Vitals  Pulse 69   Temp 97 °F (36.1 °C) (Temporal)   Ht 5' 10\" (1.778 m)   Wt 231 lb (104.8 kg)   SpO2 98%   BMI 33.15 kg/m²         PHYSICAL EXAMINATION:  GENERAL: Alert and oriented x3, in no acute distress, well-developed, well-nourished, afebrile. HEART: No JVD. EYES: No scleral icterus   NECK: No significant lymphadenopathy   LUNGS: No respiratory compromise or indrawing  ABDOMEN: Soft, non-tender, non-distended. Note: This note was completed using voice recognition software. Any typographical/name errors or mistakes are unintentional.    Electronically signed by:  Albertina Gilbert MD

## 2021-12-03 ENCOUNTER — DOCUMENTATION ONLY (OUTPATIENT)
Dept: ORTHOPEDIC SURGERY | Age: 66
End: 2021-12-03

## 2021-12-03 NOTE — TELEPHONE ENCOUNTER
Eyad Pacheco MD  o Nurses 15 hours ago (4:49 PM)     [facility]    Please reschedule pt at [facility] office in next 1-2 weeks -- has to have appt due to [facility] requirements before January -- thanks    Message text

## 2021-12-03 NOTE — PROGRESS NOTES
[facility] attempted to contact the patient with no success and unable to leave a VM. Should he call back please give him an appointment in the [facility] office in the next 1-2 weeks per Dr Aintha Leach.

## 2021-12-06 ENCOUNTER — OFFICE VISIT (OUTPATIENT)
Dept: ORTHOPEDIC SURGERY | Age: 66
End: 2021-12-06
Payer: OTHER MISCELLANEOUS

## 2021-12-06 VITALS
HEART RATE: 72 BPM | TEMPERATURE: 98.2 F | DIASTOLIC BLOOD PRESSURE: 84 MMHG | OXYGEN SATURATION: 95 % | SYSTOLIC BLOOD PRESSURE: 128 MMHG | BODY MASS INDEX: 33.5 KG/M2 | HEIGHT: 70 IN | WEIGHT: 234 LBS

## 2021-12-06 DIAGNOSIS — M79.18 MYOFASCIAL PAIN: ICD-10-CM

## 2021-12-06 DIAGNOSIS — M48.061 SPINAL STENOSIS OF LUMBAR REGION WITHOUT NEUROGENIC CLAUDICATION: ICD-10-CM

## 2021-12-06 DIAGNOSIS — M51.26 DISPLACEMENT OF LUMBAR INTERVERTEBRAL DISC WITHOUT MYELOPATHY: ICD-10-CM

## 2021-12-06 DIAGNOSIS — Z79.01 WARFARIN ANTICOAGULATION: ICD-10-CM

## 2021-12-06 DIAGNOSIS — G62.9 NEUROPATHY: ICD-10-CM

## 2021-12-06 DIAGNOSIS — M47.816 FACET ARTHROPATHY, LUMBAR: Primary | ICD-10-CM

## 2021-12-06 DIAGNOSIS — M51.36 DDD (DEGENERATIVE DISC DISEASE), LUMBAR: ICD-10-CM

## 2021-12-06 PROCEDURE — 99213 OFFICE O/P EST LOW 20 MIN: CPT | Performed by: PHYSICAL MEDICINE & REHABILITATION

## 2021-12-06 RX ORDER — GABAPENTIN 100 MG/1
200 CAPSULE ORAL
Qty: 180 CAPSULE | Refills: 0 | Status: SHIPPED | OUTPATIENT
Start: 2021-12-06 | End: 2022-04-05 | Stop reason: SDUPTHER

## 2021-12-06 NOTE — PATIENT INSTRUCTIONS
Low Back Arthritis: Exercises  Introduction  Here are some examples of typical rehabilitation exercises for your condition. Start each exercise slowly. Ease off the exercise if you start to have pain. Your doctor or physical therapist will tell you when you can start these exercises and which ones will work best for you. When you are not being active, find a comfortable position for rest. Some people are comfortable on the floor or a medium-firm bed with a small pillow under their head and another under their knees. Some people prefer to lie on their side with a pillow between their knees. Don't stay in one position for too long. Take short walks (10 to 20 minutes) every 2 to 3 hours. Avoid slopes, hills, and stairs until you feel better. Walk only distances you can manage without pain, especially leg pain. How to do the exercises  Pelvic tilt    1. Lie on your back with your knees bent. 2. \"Brace\" your stomach--tighten your muscles by pulling in and imagining your belly button moving toward your spine. 3. Press your lower back into the floor. You should feel your hips and pelvis rock back. 4. Hold for 6 seconds while breathing smoothly. 5. Relax and allow your pelvis and hips to rock forward. 6. Repeat 8 to 12 times. Back stretches    1. Get down on your hands and knees on the floor. 2. Relax your head and allow it to droop. Round your back up toward the ceiling until you feel a nice stretch in your upper, middle, and lower back. Hold this stretch for as long as it feels comfortable, or about 15 to 30 seconds. 3. Return to the starting position with a flat back while you are on your hands and knees. 4. Let your back sway by pressing your stomach toward the floor. Lift your buttocks toward the ceiling. 5. Hold this position for 15 to 30 seconds. 6. Repeat 2 to 4 times. Follow-up care is a key part of your treatment and safety.  Be sure to make and go to all appointments, and call your doctor if you are having problems. It's also a good idea to know your test results and keep a list of the medicines you take. Where can you learn more? Go to http://www.[employer].com/  Enter T094 in the search box to learn more about \"Low Back Arthritis: Exercises. \"  Current as of: July 1, 2021               Content Version: 13.0  © 2006-2021 [employer]. Care instructions adapted under license by [employer] (which disclaims liability or warranty for this information). If you have questions about a medical condition or this instruction, always ask your healthcare professional. [place] [address] any warranty or liability for your use of this information. Knee Arthritis: Exercises  Introduction  Here are some examples of exercises for you to try. The exercises may be suggested for a condition or for rehabilitation. Start each exercise slowly. Ease off the exercises if you start to have pain. You will be told when to start these exercises and which ones will work best for you. How to do the exercises  Knee flexion with heel slide    1. Lie on your back with your knees bent. 2. Slide your heel back by bending your affected knee as far as you can. Then hook your other foot around your ankle to help pull your heel even farther back. 3. Hold for about 6 seconds, then rest for up to 10 seconds. 4. Repeat 8 to 12 times. 5. Switch legs and repeat steps 1 through 4, even if only one knee is sore. Quad sets    1. Sit with your affected leg straight and supported on the floor or a firm bed. Place a small, rolled-up towel under your knee. Your other leg should be bent, with that foot flat on the floor. 2. Tighten the thigh muscles of your affected leg by pressing the back of your knee down into the towel. 3. Hold for about 6 seconds, then rest for up to 10 seconds. 4. Repeat 8 to 12 times.   5. Switch legs and repeat steps 1 through 4, even if only one knee is sore. Straight-leg raises to the front    1. Lie on your back with your good knee bent so that your foot rests flat on the floor. Your affected leg should be straight. Make sure that your low back has a normal curve. You should be able to slip your hand in between the floor and the small of your back, with your palm touching the floor and your back touching the back of your hand. 2. Tighten the thigh muscles in your affected leg by pressing the back of your knee flat down to the floor. Hold your knee straight. 3. Keeping the thigh muscles tight and your leg straight, lift your affected leg up so that your heel is about 12 inches off the floor. Hold for about 6 seconds, then lower slowly. 4. Relax for up to 10 seconds between repetitions. 5. Repeat 8 to 12 times. 6. Switch legs and repeat steps 1 through 5, even if only one knee is sore. Active knee flexion    1. Lie on your stomach with your knees straight. If your kneecap is uncomfortable, roll up a washcloth and put it under your leg just above your kneecap. 2. Lift the foot of your affected leg by bending the knee so that you bring the foot up toward your buttock. If this motion hurts, try it without bending your knee quite as far. This may help you avoid any painful motion. 3. Slowly move your leg up and down. 4. Repeat 8 to 12 times. 5. Switch legs and repeat steps 1 through 4, even if only one knee is sore. Quadriceps stretch (facedown)    1. Lie flat on your stomach, and rest your face on the floor. 2. Wrap a towel or belt strap around the lower part of your affected leg. Then use the towel or belt strap to slowly pull your heel toward your buttock until you feel a stretch. 3. Hold for about 15 to 30 seconds, then relax your leg against the towel or belt strap. 4. Repeat 2 to 4 times. 5. Switch legs and repeat steps 1 through 4, even if only one knee is sore. Stationary exercise bike    1.  If you do not have a stationary exercise bike at home, you can find one to ride at your local health club or community center. 2. Adjust the height of the bike seat so that your knee is slightly bent when your leg is extended downward. If your knee hurts when the pedal reaches the top, you can raise the seat so that your knee does not bend as much. 3. Start slowly. At first, try to do 5 to 10 minutes of cycling with little to no resistance. Then increase your time and the resistance bit by bit until you can do 20 to 30 minutes without pain. 4. If you start to have pain, rest your knee until your pain gets back to the level that is normal for you. Or cycle for less time or with less effort. Follow-up care is a key part of your treatment and safety. Be sure to make and go to all appointments, and call your doctor if you are having problems. It's also a good idea to know your test results and keep a list of the medicines you take. Where can you learn more? Go to http://www.[employer].com/  Enter C159 in the search box to learn more about \"Knee Arthritis: Exercises. \"  Current as of: July 1, 2021               Content Version: 13.0  © 2006-2021 [employer]. Care instructions adapted under license by [employer] (which disclaims liability or warranty for this information). If you have questions about a medical condition or this instruction, always ask your healthcare professional. [place] [address] any warranty or liability for your use of this information.

## 2021-12-06 NOTE — PROGRESS NOTES
[facility]  [address]ty [address]ana[address] [place] [address], [facility] [address]  Phone: [phone]  Fax: [phone]    Pt's YOB: 1955    ASSESSMENT   Diagnoses and all orders for this visit:    1. Facet arthropathy, lumbar  -     gabapentin (NEURONTIN) 100 mg capsule; Take 2 Capsules by mouth nightly. Max Daily Amount: 200 mg.    2. Spinal stenosis of lumbar region without neurogenic claudication  -     gabapentin (NEURONTIN) 100 mg capsule; Take 2 Capsules by mouth nightly. Max Daily Amount: 200 mg.    3. Myofascial pain    4. DDD (degenerative disc disease), lumbar    5. Neuropathy  -     gabapentin (NEURONTIN) 100 mg capsule; Take 2 Capsules by mouth nightly. Max Daily Amount: 200 mg.    6. Warfarin anticoagulation    7. Displacement of lumbar intervertebral disc without myelopathy         IMPRESSION AND PLAN:  Ko Diggs is a 74 y.o. male with history of lumbar pain. He complains of left knee pain and sharp pain in his left bicep subsequent to a tear sustained in a work injury. Pt is taking Neurontin 100 2 caps QHS. 1) Pt was given information on lumbar and knee exercises. 2) He received a refill of Neurontin 100 mg for neuropathic symptoms. 3) Pt remains on coumadin and is not a candidate for NSAIDs  4) Mr. Margarite Lanes has a reminder for a \"due or due soon\" health maintenance. I have asked that he contact his primary care provider, Smooth Morrison MD, for follow-up on this health maintenance. 5)  demonstrated consistency with prescribing. 6) Pt will continue with Skelaxin and does not need a refill at this time ( recently refilled)  Follow-up and Dispositions    · Return in about 4 months (around 4/6/2022) for Medication follow up. HISTORY OF PRESENT ILLNESS:  Ko Diggs is a 74 y.o. male with history of lumbar pain and presents to the office today for follow up.  Pt complains of left knee pain and sharp pain in his left bicep subsequent to a tear sustained in a work injury. He notes that his previous left shoulder pain has greatly improved. Pt states that he underwent a left knee replacement and was scheduled for revision on 11/10/2021 due to the femoral implant coming loose but the surgical appointment was suddenly canceled on the day of the procedure. He further states that he was rescheduled for 11/26/2021 and the appointment was once again cancelled. He further notes that his appointment was scheduled for the same day as his last office visit with me, but Dr. Ariel Walden rescheduled his follow-up appointment since pt \"could not see two doctors on the same day. \" Pt states that further imaging discovered that at least some of his knee pain is due to gout and he is now rescheduled for 01/14/2022. He also notes that he underwent a left knee aspiration in 09/2021. Pt is taking allopurinol for his gout, Neurontin 200 mg 2 caps QHS (which he discontinued prior to the surgery), and Skelaxin 800 mg. He states that he underwent a bone scan and a \"total body scan\" prior to the surgery due to a history of cancer and notes that no cancer was found on either imaging study. Pt at this time desires to continue with current care. Of note, pt ambulates with the assistance of a single point cane. Pain Scale: 2/10    PCP: Naldo Mobley MD     Past Medical History:   Diagnosis Date    Arthritis     Bleeding     Chronic pain     knee and shoulder    DVT (deep venous thrombosis) ([facility] [address] 1992    post sx.   R LEG    DVT (deep venous thrombosis) ([facility]) 2000    POST BACK SX. 2- LEFT LEG    GERD (gastroesophageal reflux disease)     Headache(784.0)     migraine    High cholesterol     Hypertension     Lower back pain 11/6/2010    Other chest pain     Personal history of prostate cancer     Pure hypercholesterolemia     Right buttock pain 11/6/2010    Right foot pain     Rotator cuff tear     left-since 2010, worsened tear Jan.    Rotator cuff tear, right     Spinal stenosis     Tendonitis, tibialis     anterior    Thromboembolus (HCC)         Social History     Socioeconomic History    Marital status:      Spouse name: Not on file    Number of children: Not on file    Years of education: Not on file    Highest education level: Not on file   Occupational History    Not on file   Tobacco Use    Smoking status: Never Smoker    Smokeless tobacco: Never Used   Vaping Use    Vaping Use: Never used   Substance and Sexual Activity    Alcohol use: No    Drug use: Never    Sexual activity: Not Currently   Other Topics Concern     Service Not Asked    Blood Transfusions Not Asked    Caffeine Concern Not Asked    Occupational Exposure Not Asked    Hobby Hazards Not Asked    Sleep Concern Not Asked    Stress Concern Not Asked    Weight Concern Not Asked    Special Diet Not Asked    Back Care Not Asked    Exercise Not Asked    Bike Helmet Not Asked    Seat Belt Not Asked    Self-Exams Not Asked   Social History Narrative    Not on file     Social Determinants of Health     Financial Resource Strain:     Difficulty of Paying Living Expenses: Not on file   Food Insecurity:     Worried About Running Out of Food in the Last Year: Not on file    Marley of Food in the Last Year: Not on file   Transportation Needs:     Lack of Transportation (Medical): Not on file    Lack of Transportation (Non-Medical):  Not on file   Physical Activity:     Days of Exercise per Week: Not on file    Minutes of Exercise per Session: Not on file   Stress:     Feeling of Stress : Not on file   Social Connections:     Frequency of Communication with Friends and Family: Not on file    Frequency of Social Gatherings with Friends and Family: Not on file    Attends Orthodoxy Services: Not on file    Active Member of Clubs or Organizations: Not on file    Attends Club or Organization Meetings: Not on file    Marital Status: Not on file   Intimate Partner Violence:     Fear of Current or Ex-Partner: Not on file    Emotionally Abused: Not on file    Physically Abused: Not on file    Sexually Abused: Not on file   Housing Stability:     Unable to Pay for Housing in the Last Year: Not on file    Number of Places Lived in the Last Year: Not on file    Unstable Housing in the Last Year: Not on file       Current Outpatient Medications   Medication Sig Dispense Refill    gabapentin (NEURONTIN) 100 mg capsule Take 2 Capsules by mouth nightly. Max Daily Amount: 200 mg. 180 Capsule 0    allopurinoL (ZYLOPRIM) 100 mg tablet Take 1 Tablet by mouth two (2) times a day. 60 Tablet 0    metaxalone (Skelaxin) 800 mg tablet Take 1 tab by mouth BID-TID prn muscle spasm 60 Tablet 2    diclofenac (VOLTAREN) 1 % gel Apply 4 g to affected area four (4) times daily. 500 g 3    L gasseri/B bifidum/B longum ([facility] [address]) Take  by mouth nightly.  lansoprazole (PREVACID) 30 mg capsule Take 30 mg by mouth daily.  warfarin (COUMADIN) 5 mg tablet TAKE 2 AND 1/2 TABLETS BY MOUTH DAILY OR AS DIRECTED (Patient taking differently: Take  by mouth daily. TAKE 2 AND 1/2 TABLETS BY MOUTH TUES, THURS, SAT or as directed) 75 Tab 0    lisinopril-hydroCHLOROthiazide (PRINZIDE, ZESTORETIC) 20-12.5 mg per tablet TAKE 1 TABLET BY MOUTH DAILY (Patient taking differently: Take 1 Tab by mouth daily. TAKE 1 TABLET BY MOUTH DAILY) 90 Tab 1    labetalol (NORMODYNE) 100 mg tablet TAKE 1 TABLET BY MOUTH TWICE DAILY. STOP VERAPAMIL (Patient taking differently: Take  by mouth two (2) times a day.) 180 Tab 0    butalbital-acetaminophen-caff (FIORICET) -40 mg per capsule 2 cap three times per day as needed for headache (Patient taking differently: Take  by mouth daily.  2 cap three times per day as needed for headache) 180 Cap 1    ALPRAZolam (XANAX) 0.5 mg tablet Take one half(1/2) tab to one(1) tab by mouth at bedtime as needed for sleep (Patient taking differently: Take  by mouth nightly as needed. Take one half(1/2) tab to one(1) tab by mouth at bedtime as needed for sleep) 30 Tab 0    montelukast (SINGULAIR) 10 mg tablet TAKE 1 TABLET BY MOUTH EVERY DAY 90 Tab 0    acetaminophen (TYLENOL) 500 mg tablet Take 1,000 mg by mouth every six (6) hours as needed for Pain.  enoxaparin (Lovenox) 30 mg/0.3 mL injection Inject one daily Sunday 11-7-21 am through Tues 11-9-21 am (Patient not taking: Reported on 12/6/2021) 3 Each 0    methylPREDNISolone (MEDROL DOSEPACK) 4 mg tablet Per dose pack instructions (Patient not taking: Reported on 12/6/2021) 1 Dose Pack 0       Allergies   Allergen Reactions    Amoxicillin Itching    Augmentin [Amoxicillin-Pot Clavulanate] Itching    Chlorhexidine Towelette Itching    Hibiclens [Chlorhexidine Gluconate] Itching    Milk Containing Products Diarrhea    Nsaids (Non-Steroidal Anti-Inflammatory Drug) Other (comments)     On blood thinner, contraindicated.  Penicillins Rash    Requip [Ropinirole] Nausea and Vomiting         REVIEW OF SYSTEMS    Constitutional: Negative for fever, chills, or weight change. Respiratory: Negative for cough or shortness of breath. Cardiovascular: Negative for chest pain or palpitations. Gastrointestinal: Negative for acid reflux, change in bowel habits, or constipation. Genitourinary: Negative for dysuria and flank pain. Musculoskeletal: Positive for left arm and knee pain. Pt ambulates with the assistance of a single point cane. Skin: Negative for rash. Neurological: Negative for headaches, dizziness, or numbness. Endo/Heme/Allergies: Negative for increased bruising. Psychiatric/Behavioral: Negative for difficulty with sleep.     As per HPI    PHYSICAL EXAMINATION  Visit Vitals  /84 (BP 1 Location: Right arm, BP Patient Position: Sitting, BP Cuff Size: Large adult)   Pulse 72   Temp 98.2 °F (36.8 °C) (Temporal)   Ht 5' 10\" (1.778 m)   Wt 234 lb (106.1 kg)   SpO2 95%   BMI 33.58 kg/m² Constitutional: Awake, alert, and in no acute distress. Neurological: Sensation to light touch is intact. Skin: warm, dry, and intact. Musculoskeletal:  No pain with extension, axial loading, or forward flexion. No pain with internal or external rotation of his hips. Negative straight leg raise bilaterally. Hip Flex  Quads Hamstrings Ankle DF EHL Ankle PF   Right +4/5 +4/5 +4/5 +4/5 +4/5 +4/5   Left  4/5  4/5  4/5 +4/5 +4/5 +4/5     IMAGING:    Lumbar spine MRI from 01/24/2018 was personally reviewed with the patient and demonstrated:  Results from [facility] on 01/24/18   MRI LUMB SPINE W WO CONT     Narrative MR lumbar spine with and without contrast    CPT CODE: 78395    HISTORY: Chronic and worsening low back pain with left lower extremity pain. Increasing weakness. Remote history of surgeries. No recent injury. COMPARISON: MRI January 2013. TECHNIQUE: Lumbar spine scanned with axial and sagittal T1W scans, axial and  sagittal T2W scans, and with post gadolinium axial and sagittal T1W scans. Contrast used: 10 cc Gadavist.    FINDINGS:     Prior laminotomies on the left at L4-5 and bilaterally at L5-S1. No fracture,  bone destruction, or fluid collection. Intact lordosis. Normal vertebral body heights. Small less than 5 mm low signal  focus within anterior L4, developed since 2013. No similar focus elsewhere. No  aggressive features. Otherwise generally fatty marrow signal with some chronic  fatty endplate changes straddling L5-S1. Moderate to severe disc space narrowing  and disc desiccation of that level. Mild to moderate narrowing and desiccation  of L3-4 and L4-5. Conus at T12-L1. Axial imaging correlation:    T12-L1:  Patent canal and foramina. L1-L2: Patent canal and foramina. L2-L3: Patent canal and foramina. L3-L4: Broad-based disc osteophyte complex. Bilateral facet arthropathy with  ligamentum flavum thickening and buckling.  Moderate concentric spinal stenosis. Particular narrowing of lateral recesses with transient distortion of the  crossing L4 nerves. Axial T2 image 20. Patent foramina.  Progression of  degenerative findings. L4-L5: Postoperative level. Mild broad-based disc osteophyte complex with a  small amount of enhancing scar tissue. Hypertrophic facet arthropathy. Moderate  spinal stenosis. Narrowing of the lateral recesses left more than right. Transient distortion of the crossing nerves particularly left L5. Axial T2 image  13. Mild foraminal stenoses.  Unchanged. L5-S1: Postoperative level. Broad-based disc osteophyte complex with enhancing  scar tissue centrally. Hypertrophic facet arthropathy. No significant spinal  stenosis. Moderately severe bilateral foraminal stenoses. Unchanged. Incidental imaging of regional soft tissues unremarkable.                  Impression IMPRESSION:    1. Some progression of degenerative disc and facet disease at L3-4, with  moderate spinal stenosis and potentially impingement of the crossing L4 nerves,  left more than right. 2. Stable postsurgical and degenerative findings at L4-5 and L5-S1.        Left shoulder MRI from 11/12/2021 was personally reviewed with the patient and demonstrated:    FINDINGS:    Rotator Cuff: Full-thickness tear of the supraspinatus with fluid-filled tendon defect measuring 3.7 cm in transverse dimension. Moderate infraspinatus tendinosis. Mild subscapularis tendinosis. Teres minor appears intact. Mild fatty change of the supraspinatus and infraspinatus. Subacromial Outlet: Moderate degenerative changes of the acromioclavicular joint. Moderate narrowing. Acromioclavicular joint effusion. Type 2 acromion. No os acromiale. Glenohumeral joint: No evidence of dislocation. Long Head of the Biceps Tendon: Full-thickness tear of the intra-articular portion with 8 cm of proximal tendon retraction. Moderate distension of the biceps tendon sheath.      Glenoid Labrum: Maceration of the superior labrum. No paralabral cyst.     Osseous Structures: No bone contusion or Sachs-Hill deformity. Soft Tissues: Moderate joint effusion with synovitis. Moderate distension of the subacromial subdeltoid bursa. Distension of the subcoracoid bursa. IMPRESSION:     1. Full-thickness tear of the supraspinatus with fluid-filled tendon defect measuring 3.7 cm. Tendon fibers are retracted to the level of the acromioclavicular joint. Mild fatty change of the supraspinatus and infraspinatus. 2. Moderate infraspinatus and mild subscapularis tendinosis. 3. Moderate narrowing of the subacromial outlet. 4. Fill-thickness retracted tear of the intra-articular biceps tendon. Tendon fibers are retracted past the bicipital groove, at least 8 cm from the biceps anchor insertion. 5. Moderate joint effusion with synovitis. 6. Moderate distension of the subacromial subdeltoid bursa. Written by Lo Haji, as dictated by Nanci Contreras MD.  I, Dr. Nanci Contreras confirm that all documentation is accurate.

## 2021-12-06 NOTE — PROGRESS NOTES
Jamila Monteiro presents today for   Chief Complaint   Patient presents with    Follow-up     back pain       Is someone accompanying this pt? no    Is the patient using any DME equipment during OV? Yes, cane    Depression Screening:  3 most recent PHQ Screens 12/1/2021   PHQ Not Done -   Little interest or pleasure in doing things Not at all   Feeling down, depressed, irritable, or hopeless Not at all   Total Score PHQ 2 0       Learning Assessment:  Learning Assessment 2/8/2019   PRIMARY LEARNER Patient   HIGHEST LEVEL OF EDUCATION - PRIMARY LEARNER  -   [address] PRIMARY LEARNER -   [address]  CAREGIVER -   PRIMARY LANGUAGE ENGLISH    NEED -   LEARNER PREFERENCE PRIMARY DEMONSTRATION   ANSWERED BY Patient   RELATIONSHIP SELF       Abuse Screening:  Abuse Screening Questionnaire 11/6/2018   Do you ever feel afraid of your partner? N   Are you in a relationship with someone who physically or mentally threatens you? N   Is it safe for you to go home? Y       Fall Risk  Fall Risk Assessment, last 12 mths 11/4/2021   Able to walk? Yes   Fall in past 12 months? 0   Do you feel unsteady? -   Are you worried about falling -   Is TUG test greater than 12 seconds? -   Is the gait abnormal? -   Number of falls in past 12 months -   Fall with injury? -       OPIOID RISK TOOL  No flowsheet data found. Coordination of Care:  1. Have you been to the ER, urgent care clinic since your last visit? no  Hospitalized since your last visit? no    2. Have you seen or consulted any other health care providers outside of the [address] [place] [address] since your last visit? Neurologist Include any pap smears or colon screening.  no

## 2021-12-07 DIAGNOSIS — Z01.818 PREOPERATIVE TESTING: Primary | ICD-10-CM

## 2021-12-07 DIAGNOSIS — T84.093A FAILED TOTAL LEFT KNEE REPLACEMENT, INITIAL ENCOUNTER (HCC): ICD-10-CM

## 2021-12-22 ENCOUNTER — OFFICE VISIT (OUTPATIENT)
Dept: ORTHOPEDIC SURGERY | Age: 66
End: 2021-12-22
Payer: MEDICARE

## 2021-12-22 VITALS
WEIGHT: 233 LBS | RESPIRATION RATE: 16 BRPM | BODY MASS INDEX: 33.36 KG/M2 | HEART RATE: 65 BPM | HEIGHT: 70 IN | TEMPERATURE: 97 F | OXYGEN SATURATION: 96 %

## 2021-12-22 DIAGNOSIS — L08.9 SKIN INFECTION OF LEFT KNEE: ICD-10-CM

## 2021-12-22 DIAGNOSIS — T84.84XS PAIN DUE TO TOTAL LEFT KNEE REPLACEMENT, SEQUELA: Primary | ICD-10-CM

## 2021-12-22 DIAGNOSIS — Z96.652 PAIN DUE TO TOTAL LEFT KNEE REPLACEMENT, SEQUELA: Primary | ICD-10-CM

## 2021-12-22 PROCEDURE — G8536 NO DOC ELDER MAL SCRN: HCPCS | Performed by: ORTHOPAEDIC SURGERY

## 2021-12-22 PROCEDURE — 99214 OFFICE O/P EST MOD 30 MIN: CPT | Performed by: ORTHOPAEDIC SURGERY

## 2021-12-22 PROCEDURE — 3017F COLORECTAL CA SCREEN DOC REV: CPT | Performed by: ORTHOPAEDIC SURGERY

## 2021-12-22 PROCEDURE — G8432 DEP SCR NOT DOC, RNG: HCPCS | Performed by: ORTHOPAEDIC SURGERY

## 2021-12-22 PROCEDURE — G8417 CALC BMI ABV UP PARAM F/U: HCPCS | Performed by: ORTHOPAEDIC SURGERY

## 2021-12-22 PROCEDURE — 1101F PT FALLS ASSESS-DOCD LE1/YR: CPT | Performed by: ORTHOPAEDIC SURGERY

## 2021-12-22 PROCEDURE — G8427 DOCREV CUR MEDS BY ELIG CLIN: HCPCS | Performed by: ORTHOPAEDIC SURGERY

## 2021-12-22 PROCEDURE — G8756 NO BP MEASURE DOC: HCPCS | Performed by: ORTHOPAEDIC SURGERY

## 2021-12-22 RX ORDER — CLOTRIMAZOLE AND BETAMETHASONE DIPROPIONATE 10; .5 MG/ML; MG/ML
LOTION TOPICAL 2 TIMES DAILY
Qty: 30 ML | Refills: 0 | Status: SHIPPED | OUTPATIENT
Start: 2021-12-22

## 2021-12-22 NOTE — PROGRESS NOTES
Patient: Eusebia Carreno                MRN: [id number]       SSN: [id number]  YOB: 1955        AGE: 74 y.o. SEX: male  Body mass index is 33.43 kg/m². PCP: Cassy Kellogg MD  12/22/21    Roberto returns for reevaluation of left-sided knee pain there is a history of inflammatory arthritis in the family is been well worked up for infection is had some previous falls and has aseptic loosening involving his femoral component proven by bone scan as well and serial x-rays    I started him on some allopurinol and infection been quite helpful as uric acid level was fairly elevated previously    He is set states that he has had some eczema outbreak on both legs and apparently has a prescription waiting for him    I had a look at them today the knee is not hot or red does have about a centimeter and a half of the eczematous lesion over the distal third of the wound he also has it on the pretibial area on both legs and as well.   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 and bone scan was positive which was repeated as well    Is feeling little bit better with decreased uric acid level will have Lotrisone available for him his other medication is not efficacious for him and I look forward to helping him he is on Coumadin and should be bridging with Lovenox 40 to see him back in about a week's time to make sure that eczematous lesion is healing up over the knee replacement area and also on the other areas on both legs thank you    REVIEW OF SYSTEMS:      CON: negative  EYE: negative   ENT: negative  RESP: negative  GI:    negative   :  negative  MSK: Positive  A twelve point review of systems was completed, positives noted and all other systems were reviewed and are negative          Past Medical History:   Diagnosis Date    Arthritis     Bleeding     Chronic pain     knee and shoulder    DVT (deep venous thrombosis) ([facility] [address] 1992    post sx. R LEG    DVT [facility] thrombosis) ([facility]) 2000    POST BACK SX. 2- LEFT LEG    GERD (gastroesophageal reflux disease)     Headache(784.0)     migraine    High cholesterol     Hypertension     Lower back pain 11/6/2010    Other chest pain     Personal history of prostate cancer     Pure hypercholesterolemia     Right buttock pain 11/6/2010    Right foot pain     Rotator cuff tear     left-since 2010, worsened tear Jan.    Rotator cuff tear, right     Spinal stenosis     Tendonitis, tibialis     anterior    Thromboembolus ([facility] [address]        Family History   Problem Relation Age of Onset   [facility] Arthritis-rheumatoid Mother     Dementia Mother     Heart Disease Father     Cancer Father     Hypertension Other     Cancer Brother        Current Outpatient Medications   Medication Sig Dispense Refill    gabapentin (NEURONTIN) 100 mg capsule Take 2 Capsules by mouth nightly. Max Daily Amount: 200 mg. 180 Capsule 0    allopurinoL (ZYLOPRIM) 100 mg tablet Take 1 Tablet by mouth two (2) times a day. 60 Tablet 0    enoxaparin (Lovenox) 30 mg/0.3 mL injection Inject one daily Sunday 11-7-21 am through Tues 11-9-21 am 3 Each 0    metaxalone (Skelaxin) 800 mg tablet Take 1 tab by mouth BID-TID prn muscle spasm 60 Tablet 2    diclofenac (VOLTAREN) 1 % gel Apply 4 g to affected area four (4) times daily. 500 g 3    L gasseri/B bifidum/B longum ([facility] [address]) Take  by mouth nightly.  lansoprazole (PREVACID) 30 mg capsule Take 30 mg by mouth daily.  warfarin (COUMADIN) 5 mg tablet TAKE 2 AND 1/2 TABLETS BY MOUTH DAILY OR AS DIRECTED (Patient taking differently: Take  by mouth daily. TAKE 2 AND 1/2 TABLETS BY MOUTH TUES, THURS, SAT or as directed) 75 Tab 0    lisinopril-hydroCHLOROthiazide (PRINZIDE, ZESTORETIC) 20-12.5 mg per tablet TAKE 1 TABLET BY MOUTH DAILY (Patient taking differently: Take 1 Tab by mouth daily.  TAKE 1 TABLET BY MOUTH DAILY) 90 Tab 1    labetalol (NORMODYNE) 100 mg tablet TAKE 1 TABLET BY MOUTH TWICE DAILY. STOP VERAPAMIL (Patient taking differently: Take  by mouth two (2) times a day.) 180 Tab 0    butalbital-acetaminophen-caff (FIORICET) -40 mg per capsule 2 cap three times per day as needed for headache (Patient taking differently: Take  by mouth daily. 2 cap three times per day as needed for headache) 180 Cap 1    ALPRAZolam (XANAX) 0.5 mg tablet Take one half(1/2) tab to one(1) tab by mouth at bedtime as needed for sleep (Patient taking differently: Take  by mouth nightly as needed. Take one half(1/2) tab to one(1) tab by mouth at bedtime as needed for sleep) 30 Tab 0    montelukast (SINGULAIR) 10 mg tablet TAKE 1 TABLET BY MOUTH EVERY DAY 90 Tab 0    acetaminophen (TYLENOL) 500 mg tablet Take 1,000 mg by mouth every six (6) hours as needed for Pain.  methylPREDNISolone (MEDROL DOSEPACK) 4 mg tablet Per dose pack instructions (Patient not taking: Reported on 12/6/2021) 1 Dose Pack 0       Allergies   Allergen Reactions    Amoxicillin Itching    Augmentin [Amoxicillin-Pot Clavulanate] Itching    Chlorhexidine Towelette Itching    Hibiclens [Chlorhexidine Gluconate] Itching    Milk Containing Products Diarrhea    Nsaids (Non-Steroidal Anti-Inflammatory Drug) Other (comments)     On blood thinner, contraindicated.      Penicillins Rash    Requip [Ropinirole] Nausea and Vomiting       Past Surgical History:   Procedure Laterality Date    FOOT/TOES SURGERY PROC UNLISTED      HX BACK SURGERY      HX HEENT Right 09/2018    eye surgery, macular     HX KNEE REPLACEMENT Left     HX ORTHOPAEDIC  06-25-12    Right foot with excision of bursa and adipose tissue from fifth metatarsal base by Dr. Camden Pace      lower back (1992 and 2000)    HX OTHER SURGICAL      left foot (2008)    HX OTHER SURGICAL      Retina repair     HX PROSTATECTOMY  11/2018    HX ROTATOR CUFF REPAIR Right 01/28/2019    by Dr. Tiffanie Bridges ARTHROSCOPY Left 12/2020    WORK RELATED INJURY    NERVE BLOCK      HI ANESTH,SURGERY OF SHOULDER      HI LAMINOTOMY         Social History     Socioeconomic History    Marital status:      Spouse name: Not on file    Number of children: Not on file    Years of education: Not on file    Highest education level: Not on file   Occupational History    Not on file   Tobacco Use    Smoking status: Never Smoker    Smokeless tobacco: Never Used   Vaping Use    Vaping Use: Never used   Substance and Sexual Activity    Alcohol use: No    Drug use: Never    Sexual activity: Not Currently   Other Topics Concern     Service Not Asked    Blood Transfusions Not Asked    Caffeine Concern Not Asked    Occupational Exposure Not Asked    Hobby Hazards Not Asked    Sleep Concern Not Asked    Stress Concern Not Asked    Weight Concern Not Asked    Special Diet Not Asked    Back Care Not Asked    Exercise Not Asked    Bike Helmet Not Asked    Seat Belt Not Asked    Self-Exams Not Asked   Social History Narrative    Not on file     Social Determinants of Health     Financial Resource Strain:     Difficulty of Paying Living Expenses: Not on file   Food Insecurity:     Worried About Running Out of Food in the Last Year: Not on file    Marley of Food in the Last Year: Not on file   Transportation Needs:     Lack of Transportation (Medical): Not on file    Lack of Transportation (Non-Medical):  Not on file   Physical Activity:     Days of Exercise per Week: Not on file    Minutes of Exercise per Session: Not on file   Stress:     Feeling of Stress : Not on file   Social Connections:     Frequency of Communication with Friends and Family: Not on file    Frequency of Social Gatherings with Friends and Family: Not on file    Attends Hindu Services: Not on file    Active Member of Clubs or Organizations: Not on file    Attends Club or Organization Meetings: Not on file    Marital Status: Not on file   Intimate Partner Violence:     Fear of Current or Ex-Partner: Not on file    Emotionally Abused: Not on file    Physically Abused: Not on file    Sexually Abused: Not on file   Housing Stability:     Unable to Pay for Housing in the Last Year: Not on file    Number of [place] in the Last Year: Not on file    Unstable Housing in the Last Year: Not on file       Visit Vitals  Pulse 65   Temp 97 °F (36.1 °C) (Temporal)   Resp 16   Ht 5' 10\" (1.778 m)   Wt 105.7 kg (233 lb)   SpO2 96%   BMI 33.43 kg/m²         PHYSICAL EXAMINATION:  GENERAL: Alert and oriented x3, in no acute distress, well-developed, well-nourished, afebrile. HEART: No JVD. EYES: No scleral icterus   NECK: No significant lymphadenopathy   LUNGS: No respiratory compromise or indrawing  ABDOMEN: Soft, non-tender, non-distended. Note: This note was completed using voice recognition software. Any typographical/name errors or mistakes are unintentional.    Electronically signed by:  Javid Lopez MD

## 2021-12-29 ENCOUNTER — OFFICE VISIT (OUTPATIENT)
Dept: ORTHOPEDIC SURGERY | Age: 66
End: 2021-12-29
Payer: MEDICARE

## 2021-12-29 VITALS
WEIGHT: 233 LBS | HEART RATE: 85 BPM | TEMPERATURE: 97.1 F | HEIGHT: 70 IN | BODY MASS INDEX: 33.36 KG/M2 | OXYGEN SATURATION: 98 %

## 2021-12-29 DIAGNOSIS — T84.84XS PAIN DUE TO TOTAL LEFT KNEE REPLACEMENT, SEQUELA: ICD-10-CM

## 2021-12-29 DIAGNOSIS — L08.9 SKIN INFECTION OF LEFT KNEE: Primary | ICD-10-CM

## 2021-12-29 DIAGNOSIS — Z96.652 PAIN DUE TO TOTAL LEFT KNEE REPLACEMENT, SEQUELA: ICD-10-CM

## 2021-12-29 PROCEDURE — G8536 NO DOC ELDER MAL SCRN: HCPCS | Performed by: PHYSICIAN ASSISTANT

## 2021-12-29 PROCEDURE — G8756 NO BP MEASURE DOC: HCPCS | Performed by: PHYSICIAN ASSISTANT

## 2021-12-29 PROCEDURE — G8427 DOCREV CUR MEDS BY ELIG CLIN: HCPCS | Performed by: PHYSICIAN ASSISTANT

## 2021-12-29 PROCEDURE — G8432 DEP SCR NOT DOC, RNG: HCPCS | Performed by: PHYSICIAN ASSISTANT

## 2021-12-29 PROCEDURE — G8417 CALC BMI ABV UP PARAM F/U: HCPCS | Performed by: PHYSICIAN ASSISTANT

## 2021-12-29 PROCEDURE — 99213 OFFICE O/P EST LOW 20 MIN: CPT | Performed by: PHYSICIAN ASSISTANT

## 2021-12-29 PROCEDURE — 1101F PT FALLS ASSESS-DOCD LE1/YR: CPT | Performed by: PHYSICIAN ASSISTANT

## 2021-12-29 PROCEDURE — 3017F COLORECTAL CA SCREEN DOC REV: CPT | Performed by: PHYSICIAN ASSISTANT

## 2021-12-29 NOTE — PROGRESS NOTES
[address] [facility], [address] [facility]  [phone]           Patient: Bob Luque                MRN: [id number]       SSN: [id number]  YOB: 1955        AGE: 74 y.o. SEX: male  Body mass index is 33.43 kg/m². PCP: Renetta Capone MD  12/29/21      This office note has been dictated. REVIEW OF SYSTEMS:  Constitutional: Negative for fever, chills, weight loss and malaise/fatigue. HENT: Negative. Eyes: Negative. Respiratory: Negative. Cardiovascular: Negative. Gastrointestinal: No bowel incontinence or constipation. Genitourinary: No bladder incontinence or saddle anesthesia. Skin: Negative. Neurological: Negative. Endo/Heme/Allergies: Negative. Psychiatric/Behavioral: Negative. Musculoskeletal: As per HPI above. Past Medical History:   Diagnosis Date    Arthritis     Bleeding     Chronic pain     knee and shoulder    DVT (deep venous thrombosis) ([facility] [address] 1992    post sx. R LEG    DVT (deep venous thrombosis) ([facility]) 2000    POST BACK SX. 2- LEFT LEG    GERD (gastroesophageal reflux disease)     Headache(784.0)     migraine    High cholesterol     Hypertension     Lower back pain 11/6/2010    Other chest pain     Personal history of prostate cancer     Pure hypercholesterolemia     Right buttock pain 11/6/2010    Right foot pain     Rotator cuff tear     left-since 2010, worsened tear Jan.    Rotator cuff tear, right     Spinal stenosis     Tendonitis, tibialis     anterior    Thromboembolus ([facility])          Current Outpatient Medications:     clotrimazole-betamethasone (LOTRISONE) 1-0.05 % lotion, Apply  to affected area two (2) times a day., Disp: 30 mL, Rfl: 0    gabapentin (NEURONTIN) 100 mg capsule, Take 2 Capsules by mouth nightly.  Max Daily Amount: 200 mg., Disp: 180 Capsule, Rfl: 0    allopurinoL (ZYLOPRIM) 100 mg tablet, Take 1 Tablet by mouth two (2) times a day., Disp: 60 Tablet, Rfl: 0    enoxaparin (Lovenox) 30 mg/0.3 mL injection, Inject one daily Sunday 11-7-21 am through Tues 11-9-21 am, Disp: 3 Each, Rfl: 0    metaxalone (Skelaxin) 800 mg tablet, Take 1 tab by mouth BID-TID prn muscle spasm, Disp: 60 Tablet, Rfl: 2    methylPREDNISolone (MEDROL DOSEPACK) 4 mg tablet, Per dose pack instructions, Disp: 1 Dose Pack, Rfl: 0    diclofenac (VOLTAREN) 1 % gel, Apply 4 g to affected area four (4) times daily. , Disp: 500 g, Rfl: 3    L gasseri/B bifidum/B longum (Zevia ), Take  by mouth nightly., Disp: , Rfl:     lansoprazole (PREVACID) 30 mg capsule, Take 30 mg by mouth daily. , Disp: , Rfl:     warfarin (COUMADIN) 5 mg tablet, TAKE 2 AND 1/2 TABLETS BY MOUTH DAILY OR AS DIRECTED (Patient taking differently: Take  by mouth daily. TAKE 2 AND 1/2 TABLETS BY MOUTH TUES, THURS, SAT or as directed), Disp: 75 Tab, Rfl: 0    lisinopril-hydroCHLOROthiazide (PRINZIDE, ZESTORETIC) 20-12.5 mg per tablet, TAKE 1 TABLET BY MOUTH DAILY (Patient taking differently: Take 1 Tab by mouth daily. TAKE 1 TABLET BY MOUTH DAILY), Disp: 90 Tab, Rfl: 1    labetalol (NORMODYNE) 100 mg tablet, TAKE 1 TABLET BY MOUTH TWICE DAILY. STOP VERAPAMIL (Patient taking differently: Take  by mouth two (2) times a day.), Disp: 180 Tab, Rfl: 0    butalbital-acetaminophen-caff (FIORICET) -40 mg per capsule, 2 cap three times per day as needed for headache (Patient taking differently: Take  by mouth daily. 2 cap three times per day as needed for headache), Disp: 180 Cap, Rfl: 1    ALPRAZolam (XANAX) 0.5 mg tablet, Take one half(1/2) tab to one(1) tab by mouth at bedtime as needed for sleep (Patient taking differently: Take  by mouth nightly as needed.  Take one half(1/2) tab to one(1) tab by mouth at bedtime as needed for sleep), Disp: 30 Tab, Rfl: 0    montelukast (SINGULAIR) 10 mg tablet, TAKE 1 TABLET BY MOUTH EVERY DAY, Disp: 90 Tab, Rfl: 0    acetaminophen (TYLENOL) 500 mg tablet, Take 1,000 mg by mouth every six (6) hours as needed for Pain., Disp: , Rfl:     Allergies   Allergen Reactions    Amoxicillin Itching    Augmentin [Amoxicillin-Pot Clavulanate] Itching    Chlorhexidine Towelette Itching    Hibiclens [Chlorhexidine Gluconate] Itching    Milk Containing Products Diarrhea    Nsaids (Non-Steroidal Anti-Inflammatory Drug) Other (comments)     On blood thinner, contraindicated.  Penicillins Rash    Requip [Ropinirole] Nausea and Vomiting       Social History     Socioeconomic History    Marital status:      Spouse name: Not on file    Number of children: Not on file    Years of education: Not on file    Highest education level: Not on file   Occupational History    Not on file   Tobacco Use    Smoking status: Never Smoker    Smokeless tobacco: Never Used   Vaping Use    Vaping Use: Never used   Substance and Sexual Activity    Alcohol use: No    Drug use: Never    Sexual activity: Not Currently   Other Topics Concern     Service Not Asked    Blood Transfusions Not Asked    Caffeine Concern Not Asked    Occupational Exposure Not Asked    Hobby Hazards Not Asked    Sleep Concern Not Asked    Stress Concern Not Asked    Weight Concern Not Asked    Special Diet Not Asked    Back Care Not Asked    Exercise Not Asked    Bike Helmet Not Asked    Seat Belt Not Asked    Self-Exams Not Asked   Social History Narrative    Not on file     Social Determinants of Health     Financial Resource Strain:     Difficulty of Paying Living Expenses: Not on file   Food Insecurity:     Worried About Running Out of Food in the Last Year: Not on file    Marley of Food in the Last Year: Not on file   Transportation Needs:     Lack of Transportation (Medical): Not on file    Lack of Transportation (Non-Medical):  Not on file   Physical Activity:     Days of Exercise per Week: Not on file    Minutes of Exercise per Session: Not on file   Stress:     Feeling of Stress : Not on file   Social Connections:     Frequency of Communication with Friends and Family: Not on file    Frequency of Social Gatherings with Friends and Family: Not on file    Attends Zoroastrianism Services: Not on file    Active Member of Clubs or Organizations: Not on file    Attends Club or Organization Meetings: Not on file    Marital Status: Not on file   Intimate Partner Violence:     Fear of Current or Ex-Partner: Not on file    Emotionally Abused: Not on file    Physically Abused: Not on file    Sexually Abused: Not on file   Housing Stability:     Unable to Pay for Housing in the Last Year: Not on file    Number of [place] in the Last Year: Not on file    Unstable Housing in the Last Year: Not on file       Past Surgical History:   Procedure Laterality Date    FOOT/TOES SURGERY PROC UNLISTED      HX BACK SURGERY      HX HEENT Right 09/2018    eye surgery, macular     HX KNEE REPLACEMENT Left     HX ORTHOPAEDIC  06-25-12    Right foot with excision of bursa and adipose tissue from fifth metatarsal base by Dr. Zev Javier      lower back (1992 and 2000)    HX OTHER SURGICAL      left foot (2008)    HX OTHER SURGICAL      Retina repair     HX PROSTATECTOMY  11/2018    HX ROTATOR CUFF REPAIR Right 01/28/2019    by Dr. Omar Austin ARTHROSCOPY Left 12/2020    WORK RELATED INJURY    NERVE BLOCK      CO ANESTH,SURGERY OF SHOULDER      CO LAMINOTOMY           Patient seen and evaluated today for his left knee. He is scheduled for revision surgery in second week in January. He did have some eczema on his knee and has been treated by Dr. Rayne Ruiz. Is been using his cream twice a day. Fortunately has improved considerably. He denies any itching. There have been no fevers or chills. No injuries to report. Patient denies recent fevers, chills, chest pain, SOB, or injuries. No recent systemic changes noted.   A 12-point review of systems is performed today.  Pertinent positives are noted. All other systems reviewed and otherwise are negative. Physical exam: General: Alert and oriented x3, nad.  well-developed, well nourished. normal affect, AF. NC/AT, EOMI, neck supple, trachea midline, no JVD present. Breathing is non-labored. Examination of the lower extremities with pain-free range of motion the hips. There is no pain to palpation the trochanter bursa PreNexa leg raise. Negative calf tenderness. Negative Homans. No signs of DVT present. Left knee reveals skin intact. There is no eczematous areas of concern today. No signs for infection. Mild edema distally. Assessment: Failed left knee replacement, eczema improved    Plan: At this point, he will continue with his cream twice a day. He will continue with his LORETO stockings and leg elevation. We will see him back in the office for preoperative history and physical.  He will call with any questions or concerns arise.               JR Willie GATES, ESPERANZA, ATC

## 2022-01-04 ENCOUNTER — HOSPITAL ENCOUNTER (OUTPATIENT)
Dept: LAB | Age: 67
Discharge: HOME | End: 2022-01-04
Payer: MEDICARE

## 2022-01-04 DIAGNOSIS — Z01.818 PREOPERATIVE TESTING: ICD-10-CM

## 2022-01-04 DIAGNOSIS — T84.093A FAILED TOTAL LEFT KNEE REPLACEMENT, INITIAL ENCOUNTER (HCC): ICD-10-CM

## 2022-01-04 LAB
ABO + RH BLD: NORMAL
ANION GAP SERPL CALC-SCNC: 7 MMOL/L (ref 3–18)
APPEARANCE UR: CLEAR
APTT PPP: 34.9 SEC (ref 23–36.4)
BASOPHILS # BLD: 0 K/UL (ref 0–0.1)
BASOPHILS NFR BLD: 1 % (ref 0–2)
BILIRUB UR QL: NEGATIVE
BLOOD GROUP ANTIBODIES SERPL: NORMAL
BUN SERPL-MCNC: 15 MG/DL (ref 7–18)
BUN/CREAT SERPL: 16 (ref 12–20)
CALCIUM SERPL-MCNC: 9.2 MG/DL (ref 8.5–10.1)
CHLORIDE SERPL-SCNC: 104 MMOL/L (ref 100–111)
CO2 SERPL-SCNC: 28 MMOL/L (ref 21–32)
COLOR UR: YELLOW
CREAT SERPL-MCNC: 0.96 MG/DL (ref 0.6–1.3)
DIFFERENTIAL METHOD BLD: ABNORMAL
EOSINOPHIL # BLD: 0.2 K/UL (ref 0–0.4)
EOSINOPHIL NFR BLD: 3 % (ref 0–5)
ERYTHROCYTE [DISTWIDTH] IN BLOOD BY AUTOMATED COUNT: 12.9 % (ref 11.6–14.5)
GLUCOSE SERPL-MCNC: 93 MG/DL (ref 74–99)
GLUCOSE UR STRIP.AUTO-MCNC: NEGATIVE MG/DL
HCT VFR BLD AUTO: 41.3 % (ref 36–48)
HGB BLD-MCNC: 13.7 G/DL (ref 13–16)
HGB UR QL STRIP: NEGATIVE
IMM GRANULOCYTES # BLD AUTO: 0 K/UL (ref 0–0.04)
IMM GRANULOCYTES NFR BLD AUTO: 1 % (ref 0–0.5)
INR PPP: 2 (ref 0.8–1.2)
KETONES UR QL STRIP.AUTO: NEGATIVE MG/DL
LEUKOCYTE ESTERASE UR QL STRIP.AUTO: NEGATIVE
LYMPHOCYTES # BLD: 1.2 K/UL (ref 0.9–3.6)
LYMPHOCYTES NFR BLD: 20 % (ref 21–52)
MCH RBC QN AUTO: 32.3 PG (ref 24–34)
MCHC RBC AUTO-ENTMCNC: 33.2 G/DL (ref 31–37)
MCV RBC AUTO: 97.4 FL (ref 78–100)
MONOCYTES # BLD: 0.8 K/UL (ref 0.05–1.2)
MONOCYTES NFR BLD: 14 % (ref 3–10)
NEUTS SEG # BLD: 3.6 K/UL (ref 1.8–8)
NEUTS SEG NFR BLD: 62 % (ref 40–73)
NITRITE UR QL STRIP.AUTO: NEGATIVE
NRBC # BLD: 0 K/UL (ref 0–0.01)
NRBC BLD-RTO: 0 PER 100 WBC
PH UR STRIP: 7 [PH] (ref 5–8)
PLATELET # BLD AUTO: 194 K/UL (ref 135–420)
PMV BLD AUTO: 10.5 FL (ref 9.2–11.8)
POTASSIUM SERPL-SCNC: 4.4 MMOL/L (ref 3.5–5.5)
PROT UR STRIP-MCNC: NEGATIVE MG/DL
PROTHROMBIN TIME: 22.6 SEC (ref 11.5–15.2)
RBC # BLD AUTO: 4.24 M/UL (ref 4.35–5.65)
SODIUM SERPL-SCNC: 139 MMOL/L (ref 136–145)
SP GR UR REFRACTOMETRY: 1.01 (ref 1–1.03)
SPECIMEN EXP DATE BLD: NORMAL
UROBILINOGEN UR QL STRIP.AUTO: 0.2 EU/DL (ref 0.2–1)
WBC # BLD AUTO: 5.9 K/UL (ref 4.6–13.2)

## 2022-01-04 PROCEDURE — 36415 COLL VENOUS BLD VENIPUNCTURE: CPT

## 2022-01-04 PROCEDURE — 81003 URINALYSIS AUTO W/O SCOPE: CPT

## 2022-01-04 PROCEDURE — 85610 PROTHROMBIN TIME: CPT

## 2022-01-04 PROCEDURE — 80048 BASIC METABOLIC PNL TOTAL CA: CPT

## 2022-01-04 PROCEDURE — 86900 BLOOD TYPING SEROLOGIC ABO: CPT

## 2022-01-04 PROCEDURE — 85025 COMPLETE CBC W/AUTO DIFF WBC: CPT

## 2022-01-04 PROCEDURE — 85730 THROMBOPLASTIN TIME PARTIAL: CPT

## 2022-01-04 PROCEDURE — 87086 URINE CULTURE/COLONY COUNT: CPT

## 2022-01-05 ENCOUNTER — OFFICE VISIT (OUTPATIENT)
Dept: ORTHOPEDIC SURGERY | Age: 67
End: 2022-01-05
Payer: MEDICARE

## 2022-01-05 VITALS
HEART RATE: 75 BPM | WEIGHT: 233 LBS | TEMPERATURE: 97.3 F | OXYGEN SATURATION: 98 % | DIASTOLIC BLOOD PRESSURE: 77 MMHG | SYSTOLIC BLOOD PRESSURE: 129 MMHG | BODY MASS INDEX: 33.36 KG/M2 | HEIGHT: 70 IN

## 2022-01-05 DIAGNOSIS — T84.093S FAILED TOTAL LEFT KNEE REPLACEMENT, SEQUELA: Primary | ICD-10-CM

## 2022-01-05 DIAGNOSIS — Z01.818 PRE-OPERATIVE EXAM: ICD-10-CM

## 2022-01-05 DIAGNOSIS — Z01.818 PREOPERATIVE TESTING: Primary | ICD-10-CM

## 2022-01-05 DIAGNOSIS — Z86.718 PERSONAL HISTORY OF VENOUS THROMBOSIS AND EMBOLISM: ICD-10-CM

## 2022-01-05 LAB
BACTERIA SPEC CULT: NORMAL
SERVICE CMNT-IMP: NORMAL

## 2022-01-05 PROCEDURE — G8427 DOCREV CUR MEDS BY ELIG CLIN: HCPCS | Performed by: PHYSICIAN ASSISTANT

## 2022-01-05 PROCEDURE — G8536 NO DOC ELDER MAL SCRN: HCPCS | Performed by: PHYSICIAN ASSISTANT

## 2022-01-05 PROCEDURE — G8752 SYS BP LESS 140: HCPCS | Performed by: PHYSICIAN ASSISTANT

## 2022-01-05 PROCEDURE — 3017F COLORECTAL CA SCREEN DOC REV: CPT | Performed by: PHYSICIAN ASSISTANT

## 2022-01-05 PROCEDURE — G8432 DEP SCR NOT DOC, RNG: HCPCS | Performed by: PHYSICIAN ASSISTANT

## 2022-01-05 PROCEDURE — 1101F PT FALLS ASSESS-DOCD LE1/YR: CPT | Performed by: PHYSICIAN ASSISTANT

## 2022-01-05 PROCEDURE — G8754 DIAS BP LESS 90: HCPCS | Performed by: PHYSICIAN ASSISTANT

## 2022-01-05 PROCEDURE — G8417 CALC BMI ABV UP PARAM F/U: HCPCS | Performed by: PHYSICIAN ASSISTANT

## 2022-01-05 PROCEDURE — 90000 NO LOS: CPT | Performed by: PHYSICIAN ASSISTANT

## 2022-01-05 RX ORDER — ENOXAPARIN SODIUM 100 MG/ML
INJECTION SUBCUTANEOUS
Qty: 5 EACH | Refills: 0 | Status: SHIPPED | OUTPATIENT
Start: 2022-01-05 | End: 2022-03-10

## 2022-01-05 RX ORDER — PREGABALIN 25 MG/1
75 CAPSULE ORAL ONCE
Status: CANCELLED | OUTPATIENT
Start: 2022-01-05 | End: 2022-01-05

## 2022-01-05 RX ORDER — DEXAMETHASONE SODIUM PHOSPHATE 4 MG/ML
8 INJECTION, SOLUTION INTRA-ARTICULAR; INTRALESIONAL; INTRAMUSCULAR; INTRAVENOUS; SOFT TISSUE
Status: CANCELLED | OUTPATIENT
Start: 2022-01-05 | End: 2022-01-05

## 2022-01-05 RX ORDER — ACETAMINOPHEN 325 MG/1
1000 TABLET ORAL ONCE
Status: CANCELLED | OUTPATIENT
Start: 2022-01-05 | End: 2022-01-05

## 2022-01-05 NOTE — H&P
[address] [facility], [address] [facility]  [phone]           HISTORY & PHYSICAL      Patient: Zander Reyes                MRN: [id number]       SSN: [id number]  YOB: 1955        AGE: 74 y.o. SEX: male  Body mass index is 33.43 kg/m². PCP: Marylee Russel, MD  01/05/22      CC: failed left TKA  Problem List Items Addressed This Visit        Other    Personal history of venous thrombosis and embolism      Other Visit Diagnoses     Failed total left knee replacement, sequela    -  Primary    Pre-operative exam                HPI:  The patient is a pleasant 74 y.o. whom had a previous TKA of their Left knee and has developed femoral loosening. He has been worked up for infection and fortunately negative. Due to the current findings and affected activities of daily living, surgical intervention is indicated. The alternatives, risks, complications, as well as expected outcome were discussed. These include but are not limited to infection, blood loss, need for blood transfusion, neurovascular damage, DVT, PE,  post-op stiffness and pain, leg length discrepancy, dislocation, anesthetic complications, prothesis longevity, need for more surgery, MI, stroke, and even death. The patient understands and wishes to proceed with surgery. Past Medical History:   Diagnosis Date    Arthritis     Bleeding     Chronic pain     knee and shoulder    DVT (deep venous thrombosis) ([address] 1992    post sx.   R LEG    DVT (deep venous thrombosis) ([facility]) 2000    POST BACK SX. 2- LEFT LEG    GERD (gastroesophageal reflux disease)     Headache(784.0)     migraine    High cholesterol     Hypertension     Lower back pain 11/6/2010    Other chest pain     Personal history of prostate cancer     Pure hypercholesterolemia     Right buttock pain 11/6/2010    Right foot pain     Rotator cuff tear     left-since 2010, worsened tear Jan.    Rotator cuff tear, right     Spinal stenosis     Tendonitis, tibialis     anterior    Thromboembolus (HCC)          Current Outpatient Medications:     clotrimazole-betamethasone (LOTRISONE) 1-0.05 % lotion, Apply  to affected area two (2) times a day., Disp: 30 mL, Rfl: 0    gabapentin (NEURONTIN) 100 mg capsule, Take 2 Capsules by mouth nightly. Max Daily Amount: 200 mg., Disp: 180 Capsule, Rfl: 0    allopurinoL (ZYLOPRIM) 100 mg tablet, Take 1 Tablet by mouth two (2) times a day., Disp: 60 Tablet, Rfl: 0    enoxaparin (Lovenox) 30 mg/0.3 mL injection, Inject one daily Sunday 11-7-21 am through Tues 11-9-21 am, Disp: 3 Each, Rfl: 0    metaxalone (Skelaxin) 800 mg tablet, Take 1 tab by mouth BID-TID prn muscle spasm, Disp: 60 Tablet, Rfl: 2    methylPREDNISolone (MEDROL DOSEPACK) 4 mg tablet, Per dose pack instructions, Disp: 1 Dose Pack, Rfl: 0    diclofenac (VOLTAREN) 1 % gel, Apply 4 g to affected area four (4) times daily. , Disp: 500 g, Rfl: 3    L gasseri/B bifidum/B longum (MCALLISTER [employer] PO), Take  by mouth nightly., Disp: , Rfl:     lansoprazole (PREVACID) 30 mg capsule, Take 30 mg by mouth daily. , Disp: , Rfl:     warfarin (COUMADIN) 5 mg tablet, TAKE 2 AND 1/2 TABLETS BY MOUTH DAILY OR AS DIRECTED (Patient taking differently: Take  by mouth daily. TAKE 2 AND 1/2 TABLETS BY MOUTH TUES, THURS, SAT or as directed), Disp: 75 Tab, Rfl: 0    lisinopril-hydroCHLOROthiazide (PRINZIDE, ZESTORETIC) 20-12.5 mg per tablet, TAKE 1 TABLET BY MOUTH DAILY (Patient taking differently: Take 1 Tab by mouth daily. TAKE 1 TABLET BY MOUTH DAILY), Disp: 90 Tab, Rfl: 1    labetalol (NORMODYNE) 100 mg tablet, TAKE 1 TABLET BY MOUTH TWICE DAILY.  STOP VERAPAMIL (Patient taking differently: Take  by mouth two (2) times a day.), Disp: 180 Tab, Rfl: 0    butalbital-acetaminophen-caff (FIORICET) -40 mg per capsule, 2 cap three times per day as needed for headache (Patient taking differently: Take  by mouth daily. 2 cap three times per day as needed for headache), Disp: 180 Cap, Rfl: 1    ALPRAZolam (XANAX) 0.5 mg tablet, Take one half(1/2) tab to one(1) tab by mouth at bedtime as needed for sleep (Patient taking differently: Take  by mouth nightly as needed. Take one half(1/2) tab to one(1) tab by mouth at bedtime as needed for sleep), Disp: 30 Tab, Rfl: 0    montelukast (SINGULAIR) 10 mg tablet, TAKE 1 TABLET BY MOUTH EVERY DAY, Disp: 90 Tab, Rfl: 0    acetaminophen (TYLENOL) 500 mg tablet, Take 1,000 mg by mouth every six (6) hours as needed for Pain., Disp: , Rfl:     Allergies   Allergen Reactions    Amoxicillin Itching    Augmentin [Amoxicillin-Pot Clavulanate] Itching    Chlorhexidine Towelette Itching    Hibiclens [Chlorhexidine Gluconate] Itching    Milk Containing Products Diarrhea    Nsaids (Non-Steroidal Anti-Inflammatory Drug) Other (comments)     On blood thinner, contraindicated.      Penicillins Rash    Requip [Ropinirole] Nausea and Vomiting       Social History     Socioeconomic History    Marital status:      Spouse name: Not on file    Number of children: Not on file    Years of education: Not on file    Highest education level: Not on file   Occupational History    Not on file   Tobacco Use    Smoking status: Never Smoker    Smokeless tobacco: Never Used   Vaping Use    Vaping Use: Never used   Substance and Sexual Activity    Alcohol use: No    Drug use: Never    Sexual activity: Not Currently   Other Topics Concern     Service Not Asked    Blood Transfusions Not Asked    Caffeine Concern Not Asked    Occupational Exposure Not Asked    Hobby Hazards Not Asked    Sleep Concern Not Asked    Stress Concern Not Asked    Weight Concern Not Asked    Special Diet Not Asked    Back Care Not Asked    Exercise Not Asked    Bike Helmet Not Asked   [address] Not Asked    Self-Exams Not Asked   Social History Narrative    Not on file     Social Determinants of Health     Financial Resource Strain:     Difficulty of Paying Living Expenses: Not on file   Food Insecurity:     Worried About Running Out of Food in the Last Year: Not on file    Marley of Food in the Last Year: Not on file   Transportation Needs:     Lack of Transportation (Medical): Not on file    Lack of Transportation (Non-Medical):  Not on file   Physical Activity:     Days of Exercise per Week: Not on file    Minutes of Exercise per Session: Not on file   Stress:     Feeling of Stress : Not on file   Social Connections:     Frequency of Communication with Friends and Family: Not on file    Frequency of Social Gatherings with Friends and Family: Not on file    Attends [employer] Services: Not on file    Active Member of [address] [place] [address] [employer] or Organizations: Not on file    Attends Club or Organization Meetings: Not on file    Marital Status: Not on file   Intimate Partner Violence:     Fear of Current or Ex-Partner: Not on file    Emotionally Abused: Not on file    Physically Abused: Not on file    Sexually Abused: Not on file   Housing Stability:     Unable to Pay for Housing in the Last Year: Not on file    Number of [place] in the Last Year: Not on file    Unstable Housing in the Last Year: Not on file       Past Surgical History:   Procedure Laterality Date    FOOT/TOES SURGERY PROC UNLISTED      HX BACK SURGERY      HX HEENT Right 09/2018    eye surgery, macular     HX KNEE REPLACEMENT Left     HX ORTHOPAEDIC  06-25-12    Right foot with excision of bursa and adipose tissue from fifth metatarsal base by Dr. Ruslan Soliman      lower back (1992 and 2000)    HX OTHER SURGICAL      left foot (2008)    HX OTHER SURGICAL      Retina repair     HX PROSTATECTOMY  11/2018    HX ROTATOR CUFF REPAIR Right 01/28/2019    by Dr. Thalia Farmer ARTHROSCOPY Left 12/2020    WORK RELATED INJURY    NERVE BLOCK      MN ANESTH,SURGERY OF SHOULDER      MN LAMINOTOMY Family History:  Non-contributory. PE:  Visit Vitals  /77 (BP 1 Location: Left arm, BP Patient Position: Sitting, BP Cuff Size: Large adult)   Pulse 75   Temp 97.3 °F (36.3 °C) (Temporal)   Ht 5' 10\" (1.778 m)   Wt 233 lb (105.7 kg)   SpO2 98%   BMI 33.43 kg/m²     A&O X3, NAD, well develop, well nourished  Heart: S1-S2, rrr  Lungs: CTA bilat  Abd: soft, nt, nt, + bs in all quadrants  Ext:  Pos distal pulses to DP, PT      X-ray: Radiographs of the left knee done previously including AP, lateral, skyline, 3 foot standing views confirms evidence of femoral loosening. The tibia looks to be well fixed. Labs: labs were reviewed and wnl.  ua neg    A:  Failed Left TKA    P:  At this point we will move forward with surgery. Again, the alternatives, risks, complications, as well as expected outcome were discussed and the patient wishes to proceed with surgery. Pt has been instructed to stop aspirin, nsaids, rheumatologic medications and blood thinners. They have also been instructed to continue on any heart and bp meds and to take them the morning of surgery with sips of water. The patient will require in-patient admission. Admission as an in-patient is reasonable and necessary due to increased risk of surgery due to the factors indicated as well as the possible need for prolonged in-hospital or skilled post-acute care in order to improve this patient's functional ability. The patient was counseled at length about the risks of braydon Covid-19 during their perioperative period and any recovery window from their procedure. The patient was made aware that braydon Covid-19  may worsen their prognosis for recovering from their procedure and lend to a higher morbidity and/or mortality risk. All material risks, benefits, and reasonable alternatives including postponing the procedure were discussed. The patient does  wish to proceed with the procedure at this time.           Bobby Trent ESPERANZA Solorzano Sa

## 2022-01-05 NOTE — PATIENT INSTRUCTIONS
Patient: Brie Hood             MRN: [id number]       YOB: 1955      AGE: 74 y.o. SEX: male  There is no height or weight on file to calculate BMI.    1/5/2022    DO:  1:  Sit with the leg out straight 5-10 min every hour while awake. Keep the toes pointed up towards the celia. 2.  Bend your knee 5-10 min every hour. Bend it to the point of pain and hold it for 5-10 Min. 3.  ICE your knee 20 min every hour    DO NOT:    1. Do not place anything under your knee to prop it up  2. Do not sit in the recliner chair.

## 2022-01-10 ENCOUNTER — TELEPHONE (OUTPATIENT)
Dept: ORTHOPEDIC SURGERY | Age: 67
End: 2022-01-10

## 2022-01-10 NOTE — TELEPHONE ENCOUNTER
Relayed PAs response regarding Lovenox. He understood, but does have other questions. He was placed on Allopurinol for gout and this ran out on 01/08/22. He is asking if he should be continuing this medication. He does still have pain, but he is unsure if it is due to gout. He was told to stop Voltaren Gel because of surgery, but now surgery has been cancelled. Can he resume taking this? He was given a hospital bracelet to wear when he got his labs drawn to wear until the date of surgery. Can he cut this off since surgery has been cancelled?     Please return call at [phone]

## 2022-01-10 NOTE — TELEPHONE ENCOUNTER
Patients Lt Knee revision for 1/14/22 has been canceled for now and patient would like to know if he should stop the Lovenox injections and go back on his coumadin If unable to reach him on his home phone please call him on his wife's cell phone at [phone].

## 2022-01-11 ENCOUNTER — OFFICE VISIT (OUTPATIENT)
Dept: ORTHOPEDIC SURGERY | Age: 67
End: 2022-01-11
Payer: OTHER MISCELLANEOUS

## 2022-01-11 VITALS — OXYGEN SATURATION: 98 % | WEIGHT: 234.4 LBS | HEART RATE: 85 BPM | TEMPERATURE: 98.2 F | BODY MASS INDEX: 33.63 KG/M2

## 2022-01-11 DIAGNOSIS — S46.012D TRAUMATIC COMPLETE TEAR OF LEFT ROTATOR CUFF, SUBSEQUENT ENCOUNTER: Primary | ICD-10-CM

## 2022-01-11 PROCEDURE — 99214 OFFICE O/P EST MOD 30 MIN: CPT | Performed by: ORTHOPAEDIC SURGERY

## 2022-01-11 RX ORDER — ALLOPURINOL 100 MG/1
100 TABLET ORAL 2 TIMES DAILY
Qty: 60 TABLET | Refills: 2 | Status: SHIPPED | OUTPATIENT
Start: 2022-01-11 | End: 2022-03-03 | Stop reason: CLARIF

## 2022-01-11 NOTE — TELEPHONE ENCOUNTER
Patient returned call. All of PAs responses were relayed verbatim. Patient had no other questions regarding these answers. For information only.

## 2022-01-11 NOTE — PROGRESS NOTES
Patient: Cindy Joseph                MRN: [id number]       SSN: [id number]  YOB: 1955        AGE: 74 y.o. SEX: male  Body mass index is 33.63 kg/m². PCP: Juan Meeks MD  01/11/22    Chief Complaint: Left shoulder follow up    HPI: Cindy Joseph is a 74 y.o. male patient who returns to the office today for his left shoulder. He is now over 1 year out from his left shoulder arthroscopic rotator cuff repair. He still has some occasional pain in the shoulder especially with weather changes. He is done with physical therapy. Past Medical History:   Diagnosis Date    Arthritis     Chronic pain     knee and shoulder    DVT (deep venous thrombosis) ([address] 1992    post sx. R LEG    [facility]) [facility] 2000    POST BACK SX. 2- LEFT LEG    GERD (gastroesophageal reflux disease)     Headache(784.0)     everyday    High cholesterol     Hypertension     [facility]     Pure hypercholesterolemia     Spinal stenosis     Thromboembolus ([facility]        Family History   Problem Relation Age of Onset   Xiao Bowling Arthritis-rheumatoid Mother     Dementia Mother     Heart Disease Father     Cancer Father     Hypertension Other     Cancer Brother        Current Outpatient Medications   Medication Sig Dispense Refill    allopurinoL (ZYLOPRIM) 100 mg tablet Take 1 Tablet by mouth two (2) times a day. 60 Tablet 2    enoxaparin (LOVENOX) 40 mg/0.4 mL Take 1 injection daily beginning on Sunday January 9th, 2022 and ending on Thursday January 13th, 2022. 5 Each 0    clotrimazole-betamethasone (LOTRISONE) 1-0.05 % lotion Apply  to affected area two (2) times a day. 30 mL 0    gabapentin (NEURONTIN) 100 mg capsule Take 2 Capsules by mouth nightly. Max Daily Amount: 200 mg. (Patient taking differently: Take 100 mg by mouth nightly.) 180 Capsule 0    allopurinoL (ZYLOPRIM) 100 mg tablet Take 1 Tablet by mouth two (2) times a day.  60 Tablet 0    metaxalone (Skelaxin) 800 mg tablet Take 1 tab by mouth BID-TID prn muscle spasm 60 Tablet 2    diclofenac (VOLTAREN) 1 % gel Apply 4 g to affected area four (4) times daily. 500 g 3    L gasseri/B bifidum/B longum (MCALLISTER' [address]) Take  by mouth nightly.  lansoprazole (PREVACID) 30 mg capsule Take 30 mg by mouth daily.  warfarin (COUMADIN) 5 mg tablet TAKE 2 AND 1/2 TABLETS BY MOUTH DAILY OR AS DIRECTED (Patient taking differently: Take  by mouth daily. TAKE 2 AND 1/2 TABLETS BY MOUTH TUES, THURS, SAT or as directed) 75 Tab 0    lisinopril-hydroCHLOROthiazide (PRINZIDE, ZESTORETIC) 20-12.5 mg per tablet TAKE 1 TABLET BY MOUTH DAILY (Patient taking differently: Take 1 Tab by mouth daily. TAKE 1 TABLET BY MOUTH DAILY) 90 Tab 1    labetalol (NORMODYNE) 100 mg tablet TAKE 1 TABLET BY MOUTH TWICE DAILY. STOP VERAPAMIL (Patient taking differently: Take  by mouth two (2) times a day.) 180 Tab 0    butalbital-acetaminophen-caff (FIORICET) -40 mg per capsule 2 cap three times per day as needed for headache (Patient taking differently: Take 1 Capsule by mouth daily. 2 cap three times per day as needed for headache) 180 Cap 1    ALPRAZolam (XANAX) 0.5 mg tablet Take one half(1/2) tab to one(1) tab by mouth at bedtime as needed for sleep (Patient taking differently: Take  by mouth nightly as needed. Take one half(1/2) tab to one(1) tab by mouth at bedtime as needed for sleep) 30 Tab 0    montelukast (SINGULAIR) 10 mg tablet TAKE 1 TABLET BY MOUTH EVERY DAY 90 Tab 0    acetaminophen (TYLENOL) 500 mg tablet Take 1,000 mg by mouth every six (6) hours as needed for Pain. Allergies   Allergen Reactions    Amoxicillin Itching    Augmentin [Amoxicillin-Pot Clavulanate] Itching    Chlorhexidine Towelette Itching    Hibiclens [Chlorhexidine Gluconate] Itching    Milk Containing Products Diarrhea    Nsaids (Non-Steroidal Anti-Inflammatory Drug) Other (comments)     On blood thinner, contraindicated.      Penicillins Rash    Requip [Ropinirole] Nausea and Vomiting       Past Surgical History:   Procedure Laterality Date    HX HEENT Right 09/11/2018    eye surgery, macular     HX KNEE REPLACEMENT Left 2018    HX LUMBAR LAMINECTOMY  1992    HX LUMBAR LAMINECTOMY  2000    HX ORTHOPAEDIC  06-25-12    Right foot with excision of bursa and adipose tissue from fifth metatarsal base by Dr. Anders Garcia HX PROSTATECTOMY  11/2018    HX ROTATOR CUFF REPAIR Right 01/28/2019    by Dr. Cabral Pel ARTHROSCOPY Left 12/2020    WORK RELATED INJURY       Social History     Socioeconomic History    Marital status:      Spouse name: Not on file    Number of children: Not on file    Years of education: Not on file    Highest education level: Not on file   Occupational History    Not on file   Tobacco Use    Smoking status: Never Smoker    Smokeless tobacco: Never Used   Vaping Use    Vaping Use: Never used   Substance and Sexual Activity    Alcohol use: No    Drug use: Never    Sexual activity: Not Currently   Other Topics Concern     Service Not Asked    Blood Transfusions Not Asked    Caffeine Concern Not Asked    Occupational Exposure Not Asked    Hobby Hazards Not Asked    Sleep Concern Not Asked    Stress Concern Not Asked    Weight Concern Not Asked    Special Diet Not Asked    Back Care Not Asked    Exercise Not Asked    Bike Helmet Not Asked   [address] Not Asked    Self-Exams Not Asked   Social History Narrative    Not on file     Social Determinants of Health     Financial Resource Strain:     Difficulty of Paying Living Expenses: Not on file   Food Insecurity:     Worried About Running Out of Food in the Last Year: Not on file    Marley of Food in the Last Year: Not on file   Transportation Needs:     Lack of Transportation (Medical): Not on file    Lack of Transportation (Non-Medical):  Not on file   Physical Activity:     Days of Exercise per Week: Not on file  Minutes of Exercise per Session: Not on file   Stress:     Feeling of Stress : Not on file   Social Connections:     Frequency of Communication with Friends and Family: Not on file    Frequency of Social Gatherings with Friends and Family: Not on file    Attends Denominational Services: Not on file    Active Member of [address] [place] [address] or Organizations: Not on file    Attends Club or Organization Meetings: Not on file    Marital Status: Not on file   Intimate Partner Violence:     Fear of Current or Ex-Partner: Not on file    Emotionally Abused: Not on file    Physically Abused: Not on file    Sexually Abused: Not on file   Housing Stability:     Unable to Pay for Housing in the Last Year: Not on file    Number of [place] in the Last Year: Not on file    Unstable Housing in the Last Year: Not on file       REVIEW OF SYSTEMS:      No changes from previous review of systems unless noted. PHYSICAL EXAMINATION:  Visit Vitals  Pulse 85   Temp 98.2 °F (36.8 °C) (Temporal)   Wt 234 lb 6.4 oz (106.3 kg)   SpO2 98%   BMI 33.63 kg/m²     Body mass index is 33.63 kg/m². GENERAL: Alert and oriented x3, in no acute distress. HEENT: Normocephalic, atraumatic. RESP: Non labored breathing. SKIN: No rashes or lesions noted. Shoulder Examination     R   L  ROM   FF  Full   Full  ER  Full   Full   IR  Full   Full  Rotator Cuff Pain   Supra  -   -   Infra  -   -   Subscap -   -  Crepitus  -   -  Effusion  -   -  Warmth  -   -   Erythema  -   -  Instability  -   -  AC Joint TTP  -   -  Clavicle   Deformity -   -   TTP  -   -  Proximal Humerus   Deformity -   -   TTP  -   -  Deltoid Strength 5   5  Biceps Strength 5   5  Biceps Deformity -   -  Biceps Groove Pain -   -  Impingement Sign -   -       IMAGING:  No imaging today    ASSESSMENT & PLAN  Diagnosis: Over 1 year out from left shoulder arthroscopic rotator cuff repair    Monalisa Urias is now over 1 year out from his left shoulder rotator cuff tear.   Unfortunately he is still having some issues with his left total knee replacement and has had to have his surgery rescheduled several times. I do think in regards to his left shoulder he is at the point of maximum medical improvement but do not think he is back to 100% and I would recommend a functional capacity evaluation and permanent partial disability rating for his left shoulder moving forward. I will hold off on ordering these right now as he is having significant issues with his left knee and is due for surgery coming up and I would like for him to get that before we put him through the test.  At this point the neck step would likely be to order those examinations and testing when he is somewhat recovered from his left knee surgery. Electronically signed by: Za Silva MD    Note: This note was completed using voice recognition software.   Any typographical/name errors or mistakes are unintentional.

## 2022-01-11 NOTE — TELEPHONE ENCOUNTER
Refill of allopurinol sent to pharmacy    [id number] [address] to resume voltaren gel    Ok to remove bracelet

## 2022-01-20 ENCOUNTER — APPOINTMENT (OUTPATIENT)
Dept: CT IMAGING | Age: 67
End: 2022-01-20
Attending: EMERGENCY MEDICINE
Payer: MEDICARE

## 2022-01-20 ENCOUNTER — HOSPITAL ENCOUNTER (EMERGENCY)
Age: 67
Discharge: HOME | End: 2022-01-20
Attending: EMERGENCY MEDICINE
Payer: MEDICARE

## 2022-01-20 VITALS
WEIGHT: 233 LBS | SYSTOLIC BLOOD PRESSURE: 154 MMHG | OXYGEN SATURATION: 99 % | BODY MASS INDEX: 33.36 KG/M2 | RESPIRATION RATE: 19 BRPM | TEMPERATURE: 98.5 F | HEART RATE: 89 BPM | DIASTOLIC BLOOD PRESSURE: 78 MMHG | HEIGHT: 70 IN

## 2022-01-20 DIAGNOSIS — S09.90XA INJURY OF HEAD, INITIAL ENCOUNTER: Primary | ICD-10-CM

## 2022-01-20 LAB
INR PPP: 2.1 (ref 0.8–1.2)
PROTHROMBIN TIME: 23.4 SEC (ref 11.5–15.2)

## 2022-01-20 PROCEDURE — 99282 EMERGENCY DEPT VISIT SF MDM: CPT

## 2022-01-20 PROCEDURE — 85610 PROTHROMBIN TIME: CPT

## 2022-01-20 PROCEDURE — 70450 CT HEAD/BRAIN W/O DYE: CPT

## 2022-01-20 RX ORDER — ONDANSETRON 4 MG/1
4 TABLET, FILM COATED ORAL
Qty: 12 TABLET | Refills: 0 | Status: SHIPPED | OUTPATIENT
Start: 2022-01-20 | End: 2022-04-20

## 2022-01-20 NOTE — ED TRIAGE NOTES
Pt states he hit his head on the shed last night. Pt c/o of a headache in the  Back of his head. Pt takes warfarin for blood clots he had a couple of years ago.

## 2022-01-20 NOTE — ED PROVIDER NOTES
EMERGENCY DEPARTMENT HISTORY AND PHYSICAL EXAM    4:13 PM patient seen at this time in room QA      Date: 1/20/2022  Patient Name: Carla Simon    History of Presenting Illness     Chief Complaint   Patient presents with    Head Injury         History Provided By: patient    Additional History (Context): Carla Simon is a 74 y.o. male presents with last night was exiting a storage shed and scraped the vertex of his head on the cross member this was about 5:30 PM yesterday. He is concerned because now he has rather intense pain in the back of his neck some nausea and he is on Coumadin. His last INR was 2.3. No focal neurologic deficits no problems with cranial nerves or vision certainly no double vision. Pain was 7 out of 10 on his arrival currently 5 out of 10. Moise Viera PCP: Noel Nieto MD    Chief Complaint:   Duration:    Timing:    Location:   Quality:   Severity:   Modifying Factors:   Associated Symptoms:       Current Outpatient Medications   Medication Sig Dispense Refill    allopurinoL (ZYLOPRIM) 100 mg tablet Take 1 Tablet by mouth two (2) times a day. 60 Tablet 2    enoxaparin (LOVENOX) 40 mg/0.4 mL Take 1 injection daily beginning on Sunday January 9th, 2022 and ending on Thursday January 13th, 2022. 5 Each 0    clotrimazole-betamethasone (LOTRISONE) 1-0.05 % lotion Apply  to affected area two (2) times a day. 30 mL 0    gabapentin (NEURONTIN) 100 mg capsule Take 2 Capsules by mouth nightly. Max Daily Amount: 200 mg. (Patient taking differently: Take 100 mg by mouth nightly.) 180 Capsule 0    allopurinoL (ZYLOPRIM) 100 mg tablet Take 1 Tablet by mouth two (2) times a day. 60 Tablet 0    metaxalone (Skelaxin) 800 mg tablet Take 1 tab by mouth BID-TID prn muscle spasm 60 Tablet 2    diclofenac (VOLTAREN) 1 % gel Apply 4 g to affected area four (4) times daily. 500 g 3    L gasseri/B bifidum/B longum ([facility] [address]) Take  by mouth nightly.       lansoprazole (PREVACID) 30 mg capsule Take 30 mg by mouth daily.  warfarin (COUMADIN) 5 mg tablet TAKE 2 AND 1/2 TABLETS BY MOUTH DAILY OR AS DIRECTED (Patient taking differently: Take  by mouth daily. TAKE 2 AND 1/2 TABLETS BY MOUTH TUES, THURS, SAT or as directed) 75 Tab 0    lisinopril-hydroCHLOROthiazide (PRINZIDE, ZESTORETIC) 20-12.5 mg per tablet TAKE 1 TABLET BY MOUTH DAILY (Patient taking differently: Take 1 Tab by mouth daily. TAKE 1 TABLET BY MOUTH DAILY) 90 Tab 1    labetalol (NORMODYNE) 100 mg tablet TAKE 1 TABLET BY MOUTH TWICE DAILY. STOP VERAPAMIL (Patient taking differently: Take  by mouth two (2) times a day.) 180 Tab 0    butalbital-acetaminophen-caff (FIORICET) -40 mg per capsule 2 cap three times per day as needed for headache (Patient taking differently: Take 1 Capsule by mouth daily. 2 cap three times per day as needed for headache) 180 Cap 1    ALPRAZolam (XANAX) 0.5 mg tablet Take one half(1/2) tab to one(1) tab by mouth at bedtime as needed for sleep (Patient taking differently: Take  by mouth nightly as needed. Take one half(1/2) tab to one(1) tab by mouth at bedtime as needed for sleep) 30 Tab 0    montelukast (SINGULAIR) 10 mg tablet TAKE 1 TABLET BY MOUTH EVERY DAY 90 Tab 0    acetaminophen (TYLENOL) 500 mg tablet Take 1,000 mg by mouth every six (6) hours as needed for Pain. Past History     Past Medical History:  Past Medical History:   Diagnosis Date    Arthritis     Chronic pain     knee and shoulder    DVT (deep venous thrombosis) ([address] 1992    post sx.   R LEG    [facility]) ([facility]) 2000    POST BACK SX. 2- LEFT LEG    GERD (gastroesophageal reflux disease)     Headache(784.0)     everyday    High cholesterol     Hypertension     [facility] [address] [address]     Pure hypercholesterolemia     Spinal stenosis     Thromboembolus [facility])        Past Surgical History:  Past Surgical History:   Procedure Laterality Date    HX HEENT Right 09/11/2018    eye surgery, macular     HX KNEE REPLACEMENT Left 2018    HX LUMBAR LAMINECTOMY  1992    HX LUMBAR LAMINECTOMY  2000    HX ORTHOPAEDIC  06-25-12    Right foot with excision of bursa and adipose tissue from fifth metatarsal base by Dr. Roseann Rajan HX PROSTATECTOMY  11/2018    HX ROTATOR CUFF REPAIR Right 01/28/2019    by Dr. Thalia Farmer ARTHROSCOPY Left 12/2020    WORK RELATED INJURY       Family History:  Family History   Problem Relation Age of Onset   Andreina Blank Arthritis-rheumatoid Mother     Dementia Mother     Heart Disease Father     Cancer Father     Hypertension Other     Cancer Brother        Social History:  Social History     Tobacco Use    Smoking status: Never Smoker    Smokeless tobacco: Never Used   Vaping Use    Vaping Use: Never used   Substance Use Topics    Alcohol use: No    Drug use: Never       Allergies: Allergies   Allergen Reactions    Amoxicillin Itching    Augmentin [Amoxicillin-Pot Clavulanate] Itching    Chlorhexidine Towelette Itching    Hibiclens [Chlorhexidine Gluconate] Itching    Milk Containing Products Diarrhea    Nsaids (Non-Steroidal Anti-Inflammatory Drug) Other (comments)     On blood thinner, contraindicated.  Penicillins Rash    Requip [Ropinirole] Nausea and Vomiting         Review of Systems     Review of Systems   Constitutional: Negative for diaphoresis and fever. HENT: Negative for congestion and sore throat. Eyes: Negative for pain and itching. Respiratory: Negative for cough and shortness of breath. Cardiovascular: Negative for chest pain and palpitations. Gastrointestinal: Negative for abdominal pain and diarrhea. Endocrine: Negative for polydipsia and polyuria. Genitourinary: Negative for dysuria and hematuria. Musculoskeletal: Negative for arthralgias and myalgias. Skin: Negative for rash and wound. Neurological: Positive for headaches. Negative for seizures and syncope. Hematological: Does not bruise/bleed easily.    Psychiatric/Behavioral: Negative for agitation and hallucinations. Physical Exam       Patient Vitals for the past 12 hrs:   Temp Pulse Resp BP SpO2   01/20/22 1458 98.5 °F (36.9 °C) 89 19 (!) 154/78 99 %       IPVITALS  Patient Vitals for the past 24 hrs:   BP Temp Pulse Resp SpO2 Height Weight   01/20/22 1458 (!) 154/78 98.5 °F (36.9 °C) 89 19 99 % 5' 10\" (1.778 m) 105.7 kg (233 lb)       Physical Exam  Vitals and nursing note reviewed. Constitutional:       General: He is not in acute distress. Appearance: Normal appearance. He is well-developed. HENT:      Head: Normocephalic and atraumatic. Eyes:      General: No scleral icterus. Extraocular Movements: Extraocular movements intact. Conjunctiva/sclera: Conjunctivae normal.      Pupils: Pupils are equal, round, and reactive to light. Neck:      Vascular: No JVD. Cardiovascular:      Rate and Rhythm: Normal rate and regular rhythm. Pulmonary:      Effort: Pulmonary effort is normal. No respiratory distress. Musculoskeletal:         General: Normal range of motion. Cervical back: Normal range of motion and neck supple. Skin:     General: Skin is warm and dry. Neurological:      General: No focal deficit present. Mental Status: He is alert. Cranial Nerves: No cranial nerve deficit. Psychiatric:         Thought Content: Thought content normal.         Judgment: Judgment normal.           Diagnostic Study Results   Labs -  No results found for this or any previous visit (from the past 12 hour(s)). Radiologic Studies -   CT HEAD WO CONT   Final Result   No acute intracranial abnormality. CT HEAD WO CONT    Result Date: 1/20/2022  CT of the head without contrast HISTORY: On blood thinners. Hit head. Headache. COMPARISON: CT head 9/30/2009 TECHNIQUE: Axial images of the brain were obtained, without the administration of intravenous contrast. Coronal and sagittal reconstructions were generated.  All CT scans at this facility are performed using dose optimization technique as appropriate to a performed exam, to include automated exposure control, adjustment of the MA and/or kV according to patient size (including appropriate matching for site-specific examinations) or use of  iterative reconstruction technique. FINDINGS: Brain: No acute intracranial hemorrhage. No mass effect or midline shift. Trace low attenuation involving periventricular and subcortical white matter, a nonspecific finding which is most commonly encountered in the setting of very mild chronic microvascular disease. The gray-white matter differentiations are preserved. Paranasal sinuses and mastoid air cells: Probable mucus retention cyst right sphenoid sinus. Mastoid air cells are clear. Calvarium: No acute fracture. No acute intracranial abnormality. Medications ordered:   Medications - No data to display      Medical Decision Making   Initial Medical Decision Making and DDx:  Negative CT of the head and clinically no signs of stroke. Awaiting results of his PT and INR anticipate uneventful discharge. Supportive management OTC medications. Questions answered he is happy with this plan. ED Course: Progress Notes, Reevaluation, and Consults:         I am the first provider for this patient. I reviewed the vital signs, available nursing notes, past medical history, past surgical history, family history and social history. Patient Vitals for the past 12 hrs:   Temp Pulse Resp BP SpO2   01/20/22 1458 98.5 °F (36.9 °C) 89 19 (!) 154/78 99 %       Vital Signs-Reviewed the patient's vital signs. Pulse Oximetry Analysis, Cardiac Monitor, 12 lead ekg:      Interpreted by the EP. Records Reviewed: Nursing notes reviewed (Time of Review: 4:13 PM)    Procedures:   Critical Care Time:   Aspirin: (was aspirin given for stroke?)    Diagnosis     Clinical Impression:   1.  Injury of head, initial encounter        Disposition: Discharged      Follow-up Information    None          Patient's Medications   Start Taking    No medications on file   Continue Taking    ACETAMINOPHEN (TYLENOL) 500 MG TABLET    Take 1,000 mg by mouth every six (6) hours as needed for Pain. ALLOPURINOL (ZYLOPRIM) 100 MG TABLET    Take 1 Tablet by mouth two (2) times a day. ALLOPURINOL (ZYLOPRIM) 100 MG TABLET    Take 1 Tablet by mouth two (2) times a day. ALPRAZOLAM (XANAX) 0.5 MG TABLET    Take one half(1/2) tab to one(1) tab by mouth at bedtime as needed for sleep    BUTALBITAL-ACETAMINOPHEN-CAFF (FIORICET) -40 MG PER CAPSULE    2 cap three times per day as needed for headache    CLOTRIMAZOLE-BETAMETHASONE (LOTRISONE) 1-0.05 % LOTION    Apply  to affected area two (2) times a day. DICLOFENAC (VOLTAREN) 1 % GEL    Apply 4 g to affected area four (4) times daily. ENOXAPARIN (LOVENOX) 40 MG/0.4 ML    Take 1 injection daily beginning on Sunday January 9th, 2022 and ending on Thursday January 13th, 2022. GABAPENTIN (NEURONTIN) 100 MG CAPSULE    Take 2 Capsules by mouth nightly. Max Daily Amount: 200 mg.    L GASSERI/B BIFIDUM/B LONGUM ([facility] [address])    Take  by mouth nightly. LABETALOL (NORMODYNE) 100 MG TABLET    TAKE 1 TABLET BY MOUTH TWICE DAILY. STOP VERAPAMIL    LANSOPRAZOLE (PREVACID) 30 MG CAPSULE    Take 30 mg by mouth daily.     LISINOPRIL-HYDROCHLOROTHIAZIDE (PRINZIDE, ZESTORETIC) 20-12.5 MG PER TABLET    TAKE 1 TABLET BY MOUTH DAILY    METAXALONE (SKELAXIN) 800 MG TABLET    Take 1 tab by mouth BID-TID prn muscle spasm    MONTELUKAST (SINGULAIR) 10 MG TABLET    TAKE 1 TABLET BY MOUTH EVERY DAY    WARFARIN (COUMADIN) 5 MG TABLET    TAKE 2 AND 1/2 TABLETS BY MOUTH DAILY OR AS DIRECTED   These Medications have changed    No medications on file   Stop Taking    No medications on file     _______________________________    Notes:    Last Gong MD using Dragon dictation      _______________________________

## 2022-02-05 ENCOUNTER — APPOINTMENT (OUTPATIENT)
Dept: GENERAL RADIOLOGY | Age: 67
End: 2022-02-05
Attending: STUDENT IN AN ORGANIZED HEALTH CARE EDUCATION/TRAINING PROGRAM
Payer: MEDICARE

## 2022-02-05 ENCOUNTER — HOSPITAL ENCOUNTER (EMERGENCY)
Age: 67
Discharge: HOME | End: 2022-02-05
Attending: STUDENT IN AN ORGANIZED HEALTH CARE EDUCATION/TRAINING PROGRAM
Payer: MEDICARE

## 2022-02-05 VITALS
RESPIRATION RATE: 18 BRPM | TEMPERATURE: 98.2 F | BODY MASS INDEX: 33.36 KG/M2 | HEART RATE: 78 BPM | DIASTOLIC BLOOD PRESSURE: 89 MMHG | HEIGHT: 70 IN | OXYGEN SATURATION: 97 % | WEIGHT: 233 LBS | SYSTOLIC BLOOD PRESSURE: 132 MMHG

## 2022-02-05 DIAGNOSIS — J20.9 ACUTE BRONCHITIS, UNSPECIFIED ORGANISM: Primary | ICD-10-CM

## 2022-02-05 PROCEDURE — 71045 X-RAY EXAM CHEST 1 VIEW: CPT

## 2022-02-05 PROCEDURE — 99282 EMERGENCY DEPT VISIT SF MDM: CPT

## 2022-02-05 RX ORDER — BENZONATATE 100 MG/1
100 CAPSULE ORAL
Qty: 30 CAPSULE | Refills: 0 | Status: SHIPPED | OUTPATIENT
Start: 2022-02-05 | End: 2022-02-12

## 2022-02-05 NOTE — DISCHARGE INSTRUCTIONS
Continue Mucinex. Take the [facility] as needed for your cough. Expect this to go on for the next week or so. Please make a follow-up appointment with your primary care doctor. Return for any new or worsening symptoms.

## 2022-02-05 NOTE — ED PROVIDER NOTES
EMERGENCY DEPARTMENT HISTORY AND PHYSICAL EXAM    I have evaluated the patient at 1:27 PM      Date: 2/5/2022  Patient Name: Waqar Storey    History of Presenting Illness     Chief Complaint   Patient presents with    Cough    Concern For COVID-19 (Coronavirus)         History Provided By: Patient  Location/Duration/Severity/Modifying factors   67-year-old male with history of arthritis hypertension hypercholesterolemia presenting to the emergency department for evaluation of persistent cough. States that him and his wife were infected with what he thinks is COVID-19 last week. He was experiencing fevers, chills, aches, and cough. States that those symptoms have subsided but the cough is lingering. Reports a dry cough. Has been taking Mucinex without relief. Denies chest pain or shortness of breath          PCP: Garima Root MD    Current Outpatient Medications   Medication Sig Dispense Refill    benzonatate (Tessalon Perles) 100 mg capsule Take 1 Capsule by mouth three (3) times daily as needed for Cough for up to 7 days. 30 Capsule 0    ondansetron hcl (Zofran) 4 mg tablet Take 1 Tablet by mouth every eight (8) hours as needed for Nausea. 12 Tablet 0    allopurinoL (ZYLOPRIM) 100 mg tablet Take 1 Tablet by mouth two (2) times a day. 60 Tablet 2    enoxaparin (LOVENOX) 40 mg/0.4 mL Take 1 injection daily beginning on Sunday January 9th, 2022 and ending on Thursday January 13th, 2022. 5 Each 0    clotrimazole-betamethasone (LOTRISONE) 1-0.05 % lotion Apply  to affected area two (2) times a day. 30 mL 0    gabapentin (NEURONTIN) 100 mg capsule Take 2 Capsules by mouth nightly. Max Daily Amount: 200 mg. (Patient taking differently: Take 100 mg by mouth nightly.) 180 Capsule 0    allopurinoL (ZYLOPRIM) 100 mg tablet Take 1 Tablet by mouth two (2) times a day.  60 Tablet 0    metaxalone (Skelaxin) 800 mg tablet Take 1 tab by mouth BID-TID prn muscle spasm 60 Tablet 2    diclofenac (VOLTAREN) 1 % gel Apply 4 g to affected area four (4) times daily. 500 g 3    L gasseri/B bifidum/B longum ([facility] [address]) Take  by mouth nightly.  lansoprazole (PREVACID) 30 mg capsule Take 30 mg by mouth daily.  warfarin (COUMADIN) 5 mg tablet TAKE 2 AND 1/2 TABLETS BY MOUTH DAILY OR AS DIRECTED (Patient taking differently: Take  by mouth daily. TAKE 2 AND 1/2 TABLETS BY MOUTH TUES, THURS, SAT or as directed) 75 Tab 0    lisinopril-hydroCHLOROthiazide (PRINZIDE, ZESTORETIC) 20-12.5 mg per tablet TAKE 1 TABLET BY MOUTH DAILY (Patient taking differently: Take 1 Tab by mouth daily. TAKE 1 TABLET BY MOUTH DAILY) 90 Tab 1    labetalol (NORMODYNE) 100 mg tablet TAKE 1 TABLET BY MOUTH TWICE DAILY. STOP VERAPAMIL (Patient taking differently: Take  by mouth two (2) times a day.) 180 Tab 0    butalbital-acetaminophen-caff (FIORICET) -40 mg per capsule 2 cap three times per day as needed for headache (Patient taking differently: Take 1 Capsule by mouth daily. 2 cap three times per day as needed for headache) 180 Cap 1    ALPRAZolam (XANAX) 0.5 mg tablet Take one half(1/2) tab to one(1) tab by mouth at bedtime as needed for sleep (Patient taking differently: Take  by mouth nightly as needed. Take one half(1/2) tab to one(1) tab by mouth at bedtime as needed for sleep) 30 Tab 0    montelukast (SINGULAIR) 10 mg tablet TAKE 1 TABLET BY MOUTH EVERY DAY 90 Tab 0    acetaminophen (TYLENOL) 500 mg tablet Take 1,000 mg by mouth every six (6) hours as needed for Pain. Past History     Past Medical History:  Past Medical History:   Diagnosis Date    Arthritis     Chronic pain     knee and shoulder    DVT (deep venous thrombosis) ([address] 1992    post sx.   R LEG    DVT (deep venous thrombosis) ([facility]) 2000    POST BACK SX. 2- LEFT LEG    GERD (gastroesophageal reflux disease)     Headache(784.0)     everyday    High cholesterol     Hypertension     Prostate [facility] [address]     Pure hypercholesterolemia     Spinal stenosis     Thromboembolus [facility])        Past Surgical History:  Past Surgical History:   Procedure Laterality Date    HX HEENT Right 09/11/2018    eye surgery, macular     HX KNEE REPLACEMENT Left 2018    HX LUMBAR LAMINECTOMY  1992    HX LUMBAR LAMINECTOMY  2000    HX ORTHOPAEDIC  06-25-12    Right foot with excision of bursa and adipose tissue from fifth metatarsal base by Dr. Babs Brito HX PROSTATECTOMY  11/2018    HX ROTATOR CUFF REPAIR Right 01/28/2019    by Dr. Jr Sierra ARTHROSCOPY Left 12/2020    WORK RELATED INJURY       Family History:  Family History   Problem Relation Age of Onset   Maryan Humphrey Arthritis-rheumatoid Mother     Dementia Mother     Heart Disease Father     Cancer Father     Hypertension Other     Cancer Brother        Social History:  Social History     Tobacco Use    Smoking status: Never Smoker    Smokeless tobacco: Never Used   Vaping Use    Vaping Use: Never used   Substance Use Topics    Alcohol use: No    Drug use: Never       Allergies: Allergies   Allergen Reactions    Amoxicillin Itching    Augmentin [Amoxicillin-Pot Clavulanate] Itching    Chlorhexidine Towelette Itching    Hibiclens [Chlorhexidine Gluconate] Itching    Milk Containing Products Diarrhea    Nsaids (Non-Steroidal Anti-Inflammatory Drug) Other (comments)     On blood thinner, contraindicated.  Penicillins Rash    Requip [Ropinirole] Nausea and Vomiting         Review of Systems       Review of Systems   Constitutional: Negative for activity change, chills, diaphoresis, fatigue and fever. HENT: Negative for congestion, ear pain, sinus pain and sore throat. Respiratory: Positive for cough. Negative for chest tightness, shortness of breath, wheezing and stridor. Cardiovascular: Negative for chest pain and palpitations. Gastrointestinal: Negative for abdominal distention, abdominal pain, constipation, diarrhea, nausea and vomiting.    Genitourinary: Negative for difficulty urinating, dysuria and hematuria. Musculoskeletal: Negative for back pain, joint swelling and myalgias. Skin: Negative for rash. Neurological: Negative for dizziness, weakness and headaches. Psychiatric/Behavioral: Negative for agitation. The patient is not nervous/anxious. Physical Exam     Visit Vitals  /89 (BP 1 Location: Left upper arm, BP Patient Position: At rest)   Pulse 78   Temp 98.2 °F (36.8 °C)   Resp 18   Ht 5' 10\" (1.778 m)   Wt 105.7 kg (233 lb)   SpO2 97%   BMI 33.43 kg/m²         Physical Exam  Constitutional:       General: He is not in acute distress. Appearance: He is not toxic-appearing. HENT:      Head: Normocephalic and atraumatic. Mouth/Throat:      Mouth: Mucous membranes are moist.   Eyes:      Extraocular Movements: Extraocular movements intact. Pupils: Pupils are equal, round, and reactive to light. Cardiovascular:      Rate and Rhythm: Normal rate and regular rhythm. Heart sounds: Normal heart sounds. No murmur heard. No friction rub. No gallop. Pulmonary:      Effort: Pulmonary effort is normal.      Breath sounds: Normal breath sounds. No wheezing or rhonchi. Abdominal:      General: There is no distension. Palpations: Abdomen is soft. There is no mass. Tenderness: There is no abdominal tenderness. There is no guarding. Hernia: No hernia is present. Musculoskeletal:         General: No swelling, tenderness or deformity. Cervical back: Normal range of motion and neck supple. Skin:     General: Skin is warm and dry. Findings: No rash. Neurological:      General: No focal deficit present. Mental Status: He is alert and oriented to person, place, and time. Psychiatric:         Mood and Affect: Mood normal.           Diagnostic Study Results     Labs -  No results found for this or any previous visit (from the past 12 hour(s)).     Radiologic Studies -   XR CHEST PORT   Final Result   No acute findings. If symptoms persist consider CT. Medical Decision Making   I am the first provider for this patient. I reviewed the vital signs, available nursing notes, past medical history, past surgical history, family history and social history. Vital Signs-Reviewed the patient's vital signs. Records Reviewed: Nursing Notes and Old Medical Records (Time of Review: 1:27 PM)    ED Course: Progress Notes, Reevaluation, and Consults:         Provider Notes (Medical Decision Making):   MDM  Number of Diagnoses or Management Options  Acute bronchitis, unspecified organism  Diagnosis management comments: Patient is overall well-appearing. Vital signs stable. 97% on room air. Afebrile. Chest x-ray obtained which is negative for infiltrate. Lungs are clear to auscultation. Patient likely has residual bronchitis from his viral illness approximately 1 week ago. Will prescribe Tessalon Perles. Recommend to continue Mucinex. Instructed him to follow-up with his primary care doctor. Discussed return precautions. Patient verbalizes good understanding and agreement with plan. Diagnosis     Clinical Impression:   1. Acute bronchitis, unspecified organism        Disposition: home    Follow-up Information     Follow up With Specialties Details [address] [place] [id number] EMERGENCY DEPT Emergency Medicine  As needed, If symptoms worsen [phone]    Eda Garcia MD Internal Medicine Call   [address] [address]  [phone]             Patient's Medications   Start Taking    BENZONATATE (TESSALON PERLES) 100 MG CAPSULE    Take 1 Capsule by mouth three (3) times daily as needed for Cough for up to 7 days. Continue Taking    ACETAMINOPHEN (TYLENOL) 500 MG TABLET    Take 1,000 mg by mouth every six (6) hours as needed for Pain. ALLOPURINOL (ZYLOPRIM) 100 MG TABLET    Take 1 Tablet by mouth two (2) times a day. ALLOPURINOL (ZYLOPRIM) 100 MG TABLET    Take 1 Tablet by mouth two (2) times a day. ALPRAZOLAM (XANAX) 0.5 MG TABLET    Take one half(1/2) tab to one(1) tab by mouth at bedtime as needed for sleep    BUTALBITAL-ACETAMINOPHEN-CAFF (FIORICET) -40 MG PER CAPSULE    2 cap three times per day as needed for headache    CLOTRIMAZOLE-BETAMETHASONE (LOTRISONE) 1-0.05 % LOTION    Apply  to affected area two (2) times a day. DICLOFENAC (VOLTAREN) 1 % GEL    Apply 4 g to affected area four (4) times daily. ENOXAPARIN (LOVENOX) 40 MG/0.4 ML    Take 1 injection daily beginning on Sunday January 9th, 2022 and ending on Thursday January 13th, 2022. GABAPENTIN (NEURONTIN) 100 MG CAPSULE    Take 2 Capsules by mouth nightly. Max Daily Amount: 200 mg.    L GASSERI/B BIFIDUM/B LONGUM ([facility] [address])    Take  by mouth nightly. LABETALOL (NORMODYNE) 100 MG TABLET    TAKE 1 TABLET BY MOUTH TWICE DAILY. STOP VERAPAMIL    LANSOPRAZOLE (PREVACID) 30 MG CAPSULE    Take 30 mg by mouth daily. LISINOPRIL-HYDROCHLOROTHIAZIDE (PRINZIDE, ZESTORETIC) 20-12.5 MG PER TABLET    TAKE 1 TABLET BY MOUTH DAILY    METAXALONE (SKELAXIN) 800 MG TABLET    Take 1 tab by mouth BID-TID prn muscle spasm    MONTELUKAST (SINGULAIR) 10 MG TABLET    TAKE 1 TABLET BY MOUTH EVERY DAY    ONDANSETRON HCL (ZOFRAN) 4 MG TABLET    Take 1 Tablet by mouth every eight (8) hours as needed for Nausea. WARFARIN (COUMADIN) 5 MG TABLET    TAKE 2 AND 1/2 TABLETS BY MOUTH DAILY OR AS DIRECTED   These Medications have changed    No medications on file   Stop Taking    No medications on file     Disclaimer: Sections of this note are dictated using utilizing voice recognition software. Minor typographical errors may be present. If questions arise, please do not hesitate to contact me or call our department.

## 2022-02-05 NOTE — ED TRIAGE NOTES
Patient c/o having persistent cough since January 26, 2022. He states exposure to family member that was positive for Covid. Voices concerns for possible UTI related to recent bladder pain.

## 2022-02-18 ENCOUNTER — TRANSCRIBE ORDER (OUTPATIENT)
Dept: SCHEDULING | Age: 67
End: 2022-02-18

## 2022-02-18 DIAGNOSIS — R05.9 COUGH: ICD-10-CM

## 2022-02-18 DIAGNOSIS — J20.9 ACUTE BRONCHITIS: Primary | ICD-10-CM

## 2022-02-21 DIAGNOSIS — Z01.818 PREOPERATIVE TESTING: Primary | ICD-10-CM

## 2022-02-22 ENCOUNTER — TRANSCRIBE ORDER (OUTPATIENT)
Dept: SCHEDULING | Age: 67
End: 2022-02-22

## 2022-02-22 DIAGNOSIS — R05.9 COUGH: Primary | ICD-10-CM

## 2022-02-25 ENCOUNTER — HOSPITAL ENCOUNTER (OUTPATIENT)
Dept: PREADMISSION TESTING | Age: 67
Discharge: HOME | End: 2022-02-25
Payer: MEDICARE

## 2022-02-25 DIAGNOSIS — Z01.818 PREOPERATIVE TESTING: ICD-10-CM

## 2022-02-25 LAB
ABO + RH BLD: NORMAL
ANION GAP SERPL CALC-SCNC: 5 MMOL/L (ref 3–18)
APPEARANCE UR: CLEAR
APTT PPP: 44.8 SEC (ref 23–36.4)
BASOPHILS # BLD: 0 K/UL (ref 0–0.1)
BASOPHILS NFR BLD: 1 % (ref 0–2)
BILIRUB UR QL: NEGATIVE
BLOOD GROUP ANTIBODIES SERPL: NORMAL
BUN SERPL-MCNC: 10 MG/DL (ref 7–18)
BUN/CREAT SERPL: 10 (ref 12–20)
CALCIUM SERPL-MCNC: 9 MG/DL (ref 8.5–10.1)
CHLORIDE SERPL-SCNC: 105 MMOL/L (ref 100–111)
CO2 SERPL-SCNC: 29 MMOL/L (ref 21–32)
COLOR UR: YELLOW
CREAT SERPL-MCNC: 1.02 MG/DL (ref 0.6–1.3)
DIFFERENTIAL METHOD BLD: ABNORMAL
EOSINOPHIL # BLD: 0.1 K/UL (ref 0–0.4)
EOSINOPHIL NFR BLD: 2 % (ref 0–5)
ERYTHROCYTE [DISTWIDTH] IN BLOOD BY AUTOMATED COUNT: 13 % (ref 11.6–14.5)
GLUCOSE SERPL-MCNC: 136 MG/DL (ref 74–99)
GLUCOSE UR STRIP.AUTO-MCNC: NEGATIVE MG/DL
HCT VFR BLD AUTO: 39.7 % (ref 36–48)
HGB BLD-MCNC: 12.9 G/DL (ref 13–16)
HGB UR QL STRIP: NEGATIVE
IMM GRANULOCYTES # BLD AUTO: 0 K/UL (ref 0–0.04)
IMM GRANULOCYTES NFR BLD AUTO: 1 % (ref 0–0.5)
INR PPP: 3.6 (ref 0.8–1.2)
KETONES UR QL STRIP.AUTO: NEGATIVE MG/DL
LEUKOCYTE ESTERASE UR QL STRIP.AUTO: NEGATIVE
LYMPHOCYTES # BLD: 1.1 K/UL (ref 0.9–3.6)
LYMPHOCYTES NFR BLD: 21 % (ref 21–52)
MCH RBC QN AUTO: 30.9 PG (ref 24–34)
MCHC RBC AUTO-ENTMCNC: 32.5 G/DL (ref 31–37)
MCV RBC AUTO: 95.2 FL (ref 78–100)
MONOCYTES # BLD: 0.5 K/UL (ref 0.05–1.2)
MONOCYTES NFR BLD: 10 % (ref 3–10)
NEUTS SEG # BLD: 3.6 K/UL (ref 1.8–8)
NEUTS SEG NFR BLD: 66 % (ref 40–73)
NITRITE UR QL STRIP.AUTO: NEGATIVE
NRBC # BLD: 0 K/UL (ref 0–0.01)
NRBC BLD-RTO: 0 PER 100 WBC
PH UR STRIP: 5.5 [PH] (ref 5–8)
PLATELET # BLD AUTO: 164 K/UL (ref 135–420)
PMV BLD AUTO: 10.4 FL (ref 9.2–11.8)
POTASSIUM SERPL-SCNC: 4 MMOL/L (ref 3.5–5.5)
PROT UR STRIP-MCNC: NEGATIVE MG/DL
PROTHROMBIN TIME: 35.5 SEC (ref 11.5–15.2)
RBC # BLD AUTO: 4.17 M/UL (ref 4.35–5.65)
SODIUM SERPL-SCNC: 139 MMOL/L (ref 136–145)
SP GR UR REFRACTOMETRY: 1.01 (ref 1–1.03)
SPECIMEN EXP DATE BLD: NORMAL
UROBILINOGEN UR QL STRIP.AUTO: 0.2 EU/DL (ref 0.2–1)
WBC # BLD AUTO: 5.4 K/UL (ref 4.6–13.2)

## 2022-02-25 PROCEDURE — 87086 URINE CULTURE/COLONY COUNT: CPT

## 2022-02-25 PROCEDURE — 86900 BLOOD TYPING SEROLOGIC ABO: CPT

## 2022-02-25 PROCEDURE — 85730 THROMBOPLASTIN TIME PARTIAL: CPT

## 2022-02-25 PROCEDURE — 81003 URINALYSIS AUTO W/O SCOPE: CPT

## 2022-02-25 PROCEDURE — 36415 COLL VENOUS BLD VENIPUNCTURE: CPT

## 2022-02-25 PROCEDURE — 85610 PROTHROMBIN TIME: CPT

## 2022-02-25 PROCEDURE — 80048 BASIC METABOLIC PNL TOTAL CA: CPT

## 2022-02-25 PROCEDURE — 85025 COMPLETE CBC W/AUTO DIFF WBC: CPT

## 2022-02-26 LAB
BACTERIA SPEC CULT: NORMAL
SERVICE CMNT-IMP: NORMAL

## 2022-02-28 ENCOUNTER — HOSPITAL ENCOUNTER (EMERGENCY)
Age: 67
Discharge: HOME | End: 2022-02-28
Attending: EMERGENCY MEDICINE
Payer: MEDICARE

## 2022-02-28 ENCOUNTER — APPOINTMENT (OUTPATIENT)
Dept: GENERAL RADIOLOGY | Age: 67
End: 2022-02-28
Attending: PHYSICIAN ASSISTANT
Payer: MEDICARE

## 2022-02-28 ENCOUNTER — APPOINTMENT (OUTPATIENT)
Dept: CT IMAGING | Age: 67
End: 2022-02-28
Attending: PHYSICIAN ASSISTANT
Payer: MEDICARE

## 2022-02-28 VITALS
SYSTOLIC BLOOD PRESSURE: 156 MMHG | HEART RATE: 74 BPM | RESPIRATION RATE: 19 BRPM | DIASTOLIC BLOOD PRESSURE: 80 MMHG | HEIGHT: 70 IN | BODY MASS INDEX: 33.36 KG/M2 | WEIGHT: 233 LBS | OXYGEN SATURATION: 96 % | TEMPERATURE: 98 F

## 2022-02-28 DIAGNOSIS — R79.1 SUPRATHERAPEUTIC INR: ICD-10-CM

## 2022-02-28 DIAGNOSIS — R07.9 CHEST PAIN, UNSPECIFIED TYPE: ICD-10-CM

## 2022-02-28 DIAGNOSIS — M25.512 PAIN IN JOINT OF LEFT SHOULDER: Primary | ICD-10-CM

## 2022-02-28 LAB
ALBUMIN SERPL-MCNC: 3.6 G/DL (ref 3.4–5)
ALBUMIN/GLOB SERPL: 1.2 {RATIO} (ref 0.8–1.7)
ALP SERPL-CCNC: 76 U/L (ref 45–117)
ALT SERPL-CCNC: 32 U/L (ref 16–61)
ANION GAP SERPL CALC-SCNC: 6 MMOL/L (ref 3–18)
AST SERPL-CCNC: 18 U/L (ref 10–38)
ATRIAL RATE: 72 BPM
ATRIAL RATE: 84 BPM
BASOPHILS # BLD: 0 K/UL (ref 0–0.1)
BASOPHILS NFR BLD: 1 % (ref 0–2)
BILIRUB SERPL-MCNC: 0.4 MG/DL (ref 0.2–1)
BNP SERPL-MCNC: 53 PG/ML (ref 0–900)
BUN SERPL-MCNC: 11 MG/DL (ref 7–18)
BUN/CREAT SERPL: 13 (ref 12–20)
CALCIUM SERPL-MCNC: 9 MG/DL (ref 8.5–10.1)
CALCULATED P AXIS, ECG09: 28 DEGREES
CALCULATED P AXIS, ECG09: 62 DEGREES
CALCULATED R AXIS, ECG10: -15 DEGREES
CALCULATED R AXIS, ECG10: 0 DEGREES
CALCULATED T AXIS, ECG11: 40 DEGREES
CALCULATED T AXIS, ECG11: 48 DEGREES
CHLORIDE SERPL-SCNC: 102 MMOL/L (ref 100–111)
CO2 SERPL-SCNC: 29 MMOL/L (ref 21–32)
CREAT SERPL-MCNC: 0.84 MG/DL (ref 0.6–1.3)
DIAGNOSIS, 93000: NORMAL
DIAGNOSIS, 93000: NORMAL
DIFFERENTIAL METHOD BLD: ABNORMAL
EOSINOPHIL # BLD: 0.2 K/UL (ref 0–0.4)
EOSINOPHIL NFR BLD: 2 % (ref 0–5)
ERYTHROCYTE [DISTWIDTH] IN BLOOD BY AUTOMATED COUNT: 13.1 % (ref 11.6–14.5)
GLOBULIN SER CALC-MCNC: 3 G/DL (ref 2–4)
GLUCOSE SERPL-MCNC: 109 MG/DL (ref 74–99)
HCT VFR BLD AUTO: 40.4 % (ref 36–48)
HGB BLD-MCNC: 13.6 G/DL (ref 13–16)
IMM GRANULOCYTES # BLD AUTO: 0 K/UL (ref 0–0.04)
IMM GRANULOCYTES NFR BLD AUTO: 0 % (ref 0–0.5)
INR PPP: 3.4 (ref 0.8–1.2)
LYMPHOCYTES # BLD: 1.2 K/UL (ref 0.9–3.6)
LYMPHOCYTES NFR BLD: 17 % (ref 21–52)
MCH RBC QN AUTO: 31.7 PG (ref 24–34)
MCHC RBC AUTO-ENTMCNC: 33.7 G/DL (ref 31–37)
MCV RBC AUTO: 94.2 FL (ref 78–100)
MONOCYTES # BLD: 0.7 K/UL (ref 0.05–1.2)
MONOCYTES NFR BLD: 10 % (ref 3–10)
NEUTS SEG # BLD: 4.8 K/UL (ref 1.8–8)
NEUTS SEG NFR BLD: 70 % (ref 40–73)
NRBC # BLD: 0 K/UL (ref 0–0.01)
NRBC BLD-RTO: 0 PER 100 WBC
P-R INTERVAL, ECG05: 148 MS
P-R INTERVAL, ECG05: 202 MS
PLATELET # BLD AUTO: 161 K/UL (ref 135–420)
PMV BLD AUTO: 10.8 FL (ref 9.2–11.8)
POTASSIUM SERPL-SCNC: 4 MMOL/L (ref 3.5–5.5)
PROT SERPL-MCNC: 6.6 G/DL (ref 6.4–8.2)
PROTHROMBIN TIME: 34 SEC (ref 11.5–15.2)
Q-T INTERVAL, ECG07: 360 MS
Q-T INTERVAL, ECG07: 396 MS
QRS DURATION, ECG06: 84 MS
QRS DURATION, ECG06: 94 MS
QTC CALCULATION (BEZET), ECG08: 425 MS
QTC CALCULATION (BEZET), ECG08: 433 MS
RBC # BLD AUTO: 4.29 M/UL (ref 4.35–5.65)
SODIUM SERPL-SCNC: 137 MMOL/L (ref 136–145)
TROPONIN-HIGH SENSITIVITY: 8 NG/L (ref 0–78)
TROPONIN-HIGH SENSITIVITY: 8 NG/L (ref 0–78)
VENTRICULAR RATE, ECG03: 72 BPM
VENTRICULAR RATE, ECG03: 84 BPM
WBC # BLD AUTO: 6.8 K/UL (ref 4.6–13.2)

## 2022-02-28 PROCEDURE — 71045 X-RAY EXAM CHEST 1 VIEW: CPT

## 2022-02-28 PROCEDURE — 84484 ASSAY OF TROPONIN QUANT: CPT

## 2022-02-28 PROCEDURE — 74011000636 HC RX REV CODE- 636: Performed by: EMERGENCY MEDICINE

## 2022-02-28 PROCEDURE — 71275 CT ANGIOGRAPHY CHEST: CPT

## 2022-02-28 PROCEDURE — 85610 PROTHROMBIN TIME: CPT

## 2022-02-28 PROCEDURE — 99285 EMERGENCY DEPT VISIT HI MDM: CPT

## 2022-02-28 PROCEDURE — 83880 ASSAY OF NATRIURETIC PEPTIDE: CPT

## 2022-02-28 PROCEDURE — 80053 COMPREHEN METABOLIC PANEL: CPT

## 2022-02-28 PROCEDURE — 85025 COMPLETE CBC W/AUTO DIFF WBC: CPT

## 2022-02-28 PROCEDURE — 93005 ELECTROCARDIOGRAM TRACING: CPT

## 2022-02-28 RX ADMIN — IOPAMIDOL 80 ML: 755 INJECTION, SOLUTION INTRAVENOUS at 12:43

## 2022-02-28 NOTE — Clinical Note
[address] [facility] EMERGENCY DEPT  [address] [address] [phone]    Work/School Note    Date: 2/28/2022    To Whom It May concern:    Mariela Randle was seen and treated today in the emergency room by the following provider(s):  Attending Provider: Emmanuelle Lance DO  Physician Assistant: Anu Dominguez. Mariela Randle is excused from work/school on 02/28/22 and 03/01/22. He is medically clear to return to work/school on 3/2/2022.        Sincerely,          CARA Fuentes

## 2022-02-28 NOTE — Clinical Note
[address] [facility] EMERGENCY DEPT  [id number] [facility] [address] [phone]    Work/School Note    Date: 2/28/2022    To Whom It May concern:    Cecilia Yan was seen and treated today in the emergency room by the following provider(s):  Attending Provider: Octavio Mae DO  Physician Assistant: Anu Marr. Cecilia Yan is excused from work/school on 02/28/22 and 03/01/22. He is medically clear to return to work/school on 3/2/2022.        Sincerely,          CARA Samson

## 2022-02-28 NOTE — ED PROVIDER NOTES
EMERGENCY DEPARTMENT HISTORY AND PHYSICAL EXAM    11:41 AM      Date: 2/28/2022  Patient Name: Chrystal Burkitt    History of Presenting Illness     Chief Complaint   Patient presents with    Shoulder Pain    Chest Pain     History Provided By: Patient    Additional History (Context): Chrystal Burkitt is a 74 y.o. male with hx of HTN, HLD, GERD, DVT on Coumadin, prostate cancer in remission, and other noted PMH who presents with complaint of left shoulder and upper chest pain intermittently x 4 days. Pt notes the pain is mild, \"fluttering\", and worse with movement. \"I may go 6 or more hours without pain. \" Pt denies fever/chills, diaphoresis, cough, dyspnea, pleuritic or exertional symptoms, radiation of pain into neck or arm, orthopnea, leg edema, n/v. Denies hx of CAD, hx of PE, hemoptysis, recent surgery/travel, leg swelling, smoking hx. Notes he has been compliant with his Coumadin. PCP: Jorge Valdez NP    Current Outpatient Medications   Medication Sig Dispense Refill    ondansetron hcl (Zofran) 4 mg tablet Take 1 Tablet by mouth every eight (8) hours as needed for Nausea. 12 Tablet 0    allopurinoL (ZYLOPRIM) 100 mg tablet Take 1 Tablet by mouth two (2) times a day. 60 Tablet 2    enoxaparin (LOVENOX) 40 mg/0.4 mL Take 1 injection daily beginning on Sunday January 9th, 2022 and ending on Thursday January 13th, 2022. 5 Each 0    clotrimazole-betamethasone (LOTRISONE) 1-0.05 % lotion Apply  to affected area two (2) times a day. 30 mL 0    gabapentin (NEURONTIN) 100 mg capsule Take 2 Capsules by mouth nightly. Max Daily Amount: 200 mg. (Patient taking differently: Take 100 mg by mouth nightly.) 180 Capsule 0    allopurinoL (ZYLOPRIM) 100 mg tablet Take 1 Tablet by mouth two (2) times a day. 60 Tablet 0    metaxalone (Skelaxin) 800 mg tablet Take 1 tab by mouth BID-TID prn muscle spasm 60 Tablet 2    diclofenac (VOLTAREN) 1 % gel Apply 4 g to affected area four (4) times daily.  500 g 3    L gasseri/B bifidum/B longum (MCALLISTER' [address]) Take  by mouth nightly.  lansoprazole (PREVACID) 30 mg capsule Take 30 mg by mouth daily.  warfarin (COUMADIN) 5 mg tablet TAKE 2 AND 1/2 TABLETS BY MOUTH DAILY OR AS DIRECTED (Patient taking differently: Take  by mouth daily. TAKE 2 AND 1/2 TABLETS BY MOUTH TUES, THURS, SAT or as directed) 75 Tab 0    lisinopril-hydroCHLOROthiazide (PRINZIDE, ZESTORETIC) 20-12.5 mg per tablet TAKE 1 TABLET BY MOUTH DAILY (Patient taking differently: Take 1 Tab by mouth daily. TAKE 1 TABLET BY MOUTH DAILY) 90 Tab 1    labetalol (NORMODYNE) 100 mg tablet TAKE 1 TABLET BY MOUTH TWICE DAILY. STOP VERAPAMIL (Patient taking differently: Take  by mouth two (2) times a day.) 180 Tab 0    butalbital-acetaminophen-caff (FIORICET) -40 mg per capsule 2 cap three times per day as needed for headache (Patient taking differently: Take 1 Capsule by mouth daily. 2 cap three times per day as needed for headache) 180 Cap 1    ALPRAZolam (XANAX) 0.5 mg tablet Take one half(1/2) tab to one(1) tab by mouth at bedtime as needed for sleep (Patient taking differently: Take  by mouth nightly as needed. Take one half(1/2) tab to one(1) tab by mouth at bedtime as needed for sleep) 30 Tab 0    montelukast (SINGULAIR) 10 mg tablet TAKE 1 TABLET BY MOUTH EVERY DAY 90 Tab 0    acetaminophen (TYLENOL) 500 mg tablet Take 1,000 mg by mouth every six (6) hours as needed for Pain. Past History     Past Medical History:  Past Medical History:   Diagnosis Date    Arthritis     Chronic pain     knee and shoulder    DVT (deep venous thrombosis) ([facility] [address] 1992    post sx.   R LEG    [facility] thrombosis[facility] ([facility] 2000    POST BACK SX. 2- LEFT LEG    GERD (gastroesophageal reflux disease)     Headache(784.0)     everyday    High cholesterol     Hypertension     Prostate cancer ([facility] [address]     Pure hypercholesterolemia     Spinal stenosis     Thromboembolus [facility])        Past Surgical History:  Past Surgical History:   Procedure Laterality Date    HX HEENT Right 09/11/2018    eye surgery, macular     HX KNEE REPLACEMENT Left 2018    HX LUMBAR LAMINECTOMY  1992    HX LUMBAR LAMINECTOMY  2000    HX ORTHOPAEDIC  06-25-12    Right foot with excision of bursa and adipose tissue from fifth metatarsal base by Dr. Humberto Soria HX PROSTATECTOMY  11/2018    HX ROTATOR CUFF REPAIR Right 01/28/2019    by Dr. Padgett Coil ARTHROSCOPY Left 12/2020    WORK RELATED INJURY       Family History:  Family History   Problem Relation Age of Onset   [facility] Arthritis-rheumatoid Mother     Dementia Mother     Heart Disease Father     Cancer Father     Hypertension Other     Cancer Brother        Social History:  Social History     Tobacco Use    Smoking status: Never Smoker    Smokeless tobacco: Never Used   Vaping Use    Vaping Use: Never used   Substance Use Topics    Alcohol use: No    Drug use: Never       Allergies: Allergies   Allergen Reactions    Amoxicillin Itching    Augmentin [Amoxicillin-Pot Clavulanate] Itching    Chlorhexidine Towelette Itching    Hibiclens [Chlorhexidine Gluconate] Itching    Milk Containing Products Diarrhea    Nsaids (Non-Steroidal Anti-Inflammatory Drug) Other (comments)     On blood thinner, contraindicated.  Penicillins Rash    Requip [Ropinirole] Nausea and Vomiting         Review of Systems       Review of Systems   Constitutional: Negative for chills and fever. Respiratory: Negative for shortness of breath. Cardiovascular: Positive for chest pain. Gastrointestinal: Negative for abdominal pain, nausea and vomiting. Musculoskeletal: Positive for arthralgias and myalgias. Skin: Negative for rash. Neurological: Negative for weakness. All other systems reviewed and are negative.         Physical Exam     Visit Vitals  BP (!) 156/80   Pulse 74   Temp 98 °F (36.7 °C)   Resp 19   Ht 5' 10\" (1.778 m)   Wt 105.7 kg (233 lb)   SpO2 96%   BMI 33.43 kg/m²         Physical Exam  Vitals and nursing note reviewed. Constitutional:       General: He is not in acute distress. Appearance: He is well-developed. He is not ill-appearing, toxic-appearing or diaphoretic. HENT:      Head: Normocephalic and atraumatic. Cardiovascular:      Rate and Rhythm: Normal rate and regular rhythm. Heart sounds: Normal heart sounds. No murmur heard. No friction rub. No gallop. Pulmonary:      Effort: Pulmonary effort is normal. No respiratory distress. Breath sounds: Normal breath sounds. No wheezing or rales. Chest:      Chest wall: No mass, tenderness or edema. Abdominal:      Palpations: Abdomen is soft. Tenderness: There is no guarding or rebound. Musculoskeletal:         General: Normal range of motion. Left shoulder: Normal. No deformity, effusion, tenderness or crepitus. Normal range of motion. Normal strength. Normal pulse. Cervical back: Normal range of motion and neck supple. Right lower leg: No edema. Left lower leg: No edema. Skin:     General: Skin is warm. Findings: No rash. Neurological:      Mental Status: He is alert.            Diagnostic Study Results     Labs -  Recent Results (from the past 12 hour(s))   EKG, 12 LEAD, INITIAL    Collection Time: 02/28/22 10:33 AM   Result Value Ref Range    Ventricular Rate 84 BPM    Atrial Rate 84 BPM    P-R Interval 148 ms    QRS Duration 84 ms    Q-T Interval 360 ms    QTC Calculation (Bezet) 425 ms    Calculated P Axis 28 degrees    Calculated R Axis -15 degrees    Calculated T Axis 40 degrees    Diagnosis       Normal sinus rhythm  Cannot rule out Anterior infarct , age undetermined  Abnormal ECG  When compared with ECG of 27-OCT-2021 14:35,  Minimal criteria for Anterior infarct are now present  No significant change was found     CBC WITH AUTOMATED DIFF    Collection Time: 02/28/22 10:35 AM   Result Value Ref Range    WBC 6.8 4.6 - 13.2 K/uL    RBC 4.29 (L) 4.35 - 5.65 M/uL    HGB 13.6 13.0 - 16.0 g/dL    HCT 40.4 36.0 - 48.0 %    MCV 94.2 78.0 - 100.0 FL    MCH 31.7 24.0 - 34.0 PG    MCHC 33.7 31.0 - 37.0 g/dL    RDW 13.1 11.6 - 14.5 %    PLATELET 563 687 - 994 K/uL    MPV 10.8 9.2 - 11.8 FL    NRBC 0.0 0  WBC    ABSOLUTE NRBC 0.00 0.00 - 0.01 K/uL    NEUTROPHILS 70 40 - 73 %    LYMPHOCYTES 17 (L) 21 - 52 %    MONOCYTES 10 3 - 10 %    EOSINOPHILS 2 0 - 5 %    BASOPHILS 1 0 - 2 %    IMMATURE GRANULOCYTES 0 0.0 - 0.5 %    ABS. NEUTROPHILS 4.8 1.8 - 8.0 K/UL    ABS. LYMPHOCYTES 1.2 0.9 - 3.6 K/UL    ABS. MONOCYTES 0.7 0.05 - 1.2 K/UL    ABS. EOSINOPHILS 0.2 0.0 - 0.4 K/UL    ABS. BASOPHILS 0.0 0.0 - 0.1 K/UL    ABS. IMM. GRANS. 0.0 0.00 - 0.04 K/UL    DF AUTOMATED     METABOLIC PANEL, COMPREHENSIVE    Collection Time: 02/28/22 10:35 AM   Result Value Ref Range    Sodium 137 136 - 145 mmol/L    Potassium 4.0 3.5 - 5.5 mmol/L    Chloride 102 100 - 111 mmol/L    CO2 29 21 - 32 mmol/L    Anion gap 6 3.0 - 18 mmol/L    Glucose 109 (H) 74 - 99 mg/dL    BUN 11 7.0 - 18 MG/DL    Creatinine 0.84 0.6 - 1.3 MG/DL    BUN/Creatinine ratio 13 12 - 20      GFR est AA >60 >60 ml/min/1.73m2    GFR est non-AA >60 >60 ml/min/1.73m2    Calcium 9.0 8.5 - 10.1 MG/DL    Bilirubin, total 0.4 0.2 - 1.0 MG/DL    ALT (SGPT) 32 16 - 61 U/L    AST (SGOT) 18 10 - 38 U/L    Alk.  phosphatase 76 45 - 117 U/L    Protein, total 6.6 6.4 - 8.2 g/dL    Albumin 3.6 3.4 - 5.0 g/dL    Globulin 3.0 2.0 - 4.0 g/dL    A-G Ratio 1.2 0.8 - 1.7     PROTHROMBIN TIME + INR    Collection Time: 02/28/22 10:35 AM   Result Value Ref Range    Prothrombin time 34.0 (H) 11.5 - 15.2 sec    INR 3.4 (H) 0.8 - 1.2     NT-PRO BNP    Collection Time: 02/28/22 10:35 AM   Result Value Ref Range    NT pro-BNP 53 0 - 900 PG/ML   TROPONIN-HIGH SENSITIVITY    Collection Time: 02/28/22 11:45 AM   Result Value Ref Range    Troponin-High Sensitivity 8 0 - 78 ng/L   EKG, 12 LEAD, SUBSEQUENT    Collection Time: 02/28/22  1:21 PM   Result Value Ref Range    Ventricular Rate 72 BPM    Atrial Rate 72 BPM    P-R Interval 202 ms    QRS Duration 94 ms    Q-T Interval 396 ms    QTC Calculation (Bezet) 433 ms    Calculated P Axis 62 degrees    Calculated R Axis 0 degrees    Calculated T Axis 48 degrees    Diagnosis       Normal sinus rhythm  Inferior infarct , age undetermined  Abnormal ECG  When compared with ECG of 28-FEB-2022 10:33,  No significant change was found     TROPONIN-HIGH SENSITIVITY    Collection Time: 02/28/22  1:25 PM   Result Value Ref Range    Troponin-High Sensitivity 8 0 - 78 ng/L       Radiologic Studies -   CTA CHEST W OR W WO CONT   Final Result   1. No convincing CT evidence of pulmonary embolism. 2.  Minimal dependent bibasilar atelectasis. No acute pulmonary finding. 3. Borderline bilateral hilar adenopathy. 4. Exophytic left renal cyst with partial wall calcification in upper pole. Thank you for your referral.         XR CHEST PORT   Final Result   1. No acute cardiopulmonary process. Medical Decision Making   I am the first provider for this patient. I reviewed the vital signs, available nursing notes, past medical history, past surgical history, family history and social history. Vital Signs-Reviewed the patient's vital signs. Pulse Oximetry Analysis -  96% on room air     Records Reviewed: Nursing Notes, Old Medical Records and Previous electrocardiograms (Time of Review: 11:41 AM)    ED Course: Progress Notes, Reevaluation, and Consults:  2:04 PM Reviewed results and plan with patient. Discussed need for close outpatient follow-up this week for reassessment. Discussed strict return precautions, including shortness of breath, chest pain, or any other medical concerns. Pt will hold Coumadin tonight. Has repeat PT/INR check on Wednesday.      Provider Notes (Medical Decision Making): 53-year-old male with history of hypertension, hyperlipidemia, DVT on Coumadin who presents to the ED due to left shoulder and upper chest pain intermittently x 4 days. Afebrile, nontoxic-appearing, looks well. No evidence of tachycardia, tachypnea, hypoxia. EKG without evidence of acute ischemia, troponin negative x2. CTA chest demonstrates no evidence of PE or aortic pathology. Patient has no pain at present. Do not feel further labs or imaging are warranted. Stable for discharge with close outpatient follow-up for further assessment. Strict return precautions provided. Diagnosis     Clinical Impression:   1. Pain in joint of left shoulder    2. Chest pain, unspecified type    3. Supratherapeutic INR        Disposition: home     Follow-up Information     Follow up With Specialties Details Why [place] [address] EMERGENCY DEPT Emergency Medicine  If symptoms worsen [address] [place] Neena Resendiz NP Nurse Practitioner Schedule an appointment as soon as possible for a visit   Anjelica Srinivassallyl[address] [facility] [address]  [phone]             Patient's Medications   Start Taking    No medications on file   Continue Taking    ACETAMINOPHEN (TYLENOL) 500 MG TABLET    Take 1,000 mg by mouth every six (6) hours as needed for Pain. ALLOPURINOL (ZYLOPRIM) 100 MG TABLET    Take 1 Tablet by mouth two (2) times a day. ALLOPURINOL (ZYLOPRIM) 100 MG TABLET    Take 1 Tablet by mouth two (2) times a day. ALPRAZOLAM (XANAX) 0.5 MG TABLET    Take one half(1/2) tab to one(1) tab by mouth at bedtime as needed for sleep    BUTALBITAL-ACETAMINOPHEN-CAFF (FIORICET) -40 MG PER CAPSULE    2 cap three times per day as needed for headache    CLOTRIMAZOLE-BETAMETHASONE (LOTRISONE) 1-0.05 % LOTION    Apply  to affected area two (2) times a day. DICLOFENAC (VOLTAREN) 1 % GEL    Apply 4 g to affected area four (4) times daily.     ENOXAPARIN (LOVENOX) 40 MG/0.4 ML    Take 1 injection daily beginning on Sunday January 9th, 2022 and ending on Thursday January 13th, 2022. GABAPENTIN (NEURONTIN) 100 MG CAPSULE    Take 2 Capsules by mouth nightly. Max Daily Amount: 200 mg.    L GASSERI/B BIFIDUM/B LONGUM (MCALLISTER' [address])    Take  by mouth nightly. LABETALOL (NORMODYNE) 100 MG TABLET    TAKE 1 TABLET BY MOUTH TWICE DAILY. STOP VERAPAMIL    LANSOPRAZOLE (PREVACID) 30 MG CAPSULE    Take 30 mg by mouth daily. LISINOPRIL-HYDROCHLOROTHIAZIDE (PRINZIDE, ZESTORETIC) 20-12.5 MG PER TABLET    TAKE 1 TABLET BY MOUTH DAILY    METAXALONE (SKELAXIN) 800 MG TABLET    Take 1 tab by mouth BID-TID prn muscle spasm    MONTELUKAST (SINGULAIR) 10 MG TABLET    TAKE 1 TABLET BY MOUTH EVERY DAY    ONDANSETRON HCL (ZOFRAN) 4 MG TABLET    Take 1 Tablet by mouth every eight (8) hours as needed for Nausea. WARFARIN (COUMADIN) 5 MG TABLET    TAKE 2 AND 1/2 TABLETS BY MOUTH DAILY OR AS DIRECTED   These Medications have changed    No medications on file   Stop Taking    No medications on file       Dictation disclaimer:  Please note that this dictation was completed with [employer], the [employer] voice recognition software. Quite often unanticipated grammatical, syntax, homophones, and other interpretive errors are inadvertently transcribed by the computer software. Please disregard these errors. Please excuse any errors that have escaped final proofreading.

## 2022-02-28 NOTE — ED TRIAGE NOTES
Pt reports left shoulder pain that radiates into his chest since Friday. States pain intermittent and rates a 2/10 at present, states movement makes worse.

## 2022-02-28 NOTE — DISCHARGE INSTRUCTIONS
Take medication as prescribed. Follow-up with your primary care physician within 2 days for reassessment. Bring the results from this visit with you for their review. Return to the ED immediately for any new, worsening, or persistent symptoms, including shortness of breath, weakness, or any other medical concerns.

## 2022-03-02 ENCOUNTER — OFFICE VISIT (OUTPATIENT)
Dept: ORTHOPEDIC SURGERY | Age: 67
End: 2022-03-02
Payer: MEDICARE

## 2022-03-02 VITALS
TEMPERATURE: 98.5 F | DIASTOLIC BLOOD PRESSURE: 81 MMHG | BODY MASS INDEX: 33.16 KG/M2 | RESPIRATION RATE: 16 BRPM | OXYGEN SATURATION: 97 % | SYSTOLIC BLOOD PRESSURE: 128 MMHG | HEIGHT: 70 IN | WEIGHT: 231.6 LBS | HEART RATE: 81 BPM

## 2022-03-02 DIAGNOSIS — T84.093S FAILED TOTAL LEFT KNEE REPLACEMENT, SEQUELA: Primary | ICD-10-CM

## 2022-03-02 DIAGNOSIS — Z79.01 WARFARIN ANTICOAGULATION: ICD-10-CM

## 2022-03-02 DIAGNOSIS — Z01.818 PRE-OPERATIVE EXAM: ICD-10-CM

## 2022-03-02 PROCEDURE — G8417 CALC BMI ABV UP PARAM F/U: HCPCS | Performed by: PHYSICIAN ASSISTANT

## 2022-03-02 PROCEDURE — 73560 X-RAY EXAM OF KNEE 1 OR 2: CPT | Performed by: PHYSICIAN ASSISTANT

## 2022-03-02 PROCEDURE — 1101F PT FALLS ASSESS-DOCD LE1/YR: CPT | Performed by: PHYSICIAN ASSISTANT

## 2022-03-02 PROCEDURE — G8536 NO DOC ELDER MAL SCRN: HCPCS | Performed by: PHYSICIAN ASSISTANT

## 2022-03-02 PROCEDURE — G8754 DIAS BP LESS 90: HCPCS | Performed by: PHYSICIAN ASSISTANT

## 2022-03-02 PROCEDURE — 3017F COLORECTAL CA SCREEN DOC REV: CPT | Performed by: PHYSICIAN ASSISTANT

## 2022-03-02 PROCEDURE — G8427 DOCREV CUR MEDS BY ELIG CLIN: HCPCS | Performed by: PHYSICIAN ASSISTANT

## 2022-03-02 PROCEDURE — 99214 OFFICE O/P EST MOD 30 MIN: CPT | Performed by: PHYSICIAN ASSISTANT

## 2022-03-02 PROCEDURE — G8432 DEP SCR NOT DOC, RNG: HCPCS | Performed by: PHYSICIAN ASSISTANT

## 2022-03-02 PROCEDURE — G8752 SYS BP LESS 140: HCPCS | Performed by: PHYSICIAN ASSISTANT

## 2022-03-02 RX ORDER — ENOXAPARIN SODIUM 100 MG/ML
INJECTION SUBCUTANEOUS
Qty: 5 EACH | Refills: 0 | Status: SHIPPED | OUTPATIENT
Start: 2022-03-02 | End: 2022-03-10

## 2022-03-02 RX ORDER — PREGABALIN 25 MG/1
75 CAPSULE ORAL ONCE
Status: CANCELLED | OUTPATIENT
Start: 2022-03-02 | End: 2022-03-02

## 2022-03-02 RX ORDER — ACETAMINOPHEN 325 MG/1
1000 TABLET ORAL ONCE
Status: CANCELLED | OUTPATIENT
Start: 2022-03-02 | End: 2022-03-02

## 2022-03-02 RX ORDER — WARFARIN 1 MG/1
10 TABLET ORAL ONCE
Status: CANCELLED | OUTPATIENT
Start: 2022-03-02 | End: 2022-03-02

## 2022-03-02 NOTE — H&P
[address] [facility], [address] [facility]  [phone]           HISTORY & PHYSICAL      Patient: Diane North                MRN: [id number]       SSN: [id number]  YOB: 1955        AGE: 74 y.o. SEX: male  Body mass index is 33.23 kg/m². PCP: Pk Marley NP  03/02/22      CC: failed Left TKA  Problem List Items Addressed This Visit        Other    Warfarin anticoagulation      Other Visit Diagnoses     Failed total left knee replacement, sequela    -  Primary    Pre-operative exam                HPI:  The patient is a pleasant 74 y.o. whom had a previous TKA of their Left knee and has developed loosening of the femoral component. He has been worked up for infection with a aspiration and labs which were normal.  He has start up pain. Due to the current findings and affected activities of daily living, surgical intervention is indicated. The alternatives, risks, complications, as well as expected outcome were discussed. These include but are not limited to infection, blood loss, need for blood transfusion, neurovascular damage, DVT, PE,  post-op stiffness and pain, leg length discrepancy, dislocation, anesthetic complications, prothesis longevity, need for more surgery, MI, stroke, and even death. The patient understands and wishes to proceed with surgery. Past Medical History:   Diagnosis Date    Arthritis     Chronic pain     knee and shoulder    DVT (deep venous thrombosis) ([address] 1992    post sx.   R LEG    DVT (deep venous thrombosis) (HCC) 2000    POST BACK SX. 2- LEFT LEG    GERD (gastroesophageal reflux disease)     Headache(784.0)     everyday    High cholesterol     Hypertension     Prostate cancer ([address]     Pure hypercholesterolemia     Spinal stenosis     Thromboembolus (HCC)          Current Outpatient Medications:     ondansetron hcl (Zofran) 4 mg tablet, Take 1 Tablet by mouth every eight (8) hours as needed for Nausea., Disp: 12 Tablet, Rfl: 0    allopurinoL (ZYLOPRIM) 100 mg tablet, Take 1 Tablet by mouth two (2) times a day., Disp: 60 Tablet, Rfl: 2    enoxaparin (LOVENOX) 40 mg/0.4 mL, Take 1 injection daily beginning on Sunday January 9th, 2022 and ending on Thursday January 13th, 2022., Disp: 5 Each, Rfl: 0    clotrimazole-betamethasone (LOTRISONE) 1-0.05 % lotion, Apply  to affected area two (2) times a day., Disp: 30 mL, Rfl: 0    gabapentin (NEURONTIN) 100 mg capsule, Take 2 Capsules by mouth nightly. Max Daily Amount: 200 mg. (Patient taking differently: Take 100 mg by mouth nightly.), Disp: 180 Capsule, Rfl: 0    allopurinoL (ZYLOPRIM) 100 mg tablet, Take 1 Tablet by mouth two (2) times a day., Disp: 60 Tablet, Rfl: 0    metaxalone (Skelaxin) 800 mg tablet, Take 1 tab by mouth BID-TID prn muscle spasm, Disp: 60 Tablet, Rfl: 2    diclofenac (VOLTAREN) 1 % gel, Apply 4 g to affected area four (4) times daily. , Disp: 500 g, Rfl: 3    L gasseri/B bifidum/B longum ([facility] PO), Take  by mouth nightly., Disp: , Rfl:     lansoprazole (PREVACID) 30 mg capsule, Take 30 mg by mouth daily. , Disp: , Rfl:     warfarin (COUMADIN) 5 mg tablet, TAKE 2 AND 1/2 TABLETS BY MOUTH DAILY OR AS DIRECTED (Patient taking differently: Take  by mouth daily. TAKE 2 AND 1/2 TABLETS BY MOUTH TUES, THURS, SAT or as directed), Disp: 75 Tab, Rfl: 0    lisinopril-hydroCHLOROthiazide (PRINZIDE, ZESTORETIC) 20-12.5 mg per tablet, TAKE 1 TABLET BY MOUTH DAILY (Patient taking differently: Take 1 Tab by mouth daily. TAKE 1 TABLET BY MOUTH DAILY), Disp: 90 Tab, Rfl: 1    labetalol (NORMODYNE) 100 mg tablet, TAKE 1 TABLET BY MOUTH TWICE DAILY.  STOP VERAPAMIL (Patient taking differently: Take  by mouth two (2) times a day.), Disp: 180 Tab, Rfl: 0    butalbital-acetaminophen-caff (FIORICET) -40 mg per capsule, 2 cap three times per day as needed for headache (Patient taking differently: Take 1 Capsule by mouth daily. 2 cap three times per day as needed for headache), Disp: 180 Cap, Rfl: 1    ALPRAZolam (XANAX) 0.5 mg tablet, Take one half(1/2) tab to one(1) tab by mouth at bedtime as needed for sleep (Patient taking differently: Take  by mouth nightly as needed. Take one half(1/2) tab to one(1) tab by mouth at bedtime as needed for sleep), Disp: 30 Tab, Rfl: 0    montelukast (SINGULAIR) 10 mg tablet, TAKE 1 TABLET BY MOUTH EVERY DAY, Disp: 90 Tab, Rfl: 0    acetaminophen (TYLENOL) 500 mg tablet, Take 1,000 mg by mouth every six (6) hours as needed for Pain., Disp: , Rfl:     Allergies   Allergen Reactions    Amoxicillin Itching    Augmentin [Amoxicillin-Pot Clavulanate] Itching    Chlorhexidine Towelette Itching    Hibiclens [Chlorhexidine Gluconate] Itching    Milk Containing Products Diarrhea    Nsaids (Non-Steroidal Anti-Inflammatory Drug) Other (comments)     On blood thinner, contraindicated.      Penicillins Rash    Requip [Ropinirole] Nausea and Vomiting       Social History     Socioeconomic History    Marital status:      Spouse name: Not on file    Number of children: Not on file    Years of education: Not on file    Highest education level: Not on file   Occupational History    Not on file   Tobacco Use    Smoking status: Never Smoker    Smokeless tobacco: Never Used   Vaping Use    Vaping Use: Never used   Substance and Sexual Activity    Alcohol use: No    Drug use: Never    Sexual activity: Not Currently   Other Topics Concern     Service Not Asked    Blood Transfusions Not Asked    Caffeine Concern Not Asked    Occupational Exposure Not Asked    Hobby Hazards Not Asked    Sleep Concern Not Asked    Stress Concern Not Asked    Weight Concern Not Asked    Special Diet Not Asked    Back Care Not Asked    Exercise Not Asked    Bike Helmet Not Asked   [address] Not Asked    Self-Exams Not Asked   Social History Narrative    Not on file Social Determinants of Health     Financial Resource Strain:     Difficulty of Paying Living Expenses: Not on file   Food Insecurity:     Worried About Running Out of Food in the Last Year: Not on file    Marley of Food in the Last Year: Not on file   Transportation Needs:     Lack of Transportation (Medical): Not on file    Lack of Transportation (Non-Medical): Not on file   Physical Activity:     Days of Exercise per Week: Not on file    Minutes of Exercise per Session: Not on file   Stress:     Feeling of Stress : Not on file   Social Connections:     Frequency of Communication with Friends and Family: Not on file    Frequency of Social Gatherings with Friends and Family: Not on file    Attends Rastafari Services: Not on file    Active Member of [address] [place] [address] [employer] or Organizations: Not on file    Attends Club or Organization Meetings: Not on file    Marital Status: Not on file   Intimate Partner Violence:     Fear of Current or Ex-Partner: Not on file    Emotionally Abused: Not on file    Physically Abused: Not on file    Sexually Abused: Not on file   Housing Stability:     Unable to Pay for Housing in the Last Year: Not on file    Number of [place] in the Last Year: Not on file    Unstable Housing in the Last Year: Not on file       Past Surgical History:   Procedure Laterality Date    HX HEENT Right 09/11/2018    eye surgery, macular     HX KNEE REPLACEMENT Left 2018    HX LUMBAR LAMINECTOMY  1992    HX LUMBAR LAMINECTOMY  2000    HX ORTHOPAEDIC  06-25-12    Right foot with excision of bursa and adipose tissue from fifth metatarsal base by Dr. Cj Brown  11/2018    HX Phylliss Haggis Right 01/28/2019    by Dr. Baxter [place] ARTHROSCOPY Left 12/2020    WORK RELATED INJURY       Family History:  Non-contributory.      PE:  Visit Vitals  /81   Pulse 81   Temp 98.5 °F (36.9 °C)   Resp 16   Ht 5' 10\" (1.778 m)   Wt 231 lb 9.6 oz (105.1 kg)   SpO2 97% BMI 33.23 kg/m²     A&O X3, NAD, well develop, well nourished  Heart: S1-S2, rrr  Lungs: CTA bilat  Abd: soft, nt, nt, + bs in all quadrants  Ext:  Pos distal pulses to DP, PT  Left knee- wound cdi  No erythema    X-ray: Radiographs of the left knee done 3/2/2022 at the [facility] location including AP and lateral shows no acute abnormalities. The total knee components are intact with evidence for loosening of the femoral component. There is lucency to the anterior flange. Labs: labs were reviewed and wnl.  ua neg    A:  Left  Knee, failed TKA     P:  At this point we will move forward with surgery. Again, the alternatives, risks, complications, as well as expected outcome were discussed and the patient wishes to proceed with surgery. Pt has been instructed to stop aspirin, nsaids, rheumatologic medications and blood thinners. They have also been instructed to continue on any heart and bp meds and to take them the morning of surgery with sips of water. The patient was counseled at length about the risks of braydon Covid-19 during their perioperative period and any recovery window from their procedure. The patient was made aware that braydon Covid-19  may worsen their prognosis for recovering from their procedure and lend to a higher morbidity and/or mortality risk. All material risks, benefits, and reasonable alternatives including postponing the procedure were discussed. The patient does  wish to proceed with the procedure at this time.             Marilu Chau

## 2022-03-02 NOTE — H&P (VIEW-ONLY)
[address] [facility], [address] [facility]  [phone]           HISTORY & PHYSICAL      Patient: Jovan Rodriguez                MRN: [id number]       SSN: [id number]  YOB: 1955        AGE: 74 y.o. SEX: male  Body mass index is 33.23 kg/m². PCP: Rick Yañez NP  03/02/22      CC: failed Left TKA  Problem List Items Addressed This Visit        Other    Warfarin anticoagulation      Other Visit Diagnoses     Failed total left knee replacement, sequela    -  Primary    Pre-operative exam                HPI:  The patient is a pleasant 74 y.o. whom had a previous TKA of their Left knee and has developed loosening of the femoral component. He has been worked up for infection with a aspiration and labs which were normal.  He has start up pain. Due to the current findings and affected activities of daily living, surgical intervention is indicated. The alternatives, risks, complications, as well as expected outcome were discussed. These include but are not limited to infection, blood loss, need for blood transfusion, neurovascular damage, DVT, PE,  post-op stiffness and pain, leg length discrepancy, dislocation, anesthetic complications, prothesis longevity, need for more surgery, MI, stroke, and even death. The patient understands and wishes to proceed with surgery. Past Medical History:   Diagnosis Date    Arthritis     Chronic pain     knee and shoulder    DVT (deep venous thrombosis) ([address] 1992    post sx.   R LEG    DVT (deep venous thrombosis) ([facility] 2000    POST BACK SX. 2- LEFT LEG    GERD (gastroesophageal reflux disease)     Headache(784.0)     everyday    High cholesterol     Hypertension     Prostate cancer ([address]     Pure hypercholesterolemia     Spinal stenosis     Thromboembolus (HCC)          Current Outpatient Medications:     ondansetron hcl (Zofran) 4 mg tablet, Take 1 Tablet by mouth every eight (8) hours as needed for Nausea., Disp: 12 Tablet, Rfl: 0    allopurinoL (ZYLOPRIM) 100 mg tablet, Take 1 Tablet by mouth two (2) times a day., Disp: 60 Tablet, Rfl: 2    enoxaparin (LOVENOX) 40 mg/0.4 mL, Take 1 injection daily beginning on Sunday January 9th, 2022 and ending on Thursday January 13th, 2022., Disp: 5 Each, Rfl: 0    clotrimazole-betamethasone (LOTRISONE) 1-0.05 % lotion, Apply  to affected area two (2) times a day., Disp: 30 mL, Rfl: 0    gabapentin (NEURONTIN) 100 mg capsule, Take 2 Capsules by mouth nightly. Max Daily Amount: 200 mg. (Patient taking differently: Take 100 mg by mouth nightly.), Disp: 180 Capsule, Rfl: 0    allopurinoL (ZYLOPRIM) 100 mg tablet, Take 1 Tablet by mouth two (2) times a day., Disp: 60 Tablet, Rfl: 0    metaxalone (Skelaxin) 800 mg tablet, Take 1 tab by mouth BID-TID prn muscle spasm, Disp: 60 Tablet, Rfl: 2    diclofenac (VOLTAREN) 1 % gel, Apply 4 g to affected area four (4) times daily. , Disp: 500 g, Rfl: 3    L gasseri/B bifidum/B longum ([facility] PO), Take  by mouth nightly., Disp: , Rfl:     lansoprazole (PREVACID) 30 mg capsule, Take 30 mg by mouth daily. , Disp: , Rfl:     warfarin (COUMADIN) 5 mg tablet, TAKE 2 AND 1/2 TABLETS BY MOUTH DAILY OR AS DIRECTED (Patient taking differently: Take  by mouth daily. TAKE 2 AND 1/2 TABLETS BY MOUTH TUES, THURS, SAT or as directed), Disp: 75 Tab, Rfl: 0    lisinopril-hydroCHLOROthiazide (PRINZIDE, ZESTORETIC) 20-12.5 mg per tablet, TAKE 1 TABLET BY MOUTH DAILY (Patient taking differently: Take 1 Tab by mouth daily. TAKE 1 TABLET BY MOUTH DAILY), Disp: 90 Tab, Rfl: 1    labetalol (NORMODYNE) 100 mg tablet, TAKE 1 TABLET BY MOUTH TWICE DAILY.  STOP VERAPAMIL (Patient taking differently: Take  by mouth two (2) times a day.), Disp: 180 Tab, Rfl: 0    butalbital-acetaminophen-caff (FIORICET) -40 mg per capsule, 2 cap three times per day as needed for headache (Patient taking differently: Take 1 Capsule by mouth daily. 2 cap three times per day as needed for headache), Disp: 180 Cap, Rfl: 1    ALPRAZolam (XANAX) 0.5 mg tablet, Take one half(1/2) tab to one(1) tab by mouth at bedtime as needed for sleep (Patient taking differently: Take  by mouth nightly as needed. Take one half(1/2) tab to one(1) tab by mouth at bedtime as needed for sleep), Disp: 30 Tab, Rfl: 0    montelukast (SINGULAIR) 10 mg tablet, TAKE 1 TABLET BY MOUTH EVERY DAY, Disp: 90 Tab, Rfl: 0    acetaminophen (TYLENOL) 500 mg tablet, Take 1,000 mg by mouth every six (6) hours as needed for Pain., Disp: , Rfl:     Allergies   Allergen Reactions    Amoxicillin Itching    Augmentin [Amoxicillin-Pot Clavulanate] Itching    Chlorhexidine Towelette Itching    Hibiclens [Chlorhexidine Gluconate] Itching    Milk Containing Products Diarrhea    Nsaids (Non-Steroidal Anti-Inflammatory Drug) Other (comments)     On blood thinner, contraindicated.      Penicillins Rash    Requip [Ropinirole] Nausea and Vomiting       Social History     Socioeconomic History    Marital status:      Spouse name: Not on file    Number of children: Not on file    Years of education: Not on file    Highest education level: Not on file   Occupational History    Not on file   Tobacco Use    Smoking status: Never Smoker    Smokeless tobacco: Never Used   Vaping Use    Vaping Use: Never used   Substance and Sexual Activity    Alcohol use: No    Drug use: Never    Sexual activity: Not Currently   Other Topics Concern     Service Not Asked    Blood Transfusions Not Asked    Caffeine Concern Not Asked    Occupational Exposure Not Asked    Hobby Hazards Not Asked    Sleep Concern Not Asked    Stress Concern Not Asked    Weight Concern Not Asked    Special Diet Not Asked    Back Care Not Asked    Exercise Not Asked    Bike Helmet Not Asked   [address] Not Asked    Self-Exams Not Asked   Social History Narrative    Not on file Social Determinants of Health     Financial Resource Strain:     Difficulty of Paying Living Expenses: Not on file   Food Insecurity:     Worried About Running Out of Food in the Last Year: Not on file    Marley of Food in the Last Year: Not on file   Transportation Needs:     Lack of Transportation (Medical): Not on file    Lack of Transportation (Non-Medical): Not on file   Physical Activity:     Days of Exercise per Week: Not on file    Minutes of Exercise per Session: Not on file   Stress:     Feeling of Stress : Not on file   Social Connections:     Frequency of Communication with Friends and Family: Not on file    Frequency of Social Gatherings with Friends and Family: Not on file    Attends Sabianism Services: Not on file    Active Member of [address] [place] [address] [employer] or Organizations: Not on file    Attends Club or Organization Meetings: Not on file    Marital Status: Not on file   Intimate Partner Violence:     Fear of Current or Ex-Partner: Not on file    Emotionally Abused: Not on file    Physically Abused: Not on file    Sexually Abused: Not on file   Housing Stability:     Unable to Pay for Housing in the Last Year: Not on file    Number of [place] in the Last Year: Not on file    Unstable Housing in the Last Year: Not on file       Past Surgical History:   Procedure Laterality Date    HX HEENT Right 09/11/2018    eye surgery, macular     HX KNEE REPLACEMENT Left 2018    HX LUMBAR LAMINECTOMY  1992    HX LUMBAR LAMINECTOMY  2000    HX ORTHOPAEDIC  06-25-12    Right foot with excision of bursa and adipose tissue from fifth metatarsal base by Dr. Jeanne Mcqueen  11/2018    HX [place] Right 01/28/2019    by Dr. Cari Denver ARTHROSCOPY Left 12/2020    WORK RELATED INJURY       Family History:  Non-contributory.      PE:  Visit Vitals  /81   Pulse 81   Temp 98.5 °F (36.9 °C)   Resp 16   Ht 5' 10\" (1.778 m)   Wt 231 lb 9.6 oz (105.1 kg)   SpO2 97% BMI 33.23 kg/m²     A&O X3, NAD, well develop, well nourished  Heart: S1-S2, rrr  Lungs: CTA bilat  Abd: soft, nt, nt, + bs in all quadrants  Ext:  Pos distal pulses to DP, PT  Left knee- wound cdi  No erythema    X-ray: Radiographs of the left knee done 3/2/2022 at the [facility] location including AP and lateral shows no acute abnormalities. The total knee components are intact with evidence for loosening of the femoral component. There is lucency to the anterior flange. Labs: labs were reviewed and wnl.  ua neg    A:  Left  Knee, failed TKA     P:  At this point we will move forward with surgery. Again, the alternatives, risks, complications, as well as expected outcome were discussed and the patient wishes to proceed with surgery. Pt has been instructed to stop aspirin, nsaids, rheumatologic medications and blood thinners. They have also been instructed to continue on any heart and bp meds and to take them the morning of surgery with sips of water. The patient was counseled at length about the risks of braydon Covid-19 during their perioperative period and any recovery window from their procedure. The patient was made aware that braydon Covid-19  may worsen their prognosis for recovering from their procedure and lend to a higher morbidity and/or mortality risk. All material risks, benefits, and reasonable alternatives including postponing the procedure were discussed. The patient does  wish to proceed with the procedure at this time.             Chicho Harmon

## 2022-03-03 NOTE — PERIOP NOTES
PRE-SURGICAL INSTRUCTIONS        Patient's Name:  Ileana [id number] Date:  3/3/2022            Covid Testing Date and Time: 3/4/22    Surgery Date:  3/9/2022                1. Do NOT eat or drink anything, including candy, gum, or ice chips after midnight on 03/08/22, unless you have specific instructions from your surgeon or anesthesia provider to do so.  2. You may brush your teeth before coming to the hospital.  3. No smoking 24 hours prior to the day of surgery. 4. No alcohol 24 hours prior to the day of surgery. 5. No recreational drugs for one week prior to the day of surgery. 6. Leave all valuables, including money/purse, at home. 7. Remove all jewelry, nail polish, acrylic nails, and makeup (including mascara); no lotions powders, deodorant, or perfume/cologne/after shave on the skin. 8. Follow instruction for Hibiclens washes and CHG wipes from surgeon's office. 9. Glasses/contact lenses and dentures may be worn to the hospital.  They will be removed prior to surgery. 10. Call your doctor if symptoms of a cold or illness develop within 24-48 hours prior to your surgery. 11.  If you are having an outpatient procedure, please make arrangements for a responsible ADULT TO [address] [place] [address] and stay with you for 24 hours after your surgery. 12. ONE VISITOR in the hospital at this time for outpatient procedures. Exceptions may be made for surgical admissions, per nursing unit guidelines      Special Instructions:      Bring list of CURRENT medications. Bring any pertinent legal medical records. Take blood pressure medications the morning of surgery with a sip   Follow physician instructions about stopping anticoagulants. On the day of surgery, come in the main entrance of [facility]. Let the  at the desk know you are there for surgery. A staff member will come escort you to the surgical area on the second floor.     If you have any questions or concerns, please do not hesitate to call:     (Prior to the day of surgery) PAT department:  [phone]   (Day of surgery) Pre-Op department:  [phone]    These surgical instructions were reviewed with patient during the PAT phone call.

## 2022-03-04 ENCOUNTER — HOSPITAL ENCOUNTER (OUTPATIENT)
Dept: PREADMISSION TESTING | Age: 67
Discharge: HOME | End: 2022-03-04
Payer: MEDICARE

## 2022-03-04 DIAGNOSIS — Z01.818 PREOPERATIVE TESTING: ICD-10-CM

## 2022-03-04 PROCEDURE — U0003 INFECTIOUS AGENT DETECTION BY NUCLEIC ACID (DNA OR RNA); SEVERE ACUTE RESPIRATORY SYNDROME CORONAVIRUS 2 (SARS-COV-2) (CORONAVIRUS DISEASE [COVID-19]), AMPLIFIED PROBE TECHNIQUE, MAKING USE OF HIGH THROUGHPUT TECHNOLOGIES AS DESCRIBED BY CMS-2020-01-R: HCPCS

## 2022-03-05 LAB — SARS-COV-2, COV2NT: NOT DETECTED

## 2022-03-08 ENCOUNTER — ANESTHESIA EVENT (OUTPATIENT)
Dept: SURGERY | Age: 67
DRG: 467 | End: 2022-03-08
Payer: MEDICARE

## 2022-03-08 NOTE — NURSE NAVIGATOR
Call placed to patient , ID verified x 2. Patient is scheduled for a left knee revision with Dr. Shama Granados on 03/09/2022. Education provided to patient regarding expectations of surgery and outcomes. Patient has decided with their surgeon to have a revision to his left knee to improve his mobility and decrease their pain. Topics discussed included surgery preparation, what to expect the day of surgery, medications, physical and occupational therapy, and discharge planning. Patient educated to get his DME (front wheeled walker/bedside commode/shower chair) before surgery and to bring the walker to hospital day of surgery. Patient states that his surgery has been rescheduled numerous times so he has all equipment needed. patient education notebook will be provided to patient upon arrival to unit. Patient will remain NPO after midnight and is bathing with dial soap as he is allergic to the CHG. Opportunity was given to ask questions and phone number of the Orthopaedic   was given for any questions or concerns that may arise later. His wife will be brining him to and from his surgery.

## 2022-03-09 ENCOUNTER — ANESTHESIA (OUTPATIENT)
Dept: SURGERY | Age: 67
DRG: 467 | End: 2022-03-09
Payer: MEDICARE

## 2022-03-09 ENCOUNTER — APPOINTMENT (OUTPATIENT)
Dept: GENERAL RADIOLOGY | Age: 67
DRG: 467 | End: 2022-03-09
Attending: ORTHOPAEDIC SURGERY
Payer: MEDICARE

## 2022-03-09 ENCOUNTER — HOSPITAL ENCOUNTER (INPATIENT)
Age: 67
LOS: 1 days | Discharge: HOME HEALTH | DRG: 467 | End: 2022-03-10
Attending: ORTHOPAEDIC SURGERY | Admitting: ORTHOPAEDIC SURGERY
Payer: MEDICARE

## 2022-03-09 DIAGNOSIS — T84.018D FAILURE OF TOTAL KNEE REPLACEMENT, SUBSEQUENT ENCOUNTER: Primary | ICD-10-CM

## 2022-03-09 DIAGNOSIS — Z96.659 FAILURE OF TOTAL KNEE REPLACEMENT, SUBSEQUENT ENCOUNTER: Primary | ICD-10-CM

## 2022-03-09 DIAGNOSIS — T84.018D FAILURE OF TOTAL KNEE REPLACEMENT, SUBSEQUENT ENCOUNTER: ICD-10-CM

## 2022-03-09 DIAGNOSIS — Z96.659 FAILURE OF TOTAL KNEE REPLACEMENT, SUBSEQUENT ENCOUNTER: ICD-10-CM

## 2022-03-09 PROBLEM — T84.018A FAILED TOTAL KNEE REPLACEMENT (HCC): Status: ACTIVE | Noted: 2022-03-09

## 2022-03-09 LAB
APTT PPP: 28.8 SEC (ref 23–36.4)
INR PPP: 1.1 (ref 0.8–1.2)
PROTHROMBIN TIME: 13.6 SEC (ref 11.5–15.2)

## 2022-03-09 PROCEDURE — 74011250636 HC RX REV CODE- 250/636: Performed by: NURSE ANESTHETIST, CERTIFIED REGISTERED

## 2022-03-09 PROCEDURE — C1713 ANCHOR/SCREW BN/BN,TIS/BN: HCPCS | Performed by: ORTHOPAEDIC SURGERY

## 2022-03-09 PROCEDURE — 74011000250 HC RX REV CODE- 250: Performed by: ORTHOPAEDIC SURGERY

## 2022-03-09 PROCEDURE — 74011000250 HC RX REV CODE- 250: Performed by: PHYSICIAN ASSISTANT

## 2022-03-09 PROCEDURE — C1776 JOINT DEVICE (IMPLANTABLE): HCPCS | Performed by: ORTHOPAEDIC SURGERY

## 2022-03-09 PROCEDURE — 77030027138 HC INCENT SPIROMETER -A

## 2022-03-09 PROCEDURE — G0378 HOSPITAL OBSERVATION PER HR: HCPCS

## 2022-03-09 PROCEDURE — 74011250637 HC RX REV CODE- 250/637: Performed by: PHYSICIAN ASSISTANT

## 2022-03-09 PROCEDURE — 77030031139 HC SUT VCRL2 J&J -A: Performed by: ORTHOPAEDIC SURGERY

## 2022-03-09 PROCEDURE — 77030008683 HC TU ET CUF COVD -A: Performed by: ANESTHESIOLOGY

## 2022-03-09 PROCEDURE — 74011000250 HC RX REV CODE- 250: Performed by: NURSE ANESTHETIST, CERTIFIED REGISTERED

## 2022-03-09 PROCEDURE — 77030012935 HC DRSG AQUACEL BMS -B: Performed by: ORTHOPAEDIC SURGERY

## 2022-03-09 PROCEDURE — 0SRU0J9 REPLACEMENT OF LEFT KNEE JOINT, FEMORAL SURFACE WITH SYNTHETIC SUBSTITUTE, CEMENTED, OPEN APPROACH: ICD-10-PCS | Performed by: ORTHOPAEDIC SURGERY

## 2022-03-09 PROCEDURE — 77030002922 HC SUT FBRWRE ARTH -B: Performed by: ORTHOPAEDIC SURGERY

## 2022-03-09 PROCEDURE — 64450 NJX AA&/STRD OTHER PN/BRANCH: CPT | Performed by: ANESTHESIOLOGY

## 2022-03-09 PROCEDURE — 74011000258 HC RX REV CODE- 258: Performed by: PHYSICIAN ASSISTANT

## 2022-03-09 PROCEDURE — 77030018836 HC SOL IRR NACL ICUM -A: Performed by: ORTHOPAEDIC SURGERY

## 2022-03-09 PROCEDURE — 97530 THERAPEUTIC ACTIVITIES: CPT

## 2022-03-09 PROCEDURE — 77030006812 HC BLD SAW RECIP STRY -B: Performed by: ORTHOPAEDIC SURGERY

## 2022-03-09 PROCEDURE — 76942 ECHO GUIDE FOR BIOPSY: CPT | Performed by: ANESTHESIOLOGY

## 2022-03-09 PROCEDURE — 01402 ANES OPN/ARTH TOT KNE ARTHRP: CPT | Performed by: NURSE ANESTHETIST, CERTIFIED REGISTERED

## 2022-03-09 PROCEDURE — 77030003028 HC SUT VCRL J&J -A: Performed by: ORTHOPAEDIC SURGERY

## 2022-03-09 PROCEDURE — 97161 PT EVAL LOW COMPLEX 20 MIN: CPT

## 2022-03-09 PROCEDURE — 2709999900 HC NON-CHARGEABLE SUPPLY

## 2022-03-09 PROCEDURE — 77030013708 HC HNDPC SUC IRR PULS STRY –B: Performed by: ORTHOPAEDIC SURGERY

## 2022-03-09 PROCEDURE — 74011250636 HC RX REV CODE- 250/636: Performed by: ANESTHESIOLOGY

## 2022-03-09 PROCEDURE — 77030008462 HC STPLR SKN PROX J&J -A: Performed by: ORTHOPAEDIC SURGERY

## 2022-03-09 PROCEDURE — 27486 REVISE/REPLACE KNEE JOINT: CPT | Performed by: PHYSICIAN ASSISTANT

## 2022-03-09 PROCEDURE — 77030010785: Performed by: ORTHOPAEDIC SURGERY

## 2022-03-09 PROCEDURE — 77030040922 HC BLNKT HYPOTHRM STRY -A: Performed by: ORTHOPAEDIC SURGERY

## 2022-03-09 PROCEDURE — 85730 THROMBOPLASTIN TIME PARTIAL: CPT

## 2022-03-09 PROCEDURE — 76060000035 HC ANESTHESIA 2 TO 2.5 HR: Performed by: ORTHOPAEDIC SURGERY

## 2022-03-09 PROCEDURE — 76010000131 HC OR TIME 2 TO 2.5 HR: Performed by: ORTHOPAEDIC SURGERY

## 2022-03-09 PROCEDURE — 65270000029 HC RM PRIVATE

## 2022-03-09 PROCEDURE — 77030002933 HC SUT MCRYL J&J -A: Performed by: ORTHOPAEDIC SURGERY

## 2022-03-09 PROCEDURE — 77030026438 HC STYL ET INTUB CARD -A: Performed by: ANESTHESIOLOGY

## 2022-03-09 PROCEDURE — 01402 ANES OPN/ARTH TOT KNE ARTHRP: CPT | Performed by: ANESTHESIOLOGY

## 2022-03-09 PROCEDURE — 74011000258 HC RX REV CODE- 258: Performed by: ORTHOPAEDIC SURGERY

## 2022-03-09 PROCEDURE — 85610 PROTHROMBIN TIME: CPT

## 2022-03-09 PROCEDURE — C9290 INJ, BUPIVACAINE LIPOSOME: HCPCS | Performed by: ORTHOPAEDIC SURGERY

## 2022-03-09 PROCEDURE — 0SPU0JZ REMOVAL OF SYNTHETIC SUBSTITUTE FROM LEFT KNEE JOINT, FEMORAL SURFACE, OPEN APPROACH: ICD-10-PCS | Performed by: ORTHOPAEDIC SURGERY

## 2022-03-09 PROCEDURE — 74011000272 HC RX REV CODE- 272: Performed by: ORTHOPAEDIC SURGERY

## 2022-03-09 PROCEDURE — 74011250636 HC RX REV CODE- 250/636: Performed by: ORTHOPAEDIC SURGERY

## 2022-03-09 PROCEDURE — 74011250637 HC RX REV CODE- 250/637: Performed by: ORTHOPAEDIC SURGERY

## 2022-03-09 PROCEDURE — 73560 X-RAY EXAM OF KNEE 1 OR 2: CPT

## 2022-03-09 PROCEDURE — 77030006835 HC BLD SAW SAG STRY -B: Performed by: ORTHOPAEDIC SURGERY

## 2022-03-09 PROCEDURE — 77030034671 HC BUR RND UPWR CNMD -B: Performed by: ORTHOPAEDIC SURGERY

## 2022-03-09 PROCEDURE — 2709999900 HC NON-CHARGEABLE SUPPLY: Performed by: ORTHOPAEDIC SURGERY

## 2022-03-09 PROCEDURE — 76210000000 HC OR PH I REC 2 TO 2.5 HR: Performed by: ORTHOPAEDIC SURGERY

## 2022-03-09 PROCEDURE — 74011000258 HC RX REV CODE- 258: Performed by: NURSE ANESTHETIST, CERTIFIED REGISTERED

## 2022-03-09 PROCEDURE — 77030019557 HC ELECTRD VES SEAL MEDT -F: Performed by: ORTHOPAEDIC SURGERY

## 2022-03-09 PROCEDURE — 27486 REVISE/REPLACE KNEE JOINT: CPT | Performed by: ORTHOPAEDIC SURGERY

## 2022-03-09 DEVICE — COMPONENT FEM SZ 5 L KNEE TOT STBL TRIATHLON: Type: IMPLANTABLE DEVICE | Site: KNEE | Status: FUNCTIONAL

## 2022-03-09 DEVICE — STEM TIB L50MM DIA15MM CO CHROM TOT STBL CEM TRIATHLON: Type: IMPLANTABLE DEVICE | Site: KNEE | Status: FUNCTIONAL

## 2022-03-09 DEVICE — CEMENT BNE 20ML 41GM FULL DOSE PMMA W/ TOBRA M VISC RADPQ: Type: IMPLANTABLE DEVICE | Site: KNEE | Status: FUNCTIONAL

## 2022-03-09 DEVICE — IMPLANTABLE DEVICE: Type: IMPLANTABLE DEVICE | Site: KNEE | Status: FUNCTIONAL

## 2022-03-09 DEVICE — RESTRICTOR CEM OD30MM UNIV REV POLYMER IM INSRT DISPOSABLE: Type: IMPLANTABLE DEVICE | Site: KNEE | Status: FUNCTIONAL

## 2022-03-09 RX ORDER — DOCUSATE SODIUM 100 MG/1
100 CAPSULE, LIQUID FILLED ORAL 2 TIMES DAILY
Qty: 60 CAPSULE | Refills: 2 | Status: SHIPPED | OUTPATIENT
Start: 2022-03-09 | End: 2022-04-20

## 2022-03-09 RX ORDER — KETOROLAC TROMETHAMINE 15 MG/ML
15 INJECTION, SOLUTION INTRAMUSCULAR; INTRAVENOUS EVERY 6 HOURS
Status: COMPLETED | OUTPATIENT
Start: 2022-03-09 | End: 2022-03-10

## 2022-03-09 RX ORDER — LANOLIN ALCOHOL/MO/W.PET/CERES
1 CREAM (GRAM) TOPICAL 2 TIMES DAILY WITH MEALS
Status: DISCONTINUED | OUTPATIENT
Start: 2022-03-09 | End: 2022-03-10 | Stop reason: HOSPADM

## 2022-03-09 RX ORDER — HYDROCHLOROTHIAZIDE 12.5 MG/1
12.5 CAPSULE ORAL DAILY
Status: CANCELLED | OUTPATIENT
Start: 2022-03-10

## 2022-03-09 RX ORDER — WARFARIN 10 MG/1
10 TABLET ORAL ONCE
Status: DISCONTINUED | OUTPATIENT
Start: 2022-03-09 | End: 2022-03-09 | Stop reason: HOSPADM

## 2022-03-09 RX ORDER — SODIUM CHLORIDE, SODIUM LACTATE, POTASSIUM CHLORIDE, CALCIUM CHLORIDE 600; 310; 30; 20 MG/100ML; MG/100ML; MG/100ML; MG/100ML
25 INJECTION, SOLUTION INTRAVENOUS CONTINUOUS
Status: DISCONTINUED | OUTPATIENT
Start: 2022-03-09 | End: 2022-03-09 | Stop reason: HOSPADM

## 2022-03-09 RX ORDER — ONDANSETRON 2 MG/ML
4 INJECTION INTRAMUSCULAR; INTRAVENOUS
Status: DISCONTINUED | OUTPATIENT
Start: 2022-03-09 | End: 2022-03-10 | Stop reason: HOSPADM

## 2022-03-09 RX ORDER — NEOSTIGMINE METHYLSULFATE 1 MG/ML
INJECTION, SOLUTION INTRAVENOUS AS NEEDED
Status: DISCONTINUED | OUTPATIENT
Start: 2022-03-09 | End: 2022-03-09 | Stop reason: HOSPADM

## 2022-03-09 RX ORDER — LISINOPRIL 20 MG/1
20 TABLET ORAL DAILY
Status: CANCELLED | OUTPATIENT
Start: 2022-03-10

## 2022-03-09 RX ORDER — SODIUM CHLORIDE 0.9 % (FLUSH) 0.9 %
5-40 SYRINGE (ML) INJECTION AS NEEDED
Status: DISCONTINUED | OUTPATIENT
Start: 2022-03-09 | End: 2022-03-10 | Stop reason: HOSPADM

## 2022-03-09 RX ORDER — EPHEDRINE SULFATE/0.9% NACL/PF 25 MG/5 ML
SYRINGE (ML) INTRAVENOUS AS NEEDED
Status: DISCONTINUED | OUTPATIENT
Start: 2022-03-09 | End: 2022-03-09

## 2022-03-09 RX ORDER — LABETALOL 100 MG/1
100 TABLET, FILM COATED ORAL 2 TIMES DAILY
Status: DISCONTINUED | OUTPATIENT
Start: 2022-03-09 | End: 2022-03-10 | Stop reason: HOSPADM

## 2022-03-09 RX ORDER — OXYCODONE AND ACETAMINOPHEN 10; 325 MG/1; MG/1
1-2 TABLET ORAL
Qty: 56 TABLET | Refills: 0 | Status: SHIPPED | OUTPATIENT
Start: 2022-03-09 | End: 2022-03-16

## 2022-03-09 RX ORDER — FENTANYL CITRATE 50 UG/ML
100 INJECTION, SOLUTION INTRAMUSCULAR; INTRAVENOUS ONCE
Status: COMPLETED | OUTPATIENT
Start: 2022-03-09 | End: 2022-03-09

## 2022-03-09 RX ORDER — FAMOTIDINE 20 MG/1
20 TABLET, FILM COATED ORAL ONCE
Status: DISCONTINUED | OUTPATIENT
Start: 2022-03-09 | End: 2022-03-09 | Stop reason: HOSPADM

## 2022-03-09 RX ORDER — ONDANSETRON 2 MG/ML
INJECTION INTRAMUSCULAR; INTRAVENOUS AS NEEDED
Status: DISCONTINUED | OUTPATIENT
Start: 2022-03-09 | End: 2022-03-09 | Stop reason: HOSPADM

## 2022-03-09 RX ORDER — PANTOPRAZOLE SODIUM 20 MG/1
20 TABLET, DELAYED RELEASE ORAL
Status: DISCONTINUED | OUTPATIENT
Start: 2022-03-09 | End: 2022-03-10 | Stop reason: HOSPADM

## 2022-03-09 RX ORDER — WARFARIN 3 MG/1
3 TABLET ORAL DAILY
Qty: 30 TABLET | Refills: 0 | Status: SHIPPED | OUTPATIENT
Start: 2022-03-09 | End: 2022-03-09

## 2022-03-09 RX ORDER — ONDANSETRON 2 MG/ML
4 INJECTION INTRAMUSCULAR; INTRAVENOUS ONCE
Status: COMPLETED | OUTPATIENT
Start: 2022-03-09 | End: 2022-03-09

## 2022-03-09 RX ORDER — MONTELUKAST SODIUM 10 MG/1
10 TABLET ORAL DAILY
Status: DISCONTINUED | OUTPATIENT
Start: 2022-03-10 | End: 2022-03-10 | Stop reason: HOSPADM

## 2022-03-09 RX ORDER — SUCCINYLCHOLINE CHLORIDE 20 MG/ML
INJECTION INTRAMUSCULAR; INTRAVENOUS AS NEEDED
Status: DISCONTINUED | OUTPATIENT
Start: 2022-03-09 | End: 2022-03-09 | Stop reason: HOSPADM

## 2022-03-09 RX ORDER — SODIUM CHLORIDE 0.9 % (FLUSH) 0.9 %
5-40 SYRINGE (ML) INJECTION EVERY 8 HOURS
Status: DISCONTINUED | OUTPATIENT
Start: 2022-03-09 | End: 2022-03-10 | Stop reason: HOSPADM

## 2022-03-09 RX ORDER — HYDROMORPHONE HYDROCHLORIDE 2 MG/ML
0.5 INJECTION, SOLUTION INTRAMUSCULAR; INTRAVENOUS; SUBCUTANEOUS
Status: DISCONTINUED | OUTPATIENT
Start: 2022-03-09 | End: 2022-03-09

## 2022-03-09 RX ORDER — EPHEDRINE SULFATE/0.9% NACL/PF 25 MG/5 ML
SYRINGE (ML) INTRAVENOUS AS NEEDED
Status: DISCONTINUED | OUTPATIENT
Start: 2022-03-09 | End: 2022-03-09 | Stop reason: HOSPADM

## 2022-03-09 RX ORDER — DEXAMETHASONE SODIUM PHOSPHATE 4 MG/ML
INJECTION, SOLUTION INTRA-ARTICULAR; INTRALESIONAL; INTRAMUSCULAR; INTRAVENOUS; SOFT TISSUE AS NEEDED
Status: DISCONTINUED | OUTPATIENT
Start: 2022-03-09 | End: 2022-03-09 | Stop reason: HOSPADM

## 2022-03-09 RX ORDER — FENTANYL CITRATE 50 UG/ML
25 INJECTION, SOLUTION INTRAMUSCULAR; INTRAVENOUS AS NEEDED
Status: DISCONTINUED | OUTPATIENT
Start: 2022-03-09 | End: 2022-03-09 | Stop reason: HOSPADM

## 2022-03-09 RX ORDER — ROCURONIUM BROMIDE 10 MG/ML
INJECTION, SOLUTION INTRAVENOUS AS NEEDED
Status: DISCONTINUED | OUTPATIENT
Start: 2022-03-09 | End: 2022-03-09 | Stop reason: HOSPADM

## 2022-03-09 RX ORDER — PROPOFOL 10 MG/ML
INJECTION, EMULSION INTRAVENOUS AS NEEDED
Status: DISCONTINUED | OUTPATIENT
Start: 2022-03-09 | End: 2022-03-09 | Stop reason: HOSPADM

## 2022-03-09 RX ORDER — ROPIVACAINE HYDROCHLORIDE 2 MG/ML
INJECTION, SOLUTION EPIDURAL; INFILTRATION; PERINEURAL
Status: COMPLETED | OUTPATIENT
Start: 2022-03-09 | End: 2022-03-09

## 2022-03-09 RX ORDER — HYDROCHLOROTHIAZIDE 12.5 MG/1
12.5 CAPSULE ORAL DAILY
Status: DISCONTINUED | OUTPATIENT
Start: 2022-03-10 | End: 2022-03-10 | Stop reason: HOSPADM

## 2022-03-09 RX ORDER — ACETAMINOPHEN 500 MG
1000 TABLET ORAL ONCE
Status: COMPLETED | OUTPATIENT
Start: 2022-03-09 | End: 2022-03-09

## 2022-03-09 RX ORDER — FENTANYL CITRATE 50 UG/ML
INJECTION, SOLUTION INTRAMUSCULAR; INTRAVENOUS AS NEEDED
Status: DISCONTINUED | OUTPATIENT
Start: 2022-03-09 | End: 2022-03-09

## 2022-03-09 RX ORDER — FLUMAZENIL 0.1 MG/ML
0.2 INJECTION INTRAVENOUS AS NEEDED
Status: DISCONTINUED | OUTPATIENT
Start: 2022-03-09 | End: 2022-03-10 | Stop reason: HOSPADM

## 2022-03-09 RX ORDER — PREGABALIN 25 MG/1
25 CAPSULE ORAL 2 TIMES DAILY
Status: DISCONTINUED | OUTPATIENT
Start: 2022-03-09 | End: 2022-03-10 | Stop reason: HOSPADM

## 2022-03-09 RX ORDER — OXYCODONE HYDROCHLORIDE 5 MG/1
10-15 TABLET ORAL
Status: DISCONTINUED | OUTPATIENT
Start: 2022-03-09 | End: 2022-03-10 | Stop reason: HOSPADM

## 2022-03-09 RX ORDER — PREGABALIN 75 MG/1
75 CAPSULE ORAL ONCE
Status: COMPLETED | OUTPATIENT
Start: 2022-03-09 | End: 2022-03-09

## 2022-03-09 RX ORDER — WARFARIN 3 MG/1
3 TABLET ORAL DAILY
Qty: 90 TABLET | Refills: 3 | Status: SHIPPED | OUTPATIENT
Start: 2022-03-09 | End: 2022-03-30

## 2022-03-09 RX ORDER — GLYCOPYRROLATE 0.2 MG/ML
INJECTION INTRAMUSCULAR; INTRAVENOUS AS NEEDED
Status: DISCONTINUED | OUTPATIENT
Start: 2022-03-09 | End: 2022-03-09 | Stop reason: HOSPADM

## 2022-03-09 RX ORDER — ALPRAZOLAM 0.25 MG/1
0.25 TABLET ORAL
Status: DISCONTINUED | OUTPATIENT
Start: 2022-03-09 | End: 2022-03-10 | Stop reason: HOSPADM

## 2022-03-09 RX ORDER — LIDOCAINE HYDROCHLORIDE 20 MG/ML
INJECTION, SOLUTION EPIDURAL; INFILTRATION; INTRACAUDAL; PERINEURAL AS NEEDED
Status: DISCONTINUED | OUTPATIENT
Start: 2022-03-09 | End: 2022-03-09 | Stop reason: HOSPADM

## 2022-03-09 RX ORDER — SODIUM CHLORIDE 0.9 % (FLUSH) 0.9 %
5-40 SYRINGE (ML) INJECTION EVERY 8 HOURS
Status: DISCONTINUED | OUTPATIENT
Start: 2022-03-09 | End: 2022-03-09 | Stop reason: HOSPADM

## 2022-03-09 RX ORDER — AMOXICILLIN 250 MG
1 CAPSULE ORAL 2 TIMES DAILY
Status: DISCONTINUED | OUTPATIENT
Start: 2022-03-09 | End: 2022-03-10 | Stop reason: HOSPADM

## 2022-03-09 RX ORDER — ROPIVACAINE HYDROCHLORIDE 2 MG/ML
30 INJECTION, SOLUTION EPIDURAL; INFILTRATION; PERINEURAL
Status: COMPLETED | OUTPATIENT
Start: 2022-03-09 | End: 2022-03-09

## 2022-03-09 RX ORDER — MIDAZOLAM HYDROCHLORIDE 1 MG/ML
INJECTION, SOLUTION INTRAMUSCULAR; INTRAVENOUS
Status: COMPLETED | OUTPATIENT
Start: 2022-03-09 | End: 2022-03-09

## 2022-03-09 RX ORDER — SODIUM CHLORIDE 9 MG/ML
100 INJECTION, SOLUTION INTRAVENOUS CONTINUOUS
Status: DISCONTINUED | OUTPATIENT
Start: 2022-03-09 | End: 2022-03-10 | Stop reason: HOSPADM

## 2022-03-09 RX ORDER — LISINOPRIL 20 MG/1
20 TABLET ORAL DAILY
Status: DISCONTINUED | OUTPATIENT
Start: 2022-03-10 | End: 2022-03-10 | Stop reason: HOSPADM

## 2022-03-09 RX ORDER — FENTANYL CITRATE 50 UG/ML
INJECTION, SOLUTION INTRAMUSCULAR; INTRAVENOUS
Status: COMPLETED | OUTPATIENT
Start: 2022-03-09 | End: 2022-03-09

## 2022-03-09 RX ORDER — ACETAMINOPHEN 500 MG
1000 TABLET ORAL EVERY 6 HOURS
Status: DISCONTINUED | OUTPATIENT
Start: 2022-03-09 | End: 2022-03-10 | Stop reason: HOSPADM

## 2022-03-09 RX ORDER — MIDAZOLAM HYDROCHLORIDE 1 MG/ML
2 INJECTION, SOLUTION INTRAMUSCULAR; INTRAVENOUS ONCE
Status: COMPLETED | OUTPATIENT
Start: 2022-03-09 | End: 2022-03-09

## 2022-03-09 RX ORDER — SODIUM CHLORIDE 0.9 % (FLUSH) 0.9 %
5-40 SYRINGE (ML) INJECTION AS NEEDED
Status: DISCONTINUED | OUTPATIENT
Start: 2022-03-09 | End: 2022-03-09 | Stop reason: HOSPADM

## 2022-03-09 RX ORDER — LISINOPRIL AND HYDROCHLOROTHIAZIDE 12.5; 2 MG/1; MG/1
1 TABLET ORAL DAILY
Status: DISCONTINUED | OUTPATIENT
Start: 2022-03-10 | End: 2022-03-09 | Stop reason: CLARIF

## 2022-03-09 RX ORDER — CLINDAMYCIN HYDROCHLORIDE 300 MG/1
300 CAPSULE ORAL 3 TIMES DAILY
Qty: 9 CAPSULE | Refills: 0 | Status: SHIPPED | OUTPATIENT
Start: 2022-03-09 | End: 2022-03-12

## 2022-03-09 RX ORDER — NALOXONE HYDROCHLORIDE 0.4 MG/ML
0.4 INJECTION, SOLUTION INTRAMUSCULAR; INTRAVENOUS; SUBCUTANEOUS AS NEEDED
Status: DISCONTINUED | OUTPATIENT
Start: 2022-03-09 | End: 2022-03-10 | Stop reason: HOSPADM

## 2022-03-09 RX ORDER — OXYCODONE HYDROCHLORIDE 10 MG/1
20 TABLET ORAL
Status: DISCONTINUED | OUTPATIENT
Start: 2022-03-09 | End: 2022-03-10 | Stop reason: HOSPADM

## 2022-03-09 RX ADMIN — ACETAMINOPHEN 1000 MG: 500 TABLET ORAL at 23:59

## 2022-03-09 RX ADMIN — SODIUM CHLORIDE, SODIUM LACTATE, POTASSIUM CHLORIDE, AND CALCIUM CHLORIDE 25 ML/HR: 600; 310; 30; 20 INJECTION, SOLUTION INTRAVENOUS at 09:25

## 2022-03-09 RX ADMIN — Medication 3 MG: at 12:29

## 2022-03-09 RX ADMIN — DEXMEDETOMIDINE HYDROCHLORIDE 2 MCG: 100 INJECTION, SOLUTION, CONCENTRATE INTRAVENOUS at 11:09

## 2022-03-09 RX ADMIN — DEXMEDETOMIDINE HYDROCHLORIDE 10 MCG: 100 INJECTION, SOLUTION, CONCENTRATE INTRAVENOUS at 11:17

## 2022-03-09 RX ADMIN — SODIUM CHLORIDE, SODIUM LACTATE, POTASSIUM CHLORIDE, AND CALCIUM CHLORIDE: 600; 310; 30; 20 INJECTION, SOLUTION INTRAVENOUS at 10:43

## 2022-03-09 RX ADMIN — KETOROLAC TROMETHAMINE 15 MG: 15 INJECTION, SOLUTION INTRAMUSCULAR; INTRAVENOUS at 14:17

## 2022-03-09 RX ADMIN — ONDANSETRON 4 MG: 2 INJECTION INTRAMUSCULAR; INTRAVENOUS at 13:25

## 2022-03-09 RX ADMIN — FENTANYL CITRATE 25 MCG: 50 INJECTION, SOLUTION INTRAMUSCULAR; INTRAVENOUS at 13:25

## 2022-03-09 RX ADMIN — SUCCINYLCHOLINE CHLORIDE 100 MG: 20 INJECTION, SOLUTION INTRAMUSCULAR; INTRAVENOUS at 10:50

## 2022-03-09 RX ADMIN — SODIUM CHLORIDE, PRESERVATIVE FREE 10 ML: 5 INJECTION INTRAVENOUS at 17:25

## 2022-03-09 RX ADMIN — ROCURONIUM BROMIDE 45 MG: 50 INJECTION INTRAVENOUS at 10:54

## 2022-03-09 RX ADMIN — TRANEXAMIC ACID 1 G: 100 INJECTION, SOLUTION INTRAVENOUS at 12:29

## 2022-03-09 RX ADMIN — GLYCOPYRROLATE 0.4 MG: 0.2 INJECTION INTRAMUSCULAR; INTRAVENOUS at 12:29

## 2022-03-09 RX ADMIN — MIDAZOLAM 2 MG: 1 INJECTION INTRAMUSCULAR; INTRAVENOUS at 10:01

## 2022-03-09 RX ADMIN — ACETAMINOPHEN 1000 MG: 500 TABLET ORAL at 17:28

## 2022-03-09 RX ADMIN — KETOROLAC TROMETHAMINE 15 MG: 15 INJECTION, SOLUTION INTRAMUSCULAR; INTRAVENOUS at 17:23

## 2022-03-09 RX ADMIN — LIDOCAINE HYDROCHLORIDE 100 MG: 20 INJECTION, SOLUTION EPIDURAL; INFILTRATION; INTRACAUDAL; PERINEURAL at 10:49

## 2022-03-09 RX ADMIN — PANTOPRAZOLE SODIUM 20 MG: 20 TABLET, DELAYED RELEASE ORAL at 17:23

## 2022-03-09 RX ADMIN — PREGABALIN 25 MG: 25 CAPSULE ORAL at 17:28

## 2022-03-09 RX ADMIN — ROPIVACAINE HYDROCHLORIDE 30 ML: 2 INJECTION, SOLUTION EPIDURAL; INFILTRATION at 10:03

## 2022-03-09 RX ADMIN — ROPIVACAINE HYDROCHLORIDE 50 MG: 2 INJECTION, SOLUTION EPIDURAL; INFILTRATION at 10:08

## 2022-03-09 RX ADMIN — LABETALOL HYDROCHLORIDE 100 MG: 100 TABLET, FILM COATED ORAL at 17:28

## 2022-03-09 RX ADMIN — FENTANYL CITRATE 50 MCG: 50 INJECTION, SOLUTION INTRAMUSCULAR; INTRAVENOUS at 10:52

## 2022-03-09 RX ADMIN — ONDANSETRON 4 MG: 2 INJECTION INTRAMUSCULAR; INTRAVENOUS at 15:14

## 2022-03-09 RX ADMIN — DEXMEDETOMIDINE HYDROCHLORIDE 4 MCG: 100 INJECTION, SOLUTION, CONCENTRATE INTRAVENOUS at 11:36

## 2022-03-09 RX ADMIN — Medication 5 MG: at 11:28

## 2022-03-09 RX ADMIN — DEXMEDETOMIDINE HYDROCHLORIDE 4 MCG: 100 INJECTION, SOLUTION, CONCENTRATE INTRAVENOUS at 12:19

## 2022-03-09 RX ADMIN — DEXMEDETOMIDINE HYDROCHLORIDE 4 MCG: 100 INJECTION, SOLUTION, CONCENTRATE INTRAVENOUS at 11:54

## 2022-03-09 RX ADMIN — ROCURONIUM BROMIDE 5 MG: 50 INJECTION INTRAVENOUS at 10:49

## 2022-03-09 RX ADMIN — DEXAMETHASONE SODIUM PHOSPHATE 4 MG: 4 INJECTION, SOLUTION INTRAMUSCULAR; INTRAVENOUS at 10:49

## 2022-03-09 RX ADMIN — ACETAMINOPHEN 1000 MG: 500 TABLET ORAL at 09:45

## 2022-03-09 RX ADMIN — FENTANYL CITRATE 25 MCG: 50 INJECTION, SOLUTION INTRAMUSCULAR; INTRAVENOUS at 13:35

## 2022-03-09 RX ADMIN — OXYCODONE HYDROCHLORIDE 10 MG: 5 TABLET ORAL at 20:21

## 2022-03-09 RX ADMIN — CLINDAMYCIN PHOSPHATE 900 MG: 150 INJECTION, SOLUTION INTRAVENOUS at 17:31

## 2022-03-09 RX ADMIN — DOCUSATE SODIUM 50MG AND SENNOSIDES 8.6MG 1 TABLET: 8.6; 5 TABLET, FILM COATED ORAL at 17:29

## 2022-03-09 RX ADMIN — SODIUM CHLORIDE 100 ML/HR: 9 INJECTION, SOLUTION INTRAVENOUS at 17:44

## 2022-03-09 RX ADMIN — FERROUS SULFATE TAB 325 MG (65 MG ELEMENTAL FE) 325 MG: 325 (65 FE) TAB at 17:23

## 2022-03-09 RX ADMIN — TRANEXAMIC ACID 1 G: 100 INJECTION, SOLUTION INTRAVENOUS at 11:00

## 2022-03-09 RX ADMIN — DEXMEDETOMIDINE HYDROCHLORIDE 4 MCG: 100 INJECTION, SOLUTION, CONCENTRATE INTRAVENOUS at 11:11

## 2022-03-09 RX ADMIN — DEXMEDETOMIDINE HYDROCHLORIDE 4 MCG: 100 INJECTION, SOLUTION, CONCENTRATE INTRAVENOUS at 11:51

## 2022-03-09 RX ADMIN — Medication 2.5 MG: at 12:11

## 2022-03-09 RX ADMIN — PREGABALIN 75 MG: 75 CAPSULE ORAL at 09:45

## 2022-03-09 RX ADMIN — ONDANSETRON 4 MG: 2 INJECTION INTRAMUSCULAR; INTRAVENOUS at 12:18

## 2022-03-09 RX ADMIN — FENTANYL CITRATE 50 MCG: 50 INJECTION, SOLUTION INTRAMUSCULAR; INTRAVENOUS at 11:21

## 2022-03-09 RX ADMIN — ROCURONIUM BROMIDE 10 MG: 50 INJECTION INTRAVENOUS at 11:38

## 2022-03-09 RX ADMIN — DEXMEDETOMIDINE HYDROCHLORIDE 4 MCG: 100 INJECTION, SOLUTION, CONCENTRATE INTRAVENOUS at 11:34

## 2022-03-09 RX ADMIN — FENTANYL CITRATE 100 MCG: 50 INJECTION, SOLUTION INTRAMUSCULAR; INTRAVENOUS at 10:08

## 2022-03-09 RX ADMIN — FENTANYL CITRATE 100 MCG: 50 INJECTION, SOLUTION INTRAMUSCULAR; INTRAVENOUS at 10:01

## 2022-03-09 RX ADMIN — DEXMEDETOMIDINE HYDROCHLORIDE 4 MCG: 100 INJECTION, SOLUTION, CONCENTRATE INTRAVENOUS at 11:58

## 2022-03-09 RX ADMIN — PROPOFOL 170 MG: 10 INJECTION, EMULSION INTRAVENOUS at 10:49

## 2022-03-09 RX ADMIN — MIDAZOLAM HYDROCHLORIDE 2 MG: 2 INJECTION, SOLUTION INTRAMUSCULAR; INTRAVENOUS at 10:08

## 2022-03-09 RX ADMIN — DEXMEDETOMIDINE HYDROCHLORIDE 4 MCG: 100 INJECTION, SOLUTION, CONCENTRATE INTRAVENOUS at 10:55

## 2022-03-09 RX ADMIN — SODIUM CHLORIDE, PRESERVATIVE FREE 10 ML: 5 INJECTION INTRAVENOUS at 21:43

## 2022-03-09 RX ADMIN — CLINDAMYCIN 900 MG: 150 INJECTION, SOLUTION INTRAMUSCULAR; INTRAVENOUS at 10:49

## 2022-03-09 NOTE — BRIEF OP NOTE
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 Procedure(s):  LEFT KNEE REVISION/DAVID/GUEVARA TO ASSIST/NERVE BLOCK    Surgeon(s):  Terry Garcia MD    Surgical Assistant: Physician Assistant: Jese Velez PA-C  Surg Asst-1: So Guzmán    Anesthesia: General     Estimated Blood Loss (mL): less than 50     Complications: None    Specimens: * No specimens in log *     Implants:   Implant Name Type Inv.  Item Serial No.  Lot No. LRB No. Used Action   CEMENT BNE 20ML 41GM FULL DOSE PMMA W/ TOBRA M VISC RADPQ - [id number]  CEMENT BNE 20ML 41GM FULL DOSE PMMA W/ TOBRA M VISC RADPQ  [employer] [id number] Left 1 Implanted   CEMENT BNE 20ML 41GM FULL DOSE PMMA W/ TOBRA M VISC RADPQ - [id number]  CEMENT BNE 20ML 41GM FULL DOSE PMMA W/ TOBRA M VISC RADPQ  [employer] [facility]  Left 1 Implanted   CEMENT BNE 20ML 41GM FULL DOSE PMMA W/ TOBRA M VISC RADPQ - [id number]  CEMENT BNE 20ML 41GM FULL DOSE PMMA W/ TOBRA M VISC RADPQ  [employer] [facility]  Left 1 Implanted   CEMENT BNE 20ML 41GM FULL DOSE PMMA W/ TOBRA M VISC RADPQ - [id number]  CEMENT BNE 20ML 41GM FULL DOSE PMMA W/ TOBRA M VISC RADPQ  [employer] [facility] [id number] Left 1 Implanted   CEMENT BNE 20ML 41GM FULL DOSE PMMA W/ TOBRA M VISC RADPQ - [id number]  CEMENT BNE 20ML 41GM FULL DOSE PMMA W/ TOBRA M VISC RADPQ  [employer] [facility] [id number] Left 1 Implanted   RESTRICTOR MICHELL OD30MM UNIV REV POLYMER IM INSRT DISPOSABLE - [id number]  RESTRICTOR MICHELL OD30MM UNIV REV POLYMER IM INSRT DISPOSABLE  [employer] [facility] [id number] Left 1 Implanted   COMPONENT FEM SZ 5 L KNEE TOT ST TRIATHLON - [id number]  COMPONENT FEM SZ 5 L KNEE TOT STBL TRIATHLON  [employer] HOWM_WD [id number] Left 1 Implanted   AUGMENT FEM SZ 5 [id number] L DST KNEE CO CHROM TOT STBL FULL - [id number]  AUGMENT FEM SZ 5 [id number] L DST KNEE CO CHROM TOT STBL FULL  [employer] HOW_ [id number] Left 1 Implanted   AUGMENT FEM SZ 5 [id number] L DST KNEE CO CHROM TOT STBL FULL - [id number]  AUGMENT FEM SZ 5 [id number] L DST KNEE CO CHROM TOT STBL FULL  [employer] HOW_WD [id number] Left 1 Implanted   AUGMENT FEM SZ 5 [id number] POST KNEE CO CHROM TOT STBL FULL - [id number]  AUGMENT FEM SZ 5 [id number] POST KNEE CO CHROM TOT STBL FULL  [employer] HOW_ [id number] Left 1 Implanted   AUGMENT FEM SZ 5 [id number] POST KNEE CO CHROM TOT STBL FULL - [id number]  AUGMENT FEM SZ 5 [id number] POST KNEE CO CHROM TOT STBL FULL  [employer] HOW_ [id number] Left 1 Implanted   STEM TRIATHLON MICHELL [id number] --  - [id number]  STEM TRIATHLON MICHELL [id number] --   [employer] HOW_ [id number] Left 1 Implanted   INSERT TIB POST STBL SZ 5 9MM -- TRIATHLON X3 - [id number]  INSERT TIB POST STBL SZ 5 9MM -- TRIATHLON X3  [employer] HOW_ [id number] Left 1 Implanted       Drains:   [REMOVED] Abdiaziz-Larios Drain 03/27/18 Left Knee (Removed)       [REMOVED] Orogastric Tube 11/12/18 (Removed)       [REMOVED] Rectal Tube (Removed)       Findings: same    Electronically Signed by Porsha Fallon MD on 3/9/2022 at 12:31 PM

## 2022-03-09 NOTE — DISCHARGE SUMMARY
3/9/2022  8:18 AM    3/10/2022, 1:00 PM    Primary Dx:left Orthopedic / Rheumatologic: Total Knee Replacement  Secondary Dx: Etiological Diagnoses: none    HPI:  Pt had a previous TKA and was found to have femoral loosening. Workup for infection was negative. Due to the current findings and affected activity of daily living surgical intervention is indicated. The alternatives, risks, complications as well as expected outcome were discussed, the patient understands and wishes to proceed with surgery    Past Medical History:   Diagnosis Date    Arthritis     Chronic pain     knee and shoulder    DVT (deep venous thrombosis) ([facility] [address] 1992    post sx. R LEG    [facility]VT [facility]) ([facility]) 2000    POST BACK SX. 2- LEFT LEG    GERD (gastroesophageal reflux disease)     Headache(784.0)     everyday    High cholesterol     Hypertension     Prostate cancer ([facility] [address] 2018    Pure hypercholesterolemia     Spinal stenosis     Thromboembolus (HCC)          Current Facility-Administered Medications:     sodium chloride (NS) flush 5-40 mL, 5-40 mL, IntraVENous, Q8H, Landen Harrison, CRNA    sodium chloride (NS) flush 5-40 mL, 5-40 mL, IntraVENous, PRN, Landen Harrison, CRNA    fentaNYL citrate (PF) injection 25 mcg, 25 mcg, IntraVENous, PRN, Landen Harrison, CRNA    HYDROmorphone (DILAUDID) injection 0.5 mg, 0.5 mg, IntraVENous, Multiple, Landen Harrison, CRNA    ondansetron [facility]) injection 4 mg, 4 mg, IntraVENous, ONCE, Landen Harrison, CRNA    Amoxicillin, Augmentin [amoxicillin-pot clavulanate], Chlorhexidine towelette, Hibiclens [chlorhexidine gluconate], Milk containing products, Nsaids (non-steroidal anti-inflammatory drug), Penicillins, and Requip [ropinirole]    Physical Exam:  General A&O x3 NAD, well developed, well nourished, normal affect  Heart: S1-S2, RRR  Lungs: CTA Bilat  Abd: soft NT, ND  Ext: n/v intact    Hospital Course:    Pt.  Had leftOrthopedic / Rheumatologic: Total Knee Replacement    Post -op Course: The patient tolerated the procedure well. They were followed by internal medicine for help with medical management. Pt. Was place on Abx pre and post-op for prophylaxis against infection as well as coumadin pre and post-op for prophylaxis against DVT. Vitals signs remained stable, remained af. The wound wasclean, dry, no drainage. Pain was well controlled. Pt. Had negative calf tenderness or swelling, no evidence for DVT. Patient had PT/OT consult for evaluation and treatment. CBC  Lab Results   Component Value Date/Time    WBC 6.8 02/28/2022 10:35 AM    RBC 4.29 (L) 02/28/2022 10:35 AM    HCT 40.4 02/28/2022 10:35 AM    MCV 94.2 02/28/2022 10:35 AM    MCH 31.7 02/28/2022 10:35 AM    MCHC 33.7 02/28/2022 10:35 AM    RDW 13.1 02/28/2022 10:35 AM     Coagulation  Lab Results   Component Value Date    INR 1.1 03/09/2022    APTT 28.8 03/09/2022      Basic Metabolic Profile  Lab Results   Component Value Date     02/28/2022    CO2 29 02/28/2022    BUN 11 02/28/2022       Discharge Meds:  Current Discharge Medication List      START taking these medications    Details   oxyCODONE-acetaminophen (Percocet)  mg per tablet Take 1-2 Tablets by mouth every six (6) hours as needed for Pain for up to 7 days. Max Daily Amount: 8 Tablets. Qty: 56 Tablet, Refills: 0    Associated Diagnoses: Failure of total knee replacement, subsequent encounter      docusate sodium (Colace) 100 mg capsule Take 1 Capsule by mouth two (2) times a day for 90 days. Qty: 60 Capsule, Refills: 2    Associated Diagnoses: Failure of total knee replacement, subsequent encounter      clindamycin (CLEOCIN) 300 mg capsule Take 1 Capsule by mouth three (3) times daily for 3 days.   Qty: 9 Capsule, Refills: 0    Associated Diagnoses: Failure of total knee replacement, subsequent encounter      !! warfarin (COUMADIN) 3 mg tablet TAKE 1 TABLET BY MOUTH DAILY FOR 21 DAYS  Qty: 90 Tablet, Refills: 3 Comments: **Patient requests 90 days supply**  Associated Diagnoses: Failure of total knee replacement, subsequent encounter       !! - Potential duplicate medications found. Please discuss with provider. CONTINUE these medications which have NOT CHANGED    Details   clotrimazole-betamethasone (LOTRISONE) 1-0.05 % lotion Apply  to affected area two (2) times a day. Qty: 30 mL, Refills: 0    Associated Diagnoses: Skin infection of left knee      allopurinoL (ZYLOPRIM) 100 mg tablet Take 1 Tablet by mouth two (2) times a day. Qty: 60 Tablet, Refills: 0    Associated Diagnoses: Gout, unspecified cause, unspecified chronicity, unspecified site      L gasseri/B bifidum/B longum ([facility] [address]) Take  by mouth nightly. lansoprazole (PREVACID) 30 mg capsule Take 30 mg by mouth daily. !! warfarin (COUMADIN) 5 mg tablet TAKE 2 AND 1/2 TABLETS BY MOUTH DAILY OR AS DIRECTED  Qty: 75 Tab, Refills: 0      lisinopril-hydroCHLOROthiazide (PRINZIDE, ZESTORETIC) 20-12.5 mg per tablet TAKE 1 TABLET BY MOUTH DAILY  Qty: 90 Tab, Refills: 1      labetalol (NORMODYNE) 100 mg tablet TAKE 1 TABLET BY MOUTH TWICE DAILY. STOP VERAPAMIL  Qty: 180 Tab, Refills: 0      butalbital-acetaminophen-caff (FIORICET) -40 mg per capsule 2 cap three times per day as needed for headache  Qty: 180 Cap, Refills: 1      ALPRAZolam (XANAX) 0.5 mg tablet Take one half(1/2) tab to one(1) tab by mouth at bedtime as needed for sleep  Qty: 30 Tab, Refills: 0    Associated Diagnoses: Primary insomnia      montelukast (SINGULAIR) 10 mg tablet TAKE 1 TABLET BY MOUTH EVERY DAY  Qty: 90 Tab, Refills: 0      acetaminophen (TYLENOL) 500 mg tablet Take 1,000 mg by mouth every six (6) hours as needed for Pain. ondansetron hcl (Zofran) 4 mg tablet Take 1 Tablet by mouth every eight (8) hours as needed for Nausea. Qty: 12 Tablet, Refills: 0      gabapentin (NEURONTIN) 100 mg capsule Take 2 Capsules by mouth nightly.  Max Daily Amount: 200 mg. Qty: 180 Capsule, Refills: 0    Associated Diagnoses: Facet arthropathy, lumbar; Spinal stenosis of lumbar region without neurogenic claudication; Neuropathy       !! - Potential duplicate medications found. Please discuss with provider. STOP taking these medications       diclofenac (VOLTAREN) 1 % gel Comments:   Reason for Stopping:         enoxaparin (Lovenox) 30 mg/0.3 mL injection Comments:   Reason for Stopping:         enoxaparin (LOVENOX) 40 mg/0.4 mL Comments:   Reason for Stopping:         metaxalone (Skelaxin) 800 mg tablet Comments:   Reason for Stopping:               Discharge Plan:  The patient will be d/c'd to home, total knee protocol, WBAT. he will have [facility] PT and nursing. Total joint protocol. Pt safe for homebound transfer, sp Total joint replacement. A walker, bedside commode, and shower chair will be utilized for ADL's. Follow up with Dr. Josefa Spann in 10-12 days. Call with any questions or concerns.

## 2022-03-09 NOTE — ANESTHESIA PROCEDURE NOTES
Peripheral Block    Start time: 3/9/2022 10:00 AM  End time: 3/9/2022 10:07 AM  Performed by: Skyler Cox MD  Authorized by: Skyler Cox MD       Pre-procedure: Indications: at surgeon's request and post-op pain management    Preanesthetic Checklist: patient identified, risks and benefits discussed, site marked, timeout performed, anesthesia consent given and patient being monitored    Timeout Time: 10:00 EST          Block Type:   Block Type:   Adductor canal block  Laterality:  Left  Monitoring:  Standard ASA monitoring, responsive to questions, oxygen, continuous pulse ox, frequent vital sign checks and heart rate  Injection Technique:  Single shot  Procedures: ultrasound guided    Patient Position: supine  Prep: chlorhexidine    Location:  Lower thigh  Needle Type:  Stimuplex  Needle Gauge:  20 G  Needle Localization:  Ultrasound guidance  Medication Injected:  FentaNYL citrate (PF) injection sedation initial, 100 mcg  midazolam (VERSED) injection, 2 mg  ropivacaine (NAROPIN) 2 mg/mL (0.2 %) injection, 50 mg  Med Admin Time: 3/9/2022 10:08 AM    Assessment:  Number of attempts:  1  Injection Assessment:  Incremental injection every 5 mL, negative aspiration for CSF, no paresthesia, ultrasound image on chart, no intravascular symptoms, negative aspiration for blood and local visualized surrounding nerve on ultrasound  Patient tolerance:  Patient tolerated the procedure well with no immediate complications

## 2022-03-09 NOTE — PROGRESS NOTES
Problem: Mobility Impaired (Adult and Pediatric)  Goal: *Acute Goals and Plan of Care (Insert Text)  Description: Physical Therapy Goals  Initiated 3/9/2022 and to be accomplished within 7 day(s)  1. Patient will move from supine to sit and sit to supine , scoot up and down, and roll side to side in bed with modified independence. 2.  Patient will transfer from bed to chair and chair to bed with modified independence using the least restrictive device. 3.  Patient will perform sit to stand with modified independence. 4.  Patient will ambulate with modified independence for 150 feet with the least restrictive device. 5.  Patient will ascend/descend 3 stairs with 0 handrail(s) with minimal assistance/contact guard assist.     PLOF: Pt reporting he lives with wife in 1 story house with 3 TE with NO HANDRAILS. Pt using cane at home PTA. Outcome: Progressing Towards Goal     PHYSICAL THERAPY EVALUATION    Patient: Cecilia Yan (74 y.o. male)  Date: 3/9/2022  Primary Diagnosis: Failed total knee replacement ([facility] [address] [T84.018A, Z96.659]  Procedure(s) (LRB):  LEFT KNEE REVISION/DAVID/GUEVARA TO ASSIST/NERVE BLOCK (Left) Day of Surgery   Precautions:  WBAT (LLE )  ASSESSMENT :  Pt cleared to participate in PT session, pt received semi-reclined in bed and agreeable to therapy session with wife at bedside. Based on the objective data described below, the patient presents with decreased endurance, decreased strength, decreased balance reactions, gait deviations, increased pain, and decreased independence in functional mobility. Patient is 75 yo male admitted to hospital for TKA. Patient was educated on weight bearing status, role of therapy, TE (see below), and equipment in room including role of SCDs, towel roll, and ice sleeve. Patient demonstrated fair SLR and transferred to sitting EOB for objective assessment with Gypsy. Patient was given demo with instruction on sit <> stand transfer and gait training. Patient transferred to standing with Gypsy and ambulated x8 feet with CGA. Pt demonstrating decreased step length and weight shift to RLE. At conclusion of session patient transferred to sitting in recliner and was left resting with call bell by the side, and towel roll under ankle. Pt positioned for comfort and educated to call for assist before getting up, pt verbalized understanding. Pt left with all needs met and call bell in reach. RN notified of position and participation. Pt not yet safe for discharge home will need further ambulation and stair training. Per pt, his steps to enter his home do not have handrails. Would benefit from axillary crutch trial to enter home. Patient will benefit from skilled intervention to address the above impairments. Patient's rehabilitation potential is considered to be Good  Factors which may influence rehabilitation potential include:   [x]         None noted  []         Mental ability/status  []         Medical condition  []         Home/family situation and support systems  []         Safety awareness  []         Pain tolerance/management  []         Other:      PLAN :  Recommendations and Planned Interventions:   [x]           Bed Mobility Training             []    Neuromuscular Re-Education  [x]           Transfer Training                   []    Orthotic/Prosthetic Training  [x]           Gait Training                          []    Modalities  [x]           Therapeutic Exercises           []    Edema Management/Control  [x]           Therapeutic Activities            [x]    Family Training/Education  [x]           Patient Education  []           Other (comment):    Frequency/Duration: Patient will be followed by physical therapy 1-2 times per day/4-7 days per week to address goals.   Discharge Recommendations: Home Health with family support   Further Equipment Recommendations for Discharge: rolling walker     SUBJECTIVE:   Patient stated I think I used crutches last time.     OBJECTIVE DATA SUMMARY:     Past Medical History:   Diagnosis Date    Arthritis     Chronic pain     knee and shoulder    DVT (deep venous thrombosis) ([facility] [address] 1992    post sx. R LEG    DVT (deep venous thrombosis) ([facility]) 2000    POST BACK SX. 2- LEFT LEG    GERD (gastroesophageal reflux disease)     Headache(784.0)     everyday    High cholesterol     Hypertension     Prostate cancer ([facility] [address] 2018    Pure hypercholesterolemia     Spinal stenosis     Thromboembolus ([facility] [address]      Past Surgical History:   Procedure Laterality Date    HX HEENT Right 09/11/2018    eye surgery, macular     HX KNEE REPLACEMENT Left 2018    HX LUMBAR LAMINECTOMY  1992    HX LUMBAR LAMINECTOMY  2000    HX ORTHOPAEDIC  06-25-12    Right foot with excision of bursa and adipose tissue from fifth metatarsal base by Dr. Hedy Laboy PROSTATECTOMY  11/2018    HX ROTATOR CUFF REPAIR Right 01/28/2019    by Dr. Mohit Benito SHOULDER ARTHROSCOPY Left 12/2020    WORK RELATED INJURY     Barriers to Learning/Limitations: None  Compensate with: N/A  Home Situation:  Home Situation  Home Environment: Private residence  # Steps to Enter: 3  Rails to Enter: No  Wheelchair Ramp: No  One/Two Story Residence: One story  Living Alone: No  Support Systems: Spouse/Significant Other  Patient Expects to be Discharged to[de-identified] Home  Current DME Used/Available at Home: [facility], straight,Walker, rolling,Shower chair  Tub or Shower Type: Tub/Shower combination  Critical Behavior:  Neurologic State: Alert  Orientation Level: Oriented X4  Cognition: Appropriate decision making  Safety/Judgement: Awareness of environment; Fall prevention  Psychosocial  Patient Behaviors: Calm; Cooperative  Skin Condition/Temp: Dry;Warm     Skin Integrity: Incision (comment) (left knee incision)  Skin Integumentary  Skin Color: Appropriate for ethnicity  Skin Condition/Temp: Dry;Warm  Skin Integrity: Incision (comment) (left knee incision)     Strength:    Strength: Generally decreased, functional (LLE, RLE WNL )                    Tone & Sensation:   Tone: Normal              Sensation: Intact               Range Of Motion:  AROM: Within functional limits                       Posture:  Posture (WDL): Within defined limits     Functional Mobility:  Bed Mobility:     Supine to Sit: Minimum assistance (for LLE management )     Scooting: Contact guard assistance  Transfers:  Sit to Stand: Minimum assistance  Stand to Sit: Contact guard assistance                       Balance:   Sitting: Intact  Standing: Impaired; With support  Standing - Static: Fair  Standing - Dynamic : Fair  Wheelchair Mobility:        Ambulation/Gait Training:  Distance (ft): 8 Feet (ft)  Assistive Device: Walker, rolling  Ambulation - Level of Assistance: Contact guard assistance     Gait Description (WDL): Exceptions to WDL  Gait Abnormalities: Decreased step clearance     Left Side Weight Bearing: As tolerated  Base of Support: Center of gravity altered;Narrowed; Shift to right     Speed/Veronica: Slow;Shuffled  Step Length: Right shortened;Left shortened    Therapeutic Exercises:   Instructed in and completed ankle pumps, LAQ, and quad set at EOB and in recliner. Pain:  Pain level pre-treatment: 5/10   Pain level post-treatment: 5/10   Pain Intervention(s) : Rest, Ice, Repositioning  Response to intervention: Nurse notified    Activity Tolerance:   Fair activity tolerance     Please refer to the flowsheet for vital signs taken during this treatment. After treatment:   [x]         Patient left in no apparent distress sitting up in chair  []         Patient left in no apparent distress in bed  [x]         Call bell left within reach  [x]         Nursing notified  []         Caregiver present  []         Bed alarm activated  []         SCDs applied    COMMUNICATION/EDUCATION:   [x]         Role of Physical Therapy in the acute care setting.   [x]         Fall prevention education was provided and the patient/caregiver indicated understanding. [x]         Patient/family have participated as able in goal setting and plan of care. [x]         Patient/family agree to work toward stated goals and plan of care. []         Patient understands intent and goals of therapy, but is neutral about his/her participation. []         Patient is unable to participate in goal setting/plan of care: ongoing with therapy staff.  []         Other:     Thank you for this referral.  Gavin Strong, PT   Time Calculation: 31 mins      Eval Complexity: History: MEDIUM  Complexity : 1-2 comorbidities / personal factors will impact the outcome/ POC Exam:LOW Complexity : 1-2 Standardized tests and measures addressing body structure, function, activity limitation and / or participation in recreation  Presentation: LOW Complexity : Stable, uncomplicated  Clinical Decision Making:Low Complexity low  Overall Complexity:LOW

## 2022-03-09 NOTE — PROGRESS NOTES
Lisinopril 20 mg PLUS Hydrochlorothiazide 12.5 mg po daily was therapeutically interchanged for Lisinopril-Hydrchlorothiazide 20-12.5 mg po daily per the P&T Committee approved Therapeutic Interchanges Policy.

## 2022-03-09 NOTE — PROGRESS NOTES
CM called and spoke with Zarina Harmon with [facility], they can accept patient. CM put patient in the queue for [facility].             Armin Cordero RN  Case Management [phone]

## 2022-03-09 NOTE — ADDENDUM NOTE
Addendum  created 03/09/22 1308 by Rose Pugh MD    Attestation recorded in [facility] 97 filed

## 2022-03-09 NOTE — ANESTHESIA POSTPROCEDURE EVALUATION
Procedure(s):  LEFT KNEE REVISION/DAVID/GUEVARA TO ASSIST/NERVE BLOCK.    general    Anesthesia Post Evaluation      Multimodal analgesia: multimodal analgesia used between 6 hours prior to anesthesia start to PACU discharge  Patient location during evaluation: bedside  Patient participation: complete - patient participated  Level of consciousness: awake  Pain management: adequate  Airway patency: patent  Anesthetic complications: no  Cardiovascular status: stable  Respiratory status: acceptable  Hydration status: acceptable  Post anesthesia nausea and vomiting:  controlled  Final Post Anesthesia Temperature Assessment:  Normothermia (36.0-37.5 degrees C)      INITIAL Post-op Vital signs:   Vitals Value Taken Time   BP 99/54 03/09/22 1303   Temp 36.8 °C (98.2 °F) 03/09/22 1254   Pulse 78 03/09/22 1305   Resp 14 03/09/22 1305   SpO2 97 % 03/09/22 1305   Vitals shown include unvalidated device data.

## 2022-03-09 NOTE — ANESTHESIA PREPROCEDURE EVALUATION
Relevant Problems   No relevant active problems       Anesthetic History   No history of anesthetic complications            Review of Systems / Medical History  Patient summary reviewed and pertinent labs reviewed    Pulmonary                Comments: H/o bronchitis   Neuro/Psych             Comments: Chronic pain Cardiovascular    Hypertension          Hyperlipidemia    Exercise tolerance: <4 METS  Comments: H/o DVT  EF 61%   GI/Hepatic/Renal     GERD           Endo/Other        Obesity, arthritis and cancer     Other Findings              Physical Exam    Airway  Mallampati: II  TM Distance: > 6 cm  Neck ROM: normal range of motion   Mouth opening: Normal     Cardiovascular  Regular rate and rhythm,  S1 and S2 normal,  no murmur, click, rub, or gallop             Dental    Dentition: Poor dentition     Pulmonary  Breath sounds clear to auscultation               Abdominal  GI exam deferred       Other Findings            Anesthetic Plan    ASA: 3  Anesthesia type: general      Post-op pain plan if not by surgeon: peripheral nerve block single    Induction: Intravenous  Anesthetic plan and risks discussed with: Patient

## 2022-03-09 NOTE — PROGRESS NOTES
Reason for Admission:  Failed total knee replacement ([facility] [address] [T84.018A, Z96.659]                 RUR Score:    10%            Plan for utilizing home health:    Yes, verbal consent given for [facility]. Likelihood of Readmission:   LOW                         Transition of Care Plan:              Initial assessment completed with spouse/SO, patient wife Renay Cherry. Cognitive status of patient: oriented to time, place, person and situation. Face sheet information confirmed:  yes. The patient's wife Renay Cherry [phone] agrees to participate in his discharge plan and to receive any needed information. This patient lives in a single family home with his wife, with 3 steps to enter. Patient is able to navigate steps as needed. Prior to hospitalization, patient was considered to be independent with ADLs/IADLS : yes . Patient has a current ACP document on file: no.       Healthcare Decision Maker:   Primary Decision Maker: Susan Winters health update and  - Spouse - [phone]    Click here to complete [address] including selection of the Healthcare Decision Maker Relationship (ie \"Primary\")    The patient's wife will be available to transport patient home upon discharge. The patient already has [employer],  medical equipment available in the home. Patient is not currently active with home health. Patient has not stayed in a skilled nursing facility or rehab. This patient is on dialysis :no.    List of available Home Health agencies were provided and reviewed with the patient prior to discharge. Christmas of choice verbal consent given: yes, for [facility]. Currently, the discharge plan is Home with [address] [place] [address] [facility]. The patient states that he can obtain his medications from the pharmacy, and take his medications as directed.     Patient's current insurance is [employer]. Care Management Interventions  PCP Verified by CM:  Yes  Mode of Transport at Discharge: Self (Patient's wife will be transporting patient home at time of discharge. )  Transition of Care Consult (CM Consult): [address]: Yes  Discharge Durable Medical Equipment: No  Physical Therapy Consult: Yes  Occupational Therapy Consult: Yes  Speech Therapy Consult: No  Support Systems: Spouse/Significant Other (Patient lives with his wife. )  Confirm Follow Up Transport: Family  The Plan for Transition of Care is Related to the Following Treatment Goals : Home with [address] [place] [address] [facility]  The Patient and/or Patient Representative was Provided with a Choice of Provider and Agrees with the Discharge Plan?: Yes  Name of the Patient Representative Who was Provided with a Choice of Provider and Agrees with the Discharge Plan: Carla Valdivia (Patient's wife)  Freedom of Choice List was Provided with Basic Dialogue that Supports the Patient's Individualized Plan of Care/Goals, Treatment Preferences and Shares the Quality Data Associated with the Providers?: Yes  Discharge Location  Patient Expects to be Discharged to[de-identified] Home with home health        Cee Saini RN  Case Management [phone]

## 2022-03-09 NOTE — PERIOP NOTES
TRANSFER - OUT REPORT:    Verbal report given to Yoana Churchill RN (name) on Mariela Randle  being transferred to 2 Surgical (unit) for routine post - op       Report consisted of patients Situation, Background, Assessment and   Recommendations(SBAR). Information from the following report(s) SBAR, OR Summary, MAR and Cardiac Rhythm sinus rhythm was reviewed with the receiving nurse. Lines:   Peripheral IV 03/09/22 Distal;Posterior;Right Forearm (Active)   Site Assessment Clean, dry, & intact 03/09/22 1254   Phlebitis Assessment 0 03/09/22 1254   Infiltration Assessment 0 03/09/22 1254   Dressing Status Clean, dry, & intact 03/09/22 1254   Dressing Type 4 X 4 03/09/22 1254   Hub Color/Line Status Pink 03/09/22 1254        Opportunity for questions and clarification was provided.       Patient transported with:   [employer]

## 2022-03-09 NOTE — NURSE NAVIGATOR
Rounded on patient s/p left knee revision dos 03/09/2022 with Dr. Александр Nelson. Patient alert and oriented x 3. Pain well controlled at present. Ice pack in place on left knee. Patient denies chest pain, shortness of breath, calf pain or abdominal pain. Patient does endorse nausea. RN on floor notified and Zofran administered. Dressing noted to be clean , dry and intact. Education book provided to patient regarding total knee replacement. Patient encouraged to ambulate early and to utilize incentive spirometer to prevent lung complications. Patient verbalized understanding of all information provided. All questions answered.

## 2022-03-09 NOTE — INTERVAL H&P NOTE
Update History & Physical    The Patient's History and Physical of March 9, 2022 was reviewed with the patient and I examined the patient. There was no change. The surgical site was confirmed by the patient and me. Plan:  The risk, benefits, expected outcome, and alternative to the recommended procedure have been discussed with the patient. Patient understands and wants to proceed with the procedure.     Electronically signed by Cinthya Price MD on 3/9/2022 at 10:01 AM

## 2022-03-10 ENCOUNTER — HOME HEALTH ADMISSION (OUTPATIENT)
Dept: HOME HEALTH SERVICES | Facility: HOME HEALTH | Age: 67
End: 2022-03-10
Payer: MEDICARE

## 2022-03-10 ENCOUNTER — TELEPHONE (OUTPATIENT)
Dept: ORTHOPEDIC SURGERY | Age: 67
End: 2022-03-10

## 2022-03-10 VITALS
SYSTOLIC BLOOD PRESSURE: 136 MMHG | DIASTOLIC BLOOD PRESSURE: 74 MMHG | RESPIRATION RATE: 18 BRPM | BODY MASS INDEX: 32.77 KG/M2 | HEIGHT: 70 IN | WEIGHT: 228.9 LBS | HEART RATE: 84 BPM | OXYGEN SATURATION: 95 % | TEMPERATURE: 98.3 F

## 2022-03-10 LAB
INR PPP: 1.1 (ref 0.8–1.2)
PROTHROMBIN TIME: 14.5 SEC (ref 11.5–15.2)

## 2022-03-10 PROCEDURE — 85610 PROTHROMBIN TIME: CPT

## 2022-03-10 PROCEDURE — 74011000250 HC RX REV CODE- 250: Performed by: ORTHOPAEDIC SURGERY

## 2022-03-10 PROCEDURE — 97116 GAIT TRAINING THERAPY: CPT

## 2022-03-10 PROCEDURE — 74011250636 HC RX REV CODE- 250/636: Performed by: ORTHOPAEDIC SURGERY

## 2022-03-10 PROCEDURE — 74011000258 HC RX REV CODE- 258: Performed by: ORTHOPAEDIC SURGERY

## 2022-03-10 PROCEDURE — 74011250637 HC RX REV CODE- 250/637: Performed by: PHYSICIAN ASSISTANT

## 2022-03-10 PROCEDURE — 97535 SELF CARE MNGMENT TRAINING: CPT

## 2022-03-10 PROCEDURE — 97165 OT EVAL LOW COMPLEX 30 MIN: CPT

## 2022-03-10 PROCEDURE — 77030018842 HC SOL IRR SOD CL 9% BAXT -A

## 2022-03-10 PROCEDURE — 36415 COLL VENOUS BLD VENIPUNCTURE: CPT

## 2022-03-10 PROCEDURE — 97110 THERAPEUTIC EXERCISES: CPT

## 2022-03-10 PROCEDURE — 74011250637 HC RX REV CODE- 250/637: Performed by: ORTHOPAEDIC SURGERY

## 2022-03-10 RX ORDER — WARFARIN 2.5 MG/1
7.5 TABLET ORAL ONCE
Status: COMPLETED | OUTPATIENT
Start: 2022-03-10 | End: 2022-03-10

## 2022-03-10 RX ADMIN — SODIUM CHLORIDE, PRESERVATIVE FREE 10 ML: 5 INJECTION INTRAVENOUS at 05:25

## 2022-03-10 RX ADMIN — CLINDAMYCIN PHOSPHATE 900 MG: 150 INJECTION, SOLUTION INTRAVENOUS at 09:15

## 2022-03-10 RX ADMIN — CLINDAMYCIN PHOSPHATE 900 MG: 150 INJECTION, SOLUTION INTRAVENOUS at 01:19

## 2022-03-10 RX ADMIN — LABETALOL HYDROCHLORIDE 100 MG: 100 TABLET, FILM COATED ORAL at 09:15

## 2022-03-10 RX ADMIN — PREGABALIN 25 MG: 25 CAPSULE ORAL at 09:15

## 2022-03-10 RX ADMIN — FERROUS SULFATE TAB 325 MG (65 MG ELEMENTAL FE) 325 MG: 325 (65 FE) TAB at 09:15

## 2022-03-10 RX ADMIN — PANTOPRAZOLE SODIUM 20 MG: 20 TABLET, DELAYED RELEASE ORAL at 09:15

## 2022-03-10 RX ADMIN — OXYCODONE HYDROCHLORIDE 15 MG: 5 TABLET ORAL at 09:15

## 2022-03-10 RX ADMIN — DOCUSATE SODIUM 50MG AND SENNOSIDES 8.6MG 1 TABLET: 8.6; 5 TABLET, FILM COATED ORAL at 09:15

## 2022-03-10 RX ADMIN — SODIUM CHLORIDE 100 ML/HR: 9 INJECTION, SOLUTION INTRAVENOUS at 05:29

## 2022-03-10 RX ADMIN — WARFARIN SODIUM 7.5 MG: 2.5 TABLET ORAL at 10:36

## 2022-03-10 RX ADMIN — ACETAMINOPHEN 1000 MG: 500 TABLET ORAL at 05:23

## 2022-03-10 RX ADMIN — KETOROLAC TROMETHAMINE 15 MG: 15 INJECTION, SOLUTION INTRAMUSCULAR; INTRAVENOUS at 00:00

## 2022-03-10 RX ADMIN — MONTELUKAST 10 MG: 10 TABLET, FILM COATED ORAL at 09:15

## 2022-03-10 RX ADMIN — LISINOPRIL 20 MG: 20 TABLET ORAL at 09:15

## 2022-03-10 NOTE — NURSE NAVIGATOR
Rounded on patient s/p left knee revision post op day 1, dos 03/09/2022 with Dr. Yemi Chen. Patient alert and oriented x 3. Pain well controlled at present. Patient denies chest pain, shortness of breath, calf pain or abdominal pain. He remains afebrile with vital signs within normal limits. Patient with standby assist was able to ambulate to bedside chair with his rolling walker with steady gate. Call bell placed within reach. Patient is using his incentive spirometer hourly. He is awaiting physical therapy to evaluate him for safe transition to home. Home health has already been arranged. Patient had no questions at this time.

## 2022-03-10 NOTE — PROGRESS NOTES
conducted an initial consultation and Spiritual Assessment for Yolanda Germain, who is a 74 y. o.,male. Patients Primary Language is: [place]. According to the patients EMR Yazidism Affiliation is: [facility].     The reason the Patient came to the hospital is:   Patient Active Problem List    Diagnosis Date Noted    Failed total knee replacement ([facility] [address] 03/09/2022    Malignant neoplasm of prostate ([facility] [address] 11/14/2018    Arthritis of knee 03/27/2018    Primary osteoarthritis of left knee 02/01/2018    Muscle spasm of back 03/07/2017    Warfarin anticoagulation 01/25/2017    Cervical facet joint syndrome 12/06/2016    Muscle spasm 12/06/2016    Myofascial pain 06/20/2016    Abdominal bloating 05/09/2016    Lumbar spinal stenosis 03/22/2016    Bronchitis 03/07/2016    Chronic tension-type headache, not intractable 03/07/2016    Essential hypertension 12/10/2015    Facet arthropathy, lumbar 10/06/2015    DDD (degenerative disc disease), lumbar 10/06/2015    Esophageal reflux     Pure hypercholesterolemia     Personal history of venous thrombosis and embolism         The  provided the following Interventions:  Initiated a relationship of care and support. Explored issues of flex, belief, spirituality and Tenriism/ritual needs while hospitalized. Listened empathically. Provided information about Spiritual Care Services. Offered prayer and assurance of continued prayers on patient's behalf. Chart reviewed. The following outcomes where achieved:  Patient shared limited information about both their medical narrative and spiritual journey/beliefs.  confirmed Patient's Yazidism Affiliation. Patient expressed gratitude for 's visit. Assessment:  Patient does not have any Tenriism/cultural needs that will affect patients preferences in health care. There are no spiritual or Tenriism issues which require intervention at this time.      Plan:  Chaplains will continue to follow and will provide pastoral care on an as needed/requested basis.  recommends bedside caregivers page  on duty if patient shows signs of acute spiritual or emotional distress.     [address]  [phone])

## 2022-03-10 NOTE — PROGRESS NOTES
Problem: Pain  Goal: *Control of Pain  3/10/2022 1240 by Harriett Pizarro RN  Outcome: Resolved/Met  3/10/2022 1031 by Harriett Pizarro RN  Outcome: Progressing Towards Goal  Goal: *PALLIATIVE CARE:  Alleviation of Pain  3/10/2022 1240 by Harriett Pizarro RN  Outcome: Resolved/Met  3/10/2022 1031 by Harriett Pizarro RN  Outcome: Progressing Towards Goal     Problem: Patient Education: Go to Patient Education Activity  Goal: Patient/Family Education  3/10/2022 1240 by Harriett Pizarro RN  Outcome: Resolved/Met  3/10/2022 1031 by Harriett Pizarro RN  Outcome: Progressing Towards Goal     Problem: Infection - Risk of, Surgical Site Infection  Goal: *Absence of surgical site infection signs and symptoms  3/10/2022 1240 by Harriett Pizarro RN  Outcome: Resolved/Met  3/10/2022 1031 by Harriett Pizarro RN  Outcome: Progressing Towards Goal     Problem: Patient Education: Go to Patient Education Activity  Goal: Patient/Family Education  3/10/2022 1240 by Harriett Pizarro RN  Outcome: Resolved/Met  3/10/2022 1031 by Harriett Pizarro RN  Outcome: Progressing Towards Goal     Problem: Falls - Risk of  Goal: *Absence of Falls  Description: Document Elisabeth Muñoz Fall Risk and appropriate interventions in the flowsheet.   3/10/2022 1240 by Harriett Pizarro RN  Outcome: Resolved/Met  3/10/2022 1031 by Harriett Pizarro RN  Outcome: Progressing Towards Goal  Note: Fall Risk Interventions:  Mobility Interventions: Assess mobility with egress test         Medication Interventions: Patient to call before getting OOB    Elimination Interventions: Call light in reach              Problem: Patient Education: Go to Patient Education Activity  Goal: Patient/Family Education  3/10/2022 1240 by Harriett Pizarro RN  Outcome: Resolved/Met  3/10/2022 1031 by Harriett Pizarro RN  Outcome: Progressing Towards Goal     Problem: Patient Education: Go to Patient Education Activity  Goal: Patient/Family Education  3/10/2022 1240 by Jodie Murrieta, RN  Outcome: Resolved/Met  3/10/2022 1031 by Jodie Murrieta, RN  Outcome: Progressing Towards Goal     Problem: Knee Replacement: Day of Surgery/Unit  Goal: Off Pathway (Use only if patient is Off Pathway)  3/10/2022 1240 by Jodie Murrieta, RN  Outcome: Resolved/Met  3/10/2022 1031 by Jodie Murrieta, RN  Outcome: Progressing Towards Goal  Goal: Activity/Safety  3/10/2022 1240 by Jodie Murrieta, RN  Outcome: Resolved/Met  3/10/2022 1031 by Jodie Murrieta, RN  Outcome: Progressing Towards Goal  Goal: Consults, if ordered  3/10/2022 1240 by Jodie Murrieta, RN  Outcome: Resolved/Met  3/10/2022 1031 by Jodie Murrieta, RN  Outcome: Progressing Towards Goal  Goal: Diagnostic Test/Procedures  3/10/2022 1240 by Jodie Murrieta, RN  Outcome: Resolved/Met  3/10/2022 1031 by Jodie Murrieta, RN  Outcome: Progressing Towards Goal  Goal: Nutrition/Diet  3/10/2022 1240 by Jodie Murrieta, RN  Outcome: Resolved/Met  3/10/2022 1031 by Jodie Murrieta, RN  Outcome: Progressing Towards Goal  Goal: Medications  3/10/2022 1240 by Jodie Murrieta, RN  Outcome: Resolved/Met  3/10/2022 1031 by Jodie Murrieta, RN  Outcome: Progressing Towards Goal  Goal: Respiratory  3/10/2022 1240 by Jodie Murrieta, RN  Outcome: Resolved/Met  3/10/2022 1031 by Jodie Murrieta, RN  Outcome: Progressing Towards Goal  Goal: Treatments/Interventions/Procedures  3/10/2022 1240 by Jodie Murrieta, RN  Outcome: Resolved/Met  3/10/2022 1031 by Jodie Murrieta, RN  Outcome: Progressing Towards Goal  Goal: Psychosocial  3/10/2022 1240 by Jodie Murrieta, RN  Outcome: Resolved/Met  3/10/2022 1031 by Jodie Murrieta, RN  Outcome: Progressing Towards Goal  Goal: *Initiate mobility  3/10/2022 1240 by Jodie Murrieta, RN  Outcome: Resolved/Met  3/10/2022 1031 by Jodie Murrieta, RN  Outcome: Progressing Towards Goal  Goal: *Optimal pain control at patient's stated goal  3/10/2022 1240 by Cristin Arango Glen Barber RN  Outcome: Resolved/Met  3/10/2022 1031 by Valery Hopper RN  Outcome: Progressing Towards Goal  Goal: *Hemodynamically stable  3/10/2022 1240 by Valery Hopper RN  Outcome: Resolved/Met  3/10/2022 1031 by Valery Hopper RN  Outcome: Progressing Towards Goal     Problem: Knee Replacement: Post-Op Day 1  Goal: Off Pathway (Use only if patient is Off Pathway)  3/10/2022 1240 by Valery Hopper, RN  Outcome: Resolved/Met  3/10/2022 1031 by Valery Hopper RN  Outcome: Progressing Towards Goal  Goal: Activity/Safety  3/10/2022 1240 by Valery Hopper RN  Outcome: Resolved/Met  3/10/2022 1031 by Valery Hopper RN  Outcome: Progressing Towards Goal  Goal: Diagnostic Test/Procedures  3/10/2022 1240 by Valery Hopper RN  Outcome: Resolved/Met  3/10/2022 1031 by Valery Hopper RN  Outcome: Progressing Towards Goal  Goal: Nutrition/Diet  3/10/2022 1240 by Valery Hopper RN  Outcome: Resolved/Met  3/10/2022 1031 by Valery Hopper RN  Outcome: Progressing Towards Goal  Goal: Medications  3/10/2022 1240 by Valery Hopper, RN  Outcome: Resolved/Met  3/10/2022 1031 by Valery Hopper RN  Outcome: Progressing Towards Goal  Goal: Respiratory  3/10/2022 1240 by Valery Hopper, RN  Outcome: Resolved/Met  3/10/2022 1031 by Valery Hopper, RN  Outcome: Progressing Towards Goal  Goal: Treatments/Interventions/Procedures  3/10/2022 1240 by Valery Hopper RN  Outcome: Resolved/Met  3/10/2022 1031 by Valery Hopper RN  Outcome: Progressing Towards Goal  Goal: Psychosocial  3/10/2022 1240 by aVlery Hopper, RN  Outcome: Resolved/Met  3/10/2022 1031 by Valery Hopper RN  Outcome: Progressing Towards Goal  Goal: Discharge Planning  3/10/2022 1240 by Valery Hopper, RN  Outcome: Resolved/Met  3/10/2022 1031 by Valery Hopper, RN  Outcome: Progressing Towards Goal  Goal: *Demonstrates progressive activity  3/10/2022 1240 by Valery Hopper, RN  Outcome: Resolved/Met  3/10/2022 1031 by Harriett Pizarro RN  Outcome: Progressing Towards Goal  Goal: *Optimal pain control at patient's stated goal  3/10/2022 1240 by Harriett Pizarro RN  Outcome: Resolved/Met  3/10/2022 1031 by Harriett Pizarro RN  Outcome: Progressing Towards Goal  Goal: *Hemodynamically stable  3/10/2022 1240 by Harriett Pizarro RN  Outcome: Resolved/Met  3/10/2022 1031 by Harriett Pizarro RN  Outcome: Progressing Towards Goal  Goal: *Discharge plan identified  3/10/2022 1240 by aHrriett Pizarro RN  Outcome: Resolved/Met  3/10/2022 1031 by Harriett Pizarro RN  Outcome: Progressing Towards Goal     Problem: Knee Replacement: Post-Op Day 2  Goal: Off Pathway (Use only if patient is Off Pathway)  3/10/2022 1240 by Harriett Pizarro RN  Outcome: Resolved/Met  3/10/2022 1031 by Harriett Pizarro RN  Outcome: Progressing Towards Goal  Goal: Activity/Safety  3/10/2022 1240 by Harriett Pizarro RN  Outcome: Resolved/Met  3/10/2022 1031 by Harriett Pizarro RN  Outcome: Progressing Towards Goal  Goal: Diagnostic Test/Procedures  3/10/2022 1240 by Harriett Pizarro RN  Outcome: Resolved/Met  3/10/2022 1031 by Harriett Pizarro RN  Outcome: Progressing Towards Goal  Goal: Medications  3/10/2022 1240 by Harriett Pizarro RN  Outcome: Resolved/Met  3/10/2022 1031 by Harriett Pizarro RN  Outcome: Progressing Towards Goal  Goal: Respiratory  3/10/2022 1240 by Harriett Pizarro RN  Outcome: Resolved/Met  3/10/2022 1031 by Harriett Pizarro RN  Outcome: Progressing Towards Goal  Goal: Treatments/Interventions/Procedures  3/10/2022 1240 by Harriett Piazrro RN  Outcome: Resolved/Met  3/10/2022 1031 by Harriett Pizarro RN  Outcome: Progressing Towards Goal  Goal: Psychosocial  3/10/2022 1240 by Harriett Pizarro RN  Outcome: Resolved/Met  3/10/2022 1031 by Harriett Pizarro RN  Outcome: Progressing Towards Goal  Goal: *Met physical therapy criteria for discharge to the next level of care  3/10/2022 1240 by Jodie Murrieta RN  Outcome: Resolved/Met  3/10/2022 1031 by Jodie Murrieta RN  Outcome: Progressing Towards Goal  Goal: *Optimal pain control with oral analgesia  3/10/2022 1240 by Jodie Murrieta RN  Outcome: Resolved/Met  3/10/2022 1031 by Jodie Murrieta RN  Outcome: Progressing Towards Goal  Goal: *Hemodynamically stable  3/10/2022 1240 by Jodie Murrieta RN  Outcome: Resolved/Met  3/10/2022 1031 by Jodie Murrieta RN  Outcome: Progressing Towards Goal  Goal: *Tolerating diet  3/10/2022 1240 by Jodie Murrieta RN  Outcome: Resolved/Met  3/10/2022 1031 by Jodie Murrieta RN  Outcome: Progressing Towards Goal  Goal: *Patient verbalizes understanding of discharge instructions  3/10/2022 1240 by Jodie Murrieta, MYIRAM  Outcome: Resolved/Met  3/10/2022 1031 by Jodie Murrieta RN  Outcome: Progressing Towards Goal     Problem: Patient Education: Go to Patient Education Activity  Goal: Patient/Family Education  3/10/2022 1240 by Jodie Murrieta RN  Outcome: Resolved/Met  3/10/2022 1031 by Jodie Murrieta RN  Outcome: Progressing Towards Goal     Problem: Patient Education: Go to Patient Education Activity  Goal: Patient/Family Education  3/10/2022 1240 by Jodie Murrieta RN  Outcome: Resolved/Met  3/10/2022 1031 by Jodie Murrieta RN  Outcome: Progressing Towards Goal

## 2022-03-10 NOTE — ROUTINE PROCESS
Received report from [facility]. Pt AAOx3, NAD, breathing non labored, on room air, HOB up. IV site clean, dry and intact. IVF going per order. Ace wrap dressing left knee in place. Family member at the bedside. Bed at the lowest level on lock position, call bell w/i reach. Bedside and Verbal shift change report given to [facility] (oncoming nurse) by me (offgoing nurse). Report included the following information SBAR, Kardex, Procedure Summary, Intake/Output, MAR and Recent Results.

## 2022-03-10 NOTE — PROGRESS NOTES
Pt seen for POD 1 TKA revision on the LLE. Pt is cleared for discharge home from PT stand point when medically appropriate. Pt needs 1 person assist to enter/exit home and use of SPC/crutch as pt does not have handrails on his 3 steps. Recommend HH, full note to follow.  Thank you for this referral. Abdi Limon, PT, DPT

## 2022-03-10 NOTE — PROGRESS NOTES
Problem: Pain  Goal: *Control of Pain  Outcome: Progressing Towards Goal  Goal: *PALLIATIVE CARE:  Alleviation of Pain  Outcome: Progressing Towards Goal     Problem: Patient Education: Go to Patient Education Activity  Goal: Patient/Family Education  Outcome: Progressing Towards Goal     Problem: Infection - Risk of, Surgical Site Infection  Goal: *Absence of surgical site infection signs and symptoms  Outcome: Progressing Towards Goal     Problem: Patient Education: Go to Patient Education Activity  Goal: Patient/Family Education  Outcome: Progressing Towards Goal     Problem: Falls - Risk of  Goal: *Absence of Falls  Description: Document Jj Fall Risk and appropriate interventions in the flowsheet.   Outcome: Progressing Towards Goal  Note: Fall Risk Interventions:  Mobility Interventions: Assess mobility with egress test         Medication Interventions: Patient to call before getting OOB    Elimination Interventions: Call light in reach              Problem: Patient Education: Go to Patient Education Activity  Goal: Patient/Family Education  Outcome: Progressing Towards Goal     Problem: Patient Education: Go to Patient Education Activity  Goal: Patient/Family Education  Outcome: Progressing Towards Goal     Problem: Knee Replacement: Day of Surgery/Unit  Goal: Off Pathway (Use only if patient is Off Pathway)  Outcome: Progressing Towards Goal  Goal: Activity/Safety  Outcome: Progressing Towards Goal  Goal: Consults, if ordered  Outcome: Progressing Towards Goal  Goal: Diagnostic Test/Procedures  Outcome: Progressing Towards Goal  Goal: Nutrition/Diet  Outcome: Progressing Towards Goal  Goal: Medications  Outcome: Progressing Towards Goal  Goal: Respiratory  Outcome: Progressing Towards Goal  Goal: Treatments/Interventions/Procedures  Outcome: Progressing Towards Goal  Goal: Psychosocial  Outcome: Progressing Towards Goal  Goal: *Initiate mobility  Outcome: Progressing Towards Goal  Goal: *Optimal pain control at patient's stated goal  Outcome: Progressing Towards Goal  Goal: *Hemodynamically stable  Outcome: Progressing Towards Goal     Problem: Knee Replacement: Post-Op Day 1  Goal: Off Pathway (Use only if patient is Off Pathway)  Outcome: Progressing Towards Goal  Goal: Activity/Safety  Outcome: Progressing Towards Goal  Goal: Diagnostic Test/Procedures  Outcome: Progressing Towards Goal  Goal: Nutrition/Diet  Outcome: Progressing Towards Goal  Goal: Medications  Outcome: Progressing Towards Goal  Goal: Respiratory  Outcome: Progressing Towards Goal  Goal: Treatments/Interventions/Procedures  Outcome: Progressing Towards Goal  Goal: Psychosocial  Outcome: Progressing Towards Goal  Goal: Discharge Planning  Outcome: Progressing Towards Goal  Goal: *Demonstrates progressive activity  Outcome: Progressing Towards Goal  Goal: *Optimal pain control at patient's stated goal  Outcome: Progressing Towards Goal  Goal: *Hemodynamically stable  Outcome: Progressing Towards Goal  Goal: *Discharge plan identified  Outcome: Progressing Towards Goal     Problem: Knee Replacement: Post-Op Day 2  Goal: Off Pathway (Use only if patient is Off Pathway)  Outcome: Progressing Towards Goal  Goal: Activity/Safety  Outcome: Progressing Towards Goal  Goal: Diagnostic Test/Procedures  Outcome: Progressing Towards Goal  Goal: Medications  Outcome: Progressing Towards Goal  Goal: Respiratory  Outcome: Progressing Towards Goal  Goal: Treatments/Interventions/Procedures  Outcome: Progressing Towards Goal  Goal: Psychosocial  Outcome: Progressing Towards Goal  Goal: *Met physical therapy criteria for discharge to the next level of care  Outcome: Progressing Towards Goal  Goal: *Optimal pain control with oral analgesia  Outcome: Progressing Towards Goal  Goal: *Hemodynamically stable  Outcome: Progressing Towards Goal  Goal: *Tolerating diet  Outcome: Progressing Towards Goal  Goal: *Patient verbalizes understanding of discharge instructions  Outcome: Progressing Towards Goal     Problem: Patient Education: Go to Patient Education Activity  Goal: Patient/Family Education  Outcome: Progressing Towards Goal     Problem: Patient Education: Go to Patient Education Activity  Goal: Patient/Family Education  Outcome: Progressing Towards Goal

## 2022-03-10 NOTE — PROGRESS NOTES
Nurse gave 7.5 mg of coumadin. Pt states he is aware that he should start 10 mg coumadin 03/11/2022. Pt does not have any additional questions.

## 2022-03-10 NOTE — PROGRESS NOTES
Problem: Pain  Goal: *Control of Pain  Outcome: Progressing Towards Goal  Goal: *PALLIATIVE CARE:  Alleviation of Pain  Outcome: Progressing Towards Goal     Problem: Patient Education: Go to Patient Education Activity  Goal: Patient/Family Education  Outcome: Progressing Towards Goal     Problem: Infection - Risk of, Surgical Site Infection  Goal: *Absence of surgical site infection signs and symptoms  Outcome: Progressing Towards Goal     Problem: Patient Education: Go to Patient Education Activity  Goal: Patient/Family Education  Outcome: Progressing Towards Goal     Problem: Falls - Risk of  Goal: *Absence of Falls  Description: Document Jj Fall Risk and appropriate interventions in the flowsheet.   Outcome: Progressing Towards Goal  Note: Fall Risk Interventions:  Mobility Interventions: Assess mobility with egress test,Bed/chair exit alarm,Communicate number of staff needed for ambulation/transfer,Patient to call before getting OOB,PT Consult for mobility concerns,PT Consult for assist device competence,Strengthening exercises (ROM-active/passive),Utilize walker, cane, or other assistive device         Medication Interventions: Assess postural VS orthostatic hypotension,Bed/chair exit alarm,Evaluate medications/consider consulting pharmacy,Patient to call before getting OOB    Elimination Interventions: Call light in reach,Elevated toilet seat,Stay With Me (per policy),Patient to call for help with toileting needs,Toilet paper/wipes in reach,Urinal in reach              Problem: Patient Education: Go to Patient Education Activity  Goal: Patient/Family Education  Outcome: Progressing Towards Goal     Problem: Patient Education: Go to Patient Education Activity  Goal: Patient/Family Education  Outcome: Progressing Towards Goal     Problem: Knee Replacement: Day of Surgery/Unit  Goal: Off Pathway (Use only if patient is Off Pathway)  Outcome: Progressing Towards Goal  Goal: Activity/Safety  Outcome: Progressing Towards Goal  Goal: Consults, if ordered  Outcome: Progressing Towards Goal  Goal: Diagnostic Test/Procedures  Outcome: Progressing Towards Goal  Goal: Nutrition/Diet  Outcome: Progressing Towards Goal  Goal: Medications  Outcome: Progressing Towards Goal  Goal: Respiratory  Outcome: Progressing Towards Goal  Goal: Treatments/Interventions/Procedures  Outcome: Progressing Towards Goal  Goal: Psychosocial  Outcome: Progressing Towards Goal  Goal: *Initiate mobility  Outcome: Progressing Towards Goal  Goal: *Optimal pain control at patient's stated goal  Outcome: Progressing Towards Goal  Goal: *Hemodynamically stable  Outcome: Progressing Towards Goal     Problem: Knee Replacement: Post-Op Day 1  Goal: Off Pathway (Use only if patient is Off Pathway)  Outcome: Progressing Towards Goal  Goal: Activity/Safety  Outcome: Progressing Towards Goal  Goal: Diagnostic Test/Procedures  Outcome: Progressing Towards Goal  Goal: Nutrition/Diet  Outcome: Progressing Towards Goal  Goal: Medications  Outcome: Progressing Towards Goal  Goal: Respiratory  Outcome: Progressing Towards Goal  Goal: Treatments/Interventions/Procedures  Outcome: Progressing Towards Goal  Goal: Psychosocial  Outcome: Progressing Towards Goal  Goal: Discharge Planning  Outcome: Progressing Towards Goal  Goal: *Demonstrates progressive activity  Outcome: Progressing Towards Goal  Goal: *Optimal pain control at patient's stated goal  Outcome: Progressing Towards Goal  Goal: *Hemodynamically stable  Outcome: Progressing Towards Goal  Goal: *Discharge plan identified  Outcome: Progressing Towards Goal     Problem: Knee Replacement: Post-Op Day 2  Goal: Off Pathway (Use only if patient is Off Pathway)  Outcome: Progressing Towards Goal  Goal: Activity/Safety  Outcome: Progressing Towards Goal  Goal: Diagnostic Test/Procedures  Outcome: Progressing Towards Goal  Goal: Medications  Outcome: Progressing Towards Goal  Goal: Respiratory  Outcome: Progressing Towards Goal  Goal: Treatments/Interventions/Procedures  Outcome: Progressing Towards Goal  Goal: Psychosocial  Outcome: Progressing Towards Goal  Goal: *Met physical therapy criteria for discharge to the next level of care  Outcome: Progressing Towards Goal  Goal: *Optimal pain control with oral analgesia  Outcome: Progressing Towards Goal  Goal: *Hemodynamically stable  Outcome: Progressing Towards Goal  Goal: *Tolerating diet  Outcome: Progressing Towards Goal  Goal: *Patient verbalizes understanding of discharge instructions  Outcome: Progressing Towards Goal     Problem: Patient Education: Go to Patient Education Activity  Goal: Patient/Family Education  Outcome: Progressing Towards Goal

## 2022-03-10 NOTE — PROGRESS NOTES
Discharge order noted for today. Pt has been accepted to [facility] agency. Met with patient and he is agreeable to the transition plan today. Transport has been arranged through patients wife. Patient's discharge summary and home health  orders have been forwarded to [address] [place] [address] via [address] Updated bedside RNSeda  to the transition plan. Discharge information has been documented on the AVS.     Patient already has a rolling walker.     TJ Bobby, RN  Care Management  Pager # [phone]

## 2022-03-10 NOTE — PROGRESS NOTES
Ortho    Pt. Seen and evaluated. Doing well, pain well controlled, progressing well with PT  Denies cp, sob, abd pain    Blood pressure 136/74, pulse 84, temperature 98.3 °F (36.8 °C), resp. rate 18, height 5' 10\" (1.778 m), weight 228 lb 14.4 oz (103.8 kg), SpO2 95 %. leftKnee woundclean, dry, no drainage  Sensory intact to LT  Motor intact  nv intact  Neg calf tenderness    Labs:  CBC  @  CBC:   Lab Results   Component Value Date/Time    WBC 6.8 02/28/2022 10:35 AM    RBC 4.29 (L) 02/28/2022 10:35 AM    HGB 13.6 02/28/2022 10:35 AM    HCT 40.4 02/28/2022 10:35 AM    PLATELET 369 87/[birth year]/1979 10:35 AM     BMP:   Lab Results   Component Value Date/Time    Glucose 109 (H) 02/28/2022 10:35 AM    Sodium 137 02/28/2022 10:35 AM    Potassium 4.0 02/28/2022 10:35 AM    Chloride 102 02/28/2022 10:35 AM    CO2 29 02/28/2022 10:35 AM    BUN 11 02/28/2022 10:35 AM    Creatinine 0.84 02/28/2022 10:35 AM    Calcium 9.0 02/28/2022 10:35 AM   @  Coagulation  Lab Results   Component Value Date    INR 1.1 03/10/2022    APTT 28.8 03/09/2022      Basic Metabolic Profile  Lab Results   Component Value Date     02/28/2022    CO2 29 02/28/2022    BUN 11 02/28/2022       Assesment: leftOrthopedic / Rheumatologic: Total Knee Replacement  Past Medical History:   Diagnosis Date    Arthritis     Chronic pain     knee and shoulder    DVT (deep venous thrombosis) ([facility] 1992    post sx. R LEG    DVT (deep venous thrombosis) ([facility]) 2000    POST BACK SX. 2- LEFT LEG    GERD (gastroesophageal reflux disease)     Headache(784.0)     everyday    High cholesterol     Hypertension     Prostate cancer ([facility] 2018    Pure hypercholesterolemia     Spinal stenosis     Thromboembolus ([facility]        Plan: coumadin, PT, DC to home if cleared by PT and ok with medicine.

## 2022-03-10 NOTE — PROGRESS NOTES
Problem: Mobility Impaired (Adult and Pediatric)  Goal: *Acute Goals and Plan of Care (Insert Text)  Description: Physical Therapy Goals  Initiated 3/9/2022 and to be accomplished within 7 day(s)  1. Patient will move from supine to sit and sit to supine , scoot up and down, and roll side to side in bed with modified independence. 2.  Patient will transfer from bed to chair and chair to bed with modified independence using the least restrictive device. 3.  Patient will perform sit to stand with modified independence. 4.  Patient will ambulate with modified independence for 150 feet with the least restrictive device. 5.  Patient will ascend/descend 3 stairs with 0 handrail(s) with minimal assistance/contact guard assist.     PLOF: Pt reporting he lives with wife in 1 story house with 3 TE with NO HANDRAILS. Pt using cane at home PTA. Outcome: Progressing Towards Goal   PHYSICAL THERAPY TREATMENT    Patient: Raquel Glass (59 y.o. male)  Date: 3/10/2022  Diagnosis: Failed total knee replacement [facility] [T84.018A, Z96.659] <principal problem not specified>  Procedure(s) (LRB):  LEFT KNEE REVISION/DAVID/GUEVARA TO ASSIST/NERVE BLOCK (Left) 1 Day Post-Op  Precautions: Fall,WBAT (LLE)  PLOF: see above     ASSESSMENT:  Pt seen in recliner in NAD, POD 1 for L TKA revision. Pt reporting 6/10 pain and just received pain meds. Pt stands with supervision followed by ambulation with the RW for approx 150 ft with antalgic step to gait pattern, pt given cues to increase knee flexion during swing phase on the L as well as to facilitate step through with the RLE during L stance phase. Pt went up/down 4 steps with R HR only and close CGA, verbal cues for sequencing. Pt has no HR at home and thus recommend SPC/crutch and family support to navigate steps at this time. Pt returns to room and is given HEP per total knee handbook to increase ROM. Pt educated on ice use, ambulation and HEP frequency, verbalized understanding. Pt left with LE elevated, towel roll under ankle and ice pack donned. At this time, pt is cleared for discharge home from PT stand point in the acute setting. Progression toward goals:   [x]      Improving appropriately and progressing toward goals  []      Improving slowly and progressing toward goals  []      Not making progress toward goals and plan of care will be adjusted     PLAN:  Patient continues to benefit from skilled intervention to address the above impairments. Continue treatment per established plan of care. Discharge Recommendations:  Home Health with family support and assist on stairs to enter/exit home  Further Equipment Recommendations for Discharge:  N/A- pt owns RW     SUBJECTIVE:   Patient stated I think it hurts the same this time.     OBJECTIVE DATA SUMMARY:   Critical Behavior:  Neurologic State: Alert  Orientation Level: Oriented X4  Cognition: Follows commands  Safety/Judgement: Fall prevention,Awareness of environment  Functional Mobility Training:  Bed Mobility:     Supine to Sit:  (NT pt up in chair during session )     Scooting: Stand-by assistance         Transfers:  Sit to Stand: Supervision  Stand to Sit: Supervision       Balance:  Sitting: Intact  Standing: Impaired; With support  Standing - Static: Good  Standing - Dynamic : Fair   Range Of Motion:   AROM: Generally decreased, functional (pt with PMHx B shoulder sx)         Ambulation/Gait Training:  Distance (ft): 150 Feet (ft)  Assistive Device: Walker, rolling  Ambulation - Level of Assistance: Supervision        Gait Abnormalities: Antalgic;Decreased step clearance; Step to gait     Left Side Weight Bearing: As tolerated  Base of Support: Center of gravity altered;Shift to right     Speed/Veronica: Slow  Step Length: Left lengthened;Right shortened  Swing Pattern: Left asymmetrical       Stairs:  Number of Stairs Trained: 4  Stairs - Level of Assistance: Contact guard assistance  Rail Use: Right     Therapeutic Exercises:   Per total knee handbook, emphasis on ankle pumps, heel slides, quad sets x10/hr while awake, pt demonstrated understanding    Pain:  Pain level pre-treatment: 6/10  Pain level post-treatment: 6/10   Pain Intervention(s): Medication (see MAR); Rest, Ice, Repositioning   Response to intervention: Nurse notified    Activity Tolerance:   Good    Please refer to the flowsheet for vital signs taken during this treatment. After treatment:   [x] Patient left in no apparent distress sitting up in chair  [] Patient left in no apparent distress in bed  [x] Call bell left within reach  [x] Nursing notified  [] Caregiver present  [] Bed alarm activated  [] SCDs applied      COMMUNICATION/EDUCATION:   [x]         Role of Physical Therapy in the acute care setting. [x]         Fall prevention education was provided and the patient/caregiver indicated understanding. [x]         Patient/family have participated as able in working toward goals and plan of care. [x]         Patient/family agree to work toward stated goals and plan of care. []         Patient understands intent and goals of therapy, but is neutral about his/her participation.   []         Patient is unable to participate in stated goals/plan of care: ongoing with therapy staff.  []         Other:        Stefanie Wall   Time Calculation: 38 mins

## 2022-03-10 NOTE — PROGRESS NOTES
Problem: Self Care Deficits Care Plan (Adult)  Goal: *Acute Goals and Plan of Care (Insert Text)  Outcome: Resolved/Met       OCCUPATIONAL THERAPY EVALUATION/DISCHARGE    Patient: Chrystal Burkitt [de-identified]74 y.o. male)  Date: 3/10/2022  Primary Diagnosis: Failed total knee replacement ([facility] [T84.018A, Z96.659]  Procedure(s) (LRB):  LEFT KNEE REVISION/DAVID/GUEVARA TO ASSIST/NERVE BLOCK (Left) 1 Day Post-Op   Precautions:  Fall,WBAT (LLE)  PLOF: Patient was independent with self-care and functional mobility PTA. ASSESSMENT AND RECOMMENDATIONS:  Patient cleared to participate in OT evaluation by RN. Upon entering the room, patient was seated in chair, alert, and agreeable to participate in OT evaluation. Patient educated on weight-bearing status, importance of ice, and safety during this admission/around the house. Patient is independent - supervision with basic self-care and supervision with functional transfers using rolling walker in preparation for ADLs. Patient with pain increase this session; RN notified. Patient educated on energy conservation techniques, pursed lip breathing, and self pacing during daily activities to assist with pain management. Patient educated on adaptive equipment for lower body dressing and how to don/doff socks, pants, and underwear. Patient practiced lower body dressing with use of AE given (reacher, sock aid) after demonstration. No assist needed after practice. Patient also given a long handled sponge and long handled shoe horn after demonstration. Based on the objective data described below, the patient presents with no deficits that impede pt function with ADLs, functional transfers, and functional mobility in preparation for selfcare tasks. At this time patient is safe to d/c home with family support from selfcare standpoint. OT to d/c from caseload. Skilled occupational therapy is not indicated at this time.   Discharge Recommendations: Home Health  Further Equipment Recommendations for Discharge: Patient has all DME      SUBJECTIVE:   Patient stated i've put this off way too long and \"thank you so much for showing me this stuff\" - AE    OBJECTIVE DATA SUMMARY:     Past Medical History:   Diagnosis Date    Arthritis     Chronic pain     knee and shoulder    DVT (deep venous thrombosis) ([facility] 1992    post sx. R LEG    DVT (deep venous thrombosis) ([facility] 2000    POST BACK SX. 2- LEFT LEG    GERD (gastroesophageal reflux disease)     Headache(784.0)     everyday    High cholesterol     Hypertension     Prostate cancer ([facility] 2018    Pure hypercholesterolemia     Spinal stenosis     Thromboembolus ([facility]      Past Surgical History:   Procedure Laterality Date    HX HEENT Right 09/11/2018    eye surgery, macular     HX KNEE REPLACEMENT Left 2018    HX LUMBAR LAMINECTOMY  1992    HX LUMBAR LAMINECTOMY  2000    HX ORTHOPAEDIC  06-25-12    Right foot with excision of bursa and adipose tissue from fifth metatarsal base by Dr. Caralee Spurling PROSTATECTOMY  11/2018    HX Branda Peeling Right 01/28/2019    by Dr. Medina Shields SHOULDER ARTHROSCOPY Left 12/2020    WORK RELATED INJURY     Barriers to Learning/Limitations: None  Compensate with: visual, verbal, tactile, kinesthetic cues/model    Home Situation:   Home Situation  Home Environment: Private residence  # Steps to Enter: 3  Rails to Enter: No  Wheelchair Ramp: No  One/Two Story Residence: One story  Living Alone: No  Support Systems: Spouse/Significant Other  Patient Expects to be Discharged to[de-identified] Home  Current DME Used/Available at Home: [address], straight,Walker, rolling,Shower chair  Tub or Shower Type: Tub/Shower combination  [x]     Right hand dominant   []     Left hand dominant    Cognitive/Behavioral Status:  Neurologic State: Alert  Orientation Level: Oriented X4  Cognition: Follows commands  Safety/Judgement: Fall prevention; Awareness of environment    Skin: Intact  Edema: None noted    Vision/Perceptual:    Acuity: Within Defined Limits      Coordination: BUE  Fine Motor Skills-Upper: Left Intact; Right Intact    Gross Motor Skills-Upper: Left Intact; Right Intact    Balance:  Sitting: Intact  Standing: Impaired; With support  Standing - Static: Good  Standing - Dynamic : Fair    Strength: BUE  Strength: Generally decreased, functional        Tone & Sensation: BUE  Tone: Normal  Sensation: Intact     Range of Motion: BUE  AROM: Generally decreased, functional (pt with PMHx B shoulder sx)      Functional Mobility and Transfers for ADLs:  Bed Mobility:  Scooting: Stand-by assistance    Transfers:  Sit to Stand: Supervision  Stand to Sit: Supervision   Toilet Transfer : Supervision (simulation with recliner)     ADL Assessment:  Feeding: Independent    Oral Facial Hygiene/Grooming: Independent    Bathing: Modified independent;Supervision (Supervision standing)    Upper Body Dressing: Modified independent    Lower Body Dressing: Modified independent;Supervision (Supervision standing)    Toileting: Modified independent      ADL Intervention:  Grooming  Grooming Assistance: Independent  Position Performed: Standing (at sink)  Washing Face: Independent  Washing Hands: Independent  Brushing Teeth: Independent    Upper Body Dressing Assistance  Dressing Assistance: Modified independent  Hospital Gown: Modified independent    Lower Body Dressing Assistance  Dressing Assistance: Modified independent;Supervision  Underpants: Supervision; Compensatory technique training (simulation standing; threading LLE first)  Socks: Modified independent  Leg Crossed Method Used: No  Position Performed: Seated in chair;Bending forward method (unable to perform bending; AE issued)  Adaptive Equipment Used: Reacher;Sock aid    Toileting  Toileting Assistance: Modified independent  Bladder Hygiene: Modified independent (standing using urinal in restroom)    Cognitive Retraining  Safety/Judgement: Fall prevention; Awareness of environment    Pain:  Pain level pre-treatment: 4-6/10 , LLE  Pain level post-treatment: 7/10 , LLE  Pain Intervention(s): Medication (see MAR); Rest, Ice, Repositioning   Response to intervention: Nurse notified, See doc flow    Activity Tolerance:   Fair+      Please refer to the flowsheet for vital signs taken during this treatment. After treatment:   [x]  Patient left in no apparent distress sitting up in chair  []  Patient left in no apparent distress in bed  [x]  Call bell left within reach  [x]  Nursing notified  []  Caregiver present  []  Bed alarm activated    COMMUNICATION/EDUCATION:   [x]      Role of Occupational Therapy in the acute care setting  [x]      Home safety education was provided and the patient/caregiver indicated understanding. [x]      Patient/family have participated as able and agree with findings and recommendations. []      Patient is unable to participate in plan of care at this time. Thank you for this referral.  Jason Pillai OTR/L  Time Calculation: 31 mins      Eval Complexity: History: LOW Complexity : Brief history review ; Examination: LOW Complexity : 1-3 performance deficits relating to physical, cognitive , or psychosocial skils that result in activity limitations and / or participation restrictions ;    Decision Making:LOW Complexity : No comorbidities that affect functional and no verbal or physical assistance needed to complete eval tasks

## 2022-03-10 NOTE — OP NOTES
[address] [place] [address]   OPERATIVE REPORT    Name:  Janet Mauro  MR#:   [id number]  :  1955  ACCOUNT #:  [de-identified]  DATE OF SERVICE:  2022    PREOPERATIVE DIAGNOSIS:  Failed left femoral component/knee replacement. POSTOPERATIVE DIAGNOSIS:  Failed left femoral component/knee replacement. PROCEDURES PERFORMED:  1. Revision left total knee replacement using the [employer] revision system with a size 5 left total stabilizer femoral component with size 5, 10 mm distal augments; size 5, 10 mm posterior augments, with a 15 x 50 stem and also a size 9 tibial bearing insert, posterior stabilized. 2.  Lateral release with closure. 3.  Removal of scar tissue. SURGEON:  [address] [place] [address]. Zander Gupta MD.    FIRST ASSISTANT:  Neil Dominguez. SECOND ASSISTANT:  Luisa Arora. ANESTHESIOLOGIST:  Dr. Mary Hall. ANESTHESIA:  Preoperative femoral nerve block with light general.    COMPLICATIONS:  None. SPECIMENS REMOVED:  None. IMPLANTS:  As above mentioned. ESTIMATED BLOOD LOSS:  Less than 50. INDICATIONS FOR PROCEDURE:  The patient has been well worked up for infection including blood work, aspirations, and bone scan confirming loosening on the femoral side, but not the tibial side. He has a positive history for start up discomfort. We followed him with serial x-rays, and although the x-rays had only modest changes, we confirmed the diagnosis. All questions were answered. We did discuss complications including chronic pain and other complications with this. DESCRIPTION OF PROCEDURE:  The patient was brought to the operating room. Standard prep and drape. The previous incision marked, prepped and draped, and time-out performed. Antibiotics confirmed given. We used the previous incision, extended it both proximally and distally. The skin was very thin. We had to be very careful with the dissection, maintaining full-thickness flaps as much as possible. Standard arthrotomy.   Then, we did do a partial lateral release distally and freed up some scar tissue. We debrided the patella, which was in good condition and stable. Tibia was stable and well fixed. I did assess his femoral rotation prior to removing the bearing. I took the bearing out. The femoral component was loose, especially on the lateral side. On the medial side, the cement was a little bit better. We were able to, with a combination of saw and osteotome, extract the femoral component relatively cleanly. We removed all the fibrous tissue. We then used the gap  and the cutting femoral component, placed the bearing, set our rotation, pinned the block, and then did freshen up clean-up cuts. We did some adjustments with the wedges to obtain excellent balance between medial and lateral flexion, extension, and rotation as well. We trialed this and I was very pleased with it. We then prepared the canal.  We did ream the canal distally and prepared it for the femoral revision component. Cutter box as well. We then reduced the knee. I was very pleased with the trial components. Good extension, good flexion, good balance, good rotation. Lavaged out, cemented in, removed all extraneous cement, and held the knee in full extension. The cement was fully cured. Retrialed and I was happiest with the 9, again reducing the knee. Let the tourniquet down. Exparel cocktail. Further controlled hemostasis. It should be noted that during the procedure, the neurovascular structures were protected at all times. I was delighted with the result. The patient was brought to the recovery room in stable condition without complication.       Giuliana Brandt MD [id number]  D:  03/09/2022 12:52  T:  03/09/2022 21:08  JOB #:  [id number]

## 2022-03-10 NOTE — HOME CARE
Received home health referral for [facility] for (SN, PT per protocol). Spoke with patient in room;  patient identifiers verified. Explained home care services and routines. Demographics verified including insurance, phone and address confirmed. Patient has the following DME: already has at home for use. RW and cane. Caregivers available spouse as primary caregiver. Orders noted and arranged to be processed to central intake.      ---   Zarina Dickinson LPN  [facility] - INPATIENT Liaison

## 2022-03-11 ENCOUNTER — HOME CARE VISIT (OUTPATIENT)
Dept: SCHEDULING | Facility: HOME HEALTH | Age: 67
End: 2022-03-11
Payer: MEDICARE

## 2022-03-11 ENCOUNTER — TELEPHONE (OUTPATIENT)
Dept: ORTHOPEDIC SURGERY | Age: 67
End: 2022-03-11

## 2022-03-11 ENCOUNTER — HOME CARE VISIT (OUTPATIENT)
Dept: HOME HEALTH SERVICES | Facility: HOME HEALTH | Age: 67
End: 2022-03-11
Payer: MEDICARE

## 2022-03-11 VITALS
RESPIRATION RATE: 17 BRPM | HEART RATE: 88 BPM | DIASTOLIC BLOOD PRESSURE: 65 MMHG | OXYGEN SATURATION: 95 % | SYSTOLIC BLOOD PRESSURE: 130 MMHG | TEMPERATURE: 97.9 F

## 2022-03-11 PROCEDURE — G0299 HHS/HOSPICE OF RN EA 15 MIN: HCPCS

## 2022-03-11 PROCEDURE — 3331090002 HH PPS REVENUE DEBIT

## 2022-03-11 PROCEDURE — 3331090001 HH PPS REVENUE CREDIT

## 2022-03-11 PROCEDURE — 400013 HH SOC

## 2022-03-11 PROCEDURE — 400018 HH-NO PAY CLAIM PROCEDURE

## 2022-03-11 PROCEDURE — A6213 FOAM DRG >16<=48 SQ IN W/BDR: HCPCS

## 2022-03-11 PROCEDURE — G0151 HHCP-SERV OF PT,EA 15 MIN: HCPCS

## 2022-03-11 NOTE — NURSE NAVIGATOR
03/11/2022@ 1007  Post op call . Call placed to patient, ID verified x 2. Spoke with both wife and  via speaker phone as both wanted to contribute to the conversation. Per , ok to discuss with wife. Wife states that her  fell twice yesterday due to overall weakness. Patient states that they called the on call doctor an he advised them to inform office if he is continuing to have weakness. Patient states he thinks the narcotic pain medication was too strong. He has since stopped this and Is taking extra strength tylenol only. Patient educated that he may take 2 every 8 hours and not to exceed that dosing. He is using ice to assist with pain. Patient's wife states that he is not mobile as she is fearful that he will fall and her sons are not there to assist getting him up. Patient reports that physical therapy and home health are coming to see him early this afternoon. Education provided regarding need to ensure that he is doing his home exercises and ankle pumps to prevent DVT. Patient instructed to call office should weakness continue, but to allow PT to evaluate him for safe ambulation. Wife states that patient is feeling better now that he is not taking anymore of the narcotics. Patient denies any increase in pain in either hip or legs, feels he did not injure anything with his falls. Patient verbalized understanding of information provided. All questions answered.

## 2022-03-11 NOTE — TELEPHONE ENCOUNTER
Giovanni Ndiaye from [address] [place] [address] called stating there is a prescription for Warfarin 5 mg take 2.5 tablets daily, and another one for Warfarin 3 take 1 tablet daily. Gloria needed clarification for the correct prescription. Gloria can be reached at [phone], or the patient can be reached at  [phone] or [phone].

## 2022-03-11 NOTE — TELEPHONE ENCOUNTER
Postop Problem    Returned patients call and spoke with his wife due to concerns of patient falling twice since being discharged home today. He is POD1 Revision Left TKA. He does not have any knee pain nor does he have concerns of injuring his knee during falls. He and his wife report increase in weakness since coming home. Both falls involved him landing on his bottom and not on the knee. They also deny any twisting of the kn ee. They report the dressing is intact & not draining. Patient denies Chest pain, shortness of breath or any Ill feeling. The patients son is also there and able to assist.  They are having trouble getting him up this time but think he can get on the couch and spend the night there. His wife also thinks he could just be fatigued since he didnt sleep much last night. I encouraged patient to drink plenty of fluids, ice the kne e, and take pain medicine before bed. I assured him that he would probably feel better in the morning. If he is still feeling weak in AM, I instructed them to call the office.

## 2022-03-11 NOTE — TELEPHONE ENCOUNTER
Contacted Parth Patrick with [facility]. Informed her of recommendations for Coumadin. States that she will contact pt and inform him of the information.

## 2022-03-12 ENCOUNTER — HOME CARE VISIT (OUTPATIENT)
Dept: SCHEDULING | Facility: HOME HEALTH | Age: 67
End: 2022-03-12
Payer: MEDICARE

## 2022-03-12 VITALS
TEMPERATURE: 98.6 F | SYSTOLIC BLOOD PRESSURE: 132 MMHG | OXYGEN SATURATION: 95 % | RESPIRATION RATE: 14 BRPM | HEART RATE: 87 BPM | DIASTOLIC BLOOD PRESSURE: 75 MMHG

## 2022-03-12 PROCEDURE — 3331090002 HH PPS REVENUE DEBIT

## 2022-03-12 PROCEDURE — G0157 HHC PT ASSISTANT EA 15: HCPCS

## 2022-03-12 PROCEDURE — 3331090001 HH PPS REVENUE CREDIT

## 2022-03-12 NOTE — HOME HEALTH
Skilled Reason for admission/summary of clinical condition:  Kenzie Hancock  . HHPT medically necessary to address  dx and clinical findings :decreased LE strength, impaired gait, no HEP, stairs, LE swelling, bed mobility, dep in transfers, decreased endurance, decreased balance and decreased safety in order to improve functional mobility, quality of life and decrease burden of care. patient stated that he had a fall last night landed on his bottom, called ortho after hours answering services   reported that he felt weak and landed on his bottom, L knee was not twisted did not flex, reports that fall happened    List of Comorbitites:  Arthritis  Chronic pain knee and shoulder  DVT (deep venous thrombosis) ([facility] [address] 1992 post s x. R LEG  DVT (deep venous thrombosis) ([facility] [address] 2000 POST BACK SX. 2- LEFT LEG  GERD (gastroesophageal reflux disease)  Headache(784.0) everyday  High cholesterol  Hypertension  Prostate cancer ([facility] [address]  Pure hypercholesterolemia  Spinal stenosis  Thromboembolus ([facility] [address]   Caregiver involvement: lives with spouse , who assists with meds, meals, functional mobility, transport to MD appts. Lives in one story house with 3 stairs to enter with handrails   Medications reconciled and all medications are available in the home this visit. The following education was provided regarding high medications: warfarin risk of bleeding and oxycodone to take medications as prescribed  Medications  are effective at this time. Md contacted for clarification regarding warfarin.  warfarin 5mg and warfarin 3mg in the home, Md advised that patient resume prior dosing  Called Ortho office for clarification on Warfarin:  patient to take prior dosing of 10mg per day   PLOF: amb with cane  Indep with ADLs,   ROM: at initial assessment:  L knee flexion 56 degrees,  L knee extension -30degrees   Strength: at initial assessment:   L hip flexion 3-/5, L knee flexion 2/5,  L knee extension 2/5,    Bed mobility: N/A pt sleeps in recliner  > 8month  Transfers: pt transferred sit to stand with cga. VC for scooting to edge of seat and placement of BUE in order for ease with anterior weight shift. Gait training: PT instructed pt in amb 25ft with RW cga. Gait deficits noted: slow yesenia, decreased L stance vc for increasing L heel strike and increase L knee flextion for increased L foot clearance   Patient education provided this visit:  BLE strengthening HEP, transfer/gait training, fall prevention  Patient/caregiver degree of understanding: good, able to verbalize with vc, will benefit reinforcement  Home exercise program/Homework provided: 10 reps each BLE 2- 3x/daily    Supine: AP, QS, GS , heel slides   Seated: HR/TR, LAQ, hip flexion, hip add   Pt/Caregiver instructed on plan of care and are agreeable to plan of care at this time. Patient's response to treatment: able to participate with therapy required seated rest break d/t fatigue and increased pain   Plan of care and admission to home health status called to attending physician: Sudhir Escalante MD  Discharge planning discussed with patient and caregiver. Discharge planning as follows: DC HHPT to self/caregiver under supervision of MD  when goals are met or max benefits achieved. Pt/Caregiver did verbalize understanding of discharge planning. Next MD appointment 24 March  (date) with Ortho MD/NP/PA. Patient/caregiver encouraged/instructed to keep appointment as lack of follow through with physician appointment could result in discontinuation of home care services for non-compliance.   per nursing collaboration on 3/15: L knee incision clean intact and wound edges well approximated no redness no drainage noted

## 2022-03-13 ENCOUNTER — HOME CARE VISIT (OUTPATIENT)
Dept: SCHEDULING | Facility: HOME HEALTH | Age: 67
End: 2022-03-13
Payer: MEDICARE

## 2022-03-13 VITALS
SYSTOLIC BLOOD PRESSURE: 160 MMHG | RESPIRATION RATE: 18 BRPM | OXYGEN SATURATION: 96 % | TEMPERATURE: 97.6 F | HEART RATE: 72 BPM | DIASTOLIC BLOOD PRESSURE: 82 MMHG

## 2022-03-13 PROCEDURE — G0157 HHC PT ASSISTANT EA 15: HCPCS

## 2022-03-13 PROCEDURE — 3331090002 HH PPS REVENUE DEBIT

## 2022-03-13 PROCEDURE — 3331090001 HH PPS REVENUE CREDIT

## 2022-03-13 NOTE — HOME HEALTH
Summary of clinical health condition: 74 y.o. whom had a previous TKA of their Left knee and has developed loosening of the femoral component. He has been worked up for infection with a aspiration and labs which were normal.  He has start up pain. Due to the current findings and affected activities of daily living, surgical intervention is indicated. S/P LT Knee Revision done 3/9 by Dr. Neto Quintero. Medications reviewed all medications are at home. The following education was provided regarding medications, medication interactions, and look a like medications: N/A all meds reviewed. Discussed importance of compliance, timely taking all prescribed meds, proper dosage and freq. Teaching education provided S/E with Oxycodone; constipation, drowsiness. Recommend taking Oxycodone for pain on reg basis to achieve good pain relief. To call MD with excruciating pain and uncontrollable pain. Discussed S/E with Warfarin; bleeding gums, nose, black and tarry stool, coughing with coffee grounds and discoloration with urine. SN called Cat Spears NP f/u when next PT/INR, replied 3/18/22 and will be check at the office. Pt is aware. Medications are somewhat effective at this time. Caregiver: caregiver/wife is available to assist with daily meals, ADL's prn, assist with daily medications, run errands, groceries and accompany to MD appt. Skilled care provided: Teaching disease and medication management, Completed assessment, L knee surgical wound dressing (Aquacel) sat 15 % drainage (old drainage) min swelling & no redness. Dressing due to be changed 3/16/22. Completed initial eval, discussed POC, SNV freq and D/C. Patient education provided this visit to include: Hand washing, wearing mask during clinician visit and going out to public, avoiding sick person. Continue cold compress 15-20 q 1 hr,  Elevate RLE , IS, monitor for increased wound drainage & to call HHA with drainage 65%>.  Ambulate as tolerated q2 hrs, safety and fall prec; clearing walkway, placing walker  in easy access, avoid getting up too quickly when feeling dizzy, weak and to call for assistance prn. Energy conservation, rest in bet activity and deep breathing exercises. Continue  heart healthy diet, increasing protein intake, avoid skipping meals, sm freq meals with less appetite, and good hydration. Reviewed S/S of infection; fever 100.4, increase pain, redness, swelling, wound dehisced, coughing with yellow thick sputum, cloudy urine with foul smell, not feeling well 2-3 days, SOB, and to call HCFA or MD for assistance if experiencing any of these S/S. To call 911 with chest pains, facial drooping, difficulty talking, non arousable/unconscious and uncontrollable bleeding. Patient/caregiver degree of understanding: Partially met. Home health supplies by type and quantity ordered/delivered this visit include: Opsite ordered by admitting clinician. Pt informed SN [facility] 1w3, 2 prn and next [facility] 3/16 & 3/22. Home exercise program/Homework provided: Ambulation, HEP deep breathing exercises 10x when having SOB, pain and anxiety, IS 10x q 1-2 hrs. Discharge planning discussed with patient and caregiver. Discharge planning as follows: Pt/CG will be able to manage disease and medication independently, L knee surgical wound is healed and health condition stable. Pt/Caregiver did verbalize understanding of discharge planning. Patient/caregiver encouraged/instructed to keep appointment as lack of follow through with physician appointment could result in discontinuation of home care services for non-compliance. COVID - 23 Screening completed before visit:       Denies and no family member  has any of these S/S:    Fever, dry cough, sore throat diarrhea, chills, body aches, not feeling well and loss of taste.

## 2022-03-14 ENCOUNTER — TELEPHONE (OUTPATIENT)
Dept: ORTHOPEDIC SURGERY | Age: 67
End: 2022-03-14

## 2022-03-14 ENCOUNTER — HOME CARE VISIT (OUTPATIENT)
Dept: SCHEDULING | Facility: HOME HEALTH | Age: 67
End: 2022-03-14
Payer: MEDICARE

## 2022-03-14 VITALS
RESPIRATION RATE: 14 BRPM | SYSTOLIC BLOOD PRESSURE: 124 MMHG | TEMPERATURE: 98.4 F | OXYGEN SATURATION: 97 % | DIASTOLIC BLOOD PRESSURE: 74 MMHG | HEART RATE: 79 BPM

## 2022-03-14 VITALS
TEMPERATURE: 97.2 F | DIASTOLIC BLOOD PRESSURE: 80 MMHG | RESPIRATION RATE: 17 BRPM | OXYGEN SATURATION: 93 % | SYSTOLIC BLOOD PRESSURE: 140 MMHG | HEART RATE: 83 BPM

## 2022-03-14 PROCEDURE — G0151 HHCP-SERV OF PT,EA 15 MIN: HCPCS

## 2022-03-14 PROCEDURE — 3331090002 HH PPS REVENUE DEBIT

## 2022-03-14 PROCEDURE — 3331090001 HH PPS REVENUE CREDIT

## 2022-03-14 NOTE — HOME HEALTH
Subjective: Patient relays that he has had no more falls since last visit nor feeling like he was going to lose his balance. He states that he has been attempting to complete his given HEP as instructed. Objective: Skilled home health physical therapy interventions completed today listed in care plan section. Interventions performed today to assist in return to prior level of function, address deficits as apparent upon time of initial evaluation, return to community and personal activities, and progress further towards goals as previously established in plan of care. There are not any changes to medications upon timing of this visit. Home health supplies were not ordered/delivered today. Visual inspection finds dressing intact with no marked drainage level into dressing today. Trained in appropriate elevation of lower extremity to assist in management of pain and lower extremity edema. Assessment:  Patient is progressing towards goals as previously established in POC with skilled home health physical therapy services at this time as made apparent by improving knee flexion active range of motion and ability to display improved stride length after training for corrections. Family/caregiver spouse involvement is present/set-up at this point in time and assists with meals, transport, getting ice, ADLs as necessary, and general encouragement. Patient able to display proper elevation of lower extremity after training for form/placement. Plan:  Discharge planning discussed at this visit regarding eventual discharge once patient has met goals and/or met maximum benefit from home health skilled PT services with patient understanding and agreeable at this time. Continued need for skilled home health PT at this time to address deficits, reduce risk of falls, and obtain goals as previously established per plan of care. Further gait distance and attempt standing exercises if warranted and tolerated.

## 2022-03-14 NOTE — PROGRESS NOTES
Physician Progress Note      PATIENT:               Judi Krishnan  CSN #:                  [id number]  :                       1955  ADMIT DATE:       3/9/2022 8:18 AM  DISCH DATE:        3/10/2022 12:44 PM  RESPONDING  PROVIDER #:        Terrence Miramontes PA-C          QUERY TEXT:    Dear  Umang Owen  or  Dr Korina Holliday  Pt admitted and  s/p left knee revision. and has DVT documented  in [facility] . If possible, please document in progress notes and discharge summary further specificity regarding the DVT to include POA status, laterality, acuity/chronicity and vessels affected/involved: The medical record reflects the following:  Risk Factors:   2  previous  episodes  DVT  in   and  . Clinical Indicators: DVT (deep venous thrombosis) ([address]   ? POST BACK SX. 2- LEFT LEG  2019 Duplex LLE:  Chronic non-occlusive thrombus present in the left small saphenous vein. , Left small saphenous vein displays venous insufficiency. Treatment: - pt  still taking  Coumadin   5mg  po as ordered   at home   since      Thank  you,   Diane Franklin RN   CCDS  [email or web address]  Options provided:  -- Chronic left lower  extremity  DVT present on admission  -- PMH DVT only  -- Other - I will add my own diagnosis  -- Disagree - Not applicable / Not valid  -- Disagree - Clinically unable to determine / Unknown  -- Refer to Clinical Documentation Reviewer    PROVIDER RESPONSE TEXT:    Chronic left lower extremity DVT was present on admission.     Query created by: Bianca Riley on 3/9/2022 3:14 PM      Electronically signed by:  Terrence Miramontes PA-C 3/14/2022 12:39 PM

## 2022-03-14 NOTE — TELEPHONE ENCOUNTER
Samina Cardenas from [facility] - IN[facility] called in regards to this. She stated the surgical site is also warm to the touch. Samina Cardenas also stated the patient has an order for his next PT/INR to be 3/18 by his PCP, however she wanted to know if we are supposed to take the PT/INR with [facility]. She stated they did not do PT/INR today. Please advise Samina Cardenas at [phone].

## 2022-03-14 NOTE — TELEPHONE ENCOUNTER
Patient's wife called stating that the patient is experiencing redness around his incision site and the home health came today and took off the outer wrapping to see if that would help reduce the redness but it has not changed. She was wondering what needs to be done.  Please advise patient at [phone]

## 2022-03-15 ENCOUNTER — HOME CARE VISIT (OUTPATIENT)
Dept: SCHEDULING | Facility: HOME HEALTH | Age: 67
End: 2022-03-15
Payer: MEDICARE

## 2022-03-15 ENCOUNTER — HOME CARE VISIT (OUTPATIENT)
Dept: HOME HEALTH SERVICES | Facility: HOME HEALTH | Age: 67
End: 2022-03-15
Payer: MEDICARE

## 2022-03-15 ENCOUNTER — TELEPHONE (OUTPATIENT)
Dept: ORTHOPEDIC SURGERY | Age: 67
End: 2022-03-15

## 2022-03-15 VITALS — HEART RATE: 82 BPM | DIASTOLIC BLOOD PRESSURE: 70 MMHG | SYSTOLIC BLOOD PRESSURE: 130 MMHG | OXYGEN SATURATION: 97 %

## 2022-03-15 VITALS
HEART RATE: 70 BPM | TEMPERATURE: 97.5 F | DIASTOLIC BLOOD PRESSURE: 68 MMHG | OXYGEN SATURATION: 97 % | RESPIRATION RATE: 18 BRPM | SYSTOLIC BLOOD PRESSURE: 124 MMHG

## 2022-03-15 PROCEDURE — G0299 HHS/HOSPICE OF RN EA 15 MIN: HCPCS

## 2022-03-15 PROCEDURE — 3331090002 HH PPS REVENUE DEBIT

## 2022-03-15 PROCEDURE — 3331090001 HH PPS REVENUE CREDIT

## 2022-03-15 PROCEDURE — G0151 HHCP-SERV OF PT,EA 15 MIN: HCPCS

## 2022-03-15 NOTE — Clinical Note
Patient noted to have edema in bilateral lower legs, non pitting 3+, redness noted on the left lateral/medial leg. Wound dressing was over 70% saturated, dressing changed today, incision looks well no s/s of infection. Called Dr. Korina Holliday office, spoke with [facility] to report swelling, redness and patient request to take lasix to decrease swelling, mentioned he had taken it with the first surgery. [facility] stated she would report it. PT/INR taken today, PT 14.8, INR 1.2. Reported to [facility] for further orders.

## 2022-03-15 NOTE — HOME HEALTH
Skilled reason for visit: skilled assessment, education, medication management, wound care, PT/INR 1.2, 14.8    Caregiver involvement:  Family assists ADL's, medications, meals, transportation, errands. Medications reviewed and all medications are available in the home this visit. The following education was provided regarding medications: skilled nurse instructed patient about medication Oxycodone is used to treat moderate to severe pain. Take this medicine exactly as prescribed by your doctor. Never share the medicine with another person. Misuse narcotic pain medication can use addiction overdose, or death, especially in a child or other person using the medicine without a prescription. Common Oxycodone side effects may include: mild drowsiness, headache, dizziness, tired feeling; stomach pain, nausea, vomiting, constipation, loss of appetite, dry mouth, or mild itching. MD notified of any discrepancies/look a-like medications/medication interactions: na  Medications are effective at this time. Home health supplies by type and quantity ordered/delivered this visit include: na    Patient education provided this visit: Skilled nurse instructed patient on safety measures to avoid injuries and falls such as keeping adequate lighting during the day and night and was educated on proper use of assistive device to prevent falls. [place] education provided: na    Patient level of understanding of education provided: patient/caregiver verbalized 100% understanding. Skilled Care Performed this visit: skilled assessment, education, medication management, wound care, pt/inr    Patient response to procedure performed:  patient denied pain and discomfort during procedure/visit    Agency Progress toward goals: progressing    Patient's Progress towards personal goals: progressing    Home exercise program: Sn instructed patient on pursed lip breathing.  Pursed lip breathing is one of the simplest ways to control shortness of breath. It provides a quick and easy way to slow your pace of breathing, making each breath more effective. Pursed lip breathing: Improves ventilation, releases trapped air in the lungs, keeps the airways open longer and decreases the work of breathing, prolongs exhalation to slow the breathing rate, improves breathing patterns by moving old air out of the lungs and allowing for new air to enter the lungs, relieves shortness of breath, causes general relaxation. Practice this technique 4 - 5 times a day at first so you can get the correct breathing pattern. Pursed lip breathing technique: Relax your neck and shoulder muscles, breathe in ( inhale ) slowly through your nose for two counts, keeping your mouth closed. Don't take a deep breath; a normal breath will do. It may help to count to yourself: inhale, one, two. Pucker or purse your lips as if you were going to whistle or gently flicker the flame of a candle. Breathe out ( exhale ) slowly and gently through your pursed lips while counting to four. It may help to count to yourself: exhale, one, two, three, four.     Continued need for the following skills: Nursing and Physical Therapy    Plan for next visit: skilled assessment, education, medication management, pt/inr    Patient and/or caregiver notified and agrees to changes in the Plan of Care N/A      The following discharge planning was discussed with the pt/caregiver:  Discharge planning as follows: when goals met, patient/caregiver able to manage disease process, medications and pain

## 2022-03-15 NOTE — Clinical Note
Patient retaining fluid in BLE;  LLE > RLE   patient stated that he took Lasix after his previous surgery. please advise patient on the matter.

## 2022-03-15 NOTE — TELEPHONE ENCOUNTER
Called and spoke to Jen. Made her aware of 's message below regarding coumadin dosage and INR recheck.

## 2022-03-15 NOTE — TELEPHONE ENCOUNTER
Spoke with Naldo Pritchett with home health and relayed JR's respond to  Ice, elevate.     Come in for an appt if concerned of the wound.     Do PT/INR today.

## 2022-03-15 NOTE — TELEPHONE ENCOUNTER
[place] calling in PT/INR for patient:    PT - 14.8  INR - 1.2    Patient is on 10 mgs Coumadin daily, however, he cut back to 5 mgs Saturday because he was cold. Shelbi can be reached at [phone].

## 2022-03-15 NOTE — HOME HEALTH
Subjective: I have been getting up and walking frequently almost every 2 hours while awake. Caregiver involvement: Spouse assists with household tasks, meal prep, medication and care as needed. Medications reviewed and all medications are available in the home this visit. Medications are effective at this time. no new medication. Patient education provided this visit: educated signs and symptoms of infection. safety with amb with proper footware, and heel-toe pattern. breathing with HEP. Patient level of understanding of education provided: Pt requires reinforcement with gait pattern and breathing tech during HEP. Skilled Care Performed this visit: education and monitoring vital signs with activity. dressing monitored, some redness noted on the lat and medial aspect of acquacell dressing  pictured captured in media, patient reports ace wrap was tight and rubbing, therapist removed acewrap educated pt to elevate LLE while resting     Patient response to procedure performed:  Pt fatigued by end of session, reported pain increase to 6/10. patient to apply ice with barrier between ice and skin for safety. Patient's Progress towards personal goals:  Pt reports no falls. VC for sequencing and body mechanics for transfers and during gait tr. Amb improved from ambstep to pattern to step through w vc, still requires reinforcement for increasing heel strike and increasing L foot clearance. VC for sequencing, proximity to walker       Continued need for the following skills: HHPT is medically necessary to address decreased B LE strength, decreased endurance, decreased standing balance increasing risk for falls.     Plan for next visit: address ROM, standing therex and gait, cont w HEP     The following discharge planning was discussed with the pt/caregiver: dc when goals are met or max potential is reached

## 2022-03-15 NOTE — HOME HEALTH
Subjective: Patient states that he has not fallen nor had any feeling of loss of balance since last visit. He is pleased to report that overall his pain continues to improve. He relays that he tried the newer exercises when completing his HEP and had no significant difficulty with doing so. He has been eating more regularly. Objective: Skilled home health physical therapy interventions completed today listed in care plan section. Interventions performed today to assist in return to prior level of function, address deficits as apparent upon time of initial evaluation, return to community and personal activities, and progress further towards goals as previously established in plan of care. There are not any changes to medications upon timing of this visit. Home health supplies were not ordered/delivered today. Visual inspection finds dressing intact with no more than 35% covered by drainage. Assessment:  Patient is progressing towards goals as previously established in POC with skilled home health physical therapy services at this time as made apparent by improving knee flexion active range of motion and decreased frequency of cueing needed today for gait cycle corrections. Family/caregiver spouse involvement is present/set-up at this point in time and assists with meals, transport, ADLS as necessary, and general encouragement. No noted signs/symptoms of post surgical infection during treatment completion today. Plan:  Discharge planning discussed at this visit regarding eventual discharge once patient has met goals and/or met maximum benefit from home health skilled PT services with patient understanding and agreeable at this time. Continued need for skilled home health PT at this time to address deficits, reduce risk of falls, and obtain goals as previously established per plan of care. Further gait training possibly outside including stair training when weather allows.

## 2022-03-16 ENCOUNTER — HOME CARE VISIT (OUTPATIENT)
Dept: SCHEDULING | Facility: HOME HEALTH | Age: 67
End: 2022-03-16
Payer: MEDICARE

## 2022-03-16 VITALS
TEMPERATURE: 97.8 F | HEART RATE: 81 BPM | DIASTOLIC BLOOD PRESSURE: 80 MMHG | OXYGEN SATURATION: 97 % | RESPIRATION RATE: 16 BRPM | SYSTOLIC BLOOD PRESSURE: 116 MMHG

## 2022-03-16 PROCEDURE — 3331090002 HH PPS REVENUE DEBIT

## 2022-03-16 PROCEDURE — 3331090001 HH PPS REVENUE CREDIT

## 2022-03-16 PROCEDURE — G0151 HHCP-SERV OF PT,EA 15 MIN: HCPCS

## 2022-03-16 NOTE — HOME HEALTH
PT VISIT NOTE  S: Pt. states that his leg feels swollen and has increased pain. O: Medications reconciled with the patient, medications updates as needed. Wife assists with some iADL's, medication management and medical appointments as needed. Gait with FWW AD x 30 feet with min. Pt. required min verbal cues for sequencing and coordination. Pt. demonstrates with decreased hip and knee flexion in pre and mid swing phase of gait on left lower extremity. Pt. demonstrates with slow cadences and decreased stride length. Transfers are min A  with sit to stand and bed to chair. This allows for improved functional mobility and independence. Pt. received therapeutic exercises which included:  heel slide, gluteal sets with isometric hold x 10 seconds, quad set with isometric hold x 10 seconds, hip abd, ham string sets with isometric hold x 10 seconds, heel slides, stretching of the L knee with belt assist, SAQ with isometric hold x 10 seconds. All exercises performed 10 x 1. The need for the therex include lower extremity strengthening to facilitate safe and effective functional mobility, improvement with gait activities and to allow him/her to safely transfer within the home and allow for maximal functional independence. Reviewed HEP with the patient which include  heel slide, gluteal sets with isometric hold x 10 seconds, quad set with isometric hold x 10 seconds, hip abd, ham string sets with isometric hold x 10 seconds, heel slides, stretching of the L knee with belt assist, SAQ with isometric hold x 10 seconds. A: Pt. requires skilled PT for instruction in strengthening activities, balance and coordination activities as well as gait, transfer and safety activities. Patient/Caregiver level of understanding of education provided: Pt. was able to return demonstrate all mobility training and HEP shown with min A.  Patient response to treatment: Patient tolerated treatment well, with no complaint of new pain noted post treatment. Instructed the patient with safety during mobility as well as reviewing the HEP program to facilitate increased independence with all aspects of functional mobility. Instruction with gait sequencing to facilitate increase in gait quality by increasing the hip and knee flexion in pre and mid swing phase of gait. Pt. was able to return demonstrate the gait training by demonstrating an increase in hip and knee flexion in the pre and mid swing phase of gait. Pt. was also able to return demonstrate the HEP as instructed. P: Next session there will be continued focus on transfer, mobility and gait as well as on safety education during all mobility including balance as well as strengthening the hip and knee musculature to allow for an increased level of functional independence with mobility.

## 2022-03-16 NOTE — HOME HEALTH
Subjective: Patient states that he has been amb frequently q 2hours,  He reports that  pain and swelling is limiting mobility, pt does not want to take oxycodene d/t fear of fall. Objective: Skilled home health physical therapy interventions completed today listed in care plan section. Interventions performed today to assist in ROM, strength and gait to return to prior level of function, address deficits   return to community and personal activities, and progress further towards goals as previously established in plan of care. There are not any changes to medications upon timing of this visit. Connie Montoya dressing was changed earlier in the day by [facility] nurse. Assessment:  Patient is progressing slowly towards goals as previously established in POC with skilled home health physical therapy services at this time as made apparent by improving knee flexion active range of motion and decreased frequency of cueing needed today for gait cycle corrections. Family/caregiver spouse involvement is present/set-up at this point in time and assists with meals, transport, ADLS as necessary, and general encouragement. No noted signs/symptoms of post surgical infection during treatment completion today. LLE presents with increased swelling patient reports taking Lasix after previous surgery and requested message be sent to ortho. in chart communication sent to ortho PA  Advised patient to lay supine and elevate LLE to assist in reducing edema  patient sleeps in recliner > 8month. spouse and pt agree to have bed prepared to lay supine . Plan:  Discharge planning discussed at this visit regarding eventual discharge once patient has met goals and/or met maximum benefit from home health skilled PT services with patient understanding and agreeable at this time. Continued need for skilled home health PT at this time to address deficits, reduce risk of falls, and obtain goals as previously established per plan of care.  Further gait training possibly outside including stair training when weather allows.

## 2022-03-17 ENCOUNTER — TELEPHONE (OUTPATIENT)
Dept: ORTHOPEDIC SURGERY | Age: 67
End: 2022-03-17

## 2022-03-17 ENCOUNTER — DOCUMENTATION ONLY (OUTPATIENT)
Dept: ORTHOPEDIC SURGERY | Age: 67
End: 2022-03-17

## 2022-03-17 ENCOUNTER — HOME CARE VISIT (OUTPATIENT)
Dept: SCHEDULING | Facility: HOME HEALTH | Age: 67
End: 2022-03-17
Payer: MEDICARE

## 2022-03-17 ENCOUNTER — HOME CARE VISIT (OUTPATIENT)
Dept: HOME HEALTH SERVICES | Facility: HOME HEALTH | Age: 67
End: 2022-03-17
Payer: MEDICARE

## 2022-03-17 VITALS
SYSTOLIC BLOOD PRESSURE: 130 MMHG | TEMPERATURE: 98.1 F | HEART RATE: 90 BPM | OXYGEN SATURATION: 98 % | DIASTOLIC BLOOD PRESSURE: 70 MMHG

## 2022-03-17 LAB
INR BLD: 16.7 (ref 0.9–1.1)
PT POC: 1.4 SECONDS (ref 11.8–14.9)

## 2022-03-17 PROCEDURE — 3331090001 HH PPS REVENUE CREDIT

## 2022-03-17 PROCEDURE — G0151 HHCP-SERV OF PT,EA 15 MIN: HCPCS

## 2022-03-17 PROCEDURE — G0300 HHS/HOSPICE OF LPN EA 15 MIN: HCPCS

## 2022-03-17 PROCEDURE — 3331090002 HH PPS REVENUE DEBIT

## 2022-03-17 NOTE — TELEPHONE ENCOUNTER
Francisco Hernandez from [facility] called for Kirstie Garcia. Shelbi said :    Pt 16.7    INR 1.4    Coumadin: 5 mg Daily    Shelbi tel. [phone].

## 2022-03-17 NOTE — PROGRESS NOTES
After speaking with Shelbi, she states patient is taking a larger dosage of coumadin than we have prescribed. Patient is called, I spoke with him as well as CARA Hickman Fearing, he was told to take 10mg tomight and recheck tomorrow. A rx is sent to his pharmacy as well for Lasix. I left a vm for Shelbi in summary of this.

## 2022-03-18 ENCOUNTER — HOME CARE VISIT (OUTPATIENT)
Dept: SCHEDULING | Facility: HOME HEALTH | Age: 67
End: 2022-03-18
Payer: MEDICARE

## 2022-03-18 ENCOUNTER — HOME CARE VISIT (OUTPATIENT)
Dept: HOME HEALTH SERVICES | Facility: HOME HEALTH | Age: 67
End: 2022-03-18
Payer: MEDICARE

## 2022-03-18 VITALS
SYSTOLIC BLOOD PRESSURE: 134 MMHG | DIASTOLIC BLOOD PRESSURE: 65 MMHG | TEMPERATURE: 98.8 F | HEART RATE: 7 BPM | RESPIRATION RATE: 18 BRPM | OXYGEN SATURATION: 100 %

## 2022-03-18 PROBLEM — C61 MALIGNANT NEOPLASM OF PROSTATE (HCC): Status: ACTIVE | Noted: 2018-11-14

## 2022-03-18 LAB
INR BLD: 19.2 (ref 0.9–1.1)
PT POC: 1.6 SECONDS (ref 11.8–14.9)

## 2022-03-18 PROCEDURE — 3331090002 HH PPS REVENUE DEBIT

## 2022-03-18 PROCEDURE — 3331090001 HH PPS REVENUE CREDIT

## 2022-03-18 PROCEDURE — G0151 HHCP-SERV OF PT,EA 15 MIN: HCPCS

## 2022-03-18 PROCEDURE — G0300 HHS/HOSPICE OF LPN EA 15 MIN: HCPCS

## 2022-03-18 NOTE — HOME HEALTH
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 Patient has steps present to enter/exit home without handrails present. Medications reconciled and all medications are available in the home this visit. patient states tylenol is controling pain and is effective at this time. Home health supplies by type and quantity ordered/delivered this visit include: none     Patient education provided this visit: Educated patient on need to increase heel-toe gait pattern and to increase L knee/ hip flexion during swing phase to increase L foot clerance. stairs negociation and car transfer today. required vc for squencing and Gypsy managing RW while amb up/down stairs pt does not have handrail at front stairs to enter/exit home. performed car transfer w vc for safety and Gypsy managing LLE in/out of car d/t difficulty bending L knee. Educated pt the need to emphasize L knee flex HEP. Patient's ROM progress limited by LLE edema. Repsonse to treatment: Patient was pleased to amb outside and practice stairs  patient reports  fatigue and increased pain at end of PT session,  patient to apply ice at end of session. pt and caregiver knowledgeable on safe use of cryotherapy. .      Progress toward goals:  L knee flex AAROM 81degrees with pain increasing to 8/10 at max flexion. was able to perform sit to stands with CGA/SBA and vc for L foot placement for ease with ant weight shift. Patient required education to increase L knee extension during stance phase of gait to decrease risk for falls. reported  increase in pain with treatmnent session today. Plan to cont addressing edema, increase ROM and gait quality.      Home exercise program: Patient was instructed on taking a walk at least once per hour to increase time spent in standing. Patient was educated to perform 3-5x daily: in reclined to BLEs: ankle pumps, QS  GS    heel slides while in chair. L hip flex and heel raises while standing  x10-15 reps each. Patient verbalized understanding. Continued need for the following skills: Physical Therapy     The following discharge planning was discussed with the pt/caregiver: d/c DC HHPT to self/caregiver under supervision of MD  when goals are met or max benefits achieved.

## 2022-03-18 NOTE — HOME HEALTH
Skilled reason for visit:lLeft Knee surgery, PT/INR monitorig and Medication Management    Caregiver involvement: Patient independent with ADL;s Spuse assist with meals, and  transportation and medication    Medications reviewed and all medications are available in the home this visit. The following education was provided regarding medications:  tylenol, use for pain relief, importance of not exceeding 3000mg daily and potenital for nausea. .    MD notified of any discrepancies/look a-like medications/medication interactions: n/a  Medications are effective at this time.       Home health supplies by type and quantity ordered/delivered this visit include: n/a    Patient education provided this visit: Patient stated he is taking 10mg daily he took one time dose of 15mg  yesterday only Patient made aware that Dr Jozef Ureña will be adjusting his Coumadin until he is released from his care and that he does not need to contact MD the agency will do it and contact him with changes          Sharps education provided: n/a    Patient level of understanding of education provided: Patient verbalized understanding    Skilled Care Performed this visit: incision assesment PT/INR    Patient response to procedure performed:  Patient tolerated procedure well    Agency Progress toward goals: Continue to assist Patient  managing his medication and prevent infection  Patient's Progress towards personal goals: Patient slowly adjusting and learning to manage pain         Home exercise program: Activities as tolerated    Continued need for the following skills: Nursing,PT    Plan for next visit: incision care PT/INR    Patient and/or caregiver notified and agrees to changes in the Plan of Care YES      The following discharge planning was discussed with the pt/caregiver: dc when incision healed, pain controlled and patient independent with medication regimen

## 2022-03-19 ENCOUNTER — HOME CARE VISIT (OUTPATIENT)
Dept: SCHEDULING | Facility: HOME HEALTH | Age: 67
End: 2022-03-19
Payer: MEDICARE

## 2022-03-19 VITALS
TEMPERATURE: 97.6 F | HEART RATE: 79 BPM | DIASTOLIC BLOOD PRESSURE: 68 MMHG | RESPIRATION RATE: 13 BRPM | OXYGEN SATURATION: 96 % | SYSTOLIC BLOOD PRESSURE: 112 MMHG

## 2022-03-19 VITALS
SYSTOLIC BLOOD PRESSURE: 120 MMHG | HEART RATE: 84 BPM | OXYGEN SATURATION: 98 % | TEMPERATURE: 98.1 F | DIASTOLIC BLOOD PRESSURE: 68 MMHG

## 2022-03-19 VITALS
TEMPERATURE: 98.6 F | OXYGEN SATURATION: 100 % | DIASTOLIC BLOOD PRESSURE: 74 MMHG | HEART RATE: 87 BPM | SYSTOLIC BLOOD PRESSURE: 134 MMHG | RESPIRATION RATE: 18 BRPM

## 2022-03-19 PROBLEM — Z96.659 FAILED TOTAL KNEE REPLACEMENT (HCC): Status: ACTIVE | Noted: 2022-03-09

## 2022-03-19 PROBLEM — T84.018A FAILED TOTAL KNEE REPLACEMENT (HCC): Status: ACTIVE | Noted: 2022-03-09

## 2022-03-19 PROBLEM — Z79.01 WARFARIN ANTICOAGULATION: Status: ACTIVE | Noted: 2017-01-25

## 2022-03-19 PROBLEM — M17.10 ARTHRITIS OF KNEE: Status: ACTIVE | Noted: 2018-03-27

## 2022-03-19 PROCEDURE — G0157 HHC PT ASSISTANT EA 15: HCPCS

## 2022-03-19 PROCEDURE — 3331090002 HH PPS REVENUE DEBIT

## 2022-03-19 PROCEDURE — 3331090001 HH PPS REVENUE CREDIT

## 2022-03-19 NOTE — HOME HEALTH
Skilled reason for visit:lLeft Knee surgery, PT/INR monitorig and Medication Management         Caregiver involvement: Patient independent with ADL;s Spuse assist with meals, and  transportation and medication         Medications reviewed and all medications are available in the home this visit. The following education was provided regarding medications:  nstructed  while take anticoagulation to call doctor if a serious fall or you hit your head, alot of skin bruising,heavy nose bleed, bloody or dark stool or urine       MD notified of any discrepancies/look a-like medications/medication interactions: n/a    Medications are effective at this time.            Home health supplies by type and quantity ordered/delivered this visit include: n/a         Patient education provided this visit: Patient stated he is taking 10mg daily he took one time dose of 15mg  yesterday only Patient made aware that Dr Adolfo Dawson will be adjusting his Coumadin until he is released from his care and that he does not need to contact MD the agency will do it and contact him with changes                        Sharps education provided: n/a         Patient level of understanding of education provided: Patient verbalized understanding         Skilled Care Performed this visit: i PT/INR         Patient response to procedure performed:  Patient tolerated procedure well         Agency Progress toward goals: Continue to assist Patient  managing his medication and prevent infection    Patient's Progress towards personal goals: Patient slowly adjusting and learning to manage pain       Home exercise program: Activities as tolerated         Continued need for the following skills: Nursing,PT         Plan for next visit:PT/INR         Patient and/or caregiver notified and agrees to changes in the Plan of Care YES           The following discharge planning was discussed with the pt/caregiver: dc when incision healed, pain controlled and patient independent with medication regimen

## 2022-03-19 NOTE — Clinical Note
New prescription reported visit of 3/19/2022: Furosemide 20mg tablet take 1 tablet by mouth daily qty 5 filled 3/17/2022 by Jade Kimble PA-C

## 2022-03-19 NOTE — HOME HEALTH
Subjective: Patient relays that he has continued lower extremity edema but feels that he pain her had earlier in the week is improving. He states that the surgeon called in fluid pills for him to take and his wife picked them up from the pharmacy yesterday. Objective: Skilled home health physical therapy interventions completed today listed in care plan section. Interventions performed today to assist in return to prior level of function, address deficits as apparent upon time of initial evaluation, return to community and personal activities, and progress further towards goals as previously established in plan of care. There are changes to medications upon timing of this visit with new prescription Furosemide 20mg tablet take 1 tablet by mouth daily quantity 5 filled 3/17/2022 by Evelina Jo PA-C. Home health supplies were ordered/delivered today. Visual inspection finds dressing intact with no significant drainage noted into dressing. Assessment:  Patient is progressing towards goals as previously established in POC with skilled home health physical therapy services at this time as made apparent by ability to toleate harder therapeutic exercises today. Family/caregiver spouse involvement is present/set-up at this point in time and assists with meals, transport, and ADLs as needed. Patient able to display improved gait cycle stride length today compared to last weekend with less cueing needed for corrections. Plan:  Discharge planning discussed at this visit regarding eventual discharge once patient has met goals and/or met maximum benefit from home health skilled PT services with patient understanding and agreeable at this time. Continued need for skilled home health PT at this time to address deficits, reduce risk of falls, and obtain goals as previously established per plan of care. Monitor edema and inquire as to long term response to progressions from today.

## 2022-03-19 NOTE — HOME HEALTH
Subjective: Patient stated that he did not sleep well d/t migraine and did not want to take migraine medication because he already took tylenol for knee pain    Caregiver involvement: Patient lives in a one story home with his wife who is assiting him at this time with all ADLs and meal preparation. Patient has steps present to enter/exit home without handrails present. Medications reconciled and all medications are available in the home this visit. patient states that lasix is sent to pharmacy by ortho but needs to be picked up  Home health supplies by type and quantity ordered/delivered this visit include: mepilex dressing in the home   Patient education provided this visit: Educated patient on need to increase heel-toe gait pattern and to increase L knee/ hip flexion during swing phase to increase L foot clerance. Educated pt the need to emphasize L knee flex HEP. Patient's ROM progress limited by LLE edema. L knee flex 86degrees during standing AAROM therex using step ladder not able to reproduce same ROM in sitting  Repsonse to treatment: Patient reports increased pain at end of PT session to 8/10 patient to apply ice at end of session. pt and caregiver knowledgeable on safe use of cryotherapy. .   Progress toward goals: L knee flex AAROM 81degrees with pain increasing to 8/10 at max flexion. was able to perform sit to stands with CGA/SBA and vc for L foot placement for ease with ant weight shift. Patient required education to increase L knee extension during stance phase of gait to decrease risk for falls. reported increase in pain with treatmnent session today. Plan to cont addressing edema, increase ROM and gait quality. Home exercise program: Patient was instructed on taking a walk at least once per hour to increase time spent in standing. Patient was educated to perform 3-5x daily: in reclined to BLEs: ankle pumps, QS GS heel slides while in chair.  L hip flex and heel raises while standing x10-15 reps each. Patient verbalized understanding. Continued need for the following skills: Physical Therapy   The following discharge planning was discussed with the pt/caregiver: d/c DC HHPT to self/caregiver under supervision of MD when goals are met or max benefits achieved.

## 2022-03-20 ENCOUNTER — HOME CARE VISIT (OUTPATIENT)
Dept: SCHEDULING | Facility: HOME HEALTH | Age: 67
End: 2022-03-20
Payer: MEDICARE

## 2022-03-20 PROBLEM — M62.830 MUSCLE SPASM OF BACK: Status: ACTIVE | Noted: 2017-03-07

## 2022-03-20 PROBLEM — M17.12 PRIMARY OSTEOARTHRITIS OF LEFT KNEE: Status: ACTIVE | Noted: 2018-02-01

## 2022-03-20 PROCEDURE — 3331090002 HH PPS REVENUE DEBIT

## 2022-03-20 PROCEDURE — G0157 HHC PT ASSISTANT EA 15: HCPCS

## 2022-03-20 PROCEDURE — 3331090001 HH PPS REVENUE CREDIT

## 2022-03-21 ENCOUNTER — HOME CARE VISIT (OUTPATIENT)
Dept: SCHEDULING | Facility: HOME HEALTH | Age: 67
End: 2022-03-21
Payer: MEDICARE

## 2022-03-21 ENCOUNTER — TELEPHONE (OUTPATIENT)
Dept: ORTHOPEDIC SURGERY | Age: 67
End: 2022-03-21

## 2022-03-21 VITALS
RESPIRATION RATE: 14 BRPM | HEART RATE: 68 BPM | DIASTOLIC BLOOD PRESSURE: 78 MMHG | TEMPERATURE: 97.8 F | OXYGEN SATURATION: 97 % | SYSTOLIC BLOOD PRESSURE: 124 MMHG

## 2022-03-21 VITALS
OXYGEN SATURATION: 96 % | DIASTOLIC BLOOD PRESSURE: 70 MMHG | TEMPERATURE: 97.9 F | HEART RATE: 86 BPM | SYSTOLIC BLOOD PRESSURE: 125 MMHG

## 2022-03-21 VITALS
SYSTOLIC BLOOD PRESSURE: 131 MMHG | RESPIRATION RATE: 18 BRPM | DIASTOLIC BLOOD PRESSURE: 67 MMHG | OXYGEN SATURATION: 98 % | TEMPERATURE: 98.6 F | HEART RATE: 82 BPM

## 2022-03-21 PROCEDURE — 3331090001 HH PPS REVENUE CREDIT

## 2022-03-21 PROCEDURE — G0151 HHCP-SERV OF PT,EA 15 MIN: HCPCS

## 2022-03-21 PROCEDURE — 3331090002 HH PPS REVENUE DEBIT

## 2022-03-21 PROCEDURE — G0300 HHS/HOSPICE OF LPN EA 15 MIN: HCPCS

## 2022-03-21 NOTE — TELEPHONE ENCOUNTER
Shelbi from [employer] called to report PT INR values for today. PT 20.0  INR 1.7    Patient has been taking Coumadin 10 mg daily except for on Sundays he takes Coumadin 5 mg. She states today will likely be their last visit with the patient. He does have a post op scheduled for this Thursday 03/24/22. She reports to let her know if she needs to do another PT INR draw or if patient should continue with PCP for these levels to be checked.       Cooper Bella [phone]

## 2022-03-21 NOTE — HOME HEALTH
Skilled reason for visit: Skilled nursing assessment, wound care and medication review and education    Caregiver involvement: Wife shonda is int he home and assists with appointments and education    Medications reviewed and all medications are available in the home this visit. The following education was provided regarding medications:  Medications have been reviewed. No questions at this time    MD notified of any discrepancies/look a-like medications/medication interactions: none  Medications are effective at this time. Home health supplies by type and quantity ordered/delivered this visit include: Supplies are available in the home    Patient education provided this visit:Monitor incision for signs ad symptoms if infection  including redness, odor to drainage, warmth at site, increased pain, temp > 101. Sharps education provided: Clinician instructed patient/CG on proper disposal of sharps: Containers should be made of hard plastic, be puncture-resistant and leakproof,   such as a laundry detergent or bleach bottle.  When the container is ¾ full, it should be sealed with tape and labeled   DO NOT RECYCLE prior to discarding in the regular trash.      Patient level of understanding of education provided: Patient asked questions throughout nursing visit, took notes and verbalized understanding of information given. Skilled Care Performed this visit: Skilled nursing assessment, wound care and medication review and education. PT/INR performed PT 20.0/INR 1.7--Message left for MD per Shelbi Borja-awaiting call back    Patient response to procedure performed:   Patient tolerated treatment without complaints of pain or discomfort.     Patient's Progress towards personal goals: Patient continues to comply with physicians orders and keep medical appointments to work toward accomplishing goals set and discharge    Home exercise program: sn instructed patient to perform deep breathing exercises, 5-6 breaths 5 x daily to promote air exchange and prevent pneumonia     Continued need for the following skills: Physical Therapy    Plan for next visit: Discharge from nursing today    Patient and/or caregiver notified and agrees to changes in the Plan of Care YES      The following discharge planning was discussed with the pt/caregiver: Discharge from nursing today.   Physical therapy to continue until Thursday 3/24/22

## 2022-03-22 ENCOUNTER — HOME CARE VISIT (OUTPATIENT)
Dept: SCHEDULING | Facility: HOME HEALTH | Age: 67
End: 2022-03-22
Payer: MEDICARE

## 2022-03-22 ENCOUNTER — HOME CARE VISIT (OUTPATIENT)
Dept: HOME HEALTH SERVICES | Facility: HOME HEALTH | Age: 67
End: 2022-03-22
Payer: MEDICARE

## 2022-03-22 VITALS
RESPIRATION RATE: 17 BRPM | TEMPERATURE: 97.8 F | DIASTOLIC BLOOD PRESSURE: 65 MMHG | SYSTOLIC BLOOD PRESSURE: 120 MMHG | HEART RATE: 83 BPM | OXYGEN SATURATION: 99 %

## 2022-03-22 LAB
INR BLD: 1.7 (ref 0.9–1.1)
PT POC: 20 SECONDS (ref 11.8–14.9)

## 2022-03-22 PROCEDURE — G0151 HHCP-SERV OF PT,EA 15 MIN: HCPCS

## 2022-03-22 PROCEDURE — 3331090002 HH PPS REVENUE DEBIT

## 2022-03-22 PROCEDURE — 3331090001 HH PPS REVENUE CREDIT

## 2022-03-22 NOTE — TELEPHONE ENCOUNTER
Contacted pt at his home number. Informed pt of the recommendations for his coumadin at this time. Pt thanked writer for the information.

## 2022-03-23 ENCOUNTER — HOME CARE VISIT (OUTPATIENT)
Dept: SCHEDULING | Facility: HOME HEALTH | Age: 67
End: 2022-03-23
Payer: MEDICARE

## 2022-03-23 PROCEDURE — 3331090002 HH PPS REVENUE DEBIT

## 2022-03-23 PROCEDURE — G0151 HHCP-SERV OF PT,EA 15 MIN: HCPCS

## 2022-03-23 PROCEDURE — 3331090001 HH PPS REVENUE CREDIT

## 2022-03-23 NOTE — HOME HEALTH
Subjective: Patient verbalized desire to practice tub transfer. Caregiver involvement: Patient lives in a one story home with his wife who is assiting him at this time with all ADLs and meal preparation. Patient has steps present to enter/exit home without handrails present. Medications reconciled and all medications are available in the home this visit. patient states that he has not taken oxycodone in >5days pain is manageable with ice, and tylenol   Home health supplies by type and quantity ordered/delivered this visit include: mepilex dressing in the home   Patient education provided this visit: tub transfer training today. spouse present for caregiver education. patient has shower/ tub chair, emphasized the need to reach back to control descent to chair. pt able to perform at [facility] first try and 2nd attempt performed at [facility] using rw  performed car transfer at in /out at [facility] with vc to scoot further in the seat in order to lift RLE into the car and reduce the need for >90knee flexion   performed gait training on sidewalk on uneven surface 300ft at [facility] using RW  vc for upright posture, vc for heel-toe gait pattern and to increase L knee/ hip flexion during swing phase to increase L foot clerance. performed step backward along kitchen counter and use of cane to address L knee terminal extension during Performed L knee flexion stretch using step stool and weight shifting forward in order for improving gait and encourage L foot clearance and reduce fall risk. Repsonse to treatment: Patient reports increased pain at end of PT session to 7/10 patient to apply ice at end of session. pt and caregiver knowledgeable on safe use of cryotherapy. Progress toward goals: L knee flex ROM improving as demo by patient not requiring to slide L foot forward when sitting down. pt still present with stagger feet position   . reported increase in pain with treatmnent session today.   Plan to cont addressing edema, increase ROM and gait quality. Home exercise program: Patient was instructed on taking a walk at least once per hour to increase time spent in standing. Patient was educated to perform 3-5x daily: in reclined to BLEs: ankle pumps, QS GS heel slides while in chair. L hip flex and heel raises while standing x10-15 reps each. Patient verbalized understanding. Continued need for the following skills: Physical Therapy The following discharge planning was discussed with the pt/caregiver: d/c   DC HHPT to self/caregiver under supervision of MD when goals are met or max benefits achieved.   tentative dc on Thursday 24 March after ortho f/u

## 2022-03-23 NOTE — HOME HEALTH
PT reassessment note   Subjective:  reports that swelling is getting better since he started Lasix on Saturday   Objective:   TUG  23.64 sec   5 x sit <> stand 35 sec   AAROM L knee flex   84 degrees  AAROM L knee ext -12 degrees    Medications reconciled  and all medications are available in the home this visit. Medications are effective at this time. no new medication  Patient education provided this visit: body mechanics with mobility, fall risk increase AAROM HEP within tolerable pain range and to use ice after of therex  Patient level of understanding of education provided: Pt verbalized and demo understanding of educated material   Wounds:  L knee dressing with mepilex dressing scheduled for dressing change later today. dressing intact no drainage noted no redness noted   No S &S of infection to wound area. Gait Training performed in order to reduce gait impairments and reduce the risk for falls:   feet in home with step through gait w vc to increase L stance and increase R stride. Assessment and progress towards goals:      Patient demonstrated a + result to therapy this date as evidenced by increase in AROM, decreased pain, improved gait distance and gait quality improving. Progressing well towards goals for improving   strength and balance. Patient continues to have deficits in increased swelling, decreased ROM and strength  Patient will benefit from continued intervention with progression of strength, ROM and all deficits. Continued need for the following skills:  Home health physical therapy is medically necessary to address pain, decreased ROM, decreased strength, increased swelling , decreased independence with functional transfers, impaired gait, impaired stair negotiation, in order to improve functional independence, quality of life, return to PLOF, reduce the risk for falls, and reduce pain.       Plan:  continue daily treatments until Thursday 24 March   Discharge planning discussed with patient/caregiver and d/c to self and family under MD supervision once all goals have been met or patient has reached maximum potential: DC tentatively scheduled on Thursday 24th after Ortho f/u

## 2022-03-24 ENCOUNTER — HOME CARE VISIT (OUTPATIENT)
Dept: SCHEDULING | Facility: HOME HEALTH | Age: 67
End: 2022-03-24
Payer: MEDICARE

## 2022-03-24 ENCOUNTER — OFFICE VISIT (OUTPATIENT)
Dept: ORTHOPEDIC SURGERY | Age: 67
End: 2022-03-24
Payer: MEDICARE

## 2022-03-24 VITALS
RESPIRATION RATE: 16 BRPM | DIASTOLIC BLOOD PRESSURE: 80 MMHG | OXYGEN SATURATION: 95 % | SYSTOLIC BLOOD PRESSURE: 120 MMHG | HEART RATE: 88 BPM | TEMPERATURE: 98 F

## 2022-03-24 VITALS
TEMPERATURE: 97.9 F | DIASTOLIC BLOOD PRESSURE: 60 MMHG | SYSTOLIC BLOOD PRESSURE: 120 MMHG | HEART RATE: 81 BPM | OXYGEN SATURATION: 98 % | RESPIRATION RATE: 16 BRPM

## 2022-03-24 VITALS
WEIGHT: 226.2 LBS | TEMPERATURE: 97 F | RESPIRATION RATE: 16 BRPM | BODY MASS INDEX: 32.38 KG/M2 | HEART RATE: 87 BPM | OXYGEN SATURATION: 96 % | HEIGHT: 70 IN

## 2022-03-24 DIAGNOSIS — Z96.652 S/P REVISION OF TOTAL KNEE, LEFT: Primary | ICD-10-CM

## 2022-03-24 DIAGNOSIS — M25.562 LEFT KNEE PAIN, UNSPECIFIED CHRONICITY: ICD-10-CM

## 2022-03-24 PROCEDURE — G0151 HHCP-SERV OF PT,EA 15 MIN: HCPCS

## 2022-03-24 PROCEDURE — 3331090001 HH PPS REVENUE CREDIT

## 2022-03-24 PROCEDURE — 99024 POSTOP FOLLOW-UP VISIT: CPT | Performed by: PHYSICIAN ASSISTANT

## 2022-03-24 PROCEDURE — 3331090002 HH PPS REVENUE DEBIT

## 2022-03-24 RX ORDER — HYDROCODONE BITARTRATE AND ACETAMINOPHEN 7.5; 325 MG/1; MG/1
1-2 TABLET ORAL
Qty: 42 TABLET | Refills: 0 | Status: CANCELLED | OUTPATIENT
Start: 2022-03-24 | End: 2022-03-31

## 2022-03-24 RX ORDER — OXYCODONE AND ACETAMINOPHEN 10; 325 MG/1; MG/1
1-2 TABLET ORAL
Qty: 42 TABLET | Refills: 0 | Status: CANCELLED | OUTPATIENT
Start: 2022-03-24 | End: 2022-03-31

## 2022-03-24 NOTE — HOME HEALTH
PT VISIT NOTE  S: Pt. states that his swelling has decreased  O: Medications reconciled with the patient, medications updates as needed. Wife assists with some iADL's, medication management and medical appointments as needed. Gait with FWW AD x 100 feet with SBA. Pt. required no verbal cues for sequencing and coordination. Pt. demonstrates with decreased hip and knee flexion in pre and mid swing phase of gait on left lower extremity. Pt. demonstrates with slow cadences and decreased stride length. Transfers are SBA  with sit to stand and bed to chair. This allows for improved functional mobility and independence. Pt. received therapeutic exercises which included:  Squats, marching in place, Hip abd/add, toe raises and hip ext. x 15. The need for the therex include lower extremity strengthening to facilitate safe and effective functional mobility, improvement with gait activities and to allow him/her to safely transfer within the home and allow for maximal functional independence. Reviewed HEP with the patient which include  Squats, marching in place, Hip abd/add, toe raises and hip ext. A: Pt. requires skilled PT for instruction in strengthening activities, balance and coordination activities as well as gait, transfer and safety activities. Patient/Caregiver level of understanding of education provided: Pt. was able to return demonstrate all mobility training and HEP shown with min A. Patient response to treatment: Patient tolerated treatment well, with no complaint of new pain noted post treatment. Instructed the patient with safety during mobility as well as reviewing the HEP program to facilitate increased independence with all aspects of functional mobility. Instruction with gait sequencing to facilitate increase in gait quality by increasing the hip and knee flexion in pre and mid swing phase of gait.  Pt. was able to return demonstrate the gait training by demonstrating an increase in hip and knee flexion in the pre and mid swing phase of gait. Pt. was also able to return demonstrate the HEP as instructed. P: Next session there will be continued focus on transfer, mobility and gait as well as on safety education during all mobility including balance as well as strengthening the hip and knee musculature to allow for an increased level of functional independence with mobility.

## 2022-03-24 NOTE — PROGRESS NOTES
[address] [facility], [address] [facility]  [phone]           Patient: Cesilia Orta                MRN: [id number]       SSN: [id number]  YOB: 1955        AGE: 74 y.o. SEX: male  Body mass index is 32.46 kg/m². PCP: Sowmya Silverman NP  03/24/22      This office note has been dictated. REVIEW OF SYSTEMS:  Constitutional: Negative for fever, chills, weight loss and malaise/fatigue. HENT: Negative. Eyes: Negative. Respiratory: Negative. Cardiovascular: Negative. Gastrointestinal: No bowel incontinence or constipation. Genitourinary: No bladder incontinence or saddle anesthesia. Skin: Negative. Neurological: Negative. Endo/Heme/Allergies: Negative. Psychiatric/Behavioral: Negative. Musculoskeletal: As per HPI above. Past Medical History:   Diagnosis Date    Arthritis     Chronic pain     knee and shoulder    DVT (deep venous thrombosis) ([facility] [address] 1992    post sx. R LEG    DVT (deep venous thrombosis) ([facility]) 2000    POST BACK SX. 2- LEFT LEG    GERD (gastroesophageal reflux disease)     Headache(784.0)     everyday    High cholesterol     Hypertension     Prostate cancer ([facility] [address] 2018    Pure hypercholesterolemia     Spinal stenosis     Thromboembolus ([facility])          Current Outpatient Medications:     furosemide (LASIX) 20 mg tablet, Take 1 Tablet by mouth daily. , Disp: 5 Tablet, Rfl: 0    docusate sodium (Colace) 100 mg capsule, Take 1 Capsule by mouth two (2) times a day for 90 days. , Disp: 60 Capsule, Rfl: 2    warfarin (COUMADIN) 3 mg tablet, TAKE 1 TABLET BY MOUTH DAILY FOR 21 DAYS, Disp: 90 Tablet, Rfl: 3    ondansetron hcl (Zofran) 4 mg tablet, Take 1 Tablet by mouth every eight (8) hours as needed for Nausea., Disp: 12 Tablet, Rfl: 0    clotrimazole-betamethasone (LOTRISONE) 1-0.05 % lotion, Apply  to affected area two (2) times a day., Disp: 30 mL, Rfl: 0    gabapentin (NEURONTIN) 100 mg capsule, Take 2 Capsules by mouth nightly. Max Daily Amount: 200 mg. (Patient taking differently: Take 100 mg by mouth nightly.), Disp: 180 Capsule, Rfl: 0    allopurinoL (ZYLOPRIM) 100 mg tablet, Take 1 Tablet by mouth two (2) times a day., Disp: 60 Tablet, Rfl: 0    L gasseri/B bifidum/B longum (MCALLISTER [facility]ril[facility]), Take 1 Tablet by mouth nightly., Disp: , Rfl:     lansoprazole (PREVACID) 30 mg capsule, Take 30 mg by mouth daily. , Disp: , Rfl:     warfarin (COUMADIN) 5 mg tablet, TAKE 2 AND 1/2 TABLETS BY MOUTH DAILY OR AS DIRECTED (Patient taking differently: Take  by mouth daily. Patient takes 2  TABLETS BY MOUTH Daily or as directed), Disp: 75 Tab, Rfl: 0    lisinopril-hydroCHLOROthiazide (PRINZIDE, ZESTORETIC) 20-12.5 mg per tablet, TAKE 1 TABLET BY MOUTH DAILY (Patient taking differently: Take 1 Tab by mouth daily. TAKE 1 TABLET BY MOUTH DAILY), Disp: 90 Tab, Rfl: 1    labetalol (NORMODYNE) 100 mg tablet, TAKE 1 TABLET BY MOUTH TWICE DAILY. STOP VERAPAMIL (Patient taking differently: Take  by mouth two (2) times a day.), Disp: 180 Tab, Rfl: 0    butalbital-acetaminophen-caff (FIORICET) -40 mg per capsule, 2 cap three times per day as needed for headache (Patient taking differently: Take 1 Capsule by mouth daily. 2 cap three times per day as needed for headache), Disp: 180 Cap, Rfl: 1    ALPRAZolam (XANAX) 0.5 mg tablet, Take one half(1/2) tab to one(1) tab by mouth at bedtime as needed for sleep (Patient taking differently: Take  by mouth nightly as needed.  Take one half(1/2) tab to one(1) tab by mouth at bedtime as needed for sleep), Disp: 30 Tab, Rfl: 0    montelukast (SINGULAIR) 10 mg tablet, TAKE 1 TABLET BY MOUTH EVERY DAY, Disp: 90 Tab, Rfl: 0    acetaminophen (TYLENOL) 500 mg tablet, Take 1,000 mg by mouth every six (6) hours as needed for Pain., Disp: , Rfl:     Allergies   Allergen Reactions    Amoxicillin Itching    Augmentin [Amoxicillin-Pot Clavulanate] Itching    Chlorhexidine Towelette Itching    Hibiclens [Chlorhexidine Gluconate] Itching    Milk Containing Products Diarrhea    Nsaids (Non-Steroidal Anti-Inflammatory Drug) Other (comments)     On blood thinner, contraindicated.  Penicillins Rash    Requip [Ropinirole] Nausea and Vomiting       Social History     Socioeconomic History    Marital status:      Spouse name: Not on file    Number of children: Not on file    Years of education: Not on file    Highest education level: Not on file   Occupational History    Not on file   Tobacco Use    Smoking status: Never Smoker    Smokeless tobacco: Never Used   Vaping Use    Vaping Use: Never used   Substance and Sexual Activity    Alcohol use: No    Drug use: Never    Sexual activity: Not Currently   Other Topics Concern     Service Not Asked    Blood Transfusions Not Asked    Caffeine Concern Not Asked    Occupational Exposure Not Asked    Hobby Hazards Not Asked    Sleep Concern Not Asked    Stress Concern Not Asked    Weight Concern Not Asked    Special Diet Not Asked    Back Care Not Asked    Exercise Not Asked    Bike Helmet Not Asked    Seat Belt Not Asked    Self-Exams Not Asked   Social History Narrative    Not on file     Social Determinants of Health     Financial Resource Strain:     Difficulty of Paying Living Expenses: Not on file   Food Insecurity:     Worried About Running Out of Food in the Last Year: Not on file    Marley of Food in the Last Year: Not on file   Transportation Needs:     Lack of Transportation (Medical): Not on file    Lack of Transportation (Non-Medical):  Not on file   Physical Activity:     Days of Exercise per Week: Not on file    Minutes of Exercise per Session: Not on file   Stress:     Feeling of Stress : Not on file   Social Connections:     Frequency of Communication with Friends and Family: Not on file    Frequency of Social Gatherings with Friends and Family: Not on file    Attends Mor[employer] Services: Not on file    Active Member of Clubs or Organizations: Not on file    Attends Club or Organization Meetings: Not on file    Marital Status: Not on file   Intimate Partner Violence:     Fear of Current or Ex-Partner: Not on file    Emotionally Abused: Not on file    Physically Abused: Not on file    Sexually Abused: Not on file   Housing Stability:     Unable to Pay for Housing in the Last Year: Not on file    Number of [place] in the Last Year: Not on file    Unstable Housing in the Last Year: Not on file       Past Surgical History:   Procedure Laterality Date    HX HEENT Right 09/11/2018    eye surgery, macular     HX KNEE REPLACEMENT Left 2018    HX LUMBAR LAMINECTOMY  1992    HX LUMBAR LAMINECTOMY  2000    HX ORTHOPAEDIC  06-25-12    Right foot with excision of bursa and adipose tissue from fifth metatarsal base by Dr. Karen Pickens Left 03/09/2022    left knee revision    HX PROSTATECTOMY  11/2018    HX ROTATOR CUFF REPAIR Right 01/28/2019    by Dr. Brittney Duran ARTHROSCOPY Left 12/2020    WORK RELATED INJURY           Patient seen evaluated today for his left total knee revision. He is now 15 days status post surgery. Is doing well. He did have some swelling in his leg. He was placed on Lasix which did help. He has had no chest pain or shortness of breath. He does continue on Coumadin. He has been working with home health physical therapy the complications. He is ambulating with a rolling walker. He has been taken Tylenol for pain which is not really enough. He does not want to take Percocet is too strong for him. Patient denies recent fevers, chills, chest pain, SOB, or injuries. No recent systemic changes noted. A 12-point review of systems is performed today. Pertinent positives are noted. All other systems reviewed and otherwise are negative. Physical exam: General: Alert and oriented x3, nad. well-developed, well nourished. normal affect, AF. NC/AT, EOMI, neck supple, trachea midline, no JVD present. Breathing is non-labored. Examination of the left knee reveals skin intact. The surgical wound is healing nicely. Does have a little bit of ecchymosis with a touch of erythema laterally. Minimal effusion. Negative ballottement. Range of motion is near full extension to proximal 90 degrees of flexion. Negative calf tenderness. Negative Homans. No signs of DVT present. Minimal edema noted. Assessment: Status post revision left knee replacement    Plan: At this point, the staples were removed and replaced with Steri-Strips left complications. He will be set up with outpatient physical therapy. A prescription for [place] be sent to his pharmacy. He is instructed on use and precautions. He is instructed on range of motion activities on his own on an hourly basis per flexion extension. We will continue on Coumadin per protocol. He will continue with ice therapy. We will see him back in 2 weeks time for evaluation and range of motion check.           JR Willie GATES, ESPERANZA, ATC

## 2022-03-25 ENCOUNTER — TELEPHONE (OUTPATIENT)
Dept: ORTHOPEDIC SURGERY | Age: 67
End: 2022-03-25

## 2022-03-25 DIAGNOSIS — Z96.652 S/P REVISION OF TOTAL KNEE, LEFT: Primary | ICD-10-CM

## 2022-03-25 RX ORDER — HYDROCODONE BITARTRATE AND ACETAMINOPHEN 7.5; 325 MG/1; MG/1
1-2 TABLET ORAL
Qty: 42 TABLET | Refills: 0 | Status: SHIPPED | OUTPATIENT
Start: 2022-03-25 | End: 2022-04-01

## 2022-03-25 RX ORDER — CLINDAMYCIN HYDROCHLORIDE 300 MG/1
300 CAPSULE ORAL 3 TIMES DAILY
Qty: 21 CAPSULE | Refills: 0 | Status: SHIPPED | OUTPATIENT
Start: 2022-03-25 | End: 2022-04-01

## 2022-03-25 NOTE — HOME HEALTH
Patient is being discharged to self/caregiver under MD supervision since all goals have been met and education has been completed, patient is medically stable and patient/caregiver are able to independently manage medications, Meds reconciled/ reviewed,  and disease process and patient no longer requires skilled care. Patient is aware to follow up with PCP/Surgeon as ordered. Opportunity for questions provided at this time. Patient aware to refer any questions after discharge to MD office. MD notified of DC.     Patient given script to start outpatient awaiting call back from clinic

## 2022-03-25 NOTE — TELEPHONE ENCOUNTER
Patient says 2 prescriptions were supposed to be called in yesterday to [facility] on [address] but they have no record. Please call prescriptions in for patient to fill.

## 2022-04-04 ENCOUNTER — HOSPITAL ENCOUNTER (OUTPATIENT)
Dept: PHYSICAL THERAPY | Age: 67
Discharge: HOME | End: 2022-04-04
Payer: MEDICARE

## 2022-04-04 PROCEDURE — 97110 THERAPEUTIC EXERCISES: CPT

## 2022-04-04 PROCEDURE — 97161 PT EVAL LOW COMPLEX 20 MIN: CPT

## 2022-04-04 NOTE — PROGRESS NOTES
[facility]  [address] [facility]  [phone] fax    Plan of Care/ Statement of Necessity for Physical Therapy Services    Patient name: Jonelle Bashir Start of Care: 2022   Referral source: Abe Carr : 1955    Medical Diagnosis: Failed total left knee replacement ([address] Nenita Velarde  Payor: Robert Case / Plan: [place] & [place] / Product Type: Medicare /  Onset Date: 3/9/22    Treatment Diagnosis: left knee pain   Prior Hospitalization: see medical history Provider#: [id number]   Medications: Verified on Patient summary List    Comorbidities: HTN, arthritis, history of DVT, history of cancer   Prior Level of Function: Ind with ambulation, hiking, hunting, fishing, Ind with ADLs     The Plan of Care and following information is based on the information from the initial evaluation. Assessment/ key information:  Pt. Is a 74year old male s/p left TKA revision on 3/9/22. He reports his first surgery was in 2018. He reports having 2 weeks of home health PT following surgery and he is now using [facility] for ambulation. He reports having most difficulty with bending his knee because of swelling behind his knee. He presents with decreased left knee AROM 10-83 degrees with pain at end ranges. He also has decreased left knee strength with significant lag during SLR. Mid patella girth measurement right: 44 cm left: 46 cm. He required B UE for sit to stand transfers. Skilled PT is medically necessary in order to improve left knee mobility and strength for increased ease of ambulation and improved independence at home.      Evaluation Complexity History MEDIUM  Complexity : 1-2 comorbidities / personal factors will impact the outcome/ POC ; Examination MEDIUM Complexity : 3 Standardized tests and measures addressing body structure, function, activity limitation and / or participation in recreation  ;Presentation LOW Complexity : Stable, uncomplicated  ;Clinical Decision Making MEDIUM Complexity : FOTO score of 26-74  Overall Complexity Rating: LOW   Problem List: pain affecting function, decrease ROM, decrease strength, edema affecting function, impaired gait/ balance, decrease ADL/ functional abilitiies, decrease activity tolerance, decrease flexibility/ joint mobility and decrease transfer abilities   Treatment Plan may include any combination of the following: Therapeutic exercise, Therapeutic activities, Neuromuscular re-education, Physical agent/modality, Gait/balance training, Manual therapy, Patient education, Self Care training and Functional mobility training   Vasopnuematic compression justification:  Per bilateral girth measures taken and listed above the edema is considered significant and having an impact on the patient's gait, self care and ADLs  Patient / Family readiness to learn indicated by: asking questions  Persons(s) to be included in education: patient (P)  Barriers to Learning/Limitations: None  Patient Goal (s): to get back to normal  Patient Self Reported Health Status: good  Rehabilitation Potential: good    Short Term Goals: To be accomplished in 1 weeks:  1. Patient will demonstrate compliance with HEP in order to improve left knee mobility for increased ease of ADLs    Long Term Goals: To be accomplished in 4 weeks:  1. Patient will improve FOTO score by 26 points in order to demonstrate a significant improvement in function. 2. Patient will improve left knee AROM 0-100 degrees in order to increase ease of ambulation. 3. Patient will improve left knee MMT to 4/5 in order to increase ease of stair negotiation. 4. Patient will perform a sit to stand transfer without UE support in order to increase ease of transfers at home. Frequency / Duration: Patient to be seen 2-3 times per week for 4 weeks.     Patient/  Caregiver education and instruction: Diagnosis, prognosis, exercises   [x]  Plan of care has been reviewed with PTA    Certification period: 4/4/22-5/3/22    David Cunha, PT 4/4/2022 9:52 AM    ________________________________________________________________________    I certify that the above Therapy Services are being furnished while the patient is under my care. I agree with the treatment plan and certify that this therapy is necessary.     [de-identified] Signature:____________Date:_________TIME:________     Lorena Amaya PA-C  ** Signature, Date and Time must be completed for valid certification **    Please sign and return to [facility] OF MARY ANDRADE  [address] [place] [address]  [phone] fax

## 2022-04-04 NOTE — PROGRESS NOTES
PT DAILY TREATMENT NOTE/KNEE EVAL     Patient Name: Thaddeus Velazco  Date:2022  : 1955  [x]  Patient  Verified  Payor: Ayleen Daniel / Plan: [place] MEDICARE PART A & B / Product Type: Medicare /    In time: 9:01  Out time: 9:40  Total Treatment Time (min): 39  Visit #: 1 of     Medicare/BCBS Only   Total Timed Codes (min):  8 1:1 Treatment Time:  39     Treatment Area: Failed total left knee replacement (HCC) [T84.093A]    SUBJECTIVE  Pain Level (0-10 scale):  4/10  []constant []intermittent []improving []worsening []no change since onset    Any medication changes, allergies to medications, adverse drug reactions, diagnosis change, or new procedure performed?: [x] No    [] Yes (see summary sheet for update)  Subjective functional status/changes:       Mechanism of Injury: pt. Reports 2018 had the first left TKA. 3/9/22 had left TKA revision. Had home health PT for 2 weeks. He reports he feel twice when he got home with no injury. Reports he just started using cane. Difficulty with bending because of swelling. Reports initially having swelling in whole leg but denies calf pain. Reports swelling in back of knee. History of blood clots. Reports he is taking blood thinners. Likes to go hiking, fishing, hunting. Difficulty putting shoe on by himself  Work Hx: retired   Living Situation:  3-4 steps to enter house.  Lives with wife  Pt Goals: to get back to normal.     OBJECTIVE/EXAMINATION    8 min Therapeutic Exercise:  [] See flow sheet : HEP   Rationale: increase ROM and increase strength to improve the patients ability to increase ease of ADLs          With   [x] TE   [] TA   [] neuro   [] other: Patient Education: [x] Review HEP    [] Progressed/Changed HEP based on:   [] positioning   [] body mechanics   [] transfers   [] heat/ice application    [] other:      Physical Therapy Evaluation - Knee      Gait:  [] Normal    [x] Abnormal    [x] Antalgic    [] NWB    Device: SPC    Describe: decreased weight shift to left side, decreased step length, slow speed    ROM / Strength  [] Unable to assess                  AROM                      PROM                   Strength (1-5)    Left Right Left Right Left Right   Hip Flexion     3+ 4    Extension          Abduction          Adduction         Knee Flexion 83 117  85 3+ 4+    Extension 10 0   3 4   Ankle Plantarflexion     4+ 4+    Dorsiflexion     3 4+         Flexibility: [] Unable to assess at this time  Hamstrings:    (L) Tightness= [] WNL   [] Min   [x] Mod   [] Severe    (R) Tightness= [] WNL   [x] Min   [] Mod   [] Severe  Quadriceps:    (L) Tightness= [] WNL   [] Min   [] Mod   [] Severe    (R) Tightness= [] WNL   [] Min   [] Mod   [] Severe  Gastroc:      (L) Tightness= [] WNL   [] Min   [] Mod   [] Severe    (R) Tightness= [] WNL   [] Min   [] Mod   [] Severe  Other:    Palpation:   Neg/Pos  Neg/Pos  Neg/Pos   Joint Line x Quad tendon  Patellar ligament    Patella x Fibular head  Pes Anserinus    Tibial tubercle  Hamstring tendons  Infrapatellar fat pad      Optional Tests:  Patellar Mobility   []L []R Hypermobile [x]L []R Hypomobile         Girth Measurements:     Cm at  Cm above joint line   Cm at   Cm below joint line  Cm at joint line   Left Mid patella    46 cm       Right  Mid patella    44 cm           Other tests/comments:  5x sit to stand test: 25 seconds       Pain Level (0-10 scale) post treatment:  4/10    ASSESSMENT/Changes in Function:      [x]  See Plan of Care  []  See progress note/recertification  []  See Discharge Summary         Progress towards goals / Updated goals:  See POC    PLAN  []  Upgrade activities as tolerated     [x]  Continue plan of care  []  Update interventions per flow sheet       []  Discharge due to:_  []  Other:_      Dyan Dangelo, PT 4/4/2022  9:01 AM

## 2022-04-05 ENCOUNTER — OFFICE VISIT (OUTPATIENT)
Dept: ORTHOPEDIC SURGERY | Age: 67
End: 2022-04-05
Payer: OTHER MISCELLANEOUS

## 2022-04-05 VITALS
TEMPERATURE: 97.4 F | RESPIRATION RATE: 20 BRPM | SYSTOLIC BLOOD PRESSURE: 117 MMHG | BODY MASS INDEX: 32.46 KG/M2 | DIASTOLIC BLOOD PRESSURE: 74 MMHG | HEIGHT: 70 IN | HEART RATE: 75 BPM | OXYGEN SATURATION: 95 %

## 2022-04-05 DIAGNOSIS — M47.816 FACET ARTHROPATHY, LUMBAR: Primary | ICD-10-CM

## 2022-04-05 DIAGNOSIS — M51.26 DISPLACEMENT OF LUMBAR INTERVERTEBRAL DISC WITHOUT MYELOPATHY: ICD-10-CM

## 2022-04-05 DIAGNOSIS — Z96.652 S/P REVISION OF TOTAL KNEE, LEFT: ICD-10-CM

## 2022-04-05 DIAGNOSIS — M62.838 MUSCLE SPASM: ICD-10-CM

## 2022-04-05 DIAGNOSIS — M79.18 MYOFASCIAL PAIN: ICD-10-CM

## 2022-04-05 DIAGNOSIS — M48.061 SPINAL STENOSIS OF LUMBAR REGION WITHOUT NEUROGENIC CLAUDICATION: ICD-10-CM

## 2022-04-05 DIAGNOSIS — Z79.01 WARFARIN ANTICOAGULATION: ICD-10-CM

## 2022-04-05 DIAGNOSIS — T84.093S FAILED TOTAL LEFT KNEE REPLACEMENT, SEQUELA: ICD-10-CM

## 2022-04-05 DIAGNOSIS — G62.9 NEUROPATHY: ICD-10-CM

## 2022-04-05 PROCEDURE — 99214 OFFICE O/P EST MOD 30 MIN: CPT | Performed by: PHYSICAL MEDICINE & REHABILITATION

## 2022-04-05 RX ORDER — METAXALONE 800 MG/1
800 TABLET ORAL
Qty: 180 TABLET | Refills: 1 | Status: SHIPPED | OUTPATIENT
Start: 2022-04-05 | End: 2022-04-07 | Stop reason: SDUPTHER

## 2022-04-05 RX ORDER — METAXALONE 800 MG/1
TABLET ORAL
COMMUNITY
End: 2022-04-05 | Stop reason: SDUPTHER

## 2022-04-05 RX ORDER — GABAPENTIN 100 MG/1
CAPSULE ORAL
Qty: 180 CAPSULE | Refills: 1 | Status: SHIPPED | OUTPATIENT
Start: 2022-04-05 | End: 2022-10-04 | Stop reason: SDUPTHER

## 2022-04-05 RX ORDER — METHYLPREDNISOLONE 4 MG/1
TABLET ORAL
Qty: 1 DOSE PACK | Refills: 0 | Status: SHIPPED | OUTPATIENT
Start: 2022-04-05

## 2022-04-05 NOTE — PATIENT INSTRUCTIONS
Low Back Arthritis: Exercises  Introduction  Here are some examples of typical rehabilitation exercises for your condition. Start each exercise slowly. Ease off the exercise if you start to have pain. Your doctor or physical therapist will tell you when you can start these exercises and which ones will work best for you. When you are not being active, find a comfortable position for rest. Some people are comfortable on the floor or a medium-firm bed with a small pillow under their head and another under their knees. Some people prefer to lie on their side with a pillow between their knees. Don't stay in one position for too long. Take short walks (10 to 20 minutes) every 2 to 3 hours. Avoid slopes, hills, and stairs until you feel better. Walk only distances you can manage without pain, especially leg pain. How to do the exercises  Pelvic tilt    1. Lie on your back with your knees bent. 2. \"Brace\" your stomach--tighten your muscles by pulling in and imagining your belly button moving toward your spine. 3. Press your lower back into the floor. You should feel your hips and pelvis rock back. 4. Hold for 6 seconds while breathing smoothly. 5. Relax and allow your pelvis and hips to rock forward. 6. Repeat 8 to 12 times. Back stretches    1. Get down on your hands and knees on the floor. 2. Relax your head and allow it to droop. Round your back up toward the ceiling until you feel a nice stretch in your upper, middle, and lower back. Hold this stretch for as long as it feels comfortable, or about 15 to 30 seconds. 3. Return to the starting position with a flat back while you are on your hands and knees. 4. Let your back sway by pressing your stomach toward the floor. Lift your buttocks toward the ceiling. 5. Hold this position for 15 to 30 seconds. 6. Repeat 2 to 4 times. Follow-up care is a key part of your treatment and safety.  Be sure to make and go to all appointments, and call your doctor if you are having problems. It's also a good idea to know your test results and keep a list of the medicines you take. Where can you learn more? Go to http://www.[employer].com/  Enter T094 in the search box to learn more about \"Low Back Arthritis: Exercises. \"  Current as of: July 1, 2021               Content Version: 13.2  © 2006-2022 [employer]. Care instructions adapted under license by [employer] (which disclaims liability or warranty for this information). If you have questions about a medical condition or this instruction, always ask your healthcare professional. [place] [address] any warranty or liability for your use of this information. Knee Arthritis: Exercises  Introduction  Here are some examples of exercises for you to try. The exercises may be suggested for a condition or for rehabilitation. Start each exercise slowly. Ease off the exercises if you start to have pain. You will be told when to start these exercises and which ones will work best for you. How to do the exercises  Knee flexion with heel slide    1. Lie on your back with your knees bent. 2. Slide your heel back by bending your affected knee as far as you can. Then hook your other foot around your ankle to help pull your heel even farther back. 3. Hold for about 6 seconds, then rest for up to 10 seconds. 4. Repeat 8 to 12 times. 5. Switch legs and repeat steps 1 through 4, even if only one knee is sore. Quad sets    1. Sit with your affected leg straight and supported on the floor or a firm bed. Place a small, rolled-up towel under your knee. Your other leg should be bent, with that foot flat on the floor. 2. Tighten the thigh muscles of your affected leg by pressing the back of your knee down into the towel. 3. Hold for about 6 seconds, then rest for up to 10 seconds. 4. Repeat 8 to 12 times.   5. Switch legs and repeat steps 1 through 4, even if only one knee is sore. Straight-leg raises to the front    1. Lie on your back with your good knee bent so that your foot rests flat on the floor. Your affected leg should be straight. Make sure that your low back has a normal curve. You should be able to slip your hand in between the floor and the small of your back, with your palm touching the floor and your back touching the back of your hand. 2. Tighten the thigh muscles in your affected leg by pressing the back of your knee flat down to the floor. Hold your knee straight. 3. Keeping the thigh muscles tight and your leg straight, lift your affected leg up so that your heel is about 12 inches off the floor. Hold for about 6 seconds, then lower slowly. 4. Relax for up to 10 seconds between repetitions. 5. Repeat 8 to 12 times. 6. Switch legs and repeat steps 1 through 5, even if only one knee is sore. Active knee flexion    1. Lie on your stomach with your knees straight. If your kneecap is uncomfortable, roll up a washcloth and put it under your leg just above your kneecap. 2. Lift the foot of your affected leg by bending the knee so that you bring the foot up toward your buttock. If this motion hurts, try it without bending your knee quite as far. This may help you avoid any painful motion. 3. Slowly move your leg up and down. 4. Repeat 8 to 12 times. 5. Switch legs and repeat steps 1 through 4, even if only one knee is sore. Quadriceps stretch (facedown)    1. Lie flat on your stomach, and rest your face on the floor. 2. Wrap a towel or belt strap around the lower part of your affected leg. Then use the towel or belt strap to slowly pull your heel toward your buttock until you feel a stretch. 3. Hold for about 15 to 30 seconds, then relax your leg against the towel or belt strap. 4. Repeat 2 to 4 times. 5. Switch legs and repeat steps 1 through 4, even if only one knee is sore. Stationary exercise bike    1.  If you do not have a stationary exercise bike at home, you can find one to ride at your local health club or community center. 2. Adjust the height of the bike seat so that your knee is slightly bent when your leg is extended downward. If your knee hurts when the pedal reaches the top, you can raise the seat so that your knee does not bend as much. 3. Start slowly. At first, try to do 5 to 10 minutes of cycling with little to no resistance. Then increase your time and the resistance bit by bit until you can do 20 to 30 minutes without pain. 4. If you start to have pain, rest your knee until your pain gets back to the level that is normal for you. Or cycle for less time or with less effort. Follow-up care is a key part of your treatment and safety. Be sure to make and go to all appointments, and call your doctor if you are having problems. It's also a good idea to know your test results and keep a list of the medicines you take. Where can you learn more? Go to http://www.[employer].com/  Enter C159 in the search box to learn more about \"Knee Arthritis: Exercises. \"  Current as of: July 1, 2021               Content Version: 13.2  © 2006-2022 [employer]. Care instructions adapted under license by [employer] (which disclaims liability or warranty for this information). If you have questions about a medical condition or this instruction, always ask your healthcare professional. [place] [address] any warranty or liability for your use of this information.

## 2022-04-05 NOTE — PROGRESS NOTES
[facility]  [address] [place], [address]  Phone: [phone]  Fax: [phone]    Pt's YOB: 1955    ASSESSMENT   Diagnoses and all orders for this visit:    1. Facet arthropathy, lumbar  -     methylPREDNISolone (MEDROL DOSEPACK) 4 mg tablet; Per dose pack instructions    2. Spinal stenosis of lumbar region without neurogenic claudication  -     gabapentin (NEURONTIN) 100 mg capsule; Take 2 capsules at bedtime as directed  Indications: neuropathic pain    3. Displacement of lumbar intervertebral disc without myelopathy    4. Neuropathy  -     gabapentin (NEURONTIN) 100 mg capsule; Take 2 capsules at bedtime as directed  Indications: neuropathic pain    5. Myofascial pain    6. Muscle spasm    7. Warfarin anticoagulation    8. S/P revision of total knee, left    9. Failed total left knee replacement, sequela         IMPRESSION AND PLAN:  Vinnie Galvez is a 74 y.o. male with history of lumbar pain. He complains of  continued mild lumbar pain, which he rates as 2-3/10, and \"muscle knots\" in the left lateral thigh. He notes he has undergone a revision of his left knee replacement sine his last office visit. Pt is taking Neurontin 100 mg 2 caps QHS with relief and Skelaxin rarely as needed. 1) Pt was given information on lumbar and knee arthritis exercises. 2) Pt received refills of Neurontin 100 for neuropathic symptoms and Skelaxin 800 mg.for muscle spasm -- this was sent with a different quantity at pt's request due to the cost which was not covered by   3) Pt was prescribed a Medrol Dosepak to take in case of acute inflammatory pain. 4) I recommended the pt keep his knee extended to ensure good range of motion in the joint after his surgery. 5) Pt is not a candidate for NSAID's due to chronic anticoagulation with Coumadin. 6) Mr. Ava Mchugh has a reminder for a \"due or due soon\" health maintenance.  I have asked that he contact his primary care provider, Alfredito Chao NP, for follow-up on this health maintenance. 7)  demonstrated consistency with prescribing. Follow-up and Dispositions    · Return in about 3 months (around 7/5/2022) for Medication follow up. HISTORY OF PRESENT ILLNESS:  Sierra Dia is a 74 y.o. male with history of lumbar pain and presents to the office today for medication follow up. Pt complains of continued mild lumbar pain, which he rates as 2-3/10, and \"muscle knots\" in the left lateral thigh. Pt is taking Neurontin 100 mg 2 caps QHS with relief and Skelaxin 800 mg rarely as needed. He notes no relief with taking Flexeril and no sedation with Skelaxin. Pt states that he has undergone a revision of his left knee replacement due to detachment of the hardware and will begin physical therapy in a few weeks. He further notes edema in the left lower extremity after his surgery, which was relieved with a 5-day course of a diuretic. Pt at this time desires to continue with current care. Of note, pt presents to today's office visit in a wheelchair. Pain Scale: 3/10    PCP: Alfredito Chao NP     Past Medical History:   Diagnosis Date    Arthritis     Chronic pain     knee and shoulder    DVT (deep venous thrombosis) ([address] 1992    post sx.   R LEG    DVT (deep venous thrombosis) ([facility]) 2000    POST BACK SX. 2- LEFT LEG    GERD (gastroesophageal reflux disease)     Headache(784.0)     everyday    High cholesterol     Hypertension     Prostate cancer ([address] 2018    Pure hypercholesterolemia     Spinal stenosis     Thromboembolus ([address]         Social History     Socioeconomic History    Marital status:      Spouse name: Not on file    Number of children: Not on file    Years of education: Not on file    Highest education level: Not on file   Occupational History    Not on file   Tobacco Use    Smoking status: Never Smoker    Smokeless tobacco: Never Used Vaping Use    Vaping Use: Never used   Substance and Sexual Activity    Alcohol use: No    Drug use: Never    Sexual activity: Not Currently   Other Topics Concern     Service Not Asked    Blood Transfusions Not Asked    Caffeine Concern Not Asked    Occupational Exposure Not Asked    Hobby Hazards Not Asked    Sleep Concern Not Asked    Stress Concern Not Asked    Weight Concern Not Asked    Special Diet Not Asked    Back Care Not Asked    Exercise Not Asked    Bike Helmet Not Asked   [address] Not Asked    Self-Exams Not Asked   Social History Narrative    Not on file     Social Determinants of Health     Financial Resource Strain:     Difficulty of Paying Living Expenses: Not on file   Food Insecurity:     Worried About Running Out of Food in the Last Year: Not on file    Marley of Food in the Last Year: Not on file   Transportation Needs:     Lack of Transportation (Medical): Not on file    Lack of Transportation (Non-Medical):  Not on file   Physical Activity:     Days of Exercise per Week: Not on file    Minutes of Exercise per Session: Not on file   Stress:     Feeling of Stress : Not on file   Social Connections:     Frequency of Communication with Friends and Family: Not on file    Frequency of Social Gatherings with Friends and Family: Not on file    Attends Mormon Services: Not on file    Active Member of [address] [place] [address] [employer] or Organizations: Not on file    Attends Club or Organization Meetings: Not on file    Marital Status: Not on file   Intimate Partner Violence:     Fear of Current or Ex-Partner: Not on file    Emotionally Abused: Not on file    Physically Abused: Not on file    Sexually Abused: Not on file   Housing Stability:     Unable to Pay for Housing in the Last Year: Not on file    Number of [place] in the Last Year: Not on file    Unstable Housing in the Last Year: Not on file       Current Outpatient Medications   Medication Sig Dispense Refill  gabapentin (NEURONTIN) 100 mg capsule Take 2 capsules at bedtime as directed  Indications: neuropathic pain 180 Capsule 1    methylPREDNISolone (MEDROL DOSEPACK) 4 mg tablet Per dose pack instructions 1 Dose Pack 0    docusate sodium (Colace) 100 mg capsule Take 1 Capsule by mouth two (2) times a day for 90 days. 60 Capsule 2    clotrimazole-betamethasone (LOTRISONE) 1-0.05 % lotion Apply  to affected area two (2) times a day. 30 mL 0    allopurinoL (ZYLOPRIM) 100 mg tablet Take 1 Tablet by mouth two (2) times a day. 60 Tablet 0    L gasseri/B bifidum/B longum ([employer] PO) Take 1 Tablet by mouth nightly.  lansoprazole (PREVACID) 30 mg capsule Take 30 mg by mouth daily.  warfarin (COUMADIN) 5 mg tablet TAKE 2 AND 1/2 TABLETS BY MOUTH DAILY OR AS DIRECTED (Patient taking differently: Take  by mouth daily. Patient takes 2  TABLETS BY MOUTH Daily or as directed) 75 Tab 0    lisinopril-hydroCHLOROthiazide (PRINZIDE, ZESTORETIC) 20-12.5 mg per tablet TAKE 1 TABLET BY MOUTH DAILY (Patient taking differently: Take 1 Tab by mouth daily. TAKE 1 TABLET BY MOUTH DAILY) 90 Tab 1    labetalol (NORMODYNE) 100 mg tablet TAKE 1 TABLET BY MOUTH TWICE DAILY. STOP VERAPAMIL (Patient taking differently: Take  by mouth two (2) times a day.) 180 Tab 0    butalbital-acetaminophen-caff (FIORICET) -40 mg per capsule 2 cap three times per day as needed for headache (Patient taking differently: Take 1 Capsule by mouth daily. 2 cap three times per day as needed for headache) 180 Cap 1    ALPRAZolam (XANAX) 0.5 mg tablet Take one half(1/2) tab to one(1) tab by mouth at bedtime as needed for sleep (Patient taking differently: Take  by mouth nightly as needed.  Take one half(1/2) tab to one(1) tab by mouth at bedtime as needed for sleep) 30 Tab 0    montelukast (SINGULAIR) 10 mg tablet TAKE 1 TABLET BY MOUTH EVERY DAY 90 Tab 0    acetaminophen (TYLENOL) 500 mg tablet Take 1,000 mg by mouth every six (6) hours as needed for Pain.  metaxalone (Skelaxin) 800 mg tablet Take 1 Tablet by mouth three (3) times daily. Take as needed  Indications: muscle spasm 90 Tablet 3    HYDROcodone-acetaminophen (Norco) 7.5-325 mg per tablet Take 1-2 Tablets by mouth every eight (8) hours as needed for Pain for up to 7 days. Max Daily Amount: 6 Tablets. 42 Tablet 0    ondansetron hcl (Zofran) 4 mg tablet Take 1 Tablet by mouth every eight (8) hours as needed for Nausea. 12 Tablet 0       Allergies   Allergen Reactions    Amoxicillin Itching    Augmentin [Amoxicillin-Pot Clavulanate] Itching    Chlorhexidine Unknown (comments)    Chlorhexidine Towelette Itching    Hibiclens [Chlorhexidine Gluconate] Itching    Milk Containing Products Diarrhea    Nsaids (Non-Steroidal Anti-Inflammatory Drug) Other (comments)     On blood thinner, contraindicated.  Penicillins Rash    Requip [Ropinirole] Nausea and Vomiting    Simvastatin Other (comments)         REVIEW OF SYSTEMS    Constitutional: Negative for fever, chills, or weight change. Respiratory: Negative for cough or shortness of breath. Cardiovascular: Negative for chest pain or palpitations. Positive for swelling in the left ankle and popliteal region. Gastrointestinal: Negative for acid reflux, change in bowel habits, or constipation. Genitourinary: Negative for dysuria and flank pain. Musculoskeletal: Positive for lumbar and left thigh pain. Skin: Negative for rash. Neurological: Negative for headaches, dizziness, or numbness. Endo/Heme/Allergies: Negative for increased bruising. Psychiatric/Behavioral: Negative for difficulty with sleep. As per HPI    PHYSICAL EXAMINATION  Visit Vitals  /74 (BP 1 Location: Right arm, BP Patient Position: Sitting)   Pulse 75   Temp 97.4 °F (36.3 °C) (Temporal)   Resp 20   Ht 5' 10\" (1.778 m)   SpO2 95%   BMI 32.46 kg/m²       Constitutional: Awake, alert, and in no acute distress.   Neurological: Sensation to light touch is intact. Skin: warm, dry, and intact. Musculoskeletal:  No pain with extension, axial loading, or forward flexion. No pain with internal or external rotation of his hips. Negative straight leg raise bilaterally. Hip Flex  Quads Hamstrings Ankle DF EHL Ankle PF   Right +4/5 +4/5 +4/5 +4/5 +4/5 +4/5   Left +4/5 +4/5 +4/5 -3/5 +4/5 -4/5     IMAGING:    Left knee MRI from 03/09/2022 was personally reviewed with the patient and demonstrated:       FINDINGS: Frontal and lateral views of the left knee obtained. Interval total  left knee arthroplasty revision with overlying soft tissue edema and emphysema,  and skin staples. No obvious acute hardware competition. No acute osseous  findings.     IMPRESSION     Interval total left knee arthroplasty revision with expected postsurgical  changes. Lumbar spine MRI from 01/24/2018 was personally reviewed with the patient and demonstrated:  Results from [facility] on 01/24/18   MRI LUMB SPINE W WO CONT     Narrative MR lumbar spine with and without contrast    CPT CODE: 87370    HISTORY: Chronic and worsening low back pain with left lower extremity pain. Increasing weakness. Remote history of surgeries. No recent injury. COMPARISON: MRI January 2013. TECHNIQUE: Lumbar spine scanned with axial and sagittal T1W scans, axial and  sagittal T2W scans, and with post gadolinium axial and sagittal T1W scans. Contrast used: 10 cc Gadavist.    FINDINGS:     Prior laminotomies on the left at L4-5 and bilaterally at L5-S1. No fracture,  bone destruction, or fluid collection. Intact lordosis. Normal vertebral body heights. Small less than 5 mm low signal  focus within anterior L4, developed since 2013. No similar focus elsewhere. No  aggressive features. Otherwise generally fatty marrow signal with some chronic  fatty endplate changes straddling L5-S1. Moderate to severe disc space narrowing  and disc desiccation of that level.  Mild to moderate narrowing and desiccation  of L3-4 and L4-5. Conus at T12-L1. Axial imaging correlation:    T12-L1:  Patent canal and foramina. L1-L2: Patent canal and foramina. L2-L3: Patent canal and foramina. L3-L4: Broad-based disc osteophyte complex. Bilateral facet arthropathy with  ligamentum flavum thickening and buckling. Moderate concentric spinal stenosis. Particular narrowing of lateral recesses with transient distortion of the  crossing L4 nerves. Axial T2 image 20. Patent foramina.  Progression of  degenerative findings. L4-L5: Postoperative level. Mild broad-based disc osteophyte complex with a  small amount of enhancing scar tissue. Hypertrophic facet arthropathy. Moderate  spinal stenosis. Narrowing of the lateral recesses left more than right. Transient distortion of the crossing nerves particularly left L5. Axial T2 image  13. Mild foraminal stenoses.  Unchanged. L5-S1: Postoperative level. Broad-based disc osteophyte complex with enhancing  scar tissue centrally. Hypertrophic facet arthropathy. No significant spinal  stenosis. Moderately severe bilateral foraminal stenoses. Unchanged. Incidental imaging of regional soft tissues unremarkable.                  Impression IMPRESSION:    1. Some progression of degenerative disc and facet disease at L3-4, with  moderate spinal stenosis and potentially impingement of the crossing L4 nerves,  left more than right. 2. Stable postsurgical and degenerative findings at L4-5 and L5-S1.          Left shoulder MRI from 11/12/2021 was personally reviewed with the patient and demonstrated:     FINDINGS:     Rotator Cuff: Full-thickness tear of the supraspinatus with fluid-filled tendon defect measuring 3.7 cm in transverse dimension. Moderate infraspinatus tendinosis. Mild subscapularis tendinosis. Teres minor appears intact. Mild fatty change of the supraspinatus and infraspinatus.      Subacromial Outlet:  Moderate degenerative changes of the acromioclavicular joint. Moderate narrowing. Acromioclavicular joint effusion. Type 2 acromion. No os acromiale.     Glenohumeral joint: No evidence of dislocation.     Long Head of the Biceps Tendon: Full-thickness tear of the intra-articular portion with 8 cm of proximal tendon retraction. Moderate distension of the biceps tendon sheath.      Glenoid Labrum: Maceration of the superior labrum. No paralabral cyst.      Osseous Structures: No bone contusion or Sachs-Hill deformity.     Soft Tissues: Moderate joint effusion with synovitis. Moderate distension of the subacromial subdeltoid bursa. Distension of the subcoracoid bursa.      IMPRESSION:      1. Full-thickness tear of the supraspinatus with fluid-filled tendon defect measuring 3.7 cm. Tendon fibers are retracted to the level of the acromioclavicular joint. Mild fatty change of the supraspinatus and infraspinatus. 2. Moderate infraspinatus and mild subscapularis tendinosis. 3. Moderate narrowing of the subacromial outlet. 4. Fill-thickness retracted tear of the intra-articular biceps tendon. Tendon fibers are retracted past the bicipital groove, at least 8 cm from the biceps anchor insertion. 5. Moderate joint effusion with synovitis. 6. Moderate distension of the subacromial subdeltoid bursa. Written by Evon Console, as dictated by Jackelin Iqbal MD.  I, Dr. Jackelin Iqbal confirm that all documentation is accurate.

## 2022-04-06 ENCOUNTER — TELEPHONE (OUTPATIENT)
Dept: ORTHOPEDIC SURGERY | Age: 67
End: 2022-04-06

## 2022-04-06 NOTE — TELEPHONE ENCOUNTER
Patient reports [facility] will not cover rx metaxalone (Skelaxin) 800 mg tablet - prescription will need to be changed from 180 tabs to 90 tabs and called in to [facility] pharmacy on file. Patient cannot afford to pay for script as written for 180 tabs.

## 2022-04-07 ENCOUNTER — OFFICE VISIT (OUTPATIENT)
Dept: ORTHOPEDIC SURGERY | Age: 67
End: 2022-04-07
Payer: MEDICARE

## 2022-04-07 VITALS
OXYGEN SATURATION: 98 % | TEMPERATURE: 91.4 F | HEART RATE: 98 BPM | WEIGHT: 229 LBS | HEIGHT: 70 IN | BODY MASS INDEX: 32.78 KG/M2

## 2022-04-07 DIAGNOSIS — M79.18 MYOFASCIAL PAIN: ICD-10-CM

## 2022-04-07 DIAGNOSIS — Z96.652 S/P REVISION OF TOTAL KNEE, LEFT: Primary | ICD-10-CM

## 2022-04-07 DIAGNOSIS — M25.562 LEFT KNEE PAIN, UNSPECIFIED CHRONICITY: ICD-10-CM

## 2022-04-07 DIAGNOSIS — M62.838 MUSCLE SPASM: ICD-10-CM

## 2022-04-07 PROCEDURE — 99024 POSTOP FOLLOW-UP VISIT: CPT | Performed by: PHYSICIAN ASSISTANT

## 2022-04-07 RX ORDER — HYDROCODONE BITARTRATE AND ACETAMINOPHEN 7.5; 325 MG/1; MG/1
1-2 TABLET ORAL
Qty: 42 TABLET | Refills: 0 | Status: CANCELLED | OUTPATIENT
Start: 2022-04-07 | End: 2022-04-14

## 2022-04-07 RX ORDER — METAXALONE 800 MG/1
800 TABLET ORAL 3 TIMES DAILY
Qty: 90 TABLET | Refills: 3 | Status: SHIPPED | OUTPATIENT
Start: 2022-04-07

## 2022-04-07 RX ORDER — HYDROCODONE BITARTRATE AND ACETAMINOPHEN 7.5; 325 MG/1; MG/1
1-2 TABLET ORAL
Qty: 42 TABLET | Refills: 0 | Status: SHIPPED | OUTPATIENT
Start: 2022-04-07 | End: 2022-04-14

## 2022-04-07 NOTE — TELEPHONE ENCOUNTER
The pt was contacted and notified that this was done and sent to his pharmacy. He verbalized understanding and has no questions or concerns at this time. The pt was identified using 2 pt identifiers.

## 2022-04-07 NOTE — TELEPHONE ENCOUNTER
Graciela Roper MD  o Nurses 7 minutes ago (12:21 PM)     Fernando Johnson sent to [employer]    (#90 / 3 RF)    Message text

## 2022-04-07 NOTE — PROGRESS NOTES
[address] [facility], [address] [facility]  [phone]           Patient: Candido Parish                MRN: [id number]       SSN: [id number]  YOB: 1955        AGE: 74 y.o. SEX: male  Body mass index is 32.86 kg/m². PCP: Madeline Watts NP  04/07/22      This office note has been dictated. REVIEW OF SYSTEMS:  Constitutional: Negative for fever, chills, weight loss and malaise/fatigue. HENT: Negative. Eyes: Negative. Respiratory: Negative. Cardiovascular: Negative. Gastrointestinal: No bowel incontinence or constipation. Genitourinary: No bladder incontinence or saddle anesthesia. Skin: Negative. Neurological: Negative. Endo/Heme/Allergies: Negative. Psychiatric/Behavioral: Negative. Musculoskeletal: As per HPI above. Past Medical History:   Diagnosis Date    Arthritis     Chronic pain     knee and shoulder    DVT (deep venous thrombosis) ([facility] 1992    post sx. R LEG    DVT (deep venous thrombosis) ([facility]) 2000    POST BACK SX. 2- LEFT LEG    GERD (gastroesophageal reflux disease)     Headache(784.0)     everyday    High cholesterol     Hypertension     Prostate cancer ([facility] [address] 2018    Pure hypercholesterolemia     Spinal stenosis     Thromboembolus ([facility])          Current Outpatient Medications:     metaxalone (Skelaxin) 800 mg tablet, Take 1 Tablet by mouth three (3) times daily. Take as needed  Indications: muscle spasm, Disp: 90 Tablet, Rfl: 3    gabapentin (NEURONTIN) 100 mg capsule, Take 2 capsules at bedtime as directed  Indications: neuropathic pain, Disp: 180 Capsule, Rfl: 1    methylPREDNISolone (MEDROL DOSEPACK) 4 mg tablet, Per dose pack instructions, Disp: 1 Dose Pack, Rfl: 0    docusate sodium (Colace) 100 mg capsule, Take 1 Capsule by mouth two (2) times a day for 90 days. , Disp: 60 Capsule, Rfl: 2    ondansetron hcl (Zofran) 4 mg tablet, Take 1 Tablet by mouth every eight (8) hours as needed for Nausea., Disp: 12 Tablet, Rfl: 0    clotrimazole-betamethasone (LOTRISONE) 1-0.05 % lotion, Apply  to affected area two (2) times a day., Disp: 30 mL, Rfl: 0    allopurinoL (ZYLOPRIM) 100 mg tablet, Take 1 Tablet by mouth two (2) times a day., Disp: 60 Tablet, Rfl: 0    L gasseri/B bifidum/B longum ([facility] [employer] [facility]), Take 1 Tablet by mouth nightly., Disp: , Rfl:     lansoprazole (PREVACID) 30 mg capsule, Take 30 mg by mouth daily. , Disp: , Rfl:     warfarin (COUMADIN) 5 mg tablet, TAKE 2 AND 1/2 TABLETS BY MOUTH DAILY OR AS DIRECTED (Patient taking differently: Take  by mouth daily. Patient takes 2  TABLETS BY MOUTH Daily or as directed), Disp: 75 Tab, Rfl: 0    lisinopril-hydroCHLOROthiazide (PRINZIDE, ZESTORETIC) 20-12.5 mg per tablet, TAKE 1 TABLET BY MOUTH DAILY (Patient taking differently: Take 1 Tab by mouth daily. TAKE 1 TABLET BY MOUTH DAILY), Disp: 90 Tab, Rfl: 1    labetalol (NORMODYNE) 100 mg tablet, TAKE 1 TABLET BY MOUTH TWICE DAILY. STOP VERAPAMIL (Patient taking differently: Take  by mouth two (2) times a day.), Disp: 180 Tab, Rfl: 0    butalbital-acetaminophen-caff (FIORICET) -40 mg per capsule, 2 cap three times per day as needed for headache (Patient taking differently: Take 1 Capsule by mouth daily. 2 cap three times per day as needed for headache), Disp: 180 Cap, Rfl: 1    ALPRAZolam (XANAX) 0.5 mg tablet, Take one half(1/2) tab to one(1) tab by mouth at bedtime as needed for sleep (Patient taking differently: Take  by mouth nightly as needed. Take one half(1/2) tab to one(1) tab by mouth at bedtime as needed for sleep), Disp: 30 Tab, Rfl: 0    montelukast (SINGULAIR) 10 mg tablet, TAKE 1 TABLET BY MOUTH EVERY DAY, Disp: 90 Tab, Rfl: 0    acetaminophen (TYLENOL) 500 mg tablet, Take 1,000 mg by mouth every six (6) hours as needed for Pain., Disp: , Rfl:     furosemide (LASIX) 20 mg tablet, Take 1 Tablet by mouth daily.  (Patient not taking: Reported on 4/5/2022), Disp: 5 Tablet, Rfl: 0    Allergies   Allergen Reactions    Amoxicillin Itching    Augmentin [Amoxicillin-Pot Clavulanate] Itching    Chlorhexidine Unknown (comments)    Chlorhexidine Towelette Itching    Hibiclens [Chlorhexidine Gluconate] Itching    Milk Containing Products Diarrhea    Nsaids (Non-Steroidal Anti-Inflammatory Drug) Other (comments)     On blood thinner, contraindicated.  Penicillins Rash    Requip [Ropinirole] Nausea and Vomiting    Simvastatin Other (comments)       Social History     Socioeconomic History    Marital status:      Spouse name: Not on file    Number of children: Not on file    Years of education: Not on file    Highest education level: Not on file   Occupational History    Not on file   Tobacco Use    Smoking status: Never Smoker    Smokeless tobacco: Never Used   Vaping Use    Vaping Use: Never used   Substance and Sexual Activity    Alcohol use: No    Drug use: Never    Sexual activity: Not Currently   Other Topics Concern     Service Not Asked    Blood Transfusions Not Asked    Caffeine Concern Not Asked    Occupational Exposure Not Asked    Hobby Hazards Not Asked    Sleep Concern Not Asked    Stress Concern Not Asked    Weight Concern Not Asked    Special Diet Not Asked    Back Care Not Asked    Exercise Not Asked    Bike Helmet Not Asked    Seat Belt Not Asked    Self-Exams Not Asked   Social History Narrative    Not on file     Social Determinants of Health     Financial Resource Strain:     Difficulty of Paying Living Expenses: Not on file   Food Insecurity:     Worried About Running Out of Food in the Last Year: Not on file    Marley of Food in the Last Year: Not on file   Transportation Needs:     Lack of Transportation (Medical): Not on file    Lack of Transportation (Non-Medical):  Not on file   Physical Activity:     Days of Exercise per Week: Not on file    Minutes of Exercise per Session: Not on file   Stress:     Feeling of Stress : Not on file   Social Connections:     Frequency of Communication with Friends and Family: Not on file    Frequency of Social Gatherings with Friends and Family: Not on file    Attends Faith Services: Not on file    Active Member of Clubs or Organizations: Not on file    Attends Club or Organization Meetings: Not on file    Marital Status: Not on file   Intimate Partner Violence:     Fear of Current or Ex-Partner: Not on file    Emotionally Abused: Not on file    Physically Abused: Not on file    Sexually Abused: Not on file   Housing Stability:     Unable to Pay for Housing in the Last Year: Not on file    Number of [place] in the Last Year: Not on file    Unstable Housing in the Last Year: Not on file       Past Surgical History:   Procedure Laterality Date    HX HEENT Right 09/11/2018    eye surgery, macular     HX KNEE REPLACEMENT Left 2018    HX LUMBAR LAMINECTOMY  1992    HX LUMBAR LAMINECTOMY  2000    HX ORTHOPAEDIC  06-25-12    Right foot with excision of bursa and adipose tissue from fifth metatarsal base by Dr. Marya Alexander Left 03/09/2022    left knee revision    HX PROSTATECTOMY  11/2018    HX ROTATOR CUFF REPAIR Right 01/28/2019    by Dr. Ruslan Curran ARTHROSCOPY Left 12/2020    WORK RELATED INJURY               Patient seen evaluated today for his revision left knee replacement. He is now 4 weeks as per surgery doing well. He states the swelling is coming down. The pain is decreasing. He has been working on home exercise program.  He begins outpatient physical therapy on Tuesday. Patient denies recent fevers, chills, chest pain, SOB, or injuries. No recent systemic changes noted. A 12-point review of systems is performed today. Pertinent positives are noted. All other systems reviewed and otherwise are negative.     Physical exam: General: Alert and oriented x3, nad.  well-developed, well nourished. normal affect, AF. NC/AT, EOMI, neck supple, trachea midline, no JVD present. Breathing is non-labored. Examination of the left knee reveals skin intact. There is no erythema ecchymosis noted. There are no signs of infection or cellulitis present. Range of motion is full extension with about a 2 or 3 degree extensor lag. No defects noted extensor mechanism. He flexes to 95. Negative calf tenderness. Negative Homans. No signs of DVT present. Mild edema present. Assessment: Status post revision left knee replacement    Plan: At this point, the patient is doing well. He is to continue with physical therapy as well as a home exercise program.  He will continue with ice therapy. We will see him back in the office in 3 weeks time for evaluation, range of motion check and x-rays.         JR Willie GATES, ESPERANZA, ATC

## 2022-04-12 ENCOUNTER — HOSPITAL ENCOUNTER (OUTPATIENT)
Dept: PHYSICAL THERAPY | Age: 67
Discharge: HOME | End: 2022-04-12
Payer: MEDICARE

## 2022-04-12 PROCEDURE — 97016 VASOPNEUMATIC DEVICE THERAPY: CPT

## 2022-04-12 PROCEDURE — 97110 THERAPEUTIC EXERCISES: CPT

## 2022-04-12 PROCEDURE — 97032 APPL MODALITY 1+ESTIM EA 15: CPT

## 2022-04-12 NOTE — PROGRESS NOTES
PT DAILY TREATMENT NOTE     Patient Name: Maria Victoria Ross  BLZJ:  : 1955  [x]  Patient  Verified  Payor: [place] MEDICARE / Plan: [place] MEDICARE PART A & B / Product Type: Medicare /    In time: 1:35  Out time: 2:38  Total Treatment Time (min): 63  Visit #: 2 of     Medicare/BCBS Only   Total Timed Codes (min):  48 1:1 Treatment Time:  48       Treatment Area: Pain in left knee [M25.562]    SUBJECTIVE  Pain Level (0-10 scale): 410  Any medication changes, allergies to medications, adverse drug reactions, diagnosis change, or new procedure performed?: [x] No    [] Yes (see summary sheet for update)  Subjective functional status/changes:   [] No changes reported  Pt reports he is able to walk around his house and go up and down stairs coming into the house with little difficulty. He is also able to get in and out of car with little difficulty. He states he has been completing his exercises at home. He reports swelling behind left knee. OBJECTIVE    Modality rationale: decrease edema, decrease inflammation, decrease pain, increase tissue extensibility and increase muscle contraction/control to improve the patients ability to bend knee without pain.    Min Type Additional Details    [] Estim:  []Unatt       []IFC  []Premod                        []Other:  []w/ice   []w/heat  Position:  Location:   10 [x] Estim: [x]Att    []TENS instruct  [x]NMES                    [x]Other: 10:30 []w/US   []w/ice   []w/heat  Position: seated at LAQ machine 60 degree angle  Location: left quad    []  Traction: [] Cervical       []Lumbar                       [] Prone          []Supine                       []Intermittent   []Continuous Lbs:  [] before manual  [] after manual    []  Ultrasound: []Continuous   [] Pulsed                           []1MHz   []3MHz W/cm2:  Location:    []  Iontophoresis with dexamethasone         Location: [] Take home patch   [] In clinic    []  Ice     []  heat  []  Ice massage  [] Laser   []  Anodyne Position:  Location:    []  Laser with stim  []  Other:  Position:  Location:   15 [x]  Vasopneumatic Device    []  Right     [x]  Left  Pre-treatment girth: 18.5\"  Post-treatment girth:17.75\"  Measured at (location): mid patella Pressure:       [] lo [x] med [] hi   Temperature: [x] lo [] med [] hi   [x] Skin assessment post-treatment:  [x]intact []redness- no adverse reaction    []redness - adverse reaction:         38 min Therapeutic Exercise:  [x] See flow sheet :   Rationale: increase ROM, increase strength, improve coordination, improve balance and increase proprioception to improve the patients ability to ambulate for longer distances. With   [] TE   [] TA   [] neuro   [] other: Patient Education: [x] Review HEP    [] Progressed/Changed HEP based on:   [] positioning   [] body mechanics   [] transfers   [] heat/ice application    [] other:      Other Objective/Functional Measures:     Pt presented to therapy with SPC, decreased left heel strike and decreased knee flexion during swing phase      Pt has significant visible swelling behind left knee     Pt was able to perform sit to stands without UE     Responded well to NMES on leg curl machine set to 60 degrees    Educated on asking MD about wearing compression stocking to left LE to aide in swelling control throughout the day and to work on elevation to aide In swelling at home      Pain Level (0-10 scale) post treatment: 3/10    ASSESSMENT/Changes in Function:     Pt struggles to bend left knee due to swelling on posterior side. He uses a slow gait with SPC. He is showing slow progress with HEP compliance and performance sit to stands without UE. He tolerated NMES to the left knee well but display quad fatigue with leg quivering. He would benefit from continued quad and HS strengthening to increase left knee stability to allow him to ambulate with greater ease.     Patient will continue to benefit from skilled PT services to modify and progress therapeutic interventions, address functional mobility deficits, address ROM deficits, address strength deficits, analyze and address soft tissue restrictions, analyze and cue movement patterns, analyze and modify body mechanics/ergonomics, assess and modify postural abnormalities and instruct in home and community integration to attain remaining goals. [x]  See Plan of Care  []  See progress note/recertification  []  See Discharge Summary         Progress towards goals / Updated goals:    Short Term Goals: To be accomplished in 1 weeks:  1. Patient will demonstrate compliance with HEP in order to improve left knee mobility for increased ease of ADLs  Met 4/12/22     Long Term Goals: To be accomplished in 4 weeks:  1. Patient will improve FOTO score by 26 points in order to demonstrate a significant improvement in function. 2. Patient will improve left knee AROM 0-100 degrees in order to increase ease of ambulation. 3. Patient will improve left knee MMT to 4/5 in order to increase ease of stair negotiation. 4. Patient will perform a sit to stand transfer without UE support in order to increase ease of transfers at home. PLAN  [x]  Upgrade activities as tolerated     [x]  Continue plan of care  []  Update interventions per flow sheet       []  Discharge due to:_  []  Other:_      MATTHIAS Lion 4/12/2022  5:19 PM    I was present during the entire treatment, directing and participating in the treatment.    CECELIA Reynolds   Future Appointments   Date Time Provider Dex [place]   4/15/2022  1:30 PM Vincenzo Childs [facility]   4/19/2022  1:00 PM Roverto Brush   4/19/2022  3:45 PM Arlene Stallworth, PT [id number]   4/22/2022  1:30 PM Jacky Cranker, PTA [facility]   4/26/2022  1:30 PM Jacky Cranker, PTA [facility]   4/28/2022  2:15 PM Mariano High, PT [facility]   4/29/2022  1:00 PM Aneta Han PA-C [facility] AMB   5/3/2022  1:30 PM Lexie Torres PTA [facility]   5/5/2022  1:30 PM Lexie Torres PTA [facility]   7/5/2022  1:45 PM George Bateman MD [facility] BS AMB   10/18/2022  1:00 PM [place]   10/25/2022  1:00 PM Given, Allen Huizar MD [address]

## 2022-04-15 ENCOUNTER — TELEPHONE (OUTPATIENT)
Dept: ORTHOPEDIC SURGERY | Age: 67
End: 2022-04-15

## 2022-04-15 ENCOUNTER — HOSPITAL ENCOUNTER (OUTPATIENT)
Dept: PHYSICAL THERAPY | Age: 67
Discharge: HOME | End: 2022-04-15
Payer: MEDICARE

## 2022-04-15 NOTE — TELEPHONE ENCOUNTER
Patient and wife called to inform the same. They are requesting guidance on what to do next regarding this redness. Please advise.      [address] [place] [address] Rd  Patient [phone]

## 2022-04-15 NOTE — TELEPHONE ENCOUNTER
Denisa Ortega ([employer]) called to report patient had red streaking around incision, warm to touch, and bubbling of the skin. States patient was advised to contact office as well and she wanted to initial notify.     Phn #: [phone]

## 2022-04-15 NOTE — TELEPHONE ENCOUNTER
Discussed with Dr. Larose Pallas. Per Dr. Larose Pallas, patient is to see CARA De Souza on Monday at the [facility] location. Patient instructed that if he begins to run a temperature, chills etc to present to ED immediately. Patient verbalized understanding. Patient was transferred to the [facility] to schedule an appointment.

## 2022-04-15 NOTE — PROGRESS NOTES
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 He states he was on 2 weeks of antibiotics right after surgery due to history of cellulitis but he has been off of them for a little bit. OBJECTIVE            With   [] TE   [] TA   [] neuro   [] other: Patient Education: [x] Review HEP    [] Progressed/Changed HEP based on:   [] positioning   [] body mechanics   [] transfers   [] heat/ice application    [] other:      Other Objective/Functional Measures:   Pt left knee hot to touch with streaks of red and raised bumps surrounding surgical scar  Increased swelling to anterior knee and popliteal region  Called MD office to notify of changes  Also had PT Vicky Russell come to look at incision and she was agreeable to hold PT and see MD to rule out infection    ASSESSMENT/Changes in Function:     Pt was not charged or seen for visit due to cutaneous changes (red streaking, increased erythema, swelling and small blisters) on left knee. Pt to be placed on hold and follow up with MD for differential diagnosis to ensure safe continuation of PT.       [x]  See Plan of Care  []  See progress note/recertification  []  See Discharge Summary         Progress towards goals / Updated goals:    Short Term Goals: To be accomplished in 1 weeks:  1. Patient will demonstrate compliance with HEP in order to improve left knee mobility for increased ease of ADLs  Met 4/12/22     Long Term Goals: To be accomplished in 4 weeks:  1. Patient will improve FOTO score by 26 points in order to demonstrate a significant improvement in function. 2. Patient will improve left knee AROM 0-100 degrees in order to increase ease of ambulation. 3. Patient will improve left knee MMT to 4/5 in order to increase ease of stair negotiation. 4. Patient will perform a sit to stand transfer without UE support in order to increase ease of transfers at home. PLAN  [x]  Upgrade activities as tolerated     [x]  Continue plan of care  []  Update interventions per flow sheet       []  Discharge due to:_  []  Other:_      MATTHIAS Lion 4/15/2022  5:19 PM    I was present during the entire treatment, directing and participating in the treatment.    CECELIA Reynolds   Future Appointments   Date Time Provider Dex Hatfield   4/15/2022  1:30 PM Jacky Cranker, PTA [facility]   4/19/2022  1:00 PM Roverto Brush   4/19/2022  3:45 PM Arlene Stallworth, PT [facility]   4/22/2022  1:30 PM Jacky Cranker, PTA [facility]   4/26/2022  1:30 PM Jacky Cranker, PTA [facility]   4/28/2022  2:15 PM Mariano High, PT [facility]   4/29/2022  1:00 PM ESPERANZA Velazquez BS AMB   5/3/2022  1:30 PM Jacky Cranker, PTA [facility]   5/5/2022  1:30 PM Jacky Cranker, PTA [facility]   7/5/2022  1:45 PM Danette Moctezuma MD [facility] BS AMB   10/18/2022  1:00 PM [facility] NURSE [place]   10/25/2022  1:00 PM Given, Sandra Gusman MD [address] None

## 2022-04-18 NOTE — TELEPHONE ENCOUNTER
Patient got a call from our office this morning to inform PA would not be available to see him today. Patient is still having infection type symptoms and is asking what he should do. He states he has physical therapy this afternoon and they will not touch him without provider approval.  Please advise.      Patient [phone]

## 2022-04-18 NOTE — TELEPHONE ENCOUNTER
Please see if Marvin Lim can see patient today or tomorrow, if not, I can see him Wednesday morning.

## 2022-04-19 ENCOUNTER — APPOINTMENT (OUTPATIENT)
Dept: PHYSICAL THERAPY | Age: 67
End: 2022-04-19
Payer: MEDICARE

## 2022-04-20 ENCOUNTER — APPOINTMENT (OUTPATIENT)
Dept: PHYSICAL THERAPY | Age: 67
End: 2022-04-20
Payer: MEDICARE

## 2022-04-20 ENCOUNTER — OFFICE VISIT (OUTPATIENT)
Dept: ORTHOPEDIC SURGERY | Age: 67
End: 2022-04-20
Payer: MEDICARE

## 2022-04-20 VITALS
WEIGHT: 227 LBS | BODY MASS INDEX: 32.5 KG/M2 | HEART RATE: 77 BPM | TEMPERATURE: 97.7 F | HEIGHT: 70 IN | OXYGEN SATURATION: 98 %

## 2022-04-20 DIAGNOSIS — Z96.652 S/P REVISION OF TOTAL KNEE, LEFT: Primary | ICD-10-CM

## 2022-04-20 DIAGNOSIS — M25.562 LEFT KNEE PAIN, UNSPECIFIED CHRONICITY: ICD-10-CM

## 2022-04-20 PROCEDURE — 73562 X-RAY EXAM OF KNEE 3: CPT | Performed by: PHYSICIAN ASSISTANT

## 2022-04-20 PROCEDURE — 99024 POSTOP FOLLOW-UP VISIT: CPT | Performed by: PHYSICIAN ASSISTANT

## 2022-04-20 RX ORDER — HYDROCODONE BITARTRATE AND ACETAMINOPHEN 7.5; 325 MG/1; MG/1
1-2 TABLET ORAL
Qty: 42 TABLET | Refills: 0 | Status: CANCELLED | OUTPATIENT
Start: 2022-04-20 | End: 2022-04-27

## 2022-04-20 RX ORDER — DOXYCYCLINE 100 MG/1
100 CAPSULE ORAL 2 TIMES DAILY
COMMUNITY

## 2022-04-20 NOTE — PROGRESS NOTES
[address] [facility], [address] [facility]  [phone]           Patient: Pauline Muir                MRN: [id number]       SSN: [id number]  YOB: 1955        AGE: 69 y.o. SEX: male  Body mass index is 32.57 kg/m². PCP: Carli Louis NP  04/20/22      This office note has been dictated. REVIEW OF SYSTEMS:  Constitutional: Negative for fever, chills, weight loss and malaise/fatigue. HENT: Negative. Eyes: Negative. Respiratory: Negative. Cardiovascular: Negative. Gastrointestinal: No bowel incontinence or constipation. Genitourinary: No bladder incontinence or saddle anesthesia. Skin: Negative. Neurological: Negative. Endo/Heme/Allergies: Negative. Psychiatric/Behavioral: Negative. Musculoskeletal: As per HPI above. Past Medical History:   Diagnosis Date    Arthritis     Chronic pain     knee and shoulder    DVT (deep venous thrombosis) ([facility] [address] 1992    post sx. R LEG    DVT (deep venous thrombosis) ([facility]) 2000    POST BACK SX. 2- LEFT LEG    GERD (gastroesophageal reflux disease)     Headache(784.0)     everyday    High cholesterol     Hypertension     Prostate cancer ([facility] [address] 2018    Pure hypercholesterolemia     Spinal stenosis     Thromboembolus ([facility])          Current Outpatient Medications:     doxycycline (MONODOX) 100 mg capsule, Take 100 mg by mouth two (2) times a day., Disp: , Rfl:     metaxalone (Skelaxin) 800 mg tablet, Take 1 Tablet by mouth three (3) times daily.  Take as needed  Indications: muscle spasm, Disp: 90 Tablet, Rfl: 3    gabapentin (NEURONTIN) 100 mg capsule, Take 2 capsules at bedtime as directed  Indications: neuropathic pain, Disp: 180 Capsule, Rfl: 1    clotrimazole-betamethasone (LOTRISONE) 1-0.05 % lotion, Apply  to affected area two (2) times a day., Disp: 30 mL, Rfl: 0    allopurinoL (ZYLOPRIM) 100 mg tablet, Take 1 Tablet by mouth two (2) times a day., Disp: 60 Tablet, Rfl: 0    L gasseri/B bifidum/B longum ([facility]), Take 1 Tablet by mouth nightly., Disp: , Rfl:     lansoprazole (PREVACID) 30 mg capsule, Take 30 mg by mouth daily. , Disp: , Rfl:     warfarin (COUMADIN) 5 mg tablet, TAKE 2 AND 1/2 TABLETS BY MOUTH DAILY OR AS DIRECTED (Patient taking differently: Take  by mouth daily. Patient takes 2  TABLETS BY MOUTH Daily or as directed), Disp: 75 Tab, Rfl: 0    lisinopril-hydroCHLOROthiazide (PRINZIDE, ZESTORETIC) 20-12.5 mg per tablet, TAKE 1 TABLET BY MOUTH DAILY (Patient taking differently: Take 1 Tab by mouth daily. TAKE 1 TABLET BY MOUTH DAILY), Disp: 90 Tab, Rfl: 1    labetalol (NORMODYNE) 100 mg tablet, TAKE 1 TABLET BY MOUTH TWICE DAILY. STOP VERAPAMIL (Patient taking differently: Take  by mouth two (2) times a day.), Disp: 180 Tab, Rfl: 0    butalbital-acetaminophen-caff (FIORICET) -40 mg per capsule, 2 cap three times per day as needed for headache (Patient taking differently: Take 1 Capsule by mouth daily. 2 cap three times per day as needed for headache), Disp: 180 Cap, Rfl: 1    ALPRAZolam (XANAX) 0.5 mg tablet, Take one half(1/2) tab to one(1) tab by mouth at bedtime as needed for sleep (Patient taking differently: Take  by mouth nightly as needed. Take one half(1/2) tab to one(1) tab by mouth at bedtime as needed for sleep), Disp: 30 Tab, Rfl: 0    montelukast (SINGULAIR) 10 mg tablet, TAKE 1 TABLET BY MOUTH EVERY DAY, Disp: 90 Tab, Rfl: 0    acetaminophen (TYLENOL) 500 mg tablet, Take 1,000 mg by mouth every six (6) hours as needed for Pain., Disp: , Rfl:     methylPREDNISolone (MEDROL DOSEPACK) 4 mg tablet, Per dose pack instructions (Patient not taking: Reported on 4/20/2022), Disp: 1 Dose Pack, Rfl: 0    docusate sodium (Colace) 100 mg capsule, Take 1 Capsule by mouth two (2) times a day for 90 days.  (Patient not taking: Reported on 4/20/2022), Disp: 60 Capsule, Rfl: 2    ondansetron hcl (Zofran) 4 mg tablet, Take 1 Tablet by mouth every eight (8) hours as needed for Nausea. (Patient not taking: Reported on 4/20/2022), Disp: 12 Tablet, Rfl: 0    Allergies   Allergen Reactions    Amoxicillin Itching    Augmentin [Amoxicillin-Pot Clavulanate] Itching    Chlorhexidine Unknown (comments)    Chlorhexidine Towelette Itching    Hibiclens [Chlorhexidine Gluconate] Itching    Milk Containing Products Diarrhea    Nsaids (Non-Steroidal Anti-Inflammatory Drug) Other (comments)     On blood thinner, contraindicated.  Penicillins Rash    Requip [Ropinirole] Nausea and Vomiting    Simvastatin Other (comments)       Social History     Socioeconomic History    Marital status:      Spouse name: Not on file    Number of children: Not on file    Years of education: Not on file    Highest education level: Not on file   Occupational History    Not on file   Tobacco Use    Smoking status: Never Smoker    Smokeless tobacco: Never Used   Vaping Use    Vaping Use: Never used   Substance and Sexual Activity    Alcohol use: No    Drug use: Never    Sexual activity: Not Currently   Other Topics Concern     Service Not Asked    Blood Transfusions Not Asked    Caffeine Concern Not Asked    Occupational Exposure Not Asked    Hobby Hazards Not Asked    Sleep Concern Not Asked    Stress Concern Not Asked    Weight Concern Not Asked    Special Diet Not Asked    Back Care Not Asked    Exercise Not Asked    Bike Helmet Not Asked    Seat Belt Not Asked    Self-Exams Not Asked   Social History Narrative    Not on file     Social Determinants of Health     Financial Resource Strain:     Difficulty of Paying Living Expenses: Not on file   Food Insecurity:     Worried About Running Out of Food in the Last Year: Not on file    Marley of Food in the Last Year: Not on file   Transportation Needs:     Lack of Transportation (Medical): Not on file    Lack of Transportation (Non-Medical):  Not on file Physical Activity:     Days of Exercise per Week: Not on file    Minutes of Exercise per Session: Not on file   Stress:     Feeling of Stress : Not on file   Social Connections:     Frequency of Communication with Friends and Family: Not on file    Frequency of Social Gatherings with Friends and Family: Not on file    Attends Scientologist Services: Not on file    Active Member of [address] [place] [address] or Organizations: Not on file    Attends Club or Organization Meetings: Not on file    Marital Status: Not on file   Intimate Partner Violence:     Fear of Current or Ex-Partner: Not on file    Emotionally Abused: Not on file    Physically Abused: Not on file    Sexually Abused: Not on file   Housing Stability:     Unable to Pay for Housing in the Last Year: Not on file    Number of [place] in the Last Year: Not on file    Unstable Housing in the Last Year: Not on file       Past Surgical History:   Procedure Laterality Date    HX HEENT Right 09/11/2018    eye surgery, macular     HX KNEE REPLACEMENT Left 2018    HX LUMBAR LAMINECTOMY  1992    HX LUMBAR LAMINECTOMY  2000    HX ORTHOPAEDIC  06-25-12    Right foot with excision of bursa and adipose tissue from fifth metatarsal base by Dr. Alonzo Leroy Left 03/09/2022    left knee revision    HX PROSTATECTOMY  11/2018    HX ROTATOR CUFF REPAIR Right 01/28/2019    by Dr. Osito Davenport ARTHROSCOPY Left 12/2020    WORK RELATED INJURY         Patient seen evaluated today for his left knee. He is now approaching 6 weeks status post revision left knee replacement. He had a loose femoral component. He has been doing well. He does state that he had some redness and streaking to his knee. [facility] and was started on doxycycline. This has improved. Denies any fevers or chills. He is increasing his range of motion. Swelling is decreasing. His pain is well controlled.     Patient denies recent fevers, chills, chest pain, SOB, or injuries. No recent systemic changes noted. A 12-point review of systems is performed today. Pertinent positives are noted. All other systems reviewed and otherwise are negative. Physical exam: General: Alert and oriented x3, nad.  well-developed, well nourished. normal affect, AF. NC/AT, EOMI, neck supple, trachea midline, no JVD present. Breathing is non-labored. Examination of left knee reveals skin intact. The surgical wound is healed nicely. There is no erythema or ecchymosis noted. There is minimal effusion. Negative patellar alignment. There are no signs of infection or cellulitis present. Range of motion is full extension with about a 3 degree extensor lag. There are no defects noted extensor mechanism. He flexes to 105 degrees. Radiographs obtained yesterday 4/20/2022 at the [facility] include AP, lateral, skyline of the left knee shows a total knee components to be well fixed without evidence for loosening or fracture noted. Assessment: Status post revision left knee replacement    Plan: At this point, the patient is improving. There are certainly no signs for septic joint or cellulitis. I have asked him to continue to finish up his antibiotics he was prescribed. Refill of Ana Maria Beers was sent to his pharmacy. He will continue with physical therapy as well as a home exercise program.  He is continue with ice therapy. We will see him back in 4 weeks time for evaluation.             JR Willie GATES, ESPERANZA, ATC

## 2022-04-20 NOTE — LETTER
NOTIFICATION    4/20/2022 10:04 AM    Mr. Burgess Davey  [address] [place] [id number]      To Whom It May Concern:    Burgess Davey is currently under the care of [address] [place] [address]. He is cleared to continue physical therapy. If there are questions or concerns please have the patient contact our office.         Sincerely,      Clarke Williamson PA-C

## 2022-04-22 ENCOUNTER — HOSPITAL ENCOUNTER (OUTPATIENT)
Dept: PHYSICAL THERAPY | Age: 67
Discharge: HOME | End: 2022-04-22
Payer: MEDICARE

## 2022-04-22 PROCEDURE — 97032 APPL MODALITY 1+ESTIM EA 15: CPT

## 2022-04-22 PROCEDURE — 97112 NEUROMUSCULAR REEDUCATION: CPT

## 2022-04-22 PROCEDURE — 97110 THERAPEUTIC EXERCISES: CPT

## 2022-04-22 NOTE — PROGRESS NOTES
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 He states he had his INR taken at 3.5 due to the antibiotics. He has been icing 3-6x per day but still have swelling behind the left knee. He has also been compliant with HEP. Pt states he is sleeping okay through the night but has a hard time getting to sleep. He states it is also easier for him to get in and out of bed and up from chairs. OBJECTIVE  Modality rationale: decrease edema, decrease inflammation, decrease pain, increase tissue extensibility and increase muscle contraction/control to improve the patients ability to bend knee without pain.    Min Type Additional Details      [] Estim:  []Unatt       []IFC  []Premod                        []Other:  []w/ice   []w/heat  Position:  Location:    10 [x] Estim: []Att    []TENS instruct  [x]NMES                    [x]Other: 10:30 []w/US   []w/ice   []w/heat  Position: seated at LAQ machine 60 degree angle  Location: left quad      []  Traction: [] Cervical       []Lumbar                       [] Prone          []Supine                       []Intermittent   []Continuous Lbs:  [] before manual  [] after manual      []  Ultrasound: []Continuous   [] Pulsed                           []1MHz   []3MHz W/cm2:  Location:      [] Iontophoresis with dexamethasone         Location: [] Take home patch   [] In clinic      []  Ice     []  heat  []  Ice massage  []  Laser   []  Anodyne Position:  Location:      []  Laser with stim  []  Other:  Position:  Location:    10 (5 minutes NC waiting for machine) [x]  Vasopneumatic Device    []  Right     [x]  Left  Pre-treatment girth: 18.25\"  Post-treatment girth:18\"  Measured at (location): mid patella Pressure:       [] lo [x] med [] hi   Temperature: [x] lo [] med [] hi    [x] Skin assessment post-treatment:  [x]intact []redness- no adverse reaction    []redness - adverse reaction:            32 min Therapeutic Exercise:  [x] See flow sheet :   Rationale: increase ROM, increase strength, improve coordination, improve balance and increase proprioception to improve the patients ability to ambulate for longer distances. 8 min Neuromuscular Re-education:  [x] See flow sheet :   Rationale: increase ROM, increase strength, improve coordination, improve balance and increase proprioception to improve the patients ability to ambulate for longer distances. With   [] TE   [] TA   [] neuro   [] other: Patient Education: [x] Review HEP    [] Progressed/Changed HEP based on:   [] positioning   [] body mechanics   [] transfers   [] heat/ice application    [] other:    Pain level post treatment:2/10    Other Objective/Functional Measures:     Sit to stands 9x with 1UE, 5x w/o UE  AROM left knee: 10-92 degrees  Asked PT to take a look at scar for second opinion   100 degrees flexion AAROM  Decreased heel strike and knee flexion during gait; decreased right step length    ASSESSMENT/Changes in Function:   Pt is showing steady progress in therapy. He was able to perform sit to stand 5x without UE and has improved with getting in/out of the car. His AROM increased to 10-92 degrees of the left knee.  Pt would benefit from continued A/AAROM exercises to increase flexibility to allow him to ambulate safely with greater ease. [x]  See Plan of Care  []  See progress note/recertification  []  See Discharge Summary         Progress towards goals / Updated goals:    Short Term Goals: To be accomplished in 1 weeks:  1. Patient will demonstrate compliance with HEP in order to improve left knee mobility for increased ease of ADLs  Met 4/12/22     Long Term Goals: To be accomplished in 4 weeks:  1. Patient will improve FOTO score by 26 points in order to demonstrate a significant improvement in function. 2. Patient will improve left knee AROM 0-100 degrees in order to increase ease of ambulation. Progressing 10-92 degrees (4/22/22)  3. Patient will improve left knee MMT to 4/5 in order to increase ease of stair negotiation. 4. Patient will perform a sit to stand transfer without UE support in order to increase ease of transfers at home. Progressing 9x with 1 UE, 5x w/o UE (4/22/22)      PLAN  [x]  Upgrade activities as tolerated     [x]  Continue plan of care  []  Update interventions per flow sheet       []  Discharge due to:_  []  Other:_      Patrecia Boast, SPTA 4/22/2022  5:19 PM    I was present during the entire treatment, directing and participating in the treatment.    CECELIA Loya   Future Appointments   Date Time Provider Dex Hatfield   4/22/2022  1:30 PM Cheli Hartman [facility]   4/26/2022  1:30 PM Андрей Pappas PTA [facility]   4/28/2022  2:15 PM Todd Blackwell, PT [facility]   5/3/2022  1:30 PM [facility]ндрей [facility]appas PTA [facility]   5/5/2022  1:30 PM Андрей Pappas PTA [facility]   5/20/2022  1:10 PM ESPERANZA BarrientosHV BS AMB   7/5/2022  1:45 PM MD NINA Mckenzie BS AMB   10/18/2022  1:00 PM [facility] NURSE [address] [place] [address]   10/25/2022  1:00 PM Given, Antoinette Magallanes MD [address] [facility]

## 2022-04-26 ENCOUNTER — HOSPITAL ENCOUNTER (OUTPATIENT)
Dept: PHYSICAL THERAPY | Age: 67
Discharge: HOME | End: 2022-04-26
Payer: MEDICARE

## 2022-04-26 PROCEDURE — 97016 VASOPNEUMATIC DEVICE THERAPY: CPT

## 2022-04-26 PROCEDURE — 97140 MANUAL THERAPY 1/> REGIONS: CPT

## 2022-04-26 PROCEDURE — 97032 APPL MODALITY 1+ESTIM EA 15: CPT

## 2022-04-26 PROCEDURE — 97530 THERAPEUTIC ACTIVITIES: CPT

## 2022-04-26 PROCEDURE — 97110 THERAPEUTIC EXERCISES: CPT

## 2022-04-28 ENCOUNTER — HOSPITAL ENCOUNTER (OUTPATIENT)
Dept: PHYSICAL THERAPY | Age: 67
Discharge: HOME | End: 2022-04-28
Payer: MEDICARE

## 2022-04-28 PROCEDURE — 97140 MANUAL THERAPY 1/> REGIONS: CPT

## 2022-04-28 PROCEDURE — 97016 VASOPNEUMATIC DEVICE THERAPY: CPT

## 2022-04-28 NOTE — PROGRESS NOTES
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 He states he is having left inner knee pain. He states getting up in the morning or in a sitting position is the worst pain. He finds himself forget his cane in places while home. He reports needing his cane for walking around in his yard on uneven surfaces. He states he has some burning in his left butt cheek. OBJECTIVE  Modality rationale: decrease edema, decrease inflammation, decrease pain, increase tissue extensibility and increase muscle contraction/control to improve the patients ability to bend knee without pain.    Min Type Additional Details      [] Estim:  []Unatt       []IFC  []Premod                        []Other:  []w/ice   []w/heat  Position:  Location:    10 [x] Estim: [x]Att    []TENS instruct  [x]NMES                    [x]Other: 10:50 []w/US   []w/ice   []w/heat  Position: seated at LAQ machine 60 degree angle  Location: left quad      []  Traction: [] Cervical       []Lumbar                       [] Prone          []Supine                       []Intermittent   []Continuous Lbs:  [] before manual  [] after manual      []  Ultrasound: []Continuous   [] Pulsed                           []1MHz   []3MHz W/cm2:  Location:      []  Iontophoresis with dexamethasone         Location: [] Take home patch   [] In clinic      []  Ice     []  heat  []  Ice massage  []  Laser   []  Anodyne Position:  Location:      []  Laser with stim  []  Other:  Position:  Location:    10  [x]  Vasopneumatic Device    []  Right     [x]  Left  Pre-treatment girth: 18.75\"  Post-treatment girth:18.0\"  Measured at (location): mid patella Pressure:       [] lo [x] med [] hi   Temperature: [x] lo [] med [] hi    [x] Skin assessment post-treatment:  [x]intact []redness- no adverse reaction    []redness - adverse reaction:            8 min Therapeutic Exercise:  [x] See flow sheet :    Rationale: increase ROM, increase strength, improve coordination, improve balance and increase proprioception to improve the patients ability to ambulate for longer distances. 8 min Therapeutic Activity :  [x] See flow sheet :Pt education Hep, tennis ball, stretching, ambulation   Rationale: increase ROM, increase strength, improve coordination, improve balance and increase proprioception to improve the patients ability to ambulate for longer distances. 17 min Manual therapy:  [x] See flow sheet :right leg lengthening technique to correct right upslip, MET to correct left/left sacral torsion; TPR and tennis ball to HS. Rationale: increase ROM, increase strength, improve coordination, improve balance and increase proprioception to improve the patients ability to ambulate for longer distances.               With   [] TE   [] TA   [] neuro   [] other: Patient Education: [x] Review HEP    [] Progressed/Changed HEP based on:   [] positioning   [] body mechanics   [] transfers   [] heat/ice application    [] other:        Other Objective/Functional Measures:    Decreased heel strike and knee flexion during gait; decreased right step length, right step length and heel strike improved since last visit and with VC  Centralization of left radicular symptoms with correction of alignment   AROM left knee flexion 8 degrees  AAROM left knee extension 103    Pain level post treatment:2/10    ASSESSMENT/Changes in Function:   Pt is showing slow but steady progress in therapy. Pt A/AROM has improved with each visit. He is able to ambulate around his home some of the time without a cane. He continues to have pain with getting out of bed, sitting with knees bent and getting out of the car. He would benefit from continued skilled therapy to increase overall AROM to allow him to ambulate safely and with greater ease. [x]  See Plan of Care  []  See progress note/recertification  []  See Discharge Summary         Progress towards goals / Updated goals:    Short Term Goals: To be accomplished in 1 weeks:  1. Patient will demonstrate compliance with HEP in order to improve left knee mobility for increased ease of ADLs  Met 4/12/22   Long Term Goals: To be accomplished in 4 weeks:  1. Patient will improve FOTO score by 26 points in order to demonstrate a significant improvement in function. 2. Patient will improve left knee AROM 0-100 degrees in order to increase ease of ambulation. Progressing 10-92 degrees (4/22/22) 102 degrees flexion (4/26/22)  3. Patient will improve left knee MMT to 4/5 in order to increase ease of stair negotiation. 4. Patient will perform a sit to stand transfer without UE support in order to increase ease of transfers at home. Progressing 9x with 1 UE, 5x w/o UE (4/22/22)      PLAN  [x]  Upgrade activities as tolerated     [x]  Continue plan of care  []  Update interventions per flow sheet       []  Discharge due to:_  []  Other:_      MATTHIAS Morris 4/28/2022  5:19 PM    I was present during the entire treatment, directing and participating in the treatment.   I was 1:1 with this patient for 10 minutes during manual.    Ai Street DPT     Future Appointments   Date Time Provider Dex Hatfield   5/3/2022  1:30 PM Rozell Osler, PTA [facility]   5/5/2022  1:30 PM Alejandrina Shimon Pearson [facility]   5/20/2022  1:10 PM Latasha Albarran PA-C [facility] BS AMB   7/5/2022  1:45 PM Genesis Alonso MD [facility] BS AMB   10/18/2022  1:00 PM [facility] NURSE [facility]   10/25/2022  1:00 PM Amor, Stepan Rojas MD [facility]

## 2022-05-03 ENCOUNTER — HOSPITAL ENCOUNTER (OUTPATIENT)
Dept: PHYSICAL THERAPY | Age: 67
Discharge: HOME | End: 2022-05-03
Payer: MEDICARE

## 2022-05-03 PROCEDURE — 97032 APPL MODALITY 1+ESTIM EA 15: CPT

## 2022-05-03 PROCEDURE — 97110 THERAPEUTIC EXERCISES: CPT

## 2022-05-03 PROCEDURE — 97112 NEUROMUSCULAR REEDUCATION: CPT

## 2022-05-03 PROCEDURE — 97140 MANUAL THERAPY 1/> REGIONS: CPT

## 2022-05-03 PROCEDURE — 97016 VASOPNEUMATIC DEVICE THERAPY: CPT

## 2022-05-03 NOTE — PROGRESS NOTES
PT DAILY TREATMENT NOTE     Patient Name: Mia Skinner  Date:5/3/2022  : 1955  [x]  Patient  Verified  Payor: [place] MEDICARE / Plan: [place] MEDICARE PART A & B / Product Type: Medicare /    In time: 1:35  Out time:2:51  Total Treatment Time (min):  76 min  Visit #: 6 of 12    Medicare/BCBS Only   Total Timed Codes (min):  66 1:1 Treatment Time:  66       Treatment Area: Left knee pain [M25.562]    SUBJECTIVE  Pain Level (0-10 scale): 3/10  Any medication changes, allergies to medications, adverse drug reactions, diagnosis change, or new procedure performed?: [x] No    [] Yes (see summary sheet for update)  Subjective functional status/changes:   [] No changes reported    Pt reports continued pain in low back pain primarily on left side. He states the only thing he did differently was lift his 21# granddaughter. He has less pain with driving and going up and down stairs. OBJECTIVE  Modality rationale: decrease edema, decrease inflammation, decrease pain, increase tissue extensibility and increase muscle contraction/control to improve the patients ability to bend knee without pain.    Min Type Additional Details      [] Estim:  []Unatt       []IFC  []Premod                        []Other:  []w/ice   []w/heat  Position:  Location:    10 [x] Estim: [x]Att    []TENS instruct  [x]NMES                    [x]Other: 10:20 []w/US   []w/ice   []w/heat  Position: long sitting  Location: left quad      []  Traction: [] Cervical       []Lumbar                       [] Prone          []Supine                       []Intermittent   []Continuous Lbs:  [] before manual  [] after manual      []  Ultrasound: []Continuous   [] Pulsed                           []1MHz   []3MHz W/cm2:  Location:      []  Iontophoresis with dexamethasone         Location: [] Take home patch   [] In clinic      []  Ice     []  heat  []  Ice massage  []  Laser   []  Anodyne Position:  Location:      []  Laser with stim  []  Other: Position:  Location:    10  [x]  Vasopneumatic Device    []  Right     [x]  Left  Pre-treatment girth: 17.75\"  Post-treatment girth:17.5\"  Measured at (location): mid patella Pressure:       [] lo [x] med [] hi   Temperature: [x] lo [] med [] hi    [x] Skin assessment post-treatment:  [x]intact []redness- no adverse reaction    []redness - adverse reaction:            22 min Therapeutic Exercise:  [x] See flow sheet : objective measures, FOTO   Rationale: increase ROM, increase strength, improve coordination, improve balance and increase proprioception to improve the patients ability to ambulate for longer distances. 18 min Neuromuscular Re-education :  [x] See flow sheet : TKE ball to wall, sit to stands with Left post, SAQ   Rationale: increase ROM, increase strength, improve coordination, improve balance and increase proprioception to improve the patients ability to ambulate for longer distances. 16 min Manual therapy:  [x] See flow sheet :left leg lengthening technique to correct left upslip, shot gun technique, MWM to correct left/left sacral torsion,  to ITB, contract relax to quadriceps     Rationale: increase ROM, increase strength, improve coordination, improve balance and increase proprioception to improve the patients ability to ambulate for longer distances.           With   [] TE   [] TA   [] neuro   [] other: Patient Education: [x] Review HEP    [] Progressed/Changed HEP based on:   [] positioning   [] body mechanics   [] transfers   [] heat/ice application    [] other:        Other Objective/Functional Measures:    FOTO 39/100  Left knee PROM: 1-99 degrees AROM: 7-95 degrees  Left knee MMT: flexion 5/5 extension 4+/5  Sit to stands w/o UE   Challenged with NMES SAQ with 10/20 cycle time with 12\"  Hold, tactile cueing to prevent external rotation  Added TKE with ball to wall for HEP   B lumbar paraspinals TTP  Increased AROM following MT  Instructed to perform sit to stands with left LE posterior    Pain level post treatment:1 /10    ASSESSMENT:  See Recertification. [x]  See Plan of Care  []  See progress note/recertification  []  See Discharge Summary           Progress towards goals / Updated goals:  Short Term Goals: To be accomplished in 1 weeks:  1. Patient will demonstrate compliance with HEP in order to improve left knee mobility for increased ease of ADLs  Met 4/12/22   Long Term Goals: To be accomplished in 4 weeks:  1. Patient will improve FOTO score by 26 points in order to demonstrate a significant improvement in function. NOT MET 10 points, FOTO 39/100 (5/3/22)  2. Patient will improve left knee AROM 0-100 degrees in order to increase ease of ambulation. Progressing 10-92 degrees (4/22/22) 102 degrees flexion (4/26/22)  Progressing Left knee PROM: 1-99 degrees AROM: 7-95 degrees  3. Patient will improve left knee MMT to 4/5 in order to increase ease of stair negotiation. MET Left knee MMT: flexion 5/5 extension 4+/5 (5/3/22)  4. Patient will perform a sit to stand transfer without UE support in order to increase ease of transfers at home. Progressing 9x with 1 UE, 5x w/o UE (4/22/22)  MET (5/3/22)      PLAN  [x]  Upgrade activities as tolerated     [x]  Continue plan of care  []  Update interventions per flow sheet       []  Discharge due to:_  [x]  Other:_   Try Heel slides MWM tibial IR    Karyle Sing, SPTA 5/3/2022  5:19 PM    I was present during the entire treatment, directing and participating in the treatment.    CECELIA Oden    Future Appointments   Date Time Provider Dex [place]   5/3/2022  1:30 PM Za Douglas [facility]   5/5/2022  1:30 PM Mary Chavez PTA [facility]   5/20/2022  1:10 PM ESPERANZA BurrowsHV BS AMB   7/5/2022  1:45 PM MD AMY JeanMO BS AMB   10/18/2022  1:00 PM Roverto Brush   10/25/2022  1:00 PM Richy Chinchilla MD [address] [facility]

## 2022-05-04 NOTE — PROGRESS NOTES
[facility]  [address] [facility]  [phone] fax    Continued Plan of Care/ Re-certification for Physical Therapy Services    Patient name: Prosper Abad Start of Care: 2022   Referral source: Alla Ahn : 1955   Medical/Treatment Diagnosis: Left knee pain [M25.562]  Payor: [place] / Plan: [place] MEDICARE PART A & B / Product Type: Medicare /  Onset Date:  3/9/22     Prior Hospitalization: see medical history Provider#: [id number]   Medications: Verified on Patient Summary List     Comorbidities: HTN, arthritis, history of DVT, history of cancer   Prior Level of Function: Ind with ambulation, hiking, hunting, fishing, Ind with ADLs          Visits from Start of Care: 6    Missed Visits: 0    The Plan of Care and following information is based on the patient's current status:  Goal: Patient will demonstrate compliance with HEP in order to improve left knee mobility for increased ease of ADLs. Status at last note/certification: Initiated  Current Status: met daily compliance    Goal: Patient will improve FOTO score by 26 points in order to demonstrate a significant improvement in function. Status at last note/certification: 91/481  Current Status: not met 39/100    Goal: Patient will improve left knee AROM 0-100 degrees in order to increase ease of ambulation. Status at last note/certification: 48-27 degrees  Current Status: not met 7-95 degrees    Goal: Patient will improve left knee MMT to 4/5 in order to increase ease of stair negotiation. Status at last note/certification: 3/5 extension 3+/5 flexion  Current Status: met 5/5 flexion 4+/5 extension within available range    Goal: Patient will perform a sit to stand transfer without UE support in order to increase ease of transfers at home.    Status at last note/certification: requires B UEs  Current Status: met    Key functional changes: increased AROM; increased strength; increased FOTO score; improved ease of transfers, walking and driving      Problems/ barriers to goal attainment: none    Problem List: pain affecting function, decrease ROM, decrease strength, edema affecting function, impaired gait/ balance, decrease ADL/ functional abilitiies, decrease activity tolerance, decrease flexibility/ joint mobility and decrease transfer abilities    Treatment Plan: Therapeutic exercise, Therapeutic activities, Neuromuscular re-education, Physical agent/modality, Gait/balance training, Manual therapy, Patient education, Self Care training, Functional mobility training, Home safety training and Stair training     Patient Goal (s) has been updated and includes: \"to have less swelling and less pain\"     Goals for this certification period to be accomplished in 4 weeks:  1. Patient will improve FOTO score to at least 39/100 to demonstrate improvement in functional mobility. 2. Patient will report \"little difficulty\" when asked on the FOTO questionnaire, \"How much difficulty do you have with any of your usual work, housework, or school activities? \" to demonstrate improved ease of completing household chores. 3. Patient will improve left knee AROM 0-100 degrees in order to increase ease of ambulation. 4. Patient will improve left knee extension MMT to 5/5 in order to increase ease of stair negotiation. Frequency / Duration: Patient to be seen 2-3 times per week for 4 weeks:    Assessment / Recommendations: Mr. Ruthie Henry is making steady progress towards goals in therapy with improved left knee AROM to 7-95 degrees. He passively is 1 degree from full TKE but limited by moderate swelling causing quad inhibition to achieve for normalized gait. He continues to require [facility] for ambulation due to decreased TKE strength and decreased knee flexion during swing phase of gait.  He has overall improvement in transfers and driving in the car but wants to return to more recreational walking and hiking. His FOTO increased 10 points to 49/100 and strength improved to 4+/5 within available range. Skilled PT remains medically necessary to focus on improving TKE stability and AROM for decreased pain to improve gait pattern and ease of transfers. Certification Period: 5/4/2022 to 6/2/2022    Cassie Leonard, PTA 5/4/2022 2:44 PM    ________________________________________________________________________  I certify that the above Therapy Services are being furnished while the patient is under my care. I agree with the treatment plan and certify that this therapy is necessary. [] I have read the above and request that my patient continue as recommended.   [] I have read the above report and request that my patient continue therapy with the following changes/special instructions: _______________________________________  [] I have read the above report and request that my patient be discharged from therapy    Physician's Signature:____________Date:_________TIME:________     Rajesh Ledezma PA-C  ** Signature, Date and Time must be completed for valid certification **    Please sign and return to [facility]  [address] [place] [address]  [phone] fax

## 2022-05-05 ENCOUNTER — HOSPITAL ENCOUNTER (OUTPATIENT)
Dept: PHYSICAL THERAPY | Age: 67
Discharge: HOME | End: 2022-05-05
Payer: MEDICARE

## 2022-05-05 PROCEDURE — 97112 NEUROMUSCULAR REEDUCATION: CPT

## 2022-05-05 PROCEDURE — 97110 THERAPEUTIC EXERCISES: CPT

## 2022-05-05 PROCEDURE — 97016 VASOPNEUMATIC DEVICE THERAPY: CPT

## 2022-05-05 NOTE — PROGRESS NOTES
PT DAILY TREATMENT NOTE     Patient Name: Pauline Muir  Date:2022  : 1955  [x]  Patient  Verified  Payor: VA MEDICARE / Plan: VA MEDICARE PART A & B / Product Type: Medicare /    In time:130  Out time:225  Total Treatment Time (min): 55  Visit #: 1 of     Medicare/BCBS Only   Total Timed Codes (min):  40 1:1 Treatment Time:  40       Treatment Area: Left knee pain [M25.562]    SUBJECTIVE  Pain Level (0-10 scale): 3/10  Any medication changes, allergies to medications, adverse drug reactions, diagnosis change, or new procedure performed?: [x] No    [] Yes (see summary sheet for update)  Subjective functional status/changes:   [] No changes reported  Pt stated that his knee is better and does not have constant pain anymore.      OBJECTIVE    Modality rationale: decrease inflammation and decrease pain to improve the patients ability to increase ease with ADls   Min Type Additional Details    [] Estim:  []Unatt       []IFC  []Premod                        []Other:  []w/ice   []w/heat  Position:  Location:    [] Estim: []Att    []TENS instruct  []NMES                    []Other:  []w/US   []w/ice   []w/heat  Position:  Location:    []  Traction: [] Cervical       []Lumbar                       [] Prone          []Supine                       []Intermittent   []Continuous Lbs:  [] before manual  [] after manual    []  Ultrasound: []Continuous   [] Pulsed                           []1MHz   []3MHz W/cm2:  Location:    []  Iontophoresis with dexamethasone         Location: [] Take home patch   [] In clinic    []  Ice     []  heat  []  Ice massage  []  Laser   []  Anodyne Position:  Location:    []  Laser with stim  []  Other:  Position:  Location:   15 [x]  Vasopneumatic Device    []  Right     [x]  Left  Pre-treatment girth: 17.5\"  Post-treatment girth:17\"  Measured at (location): joint line Pressure:       [x] lo [] med [] hi   Temperature: [x] lo [] med [] hi   [x] Skin assessment post-treatment: [x]intact []redness- no adverse reaction    []redness - adverse reaction:     30 min Therapeutic Exercise:  [x] See flow sheet :   Rationale: increase ROM and increase strength to improve the patients ability to increase ease with ADls     10 min Neuromuscular Re-education:  [x]  See flow sheet :   Rationale: increase strength, improve coordination, improve balance and increase proprioception  to improve the patients ability to increase ease with functional activities    With   [x] TE   [] TA   [] neuro   [] other: Patient Education: [x] Review HEP    [] Progressed/Changed HEP based on:   [] positioning   [] body mechanics   [] transfers   [] heat/ice application    [] other:      Other Objective/Functional Measures:   Had no difficulty with exercises  Cont with significant weakness in the left quads  Had shaking with LAQ  Was fatigued after session     Pain Level (0-10 scale) post treatment: 2/10    ASSESSMENT/Changes in Function:   Pt is making slow progress toward goals. Pt cont with significant weakness in the left LE. AROM is improving in the left knee. Cont to use SPC with community ambulation, but had begun to ambulate without AD at home. Step ups on the left cont to be very challenging    Patient will continue to benefit from skilled PT services to modify and progress therapeutic interventions, address functional mobility deficits, address ROM deficits, address strength deficits, analyze and address soft tissue restrictions, analyze and cue movement patterns, analyze and modify body mechanics/ergonomics, assess and modify postural abnormalities, address imbalance/dizziness and instruct in home and community integration to attain remaining goals. []  See Plan of Care  []  See progress note/recertification  []  See Discharge Summary         Progress towards goals / Updated goals:  1. Patient will improve FOTO score to at least 39/100 to demonstrate improvement in functional mobility.    2. Patient will report \"little difficulty\" when asked on the FOTO questionnaire, \"How much difficulty do you have with any of your usual work, housework, or school activities? \" to demonstrate improved ease of completing household chores. 3. Patient will improve left knee AROM 0-100 degrees in order to increase ease of ambulation. 4. Patient will improve left knee extension MMT to 5/5 in order to increase ease of stair negotiation.     PLAN  []  Upgrade activities as tolerated     [x]  Continue plan of care  []  Update interventions per flow sheet       []  Discharge due to:_  []  Other:_      Randolph Boys, PTA 5/5/2022  6:50 AM    Future Appointments   Date Time Provider Dex Alysia   5/5/2022  1:30 PM Cristofer Santos PTA [facility]   5/10/2022  1:30 PM Cristofer Santos PTA [facility]   5/12/2022  1:30 PM Cristofer Santos PTA [facility]   5/17/2022  1:30 PM Cristofer Santos PTA [facility]   5/19/2022  1:30 PM Cristofer Santos PTA [facility]   5/20/2022  1:10 PM ESPERANZA Barraza BS AMB   5/24/2022 11:15 AM Dio Diallo, PT [facility]   5/26/2022 11:15 AM Bard Grams, PTA [facility]   6/1/2022  2:15 PM Bard Grams, PTA [facility]   6/3/2022  1:30 PM Verena Jefferson, PT [facility]   7/5/2022  1:45 PM Lucero Fontaine MD [facility] BS AMB   10/18/2022  1:00 PM [facility] NURSE [place]   10/25/2022  1:00 PM Amaya Chinchilla MD [address]

## 2022-05-10 ENCOUNTER — HOSPITAL ENCOUNTER (OUTPATIENT)
Dept: PHYSICAL THERAPY | Age: 67
Discharge: HOME | End: 2022-05-10
Payer: MEDICARE

## 2022-05-10 PROCEDURE — 97110 THERAPEUTIC EXERCISES: CPT

## 2022-05-10 PROCEDURE — 97112 NEUROMUSCULAR REEDUCATION: CPT

## 2022-05-10 NOTE — PROGRESS NOTES
PT DAILY TREATMENT NOTE     Patient Name: Sultana Park  Date:5/10/2022  : 1955  [x]  Patient  Verified  Payor: VA MEDICARE / Plan: VA MEDICARE PART A & B / Product Type: Medicare /    In time:130  Out time:225  Total Treatment Time (min): 55  Visit #: 2 of     Medicare/BCBS Only   Total Timed Codes (min):  40 1:1 Treatment Time:  40       Treatment Area: Left knee pain [M25.562]    SUBJECTIVE  Pain Level (0-10 scale): 3/10  Any medication changes, allergies to medications, adverse drug reactions, diagnosis change, or new procedure performed?: [x] No    [] Yes (see summary sheet for update)  Subjective functional status/changes:   [] No changes reported  Pt stated that he is about the same, 3/10 pain today in the knee. OBJECTIVE    Modality rationale: decrease inflammation and decrease pain to improve the patients ability to increase ease with ADls   Min Type Additional Details      []? Estim:  []? Unatt       []? IFC  []? Premod                        []?Other:  []?w/ice   []?w/heat  Position:  Location:      []? Estim: []? Att    []? TENS instruct  []? NMES                    []?Other:  []?w/US   []?w/ice   []?w/heat  Position:  Location:      []? Traction: []? Cervical       []? Lumbar                       []? Prone          []? Supine                       []?Intermittent   []? Continuous Lbs:  []? before manual  []? after manual      []? Ultrasound: []? Continuous   []? Pulsed                           []? 1MHz   []? 3MHz W/cm2:  Location:      []? Iontophoresis with dexamethasone         Location: []? Take home patch   []? In clinic      []? Ice     []?  heat  []? Ice massage  []? Laser   []? Anodyne Position:  Location:      []? Laser with stim  []? Other:  Position:  Location:    15 [x]? Vasopneumatic Device    []? Right     [x]? Left  Pre-treatment girth: 17.5\"  Post-treatment girth:17\"  Measured at (location): joint line Pressure:       [x]? lo []? med []? hi   Temperature: [x]? lo []? med []? hi    [x]? Skin assessment post-treatment:  [x]? intact []? redness- no adverse reaction    []? redness - adverse reaction:      30 min Therapeutic Exercise:  [x]? See flow sheet :   Rationale: increase ROM and increase strength to improve the patients ability to increase ease with ADls     10 min Neuromuscular Re-education:  [x]? See flow sheet :   Rationale: increase strength, improve coordination, improve balance and increase proprioception  to improve the patients ability to increase ease with functional activities    With   [x] TE   [] TA   [] neuro   [] other: Patient Education: [x] Review HEP    [] Progressed/Changed HEP based on:   [] positioning   [] body mechanics   [] transfers   [] heat/ice application    [] other:      Other Objective/Functional Measures:   Had no difficulty with exercises  SAQ were very challenging  Added 2# with LAQ, very challenging     Pain Level (0-10 scale) post treatment: 2/10    ASSESSMENT/Changes in Function:   Pt is making slow progress toward goals. Pt cont with decreased strength and AROM in the left knee. Cont with decreased standing and walking tolerance. Patient will continue to benefit from skilled PT services to modify and progress therapeutic interventions, address functional mobility deficits, address ROM deficits, address strength deficits, analyze and address soft tissue restrictions, analyze and cue movement patterns, analyze and modify body mechanics/ergonomics, assess and modify postural abnormalities, address imbalance/dizziness and instruct in home and community integration to attain remaining goals. []  See Plan of Care  [x]  See progress note/recertification  []  See Discharge Summary         Progress towards goals / Updated goals:  1. Patient will improve FOTO score to at least 39/100 to demonstrate improvement in functional mobility.    2. Patient will report \"little difficulty\" when asked on the FOTO questionnaire, \"How much difficulty do you have with any of your usual work, housework, or school activities? \" to demonstrate improved ease of completing household chores. 3. Patient will improve left knee AROM 0-100 degrees in order to increase ease of ambulation.   4. Patient will improve left knee extension MMT to 5/5 in order to increase ease of stair negotiation.     PLAN  []  Upgrade activities as tolerated     [x]  Continue plan of care  []  Update interventions per flow sheet       []  Discharge due to:_  []  Other:_      Nelwyn Client, PTA 5/10/2022  6:54 AM    Future Appointments   Date Time Provider Dex Hazeli   5/10/2022  1:30 PM Apoorva Sterling, PTA [facility]   5/12/2022  1:30 PM Apoorva Sterling, PTA [facility]   5/17/2022  1:30 PM Apoorva Sterling, PTA [facility]   5/19/2022  1:30 PM Apoorva Sterling, PTA [facility]   5/20/2022  1:10 PM ESPERANZA Lozada BS AMB   5/24/2022 11:15 AM Javier MUNIZ, PT [facility]   5/26/2022 11:15 AM Jonny Fregoso, PTA [facility]   6/1/2022  2:15 PM Jonny Fregoso, PTA [facility]   6/3/2022  1:30 PM Tatiana Kay, PT [facility]   7/5/2022  1:45 PM Rossy Katz MD [facility] BS AMB   10/18/2022  1:00 PM [facility] NURSE [address]   10/25/2022  1:00 PM Mayela Chinchilla MD [address]

## 2022-05-12 ENCOUNTER — HOSPITAL ENCOUNTER (OUTPATIENT)
Dept: PHYSICAL THERAPY | Age: 67
Discharge: HOME | End: 2022-05-12
Payer: MEDICARE

## 2022-05-12 PROCEDURE — 97016 VASOPNEUMATIC DEVICE THERAPY: CPT

## 2022-05-12 PROCEDURE — 97112 NEUROMUSCULAR REEDUCATION: CPT

## 2022-05-12 PROCEDURE — 97110 THERAPEUTIC EXERCISES: CPT

## 2022-05-12 NOTE — PROGRESS NOTES
PT DAILY TREATMENT NOTE     Patient Name: Saul Gee  DICT:1976  : 1955  [x]  Patient  Verified  Payor: [place] MED[place]ARE / Plan: [place] MEDICARE PART A & B / Product Type: Medicare /    In time:130  Out time:226  Total Treatment Time (min): 56  Visit #: 3 of     Medicare/BCBS Only   Total Timed Codes (min):  41 1:1 Treatment Time:  41       Treatment Area: Left knee pain [M25.562]    SUBJECTIVE  Pain Level (0-10 scale): 10  Any medication changes, allergies to medications, adverse drug reactions, diagnosis change, or new procedure performed?: [x] No    [] Yes (see summary sheet for update)  Subjective functional status/changes:   [] No changes reported  Pt stated that he is having more pain today probably due to the weather    OBJECTIVE    Modality rationale: decrease inflammation and decrease pain to improve the patients ability to increase ease with ADls   Min Type Additional Details       []? ? Estim:  []?? Unatt       []? ?IFC  []? ?Premod                        []? ? Other:  []??w/ice   []? ?w/heat  Position:  Location:       []? ? Estim: []??Att    []? ?TENS instruct  []? ?NMES                    []? ? Other:  []??w/US   []? ?w/ice   []? ?w/heat  Position:  Location:       []? ?  Traction: []?? Cervical       []? ?Lumbar                       []? ? Prone          []? ?Supine                       []? ?Intermittent   []? ? Continuous Lbs:  []?? before manual  []? ? after manual       []? ?  Ultrasound: []??Continuous   []? ? Pulsed                           []? ?1MHz   []? ? 3MHz W/cm2:  Location:       []? ?  Iontophoresis with dexamethasone         Location: []?? Take home patch   []? ? In clinic       []? ?  Ice     []? ?  heat  []? ?  Ice massage  []? ?  Laser   []? ?  Anodyne Position:  Location:       []? ?  Laser with stim  []? ?  Other:  Position:  Location:     15 [x]? ?  Vasopneumatic Device    []? ?  Right     [x]? ?  Left  Pre-treatment girth: 17.5\"  Post-treatment girth:17\"  Measured at (location): joint line Pressure:       [x]? ? lo []? ? med []?? hi   Temperature: [x]? ? lo []? ? med []?? hi     [x]? ? Skin assessment post-treatment:  [x]? ? intact []? ?redness- no adverse reaction    []? ?redness - adverse reaction:      30 min Therapeutic Exercise:  [x]? ? See flow sheet :   Rationale: increase ROM and increase strength to improve the patients ability to increase ease with ADls     11 min Neuromuscular Re-education:  [x]? ?  See flow sheet :   Rationale: increase strength, improve coordination, improve balance and increase proprioception  to improve the patients ability to increase ease with functional activities    With   [x] TE   [] TA   [] neuro   [] other: Patient Education: [x] Review HEP    [] Progressed/Changed HEP based on:   [] positioning   [] body mechanics   [] transfers   [] heat/ice application    [] other:      Other Objective/Functional Measures:   No difficulty with exercises  No complaint of increased pain during session   Pt was very talkative and hard to keep on task   Had less shaking with LAQ today    Pain Level (0-10 scale) post treatment: 2/10    ASSESSMENT/Changes in Function:   Pt is making slow progress toward goals. Cont with decreased strength and AROM in the left knee. Cont to have difficulty with performing ADLs and household chores. Cont with decreased standing and walking tolerance. Patient will continue to benefit from skilled PT services to modify and progress therapeutic interventions, address functional mobility deficits, address ROM deficits, address strength deficits, analyze and address soft tissue restrictions, analyze and cue movement patterns, analyze and modify body mechanics/ergonomics, assess and modify postural abnormalities, address imbalance/dizziness and instruct in home and community integration to attain remaining goals. []  See Plan of Care  [x]  See progress note/recertification  []  See Discharge Summary         Progress towards goals / Updated goals:  1.  Patient will improve FOTO score to at least 39/100 to demonstrate improvement in functional mobility. 2. Patient will report \"little difficulty\" when asked on the FOTO questionnaire, \"How much difficulty do you have with any of your usual work, housework, or school activities? \" to demonstrate improved ease of completing household chores. 3. Patient will improve left knee AROM 0-100 degrees in order to increase ease of ambulation.   4. Patient will improve left knee extension MMT to 5/5 in order to increase ease of stair negotiation.     PLAN  []  Upgrade activities as tolerated     [x]  Continue plan of care  []  Update interventions per flow sheet       []  Discharge due to:_  []  Other:_      John Kim PTA 5/12/2022  6:55 AM    Future Appointments   Date Time Provider [place]   5/12/2022  1:30 PM Sue Heorly, PTA [facility]   5/17/2022  1:30 PM Spero Heorly, PTA [facility]   5/19/2022  1:30 PM Sue Marie, PTA [facility]   5/20/2022  1:10 PM ESPERANZA Parada BS AMB   5/24/2022 11:15 AM Michele MUNIZ, PT [facility]   5/26/2022 11:15 AM Dennis Herman, PTA [facility]   6/1/2022  2:15 PM Dennis Herman, PTA [facility]   6/3/2022  1:30 PM Americo Newman, PT [facility]   7/5/2022  1:45 PM Andreina Mishra MD [facility] BS AMB   10/18/2022  1:00 PM [facility] NURSE [place]   10/25/2022  1:00 PM Amor, Pippa Cook MD [id number]

## 2022-05-17 ENCOUNTER — HOSPITAL ENCOUNTER (OUTPATIENT)
Dept: PHYSICAL THERAPY | Age: 67
Discharge: HOME | End: 2022-05-17
Payer: MEDICARE

## 2022-05-17 PROCEDURE — 97016 VASOPNEUMATIC DEVICE THERAPY: CPT

## 2022-05-17 PROCEDURE — 97112 NEUROMUSCULAR REEDUCATION: CPT

## 2022-05-17 PROCEDURE — 97110 THERAPEUTIC EXERCISES: CPT

## 2022-05-17 NOTE — PROGRESS NOTES
PT DAILY TREATMENT NOTE     Patient Name: Fely Curtis  OIAU:  : 1955  [x]  Patient  Verified  Payor: [place] MEDICARE / Plan: [place] MEDICARE PART A & B / Product Type: Medicare /    In time:130  Out time:225  Total Treatment Time (min): 55  Visit #: 4 of     Medicare/BCBS Only   Total Timed Codes (min):  40 1:1 Treatment Time:  40       Treatment Area: Left knee pain [M25.562]    SUBJECTIVE  Pain Level (0-10 scale): 2-3/10  Any medication changes, allergies to medications, adverse drug reactions, diagnosis change, or new procedure performed?: [x] No    [] Yes (see summary sheet for update)  Subjective functional status/changes:   [] No changes reported  Pt stated that he always has pain, but it is less today    OBJECTIVE    Modality rationale: decrease inflammation and decrease pain to improve the patients ability to increase ease with ADls   Min Type Additional Details       []??? Estim:  []? ??Unatt       []? ??IFC  []? ? ?Premod                        []? ??Other:  []???w/ice   []? ??w/heat  Position:  Location:       []??? Estim: []? ? ? Att    []? ??TENS instruct  []? ??NMES                    []? ??Other:  []???w/US   []? ??w/ice   []? ??w/heat  Position:  Location:       []? ??  Traction: []??? Cervical       []? ? ?Lumbar                       []? ?? Prone          []? ? ?Supine                       []? ? ? Intermittent   []? ?? Continuous Lbs:  []??? before manual  []? ?? after manual       []? ??  Ultrasound: []? ? ? Continuous   []? ?? Pulsed                           []? ??1MHz   []? ??3MHz W/cm2:  Location:       []? ??  Iontophoresis with dexamethasone         Location: []??? Take home patch   []??? In clinic       []? ??  Ice     []? ??  heat  []? ??  Ice massage  []? ??  Laser   []? ??  Anodyne Position:  Location:       []? ??  Laser with stim  []? ??  Other:  Position:  Location:     15 [x]? ??  Vasopneumatic Device    []? ??  Right     [x]? ??  Left  Pre-treatment girth: 17.5\"  Post-treatment girth:17\"  Measured at (location): joint line Pressure:       [x]? ?? lo []? ?? med []? ?? hi   Temperature: [x]? ?? lo []? ?? med []? ?? hi     [x]? ?? Skin assessment post-treatment:  [x]? ??intact []? ??redness- no adverse reaction    []? ??redness - adverse reaction:      30 min Therapeutic Exercise:  [x]? ?? See flow sheet :   Rationale: increase ROM and increase strength to improve the patients ability to increase ease with ADls     10 min Neuromuscular Re-education:  [x]? ??  See flow sheet :   Rationale: increase strength, improve coordination, improve balance and increase proprioception  to improve the patients ability to increase ease with functional activities    With   [x] TE   [] TA   [] neuro   [] other: Patient Education: [x] Review HEP    [] Progressed/Changed HEP based on:   [] positioning   [] body mechanics   [] transfers   [] heat/ice application    [] other:      Other Objective/Functional Measures:   Increased step height to 6\" with step ups  No difficulty with exercises  Cont with slight tremors in left quads with LAQ due to weakness     Pain Level (0-10 scale) post treatment: 2/10    ASSESSMENT/Changes in Function:   Pt is making slow progress toward goals. Strength and AROM cont to improve in the right knee. Pt cont with moderate swelling in the knee. Ambulation is improving and pt reports that he mostly uses the [facility] in community and nothing around the house    Patient will continue to benefit from skilled PT services to modify and progress therapeutic interventions, address functional mobility deficits, address ROM deficits, address strength deficits, analyze and address soft tissue restrictions, analyze and cue movement patterns, analyze and modify body mechanics/ergonomics, assess and modify postural abnormalities, address imbalance/dizziness and instruct in home and community integration to attain remaining goals.      []  See Plan of Care  [x]  See progress note/recertification  []  See Discharge Summary Progress towards goals / Updated goals:  1. Patient will improve FOTO score to at least 39/100 to demonstrate improvement in functional mobility. 2. Patient will report \"little difficulty\" when asked on the FOTO questionnaire, \"How much difficulty do you have with any of your usual work, housework, or school activities? \" to demonstrate improved ease of completing household chores. 3. Patient will improve left knee AROM 0-100 degrees in order to increase ease of ambulation.   Progressing. 5/17/22  4.  Patient will improve left knee extension MMT to 5/5 in order to increase ease of stair negotiation    PLAN  []  Upgrade activities as tolerated     [x]  Continue plan of care  []  Update interventions per flow sheet       []  Discharge due to:_  []  Other:_      Lauryn Enriquez PTA 5/17/2022  6:43 AM    Future Appointments   Date Time Provider Dex [place]   5/17/2022  1:30 PM Rivas Palomino PTA [facility]   5/19/2022  1:30 PM Rivas Palomino PTA [facility]   5/20/2022  1:10 PM ESPERANZA Gunderson BS AMB   5/24/2022 11:15 AM Gal Strong, PT [facility]   5/26/2022 11:15 AM [facility]illy, [facility]   6/1/2022  2:15 PM Emilie Oreilly, PTA [facility]   6/3/2022  1:30 PM Dariusz Bass, PT [facility]   7/5/2022  1:45 PM MD AMY BraggMO BS AMB   10/18/2022  1:00 PM [facility] NURSE [place]   10/25/2022  1:00 PM Melissa Chinchilla MD [address]

## 2022-05-19 ENCOUNTER — HOSPITAL ENCOUNTER (OUTPATIENT)
Dept: PHYSICAL THERAPY | Age: 67
Discharge: HOME | End: 2022-05-19
Payer: MEDICARE

## 2022-05-19 PROCEDURE — 97110 THERAPEUTIC EXERCISES: CPT

## 2022-05-19 PROCEDURE — 97016 VASOPNEUMATIC DEVICE THERAPY: CPT

## 2022-05-19 PROCEDURE — 97112 NEUROMUSCULAR REEDUCATION: CPT

## 2022-05-19 NOTE — PROGRESS NOTES
PT DAILY TREATMENT NOTE     Patient Name: Eleonora Mendez  GTTM:  : 1955  [x]  Patient  Verified  Payor: [place] MEDICARE / Plan: [place] MEDICARE PART A & B / Product Type: Medicare /    In time:130  Out time:230  Total Treatment Time (min): 60  Visit #: 5 of     Medicare/BCBS Only   Total Timed Codes (min):  45 1:1 Treatment Time:  45       Treatment Area: Left knee pain [M25.562]    SUBJECTIVE  Pain Level (0-10 scale): 2-3/10  Any medication changes, allergies to medications, adverse drug reactions, diagnosis change, or new procedure performed?: [x] No    [] Yes (see summary sheet for update)  Subjective functional status/changes:   [] No changes reported  Pt stated that his knee is ok today and he has a little pain in the left low back    OBJECTIVE    Modality rationale: decrease inflammation and decrease pain to improve the patients ability to increase ease with ADls   Min Type Additional Details       []???? Estim:  []????Unatt       []????IFC  []????Premod                        []?? ??Other:  []????w/ice   []? ???w/heat  Position:  Location:       []???? Estim: []?? ?? Att    []????TENS instruct  []????NMES                    []?? ??Other:  []????w/US   []? ???w/ice   []? ???w/heat  Position:  Location:       []????  Traction: []???? Cervical       []????Lumbar                       []???? Prone          []????Supine                       []?? ?? Intermittent   []???? Continuous Lbs:  []???? before manual  []???? after manual       []????  Ultrasound: []???? Continuous   []???? Pulsed                           []????1MHz   []????3MHz W/cm2:  Location:       []????  Iontophoresis with dexamethasone         Location: []???? Take home patch   []???? In clinic       []????  Ice     []????  heat  []????  Ice massage  []????  Laser   []????  Anodyne Position:  Location:       []????  Laser with stim  []????  Other:  Position:  Location:     15 [x]? ???  Vasopneumatic Device    []????  Right     [x]? ???  Left  Pre-treatment girth: 17.5\"  Post-treatment girth:17\"  Measured at (location): joint line Pressure:       [x]? ??? lo []???? med []???? hi   Temperature: [x]???? lo []???? med []???? hi     [x]? ??? Skin assessment post-treatment:  [x]? ???intact []? ???redness- no adverse reaction    []? ???redness - adverse reaction:      35 min Therapeutic Exercise:  [x]? ??? See flow sheet :   Rationale: increase ROM and increase strength to improve the patients ability to increase ease with ADls     10 min Neuromuscular Re-education:  [x]? ???  See flow sheet :   Rationale: increase strength, improve coordination, improve balance and increase proprioception  to improve the patients ability to increase ease with functional activities    With   [x] TE   [] TA   [] neuro   [] other: Patient Education: [x] Review HEP    [] Progressed/Changed HEP based on:   [] positioning   [] body mechanics   [] transfers   [] heat/ice application    [] other:      Other Objective/Functional Measures:   Unable to perform lateral step ups  Added TRX squats    Pain Level (0-10 scale) post treatment: 2/10    ASSESSMENT/Changes in Function:   Pt is making slow progress toward goals. Pt cont with decreased strength and AROM in the left knee. Cont to use SPC with ambulation. Cont to report having difficulty with household chores and yard work. Patient will continue to benefit from skilled PT services to modify and progress therapeutic interventions, address functional mobility deficits, address ROM deficits, address strength deficits, analyze and address soft tissue restrictions, analyze and cue movement patterns, analyze and modify body mechanics/ergonomics, assess and modify postural abnormalities, address imbalance/dizziness and instruct in home and community integration to attain remaining goals.      []  See Plan of Care  [x]  See progress note/recertification  []  See Discharge Summary         Progress towards goals / Updated goals: 1. Patient will improve FOTO score to at least 39/100 to demonstrate improvement in functional mobility. 2. Patient will report \"little difficulty\" when asked on the FOTO questionnaire, \"How much difficulty do you have with any of your usual work, housework, or school activities? \" to demonstrate improved ease of completing household chores. 3. Patient will improve left knee AROM 0-100 degrees in order to increase ease of ambulation.   Progressing. 5/17/22  4.  Patient will improve left knee extension MMT to 5/5 in order to increase ease of stair negotiation    PLAN  []  Upgrade activities as tolerated     [x]  Continue plan of care  []  Update interventions per flow sheet       []  Discharge due to:_  []  Other:_      Kiki Marmolejo PTA 5/19/2022  6:54 AM    Future Appointments   Date Time Provider Dex [place]   5/19/2022  1:30 PM Lela Lopez, PTA [facility]   5/20/2022  1:10 PM ESPERANZA Christiansen BS AMB   5/24/2022 11:15 AM Avelino Carr, PT [facility]   5/26/2022 11:15 AM Daljit Doctor, PTA [facility]   6/1/2022  2:15 PM Daljit Doctor, PTA [facility]   6/3/2022  1:30 PM Richard Torres, PT [facility]   7/5/2022  1:45 PM Jamison Craven MD [facility] BS AMB   10/18/2022  1:00 PM [facility] NURSE [address] [place] [address]   10/25/2022  1:00 PM Vick Chinchilla MD [address] [place] [address]

## 2022-05-20 ENCOUNTER — OFFICE VISIT (OUTPATIENT)
Dept: ORTHOPEDIC SURGERY | Age: 67
End: 2022-05-20
Payer: MEDICARE

## 2022-05-20 VITALS — WEIGHT: 231 LBS | TEMPERATURE: 97.8 F | HEIGHT: 70 IN | BODY MASS INDEX: 33.07 KG/M2

## 2022-05-20 DIAGNOSIS — Z96.652 S/P REVISION OF TOTAL KNEE, LEFT: Primary | ICD-10-CM

## 2022-05-20 DIAGNOSIS — M25.562 LEFT KNEE PAIN, UNSPECIFIED CHRONICITY: ICD-10-CM

## 2022-05-20 PROCEDURE — 99024 POSTOP FOLLOW-UP VISIT: CPT | Performed by: PHYSICIAN ASSISTANT

## 2022-05-20 NOTE — PROGRESS NOTES
[address] [facility], [address] [facility]  [phone]           Patient: Keith Sanchez                MRN: [id number]       SSN: [id number]  YOB: 1955        AGE: 69 y.o. SEX: male  Body mass index is 33.15 kg/m². PCP: Ervin Gupta NP  05/20/22      This office note has been dictated. REVIEW OF SYSTEMS:  Constitutional: Negative for fever, chills, weight loss and malaise/fatigue. HENT: Negative. Eyes: Negative. Respiratory: Negative. Cardiovascular: Negative. Gastrointestinal: No bowel incontinence or constipation. Genitourinary: No bladder incontinence or saddle anesthesia. Skin: Negative. Neurological: Negative. Endo/Heme/Allergies: Negative. Psychiatric/Behavioral: Negative. Musculoskeletal: As per HPI above. Past Medical History:   Diagnosis Date    Arthritis     Chronic pain     knee and shoulder    DVT (deep venous thrombosis) ([facility] [address] 1992    post sx. R LEG    DVT (deep venous thrombosis) ([facility]) 2000    POST BACK SX. 2- LEFT LEG    GERD (gastroesophageal reflux disease)     Headache(784.0)     everyday    High cholesterol     Hypertension     Prostate cancer ([facility] [address] 2018    Pure hypercholesterolemia     Spinal stenosis     Thromboembolus ([facility])          Current Outpatient Medications:     metaxalone (Skelaxin) 800 mg tablet, Take 1 Tablet by mouth three (3) times daily.  Take as needed  Indications: muscle spasm, Disp: 90 Tablet, Rfl: 3    gabapentin (NEURONTIN) 100 mg capsule, Take 2 capsules at bedtime as directed  Indications: neuropathic pain, Disp: 180 Capsule, Rfl: 1    clotrimazole-betamethasone (LOTRISONE) 1-0.05 % lotion, Apply  to affected area two (2) times a day., Disp: 30 mL, Rfl: 0    allopurinoL (ZYLOPRIM) 100 mg tablet, Take 1 Tablet by mouth two (2) times a day., Disp: 60 Tablet, Rfl: 0    L gasseri/B bifidum/B longum (Jiankongbao Bionym PO), Take 1 Tablet by mouth nightly., Disp: , Rfl:     lansoprazole (PREVACID) 30 mg capsule, Take 30 mg by mouth daily. , Disp: , Rfl:     warfarin (COUMADIN) 5 mg tablet, TAKE 2 AND 1/2 TABLETS BY MOUTH DAILY OR AS DIRECTED (Patient taking differently: Take  by mouth daily. Patient takes 2  TABLETS BY MOUTH Daily or as directed), Disp: 75 Tab, Rfl: 0    lisinopril-hydroCHLOROthiazide (PRINZIDE, ZESTORETIC) 20-12.5 mg per tablet, TAKE 1 TABLET BY MOUTH DAILY (Patient taking differently: Take 1 Tab by mouth daily. TAKE 1 TABLET BY MOUTH DAILY), Disp: 90 Tab, Rfl: 1    montelukast (SINGULAIR) 10 mg tablet, TAKE 1 TABLET BY MOUTH EVERY DAY, Disp: 90 Tab, Rfl: 0    acetaminophen (TYLENOL) 500 mg tablet, Take 1,000 mg by mouth every six (6) hours as needed for Pain., Disp: , Rfl:     doxycycline (MONODOX) 100 mg capsule, Take 100 mg by mouth two (2) times a day. (Patient not taking: Reported on 5/20/2022), Disp: , Rfl:     methylPREDNISolone (MEDROL DOSEPACK) 4 mg tablet, Per dose pack instructions (Patient not taking: Reported on 4/20/2022), Disp: 1 Dose Pack, Rfl: 0    labetalol (NORMODYNE) 100 mg tablet, TAKE 1 TABLET BY MOUTH TWICE DAILY. STOP VERAPAMIL (Patient taking differently: Take  by mouth two (2) times a day.), Disp: 180 Tab, Rfl: 0    butalbital-acetaminophen-caff (FIORICET) -40 mg per capsule, 2 cap three times per day as needed for headache (Patient taking differently: Take 1 Capsule by mouth daily. 2 cap three times per day as needed for headache), Disp: 180 Cap, Rfl: 1    ALPRAZolam (XANAX) 0.5 mg tablet, Take one half(1/2) tab to one(1) tab by mouth at bedtime as needed for sleep (Patient taking differently: Take  by mouth nightly as needed.  Take one half(1/2) tab to one(1) tab by mouth at bedtime as needed for sleep), Disp: 30 Tab, Rfl: 0    Allergies   Allergen Reactions    Amoxicillin Itching    Augmentin [Amoxicillin-Pot Clavulanate] Itching    Chlorhexidine Unknown (comments)    Chlorhexidine Towelette Itching    Hibiclens [Chlorhexidine Gluconate] Itching    Milk Containing Products Diarrhea    Nsaids (Non-Steroidal Anti-Inflammatory Drug) Other (comments)     On blood thinner, contraindicated.  Penicillins Rash    Requip [Ropinirole] Nausea and Vomiting    Simvastatin Other (comments)       Social History     Socioeconomic History    Marital status:      Spouse name: Not on file    Number of children: Not on file    Years of education: Not on file    Highest education level: Not on file   Occupational History    Not on file   Tobacco Use    Smoking status: Never Smoker    Smokeless tobacco: Never Used   Vaping Use    Vaping Use: Never used   Substance and Sexual Activity    Alcohol use: No    Drug use: Never    Sexual activity: Not Currently   Other Topics Concern     Service Not Asked    Blood Transfusions Not Asked    Caffeine Concern Not Asked    Occupational Exposure Not Asked    Hobby Hazards Not Asked    Sleep Concern Not Asked    Stress Concern Not Asked    Weight Concern Not Asked    Special Diet Not Asked    Back Care Not Asked    Exercise Not Asked    Bike Helmet Not Asked    Seat Belt Not Asked    Self-Exams Not Asked   Social History Narrative    Not on file     Social Determinants of Health     Financial Resource Strain:     Difficulty of Paying Living Expenses: Not on file   Food Insecurity:     Worried About Running Out of Food in the Last Year: Not on file    Marley of Food in the Last Year: Not on file   Transportation Needs:     Lack of Transportation (Medical): Not on file    Lack of Transportation (Non-Medical):  Not on file   Physical Activity:     Days of Exercise per Week: Not on file    Minutes of Exercise per Session: Not on file   Stress:     Feeling of Stress : Not on file   Social Connections:     Frequency of Communication with Friends and Family: Not on file    Frequency of Social Gatherings with Friends and Family: Not on file    Attends Anabaptist Services: Not on file    Active Member of Clubs or Organizations: Not on file    Attends Club or Organization Meetings: Not on file    Marital Status: Not on file   Intimate Partner Violence:     Fear of Current or Ex-Partner: Not on file    Emotionally Abused: Not on file    Physically Abused: Not on file    Sexually Abused: Not on file   Housing Stability:     Unable to Pay for Housing in the Last Year: Not on file    Number of [place] in the Last Year: Not on file    Unstable Housing in the Last Year: Not on file       Past Surgical History:   Procedure Laterality Date    HX HEENT Right 09/11/2018    eye surgery, macular     HX KNEE REPLACEMENT Left 2018    HX LUMBAR LAMINECTOMY  1992    HX LUMBAR LAMINECTOMY  2000    HX ORTHOPAEDIC  06-25-12    Right foot with excision of bursa and adipose tissue from fifth metatarsal base by Dr. Marilin Moon Left 03/09/2022    left knee revision    HX PROSTATECTOMY  11/2018    HX ROTATOR CUFF REPAIR Right 01/28/2019    by Dr. Martin Thomas ARTHROSCOPY Left 12/2020    WORK RELATED INJURY         Patient seen evaluated today for his revision left knee replacement. He is now about 3-month status post surgery and doing quite well. He is pleased with the results of the knee replacement. His preoperative pain is improved. He saw little soreness in his knee and some swelling at times. He does continue on Coumadin. He has had no wound troubles. He denies any start up pain. There are no feelings of instability. He does continue with outpatient physical therapy. Patient denies recent fevers, chills, chest pain, SOB, or injuries. No recent systemic changes noted. A 12-point review of systems is performed today. Pertinent positives are noted. All other systems reviewed and otherwise are negative.     Physical exam: General: Alert and oriented x3, nad.  well-developed, well nourished. normal affect, AF. NC/AT, EOMI, neck supple, trachea midline, no JVD present. Breathing is non-labored. Examination of the lower extremity was pain-free range of motion the hips. There is no pain to palpation the trochanter bursa. Neck straight leg raise. Negative calf tenderness. Negative Homans. No signs of DVT present. The left knee reveals skin intact. There is no erythema or ecchymosis noted. There are no signs for infection or cellulitis present. Range of motion is full extension however does have that of 4 degree extensor lag without defects in the extensor mechanism. Flexion 110. Patella tracks nicely without rubs or crepitus noted. Assessment: Status post revision left knee replacement    Plan: At this point, the patient is doing well. He is instructed on continuation of physical therapy as well as a home exercise program to work on his quad strength. He denies any for analgesics. We will see him back in 3 months time for evaluation. He will call with any questions or concerns arise.               JR Willie GATES, PA-C, ATC

## 2022-05-24 ENCOUNTER — HOSPITAL ENCOUNTER (OUTPATIENT)
Dept: PHYSICAL THERAPY | Age: 67
Discharge: HOME | End: 2022-05-24
Payer: MEDICARE

## 2022-05-24 PROCEDURE — 97016 VASOPNEUMATIC DEVICE THERAPY: CPT

## 2022-05-24 PROCEDURE — 97110 THERAPEUTIC EXERCISES: CPT

## 2022-05-24 NOTE — PROGRESS NOTES
PT DAILY TREATMENT NOTE     Patient Name: Donta Luna  Date:2022  : 1955  [x]  Patient  Verified  Payor: [place] MEDICARE / Plan: [place] MEDICARE PART A & B / Product Type: Medicare /    In time:11:19  Out time: 12:14  Total Treatment Time (min): 55   Visit #: 6 of 12    Medicare/BCBS Only   Total Timed Codes (min):  40 1:1 Treatment Time:  38       Treatment Area: Left knee pain [M25.562]    SUBJECTIVE  Pain Level (0-10 scale): 3/10   Any medication changes, allergies to medications, adverse drug reactions, diagnosis change, or new procedure performed?: [x] No    [] Yes (see summary sheet for update)  Subjective functional status/changes:   [] No changes reported  Pt reports that he saw his physician assistance last week, and he was pleased with pt's progress. He has been having LBP and sciatic pain since he was DC from the hospital after his surgery, and he started a steroid dose pack and that's really been helping. He cannot remember the name of the steroid. His last day of taking the steroid is Thursday. Getting in and out of his car is getting better and ease of stairs is improving. Pt also reports increased ease of tub transfers. Pt reports 65-70% overall improvement since start of care. His main goal with therapy is to be able to walk longer and be more steady when walking on uneven surfaces, such as grass. He wants to return to hiking, at least on flat surfaces. His physician assistant told him he needed to work on quad strength. He continues to use the [facility] in the community but he is walking around the house without AD at times.           OBJECTIVE    Modality rationale: decrease inflammation and decrease pain to improve the patients ability to tolerate daily tasks    Min Type Additional Details    [] Estim:  []Unatt       []IFC  []Premod                        []Other:  []w/ice   []w/heat  Position:  Location:    [] Estim: []Att    []TENS instruct  []NMES                    []Other: []w/US   []w/ice   []w/heat  Position:  Location:    []  Traction: [] Cervical       []Lumbar                       [] Prone          []Supine                       []Intermittent   []Continuous Lbs:  [] before manual  [] after manual    []  Ultrasound: []Continuous   [] Pulsed                           []1MHz   []3MHz W/cm2:  Location:    []  Iontophoresis with dexamethasone         Location: [] Take home patch   [] In clinic    []  Ice     []  heat  []  Ice massage  []  Laser   []  Anodyne Position:  Location:    []  Laser with stim  []  Other:  Position:  Location:   15 [x]  Vasopneumatic Device    []  Right     [x]  Left  Pre-treatment girth: 17 1/2\"  Post-treatment girth: 17 3/8\"  Measured at (location): mid-patella  Pressure:       [x] lo [] med [] hi   Temperature: [x] lo [] med [] hi   [x] Skin assessment post-treatment:  [x]intact []redness- no adverse reaction    []redness - adverse reaction:     40 min Therapeutic Exercise:  [x] See flow sheet :   Rationale: increase ROM, increase strength, improve balance and increase proprioception to improve the patients ability to improve ease/safety with ambulation and daily tasks          With   [x] TE   [] TA   [] neuro   [] other: Patient Education: [x] Review HEP    [] Progressed/Changed HEP based on:   [] positioning   [] body mechanics   [] transfers   [] heat/ice application    [x] other: goal assessment, discussion of progress in therapy and remaining deficits, discussion of therapy plan and advised patient to schedule for 4 more weeks; educated patient regarding a walking program       Other Objective/Functional Measures:     Subjective information obtained during bike  TTP along left ITB     Mild shaking evident with LAQ with 2#    Challenged with initiation of SL leg press with 30#     Left knee AROM 0-94*   Left Knee MMT:  Extension 4-/5  Flexion 4/5      Pain Level (0-10 scale) post treatment: 2/10     ASSESSMENT/Changes in Function:     Pt is making slow, steady progress towards updated goals in therapy. He reports 65-70% improvement since start of care, and his main goals are to increase ambulatory tolerance and increase stability with ambulating on uneven surfaces. Educated patient regarding a walking program and progress as tolerated. Continued glute strength deficits is likely contributing to TFL compensation and ITB symptoms. Will continue to address strength, ROM, flexibility, balance, and functional mobility deficits in order to improve ease/safety with ambulation and daily tasks. Patient will continue to benefit from skilled PT services to modify and progress therapeutic interventions, address functional mobility deficits, address ROM deficits, address strength deficits, analyze and address soft tissue restrictions, analyze and cue movement patterns, analyze and modify body mechanics/ergonomics, assess and modify postural abnormalities, address imbalance/dizziness and instruct in home and community integration to attain remaining goals. Progress towards goals / Updated goals:  1. Patient will improve FOTO score to at least 39/100 to demonstrate improvement in functional mobility. 2. Patient will report \"little difficulty\" when asked on the FOTO questionnaire, \"How much difficulty do you have with any of your usual work, housework, or school activities? \" to demonstrate improved ease of completing household chores. 3. Patient will improve left knee AROM 0-100 degrees in order to increase ease of ambulation.   Progressing. 0-94* 5/24/22 (7-95* last progress note)   4.  Patient will improve left knee extension MMT to 5/5 in order to increase ease of stair negotiation   5/24/22  Left Knee MMT:  Extension 4-/5  Flexion 4/5     PLAN  [x]  Upgrade activities as tolerated     [x]  Continue plan of care  []  Update interventions per flow sheet       []  Discharge due to:_  []  Other:_      Kole Osman, PT 5/24/2022  11:24 AM    Future Appointments   Date Time Provider [place]   5/26/2022 11:15 AM Daniela Pretty, [facility] [address]   6/1/2022  2:15 PM Godfrey Myers, [place] [facility] [address]   6/3/2022  1:30 PM Efe Hoang, PT [facility] [address]   7/5/2022  1:45 PM Santosh Noonan MD [facility]   8/26/2022  1:10 PM Naz Lozano PA-C [facility]   10/18/2022  1:00 PM [facility] NURSE [place]   10/25/2022  1:00 PM Amor, Yasmin Simental MD [id number]

## 2022-05-26 ENCOUNTER — HOSPITAL ENCOUNTER (OUTPATIENT)
Dept: PHYSICAL THERAPY | Age: 67
Discharge: HOME | End: 2022-05-26
Payer: MEDICARE

## 2022-05-26 PROCEDURE — 97016 VASOPNEUMATIC DEVICE THERAPY: CPT

## 2022-05-26 PROCEDURE — 97110 THERAPEUTIC EXERCISES: CPT

## 2022-05-26 NOTE — PROGRESS NOTES
PT DAILY TREATMENT NOTE     Patient Name: Robert Osuna  Date:2022  : 1955  [x]  Patient  Verified  Payor: [place] MEDICARE / Plan: [place] MEDICARE PART A & B / Product Type: Medicare /    In time:1115  Out time:1215  Total Treatment Time (min): 60  Visit #: 7 of 12    Medicare/BCBS Only   Total Timed Codes (min):  45 1:1 Treatment Time:  45       Treatment Area: Left knee pain [M25.562]    SUBJECTIVE  Pain Level (0-10 scale): 3-4/10  Any medication changes, allergies to medications, adverse drug reactions, diagnosis change, or new procedure performed?: [x] No    [] Yes (see summary sheet for update)  Subjective functional status/changes:   [] No changes reported  Pt stated that he has a little more pain today, probably due to the weather. OBJECTIVE    Modality rationale: decrease inflammation and decrease pain to improve the patients ability to tolerate daily tasks    Min Type Additional Details      []? Estim:  []? Unatt       []? IFC  []? Premod                        []?Other:  []?w/ice   []?w/heat  Position:  Location:      []? Estim: []? Att    []? TENS instruct  []? NMES                    []?Other:  []?w/US   []?w/ice   []?w/heat  Position:  Location:      []? Traction: []? Cervical       []? Lumbar                       []? Prone          []? Supine                       []?Intermittent   []? Continuous Lbs:  []? before manual  []? after manual      []? Ultrasound: []? Continuous   []? Pulsed                           []? 1MHz   []? 3MHz W/cm2:  Location:      []? Iontophoresis with dexamethasone         Location: []? Take home patch   []? In clinic      []? Ice     []?  heat  []? Ice massage  []? Laser   []? Anodyne Position:  Location:      []? Laser with stim  []? Other:  Position:  Location:    15 [x]? Vasopneumatic Device    []? Right     [x]?   Left  Pre-treatment girth: 17 1/2\"  Post-treatment girth: 17 3/8\"  Measured at (location): mid-patella  Pressure:       [x]? lo []? med []? hi Temperature: [x]? lo []? med []? hi    [x]? Skin assessment post-treatment:  [x]? intact []? redness- no adverse reaction    []? redness - adverse reaction:      45 min Therapeutic Exercise:  [x]? See flow sheet :   Rationale: increase ROM, increase strength, improve balance and increase proprioception to improve the patients ability to improve ease/safety with ambulation and daily tasks            With   [x] TE   [] TA   [] neuro   [] other: Patient Education: [x] Review HEP    [] Progressed/Changed HEP based on:   [] positioning   [] body mechanics   [] transfers   [] heat/ice application    [] other:      Other Objective/Functional Measures:   Had significant difficulty with side step ups  No complaint of increased pain during session   Mild momentary shaking in left quad with LAQ  Slight difficulty with leg press SL     Pain Level (0-10 scale) post treatment: 2/10    ASSESSMENT/Changes in Function:   Pt is making slow progress toward goals. Strength in left quad is slowly improving. Pt cont to ambulate with SPC with minor limp. Cont with decreased strength and AROM in the left knee. Cont with decreased standing and walking tolerance    Patient will continue to benefit from skilled PT services to modify and progress therapeutic interventions, address functional mobility deficits, address ROM deficits, address strength deficits, analyze and address soft tissue restrictions, analyze and cue movement patterns, analyze and modify body mechanics/ergonomics, assess and modify postural abnormalities, address imbalance/dizziness and instruct in home and community integration to attain remaining goals. []  See Plan of Care  [x]  See progress note/recertification  []  See Discharge Summary         Progress towards goals / Updated goals:  1. Patient will improve FOTO score to at least 39/100 to demonstrate improvement in functional mobility.    2. Patient will report \"little difficulty\" when asked on the FOTO questionnaire, \"How much difficulty do you have with any of your usual work, housework, or school activities? \" to demonstrate improved ease of completing household chores. 3. Patient will improve left knee AROM 0-100 degrees in order to increase ease of ambulation.   Progressing. 0-94* 5/24/22 (7-95* last progress note)   4.  Patient will improve left knee extension MMT to 5/5 in order to increase ease of stair negotiation   5/24/22  Left Knee MMT:  Extension 4-/5  Flexion 4/5     PLAN  []  Upgrade activities as tolerated     [x]  Continue plan of care  []  Update interventions per flow sheet       []  Discharge due to:_  []  Other:_      Stepan Tobias, B[place]GIT 5/26/2022  6:43 AM    Future Appointments   Date Time Provider Dex [place]   5/26/2022 11:15 AM Josef Hagen, PTA [facility]   6/1/2022  2:15 PM Godfrey Hebert, PT [facility]   6/3/2022  1:30 PM Maru Anne, PT [facility]   6/7/2022  2:15 PM Lexie Torres, PTA [facility]   6/9/2022  1:30 PM Tony MUNIZ, PT [facility]   6/14/2022  3:00 PM Lexie Lopezman, PTA [facility]   6/16/2022  1:30 PM Owen Melissa, PTA [facility]   6/21/2022  2:15 PM Owen Melissa, PTA [facility]   6/23/2022  2:15 PM Maru Anne, PT [facility]   6/28/2022  2:15 PM Owen Melissa, PTA [facility]   6/30/2022  2:15 PM Lexie Torres, PTA [facility]   7/5/2022  1:45 PM George Bateman MD [facility]   8/26/2022  1:10 PM Lorena Amaya PA-C [facility]   10/18/2022  1:00 PM [facility] NURSE [place]   10/25/2022  1:00 PM Given, Allen Huizar MD [address]

## 2022-06-01 ENCOUNTER — HOSPITAL ENCOUNTER (OUTPATIENT)
Dept: PHYSICAL THERAPY | Age: 67
Discharge: HOME | End: 2022-06-01
Payer: MEDICARE

## 2022-06-01 PROCEDURE — 97110 THERAPEUTIC EXERCISES: CPT

## 2022-06-01 PROCEDURE — 97530 THERAPEUTIC ACTIVITIES: CPT

## 2022-06-01 NOTE — PROGRESS NOTES
[facility] COMPANY OF  [facility] ERIC  [address] [facility]  [phone] fax    Continued Plan of Care/ Re-certification for Physical Therapy Services    Patient name: Rupali López Start of Care: 2022   Referral source: Lori Bahena : 1955   Medical/Treatment Diagnosis: Left knee pain [M25.562]  Payor: [place] MEDICARE / Plan: VA MEDICARE PART A & B / Product Type: Medicare /  Onset Date: 3/9/22     Prior Hospitalization: see medical history Provider#: [id number]   Medications: Verified on Patient Summary List    Comorbidities: HTN, arthritis, history of DVT, history of cancer  Prior Level of Function: Ind with ambulation, hiking, hunting, fishing, Ind with ADLs        Visits from Start of Care: 14    Missed Visits: 0    The Plan of Care and following information is based on the patient's current status:  Goal: Patient will improve FOTO score to at least 39/100 to demonstrate improvement in functional mobility. Status at last note/certification:   Current Status: met 52/100    Goal: Patient will report \"little difficulty\" when asked on the FOTO questionnaire, \"How much difficulty do you have with any of your usual work, housework, or school activities? \" to demonstrate improved ease of completing household chores. Status at last note/certification: extreme difficulty or unable to perform  Current Status: met \"a little bit of difficulty\"    Goal: Patient will improve left knee AROM 0-100 degrees in order to increase ease of ambulation. Status at last note/certification: 2-33 degrees  Current Status: met 0-106 degrees    Goal: Patient will improve left knee extension MMT to 5/5 in order to increase ease of stair negotiation.   Status at last note/certification: flexion 5/5; extension 4+/5  Current Status: not met 4/5    Key functional changes: FOTO 52/100; left knee AROM 0-106 degrees; improved ease of car transfers and stairs; ambulating with SPC Problems/ barriers to goal attainment: none     Problem List: pain affecting function, decrease ROM, decrease strength, edema affecting function, impaired gait/ balance, decrease ADL/ functional abilitiies, decrease activity tolerance and decrease flexibility/ joint mobility    Treatment Plan: Therapeutic exercise, Therapeutic activities, Neuromuscular re-education, Physical agent/modality, Gait/balance training, Manual therapy, Patient education, Self Care training, Functional mobility training, Home safety training and Stair training     Patient Goal (s) has been updated and includes: \"increase my quad strength, be able to cut my own grass and work on some balance so I can go hiking\"    Goals for this certification period to be accomplished in 4 weeks:  1. Pt will increase FOTO score to at least 55/100 to demonstrate improvement in functional mobility. 2. Pt will report \"a little bit of difficulty\" when asked on the FOTO questionnaire, \"How much difficulty do you have performing heavy activities around your home? \" to demonstrate improved ease of completing household chores like cutting his grass. 3. Pt will be able to complete 10 SLR without extensor lag to improve ease of ambulation without buckling. 4. Pt will increase left PF MMT to 4/5 for improved ease of negotiating stairs and mowing his grass. 5. Pt will be able to perform SLS on the left for 5 seconds for improved balance to assist with decreased fall risk with ambulation. Frequency / Duration: Patient to be seen 2 times per week for 4 weeks:    Assessment / Recommendations: Mr. Bradley Arora is making slow, steady progress towards updated goals in therapy. He continues to demonstrate an extensor lag with his SLR indicating decreased TKE strength needed to reduce buckling with ambulation. He improved his FOTO score to 52/100 indicating improvement in functional mobility but has not yet been able to return to cutting his grass.  He demonstrates 4/5 knee strength but only 2+/5 left PF strength. He is unable to maintain left SLS needed to improve balance for decreased fall risk and ability to progress to ambulating without AD or return to hobbies like hiking. His left knee AROM did improve to 0-106 degrees and he has been compliant with all therapy recommendations and strengthening. Skilled PT remains medically necessary to progress left LE strength and balance for decreased fall risk and improved ease of ambulating without AD, cutting his grass and improving ease of transfers/stairs. Certification Period: 6/2/2022 to 7/1/2022    Karolina Novak, PTA 6/1/2022 12:41 PM    ________________________________________________________________________  I certify that the above Therapy Services are being furnished while the patient is under my care. I agree with the treatment plan and certify that this therapy is necessary. [] I have read the above and request that my patient continue as recommended.   [] I have read the above report and request that my patient continue therapy with the following changes/special instructions: _______________________________________  [] I have read the above report and request that my patient be discharged from therapy    Physician's Signature:____________Date:_________TIME:________     Ginna Nieves PA-C  ** Signature, Date and Time must be completed for valid certification **    Please sign and return to [facility] COMPANY OF MARY ANDRADE  [address] [place] [address]  [phone] fax

## 2022-06-01 NOTE — PROGRESS NOTES
PT DAILY TREATMENT NOTE     Patient Name: Amy Smoker  Date:2022  : 1955  [x]  Patient  Verified  Payor: VA MEDICARE / Plan: VA MEDICARE PART A & B / Product Type: Medicare /    In time: 2:19 Out time: 3:32  Total Treatment Time (min): 73  Visit #: 8 of 12    Medicare/BCBS Only   Total Timed Codes (min): 63  1:1 Treatment Time:  63       Treatment Area: Left knee pain [M25.562]    SUBJECTIVE  Pain Level (0-10 scale): 3/10  Any medication changes, allergies to medications, adverse drug reactions, diagnosis change, or new procedure performed?: [x] No    [] Yes (see summary sheet for update)  Subjective functional status/changes:   [] No changes reported  Pt reports a fall last Friday evening trying to step over a large water puddle with too big a step and when he brought his right leg forward his left knee buckled and his son caught him and helped him up. He notes some increase in soreness the remaining of the day but is back to baseline now. Pt reports his goals are increased quad strength, be able to cut grass and some balance for hiking. OBJECTIVE    Modality rationale: decrease inflammation and decrease pain to improve the patients ability to tolerate daily tasks    Min Type Additional Details      []? Estim:  []? Unatt       []? IFC  []? Premod                        []?Other:  []?w/ice   []?w/heat  Position:  Location:      []? Estim: []? Att    []? TENS instruct  []? NMES                    []?Other:  []?w/US   []?w/ice   []?w/heat  Position:  Location:      []? Traction: []? Cervical       []? Lumbar                       []? Prone          []? Supine                       []?Intermittent   []? Continuous Lbs:  []? before manual  []? after manual      []? Ultrasound: []? Continuous   []? Pulsed                           []? 1MHz   []? 3MHz W/cm2:  Location:      []? Iontophoresis with dexamethasone         Location: []? Take home patch   []? In clinic     10 [x]? Ice     []?  heat  []?   Ice massage  []? Laser   []? Anodyne Position: long sit  Location: left knee      []? Laser with stim  []? Other:  Position:  Location:     []? Vasopneumatic Device    []? Right     [x]? Left  Pre-treatment girth:   Post-treatment girth:   Measured at (location):   Pressure:       [x]? lo []? med []? hi   Temperature: [x]? lo []? med []? hi    [x]? Skin assessment post-treatment:  [x]? intact []? redness- no adverse reaction    []? redness - adverse reaction:      43 min Therapeutic Exercise:  [x]? See flow sheet :   Rationale: increase ROM, increase strength, improve balance and increase proprioception to improve the patients ability to improve ease/safety with ambulation and daily tasks      20 min Therapeutic Activity:  [x]? See flow sheet : goal reassessment; FOTO   Rationale: increase ROM, increase strength, improve balance and increase proprioception to improve the patients ability to improve ease/safety with ambulation and daily tasks            With   [x] TE   [] TA   [] neuro   [] other: Patient Education: [x] Review HEP    [] Progressed/Changed HEP based on:   [] positioning   [] body mechanics   [] transfers   [] heat/ice application    [] other:      Other Objective/Functional Measures: FOTO: 52/100  Left knee AROM: 0-106 degrees  Left knee extension MMT: 4/5  Left knee flexion MMT: 4/5  Extensor lag with SLR  Left SLS- unable to perform without UE assist  Left PF 2+/5  Progressed HEP for home (SAQ, SLS, PF with TB)  Unable to perform SLS HR on the left  Added rich bar push to simulate     Pain Level (0-10 scale) post treatment: 2-3/10    ASSESSMENT/Changes in Function: See Recertification.     Patient will continue to benefit from skilled PT services to modify and progress therapeutic interventions, address functional mobility deficits, address ROM deficits, address strength deficits, analyze and address soft tissue restrictions, analyze and cue movement patterns, analyze and modify body mechanics/ergonomics, assess and modify postural abnormalities, address imbalance/dizziness and instruct in home and community integration to attain remaining goals. []  See Plan of Care  [x]  See progress note/recertification  []  See Discharge Summary         Progress towards goals / Updated goals:  1. Patient will improve FOTO score to at least 39/100 to demonstrate improvement in functional mobility. Met  2. Patient will report \"little difficulty\" when asked on the FOTO questionnaire, \"How much difficulty do you have with any of your usual work, housework, or school activities? \" to demonstrate improved ease of completing household chores. Met  3. Patient will improve left knee AROM 0-100 degrees in order to increase ease of ambulation.   Progressing. 0-94* 5/24/22 (7-95* last progress note)  Left knee AROM: 0-106 degrees   4.  Patient will improve left knee extension MMT to 5/5 in order to increase ease of stair negotiation   5/24/22  Left Knee MMT:  Extension 4-/5  Flexion 4/5  Left knee extension MMT: 4/5  Left knee flexion MMT: 4/5     PLAN  [x]  Upgrade activities as tolerated     [x]  Continue plan of care  []  Update interventions per flow sheet       []  Discharge due to:_  []  Other:_      Gagan Marie PTA 6/1/2022  6:43 AM    Future Appointments   Date Time Provider [place]   6/1/2022  2:15 PM Rylie Javier, PTA [facility]   6/3/2022  1:30 PM Colleen Mcghee PT [facility]   6/7/2022  2:15 PM Rylie Javier, PTA [facility]   6/9/2022  1:30 PM Marcus Walker, PT [id number]   6/14/2022  3:00 PM Rylie Javier, PTA [facility]   6/16/2022  1:30 PM Rylie Javier, PTA [facility]   6/21/2022  2:15 PM Rylie Javier, PTA [facility]   6/23/2022  2:15 PM Colleen Mcghee, PT [facility]   6/28/2022  2:15 PM Rylie Javier, PTA [facility]   6/30/2022  2:15 PM Rylie Javier, PTA [facility]   7/5/2022  1:45 PM Karolina Lees MD [facility]   8/26/2022  1:10 PM Nicky Bowen PA-C [facility] AM[facility]   10/18/2022  1:00 PM [facility] NURSE [facility] ROX[facility]NE [facility]   10/25/2022  1:00 PM Given, MD [place]

## 2022-06-03 ENCOUNTER — HOSPITAL ENCOUNTER (OUTPATIENT)
Dept: PHYSICAL THERAPY | Age: 67
Discharge: HOME | End: 2022-06-03
Payer: MEDICARE

## 2022-06-03 PROCEDURE — 97016 VASOPNEUMATIC DEVICE THERAPY: CPT

## 2022-06-03 PROCEDURE — 97110 THERAPEUTIC EXERCISES: CPT

## 2022-06-03 PROCEDURE — 97112 NEUROMUSCULAR REEDUCATION: CPT

## 2022-06-03 NOTE — PROGRESS NOTES
PT DAILY TREATMENT NOTE     Patient Name: Nazia Gee  Date:6/3/2022  : 1955  [x]  Patient  Verified  Payor: VA MEDICARE / Plan: VA MEDICARE PART A & B / Product Type: Medicare /    In time:130  Out time:225  Total Treatment Time (min): 55  Visit #: 1 of 8    Medicare/BCBS Only   Total Timed Codes (min):  40 1:1 Treatment Time:  40       Treatment Area: Left knee pain [M25.562]    SUBJECTIVE  Pain Level (0-10 scale): 3/10  Any medication changes, allergies to medications, adverse drug reactions, diagnosis change, or new procedure performed?: [x] No    [] Yes (see summary sheet for update)  Subjective functional status/changes:   [] No changes reported  Pt stated that he is still having some pain in the middle knee    OBJECTIVE    Modality rationale: decrease inflammation and decrease pain to improve the patients ability to increase ease with ADLs   Min Type Additional Details    [] Estim:  []Unatt       []IFC  []Premod                        []Other:  []w/ice   []w/heat  Position:  Location:    [] Estim: []Att    []TENS instruct  []NMES                    []Other:  []w/US   []w/ice   []w/heat  Position:  Location:    []  Traction: [] Cervical       []Lumbar                       [] Prone          []Supine                       []Intermittent   []Continuous Lbs:  [] before manual  [] after manual    []  Ultrasound: []Continuous   [] Pulsed                           []1MHz   []3MHz W/cm2:  Location:    []  Iontophoresis with dexamethasone         Location: [] Take home patch   [] In clinic    []  Ice     []  heat  []  Ice massage  []  Laser   []  Anodyne Position:  Location:    []  Laser with stim  []  Other:  Position:  Location:   15 []  Vasopneumatic Device    []  Right     []  Left  Pre-treatment girth: 17 1/2\"  Post-treatment girth: 17 3/8\"  Measured at (location): mid-patella  Pressure:       [x] lo [] med [] hi   Temperature: [x] lo [] med [] hi   [x] Skin assessment post-treatment: [x]intact []redness- no adverse reaction    []redness - adverse reaction:     30 min Therapeutic Exercise:  [x] See flow sheet :   Rationale: increase ROM and increase strength to improve the patients ability to increase ease with ADls     10 min Neuromuscular Re-education:  [x]  See flow sheet :   Rationale: increase strength, improve coordination, improve balance and increase proprioception  to improve the patients ability to improve gait and decrease fall risk    With   [x] TE   [] TA   [] neuro   [] other: Patient Education: [x] Review HEP    [] Progressed/Changed HEP based on:   [] positioning   [] body mechanics   [] transfers   [] heat/ice application    [] other:      Other Objective/Functional Measures:   Increased step to 8\"  No LOB with static balance  No complaint of increased pain during session     Pain Level (0-10 scale) post treatment: 2/10    ASSESSMENT/Changes in Function:   Pt is making good progress toward goals. Strength and AROM cont to improve in the left knee. Cont with decreased ability to maintain balance in SLS. Cont with decreased standing and walking tolerance. Patient will continue to benefit from skilled PT services to modify and progress therapeutic interventions, address functional mobility deficits, address ROM deficits, address strength deficits, analyze and address soft tissue restrictions, analyze and cue movement patterns, analyze and modify body mechanics/ergonomics, assess and modify postural abnormalities, address imbalance/dizziness and instruct in home and community integration to attain remaining goals. []  See Plan of Care  [x]  See progress note/recertification  []  See Discharge Summary         Progress towards goals / Updated goals:  1. Pt will increase FOTO score to at least 55/100 to demonstrate improvement in functional mobility.    2. Pt will report \"a little bit of difficulty\" when asked on the FOTO questionnaire, \"How much difficulty do you have performing heavy activities around your home? \" to demonstrate improved ease of completing household chores like cutting his grass. 3. Pt will be able to complete 10 SLR without extensor lag to improve ease of ambulation without buckling. 4. Pt will increase left PF MMT to 4/5 for improved ease of negotiating stairs and mowing his grass. 5. Pt will be able to perform SLS on the left for 5 seconds for improved balance to assist with decreased fall risk with ambulation.      PLAN  []  Upgrade activities as tolerated     [x]  Continue plan of care  []  Update interventions per flow sheet       []  Discharge due to:_  []  Other:_      Ramon Faria, PTA 6/3/2022  1:39 PM    Future Appointments   Date Time Provider Dex [place]   6/7/2022  2:15 PM Jessica Gordon, PTA [facility]   6/9/2022  1:30 PM Duaine Duane, PT [facility]LEUX[facility]   6/14/2022  3:00 PM Jessica Gordon, PTA [facility]   6/16/2022  1:30 PM Jessica Gordon, PTA [facility]   6/21/2022  2:15 PM Jessica Gordon, PTA [facility]   6/23/2022  2:15 PM Jami Drake, PT [facility]   6/28/2022  2:15 PM Jessica Gordon, PTA [facility]   6/30/2022  2:15 PM Jessica Gordon, PTA [facility]   7/5/2022  1:45 PM Miranda King MD [facility] BS AMB   8/26/2022  1:10 PM ESPERANZA Mcknight BS AMB   10/18/2022  1:00 PM Roverto Gonsalo   10/25/2022  1:00 PM Ricardo Chinchilla MD [place]

## 2022-06-07 ENCOUNTER — HOSPITAL ENCOUNTER (OUTPATIENT)
Dept: PHYSICAL THERAPY | Age: 67
Discharge: HOME | End: 2022-06-07
Payer: MEDICARE

## 2022-06-07 PROCEDURE — 97016 VASOPNEUMATIC DEVICE THERAPY: CPT

## 2022-06-07 PROCEDURE — 97110 THERAPEUTIC EXERCISES: CPT

## 2022-06-07 NOTE — PROGRESS NOTES
PT DAILY TREATMENT NOTE     Patient Name: Anju Leung  Date:2022  : 1955  [x]  Patient  Verified  Payor: VA MEDICARE / Plan: VA MEDICARE PART A & B / Product Type: Medicare /    In time: 2:18  Out time: 3:25  Total Treatment Time (min): 79  Visit #: 2 of 8    Medicare/BCBS Only   Total Timed Codes (min):  52 1:1 Treatment Time:  45       Treatment Area: Left knee pain [M25.562]    SUBJECTIVE  Pain Level (0-10 scale): 3/10  Any medication changes, allergies to medications, adverse drug reactions, diagnosis change, or new procedure performed?: [x] No    [] Yes (see summary sheet for update)  Subjective functional status/changes:   [] No changes reported  Pt reports after his last session with me he was feeling the best he has felt in a while. He states inner knee pain today but was doing more walking around the store. He is trying to do exercises at home. He would like to be able to go fishing on a boat but knows he needs more stability.       OBJECTIVE    Modality rationale: decrease inflammation and decrease pain to improve the patients ability to increase ease with ADLs   Min Type Additional Details    [] Estim:  []Unatt       []IFC  []Premod                        []Other:  []w/ice   []w/heat  Position:  Location:    [] Estim: []Att    []TENS instruct  []NMES                    []Other:  []w/US   []w/ice   []w/heat  Position:  Location:    []  Traction: [] Cervical       []Lumbar                       [] Prone          []Supine                       []Intermittent   []Continuous Lbs:  [] before manual  [] after manual    []  Ultrasound: []Continuous   [] Pulsed                           []1MHz   []3MHz W/cm2:  Location:    []  Iontophoresis with dexamethasone         Location: [] Take home patch   [] In clinic    []  Ice     []  heat  []  Ice massage  []  Laser   []  Anodyne Position:  Location:    []  Laser with stim  []  Other:  Position:  Location:   15 [x]  Vasopneumatic Device    [] Right     [x]  Left  Pre-treatment girth: 17\"  Post-treatment girth: 17\"  Measured at (location): mid-patella  Pressure:       [x] lo [] med [] hi   Temperature: [x] lo [] med [] hi   [x] Skin assessment post-treatment:  [x]intact []redness- no adverse reaction    []redness - adverse reaction:     52 min Therapeutic Exercise:  [x] See flow sheet :   Rationale: increase ROM and increase strength to improve the patients ability to increase ease with ADls     With   [x] TE   [] TA   [] neuro   [] other: Patient Education: [x] Review HEP    [] Progressed/Changed HEP based on:   [] positioning   [] body mechanics   [] transfers   [] heat/ice application    [] other:      Other Objective/Functional Measures:   TTP pes anserine  Left hip IR 3+/5  Left hip ER 4/5  Added strengthening per weakness  Decreased pes anserine activation  Unable to perform full LAQ due to loss of TKE strength  Reviewed quad sets for TKE strengthening and educated to focus on this at home  Discussed adding some balance interventions to aide in stability and return to fishing    Pain Level (0-10 scale) post treatment: 2/10    ASSESSMENT/Changes in Function: Pt progressing with global functional mobility but needs strengthening to knee accessory muscles for improved tibial IR/ER during open and closed chain activities. He continues to lack TKE causing a quad lag and buckling in weightbearing. He has not been able to return to fishing on a boat due to instability and continues to struggle with push/pull for mowing. He has been working on exercises at home.      Patient will continue to benefit from skilled PT services to modify and progress therapeutic interventions, address functional mobility deficits, address ROM deficits, address strength deficits, analyze and address soft tissue restrictions, analyze and cue movement patterns, analyze and modify body mechanics/ergonomics, assess and modify postural abnormalities, address imbalance/dizziness and instruct in home and community integration to attain remaining goals. []  See Plan of Care  [x]  See progress note/recertification  []  See Discharge Summary         Progress towards goals / Updated goals:  1. Pt will increase FOTO score to at least 55/100 to demonstrate improvement in functional mobility. 2. Pt will report \"a little bit of difficulty\" when asked on the FOTO questionnaire, \"How much difficulty do you have performing heavy activities around your home? \" to demonstrate improved ease of completing household chores like cutting his grass. 3. Pt will be able to complete 10 SLR without extensor lag to improve ease of ambulation without buckling. 4. Pt will increase left PF MMT to 4/5 for improved ease of negotiating stairs and mowing his grass. 5. Pt will be able to perform SLS on the left for 5 seconds for improved balance to assist with decreased fall risk with ambulation.      PLAN  [x]  Upgrade activities as tolerated     [x]  Continue plan of care  []  Update interventions per flow sheet       []  Discharge due to:_  []  Other:_      Michelle Gonzalez PTA 6/7/2022  1:39 PM    Future Appointments   Date Time Provider Dex [place]   6/7/2022  2:15 PM Андрей Pappas PTA [facility]   6/9/2022  1:30 PM Godfrey Phillips, PT LATIEQ[facility]   6/14/2022  3:00 PM Андрей Pappas PTA [facility]   6/16/2022  1:30 PM Андрей Pappas PTA [facility]   6/21/2022  2:15 PM Андрей Pappas PTA [facility]   6/23/2022  2:15 PM Todd Blackwell, PT [facility]   6/28/2022  2:15 PM Андрей Pappas PTA [facility]   6/30/2022  2:15 PM Андрей Pappas, PTA [facility]   7/5/2022  1:45 PM Kodak Singh MD [facility] BS AMB   8/26/2022  1:10 PM Delphine Chen PA-C VS BS AMB   10/18/2022  1:00 PM [facility]   10/25/2022  1:00 PM Antoinette Chinchilla MD [id number] [facility]

## 2022-06-09 ENCOUNTER — HOSPITAL ENCOUNTER (OUTPATIENT)
Dept: PHYSICAL THERAPY | Age: 67
Discharge: HOME | End: 2022-06-09
Payer: MEDICARE

## 2022-06-09 PROCEDURE — 97110 THERAPEUTIC EXERCISES: CPT

## 2022-06-09 NOTE — PROGRESS NOTES
PT DAILY TREATMENT NOTE     Patient Name: Tanja Mccormick  Date:2022  : 1955  [x]  Patient  Verified  Payor: [place] MEDICARE / Plan: [place] MEDICARE PART A & B / Product Type: Medicare /    In time:1:36P Out time:2:31P  Total Treatment Time (min): 54  Visit #: 3 of 8    Medicare/BCBS Only   Total Timed Codes (min):  45 1:1 Treatment Time: 35       Treatment Area: Left knee pain [M25.562]    SUBJECTIVE  Pain Level (0-10 scale): 3/10  Any medication changes, allergies to medications, adverse drug reactions, diagnosis change, or new procedure performed?: [x] No    [] Yes (see summary sheet for update)  Subjective functional status/changes:   [] No changes reported    Patient reports his knee is not bad today. He felt pretty good after last visit. He felt his leg was moving better. He feels better going up and down his 3 steps at home and has been practicing using his cane about half the time with this and this is getting better. He feels better clearing his left foot. Walking is still challenging causing pain in his inner knee; he was in the store 25-35 min. He has only min pain riding in car. He had a fall 2 weeks ago but his son was with him so he went down gently.     OBJECTIVE    Modality rationale: decrease inflammation and decrease pain to improve the patients ability to perform ADLs with increased ease   Min Type Additional Details    [] Estim:  []Unatt       []IFC  []Premod                        []Other:  []w/ice   []w/heat  Position:  Location:    [] Estim: []Att    []TENS instruct  []NMES                    []Other:  []w/US   []w/ice   []w/heat  Position:  Location:    []  Traction: [] Cervical       []Lumbar                       [] Prone          []Supine                       []Intermittent   []Continuous Lbs:  [] before manual  [] after manual    []  Ultrasound: []Continuous   [] Pulsed                           []1MHz   []3MHz W/cm2:  Location:    []  Iontophoresis with dexamethasone Location: [] Take home patch   [] In clinic   10 [x]  Ice     []  heat  []  Ice massage  []  Laser   []  Anodyne Position: semi reclined with LEs elevated  Location: left knee    []  Laser with stim  []  Other:  Position:  Location:    []  Vasopneumatic Device    []  Right     []  Left  Pre-treatment girth:  Post-treatment girth:  Measured at (location):  Pressure:       [] lo [] med [] hi   Temperature: [] lo [] med [] hi   [x] Skin assessment post-treatment:  [x]intact []redness- no adverse reaction    []redness - adverse reaction:     45 min Therapeutic Exercise:  [x] See flow sheet :   Rationale: increase ROM, increase strength and increase proprioception to improve the patients ability to perform ADLs with increased ease          With   [] TE   [] TA   [] neuro   [] other: Patient Education: [x] Review HEP    [] Progressed/Changed HEP based on:   [] positioning   [] body mechanics   [] transfers   [] heat/ice application    [] other:      Other Objective/Functional Measures:     He reports improved tolerance to hip ER/IR  Min pain with LAQ with tibial ER (1/10); improved with tibial IR   Shaking evident to quads with LAQ  Min quad lag with SLR on left   TFL compensation/increased hip flex with sidelying hip abd B (more on right) ; max challenge with sidelying hip add (more on right)  Patient reported low back pain with sidelying add with right improved with cues to keep hips stacked and decreased amount of hip flex  Educated on continued performance of supine/seated hip add with ball at home  Issued HEP with supine hip add and clamshells for hip strength      Pain Level (0-10 scale) post treatment: 2/10    ASSESSMENT/Changes in Function: Patient reports overall subjective improvement since start of care.  He continues to demonstrate decreased left quad strength evidenced by min quad lag with SLR as well as decreased B hip abd/add strength evidenced by difficulty with strengthening exercises against gravity B contributing to overall instability with ambulation. Plan to review form with clamshells/progress with hip ER/IR strength for home next visit. Patient will continue to benefit from skilled PT services to modify and progress therapeutic interventions, address functional mobility deficits, address ROM deficits, address strength deficits, analyze and address soft tissue restrictions, analyze and cue movement patterns, analyze and modify body mechanics/ergonomics, assess and modify postural abnormalities, address imbalance/dizziness and instruct in home and community integration to attain remaining goals. Progress towards goals / Updated goals:  1. Pt will increase FOTO score to at least 55/100 to demonstrate improvement in functional mobility. 2. Pt will report \"a little bit of difficulty\" when asked on the FOTO questionnaire, \"How much difficulty do you have performing heavy activities around your home? \" to demonstrate improved ease of completing household chores like cutting his grass. 3. Pt will be able to complete 10 SLR without extensor lag to improve ease of ambulation without buckling. Progressing-3 reps (6/9/22)  4. Pt will increase left PF MMT to 4/5 for improved ease of negotiating stairs and mowing his grass.    5. Pt will be able to perform SLS on the left for 5 seconds for improved balance to assist with decreased fall risk with ambulation.        PLAN  [x]  Upgrade activities as tolerated     [x]  Continue plan of care  []  Update interventions per flow sheet       []  Discharge due to:_  []  Other:_      Dulce Pablo, PT 6/9/2022  10:25 AM    Future Appointments   Date Time Provider Dex [place]   6/9/2022  1:30 PM Godfrey Baird, GALILEO [facility]   6/14/2022  3:00 PM Rylie Javier PTA [facility]   6/16/2022  1:30 PM Rylie Javier PTA [facility]   6/21/2022  2:15 PM Rylie Javier, BIRGIT [facility]   6/23/2022  2:15 PM Colleen Mcghee PT [facility]   6/28/2022 2:15 PM Edu Shea PTA [facility]   6/30/2022  2:15 PM Eud Shea PTA [facility]   7/5/2022  1:45 PM MD AMY RoweMO BS AMB   8/26/2022  1:10 PM Jayna Loving PA-C [facility] BS AMB   10/18/2022  1:00 PM Roverto Gonsalo   10/25/2022  1:00 PM Josef Chinchilla MD [address] [facility]

## 2022-06-14 ENCOUNTER — HOSPITAL ENCOUNTER (OUTPATIENT)
Dept: PHYSICAL THERAPY | Age: 67
Discharge: HOME | End: 2022-06-14
Payer: MEDICARE

## 2022-06-14 PROCEDURE — 97016 VASOPNEUMATIC DEVICE THERAPY: CPT

## 2022-06-14 PROCEDURE — 97110 THERAPEUTIC EXERCISES: CPT

## 2022-06-14 NOTE — PROGRESS NOTES
PT DAILY TREATMENT NOTE     Patient Name: Donta Luna  HEMS:  : 1955  [x]  Patient  Verified  Payor: VA MEDICARE / Plan: VA MEDICARE PART A & B / Product Type: Medicare /    In time: 1:32 Out time: 2:41  Total Treatment Time (min): 71  Visit #: 4 of 8    Medicare/BCBS Only   Total Timed Codes (min):  54 1:1 Treatment Time: 54       Treatment Area: Left knee pain [M25.562]    SUBJECTIVE  Pain Level (0-10 scale): 3-4/10  Any medication changes, allergies to medications, adverse drug reactions, diagnosis change, or new procedure performed?: [x] No    [] Yes (see summary sheet for update)  Subjective functional status/changes:   [] No changes reported  Pt reports it hasn't been the best day. He hasn't been icing as much and is feeling more pain along the inner/outer side of his knee. He has tried the new exercises at home given last session but struggles without a firm surface to lay on to do the exercises correctly.       OBJECTIVE    Modality rationale: decrease inflammation and decrease pain to improve the patients ability to perform ADLs with increased ease   Min Type Additional Details    [] Estim:  []Unatt       []IFC  []Premod                        []Other:  []w/ice   []w/heat  Position:  Location:    [] Estim: []Att    []TENS instruct  []NMES                    []Other:  []w/US   []w/ice   []w/heat  Position:  Location:    []  Traction: [] Cervical       []Lumbar                       [] Prone          []Supine                       []Intermittent   []Continuous Lbs:  [] before manual  [] after manual    []  Ultrasound: []Continuous   [] Pulsed                           []1MHz   []3MHz W/cm2:  Location:    []  Iontophoresis with dexamethasone         Location: [] Take home patch   [] In clinic    [x]  Ice     []  heat  []  Ice massage  []  Laser   []  Anodyne Position: semi reclined with LEs elevated  Location: left knee    []  Laser with stim  []  Other:  Position:  Location:   15 [x]  Vasopneumatic Device    []  Right     [x]  Left  Pre-treatment girth:  Post-treatment girth:  Measured at (location):  Pressure:       [] lo [x] med [] hi   Temperature: [x] lo [] med [] hi   [x] Skin assessment post-treatment:  [x]intact []redness- no adverse reaction    []redness - adverse reaction:     54 min Therapeutic Exercise:  [x] See flow sheet :   Rationale: increase ROM, increase strength and increase proprioception to improve the patients ability to perform ADLs with increased ease        With   [] TE   [] TA   [] neuro   [] other: Patient Education: [x] Review HEP    [] Progressed/Changed HEP based on:   [] positioning   [] body mechanics   [] transfers   [] heat/ice application    [] other:      Other Objective/Functional Measures:   Hip abduction 3/5 MMT  Reviewed clams #1,2 for form for IR/ER strengthening  Progressed to black band for hip extension/abduction in standing and TKE  Weakness noted with pes isotonic  Decreased pain by end of session    Pain Level (0-10 scale) post treatment: 1.5/10    ASSESSMENT/Changes in Function: Pt struggles with decreased glute strength and hip IR/ER contributing to poor stability of the left knee. He has weakness as well in the tibial internal rotators of the pes anserine. Will need to focus on glute strength and SLS stability without locking the knee in extension for stability and progression of gait without deviations and with decreased pain. Patient will continue to benefit from skilled PT services to modify and progress therapeutic interventions, address functional mobility deficits, address ROM deficits, address strength deficits, analyze and address soft tissue restrictions, analyze and cue movement patterns, analyze and modify body mechanics/ergonomics, assess and modify postural abnormalities, address imbalance/dizziness and instruct in home and community integration to attain remaining goals. Progress towards goals / Updated goals:  1.  Pt will increase FOTO score to at least 55/100 to demonstrate improvement in functional mobility. 2. Pt will report \"a little bit of difficulty\" when asked on the FOTO questionnaire, \"How much difficulty do you have performing heavy activities around your home? \" to demonstrate improved ease of completing household chores like cutting his grass. 3. Pt will be able to complete 10 SLR without extensor lag to improve ease of ambulation without buckling. Progressing-3 reps (6/9/22)  4. Pt will increase left PF MMT to 4/5 for improved ease of negotiating stairs and mowing his grass.    5. Pt will be able to perform SLS on the left for 5 seconds for improved balance to assist with decreased fall risk with ambulation.        PLAN  [x]  Upgrade activities as tolerated     [x]  Continue plan of care  []  Update interventions per flow sheet       []  Discharge due to:_  []  Other:_      Ane Shonda, PTA 6/14/2022  10:25 AM    Future Appointments   Date Time Provider Dex [place]   6/14/2022  3:00 PM Thora Cava, PTA [facility]   6/16/2022  1:30 PM Thora Cava, PTA [facility]   6/21/2022  2:15 PM Thora Cava, PTA [facility]   6/23/2022  2:15 PM Dariusz Bass, PT [facility]   6/28/2022  2:15 PM Thora Cava, PTA [facility]   6/30/2022  2:15 PM Thora Cava, PTA [facility]   7/5/2022  1:45 PM MD AMY BraggMO BS AMB   8/26/2022  1:10 PM Yesi Ro PA-C [facility] BS AMB   10/18/2022  1:00 PM [facility] NURSE [place]   10/25/2022  1:00 PM Melissa Chinchilla MD [id number]

## 2022-06-16 ENCOUNTER — HOSPITAL ENCOUNTER (OUTPATIENT)
Dept: PHYSICAL THERAPY | Age: 67
Discharge: HOME | End: 2022-06-16
Payer: MEDICARE

## 2022-06-16 PROCEDURE — 97110 THERAPEUTIC EXERCISES: CPT

## 2022-06-16 PROCEDURE — 97016 VASOPNEUMATIC DEVICE THERAPY: CPT

## 2022-06-16 NOTE — PROGRESS NOTES
PT DAILY TREATMENT NOTE     Patient Name: Robert Osuna  DALM:  : 1955  [x]  Patient  Verified  Payor: [place] MEDICARE / Plan: VA MEDICARE PART A & B / Product Type: Medicare /    In time: 1:35 Out time: 2:30  Total Treatment Time (min): 55  Visit #: 5 of 8    Medicare/BCBS Only   Total Timed Codes (min):  45 1:1 Treatment Time: 45       Treatment Area: Left knee pain [M25.562]    SUBJECTIVE  Pain Level (0-10 scale): 2-3/10   Any medication changes, allergies to medications, adverse drug reactions, diagnosis change, or new procedure performed?: [x] No    [] Yes (see summary sheet for update)  Subjective functional status/changes:   [] No changes reported  Pt reports able to go down the stairs yesterday and today without the cane. He hasn't tried the new exercises from last session. He has the most difficulty with the leg lift shaking without the knee bending performing the straight leg lifts. He states after prolonged standing he struggles with initiating his walk as if his knee is stuck straight which in the past has caused him to almost buckle.        OBJECTIVE    Modality rationale: decrease inflammation and decrease pain to improve the patients ability to perform ADLs with increased ease   Min Type Additional Details    [] Estim:  []Unatt       []IFC  []Premod                        []Other:  []w/ice   []w/heat  Position:  Location:    [] Estim: []Att    []TENS instruct  []NMES                    []Other:  []w/US   []w/ice   []w/heat  Position:  Location:    []  Traction: [] Cervical       []Lumbar                       [] Prone          []Supine                       []Intermittent   []Continuous Lbs:  [] before manual  [] after manual    []  Ultrasound: []Continuous   [] Pulsed                           []1MHz   []3MHz W/cm2:  Location:    []  Iontophoresis with dexamethasone         Location: [] Take home patch   [] In clinic    [x]  Ice     []  heat  []  Ice massage  []  Laser   [] Anodyne Position: semi reclined with LEs elevated  Location: left knee    []  Laser with stim  []  Other:  Position:  Location:   10 [x]  Vasopneumatic Device    []  Right     [x]  Left  Pre-treatment girth: 45 cm  Post-treatment girth: 44.8 cm  Measured at (location): mid patella  Pressure:       [] lo [x] med [] hi   Temperature: [x] lo [] med [] hi   [x] Skin assessment post-treatment:  [x]intact []redness- no adverse reaction    []redness - adverse reaction:     45 min Therapeutic Exercise:  [x] See flow sheet :   Rationale: increase ROM, increase strength and increase proprioception to improve the patients ability to perform ADLs with increased ease        With   [] TE   [] TA   [] neuro   [] other: Patient Education: [x] Review HEP    [] Progressed/Changed HEP based on:   [] positioning   [] body mechanics   [] transfers   [] heat/ice application    [] other:      Other Objective/Functional Measures: In standing and during gait pt utilizes locking of the left knee for stability versus TKE  Max challenged in // working to maintain unlocked knee for better TKE quad activation  Unable to carryover to gait so working at least with static in // on better awareness  No LOB with foam balance when cued for \"soft\" knees  Improved start up gait when knees were unlocked to start without evidence of buckling of the left knee  Educated for homework to work on keeping left knee soft when standing still    Pain Level (0-10 scale) post treatment: 2/10    ASSESSMENT/Changes in Function: Pt progressing towards all final goals in therapy with biggest weakness TKE strength to avoid locking the knee in extension for standing, balance and gait. He continues to have extensor lag with SLR due to the weakness but is practicing at home with improved awareness. He continues to have swelling of the left knee with prolonged ambulation but has improved ease of stairs with less reliance of the SPC.  He does still struggle with SLS due to glute and TKE weakness. Patient will continue to benefit from skilled PT services to modify and progress therapeutic interventions, address functional mobility deficits, address ROM deficits, address strength deficits, analyze and address soft tissue restrictions, analyze and cue movement patterns, analyze and modify body mechanics/ergonomics, assess and modify postural abnormalities, address imbalance/dizziness and instruct in home and community integration to attain remaining goals. Progress towards goals / Updated goals:  1. Pt will increase FOTO score to at least 55/100 to demonstrate improvement in functional mobility. 2. Pt will report \"a little bit of difficulty\" when asked on the FOTO questionnaire, \"How much difficulty do you have performing heavy activities around your home? \" to demonstrate improved ease of completing household chores like cutting his grass. 3. Pt will be able to complete 10 SLR without extensor lag to improve ease of ambulation without buckling. Progressing-3 reps (6/9/22)  4. Pt will increase left PF MMT to 4/5 for improved ease of negotiating stairs and mowing his grass.    5. Pt will be able to perform SLS on the left for 5 seconds for improved balance to assist with decreased fall risk with ambulation.        PLAN  [x]  Upgrade activities as tolerated     [x]  Continue plan of care  []  Update interventions per flow sheet       []  Discharge due to:_  []  Other:_      Jaime Reeves PTA 6/16/2022  10:25 AM    Future Appointments   Date Time Provider Dex [place]   6/16/2022  1:30 PM Lidia Heard PTA [facility]   6/21/2022  2:15 PM Lidia Heard, PTA [facility]   6/23/2022  2:15 PM Shira Arnold, PT [facility]   6/28/2022  2:15 PM Lidia Heard, PTA [facility]   6/30/2022  2:15 PM Lidia Heard, PTA [facility]   7/5/2022  1:45 PM Jessy Benitez MD [facility] BS AMB   8/26/2022  1:10 PM Angela Pete PA-C [facility] BS AMB   10/18/2022  1:00 PM [facility] [address]   10/25/2022  1:00 PM Given, Luzma Wilkins MD [address] [facility]

## 2022-06-21 ENCOUNTER — HOSPITAL ENCOUNTER (OUTPATIENT)
Dept: PHYSICAL THERAPY | Age: 67
Discharge: HOME | End: 2022-06-21
Payer: MEDICARE

## 2022-06-21 PROCEDURE — 97110 THERAPEUTIC EXERCISES: CPT

## 2022-06-21 NOTE — PROGRESS NOTES
PT DAILY TREATMENT NOTE     Patient Name: Eb Bee  Date:2022  : 1955  [x]  Patient  Verified  Payor: VA MEDICARE / Plan: VA MEDICARE PART A & B / Product Type: Medicare /    In time: 2:20 Out time: 3:23  Total Treatment Time (min): 63  Visit #: 6 of 8    Medicare/BCBS Only   Total Timed Codes (min): 48  1:1 Treatment Time: 48       Treatment Area: Left knee pain [M25.562]    SUBJECTIVE  Pain Level (0-10 scale): 2-3/10  Any medication changes, allergies to medications, adverse drug reactions, diagnosis change, or new procedure performed?: [x] No    [] Yes (see summary sheet for update)  Subjective functional status/changes:   [] No changes reported  Pt reports feeling more stable on stairs but being careful on the higher ones and using his cane. He has been practicing not letting his left knee lock back when standing still like in line at a store. He feels his strength is improving and his pain is lessening and not as constant as it was before.      OBJECTIVE    Modality rationale: decrease inflammation and decrease pain to improve the patients ability to perform ADLs with increased ease   Min Type Additional Details    [] Estim:  []Unatt       []IFC  []Premod                        []Other:  []w/ice   []w/heat  Position:  Location:    [] Estim: []Att    []TENS instruct  []NMES                    []Other:  []w/US   []w/ice   []w/heat  Position:  Location:    []  Traction: [] Cervical       []Lumbar                       [] Prone          []Supine                       []Intermittent   []Continuous Lbs:  [] before manual  [] after manual    []  Ultrasound: []Continuous   [] Pulsed                           []1MHz   []3MHz W/cm2:  Location:    []  Iontophoresis with dexamethasone         Location: [] Take home patch   [] In clinic    [x]  Ice     []  heat  []  Ice massage  []  Laser   []  Anodyne Position: semi reclined with LEs elevated  Location: left knee    []  Laser with stim  [] Other:  Position:  Location:   15 [x]  Vasopneumatic Device    []  Right     [x]  Left  Pre-treatment girth: 47 cm  Post-treatment girth: 46 cm  Measured at (location): mid patella  Pressure:       [] lo [x] med [] hi   Temperature: [x] lo [] med [] hi   [x] Skin assessment post-treatment:  [x]intact []redness- no adverse reaction    []redness - adverse reaction:     48 min Therapeutic Exercise:  [x] See flow sheet :   Rationale: increase ROM, increase strength and increase proprioception to improve the patients ability to perform ADLs with increased ease        With   [] TE   [] TA   [] neuro   [] other: Patient Education: [x] Review HEP    [] Progressed/Changed HEP based on:   [] positioning   [] body mechanics   [] transfers   [] heat/ice application    [] other:      Other Objective/Functional Measures:   Able to perform hip abduction without assistance in side lying today with good form  Cues for standing exercises to avoid locking knee into extension for TKE activation/endurance conditioning  No LOB with foam balance interventions with cues again for \"soft\" knees  Improving gait and progressing with balance interventions  Still requires 1 UE assist for SLS    Pain Level (0-10 scale) post treatment: 2/10    ASSESSMENT/Changes in Function: Pt making slow, steady progress towards goals in therapy with improved awareness of reducing use of locking mechanism of the left knee for stability versus using TKE strength. He has improving hip strength with ability to perform side lying abduction without assistance. He needs continued progression of TKE stability for improved balance and decreased pain with stairs, walking without AD and decreased fall risk.      Patient will continue to benefit from skilled PT services to modify and progress therapeutic interventions, address functional mobility deficits, address ROM deficits, address strength deficits, analyze and address soft tissue restrictions, analyze and cue movement patterns, analyze and modify body mechanics/ergonomics, assess and modify postural abnormalities, address imbalance/dizziness and instruct in home and community integration to attain remaining goals. Progress towards goals / Updated goals:  1. Pt will increase FOTO score to at least 55/100 to demonstrate improvement in functional mobility. 2. Pt will report \"a little bit of difficulty\" when asked on the FOTO questionnaire, \"How much difficulty do you have performing heavy activities around your home? \" to demonstrate improved ease of completing household chores like cutting his grass. 3. Pt will be able to complete 10 SLR without extensor lag to improve ease of ambulation without buckling. Progressing-3 reps (6/9/22)  4. Pt will increase left PF MMT to 4/5 for improved ease of negotiating stairs and mowing his grass. Progressing   5.  Pt will be able to perform SLS on the left for 5 seconds for improved balance to assist with decreased fall risk with ambulation. Continues to requires 1 UE assist (6/21/22)       PLAN  [x]  Upgrade activities as tolerated     [x]  Continue plan of care  []  Update interventions per flow sheet       []  Discharge due to:_  []  Other:_      Jose Angel Justice PTA 6/21/2022  10:25 AM    Future Appointments   Date Time Provider Dex Alysia   6/21/2022  2:15 PM Charles Rape, PTA [facility]   6/23/2022  2:15 PM Luz Hendricks, GALILEO [facility]   6/28/2022  2:15 PM Charles Rape, PTA [facility]   6/30/2022  2:15 PM Charles Rape, PTA [facility]   7/5/2022  1:45 PM MD NINA Jean BS AMB   8/26/2022  1:10 PM ESPERANZA Burrows BS AMB   10/18/2022  1:00 PM [facility] NURSE [place]   10/25/2022  1:00 PM Richy Chinchilla MD [place]

## 2022-06-23 ENCOUNTER — HOSPITAL ENCOUNTER (OUTPATIENT)
Dept: PHYSICAL THERAPY | Age: 67
Discharge: HOME | End: 2022-06-23
Payer: MEDICARE

## 2022-06-23 PROCEDURE — 97110 THERAPEUTIC EXERCISES: CPT

## 2022-06-23 PROCEDURE — 97016 VASOPNEUMATIC DEVICE THERAPY: CPT

## 2022-06-23 NOTE — PROGRESS NOTES
PT DAILY TREATMENT NOTE     Patient Name: Jojo Yee  Date:2022  : 1955  [x]  Patient  Verified  Payor: [place] MEDICARE / Plan: [place] MEDICARE PART A & B / Product Type: Medicare /    In time: 2:18  Out time: 3:15  Total Treatment Time (min): 62  Visit #: 7 of 8    Medicare/BCBS Only   Total Timed Codes (min): 42 1:1 Treatment Time: 42       Treatment Area: Left knee pain [M25.562]    SUBJECTIVE  Pain Level (0-10 scale): 3/10  Any medication changes, allergies to medications, adverse drug reactions, diagnosis change, or new procedure performed?: [x] No    [] Yes (see summary sheet for update)  Subjective functional status/changes:   [] No changes reported  Pt reports less constant pain and improving strength. He will be ready to D/C in 2 visits with final home program. He still struggles with higher steps like 8\" and not plopping to sit down. He is more aware of keeping knee soft to avoid locking it out.     OBJECTIVE    Modality rationale: decrease inflammation and decrease pain to improve the patients ability to perform ADLs with increased ease   Min Type Additional Details    [] Estim:  []Unatt       []IFC  []Premod                        []Other:  []w/ice   []w/heat  Position:  Location:    [] Estim: []Att    []TENS instruct  []NMES                    []Other:  []w/US   []w/ice   []w/heat  Position:  Location:    []  Traction: [] Cervical       []Lumbar                       [] Prone          []Supine                       []Intermittent   []Continuous Lbs:  [] before manual  [] after manual    []  Ultrasound: []Continuous   [] Pulsed                           []1MHz   []3MHz W/cm2:  Location:    []  Iontophoresis with dexamethasone         Location: [] Take home patch   [] In clinic    [x]  Ice     []  heat  []  Ice massage  []  Laser   []  Anodyne Position: semi reclined with LEs elevated  Location: left knee    []  Laser with stim  []  Other:  Position:  Location:   15 [x]  Vasopneumatic Device    []  Right     [x]  Left  Pre-treatment girth: 47  cm  Post-treatment girth: 46 cm  Measured at (location): mid patella  Pressure:       [] lo [x] med [] hi   Temperature: [x] lo [] med [] hi   [x] Skin assessment post-treatment:  [x]intact []redness- no adverse reaction    []redness - adverse reaction:     42 min Therapeutic Exercise:  [x] See flow sheet :   Rationale: increase ROM, increase strength and increase proprioception to improve the patients ability to perform ADLs with increased ease        With   [] TE   [] TA   [] neuro   [] other: Patient Education: [x] Review HEP    [] Progressed/Changed HEP based on:   [] positioning   [] body mechanics   [] transfers   [] heat/ice application    [] other:      Other Objective/Functional Measures:   Poor anterior weight shift for step ups with decreased use of quad   Challenged with eccentric sit to stands with left leg bag  10 SLR without extensor lag  PF 2+/5 bilaterally  Will provide updated HEP next session    Pain Level (0-10 scale) post treatment: 2/10    ASSESSMENT/Changes in Function:  Pt making steady progress towards goals as he is able to perform 10 SLR without extensor lag. He continues to lack quad strength and PF strength for step ups to avoid using momentum for higher steps. Will initiate final HEP next session and review one final session in preparation for D/C from therapy. Patient will continue to benefit from skilled PT services to modify and progress therapeutic interventions, address functional mobility deficits, address ROM deficits, address strength deficits, analyze and address soft tissue restrictions, analyze and cue movement patterns, analyze and modify body mechanics/ergonomics, assess and modify postural abnormalities, address imbalance/dizziness and instruct in home and community integration to attain remaining goals. Progress towards goals / Updated goals:  1.  Pt will increase FOTO score to at least 55/100 to demonstrate improvement in functional mobility. 2. Pt will report \"a little bit of difficulty\" when asked on the FOTO questionnaire, \"How much difficulty do you have performing heavy activities around your home? \" to demonstrate improved ease of completing household chores like cutting his grass. 3. Pt will be able to complete 10 SLR without extensor lag to improve ease of ambulation without buckling. Progressing-3 reps (6/9/22) MET 10 reps (6/24/22)  4. Pt will increase left PF MMT to 4/5 for improved ease of negotiating stairs and mowing his grass. 2+/5  5.  Pt will be able to perform SLS on the left for 5 seconds for improved balance to assist with decreased fall risk with ambulation. Continues to requires 1 UE assist (6/21/22)       PLAN  [x]  Upgrade activities as tolerated     [x]  Continue plan of care  []  Update interventions per flow sheet       []  Discharge due to:_  []  Other:_      Kala Rivera PTA 6/23/2022  10:25 AM    Future Appointments   Date Time Provider Dex [place]   6/23/2022  2:15 PM Imanlizy Luna, PTA [facility]   6/28/2022  2:15 PM Iman [place], PTA [facility]   6/30/2022  2:15 PM Harrison Rosa, PTA [facility]   7/5/2022  1:45 PM Orlando Tyson MD [facility] AMB   8/26/2022  1:10 PM ESPERANZA Avila BS AMB   10/18/2022  1:00 PM [facility] NURSE [place]   10/25/2022  1:00 PM Given, Emily Stern MD [id number]

## 2022-06-28 ENCOUNTER — HOSPITAL ENCOUNTER (OUTPATIENT)
Dept: PHYSICAL THERAPY | Age: 67
Discharge: HOME | End: 2022-06-28
Payer: MEDICARE

## 2022-06-28 PROCEDURE — 97110 THERAPEUTIC EXERCISES: CPT

## 2022-06-28 PROCEDURE — 97530 THERAPEUTIC ACTIVITIES: CPT

## 2022-06-28 PROCEDURE — 97016 VASOPNEUMATIC DEVICE THERAPY: CPT

## 2022-06-28 NOTE — PROGRESS NOTES
PT DAILY TREATMENT NOTE     Patient Name: Bob Luque  Date:2022  : 1955  [x]  Patient  Verified  Payor: [place] MEDICARE / Plan: [place] MEDICARE PART A & B / Product Type: Medicare /    In time: 2:20  Out time: 3:30  Total Treatment Time (min): 70  Visit #: 8 of 8    Medicare/BCBS Only   Total Timed Codes (min): 55 1:1 Treatment Time: 55       Treatment Area: Left knee pain [M25.562]    SUBJECTIVE  Pain Level (0-10 scale): 2/10  Any medication changes, allergies to medications, adverse drug reactions, diagnosis change, or new procedure performed?: [x] No    [] Yes (see summary sheet for update)  Subjective functional status/changes:   [] No changes reported  Pt reports overall less pain in his knee and improved ease on stairs. He is okay with D/C next session. He had a few days of higher BP but noted a headache as well which are chronic for him. He states INR is getting better and more controlled.      OBJECTIVE    Modality rationale: decrease inflammation and decrease pain to improve the patients ability to perform ADLs with increased ease   Min Type Additional Details    [] Estim:  []Unatt       []IFC  []Premod                        []Other:  []w/ice   []w/heat  Position:  Location:    [] Estim: []Att    []TENS instruct  []NMES                    []Other:  []w/US   []w/ice   []w/heat  Position:  Location:    []  Traction: [] Cervical       []Lumbar                       [] Prone          []Supine                       []Intermittent   []Continuous Lbs:  [] before manual  [] after manual    []  Ultrasound: []Continuous   [] Pulsed                           []1MHz   []3MHz W/cm2:  Location:    []  Iontophoresis with dexamethasone         Location: [] Take home patch   [] In clinic    [x]  Ice     []  heat  []  Ice massage  []  Laser   []  Anodyne Position: semi reclined with LEs elevated  Location: left knee    []  Laser with stim  []  Other:  Position:  Location:   15 [x]  Vasopneumatic Device []  Right     [x]  Left  Pre-treatment girth: 47  cm  Post-treatment girth: 46 cm  Measured at (location): mid patella  Pressure:       [] lo [x] med [] hi   Temperature: [x] lo [] med [] hi   [x] Skin assessment post-treatment:  [x]intact []redness- no adverse reaction    []redness - adverse reaction:     25 min Therapeutic Exercise:  [x] See flow sheet :   Rationale: increase ROM, increase strength and increase proprioception to improve the patients ability to perform ADLs with increased ease    30 min Therapeutic Activity:  [x] See flow sheet : BP monitoring; goal reassessment; sit to stands; stairs   Rationale: increase ROM, increase strength and increase proprioception to improve the patients ability to perform ADLs with increased ease        With   [] TE   [] TA   [] neuro   [] other: Patient Education: [x] Review HEP    [] Progressed/Changed HEP based on:   [] positioning   [] body mechanics   [] transfers   [] heat/ice application    [] other:      Other Objective/Functional Measures: FOTO: 58/100  Left PF MMT: 2+/5  SLS left: 5 seconds  Initiated final HEP  Educated to keep log 2x day for BP for MD  /80 taken left arm manually  Provided BTB and black TB for final HEP  Educated for left \"soft knee\" for hip abduction and extension in standing    Pain Level (0-10 scale) post treatment: 1/10    ASSESSMENT/Changes in Function:  See Recertification. Patient will continue to benefit from skilled PT services to modify and progress therapeutic interventions, address functional mobility deficits, address ROM deficits, address strength deficits, analyze and address soft tissue restrictions, analyze and cue movement patterns, analyze and modify body mechanics/ergonomics, assess and modify postural abnormalities, address imbalance/dizziness and instruct in home and community integration to attain remaining goals. Progress towards goals / Updated goals:  1.  Pt will increase FOTO score to at least 55/100 to demonstrate improvement in functional mobility. MET 58/100  2. Pt will report \"a little bit of difficulty\" when asked on the FOTO questionnaire, \"How much difficulty do you have performing heavy activities around your home? \" to demonstrate improved ease of completing household chores like cutting his grass. Progressed \"quite a bit of difficulty\" (6/28/22)  3. Pt will be able to complete 10 SLR without extensor lag to improve ease of ambulation without buckling. Progressing-3 reps (6/9/22) MET 10 reps (6/24/22)  4. Pt will increase left PF MMT to 4/5 for improved ease of negotiating stairs and mowing his grass. 2+/5 partial range in standing SLS  5. Pt will be able to perform SLS on the left for 5 seconds for improved balance to assist with decreased fall risk with ambulation. Continues to requires 1 UE assist (6/21/22) MET 5 seconds (6/28/22)       PLAN  [x]  Upgrade activities as tolerated     [x]  Continue plan of care  []  Update interventions per flow sheet       []  Discharge due to:_  [x]  Other: D/C next visit.     Norma Small PTA 6/28/2022  10:25 AM    Future Appointments   Date Time Provider Dex Hatfield   6/28/2022  2:15 PM Calvin Ruby [facility]   6/30/2022  2:15 PM Zaheer Montes PTA [facility]   7/5/2022  1:45 PM MD AMY ShaikhMO BS AMB   8/26/2022  1:10 PM ESPERANZA Quigley BS AMB   10/18/2022  1:00 PM [facility] NURSE [place]   10/25/2022  1:00 PM Amor, MD [place] Malloy

## 2022-06-28 NOTE — PROGRESS NOTES
Physical Therapy Discharge Instructions      [facility] [employer] [facility]  [address] [place] [address]  [phone] fax    Patient: Jamila Monteiro  : 1955      Continue Home Exercise Program 3-4 days a week with rest breaks in between as needed    Continue with    [x] Ice  as needed for 10 minutes with elevation     [] Heat           Follow up with MD:     [] Upon completion of therapy     [x] As needed      Recommendations:     [x]   Return to activity with home program    []   Return to activity with the following modifications:       []Post Rehab Program    []Join Independent aquatic program     []Return to/join local gym        Additional Comments: Continue to be aware of keeping the left knee \"soft\" when standing to avoid locking out the joint and not using the muscles. Good luck and good job!           Jose Antonio Astudillo, PTA 2022 5:53 PM

## 2022-06-28 NOTE — PROGRESS NOTES
[facility] - [facility]  [address] [facility]  [phone] fax    Continued Plan of Care/ Re-certification for Physical Therapy Services    Patient name: Danni Ventura Start of Care: 2022   Referral source: Carolyn Gruber : 1955   Medical/Treatment Diagnosis: Left knee pain [M25.562]  Payor: [place] MEDICARE / Plan: VA MEDICARE PART A & B / Product Type: Medicare /  Onset Date: 3/9/22     Prior Hospitalization: see medical history Provider#: [id number]   Medications: Verified on Patient Summary List    Comorbidities: HTN, arthritis, history of DVT, history of cancer  Prior Level of Function: Ind with ambulation, hiking, hunting, fishing, Ind with ADLs    Visits from Start of Care: 22    Missed Visits: 0    The Plan of Care and following information is based on the patient's current status:  Goal: Pt will increase FOTO score to at least 55/100 to demonstrate improvement in functional mobility. Status at last note/certification: 15/968  Current Status: met 58/100    Goal: Pt will report \"a little bit of difficulty\" when asked on the FOTO questionnaire, \"How much difficulty do you have performing heavy activities around your home? \" to demonstrate improved ease of completing household chores like cutting his grass. Status at last note/certification:\"quite a bit of difficulty\"  Current Status: not met \"quite a bit of difficulty\"    Goal:Pt will be able to complete 10 SLR without extensor lag to improve ease of ambulation without buckling. Status at last note/certification: unable without lag  Current Status: met 10 without extensor lag    Goal: Pt will increase left PF MMT to 4/5 for improved ease of negotiating stairs and mowing his grass.    Status at last note/certification: 2+/5  Current Status: not met 2+/5    Goal: Pt will be able to perform SLS on the left for 5 seconds for improved balance to assist with decreased fall risk with ambulation. Status at last note/certification: unable  Current Status: met 5 seconds    Key functional changes: improved strength, improved ease of ambulation/stairs, decreased pain      Problems/ barriers to goal attainment: none     Problem List: pain affecting function, decrease strength, edema affecting function, impaired gait/ balance, decrease ADL/ functional abilitiies, decrease activity tolerance, decrease flexibility/ joint mobility and decrease transfer abilities    Treatment Plan: Therapeutic exercise, Therapeutic activities, Neuromuscular re-education, Physical agent/modality, Gait/balance training, Manual therapy, Patient education, Self Care training, Functional mobility training, Home safety training and Stair training     Patient Goal (s) has been updated and includes: \"get stronger\"    Goals for this certification period to be accomplished in 1 treatments:  1. Patient will be independent with a comprehensive final home program for continued strengthening to improve ease of ambulation, balance and stairs upon discharge from therapy. Frequency / Duration: Patient to be seen 1 time per week for 1 visit:    Assessment / Recommendations: Mr. Lu Melo made excellent progress towards final goals in therapy. He increased his FOTO score to 58/100 indicating overall functional improvement. He improved his ability to complete a SLR without extensor lag indicating improved quad strength to reduce buckling with ambulation and ADLs. He subjectively reports improvement in ease of transfers and stairs with overall decreased average knee pain. He continues to demonstrate decreased PF strength at just 2+/5 likely contributing to difficulty with pushing activities like mowing his grass. He improved his left SLS to 5 seconds indicating a decrease in fall risk and overall improvement in stability.  Skilled PT remains medically necessary for one final session to review his final HEP to ensure independence with strengthening upon discharge from therapy. Certification Period: 6/29/2022 to 7/28/2022    Jerardo Abarca, PTA 6/28/2022 5:42 PM    ________________________________________________________________________  I certify that the above Therapy Services are being furnished while the patient is under my care. I agree with the treatment plan and certify that this therapy is necessary. [] I have read the above and request that my patient continue as recommended.   [] I have read the above report and request that my patient continue therapy with the following changes/special instructions: _______________________________________  [] I have read the above report and request that my patient be discharged from therapy    Physician's Signature:____________Date:_________TIME:________     Khalida Reyes PA-C  ** Signature, Date and Time must be completed for valid certification **    Please sign and return to [facility]  [address] [facility]  [phone] fax

## 2022-06-30 ENCOUNTER — HOSPITAL ENCOUNTER (OUTPATIENT)
Dept: PHYSICAL THERAPY | Age: 67
End: 2022-06-30
Payer: MEDICARE

## 2022-07-05 ENCOUNTER — OFFICE VISIT (OUTPATIENT)
Dept: ORTHOPEDIC SURGERY | Age: 67
End: 2022-07-05
Payer: OTHER MISCELLANEOUS

## 2022-07-05 VITALS
OXYGEN SATURATION: 96 % | TEMPERATURE: 97.6 F | HEART RATE: 94 BPM | HEIGHT: 70 IN | BODY MASS INDEX: 33.5 KG/M2 | WEIGHT: 234 LBS | RESPIRATION RATE: 20 BRPM

## 2022-07-05 DIAGNOSIS — M79.18 MYOFASCIAL PAIN: ICD-10-CM

## 2022-07-05 DIAGNOSIS — M48.061 SPINAL STENOSIS OF LUMBAR REGION WITHOUT NEUROGENIC CLAUDICATION: ICD-10-CM

## 2022-07-05 DIAGNOSIS — M47.816 FACET ARTHROPATHY, LUMBAR: Primary | ICD-10-CM

## 2022-07-05 DIAGNOSIS — Z79.01 WARFARIN ANTICOAGULATION: ICD-10-CM

## 2022-07-05 DIAGNOSIS — G62.9 NEUROPATHY: ICD-10-CM

## 2022-07-05 DIAGNOSIS — M51.26 DISPLACEMENT OF LUMBAR INTERVERTEBRAL DISC WITHOUT MYELOPATHY: ICD-10-CM

## 2022-07-05 DIAGNOSIS — M62.838 MUSCLE SPASM: ICD-10-CM

## 2022-07-05 PROCEDURE — 99214 OFFICE O/P EST MOD 30 MIN: CPT | Performed by: PHYSICAL MEDICINE & REHABILITATION

## 2022-07-05 PROCEDURE — 1124F ACP DISCUSS-NO DSCNMKR DOCD: CPT | Performed by: PHYSICAL MEDICINE & REHABILITATION

## 2022-07-05 NOTE — PATIENT INSTRUCTIONS
Low Back Arthritis: Exercises  Introduction  Here are some examples of typical rehabilitation exercises for your condition. Start each exercise slowly. Ease off the exercise if you start to have pain. Your doctor or physical therapist will tell you when you can start these exercises and which ones will work best for you. When you are not being active, find a comfortable position for rest. Some people are comfortable on the floor or a medium-firm bed with a small pillow under their head and another under their knees. Some people prefer to lie on their side with a pillow between their knees. Don't stay in one position for too long. Take short walks (10 to 20 minutes) every 2 to 3 hours. Avoid slopes, hills, and stairs until you feel better. Walk only distances you can manage without pain, especially leg pain. How to do the exercises  Pelvic tilt    1. Lie on your back with your knees bent. 2. \"Brace\" your stomach--tighten your muscles by pulling in and imagining your belly button moving toward your spine. 3. Press your lower back into the floor. You should feel your hips and pelvis rock back. 4. Hold for 6 seconds while breathing smoothly. 5. Relax and allow your pelvis and hips to rock forward. 6. Repeat 8 to 12 times. Back stretches    1. Get down on your hands and knees on the floor. 2. Relax your head and allow it to droop. Round your back up toward the ceiling until you feel a nice stretch in your upper, middle, and lower back. Hold this stretch for as long as it feels comfortable, or about 15 to 30 seconds. 3. Return to the starting position with a flat back while you are on your hands and knees. 4. Let your back sway by pressing your stomach toward the floor. Lift your buttocks toward the ceiling. 5. Hold this position for 15 to 30 seconds. 6. Repeat 2 to 4 times. Follow-up care is a key part of your treatment and safety.  Be sure to make and go to all appointments, and call your doctor if you are having problems. It's also a good idea to know your test results and keep a list of the medicines you take. Where can you learn more? Go to http://www.[employer].com/  Enter T094 in the search box to learn more about \"Low Back Arthritis: Exercises. \"  Current as of: July 1, 2021               Content Version: 13.2  © 2006-2022 [employer]. Care instructions adapted under license by [employer] (which disclaims liability or warranty for this information). If you have questions about a medical condition or this instruction, always ask your healthcare professional. [place] [address] any warranty or liability for your use of this information. Sacroiliac Pain: Exercises  Introduction  Here are some examples of exercises for you to try. The exercises may be suggested for a condition or for rehabilitation. Start each exercise slowly. Ease off the exercises if you start to have pain. You will be told when to start these exercises and which ones will work best for you. How to do the exercises  Knee-to-chest stretch    1. Do not do the knee-to-chest exercise if it causes or increases back or leg pain. 2. Lie on your back with your knees bent and your feet flat on the floor. You can put a small pillow under your head and neck if it is more comfortable. 3. Grasp your hands under one knee and bring the knee to your chest, keeping the other foot flat on the floor. 4. Keep your lower back pressed to the floor. Hold for at least 15 to 30 seconds. 5. Relax and lower the knee to the starting position. Repeat with the other leg. 6. Repeat 2 to 4 times with each leg. 7. To get more stretch, keep your other leg flat on the floor while pulling your knee to your chest.  Bridging    1. Lie on your back with both knees bent. Your knees should be bent about 90 degrees. 2. Tighten your belly muscles by pulling in your belly button toward your spine.  Then push your feet into the floor, squeeze your buttocks, and lift your hips off the floor until your shoulders, hips, and knees are all in a straight line. 3. Hold for about 6 seconds as you continue to breathe normally, and then slowly lower your hips back down to the floor and rest for up to 10 seconds. 4. Repeat 8 to 12 times. Hip extension    1. Get down on your hands and knees on the floor. 2. Keeping your back and neck straight, lift one leg straight out behind you. When you lift your leg, keep your hips level. Don't let your back twist, and don't let your hip drop toward the floor. 3. Hold for 6 seconds. Repeat 8 to 12 times with each leg. 4. If you feel steady and strong when you do this exercise, you can make it more difficult. To do this, when you lift your leg, also lift the opposite arm straight out in front of you. For example, lift the left leg and the right arm at the same time. (This is sometimes called the \"bird dog exercise. \") Hold for 6 seconds, and repeat 8 to 12 times on each side. Clamshell    1. Lie on your side with a pillow under your head. Keep your feet and knees together and your knees bent. 2. Raise your top knee, but keep your feet together. Do not let your hips roll back. Your legs should open up like a clamshell. 3. Hold for 6 seconds. 4. Slowly lower your knee back down. Rest for 10 seconds. 5. Repeat 8 to 12 times. 6. Switch to your other side and repeat steps 1 through 5. Hamstring wall stretch    1. Lie on your back in a doorway, with one leg through the open door. 2. Slide your affected leg up the wall to straighten your knee. You should feel a gentle stretch down the back of your leg. 3. Hold the stretch for at least 1 minute to begin. Then try to lengthen the time you hold the stretch to as long as 6 minutes. 4. Switch legs, and repeat steps 1 through 3.  5. Repeat 2 to 4 times.   6. If you do not have a place to do this exercise in a doorway, there is another way to do it:  7. Lie on your back, and bend one knee. 8. Loop a towel under the ball and toes of that foot, and hold the ends of the towel in your hands. 9. Straighten your knee, and slowly pull back on the towel. You should feel a gentle stretch down the back of your leg. 10. Switch legs, and repeat steps 1 through 3.  11. Repeat 2 to 4 times. 1. Do not arch your back. 2. Do not bend either knee. 3. Keep one heel touching the floor and the other heel touching the wall. Do not point your toes. Lower abdominal strengthening    1. Lie on your back with your knees bent and your feet flat on the floor. 2. Tighten your belly muscles by pulling your belly button in toward your spine. 3. Lift one foot off the floor and bring your knee toward your chest, so that your knee is straight above your hip and your leg is bent like the letter \"L. \"  4. Lift the other knee up to the same position. 5. Lower one leg at a time to the starting position. 6. Keep alternating legs until you have lifted each leg 8 to 12 times. 7. Be sure to keep your belly muscles tight and your back still as you are moving your legs. Be sure to breathe normally. Piriformis stretch    1. Lie on your back with your legs straight. 2. Lift your affected leg, and bend your knee. With your opposite hand, reach across your body, and then gently pull your knee toward your opposite shoulder. 3. Hold the stretch for 15 to 30 seconds. 4. Switch legs and repeat steps 1 through 3.  5. Repeat 2 to 4 times. Follow-up care is a key part of your treatment and safety. Be sure to make and go to all appointments, and call your doctor if you are having problems. It's also a good idea to know your test results and keep a list of the medicines you take. Where can you learn more? Go to http://www.[employer].com/  Enter T316 in the search box to learn more about \"Sacroiliac Pain: Exercises. \"  Current as of: July 1, 2021               Content Version: 13.2  © 2628-3307 [employer]. Care instructions adapted under license by [employer] (which disclaims liability or warranty for this information). If you have questions about a medical condition or this instruction, always ask your healthcare professional. [address] any warranty or liability for your use of this information.

## 2022-07-05 NOTE — PROGRESS NOTES
[facility]  [address] [place], [address]  Phone: [phone]  Fax: [phone]    Pt's YOB: 1955    ASSESSMENT   Diagnoses and all orders for this visit:    1. Facet arthropathy, lumbar    2. Spinal stenosis of lumbar region without neurogenic claudication    3. Neuropathy    4. Muscle spasm    5. Displacement of lumbar intervertebral disc without myelopathy    6. Myofascial pain    7. Warfarin anticoagulation         IMPRESSION AND PLAN:  Pt is 68 yo male with hx lumbar pain on warfarin and currently doing well with medication which includes gabapentin, skelaxin and also has been using fioricet for his headache pain     1) Pt was given information on low back arthritis and sacroiliac pain exercises. 2) Pt will continue with gabapentin 100 mg and does not need a refill  3) Pt is not a candidate for NSAIDs due to use of coumadin  4) Mr. Rehman Officer has a reminder for a \"due or due soon\" health maintenance. I have asked that he contact his primary care provider, Renetta Espinoza NP, for follow-up on this health maintenance. 5)  demonstrated consistency with prescribing. 6) Pt will continue with HEP  Follow-up and Dispositions    · Return in about 3 months (around 10/5/2022) for Medication follow up. HISTORY OF PRESENT ILLNESS:  Kobe Roberts is a 69 y.o.  male with history of chronic lumbar pain and presents to the office today for 3 month follow up. Pt states he has fallen twice since his last office visit. He underwent a total knee replacement and had to have a revision. He completed therapy 1 month ago. He has \"sciatica\" after his most recent surgery and this was quite painful. He decided to take the medrol dose pack and notes that his pain was completely gone when he finished it. He is very pleased with the resolution of his pain and he remains on the gabapentin 100 mg.   He also uses the skelaxin intermittently as needed. He also remains on coumadin as he had a DVT in the past. He also reports some left knee pain and swelling but this also has improved. Pt desires to continue with current medications. More than 34 minutes was spent with the pt extensively discussing his recent surgeries, his symptoms, therapy, medication and plan of care. Pain Scale: 0 - No pain/10    PCP: Cori Balbuena NP     Past Medical History:   Diagnosis Date    Arthritis     Chronic pain     knee and shoulder    DVT [facility]deep venous thrombosis) ([facility] [address] 1992    post sx.   R LEG    D[facility]deep [facility] thrombosis) ([facility] 2000    POST BACK SX. 2- LEFT LEG    GERD (gastroesophageal reflux disease)     Headache(784.0)     everyday    High cholesterol     Hypertension     Prostate cancer ([facility] [address] 2018    Pure hypercholesterolemia     Spinal stenosis     Thromboembolus ([facility]         Social History     Socioeconomic History    Marital status:      Spouse name: Not on file    Number of children: Not on file    Years of education: Not on file    Highest education level: Not on file   Occupational History    Not on file   Tobacco Use    Smoking status: Never Smoker    Smokeless tobacco: Never Used   Vaping Use    Vaping Use: Never used   Substance and Sexual Activity    Alcohol use: No    Drug use: Never    Sexual activity: Not Currently   Other Topics Concern     Service Not Asked    Blood Transfusions Not Asked    Caffeine Concern Not Asked    Occupational Exposure Not Asked    Hobby Hazards Not Asked    Sleep Concern Not Asked    Stress Concern Not Asked    Weight Concern Not Asked    Special Diet Not Asked    Back Care Not Asked    Exercise Not Asked    Bike Helmet Not Asked   [address] Not Asked    Self-Exams Not Asked   Social History Narrative    Not on file     Social Determinants of Health     Financial Resource Strain:     Difficulty of Paying Living Expenses: Not on file Food Insecurity:     Worried About Running Out of Food in the Last Year: Not on file    Marley of Food in the Last Year: Not on file   Transportation Needs:     Lack of Transportation (Medical): Not on file    Lack of Transportation (Non-Medical): Not on file   Physical Activity:     Days of Exercise per Week: Not on file    Minutes of Exercise per Session: Not on file   Stress:     Feeling of Stress : Not on file   Social Connections:     Frequency of Communication with Friends and Family: Not on file    Frequency of Social Gatherings with Friends and Family: Not on file    Attends Jehovah's witness Services: Not on file    Active Member of [address] [place] [address] [employer] or Organizations: Not on file    Attends Club or Organization Meetings: Not on file    Marital Status: Not on file   Intimate Partner Violence:     Fear of Current or Ex-Partner: Not on file    Emotionally Abused: Not on file    Physically Abused: Not on file    Sexually Abused: Not on file   Housing Stability:     Unable to Pay for Housing in the Last Year: Not on file    Number of [place] in the Last Year: Not on file    Unstable Housing in the Last Year: Not on file       Current Outpatient Medications   Medication Sig Dispense Refill    metaxalone (Skelaxin) 800 mg tablet Take 1 Tablet by mouth three (3) times daily. Take as needed  Indications: muscle spasm 90 Tablet 3    gabapentin (NEURONTIN) 100 mg capsule Take 2 capsules at bedtime as directed  Indications: neuropathic pain 180 Capsule 1    methylPREDNISolone (MEDROL DOSEPACK) 4 mg tablet Per dose pack instructions 1 Dose Pack 0    clotrimazole-betamethasone (LOTRISONE) 1-0.05 % lotion Apply  to affected area two (2) times a day. 30 mL 0    L gasseri/B bifidum/B longum (MCALLISTER COLON CPUsage PO) Take 1 Tablet by mouth nightly.  lansoprazole (PREVACID) 30 mg capsule Take 30 mg by mouth daily.       warfarin (COUMADIN) 5 mg tablet TAKE 2 AND 1/2 TABLETS BY MOUTH DAILY OR AS DIRECTED (Patient taking differently: Take  by mouth daily. Patient takes 2  TABLETS BY MOUTH Daily or as directed) 75 Tab 0    lisinopril-hydroCHLOROthiazide (PRINZIDE, ZESTORETIC) 20-12.5 mg per tablet TAKE 1 TABLET BY MOUTH DAILY (Patient taking differently: Take 1 Tab by mouth daily. TAKE 1 TABLET BY MOUTH DAILY) 90 Tab 1    labetalol (NORMODYNE) 100 mg tablet TAKE 1 TABLET BY MOUTH TWICE DAILY. STOP VERAPAMIL (Patient taking differently: Take  by mouth two (2) times a day.) 180 Tab 0    butalbital-acetaminophen-caff (FIORICET) -40 mg per capsule 2 cap three times per day as needed for headache (Patient taking differently: Take 1 Capsule by mouth daily. 2 cap three times per day as needed for headache) 180 Cap 1    ALPRAZolam (XANAX) 0.5 mg tablet Take one half(1/2) tab to one(1) tab by mouth at bedtime as needed for sleep (Patient taking differently: Take  by mouth nightly as needed. Take one half(1/2) tab to one(1) tab by mouth at bedtime as needed for sleep) 30 Tab 0    montelukast (SINGULAIR) 10 mg tablet TAKE 1 TABLET BY MOUTH EVERY DAY 90 Tab 0    acetaminophen (TYLENOL) 500 mg tablet Take 1,000 mg by mouth every six (6) hours as needed for Pain.  doxycycline (MONODOX) 100 mg capsule Take 100 mg by mouth two (2) times a day. (Patient not taking: Reported on 5/20/2022)      allopurinoL (ZYLOPRIM) 100 mg tablet Take 1 Tablet by mouth two (2) times a day. (Patient not taking: Reported on 7/5/2022) 60 Tablet 0       Allergies   Allergen Reactions    Amoxicillin Itching    Augmentin [Amoxicillin-Pot Clavulanate] Itching    Chlorhexidine Unknown (comments)    Chlorhexidine Towelette Itching    Hibiclens [Chlorhexidine Gluconate] Itching    Milk Containing Products Diarrhea    Nsaids (Non-Steroidal Anti-Inflammatory Drug) Other (comments)     On blood thinner, contraindicated.      Penicillins Rash    Requip [Ropinirole] Nausea and Vomiting    Simvastatin Other (comments)         REVIEW OF SYSTEMS    Constitutional: Negative for fever, chills, or weight change. Respiratory: Negative for cough or shortness of breath. Cardiovascular: Negative for chest pain or palpitations. Gastrointestinal: Negative for acid reflux, change in bowel habits, or constipation. Genitourinary: Negative for dysuria and flank pain. Musculoskeletal: Positive for lumbar and knee pain. Skin: Negative for rash. Neurological: Negative for headaches, dizziness, or numbness. Endo/Heme/Allergies: Negative for increased bruising. Psychiatric/Behavioral: Negative for difficulty with sleep. As per HPI    PHYSICAL EXAMINATION  Visit Vitals  Pulse 94   Temp 97.6 °F (36.4 °C) (Temporal)   Resp 20   Ht 5' 10\" (1.778 m)   Wt 234 lb (106.1 kg)   SpO2 96%   BMI 33.58 kg/m²       Constitutional: Awake, alert, and in no acute distress. Neurological: 1+ symmetrical DTRs in the upper extremities. 1+ symmetrical DTRs in the lower extremities. Sensation to light touch is intact. Negative Lepe's sign bilaterally. Skin: warm, dry, and intact. Musculoskeletal:  Mild pain with extension, axial loading, or forward flexion. No pain with internal or external rotation of his hips. Negative straight leg raise bilaterally. Biceps  Triceps Deltoids Wrist Ext Wrist Flex Hand Intrin   Right +4/5 +4/5 +4/5 +4/5 +4/5 +4/5   Left +4/5 +4/5 +4/5 +4/5 +4/5 +4/5      Hip Flex  Quads Hamstrings Ankle DF EHL Ankle PF   Right +4/5 +4/5 +4/5 +4/5 +4/5 +4/5   Left +4/5 +4/5 +4/5 +4/5 +4/5 +4/5     IMAGING:     Left knee MRI from 03/09/2022 was personally reviewed with the patient and demonstrated:        FINDINGS: Frontal and lateral views of the left knee obtained. Interval total  left knee arthroplasty revision with overlying soft tissue edema and emphysema,  and skin staples. No obvious acute hardware competition.  No acute osseous  findings.     IMPRESSION     Interval total left knee arthroplasty revision with expected postsurgical  changes.     Lumbar spine MRI from 01/24/2018 was personally reviewed with the patient and demonstrated:  Results from [facility] on 01/24/18   MRI LUMB SPINE W WO CONT     Narrative MR lumbar spine with and without contrast    CPT CODE: 58952    HISTORY: Chronic and worsening low back pain with left lower extremity pain. Increasing weakness. Remote history of surgeries. No recent injury. COMPARISON: MRI January 2013. TECHNIQUE: Lumbar spine scanned with axial and sagittal T1W scans, axial and  sagittal T2W scans, and with post gadolinium axial and sagittal T1W scans. Contrast used: 10 cc Gadavist.    FINDINGS:     Prior laminotomies on the left at L4-5 and bilaterally at L5-S1. No fracture,  bone destruction, or fluid collection. Intact lordosis. Normal vertebral body heights. Small less than 5 mm low signal  focus within anterior L4, developed since 2013. No similar focus elsewhere. No  aggressive features. Otherwise generally fatty marrow signal with some chronic  fatty endplate changes straddling L5-S1. Moderate to severe disc space narrowing  and disc desiccation of that level. Mild to moderate narrowing and desiccation  of L3-4 and L4-5. Conus at T12-L1. Axial imaging correlation:    T12-L1:  Patent canal and foramina. L1-L2: Patent canal and foramina. L2-L3: Patent canal and foramina. L3-L4: Broad-based disc osteophyte complex. Bilateral facet arthropathy with  ligamentum flavum thickening and buckling. Moderate concentric spinal stenosis. Particular narrowing of lateral recesses with transient distortion of the  crossing L4 nerves. Axial T2 image 20. Patent foramina.  Progression of  degenerative findings. L4-L5: Postoperative level. Mild broad-based disc osteophyte complex with a  small amount of enhancing scar tissue. Hypertrophic facet arthropathy. Moderate  spinal stenosis. Narrowing of the lateral recesses left more than right.   Transient distortion of the crossing nerves particularly left L5. Axial T2 image  13. Mild foraminal stenoses.  Unchanged. L5-S1: Postoperative level. Broad-based disc osteophyte complex with enhancing  scar tissue centrally. Hypertrophic facet arthropathy. No significant spinal  stenosis. Moderately severe bilateral foraminal stenoses. Unchanged. Incidental imaging of regional soft tissues unremarkable.                  Impression IMPRESSION:    1. Some progression of degenerative disc and facet disease at L3-4, with  moderate spinal stenosis and potentially impingement of the crossing L4 nerves,  left more than right. 2. Stable postsurgical and degenerative findings at L4-5 and L5-S1.          Left shoulder MRI from 11/12/2021 was personally reviewed with the patient and demonstrated:     FINDINGS:     Rotator Cuff: Full-thickness tear of the supraspinatus with fluid-filled tendon defect measuring 3.7 cm in transverse dimension. Moderate infraspinatus tendinosis. Mild subscapularis tendinosis. Teres minor appears intact. Mild fatty change of the supraspinatus and infraspinatus.      Subacromial Outlet: Moderate degenerative changes of the acromioclavicular joint. Moderate narrowing. Acromioclavicular joint effusion. Type 2 acromion. No os acromiale.     Glenohumeral joint: No evidence of dislocation.     Long Head of the Biceps Tendon: Full-thickness tear of the intra-articular portion with 8 cm of proximal tendon retraction. Moderate distension of the biceps tendon sheath.      Glenoid Labrum: Maceration of the superior labrum. No paralabral cyst.      Osseous Structures: No bone contusion or Sachs-Hill deformity.     Soft Tissues: Moderate joint effusion with synovitis. Moderate distension of the subacromial subdeltoid bursa.  Distension of the subcoracoid bursa.      IMPRESSION:      1. Full-thickness tear of the supraspinatus with fluid-filled tendon defect measuring 3.7 cm.  Tendon fibers are retracted to the level of the acromioclavicular joint. Mild fatty change of the supraspinatus and infraspinatus. 2. Moderate infraspinatus and mild subscapularis tendinosis. 3. Moderate narrowing of the subacromial outlet. 4. Fill-thickness retracted tear of the intra-articular biceps tendon. Tendon fibers are retracted past the bicipital groove, at least 8 cm from the biceps anchor insertion. 5. Moderate joint effusion with synovitis.   6. Moderate distension of the subacromial subdeltoid bursa.

## 2022-08-05 NOTE — PROGRESS NOTES
[facility] - [facility] [employer] [facility] [employer]  [place]  [address] [facility]  [phone] fax    Physical Therapy Discharge Summary    Patient name: Mary Grace Parson Start of Care: 2022   Referral source: Allyson Morgan : 1955   Medical/Treatment Diagnosis: Left knee pain [M25.562]  Payor: [place] MEDICARE / Plan: [place] MEDICARE PART A & B / Product Type: Medicare /  Onset Date:3/9/22     Prior Hospitalization: see medical history Provider#: [id number]   Medications: Verified on Patient Summary List    Comorbidities:  HTN, arthritis, history of DVT, history of cancer  Prior Level of Function: Ind with ambulation, hiking, hunting, fishing, Ind with ADLs      Visits from Start of Care: 22    Missed Visits: 0    Reporting Period : 22 to 22    Summary of Care:  Goal: Patient will be independent with a comprehensive final home program for continued strengthening to improve ease of ambulation, balance and stairs upon discharge from therapy. Status at last note/certification: New goal  Status at discharge: Unable to formally assess    ASSESSMENT/RECOMMENDATIONS: Patient did not return to skilled outpatient PT following date of last recertification (). See last recertification for key functional changes.  At this time, patient is appropriate for discharge from skilled outpatient PT.    [x]Discontinue therapy: [x]Patient has reached or is progressing toward set goals      []Patient is non-compliant or has abdicated      []Due to lack of appreciable progress towards set goals    Jake Baum, PT 2022 10:53 AM

## 2022-08-24 ENCOUNTER — OFFICE VISIT (OUTPATIENT)
Dept: ORTHOPEDIC SURGERY | Age: 67
End: 2022-08-24
Payer: MEDICARE

## 2022-08-24 VITALS
HEIGHT: 70 IN | TEMPERATURE: 97.5 F | OXYGEN SATURATION: 97 % | RESPIRATION RATE: 16 BRPM | WEIGHT: 236 LBS | HEART RATE: 80 BPM | BODY MASS INDEX: 33.79 KG/M2

## 2022-08-24 DIAGNOSIS — M47.812 CERVICAL FACET JOINT SYNDROME: Primary | ICD-10-CM

## 2022-08-24 DIAGNOSIS — M62.838 MUSCLE SPASM: ICD-10-CM

## 2022-08-24 DIAGNOSIS — R11.0 NAUSEA: ICD-10-CM

## 2022-08-24 DIAGNOSIS — Z79.01 WARFARIN ANTICOAGULATION: ICD-10-CM

## 2022-08-24 DIAGNOSIS — M50.30 DDD (DEGENERATIVE DISC DISEASE), CERVICAL: ICD-10-CM

## 2022-08-24 DIAGNOSIS — M54.2 CERVICAL PAIN: ICD-10-CM

## 2022-08-24 PROCEDURE — G8417 CALC BMI ABV UP PARAM F/U: HCPCS | Performed by: PHYSICAL MEDICINE & REHABILITATION

## 2022-08-24 PROCEDURE — G8427 DOCREV CUR MEDS BY ELIG CLIN: HCPCS | Performed by: PHYSICAL MEDICINE & REHABILITATION

## 2022-08-24 PROCEDURE — G8536 NO DOC ELDER MAL SCRN: HCPCS | Performed by: PHYSICAL MEDICINE & REHABILITATION

## 2022-08-24 PROCEDURE — G8756 NO BP MEASURE DOC: HCPCS | Performed by: PHYSICAL MEDICINE & REHABILITATION

## 2022-08-24 PROCEDURE — 72040 X-RAY EXAM NECK SPINE 2-3 VW: CPT | Performed by: PHYSICAL MEDICINE & REHABILITATION

## 2022-08-24 PROCEDURE — 3017F COLORECTAL CA SCREEN DOC REV: CPT | Performed by: PHYSICAL MEDICINE & REHABILITATION

## 2022-08-24 PROCEDURE — 1124F ACP DISCUSS-NO DSCNMKR DOCD: CPT | Performed by: PHYSICAL MEDICINE & REHABILITATION

## 2022-08-24 PROCEDURE — 1101F PT FALLS ASSESS-DOCD LE1/YR: CPT | Performed by: PHYSICAL MEDICINE & REHABILITATION

## 2022-08-24 PROCEDURE — 99214 OFFICE O/P EST MOD 30 MIN: CPT | Performed by: PHYSICAL MEDICINE & REHABILITATION

## 2022-08-24 PROCEDURE — G8432 DEP SCR NOT DOC, RNG: HCPCS | Performed by: PHYSICAL MEDICINE & REHABILITATION

## 2022-08-24 RX ORDER — ONDANSETRON 4 MG/1
4 TABLET, FILM COATED ORAL
Qty: 20 TABLET | Refills: 2 | Status: SHIPPED | OUTPATIENT
Start: 2022-08-24

## 2022-08-24 NOTE — PROGRESS NOTES
Chief Complaint   Patient presents with    Neck Pain       Pt preferred language for health care discussion is english. Is someone accompanying this pt? no    Is the patient using any DME equipment during [address]? cane    Depression Screening:  3 most recent [facility] [address] Screens 7/5/2022 4/20/2022 1/11/2022 1/5/2022 12/1/2021 9/10/2021 9/8/2021   PHQ Not Done - - - - - - -   Little interest or pleasure in doing things Not at all Not at all Not at all Not at all Not at all Not at all Not at all   Feeling down, depressed, irritable, or hopeless Not at all Not at all Not at all Not at all Not at all Not at all Not at all   Total Score PHQ 2 0 0 0 0 0 0 0       Learning Assessment:  Learning Assessment 2/8/2019 11/6/2018 9/25/2015   PRIMARY LEARNER Patient Patient Patient   HIGHEST LEVEL OF EDUCATION - PRIMARY LEARNER  - GRADUATED HIGH SCHOOL OR GED GRADUATED [address] PRIMARY LEARNER - [address] [facility] CAREGIVER - No No   PRIMARY [address] [facility]    NEED - - No   LEARNER PREFERENCE PRIMARY DEMONSTRATION DEMONSTRATION DEMONSTRATION   ANSWERED BY Patient patient patient   RELATIONSHIP SELF SELF SELF       Abuse Screening:  Abuse Screening Questionnaire 11/6/2018 7/3/2018 2/15/2016   Do you ever feel afraid of your partner? N N N   Are you in a relationship with someone who physically or mentally threatens you? N N N   Is it safe for you to go home? Y Y Y       Fall Risk  Fall Risk Assessment, last 12 mths 7/5/2022   Able to walk? Yes   Fall in past 12 months? 1   Do you feel unsteady? -   Are you worried about falling -   Is TUG test greater than 12 seconds? -   Is the gait abnormal? -   Number of falls in past 12 months 1   Fall with injury? 0           Advance Directive:  1. Do you have an advance directive in place? Patient Reply:no    2. If not, would you like material regarding how to put one in place? Patient Reply: no    2. Per patient no changes to their ACP contact no. Coordination of Care:  1. Have you been to the ER, urgent care clinic since your last visit? Hospitalized since your last visit? no    2. Have you seen or consulted any other health care providers outside of the [address] [place] [address] since your last visit? Include any pap smears or colon screening.  no

## 2022-08-24 NOTE — PROGRESS NOTES
[facility]  [address] [place], [facility] [address]  Phone: [phone]  Fax: [phone]    Pt's YOB: 1955    ASSESSMENT   Diagnoses and all orders for this visit:    1. Cervical facet joint syndrome  -     MRI CERV SPINE WO CONT; Future    2. Muscle spasm  -     MRI CERV SPINE WO CONT; Future    3. DDD (degenerative disc disease), cervical  -     MRI CERV SPINE WO CONT; Future    4. Nausea  -     ondansetron hcl (Zofran) 4 mg tablet; Take 1 Tablet by mouth every eight (8) hours as needed for Nausea or Vomiting. 5. Cervical pain  -     AMB POC XRAY, SPINE, CERVICAL; 2 OR 3  -     MRI CERV SPINE WO CONT; Future    6. Warfarin anticoagulation         IMPRESSION AND PLAN:  Pt is a 68 yo male with hx of cervical and lumbar pain with progressive cervical pain unresponsive to MDP; treatments including cervical facet injections and RFA were reviewed; pt agrees to proceed with cervical MRI for development of treatment plan. 1) Pt was given information on cervical facet injections and RFA. 2) Cervical 2V x-rays were obtained in the office  3) Cervical MRI was ordered to assess for stenosis, inflammation and impingement  4) Mr. Sterling Fermin has a reminder for a \"due or due soon\" health maintenance. I have asked that he contact his primary care provider, Ollie Morris NP, for follow-up on this health maintenance. 5)  demonstrated consistency with prescribing. 6) Pt will continue with Skelaxin and may use Fioricet as needed  7) Pt is not a candidate for NSAIDs due to use of coumadin  8) Pt was given a prescription for Zofran for nausea. Follow-up and Dispositions    Return in about 4 weeks (around 9/21/2022) for Diagnostic Test follow up. HISTORY OF PRESENT ILLNESS:  Michael Arnold is a 69 y.o.  male with history of cervical / lumbar pain and presents to the office today for evaluation of worsening cervical pain.   Pt reports 3 week hx of worsening cervical pain; pt took MDP and had some relief of his lumbar pain but no significant relief of his cervical pain. He has pain across the upper back / neck with radiation to the base of his skull. He has been using moist heat and has taken Skelaxin and has had some relief with Fioricet. He also has used a special pillow with minimal relief. He notes that lying down is worse. He denies any radicular symptoms arm weakness but has noted some decreased fine motor. He also has had some balance issues at times and also has been using a cane. He also reports some difficulty with his knee. He also has had some nausea and would like medication for this - he has used Gaviscon with minimal improvement. He also notes his wife has been having some health issues recently. Pt agrees to proceed with cervical MRI and continue with current meds including Fioricet and Skelaxin; cervical facet injections, RFA and other treatments discussed. Pain Scale: 5/10    PCP: Sofy Espinoza NP     Past Medical History:   Diagnosis Date    Arthritis     Chronic pain     knee and shoulder    DVT (deep venous thrombosis) ([address] 1992    post sx.   R LEG    DVT (deep venous thrombosis) ([facility]) 2000    POST BACK SX. 2- LEFT LEG    GERD (gastroesophageal reflux disease)     Headache(784.0)     everyday    High cholesterol     Hypertension     Prostate cancer ([address] 2018    Pure hypercholesterolemia     Spinal stenosis     [facility]         Social History     Socioeconomic History    Marital status:      Spouse name: Not on file    Number of children: Not on file    Years of education: Not on file    Highest education level: Not on file   Occupational History    Not on file   Tobacco Use    Smoking status: Never    Smokeless tobacco: Never   Vaping Use    Vaping Use: Never used   Substance and Sexual Activity    Alcohol use: No    Drug use: Never    Sexual activity: Not Currently Other Topics Concern     Service Not Asked    Blood Transfusions Not Asked    Caffeine Concern Not Asked    Occupational Exposure Not Asked    Hobby Hazards Not Asked    Sleep Concern Not Asked    Stress Concern Not Asked    Weight Concern Not Asked    Special Diet Not Asked    Back Care Not Asked    Exercise Not Asked    Bike Helmet Not Asked    Seat Belt Not Asked    Self-Exams Not Asked   Social History Narrative    Not on file     Social Determinants of Health     Financial Resource Strain: Not on file   Food Insecurity: Not on file   Transportation Needs: Not on file   Physical Activity: Not on file   Stress: Not on file   Social Connections: Not on file   Intimate Partner Violence: Not on file   Housing Stability: Not on file       Current Outpatient Medications   Medication Sig Dispense Refill    ondansetron hcl (Zofran) 4 mg tablet Take 1 Tablet by mouth every eight (8) hours as needed for Nausea or Vomiting. 20 Tablet 2    metaxalone (Skelaxin) 800 mg tablet Take 1 Tablet by mouth three (3) times daily. Take as needed  Indications: muscle spasm 90 Tablet 3    gabapentin (NEURONTIN) 100 mg capsule Take 2 capsules at bedtime as directed  Indications: neuropathic pain 180 Capsule 1    clotrimazole-betamethasone (LOTRISONE) 1-0.05 % lotion Apply  to affected area two (2) times a day. 30 mL 0    allopurinoL (ZYLOPRIM) 100 mg tablet Take 1 Tablet by mouth two (2) times a day. 60 Tablet 0    L gasseri/B bifidum/B longum (Par8o PO) Take 1 Tablet by mouth nightly. lansoprazole (PREVACID) 30 mg capsule Take 30 mg by mouth daily. warfarin (COUMADIN) 5 mg tablet TAKE 2 AND 1/2 TABLETS BY MOUTH DAILY OR AS DIRECTED (Patient taking differently: Take  by mouth daily.  Patient takes 2  TABLETS BY MOUTH Daily or as directed) 75 Tab 0    lisinopril-hydroCHLOROthiazide (PRINZIDE, ZESTORETIC) 20-12.5 mg per tablet TAKE 1 TABLET BY MOUTH DAILY (Patient taking differently: Take 1 Tablet by mouth daily. TAKE 1 TABLET BY MOUTH DAILY) 90 Tab 1    labetalol (NORMODYNE) 100 mg tablet TAKE 1 TABLET BY MOUTH TWICE DAILY. STOP VERAPAMIL (Patient taking differently: Take  by mouth two (2) times a day.) 180 Tab 0    butalbital-acetaminophen-caff (FIORICET) -40 mg per capsule 2 cap three times per day as needed for headache (Patient taking differently: Take 1 Capsule by mouth daily. 2 cap three times per day as needed for headache) 180 Cap 1    ALPRAZolam (XANAX) 0.5 mg tablet Take one half(1/2) tab to one(1) tab by mouth at bedtime as needed for sleep (Patient taking differently: Take  by mouth nightly as needed. Take one half(1/2) tab to one(1) tab by mouth at bedtime as needed for sleep) 30 Tab 0    montelukast (SINGULAIR) 10 mg tablet TAKE 1 TABLET BY MOUTH EVERY DAY 90 Tab 0    acetaminophen (TYLENOL) 500 mg tablet Take 1,000 mg by mouth every six (6) hours as needed for Pain. doxycycline (MONODOX) 100 mg capsule Take 100 mg by mouth two (2) times a day. (Patient not taking: No sig reported)      methylPREDNISolone (MEDROL DOSEPACK) 4 mg tablet Per dose pack instructions (Patient not taking: No sig reported) 1 Dose Pack 0       Allergies   Allergen Reactions    Amoxicillin Itching    Augmentin [Amoxicillin-Pot Clavulanate] Itching    Chlorhexidine Unknown (comments)    Chlorhexidine Towelette Itching    Hibiclens [Chlorhexidine Gluconate] Itching    Milk Containing Products Diarrhea    Nsaids (Non-Steroidal Anti-Inflammatory Drug) Other (comments)     On blood thinner, contraindicated. Penicillins Rash    Requip [Ropinirole] Nausea and Vomiting    Simvastatin Other (comments)         REVIEW OF SYSTEMS    Constitutional: Negative for fever, chills, or weight change. Respiratory: Negative for cough or shortness of breath. Cardiovascular: Negative for chest pain or palpitations. Gastrointestinal: Negative for acid reflux, change in bowel habits, or constipation.   Genitourinary: Negative for dysuria and flank pain. Musculoskeletal: Positive for lumbar / cervical  pain. Skin: Negative for rash. Neurological: Negative for headaches, dizziness, or numbness. Endo/Heme/Allergies: Negative for increased bruising. Psychiatric/Behavioral: Negative for difficulty with sleep. As per HPI    PHYSICAL EXAMINATION  Visit Vitals  Pulse 80   Temp 97.5 °F (36.4 °C) (Temporal)   Resp 16   Ht 5' 10\" (1.778 m)   Wt 236 lb (107 kg)   SpO2 97%   BMI 33.86 kg/m²       Constitutional: Awake, alert, and in no acute distress. Neurological: 1+ symmetrical DTRs in the upper extremities. 1+ symmetrical DTRs in the lower extremities. Sensation to light touch is intact. Negative Lepe's sign bilaterally. Skin: warm, dry, and intact. Musculoskeletal:  Moderate pain with extension, axial loading, or forward flexion. Tightness across the trapezius, decreased side to side flexion (L>R) with more pain with left cervical pain; No pain with internal or external rotation of his hips. Negative straight leg raise bilaterally; pt ambulates with the assistance of a cane      Biceps  Triceps Deltoids Wrist Ext Wrist Flex Hand Intrin   Right +4/5 +4/5 +4/5 +4/5 +4/5 +4/5   Left +4/5 +4/5 +4/5 +4/5 +4/5 +4/5      Hip Flex  Quads Hamstrings Ankle DF EHL Ankle PF   Right +4/5 +4/5 +4/5 +4/5 +4/5 +4/5   Left +4/5 +4/5 +4/5 +4/5 +4/5 +4/5     IMAGING:  Cervical 2V x-rays 08/24/2022 were personally reviewed and demonstrated :  DDD C5/6, C6/7, C7/T1 and degenerative facets    Lumbar spine MRI from 01/24/2018 was personally reviewed with the patient and demonstrated:  Results from [facility] on 01/24/18   MRI LUMB SPINE W WO CONT     Narrative MR lumbar spine with and without contrast    CPT CODE: 15226    HISTORY: Chronic and worsening low back pain with left lower extremity pain. Increasing weakness. Remote history of surgeries. No recent injury. COMPARISON: MRI January 2013.     TECHNIQUE: Lumbar spine scanned with axial and sagittal T1W scans, axial and  sagittal T2W scans, and with post gadolinium axial and sagittal T1W scans. Contrast used: 10 cc Gadavist.    FINDINGS:     Prior laminotomies on the left at L4-5 and bilaterally at L5-S1. No fracture,  bone destruction, or fluid collection. Intact lordosis. Normal vertebral body heights. Small less than 5 mm low signal  focus within anterior L4, developed since 2013. No similar focus elsewhere. No  aggressive features. Otherwise generally fatty marrow signal with some chronic  fatty endplate changes straddling L5-S1. Moderate to severe disc space narrowing  and disc desiccation of that level. Mild to moderate narrowing and desiccation  of L3-4 and L4-5. Conus at T12-L1. Axial imaging correlation:    T12-L1:  Patent canal and foramina. L1-L2: Patent canal and foramina. L2-L3: Patent canal and foramina. L3-L4: Broad-based disc osteophyte complex. Bilateral facet arthropathy with  ligamentum flavum thickening and buckling. Moderate concentric spinal stenosis. Particular narrowing of lateral recesses with transient distortion of the  crossing L4 nerves. Axial T2 image 20. Patent foramina. Progression of  degenerative findings. L4-L5: Postoperative level. Mild broad-based disc osteophyte complex with a  small amount of enhancing scar tissue. Hypertrophic facet arthropathy. Moderate  spinal stenosis. Narrowing of the lateral recesses left more than right. Transient distortion of the crossing nerves particularly left L5. Axial T2 image  13. Mild foraminal stenoses. Unchanged. L5-S1: Postoperative level. Broad-based disc osteophyte complex with enhancing  scar tissue centrally. Hypertrophic facet arthropathy. No significant spinal  stenosis. Moderately severe bilateral foraminal stenoses. Unchanged. Incidental imaging of regional soft tissues unremarkable. Impression IMPRESSION:    1.  Some progression of degenerative disc and facet disease at L3-4, with  moderate spinal stenosis and potentially impingement of the crossing L4 nerves,  left more than right. 2. Stable postsurgical and degenerative findings at L4-5 and L5-S1. Left shoulder MRI from 11/12/2021 was personally reviewed with the patient and demonstrated:     FINDINGS:     Rotator Cuff: Full-thickness tear of the supraspinatus with fluid-filled tendon defect measuring 3.7 cm in transverse dimension. Moderate infraspinatus tendinosis. Mild subscapularis tendinosis. Teres minor appears intact. Mild fatty change of the supraspinatus and infraspinatus. Subacromial Outlet: Moderate degenerative changes of the acromioclavicular joint. Moderate narrowing. Acromioclavicular joint effusion. Type 2 acromion. No os acromiale. Glenohumeral joint: No evidence of dislocation. Long Head of the Biceps Tendon: Full-thickness tear of the intra-articular portion with 8 cm of proximal tendon retraction. Moderate distension of the biceps tendon sheath. Glenoid Labrum: Maceration of the superior labrum. No paralabral cyst.      Osseous Structures: No bone contusion or Sachs-Hill deformity. Soft Tissues: Moderate joint effusion with synovitis. Moderate distension of the subacromial subdeltoid bursa. Distension of the subcoracoid bursa. IMPRESSION:      1. Full-thickness tear of the supraspinatus with fluid-filled tendon defect measuring 3.7 cm. Tendon fibers are retracted to the level of the acromioclavicular joint. Mild fatty change of the supraspinatus and infraspinatus. 2. Moderate infraspinatus and mild subscapularis tendinosis. 3. Moderate narrowing of the subacromial outlet. 4. Fill-thickness retracted tear of the intra-articular biceps tendon. Tendon fibers are retracted past the bicipital groove, at least 8 cm from the biceps anchor insertion. 5. Moderate joint effusion with synovitis. 6. Moderate distension of the subacromial subdeltoid bursa.

## 2022-08-26 ENCOUNTER — OFFICE VISIT (OUTPATIENT)
Dept: ORTHOPEDIC SURGERY | Age: 67
End: 2022-08-26
Payer: MEDICARE

## 2022-08-26 VITALS
HEIGHT: 70 IN | BODY MASS INDEX: 33.79 KG/M2 | OXYGEN SATURATION: 98 % | WEIGHT: 236 LBS | HEART RATE: 84 BPM | TEMPERATURE: 97.5 F | RESPIRATION RATE: 18 BRPM

## 2022-08-26 DIAGNOSIS — Z96.652 S/P REVISION OF TOTAL KNEE, LEFT: Primary | ICD-10-CM

## 2022-08-26 DIAGNOSIS — M25.562 LEFT KNEE PAIN, UNSPECIFIED CHRONICITY: ICD-10-CM

## 2022-08-26 PROCEDURE — 3017F COLORECTAL CA SCREEN DOC REV: CPT | Performed by: PHYSICIAN ASSISTANT

## 2022-08-26 PROCEDURE — G8432 DEP SCR NOT DOC, RNG: HCPCS | Performed by: PHYSICIAN ASSISTANT

## 2022-08-26 PROCEDURE — G8417 CALC BMI ABV UP PARAM F/U: HCPCS | Performed by: PHYSICIAN ASSISTANT

## 2022-08-26 PROCEDURE — 1101F PT FALLS ASSESS-DOCD LE1/YR: CPT | Performed by: PHYSICIAN ASSISTANT

## 2022-08-26 PROCEDURE — G8756 NO BP MEASURE DOC: HCPCS | Performed by: PHYSICIAN ASSISTANT

## 2022-08-26 PROCEDURE — G8427 DOCREV CUR MEDS BY ELIG CLIN: HCPCS | Performed by: PHYSICIAN ASSISTANT

## 2022-08-26 PROCEDURE — 1124F ACP DISCUSS-NO DSCNMKR DOCD: CPT | Performed by: PHYSICIAN ASSISTANT

## 2022-08-26 PROCEDURE — G8536 NO DOC ELDER MAL SCRN: HCPCS | Performed by: PHYSICIAN ASSISTANT

## 2022-08-26 PROCEDURE — 99214 OFFICE O/P EST MOD 30 MIN: CPT | Performed by: PHYSICIAN ASSISTANT

## 2022-08-26 NOTE — PROGRESS NOTES
[address] [facility], [address] [facility]  [phone]           Patient: Lizzy Rodriguez                MRN: [id number]       SSN: [id number]  YOB: 1955        AGE: 69 y.o. SEX: male  Body mass index is 33.86 kg/m². PCP: Evelio Delgadillo NP  08/26/22      This office note has been dictated. REVIEW OF SYSTEMS:  Constitutional: Negative for fever, chills, weight loss and malaise/fatigue. HENT: Negative. Eyes: Negative. Respiratory: Negative. Cardiovascular: Negative. Gastrointestinal: No bowel incontinence or constipation. Genitourinary: No bladder incontinence or saddle anesthesia. Skin: Negative. Neurological: Negative. Endo/Heme/Allergies: Negative. Psychiatric/Behavioral: Negative. Musculoskeletal: As per HPI above. Past Medical History:   Diagnosis Date    Arthritis     Chronic pain     knee and shoulder    DVT (deep venous thrombosis) ([facility] [address] 1992    post sx. R LEG    DVT (deep venous thrombosis) ([facility] 2000    POST BACK SX. 2- LEFT LEG    GERD (gastroesophageal reflux disease)     Headache(784.0)     everyday    High cholesterol     Hypertension     Prostate cancer ([facility] [address] 2018    Pure hypercholesterolemia     Spinal stenosis     Thromboembolus ([facility])          Current Outpatient Medications:     ondansetron hcl (Zofran) 4 mg tablet, Take 1 Tablet by mouth every eight (8) hours as needed for Nausea or Vomiting., Disp: 20 Tablet, Rfl: 2    metaxalone (Skelaxin) 800 mg tablet, Take 1 Tablet by mouth three (3) times daily.  Take as needed  Indications: muscle spasm, Disp: 90 Tablet, Rfl: 3    gabapentin (NEURONTIN) 100 mg capsule, Take 2 capsules at bedtime as directed  Indications: neuropathic pain, Disp: 180 Capsule, Rfl: 1    clotrimazole-betamethasone (LOTRISONE) 1-0.05 % lotion, Apply  to affected area two (2) times a day., Disp: 30 mL, Rfl: 0    allopurinoL (ZYLOPRIM) 100 mg tablet, Take 1 Tablet by mouth two (2) times a day., Disp: 60 Tablet, Rfl: 0    L gasseri/B bifidum/B longum ([facility]), Take 1 Tablet by mouth nightly., Disp: , Rfl:     lansoprazole (PREVACID) 30 mg capsule, Take 30 mg by mouth daily. , Disp: , Rfl:     warfarin (COUMADIN) 5 mg tablet, TAKE 2 AND 1/2 TABLETS BY MOUTH DAILY OR AS DIRECTED (Patient taking differently: Take  by mouth daily. Patient takes 2  TABLETS BY MOUTH Daily or as directed), Disp: 75 Tab, Rfl: 0    lisinopril-hydroCHLOROthiazide (PRINZIDE, ZESTORETIC) 20-12.5 mg per tablet, TAKE 1 TABLET BY MOUTH DAILY (Patient taking differently: Take 1 Tablet by mouth daily. TAKE 1 TABLET BY MOUTH DAILY), Disp: 90 Tab, Rfl: 1    labetalol (NORMODYNE) 100 mg tablet, TAKE 1 TABLET BY MOUTH TWICE DAILY. STOP VERAPAMIL (Patient taking differently: Take  by mouth two (2) times a day.), Disp: 180 Tab, Rfl: 0    butalbital-acetaminophen-caff (FIORICET) -40 mg per capsule, 2 cap three times per day as needed for headache (Patient taking differently: Take 1 Capsule by mouth daily. 2 cap three times per day as needed for headache), Disp: 180 Cap, Rfl: 1    ALPRAZolam (XANAX) 0.5 mg tablet, Take one half(1/2) tab to one(1) tab by mouth at bedtime as needed for sleep (Patient taking differently: Take  by mouth nightly as needed. Take one half(1/2) tab to one(1) tab by mouth at bedtime as needed for sleep), Disp: 30 Tab, Rfl: 0    montelukast (SINGULAIR) 10 mg tablet, TAKE 1 TABLET BY MOUTH EVERY DAY, Disp: 90 Tab, Rfl: 0    acetaminophen (TYLENOL) 500 mg tablet, Take 1,000 mg by mouth every six (6) hours as needed for Pain., Disp: , Rfl:     doxycycline (MONODOX) 100 mg capsule, Take 100 mg by mouth two (2) times a day.  (Patient not taking: No sig reported), Disp: , Rfl:     methylPREDNISolone (MEDROL DOSEPACK) 4 mg tablet, Per dose pack instructions (Patient not taking: No sig reported), Disp: 1 Dose Pack, Rfl: 0    Allergies   Allergen Reactions    Amoxicillin Itching    Augmentin [Amoxicillin-Pot Clavulanate] Itching    Chlorhexidine Unknown (comments)    Chlorhexidine Towelette Itching    Hibiclens [Chlorhexidine Gluconate] Itching    Milk Containing Products Diarrhea    Nsaids (Non-Steroidal Anti-Inflammatory Drug) Other (comments)     On blood thinner, contraindicated.      Penicillins Rash    Requip [Ropinirole] Nausea and Vomiting    Simvastatin Other (comments)       Social History     Socioeconomic History    Marital status:      Spouse name: Not on file    Number of children: Not on file    Years of education: Not on file    Highest education level: Not on file   Occupational History    Not on file   Tobacco Use    Smoking status: Never    Smokeless tobacco: Never   Vaping Use    Vaping Use: Never used   Substance and Sexual Activity    Alcohol use: No    Drug use: Never    Sexual activity: Not Currently   Other Topics Concern     Service Not Asked    Blood Transfusions Not Asked    Caffeine Concern Not Asked    Occupational Exposure Not Asked    Hobby Hazards Not Asked    Sleep Concern Not Asked    Stress Concern Not Asked    Weight Concern Not Asked    Special Diet Not Asked    Back Care Not Asked    Exercise Not Asked    Bike Helmet Not Asked    Seat Belt Not Asked    Self-Exams Not Asked   Social History Narrative    Not on file     Social Determinants of Health     Financial Resource Strain: Not on file   Food Insecurity: Not on file   Transportation Needs: Not on file   Physical Activity: Not on file   Stress: Not on file   Social Connections: Not on file   Intimate Partner Violence: Not on file   Housing Stability: Not on file       Past Surgical History:   Procedure Laterality Date    HX HEENT Right 09/11/2018    eye surgery, macular     HX KNEE REPLACEMENT Left 2018    HX LUMBAR LAMINECTOMY  1992    HX LUMBAR LAMINECTOMY  2000    HX ORTHOPAEDIC  06-25-12    Right foot with excision of bursa and adipose tissue from fifth metatarsal base by Dr. Marcela Gonzales Left 03/09/2022    left knee revision    HX PROSTATECTOMY  11/2018    HX ROTATOR CUFF REPAIR Right 01/28/2019    by Dr. Rommel Robbins ARTHROSCOPY Left 12/2020    WORK RELATED INJURY           Patient seen evaluated today for his left knee revision. He is now approximate 5-month status post surgery and overall doing well. He is pleased with results. He does have some stiffness after being seated. He has some swelling at times. He denies any start of pain. There are no feelings of instability. He would like to do little more physical therapy. Patient denies recent fevers, chills, chest pain, SOB, or injuries. No recent systemic changes noted. A 12-point review of systems is performed today. Pertinent positives are noted. All other systems reviewed and otherwise are negative. Physical exam: General: Alert and oriented x3, nad.  well-developed, well nourished. normal affect, AF. NC/AT, EOMI, neck supple, trachea midline, no JVD present. Breathing is non-labored. Examination of lower extremities reveals pain-free range of motion the hips. There is no pain to palpation trochanter bursa. Negative straight leg raise. Negative calf tenderness. Negative Homans. No signs of DVT present. The left knee reveals skin intact. There is no erythema, ecchymosis or warmth. There is minimal swelling. Negative effusion. No signs for infection or cellulitis present. Range of motion is -2-110 degrees. Patella tracks nicely. Stability is good. Negative calf tenderness. Negative Homans. No signs of DVT present. Mild edema distally. Assessment: Status post revision left knee replacement    Plan: At this point, the patient will be set up with more physical therapy. He is instructed on home exercise program as well. He will continue with his Voltaren gel. He does continue on Coumadin. He will continue with ice therapy.   We will see him back in about 6 to 8 weeks time for evaluation. He will call with any questions or concerns arise.             JR Willie GATES, PARICHARD, ATC

## 2022-09-07 ENCOUNTER — HOSPITAL ENCOUNTER (OUTPATIENT)
Dept: PHYSICAL THERAPY | Age: 67
Discharge: HOME | End: 2022-09-07
Attending: PHYSICIAN ASSISTANT
Payer: MEDICARE

## 2022-09-07 DIAGNOSIS — Z96.652 S/P REVISION OF TOTAL KNEE, LEFT: Primary | ICD-10-CM

## 2022-09-07 PROCEDURE — 97110 THERAPEUTIC EXERCISES: CPT

## 2022-09-07 PROCEDURE — 97161 PT EVAL LOW COMPLEX 20 MIN: CPT

## 2022-09-07 NOTE — PROGRESS NOTES
PT DAILY TREATMENT NOTE     Patient Name: Sturat Lares  Date:2022  : 1955  [x]  Patient  Verified  Payor: [place] MEDICARE / Plan: [place] MEDICARE PART A & B / Product Type: Medicare /    In time:945  Out time:1030  Total Treatment Time (min): 45  Visit #: 1 of     Medicare/BCBS Only   Total Timed Codes (min):  25 1:1 Treatment Time:  25       Treatment Area: S/P revision of total knee, left [Z96.652]    SUBJECTIVE  Pain Level (0-10 scale): 1-2/10  Any medication changes, allergies to medications, adverse drug reactions, diagnosis change, or new procedure performed?: [x] No    [] Yes (see summary sheet for update)  Subjective functional status/changes:   [] No changes reported     Patient seen evaluated today for his left knee revision. He is now approximate 5-month status post surgery and overall doing well. He is pleased with results. He does have some stiffness after being seated. He has some swelling at times. He denies any start of pain. There are no feelings of instability. He would like to do little more physical therapy. per MD    OBJECTIVE    15 min []Eval                  []Re-Eval       25 min Therapeutic Exercise:  [] See flow sheet :   Rationale: increase ROM, increase strength, improve coordination, and improve balance to improve the patients ability to ease with adl's            With   [] TE   [] TA   [] neuro   [] other: Patient Education: [x] Review HEP    [] Progressed/Changed HEP based on:   [] positioning   [] body mechanics   [] transfers   [] heat/ice application    [] other:      Other Objective/Functional Measures: see eval     Pain Level (0-10 scale) post treatment: 1-2/10    ASSESSMENT/Changes in Function: see poc    Patient will continue to benefit from skilled PT services to modify and progress therapeutic interventions, address functional mobility deficits, address ROM deficits, address strength deficits, analyze and address soft tissue restrictions, analyze and cue movement patterns, analyze and modify body mechanics/ergonomics, assess and modify postural abnormalities, address imbalance/dizziness, and instruct in home and community integration to attain remaining goals.      [x]  See Plan of Care  []  See progress note/recertification  []  See Discharge Summary         Progress towards goals / Updated goals:  See poc    PLAN  [x]  Upgrade activities as tolerated     [x]  Continue plan of care  []  Update interventions per flow sheet       []  Discharge due to:_  []  Other:_      Manuela Amaya, PT 9/7/2022  9:47 AM    Future Appointments   Date Time Provider Dex Hatfield   9/9/2022  2:00 PM [facility] MRI RM 2 [facility]   9/27/2022 10:00 AM Tammy Stratton MD [facility] BS AMB   10/4/2022  1:45 PM Tammy Stratton MD [facility] AMB   10/18/2022  1:00 PM [facility] NURSE [address]   10/25/2022  1:00 PM Amor, Willis Chung MD [address]   11/18/2022  1:10 PM Alexandra Marcos PA-C [facility] BS AMB

## 2022-09-07 NOTE — PROGRESS NOTES
[facility] - Rena Ayala  [address] [facility]  [phone] fax    Plan of Care/ Statement of Necessity for Physical Therapy Services    Patient name: Emily Fernández Start of Care: 2022   Referral source: Hilda Parrish : 1955    Medical Diagnosis: S/P revision of total knee, left [Z96.652]  Payor: VA MEDICARE / Plan: VA MEDICARE PART A & B / Product Type: Medicare /  Onset Date:3/2022 Left TKR    Treatment Diagnosis: Left Knee Pain   Prior Hospitalization: see medical history Provider#: [id number]   Medications: Verified on Patient summary List    Comorbidities: HBP   Prior Level of Function: Likes to go [address], [employer] and [employer]. The Plan of Care and following information is based on the information from the initial evaluation. Assessment/ key information: Pt is a 69 yr old male s/p Left Total Knee Revision, 3/2022. Pt reports 1-2/10 pain that is intermittent. Pt had PT previously with positive results, however he would like to be able to return to PLOF like [employer], Fishing and Camping. Pt ambulates with a SPC with mild Antalgic Gait. He presents with decreased knee/Hip and glute strength, Decreased ROM and poor left LE balance. Pt reports most discomfort with initially standing up and prolonged ambulation tolerance. Pt presents with decreased patella mobility, quad and HS tightness. Pt will benefit from skilled PT to improve ROM, strength, balance and ambulation tolerance to be able to return to previous activities as tolerated.          Evaluation Complexity History LOW Complexity : Zero comorbidities / personal factors that will impact the outcome / POC; Examination LOW Complexity : 1-2 Standardized tests and measures addressing body structure, function, activity limitation and / or participation in recreation  ;Presentation LOW Complexity : Stable, uncomplicated  ;Clinical Decision Making MEDIUM Complexity : FOTO score of 26-74  Overall Complexity Rating: LOW   Problem List: pain affecting function, decrease ROM, decrease strength, edema affecting function, impaired gait/ balance, decrease ADL/ functional abilitiies, decrease activity tolerance, decrease flexibility/ joint mobility, and decrease transfer abilities   Treatment Plan may include any combination of the following: Therapeutic exercise, Therapeutic activities, Neuromuscular re-education, Physical agent/modality, Gait/balance training, Manual therapy, Patient education, Self Care training, Home safety training, and Stair training  Patient / Family readiness to learn indicated by: asking questions, trying to perform skills, and interest  Persons(s) to be included in education: patient (P)  Barriers to Learning/Limitations: None  Patient Goal (s): to strengthen, to be able to walk longer and to hike outside  Patient Self Reported Health Status: good  Rehabilitation Potential: good    Short Term Goals: To be accomplished in 1 weeks:   1. Pt will be compliant with HEP to continue to improve knee function. Status at evaluation/last progress note: Verbally recommended to roll left IT band and add SL Hip Abd, Ecc Leg ext. Long Term Goals: To be accomplished in 4 weeks:   Goal:Pt will increase FOTO score by 20  pts to improve function. Status at evaluation/last progress note:     2. Goal: Pt will increase Left Knee ROM flexion >110 deg to ease with ambulation function  Status at evaluation/last progress note: Left Knee ROM zuzshrl=951 deg     3. Goal: Pt will increase quad and HS  strength to 4+/5 to ease with activity tolerance. Status at evaluation/last progress note: Quad and HS strength =4/5 respectively. 4.   Goal: Pt will perform left SLS x10 sec to be able to return to ambulating uneven terrain. Status at evaluation/last progress note:Left SLS x 2 sec     5. Goal: Pt will perform sit to stand from various heights w/o pain to ease with transfers.    Status at evaluation/last progress note: Start up knee Pain with transfers. 6 Goal:Pt will have no difficulty Performing light activities around his home. Status at evaluation/last progress note: Pt has moderate difficulty per FOTO. Frequency / Duration: Patient to be seen 2-3 times per week for 4 weeks. Patient/ Caregiver education and instruction: Diagnosis, prognosis, self care, activity modification, brace/ splint application, and exercises   [x]  Plan of care has been reviewed with PTA    Certification Period: 9/7/22-10/6/22  Reny Parra, PT 9/7/2022 10:15 AM    ________________________________________________________________________    I certify that the above Therapy Services are being furnished while the patient is under my care. I agree with the treatment plan and certify that this therapy is necessary.     [de-identified] Signature:____________Date:_________TIME:________     Edward Huerta PA-C  ** Signature, Date and Time must be completed for valid certification **    Please sign and return to [facility] [address]  [phone] fax

## 2022-09-08 ENCOUNTER — HOSPITAL ENCOUNTER (OUTPATIENT)
Dept: PHYSICAL THERAPY | Age: 67
Discharge: HOME | End: 2022-09-08
Attending: PHYSICIAN ASSISTANT
Payer: MEDICARE

## 2022-09-08 PROCEDURE — 97110 THERAPEUTIC EXERCISES: CPT

## 2022-09-08 NOTE — PROGRESS NOTES
PT DAILY TREATMENT NOTE     Patient Name: Michael Arnold  Date:2022  : 1955  [x]  Patient  Verified  Payor: VA MEDICARE / Plan: VA MEDICARE PART A & B / Product Type: Medicare /    In time:3:50P  Out time:4:45P  Total Treatment Time (min): 54  Visit #: 2 of 12    Medicare/BCBS Only   Total Timed Codes (min):  45 1:1 Treatment Time:  45       Treatment Area: Left knee pain [M25.562]    SUBJECTIVE  Pain Level (0-10 scale): 2/10  Any medication changes, allergies to medications, adverse drug reactions, diagnosis change, or new procedure performed?: [x] No    [] Yes (see summary sheet for update)  Subjective functional status/changes:   [] No changes reported  Patient reports his knee feels better since evaluation. He has more pain with getting up in the morning (up to 3-4/10). He wants to get back to hiking and camping. He has some pain and tightness to the side of his thigh/hip. He has been keeping up with exercises since he was previously discharged from therapy. He is going to start walking with his wife since the weather is better. His BP was fluctuating; he stopped checking it as regularly because it was getting better. He is doing sit to stands, marches, LAQ, SLR, hip add iso, clamshells, and several other exercises at home. He stopped doing heel slides over the last 2 weeks and had difficulty tying/untying the band for hip abd iso. He feels his balance is not bad; he does not use the cane in the back yard but has a hill in the front so uses it then.  He only uses the cane in the home when he gets up [employer]y [employer]    OBJECTIVE    Modality rationale: decrease inflammation and decrease pain to improve the patients ability to perform ADLs with increased ease    Min Type Additional Details    [] Estim:  []Unatt       []IFC  []Premod                        []Other:  []w/ice   []w/heat  Position:  Location:    [] Estim: []Att    []TENS instruct  []NMES                    []Other:  []w/US   []w/ice []w/heat  Position:  Location:    []  Traction: [] Cervical       []Lumbar                       [] Prone          []Supine                       []Intermittent   []Continuous Lbs:  [] before manual  [] after manual    []  Ultrasound: []Continuous   [] Pulsed                           []1MHz   []3MHz W/cm2:  Location:    []  Iontophoresis with dexamethasone         Location: [] Take home patch   [] In clinic   10 [x]  Ice     []  heat  []  Ice massage  []  Laser   []  Anodyne Position: semi reclined with LEs elevated  Location: left knee    []  Laser with stim  []  Other:  Position:  Location:    []  Vasopneumatic Device    []  Right     []  Left  Pre-treatment girth:  Post-treatment girth:  Measured at (location):  Pressure:       [] lo [] med [] hi   Temperature: [] lo [] med [] hi   [x] Skin assessment post-treatment:  [x]intact []redness- no adverse reaction    []redness - adverse reaction:     45 min Therapeutic Exercise:  [x] See flow sheet :   Rationale: increase ROM, increase strength, improve coordination, and increase proprioception to improve the patients ability to perform ADLs with increased ease            With   [] TE   [] TA   [] neuro   [] other: Patient Education: [x] Review HEP    [] Progressed/Changed HEP based on:   [] positioning   [] body mechanics   [] transfers   [] heat/ice application    [] other: table top/floor bike for home use     Other Objective/Functional Measures:     Min knee pain reported initially with sidelying ITB stretch initially      Min limp and left lateral trunk lean in left stance with ambulation    Fair control with standing TKE improved with min manual resistance     Step ups-decreased control and almost immediate hyperextension with activity leading with left even on 2\" step; manual resistance to prevent increased ext     Multiple seated rest breaks during session     Min to mod A for low case step overs on right for foot placement/clearance    Max difficulty performing low case step overs on left    Pain Level (0-10 scale) post treatment: 2/10    ASSESSMENT/Changes in Function: Initiated activities per flow sheet/POC. Patient with good overall tolerance to session. Patient continues to ambulate with SPC on uneven surfaces and after prolonged positioning due to instability due to increased stiffness/discomfort. He demonstrates increased tightness to left LE musculature and impaired left quad control contributing to instability with ambulation and difficulty with navigating stairs. He reports compliance with HEP at this time. Patient will continue to benefit from skilled PT services to modify and progress therapeutic interventions, address functional mobility deficits, address ROM deficits, address strength deficits, analyze and address soft tissue restrictions, analyze and cue movement patterns, analyze and modify body mechanics/ergonomics, assess and modify postural abnormalities, address imbalance/dizziness, and instruct in home and community integration to attain remaining goals. Progress towards goals / Updated goals:  Short Term Goals: To be accomplished in 1 weeks:              1. Pt will be compliant with HEP to continue to improve knee function. Status at evaluation/last progress note: Verbally recommended to roll left IT band and add SL Hip Abd, Ecc Leg ext. Progressing-performing other activities at home (see subjective 9/8/22)    Long Term Goals: To be accomplished in 4 weeks:              Goal:Pt will increase FOTO score by 20  pts to improve function. Status at evaluation/last progress note:     2. Goal: Pt will increase Left Knee ROM flexion >110 deg to ease with ambulation function  Status at evaluation/last progress note: Left Knee ROM cexqzoj=961 deg     3. Goal: Pt will increase quad and HS  strength to 4+/5 to ease with activity tolerance. Status at evaluation/last progress note: Quad and HS strength =4/5 respectively.      4. Goal: Pt will perform left SLS x10 sec to be able to return to ambulating uneven terrain. Status at evaluation/last progress note:Left SLS x 2 sec     5. Goal: Pt will perform sit to stand from various heights w/o pain to ease with transfers. Status at evaluation/last progress note: Start up knee Pain with transfers. 6 Goal:Pt will have no difficulty Performing light activities around his home. Status at evaluation/last progress note: Pt has moderate difficulty per FOTO.         PLAN  [x]  Upgrade activities as tolerated     [x]  Continue plan of care  []  Update interventions per flow sheet       []  Discharge due to:_  []  Other:_      Shaheen Pascual, PT 9/8/2022  10:21 AM    Future Appointments   Date Time Provider Dex [place]   9/8/2022  3:45 PM Godfrey Arevalo, PT [facility]   9/9/2022  2:00 PM HBV MRI RM 2 [facility]   9/13/2022  3:00 PM Ben Gifford, PTA [facility]   9/15/2022  1:30 PM Shaji Ricky, PTA [facility]   9/19/2022  2:15 PM Ben Gifford, PTA [facility]   9/21/2022  1:30 PM Ben Gifford, PTA [facility]   9/26/2022  2:15 PM Ben Gifford, PTA [facility]   9/27/2022 10:00 AM Estefani Garcia MD [facility] AMB   9/28/2022  2:15 PM Godfrey Arevalo, PT [facility]   10/3/2022  2:15 PM Ben Ирина, PTA [facility]   10/4/2022  1:45 PM Estefani Garcia MD [facility] AMB   10/5/2022  2:15 PM Ben Gifford, PTA [facility]   10/10/2022  1:30 PM Ben Gifford, PTA [facility]   10/12/2022  1:30 PM Ben Gifford, PTA [facility]   10/18/2022  1:00 PM Roverto Brush   10/18/2022  1:30 PM Godfrey Arevalo, PT [facility]   10/19/2022  9:00 AM Sumanth Montanez, PTA [facility]   10/20/2022  1:30 PM Ben Gifford, PTA [facility]   10/25/2022  1:00 PM Rosa Chinchilla MD [id number] [facility]   11/18/2022  1:10 PM Oliver Hansen PA-C [facility] AMB

## 2022-09-09 ENCOUNTER — HOSPITAL ENCOUNTER (OUTPATIENT)
Age: 67
Discharge: HOME | End: 2022-09-09
Attending: PHYSICAL MEDICINE & REHABILITATION
Payer: MEDICARE

## 2022-09-09 PROCEDURE — 72141 MRI NECK SPINE W/O DYE: CPT

## 2022-09-09 NOTE — PROGRESS NOTES
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 He states getting into and out of the car is the most painful thing to do. Today, was his first day driving since surgery. He states his back has been bothering him with tingling/burning pain down the back of his left leg. He has been getting more pain in his buttocks area since working on quad sets so he hasn't done them the last few days. OBJECTIVE  Modality rationale: decrease edema, decrease inflammation, decrease pain, increase tissue extensibility and increase muscle contraction/control to improve the patients ability to bend knee without pain.    Min Type Additional Details      [] Estim:  []Unatt       []IFC  []Premod                        []Other:  []w/ice   []w/heat  Position:  Location:    10 [x] Estim: [x]Att    []TENS instruct  [x]NMES                    [x]Other: 10:50 []w/US   []w/ice   []w/heat  Position: seated at LAQ machine 60 degree angle  Location: left quad      []  Traction: [] Cervical       []Lumbar                       [] Prone          []Supine                       []Intermittent   []Continuous Lbs:  [] before manual  [] after manual      []  Ultrasound: []Continuous   [] Pulsed                           []1MHz   []3MHz [Behavioral health counseling provided] : Behavioral health counseling provided W/cm2:  Location:      []  Iontophoresis with dexamethasone         Location: [] Take home patch   [] In clinic      []  Ice     []  heat  []  Ice massage  []  Laser   []  Anodyne Position:  Location:      []  Laser with stim  []  Other:  Position:  Location:    15 (including set up and measurements)  [x]  Vasopneumatic Device    []  Right     [x]  Left  Pre-treatment girth: 18.75\"  Post-treatment girth:18.75\"  Measured at (location): mid patella Pressure:       [] lo [x] med [] hi   Temperature: [x] lo [] med [] hi    [x] Skin assessment post-treatment:  [x]intact []redness- no adverse reaction    []redness - adverse reaction:            30 min Therapeutic Exercise:  [x] See flow sheet :   Rationale: increase ROM, increase strength, improve coordination, improve balance and increase proprioception to improve the patients ability to ambulate for longer distances. 15 min Therapeutic Activity :  [x] See flow sheet :Pt education, ambulation with mirror    Rationale: increase ROM, increase strength, improve coordination, improve balance and increase proprioception to improve the patients ability to ambulate for longer distances. 15 min Manual therapy:  [x] See flow sheet :right leg lengthening technique to correct right upslip, MET to correct left/left sacral torsion; articularis genu strumming    Trial of 1/8\" lift in right shoe to improve gait quality and reduce drop on the right with ambulation   Rationale: increase ROM, increase strength, improve coordination, improve balance and increase proprioception to improve the patients ability to ambulate for longer distances.               With   [] TE   [] TA   [] neuro   [] other: Patient Education: [x] Review HEP    [] Progressed/Changed HEP based on:   [] positioning   [] body mechanics   [] transfers   [] heat/ice application    [] other:        Other Objective/Functional Measures:    Decreased heel strike and knee flexion during gait; decreased right step [Sleep ___ hours/day] : Sleep [unfilled] hours/day [Engage in a relaxing activity] : Engage in a relaxing activity length, right step length and heel strike improved after ambulation with mirror and VC  AROM 102 left knee flexion  Reduction of pain with standing from sitting post articularis genu strumming  Centralization of left radicular symptoms with correction of alignment   Improved gait with reduction of drop on the right and increased yesenia post addition of 1/8\" lift in right shoe but will need to reassess in upcoming sessions     Reviewed form with quad set; pt had been performing glute set instead causing increased back/hip pain; corrected technique to think of lifting heel up with knee in contact with towel which caused more quivering of the left quad but unable to perform full TKE due to swelling    Pain level post treatment:2/10    ASSESSMENT/Changes in Function:   Pt is showing slow but steady progress in therapy. He was able to achieve a higher knee bend with heel slides and drive (I) 5 minutes from home to appt. He continues to struggle with getting in and out the car without pain. He demonstrates lack of TKE due to swelling causing quad inhibition and from incorrectly performing quad sets at home. Pt would benefit from continued gait training to assist with normal and safe ambulation. [x]  See Plan of Care  []  See progress note/recertification  []  See Discharge Summary         Progress towards goals / Updated goals:    Short Term Goals: To be accomplished in 1 weeks:  1. Patient will demonstrate compliance with HEP in order to improve left knee mobility for increased ease of ADLs  Met 4/12/22   Long Term Goals: To be accomplished in 4 weeks:  1. Patient will improve FOTO score by 26 points in order to demonstrate a significant improvement in function. 2. Patient will improve left knee AROM 0-100 degrees in order to increase ease of ambulation. Progressing 10-92 degrees (4/22/22) 102 degrees flexion (4/26/22)  3.  Patient will improve left knee MMT to 4/5 in order to increase ease of stair [Plan in advance] : Plan in advance negotiation. 4. Patient will perform a sit to stand transfer without UE support in order to increase ease of transfers at home. Progressing 9x with 1 UE, 5x w/o UE (4/22/22)      PLAN  [x]  Upgrade activities as tolerated     [x]  Continue plan of care  []  Update interventions per flow sheet       []  Discharge due to:_  []  Other:_      TadeoalexandreaMATTHIAS Espinosa 4/26/2022  5:19 PM    I was present during the entire treatment, directing and participating in the treatment.    CECELIA Saldana   Future Appointments   Date Time Provider Dex Hatfield   4/28/2022  2:15 PM Fadi Louise [facility]   5/3/2022  1:30 PM Ivis Blackwell PTA [facility]   5/5/2022  1:30 PM Ivis Blackwell PTA [facility]   5/20/2022  1:10 PM Maria M Duong PA-C [facility] AMB   7/5/2022  1:45 PM Antwon Fraser MD [facility] AMB   10/18/2022  1:00 PM [facility]   10/25/2022  1:00 PM Mara Chinchilla MD [id number]

## 2022-09-13 ENCOUNTER — HOSPITAL ENCOUNTER (OUTPATIENT)
Dept: PHYSICAL THERAPY | Age: 67
Discharge: HOME | End: 2022-09-13
Attending: PHYSICIAN ASSISTANT
Payer: MEDICARE

## 2022-09-13 PROCEDURE — 97112 NEUROMUSCULAR REEDUCATION: CPT

## 2022-09-13 PROCEDURE — 97110 THERAPEUTIC EXERCISES: CPT

## 2022-09-13 NOTE — PROGRESS NOTES
PT DAILY TREATMENT NOTE     Patient Name: Luke Lance  OYJF:  : 1955  [x]  Patient  Verified  Payor: [place] MEDICARE / Plan: VA MEDICARE PART A & B / Product Type: Medicare /    In time: 3:00 Out time: 4:12  Total Treatment Time (min):72  Visit #: 3 of 12    Medicare/BCBS Only   Total Timed Codes (min):  62 1:1 Treatment Time:  57       Treatment Area: Left knee pain [M25.562]    SUBJECTIVE  Pain Level (0-10 scale): 210  Any medication changes, allergies to medications, adverse drug reactions, diagnosis change, or new procedure performed?: [x] No    [] Yes (see summary sheet for update)  Subjective functional status/changes:   [] No changes reported  Pt reports ongoing inner knee pain when first standing up after prolonged sitting. He states the MD told him it could take up to a year after a revision to have less pain and feel stronger and that he is doing well just needs more strength. Pt wants to review a few exercises he is doing at home.        OBJECTIVE    Modality rationale: decrease inflammation and decrease pain to improve the patients ability to perform ADLs with increased ease    Min Type Additional Details    [] Estim:  []Unatt       []IFC  []Premod                        []Other:  []w/ice   []w/heat  Position:  Location:    [] Estim: []Att    []TENS instruct  []NMES                    []Other:  []w/US   []w/ice   []w/heat  Position:  Location:    []  Traction: [] Cervical       []Lumbar                       [] Prone          []Supine                       []Intermittent   []Continuous Lbs:  [] before manual  [] after manual    []  Ultrasound: []Continuous   [] Pulsed                           []1MHz   []3MHz W/cm2:  Location:    []  Iontophoresis with dexamethasone         Location: [] Take home patch   [] In clinic   10 [x]  Ice     []  heat  []  Ice massage  []  Laser   []  Anodyne Position: semi reclined with LEs elevated  Location: left knee    []  Laser with stim  [] Other:  Position:  Location:    []  Vasopneumatic Device    []  Right     []  Left  Pre-treatment girth:  Post-treatment girth:  Measured at (location):  Pressure:       [] lo [] med [] hi   Temperature: [] lo [] med [] hi   [x] Skin assessment post-treatment:  [x]intact []redness- no adverse reaction    []redness - adverse reaction:     47 min Therapeutic Exercise:  [x] See flow sheet :   Rationale: increase ROM, increase strength, improve coordination, and increase proprioception to improve the patients ability to perform ADLs with increased ease    15 min Neuromuscular Re-education:  [x] See flow sheet : quad sets with SLR; SLS; reduction of locking knee in extension for correct muscle firing   Rationale: increase ROM, increase strength, improve coordination, and increase proprioception to improve the patients ability to perform ADLs with increased ease            With   [] TE   [] TA   [] neuro   [] other: Patient Education: [x] Review HEP    [] Progressed/Changed HEP based on:   [] positioning   [] body mechanics   [] transfers   [] heat/ice application    [] other: table top/floor bike for home use     Other Objective/Functional Measures:   Locks left knee in extension during ambulation  Increased UE use when attempting soft knee with standing exercises  Inner knee pain during SLS but improved with arch lift and tightening tennis shoe  Reviewed home exercises corrected quad set with SLR and height of lift  Worked on reducing use of QL with standing hip abduction  Reviewed table side lying hip abduction/adduction  Educated to try ball between knees for sit to stands for more pes activation    Pain Level (0-10 scale) post treatment: 1-2/10    ASSESSMENT/Changes in Function: Pt continues with utilizing locking of the left knee in weightbearing and during gait causing TKE weakness and instability. He has decreased tibial IR/ER strength for stabilization and has pain with start up walking after prolonged sitting. He needs to work on Sigala-Emery strength in [employer] bearing to progress for strength with hiking in the fall. Patient will continue to benefit from skilled PT services to modify and progress therapeutic interventions, address functional mobility deficits, address ROM deficits, address strength deficits, analyze and address soft tissue restrictions, analyze and cue movement patterns, analyze and modify body mechanics/ergonomics, assess and modify postural abnormalities, address imbalance/dizziness, and instruct in home and community integration to attain remaining goals. Progress towards goals / Updated goals:  Short Term Goals: To be accomplished in 1 weeks:              1. Pt will be compliant with HEP to continue to improve knee function. Status at evaluation/last progress note: Verbally recommended to roll left IT band and add SL Hip Abd, Ecc Leg ext. Progressing-performing other activities at home (see subjective 9/8/22)    Long Term Goals: To be accomplished in 4 weeks:              Goal:Pt will increase FOTO score by 20  pts to improve function. Status at evaluation/last progress note:   2. Goal: Pt will increase Left Knee ROM flexion >110 deg to ease with ambulation function  Status at evaluation/last progress note: Left Knee ROM [id number]=531 deg   3. Goal: Pt will increase quad and HS  strength to 4+/5 to ease with activity tolerance. Status at evaluation/last progress note: Quad and HS strength =4/5 respectively. 4.   Goal: Pt will perform left SLS x10 sec to be able to return to ambulating uneven terrain. Status at evaluation/last progress note:Left SLS x 2 sec   5. Goal: Pt will perform sit to stand from various heights w/o pain to ease with transfers. Status at evaluation/last progress note: Start up knee Pain with transfers. 6 Goal:Pt will have no difficulty Performing light activities around his home. Status at evaluation/last progress note: Pt has moderate difficulty per FOTO. PLAN  [x]  Upgrade activities as tolerated     [x]  Continue plan of care  []  Update interventions per flow sheet       []  Discharge due to:_  []  Other:_      Sofía Tejeda, BIRGIT 9/13/2022  10:21 AM    Future Appointments   Date Time Provider Dex Alysia   9/13/2022  3:00 PM Shahnaz Salguero, PTA [facility]   9/15/2022  1:30 PM Sia Reddy, PTA [facility]   9/19/2022  2:15 PM Shahnaz Salguero, PTA [facility]   9/21/2022  1:30 PM Shahnaz Salguero, PTA [facility]   9/26/2022  2:15 PM Shahnaz Salguero, PTA [facility]   9/27/2022 10:00 AM Yovani Cook MD [facility] AMB   9/28/2022  2:15 PM Godfrey Harrington, PT [facility]   10/3/2022  2:15 PM Shahnaz Salguero, PTA [facility]   10/4/2022  1:45 PM Yovani Cook MD [facility] AMB   10/5/2022  2:15 PM Shahnaz Salguero, PTA [facility]   10/10/2022  1:30 PM Shahnaz Salguero, PTA [facility]   10/12/2022  1:30 PM Shahnaz Salguero, PTA [facility]   10/18/2022  1:30 PM Godfrey Harrington, PT [facility]   10/19/2022  9:00 AM Evelio Chen, PTA [facility]   10/20/2022  1:30 PM Shahnaz Salguero, PTA [facility]   11/18/2022  1:10 PM Eder Quezada PA-C [facility] AMB   12/27/2022  1:40 PM [facility] NURSE [address] [place] [address]   1/4/2023  1:15 PM Yocasta Chinchilla MD [address] [place] [address]

## 2022-09-15 ENCOUNTER — HOSPITAL ENCOUNTER (OUTPATIENT)
Dept: PHYSICAL THERAPY | Age: 67
Discharge: HOME | End: 2022-09-15
Attending: PHYSICIAN ASSISTANT
Payer: MEDICARE

## 2022-09-15 PROCEDURE — 97110 THERAPEUTIC EXERCISES: CPT

## 2022-09-15 PROCEDURE — 97112 NEUROMUSCULAR REEDUCATION: CPT

## 2022-09-15 NOTE — PROGRESS NOTES
PT DAILY TREATMENT NOTE     Patient Name: David Adame  WASI:[id number]  : 1955  [x]  Patient  Verified  Payor: VA MEDICARE / Plan: VA MEDICARE PART A & B / Product Type: Medicare /    In time:1:31P  Out time:2:27P  Total Treatment Time (min): 64  Visit #: 4 of 12    Medicare/BCBS Only   Total Timed Codes (min):  46 1:1 Treatment Time:  42       Treatment Area: Left knee pain [M25.562]    SUBJECTIVE  Pain Level (0-10 scale): 2-3/10  Any medication changes, allergies to medications, adverse drug reactions, diagnosis change, or new procedure performed?: [x] No    [] Yes (see summary sheet for update)  Subjective functional status/changes:   [] No changes reported  Patient reports his knee is bothering him a little more than when he came in last session. He started walking at night. He feels it when he sits down and stands. He walked 2/3 nights of the last week. He has been working on his form with exercises at home. He has some difficulty with getting up from the floor. He only has a post to hold on to go up and down stairs.     OBJECTIVE    Modality rationale: decrease inflammation and decrease pain to improve the patients ability to perform daily tasks with increased ease   Min Type Additional Details    [] Estim:  []Unatt       []IFC  []Premod                        []Other:  []w/ice   []w/heat  Position:  Location:    [] Estim: []Att    []TENS instruct  []NMES                    []Other:  []w/US   []w/ice   []w/heat  Position:  Location:    []  Traction: [] Cervical       []Lumbar                       [] Prone          []Supine                       []Intermittent   []Continuous Lbs:  [] before manual  [] after manual    []  Ultrasound: []Continuous   [] Pulsed                           []1MHz   []3MHz W/cm2:  Location:    []  Iontophoresis with dexamethasone         Location: [] Take home patch   [] In clinic   10 [x]  Ice     []  heat  []  Ice massage  []  Laser   []  Anodyne Position: semi reclined with LEs elevated  Location: left knee    []  Laser with stim  []  Other:  Position:  Location:    []  Vasopneumatic Device    []  Right     []  Left  Pre-treatment girth:  Post-treatment girth:  Measured at (location):  Pressure:       [] lo [] med [] hi   Temperature: [] lo [] med [] hi   [x] Skin assessment post-treatment:  [x]intact []redness- no adverse reaction    []redness - adverse reaction:         19 min Therapeutic Exercise:  [] See flow sheet :   Rationale: increase ROM, increase strength, improve coordination, improve balance, and increase proprioception to improve the patients ability to perform daily tasks with increased ease    27 min Neuromuscular Re-education:  []  See flow sheet : SLS, TKE, activities to prevent left knee hyperextension   Rationale: increase ROM, increase strength, improve coordination, improve balance, and increase proprioception  to improve the patients ability to perform daily tasks with increased ease              With   [] TE   [] TA   [] neuro   [] other: Patient Education: [x] Review HEP    [] Progressed/Changed HEP based on:   [] positioning   [] body mechanics   [] transfers   [] heat/ice application    [] other:      Other Objective/Functional Measures:     TKE on right while encouraging \"soft knee\" on left with theraband  Setup same for activity on left but cues to straighten knee, not lock it  Mini lunges in preparation for floor transfer   Cues to keep feet even with sit to stands (patient with right foot posterior)  Challenged with step ups to 2\" step (even more with progression to 1 UE); cues for upright posture and increasing stance time   Visible muscle quivering and increased challenge with SLR    Pain Level (0-10 scale) post treatment: 2/10    ASSESSMENT/Changes in Function: Patient continues to demonstrate impaired left quad control with increased use of genu recurvatum for stability.  He reports improving ambulation tolerance but increased pain this visit with increase in ambulation distance/time as possible contributing factor. He continues to report greatest pain with standing after sitting leading to use of SPC with ambulation initially. Patient reports continued compliance with HEP with adherence to therapist recommendations to modifications with form. Patient will continue to benefit from skilled PT services to modify and progress therapeutic interventions, address functional mobility deficits, address ROM deficits, address strength deficits, analyze and address soft tissue restrictions, analyze and cue movement patterns, analyze and modify body mechanics/ergonomics, assess and modify postural abnormalities, address imbalance/dizziness, and instruct in home and community integration to attain remaining goals. []  See Plan of Care  []  See progress note/recertification  []  See Discharge Summary         Progress towards goals / Updated goals:  Short Term Goals: To be accomplished in 1 weeks:              1. Pt will be compliant with HEP to continue to improve knee function. Status at evaluation/last progress note: Verbally recommended to roll left IT band and add SL Hip Abd, Ecc Leg ext. Progressing-performing other activities at home (see subjective 9/8/22)     Long Term Goals: To be accomplished in 4 weeks:              Goal:Pt will increase FOTO score by 20  pts to improve function. Status at evaluation/last progress note:   2. Goal: Pt will increase Left Knee ROM flexion >110 deg to ease with ambulation function  Status at evaluation/last progress note: Left Knee ROM [id number]=999 deg   3. Goal: Pt will increase quad and HS  strength to 4+/5 to ease with activity tolerance. Status at evaluation/last progress note: Quad and HS strength =4/5 respectively. 4.   Goal: Pt will perform left SLS x10 sec to be able to return to ambulating uneven terrain. Status at evaluation/last progress note:Left SLS x 2 sec   5.    Goal: Pt will perform sit to stand from various heights w/o pain to ease with transfers. Status at evaluation/last progress note: Start up knee Pain with transfers. 6 Goal:Pt will have no difficulty Performing light activities around his home. Status at evaluation/last progress note: Pt has moderate difficulty per FOTO.      PLAN  [x]  Upgrade activities as tolerated     [x]  Continue plan of care  []  Update interventions per flow sheet       []  Discharge due to:_  []  Other:_      Ana Rosa Hughes, PT 9/15/2022  12:48 PM    Future Appointments   Date Time Provider Dex [place]   9/15/2022  1:30 PM Godfrey Edgardoe Dottie, PT [facility]   9/19/2022  2:15 PM Flaco Llanes, PTA [facility]   9/21/2022  1:30 PM Flaco Llanes, PTA [facility]   9/26/2022  2:15 PM Flaco Llanes, PTA [facility]   9/27/2022 10:00 AM Yodit Pierce MD [facility] BS AMB   9/28/2022  2:15 PM Godfrey Calderón Dottie, PT [facility]   10/3/2022  2:15 PM Flaco Llanes, PTA [facility]   10/4/2022  1:45 PM Yodit Pierce MD [facility] AMB   10/5/2022  2:15 PM Flaco Llanes, P[facility]   10/10/2022  1:30 PM Flaco Llanes, PTA [facility]   10/12/2022  1:30 PM Flaco Llanes, PTA [facility]   10/18/2022  1:30 PM Godfrey Calderón Dottie, PT [facility]   10/19/2022  9:00 AM Sondra Cabral, PTA [facility]   10/20/2022  1:30 PM Flaco Llanes PTA [facility]   11/18/2022  1:10 PM Khalif Hancock PA-C [facility] AMB   12/27/2022  1:40 PM [facility] NURSE [facility]   1/4/2023  1:15 PM Given, MD Cliff Maravilla

## 2022-09-19 ENCOUNTER — HOSPITAL ENCOUNTER (OUTPATIENT)
Dept: PHYSICAL THERAPY | Age: 67
Discharge: HOME | End: 2022-09-19
Attending: PHYSICIAN ASSISTANT
Payer: MEDICARE

## 2022-09-19 PROCEDURE — 97110 THERAPEUTIC EXERCISES: CPT

## 2022-09-19 PROCEDURE — 97016 VASOPNEUMATIC DEVICE THERAPY: CPT

## 2022-09-19 NOTE — PROGRESS NOTES
PT DAILY TREATMENT NOTE     Patient Name: Lizzy Rodriguez  BWZS:[id number]  : 1955  [x]  Patient  Verified  Payor: VA MEDICARE / Plan: VA MEDICARE PART A & B / Product Type: Medicare /    In time: 2:20  Out time: 3:21  Total Treatment Time (min): 61  Visit #: 5 of 12    Medicare/BCBS Only   Total Timed Codes (min):  46 1:1 Treatment Time:  46       Treatment Area: Left knee pain [M25.562]    SUBJECTIVE  Pain Level (0-10 scale): 3/10  Any medication changes, allergies to medications, adverse drug reactions, diagnosis change, or new procedure performed?: [x] No    [] Yes (see summary sheet for update)  Subjective functional status/changes:   [] No changes reported  Pt reports increased pain on Saturday (5/10 pain) but it is improving today. Pt states ongoing swelling but has been icing at home. Pt reports the pain was sharp and along the inside of his knee. The only thing he can think of is wearing his flat shoes in the yard for yard work.      OBJECTIVE    Modality rationale: decrease inflammation and decrease pain to improve the patients ability to perform daily tasks with increased ease   Min Type Additional Details    [] Estim:  []Unatt       []IFC  []Premod                        []Other:  []w/ice   []w/heat  Position:  Location:    [] Estim: []Att    []TENS instruct  []NMES                    []Other:  []w/US   []w/ice   []w/heat  Position:  Location:    []  Traction: [] Cervical       []Lumbar                       [] Prone          []Supine                       []Intermittent   []Continuous Lbs:  [] before manual  [] after manual    []  Ultrasound: []Continuous   [] Pulsed                           []1MHz   []3MHz W/cm2:  Location:    []  Iontophoresis with dexamethasone         Location: [] Take home patch   [] In clinic    []  Ice     []  heat  []  Ice massage  []  Laser   []  Anodyne Position:  Location:     []  Laser with stim  []  Other:  Position:  Location:   15 [x]  Vasopneumatic Device []  Right     [x]  Left  Pre-treatment girth: 46 cm  Post-treatment girth: 45 cm  Measured at (location):  mid patella Pressure:       [] lo [x] med [] hi   Temperature: [x] lo [] med [] hi   [x] Skin assessment post-treatment:  [x]intact []redness- no adverse reaction    []redness - adverse reaction:         46 min Therapeutic Exercise:  [x] See flow sheet :   Rationale: increase ROM, increase strength, improve coordination, improve balance, and increase proprioception to improve the patients ability to perform daily tasks with increased ease            With   [] TE   [] TA   [] neuro   [] other: Patient Education: [x] Review HEP    [] Progressed/Changed HEP based on:   [] positioning   [] body mechanics   [] transfers   [] heat/ice application    [] other:      Other Objective/Functional Measures:   Shaking with SAQ and QS with SLR  Swelling present: left 46 cm right 43 cm  Educated on performing increased sets to build endurance  Instructed to look into getting a stationary bike versus prolonged ambulation given utilizing locking knee in extension    Pain Level (0-10 scale) post treatment: 1-2/10    ASSESSMENT/Changes in Function: Pt continues with elevated swelling and pain in the left knee since Saturday. He continues to demonstrate decreased TKE stability utilizing locking during ambulation. He has decreased hip strength required for stability. Will continue to work on two.42.solutions strength for carryover to ambulation and eventual hiking with decreased fall risk and decreased pain.     Patient will continue to benefit from skilled PT services to modify and progress therapeutic interventions, address functional mobility deficits, address ROM deficits, address strength deficits, analyze and address soft tissue restrictions, analyze and cue movement patterns, analyze and modify body mechanics/ergonomics, assess and modify postural abnormalities, address imbalance/dizziness, and instruct in home and community integration to attain remaining goals. [x]  See Plan of Care  []  See progress note/recertification  []  See Discharge Summary         Progress towards goals / Updated goals:  Short Term Goals: To be accomplished in 1 weeks:  1. Pt will be compliant with HEP to continue to improve knee function. Status at evaluation/last progress note: Verbally recommended to roll left IT band and add SL Hip Abd, Ecc Leg ext. Progressing-performing other activities at home (see subjective 9/8/22)     Long Term Goals: To be accomplished in 4 weeks:  Goal:Pt will increase FOTO score by 20  pts to improve function. Status at evaluation/last progress note:   2. Goal: Pt will increase Left Knee ROM flexion >110 deg to ease with ambulation function  Status at evaluation/last progress note: Left Knee ROM [id number]=330 deg   3. Goal: Pt will increase quad and HS  strength to 4+/5 to ease with activity tolerance. Status at evaluation/last progress note: Quad and HS strength =4/5 respectively. 4.   Goal: Pt will perform left SLS x10 sec to be able to return to ambulating uneven terrain. Status at evaluation/last progress note:Left SLS x 2 sec   5. Goal: Pt will perform sit to stand from various heights w/o pain to ease with transfers. Status at evaluation/last progress note: Start up knee Pain with transfers. 6 Goal:Pt will have no difficulty Performing light activities around his home. Status at evaluation/last progress note: Pt has moderate difficulty per FOTO.      PLAN  [x]  Upgrade activities as tolerated     [x]  Continue plan of care  []  Update interventions per flow sheet       []  Discharge due to:_  []  Other:_      Ed Man PTA 9/19/2022  12:48 PM    Future Appointments   Date Time Provider Dex [place]   9/19/2022  2:15 PM Goodwin Lias, PTA [facility]   9/21/2022  1:30 PM Goodwin Lias, PTA [facility]   9/26/2022  2:15 PM Goodwin Lias, PTA [facility]   9/27/2022 10:00 AM Ami Saini Karissa Redmond MD [facility] BS AMB   9/28/2022  2:15 PM Godfrey Ladariusell Gens, PT [facility]   10/3/2022  2:15 PM Ame Blank, PTA [facility]   10/4/2022  1:45 PM Elif Lim MD [facility] BS AMB   10/5/2022  2:15 PM Ame Blank, PTA [facility]   10/10/2022  1:30 PM Ame Blank, PTA [facility]   10/12/2022  1:30 PM Ame Blank, PTA [facility]   10/18/2022  1:30 PM Godfrey Taylor Gens, PT [facility]   10/19/2022  9:00 AM Ishmael Harringtondy, PTA [facility]   10/20/2022  1:30 PM Ame Blank, PTA [facility]   11/18/2022  1:10 PM Levi Freed PA-C [facility] BS AMB   12/27/2022  1:40 PM [facility] NURSE [facility]   1/4/2023  1:15 PM Barbra Chinchilla MD [facility] [place]

## 2022-09-21 ENCOUNTER — HOSPITAL ENCOUNTER (OUTPATIENT)
Dept: PHYSICAL THERAPY | Age: 67
Discharge: HOME | End: 2022-09-21
Attending: PHYSICIAN ASSISTANT
Payer: MEDICARE

## 2022-09-21 PROCEDURE — 97110 THERAPEUTIC EXERCISES: CPT

## 2022-09-21 PROCEDURE — 97112 NEUROMUSCULAR REEDUCATION: CPT

## 2022-09-21 NOTE — PROGRESS NOTES
PT DAILY TREATMENT NOTE     Patient Name: Mary Grace Parson  Date:2022  : 1955  [x]  Patient  Verified  Payor: [place] MED[place]ARE / Plan: [place] MEDICARE PART A & B / Product Type: Medicare /    In time: 1:30 Out time: 2:36  Total Treatment Time (min):66  Visit #: 6 of 12    Medicare/BCBS Only   Total Timed Codes (min):  56 1:1 Treatment Time:  56       Treatment Area: Left knee pain [M25.562]    SUBJECTIVE  Pain Level (0-10 scale): 2-3/10  Any medication changes, allergies to medications, adverse drug reactions, diagnosis change, or new procedure performed?: [x] No    [] Yes (see summary sheet for update)  Subjective functional status/changes:   [] No changes reported  Pt reports remembering 4 of the 5 exercises to work on. He is still having trouble going up/down his stairs step over step since he doesn't have handrails. He notes feeling unstable and karli at times in the left knee.        OBJECTIVE    Modality rationale: decrease inflammation and decrease pain to improve the patients ability to perform daily tasks with increased ease   Min Type Additional Details    [] Estim:  []Unatt       []IFC  []Premod                        []Other:  []w/ice   []w/heat  Position:  Location:    [] Estim: []Att    []TENS instruct  []NMES                    []Other:  []w/US   []w/ice   []w/heat  Position:  Location:    []  Traction: [] Cervical       []Lumbar                       [] Prone          []Supine                       []Intermittent   []Continuous Lbs:  [] before manual  [] after manual    []  Ultrasound: []Continuous   [] Pulsed                           []1MHz   []3MHz W/cm2:  Location:    []  Iontophoresis with dexamethasone         Location: [] Take home patch   [] In clinic   10 [x]  Ice     []  heat  []  Ice massage  []  Laser   []  Anodyne Position: supine left knee  Location:     []  Laser with stim  []  Other:  Position:  Location:    []  Vasopneumatic Device    []  Right     []  Left  Pre-treatment girth:   Post-treatment girth:   Measured at (location): Pressure:       [] lo [x] med [] hi   Temperature: [x] lo [] med [] hi   [x] Skin assessment post-treatment:  [x]intact []redness- no adverse reaction    []redness - adverse reaction:         40 min Therapeutic Exercise:  [x] See flow sheet :   Rationale: increase ROM, increase strength, improve coordination, improve balance, and increase proprioception to improve the patients ability to perform daily tasks with increased ease    16 min Neuromuscular Re-education:  [x] See flow sheet : TKE stability   Rationale: increase ROM, increase strength, improve coordination, improve balance, and increase proprioception to improve the patients ability to perform daily tasks with increased ease            With   [] TE   [] TA   [] neuro   [] other: Patient Education: [x] Review HEP    [] Progressed/Changed HEP based on:   [] positioning   [] body mechanics   [] transfers   [] heat/ice application    [] other:      Other Objective/Functional Measures:  Unable to hold left knee \"soft\" without UE assistance  Challenged with 4 inch slow step up/downs with using vertical bars in the back  Reviewed using rope versus TB for prone quad stretch  Reviewed soft knee for right LE advancement during gait to reduce locking in extension    Pain Level (0-10 scale) post treatment: 2/10    ASSESSMENT/Changes in Function: Pt continues to lack left TKE strength for carryover to gait without AD. He has poor strength for stair negotiation to perform reciprocally without HRs. Will continue to progress stability and proprioception for better carryover and quad use to reduce buckling.      Patient will continue to benefit from skilled PT services to modify and progress therapeutic interventions, address functional mobility deficits, address ROM deficits, address strength deficits, analyze and address soft tissue restrictions, analyze and cue movement patterns, analyze and modify body mechanics/ergonomics, assess and modify postural abnormalities, address imbalance/dizziness, and instruct in home and community integration to attain remaining goals. [x]  See Plan of Care  []  See progress note/recertification  []  See Discharge Summary         Progress towards goals / Updated goals:  Short Term Goals: To be accomplished in 1 weeks:  1. Pt will be compliant with HEP to continue to improve knee function. Status at evaluation/last progress note: Verbally recommended to roll left IT band and add SL Hip Abd, Ecc Leg ext. Progressing-performing other activities at home (see subjective 9/8/22)     Long Term Goals: To be accomplished in 4 weeks:  Goal:Pt will increase FOTO score by 20  pts to improve function. Status at evaluation/last progress note:   2. Goal: Pt will increase Left Knee ROM flexion >110 deg to ease with ambulation function  Status at evaluation/last progress note: Left Knee ROM saizdmy=854 deg   3. Goal: Pt will increase quad and HS  strength to 4+/5 to ease with activity tolerance. Status at evaluation/last progress note: Quad and HS strength =4/5 respectively. 4.   Goal: Pt will perform left SLS x10 sec to be able to return to ambulating uneven terrain. Status at evaluation/last progress note:Left SLS x 2 sec   5. Goal: Pt will perform sit to stand from various heights w/o pain to ease with transfers. Status at evaluation/last progress note: Start up knee Pain with transfers. 6 Goal:Pt will have no difficulty Performing light activities around his home. Status at evaluation/last progress note: Pt has moderate difficulty per FOTO.      PLAN  [x]  Upgrade activities as tolerated     [x]  Continue plan of care  []  Update interventions per flow sheet       []  Discharge due to:_  []  Other:_      Gerardo Anthony PTA 9/21/2022  12:48 PM    Future Appointments   Date Time Provider Dex [place]   9/21/2022  1:30 PM Anton Malloy PTA [facility]   9/26/2022 2:15 PM Brynda Muscat, PTA [facility]   9/27/2022 10:00 AM Megan Keyes MD [facility]   9/28/2022  2:15 PM Godfrey Morales, PT [facility]   10/3/2022  2:15 PM Brynda Muscat, PTA [facility]   10/4/2022  1:45 PM Megan Keyes MD [facility]   10/5/2022  2:15 PM Brynda Muscat, PTA [facility]   10/10/2022  1:30 PM Brynda Muscat, PTA [facility]   10/12/2022  1:30 PM Brynda Muscat, PTA [facility]   10/18/2022  1:30 PM Godfrey Morales, PT [facility]   10/19/2022  9:00 AM La Morales, PTA [facility]   10/20/2022  1:30 PM Brynda Muscat, PTA [facility]   11/18/2022  1:10 PM Pedro Young PA-C [facility]   12/27/2022  1:40 PM [facility] NURSE [facility]   1/4/2023  1:15 PM Emile Chinchilla MD [place]

## 2022-09-26 ENCOUNTER — HOSPITAL ENCOUNTER (OUTPATIENT)
Dept: PHYSICAL THERAPY | Age: 67
Discharge: HOME | End: 2022-09-26
Attending: PHYSICIAN ASSISTANT
Payer: MEDICARE

## 2022-09-26 PROCEDURE — 97110 THERAPEUTIC EXERCISES: CPT

## 2022-09-26 PROCEDURE — 97112 NEUROMUSCULAR REEDUCATION: CPT

## 2022-09-26 NOTE — PROGRESS NOTES
PT DAILY TREATMENT NOTE     Patient Name: Kathleen Graves  Date:2022  : 1955  [x]  Patient  Verified  Payor: [place] MEDICARE / Plan: [place] MEDICARE PART A & B / Product Type: Medicare /    In time: 2:23 Out time: 3:19  Total Treatment Time (min):56  Visit #: 7 of 12    Medicare/BCBS Only   Total Timed Codes (min):  46 1:1 Treatment Time:  46       Treatment Area: Left knee pain [M25.562]    SUBJECTIVE  Pain Level (0-10 scale): 3/10  Any medication changes, allergies to medications, adverse drug reactions, diagnosis change, or new procedure performed?: [x] No    [] Yes (see summary sheet for update)  Subjective functional status/changes:   [] No changes reported  Pt reports he struggles knowing when his knee is locked versus unlocked. He states trying the slow step ups on his back porch and could do it one day and then struggled the next. He notes too much HS pulling trying the leg lifts on his stomach so stopped performing them.     OBJECTIVE    Modality rationale: decrease inflammation and decrease pain to improve the patients ability to perform daily tasks with increased ease   Min Type Additional Details    [] Estim:  []Unatt       []IFC  []Premod                        []Other:  []w/ice   []w/heat  Position:  Location:    [] Estim: []Att    []TENS instruct  []NMES                    []Other:  []w/US   []w/ice   []w/heat  Position:  Location:    []  Traction: [] Cervical       []Lumbar                       [] Prone          []Supine                       []Intermittent   []Continuous Lbs:  [] before manual  [] after manual    []  Ultrasound: []Continuous   [] Pulsed                           []1MHz   []3MHz W/cm2:  Location:    []  Iontophoresis with dexamethasone         Location: [] Take home patch   [] In clinic   10 [x]  Ice     []  heat  []  Ice massage  []  Laser   []  Anodyne Position: supine left knee  Location:     []  Laser with stim  []  Other:  Position:  Location:    []  Vasopneumatic Device    []  Right     []  Left  Pre-treatment girth:   Post-treatment girth:   Measured at (location): Pressure:       [] lo [x] med [] hi   Temperature: [x] lo [] med [] hi   [x] Skin assessment post-treatment:  [x]intact []redness- no adverse reaction    []redness - adverse reaction:         16 min Therapeutic Exercise:  [x] See flow sheet :   Rationale: increase ROM, increase strength, improve coordination, improve balance, and increase proprioception to improve the patients ability to perform daily tasks with increased ease    30 min Neuromuscular Re-education:  [x] See flow sheet : soft knee exercises to reduce locking in extension; heavy focus on proprioception of soft versus locked knee   Rationale: increase ROM, increase strength, improve coordination, improve balance, and increase proprioception to improve the patients ability to perform daily tasks with increased ease            With   [] TE   [] TA   [] neuro   [] other: Patient Education: [x] Review HEP    [] Progressed/Changed HEP based on:   [] positioning   [] body mechanics   [] transfers   [] heat/ice application    [] other:      Other Objective/Functional Measures:  Modified to donkey kicks instead of prone hip extension  Reviewed hip abduction/adduction for form  Reviewed proprioception of locked knee (pt feels posterior knee tension and some medial knee pain ) versus unlocked, \"soft\" knee in which pt feels no pain just quad activation  Unable to perform slow 4\" step up/down without increased UE use so switched to 2 inch  Added 3 way step taps with soft knee to improve TKE stability  Challenged with forward backward stepping to improve stance phase of gait and cued to carryover into gait to work on unlocked knee at midstance    Pain Level (0-10 scale) post treatment: 2/10    ASSESSMENT/Changes in Function: Pt making slow, steady progress working on heavy focus of quad strengthening for TKE stability and stairs.  He habitually locks knee in extension at midstance causing increased knee pain with prolonged standing and walking. Will continue to progress closed chain strength of the left quad to improve gait and ease of stair negotiation with decreased buckling or pain. Patient will continue to benefit from skilled PT services to modify and progress therapeutic interventions, address functional mobility deficits, address ROM deficits, address strength deficits, analyze and address soft tissue restrictions, analyze and cue movement patterns, analyze and modify body mechanics/ergonomics, assess and modify postural abnormalities, address imbalance/dizziness, and instruct in home and community integration to attain remaining goals. [x]  See Plan of Care  []  See progress note/recertification  []  See Discharge Summary         Progress towards goals / Updated goals:  Short Term Goals: To be accomplished in 1 weeks:  1. Pt will be compliant with HEP to continue to improve knee function. Status at evaluation/last progress note: Verbally recommended to roll left IT band and add SL Hip Abd, Ecc Leg ext. Progressing-performing other activities at home (see subjective 9/8/22)     Long Term Goals: To be accomplished in 4 weeks:  Goal:Pt will increase FOTO score by 20  pts to improve function. Status at evaluation/last progress note:   2. Goal: Pt will increase Left Knee ROM flexion >110 deg to ease with ambulation function  Status at evaluation/last progress note: Left Knee ROM lwmhlho=709 deg   3. Goal: Pt will increase quad and HS  strength to 4+/5 to ease with activity tolerance. Status at evaluation/last progress note: Quad and HS strength =4/5 respectively. 4.   Goal: Pt will perform left SLS x10 sec to be able to return to ambulating uneven terrain. Status at evaluation/last progress note:Left SLS x 2 sec   Continues to lock knee in extension to attempt performing   5.    Goal: Pt will perform sit to stand from various heights w/o pain to ease with transfers. Status at evaluation/last progress note: Start up knee Pain with transfers. 6 Goal:Pt will have no difficulty Performing light activities around his home. Status at evaluation/last progress note: Pt has moderate difficulty per FOTO.      PLAN  [x]  Upgrade activities as tolerated     [x]  Continue plan of care  []  Update interventions per flow sheet       []  Discharge due to:_  []  Other:_      Rabia Sauceda, PTA 9/26/2022  12:48 PM    Future Appointments   Date Time Provider Dex Alys[place]   9/26/2022  2:15 PM Loran Clines, PTA [facility]   9/27/2022 10:00 AM Pauline Ramon MD [facility]   9/28/2022  2:15 PM Godfrey Magdalenolene Prim, PT [facility]   10/3/2022  2:15 PM Loran Clines, PTA [facility]   10/4/2022  1:45 PM Pauline Ramon MD [facility]   10/5/2022  2:15 PM Loran Clines, PTA [facility]   10/10/2022  1:30 PM Loran Clines, PTA [facility]   10/12/2022  1:30 PM Loran Clines, PTA [facility]   10/18/2022  1:30 PM Godfrey Scharlene Prim, PT [facility]   10/19/2022  9:00 AM Crystal Cook, PTA [facility]   10/20/2022  1:30 PM Loran Clines, PTA [facility]   11/18/2022  1:10 PM Maty Kuhn PA-C [facility]   [facility]  [facility]   1/4/2023  1:15 PM Lester Chinchilla MD [facility]

## 2022-09-26 NOTE — PROGRESS NOTES
[facility]  [address] [place], [facility] [address]  Phone: [phone]  Fax: [phone]    Pt's YOB: 1955    ASSESSMENT   Diagnoses and all orders for this visit:    1. Cervical spinal stenosis    2. Cervical facet joint syndrome  -     methylPREDNISolone (MEDROL DOSEPACK) 4 mg tablet; Per dose pack instructions    3. Muscle spasm    4. Warfarin anticoagulation    5. Myofascial pain       IMPRESSION AND PLAN:  Casey Aguayo is a 69 y.o. male with history of cervical and lumbar pain and presents to the office today for MRI follow up. Pt continues to complain of cervical pain, pain across the upper back radiating into the base of the skull, and lumbar pain. He is taking Skelaxin 800 mg 2 caps QHS as directed, Neurontin 100 mg 2 caps QHS as directed, and uses moist heat with relief. 1) Pt was given information on cervical spinal stenosis and facet injections. 2) Cervical spine MRI from 09/09/2022 was reviewed with the patient at this visit. 3) He will contact Dr. Александр Renee for second opinion on surgical intervention. 4) Mr. Marga Gupta has a reminder for a \"due or due soon\" health maintenance. I have asked that he contact his primary care provider, Renetta Harmon NP, for follow-up on this health maintenance. 5)  demonstrated consistency with prescribing. 6) Pt is not a candidate for NSAIDs due to use of coumadin. 7) A Medrol Dosepak was prescribed for acute inflammatory pain. Follow-up and Dispositions    Return in about 15 weeks (around 1/10/2023) for Medication follow up. HISTORY OF PRESENT ILLNESS:  Casey Aguayo is a 69 y.o. male with history of cervical and lumbar pain and presents to the office today for MRI follow up. Pt continues to complain of progressive cervical pain, pain across the upper back radiating into the base of the skull, and lumbar pain.  He reports worsening pain in the upper back and cervical region with more consistent headaches. He also notes relief in lumbar region with previously prescribed Medrol Dosepak but no relief in cervical region. Pt is taking Skelaxin 800 mg 2 caps QHS as directed, Neurontin 100 mg 2 caps QHS as directed, and uses moist heat with relief. He mentions interest in injections rather than surgery but is interested in following up with Dr. Roseann Millan for surgery opinion. Pt at this time desires to proceed with current care and will contact Dr. Freda Murdock office for opinion on proceeding with surgical intervention. Of note, pt presents to today's office visit ambulating with the assistance of a single point cane. Pain Scale: 3/10    PCP: Sully Salazar NP     Past Medical History:   Diagnosis Date    Arthritis     Chronic pain     knee and shoulder    DVT (deep venous thrombosis) ([facility] 1992    post sx.   R LEG    DVT (deep venous thrombosis) ([facility]) 2000    POST BACK SX. 2- LEFT LEG    GERD (gastroesophageal reflux disease)     Headache(784.0)     everyday    High cholesterol     Hypertension     Prostate cancer ([facility] 2018    Pure hypercholesterolemia     Spinal stenosis     Thromboembolus ([facility]         Social History     Socioeconomic History    Marital status:      Spouse name: Not on file    Number of children: Not on file    Years of education: Not on file    Highest education level: Not on file   Occupational History    Not on file   Tobacco Use    Smoking status: Never    Smokeless tobacco: Never   Vaping Use    Vaping Use: Never used   Substance and Sexual Activity    Alcohol use: No    Drug use: Never    Sexual activity: Not Currently   Other Topics Concern     Service Not Asked    Blood Transfusions Not Asked    Caffeine Concern Not Asked    Occupational Exposure Not Asked    Hobby Hazards Not Asked    Sleep Concern Not Asked    Stress Concern Not Asked    Weight Concern Not Asked    Special Diet Not Asked    Back Care Not Asked Exercise Not Asked    Bike Helmet Not Asked    Seat Belt Not Asked    Self-Exams Not Asked   Social History Narrative    Not on file     Social Determinants of Health     Financial Resource Strain: Not on file   Food Insecurity: Not on file   Transportation Needs: Not on file   Physical Activity: Not on file   Stress: Not on file   Social Connections: Not on file   Intimate Partner Violence: Not on file   Housing Stability: Not on file       Current Outpatient Medications   Medication Sig Dispense Refill    methylPREDNISolone (MEDROL DOSEPACK) 4 mg tablet Per dose pack instructions 1 Dose Pack 0    ondansetron hcl (Zofran) 4 mg tablet Take 1 Tablet by mouth every eight (8) hours as needed for Nausea or Vomiting. 20 Tablet 2    metaxalone (Skelaxin) 800 mg tablet Take 1 Tablet by mouth three (3) times daily. Take as needed  Indications: muscle spasm 90 Tablet 3    gabapentin (NEURONTIN) 100 mg capsule Take 2 capsules at bedtime as directed  Indications: neuropathic pain 180 Capsule 1    clotrimazole-betamethasone (LOTRISONE) 1-0.05 % lotion Apply  to affected area two (2) times a day. 30 mL 0    allopurinoL (ZYLOPRIM) 100 mg tablet Take 1 Tablet by mouth two (2) times a day. 60 Tablet 0    L gasseri/B bifidum/B longum ([employer] [facility] PO) Take 1 Tablet by mouth nightly. lansoprazole (PREVACID) 30 mg capsule Take 30 mg by mouth daily. warfarin (COUMADIN) 5 mg tablet TAKE 2 AND 1/2 TABLETS BY MOUTH DAILY OR AS DIRECTED (Patient taking differently: Take  by mouth daily. Patient takes 2  TABLETS BY MOUTH Daily or as directed) 75 Tab 0    lisinopril-hydroCHLOROthiazide (PRINZIDE, ZESTORETIC) 20-12.5 mg per tablet TAKE 1 TABLET BY MOUTH DAILY (Patient taking differently: Take 1 Tablet by mouth daily. TAKE 1 TABLET BY MOUTH DAILY) 90 Tab 1    labetalol (NORMODYNE) 100 mg tablet TAKE 1 TABLET BY MOUTH TWICE DAILY.  STOP VERAPAMIL (Patient taking differently: Take  by mouth two (2) times a day.) 180 Tab 0 butalbital-acetaminophen-caff (FIORICET) -40 mg per capsule 2 cap three times per day as needed for headache (Patient taking differently: Take 1 Capsule by mouth daily. 2 cap three times per day as needed for headache) 180 Cap 1    ALPRAZolam (XANAX) 0.5 mg tablet Take one half(1/2) tab to one(1) tab by mouth at bedtime as needed for sleep (Patient taking differently: Take  by mouth nightly as needed. Take one half(1/2) tab to one(1) tab by mouth at bedtime as needed for sleep) 30 Tab 0    montelukast (SINGULAIR) 10 mg tablet TAKE 1 TABLET BY MOUTH EVERY DAY 90 Tab 0    acetaminophen (TYLENOL) 500 mg tablet Take 1,000 mg by mouth every six (6) hours as needed for Pain. doxycycline (MONODOX) 100 mg capsule Take 100 mg by mouth two (2) times a day. (Patient not taking: Reported on 9/27/2022)      methylPREDNISolone (MEDROL DOSEPACK) 4 mg tablet Per dose pack instructions (Patient not taking: No sig reported) 1 Dose Pack 0       Allergies   Allergen Reactions    Amoxicillin Itching    Augmentin [Amoxicillin-Pot Clavulanate] Itching    Chlorhexidine Unknown (comments)    Chlorhexidine Towelette Itching    Hibiclens [Chlorhexidine Gluconate] Itching    Milk Containing Products Diarrhea    Nsaids (Non-Steroidal Anti-Inflammatory Drug) Other (comments)     On blood thinner, contraindicated. Penicillins Rash    Requip [Ropinirole] Nausea and Vomiting    Simvastatin Other (comments)         REVIEW OF SYSTEMS    Constitutional: Negative for fever, chills, or weight change. Respiratory: Negative for cough or shortness of breath. Cardiovascular: Negative for chest pain or palpitations. Gastrointestinal: Negative for acid reflux, change in bowel habits, or constipation. Genitourinary: Negative for dysuria and flank pain. Musculoskeletal: Positive for cervical and lumbar pain. Skin: Negative for rash. Neurological: Negative for headaches, dizziness, or numbness.   Endo/Heme/Allergies: Negative for increased bruising. Psychiatric/Behavioral: Negative for difficulty with sleep. As per HPI    PHYSICAL EXAMINATION  Visit Vitals  /83 (BP 1 Location: Left upper arm, BP Patient Position: Sitting, BP Cuff Size: Adult)   Pulse 71   Temp 96.9 °F (36.1 °C) (Temporal)   Resp 16   Ht 5' 10\" (1.778 m)   Wt 236 lb (107 kg)   SpO2 96%   BMI 33.86 kg/m²       Constitutional: Awake, alert, and in no acute distress. Neurological: 1+ symmetrical DTRs in the upper extremities. 1+ symmetrical DTRs in the lower extremities. Sensation to light touch is intact. Negative Lepe's sign bilaterally. Skin: warm, dry, and intact. Musculoskeletal: Tightness across the trapezius. Mild  pain with extension, axial loading, less with  forward flexion. No pain with internal or external rotation of his hips. Negative straight leg raise bilaterally. Pt ambulates with the assistance of a single point cane. Biceps  Triceps Deltoids Wrist Ext Wrist Flex Hand Intrin   Right +4/5 +4/5 +4/5 +4/5 +4/5 +4/5   Left +4/5 +4/5 +4/5 +4/5 +4/5 +4/5      Hip Flex  Quads Hamstrings Ankle DF EHL Ankle PF   Right +4/5 +4/5 +4/5 +4/5 +4/5 +4/5   Left +4/5 +4/5 +4/5 +4/5 +4/5 +4/5     IMAGING:    Cervical spine MRI from 09/09/2022 was personally reviewed with the patient and demonstrated:    MRI Results (most recent):  Results from Orders Only encounter on 08/24/22    MRI CERV SPINE WO CONT    Narrative  Sagittal and axial multisequence MR images of cervical spine were obtained. HISTORY: Neck stiffness. Decreased range of motion. Comparison December 19, 2016    Trace anterior spondylolisthesis of C4. No compression deformity. No pathologic  marrow signal except for mild discogenic endplate fatty changes at C5-C6 and  C6-C7. Soft tissues are unremarkable. No Chiari I malformation. No intrinsic  lesion in the spinal cord. C2-C3: No disc herniation, central or foraminal stenosis. Mild facet  hypertrophy.     C3-C4: Posterior disc bulge, contacting spinal cord, slightly more prominent  than before but no cord compression. No significant central stenosis. No  significant foraminal stenosis. C4-C5: Posterior disc bulge, slightly contacting spinal cord with no central  stenosis. Moderate left foraminal stenosis when combined with facet hypertrophy. Patent right foramen. C5-C6: Loss of discal height with mild posterior disc bulge, slightly contacting  without compressing spinal cord. Moderate bilateral foraminal stenosis. C6-C7: Posterior disc bulge and left posterior lateral disc protrusion,  contacting spinal cord. Effaced surrounding CSF. Mild to moderate central  stenosis, grossly stable. Moderately severe bilateral foraminal stenosis,  slightly worse on the left. C7-T1: No focal disc herniation or central stenosis. No cord contact. No  foraminal stenosis. Impression  Grossly stable degenerative disc disease from C3-C4 to C6-C7, with  cord contact. Central stenosis most severe at C6-C7. Foraminal stenosis also  most severe at C6-C7. Cervical 2V x-rays 08/24/2022 were personally reviewed and demonstrated :  DDD C5/6, C6/7, C7/T1 and degenerative facets     Lumbar spine MRI from 01/24/2018 was personally reviewed with the patient and demonstrated:  Results from [facility] on 01/24/18   MRI LUMB SPINE W WO CONT     Narrative MR lumbar spine with and without contrast    CPT CODE: 35379    HISTORY: Chronic and worsening low back pain with left lower extremity pain. Increasing weakness. Remote history of surgeries. No recent injury. COMPARISON: MRI January 2013. TECHNIQUE: Lumbar spine scanned with axial and sagittal T1W scans, axial and  sagittal T2W scans, and with post gadolinium axial and sagittal T1W scans. Contrast used: 10 cc Gadavist.    FINDINGS:     Prior laminotomies on the left at L4-5 and bilaterally at L5-S1. No fracture,  bone destruction, or fluid collection. Intact lordosis.  Normal vertebral body heights. Small less than 5 mm low signal  focus within anterior L4, developed since 2013. No similar focus elsewhere. No  aggressive features. Otherwise generally fatty marrow signal with some chronic  fatty endplate changes straddling L5-S1. Moderate to severe disc space narrowing  and disc desiccation of that level. Mild to moderate narrowing and desiccation  of L3-4 and L4-5. Conus at T12-L1. Axial imaging correlation:    T12-L1:  Patent canal and foramina. L1-L2: Patent canal and foramina. L2-L3: Patent canal and foramina. L3-L4: Broad-based disc osteophyte complex. Bilateral facet arthropathy with  ligamentum flavum thickening and buckling. Moderate concentric spinal stenosis. Particular narrowing of lateral recesses with transient distortion of the  crossing L4 nerves. Axial T2 image 20. Patent foramina. Progression of  degenerative findings. L4-L5: Postoperative level. Mild broad-based disc osteophyte complex with a  small amount of enhancing scar tissue. Hypertrophic facet arthropathy. Moderate  spinal stenosis. Narrowing of the lateral recesses left more than right. Transient distortion of the crossing nerves particularly left L5. Axial T2 image  13. Mild foraminal stenoses. Unchanged. L5-S1: Postoperative level. Broad-based disc osteophyte complex with enhancing  scar tissue centrally. Hypertrophic facet arthropathy. No significant spinal  stenosis. Moderately severe bilateral foraminal stenoses. Unchanged. Incidental imaging of regional soft tissues unremarkable. Impression IMPRESSION:    1. Some progression of degenerative disc and facet disease at L3-4, with  moderate spinal stenosis and potentially impingement of the crossing L4 nerves,  left more than right. 2. Stable postsurgical and degenerative findings at L4-5 and L5-S1.           Left shoulder MRI from 11/12/2021 was personally reviewed with the patient and demonstrated:     FINDINGS:     Rotator Cuff: Full-thickness tear of the supraspinatus with fluid-filled tendon defect measuring 3.7 cm in transverse dimension. Moderate infraspinatus tendinosis. Mild subscapularis tendinosis. Teres minor appears intact. Mild fatty change of the supraspinatus and infraspinatus. Subacromial Outlet: Moderate degenerative changes of the acromioclavicular joint. Moderate narrowing. Acromioclavicular joint effusion. Type 2 acromion. No os acromiale. Glenohumeral joint: No evidence of dislocation. Long Head of the Biceps Tendon: Full-thickness tear of the intra-articular portion with 8 cm of proximal tendon retraction. Moderate distension of the biceps tendon sheath. Glenoid Labrum: Maceration of the superior labrum. No paralabral cyst.      Osseous Structures: No bone contusion or Sachs-Hill deformity. Soft Tissues: Moderate joint effusion with synovitis. Moderate distension of the subacromial subdeltoid bursa. Distension of the subcoracoid bursa. IMPRESSION:      1. Full-thickness tear of the supraspinatus with fluid-filled tendon defect measuring 3.7 cm. Tendon fibers are retracted to the level of the acromioclavicular joint. Mild fatty change of the supraspinatus and infraspinatus. 2. Moderate infraspinatus and mild subscapularis tendinosis. 3. Moderate narrowing of the subacromial outlet. 4. Fill-thickness retracted tear of the intra-articular biceps tendon. Tendon fibers are retracted past the bicipital groove, at least 8 cm from the biceps anchor insertion. 5. Moderate joint effusion with synovitis. 6. Moderate distension of the subacromial subdeltoid bursa. Written by Tara Acosta, as dictated by Tommy Nicholson MD.  I, Dr. Tommy Nicholson confirm that all documentation is accurate.

## 2022-09-27 ENCOUNTER — OFFICE VISIT (OUTPATIENT)
Dept: ORTHOPEDIC SURGERY | Age: 67
End: 2022-09-27
Payer: MEDICARE

## 2022-09-27 VITALS
TEMPERATURE: 96.9 F | OXYGEN SATURATION: 96 % | HEIGHT: 70 IN | HEART RATE: 71 BPM | SYSTOLIC BLOOD PRESSURE: 133 MMHG | DIASTOLIC BLOOD PRESSURE: 83 MMHG | WEIGHT: 236 LBS | BODY MASS INDEX: 33.79 KG/M2 | RESPIRATION RATE: 16 BRPM

## 2022-09-27 DIAGNOSIS — Z79.01 WARFARIN ANTICOAGULATION: ICD-10-CM

## 2022-09-27 DIAGNOSIS — M48.02 CERVICAL SPINAL STENOSIS: Primary | ICD-10-CM

## 2022-09-27 DIAGNOSIS — M79.18 MYOFASCIAL PAIN: ICD-10-CM

## 2022-09-27 DIAGNOSIS — M47.812 CERVICAL FACET JOINT SYNDROME: ICD-10-CM

## 2022-09-27 DIAGNOSIS — M62.838 MUSCLE SPASM: ICD-10-CM

## 2022-09-27 PROCEDURE — 1124F ACP DISCUSS-NO DSCNMKR DOCD: CPT | Performed by: PHYSICAL MEDICINE & REHABILITATION

## 2022-09-27 PROCEDURE — G8536 NO DOC ELDER MAL SCRN: HCPCS | Performed by: PHYSICAL MEDICINE & REHABILITATION

## 2022-09-27 PROCEDURE — 99214 OFFICE O/P EST MOD 30 MIN: CPT | Performed by: PHYSICAL MEDICINE & REHABILITATION

## 2022-09-27 PROCEDURE — G8432 DEP SCR NOT DOC, RNG: HCPCS | Performed by: PHYSICAL MEDICINE & REHABILITATION

## 2022-09-27 PROCEDURE — 1101F PT FALLS ASSESS-DOCD LE1/YR: CPT | Performed by: PHYSICAL MEDICINE & REHABILITATION

## 2022-09-27 PROCEDURE — G8427 DOCREV CUR MEDS BY ELIG CLIN: HCPCS | Performed by: PHYSICAL MEDICINE & REHABILITATION

## 2022-09-27 PROCEDURE — G8754 DIAS BP LESS 90: HCPCS | Performed by: PHYSICAL MEDICINE & REHABILITATION

## 2022-09-27 PROCEDURE — 3017F COLORECTAL CA SCREEN DOC REV: CPT | Performed by: PHYSICAL MEDICINE & REHABILITATION

## 2022-09-27 PROCEDURE — G8752 SYS BP LESS 140: HCPCS | Performed by: PHYSICAL MEDICINE & REHABILITATION

## 2022-09-27 PROCEDURE — G8417 CALC BMI ABV UP PARAM F/U: HCPCS | Performed by: PHYSICAL MEDICINE & REHABILITATION

## 2022-09-27 NOTE — PROGRESS NOTES
Chief Complaint   Patient presents with    Follow-up     MRI       Pt preferred language for health care discussion is english. Is someone accompanying this pt? no    Is the patient using any DME equipment during OV? no    Depression Screening:  3 most recent [facility] Screens 8/26/2022 7/5/2022 4/20/2022 1/11/2022 1/5/2022 12/1/2021 9/10/2021   PHQ Not Done - - - - - - -   Little interest or pleasure in doing things Not at all Not at all Not at all Not at all Not at all Not at all Not at all   Feeling down, depressed, irritable, or hopeless Not at all Not at all Not at all Not at all Not at all Not at all Not at all   Total Score PHQ 2 0 0 0 0 0 0 0       Learning Assessment:  Learning Assessment 2/8/2019 11/6/2018 9/25/2015   PRIMARY LEARNER Patient Patient Patient   HIGHEST LEVEL OF EDUCATION - PRIMARY LEARNER  - GRADUATED HIGH SCHOOL OR GED GRADUATED [address] PRIMARY LEARNER - [address] [facility] CAREGIVER - No No   PRIMARY [address] [facility]    NEED - - No   LEARNER PREFERENCE PRIMARY DEMONSTRATION DEMONSTRATION DEMONSTRATION   ANSWERED BY Patient patient patient   RELATIONSHIP SELF SELF SELF       Abuse Screening:  Abuse Screening Questionnaire 11/6/2018 7/3/2018 2/15/2016   Do you ever feel afraid of your partner? N N N   Are you in a relationship with someone who physically or mentally threatens you? N N N   Is it safe for you to go home? Y Y Y       Fall Risk  Fall Risk Assessment, last 12 mths 8/26/2022   Able to walk? Yes   Fall in past 12 months? 0   Do you feel unsteady? -   Are you worried about falling -   Is TUG test greater than 12 seconds? -   Is the gait abnormal? -   Number of falls in past 12 months -   Fall with injury? -           Advance Directive:  1. Do you have an advance directive in place? Patient Reply:no    2. If not, would you like material regarding how to put one in place? Patient Reply: no    2.   Per patient no changes to their ACP contact no. Coordination of Care:  1. Have you been to the ER, urgent care clinic since your last visit? Hospitalized since your last visit? no    2. Have you seen or consulted any other health care providers outside of the [address] [place] [address] since your last visit? Include any pap smears or colon screening.  no

## 2022-09-28 ENCOUNTER — HOSPITAL ENCOUNTER (OUTPATIENT)
Dept: PHYSICAL THERAPY | Age: 67
Discharge: HOME | End: 2022-09-28
Attending: PHYSICIAN ASSISTANT
Payer: MEDICARE

## 2022-09-28 PROCEDURE — 97112 NEUROMUSCULAR REEDUCATION: CPT

## 2022-09-28 PROCEDURE — 97110 THERAPEUTIC EXERCISES: CPT

## 2022-09-28 RX ORDER — METHYLPREDNISOLONE 4 MG/1
TABLET ORAL
Qty: 1 DOSE PACK | Refills: 0 | Status: SHIPPED | OUTPATIENT
Start: 2022-09-28

## 2022-09-28 NOTE — PROGRESS NOTES
PT DAILY TREATMENT NOTE     Patient Name: Blas Polanco  Date:2022  : 1955  [x]  Patient  Verified  Payor: [place] MEDICARE / Plan: [place] MEDICARE PART A & B / Product Type: Medicare /    In time:2:16P  Out time:3:17P  Total Treatment Time (min): 46  Visit #: 8 of 12    Medicare/BCBS Only   Total Timed Codes (min):  41 1:1 Treatment Time:  39       Treatment Area: Left knee pain [M25.562]    SUBJECTIVE  Pain Level (0-10 scale): 2/10  Any medication changes, allergies to medications, adverse drug reactions, diagnosis change, or new procedure performed?: [x] No    [] Yes (see summary sheet for update)  Subjective functional status/changes:   [] No changes reported  Patient reports his knee is a little tender today. He did ok after last visit and left with less pain. Last Saturday was a bad day; yesterday was also harder. He uses a cane in the front yard as there is a slope. He had an appt with Dr. Willie Johnston yesterday. He has stenosis and it is worse than she thought. She wants him to hold on the injections for the neck and see a surgeon. He has appt with Dr. Maggie Gomez about a possible neck surgery. He has had headaches every day for the last 15 years. This news made him a little depressed this week. His headaches go away when he moves more like when he does yard work but his neck may hurt more after from looking down too much.      OBJECTIVE    Modality rationale: decrease edema, decrease inflammation, and decrease pain to improve the patients ability to perform daily tasks with increased ease   Min Type Additional Details    [] Estim:  []Unatt       []IFC  []Premod                        []Other:  []w/ice   []w/heat  Position:  Location:    [] Estim: []Att    []TENS instruct  []NMES                    []Other:  []w/US   []w/ice   []w/heat  Position:  Location:    []  Traction: [] Cervical       []Lumbar                       [] Prone          []Supine                       []Intermittent   []Continuous Lbs:  [] before manual  [] after manual    []  Ultrasound: []Continuous   [] Pulsed                           []1MHz   []3MHz W/cm2:  Location:    []  Iontophoresis with dexamethasone         Location: [] Take home patch   [] In clinic   10 [x]  Ice     []  heat  []  Ice massage  []  Laser   []  Anodyne Position: semi reclined with LEs elevated  Location: left knee    []  Laser with stim  []  Other:  Position:  Location:    []  Vasopneumatic Device    []  Right     []  Left  Pre-treatment girth:  Post-treatment girth:  Measured at (location):  Pressure:       [] lo [] med [] hi   Temperature: [] lo [] med [] hi   [x] Skin assessment post-treatment:  [x]intact []redness- no adverse reaction    []redness - adverse reaction:         15 min Therapeutic Exercise:  [x] See flow sheet :   Rationale: increase ROM, increase strength, improve coordination, improve balance, and increase proprioception to improve the patients ability to perform daily tasks with increased ease       26 min Neuromuscular Re-education:  [x]  See flow sheet : foam balance, TKE, standing balance    Rationale: increase ROM, increase strength, improve coordination, improve balance, and increase proprioception  to improve the patients ability to perform daily tasks with increased ease              With   [] TE   [] TA   [] neuro   [] other: Patient Education: [x] Review HEP    [] Progressed/Changed HEP based on:   [] positioning   [] body mechanics   [] transfers   [] heat/ice application    [] other:      Other Objective/Functional Measures:     Pain to inferior hamstring reported with standing with knee locked with symptoms reduced with \"soft knee\"   Educated on using hamstring pain as guide for ensuring knees are not locked in standing   No pain reported with TKE   Challenged with 2\" step ups-B UE support required initially to tap right heel to step and perform with good control; able to perform with unilateral UE support for last 2-3 reps  Educated on knee hyperextension as mechanism for stability and that slight increase in ext normal when balance extremely challenged  Foam balance as intro to challenging balance on uneven surfaces/outdoors    Pain Level (0-10 scale) post treatment: 2/10    ASSESSMENT/Changes in Function: Patient continues to report overall subjective improvement since start of care. Patient limited by min increased knee symptoms this visit contributing to more pronounced antalgia with gait. He reports improved mindfulness of preventing knee hyperextension with standing and walking but remain challenged with doing so with dynamic activities such as standing and navigating stairs. Patient will continue to benefit from skilled PT services to modify and progress therapeutic interventions, address functional mobility deficits, address ROM deficits, address strength deficits, analyze and address soft tissue restrictions, analyze and cue movement patterns, analyze and modify body mechanics/ergonomics, assess and modify postural abnormalities, address imbalance/dizziness, and instruct in home and community integration to attain remaining goals. Progress towards goals / Updated goals:  Short Term Goals: To be accomplished in 1 weeks:  1. Pt will be compliant with HEP to continue to improve knee function. Status at evaluation/last progress note: Verbally recommended to roll left IT band and add SL Hip Abd, Ecc Leg ext. Progressing-performing other activities at home (see subjective 9/8/22)      Long Term Goals: To be accomplished in 4 weeks:  Goal:Pt will increase FOTO score by 20  pts to improve function. Status at evaluation/last progress note:   2. Goal: Pt will increase Left Knee ROM flexion >110 deg to ease with ambulation function  Status at evaluation/last progress note: Left Knee ROM uyczbgz=471 deg   3. Goal: Pt will increase quad and HS  strength to 4+/5 to ease with activity tolerance.    Status at evaluation/last progress note: Quad and HS strength =4/5 respectively. 4.   Goal: Pt will perform left SLS x10 sec to be able to return to ambulating uneven terrain. Status at evaluation/last progress note:Left SLS x 2 sec   Continues to lock knee in extension to attempt performing   5. Goal: Pt will perform sit to stand from various heights w/o pain to ease with transfers. Status at evaluation/last progress note: Start up knee Pain with transfers. 6 Goal:Pt will have no difficulty Performing light activities around his home. Status at evaluation/last progress note: Pt has moderate difficulty per FOTO.         PLAN  []  Upgrade activities as tolerated     []  Continue plan of care  []  Update interventions per flow sheet       []  Discharge due to:_  []  Other:_      Atilio Baum, PT 9/28/2022  8:58 AM    Future Appointments   Date Time Provider Dex [place]   9/28/2022  2:15 PM Godfrey Hilton, PT [facility]   10/3/2022  2:15 PM Stewart Sergio, PTA [facility]   10/4/2022  1:45 PM Sandy Ross MD [facility] AM[facility]   10/5/2022  2:15 PM Stewart Sergio, PTA [facility]   10/10/2022  1:30 PM Stewart Sergio, PTA [facility]   10/12/2022  1:30 PM Stewart Sergio, PTA [facility]   10/18/2022  1:30 PM Godfrey Medeirosw, PT [facility]   10/19/2022  9:00 AM Polo Wolf, PTA [facility]   10/20/2022  1:30 PM Stewart Hill, PTA [facility]   11/18/2022  1:10 PM Luis Armando Weinberg PA-C [facility] AMB   12/27/2022  1:40 PM [facility]URSE [facility]   1/4/2023  1:15 PM Given, Saleem Gay MD [address]   1/17/2023  1:30 PM Sandy Ross MD [facility] BS AMB

## 2022-10-03 ENCOUNTER — APPOINTMENT (OUTPATIENT)
Dept: PHYSICAL THERAPY | Age: 67
End: 2022-10-03
Attending: PHYSICIAN ASSISTANT
Payer: MEDICARE

## 2022-10-04 ENCOUNTER — OFFICE VISIT (OUTPATIENT)
Dept: ORTHOPEDIC SURGERY | Age: 67
End: 2022-10-04

## 2022-10-04 VITALS
SYSTOLIC BLOOD PRESSURE: 136 MMHG | OXYGEN SATURATION: 97 % | DIASTOLIC BLOOD PRESSURE: 79 MMHG | BODY MASS INDEX: 34.07 KG/M2 | HEIGHT: 70 IN | HEART RATE: 70 BPM | TEMPERATURE: 97.4 F | WEIGHT: 238 LBS | RESPIRATION RATE: 16 BRPM

## 2022-10-04 DIAGNOSIS — Z79.01 WARFARIN ANTICOAGULATION: ICD-10-CM

## 2022-10-04 DIAGNOSIS — M48.061 SPINAL STENOSIS OF LUMBAR REGION WITHOUT NEUROGENIC CLAUDICATION: Primary | ICD-10-CM

## 2022-10-04 DIAGNOSIS — M62.838 MUSCLE SPASM: ICD-10-CM

## 2022-10-04 DIAGNOSIS — M47.816 FACET ARTHROPATHY, LUMBAR: ICD-10-CM

## 2022-10-04 DIAGNOSIS — G62.9 NEUROPATHY: ICD-10-CM

## 2022-10-04 PROCEDURE — 99213 OFFICE O/P EST LOW 20 MIN: CPT | Performed by: PHYSICAL MEDICINE & REHABILITATION

## 2022-10-04 PROCEDURE — 1124F ACP DISCUSS-NO DSCNMKR DOCD: CPT | Performed by: PHYSICAL MEDICINE & REHABILITATION

## 2022-10-04 RX ORDER — GABAPENTIN 100 MG/1
CAPSULE ORAL
Qty: 270 CAPSULE | Refills: 1 | Status: SHIPPED | OUTPATIENT
Start: 2022-10-04

## 2022-10-04 NOTE — PATIENT INSTRUCTIONS
Low Back Arthritis: Exercises  Introduction  Here are some examples of typical rehabilitation exercises for your condition. Start each exercise slowly. Ease off the exercise if you start to have pain. Your doctor or physical therapist will tell you when you can start these exercises and which ones will work best for you. When you are not being active, find a comfortable position for rest. Some people are comfortable on the floor or a medium-firm bed with a small pillow under their head and another under their knees. Some people prefer to lie on their side with a pillow between their knees. Don't stay in one position for too long. Take short walks (10 to 20 minutes) every 2 to 3 hours. Avoid slopes, hills, and stairs until you feel better. Walk only distances you can manage without pain, especially leg pain. How to do the exercises  Pelvic tilt    Lie on your back with your knees bent. \"Brace\" your stomach--tighten your muscles by pulling in and imagining your belly button moving toward your spine. Press your lower back into the floor. You should feel your hips and pelvis rock back. Hold for 6 seconds while breathing smoothly. Relax and allow your pelvis and hips to rock forward. Repeat 8 to 12 times. Back stretches    Get down on your hands and knees on the floor. Relax your head and allow it to droop. Round your back up toward the ceiling until you feel a nice stretch in your upper, middle, and lower back. Hold this stretch for as long as it feels comfortable, or about 15 to 30 seconds. Return to the starting position with a flat back while you are on your hands and knees. Let your back sway by pressing your stomach toward the floor. Lift your buttocks toward the ceiling. Hold this position for 15 to 30 seconds. Repeat 2 to 4 times. Follow-up care is a key part of your treatment and safety. Be sure to make and go to all appointments, and call your doctor if you are having problems.  It's also a good idea to know your test results and keep a list of the medicines you take. Where can you learn more? Go to http://rivka-jarrell.info/  Enter T094 in the search box to learn more about \"Low Back Arthritis: Exercises. \"  Current as of: July 1, 2021               Content Version: 13.2  © 2006-2022 [employer], [employer]. Care instructions adapted under license by [employer] (which disclaims liability or warranty for this information). If you have questions about a medical condition or this instruction, always ask your healthcare professional. [address] any warranty or liability for your use of this information.

## 2022-10-04 NOTE — LETTER
10/5/2022    Patient: Crys Rosa   YOB: 1955   Date of Visit: 10/4/2022     Andreina Jaimes NP  [phone]  Via Fax: [phone]    Dear Andreina Jaimes NP,      Thank you for referring Mr. Crys Rosa to [facility] [employer] [facility] for evaluation. My notes for this consultation are attached. If you have questions, please do not hesitate to call me. I look forward to following your patient along with you.       Sincerely,    Emerson Ellis MD

## 2022-10-04 NOTE — PROGRESS NOTES
Chief Complaint   Patient presents with    Follow-up       Pt preferred language for health care discussion is english. Is someone accompanying this pt? no    Is the patient using any DME equipment during OV? no    Depression Screening:  3 most recent [facility] Screens 8/26/2022 7/5/2022 4/20/2022 1/11/2022 1/5/2022 12/1/2021 9/10/2021   PHQ Not Done - - - - - - -   Little interest or pleasure in doing things Not at all Not at all Not at all Not at all Not at all Not at all Not at all   Feeling down, depressed, irritable, or hopeless Not at all Not at all Not at all Not at all Not at all Not at all Not at all   Total Score PHQ 2 0 0 0 0 0 0 0       Learning Assessment:  Learning Assessment 2/8/2019 11/6/2018 9/25/2015   PRIMARY LEARNER Patient Patient Patient   HIGHEST LEVEL OF EDUCATION - PRIMARY LEARNER  - GRADUATED HIGH SCHOOL OR GED GRADUATED [address] PRIMARY LEARNER [facility] [address] CAREGIVER - No No   PRIMARY [address] [facility]    NEED - - No   LEARNER PREFERENCE PRIMARY DEMONSTRATION DEMONSTRATION DEMONSTRATION   ANSWERED BY Patient patient patient   RELATIONSHIP SELF SELF SELF       Abuse Screening:  Abuse Screening Questionnaire 11/6/2018 7/3/2018 2/15/2016   Do you ever feel afraid of your partner? N N N   Are you in a relationship with someone who physically or mentally threatens you? N N N   Is it safe for you to go home? Y Y Y       Fall Risk  Fall Risk Assessment, last 12 mths 8/26/2022   Able to walk? Yes   Fall in past 12 months? 0   Do you feel unsteady? -   Are you worried about falling -   Is TUG test greater than 12 seconds? -   Is the gait abnormal? -   Number of falls in past 12 months -   Fall with injury? -           Advance Directive:  1. Do you have an advance directive in place? Patient Reply:no    2. If not, would you like material regarding how to put one in place? Patient Reply: no    2. Per patient no changes to their ACP contact no. Coordination of Care:  1. Have you been to the ER, urgent care clinic since your last visit? Hospitalized since your last visit? no    2. Have you seen or consulted any other health care providers outside of the [address] [place] [address] since your last visit? Include any pap smears or colon screening.  no

## 2022-10-05 ENCOUNTER — HOSPITAL ENCOUNTER (OUTPATIENT)
Dept: PHYSICAL THERAPY | Age: 67
Discharge: HOME | End: 2022-10-05
Attending: PHYSICIAN ASSISTANT
Payer: MEDICARE

## 2022-10-05 PROCEDURE — 97530 THERAPEUTIC ACTIVITIES: CPT

## 2022-10-05 PROCEDURE — 97110 THERAPEUTIC EXERCISES: CPT

## 2022-10-05 NOTE — PROGRESS NOTES
PT DAILY TREATMENT NOTE     Patient Name: Pattie Bella  [id number]  : 1955  [x]  Patient  Verified  Payor: [place] MEDICARE / Plan: VA MEDICARE PART A & B / Product Type: Medicare /    In time: 2:15 Out time: 3:30  Total Treatment Time (min): 75  Visit #: 9 of 12    Medicare/BCBS Only   Total Timed Codes (min):  65 1:1 Treatment Time:  50       Treatment Area: Left knee pain [M25.562]    SUBJECTIVE  Pain Level (0-10 scale): 3/10  Any medication changes, allergies to medications, adverse drug reactions, diagnosis change, or new procedure performed?: [x] No    [] Yes (see summary sheet for update)  Subjective functional status/changes:   [] No changes reported  Pt reports medial knee pain on the left after prolonged sitting and attempting to stand. He states walking hasn't been as bad but he still locks his knee. He has been having more problems with his neck and headaches recently which the MD sent a script for PT for his neck. He is going on a trip Oct 26-31 and would like to finish his knee prior to leaving and start his neck upon his return. He has been doing his exercises at home.      OBJECTIVE    Modality rationale: decrease edema, decrease inflammation, and decrease pain to improve the patients ability to perform daily tasks with increased ease   Min Type Additional Details    [] Estim:  []Unatt       []IFC  []Premod                        []Other:  []w/ice   []w/heat  Position:  Location:    [] Estim: []Att    []TENS instruct  []NMES                    []Other:  []w/US   []w/ice   []w/heat  Position:  Location:    []  Traction: [] Cervical       []Lumbar                       [] Prone          []Supine                       []Intermittent   []Continuous Lbs:  [] before manual  [] after manual    []  Ultrasound: []Continuous   [] Pulsed                           []1MHz   []3MHz W/cm2:  Location:    []  Iontophoresis with dexamethasone         Location: [] Take home patch   [] In clinic   10 [x]  Ice     [x]  heat  []  Ice massage  []  Laser   []  Anodyne Position: semi reclined with LEs elevated  Location: left knee-ice  Heat-B UTs    []  Laser with stim  []  Other:  Position:  Location:    []  Vasopneumatic Device    []  Right     []  Left  Pre-treatment girth:  Post-treatment girth:  Measured at (location):  Pressure:       [] lo [] med [] hi   Temperature: [] lo [] med [] hi   [x] Skin assessment post-treatment:  [x]intact []redness- no adverse reaction    []redness - adverse reaction:         25 min Therapeutic Exercise:  [x] See flow sheet :   Rationale: increase ROM, increase strength, improve coordination, improve balance, and increase proprioception to improve the patients ability to perform daily tasks with increased ease    40 min Therapeutic Activity:  [x] See flow sheet : discussing therapy plan with new script for c/s; pt education on posture to start addressing neck; orthotic education; assessment of TC neutral on the left; sit to stands; goal reassessment   Rationale: increase ROM, increase strength, improve coordination, improve balance, and increase proprioception to improve the patients ability to perform daily tasks with increased ease             With   [] TE   [] TA   [] neuro   [] other: Patient Education: [x] Review HEP    [] Progressed/Changed HEP based on:   [] positioning   [] body mechanics   [] transfers   [] heat/ice application    [] other:      Other Objective/Functional Measures:   Left knee AROM flexion: 107 degrees  Left knee extension MMT: 4/5  Left knee flexion MMT: 4/5  Sit to stands: medial knee pain as arch collapses from lower heights  Right arch maintained compared to left; collapsing of left orthotic when compared to right and decreased shoe support noted as pt able to still collapse over shoe    Pain Level (0-10 scale) post treatment: 2/10    ASSESSMENT/Changes in Function: Mr. Vilma Gilmore is making slow, steady progress towards goals.  He continues to lack TKE strength due to compensatory gait for lack of stability post 1st knee replacement utilizing screw home mechanism versus the quad. He also demonstrates this compensation on stairs limiting his ease of reciprocal pattern due to weakness. His sit to stands remain painful due to left pes planus despite orthotic use as it collapses still under the foot causing increased valgus moment at the knee. Will continue 6 more sessions to finalize independence with HEP then D/C to start PT for his neck. Patient will continue to benefit from skilled PT services to modify and progress therapeutic interventions, address functional mobility deficits, address ROM deficits, address strength deficits, analyze and address soft tissue restrictions, analyze and cue movement patterns, analyze and modify body mechanics/ergonomics, assess and modify postural abnormalities, address imbalance/dizziness, and instruct in home and community integration to attain remaining goals. Progress towards goals / Updated goals:  Short Term Goals: To be accomplished in 1 weeks:  1. Pt will be compliant with HEP to continue to improve knee function. Status at evaluation/last progress note: Verbally recommended to roll left IT band and add SL Hip Abd, Ecc Leg ext. Progressing-performing other activities at home (see subjective 9/8/22)      Long Term Goals: To be accomplished in 4 weeks:  Goal:Pt will increase FOTO score by 20  pts to improve function. Status at evaluation/last progress note:   2. Goal: Pt will increase Left Knee ROM flexion >110 deg to ease with ambulation function  Status at evaluation/last progress note: Left Knee ROM dcgeurh=677 deg   107 degrees  3. Goal: Pt will increase quad and HS  strength to 4+/5 to ease with activity tolerance. Status at evaluation/last progress note: Quad and HS strength =4/5 respectively. 4/5  4.    Goal: Pt will perform left SLS x10 sec to be able to return to ambulating uneven terrain. Status at evaluation/last progress note:Left SLS x 2 sec   Continues to lock knee in extension to attempt performing   5. Goal: Pt will perform sit to stand from various heights w/o pain to ease with transfers. Status at evaluation/last progress note: Start up knee Pain with transfers. Medial knee pain with arch collapse  6 Goal:Pt will have no difficulty Performing light activities around his home. Status at evaluation/last progress note: Pt has moderate difficulty per FOTO.         PLAN  [x]  Upgrade activities as tolerated     [x]  Continue plan of care  []  Update interventions per flow sheet       []  Discharge due to:_  [x]  Other: complete remaining sessions on knee (6 visits) then D/C to start PT for c/s      Edwige Toussaint, PTA 10/5/2022  8:58 AM    Future Appointments   Date Time Provider Dex [place]   10/5/2022  2:15 PM Texie Slipper, PTA [facility]   10/6/2022  3:00 PM Boneta Plane, PT [id number]   10/10/2022  1:30 PM Texie Slipper, PTA [facility]   10/12/2022  1:30 PM Texie Slipper, PTA [facility]   10/18/2022  1:30 PM Godfrey Jordan, PT [facility]   10/19/2022  9:00 AM Diana Staples, PTA [facility]   10/20/2022  1:30 PM Texie Slipper, PTA [facility]   11/18/2022  1:10 PM Haley Jesus PA-C [facility] BS AMB   12/27/2022  1:40 PM [facility] NURSE [facility]   1/4/2023  1:15 PM Katlyn Chinchilla MD [address]   1/17/2023  1:30 PM Cali Hein MD [facility] BS AMB   2/6/2023  2:45 PM Cali Hein MD [facility] BS AMB

## 2022-10-06 ENCOUNTER — HOSPITAL ENCOUNTER (OUTPATIENT)
Dept: PHYSICAL THERAPY | Age: 67
Discharge: HOME | End: 2022-10-06
Attending: PHYSICIAN ASSISTANT
Payer: MEDICARE

## 2022-10-06 PROCEDURE — 97112 NEUROMUSCULAR REEDUCATION: CPT

## 2022-10-06 PROCEDURE — 97110 THERAPEUTIC EXERCISES: CPT

## 2022-10-06 NOTE — PROGRESS NOTES
PT DAILY TREATMENT NOTE     Patient Name: Yolanda Germain  WENX:[id number]  : 1955  [x]  Patient  Verified  Payor: [place] MEDICARE / Plan: [place] MEDICARE PART A & B / Product Type: Medicare /    In time:3:00  Out time:3:43  Total Treatment Time (min): 43  Visit #: 10 of 12    Medicare/BCBS Only   Total Timed Codes (min):  43 1:1 Treatment Time:  43       Treatment Area: Left knee pain [M25.562]    SUBJECTIVE  Pain Level (0-10 scale): 2/10  Any medication changes, allergies to medications, adverse drug reactions, diagnosis change, or new procedure performed?: [x] No    [] Yes (see summary sheet for update)  Subjective functional status/changes:   [] No changes reported  Pt reports 70% improvement since start of care. He wishes his knee would stop snapping back with walking, and he wishes he was further with his progress, but overall he's much better. He is still doing his HEP from previous round of therapy, and he is still using heel lift daily. Pt would like to finish his remaining scheduled appointments for his knee and then be evaluated for his neck.       OBJECTIVE      25 min Therapeutic Exercise:  [x] See flow sheet :   Rationale: increase ROM, increase strength, improve balance, and increase proprioception to improve the patients ability to improve ambulatory tolerance and ease of daily tasks    18 min Neuromuscular Re-education:  [x]  See flow sheet : standing balance interventions; quad re-ed with standing TKE    Rationale: increase ROM, increase strength, improve balance, and increase proprioception  to improve the patients ability to improve ease/safety with ambulation and daily tasks             With   [x] TE   [] TA   [] neuro   [] other: Patient Education: [x] Review HEP    [] Progressed/Changed HEP based on:   [] positioning   [] body mechanics   [] transfers   [] heat/ice application    [x] other: reviewed 3 systems of balance, ankle/hip/stepping strategy, practicing SLS with knee unlocked at countertop for up to 30 seconds every other day, verbal review of current HEP        Other Objective/Functional Measures:     Subjective information obtained and FOTO initiated during bike to warm up     FOTO: 59/100   SLS: 2 seconds max hold time during two 30 second trials. Typically required UE support </= 1 second with SLS, knee locked in extension  Required 1 UE support on parallel bars to achieve unlock of TKE with SLS   Pt declined heat     Pain Level (0-10 scale) post treatment: 1/10    ASSESSMENT/Changes in Function: See Recert     Patient will continue to benefit from skilled PT services to modify and progress therapeutic interventions, address functional mobility deficits, address ROM deficits, address strength deficits, analyze and address soft tissue restrictions, analyze and cue movement patterns, analyze and modify body mechanics/ergonomics, assess and modify postural abnormalities, address imbalance/dizziness, and instruct in home and community integration to attain remaining goals.      []  See Plan of Care  [x]  See progress note/recertification  []  See Discharge Summary         Progress towards goals / Updated goals:  See Recert     PLAN  [x]  Upgrade activities as tolerated     [x]  Continue plan of care  []  Update interventions per flow sheet       []  Discharge due to:_  []  Other:_      Sirena Mcmullen, PT 10/6/2022  3:02 PM    Future Appointments   Date Time Provider Dex Alysia   10/10/2022  1:30 PM Catrina Jones PTA MMCPTPB [facility]   10/12/2022  1:30 PM Catrina Jones, PTA [facility]CP[facility]   10/18/2022  1:30 PM Godfrey Jameson, PT [id number] [facility]   10/19/2022  9:00 AM Johanna Mckeon PTA IMCBXNT [facility]   10/20/2022  1:30 PM Catrina Jones PTA [facility]CP[facility]   11/18/2022  1:10 PM Brenden Barajas PA-C [facility] BS AMB   12/27/2022  1:40 PM [facility]   1/4/2023  1:15 PM Tram Chinchilla MD [facility]   1/17/2023  1:30 PM Shanna Toure MD [facility] BS AMB   2/6/2023  2:45 PM Joanna Snowden MD [facility] BS AMB

## 2022-10-06 NOTE — THERAPY RECERTIFICATION
[facility] - Dulce Carbajal  [address] [facility]  [phone] fax    Continued Plan of Care/ Re-certification for Physical Therapy Services    Patient name: Carla Simon Start of Care: 22   Referral source: Marisa Rosado : 1955   Medical/Treatment Diagnosis: Left knee pain [M25.562]  Payor: [place] MEDICARE / Plan: VA MEDICARE PART A & B / Product Type: Medicare /  Onset Date:3/2022     Prior Hospitalization: see medical history Provider#: [id number]   Medications: Verified on Patient Summary List    Comorbidities: HBP   Prior Level of Function: Likes to go [address] [employer], [address] [address] [employer]. Visits from Start of Care: 10    Missed Visits: 0    The Plan of Care and following information is based on the patient's current status:    Goal: Pt will be compliant with HEP to continue to improve knee function. Status at evaluation/last progress note: Verbally recommended to roll left IT band and add SL Hip Abd, Ecc Leg ext. Current Status: met     Goal:Pt will increase FOTO score by 20  pts to improve function. Status at evaluation/last progress note: 43/100  Current Status: not met. Progressing. Increased 16 points to 59/100    Goal: Pt will increase Left Knee ROM flexion >110 deg to ease with ambulation function  Status at evaluation/last progress note: Left Knee ROM apmyvyd=683 deg   Current Status: not met. 107 degrees    Goal: Pt will increase quad and HS  strength to 4+/5 to ease with activity tolerance. Status at evaluation/last progress note: Quad and HS strength =4/5 respectively. Current Status: not met. 4/5    Goal: Pt will perform left SLS x10 sec to be able to return to ambulating uneven terrain. Status at evaluation/last progress note:Left SLS x 2 sec   Current Status: not met. 2 seconds     Goal: Pt will perform sit to stand from various heights w/o pain to ease with transfers.    Status at evaluation/last progress note: Start up knee Pain with transfers. Current Status: not met. Medial knee pain with arch collapse    Goal:Pt will have no difficulty Performing light activities around his home. Status at evaluation/last progress note: Pt has moderate difficulty per FOTO. Current Status: met. Reports no difficulty     Key functional changes: slowly improving knee flexion AROM, improving FOTO score,       Problems/ barriers to goal attainment: length of current impairments      Problem List: pain affecting function, decrease ROM, decrease strength, edema affecting function, impaired gait/ balance, decrease ADL/ functional abilitiies, decrease activity tolerance, decrease flexibility/ joint mobility, and decrease transfer abilities    Treatment Plan: Therapeutic exercise, Therapeutic activities, Neuromuscular re-education, Physical agent/modality, Gait/balance training, Manual therapy, Patient education, Self Care training, Functional mobility training, Home safety training, and Stair training     Patient Goal (s) has been updated and includes: less genu recurvatum during gait, less pain with sit<>stand transfers      Goals for this certification period to be accomplished in 4 weeks:              Goal:Pt will increase FOTO score by 20  pts to improve function. Status at evaluation/last progress note: Increased 16 points to 59/100      2. Goal: Pt will increase Left Knee ROM flexion >110 deg to ease with ambulation function  Status at evaluation/last progress note: Left Knee ROM mtrkrxq=496 deg     3. Goal: Pt will increase quad and HS  strength to 4+/5 to ease with activity tolerance. Status at evaluation/last progress note: Quad and HS strength =4/5 respectively.      4.   Goal: Pt will report compliance with HEP and understanding of self-progression of interventions to allow for continued progression independently following DC   Status at evaluation/last progress note: Pt compliant with comprehensive HEP from previous round of therapy. Frequency / Duration: Patient to be seen 2-3 times per week for 4 weeks:    Assessment / Recommendations:Pt is making slow, steady progress in therapy, meeting 2/7 initial goals and progressing towards FOTO and knee AROM goal.  He continues to lack TKE strength d/t compensatory gait for lack of stability s/p initial TKA and utilizing screw home mechanism versus quad utilization. Pt continues to report pain with sit<>stand transition, especially following prolonged sitting, with pes planus likely contributing to valgus collapse and pain. Knee AROM is slowly improving. Pt reports 70% improvement since start of care, and significant improvement in FOTO score is indicative of functional improvement. Pt is compliant with comprehensive HEP from previous round of therapy but would benefit from continued education regarding self-progression with interventions to maximize gains following DC. SLS continues to be impaired, and pt requires UE support to unlock left knee with SLS d/t decreased quad stability and likely proprioceptive deficits. Pt will benefit from continued skilled PT for a few final sessions to address remaining strength, ROM, posture, and balance deficits and to ensure understanding of self-progression with HEP to allow for continued progression independently following DC. Certification Period: 10/7/22 to 11/5/22    Ariane Collazo, PT 10/6/2022 3:02 PM    ________________________________________________________________________  I certify that the above Therapy Services are being furnished while the patient is under my care. I agree with the treatment plan and certify that this therapy is necessary. [] I have read the above and request that my patient continue as recommended.   [] I have read the above report and request that my patient continue therapy with the following changes/special instructions: _______________________________________  [] I have read the above report and request that my patient be discharged from therapy    Physician's Signature:____________Date:_________TIME:________     Renay Brandt PA-C  ** Signature, Date and Time must be completed for valid certification **    Please sign and return to [facility]  [address] [place] [address]  [phone] fax

## 2022-10-10 ENCOUNTER — HOSPITAL ENCOUNTER (OUTPATIENT)
Dept: PHYSICAL THERAPY | Age: 67
Discharge: HOME | End: 2022-10-10
Attending: PHYSICIAN ASSISTANT
Payer: MEDICARE

## 2022-10-10 PROCEDURE — 97110 THERAPEUTIC EXERCISES: CPT

## 2022-10-10 PROCEDURE — 97112 NEUROMUSCULAR REEDUCATION: CPT

## 2022-10-10 NOTE — PROGRESS NOTES
PT DAILY TREATMENT NOTE     Patient Name: Pattie Bella  OFTE:  : 1955  [x]  Patient  Verified  Payor: VA MEDICARE / Plan: VA MEDICARE PART A & B / Product Type: Medicare /    In time: 1:34 Out time: 2:25  Total Treatment Time (min): 51  Visit #: 1 of 12    Medicare/BCBS Only   Total Timed Codes (min):  41 1:1 Treatment Time:  41       Treatment Area: Left knee pain [M25.562]    SUBJECTIVE  Pain Level (0-10 scale): 1/10  Any medication changes, allergies to medications, adverse drug reactions, diagnosis change, or new procedure performed?: [x] No    [] Yes (see summary sheet for update)  Subjective functional status/changes:   [] No changes reported  Pt reports not too much pain today and less swelling. He has most pain upon standing after prolonged sitting. He has the hardest time not locking his knee when walking. OBJECTIVE    26 min Therapeutic Exercise:  [x] See flow sheet :   Rationale: increase ROM, increase strength, improve balance, and increase proprioception to improve the patients ability to improve ambulatory tolerance and ease of daily tasks    15 min Neuromuscular Re-education:  [x]  See flow sheet :   Rationale: increase ROM, increase strength, improve balance, and increase proprioception  to improve the patients ability to improve ease/safety with ambulation and daily tasks     10 minutes of ice at end to left knee to reduce swelling for ease of ambulation            With   [] TE   [] TA   [] neuro   [] other: Patient Education: [x] Review HEP    [] Progressed/Changed HEP based on:   [] positioning   [] body mechanics   [] transfers   [] heat/ice application    [] other:      Other Objective/Functional Measures:   Towel behind head while performing TKE to work on c/s posture  Cues throughout to reduce locking knee   Cues for step up to not maintain knee but still extend but not lock  Challenged with eccentric step downs  Improved form with mirror feedback during exercises    Pain Level (0-10 scale) post treatment: 1.5/10    ASSESSMENT/Changes in Function: Pt continues to struggle with reducing locking left knee in extension during gait. He also gets increased knee pain upon standing after prolonged sitting. Will continue working on TKE stability during SLS for carryover in gait and walking with decreased fall risk. Patient will continue to benefit from skilled PT services to modify and progress therapeutic interventions, address functional mobility deficits, address ROM deficits, address strength deficits, analyze and address soft tissue restrictions, analyze and cue movement patterns, analyze and modify body mechanics/ergonomics, assess and modify postural abnormalities, address imbalance/dizziness, and instruct in home and community integration to attain remaining goals. [x]  See Plan of Care  [x]  See progress note/recertification  []  See Discharge Summary         Goals for this certification period to be accomplished in 4 weeks:  Goal:Pt will increase FOTO score by 20  pts to improve function. Status at evaluation/last progress note: Increased 16 points to 59/100    2. Goal: Pt will increase Left Knee ROM flexion >110 deg to ease with ambulation function  Status at evaluation/last progress note: Left Knee ROM wwjkndb=905 deg   3. Goal: Pt will increase quad and HS  strength to 4+/5 to ease with activity tolerance. Status at evaluation/last progress note: Quad and HS strength =4/5 respectively. 4.   Goal: Pt will report compliance with HEP and understanding of self-progression of interventions to allow for continued progression independently following DC   Status at evaluation/last progress note: Pt compliant with comprehensive HEP from previous round of therapy.      PLAN  [x]  Upgrade activities as tolerated     [x]  Continue plan of care  []  Update interventions per flow sheet       []  Discharge due to:_  []  Other:_      Elizabeth Childers PTA 10/10/2022  3:02 PM    Future Appointments   Date Time Provider Dex [place]   10/10/2022  1:30 PM Donald Lozada, PTA [facility]   10/12/2022  1:30 PM Donald Lozada, PTA [facility]   10/18/2022  1:30 PM Godfrey Painter, PT [facility]   10/19/2022  9:00 AM Rhondareji Tang, PTA [facility]   10/20/2022  1:30 PM Donald Lozada, PTA [facility]   11/3/2022  2:15 PM Levi Soliman, PT [facility]   11/18/2022  1:10 PM Ronnie Alberto PA-C [facility] BS AMB   12/27/2022  1:40 PM [facility]   1/4/2023  1:15 PM Se Chinchilla MD [address] [facility]   1/17/2023  1:30 PM Trista Lombardo MD [facility] BS AMB   2/6/2023  2:45 PM Trista Lombardo MD [facility] BS AMB

## 2022-10-12 ENCOUNTER — HOSPITAL ENCOUNTER (OUTPATIENT)
Dept: PHYSICAL THERAPY | Age: 67
Discharge: HOME | End: 2022-10-12
Attending: PHYSICIAN ASSISTANT
Payer: MEDICARE

## 2022-10-12 PROCEDURE — 97110 THERAPEUTIC EXERCISES: CPT

## 2022-10-12 NOTE — PROGRESS NOTES
PT DAILY TREATMENT NOTE     Patient Name: Cesilia Orta  VBON:  : 1955  [x]  Patient  Verified  Payor: VA MEDICARE / Plan: [place] MEDICARE PART A & B / Product Type: Medicare /    In time: 1:35 Out time: 2:35   Total Treatment Time (min): 60  Visit #: 2 of 12    Medicare/BCBS Only   Total Timed Codes (min):  50 1:1 Treatment Time: 45       Treatment Area: Left knee pain [M25.562]    SUBJECTIVE  Pain Level (0-10 scale): 1-2/10  Any medication changes, allergies to medications, adverse drug reactions, diagnosis change, or new procedure performed?: [x] No    [] Yes (see summary sheet for update)  Subjective functional status/changes:   [] No changes reported  Pt reports walking is better and stronger. He states getting up after prolonged sitting is the hardest part. OBJECTIVE    50 min Therapeutic Exercise:  [x] See flow sheet :   Rationale: increase ROM, increase strength, improve balance, and increase proprioception to improve the patients ability to improve ambulatory tolerance and ease of daily tasks      10 minutes of ice at end to left knee to reduce swelling for ease of ambulation            With   [] TE   [] TA   [] neuro   [] other: Patient Education: [x] Review HEP    [] Progressed/Changed HEP based on:   [] positioning   [] body mechanics   [] transfers   [] heat/ice application    [] other:      Other Objective/Functional Measures: Added hip ER/IR strengthening  Decreased pes anserine strength  Challenged with sit to stands with hands on left thigh to encourage weight shift  Cues to reduce TFL compensation with S/L abduction    Pain Level (0-10 scale) post treatment: 1/10    ASSESSMENT/Changes in Function: Pt with decreased hip ER/IR strength needed to assist with screw home mechanism for stability. He also continues to lack TKE strength to reduce genu recurvatum with ambulation. Will continue to progress strength and stability for ease of hiking at end of month.     Patient will continue to benefit from skilled PT services to modify and progress therapeutic interventions, address functional mobility deficits, address ROM deficits, address strength deficits, analyze and address soft tissue restrictions, analyze and cue movement patterns, analyze and modify body mechanics/ergonomics, assess and modify postural abnormalities, address imbalance/dizziness, and instruct in home and community integration to attain remaining goals. [x]  See Plan of Care  [x]  See progress note/recertification  []  See Discharge Summary         Goals for this certification period to be accomplished in 4 weeks:  Goal:Pt will increase FOTO score by 20  pts to improve function. Status at evaluation/last progress note: Increased 16 points to 59/100    2. Goal: Pt will increase Left Knee ROM flexion >110 deg to ease with ambulation function  Status at evaluation/last progress note: Left Knee ROM cpbndwo=284 deg   3. Goal: Pt will increase quad and HS  strength to 4+/5 to ease with activity tolerance. Status at evaluation/last progress note: Quad and HS strength =4/5 respectively. 4.   Goal: Pt will report compliance with HEP and understanding of self-progression of interventions to allow for continued progression independently following DC   Status at evaluation/last progress note: Pt compliant with comprehensive HEP from previous round of therapy.      PLAN  [x]  Upgrade activities as tolerated     [x]  Continue plan of care  []  Update interventions per flow sheet       []  Discharge due to:_  []  Other:_      Lolis Ramirez, BIRGIT 10/12/2022  3:02 PM    Future Appointments   Date Time Provider Dex [place]   10/12/2022  1:30 PM Stafford Kayser, PTA [facility]   10/18/2022  1:30 PM Godfrey Lewis, PT HEBLHBI [facility]   10/19/2022  9:00 AM Stafford Kayser, PTA [facility]   10/20/2022  1:30 PM Godfrey Lewis, PT [id number] [facility]   11/3/2022  2:15 PM Spring Magana, PT [id number] [facility]   11/18/2022 1:10 PM Ness Wright PA-C [facility] BS AMB   12/27/2022  1:40 PM [facility] NURSE [facility]   1/4/2023  1:15 PM Precious Chinchilla MD [address] [facility]   1/17/2023  1:30 PM Vinnie Whitley MD [facility] BS AMB   2/6/2023  2:45 PM Vinnie Whitley MD [facility] BS AMB

## 2022-10-18 ENCOUNTER — HOSPITAL ENCOUNTER (OUTPATIENT)
Dept: PHYSICAL THERAPY | Age: 67
Discharge: HOME | End: 2022-10-18
Attending: PHYSICIAN ASSISTANT
Payer: MEDICARE

## 2022-10-18 PROCEDURE — 97110 THERAPEUTIC EXERCISES: CPT

## 2022-10-18 NOTE — PROGRESS NOTES
PT DAILY TREATMENT NOTE     Patient Name: Rosalia Soriano  VFOT:  : 1955  [x]  Patient  Verified  Payor: [place] MEDICARE / Plan: [place] MEDICARE PART A & B / Product Type: Medicare /    In time:1:35P  Out time:2:30P  Total Treatment Time (min): 55  Visit #: 3 of 12    Medicare/BCBS Only   Total Timed Codes (min):  45 1:1 Treatment Time:  45       Treatment Area: Left knee pain [M25.562]    SUBJECTIVE  Pain Level (0-10 scale): 2-3/10  Any medication changes, allergies to medications, adverse drug reactions, diagnosis change, or new procedure performed?: [x] No    [] Yes (see summary sheet for update)  Subjective functional status/changes:   [] No changes reported  Patient reports he notices improvement with ambulating on an incline in the front of his house. He was waiting for the rain to clear and was able to walk back up to the house without his cane in the dark. He is going on a trip and then will have an eval for his neck at the beginning of Nov. He wants to work on getting up from a chair and out of bed; he has to take 3-4 steps then pain will ease up. It is worse the longer he sits. He has been working on sitting down slower.       OBJECTIVE    Modality rationale: decrease edema, decrease inflammation, and decrease pain to improve the patients ability to perform daily tasks with increased ease   Min Type Additional Details    [] Estim:  []Unatt       []IFC  []Premod                        []Other:  []w/ice   []w/heat  Position:  Location:    [] Estim: []Att    []TENS instruct  []NMES                    []Other:  []w/US   []w/ice   []w/heat  Position:  Location:    []  Traction: [] Cervical       []Lumbar                       [] Prone          []Supine                       []Intermittent   []Continuous Lbs:  [] before manual  [] after manual    []  Ultrasound: []Continuous   [] Pulsed                           []1MHz   []3MHz W/cm2:  Location:    []  Iontophoresis with dexamethasone Location: [] Take home patch   [] In clinic   10 [x]  Ice     []  heat  []  Ice massage  []  Laser   []  Anodyne Position: supine with LEs elevated  Location: left knee    []  Laser with stim  []  Other:  Position:  Location:    []  Vasopneumatic Device    []  Right     []  Left  Pre-treatment girth:  Post-treatment girth:  Measured at (location):  Pressure:       [] lo [] med [] hi   Temperature: [] lo [] med [] hi   [x] Skin assessment post-treatment:  [x]intact []redness- no adverse reaction    []redness - adverse reaction:         45 min Therapeutic Exercise:  [x] See flow sheet :   Rationale: increase ROM, increase strength, and increase proprioception to improve the patients ability to perform daily tasks with increased ease           With   [] TE   [] TA   [] neuro   [] other: Patient Education: [x] Review HEP    [] Progressed/Changed HEP based on:   [] positioning   [] body mechanics   [] transfers   [] heat/ice application    [] other:      Other Objective/Functional Measures:     Reviewed adding marches to regimen of exercises prior to standing to improve ROM/reduce stiffness prior to standing  Unable to perform sit to stands with left foot back with table at lowest height   Cramping/discomfort reported to adductors with seated hip ER/IR with add ball squeeze  Trialed butterfly stretch with reduced inner thigh discomfort reported-advised to trial at home if continued discomfort noted to area  Resisted hip ER performed B; add ball squeeze held for second rep of resisted ER on left due to reports of worsening and radiating from medial knee to groin; reports of only discomfort to medial knee reported following       Pain Level (0-10 scale) post treatment: 2/10    ASSESSMENT/Changes in Function: Patient continues to report min overall pain to left knee at max. He reports understanding of and compliance with HEP and therapist recommendations for modifications for form/comfort.  He remains challenged with hip ER/IR strengthening activities. Tightness to adductors reduced with stretching. Plan to discharge from skilled outpatient PT in 2 visits. Patient will continue to benefit from skilled PT services to modify and progress therapeutic interventions, address functional mobility deficits, address ROM deficits, address strength deficits, analyze and address soft tissue restrictions, analyze and cue movement patterns, analyze and modify body mechanics/ergonomics, assess and modify postural abnormalities, address imbalance/dizziness, and instruct in home and community integration to attain remaining goals. Progress towards goals / Updated goals:  Goal:Pt will increase FOTO score by 20  pts to improve function. Status at evaluation/last progress note: Increased 16 points to 59/100    2. Goal: Pt will increase Left Knee ROM flexion >110 deg to ease with ambulation function  Status at evaluation/last progress note: Left Knee ROM fcrywta=276 deg   3. Goal: Pt will increase quad and HS  strength to 4+/5 to ease with activity tolerance. Status at evaluation/last progress note: Quad and HS strength =4/5 respectively. 4.   Goal: Pt will report compliance with HEP and understanding of self-progression of interventions to allow for continued progression independently following DC   Status at evaluation/last progress note: Pt compliant with comprehensive HEP from previous round of therapy.      PLAN  [x]  Upgrade activities as tolerated     [x]  Continue plan of care  []  Update interventions per flow sheet       []  Discharge due to:_  []  Other:_      Annette Marmolejo, PT 10/18/2022  10:20 AM    Future Appointments   Date Time Provider Dex Hatfield   10/18/2022  1:30 PM Godfrey Purdyper, PT MMCPTPB [facility]   10/19/2022  9:00 AM Ramsey Sanchez, PTA MMCPTPB [facility]   10/20/2022  1:30 PM Godfrey Tate, PT KGIRULM [facility]   11/3/2022  2:15 PM Mónica Corral, PT HVEIHVQ [facility]   11/18/2022  1:10 PM Sharol Lombard, Hilario Li PA-C VS BS AMB   12/27/2022  1:40 PM [facility] NURSE [facility]   1/4/2023  1:15 PM Given, Marval Holter, MD [id number]   1/17/2023  1:30 PM Addison Lentz MD VSMO BS AMB   2/6/2023  2:45 PM Addison Lentz MD VSMO BS AMB

## 2022-10-19 ENCOUNTER — HOSPITAL ENCOUNTER (OUTPATIENT)
Dept: PHYSICAL THERAPY | Age: 67
Discharge: HOME | End: 2022-10-19
Attending: PHYSICIAN ASSISTANT
Payer: MEDICARE

## 2022-10-19 PROCEDURE — 97110 THERAPEUTIC EXERCISES: CPT

## 2022-10-19 PROCEDURE — 97140 MANUAL THERAPY 1/> REGIONS: CPT

## 2022-10-19 NOTE — PROGRESS NOTES
PT DAILY TREATMENT NOTE     Patient Name: Nellie Harrison  ZKJR:  : 1955  [x]  Patient  Verified  Payor: VA MEDICARE / Plan: VA MEDICARE PART A & B / Product Type: Medicare /    In time: 9:00  Out time: 10:00  Total Treatment Time (min): 60  Visit #: 4 of 12    Medicare/BCBS Only   Total Timed Codes (min):  50 1:1 Treatment Time:  25       Treatment Area: Left knee pain [M25.562]    SUBJECTIVE  Pain Level (0-10 scale): 3-4/10  Any medication changes, allergies to medications, adverse drug reactions, diagnosis change, or new procedure performed?: [x] No    [] Yes (see summary sheet for update)  Subjective functional status/changes:   [] No changes reported  Pt reports doing a lot of yard work yesterday and thinking he flared up his inner left knee and outer right lower leg. He states overall great improvement with easier time going up the ramp at home and feeling stronger in general than he has been. He would like a check list of the major exercises to focus on for D/C tomorrow.        OBJECTIVE    Modality rationale: decrease edema, decrease inflammation, and decrease pain to improve the patients ability to perform daily tasks with increased ease   Min Type Additional Details    [] Estim:  []Unatt       []IFC  []Premod                        []Other:  []w/ice   []w/heat  Position:  Location:    [] Estim: []Att    []TENS instruct  []NMES                    []Other:  []w/US   []w/ice   []w/heat  Position:  Location:    []  Traction: [] Cervical       []Lumbar                       [] Prone          []Supine                       []Intermittent   []Continuous Lbs:  [] before manual  [] after manual    []  Ultrasound: []Continuous   [] Pulsed                           []1MHz   []3MHz W/cm2:  Location:    []  Iontophoresis with dexamethasone         Location: [] Take home patch   [] In clinic   10 [x]  Ice     [x]  heat  []  Ice massage  []  Laser   []  Anodyne Position: supine with LEs elevated  Location: left knee; heat to medial thigh    []  Laser with stim  []  Other:  Position:  Location:    []  Vasopneumatic Device    []  Right     []  Left  Pre-treatment girth:  Post-treatment girth:  Measured at (location):  Pressure:       [] lo [] med [] hi   Temperature: [] lo [] med [] hi   [x] Skin assessment post-treatment:  [x]intact []redness- no adverse reaction    []redness - adverse reaction:         35 min Therapeutic Exercise:  [x] See flow sheet :   Rationale: increase ROM, increase strength, and increase proprioception to improve the patients ability to perform daily tasks with increased ease     15 min Manual Therapy:  [x] See flow sheet : leg lengthening for right upslip; MET for right AI; shotgun technique  TPR to adductor group   Rationale: increase ROM, increase strength, and increase proprioception to improve the patients ability to perform daily tasks with increased ease           With   [] TE   [] TA   [] neuro   [] other: Patient Education: [x] Review HEP    [] Progressed/Changed HEP based on:   [] positioning   [] body mechanics   [] transfers   [] heat/ice application    [] other:      Other Objective/Functional Measures:   Multiple TPs along adductors; educated on using tennis ball at home to work on  Will updated final HEP check list for tomorrow  Decreased pain post manual    Pain Level (0-10 scale) post treatment: 2/10    ASSESSMENT/Changes in Function: Pt progressing towards final goals in therapy with generally a decrease in left knee pain. He mainly has pes anserine pain likely a combination of weakness in the adductor group and continuing to lock knee in extension during gait. He lacks quad strength for stairs and sit to stand transfers from longevity of time spent compensating for weakness.  Will plan for D/C tomorrow with final home program.     Patient will continue to benefit from skilled PT services to modify and progress therapeutic interventions, address functional mobility deficits, address ROM deficits, address strength deficits, analyze and address soft tissue restrictions, analyze and cue movement patterns, analyze and modify body mechanics/ergonomics, assess and modify postural abnormalities, address imbalance/dizziness, and instruct in home and community integration to attain remaining goals. Progress towards goals / Updated goals:  Goal:Pt will increase FOTO score by 20  pts to improve function. Status at evaluation/last progress note: Increased 16 points to 59/100    2. Goal: Pt will increase Left Knee ROM flexion >110 deg to ease with ambulation function  Status at evaluation/last progress note: Left Knee ROM dfoetdb=406 deg   3. Goal: Pt will increase quad and HS  strength to 4+/5 to ease with activity tolerance. Status at evaluation/last progress note: Quad and HS strength =4/5 respectively. 4.   Goal: Pt will report compliance with HEP and understanding of self-progression of interventions to allow for continued progression independently following DC   Status at evaluation/last progress note: Pt compliant with comprehensive HEP from previous round of therapy.      PLAN  [x]  Upgrade activities as tolerated     [x]  Continue plan of care  []  Update interventions per flow sheet       []  Discharge due to:_  [x]  Other:D/C tomorrow      Ami Current, PTA 10/19/2022  10:20 AM    Future Appointments   Date Time Provider Dex Hatfield   10/19/2022  9:00 AM Nenita Sims, PTA [id number] [facility]   10/20/2022  1:30 PM Hudgins Leopold Hunger, PT [id number] [facility]   11/3/2022  2:15 PM Phi Seo, PT IRSHRHV [facility]   11/18/2022  1:10 PM Sterling Canas PA-C VSHV BS AMB   12/27/2022  1:40 PM [facility] NURSE [facility] ROXANNE SCHED   1/4/2023  1:15 PM Gustabo Chinchilla MD [address]   1/17/2023  1:30 PM Rell Cheng MD VSMO BS AMB   2/6/2023  2:45 PM Rell Cheng MD VSMO BS AMB

## 2022-10-20 ENCOUNTER — HOSPITAL ENCOUNTER (OUTPATIENT)
Dept: PHYSICAL THERAPY | Age: 67
Discharge: HOME | End: 2022-10-20
Attending: PHYSICIAN ASSISTANT
Payer: MEDICARE

## 2022-10-20 PROCEDURE — 97530 THERAPEUTIC ACTIVITIES: CPT

## 2022-10-20 PROCEDURE — 97110 THERAPEUTIC EXERCISES: CPT

## 2022-10-20 NOTE — THERAPY DISCHARGE
[facility] [place] [employer]  [facility]  [address] [facility]  [phone] fax    Physical Therapy Discharge Summary    Patient name: Ileana Benedict Start of Care: 22   Referral source: Perry Hernandez : 1955   Medical/Treatment Diagnosis: Left knee pain [M25.562]  Payor: VA MEDICARE / Plan: VA MEDICARE PART A & B / Product Type: Medicare /  Onset Date:3/2022     Prior Hospitalization: see medical history Provider#: [id number]   Medications: Verified on Patient Summary List    Comorbidities: HBP  Prior Level of Function:Likes to go [place]. Visits from Start of Care: 15    Missed Visits: 0    Reporting Period : 10/6/22 to 10/20/22    Summary of Care:  Goal:   Goal:Pt will increase FOTO score by 20  pts to improve function. Status at last note/certification: 80/819  Status at discharge: not met-61/100    Goal: Pt will increase Left Knee ROM flexion >110 deg to ease with ambulation function  Status at last note/certification:107 deg  Status at discharge: not met-102 deg    Goal: Pt will increase quad and HS  strength to 4+/5 to ease with activity tolerance. Status at last note/certification: Quad and HS strength =4/5  Status at discharge: not met-Left knee MMT: flex 4+/5 ext 4-/5     Goal:  Pt will report compliance with HEP and understanding of self-progression of interventions to allow for continued progression independently following DC   Status at last note/certification:Pt compliant with comprehensive HEP from previous round of therapy. Status at discharge: met      ASSESSMENT/RECOMMENDATIONS: Patient has progressed well overall with therapy. Patient more limited this visit secondary to recent onset of left adductor pain likely due to increased use and increased overall activity level.  Patient demonstrates improved overall left knee AROM and strength as well as improved overall stability with ambulation reporting ability to ambulate uphill without AD. He continues to report min pain localized to medial knee worsened with sitting. He mainly has pes anserine pain likely a combination of weakness in the adductor group and continuing to lock knee in extension during gait. He lacks quad strength for stairs and sit to stand transfers from longevity of time spent compensating for weakness. Patient reports understanding standing of and compliance with progressive HEP.  At this time, patient is appropriate for discharge from skilled outpatient PT.     [x]Discontinue therapy: [x]Patient has reached or is progressing toward set goals      []Patient is non-compliant or has abdicated      []Due to lack of appreciable progress towards set goals    Gricel Liu, PT 10/20/2022 2:44 PM

## 2022-10-20 NOTE — PROGRESS NOTES
Physical Therapy Discharge Instructions      [facility] [place] [employer] [facility]  [address] [place] [address]  [phone] fax    Patient: Gato Leahy  : 1955      Continue Home Exercise Program as prescribed     Continue with    [x] Ice  as needed      [x] Heat           Follow up with MD:     [] Upon completion of therapy     [x] As needed      Recommendations:     [x]   Return to activity with home program    []   Return to activity with the following modifications:       []Post Rehab Program    []Join Independent aquatic program     []Return to/join local gym        Additional Comments: Emphasize mechanics with walking and stretching while on vacation. Continue varying exercises throughout the week and perform less advanced activities if feeling more sore/tired.            Frank Whitley, PT 10/20/2022 2:55 PM

## 2022-10-20 NOTE — PROGRESS NOTES
PT DAILY TREATMENT NOTE     Patient Name: Cecilia Yan  HLQK:  : 1955  [x]  Patient  Verified  Payor: VA MEDICARE / Plan: VA MEDICARE PART A & B / Product Type: Medicare /    In time:11:15A  Out time:12:09P  Total Treatment Time (min): 47  Visit #: 5 of 12    Medicare/BCBS Only   Total Timed Codes (min):  44 1:1 Treatment Time:  44       Treatment Area: Left knee pain [M25.562]    SUBJECTIVE  Pain Level (0-10 scale): 2/10  Any medication changes, allergies to medications, adverse drug reactions, diagnosis change, or new procedure performed?: [x] No    [] Yes (see summary sheet for update)  Subjective functional status/changes:   [] No changes reported  Patient reports he is a little more sore today. He had some pain to his inner thighs but feels the manual helped yesterday. He feels he is more steady than he was a couple weeks ago. He feels he overdid it during yard work. He will be going on a trip for a few days and will do some hiking; it will be his first time on a larger plane.     OBJECTIVE    Modality rationale: decrease edema, decrease inflammation, and decrease pain to improve the patients ability to perform ADLs with increased ease   Min Type Additional Details    [] Estim:  []Unatt       []IFC  []Premod                        []Other:  []w/ice   []w/heat  Position:  Location:    [] Estim: []Att    []TENS instruct  []NMES                    []Other:  []w/US   []w/ice   []w/heat  Position:  Location:    []  Traction: [] Cervical       []Lumbar                       [] Prone          []Supine                       []Intermittent   []Continuous Lbs:  [] before manual  [] after manual    []  Ultrasound: []Continuous   [] Pulsed                           []1MHz   []3MHz W/cm2:  Location:    []  Iontophoresis with dexamethasone         Location: [] Take home patch   [] In clinic   10 [x]  Ice     []  heat  []  Ice massage  []  Laser   []  Anodyne Position: semi reclined with LEs elevated  Location: left knee    []  Laser with stim  []  Other:  Position:  Location:    []  Vasopneumatic Device    []  Right     []  Left  Pre-treatment girth:  Post-treatment girth:  Measured at (location):  Pressure:       [] lo [] med [] hi   Temperature: [] lo [] med [] hi   [x] Skin assessment post-treatment:  [x]intact []redness- no adverse reaction    []redness - adverse reaction:         30 min Therapeutic Exercise:  [] See flow sheet : HEP review   Rationale: increase ROM, increase strength, improve coordination, improve balance, and increase proprioception to improve the patients ability to perform ADLs and ambulate with increased ease    14 min Therapeutic Activity:  []  See flow sheet : FOTO, goal assessment, patient education   Rationale: increase ROM, increase strength, improve coordination, improve balance, and increase proprioception  to improve the patients ability to perform ADLs and ambulate with increased ease and determine therapy plan          With   [] TE   [] TA   [] neuro   [] other: Patient Education: [x] Review HEP    [] Progressed/Changed HEP based on:   [] positioning   [] body mechanics   [] transfers   [] heat/ice application    [] other: emphasizing form with walking and stretching while on vacation     Other Objective/Functional Measures:     Left knee MMT: flex 4+/5 ext 4-/5   Left knee flex AROM: 102 deg  FOTO: 61/100    Reviewed progressive HEP    Pain Level (0-10 scale) post treatment: 2/10    ASSESSMENT/Changes in Function:  See Discharge Summary    []  See Plan of Care  []  See progress note/recertification  [x]  See Discharge Summary         Progress towards goals / Updated goals:  Goal:Pt will increase FOTO score by 20  pts to improve function. Status at evaluation/last progress note: Increased 16 points to 59/100    2.    Goal: Pt will increase Left Knee ROM flexion >110 deg to ease with ambulation function  Status at evaluation/last progress note: Left Knee ROM arlajst=884 deg   3. Goal: Pt will increase quad and HS  strength to 4+/5 to ease with activity tolerance. Status at evaluation/last progress note: Quad and HS strength =4/5 respectively. 4.   Goal: Pt will report compliance with HEP and understanding of self-progression of interventions to allow for continued progression independently following DC   Status at evaluation/last progress note: Pt compliant with comprehensive HEP from previous round of therapy.      PLAN  []  Upgrade activities as tolerated     []  Continue plan of care  []  Update interventions per flow sheet       [x]  Discharge due to: patient progressing towards goals  []  Other:_      Laura Free, PT 10/20/2022  11:07 AM    Future Appointments   Date Time Provider Dex Hatfield   10/20/2022 11:15 AM Godfrey Gilmore, PT [id number] [facility]   11/3/2022  2:15 PM Estrada Chandra, PT [id number] [facility]   11/18/2022  1:10 PM Christina Dotson PA-C [facility] BS AMB   12/27/2022  1:40 PM [facility] NURSE [facility] ROXANNE SCHED   1/4/2023  1:15 PM Amor, Annemarie Irizarry MD [address]   1/17/2023  1:30 PM Yehuda Gautam MD [facility] BS AMB   2/6/2023  2:45 PM Yehuda Gautam MD VSMO BS AMB

## 2022-11-03 ENCOUNTER — HOSPITAL ENCOUNTER (OUTPATIENT)
Dept: PHYSICAL THERAPY | Age: 67
Discharge: HOME | End: 2022-11-03
Payer: MEDICARE

## 2022-11-03 PROCEDURE — 97530 THERAPEUTIC ACTIVITIES: CPT

## 2022-11-03 PROCEDURE — 97162 PT EVAL MOD COMPLEX 30 MIN: CPT
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
## 2022-11-08 ENCOUNTER — APPOINTMENT (OUTPATIENT)
Dept: PHYSICAL THERAPY | Age: 67
End: 2022-11-08
Payer: MEDICARE

## 2022-11-09 ENCOUNTER — HOSPITAL ENCOUNTER (OUTPATIENT)
Dept: PHYSICAL THERAPY | Age: 67
Discharge: HOME | End: 2022-11-09
Payer: MEDICARE

## 2022-11-09 PROCEDURE — 97110 THERAPEUTIC EXERCISES: CPT

## 2022-11-09 PROCEDURE — 97530 THERAPEUTIC ACTIVITIES: CPT

## 2022-11-09 PROCEDURE — 97140 MANUAL THERAPY 1/> REGIONS: CPT

## 2022-11-09 NOTE — PROGRESS NOTES
PT DAILY TREATMENT NOTE     Patient Name: Cesilia Orta  Date:2022  : 1955  [x]  Patient  Verified  Payor: VA MEDICARE / Plan: VA MEDICARE PART A & B / Product Type: Medicare /    In time:2:16  Out time:3:15  Total Treatment Time (min): 61  Visit #: 2 of 12    Medicare/BCBS Only   Total Timed Codes (min):  49 1:1 Treatment Time:  49       Treatment Area: Neck pain [M54.2]    SUBJECTIVE  Pain Level (0-10 scale): 4/10  Any medication changes, allergies to medications, adverse drug reactions, diagnosis change, or new procedure performed?: [x] No    [] Yes (see summary sheet for update)  Subjective functional status/changes:   [] No changes reported  Pt reports increased headache and neck pain since  in the middle to high part of the back of his neck. Pt has been doing his exercises at home. He has a heating pad at home he uses for his neck. He has been a back sleeper since his arm surgeries but it causes him to wake up with neck pain.      OBJECTIVE    Modality rationale: decrease pain and increase tissue extensibility to improve the patients ability to decrease headache symptoms   Min Type Additional Details    [] Estim:  []Unatt       []IFC  []Premod                        []Other:  []w/ice   []w/heat  Position:  Location:    [] Estim: []Att    []TENS instruct  []NMES                    []Other:  []w/US   []w/ice   []w/heat  Position:  Location:    []  Traction: [] Cervical       []Lumbar                       [] Prone          []Supine                       []Intermittent   []Continuous Lbs:  [] before manual  [] after manual    []  Ultrasound: []Continuous   [] Pulsed                           []1MHz   []3MHz W/cm2:  Location:    []  Iontophoresis with dexamethasone         Location: [] Take home patch   [] In clinic   10 []  Ice     [x]  heat  []  Ice massage  []  Laser   []  Anodyne Position: supine  Location: c/s    []  Laser with stim  []  Other:  Position:  Location:    [] Vasopneumatic Device    []  Right     []  Left  Pre-treatment girth:  Post-treatment girth:  Measured at (location):  Pressure:       [] lo [] med [] hi   Temperature: [] lo [] med [] hi   [x] Skin assessment post-treatment:  [x]intact []redness- no adverse reaction    []redness - adverse reaction:     15 min Therapeutic Exercise:  [x] See flow sheet :   Rationale: increase ROM, increase strength, improve coordination, improve balance, and increase proprioception to improve the patients ability to improve postural awareness    17 min Therapeutic Activity:  [x]  See flow sheet : posture re-ed; education on supine to relax neck with headaches; education on tennis ball SOR at home   Rationale: increase ROM, increase strength, improve coordination, improve balance, and increase proprioception  to improve the patients ability to improve posture and decrease headaches     17 min Manual Therapy:  SOR; PA mobs grade III-IV c/s; TPR c/s paraspinals; scalenes; left MET for elevated first rib; MET for left c/s rotation C5-C7   The manual therapy interventions were performed at a separate and distinct time from the therapeutic activities interventions. Rationale: decrease pain, increase ROM, and increase tissue extensibility to improve posture and decrease headaches          With   [] TE   [] TA   [] neuro   [] other: Patient Education: [x] Review HEP    [] Progressed/Changed HEP based on:   [] positioning   [] body mechanics   [] transfers   [] heat/ice application    [] other:      Other Objective/Functional Measures: Forward head/shoulder posture  Hypomobile lower c/s  Tightness of the suboccipitals  T/s hypomobility  Reduction of headache with manual and supine interventions  Educated to work on supine and SOR with tennis balls when he has a headache    Pain Level (0-10 scale) post treatment: 2/10    ASSESSMENT/Changes in Function: initiated exercises per POC towards established goals.  Pt continues with forward head and shoulder posture contributing to cervicogenic headaches and pain. He demonstrates decreased lower c/s mobility and t/s hypomobility. Will work to improve postural endurance and neutral c/s posture to reduce pain and tension for ease of ADLs and sleeping. Patient will continue to benefit from skilled PT services to modify and progress therapeutic interventions, address functional mobility deficits, address ROM deficits, address strength deficits, analyze and address soft tissue restrictions, analyze and cue movement patterns, analyze and modify body mechanics/ergonomics, assess and modify postural abnormalities, address imbalance/dizziness, and instruct in home and community integration to attain remaining goals. [x]  See Plan of Care  [x]  See progress note/recertification  []  See Discharge Summary         Progress towards goals / Updated goals:  Short Term Goals: To be accomplished in 1 weeks:  Goal: Pt will be compliant with initial HEP in order to optimize therapy outcomes   Status at evaluation/last progress note: Initiated at eval  MET     Long Term Goals: To be accomplished in 4 weeks:  Goal: Pt will improve FOTO by 7 points in order to demonstrate functional improvement. Status at evaluation/last progress note: 53/100     2. Goal: Pt will report 50% improvement in symptoms since start of care in order to demonstrate improvement in QOL   Status at evaluation/last progress note: 0% as basline      3. Goal: Pt will report </= 4 headaches per week in order to improve QOL  Status at evaluation/last progress note: daily headaches      4. Goal: Pt will improve left cervical rotation AROM to 55* in order to improve ease of ADL's and checking blind spots while driving. Status at evaluation/last progress note: 31*      5. Goal: Pt will report a little difficulty with turning to look behind while driving in order to demonstrate improved ease of driving.    Status at evaluation/last progress note: moderate difficulty reported      PLAN  [x]  Upgrade activities as tolerated     [x]  Continue plan of care  []  Update interventions per flow sheet       []  Discharge due to:_  []  Other:_      Elida Ro, BIRGIT 11/9/2022  7:48 AM    Future Appointments   Date Time Provider [place]   11/9/2022  2:15 PM Guillermina Hernandez, PTA MMCPTPB [facility]   11/11/2022  3:45 PM Guillermina Hernandez, PTA [facility]   11/15/2022  2:15 PM Guillermina Hernandez, PTA [facility]   11/17/2022  1:30 PM Guillermina Hernandez, PTA [facility]   11/18/2022  1:10 PM Britney Tatum PA-C [facility]   11/22/2022  2:15 PM Guillermina Hernandez, PTA [facility]CP[facility]   11/29/2022  2:15 PM Shelah Zuleika, PT [facility]CP[facility]   12/1/2022  1:30 PM Shelah Zuleika, PT [id number] [facility]   12/6/2022  2:15 PM [place] S, PT [id number] [facility]   12/8/2022  1:30 PM [place] Cl[place] S, PT [id number] [facility]   12/13/2022  2:15 PM Guillermina David, PTA [facility]   12/15/2022  1:30 PM [place] Cluck S, PT [id number] [facility]   12/20/2022  2:15 PM [place] Cl[place] S, PT EIGNUTY [facility]   12/22/2022  2:15 PM Guillermina David, PTA MMCPTP[facility]   12/27/2022  1:40 PM [facility]SE [facility]   12/28/2022  1:30 PM Guillermina Hernandez PTA MMCPTPB [facility]   12/30/2022  1:30 PM [facility]illermina Hernandez PTA [facility]   1/4/2023  1:15 PM Given, Justine Sharma MD [id number]   1/17/2023  1:30 PM Gopal Briceño MD [facility] BS AMB   2/6/2023  2:45 PM Gopal Briceño MD [facility] AMB

## 2022-11-11 ENCOUNTER — HOSPITAL ENCOUNTER (OUTPATIENT)
Dept: PHYSICAL THERAPY | Age: 67
Discharge: HOME | End: 2022-11-11
Payer: MEDICARE

## 2022-11-11 PROCEDURE — 97110 THERAPEUTIC EXERCISES: CPT

## 2022-11-11 PROCEDURE — 97140 MANUAL THERAPY 1/> REGIONS: CPT

## 2022-11-11 NOTE — PROGRESS NOTES
PT DAILY TREATMENT NOTE     Patient Name: Crys Heading  Date:2022  : 1955  [x]  Patient  Verified  Payor: [place] MEDICARE / Plan: [place] MEDICARE PART A & B / Product Type: Medicare /    In time: 3:45 Out time: 4:40  Total Treatment Time (min): 55  Visit #: 3 of 12    Medicare/BCBS Only   Total Timed Codes (min):  45 1:1 Treatment Time:  45       Treatment Area: Neck pain [M54.2]    SUBJECTIVE  Pain Level (0-10 scale): 3/10  Any medication changes, allergies to medications, adverse drug reactions, diagnosis change, or new procedure performed?: [x] No    [] Yes (see summary sheet for update)  Subjective functional status/changes:   [] No changes reported  Pt reports less headache today but increased some with a lot of driving and shopping today. He states the left side of his neck feels tighter than the right. He isn't sure where the injection will go in his neck but thinks the MRI showed nerve compression on the left.      OBJECTIVE    Modality rationale: decrease pain and increase tissue extensibility to improve the patients ability to decrease headache symptoms   Min Type Additional Details    [] Estim:  []Unatt       []IFC  []Premod                        []Other:  []w/ice   []w/heat  Position:  Location:    [] Estim: []Att    []TENS instruct  []NMES                    []Other:  []w/US   []w/ice   []w/heat  Position:  Location:    []  Traction: [] Cervical       []Lumbar                       [] Prone          []Supine                       []Intermittent   []Continuous Lbs:  [] before manual  [] after manual    []  Ultrasound: []Continuous   [] Pulsed                           []1MHz   []3MHz W/cm2:  Location:    []  Iontophoresis with dexamethasone         Location: [] Take home patch   [] In clinic   10 []  Ice     [x]  heat  []  Ice massage  []  Laser   []  Anodyne Position: supine  Location: c/s    []  Laser with stim  []  Other:  Position:  Location:    []  Vasopneumatic Device    []  Right []  Left  Pre-treatment girth:  Post-treatment girth:  Measured at (location):  Pressure:       [] lo [] med [] hi   Temperature: [] lo [] med [] hi   [x] Skin assessment post-treatment:  [x]intact []redness- no adverse reaction    []redness - adverse reaction:     30 min Therapeutic Exercise:  [x] See flow sheet :   Rationale: increase ROM, increase strength, improve coordination, improve balance, and increase proprioception to improve the patients ability to improve postural awareness    15 min Manual Therapy:  STM left UT, levator scapulae, PA c/s mobs grade III-IV   The manual therapy interventions were performed at a separate and distinct time from the therapeutic activities interventions. Rationale: decrease pain, increase ROM, and increase tissue extensibility to improve posture and decrease headaches          With   [] TE   [] TA   [] neuro   [] other: Patient Education: [x] Review HEP    [] Progressed/Changed HEP based on:   [] positioning   [] body mechanics   [] transfers   [] heat/ice application    [] other:      Other Objective/Functional Measures:   Cues for c/s retraction with all exercises  Reduced pain post manual  Cues for posture with all exercises and added new exercises per flow sheet  Educated to have wife remind of posture at home to aide in building endurance and self awareness    Pain Level (0-10 scale) post treatment: 1-2/10    ASSESSMENT/Changes in Function: Pt progressing with reducing headaches and pain as he becomes more aware of reducing forward head posture. He has good relief of headaches with SOR. He has hypomobility in the t/s and lower c/s contributing to challenged with c/s retraction and scap retraction. He will benefit from continued progression of mobility and postural endurance for decreased pain and headaches.      Patient will continue to benefit from skilled PT services to modify and progress therapeutic interventions, address functional mobility deficits, address ROM deficits, address strength deficits, analyze and address soft tissue restrictions, analyze and cue movement patterns, analyze and modify body mechanics/ergonomics, assess and modify postural abnormalities, address imbalance/dizziness, and instruct in home and community integration to attain remaining goals. [x]  See Plan of Care  [x]  See progress note/recertification  []  See Discharge Summary         Progress towards goals / Updated goals:  Short Term Goals: To be accomplished in 1 weeks:  Goal: Pt will be compliant with initial HEP in order to optimize therapy outcomes   Status at evaluation/last progress note: Initiated at eval  MET     Long Term Goals: To be accomplished in 4 weeks:  Goal: Pt will improve FOTO by 7 points in order to demonstrate functional improvement. Status at evaluation/last progress note: 53/100     2. Goal: Pt will report 50% improvement in symptoms since start of care in order to demonstrate improvement in QOL   Status at evaluation/last progress note: 0% as basline      3. Goal: Pt will report </= 4 headaches per week in order to improve QOL  Status at evaluation/last progress note: daily headaches      4. Goal: Pt will improve left cervical rotation AROM to 55* in order to improve ease of ADL's and checking blind spots while driving. Status at evaluation/last progress note: 31*      5. Goal: Pt will report a little difficulty with turning to look behind while driving in order to demonstrate improved ease of driving.    Status at evaluation/last progress note: moderate difficulty reported      PLAN  [x]  Upgrade activities as tolerated     [x]  Continue plan of care  []  Update interventions per flow sheet       []  Discharge due to:_  []  Other:_      Ami Current, PTA 11/11/2022  7:48 AM    Future Appointments   Date Time Provider Dex Hatfield   11/11/2022  3:45 PM Nenita Sims PTA [facility]   11/15/2022  2:15 PM Nenita Sims PTA [facility] 11/17/2022  1:30 PM Texie Slipper, PTA [facility]   11/18/2022  1:10 PM Haley Jesus PA-C [facility] AMB   11/22/2022  2:15 PM Texie Slipper, PTA [facility]   11/29/2022  2:15 PM Boneta Plane, PT [facility]   12/1/2022  1:30 PM Boneta Plane, PT [id number] [facility]   12/6/2022  2:15 PM Nahum Deter S, PT [id number] [facility]   12/8/2022  1:30 PM Boneta Plane, PT [id number] [facility]   12/13/2022  2:15 PM Texie Slipper, PTA [facility]CP[facility]   12/15/2022  1:30 PM Nahum Deter S, PT [id number] [facility]   12/20/2022  2:15 PM Nahum Deter S, PT [id number] [facility]   12/22/2022  2:15 PM Texie Slipper, PTA [facility]CPTP[facility]   12/27/2022  1:40 PM [facility]   12/28/2022  1:30 PM Texie Slipper, PTA [facility]CP[facility]   12/30/2022  1:30 PM Texie Slipper, PTA [facility]   1/4/2023  1:15 PM Katlyn Chinchilla MD [id number]   1/17/2023  1:30 PM Cali Hein MD [facility] BS AMB   2/6/2023  2:45 PM Cali Hein MD [facility] BS AMB

## 2022-11-15 ENCOUNTER — HOSPITAL ENCOUNTER (OUTPATIENT)
Dept: PHYSICAL THERAPY | Age: 67
Discharge: HOME | End: 2022-11-15
Payer: MEDICARE

## 2022-11-15 PROCEDURE — 97140 MANUAL THERAPY 1/> REGIONS: CPT

## 2022-11-15 PROCEDURE — 97110 THERAPEUTIC EXERCISES: CPT

## 2022-11-15 NOTE — PROGRESS NOTES
PT DAILY TREATMENT NOTE     Patient Name: Pamela Contreras  Date:11/15/2022  : 1955  [x]  Patient  Verified  Payor: VA MEDICARE / Plan: VA MEDICARE PART A & B / Product Type: Medicare /    In time: 2:20 Out time: 3:20  Total Treatment Time (min): 60min  Visit #: 4 of 12    Medicare/BCBS Only   Total Timed Codes (min):  50 1:1 Treatment Time:  50       Treatment Area: Neck pain [M54.2]    SUBJECTIVE  Pain Level (0-10 scale): 3/10 at his neck  Any medication changes, allergies to medications, adverse drug reactions, diagnosis change, or new procedure performed?: [x] No    [] Yes (see summary sheet for update)  Subjective functional status/changes:   [] No changes reported  Pt states yesterday that his headache was bad but today it is less intense. He says that he has been more aware of his forward head posture and trying to self correct. He said he sees his doctor about getting an injection for his neck on the . After today he is going to lay the back of his head on tennis balls to try and ease his headache.      OBJECTIVE    Modality rationale: decrease pain and increase tissue extensibility to improve the patients ability to decrease headache symptoms   Min Type Additional Details    [] Estim:  []Unatt       []IFC  []Premod                        []Other:  []w/ice   []w/heat  Position:  Location:    [] Estim: []Att    []TENS instruct  []NMES                    []Other:  []w/US   []w/ice   []w/heat  Position:  Location:    []  Traction: [] Cervical       []Lumbar                       [] Prone          []Supine                       []Intermittent   []Continuous Lbs:  [] before manual  [] after manual    []  Ultrasound: []Continuous   [] Pulsed                           []1MHz   []3MHz W/cm2:  Location:    []  Iontophoresis with dexamethasone         Location: [] Take home patch   [] In clinic   10 []  Ice     [x]  heat  []  Ice massage  []  Laser   []  Anodyne Position: supine with slight head elevation and wedge under legs  Location:around posterior part of neck     []  Laser with stim  []  Other:  Position:  Location:    []  Vasopneumatic Device    []  Right     []  Left  Pre-treatment girth:  Post-treatment girth:  Measured at (location):  Pressure:       [] lo [] med [] hi   Temperature: [] lo [] med [] hi   [x] Skin assessment post-treatment:  [x]intact []redness- no adverse reaction    []redness - adverse reaction:     42 min Therapeutic Exercise:  [x] See flow sheet :   Rationale: increase ROM, increase strength, improve coordination, improve balance, and increase proprioception to improve the patients ability to improve postural awareness    8 min Manual Therapy:  SOR to relieve headache    The manual therapy interventions were performed at a separate and distinct time from the therapeutic activities interventions. Rationale: decrease pain, increase ROM, and increase tissue extensibility to improve decrease headache          With   [] TE   [] TA   [] neuro   [] other: Patient Education: [x] Review HEP    [] Progressed/Changed HEP based on:   [] positioning   [] body mechanics   [] transfers   [] heat/ice application    [x] other: reviewed proper execution of SNAG     Other Objective/Functional Measures:   Needed verbal and tactile cues for correct hand placement with right rotation SNAG   Verbal and tactile cues needed for SA punch in supine   Slight reduction in headache post SOR    Pain Level (0-10 scale) post treatment: 2/10    ASSESSMENT/Changes in Function:  Pt has shown an overall improvement with his pain since the beginning of therapy. He continues to present with forward head posture but has improved awareness since starting therapy. He continues to need postural strengthening and re-ed to reduce the recurrence of headaches for pain free ADLs such as hiking and taking care of his granddaughter.     Patient will continue to benefit from skilled PT services to modify and progress therapeutic interventions, address functional mobility deficits, address ROM deficits, address strength deficits, analyze and address soft tissue restrictions, analyze and cue movement patterns, analyze and modify body mechanics/ergonomics, assess and modify postural abnormalities, address imbalance/dizziness, and instruct in home and community integration to attain remaining goals. [x]  See Plan of Care  [x]  See progress note/recertification  []  See Discharge Summary         Progress towards goals / Updated goals:  Short Term Goals: To be accomplished in 1 weeks:  Goal: Pt will be compliant with initial HEP in order to optimize therapy outcomes   Status at evaluation/last progress note: Initiated at eval  MET     Long Term Goals: To be accomplished in 4 weeks:  Goal: Pt will improve FOTO by 7 points in order to demonstrate functional improvement. Status at evaluation/last progress note: 53/100     2. Goal: Pt will report 50% improvement in symptoms since start of care in order to demonstrate improvement in QOL   Status at evaluation/last progress note: 0% as baseline      3. Goal: Pt will report </= 4 headaches per week in order to improve QOL  Status at evaluation/last progress note: daily headaches      4. Goal: Pt will improve left cervical rotation AROM to 55* in order to improve ease of ADL's and checking blind spots while driving. Status at evaluation/last progress note: 31*      5. Goal: Pt will report a little difficulty with turning to look behind while driving in order to demonstrate improved ease of driving. Status at evaluation/last progress note: moderate difficulty reported      PLAN  [x]  Upgrade activities as tolerated     [x]  Continue plan of care  []  Update interventions per flow sheet       []  Discharge due to:_  []  Other:_      Pauline Everett, [address] [place] [address] 11/15/2022  7:48 AM    I was present during the entire treatment, directing and participating in the treatment.    Ladarius Cassidy Steven Snyder     [facility] Appointments   Date Time Provider Dex Hatfield   11/15/2022  2:15 PM Ramsey Sanchez, PTA [facility]   11/17/2022  1:30 PM Ramsey Sanchez, PTA [facility]   11/18/2022  1:10 PM Erin Hunt PA-C [facility] BS AMB   11/22/2022  2:15 PM Ramsey Sanchez, PTA [facility]   11/29/2022  2:15 PM [facility], PT [facility]   12/1/2022  1:30 PM [facility], PT [facility]   12/6/2022  2:15 PM [facility], PT [id number] [facility]   12/8/2022  1:30 PM [facility], PT [id number] [facility]   12/13/2022  2:15 PM Ramsey Sanchez, PTA [facility]   12/15/2022  1:30 PM Bhupendra MUNIZ, PT [id number] [facility]   12/20/2022  2:15 PM Bhupendra MUNIZ, PT [id number] [facility]   12/22/2022  2:15 PM Ramsey Sanchez, PTA [facility]   12/27/2022  1:40 PM [facility]   12/28/2022  1:30 PM Ramsey Sanchez, PTA [facility]   12/30/2022  1:30 PM Ramsey Sanchez, PTA [facility]   1/4/2023  1:15 PM Fortino Chinchilla MD [address] [place] [address]   1/17/2023  1:30 PM MD NINA Mayes AMB   2/6/2023  2:45 PM MD AMY MayesMO BS AMB

## 2022-11-17 ENCOUNTER — HOSPITAL ENCOUNTER (OUTPATIENT)
Dept: PHYSICAL THERAPY | Age: 67
Discharge: HOME | End: 2022-11-17
Payer: MEDICARE

## 2022-11-17 PROCEDURE — 97140 MANUAL THERAPY 1/> REGIONS: CPT

## 2022-11-17 PROCEDURE — 97110 THERAPEUTIC EXERCISES: CPT

## 2022-11-17 NOTE — PROGRESS NOTES
PT DAILY TREATMENT NOTE     Patient Name: Donnamae Goldberg  Date:2022  : 1955  [x]  Patient  Verified  Payor: VA MEDICARE / Plan: VA MEDICARE PART A & B / Product Type: Medicare /    In time: 1:32 Out time: 2:34  Total Treatment Time (min): 62  Visit #: 5 of 12    Medicare/BCBS Only   Total Timed Codes (min):  52 1:1 Treatment Time:  52       Treatment Area: Neck pain [M54.2]    SUBJECTIVE  Pain Level (0-10 scale): 2-3/10 neck; 3-4/10 headache  Any medication changes, allergies to medications, adverse drug reactions, diagnosis change, or new procedure performed?: [x] No    [] Yes (see summary sheet for update)  Subjective functional status/changes:   [] No changes   Pt reports taking medicine about 30 minutes before therapy to help his very slight headache. He states the left side seems to stay tighter and be where his headaches stem from. He has been more aware of his posture and feels like what we are doing in therapy is helping.      OBJECTIVE    Modality rationale: decrease pain and increase tissue extensibility to improve the patients ability to decrease headache symptoms   Min Type Additional Details    [] Estim:  []Unatt       []IFC  []Premod                        []Other:  []w/ice   []w/heat  Position:  Location:    [] Estim: []Att    []TENS instruct  []NMES                    []Other:  []w/US   []w/ice   []w/heat  Position:  Location:    []  Traction: [] Cervical       []Lumbar                       [] Prone          []Supine                       []Intermittent   []Continuous Lbs:  [] before manual  [] after manual    []  Ultrasound: []Continuous   [] Pulsed                           []1MHz   []3MHz W/cm2:  Location:    []  Iontophoresis with dexamethasone         Location: [] Take home patch   [] In clinic   10 [x]  Ice     [x]  heat  []  Ice massage  []  Laser   []  Anodyne Position: supine with slight head elevation and wedge under legs  Location:around posterior part of neck  and ice to left knee    []  Laser with stim  []  Other:  Position:  Location:    []  Vasopneumatic Device    []  Right     []  Left  Pre-treatment girth:  Post-treatment girth:  Measured at (location):  Pressure:       [] lo [] med [] hi   Temperature: [] lo [] med [] hi   [x] Skin assessment post-treatment:  [x]intact []redness- no adverse reaction    []redness - adverse reaction:     32 min Therapeutic Exercise:  [x] See flow sheet :   Rationale: increase ROM, increase strength, improve coordination, improve balance, and increase proprioception to improve the patients ability to improve postural awareness    20 min Manual Therapy:  TPR left splenius capitus; TPR left scalenes; DTM left upper trap and levator scapulae; MET for left c/s rotation   The manual therapy interventions were performed at a separate and distinct time from the therapeutic activities interventions. Rationale: decrease pain, increase ROM, and increase tissue extensibility to improve decrease headache          With   [] TE   [] TA   [] neuro   [] other: Patient Education: [x] Review HEP    [] Progressed/Changed HEP based on:   [] positioning   [] body mechanics   [] transfers   [] heat/ice application    [] other:      Other Objective/Functional Measures:   Left c/s AROM rotation: 29 degrees  Right c/s AROM rotation: 39 degrees  Hypertonic left c/s musculature compared to right  Continues with hypomobility in the lower c/s and upper t/s creating forward head posture  Decreased open book mobility  Decreased headache post manual    Pain Level (0-10 scale) post treatment: 2/10    ASSESSMENT/Changes in Function: Pt progressing with decreased intensity of headaches but still happening daily. He continues to have decreased c/s AROM rotation left worse than right. He has improved awareness of his posture but struggles with endurance to maintain consistently.  Will continue to progress c/s and t/s mobility while addressing posterior kinetic chain strength and endurance for decreased pain and headaches for ease of household chores and ADLs. Patient will continue to benefit from skilled PT services to modify and progress therapeutic interventions, address functional mobility deficits, address ROM deficits, address strength deficits, analyze and address soft tissue restrictions, analyze and cue movement patterns, analyze and modify body mechanics/ergonomics, assess and modify postural abnormalities, address imbalance/dizziness, and instruct in home and community integration to attain remaining goals. [x]  See Plan of Care  [x]  See progress note/recertification  []  See Discharge Summary         Progress towards goals / Updated goals:  Short Term Goals: To be accomplished in 1 weeks:  Goal: Pt will be compliant with initial HEP in order to optimize therapy outcomes   Status at evaluation/last progress note: Initiated at eval  MET     Long Term Goals: To be accomplished in 4 weeks:  Goal: Pt will improve FOTO by 7 points in order to demonstrate functional improvement. Status at evaluation/last progress note: 53/100     2. Goal: Pt will report 50% improvement in symptoms since start of care in order to demonstrate improvement in QOL   Status at evaluation/last progress note: 0% as baseline      3. Goal: Pt will report </= 4 headaches per week in order to improve QOL  Status at evaluation/last progress note: daily headaches   Continues to have daily headaches but less intensity (11/17/22)     4. Goal: Pt will improve left cervical rotation AROM to 55* in order to improve ease of ADL's and checking blind spots while driving. Status at evaluation/last progress note: 32*   Limited progress prior to SNAGs  Left c/s AROM rotation: 29 degrees  Right c/s AROM rotation: 39 degrees (11/17/22)     5. Goal: Pt will report a little difficulty with turning to look behind while driving in order to demonstrate improved ease of driving.    Status at evaluation/last progress note: moderate difficulty reported      PLAN  [x]  Upgrade activities as tolerated     [x]  Continue plan of care  []  Update interventions per flow sheet       []  Discharge due to:_  []  Other:_      Jorge Pete, PTA 11/17/2022  7:48 AM        Future Appointments   Date Time Provider Dex Hatfield   11/17/2022  1:30 PM Rafita Ro, PTA [facility]   11/18/2022  1:10 PM Nithya Ramirez PA-C [facility] BS AMB   11/22/2022  2:15 PM Rafita Ro, PTA [facility]   11/29/2022  2:15 PM Shashi Gamez, PT [id number] [facility]   12/1/2022  1:30 PM Shashi Gamez, PT [id number] [facility]   12/6/2022  2:15 PM Roshni MUNIZ, PT [email or web address] [facility]   12/8/2022  1:30 PM Roshni MUNIZ, PT [id number] [facility]   12/13/2022  2:15 PM Rafita Ro, PTA [facility]CPTP[facility]   12/15/2022  1:30 PM Roshni MUNIZ, PT [id number] [facility]   12/20/2022  2:15 PM Shashi Gamez, PT [id number] [facility]   12/22/2022  2:15 PM Rafita Ro, PTA [facility]CPTP[facility]   12/27/2022  1:40 PM [facility] NURSE [facility]   12/28/2022  1:30 PM Rafita Ro, PTA [facility]CP[facility]   12/30/2022  1:30 PM Rafita Ro, PTA [facility]CP[facility]   1/4/2023  1:15 PM Candice Chinchilla MD [address] [facility]   1/17/2023  1:30 PM Viktor Mello MD [facility] BS AMB   2/6/2023  2:45 PM Viktor Mello MD [facility] BS AMB

## 2022-11-18 ENCOUNTER — OFFICE VISIT (OUTPATIENT)
Dept: ORTHOPEDIC SURGERY | Age: 67
End: 2022-11-18
Payer: MEDICARE

## 2022-11-18 VITALS — HEIGHT: 70 IN | WEIGHT: 233 LBS | BODY MASS INDEX: 33.36 KG/M2 | TEMPERATURE: 97.3 F

## 2022-11-18 DIAGNOSIS — M25.562 LEFT KNEE PAIN, UNSPECIFIED CHRONICITY: ICD-10-CM

## 2022-11-18 DIAGNOSIS — M70.52 PES ANSERINUS BURSITIS OF LEFT KNEE: ICD-10-CM

## 2022-11-18 DIAGNOSIS — Z96.652 S/P REVISION OF TOTAL KNEE, LEFT: Primary | ICD-10-CM

## 2022-11-18 PROCEDURE — 1101F PT FALLS ASSESS-DOCD LE1/YR: CPT | Performed by: PHYSICIAN ASSISTANT

## 2022-11-18 PROCEDURE — G8432 DEP SCR NOT DOC, RNG: HCPCS | Performed by: PHYSICIAN ASSISTANT

## 2022-11-18 PROCEDURE — G8427 DOCREV CUR MEDS BY ELIG CLIN: HCPCS | Performed by: PHYSICIAN ASSISTANT

## 2022-11-18 PROCEDURE — G8536 NO DOC ELDER MAL SCRN: HCPCS | Performed by: PHYSICIAN ASSISTANT

## 2022-11-18 PROCEDURE — 99214 OFFICE O/P EST MOD 30 MIN: CPT | Performed by: PHYSICIAN ASSISTANT

## 2022-11-18 PROCEDURE — G8417 CALC BMI ABV UP PARAM F/U: HCPCS | Performed by: PHYSICIAN ASSISTANT

## 2022-11-18 PROCEDURE — G8756 NO BP MEASURE DOC: HCPCS | Performed by: PHYSICIAN ASSISTANT

## 2022-11-18 PROCEDURE — 73562 X-RAY EXAM OF KNEE 3: CPT | Performed by: PHYSICIAN ASSISTANT

## 2022-11-18 PROCEDURE — 3017F COLORECTAL CA SCREEN DOC REV: CPT | Performed by: PHYSICIAN ASSISTANT

## 2022-11-18 PROCEDURE — 1124F ACP DISCUSS-NO DSCNMKR DOCD: CPT | Performed by: PHYSICIAN ASSISTANT

## 2022-11-18 NOTE — PROGRESS NOTES
[address] [facility], [address] [facility]  [phone]           Patient: Raquel Glass                MRN: [id number]       SSN: [id number]  YOB: 1955        AGE: 69 y.o. SEX: male  Body mass index is 33.43 kg/m². PCP: Kim Hodgkins, NP  11/18/22      This office note has been dictated. REVIEW OF SYSTEMS:  Constitutional: Negative for fever, chills, weight loss and malaise/fatigue. HENT: Negative. Eyes: Negative. Respiratory: Negative. Cardiovascular: Negative. Gastrointestinal: No bowel incontinence or constipation. Genitourinary: No bladder incontinence or saddle anesthesia. Skin: Negative. Neurological: Negative. Endo/Heme/Allergies: Negative. Psychiatric/Behavioral: Negative. Musculoskeletal: As per HPI above. Past Medical History:   Diagnosis Date    Arthritis     Chronic pain     knee and shoulder    DVT (deep venous thrombosis) ([facility] [address] [address] 1992    post sx. R LEG    DVT (deep venous thrombosis) ([facility]) 2000    POST BACK SX. 2- LEFT LEG    GERD (gastroesophageal reflux disease)     Headache(784.0)     everyday    High cholesterol     Hypertension     Prostate cancer ([address] 2018    Pure hypercholesterolemia     Spinal stenosis     Thromboembolus ([facility])          Current Outpatient Medications:     diclofenac (VOLTAREN) 1 % gel, Apply 4 g to affected area four (4) times daily. , Disp: 100 g, Rfl: 2    gabapentin (NEURONTIN) 100 mg capsule, Take 1 in the morning and 2 capsules at bedtime as directed  Indications: neuropathic pain, Disp: 270 Capsule, Rfl: 1    ondansetron hcl (Zofran) 4 mg tablet, Take 1 Tablet by mouth every eight (8) hours as needed for Nausea or Vomiting., Disp: 20 Tablet, Rfl: 2    metaxalone (Skelaxin) 800 mg tablet, Take 1 Tablet by mouth three (3) times daily.  Take as needed  Indications: muscle spasm, Disp: 90 Tablet, Rfl: 3    clotrimazole-betamethasone (LOTRISONE) 1-0.05 % lotion, Apply  to affected area two (2) times a day., Disp: 30 mL, Rfl: 0    allopurinoL (ZYLOPRIM) 100 mg tablet, Take 1 Tablet by mouth two (2) times a day., Disp: 60 Tablet, Rfl: 0    L gasseri/B bifidum/B longum (MCALLISTER [employer] [facility] PO), Take 1 Tablet by mouth nightly., Disp: , Rfl:     lansoprazole (PREVACID) 30 mg capsule, Take 30 mg by mouth daily. , Disp: , Rfl:     warfarin (COUMADIN) 5 mg tablet, TAKE 2 AND 1/2 TABLETS BY MOUTH DAILY OR AS DIRECTED (Patient taking differently: Take  by mouth daily. Patient takes 2  TABLETS BY MOUTH Daily or as directed), Disp: 75 Tab, Rfl: 0    lisinopril-hydroCHLOROthiazide (PRINZIDE, ZESTORETIC) 20-12.5 mg per tablet, TAKE 1 TABLET BY MOUTH DAILY (Patient taking differently: Take 1 Tablet by mouth daily. TAKE 1 TABLET BY MOUTH DAILY), Disp: 90 Tab, Rfl: 1    labetalol (NORMODYNE) 100 mg tablet, TAKE 1 TABLET BY MOUTH TWICE DAILY. STOP VERAPAMIL (Patient taking differently: Take  by mouth two (2) times a day.), Disp: 180 Tab, Rfl: 0    butalbital-acetaminophen-caff (FIORICET) -40 mg per capsule, 2 cap three times per day as needed for headache (Patient taking differently: Take 1 Capsule by mouth daily. 2 cap three times per day as needed for headache), Disp: 180 Cap, Rfl: 1    ALPRAZolam (XANAX) 0.5 mg tablet, Take one half(1/2) tab to one(1) tab by mouth at bedtime as needed for sleep (Patient taking differently: Take  by mouth nightly as needed.  Take one half(1/2) tab to one(1) tab by mouth at bedtime as needed for sleep), Disp: 30 Tab, Rfl: 0    montelukast (SINGULAIR) 10 mg tablet, TAKE 1 TABLET BY MOUTH EVERY DAY, Disp: 90 Tab, Rfl: 0    acetaminophen (TYLENOL) 500 mg tablet, Take 1,000 mg by mouth every six (6) hours as needed for Pain., Disp: , Rfl:     methylPREDNISolone (MEDROL DOSEPACK) 4 mg tablet, Per dose pack instructions (Patient not taking: No sig reported), Disp: 1 Dose Pack, Rfl: 0    doxycycline (MONODOX) 100 mg capsule, Take 100 mg by mouth two (2) times a day. (Patient not taking: No sig reported), Disp: , Rfl:     methylPREDNISolone (MEDROL DOSEPACK) 4 mg tablet, Per dose pack instructions (Patient not taking: No sig reported), Disp: 1 Dose Pack, Rfl: 0    Allergies   Allergen Reactions    Amoxicillin Itching    Augmentin [Amoxicillin-Pot Clavulanate] Itching    Chlorhexidine Unknown (comments)    Chlorhexidine Towelette Itching    Hibiclens [Chlorhexidine Gluconate] Itching    Milk Containing Products Diarrhea    Nsaids (Non-Steroidal Anti-Inflammatory Drug) Other (comments)     On blood thinner, contraindicated.      Penicillins Rash    Requip [Ropinirole] Nausea and Vomiting    Simvastatin Other (comments)       Social History     Socioeconomic History    Marital status:      Spouse name: Not on file    Number of children: Not on file    Years of education: Not on file    Highest education level: Not on file   Occupational History    Not on file   Tobacco Use    Smoking status: Never    Smokeless tobacco: Never   Vaping Use    Vaping Use: Never used   Substance and Sexual Activity    Alcohol use: No    Drug use: Never    Sexual activity: Not Currently   Other Topics Concern     Service Not Asked    Blood Transfusions Not Asked    Caffeine Concern Not Asked    Occupational Exposure Not Asked    Hobby Hazards Not Asked    Sleep Concern Not Asked    Stress Concern Not Asked    Weight Concern Not Asked    Special Diet Not Asked    Back Care Not Asked    Exercise Not Asked    Bike Helmet Not Asked    Seat Belt Not Asked    Self-Exams Not Asked   Social History Narrative    Not on file     Social Determinants of Health     Financial Resource Strain: Not on file   Food Insecurity: Not on file   Transportation Needs: Not on file   Physical Activity: Not on file   Stress: Not on file   Social Connections: Not on file   Intimate Partner Violence: Not on file   Housing Stability: Not on file       Past Surgical History:   Procedure Laterality Date HX HEENT Right 09/11/2018    eye surgery, macular     HX KNEE REPLACEMENT Left 2018    HX LUMBAR LAMINECTOMY  1992    HX LUMBAR LAMINECTOMY  2000    HX ORTHOPAEDIC  06-25-12    Right foot with excision of bursa and adipose tissue from fifth metatarsal base by Dr. Sandrine Longoria Left 03/09/2022    left knee revision    HX PROSTATECTOMY  11/2018    HX ROTATOR CUFF REPAIR Right 01/28/2019    by Dr. Nick Pino SHOULDER ARTHROSCOPY Left 12/2020    WORK RELATED INJURY         Patient seen evaluated today for his revision left knee replacement. He is now 8 months status post surgery doing well. He is pleased with the results. Does have a little bit of stiffness and achiness in the knee at times. Overall he is much improved from where he was preoperatively. He has finished up his outpatient physical therapy without complications. He does continue with a home exercise program.    Patient denies recent fevers, chills, chest pain, SOB, or injuries. No recent systemic changes noted. A 12-point review of systems is performed today. Pertinent positives are noted. All other systems reviewed and otherwise are negative. Physical exam: General: Alert and oriented x3, nad.  well-developed, well nourished. normal affect, AF. NC/AT, EOMI, neck supple, trachea midline, no JVD present. Breathing is non-labored. Examination of the lower extremities reveals pain-free range of motion the hips. There is no pain to palpation the trochanter bursa. Negative straight leg raise. Negative calf tenderness. Negative Homans. No signs of DVT present. The left knee reveals skin intact. There is no erythema or ecchymosis noted. There are no signs for infection or cellulitis present. Range of motion is full extension to about 110 degrees of flexion. Patella tracks nicely without rubs or crepitus noted. Stability is good.     Radiographs in office today 11/18/2022 at the [facility] location include AP, lateral, skyline of the left knee shows a total knee components to be well fixed without evidence for loosening or fracture noted. Assessment: Status post revision left knee replacement    Plan: At this point, the patient will continue with home exercise program.  He will continue the Voltaren gel. We will plan on seeing him back in 6 months time for evaluation. He will call with any questions or concerns or should arise.             JR Willie GATES, PA-C, ATC

## 2022-11-22 ENCOUNTER — HOSPITAL ENCOUNTER (OUTPATIENT)
Dept: PHYSICAL THERAPY | Age: 67
Discharge: HOME | End: 2022-11-22
Payer: MEDICARE

## 2022-11-22 PROCEDURE — 97110 THERAPEUTIC EXERCISES: CPT

## 2022-11-22 NOTE — PROGRESS NOTES
PT DAILY TREATMENT NOTE     Patient Name: Brie Marking  Date:2022  : 1955  [x]  Patient  Verified  Payor: [place] MEDICARE / Plan: [place] MEDICARE PART A & B / Product Type: Medicare /    In time: 2:18 Out time: 3:18  Total Treatment Time (min): 60  Visit #: 6 of 12    Medicare/BCBS Only   Total Timed Codes (min):  50 1:1 Treatment Time:  50       Treatment Area: Neck pain [M54.2]    SUBJECTIVE  Pain Level (0-10 scale): 3/10  Any medication changes, allergies to medications, adverse drug reactions, diagnosis change, or new procedure performed?: [x] No    [] Yes (see summary sheet for update)  Subjective functional status/changes:   [] No changes   Pt reports overall headaches have been getting better. He sees his doctor tomorrow about getting an injection for his neck. He has periods when his neck bothers him but continues to have daily headaches. He says that his knee feels worst today and he believes its due to yard work. During his session, he reports pain in his biceps with 1/2 prone W's.     OBJECTIVE    Modality rationale: decrease pain and increase tissue extensibility to improve the patients ability to decrease headache symptoms   Min Type Additional Details    [] Estim:  []Unatt       []IFC  []Premod                        []Other:  []w/ice   []w/heat  Position:  Location:    [] Estim: []Att    []TENS instruct  []NMES                    []Other:  []w/US   []w/ice   []w/heat  Position:  Location:    []  Traction: [] Cervical       []Lumbar                       [] Prone          []Supine                       []Intermittent   []Continuous Lbs:  [] before manual  [] after manual    []  Ultrasound: []Continuous   [] Pulsed                           []1MHz   []3MHz W/cm2:  Location:    []  Iontophoresis with dexamethasone         Location: [] Take home patch   [] In clinic   10' []  Ice     [x]  heat  []  Ice massage  []  Laser   []  Anodyne Position: supine with head slightly elevated and with darianster  Location: c/s    []  Laser with stim  []  Other:  Position:  Location:    []  Vasopneumatic Device    []  Right     []  Left  Pre-treatment girth:  Post-treatment girth:  Measured at (location):  Pressure:       [] lo [] med [] hi   Temperature: [] lo [] med [] hi   [x] Skin assessment post-treatment:  [x]intact []redness- no adverse reaction    []redness - adverse reaction:     50 min Therapeutic Exercise:  [x] See flow sheet :   Rationale: increase ROM, increase strength, improve coordination, improve balance, and increase proprioception to improve the patients ability to improve postural awareness          With   [] TE   [] TA   [] neuro   [] other: Patient Education: [x] Review HEP    [] Progressed/Changed HEP based on:   [] positioning   [] body mechanics   [] transfers   [] heat/ice application    [] other:      Other Objective/Functional Measures:   Left open book more limited than the right  Increased Luis Miguel rows from 15# to 20#  Decreased horizonal ABD from 3# to 2#  Verbal and tactile cues needed for correct scapular movement with Luis Miguel exercises  Elbow flexion MMT: 4+/5  Shoulder flexion MMT: left 4-/5 right 3+/5  Pt with complaints of \"bicep\" pain during session but upon further assessment seems to be shoulder flexor weakness with compensatory UT use with fatigue    Pain Level (0-10 scale) post treatment: 2/10    ASSESSMENT/Changes in Function:   Pt continues to come into therapy with headaches and slight neck pain. He demonstrates decreased t/s mobility and decreased endurance with postural muscles. Skilled care is indicated to strengthen posterior neck and shoulder muscles to reduce the daily occurrence of headaches for pain free ADLs.     Patient will continue to benefit from skilled PT services to modify and progress therapeutic interventions, address functional mobility deficits, address ROM deficits, address strength deficits, analyze and address soft tissue restrictions, analyze and cue movement patterns, analyze and modify body mechanics/ergonomics, assess and modify postural abnormalities, address imbalance/dizziness, and instruct in home and community integration to attain remaining goals. [x]  See Plan of Care  [x]  See progress note/recertification  []  See Discharge Summary         Progress towards goals / Updated goals:  Short Term Goals: To be accomplished in 1 weeks:  Goal: Pt will be compliant with initial HEP in order to optimize therapy outcomes   Status at evaluation/last progress note: Initiated at eval  MET     Long Term Goals: To be accomplished in 4 weeks:  Goal: Pt will improve FOTO by 7 points in order to demonstrate functional improvement. Status at evaluation/last progress note: 53/100     2. Goal: Pt will report 50% improvement in symptoms since start of care in order to demonstrate improvement in QOL   Status at evaluation/last progress note: 0% as baseline      3. Goal: Pt will report </= 4 headaches per week in order to improve QOL  Status at evaluation/last progress note: daily headaches   Continues to have daily headaches but less intensity (11/17/22)     4. Goal: Pt will improve left cervical rotation AROM to 55* in order to improve ease of ADL's and checking blind spots while driving. Status at evaluation/last progress note: 32*   Limited progress prior to SNAGs  Left c/s AROM rotation: 29 degrees  Right c/s AROM rotation: 39 degrees (11/17/22)     5. Goal: Pt will report a little difficulty with turning to look behind while driving in order to demonstrate improved ease of driving. Status at evaluation/last progress note: moderate difficulty reported      PLAN  [x]  Upgrade activities as tolerated     [x]  Continue plan of care  []  Update interventions per flow sheet       []  Discharge due to:_  []  Other:_      Yamileth Heredia, [address] [place] [address] 11/22/2022  7:48 AM  I was present during the entire treatment, directing and participating in the treatment.    Brain Lofty Sara Mccollum       Future Appointments   Date Time Provider Dex [place]   11/22/2022  2:15 PM Vasile Myers MMCPTPB [facility]   11/29/2022  2:15 PM Jaden Lemons S, PT MMCPTPB [facility]   12/1/2022  1:30 PM Rosi Alvarez, PT [email or web address] [facility]   12/6/2022  2:30 PM Jaden MUNIZ, PT [id number] [facility]   12/8/2022  2:00 PM Vonnie Began, PTA MMCPTPB [facility]   12/13/2022  2:00 PM Neeta Stains, PTA [facility]CPTPB [facility]   12/15/2022  1:30 PM Myrna Hwang, PT [id number] [facility]   12/20/2022  2:30 PM Rosi Alvarez, PT OYSPRGE [facility]   12/22/2022  2:30 PM Neeta Stains, PTA MMCPTPB [facility]   12/27/2022  1:40 PM [facility] NURSE [facility] ROXANNE SILVA   12/28/2022  1:30 PM Neeta Stains, PTA MMCPTPB [facility]   12/30/2022  1:30 PM Neeta Stains, PTA MMCPTPB [facility]   1/4/2023  1:15 PM Layo Chinchilla MD [place]   1/17/2023  1:30 PM Esequiel Parnell MD [facility]   2/6/2023  2:45 PM Esequiel Parnell MD [facility]   5/18/2023  1:00 PM Dale Pastor PA-C [facility]

## 2022-11-29 ENCOUNTER — HOSPITAL ENCOUNTER (OUTPATIENT)
Dept: PHYSICAL THERAPY | Age: 67
Discharge: HOME | End: 2022-11-29
Payer: MEDICARE

## 2022-11-29 PROCEDURE — 97530 THERAPEUTIC ACTIVITIES: CPT

## 2022-11-29 PROCEDURE — 97110 THERAPEUTIC EXERCISES: CPT

## 2022-11-29 NOTE — PROGRESS NOTES
PT DAILY TREATMENT NOTE     Patient Name: Ileana Benedict  Date:2022  : 1955  [x]  Patient  Verified  Payor: VA MEDICARE / Plan: [place] MEDICARE PART A & B / Product Type: Medicare /    In time: 2:18 Out time: 3:15  Total Treatment Time (min): 57  Visit #: 7 of 12    Medicare/BCBS Only   Total Timed Codes (min):  47 1:1 Treatment Time:  47        Treatment Area: Neck pain [M54.2]    SUBJECTIVE  Pain Level (0-10 scale):3/10  Any medication changes, allergies to medications, adverse drug reactions, diagnosis change, or new procedure performed?: [x] No    [] Yes (see summary sheet for update)  Subjective functional status/changes:   [] No changes   Pt reports a slight headache today. He reports he has been trying to do his exercises daily but has not been recently due to the holidays. He says his left knee hurts more today than his neck which he believes is due to shopping over the weekend. His doctor did say he was going to do a simple injection but needs approval first from his family doctor. During his session today he said that he feels his back working more with the wide  over-hand rows.      OBJECTIVE    Modality rationale: decrease pain and increase tissue extensibility to improve the patients ability to decrease headache symptoms   Min Type Additional Details    [] Estim:  []Unatt       []IFC  []Premod                        []Other:  []w/ice   []w/heat  Position:  Location:    [] Estim: []Att    []TENS instruct  []NMES                    []Other:  []w/US   []w/ice   []w/heat  Position:  Location:    []  Traction: [] Cervical       []Lumbar                       [] Prone          []Supine                       []Intermittent   []Continuous Lbs:  [] before manual  [] after manual    []  Ultrasound: []Continuous   [] Pulsed                           []1MHz   []3MHz W/cm2:  Location:    []  Iontophoresis with dexamethasone         Location: [] Take home patch   [] In clinic   10' [x]  Ice [x]  heat  []  Ice massage  []  Laser   []  Anodyne Position: supine with head and legs elevated  Location: heat on posterior neck  Ice on left anterior knee    []  Laser with stim  []  Other:  Position:  Location:    []  Vasopneumatic Device    []  Right     []  Left  Pre-treatment girth:  Post-treatment girth:  Measured at (location):  Pressure:       [] lo [] med [] hi   Temperature: [] lo [] med [] hi   [x] Skin assessment post-treatment:  [x]intact []redness- no adverse reaction    []redness - adverse reaction:     39 min Therapeutic Exercise:  [x] See flow sheet :   Rationale: increase ROM, increase strength, improve coordination, improve balance, and increase proprioception to improve the patients ability to improve postural awareness    8 min Therapeutic Activity:  [x] See flow sheet : \"No money's\" with active head retraction; t/s mobilization to improve posture awareness   Rationale: increase ROM, increase strength, improve coordination, improve balance, and increase proprioception to improve the patients ability to improve postural awareness          With   [] TE   [] TA   [] neuro   [] other: Patient Education: [x] Review HEP    [] Progressed/Changed HEP based on:   [] positioning   [] body mechanics   [] transfers   [] heat/ice application    [] other:      Other Objective/Functional Measures:   Verbal and tactile cues needed for proper execution of single arm horizontal ABD  Verbal cues needed to correct forward head posture with all Therex    Pain Level (0-10 scale) post treatment: 2/10    ASSESSMENT/Changes in Function:   Pt still presents to therapy with cervicogenic headaches (lessening in intensity) and increased forward head posture with all TherEx. Skilled care is indicated to strengthen postural muscles for improved overall head and shoulder posture for decreased pain with ADLs such as taking care of his grand-daughter.     Patient will continue to benefit from skilled PT services to modify and progress therapeutic interventions, address functional mobility deficits, address ROM deficits, address strength deficits, analyze and address soft tissue restrictions, analyze and cue movement patterns, analyze and modify body mechanics/ergonomics, assess and modify postural abnormalities, address imbalance/dizziness, and instruct in home and community integration to attain remaining goals. [x]  See Plan of Care  [x]  See progress note/recertification  []  See Discharge Summary         Progress towards goals / Updated goals:  Short Term Goals: To be accomplished in 1 weeks:  Goal: Pt will be compliant with initial HEP in order to optimize therapy outcomes   Status at evaluation/last progress note: Initiated at eval  MET     Long Term Goals: To be accomplished in 4 weeks:  Goal: Pt will improve FOTO by 7 points in order to demonstrate functional improvement. Status at evaluation/last progress note: 53/100     2. Goal: Pt will report 50% improvement in symptoms since start of care in order to demonstrate improvement in QOL   Status at evaluation/last progress note: 0% as baseline      3. Goal: Pt will report </= 4 headaches per week in order to improve QOL  Status at evaluation/last progress note: daily headaches   Continues to have daily headaches but less intensity (11/17/22)     4. Goal: Pt will improve left cervical rotation AROM to 55* in order to improve ease of ADL's and checking blind spots while driving. Status at evaluation/last progress note: 32*   Limited progress prior to SNAGs  Left c/s AROM rotation: 29 degrees  Right c/s AROM rotation: 39 degrees (11/17/22)     5. Goal: Pt will report a little difficulty with turning to look behind while driving in order to demonstrate improved ease of driving.    Status at evaluation/last progress note: moderate difficulty reported      PLAN  [x]  Upgrade activities as tolerated     [x]  Continue plan of care  []  Update interventions per flow sheet []  Discharge due to:_  []  Other:_      Fawn Liu, SPTA 11/29/2022  7:48 AM  I was present during the entire treatment, directing and participating in the treatment.    Maldonado Rebolledo, CECELIA       Future Appointments   Date Time Provider Dex [place]   11/29/2022  2:15 PM Nan Pila, PTA [facility]CPTPB [facility]   12/1/2022  1:30 PM Hector MUNIZ, PT [id number] [facility]   12/6/2022  2:30 PM Savage Miller, PT [id number] [facility]   12/8/2022  2:00 PM Princess Jacome, PTA MMCPTPB [facility]   12/13/2022  2:00 PM Nan Pila, PTA MMCPTPB [facility]   12/15/2022  1:30 PM Cortez Hwang, PT [id number] [facility]   12/20/2022  2:30 PM Savage Miller, PT [id number] [facility]   12/22/2022  2:30 PM Nan Pila, PTA MMCPTPB [facility]   12/27/2022  1:40 PM [facility] NURSE [facility]   12/28/2022  1:30 PM Nan Pila, PTA MMCPTPB [facility]   12/30/2022  1:30 PM Nan Pila, PTA MMCPTPB [facility]   1/4/2023  1:15 PM Bakari Chinchilla MD [id number]   1/17/2023  1:30 PM Chris Edmonds MD [facility]   2/6/2023  2:45 PM Chris Edmonds MD [facility]   5/18/2023  1:00 PM Fer Machado PA-C [facility] BS AMB

## 2022-12-01 ENCOUNTER — HOSPITAL ENCOUNTER (OUTPATIENT)
Dept: PHYSICAL THERAPY | Age: 67
Discharge: HOME | End: 2022-12-01
Payer: MEDICARE

## 2022-12-01 PROCEDURE — 97112 NEUROMUSCULAR REEDUCATION: CPT

## 2022-12-01 PROCEDURE — 97110 THERAPEUTIC EXERCISES: CPT

## 2022-12-01 NOTE — PROGRESS NOTES
PT DAILY TREATMENT NOTE     Patient Name: Cecilia Yan  Date:2022  : 1955  [x]  Patient  Verified  Payor: [place] MEDICARE / Plan: [place] MEDICARE PART A & B / Product Type: Medicare /    In time:1:34  Out time:2:26  Total Treatment Time (min): 52  Visit #: 8 of 12    Medicare/BCBS Only   Total Timed Codes (min):  42 1:1 Treatment Time:  42       Treatment Area: Neck pain [M54.2]    SUBJECTIVE  Pain Level (0-10 scale): 3/10 c/s and headache  Any medication changes, allergies to medications, adverse drug reactions, diagnosis change, or new procedure performed?: [x] No    [] Yes (see summary sheet for update)  Subjective functional status/changes:   [] No changes reported  Pt reports that his MD scheduled him for a cortisone injection in a couple weeks. He feels like therapy is helping overall. He continues to report daily headaches but the intensity of headaches is reducing. Pt reports 40% improvement since start of care.      OBJECTIVE    Modality rationale: decrease pain and increase tissue extensibility to improve the patients ability to tolerate daily    Min Type Additional Details    [] Estim:  []Unatt       []IFC  []Premod                        []Other:  []w/ice   []w/heat  Position:  Location:    [] Estim: []Att    []TENS instruct  []NMES                    []Other:  []w/US   []w/ice   []w/heat  Position:  Location:    []  Traction: [] Cervical       []Lumbar                       [] Prone          []Supine                       []Intermittent   []Continuous Lbs:  [] before manual  [] after manual    []  Ultrasound: []Continuous   [] Pulsed                           []1MHz   []3MHz W/cm2:  Location:    []  Iontophoresis with dexamethasone         Location: [] Take home patch   [] In clinic   10 []  Ice     [x]  heat  []  Ice massage  []  Laser   []  Anodyne Position: seated   Location: B UT    []  Laser with stim  []  Other:  Position:  Location:    []  Vasopneumatic Device    []  Right     [] Left  Pre-treatment girth:  Post-treatment girth:  Measured at (location):  Pressure:       [] lo [] med [] hi   Temperature: [] lo [] med [] hi   [x] Skin assessment post-treatment:  [x]intact []redness- no adverse reaction    []redness - adverse reaction:     30 min Therapeutic Exercise:  [] See flow sheet :   Rationale: increase ROM and increase strength to improve the patients ability to improve ease of ADL's    12 min Neuromuscular Re-education:  []  See flow sheet : posture re-ed with 1/2 prone T/W, CNF Tuck + Lift   Rationale: increase strength and increase proprioception  to improve the patients ability to improve posture for reduced pain with daily tasks         With   [] TE   [] TA   [] neuro   [] other: Patient Education: [x] Review HEP    [] Progressed/Changed HEP based on:   [] positioning   [] body mechanics   [] transfers   [] heat/ice application    [] other:      Other Objective/Functional Measures: FOTO: 52/100     Cervical AROM: Left:49*, Right 67*     Able to complete increased reps of [place] row/extension with correct form    Reduced pain/headache following session     Pain Level (0-10 scale) post treatment: 2/10    ASSESSMENT/Changes in Function: See Recert     Patient will continue to benefit from skilled PT services to modify and progress therapeutic interventions, address ROM deficits, address strength deficits, analyze and address soft tissue restrictions, analyze and cue movement patterns, analyze and modify body mechanics/ergonomics, assess and modify postural abnormalities, and instruct in home and community integration to attain remaining goals. []  See Plan of Care  [x]  See progress note/recertification  []  See Discharge Summary         Progress towards goals / Updated goals:  Short Term Goals:  To be accomplished in 1 weeks:  Goal: Pt will be compliant with initial HEP in order to optimize therapy outcomes   Status at evaluation/last progress note: Initiated at al  MET Long Term Goals: To be accomplished in 4 weeks:  Goal: Pt will improve FOTO by 7 points in order to demonstrate functional improvement. Status at evaluation/last progress note: 53/100  Current Status:  Not met. Declined 1 point to 52/100 12/1/22     2. Goal: Pt will report 50% improvement in symptoms since start of care in order to demonstrate improvement in QOL   Status at evaluation/last progress note: 0% as baseline   Progressing. Pt reports 40% improvement since start of care 12/1/22     3. Goal: Pt will report </= 4 headaches per week in order to improve QOL  Status at evaluation/last progress note: daily headaches   Continues to have daily headaches but less intensity 12/1/22     4. Goal: Pt will improve left cervical rotation AROM to 55* in order to improve ease of ADL's and checking blind spots while driving. Status at evaluation/last progress note: 31*   Current Status: Cervical AROM: Left:49*, Right 67* 12/1/22    5. Goal: Pt will report a little difficulty with turning to look behind while driving in order to demonstrate improved ease of driving. Status at evaluation/last progress note: moderate difficulty reported   Current Status: Not met.   Pt reports moderate difficulty 12/1/22      PLAN  [x]  Upgrade activities as tolerated     [x]  Continue plan of care  []  Update interventions per flow sheet       []  Discharge due to:_  []  Other:_      Sirena Mcmullen, PT 12/1/2022  1:37 PM    Future Appointments   Date Time Provider Dex Hatfield   12/6/2022  2:30 PM David Mota, PTA [facility]   12/8/2022  2:00 PM Johanna Mckeon, PTA [facility]   12/13/2022  2:00 PM Catrina Jones PTA [facility]   12/15/2022  1:30 PM Gabriele MUNIZ, PT [id number] [facility]   12/20/2022  2:30 PM Asa Arielle, PT [id number] [facility]   12/22/2022  2:30 PM Catrina Jones PTA [facility]   12/27/2022  [facility]   12/28/2022  1:30 PM Catrina Jones PTA [facility] 12/30/2022  1:30 PM Raul James, PTA [facility]   1/4/2023  1:15 PM Amor, Zeynep Shelley MD [address]   1/17/2023  1:30 PM Kyung Daniel MD [facility] BS AMB   2/6/2023  2:45 PM MD AMY JenkinsMO BS AMB   5/18/2023  1:00 PM Efrem Awad PA-C [facility] AMB

## 2022-12-01 NOTE — THERAPY RECERTIFICATION
[facility]  [address]  [phone] fax    Continued Plan of Care/ Re-certification for Physical Therapy Services    Patient name: Andrez Mondragon Start of Care: 11/3/22   Referral source: Jacqueline Robins MD : 1955   Medical/Treatment Diagnosis: Neck pain [M54.2]  Payor: Rosa M Santo / Plan: VA MEDICARE PART A & B / Product Type: Medicare /  Onset Date: long history with progressive worsening      Prior Hospitalization: see medical history Provider#: [id number]   Medications: Verified on Patient Summary List    Comorbidities: HTN, arthritis, hearing impaired, hx of DVT, hx of prostate cancer, spinal stenosis, hx of B RTC repair     Prior Level of Function: camping, hiking, fishing, retired, [employer] for [employer], [place]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 2.   Goal: Pt will improve FOTO by 7 points in order to demonstrate functional improvement. Status at evaluation/last progress note: 53/100  Current Status:  Not met. Declined 1 point to 52/100      3. Goal: Pt will report 50% improvement in symptoms since start of care in order to demonstrate improvement in QOL   Status at evaluation/last progress note: 0% as baseline   Progressing. Pt reports 40% improvement since start of care      4. Goal: Pt will report </= 4 headaches per week in order to improve QOL  Status at evaluation/last progress note: daily headaches   Continues to have daily headaches but less intensity      5.    Goal: Pt will improve left cervical rotation AROM to 55* in order to improve ease of ADL's and checking blind spots while driving. Status at evaluation/last progress note: 31*   Current Status: Goal met. Cervical AROM: Left:49*, Right 67*      6. Goal: Pt will report a little difficulty with turning to look behind while driving in order to demonstrate improved ease of driving. Status at evaluation/last progress note: moderate difficulty reported   Current Status: Not met. Pt reports moderate difficulty 1     Key functional changes: decreasing intensity of headaches, improving cervical mobility, subjective reports of 40% improvement       Problems/ barriers to goal attainment: likely degenerative and postural changes      Problem List: pain affecting function, decrease ROM, decrease strength, decrease ADL/ functional abilitiies, decrease activity tolerance, and decrease flexibility/ joint mobility    Treatment Plan: Therapeutic exercise, Neuromuscular reeducation, Manual therapy, Therapeutic activity, Self care/home management, Electric stim unattended , Gait training, Mechanical traction, and Electric stim attended     Patient Goal (s) has been updated and includes: less pain, less headaches      Goals for this certification period to be accomplished in 4 weeks:  Goal: Pt will improve FOTO by 7 points in order to demonstrate functional improvement. Status at evaluation/last progress note: Not met. Declined 1 point to 52/100      2. Goal: Pt will report 50% improvement in symptoms since start of care in order to demonstrate improvement in QOL   Status at evaluation/last progress note: Pt reports 40% improvement since start of care      3. Goal: Pt will report </= 4 headaches per week in order to improve QOL  Status at evaluation/last progress note: Continues to have daily headaches but less intensity      4. Goal: Pt will improve left cervical rotation AROM to 55* in order to improve ease of ADL's and checking blind spots while driving.    Status at evaluation/last progress note: Progressing. Cervical AROM: Left:49*, Right 67*      5. Goal: Pt will report a little difficulty with turning to look behind while driving in order to demonstrate improved ease of driving. Status at evaluation/last progress note: Pt reports moderate difficulty     Frequency / Duration: Patient to be seen 2-3 times per week for 4 weeks:    Assessment / Recommendations:Pt is making slow, steady progress in therapy, meeting 1/5 initial goals and progressing towards % improvement, headache, and cervical ROM goals. FOTO score has declined 1 point since initial evaluation, and subjective reports of ability with looking behind while driving has not changed despite improvement in cervical AROM. Pt continues to complain of daily headaches; however, intensity of headaches is reducing. Continued forward posture and likely degenerative changes are likely contributing to continued symptoms. Pt reports 40% overall improvement since start of care. Pt will benefit from continued skilled PT to address remaining strength, ROM, flexibility, and postural deficits in order to reduce pain with daily tasks and improve QOL. Certification Period: 12/2/22 to 12/31/22    Danita Almeida, PT 12/1/2022 2:01 PM    ________________________________________________________________________  I certify that the above Therapy Services are being furnished while the patient is under my care. I agree with the treatment plan and certify that this therapy is necessary. [] I have read the above and request that my patient continue as recommended.   [] I have read the above report and request that my patient continue therapy with the following changes/special instructions: _______________________________________  [] I have read the above report and request that my patient be discharged from therapy    Physician's Signature:____________Date:_________TIME:________     Joanna Doan MD  ** Signature, Date and Time must be completed for valid certification **    Please sign and return to [facility]  [address] [place] [address]  [phone] fax

## 2022-12-06 ENCOUNTER — HOSPITAL ENCOUNTER (OUTPATIENT)
Dept: PHYSICAL THERAPY | Age: 67
Discharge: HOME | End: 2022-12-06
Payer: MEDICARE

## 2022-12-06 PROCEDURE — 97112 NEUROMUSCULAR REEDUCATION: CPT

## 2022-12-06 PROCEDURE — 97110 THERAPEUTIC EXERCISES: CPT

## 2022-12-06 NOTE — LETTER
11/6/2020 9:29 AM 
 
Mr. Jason Caro [id number] [facility] [id number] To Whom It May Concern: 
 
Jason Caro is currently under the care of [address] [place] [address]. He will remain out of work until 11/16/2020. If there are questions or concerns please have the patient contact our office.  
 
 
 
 
 
Sincerely, 
 
 
Stephanie Feldman MD 
 
 Improved

## 2022-12-06 NOTE — PROGRESS NOTES
PT DAILY TREATMENT NOTE     Patient Name: Andrez Mondragon  Date:2022  : 1955  [x]  Patient  Verified  Payor: VA MEDICARE / Plan: VA MEDICARE PART A & B / Product Type: Medicare /    In time: 2:32  Out time: 3:12  Total Treatment Time (min): 40  Visit #: 1 of 12    Medicare/BCBS Only   Total Timed Codes (min):  30 1:1 Treatment Time:  30       Treatment Area: Neck pain [M54.2]    SUBJECTIVE  Pain Level (0-10 scale): 3/10 c/s with HA  Any medication changes, allergies to medications, adverse drug reactions, diagnosis change, or new procedure performed?: [x] No    [] Yes (see summary sheet for update)  Subjective functional status/changes:   [] No changes reported  Pt reports some days he has a HA and some not. He is pretty much the same, the doctor's are telling him the HA are coming from his neck. Yesterday was good until around 4pm then he got a bad HA and it went away by bed time and he woke up with another one this AM and again now. He knows his posture is a main focus.      OBJECTIVE    Modality rationale: decrease pain and increase tissue extensibility to improve the patients ability to tolerate daily    Min Type Additional Details    [] Estim:  []Unatt       []IFC  []Premod                        []Other:  []w/ice   []w/heat  Position:  Location:    [] Estim: []Att    []TENS instruct  []NMES                    []Other:  []w/US   []w/ice   []w/heat  Position:  Location:    []  Traction: [] Cervical       []Lumbar                       [] Prone          []Supine                       []Intermittent   []Continuous Lbs:  [] before manual  [] after manual    []  Ultrasound: []Continuous   [] Pulsed                           []1MHz   []3MHz W/cm2:  Location:    []  Iontophoresis with dexamethasone         Location: [] Take home patch   [] In clinic   10 []  Ice     [x]  heat  []  Ice massage  []  Laser   []  Anodyne Position: seated   Location: B UT    []  Laser with stim  []  Other: Position:  Location:    []  Vasopneumatic Device    []  Right     []  Left  Pre-treatment girth:  Post-treatment girth:  Measured at (location):  Pressure:       [] lo [] med [] hi   Temperature: [] lo [] med [] hi   [x] Skin assessment post-treatment:  [x]intact []redness- no adverse reaction    []redness - adverse reaction:     20 min Therapeutic Exercise:  [] See flow sheet :   Rationale: increase ROM and increase strength to improve the patients ability to improve ease of ADL's    10 min Neuromuscular Re-education:  []  See flow sheet : posture re-ed with 1/2 prone T/W, DNF Tuck + Lift   Rationale: increase strength and increase proprioception  to improve the patients ability to improve posture for reduced pain with daily tasks       With   [] TE   [] TA   [] neuro   [] other: Patient Education: [x] Review HEP    [] Progressed/Changed HEP based on:   [] positioning   [] body mechanics   [] transfers   [] heat/ice application    [] other:      Other Objective/Functional Measures:   - challenged with DNF with lift  - mod cuing for form with KZ activities  - initial cuing for form with 1/2 prone letters    Pain Level (0-10 scale) post treatment: 2-3/10    ASSESSMENT/Changes in Function:   Patient is progressing slowly towards goals with continued daily headaches but less intense. He is progressing with awareness of posture and requires mod cuing throughout session. Pt tolerated light progression of reps as noted on FS with minor increase in fatigue. He continues to report more difficulty turning to the left than the right.      Patient will continue to benefit from skilled PT services to modify and progress therapeutic interventions, address ROM deficits, address strength deficits, analyze and address soft tissue restrictions, analyze and cue movement patterns, analyze and modify body mechanics/ergonomics, assess and modify postural abnormalities, and instruct in home and community integration to attain remaining goals.     []  See Plan of Care  [x]  See progress note/recertification  []  See Discharge Summary         Goals for this certification period to be accomplished in 4 weeks:  Goal: Pt will improve FOTO by 7 points in order to demonstrate functional improvement. Status at evaluation/last progress note: Not met. Declined 1 point to 52/100      2. Goal: Pt will report 50% improvement in symptoms since start of care in order to demonstrate improvement in QOL   Status at evaluation/last progress note: Pt reports 40% improvement since start of care      3. Goal: Pt will report </= 4 headaches per week in order to improve QOL  Status at evaluation/last progress note: Continues to have daily headaches but less intensity      4. Goal: Pt will improve left cervical rotation AROM to 55* in order to improve ease of ADL's and checking blind spots while driving. Status at evaluation/last progress note: Progressing. Cervical AROM: Left:49*, Right 67*      5. Goal: Pt will report a little difficulty with turning to look behind while driving in order to demonstrate improved ease of driving.    Status at evaluation/last progress note: Pt reports moderate difficulty     PLAN  [x]  Upgrade activities as tolerated     [x]  Continue plan of care  []  Update interventions per flow sheet       []  Discharge due to:_  []  Other:_      Rupert Farooq, PTA 12/6/2022  3:12 PM    Future Appointments   Date Time Provider Dex Hatfield   12/6/2022  2:30 PM Davida Choe, PTA [facility]   12/8/2022  2:00 PM Jak Donaldson PTA [facility]   12/12/2022  2:30 PM Ramsey Sanchez, PTA [facility]   12/15/2022  1:30 PM Mónica Corral, PT ELIZABETH [facility]   12/20/2022  2:30 PM Mónica Corral, PT BENY [facility]   12/22/2022  2:30 PM Ramsey Sanchez PTA [facility]   12/27/2022  1:40 PM [facility] NURSE [facility] ROXANNE SILVA   12/28/2022  1:30 PM Ramsey Sanchez PTA [facility]   12/30/2022  1:30 PM Ramsey Sanchez PTA [id number]   1/4/2023  1:15 PM Given, Randall Taylor MD [address]   1/17/2023  1:30 PM Siobhan Dalton MD [facility] BS AMB   2/6/2023  2:45 PM Siobhan Dalton MD [facility] BS AMB   5/18/2023  1:00 PM Andressa Carvajal PA-C [facility] BS AMB

## 2022-12-08 ENCOUNTER — HOSPITAL ENCOUNTER (OUTPATIENT)
Dept: PHYSICAL THERAPY | Age: 67
Discharge: HOME | End: 2022-12-08
Payer: MEDICARE

## 2022-12-08 PROCEDURE — 97110 THERAPEUTIC EXERCISES: CPT

## 2022-12-08 PROCEDURE — 97112 NEUROMUSCULAR REEDUCATION: CPT

## 2022-12-08 NOTE — PROGRESS NOTES
PT DAILY TREATMENT NOTE     Patient Name: Brandy Muhammad  Date:2022  : 1955  [x]  Patient  Verified  Payor: [place] MEDICARE / Plan: [place] MEDICARE PART A & B / Product Type: Medicare /    In time:200  Out time:238  Total Treatment Time (min): 38  Visit #: 2 of 12    Medicare/BCBS Only   Total Timed Codes (min):  28 1:1 Treatment Time:  28       Treatment Area: Neck pain [M54.2]    SUBJECTIVE  Pain Level (0-10 scale): 3/10  Any medication changes, allergies to medications, adverse drug reactions, diagnosis change, or new procedure performed?: [x] No    [] Yes (see summary sheet for update)  Subjective functional status/changes:   [] No changes reported  Pt stated that he has a HA today, but it is not as bad as yesterday.  Cont with neck pain    OBJECTIVE    Modality rationale: decrease pain and increase tissue extensibility to improve the patients ability to tolerate daily     Min Type Additional Details     [] Estim:  []Unatt       []IFC  []Premod                        []Other:  []w/ice   []w/heat  Position:  Location:     [] Estim: []Att    []TENS instruct  []NMES                    []Other:  []w/US   []w/ice   []w/heat  Position:  Location:     []  Traction: [] Cervical       []Lumbar                       [] Prone          []Supine                       []Intermittent   []Continuous Lbs:  [] before manual  [] after manual     []  Ultrasound: []Continuous   [] Pulsed                           []1MHz   []3MHz W/cm2:  Location:     []  Iontophoresis with dexamethasone         Location: [] Take home patch   [] In clinic   10 []  Ice     [x]  heat  []  Ice massage  []  Laser   []  Anodyne Position: seated   Location: B UT     []  Laser with stim  []  Other:  Position:  Location:     []  Vasopneumatic Device    []  Right     []  Left  Pre-treatment girth:  Post-treatment girth:  Measured at (location):  Pressure:       [] lo [] med [] hi   Temperature: [] lo [] med [] hi   [x] Skin assessment post-treatment:  [x]intact []redness- no adverse reaction    []redness - adverse reaction:      18 min Therapeutic Exercise:  [x] See flow sheet :   Rationale: increase ROM and increase strength to improve the patients ability to improve ease of ADL's     10 min Neuromuscular Re-education:  [x]  See flow sheet : posture re-ed with 1/2 prone T/W, DNF Tuck + Lift   Rationale: increase strength and increase proprioception  to improve the patients ability to improve posture for reduced pain with daily tasks           With   [x] TE   [] TA   [] neuro   [] other: Patient Education: [x] Review HEP    [] Progressed/Changed HEP based on:   [] positioning   [] body mechanics   [] transfers   [] heat/ice application    [] other:      Other Objective/Functional Measures:   Reported discomfort with 1/2 prone W's  No other complaint of increased pain during session   Cont with decreased range with left open books     Pain Level (0-10 scale) post treatment: 2/10    ASSESSMENT/Changes in Function:   Pt is making slow progress toward goals. Pt cont with HA's, but reports decreased intensity and frequency. Cervical AROM is slowly improving for all motions. Pt cont to report difficulty with turning head while driving. Cont to report having some difficulty with performing household tasks    Patient will continue to benefit from skilled PT services to modify and progress therapeutic interventions, address functional mobility deficits, address ROM deficits, address strength deficits, analyze and cue movement patterns, analyze and modify body mechanics/ergonomics, assess and modify postural abnormalities, and instruct in home and community integration to attain remaining goals. []  See Plan of Care  [x]  See progress note/recertification  []  See Discharge Summary         Progress towards goals / Updated goals:  Goal: Pt will improve FOTO by 7 points in order to demonstrate functional improvement.    Status at evaluation/last progress note: Not met. Declined 1 point to 52/100      2. Goal: Pt will report 50% improvement in symptoms since start of care in order to demonstrate improvement in QOL   Status at evaluation/last progress note: Pt reports 40% improvement since start of care      3. Goal: Pt will report </= 4 headaches per week in order to improve QOL  Status at evaluation/last progress note: Continues to have daily headaches but less intensity      4. Goal: Pt will improve left cervical rotation AROM to 55* in order to improve ease of ADL's and checking blind spots while driving. Status at evaluation/last progress note: Progressing. Cervical AROM: Left:49*, Right 67*      5. Goal: Pt will report a little difficulty with turning to look behind while driving in order to demonstrate improved ease of driving.    Status at evaluation/last progress note: Pt reports moderate difficulty     PLAN  []  Upgrade activities as tolerated     [x]  Continue plan of care  []  Update interventions per flow sheet       []  Discharge due to:_  []  Other:_      Hailey Adames PTA 12/8/2022  6:26 AM    Future Appointments   Date Time Provider Dex Hatfield   12/8/2022  2:00 PM Kamla Francis [place] [facility]   12/12/2022  2:30 PM Maryann Haynes, PTA [facility]   12/15/2022  1:30 PM Shawn Acosta, PT [id number]   12/20/2022  2:30 PM Shawn Acosta PT [id number]   12/22/2022  2:30 PM Jacsarahalin Benders, PTA [facility]   12/27/2022  1:40 PM [facility] NURSE [facility] ROXANNE SILVA   12/28/2022  1:30 PM Maryann Haynes, PTA [facility]   12/30/2022  1:30 PM Jacqualin Bendelizabeth, PTA [facility]   1/4/2023  1:15 PM Dimas Chinchilla MD [facility] [facility]   1/17/2023  1:30 PM Christin Stiles MD [facility] BS AMB   2/6/2023  2:45 PM Christin Stiles MD [facility] BS AMB   5/18/2023  1:00 PM Josef Schroeder PA-C [facility] BS AMB

## 2022-12-12 ENCOUNTER — HOSPITAL ENCOUNTER (OUTPATIENT)
Dept: PHYSICAL THERAPY | Age: 67
Discharge: HOME | End: 2022-12-12
Payer: MEDICARE

## 2022-12-12 PROCEDURE — 97110 THERAPEUTIC EXERCISES: CPT

## 2022-12-12 PROCEDURE — 97112 NEUROMUSCULAR REEDUCATION: CPT

## 2022-12-12 NOTE — PROGRESS NOTES
PT DAILY TREATMENT NOTE     Patient Name: Tony Conner  JIBS:  : 1955  [x]  Patient  Verified  Payor: [place] MEDICARE / Plan: VA MEDICARE PART A & B / Product Type: Medicare /    In time:2:34  Out time:3:26  Total Treatment Time (min): 52  Visit #: 3 of 12    Medicare/BCBS Only   Total Timed Codes (min):  42 1:1 Treatment Time:  42       Treatment Area: Neck pain [M54.2]    SUBJECTIVE  Pain Level (0-10 scale): 3/10 neck/shoulder, 4/10 headache   Any medication changes, allergies to medications, adverse drug reactions, diagnosis change, or new procedure performed?: [x] No    [] Yes (see summary sheet for update)  Subjective functional status/changes:   [] No changes reported  Pt report that his headache is a little worse today. He's not sure if his headache is worse because of the colder weather. He did not do his HEP as much over the weekend because he was busy and because his headache was bothering him. He is scheduled for a cortisone injection tomorrow.       OBJECTIVE    Modality rationale: decrease pain and increase tissue extensibility to improve the patients ability to tolerate daily tasks    Min Type Additional Details    [] Estim:  []Unatt       []IFC  []Premod                        []Other:  []w/ice   []w/heat  Position:  Location:    [] Estim: []Att    []TENS instruct  []NMES                    []Other:  []w/US   []w/ice   []w/heat  Position:  Location:    []  Traction: [] Cervical       []Lumbar                       [] Prone          []Supine                       []Intermittent   []Continuous Lbs:  [] before manual  [] after manual    []  Ultrasound: []Continuous   [] Pulsed                           []1MHz   []3MHz W/cm2:  Location:    []  Iontophoresis with dexamethasone         Location: [] Take home patch   [] In clinic   10 []  Ice     [x]  heat  []  Ice massage  []  Laser   []  Anodyne Position: seated  Location: B UT     []  Laser with stim  []  Other: Position:  Location:    []  Vasopneumatic Device    []  Right     []  Left  Pre-treatment girth:  Post-treatment girth:  Measured at (location):  Pressure:       [] lo [] med [] hi   Temperature: [] lo [] med [] hi   [x] Skin assessment post-treatment:  [x]intact []redness- no adverse reaction    []redness - adverse reaction:       34 min Therapeutic Exercise:  [x] See flow sheet :   Rationale: increase ROM and increase strength to improve the patients ability to improve ease of ADL's and household management     8 min Neuromuscular Re-education:  [x]  See flow sheet : scapula re-ed with Lititz depression and wall push up plus   Rationale: increase strength and increase proprioception  to improve the patients ability to improve posture for reduced pain and increased ease with daily tasks        With   [] TE   [] TA   [] neuro   [] other: Patient Education: [x] Review HEP    [] Progressed/Changed HEP based on:   [] positioning   [] body mechanics   [] transfers   [] heat/ice application    [] other:      Other Objective/Functional Measures:     Subjective information obtained during bike  /79, heart rate 80bpm taken electronically following UBE when pt requested that therapist check BP     Required tactile cues to achieve correct form with Luis Miguel depression  Challenged with initiation of wall push up plus, required cues to avoid shoulder shrug, improved form with cuing    Premature and increased upward rotation of left scapula with shoulder elevation, decreased eccentric control upon lowering    Reduced pain/headache reported following session      Pain Level (0-10 scale) post treatment: 2/10 neck/shoulder, 3/10 headache    ASSESSMENT/Changes in Function:     Pt is making slow, steady progress towards updated goals in therapy. He continues to report daily headaches but intensity of headaches is slowly improving. Pt is scheduled for a cortisone injection tomorrow.   Will continue to address strength, ROM, flexibility, and postural deficits to reduce pain with daily tasks and improve QOL. Patient will continue to benefit from skilled PT services to modify and progress therapeutic interventions, address ROM deficits, address strength deficits, analyze and address soft tissue restrictions, analyze and cue movement patterns, analyze and modify body mechanics/ergonomics, assess and modify postural abnormalities, and instruct in home and community integration to attain remaining goals. Progress towards goals / Updated goals:  Goal: Pt will improve FOTO by 7 points in order to demonstrate functional improvement. Status at evaluation/last progress note: Not met. Declined 1 point to 52/100      2. Goal: Pt will report 50% improvement in symptoms since start of care in order to demonstrate improvement in QOL   Status at evaluation/last progress note: Pt reports 40% improvement since start of care      3. Goal: Pt will report </= 4 headaches per week in order to improve QOL  Status at evaluation/last progress note: Continues to have daily headaches but less intensity   Current Status: No change 12/12/22     4. Goal: Pt will improve left cervical rotation AROM to 55* in order to improve ease of ADL's and checking blind spots while driving. Status at evaluation/last progress note: Progressing. Cervical AROM: Left:49*, Right 67*      5. Goal: Pt will report a little difficulty with turning to look behind while driving in order to demonstrate improved ease of driving.    Status at evaluation/last progress note: Pt reports moderate difficulty        PLAN  [x]  Upgrade activities as tolerated     [x]  Continue plan of care  []  Update interventions per flow sheet       []  Discharge due to:_  []  Other:_      Naldo Ramirez PT 12/12/2022  2:36 PM    Future Appointments   Date Time Provider Dex Hatfield   12/15/2022  1:30 PM Mónica Corral, GALILEO [facility] [address]   12/20/2022  2:30 PM Mónica Corral PT [facility] [address] 12/22/2022  2:30 PM Margot Mejia, BIRGIT [facility]   12/27/2022  1:40 PM [facility] NURSE [facility] ROXANNE [facility]   12/28/2022  1:30 PM Margot Mejia, BIRGIT [facility]   12/30/2022  1:30 PM Margot Mejia PTA [facility]   1/4/2023  1:15 PM Given, Marval Holter, MD [address] [facility]   1/17/2023  1:30 PM Addison Lentz MD [facility] BS AMB   2/6/2023  2:45 PM Addison Lentz MD [facility] BS AMB   5/18/2023  1:00 PM Devante Chandler PA-C [facility] BS AMB

## 2022-12-13 ENCOUNTER — APPOINTMENT (OUTPATIENT)
Dept: PHYSICAL THERAPY | Age: 67
End: 2022-12-13
Payer: MEDICARE

## 2022-12-15 ENCOUNTER — HOSPITAL ENCOUNTER (OUTPATIENT)
Dept: PHYSICAL THERAPY | Age: 67
Discharge: HOME | End: 2022-12-15
Payer: MEDICARE

## 2022-12-15 PROCEDURE — 97112 NEUROMUSCULAR REEDUCATION: CPT

## 2022-12-15 PROCEDURE — 97110 THERAPEUTIC EXERCISES: CPT

## 2022-12-15 NOTE — PROGRESS NOTES
PT DAILY TREATMENT NOTE     Patient Name: Kimberly Alcocer  UAEL:  : 1955  [x]  Patient  Verified  Payor: [place] MEDICARE / Plan: [place] MEDICARE PART A & B / Product Type: Medicare /    In time:1:34  Out time:2:28  Total Treatment Time (min): 54  Visit #: 4 of 12    Medicare/BCBS Only   Total Timed Codes (min):  44 1:1 Treatment Time:  42       Treatment Area: Neck pain [M54.2]    SUBJECTIVE  Pain Level (0-10 scale): 3/10 neck, 3/10 headache   Any medication changes, allergies to medications, adverse drug reactions, diagnosis change, or new procedure performed?: [x] No    [] Yes (see summary sheet for update)  Subjective functional status/changes:   [] No changes reported  Pt reports that he had a cortisone injection on Tuesday. He followed up with Dr. Mee Saucedo again on Tuesday. His face was flushed yesterday, which his MD told him was a possible side effect of the cortisone injection. He also had a ~5 second episode of significant fatigue yesterday while shopping. Sensation subsided after ~5 seconds, and pt denied any other abnormal symptoms. He has had a few episodes like that over the years.       OBJECTIVE    Modality rationale: decrease pain and increase tissue extensibility to improve the patients ability to tolerate daily tasks   Min Type Additional Details    [] Estim:  []Unatt       []IFC  []Premod                        []Other:  []w/ice   []w/heat  Position:  Location:    [] Estim: []Att    []TENS instruct  []NMES                    []Other:  []w/US   []w/ice   []w/heat  Position:  Location:    []  Traction: [] Cervical       []Lumbar                       [] Prone          []Supine                       []Intermittent   []Continuous Lbs:  [] before manual  [] after manual    []  Ultrasound: []Continuous   [] Pulsed                           []1MHz   []3MHz W/cm2:  Location:    []  Iontophoresis with dexamethasone         Location: [] Take home patch   [] In clinic   10 []  Ice     [x] heat  []  Ice massage  []  Laser   []  Anodyne Position: seated  Location: B UT     []  Laser with stim  []  Other:  Position:  Location:    []  Vasopneumatic Device    []  Right     []  Left  Pre-treatment girth:  Post-treatment girth:  Measured at (location):  Pressure:       [] lo [] med [] hi   Temperature: [] lo [] med [] hi   [x] Skin assessment post-treatment:  [x]intact []redness- no adverse reaction    []redness - adverse reaction:       32 min Therapeutic Exercise:  [x] See flow sheet :   Rationale: increase ROM, increase strength, and increase proprioception to improve the patients ability to improve ease of ADL's and daily tasks    12 min Neuromuscular Re-education:  []  See flow sheet : scapular re-ed with Luis Miguel depression, wall push up plus, and DNF    Rationale: increase strength and increase proprioception  to improve the patients ability to reduce pain and improve ease of ADL's and daily tasks       With   [] TE   [] TA   [] neuro   [] other: Patient Education: [x] Review HEP    [] Progressed/Changed HEP based on:   [] positioning   [] body mechanics   [] transfers   [] heat/ice application    [x] other: instructed pt to notify of MD regarding brief episode of extreme fatigue. Other Objective/Functional Measures:     Discomfort in right bicep with final reps of Luis Miguel horizontal abduction, discomfort subsided immediately upon lowering     Required tactile cues to avoid shoulder shrug and achieve scapular retraction/depression with Luis Miguel depression, correct form with tactile cuing    Increased ability with DNF tuck + lift this visit, able to perform without symptom increased  Reduced symptoms following session      Pain Level (0-10 scale) post treatment: 2/10 shoulder, 1/10 headache     ASSESSMENT/Changes in Function:     Pt is making slow, steady progress towards updated goals in therapy. Strength is slowly improving, evidenced by improving ease/ability with therapy interventions.   Will continue to address strength, ROM, flexibility, and postural deficits in order to reduce headaches/pain with daily tasks and improve QOL. Patient will continue to benefit from skilled PT services to modify and progress therapeutic interventions, address ROM deficits, address strength deficits, analyze and address soft tissue restrictions, analyze and cue movement patterns, analyze and modify body mechanics/ergonomics, assess and modify postural abnormalities, and instruct in home and community integration to attain remaining goals. Progress towards goals / Updated goals:  Goal: Pt will improve FOTO by 7 points in order to demonstrate functional improvement. Status at evaluation/last progress note: Not met. Declined 1 point to 52/100      2. Goal: Pt will report 50% improvement in symptoms since start of care in order to demonstrate improvement in QOL   Status at evaluation/last progress note: Pt reports 40% improvement since start of care      3. Goal: Pt will report </= 4 headaches per week in order to improve QOL  Status at evaluation/last progress note: Continues to have daily headaches but less intensity   Current Status: No change 12/12/22     4. Goal: Pt will improve left cervical rotation AROM to 55* in order to improve ease of ADL's and checking blind spots while driving. Status at evaluation/last progress note: Progressing. Cervical AROM: Left:49*, Right 67*      5. Goal: Pt will report a little difficulty with turning to look behind while driving in order to demonstrate improved ease of driving.    Status at evaluation/last progress note: Pt reports moderate difficulty      PLAN  [x]  Upgrade activities as tolerated     [x]  Continue plan of care  []  Update interventions per flow sheet       []  Discharge due to:_  []  Other:_      January Ruiz, PT 12/15/2022  1:40 PM    Future Appointments   Date Time Provider Dex Hatfield   12/20/2022  2:30 PM Neil Hamilton, PT [facility] 12/22/2022  2:30 PM Eamon Hernandez, PTA [facility]   12/27/2022  1:40 PM [facility] NURSE [facility] ROXANNE [facility]   12/28/2022  1:30 PM Anila [place], PTA [facility]   12/30/2022  1:30 PM Anila [place], PTA [facility]   1/4/2023  1:15 PM Allison Chinchilla MD [address] [facility]   1/17/2023  1:30 PM Frantz Pastor MD [facility] BS AMB   2/6/2023  2:45 PM Frantz Pastor MD [facility] BS AMB   5/18/2023  1:00 PM Praveen Marsh PA-C [facility] BS AMB

## 2022-12-20 ENCOUNTER — HOSPITAL ENCOUNTER (OUTPATIENT)
Dept: PHYSICAL THERAPY | Age: 67
Discharge: HOME | End: 2022-12-20
Payer: MEDICARE

## 2022-12-20 PROCEDURE — 97140 MANUAL THERAPY 1/> REGIONS: CPT

## 2022-12-20 PROCEDURE — 97110 THERAPEUTIC EXERCISES: CPT

## 2022-12-20 NOTE — PROGRESS NOTES
PT DAILY TREATMENT NOTE     Patient Name: Carla Simon  Date:2022  : 1955  [x]  Patient  Verified  Payor: [place] MEDICARE / Plan: [place] MEDICARE PART A & B / Product Type: Medicare /    In time: 11:00 Out time: 11:55  Total Treatment Time (min):55  Visit #: 5 of 12    Medicare/BCBS Only   Total Timed Codes (min): 45 1:1 Treatment Time: 45       Treatment Area: Neck pain [M54.2]    SUBJECTIVE  Pain Level (0-10 scale): 3/10 neck and headache  Any medication changes, allergies to medications, adverse drug reactions, diagnosis change, or new procedure performed?: [x] No    [] Yes (see summary sheet for update)  Subjective functional status/changes:   [] No changes reported  Pt reports a headache since this morning waking up and took some medicine to try to calm it down. He thinks he goes back to the MD around  and will likely need maybe one more injection. He states overall improvement and trying to be more aware of his posture. He states he sleeps semi reclined and sometimes his head will fall to one side. He really can't sleep on his side due to his past shoulder surgeries.      OBJECTIVE    Modality rationale: decrease pain and increase tissue extensibility to improve the patients ability to tolerate daily tasks   Min Type Additional Details    [] Estim:  []Unatt       []IFC  []Premod                        []Other:  []w/ice   []w/heat  Position:  Location:    [] Estim: []Att    []TENS instruct  []NMES                    []Other:  []w/US   []w/ice   []w/heat  Position:  Location:    []  Traction: [] Cervical       []Lumbar                       [] Prone          []Supine                       []Intermittent   []Continuous Lbs:  [] before manual  [] after manual    []  Ultrasound: []Continuous   [] Pulsed                           []1MHz   []3MHz W/cm2:  Location:    []  Iontophoresis with dexamethasone         Location: [] Take home patch   [] In clinic   10 []  Ice     [x]  heat  []  Ice massage  []  Laser   []  Anodyne Position: supine  Location[de-identified] c/s     []  Laser with stim  []  Other:  Position:  Location:    []  Vasopneumatic Device    []  Right     []  Left  Pre-treatment girth:  Post-treatment girth:  Measured at (location):  Pressure:       [] lo [] med [] hi   Temperature: [] lo [] med [] hi   [x] Skin assessment post-treatment:  [x]intact []redness- no adverse reaction    []redness - adverse reaction:       30 min Therapeutic Exercise:  [x] See flow sheet :   Rationale: increase ROM, increase strength, and increase proprioception to improve the patients ability to improve ease of ADLs and daily tasks    15 min Manual Therapy  [x]  See flow sheet : grade III-IV PA mobs to the lower c/s and upper t/s performed at rest with pt in supine and while performing c/s retractions for overpressure    SOR; TPR posterior scalenes, splenius capitus   Rationale: increase strength and increase proprioception  to improve the patients ability to reduce pain and improve ease of ADLs and daily tasks       With   [] TE   [] TA   [] neuro   [] other: Patient Education: [x] Review HEP    [] Progressed/Changed HEP based on:   [] positioning   [] body mechanics   [] transfers   [] heat/ice application    [] other:     Other Objective/Functional Measures:   Continues with hypertonic levator scapulae due to forward head posture  Reviewed performing open books for t/s mobility  Decreased lower c/s upper t/s PA mobility  Suboccipital tightness felt performing c/s retraction in supine but resolved post SOR  In sitting post manual pt able to perform c/s retraction with improved AROM and with reduced tension felt on levator scapulae    Pain Level (0-10 scale) post treatment: 0/10 headache; 1/10 neck    ASSESSMENT/Changes in Function: Pt continues to need to work on mobility of the lower c/s and upper t/s to improve c/s retraction mobility and to improved extension endurance.  His forward head contributes to increased tension and sustained activation of the levator scapulae and c/s paraspinals causing cervicogenic headaches. Will continue to progress postural endurance for decreased pain and decreased headache frequency. Patient will continue to benefit from skilled PT services to modify and progress therapeutic interventions, address ROM deficits, address strength deficits, analyze and address soft tissue restrictions, analyze and cue movement patterns, analyze and modify body mechanics/ergonomics, assess and modify postural abnormalities, and instruct in home and community integration to attain remaining goals. Progress towards goals / Updated goals:  Goal: Pt will improve FOTO by 7 points in order to demonstrate functional improvement. Status at evaluation/last progress note: Not met. Declined 1 point to 52/100      2. Goal: Pt will report 50% improvement in symptoms since start of care in order to demonstrate improvement in QOL   Status at evaluation/last progress note: Pt reports 40% improvement since start of care      3. Goal: Pt will report </= 4 headaches per week in order to improve QOL  Status at evaluation/last progress note: Continues to have daily headaches but less intensity   Current Status: No change 12/12/22     4. Goal: Pt will improve left cervical rotation AROM to 55* in order to improve ease of ADL's and checking blind spots while driving. Status at evaluation/last progress note: Progressing. Cervical AROM: Left:49*, Right 67*      5. Goal: Pt will report a little difficulty with turning to look behind while driving in order to demonstrate improved ease of driving.    Status at evaluation/last progress note: Pt reports moderate difficulty      PLAN  [x]  Upgrade activities as tolerated     [x]  Continue plan of care  []  Update interventions per flow sheet       []  Discharge due to:_  []  Other:_      Bret Arevalo, BIRGIT 12/20/2022  1:40 PM    Future Appointments   Date Time Provider Dex Alysia   12/20/2022 11:00 AM Stafford Kayser, BIRGIT [facility]   12/22/2022  2:30 PM Radha Ernandez PTA [facility]   12/27/2022  1:40 PM [facility]   12/28/2022  1:30 PM Stafford Kayser, BIRGIT [facility]   12/30/2022  1:30 PM Stafford Kayser, PTA [facility]   1/4/2023  1:15 PM Amor, Cornelio Cushing, MD [address]   1/17/2023  1:30 PM Zay Waters MD [facility] BS AMB   2/6/2023  2:45 PM Zay Waters MD [facility] BS AMB   5/18/2023  1:00 PM Eze Zhang PA-C [facility] BS AMB

## 2022-12-21 DIAGNOSIS — M62.838 MUSCLE SPASM: ICD-10-CM

## 2022-12-21 DIAGNOSIS — M79.18 MYOFASCIAL PAIN: ICD-10-CM

## 2022-12-21 RX ORDER — METAXALONE 800 MG/1
TABLET ORAL
Qty: 90 TABLET | Refills: 3 | Status: SHIPPED | OUTPATIENT
Start: 2022-12-21

## 2022-12-22 ENCOUNTER — HOSPITAL ENCOUNTER (OUTPATIENT)
Dept: PHYSICAL THERAPY | Age: 67
Discharge: HOME | End: 2022-12-22
Payer: MEDICARE

## 2022-12-22 PROCEDURE — 97110 THERAPEUTIC EXERCISES: CPT

## 2022-12-22 PROCEDURE — 97140 MANUAL THERAPY 1/> REGIONS: CPT

## 2022-12-22 NOTE — PROGRESS NOTES
PT DAILY TREATMENT NOTE     Patient Name: Blanca Rivera  EUAV:  : 1955  [x]  Patient  Verified  Payor: VA MEDICARE / Plan: VA MEDICARE PART A & B / Product Type: Medicare /    In time: 2:31   Out time: 3:12  Total Treatment Time (min): 41  Visit #: 6 of 12    Medicare/BCBS Only   Total Timed Codes (min): 31 1:1 Treatment Time: 31       Treatment Area: Neck pain [M54.2]    SUBJECTIVE  Pain Level (0-10 scale): 3/10 neck and headache  Any medication changes, allergies to medications, adverse drug reactions, diagnosis change, or new procedure performed?: [x] No    [] Yes (see summary sheet for update)  Subjective functional status/changes:   [] No changes reported  Pt reports he was not too bad yesterday but late last night he started getting worse and even more so this morning. When he first wakes up it always the worse and gets better as he gets up and gets moving.      OBJECTIVE    Modality rationale: decrease pain and increase tissue extensibility to improve the patients ability to tolerate daily tasks   Min Type Additional Details    [] Estim:  []Unatt       []IFC  []Premod                        []Other:  []w/ice   []w/heat  Position:  Location:    [] Estim: []Att    []TENS instruct  []NMES                    []Other:  []w/US   []w/ice   []w/heat  Position:  Location:    []  Traction: [] Cervical       []Lumbar                       [] Prone          []Supine                       []Intermittent   []Continuous Lbs:  [] before manual  [] after manual    []  Ultrasound: []Continuous   [] Pulsed                           []1MHz   []3MHz W/cm2:  Location:    []  Iontophoresis with dexamethasone         Location: [] Take home patch   [] In clinic   10 []  Ice     [x]  heat  []  Ice massage  []  Laser   []  Anodyne Position: supine  Location[de-identified] c/s     []  Laser with stim  []  Other:  Position:  Location:    []  Vasopneumatic Device    []  Right     []  Left  Pre-treatment girth:  Post-treatment girth:  Measured at (location):  Pressure:       [] lo [] med [] hi   Temperature: [] lo [] med [] hi   [x] Skin assessment post-treatment:  [x]intact []redness- no adverse reaction    []redness - adverse reaction:       16 min Therapeutic Exercise:  [x] See flow sheet :   Rationale: increase ROM, increase strength, and increase proprioception to improve the patients ability to improve ease of ADLs and daily tasks    15 min Manual Therapy  [x]  See flow sheet : SOR; STM to posterior scalenes, splenius capitus   Rationale: increase strength and increase proprioception  to improve the patients ability to reduce pain and improve ease of ADLs and daily tasks       With   [] TE   [] TA   [] neuro   [] other: Patient Education: [x] Review HEP    [] Progressed/Changed HEP based on:   [] positioning   [] body mechanics   [] transfers   [] heat/ice application    [] other:     Other Objective/Functional Measures:   - decreased pain and HA after manual therapy  - performed no money in supine with initial cuing for form     Pain Level (0-10 scale) post treatment: 1/10 headache; 2/10 neck    ASSESSMENT/Changes in Function:   Patient continues to present with cervical pain and HA, slightly worse today which he attributes to weather. Continued decrease n pain and HA after manual therapy, although minimal muscle tension palpated during manual. Patient educated on neutral c/sin supine as he lays with head in cervical extension. Patient will continue to benefit from skilled PT services to modify and progress therapeutic interventions, address ROM deficits, address strength deficits, analyze and address soft tissue restrictions, analyze and cue movement patterns, analyze and modify body mechanics/ergonomics, assess and modify postural abnormalities, and instruct in home and community integration to attain remaining goals.      Progress towards goals / Updated goals:  Goal: Pt will improve FOTO by 7 points in order to demonstrate functional improvement. Status at evaluation/last progress note: Not met. Declined 1 point to 52/100      2. Goal: Pt will report 50% improvement in symptoms since start of care in order to demonstrate improvement in QOL   Status at evaluation/last progress note: Pt reports 40% improvement since start of care      3. Goal: Pt will report </= 4 headaches per week in order to improve QOL  Status at evaluation/last progress note: Continues to have daily headaches but less intensity   Current Status: No change 12/12/22     4. Goal: Pt will improve left cervical rotation AROM to 55* in order to improve ease of ADL's and checking blind spots while driving. Status at evaluation/last progress note: Progressing. Cervical AROM: Left:49*, Right 67*      5. Goal: Pt will report a little difficulty with turning to look behind while driving in order to demonstrate improved ease of driving.    Status at evaluation/last progress note: Pt reports moderate difficulty      PLAN  [x]  Upgrade activities as tolerated     [x]  Continue plan of care  []  Update interventions per flow sheet       []  Discharge due to:_  []  Other:_      [facility], PTA 12/22/2022  3:12 PM    Future Appointments   Date Time Provider Dex [place]   12/22/2022  2:30 PM Rosenda Corona PTA [facility]   12/27/2022  1:40 PM [facility] NURSE [facility]   12/28/2022  1:30 PM Juliet Fairchild PTA [facility]   12/30/2022  1:30 PM Juliet Fairchild PTA [facility]   1/4/2023  1:15 PM Yamileth Chinchilla MD [facility] [facility]   1/17/2023  1:30 PM Silver Dorsey MD [facility] BS AMB   2/6/2023  2:45 PM Silver Dorsey MD [facility] BS AMB   5/18/2023  1:00 PM eJse Velez PA-C [facility] BS AMB

## 2022-12-28 ENCOUNTER — HOSPITAL ENCOUNTER (OUTPATIENT)
Dept: PHYSICAL THERAPY | Age: 67
Discharge: HOME | End: 2022-12-28
Payer: MEDICARE

## 2022-12-28 PROCEDURE — 97140 MANUAL THERAPY 1/> REGIONS: CPT

## 2022-12-28 PROCEDURE — 97110 THERAPEUTIC EXERCISES: CPT

## 2022-12-28 NOTE — PROGRESS NOTES
PT DAILY TREATMENT NOTE     Patient Name: Jamila Monteiro  FJG  : 1955  [x]  Patient  Verified  Payor: [place] MEDICARE / Plan: VA MEDICARE PART A & B / Product Type: Medicare /    In time: 1:30    Out time: 2:21  Total Treatment Time (min): 51  Visit #: 7 of 12    Medicare/BCBS Only   Total Timed Codes (min): 41 1:1 Treatment Time: 25       Treatment Area: Neck pain [M54.2]    SUBJECTIVE  Pain Level (0-10 scale): 2/10 neck and headache  Any medication changes, allergies to medications, adverse drug reactions, diagnosis change, or new procedure performed?: [x] No    [] Yes (see summary sheet for update)  Subjective functional status/changes:   [] No changes reported  Pt reports he had a fall over the weekend carrying an ice chest with his wife down some stairs and he thinks she may have dropped it which caused him to fall forward over top of it. He didn't hit his head but landed slightly on his side and back. He reports some soreness initially but feels back to baseline. He thinks Friday may be his last day but he will let us know for sure. He has a slight headache but not too bad and feels he will likely get the second shot his MD said for his neck.        OBJECTIVE    Modality rationale: decrease pain and increase tissue extensibility to improve the patients ability to tolerate daily tasks   Min Type Additional Details    [] Estim:  []Unatt       []IFC  []Premod                        []Other:  []w/ice   []w/heat  Position:  Location:    [] Estim: []Att    []TENS instruct  []NMES                    []Other:  []w/US   []w/ice   []w/heat  Position:  Location:    []  Traction: [] Cervical       []Lumbar                       [] Prone          []Supine                       []Intermittent   []Continuous Lbs:  [] before manual  [] after manual    []  Ultrasound: []Continuous   [] Pulsed                           []1MHz   []3MHz W/cm2:  Location:    []  Iontophoresis with dexamethasone Location: [] Take home patch   [] In clinic   10 []  Ice     [x]  heat  []  Ice massage  []  Laser   []  Anodyne Position: supine  Location[de-identified] c/s     []  Laser with stim  []  Other:  Position:  Location:    []  Vasopneumatic Device    []  Right     []  Left  Pre-treatment girth:  Post-treatment girth:  Measured at (location):  Pressure:       [] lo [] med [] hi   Temperature: [] lo [] med [] hi   [x] Skin assessment post-treatment:  [x]intact []redness- no adverse reaction    []redness - adverse reaction:       31 min Therapeutic Exercise:  [x] See flow sheet :   Rationale: increase ROM, increase strength, and increase proprioception to improve the patients ability to improve ease of ADLs and daily tasks    10 min Manual Therapy  [x]  See flow sheet : lower c/s and upper t/s PA mobs grade III-IV; TPR left levator scapulae    Rationale: increase strength and increase proprioception  to improve the patients ability to reduce pain and improve ease of ADLs and daily tasks       With   [] TE   [] TA   [] neuro   [] other: Patient Education: [x] Review HEP    [] Progressed/Changed HEP based on:   [] positioning   [] body mechanics   [] transfers   [] heat/ice application    [] other:     Other Objective/Functional Measures:   Improving head posture  Reducing muscle restrictions noted in c/s  Still needs postural endurance    Pain Level (0-10 scale) post treatment: 1-2/10    ASSESSMENT/Changes in Function: Pt progressing with improving posture awareness. He has reduction of c/s muscle tightness and reducing headache symptoms. He continues to need postural endurance but will likely be able to progress independently at home with updated HEP. Will reassess goals next session for continuation versus D/C pending pt progression.        Patient will continue to benefit from skilled PT services to modify and progress therapeutic interventions, address ROM deficits, address strength deficits, analyze and address soft tissue restrictions, analyze and cue movement patterns, analyze and modify body mechanics/ergonomics, assess and modify postural abnormalities, and instruct in home and community integration to attain remaining goals. Progress towards goals / Updated goals:  Goal: Pt will improve FOTO by 7 points in order to demonstrate functional improvement. Status at evaluation/last progress note: Not met. Declined 1 point to 52/100      2. Goal: Pt will report 50% improvement in symptoms since start of care in order to demonstrate improvement in QOL   Status at evaluation/last progress note: Pt reports 40% improvement since start of care      3. Goal: Pt will report </= 4 headaches per week in order to improve QOL  Status at evaluation/last progress note: Continues to have daily headaches but less intensity   Current Status: No change 12/12/22     4. Goal: Pt will improve left cervical rotation AROM to 55* in order to improve ease of ADL's and checking blind spots while driving. Status at evaluation/last progress note: Progressing. Cervical AROM: Left:49*, Right 67*      5. Goal: Pt will report a little difficulty with turning to look behind while driving in order to demonstrate improved ease of driving.    Status at evaluation/last progress note: Pt reports moderate difficulty      PLAN  [x]  Upgrade activities as tolerated     [x]  Continue plan of care  []  Update interventions per flow sheet       []  Discharge due to:_  []  Other:_      Brianna Rivera PTA 12/28/2022  3:12 PM    Future Appointments   Date Time Provider Dex [place]   12/28/2022  1:30 PM [facility]   12/30/2022  1:30 PM Flaco Llanes PTA [facility]   1/4/2023  1:15 PM Galilea Chinchilla MD [place]   1/17/2023  1:30 PM Yodit Pierce MD [facility] BS AMB   2/6/2023  2:45 PM Yodit Pierce MD [facility] BS AMB   5/18/2023  1:00 PM Khalif Hancock PA-C [facility] BS AMB

## 2022-12-30 ENCOUNTER — HOSPITAL ENCOUNTER (OUTPATIENT)
Dept: PHYSICAL THERAPY | Age: 67
End: 2022-12-30
Payer: MEDICARE

## 2022-12-30 PROCEDURE — 97530 THERAPEUTIC ACTIVITIES: CPT

## 2022-12-30 PROCEDURE — 97140 MANUAL THERAPY 1/> REGIONS: CPT

## 2022-12-30 NOTE — PROGRESS NOTES
PT DAILY TREATMENT NOTE     Patient Name: Michael Arnold  MESU:  : 1955  [x]  Patient  Verified  Payor: VA MEDICARE / Plan: VA MEDICARE PART A & B / Product Type: Medicare /    In time: 1:30    Out time: 2:19  Total Treatment Time (min): 49  Visit #: 8 of 12    Medicare/BCBS Only   Total Timed Codes (min): 39 1:1 Treatment Time: 30       Treatment Area: Neck pain [M54.2]    SUBJECTIVE  Pain Level (0-10 scale): 3/10 neck and headache  Any medication changes, allergies to medications, adverse drug reactions, diagnosis change, or new procedure performed?: [x] No    [] Yes (see summary sheet for update)  Subjective functional status/changes:   [] No changes reported  Pt reports 2 good days after his last session. He still gets headaches but of less intensity. He would like to continue with therapy.        OBJECTIVE    Modality rationale: decrease pain and increase tissue extensibility to improve the patients ability to tolerate daily tasks   Min Type Additional Details    [] Estim:  []Unatt       []IFC  []Premod                        []Other:  []w/ice   []w/heat  Position:  Location:    [] Estim: []Att    []TENS instruct  []NMES                    []Other:  []w/US   []w/ice   []w/heat  Position:  Location:    []  Traction: [] Cervical       []Lumbar                       [] Prone          []Supine                       []Intermittent   []Continuous Lbs:  [] before manual  [] after manual    []  Ultrasound: []Continuous   [] Pulsed                           []1MHz   []3MHz W/cm2:  Location:    []  Iontophoresis with dexamethasone         Location: [] Take home patch   [] In clinic   10 []  Ice     [x]  heat  []  Ice massage  []  Laser   []  Anodyne Position: supine  Location[de-identified] c/s     []  Laser with stim  []  Other:  Position:  Location:    []  Vasopneumatic Device    []  Right     []  Left  Pre-treatment girth:  Post-treatment girth:  Measured at (location):  Pressure:       [] lo [] med [] hi Temperature: [] lo [] med [] hi   [x] Skin assessment post-treatment:  [x]intact []redness- no adverse reaction    []redness - adverse reaction:       24 min Therapeutic Activity:  [x] See flow sheet : goal reassessment   Rationale: increase ROM, increase strength, and increase proprioception to improve the patients ability to improve ease of ADLs and daily tasks    15 min Manual Therapy  [x]  See flow sheet : PA mobs    Rationale: increase strength and increase proprioception  to improve the patients ability to reduce pain and improve ease of ADLs and daily tasks       With   [] TE   [] TA   [] neuro   [] other: Patient Education: [x] Review HEP    [] Progressed/Changed HEP based on:   [] positioning   [] body mechanics   [] transfers   [] heat/ice application    [] other:     Other Objective/Functional Measures: FOTO: 59/100  % Improvement: 30%  Headaches per week: daily but less intensity  C/s rotation AROM: left 35 degrees right 47 degrees    Pain Level (0-10 scale) post treatment: 1/10    ASSESSMENT/Changes in Function: See Recertification. Patient will continue to benefit from skilled PT services to modify and progress therapeutic interventions, address ROM deficits, address strength deficits, analyze and address soft tissue restrictions, analyze and cue movement patterns, analyze and modify body mechanics/ergonomics, assess and modify postural abnormalities, and instruct in home and community integration to attain remaining goals. Progress towards goals / Updated goals:  Goal: Pt will improve FOTO by 7 points in order to demonstrate functional improvement. Status at evaluation/last progress note: Not met. Declined 1 point to 52/100   Progressing 59/100     2. Goal: Pt will report 50% improvement in symptoms since start of care in order to demonstrate improvement in QOL   Status at evaluation/last progress note: Pt reports 40% improvement since start of care   30%     3.    Goal: Pt will report </= 4 headaches per week in order to improve QOL  Status at evaluation/last progress note: Continues to have daily headaches but less intensity   Current Status: No change 12/12/22  Decreased intensity of headaches     4. Goal: Pt will improve left cervical rotation AROM to 55* in order to improve ease of ADL's and checking blind spots while driving. Status at evaluation/last progress note: Progressing. Cervical AROM: Left:49*, Right 67*   C/s rotation AROM: left 35 degrees right 47 degrees     5. Goal: Pt will report a little difficulty with turning to look behind while driving in order to demonstrate improved ease of driving.    Status at evaluation/last progress note: Pt reports moderate difficulty      PLAN  [x]  Upgrade activities as tolerated     [x]  Continue plan of care  []  Update interventions per flow sheet       []  Discharge due to:_  []  Other:_      Jerardo Abarca, PTA 12/30/2022  3:12 PM    Future Appointments   Date Time Provider Dex Alysia   12/30/2022  1:30 PM Sukh Randally [facility]   1/4/2023  1:15 PM Bri Chinchilla MD [facility]   1/17/2023  1:30 PM Lyndsey Rizo MD [facility] BS AMB   2/6/2023  2:45 PM Lyndsey Rizo MD [facility] BS AMB   5/18/2023  1:00 PM Khalida Reyes PA-C [facility] BS AMB

## 2023-01-03 NOTE — THERAPY RECERTIFICATION
[facility] [place] [facility]  [address] [facility]  [phone] fax    Continued Plan of Care/ Re-certification for Physical Therapy Services    Patient name: Ko Diggs Start of Care: 11/3/22   Referral source: Mateo Welsh MD : 1955   Medical/Treatment Diagnosis: Neck pain [M54.2]  Payor: Henri Garcia / Plan: [place] MEDICARE PART A & B / Product Type: Medicare /  Onset Date:long history with progressive worsening      Prior Hospitalization: see medical history Provider#: [id number]   Medications: Verified on Patient Summary List    Comorbidities: HTN, arthritis, hearing impaired, hx of DVT, hx of prostate cancer, spinal stenosis, hx of B RTC repair    Prior Level of Function: camping, hiking, fishing, retired, [employer] for [employer], [place]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 Status at last note/certification:  Not met. Declined 1 point to 52/100   Current Status: not met 59/100    Goal: Pt will report 50% improvement in symptoms since start of care in order to demonstrate improvement in QOL   Status at last note/certification: Pt reports 40% improvement since start of care   Current Status: not met 30%    Goal: Pt will report </= 4 headaches per week in order to improve QOL  Status at last note/certification: Continues to have daily headaches but less intensity   Current Status: not met daily but less intensity    Goal: Pt will improve left cervical rotation AROM to 55* in order to improve ease of ADL's and checking blind spots while driving. Status at last note/certification: Progressing.  Cervical AROM: Left:49*, Right 67*   Current Status: not met C/s rotation AROM: left 35 degrees right 47 degrees    Goal: Pt will report a little difficulty with turning to look behind while driving in order to demonstrate improved ease of driving. Status at last note/certification: Pt reports moderate difficulty   Current Status: met    Key functional changes: improved posture; decreased headache intensity    Problems/ barriers to goal attainment: none    Problem List: pain affecting function, decrease ROM, decrease strength, decrease ADL/ functional abilitiies, decrease activity tolerance, and decrease flexibility/ joint mobility    Treatment Plan: Therapeutic exercise, Neuromuscular reeducation, Manual therapy, Therapeutic activity, Self care/home management, and Electric stim unattended      Patient Goal (s) has been updated and includes: \"less pain, less headaches\"    Goals for this certification period to be accomplished in 4 weeks:  1. Patient will improve FOTO at least 7 points (60/100) to demonstrate improvement in functional mobility. Status at evaluation/last progress note: 59/100  2. Patient will report \"A little bit of difficulty\" when asked on the FOTO questionnaire, \"How much difficulty do you have turning to look behind you? \" To demonstrate improved ease of driving. Status at evaluation/last progress note: \"moderate difficulty\"  3. Pt will improve left cervical rotation AROM to 55* in order to improve ease of ADLs and checking blind spots while driving. Status at evaluation/last progress note: C/s rotation AROM: left 35 degrees right 47 degrees  4. Pt will report 50% improvement in symptoms since start of care in order to demonstrate improvement in QOL. Status at evaluation/last progress note: 30%    Frequency / Duration: Patient to be seen 2 times per week for 4 weeks:    Assessment / Recommendations: Mr. Froy Kebede is making slow, steady progress towards goals in therapy in 10 visits. He improved his FOTO score to 59/100 indicating overall improvement in function and reported 30% improvement since start of care.  He demonstrates improvement in head posture with hypomobility and decreased endurance limiting full return to neutral head position. He continues with limited c/s rotation AROM left 35 degrees and right 47 degrees but it does fluctuate depending on pain level and headache. His headaches continue at least daily but are of less intensity since starting PT. Skilled PT remains medically necessary to progress c/s and t/s mobility and improve postural endurance to reduce forward head posture and lessen cervicogenic headaches for ease of sleeping, driving and performing ADLs. Certification Period: 12/31/22 to 1/29/23    Rosenda Plummer, PTA 1/3/2023 1:17 PM    ________________________________________________________________________  I certify that the above Therapy Services are being furnished while the patient is under my care. I agree with the treatment plan and certify that this therapy is necessary. [] I have read the above and request that my patient continue as recommended.   [] I have read the above report and request that my patient continue therapy with the following changes/special instructions: _______________________________________  [] I have read the above report and request that my patient be discharged from therapy    Physician's Signature:____________Date:_________TIME:________     Aislinn Grady MD  ** Signature, Date and Time must be completed for valid certification **    Please sign and return to [facility]antel ERIC  [address] [place] [address]  [phone] fax

## 2023-01-05 ENCOUNTER — HOSPITAL ENCOUNTER (OUTPATIENT)
Dept: PHYSICAL THERAPY | Age: 68
Discharge: HOME | End: 2023-01-05
Payer: MEDICARE

## 2023-01-05 PROCEDURE — 97110 THERAPEUTIC EXERCISES: CPT

## 2023-01-05 NOTE — PROGRESS NOTES
PT DAILY TREATMENT NOTE     Patient Name: Bob Luque  Date:2023  : 1955  [x]  Patient  Verified  Payor: VA MEDICARE / Plan: VA MEDICARE PART A & B / Product Type: Medicare /    In time: 11:31 Out time: 12:15  Total Treatment Time (min): 44  Visit #: 1 of 8    Medicare/BCBS Only   Total Timed Codes (min): 34 1:1 Treatment Time: 30       Treatment Area: Neck pain [M54.2]    SUBJECTIVE  Pain Level (0-10 scale): 3/10 c/s  Any medication changes, allergies to medications, adverse drug reactions, diagnosis change, or new procedure performed?: [x] No    [] Yes (see summary sheet for update)  Subjective functional status/changes:   [] No changes reported  Pt reports he had a headache this morning but it is better now. He states his wife has noticed his posture improving. He is trying to be more aware at home.        OBJECTIVE    Modality rationale: decrease pain and increase tissue extensibility to improve the patients ability to tolerate daily tasks   Min Type Additional Details    [] Estim:  []Unatt       []IFC  []Premod                        []Other:  []w/ice   []w/heat  Position:  Location:    [] Estim: []Att    []TENS instruct  []NMES                    []Other:  []w/US   []w/ice   []w/heat  Position:  Location:    []  Traction: [] Cervical       []Lumbar                       [] Prone          []Supine                       []Intermittent   []Continuous Lbs:  [] before manual  [] after manual    []  Ultrasound: []Continuous   [] Pulsed                           []1MHz   []3MHz W/cm2:  Location:    []  Iontophoresis with dexamethasone         Location: [] Take home patch   [] In clinic   10 []  Ice     [x]  heat  []  Ice massage  []  Laser   []  Anodyne Position: supine  Location[de-identified] c/s     []  Laser with stim  []  Other:  Position:  Location:    []  Vasopneumatic Device    []  Right     []  Left  Pre-treatment girth:  Post-treatment girth:  Measured at (location):  Pressure:       [] lo [] med [] hi   Temperature: [] lo [] med [] hi   [x] Skin assessment post-treatment:  [x]intact []redness- no adverse reaction    []redness - adverse reaction:       29 min Therapeutic Exercise:  [x] See flow sheet :    Rationale: increase ROM, increase strength, and increase proprioception to improve the patients ability to improve ease of ADLs and daily tasks    5 min Manual Therapy  [x]  See flow sheet : PA mobs to c/s lower and upper t/s during c/s retractions    Rationale: increase strength and increase proprioception  to improve the patients ability to reduce pain and improve ease of ADLs and daily tasks       With   [] TE   [] TA   [] neuro   [] other: Patient Education: [x] Review HEP    [] Progressed/Changed HEP based on:   [] positioning   [] body mechanics   [] transfers   [] heat/ice application    [] other:     Other Objective/Functional Measures:   Improving ability to retract more neutral  Unable to touch wall with back of head standing fully erect at wall  Decreased lower c/s and upper t/s PA mobility  Decreasing headache frequency/intensity    Pain Level (0-10 scale) post treatment: 2/10    ASSESSMENT/Changes in Function: Pt continues to need progression of c/s and t/s mobility while addressing postural endurance. He has improving head posture but continues to get cervicogenic headaches. Will continue to progress strength and mobility for pt to hold more neutral head position for decreased pain. Patient will continue to benefit from skilled PT services to modify and progress therapeutic interventions, address ROM deficits, address strength deficits, analyze and address soft tissue restrictions, analyze and cue movement patterns, analyze and modify body mechanics/ergonomics, assess and modify postural abnormalities, and instruct in home and community integration to attain remaining goals. Goals for this certification period to be accomplished in 4 weeks:  1.  Patient will improve FOTO at least 7 points (60/100) to demonstrate improvement in functional mobility. Status at evaluation/last progress note: 59/100  2. Patient will report \"A little bit of difficulty\" when asked on the FOTO questionnaire, \"How much difficulty do you have turning to look behind you? \" To demonstrate improved ease of driving. Status at evaluation/last progress note: \"moderate difficulty\"  3. Pt will improve left cervical rotation AROM to 55* in order to improve ease of ADLs and checking blind spots while driving. Status at evaluation/last progress note: C/s rotation AROM: left 35 degrees right 47 degrees  4. Pt will report 50% improvement in symptoms since start of care in order to demonstrate improvement in QOL.   Status at evaluation/last progress note: 30%     PLAN  [x]  Upgrade activities as tolerated     [x]  Continue plan of care  []  Update interventions per flow sheet       []  Discharge due to:_  []  Other:_      Caldwell Buerger, PTA 1/5/2023  3:12 PM    Future Appointments   Date Time Provider Dex [place]   1/5/2023 11:30 AM Lisa Qureshi PTA [facility]   1/6/2023  2:00 PM Lisa Qureshi PTA [facility]   1/9/2023  3:30 PM Godfrey Noonan, PT [facility]   1/13/2023  1:30 PM Lisa Qureshi PTA [facility]   1/17/2023  1:30 PM Kobe Booker MD [facility] BS AMB   2/6/2023  2:45 PM Kobe Booker MD [facility] BS AMB   5/18/2023  1:00 PM ESPERANZA Goldberg BS AMB   12/28/2023  1:40 PM [facility] NURSE [facility]   1/4/2024  1:40 PM CARA Austin [id number]

## 2023-01-06 ENCOUNTER — HOSPITAL ENCOUNTER (OUTPATIENT)
Dept: PHYSICAL THERAPY | Age: 68
End: 2023-01-06
Payer: MEDICARE

## 2023-01-09 ENCOUNTER — HOSPITAL ENCOUNTER (OUTPATIENT)
Dept: PHYSICAL THERAPY | Age: 68
Discharge: HOME | End: 2023-01-09
Payer: MEDICARE

## 2023-01-09 PROCEDURE — 97110 THERAPEUTIC EXERCISES: CPT

## 2023-01-09 NOTE — PROGRESS NOTES
PT DAILY TREATMENT NOTE     Patient Name: Fior Laird  Date:2023  : 1955  [x]  Patient  Verified  Payor: VA MEDICARE / Plan: VA MEDICARE PART A & B / Product Type: Medicare /    In time: 3:31 Out time: 4:13  Total Treatment Time (min):42  Visit #: 2 of 8    Medicare/BCBS Only   Total Timed Codes (min): 32 1:1 Treatment Time: 32       Treatment Area: Neck pain [M54.2]    SUBJECTIVE  Pain Level (0-10 scale): 3/10 neck  Any medication changes, allergies to medications, adverse drug reactions, diagnosis change, or new procedure performed?: [x] No    [] Yes (see summary sheet for update)  Subjective functional status/changes:   [] No changes reported  Pt reports a sharp pain in his knee last week that made it hard to walk. He goes tomorrow to see the MD about it. He has a follow up with  Dr. Ella Otero next week and also the MD that gave him an injection in his neck. He feels improvement overall and will schedule another week of PT and see what the MDs have to say with his follow up.      OBJECTIVE    Modality rationale: decrease pain and increase tissue extensibility to improve the patients ability to tolerate daily tasks   Min Type Additional Details    [] Estim:  []Unatt       []IFC  []Premod                        []Other:  []w/ice   []w/heat  Position:  Location:    [] Estim: []Att    []TENS instruct  []NMES                    []Other:  []w/US   []w/ice   []w/heat  Position:  Location:    []  Traction: [] Cervical       []Lumbar                       [] Prone          []Supine                       []Intermittent   []Continuous Lbs:  [] before manual  [] after manual    []  Ultrasound: []Continuous   [] Pulsed                           []1MHz   []3MHz W/cm2:  Location:    []  Iontophoresis with dexamethasone         Location: [] Take home patch   [] In clinic   10 []  Ice     [x]  heat  []  Ice massage  []  Laser   []  Anodyne Position: supine  Location[de-identified] c/s     []  Laser with stim  []  Other: Position:  Location:    []  Vasopneumatic Device    []  Right     []  Left  Pre-treatment girth:  Post-treatment girth:  Measured at (location):  Pressure:       [] lo [] med [] hi   Temperature: [] lo [] med [] hi   [x] Skin assessment post-treatment:  [x]intact []redness- no adverse reaction    []redness - adverse reaction:       32 min Therapeutic Exercise:  [x] See flow sheet :    Rationale: increase ROM, increase strength, and increase proprioception to improve the patients ability to improve ease of ADLs and daily tasks      With   [] TE   [] TA   [] neuro   [] other: Patient Education: [x] Review HEP    [] Progressed/Changed HEP based on:   [] positioning   [] body mechanics   [] transfers   [] heat/ice application    [] other:     Other Objective/Functional Measures:   Performed rich exercises seated due to left knee pain  Good form with exercises and cueing  Cues to reduce forward head with sitting and maintain set shoulders    Pain Level (0-10 scale) post treatment: 2/10    ASSESSMENT/Changes in Function: Pt progressing towards final goals in therapy. He has improved posture awareness and just lacks endurance to maintain. He has decreased frequency/intensity of headaches. He needs to continue with progression of postural endurance. Patient will continue to benefit from skilled PT services to modify and progress therapeutic interventions, address ROM deficits, address strength deficits, analyze and address soft tissue restrictions, analyze and cue movement patterns, analyze and modify body mechanics/ergonomics, assess and modify postural abnormalities, and instruct in home and community integration to attain remaining goals. Goals for this certification period to be accomplished in 4 weeks:  1. Patient will improve FOTO at least 7 points (60/100) to demonstrate improvement in functional mobility. Status at evaluation/last progress note: 59/100  2.  Patient will report \"A little bit of difficulty\" when asked on the FOTO questionnaire, \"How much difficulty do you have turning to look behind you? \" To demonstrate improved ease of driving. Status at evaluation/last progress note: \"moderate difficulty\"  3. Pt will improve left cervical rotation AROM to 55* in order to improve ease of ADLs and checking blind spots while driving. Status at evaluation/last progress note: C/s rotation AROM: left 35 degrees right 47 degrees  4. Pt will report 50% improvement in symptoms since start of care in order to demonstrate improvement in QOL.   Status at evaluation/last progress note: 30%     PLAN  [x]  Upgrade activities as tolerated     [x]  Continue plan of care  []  Update interventions per flow sheet       []  Discharge due to:_  []  Other:_      Cruzito Arreguin PTA 1/9/2023  3:12 PM    Future Appointments   Date Time Provider Dex Hatfield   1/9/2023  3:30 PM Giovana Urbano PTA [facility]   1/13/2023  1:30 PM Giovana Urbano PTA [facility]   1/13/2023  3:05 PM ESPERANZA Toscano BS AMB   1/17/2023  1:30 PM MD AMY WaggonerMO BS AMB   2/6/2023  2:45 PM MD AMY aWggonerMO BS AMB   5/18/2023  1:00 PM ESPERANZA Toscano BS AMB   12/28/2023  1:40 PM [facility] NURSE [facility] ROXANNE SILVA   1/4/2024  1:40 PM CARA Liu [address]

## 2023-01-12 ENCOUNTER — HOSPITAL ENCOUNTER (OUTPATIENT)
Dept: PHYSICAL THERAPY | Age: 68
Discharge: HOME | End: 2023-01-12
Payer: MEDICARE

## 2023-01-12 PROCEDURE — 97140 MANUAL THERAPY 1/> REGIONS: CPT

## 2023-01-12 PROCEDURE — 97110 THERAPEUTIC EXERCISES: CPT

## 2023-01-12 NOTE — PROGRESS NOTES
PT DAILY TREATMENT NOTE     Patient Name: Janae Harris  Date:2023  : 1955  [x]  Patient  Verified  Payor: VA MEDICARE / Plan: VA MEDICARE PART A & B / Product Type: Medicare /    In time: 1:03 Out time: 1:45   Total Treatment Time (min):42  Visit #: 3 of 8    Medicare/BCBS Only   Total Timed Codes (min): 32 1:1 Treatment Time: 32       Treatment Area: Neck pain [M54.2]    SUBJECTIVE  Pain Level (0-10 scale): 2-3/10  Any medication changes, allergies to medications, adverse drug reactions, diagnosis change, or new procedure performed?: [x] No    [] Yes (see summary sheet for update)  Subjective functional status/changes:   [] No changes reported  Pt reports his headache and neck always feel good after therapy and usually into the next day. He states he had a sinus headache last night and a small headache on the left back of his head that was bad this morning. He notes most of the stenosis is on the left side. He goes next week back to the MD. The back of his left knee is still bothering him but he goes to see the MD tomorrow for that.        OBJECTIVE    Modality rationale: decrease pain and increase tissue extensibility to improve the patients ability to tolerate daily tasks   Min Type Additional Details    [] Estim:  []Unatt       []IFC  []Premod                        []Other:  []w/ice   []w/heat  Position:  Location:    [] Estim: []Att    []TENS instruct  []NMES                    []Other:  []w/US   []w/ice   []w/heat  Position:  Location:    []  Traction: [] Cervical       []Lumbar                       [] Prone          []Supine                       []Intermittent   []Continuous Lbs:  [] before manual  [] after manual    []  Ultrasound: []Continuous   [] Pulsed                           []1MHz   []3MHz W/cm2:  Location:    []  Iontophoresis with dexamethasone         Location: [] Take home patch   [] In clinic   10 []  Ice     [x]  heat  []  Ice massage  []  Laser   []  Anodyne Position: supine  Location[de-identified] c/s     []  Laser with stim  []  Other:  Position:  Location:    []  Vasopneumatic Device    []  Right     []  Left  Pre-treatment girth:  Post-treatment girth:  Measured at (location):  Pressure:       [] lo [] med [] hi   Temperature: [] lo [] med [] hi   [x] Skin assessment post-treatment:  [x]intact []redness- no adverse reaction    []redness - adverse reaction:       22 min Therapeutic Exercise:  [x] See flow sheet : pt ed   Rationale: increase ROM, increase strength, and increase proprioception to improve the patients ability to improve ease of ADLs and daily tasks    10 min Manual Therapy:  [x] See flow sheet : TPR left splenius capitus and left middle posterior scalenes; 1st rib mobs grade III on the left   Rationale: increase ROM, increase strength, and increase proprioception to improve the patients ability to improve ease of ADLs and daily tasks      With   [] TE   [] TA   [] neuro   [] other: Patient Education: [x] Review HEP    [] Progressed/Changed HEP based on:   [] positioning   [] body mechanics   [] transfers   [] heat/ice application    [] other:     Other Objective/Functional Measures:   Reviewed gentle HS stretch and light TPR to medial hamstrings; educated on heat for gentle relaxation  TTP left scalenes and left splenius capitus; reviewed stretching  Improving posture but still decreased lower c/s and upper t/s mobility  Educated to continue working on retractions and sitting posture at home    Pain Level (0-10 scale) post treatment: 2/10    ASSESSMENT/Changes in Function: Pt progressing with overall improvement in posture. He demonstrates continued hypomobility in the lower c/s and upper t/s. He has decreased postural endurance. Will continue to progress endurance and independence with exercises for long term carryover.      Patient will continue to benefit from skilled PT services to modify and progress therapeutic interventions, address ROM deficits, address strength deficits, analyze and address soft tissue restrictions, analyze and cue movement patterns, analyze and modify body mechanics/ergonomics, assess and modify postural abnormalities, and instruct in home and community integration to attain remaining goals. Goals for this certification period to be accomplished in 4 weeks:  1. Patient will improve FOTO at least 7 points (60/100) to demonstrate improvement in functional mobility. Status at evaluation/last progress note: 59/100  2. Patient will report \"A little bit of difficulty\" when asked on the FOTO questionnaire, \"How much difficulty do you have turning to look behind you? \" To demonstrate improved ease of driving. Status at evaluation/last progress note: \"moderate difficulty\"  3. Pt will improve left cervical rotation AROM to 55* in order to improve ease of ADLs and checking blind spots while driving. Status at evaluation/last progress note: C/s rotation AROM: left 35 degrees right 47 degrees  4. Pt will report 50% improvement in symptoms since start of care in order to demonstrate improvement in QOL.   Status at evaluation/last progress note: 30%     PLAN  [x]  Upgrade activities as tolerated     [x]  Continue plan of care  []  Update interventions per flow sheet       []  Discharge due to:_  []  Other:_      Treasure Shaffer PTA 1/12/2023  3:12 PM    Future Appointments   Date Time Provider Dex Hatfield   1/12/2023  1:00 PM Antwan Doss PTA [facility]   1/13/2023  3:05 PM Florentina Cockayne, PA-C [facility] BS AMB   1/16/2023  4:00 PM Nitin Marsh PT [id number]   1/17/2023  1:30 PM MD AMY MatosMO BS AMB   1/20/2023  2:30 PM Antwan Doss PTA [facility]   2/6/2023  2:45 PM MD AMY MatosMO BS AMB   5/18/2023  1:00 PM Florentina Cockayne, PA-C [facility] BS AMB   12/28/2023  1:40 PM [facility] NURSE [facility]   1/4/2024  1:40 PM CARA Bone [id number]

## 2023-01-13 ENCOUNTER — OFFICE VISIT (OUTPATIENT)
Dept: ORTHOPEDIC SURGERY | Age: 68
End: 2023-01-13
Payer: MEDICARE

## 2023-01-13 ENCOUNTER — APPOINTMENT (OUTPATIENT)
Dept: PHYSICAL THERAPY | Age: 68
End: 2023-01-13
Payer: MEDICARE

## 2023-01-13 DIAGNOSIS — M70.52 PES ANSERINUS BURSITIS OF LEFT KNEE: ICD-10-CM

## 2023-01-13 DIAGNOSIS — M25.562 LEFT KNEE PAIN, UNSPECIFIED CHRONICITY: ICD-10-CM

## 2023-01-13 DIAGNOSIS — Z96.652 S/P REVISION OF TOTAL KNEE, LEFT: Primary | ICD-10-CM

## 2023-01-13 DIAGNOSIS — Z91.81 HISTORY OF RECENT FALL: ICD-10-CM

## 2023-01-13 RX ORDER — CLINDAMYCIN HYDROCHLORIDE 300 MG/1
CAPSULE ORAL
Qty: 3 CAPSULE | Refills: 2 | Status: SHIPPED | OUTPATIENT
Start: 2023-01-13

## 2023-01-13 RX ORDER — BETAMETHASONE SODIUM PHOSPHATE AND BETAMETHASONE ACETATE 3; 3 MG/ML; MG/ML
3 INJECTION, SUSPENSION INTRA-ARTICULAR; INTRALESIONAL; INTRAMUSCULAR; SOFT TISSUE ONCE
Status: COMPLETED | OUTPATIENT
Start: 2023-01-13 | End: 2023-01-13

## 2023-01-13 RX ADMIN — BETAMETHASONE SODIUM PHOSPHATE AND BETAMETHASONE ACETATE 3 MG: 3; 3 INJECTION, SUSPENSION INTRA-ARTICULAR; INTRALESIONAL; INTRAMUSCULAR; SOFT TISSUE at 15:29

## 2023-01-13 NOTE — PROGRESS NOTES
[address] [facility], [place], operated by [facility], [address] [facility]  [phone]           Patient: Chrystal Burkitt                MRN: [id number]       SSN: [id number]  YOB: 1955        AGE: 69 y.o. SEX: male  There is no height or weight on file to calculate BMI. PCP: Jennie Small MD  01/13/23            REVIEW OF SYSTEMS:  Constitutional: Negative for fever, chills, weight loss and malaise/fatigue. HENT: Negative. Eyes: Negative. Respiratory: Negative. Cardiovascular: Negative. Gastrointestinal: No bowel incontinence or constipation. Genitourinary: No bladder incontinence or saddle anesthesia. Skin: Negative. Neurological: Negative. Endo/Heme/Allergies: Negative. Psychiatric/Behavioral: Negative. Musculoskeletal: As per HPI above. Past Medical History:   Diagnosis Date    Arthritis     Chronic pain     knee and shoulder    DVT (deep venous thrombosis) ([facility] [address] 1992    post sx. R LEG    DVT (deep venous thrombosis) ([facility]) 2000    POST BACK SX. 2- LEFT LEG    GERD (gastroesophageal reflux disease)     Headache(784.0)     everyday    High cholesterol     Hypertension     Prostate cancer ([facility] [address] 2018    Pure hypercholesterolemia     Spinal stenosis     Thromboembolus ([facility])          Current Outpatient Medications:     diazePAM (VALIUM) 5 mg tablet, diazepam 5 mg tablet (Patient not taking: Reported on 1/4/2023), Disp: , Rfl:     enoxaparin (LOVENOX) 40 mg/0.4 mL, , Disp: , Rfl:     triamcinolone acetonide (KENALOG) 0.1 % topical cream, APPLY 1 APPLICATION TWICE DAILY FOR ECZEMA (Patient not taking: Reported on 1/4/2023), Disp: , Rfl:     metaxalone (SKELAXIN) 800 mg tablet, TAKE 1 TABLET BY MOUTH THREE TIMES DAILY AS NEEDED FOR MUSCLE SPASM, Disp: 90 Tablet, Rfl: 3    diclofenac (VOLTAREN) 1 % gel, Apply 4 g to affected area four (4) times daily. , Disp: 100 g, Rfl: 2    gabapentin (NEURONTIN) 100 mg capsule, Take 1 in the morning and 2 capsules at bedtime as directed  Indications: neuropathic pain, Disp: 270 Capsule, Rfl: 1    ondansetron hcl (Zofran) 4 mg tablet, Take 1 Tablet by mouth every eight (8) hours as needed for Nausea or Vomiting., Disp: 20 Tablet, Rfl: 2    clotrimazole-betamethasone (LOTRISONE) 1-0.05 % lotion, Apply  to affected area two (2) times a day., Disp: 30 mL, Rfl: 0    allopurinoL (ZYLOPRIM) 100 mg tablet, Take 1 Tablet by mouth two (2) times a day., Disp: 60 Tablet, Rfl: 0    L gasseri/B bifidum/B longum ([employer] ), Take 1 Tablet by mouth nightly., Disp: , Rfl:     lansoprazole (PREVACID) 30 mg capsule, Take 30 mg by mouth daily. , Disp: , Rfl:     warfarin (COUMADIN) 5 mg tablet, TAKE 2 AND 1/2 TABLETS BY MOUTH DAILY OR AS DIRECTED (Patient taking differently: Take  by mouth daily. Patient takes 2  TABLETS BY MOUTH Daily or as directed), Disp: 75 Tab, Rfl: 0    lisinopril-hydroCHLOROthiazide (PRINZIDE, ZESTORETIC) 20-12.5 mg per tablet, TAKE 1 TABLET BY MOUTH DAILY (Patient taking differently: Take 1 Tablet by mouth daily. TAKE 1 TABLET BY MOUTH DAILY), Disp: 90 Tab, Rfl: 1    labetalol (NORMODYNE) 100 mg tablet, TAKE 1 TABLET BY MOUTH TWICE DAILY. STOP VERAPAMIL (Patient taking differently: Take  by mouth two (2) times a day.), Disp: 180 Tab, Rfl: 0    butalbital-acetaminophen-caff (FIORICET) -40 mg per capsule, 2 cap three times per day as needed for headache (Patient taking differently: Take 1 Capsule by mouth daily. 2 cap three times per day as needed for headache), Disp: 180 Cap, Rfl: 1    ALPRAZolam (XANAX) 0.5 mg tablet, Take one half(1/2) tab to one(1) tab by mouth at bedtime as needed for sleep (Patient taking differently: Take  by mouth nightly as needed.  Take one half(1/2) tab to one(1) tab by mouth at bedtime as needed for sleep), Disp: 30 Tab, Rfl: 0    montelukast (SINGULAIR) 10 mg tablet, TAKE 1 TABLET BY MOUTH EVERY DAY, Disp: 90 Tab, Rfl: 0    acetaminophen (TYLENOL) 500 mg tablet, Take 1,000 mg by mouth every six (6) hours as needed for Pain., Disp: , Rfl:     Allergies   Allergen Reactions    Amoxicillin Itching    Augmentin [Amoxicillin-Pot Clavulanate] Itching    Chlorhexidine Unknown (comments)    Chlorhexidine Towelette Itching    Hibiclens [Chlorhexidine Gluconate] Itching    Milk Containing Products Diarrhea    Nsaids (Non-Steroidal Anti-Inflammatory Drug) Other (comments)     On blood thinner, contraindicated.      Penicillins Rash    Requip [Ropinirole] Nausea and Vomiting    Simvastatin Other (comments)       Social History     Socioeconomic History    Marital status:      Spouse name: Not on file    Number of children: Not on file    Years of education: Not on file    Highest education level: Not on file   Occupational History    Not on file   Tobacco Use    Smoking status: Never    Smokeless tobacco: Never   Vaping Use    Vaping Use: Never used   Substance and Sexual Activity    Alcohol use: No    Drug use: Never    Sexual activity: Not Currently   Other Topics Concern     Service Not Asked    Blood Transfusions Not Asked    Caffeine Concern Not Asked    Occupational Exposure Not Asked    Hobby Hazards Not Asked    Sleep Concern Not Asked    Stress Concern Not Asked    Weight Concern Not Asked    Special Diet Not Asked    Back Care Not Asked    Exercise Not Asked    Bike Helmet Not Asked    Seat Belt Not Asked    Self-Exams Not Asked   Social History Narrative    Not on file     Social Determinants of Health     Financial Resource Strain: Not on file   Food Insecurity: Not on file   Transportation Needs: Not on file   Physical Activity: Not on file   Stress: Not on file   Social Connections: Not on file   Intimate Partner Violence: Not on file   Housing Stability: Not on file       Past Surgical History:   Procedure Laterality Date    HX HEENT Right 09/11/2018    eye surgery, macular     HX KNEE REPLACEMENT Left 2018    HX LUMBAR LAMINECTOMY  1992    HX LUMBAR LAMINECTOMY  2000    HX ORTHOPAEDIC  06-25-12    Right foot with excision of bursa and adipose tissue from fifth metatarsal base by Dr. Kayleen Steiner Left 03/09/2022    left knee revision    HX PROSTATECTOMY  11/2018    HX ROTATOR CUFF REPAIR Right 01/28/2019    by Dr. David Preciado ARTHROSCOPY Left 12/2020    WORK RELATED INJURY       Patient seen evaluated today for his left knee. He status post revision left knee replacement. He is about 10 months out. Overall is done very well with it. He did have a fall at home however not landing on his left knee. He ended up having some left knee discomfort. He denies start up pain. No feelings of instability. Patient denies recent fevers, chills, chest pain, SOB, or injuries. No recent systemic changes noted. A 12-point review of systems is performed today. Pertinent positives are noted. All other systems reviewed and otherwise are negative. Physical exam: General: Alert and oriented x3, nad.  well-developed, well nourished. normal affect, AF. NC/AT, EOMI, neck supple, trachea midline, no JVD present. Breathing is non-labored. Examination of lower extremities reveals pain-free range of motion the hips. There is no pain to palpation the trochanter bursa. Negative straight leg raise. Negative calf tenderness. Negative Homans. No signs of DVT present. The left knee reveals skin intact. There is no erythema or ecchymosis noted. There are no signs for infection or cellulitis present. He has full range of motion. Good stability. Patella tracks nicely. There is discomfort to palpation of the pes bursa. Radiographs obtained in office today 1/13/2023 at the [facility] location including AP, lateral, skyline of the left knee shows a total knee components to be well fixed without evidence for loosening or fracture noted. Assessment: Status post revision left knee replacement, Pes bursitis left knee    Plan:  At this point, we discussed treatment options. We will move for the cortisone injection for the left knee, pes bursa. After informed consent, under aseptic conditions, with US guided assitance, the left knee was prepped with betadine and a mxiture of 1ml 1% lidocaine and 3mg of celestone was injected without complications. The patient tolerated the injection well. The patient is instructed on post-injection care. We will see him back in the office in about 3 months time for evaluation. We will send a prescription for antibiotics to the pharmacy for upcoming dental work. Chart reviewed for the following:  Jhon KO PA-C, have reviewed the History, Physical and updated the Allergic reactions for [employer]? TIME OUT performed immediately prior to start of procedure:  John KO PA-C, have performed the following reviews on [employer] prior to the start of the procedure:  ????????  * Patient was identified by name and date of birth   * Agreement on procedure being performed was verified  * Risks and Benefits explained to the patient  * Procedure site verified and marked as necessary  * Patient was positioned for comfort  * Consent was signed and verified    Time:3:26 PM    Body part: left knee, intra-bursal    Medication & Dose: 1ml 1% lidocaine and 3mg celestone    Date of procedure: 01/13/23    Procedure performed by: John White PA-C    Provider assisted by: none    Patient assisted by: self    How tolerated by patient: tolerated the procedure well with no complications    Post Procedural Pain Scale: 3    Comments:   [address] using a frequency of 10MHz with a 12L-RS transducer head was used to confirm needle placement.   Ultrasound images captured using [address] Ultrasound machine and scanned into patient's chart       Brian Mejia PA-C, ATC

## 2023-01-13 NOTE — LETTER
NOTIFICATION     1/13/2023 3:27 PM    Mr. Nicole Price  [address] [facility] [address] [facility] [address]      To Whom It May Concern:    Nicole Price is currently under the care of [address] [place] [address] Altaf Lam. He is okay to have any dental work with pre-procedure antibiotics. If there are questions or concerns please have the patient contact our office.         Sincerely,      Tommy Petersen PA-C

## 2023-01-16 ENCOUNTER — HOSPITAL ENCOUNTER (OUTPATIENT)
Dept: PHYSICAL THERAPY | Age: 68
Discharge: HOME | End: 2023-01-16
Payer: MEDICARE

## 2023-01-16 PROCEDURE — 97112 NEUROMUSCULAR REEDUCATION: CPT

## 2023-01-16 PROCEDURE — 97110 THERAPEUTIC EXERCISES: CPT

## 2023-01-16 NOTE — PROGRESS NOTES
PT DAILY TREATMENT NOTE     Patient Name: Skyler Amanda  Date:2023  : 1955  [x]  Patient  Verified  Payor: [place] MEDICARE / Plan: [place] MEDICARE PART A & B / Product Type: Medicare /    In time:4:00  Out time:4:46  Total Treatment Time (min): 36  Visit #: 4 of 8    Medicare/BCBS Only   Total Timed Codes (min):  36 1:1 Treatment Time:  24       Treatment Area: Neck pain [M54.2]    SUBJECTIVE  Pain Level (0-10 scale): 3/10 neck, 1/10 headache, 5/10 left knee   Any medication changes, allergies to medications, adverse drug reactions, diagnosis change, or new procedure performed?: [x] No    [] Yes (see summary sheet for update)  Subjective functional status/changes:   [] No changes reported  Pt reports that his knee is bothering him more than anything else today. He had a cortisone injection in his knee on Friday. It felt better on Saturday but it's feeling worse again now. He is still having headaches daily, but the headaches are less intense. He feels like he can be DC after next week.        OBJECTIVE    Modality rationale: decrease pain and increase tissue extensibility to improve the patients ability to tolerate daily tasks   Min Type Additional Details    [] Estim:  []Unatt       []IFC  []Premod                        []Other:  []w/ice   []w/heat  Position:  Location:    [] Estim: []Att    []TENS instruct  []NMES                    []Other:  []w/US   []w/ice   []w/heat  Position:  Location:    []  Traction: [] Cervical       []Lumbar                       [] Prone          []Supine                       []Intermittent   []Continuous Lbs:  [] before manual  [] after manual    []  Ultrasound: []Continuous   [] Pulsed                           []1MHz   []3MHz W/cm2:  Location:    []  Iontophoresis with dexamethasone         Location: [] Take home patch   [] In clinic   10 []  Ice     [x]  heat  []  Ice massage  []  Laser   []  Anodyne Position: supine  Location: c/s    []  Laser with stim  [] Other:  Position:  Location:    []  Vasopneumatic Device    []  Right     []  Left  Pre-treatment girth:  Post-treatment girth:  Measured at (location):  Pressure:       [] lo [] med [] hi   Temperature: [] lo [] med [] hi   [x] Skin assessment post-treatment:  [x]intact []redness- no adverse reaction    []redness - adverse reaction:     25 min Therapeutic Exercise:  [x] See flow sheet :   Rationale: increase ROM, increase strength, and increase proprioception to improve the patients ability to improve ease of household management and ADL's    11 min Neuromuscular Re-education:  [x]  See flow sheet : scapula and posture re-ed with 1/2 prone row, no money, DNF Tuck + Lift   Rationale: increase strength, improve balance, and increase proprioception  to improve the patients ability to improve ease of ADL's and household tasks         With   [x] TE   [] TA   [] neuro   [] other: Patient Education: [x] Review HEP    [] Progressed/Changed HEP based on:   [] positioning   [] body mechanics   [] transfers   [] heat/ice application    [x] other: discussed plan to transition towards HEP and DC in 3 sessions and pt was in agreement. Discussed plan to trial hold manual interventions in preparation for DC and pt was in agreement      Other Objective/Functional Measures:     Subjective information obtained during UBE  Tactile cues initially to avoid shoulder shrug with 1/2 prone rows/T's, correct form with tactile cues removed with continued reps. Improving ability with DNF Tuck + Lift      Pain Level (0-10 scale) post treatment: 2/10 neck, 1/10 headache, 5/10 left knee     ASSESSMENT/Changes in Function:     Pt is making steady progress towards updated goals in therapy. He demonstrates improving postural awareness and reports decreasing intensity of headaches. Pt reports 70% improvement since start of care.   Will continue to address remaining strength, ROM, flexibility, and postural deficits in order to reduce pain with daily tasks and improve QOL. Plan to establish updated HEP and transition towards HEP and DC within the next 3 sessions pending continued steady progress. Trial hold manual interventions in preparation for anticipated DC. Patient will continue to benefit from skilled PT services to modify and progress therapeutic interventions, address ROM deficits, address strength deficits, analyze and address soft tissue restrictions, analyze and cue movement patterns, analyze and modify body mechanics/ergonomics, assess and modify postural abnormalities, and instruct in home and community integration to attain remaining goals. Progress towards goals / Updated goals:  1. Patient will improve FOTO at least 7 points (60/100) to demonstrate improvement in functional mobility. Status at evaluation/last progress note: 59/100    2. Patient will report \"A little bit of difficulty\" when asked on the FOTO questionnaire, \"How much difficulty do you have turning to look behind you? \" To demonstrate improved ease of driving. Status at evaluation/last progress note: \"moderate difficulty\"    3. Pt will improve left cervical rotation AROM to 55* in order to improve ease of ADLs and checking blind spots while driving. Status at evaluation/last progress note: C/s rotation AROM: left 35 degrees right 47 degrees    4. Pt will report 50% improvement in symptoms since start of care in order to demonstrate improvement in QOL. Status at evaluation/last progress note: 30%  Current Status: Goal met.   Pt reports 70% improvement 1/16/23    PLAN  [x]  Upgrade activities as tolerated     [x]  Continue plan of care  []  Update interventions per flow sheet       []  Discharge due to:_  [x]  Other: Anticipated transition to final HEP and DC within the next 3 sessions pending continued steady progress      Quiana Bustamante, PT 1/16/2023  4:05 PM    Future Appointments   Date Time Provider [place]   1/17/2023  1:30 PM Rogelio Moses Angle Mclean MD [facility] BS AMB   1/20/2023  2:30 PM Yann Singh, PTA [id number]   2/6/2023  2:45 PM Jason Haro MD [facility] BS AMB   4/6/2023  3:15 PM Dayan Alcaraz MD VS BS AMB   5/18/2023  1:00 PM Christine Castorena PA-C VS BS AMB   12/28/2023  1:40 PM [facility] NURSE [place]   1/4/2024  1:40 PM CARA Piedra [address]

## 2023-01-17 ENCOUNTER — OFFICE VISIT (OUTPATIENT)
Dept: ORTHOPEDIC SURGERY | Age: 68
End: 2023-01-17
Payer: MEDICARE

## 2023-01-17 VITALS
TEMPERATURE: 97.4 F | OXYGEN SATURATION: 96 % | HEART RATE: 61 BPM | BODY MASS INDEX: 33.36 KG/M2 | HEIGHT: 70 IN | WEIGHT: 233 LBS

## 2023-01-17 DIAGNOSIS — M47.812 CERVICAL FACET JOINT SYNDROME: Primary | ICD-10-CM

## 2023-01-17 DIAGNOSIS — Z91.81 HISTORY OF RECENT FALL: ICD-10-CM

## 2023-01-17 DIAGNOSIS — Z79.01 WARFARIN ANTICOAGULATION: ICD-10-CM

## 2023-01-17 DIAGNOSIS — M48.02 CERVICAL SPINAL STENOSIS: ICD-10-CM

## 2023-01-17 DIAGNOSIS — M62.838 MUSCLE SPASM: ICD-10-CM

## 2023-01-17 PROCEDURE — G8427 DOCREV CUR MEDS BY ELIG CLIN: HCPCS | Performed by: PHYSICAL MEDICINE & REHABILITATION

## 2023-01-17 PROCEDURE — 1124F ACP DISCUSS-NO DSCNMKR DOCD: CPT | Performed by: PHYSICAL MEDICINE & REHABILITATION

## 2023-01-17 PROCEDURE — 1101F PT FALLS ASSESS-DOCD LE1/YR: CPT | Performed by: PHYSICAL MEDICINE & REHABILITATION

## 2023-01-17 PROCEDURE — G8432 DEP SCR NOT DOC, RNG: HCPCS | Performed by: PHYSICAL MEDICINE & REHABILITATION

## 2023-01-17 PROCEDURE — G8417 CALC BMI ABV UP PARAM F/U: HCPCS | Performed by: PHYSICAL MEDICINE & REHABILITATION

## 2023-01-17 PROCEDURE — 3017F COLORECTAL CA SCREEN DOC REV: CPT | Performed by: PHYSICAL MEDICINE & REHABILITATION

## 2023-01-17 PROCEDURE — G8536 NO DOC ELDER MAL SCRN: HCPCS | Performed by: PHYSICAL MEDICINE & REHABILITATION

## 2023-01-17 PROCEDURE — 99213 OFFICE O/P EST LOW 20 MIN: CPT | Performed by: PHYSICAL MEDICINE & REHABILITATION

## 2023-01-17 RX ORDER — METHYLPREDNISOLONE 4 MG/1
TABLET ORAL
Qty: 1 DOSE PACK | Refills: 0 | Status: SHIPPED | OUTPATIENT
Start: 2023-01-17

## 2023-01-17 NOTE — PROGRESS NOTES
Irvin Villaseñor presents today for   Chief Complaint   Patient presents with    Back Pain       Is someone accompanying this pt? no    Is the patient using any DME equipment during OV? no    Depression Screening:  3 most recent PHQ Screens 8/26/2022   PHQ Not Done -   Little interest or pleasure in doing things Not at all   Feeling down, depressed, irritable, or hopeless Not at all   Total Score PHQ 2 0       Learning Assessment:  Learning Assessment 2/8/2019   PRIMARY LEARNER Patient   HIGHEST LEVEL OF EDUCATION - PRIMARY LEARNER  -   BARRIERS PRIMARY LEARNER -   [address] [place] [address]    NEED -   LEARNER PREFERENCE PRIMARY DEMONSTRATION   ANSWERED BY Patient   RELATIONSHIP SELF       Abuse Screening:  Abuse Screening Questionnaire 11/6/2018   Do you ever feel afraid of your partner? N   Are you in a relationship with someone who physically or mentally threatens you? N   Is it safe for you to go home? Y       Fall Risk  Fall Risk Assessment, last 12 mths 8/26/2022   Able to walk? Yes   Fall in past 12 months? 0   Do you feel unsteady? -   Are you worried about falling -   Is TUG test greater than 12 seconds? -   Is the gait abnormal? -   Number of falls in past 12 months -   Fall with injury? -       OPIOID RISK TOOL  No flowsheet data found. Coordination of Care:  1. Have you been to the ER, urgent care clinic since your last visit? no  Hospitalized since your last visit? no    2. Have you seen or consulted any other health care providers outside of the [address] [place] [address] since your last visit? no Include any pap smears or colon screening.  no

## 2023-01-17 NOTE — LETTER
1/17/2023    Patient: Skyler Amanda   YOB: 1955   Date of Visit: 1/17/2023     Niki Case MD  [address]  [place] [address] [facility] [address]  Via Fax: [phone]    Dear Niki Case MD,      Thank you for referring Mr. Skyler Amanda to [facility] for evaluation. My notes for this consultation are attached. If you have questions, please do not hesitate to call me. I look forward to following your patient along with you.       Sincerely,    David Aguayo MD

## 2023-01-17 NOTE — PATIENT INSTRUCTIONS
Low Back Arthritis: Exercises  Introduction  Here are some examples of typical rehabilitation exercises for your condition. Start each exercise slowly. Ease off the exercise if you start to have pain. Your doctor or physical therapist will tell you when you can start these exercises and which ones will work best for you. When you are not being active, find a comfortable position for rest. Some people are comfortable on the floor or a medium-firm bed with a small pillow under their head and another under their knees. Some people prefer to lie on their side with a pillow between their knees. Don't stay in one position for too long. Take short walks (10 to 20 minutes) every 2 to 3 hours. Avoid slopes, hills, and stairs until you feel better. Walk only distances you can manage without pain, especially leg pain. How to do the exercises  Pelvic tilt  Lie on your back with your knees bent. \"Brace\" your stomach--tighten your muscles by pulling in and imagining your belly button moving toward your spine. Press your lower back into the floor. You should feel your hips and pelvis rock back. Hold for 6 seconds while breathing smoothly. Relax and allow your pelvis and hips to rock forward. Repeat 8 to 12 times. Back stretches  Get down on your hands and knees on the floor. Relax your head and allow it to droop. Round your back up toward the ceiling until you feel a nice stretch in your upper, middle, and lower back. Hold this stretch for as long as it feels comfortable, or about 15 to 30 seconds. Return to the starting position with a flat back while you are on your hands and knees. Let your back sway by pressing your stomach toward the floor. Lift your buttocks toward the ceiling. Hold this position for 15 to 30 seconds. Repeat 2 to 4 times. Follow-up care is a key part of your treatment and safety. Be sure to make and go to all appointments, and call your doctor if you are having problems.  It's also a good idea to know your test results and keep a list of the medicines you take. Current as of: March 9, 2022               Content Version: 13.4  © 2006-2022 [employer]. Care instructions adapted under license by [employer] (which disclaims liability or warranty for this information). If you have questions about a medical condition or this instruction, always ask your healthcare professional. [place] [address] any warranty or liability for your use of this information.

## 2023-01-17 NOTE — PROGRESS NOTES
[facility]  [address] [place], [facility] [address]  Phone: [phone]  Fax: [phone]    Pt's YOB: 1955    ASSESSMENT   Diagnoses and all orders for this visit:    1. Cervical facet joint syndrome  -     methylPREDNISolone (MEDROL DOSEPACK) 4 mg tablet; Per dose pack instructions    2. Cervical spinal stenosis    3. Muscle spasm    4. Warfarin anticoagulation    5. History of recent fall       IMPRESSION AND PLAN:  Keyla Cantor is a 69 y.o. male with history of cervical and lumbar pain and presents to the office today for medication follow up. Pt continues to complain of lumbar, cervical, and sciatic nerve pain with improvement since his last office visit. He is taking Skelaxin 800 mg 2 caps QHS as directed, Neurontin 100 mg 2 caps QHS as directed, and uses moist heat with relief. Les Deshaun 1) Pt was given information on low back arthritis exercises. 2) He will continue taking Neurontin 100 mg to 1 cap QAM and 2 caps QHS and Skelaxin 800 mg 2 caps QHS as directed. Refills were not required at this time. 3) A Medrol Dosepak was prescribed for acute inflammatory pain. 4) Mr. Peterson Laura has a reminder for a \"due or due soon\" health maintenance. I have asked that he contact his primary care provider, Trinh Beltran MD, for follow-up on this health maintenance. 5)  demonstrated consistency with prescribing. 6) Pt is not a candidate for NSAID's due to chronic anticoagulation with Coumadin. Follow-up and Dispositions    Return in about 3 months (around 4/17/2023) for Medication follow up. HISTORY OF PRESENT ILLNESS:  Keyla Cantor is a 69 y.o. male with history of cervical and lumbar pain and presents to the office today for medication follow up. Pt continues to complain of lumbar, cervical, and sciatic nerve pain with improvement since his last office visit.  He is followed by Dr. Keyla Salazar and recently underwent a cervical injection with questionable benefit. Pt notes he is undergoing second course of physical therapy with significant benefit and still has three more sessions remaining. He is anticipating an appointment with Dr. Umer Rose on 01/18/2023 for first injection follow up before continuing with second injection. Pt denies any numbness in the arms, weakness, or pain. He admits to a fall in 12/2022 where he tripped outside on the steps but did not incite any progressive pain. Pt continues to be followed by [facility] where he recently underwent a steroid injection in the left knee with temporary benefit. He complains of continued headaches which have lessened since his last office visit. Pt denies hx of diabetes mellitus. He remains on Coumadin. Pt is taking Skelaxin 800 mg 2 caps QHS as directed, Neurontin 100 mg 2 caps QHS as directed, uses moist heat with relief, and supplements with vitamin D and zinc with benefit. Pt at this time desires to continue with current care. Of note, pt presents to today's office visit ambulating with the assistance of a single point cane. Pain Scale: 3/10    PCP: Shilpi Forrest MD     Past Medical History:   Diagnosis Date    Arthritis     Chronic pain     knee and shoulder    DVT (deep venous thrombosis) ([facility] 1992    post sx.   R LEG    DVT (deep venous thrombosis) ([facility]) 2000    POST BACK SX. 2- LEFT LEG    GERD (gastroesophageal reflux disease)     Headache(784.0)     everyday    High cholesterol     Hypertension     Prostate cancer ([facility] 2018    Pure hypercholesterolemia     Spinal stenosis     Thromboembolus ([facility]         Social History     Socioeconomic History    Marital status:      Spouse name: Not on file    Number of children: Not on file    Years of education: Not on file    Highest education level: Not on file   Occupational History    Not on file   Tobacco Use    Smoking status: Never    Smokeless tobacco: Never   Vaping Use    Vaping Use: Never used Substance and Sexual Activity    Alcohol use: No    Drug use: Never    Sexual activity: Not Currently   Other Topics Concern     Service Not Asked    Blood Transfusions Not Asked    Caffeine Concern Not Asked    Occupational Exposure Not Asked    Hobby Hazards Not Asked    Sleep Concern Not Asked    Stress Concern Not Asked    Weight Concern Not Asked    Special Diet Not Asked    Back Care Not Asked    Exercise Not Asked    Bike Helmet Not Asked    Seat Belt Not Asked    Self-Exams Not Asked   Social History Narrative    Not on file     Social Determinants of Health     Financial Resource Strain: Not on file   Food Insecurity: Not on file   Transportation Needs: Not on file   Physical Activity: Not on file   Stress: Not on file   Social Connections: Not on file   Intimate Partner Violence: Not on file   Housing Stability: Not on file       Current Outpatient Medications   Medication Sig Dispense Refill    methylPREDNISolone (MEDROL DOSEPACK) 4 mg tablet Per dose pack instructions 1 Dose Pack 0    clindamycin (CLEOCIN) 300 mg capsule Take 3 po 1 hour prior to dental appointment 3 Capsule 2    metaxalone (SKELAXIN) 800 mg tablet TAKE 1 TABLET BY MOUTH THREE TIMES DAILY AS NEEDED FOR MUSCLE SPASM 90 Tablet 3    diclofenac (VOLTAREN) 1 % gel Apply 4 g to affected area four (4) times daily. 100 g 2    gabapentin (NEURONTIN) 100 mg capsule Take 1 in the morning and 2 capsules at bedtime as directed  Indications: neuropathic pain 270 Capsule 1    ondansetron hcl (Zofran) 4 mg tablet Take 1 Tablet by mouth every eight (8) hours as needed for Nausea or Vomiting. 20 Tablet 2    clotrimazole-betamethasone (LOTRISONE) 1-0.05 % lotion Apply  to affected area two (2) times a day. 30 mL 0    allopurinoL (ZYLOPRIM) 100 mg tablet Take 1 Tablet by mouth two (2) times a day. 60 Tablet 0    L gasseri/B bifidum/B longum ([place] PO) Take 1 Tablet by mouth nightly.       lansoprazole (PREVACID) 30 mg capsule Take 30 mg by mouth daily. warfarin (COUMADIN) 5 mg tablet TAKE 2 AND 1/2 TABLETS BY MOUTH DAILY OR AS DIRECTED (Patient taking differently: Take  by mouth daily. Patient takes 2  TABLETS BY MOUTH Daily or as directed) 75 Tab 0    lisinopril-hydroCHLOROthiazide (PRINZIDE, ZESTORETIC) 20-12.5 mg per tablet TAKE 1 TABLET BY MOUTH DAILY (Patient taking differently: Take 1 Tablet by mouth daily. TAKE 1 TABLET BY MOUTH DAILY) 90 Tab 1    labetalol (NORMODYNE) 100 mg tablet TAKE 1 TABLET BY MOUTH TWICE DAILY. STOP VERAPAMIL (Patient taking differently: Take  by mouth two (2) times a day.) 180 Tab 0    butalbital-acetaminophen-caff (FIORICET) -40 mg per capsule 2 cap three times per day as needed for headache (Patient taking differently: Take 1 Capsule by mouth daily. 2 cap three times per day as needed for headache) 180 Cap 1    ALPRAZolam (XANAX) 0.5 mg tablet Take one half(1/2) tab to one(1) tab by mouth at bedtime as needed for sleep (Patient taking differently: Take  by mouth nightly as needed.  Take one half(1/2) tab to one(1) tab by mouth at bedtime as needed for sleep) 30 Tab 0    montelukast (SINGULAIR) 10 mg tablet TAKE 1 TABLET BY MOUTH EVERY DAY 90 Tab 0    acetaminophen (TYLENOL) 500 mg tablet Take 1,000 mg by mouth every six (6) hours as needed for Pain.      diazePAM (VALIUM) 5 mg tablet diazepam 5 mg tablet (Patient not taking: No sig reported)      enoxaparin (LOVENOX) 40 mg/0.4 mL  (Patient not taking: No sig reported)      triamcinolone acetonide (KENALOG) 0.1 % topical cream APPLY 1 APPLICATION TWICE DAILY FOR ECZEMA (Patient not taking: No sig reported)         Allergies   Allergen Reactions    Amoxicillin Itching    Augmentin [Amoxicillin-Pot Clavulanate] Itching    Chlorhexidine Unknown (comments)    Chlorhexidine Towelette Itching    Hibiclens [Chlorhexidine Gluconate] Itching    Milk Containing Products Diarrhea    Nsaids (Non-Steroidal Anti-Inflammatory Drug) Other (comments)     On blood thinner, contraindicated. Penicillins Rash    Requip [Ropinirole] Nausea and Vomiting    Simvastatin Other (comments)         REVIEW OF SYSTEMS    Constitutional: Negative for fever, chills, or weight change. Respiratory: Negative for cough or shortness of breath. Cardiovascular: Negative for chest pain or palpitations. Gastrointestinal: Negative for acid reflux, change in bowel habits, or constipation. Genitourinary: Negative for dysuria and flank pain. Musculoskeletal: Positive for lumbar pain. Skin: Negative for rash. Neurological: Negative for headaches, dizziness, or numbness. Endo/Heme/Allergies: Negative for increased bruising. Psychiatric/Behavioral: Negative for difficulty with sleep. As per HPI    PHYSICAL EXAMINATION  Visit Vitals  Pulse 61   Temp 97.4 °F (36.3 °C) (Temporal)   Ht 5' 10\" (1.778 m)   Wt 233 lb (105.7 kg)   SpO2 96%   BMI 33.43 kg/m²       Constitutional: Awake, alert, and in no acute distress. Neurological: 1+ symmetrical DTRs in the upper extremities. 1+ symmetrical DTRs in the lower extremities. Sensation to light touch is intact. Negative Lepe's sign bilaterally. Skin: warm, dry, and intact. Musculoskeletal:  No pain with extension, axial loading, or forward flexion. No pain with internal or external rotation of his hips. Negative straight leg raise bilaterally. Pt ambulates with the assistance of a single point cane. Biceps  Triceps Deltoids Wrist Ext Wrist Flex Hand Intrin   Right +4/5 +4/5 +4/5 +4/5 +4/5 +4/5   Left +4/5 +4/5 +4/5 +4/5 +4/5 +4/5      Hip Flex  Quads Hamstrings Ankle DF EHL Ankle PF   Right +4/5 +4/5 +4/5 +4/5 +4/5 +4/5   Left +4/5 +4/5 +4/5 +4/5 +4/5 +4/5     IMAGING:  MRI Results (most recent):  Results from Orders Only encounter on 08/24/22    MRI CERV SPINE WO CONT    Narrative  Sagittal and axial multisequence MR images of cervical spine were obtained. HISTORY: Neck stiffness. Decreased range of motion.     Comparison December 19, 2016    Trace anterior spondylolisthesis of C4. No compression deformity. No pathologic  marrow signal except for mild discogenic endplate fatty changes at C5-C6 and  C6-C7. Soft tissues are unremarkable. No Chiari I malformation. No intrinsic  lesion in the spinal cord. C2-C3: No disc herniation, central or foraminal stenosis. Mild facet  hypertrophy. C3-C4: Posterior disc bulge, contacting spinal cord, slightly more prominent  than before but no cord compression. No significant central stenosis. No  significant foraminal stenosis. C4-C5: Posterior disc bulge, slightly contacting spinal cord with no central  stenosis. Moderate left foraminal stenosis when combined with facet hypertrophy. Patent right foramen. C5-C6: Loss of discal height with mild posterior disc bulge, slightly contacting  without compressing spinal cord. Moderate bilateral foraminal stenosis. C6-C7: Posterior disc bulge and left posterior lateral disc protrusion,  contacting spinal cord. Effaced surrounding CSF. Mild to moderate central  stenosis, grossly stable. Moderately severe bilateral foraminal stenosis,  slightly worse on the left. C7-T1: No focal disc herniation or central stenosis. No cord contact. No  foraminal stenosis. Impression  Grossly stable degenerative disc disease from C3-C4 to C6-C7, with  cord contact. Central stenosis most severe at C6-C7. Foraminal stenosis also  most severe at C6-C7. Lumbar spine MRI from 01/24/2018 was personally reviewed with the patient and demonstrated:  FINDINGS:     Prior laminotomies on the left at L4-5 and bilaterally at L5-S1. No fracture,  bone destruction, or fluid collection. Intact lordosis. Normal vertebral body heights. Small less than 5 mm low signal  focus within anterior L4, developed since 2013. No similar focus elsewhere. No  aggressive features.  Otherwise generally fatty marrow signal with some chronic  fatty endplate changes straddling L5-S1. Moderate to severe disc space narrowing  and disc desiccation of that level. Mild to moderate narrowing and desiccation  of L3-4 and L4-5. Conus at T12-L1. Axial imaging correlation:    T12-L1:  Patent canal and foramina. L1-L2: Patent canal and foramina. L2-L3: Patent canal and foramina. L3-L4: Broad-based disc osteophyte complex. Bilateral facet arthropathy with  ligamentum flavum thickening and buckling. Moderate concentric spinal stenosis. Particular narrowing of lateral recesses with transient distortion of the  crossing L4 nerves. Axial T2 image 20. Patent foramina. Progression of  degenerative findings. L4-L5: Postoperative level. Mild broad-based disc osteophyte complex with a  small amount of enhancing scar tissue. Hypertrophic facet arthropathy. Moderate  spinal stenosis. Narrowing of the lateral recesses left more than right. Transient distortion of the crossing nerves particularly left L5. Axial T2 image  13. Mild foraminal stenoses. Unchanged. L5-S1: Postoperative level. Broad-based disc osteophyte complex with enhancing  scar tissue centrally. Hypertrophic facet arthropathy. No significant spinal  stenosis. Moderately severe bilateral foraminal stenoses. Unchanged. Incidental imaging of regional soft tissues unremarkable. IMPRESSION:  1. Some progression of degenerative disc and facet disease at L3-4, with  moderate spinal stenosis and potentially impingement of the crossing L4 nerves,  left more than right. 2. Stable postsurgical and degenerative findings at L4-5 and L5-S1. Left shoulder MRI from 11/12/2021 was personally reviewed with the patient and demonstrated:  FINDINGS:     Rotator Cuff: Full-thickness tear of the supraspinatus with fluid-filled tendon defect measuring 3.7 cm in transverse dimension. Moderate infraspinatus tendinosis. Mild subscapularis tendinosis. Teres minor appears intact. Mild fatty change of the supraspinatus and infraspinatus. Subacromial Outlet: Moderate degenerative changes of the acromioclavicular joint. Moderate narrowing. Acromioclavicular joint effusion. Type 2 acromion. No os acromiale. Glenohumeral joint: No evidence of dislocation. Long Head of the Biceps Tendon: Full-thickness tear of the intra-articular portion with 8 cm of proximal tendon retraction. Moderate distension of the biceps tendon sheath. Glenoid Labrum: Maceration of the superior labrum. No paralabral cyst.      Osseous Structures: No bone contusion or Sachs-Hill deformity. Soft Tissues: Moderate joint effusion with synovitis. Moderate distension of the subacromial subdeltoid bursa. Distension of the subcoracoid bursa. IMPRESSION:      1. Full-thickness tear of the supraspinatus with fluid-filled tendon defect measuring 3.7 cm. Tendon fibers are retracted to the level of the acromioclavicular joint. Mild fatty change of the supraspinatus and infraspinatus. 2. Moderate infraspinatus and mild subscapularis tendinosis. 3. Moderate narrowing of the subacromial outlet. 4. Fill-thickness retracted tear of the intra-articular biceps tendon. Tendon fibers are retracted past the bicipital groove, at least 8 cm from the biceps anchor insertion. 5. Moderate joint effusion with synovitis. 6. Moderate distension of the subacromial subdeltoid bursa. Written by Ronaldo Rocha, as dictated by Moise Max MD.  I, Dr. Moise Max confirm that all documentation is accurate.

## 2023-01-19 ENCOUNTER — HOSPITAL ENCOUNTER (OUTPATIENT)
Dept: PHYSICAL THERAPY | Age: 68
Discharge: HOME | End: 2023-01-19
Payer: MEDICARE

## 2023-01-19 PROCEDURE — 97110 THERAPEUTIC EXERCISES: CPT

## 2023-01-19 NOTE — PROGRESS NOTES
PT DAILY TREATMENT NOTE     Patient Name: Uma Gerardo  Date:2023  : 1955  [x]  Patient  Verified  [place]or: [place] MEDICARE / Plan: [place] MEDICARE PART A & B / Product Type: Medicare /    In time: 12:04  Out time: 12:45  Total Treatment Time (min): 41  Visit #: 5 of 8    Medicare/BCBS Only   Total Timed Codes (min):  31 1:1 Treatment Time:  31       Treatment Area: Neck pain [M54.2]    SUBJECTIVE  Pain Level (0-10 scale):  3/10 headache and c/s   Any medication changes, allergies to medications, adverse drug reactions, diagnosis change, or new procedure performed?: [x] No    [] Yes (see summary sheet for update)  Subjective functional status/changes:   [] No changes reported  Patient reports 30-40% improvement since start of care but 70% improvement in posture. He was able to get his bike set up at home to start using. He feels he will be okay to D/C after remaining visits.        OBJECTIVE    Modality rationale: decrease pain and increase tissue extensibility to improve the patients ability to tolerate daily tasks   Min Type Additional Details    [] Estim:  []Unatt       []IFC  []Premod                        []Other:  []w/ice   []w/heat  Position:  Location:    [] Estim: []Att    []TENS instruct  []NMES                    []Other:  []w/US   []w/ice   []w/heat  Position:  Location:    []  Traction: [] Cervical       []Lumbar                       [] Prone          []Supine                       []Intermittent   []Continuous Lbs:  [] before manual  [] after manual    []  Ultrasound: []Continuous   [] Pulsed                           []1MHz   []3MHz W/cm2:  Location:    []  Iontophoresis with dexamethasone         Location: [] Take home patch   [] In clinic   10 []  Ice     [x]  heat  []  Ice massage  []  Laser   []  Anodyne Position: supine  Location: c/s and ice to left knee    []  Laser with stim  []  Other:  Position:  Location:    []  Vasopneumatic Device    []  Right     []  Left  Pre-treatment girth:  Post-treatment girth:  Measured at (location):  Pressure:       [] lo [] med [] hi   Temperature: [] lo [] med [] hi   [x] Skin assessment post-treatment:  [x]intact []redness- no adverse reaction    []redness - adverse reaction:     31 min Therapeutic Exercise:  [x] See flow sheet :   Rationale: increase ROM, increase strength, and increase proprioception to improve the patients ability to improve ease of household management and ADLs      With   [] TE   [] TA   [] neuro   [] other: Patient Education: [x] Review HEP    [] Progressed/Changed HEP based on:   [] positioning   [] body mechanics   [] transfers   [] heat/ice application    [] other:      Other Objective/Functional Measures:  Provided updated HEP with instruction and provided fresh GTB  Reviewed posture correction  Reducing headache during progression of session  Fatigue and slight bicep pain with no moneys  Good form with remainder of exercises    Pain Level (0-10 scale) post treatment: 2/10    ASSESSMENT/Changes in Function: Pt progressing with 70% improvement in posture and 30-40% improvement with headache symptoms and neck pain. He did well with updated HEP instruction and will likely D/C in 2 visits. He needs to continue working on postural endurance. Patient will continue to benefit from skilled PT services to modify and progress therapeutic interventions, address ROM deficits, address strength deficits, analyze and address soft tissue restrictions, analyze and cue movement patterns, analyze and modify body mechanics/ergonomics, assess and modify postural abnormalities, and instruct in home and community integration to attain remaining goals. Progress towards goals / Updated goals:  1. Patient will improve FOTO at least 7 points (60/100) to demonstrate improvement in functional mobility. Status at evaluation/last progress note: 59/100    2.  Patient will report \"A little bit of difficulty\" when asked on the FOTO questionnaire, \"How much difficulty do you have turning to look behind you? \" To demonstrate improved ease of driving. Status at evaluation/last progress note: \"moderate difficulty\"    3. Pt will improve left cervical rotation AROM to 55* in order to improve ease of ADLs and checking blind spots while driving. Status at evaluation/last progress note: C/s rotation AROM: left 35 degrees right 47 degrees    4. Pt will report 50% improvement in symptoms since start of care in order to demonstrate improvement in QOL. Status at evaluation/last progress note: 30%  Current Status: Goal met.   Pt reports 70% improvement 1/16/23    PLAN  [x]  Upgrade activities as tolerated     [x]  Continue plan of care  []  Update interventions per flow sheet       []  Discharge due to:_  []  Other:     Judith Calvillo PTA 1/19/2023  4:05 PM    Future Appointments   Date Time Provider [place]   1/19/2023 12:00 PM Reyes Jojo, PTA [facility]   1/23/2023  2:00 PM Reyes Jojo, PTA [facility]   1/26/2023  2:30 PM Reyes Jojo, PTA [facility]   2/6/2023  2:45 PM Leon Molina MD [facility] BS AMB   4/6/2023  3:15 PM Karen Jones MD [facility] BS AMB   4/17/2023  1:40 PM Leon Molina MD [facility] BS AMB   5/18/2023  1:00 PM Jaqueline Wilkinson PA-C [facility] BS AMB   12/28/2023  1:40 PM [facility] NURSE [facility] ROXANNE [address]   1/4/2024  1:40 PM Hardeep Knott, [address] [facility]

## 2023-01-20 ENCOUNTER — APPOINTMENT (OUTPATIENT)
Dept: PHYSICAL THERAPY | Age: 68
End: 2023-01-20
Payer: MEDICARE

## 2023-01-23 ENCOUNTER — HOSPITAL ENCOUNTER (OUTPATIENT)
Dept: PHYSICAL THERAPY | Age: 68
Discharge: HOME | End: 2023-01-23
Payer: MEDICARE

## 2023-01-23 PROCEDURE — 97110 THERAPEUTIC EXERCISES: CPT

## 2023-01-23 PROCEDURE — 97140 MANUAL THERAPY 1/> REGIONS: CPT

## 2023-01-23 NOTE — PROGRESS NOTES
PT DAILY TREATMENT NOTE     Patient Name: Antoine Nolan  PRDO:[id number]  : 1955  [x]  Patient  Verified  Payor: [place] MEDICARE / Plan: [place] MEDICARE PART A & B / Product Type: Medicare /    In time: 2:02  Out time: 2:49  Total Treatment Time (min): 47  Visit #: 6 of 8    Medicare/BCBS Only   Total Timed Codes (min):  37 1:1 Treatment Time:  30       Treatment Area: Neck pain [M54.2]    SUBJECTIVE  Pain Level (0-10 scale):  3/10 c/s and 4/10 headache  Any medication changes, allergies to medications, adverse drug reactions, diagnosis change, or new procedure performed?: [x] No    [] Yes (see summary sheet for update)  Subjective functional status/changes:   [] No changes reported  Pt reports increased left sided headache since . He states the medicine and heat has only helped some and he can't seem to get rid of it.  He reports no questions about his home program.     OBJECTIVE    Modality rationale: decrease pain and increase tissue extensibility to improve the patients ability to tolerate daily tasks   Min Type Additional Details    [] Estim:  []Unatt       []IFC  []Premod                        []Other:  []w/ice   []w/heat  Position:  Location:    [] Estim: []Att    []TENS instruct  []NMES                    []Other:  []w/US   []w/ice   []w/heat  Position:  Location:    []  Traction: [] Cervical       []Lumbar                       [] Prone          []Supine                       []Intermittent   []Continuous Lbs:  [] before manual  [] after manual    []  Ultrasound: []Continuous   [] Pulsed                           []1MHz   []3MHz W/cm2:  Location:    []  Iontophoresis with dexamethasone         Location: [] Take home patch   [] In clinic   10 []  Ice     [x]  heat  []  Ice massage  []  Laser   []  Anodyne Position: supine  Location: heat- c/s and ice to left knee    []  Laser with stim  []  Other:  Position:  Location:    []  Vasopneumatic Device    []  Right     []  Left  Pre-treatment girth:  Post-treatment girth:  Measured at (location):  Pressure:       [] lo [] med [] hi   Temperature: [] lo [] med [] hi   [x] Skin assessment post-treatment:  [x]intact []redness- no adverse reaction    []redness - adverse reaction:     29 min Therapeutic Exercise:  [x] See flow sheet :   Rationale: increase ROM, increase strength, and increase proprioception to improve the patients ability to improve ease of household management and ADLs    8 min Manual Therapy:  [x] See flow sheet : TPR posterior scalene   Rationale: increase ROM, increase strength, and increase proprioception to improve the patients ability to improve ease of household management and ADLs      With   [] TE   [] TA   [] neuro   [] other: Patient Education: [x] Review HEP    [] Progressed/Changed HEP based on:   [] positioning   [] body mechanics   [] transfers   [] heat/ice application    [] other:      Other Objective/Functional Measures:  TTP left c/s musculature and decreased headache post manual  Educated on heat and laying back when he gets a headache to relax neck musculature  Reviewed final HEP and pt with no questions     Pain Level (0-10 scale) post treatment: 2/10 c/s and 3/10 headache    ASSESSMENT/Changes in Function: Pt continues to get mild to moderate left cervicogenic headaches. He has hypertonicity of the left c/s scalenes. He has good understanding of his updated HEP. He needs to work on postural endurance for pain reduction and reduced forward head. Will likely D/C next visit to home program pending no set backs. Patient will continue to benefit from skilled PT services to modify and progress therapeutic interventions, address ROM deficits, address strength deficits, analyze and address soft tissue restrictions, analyze and cue movement patterns, analyze and modify body mechanics/ergonomics, assess and modify postural abnormalities, and instruct in home and community integration to attain remaining goals. Progress towards goals / Updated goals:  1. Patient will improve FOTO at least 7 points (60/100) to demonstrate improvement in functional mobility. Status at evaluation/last progress note: 59/100  2. Patient will report \"A little bit of difficulty\" when asked on the FOTO questionnaire, \"How much difficulty do you have turning to look behind you? \" To demonstrate improved ease of driving. Status at evaluation/last progress note: \"moderate difficulty\"  3. Pt will improve left cervical rotation AROM to 55* in order to improve ease of ADLs and checking blind spots while driving. Status at evaluation/last progress note: C/s rotation AROM: left 35 degrees right 47 degrees  4. Pt will report 50% improvement in symptoms since start of care in order to demonstrate improvement in QOL. Status at evaluation/last progress note: 30%  Current Status: Goal met.   Pt reports 70% improvement 1/16/23    PLAN  [x]  Upgrade activities as tolerated     [x]  Continue plan of care  []  Update interventions per flow sheet       []  Discharge due to:_  []  Other:     Sofía Tejeda PTA 1/23/2023  4:05 PM    Future Appointments   Date Time Provider Dex Alysia   1/23/2023  2:00 PM Quiana Heart [facility]   1/26/2023  2:30 PM Shahnaz Salguero PTA [facility]   2/6/2023  2:45 PM Yovani Cook MD [facility] AMB   4/6/2023  3:15 PM Alo Lazo MD [facility] BS AMB   4/17/2023  1:40 PM Yovani Cook MD [facility] BS AMB   5/18/2023  1:00 PM Eder Quezada PA-C [facility] BS AMB   12/28/2023  1:40 PM [facility] NURSE [facility] [address]   1/4/2024  1:40 PM CARA Hui [address]

## 2023-01-26 ENCOUNTER — HOSPITAL ENCOUNTER (OUTPATIENT)
Dept: PHYSICAL THERAPY | Age: 68
Discharge: HOME | End: 2023-01-26
Payer: MEDICARE

## 2023-01-26 PROCEDURE — 97530 THERAPEUTIC ACTIVITIES: CPT

## 2023-01-26 NOTE — PROGRESS NOTES
Physical Therapy Discharge Instructions      [facility] [employer] [facility]  [address] [place] [address]  [phone] fax    Patient: Blas Polanco  : 1955      Continue Home Exercise Program 3-4 days a week      Continue with    [] Ice  as needed     [x] Heat           Follow up with MD:     [] Upon completion of therapy     [x] As needed      Recommendations:     [x]   Return to activity with home program    []   Return to activity with the following modifications:       []Post Rehab Program    []Join Independent aquatic program     []Return to/join local gym        Additional Comments: Continue to monitor posture throughout the day.           Earnestine Brown, PTA 2023 4:58 PM

## 2023-01-26 NOTE — PROGRESS NOTES
PT DAILY TREATMENT NOTE     Patient Name: Andrez Mondragon  Date:2023  : 1955  [x]  Patient  Verified  Payor: [place] MEDICARE / Plan: [place] MEDICARE PART A & B / Product Type: Medicare /    In time: 2:30  Out time: 3:22  Total Treatment Time (min): 52  Visit #: 7 of 8    Medicare/BCBS Only   Total Timed Codes (min):  42 1:1 Treatment Time:  42       Treatment Area: Neck pain [M54.2]    SUBJECTIVE  Pain Level (0-10 scale):  3/10 neck; 2/10 headache  Any medication changes, allergies to medications, adverse drug reactions, diagnosis change, or new procedure performed?: [x] No    [] Yes (see summary sheet for update)  Subjective functional status/changes:   [] No changes reported  Pt reports small headache that is almost gone. He has tried pressing the tight spots and has found he can get some relief himself. He goes  to get the next injection. He is okay to D/C today and work on exercises at home.        OBJECTIVE    Modality rationale: decrease pain and increase tissue extensibility to improve the patients ability to tolerate daily tasks   Min Type Additional Details    [] Estim:  []Unatt       []IFC  []Premod                        []Other:  []w/ice   []w/heat  Position:  Location:    [] Estim: []Att    []TENS instruct  []NMES                    []Other:  []w/US   []w/ice   []w/heat  Position:  Location:    []  Traction: [] Cervical       []Lumbar                       [] Prone          []Supine                       []Intermittent   []Continuous Lbs:  [] before manual  [] after manual    []  Ultrasound: []Continuous   [] Pulsed                           []1MHz   []3MHz W/cm2:  Location:    []  Iontophoresis with dexamethasone         Location: [] Take home patch   [] In clinic   10 []  Ice     [x]  heat  []  Ice massage  []  Laser   []  Anodyne Position: supine  Location: heat- c/s    []  Laser with stim  []  Other:  Position:  Location:    []  Vasopneumatic Device    []  Right     [] Left  Pre-treatment girth:  Post-treatment girth:  Measured at (location):  Pressure:       [] lo [] med [] hi   Temperature: [] lo [] med [] hi   [x] Skin assessment post-treatment:  [x]intact []redness- no adverse reaction    []redness - adverse reaction:     37 min Therapeutic Activity:  [x] See flow sheet : goal reassessment; FOTO; HEP review   Rationale: increase ROM, increase strength, and increase proprioception to improve the patients ability to improve ease of household management and ADLs    5 min Therapeutic Exercise:  [x] See flow sheet :bike warm up   Rationale: increase ROM, increase strength, and increase proprioception to improve the patients ability to improve ease of household management and ADLs      With   [] TE   [] TA   [] neuro   [] other: Patient Education: [x] Review HEP    [] Progressed/Changed HEP based on:   [] positioning   [] body mechanics   [] transfers   [] heat/ice application    [] other:      Other Objective/Functional Measures: FOTO: 61/100  C/s AROM rotation: left 48 degrees right 50 degrees  % Improvement: 70% posture; 30-35% for headaches/pain  No questions regarding final HEP    Pain Level (0-10 scale) post treatment: 0/10 headache; 2/10 neck    ASSESSMENT/Changes in Function: Mr. Alcides Hull made good progress in therapy meeting 1/4 final goals. His FOTO significantly improved to 61/100 indicating overall improvement in functional mobility. His c/s AROM rotation increased to 48 degrees to the left and 50 degrees to the right. Overall, he subjectively reported 70% improvement in posture and 30-35% in neck/headache pain. He demonstrates decreased lower c/s and upper t/s mobility needed to further reduce forward head posture and improve postural endurance. He is compliant and understanding of his final HEP for independent progression and will discharge from skilled PT at this time. Progress towards goals / Updated goals:  1.  Patient will improve FOTO at least 7 points (60/100) to demonstrate improvement in functional mobility. Status at evaluation/last progress note: 59/100  MET  2. Patient will report \"A little bit of difficulty\" when asked on the FOTO questionnaire, \"How much difficulty do you have turning to look behind you? \" To demonstrate improved ease of driving. Status at evaluation/last progress note: \"moderate difficulty\"  Moderate difficulty  3. Pt will improve left cervical rotation AROM to 55* in order to improve ease of ADLs and checking blind spots while driving. Status at evaluation/last progress note: C/s rotation AROM: left 35 degrees right 47 degrees  C/s AROM rotation: left 48 degrees right 50 degrees (1/26/23)  4. Pt will report 50% improvement in symptoms since start of care in order to demonstrate improvement in QOL. Status at evaluation/last progress note: 30%  Current Status: Goal met. Pt reports 70% improvement 1/16/23  Progressing 30-35% for pain; 70% for posture (1/26/23)    PLAN  []  Upgrade activities as tolerated     []  Continue plan of care  []  Update interventions per flow sheet       [x]  Discharge due to: Independent with final HEP.   []  Other:     Lamont Lentz PTA 1/26/2023  4:05 PM    Future Appointments   Date Time Provider Dex Hazeli   1/26/2023  2:30 PM [facility]   2/6/2023  2:45 PM Ravin Negrete MD [facility] BS AMB   4/6/2023  3:15 PM Trisha Peterson MD [facility] BS AMB   4/17/2023  1:40 PM Ravin Negrete MD [facility] BS AMB   5/18/2023  1:00 PM Lizeth Aguilar PA-C [facility] BS AMB   12/28/2023  1:40 PM [facility] NURSE [facility] [address]   1/4/2024  1:40 PM CARA Garcia [address]

## 2023-02-03 NOTE — THERAPY DISCHARGE
[facility] - [facility]  [address] [facility]  [phone] fax    Physical Therapy Discharge Summary    Patient name: Mariela Randle Start of Care: 11/3/22   Referral source: Serena Garduno MD : 1955   Medical/Treatment Diagnosis: Neck pain [M54.2]  Payor: Brad Ogo / Plan: [place] MEDICARE PART A & B / Product Type: Medicare /  Onset Date:long history with progressive worsening      Prior Hospitalization: see medical history Provider#: [id number]   Medications: Verified on Patient Summary List    Comorbidities: HTN, arthritis, hearing impaired, hx of DVT, hx of prostate cancer, spinal stenosis, hx of B RTC repair    Prior Level of Function: camping, hiking, fishing, retired, [facility] for [employer], [place] with household negotiation, right-handed    Visits from [place] of Care: 23    Missed Visits: 1    Reporting Period : 22 to 23    Summary of Care:  1. Patient will improve FOTO at least 7 points (60/100) to demonstrate improvement in functional mobility. Status at evaluation/last progress note: 59/100  Current Status: Goal met     2. Patient will report \"A little bit of difficulty\" when asked on the FOTO questionnaire, \"How much difficulty do you have turning to look behind you? \" To demonstrate improved ease of driving. Status at evaluation/last progress note: \"moderate difficulty\"  Current Status:  Not met. Moderate difficulty reported     3. Pt will improve left cervical rotation AROM to 55* in order to improve ease of ADLs and checking blind spots while driving. Status at evaluation/last progress note: C/s rotation AROM: left 35 degrees right 47 degrees  Current Status:  Progressing. Cervical AROM rotation: left 48*, right 50*     4. Pt will report 50% improvement in symptoms since start of care in order to demonstrate improvement in QOL.   Status at evaluation/last progress note: 30%  Current Status: Progressing 30-35% for pain; 70% for posture        ASSESSMENT/RECOMMENDATIONS:  Pt has made slow, steady progress in therapy, meeting 1/4 final goals. He reports 70% improvement with posture and postural awareness and 30-35% improvement with pain since start of care. Cervical rotation AROM is improving, and significant improvement in FOTO score is indicative of functional improvement. Pt's continued forward posture is likely contributing to continued headaches and pain. Pt is compliant with final HEP and is DC at this time as skilled intervention is no longer required.      [x]Discontinue therapy: [x]Patient has reached or is progressing toward set goals      []Patient is non-compliant or has abdicated      []Due to lack of appreciable progress towards set goals    Amberly Carl, PT 2/3/2023 3:18 PM

## 2023-02-06 ENCOUNTER — OFFICE VISIT (OUTPATIENT)
Dept: ORTHOPEDIC SURGERY | Age: 68
End: 2023-02-06
Payer: OTHER MISCELLANEOUS

## 2023-02-06 VITALS
WEIGHT: 233 LBS | BODY MASS INDEX: 33.36 KG/M2 | HEIGHT: 70 IN | RESPIRATION RATE: 16 BRPM | TEMPERATURE: 96.9 F | HEART RATE: 75 BPM | OXYGEN SATURATION: 90 % | SYSTOLIC BLOOD PRESSURE: 133 MMHG | DIASTOLIC BLOOD PRESSURE: 75 MMHG

## 2023-02-06 DIAGNOSIS — M51.36 DDD (DEGENERATIVE DISC DISEASE), LUMBAR: Primary | ICD-10-CM

## 2023-02-06 DIAGNOSIS — Z79.01 WARFARIN ANTICOAGULATION: ICD-10-CM

## 2023-02-06 DIAGNOSIS — G62.9 NEUROPATHY: ICD-10-CM

## 2023-02-06 DIAGNOSIS — M62.838 MUSCLE SPASM: ICD-10-CM

## 2023-02-06 DIAGNOSIS — M48.061 SPINAL STENOSIS OF LUMBAR REGION WITHOUT NEUROGENIC CLAUDICATION: ICD-10-CM

## 2023-02-06 DIAGNOSIS — M47.816 FACET ARTHROPATHY, LUMBAR: ICD-10-CM

## 2023-02-06 RX ORDER — GABAPENTIN 100 MG/1
CAPSULE ORAL
Qty: 270 CAPSULE | Refills: 1 | Status: SHIPPED | OUTPATIENT
Start: 2023-05-01

## 2023-02-06 NOTE — PROGRESS NOTES
[facility]  [address], [facility] [address]  Phone: [phone]  Fax: [phone]    Pt's YOB: 1955    ASSESSMENT   Diagnoses and all orders for this visit:    1. DDD (degenerative disc disease), lumbar    2. Spinal stenosis of lumbar region without neurogenic claudication  -     gabapentin (NEURONTIN) 100 mg capsule; Take 1 in the morning and 2 capsules at bedtime as directed  Indications: neuropathic pain    3. Muscle spasm    4. Facet arthropathy, lumbar    5. Neuropathy  -     gabapentin (NEURONTIN) 100 mg capsule; Take 1 in the morning and 2 capsules at bedtime as directed  Indications: neuropathic pain    6. Warfarin anticoagulation       IMPRESSION AND PLAN:  Jem Nieves is a 69 y.o. male with history of cervical and lumbar pain and presents to the office today for medication follow up. Pt continues to complain of lumbar, cervical, and sciatic nerve pain, unchanged since his last office visit. He is taking Skelaxin 800 mg 2 caps QHS as directed, Neurontin 100 mg 2 caps QHS as directed, uses moist heat with relief, and supplements with vitamin D and zinc with benefit. 1) Pt was given information on lumbar arthritis exercises. 2) He received refills of Neurontin 100 mg to 1 cap QAM and 2 caps QHS at this time. 3) Pt will continue taking Skelaxin 800 mg 2 caps QHS as directed and does not require refills at this time. 4) Mr. Sisi Perez has a reminder for a \"due or due soon\" health maintenance. I have asked that he contact his primary care provider, Lolita Alvarez MD, for follow-up on this health maintenance. 5)  demonstrated consistency with prescribing. 6) Pt is not a candidate for NSAID's due to chronic anticoagulation with Coumadin. Follow-up and Dispositions    Return in about 4 months (around 6/6/2023) for Medication follow up.              HISTORY OF PRESENT ILLNESS:  Jem Nieves is a 69 y.o. male with history of cervical and lumbar pain and presents to the office today for medication follow up. Pt continues to complain of lumbar, cervical, and sciatic nerve pain, unchanged since his last office visit. He reports improvement in his lumbar symptoms with only 1-2 flare ups that last up to two days and are manageable. Pt is followed by Dr. Rodney Leahy and recently underwent a cervical injection without benefit. He notes he is anticipating another cervical injection in the upcoming weeks. Pt continues to be followed by PA Severa Due where he recently underwent a steroid injection in the left knee with temporary benefit. Pt denies hx of diabetes mellitus and remains on Coumadin. He is taking Skelaxin 800 mg 2 caps QHS as directed, Neurontin 100 mg 2 caps QHS as directed, uses moist heat with relief, and supplements with vitamin D and zinc with benefit. Pt at this time desires to continue with current care. Of note, pt presents to today's office visit ambulating with the assistance of a single point  cane. Pain Scale: /10    PCP: Silverio Salinas MD     Past Medical History:   Diagnosis Date    Arthritis     Chronic pain     knee and shoulder    DVT (deep venous thrombosis) ([facility] [address] 1992    post sx.   R LEG    DVT (deep venous thrombosis) ([facility]) 2000    POST BACK SX. 2- LEFT LEG    GERD (gastroesophageal reflux disease)     Headache(784.0)     everyday    High cholesterol     Hypertension     Prostate cancer ([facility] [address] 2018    Pure hypercholesterolemia     Spinal stenosis     Thromboembolus ([facility] [address]         Social History     Socioeconomic History    Marital status:      Spouse name: Not on file    Number of children: Not on file    Years of education: Not on file    Highest education level: Not on file   Occupational History    Not on file   Tobacco Use    Smoking status: Never    Smokeless tobacco: Never   Vaping Use    Vaping Use: Never used   Substance and Sexual Activity    Alcohol use: No    Drug use: Never    Sexual activity: Not Currently   Other Topics Concern     Service Not Asked    Blood Transfusions Not Asked    Caffeine Concern Not Asked    Occupational Exposure Not Asked    Hobby Hazards Not Asked    Sleep Concern Not Asked    Stress Concern Not Asked    Weight Concern Not Asked    Special Diet Not Asked    Back Care Not Asked    Exercise Not Asked    Bike Helmet Not Asked    Seat Belt Not Asked    Self-Exams Not Asked   Social History Narrative    Not on file     Social Determinants of Health     Financial Resource Strain: Not on file   Food Insecurity: Not on file   Transportation Needs: Not on file   Physical Activity: Not on file   Stress: Not on file   Social Connections: Not on file   Intimate Partner Violence: Not on file   Housing Stability: Not on file       Current Outpatient Medications   Medication Sig Dispense Refill    [START ON 5/1/2023] gabapentin (NEURONTIN) 100 mg capsule Take 1 in the morning and 2 capsules at bedtime as directed  Indications: neuropathic pain 270 Capsule 1    methylPREDNISolone (MEDROL DOSEPACK) 4 mg tablet Per dose pack instructions 1 Dose Pack 0    diazePAM (VALIUM) 5 mg tablet diazepam 5 mg tablet      enoxaparin (LOVENOX) 40 mg/0.4 mL       triamcinolone acetonide (KENALOG) 0.1 % topical cream APPLY 1 APPLICATION TWICE DAILY FOR ECZEMA      metaxalone (SKELAXIN) 800 mg tablet TAKE 1 TABLET BY MOUTH THREE TIMES DAILY AS NEEDED FOR MUSCLE SPASM 90 Tablet 3    diclofenac (VOLTAREN) 1 % gel Apply 4 g to affected area four (4) times daily. 100 g 2    ondansetron hcl (Zofran) 4 mg tablet Take 1 Tablet by mouth every eight (8) hours as needed for Nausea or Vomiting. 20 Tablet 2    clotrimazole-betamethasone (LOTRISONE) 1-0.05 % lotion Apply  to affected area two (2) times a day. 30 mL 0    allopurinoL (ZYLOPRIM) 100 mg tablet Take 1 Tablet by mouth two (2) times a day. 60 Tablet 0    L gasseri/B bifidum/B longum ([employer] PO) Take 1 Tablet by mouth nightly. lansoprazole (PREVACID) 30 mg capsule Take 30 mg by mouth daily. warfarin (COUMADIN) 5 mg tablet TAKE 2 AND 1/2 TABLETS BY MOUTH DAILY OR AS DIRECTED (Patient taking differently: Take  by mouth daily. Patient takes 2  TABLETS BY MOUTH Daily or as directed) 75 Tab 0    lisinopril-hydroCHLOROthiazide (PRINZIDE, ZESTORETIC) 20-12.5 mg per tablet TAKE 1 TABLET BY MOUTH DAILY (Patient taking differently: Take 1 Tablet by mouth daily. TAKE 1 TABLET BY MOUTH DAILY) 90 Tab 1    labetalol (NORMODYNE) 100 mg tablet TAKE 1 TABLET BY MOUTH TWICE DAILY. STOP VERAPAMIL (Patient taking differently: Take  by mouth two (2) times a day.) 180 Tab 0    butalbital-acetaminophen-caff (FIORICET) -40 mg per capsule 2 cap three times per day as needed for headache (Patient taking differently: Take 1 Capsule by mouth daily. 2 cap three times per day as needed for headache) 180 Cap 1    ALPRAZolam (XANAX) 0.5 mg tablet Take one half(1/2) tab to one(1) tab by mouth at bedtime as needed for sleep (Patient taking differently: Take  by mouth nightly as needed. Take one half(1/2) tab to one(1) tab by mouth at bedtime as needed for sleep) 30 Tab 0    montelukast (SINGULAIR) 10 mg tablet TAKE 1 TABLET BY MOUTH EVERY DAY 90 Tab 0    acetaminophen (TYLENOL) 500 mg tablet Take 1,000 mg by mouth every six (6) hours as needed for Pain. clindamycin (CLEOCIN) 300 mg capsule Take 3 po 1 hour prior to dental appointment (Patient not taking: Reported on 2/6/2023) 3 Capsule 2       Allergies   Allergen Reactions    Amoxicillin Itching    Augmentin [Amoxicillin-Pot Clavulanate] Itching    Chlorhexidine Unknown (comments)    Chlorhexidine Towelette Itching    Hibiclens [Chlorhexidine Gluconate] Itching    Milk Containing Products Diarrhea    Nsaids (Non-Steroidal Anti-Inflammatory Drug) Other (comments)     On blood thinner, contraindicated.      Penicillins Rash    Requip [Ropinirole] Nausea and Vomiting    Simvastatin Other (comments) REVIEW OF SYSTEMS    Constitutional: Negative for fever, chills, or weight change. Respiratory: Negative for cough or shortness of breath. Cardiovascular: Negative for chest pain or palpitations. Gastrointestinal: Negative for acid reflux, change in bowel habits, or constipation. Genitourinary: Negative for dysuria and flank pain. Musculoskeletal: Positive for lumbar pain. Skin: Negative for rash. Neurological: Negative for headaches, dizziness, or numbness. Endo/Heme/Allergies: Negative for increased bruising. Psychiatric/Behavioral: Negative for difficulty with sleep. As per HPI    PHYSICAL EXAMINATION  Visit Vitals  /75 (BP 1 Location: Left upper arm, BP Patient Position: Sitting, BP Cuff Size: Adult long)   Pulse 75   Temp 96.9 °F (36.1 °C) (Temporal)   Resp 16   Ht 5' 10\" (1.778 m)   Wt 233 lb (105.7 kg)   SpO2 90%   BMI 33.43 kg/m²       Constitutional: Awake, alert, and in no acute distress. Neurological: 1+ symmetrical DTRs in the upper extremities. 1+ symmetrical DTRs in the lower extremities. Sensation to light touch is intact. Negative Lepe's sign bilaterally. Skin: warm, dry, and intact. Musculoskeletal: No pain with extension, axial loading, or forward flexion. No pain with internal or external rotation of his hips. Negative straight leg raise bilaterally. Pt ambulates with the assistance of a single point cane. Biceps  Triceps Deltoids Wrist Ext Wrist Flex Hand Intrin   Right +4/5 +4/5 +4/5 +4/5 +4/5 +4/5   Left +4/5 +4/5 +4/5 +4/5 +4/5 +4/5      Hip Flex  Quads Hamstrings Ankle DF EHL Ankle PF   Right +4/5 +4/5 +4/5 +4/5 +4/5 +4/5   Left +4/5 +4/5 +4/5 +4/5 +4/5 +4/5     IMAGING:    Cervical spine MRI from 08/24/2022 was personally reviewed with the patient and demonstrated:   Narrative  Sagittal and axial multisequence MR images of cervical spine were obtained. HISTORY: Neck stiffness. Decreased range of motion.      Comparison December 19, 2016 Trace anterior spondylolisthesis of C4. No compression deformity. No pathologic  marrow signal except for mild discogenic endplate fatty changes at C5-C6 and  C6-C7. Soft tissues are unremarkable. No Chiari I malformation. No intrinsic  lesion in the spinal cord. C2-C3: No disc herniation, central or foraminal stenosis. Mild facet  hypertrophy. C3-C4: Posterior disc bulge, contacting spinal cord, slightly more prominent  than before but no cord compression. No significant central stenosis. No  significant foraminal stenosis. C4-C5: Posterior disc bulge, slightly contacting spinal cord with no central  stenosis. Moderate left foraminal stenosis when combined with facet hypertrophy. Patent right foramen. C5-C6: Loss of discal height with mild posterior disc bulge, slightly contacting  without compressing spinal cord. Moderate bilateral foraminal stenosis. C6-C7: Posterior disc bulge and left posterior lateral disc protrusion,  contacting spinal cord. Effaced surrounding CSF. Mild to moderate central  stenosis, grossly stable. Moderately severe bilateral foraminal stenosis,  slightly worse on the left. C7-T1: No focal disc herniation or central stenosis. No cord contact. No  foraminal stenosis. Impression  Grossly stable degenerative disc disease from C3-C4 to C6-C7, with  cord contact. Central stenosis most severe at C6-C7. Foraminal stenosis also  most severe at C6-C7. Lumbar spine MRI from 01/24/2018 was personally reviewed with the patient and demonstrated:  FINDINGS:     Prior laminotomies on the left at L4-5 and bilaterally at L5-S1. No fracture,  bone destruction, or fluid collection. Intact lordosis. Normal vertebral body heights. Small less than 5 mm low signal  focus within anterior L4, developed since 2013. No similar focus elsewhere. No  aggressive features. Otherwise generally fatty marrow signal with some chronic  fatty endplate changes straddling L5-S1.  Moderate to severe disc space narrowing  and disc desiccation of that level. Mild to moderate narrowing and desiccation  of L3-4 and L4-5. Conus at T12-L1. Axial imaging correlation:    T12-L1:  Patent canal and foramina. L1-L2: Patent canal and foramina. L2-L3: Patent canal and foramina. L3-L4: Broad-based disc osteophyte complex. Bilateral facet arthropathy with  ligamentum flavum thickening and buckling. Moderate concentric spinal stenosis. Particular narrowing of lateral recesses with transient distortion of the  crossing L4 nerves. Axial T2 image 20. Patent foramina. Progression of  degenerative findings. L4-L5: Postoperative level. Mild broad-based disc osteophyte complex with a  small amount of enhancing scar tissue. Hypertrophic facet arthropathy. Moderate  spinal stenosis. Narrowing of the lateral recesses left more than right. Transient distortion of the crossing nerves particularly left L5. Axial T2 image  13. Mild foraminal stenoses. Unchanged. L5-S1: Postoperative level. Broad-based disc osteophyte complex with enhancing  scar tissue centrally. Hypertrophic facet arthropathy. No significant spinal  stenosis. Moderately severe bilateral foraminal stenoses. Unchanged. Incidental imaging of regional soft tissues unremarkable. IMPRESSION:  1. Some progression of degenerative disc and facet disease at L3-4, with  moderate spinal stenosis and potentially impingement of the crossing L4 nerves,  left more than right. 2. Stable postsurgical and degenerative findings at L4-5 and L5-S1. Left shoulder MRI from 11/12/2021 was personally reviewed with the patient and demonstrated:  FINDINGS:     Rotator Cuff: Full-thickness tear of the supraspinatus with fluid-filled tendon defect measuring 3.7 cm in transverse dimension. Moderate infraspinatus tendinosis. Mild subscapularis tendinosis. Teres minor appears intact. Mild fatty change of the supraspinatus and infraspinatus.       Subacromial Outlet: Moderate degenerative changes of the acromioclavicular joint. Moderate narrowing. Acromioclavicular joint effusion. Type 2 acromion. No os acromiale. Glenohumeral joint: No evidence of dislocation. Long Head of the Biceps Tendon: Full-thickness tear of the intra-articular portion with 8 cm of proximal tendon retraction. Moderate distension of the biceps tendon sheath. Glenoid Labrum: Maceration of the superior labrum. No paralabral cyst.      Osseous Structures: No bone contusion or Sachs-Hill deformity. Soft Tissues: Moderate joint effusion with synovitis. Moderate distension of the subacromial subdeltoid bursa. Distension of the subcoracoid bursa. IMPRESSION:      1. Full-thickness tear of the supraspinatus with fluid-filled tendon defect measuring 3.7 cm. Tendon fibers are retracted to the level of the acromioclavicular joint. Mild fatty change of the supraspinatus and infraspinatus. 2. Moderate infraspinatus and mild subscapularis tendinosis. 3. Moderate narrowing of the subacromial outlet. 4. Fill-thickness retracted tear of the intra-articular biceps tendon. Tendon fibers are retracted past the bicipital groove, at least 8 cm from the biceps anchor insertion. 5. Moderate joint effusion with synovitis. 6. Moderate distension of the subacromial subdeltoid bursa. Written by Liudmila Patel, as dictated by Bob President, MD.  I, Dr. Rojas President confirm that all documentation is accurate.

## 2023-02-06 NOTE — PROGRESS NOTES
No chief complaint on file. Pt preferred language for health care discussion is english. Is someone accompanying this pt? no    Is the patient using any DME equipment during OV? no    Depression Screening:  3 most recent [facility] Screens 8/26/2022 7/5/2022 4/20/2022 1/11/2022 1/5/2022 12/1/2021 9/10/2021   PHQ Not Done - - - - - - -   Little interest or pleasure in doing things Not at all Not at all Not at all Not at all Not at all Not at all Not at all   Feeling down, depressed, irritable, or hopeless Not at all Not at all Not at all Not at all Not at all Not at all Not at all   Total Score PHQ 2 0 0 0 0 0 0 0       Learning Assessment:  Learning Assessment 2/8/2019 11/6/2018 9/25/2015   PRIMARY LEARNER Patient Patient Patient   HIGHEST LEVEL OF EDUCATION - PRIMARY LEARNER  - GRADUATED HIGH SCHOOL OR GED GRADUATED [address] PRIMARY LEARNER - [address] [facility] CAREG[facility] - No No   PRIMARY [facility] [address]    NEED - - No   LEARNER PREFERENCE PRIMARY DEMONSTRATION DEMONSTRATION DEMONSTRATION   ANSWERED BY Patient patient patient   RELATIONSHIP SELF SELF SELF       Abuse Screening:  Abuse Screening Questionnaire 11/6/2018 7/3/2018 2/15/2016   Do you ever feel afraid of your partner? N N N   Are you in a relationship with someone who physically or mentally threatens you? N N N   Is it safe for you to go home? Y Y Y       Fall Risk  Fall Risk Assessment, last 12 mths 8/26/2022   Able to walk? Yes   Fall in past 12 months? 0   Do you feel unsteady? -   Are you worried about falling -   Is TUG test greater than 12 seconds? -   Is the gait abnormal? -   Number of falls in past 12 months -   Fall with injury? -           Advance Directive:  1. Do you have an advance directive in place? Patient Reply:no    2. If not, would you like material regarding how to put one in place? Patient Reply: no    2. Per patient no changes to their ACP contact no.       Coordination of Care:  1. Have you been to the ER, urgent care clinic since your last visit? Hospitalized since your last visit? no    2. Have you seen or consulted any other health care providers outside of the [address] [place] [address] since your last visit? Include any pap smears or colon screening.  no

## 2023-02-06 NOTE — LETTER
2/6/2023    Patient: Cherie Clayton   YOB: 1955   Date of Visit: 2/6/2023     Elaine Morales MD  [address]  formerly [facility] [address]  Via Fax: [phone]    Dear Elaine Morales MD,      Thank you for referring Mr. Cherie Clayton to [facility] for evaluation. My notes for this consultation are attached. If you have questions, please do not hesitate to call me. I look forward to following your patient along with you.       Sincerely,    Michael Mchugh MD

## 2023-02-06 NOTE — PATIENT INSTRUCTIONS
Low Back Arthritis: Exercises  Introduction  Here are some examples of typical rehabilitation exercises for your condition. Start each exercise slowly. Ease off the exercise if you start to have pain. Your doctor or physical therapist will tell you when you can start these exercises and which ones will work best for you. When you are not being active, find a comfortable position for rest. Some people are comfortable on the floor or a medium-firm bed with a small pillow under their head and another under their knees. Some people prefer to lie on their side with a pillow between their knees. Don't stay in one position for too long. Take short walks (10 to 20 minutes) every 2 to 3 hours. Avoid slopes, hills, and stairs until you feel better. Walk only distances you can manage without pain, especially leg pain. How to do the exercises  Pelvic tilt  Lie on your back with your knees bent. \"Brace\" your stomach--tighten your muscles by pulling in and imagining your belly button moving toward your spine. Press your lower back into the floor. You should feel your hips and pelvis rock back. Hold for 6 seconds while breathing smoothly. Relax and allow your pelvis and hips to rock forward. Repeat 8 to 12 times. Back stretches  Get down on your hands and knees on the floor. Relax your head and allow it to droop. Round your back up toward the ceiling until you feel a nice stretch in your upper, middle, and lower back. Hold this stretch for as long as it feels comfortable, or about 15 to 30 seconds. Return to the starting position with a flat back while you are on your hands and knees. Let your back sway by pressing your stomach toward the floor. Lift your buttocks toward the ceiling. Hold this position for 15 to 30 seconds. Repeat 2 to 4 times. Follow-up care is a key part of your treatment and safety. Be sure to make and go to all appointments, and call your doctor if you are having problems.  It's also a good idea to know your test results and keep a list of the medicines you take. Current as of: March 9, 2022               Content Version: 13.4  © 2006-2022 [employer]. Care instructions adapted under license by [employer] (which disclaims liability or warranty for this information). If you have questions about a medical condition or this instruction, always ask your healthcare professional. [place] [address] any warranty or liability for your use of this information.

## 2023-02-21 ENCOUNTER — TELEPHONE (OUTPATIENT)
Age: 68
End: 2023-02-21

## 2023-02-21 DIAGNOSIS — Z96.652 PRESENCE OF LEFT ARTIFICIAL KNEE JOINT: ICD-10-CM

## 2023-02-21 DIAGNOSIS — Z86.718 PERSONAL HISTORY OF VENOUS THROMBOSIS AND EMBOLISM: Primary | ICD-10-CM

## 2023-02-23 ENCOUNTER — OFFICE VISIT (OUTPATIENT)
Age: 68
End: 2023-02-23
Payer: MEDICARE

## 2023-02-23 ENCOUNTER — HOSPITAL ENCOUNTER (OUTPATIENT)
Facility: HOSPITAL | Age: 68
End: 2023-02-23
Payer: MEDICARE

## 2023-02-23 ENCOUNTER — HOSPITAL ENCOUNTER (OUTPATIENT)
Facility: HOSPITAL | Age: 68
Discharge: HOME | End: 2023-02-25
Payer: MEDICARE

## 2023-02-23 VITALS — BODY MASS INDEX: 33.36 KG/M2 | HEIGHT: 70 IN | WEIGHT: 233 LBS

## 2023-02-23 DIAGNOSIS — Z86.718 PERSONAL HISTORY OF VENOUS THROMBOSIS AND EMBOLISM: ICD-10-CM

## 2023-02-23 DIAGNOSIS — M25.562 LEFT KNEE PAIN, UNSPECIFIED CHRONICITY: ICD-10-CM

## 2023-02-23 DIAGNOSIS — Z96.652 PRESENCE OF LEFT ARTIFICIAL KNEE JOINT: Primary | ICD-10-CM

## 2023-02-23 LAB
CRP SERPL-MCNC: 1 MG/DL (ref 0–0.3)
ERYTHROCYTE [DISTWIDTH] IN BLOOD BY AUTOMATED COUNT: 12.7 % (ref 11.6–14.5)
ERYTHROCYTE [SEDIMENTATION RATE] IN BLOOD: 16 MM/HR (ref 0–20)
HCT VFR BLD AUTO: 40.7 % (ref 36–48)
HGB BLD-MCNC: 13.5 G/DL (ref 13–16)
INR PPP: 1.5 (ref 0.8–1.2)
MCH RBC QN AUTO: 32.5 PG (ref 24–34)
MCHC RBC AUTO-ENTMCNC: 33.2 G/DL (ref 31–37)
MCV RBC AUTO: 97.8 FL (ref 78–100)
NRBC # BLD: 0 K/UL (ref 0–0.01)
NRBC BLD-RTO: 0 PER 100 WBC
PLATELET # BLD AUTO: 203 K/UL (ref 135–420)
PMV BLD AUTO: 10.9 FL (ref 9.2–11.8)
PROTHROMBIN TIME: 18.4 SEC (ref 11.5–15.2)
RBC # BLD AUTO: 4.16 M/UL (ref 4.35–5.65)
URATE SERPL-MCNC: 7 MG/DL (ref 2.6–7.2)
WBC # BLD AUTO: 6.9 K/UL (ref 4.6–13.2)

## 2023-02-23 PROCEDURE — 93971 EXTREMITY STUDY: CPT

## 2023-02-23 PROCEDURE — G8428 CUR MEDS NOT DOCUMENT: HCPCS | Performed by: PHYSICIAN ASSISTANT

## 2023-02-23 PROCEDURE — 1036F TOBACCO NON-USER: CPT | Performed by: PHYSICIAN ASSISTANT

## 2023-02-23 PROCEDURE — 85027 COMPLETE CBC AUTOMATED: CPT

## 2023-02-23 PROCEDURE — G8484 FLU IMMUNIZE NO ADMIN: HCPCS | Performed by: PHYSICIAN ASSISTANT

## 2023-02-23 PROCEDURE — 86140 C-REACTIVE PROTEIN: CPT

## 2023-02-23 PROCEDURE — 99214 OFFICE O/P EST MOD 30 MIN: CPT | Performed by: PHYSICIAN ASSISTANT

## 2023-02-23 PROCEDURE — G8417 CALC BMI ABV UP PARAM F/U: HCPCS | Performed by: PHYSICIAN ASSISTANT

## 2023-02-23 PROCEDURE — 85652 RBC SED RATE AUTOMATED: CPT

## 2023-02-23 PROCEDURE — 85610 PROTHROMBIN TIME: CPT

## 2023-02-23 PROCEDURE — 1123F ACP DISCUSS/DSCN MKR DOCD: CPT | Performed by: PHYSICIAN ASSISTANT

## 2023-02-23 PROCEDURE — 36415 COLL VENOUS BLD VENIPUNCTURE: CPT

## 2023-02-23 PROCEDURE — 3017F COLORECTAL CA SCREEN DOC REV: CPT | Performed by: PHYSICIAN ASSISTANT

## 2023-02-23 PROCEDURE — 84550 ASSAY OF BLOOD/URIC ACID: CPT

## 2023-02-23 PROCEDURE — 83529 ASAY OF INTERLEUKIN-6 (IL-6): CPT

## 2023-02-23 NOTE — PROGRESS NOTES
Patient: Araseli Santos                MRN: [id number]       SSN: [id number]  YOB: 1955        AGE: 69 y.o. SEX: male  Body mass index is 33.43 kg/m². PCP: Kelley Hsu MD  02/23/23      This office note has been dictated. REVIEW OF SYSTEMS:  Constitutional: Negative for fever, chills, weight loss and malaise/fatigue. HENT: Negative. Eyes: Negative. Respiratory: Negative. Cardiovascular: Negative. Gastrointestinal: No bowel incontinence or constipation. Genitourinary: No bladder incontinence or saddle anesthesia. Skin: Negative. Neurological: Negative. Endo/Heme/Allergies: Negative. Psychiatric/Behavioral: Negative. Musculoskeletal: As per HPI above. Past Medical History:   Diagnosis Date    Arthritis     Chronic pain     knee and shoulder    DVT (deep venous thrombosis) ([facility]) 2000    POST BACK SX. 2- LEFT LEG    DVT (deep venous thrombosis) ([facility] [address] 1992    post sx.   R LEG    GERD (gastroesophageal reflux disease)     Headache(784.0)     everyday    High cholesterol     Hypertension     Prostate cancer ([facility] [address] 2018    Pure hypercholesterolemia     Spinal stenosis     Thromboembolus ([facility])          Current Outpatient Medications:     acetaminophen (TYLENOL) 500 MG tablet, Take 1,000 mg by mouth every 6 hours as needed, Disp: , Rfl:     allopurinol (ZYLOPRIM) 100 MG tablet, Take 100 mg by mouth 2 times daily, Disp: , Rfl:     ALPRAZolam (XANAX) 0.5 MG tablet, [The details of the medication are not available because there are pending changes by a home health clinician.], Disp: , Rfl:     butalbital-APAP-caffeine -40 MG CAPS per capsule, [The details of the medication are not available because there are pending changes by a home health clinician.], Disp: , Rfl:     clotrimazole-betamethasone (LOTRISONE) 1-0.05 % lotion, Apply topically 2 times daily, Disp: , Rfl:     diclofenac sodium (VOLTAREN) 1 % GEL, Apply 4 g topically 4 times daily, Disp: , Rfl: gabapentin (NEURONTIN) 100 MG capsule, Take 1 in the morning and 2 capsules at bedtime as directed  Indications: neuropathic pain, Disp: , Rfl:     labetalol (NORMODYNE) 100 MG tablet, [The details of the medication are not available because there are pending changes by a home health clinician.], Disp: , Rfl:     lansoprazole (PREVACID) 30 MG delayed release capsule, Take 30 mg by mouth daily, Disp: , Rfl:     lisinopril-hydroCHLOROthiazide (PRINZIDE;ZESTORETIC) 20-12.5 MG per tablet, [The details of the medication are not available because there are pending changes by a home health clinician.], Disp: , Rfl:     metaxalone (SKELAXIN) 800 MG tablet, TAKE 1 TABLET BY MOUTH THREE TIMES DAILY AS NEEDED FOR MUSCLE SPASM, Disp: , Rfl:     montelukast (SINGULAIR) 10 MG tablet, TAKE 1 TABLET BY MOUTH EVERY DAY, Disp: , Rfl:     ondansetron (ZOFRAN) 4 MG tablet, Take 4 mg by mouth every 8 hours as needed, Disp: , Rfl:     warfarin (COUMADIN) 5 MG tablet, [The details of the medication are not available because there are pending changes by a home health clinician.], Disp: , Rfl:     Allergies   Allergen Reactions    Amoxicillin-Pot Clavulanate Itching    Chlorhexidine Itching     Other reaction(s): Unknown (comments)    Chlorhexidine Gluconate Itching    Lac Bovis Diarrhea    Nsaids Other (See Comments)     On blood thinner, contraindicated.      Simvastatin Other (See Comments)    Penicillins Itching and Rash    Ropinirole Nausea And Vomiting       Social History     Socioeconomic History    Marital status:      Spouse name: Not on file    Number of children: Not on file    Years of education: Not on file    Highest education level: Not on file   Occupational History    Not on file   Tobacco Use    Smoking status: Never    Smokeless tobacco: Never   Substance and Sexual Activity    Alcohol use: No    Drug use: Never    Sexual activity: Not on file   Other Topics Concern    Not on file   Social History Narrative    Not on file     Social Determinants of Health     Financial Resource Strain: Not on file   Food Insecurity: Not on file   Transportation Needs: Not on file   Physical Activity: Not on file   Stress: Not on file   Social Connections: Not on file   Intimate Partner Violence: Not on file   Housing Stability: Not on file       Past Surgical History:   Procedure Laterality Date    HEENT Right 09/11/2018    eye surgery, macular     LUMBAR LAMINECTOMY  2000    LUMBAR LAMINECTOMY  1992    ORTHOPEDIC SURGERY  06-25-12    Right foot with excision of bursa and adipose tissue from fifth metatarsal base by Dr. Maxine Murphy Left 03/09/2022    left knee revision    PROSTATECTOMY  11/2018    ROTATOR CUFF REPAIR Right 01/28/2019    by Dr. Armando Staton ARTHROSCOPY Left 12/2020    WORK RELATED INJURY    TOTAL KNEE ARTHROPLASTY Left 2018           Patient seen evaluated today for his left knee. He is status post revision left knee replacement. He been doing well. Recently he said he got out of the tub and felt a pull in the back of his knee. He had some increased discomfort since that time. He denies any start of pain. No fevers or chills. No night sweats. He denies any start of pain. He denies any radiating pain down the lower extremity however does have pain in the back of his calf as well as the back of his knee. He went for an ultrasound this morning of his lower extremity which was negative for DVT and did show popliteal cyst.    Patient denies recent fevers, chills, chest pain, SOB, or injuries. No recent systemic changes noted. A 12-point review of systems is performed today. Pertinent positives are noted. All other systems reviewed and otherwise are negative. Physical exam: General: Alert and oriented x3, nad.  well-developed, well nourished. normal affect, AF. NC/AT, EOMI, neck supple, trachea midline, no JVD present. Breathing is non-labored.   Examination of the lower extremities reveals pain-free range of motion of the hips. There is no pain to palpation the trochanter bursa. Negative straight leg raise. Negative calf tenderness. Negative Homans. No signs of DVT present. The left knee reveals skin intact. There is no erythema or ecchymosis noted. There are no signs for infection or cellulitis present. He has negative effusion. He does have some discomfort palpation of the popliteal space as well as still palpation to the gastrocnemius however no obvious defects are noted. He has some discomfort with dorsiflexion of the foot which pulls at the gastroc. Radiographs obtained in the office today 2/23/2023 of the left knee including AP, lateral, skyline    Assessment: #1 status post revision left knee replacement, #2 gastroc strain, #3 popliteal cyst    Plan: At this point, we discussed treatment options. Sounds like he mainly just strained his knee however we are going to move forward with obtaining some basic labs including CBC, ESR, CRP, IL-6, and uric acid. Expect these will come back as negative. We will see him back in the office afterwards to review. We will also get a PT/INR as he does take Coumadin. A prescription for Voltaren gel will be sent to his pharmacy. He is instructed on use as well as usual precautions.             JR Kayode TAMEZ PA-C, ATC

## 2023-02-24 LAB — IL6 SERPL-MCNC: 4.3 PG/ML (ref 0–13)

## 2023-02-27 ENCOUNTER — TELEPHONE (OUTPATIENT)
Age: 68
End: 2023-02-27

## 2023-02-27 NOTE — TELEPHONE ENCOUNTER
Patient wife called and would like to know is is ok if the patient take the medrol dose pack being that the patient just had an injection in his neck a few weeks ago       Please call and advise patient wife and patient at   [phone]

## 2023-03-03 ENCOUNTER — OFFICE VISIT (OUTPATIENT)
Age: 68
End: 2023-03-03
Payer: MEDICARE

## 2023-03-03 DIAGNOSIS — Z96.652 PRESENCE OF LEFT ARTIFICIAL KNEE JOINT: Primary | ICD-10-CM

## 2023-03-03 DIAGNOSIS — M25.562 LEFT KNEE PAIN, UNSPECIFIED CHRONICITY: ICD-10-CM

## 2023-03-03 PROCEDURE — G8417 CALC BMI ABV UP PARAM F/U: HCPCS | Performed by: PHYSICIAN ASSISTANT

## 2023-03-03 PROCEDURE — 99214 OFFICE O/P EST MOD 30 MIN: CPT | Performed by: PHYSICIAN ASSISTANT

## 2023-03-03 PROCEDURE — 3017F COLORECTAL CA SCREEN DOC REV: CPT | Performed by: PHYSICIAN ASSISTANT

## 2023-03-03 PROCEDURE — 1123F ACP DISCUSS/DSCN MKR DOCD: CPT | Performed by: PHYSICIAN ASSISTANT

## 2023-03-03 PROCEDURE — G8484 FLU IMMUNIZE NO ADMIN: HCPCS | Performed by: PHYSICIAN ASSISTANT

## 2023-03-03 PROCEDURE — 1036F TOBACCO NON-USER: CPT | Performed by: PHYSICIAN ASSISTANT

## 2023-03-03 PROCEDURE — G8428 CUR MEDS NOT DOCUMENT: HCPCS | Performed by: PHYSICIAN ASSISTANT

## 2023-03-03 NOTE — PROGRESS NOTES
Patient: Savannah Young                MRN: [id number]       SSN: [id number]  YOB: 1955        AGE: 69 y.o. SEX: male  There is no height or weight on file to calculate BMI. PCP: Ailin Martin MD  03/03/23      This office note has been dictated. REVIEW OF SYSTEMS:  Constitutional: Negative for fever, chills, weight loss and malaise/fatigue. HENT: Negative. Eyes: Negative. Respiratory: Negative. Cardiovascular: Negative. Gastrointestinal: No bowel incontinence or constipation. Genitourinary: No bladder incontinence or saddle anesthesia. Skin: Negative. Neurological: Negative. Endo/Heme/Allergies: Negative. Psychiatric/Behavioral: Negative. Musculoskeletal: As per HPI above. Past Medical History:   Diagnosis Date    Arthritis     Chronic pain     knee and shoulder    DVT (deep venous thrombosis) ([facility]) 2000    POST BACK SX. 2- LEFT LEG    DVT (deep venous thrombosis) ([facility] [address] 1992    post sx.   R LEG    GERD (gastroesophageal reflux disease)     Headache(784.0)     everyday    High cholesterol     Hypertension     Prostate cancer ([facility] [address] 2018    Pure hypercholesterolemia     Spinal stenosis     Thromboembolus ([facility])          Current Outpatient Medications:     diclofenac sodium (VOLTAREN) 1 % GEL, Apply 4 g topically 4 times daily, Disp: 100 g, Rfl: 2    acetaminophen (TYLENOL) 500 MG tablet, Take 1,000 mg by mouth every 6 hours as needed, Disp: , Rfl:     allopurinol (ZYLOPRIM) 100 MG tablet, Take 100 mg by mouth 2 times daily, Disp: , Rfl:     ALPRAZolam (XANAX) 0.5 MG tablet, [The details of the medication are not available because there are pending changes by a home health clinician.], Disp: , Rfl:     butalbital-APAP-caffeine -40 MG CAPS per capsule, [The details of the medication are not available because there are pending changes by a home health clinician.], Disp: , Rfl:     clotrimazole-betamethasone (LOTRISONE) 1-0.05 % lotion, Apply topically 2 times daily, Disp: , Rfl:     diclofenac sodium (VOLTAREN) 1 % GEL, Apply 4 g topically 4 times daily, Disp: , Rfl:     gabapentin (NEURONTIN) 100 MG capsule, Take 1 in the morning and 2 capsules at bedtime as directed  Indications: neuropathic pain, Disp: , Rfl:     labetalol (NORMODYNE) 100 MG tablet, [The details of the medication are not available because there are pending changes by a home health clinician.], Disp: , Rfl:     lansoprazole (PREVACID) 30 MG delayed release capsule, Take 30 mg by mouth daily, Disp: , Rfl:     lisinopril-hydroCHLOROthiazide (PRINZIDE;ZESTORETIC) 20-12.5 MG per tablet, [The details of the medication are not available because there are pending changes by a home health clinician.], Disp: , Rfl:     metaxalone (SKELAXIN) 800 MG tablet, TAKE 1 TABLET BY MOUTH THREE TIMES DAILY AS NEEDED FOR MUSCLE SPASM, Disp: , Rfl:     montelukast (SINGULAIR) 10 MG tablet, TAKE 1 TABLET BY MOUTH EVERY DAY, Disp: , Rfl:     ondansetron (ZOFRAN) 4 MG tablet, Take 4 mg by mouth every 8 hours as needed, Disp: , Rfl:     warfarin (COUMADIN) 5 MG tablet, [The details of the medication are not available because there are pending changes by a home health clinician.], Disp: , Rfl:     Allergies   Allergen Reactions    Amoxicillin-Pot Clavulanate Itching    Chlorhexidine Itching     Other reaction(s): Unknown (comments)    Chlorhexidine Gluconate Itching    Lac Bovis Diarrhea    Nsaids Other (See Comments)     On blood thinner, contraindicated. Simvastatin Other (See Comments)    Penicillins Itching and Rash    Ropinirole Nausea And Vomiting       Social History     Socioeconomic History    Marital status:      Spouse name: Not on file    Number of children: Not on file    Years of education: Not on file    Highest education level: Not on file   Occupational History    Not on file   Tobacco Use    Smoking status: Never    Smokeless tobacco: Never   Substance and Sexual Activity    Alcohol use:  No Drug use: Never    Sexual activity: Not on file   Other Topics Concern    Not on file   Social History Narrative    Not on file     Social Determinants of Health     Financial Resource Strain: Not on file   Food Insecurity: Not on file   Transportation Needs: Not on file   Physical Activity: Not on file   Stress: Not on file   Social Connections: Not on file   Intimate Partner Violence: Not on file   Housing Stability: Not on file       Past Surgical History:   Procedure Laterality Date    HEENT Right 09/11/2018    eye surgery, macular     LUMBAR LAMINECTOMY  2000    LUMBAR LAMINECTOMY  1992    ORTHOPEDIC SURGERY  06-25-12    Right foot with excision of bursa and adipose tissue from fifth metatarsal base by Dr. Falguni Arshad Left 03/09/2022    left knee revision    PROSTATECTOMY  11/2018    ROTATOR CUFF REPAIR Right 01/28/2019    by Dr. Antonio Dewey ARTHROSCOPY Left 12/2020    WORK RELATED INJURY    TOTAL KNEE ARTHROPLASTY Left 2018           Patient seen evaluated today for his left lower extremity. He is status post revision left knee replacement. Did well with it. Had a flareup of discomfort his left lower extremity in which we did some x-rays upon last visit which were normal.  We sent her for some infectious labs and presents today for reevaluation. He senses since that time he has had a flareup in his back with radiating pain down his left lower extremity which she thinks contributed to his pain. This is getting better. There have been no change in bowel bladder habits. He has no start of pain. No feelings of instability. Patient denies recent fevers, chills, chest pain, SOB, or injuries. No recent systemic changes noted. A 12-point review of systems is performed today. Pertinent positives are noted. All other systems reviewed and otherwise are negative. Physical exam: General: Alert and oriented x3, nad.  well-developed, well nourished. normal affect, AF.   NC/AT, EOMI, neck supple, trachea midline, no JVD present. Breathing is non-labored. Examination of lower extremities reveals pain-free range of motion of the hips. There is no pain to palpation the trochanter bursa. Negative straight leg raise. Negative calf tenderness. Negative Homans. No signs of DVT present. The left knee reveals skin intact. There is no erythema or ecchymosis noted. There are no signs for infection or cellulitis present. He does have full range of motion. Good stability. Patella tracks nicely without rubs or crepitus noted. Review of labs: WBC-6.9, INR-1.5, CRP-1.0, ESR-16, IL-6-4.3, uric acid-7.0    Review of Doppler ultrasound:    Chronic non-occlusive superficial vein thrombosis in the left small saphenous vein. (Known 6/19/2019)    No evidence of deep vein or superficial vein thrombosis in the left lower extremity. Vessels demonstrate normal compressibility, color filling, and phasic and spontaneous flow. For comparison purposes, the right common femoral vein was briefly interrogated. The vein demonstrates normal color filling and compressibility. Doppler flow was phasic and spontaneous. There is a fluid filled area seen in the left popliteal fossa that could be indicative of a Baker's cyst. Cyst dimensions are: 3.7 cm X 1.7 cm    Assessment: Status post revision left knee replacement, radiculopathy    Plan: At this point, the patient does continue on Coumadin. We discussed a referral over to the spine center. He has already been seen by Dr. Dede Elder in the past.  He is currently taking a Medrol Dosepak and is on his fourth day. It does seem to be helping. We will plan on seeing her back in the office about 3 months time for evaluation. He will call with any questions or concerns that shall arise. He will continue with a home exercise program to maintain his range of motion activities and mobility.             JR Kayode TAMEZ, PADARYN, ATC

## 2023-03-08 ENCOUNTER — TELEPHONE (OUTPATIENT)
Age: 68
End: 2023-03-08

## 2023-03-08 NOTE — TELEPHONE ENCOUNTER
Patient called in stating that he is in a lot of pain and requesting a sooner appointment. Patient is scheduled for 04/17 and can't wait that long.     Patient advise callback   [phone]

## 2023-03-09 ENCOUNTER — OFFICE VISIT (OUTPATIENT)
Age: 68
End: 2023-03-09

## 2023-03-09 VITALS
HEART RATE: 85 BPM | DIASTOLIC BLOOD PRESSURE: 72 MMHG | SYSTOLIC BLOOD PRESSURE: 128 MMHG | HEIGHT: 70 IN | WEIGHT: 235 LBS | TEMPERATURE: 98.1 F | OXYGEN SATURATION: 95 % | BODY MASS INDEX: 33.64 KG/M2

## 2023-03-09 DIAGNOSIS — M47.816 SPONDYLOSIS WITHOUT MYELOPATHY OR RADICULOPATHY, LUMBAR REGION: ICD-10-CM

## 2023-03-09 DIAGNOSIS — M48.061 SPINAL STENOSIS, LUMBAR REGION WITHOUT NEUROGENIC CLAUDICATION: ICD-10-CM

## 2023-03-09 DIAGNOSIS — G89.29 CHRONIC BILATERAL LOW BACK PAIN WITH LEFT-SIDED SCIATICA: Primary | ICD-10-CM

## 2023-03-09 DIAGNOSIS — M54.42 CHRONIC BILATERAL LOW BACK PAIN WITH LEFT-SIDED SCIATICA: Primary | ICD-10-CM

## 2023-03-09 RX ORDER — PREDNISONE 20 MG/1
TABLET ORAL
Qty: 20 TABLET | Refills: 0 | Status: SHIPPED | OUTPATIENT
Start: 2023-03-09

## 2023-03-09 RX ORDER — GABAPENTIN 300 MG/1
CAPSULE ORAL
COMMUNITY
Start: 2023-03-01

## 2023-03-09 RX ORDER — TRIAMCINOLONE ACETONIDE 1 MG/G
CREAM TOPICAL
COMMUNITY
Start: 2023-02-27

## 2023-03-09 ASSESSMENT — PATIENT HEALTH QUESTIONNAIRE - PHQ9
SUM OF ALL RESPONSES TO PHQ QUESTIONS 1-9: 0
2. FEELING DOWN, DEPRESSED OR HOPELESS: 0
SUM OF ALL RESPONSES TO PHQ9 QUESTIONS 1 & 2: 0
SUM OF ALL RESPONSES TO PHQ QUESTIONS 1-9: 0
SUM OF ALL RESPONSES TO PHQ QUESTIONS 1-9: 0
1. LITTLE INTEREST OR PLEASURE IN DOING THINGS: 0
SUM OF ALL RESPONSES TO PHQ QUESTIONS 1-9: 0

## 2023-03-09 NOTE — PROGRESS NOTES
Chief complaint   Chief Complaint   Patient presents with    Back Pain       History of Present Illness:  Yanira Peoples is a  69 y.o.  male who comes in today with a flare of pain. He states for about 3 weeks he has had terrible left sciatica. He thought at first it was coming from his left knee and he saw Eugene Navarro who ruled out a DVT or any type of infection. He states he had a Medrol Dosepak at home that Dr. Lynda Martinez will let him keep on hand and he finished that last Sunday. It did help some while he was on it but then by Tuesday his pain came back. He is also on gabapentin 100 mg 2 capsules at night and Skelaxin 800 mg 2 capsules at night. He is having to use a cane more due to the pain. He states sometimes he does use a cane because of knee problems but since his back and leg is flared up he is having to use it more consistently. He has had a L4-5 laminectomy in 2000 by Dr. Avel Amaya and prior to that he had a bilateral L5 laminectomy by Dr. Palmira Oswald. He is on Coumadin so he cannot take any NSAIDs. He denies fever bowel bladder dysfunction. Physical Exam: The patient is a 57-year-old male well-developed well-nourished who is alert and oriented with a normal mood and affect. He has a full weightbearing slow and slightly antalgic gait utilizing a single-point cane. He has 4 out of 5 strength bilateral lower extremities. Negative straight leg raise on the right and positive on the left. He did not have any pain with hyperextension lumbar spine. Assessment and Plan: This is a patient who has had a work-related injury that gives him back pain and leg pain. He has degenerative disc disease and spinal stenosis along with facet arthropathy. We did a lumbar x-ray today. It does show some severe spondylosis and some disc space narrowing. I will give him a stronger prednisone taper to see if we can calm this down.   If he does not improve he will let us know otherwise we will see him back at his scheduled appointment with Dr. Pena in April.    XRAY: 03/09/23   body part: Lumbar  side (rt/lt): bilateral  number of views taken:2  Findings: Spondylosis and degenerative disc disease    The x-ray will be officially read by Dr. Cheng and scanned into the chart.       Kalen was seen today for back pain.    Diagnoses and all orders for this visit:    Chronic bilateral low back pain with left-sided sciatica  -     AMB POC XRAY, SPINE, LUMBOSACRAL; 2 O  -     predniSONE (DELTASONE) 20 MG tablet; Take 3 tabs po qd x 3 days then 2 tabs po x 3 days then 1 tab po x 3 days then 1/2 tab po x 4 days with food.    Spinal stenosis, lumbar region without neurogenic claudication  -     predniSONE (DELTASONE) 20 MG tablet; Take 3 tabs po qd x 3 days then 2 tabs po x 3 days then 1 tab po x 3 days then 1/2 tab po x 4 days with food.    Spondylosis without myelopathy or radiculopathy, lumbar region  -     predniSONE (DELTASONE) 20 MG tablet; Take 3 tabs po qd x 3 days then 2 tabs po x 3 days then 1 tab po x 3 days then 1/2 tab po x 4 days with food.      No follow-ups on file.      has been .reviewed and is appropriate    Medications:  Current Outpatient Medications   Medication Sig Dispense Refill    gabapentin (NEURONTIN) 300 MG capsule TAKE 1 CAPSULE BY MOUTH TWICE DAILY      triamcinolone (KENALOG) 0.1 % cream APPLY TOPICALLY TO THE AFFECTED AREA TWICE DAILY AS NEEDED FOR ECZEMA      predniSONE (DELTASONE) 20 MG tablet Take 3 tabs po qd x 3 days then 2 tabs po x 3 days then 1 tab po x 3 days then 1/2 tab po x 4 days with food. 20 tablet 0    diclofenac sodium (VOLTAREN) 1 % GEL Apply 4 g topically 4 times daily 100 g 2    acetaminophen (TYLENOL) 500 MG tablet Take 1,000 mg by mouth every 6 hours as needed      ALPRAZolam (XANAX) 0.5 MG tablet Take 0.5 mg by mouth nightly as needed.      butalbital-APAP-caffeine -40 MG CAPS per capsule Take 1 capsule by mouth every 6 hours as needed      clotrimazole-betamethasone  (LOTRISONE) 1-0.05 % lotion Apply topically 2 times daily      gabapentin (NEURONTIN) 100 MG capsule Take 1 in the morning and 2 capsules at bedtime as directed  Indications: neuropathic pain      labetalol (NORMODYNE) 100 MG tablet Take 100 mg by mouth 2 times daily      lansoprazole (PREVACID) 30 MG delayed release capsule Take 30 mg by mouth daily      lisinopril-hydroCHLOROthiazide (PRINZIDE;ZESTORETIC) 20-12.5 MG per tablet Take 1 tablet by mouth daily      metaxalone (SKELAXIN) 800 MG tablet TAKE 1 TABLET BY MOUTH THREE TIMES DAILY AS NEEDED FOR MUSCLE SPASM      montelukast (SINGULAIR) 10 MG tablet TAKE 1 TABLET BY MOUTH EVERY DAY      ondansetron (ZOFRAN) 4 MG tablet Take 4 mg by mouth every 8 hours as needed      warfarin (COUMADIN) 5 MG tablet Take 5 mg by mouth in the morning and at bedtime      allopurinol (ZYLOPRIM) 100 MG tablet Take 100 mg by mouth 2 times daily      diclofenac sodium (VOLTAREN) 1 % GEL Apply 4 g topically 4 times daily       No current facility-administered medications for this visit. Review of systems:    Past Medical History:   Diagnosis Date    Arthritis     Chronic pain     knee and shoulder    DVT (deep venous thrombosis) ([facility]) 2000    POST BACK SX. 2- LEFT LEG    DVT (deep venous thrombosis) ([facility] [address] 1992    post sx.   R LEG    GERD (gastroesophageal reflux disease)     Headache(784.0)     everyday    High cholesterol     Hypertension     Prostate cancer ([facility] [address] 2018    Pure hypercholesterolemia     Spinal stenosis     Thromboembolus [facility])      Past Surgical History:   Procedure Laterality Date    HEENT Right 09/11/2018    eye surgery, macular     LUMBAR LAMINECTOMY  2000    LUMBAR LAMINECTOMY  1992    ORTHOPEDIC SURGERY  06-25-12    Right foot with excision of bursa and adipose tissue from fifth metatarsal base by Dr. Orene Lundborg Left 03/09/2022    left knee revision    PROSTATECTOMY  11/2018    ROTATOR CUFF REPAIR Right 01/28/2019    by Dr. Jeannette España SHOULDER ARTHROSCOPY Left 12/2020    WORK RELATED INJURY    TOTAL KNEE ARTHROPLASTY Left 2018     Social History     Socioeconomic History    Marital status:      Spouse name: Not on file    Number of children: Not on file    Years of education: Not on file    Highest education level: Not on file   Occupational History    Not on file   Tobacco Use    Smoking status: Never    Smokeless tobacco: Never   Substance and Sexual Activity    Alcohol use: No    Drug use: Never    Sexual activity: Not on file   Other Topics Concern    Not on file   Social History Narrative    Not on file     Social Determinants of Health     Financial Resource Strain: Not on file   Food Insecurity: Not on file   Transportation Needs: Not on file   Physical Activity: Not on file   Stress: Not on file   Social Connections: Not on file   Intimate Partner Violence: Not on file   Housing Stability: Not on file     Family History   Problem Relation Age of Onset    Rheum Arthritis Mother     Dementia Mother     Heart Disease Father     Cancer Father     Hypertension Other     Cancer Brother        Physical Exam:  /72 (Site: Left Upper Arm, Position: Sitting, Cuff Size: Large Adult)   Pulse 85   Temp 98.1 °F (36.7 °C) (Oral)   Ht 5' 10\" (1.778 m)   Wt 235 lb (106.6 kg)   SpO2 95% Comment: RA  BMI 33.72 kg/m²   Pain Scale: 5/10      We have informed rEi Trotterion to notify us for immediate appointment if he has any worsening neurogical symptoms or if an emergency situation presents, then call 911    Please note that this dictation was completed with [employer], the computer voice recognition software. Quite often unanticipated grammatical, syntax, homophones, and other interpretive errors are inadvertently transcribed by the computer software. Please disregard these errors. Please excuse any errors that have escaped final proofreading.      Dereje Connolly, APRN - NP

## 2023-04-14 ENCOUNTER — HOSPITAL ENCOUNTER (OUTPATIENT)
Facility: HOSPITAL | Age: 68
Discharge: HOME | End: 2023-04-17
Payer: MEDICARE

## 2023-04-14 DIAGNOSIS — R10.9 STOMACH ACHE: ICD-10-CM

## 2023-04-14 PROCEDURE — 76705 ECHO EXAM OF ABDOMEN: CPT

## 2023-04-26 ENCOUNTER — HOSPITAL ENCOUNTER (OUTPATIENT)
Facility: HOSPITAL | Age: 68
Discharge: HOME | End: 2023-04-29
Payer: MEDICARE

## 2023-04-26 DIAGNOSIS — R10.10 UPPER ABDOMINAL PAIN, UNSPECIFIED: ICD-10-CM

## 2023-04-26 LAB — CREAT UR-MCNC: 1 MG/DL (ref 0.6–1.3)

## 2023-04-26 PROCEDURE — 74160 CT ABDOMEN W/CONTRAST: CPT

## 2023-04-26 PROCEDURE — 6360000004 HC RX CONTRAST MEDICATION

## 2023-04-26 PROCEDURE — 82565 ASSAY OF CREATININE: CPT

## 2023-04-26 RX ADMIN — IOPAMIDOL 100 ML: 612 INJECTION, SOLUTION INTRAVENOUS at 15:02

## 2023-05-23 ENCOUNTER — APPOINTMENT (OUTPATIENT)
Facility: HOSPITAL | Age: 68
DRG: 603 | End: 2023-05-23
Payer: MEDICARE

## 2023-05-23 ENCOUNTER — HOSPITAL ENCOUNTER (EMERGENCY)
Facility: HOSPITAL | Age: 68
Discharge: HOME | DRG: 603 | End: 2023-05-23
Attending: STUDENT IN AN ORGANIZED HEALTH CARE EDUCATION/TRAINING PROGRAM
Payer: MEDICARE

## 2023-05-23 VITALS
HEIGHT: 70 IN | OXYGEN SATURATION: 95 % | HEART RATE: 98 BPM | RESPIRATION RATE: 18 BRPM | TEMPERATURE: 100 F | DIASTOLIC BLOOD PRESSURE: 60 MMHG | BODY MASS INDEX: 33.21 KG/M2 | WEIGHT: 232 LBS | SYSTOLIC BLOOD PRESSURE: 121 MMHG

## 2023-05-23 DIAGNOSIS — L03.116 CELLULITIS OF LEFT LOWER EXTREMITY: ICD-10-CM

## 2023-05-23 DIAGNOSIS — R10.9 FLANK PAIN: Primary | ICD-10-CM

## 2023-05-23 DIAGNOSIS — A41.9 SEPTICEMIA (HCC): ICD-10-CM

## 2023-05-23 LAB
ALBUMIN SERPL-MCNC: 3.7 G/DL (ref 3.4–5)
ALBUMIN/GLOB SERPL: 1.4 (ref 0.8–1.7)
ALP SERPL-CCNC: 71 U/L (ref 45–117)
ALT SERPL-CCNC: 25 U/L (ref 16–61)
ANION GAP SERPL CALC-SCNC: 5 MMOL/L (ref 3–18)
APPEARANCE UR: CLEAR
AST SERPL-CCNC: 21 U/L (ref 10–38)
B PERT DNA SPEC QL NAA+PROBE: NOT DETECTED
BASOPHILS # BLD: 0.1 K/UL (ref 0–0.1)
BASOPHILS NFR BLD: 1 % (ref 0–2)
BILIRUB SERPL-MCNC: 0.4 MG/DL (ref 0.2–1)
BILIRUB UR QL: NEGATIVE
BORDETELLA PARAPERTUSSIS BY PCR: NOT DETECTED
BUN SERPL-MCNC: 16 MG/DL (ref 7–18)
BUN/CREAT SERPL: 15 (ref 12–20)
C PNEUM DNA SPEC QL NAA+PROBE: NOT DETECTED
CALCIUM SERPL-MCNC: 9 MG/DL (ref 8.5–10.1)
CHLORIDE SERPL-SCNC: 106 MMOL/L (ref 100–111)
CO2 SERPL-SCNC: 29 MMOL/L (ref 21–32)
COLOR UR: YELLOW
CREAT SERPL-MCNC: 1.06 MG/DL (ref 0.6–1.3)
DIFFERENTIAL METHOD BLD: ABNORMAL
EKG ATRIAL RATE: 108 BPM
EKG DIAGNOSIS: NORMAL
EKG P AXIS: 56 DEGREES
EKG P-R INTERVAL: 190 MS
EKG Q-T INTERVAL: 314 MS
EKG QRS DURATION: 92 MS
EKG QTC CALCULATION (BAZETT): 420 MS
EKG R AXIS: -6 DEGREES
EKG T AXIS: 57 DEGREES
EKG VENTRICULAR RATE: 108 BPM
EOSINOPHIL # BLD: 0.1 K/UL (ref 0–0.4)
EOSINOPHIL NFR BLD: 1 % (ref 0–5)
ERYTHROCYTE [DISTWIDTH] IN BLOOD BY AUTOMATED COUNT: 12.7 % (ref 11.6–14.5)
ETHANOL SERPL-MCNC: <3 MG/DL (ref 0–3)
FLUAV SUBTYP SPEC NAA+PROBE: NOT DETECTED
FLUBV RNA SPEC QL NAA+PROBE: NOT DETECTED
GLOBULIN SER CALC-MCNC: 2.7 G/DL (ref 2–4)
GLUCOSE SERPL-MCNC: 131 MG/DL (ref 74–99)
GLUCOSE UR STRIP.AUTO-MCNC: NEGATIVE MG/DL
HADV DNA SPEC QL NAA+PROBE: NOT DETECTED
HCOV 229E RNA SPEC QL NAA+PROBE: NOT DETECTED
HCOV HKU1 RNA SPEC QL NAA+PROBE: NOT DETECTED
HCOV NL63 RNA SPEC QL NAA+PROBE: NOT DETECTED
HCOV OC43 RNA SPEC QL NAA+PROBE: NOT DETECTED
HCT VFR BLD AUTO: 38.7 % (ref 36–48)
HGB BLD-MCNC: 13.2 G/DL (ref 13–16)
HGB UR QL STRIP: NEGATIVE
HMPV RNA SPEC QL NAA+PROBE: NOT DETECTED
HPIV1 RNA SPEC QL NAA+PROBE: NOT DETECTED
HPIV2 RNA SPEC QL NAA+PROBE: NOT DETECTED
HPIV3 RNA SPEC QL NAA+PROBE: NOT DETECTED
HPIV4 RNA SPEC QL NAA+PROBE: NOT DETECTED
IMM GRANULOCYTES # BLD AUTO: 0.1 K/UL (ref 0–0.04)
IMM GRANULOCYTES NFR BLD AUTO: 1 % (ref 0–0.5)
INR PPP: 2.1 (ref 0.8–1.2)
KETONES UR QL STRIP.AUTO: NEGATIVE MG/DL
LACTATE SERPL-SCNC: 1.6 MMOL/L (ref 0.4–2)
LEUKOCYTE ESTERASE UR QL STRIP.AUTO: NEGATIVE
LIPASE SERPL-CCNC: 159 U/L (ref 73–393)
LYMPHOCYTES # BLD: 0.6 K/UL (ref 0.9–3.6)
LYMPHOCYTES NFR BLD: 6 % (ref 21–52)
M PNEUMO DNA SPEC QL NAA+PROBE: NOT DETECTED
MAGNESIUM SERPL-MCNC: 2 MG/DL (ref 1.6–2.6)
MCH RBC QN AUTO: 32.8 PG (ref 24–34)
MCHC RBC AUTO-ENTMCNC: 34.1 G/DL (ref 31–37)
MCV RBC AUTO: 96 FL (ref 78–100)
MONOCYTES # BLD: 0.7 K/UL (ref 0.05–1.2)
MONOCYTES NFR BLD: 7 % (ref 3–10)
NEUTS SEG # BLD: 8.9 K/UL (ref 1.8–8)
NEUTS SEG NFR BLD: 86 % (ref 40–73)
NITRITE UR QL STRIP.AUTO: NEGATIVE
NRBC # BLD: 0 K/UL (ref 0–0.01)
NRBC BLD-RTO: 0 PER 100 WBC
NT PRO BNP: 89 PG/ML (ref 0–900)
PH UR STRIP: 8 (ref 5–8)
PLATELET # BLD AUTO: 143 K/UL (ref 135–420)
PMV BLD AUTO: 10.7 FL (ref 9.2–11.8)
POTASSIUM SERPL-SCNC: 3.8 MMOL/L (ref 3.5–5.5)
PROCALCITONIN SERPL-MCNC: 0.08 NG/ML
PROT SERPL-MCNC: 6.4 G/DL (ref 6.4–8.2)
PROT UR STRIP-MCNC: NEGATIVE MG/DL
PROTHROMBIN TIME: 23.6 SEC (ref 11.5–15.2)
RBC # BLD AUTO: 4.03 M/UL (ref 4.35–5.65)
RSV RNA SPEC QL NAA+PROBE: NOT DETECTED
RV+EV RNA SPEC QL NAA+PROBE: NOT DETECTED
SARS-COV-2 RNA RESP QL NAA+PROBE: NOT DETECTED
SODIUM SERPL-SCNC: 140 MMOL/L (ref 136–145)
SP GR UR REFRACTOMETRY: 1.02 (ref 1–1.03)
TROPONIN I SERPL HS-MCNC: 10 NG/L (ref 0–78)
TSH SERPL DL<=0.05 MIU/L-ACNC: 0.59 UIU/ML (ref 0.36–3.74)
UROBILINOGEN UR QL STRIP.AUTO: 0.2 EU/DL (ref 0.2–1)
WBC # BLD AUTO: 10.4 K/UL (ref 4.6–13.2)

## 2023-05-23 PROCEDURE — 74177 CT ABD & PELVIS W/CONTRAST: CPT

## 2023-05-23 PROCEDURE — 85025 COMPLETE CBC W/AUTO DIFF WBC: CPT

## 2023-05-23 PROCEDURE — 83735 ASSAY OF MAGNESIUM: CPT

## 2023-05-23 PROCEDURE — 87186 SC STD MICRODIL/AGAR DIL: CPT

## 2023-05-23 PROCEDURE — 93010 ELECTROCARDIOGRAM REPORT: CPT | Performed by: INTERNAL MEDICINE

## 2023-05-23 PROCEDURE — 6360000004 HC RX CONTRAST MEDICATION: Performed by: STUDENT IN AN ORGANIZED HEALTH CARE EDUCATION/TRAINING PROGRAM

## 2023-05-23 PROCEDURE — 81003 URINALYSIS AUTO W/O SCOPE: CPT

## 2023-05-23 PROCEDURE — 80053 COMPREHEN METABOLIC PANEL: CPT

## 2023-05-23 PROCEDURE — 87040 BLOOD CULTURE FOR BACTERIA: CPT

## 2023-05-23 PROCEDURE — 83690 ASSAY OF LIPASE: CPT

## 2023-05-23 PROCEDURE — 84145 PROCALCITONIN (PCT): CPT

## 2023-05-23 PROCEDURE — 93005 ELECTROCARDIOGRAM TRACING: CPT | Performed by: STUDENT IN AN ORGANIZED HEALTH CARE EDUCATION/TRAINING PROGRAM

## 2023-05-23 PROCEDURE — 6370000000 HC RX 637 (ALT 250 FOR IP): Performed by: STUDENT IN AN ORGANIZED HEALTH CARE EDUCATION/TRAINING PROGRAM

## 2023-05-23 PROCEDURE — 2580000003 HC RX 258: Performed by: STUDENT IN AN ORGANIZED HEALTH CARE EDUCATION/TRAINING PROGRAM

## 2023-05-23 PROCEDURE — 84484 ASSAY OF TROPONIN QUANT: CPT

## 2023-05-23 PROCEDURE — 0202U NFCT DS 22 TRGT SARS-COV-2: CPT

## 2023-05-23 PROCEDURE — 6370000000 HC RX 637 (ALT 250 FOR IP): Performed by: EMERGENCY MEDICINE

## 2023-05-23 PROCEDURE — 85610 PROTHROMBIN TIME: CPT

## 2023-05-23 PROCEDURE — 87077 CULTURE AEROBIC IDENTIFY: CPT

## 2023-05-23 PROCEDURE — 6360000002 HC RX W HCPCS: Performed by: STUDENT IN AN ORGANIZED HEALTH CARE EDUCATION/TRAINING PROGRAM

## 2023-05-23 PROCEDURE — 87147 CULTURE TYPE IMMUNOLOGIC: CPT

## 2023-05-23 PROCEDURE — 82077 ASSAY SPEC XCP UR&BREATH IA: CPT

## 2023-05-23 PROCEDURE — 71045 X-RAY EXAM CHEST 1 VIEW: CPT

## 2023-05-23 PROCEDURE — 83880 ASSAY OF NATRIURETIC PEPTIDE: CPT

## 2023-05-23 PROCEDURE — 84443 ASSAY THYROID STIM HORMONE: CPT

## 2023-05-23 PROCEDURE — 83605 ASSAY OF LACTIC ACID: CPT

## 2023-05-23 PROCEDURE — 87150 DNA/RNA AMPLIFIED PROBE: CPT

## 2023-05-23 RX ORDER — IBUPROFEN 600 MG/1
600 TABLET ORAL
Status: COMPLETED | OUTPATIENT
Start: 2023-05-23 | End: 2023-05-23

## 2023-05-23 RX ORDER — ACETAMINOPHEN 500 MG
1000 TABLET ORAL
Status: COMPLETED | OUTPATIENT
Start: 2023-05-23 | End: 2023-05-23

## 2023-05-23 RX ORDER — DOXYCYCLINE HYCLATE 100 MG
100 TABLET ORAL 2 TIMES DAILY
Qty: 14 TABLET | Refills: 0 | Status: ON HOLD | OUTPATIENT
Start: 2023-05-23 | End: 2023-05-26 | Stop reason: SDUPTHER

## 2023-05-23 RX ORDER — 0.9 % SODIUM CHLORIDE 0.9 %
1000 INTRAVENOUS SOLUTION INTRAVENOUS ONCE
Status: COMPLETED | OUTPATIENT
Start: 2023-05-23 | End: 2023-05-23

## 2023-05-23 RX ADMIN — SODIUM CHLORIDE 1000 ML: 9 INJECTION, SOLUTION INTRAVENOUS at 05:35

## 2023-05-23 RX ADMIN — VANCOMYCIN HYDROCHLORIDE 2000 MG: 10 INJECTION, POWDER, LYOPHILIZED, FOR SOLUTION INTRAVENOUS at 05:48

## 2023-05-23 RX ADMIN — IBUPROFEN 600 MG: 600 TABLET, FILM COATED ORAL at 05:53

## 2023-05-23 RX ADMIN — ACETAMINOPHEN 1000 MG: 500 TABLET ORAL at 08:05

## 2023-05-23 RX ADMIN — CEFEPIME 2000 MG: 2 INJECTION, POWDER, FOR SOLUTION INTRAVENOUS at 05:43

## 2023-05-23 RX ADMIN — IOPAMIDOL 100 ML: 612 INJECTION, SOLUTION INTRAVENOUS at 06:56

## 2023-05-23 ASSESSMENT — PAIN DESCRIPTION - LOCATION: LOCATION: FLANK

## 2023-05-23 ASSESSMENT — PAIN DESCRIPTION - DESCRIPTORS: DESCRIPTORS: SPASM

## 2023-05-23 ASSESSMENT — PAIN DESCRIPTION - ORIENTATION: ORIENTATION: RIGHT

## 2023-05-23 ASSESSMENT — PAIN SCALES - GENERAL: PAINLEVEL_OUTOF10: 7

## 2023-05-23 NOTE — PROGRESS NOTES
[facility] Renal Dosing Services    Pharmacist Renal Dosing Note for Cefepime     Physician/Prescriber:  Chandni Valencia MD    Patient clinical status and labs ordered/reviewed. Pt Weight Weight - Scale: 232 lb (105.2 kg)   Serum Creatinine No results found for: DONI, CREA, CREAPOC    Creatinine Clearance Estimated Creatinine Clearance: 84 mL/min (based on SCr of 1 mg/dL). BUN Lab Results   Component Value Date/Time    BUN 11 02/28/2022 10:35 AM       WBC Lab Results   Component Value Date/Time    WBC 6.9 02/23/2023 03:51 PM      Temperature Temp: 100.1 °F (37.8 °C)   HR Pulse: (!) 111     BP BP: (!) 164/72       Drug type: Antibiotic indicated for Sepsis of Unknown Etiology     Cefepime 2 g IV q8h was adjusted to: 2 g IV over 5 min x1 dose, then 2 g IV q8h infused over 4 hrs (for eCrCl = 81 mL/min) - per Extended Infusion B-Lactam Antibiotics Protocol. Continue to monitor.     Signed KIP Friedman [facility]  Date 5/23/2023  Time 5:23 AM

## 2023-05-23 NOTE — ED PROVIDER NOTES
[facility] EMERGENCY DEPT  EMERGENCY DEPARTMENT ENCOUNTER      Pt Name: Yovani Blunt  MRN: [id number]  Mariettatrongfizzy 1955  Date of evaluation: 5/23/2023  Provider: Marta Cagle MD    CHIEF COMPLAINT       Chief Complaint   Patient presents with    Chills    Flank Pain         HISTORY OF PRESENT ILLNESS   (Location/Symptom, Timing/Onset, Context/Setting, Quality, Duration, Modifying Factors, Severity)  Note limiting factors. Yovani Blunt is a 77 y.o. male who presents to the emergency department for chills and not feeling well. He states that this started when he woke up at around 230 this morning. He states that yesterday he did go to sleep not feeling well. He has been having pain in his right flank for many months but does feel like its been getting worse. Is significantly worse since waking up with a fever. Denies any dysuria, hematuria increased urinary frequency. Denies any runny nose, sore throat or cough. Denies any chest pain or difficulty breathing. Denies abdominal pain. Feels little nauseous but no vomiting. Denies any recent sick contacts or COVID exposure. Did take some Tylenol prior to arrival.  Does not think it helps. Does have a history of blood clots but states has been compliant with his warfarin and his INR levels have been normal.  Denies any pain with deep inspiration. Nursing Notes were reviewed. REVIEW OF SYSTEMS    (2-9 systems for level 4, 10 or more for level 5)     Constitutional: Positive for fever  HENT: [no ear pain]  Eyes: [no change in vision]  Respiratory: [no SOB]  Cardio: [no chest pain]  GI: [no blood in stool]  : [no hematuria]  MSK: Positive for back pain  Skin: [no rashes]  Neuro: [no headache]    Except as noted above the remainder of the review of systems was reviewed and negative.        PAST MEDICAL HISTORY     Past Medical History:   Diagnosis Date    Arthritis     Chronic pain     knee and shoulder    DVT (deep venous thrombosis) ([facility] [address] 2000    POST

## 2023-05-23 NOTE — ED NOTES
New 20G PIV placed to pt's R AC by Frandy Rider RN   1st set of Blood Cultures obtained with [place]-including Lactic. COVID/Flu swab obtained. Portable CXR at bedside. EKG completed by this RN.    Provided to MD. Saba Willingham RN  05/23/23 3528

## 2023-05-23 NOTE — PROGRESS NOTES
Pharmacy Pharmacokinetic Monitoring Service - Vancomycin     Remy Purdy is a 77 y.o. male starting on vancomycin therapy for Sepsis of Unknown Etiology (x7 days). Pharmacy consulted by Provider: Benedict Steve MD for monitoring and adjustment. Target Concentration: Goal AUC/GIRISH 400-600 mg*hr/L    Additional Antimicrobials: Cefepime    Pertinent Laboratory Values:   Temp: 100.1 °F (37.8 °C), Weight - Scale: 232 lb (105.2 kg)  Recent Labs     05/23/23  0510   BUN 16   WBC 10.4     No results for input(s): VANRA in the last 72 hours. Invalid input(s): VANCP, VANCT, VANCR  Estimated Creatinine Clearance: 79 mL/min (based on SCr of 1.06 mg/dL). Pertinent Cultures:  Culture Date Source Results   5/23 Blood pending   MRSA Nasal Swab: N/A.  Non-respiratory infection    Plan:  Dosing recommendations based on Bayesian software  Start vancomycin 2000 mg IV x1, then 1000 mg IV q12h  Anticipated AUC of 516 and trough concentration of 17.4 at steady state  Renal labs as indicated   Vancomycin concentration to be ordered as indicated  Pharmacy will continue to monitor patient and adjust therapy as indicated    Thank you for the consult,    KIP Santos [facility] [place]  5/23/2023

## 2023-05-23 NOTE — ED TRIAGE NOTES
Pt arrives via [facility] with complaints of full body chills with R flank pain and pain with urination when waking up around 0230. States he felt hot. Wife administered tylenol around 0230 and again around 0400. EMS     Hx of cyst on kidney    Pt arrived tachycardic and tachypnic.  and RR 27.     Pt placed on full cardiac monitoring. Code Sepsis Alerted. Past Medical History:   Diagnosis Date    Arthritis     Chronic pain     knee and shoulder    DVT (deep venous thrombosis) ([facility]) 2000    POST BACK SX. 2- LEFT LEG    DVT (deep venous thrombosis) ([address] 1992    post sx.   R LEG    GERD (gastroesophageal reflux disease)     Headache(784.0)     everyday    High cholesterol     Hypertension     Prostate cancer ([address] 2018    Pure hypercholesterolemia     Spinal stenosis     Thromboembolus ([address]

## 2023-05-23 NOTE — ED NOTES
2nd set of Blood cultures obtained, labeled, and walked to lab. Allergies verified. IV abx initiated x2. IV fluid boluses initiated x2. PO ibuprofen administered d/t fever.    See [facility]     Jenna Brannon RN  05/23/23 5181

## 2023-05-23 NOTE — ED NOTES
PT assisted to standing position to void. Pt voided 300 mL clear yellow urine in urinal.  Clean catch urine specimen walked to lab by this RN.          Gilbert Israel RN  05/23/23 0775

## 2023-05-24 ENCOUNTER — HOSPITAL ENCOUNTER (INPATIENT)
Facility: HOSPITAL | Age: 68
LOS: 2 days | Discharge: HOME | DRG: 603 | End: 2023-05-26
Attending: EMERGENCY MEDICINE | Admitting: HOSPITALIST
Payer: MEDICARE

## 2023-05-24 ENCOUNTER — APPOINTMENT (OUTPATIENT)
Facility: HOSPITAL | Age: 68
DRG: 603 | End: 2023-05-24
Payer: MEDICARE

## 2023-05-24 DIAGNOSIS — R78.81 BACTEREMIA DUE TO GROUP B STREPTOCOCCUS: ICD-10-CM

## 2023-05-24 DIAGNOSIS — B95.1 BACTEREMIA DUE TO GROUP B STREPTOCOCCUS: ICD-10-CM

## 2023-05-24 DIAGNOSIS — L03.116 CELLULITIS OF LEFT LOWER EXTREMITY: ICD-10-CM

## 2023-05-24 DIAGNOSIS — R78.81 BACTEREMIA: Primary | ICD-10-CM

## 2023-05-24 LAB
ACCESSION NUMBER, LLC1M: ABNORMAL
ACINETOBACTER CALCOAC BAUMANNII COMPLEX BY PCR: NOT DETECTED
ALBUMIN SERPL-MCNC: 3.2 G/DL (ref 3.4–5)
ALBUMIN/GLOB SERPL: 1.2 (ref 0.8–1.7)
ALP SERPL-CCNC: 56 U/L (ref 45–117)
ALT SERPL-CCNC: 25 U/L (ref 16–61)
ANION GAP SERPL CALC-SCNC: 6 MMOL/L (ref 3–18)
AST SERPL-CCNC: 18 U/L (ref 10–38)
B FRAGILIS DNA BLD POS QL NAA+NON-PROBE: NOT DETECTED
BASOPHILS # BLD: 0 K/UL (ref 0–0.1)
BASOPHILS NFR BLD: 0 % (ref 0–2)
BILIRUB SERPL-MCNC: 0.4 MG/DL (ref 0.2–1)
BIOFIRE TEST COMMENT: ABNORMAL
BUN SERPL-MCNC: 15 MG/DL (ref 7–18)
BUN/CREAT SERPL: 15 (ref 12–20)
C ALBICANS DNA BLD POS QL NAA+NON-PROBE: NOT DETECTED
C AURIS DNA BLD POS QL NAA+NON-PROBE: NOT DETECTED
C GATTII+NEOFOR DNA BLD POS QL NAA+N-PRB: NOT DETECTED
C GLABRATA DNA BLD POS QL NAA+NON-PROBE: NOT DETECTED
C KRUSEI DNA BLD POS QL NAA+NON-PROBE: NOT DETECTED
C PARAP DNA BLD POS QL NAA+NON-PROBE: NOT DETECTED
C TROPICLS DNA BLD POS QL NAA+NON-PROBE: NOT DETECTED
CALCIUM SERPL-MCNC: 8.7 MG/DL (ref 8.5–10.1)
CHLORIDE SERPL-SCNC: 106 MMOL/L (ref 100–111)
CO2 SERPL-SCNC: 26 MMOL/L (ref 21–32)
CREAT SERPL-MCNC: 1.03 MG/DL (ref 0.6–1.3)
DIFFERENTIAL METHOD BLD: ABNORMAL
E CLOAC COMP DNA BLD POS NAA+NON-PROBE: NOT DETECTED
E COLI DNA BLD POS QL NAA+NON-PROBE: NOT DETECTED
E FAECALIS DNA BLD POS QL NAA+NON-PROBE: NOT DETECTED
E FAECIUM DNA BLD POS QL NAA+NON-PROBE: NOT DETECTED
EKG ATRIAL RATE: 69 BPM
EKG DIAGNOSIS: NORMAL
EKG P AXIS: 42 DEGREES
EKG P-R INTERVAL: 200 MS
EKG Q-T INTERVAL: 386 MS
EKG QRS DURATION: 90 MS
EKG QTC CALCULATION (BAZETT): 413 MS
EKG R AXIS: -9 DEGREES
EKG T AXIS: 33 DEGREES
EKG VENTRICULAR RATE: 69 BPM
ENTEROBACTERALES DNA BLD POS NAA+N-PRB: NOT DETECTED
EOSINOPHIL # BLD: 0 K/UL (ref 0–0.4)
EOSINOPHIL NFR BLD: 0 % (ref 0–5)
ERYTHROCYTE [DISTWIDTH] IN BLOOD BY AUTOMATED COUNT: 13.1 % (ref 11.6–14.5)
GLOBULIN SER CALC-MCNC: 2.6 G/DL (ref 2–4)
GLUCOSE SERPL-MCNC: 156 MG/DL (ref 74–99)
GP B STREP DNA BLD POS QL NAA+NON-PROBE: DETECTED
HAEM INFLU DNA BLD POS QL NAA+NON-PROBE: NOT DETECTED
HCT VFR BLD AUTO: 36.1 % (ref 36–48)
HGB BLD-MCNC: 12.2 G/DL (ref 13–16)
IMM GRANULOCYTES # BLD AUTO: 0.1 K/UL (ref 0–0.04)
IMM GRANULOCYTES NFR BLD AUTO: 1 % (ref 0–0.5)
INR PPP: 2 (ref 0.8–1.2)
K OXYTOCA DNA BLD POS QL NAA+NON-PROBE: NOT DETECTED
KLEBSIELLA SP DNA BLD POS QL NAA+NON-PRB: NOT DETECTED
KLEBSIELLA SP DNA BLD POS QL NAA+NON-PRB: NOT DETECTED
L MONOCYTOG DNA BLD POS QL NAA+NON-PROBE: NOT DETECTED
LACTATE SERPL-SCNC: 1.4 MMOL/L (ref 0.4–2)
LYMPHOCYTES # BLD: 0.7 K/UL (ref 0.9–3.6)
LYMPHOCYTES NFR BLD: 7 % (ref 21–52)
MAGNESIUM SERPL-MCNC: 2 MG/DL (ref 1.6–2.6)
MCH RBC QN AUTO: 32.7 PG (ref 24–34)
MCHC RBC AUTO-ENTMCNC: 33.8 G/DL (ref 31–37)
MCV RBC AUTO: 96.8 FL (ref 78–100)
MONOCYTES # BLD: 0.8 K/UL (ref 0.05–1.2)
MONOCYTES NFR BLD: 8 % (ref 3–10)
N MEN DNA BLD POS QL NAA+NON-PROBE: NOT DETECTED
NEUTS SEG # BLD: 8.4 K/UL (ref 1.8–8)
NEUTS SEG NFR BLD: 84 % (ref 40–73)
NRBC # BLD: 0 K/UL (ref 0–0.01)
NRBC BLD-RTO: 0 PER 100 WBC
NT PRO BNP: 629 PG/ML (ref 0–900)
P AERUGINOSA DNA BLD POS NAA+NON-PROBE: NOT DETECTED
PLATELET # BLD AUTO: 137 K/UL (ref 135–420)
PMV BLD AUTO: 10.8 FL (ref 9.2–11.8)
POTASSIUM SERPL-SCNC: 3.1 MMOL/L (ref 3.5–5.5)
PROCALCITONIN SERPL-MCNC: 5.33 NG/ML
PROT SERPL-MCNC: 5.8 G/DL (ref 6.4–8.2)
PROTEUS SP DNA BLD POS QL NAA+NON-PROBE: NOT DETECTED
PROTHROMBIN TIME: 22.8 SEC (ref 11.5–15.2)
RBC # BLD AUTO: 3.73 M/UL (ref 4.35–5.65)
RESISTANT GENE TARGETS: ABNORMAL
S AUREUS DNA BLD POS QL NAA+NON-PROBE: NOT DETECTED
S AUREUS+CONS DNA BLD POS NAA+NON-PROBE: NOT DETECTED
S EPIDERMIDIS DNA BLD POS QL NAA+NON-PRB: NOT DETECTED
S LUGDUNENSIS DNA BLD POS QL NAA+NON-PRB: NOT DETECTED
S MALTOPHILIA DNA BLD POS QL NAA+NON-PRB: NOT DETECTED
S MARCESCENS DNA BLD POS NAA+NON-PROBE: NOT DETECTED
S PNEUM DNA BLD POS QL NAA+NON-PROBE: NOT DETECTED
S PYO DNA BLD POS QL NAA+NON-PROBE: NOT DETECTED
SALMONELLA DNA BLD POS QL NAA+NON-PROBE: NOT DETECTED
SODIUM SERPL-SCNC: 138 MMOL/L (ref 136–145)
STREPTOCOCCUS DNA BLD POS NAA+NON-PROBE: DETECTED
TROPONIN I SERPL HS-MCNC: 26 NG/L (ref 0–78)
WBC # BLD AUTO: 10.1 K/UL (ref 4.6–13.2)

## 2023-05-24 PROCEDURE — 93010 ELECTROCARDIOGRAM REPORT: CPT | Performed by: INTERNAL MEDICINE

## 2023-05-24 PROCEDURE — 99223 1ST HOSP IP/OBS HIGH 75: CPT | Performed by: PHYSICIAN ASSISTANT

## 2023-05-24 PROCEDURE — 84145 PROCALCITONIN (PCT): CPT

## 2023-05-24 PROCEDURE — 87040 BLOOD CULTURE FOR BACTERIA: CPT

## 2023-05-24 PROCEDURE — 83880 ASSAY OF NATRIURETIC PEPTIDE: CPT

## 2023-05-24 PROCEDURE — 85610 PROTHROMBIN TIME: CPT

## 2023-05-24 PROCEDURE — 96375 TX/PRO/DX INJ NEW DRUG ADDON: CPT

## 2023-05-24 PROCEDURE — 71045 X-RAY EXAM CHEST 1 VIEW: CPT

## 2023-05-24 PROCEDURE — 99285 EMERGENCY DEPT VISIT HI MDM: CPT

## 2023-05-24 PROCEDURE — 96374 THER/PROPH/DIAG INJ IV PUSH: CPT

## 2023-05-24 PROCEDURE — 84484 ASSAY OF TROPONIN QUANT: CPT

## 2023-05-24 PROCEDURE — 83735 ASSAY OF MAGNESIUM: CPT

## 2023-05-24 PROCEDURE — 1100000000 HC RM PRIVATE

## 2023-05-24 PROCEDURE — 6370000000 HC RX 637 (ALT 250 FOR IP): Performed by: PHYSICIAN ASSISTANT

## 2023-05-24 PROCEDURE — 80053 COMPREHEN METABOLIC PANEL: CPT

## 2023-05-24 PROCEDURE — 93970 EXTREMITY STUDY: CPT

## 2023-05-24 PROCEDURE — 93005 ELECTROCARDIOGRAM TRACING: CPT

## 2023-05-24 PROCEDURE — 83605 ASSAY OF LACTIC ACID: CPT

## 2023-05-24 PROCEDURE — 6360000002 HC RX W HCPCS

## 2023-05-24 PROCEDURE — 85025 COMPLETE CBC W/AUTO DIFF WBC: CPT

## 2023-05-24 PROCEDURE — 2580000003 HC RX 258

## 2023-05-24 PROCEDURE — 36415 COLL VENOUS BLD VENIPUNCTURE: CPT

## 2023-05-24 PROCEDURE — 2580000003 HC RX 258: Performed by: PHYSICIAN ASSISTANT

## 2023-05-24 RX ORDER — SODIUM CHLORIDE 0.9 % (FLUSH) 0.9 %
5-40 SYRINGE (ML) INJECTION EVERY 12 HOURS SCHEDULED
Status: DISCONTINUED | OUTPATIENT
Start: 2023-05-24 | End: 2023-05-26 | Stop reason: HOSPADM

## 2023-05-24 RX ORDER — POTASSIUM CHLORIDE 20 MEQ/1
40 TABLET, EXTENDED RELEASE ORAL ONCE
Status: COMPLETED | OUTPATIENT
Start: 2023-05-24 | End: 2023-05-24

## 2023-05-24 RX ORDER — HYDROCHLOROTHIAZIDE 25 MG/1
12.5 TABLET ORAL DAILY
Status: DISCONTINUED | OUTPATIENT
Start: 2023-05-25 | End: 2023-05-26 | Stop reason: HOSPADM

## 2023-05-24 RX ORDER — HYDROCODONE BITARTRATE AND ACETAMINOPHEN 5; 325 MG/1; MG/1
1 TABLET ORAL EVERY 6 HOURS PRN
Status: DISCONTINUED | OUTPATIENT
Start: 2023-05-24 | End: 2023-05-26 | Stop reason: HOSPADM

## 2023-05-24 RX ORDER — ONDANSETRON 2 MG/ML
4 INJECTION INTRAMUSCULAR; INTRAVENOUS EVERY 6 HOURS PRN
Status: DISCONTINUED | OUTPATIENT
Start: 2023-05-24 | End: 2023-05-26 | Stop reason: HOSPADM

## 2023-05-24 RX ORDER — ACETAMINOPHEN 500 MG
1000 TABLET ORAL EVERY 8 HOURS PRN
Status: DISCONTINUED | OUTPATIENT
Start: 2023-05-24 | End: 2023-05-26 | Stop reason: HOSPADM

## 2023-05-24 RX ORDER — METAXALONE 800 MG/1
800 TABLET ORAL 3 TIMES DAILY PRN
Status: DISCONTINUED | OUTPATIENT
Start: 2023-05-24 | End: 2023-05-26 | Stop reason: HOSPADM

## 2023-05-24 RX ORDER — LISINOPRIL 20 MG/1
20 TABLET ORAL DAILY
Status: DISCONTINUED | OUTPATIENT
Start: 2023-05-25 | End: 2023-05-26 | Stop reason: HOSPADM

## 2023-05-24 RX ORDER — BISACODYL 10 MG
10 SUPPOSITORY, RECTAL RECTAL DAILY PRN
Status: DISCONTINUED | OUTPATIENT
Start: 2023-05-24 | End: 2023-05-26 | Stop reason: HOSPADM

## 2023-05-24 RX ORDER — GABAPENTIN 300 MG/1
300 CAPSULE ORAL 2 TIMES DAILY
Status: DISCONTINUED | OUTPATIENT
Start: 2023-05-24 | End: 2023-05-26 | Stop reason: HOSPADM

## 2023-05-24 RX ORDER — POLYETHYLENE GLYCOL 3350 17 G/17G
17 POWDER, FOR SOLUTION ORAL DAILY PRN
Status: DISCONTINUED | OUTPATIENT
Start: 2023-05-24 | End: 2023-05-26 | Stop reason: HOSPADM

## 2023-05-24 RX ORDER — ACETAMINOPHEN 650 MG/1
650 SUPPOSITORY RECTAL EVERY 6 HOURS PRN
Status: DISCONTINUED | OUTPATIENT
Start: 2023-05-24 | End: 2023-05-26 | Stop reason: HOSPADM

## 2023-05-24 RX ORDER — LISINOPRIL AND HYDROCHLOROTHIAZIDE 20; 12.5 MG/1; MG/1
1 TABLET ORAL DAILY
Status: DISCONTINUED | OUTPATIENT
Start: 2023-05-25 | End: 2023-05-24 | Stop reason: SDUPTHER

## 2023-05-24 RX ORDER — SODIUM CHLORIDE 9 MG/ML
INJECTION, SOLUTION INTRAVENOUS PRN
Status: DISCONTINUED | OUTPATIENT
Start: 2023-05-24 | End: 2023-05-26 | Stop reason: HOSPADM

## 2023-05-24 RX ORDER — SODIUM CHLORIDE 0.9 % (FLUSH) 0.9 %
5-40 SYRINGE (ML) INJECTION PRN
Status: DISCONTINUED | OUTPATIENT
Start: 2023-05-24 | End: 2023-05-26 | Stop reason: HOSPADM

## 2023-05-24 RX ORDER — PANTOPRAZOLE SODIUM 20 MG/1
20 TABLET, DELAYED RELEASE ORAL
Status: DISCONTINUED | OUTPATIENT
Start: 2023-05-25 | End: 2023-05-26 | Stop reason: HOSPADM

## 2023-05-24 RX ORDER — ACETAMINOPHEN 325 MG/1
650 TABLET ORAL EVERY 6 HOURS PRN
Status: DISCONTINUED | OUTPATIENT
Start: 2023-05-24 | End: 2023-05-24

## 2023-05-24 RX ORDER — WARFARIN SODIUM 10 MG/1
10 TABLET ORAL
Status: COMPLETED | OUTPATIENT
Start: 2023-05-24 | End: 2023-05-24

## 2023-05-24 RX ORDER — ACETAMINOPHEN 650 MG/1
650 SUPPOSITORY RECTAL EVERY 6 HOURS PRN
Status: DISCONTINUED | OUTPATIENT
Start: 2023-05-24 | End: 2023-05-24

## 2023-05-24 RX ORDER — MONTELUKAST SODIUM 10 MG/1
10 TABLET ORAL DAILY
Status: DISCONTINUED | OUTPATIENT
Start: 2023-05-24 | End: 2023-05-26 | Stop reason: HOSPADM

## 2023-05-24 RX ORDER — LABETALOL 100 MG/1
100 TABLET, FILM COATED ORAL 2 TIMES DAILY
Status: DISCONTINUED | OUTPATIENT
Start: 2023-05-24 | End: 2023-05-26 | Stop reason: HOSPADM

## 2023-05-24 RX ORDER — ONDANSETRON 4 MG/1
4 TABLET, ORALLY DISINTEGRATING ORAL EVERY 8 HOURS PRN
Status: DISCONTINUED | OUTPATIENT
Start: 2023-05-24 | End: 2023-05-26 | Stop reason: HOSPADM

## 2023-05-24 RX ADMIN — WATER 1000 MG: 1 INJECTION INTRAMUSCULAR; INTRAVENOUS; SUBCUTANEOUS at 13:20

## 2023-05-24 RX ADMIN — GABAPENTIN 300 MG: 300 CAPSULE ORAL at 20:57

## 2023-05-24 RX ADMIN — MONTELUKAST 10 MG: 10 TABLET, FILM COATED ORAL at 17:06

## 2023-05-24 RX ADMIN — ACETAMINOPHEN 325MG 650 MG: 325 TABLET ORAL at 17:06

## 2023-05-24 RX ADMIN — WARFARIN SODIUM 10 MG: 10 TABLET ORAL at 17:06

## 2023-05-24 RX ADMIN — LABETALOL HYDROCHLORIDE 100 MG: 100 TABLET, FILM COATED ORAL at 21:01

## 2023-05-24 RX ADMIN — VANCOMYCIN HYDROCHLORIDE 2000 MG: 10 INJECTION, POWDER, LYOPHILIZED, FOR SOLUTION INTRAVENOUS at 13:24

## 2023-05-24 RX ADMIN — SODIUM CHLORIDE, PRESERVATIVE FREE 10 ML: 5 INJECTION INTRAVENOUS at 20:58

## 2023-05-24 RX ADMIN — POTASSIUM CHLORIDE 40 MEQ: 1500 TABLET, EXTENDED RELEASE ORAL at 17:06

## 2023-05-24 ASSESSMENT — PAIN - FUNCTIONAL ASSESSMENT
PAIN_FUNCTIONAL_ASSESSMENT: 0-10
PAIN_FUNCTIONAL_ASSESSMENT: NONE - DENIES PAIN
PAIN_FUNCTIONAL_ASSESSMENT: ACTIVITIES ARE NOT PREVENTED
PAIN_FUNCTIONAL_ASSESSMENT: 0-10

## 2023-05-24 ASSESSMENT — ENCOUNTER SYMPTOMS
RHINORRHEA: 0
COLOR CHANGE: 1
ABDOMINAL DISTENTION: 0
DIARRHEA: 0
NAUSEA: 0
VOMITING: 0
ABDOMINAL PAIN: 0

## 2023-05-24 ASSESSMENT — PAIN DESCRIPTION - ORIENTATION
ORIENTATION: POSTERIOR
ORIENTATION: LEFT

## 2023-05-24 ASSESSMENT — PAIN SCALES - GENERAL
PAINLEVEL_OUTOF10: 7
PAINLEVEL_OUTOF10: 8
PAINLEVEL_OUTOF10: 8
PAINLEVEL_OUTOF10: 0
PAINLEVEL_OUTOF10: 3
PAINLEVEL_OUTOF10: 3

## 2023-05-24 ASSESSMENT — PAIN DESCRIPTION - DESCRIPTORS
DESCRIPTORS: ACHING
DESCRIPTORS: ACHING

## 2023-05-24 ASSESSMENT — PAIN DESCRIPTION - LOCATION
LOCATION: HEAD
LOCATION: LEG
LOCATION: HEAD

## 2023-05-24 NOTE — ED TRIAGE NOTES
Pt to triage c/o low grade fever, left leg redness and swelling. States she was seen her yesterday, diagnosed with cellulitis, and called back today for positive blood cultures.

## 2023-05-24 NOTE — ED NOTES
Positive blood culture call from lab. Called and spoke with the patient. He states that he did still have some fevers and is feeling better today. He does have trouble getting up and walking around. Discussed with him that with his positive blood culture would recommend returning to the emergency department for possible admission and IV antibiotics. Patient and his wife are in agreement.      Wendy Harrell MD  05/23/23 0639

## 2023-05-24 NOTE — ED NOTES
TRANSFER - OUT REPORT:    Verbal report given to [facility], RN on Nicki  being transferred to [facility]  for routine progression of patient care       Report consisted of patient's Situation, Background, Assessment and   Recommendations(SBAR). Information from the following report(s) ED SBAR was reviewed with the receiving nurse. [place] Assessment: Presents to emergency department  because of falls (Syncope, seizure, or loss of consciousness): No, Age > 79: No, Altered Mental Status, Intoxication with alcohol or substance confusion (Disorientation, impaired judgment, poor safety awaremess, or inability to follow instructions): No, Impaired Mobility: Ambulates or transfers with assistive devices or assistance; Unable to ambulate or transer.: Yes, Nursing Judgement: Yes  Lines:   Peripheral IV 05/24/23 Right; Lower Forearm (Active)   Site Assessment Clean, dry & intact 05/24/23 0950   Line Status Blood return noted;Specimen collected 05/24/23 0950   Dressing Type Securing device 05/24/23 0950        Opportunity for questions and clarification was provided.       Patient transported with:  Transportation          Manisha Gutierrez RN  05/24/23 7013

## 2023-05-24 NOTE — CONSULTS
[facility]  (A Division of [address] [place] [address])      Consultation Note      Date of Admission: 5/24/2023    Date of Note: 5/24/2023      Reason for Referral: Sepsis  Referring Physician: RIANA Wallace from this admission:   5/23 blood cultures:  2 out of 4 strep agalactiae  /24 blood culture: Pending    Current Antimicrobials:    Prior Antimicrobials:  Vancomycin  Ceftriaxone        Assessment:         Group B strep sepsis: From 2 out of 4 bottles. May or may not be a contaminant. No change in vital signs, no documented fevers, WBCs are 10. Lactic acid ranges between 1.4 and 1.6  Left lower extremity pain and erythema      Plan:   Follow-up 5/23 blood cultures until finalized  Repeat blood cultures from 5/24 pending  Ceftriaxone for strep agalactiae  1 dose of vancomycin while cultures are pending  Trend CBC, CMP  Echocardiogram to rule out endocarditis although I suspect this is unlikely    DO Cornelia Wynn [facility]  [address], [address] [facility] [address]  Office: [phone], Ext [phone]      Lines / Catheters:  Peripheral    HPI:  ***         Past Medical History:   Diagnosis Date    Arthritis     Chronic pain     knee and shoulder    DVT (deep venous thrombosis) ([address] 2000    POST BACK SX. 2- LEFT LEG    DVT (deep venous thrombosis) ([address] 1992    post sx.   R LEG    GERD (gastroesophageal reflux disease)     Headache(784.0)     everyday    High cholesterol     Hypertension     Prostate cancer ([facility] [address] 2018    Pure hypercholesterolemia     Spinal stenosis     Thromboembolus [facility])      Past Surgical History:   Procedure Laterality Date    HEENT Right 09/11/2018    eye surgery, macular     LUMBAR LAMINECTOMY  2000    LUMBAR LAMINECTOMY  1992    ORTHOPEDIC SURGERY  06-25-12    Right foot with excision of bursa and adipose tissue from fifth metatarsal base by Dr. Norbert Omalley Left 03/09/2022

## 2023-05-24 NOTE — ED PROVIDER NOTES
homophones, and other interpretive errors are inadvertently transcribed by the computer software. Please disregard these errors. Please excuse any errors that have escaped final proofreading. Thank you.         Palak Padilla PA-C  05/24/23 2073

## 2023-05-25 ENCOUNTER — APPOINTMENT (OUTPATIENT)
Facility: HOSPITAL | Age: 68
DRG: 603 | End: 2023-05-25
Payer: MEDICARE

## 2023-05-25 LAB
ANION GAP SERPL CALC-SCNC: 4 MMOL/L (ref 3–18)
BASOPHILS # BLD: 0 K/UL (ref 0–0.1)
BASOPHILS NFR BLD: 1 % (ref 0–2)
BUN SERPL-MCNC: 12 MG/DL (ref 7–18)
BUN/CREAT SERPL: 14 (ref 12–20)
CALCIUM SERPL-MCNC: 8.7 MG/DL (ref 8.5–10.1)
CHLORIDE SERPL-SCNC: 109 MMOL/L (ref 100–111)
CO2 SERPL-SCNC: 27 MMOL/L (ref 21–32)
CREAT SERPL-MCNC: 0.84 MG/DL (ref 0.6–1.3)
DIFFERENTIAL METHOD BLD: ABNORMAL
ECHO AO ASC DIAM: 3.3 CM
ECHO AO ASCENDING AORTA INDEX: 1.49 CM/M2
ECHO AO ROOT DIAM: 3.4 CM
ECHO AO ROOT INDEX: 1.53 CM/M2
ECHO AV AREA PEAK VELOCITY: 2.5 CM2
ECHO AV AREA VTI: 2.6 CM2
ECHO AV AREA/BSA PEAK VELOCITY: 1.1 CM2/M2
ECHO AV AREA/BSA VTI: 1.2 CM2/M2
ECHO AV MEAN GRADIENT: 4 MMHG
ECHO AV MEAN VELOCITY: 0.9 M/S
ECHO AV PEAK GRADIENT: 7 MMHG
ECHO AV PEAK VELOCITY: 1.3 M/S
ECHO AV VELOCITY RATIO: 0.77
ECHO AV VTI: 30.1 CM
ECHO BSA: 2.28 M2
ECHO BSA: 2.28 M2
ECHO EST RA PRESSURE: 8 MMHG
ECHO LA VOL 2C: 53 ML (ref 18–58)
ECHO LA VOL 2C: 55 ML (ref 18–58)
ECHO LA VOL 4C: 64 ML (ref 18–58)
ECHO LA VOL 4C: 71 ML (ref 18–58)
ECHO LA VOLUME AREA LENGTH: 64 ML
ECHO LA VOLUME INDEX AREA LENGTH: 29 ML/M2 (ref 16–34)
ECHO LV E' LATERAL VELOCITY: 9 CM/S
ECHO LV E' SEPTAL VELOCITY: 8 CM/S
ECHO LV FRACTIONAL SHORTENING: 25 % (ref 28–44)
ECHO LV INTERNAL DIMENSION DIASTOLE INDEX: 1.98 CM/M2
ECHO LV INTERNAL DIMENSION DIASTOLIC: 4.4 CM (ref 4.2–5.9)
ECHO LV INTERNAL DIMENSION SYSTOLIC INDEX: 1.49 CM/M2
ECHO LV INTERNAL DIMENSION SYSTOLIC: 3.3 CM
ECHO LV IVSD: 1.1 CM (ref 0.6–1)
ECHO LV MASS 2D: 168.9 G (ref 88–224)
ECHO LV MASS INDEX 2D: 76.1 G/M2 (ref 49–115)
ECHO LV POSTERIOR WALL DIASTOLIC: 1.1 CM (ref 0.6–1)
ECHO LV RELATIVE WALL THICKNESS RATIO: 0.5
ECHO LVOT AREA: 3.5 CM2
ECHO LVOT AV VTI INDEX: 0.8
ECHO LVOT DIAM: 2.1 CM
ECHO LVOT MEAN GRADIENT: 3 MMHG
ECHO LVOT PEAK GRADIENT: 4 MMHG
ECHO LVOT PEAK VELOCITY: 1 M/S
ECHO LVOT STROKE VOLUME INDEX: 37.7 ML/M2
ECHO LVOT SV: 83.8 ML
ECHO LVOT VTI: 24.2 CM
ECHO MV A VELOCITY: 0.91 M/S
ECHO MV E DECELERATION TIME (DT): 144.8 MS
ECHO MV E VELOCITY: 0.66 M/S
ECHO MV E/A RATIO: 0.73
ECHO MV E/E' LATERAL: 7.33
ECHO MV E/E' RATIO (AVERAGED): 7.79
ECHO MV E/E' SEPTAL: 8.25
ECHO PV MAX VELOCITY: 1.2 M/S
ECHO PV PEAK GRADIENT: 5 MMHG
ECHO RA AREA 4C: 12.3 CM2
ECHO RIGHT VENTRICULAR SYSTOLIC PRESSURE (RVSP): 30 MMHG
ECHO RV BASAL DIMENSION: 3.5 CM
ECHO RV FREE WALL PEAK S': 11 CM/S
ECHO RV TAPSE: 1.7 CM (ref 1.7–?)
ECHO TV REGURGITANT MAX VELOCITY: 2.32 M/S
ECHO TV REGURGITANT PEAK GRADIENT: 22 MMHG
EOSINOPHIL # BLD: 0.2 K/UL (ref 0–0.4)
EOSINOPHIL NFR BLD: 2 % (ref 0–5)
ERYTHROCYTE [DISTWIDTH] IN BLOOD BY AUTOMATED COUNT: 13.2 % (ref 11.6–14.5)
GLUCOSE SERPL-MCNC: 104 MG/DL (ref 74–99)
HCT VFR BLD AUTO: 35.2 % (ref 36–48)
HGB BLD-MCNC: 11.7 G/DL (ref 13–16)
IMM GRANULOCYTES # BLD AUTO: 0 K/UL (ref 0–0.04)
IMM GRANULOCYTES NFR BLD AUTO: 0 % (ref 0–0.5)
INR PPP: 1.9 (ref 0.8–1.2)
LYMPHOCYTES # BLD: 1 K/UL (ref 0.9–3.6)
LYMPHOCYTES NFR BLD: 14 % (ref 21–52)
MAGNESIUM SERPL-MCNC: 2.3 MG/DL (ref 1.6–2.6)
MCH RBC QN AUTO: 32.5 PG (ref 24–34)
MCHC RBC AUTO-ENTMCNC: 33.2 G/DL (ref 31–37)
MCV RBC AUTO: 97.8 FL (ref 78–100)
MONOCYTES # BLD: 1.1 K/UL (ref 0.05–1.2)
MONOCYTES NFR BLD: 16 % (ref 3–10)
NEUTS SEG # BLD: 4.7 K/UL (ref 1.8–8)
NEUTS SEG NFR BLD: 67 % (ref 40–73)
NRBC # BLD: 0 K/UL (ref 0–0.01)
NRBC BLD-RTO: 0 PER 100 WBC
PLATELET # BLD AUTO: 135 K/UL (ref 135–420)
PMV BLD AUTO: 11 FL (ref 9.2–11.8)
POTASSIUM SERPL-SCNC: 3.5 MMOL/L (ref 3.5–5.5)
PROTHROMBIN TIME: 22.1 SEC (ref 11.5–15.2)
RBC # BLD AUTO: 3.6 M/UL (ref 4.35–5.65)
SODIUM SERPL-SCNC: 140 MMOL/L (ref 136–145)
WBC # BLD AUTO: 7 K/UL (ref 4.6–13.2)

## 2023-05-25 PROCEDURE — 93306 TTE W/DOPPLER COMPLETE: CPT | Performed by: INTERNAL MEDICINE

## 2023-05-25 PROCEDURE — C8929 TTE W OR WO FOL WCON,DOPPLER: HCPCS

## 2023-05-25 PROCEDURE — 85610 PROTHROMBIN TIME: CPT

## 2023-05-25 PROCEDURE — 2580000003 HC RX 258: Performed by: INTERNAL MEDICINE

## 2023-05-25 PROCEDURE — 36415 COLL VENOUS BLD VENIPUNCTURE: CPT

## 2023-05-25 PROCEDURE — 83735 ASSAY OF MAGNESIUM: CPT

## 2023-05-25 PROCEDURE — 99232 SBSQ HOSP IP/OBS MODERATE 35: CPT | Performed by: INTERNAL MEDICINE

## 2023-05-25 PROCEDURE — 6370000000 HC RX 637 (ALT 250 FOR IP): Performed by: HOSPITALIST

## 2023-05-25 PROCEDURE — 93970 EXTREMITY STUDY: CPT | Performed by: SURGERY

## 2023-05-25 PROCEDURE — 6370000000 HC RX 637 (ALT 250 FOR IP): Performed by: PHYSICIAN ASSISTANT

## 2023-05-25 PROCEDURE — 2580000003 HC RX 258: Performed by: PHYSICIAN ASSISTANT

## 2023-05-25 PROCEDURE — 1100000000 HC RM PRIVATE

## 2023-05-25 PROCEDURE — 6360000004 HC RX CONTRAST MEDICATION: Performed by: INTERNAL MEDICINE

## 2023-05-25 PROCEDURE — 6360000002 HC RX W HCPCS: Performed by: INTERNAL MEDICINE

## 2023-05-25 PROCEDURE — 85025 COMPLETE CBC W/AUTO DIFF WBC: CPT

## 2023-05-25 PROCEDURE — 6360000002 HC RX W HCPCS: Performed by: PHYSICIAN ASSISTANT

## 2023-05-25 PROCEDURE — 80048 BASIC METABOLIC PNL TOTAL CA: CPT

## 2023-05-25 PROCEDURE — 6370000000 HC RX 637 (ALT 250 FOR IP): Performed by: INTERNAL MEDICINE

## 2023-05-25 RX ORDER — WARFARIN SODIUM 10 MG/1
10 TABLET ORAL
Status: COMPLETED | OUTPATIENT
Start: 2023-05-25 | End: 2023-05-25

## 2023-05-25 RX ADMIN — MONTELUKAST 10 MG: 10 TABLET, FILM COATED ORAL at 10:02

## 2023-05-25 RX ADMIN — PERFLUTREN 2 ML: 6.52 INJECTION, SUSPENSION INTRAVENOUS at 09:57

## 2023-05-25 RX ADMIN — VANCOMYCIN HYDROCHLORIDE 1000 MG: 1 INJECTION, POWDER, LYOPHILIZED, FOR SOLUTION INTRAVENOUS at 02:27

## 2023-05-25 RX ADMIN — LISINOPRIL 20 MG: 20 TABLET ORAL at 10:02

## 2023-05-25 RX ADMIN — SODIUM CHLORIDE, PRESERVATIVE FREE 10 ML: 5 INJECTION INTRAVENOUS at 21:00

## 2023-05-25 RX ADMIN — METAXALONE 800 MG: 800 TABLET ORAL at 21:40

## 2023-05-25 RX ADMIN — SODIUM CHLORIDE, PRESERVATIVE FREE 10 ML: 5 INJECTION INTRAVENOUS at 10:06

## 2023-05-25 RX ADMIN — ACETAMINOPHEN 1000 MG: 500 TABLET ORAL at 17:38

## 2023-05-25 RX ADMIN — VANCOMYCIN HYDROCHLORIDE 1000 MG: 1 INJECTION, POWDER, LYOPHILIZED, FOR SOLUTION INTRAVENOUS at 13:32

## 2023-05-25 RX ADMIN — ACETAMINOPHEN 1000 MG: 500 TABLET ORAL at 10:04

## 2023-05-25 RX ADMIN — GABAPENTIN 300 MG: 300 CAPSULE ORAL at 10:03

## 2023-05-25 RX ADMIN — PANTOPRAZOLE SODIUM 20 MG: 20 TABLET, DELAYED RELEASE ORAL at 08:18

## 2023-05-25 RX ADMIN — CEFTRIAXONE SODIUM 2000 MG: 2 INJECTION, POWDER, FOR SOLUTION INTRAMUSCULAR; INTRAVENOUS at 12:46

## 2023-05-25 RX ADMIN — ACETAMINOPHEN 1000 MG: 500 TABLET ORAL at 02:27

## 2023-05-25 RX ADMIN — LABETALOL HYDROCHLORIDE 100 MG: 100 TABLET, FILM COATED ORAL at 10:03

## 2023-05-25 RX ADMIN — WARFARIN SODIUM 10 MG: 10 TABLET ORAL at 17:39

## 2023-05-25 RX ADMIN — HYDROCHLOROTHIAZIDE 12.5 MG: 25 TABLET ORAL at 10:03

## 2023-05-25 RX ADMIN — LABETALOL HYDROCHLORIDE 100 MG: 100 TABLET, FILM COATED ORAL at 21:37

## 2023-05-25 RX ADMIN — GABAPENTIN 300 MG: 300 CAPSULE ORAL at 21:37

## 2023-05-25 ASSESSMENT — PAIN DESCRIPTION - DESCRIPTORS
DESCRIPTORS: ACHING
DESCRIPTORS: ACHING
DESCRIPTORS: BURNING

## 2023-05-25 ASSESSMENT — PAIN DESCRIPTION - LOCATION
LOCATION: LEG;HEAD
LOCATION: HEAD
LOCATION: HEAD;LEG

## 2023-05-25 ASSESSMENT — PAIN SCALES - GENERAL
PAINLEVEL_OUTOF10: 0
PAINLEVEL_OUTOF10: 5
PAINLEVEL_OUTOF10: 6
PAINLEVEL_OUTOF10: 2
PAINLEVEL_OUTOF10: 5

## 2023-05-25 ASSESSMENT — PAIN DESCRIPTION - ORIENTATION
ORIENTATION: LEFT;MID
ORIENTATION: LEFT

## 2023-05-26 VITALS
BODY MASS INDEX: 33.67 KG/M2 | TEMPERATURE: 97.6 F | HEIGHT: 70 IN | OXYGEN SATURATION: 94 % | HEART RATE: 74 BPM | DIASTOLIC BLOOD PRESSURE: 76 MMHG | WEIGHT: 235.2 LBS | SYSTOLIC BLOOD PRESSURE: 135 MMHG | RESPIRATION RATE: 18 BRPM

## 2023-05-26 LAB
ANION GAP SERPL CALC-SCNC: 5 MMOL/L (ref 3–18)
BACTERIA SPEC CULT: ABNORMAL
BASOPHILS # BLD: 0.1 K/UL (ref 0–0.1)
BASOPHILS NFR BLD: 1 % (ref 0–2)
BUN SERPL-MCNC: 11 MG/DL (ref 7–18)
BUN/CREAT SERPL: 13 (ref 12–20)
CALCIUM SERPL-MCNC: 8.9 MG/DL (ref 8.5–10.1)
CHLORIDE SERPL-SCNC: 111 MMOL/L (ref 100–111)
CO2 SERPL-SCNC: 27 MMOL/L (ref 21–32)
CREAT SERPL-MCNC: 0.87 MG/DL (ref 0.6–1.3)
DIFFERENTIAL METHOD BLD: ABNORMAL
EOSINOPHIL # BLD: 0.3 K/UL (ref 0–0.4)
EOSINOPHIL NFR BLD: 5 % (ref 0–5)
ERYTHROCYTE [DISTWIDTH] IN BLOOD BY AUTOMATED COUNT: 13.2 % (ref 11.6–14.5)
GLUCOSE SERPL-MCNC: 96 MG/DL (ref 74–99)
GRAM STN SPEC: ABNORMAL
GRAM STN SPEC: ABNORMAL
HCT VFR BLD AUTO: 37.8 % (ref 36–48)
HGB BLD-MCNC: 12.8 G/DL (ref 13–16)
IMM GRANULOCYTES # BLD AUTO: 0 K/UL (ref 0–0.04)
IMM GRANULOCYTES NFR BLD AUTO: 0 % (ref 0–0.5)
INR PPP: 1.8 (ref 0.8–1.2)
LYMPHOCYTES # BLD: 1.1 K/UL (ref 0.9–3.6)
LYMPHOCYTES NFR BLD: 22 % (ref 21–52)
MCH RBC QN AUTO: 33.1 PG (ref 24–34)
MCHC RBC AUTO-ENTMCNC: 33.9 G/DL (ref 31–37)
MCV RBC AUTO: 97.7 FL (ref 78–100)
MONOCYTES # BLD: 0.6 K/UL (ref 0.05–1.2)
MONOCYTES NFR BLD: 13 % (ref 3–10)
NEUTS SEG # BLD: 2.8 K/UL (ref 1.8–8)
NEUTS SEG NFR BLD: 59 % (ref 40–73)
NRBC # BLD: 0 K/UL (ref 0–0.01)
NRBC BLD-RTO: 0 PER 100 WBC
PLATELET # BLD AUTO: 153 K/UL (ref 135–420)
PMV BLD AUTO: 10.7 FL (ref 9.2–11.8)
POTASSIUM SERPL-SCNC: 3.8 MMOL/L (ref 3.5–5.5)
PROTHROMBIN TIME: 21 SEC (ref 11.5–15.2)
RBC # BLD AUTO: 3.87 M/UL (ref 4.35–5.65)
SERVICE CMNT-IMP: ABNORMAL
SODIUM SERPL-SCNC: 143 MMOL/L (ref 136–145)
VANCOMYCIN SERPL-MCNC: 8.6 UG/ML (ref 5–40)
WBC # BLD AUTO: 4.8 K/UL (ref 4.6–13.2)

## 2023-05-26 PROCEDURE — 85025 COMPLETE CBC W/AUTO DIFF WBC: CPT

## 2023-05-26 PROCEDURE — 2580000003 HC RX 258: Performed by: INTERNAL MEDICINE

## 2023-05-26 PROCEDURE — 6370000000 HC RX 637 (ALT 250 FOR IP): Performed by: HOSPITALIST

## 2023-05-26 PROCEDURE — 85610 PROTHROMBIN TIME: CPT

## 2023-05-26 PROCEDURE — 6360000002 HC RX W HCPCS: Performed by: PHYSICIAN ASSISTANT

## 2023-05-26 PROCEDURE — 2580000003 HC RX 258: Performed by: PHYSICIAN ASSISTANT

## 2023-05-26 PROCEDURE — 36415 COLL VENOUS BLD VENIPUNCTURE: CPT

## 2023-05-26 PROCEDURE — 80202 ASSAY OF VANCOMYCIN: CPT

## 2023-05-26 PROCEDURE — 80048 BASIC METABOLIC PNL TOTAL CA: CPT

## 2023-05-26 PROCEDURE — 6360000002 HC RX W HCPCS: Performed by: INTERNAL MEDICINE

## 2023-05-26 PROCEDURE — 6370000000 HC RX 637 (ALT 250 FOR IP): Performed by: PHYSICIAN ASSISTANT

## 2023-05-26 RX ORDER — DOXYCYCLINE HYCLATE 100 MG
100 TABLET ORAL 2 TIMES DAILY
Qty: 14 TABLET | Refills: 0 | Status: SHIPPED | OUTPATIENT
Start: 2023-05-26 | End: 2023-06-05

## 2023-05-26 RX ORDER — WARFARIN SODIUM 10 MG/1
10 TABLET ORAL
Status: DISCONTINUED | OUTPATIENT
Start: 2023-05-26 | End: 2023-05-26 | Stop reason: HOSPADM

## 2023-05-26 RX ADMIN — ACETAMINOPHEN 1000 MG: 500 TABLET ORAL at 01:50

## 2023-05-26 RX ADMIN — ACETAMINOPHEN 1000 MG: 500 TABLET ORAL at 09:16

## 2023-05-26 RX ADMIN — GABAPENTIN 300 MG: 300 CAPSULE ORAL at 09:16

## 2023-05-26 RX ADMIN — VANCOMYCIN HYDROCHLORIDE 1250 MG: 10 INJECTION, POWDER, LYOPHILIZED, FOR SOLUTION INTRAVENOUS at 06:01

## 2023-05-26 RX ADMIN — HYDROCHLOROTHIAZIDE 12.5 MG: 25 TABLET ORAL at 09:16

## 2023-05-26 RX ADMIN — SODIUM CHLORIDE, PRESERVATIVE FREE 10 ML: 5 INJECTION INTRAVENOUS at 09:15

## 2023-05-26 RX ADMIN — LISINOPRIL 20 MG: 20 TABLET ORAL at 09:16

## 2023-05-26 RX ADMIN — CEFTRIAXONE SODIUM 2000 MG: 2 INJECTION, POWDER, FOR SOLUTION INTRAMUSCULAR; INTRAVENOUS at 15:14

## 2023-05-26 RX ADMIN — MONTELUKAST 10 MG: 10 TABLET, FILM COATED ORAL at 09:16

## 2023-05-26 RX ADMIN — PANTOPRAZOLE SODIUM 20 MG: 20 TABLET, DELAYED RELEASE ORAL at 06:01

## 2023-05-26 RX ADMIN — LABETALOL HYDROCHLORIDE 100 MG: 100 TABLET, FILM COATED ORAL at 09:16

## 2023-05-26 ASSESSMENT — PAIN DESCRIPTION - DESCRIPTORS: DESCRIPTORS: ACHING

## 2023-05-26 ASSESSMENT — PAIN DESCRIPTION - LOCATION: LOCATION: ARM

## 2023-05-26 ASSESSMENT — PAIN SCALES - GENERAL
PAINLEVEL_OUTOF10: 0
PAINLEVEL_OUTOF10: 5
PAINLEVEL_OUTOF10: 0
PAINLEVEL_OUTOF10: 0
PAINLEVEL_OUTOF10: 5
PAINLEVEL_OUTOF10: 0

## 2023-05-26 ASSESSMENT — PAIN SCALES - WONG BAKER: WONGBAKER_NUMERICALRESPONSE: 0

## 2023-05-26 NOTE — CARE COORDINATION
.D/C order noted for today. Orders reviewed. No needs identified at this time. Pt's wife will transport home. CM remains available if needed.      Dayana Fergoso MSW  
05/26/23 1453   IMM Letter   IMM Letter given to Patient/Family/Significant other/Guardian/POA/by: KAROLYN delivered IMM Letter to pt and  pt's spouse, Demetrice Taveras, at bedside   IMM Letter date given: 05/26/23   IMM Letter time given: 1501           . Kim Johnson Important Message from [address]" reviewed and explained with the patient and/or representative pt's spouse, Demetrice Taveras  at bedside and signature was obtained. A signed copy provided to patient/representative. Original signed document placed in patient's chart.         Liliya Murcia MSW  Care Manager
care/goals and shares the quality data associated with the providers was provided to:     Patient Representative Name:       The Patient and/or Patient Representative Agree with the Discharge Plan?       PARIS Ramirez  Care Manager

## 2023-05-26 NOTE — DISCHARGE SUMMARY
[facility]  [facility]       Patient: Ivan Rivera Age: 77 y.o. : 1955 MR#: [id number] SSN: [id number]  PCP on record: Angie Mayo MD  Admit date: 2023  Discharge date: 2023    Consults:    -   Procedures:  -     Significant Diagnostic Studies:   -    Discharge Diagnoses:                                           Patient Active Problem List   Diagnosis    History of DVT (deep vein thrombosis)    Essential hypertension    Malignant neoplasm of prostate ([address]    Arthritis of knee    Lumbar spinal stenosis    Warfarin anticoagulation    Cervical facet joint syndrome    Pure hypercholesterolemia    Chronic tension-type headache, not intractable    Muscle spasm    Failed total knee replacement (HCC)    Esophageal reflux    Facet arthropathy, lumbar    Myofascial pain    Bronchitis    Primary osteoarthritis of left knee    DDD (degenerative disc disease), lumbar    Abdominal bloating    Muscle spasm of back    Bacteremia    Cellulitis of left lower extremity       Hospital Course by Problem   1. Today's examination of the patient revealed:     Subjective:     Objective:   VS: /66   Pulse 70   Temp 97.7 °F (36.5 °C) (Oral)   Resp 18   Ht 5' 10\" (1.778 m)   Wt 235 lb 3.2 oz (106.7 kg)   SpO2 95%   BMI 33.75 kg/m²    Tmax/24hrs: Temp (24hrs), Av.8 °F (36.6 °C), Min:97.6 °F (36.4 °C), Max:98.1 °F (36.7 °C)     Input/Output:   Intake/Output Summary (Last 24 hours) at 2023 1432  Last data filed at 2023 0604  Gross per 24 hour   Intake 600 ml   Output 1250 ml   Net -650 ml       General:    Cardiovascular:    Pulmonary:    GI:    Extremities:     Additional:      Labs:    Recent Results (from the past 24 hour(s))   Basic Metabolic Panel w/ Reflex to MG    Collection Time: 23  5:19 AM   Result Value Ref Range    Sodium 143 136 - 145 mmol/L    Potassium 3.8 3.5 - 5.5 mmol/L    Chloride 111 100 - 111 mmol/L    CO2 27

## 2023-05-26 NOTE — DISCHARGE INSTRUCTIONS
DISCHARGE SUMMARY from Nurse    PATIENT INSTRUCTIONS:    After general anesthesia or intravenous sedation, for 24 hours or while taking prescription Narcotics:  Limit your activities  Do not drive and operate hazardous machinery  Do not make important personal or business decisions  Do  not drink alcoholic beverages  If you have not urinated within 8 hours after discharge, please contact your surgeon on call. Report the following to your surgeon:  Excessive pain, swelling, redness or odor of or around the surgical area  Temperature over 100.5  Nausea and vomiting lasting longer than 4 hours or if unable to take medications  Any signs of decreased circulation or nerve impairment to extremity: change in color, persistent  numbness, tingling, coldness or increase pain  Any questions    What to do at Home:  Recommended activity: activity as tolerated,  home     If you experience any of the following symptoms  nausea, vomiting, diarrhea, shortness of breath, fever over 101, uncontrolled bleeding, dizziness, fainting or any other issues or concerns, please follow up with  primary care physician or dial 911 for emergencies. *  Please give a list of your current medications to your Primary Care Provider. *  Please update this list whenever your medications are discontinued, doses are      changed, or new medications (including over-the-counter products) are added. *  Please carry medication information at all times in case of emergency situations. These are general instructions for a healthy lifestyle:    No smoking/ No tobacco products/ Avoid exposure to second hand smoke  Surgeon General's Warning:  Quitting smoking now greatly reduces serious risk to your health.     Obesity, smoking, and sedentary lifestyle greatly increases your risk for illness    A healthy diet, regular physical exercise & weight monitoring are important for maintaining a healthy lifestyle    You may be retaining fluid if you have a

## 2023-05-28 LAB
BACTERIA SPEC CULT: NORMAL
SERVICE CMNT-IMP: NORMAL

## 2023-05-29 LAB
BACTERIA SPEC CULT: NORMAL
SERVICE CMNT-IMP: NORMAL

## 2023-05-30 LAB
BACTERIA SPEC CULT: NORMAL
BACTERIA SPEC CULT: NORMAL
SERVICE CMNT-IMP: NORMAL
SERVICE CMNT-IMP: NORMAL

## 2023-06-09 ENCOUNTER — TELEPHONE (OUTPATIENT)
Age: 68
End: 2023-06-09

## 2023-06-09 NOTE — TELEPHONE ENCOUNTER
Patient called stating he was seen at [facility] ED 5/23 and 5/24 for pain in his left knee and ended up having an infection. He is on antibiotics and was advised to follow up with Dr Jazzmine Padron. He would like to be seen next week in the afternoon if possible, the next available appt's are too far out for him. Please give pt a call back at [phone].

## 2023-06-10 NOTE — PROGRESS NOTES
conducted an initial consultation and Spiritual Assessment for Nicki, who is a 77 y.o.,male. Patients Primary Language is: [place]. According to the patients EMR Synagogue Affiliation is: [facility].     The reason the Patient came to the hospital is:   Patient Active Problem List    Diagnosis Date Noted    Bacteremia due to group B Streptococcus 05/24/2023    Cellulitis of left lower extremity 05/24/2023    Failed total knee replacement ([facility] [address] 03/09/2022    Malignant neoplasm of prostate ([facility] [address] 11/14/2018    Arthritis of knee 03/27/2018    Primary osteoarthritis of left knee 02/01/2018    Muscle spasm of back 03/07/2017    Warfarin anticoagulation 01/25/2017    Cervical facet joint syndrome 12/06/2016    Muscle spasm 12/06/2016    Myofascial pain 06/20/2016    Abdominal bloating 05/09/2016    Lumbar spinal stenosis 03/22/2016    Chronic tension-type headache, not intractable 03/07/2016    Bronchitis 03/07/2016    Essential hypertension 12/10/2015    Facet arthropathy, lumbar 10/06/2015    DDD (degenerative disc disease), lumbar 10/06/2015    History of DVT (deep vein thrombosis)     Pure hypercholesterolemia     Esophageal reflux         The  provided the following Interventions:  Initiated a relationship of care and support. Explored issues of sergei, belief, spirituality and Hoahaoism/ritual needs while hospitalized. Listened empathically. Provided chaplaincy education. Provided information about Spiritual Care Services including Advance Medical Directives. Offered prayer and assurance of continued prayers on patient's behalf. Chart reviewed. Encouraged patient and wife to discuss Advance Medical Directive and seek assistance from the Chaplains while in the hospital.    The following outcomes where achieved:  Patient shared limited information about both his medical narrative and spiritual journey/beliefs.  confirmed Patient's [employer] Affiliation.   Patient
[facility] [facility] Pharmacokinetic Monitoring Service - Vancomycin     Arnulfo Armijo is a 77 y.o. male starting on vancomycin therapy for Sepsis of Unknown Etiology, Skin and Soft Tissue Infection. Pharmacy consulted for monitoring and adjustment. Target Concentration: Goal AUC/GIRISH 400-600 mg*hr/L    Additional Antimicrobials: Ceftriaxone    Pertinent Laboratory Values:   Temp: 98.2 °F (36.8 °C), Weight - Scale: 232 lb (105.2 kg)  Recent Labs     05/23/23  0510 05/24/23  0950   CREATININE 1.06 1.03   BUN 16 15   WBC 10.4 10.1   PROCAL 0.08  --      Estimated Creatinine Clearance: 81 mL/min (based on SCr of 1.03 mg/dL). Plan:  Dosing recommendations based on Bayesian software  Start vancomycin 2000 mg IV x1 dose followed by 1000 mg IV a12 hours  Anticipated AUC of 505 and trough concentration of 16.9 at steady state  Renal labs as indicated.  BMP x3   Vancomycin concentration ordered for  N/A  Pharmacy will continue to monitor patient and adjust therapy as indicated    Thank you for the consult,  Noemy Bernstein, [facility]  5/24/2023
[facility] [facility] Pharmacokinetic Monitoring Service - Vancomycin    Indication: SSTI  Goal AUC/GIRISH: 400-600 mg*hr/L  Day of Therapy: 2  Additional Antimicrobials: CAX    Pertinent Laboratory Values: Wt Readings from Last 1 Encounters:   05/25/23 232 lb (105.2 kg)     Temp Readings from Last 1 Encounters:   05/25/23 97.9 °F (36.6 °C) (Oral)     Estimated Creatinine Clearance: 99 mL/min (based on SCr of 0.84 mg/dL).     Recent Labs     05/24/23  0950 05/25/23  0124   CREATININE 1.03 0.84   WBC 10.1 7.0     Pertinent Cultures:  Date Source Results   5/23 blood Strep in 1/2 5/24 blood NGTD   MRSA Nasal Swab: not ordered    Assessment:  Date Current Dose Level (mg/L) Timing of Level (h) AUC   5/23 2,000 mg x1 - - -   5/24 1,000 mg q12h - - -   Note: Serum concentrations collected for AUC dosing may appear elevated if collected in close proximity to the dose administered, this is not necessarily an indication of toxicity    Plan:  Increase dose from 1,000 mg q12h to 1,250 mg q12h  Ordered a level for 5/26 with AM labs  Pharmacy will continue to monitor patient and adjust therapy as indicated    Thank you for the consult,  Sandy Javier [facility]  5/25/2023
[address] [facility] Pharmacokinetic Monitoring Service - Vancomycin    Indication: SSTI  Goal AUC/GIRISH: 400-600 mg*hr/L  Day of Therapy: 3  Additional Antimicrobials: CAX    Pertinent Laboratory Values: Wt Readings from Last 1 Encounters:   05/26/23 235 lb 3.2 oz (106.7 kg)     Temp Readings from Last 1 Encounters:   05/26/23 97.6 °F (36.4 °C) (Oral)     Estimated Creatinine Clearance: 97 mL/min (based on SCr of 0.87 mg/dL).     Recent Labs     05/25/23  0124 05/26/23  0519   CREATININE 0.84 0.87   WBC 7.0 4.8     Pertinent Cultures:  Date Source Results   5/23 blood Strep in 1/2 5/24 blood NGTD   MRSA Nasal Swab: not ordered    Assessment:  Date Current Dose Level (mg/L) Timing of Level (h) AUC   5/23 2,000 mg x1 - - -   5/24 1,000 mg q12h - - -   5/25 1,250 mg q12h 8.6 16 494   Note: Serum concentrations collected for AUC dosing may appear elevated if collected in close proximity to the dose administered, this is not necessarily an indication of toxicity    Plan:  Continue current dose of 1,250 mg q12h  No level ordered at this time  Pharmacy will continue to monitor patient and adjust therapy as indicated    Thank you for the consult,  Rodolfo Yin, [address] [facility]  5/26/2023
Discharge teaching completed at bedside with patient. Opportunity provided for clarifying questions All answered to patient satisfaction. IV removed. ID removed and shredded. Patient discharged via wheelchair.
[facility] Pharmacy Dosing Services    Consult for Warfarin Dosing by Pharmacy by Salomón Ho provided for this 77 y.o.  male  for indication of History of DVT/PE (indefinite)    Dose to achieve an INR goal of 2.0 to 3.0  Home dose: 10mg daily; last dose taken on 5/23/23    Warfarin  10 (mg) to be given today at 18:00. DATE INR DOSE            Significant drug interactions: None  PT/INR Lab Results   Component Value Date/Time    INR 2.0 05/24/2023 09:50 AM    INR 1.7 03/22/2022 10:55 AM       Platelets Lab Results   Component Value Date/Time     05/24/2023 09:50 AM      H/H Lab Results   Component Value Date/Time    HGB 12.2 05/24/2023 09:50 AM        Pharmacy to follow daily and will provide subsequent Warfarin dosing based on clinical status.      Signed Jacklin Claude, [address] [facility] Hospitalist Group  Progress Note    Patient: Yovani Blunt Age: 77 y.o. : 1955 MR#: [id number] SSN: [id number]  Date/Time: 2023    Subjective:     Pt w/o specific complaints. Assessment/Plan:   77year old male w/ h/o DVT on coumadin, prostate cancer, recent ED eval on  for chills and not feeling well, dc'd home after work up in the ED and called back due to positive blood cultures. -Group B Strep sepsis:  bottles.  ID following on abx  -Left lower ext cellulitis ID following on abx    HISTORY OF:  -DVT on coumadin: INR of 1.9  -HTN  -Prostate cancer  -Lumbar spine stenosis  -HLD  -H/a    PLAN:  -Abx per ID  -Coumadin per pharmacy  -Cont bp meds (lisinopril, HCTZ)  Additional Notes:      Case discussed with:  [x]Patient  []Family  []Nursing  []Case Management  DVT Prophylaxis:  []Lovenox  []Hep SQ  []SCDs  [x]Coumadin   []On Heparin gtt    Objective:   VS: /74   Pulse 68   Temp 97.6 °F (36.4 °C) (Oral)   Resp 18   Ht 5' 10\" (1.778 m)   Wt 232 lb (105.2 kg)   SpO2 97%   BMI 33.29 kg/m²    Tmax/24hrs: Temp (24hrs), Av.1 °F (36.7 °C), Min:97.6 °F (36.4 °C), Max:98.6 °F (37 °C)    Input/Output:   Intake/Output Summary (Last 24 hours) at 2023 2208  Last data filed at 2023 1749  Gross per 24 hour   Intake 1460 ml   Output 2575 ml   Net -1115 ml       Genera: alert, awake, in NAD  HEENT: NCAT, sclerae anicteric,  mmm, neck supple  COR: rrr, no murmurs  PULM: CTAB  ABD: soft, nt, nd  EXT: no edema, distal aspect of left lower extremity w/ faint erythema w/o clear boarders  NEURO: follows commands, no gross focal abnormalities, speech normal, no tremors  PSYCH: non agitated      Labs:    Recent Results (from the past 24 hour(s))   Basic Metabolic Panel w/ Reflex to MG    Collection Time: 23  1:24 AM   Result Value Ref Range    Sodium 140 136 - 145 mmol/L    Potassium 3.5 3.5 - 5.5 mmol/L    Chloride 109 100 - 111 mmol/L    CO2 27
Physician Progress Note      PATIENT:               Chris Berger  CSN #:                  [id number]  :                       1955  ADMIT DATE:       2023 9:32 AM  DISCH DATE:        2023 4:21 PM  RESPONDING  PROVIDER #:        Lupe Hyman MD          QUERY TEXT:    Pt admitted with bacteremia . Pt noted to have +BC drawn on , repeat BC on    =No growth. Progress note on   includes \"Group B Strep sepsis\" and   \"Left lower ext cellulitis\". In order to support the diagnosis of sepsis,   please include additional clinical indicators in your documentation. Or   please document if the diagnosis of sepsis has been ruled out after further   study    The medical record reflects the following:  Risk Factors: h/o DVT, HTN, prostate ca, +BC  Clinical Indicators: Per the ER note: Patient was called by provider due to   positive blood cultures. Patient patient states that he has been having   low-grade fevers around the 100s and has been taking Tylenol. Patient states that he has been having left lower extremity pain, redness and   swelling. ER impression: bacteremia, cellulitis of LLE  VS on  T 100.1 P 111 RR 27 /72  VS on  T 97.9 P 83 RR 18 /71  H&P: WBC 10.1  LA 1.4  Bacteremia: group B strep seen in 2/4 bottles from . Only apparent source   would be LLE cellulitis, however no open wounds are apparent  ID Progress note - Group B strep sepsis: From 2 out of 4 bottles. May or   may not be a contaminant. No change in vital signs, no documented fevers,   WBCs are 10. Lactic acid ranges between 1.4 and 1.6. Left lower extremity   pain and erythema-most consistent stent with erysipelas-  also within DDX is   phlebitis given underlying superficial thrombu  SIRS   ID note: : repeat cx remain neg  SIRS resolved  DC vancomycin and ceftriaxone.    Transition to doxycycline 1 tab p.o. twice   daily through   Treatment: ID consult, IV Vanco, IV Ceftriaxone,
[facility]  (A Division of [address] [place] [address])    Follow-up Note      Date of Admission: 5/24/2023       Date of Note:  5/26/2023          Current Antimicrobials:    Prior Antimicrobials:  Vancomycin 5/24 to present  Ceftriaxone 5/24 to present      Assessment:         Group B strep sepsis: From 2 out of 4 bottles. May or may not be a contaminant. No change in vital signs, no documented fevers, WBCs are 10. Lactic acid ranges between 1.4 and 1.6; repeat cultures all remain negative. 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 DC vancomycin and ceftriaxone. Transition to doxycycline 1 tab p.o. twice daily through 6/5    Discussed with Dr. Cagle Console for discharge from [address] [place] [address], [address] [place] [address] [facility]  [address] \[facility] [place] [facility]\"" [place] [address]  Office: [phone], Ext 8       Microbiology:  5/23 blood cultures:  2 out of 4 strep agalactiae  5/24 blood culture: ngtd x 2       Lines / Catheters:  Peripheral    Subjective:   Patient seen and examined. Feeling well. Overall improved pain to the left lower extremity although some residual tenderness at the medial aspect. No new fevers or chills. No other particular complaints at this time.       Objective:      /66   Pulse 70   Temp 97.7 °F (36.5 °C) (Oral)   Resp 18   Ht 5' 10\" (1.778 m)   Wt 235 lb 3.2 oz (106.7 kg)
Warfarin dosing - Pharmacy consult note    Indication: History of VTE/PE  Warfarin dose prior to admission: 10 mg daily  Significant drug interactions: none    Recent Labs     05/23/23  0510 05/24/23  0950 05/25/23  0124   INR 2.1* 2.0* 1.9*   HGB 13.2 12.2* 11.7*    137 135     Date INR Dose (mg)   5/23 2.1 -   5/24 2.0 10   5/25 1.9 10     Assessment/Plan  Goal INR: 2 - 3  Warfarin 10 mg today    INR order daily. Pharmacy will continue to follow.      [facility]
Warfarin dosing - Pharmacy consult note    Indication: History of VTE/PE  Warfarin dose prior to admission: 10 mg daily  Significant drug interactions: none    Recent Labs     05/24/23  0950 05/25/23  0124 05/26/23  0519   INR 2.0* 1.9* 1.8*   HGB 12.2* 11.7* 12.8*    135 153     Date INR Dose (mg)   5/23 2.1 -   5/24 2.0 10   5/25 1.9 10   5/26 1.8 10     Assessment/Plan  Goal INR: 2 - 3  Warfarin 10 mg today    INR order daily. Pharmacy will continue to follow.      Елена Mcmanus, [facility]
(36.7 °C) (Oral)   Resp 20   Ht 5' 10\" (1.778 m)   Wt 232 lb (105.2 kg)   SpO2 94%   BMI 33.29 kg/m²   Temp (24hrs), Av.1 °F (36.7 °C), Min:97.8 °F (36.6 °C), Max:98.6 °F (37 °C)        General:   awake alert and oriented, non-toxic   Skin:   dry and warm; erythema to left lower extremity-somewhat improved compared to prior day; scattered bruising; area of excoriation and irritation in the right antecubital fossa   Pink warm No scleral icterus or pallor; oral mucosa moist, lips moist   Lymph Nodes:   not assessed today   Lungs:   Non-labored; bilaterally clear to aspiration- no crackles wheezes rales or rhonchi   Heart:  RRR, s1 and s2; no murmurs rubs or gallops; no edema, + pedal pulses   Abdomen:  soft, non-distended, active bowel sounds, non-tender   Genitourinary:  deferred   Extremities:   average muscle tone; no contractures, no joint effusions   Neurologic:  No gross focal motor or sensory abnormalities; CN 2-12 intact; Follows commands. Psychiatric:   appropriate and interactive.          Lab results:  Chemistry  Recent Labs     23  0510 23  0950 23  0124    138 140   K 3.8 3.1* 3.5    106 109   CO2 29 26 27   BUN 16 15 12   GLOB 2.7 2.6  --        CBC w/ Diff  Recent Labs     23  0510 23  0950 23  0124   WBC 10.4 10.1 7.0   RBC 4.03* 3.73* 3.60*   HGB 13.2 12.2* 11.7*   HCT 38.7 36.1 35.2*    137 135       Microbiology  Results       Procedure Component Value Units Date/Time    Culture, Blood 2 [[id number]] Collected: 23    Order Status: Completed Specimen: Blood Updated: 2355     Special Requests --        NO SPECIAL REQUESTS  RIGHT  HAND       Culture NO GROWTH AFTER 12 HOURS       Culture, Blood 2 [[id number]]     Order Status: Canceled Specimen: Blood     Culture, Blood 1 [[id number]] Collected: 23 0950    Order Status: Completed Specimen: Blood Updated: 23 0655     Special Requests NO SPECIAL REQUESTS

## 2023-06-15 ENCOUNTER — HOSPITAL ENCOUNTER (OUTPATIENT)
Facility: HOSPITAL | Age: 68
Discharge: HOME | End: 2023-06-18
Payer: MEDICARE

## 2023-06-15 LAB
CRP SERPL-MCNC: 0.5 MG/DL (ref 0–0.3)
ERYTHROCYTE [DISTWIDTH] IN BLOOD BY AUTOMATED COUNT: 12.8 % (ref 11.6–14.5)
ERYTHROCYTE [SEDIMENTATION RATE] IN BLOOD: 17 MM/HR (ref 0–20)
HCT VFR BLD AUTO: 38.1 % (ref 36–48)
HGB BLD-MCNC: 12.6 G/DL (ref 13–16)
MCH RBC QN AUTO: 32.3 PG (ref 24–34)
MCHC RBC AUTO-ENTMCNC: 33.1 G/DL (ref 31–37)
MCV RBC AUTO: 97.7 FL (ref 78–100)
NRBC # BLD: 0 K/UL (ref 0–0.01)
NRBC BLD-RTO: 0 PER 100 WBC
PLATELET # BLD AUTO: 206 K/UL (ref 135–420)
PMV BLD AUTO: 11.3 FL (ref 9.2–11.8)
RBC # BLD AUTO: 3.9 M/UL (ref 4.35–5.65)
URATE SERPL-MCNC: 7.1 MG/DL (ref 2.6–7.2)
WBC # BLD AUTO: 5.2 K/UL (ref 4.6–13.2)

## 2023-06-15 PROCEDURE — 86140 C-REACTIVE PROTEIN: CPT

## 2023-06-15 PROCEDURE — 85027 COMPLETE CBC AUTOMATED: CPT

## 2023-06-15 PROCEDURE — 85652 RBC SED RATE AUTOMATED: CPT

## 2023-06-15 PROCEDURE — 36415 COLL VENOUS BLD VENIPUNCTURE: CPT

## 2023-06-15 PROCEDURE — 84550 ASSAY OF BLOOD/URIC ACID: CPT

## 2023-06-15 PROCEDURE — 83529 ASAY OF INTERLEUKIN-6 (IL-6): CPT

## 2023-06-19 LAB — IL6 SERPL-MCNC: <2.5 PG/ML (ref 0–13)

## 2023-06-24 ENCOUNTER — HOSPITAL ENCOUNTER (OUTPATIENT)
Facility: HOSPITAL | Age: 68
End: 2023-06-24
Attending: PHYSICAL MEDICINE & REHABILITATION
Payer: MEDICARE

## 2023-06-24 DIAGNOSIS — M62.838 OTHER MUSCLE SPASM: ICD-10-CM

## 2023-06-24 DIAGNOSIS — M48.061 SPINAL STENOSIS, LUMBAR REGION WITHOUT NEUROGENIC CLAUDICATION: ICD-10-CM

## 2023-06-24 DIAGNOSIS — M54.41 ACUTE MIDLINE LOW BACK PAIN WITH RIGHT-SIDED SCIATICA: ICD-10-CM

## 2023-06-24 PROCEDURE — A9577 INJ MULTIHANCE: HCPCS | Performed by: PHYSICAL MEDICINE & REHABILITATION

## 2023-06-24 PROCEDURE — 72158 MRI LUMBAR SPINE W/O & W/DYE: CPT

## 2023-06-24 PROCEDURE — 6360000004 HC RX CONTRAST MEDICATION: Performed by: PHYSICAL MEDICINE & REHABILITATION

## 2023-06-24 RX ADMIN — GADOBENATE DIMEGLUMINE 20 ML: 529 INJECTION, SOLUTION INTRAVENOUS at 12:24

## 2023-06-26 ENCOUNTER — TELEPHONE (OUTPATIENT)
Age: 68
End: 2023-06-26

## 2023-06-26 NOTE — TELEPHONE ENCOUNTER
Attempted to contact the pt on the number provided in the message. No one answered the phone. A chart review showed that the number left in the message was not correct. It is [phone] and not [phone]. I called this number and the pt's wife answered the phone. The pt is not currently home. She will pass the message on to the pt to call the office. I also called the mobile number listed and it was the pt's wife's mobile number.

## 2023-06-26 NOTE — TELEPHONE ENCOUNTER
I called and spoke to the pt. The pt was identified using 2 pt identifiers. He wants to know if he can go ahead and take the medrol dose pack that he has at home for his back and leg pain. Pt states he was in the office recently and the provider told him to wait at least a week because he was on antibiotics. Those are done now. Message will be sent to the provider for review. Pt will be contacted once provider sends response.

## 2023-06-26 NOTE — TELEPHONE ENCOUNTER
Patient stopped by [place] office. Was given a RX for Medrol Dose pack. Wants to know if he can start taking it. Please call back at [phone].

## 2023-06-26 NOTE — TELEPHONE ENCOUNTER
Patient returned called patient states that he is now home and will be waiting on a call       [phone]

## 2023-06-29 ENCOUNTER — TELEPHONE (OUTPATIENT)
Age: 68
End: 2023-06-29

## 2023-06-29 DIAGNOSIS — M54.41 ACUTE MIDLINE LOW BACK PAIN WITH RIGHT-SIDED SCIATICA: Primary | ICD-10-CM

## 2023-06-30 RX ORDER — PREDNISONE 10 MG/1
TABLET ORAL
Qty: 11 TABLET | Refills: 0 | Status: SHIPPED | OUTPATIENT
Start: 2023-06-30

## 2023-06-30 RX ORDER — METHYLPREDNISOLONE 4 MG/1
TABLET ORAL
Qty: 1 KIT | Refills: 0 | Status: SHIPPED | OUTPATIENT
Start: 2023-06-30 | End: 2023-06-30 | Stop reason: DRUGHIGH

## 2023-06-30 NOTE — TELEPHONE ENCOUNTER
I called and spoke to the pt. The pt was identified using 2 pt identifiers. He states that he has been taking the prednisone now for a couple of days because of his pain. He only has 2 more days left and states that he has not had any pain relief. Mr. Lesly Ricci says that in the past he has had to have the prednisone doses of 10 mg and is wondering if this would be an option for his pain. His MRI results are in [facility] for review. The pt's current follow up is not until August 7. Please review results and let me know if the pt needs to be seen sooner.

## 2023-07-07 ENCOUNTER — OFFICE VISIT (OUTPATIENT)
Age: 68
End: 2023-07-07

## 2023-07-07 VITALS — WEIGHT: 232 LBS | HEIGHT: 70 IN | BODY MASS INDEX: 33.21 KG/M2

## 2023-07-07 DIAGNOSIS — M17.11 UNILATERAL PRIMARY OSTEOARTHRITIS, RIGHT KNEE: Primary | ICD-10-CM

## 2023-07-07 PROCEDURE — 99024 POSTOP FOLLOW-UP VISIT: CPT | Performed by: PHYSICIAN ASSISTANT

## 2023-07-07 RX ORDER — HYALURONATE SODIUM 10 MG/ML
20 SYRINGE (ML) INTRAARTICULAR ONCE
Status: COMPLETED | OUTPATIENT
Start: 2023-07-07 | End: 2023-07-07

## 2023-07-07 RX ADMIN — Medication 20 MG: at 14:40

## 2023-07-12 ENCOUNTER — TELEPHONE (OUTPATIENT)
Age: 68
End: 2023-07-12

## 2023-07-12 DIAGNOSIS — M54.41 ACUTE MIDLINE LOW BACK PAIN WITH RIGHT-SIDED SCIATICA: Primary | ICD-10-CM

## 2023-07-12 NOTE — TELEPHONE ENCOUNTER
Patient's wife, Ness Street, called requesting a [facility] [address].      Please advise Ness Yenifer at [phone]

## 2023-07-13 ENCOUNTER — OFFICE VISIT (OUTPATIENT)
Age: 68
End: 2023-07-13
Payer: MEDICARE

## 2023-07-13 VITALS — BODY MASS INDEX: 33.21 KG/M2 | HEIGHT: 70 IN | WEIGHT: 232 LBS

## 2023-07-13 DIAGNOSIS — M17.11 UNILATERAL PRIMARY OSTEOARTHRITIS, RIGHT KNEE: Primary | ICD-10-CM

## 2023-07-13 PROCEDURE — 99024 POSTOP FOLLOW-UP VISIT: CPT | Performed by: PHYSICIAN ASSISTANT

## 2023-07-13 PROCEDURE — 20611 DRAIN/INJ JOINT/BURSA W/US: CPT | Performed by: PHYSICIAN ASSISTANT

## 2023-07-13 RX ORDER — HYALURONATE SODIUM 10 MG/ML
20 SYRINGE (ML) INTRAARTICULAR ONCE
Status: COMPLETED | OUTPATIENT
Start: 2023-07-13 | End: 2023-07-13

## 2023-07-13 RX ADMIN — Medication 20 MG: at 14:49

## 2023-07-14 RX ORDER — OXYCODONE HYDROCHLORIDE AND ACETAMINOPHEN 5; 325 MG/1; MG/1
1 TABLET ORAL EVERY 6 HOURS PRN
Qty: 28 TABLET | Refills: 0 | Status: SHIPPED | OUTPATIENT
Start: 2023-07-14 | End: 2023-07-21

## 2023-07-17 ENCOUNTER — OFFICE VISIT (OUTPATIENT)
Age: 68
End: 2023-07-17
Payer: MEDICARE

## 2023-07-17 VITALS
DIASTOLIC BLOOD PRESSURE: 67 MMHG | BODY MASS INDEX: 33.5 KG/M2 | OXYGEN SATURATION: 97 % | SYSTOLIC BLOOD PRESSURE: 125 MMHG | TEMPERATURE: 97.2 F | HEIGHT: 70 IN | WEIGHT: 234 LBS | HEART RATE: 80 BPM

## 2023-07-17 DIAGNOSIS — M62.838 OTHER MUSCLE SPASM: ICD-10-CM

## 2023-07-17 DIAGNOSIS — M48.062 SPINAL STENOSIS OF LUMBAR REGION WITH NEUROGENIC CLAUDICATION: ICD-10-CM

## 2023-07-17 DIAGNOSIS — M47.816 LUMBAR FACET ARTHROPATHY: ICD-10-CM

## 2023-07-17 DIAGNOSIS — R26.9 GAIT ABNORMALITY: ICD-10-CM

## 2023-07-17 DIAGNOSIS — M54.16 LUMBAR RADICULITIS: Primary | ICD-10-CM

## 2023-07-17 DIAGNOSIS — Z79.01 LONG TERM (CURRENT) USE OF ANTICOAGULANTS: ICD-10-CM

## 2023-07-17 PROCEDURE — G8427 DOCREV CUR MEDS BY ELIG CLIN: HCPCS | Performed by: PHYSICAL MEDICINE & REHABILITATION

## 2023-07-17 PROCEDURE — 99214 OFFICE O/P EST MOD 30 MIN: CPT | Performed by: PHYSICAL MEDICINE & REHABILITATION

## 2023-07-17 PROCEDURE — 1123F ACP DISCUSS/DSCN MKR DOCD: CPT | Performed by: PHYSICAL MEDICINE & REHABILITATION

## 2023-07-17 PROCEDURE — 3078F DIAST BP <80 MM HG: CPT | Performed by: PHYSICAL MEDICINE & REHABILITATION

## 2023-07-17 PROCEDURE — 3074F SYST BP LT 130 MM HG: CPT | Performed by: PHYSICAL MEDICINE & REHABILITATION

## 2023-07-17 PROCEDURE — 1036F TOBACCO NON-USER: CPT | Performed by: PHYSICAL MEDICINE & REHABILITATION

## 2023-07-17 PROCEDURE — G8417 CALC BMI ABV UP PARAM F/U: HCPCS | Performed by: PHYSICAL MEDICINE & REHABILITATION

## 2023-07-17 PROCEDURE — 3017F COLORECTAL CA SCREEN DOC REV: CPT | Performed by: PHYSICAL MEDICINE & REHABILITATION

## 2023-07-17 RX ORDER — ZINC GLUCONATE 50 MG
50 TABLET ORAL DAILY
COMMUNITY

## 2023-07-17 RX ORDER — GABAPENTIN 300 MG/1
CAPSULE ORAL
Qty: 60 CAPSULE | Refills: 2 | Status: SHIPPED | OUTPATIENT
Start: 2023-07-17 | End: 2023-08-30

## 2023-07-17 NOTE — PROGRESS NOTES
Rosaria Curling presents today for   Chief Complaint   Patient presents with    Back Pain    Leg Pain     Bilateral        Is someone accompanying this pt? Yes, spouse     Is the patient using any DME equipment during [address]? Yes, cane     Depression Screening:  PHQ-9 Questionaire 3/9/2023 8/26/2022 7/5/2022 4/20/2022 9/10/2021 9/8/2021 9/3/2021   Little interest or pleasure in doing things 0 0 0 0 0 0 0   Feeling down, depressed, or hopeless 0 0 0 0 0 0 0   PHQ-9 Total Score 0 0 0 0 0 0 0     PHQ Scores 3/9/2023 8/26/2022 7/5/2022 4/20/2022 1/11/2022 1/5/2022 12/1/2021   PHQ2 Score 0 0 0 0 - - -   PHQ2 Score - - - 0 0 0 0   PHQ9 Score 0 0 0 0 - - -       Abuse Screening: AMB Abuse Screening 3/9/2023   Do you ever feel afraid of your partner? N   Are you in a relationship with someone who physically or mentally threatens you? N   Is it safe for you to go home? Y       Coordination of Care:  1. Have you been to the ER, urgent care clinic since your last visit? no  Hospitalized since your last visit? no    2. Have you seen or consulted any other health care providers outside of the [address] [place] [address] since your last visit? Yes, podiatry, ortho Include any pap smears or colon screening.  no
fibers are retracted past the bicipital groove, at least 8 cm from the biceps anchor insertion. 5. Moderate joint effusion with synovitis. 6. Moderate distension of the subacromial subdeltoid bursa. Written by Mariaa Faria, as dictated by Charissa Aparicio MD.  I, Dr. Charissa Aparicio confirm that all documentation is accurate.

## 2023-07-19 ENCOUNTER — TELEPHONE (OUTPATIENT)
Age: 68
End: 2023-07-19

## 2023-07-19 NOTE — TELEPHONE ENCOUNTER
Patient's wife called on 7/19/2023 at 3:16pm and stated that her  Fara Nicolas saw NP German Nyhan today 7/19/2023 and he does not have cellulitis. He can proceed to injection under patient Workmen's Comp.

## 2023-07-20 ENCOUNTER — OFFICE VISIT (OUTPATIENT)
Age: 68
End: 2023-07-20

## 2023-07-20 DIAGNOSIS — M17.11 UNILATERAL PRIMARY OSTEOARTHRITIS, RIGHT KNEE: Primary | ICD-10-CM

## 2023-07-20 RX ORDER — HYALURONATE SODIUM 10 MG/ML
20 SYRINGE (ML) INTRAARTICULAR ONCE
Status: COMPLETED | OUTPATIENT
Start: 2023-07-20 | End: 2023-07-20

## 2023-07-20 RX ADMIN — Medication 20 MG: at 14:14

## 2023-07-20 NOTE — PROGRESS NOTES
Patient: General Black                MRN: [id number]       SSN: [id number]  YOB: 1955        AGE: 77 y.o. SEX: male  There is no height or weight on file to calculate BMI. PCP: KARMA Brian NP  07/20/23        Pt presents today for their 3rd euflexxa  injection for right knee . There has been no recent fevers/chills, systemic changes, or injuries to report. PE:  General A&Ox3, NAD  Exam of the knees reveals skin intact, no erythema, no ecchymosis, no signs for infection. No cyanosis, clubbing, or edema present distally. Neg calf tenderness, neg homans, no signs for DVT present. A: right knee OA    P: The right knee was injected with 2ml euflexxa with US guided assistance without complications. Patient was instructed on post injection care. Chart reviewed for the following:  Leena DAVILA PA-C, have reviewed the History, Physical and updated the Allergic reactions for General Sofia? TIME OUT performed immediately prior to start of procedure:  Leena DAVILA PA-C, have performed the following reviews on General Sofia prior to the start of the procedure:  ????????  * Patient was identified by name and date of birth   * Agreement on procedure being performed was verified  * Risks and Benefits explained to the patient  * Procedure site verified and marked as necessary  * Patient was positioned for comfort  * Consent was signed and verified    Time: 2:13 PM    Body part: right knee, intra-articular    Medication & Dose: 2ml euflexxa    Date of procedure: 07/20/23    Procedure performed by: Ofe Howard PA-C    Patient assisted by: self    How tolerated by patient: tolerated the procedure well with no complications    Post Procedural Pain Scale: 4    Comments:  [address] using a frequency of 10MHz with a 12L-RS transducer head was used to confirm needle placement.   Ultrasound images captured using [address] Ultrasound machine and

## 2023-07-31 ENCOUNTER — TELEPHONE (OUTPATIENT)
Age: 68
End: 2023-07-31

## 2023-07-31 NOTE — TELEPHONE ENCOUNTER
Patient's wife, Wanda Barrios, called regarding instructions for patient's upcoming block. Clarification is needed for when to stop medication.      Wanda Barrios can be reached at: [phone] or [phone]

## 2023-08-03 ENCOUNTER — HOSPITAL ENCOUNTER (OUTPATIENT)
Facility: HOSPITAL | Age: 68
Discharge: HOME | End: 2023-08-03
Payer: MEDICARE

## 2023-08-03 ENCOUNTER — HOSPITAL ENCOUNTER (OUTPATIENT)
Facility: HOSPITAL | Age: 68
End: 2023-08-03
Payer: MEDICARE

## 2023-08-03 VITALS
SYSTOLIC BLOOD PRESSURE: 130 MMHG | HEART RATE: 67 BPM | OXYGEN SATURATION: 94 % | RESPIRATION RATE: 16 BRPM | TEMPERATURE: 97.3 F | DIASTOLIC BLOOD PRESSURE: 77 MMHG

## 2023-08-03 PROBLEM — M54.16 LUMBAR RADICULOPATHY: Status: ACTIVE | Noted: 2023-08-03

## 2023-08-03 PROCEDURE — 64484 NJX AA&/STRD TFRM EPI L/S EA: CPT | Performed by: PHYSICAL MEDICINE & REHABILITATION

## 2023-08-03 PROCEDURE — 6360000004 HC RX CONTRAST MEDICATION: Performed by: PHYSICAL MEDICINE & REHABILITATION

## 2023-08-03 PROCEDURE — 64484 NJX AA&/STRD TFRM EPI L/S EA: CPT

## 2023-08-03 PROCEDURE — 6370000000 HC RX 637 (ALT 250 FOR IP): Performed by: PHYSICAL MEDICINE & REHABILITATION

## 2023-08-03 PROCEDURE — 64483 NJX AA&/STRD TFRM EPI L/S 1: CPT

## 2023-08-03 PROCEDURE — 2500000003 HC RX 250 WO HCPCS: Performed by: PHYSICAL MEDICINE & REHABILITATION

## 2023-08-03 PROCEDURE — 6360000002 HC RX W HCPCS: Performed by: PHYSICAL MEDICINE & REHABILITATION

## 2023-08-03 PROCEDURE — 64483 NJX AA&/STRD TFRM EPI L/S 1: CPT | Performed by: PHYSICAL MEDICINE & REHABILITATION

## 2023-08-03 RX ORDER — DEXAMETHASONE SODIUM PHOSPHATE 10 MG/ML
10 INJECTION, SOLUTION INTRAMUSCULAR; INTRAVENOUS ONCE
Status: COMPLETED | OUTPATIENT
Start: 2023-08-03 | End: 2023-08-03

## 2023-08-03 RX ORDER — DIAZEPAM 5 MG/1
10 TABLET ORAL ONCE
Status: COMPLETED | OUTPATIENT
Start: 2023-08-03 | End: 2023-08-03

## 2023-08-03 RX ORDER — DIAZEPAM 5 MG/1
5 TABLET ORAL ONCE
Status: COMPLETED | OUTPATIENT
Start: 2023-08-03 | End: 2023-08-03

## 2023-08-03 RX ORDER — DIAZEPAM 5 MG/1
2.5 TABLET ORAL ONCE
Status: COMPLETED | OUTPATIENT
Start: 2023-08-03 | End: 2023-08-03

## 2023-08-03 RX ORDER — LIDOCAINE HYDROCHLORIDE 10 MG/ML
30 INJECTION, SOLUTION EPIDURAL; INFILTRATION; INTRACAUDAL; PERINEURAL ONCE
Status: COMPLETED | OUTPATIENT
Start: 2023-08-03 | End: 2023-08-03

## 2023-08-03 RX ADMIN — DEXAMETHASONE SODIUM PHOSPHATE 10 MG: 10 INJECTION, SOLUTION INTRAMUSCULAR; INTRAVENOUS at 13:39

## 2023-08-03 RX ADMIN — DIAZEPAM 2.5 MG: 5 TABLET ORAL at 12:28

## 2023-08-03 RX ADMIN — LIDOCAINE HYDROCHLORIDE 12 ML: 10 INJECTION, SOLUTION EPIDURAL; INFILTRATION; INTRACAUDAL; PERINEURAL at 13:37

## 2023-08-03 RX ADMIN — IOPAMIDOL 1 ML: 408 INJECTION, SOLUTION INTRATHECAL at 13:38

## 2023-08-03 ASSESSMENT — PAIN - FUNCTIONAL ASSESSMENT: PAIN_FUNCTIONAL_ASSESSMENT: 0-10

## 2023-08-03 ASSESSMENT — PAIN SCALES - GENERAL: PAINLEVEL_OUTOF10: 7

## 2023-08-03 NOTE — OP NOTE
PROCEDURE NOTE      Patient Name: Mai Harp    Date of Procedure: August 3, 2023    Preoperative Diagnosis:  M54.16    Post Operative Diagnosis:  M54.16    Procedure :    right L3 Selective Nerve Root Block  right L4 Selective Nerve Root Block      Consent:  Informed consent was obtained prior to the procedure. The patient was given the opportunity to ask questions regarding the procedure and its associated risks. In addition to the potential risks associated with the procedure itself, the patient was informed both verbally and in writing of the potential side effects of the use of glucocorticoid. The patient appeared to comprehend the informed consent and desired to have the procedure performed. Procedure: The patient was placed in the prone position on the fluoroscopy table and the back was prepped and draped in the usual sterile manner. The exact spinal level was  identified using fluoroscopy, and Lidocaine 1 % was injected locally, a # 22 gauge spinal needle was passed to the transverse process. The depth was noted and the needle redirected to pass inferior and approximately one cm anterior to the transverse process. Yes  1 cc of Isovue M-200 was used to verify positioning in the epidural and paravertebral space and outlined the course of the spinal nerve into the epidural space. The same procedure was repeated at each spinal level indicated above. A total of 10 mg of preservative free Dexamethasone and 2 cc of Lidocaine was slowly injected. The patient tolerated the procedure well. The injection area was cleaned and bandaids applied. Not excessive bleeding was noted. Patient dressed and discharged to home with instructions. Discussion: The patient tolerated the procedure well.                                               Opal Lopez MD  August 3, 2023

## 2023-08-08 ENCOUNTER — TELEPHONE (OUTPATIENT)
Age: 68
End: 2023-08-08

## 2023-08-08 NOTE — TELEPHONE ENCOUNTER
patient still having pain after injection  Received: Today   Call patient  Ismael Nathan MD; [facility], N  Good Morning,   This patient call this morning and stated he is still in pain after having an injection last week with Dr. Taylor Evans. He can be reached at [phone] cell #.    Thank you,   Didi Hanson

## 2023-08-08 NOTE — TELEPHONE ENCOUNTER
Patient contacted and he states he is having right sided back pain , hip pain, the worst pain is in the right thigh and down into ankle and he cant put weight on it. When he crosses his legs at the ankles it does relieve it some. Once he starts walking it eases up some. He is taking extra strength tylenol TID and his fioricet.

## 2023-08-09 NOTE — TELEPHONE ENCOUNTER
Patient wife return called and would like to know how to move forward sine patient missed appointment        [phone]

## 2023-08-09 NOTE — TELEPHONE ENCOUNTER
M for the patient to offer a 2:40 pm appointment today at [address] [place] [address] with Dr. Michelle Carmen.

## 2023-08-10 ENCOUNTER — OFFICE VISIT (OUTPATIENT)
Age: 68
End: 2023-08-10

## 2023-08-10 VITALS
HEIGHT: 70 IN | OXYGEN SATURATION: 96 % | BODY MASS INDEX: 33.5 KG/M2 | TEMPERATURE: 97.6 F | HEART RATE: 80 BPM | WEIGHT: 234 LBS

## 2023-08-10 DIAGNOSIS — R26.9 GAIT ABNORMALITY: ICD-10-CM

## 2023-08-10 DIAGNOSIS — Z79.01 LONG TERM (CURRENT) USE OF ANTICOAGULANTS: ICD-10-CM

## 2023-08-10 DIAGNOSIS — M47.816 LUMBAR FACET ARTHROPATHY: ICD-10-CM

## 2023-08-10 DIAGNOSIS — M54.16 LUMBAR RADICULITIS: ICD-10-CM

## 2023-08-10 DIAGNOSIS — M53.3 SACROILIAC JOINT PAIN: Primary | ICD-10-CM

## 2023-08-10 DIAGNOSIS — M48.062 SPINAL STENOSIS OF LUMBAR REGION WITH NEUROGENIC CLAUDICATION: ICD-10-CM

## 2023-08-10 RX ORDER — AMLODIPINE BESYLATE 2.5 MG/1
TABLET ORAL
COMMUNITY
Start: 2023-07-26

## 2023-08-10 RX ORDER — METAXALONE 800 MG/1
TABLET ORAL
Qty: 90 TABLET | OUTPATIENT
Start: 2023-08-10

## 2023-08-10 RX ORDER — GABAPENTIN 300 MG/1
CAPSULE ORAL
Qty: 120 CAPSULE | Refills: 2 | Status: SHIPPED | OUTPATIENT
Start: 2023-08-10 | End: 2023-11-01

## 2023-08-10 NOTE — PROGRESS NOTES
L3/4, L4/5 L5/S1. Bridging osteophytes L3/4. No instability. Degenerative joint findings in both hips. Lumbar spine MRI from 01/24/2018 was personally reviewed with the patient and demonstrated:  FINDINGS:      Prior laminotomies on the left at L4-5 and bilaterally at L5-S1. No fracture,  bone destruction, or fluid collection. Intact lordosis. Normal vertebral body heights. Small less than 5 mm low signal  focus within anterior L4, developed  since 2013. No similar focus elsewhere. No  aggressive features. Otherwise generally fatty marrow signal with some chronic  fatty endplate changes straddling L5-S1. Moderate to severe disc space narrowing  and disc desiccation of that level. Mild to moderate narrowing and desiccation  of L3-4 and L4-5. Conus at T12-L1. Axial imaging correlation:    T12-L1:  Patent canal and foramina. L1-L2: Patent canal and foramina. L2-L3: Patent canal and foramina. L3-L4: Broad-based disc osteophyte complex. Bilateral facet arthropathy with  ligamentum flavum thickening and buckling. Moderate concentric spinal stenosis. Particular narrowing of lateral recesses with transient distortion of the  crossing  L4 nerves. Axial T2 image 20. Patent foramina. Progression of  degenerative findings. L4-L5: Postoperative level. Mild broad-based disc osteophyte complex with a  small amount of enhancing scar tissue. Hypertrophic facet arthropathy. Moderate  spinal stenosis. Narrowing of the lateral recesses left more than right. Transient distortion of the crossing nerves particularly left L5. Axial T2 image  13. Mild foraminal stenoses. Unchanged. L5-S1: Postoperative  level. Broad-based disc osteophyte complex with enhancing  scar tissue centrally. Hypertrophic facet arthropathy. No significant spinal  stenosis. Moderately severe bilateral foraminal stenoses. Unchanged. Incidental imaging of regional  soft tissues unremarkable. IMPRESSION:  1.  Some

## 2023-08-10 NOTE — TELEPHONE ENCOUNTER
Requested Prescriptions     Pending Prescriptions Disp Refills    metaxalone (SKELAXIN) 800 MG tablet [Pharmacy Med Name: METAXALONE 800MG TABLETS] 90 tablet      Sig: TAKE 1 TABLET BY MOUTH THREE TIMES DAILY AS NEEDED FOR MUSCLE SPASM      Patient was last seen on 7/17/23. Patient's next appointment 9/13/23. Patient's choice of pharmacy [place], [place]. The refill request's last refill info on 12/21/22 Qty 90 2/3 refills. Patient completed block injection based off last office note.

## 2023-08-10 NOTE — H&P (VIEW-ONLY)
[facility]  [address] [place] [address], [address] Saurabh Crowe[address]  Phone: [phone]  Fax: [phone]    Pt's YOB: 1955    ASSESSMENT   Sylvia Lowe was seen today for back problem. Diagnoses and all orders for this visit:    Sacroiliac joint pain  -     Ambulatory Referral to Ortho Injection    Lumbar radiculitis  -     gabapentin (NEURONTIN) 300 MG capsule; Take 2  po bid as directed for neuropathic symptoms    Lumbar facet arthropathy    Spinal stenosis of lumbar region with neurogenic claudication    Gait abnormality    Long term (current) use of anticoagulants         IMPRESSION AND PLAN:  Faith Benoit is a 77 y.o. male with history of cervical and lumbar pain and presents to the office today for medication follow up. Pt continues to complain of lumbar pain with radicular symptoms into the posterior aspect of the right extremity, knee pain, pain in the bilateral buttock regions and sciatic nerve pain with improvement in the intermittent numbness in the left lower extremity secondary to undergoing a right L3, right L4 SNRB. Pt reports minimal improvement with the right L3, right L4 SNRB administered by Dr. Florencio Campos on 08/03/2023. per his last note,  he underwent lumbar spine surgery with Dr. Flor Yarbrough a few years ago, he is followed by Dr. Trenton Hilton and previously underwent a cervical injection without improvement and continues to be followed by [facility] where he receives steroid injections in his knees. He is taking Skelaxin 800 mg 2 caps QHS as directed, Neurontin 300 mg 1 po BID and will increase to 2 po BID for better management of neuropathic symptoms, Percocet, uses moist heat with relief and supplements with vitamin D and zinc with relief. 1) Pt was given information on sacroiliac exercises. 2) Pt was referred to orthopedics for a right SI injection for better pain management.    3) He is taking Neurontin 300 mg 1 po BID and will

## 2023-08-15 NOTE — TELEPHONE ENCOUNTER
He can call his insurance to see what is going on with the skelaxin. It could need a PA or it could be their formulary changed. Please have Dr. Amilcar Celeste print out the exercises she wants him to do.

## 2023-08-15 NOTE — TELEPHONE ENCOUNTER
Patient called about a prescription he stated that was \"kicked back\" by the pharmacy. He stated he has been on this medication for years and this is the first time he's had an issue. He is also asking about exercises that the provider was going to provide. He stated it was not in his paperwork at checkout.      Metaxalone 800 mg    [facility] [address] [id number]   Phone:  [phone]  Fax:  [phone]

## 2023-08-16 RX ORDER — METAXALONE 800 MG/1
800 TABLET ORAL 3 TIMES DAILY
Qty: 90 TABLET | Refills: 3 | Status: SHIPPED | OUTPATIENT
Start: 2023-08-16

## 2023-08-16 NOTE — TELEPHONE ENCOUNTER
Spoke to pt using two identifiers. Informed Mr. Peña Rula of message from NP and he stated he would contact his insurance to see what is going on. He would also like to  the exercises from our  tomorrow at our [place] office. I will let  know so the exercises can be prepared for the patient.

## 2023-08-16 NOTE — TELEPHONE ENCOUNTER
AVS printed for patient. He is aware that he can pick it up tomorrow 8-17-23. He would like a refill on metaxalone 800mg.  Last RX 12-21-22 #90 with 3 RF

## 2023-08-22 ENCOUNTER — HOSPITAL ENCOUNTER (OUTPATIENT)
Facility: HOSPITAL | Age: 68
Discharge: HOME | End: 2023-08-25
Payer: MEDICARE

## 2023-08-22 VITALS
RESPIRATION RATE: 16 BRPM | TEMPERATURE: 97.6 F | OXYGEN SATURATION: 96 % | HEART RATE: 96 BPM | DIASTOLIC BLOOD PRESSURE: 81 MMHG | SYSTOLIC BLOOD PRESSURE: 140 MMHG

## 2023-08-22 PROCEDURE — 6370000000 HC RX 637 (ALT 250 FOR IP): Performed by: PHYSICAL MEDICINE & REHABILITATION

## 2023-08-22 PROCEDURE — 27096 INJECT SACROILIAC JOINT: CPT

## 2023-08-22 PROCEDURE — 2500000003 HC RX 250 WO HCPCS: Performed by: PHYSICAL MEDICINE & REHABILITATION

## 2023-08-22 PROCEDURE — 6360000004 HC RX CONTRAST MEDICATION: Performed by: PHYSICAL MEDICINE & REHABILITATION

## 2023-08-22 PROCEDURE — 27096 INJECT SACROILIAC JOINT: CPT | Performed by: PHYSICAL MEDICINE & REHABILITATION

## 2023-08-22 PROCEDURE — 6360000002 HC RX W HCPCS: Performed by: PHYSICAL MEDICINE & REHABILITATION

## 2023-08-22 RX ORDER — DIAZEPAM 5 MG/1
10 TABLET ORAL ONCE
Status: COMPLETED | OUTPATIENT
Start: 2023-08-22 | End: 2023-08-22

## 2023-08-22 RX ORDER — DEXAMETHASONE SODIUM PHOSPHATE 10 MG/ML
10 INJECTION, SOLUTION INTRAMUSCULAR; INTRAVENOUS ONCE
Status: COMPLETED | OUTPATIENT
Start: 2023-08-22 | End: 2023-08-22

## 2023-08-22 RX ORDER — LIDOCAINE HYDROCHLORIDE 10 MG/ML
30 INJECTION, SOLUTION EPIDURAL; INFILTRATION; INTRACAUDAL; PERINEURAL ONCE
Status: COMPLETED | OUTPATIENT
Start: 2023-08-22 | End: 2023-08-22

## 2023-08-22 RX ORDER — DIAZEPAM 5 MG/1
2.5 TABLET ORAL ONCE
Status: COMPLETED | OUTPATIENT
Start: 2023-08-22 | End: 2023-08-22

## 2023-08-22 RX ORDER — DIAZEPAM 5 MG/1
5 TABLET ORAL ONCE
Status: COMPLETED | OUTPATIENT
Start: 2023-08-22 | End: 2023-08-22

## 2023-08-22 RX ADMIN — DEXAMETHASONE SODIUM PHOSPHATE 10 MG: 10 INJECTION, SOLUTION INTRAMUSCULAR; INTRAVENOUS at 09:09

## 2023-08-22 RX ADMIN — IOPAMIDOL 1 ML: 408 INJECTION, SOLUTION INTRATHECAL at 09:08

## 2023-08-22 RX ADMIN — DIAZEPAM 5 MG: 5 TABLET ORAL at 08:45

## 2023-08-22 RX ADMIN — LIDOCAINE HYDROCHLORIDE 15 ML: 10 INJECTION, SOLUTION EPIDURAL; INFILTRATION; INTRACAUDAL; PERINEURAL at 09:07

## 2023-08-22 ASSESSMENT — PAIN - FUNCTIONAL ASSESSMENT: PAIN_FUNCTIONAL_ASSESSMENT: 0-10

## 2023-08-22 ASSESSMENT — PAIN SCALES - GENERAL: PAINLEVEL_OUTOF10: 4

## 2023-08-22 NOTE — PROCEDURES
Unilateral Sacroiliac Joint Injection Procedure Note    Patient Name: Marcio Miranda    Date of Procedure: August 22, 2023    Preoperative Diagnosis:Sacroiliac Dysfunction    Post Operative Diagnosis: same    Procedure: SI Joint Injection, Intra-articular and extra-articular - Unilateral  right    Consent: Informed consent was obtained prior to the procedure. The patient was given the opportunity to ask questions regarding the procedure and its associated risks. In addition to the potential risks associated with the procedure itself, the patient was informed both verbally and in writing of potential side effects of the use of corticosteroids. The patient appeared to comprehend the informed consent and desired to have the procedure performed. Procedure: The patient was placed in the prone position on the flouroscopy table and the back was prepped and draped in the usual sterile manner. After local Lidocaine 1% infiltration, a #22 gauge spinal needle was then advanced to lie within the Sacroiliac Joint. Yes about 1cc of Isovue was used to confirm placement, no vascular uptake was identified. A total of 10 mg of Dexamethasone  and 5 ml of Lidocaine was introduced in and around the joint. The injection area was cleaned and bandaids applied. No excessive bleeding was noted. Patient dressed and was discharged to home with instructions. Discussion:  The patient tolerated the procedure well. Patient reported yoly-procedural pain on Visual Analog Scale:  pre-5; post-4.         Radha La MD  August 22, 2023

## 2023-08-22 NOTE — INTERVAL H&P NOTE
This patient was referred for audiometric testing by Dr. Chris Doyle due to repeated ear infections and history of PE tubes. Patient has been diagnosed with Down Syndrome. Soundfield audiometry was attempted. Could not condition to pure tones. Speech awareness response noted at 50 dB, which is slightly elevated for patient's age. Distortion Product Otoacoustic Emission (DPOAE) testing was attempted bilaterally, but could not be completed due to patient movement. The results were reviewed with the patient's parent. Recommendations for follow up will be made pending physician consult. SHELBY Saunders.   Audiology Intern (4th Year)      eNry Sinclair   Electronically signed by Nery Sinclair on 2/23/2023 at 12:09 PM Update History & Physical    The patient's History and Physical of August 10, 2023 was reviewed. There was no change. The surgical site was confirmed by the patient and me. Plan: The risks, benefits, expected outcome, and alternative to the recommended procedure have been discussed with the patient. Patient understands and wants to proceed with the procedure.      Electronically signed by Estrada Barton MD on 8/22/2023 at 8:51 AM

## 2023-08-31 ENCOUNTER — TELEPHONE (OUTPATIENT)
Age: 68
End: 2023-08-31

## 2023-08-31 DIAGNOSIS — M48.062 SPINAL STENOSIS OF LUMBAR REGION WITH NEUROGENIC CLAUDICATION: ICD-10-CM

## 2023-08-31 DIAGNOSIS — M47.816 LUMBAR FACET ARTHROPATHY: ICD-10-CM

## 2023-08-31 DIAGNOSIS — M53.3 SACROILIAC JOINT PAIN: Primary | ICD-10-CM

## 2023-08-31 DIAGNOSIS — M54.16 LUMBAR RADICULITIS: ICD-10-CM

## 2023-08-31 NOTE — TELEPHONE ENCOUNTER
Patient states that he had the injection with Dr. Dequan Murcia and it seemed to only work on the first day . He's asking how to move forward or the next steps. Patient can be reached at [phone].

## 2023-09-01 NOTE — TELEPHONE ENCOUNTER
Patient states its in his right leg and both hips. He has not seen Dr Jessica Vargas since last November.

## 2023-09-05 NOTE — TELEPHONE ENCOUNTER
Patients wife, Carol Baires, called with patient in background to check status of message below - they're anxious to hear back from us as soon as possible, [phone].

## 2023-09-06 NOTE — TELEPHONE ENCOUNTER
I spoke with patient and gave him the information below. An order has been entered for Dr Della Rick.

## 2023-09-07 ENCOUNTER — OFFICE VISIT (OUTPATIENT)
Age: 68
End: 2023-09-07
Payer: MEDICARE

## 2023-09-07 VITALS — BODY MASS INDEX: 33.5 KG/M2 | WEIGHT: 234 LBS | HEIGHT: 70 IN

## 2023-09-07 DIAGNOSIS — M17.11 PRIMARY OSTEOARTHRITIS OF RIGHT KNEE: Primary | ICD-10-CM

## 2023-09-07 PROCEDURE — 1123F ACP DISCUSS/DSCN MKR DOCD: CPT | Performed by: PHYSICIAN ASSISTANT

## 2023-09-07 PROCEDURE — G8427 DOCREV CUR MEDS BY ELIG CLIN: HCPCS | Performed by: PHYSICIAN ASSISTANT

## 2023-09-07 PROCEDURE — 20611 DRAIN/INJ JOINT/BURSA W/US: CPT | Performed by: PHYSICIAN ASSISTANT

## 2023-09-07 PROCEDURE — G8417 CALC BMI ABV UP PARAM F/U: HCPCS | Performed by: PHYSICIAN ASSISTANT

## 2023-09-07 PROCEDURE — 1036F TOBACCO NON-USER: CPT | Performed by: PHYSICIAN ASSISTANT

## 2023-09-07 PROCEDURE — 99214 OFFICE O/P EST MOD 30 MIN: CPT | Performed by: PHYSICIAN ASSISTANT

## 2023-09-07 PROCEDURE — 3017F COLORECTAL CA SCREEN DOC REV: CPT | Performed by: PHYSICIAN ASSISTANT

## 2023-09-07 RX ORDER — BETAMETHASONE SODIUM PHOSPHATE AND BETAMETHASONE ACETATE 3; 3 MG/ML; MG/ML
6 INJECTION, SUSPENSION INTRA-ARTICULAR; INTRALESIONAL; INTRAMUSCULAR; SOFT TISSUE ONCE
Status: COMPLETED | OUTPATIENT
Start: 2023-09-07 | End: 2023-09-07

## 2023-09-07 RX ADMIN — BETAMETHASONE SODIUM PHOSPHATE AND BETAMETHASONE ACETATE 6 MG: 3; 3 INJECTION, SUSPENSION INTRA-ARTICULAR; INTRALESIONAL; INTRAMUSCULAR; SOFT TISSUE at 14:14

## 2023-09-07 NOTE — PROGRESS NOTES
09/07/23    Procedure performed by: Anton Figueroa PA-C    Provider assisted by: none    Patient assisted by: self    How tolerated by patient: tolerated the procedure well with no complications    Post Procedural Pain Scale: 6    Comments:   [address] using a frequency of 10MHz with a 12L-RS transducer head was used to confirm needle placement.   Ultrasound images captured using [address] Ultrasound machine and scanned into patient's chart       Austin Russo PA-C, ATC

## 2023-09-08 ENCOUNTER — TELEPHONE (OUTPATIENT)
Age: 68
End: 2023-09-08

## 2023-09-08 NOTE — TELEPHONE ENCOUNTER
Called patient identified by two verifiers. Explained found disc and he could  at . Verbalized understanding thanked for the call.

## 2023-09-08 NOTE — TELEPHONE ENCOUNTER
Patient called and would like to know if his MRI disc and Xray from June or possibly July is still here and if so when can he come in office and pick it up        [phone]

## 2023-09-22 ENCOUNTER — HOSPITAL ENCOUNTER (OUTPATIENT)
Facility: HOSPITAL | Age: 68
End: 2023-09-22
Payer: MEDICARE

## 2023-09-22 DIAGNOSIS — R93.89 ABNORMAL FINDINGS ON DIAGNOSTIC IMAGING OF OTHER SPECIFIED BODY STRUCTURES: ICD-10-CM

## 2023-09-22 DIAGNOSIS — R06.02 SHORTNESS OF BREATH: ICD-10-CM

## 2023-09-22 LAB — CREAT UR-MCNC: 1 MG/DL (ref 0.6–1.3)

## 2023-09-22 PROCEDURE — 82565 ASSAY OF CREATININE: CPT

## 2023-09-22 PROCEDURE — 71260 CT THORAX DX C+: CPT

## 2023-09-22 PROCEDURE — 6360000004 HC RX CONTRAST MEDICATION

## 2023-09-22 RX ADMIN — IOPAMIDOL 80 ML: 612 INJECTION, SOLUTION INTRAVENOUS at 15:12

## 2023-10-02 ENCOUNTER — TELEPHONE (OUTPATIENT)
Age: 68
End: 2023-10-02

## 2023-10-02 DIAGNOSIS — M79.18 MYOFASCIAL PAIN: Primary | ICD-10-CM

## 2023-10-02 RX ORDER — METAXALONE 800 MG/1
800 TABLET ORAL 2 TIMES DAILY
Qty: 60 TABLET | Refills: 2 | Status: SHIPPED | OUTPATIENT
Start: 2023-10-02 | End: 2023-12-31

## 2023-10-02 NOTE — TELEPHONE ENCOUNTER
Patient says [facility] on [address] advised him that we denied refill for medication Metaxalone 800 mg. He should have 3 refills. Please contact pharmacy so patient can refill this medication as soon as possible.

## 2023-10-17 ENCOUNTER — TELEPHONE (OUTPATIENT)
Age: 68
End: 2023-10-17

## 2023-10-17 NOTE — TELEPHONE ENCOUNTER
He is getting ready to have surgery with Dr Elysia Burger and he just wanted to make sure that we had his original DOI was on 6-4-92 because Dr Estelle Gallo office had the wrong date. I told him to bring a copy of his workmans comp info to be scanned in. I explained to him that when he checks in to make sure he lets the  know that his workmans comp should be billed and not his insurance.

## 2023-10-17 NOTE — TELEPHONE ENCOUNTER
Patient Spouse is requesting a call back has a few questions about the exact date of injury of the patient and has a few other questions.       [phone]

## 2023-10-25 NOTE — PROGRESS NOTES
Detail Level: Detailed Kadie Bennett presents today for   Chief Complaint   Patient presents with    Back Pain       Is someone accompanying this pt? no    Is the patient using any DME equipment during OV? Yes, cane    Depression Screening:  PHQ-9 Questionaire 3/9/2023 8/26/2022 7/5/2022 4/20/2022 9/10/2021 9/8/2021 9/3/2021   Little interest or pleasure in doing things 0 0 0 0 0 0 0   Feeling down, depressed, or hopeless 0 0 0 0 0 0 0   PHQ-9 Total Score 0 0 0 0 0 0 0     PHQ Scores 3/9/2023 8/26/2022 7/5/2022 4/20/2022 1/11/2022 1/5/2022 12/1/2021   PHQ2 Score 0 0 0 0 - - -   PHQ2 Score - - - 0 0 0 0   PHQ9 Score 0 0 0 0 - - -       Learning Assessment:  Who is the primary learner? Patient    What is the preferred language for health care of the primary learner? ENGLISH    How does the primary learner prefer to learn new concepts? READING    How does the primary learner prefer to learn new concepts? PICTURES    Answered By patient    Relationship to Learner SELF        Coordination of Care:  1. Have you been to the ER, urgent care clinic since your last visit? no  Hospitalized since your last visit? no    2. Have you seen or consulted any other health care providers outside of the [address] [place] [address] since your last visit? Yes, Dr. Nay agarwal Lux any pap smears or colon screening.  no Detail Level: Zone

## 2023-10-27 ENCOUNTER — OFFICE VISIT (OUTPATIENT)
Age: 68
End: 2023-10-27

## 2023-10-27 VITALS — HEIGHT: 70 IN | WEIGHT: 237 LBS | BODY MASS INDEX: 33.93 KG/M2

## 2023-10-27 DIAGNOSIS — M17.11 PRIMARY OSTEOARTHRITIS OF RIGHT KNEE: ICD-10-CM

## 2023-10-27 DIAGNOSIS — Z96.652 PRESENCE OF LEFT ARTIFICIAL KNEE JOINT: ICD-10-CM

## 2023-10-27 DIAGNOSIS — M25.661 STIFFNESS OF RIGHT KNEE, NOT ELSEWHERE CLASSIFIED: ICD-10-CM

## 2023-10-27 DIAGNOSIS — M25.561 ACUTE PAIN OF RIGHT KNEE: ICD-10-CM

## 2023-10-27 DIAGNOSIS — Z91.81 HISTORY OF RECENT FALL: Primary | ICD-10-CM

## 2023-11-01 ENCOUNTER — HOSPITAL ENCOUNTER (OUTPATIENT)
Facility: HOSPITAL | Age: 68
Discharge: HOME | End: 2023-11-04
Payer: MEDICARE

## 2023-11-01 DIAGNOSIS — M25.661 STIFFNESS OF RIGHT KNEE, NOT ELSEWHERE CLASSIFIED: ICD-10-CM

## 2023-11-01 DIAGNOSIS — M25.561 ACUTE PAIN OF RIGHT KNEE: ICD-10-CM

## 2023-11-01 PROCEDURE — 73721 MRI JNT OF LWR EXTRE W/O DYE: CPT

## 2023-11-08 ENCOUNTER — OFFICE VISIT (OUTPATIENT)
Age: 68
End: 2023-11-08
Payer: MEDICARE

## 2023-11-08 VITALS — BODY MASS INDEX: 34.36 KG/M2 | HEIGHT: 70 IN | WEIGHT: 240 LBS

## 2023-11-08 DIAGNOSIS — M25.561 ACUTE PAIN OF RIGHT KNEE: Primary | ICD-10-CM

## 2023-11-08 PROCEDURE — G8484 FLU IMMUNIZE NO ADMIN: HCPCS | Performed by: PHYSICIAN ASSISTANT

## 2023-11-08 PROCEDURE — 1036F TOBACCO NON-USER: CPT | Performed by: PHYSICIAN ASSISTANT

## 2023-11-08 PROCEDURE — 99214 OFFICE O/P EST MOD 30 MIN: CPT | Performed by: PHYSICIAN ASSISTANT

## 2023-11-08 PROCEDURE — 1123F ACP DISCUSS/DSCN MKR DOCD: CPT | Performed by: PHYSICIAN ASSISTANT

## 2023-11-08 PROCEDURE — 3017F COLORECTAL CA SCREEN DOC REV: CPT | Performed by: PHYSICIAN ASSISTANT

## 2023-11-08 PROCEDURE — G8417 CALC BMI ABV UP PARAM F/U: HCPCS | Performed by: PHYSICIAN ASSISTANT

## 2023-11-08 PROCEDURE — G8428 CUR MEDS NOT DOCUMENT: HCPCS | Performed by: PHYSICIAN ASSISTANT

## 2023-11-10 NOTE — PROGRESS NOTES
[address] [place] [address] Drive  06-25-12    Right foot with excision of bursa and adipose tissue from fifth metatarsal base by Dr. Elena Wade Left 03/09/2022    left knee revision    PROSTATECTOMY  11/2018    ROTATOR CUFF REPAIR Right 01/28/2019    by Dr. Mackenzie Fernandez ARTHROSCOPY Left 12/2020    WORK RELATED INJURY    TOTAL KNEE ARTHROPLASTY Left 2018      Family History   Problem Relation Age of Onset    Rheum Arthritis Mother     Dementia Mother     Heart Disease Father     Cancer Father     Hypertension Other     Cancer Brother      Current Outpatient Medications   Medication Sig    metaxalone (SKELAXIN) 800 MG tablet Take 1 tablet by mouth in the morning and at bedtime    amLODIPine (NORVASC) 2.5 MG tablet K 1 TABLET BY MOUTH ONCE AT NIGHT IF BLOOD PRESSURE ABOVE 150/90    apixaban (ELIQUIS) 5 MG TABS tablet Take 1 tablet by mouth 2 times daily    Cholecalciferol (VITAMIN D3) 125 MCG (5000 UT) TABS Take 1 tablet by mouth daily    zinc 50 MG TABS tablet Take 1 tablet by mouth daily    diclofenac sodium (VOLTAREN) 1 % GEL Apply 4 g topically 4 times daily Dispense 5, 100g tubes. ezetimibe (ZETIA) 10 MG tablet Take 1 tablet by mouth daily    gabapentin (NEURONTIN) 300 MG capsule Take by mouth in the morning and at bedtime. 100 mg in the AM and 300 mg in the PM    acetaminophen (TYLENOL) 500 MG tablet Take 2 tablets by mouth every 6 hours as needed    ALPRAZolam (XANAX) 0.5 MG tablet Take 1 tablet by mouth nightly as needed.     butalbital-APAP-caffeine -40 MG CAPS per capsule Take 1 capsule by mouth every 6 hours as needed    labetalol (NORMODYNE) 100 MG tablet Take 1 tablet by mouth 2 times daily    lansoprazole (PREVACID) 30 MG delayed release capsule Take 1 capsule by mouth daily    lisinopril-hydroCHLOROthiazide (PRINZIDE;ZESTORETIC) 20-12.5 MG per tablet Take 1 tablet by mouth daily    metaxalone (SKELAXIN) 800 MG tablet Take 1 tablet by mouth 2 times

## 2023-11-13 ENCOUNTER — OFFICE VISIT (OUTPATIENT)
Age: 68
End: 2023-11-13
Payer: MEDICARE

## 2023-11-13 VITALS — WEIGHT: 240 LBS | BODY MASS INDEX: 34.36 KG/M2 | HEIGHT: 70 IN

## 2023-11-13 DIAGNOSIS — M17.11 PRIMARY OSTEOARTHRITIS OF RIGHT KNEE: ICD-10-CM

## 2023-11-13 DIAGNOSIS — S83.241A TEAR OF MEDIAL MENISCUS OF RIGHT KNEE, UNSPECIFIED TEAR TYPE, UNSPECIFIED WHETHER OLD OR CURRENT TEAR, INITIAL ENCOUNTER: Primary | ICD-10-CM

## 2023-11-13 PROCEDURE — G8427 DOCREV CUR MEDS BY ELIG CLIN: HCPCS | Performed by: ORTHOPAEDIC SURGERY

## 2023-11-13 PROCEDURE — G8417 CALC BMI ABV UP PARAM F/U: HCPCS | Performed by: ORTHOPAEDIC SURGERY

## 2023-11-13 PROCEDURE — 1036F TOBACCO NON-USER: CPT | Performed by: ORTHOPAEDIC SURGERY

## 2023-11-13 PROCEDURE — 3017F COLORECTAL CA SCREEN DOC REV: CPT | Performed by: ORTHOPAEDIC SURGERY

## 2023-11-13 PROCEDURE — 99213 OFFICE O/P EST LOW 20 MIN: CPT | Performed by: ORTHOPAEDIC SURGERY

## 2023-11-13 PROCEDURE — 1123F ACP DISCUSS/DSCN MKR DOCD: CPT | Performed by: ORTHOPAEDIC SURGERY

## 2023-11-13 PROCEDURE — G8484 FLU IMMUNIZE NO ADMIN: HCPCS | Performed by: ORTHOPAEDIC SURGERY

## 2023-12-07 NOTE — PROGRESS NOTES
(previously mild to moderate). L5-S1: Annular bulge, discogenic osteophytes, facet arthropathy and ligamentum  flavum thickening. Right hemilaminectomy again noted. Redemonstrated moderate to  severe bilateral foraminal stenosis. No significant spinal canal narrowing. Impression  1. Interval development of a small focus of marrow edema at the right superior  endplate of L1. While nonspecific this could reflect small acute Schmorl node. Short interval follow-up imaging may be helpful to ensure stability/resolution  (for instance, MRI in 6-12 months). 2. Dating back to 2013, slight interval enlargement of circumscribed  approximately 4 mm enhancing lesion along right cauda equina nerve roots at the  level of L2. This may reflect a small, benign nerve sheath tumor. 3. Mild interval progression of degenerative changes in the lower lumbar region  as detailed. The most pronounced areas of spinal canal and foraminal stenosis  are as follows:  -Moderate to severe right lateral recess stenosis at L3-L4 and L4-L5  -Moderate to severe bilateral foraminal stenosis at L4-L5 and L5-S1. Lumbar 4v x-ray's from 06/14/2023 was personally reviewed with the patient and demonstrated:  Multi-level degenerative facet. Degenerative discs L3/4, L4/5 L5/S1. Bridging osteophytes L3/4. No instability. Degenerative joint findings in both hips. Lumbar spine MRI from 01/24/2018 was personally reviewed with the patient and demonstrated:  FINDINGS:      Prior laminotomies on the left at L4-5 and bilaterally at L5-S1. No fracture,  bone destruction, or fluid collection. Intact lordosis. Normal vertebral body heights. Small less than 5 mm low signal  focus within anterior L4, developed  since 2013. No similar focus elsewhere. No  aggressive features. Otherwise generally fatty marrow signal with some chronic  fatty endplate changes straddling L5-S1.  Moderate to severe disc space narrowing  and disc desiccation of that

## 2023-12-13 ENCOUNTER — OFFICE VISIT (OUTPATIENT)
Age: 68
End: 2023-12-13

## 2023-12-13 VITALS
RESPIRATION RATE: 16 BRPM | DIASTOLIC BLOOD PRESSURE: 69 MMHG | SYSTOLIC BLOOD PRESSURE: 127 MMHG | TEMPERATURE: 97.9 F | HEART RATE: 61 BPM | WEIGHT: 244 LBS | BODY MASS INDEX: 34.93 KG/M2 | HEIGHT: 70 IN

## 2023-12-13 DIAGNOSIS — M48.062 SPINAL STENOSIS OF LUMBAR REGION WITH NEUROGENIC CLAUDICATION: Primary | ICD-10-CM

## 2023-12-13 DIAGNOSIS — M47.816 LUMBAR FACET ARTHROPATHY: ICD-10-CM

## 2023-12-13 DIAGNOSIS — M53.3 SACROILIAC JOINT PAIN: ICD-10-CM

## 2023-12-13 DIAGNOSIS — Z79.01 LONG TERM (CURRENT) USE OF ANTICOAGULANTS: ICD-10-CM

## 2023-12-13 DIAGNOSIS — M54.16 LUMBAR RADICULITIS: ICD-10-CM

## 2023-12-13 DIAGNOSIS — M79.18 MYOFASCIAL PAIN: ICD-10-CM

## 2023-12-13 RX ORDER — GABAPENTIN 300 MG/1
CAPSULE ORAL
Qty: 120 CAPSULE | Refills: 2 | Status: SHIPPED | OUTPATIENT
Start: 2024-01-09 | End: 2024-07-05

## 2023-12-13 RX ORDER — METAXALONE 800 MG/1
800 TABLET ORAL 2 TIMES DAILY
Qty: 60 TABLET | Refills: 5 | Status: SHIPPED | OUTPATIENT
Start: 2023-12-13 | End: 2024-06-10

## 2023-12-21 ENCOUNTER — HOSPITAL ENCOUNTER (OUTPATIENT)
Facility: HOSPITAL | Age: 68
Setting detail: RECURRING SERIES
Discharge: HOME | End: 2023-12-24
Payer: MEDICARE

## 2023-12-21 PROCEDURE — 97110 THERAPEUTIC EXERCISES: CPT

## 2023-12-21 PROCEDURE — 97162 PT EVAL MOD COMPLEX 30 MIN: CPT

## 2023-12-21 PROCEDURE — 97535 SELF CARE MNGMENT TRAINING: CPT

## 2023-12-21 NOTE — THERAPY EVALUATION
[facility]  [address] [place], [place] [address]illa YO:[phone] Fx: [phone]  Plan of Care / Statement of Necessity for Physical Therapy Services     Patient Name: Samantha Mendez : 1955   Medical   Diagnosis: Pain in right knee [M25.561] Treatment Diagnosis: M25.561  RIGHT KNEE PAIN and M25.562  LEFT KNEE PAIN       Onset Date: Chronic; exacerbated May 2023 Payor :  Payor: MEDICARE / Plan: MEDICARE PART A AND B / Product Type: *No Product type* /    Referral Source: Kota Medrano PA-C [place] [facility]): 2023   Prior Hospitalization: See medical history Provider #: [id number]   Prior Level of Function: Lives in 1 story home with 3 steps to enter with spouse; retired; enjoys gardening, hiking, camping, and fishing; helps care for 3year-old granddaughter   Comorbidities: Arthritis, history of left TKA () with revision (), history of B RTC repair, neck and back pain; history of 2 back surgeries, HTN, PVD, history of blood clots     Assessment / key information:  Patient is a 70-year-old right-handed male who presents to therapy with chief complaint of B knee pain (right>left). Symptoms worsened in right knee following hospitalization for left LE infection. He may have TKA in April and back surgery sometime next year; patient has had cortisone and gel injections to his right knee. Patient reports he \"flops\" to sit as his leg does not bend. Symptoms aggravated with standing from chair and ambulating > 30 min; symptoms reduced with walking and ice/elevation. MRI from  2023 revealed \"1. Moderate to severe tricompartmental degenerative change, worse in the medial compartment. Associated degenerative meniscal pathologies. 2.  Mucoid degeneration of the ACL with likely ACL cyst. 3.  Inflammation of the iliotibial band insertion, correlate for IT band syndrome. 4. Small joint effusion. 5.  Striking superficial soft tissue edema

## 2023-12-21 NOTE — PROGRESS NOTES
PT DAILY TREATMENT NOTE/KNEE EVAL       Patient Name: Abner Tatum    Date: 2023    : 1955  Insurance: Payor: MEDICARE / Plan: MEDICARE PART A AND B / Product Type: *No Product type* /      Patient  verified yes     Visit #   Current / Total 1 16   Time   In / Out 5:16P 6:15P   Pain   In / Out 3-4/10 5/10   Subjective Functional Status/Changes: See poc     Treatment Area: Pain in right knee [M25.561]    SUBJECTIVE  Pain Level (0-10 scale): at worst: 7-8/10  []constant []intermittent []improving []worsening []no change since onset    Any medication changes, allergies to medications, adverse drug reactions, diagnosis change, or new procedure performed?: [x] No    [] Yes (see summary sheet for update)  Subjective functional status/changes:       Prior Level of Function: Lives in 1 story home with 3 steps to enter with spouse; retired; enjoys gardening, hiking, camping, and fishing; helps care for 3year-old granddaughter   Comorbidities: Arthritis, history of left TKA () with revision (), history of B RTC repair, neck and back pain; history of 2 back surgeries, HTN, PVD, history of blood clots       31 min [x]Eval  - untimed                   Therapeutic Procedures: Tx Min Billable or 1:1 Min (if diff from Tx Min) Procedure, Rationale, Specifics   18  [id number] Self Care/Home Management (timed):  improve patient knowledge and understanding of pain reducing techniques, positioning, posture/ergonomics, home safety, activity modification, diagnosis/prognosis, and physical therapy expectations, procedures and progression  to improve patient's ability to progress to PLOF and address remaining functional goals.   (see flow sheet as applicable)     Details if applicable:  taking rest breaks with driving, walking program for endurance/overall; use of rollator for longer distances with ambulation of cane in the home; icing after evaluation   10  [id number] Therapeutic Exercise (timed):  increase ROM,

## 2023-12-27 ENCOUNTER — HOSPITAL ENCOUNTER (OUTPATIENT)
Facility: HOSPITAL | Age: 68
Setting detail: RECURRING SERIES
Discharge: HOME | End: 2023-12-30
Payer: MEDICARE

## 2023-12-27 PROCEDURE — 97535 SELF CARE MNGMENT TRAINING: CPT

## 2023-12-27 PROCEDURE — 97530 THERAPEUTIC ACTIVITIES: CPT

## 2023-12-27 PROCEDURE — 97110 THERAPEUTIC EXERCISES: CPT

## 2023-12-27 NOTE — PROGRESS NOTES
proper movement patterns, analyze and modify for postural abnormalities, analyze and address imbalance/dizziness, and instruct in home and community integration to address functional deficits and attain remaining goals. Progress toward goals / Updated goals:  []  See Progress Note/Recertification  Short Term Goals: To be accomplished in 4 weeks  Patient will report compliance with initial HEP to optimize therapy outcomes. Status at evaluation: established  MET    2. Patient will improve FOTO score by at least 5 points in order to demonstrate functional improvement. Status at evaluation: 29/100  3. Patient will improve right knee flex AROM by at leat 10 deg in order to perform daily tasks with increased ease. Status at evaluation: left 98 deg, right 81 deg  4. Patient will report standing/ambulation tolerance of at least 40 min in order to perform household management and recreational tasks with increased ease. Status at evaluation: 30 min     Long Term Goals: To be accomplished in 8 weeks  Patient will improve FOTO score to at least 51/100 points in order to demonstrate functional improvement. Status at evaluation: 29/100  2. Patient will report no falls since start of care in order to demonstrate improve safety in home and community. Status at evaluation: last fall in Sept. 2023  3. Patient will report standing/ambulation tolerance of at least 1 hour in order to perform household management and recreational tasks with increased ease. Status at evaluation: 30 min  4. Patient will improve right LE strength to at least 4-/5 with MMT in order to ambulate and perform functional tasks with increased ease.   Status at evaluation:                     Left Right   Hip Flexion 4 2+ pain     ER 4+ 2+ pain        IR Unable to assess-max sharp pain to lateral knee 2+    Knee Flexion 4 2+     Extension 5 5   Ankle Plantarflexion         Dorsiflexion 5 5

## 2024-01-05 ENCOUNTER — HOSPITAL ENCOUNTER (OUTPATIENT)
Facility: HOSPITAL | Age: 69
Setting detail: RECURRING SERIES
Discharge: HOME | End: 2024-01-08
Payer: MEDICARE

## 2024-01-05 PROCEDURE — 97140 MANUAL THERAPY 1/> REGIONS: CPT

## 2024-01-05 PROCEDURE — 97110 THERAPEUTIC EXERCISES: CPT

## 2024-01-05 NOTE — PROGRESS NOTES
to ambulate and perform functional tasks with increased ease.  Status at evaluation:                     Left Right   Hip Flexion 4 2+ pain     ER 4+ 2+ pain        IR Unable to assess-max sharp pain to lateral knee 2+    Knee Flexion 4 2+     Extension 5 5   Ankle Plantarflexion         Dorsiflexion 5 5      Next PN/ RC due 1/19/2024  Auth due RACHEAL    PLAN  - Continue Plan of Care  - Upgrade activities as tolerated    Laura M Behrens, PTA    1/5/2024    7:37 AM    Future Appointments   Date Time Provider Department Center   1/5/2024  2:00 PM Behrens, Laura M, PTA [facility]   1/8/2024  2:00 PM Myra Gonzales, PT [facility]   1/11/2024  4:00 PM Myra Gonzales, MOOSE [facility]   1/24/2024  3:00 PM Reyes, Charlene N, PA [facility]   4/15/2024  2:40 PM Aminta Pena MD [facility]

## 2024-01-08 ENCOUNTER — HOSPITAL ENCOUNTER (OUTPATIENT)
Facility: HOSPITAL | Age: 69
Setting detail: RECURRING SERIES
End: 2024-01-08
Payer: MEDICARE

## 2024-01-08 NOTE — PROGRESS NOTES
PHYSICAL / OCCUPATIONAL THERAPY - DAILY TREATMENT NOTE    Patient Name: Kalen Rivas    Date: 2024    : 1955  Insurance: Payor: MEDICARE / Plan: MEDICARE PART A AND B / Product Type: *No Product type* /      Patient  verified {YES/NO:81355}     Visit #   Current / Total 4 16   Time   In / Out *** ***   Pain   In / Out *** ***   Subjective Functional Status/Changes: ***     TREATMENT AREA =  Pain in right knee [M25.561]  Pain in left knee [M25.562]    OBJECTIVE    {[facility] Modality Grid:94726}     Therapeutic Procedures:    Tx Min Billable or 1:1 Min (if diff from Tx Min) Procedure, Rationale, Specifics     {[facility] Ther Procedures:94017}     Details if applicable:         {[facility] Ther Procedures:91894}     Details if applicable:       {[facility] Ther Procedures:46748}     Details if applicable:       {[facility] Ther Procedures:94305}     Details if applicable:       {[facility] Ther Procedures:48792}     Details if applicable:       [facility] Totals Reminder: bill using total billable min of TIMED therapeutic procedures (example: do not include dry needle or estim unattended, both untimed codes, in totals to left)  8-22 min = 1 unit; 23-37 min = 2 units; 38-52 min = 3 units; 53-67 min = 4 units; 68-82 min = 5 units   Total Total     [x]  Patient Education billed concurrently with other procedures   [x] Review HEP    [] Progressed/Changed HEP, detail:    [] Other detail:       Objective Information/Functional Measures/Assessment    ***    Patient will continue to benefit from skilled PT / OT services to {[facility]:85103} to address functional deficits and attain remaining goals.    Progress toward goals / Updated goals:  []  See Progress Note/Recertification    Short Term Goals: To be accomplished in 4 weeks  Patient will report compliance with initial HEP to optimize therapy outcomes.  Status at evaluation: established  MET     2. Patient will improve FOTO score by at least 5 points in

## 2024-01-09 ENCOUNTER — APPOINTMENT (OUTPATIENT)
Facility: HOSPITAL | Age: 69
End: 2024-01-09
Payer: MEDICARE

## 2024-01-11 ENCOUNTER — HOSPITAL ENCOUNTER (OUTPATIENT)
Facility: HOSPITAL | Age: 69
Setting detail: RECURRING SERIES
Discharge: HOME | End: 2024-01-14
Payer: MEDICARE

## 2024-01-11 DIAGNOSIS — M79.18 MYOFASCIAL PAIN: ICD-10-CM

## 2024-01-11 PROCEDURE — 97110 THERAPEUTIC EXERCISES: CPT

## 2024-01-11 PROCEDURE — 97140 MANUAL THERAPY 1/> REGIONS: CPT

## 2024-01-11 NOTE — PROGRESS NOTES
Patient continues with left knee edema and cyst to posterior right knee limiting ROM and contributing to continued pain. He has been working on HEP with attempting to prevent increased pain and responds well to manual intervention and modality use. Plan to continue to address ROM and strength as able to improve overall B knee stability.    Patient will continue to benefit from skilled PT / OT services to modify and progress therapeutic interventions, analyze and address functional mobility deficits, analyze and address ROM deficits, analyze and address strength deficits, analyze and address soft tissue restrictions, analyze and cue for proper movement patterns, analyze and modify for postural abnormalities, analyze and address imbalance/dizziness, and instruct in home and community integration to address functional deficits and attain remaining goals.    Progress toward goals / Updated goals:  []  See Progress Note/Recertification    Short Term Goals: To be accomplished in 4 weeks  Patient will report compliance with initial HEP to optimize therapy outcomes.  Status at evaluation: established  MET     2. Patient will improve FOTO score by at least 5 points in order to demonstrate functional improvement.              Status at evaluation: 29/100     3. Patient will improve right knee flex AROM by at leat 10 deg in order to perform daily tasks with increased ease.              Status at evaluation: left 98 deg, right 81 deg  Right knee flexion AAROM 105 degrees after mobs and heel slides (1/5/24)     4. Patient will report standing/ambulation tolerance of at least 40 min in order to perform household management and recreational tasks with increased ease.              Status at evaluation: 30 min     Long Term Goals: To be accomplished in 8 weeks  Patient will improve FOTO score to at least 51/100 points in order to demonstrate functional improvement.  Status at evaluation: 29/100  2. Patient will report no falls since

## 2024-01-12 RX ORDER — METAXALONE 800 MG/1
800 TABLET ORAL 2 TIMES DAILY
Qty: 60 TABLET | Refills: 2 | OUTPATIENT
Start: 2024-01-12 | End: 2024-04-11

## 2024-01-15 ENCOUNTER — HOSPITAL ENCOUNTER (OUTPATIENT)
Facility: HOSPITAL | Age: 69
Setting detail: RECURRING SERIES
Discharge: HOME | End: 2024-01-18
Payer: MEDICARE

## 2024-01-15 PROCEDURE — 97140 MANUAL THERAPY 1/> REGIONS: CPT

## 2024-01-15 PROCEDURE — 97110 THERAPEUTIC EXERCISES: CPT

## 2024-01-15 NOTE — PROGRESS NOTES
Status at evaluation: 30 min  4. Patient will improve right LE strength to at least 4-/5 with MMT in order to ambulate and perform functional tasks with increased ease.  Status at evaluation:                     Left Right   Hip Flexion 4 2+ pain     ER 4+ 2+ pain        IR Unable to assess-max sharp pain to lateral knee 2+    Knee Flexion 4 2+     Extension 5 5   Ankle Plantarflexion         Dorsiflexion 5 5      Next PN/ RC due 1/19/2024  Auth due RACHEAL    PLAN  - Continue Plan of Care  - Upgrade activities as tolerated    Laura M Behrens, PTA    1/15/2024    6:59 AM    Future Appointments   Date Time Provider Department Center   1/15/2024 10:40 AM Behrens, Laura M, PTA [facility]   1/18/2024  4:00 PM Behrens, Laura M, PTA [facility]   1/23/2024  4:00 PM Behrens, Laura M, PTA [facility]   1/24/2024  3:00 PM Reyes, Charlene N, PA [facility]   1/25/2024  4:00 PM Behrens, Laura M, PTA [facility]   4/15/2024  2:40 PM Aminta Pena MD [facility]

## 2024-01-18 ENCOUNTER — HOSPITAL ENCOUNTER (OUTPATIENT)
Facility: HOSPITAL | Age: 69
Setting detail: RECURRING SERIES
End: 2024-01-18
Payer: MEDICARE

## 2024-01-23 ENCOUNTER — HOSPITAL ENCOUNTER (OUTPATIENT)
Facility: HOSPITAL | Age: 69
Setting detail: RECURRING SERIES
Discharge: HOME | End: 2024-01-26
Payer: MEDICARE

## 2024-01-23 PROCEDURE — 97110 THERAPEUTIC EXERCISES: CPT

## 2024-01-23 PROCEDURE — 97530 THERAPEUTIC ACTIVITIES: CPT

## 2024-01-23 PROCEDURE — 97140 MANUAL THERAPY 1/> REGIONS: CPT

## 2024-01-23 NOTE — PROGRESS NOTES
PHYSICAL / OCCUPATIONAL THERAPY - DAILY TREATMENT NOTE    Patient Name: Kalen Rivas    Date: 2024    : 1955  Insurance: Payor: MEDICARE / Plan: MEDICARE PART A AND B / Product Type: *No Product type* /      Patient  verified yes   Visit #   Current / Total 6 16   Time   In / Out 4:00 5:03   Pain   In / Out 5/10 right knee; 4/10 left knee 4/10   Subjective Functional Status/Changes: Pt reports his right knee has really been bothering him. He is going to have it replaced 24. He pulled his walker down the other day to help with getting up. He has felt like the left knee has been a little better. He is having a hard time bending his right knee.      TREATMENT AREA =  Pain in right knee [M25.561]  Pain in left knee [M25.562]      OBJECTIVE    Modalities Rationale:     decrease edema, decrease inflammation, decrease pain, and increase tissue extensibility to improve patient's ability to progress to PLOF and address remaining functional goals.     min [] Estim Unattended, type/location:                                      []  w/ice    []  w/heat    min [] Estim Attended, type/location:                                     []  w/US     []  w/ice    []  w/heat    []  TENS insruct      min []  Mechanical Traction: type/lbs                   []  pro   []  sup   []  int   []  cont    []  before manual    []  after manual    min []  Ultrasound, settings/location:     10 min  unbill [x]  Ice     [x]  Heat    location/position: Ice to left knee and heat to right knee; semi reclined with B LE elevated    min []  Paraffin,  details:     min []  Vasopneumatic Device, press/temp:     min []  Whirlpool / Fluido:    If using vaso (only need to measure limb vaso being performed on)      pre-treatment girth :       post-treatment girth :       measured at (landmark location) :      min []  Other:    Skin assessment post-treatment:   Intact      Therapeutic Procedures:    Tx Min Billable or 1:1 Min (if diff from Tx

## 2024-01-23 NOTE — PROGRESS NOTES
[facility]  [address] [place] [address] - Ph: [phone]   Fx: [phone]  PHYSICAL THERAPY PROGRESS NOTE  [x] Progress Note  [] Discharge Summary    Patient Name: Kalen Rivas : 1955   Treatment/Medical Diagnosis: Pain in right knee [M25.561]  Pain in left knee [M25.562]   Referral Source: Mateo Headley PA-C     Date of Initial Visit: 2023 Attended Visits: 6 Missed Visits: 1       Comorbidities: Arthritis, history of left TKA () with revision (), history of B RTC repair, neck and back pain; history of 2 back surgeries, HTN, PVD, history of blood clots  Prior Level of Function:Lives in 1 story home with 3 steps to enter with spouse; retired; enjoys gardening, hiking, camping, and fishing; helps care for 2-year-old granddaughter    SUMMARY OF TREATMENT  Mr. Rivas is making slow progress towards goals in therapy due to ongoing increased B knee pain right>left. He is limited by posterior knee swelling and quad tightness with right knee flexion AROM (82 degrees before manual and 90 degrees post manual). He continues to have moderate pain (4-5/10) in both knees with ongoing left knee swelling as well. He is ambulating with SPC and occasionally his walker depending on pain level. His FOTO did slightly improve to 33/100 but he remains functionally limited with all ADLs and household chores. His standing tolerance is 3-4 minutes before increased pain and his walking tolerance is 15-30 minutes. He plans to undergo a right TKA on 2024. Skilled PT remains medically necessary to progress right knee AROM and strength in preparation for TKA and to maintain decreased pain levels and decrease fall risk.     CURRENT STATUS/Progress towards Goals:    Short Term Goals: To be accomplished in 4 weeks  Patient will report compliance with initial HEP to optimize therapy outcomes.  Status at evaluation: established  Current: Met

## 2024-01-25 ENCOUNTER — HOSPITAL ENCOUNTER (OUTPATIENT)
Facility: HOSPITAL | Age: 69
Setting detail: RECURRING SERIES
Discharge: HOME | End: 2024-01-28
Payer: MEDICARE

## 2024-01-25 PROCEDURE — 97110 THERAPEUTIC EXERCISES: CPT

## 2024-01-25 PROCEDURE — 97530 THERAPEUTIC ACTIVITIES: CPT

## 2024-01-25 NOTE — PROGRESS NOTES
PHYSICAL / OCCUPATIONAL THERAPY - DAILY TREATMENT NOTE    Patient Name: Kalen Rivas    Date: 2024    : 1955  Insurance: Payor: MEDICARE / Plan: MEDICARE PART A AND B / Product Type: *No Product type* /      Patient  verified yes   Visit #   Current / Total 7 16   Time   In / Out 4:00 4:56   Pain   In / Out 4/10 right; 3/10 left  4/10   Subjective Functional Status/Changes: Pt reports ongoing increased right>left knee pain. He has been trying to use ice and heat. He hasn't had as much sciatic pain. He has decreasing walking/standing tolerance as the right knee is more painful.      TREATMENT AREA =  Pain in right knee [M25.561]  Pain in left knee [M25.562]      OBJECTIVE    Modalities Rationale:     decrease edema, decrease inflammation, decrease pain, and increase tissue extensibility to improve patient's ability to progress to PLOF and address remaining functional goals.     min [] Estim Unattended, type/location:                                      []  w/ice    []  w/heat    min [] Estim Attended, type/location:                                     []  w/US     []  w/ice    []  w/heat    []  TENS insruct      min []  Mechanical Traction: type/lbs                   []  pro   []  sup   []  int   []  cont    []  before manual    []  after manual    min []  Ultrasound, settings/location:     10 min  unbill [x]  Ice     [x]  Heat    location/position: Ice to left knee and heat to right knee; semi reclined with B LE elevated    min []  Paraffin,  details:     min []  Vasopneumatic Device, press/temp:     min []  Whirlpool / Fluido:    If using vaso (only need to measure limb vaso being performed on)      pre-treatment girth :       post-treatment girth :       measured at (landmark location) :      min []  Other:    Skin assessment post-treatment:   Intact      Therapeutic Procedures:    Tx Min Billable or 1:1 Min (if diff from Tx Min) Procedure, Rationale, Specifics   90 [id number] Therapeutic Activity

## 2024-01-29 ENCOUNTER — HOSPITAL ENCOUNTER (OUTPATIENT)
Facility: HOSPITAL | Age: 69
Setting detail: RECURRING SERIES
Discharge: HOME | End: 2024-02-01
Payer: MEDICARE

## 2024-01-29 PROCEDURE — 97110 THERAPEUTIC EXERCISES: CPT

## 2024-01-29 NOTE — PROGRESS NOTES
PHYSICAL / OCCUPATIONAL THERAPY - DAILY TREATMENT NOTE    Patient Name: Kalen Rivas    Date: 2024    : 1955  Insurance: Payor: MEDICARE / Plan: MEDICARE PART A AND B / Product Type: *No Product type* /      Patient  verified yes   Visit #   Current / Total 9 16   Time   In / Out 2:00P 2:54P   Pain   In / Out 5/10 3-4/10   Subjective Functional Status/Changes: Patient reports he feels a little worse. He is not sure if it is the weather. He will have right knee surgery in Feb. He has not fallen recently. He feels better sometimes but then the pain returns. He is waiting clearance from PCP to have surgery. He his having a little sciatic nerve pain in his left buttocks but his back is not really bothering him. His legs hurt down to his ankle. His right knee just swells where the cyst is; he is not sure if they will remove it. He will get a bike following surgery. He will sit to bathe to wash his lower body; he cannot cross his right leg over.      TREATMENT AREA =  Pain in right knee [M25.561]  Pain in left knee [M25.562]    OBJECTIVE    Modalities Rationale:     decrease edema, decrease inflammation, decrease pain, and increase tissue extensibility to improve patient's ability to progress to PLOF and address remaining functional goals.                  min [] Estim Unattended, type/location:                                                 []  w/ice    []  w/heat     min [] Estim Attended, type/location:                                                []  w/US     []  w/ice    []  w/heat    []  TENS insruct       min []  Mechanical Traction: type/lbs                    []  pro   []  sup   []  int   []  cont    []  before manual    []  after manual     min []  Ultrasound, settings/location:      10 min  unbill [x]  Ice     [x]  Heat    location/position: Ice to left knee and heat to right knee; semi reclined with B LE elevated     min []  Paraffin,  details:       min []  Vasopneumatic Device,

## 2024-01-31 ENCOUNTER — HOSPITAL ENCOUNTER (OUTPATIENT)
Facility: HOSPITAL | Age: 69
Setting detail: RECURRING SERIES
Discharge: HOME | End: 2024-02-03
Payer: MEDICARE

## 2024-01-31 ENCOUNTER — HOSPITAL ENCOUNTER (OUTPATIENT)
Facility: HOSPITAL | Age: 69
Discharge: HOME | End: 2024-02-03
Payer: MEDICARE

## 2024-01-31 ENCOUNTER — TRANSCRIBE ORDERS (OUTPATIENT)
Facility: HOSPITAL | Age: 69
End: 2024-01-31

## 2024-01-31 DIAGNOSIS — M17.11 OSTEOARTHRITIS OF RIGHT KNEE, UNSPECIFIED OSTEOARTHRITIS TYPE: Primary | ICD-10-CM

## 2024-01-31 DIAGNOSIS — M17.11 OSTEOARTHRITIS OF RIGHT KNEE, UNSPECIFIED OSTEOARTHRITIS TYPE: ICD-10-CM

## 2024-01-31 LAB
ALBUMIN SERPL-MCNC: 3.6 G/DL (ref 3.4–5)
ALBUMIN/GLOB SERPL: 1.2 (ref 0.8–1.7)
ALP SERPL-CCNC: 74 U/L (ref 45–117)
ALT SERPL-CCNC: 32 U/L (ref 16–61)
ANION GAP SERPL CALC-SCNC: 4 MMOL/L (ref 3–18)
APPEARANCE UR: CLEAR
APTT PPP: 30.8 SEC (ref 23–36.4)
AST SERPL-CCNC: 27 U/L (ref 10–38)
BASOPHILS # BLD: 0.1 K/UL (ref 0–0.1)
BASOPHILS NFR BLD: 1 % (ref 0–2)
BILIRUB SERPL-MCNC: 0.3 MG/DL (ref 0.2–1)
BILIRUB UR QL: NEGATIVE
BUN SERPL-MCNC: 15 MG/DL (ref 7–18)
BUN/CREAT SERPL: 15 (ref 12–20)
CALCIUM SERPL-MCNC: 9.4 MG/DL (ref 8.5–10.1)
CHLORIDE SERPL-SCNC: 105 MMOL/L (ref 100–111)
CO2 SERPL-SCNC: 29 MMOL/L (ref 21–32)
COLOR UR: YELLOW
CREAT SERPL-MCNC: 0.97 MG/DL (ref 0.6–1.3)
DIFFERENTIAL METHOD BLD: ABNORMAL
EKG ATRIAL RATE: 68 BPM
EKG DIAGNOSIS: NORMAL
EKG P AXIS: 62 DEGREES
EKG P-R INTERVAL: 202 MS
EKG Q-T INTERVAL: 384 MS
EKG QRS DURATION: 88 MS
EKG QTC CALCULATION (BAZETT): 408 MS
EKG R AXIS: 12 DEGREES
EKG T AXIS: 61 DEGREES
EKG VENTRICULAR RATE: 68 BPM
EOSINOPHIL # BLD: 0.1 K/UL (ref 0–0.4)
EOSINOPHIL NFR BLD: 2 % (ref 0–5)
ERYTHROCYTE [DISTWIDTH] IN BLOOD BY AUTOMATED COUNT: 12.2 % (ref 11.6–14.5)
ERYTHROCYTE [SEDIMENTATION RATE] IN BLOOD: 7 MM/HR (ref 0–20)
EST. AVERAGE GLUCOSE BLD GHB EST-MCNC: 123 MG/DL
GLOBULIN SER CALC-MCNC: 3 G/DL (ref 2–4)
GLUCOSE SERPL-MCNC: 86 MG/DL (ref 74–99)
GLUCOSE UR STRIP.AUTO-MCNC: NEGATIVE MG/DL
HBA1C MFR BLD: 5.9 % (ref 4.2–5.6)
HCT VFR BLD AUTO: 39.7 % (ref 36–48)
HGB BLD-MCNC: 13.7 G/DL (ref 13–16)
HGB UR QL STRIP: NEGATIVE
IMM GRANULOCYTES # BLD AUTO: 0 K/UL (ref 0–0.04)
IMM GRANULOCYTES NFR BLD AUTO: 0 % (ref 0–0.5)
INR PPP: 1.2 (ref 0.9–1.1)
KETONES UR QL STRIP.AUTO: NEGATIVE MG/DL
LEUKOCYTE ESTERASE UR QL STRIP.AUTO: NEGATIVE
LYMPHOCYTES # BLD: 1.2 K/UL (ref 0.9–3.6)
LYMPHOCYTES NFR BLD: 23 % (ref 21–52)
MCH RBC QN AUTO: 32.5 PG (ref 24–34)
MCHC RBC AUTO-ENTMCNC: 34.5 G/DL (ref 31–37)
MCV RBC AUTO: 94.3 FL (ref 78–100)
MONOCYTES # BLD: 0.8 K/UL (ref 0.05–1.2)
MONOCYTES NFR BLD: 15 % (ref 3–10)
NEUTS SEG # BLD: 3.2 K/UL (ref 1.8–8)
NEUTS SEG NFR BLD: 59 % (ref 40–73)
NITRITE UR QL STRIP.AUTO: NEGATIVE
NRBC # BLD: 0 K/UL (ref 0–0.01)
NRBC BLD-RTO: 0 PER 100 WBC
PH UR STRIP: 6.5 (ref 5–8)
PLATELET # BLD AUTO: 185 K/UL (ref 135–420)
PMV BLD AUTO: 10.2 FL (ref 9.2–11.8)
POTASSIUM SERPL-SCNC: 4.1 MMOL/L (ref 3.5–5.5)
PROT SERPL-MCNC: 6.6 G/DL (ref 6.4–8.2)
PROT UR STRIP-MCNC: NEGATIVE MG/DL
PROTHROMBIN TIME: 15 SEC (ref 11.9–14.7)
RBC # BLD AUTO: 4.21 M/UL (ref 4.35–5.65)
SODIUM SERPL-SCNC: 138 MMOL/L (ref 136–145)
SP GR UR REFRACTOMETRY: 1.01 (ref 1–1.03)
UROBILINOGEN UR QL STRIP.AUTO: 0.2 EU/DL (ref 0.2–1)
WBC # BLD AUTO: 5.4 K/UL (ref 4.6–13.2)

## 2024-01-31 PROCEDURE — 97140 MANUAL THERAPY 1/> REGIONS: CPT

## 2024-01-31 PROCEDURE — 85652 RBC SED RATE AUTOMATED: CPT

## 2024-01-31 PROCEDURE — 36415 COLL VENOUS BLD VENIPUNCTURE: CPT

## 2024-01-31 PROCEDURE — 80053 COMPREHEN METABOLIC PANEL: CPT

## 2024-01-31 PROCEDURE — 97110 THERAPEUTIC EXERCISES: CPT

## 2024-01-31 PROCEDURE — 83036 HEMOGLOBIN GLYCOSYLATED A1C: CPT

## 2024-01-31 PROCEDURE — 93005 ELECTROCARDIOGRAM TRACING: CPT

## 2024-01-31 PROCEDURE — 85610 PROTHROMBIN TIME: CPT

## 2024-01-31 PROCEDURE — 85025 COMPLETE CBC W/AUTO DIFF WBC: CPT

## 2024-01-31 PROCEDURE — 85730 THROMBOPLASTIN TIME PARTIAL: CPT

## 2024-01-31 PROCEDURE — 81003 URINALYSIS AUTO W/O SCOPE: CPT

## 2024-01-31 NOTE — PROGRESS NOTES
PHYSICAL / OCCUPATIONAL THERAPY - DAILY TREATMENT NOTE    Patient Name: Kalen Rivas    Date: 2024    : 1955  Insurance: Payor: MEDICARE / Plan: MEDICARE PART A AND B / Product Type: *No Product type* /      Patient  verified yes   Visit #   Current / Total 9 16   Time   In / Out 4:41P 5:40P   Pain   In / Out 5/10 4/10   Subjective Functional Status/Changes: Patient reports he is not great today. He had to walk a lot.He thought about getting the WC to get around. THE EKG was \"good\"; he has to wait for the blood work. He is still waiting for the clearance from his primary. He has to have more help coming down stairs. He wants to get rails put in. He has to fix his toilet but cannot get down on his knees.      TREATMENT AREA =  Pain in right knee [M25.561]  Pain in left knee [M25.562]    OBJECTIVE    Modalities Rationale:     decrease pain and increase tissue extensibility to improve patient's ability to progress to PLOF and address remaining functional goals.     min [] Estim Unattended, type/location:                                      []  w/ice    []  w/heat    min [] Estim Attended, type/location:                                     []  w/US     []  w/ice    []  w/heat    []  TENS insruct      min []  Mechanical Traction: type/lbs                   []  pro   []  sup   []  int   []  cont    []  before manual    []  after manual    min []  Ultrasound, settings/location:     15 (during exercises) min  unbill [x]  Ice     [x]  Heat    location/position: Ice to left knee; heat to right knee    min []  Paraffin,  details:     min []  Vasopneumatic Device, press/temp:     min []  Whirlpool / Fluido:    If using vaso (only need to measure limb vaso being performed on)      pre-treatment girth :       post-treatment girth :       measured at (landmark location) :      min []  Other:    Skin assessment post-treatment:   Intact      Therapeutic Procedures:    Tx Min Billable or 1:1 Min (if diff from Tx

## 2024-02-01 LAB
BACTERIA SPEC CULT: NORMAL
BACTERIA SPEC CULT: NORMAL
SERVICE CMNT-IMP: NORMAL

## 2024-02-09 ENCOUNTER — HOSPITAL ENCOUNTER (OUTPATIENT)
Facility: HOSPITAL | Age: 69
Setting detail: RECURRING SERIES
Discharge: HOME | End: 2024-02-12
Payer: MEDICARE

## 2024-02-09 ENCOUNTER — ANESTHESIA EVENT (OUTPATIENT)
Facility: HOSPITAL | Age: 69
End: 2024-02-09
Payer: MEDICARE

## 2024-02-09 PROCEDURE — 97535 SELF CARE MNGMENT TRAINING: CPT

## 2024-02-09 PROCEDURE — 97140 MANUAL THERAPY 1/> REGIONS: CPT

## 2024-02-09 NOTE — PROGRESS NOTES
Extension 5 5   Ankle Plantarflexion         Dorsiflexion 5 5      5. Patient will improve right knee flex AROM by at leat 10 deg in order to perform daily tasks with increased ease.              Status at evaluation: left 98 deg, right 81 deg              Current: right 82 degrees before manual; 90 degrees post manual      Next PN due 2/21/24/ RC due 2/26/24       PLAN  - Continue Plan of Care  - Upgrade activities as tolerated    Laura M Behrens, PTA    2/9/2024    8:09 AM    Future Appointments   Date Time Provider Department Center   2/9/2024  1:20 PM Behrens, Laura M, PTA [facility]   2/14/2024  3:20 PM Behrens, Laura M, PTA [facility]   2/16/2024 11:20 AM Behrens, Laura M, PTA [facility]   4/15/2024  2:40 PM Aminta Pena MD [facility]   1/15/2025  1:00 PM [facility] NURSE [facility] Sched   1/22/2025  2:40 PM Reyes, Charlene N, PA [facility] Sched

## 2024-02-14 ENCOUNTER — HOSPITAL ENCOUNTER (OUTPATIENT)
Facility: HOSPITAL | Age: 69
Setting detail: RECURRING SERIES
Discharge: HOME | End: 2024-02-17
Payer: MEDICARE

## 2024-02-14 PROCEDURE — 97530 THERAPEUTIC ACTIVITIES: CPT

## 2024-02-14 PROCEDURE — 97110 THERAPEUTIC EXERCISES: CPT

## 2024-02-14 PROCEDURE — 97140 MANUAL THERAPY 1/> REGIONS: CPT

## 2024-02-14 NOTE — PROGRESS NOTES
PHYSICAL / OCCUPATIONAL THERAPY - DAILY TREATMENT NOTE    Patient Name: Kalen Rivas    Date: 2024    : 1955  Insurance: Payor: MEDICARE / Plan: MEDICARE PART A AND B / Product Type: *No Product type* /      Patient  verified yes   Visit #   Current / Total 11 16   Time   In / Out 3:22 4:33   Pain   In / Out 4-5/10 right knee; 3/10 left knee 3-4/10 right knee; 2/10 left knee   Subjective Functional Status/Changes: Pt reports changing his walk and how he goes up the stairs due to increasing right knee pain. He states sleeping is hard at night and any bit of trying to get up after sitting.      TREATMENT AREA =  Pain in right knee [M25.561]  Pain in left knee [M25.562]    OBJECTIVE    Modalities Rationale:     decrease pain and increase tissue extensibility to improve patient's ability to progress to PLOF and address remaining functional goals.     min [] Estim Unattended, type/location:                                      []  w/ice    []  w/heat    min [] Estim Attended, type/location:                                     []  w/US     []  w/ice    []  w/heat    []  TENS insruct      min []  Mechanical Traction: type/lbs                   []  pro   []  sup   []  int   []  cont    []  before manual    []  after manual    min []  Ultrasound, settings/location:     10 min  unbill [x]  Ice     [x]  Heat    location/position: Ice to left knee; heat to right knee    min []  Paraffin,  details:     min []  Vasopneumatic Device, press/temp:     min []  Whirlpool / Fluido:    If using vaso (only need to measure limb vaso being performed on)      pre-treatment girth :       post-treatment girth :       measured at (landmark location) :      min []  Other:    Skin assessment post-treatment:   Intact      Therapeutic Procedures:    Tx Min Billable or 1:1 Min (if diff from Tx Min) Procedure, Rationale, Specifics   30 [id number] Therapeutic Activity (timed):  use of dynamic activities replicating functional movements

## 2024-02-16 ENCOUNTER — HOSPITAL ENCOUNTER (OUTPATIENT)
Facility: HOSPITAL | Age: 69
Setting detail: RECURRING SERIES
Discharge: HOME | End: 2024-02-19
Payer: MEDICARE

## 2024-02-16 PROCEDURE — 97110 THERAPEUTIC EXERCISES: CPT

## 2024-02-16 PROCEDURE — 97140 MANUAL THERAPY 1/> REGIONS: CPT

## 2024-02-16 NOTE — PROGRESS NOTES
PHYSICAL / OCCUPATIONAL THERAPY - DAILY TREATMENT NOTE    Patient Name: Kalen Rivas    Date: 2024    : 1955  Insurance: Payor: MEDICARE / Plan: MEDICARE PART A AND B / Product Type: *No Product type* /      Patient  verified yes   Visit #   Current / Total 12 16   Time   In / Out 1:24 12:20   Pain   In / Out 4/10 3/10   Subjective Functional Status/Changes: Pt reports his right knee is feeling a little better today but the pain fluctuates. He is going to try to get the ankle weights and cushion for height in his chair to help with standing up. He notes discomfort down into his lower leg on the right when trying to bend it.      TREATMENT AREA =  Pain in right knee [M25.561]  Pain in left knee [M25.562]    OBJECTIVE    Modalities Rationale:     decrease pain and increase tissue extensibility to improve patient's ability to progress to PLOF and address remaining functional goals.     min [] Estim Unattended, type/location:                                      []  w/ice    []  w/heat    min [] Estim Attended, type/location:                                     []  w/US     []  w/ice    []  w/heat    []  TENS insruct      min []  Mechanical Traction: type/lbs                   []  pro   []  sup   []  int   []  cont    []  before manual    []  after manual    min []  Ultrasound, settings/location:     10 min  unbill [x]  Ice     [x]  Heat    location/position: Ice to left knee; heat to right knee    min []  Paraffin,  details:     min []  Vasopneumatic Device, press/temp:     min []  Whirlpool / Fluido:    If using vaso (only need to measure limb vaso being performed on)      pre-treatment girth :       post-treatment girth :       measured at (landmark location) :      min []  Other:    Skin assessment post-treatment:   Intact      Therapeutic Procedures:    Tx Min Billable or 1:1 Min (if diff from Tx Min) Procedure, Rationale, Specifics   47 63300 Therapeutic Exercise (timed):  increase ROM,

## 2024-02-22 ENCOUNTER — HOSPITAL ENCOUNTER (OUTPATIENT)
Facility: HOSPITAL | Age: 69
Setting detail: RECURRING SERIES
Discharge: HOME | End: 2024-02-25
Payer: MEDICARE

## 2024-02-22 PROCEDURE — 97535 SELF CARE MNGMENT TRAINING: CPT

## 2024-02-22 PROCEDURE — 97530 THERAPEUTIC ACTIVITIES: CPT

## 2024-02-22 PROCEDURE — 97110 THERAPEUTIC EXERCISES: CPT

## 2024-02-22 NOTE — PROGRESS NOTES
Physical Therapy Discharge Instructions      [facility]  [address] [place] [address] [phone] fax    Patient: Kalen Rivas  : 1955      Continue Home Exercise Program working on range of motion daily until surgery      Continue with    [x] Ice  Heat prior to surgery; ice post op     [x] Heat           Follow up with MD:     [x] Upon completion of therapy     [] As needed      Recommendations:     [x]   Return to activity with home program    []   Return to activity with the following modifications:       []Post Rehab Program    []Join Independent aquatic program     []Return to/join local gym        Additional Comments: Good luck with your surgery! We will see you soon!

## 2024-02-22 NOTE — PROGRESS NOTES
[facility] - [facility]  [address] [place] [address]  [phone] fax    Physical Therapy Discharge Summary    Patient name: Kalen Rivas Start of Care: 2023    Referral source: Mateo Headley PA-C : 1955   Medical/Treatment Diagnosis: Pain in right knee [M25.561]  Pain in left knee [M25.562]  Payor: MEDICARE / Plan: MEDICARE PART A AND B / Product Type: *No Product type* /  Onset Date:Chronic; exacerbated May 2023     Prior Hospitalization: see medical history Provider#: [id number]   Comorbidities: Arthritis, history of left TKA () with revision (), history of B RTC repair, neck and back pain; history of 2 back surgeries, HTN, PVD, history of blood clots   Prior Level of Function:Lives in 1 story home with 3 steps to enter with spouse; retired; enjoys gardening, hiking, camping, and fishing; helps care for 2-year-old granddaughter     Visits from Start of Care: 13    Missed Visits: 2    Reporting Period : 2024 to 2024    Summary of Care:  Long Term Goals: To be accomplished in 8 weeks  1. Patient will improve FOTO score to at least 51/100 points in order to demonstrate functional improvement.  Status at evaluation: 29/100  Status at last progress note: 33/100  Status at discharge: not met 38/100     2. Patient will report no falls since start of care in order to demonstrate improve safety in home and community.  Status at evaluation: last fall in 2023  Status at discharge: met no falls    3. Patient will report standing/ambulation tolerance of at least 1 hour in order to perform household management and recreational tasks with increased ease.  Status at evaluation: 30 min  Status at last progress note: 3-4 minutes standing; 15-30 minutes walking  Status at discharge: not met 15-20 minutes walking with SPC; 8-10 minutes standing     4. Patient will improve right LE strength to at least 4-/5 with MMT in order to ambulate and

## 2024-02-22 NOTE — PROGRESS NOTES
PHYSICAL / OCCUPATIONAL THERAPY - DAILY TREATMENT NOTE    Patient Name: Kalen Rivas    Date: 2024    : 1955  Insurance: Payor: MEDICARE / Plan: MEDICARE PART A AND B / Product Type: *No Product type* /      Patient  verified yes   Visit #   Current / Total 13 16   Time   In / Out 2:40 3:20   Pain   In / Out 3/10 right 3/10   Subjective Functional Status/Changes: Pt reports he got the ankle weights and actually gets a little temporary relief with distraction. He is going to look into getting an elevated cushion for his chair to help with sit to stands so they aren't so painful. He has his surgery on .      TREATMENT AREA =  Pain in right knee [M25.561]  Pain in left knee [M25.562]    OBJECTIVE    Therapeutic Procedures:    Tx Min Billable or 1:1 Min (if diff from Tx Min) Procedure, Rationale, Specifics   10  09417 Therapeutic Exercise (timed):  increase ROM, strength, coordination, balance, and proprioception to improve patient's ability to progress to PLOF and address remaining functional goals. (see flow sheet as applicable)     Details if applicable:  see flow sheet   20  64733 Therapeutic Activity (timed):  use of dynamic activities replicating functional movements to increase ROM, strength, coordination, balance, and proprioception in order to improve patient's ability to progress to PLOF and address remaining functional goals.  (see flow sheet as applicable)     Details if applicable:  goal reassessment   10  43977 Self Care/Home Management (timed):  improve patient knowledge and understanding of pain reducing techniques, positioning, posture/ergonomics, home safety, activity modification, diagnosis/prognosis, and physical therapy expectations, procedures and progression  to improve patient's ability to progress to PLOF and address remaining functional goals.  (see flow sheet as applicable)      Details if applicable:  chair cushion from [employer]; discharge plan; progression back to OP

## 2024-02-26 PROBLEM — M17.11 OSTEOARTHRITIS OF RIGHT KNEE: Chronic | Status: ACTIVE | Noted: 2024-02-26

## 2024-02-26 RX ORDER — SODIUM CHLORIDE 0.9 % (FLUSH) 0.9 %
5-40 SYRINGE (ML) INJECTION PRN
Status: CANCELLED | OUTPATIENT
Start: 2024-02-26

## 2024-02-26 RX ORDER — ACETAMINOPHEN 325 MG/1
650 TABLET ORAL EVERY 6 HOURS
Status: CANCELLED | OUTPATIENT
Start: 2024-02-26

## 2024-02-26 RX ORDER — SODIUM CHLORIDE 9 MG/ML
INJECTION, SOLUTION INTRAVENOUS CONTINUOUS
Status: CANCELLED | OUTPATIENT
Start: 2024-02-26 | End: 2024-02-26

## 2024-02-26 RX ORDER — OXYCODONE HYDROCHLORIDE 5 MG/1
10 TABLET ORAL EVERY 4 HOURS PRN
Status: CANCELLED | OUTPATIENT
Start: 2024-02-26

## 2024-02-26 RX ORDER — DIPHENHYDRAMINE HCL 25 MG
25 CAPSULE ORAL EVERY 6 HOURS PRN
Status: CANCELLED | OUTPATIENT
Start: 2024-02-26

## 2024-02-26 RX ORDER — SODIUM CHLORIDE 9 MG/ML
INJECTION, SOLUTION INTRAVENOUS CONTINUOUS
Status: CANCELLED | OUTPATIENT
Start: 2024-02-26

## 2024-02-26 RX ORDER — DIPHENHYDRAMINE HYDROCHLORIDE 50 MG/ML
25 INJECTION INTRAMUSCULAR; INTRAVENOUS EVERY 6 HOURS PRN
Status: CANCELLED | OUTPATIENT
Start: 2024-02-26

## 2024-02-26 RX ORDER — ONDANSETRON 2 MG/ML
4 INJECTION INTRAMUSCULAR; INTRAVENOUS EVERY 6 HOURS PRN
Status: CANCELLED | OUTPATIENT
Start: 2024-02-26

## 2024-02-26 RX ORDER — OXYCODONE HYDROCHLORIDE 5 MG/1
5 TABLET ORAL EVERY 4 HOURS PRN
Status: CANCELLED | OUTPATIENT
Start: 2024-02-26

## 2024-02-26 NOTE — H&P
Allergen Reactions    Amoxicillin-Pot Clavulanate Itching    Chlorhexidine Gluconate Itching    Lactose Diarrhea     Abd cramps    Milk (Cow) Diarrhea    Nsaids Other (See Comments)     On blood thinner, contraindicated.     Ropinirole Hcl Itching and Nausea Only    Simvastatin Other (See Comments)     \"I don't remember\"    Penicillins Itching and Rash    Ropinirole Nausea And Vomiting        Review of Systems:  A comprehensive review of systems was negative except for that written in the History of Present Illness.    Objective:       Physical Exam:  HEENT: Normocephalic, atraumatic  Lungs:  Clear to auscultation  Heart:   Regular rate and rhythm  Abdomen: Soft  Extremities:  Pain with range of motion of the right knee. Active ROM limited due to pain.   Tenderness generalized. Crepitus present. Antalgic gait.         Assessment:      Arthritis of the right knee.    Plan:       Proceed with scheduled RIGHT TOTAL KNEE ARTHROPLASTY.    The various methods of treatment have been discussed with the patient and family. After consideration of risks, benefits, and other options for treatment, the patient has consented to surgical interventions. Questions were answered and preoperative teaching was done by Dr Jhonatan Ortiz.     Signed By: GEOVANNY Pinto     February 26, 2024

## 2024-02-27 ENCOUNTER — ANESTHESIA (OUTPATIENT)
Facility: HOSPITAL | Age: 69
End: 2024-02-27
Payer: MEDICARE

## 2024-02-27 ENCOUNTER — APPOINTMENT (OUTPATIENT)
Facility: HOSPITAL | Age: 69
End: 2024-02-27
Attending: ORTHOPAEDIC SURGERY
Payer: MEDICARE

## 2024-02-27 ENCOUNTER — HOSPITAL ENCOUNTER (OUTPATIENT)
Facility: HOSPITAL | Age: 69
Setting detail: OUTPATIENT SURGERY
Discharge: HOME HEALTH | End: 2024-02-27
Attending: ORTHOPAEDIC SURGERY | Admitting: ORTHOPAEDIC SURGERY
Payer: MEDICARE

## 2024-02-27 VITALS
RESPIRATION RATE: 16 BRPM | DIASTOLIC BLOOD PRESSURE: 70 MMHG | WEIGHT: 237.4 LBS | SYSTOLIC BLOOD PRESSURE: 125 MMHG | BODY MASS INDEX: 33.99 KG/M2 | TEMPERATURE: 98 F | HEART RATE: 80 BPM | OXYGEN SATURATION: 97 % | HEIGHT: 70 IN

## 2024-02-27 DIAGNOSIS — M17.11 PRIMARY OSTEOARTHRITIS OF RIGHT KNEE: Primary | Chronic | ICD-10-CM

## 2024-02-27 LAB
ABO + RH BLD: NORMAL
BLOOD GROUP ANTIBODIES SERPL: NORMAL
GLUCOSE BLD STRIP.AUTO-MCNC: 99 MG/DL (ref 70–110)
SPECIMEN EXP DATE BLD: NORMAL

## 2024-02-27 PROCEDURE — 86901 BLOOD TYPING SEROLOGIC RH(D): CPT

## 2024-02-27 PROCEDURE — 2580000003 HC RX 258: Performed by: ORTHOPAEDIC SURGERY

## 2024-02-27 PROCEDURE — 7100000011 HC PHASE II RECOVERY - ADDTL 15 MIN: Performed by: ORTHOPAEDIC SURGERY

## 2024-02-27 PROCEDURE — 6370000000 HC RX 637 (ALT 250 FOR IP): Performed by: PHYSICIAN ASSISTANT

## 2024-02-27 PROCEDURE — 7100000001 HC PACU RECOVERY - ADDTL 15 MIN: Performed by: ORTHOPAEDIC SURGERY

## 2024-02-27 PROCEDURE — 64447 NJX AA&/STRD FEMORAL NRV IMG: CPT | Performed by: ANESTHESIOLOGY

## 2024-02-27 PROCEDURE — 97535 SELF CARE MNGMENT TRAINING: CPT

## 2024-02-27 PROCEDURE — 2500000003 HC RX 250 WO HCPCS: Performed by: PHYSICIAN ASSISTANT

## 2024-02-27 PROCEDURE — 6360000002 HC RX W HCPCS: Performed by: PHYSICIAN ASSISTANT

## 2024-02-27 PROCEDURE — 86900 BLOOD TYPING SEROLOGIC ABO: CPT

## 2024-02-27 PROCEDURE — 2500000003 HC RX 250 WO HCPCS: Performed by: ORTHOPAEDIC SURGERY

## 2024-02-27 PROCEDURE — A4217 STERILE WATER/SALINE, 500 ML: HCPCS | Performed by: ORTHOPAEDIC SURGERY

## 2024-02-27 PROCEDURE — 6360000002 HC RX W HCPCS: Performed by: ORTHOPAEDIC SURGERY

## 2024-02-27 PROCEDURE — 97116 GAIT TRAINING THERAPY: CPT

## 2024-02-27 PROCEDURE — 2500000003 HC RX 250 WO HCPCS: Performed by: ANESTHESIOLOGY

## 2024-02-27 PROCEDURE — 3700000000 HC ANESTHESIA ATTENDED CARE: Performed by: ORTHOPAEDIC SURGERY

## 2024-02-27 PROCEDURE — 6360000002 HC RX W HCPCS: Performed by: REGISTERED NURSE

## 2024-02-27 PROCEDURE — C1713 ANCHOR/SCREW BN/BN,TIS/BN: HCPCS | Performed by: ORTHOPAEDIC SURGERY

## 2024-02-27 PROCEDURE — 2709999900 HC NON-CHARGEABLE SUPPLY: Performed by: ORTHOPAEDIC SURGERY

## 2024-02-27 PROCEDURE — 2500000003 HC RX 250 WO HCPCS: Performed by: REGISTERED NURSE

## 2024-02-27 PROCEDURE — 97161 PT EVAL LOW COMPLEX 20 MIN: CPT

## 2024-02-27 PROCEDURE — 86850 RBC ANTIBODY SCREEN: CPT

## 2024-02-27 PROCEDURE — C1776 JOINT DEVICE (IMPLANTABLE): HCPCS | Performed by: ORTHOPAEDIC SURGERY

## 2024-02-27 PROCEDURE — 3600000005 HC SURGERY LEVEL 5 BASE: Performed by: ORTHOPAEDIC SURGERY

## 2024-02-27 PROCEDURE — 6360000002 HC RX W HCPCS: Performed by: ANESTHESIOLOGY

## 2024-02-27 PROCEDURE — 73560 X-RAY EXAM OF KNEE 1 OR 2: CPT

## 2024-02-27 PROCEDURE — 3700000001 HC ADD 15 MINUTES (ANESTHESIA): Performed by: ORTHOPAEDIC SURGERY

## 2024-02-27 PROCEDURE — 82962 GLUCOSE BLOOD TEST: CPT

## 2024-02-27 PROCEDURE — 3600000015 HC SURGERY LEVEL 5 ADDTL 15MIN: Performed by: ORTHOPAEDIC SURGERY

## 2024-02-27 PROCEDURE — 6370000000 HC RX 637 (ALT 250 FOR IP): Performed by: ANESTHESIOLOGY

## 2024-02-27 PROCEDURE — 7100000010 HC PHASE II RECOVERY - FIRST 15 MIN: Performed by: ORTHOPAEDIC SURGERY

## 2024-02-27 PROCEDURE — 97165 OT EVAL LOW COMPLEX 30 MIN: CPT

## 2024-02-27 PROCEDURE — 7100000000 HC PACU RECOVERY - FIRST 15 MIN: Performed by: ORTHOPAEDIC SURGERY

## 2024-02-27 DEVICE — RT SIZE 4 FEMORAL CR NONPOROUS
Type: IMPLANTABLE DEVICE | Site: KNEE | Status: FUNCTIONAL
Brand: BKS TRIMAX

## 2024-02-27 DEVICE — SIZE 5 TIBIAL TRAY NONPOROUS
Type: IMPLANTABLE DEVICE | Site: KNEE | Status: FUNCTIONAL
Brand: BALANCED KNEE SYSTEM

## 2024-02-27 DEVICE — SIZE 5 8MM TIBIAL INSERT CR
Type: IMPLANTABLE DEVICE | Site: KNEE | Status: FUNCTIONAL
Brand: BKS E-VITALIZE

## 2024-02-27 DEVICE — CEMENT BONE 40GM HI VISC PALACOS R: Type: IMPLANTABLE DEVICE | Site: KNEE | Status: FUNCTIONAL

## 2024-02-27 DEVICE — 35MM PATELLA, VITAMIN E
Type: IMPLANTABLE DEVICE | Site: KNEE | Status: FUNCTIONAL
Brand: BKS E-VITALIZE

## 2024-02-27 RX ORDER — DEXAMETHASONE SODIUM PHOSPHATE 4 MG/ML
8 INJECTION, SOLUTION INTRA-ARTICULAR; INTRALESIONAL; INTRAMUSCULAR; INTRAVENOUS; SOFT TISSUE ONCE
Status: COMPLETED | OUTPATIENT
Start: 2024-02-27 | End: 2024-02-27

## 2024-02-27 RX ORDER — LORAZEPAM 2 MG/ML
0.5 INJECTION INTRAMUSCULAR ONCE
Status: DISCONTINUED | OUTPATIENT
Start: 2024-02-27 | End: 2024-02-27 | Stop reason: HOSPADM

## 2024-02-27 RX ORDER — DEXAMETHASONE SODIUM PHOSPHATE 4 MG/ML
8 INJECTION, SOLUTION INTRA-ARTICULAR; INTRALESIONAL; INTRAMUSCULAR; INTRAVENOUS; SOFT TISSUE ONCE
Status: DISCONTINUED | OUTPATIENT
Start: 2024-02-27 | End: 2024-02-27

## 2024-02-27 RX ORDER — GLYCOPYRROLATE 0.2 MG/ML
INJECTION INTRAMUSCULAR; INTRAVENOUS PRN
Status: DISCONTINUED | OUTPATIENT
Start: 2024-02-27 | End: 2024-02-27 | Stop reason: SDUPTHER

## 2024-02-27 RX ORDER — KETAMINE HCL IN NACL, ISO-OSM 100MG/10ML
SYRINGE (ML) INJECTION PRN
Status: DISCONTINUED | OUTPATIENT
Start: 2024-02-27 | End: 2024-02-27 | Stop reason: SDUPTHER

## 2024-02-27 RX ORDER — DEXMEDETOMIDINE HYDROCHLORIDE 100 UG/ML
INJECTION, SOLUTION INTRAVENOUS
Status: COMPLETED
Start: 2024-02-27 | End: 2024-02-27

## 2024-02-27 RX ORDER — OXYCODONE HYDROCHLORIDE 5 MG/1
5 TABLET ORAL PRN
Status: COMPLETED | OUTPATIENT
Start: 2024-02-27 | End: 2024-02-27

## 2024-02-27 RX ORDER — SODIUM CHLORIDE, SODIUM LACTATE, POTASSIUM CHLORIDE, AND CALCIUM CHLORIDE .6; .31; .03; .02 G/100ML; G/100ML; G/100ML; G/100ML
1000 INJECTION, SOLUTION INTRAVENOUS ONCE
Status: DISCONTINUED | OUTPATIENT
Start: 2024-02-27 | End: 2024-02-27 | Stop reason: HOSPADM

## 2024-02-27 RX ORDER — TRANEXAMIC ACID 10 MG/ML
1000 INJECTION, SOLUTION INTRAVENOUS ONCE
Status: COMPLETED | OUTPATIENT
Start: 2024-02-27 | End: 2024-02-27

## 2024-02-27 RX ORDER — DEXMEDETOMIDINE HYDROCHLORIDE 100 UG/ML
INJECTION, SOLUTION INTRAVENOUS
Status: COMPLETED | OUTPATIENT
Start: 2024-02-27 | End: 2024-02-27

## 2024-02-27 RX ORDER — DIPHENHYDRAMINE HYDROCHLORIDE 50 MG/ML
12.5 INJECTION INTRAMUSCULAR; INTRAVENOUS
Status: DISCONTINUED | OUTPATIENT
Start: 2024-02-27 | End: 2024-02-27 | Stop reason: HOSPADM

## 2024-02-27 RX ORDER — DEXAMETHASONE SODIUM PHOSPHATE 4 MG/ML
4 INJECTION, SOLUTION INTRA-ARTICULAR; INTRALESIONAL; INTRAMUSCULAR; INTRAVENOUS; SOFT TISSUE ONCE
Status: COMPLETED | OUTPATIENT
Start: 2024-02-27 | End: 2024-02-27

## 2024-02-27 RX ORDER — HYDROMORPHONE HYDROCHLORIDE 1 MG/ML
0.5 INJECTION, SOLUTION INTRAMUSCULAR; INTRAVENOUS; SUBCUTANEOUS EVERY 5 MIN PRN
Status: DISCONTINUED | OUTPATIENT
Start: 2024-02-27 | End: 2024-02-27 | Stop reason: HOSPADM

## 2024-02-27 RX ORDER — GABAPENTIN 100 MG/1
100 CAPSULE ORAL AS NEEDED
COMMUNITY

## 2024-02-27 RX ORDER — LIDOCAINE HYDROCHLORIDE 20 MG/ML
INJECTION, SOLUTION EPIDURAL; INFILTRATION; INTRACAUDAL; PERINEURAL PRN
Status: DISCONTINUED | OUTPATIENT
Start: 2024-02-27 | End: 2024-02-27 | Stop reason: SDUPTHER

## 2024-02-27 RX ORDER — ROPIVACAINE HYDROCHLORIDE 5 MG/ML
INJECTION, SOLUTION EPIDURAL; INFILTRATION; PERINEURAL
Status: COMPLETED | OUTPATIENT
Start: 2024-02-27 | End: 2024-02-27

## 2024-02-27 RX ORDER — CEFADROXIL 500 MG/1
500 CAPSULE ORAL 2 TIMES DAILY
Qty: 10 CAPSULE | Refills: 0 | Status: SHIPPED | OUTPATIENT
Start: 2024-02-27 | End: 2024-03-03

## 2024-02-27 RX ORDER — FENTANYL CITRATE 50 UG/ML
25 INJECTION, SOLUTION INTRAMUSCULAR; INTRAVENOUS EVERY 5 MIN PRN
Status: DISCONTINUED | OUTPATIENT
Start: 2024-02-27 | End: 2024-02-27 | Stop reason: HOSPADM

## 2024-02-27 RX ORDER — SODIUM CHLORIDE, SODIUM LACTATE, POTASSIUM CHLORIDE, CALCIUM CHLORIDE 600; 310; 30; 20 MG/100ML; MG/100ML; MG/100ML; MG/100ML
INJECTION, SOLUTION INTRAVENOUS CONTINUOUS
Status: DISCONTINUED | OUTPATIENT
Start: 2024-02-27 | End: 2024-02-27 | Stop reason: HOSPADM

## 2024-02-27 RX ORDER — OXYCODONE HYDROCHLORIDE 5 MG/1
5 TABLET ORAL EVERY 4 HOURS PRN
Qty: 42 TABLET | Refills: 0 | Status: SHIPPED | OUTPATIENT
Start: 2024-02-27 | End: 2024-03-05

## 2024-02-27 RX ORDER — DEXAMETHASONE SODIUM PHOSPHATE 10 MG/ML
INJECTION, SOLUTION INTRAMUSCULAR; INTRAVENOUS
Status: COMPLETED | OUTPATIENT
Start: 2024-02-27 | End: 2024-02-27

## 2024-02-27 RX ORDER — MIDAZOLAM HYDROCHLORIDE 1 MG/ML
INJECTION INTRAMUSCULAR; INTRAVENOUS
Status: COMPLETED
Start: 2024-02-27 | End: 2024-02-27

## 2024-02-27 RX ORDER — PROPOFOL 10 MG/ML
INJECTION, EMULSION INTRAVENOUS PRN
Status: DISCONTINUED | OUTPATIENT
Start: 2024-02-27 | End: 2024-02-27 | Stop reason: SDUPTHER

## 2024-02-27 RX ORDER — DEXAMETHASONE SODIUM PHOSPHATE 10 MG/ML
INJECTION, SOLUTION INTRAMUSCULAR; INTRAVENOUS
Status: COMPLETED
Start: 2024-02-27 | End: 2024-02-27

## 2024-02-27 RX ORDER — ONDANSETRON 2 MG/ML
INJECTION INTRAMUSCULAR; INTRAVENOUS PRN
Status: DISCONTINUED | OUTPATIENT
Start: 2024-02-27 | End: 2024-02-27 | Stop reason: SDUPTHER

## 2024-02-27 RX ORDER — MEPERIDINE HYDROCHLORIDE 50 MG/ML
12.5 INJECTION INTRAMUSCULAR; INTRAVENOUS; SUBCUTANEOUS ONCE
Status: DISCONTINUED | OUTPATIENT
Start: 2024-02-27 | End: 2024-02-27 | Stop reason: HOSPADM

## 2024-02-27 RX ORDER — DROPERIDOL 2.5 MG/ML
0.62 INJECTION, SOLUTION INTRAMUSCULAR; INTRAVENOUS
Status: DISCONTINUED | OUTPATIENT
Start: 2024-02-27 | End: 2024-02-27 | Stop reason: HOSPADM

## 2024-02-27 RX ORDER — ACETAMINOPHEN 500 MG
1000 TABLET ORAL ONCE
Status: DISCONTINUED | OUTPATIENT
Start: 2024-02-27 | End: 2024-02-27 | Stop reason: SDUPTHER

## 2024-02-27 RX ORDER — ACETAMINOPHEN 500 MG
1000 TABLET ORAL ONCE
Status: COMPLETED | OUTPATIENT
Start: 2024-02-27 | End: 2024-02-27

## 2024-02-27 RX ORDER — FENTANYL CITRATE 50 UG/ML
INJECTION, SOLUTION INTRAMUSCULAR; INTRAVENOUS PRN
Status: DISCONTINUED | OUTPATIENT
Start: 2024-02-27 | End: 2024-02-27 | Stop reason: SDUPTHER

## 2024-02-27 RX ORDER — HYDRALAZINE HYDROCHLORIDE 20 MG/ML
10 INJECTION INTRAMUSCULAR; INTRAVENOUS
Status: DISCONTINUED | OUTPATIENT
Start: 2024-02-27 | End: 2024-02-27 | Stop reason: HOSPADM

## 2024-02-27 RX ORDER — LABETALOL HYDROCHLORIDE 5 MG/ML
10 INJECTION, SOLUTION INTRAVENOUS
Status: DISCONTINUED | OUTPATIENT
Start: 2024-02-27 | End: 2024-02-27 | Stop reason: HOSPADM

## 2024-02-27 RX ORDER — SODIUM CHLORIDE 0.9 % (FLUSH) 0.9 %
5-40 SYRINGE (ML) INJECTION EVERY 12 HOURS SCHEDULED
Status: DISCONTINUED | OUTPATIENT
Start: 2024-02-27 | End: 2024-02-27 | Stop reason: HOSPADM

## 2024-02-27 RX ORDER — ONDANSETRON 2 MG/ML
4 INJECTION INTRAMUSCULAR; INTRAVENOUS
Status: DISCONTINUED | OUTPATIENT
Start: 2024-02-27 | End: 2024-02-27 | Stop reason: HOSPADM

## 2024-02-27 RX ORDER — MIDAZOLAM HYDROCHLORIDE 1 MG/ML
INJECTION INTRAMUSCULAR; INTRAVENOUS
Status: COMPLETED | OUTPATIENT
Start: 2024-02-27 | End: 2024-02-27

## 2024-02-27 RX ORDER — SODIUM CHLORIDE 0.9 % (FLUSH) 0.9 %
5-40 SYRINGE (ML) INJECTION PRN
Status: DISCONTINUED | OUTPATIENT
Start: 2024-02-27 | End: 2024-02-27 | Stop reason: HOSPADM

## 2024-02-27 RX ORDER — METHYLPREDNISOLONE ACETATE 80 MG/ML
INJECTION, SUSPENSION INTRA-ARTICULAR; INTRALESIONAL; INTRAMUSCULAR; SOFT TISSUE PRN
Status: DISCONTINUED | OUTPATIENT
Start: 2024-02-27 | End: 2024-02-27 | Stop reason: ALTCHOICE

## 2024-02-27 RX ORDER — OXYCODONE HYDROCHLORIDE 5 MG/1
10 TABLET ORAL PRN
Status: COMPLETED | OUTPATIENT
Start: 2024-02-27 | End: 2024-02-27

## 2024-02-27 RX ORDER — EPHEDRINE SULFATE/0.9% NACL/PF 50 MG/5 ML
SYRINGE (ML) INTRAVENOUS PRN
Status: DISCONTINUED | OUTPATIENT
Start: 2024-02-27 | End: 2024-02-27 | Stop reason: SDUPTHER

## 2024-02-27 RX ORDER — SODIUM CHLORIDE 9 MG/ML
INJECTION, SOLUTION INTRAVENOUS PRN
Status: DISCONTINUED | OUTPATIENT
Start: 2024-02-27 | End: 2024-02-27 | Stop reason: HOSPADM

## 2024-02-27 RX ORDER — PANTOPRAZOLE SODIUM 40 MG/1
40 TABLET, DELAYED RELEASE ORAL ONCE
Status: COMPLETED | OUTPATIENT
Start: 2024-02-27 | End: 2024-02-27

## 2024-02-27 RX ADMIN — PROPOFOL 200 MG: 10 INJECTION, EMULSION INTRAVENOUS at 07:34

## 2024-02-27 RX ADMIN — DEXAMETHASONE SODIUM PHOSPHATE 4 MG: 4 INJECTION, SOLUTION INTRAMUSCULAR; INTRAVENOUS at 06:44

## 2024-02-27 RX ADMIN — GLYCOPYRROLATE 0.2 MG: 0.2 INJECTION INTRAMUSCULAR; INTRAVENOUS at 07:41

## 2024-02-27 RX ADMIN — TRANEXAMIC ACID 1000 MG: 10 INJECTION, SOLUTION INTRAVENOUS at 08:30

## 2024-02-27 RX ADMIN — FENTANYL CITRATE 25 MCG: 50 INJECTION, SOLUTION INTRAMUSCULAR; INTRAVENOUS at 07:33

## 2024-02-27 RX ADMIN — FENTANYL CITRATE 25 MCG: 50 INJECTION INTRAMUSCULAR; INTRAVENOUS at 09:30

## 2024-02-27 RX ADMIN — Medication 30 MG: at 07:34

## 2024-02-27 RX ADMIN — TRANEXAMIC ACID 1000 MG: 10 INJECTION, SOLUTION INTRAVENOUS at 07:37

## 2024-02-27 RX ADMIN — SODIUM CHLORIDE, SODIUM LACTATE, POTASSIUM CHLORIDE, AND CALCIUM CHLORIDE: 600; 310; 30; 20 INJECTION, SOLUTION INTRAVENOUS at 08:12

## 2024-02-27 RX ADMIN — HYDROMORPHONE HYDROCHLORIDE 0.5 MG: 1 INJECTION, SOLUTION INTRAMUSCULAR; INTRAVENOUS; SUBCUTANEOUS at 08:44

## 2024-02-27 RX ADMIN — Medication 10 MG: at 08:25

## 2024-02-27 RX ADMIN — SODIUM CHLORIDE, SODIUM LACTATE, POTASSIUM CHLORIDE, AND CALCIUM CHLORIDE 1000 ML: .6; .31; .03; .02 INJECTION, SOLUTION INTRAVENOUS at 06:17

## 2024-02-27 RX ADMIN — DEXMEDETOMIDINE HYDROCHLORIDE 0.5 ML: 100 INJECTION, SOLUTION INTRAVENOUS at 07:14

## 2024-02-27 RX ADMIN — LIDOCAINE HYDROCHLORIDE 100 MG: 20 INJECTION, SOLUTION EPIDURAL; INFILTRATION; INTRACAUDAL; PERINEURAL at 07:31

## 2024-02-27 RX ADMIN — ONDANSETRON HYDROCHLORIDE 4 MG: 2 INJECTION INTRAMUSCULAR; INTRAVENOUS at 07:47

## 2024-02-27 RX ADMIN — DEXAMETHASONE SODIUM PHOSPHATE 4 MG: 10 INJECTION, SOLUTION INTRAMUSCULAR; INTRAVENOUS at 07:14

## 2024-02-27 RX ADMIN — HYDROMORPHONE HYDROCHLORIDE 0.5 MG: 1 INJECTION, SOLUTION INTRAMUSCULAR; INTRAVENOUS; SUBCUTANEOUS at 09:00

## 2024-02-27 RX ADMIN — WATER 2000 MG: 1 INJECTION INTRAMUSCULAR; INTRAVENOUS; SUBCUTANEOUS at 07:37

## 2024-02-27 RX ADMIN — DEXAMETHASONE SODIUM PHOSPHATE 8 MG: 4 INJECTION INTRA-ARTICULAR; INTRALESIONAL; INTRAMUSCULAR; INTRAVENOUS; SOFT TISSUE at 09:11

## 2024-02-27 RX ADMIN — ROPIVACAINE HYDROCHLORIDE 20 ML: 5 INJECTION EPIDURAL; INFILTRATION; PERINEURAL at 07:14

## 2024-02-27 RX ADMIN — OXYCODONE HYDROCHLORIDE 5 MG: 5 TABLET ORAL at 10:39

## 2024-02-27 RX ADMIN — MIDAZOLAM 2 MG: 1 INJECTION INTRAMUSCULAR; INTRAVENOUS at 07:14

## 2024-02-27 RX ADMIN — HYDROMORPHONE HYDROCHLORIDE 0.5 MG: 1 INJECTION, SOLUTION INTRAMUSCULAR; INTRAVENOUS; SUBCUTANEOUS at 09:15

## 2024-02-27 RX ADMIN — SODIUM CHLORIDE, SODIUM LACTATE, POTASSIUM CHLORIDE, AND CALCIUM CHLORIDE: 600; 310; 30; 20 INJECTION, SOLUTION INTRAVENOUS at 06:17

## 2024-02-27 RX ADMIN — ACETAMINOPHEN 1000 MG: 500 TABLET ORAL at 06:44

## 2024-02-27 RX ADMIN — HYDROMORPHONE HYDROCHLORIDE 0.5 MG: 1 INJECTION, SOLUTION INTRAMUSCULAR; INTRAVENOUS; SUBCUTANEOUS at 09:20

## 2024-02-27 RX ADMIN — FENTANYL CITRATE 25 MCG: 50 INJECTION INTRAMUSCULAR; INTRAVENOUS at 09:25

## 2024-02-27 RX ADMIN — PANTOPRAZOLE SODIUM 40 MG: 40 TABLET, DELAYED RELEASE ORAL at 06:44

## 2024-02-27 RX ADMIN — Medication 20 MG: at 07:57

## 2024-02-27 RX ADMIN — FENTANYL CITRATE 25 MCG: 50 INJECTION, SOLUTION INTRAMUSCULAR; INTRAVENOUS at 07:30

## 2024-02-27 ASSESSMENT — HOOS JR
HOOS JR TOTAL INTERVAL SCORE: 36.363
WALKING ON UNEVEN SURFACE: 3
SITTING: 4
GOING UP OR DOWN STAIRS: 3
HOOS JR RAW SCORE: 17
RISING FROM SITTING: 2
LYING IN BED (TURNING OVER, MAINTAINING HIP POSITION): 2
BENDING TO THE FLOOR TO PICK UP OBJECT: 3
HOOS JR RAW SCORE: 17

## 2024-02-27 ASSESSMENT — PAIN DESCRIPTION - PAIN TYPE
TYPE: SURGICAL PAIN

## 2024-02-27 ASSESSMENT — PROMIS GLOBAL HEALTH SCALE
IN GENERAL, HOW WOULD YOU RATE YOUR SATISFACTION WITH YOUR SOCIAL ACTIVITIES AND RELATIONSHIPS [ON A SCALE OF 1 (POOR) TO 5 (EXCELLENT)]?: 3
IN THE PAST 7 DAYS, HOW OFTEN HAVE YOU BEEN BOTHERED BY EMOTIONAL PROBLEMS, SUCH AS FEELING ANXIOUS, DEPRESSED, OR IRRITABLE [ON A SCALE FROM 1 (NEVER) TO 5 (ALWAYS)]?: 3
IN GENERAL, WOULD YOU SAY YOUR HEALTH IS...[ON A SCALE OF 1 (POOR) TO 5 (EXCELLENT)]: 3
IN GENERAL, WOULD YOU SAY YOUR QUALITY OF LIFE IS...[ON A SCALE OF 1 (POOR) TO 5 (EXCELLENT)]: 2
IN THE PAST 7 DAYS, HOW WOULD YOU RATE YOUR FATIGUE ON AVERAGE [ON A SCALE FROM 1 (NONE) TO 5 (VERY SEVERE)]?: 3
IN GENERAL, HOW WOULD YOU RATE YOUR MENTAL HEALTH, INCLUDING YOUR MOOD AND YOUR ABILITY TO THINK [ON A SCALE OF 1 (POOR) TO 5 (EXCELLENT)]?: 3
SUM OF RESPONSES TO QUESTIONS 3, 6, 7, & 8: 10
WHO IS THE PERSON COMPLETING THE PROMIS V1.1 SURVEY?: 0
HOW IS THE PROMIS V1.1 BEING ADMINISTERED?: 0
IN THE PAST 7 DAYS, HOW WOULD YOU RATE YOUR PAIN ON AVERAGE [ON A SCALE FROM 0 (NO PAIN) TO 10 (WORST IMAGINABLE PAIN)]?: 4
IN GENERAL, PLEASE RATE HOW WELL YOU CARRY OUT YOUR USUAL SOCIAL ACTIVITIES (INCLUDES ACTIVITIES AT HOME, AT WORK, AND IN YOUR COMMUNITY, AND RESPONSIBILITIES AS A PARENT, CHILD, SPOUSE, EMPLOYEE, FRIEND, ETC) [ON A SCALE OF 1 (POOR) TO 5 (EXCELLENT)]?: 3
IN GENERAL, HOW WOULD YOU RATE YOUR PHYSICAL HEALTH [ON A SCALE OF 1 (POOR) TO 5 (EXCELLENT)]?: 2
TO WHAT EXTENT ARE YOU ABLE TO CARRY OUT YOUR EVERYDAY PHYSICAL ACTIVITIES SUCH AS WALKING, CLIMBING STAIRS, CARRYING GROCERIES, OR MOVING A CHAIR [ON A SCALE OF 1 (NOT AT ALL) TO 5 (COMPLETELY)]?: 1
SUM OF RESPONSES TO QUESTIONS 2, 4, 5, & 10: 11

## 2024-02-27 ASSESSMENT — PAIN DESCRIPTION - LOCATION
LOCATION: KNEE

## 2024-02-27 ASSESSMENT — PAIN SCALES - GENERAL
PAINLEVEL_OUTOF10: 0
PAINLEVEL_OUTOF10: 0
PAINLEVEL_OUTOF10: 4
PAINLEVEL_OUTOF10: 6
PAINLEVEL_OUTOF10: 7
PAINLEVEL_OUTOF10: 6
PAINLEVEL_OUTOF10: 4
PAINLEVEL_OUTOF10: 0
PAINLEVEL_OUTOF10: 4
PAINLEVEL_OUTOF10: 7

## 2024-02-27 ASSESSMENT — PAIN DESCRIPTION - DESCRIPTORS
DESCRIPTORS: ACHING

## 2024-02-27 ASSESSMENT — PAIN DESCRIPTION - ORIENTATION
ORIENTATION: RIGHT

## 2024-02-27 ASSESSMENT — PAIN - FUNCTIONAL ASSESSMENT
PAIN_FUNCTIONAL_ASSESSMENT: PREVENTS OR INTERFERES WITH ALL ACTIVE AND SOME PASSIVE ACTIVITIES
PAIN_FUNCTIONAL_ASSESSMENT: PREVENTS OR INTERFERES WITH ALL ACTIVE AND SOME PASSIVE ACTIVITIES
PAIN_FUNCTIONAL_ASSESSMENT: 0-10
PAIN_FUNCTIONAL_ASSESSMENT: PREVENTS OR INTERFERES WITH ALL ACTIVE AND SOME PASSIVE ACTIVITIES
PAIN_FUNCTIONAL_ASSESSMENT: PREVENTS OR INTERFERES WITH ALL ACTIVE AND SOME PASSIVE ACTIVITIES

## 2024-02-27 ASSESSMENT — PAIN DESCRIPTION - FREQUENCY
FREQUENCY: CONTINUOUS

## 2024-02-27 NOTE — ANESTHESIA PRE PROCEDURE
Department of Anesthesiology  Preprocedure Note       Name:  Kalen Rivas   Age:  68 y.o.  :  1955                                          MRN:  [id number]         Date:  2024      Surgeon: Surgeon(s):  Jhonatan Ortiz MD    Procedure: Procedure(s):  RIGHT TOTAL KNEE REPLACEMENT    Medications prior to admission:   Prior to Admission medications    Medication Sig Start Date End Date Taking? Authorizing Provider   gabapentin (NEURONTIN) 100 MG capsule Take 1 capsule by mouth as needed.   Yes Ya Solorzano MD   Probiotic Product (PROBIOTIC DAILY) CAPS Take 1 capsule by mouth Daily with supper    Ya Solorzano MD   metaxalone (SKELAXIN) 800 MG tablet Take 1 tablet by mouth in the morning and at bedtime 12/13/23 6/10/24  Aminta Pena MD   apixaban (ELIQUIS) 5 MG TABS tablet Take 1 tablet by mouth 2 times daily 23   Ya Solorzano MD   Cholecalciferol (VITAMIN D3) 125 MCG (5000 UT) TABS Take 1 tablet by mouth daily    Ya Solorzano MD   zinc 50 MG TABS tablet Take 1 tablet by mouth daily    Ya Solorzano MD   diclofenac sodium (VOLTAREN) 1 % GEL Apply 4 g topically 4 times daily Dispense 5, 100g tubes. 6/15/23   Mateo Headley PA-C   ezetimibe (ZETIA) 10 MG tablet Take 1 tablet by mouth daily 23   Ya Solorzano MD   gabapentin (NEURONTIN) 300 MG capsule Take 1 capsule by mouth in the morning and at bedtime. 3/1/23   Ya Solorzano MD   acetaminophen (TYLENOL) 500 MG tablet Take 2 tablets by mouth every 6 hours as needed    Automatic Reconciliation, Ar   ALPRAZolam (XANAX) 0.5 MG tablet Take 1 tablet by mouth nightly as needed. 19   Automatic Reconciliation, Ar   butalbital-APAP-caffeine -40 MG CAPS per capsule Take 1 capsule by mouth every 6 hours as needed 19   Automatic Reconciliation, Ar   labetalol (NORMODYNE) 100 MG tablet Take 1 tablet by mouth 2 times daily 19   Automatic Reconciliation, Ar   lansoprazole

## 2024-02-27 NOTE — ANESTHESIA PROCEDURE NOTES
Peripheral Block    Patient location during procedure: pre-op  Reason for block: at surgeon's request  Start time: 2/27/2024 7:14 AM  End time: 2/27/2024 7:16 AM  Staffing  Performed: anesthesiologist   Anesthesiologist: Alex Vo MD  Performed by: Alex Vo MD  Authorized by: Alex Vo MD    Preanesthetic Checklist  Completed: patient identified, IV checked, site marked, risks and benefits discussed, surgical/procedural consents, equipment checked, pre-op evaluation, timeout performed, anesthesia consent given, oxygen available and monitors applied/VS acknowledged  Peripheral Block   Patient position: sitting  Prep: alcohol swabs  Provider prep: mask  Patient monitoring: cardiac monitor, continuous pulse ox, IV access, oxygen, responsive to questions and frequent blood pressure checks  Block type: Saphenous  Laterality: right  Injection technique: single-shot  Guidance: ultrasound guided    Needle   Needle type: insulated echogenic nerve stimulator needle   Needle gauge: 22 G  Needle localization: ultrasound guidance  Assessment   Injection assessment: negative aspiration for heme, no paresthesia on injection and local visualized surrounding nerve on ultrasound  Paresthesia pain: none  Slow fractionated injection: yes  Hemodynamics: stable  Outcomes: uncomplicated and patient tolerated procedure well    Additional Notes  Ultrasound was used to visualize nerves and local anesthetic spread.  Medications Administered  midazolam (VERSED) injection 2 mg/2mL - IntraVENous   2 mg - 2/27/2024 7:14:00 AM  dexmedeTOMIDine (PRECEDEX) injection 200 mcg/2 mL - Perineural   0.5 mL - 2/27/2024 7:14:00 AM  dexAMETHasone (DECADRON) (PF) 10 mg/mL injection - Other   4 mg - 2/27/2024 7:14:00 AM  ropivacaine (NAROPIN) injection 0.5% - Perineural   20 mL - 2/27/2024 7:14:00 AM

## 2024-02-27 NOTE — INTERVAL H&P NOTE
Update History & Physical    The patient's History and Physical of February 26, 2024 was reviewed with the patient and I examined the patient. There was no change. The surgical site was confirmed by the patient and me.     Plan: The risks, benefits, expected outcome, and alternative to the recommended procedure have been discussed with the patient. Patient understands and wants to proceed with the procedure.     Electronically signed by NATALIO SANDHU MD on 2/27/2024 at 7:08 AM

## 2024-02-27 NOTE — PERIOP NOTE
Pt. Used restroom in pre-op area with assistance.   Patient placed on Dick Paws for a minimum of 30 min in  Preop.

## 2024-02-27 NOTE — DISCHARGE INSTRUCTIONS
dry.  You may have a bandage over the cut (incision). A bandage helps the incision heal and protects it. Your doctor will tell you how to take care of this. Keep it clean and dry. You may have drainage from the wound.     Call your doctor now or seek immediate medical care if:    You have signs of an infection such as:  Increased pain, swelling, warmth, or redness in the area.  Red streaks leading from the area.  Pus draining from the wound.  A new or higher fever.       Bleeding After Surgery: Care Instructions  Overview  After surgery, it is common to have some minor bruising or bleeding from the cut (incision) made by your doctor. But problems may occur that cause you to bleed too much in the surgery area.  An injury to a blood vessel can cause bleeding after surgery. Other causes include medicines such as aspirin or anticoagulants (blood thinners).  To help stop the bleeding, your doctor may have put pressure on the incision or sewn up or cauterized (sealed) the incision. Or you may have had surgery to stop bleeding inside the surgery area. Your doctor also may have given you medicines that help stop the bleeding.  How can you care for yourself at home?  If you have strips of tape on the incision, leave the tape on until it falls off. Or follow your doctor's instructions for removing the tape. Keep the area dry at all times.  You will have a dressing over the incision. A dressing helps the incision heal and protects it. Your doctor will tell you how to take care of this.  If you do not have tape on the incision, wash the area daily with warm, soapy water, and pat it dry. Don't use hydrogen peroxide or alcohol, which can slow healing.    When should you call for help?    Call your doctor now or seek immediate medical care if:    You are dizzy or lightheaded, or you feel like you may faint.     You have bleeding that starts again or gets worse, such as soaking one or more bandages over 2 to 4 hours, even after

## 2024-02-27 NOTE — ANESTHESIA POSTPROCEDURE EVALUATION
Post-Anesthesia Evaluation and Assessment    Cardiovascular Function/Vital Signs  Visit Vitals  /63   Pulse 80   Temp 97.5 °F (36.4 °C)   Resp 14   Ht 1.778 m (5' 10\")   Wt 107.7 kg (237 lb 6.4 oz)   SpO2 96%   BMI 34.06 kg/m²       Patient is status post Procedure(s):  RIGHT TOTAL KNEE REPLACEMENT.    Nausea/Vomiting: Controlled.    Postoperative hydration reviewed and adequate.    Pain:      Managed.    Neurological Status:       At baseline.    Mental Status and Level of Consciousness: Arousable.    Pulmonary Status:       Adequate oxygenation and airway patent.    Complications related to anesthesia: None    Post-anesthesia assessment completed. No concerns.    Patient has met all discharge requirements.    Signed By: Alex Vo MD    February 27, 2024

## 2024-02-27 NOTE — PROGRESS NOTES
Occupational Therapy Goals:  Initiated 2/27/2024 to be met within 7-10 days.  Short Term Goals  Time Frame for Short Term Goals: 7 days  Short Term Goal 1: The patient will demonstrate ability to complete LB dressing tasks at supervision level with use of AE as needed.  Short Term Goal 2: The patient will demonstrate ability to complete LB bathing tasks at supervision level with use of AE as needed.  Short Term Goal 3: The patient will demonstrate ability to complete toilet transfers at supervision level.  Short Term Goal 4: The patient will demonstrate ability to complete toileting tasks at supervision level.  Short Term Goal 5: The patient will demonstrate ability to complete grooming tasks standing at sink at supervision level.    OCCUPATIONAL THERAPY EVALUATION    Patient: Kalen Rivas (68 y.o. male)  Date: 2/27/2024  Primary Diagnosis: Osteoarthritis of right knee, unspecified osteoarthritis type [M17.11]  Procedure(s) (LRB):  RIGHT TOTAL KNEE REPLACEMENT (Right) Day of Surgery   Precautions: Weight Bearing, Fall Risk, Right Lower Extremity Weight Bearing: Weight Bearing As Tolerated,  ,  ,  ,  ,  ,    PLOF: Pt was independent in ADLs    ASSESSMENT : Pt cleared for therapy evaluation by RN. Upon therapist's arrival, pt was seated in recliner. Pt was agreeable to occupational therapy evaluation. Pt seen with PT for second set of skilled hands. OT educated pt regarding the role of occupational therapy. Pt verbalized 100% understanding. OT educated pt regarding knee precautions and weight bearing status s/p R TKA. OT educated pt regarding  the intended wear schedule for compression stockings s/p surgery. OT educated pt regarding use of AE and use of adaptive ADL strategies to improve ease and safety with ADL tasks. Pt verbalized 100% understanding. Pt completed UB dressing tasks with supervision and LB dressing tasks with min A (without AD). Pt ambulated to bathroom with CGA and use of 2WW  (vc's provided for 
Complexity: Decision Making: Low Complexity

## 2024-02-27 NOTE — PERIOP NOTE
TRANSFER - OUT REPORT:    Verbal report given to YAZMIN Estrella on Kalen Rivas  being transferred to [facility] for routine post-op       Report consisted of patient's Situation, Background, Assessment and   Recommendations(SBAR).     Information from the following report(s) Adult Overview, Surgery Report, Intake/Output, and MAR was reviewed with the receiving nurse.           Lines:   Peripheral IV 02/27/24 Proximal;Left Forearm (Active)   Site Assessment Clean, dry & intact 02/27/24 0910   Line Status Infusing 02/27/24 0910   Line Care Connections checked and tightened 02/27/24 0910   Phlebitis Assessment No symptoms 02/27/24 0910   Infiltration Assessment 0 02/27/24 0910   Alcohol Cap Used No 02/27/24 0910   Dressing Status Clean, dry & intact 02/27/24 0910   Dressing Type Transparent 02/27/24 0910   Dressing Intervention New 02/27/24 0616        Opportunity for questions and clarification was provided.      Patient transported with:  O2 @ 2lpm and Tech

## 2024-02-27 NOTE — DISCHARGE SUMMARY
tablet           * This list has 2 medication(s) that are the same as other medications prescribed for you. Read the directions carefully, and ask your doctor or other care provider to review them with you.                STOP taking these medications      diclofenac sodium 1 % Gel  Commonly known as: VOLTAREN               Where to Get Your Medications        These medications were sent to [id number] - [place], [place] - [address] - P [phone] - F [phone] [address] [address]      Phone: [phone]   apixaban 2.5 MG Tabs tablet  cefadroxil 500 MG capsule  oxyCODONE 5 MG immediate release tablet           The patient is to continue at home with home physical therapy 3 times a week to work on gait training, range of motion, strengthening, and weightbearing exercises as tolerated on his right lower extremity.The patient is to progress from a walker to a cane to complete total weightbearing as tolerable. The patient is to continue to keep his incision dry.  The patient is to followup with Dr. Jhonatan Ortiz, Rachel Holcomb PA-C and/or Micheal Kahn PA-C in the office approximately 10-14 days status post for x-rays and further evaluation.    GEOVANNY Pinto  2/27/2024

## 2024-02-27 NOTE — OP NOTE
Note      Patient: Kalen Rivas  YOB: 1955  MRN: [id number]    Date of Procedure: 2/27/2024    Pre-Op Diagnosis Codes:     * Osteoarthritis of right knee, unspecified osteoarthritis type [M17.11]    Post-Op Diagnosis: Same       Procedure(s):  RIGHT TOTAL KNEE REPLACEMENT    Surgeon(s):  Natalio Sandhu MD    Assistant:   Surgical Assistant: Brielle Oscar  Physician Assistant: Rachel Holcomb PA    Anesthesia: General    Estimated Blood Loss (mL): less than 100     Complications: None    Specimens:   * No specimens in log *    Implants:  Implant Name Type Inv. Item Serial No.  Lot No. LRB No. Used Action   CEMENT BONE 40GM HI VISC PALACOS R - [id number]  CEMENT BONE 40GM HI VISC PALACOS R  [employer] [id number] Right 2 Implanted   TRAY TIB SZ 5 NP A BAL KNEE - [id number]  TRAY TIB SZ 5 NP A BAL KNEE  [employer] [id number] Right 1 Implanted   INSERT TIB SZ 5 THK8MM CRUCE RET BKS E VITALIZE - [id number]  INSERT TIB SZ 5 THK8MM CRUCE RET BKS E VITALIZE  [employer] [id number] Right 1 Implanted   COMPONENT PATELLAR 35 MM KNEE VITAMIN-E - [id number]  COMPONENT PATELLAR 35 MM KNEE VITAMIN-E  [employer] [id number] Right 1 Implanted   COMPONENT FEM SZ 4 R NP CRUCE RET BKS TRIMAX - [id number]  COMPONENT FEM SZ 4 R NP CRUCE RET BKS TRIMAX  [employer] [id number] Right 1 Implanted         Drains: * No LDAs found *    Findings: djd right knee and effusion left knee        Detailed Description of Procedure:   See above note    Electronically signed by NATALIO SANDHU MD on 2/27/2024 at 10:57 AM

## 2024-03-15 ENCOUNTER — HOSPITAL ENCOUNTER (OUTPATIENT)
Facility: HOSPITAL | Age: 69
Setting detail: RECURRING SERIES
Discharge: HOME | End: 2024-03-18
Payer: MEDICARE

## 2024-03-15 PROCEDURE — 97110 THERAPEUTIC EXERCISES: CPT

## 2024-03-15 PROCEDURE — 97161 PT EVAL LOW COMPLEX 20 MIN: CPT

## 2024-03-15 NOTE — PROGRESS NOTES
[facility]  [address] [place] [address] Ph:[phone] Fx: [phone]  Plan of Care / Statement of Necessity for Physical Therapy Services     Patient Name: Kalen Rivas : 1955   Medical   Diagnosis: Pain in right knee [M25.561] Treatment Diagnosis: M25.561  RIGHT KNEE PAIN       Onset Date: 24 Payor :  Payor: MEDICARE / Plan: MEDICARE PART A AND B / Product Type: *No Product type* /    Referral Source: Jhonatan Ortiz MD Start of Care (SOC): 3/15/2024   Prior Hospitalization: See medical history Provider #: [id number]   Prior Level of Function:  Lives in 1 story home with 3 steps to enter with spouse; retired; enjoys gardening, hiking, camping, and fishing; helps care for 2-year-old granddaughter     Comorbidities:  Arthritis, history of left TKA () with revision (), history of B RTC repair, neck and back pain; history of 2 back surgeries, HTN, PVD, history of blood clots       Assessment / key information:    Pt. Is a 68 year old male s/p right TKA on 24. He reports having home health after his hospital stay and has been using bike at home consistently. He reports using walker at home and in community currently. He reports no difficulty with bed mobility. He presents with decreased right knee AROM 6-75 degrees with pain at end ranges. He does have redness along anterior knee but he reports it is lessening and his physician was aware. Left knee extension strength was limited to 4/5 and hip abduction strength was 3/5. He has patella hypomobility and decreased flexibility as well. TUG test was 21 seconds. Skilled PT is medically necessary in order to improve right knee mobility and strength for increased ease of ambulation and improved quality of life.     Evaluation Complexity:  History:  HIGH Complexity :3+ comorbidities / personal factors will impact the outcome/ POC ; Examination:  MEDIUM Complexity : 3 
untimed codes, in totals to left)     [x]  Patient Education billed concurrently with other procedures       Physical Therapy Evaluation - Knee    Gait:  [] Normal    [x] Abnormal    [] Antalgic    [] NWB    Device: RW    Describe: decreased weight shift to right side, decreased step length    ROM / Strength  [] Unable to assess                  AROM                      PROM                   Strength (1-5)    Left Right Left Right Left Right   Hip Flexion     4 4    Extension          Abduction     3     Adduction         Knee Flexion 98 75   4+ 4+    Extension 0 6   4 4   Ankle Plantarflexion          Dorsiflexion     4+ 4+       Flexibility: [] Unable to assess at this time  Hamstrings:    (L) Tightness= [] WNL   [] Min   [] Mod   [] Severe    (R) Tightness= [] WNL   [x] Min   [] Mod   [] Severe  Quadriceps:    (L) Tightness= [] WNL   [] Min   [] Mod   [] Severe    (R) Tightness= [] WNL   [] Min   [] Mod   [] Severe  Gastroc:      (L) Tightness= [] WNL   [] Min   [] Mod   [] Severe    (R) Tightness= [] WNL   [] Min   [] Mod   [] Severe  Other:  Patellar Mobility   []L []R Hypermobile []L [x]R Hypomobile         Other tests/comments:  TUG test: 21 seconds    Pain Level (0-10 scale) post treatment: 3-4/10       [x]  See Plan of Care for goals and reassessment       PLAN  []  Upgrade activities as tolerated     [x]  Continue plan of care  []  Update interventions per flow sheet       []  Other:_      Abdelrahman Cobos, PT 3/15/2024  7:09 AM

## 2024-03-20 ENCOUNTER — HOSPITAL ENCOUNTER (OUTPATIENT)
Facility: HOSPITAL | Age: 69
Setting detail: RECURRING SERIES
Discharge: HOME | End: 2024-03-23
Payer: MEDICARE

## 2024-03-20 ENCOUNTER — TELEPHONE (OUTPATIENT)
Age: 69
End: 2024-03-20

## 2024-03-20 DIAGNOSIS — M79.18 MYOFASCIAL PAIN: ICD-10-CM

## 2024-03-20 PROCEDURE — 97535 SELF CARE MNGMENT TRAINING: CPT

## 2024-03-20 PROCEDURE — 97110 THERAPEUTIC EXERCISES: CPT

## 2024-03-20 PROCEDURE — 97140 MANUAL THERAPY 1/> REGIONS: CPT

## 2024-03-20 PROCEDURE — 97016 VASOPNEUMATIC DEVICE THERAPY: CPT

## 2024-03-20 RX ORDER — METAXALONE 800 MG/1
800 TABLET ORAL 2 TIMES DAILY
Qty: 60 TABLET | Refills: 5 | Status: CANCELLED | OUTPATIENT
Start: 2024-03-20 | End: 2024-09-16

## 2024-03-20 NOTE — TELEPHONE ENCOUNTER
I just spoke with the pharmacist; someone in the office already submitted a prior authorization that was denied.     Also, the patient has a new insurance card Medicare Part D, no longer [employer].    Please get the patient to come to [facility] office to SCAN all updated insurance cards so a brand new prior authorization can be started so the patient can get his medication.

## 2024-03-20 NOTE — PROGRESS NOTES
PHYSICAL / OCCUPATIONAL THERAPY - DAILY TREATMENT NOTE    Patient Name: Kalen Rivas    Date: 3/20/2024    : 1955  Insurance: Payor: MEDICARE / Plan: MEDICARE PART A AND B / Product Type: *No Product type* /      Patient  verified Yes     Visit #   Current / Total 2 36   Time   In / Out 11:20 12:15   Pain   In / Out 4/10 3/10   Subjective Functional Status/Changes: Pt reports pain down the front of his shin/ankle and has been working hard on his exercises. He hasn't been icing like he should. He is trying to walk more with the cane.      TREATMENT AREA =  Pain in right knee [M25.561]    OBJECTIVE    Modalities Rationale:     decrease inflammation and decrease pain to improve patient's ability to progress to PLOF and address remaining functional goals.     min [] Estim Unattended, type/location:                                      []  w/ice    []  w/heat    min [] Estim Attended, type/location:                                     []  w/US     []  w/ice    []  w/heat    []  TENS insruct      min []  Mechanical Traction: type/lbs                   []  pro   []  sup   []  int   []  cont    []  before manual    []  after manual    min []  Ultrasound, settings/location:     During vaso min  unbill [x]  Ice     []  Heat    location/position: Right anterior shin    min []  Paraffin,  details:    15 min [x]  Vasopneumatic Device, press/temp: Low/low to right knee    min []  Whirlpool / Fluido:    If using vaso (only need to measure limb vaso being performed on)      pre-treatment girth : 47.5 cm      post-treatment girth : 46 cm      measured at (landmark location) :  mid patella    min []  Other:    Skin assessment post-treatment:   Intact      Vasopnuematic compression justification:  Per bilateral girth measures taken and listed above the edema is considered significant and having an impact on the patient's strength, balance, gait, transfers, self care, and ADL's     Therapeutic Procedures:    Tx Min

## 2024-03-20 NOTE — TELEPHONE ENCOUNTER
Patient states he was told prior auth is needed before he can  his rx for Metaxalone 800 mgs. Patient uses the [facility] pharmacy on [address]. , and can be reached at [phone].

## 2024-03-21 NOTE — TELEPHONE ENCOUNTER
Patient verbalized understanding he needs to bring updated insurance cards to the office so we can complete a new Prior Authorization for the medication.     Subscriber ID has changed w/ Medicare Part D added.

## 2024-03-22 ENCOUNTER — HOSPITAL ENCOUNTER (OUTPATIENT)
Facility: HOSPITAL | Age: 69
Setting detail: RECURRING SERIES
Discharge: HOME | End: 2024-03-25
Payer: MEDICARE

## 2024-03-22 PROCEDURE — 97535 SELF CARE MNGMENT TRAINING: CPT

## 2024-03-22 PROCEDURE — 97016 VASOPNEUMATIC DEVICE THERAPY: CPT

## 2024-03-22 PROCEDURE — 97110 THERAPEUTIC EXERCISES: CPT

## 2024-03-22 PROCEDURE — 97140 MANUAL THERAPY 1/> REGIONS: CPT

## 2024-03-22 NOTE — TELEPHONE ENCOUNTER
Patient called today to let us know that he went to the office yesterday and updated his insurance cards so that the Prior Auth can now be done.

## 2024-03-22 NOTE — PROGRESS NOTES
PHYSICAL / OCCUPATIONAL THERAPY - DAILY TREATMENT NOTE    Patient Name: Kalen Rivas    Date: 3/22/2024    : 1955  Insurance: Payor: MEDICARE / Plan: MEDICARE PART A AND B / Product Type: *No Product type* /      Patient  verified yes   Visit #   Current / Total 3 36   Time   In / Out 12:42P 1:53P   Pain   In / Out 4/10 3/10   Subjective Functional Status/Changes: Patient reports pain is better today than it was yesterday but he is a little stiffer. They came to take the bike away; he feels it helped the leg get stronger. He had more pain on Wednesday but felt good after Tuesday's session. It feels about the same as it did Tuesday but the bend is better. He still has burning to the inner part of his knee and has pain to the outside. It took about 2 min to get his leg going last time. His granddaughter is doing well. He will have a second granddaughter coming in . He really only uses the walker to get out of the house and when he goes for his short walks; last visit was the first day he did got out of the house and did not use the walker.     TREATMENT AREA =  Pain in right knee [M25.561]    OBJECTIVE    Modalities Rationale:     decrease edema, decrease inflammation, and decrease pain to improve patient's ability to progress to PLOF and address remaining functional goals.     min [] Estim Unattended, type/location:                                      []  w/ice    []  w/heat    min [] Estim Attended, type/location:                                     []  w/US     []  w/ice    []  w/heat    []  TENS insruct      min []  Mechanical Traction: type/lbs                   []  pro   []  sup   []  int   []  cont    []  before manual    []  after manual    min []  Ultrasound, settings/location:      min  unbill []  Ice     []  Heat    location/position:     min []  Paraffin,  details:    18 with setup min [x]  Vasopneumatic Device, press/temp: Low/low    min []  Whirlpool / Fluido:    If using vaso (only need

## 2024-03-26 ENCOUNTER — TELEPHONE (OUTPATIENT)
Age: 69
End: 2024-03-26

## 2024-03-26 ENCOUNTER — HOSPITAL ENCOUNTER (OUTPATIENT)
Facility: HOSPITAL | Age: 69
Setting detail: RECURRING SERIES
Discharge: HOME | End: 2024-03-29
Payer: MEDICARE

## 2024-03-26 PROCEDURE — 97116 GAIT TRAINING THERAPY: CPT

## 2024-03-26 PROCEDURE — 97016 VASOPNEUMATIC DEVICE THERAPY: CPT

## 2024-03-26 PROCEDURE — 97140 MANUAL THERAPY 1/> REGIONS: CPT

## 2024-03-26 PROCEDURE — 97110 THERAPEUTIC EXERCISES: CPT

## 2024-03-26 NOTE — TELEPHONE ENCOUNTER
Patient called again in regards to the Metaxlone 800 mg tablet medication from .    Patient said that he had problems getting the medication before. Patient would like to know if the Prior Auth was done , that he had brought by his Medicare insurance to the office.     [facility] pharmacy tel. [phone]    Patient tel. [phone].    Note : please see previous messages.

## 2024-03-26 NOTE — PROGRESS NOTES
PHYSICAL / OCCUPATIONAL THERAPY - DAILY TREATMENT NOTE    Patient Name: Kalen Rivas    Date: 3/26/2024    : 1955  Insurance: Payor: MEDICARE / Plan: MEDICARE PART A AND B / Product Type: *No Product type* /      Patient  verified yes   Visit #   Current / Total 4 36   Time   In / Out 3:20 4:15   Pain   In / Out 4/10 3/10   Subjective Functional Status/Changes: Pt reports noticing less swelling and less use of the RW more use of the cane. He has been getting more right sciatic pain so isn't sure if his previous shot in the back is wearing off. States the scar is healing well.      TREATMENT AREA =  Pain in right knee [M25.561]    OBJECTIVE    Modalities Rationale:     decrease edema, decrease inflammation, and decrease pain to improve patient's ability to progress to PLOF and address remaining functional goals.     min [] Estim Unattended, type/location:                                      []  w/ice    []  w/heat    min [] Estim Attended, type/location:                                     []  w/US     []  w/ice    []  w/heat    []  TENS insruct      min []  Mechanical Traction: type/lbs                   []  pro   []  sup   []  int   []  cont    []  before manual    []  after manual    min []  Ultrasound, settings/location:      min  unbill []  Ice     []  Heat    location/position:     min []  Paraffin,  details:    15 with setup min [x]  Vasopneumatic Device, press/temp: Low/low    min []  Whirlpool / Fluido:    If using vaso (only need to measure limb vaso being performed on)      pre-treatment girth : 44.5 cm      post-treatment girth : 44 cm      measured at (landmark location) :  mid patella    min []  Other:    Skin assessment post-treatment:   Redness, no adverse reactions      Vasopnuematic compression justification:  Per bilateral girth measures taken and listed above the edema is considered significant and having an impact on the patient's strength, balance, gait, transfers, self care, and

## 2024-03-28 ENCOUNTER — HOSPITAL ENCOUNTER (OUTPATIENT)
Facility: HOSPITAL | Age: 69
Setting detail: RECURRING SERIES
Discharge: HOME | End: 2024-03-31
Payer: MEDICARE

## 2024-03-28 DIAGNOSIS — M47.816 LUMBAR FACET ARTHROPATHY: ICD-10-CM

## 2024-03-28 DIAGNOSIS — M48.061 SPINAL STENOSIS, LUMBAR REGION WITHOUT NEUROGENIC CLAUDICATION: ICD-10-CM

## 2024-03-28 DIAGNOSIS — M54.16 LUMBAR RADICULITIS: Primary | ICD-10-CM

## 2024-03-28 DIAGNOSIS — M53.3 SACROILIAC JOINT PAIN: ICD-10-CM

## 2024-03-28 DIAGNOSIS — M54.41 ACUTE MIDLINE LOW BACK PAIN WITH RIGHT-SIDED SCIATICA: ICD-10-CM

## 2024-03-28 DIAGNOSIS — M48.062 SPINAL STENOSIS OF LUMBAR REGION WITH NEUROGENIC CLAUDICATION: ICD-10-CM

## 2024-03-28 PROCEDURE — 97530 THERAPEUTIC ACTIVITIES: CPT

## 2024-03-28 PROCEDURE — 97110 THERAPEUTIC EXERCISES: CPT

## 2024-03-28 PROCEDURE — 97016 VASOPNEUMATIC DEVICE THERAPY: CPT

## 2024-03-28 PROCEDURE — 97140 MANUAL THERAPY 1/> REGIONS: CPT

## 2024-03-28 RX ORDER — METHYLPREDNISOLONE 4 MG/1
TABLET ORAL
Qty: 1 KIT | Refills: 0 | Status: SHIPPED | OUTPATIENT
Start: 2024-03-28 | End: 2024-04-03

## 2024-03-28 RX ORDER — METHOCARBAMOL 500 MG/1
500 TABLET, FILM COATED ORAL 2 TIMES DAILY PRN
Qty: 60 TABLET | Refills: 1 | Status: SHIPPED | OUTPATIENT
Start: 2024-03-28 | End: 2024-05-27

## 2024-03-28 NOTE — TELEPHONE ENCOUNTER
Patient returned our call and confirmed that we are billing insurance for this medication.  Please contact insurance to initiate a second prior authorization for patient to fill   metaxalone (SKELAXIN) 800 MG tablet at [facility] pharmacy at [facility].    Patient says he's in significant pain due to therapy and a recent surgery so if there's any way we can prescribe a steroid aleisha he would appreciate it.

## 2024-03-28 NOTE — PROGRESS NOTES
evaluation/last progress note: ext: 4/5 flex: 4+/5     4.   Goal: Patient will improve TUG test to 12 seconds in order to increase ease of ambulation at home.   Status at evaluation/last progress note: 21 seconds      Next PN/ RC due 04/13/2024  Auth due (visit number/ date) RACHEAL       PLAN  - Continue Plan of Care  - Upgrade activities as tolerated    Kelly Whalen PTA    3/28/2024    9:35 AM    Future Appointments   Date Time Provider Department Center   3/28/2024  3:20 PM Kelly Whalen, PTA [facility]   4/3/2024  8:40 AM Abdelrahman Cobos, PT [facility]   4/5/2024  8:40 AM Behrens, Laura M, PTA [facility]   4/9/2024  2:40 PM Behrens, Laura M, PTA [facility]   4/12/2024  9:20 AM Behrens, Laura M, PTA [facility]   4/15/2024  2:40 PM Aminta Pena MD [facility]   4/17/2024  9:20 AM Behrens, Laura M, PTA [facility]   4/19/2024  9:20 AM Behrens, Laura M, PTA [facility]   4/24/2024  9:20 AM Behrens, Laura M, PTA [facility]   4/26/2024  9:20 AM Abdelrahman Cobos, PT [facility]   5/1/2024  9:20 AM Behrens, Laura M, PTA [facility]   5/3/2024  9:20 AM Behrens, Laura M, PTA [facility]   5/8/2024  9:20 AM Abdelrahman Cobos, PT [facility]   5/10/2024  9:20 AM Behrens, Laura M, PTA [facility]   5/15/2024  9:20 AM Behrens, Laura M, PTA [facility]   5/17/2024  9:20 AM Behrens, Laura M, PTA [facility]   1/15/2025  1:00 PM [facility] NURSE [facility]   1/22/2025  2:40 PM Reyes, Charlene N, PA [facility] Sched

## 2024-03-28 NOTE — TELEPHONE ENCOUNTER
[facility] was not able to process the request because the previous Prior Authorization Request was Denied, please submit an electronic Appeal Request. You can also contact the plan at [phone] or fax in request to [phone]. Case ID#[id number]

## 2024-03-28 NOTE — TELEPHONE ENCOUNTER
Called patient [phone] and was unable to leave a voice mail. I was calling to confirm that we see him under worker's compensation or not. IF we see him under worker's compensation then we do not generally do a prior authorization on those medications. We just submit the worker's compensation information to the pharmacy and they process it. This medication will go through the same process as the Gabapentin to be paid for.

## 2024-03-29 ENCOUNTER — HOSPITAL ENCOUNTER (EMERGENCY)
Facility: HOSPITAL | Age: 69
Discharge: HOME | End: 2024-03-29
Attending: EMERGENCY MEDICINE
Payer: MEDICARE

## 2024-03-29 VITALS
DIASTOLIC BLOOD PRESSURE: 54 MMHG | OXYGEN SATURATION: 95 % | SYSTOLIC BLOOD PRESSURE: 129 MMHG | WEIGHT: 237.4 LBS | BODY MASS INDEX: 33.99 KG/M2 | HEART RATE: 85 BPM | RESPIRATION RATE: 17 BRPM | TEMPERATURE: 97.9 F | HEIGHT: 70 IN

## 2024-03-29 DIAGNOSIS — H10.33 ACUTE CONJUNCTIVITIS OF BOTH EYES, UNSPECIFIED ACUTE CONJUNCTIVITIS TYPE: Primary | ICD-10-CM

## 2024-03-29 PROCEDURE — 99283 EMERGENCY DEPT VISIT LOW MDM: CPT

## 2024-03-29 RX ORDER — OFLOXACIN 3 MG/ML
3 SOLUTION/ DROPS OPHTHALMIC 4 TIMES DAILY
Qty: 5 ML | Refills: 1 | Status: SHIPPED | OUTPATIENT
Start: 2024-03-29 | End: 2024-04-15

## 2024-03-29 ASSESSMENT — PAIN - FUNCTIONAL ASSESSMENT: PAIN_FUNCTIONAL_ASSESSMENT: NONE - DENIES PAIN

## 2024-03-29 ASSESSMENT — LIFESTYLE VARIABLES
HOW OFTEN DO YOU HAVE A DRINK CONTAINING ALCOHOL: NEVER
HOW MANY STANDARD DRINKS CONTAINING ALCOHOL DO YOU HAVE ON A TYPICAL DAY: PATIENT DOES NOT DRINK

## 2024-03-29 ASSESSMENT — VISUAL ACUITY
OS: 20/30
OU: 20/40
OD: 20/50

## 2024-03-29 NOTE — TELEPHONE ENCOUNTER
Patient identified 2 patient identifiers. He was happy to hear he has a prescription and MDP pack at his pharmacy.

## 2024-03-29 NOTE — ED PROVIDER NOTES
None    Smoking Cessation: Not Applicable    PROCEDURES   Unless otherwise noted above, none  Procedures      CRITICAL CARE TIME   Patient does not meet Critical Care Time, 0 minutes    ED IMPRESSION     1. Acute conjunctivitis of both eyes, unspecified acute conjunctivitis type          DISPOSITION/PLAN   DISPOSITION Decision To Discharge 03/29/2024 12:05:56 PM    Discharge Note: The patient is stable for discharge home. The signs, symptoms, diagnosis, and discharge instructions have been discussed, understanding conveyed, and agreed upon. The patient is to follow up as recommended or return to ER should their symptoms worsen.      PATIENT REFERRED TO:   Opthalmology  [address] [place] [address]  [phone]  Call in 2 days          DISCHARGE MEDICATIONS:     Medication List        START taking these medications      ofloxacin 0.3 % solution  Commonly known as: Ocuflox  Place 3 drops into both eyes 4 times daily for 17 days            ASK your doctor about these medications      acetaminophen 500 MG tablet  Commonly known as: TYLENOL     ALPRAZolam 0.5 MG tablet  Commonly known as: XANAX     apixaban 2.5 MG Tabs tablet  Commonly known as: Eliquis  Take 1 tablet by mouth 2 times daily for 5 days     butalbital-APAP-caffeine -40 MG Caps per capsule     ezetimibe 10 MG tablet  Commonly known as: ZETIA     * gabapentin 100 MG capsule  Commonly known as: NEURONTIN     * gabapentin 300 MG capsule  Commonly known as: NEURONTIN     labetalol 100 MG tablet  Commonly known as: NORMODYNE     lansoprazole 30 MG delayed release capsule  Commonly known as: PREVACID     lisinopril-hydroCHLOROthiazide 20-12.5 MG per tablet  Commonly known as: PRINZIDE;ZESTORETIC     metaxalone 800 MG tablet  Commonly known as: Skelaxin  Take 1 tablet by mouth in the morning and at bedtime     methocarbamol 500 MG tablet  Commonly known as: ROBAXIN  Take 1 tablet by mouth 2 times daily as needed (Lumbar Pain)

## 2024-03-29 NOTE — ED NOTES
Orders Placed This Encounter    blood glucose test strips (TRUE METRIX BLOOD GLUCOSE TEST) strip     Sig: Use to test blood sugar once a day     Dispense:  100 each     Refill:  3    TRUEplus Lancets 28G MISC     Sig: Use to test blood sugar once daily     Dispense:  100 each     Refill:  3     Dispense Trueplus Super Thin 28G Lancet Visual AcuityBilateral Distance: 20/40R Distance: 20/50L Distance: 20/30

## 2024-03-29 NOTE — TELEPHONE ENCOUNTER
Patient identified 2 patient identifiers. He was happy to know a prescription was sent in as well as a MDP.

## 2024-03-29 NOTE — ED TRIAGE NOTES
Ambulatory pt c/o \"eyes sticking together\", pink coloration, and small amount of yellow discharge from bilateral eyes. Recently had pink eye 2-3 weeks ago. Pt denies vision changes

## 2024-03-29 NOTE — TELEPHONE ENCOUNTER
Tried calling patient twice to let him know that his prescriptions were sent to the pharmacy and it goes straight to a VM that is not set up.

## 2024-04-03 ENCOUNTER — HOSPITAL ENCOUNTER (OUTPATIENT)
Facility: HOSPITAL | Age: 69
Setting detail: RECURRING SERIES
Discharge: HOME | End: 2024-04-06
Payer: MEDICARE

## 2024-04-03 PROCEDURE — 97140 MANUAL THERAPY 1/> REGIONS: CPT

## 2024-04-03 PROCEDURE — 97016 VASOPNEUMATIC DEVICE THERAPY: CPT

## 2024-04-03 PROCEDURE — 97110 THERAPEUTIC EXERCISES: CPT

## 2024-04-03 NOTE — PROGRESS NOTES
PHYSICAL / OCCUPATIONAL THERAPY - DAILY TREATMENT NOTE    Patient Name: Kalen Rivas    Date: 4/3/2024    : 1955  Insurance: Payor: MEDICARE / Plan: MEDICARE PART A AND B / Product Type: *No Product type* /      Patient  verified Yes     Visit #   Current / Total 6 36   Time   In / Out 8:40 9:39   Pain   In / Out 3/10 3/10   Subjective Functional Status/Changes: Pt. Reports he is feeling sore today but is doing ok.      TREATMENT AREA =  Pain in right knee [M25.561]    OBJECTIVE    Modalities Rationale:     decrease edema, decrease inflammation, and decrease pain to improve patient's ability to progress to PLOF and address remaining functional goals.     min [] Estim Unattended, type/location:                                      []  w/ice    []  w/heat    min [] Estim Attended, type/location:                                     []  w/US     []  w/ice    []  w/heat    []  TENS insruct      min []  Mechanical Traction: type/lbs                   []  pro   []  sup   []  int   []  cont    []  before manual    []  after manual    min []  Ultrasound, settings/location:      min  unbill []  Ice     []  Heat    location/position:     min []  Paraffin,  details:    15 min []  Vasopneumatic Device, press/temp: Low/low with knee in extension     min []  Whirlpool / Fluido:    If using vaso (only need to measure limb vaso being performed on)      pre-treatment girth : 44 cm      post-treatment girth : 43.5 cm      measured at (landmark location) :  mid patella    min []  Other:    Skin assessment post-treatment:   Intact      Therapeutic Procedures:    Tx Min Billable or 1:1 Min (if diff from Tx Min) Procedure, Rationale, Specifics   10  51956 Manual Therapy (timed):  decrease pain, increase ROM, and increase tissue extensibility to improve patient's ability to progress to PLOF and address remaining functional goals.  The manual therapy interventions were performed at a separate and distinct time from the

## 2024-04-05 ENCOUNTER — HOSPITAL ENCOUNTER (OUTPATIENT)
Facility: HOSPITAL | Age: 69
Setting detail: RECURRING SERIES
Discharge: HOME | End: 2024-04-08
Payer: MEDICARE

## 2024-04-05 PROCEDURE — 97112 NEUROMUSCULAR REEDUCATION: CPT

## 2024-04-05 PROCEDURE — 97530 THERAPEUTIC ACTIVITIES: CPT

## 2024-04-05 PROCEDURE — 97110 THERAPEUTIC EXERCISES: CPT

## 2024-04-05 NOTE — PROGRESS NOTES
and proprioception to improve patient's ability to develop conscious control of individual muscles and awareness of position of extremities in order to progress to PLOF and address remaining functional goals. (see flow sheet as applicable)     Details if applicable: see flow sheet     15  99925 Therapeutic Activity (timed):  use of dynamic activities replicating functional movements to increase ROM, strength, coordination, balance, and proprioception in order to improve patient's ability to progress to PLOF and address remaining functional goals.  (see flow sheet as applicable)      Details if applicable: see flow sheet   15  66248 Therapeutic Exercise (timed):  increase ROM, strength, coordination, balance, and proprioception to improve patient's ability to progress to PLOF and address remaining functional goals. (see flow sheet as applicable)     Details if applicable:  see flow sheet   43   BC Totals Reminder: bill using total billable min of TIMED therapeutic procedures (example: do not include dry needle or estim unattended, both untimed codes, in totals to left)  8-22 min = 1 unit; 23-37 min = 2 units; 38-52 min = 3 units; 53-67 min = 4 units; 68-82 min = 5 units   Total Total     [x]  Patient Education billed concurrently with other procedures   [x] Review HEP    [] Progressed/Changed HEP, detail:    [] Other detail:       Objective Information/Functional Measures/Assessment  Assist for end range HSC due to weakness  Added interventions per flow sheet  Able to perform step up and downs from 6\" 1 UE assist  Slight increase quad pain after step downs improved with manual stretching then standing assisted quad stretch  Challenged with rockerboard balance and EC on foam    Assessment: Pt progressing with decreasing right knee pain and decreased swelling. He demonstrates improving gait and improving ability on step ups with decreased UE use. He continues to be challenged with full ROM of the right knee partially

## 2024-04-09 ENCOUNTER — HOSPITAL ENCOUNTER (OUTPATIENT)
Facility: HOSPITAL | Age: 69
Setting detail: RECURRING SERIES
Discharge: HOME | End: 2024-04-12
Payer: MEDICARE

## 2024-04-09 PROCEDURE — 97530 THERAPEUTIC ACTIVITIES: CPT

## 2024-04-09 PROCEDURE — 97112 NEUROMUSCULAR REEDUCATION: CPT

## 2024-04-09 PROCEDURE — 97110 THERAPEUTIC EXERCISES: CPT

## 2024-04-09 NOTE — PROGRESS NOTES
topicals  Right knee circumference at mid patella 43 cm    Assessment: Pt making steady progress with decreased right knee swelling by 3 cm compared to previously measured. He demonstrates improving right knee flexion AROM with limitations from quad tightness. His TKE is limited by decreased screw home mechanism and HS/calf tightness. Will continue to progress strength and flexibility for ease of sit to stand, car transfers and ambulation with LRAD.    Patient will continue to benefit from skilled PT / OT services to modify and progress therapeutic interventions, analyze and address functional mobility deficits, analyze and address ROM deficits, analyze and address strength deficits, analyze and address soft tissue restrictions, analyze and cue for proper movement patterns, analyze and modify for postural abnormalities, and analyze and address imbalance/dizziness to address functional deficits and attain remaining goals.    Progress toward goals / Updated goals:  [x]  See Progress Note/Recertification    Short Term Goals: To be accomplished in 4 weeks  Goal: Patient will demonstrate compliance with HEP in order to improve right knee mobility for increased ease of ADLs   Status at evaluation/last progress note: n/a  Met (3/20/24)     2.   Goal: Patient will improve right knee AROM 3-90 degrees in order to increase ease of ambulation.   Status at evaluation/last progress note: 6-75 degrees  Progressin-91 degrees (4/3/24)  Knee flexion AROM 101 degrees (24)     Long Term Goals: To be accomplished in 12 weeks  Goal: Patient will improve FOTO score by 18 points in order to demonstrate a significant improvement in function.   Status at evaluation/last progress note: 32 points     2.   Goal: Patient will improve right knee AROM 0-110 degrees in order to increase ease of ambulation   Status at evaluation/last progress note: 6-75 degrees  Not met: 5-91 degrees (4/3/24)  Knee flexion AROM 101 degrees (24)     3.

## 2024-04-11 NOTE — PROGRESS NOTES
[facility]  [address] [place] [address]., [address]  [place] [address]  Phone: [phone]  Fax: [phone]     Pt's YOB: 1955    ASSESSMENT   Kalen was seen today for lower back pain.    Diagnoses and all orders for this visit:    Lumbar radiculitis  -     gabapentin (NEURONTIN) 300 MG capsule; Take 1 capsule by mouth in the morning and at bedtime for 180 days. Take for neuropathic symptoms Max Daily Amount: 600 mg    Sacroiliac joint pain    Muscle spasm  -     methocarbamol (ROBAXIN) 500 MG tablet; Take 1 tablet by mouth 2 times daily as needed (Lumbar Pain)    Spinal stenosis of lumbar region with neurogenic claudication    Lumbar facet arthropathy    Long term (current) use of anticoagulants    H/O total knee replacement, right         IMPRESSION AND PLAN:  Kalen Rivas is a 69 y.o. RHD male with history of lumbar pain and presents to the office today for medication follow up. He is taking Skelaxin 800 mg 2 caps QHS, Neurontin 300 mg 2 po BID, and percocet, and is using moist heat. He supplements with Vitamin D and Zinc. Patient is on Eliquis. He previously had an MDP with benefit.     Per last office note, he is being followed by Mateo Headley PA-C. Patient had a right L3 and right L4 SNRB by Dr. Chau Cheng MD on 08/03/23 with benefit.     Mr. Rivsa has a reminder for a \"due or due soon\" health maintenance. I have asked that he contact his primary care provider, Omaira Macias MD, for follow-up on this health maintenance.   demonstrated consistency with prescribing.   Gabapentin 300 mg was refilled for neuropathic symptoms  Robaxin 500 mg was refilled for muscle spasm -- he previously was doing very well on Skelaxin 800 mg but this has not been approved with his new insurance  Patient was given paperwork for DMV placard.  Return in about 4 months (around 8/15/2024) for Medication follow up.        HISTORY OF PRESENT ILLNESS:  Kalen

## 2024-04-12 ENCOUNTER — HOSPITAL ENCOUNTER (OUTPATIENT)
Facility: HOSPITAL | Age: 69
Setting detail: RECURRING SERIES
Discharge: HOME | End: 2024-04-15
Payer: MEDICARE

## 2024-04-12 PROCEDURE — 97530 THERAPEUTIC ACTIVITIES: CPT

## 2024-04-12 PROCEDURE — 97112 NEUROMUSCULAR REEDUCATION: CPT

## 2024-04-12 PROCEDURE — 97110 THERAPEUTIC EXERCISES: CPT

## 2024-04-12 NOTE — PROGRESS NOTES
PHYSICAL / OCCUPATIONAL THERAPY - DAILY TREATMENT NOTE    Patient Name: Kalen Rivas    Date: 2024    : 1955  Insurance: Payor: MEDICARE / Plan: MEDICARE PART A AND B / Product Type: *No Product type* /      Patient  verified Yes     Visit #   Current / Total 9 36   Time   In / Out 9:27 10:15   Pain   In / Out 3/10 2-3/10   Subjective Functional Status/Changes: Pt reports walking some without his cane. He is very pleased with his progress. He is still dealing with some right sciatic pain and LBP but is hoping to hold off on surgery. He has had more left knee swelling recently.      TREATMENT AREA =  Pain in right knee [M25.561]    OBJECTIVE    Modalities Rationale:     decrease edema, decrease inflammation, and decrease pain to improve patient's ability to progress to PLOF and address remaining functional goals.     min [] Estim Unattended, type/location:                                      []  w/ice    []  w/heat    min [] Estim Attended, type/location:                                     []  w/US     []  w/ice    []  w/heat    []  TENS insruct      min []  Mechanical Traction: type/lbs                   []  pro   []  sup   []  int   []  cont    []  before manual    []  after manual    min []  Ultrasound, settings/location:     10 min  unbill [x]  Ice     []  Heat    location/position: B knees semi-reclined with legs elevated    min []  Paraffin,  details:     min []  Vasopneumatic Device, press/temp:     min []  Whirlpool / Fluido:    If using vaso (only need to measure limb vaso being performed on)      pre-treatment girth :  cm      post-treatment girth :  cm      measured at (landmark location) :  mid patella    min []  Other:    Skin assessment post-treatment:   Intact      Therapeutic Procedures:    Tx Min Billable or 1:1 Min (if diff from Tx Min) Procedure, Rationale, Specifics   10  46426 Neuromuscular Re-Education (timed):  improve balance, coordination, kinesthetic sense, posture, core 
  Goal: Patient will improve right knee AROM 3-90 degrees in order to increase ease of ambulation.   Status at evaluation/last progress note: 6-75 degrees  Current: progressing 5-100 degrees    Unmet Goals From Plan of Care:  Goal: Patient will improve FOTO score by 18 points in order to demonstrate a significant improvement in function.   Status at evaluation/last progress note: 32 points  Current: Not yet reassessed     2.   Goal: Patient will improve right knee AROM 0-110 degrees in order to increase ease of ambulation   Status at evaluation/last progress note: 6-75 degrees  Current: progressing 5-100 degrees     3.   Goal: Patient will improve right knee MMT to 5/5 in order to increase ease of standing up from chairs.   Status at evaluation/last progress note: ext: 4/5 flex: 4+/5  Current: 4+/5      4.   Goal: Patient will improve TUG test to 12 seconds in order to increase ease of ambulation at home.   Status at evaluation/last progress note: 21 seconds   Current: progressing 13 seconds (average of 3 trials with SPC)    Medicare, cannot change goals, cannot adjust frequency/duration, no signature required   Reporting Period: (date from last Prog Note/Eval to current Prog Note/Recert)  03/15/2024 - 04/12/2024    RECOMMENDATIONS  Continue therapy per initial Plan of Care or most recent Medicare Recert.      If you have any questions/comments please contact us directly.  Thank you for allowing us to assist in the care of your patient.    Laura M Behrens, PTA       4/12/2024       7:48 AM

## 2024-04-15 ENCOUNTER — OFFICE VISIT (OUTPATIENT)
Age: 69
End: 2024-04-15
Payer: OTHER GOVERNMENT

## 2024-04-15 VITALS — HEART RATE: 80 BPM | WEIGHT: 235 LBS | OXYGEN SATURATION: 98 % | HEIGHT: 70 IN | BODY MASS INDEX: 33.64 KG/M2

## 2024-04-15 DIAGNOSIS — M48.062 SPINAL STENOSIS OF LUMBAR REGION WITH NEUROGENIC CLAUDICATION: ICD-10-CM

## 2024-04-15 DIAGNOSIS — M54.16 LUMBAR RADICULITIS: Primary | ICD-10-CM

## 2024-04-15 DIAGNOSIS — Z79.01 LONG TERM (CURRENT) USE OF ANTICOAGULANTS: ICD-10-CM

## 2024-04-15 DIAGNOSIS — M53.3 SACROILIAC JOINT PAIN: ICD-10-CM

## 2024-04-15 DIAGNOSIS — Z96.651 H/O TOTAL KNEE REPLACEMENT, RIGHT: ICD-10-CM

## 2024-04-15 DIAGNOSIS — M47.816 LUMBAR FACET ARTHROPATHY: ICD-10-CM

## 2024-04-15 DIAGNOSIS — M62.838 MUSCLE SPASM: ICD-10-CM

## 2024-04-15 PROCEDURE — 99214 OFFICE O/P EST MOD 30 MIN: CPT | Performed by: PHYSICAL MEDICINE & REHABILITATION

## 2024-04-15 PROCEDURE — 1123F ACP DISCUSS/DSCN MKR DOCD: CPT | Performed by: PHYSICAL MEDICINE & REHABILITATION

## 2024-04-15 RX ORDER — TRAMADOL HYDROCHLORIDE 50 MG/1
TABLET ORAL
COMMUNITY
Start: 2024-04-12

## 2024-04-15 RX ORDER — GABAPENTIN 300 MG/1
300 CAPSULE ORAL 2 TIMES DAILY
Qty: 180 CAPSULE | Refills: 1 | Status: SHIPPED | OUTPATIENT
Start: 2024-04-15 | End: 2024-10-12

## 2024-04-15 RX ORDER — METHOCARBAMOL 500 MG/1
500 TABLET, FILM COATED ORAL 2 TIMES DAILY PRN
Qty: 180 TABLET | Refills: 1 | Status: SHIPPED | OUTPATIENT
Start: 2024-04-15 | End: 2024-10-12

## 2024-04-15 RX ORDER — OXYCODONE HYDROCHLORIDE AND ACETAMINOPHEN 5; 325 MG/1; MG/1
TABLET ORAL
COMMUNITY

## 2024-04-17 ENCOUNTER — HOSPITAL ENCOUNTER (OUTPATIENT)
Facility: HOSPITAL | Age: 69
Setting detail: RECURRING SERIES
Discharge: HOME | End: 2024-04-20
Payer: MEDICARE

## 2024-04-17 PROCEDURE — 97110 THERAPEUTIC EXERCISES: CPT

## 2024-04-17 PROCEDURE — 97112 NEUROMUSCULAR REEDUCATION: CPT

## 2024-04-17 PROCEDURE — 97530 THERAPEUTIC ACTIVITIES: CPT

## 2024-04-17 NOTE — PROGRESS NOTES
post-treatment:   Intact      Therapeutic Procedures:    Tx Min Billable or 1:1 Min (if diff from Tx Min) Procedure, Rationale, Specifics   10  57397 Neuromuscular Re-Education (timed):  improve balance, coordination, kinesthetic sense, posture, core stability and proprioception to improve patient's ability to develop conscious control of individual muscles and awareness of position of extremities in order to progress to PLOF and address remaining functional goals. (see flow sheet as applicable)     Details if applicable: see flow sheet; pes anserine facilitation, TKE strengthening     20  39553 Therapeutic Activity (timed):  use of dynamic activities replicating functional movements to increase ROM, strength, coordination, balance, and proprioception in order to improve patient's ability to progress to PLOF and address remaining functional goals.  (see flow sheet as applicable)      Details if applicable: see flow sheet; sit to stands, crossing legs   20  75919 Therapeutic Exercise (timed):  increase ROM, strength, coordination, balance, and proprioception to improve patient's ability to progress to PLOF and address remaining functional goals. (see flow sheet as applicable)     Details if applicable:  see flow sheet   50  MC BC Totals Reminder: bill using total billable min of TIMED therapeutic procedures (example: do not include dry needle or estim unattended, both untimed codes, in totals to left)  8-22 min = 1 unit; 23-37 min = 2 units; 38-52 min = 3 units; 53-67 min = 4 units; 68-82 min = 5 units   Total Total     [x]  Patient Education billed concurrently with other procedures   [x] Review HEP    [] Progressed/Changed HEP, detail:    [] Other detail:       Objective Information/Functional Measures/Assessment  Check next session; pt has lift in right shoe (I believe this was done pre op due to right knee pain but need to recheck for LLD or if it needs to be removed)  Decreased left TKE ability  Blanching with

## 2024-04-19 ENCOUNTER — HOSPITAL ENCOUNTER (OUTPATIENT)
Facility: HOSPITAL | Age: 69
Setting detail: RECURRING SERIES
Discharge: HOME | End: 2024-04-22
Payer: MEDICARE

## 2024-04-19 PROCEDURE — 97530 THERAPEUTIC ACTIVITIES: CPT

## 2024-04-19 PROCEDURE — 97140 MANUAL THERAPY 1/> REGIONS: CPT

## 2024-04-19 PROCEDURE — 97110 THERAPEUTIC EXERCISES: CPT

## 2024-04-19 NOTE — PROGRESS NOTES
knee lacking TKE and improving with flexion mobility  Right ITB tightness and lack of tibial IR motion for flexion  Improved foot position with less pes planus in new shoes  Pt felt good after pelvic correction    Pt continues to progress well with right knee mobility and strength. He has loss of ROM at end ranges for TKE and knee flexion but is progressing well in therapy. He is more limited by lack of TKE control on the left causing pain and genu recurvatum. He has improved form with sit to stand transfers and is most limited with ability to cross his right knee over his left for donning/doffing his shoes.     Patient will continue to benefit from skilled PT / OT services to modify and progress therapeutic interventions, analyze and address functional mobility deficits, analyze and address ROM deficits, analyze and address strength deficits, analyze and address soft tissue restrictions, analyze and cue for proper movement patterns, analyze and modify for postural abnormalities, and analyze and address imbalance/dizziness to address functional deficits and attain remaining goals.    Progress toward goals / Updated goals:  [x]  See Progress Note/Recertification    Goal: Patient will improve FOTO score by 18 points in order to demonstrate a significant improvement in function.   Status at evaluation/last progress note: 32 points  Current: Not yet reassessed     2.   Goal: Patient will improve right knee AROM 0-110 degrees in order to increase ease of ambulation   Status at evaluation/last progress note: 6-75 degrees  Current: progressing 5-100 degrees     3.   Goal: Patient will improve right knee MMT to 5/5 in order to increase ease of standing up from chairs.   Status at evaluation/last progress note: ext: 4/5 flex: 4+/5  Current: 4+/5      4.   Goal: Patient will improve TUG test to 12 seconds in order to increase ease of ambulation at home.   Status at evaluation/last progress note: 21 seconds   Current:

## 2024-04-24 ENCOUNTER — HOSPITAL ENCOUNTER (OUTPATIENT)
Facility: HOSPITAL | Age: 69
Setting detail: RECURRING SERIES
Discharge: HOME | End: 2024-04-27
Payer: MEDICARE

## 2024-04-24 PROCEDURE — 97110 THERAPEUTIC EXERCISES: CPT

## 2024-04-24 PROCEDURE — 97530 THERAPEUTIC ACTIVITIES: CPT

## 2024-04-24 PROCEDURE — 97112 NEUROMUSCULAR REEDUCATION: CPT

## 2024-04-24 NOTE — PROGRESS NOTES
PHYSICAL / OCCUPATIONAL THERAPY - DAILY TREATMENT NOTE    Patient Name: Kalen Rivas    Date: 2024    : 1955  Insurance: Payor: MEDICARE / Plan: MEDICARE PART A AND B / Product Type: *No Product type* /      Patient  verified Yes     Visit #   Current / Total 12 36   Time   In / Out 9:21 10:15   Pain   In / Out 3/10 2-3/10   Subjective Functional Status/Changes: Pt reports he brought two pairs of shoes he bought to see which are better. He is going  to podiatrist to ask about new orthotics. He is having to use his cane more now for the left knee than for the right (left knee two previous surgeries with full ROM passively but lacks TKE strength).     TREATMENT AREA =  Pain in right knee [M25.561]    OBJECTIVE    Modalities Rationale:     decrease edema, decrease inflammation, and decrease pain to improve patient's ability to progress to PLOF and address remaining functional goals.     min [] Estim Unattended, type/location:                                      []  w/ice    []  w/heat    min [] Estim Attended, type/location:                                     []  w/US     []  w/ice    []  w/heat    []  TENS insruct      min []  Mechanical Traction: type/lbs                   []  pro   []  sup   []  int   []  cont    []  before manual    []  after manual    min []  Ultrasound, settings/location:     10 min  unbill [x]  Ice     []  Heat    location/position: B knees semi-reclined with legs elevated    min []  Paraffin,  details:     min []  Vasopneumatic Device, press/temp:     min []  Whirlpool / Fluido:    If using vaso (only need to measure limb vaso being performed on)      pre-treatment girth :  cm      post-treatment girth :  cm      measured at (landmark location) :  mid patella    min []  Other:    Skin assessment post-treatment:   Intact      Therapeutic Procedures:    Tx Min Billable or 1:1 Min (if diff from Tx Min) Procedure, Rationale, Specifics   10  [id number] Neuromuscular Re-Education

## 2024-04-26 ENCOUNTER — HOSPITAL ENCOUNTER (OUTPATIENT)
Facility: HOSPITAL | Age: 69
Setting detail: RECURRING SERIES
Discharge: HOME | End: 2024-04-29
Payer: MEDICARE

## 2024-04-26 PROCEDURE — 97110 THERAPEUTIC EXERCISES: CPT

## 2024-04-26 PROCEDURE — 97140 MANUAL THERAPY 1/> REGIONS: CPT

## 2024-04-26 NOTE — PROGRESS NOTES
PHYSICAL / OCCUPATIONAL THERAPY - DAILY TREATMENT NOTE    Patient Name: Kalen Rivas    Date: 2024    : 1955  Insurance: Payor: MEDICARE / Plan: MEDICARE PART A AND B / Product Type: *No Product type* /      Patient  verified Yes     Visit #   Current / Total 13 36   Time   In / Out 9:22 10:10   Pain   In / Out 3/10 2-3/10   Subjective Functional Status/Changes: Pt. Reports he is feeling pretty good today. He has been working on his HEP     TREATMENT AREA =  Pain in right knee [M25.561]    OBJECTIVE    Modalities Rationale:     decrease pain and increase tissue extensibility to improve patient's ability to progress to PLOF and address remaining functional goals.     min [] Estim Unattended, type/location:                                      []  w/ice    []  w/heat    min [] Estim Attended, type/location:                                     []  w/US     []  w/ice    []  w/heat    []  TENS insruct      min []  Mechanical Traction: type/lbs                   []  pro   []  sup   []  int   []  cont    []  before manual    []  after manual    min []  Ultrasound, settings/location:     10 min  unbill [x]  Ice     []  Heat    location/position: Supine with wedge  B knees    min []  Paraffin,  details:     min []  Vasopneumatic Device, press/temp:     min []  Whirlpool / Fluido:    If using vaso (only need to measure limb vaso being performed on)      pre-treatment girth :       post-treatment girth :       measured at (landmark location) :      min []  Other:    Skin assessment post-treatment:   Intact      Therapeutic Procedures:    Tx Min Billable or 1:1 Min (if diff from Tx Min) Procedure, Rationale, Specifics   23 [id number] Therapeutic Exercise (timed):  increase ROM, strength, coordination, balance, and proprioception to improve patient's ability to progress to PLOF and address remaining functional goals. (see flow sheet as applicable)     Details if applicable:  see flow sheet     9  [id number] Manual

## 2024-04-30 ENCOUNTER — TELEPHONE (OUTPATIENT)
Age: 69
End: 2024-04-30

## 2024-04-30 NOTE — TELEPHONE ENCOUNTER
Patient was told by [facility] pharmacy to call us for prior auth for 90 day supply of methocarbamol (ROBAXIN) 500 MG tablet.

## 2024-05-01 ENCOUNTER — HOSPITAL ENCOUNTER (OUTPATIENT)
Facility: HOSPITAL | Age: 69
Setting detail: RECURRING SERIES
Discharge: HOME | End: 2024-05-04
Payer: MEDICARE

## 2024-05-01 PROCEDURE — 97110 THERAPEUTIC EXERCISES: CPT

## 2024-05-01 NOTE — PROGRESS NOTES
PHYSICAL / OCCUPATIONAL THERAPY - DAILY TREATMENT NOTE    Patient Name: Kalen Rivas    Date: 2024    : 1955  Insurance: Payor: MEDICARE / Plan: MEDICARE PART A AND B / Product Type: *No Product type* /      Patient  verified Yes     Visit #   Current / Total 14 36   Time   In / Out 9:20 10:16   Pain   In / Out 3/10 2/10   Subjective Functional Status/Changes: Pt reports last 3 nights waking up in recliner due to sharp pains in the left knee under the kneecap. He goes today to see Dr. Ortiz but mainly has questions about the left knee.      TREATMENT AREA =  Pain in right knee [M25.561]    OBJECTIVE    Modalities Rationale:     decrease pain and increase tissue extensibility to improve patient's ability to progress to PLOF and address remaining functional goals.     min [] Estim Unattended, type/location:                                      []  w/ice    []  w/heat    min [] Estim Attended, type/location:                                     []  w/US     []  w/ice    []  w/heat    []  TENS insruct      min []  Mechanical Traction: type/lbs                   []  pro   []  sup   []  int   []  cont    []  before manual    []  after manual    min []  Ultrasound, settings/location:     10 min  unbill [x]  Ice     []  Heat    location/position: Supine with wedge  B knees    min []  Paraffin,  details:     min []  Vasopneumatic Device, press/temp:     min []  Whirlpool / Fluido:    If using vaso (only need to measure limb vaso being performed on)      pre-treatment girth :       post-treatment girth :       measured at (landmark location) :      min []  Other:    Skin assessment post-treatment:   Intact      Therapeutic Procedures:    Tx Min Billable or 1:1 Min (if diff from Tx Min) Procedure, Rationale, Specifics   46  [id number] Therapeutic Exercise (timed):  increase ROM, strength, coordination, balance, and proprioception to improve patient's ability to progress to PLOF and address remaining functional

## 2024-05-03 ENCOUNTER — APPOINTMENT (OUTPATIENT)
Facility: HOSPITAL | Age: 69
End: 2024-05-03
Payer: MEDICARE

## 2024-05-03 NOTE — TELEPHONE ENCOUNTER
I am not sure who originally did his prior auth, but I am unable to resubmit it. It appears that the original was submitted incorrectly and it will not let me do another one. I also am unable to attach office notes (not sure how).  The only thing I can think to do is have the provider delete the previous RX and resend it so that the pharmacy can submit a new request and then maybe it will allow us to redo the prior auth. The problem is that whomever did the original prior auth, they answered no to the following question \"is the prescription being used for muscle spasms or a musculoskeletal condition\". This is a muscle relaxer, so the answer should have been yes and I have tried to resubmit it and it will not let me.

## 2024-05-08 ENCOUNTER — HOSPITAL ENCOUNTER (OUTPATIENT)
Facility: HOSPITAL | Age: 69
Setting detail: RECURRING SERIES
Discharge: HOME | End: 2024-05-11
Payer: MEDICARE

## 2024-05-08 PROCEDURE — 97140 MANUAL THERAPY 1/> REGIONS: CPT

## 2024-05-08 PROCEDURE — 97110 THERAPEUTIC EXERCISES: CPT

## 2024-05-08 PROCEDURE — 97530 THERAPEUTIC ACTIVITIES: CPT

## 2024-05-08 NOTE — PROGRESS NOTES
PHYSICAL / OCCUPATIONAL THERAPY - DAILY TREATMENT NOTE    Patient Name: Kalen Rivas    Date: 2024    : 1955  Insurance: Payor: MEDICARE / Plan: MEDICARE PART A AND B / Product Type: *No Product type* /      Patient  verified Yes     Visit #   Current / Total 15 36   Time   In / Out 9:22 10:14   Pain   In / Out 3/10 2/10   Subjective Functional Status/Changes: Pt. Reports he is feeling pretty good today. He was given prednisone for his swelling but it spiked his blood pressure     TREATMENT AREA =  Pain in right knee [M25.561]    OBJECTIVE    Modalities Rationale:     decrease pain and increase tissue extensibility to improve patient's ability to progress to PLOF and address remaining functional goals.                  min [] Estim Unattended, type/location:                                                 []  w/ice    []  w/heat     min [] Estim Attended, type/location:                                                []  w/US     []  w/ice    []  w/heat    []  TENS insruct       min []  Mechanical Traction: type/lbs                    []  pro   []  sup   []  int   []  cont    []  before manual    []  after manual     min []  Ultrasound, settings/location:      10 min  unbill [x]  Ice     []  Heat    location/position: Supine with wedge  B knees     min []  Paraffin,  details:       min []  Vasopneumatic Device, press/temp:       min []  Whirlpool / Fluido:     If using vaso (only need to measure limb vaso being performed on)      pre-treatment girth :       post-treatment girth :       measured at (landmark location) :       min []  Other:     Skin assessment post-treatment:   Intact      Therapeutic Procedures:    Tx Min Billable or 1:1 Min (if diff from Tx Min) Procedure, Rationale, Specifics   09 [id number] Therapeutic Exercise (timed):  increase ROM, strength, coordination, balance, and proprioception to improve patient's ability to progress to PLOF and address remaining functional goals. (see flow

## 2024-05-10 ENCOUNTER — HOSPITAL ENCOUNTER (OUTPATIENT)
Facility: HOSPITAL | Age: 69
Setting detail: RECURRING SERIES
Discharge: HOME | End: 2024-05-13
Payer: MEDICARE

## 2024-05-10 PROCEDURE — 97530 THERAPEUTIC ACTIVITIES: CPT

## 2024-05-10 PROCEDURE — 97110 THERAPEUTIC EXERCISES: CPT

## 2024-05-10 PROCEDURE — 97112 NEUROMUSCULAR REEDUCATION: CPT

## 2024-05-10 NOTE — PROGRESS NOTES
ODETTE DAWKINS [facility]  [address] [place] [address] - Ph: [phone]   Fx: [phone]  PHYSICAL THERAPY PROGRESS NOTE  [x] Progress Note  [] Discharge Summary    Patient Name: Kalen Rivas : 1955   Treatment/Medical Diagnosis: Pain in right knee [M25.561]   Referral Source: Jhonatan Ortiz MD     Date of Initial Visit: 3/15/2024 Attended Visits: 16 Missed Visits: 0       Comorbidities: Arthritis, history of left TKA () with revision (), history of B RTC repair, neck and back pain; history of 2 back surgeries, HTN, PVD, history of blood clots    Prior Level of Function:Lives in 1 story home with 3 steps to enter with spouse; retired; enjoys gardening, hiking, camping, and fishing; helps care for 2-year-old granddaughter      SUMMARY OF TREATMENT  Mr. Rivas is making steady progress towards goals in therapy meeting 1/4 long term goals His right knee AROM improved to  2-105 degrees. He is ambulating now with his SPC and sometimes uses nothing within his home. His right knee MMT increased to 5/5 flexion and 4+/5 extension. His TUG score improved to 11 seconds without an AD. He is most limited with full right knee flexion likely due to muscular restrictions prior to his knee replacement. He is also limited by his left knee pain and utilizes genu recurvatum for stability but will need another replacement in the future. Skilled PT remains medically necessary to achieve full AROM of the right knee and progress strength and balance for decreased fall risk and improved ease of ambulation, stairs and transfers with decreased pain.     CURRENT STATUS/Progress towards Goals:  Goal: Patient will improve FOTO score by 18 points in order to demonstrate a significant improvement in function.   Status at evaluation/last progress note: 32 points  Current: 49/100     2.   Goal: Patient will improve right knee AROM 0-110 degrees in order to 
diff from Tx Min) Procedure, Rationale, Specifics   20  75063 Therapeutic Exercise (timed):  increase ROM, strength, coordination, balance, and proprioception to improve patient's ability to progress to PLOF and address remaining functional goals. (see flow sheet as applicable)     Details if applicable:  see flow sheet     15  41567 Therapeutic Activity (timed):  use of dynamic activities replicating functional movements to increase ROM, strength, coordination, balance, and proprioception in order to improve patient's ability to progress to PLOF and address remaining functional goals.  (see flow sheet as applicable)     Details if applicable:  goal re-assessment   8  81482 Neuromuscular Re-Education (timed):  improve balance, coordination, kinesthetic sense, posture, core stability and proprioception to improve patient's ability to develop conscious control of individual muscles and awareness of position of extremities in order to progress to PLOF and address remaining functional goals. (see flow sheet as applicable)     Details if applicable:  dynamic gait across foam pad, stepping stones to each color dot, 2-6\" steps and BB#1   43  [facility] Totals Reminder: bill using total billable min of TIMED therapeutic procedures (example: do not include dry needle or estim unattended, both untimed codes, in totals to left)  8-22 min = 1 unit; 23-37 min = 2 units; 38-52 min = 3 units; 53-67 min = 4 units; 68-82 min = 5 units   Total Total     [x]  Patient Education billed concurrently with other procedures   [x] Review HEP    [] Progressed/Changed HEP, detail:    [] Other detail:       Objective Information/Functional Measures/Assessment  Right knee AROM: 2-105 degrees  FOTO: 49/100  Educated on working on standing weight shifts with left knee unlocked to attempt building some TKE stability  LOB when attempting to step up with the left during obstacle course due to locking left knee into genu recurvatum  Educated when

## 2024-05-15 ENCOUNTER — HOSPITAL ENCOUNTER (OUTPATIENT)
Facility: HOSPITAL | Age: 69
Setting detail: RECURRING SERIES
Discharge: HOME | End: 2024-05-18
Payer: MEDICARE

## 2024-05-15 PROCEDURE — 97112 NEUROMUSCULAR REEDUCATION: CPT

## 2024-05-15 PROCEDURE — 97530 THERAPEUTIC ACTIVITIES: CPT

## 2024-05-15 NOTE — PROGRESS NOTES
improvement in function.   Status at evaluation/last progress note: 49/100     2. Goal: Patient will improve right knee AROM 0-110 degrees in order to increase ease of ambulation   Status at evaluation/last progress note: 2-105 degrees     3. Goal: Patient will improve right knee MMT to 5/5 in order to increase ease of standing up from chairs.   Status at evaluation/last progress note: extension: 4+/5; flexion: 5/5    Next PN 5/9/24/ RC due 6/13/24      PLAN  - Continue Plan of Care  - Upgrade activities as tolerated    Myra Urban PT    5/15/2024    8:54 AM    Future Appointments   Date Time Provider Department Center   5/15/2024  9:20 AM Myra Gonzales, PT [facility]   5/17/2024  9:20 AM Behrens, Laura M, PTA [facility]   5/21/2024  3:20 PM Behrens, Laura M, PTA [facility]   8/14/2024  2:40 PM Aminta Pena MD [facility]   1/15/2025  1:00 PM [facility] NURSE [facility] [place] Sched   1/22/2025  2:40 PM Reyes, Charlene, PA [facility] [place] Sched

## 2024-05-17 ENCOUNTER — HOSPITAL ENCOUNTER (OUTPATIENT)
Facility: HOSPITAL | Age: 69
Setting detail: RECURRING SERIES
Discharge: HOME | End: 2024-05-20
Payer: MEDICARE

## 2024-05-17 PROCEDURE — 97110 THERAPEUTIC EXERCISES: CPT

## 2024-05-17 PROCEDURE — 97535 SELF CARE MNGMENT TRAINING: CPT

## 2024-05-17 PROCEDURE — 97530 THERAPEUTIC ACTIVITIES: CPT

## 2024-05-17 NOTE — PROGRESS NOTES
PHYSICAL / OCCUPATIONAL THERAPY - DAILY TREATMENT NOTE    Patient Name: Kalen Rivas    Date: 2024    : 1955  Insurance: Payor: MEDICARE / Plan: MEDICARE PART A AND B / Product Type: *No Product type* /      Patient  verified yes   Visit #   Current / Total 18 36   Time   In / Out 9:22 10:21   Pain   In / Out 3/10 right; 4/10 left 2/10 left; 3/10 right   Subjective Functional Status/Changes: Pt reports he is going to call the company about his BP cuff since it is still reading high. He was really sore after mowing and doing some yard work. He doesn't feel ready to be done with PT yet. He needs to work on the garden some today.      TREATMENT AREA =  Pain in right knee [M25.561]         OBJECTIVE    Modalities Rationale:     decrease edema, decrease inflammation, and decrease pain to improve patient's ability to progress to PLOF and address remaining functional goals.     min [] Estim Unattended, type/location:                                      []  w/ice    []  w/heat    min [] Estim Attended, type/location:                                     []  w/US     []  w/ice    []  w/heat    []  TENS insruct      min []  Mechanical Traction: type/lbs                   []  pro   []  sup   []  int   []  cont    []  before manual    []  after manual    min []  Ultrasound, settings/location:     10 min  unbill [x]  Ice     []  Heat    location/position: B anterior knees; right posterior knee    min []  Paraffin,  details:     min []  Vasopneumatic Device, press/temp:     min []  Whirlpool / Fluido:    If using vaso (only need to measure limb vaso being performed on)      pre-treatment girth :       post-treatment girth :       measured at (landmark location) :      min []  Other:    Skin assessment post-treatment:   Redness, no adverse reactions      Therapeutic Procedures:    Tx Min Billable or 1:1 Min (if diff from Tx Min) Procedure, Rationale, Specifics   15  15018 Therapeutic Exercise (timed):  increase

## 2024-05-21 ENCOUNTER — HOSPITAL ENCOUNTER (OUTPATIENT)
Facility: HOSPITAL | Age: 69
Setting detail: RECURRING SERIES
Discharge: HOME | End: 2024-05-24
Payer: MEDICARE

## 2024-05-21 PROCEDURE — 97530 THERAPEUTIC ACTIVITIES: CPT

## 2024-05-21 PROCEDURE — 97110 THERAPEUTIC EXERCISES: CPT

## 2024-05-21 NOTE — PROGRESS NOTES
proper movement patterns, analyze and modify for postural abnormalities, analyze and address imbalance/dizziness, and instruct in home and community integration to address functional deficits and attain remaining goals.    Progress toward goals / Updated goals:  [x]  See Progress Note/Recertification    1. Goal: Patient will improve FOTO score by 18 points in order to demonstrate a significant improvement in function.   Status at evaluation/last progress note: 49/100     2. Goal: Patient will improve right knee AROM 0-110 degrees in order to increase ease of ambulation   Status at evaluation/last progress note: 2-105 degrees     3. Goal: Patient will improve right knee MMT to 5/5 in order to increase ease of standing up from chairs.   Status at evaluation/last progress note: extension: 4+/5; flexion: 5/5    Next PN 06/08/2024 RC due 6/13/24    PLAN  - Continue Plan of Care  - Upgrade activities as tolerated    Laura M Behrens, PTA    5/21/2024    7:44 AM    Future Appointments   Date Time Provider Department Center   5/21/2024  3:20 PM Behrens, Laura M, PTA [facility]   6/24/2024  2:15 PM Darrian Schilling MD [facility] AMB   8/14/2024  2:40 PM Aminta Pena MD [facility] AM[facility]   1/15/2025  1:00 PM [facility] NURSE [facility] [place] Sched   1/22/2025  2:40 PM Reyes, Charlene, PA [facility] [place] Sched

## 2024-05-29 ENCOUNTER — HOSPITAL ENCOUNTER (OUTPATIENT)
Facility: HOSPITAL | Age: 69
Setting detail: RECURRING SERIES
Discharge: HOME | End: 2024-06-01
Payer: MEDICARE

## 2024-05-29 PROCEDURE — 97535 SELF CARE MNGMENT TRAINING: CPT

## 2024-05-29 PROCEDURE — 97110 THERAPEUTIC EXERCISES: CPT

## 2024-05-29 PROCEDURE — 97530 THERAPEUTIC ACTIVITIES: CPT

## 2024-05-29 NOTE — PROGRESS NOTES
[facility]  [address] [place] [address]  [phone] fax    Continued Plan of Care/ Re-certification for Physical Therapy Services    Patient name: Kalen Rivas Start of Care: 3/15/2024   Referral source: Jhonatan Ortiz MD : 1955   Medical/Treatment Diagnosis: M25.561  RIGHT KNEE PAIN   Payor: MEDICARE / Plan: MEDICARE PART A AND B / Product Type: *No Product type* /  Onset Date:24     Prior Hospitalization: see medical history Provider#: [id number]   Comorbidities:  Arthritis, history of left TKA () with revision (), history of B RTC repair, neck and back pain; history of 2 back surgeries, HTN, PVD, history of blood clots    Prior Level of Function: Lives in 1 story home with 3 steps to enter with spouse; retired; enjoys gardening, hiking, camping, and fishing; helps care for 2-year-old granddaughter   Visits from Start of Care: 19    Missed Visits: 0    The Plan of Care and following information is based on the patient's current status:  1. Goal: Patient will improve FOTO score by 18 points in order to demonstrate a significant improvement in function.   Status at evaluation/last progress note: 49/100  Current Status: met 52/100     2. Goal: Patient will improve right knee AROM 0-110 degrees in order to increase ease of ambulation   Status at evaluation/last progress note: 2-105 degrees  Current Status: not met 0-105 degrees before stretching 0-110 degrees post stretching     3. Goal: Patient will improve right knee MMT to 5/5 in order to increase ease of standing up from chairs.   Status at evaluation/last progress note: extension: 4+/5; flexion: 5/5  Current Status: met 5/5    Key functional changes: decreased pain, improved strength, improved AROM      Problems/ barriers to goal attainment: left knee pain, left knee locking into extension, LBP     Problem List: pain affecting function, decrease ROM, decrease strength,

## 2024-05-29 NOTE — PROGRESS NOTES
PHYSICAL / OCCUPATIONAL THERAPY - DAILY TREATMENT NOTE    Patient Name: Kalen Rivas    Date: 2024    : 1955  Insurance: Payor: MEDICARE / Plan: MEDICARE PART A AND B / Product Type: *No Product type* /      Patient  verified yes   Visit #   Current / Total 20 36   Time   In / Out 1:21 2:29   Pain   In / Out 2/10 right; 4/10 left 2/10 right; 3-4/10 left   Subjective Functional Status/Changes: Pt reports left knee progressively worsening. He states the right knee overall is better but he still can't do a lot because of the left knee. He was unable to do bridges because of the surface at home.      TREATMENT AREA =  Pain in right knee [M25.561]         OBJECTIVE    Modalities Rationale:     decrease edema, decrease inflammation, and decrease pain to improve patient's ability to progress to PLOF and address remaining functional goals.     min [] Estim Unattended, type/location:                                      []  w/ice    []  w/heat    min [] Estim Attended, type/location:                                     []  w/US     []  w/ice    []  w/heat    []  TENS insruct      min []  Mechanical Traction: type/lbs                   []  pro   []  sup   []  int   []  cont    []  before manual    []  after manual    min []  Ultrasound, settings/location:     10 min  unbill [x]  Ice     []  Heat    location/position: B knees    min []  Paraffin,  details:     min []  Vasopneumatic Device, press/temp:     min []  Whirlpool / Fluido:    If using vaso (only need to measure limb vaso being performed on)      pre-treatment girth :       post-treatment girth :       measured at (landmark location) :      min []  Other:    Skin assessment post-treatment:   Redness, no adverse reactions      Therapeutic Procedures:    Tx Min Billable or 1:1 Min (if diff from Tx Min) Procedure, Rationale, Specifics   18  11989 Therapeutic Exercise (timed):  increase ROM, strength, coordination, balance, and proprioception to improve

## 2024-06-12 ENCOUNTER — HOSPITAL ENCOUNTER (OUTPATIENT)
Facility: HOSPITAL | Age: 69
Setting detail: RECURRING SERIES
Discharge: HOME | End: 2024-06-15
Payer: MEDICARE

## 2024-06-12 PROCEDURE — 97110 THERAPEUTIC EXERCISES: CPT

## 2024-06-12 PROCEDURE — 97530 THERAPEUTIC ACTIVITIES: CPT

## 2024-06-12 PROCEDURE — 97535 SELF CARE MNGMENT TRAINING: CPT

## 2024-06-12 NOTE — PROGRESS NOTES
PHYSICAL / OCCUPATIONAL THERAPY - DAILY TREATMENT NOTE    Patient Name: Kalen Rivas    Date: 2024    : 1955  Insurance: Payor: MEDICARE / Plan: MEDICARE PART A AND B / Product Type: *No Product type* /      Patient  verified yes   Visit #   Current / Total 1 8   Time   In / Out 3:20 4:20   Pain   In / Out 2/10 right; 4-5/10 left 2/10 right   Subjective Functional Status/Changes: Pt reports 2-3 days of sharp anterior right knee pain that has now gone away. He has been having more and more left knee pain and swelling. He has been trying to complete yard work. He goes back to the MD soon and is hoping to discuss the left knee replacement or an injection if he can't have the knee done yet to help with the pain.      TREATMENT AREA =  Pain in right knee [M25.561]         OBJECTIVE      Therapeutic Procedures:    Tx Min Billable or 1:1 Min (if diff from Tx Min) Procedure, Rationale, Specifics   20  54180 Therapeutic Exercise (timed):  increase ROM, strength, coordination, balance, and proprioception to improve patient's ability to progress to PLOF and address remaining functional goals. (see flow sheet as applicable)     Details if applicable:  see flow sheet     20  12133 Therapeutic Activity (timed):  use of dynamic activities replicating functional movements to increase ROM, strength, coordination, balance, and proprioception in order to improve patient's ability to progress to PLOF and address remaining functional goals.  (see flow sheet as applicable)     Details if applicable: see flow sheet; goal reassessment   20  88131 Self Care/Home Management (timed):  improve patient knowledge and understanding of pain reducing techniques, positioning, posture/ergonomics, home safety, activity modification, diagnosis/prognosis, and physical therapy expectations, procedures and progression  to improve patient's ability to progress to PLOF and address remaining functional goals.  (see flow sheet as applicable)

## 2024-06-13 NOTE — PROGRESS NOTES
Physical Therapy Discharge Instructions      [facility]  [address] [place] [address] [phone] fax    Patient: Kalen Rivas  : 1955      Continue Home Exercise Program 3-4 times per week    Continue with    [x] Ice  as needed      [] Heat           Follow up with MD:     [x] Upon completion of therapy     [] As needed      Recommendations:     [x]   Return to activity with home program    []   Return to activity with the following modifications:       []Post Rehab Program    []Join Independent aquatic program     []Return to/join local gym

## 2024-06-14 NOTE — PROGRESS NOTES
[facility] - [facility]  [address] [place] [address]  [phone] fax    Physical Therapy Discharge Summary    Patient name: Kalen Rivas Start of Care: 3/15/2024    Referral source: Jhonatan Ortiz MD : 1955   Medical/Treatment Diagnosis: Pain in right knee [M25.561]  Payor: MEDICARE / Plan: MEDICARE PART A AND B / Product Type: *No Product type* /  Onset Date:24      Prior Hospitalization: see medical history Provider#: [id number]   Comorbidities: Arthritis, history of left TKA () with revision (), history of B RTC repair, neck and back pain; history of 2 back surgeries, HTN, PVD, history of blood clots   Prior Level of Function:Lives in 1 story home with 3 steps to enter with spouse; retired; enjoys gardening, hiking, camping, and fishing; helps care for 2-year-old granddaughter     Visits from Start of Care: 21    Missed Visits: 0    Reporting Period : 2024 to 2024    Summary of Care:  Goal: Patient will improve right knee AROM 0-115 degrees in order to increase ease of ambulation   Status at evaluation/last progress note: 0-110 degrees post stretching  Status at discharge: not met 0-108 degrees     2. Goal: Patient will be independent and compliant with a final comprehensive home program for continuing strengthening and ROM of the right knee for ease of ambulation, stairs and transfers.  Status at evaluation/last progress note: initiated last visit  Status at discharge: met      ASSESSMENT/RECOMMENDATIONS: Per last progress note on 24, Mr. Rivas remains highly motivated to progress in therapy and compliant with therapy recommendations. His right knee AROM improved to 0-105 degrees before stretching and 0-110 degrees post stretching. His FOTO score increased to 52/100 indicating global improvement in functional mobility in regards to his right kneee. His right knee strength increased to 5/5 with more weakness in his

## 2024-06-19 ENCOUNTER — APPOINTMENT (OUTPATIENT)
Facility: HOSPITAL | Age: 69
End: 2024-06-19
Payer: MEDICARE

## 2024-06-19 DIAGNOSIS — M54.16 LUMBAR RADICULITIS: ICD-10-CM

## 2024-06-20 RX ORDER — GABAPENTIN 300 MG/1
CAPSULE ORAL
Qty: 120 CAPSULE | Refills: 0 | OUTPATIENT
Start: 2024-06-20

## 2024-06-21 ENCOUNTER — TELEPHONE (OUTPATIENT)
Age: 69
End: 2024-06-21

## 2024-06-21 NOTE — TELEPHONE ENCOUNTER
Patient states that his pharmacy informed him that Dr. Pena refused the gabapentin (NEURONTIN) 300 MG capsule refill request and the patient wants to know why.    Patient telephone numbers  Home # [phone]  Cell # [phone]    [facility] [address] [place] [address] - P [phone] -  [phone]

## 2024-06-24 ENCOUNTER — OFFICE VISIT (OUTPATIENT)
Age: 69
End: 2024-06-24
Payer: MEDICARE

## 2024-06-24 VITALS
HEART RATE: 94 BPM | SYSTOLIC BLOOD PRESSURE: 130 MMHG | DIASTOLIC BLOOD PRESSURE: 60 MMHG | BODY MASS INDEX: 34.44 KG/M2 | OXYGEN SATURATION: 97 % | WEIGHT: 240 LBS

## 2024-06-24 DIAGNOSIS — R06.02 SHORTNESS OF BREATH: Primary | ICD-10-CM

## 2024-06-24 PROCEDURE — 1123F ACP DISCUSS/DSCN MKR DOCD: CPT | Performed by: INTERNAL MEDICINE

## 2024-06-24 PROCEDURE — 3075F SYST BP GE 130 - 139MM HG: CPT | Performed by: INTERNAL MEDICINE

## 2024-06-24 PROCEDURE — 3078F DIAST BP <80 MM HG: CPT | Performed by: INTERNAL MEDICINE

## 2024-06-24 PROCEDURE — 3017F COLORECTAL CA SCREEN DOC REV: CPT | Performed by: INTERNAL MEDICINE

## 2024-06-24 PROCEDURE — 1036F TOBACCO NON-USER: CPT | Performed by: INTERNAL MEDICINE

## 2024-06-24 PROCEDURE — 99214 OFFICE O/P EST MOD 30 MIN: CPT | Performed by: INTERNAL MEDICINE

## 2024-06-24 PROCEDURE — G8428 CUR MEDS NOT DOCUMENT: HCPCS | Performed by: INTERNAL MEDICINE

## 2024-06-24 PROCEDURE — G8417 CALC BMI ABV UP PARAM F/U: HCPCS | Performed by: INTERNAL MEDICINE

## 2024-06-24 NOTE — PROGRESS NOTES
Lactose Diarrhea     Abd cramps    Milk (Cow) Diarrhea    Nsaids Other (See Comments)     On blood thinner, contraindicated.     Povidone Iodine Other (See Comments)    Ropinirole Hcl Itching and Nausea Only    Simvastatin Other (See Comments)     \"I don't remember\"    Sulfa Antibiotics Hives    Penicillins Itching and Rash    Ropinirole Nausea And Vomiting       Family History:  Family History   Problem Relation Age of Onset    Rheum Arthritis Mother     Dementia Mother     Heart Disease Father     Cancer Father     Hypertension Other     Cancer Brother        Social History:  Social History     Tobacco Use    Smoking status: Never    Smokeless tobacco: Never   Substance Use Topics    Alcohol use: No    Drug use: Never     He  reports that he has never smoked. He has never used smokeless tobacco.  He  reports no history of alcohol use.    Physical Exam:  BP Readings from Last 3 Encounters:   06/24/24 130/60   03/29/24 (!) 129/54   02/27/24 125/70         Pulse Readings from Last 3 Encounters:   06/24/24 94   04/15/24 80   03/29/24 85          Wt Readings from Last 3 Encounters:   06/24/24 108.9 kg (240 lb)   04/15/24 106.6 kg (235 lb)   03/29/24 107.7 kg (237 lb 6.4 oz)       Constitutional: Oriented to person, place, and time.   HENT: Head: Normocephalic and atraumatic. Eyes: Conjunctivae and extraocular motions are normal.   Neck: No JVD present. Carotid bruit is not appreciated.   Cardiovascular: Regular rhythm.   No murmur, gallop or rubs appreciated  Lung: Breath sounds normal. No respiratory distress. No ronchi or rales appreciated  Abdominal: No tenderness. No rebound and no guarding.   Musculoskeletal: There is trace lower extremity edema. No cynosis  Lymphadenopathy:  No cervical or supraclavicular adenopathy appriciated.   Neurological: No gross motor deficit noted.  Skin: No visible skin rash noted.   No Ear discharge noted  Psychiatric: Normal mood and affect.     LABS:   @  Lab Results   Component

## 2024-06-24 NOTE — TELEPHONE ENCOUNTER
Pharmacy contacted and they do have this RX. LMOVM at [phone] telling the patient that the pharmacy does have an RX on file for him.

## 2024-06-26 ENCOUNTER — APPOINTMENT (OUTPATIENT)
Facility: HOSPITAL | Age: 69
End: 2024-06-26
Payer: MEDICARE

## 2024-07-31 ENCOUNTER — HOSPITAL ENCOUNTER (OUTPATIENT)
Facility: HOSPITAL | Age: 69
Discharge: HOME | End: 2024-08-03
Payer: MEDICARE

## 2024-07-31 ENCOUNTER — TRANSCRIBE ORDERS (OUTPATIENT)
Facility: HOSPITAL | Age: 69
End: 2024-07-31

## 2024-07-31 DIAGNOSIS — Z96.652 AFTERCARE FOLLOWING LEFT KNEE JOINT REPLACEMENT SURGERY: ICD-10-CM

## 2024-07-31 DIAGNOSIS — Z47.1 AFTERCARE FOLLOWING JOINT REPLACEMENT SURGERY, UNSPECIFIED JOINT: ICD-10-CM

## 2024-07-31 DIAGNOSIS — Z47.1 AFTERCARE FOLLOWING LEFT KNEE JOINT REPLACEMENT SURGERY: ICD-10-CM

## 2024-07-31 DIAGNOSIS — Z47.1 AFTERCARE FOLLOWING JOINT REPLACEMENT SURGERY, UNSPECIFIED JOINT: Primary | ICD-10-CM

## 2024-07-31 LAB
BASOPHILS # BLD: 0.1 K/UL (ref 0–0.1)
BASOPHILS NFR BLD: 1 % (ref 0–2)
CRP SERPL-MCNC: 1 MG/DL (ref 0–0.3)
DIFFERENTIAL METHOD BLD: ABNORMAL
EOSINOPHIL # BLD: 0.3 K/UL (ref 0–0.4)
EOSINOPHIL NFR BLD: 5 % (ref 0–5)
ERYTHROCYTE [DISTWIDTH] IN BLOOD BY AUTOMATED COUNT: 13.2 % (ref 11.6–14.5)
ERYTHROCYTE [SEDIMENTATION RATE] IN BLOOD: 23 MM/HR (ref 0–20)
HCT VFR BLD AUTO: 39.3 % (ref 36–48)
HGB BLD-MCNC: 13.4 G/DL (ref 13–16)
IMM GRANULOCYTES # BLD AUTO: 0 K/UL (ref 0–0.04)
IMM GRANULOCYTES NFR BLD AUTO: 1 % (ref 0–0.5)
LYMPHOCYTES # BLD: 1 K/UL (ref 0.9–3.6)
LYMPHOCYTES NFR BLD: 16 % (ref 21–52)
MCH RBC QN AUTO: 32.5 PG (ref 24–34)
MCHC RBC AUTO-ENTMCNC: 34.1 G/DL (ref 31–37)
MCV RBC AUTO: 95.4 FL (ref 78–100)
MONOCYTES # BLD: 0.9 K/UL (ref 0.05–1.2)
MONOCYTES NFR BLD: 15 % (ref 3–10)
NEUTS SEG # BLD: 4 K/UL (ref 1.8–8)
NEUTS SEG NFR BLD: 63 % (ref 40–73)
NRBC # BLD: 0 K/UL (ref 0–0.01)
NRBC BLD-RTO: 0 PER 100 WBC
PLATELET # BLD AUTO: 198 K/UL (ref 135–420)
PMV BLD AUTO: 10.4 FL (ref 9.2–11.8)
RBC # BLD AUTO: 4.12 M/UL (ref 4.35–5.65)
WBC # BLD AUTO: 6.4 K/UL (ref 4.6–13.2)

## 2024-07-31 PROCEDURE — 85652 RBC SED RATE AUTOMATED: CPT

## 2024-07-31 PROCEDURE — A9503 TC99M MEDRONATE: HCPCS | Performed by: PHYSICIAN ASSISTANT

## 2024-07-31 PROCEDURE — 86140 C-REACTIVE PROTEIN: CPT

## 2024-07-31 PROCEDURE — 36415 COLL VENOUS BLD VENIPUNCTURE: CPT

## 2024-07-31 PROCEDURE — 85025 COMPLETE CBC W/AUTO DIFF WBC: CPT

## 2024-07-31 PROCEDURE — 3430000000 HC RX DIAGNOSTIC RADIOPHARMACEUTICAL: Performed by: PHYSICIAN ASSISTANT

## 2024-07-31 RX ORDER — TC 99M MEDRONATE 20 MG/10ML
27.5 INJECTION, POWDER, LYOPHILIZED, FOR SOLUTION INTRAVENOUS
Status: COMPLETED | OUTPATIENT
Start: 2024-07-31 | End: 2024-07-31

## 2024-07-31 RX ADMIN — TC 99M MEDRONATE 27.5 MILLICURIE: 20 INJECTION, POWDER, LYOPHILIZED, FOR SOLUTION INTRAVENOUS at 11:50

## 2024-08-29 ENCOUNTER — OFFICE VISIT (OUTPATIENT)
Age: 69
End: 2024-08-29
Payer: OTHER GOVERNMENT

## 2024-08-29 VITALS
RESPIRATION RATE: 18 BRPM | WEIGHT: 244.4 LBS | BODY MASS INDEX: 34.99 KG/M2 | TEMPERATURE: 98.5 F | HEIGHT: 70 IN | OXYGEN SATURATION: 96 % | HEART RATE: 84 BPM

## 2024-08-29 DIAGNOSIS — M47.816 LUMBAR FACET ARTHROPATHY: ICD-10-CM

## 2024-08-29 DIAGNOSIS — M48.062 SPINAL STENOSIS OF LUMBAR REGION WITH NEUROGENIC CLAUDICATION: Primary | ICD-10-CM

## 2024-08-29 DIAGNOSIS — M62.838 MUSCLE SPASM: ICD-10-CM

## 2024-08-29 DIAGNOSIS — M54.16 LUMBAR RADICULITIS: ICD-10-CM

## 2024-08-29 PROCEDURE — 99213 OFFICE O/P EST LOW 20 MIN: CPT | Performed by: NURSE PRACTITIONER

## 2024-08-29 PROCEDURE — 1123F ACP DISCUSS/DSCN MKR DOCD: CPT | Performed by: NURSE PRACTITIONER

## 2024-08-29 RX ORDER — METHOCARBAMOL 500 MG/1
500 TABLET, FILM COATED ORAL 2 TIMES DAILY PRN
Qty: 180 TABLET | Refills: 1 | Status: SHIPPED | OUTPATIENT
Start: 2024-10-11 | End: 2025-04-09

## 2024-08-29 RX ORDER — GABAPENTIN 300 MG/1
300 CAPSULE ORAL 2 TIMES DAILY
Qty: 180 CAPSULE | Refills: 0 | Status: SHIPPED | OUTPATIENT
Start: 2024-09-22 | End: 2025-03-21

## 2024-08-29 NOTE — PROGRESS NOTES
Chief complaint   Chief Complaint   Patient presents with    Back Problem    Pain    Back Pain       History of Present Illness:  Kalen Rivas is a  69 y.o.  male who comes in today for follow-up of his low back pain.  He had a Worker's Comp. 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  That required surgeries by both Dr. Ha and then by Dr. Najera.  He continues to have back pain with radicular pain into his right lower extremity.  He is taking gabapentin 300 mg twice a day and Robaxin 500 mg twice a day which does seem to help.  He reports he will be undergoing a third knee revision November 18 with Dr. Ortiz.  He did see Dr. Najera for another surgical consult but he wants to wait until the knee surgeries are done because he may need it on the right also and that can affect his back.  He is now retired.  He is a non-smoker.  He denies fever bowel bladder dysfunction.      Physical Exam: The patient is a 69-year-old male well-developed well-nourished who is alert and oriented with a normal mood and affect.  He has a full weightbearing antalgic gait utilizing a single-point cane.  He has 4 out of 5 strength of his right lower extremity and out of 5 on the left but it is pain inhibited with his knee.  He has negative straight leg raise.  He has pain with hyperextension lumbar spine.      Assessment and Plan: This is a patient who has back pain with right leg pain due to stenosis, facet arthropathy and stenosis.  I have refilled his gabapentin and his Robaxin.  We will see him back in 4 months with Dr. Pena.    Kalen was seen today for back problem, pain and back pain.    Diagnoses and all orders for this visit:    Spinal stenosis of lumbar region with neurogenic claudication  -     gabapentin (NEURONTIN) 300 MG capsule; Take 1 capsule by mouth in the morning and at bedtime for 180 days. Take for neuropathic symptoms Max Daily Amount: 600 mg    Lumbar

## 2024-08-29 NOTE — PROGRESS NOTES
Kalen Rivas presents today for   Chief Complaint   Patient presents with    Back Problem    Pain    Back Pain       Is someone accompanying this pt? no    Is the patient using any DME equipment during OV? no    Depression Screening:       No data to display                Learning Assessment:  Failed to redirect to the Timeline version of the [employer] SmartLink.    Abuse Screening:       No data to display                Fall Risk  Failed to redirect to the Timeline version of the [employer] SmartLink.    OPIOID RISK TOOL  Failed to redirect to the Timeline version of the [employer] SmartLink.    Coordination of Care:  1. Have you been to the ER, urgent care clinic since your last visit? no  Hospitalized since your last visit? no    2. Have you seen or consulted any other health care providers outside of the [facility] since your last visit? no Include any pap smears or colon screening. no

## 2024-09-19 ENCOUNTER — OFFICE VISIT (OUTPATIENT)
Age: 69
End: 2024-09-19
Payer: COMMERCIAL

## 2024-09-19 VITALS — WEIGHT: 244.2 LBS | HEIGHT: 70 IN | BODY MASS INDEX: 34.96 KG/M2

## 2024-09-19 DIAGNOSIS — M65.332 TRIGGER FINGER, LEFT MIDDLE FINGER: ICD-10-CM

## 2024-09-19 DIAGNOSIS — M65.351 TRIGGER FINGER, RIGHT LITTLE FINGER: ICD-10-CM

## 2024-09-19 DIAGNOSIS — M65.312 TRIGGER THUMB, LEFT THUMB: Primary | ICD-10-CM

## 2024-09-19 DIAGNOSIS — M19.041 ARTHRITIS OF BOTH HANDS: ICD-10-CM

## 2024-09-19 DIAGNOSIS — M19.042 ARTHRITIS OF BOTH HANDS: ICD-10-CM

## 2024-09-19 PROCEDURE — G8417 CALC BMI ABV UP PARAM F/U: HCPCS

## 2024-09-19 PROCEDURE — 1036F TOBACCO NON-USER: CPT

## 2024-09-19 PROCEDURE — 20550 NJX 1 TENDON SHEATH/LIGAMENT: CPT

## 2024-09-19 PROCEDURE — 1123F ACP DISCUSS/DSCN MKR DOCD: CPT

## 2024-09-19 PROCEDURE — 3017F COLORECTAL CA SCREEN DOC REV: CPT

## 2024-09-19 PROCEDURE — 73130 X-RAY EXAM OF HAND: CPT

## 2024-09-19 PROCEDURE — 99213 OFFICE O/P EST LOW 20 MIN: CPT

## 2024-09-19 PROCEDURE — G8427 DOCREV CUR MEDS BY ELIG CLIN: HCPCS

## 2024-09-19 RX ORDER — LIDOCAINE HYDROCHLORIDE 10 MG/ML
1.5 INJECTION, SOLUTION INFILTRATION; PERINEURAL ONCE
Status: COMPLETED | OUTPATIENT
Start: 2024-09-19 | End: 2024-09-19

## 2024-09-19 RX ADMIN — LIDOCAINE HYDROCHLORIDE 1.5 ML: 10 INJECTION, SOLUTION INFILTRATION; PERINEURAL at 15:22

## 2024-09-30 NOTE — H&P
Impression:  Encounter Diagnoses     ICD-10-CM ICD-9-CM   1. Prostate cancer ([facility] [address] C61 185   2. Benign prostatic hyperplasia with lower urinary tract symptoms, symptom details unspecified N40.1 600.01   3. Erectile dysfunction, unspecified erectile dysfunction type N52.9 607.84     1 Clinical [id number] Sierra 3+4 Adenocarcinoma of the prostate. 5/6 cores positive involving 5-25% of cores. Biopsy performed on 8/15/2018 for a PSA of 5.3 ng/mL. TRUS vol of 35cc. 2 BPH/LUTS - minor bother w/ occasional urgency  3 ED - long standing issue likely secondary to BP meds. Pt does not wish to pursue option at this time. States he can get erections occasionally. Plan:  Consent orders and H&P done  Reviewed indications risks and benefits of prostatectomy  All questions answered  Script for Lovenox 40 mg q morning provided to pt  Advised pt to hold Lovenox inj 24 hours prior to surgery  Proceed as scheduled for DVP w/ BPLND on 11/12/2018. DISCUSSION:  Mr. Tu Moise and I discussed the risks benefits and alternatives of the proposed [employer] radical prostatectomy with / without bilateral pelvic lymph node dissection and he has selected to proceed as planned. I indicated that the risks include but are not limited to: infection, bleeding, potential need for blood transfusion, bowel/rectal injury, extremity neurapraxia, lymphocele, urine leak, vesicourethral anastamotic stricture, urinary incontinence, impotence, deep vein thrombosis, pulmonary embolis, myocardial infarction. Pt indicated understanding, questions were answered and he agrees to proceed. Patient's BMI is out of the normal parameters. Information about BMI was given and patient was advised to follow-up with their PCP for further management.   Chief Complaint   Patient presents with    Prostate Cancer        HPI:   Tu Moise is 70 y.o. yo  male who presents today preoperatively before DVP w/ BPLND on 11/12/2018 for newly diagnosed prostate cancer. PSA at the time of diagnosis was 5.31ng/ml. Digital rectal exam showed No palpable nodules  Transrectal ultrasound with Vol of 35 cc. The pathology showed:  Sierra 3+ 4 = 7 adenocarcinoma bilaterally - 8 of 12 cores positive    He reports urinary urgency and frequency. Good FOS. Not currently on any  medications. Denies hematuria, dysuria, weak stream, incontinence, and incomplete bladder emptying. Denies bone or back pain. Good appetite and stable weight. His erections are present, but long standing issues w/ poor erection quality.  Not a major concern at this time    No prior abdominal surgeries    PMH:  Past Medical History:   Diagnosis Date    Arthritis     Bleeding     Blood clot in the legs     Chronic pain     knee and shoulder    Esophageal reflux     GERD (gastroesophageal reflux disease)     Headache(784.0)     migraine    High cholesterol     Hypertension     Lower back pain 11/6/2010    Other chest pain     Personal history of venous thrombosis and embolism     Pure hypercholesterolemia     Right buttock pain 11/6/2010    Right foot pain     Rotator cuff tear     left-since 2010, worsened tear Young.    Spinal stenosis     Tendonitis, tibialis     anterior    Thromboembolus ([facility] [address]     3 after sx last one 2000    Total knee replacement status, left        Past Surgical History:   Procedure Laterality Date    FOOT/TOES SURGERY PROC UNLISTED      HX BACK SURGERY      HX KNEE REPLACEMENT Left     HX ORTHOPAEDIC  06-25-12    Right foot with excision of bursa and adipose tissue from fifth metatarsal base by Dr. Rainer Howe      lower back (1992 and 2000)    HX OTHER SURGICAL      left foot (2008)    HX OTHER SURGICAL      Retina repair     LAMINOTOMY      NERVE BLOCK       Current Meds:  Current Outpatient Prescriptions   Medication Sig Dispense Refill    raNITIdine (ZANTAC) 150 mg tablet TK 1 T PO HS  1    tamsulosin (FLOMAX) 0.4 mg capsule TAKE 1 CAPSULE BY MOUTH DAILY 30 Cap 0    labetalol (NORMODYNE) 100 mg tablet TAKE ONE TABLET BY MOUTH TWICE DAILY 180 Tab 0    ALPRAZolam (XANAX) 0.5 mg tablet Take one half(1/2) tab to one(1) tab by mouth at bedtime as needed for sleep 30 Tab 0    lansoprazole (PREVACID) 30 mg capsule TAKE 1 CAPSULE DAILY BEFOREBREAKFAST 90 Cap 3    warfarin (COUMADIN) 5 mg tablet TAKE 2 AND 1/2 TABLETS BY MOUTH DAILY OR AS DIRECTED 75 Tab 0    celecoxib (CELEBREX) 200 mg capsule Take 1 Cap by mouth two (2) times a day. (Patient taking differently: Take 200 mg by mouth daily. ) 180 Cap 0    butalbital-acetaminophen-caff (FIORICET) -40 mg per capsule Take 2 capsules every 8 hours as needed for headache 126 Cap 1    lisinopril-hydroCHLOROthiazide (PRINZIDE, ZESTORETIC) 20-12.5 mg per tablet TAKE 1 TABLET BY MOUTH DAILY 90 Tab 0    diclofenac (VOLTAREN) 1 % gel Apply 4 g to affected area four (4) times daily. Maximum 16 grams per joint per day. Dispense 5 100 gram tubes 5 Each 0    warfarin (COUMADIN) 3 mg tablet Or as directed 30 Tab 3    metaxalone (SKELAXIN) 800 mg tablet Take 1 Tab by mouth three (3) times daily. Indications: Muscle Spasm (Patient taking differently: Take 800 mg by mouth three (3) times daily as needed. Indications: Muscle Spasm) 90 Tab 2    montelukast (SINGULAIR) 10 mg tablet TAKE 1 TABLET BY MOUTH EVERY DAY (Patient taking differently: TAKE 1 TABLET BY MOUTH EVERY HS) 90 Tab 0    acetaminophen (TYLENOL) 500 mg tablet Take 1,000 mg by mouth every six (6) hours as needed for Pain. Allergies:   Allergies   Allergen Reactions    Amoxicillin Itching    Augmentin [Amoxicillin-Pot Clavulanate] Itching    Hibiclens [Chlorhexidine Gluconate] Itching    Penicillins Rash    Requip [Ropinirole] Nausea and Vomiting             SH:  Social History     Socioeconomic History    Marital status:      Spouse name: Not on file    Number of children: Not on file    Years of education: Not on file    Highest education level: Not on file   Social Needs    Financial resource strain: Not on file    Food insecurity - worry: Not on file    Food insecurity - inability: Not on file    Transportation needs - medical: Not on file   Med.ly needs - non-medical: Not on file   Occupational History    Not on file   Tobacco Use    Smoking status: Never Smoker    Smokeless tobacco: Never Used   Substance and Sexual Activity    Alcohol use: No    Drug use: No    Sexual activity: Not Currently   Other Topics Concern    Not on file   Social History Narrative    Not on file       FH:  Family History   Problem Relation Age of Onset   [facility] Arthritis-rheumatoid Mother     Dementia Mother     Heart Disease Father     Cancer Father     Hypertension Other     Cancer Brother        Review of Systems  Constitutional: Fever: No  Skin: Rash: No  HEENT: Hearing difficulty: No  Eyes: Blurred vision: No  Cardiovascular: Chest pain: No  Respiratory: Shortness of breath: No  Gastrointestinal: Nausea/vomiting: No  Musculoskeletal: Back pain: No  Neurological: Weakness: No  Psychological: Memory loss: No  Comments/additional findings:       Physical Exam:  Visit Vitals  /80   Ht 5' 10\" (1.778 m)   Wt 225 lb (102.1 kg)   BMI 32.28 kg/m²     Constitutional: WDWN, Pleasant and appropriate affect, No acute distress. CV:  No peripheral swelling noted, RRR  Respiratory: No respiratory distress or difficulties, CTAB  Abdomen:  No abdominal masses or tenderness. No CVA tenderness. No hernias noted.  Male:  deferred  Skin: No jaundice. Neuro/Psych:  Alert and oriented x 3. Affect appropriate. Lymphatic:   No enlarged inguinal lymph nodes.      Results for orders placed or performed during the hospital encounter of 11/03/18   CULTURE, URINE   Result Value Ref Range    Special Requests: NO SPECIAL REQUESTS      Culture result: NO GROWTH 1 DAY     CBC W/O DIFF   Result Value Ref Range    WBC 4.7 4.6 - 13.2 K/uL    RBC 4.13 (L) 4.70 - 5.50 M/uL    HGB 13.1 13.0 - 16.0 g/dL    HCT 39.0 36.0 - 48.0 %    MCV 94.4 74.0 - 97.0 FL    MCH 31.7 24.0 - 34.0 PG    MCHC 33.6 31.0 - 37.0 g/dL    RDW 13.3 11.6 - 14.5 %    PLATELET 630 271 - 668 K/uL    MPV 10.8 9.2 - 15.4 FL   METABOLIC PANEL, BASIC   Result Value Ref Range    Sodium 141 136 - 145 mmol/L    Potassium 3.9 3.5 - 5.5 mmol/L    Chloride 107 100 - 108 mmol/L    CO2 29 21 - 32 mmol/L    Anion gap 5 3.0 - 18 mmol/L    Glucose 115 (H) 74 - 99 mg/dL    BUN 12 7.0 - 18 MG/DL    Creatinine 0.91 0.6 - 1.3 MG/DL    BUN/Creatinine ratio 13 12 - 20      GFR est AA >60 >60 ml/min/1.73m2    GFR est non-AA >60 >60 ml/min/1.73m2    Calcium 8.4 (L) 8.5 - 10.1 MG/DL   URINALYSIS W/ RFLX MICROSCOPIC   Result Value Ref Range    Color YELLOW      Appearance CLEAR      Specific gravity 1.013 1.005 - 1.030      pH (UA) 6.0 5.0 - 8.0      Protein NEGATIVE  NEG mg/dL    Glucose NEGATIVE  NEG mg/dL    Ketone NEGATIVE  NEG mg/dL    Bilirubin NEGATIVE  NEG      Blood NEGATIVE  NEG      Urobilinogen 0.2 0.2 - 1.0 EU/dL    Nitrites NEGATIVE  NEG      Leukocyte Esterase NEGATIVE  NEG     EKG, 12 LEAD, INITIAL   Result Value Ref Range    Ventricular Rate 76 BPM    Atrial Rate 76 BPM    P-R Interval 180 ms    QRS Duration 94 ms    Q-T Interval 366 ms    QTC Calculation (Bezet) 411 ms    Calculated P Axis 53 degrees    Calculated R Axis 5 degrees    Calculated T Axis 63 degrees    Diagnosis       Normal sinus rhythm  Possible Left atrial enlargement  Inferior infarct , age undetermined  Abnormal ECG  When compared with ECG of 31-JAN-2018 11:29,  No significant change was found  Confirmed by Zaina Atkins ([id number]) on 11/4/2018 9:40:19 PM              Lab Results   Component Value Date/Time    Prostate Specific Ag 5.3 (H) 05/24/2018 04:52 PM    Prostate Specific Ag 3.0 01/18/2017 02:19 PM     Lab Results   Component Value Date/Time    Prostate Specific Ag 5.3 (H) 05/24/2018 04:52 PM    Prostate Specific Ag 3.0 01/18/2017 02:19 PM       Marilu Chinchilla MD   Urologic Oncologist  [facility] Professor of Urology  [facility]  Office [phone] Ext [phone]    CC: Durga Simpson MD    Medical documentation provided with the assistance of Eddie Carrasquillo medical scribe to Alexa Meneses MD on 11/7/2018. Date of Surgery Update:  Tawny Moreno was seen and examined. History and physical has been reviewed. The patient has been examined.  There have been no significant clinical changes since the completion of the originally dated History and Physical.    Signed By: Alexa Meneses MD     November 12, 2018 1:58 PM 61

## 2024-10-15 ENCOUNTER — TELEPHONE (OUTPATIENT)
Age: 69
End: 2024-10-15

## 2024-10-15 NOTE — TELEPHONE ENCOUNTER
----- Message from Dr. Darrian Schilling MD sent at 10/15/2024 10:00 AM EDT -----  Please inform patient regarding abnormal test findings.     Abnormal stress test  Follow up with me to discuss possible cardiac cath.     Thank you  SP

## 2024-10-15 NOTE — TELEPHONE ENCOUNTER
Unable to reach patient and leave a message. Patient needs an appt with Dr. Schilling to discuss abn nuc.

## 2024-10-23 ENCOUNTER — TRANSCRIBE ORDERS (OUTPATIENT)
Facility: HOSPITAL | Age: 69
End: 2024-10-23

## 2024-10-23 ENCOUNTER — HOSPITAL ENCOUNTER (OUTPATIENT)
Facility: HOSPITAL | Age: 69
Discharge: HOME | End: 2024-10-26
Payer: MEDICARE

## 2024-10-23 DIAGNOSIS — Z96.652 PAIN DUE TO TOTAL LEFT KNEE REPLACEMENT, INITIAL ENCOUNTER (HCC): ICD-10-CM

## 2024-10-23 DIAGNOSIS — Z96.652 PAIN DUE TO TOTAL LEFT KNEE REPLACEMENT, INITIAL ENCOUNTER (HCC): Primary | ICD-10-CM

## 2024-10-23 DIAGNOSIS — T84.84XA PAIN DUE TO TOTAL LEFT KNEE REPLACEMENT, INITIAL ENCOUNTER (HCC): Primary | ICD-10-CM

## 2024-10-23 DIAGNOSIS — T84.84XA PAIN DUE TO TOTAL LEFT KNEE REPLACEMENT, INITIAL ENCOUNTER (HCC): ICD-10-CM

## 2024-10-23 LAB
ALBUMIN SERPL-MCNC: 3.8 G/DL (ref 3.4–5)
ALBUMIN/GLOB SERPL: 1.3 (ref 0.8–1.7)
ALP SERPL-CCNC: 76 U/L (ref 45–117)
ALT SERPL-CCNC: 31 U/L (ref 16–61)
ANION GAP SERPL CALC-SCNC: 4 MMOL/L (ref 3–18)
APPEARANCE UR: CLEAR
APTT PPP: 30.3 SEC (ref 23–36.4)
AST SERPL-CCNC: 26 U/L (ref 10–38)
BASOPHILS # BLD: 0.1 K/UL (ref 0–0.1)
BASOPHILS NFR BLD: 1 % (ref 0–2)
BILIRUB SERPL-MCNC: 0.3 MG/DL (ref 0.2–1)
BILIRUB UR QL: NEGATIVE
BUN SERPL-MCNC: 13 MG/DL (ref 7–18)
BUN/CREAT SERPL: 13 (ref 12–20)
CALCIUM SERPL-MCNC: 9.8 MG/DL (ref 8.5–10.1)
CHLORIDE SERPL-SCNC: 106 MMOL/L (ref 100–111)
CO2 SERPL-SCNC: 30 MMOL/L (ref 21–32)
COLOR UR: YELLOW
CREAT SERPL-MCNC: 1.01 MG/DL (ref 0.6–1.3)
DIFFERENTIAL METHOD BLD: ABNORMAL
EKG ATRIAL RATE: 71 BPM
EKG DIAGNOSIS: NORMAL
EKG P AXIS: 60 DEGREES
EKG P-R INTERVAL: 192 MS
EKG Q-T INTERVAL: 382 MS
EKG QRS DURATION: 94 MS
EKG QTC CALCULATION (BAZETT): 415 MS
EKG R AXIS: 14 DEGREES
EKG T AXIS: 47 DEGREES
EKG VENTRICULAR RATE: 71 BPM
EOSINOPHIL # BLD: 0.2 K/UL (ref 0–0.4)
EOSINOPHIL NFR BLD: 3 % (ref 0–5)
ERYTHROCYTE [DISTWIDTH] IN BLOOD BY AUTOMATED COUNT: 12.7 % (ref 11.6–14.5)
ERYTHROCYTE [SEDIMENTATION RATE] IN BLOOD: 7 MM/HR (ref 0–20)
EST. AVERAGE GLUCOSE BLD GHB EST-MCNC: 120 MG/DL
GLOBULIN SER CALC-MCNC: 2.9 G/DL (ref 2–4)
GLUCOSE SERPL-MCNC: 105 MG/DL (ref 74–99)
GLUCOSE UR STRIP.AUTO-MCNC: NEGATIVE MG/DL
HBA1C MFR BLD: 5.8 % (ref 4.2–5.6)
HCT VFR BLD AUTO: 41.3 % (ref 36–48)
HGB BLD-MCNC: 14.2 G/DL (ref 13–16)
HGB UR QL STRIP: NEGATIVE
IMM GRANULOCYTES # BLD AUTO: 0 K/UL (ref 0–0.04)
IMM GRANULOCYTES NFR BLD AUTO: 1 % (ref 0–0.5)
INR PPP: 1.2 (ref 0.9–1.1)
KETONES UR QL STRIP.AUTO: NEGATIVE MG/DL
LEUKOCYTE ESTERASE UR QL STRIP.AUTO: NEGATIVE
LYMPHOCYTES # BLD: 1.2 K/UL (ref 0.9–3.6)
LYMPHOCYTES NFR BLD: 18 % (ref 21–52)
MCH RBC QN AUTO: 33.6 PG (ref 24–34)
MCHC RBC AUTO-ENTMCNC: 34.4 G/DL (ref 31–37)
MCV RBC AUTO: 97.9 FL (ref 78–100)
MONOCYTES # BLD: 0.8 K/UL (ref 0.05–1.2)
MONOCYTES NFR BLD: 12 % (ref 3–10)
NEUTS SEG # BLD: 4.2 K/UL (ref 1.8–8)
NEUTS SEG NFR BLD: 65 % (ref 40–73)
NITRITE UR QL STRIP.AUTO: NEGATIVE
NRBC # BLD: 0 K/UL (ref 0–0.01)
NRBC BLD-RTO: 0 PER 100 WBC
PH UR STRIP: 6.5 (ref 5–8)
PLATELET # BLD AUTO: 167 K/UL (ref 135–420)
PMV BLD AUTO: 10.6 FL (ref 9.2–11.8)
POTASSIUM SERPL-SCNC: 4.4 MMOL/L (ref 3.5–5.5)
PROT SERPL-MCNC: 6.7 G/DL (ref 6.4–8.2)
PROT UR STRIP-MCNC: NEGATIVE MG/DL
PROTHROMBIN TIME: 15 SEC (ref 11.9–14.9)
RBC # BLD AUTO: 4.22 M/UL (ref 4.35–5.65)
SODIUM SERPL-SCNC: 140 MMOL/L (ref 136–145)
SP GR UR REFRACTOMETRY: 1.02 (ref 1–1.03)
UROBILINOGEN UR QL STRIP.AUTO: 0.2 EU/DL (ref 0.2–1)
WBC # BLD AUTO: 6.5 K/UL (ref 4.6–13.2)

## 2024-10-23 PROCEDURE — 81003 URINALYSIS AUTO W/O SCOPE: CPT

## 2024-10-23 PROCEDURE — 83036 HEMOGLOBIN GLYCOSYLATED A1C: CPT

## 2024-10-23 PROCEDURE — 93005 ELECTROCARDIOGRAM TRACING: CPT

## 2024-10-23 PROCEDURE — 85730 THROMBOPLASTIN TIME PARTIAL: CPT

## 2024-10-23 PROCEDURE — 85652 RBC SED RATE AUTOMATED: CPT

## 2024-10-23 PROCEDURE — 36415 COLL VENOUS BLD VENIPUNCTURE: CPT

## 2024-10-23 PROCEDURE — 85610 PROTHROMBIN TIME: CPT

## 2024-10-23 PROCEDURE — 80053 COMPREHEN METABOLIC PANEL: CPT

## 2024-10-23 PROCEDURE — 85025 COMPLETE CBC W/AUTO DIFF WBC: CPT

## 2024-10-24 LAB
BACTERIA SPEC CULT: NORMAL
BACTERIA SPEC CULT: NORMAL
SERVICE CMNT-IMP: NORMAL

## 2024-11-01 ENCOUNTER — HOSPITAL ENCOUNTER (OUTPATIENT)
Facility: HOSPITAL | Age: 69
Discharge: HOME | End: 2024-11-04
Payer: MEDICARE

## 2024-11-01 ENCOUNTER — OFFICE VISIT (OUTPATIENT)
Age: 69
End: 2024-11-01

## 2024-11-01 VITALS
SYSTOLIC BLOOD PRESSURE: 140 MMHG | DIASTOLIC BLOOD PRESSURE: 78 MMHG | WEIGHT: 242 LBS | HEIGHT: 70 IN | HEART RATE: 83 BPM | BODY MASS INDEX: 34.65 KG/M2 | OXYGEN SATURATION: 94 %

## 2024-11-01 DIAGNOSIS — Z01.818 PRE-OP EVALUATION: ICD-10-CM

## 2024-11-01 DIAGNOSIS — R06.02 SHORTNESS OF BREATH: ICD-10-CM

## 2024-11-01 DIAGNOSIS — I10 ESSENTIAL HYPERTENSION WITH GOAL BLOOD PRESSURE LESS THAN 140/90: Primary | ICD-10-CM

## 2024-11-01 DIAGNOSIS — E78.00 PURE HYPERCHOLESTEROLEMIA: ICD-10-CM

## 2024-11-01 DIAGNOSIS — I10 ESSENTIAL HYPERTENSION WITH GOAL BLOOD PRESSURE LESS THAN 140/90: ICD-10-CM

## 2024-11-01 LAB
ANION GAP SERPL CALC-SCNC: 7 MMOL/L (ref 3–18)
BUN SERPL-MCNC: 15 MG/DL (ref 7–18)
BUN/CREAT SERPL: 16 (ref 12–20)
CALCIUM SERPL-MCNC: 9.8 MG/DL (ref 8.5–10.1)
CHLORIDE SERPL-SCNC: 105 MMOL/L (ref 100–111)
CO2 SERPL-SCNC: 26 MMOL/L (ref 21–32)
CREAT SERPL-MCNC: 0.93 MG/DL (ref 0.6–1.3)
ERYTHROCYTE [DISTWIDTH] IN BLOOD BY AUTOMATED COUNT: 12.6 % (ref 11.6–14.5)
GLUCOSE SERPL-MCNC: 100 MG/DL (ref 74–99)
HCT VFR BLD AUTO: 43.1 % (ref 36–48)
HGB BLD-MCNC: 14.5 G/DL (ref 13–16)
INR PPP: 1.2 (ref 0.9–1.1)
MCH RBC QN AUTO: 33.2 PG (ref 24–34)
MCHC RBC AUTO-ENTMCNC: 33.6 G/DL (ref 31–37)
MCV RBC AUTO: 98.6 FL (ref 78–100)
NRBC # BLD: 0 K/UL (ref 0–0.01)
NRBC BLD-RTO: 0 PER 100 WBC
PLATELET # BLD AUTO: 180 K/UL (ref 135–420)
PMV BLD AUTO: 11 FL (ref 9.2–11.8)
POTASSIUM SERPL-SCNC: 4.1 MMOL/L (ref 3.5–5.5)
PROTHROMBIN TIME: 15.1 SEC (ref 11.9–14.9)
RBC # BLD AUTO: 4.37 M/UL (ref 4.35–5.65)
SODIUM SERPL-SCNC: 138 MMOL/L (ref 136–145)
WBC # BLD AUTO: 5.9 K/UL (ref 4.6–13.2)

## 2024-11-01 PROCEDURE — 36415 COLL VENOUS BLD VENIPUNCTURE: CPT

## 2024-11-01 PROCEDURE — 85027 COMPLETE CBC AUTOMATED: CPT

## 2024-11-01 PROCEDURE — 80048 BASIC METABOLIC PNL TOTAL CA: CPT

## 2024-11-01 PROCEDURE — 85610 PROTHROMBIN TIME: CPT

## 2024-11-01 RX ORDER — APIXABAN 5 MG/1
5 TABLET, FILM COATED ORAL 2 TIMES DAILY
COMMUNITY
Start: 2024-10-07

## 2024-11-01 NOTE — PATIENT INSTRUCTIONS
Heart Catheterization Instructions    Patient’s Name:  Kalen Rivas    You are scheduled to have a left heart cath on 11/6/24  at Cath lab time.    Please go to [facility] and park in the outpatient parking lot that is located around to the back of the hospital and enter through the [employer] building.  Once you enter through the [employer] check in with the  there. The  will either give you directions or assist you in getting to the cath holding area.          You are not to eat or drink anything after midnight the night before your procedure. Small sips of water to take your medications is ok.     If you are diabetic, do not take your insulin/sugar pill the morning of the procedure.    MEDICATION INSTRUCTIONS:   Please take your morning medications with the following special instructions:    [x]          Please make sure to take your Blood pressure medication : Lisinopril-HCTZ. HOLD Eliquis 48 hours prior to procedure.    We encourage families to wait in the waiting room on the first floor while the procedure is being done.  The Doctor will come out and talk with you as soon as the procedure is over.    There is the possibility that you may spend the night in the hospital, depending on the results of the procedure.  This will be determined after the procedure is done.  If angioplasty or stent is planned, you will stay at least one day.    If you or your family have any questions, please call our office Monday -Friday, 9:00 a.m.-4:30 p.m.,  At [phone], and ask to speak to one of the nurses.

## 2024-11-01 NOTE — PROGRESS NOTES
[facility]    Kalen Rivas is 69 y.o. male     Patient is here today for follow-up appointment  Denies prior history of MI or CHF  Sent to me for evaluation of hypertension and some shortness of breath and epigastric chest discomfort  NST in 2024 showed no perfusion defect however TID was elevated at 1.27    Patient is here today for follow-up appointment.  Continues to have some dyspnea on exertion.  He continues to have significant left knee pain.  Some epigastric discomfort randomly and some lower chest discomfort sometimes at rest sometimes with exertion.    Past Medical History:   Diagnosis Date    Arthritis     DVT (deep venous thrombosis) ([facility]) 2000    POST BACK SX. 2- LEFT LEG    DVT (deep venous thrombosis) ([facility]) 1992    post sx.  R LEG    GERD (gastroesophageal reflux disease)     Hypertension     Prostate cancer ([facility]) 2018    Pure hypercholesterolemia     Spinal stenosis        Review of Systems:  Cardiac symptoms as noted above in HPI. All others negative.    Current Outpatient Medications   Medication Sig    ELIQUIS 5 MG TABS tablet Take 1 tablet by mouth 2 times daily    gabapentin (NEURONTIN) 300 MG capsule Take 1 capsule by mouth in the morning and at bedtime for 180 days. Take for neuropathic symptoms Max Daily Amount: 600 mg    methocarbamol (ROBAXIN) 500 MG tablet Take 1 tablet by mouth 2 times daily as needed (Lumbar Pain)    oxyCODONE-acetaminophen (PERCOCET) 5-325 MG per tablet     traMADol (ULTRAM) 50 MG tablet Haven't picked up yet    Probiotic Product (PROBIOTIC DAILY) CAPS Take 1 capsule by mouth Daily with supper    Cholecalciferol (VITAMIN D3) 125 MCG (5000 UT) TABS Take 1 tablet by mouth daily    zinc 50 MG TABS tablet Take 1 tablet by mouth daily    ezetimibe (ZETIA) 10 MG tablet Take 1 tablet by mouth daily    acetaminophen (TYLENOL) 500 MG tablet Take 2 tablets by mouth every 6 hours as needed    ALPRAZolam (XANAX)  Alert and oriented, no focal deficits, no motor or sensory deficits.

## 2024-11-04 NOTE — FLOWSHEET NOTE
Called to verify arrival time of 1030 for [facility] procedure on 11/6. Pre-procedure instructions verified including NPO after midnight and which meds to take and/or hold. All questions answered, patient's spouse  verbalized understanding.

## 2024-11-06 ENCOUNTER — HOSPITAL ENCOUNTER (OUTPATIENT)
Facility: HOSPITAL | Age: 69
Setting detail: OUTPATIENT SURGERY
Discharge: HOME | End: 2024-11-06
Attending: INTERNAL MEDICINE | Admitting: INTERNAL MEDICINE
Payer: MEDICARE

## 2024-11-06 VITALS
HEIGHT: 70 IN | SYSTOLIC BLOOD PRESSURE: 146 MMHG | OXYGEN SATURATION: 94 % | RESPIRATION RATE: 14 BRPM | DIASTOLIC BLOOD PRESSURE: 83 MMHG | TEMPERATURE: 98.3 F | WEIGHT: 242 LBS | HEART RATE: 73 BPM | BODY MASS INDEX: 34.65 KG/M2

## 2024-11-06 DIAGNOSIS — R93.1 ABNORMAL ECHOCARDIOGRAM: ICD-10-CM

## 2024-11-06 LAB
ACT BLD: 214 SECS (ref 79–138)
ACT BLD: 220 SECS (ref 79–138)
ACT BLD: 232 SECS (ref 79–138)
ECHO BSA: 2.33 M2

## 2024-11-06 PROCEDURE — C1769 GUIDE WIRE: HCPCS | Performed by: INTERNAL MEDICINE

## 2024-11-06 PROCEDURE — C9600 PERC DRUG-EL COR STENT SING: HCPCS | Performed by: INTERNAL MEDICINE

## 2024-11-06 PROCEDURE — C1725 CATH, TRANSLUMIN NON-LASER: HCPCS | Performed by: INTERNAL MEDICINE

## 2024-11-06 PROCEDURE — C1894 INTRO/SHEATH, NON-LASER: HCPCS | Performed by: INTERNAL MEDICINE

## 2024-11-06 PROCEDURE — 85347 COAGULATION TIME ACTIVATED: CPT

## 2024-11-06 PROCEDURE — 7100000010 HC PHASE II RECOVERY - FIRST 15 MIN: Performed by: INTERNAL MEDICINE

## 2024-11-06 PROCEDURE — C1874 STENT, COATED/COV W/DEL SYS: HCPCS | Performed by: INTERNAL MEDICINE

## 2024-11-06 PROCEDURE — 92928 PRQ TCAT PLMT NTRAC ST 1 LES: CPT | Performed by: INTERNAL MEDICINE

## 2024-11-06 PROCEDURE — 93458 L HRT ARTERY/VENTRICLE ANGIO: CPT | Performed by: INTERNAL MEDICINE

## 2024-11-06 PROCEDURE — C1887 CATHETER, GUIDING: HCPCS | Performed by: INTERNAL MEDICINE

## 2024-11-06 PROCEDURE — 99152 MOD SED SAME PHYS/QHP 5/>YRS: CPT | Performed by: INTERNAL MEDICINE

## 2024-11-06 PROCEDURE — 92929 PR PRQ TRLUML CORONARY STENT W/ANGIO ADDL ART/BRNCH: CPT | Performed by: INTERNAL MEDICINE

## 2024-11-06 PROCEDURE — C1713 ANCHOR/SCREW BN/BN,TIS/BN: HCPCS | Performed by: INTERNAL MEDICINE

## 2024-11-06 PROCEDURE — 6360000002 HC RX W HCPCS: Performed by: INTERNAL MEDICINE

## 2024-11-06 PROCEDURE — 99153 MOD SED SAME PHYS/QHP EA: CPT | Performed by: INTERNAL MEDICINE

## 2024-11-06 PROCEDURE — 93005 ELECTROCARDIOGRAM TRACING: CPT | Performed by: INTERNAL MEDICINE

## 2024-11-06 PROCEDURE — 2500000003 HC RX 250 WO HCPCS: Performed by: INTERNAL MEDICINE

## 2024-11-06 PROCEDURE — 76000 FLUOROSCOPY <1 HR PHYS/QHP: CPT | Performed by: INTERNAL MEDICINE

## 2024-11-06 PROCEDURE — 2709999900 HC NON-CHARGEABLE SUPPLY: Performed by: INTERNAL MEDICINE

## 2024-11-06 PROCEDURE — 6360000004 HC RX CONTRAST MEDICATION: Performed by: INTERNAL MEDICINE

## 2024-11-06 PROCEDURE — 6370000000 HC RX 637 (ALT 250 FOR IP): Performed by: INTERNAL MEDICINE

## 2024-11-06 DEVICE — STENT ONYXNG20015UX ONYX 2.00X15RX
Type: IMPLANTABLE DEVICE | Status: FUNCTIONAL
Brand: ONYX FRONTIER™

## 2024-11-06 DEVICE — STENT ONYXNG22526UX ONYX 2.25X26RX
Type: IMPLANTABLE DEVICE | Status: FUNCTIONAL
Brand: ONYX FRONTIER™

## 2024-11-06 RX ORDER — SODIUM CHLORIDE 9 MG/ML
INJECTION, SOLUTION INTRAVENOUS PRN
Status: DISCONTINUED | OUTPATIENT
Start: 2024-11-06 | End: 2024-11-06 | Stop reason: HOSPADM

## 2024-11-06 RX ORDER — IOPAMIDOL 612 MG/ML
INJECTION, SOLUTION INTRAVASCULAR PRN
Status: DISCONTINUED | OUTPATIENT
Start: 2024-11-06 | End: 2024-11-06 | Stop reason: HOSPADM

## 2024-11-06 RX ORDER — HEPARIN SODIUM 1000 [USP'U]/ML
INJECTION, SOLUTION INTRAVENOUS; SUBCUTANEOUS PRN
Status: DISCONTINUED | OUTPATIENT
Start: 2024-11-06 | End: 2024-11-06 | Stop reason: HOSPADM

## 2024-11-06 RX ORDER — IODIXANOL 320 MG/ML
INJECTION, SOLUTION INTRAVASCULAR PRN
Status: DISCONTINUED | OUTPATIENT
Start: 2024-11-06 | End: 2024-11-06 | Stop reason: HOSPADM

## 2024-11-06 RX ORDER — CLOPIDOGREL BISULFATE 75 MG/1
75 TABLET ORAL DAILY
Qty: 30 TABLET | Refills: 0 | Status: SHIPPED | OUTPATIENT
Start: 2024-11-06

## 2024-11-06 RX ORDER — MIDAZOLAM HYDROCHLORIDE 2 MG/2ML
INJECTION, SOLUTION INTRAMUSCULAR; INTRAVENOUS PRN
Status: DISCONTINUED | OUTPATIENT
Start: 2024-11-06 | End: 2024-11-06 | Stop reason: HOSPADM

## 2024-11-06 RX ORDER — ASPIRIN 81 MG/1
81 TABLET ORAL DAILY
Qty: 30 TABLET | Refills: 0 | Status: SHIPPED | OUTPATIENT
Start: 2024-11-06

## 2024-11-06 RX ORDER — ASPIRIN 81 MG/1
81 TABLET, CHEWABLE ORAL ONCE
Status: COMPLETED | OUTPATIENT
Start: 2024-11-06 | End: 2024-11-06

## 2024-11-06 RX ORDER — VERAPAMIL HYDROCHLORIDE 2.5 MG/ML
INJECTION, SOLUTION INTRAVENOUS PRN
Status: DISCONTINUED | OUTPATIENT
Start: 2024-11-06 | End: 2024-11-06 | Stop reason: HOSPADM

## 2024-11-06 RX ORDER — LIDOCAINE HYDROCHLORIDE 10 MG/ML
INJECTION, SOLUTION EPIDURAL; INFILTRATION; INTRACAUDAL; PERINEURAL PRN
Status: DISCONTINUED | OUTPATIENT
Start: 2024-11-06 | End: 2024-11-06 | Stop reason: HOSPADM

## 2024-11-06 RX ORDER — NITROGLYCERIN 20 MG/100ML
INJECTION INTRAVENOUS CONTINUOUS PRN
Status: COMPLETED | OUTPATIENT
Start: 2024-11-06 | End: 2024-11-06

## 2024-11-06 RX ORDER — FENTANYL CITRATE 50 UG/ML
INJECTION, SOLUTION INTRAMUSCULAR; INTRAVENOUS PRN
Status: DISCONTINUED | OUTPATIENT
Start: 2024-11-06 | End: 2024-11-06 | Stop reason: HOSPADM

## 2024-11-06 RX ORDER — CLOPIDOGREL BISULFATE 75 MG/1
75 TABLET ORAL DAILY
Qty: 30 TABLET | Refills: 3 | Status: SHIPPED | OUTPATIENT
Start: 2024-11-29

## 2024-11-06 RX ADMIN — ASPIRIN 81 MG CHEWABLE TABLET 81 MG: 81 TABLET CHEWABLE at 11:39

## 2024-11-06 NOTE — PROGRESS NOTES
Pre-Discharge Checklist for Same Day Discharge Post PCI      Confirm that loading dose of P2Y12i has been administered.  YES    Confirm the patient has received prescriptions for at least 30 days of P2Y12i.  YES    Confirm prescription for aspirin and statin.  YES    Confirm referral to cardiac rehab.  YES    Charge nurse plans on calling patient the day after discharge.  YES    The cath holding nurse has provided education to patient on how to monitor access site, and the importance of taking DAPT as prescribed and the specific risks of premature discontinuation.  YES    The cath holding nurse has provided the patient with an emergency number to call.  YES    The cath holding nurse has scheduled a follow up appointment.  YES

## 2024-11-06 NOTE — H&P
Please see clinic note from last week as below for detail.  I saw and examined patient and confirmed above.  No interval change.  Labs reviewed.   Procedure explained to patient and all risk and benefit discussed with patient.  Risk, benefit, complication of LHC and possible PCI ( including but not limited to bleeding, infection, heart failure, stroke, MI, emergent bypass surgery, kidney failure, dialysis and death ) were discussed with patient and willing to proceed with procedure.  Proceed as planned.    History and physical has been reviewed. There have been no significant clinical changes since the completion of the originally dated History and Physical.  Will be using moderate sedation.    ------------------------------------------------------------------------------------------------------------------

## 2024-11-06 NOTE — DISCHARGE INSTRUCTIONS
Learning About Cardiac Rehabilitation  What is it?     Cardiac rehabilitation (rehab) is a program for people who have a heart problem. It teaches you how to be more active and have a heart-healthy lifestyle. This can lead to a stronger heart and better health.  Why is cardiac rehab done?  Cardiac rehab may help you to:  Have better overall health and a better quality of life.  Lower your risk of a heart attack or dying from heart disease.  Recover well after a procedure or surgery.  Stay out of the hospital.  Manage your symptoms. These may include chest pain, shortness of breath, and fatigue.  Manage other health problems. These include diabetes, high blood pressure, and high cholesterol.  Other benefits  Rehab may also help you to:  Go back to work safely and sooner.  Manage the emotional effects of having heart disease.  Get support from your rehab team and other patients in rehab.  Have more energy and return to your usual activities.  Resume your sex life.  Have a heart-healthy lifestyle. This includes being active, eating healthy foods, staying at a healthy weight or losing weight, and not smoking.  What are some types of cardiac rehab?  Cardiac rehab programs can be done in a hospital, an outpatient facility, or your home.  Cardiac rehab usually includes:  Close supervision. This happens during the early part of your exercise program.  Education and counseling for you and your family and friends. This can help you build healthy habits. These habits will lower your risk of having more heart problems.  Helping you prepare to get back to work and the activities you enjoyed before your heart problems. You may need to adjust your work or leisure activities.  Taking care of your emotional health. You can get help for depression and improve your emotional well-being.  Making a plan to help you start a safe exercise program at home.  What does a cardiac rehab team do?  In cardiac rehab, you work with a team of  emergencies, dial 911.   Patient armband removed and shredded

## 2024-11-06 NOTE — PROGRESS NOTES
Right wrist band removed, no bleeding or swelling. Sterile hemostatic dressing applied. Normal radial pulse, normal distal circulation and neuro check. Safety instructions reviewed with the patient.   Safety splint applied to right lower arm.

## 2024-11-06 NOTE — PRE SEDATION
Sedation Plan  ASA: class 2 - patient with mild systemic disease     Mallampati class: II - soft palate, uvula, fauces visible.    Sedation plan: local anesthesia, moderate (conscious sedation) and minimal sedation    Risks, benefits, and alternatives discussed with spouse and patient.  Use of blood products discussed with patient and spouse who consented to blood products.

## 2024-11-06 NOTE — PROGRESS NOTES
Received from [facility], ambulatory with cane, c/o chronic back and neck pain.  Wife is present and will transport home.

## 2024-11-07 ENCOUNTER — TELEPHONE (OUTPATIENT)
Age: 69
End: 2024-11-07

## 2024-11-07 DIAGNOSIS — Z95.5 STENTED CORONARY ARTERY: Primary | ICD-10-CM

## 2024-11-07 LAB
EKG ATRIAL RATE: 75 BPM
EKG DIAGNOSIS: NORMAL
EKG P AXIS: 36 DEGREES
EKG P-R INTERVAL: 200 MS
EKG Q-T INTERVAL: 376 MS
EKG QRS DURATION: 84 MS
EKG QTC CALCULATION (BAZETT): 419 MS
EKG R AXIS: 1 DEGREES
EKG T AXIS: 58 DEGREES
EKG VENTRICULAR RATE: 75 BPM

## 2024-11-07 PROCEDURE — 93010 ELECTROCARDIOGRAM REPORT: CPT | Performed by: INTERNAL MEDICINE

## 2024-11-08 ENCOUNTER — TELEPHONE (OUTPATIENT)
Age: 69
End: 2024-11-08

## 2024-11-08 NOTE — TELEPHONE ENCOUNTER
Pt called back with concerns of his bruising post procedure. He said he was holding his grandbaby and when the baby got up his bruising had gotten worse with the appearance of bubbles. He did mention that the bubbles are starting to disappear.    Pt has concerns with being on asa, plavix and eliquis. He wants to know if he should be taking all three long term or if any will be discontinued.

## 2024-11-08 NOTE — TELEPHONE ENCOUNTER
Patient calling in to let us know that on Wednesday after he got home from his cath, and started having a rash and itching on his face and upper body. He has been taking Benadryl and it has started to clear up. They wanted to make us aware.

## 2024-11-18 ENCOUNTER — TELEPHONE (OUTPATIENT)
Age: 69
End: 2024-11-18

## 2024-11-18 NOTE — TELEPHONE ENCOUNTER
Patient states that he has been having SOB since [employer] Sunday and when he is exerting himself. He had recent cath done and 2 stents placed. No other cardiac symptoms were reported.

## 2024-11-27 NOTE — TELEPHONE ENCOUNTER
Patient would like some clarification about how much medicine he needs to take . please call [phone] Request latest notes and labs from nephrology Dr Angel [facility]

## 2024-12-13 ENCOUNTER — OFFICE VISIT (OUTPATIENT)
Age: 69
End: 2024-12-13
Payer: MEDICARE

## 2024-12-13 VITALS
WEIGHT: 242 LBS | HEIGHT: 70 IN | SYSTOLIC BLOOD PRESSURE: 140 MMHG | BODY MASS INDEX: 34.65 KG/M2 | OXYGEN SATURATION: 94 % | HEART RATE: 92 BPM | DIASTOLIC BLOOD PRESSURE: 80 MMHG

## 2024-12-13 DIAGNOSIS — I10 ESSENTIAL HYPERTENSION WITH GOAL BLOOD PRESSURE LESS THAN 140/90: Primary | ICD-10-CM

## 2024-12-13 DIAGNOSIS — I25.10 CORONARY ARTERY DISEASE DUE TO LIPID RICH PLAQUE: ICD-10-CM

## 2024-12-13 DIAGNOSIS — I25.83 CORONARY ARTERY DISEASE DUE TO LIPID RICH PLAQUE: ICD-10-CM

## 2024-12-13 DIAGNOSIS — E78.00 PURE HYPERCHOLESTEROLEMIA: ICD-10-CM

## 2024-12-13 PROCEDURE — 3017F COLORECTAL CA SCREEN DOC REV: CPT | Performed by: INTERNAL MEDICINE

## 2024-12-13 PROCEDURE — G8484 FLU IMMUNIZE NO ADMIN: HCPCS | Performed by: INTERNAL MEDICINE

## 2024-12-13 PROCEDURE — G8417 CALC BMI ABV UP PARAM F/U: HCPCS | Performed by: INTERNAL MEDICINE

## 2024-12-13 PROCEDURE — 1126F AMNT PAIN NOTED NONE PRSNT: CPT | Performed by: INTERNAL MEDICINE

## 2024-12-13 PROCEDURE — 3077F SYST BP >= 140 MM HG: CPT | Performed by: INTERNAL MEDICINE

## 2024-12-13 PROCEDURE — G8427 DOCREV CUR MEDS BY ELIG CLIN: HCPCS | Performed by: INTERNAL MEDICINE

## 2024-12-13 PROCEDURE — 1036F TOBACCO NON-USER: CPT | Performed by: INTERNAL MEDICINE

## 2024-12-13 PROCEDURE — 99214 OFFICE O/P EST MOD 30 MIN: CPT | Performed by: INTERNAL MEDICINE

## 2024-12-13 PROCEDURE — 1123F ACP DISCUSS/DSCN MKR DOCD: CPT | Performed by: INTERNAL MEDICINE

## 2024-12-13 PROCEDURE — 1159F MED LIST DOCD IN RCRD: CPT | Performed by: INTERNAL MEDICINE

## 2024-12-13 PROCEDURE — 3079F DIAST BP 80-89 MM HG: CPT | Performed by: INTERNAL MEDICINE

## 2024-12-13 RX ORDER — CLOPIDOGREL BISULFATE 75 MG/1
75 TABLET ORAL DAILY
Qty: 30 TABLET | Refills: 5 | Status: SHIPPED | OUTPATIENT
Start: 2024-12-13

## 2024-12-13 RX ORDER — CLOPIDOGREL BISULFATE 75 MG/1
75 TABLET ORAL DAILY
Qty: 30 TABLET | Refills: 0 | Status: SHIPPED | OUTPATIENT
Start: 2024-12-13 | End: 2024-12-13

## 2024-12-13 ASSESSMENT — PATIENT HEALTH QUESTIONNAIRE - PHQ9
1. LITTLE INTEREST OR PLEASURE IN DOING THINGS: NOT AT ALL
2. FEELING DOWN, DEPRESSED OR HOPELESS: NOT AT ALL
SUM OF ALL RESPONSES TO PHQ QUESTIONS 1-9: 0
SUM OF ALL RESPONSES TO PHQ9 QUESTIONS 1 & 2: 0
SUM OF ALL RESPONSES TO PHQ QUESTIONS 1-9: 0

## 2024-12-13 NOTE — PROGRESS NOTES
[facility]    Kalen Rivas is 69 y.o. male     Patient is here today for follow-up appointment  Denies prior history of MI or CHF  Sent to me for evaluation of hypertension and some shortness of breath and epigastric chest discomfort  NST in 2024 showed no perfusion defect however TID was elevated at 1.27  C in 2024: PCI and stenting of OM2 90-95% stenosis using 2.0 x 15 mm MILA  PCI stenting of D1 long 75% stenosis using 2.25 x 26 mm MILA    Patient is feeling much better since last time.  Mild dyspnea.  Overall dyspnea has improved.  No chest pain or chest tightness.  He feels much better overall.  No presyncope or syncope.  Continues to have significant joint pain causing some unstable today.  No fall.    Past Medical History:   Diagnosis Date    Arthritis     CAD (coronary artery disease)     DVT (deep venous thrombosis) ([facility]) 2000    POST BACK SX. 2- LEFT LEG    DVT (deep venous thrombosis) ([facility]) 1992    post sx.  R LEG    Factor V deficiency ([facility])     GERD (gastroesophageal reflux disease)     Hypertension     Prostate cancer ([facility]) 2018    Pure hypercholesterolemia     Spinal stenosis        Review of Systems:  Cardiac symptoms as noted above in HPI. All others negative.    Current Outpatient Medications   Medication Sig    clopidogrel (PLAVIX) 75 MG tablet Take 1 tablet by mouth daily    aspirin 81 MG EC tablet Take 1 tablet by mouth daily    ELIQUIS 5 MG TABS tablet Take 1 tablet by mouth 2 times daily    gabapentin (NEURONTIN) 300 MG capsule Take 1 capsule by mouth in the morning and at bedtime for 180 days. Take for neuropathic symptoms Max Daily Amount: 600 mg    methocarbamol (ROBAXIN) 500 MG tablet Take 1 tablet by mouth 2 times daily as needed (Lumbar Pain)    Cholecalciferol (VITAMIN D3) 125 MCG (5000 UT) TABS Take 1 tablet by mouth daily    zinc 50 MG TABS tablet Take 1 tablet by mouth daily    ezetimibe (ZETIA) 10 MG tablet Take

## 2024-12-19 ENCOUNTER — OFFICE VISIT (OUTPATIENT)
Age: 69
End: 2024-12-19
Payer: OTHER GOVERNMENT

## 2024-12-19 VITALS — BODY MASS INDEX: 34.36 KG/M2 | WEIGHT: 240 LBS | TEMPERATURE: 98 F | HEIGHT: 70 IN

## 2024-12-19 DIAGNOSIS — M48.062 SPINAL STENOSIS OF LUMBAR REGION WITH NEUROGENIC CLAUDICATION: ICD-10-CM

## 2024-12-19 DIAGNOSIS — Z95.5 H/O HEART ARTERY STENT: ICD-10-CM

## 2024-12-19 DIAGNOSIS — I25.85 CHRONIC CORONARY MICROVASCULAR DYSFUNCTION: ICD-10-CM

## 2024-12-19 DIAGNOSIS — Z79.01 LONG TERM (CURRENT) USE OF ANTICOAGULANTS: ICD-10-CM

## 2024-12-19 DIAGNOSIS — M47.816 LUMBAR FACET ARTHROPATHY: ICD-10-CM

## 2024-12-19 DIAGNOSIS — M54.16 LUMBAR RADICULITIS: Primary | ICD-10-CM

## 2024-12-19 DIAGNOSIS — M62.838 MUSCLE SPASM: ICD-10-CM

## 2024-12-19 PROCEDURE — 1125F AMNT PAIN NOTED PAIN PRSNT: CPT | Performed by: PHYSICAL MEDICINE & REHABILITATION

## 2024-12-19 PROCEDURE — 1160F RVW MEDS BY RX/DR IN RCRD: CPT | Performed by: PHYSICAL MEDICINE & REHABILITATION

## 2024-12-19 PROCEDURE — 1123F ACP DISCUSS/DSCN MKR DOCD: CPT | Performed by: PHYSICAL MEDICINE & REHABILITATION

## 2024-12-19 PROCEDURE — 1159F MED LIST DOCD IN RCRD: CPT | Performed by: PHYSICAL MEDICINE & REHABILITATION

## 2024-12-19 PROCEDURE — 99214 OFFICE O/P EST MOD 30 MIN: CPT | Performed by: PHYSICAL MEDICINE & REHABILITATION

## 2024-12-19 RX ORDER — GABAPENTIN 300 MG/1
300 CAPSULE ORAL 2 TIMES DAILY
Qty: 180 CAPSULE | Refills: 1 | Status: SHIPPED | OUTPATIENT
Start: 2024-12-19 | End: 2025-06-17

## 2024-12-19 NOTE — PROGRESS NOTES
[facility]  [address] [place] [address]., [address]  [place] [address]  Phone: [phone]  Fax: [phone]    Patient's YOB: 1955    ASSESSMENT   Kalen was seen today for back pain.    Diagnoses and all orders for this visit:    Lumbar radiculitis  -     gabapentin (NEURONTIN) 300 MG capsule; Take 1 capsule by mouth in the morning and at bedtime for 180 days. Take for neuropathic symptoms Max Daily Amount: 600 mg  -     DME Order for (Specify) as OP    Spinal stenosis of lumbar region with neurogenic claudication  -     gabapentin (NEURONTIN) 300 MG capsule; Take 1 capsule by mouth in the morning and at bedtime for 180 days. Take for neuropathic symptoms Max Daily Amount: 600 mg  -     DME Order for (Specify) as OP    Lumbar facet arthropathy  -     gabapentin (NEURONTIN) 300 MG capsule; Take 1 capsule by mouth in the morning and at bedtime for 180 days. Take for neuropathic symptoms Max Daily Amount: 600 mg  -     DME Order for (Specify) as OP    Muscle spasm  -     DME Order for (Specify) as OP    Long term (current) use of anticoagulants    Chronic coronary microvascular dysfunction    H/O heart artery stent         IMPRESSION AND PLAN:  Kalen Rivas is a 69 y.o. male with history of CAD, DVT, Factor V deficiency, HTN, GERD, prostate CA, R TKA, L TKA w/ revision,  complains of lumbar pain. Since last visit, pt feels his symptoms have worsened. She is not a candidate for NSAIDs due to chronic Eliquis. Pt takes Skealxin [id number] QHS and Gabapentin 300mg BID. Pt s/p right L5-S1 hemilaminectomy     Patient was given information on the Mediterranean diet  Gabapentin 300 mg refill patient will take 300 in the morning 300 in the evening for neuropathic symptoms  Patient is not a candidate for NSAIDs due to use of Eliquis  Patient given a prescription for a rollator to assist with ambulation  Mr. Rivas has a reminder for a \"due or due soon\" health maintenance.

## 2024-12-30 ENCOUNTER — TELEPHONE (OUTPATIENT)
Facility: HOSPITAL | Age: 69
End: 2024-12-30

## 2024-12-30 NOTE — TELEPHONE ENCOUNTER
Call placed to pt regarding upcoming intake appt on  1/2/2025@ 4840. Educated on program details.Pt states that his left knee is painful and he is in need of surgery. Recently took a prednisone pack d/t pain and swelling since he was unable to get an injection. Reports elevated B/P in 140-150's from his norm of 130's. Discussed as possible side effect from Prednisone but encouraged to call his MD if it persisted and he was concerned.   After much discussion he has decided not to participate in rehab at this time d/t his knee. Has number to call when he is able to come in. All questions answered.

## 2025-02-23 NOTE — H&P
group B strep seen in 2/4 bottles from 5/23. Only apparent source would be LLE cellulitis, however no open wounds are apparent  - appreciate ID assistance  - repeat Bcx x2  - echo to r/o vegetations  - IV abx: vanc/rocephin  - monitor temp curve, WBC  - recheck procal today    Hx of recurrent DVT: has been on warfarin for many years  - continue warfarin, monitor INR  - have discussed switching to eliquis with patient and wife. They will think about it. Xarelto was previously discussed with them, but they didn't like some of the side effects they read about  - would recommend follow up with vascular office (GEOVANNY Vaca) to manage chronic DVT and monitor for development of lymphedema    Hypokalemia  - replace and monitor    HTN  - continue home meds          Anticipated Discharge: 2 days    DVT Prophylaxis:  []Lovenox  []Hep SQ  []SCDs  [x]Coumadin []DOAC  []On Heparin gtt     I have personally reviewed all pertinent labs, films and EKGs that have officially resulted. I reviewed available electronic documentation outlining the initial presentation as well as the emergency room physician's encounter.    Time spent reviewing records, independently interpreting results, obtaining history from patient or caregiver, performing physical exam, ordering tests and medications, communicating with specialists, documenting in the chart, and coordinating overall care is 75 minutes    GEOVANNY Bolivar
needed    Hospital Problems             Last Modified POA    * (Principal) Bacteremia 5/24/2023 Yes         DVT Prophylaxis:  []Lovenox  []Hep SQ  []SCDs  [x]Coumadin []DOAC  []On Heparin gtt     I have personally reviewed all pertinent labs, films and EKGs that have officially resulted. I reviewed available electronic documentation outlining the initial presentation as well as the emergency room physician's encounter.  @devan Chapman @5/24/23@
Minal Vasquez

## 2025-03-10 ENCOUNTER — TELEPHONE (OUTPATIENT)
Age: 70
End: 2025-03-10

## 2025-03-10 DIAGNOSIS — M47.816 LUMBAR FACET ARTHROPATHY: ICD-10-CM

## 2025-03-10 DIAGNOSIS — M54.16 LUMBAR RADICULITIS: ICD-10-CM

## 2025-03-10 DIAGNOSIS — M48.062 SPINAL STENOSIS OF LUMBAR REGION WITH NEUROGENIC CLAUDICATION: Primary | ICD-10-CM

## 2025-03-10 RX ORDER — GABAPENTIN 400 MG/1
400 CAPSULE ORAL 2 TIMES DAILY
Qty: 180 CAPSULE | Refills: 0 | Status: SHIPPED | OUTPATIENT
Start: 2025-03-10 | End: 2025-04-17 | Stop reason: SDUPTHER

## 2025-03-10 NOTE — TELEPHONE ENCOUNTER
Called patient and identified using two patient identifiers. Patient is asking for a refill on Gabapentin 300mg.    He also relayed information that he saw Podiatrist Aundrea Barrientos who told him he has Neuropathy in his feet and that he would benefit from taking an additional 100 mg of Gabapentin twice daily.  She suggested to patient that he talk to us to see if we could increase his Gabapentin form 300 mg BID to 400mg BID.

## 2025-03-10 NOTE — TELEPHONE ENCOUNTER
Patient called and is requesting a call back he has a few questions about the medication listed below that he needs refilled.        gabapentin (NEURONTIN) 300 MG capsule         Please call and advise patient at  [phone]

## 2025-03-11 NOTE — TELEPHONE ENCOUNTER
Called patient and verified using two patient identifiers.  Patient was informed Gabapentin was increased to 400mg BID.  No additional questions or concerns

## 2025-03-31 ENCOUNTER — OFFICE VISIT (OUTPATIENT)
Age: 70
End: 2025-03-31
Payer: MEDICARE

## 2025-03-31 VITALS
BODY MASS INDEX: 34.96 KG/M2 | DIASTOLIC BLOOD PRESSURE: 70 MMHG | OXYGEN SATURATION: 93 % | HEART RATE: 84 BPM | HEIGHT: 70 IN | SYSTOLIC BLOOD PRESSURE: 136 MMHG | WEIGHT: 244.2 LBS

## 2025-03-31 DIAGNOSIS — I25.10 CORONARY ARTERY DISEASE DUE TO LIPID RICH PLAQUE: Primary | ICD-10-CM

## 2025-03-31 DIAGNOSIS — I25.83 CORONARY ARTERY DISEASE DUE TO LIPID RICH PLAQUE: Primary | ICD-10-CM

## 2025-03-31 DIAGNOSIS — E78.00 PURE HYPERCHOLESTEROLEMIA: ICD-10-CM

## 2025-03-31 DIAGNOSIS — I10 ESSENTIAL HYPERTENSION WITH GOAL BLOOD PRESSURE LESS THAN 140/90: ICD-10-CM

## 2025-03-31 PROCEDURE — 99214 OFFICE O/P EST MOD 30 MIN: CPT | Performed by: INTERNAL MEDICINE

## 2025-03-31 PROCEDURE — 3078F DIAST BP <80 MM HG: CPT | Performed by: INTERNAL MEDICINE

## 2025-03-31 PROCEDURE — 3017F COLORECTAL CA SCREEN DOC REV: CPT | Performed by: INTERNAL MEDICINE

## 2025-03-31 PROCEDURE — G8427 DOCREV CUR MEDS BY ELIG CLIN: HCPCS | Performed by: INTERNAL MEDICINE

## 2025-03-31 PROCEDURE — 1159F MED LIST DOCD IN RCRD: CPT | Performed by: INTERNAL MEDICINE

## 2025-03-31 PROCEDURE — G8417 CALC BMI ABV UP PARAM F/U: HCPCS | Performed by: INTERNAL MEDICINE

## 2025-03-31 PROCEDURE — 1123F ACP DISCUSS/DSCN MKR DOCD: CPT | Performed by: INTERNAL MEDICINE

## 2025-03-31 PROCEDURE — 1036F TOBACCO NON-USER: CPT | Performed by: INTERNAL MEDICINE

## 2025-03-31 PROCEDURE — 3075F SYST BP GE 130 - 139MM HG: CPT | Performed by: INTERNAL MEDICINE

## 2025-03-31 NOTE — PROGRESS NOTES
mouth Daily with supper    Cholecalciferol (VITAMIN D3) 125 MCG (5000 UT) TABS Take 1 tablet by mouth daily    zinc 50 MG TABS tablet Take 1 tablet by mouth daily    ezetimibe (ZETIA) 10 MG tablet Take 1 tablet by mouth daily    acetaminophen (TYLENOL) 500 MG tablet Take 2 tablets by mouth every 6 hours as needed    ALPRAZolam (XANAX) 0.5 MG tablet Take 1 tablet by mouth nightly as needed.    butalbital-APAP-caffeine -40 MG CAPS per capsule Take 1 capsule by mouth every 6 hours as needed    labetalol (NORMODYNE) 100 MG tablet Take 1 tablet by mouth 2 times daily    lansoprazole (PREVACID) 30 MG delayed release capsule Take 1 capsule by mouth daily    lisinopril-hydroCHLOROthiazide (PRINZIDE;ZESTORETIC) 20-25 MG per tablet Take 1 tablet by mouth daily    montelukast (SINGULAIR) 10 MG tablet Take 1 tablet by mouth Daily with supper    oxyCODONE-acetaminophen (PERCOCET) 5-325 MG per tablet  (Patient not taking: Reported on 3/31/2025)     No current facility-administered medications for this visit.       Past Surgical History:   Procedure Laterality Date    CARDIAC PROCEDURE N/A 11/6/2024    Left heart cath / coronary angiography performed by Darrian Schilling MD at [facility] CARDIAC CATH LAB    CARDIAC PROCEDURE N/A 11/6/2024    Insert stent michelle coronary performed by Darrian Schilling MD at [facility] CARDIAC CATH LAB    HEENT Right 09/11/2018    eye surgery, macular     LUMBAR LAMINECTOMY  2000    LUMBAR LAMINECTOMY  1992    ORTHOPEDIC SURGERY  06-25-12    Right foot with excision of bursa and adipose tissue from fifth metatarsal base by Dr. Martinez    ORTHOPEDIC SURGERY Left 03/09/2022    left knee revision    PROSTATECTOMY  11/2018    ROTATOR CUFF REPAIR Right 01/28/2019    by Dr. Zee    SHOULDER ARTHROSCOPY Left 12/2020    WORK RELATED INJURY    TOTAL KNEE ARTHROPLASTY Left 2018    TOTAL KNEE ARTHROPLASTY Right 2/27/2024    RIGHT TOTAL KNEE REPLACEMENT performed by Jhonatan Ortiz MD at [facility] OR       Allergies and

## 2025-04-09 NOTE — PROGRESS NOTES
change of the supraspinatus and infraspinatus.    2. Moderate infraspinatus and mild subscapularis tendinosis.    3. Moderate narrowing of the subacromial outlet.   4. Fill-thickness retracted tear of the intra-articular biceps tendon. Tendon fibers are retracted past the bicipital groove, at least 8 cm from the biceps anchor insertion.    5. Moderate joint effusion with synovitis.   6. Moderate distension of the subacromial subdeltoid bursa.    Written by Diana Schofield as dictated by Aminta Pena MD.  I, Dr. Aminta Pena confirm that all documentation is accurate.

## 2025-04-14 ENCOUNTER — OFFICE VISIT (OUTPATIENT)
Age: 70
End: 2025-04-14
Payer: MEDICARE

## 2025-04-14 VITALS — HEIGHT: 70 IN | BODY MASS INDEX: 34.93 KG/M2 | WEIGHT: 244 LBS

## 2025-04-14 DIAGNOSIS — M19.041 OSTEOARTHRITIS OF METACARPOPHALANGEAL (MCP) JOINT OF RIGHT INDEX FINGER: ICD-10-CM

## 2025-04-14 DIAGNOSIS — M65.322 TRIGGER INDEX FINGER OF LEFT HAND: Primary | ICD-10-CM

## 2025-04-14 DIAGNOSIS — M15.2 OSTEOARTHRITIS OF PROXIMAL INTERPHALANGEAL (PIP) JOINT OF RIGHT INDEX FINGER: ICD-10-CM

## 2025-04-14 DIAGNOSIS — M65.332 TRIGGER MIDDLE FINGER OF LEFT HAND: ICD-10-CM

## 2025-04-14 PROCEDURE — 20600 DRAIN/INJ JOINT/BURSA W/O US: CPT

## 2025-04-14 PROCEDURE — 1036F TOBACCO NON-USER: CPT

## 2025-04-14 PROCEDURE — 99212 OFFICE O/P EST SF 10 MIN: CPT

## 2025-04-14 PROCEDURE — 20550 NJX 1 TENDON SHEATH/LIGAMENT: CPT

## 2025-04-14 PROCEDURE — G8427 DOCREV CUR MEDS BY ELIG CLIN: HCPCS

## 2025-04-14 PROCEDURE — 1160F RVW MEDS BY RX/DR IN RCRD: CPT

## 2025-04-14 PROCEDURE — 1123F ACP DISCUSS/DSCN MKR DOCD: CPT

## 2025-04-14 PROCEDURE — G8417 CALC BMI ABV UP PARAM F/U: HCPCS

## 2025-04-14 PROCEDURE — 1159F MED LIST DOCD IN RCRD: CPT

## 2025-04-14 PROCEDURE — 1125F AMNT PAIN NOTED PAIN PRSNT: CPT

## 2025-04-14 PROCEDURE — 3017F COLORECTAL CA SCREEN DOC REV: CPT

## 2025-04-14 RX ORDER — LIDOCAINE HYDROCHLORIDE 10 MG/ML
2 INJECTION, SOLUTION INFILTRATION; PERINEURAL ONCE
Status: COMPLETED | OUTPATIENT
Start: 2025-04-14 | End: 2025-04-14

## 2025-04-14 RX ADMIN — LIDOCAINE HYDROCHLORIDE 2 ML: 10 INJECTION, SOLUTION INFILTRATION; PERINEURAL at 14:35

## 2025-04-14 NOTE — PROGRESS NOTES
Kalen Rivas is a 70 y.o. male right handed, retiree, former .  Worker's Compensation and legal considerations: none    Chief Complaint   Patient presents with    Hand Pain     Bilateral index     Pain Score:   7    Subjective:     4/14/2025 HPI: Patient presents today due to concern of left index and middle finger pain and locking.  Also reporting right index finger pain at the MCP and PIP joints.    Initial HPI: Patient presents today with a concern of right little finger, left thumb, left middle finger pain and locking.    Date of onset: Indeterminate  Injury: None recently  Prior Treatment: No  Contributory history: History of right thumb fracture, left thumb, middle trigger finger injections, right little finger trigger finger injection    ROS: Review of Systems - General ROS: negative except HPI    Past Medical History:   Diagnosis Date    Arthritis     CAD (coronary artery disease)     DVT (deep venous thrombosis) ([facility]) 2000    POST BACK SX. 2- LEFT LEG    DVT (deep venous thrombosis) ([facility]) 1992    post sx.  R LEG    Factor V deficiency ([facility])     GERD (gastroesophageal reflux disease)     Hypertension     Prostate cancer ([facility]) 2018    Pure hypercholesterolemia     Spinal stenosis        Past Surgical History:   Procedure Laterality Date    CARDIAC PROCEDURE N/A 11/6/2024    Left heart cath / coronary angiography performed by Darrian Schilling MD at [facility] CARDIAC CATH LAB    CARDIAC PROCEDURE N/A 11/6/2024    Insert stent michelle coronary performed by Darrian Schilling MD at [facility] CARDIAC CATH LAB    HEENT Right 09/11/2018    eye surgery, macular     LUMBAR LAMINECTOMY  2000    LUMBAR LAMINECTOMY  1992    ORTHOPEDIC SURGERY  06-25-12    Right foot with excision of bursa and adipose tissue from fifth metatarsal base by Dr. Martinez    ORTHOPEDIC SURGERY Left 03/09/2022    left knee revision    PROSTATECTOMY  11/2018    ROTATOR CUFF REPAIR Right 01/28/2019    by Dr. Zee    SHOULDER ARTHROSCOPY Left 12/2020

## 2025-04-16 DIAGNOSIS — M62.838 MUSCLE SPASM: ICD-10-CM

## 2025-04-17 ENCOUNTER — OFFICE VISIT (OUTPATIENT)
Age: 70
End: 2025-04-17
Payer: OTHER GOVERNMENT

## 2025-04-17 VITALS — TEMPERATURE: 98 F | WEIGHT: 240 LBS | BODY MASS INDEX: 34.36 KG/M2 | HEIGHT: 70 IN

## 2025-04-17 DIAGNOSIS — Z95.5 H/O HEART ARTERY STENT: ICD-10-CM

## 2025-04-17 DIAGNOSIS — M47.816 LUMBAR FACET ARTHROPATHY: ICD-10-CM

## 2025-04-17 DIAGNOSIS — Z79.01 CHRONIC ANTICOAGULATION: ICD-10-CM

## 2025-04-17 DIAGNOSIS — M17.12 OSTEOARTHRITIS OF LEFT KNEE, UNSPECIFIED OSTEOARTHRITIS TYPE: ICD-10-CM

## 2025-04-17 DIAGNOSIS — M48.062 SPINAL STENOSIS OF LUMBAR REGION WITH NEUROGENIC CLAUDICATION: Primary | ICD-10-CM

## 2025-04-17 DIAGNOSIS — R26.9 GAIT ABNORMALITY: ICD-10-CM

## 2025-04-17 DIAGNOSIS — M54.16 LUMBAR RADICULITIS: ICD-10-CM

## 2025-04-17 DIAGNOSIS — M62.838 MUSCLE SPASM: ICD-10-CM

## 2025-04-17 PROCEDURE — 1159F MED LIST DOCD IN RCRD: CPT | Performed by: PHYSICAL MEDICINE & REHABILITATION

## 2025-04-17 PROCEDURE — 1160F RVW MEDS BY RX/DR IN RCRD: CPT | Performed by: PHYSICAL MEDICINE & REHABILITATION

## 2025-04-17 PROCEDURE — 1125F AMNT PAIN NOTED PAIN PRSNT: CPT | Performed by: PHYSICAL MEDICINE & REHABILITATION

## 2025-04-17 PROCEDURE — 99214 OFFICE O/P EST MOD 30 MIN: CPT | Performed by: PHYSICAL MEDICINE & REHABILITATION

## 2025-04-17 PROCEDURE — 1123F ACP DISCUSS/DSCN MKR DOCD: CPT | Performed by: PHYSICAL MEDICINE & REHABILITATION

## 2025-04-17 RX ORDER — METHYLPREDNISOLONE 4 MG/1
TABLET ORAL
Qty: 1 KIT | Refills: 0 | Status: SHIPPED | OUTPATIENT
Start: 2025-04-17 | End: 2025-04-23

## 2025-04-17 RX ORDER — METHOCARBAMOL 500 MG/1
500 TABLET, FILM COATED ORAL 2 TIMES DAILY PRN
Qty: 180 TABLET | Refills: 1 | Status: SHIPPED | OUTPATIENT
Start: 2025-04-17 | End: 2025-10-14

## 2025-04-17 RX ORDER — GABAPENTIN 400 MG/1
400 CAPSULE ORAL 2 TIMES DAILY
Qty: 180 CAPSULE | Refills: 1 | Status: SHIPPED | OUTPATIENT
Start: 2025-06-06 | End: 2025-12-03

## 2025-04-17 RX ORDER — METHOCARBAMOL 500 MG/1
TABLET, FILM COATED ORAL
Qty: 180 TABLET | Refills: 0 | OUTPATIENT
Start: 2025-04-17

## 2025-05-01 ENCOUNTER — TELEPHONE (OUTPATIENT)
Age: 70
End: 2025-05-01

## 2025-05-01 NOTE — TELEPHONE ENCOUNTER
Patient states that after speaking with his insurance he was told that his medication is needing a prior auth    methocarbamol (ROBAXIN) 500 MG tablet     Patient tel [phone]        [facility] [address] [place] [address] -  [phone] - F [phone]

## 2025-05-01 NOTE — TELEPHONE ENCOUNTER
PA sent in 5-1-25/ CS  Tried to reach patient to inform him no answer, no answering machine  ( six rings )

## 2025-05-02 ENCOUNTER — OFFICE VISIT (OUTPATIENT)
Age: 70
End: 2025-05-02
Payer: MEDICARE

## 2025-05-02 VITALS
DIASTOLIC BLOOD PRESSURE: 70 MMHG | SYSTOLIC BLOOD PRESSURE: 140 MMHG | OXYGEN SATURATION: 96 % | HEIGHT: 70 IN | BODY MASS INDEX: 34.36 KG/M2 | HEART RATE: 68 BPM | WEIGHT: 240 LBS

## 2025-05-02 DIAGNOSIS — I25.83 CORONARY ARTERY DISEASE DUE TO LIPID RICH PLAQUE: Primary | ICD-10-CM

## 2025-05-02 DIAGNOSIS — Z01.818 PRE-OP EVALUATION: ICD-10-CM

## 2025-05-02 DIAGNOSIS — E78.00 PURE HYPERCHOLESTEROLEMIA: ICD-10-CM

## 2025-05-02 DIAGNOSIS — I25.10 CORONARY ARTERY DISEASE DUE TO LIPID RICH PLAQUE: Primary | ICD-10-CM

## 2025-05-02 DIAGNOSIS — I10 ESSENTIAL HYPERTENSION WITH GOAL BLOOD PRESSURE LESS THAN 140/90: ICD-10-CM

## 2025-05-02 PROCEDURE — 99214 OFFICE O/P EST MOD 30 MIN: CPT | Performed by: INTERNAL MEDICINE

## 2025-05-02 PROCEDURE — 1125F AMNT PAIN NOTED PAIN PRSNT: CPT | Performed by: INTERNAL MEDICINE

## 2025-05-02 PROCEDURE — 1123F ACP DISCUSS/DSCN MKR DOCD: CPT | Performed by: INTERNAL MEDICINE

## 2025-05-02 PROCEDURE — 3017F COLORECTAL CA SCREEN DOC REV: CPT | Performed by: INTERNAL MEDICINE

## 2025-05-02 PROCEDURE — G8428 CUR MEDS NOT DOCUMENT: HCPCS | Performed by: INTERNAL MEDICINE

## 2025-05-02 PROCEDURE — 3077F SYST BP >= 140 MM HG: CPT | Performed by: INTERNAL MEDICINE

## 2025-05-02 PROCEDURE — G8417 CALC BMI ABV UP PARAM F/U: HCPCS | Performed by: INTERNAL MEDICINE

## 2025-05-02 PROCEDURE — 3078F DIAST BP <80 MM HG: CPT | Performed by: INTERNAL MEDICINE

## 2025-05-02 PROCEDURE — 1036F TOBACCO NON-USER: CPT | Performed by: INTERNAL MEDICINE

## 2025-05-02 RX ORDER — LISINOPRIL AND HYDROCHLOROTHIAZIDE 12.5; 2 MG/1; MG/1
TABLET ORAL
COMMUNITY
Start: 2025-04-01

## 2025-05-02 NOTE — PROGRESS NOTES
DVT    Hypertension: /70.  Currently on labetalol, lisinopril and hydrochlorothiazide.  Continue same.  Salt restriction and DASH diet discussed.  Have not take medication this morning yet    Hyperlipidemia: Currently on Zetia.  Continue same.  Unable to tolerate statin medication in the past because of significant joint aches and the myalgia  If LDL is not at goal below 70, recommend PCSK9 inhibitor.  This is being checked by PCP regularly    Recurrent DVT: Currently on apixaban.  Defer to PCP    Preoperative evaluation:  Considering elective knee surgery for life limiting joint pain  Patient said he is planning to consider this in either August or September 2025  As coronary stenting was elective, it is reasonable to proceed with elective joint surgery in August or September 2025 without further cardiac workup  No unstable coronary symptoms or decompensated heart failure at this time.  Okay to stop Plavix 5 days before surgery however he needs to be on aspirin once he is off of Plavix and he understands that well  Acceptable candidate    This plan was discussed with patient who is in agreement.    Thank you for allowing me to participate in patient care. Please feel free to call me if you have any question or concern.     Darrian Schilling MD  Please note: This document has been produced using voice recognition software. Unrecognized errors in transcription may be present.

## 2025-07-07 RX ORDER — CLOPIDOGREL BISULFATE 75 MG/1
75 TABLET ORAL DAILY
Qty: 90 TABLET | Refills: 3 | Status: SHIPPED | OUTPATIENT
Start: 2025-07-07

## 2025-08-05 ENCOUNTER — TELEPHONE (OUTPATIENT)
Age: 70
End: 2025-08-05

## 2025-08-11 ENCOUNTER — OFFICE VISIT (OUTPATIENT)
Age: 70
End: 2025-08-11
Payer: OTHER GOVERNMENT

## 2025-08-11 VITALS — BODY MASS INDEX: 35.3 KG/M2 | HEIGHT: 70 IN | RESPIRATION RATE: 16 BRPM | TEMPERATURE: 97.9 F | WEIGHT: 246.6 LBS

## 2025-08-11 DIAGNOSIS — R26.9 GAIT ABNORMALITY: ICD-10-CM

## 2025-08-11 DIAGNOSIS — M62.838 MUSCLE SPASM: ICD-10-CM

## 2025-08-11 DIAGNOSIS — G89.29 CHRONIC PAIN OF LEFT KNEE: ICD-10-CM

## 2025-08-11 DIAGNOSIS — M48.062 SPINAL STENOSIS OF LUMBAR REGION WITH NEUROGENIC CLAUDICATION: Primary | ICD-10-CM

## 2025-08-11 DIAGNOSIS — Z79.01 CHRONIC ANTICOAGULATION: ICD-10-CM

## 2025-08-11 DIAGNOSIS — M17.12 OSTEOARTHRITIS OF LEFT KNEE, UNSPECIFIED OSTEOARTHRITIS TYPE: ICD-10-CM

## 2025-08-11 DIAGNOSIS — M54.16 LUMBAR RADICULITIS: ICD-10-CM

## 2025-08-11 DIAGNOSIS — M47.816 LUMBAR FACET ARTHROPATHY: ICD-10-CM

## 2025-08-11 DIAGNOSIS — M25.562 CHRONIC PAIN OF LEFT KNEE: ICD-10-CM

## 2025-08-11 PROCEDURE — 1160F RVW MEDS BY RX/DR IN RCRD: CPT | Performed by: PHYSICAL MEDICINE & REHABILITATION

## 2025-08-11 PROCEDURE — 1125F AMNT PAIN NOTED PAIN PRSNT: CPT | Performed by: PHYSICAL MEDICINE & REHABILITATION

## 2025-08-11 PROCEDURE — 1159F MED LIST DOCD IN RCRD: CPT | Performed by: PHYSICAL MEDICINE & REHABILITATION

## 2025-08-11 PROCEDURE — 1123F ACP DISCUSS/DSCN MKR DOCD: CPT | Performed by: PHYSICAL MEDICINE & REHABILITATION

## 2025-08-11 PROCEDURE — 99214 OFFICE O/P EST MOD 30 MIN: CPT | Performed by: PHYSICAL MEDICINE & REHABILITATION

## 2025-08-11 RX ORDER — GABAPENTIN 400 MG/1
400 CAPSULE ORAL 2 TIMES DAILY
Qty: 180 CAPSULE | Refills: 1 | Status: SHIPPED | OUTPATIENT
Start: 2025-09-10 | End: 2026-03-09

## 2025-08-12 ENCOUNTER — TRANSCRIBE ORDERS (OUTPATIENT)
Facility: HOSPITAL | Age: 70
End: 2025-08-12

## 2025-08-12 ENCOUNTER — HOSPITAL ENCOUNTER (OUTPATIENT)
Facility: HOSPITAL | Age: 70
Discharge: HOME | End: 2025-08-15
Payer: MEDICARE

## 2025-08-12 ENCOUNTER — HOSPITAL ENCOUNTER (OUTPATIENT)
Facility: HOSPITAL | Age: 70
Discharge: HOME | End: 2025-08-14

## 2025-08-12 DIAGNOSIS — Z96.649 PAIN DUE TO HIP JOINT PROSTHESIS, UNSPECIFIED LATERALITY, SEQUELA: ICD-10-CM

## 2025-08-12 DIAGNOSIS — T84.84XS PAIN DUE TO HIP JOINT PROSTHESIS, UNSPECIFIED LATERALITY, SEQUELA: ICD-10-CM

## 2025-08-12 DIAGNOSIS — Z96.649 PAIN DUE TO HIP JOINT PROSTHESIS, UNSPECIFIED LATERALITY, SEQUELA: Primary | ICD-10-CM

## 2025-08-12 DIAGNOSIS — T84.84XS PAIN DUE TO HIP JOINT PROSTHESIS, UNSPECIFIED LATERALITY, SEQUELA: Primary | ICD-10-CM

## 2025-08-12 LAB
ALBUMIN SERPL-MCNC: 3.8 G/DL (ref 3.4–5)
ALBUMIN/GLOB SERPL: 1.4 (ref 0.8–1.7)
ALP SERPL-CCNC: 71 U/L (ref 45–117)
ALT SERPL-CCNC: 22 U/L (ref 10–50)
ANION GAP SERPL CALC-SCNC: 12 MMOL/L (ref 3–18)
APPEARANCE UR: CLEAR
APTT PPP: 30 SEC (ref 21.7–34.2)
AST SERPL-CCNC: 26 U/L (ref 10–38)
BASOPHILS # BLD: 0.05 K/UL (ref 0–0.1)
BASOPHILS NFR BLD: 0.7 % (ref 0–2)
BILIRUB SERPL-MCNC: 0.2 MG/DL (ref 0.2–1)
BILIRUB UR QL: NEGATIVE
BUN SERPL-MCNC: 15 MG/DL (ref 6–23)
BUN/CREAT SERPL: 16 (ref 12–20)
CALCIUM SERPL-MCNC: 9.9 MG/DL (ref 8.5–10.1)
CHLORIDE SERPL-SCNC: 102 MMOL/L (ref 98–107)
CO2 SERPL-SCNC: 26 MMOL/L (ref 21–32)
COLOR UR: YELLOW
CREAT SERPL-MCNC: 0.93 MG/DL (ref 0.6–1.3)
DIFFERENTIAL METHOD BLD: ABNORMAL
EKG ATRIAL RATE: 85 BPM
EKG DIAGNOSIS: NORMAL
EKG P AXIS: 47 DEGREES
EKG P-R INTERVAL: 196 MS
EKG Q-T INTERVAL: 368 MS
EKG QRS DURATION: 88 MS
EKG QTC CALCULATION (BAZETT): 437 MS
EKG R AXIS: 2 DEGREES
EKG T AXIS: 59 DEGREES
EKG VENTRICULAR RATE: 85 BPM
EOSINOPHIL # BLD: 0.21 K/UL (ref 0–0.4)
EOSINOPHIL NFR BLD: 3 % (ref 0–5)
ERYTHROCYTE [DISTWIDTH] IN BLOOD BY AUTOMATED COUNT: 12.9 % (ref 11.6–14.5)
ERYTHROCYTE [SEDIMENTATION RATE] IN BLOOD: 8 MM/HR (ref 0–20)
EST. AVERAGE GLUCOSE BLD GHB EST-MCNC: 124 MG/DL
GLOBULIN SER CALC-MCNC: 2.7 G/DL (ref 2–4)
GLUCOSE SERPL-MCNC: 164 MG/DL (ref 74–108)
GLUCOSE UR STRIP.AUTO-MCNC: NEGATIVE MG/DL
HBA1C MFR BLD: 6 % (ref 4.2–5.6)
HCT VFR BLD AUTO: 39.8 % (ref 36–48)
HGB BLD-MCNC: 13.8 G/DL (ref 13–16)
HGB UR QL STRIP: NEGATIVE
IMM GRANULOCYTES # BLD AUTO: 0.04 K/UL (ref 0–0.04)
IMM GRANULOCYTES NFR BLD AUTO: 0.6 % (ref 0–0.5)
INR PPP: 1.1 (ref 0.9–1.2)
KETONES UR QL STRIP.AUTO: NEGATIVE MG/DL
LEUKOCYTE ESTERASE UR QL STRIP.AUTO: NEGATIVE
LYMPHOCYTES # BLD: 1.2 K/UL (ref 0.9–3.3)
LYMPHOCYTES NFR BLD: 17.2 % (ref 21–52)
MCH RBC QN AUTO: 33 PG (ref 24–34)
MCHC RBC AUTO-ENTMCNC: 34.7 G/DL (ref 31–37)
MCV RBC AUTO: 95.2 FL (ref 78–100)
MONOCYTES # BLD: 0.7 K/UL (ref 0.05–1.2)
MONOCYTES NFR BLD: 10.1 % (ref 3–10)
NEUTS SEG # BLD: 4.76 K/UL (ref 1.8–8)
NEUTS SEG NFR BLD: 68.4 % (ref 40–73)
NITRITE UR QL STRIP.AUTO: NEGATIVE
NRBC # BLD: 0 K/UL (ref 0–0.01)
NRBC BLD-RTO: 0 PER 100 WBC
PH UR STRIP: 5.5 (ref 5–8)
PLATELET # BLD AUTO: 168 K/UL (ref 135–420)
PMV BLD AUTO: 10.8 FL (ref 9.2–11.8)
POTASSIUM SERPL-SCNC: 4 MMOL/L (ref 3.5–5.5)
PROT SERPL-MCNC: 6.5 G/DL (ref 6.4–8.2)
PROT UR STRIP-MCNC: NEGATIVE MG/DL
PROTHROMBIN TIME: 14.5 SEC (ref 12–15.1)
RBC # BLD AUTO: 4.18 M/UL (ref 4.35–5.65)
SODIUM SERPL-SCNC: 140 MMOL/L (ref 136–145)
SP GR UR REFRACTOMETRY: 1.02 (ref 1–1.03)
UROBILINOGEN UR QL STRIP.AUTO: 0.2 EU/DL (ref 0.2–1)
WBC # BLD AUTO: 7 K/UL (ref 4.6–13.2)

## 2025-08-12 PROCEDURE — 85730 THROMBOPLASTIN TIME PARTIAL: CPT

## 2025-08-12 PROCEDURE — 85652 RBC SED RATE AUTOMATED: CPT

## 2025-08-12 PROCEDURE — 85025 COMPLETE CBC W/AUTO DIFF WBC: CPT

## 2025-08-12 PROCEDURE — 80053 COMPREHEN METABOLIC PANEL: CPT

## 2025-08-12 PROCEDURE — 36415 COLL VENOUS BLD VENIPUNCTURE: CPT

## 2025-08-12 PROCEDURE — 85610 PROTHROMBIN TIME: CPT

## 2025-08-12 PROCEDURE — 83036 HEMOGLOBIN GLYCOSYLATED A1C: CPT

## 2025-08-12 PROCEDURE — 81003 URINALYSIS AUTO W/O SCOPE: CPT

## 2025-08-14 LAB
BACTERIA SPEC CULT: NORMAL
BACTERIA SPEC CULT: NORMAL
SERVICE CMNT-IMP: NORMAL

## 2025-08-18 LAB
EKG ATRIAL RATE: 85 BPM
EKG DIAGNOSIS: NORMAL
EKG P AXIS: 47 DEGREES
EKG P-R INTERVAL: 196 MS
EKG Q-T INTERVAL: 368 MS
EKG QRS DURATION: 88 MS
EKG QTC CALCULATION (BAZETT): 437 MS
EKG R AXIS: 2 DEGREES
EKG T AXIS: 59 DEGREES
EKG VENTRICULAR RATE: 85 BPM

## 2025-09-05 ENCOUNTER — TELEPHONE (OUTPATIENT)
Age: 70
End: 2025-09-05

## 2025-09-06 DIAGNOSIS — M62.838 OTHER MUSCLE SPASM: ICD-10-CM

## 2025-09-07 PROBLEM — Z96.659 FAILED TOTAL KNEE ARTHROPLASTY: Chronic | Status: ACTIVE | Noted: 2022-03-09

## 2025-09-07 PROBLEM — Z96.659 FAILED TOTAL KNEE ARTHROPLASTY: Status: ACTIVE | Noted: 2022-03-09

## 2025-09-07 PROBLEM — T84.018A FAILED TOTAL KNEE ARTHROPLASTY: Status: ACTIVE | Noted: 2022-03-09

## 2025-09-07 PROBLEM — T84.018A FAILED TOTAL KNEE ARTHROPLASTY: Chronic | Status: ACTIVE | Noted: 2022-03-09

## 2025-09-07 RX ORDER — METHOCARBAMOL 500 MG/1
TABLET, FILM COATED ORAL
Qty: 180 TABLET | Refills: 0 | Status: SHIPPED | OUTPATIENT
Start: 2025-09-07

## (undated) DEVICE — SOLUTION IRRIG 3000ML 0.9% SOD CHL FLX CONT 0797208] ICU MEDICAL INC]

## (undated) DEVICE — KERLIX BANDAGE ROLL: Brand: KERLIX

## (undated) DEVICE — SMARTSLEEVE SURGICAL GOWN, 3XL LONG: Brand: CONVERTORS

## (undated) DEVICE — Z DISCONTINUED BY MEDLINE USE 2711682 TRAY SKIN PREP DRY W/ PREM GLV

## (undated) DEVICE — ABDOMINAL PAD: Brand: DERMACEA

## (undated) DEVICE — DEVICE INFL W ACCS + HEMSTAS VLV INSRT TOOL AND TORQ BASIX

## (undated) DEVICE — BLANKET WRM AD W50XL85.8IN PACU FULL BODY FORC AIR

## (undated) DEVICE — STRYKER PERFORMANCE SERIES SAGITTAL BLADE: Brand: STRYKER PERFORMANCE SERIES

## (undated) DEVICE — 3M™ TEGADERM™ TRANSPARENT FILM DRESSING FRAME STYLE, 1626W, 4 IN X 4-3/4 IN (10 CM X 12 CM), 50/CT 4CT/CASE: Brand: 3M™ TEGADERM™

## (undated) DEVICE — BLADE RMFG DBL RECIP DBL SD --

## (undated) DEVICE — HANDPIECE SET WITH HIGH FLOW TIP AND SUCTION TUBE: Brand: INTERPULSE

## (undated) DEVICE — BIPOLAR SEALER 23-112-1 AQM 6.0: Brand: AQUAMANTYS ®

## (undated) DEVICE — BUR SHV CUT HLLW 6 FLUT 5.5MM --

## (undated) DEVICE — BUR SURG 10 FLUT 10 MM RND CARBIDE UPWR

## (undated) DEVICE — GAUZE,SPONGE,4"X4",16PLY,STRL,LF,10/TRAY: Brand: MEDLINE

## (undated) DEVICE — DRAPE,REIN 53X77,STERILE: Brand: MEDLINE

## (undated) DEVICE — SPONGE LAP 18X18IN STRL -- 5/PK

## (undated) DEVICE — T-MAX DISPOSABLE FACE MASK 8 PER BOX

## (undated) DEVICE — PROCEDURE KIT FLUID MGMT 10 FR CUST MAINFOLD

## (undated) DEVICE — SOLUTION IV 1000ML 0.9% SOD CHL

## (undated) DEVICE — SYR LR LCK 1ML GRAD NSAF 30ML --

## (undated) DEVICE — BLADELESS OPTICAL TROCAR WITH FIXATION CANNULA: Brand: VERSAONE

## (undated) DEVICE — X-RAY SPONGES,12 PLY: Brand: DERMACEA

## (undated) DEVICE — 3M™ STERI-DRAPE™ INSTRUMENT POUCH 1018: Brand: STERI-DRAPE™

## (undated) DEVICE — 1010 S-DRAPE TOWEL DRAPE 10/BX: Brand: STERI-DRAPE™

## (undated) DEVICE — DEVICE SECUREMENT AD W/ TRICOT ANCHR PD FOR F LTX SIL CATH

## (undated) DEVICE — INTENDED FOR TISSUE SEPARATION, AND OTHER PROCEDURES THAT REQUIRE A SHARP SURGICAL BLADE TO PUNCTURE OR CUT.: Brand: BARD-PARKER ® CARBON RIB-BACK BLADES

## (undated) DEVICE — COVER US PRB W15XL120CM W/ GEL RUBBERBAND TAPE STRP FLD GEN

## (undated) DEVICE — SPECIMEN RETRIEVAL POUCH: Brand: ENDO CATCH GOLD

## (undated) DEVICE — SYRINGE NDL 23GA 3ML L1IN TURQ PLAS NDL S STL SHLD HYPO BVL

## (undated) DEVICE — PIN GUIDE FIX 3.2X62 MM SCREW GS903A0620322P] KOMET MEDICAL]

## (undated) DEVICE — PACK SURG BSHR TOT KNEE LF

## (undated) DEVICE — SUTURE VCRL + SZ 2 L27IN ABSRB UD TP-1 L65MM 1/2 CIR TAPR VCP849G

## (undated) DEVICE — GOWN ,SIRUS ,NONREINFORCED 4XL: Brand: MEDLINE

## (undated) DEVICE — GUIDEWIRE VASC L260CM DIA0.035IN RAD 3MM J TIP L7CM PTFE

## (undated) DEVICE — ELASTIC BANDAGE 6": Brand: CARDINAL HEALTH

## (undated) DEVICE — STOCKING COMPR M L16-18IN LNG 19MMHG ANK 8-9IN CALF 12-15IN

## (undated) DEVICE — (D)PREP SKN CHLRAPRP APPL 26ML -- CONVERT TO ITEM 371833

## (undated) DEVICE — BANDAGE,GAUZE,BULKEE II,4.5"X4.1YD,STRL: Brand: MEDLINE

## (undated) DEVICE — SUT VCRL + 0 27IN CT1 UD --

## (undated) DEVICE — THE CANADY HYBRID PLASMA SCALPEL IS AN ELECTROSURGICAL PLASMA SCALPEL THAT USES AN 85MM BENDABLE PADDLE BLADE TIP. THE ELECTROSURGICAL PLASMA SCALPEL IS USED TO SIMULTANEOUSLY CUT AND COAGULATE BIOLOGICAL TISSUE.: Brand: CANADY HYBRID PLASMA PADDLE BLADE

## (undated) DEVICE — Z DISCONTINUED USE 2744636  DRESSING AQUACEL 14 IN ALG W3.5XL14IN POLYUR FLM CVR W/ HYDRCOLL

## (undated) DEVICE — KENDALL SCD EXPRESS SLEEVES, KNEE LENGTH, MEDIUM: Brand: KENDALL SCD

## (undated) DEVICE — STOCKINET,IMPERVIOUS,6X30: Brand: MEDLINE

## (undated) DEVICE — CATH NEPHSTMY 4 WNG MCOT 24FR -- LTX

## (undated) DEVICE — OCCLUSIVE GAUZE STRIP,3% BISMUTH TRIBROMOPHENATE IN PETROLATUM BLEND: Brand: XEROFORM

## (undated) DEVICE — STAPLER SKIN H3.9MM WIRE DIA0.58MM CRWN 6.9MM 35 STPL FIX

## (undated) DEVICE — SUTURE MCRYL SZ 2-0 L36IN ABSRB UD L36MM CT-1 1/2 CIR Y945H

## (undated) DEVICE — HI-TORQUE VERSACORE FLOPPY GUIDE WIRE SYSTEM 145 CM: Brand: HI-TORQUE VERSACORE

## (undated) DEVICE — INTENDED FOR TISSUE SEPARATION, AND OTHER PROCEDURES THAT REQUIRE A SHARP SURGICAL BLADE TO PUNCTURE OR CUT.: Brand: BARD-PARKER SAFETY BLADES SIZE 10, STERILE

## (undated) DEVICE — ZIP 16 SURGICAL SKIN CLOSURE DEVICE: Brand: ZIP 16 SURGICAL SKIN CLOSURE DEVICE

## (undated) DEVICE — 3M™ STERI-DRAPE™ U-DRAPE 1015: Brand: STERI-DRAPE™

## (undated) DEVICE — CATH BLLN ANGIO 2.25X12MM NC EUPHORIA RX

## (undated) DEVICE — VISUALIZATION SYSTEM: Brand: CLEARIFY

## (undated) DEVICE — (D)PACK ICE DISP -- DISC BY MFR

## (undated) DEVICE — SYRINGE MED 3ML NDL 22GA L1 1/2IN REG BVL SFGLDE

## (undated) DEVICE — SUTURE V-LOC 90 3-0 L6IN ABSRB UD CV-23 L17MM 1/2 CIR VLOCM1904

## (undated) DEVICE — RADIFOCUS OPTITORQUE ANGIOGRAPHIC CATHETER: Brand: OPTITORQUE

## (undated) DEVICE — NEEDLE NRV BLK 21GA L4IN 30DEG INSUL BVL EXTN SET STIMUPLEX

## (undated) DEVICE — DISPOSABLE TOURNIQUET CUFF SINGLE BLADDER, DUAL PORT AND QUICK CONNECT CONNECTOR: Brand: COLOR CUFF

## (undated) DEVICE — TOWEL,OR,DSP,ST,BLUE,STD,4/PK,20PK/CS: Brand: MEDLINE

## (undated) DEVICE — DRAPE KIT ACCS 4-ARM DISP -- DA VINCI

## (undated) DEVICE — DISPOSABLE MULTI BAG ADAPTERS Y                                    TUBING, STERILE, 2 TO A SET 6 SETS                                    PER BOX

## (undated) DEVICE — NEEDLE HYPO 25GA L1.5IN BVL ORIENTED ECLIPSE

## (undated) DEVICE — SYR IRR CATH TIP LR ADPT 70ML -- CONVERT TO ITEM 363120

## (undated) DEVICE — Device

## (undated) DEVICE — WRAP CLD THER COMPR UNIV BLK SHLDR FOAM REUSE PREM

## (undated) DEVICE — SHOULDER STABILIZATION KIT,                                    DISPOSABLE 12 PER BOX

## (undated) DEVICE — COPILOT BLEEDBACK CONTROL VALVE: Brand: COPILOT

## (undated) DEVICE — PREP SKN CHLRAPRP APL 26ML STR --

## (undated) DEVICE — INSTRMT SET WND CLSR SUT PASS --

## (undated) DEVICE — GARMENT,MEDLINE,DVT,SEQUENTIAL,CALF,MD: Brand: MEDLINE

## (undated) DEVICE — OBTRTR BLDELSS 8MM DISP -- DA VINCI - SNGL USE

## (undated) DEVICE — SUTURE VCRL SZ 3-0 L27IN ABSRB UD L26MM SH 1/2 CIR J416H

## (undated) DEVICE — BAG DRAIN URIN 2000ML LF STRL -- CONVERT TO ITEM 363123

## (undated) DEVICE — STERILE POLYISOPRENE POWDER-FREE SURGICAL GLOVES: Brand: PROTEXIS

## (undated) DEVICE — ANGIOGRAPHY KIT CUST VASC

## (undated) DEVICE — KIT CLN UP BON SECOURS MARYV

## (undated) DEVICE — HOOD, PEEL-AWAY: Brand: FLYTE

## (undated) DEVICE — SOFT SILICONE HYDROCELLULAR SACRUM DRESSING WITH LOCK AWAY LAYER: Brand: ALLEVYN LIFE SACRUM (LARGE) PACK OF 10

## (undated) DEVICE — GOWN,REINFORCED,POLY,AURORA,XXLARGE,STR: Brand: MEDLINE

## (undated) DEVICE — GUIDEWIRE VASC L150CM DIA0.035IN FLX END L7CM J 3MM PTFE

## (undated) DEVICE — (D)SYR 10ML 1/5ML GRAD NSAF -- PKGING CHANGE USE ITEM 338027

## (undated) DEVICE — CATHETER GUID EBU3.5 6 FRX100 CM VISTA BRT TIP

## (undated) DEVICE — DRAPE,UTILITY,TAPE,15X26,STERILE: Brand: MEDLINE

## (undated) DEVICE — GLIDESHEATH SLENDER STAINLESS STEEL KIT: Brand: GLIDESHEATH SLENDER

## (undated) DEVICE — LIGHT HANDLE: Brand: DEVON

## (undated) DEVICE — NEEDLE SPNL 22GA L3.5IN BLK HUB S STL REG WALL FIT STYL W/

## (undated) DEVICE — JP 3-SPRING RES W/15FR PVC DRAIN/TR: Brand: CARDINAL HEALTH

## (undated) DEVICE — CATHETER ANGIO 5FR L100CM GRY S STL NYL JR4 3 SEG BRAID L

## (undated) DEVICE — NEEDLE HYPO 21GA L1.5IN INTRAMUSCULAR S STL LATCH BVL UP

## (undated) DEVICE — SUTURE FIBERWIRE SZ 2 W/ TAPERED NEEDLE BLUE L38IN NONABSORB BLU L26.5MM 1/2 CIRCLE AR7200

## (undated) DEVICE — SOLUTION IV 100ML 0.9% SOD CHL DIL INJ

## (undated) DEVICE — SKIN MARKER,REGULAR TIP WITH RULER AND LABELS: Brand: DEVON

## (undated) DEVICE — CATH BLLN ANGIO 2.50X20MM NC EUPHORIA RX

## (undated) DEVICE — SOLUTION IV 1000ML LAC RINGERS PH 6.5 INJ USP VIAFLX PLAS

## (undated) DEVICE — SHEET,DRAPE,70X100,STERILE: Brand: MEDLINE

## (undated) DEVICE — PREP SKN CHLRAPRP APPL 10.5ML --

## (undated) DEVICE — NEEDLE HYPO 25GA L1.5IN BLU POLYPR HUB S STL REG BVL STR

## (undated) DEVICE — BAND COMPR L24CM REG CLR PLAS HEMSTAT EXT HK AND LOOP RETEN

## (undated) DEVICE — REM POLYHESIVE ADULT PATIENT RETURN ELECTRODE: Brand: VALLEYLAB

## (undated) DEVICE — SUTURE PDS II SZ 0 L27IN ABSRB VLT L36MM CT-1 1/2 CIR Z340H

## (undated) DEVICE — DECANTER BAG 9": Brand: MEDLINE INDUSTRIES, INC.

## (undated) DEVICE — BLADE RMFG SHVR 4.0MM TOMCAT --

## (undated) DEVICE — CARTRIDGE CLP LIG HEMLOK GRN --

## (undated) DEVICE — SLIM BODY SKIN STAPLER: Brand: APPOSE ULC

## (undated) DEVICE — ELECTRO LUBE IS A SINGLE PATIENT USE DEVICE THAT IS INTENDED TO BE USED ON ELECTROSURGICAL ELECTRODES TO REDUCE STICKING.: Brand: KEY SURGICAL ELECTRO LUBE

## (undated) DEVICE — SUTURE VCRL SZ 0 L36IN ABSRB UD L36MM CT-1 1/2 CIR J946H

## (undated) DEVICE — 4-PORT MANIFOLD: Brand: NEPTUNE 2

## (undated) DEVICE — SUT PDS II 0 18IN CT1 VIO --

## (undated) DEVICE — BLADE SHV 4.0MM TOMCAT --

## (undated) DEVICE — 3M™ COBAN™ SELF-ADHERENT WRAP, 1586S, STERILE, 6 IN X 5 YD (15 CM X 4,5 M), 12 ROLLS/CASE: Brand: 3M™ COBAN™

## (undated) DEVICE — DRSG PATCH ANTIMIC 1INX4.0MM -- CONVERT TO ITEM 356053

## (undated) DEVICE — PADS  DEFIB  ADULT

## (undated) DEVICE — Device: Brand: JELCO

## (undated) DEVICE — CLIP LIG ABSRB HEM-LOK LG PUR --

## (undated) DEVICE — INTENDED FOR TISSUE SEPARATION, AND OTHER PROCEDURES THAT REQUIRE A SHARP SURGICAL BLADE TO PUNCTURE OR CUT.: Brand: BARD-PARKER ®  SAFETY SCALPED

## (undated) DEVICE — Z DISCONTINUED USE 2639304 STRAP POS W3INXL30FT WIDE BK TO BK HK AND LOOP CLSR ALISTRP

## (undated) DEVICE — TIP COVER ACCESSORY

## (undated) DEVICE — SUTURE MCRYL SZ 4-0 L18IN ABSRB UD L19MM PS-2 3/8 CIR PRIM Y496G

## (undated) DEVICE — SOLUTION IV STRL H2O 500 ML AQUALITE POUR BTL

## (undated) DEVICE — CATH BLLN ANGIO 2X12MM SC EUPHORA RX

## (undated) DEVICE — Z DISCONTINUED USE 2150869 IMMOBILIZER SHLDR L BASIC SUP ABD SLNG

## (undated) DEVICE — ELECTRODE PT RET AD L9FT HI MOIST COND ADH HYDRGEL CORDED

## (undated) DEVICE — [ARTHROSCOPY PUMP,  DO NOT USE IF PACKAGE IS DAMAGED,  KEEP DRY,  KEEP AWAY FROM SUNLIGHT,  PROTECT FROM HEAT AND RADIOACTIVE SOURCES.]: Brand: FLOSTEADY

## (undated) DEVICE — (D)CAP REDUC 1 SEAL ENDOPTH -- DISC W/NO SUB

## (undated) DEVICE — PRESSURE MONITORING SET: Brand: TRUWAVE

## (undated) DEVICE — 3M™ MEDIPORE™ H SOFT CLOTH SURGICAL TAPE, 2863, 3 IN X 10 YD, 12/CASE: Brand: 3M™ MEDIPORE™

## (undated) DEVICE — SUTURE MCRYL + SZ 2-0 L36IN ABSRB UD CT-1 L36MM 1/2 CIR MCP945H

## (undated) DEVICE — CATHETER URETH 20FR 5CC BLLN SIL ELASTMR F 2 W

## (undated) DEVICE — 2, DISPOSABLE SUCTION/IRRIGATOR WITH DISPOSABLE TIP: Brand: STRYKEFLOW

## (undated) DEVICE — 90-S MAX, SUCTION PROBE, NON-BENDABLE, MAX CUT LEVEL 11: Brand: SERFAS ENERGY

## (undated) DEVICE — BOWL AND CEMENT CARTRIDGE WITH BREAKAWAY FEMORAL NOZZLE: Brand: ACM

## (undated) DEVICE — SUTURE VCRL + SZ 2-0 L36IN ABSRB UD L36MM CT-1 1/2 CIR VCP945H

## (undated) DEVICE — DRSG GZ OIL EMUL CURAD 3X3 --

## (undated) DEVICE — SUTURE ABSRB L6IN L37MM 0 GS-21 GRN 1/2 CIR TAPR PNT NDL VLOCL0306

## (undated) DEVICE — SUPPORT WRST COMPR W/ HK MBRACE

## (undated) DEVICE — CATHETER F BLLN 5CC 18FR 2 W HYDRGEL COAT LESS TRAUM LUB

## (undated) DEVICE — SUTURE VCRL + SZ 1 L36IN ABSRB UD L36MM CT-1 1/2 CIR VCP947H

## (undated) DEVICE — 3M™ WARMING BLANKET, LOWER BODY, 10 PER CASE, 42568: Brand: BAIR HUGGER™

## (undated) DEVICE — BLADE ASSEMB CLP HAIR FINE --

## (undated) DEVICE — T5 HOOD WITH PEEL AWAY FACE SHIELD

## (undated) DEVICE — BALANCE MIDDLEWEIGHT UNIVERSAL GUIDE WIRE .014 J TIP  W/O MARKERS 3.0 CM X 190 CM: Brand: HI-TORQUE BALANCE MIDDLEWEIGHT UNIVERSAL

## (undated) DEVICE — INFLOW CASSETTE TUBING, DO NOT USE IF PACKAGE IS DAMAGED: Brand: CROSSFLOW

## (undated) DEVICE — PREP SKN DURAPREP 26ML APPL --

## (undated) DEVICE — SUTURE VCRL SZ 2 L27IN ABSRB VLT L65MM TP-1 1/2 CIR J649G

## (undated) DEVICE — NEEDLE HYPO 18GA L1.5IN PNK S STL HUB POLYPR SHLD REG BVL

## (undated) DEVICE — DERMABOND SKIN ADH 0.7ML -- DERMABOND ADVANCED 12/BX

## (undated) DEVICE — NEEDLE SUT PASS FOR ROT CUF LABRAL REP MULTFI SCORPION

## (undated) DEVICE — BLANKET WRM W40.2XL55.9IN IORT LO BODY + MISTRAL AIR

## (undated) DEVICE — LAPAROSCOPIC SCISSORS: Brand: EPIX LAPAROSCOPIC SCISSORS

## (undated) DEVICE — CANNULA THREADED FLEX 8.0 X 72MM: Brand: CLEAR-TRAC

## (undated) DEVICE — 3M™ BAIR PAWS FLEX™ WARMING GOWN, STANDARD, 20 PER CASE 81003: Brand: BAIR PAWS™

## (undated) DEVICE — ICE BG EGL STYL 9 IN BLU 12 CS

## (undated) DEVICE — COVER LT HNDL BLU STRL -- MEDICHOICE

## (undated) DEVICE — SUTURE STRATAFIX SYMMETRIC PDS + 1 SGL ARMED CT 18 IN LEN SXPP1A405

## (undated) DEVICE — PIN GUIDE FIX 3.2X62 MM SCREW GS9030620324P] KOMET MEDICAL]

## (undated) DEVICE — INTENDED FOR TISSUE SEPARATION, AND OTHER PROCEDURES THAT REQUIRE A SHARP SURGICAL BLADE TO PUNCTURE OR CUT.: Brand: BARD-PARKER ® STAINLESS STEEL BLADES

## (undated) DEVICE — APPLIER CLP M/L SHFT DIA5MM 15 LIG LIGAMAX 5

## (undated) DEVICE — SUTURE ETHLN SZ 2-0 L18IN NONABSORBABLE BLK L19MM PS-2 PRIM 593H

## (undated) DEVICE — CLIP INT XL YEL POLYMER HEM-O-LOK WECK

## (undated) DEVICE — GAUZE SPONGES,16 PLY: Brand: CURITY

## (undated) DEVICE — BLADE SAW W11XL77.5MM THK1.23MM CUT THK1.17MM S STL RECIP

## (undated) DEVICE — TELFA NON-ADHERENT ABSORBENT DRESSING: Brand: TELFA

## (undated) DEVICE — BLADELESS OPTICAL TROCAR WITH FIXATION CANNULA: Brand: VERSAPORT

## (undated) DEVICE — SYR 10ML CTRL LR LCK NSAF LF --

## (undated) DEVICE — GEL BAG FOR WRAPS --

## (undated) DEVICE — SUTURE V-LOC 180 SZ 3-0 L6IN ABSRB VLT CV-23 L17MM 1/2 CIR VLOCM0804

## (undated) DEVICE — SOLUTION IRRIG 500ML 0.9% SOD CHLO USP POUR PLAS BTL

## (undated) DEVICE — SUTURE VCRL SZ 0 L18IN ABSRB UD POLYGLACTIN 910 BRAID TIE J912G

## (undated) DEVICE — SET FLD ADMIN 3 W STPCOCK FIX FEM L BOR 1IN